# Patient Record
Sex: MALE | Race: WHITE | NOT HISPANIC OR LATINO | Employment: UNEMPLOYED | URBAN - METROPOLITAN AREA
[De-identification: names, ages, dates, MRNs, and addresses within clinical notes are randomized per-mention and may not be internally consistent; named-entity substitution may affect disease eponyms.]

---

## 2017-03-13 ENCOUNTER — ANESTHESIA EVENT (OUTPATIENT)
Dept: PERIOP | Facility: HOSPITAL | Age: 62
End: 2017-03-13
Payer: COMMERCIAL

## 2017-03-14 ENCOUNTER — HOSPITAL ENCOUNTER (OUTPATIENT)
Facility: HOSPITAL | Age: 62
Setting detail: OUTPATIENT SURGERY
Discharge: HOME | End: 2017-03-14
Attending: PODIATRIST | Admitting: PODIATRIST
Payer: COMMERCIAL

## 2017-03-14 ENCOUNTER — ANESTHESIA (OUTPATIENT)
Dept: PERIOP | Facility: HOSPITAL | Age: 62
End: 2017-03-14
Payer: COMMERCIAL

## 2017-03-14 ENCOUNTER — APPOINTMENT (OUTPATIENT)
Dept: RADIOLOGY | Facility: HOSPITAL | Age: 62
End: 2017-03-14
Payer: COMMERCIAL

## 2017-03-14 VITALS
HEIGHT: 76 IN | OXYGEN SATURATION: 98 % | DIASTOLIC BLOOD PRESSURE: 81 MMHG | HEART RATE: 69 BPM | TEMPERATURE: 98.1 F | WEIGHT: 304 LBS | SYSTOLIC BLOOD PRESSURE: 123 MMHG | BODY MASS INDEX: 37.02 KG/M2 | RESPIRATION RATE: 18 BRPM

## 2017-03-14 LAB — GLUCOSE SERPL-MCNC: 118 MG/DL (ref 65–140)

## 2017-03-14 PROCEDURE — 82948 REAGENT STRIP/BLOOD GLUCOSE: CPT

## 2017-03-14 PROCEDURE — 73630 X-RAY EXAM OF FOOT: CPT

## 2017-03-14 RX ORDER — PROPOFOL 10 MG/ML
INJECTION, EMULSION INTRAVENOUS CONTINUOUS PRN
Status: DISCONTINUED | OUTPATIENT
Start: 2017-03-14 | End: 2017-03-14 | Stop reason: SURG

## 2017-03-14 RX ORDER — SODIUM CHLORIDE, SODIUM LACTATE, POTASSIUM CHLORIDE, CALCIUM CHLORIDE 600; 310; 30; 20 MG/100ML; MG/100ML; MG/100ML; MG/100ML
100 INJECTION, SOLUTION INTRAVENOUS CONTINUOUS
Status: DISCONTINUED | OUTPATIENT
Start: 2017-03-14 | End: 2017-03-14 | Stop reason: HOSPADM

## 2017-03-14 RX ORDER — ONDANSETRON 2 MG/ML
4 INJECTION INTRAMUSCULAR; INTRAVENOUS ONCE
Status: DISCONTINUED | OUTPATIENT
Start: 2017-03-14 | End: 2017-03-14 | Stop reason: HOSPADM

## 2017-03-14 RX ORDER — SODIUM CHLORIDE, SODIUM LACTATE, POTASSIUM CHLORIDE, CALCIUM CHLORIDE 600; 310; 30; 20 MG/100ML; MG/100ML; MG/100ML; MG/100ML
INJECTION, SOLUTION INTRAVENOUS CONTINUOUS PRN
Status: DISCONTINUED | OUTPATIENT
Start: 2017-03-14 | End: 2017-03-14 | Stop reason: SURG

## 2017-03-14 RX ORDER — FENTANYL CITRATE/PF 50 MCG/ML
25 SYRINGE (ML) INJECTION
Status: DISCONTINUED | OUTPATIENT
Start: 2017-03-14 | End: 2017-03-14 | Stop reason: HOSPADM

## 2017-03-14 RX ORDER — FENTANYL CITRATE 50 UG/ML
INJECTION, SOLUTION INTRAMUSCULAR; INTRAVENOUS AS NEEDED
Status: DISCONTINUED | OUTPATIENT
Start: 2017-03-14 | End: 2017-03-14 | Stop reason: SURG

## 2017-03-14 RX ORDER — MIDAZOLAM HYDROCHLORIDE 1 MG/ML
INJECTION INTRAMUSCULAR; INTRAVENOUS AS NEEDED
Status: DISCONTINUED | OUTPATIENT
Start: 2017-03-14 | End: 2017-03-14 | Stop reason: SURG

## 2017-03-14 RX ADMIN — PROPOFOL 100 MCG/KG/MIN: 10 INJECTION, EMULSION INTRAVENOUS at 10:15

## 2017-03-14 RX ADMIN — FENTANYL CITRATE 50 MCG: 50 INJECTION, SOLUTION INTRAMUSCULAR; INTRAVENOUS at 10:13

## 2017-03-14 RX ADMIN — CEFAZOLIN SODIUM 1000 MG: 1 SOLUTION INTRAVENOUS at 10:10

## 2017-03-14 RX ADMIN — SODIUM CHLORIDE, SODIUM LACTATE, POTASSIUM CHLORIDE, AND CALCIUM CHLORIDE: .6; .31; .03; .02 INJECTION, SOLUTION INTRAVENOUS at 09:42

## 2017-03-14 RX ADMIN — CEFAZOLIN SODIUM 2000 MG: 2 SOLUTION INTRAVENOUS at 10:10

## 2017-03-14 RX ADMIN — MIDAZOLAM HYDROCHLORIDE 2 MG: 1 INJECTION, SOLUTION INTRAMUSCULAR; INTRAVENOUS at 10:10

## 2018-01-10 NOTE — PROGRESS NOTES
Assessment    1  Seborrheic keratosis (702 19) (L82 1)   2  Screening for skin condition (V82 0) (Z13 89)    Plan    · Follow-up visit in 1 year Evaluation and Treatment  Follow-up  Status: Hold For -  Scheduling  Requested for: [id number]    · Glimepiride 1 MG Oral Tablet    Discussion/Summary  Discussion Summary:   Seborrheic keratosis patient advised these are normal growths we acquire with age no treatment needed no active psoriasiform process noted we'll plan follow-up again as needed continue support stockings and podiatry follow-up nothing else of concern noted on complete exam       Chief Complaint  Chief Complaint Free Text Note Form: YEARLY FULL BODY EXAM      History of Present Illness  HPI: 52-year-old male presents for follow-up secondary to his history of psoriasiform dermatitis no problems noted however continues to have issues with his leg with ulcerations and blistering occurring probably related to neuropathy followed by podiatry no specific concerns for his skin noted      Review of Systems  Complete Male Dermatology [facility] Patient:   Constitutional: Denies constitutional symptoms  Eyes: Denies eye symptoms  ENT:  denies ear symptoms, nasal symptoms, mouth or throat symptoms  Cardiovascular: Denies cardiovascular symptoms  Respiratory: Denies respiratory symptoms  Gastrointestinal: Denies gastrointestinal symptoms  Musculoskeletal: Denies musculoskeletal symptoms  Integumentary: Denies skin, hair and nail symptoms  Neurological: Denies neurologic symptoms  Psychiatric: Denies psychiatric symptoms  Endocrine: Denies endocrine symptoms  Hematologic/Lymphatic: Denies hematologic symptoms  Active Problems    1  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)   2  Psoriasis (696 1) (L40 9)   3  Screening for skin condition (V82 0) (Z13 89)   4  Ulcer of leg, chronic, right (707 10) (L97 919)    Past Medical History    1  History of diabetes mellitus (V12 29) (Z86 39)   2  History of hypercholesterolemia (V12 29) (Z86 39)   3  History of hypertension (V12 59) (Z86 79)  Past Medical History Reviewed- Derm:   The past medical history was reviewed  Surgical History  Surgical History Reviewed [facility]- Derm:   Surgical History reviewed      Family History  Mother    1  No pertinent family history  Family History Reviewed- Derm:   Family History was reviewed      Social History    · Never a smoker  Social History Reviewed [facility]- Derm: The social history was reviewed      Current Meds   1  Allopurinol 100 MG Oral Tablet; Therapy: 08XKC8189 to (Evaluate:30Apr2015) Recorded   2  Aspirin 81 MG TABS; Therapy: (Recorded:31Mar2015) to Recorded   3  Clobetasol Propionate 0 05 % External Ointment; APPLY SPARINGLY TO AFFECTED   AREA(S) TWICE DAILY; Therapy: 26TMZ8281 to (Last Rx:17Aug2015)  Requested for: 17Aug2015 Ordered   4  Glimepiride 1 MG Oral Tablet Recorded   5  MetFORMIN HCl  MG Oral Tablet Extended Release 24 Hour; Therapy: 66JZV8160 to (Evaluate:30Apr2015) Recorded   6  Simvastatin 40 MG Oral Tablet; Therapy: 34IIF7278 to (Evaluate:30Apr2015) Recorded   7  Triamcinolone Acetonide 0 1 % External Ointment; APPLY AND GENTLY MASSAGE INTO   AFFECTED AREA(S) TWICE DAILY; Therapy: 73QAY1840 to (Last QZ:66HBO1678)  Requested for: 03Jun2015 Ordered   8  Valsartan-Hydrochlorothiazide 320-12 5 MG Oral Tablet; Therapy: 13FEP2214 to (Evaluate:30Apr2015) Recorded  Medication List Reviewed: The medication list was reviewed and updated today  Allergies    1  No Known Drug Allergies    Physical Exam    Constitutional   General appearance: Appears healthy and well developed  Lymphatic   No visible disturbance  Musculoskeletal   Digits and nails: No clubbing, cyanosis or edema  Cutaneous and nail exam normal     Skin   Scalp skin texture and hair distribution: Normal skin texture on scalp, normal hair distribution  Head: Normal turgor, no rashes, no lesions  Neck: Normal turgor, no rashes, no lesions  Chest: Normal turgor, no rashes, no lesions  Abdomen: Normal turgor, no rashes, no lesions  Back: Normal turgor, no rashes, no lesions  Right upper extremity: Normal turgor, no rashes, no lesions  Left upper extremity: Normal turgor, no rashes, no lesions  Right lower extremity: Normal turgor, no rashes, no lesions  Left lower extremity: Normal turgor, no rashes, no lesions  Neuro/Psych   Alert and oriented x 3  Displays comfort and cooperation during encounterl   Affect is normal     Finding Chronic stasis changes noted on the right leg normal keratotic papules with greasy stuck on appearance small erosion noted on the right second toe nothing else atypical noted on exam       Signatures   Electronically signed by : WESLEY Montejo ; Aug 22 2016 10:31AM EST                       (Author)

## 2019-02-18 ENCOUNTER — ANESTHESIA EVENT (OUTPATIENT)
Dept: PERIOP | Facility: HOSPITAL | Age: 64
End: 2019-02-18
Payer: COMMERCIAL

## 2019-02-19 ENCOUNTER — HOSPITAL ENCOUNTER (OUTPATIENT)
Facility: HOSPITAL | Age: 64
Setting detail: OUTPATIENT SURGERY
Discharge: HOME | End: 2019-02-19
Attending: PODIATRIST | Admitting: PODIATRIST
Payer: COMMERCIAL

## 2019-02-19 ENCOUNTER — APPOINTMENT (OUTPATIENT)
Dept: RADIOLOGY | Facility: HOSPITAL | Age: 64
End: 2019-02-19
Attending: PODIATRIST
Payer: COMMERCIAL

## 2019-02-19 ENCOUNTER — ANESTHESIA (OUTPATIENT)
Dept: PERIOP | Facility: HOSPITAL | Age: 64
End: 2019-02-19
Payer: COMMERCIAL

## 2019-02-19 VITALS
BODY MASS INDEX: 37.02 KG/M2 | WEIGHT: 304 LBS | TEMPERATURE: 98.8 F | DIASTOLIC BLOOD PRESSURE: 91 MMHG | OXYGEN SATURATION: 94 % | HEART RATE: 75 BPM | RESPIRATION RATE: 16 BRPM | HEIGHT: 76 IN | SYSTOLIC BLOOD PRESSURE: 166 MMHG

## 2019-02-19 DIAGNOSIS — M20.41 HAMMER TOE OF RIGHT FOOT: Primary | ICD-10-CM

## 2019-02-19 LAB
GLUCOSE SERPL-MCNC: 129 MG/DL (ref 65–140)
GLUCOSE SERPL-MCNC: 132 MG/DL (ref 65–140)

## 2019-02-19 PROCEDURE — 82948 REAGENT STRIP/BLOOD GLUCOSE: CPT

## 2019-02-19 PROCEDURE — 73630 X-RAY EXAM OF FOOT: CPT

## 2019-02-19 RX ORDER — MEPERIDINE HYDROCHLORIDE 25 MG/ML
12.5 INJECTION INTRAMUSCULAR; INTRAVENOUS; SUBCUTANEOUS ONCE AS NEEDED
Status: DISCONTINUED | OUTPATIENT
Start: 2019-02-19 | End: 2019-02-19 | Stop reason: HOSPADM

## 2019-02-19 RX ORDER — PROPOFOL 10 MG/ML
INJECTION, EMULSION INTRAVENOUS CONTINUOUS PRN
Status: DISCONTINUED | OUTPATIENT
Start: 2019-02-19 | End: 2019-02-19 | Stop reason: SURG

## 2019-02-19 RX ORDER — PROPOFOL 10 MG/ML
INJECTION, EMULSION INTRAVENOUS AS NEEDED
Status: DISCONTINUED | OUTPATIENT
Start: 2019-02-19 | End: 2019-02-19 | Stop reason: SURG

## 2019-02-19 RX ORDER — PROMETHAZINE HYDROCHLORIDE 25 MG/ML
25 INJECTION, SOLUTION INTRAMUSCULAR; INTRAVENOUS ONCE AS NEEDED
Status: DISCONTINUED | OUTPATIENT
Start: 2019-02-19 | End: 2019-02-19 | Stop reason: HOSPADM

## 2019-02-19 RX ORDER — LIDOCAINE HYDROCHLORIDE 10 MG/ML
INJECTION, SOLUTION INFILTRATION; PERINEURAL AS NEEDED
Status: DISCONTINUED | OUTPATIENT
Start: 2019-02-19 | End: 2019-02-19 | Stop reason: SURG

## 2019-02-19 RX ORDER — FENTANYL CITRATE/PF 50 MCG/ML
25 SYRINGE (ML) INJECTION
Status: DISCONTINUED | OUTPATIENT
Start: 2019-02-19 | End: 2019-02-19 | Stop reason: HOSPADM

## 2019-02-19 RX ORDER — BUPIVACAINE HYDROCHLORIDE 5 MG/ML
INJECTION, SOLUTION PERINEURAL AS NEEDED
Status: DISCONTINUED | OUTPATIENT
Start: 2019-02-19 | End: 2019-02-19 | Stop reason: HOSPADM

## 2019-02-19 RX ORDER — FENTANYL CITRATE 50 UG/ML
INJECTION, SOLUTION INTRAMUSCULAR; INTRAVENOUS AS NEEDED
Status: DISCONTINUED | OUTPATIENT
Start: 2019-02-19 | End: 2019-02-19 | Stop reason: SURG

## 2019-02-19 RX ORDER — MIDAZOLAM HYDROCHLORIDE 1 MG/ML
INJECTION INTRAMUSCULAR; INTRAVENOUS AS NEEDED
Status: DISCONTINUED | OUTPATIENT
Start: 2019-02-19 | End: 2019-02-19 | Stop reason: SURG

## 2019-02-19 RX ORDER — SODIUM CHLORIDE, SODIUM LACTATE, POTASSIUM CHLORIDE, CALCIUM CHLORIDE 600; 310; 30; 20 MG/100ML; MG/100ML; MG/100ML; MG/100ML
INJECTION, SOLUTION INTRAVENOUS CONTINUOUS PRN
Status: DISCONTINUED | OUTPATIENT
Start: 2019-02-19 | End: 2019-02-19 | Stop reason: SURG

## 2019-02-19 RX ORDER — DIPHENHYDRAMINE HYDROCHLORIDE 50 MG/ML
12.5 INJECTION INTRAMUSCULAR; INTRAVENOUS ONCE AS NEEDED
Status: DISCONTINUED | OUTPATIENT
Start: 2019-02-19 | End: 2019-02-19 | Stop reason: HOSPADM

## 2019-02-19 RX ORDER — HYDRALAZINE HYDROCHLORIDE 20 MG/ML
5 INJECTION INTRAMUSCULAR; INTRAVENOUS ONCE
Status: COMPLETED | OUTPATIENT
Start: 2019-02-19 | End: 2019-02-19

## 2019-02-19 RX ORDER — LIDOCAINE HYDROCHLORIDE 10 MG/ML
INJECTION, SOLUTION EPIDURAL; INFILTRATION; INTRACAUDAL; PERINEURAL AS NEEDED
Status: DISCONTINUED | OUTPATIENT
Start: 2019-02-19 | End: 2019-02-19 | Stop reason: HOSPADM

## 2019-02-19 RX ORDER — METOCLOPRAMIDE HYDROCHLORIDE 5 MG/ML
10 INJECTION INTRAMUSCULAR; INTRAVENOUS ONCE AS NEEDED
Status: DISCONTINUED | OUTPATIENT
Start: 2019-02-19 | End: 2019-02-19 | Stop reason: HOSPADM

## 2019-02-19 RX ADMIN — LIDOCAINE HYDROCHLORIDE 50 MG: 10 INJECTION, SOLUTION INFILTRATION; PERINEURAL at 11:20

## 2019-02-19 RX ADMIN — SODIUM CHLORIDE, SODIUM LACTATE, POTASSIUM CHLORIDE, AND CALCIUM CHLORIDE: .6; .31; .03; .02 INJECTION, SOLUTION INTRAVENOUS at 10:31

## 2019-02-19 RX ADMIN — FENTANYL CITRATE 50 MCG: 50 INJECTION, SOLUTION INTRAMUSCULAR; INTRAVENOUS at 11:20

## 2019-02-19 RX ADMIN — PROPOFOL 100 MCG/KG/MIN: 10 INJECTION, EMULSION INTRAVENOUS at 11:20

## 2019-02-19 RX ADMIN — MIDAZOLAM 2 MG: 1 INJECTION INTRAMUSCULAR; INTRAVENOUS at 11:14

## 2019-02-19 RX ADMIN — PROPOFOL 30 MG: 10 INJECTION, EMULSION INTRAVENOUS at 11:20

## 2019-02-19 RX ADMIN — HYDRALAZINE HYDROCHLORIDE 5 MG: 20 INJECTION INTRAMUSCULAR; INTRAVENOUS at 12:56

## 2019-02-19 NOTE — ANESTHESIA POSTPROCEDURE EVALUATION
Post-Op Assessment Note    CV Status:  Stable    Pain management: adequate     Mental Status:  Alert and awake   Hydration Status:  Euvolemic   PONV Controlled:  Controlled   Airway Patency:  Patent   Post Op Vitals Reviewed: Yes      Staff: CRNA           /80 (02/19/19 1151)    Temp 97 9 °F (36 6 °C) (02/19/19 1151)    Pulse 73 (02/19/19 1151)   Resp 20 (02/19/19 1151)    SpO2 97 % (02/19/19 1151)

## 2019-02-19 NOTE — OP NOTE
OPERATIVE REPORT  PATIENT NAME: Tonda Mcburney    :  1955  MRN: [id number]  Pt Location: BE OR ROOM 05    SURGERY DATE: 2019    Surgeon(s) and Role:     Lauryn Conway DPM - Primary    Preop Diagnosis:  Other hammer toe(s) (acquired), right foot [M20 41]    Post-Op Diagnosis Codes:     * Other hammer toe(s) (acquired), right foot [M20 41]    Procedure(s) (LRB):  THIRD HAMMER TOE CORRECTION (Right)    Specimen(s):  * No specimens in log *    Estimated Blood Loss:   Minimal    Drains:  * No LDAs found *    Anesthesia Type:   IV Sedation with Anesthesia    Operative Indications: Other hammer toe(s) (acquired), right foot [M20 41]      Operative Findings:  Hammertoe third digit right foot    Complications:   None    Procedure and Technique:  The patient was brought into the OR placed on a well-padded OR table in supine fashion  Following prep and drape of the right lower extremity in the usual sterile manner  the procedure began  Procedure one:  PIPJ arthroplasty third digit right foot    Attention was directed at the dorsum of the third digit right foot where an incision measuring approximately 3 centimeters in length was made  The incision was deepened to the level extensor tendon taking care to retract neurovascular bundles a bovieing small less structure necessary  A linear incision was made at the medial lateral aspects of the incision such that the collateral ligaments of the joint will be transected  A Rindge elevator was then used to reflect the extensor tendon laterally and the head of the proximal phalanx was delivered into the surgical site  A bone cutting forceps was then used at the neck of the proximal phalanx to transect and remove the head of the proximal phalanx  All structures were then returned to anatomic position  The skin was closed with four 0 nylon in simple ruptured fashion  A dressing of bacitracin, 4x4s, fluff, Julisa and then Coban tape was used      The patient tolerated procedure and anesthesia well  The patient was taken from the OR under the care of Anesthesia vital signs stable and vascular status at preoperative levels     I was present for the entire procedure    Patient Disposition:  PACU     SIGNATURE: Helio Martinez DPM  DATE: February 19, 2019  TIME: 11:51 AM

## 2019-02-19 NOTE — ANESTHESIA PREPROCEDURE EVALUATION
Review of Systems/Medical History  Patient summary reviewed        Cardiovascular  Hyperlipidemia, Hypertension controlled,    Pulmonary  Negative pulmonary ROS        GI/Hepatic  Negative GI/hepatic ROS          Negative  ROS        Endo/Other  Diabetes well controlled type 2 Oral agent,      GYN  Negative gynecology ROS          Hematology  Negative hematology ROS      Musculoskeletal  Negative musculoskeletal ROS        Neurology  Negative neurology ROS      Psychology   Negative psychology ROS              Physical Exam    Airway    Mallampati score: II  TM Distance: >3 FB  Neck ROM: full     Dental   No notable dental hx     Cardiovascular  Rhythm: regular, Rate: normal, Cardiovascular exam normal    Pulmonary  Pulmonary exam normal Breath sounds clear to auscultation,     Other Findings        Anesthesia Plan  ASA Score- 2     Anesthesia Type- IV sedation with anesthesia with ASA Monitors  Additional Monitors:   Airway Plan:         Plan Factors-    Induction- intravenous  Postoperative Plan- Plan for postoperative opioid use  Informed Consent- Anesthetic plan and risks discussed with patient  I personally reviewed this patient with the CRNA  Discussed and agreed on the Anesthesia Plan with the CRNA  Wendie Chavarria

## 2019-02-19 NOTE — DISCHARGE INSTRUCTIONS
Do not remove dressing  Do not get dressing wet  Elevate right foot on pillows above heart level  Ice right foot at 15 min  Intervals for the first 24 hours  Do not use heat  Weight bearing as tolerated with surgical shoe on while ambulating  Call doctor for increased pain, bleeding, fever above 101 4 orally, chills, or foul odor from your incision site

## 2019-02-19 NOTE — H&P
history and Physical reviewed   No changes   Blood sugar 118  OK to proceed with hammar toe procedure

## 2019-04-17 ENCOUNTER — OFFICE VISIT (OUTPATIENT)
Dept: DERMATOLOGY | Facility: CLINIC | Age: 64
End: 2019-04-17
Payer: COMMERCIAL

## 2019-04-17 DIAGNOSIS — Z13.89 SCREENING FOR SKIN CONDITION: ICD-10-CM

## 2019-04-17 DIAGNOSIS — I87.2 STASIS DERMATITIS OF BOTH LEGS: Primary | ICD-10-CM

## 2019-04-17 DIAGNOSIS — D22.9 NEVUS: ICD-10-CM

## 2019-04-17 PROCEDURE — 99214 OFFICE O/P EST MOD 30 MIN: CPT | Performed by: DERMATOLOGY

## 2019-04-17 RX ORDER — AMOXICILLIN AND CLAVULANATE POTASSIUM 500; 125 MG/1; MG/1
1 TABLET, FILM COATED ORAL 2 TIMES DAILY
Refills: 0 | COMMUNITY
Start: 2019-02-11 | End: 2020-11-20

## 2019-04-17 RX ORDER — CLINDAMYCIN HYDROCHLORIDE 150 MG/1
150 CAPSULE ORAL 3 TIMES DAILY
Refills: 0 | COMMUNITY
Start: 2019-04-08 | End: 2020-11-20

## 2019-05-15 ENCOUNTER — OFFICE VISIT (OUTPATIENT)
Dept: DERMATOLOGY | Facility: CLINIC | Age: 64
End: 2019-05-15
Payer: COMMERCIAL

## 2019-05-15 DIAGNOSIS — I87.2 STASIS DERMATITIS OF BOTH LEGS: Primary | ICD-10-CM

## 2019-05-15 PROCEDURE — 99213 OFFICE O/P EST LOW 20 MIN: CPT | Performed by: DERMATOLOGY

## 2019-06-17 ENCOUNTER — OFFICE VISIT (OUTPATIENT)
Dept: DERMATOLOGY | Facility: CLINIC | Age: 64
End: 2019-06-17
Payer: COMMERCIAL

## 2019-06-17 DIAGNOSIS — I87.2 STASIS DERMATITIS OF BOTH LEGS: Primary | ICD-10-CM

## 2019-06-17 PROCEDURE — 99213 OFFICE O/P EST LOW 20 MIN: CPT | Performed by: DERMATOLOGY

## 2019-07-24 DIAGNOSIS — I87.2 STASIS DERMATITIS OF BOTH LEGS: ICD-10-CM

## 2019-10-28 ENCOUNTER — OFFICE VISIT (OUTPATIENT)
Dept: DERMATOLOGY | Facility: CLINIC | Age: 64
End: 2019-10-28
Payer: COMMERCIAL

## 2019-10-28 DIAGNOSIS — I87.2 STASIS DERMATITIS OF BOTH LEGS: Primary | ICD-10-CM

## 2019-10-28 DIAGNOSIS — I83.029 STASIS LEG ULCER, LEFT (HCC): ICD-10-CM

## 2019-10-28 DIAGNOSIS — L97.929 STASIS LEG ULCER, LEFT (HCC): ICD-10-CM

## 2019-10-28 PROCEDURE — 87205 SMEAR GRAM STAIN: CPT | Performed by: DERMATOLOGY

## 2019-10-28 PROCEDURE — 87186 SC STD MICRODIL/AGAR DIL: CPT | Performed by: DERMATOLOGY

## 2019-10-28 PROCEDURE — 99213 OFFICE O/P EST LOW 20 MIN: CPT | Performed by: DERMATOLOGY

## 2019-10-28 PROCEDURE — 87070 CULTURE OTHR SPECIMN AEROBIC: CPT | Performed by: DERMATOLOGY

## 2019-10-28 PROCEDURE — 29580 STRAPPING UNNA BOOT: CPT | Performed by: DERMATOLOGY

## 2019-10-31 DIAGNOSIS — L73.9 FOLLICULITIS: ICD-10-CM

## 2019-10-31 DIAGNOSIS — Z22.322 MRSA (METHICILLIN RESISTANT STAPH AUREUS) CULTURE POSITIVE: ICD-10-CM

## 2019-10-31 DIAGNOSIS — I83.029 STASIS LEG ULCER, LEFT (HCC): Primary | ICD-10-CM

## 2019-10-31 DIAGNOSIS — L97.929 STASIS LEG ULCER, LEFT (HCC): Primary | ICD-10-CM

## 2019-10-31 LAB
BACTERIA WND AEROBE CULT: ABNORMAL
GRAM STN SPEC: ABNORMAL

## 2019-10-31 RX ORDER — DOXYCYCLINE HYCLATE 100 MG/1
100 CAPSULE ORAL EVERY 12 HOURS SCHEDULED
Qty: 20 CAPSULE | Refills: 0 | Status: SHIPPED | OUTPATIENT
Start: 2019-10-31 | End: 2019-11-10

## 2019-11-04 ENCOUNTER — OFFICE VISIT (OUTPATIENT)
Dept: DERMATOLOGY | Facility: CLINIC | Age: 64
End: 2019-11-04
Payer: COMMERCIAL

## 2019-11-04 DIAGNOSIS — I87.2 STASIS DERMATITIS OF BOTH LEGS: ICD-10-CM

## 2019-11-04 DIAGNOSIS — Z22.322 MRSA (METHICILLIN RESISTANT STAPH AUREUS) CULTURE POSITIVE: ICD-10-CM

## 2019-11-04 DIAGNOSIS — I83.029 STASIS LEG ULCER, LEFT (HCC): Primary | ICD-10-CM

## 2019-11-04 DIAGNOSIS — L97.929 STASIS LEG ULCER, LEFT (HCC): Primary | ICD-10-CM

## 2019-11-04 PROCEDURE — 29580 STRAPPING UNNA BOOT: CPT | Performed by: DERMATOLOGY

## 2019-11-04 NOTE — PATIENT INSTRUCTIONS
Stasis ulcer was secondary MRSA improved again placed patient on no other Unna boot Coban dressing and advised patient to bring support stockings in the next visit so we can just basically switch him over to the support stockings if all looks well

## 2019-11-04 NOTE — PROGRESS NOTES
[address]  [facility]  [address] [address]  [phone]  [phone]     MRN: [id number] : 1955  Encounter: [id number]  Patient Information: Tara Cornell    Subjective:     52-year-old male seen in follow-up secondary to stasis dermatitis and with has superficial erosions with culture showing MRSA  Patient was started on doxycycline     Objective: There were no vitals taken for this visit  Physical Exam:    General Appearance:    Alert, cooperative, no distress   Skin:   Previous site of inflammation leg appears improved still red and scaly some small erosion still present but no signs of any active infection of pustules at this time     Assessment:     1  Stasis leg ulcer, left (HCC)     2  Stasis dermatitis of both legs     3  MRSA (methicillin resistant staph aureus) culture positive           Plan:   Stasis ulcer was secondary MRSA improved again placed patient on no other Unna boot Coban dressing and advised patient to bring support stockings in the next visit so we can just basically switch him over to the support stockings if all looks well      Prior to Admission medications    Medication Sig Start Date End Date Taking?  Authorizing Provider   allopurinol (ZYLOPRIM) 100 mg tablet Take 100 mg by mouth daily   Yes Historical Provider, MD   amoxicillin-clavulanate (AUGMENTIN) 500-125 mg per tablet Take 1 tablet by mouth 2 (two) times a day 19  Yes Historical Provider, MD   aspirin 81 MG tablet Take 81 mg by mouth every other day   Yes Historical Provider, MD   clindamycin (CLEOCIN) 150 mg capsule Take 150 mg by mouth 3 (three) times a day 19  Yes Historical Provider, MD   doxycycline hyclate (VIBRAMYCIN) 100 mg capsule Take 1 capsule (100 mg total) by mouth every 12 (twelve) hours for 20 doses 10/31/19 11/10/19 Yes Jessica Hernandez MD   metFORMIN (GLUCOPHAGE) 500 mg tablet Take 1,000 mg by mouth 2 (two) times a day with meals Yes Historical Provider, MD   simvastatin (ZOCOR) 40 mg tablet Take 40 mg by mouth daily at bedtime   Yes Historical Provider, MD   triamcinolone (KENALOG) 0 1 % ointment Apply topically 2 (two) times a day 2 left leg and other rashes till resolved 7/24/19  Yes Francoise Montes De Oca MD   valsartan-hydrochlorothiazide (DIOVAN-HCT) 320-12 5 MG per tablet Take 1 tablet by mouth daily   Yes Historical Provider, MD   Multiple Vitamins-Minerals (MULTIVITAMIN WITH MINERALS) tablet Take 1 tablet by mouth daily    Historical Provider, MD     No Known Allergies    Francoise Montes De Oca MD  11/4/2019,4:01 PM    Portions of the record may have been created with voice recognition software   Occasional wrong word or "sound a like" substitutions may have occurred due to the inherent limitations of voice recognition software   Read the chart carefully and recognize, using context, where substitutions have occurred

## 2019-11-12 ENCOUNTER — CLINICAL SUPPORT (OUTPATIENT)
Dept: DERMATOLOGY | Facility: CLINIC | Age: 64
End: 2019-11-12
Payer: COMMERCIAL

## 2019-11-12 DIAGNOSIS — I83.029 STASIS LEG ULCER, LEFT (HCC): Primary | ICD-10-CM

## 2019-11-12 DIAGNOSIS — L97.929 STASIS LEG ULCER, LEFT (HCC): Primary | ICD-10-CM

## 2019-11-12 DIAGNOSIS — I87.2 STASIS DERMATITIS OF BOTH LEGS: ICD-10-CM

## 2019-11-12 PROCEDURE — 99212 OFFICE O/P EST SF 10 MIN: CPT | Performed by: DERMATOLOGY

## 2019-11-12 RX ORDER — LOSARTAN POTASSIUM AND HYDROCHLOROTHIAZIDE 12.5; 1 MG/1; MG/1
TABLET ORAL DAILY
Refills: 0 | COMMUNITY
Start: 2019-09-13 | End: 2021-07-12 | Stop reason: HOSPADM

## 2019-11-12 RX ORDER — METFORMIN HYDROCHLORIDE 500 MG/1
TABLET, EXTENDED RELEASE ORAL
Refills: 0 | COMMUNITY
Start: 2019-08-22 | End: 2020-11-20

## 2019-11-12 RX ORDER — GLIMEPIRIDE 2 MG/1
TABLET ORAL
Refills: 0 | COMMUNITY
Start: 2019-10-28 | End: 2020-11-20

## 2019-11-12 NOTE — PROGRESS NOTES
[address] [facility] DERMATOLOGY  [facility] [address]  [phone]  [phone]     MRN: [id number] : 1955  Encounter: [id number]  Patient Information: Nia Trammell    Subjective:     52-year-old male presents for follow-up secondary to his stasis dermatitis and recent treatment with  Unna boots     Objective: There were no vitals taken for this visit  Physical Exam:    General Appearance:    Alert, cooperative, no distress   Skin: Dermatitis completely resolved     Assessment:     1  Stasis leg ulcer, left (HCC)     2  Stasis dermatitis of both legs           Plan:   Process improved patient to wear support stockings daily and plan follow-up again as needed      Prior to Admission medications    Medication Sig Start Date End Date Taking?  Authorizing Provider   allopurinol (ZYLOPRIM) 100 mg tablet Take 100 mg by mouth daily   Yes Historical Provider, MD   amoxicillin-clavulanate (AUGMENTIN) 500-125 mg per tablet Take 1 tablet by mouth 2 (two) times a day 19  Yes Historical Provider, MD   clindamycin (CLEOCIN) 150 mg capsule Take 150 mg by mouth 3 (three) times a day 19  Yes Historical Provider, MD   glimepiride (AMARYL) 2 mg tablet  10/28/19  Yes Historical Provider, MD   losartan-hydrochlorothiazide (HYZAAR) 100-12 5 MG per tablet  19  Yes Historical Provider, MD   metFORMIN (GLUCOPHAGE) 500 mg tablet Take 1,000 mg by mouth 2 (two) times a day with meals   Yes Historical Provider, MD   metFORMIN (GLUCOPHAGE-XR) 500 mg 24 hr tablet  19  Yes Historical Provider, MD   simvastatin (ZOCOR) 40 mg tablet Take 40 mg by mouth daily at bedtime   Yes Historical Provider, MD   triamcinolone (KENALOG) 0 1 % ointment Apply topically 2 (two) times a day 2 left leg and other rashes till resolved 19  Yes Manuel Kim MD   aspirin 81 MG tablet Take 81 mg by mouth every other day    Historical Provider, MD   Multiple Vitamins-Minerals (MULTIVITAMIN WITH MINERALS) tablet Take 1 tablet by mouth daily    Historical Provider, MD   valsartan-hydrochlorothiazide (DIOVAN-HCT) 320-12 5 MG per tablet Take 1 tablet by mouth daily    Historical Provider, MD     No Known Allergies    Francoise Montes De Oca MD  11/12/2019,3:42 PM    Portions of the record may have been created with voice recognition software   Occasional wrong word or "sound a like" substitutions may have occurred due to the inherent limitations of voice recognition software   Read the chart carefully and recognize, using context, where substitutions have occurred

## 2020-05-14 DIAGNOSIS — I87.2 STASIS DERMATITIS OF BOTH LEGS: ICD-10-CM

## 2020-11-20 RX ORDER — MULTIVITAMIN
1 TABLET ORAL DAILY
COMMUNITY

## 2020-11-23 ENCOUNTER — ANESTHESIA EVENT (OUTPATIENT)
Dept: PERIOP | Facility: HOSPITAL | Age: 65
End: 2020-11-23
Payer: COMMERCIAL

## 2020-11-24 ENCOUNTER — APPOINTMENT (OUTPATIENT)
Dept: RADIOLOGY | Facility: HOSPITAL | Age: 65
End: 2020-11-24
Payer: COMMERCIAL

## 2020-11-24 ENCOUNTER — HOSPITAL ENCOUNTER (OUTPATIENT)
Facility: HOSPITAL | Age: 65
Setting detail: OUTPATIENT SURGERY
Discharge: HOME | End: 2020-11-24
Attending: PODIATRIST | Admitting: PODIATRIST
Payer: COMMERCIAL

## 2020-11-24 ENCOUNTER — ANESTHESIA (OUTPATIENT)
Dept: PERIOP | Facility: HOSPITAL | Age: 65
End: 2020-11-24
Payer: COMMERCIAL

## 2020-11-24 VITALS
TEMPERATURE: 97.5 F | DIASTOLIC BLOOD PRESSURE: 74 MMHG | WEIGHT: 310 LBS | SYSTOLIC BLOOD PRESSURE: 137 MMHG | HEART RATE: 68 BPM | HEIGHT: 76 IN | OXYGEN SATURATION: 98 % | BODY MASS INDEX: 37.75 KG/M2 | RESPIRATION RATE: 18 BRPM

## 2020-11-24 VITALS — HEART RATE: 76 BPM

## 2020-11-24 DIAGNOSIS — M20.42 HAMMERTOE OF LEFT FOOT: Primary | ICD-10-CM

## 2020-11-24 LAB
GLUCOSE SERPL-MCNC: 112 MG/DL (ref 65–140)
GLUCOSE SERPL-MCNC: 120 MG/DL (ref 65–140)

## 2020-11-24 PROCEDURE — C1713 ANCHOR/SCREW BN/BN,TIS/BN: HCPCS | Performed by: PODIATRIST

## 2020-11-24 PROCEDURE — 73620 X-RAY EXAM OF FOOT: CPT

## 2020-11-24 PROCEDURE — 82948 REAGENT STRIP/BLOOD GLUCOSE: CPT

## 2020-11-24 PROCEDURE — 73630 X-RAY EXAM OF FOOT: CPT

## 2020-11-24 DEVICE — C-WIRE PAK DOUBLE ENDED ORTHOPAEDIC WIRE, SPADE, .062" (1.57 MM)
Type: IMPLANTABLE DEVICE | Site: FOOT | Status: FUNCTIONAL
Brand: C-WIRE

## 2020-11-24 RX ORDER — ONDANSETRON 2 MG/ML
4 INJECTION INTRAMUSCULAR; INTRAVENOUS ONCE AS NEEDED
Status: DISCONTINUED | OUTPATIENT
Start: 2020-11-24 | End: 2020-11-24 | Stop reason: HOSPADM

## 2020-11-24 RX ORDER — SODIUM CHLORIDE, SODIUM LACTATE, POTASSIUM CHLORIDE, CALCIUM CHLORIDE 600; 310; 30; 20 MG/100ML; MG/100ML; MG/100ML; MG/100ML
INJECTION, SOLUTION INTRAVENOUS CONTINUOUS PRN
Status: DISCONTINUED | OUTPATIENT
Start: 2020-11-24 | End: 2020-11-24

## 2020-11-24 RX ORDER — CEFAZOLIN SODIUM 1 G/3ML
INJECTION, POWDER, FOR SOLUTION INTRAMUSCULAR; INTRAVENOUS AS NEEDED
Status: DISCONTINUED | OUTPATIENT
Start: 2020-11-24 | End: 2020-11-24

## 2020-11-24 RX ORDER — LIDOCAINE HYDROCHLORIDE 10 MG/ML
0.5 INJECTION, SOLUTION EPIDURAL; INFILTRATION; INTRACAUDAL; PERINEURAL ONCE AS NEEDED
Status: COMPLETED | OUTPATIENT
Start: 2020-11-24 | End: 2020-11-24

## 2020-11-24 RX ORDER — ONDANSETRON 2 MG/ML
INJECTION INTRAMUSCULAR; INTRAVENOUS AS NEEDED
Status: DISCONTINUED | OUTPATIENT
Start: 2020-11-24 | End: 2020-11-24

## 2020-11-24 RX ORDER — GINSENG 100 MG
CAPSULE ORAL AS NEEDED
Status: DISCONTINUED | OUTPATIENT
Start: 2020-11-24 | End: 2020-11-24 | Stop reason: HOSPADM

## 2020-11-24 RX ORDER — FENTANYL CITRATE 50 UG/ML
INJECTION, SOLUTION INTRAMUSCULAR; INTRAVENOUS AS NEEDED
Status: DISCONTINUED | OUTPATIENT
Start: 2020-11-24 | End: 2020-11-24

## 2020-11-24 RX ORDER — FENTANYL CITRATE/PF 50 MCG/ML
50 SYRINGE (ML) INJECTION
Status: DISCONTINUED | OUTPATIENT
Start: 2020-11-24 | End: 2020-11-24 | Stop reason: HOSPADM

## 2020-11-24 RX ORDER — SODIUM CHLORIDE, SODIUM LACTATE, POTASSIUM CHLORIDE, CALCIUM CHLORIDE 600; 310; 30; 20 MG/100ML; MG/100ML; MG/100ML; MG/100ML
125 INJECTION, SOLUTION INTRAVENOUS CONTINUOUS
Status: DISCONTINUED | OUTPATIENT
Start: 2020-11-24 | End: 2020-11-24 | Stop reason: HOSPADM

## 2020-11-24 RX ORDER — ASPIRIN 81 MG/1
81 TABLET, CHEWABLE ORAL DAILY
COMMUNITY

## 2020-11-24 RX ORDER — KETAMINE HYDROCHLORIDE 50 MG/ML
INJECTION, SOLUTION, CONCENTRATE INTRAMUSCULAR; INTRAVENOUS AS NEEDED
Status: DISCONTINUED | OUTPATIENT
Start: 2020-11-24 | End: 2020-11-24

## 2020-11-24 RX ORDER — BUPIVACAINE HYDROCHLORIDE AND EPINEPHRINE 5; 5 MG/ML; UG/ML
INJECTION, SOLUTION PERINEURAL AS NEEDED
Status: DISCONTINUED | OUTPATIENT
Start: 2020-11-24 | End: 2020-11-24 | Stop reason: HOSPADM

## 2020-11-24 RX ORDER — PROPOFOL 10 MG/ML
INJECTION, EMULSION INTRAVENOUS CONTINUOUS PRN
Status: DISCONTINUED | OUTPATIENT
Start: 2020-11-24 | End: 2020-11-24

## 2020-11-24 RX ORDER — LIDOCAINE HYDROCHLORIDE 10 MG/ML
INJECTION, SOLUTION EPIDURAL; INFILTRATION; INTRACAUDAL; PERINEURAL AS NEEDED
Status: DISCONTINUED | OUTPATIENT
Start: 2020-11-24 | End: 2020-11-24 | Stop reason: HOSPADM

## 2020-11-24 RX ORDER — SODIUM CHLORIDE, SODIUM LACTATE, POTASSIUM CHLORIDE, CALCIUM CHLORIDE 600; 310; 30; 20 MG/100ML; MG/100ML; MG/100ML; MG/100ML
100 INJECTION, SOLUTION INTRAVENOUS CONTINUOUS
Status: DISCONTINUED | OUTPATIENT
Start: 2020-11-24 | End: 2020-11-24 | Stop reason: HOSPADM

## 2020-11-24 RX ORDER — MIDAZOLAM HYDROCHLORIDE 2 MG/2ML
INJECTION, SOLUTION INTRAMUSCULAR; INTRAVENOUS AS NEEDED
Status: DISCONTINUED | OUTPATIENT
Start: 2020-11-24 | End: 2020-11-24

## 2020-11-24 RX ORDER — HYDROMORPHONE HCL/PF 1 MG/ML
0.2 SYRINGE (ML) INJECTION
Status: DISCONTINUED | OUTPATIENT
Start: 2020-11-24 | End: 2020-11-24 | Stop reason: HOSPADM

## 2020-11-24 RX ORDER — PROPOFOL 10 MG/ML
INJECTION, EMULSION INTRAVENOUS AS NEEDED
Status: DISCONTINUED | OUTPATIENT
Start: 2020-11-24 | End: 2020-11-24

## 2020-11-24 RX ADMIN — PROPOFOL 40 MCG/KG/MIN: 10 INJECTION, EMULSION INTRAVENOUS at 13:28

## 2020-11-24 RX ADMIN — SODIUM CHLORIDE, SODIUM LACTATE, POTASSIUM CHLORIDE, AND CALCIUM CHLORIDE 125 ML/HR: .6; .31; .03; .02 INJECTION, SOLUTION INTRAVENOUS at 12:34

## 2020-11-24 RX ADMIN — FENTANYL CITRATE 25 MCG: 50 INJECTION INTRAMUSCULAR; INTRAVENOUS at 13:43

## 2020-11-24 RX ADMIN — LIDOCAINE HYDROCHLORIDE 0.5 ML: 10 INJECTION, SOLUTION EPIDURAL; INFILTRATION; INTRACAUDAL; PERINEURAL at 12:34

## 2020-11-24 RX ADMIN — PROPOFOL 60 MG: 10 INJECTION, EMULSION INTRAVENOUS at 13:28

## 2020-11-24 RX ADMIN — CEFAZOLIN 3000 MG: 1 INJECTION, POWDER, FOR SOLUTION INTRAVENOUS at 13:26

## 2020-11-24 RX ADMIN — MIDAZOLAM 1 MG: 1 INJECTION INTRAMUSCULAR; INTRAVENOUS at 13:25

## 2020-11-24 RX ADMIN — FENTANYL CITRATE 25 MCG: 50 INJECTION INTRAMUSCULAR; INTRAVENOUS at 14:31

## 2020-11-24 RX ADMIN — KETAMINE HYDROCHLORIDE 30 MG: 50 INJECTION, SOLUTION INTRAMUSCULAR; INTRAVENOUS at 13:28

## 2020-11-24 RX ADMIN — Medication 50 MCG: at 16:25

## 2020-11-24 RX ADMIN — KETAMINE HYDROCHLORIDE 10 MG: 50 INJECTION, SOLUTION INTRAMUSCULAR; INTRAVENOUS at 13:45

## 2020-11-24 RX ADMIN — SODIUM CHLORIDE, SODIUM LACTATE, POTASSIUM CHLORIDE, AND CALCIUM CHLORIDE: .6; .31; .03; .02 INJECTION, SOLUTION INTRAVENOUS at 13:21

## 2020-11-24 RX ADMIN — KETAMINE HYDROCHLORIDE 10 MG: 50 INJECTION, SOLUTION INTRAMUSCULAR; INTRAVENOUS at 14:26

## 2020-11-24 RX ADMIN — ONDANSETRON 4 MG: 2 INJECTION INTRAMUSCULAR; INTRAVENOUS at 14:01

## 2020-11-24 RX ADMIN — FENTANYL CITRATE 25 MCG: 50 INJECTION INTRAMUSCULAR; INTRAVENOUS at 14:52

## 2020-11-24 RX ADMIN — PROPOFOL 40 MG: 10 INJECTION, EMULSION INTRAVENOUS at 13:35

## 2020-11-24 RX ADMIN — MIDAZOLAM 1 MG: 1 INJECTION INTRAMUSCULAR; INTRAVENOUS at 13:21

## 2020-11-24 RX ADMIN — Medication 50 MCG: at 16:12

## 2020-11-24 RX ADMIN — SODIUM CHLORIDE, SODIUM LACTATE, POTASSIUM CHLORIDE, AND CALCIUM CHLORIDE 125 ML/HR: .6; .31; .03; .02 INJECTION, SOLUTION INTRAVENOUS at 16:33

## 2020-11-24 RX ADMIN — FENTANYL CITRATE 25 MCG: 50 INJECTION INTRAMUSCULAR; INTRAVENOUS at 13:58

## 2021-03-10 DIAGNOSIS — Z23 ENCOUNTER FOR IMMUNIZATION: ICD-10-CM

## 2021-05-21 ENCOUNTER — TRANSCRIBE ORDERS (OUTPATIENT)
Dept: ADMINISTRATIVE | Facility: HOSPITAL | Age: 66
End: 2021-05-21

## 2021-05-21 ENCOUNTER — APPOINTMENT (OUTPATIENT)
Dept: LAB | Facility: HOSPITAL | Age: 66
End: 2021-05-21
Payer: COMMERCIAL

## 2021-05-21 DIAGNOSIS — E78.5 HYPERLIPIDEMIA, UNSPECIFIED HYPERLIPIDEMIA TYPE: ICD-10-CM

## 2021-05-21 DIAGNOSIS — E11.37X1 CONTROLLED TYPE 2 DIABETES MELLITUS WITH DIABETIC MACULAR EDEMA OF RIGHT EYE RESOLVED AFTER TREATMENT, UNSPECIFIED WHETHER LONG TERM INSULIN USE (HCC): ICD-10-CM

## 2021-05-21 DIAGNOSIS — M10.9 GOUT, UNSPECIFIED CAUSE, UNSPECIFIED CHRONICITY, UNSPECIFIED SITE: Primary | ICD-10-CM

## 2021-05-21 DIAGNOSIS — M10.9 GOUT, UNSPECIFIED CAUSE, UNSPECIFIED CHRONICITY, UNSPECIFIED SITE: ICD-10-CM

## 2021-05-21 LAB
ALBUMIN SERPL BCP-MCNC: 3.6 G/DL (ref 3.5–5)
ALP SERPL-CCNC: 65 U/L (ref 46–116)
ALT SERPL W P-5'-P-CCNC: 48 U/L (ref 12–78)
ANION GAP SERPL CALCULATED.3IONS-SCNC: 10 MMOL/L (ref 4–13)
AST SERPL W P-5'-P-CCNC: 29 U/L (ref 5–45)
BACTERIA UR QL AUTO: ABNORMAL /HPF
BASOPHILS # BLD AUTO: 0.05 THOUSANDS/ΜL (ref 0–0.1)
BASOPHILS NFR BLD AUTO: 1 % (ref 0–1)
BILIRUB SERPL-MCNC: 0.3 MG/DL (ref 0.2–1)
BILIRUB UR QL STRIP: NEGATIVE
BUN SERPL-MCNC: 20 MG/DL (ref 5–25)
CALCIUM SERPL-MCNC: 8.7 MG/DL (ref 8.3–10.1)
CHLORIDE SERPL-SCNC: 101 MMOL/L (ref 100–108)
CHOLEST SERPL-MCNC: 141 MG/DL (ref 50–200)
CLARITY UR: CLEAR
CO2 SERPL-SCNC: 30 MMOL/L (ref 21–32)
COLOR UR: YELLOW
CREAT SERPL-MCNC: 1.05 MG/DL (ref 0.6–1.3)
CREAT UR-MCNC: 112 MG/DL
EOSINOPHIL # BLD AUTO: 0.43 THOUSAND/ΜL (ref 0–0.61)
EOSINOPHIL NFR BLD AUTO: 7 % (ref 0–6)
ERYTHROCYTE [DISTWIDTH] IN BLOOD BY AUTOMATED COUNT: 14.1 % (ref 11.6–15.1)
EST. AVERAGE GLUCOSE BLD GHB EST-MCNC: 148 MG/DL
GFR SERPL CREATININE-BSD FRML MDRD: 75 ML/MIN/1.73SQ M
GLUCOSE P FAST SERPL-MCNC: 137 MG/DL (ref 65–99)
GLUCOSE UR STRIP-MCNC: NEGATIVE MG/DL
HBA1C MFR BLD: 6.8 %
HCT VFR BLD AUTO: 41.5 % (ref 36.5–49.3)
HDLC SERPL-MCNC: 29 MG/DL
HGB BLD-MCNC: 13.2 G/DL (ref 12–17)
HGB UR QL STRIP.AUTO: NEGATIVE
HYALINE CASTS #/AREA URNS LPF: ABNORMAL /LPF
IMM GRANULOCYTES # BLD AUTO: 0.03 THOUSAND/UL (ref 0–0.2)
IMM GRANULOCYTES NFR BLD AUTO: 1 % (ref 0–2)
KETONES UR STRIP-MCNC: NEGATIVE MG/DL
LDLC SERPL CALC-MCNC: 60 MG/DL (ref 0–100)
LEUKOCYTE ESTERASE UR QL STRIP: NEGATIVE
LYMPHOCYTES # BLD AUTO: 0.74 THOUSANDS/ΜL (ref 0.6–4.47)
LYMPHOCYTES NFR BLD AUTO: 12 % (ref 14–44)
MCH RBC QN AUTO: 27.2 PG (ref 26.8–34.3)
MCHC RBC AUTO-ENTMCNC: 31.8 G/DL (ref 31.4–37.4)
MCV RBC AUTO: 85 FL (ref 82–98)
MICROALBUMIN UR-MCNC: 2190 MG/L (ref 0–20)
MICROALBUMIN/CREAT 24H UR: 1955 MG/G CREATININE (ref 0–30)
MONOCYTES # BLD AUTO: 0.57 THOUSAND/ΜL (ref 0.17–1.22)
MONOCYTES NFR BLD AUTO: 9 % (ref 4–12)
MUCOUS THREADS UR QL AUTO: ABNORMAL
NEUTROPHILS # BLD AUTO: 4.5 THOUSANDS/ΜL (ref 1.85–7.62)
NEUTS SEG NFR BLD AUTO: 70 % (ref 43–75)
NITRITE UR QL STRIP: NEGATIVE
NON-SQ EPI CELLS URNS QL MICRO: ABNORMAL /HPF
NONHDLC SERPL-MCNC: 112 MG/DL
NRBC BLD AUTO-RTO: 0 /100 WBCS
PH UR STRIP.AUTO: 7 [PH]
PLATELET # BLD AUTO: 159 THOUSANDS/UL (ref 149–390)
PMV BLD AUTO: 10.8 FL (ref 8.9–12.7)
POTASSIUM SERPL-SCNC: 4.3 MMOL/L (ref 3.5–5.3)
PROT SERPL-MCNC: 7.4 G/DL (ref 6.4–8.2)
PROT UR STRIP-MCNC: ABNORMAL MG/DL
RBC # BLD AUTO: 4.86 MILLION/UL (ref 3.88–5.62)
RBC #/AREA URNS AUTO: ABNORMAL /HPF
SODIUM SERPL-SCNC: 141 MMOL/L (ref 136–145)
SP GR UR STRIP.AUTO: 1.02 (ref 1–1.03)
TRIGL SERPL-MCNC: 258 MG/DL
URATE SERPL-MCNC: 8 MG/DL (ref 4.2–8)
UROBILINOGEN UR QL STRIP.AUTO: 0.2 E.U./DL
WBC # BLD AUTO: 6.32 THOUSAND/UL (ref 4.31–10.16)
WBC #/AREA URNS AUTO: ABNORMAL /HPF

## 2021-05-21 PROCEDURE — 36415 COLL VENOUS BLD VENIPUNCTURE: CPT

## 2021-05-21 PROCEDURE — 81001 URINALYSIS AUTO W/SCOPE: CPT

## 2021-05-21 PROCEDURE — 82043 UR ALBUMIN QUANTITATIVE: CPT

## 2021-05-21 PROCEDURE — 80053 COMPREHEN METABOLIC PANEL: CPT

## 2021-05-21 PROCEDURE — 84550 ASSAY OF BLOOD/URIC ACID: CPT

## 2021-05-21 PROCEDURE — 80061 LIPID PANEL: CPT

## 2021-05-21 PROCEDURE — 85025 COMPLETE CBC W/AUTO DIFF WBC: CPT

## 2021-05-21 PROCEDURE — 83036 HEMOGLOBIN GLYCOSYLATED A1C: CPT

## 2021-05-21 PROCEDURE — 82570 ASSAY OF URINE CREATININE: CPT

## 2021-05-27 ENCOUNTER — TRANSCRIBE ORDERS (OUTPATIENT)
Dept: ADMINISTRATIVE | Facility: HOSPITAL | Age: 66
End: 2021-05-27

## 2021-05-27 DIAGNOSIS — I71.2 THORACIC AORTIC ANEURYSM WITHOUT RUPTURE (HCC): Primary | ICD-10-CM

## 2021-05-28 ENCOUNTER — HOSPITAL ENCOUNTER (OUTPATIENT)
Dept: CT IMAGING | Facility: CLINIC | Age: 66
Discharge: HOME | End: 2021-05-28
Payer: COMMERCIAL

## 2021-05-28 DIAGNOSIS — I71.2 THORACIC AORTIC ANEURYSM WITHOUT RUPTURE (HCC): ICD-10-CM

## 2021-05-28 PROCEDURE — 71275 CT ANGIOGRAPHY CHEST: CPT

## 2021-05-28 RX ADMIN — IOHEXOL 100 ML: 350 INJECTION, SOLUTION INTRAVENOUS at 11:14

## 2021-06-02 ENCOUNTER — TELEPHONE (OUTPATIENT)
Dept: HEMATOLOGY ONCOLOGY | Facility: CLINIC | Age: 66
End: 2021-06-02

## 2021-06-02 NOTE — TELEPHONE ENCOUNTER
New Patient Encounter    New Patient Intake Form   Patient Details:  Atiya Zee  1955  [id number]    Background Information:  [address] starts by opening a telephone encounter and gathering the following information   Who is calling to schedule? If not self, relationship to patient? Provider - Balbina Baxter   Referring Provider Dr Mari Quevedo   What is the diagnosis? retroperitoneal adenopathy with several splenic lesions   Is this diagnosis confirmed? Yes   When was the diagnosis? 5/2021   Is there a confirmed diagnosis from a biopsy/tissue reviewed by pathology? No   Were outside slides requested? NA   Is patient aware of diagnosis? Yes   Is there a personal history and what kind? No   Is there a family history and what kind? No   Reason for visit? New Diagnosis   Have you had any imaging or labs done? If so: when, where? yes  [facility]   Are records in [place]? yes   If patient has a prior history of cancer were old records obtained? NA   Was the patient told to bring a disk? No   Does the patient smoke or Vape? Did Not Speak to Patient / Office Scheduled   If yes, how many packs or cartridges per day? Scheduling Information:   Preferred [place]:  [place]     Are there any dates/time the patient cannot be seen? Miscellaneous:    After completing the above information, please route to Financial Counselor and the appropriate Nurse Navigator for review

## 2021-06-06 NOTE — PROGRESS NOTES
Hematology/Oncology Outpatient Consult  Logan Strickland 60 y o  male MRN: @ Encounter: [id number]    Date:  6/6/2021      Assessment and Plan:    1  Mesenteric lymphadenopathy  2  Retroperitoneal lymphadenopathy    41-year-old male with past medical history of diabetes, hypertension, stasis dermatitis, hypercholesterolemia, ascending thoracic aortic aneurysm presents for consultation regarding recent finding of mesenteric adenopathy, retroperitoneal adenopathy, splenic lesions  Patient had a recent CTA for surveillance of a thoracic aneurysm  Incidentally, the CTA showed bulky mesenteric adenopathy measuring up to 3 9 x 5 7 cm and retroperitoneal adenopathy measuring up to 1 8 x 2 3 cm  There were also several nodules adjacent to the spleen measuring a cm or less  These findings are concerning for possible lymphoma or metastatic disease from unknown primary  Patient denies any constitutional symptoms as below  His recent CBC, CMP were unremarkable  We will obtain the next available appt for PET-CT as well as refer to IR for biopsy of mesenteric adenopathy and/or retroperitoneal adenopathy  Patient to follow with Dr Alexandru Mattson shortly after these tests are done for management      - NM PET CT skull base to mid thigh; Future  - Ambulatory referral to Interventional Radiology; Future    3  Colon cancer screening  - patient has never had a colonoscopy before  We will refer him to GI   - Ambulatory referral to Gastroenterology; Future    Patient voiced understanding and agreement to the above  The patient knows to call the office with any questions or concerns regarding the above  I have spent 60 minutes with Patient and family today in which greater than 50% of this time was spent in counseling/coordination of care regarding Diagnostic results, Intructions for management, Patient and family education, Risk factor reductions and Impressions      HPI:   This is a 60year old male with a past medical history of DM2, HTN, stasis dermatitis, hypercholesterolemia, ascending thoracic aortic aneurysm presenting for consultation regarding recent finding of lymphadenopathy  1  Bulky Mesenteric Adenopathy, Retroperitoneal Adenopathy, Splenic Lesions  - 05/28/2021:  Patient had a CTA of chest for surveillance of thoracic aneurysm  Incidental finding showing bulky mesenteric adenopathy 3 9cm x 5 7 cm, retroperitoneal adenopathy 1 8 x 2 3cm with several splenic lesions  Concerning for lymphoma; however, metastatic disease from unknown primary must be ruled out  He denies any constitutional symptoms such as fever, chills, night sweats, lumps, bumps, sudden weight loss  Offers no other complaints today  He denies any personal history of cancer  Does not smoke or drink  He works as a  for a local [employer] last 30 years  Father had CLL; however, no other family history of cancer  He is not up-to-date on his cancer screening  He has never had a colonoscopy  Last PSA was 10/2020 which was normal     Recent Labs:  5/21/2021: WBC 6 32, Hgb 13 2g/dL, MCV 85, platelet 170, diff acceptable    ROS: Review of Systems   Constitutional: Negative for activity change, appetite change, chills, diaphoresis, fatigue, fever and unexpected weight change  HENT: Negative for mouth sores and nosebleeds  Respiratory: Negative for apnea, cough, chest tightness and shortness of breath  Cardiovascular: Negative for chest pain, palpitations and leg swelling  Gastrointestinal: Negative for abdominal distention, abdominal pain, anal bleeding, blood in stool, constipation, diarrhea, nausea and vomiting  Endocrine: Negative for cold intolerance  Genitourinary: Negative for hematuria  Musculoskeletal: Negative for arthralgias  Skin: Negative for color change, pallor, rash and wound  Neurological: Negative for dizziness, weakness, light-headedness and headaches  Hematological: Negative for adenopathy  Does not bruise/bleed easily  Past Medical History:   Diagnosis Date    Diabetes mellitus ([facility] [address] )     High cholesterol     Hypertension        Past Surgical History:   Procedure Laterality Date    BUNIONECTOMY Right 11/24/2020    Procedure: RIGHT HAV CORRECTION,;  Surgeon: Suzie Mishra DPM;  [facility];  Service: Podiatry    INCISION AND DRAINAGE OF WOUND Right 10/5/2016    Procedure: INCISION AND DRAINAGE (I&D) EXTREMITY;  Surgeon: Suzie Mishra DPM;  [facility];  Service:     PILONIDAL CYST EXCISION      [place] REPAIR OF HAMMERTOE,ONE Right 2/19/2019    Procedure: THIRD HAMMER TOE CORRECTION;  Surgeon: Suzie Mishra DPM;  [facility];  Service: Podiatry    TOE OSTEOTOMY Right 3/14/2017    Procedure: HAMMERTOE CORRECTION R 2 ;  Surgeon: Suzie Mishra DPM;  [facility];  Service:     TOE OSTEOTOMY Left 11/24/2020    Procedure: LEFT HT CORRECTION TOE;  Surgeon: Suzie Misrha DPM;  [facility];  Service: Podiatry       Social History     Socioeconomic History    Marital status: /Civil Union     Spouse name: Not on file    Number of children: Not on file    Years of education: Not on file    Highest education level: Not on file   Occupational History    Not on file   Social Needs    Financial resource strain: Not on file    Food insecurity     Worry: Not on file     Inability: Not on file   [employer] needs     Medical: Not on file     Non-medical: Not on file   Tobacco Use    Smoking status: Never Smoker    Smokeless tobacco: Never Used   Substance and Sexual Activity    Alcohol use: No    Drug use: No    Sexual activity: Yes     Partners: Female   Lifestyle    Physical activity     Days per week: Not on file     Minutes per session: Not on file    Stress: Not on file   Relationships    Social connections     Talks on phone: Not on file     Gets together: Not on file     Attends Druze service: Not on file     Active member of club or organization: Not on file Attends meetings of clubs or organizations: Not on file     Relationship status: Not on file    Intimate partner violence     Fear of current or ex partner: Not on file     Emotionally abused: Not on file     Physically abused: Not on file     Forced sexual activity: Not on file   Other Topics Concern    Not on file   Social History Narrative    Not on file       Family History   Problem Relation Age of Onset    Cancer Father        No Known Allergies      Current Outpatient Medications:     allopurinol (ZYLOPRIM) 100 mg tablet, Take 100 mg by mouth daily, Disp: , Rfl:     aspirin 81 mg chewable tablet, Chew 81 mg daily, Disp: , Rfl:     losartan-hydrochlorothiazide (HYZAAR) 100-12 5 MG per tablet, daily , Disp: , Rfl: 0    metFORMIN (GLUCOPHAGE) 500 mg tablet, Take 1,000 mg by mouth 2 (two) times a day with meals, Disp: , Rfl:     Multiple Vitamin (multivitamin) tablet, Take 1 tablet by mouth daily, Disp: , Rfl:     simvastatin (ZOCOR) 40 mg tablet, Take 40 mg by mouth daily at bedtime, Disp: , Rfl:     (Not in a hospital admission)        Physical Exam:  There were no vitals taken for this visit  Physical Exam  Constitutional:       General: He is not in acute distress  Appearance: Normal appearance  He is obese  He is not ill-appearing, toxic-appearing or diaphoretic  HENT:      Head: Normocephalic and atraumatic  Eyes:      General: No scleral icterus  Extraocular Movements: Extraocular movements intact  Conjunctiva/sclera: Conjunctivae normal    Neck:      Musculoskeletal: Normal range of motion and neck supple  Cardiovascular:      Rate and Rhythm: Normal rate and regular rhythm  Heart sounds: Murmur present  Pulmonary:      Effort: Pulmonary effort is normal  No respiratory distress  Breath sounds: Normal breath sounds  No stridor  No wheezing, rhonchi or rales  Abdominal:      General: Bowel sounds are normal    Musculoskeletal:      Right lower leg: No edema  Left lower leg: No edema  Lymphadenopathy:      Cervical: No cervical adenopathy  Skin:     General: Skin is warm and dry  Coloration: Skin is not jaundiced or pale  Findings: No erythema or rash  Neurological:      General: No focal deficit present  Mental Status: He is alert and oriented to person, place, and time  Mental status is at baseline  Psychiatric:         Mood and Affect: Mood normal          Behavior: Behavior normal          Thought Content: Thought content normal          Judgment: Judgment normal        Labs:  Lab Results   Component Value Date    WBC 6 32 05/21/2021    HGB 13 2 05/21/2021    HCT 41 5 05/21/2021    MCV 85 05/21/2021     05/21/2021     Lab Results   Component Value Date     10/06/2015    K 4 3 05/21/2021     05/21/2021    CO2 30 05/21/2021    ANIONGAP 7 10/06/2015    BUN 20 05/21/2021    CREATININE 1 05 05/21/2021    GLUCOSE 111 10/06/2015    GLUF 137 (H) 05/21/2021    CALCIUM 8 7 05/21/2021    AST 29 05/21/2021    ALT 48 05/21/2021    ALKPHOS 65 05/21/2021    PROT 6 8 10/06/2015    BILITOT 0 44 10/06/2015    EGFR 75 05/21/2021       Patient voiced understanding and agreement in the above discussion  Aware to contact our office with questions/symptoms in the interim

## 2021-06-07 ENCOUNTER — CONSULT (OUTPATIENT)
Dept: HEMATOLOGY ONCOLOGY | Facility: CLINIC | Age: 66
End: 2021-06-07
Payer: COMMERCIAL

## 2021-06-07 VITALS
RESPIRATION RATE: 18 BRPM | HEIGHT: 76 IN | SYSTOLIC BLOOD PRESSURE: 140 MMHG | WEIGHT: 304 LBS | OXYGEN SATURATION: 99 % | DIASTOLIC BLOOD PRESSURE: 92 MMHG | BODY MASS INDEX: 37.02 KG/M2 | HEART RATE: 68 BPM | TEMPERATURE: 97.6 F

## 2021-06-07 DIAGNOSIS — C48.1: ICD-10-CM

## 2021-06-07 DIAGNOSIS — R59.0 RETROPERITONEAL LYMPHADENOPATHY: ICD-10-CM

## 2021-06-07 DIAGNOSIS — R59.0 MESENTERIC LYMPHADENOPATHY: Primary | ICD-10-CM

## 2021-06-07 DIAGNOSIS — Z12.11 COLON CANCER SCREENING: ICD-10-CM

## 2021-06-07 PROCEDURE — 99204 OFFICE O/P NEW MOD 45 MIN: CPT | Performed by: INTERNAL MEDICINE

## 2021-06-16 RX ORDER — HYDROCHLOROTHIAZIDE 25 MG/1
TABLET ORAL
COMMUNITY
End: 2021-07-12 | Stop reason: HOSPADM

## 2021-06-16 RX ORDER — HYDROCODONE BITARTRATE AND ACETAMINOPHEN 5; 325 MG/1; MG/1
TABLET ORAL
COMMUNITY
End: 2021-07-12 | Stop reason: HOSPADM

## 2021-06-16 RX ORDER — METOPROLOL SUCCINATE 50 MG/1
50 TABLET, EXTENDED RELEASE ORAL EVERY EVENING
COMMUNITY
Start: 2021-05-25 | End: 2021-07-16

## 2021-06-16 RX ORDER — FLUTICASONE PROPIONATE 50 MCG
SPRAY, SUSPENSION (ML) NASAL
COMMUNITY
Start: 2021-05-20 | End: 2021-07-12 | Stop reason: HOSPADM

## 2021-06-16 RX ORDER — LOSARTAN POTASSIUM 100 MG/1
TABLET ORAL
COMMUNITY
End: 2021-07-12 | Stop reason: HOSPADM

## 2021-06-16 RX ORDER — METOPROLOL SUCCINATE AND HYDROCHLOROTHIAZIDE 12.5; 5 MG/1; MG/1
TABLET ORAL
COMMUNITY
Start: 2021-05-25 | End: 2021-07-12 | Stop reason: HOSPADM

## 2021-06-17 ENCOUNTER — OFFICE VISIT (OUTPATIENT)
Dept: GASTROENTEROLOGY | Facility: CLINIC | Age: 66
End: 2021-06-17
Payer: COMMERCIAL

## 2021-06-17 VITALS
BODY MASS INDEX: 37.14 KG/M2 | HEIGHT: 76 IN | SYSTOLIC BLOOD PRESSURE: 154 MMHG | WEIGHT: 305 LBS | DIASTOLIC BLOOD PRESSURE: 88 MMHG | HEART RATE: 75 BPM

## 2021-06-17 DIAGNOSIS — Z12.11 SCREENING FOR MALIGNANT NEOPLASM OF COLON: ICD-10-CM

## 2021-06-17 DIAGNOSIS — R59.0 LYMPHADENOPATHY, ABDOMINAL: ICD-10-CM

## 2021-06-17 DIAGNOSIS — R93.89 ABNORMAL CT OF THE CHEST: Primary | ICD-10-CM

## 2021-06-17 PROCEDURE — 99204 OFFICE O/P NEW MOD 45 MIN: CPT | Performed by: PHYSICIAN ASSISTANT

## 2021-06-17 NOTE — H&P (VIEW-ONLY)
[facility]  Violet Gonzalez 60 y o  male MRN: [id number]       CC:New patient to establish for screening colonoscopy, abnormal CT of the chest    HPI:  Gail Hua is a 18-year-old male with history of hypertension, hyperlipidemia, and type 2 diabetes  Patient is here to establish with GI, and was referred to us by Hematology-Oncology for abnormal CT of the chest and need for screening colonoscopy  Patient had CT of the chest on  528 revealing a 4 5 cm ascending  Thoracic aortic aneurysm  Bulky mesenteric adenopathy and retroperitoneal adenopathy with several splenic lesions also seen  Could represent lymphoma, but could represent metastatic disease due to unknown primary per CT scan report  Patient reports that he has had no symptoms other than back pain  He denies constipation, diarrhea, signs of overt GI bleeding, nausea, vomiting or lack of appetite  He has already seen Hematology/Oncology  His PET scan is scheduled for next Tuesday  He has never had an EGD or colonoscopy  Review of Systems:    CONSTITUTIONAL: Denies any fever, chills, or rigors  Good appetite, and no recent weight loss  HEENT: No earache or tinnitus  Denies hearing loss or visual disturbances  CARDIOVASCULAR: No chest pain or palpitations  RESPIRATORY: Denies any cough, hemoptysis, shortness of breath or dyspnea on exertion  GASTROINTESTINAL: As noted in the History of Present Illness  GENITOURINARY: No problems with urination  Denies any hematuria or dysuria  NEUROLOGIC: No dizziness or vertigo, denies headaches  MUSCULOSKELETAL: Denies any muscle or joint pain  SKIN: Denies skin rashes or itching  ENDOCRINE: Denies excessive thirst  Denies intolerance to heat or cold  PSYCHOSOCIAL: Denies depression or anxiety  Denies any recent memory loss         Current Outpatient Medications   Medication Sig Dispense Refill    allopurinol (ZYLOPRIM) 100 mg tablet Take 100 mg by mouth daily      aspirin 81 mg chewable tablet Chew 81 mg daily      hydrochlorothiazide (HYDRODIURIL) 25 mg tablet hydrochlorothiazide 25 mg tablet   take 1 tablet by mouth once daily      HYDROcodone-acetaminophen (NORCO) 5-325 mg per tablet hydrocodone 5 mg-acetaminophen 325 mg tablet   take 1 tablet by mouth every 8 hours if needed for pain      losartan (COZAAR) 100 MG tablet losartan 100 mg tablet   take 1 tablet by mouth once daily      losartan-hydrochlorothiazide (HYZAAR) 100-12 5 MG per tablet daily   0    metFORMIN (GLUCOPHAGE) 500 mg tablet Take 1,000 mg by mouth 2 (two) times a day with meals      metoprolol succinate (TOPROL-XL) 50 mg 24 hr tablet Take 50 mg by mouth every evening      Metoprolol-HCTZ ER 50-12 5 MG TB24       Multiple Vitamin (multivitamin) tablet Take 1 tablet by mouth daily      simvastatin (ZOCOR) 40 mg tablet Take 40 mg by mouth daily at bedtime      fluticasone (FLONASE) 50 mcg/act nasal spray 2 squirts in each nostril ONCE TO TWICE DAILY (Patient not taking: Reported on 6/17/2021)       No current facility-administered medications for this visit       Past Medical History:   Diagnosis Date    Diabetes mellitus ([facility] [address] )     High cholesterol     Hypertension      Past Surgical History:   Procedure Laterality Date    BUNIONECTOMY Right 11/24/2020    Procedure: RIGHT HAV CORRECTION,;  Surgeon: Eric Hyde DPM;  [facility];  Service: Podiatry    INCISION AND DRAINAGE OF WOUND Right 10/5/2016    Procedure: INCISION AND DRAINAGE (I&D) EXTREMITY;  Surgeon: Eric Hyde DPM;  [facility];  Service:     PILONIDAL CYST EXCISION      [place] REPAIR OF HAMMERTOE,ONE Right 2/19/2019    Procedure: THIRD HAMMER TOE CORRECTION;  Surgeon: Eric Hyde DPM;  [facility];  Service: Podiatry    TOE OSTEOTOMY Right 3/14/2017    Procedure: HAMMERTOE CORRECTION R 2 ;  Surgeon: Eric Hyde DPM;  [facility];  Service:     TOE OSTEOTOMY Left 11/24/2020    Procedure: LEFT HT CORRECTION TOE;  Surgeon: Manish Villaseñor DPM;  [facility];  Service: [facility]     Social History     Socioeconomic History    Marital status: /Civil Union     Spouse name: None    Number of children: None    Years of education: None    Highest education level: None   Occupational History    None   Tobacco Use    Smoking status: Never Smoker    Smokeless tobacco: Never Used    Tobacco comment: Never smoked but exposed to second hand smoke from birth until 19's   Vaping Use    Vaping Use: Never assessed   Substance and Sexual Activity    Alcohol use: No    Drug use: No    Sexual activity: Not Currently     Partners: Female     Birth control/protection: Abstinence   Other Topics Concern    None   Social History Narrative    None     Social Determinants of Health     Financial Resource Strain:     Difficulty of Paying Living Expenses:    Food Insecurity:     Worried About Running Out of Food in the Last Year:     Ran Out of Food in the Last Year:    Transportation Needs:     Lack of Transportation (Medical):  Lack of Transportation (Non-Medical):    Physical Activity:     Days of Exercise per Week:     Minutes of Exercise per Session:    Stress:     Feeling of Stress :    Social Connections:     Frequency of Communication with Friends and Family:     Frequency of Social Gatherings with Friends and Family:     Attends Taoism Services:     Active Member of Clubs or Organizations:     Attends Club or Organization Meetings:     Marital Status:    Intimate Partner Violence:     Fear of Current or Ex-Partner:     Emotionally Abused:     Physically Abused:     Sexually Abused:      Family History   Problem Relation Age of Onset    Cancer Father         Leukemia            PHYSICAL EXAM:    Vitals:    06/17/21 1456   BP: 154/88   Pulse: 75   Weight: (!) 138 kg (305 lb)   Height: 6' 4" (1 93 m)     General Appearance:   Alert and oriented x 3   Cooperative, and in no respiratory distress   HEENT:   Normocephalic, atraumatic, anicteric      Neck:  Supple, symmetrical, trachea midline   Lungs:   Clear to auscultation bilaterally    Heart[de-identified]   Regular rate and rhythm   Positive murmur heard  Abdomen:   Soft, non-tender, non-distended; normal bowel sounds; no masses, no organomegaly    Genitalia:   Deferred    Rectal:   Deferred    Extremities:  No cyanosis, clubbing or edema    Pulses:  2+ and symmetric all extremities    Skin:  Skin color, texture, turgor normal, no rashes or lesions    Lymph nodes:  No palpable cervical or supraclavicular lymphadenopathy        Lab Results:   Results from last 6 Months   Lab Units 05/21/21  1336   WBC Thousand/uL 6 32   HEMOGLOBIN g/dL 13 2   HEMATOCRIT % 41 5   PLATELETS Thousands/uL 159   NEUTROS PCT % 70   LYMPHS PCT % 12*   MONOS PCT % 9   EOS PCT % 7*     Results from last 6 Months   Lab Units 05/21/21  1336   POTASSIUM mmol/L 4 3   CHLORIDE mmol/L 101   CO2 mmol/L 30   BUN mg/dL 20   CREATININE mg/dL 1 05   CALCIUM mg/dL 8 7   ALK PHOS U/L 65   ALT U/L 48   AST U/L 29               Imaging Studies: I have personally reviewed pertinent imaging studies  CTA chest wo w contrast    Result Date: 5/28/2021  Impression: 4 5 cm ascending thoracic aortic aneurysm, stable by report  No dissection  No acute intrathoracic pathology  Bulky mesenteric adenopathy and retroperitoneal adenopathy with several splenic lesions  This is concerning for lymphoma but could also be due to metastatic disease from unknown primary  I personally discussed this study with Divina Lee on 5/28/2021 at 1:10 PM  Workstation performed: [id number]       ASSESSMENT and PLAN:      1)   Mesenteric adenopathy/retroperitoneal adenopathy, need for screening colonoscopy -  We will schedule both EGD and colonoscopy  Plans can also be adjusted when PET scan results return  We are planning for test within 1-2 weeks  We went over how EGD and colonoscopy is performed  Patient voices understanding

## 2021-06-17 NOTE — PROGRESS NOTES
[facility]  Huong Kirt 60 y o  male MRN: [id number]       CC:New patient to establish for screening colonoscopy, abnormal CT of the chest    HPI:  Nadia Hathaway is a 76-year-old male with history of hypertension, hyperlipidemia, and type 2 diabetes  Patient is here to establish with GI, and was referred to us by Hematology-Oncology for abnormal CT of the chest and need for screening colonoscopy  Patient had CT of the chest on  528 revealing a 4 5 cm ascending  Thoracic aortic aneurysm  Bulky mesenteric adenopathy and retroperitoneal adenopathy with several splenic lesions also seen  Could represent lymphoma, but could represent metastatic disease due to unknown primary per CT scan report  Patient reports that he has had no symptoms other than back pain  He denies constipation, diarrhea, signs of overt GI bleeding, nausea, vomiting or lack of appetite  He has already seen Hematology/Oncology  His PET scan is scheduled for next Tuesday  He has never had an EGD or colonoscopy  Review of Systems:    CONSTITUTIONAL: Denies any fever, chills, or rigors  Good appetite, and no recent weight loss  HEENT: No earache or tinnitus  Denies hearing loss or visual disturbances  CARDIOVASCULAR: No chest pain or palpitations  RESPIRATORY: Denies any cough, hemoptysis, shortness of breath or dyspnea on exertion  GASTROINTESTINAL: As noted in the History of Present Illness  GENITOURINARY: No problems with urination  Denies any hematuria or dysuria  NEUROLOGIC: No dizziness or vertigo, denies headaches  MUSCULOSKELETAL: Denies any muscle or joint pain  SKIN: Denies skin rashes or itching  ENDOCRINE: Denies excessive thirst  Denies intolerance to heat or cold  PSYCHOSOCIAL: Denies depression or anxiety  Denies any recent memory loss         Current Outpatient Medications   Medication Sig Dispense Refill    allopurinol (ZYLOPRIM) 100 mg tablet Take 100 mg by mouth daily      aspirin 81 mg chewable tablet Chew 81 mg daily      hydrochlorothiazide (HYDRODIURIL) 25 mg tablet hydrochlorothiazide 25 mg tablet   take 1 tablet by mouth once daily      HYDROcodone-acetaminophen (NORCO) 5-325 mg per tablet hydrocodone 5 mg-acetaminophen 325 mg tablet   take 1 tablet by mouth every 8 hours if needed for pain      losartan (COZAAR) 100 MG tablet losartan 100 mg tablet   take 1 tablet by mouth once daily      losartan-hydrochlorothiazide (HYZAAR) 100-12 5 MG per tablet daily   0    metFORMIN (GLUCOPHAGE) 500 mg tablet Take 1,000 mg by mouth 2 (two) times a day with meals      metoprolol succinate (TOPROL-XL) 50 mg 24 hr tablet Take 50 mg by mouth every evening      Metoprolol-HCTZ ER 50-12 5 MG TB24       Multiple Vitamin (multivitamin) tablet Take 1 tablet by mouth daily      simvastatin (ZOCOR) 40 mg tablet Take 40 mg by mouth daily at bedtime      fluticasone (FLONASE) 50 mcg/act nasal spray 2 squirts in each nostril ONCE TO TWICE DAILY (Patient not taking: Reported on 6/17/2021)       No current facility-administered medications for this visit       Past Medical History:   Diagnosis Date    Diabetes mellitus ([facility] [address] )     High cholesterol     Hypertension      Past Surgical History:   Procedure Laterality Date    BUNIONECTOMY Right 11/24/2020    Procedure: RIGHT HAV CORRECTION,;  Surgeon: Olga Coats DPM;  [facility];  Service: Podiatry    INCISION AND DRAINAGE OF WOUND Right 10/5/2016    Procedure: INCISION AND DRAINAGE (I&D) EXTREMITY;  Surgeon: Olga Coats DPM;  [facility];  Service:     PILONIDAL CYST EXCISION      [place] REPAIR OF HAMMERTOE,ONE Right 2/19/2019    Procedure: THIRD HAMMER TOE CORRECTION;  Surgeon: Olga Coats DPM;  [facility];  Service: Podiatry    TOE OSTEOTOMY Right 3/14/2017    Procedure: HAMMERTOE CORRECTION R 2 ;  Surgeon: Olga Coats DPM;  [facility];  Service:     TOE OSTEOTOMY Left 11/24/2020    Procedure: LEFT HT CORRECTION TOE;  Surgeon: Colt Fernandez DPM;  [facility];  Service: [facility] [address]     Social History     Socioeconomic History    Marital status: /Civil Union     Spouse name: None    Number of children: None    Years of education: None    Highest education level: None   Occupational History    None   Tobacco Use    Smoking status: Never Smoker    Smokeless tobacco: Never Used    Tobacco comment: Never smoked but exposed to second hand smoke from birth until 19's   Vaping Use    Vaping Use: Never assessed   Substance and Sexual Activity    Alcohol use: No    Drug use: No    Sexual activity: Not Currently     Partners: Female     Birth control/protection: Abstinence   Other Topics Concern    None   Social History Narrative    None     Social Determinants of Health     Financial Resource Strain:     Difficulty of Paying Living Expenses:    Food Insecurity:     Worried About Running Out of Food in the Last Year:     Ran Out of Food in the Last Year:    Transportation Needs:     Lack of Transportation (Medical):  Lack of Transportation (Non-Medical):    Physical Activity:     Days of Exercise per Week:     Minutes of Exercise per Session:    Stress:     Feeling of Stress :    Social Connections:     Frequency of Communication with Friends and Family:     Frequency of Social Gatherings with Friends and Family:     Attends Mosque Services:     Active Member of Clubs or Organizations:     Attends Club or Organization Meetings:     Marital Status:    Intimate Partner Violence:     Fear of Current or Ex-Partner:     Emotionally Abused:     Physically Abused:     Sexually Abused:      Family History   Problem Relation Age of Onset    Cancer Father         Leukemia            PHYSICAL EXAM:    Vitals:    06/17/21 1456   BP: 154/88   Pulse: 75   Weight: (!) 138 kg (305 lb)   Height: 6' 4" (1 93 m)     General Appearance:   Alert and oriented x 3   Cooperative, and in no respiratory distress   HEENT:   Normocephalic, atraumatic, anicteric      Neck:  Supple, symmetrical, trachea midline   Lungs:   Clear to auscultation bilaterally    Heart[de-identified]   Regular rate and rhythm   Positive murmur heard  Abdomen:   Soft, non-tender, non-distended; normal bowel sounds; no masses, no organomegaly    Genitalia:   Deferred    Rectal:   Deferred    Extremities:  No cyanosis, clubbing or edema    Pulses:  2+ and symmetric all extremities    Skin:  Skin color, texture, turgor normal, no rashes or lesions    Lymph nodes:  No palpable cervical or supraclavicular lymphadenopathy        Lab Results:   Results from last 6 Months   Lab Units 05/21/21  1336   WBC Thousand/uL 6 32   HEMOGLOBIN g/dL 13 2   HEMATOCRIT % 41 5   PLATELETS Thousands/uL 159   NEUTROS PCT % 70   LYMPHS PCT % 12*   MONOS PCT % 9   EOS PCT % 7*     Results from last 6 Months   Lab Units 05/21/21  1336   POTASSIUM mmol/L 4 3   CHLORIDE mmol/L 101   CO2 mmol/L 30   BUN mg/dL 20   CREATININE mg/dL 1 05   CALCIUM mg/dL 8 7   ALK PHOS U/L 65   ALT U/L 48   AST U/L 29               Imaging Studies: I have personally reviewed pertinent imaging studies  CTA chest wo w contrast    Result Date: 5/28/2021  Impression: 4 5 cm ascending thoracic aortic aneurysm, stable by report  No dissection  No acute intrathoracic pathology  Bulky mesenteric adenopathy and retroperitoneal adenopathy with several splenic lesions  This is concerning for lymphoma but could also be due to metastatic disease from unknown primary  I personally discussed this study with Nii Rebolledo on 5/28/2021 at 1:10 PM  Workstation performed: [id number]       ASSESSMENT and PLAN:      1)   Mesenteric adenopathy/retroperitoneal adenopathy, need for screening colonoscopy -  We will schedule both EGD and colonoscopy  Plans can also be adjusted when PET scan results return  We are planning for test within 1-2 weeks  We went over how EGD and colonoscopy is performed  Patient voices understanding

## 2021-06-18 ENCOUNTER — HOSPITAL ENCOUNTER (OUTPATIENT)
Dept: CT IMAGING | Facility: HOSPITAL | Age: 66
Discharge: HOME | End: 2021-06-18
Payer: COMMERCIAL

## 2021-06-18 ENCOUNTER — TELEPHONE (OUTPATIENT)
Dept: HEMATOLOGY ONCOLOGY | Facility: CLINIC | Age: 66
End: 2021-06-18

## 2021-06-18 DIAGNOSIS — R59.0 RETROPERITONEAL LYMPHADENOPATHY: ICD-10-CM

## 2021-06-18 DIAGNOSIS — R59.0 MESENTERIC LYMPHADENOPATHY: Primary | ICD-10-CM

## 2021-06-18 DIAGNOSIS — R59.0 MESENTERIC LYMPHADENOPATHY: ICD-10-CM

## 2021-06-18 PROCEDURE — 74177 CT ABD & PELVIS W/CONTRAST: CPT

## 2021-06-18 RX ADMIN — IOHEXOL 100 ML: 350 INJECTION, SOLUTION INTRAVENOUS at 15:47

## 2021-06-18 NOTE — TELEPHONE ENCOUNTER
Spoke to patient  CT scheduled for today at 4pm at the [facility]  Patient aware and okay with this appt

## 2021-06-18 NOTE — TELEPHONE ENCOUNTER
Left message for patient that his PET scan scheduled for 6/22 had to be canceled due to insurance denial  In place of the PET a STAT CT has been ordered  I need to speak with patient ASAP to find out if he will be available for this testing  Please transfer patient to me when he calls back

## 2021-06-18 NOTE — TELEPHONE ENCOUNTER
Transferring Call to Another Office   Who is Calling  Patient    Wants to speak with Abeba Sit    Transferred to  [facility]    Result  Transferred

## 2021-06-21 ENCOUNTER — TELEPHONE (OUTPATIENT)
Dept: GASTROENTEROLOGY | Facility: HOSPITAL | Age: 66
End: 2021-06-21

## 2021-06-22 DIAGNOSIS — R59.0 LYMPHADENOPATHY, RETROPERITONEAL: Primary | ICD-10-CM

## 2021-06-22 RX ORDER — SODIUM CHLORIDE 9 MG/ML
75 INJECTION, SOLUTION INTRAVENOUS CONTINUOUS
Status: CANCELLED | OUTPATIENT
Start: 2021-06-22

## 2021-06-23 ENCOUNTER — TELEPHONE (OUTPATIENT)
Dept: HEMATOLOGY ONCOLOGY | Facility: CLINIC | Age: 66
End: 2021-06-23

## 2021-06-23 NOTE — TELEPHONE ENCOUNTER
Spoke to patient to go over lymph node bx date and f/u appt with Dr Lena Elam  Patient aware and okay with all dates

## 2021-06-25 ENCOUNTER — TELEPHONE (OUTPATIENT)
Dept: GASTROENTEROLOGY | Facility: HOSPITAL | Age: 66
End: 2021-06-25

## 2021-06-28 ENCOUNTER — ANESTHESIA EVENT (OUTPATIENT)
Dept: GASTROENTEROLOGY | Facility: HOSPITAL | Age: 66
End: 2021-06-28

## 2021-06-28 ENCOUNTER — HOSPITAL ENCOUNTER (OUTPATIENT)
Dept: GASTROENTEROLOGY | Facility: HOSPITAL | Age: 66
Setting detail: OUTPATIENT SURGERY
Discharge: HOME | End: 2021-06-28
Attending: INTERNAL MEDICINE
Payer: COMMERCIAL

## 2021-06-28 ENCOUNTER — ANESTHESIA (OUTPATIENT)
Dept: GASTROENTEROLOGY | Facility: HOSPITAL | Age: 66
End: 2021-06-28

## 2021-06-28 VITALS
BODY MASS INDEX: 36.48 KG/M2 | WEIGHT: 299.61 LBS | SYSTOLIC BLOOD PRESSURE: 126 MMHG | HEART RATE: 67 BPM | HEIGHT: 76 IN | TEMPERATURE: 97.7 F | DIASTOLIC BLOOD PRESSURE: 82 MMHG | OXYGEN SATURATION: 94 % | RESPIRATION RATE: 17 BRPM

## 2021-06-28 DIAGNOSIS — Z12.11 SCREENING FOR MALIGNANT NEOPLASM OF COLON: ICD-10-CM

## 2021-06-28 DIAGNOSIS — R93.89 ABNORMAL CT OF THE CHEST: ICD-10-CM

## 2021-06-28 DIAGNOSIS — R59.0 LYMPHADENOPATHY, ABDOMINAL: ICD-10-CM

## 2021-06-28 PROBLEM — C48.1: Status: ACTIVE | Noted: 2021-06-01

## 2021-06-28 LAB — GLUCOSE SERPL-MCNC: 113 MG/DL (ref 65–140)

## 2021-06-28 PROCEDURE — 45378 DIAGNOSTIC COLONOSCOPY: CPT | Performed by: INTERNAL MEDICINE

## 2021-06-28 PROCEDURE — 88305 TISSUE EXAM BY PATHOLOGIST: CPT | Performed by: PATHOLOGY

## 2021-06-28 PROCEDURE — 82948 REAGENT STRIP/BLOOD GLUCOSE: CPT

## 2021-06-28 PROCEDURE — 43239 EGD BIOPSY SINGLE/MULTIPLE: CPT | Performed by: INTERNAL MEDICINE

## 2021-06-28 RX ORDER — SODIUM CHLORIDE, SODIUM LACTATE, POTASSIUM CHLORIDE, CALCIUM CHLORIDE 600; 310; 30; 20 MG/100ML; MG/100ML; MG/100ML; MG/100ML
125 INJECTION, SOLUTION INTRAVENOUS CONTINUOUS
Status: DISCONTINUED | OUTPATIENT
Start: 2021-06-28 | End: 2021-07-02 | Stop reason: HOSPADM

## 2021-06-28 RX ORDER — PROPOFOL 10 MG/ML
INJECTION, EMULSION INTRAVENOUS AS NEEDED
Status: DISCONTINUED | OUTPATIENT
Start: 2021-06-28 | End: 2021-06-28

## 2021-06-28 RX ORDER — KETAMINE HCL IN NACL, ISO-OSM 100MG/10ML
SYRINGE (ML) INJECTION AS NEEDED
Status: DISCONTINUED | OUTPATIENT
Start: 2021-06-28 | End: 2021-06-28

## 2021-06-28 RX ORDER — PANTOPRAZOLE SODIUM 40 MG/1
40 TABLET, DELAYED RELEASE ORAL DAILY
Qty: 30 TABLET | Refills: 2 | Status: SHIPPED | OUTPATIENT
Start: 2021-06-28 | End: 2021-10-18 | Stop reason: SDUPTHER

## 2021-06-28 RX ORDER — LIDOCAINE HYDROCHLORIDE 20 MG/ML
INJECTION, SOLUTION EPIDURAL; INFILTRATION; INTRACAUDAL; PERINEURAL AS NEEDED
Status: DISCONTINUED | OUTPATIENT
Start: 2021-06-28 | End: 2021-06-28

## 2021-06-28 RX ADMIN — PROPOFOL 30 MG: 10 INJECTION, EMULSION INTRAVENOUS at 08:20

## 2021-06-28 RX ADMIN — PROPOFOL 50 MG: 10 INJECTION, EMULSION INTRAVENOUS at 08:24

## 2021-06-28 RX ADMIN — PROPOFOL 50 MG: 10 INJECTION, EMULSION INTRAVENOUS at 08:28

## 2021-06-28 RX ADMIN — PROPOFOL 30 MG: 10 INJECTION, EMULSION INTRAVENOUS at 08:32

## 2021-06-28 RX ADMIN — PROPOFOL 100 MG: 10 INJECTION, EMULSION INTRAVENOUS at 08:18

## 2021-06-28 RX ADMIN — Medication 30 MG: at 08:18

## 2021-06-28 RX ADMIN — PROPOFOL 20 MG: 10 INJECTION, EMULSION INTRAVENOUS at 08:22

## 2021-06-28 RX ADMIN — LIDOCAINE HYDROCHLORIDE 100 MG: 20 INJECTION, SOLUTION EPIDURAL; INFILTRATION; INTRACAUDAL at 08:18

## 2021-06-28 RX ADMIN — PROPOFOL 20 MG: 10 INJECTION, EMULSION INTRAVENOUS at 08:36

## 2021-06-28 RX ADMIN — SODIUM CHLORIDE, SODIUM LACTATE, POTASSIUM CHLORIDE, AND CALCIUM CHLORIDE 125 ML/HR: .6; .31; .03; .02 INJECTION, SOLUTION INTRAVENOUS at 07:12

## 2021-06-28 NOTE — ANESTHESIA POSTPROCEDURE EVALUATION
Post-Op Assessment Note    CV Status:  Stable    Pain management: adequate     Mental Status:  Awake and sleepy   Hydration Status:  Euvolemic   PONV Controlled:  Controlled   Airway Patency:  Patent      Post Op Vitals Reviewed: Yes      Staff: CRNA         No complications documented      /77 (06/28/21 0841)    Temp 97 7 °F (36 5 °C) (06/28/21 0841)    Pulse 65 (06/28/21 0841)   Resp 20 (06/28/21 0841)    SpO2 95 % (06/28/21 0841)

## 2021-06-28 NOTE — INTERVAL H&P NOTE
H&P reviewed  After examining the patient I find no changes in the patients condition since the H&P had been written      Vitals:    06/28/21 0703   BP: 134/77   Pulse: 68   Resp: 18   Temp: 98 °F (36 7 °C)   SpO2: 95%

## 2021-06-28 NOTE — ANESTHESIA PREPROCEDURE EVALUATION
Procedure:  COLONOSCOPY  EGD    Relevant Problems   CARDIO   (+) Hypercholesteremia   (+) Hypertension      ENDO   (+) Type 2 diabetes mellitus (HCC)      MUSCULOSKELETAL   (+) Gout      NEURO/PSYCH   (+) Diabetic peripheral neuropathy (HCC)      Other   (+) Obesity with body mass index 30 or greater      Patient had CT of the chest on 5/28 revealing a 4 5 cm ascending  Thoracic aortic aneurysm  Bulky mesenteric adenopathy and retroperitoneal adenopathy with several splenic lesions also seen  Could represent lymphoma, but could represent metastatic disease due to unknown primary per CT scan report  Physical Exam    Airway    Mallampati score: III  TM Distance: >3 FB  Neck ROM: full     Dental   Comment: Denies loose teeth,     Cardiovascular  Cardiovascular exam normal    Pulmonary  Pulmonary exam normal     Other Findings  Portions of exam deferred due to low yield and/or unknown COVID status      Anesthesia Plan  ASA Score- 3     Anesthesia Type- IV sedation with anesthesia with ASA Monitors  Additional Monitors:   Airway Plan:           Plan Factors-Exercise tolerance (METS): >4 METS  Chart reviewed  Existing labs reviewed  Patient summary reviewed  Patient is not a current smoker  Induction- intravenous  Postoperative Plan-     Informed Consent- Anesthetic plan and risks discussed with patient  I personally reviewed this patient with the CRNA  Discussed and agreed on the Anesthesia Plan with the CRNA  Christel Palomares

## 2021-06-29 NOTE — PRE-PROCEDURE INSTRUCTIONS
Phone Consult completed:Pre procedure instructions for Lymph node biopsy reviewed with verbal understanding  Allergies,meds,NPO, and ride  Approximate arrival time given,SDS phone call evening before procedure  Covid vacccine completed April 2021

## 2021-07-06 ENCOUNTER — HOSPITAL ENCOUNTER (OUTPATIENT)
Dept: NON INVASIVE DIAGNOSTICS | Facility: CLINIC | Age: 66
Discharge: HOME | End: 2021-07-06
Payer: COMMERCIAL

## 2021-07-06 DIAGNOSIS — I71.2 THORACIC AORTIC ANEURYSM WITHOUT RUPTURE (HCC): ICD-10-CM

## 2021-07-06 PROCEDURE — 93306 TTE W/DOPPLER COMPLETE: CPT | Performed by: INTERNAL MEDICINE

## 2021-07-06 PROCEDURE — 93306 TTE W/DOPPLER COMPLETE: CPT

## 2021-07-08 ENCOUNTER — HOSPITAL ENCOUNTER (INPATIENT)
Dept: RADIOLOGY | Facility: HOSPITAL | Age: 66
LOS: 3 days | Discharge: HOME | DRG: 907 | End: 2021-07-12
Attending: RADIOLOGY | Admitting: INTERNAL MEDICINE
Payer: COMMERCIAL

## 2021-07-08 ENCOUNTER — APPOINTMENT (OUTPATIENT)
Dept: RADIOLOGY | Facility: HOSPITAL | Age: 66
DRG: 907 | End: 2021-07-08
Payer: COMMERCIAL

## 2021-07-08 ENCOUNTER — ANESTHESIA EVENT (OUTPATIENT)
Dept: RADIOLOGY | Facility: HOSPITAL | Age: 66
DRG: 907 | End: 2021-07-08
Payer: COMMERCIAL

## 2021-07-08 ENCOUNTER — ANESTHESIA (OUTPATIENT)
Dept: RADIOLOGY | Facility: HOSPITAL | Age: 66
DRG: 907 | End: 2021-07-08
Payer: COMMERCIAL

## 2021-07-08 DIAGNOSIS — M10.9 GOUT: ICD-10-CM

## 2021-07-08 DIAGNOSIS — K66.1 RETROPERITONEAL HEMATOMA: ICD-10-CM

## 2021-07-08 DIAGNOSIS — R59.0 LYMPHADENOPATHY, RETROPERITONEAL: ICD-10-CM

## 2021-07-08 DIAGNOSIS — N17.9 AKI (ACUTE KIDNEY INJURY) (HCC): Primary | ICD-10-CM

## 2021-07-08 PROBLEM — D62 ACUTE BLOOD LOSS ANEMIA: Status: ACTIVE | Noted: 2021-07-08

## 2021-07-08 PROBLEM — K68.3 RETROPERITONEAL HEMATOMA: Status: ACTIVE | Noted: 2021-07-08

## 2021-07-08 LAB
ABO GROUP BLD: NORMAL
ANION GAP SERPL CALCULATED.3IONS-SCNC: 7 MMOL/L (ref 4–13)
ANION GAP SERPL CALCULATED.3IONS-SCNC: 8 MMOL/L (ref 4–13)
APTT PPP: 28 SECONDS (ref 23–37)
APTT PPP: 28 SECONDS (ref 23–37)
BASE EXCESS BLDA CALC-SCNC: -3 MMOL/L (ref -2–3)
BASOPHILS # BLD AUTO: 0.05 THOUSANDS/ΜL (ref 0–0.1)
BASOPHILS NFR BLD AUTO: 0 % (ref 0–1)
BLD GP AB SCN SERPL QL: NEGATIVE
BUN SERPL-MCNC: 32 MG/DL (ref 5–25)
BUN SERPL-MCNC: 34 MG/DL (ref 5–25)
CALCIUM SERPL-MCNC: 8.5 MG/DL (ref 8.3–10.1)
CALCIUM SERPL-MCNC: 9.6 MG/DL (ref 8.3–10.1)
CHLORIDE SERPL-SCNC: 102 MMOL/L (ref 100–108)
CHLORIDE SERPL-SCNC: 106 MMOL/L (ref 100–108)
CO2 SERPL-SCNC: 24 MMOL/L (ref 21–32)
CO2 SERPL-SCNC: 26 MMOL/L (ref 21–32)
CREAT SERPL-MCNC: 1.45 MG/DL (ref 0.6–1.3)
CREAT SERPL-MCNC: 1.7 MG/DL (ref 0.6–1.3)
EOSINOPHIL # BLD AUTO: 0.08 THOUSAND/ΜL (ref 0–0.61)
EOSINOPHIL NFR BLD AUTO: 1 % (ref 0–6)
ERYTHROCYTE [DISTWIDTH] IN BLOOD BY AUTOMATED COUNT: 13.8 % (ref 11.6–15.1)
ERYTHROCYTE [DISTWIDTH] IN BLOOD BY AUTOMATED COUNT: 14 % (ref 11.6–15.1)
FIBRINOGEN PPP-MCNC: 354 MG/DL (ref 227–495)
GFR SERPL CREATININE-BSD FRML MDRD: 42 ML/MIN/1.73SQ M
GFR SERPL CREATININE-BSD FRML MDRD: 51 ML/MIN/1.73SQ M
GLUCOSE SERPL-MCNC: 143 MG/DL (ref 65–140)
GLUCOSE SERPL-MCNC: 173 MG/DL (ref 65–140)
GLUCOSE SERPL-MCNC: 210 MG/DL (ref 65–140)
GLUCOSE SERPL-MCNC: 217 MG/DL (ref 65–140)
GLUCOSE SERPL-MCNC: 246 MG/DL (ref 65–140)
HCO3 BLDA-SCNC: 23 MMOL/L (ref 22–28)
HCT VFR BLD AUTO: 33.1 % (ref 36.5–49.3)
HCT VFR BLD AUTO: 38 % (ref 36.5–49.3)
HCT VFR BLD CALC: 32 % (ref 36.5–49.3)
HGB BLD-MCNC: 10.5 G/DL (ref 12–17)
HGB BLD-MCNC: 12.1 G/DL (ref 12–17)
HGB BLDA-MCNC: 10.9 G/DL (ref 12–17)
IMM GRANULOCYTES # BLD AUTO: 0.13 THOUSAND/UL (ref 0–0.2)
IMM GRANULOCYTES NFR BLD AUTO: 1 % (ref 0–2)
INR PPP: 1.04 (ref 0.84–1.19)
INR PPP: 1.05 (ref 0.84–1.19)
INR PPP: 1.12 (ref 0.84–1.19)
LYMPHOCYTES # BLD AUTO: 0.59 THOUSANDS/ΜL (ref 0.6–4.47)
LYMPHOCYTES NFR BLD AUTO: 3 % (ref 14–44)
MCH RBC QN AUTO: 27.6 PG (ref 26.8–34.3)
MCH RBC QN AUTO: 27.9 PG (ref 26.8–34.3)
MCHC RBC AUTO-ENTMCNC: 31.7 G/DL (ref 31.4–37.4)
MCHC RBC AUTO-ENTMCNC: 31.8 G/DL (ref 31.4–37.4)
MCV RBC AUTO: 87 FL (ref 82–98)
MCV RBC AUTO: 88 FL (ref 82–98)
MONOCYTES # BLD AUTO: 0.97 THOUSAND/ΜL (ref 0.17–1.22)
MONOCYTES NFR BLD AUTO: 6 % (ref 4–12)
NEUTROPHILS # BLD AUTO: 15.77 THOUSANDS/ΜL (ref 1.85–7.62)
NEUTS SEG NFR BLD AUTO: 89 % (ref 43–75)
NRBC BLD AUTO-RTO: 0 /100 WBCS
PCO2 BLD: 24 MMOL/L (ref 21–32)
PCO2 BLD: 45.8 MM HG (ref 36–44)
PH BLD: 7.31 [PH] (ref 7.35–7.45)
PLATELET # BLD AUTO: 168 THOUSANDS/UL (ref 149–390)
PLATELET # BLD AUTO: 217 THOUSANDS/UL (ref 149–390)
PMV BLD AUTO: 10.6 FL (ref 8.9–12.7)
PMV BLD AUTO: 11.3 FL (ref 8.9–12.7)
PO2 BLD: 132 MM HG (ref 75–129)
POTASSIUM BLD-SCNC: 4.5 MMOL/L (ref 3.5–5.3)
POTASSIUM SERPL-SCNC: 4.4 MMOL/L (ref 3.5–5.3)
POTASSIUM SERPL-SCNC: 4.7 MMOL/L (ref 3.5–5.3)
PROTHROMBIN TIME: 13.6 SECONDS (ref 11.6–14.5)
PROTHROMBIN TIME: 13.7 SECONDS (ref 11.6–14.5)
PROTHROMBIN TIME: 14.3 SECONDS (ref 11.6–14.5)
PROTHROMBIN TIME: 14.4 SECONDS (ref 11.6–14.5)
RBC # BLD AUTO: 3.8 MILLION/UL (ref 3.88–5.62)
RBC # BLD AUTO: 4.33 MILLION/UL (ref 3.88–5.62)
RH BLD: POSITIVE
SAO2 % BLD FROM PO2: 99 % (ref 60–85)
SODIUM BLD-SCNC: 141 MMOL/L (ref 136–145)
SODIUM SERPL-SCNC: 136 MMOL/L (ref 136–145)
SODIUM SERPL-SCNC: 137 MMOL/L (ref 136–145)
SPECIMEN EXPIRATION DATE: NORMAL
SPECIMEN SOURCE: ABNORMAL
THROMBIN TIME: 17.6 SECONDS (ref 14.7–18.4)
THROMBIN TIME: 17.8 SECONDS (ref 14.7–18.4)
WBC # BLD AUTO: 14.49 THOUSAND/UL (ref 4.31–10.16)
WBC # BLD AUTO: 17.59 THOUSAND/UL (ref 4.31–10.16)

## 2021-07-08 PROCEDURE — 85025 COMPLETE CBC W/AUTO DIFF WBC: CPT | Performed by: PHYSICIAN ASSISTANT

## 2021-07-08 PROCEDURE — 07DD3ZX EXTRACTION OF AORTIC LYMPHATIC, PERCUTANEOUS APPROACH, DIAGNOSTIC: ICD-10-PCS | Performed by: STUDENT IN AN ORGANIZED HEALTH CARE EDUCATION/TRAINING PROGRAM

## 2021-07-08 PROCEDURE — 86900 BLOOD TYPING SEROLOGIC ABO: CPT | Performed by: NURSE ANESTHETIST, CERTIFIED REGISTERED

## 2021-07-08 PROCEDURE — 37242 VASC EMBOLIZE/OCCLUDE ARTERY: CPT

## 2021-07-08 PROCEDURE — 80048 BASIC METABOLIC PNL TOTAL CA: CPT | Performed by: STUDENT IN AN ORGANIZED HEALTH CARE EDUCATION/TRAINING PROGRAM

## 2021-07-08 PROCEDURE — 82948 REAGENT STRIP/BLOOD GLUCOSE: CPT

## 2021-07-08 PROCEDURE — 37244 VASC EMBOLIZE/OCCLUDE BLEED: CPT | Performed by: STUDENT IN AN ORGANIZED HEALTH CARE EDUCATION/TRAINING PROGRAM

## 2021-07-08 PROCEDURE — 85014 HEMATOCRIT: CPT

## 2021-07-08 PROCEDURE — 77012 CT SCAN FOR NEEDLE BIOPSY: CPT

## 2021-07-08 PROCEDURE — 85670 THROMBIN TIME PLASMA: CPT | Performed by: NURSE ANESTHETIST, CERTIFIED REGISTERED

## 2021-07-08 PROCEDURE — 85384 FIBRINOGEN ACTIVITY: CPT | Performed by: NURSE ANESTHETIST, CERTIFIED REGISTERED

## 2021-07-08 PROCEDURE — 85610 PROTHROMBIN TIME: CPT | Performed by: STUDENT IN AN ORGANIZED HEALTH CARE EDUCATION/TRAINING PROGRAM

## 2021-07-08 PROCEDURE — C1769 GUIDE WIRE: HCPCS

## 2021-07-08 PROCEDURE — 99024 POSTOP FOLLOW-UP VISIT: CPT | Performed by: STUDENT IN AN ORGANIZED HEALTH CARE EDUCATION/TRAINING PROGRAM

## 2021-07-08 PROCEDURE — 88342 IMHCHEM/IMCYTCHM 1ST ANTB: CPT | Performed by: PATHOLOGY

## 2021-07-08 PROCEDURE — 88305 TISSUE EXAM BY PATHOLOGIST: CPT | Performed by: PATHOLOGY

## 2021-07-08 PROCEDURE — 99220 PR INITIAL OBSERVATION CARE/DAY 70 MINUTES: CPT | Performed by: INTERNAL MEDICINE

## 2021-07-08 PROCEDURE — 88185 FLOWCYTOMETRY/TC ADD-ON: CPT

## 2021-07-08 PROCEDURE — C1894 INTRO/SHEATH, NON-LASER: HCPCS

## 2021-07-08 PROCEDURE — 99152 MOD SED SAME PHYS/QHP 5/>YRS: CPT

## 2021-07-08 PROCEDURE — 74174 CTA ABD&PLVS W/CONTRAST: CPT

## 2021-07-08 PROCEDURE — 88365 INSITU HYBRIDIZATION (FISH): CPT | Performed by: PATHOLOGY

## 2021-07-08 PROCEDURE — 99152 MOD SED SAME PHYS/QHP 5/>YRS: CPT | Performed by: STUDENT IN AN ORGANIZED HEALTH CARE EDUCATION/TRAINING PROGRAM

## 2021-07-08 PROCEDURE — 88341 IMHCHEM/IMCYTCHM EA ADD ANTB: CPT | Performed by: PATHOLOGY

## 2021-07-08 PROCEDURE — 49180 BIOPSY ABDOMINAL MASS: CPT

## 2021-07-08 PROCEDURE — G1004 CDSM NDSC: HCPCS

## 2021-07-08 PROCEDURE — 85611 PROTHROMBIN TEST: CPT | Performed by: NURSE ANESTHETIST, CERTIFIED REGISTERED

## 2021-07-08 PROCEDURE — 77012 CT SCAN FOR NEEDLE BIOPSY: CPT | Performed by: STUDENT IN AN ORGANIZED HEALTH CARE EDUCATION/TRAINING PROGRAM

## 2021-07-08 PROCEDURE — 88184 FLOWCYTOMETRY/ TC 1 MARKER: CPT | Performed by: INTERNAL MEDICINE

## 2021-07-08 PROCEDURE — 85730 THROMBOPLASTIN TIME PARTIAL: CPT | Performed by: NURSE ANESTHETIST, CERTIFIED REGISTERED

## 2021-07-08 PROCEDURE — 86901 BLOOD TYPING SEROLOGIC RH(D): CPT | Performed by: NURSE ANESTHETIST, CERTIFIED REGISTERED

## 2021-07-08 PROCEDURE — 82947 ASSAY GLUCOSE BLOOD QUANT: CPT

## 2021-07-08 PROCEDURE — 36215 PLACE CATHETER IN ARTERY: CPT | Performed by: STUDENT IN AN ORGANIZED HEALTH CARE EDUCATION/TRAINING PROGRAM

## 2021-07-08 PROCEDURE — 99153 MOD SED SAME PHYS/QHP EA: CPT

## 2021-07-08 PROCEDURE — 76937 US GUIDE VASCULAR ACCESS: CPT | Performed by: STUDENT IN AN ORGANIZED HEALTH CARE EDUCATION/TRAINING PROGRAM

## 2021-07-08 PROCEDURE — 86850 RBC ANTIBODY SCREEN: CPT | Performed by: NURSE ANESTHETIST, CERTIFIED REGISTERED

## 2021-07-08 PROCEDURE — 49180 BIOPSY ABDOMINAL MASS: CPT | Performed by: STUDENT IN AN ORGANIZED HEALTH CARE EDUCATION/TRAINING PROGRAM

## 2021-07-08 PROCEDURE — 85610 PROTHROMBIN TIME: CPT | Performed by: NURSE ANESTHETIST, CERTIFIED REGISTERED

## 2021-07-08 PROCEDURE — 04LK3DZ OCCLUSION OF RIGHT FEMORAL ARTERY WITH INTRALUMINAL DEVICE, PERCUTANEOUS APPROACH: ICD-10-PCS | Performed by: STUDENT IN AN ORGANIZED HEALTH CARE EDUCATION/TRAINING PROGRAM

## 2021-07-08 PROCEDURE — 84295 ASSAY OF SERUM SODIUM: CPT

## 2021-07-08 PROCEDURE — C1760 CLOSURE DEV, VASC: HCPCS

## 2021-07-08 PROCEDURE — 75705 ARTERY X-RAYS SPINE: CPT

## 2021-07-08 PROCEDURE — 85610 PROTHROMBIN TIME: CPT | Performed by: RADIOLOGY

## 2021-07-08 PROCEDURE — 76937 US GUIDE VASCULAR ACCESS: CPT

## 2021-07-08 PROCEDURE — 82803 BLOOD GASES ANY COMBINATION: CPT

## 2021-07-08 PROCEDURE — 85027 COMPLETE CBC AUTOMATED: CPT | Performed by: NURSE ANESTHETIST, CERTIFIED REGISTERED

## 2021-07-08 PROCEDURE — 99217 PR OBSERVATION CARE DISCHARGE MANAGEMENT: CPT | Performed by: INTERNAL MEDICINE

## 2021-07-08 PROCEDURE — C1887 CATHETER, GUIDING: HCPCS

## 2021-07-08 PROCEDURE — 8E0WXBG COMPUTER ASSISTED PROCEDURE OF TRUNK REGION, WITH COMPUTERIZED TOMOGRAPHY: ICD-10-PCS | Performed by: STUDENT IN AN ORGANIZED HEALTH CARE EDUCATION/TRAINING PROGRAM

## 2021-07-08 PROCEDURE — 85732 THROMBOPLASTIN TIME PARTIAL: CPT | Performed by: NURSE ANESTHETIST, CERTIFIED REGISTERED

## 2021-07-08 PROCEDURE — 84132 ASSAY OF SERUM POTASSIUM: CPT

## 2021-07-08 PROCEDURE — 80048 BASIC METABOLIC PNL TOTAL CA: CPT | Performed by: NURSE ANESTHETIST, CERTIFIED REGISTERED

## 2021-07-08 RX ORDER — MIDAZOLAM HYDROCHLORIDE 2 MG/2ML
INJECTION, SOLUTION INTRAMUSCULAR; INTRAVENOUS AS NEEDED
Status: DISCONTINUED | OUTPATIENT
Start: 2021-07-08 | End: 2021-07-08

## 2021-07-08 RX ORDER — LIDOCAINE WITH 8.4% SOD BICARB 0.9%(10ML)
SYRINGE (ML) INJECTION CODE/TRAUMA/SEDATION MEDICATION
Status: COMPLETED | OUTPATIENT
Start: 2021-07-08 | End: 2021-07-08

## 2021-07-08 RX ORDER — PRAVASTATIN SODIUM 40 MG
40 TABLET ORAL
Status: DISCONTINUED | OUTPATIENT
Start: 2021-07-09 | End: 2021-07-12 | Stop reason: HOSPADM

## 2021-07-08 RX ORDER — HYDROCODONE BITARTRATE AND ACETAMINOPHEN 5; 325 MG/1; MG/1
1 TABLET ORAL EVERY 4 HOURS PRN
Status: DISCONTINUED | OUTPATIENT
Start: 2021-07-08 | End: 2021-07-12 | Stop reason: HOSPADM

## 2021-07-08 RX ORDER — KETAMINE HCL IN NACL, ISO-OSM 100MG/10ML
SYRINGE (ML) INJECTION AS NEEDED
Status: DISCONTINUED | OUTPATIENT
Start: 2021-07-08 | End: 2021-07-08

## 2021-07-08 RX ORDER — METOPROLOL SUCCINATE 50 MG/1
50 TABLET, EXTENDED RELEASE ORAL EVERY EVENING
Status: DISCONTINUED | OUTPATIENT
Start: 2021-07-09 | End: 2021-07-12 | Stop reason: HOSPADM

## 2021-07-08 RX ORDER — PROPOFOL 10 MG/ML
INJECTION, EMULSION INTRAVENOUS AS NEEDED
Status: DISCONTINUED | OUTPATIENT
Start: 2021-07-08 | End: 2021-07-08

## 2021-07-08 RX ORDER — FENTANYL CITRATE/PF 50 MCG/ML
25 SYRINGE (ML) INJECTION
Status: DISCONTINUED | OUTPATIENT
Start: 2021-07-08 | End: 2021-07-08 | Stop reason: HOSPADM

## 2021-07-08 RX ORDER — SODIUM CHLORIDE 9 MG/ML
INJECTION, SOLUTION INTRAVENOUS CONTINUOUS PRN
Status: DISCONTINUED | OUTPATIENT
Start: 2021-07-08 | End: 2021-07-08

## 2021-07-08 RX ORDER — FENTANYL CITRATE 50 UG/ML
INJECTION, SOLUTION INTRAMUSCULAR; INTRAVENOUS AS NEEDED
Status: DISCONTINUED | OUTPATIENT
Start: 2021-07-08 | End: 2021-07-08

## 2021-07-08 RX ORDER — SODIUM CHLORIDE 9 MG/ML
125 INJECTION, SOLUTION INTRAVENOUS CONTINUOUS
Status: DISPENSED | OUTPATIENT
Start: 2021-07-08 | End: 2021-07-08

## 2021-07-08 RX ORDER — PROPOFOL 10 MG/ML
INJECTION, EMULSION INTRAVENOUS CONTINUOUS PRN
Status: DISCONTINUED | OUTPATIENT
Start: 2021-07-08 | End: 2021-07-08

## 2021-07-08 RX ORDER — ALLOPURINOL 100 MG/1
100 TABLET ORAL DAILY
Status: DISCONTINUED | OUTPATIENT
Start: 2021-07-09 | End: 2021-07-10

## 2021-07-08 RX ORDER — LIDOCAINE HYDROCHLORIDE 10 MG/ML
INJECTION, SOLUTION EPIDURAL; INFILTRATION; INTRACAUDAL; PERINEURAL
Status: COMPLETED | OUTPATIENT
Start: 2021-07-08 | End: 2021-07-08

## 2021-07-08 RX ORDER — FENTANYL CITRATE 50 UG/ML
INJECTION, SOLUTION INTRAMUSCULAR; INTRAVENOUS CODE/TRAUMA/SEDATION MEDICATION
Status: COMPLETED | OUTPATIENT
Start: 2021-07-08 | End: 2021-07-08

## 2021-07-08 RX ORDER — MIDAZOLAM HYDROCHLORIDE 2 MG/2ML
INJECTION, SOLUTION INTRAMUSCULAR; INTRAVENOUS CODE/TRAUMA/SEDATION MEDICATION
Status: COMPLETED | OUTPATIENT
Start: 2021-07-08 | End: 2021-07-08

## 2021-07-08 RX ORDER — ACETAMINOPHEN 325 MG/1
650 TABLET ORAL EVERY 6 HOURS PRN
Status: DISCONTINUED | OUTPATIENT
Start: 2021-07-08 | End: 2021-07-12 | Stop reason: HOSPADM

## 2021-07-08 RX ORDER — ASPIRIN 81 MG/1
81 TABLET, CHEWABLE ORAL DAILY
Status: DISCONTINUED | OUTPATIENT
Start: 2021-07-09 | End: 2021-07-12 | Stop reason: HOSPADM

## 2021-07-08 RX ORDER — HYDRALAZINE HYDROCHLORIDE 20 MG/ML
10 INJECTION INTRAMUSCULAR; INTRAVENOUS ONCE
Status: DISCONTINUED | OUTPATIENT
Start: 2021-07-08 | End: 2021-07-12 | Stop reason: HOSPADM

## 2021-07-08 RX ORDER — SODIUM CHLORIDE 9 MG/ML
100 INJECTION, SOLUTION INTRAVENOUS CONTINUOUS
Status: DISPENSED | OUTPATIENT
Start: 2021-07-08 | End: 2021-07-09

## 2021-07-08 RX ORDER — OXYCODONE HYDROCHLORIDE 5 MG/1
5 TABLET ORAL ONCE
Status: COMPLETED | OUTPATIENT
Start: 2021-07-08 | End: 2021-07-08

## 2021-07-08 RX ORDER — ONDANSETRON 2 MG/ML
4 INJECTION INTRAMUSCULAR; INTRAVENOUS EVERY 6 HOURS PRN
Status: DISCONTINUED | OUTPATIENT
Start: 2021-07-08 | End: 2021-07-12 | Stop reason: HOSPADM

## 2021-07-08 RX ORDER — FENTANYL CITRATE 50 UG/ML
50 INJECTION, SOLUTION INTRAMUSCULAR; INTRAVENOUS ONCE
Status: COMPLETED | OUTPATIENT
Start: 2021-07-08 | End: 2021-07-08

## 2021-07-08 RX ORDER — PANTOPRAZOLE SODIUM 40 MG/1
40 TABLET, DELAYED RELEASE ORAL
Status: DISCONTINUED | OUTPATIENT
Start: 2021-07-09 | End: 2021-07-12 | Stop reason: HOSPADM

## 2021-07-08 RX ORDER — ONDANSETRON 2 MG/ML
4 INJECTION INTRAMUSCULAR; INTRAVENOUS ONCE AS NEEDED
Status: DISCONTINUED | OUTPATIENT
Start: 2021-07-08 | End: 2021-07-08 | Stop reason: HOSPADM

## 2021-07-08 RX ADMIN — MIDAZOLAM 1 MG: 1 INJECTION INTRAMUSCULAR; INTRAVENOUS at 09:18

## 2021-07-08 RX ADMIN — Medication 35 MG: at 17:35

## 2021-07-08 RX ADMIN — FENTANYL CITRATE 25 MCG: 50 INJECTION INTRAMUSCULAR; INTRAVENOUS at 18:21

## 2021-07-08 RX ADMIN — Medication 15 MG: at 17:31

## 2021-07-08 RX ADMIN — MIDAZOLAM 1 MG: 1 INJECTION INTRAMUSCULAR; INTRAVENOUS at 17:35

## 2021-07-08 RX ADMIN — FENTANYL CITRATE 25 MCG: 50 INJECTION INTRAMUSCULAR; INTRAVENOUS at 18:15

## 2021-07-08 RX ADMIN — FENTANYL CITRATE 50 MCG: 50 INJECTION, SOLUTION INTRAMUSCULAR; INTRAVENOUS at 11:41

## 2021-07-08 RX ADMIN — IOHEXOL 100 ML: 350 INJECTION, SOLUTION INTRAVENOUS at 16:28

## 2021-07-08 RX ADMIN — MIDAZOLAM 1 MG: 1 INJECTION INTRAMUSCULAR; INTRAVENOUS at 09:09

## 2021-07-08 RX ADMIN — OXYCODONE HYDROCHLORIDE 5 MG: 5 TABLET ORAL at 10:43

## 2021-07-08 RX ADMIN — MIDAZOLAM 1 MG: 1 INJECTION INTRAMUSCULAR; INTRAVENOUS at 17:31

## 2021-07-08 RX ADMIN — Medication 10 ML: at 09:18

## 2021-07-08 RX ADMIN — PROPOFOL 20 MG: 10 INJECTION, EMULSION INTRAVENOUS at 17:56

## 2021-07-08 RX ADMIN — PROPOFOL 40 MCG/KG/MIN: 10 INJECTION, EMULSION INTRAVENOUS at 17:56

## 2021-07-08 RX ADMIN — IODIXANOL 30 ML: 320 INJECTION, SOLUTION INTRAVASCULAR at 20:10

## 2021-07-08 RX ADMIN — FENTANYL CITRATE 50 MCG: 50 INJECTION INTRAMUSCULAR; INTRAVENOUS at 09:18

## 2021-07-08 RX ADMIN — LIDOCAINE HYDROCHLORIDE 0.5 ML: 10 INJECTION, SOLUTION EPIDURAL; INFILTRATION; INTRACAUDAL; PERINEURAL at 18:03

## 2021-07-08 RX ADMIN — SODIUM CHLORIDE: 9 INJECTION, SOLUTION INTRAVENOUS at 17:29

## 2021-07-08 RX ADMIN — SODIUM CHLORIDE 75 ML/HR: 0.9 INJECTION, SOLUTION INTRAVENOUS at 08:30

## 2021-07-08 RX ADMIN — SODIUM CHLORIDE: 0.9 INJECTION, SOLUTION INTRAVENOUS at 17:20

## 2021-07-08 RX ADMIN — ONDANSETRON 4 MG: 2 INJECTION INTRAMUSCULAR; INTRAVENOUS at 12:37

## 2021-07-08 RX ADMIN — FENTANYL CITRATE 50 MCG: 50 INJECTION INTRAMUSCULAR; INTRAVENOUS at 09:09

## 2021-07-08 NOTE — PROGRESS NOTES
Patient still reports not feeling well  Still c/o increased pain in the left leg, reports his nausea has improved  Patient is still diaphoretic but is now hypotensive with systolic BP in the 71Q  IR called at this time, AP to come and evaluate

## 2021-07-08 NOTE — ANESTHESIA PROCEDURE NOTES
Arterial Line Insertion  Performed by: Juliocesar Adams MD  Authorized by: Juliocesar Adams MD   Consent: Verbal consent obtained  Risks and benefits: risks, benefits and alternatives were discussed  Consent given by: patient  Patient understanding: patient states understanding of the procedure being performed  Patient consent: the patient's understanding of the procedure matches consent given  Procedure consent: procedure consent matches procedure scheduled  Relevant documents: relevant documents present and verified  Test results: test results available and properly labeled  Site marked: the operative site was marked  Required items: required blood products, implants, devices, and special equipment available  Patient identity confirmed: verbally with patient, arm band and provided demographic data  Time out: Immediately prior to procedure a "time out" was called to verify the correct patient, procedure, equipment, support staff and site/side marked as required  Preparation: Patient was prepped and draped in the usual sterile fashion  Indications: hemodynamic monitoring  Orientation:  Left  Location: radial arterylidocaine (PF) (XYLOCAINE-MPF) 1 % infiltration, 0 5 mL  Sedation:  Patient sedated: yes  Analgesia: see MAR for details  Vitals: Vital signs were monitored during sedation      Procedure Details:  Jonathan's test normal: yes  Needle gauge: 20  Seldinger technique: Seldinger technique used  Number of attempts: 1    Post-procedure:  Post-procedure: dressing applied  Waveform: good waveform and waveform confirmed  Post-procedure CNS: normal and unchanged  Patient tolerance: Patient tolerated the procedure well with no immediate complications and patient tolerated the procedure well with no immediate complications

## 2021-07-08 NOTE — QUICK NOTE
Patient evaluated in short stay  Currently sitting upright  Pain continues 7/10  Pressures have steadily been declining since 1pm  150/84 now 94/51  HR 88  Appears fatigued but comfortable  Will order CTA abdomen/pelvis  Last Cr 1 05 5/21/21

## 2021-07-08 NOTE — PROGRESS NOTES
Interventional Radiology Preprocedure Note    History/Indication for procedure:   Ni Aviles is a 60 y o  male with a PMH of enlarged LNs who presents for CT guided L para-aortic LN biopsy  Relevant past medical history:    Past Medical History:   Diagnosis Date    Diabetes mellitus ([facility] )     High cholesterol     Hypertension      Patient Active Problem List   Diagnosis    Diabetes 1 5, managed as type 2 ([place] [address] )    Hypertension    Hypercholesteremia    Hammer toe of right foot    Stasis dermatitis of both legs    Diabetic peripheral neuropathy ([place] [address] )    Gout    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HCC)    Obesity with body mass index 30 or greater    Type 2 diabetes mellitus (HCC)       /81 (BP Location: Right arm)   Pulse 64   Temp 98 2 °F (36 8 °C) (Oral)   Resp 16   Ht 6' 4" (1 93 m)   Wt 136 kg (300 lb)   SpO2 98%   BMI 36 52 kg/m²     Medications:    Inpatient Medications:     Scheduled Medications:  Current Facility-Administered Medications   Medication Dose Route Frequency Provider Last Rate    sodium chloride  75 mL/hr Intravenous Continuous Susanne Yin MD 75 mL/hr (07/08/21 0830)       Infusions:  sodium chloride, 75 mL/hr, Last Rate: 75 mL/hr (07/08/21 0830)        PRN:      Outpatient Medications:  No current facility-administered medications on file prior to encounter       Current Outpatient Medications on File Prior to Encounter   Medication Sig Dispense Refill    allopurinol (ZYLOPRIM) 100 mg tablet Take 100 mg by mouth daily      losartan (COZAAR) 100 MG tablet losartan 100 mg tablet   take 1 tablet by mouth once daily      metFORMIN (GLUCOPHAGE) 500 mg tablet Take 1,000 mg by mouth 2 (two) times a day with meals      Metoprolol-HCTZ ER 50-12 5 MG TB24       Multiple Vitamin (multivitamin) tablet Take 1 tablet by mouth daily      pantoprazole (PROTONIX) 40 mg tablet Take 1 tablet (40 mg total) by mouth daily 30 tablet 2    simvastatin (ZOCOR) 40 mg tablet Take 40 mg by mouth daily at bedtime      aspirin 81 mg chewable tablet Chew 81 mg daily      fluticasone (FLONASE) 50 mcg/act nasal spray 2 squirts in each nostril ONCE TO TWICE DAILY (Patient not taking: Reported on 6/17/2021)      hydrochlorothiazide (HYDRODIURIL) 25 mg tablet hydrochlorothiazide 25 mg tablet   take 1 tablet by mouth once daily      HYDROcodone-acetaminophen (NORCO) 5-325 mg per tablet hydrocodone 5 mg-acetaminophen 325 mg tablet   take 1 tablet by mouth every 8 hours if needed for pain      losartan-hydrochlorothiazide (HYZAAR) 100-12 5 MG per tablet daily   0    metoprolol succinate (TOPROL-XL) 50 mg 24 hr tablet Take 50 mg by mouth every evening         Allergies   Allergen Reactions    Lisinopril Cough       Anticoagulants: ASA (on hold)    ASA classification: ASA 3 - Patient with moderate systemic disease with functional limitations    Airway Assessment: III (soft and hard palate and base of uvula visible)    Relevant family history: None    Relevant review of systems: None    Prior sedation/anesthesia: yes    Can the patient lie flat?  Yes     NPO Status: yes    Labs:   CBC with diff:   Lab Results   Component Value Date    WBC 6 32 05/21/2021    HGB 13 2 05/21/2021    HCT 41 5 05/21/2021    MCV 85 05/21/2021     05/21/2021    MCH 27 2 05/21/2021    MCHC 31 8 05/21/2021    RDW 14 1 05/21/2021    MPV 10 8 05/21/2021    NRBC 0 05/21/2021     BMP/CMP:  Lab Results   Component Value Date     10/06/2015    K 4 3 05/21/2021    K 4 1 10/06/2015     05/21/2021     10/06/2015    CO2 30 05/21/2021    CO2 30 10/06/2015    ANIONGAP 7 10/06/2015    BUN 20 05/21/2021    BUN 23 10/06/2015    CREATININE 1 05 05/21/2021    CREATININE 0 97 10/06/2015    GLUCOSE 111 10/06/2015    CALCIUM 8 7 05/21/2021    CALCIUM 9 0 10/06/2015    AST 29 05/21/2021    AST 31 10/06/2015    ALT 48 05/21/2021    ALT 48 10/06/2015    ALKPHOS 65 05/21/2021    ALKPHOS 62 10/06/2015    PROT 6 8 10/06/2015 BILITOT 0 44 10/06/2015    EGFR 75 05/21/2021   ,     Coags:   Lab Results   Component Value Date    PTT 39 (H) 10/06/2015    INR 1 14 10/06/2015   ,          Relevant imaging studies:   Reviewed  Directed physical examination:  CONSTITUTIONAL: The patient appeared well in no acute distress  NEUROLOGICAL: alert, awake, answering questions appropriately  PSYCHIATRIC: Affect normal   PULMONARY: No respiratory distress  CARDIAC: normal sinus rhythm on monitor, without tachycardia  GASTROINTESTINAL: abdomen was soft, round, nontender  EXTREMITIES: No cyanosis  Assessment/Plan:   CT guided L para-aortic LN biopsy  Sedation/Anesthesia plan: Moderate sedation will be used as needed for procedure  Consent with alternatives to the procedure, risks and benefits have been explained and discussed with the patient/patient's family: yes

## 2021-07-08 NOTE — ANESTHESIA PREPROCEDURE EVALUATION
Procedure:  IR EMBOLIZATION (SPECIFY VESSEL OR SITE)    Relevant Problems   CARDIO   (+) Hypercholesteremia   (+) Hypertension      ENDO   (+) Diabetes 1 5, managed as type 2 (HCC)   (+) Type 2 diabetes mellitus (HCC)      MUSCULOSKELETAL   (+) Gout      NEURO/PSYCH   (+) Diabetic peripheral neuropathy ([facility] [address] )      7/2021: normal biventricular systolic function, no significant valve issues       Anesthesia Plan  ASA Score- 3 Emergent    Anesthesia Type- IV sedation with anesthesia with ASA Monitors  Additional Monitors: arterial line  Airway Plan:     Comment: General anesthesia, endotracheal tube; standard ASA monitors  Risks and benefits discussed with patient; patient consented and agrees to proceed  I saw and evaluated the patient  If seen with CRNA, we have discussed the anesthetic plan and I am in agreement that the plan is appropriate for the patient  [place]  prone  Plan Factors-    Chart reviewed  Existing labs reviewed  Induction- intravenous  Postoperative Plan-     Informed Consent- Anesthetic plan and risks discussed with patient  I personally reviewed this patient with the CRNA  Discussed and agreed on the Anesthesia Plan with the CRNA  Nubia Maxwell

## 2021-07-08 NOTE — PROGRESS NOTES
Interventional Radiology Preprocedure Note    History/Indication for procedure:   Dave Crow is a 60 y o  male with a PMH of recent L para-aortic LN biopsy  Subsequent CT showed L RP bleed  He presents for arteriography and possible embolization  Relevant past medical history:    Past Medical History:   Diagnosis Date    Diabetes mellitus ([place] [address] )     High cholesterol     Hypertension      Patient Active Problem List   Diagnosis    Diabetes 1 5, managed as type 2 ([place] [address] )    Hypertension    Hypercholesteremia    Hammer toe of right foot    Stasis dermatitis of both legs    Diabetic peripheral neuropathy ([place] [address] )    Gout    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HCC)    Obesity with body mass index 30 or greater    Type 2 diabetes mellitus (HCC)       BP (!) 86/53   Pulse 87   Temp (P) 98 5 °F (36 9 °C)   Resp (P) 12   Ht 6' 4" (1 93 m)   Wt 136 kg (300 lb)   SpO2 98%   BMI 36 52 kg/m²     Medications:    Inpatient Medications:     Scheduled Medications:  Current Facility-Administered Medications   Medication Dose Route Frequency Provider Last Rate    fentaNYL  25 mcg Intravenous Q3 min PRN Shira Valdez CRNA      hydrALAZINE  10 mg Intravenous Once Alicia Bowman MD      ondansetron  4 mg Intravenous Q6H PRN Alicia Bowman MD      ondansetron  4 mg Intravenous Once PRN Shira Valdez CRNA      sodium chloride  1,000 mL Intravenous Once Lola Sutton PA-C      sodium chloride  75 mL/hr Intravenous Continuous Mani Cartagena MD 75 mL/hr (07/08/21 0830)       Infusions:  sodium chloride, 75 mL/hr, Last Rate: 75 mL/hr (07/08/21 0830)        PRN:    fentaNYL    ondansetron    ondansetron    Outpatient Medications:  No current facility-administered medications on file prior to encounter       Current Outpatient Medications on File Prior to Encounter   Medication Sig Dispense Refill    allopurinol (ZYLOPRIM) 100 mg tablet Take 100 mg by mouth daily      losartan (COZAAR) 100 MG tablet losartan 100 mg tablet   take 1 tablet by mouth once daily      metFORMIN (GLUCOPHAGE) 500 mg tablet Take 1,000 mg by mouth 2 (two) times a day with meals      Metoprolol-HCTZ ER 50-12 5 MG TB24       Multiple Vitamin (multivitamin) tablet Take 1 tablet by mouth daily      pantoprazole (PROTONIX) 40 mg tablet Take 1 tablet (40 mg total) by mouth daily 30 tablet 2    simvastatin (ZOCOR) 40 mg tablet Take 40 mg by mouth daily at bedtime      aspirin 81 mg chewable tablet Chew 81 mg daily      fluticasone (FLONASE) 50 mcg/act nasal spray 2 squirts in each nostril ONCE TO TWICE DAILY (Patient not taking: Reported on 6/17/2021)      hydrochlorothiazide (HYDRODIURIL) 25 mg tablet hydrochlorothiazide 25 mg tablet   take 1 tablet by mouth once daily      HYDROcodone-acetaminophen (NORCO) 5-325 mg per tablet hydrocodone 5 mg-acetaminophen 325 mg tablet   take 1 tablet by mouth every 8 hours if needed for pain      losartan-hydrochlorothiazide (HYZAAR) 100-12 5 MG per tablet daily   0    metoprolol succinate (TOPROL-XL) 50 mg 24 hr tablet Take 50 mg by mouth every evening         Allergies   Allergen Reactions    Lisinopril Cough       Anticoagulants: ASA    ASA classification: ASA 3 - Patient with moderate systemic disease with functional limitations    Airway Assessment: III (soft and hard palate and base of uvula visible)    Relevant family history: None    Relevant review of systems: None    Prior sedation/anesthesia: yes    Can the patient lie flat?  Yes     NPO Status: yes    Labs:   CBC with diff:   Lab Results   Component Value Date    WBC 14 49 (H) 07/08/2021    HGB 10 5 (L) 07/08/2021    HCT 33 1 (L) 07/08/2021    MCV 87 07/08/2021     07/08/2021    MCH 27 6 07/08/2021    MCHC 31 7 07/08/2021    RDW 13 8 07/08/2021    MPV 11 3 07/08/2021    NRBC 0 07/08/2021     BMP/CMP:  Lab Results   Component Value Date     10/06/2015    K 4 7 07/08/2021    K 4 1 10/06/2015     07/08/2021  10/06/2015    CO2 24 07/08/2021    ANIONGAP 7 10/06/2015    BUN 34 (H) 07/08/2021    BUN 23 10/06/2015    CREATININE 1 70 (H) 07/08/2021    CREATININE 0 97 10/06/2015    GLUCOSE 217 (H) 07/08/2021    GLUCOSE 111 10/06/2015    CALCIUM 9 6 07/08/2021    CALCIUM 9 0 10/06/2015    AST 29 05/21/2021    AST 31 10/06/2015    ALT 48 05/21/2021    ALT 48 10/06/2015    ALKPHOS 65 05/21/2021    ALKPHOS 62 10/06/2015    PROT 6 8 10/06/2015    BILITOT 0 44 10/06/2015    EGFR 42 07/08/2021   ,     Coags:   Lab Results   Component Value Date    PTT 39 (H) 10/06/2015    INR 1 05 07/08/2021    INR 1 14 10/06/2015   ,   Results from last 7 days   Lab Units 07/08/21  1719 07/08/21  0831   INR  1 05 1 04        Relevant imaging studies:   Reviewed  Directed physical examination:  I agree with the physical exam performed on 7/8/21 and there are no additional changes  Assessment/Plan:   Arteriography with possible embolization    Sedation/Anesthesia plan:  MAC will be used as needed for procedure  Consent with alternatives to the procedure, risks and benefits have been explained and discussed with the patient/patient's family: yes

## 2021-07-08 NOTE — PROGRESS NOTES
Patient returns from IR at this time diaphoretic, c/o 10/10 pain in the left hip and femur area  Patient writhing in pain, is trying his best to be non compliant with bedret  Called down to IR and spoke to [employer], stated patient was sitting up on stretcher trying to relieve pain  Pt hypertensive at this time  Dr Jose A Jones aware and at bedside to evaluate  Pt remains hypertensive and diaphoretic  5mg oxy given per order  No other new orders at this time, will wait to see if oxy takes effect per Dr Jose A Jones

## 2021-07-08 NOTE — ASSESSMENT & PLAN NOTE
· Likely due to uncontrolled HTN in the setting of lymph node biopsy today  · S/p IR embolization  · Monitor overnight with serial hemoglobins  · Possible discharge tomorrow if stable

## 2021-07-08 NOTE — PROGRESS NOTES
Pt reports no relief from oxy  IR contacted at this time  Will await new orders from Dr Joslyn Wilkerson   Pt remains hypertensive

## 2021-07-08 NOTE — ANESTHESIA POSTPROCEDURE EVALUATION
Post-Op Assessment Note    CV Status:  Stable    Pain management: satisfactory to patient     Mental Status:  Alert and awake   Hydration Status:  Euvolemic   PONV Controlled:  Controlled   Airway Patency:  Patent      Post Op Vitals Reviewed: Yes      Staff: CRNA         No complications documented      BP  114/77   Temp   98 5   Pulse  92   Resp   18   SpO2   100

## 2021-07-08 NOTE — BRIEF OP NOTE (RAD/CATH)
INTERVENTIONAL RADIOLOGY PROCEDURE NOTE    Date: 7/8/2021    Procedure: IR BIOPSY LYMPH NODE    Preoperative diagnosis:   1  Lymphadenopathy, retroperitoneal         Postoperative diagnosis: Same  Surgeon: Katelyn Orlando MD     Assistant: None  No qualified resident was available  Blood loss: 5 ml    Specimens: sent to lab     Findings:   CT guided L para-aortic LN biopsy  Complications: None immediate      Anesthesia: conscious sedation

## 2021-07-08 NOTE — ASSESSMENT & PLAN NOTE
Lab Results   Component Value Date    HGBA1C 6 8 (H) 05/21/2021   · hold oral agents  · diabetic diet  · fingersticks QID with sliding scale coverage

## 2021-07-08 NOTE — BRIEF OP NOTE (RAD/CATH)
INTERVENTIONAL RADIOLOGY PROCEDURE NOTE    Date: 7/8/2021    Procedure: arteriography with embolization    Preoperative diagnosis:   1  Lymphadenopathy, retroperitoneal         Postoperative diagnosis: Same  Surgeon: Tiara Lopes MD     Assistant: None  No qualified resident was available  Blood loss: 5 ml    Specimens: none     Findings:   L2 lumbar arteriography showed active bleeding  This was coil embolized  Starclose R groin for closure  Complications: None immediate      Anesthesia: MAC sedation

## 2021-07-08 NOTE — SEDATION DOCUMENTATION
Periaortic lymph node biopsy with CT guidance performed by Dr Katelyn Orlando without complications  Tolerated well by patient, VSS throughout  IR Procedure Bedrest Start Time is 0940 x2 hours

## 2021-07-09 PROBLEM — I35.0 AORTIC VALVE STENOSIS: Status: ACTIVE | Noted: 2021-07-09

## 2021-07-09 PROBLEM — R01.1 HEART MURMUR: Status: ACTIVE | Noted: 2021-07-09

## 2021-07-09 LAB
ABO GROUP BLD: NORMAL
ANION GAP SERPL CALCULATED.3IONS-SCNC: 8 MMOL/L (ref 4–13)
BACTERIA UR QL AUTO: ABNORMAL /HPF
BASOPHILS # BLD AUTO: 0.03 THOUSANDS/ΜL (ref 0–0.1)
BASOPHILS NFR BLD AUTO: 0 % (ref 0–1)
BILIRUB UR QL STRIP: NEGATIVE
BLD GP AB SCN SERPL QL: NEGATIVE
BUN SERPL-MCNC: 33 MG/DL (ref 5–25)
CALCIUM SERPL-MCNC: 8.6 MG/DL (ref 8.3–10.1)
CHLORIDE SERPL-SCNC: 104 MMOL/L (ref 100–108)
CLARITY UR: CLEAR
CO2 SERPL-SCNC: 24 MMOL/L (ref 21–32)
COLOR UR: YELLOW
CREAT SERPL-MCNC: 1.73 MG/DL (ref 0.6–1.3)
EOSINOPHIL # BLD AUTO: 0.52 THOUSAND/ΜL (ref 0–0.61)
EOSINOPHIL NFR BLD AUTO: 5 % (ref 0–6)
ERYTHROCYTE [DISTWIDTH] IN BLOOD BY AUTOMATED COUNT: 14.2 % (ref 11.6–15.1)
GFR SERPL CREATININE-BSD FRML MDRD: 41 ML/MIN/1.73SQ M
GLUCOSE SERPL-MCNC: 119 MG/DL (ref 65–140)
GLUCOSE SERPL-MCNC: 129 MG/DL (ref 65–140)
GLUCOSE SERPL-MCNC: 133 MG/DL (ref 65–140)
GLUCOSE SERPL-MCNC: 145 MG/DL (ref 65–140)
GLUCOSE SERPL-MCNC: 156 MG/DL (ref 65–140)
GLUCOSE UR STRIP-MCNC: NEGATIVE MG/DL
HCT VFR BLD AUTO: 30.6 % (ref 36.5–49.3)
HCT VFR BLD AUTO: 30.9 % (ref 36.5–49.3)
HCT VFR BLD AUTO: 32.9 % (ref 36.5–49.3)
HGB BLD-MCNC: 10.6 G/DL (ref 12–17)
HGB BLD-MCNC: 9.7 G/DL (ref 12–17)
HGB BLD-MCNC: 9.8 G/DL (ref 12–17)
HGB UR QL STRIP.AUTO: NEGATIVE
HYALINE CASTS #/AREA URNS LPF: ABNORMAL /LPF
IMM GRANULOCYTES # BLD AUTO: 0.05 THOUSAND/UL (ref 0–0.2)
IMM GRANULOCYTES NFR BLD AUTO: 1 % (ref 0–2)
KETONES UR STRIP-MCNC: NEGATIVE MG/DL
LEUKOCYTE ESTERASE UR QL STRIP: ABNORMAL
LYMPHOCYTES # BLD AUTO: 0.92 THOUSANDS/ΜL (ref 0.6–4.47)
LYMPHOCYTES NFR BLD AUTO: 9 % (ref 14–44)
MCH RBC QN AUTO: 28.2 PG (ref 26.8–34.3)
MCHC RBC AUTO-ENTMCNC: 32 G/DL (ref 31.4–37.4)
MCV RBC AUTO: 88 FL (ref 82–98)
MONOCYTES # BLD AUTO: 0.83 THOUSAND/ΜL (ref 0.17–1.22)
MONOCYTES NFR BLD AUTO: 8 % (ref 4–12)
NEUTROPHILS # BLD AUTO: 7.71 THOUSANDS/ΜL (ref 1.85–7.62)
NEUTS SEG NFR BLD AUTO: 77 % (ref 43–75)
NITRITE UR QL STRIP: NEGATIVE
NON-SQ EPI CELLS URNS QL MICRO: ABNORMAL /HPF
NRBC BLD AUTO-RTO: 0 /100 WBCS
PH UR STRIP.AUTO: 5.5 [PH]
PLATELET # BLD AUTO: 191 THOUSANDS/UL (ref 149–390)
PMV BLD AUTO: 11 FL (ref 8.9–12.7)
POTASSIUM SERPL-SCNC: 4.5 MMOL/L (ref 3.5–5.3)
PROT UR STRIP-MCNC: ABNORMAL MG/DL
RBC # BLD AUTO: 3.48 MILLION/UL (ref 3.88–5.62)
RBC #/AREA URNS AUTO: ABNORMAL /HPF
RH BLD: POSITIVE
SCAN RESULT: NORMAL
SODIUM SERPL-SCNC: 136 MMOL/L (ref 136–145)
SP GR UR STRIP.AUTO: 1.04 (ref 1–1.03)
SPECIMEN EXPIRATION DATE: NORMAL
UROBILINOGEN UR QL STRIP.AUTO: 0.2 E.U./DL
WBC # BLD AUTO: 10.06 THOUSAND/UL (ref 4.31–10.16)
WBC #/AREA URNS AUTO: ABNORMAL /HPF

## 2021-07-09 PROCEDURE — 80048 BASIC METABOLIC PNL TOTAL CA: CPT | Performed by: INTERNAL MEDICINE

## 2021-07-09 PROCEDURE — 86901 BLOOD TYPING SEROLOGIC RH(D): CPT | Performed by: STUDENT IN AN ORGANIZED HEALTH CARE EDUCATION/TRAINING PROGRAM

## 2021-07-09 PROCEDURE — 99232 SBSQ HOSP IP/OBS MODERATE 35: CPT | Performed by: NURSE PRACTITIONER

## 2021-07-09 PROCEDURE — 81001 URINALYSIS AUTO W/SCOPE: CPT | Performed by: NURSE PRACTITIONER

## 2021-07-09 PROCEDURE — 99223 1ST HOSP IP/OBS HIGH 75: CPT | Performed by: INTERNAL MEDICINE

## 2021-07-09 PROCEDURE — 85025 COMPLETE CBC W/AUTO DIFF WBC: CPT | Performed by: INTERNAL MEDICINE

## 2021-07-09 PROCEDURE — 85014 HEMATOCRIT: CPT | Performed by: NURSE PRACTITIONER

## 2021-07-09 PROCEDURE — 86850 RBC ANTIBODY SCREEN: CPT | Performed by: STUDENT IN AN ORGANIZED HEALTH CARE EDUCATION/TRAINING PROGRAM

## 2021-07-09 PROCEDURE — 85018 HEMOGLOBIN: CPT | Performed by: NURSE PRACTITIONER

## 2021-07-09 PROCEDURE — 85018 HEMOGLOBIN: CPT | Performed by: INTERNAL MEDICINE

## 2021-07-09 PROCEDURE — 82948 REAGENT STRIP/BLOOD GLUCOSE: CPT

## 2021-07-09 PROCEDURE — 85014 HEMATOCRIT: CPT | Performed by: INTERNAL MEDICINE

## 2021-07-09 PROCEDURE — 86900 BLOOD TYPING SEROLOGIC ABO: CPT | Performed by: STUDENT IN AN ORGANIZED HEALTH CARE EDUCATION/TRAINING PROGRAM

## 2021-07-09 RX ADMIN — SODIUM CHLORIDE 100 ML/HR: 0.9 INJECTION, SOLUTION INTRAVENOUS at 19:43

## 2021-07-09 RX ADMIN — SODIUM CHLORIDE 1000 ML: 0.9 INJECTION, SOLUTION INTRAVENOUS at 10:59

## 2021-07-09 RX ADMIN — ACETAMINOPHEN 650 MG: 325 TABLET, FILM COATED ORAL at 01:14

## 2021-07-09 RX ADMIN — PANTOPRAZOLE SODIUM 40 MG: 40 TABLET, DELAYED RELEASE ORAL at 06:57

## 2021-07-09 RX ADMIN — INSULIN LISPRO 1 UNITS: 100 INJECTION, SOLUTION INTRAVENOUS; SUBCUTANEOUS at 16:30

## 2021-07-09 RX ADMIN — ASPIRIN 81 MG CHEWABLE TABLET 81 MG: 81 TABLET CHEWABLE at 10:38

## 2021-07-09 RX ADMIN — ALLOPURINOL 100 MG: 100 TABLET ORAL at 10:38

## 2021-07-09 RX ADMIN — METOPROLOL SUCCINATE 50 MG: 50 TABLET, EXTENDED RELEASE ORAL at 18:32

## 2021-07-09 RX ADMIN — ACETAMINOPHEN 650 MG: 325 TABLET, FILM COATED ORAL at 22:22

## 2021-07-09 RX ADMIN — ACETAMINOPHEN 650 MG: 325 TABLET, FILM COATED ORAL at 10:40

## 2021-07-09 RX ADMIN — PRAVASTATIN SODIUM 40 MG: 40 TABLET ORAL at 16:30

## 2021-07-09 NOTE — ASSESSMENT & PLAN NOTE
Lab Results   Component Value Date    CREATININE 1 73 (H) 07/09/2021    CREATININE 1 45 (H) 07/08/2021    CREATININE 1 70 (H) 07/08/2021   · Baseline creatinine is 1 0, currently 1 73- initially felt to be secondary to prerenal azotemia but also could be secondary to RP bleed/ABLA, ARB and HCTZ use, IV contrast   · IV hydration- NSS @100/hr   · Hold hyzaar    · Add urinary retention protocol  · Check PVR   · Consult nephrology   · Avoid nephrotoxic drugs and hypotension

## 2021-07-09 NOTE — UTILIZATION REVIEW
Initial Clinical Review    WAS OBSERVATION 07/08/2021 @ 1940, CONVERTED TO INPATIENT ADMISSION 07/09/2021 @ 1212, DUE TO CONTINUED STAY REQUIRED TO CARE FOR PATIENT WITH    Retroperitoneal hematoma / FINA,  Treatment plan below  Admission: Date/Time/Statement:   Admission Orders (From admission, onward)     Ordered        07/09/21 1212  Inpatient Admission  Once                   Orders Placed This Encounter   Procedures    Inpatient Admission     Standing Status:   Standing     Number of Occurrences:   1     Order Specific Question:   Level of Care     Answer:   Med Surg [16]     Order Specific Question:   Estimated length of stay     Answer:   More than 2 Midnights     Order Specific Question:   Certification     Answer:   I certify that inpatient services are medically necessary for this patient for a duration of greater than two midnights  See H&P and MD Progress Notes for additional information about the patient's course of treatment  Initial Presentation: 60year old male, presented to [facility] [place] [place]  Admitted as Observation due to Retroperitoneal hematoma / FINA  PMH of mesenteric adenopathy  Date: 07/08/2021     The patient was an outpatient Interventional Radiology obtaining a lymph node biopsy of a para-aortic lymph node  Postop he was hypertensive and developed abdominal pain radiating into his legs  Some have a retroperitoneal hematoma  His interventional radiologist performed an embolization  Internal Medicine has been consulted for admission  Currently the patient does report pain in the abdomen radiating down  Procedure Date:  07/08/2021  Procedure:  IR BIOPSY LYMPH NODE  Anesthesia: conscious sedation  Findings: CT guided L para-aortic LN biopsy      Procedure Date:  07/08/2021  Procedure:  arteriography with embolization  Anesthesia: MAC sedation  Findings: L2 lumbar arteriography showed active bleeding    This was coil embolized    Starclose R groin for closure  VTE Pharmacologic Prophylaxis: VTE Score: 3 Moderate Risk (Score 3-4) - Pharmacological DVT Prophylaxis Contraindicated  Sequential Compression Devices Ordered  Day 1: 07/09/2021  S/P IR embolization of L2 artery  Baseline creatinine is 1 0, currently 1 73- initially felt to be secondary to prerenal azotemia but also could be secondary to RP bleed/ABLA, ARB and HCTZ use, IV contrast   IV hydration Nss @ 100ml/h  Hold hyzaar  Consult nephrology  Monitor and trend H&H       07/09/2021  Consult Nephrology: FINA most likely secondary to prerenal azotemia plus failure to auto regulate in presence of hemodynamic perturbations plus ARB use plus ANDREI (7/8) with likely subsequent ATN  - After review of records In [facility] as well as Care everywhere it appears that the patient has a baseline Creatinine of 0 9-1 1 mg/dL  - patient was admitted with a creatinine of 1 70 mg/dL on 07/08/2021   - patient's creatinine today is at 1 73 mg/dL  - place on IV fluids 100 cc an hour for now  - check BMP in a m   - CT abdomen from 07/08/2021 does not reveal any solid renal masses no hydronephrosis no calculi  - Await renal recovery  - Optimize hemodynamic status to avoid delay in renal recovery    - Place on a renal diet when allowed diet order    - Avoid nephrotoxins, adjust meds to appropriate GFR   - Strict I/O   - Daily weights  - Urinary retention protocol if patient does not have a Villa    ED Triage Vitals   Temperature Pulse Respirations Blood Pressure SpO2   07/08/21 0825 07/08/21 0825 07/08/21 0825 07/08/21 0825 07/08/21 0825   98 2 °F (36 8 °C) 64 16 148/81 98 %      Temp Source Heart Rate Source Patient Position - Orthostatic VS BP Location FiO2 (%)   07/08/21 0825 -- 07/08/21 0825 07/08/21 0825 --   Oral  Sitting Right arm       Pain Score       07/08/21 1043       Worst Possible Pain          Wt Readings from Last 1 Encounters:   07/08/21 136 kg (300 lb)     Additional Vital Signs:   Date/Time  Temp  Pulse Resp  BP  MAP (mmHg)  SpO2  Calculated FIO2 (%) - Nasal Cannula  O2 Flow Rate (L/min)  Nasal Cannula O2 Flow Rate (L/min)  O2 Device  Patient Position - Orthostatic VS   07/09/21 15:06:52  98 2 °F (36 8 °C)  87  17  116/65  82  97 %  --  --  --  --  --   07/09/21 0300  --  --  --  --  --  --  --  --  --  None (Room air)  --   07/08/21 23:30:15  97 6 °F (36 4 °C)  94  20  120/69  86  97 %  --  --  --  --  --   07/08/21 2330  97 6 °F (36 4 °C)  94  --  120/69  86  97 %  --  --  --  --  --   07/08/21 2300  --  95  --  120/73  89  93 %  --  --  --  --  --   07/08/21 2230  --  92  --  128/80  96  95 %  --  --  --  --  --   07/08/21 2215  --  91  --  131/82  98  93 %  --  --  --  --  --   07/08/21 22:04:58  --  92  --  131/82  98  94 %  --  --  --  --  --   07/08/21 2200  --  92  --  131/82  98  97 %  --  --  --  --  --   07/08/21 2145  --  88  --  128/82  97  97 %  --  --  --  --  --   07/08/21 2130  --  88  --  129/82  98  95 %  --  --  --  --  --   07/08/21 2115  --  86  --  130/81  97  96 %  --  --  --  --  --   07/08/21 2100  --  85  --  133/81  98  96 %  --  --  --  --  --   07/08/21 2045  --  82  --  134/81  99  95 %  --  --  --  --  --   07/08/21 2030  --  85  --  135/81  99  97 %  --  --  --  --  --   07/08/21 2015  --  84  --  137/81  100  97 %  --  --  --  --  --   07/08/21 2000  --  84  --  150/86  107  97 %  --  --  --  --  --   07/08/21 19:46:39  97 3 °F (36 3 °C)Abnormal   89  19  137/87  104  95 %  --  --  --  --  --   07/08/21 1945  97 3 °F (36 3 °C)Abnormal   89  --  137/87  104  95 %  --  --  --  --  --   07/08/21 1930  97 5 °F (36 4 °C)  86  26Abnormal   118/69  94  97 %  28  --  2 L/min  Nasal cannula  --   07/08/21 1915  --  88  17  114/66  82  98 %  28  --  2 L/min  Nasal cannula  --   07/08/21 1900  --  86  18  119/74  89  94 %  28  --  2 L/min  Nasal cannula  --   07/08/21 1851  98 5 °F (36 9 °C)  92  12  108/70  --  100 %  --  6 L/min  --  Simple mask  --   07/08/21 1604  --  87  -- 86/53Abnormal   --  --  --  --  --  --  --   07/08/21 1600  --  84  --  85/51Abnormal   --  --  --  --  --  --  --   07/08/21 1545  --  88  --  90/54  --  --  --  --  --  --  --   07/08/21 1529  --  88  --  94/51  --  --  --  --  --  --  --   07/08/21 1515  --  88  --  92/63  --  --  --  --  --  --  --   07/08/21 1500  --  81  --  94/59  --  --  --  --  --  --  --   07/08/21 1445  --  --  --  97/62  --  --  --  --  --  --  --   07/08/21 1430  --  --  --  108/66  --  --  --  --  --  --  --   07/08/21 1415  --  --  --  105/67  --  --  --  --  --  --  --   07/08/21 1400  --  71  --  121/71  --  --  --  --  --  --  --   07/08/21 1345  --  76  --  124/76  --  --  --  --  --  --  --   07/08/21 1313  --  71  --  141/82   --  --  --  --  --  --  --   BP: without hyralazinebeing given at 07/08/21 1313   07/08/21 1300  --  --  --  150/84  --  --  --  --  --  --  --   07/08/21 1242  --  --  --  174/94Abnormal   --  --  --  --  --  --  --   07/08/21 1239  --  82  --  183/90Abnormal   --  --  --  --  --  --  --   07/08/21 1200  --  71  --  185/99Abnormal   --  --  --  --  --  --  --   07/08/21 1130  --  70  --  177/101Abnormal   --  --  --  --  --  --  --   07/08/21 1115  --  53Abnormal   --  193/91Abnormal   --  --  --  --  --  --  --   07/08/21 1100  --  54Abnormal   --  214/100Abnormal   --  --  --  --  --  --  --   07/08/21 1043  --  55  --  225/101Abnormal   --  --  --  --  --  --  --   07/08/21 0937  --  68  --  164/90  121  98 %  --  --  --  None (Room air)  --   07/08/21 0932  --  67  15  163/88  120  98 %  32  --  3 L/min  Nasal cannula  --   07/08/21 0927  --  70  14  159/83  115  98 %  32  --  3 L/min  Nasal cannula  --   07/08/21 0922  --  68  15  158/80  112  99 %  32  --  3 L/min  Nasal cannula  --   07/08/21 0917  --  73  16  165/88  117  94 %  --  --  --  None (Room air)  --   07/08/21 0912  --  71  15  160/91  119  97 %  --  --  --  --  --   07/08/21 0907  --  70  16  165/98  125  97 %  --  --  --  --  -- 07/08/21 0902  --  69  15  152/88  114  96 %  --  --  --  None (Room air)  --     Date and Time Eye Opening Best Verbal Response Best Motor Response Dunbar Coma Scale Score   07/09/21 0300 4 5 6 15   07/08/21 1930 4 5 6 15   07/08/21 1851 4 5 6 15     07/08/2021 @ 1901  IR embolization:  Coil embolization of an actively bleeding left lumbar L2 artery  07/09/2021 @ 0808  CTa abd/pel: Active bleeding from a left lumbar artery, with associated moderate left retroperitoneal hemorrhage  07/08/2021 @ 0944  IR Bx lymph node:  CT-guided left para-aortic lymph node biopsy       Pertinent Labs/Diagnostic Test Results:     Results from last 7 days   Lab Units 07/09/21  1244 07/09/21  0633 07/09/21  0119 07/08/21  1834 07/08/21  1747 07/08/21  1719   WBC Thousand/uL  --  10 06  --  14 49*  --  17 59*   HEMOGLOBIN g/dL 9 7* 9 8* 10 6* 10 5*  --  12 1   I STAT HEMOGLOBIN g/dl  --   --   --   --  10 9*  --    HEMATOCRIT % 30 9* 30 6* 32 9* 33 1*  --  38 0   HEMATOCRIT, ISTAT %  --   --   --   --  32*  --    PLATELETS Thousands/uL  --  191  --  168  --  217   NEUTROS ABS Thousands/µL  --  7 71*  --   --   --  15 77*     Results from last 7 days   Lab Units 07/09/21  0633 07/08/21  1834 07/08/21  1747 07/08/21  1719   SODIUM mmol/L 136 137  --  136   POTASSIUM mmol/L 4 5 4 4  --  4 7   CHLORIDE mmol/L 104 106  --  102   CO2 mmol/L 24 24  --  26   CO2, I-STAT mmol/L  --   --  24  --    ANION GAP mmol/L 8 7  --  8   BUN mg/dL 33* 32*  --  34*   CREATININE mg/dL 1 73* 1 45*  --  1 70*   EGFR ml/min/1 73sq m 41 51  --  42   CALCIUM mg/dL 8 6 8 5  --  9 6     Results from last 7 days   Lab Units 07/09/21  1054 07/09/21  0631 07/08/21  2119 07/08/21  1849   POC GLUCOSE mg/dl 145* 129 143* 173*     Results from last 7 days   Lab Units 07/09/21  0633 07/08/21  1834 07/08/21  1719   GLUCOSE RANDOM mg/dL 133 210* 246*     Results from last 7 days   Lab Units 07/08/21  1747   I STAT BASE EXC mmol/L -3*   I STAT O2 SAT % 99*   ISTAT PH ART  7 309*   I STAT ART PCO2 mm HG 45 8*   I STAT ART PO2 mm  0*   I STAT ART HCO3 mmol/L 23 0     Results from last 7 days   Lab Units 07/08/21  1834 07/08/21  1833 07/08/21  1719 07/08/21  0831   PROTIME seconds 14 4 14 3 13 7 13 6   INR  1 12  --  1 05 1 04   PTT seconds  --  28  28  --   --      Past Medical History:   Diagnosis Date    Diabetes mellitus ([place] [address] )     High cholesterol     Hypertension      Present on Admission:   Diabetes 1 5, managed as type 2 ([place] [address] )   Diabetic peripheral neuropathy ([place] [address] )   Hypertension   Obesity with body mass index 30 or greater      Admitting Diagnosis: Lymphadenopathy, retroperitoneal [R59 0]  Age/Sex: 60 y o  male  Admission Orders:  Scheduled Medications:  allopurinol, 100 mg, Oral, Daily  aspirin, 81 mg, Oral, Daily  hydrALAZINE, 10 mg, Intravenous, Once  insulin lispro, 1-6 Units, Subcutaneous, TID AC  insulin lispro, 1-6 Units, Subcutaneous, HS  metoprolol succinate, 50 mg, Oral, QPM  pantoprazole, 40 mg, Oral, Daily Before Breakfast  pravastatin, 40 mg, Oral, Daily With Dinner      Continuous IV Infusions:  sodium chloride, 100 mL/hr, Intravenous, Continuous      PRN Meds:  acetaminophen, 650 mg, Oral, Q6H PRN  HYDROcodone-acetaminophen, 1 tablet, Oral, Q4H PRN  ondansetron, 4 mg, Intravenous, Q6H PRN      Michael SCDs  IP CONSULT TO NEPHROLOGY    Network Utilization Review Department  ATTENTION: Please call with any questions or concerns to [phone] and carefully listen to the prompts so that you are directed to the right person  All voicemails are confidential   Mikey Akira all requests for admission clinical reviews, approved or denied determinations and any other requests to dedicated fax number below belonging to the campus where the patient is receiving treatment   List of dedicated fax numbers for the Facilities:  [address] [place] [address] DENIALS (Administrative/Medical Necessity) [phone]   [address] [place] [address] (Maternity/NICU/Pediatrics) [phone] [id number] [facility] [address] [place] [address] [id number] [place] [address] [address] [phone]

## 2021-07-09 NOTE — ASSESSMENT & PLAN NOTE
· Likely due to uncontrolled HTN in the setting of lymph node biopsy yesterday  · S/p IR embolization of L2 artery

## 2021-07-09 NOTE — ASSESSMENT & PLAN NOTE
Screening EGD and colonoscopy have been unremarkable  S/p lymph node biopsy  Outpatient follow up with oncology for results of today's biopsy

## 2021-07-09 NOTE — PROGRESS NOTES
Progress Note -Interventional Radiology ROSANA Munoz 60 y o  male MRN: [id number]  Unit/Bed#: CW2 211-02 Encounter: [id number]    Assessment:    60year old male with pmh of mesenteric lymphadenopathy, presented as outpatient s/p periaortic lymph node biopsy 7/8/21, subsequent bleeding noted on CTA abd/pelvis, s/p left lumbar L2 artery embolization 7/8/21    Plan:    - slight drop in hemoglobin today  Most likely dilutional due to resuscitation  Today 9 8 from 10 6  - vitals have been stable overnight  - patient ok for discharge from IR standpoint  Patient may take tylenol/ibuprofen for pain     Patient Active Problem List   Diagnosis    Diabetes 1 5, managed as type 2 ([facility] [address] [facility]    Hypertension    Hypercholesteremia    Hammer toe of right foot    Stasis dermatitis of both legs    Diabetic peripheral neuropathy ([facility] [facility]    Gout    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[facility] [address] [facility]    Obesity with body mass index 30 or greater    Type 2 diabetes mellitus (HCC)    Retroperitoneal hematoma    Mesenteric lymphadenopathy    Acute blood loss anemia    FINA (acute kidney injury) ([facility] [address] )          Subjective: Patient's pain has much improved today  He has much more mobility in his left leg  Some left hip pain continues  No pain to right groin  Hgb 9 8 from 10 6, most likely dilutional      Objective:    Vitals:  /69   Pulse 94   Temp 97 6 °F (36 4 °C)   Resp 20   Ht 6' 4" (1 93 m)   Wt 136 kg (300 lb)   SpO2 97%   BMI 36 52 kg/m²   Body mass index is 36 52 kg/m²    Weight (last 2 days)     Date/Time   Weight    07/08/21 0825   136 (300)              I/Os:    Intake/Output Summary (Last 24 hours) at 7/9/2021 0931  Last data filed at 7/8/2021 1850  Gross per 24 hour   Intake 1900 ml   Output 50 ml   Net 1850 ml       Invasive Devices     Peripheral Intravenous Line            Peripheral IV 07/08/21 Left Forearm 1 day    Peripheral IV 07/08/21 Right Hand <1 day                Physical Exam:  General appearance: alert and oriented, in no acute distress  Lungs: clear to auscultation bilaterally  Heart: regular rate and rhythm, S1, S2 normal, no murmur, click, rub or gallop  Skin: Skin color, texture, turgor normal  No rashes or lesions or left flank site c/d/i                Lab Results and Cultures:   CBC with diff:   Lab Results   Component Value Date    WBC 10 06 07/09/2021    HGB 9 8 (L) 07/09/2021    HCT 30 6 (L) 07/09/2021    MCV 88 07/09/2021     07/09/2021    MCH 28 2 07/09/2021    MCHC 32 0 07/09/2021    RDW 14 2 07/09/2021    MPV 11 0 07/09/2021    NRBC 0 07/09/2021      BMP/CMP:  Lab Results   Component Value Date     10/06/2015    K 4 5 07/09/2021    K 4 1 10/06/2015     07/09/2021     10/06/2015    CO2 24 07/09/2021    CO2 24 07/08/2021    ANIONGAP 7 10/06/2015    BUN 33 (H) 07/09/2021    BUN 23 10/06/2015    CREATININE 1 73 (H) 07/09/2021    CREATININE 0 97 10/06/2015    GLUCOSE 217 (H) 07/08/2021    GLUCOSE 111 10/06/2015    CALCIUM 8 6 07/09/2021    CALCIUM 9 0 10/06/2015    AST 29 05/21/2021    AST 31 10/06/2015    ALT 48 05/21/2021    ALT 48 10/06/2015    ALKPHOS 65 05/21/2021    ALKPHOS 62 10/06/2015    PROT 6 8 10/06/2015    BILITOT 0 44 10/06/2015    EGFR 41 07/09/2021   ,     Coags:   Lab Results   Component Value Date    PTT 28 07/08/2021    PTT 28 07/08/2021    PTT 39 (H) 10/06/2015    INR 1 12 07/08/2021    INR 1 14 10/06/2015   ,   Results from last 7 days   Lab Units 07/08/21  1834 07/08/21  1833 07/08/21  1719 07/08/21  0831   PTT seconds  --  28  28  --   --    INR  1 12  --  1 05 1 04        Lipid Panel:   Lab Results   Component Value Date    CHOL 136 10/06/2015     Lab Results   Component Value Date    HDL 29 (L) 05/21/2021     Lab Results   Component Value Date    HDL 29 (L) 05/21/2021     Lab Results   Component Value Date    LDLCALC 60 05/21/2021     Lab Results   Component Value Date    TRIG 258 (H) 05/21/2021       HgbA1c:   Lab Results   Component Value Date    HGBA1C 6 8 (H) 05/21/2021    HGBA1C 6 9 (H) 10/14/2020    HGBA1C 5 8 (H) 03/06/2020       Blood Culture: No results found for: BLOODCX,   Urinalysis:   Lab Results   Component Value Date    COLORU Yellow 05/21/2021    CLARITYU Clear 05/21/2021    SPECGRAV 1 020 05/21/2021    PHUR 7 0 05/21/2021    LEUKOCYTESUR Negative 05/21/2021    NITRITE Negative 05/21/2021    GLUCOSEU Negative 05/21/2021    KETONESU Negative 05/21/2021    BILIRUBINUR Negative 05/21/2021    BLOODU Negative 05/21/2021   ,   Urine Culture: No results found for: URINECX,   Wound Culure:    Lab Results   Component Value Date    WOUNDCULT (A) 10/28/2019     2+ Growth of Methicillin Resistant Staphylococcus aureus         Thank you for allowing me to participate in the care of [employer]  Please don't hesitate to call, text, email, or [employer] with any questions  This text is generated with voice recognition software  There may be translation, syntax,  or grammatical errors  If you have any questions, please contact the dictating provider      Marichuy Ruelas PA-C

## 2021-07-09 NOTE — ASSESSMENT & PLAN NOTE
Baseline creatinine is 1 0  Likely due to dehydration  IV hydration  Hold hyzaar today  Recheck BMP in AM

## 2021-07-09 NOTE — ASSESSMENT & PLAN NOTE
· Noted  · Per PCP notes will refer to cardiology at discharge  · ECHO 7/6/21: EF 60%, G1DD   Mitral valve moderate annular calcification

## 2021-07-09 NOTE — PLAN OF CARE
Problem: PAIN - ADULT  Goal: Verbalizes/displays adequate comfort level or baseline comfort level  Description: Interventions:  - Encourage patient to monitor pain and request assistance  - Assess pain using appropriate pain scale  - Administer analgesics based on type and severity of pain and evaluate response  - Implement non-pharmacological measures as appropriate and evaluate response  - Consider cultural and social influences on pain and pain management  - Notify physician/advanced practitioner if interventions unsuccessful or patient reports new pain  Outcome: Progressing     Problem: INFECTION - ADULT  Goal: Absence or prevention of progression during hospitalization  Description: INTERVENTIONS:  - Assess and monitor for signs and symptoms of infection  - Monitor lab/diagnostic results  - Monitor all insertion sites, i e  indwelling lines, tubes, and drains  - Monitor endotracheal if appropriate and nasal secretions for changes in amount and color  - [place] appropriate cooling/warming therapies per order  - Administer medications as ordered  - Instruct and encourage patient and family to use good hand hygiene technique  - Identify and instruct in appropriate isolation precautions for identified infection/condition  Outcome: Progressing  Goal: Absence of fever/infection during neutropenic period  Description: INTERVENTIONS:  - Monitor WBC    Outcome: Progressing     Problem: SAFETY ADULT  Goal: Patient will remain free of falls  Description: INTERVENTIONS:  - Educate patient/family on patient safety including physical limitations  - Instruct patient to call for assistance with activity   - Consult OT/PT to assist with strengthening/mobility   - Keep Call bell within reach  - Keep bed low and locked with side rails adjusted as appropriate  - Keep care items and personal belongings within reach  - Initiate and maintain comfort rounds  - Make Fall Risk Sign visible to staff  - Offer Toileting every Hours, in advance of need  - Initiate/Maintain alarm  - Obtain necessary fall risk management equipment:   - Apply yellow socks and bracelet for high fall risk patients  - Consider moving patient to room near nurses station  Outcome: Progressing  Goal: Maintain or return to baseline ADL function  Description: INTERVENTIONS:  -  Assess patient's ability to carry out ADLs; assess patient's baseline for ADL function and identify physical deficits which impact ability to perform ADLs (bathing, care of mouth/teeth, toileting, grooming, dressing, etc )  - Assess/evaluate cause of self-care deficits   - Assess range of motion  - Assess patient's mobility; develop plan if impaired  - Assess patient's need for assistive devices and provide as appropriate  - Encourage maximum independence but intervene and supervise when necessary  - Involve family in performance of ADLs  - Assess for home care needs following discharge   - Consider OT consult to assist with ADL evaluation and planning for discharge  - Provide patient education as appropriate  Outcome: Progressing  Goal: Maintains/Returns to pre admission functional level  Description: INTERVENTIONS:  - Perform BMAT or MOVE assessment daily    - Set and communicate daily mobility goal to care team and patient/family/caregiver  - Collaborate with rehabilitation services on mobility goals if consulted  - Perform Range of Motion times a day  - Reposition patient every  hours    - Dangle patient times a day  - Stand patient times a day  - Ambulate patient  times a day  - Out of bed to chair times a day   - Out of bed for meals  times a day  - Out of bed for toileting  - Record patient progress and toleration of activity level   Outcome: Progressing     Problem: Knowledge Deficit  Goal: Patient/family/caregiver demonstrates understanding of disease process, treatment plan, medications, and discharge instructions  Description: Complete learning assessment and assess knowledge base   Interventions:  - Provide teaching at level of understanding  - Provide teaching via preferred learning methods  Outcome: Progressing

## 2021-07-09 NOTE — ASSESSMENT & PLAN NOTE
With hypertensive urgency  Pt did not take his HTN meds today  restart metoprolol and hyzaar tomorrow if renal function is improved

## 2021-07-09 NOTE — CONSULTS
Consultation - Nephrology   Violet Gonzalez 60 y o  male MRN: [id number]  Unit/Bed#: 2 211-02 Encounter: [id number]    ASSESSMENT and PLAN:  Acute kidney injury (POA):  Etiology: Suspect prerenal in the setting of blood pressure perturbation, acute blood loss with retroperitoneal bleed, ANDREI with IV contrast at 130 mL 7/8/2021, in the setting of losartan and HCTZ with last dose being on 07/07/2021 leading to ATN  Assessment:   After review of medical records through [address] [place] [address] it appears that the patient has a baseline Creatinine of 0 9-1 10 dating back to 2015 with EGFR greater than [de-identified]   Patient was admitted with a creatinine of 1 70 decreased to 1 45   Patient's creatinine today is at 1 73   Acid base and lytes stable    Last dose of losartan and HCTZ was 07/07/2021   Clinically, patient is not uremic and there is no acute indication for renal replacement therapy (dialysis)  Workup:   Urinalysis with micro from 05/20/2021: + 2 protein, no blood or RBC   CT scan of abdomen and pelvis with and without contrast 7/8/2021 reports:  Does not reveal any renal solid mass, hydronephrosis or renal calculi  Plan:   Avoid nephrotoxins, adjust meds to appropriate GFR   Optimize hemodynamic status to avoid delay in renal recovery   Continue to hold losartan and HCTZ   Will check urinalysis with micro   Agree with IV fluids at 100 cc an hour   Agree with bladder scan with PVR   Suspect creatinine will continue to increase in the setting of recent contrast exposure yesterday and hopefully plateau in the next 04-48 hours   Will continue trend I/O, lab values volume status    Blood pressure/Hypertension:  Assessment and Plan:   Current blood pressure:  120/69 appears stable since last evening at 7:00 p m    Home medications include:   Losartan 100 mg daily, hydrochlorothiazide 25 mg daily, metoprolol soup in 850 mg Q evening   Current medications include:  Toprol XL 50 mg Q evening   Maximize hemodynamics to maintain MAP >65   Avoid hypotension or fluctuations in blood pressure   Will continue to trend    H&H/anemia: In the setting of  Assessment and Plan:   Current hemoglobin 9 8   Admission hemoglobin 12 1   Per primary team   Transfuse if hemoglobin less than 7 0    Acid-base/Electrolytes:  Assessment and Plan:  · Within normal limits  · Will continue to monitor and treat as needed    Other medical Issues:  Mesenteric and retroperitoneal lymphadenopathy-following Hematology/Oncology as outpatient   Concern for lymphoma versus other malignancy   Status post lymph node biopsy-complicated by retroperitoneal bleed-status post coil embolization   IR following    Elevated uric acid:  Currently on allopurinol   · Will check uric acid in a m  Diabetes:  Last A1c 6 8 on 05/21/2021      HISTORY OF PRESENT ILLNESS:  Requesting Physician: Cornelio Rhoades MD  Reason for Consult: FINA Crow is a 60 y o  male with past medical history of diabetes  II, hypertension, hyperlipidemia, stasis dermatitis, ascending thoracic aortic aneurysm, and known murmur who was found to have mesenteric adenopathy and underwent IR lymph node biopsy yesterday complicated by hypertensive/hypotensive episodes and retroperitoneal bleed requiring arterial graft the and coil embolization with active bleeding of the left lumbar L2 artery  Postprocedure blood work revealed elevated creatinine 1 7 and 1 73 this a m  And a renal consultation is requested today for assistance in the management of acute kidney injury  On discussion, the patient reports severe pain immediately post biopsy requiring oral and IV pain medicine  He denies chest pain, shortness of breath, dizziness, lightheadedness, nausea, vomiting, urinary issues, no fevers, chills  He reports chronic mild lower extremity edema right greater than left which is chronic for him    Most recently he was found to have a peritoneal lymphadenopathy which prompted IR biopsy  He denies NSAID use    Last dose of losartan and HCTZ was 07/07/2021    PAST MEDICAL HISTORY:  Past Medical History:   Diagnosis Date    Diabetes mellitus ([address] )     High cholesterol     Hypertension        PAST SURGICAL HISTORY:  Past Surgical History:   Procedure Laterality Date    BUNIONECTOMY Right 11/24/2020    Procedure: RIGHT HAV CORRECTION,;  Surgeon: Eric Hyde DPM;  [facility];  Service: Podiatry    INCISION AND DRAINAGE OF WOUND Right 10/5/2016    Procedure: INCISION AND DRAINAGE (I&D) EXTREMITY;  Surgeon: Eric Hyde DPM;  [facility];  Service:     IR BIOPSY LYMPH NODE  7/8/2021    IR EMBOLIZATION (SPECIFY VESSEL OR SITE)  7/8/2021    PILONIDAL CYST EXCISION      [place] REPAIR OF HAMMERTOE,ONE Right 2/19/2019    Procedure: THIRD HAMMER TOE CORRECTION;  Surgeon: Eric Hyde DPM;  [facility];  Service: Podiatry    TOE OSTEOTOMY Right 3/14/2017    Procedure: HAMMERTOE CORRECTION R 2 ;  Surgeon: Eric Hyde DPM;  [facility];  Service:     TOE OSTEOTOMY Left 11/24/2020    Procedure: LEFT HT CORRECTION TOE;  Surgeon: Eric Hyde DPM;  [facility];  Service: Podiatry   [address]:  Allergies   Allergen Reactions    Lisinopril Cough       SOCIAL HISTORY:  Social History     Substance and Sexual Activity   Alcohol Use No     Social History     Substance and Sexual Activity   Drug Use No     Social History     Tobacco Use   Smoking Status Never Smoker   Smokeless Tobacco Never Used   Tobacco Comment    Never smoked but exposed to second hand smoke from birth until 19's       FAMILY HISTORY:  Family History   Problem Relation Age of Onset    Cancer Father         Leukemia       MEDICATIONS:    Current Facility-Administered Medications:     acetaminophen (TYLENOL) tablet 650 mg, 650 mg, Oral, Q6H PRN, Lillian Hilario MD, 650 mg at 07/09/21 1040    allopurinol (ZYLOPRIM) tablet 100 mg, 100 mg, Oral, Daily, Bryce Mccormack MD, 100 mg at 07/09/21 1038    aspirin chewable tablet 81 mg, 81 mg, Oral, Daily, Bryce Mccormack MD, 81 mg at 07/09/21 1038    hydrALAZINE (APRESOLINE) injection 10 mg, 10 mg, Intravenous, Once, Se Leroy MD    HYDROcodone-acetaminophen [facility]) 5-325 mg per tablet 1 tablet, 1 tablet, Oral, Q4H PRN, Bryce Mccormack MD    insulin lispro (HumaLOG) 100 units/mL subcutaneous injection 1-6 Units, 1-6 Units, Subcutaneous, TID AC **AND** Fingerstick Glucose (POCT), , , TID AC, Bryce Mccormack MD    insulin lispro (HumaLOG) 100 units/mL subcutaneous injection 1-6 Units, 1-6 Units, Subcutaneous, HS, Bryce Mccormack MD    metoprolol succinate (TOPROL-XL) 24 hr tablet 50 mg, 50 mg, Oral, QPM, Bryce Mccormack MD    ondansetron [facility]) injection 4 mg, 4 mg, Intravenous, Q6H PRN, Se Leroy MD, 4 mg at 07/08/21 1237    pantoprazole (PROTONIX) EC tablet 40 mg, 40 mg, Oral, Daily Before Breakfast, Bryce Mccormack MD, 40 mg at 07/09/21 0657    pravastatin (PRAVACHOL) tablet 40 mg, 40 mg, Oral, Daily With Tiago Dickson MD    sodium chloride 0 9 % infusion, 100 mL/hr, Intravenous, Continuous, TANVIR Ramsey, Last Rate: 100 mL/hr at 07/09/21 1110, 100 mL/hr at 07/09/21 1110    REVIEW OF SYSTEMS:  A complete 10 point review of systems was performed and found to be negative unless otherwise noted below or in the HPI  General: No fevers, chills, weakness  Cardiovascular:  Denies chest pain, palpitations, chronic trace edema record left  Respiratory:  Denies cough, sputum production, shortness of breath  Gastrointestinal:  Denies nausea, vomiting, abdominal pain, diarrhea or constipation    Genitourinary: No dysuria, burning, hematuria or increased frequency or difficulty with stream     PHYSICAL EXAM:  Current Weight: Weight - Scale: 136 kg (300 lb)  First Weight: Weight - Scale: 136 kg (300 lb)  Vitals:    07/08/21 2230 07/08/21 2300 07/08/21 2330 07/08/21 2330   BP: 128/80 120/73 120/69 120/69   BP Location:       Pulse: 92 95 94 94   Resp:    20   Temp:   97 6 °F (36 4 °C) 97 6 °F (36 4 °C)   TempSrc:       SpO2: 95% 93% 97% 97%   Weight:       Height:           Intake/Output Summary (Last 24 hours) at 7/9/2021 1146  Last data filed at 7/8/2021 1850  Gross per 24 hour   Intake 1900 ml   Output 50 ml   Net 1850 ml     General: conscious, coherent, cooperative, not in acute distress, OOB  Skin: no rash, warm and dry  Eyes: pale conjunctivae, anicteric sclerae  ENT: moist lips and mucous membranes  Neck: supple, no JVD noted  Chest: clear breath sounds, crackles, rhonchi, wheezes  CVS: distinct S1 & S2, normal rate, regular rhythm, no rub, + murmur  Abdomen: soft, non-tender, non-distended, active bowel sounds  Extremities:  Trace lower extremity edema right greater than left-chronic    PVD discoloration bilaterally  Neuro: awake, alert, oriented  Psych: appropriate affect       Invasive Devices:     Lab Results:   Results from last 7 days   Lab Units 07/09/21  0633 07/09/21  0119 07/08/21  1834 07/08/21  1747 07/08/21  1719   WBC Thousand/uL 10 06  --  14 49*  --  17 59*   HEMOGLOBIN g/dL 9 8* 10 6* 10 5*  --  12 1   I STAT HEMOGLOBIN g/dl  --   --   --  10 9*  --    HEMATOCRIT % 30 6* 32 9* 33 1*  --  38 0   HEMATOCRIT, ISTAT %  --   --   --  32*  --    PLATELETS Thousands/uL 191  --  168  --  217   SODIUM mmol/L 136  --  137  --  136   POTASSIUM mmol/L 4 5  --  4 4  --  4 7   CHLORIDE mmol/L 104  --  106  --  102   CO2 mmol/L 24  --  24  --  26   CO2, I-STAT mmol/L  --   --   --  24  --    BUN mg/dL 33*  --  32*  --  34*   CREATININE mg/dL 1 73*  --  1 45*  --  1 70*   CALCIUM mg/dL 8 6  --  8 5  --  9 6   GLUCOSE, ISTAT mg/dl  --   --   --  217*  --

## 2021-07-09 NOTE — CASE MANAGEMENT
LOS 1  CM continues to follow this pt; No CM needs identified  Upon review of chart, no concerns with discharge disposition noted  CM available as needed  CM will continue to follow and plan for discharge accordingly

## 2021-07-09 NOTE — ASSESSMENT & PLAN NOTE
Due to RP hematoma   Lab Results   Component Value Date    HGB 9 8 (L) 07/09/2021    HGB 10 6 (L) 07/09/2021    HGB 10 5 (L) 07/08/2021    HGB 10 9 (L) 07/08/2021   hgb slightly dropped, likely hemodilution   S/p embolization by IR of bleeding L2 artery yesterday

## 2021-07-09 NOTE — PROGRESS NOTES
[address] [facility]  Progress Note - Piotr Vincent 1955, 60 y o  male MRN: [id number]  Unit/Bed#: CW2 211-02 Encounter: [id number]  Primary Care Provider: Deondre Molina MD   Date and time admitted to hospital: 7/8/2021  8:16 AM    * Retroperitoneal hematoma  Assessment & Plan  · Likely due to uncontrolled HTN in the setting of lymph node biopsy yesterday  · S/p IR embolization of L2 artery    FINA (acute kidney injury) [facility])  Assessment & Plan  Lab Results   Component Value Date    CREATININE 1 73 (H) 07/09/2021    CREATININE 1 45 (H) 07/08/2021    CREATININE 1 70 (H) 07/08/2021   · Baseline creatinine is 1 0, currently 1 73- initially felt to be secondary to prerenal azotemia but also could be secondary to RP bleed/ABLA, ARB and HCTZ use, IV contrast   · IV hydration- NSS @100/hr   · Hold hyzaar    · Add urinary retention protocol  · Check PVR   · Consult nephrology   · Avoid nephrotoxic drugs and hypotension     Acute blood loss anemia  Assessment & Plan  Due to RP hematoma   Lab Results   Component Value Date    HGB 9 8 (L) 07/09/2021    HGB 10 6 (L) 07/09/2021    HGB 10 5 (L) 07/08/2021    HGB 10 9 (L) 07/08/2021   hgb slightly dropped, likely hemodilution   S/p embolization by IR of bleeding L2 artery yesterday    Heart murmur  Assessment & Plan  · Noted  · Per PCP notes will refer to cardiology at discharge  · ECHO 7/6/21: EF 60%, G1DD   Mitral valve moderate annular calcification    Mesenteric lymphadenopathy  Assessment & Plan  · Screening EGD and colonoscopy have been unremarkable  · S/p lymph node biopsy  · Outpatient follow up with oncology for results of biopsy    Obesity with body mass index 30 or greater  Assessment & Plan  BMI 36  Encourage diet and lifestyle modifications     Hypertension  Assessment & Plan  · Presented with hypertensive urgency likely as a result of not taking BP meds- improved today  · restart metoprolol  · C/w holding hyzaar given FINA  · Avoid hypotension given FINA       Diabetes 1 5, managed as type 2 [facility])  Assessment & Plan    Lab Results   Component Value Date    HGBA1C 6 8 (H) 2021   · hold oral agents  · diabetic diet  · fingersticks QID with sliding scale coverage      VTE Pharmacologic Prophylaxis: VTE Score: 3 Moderate Risk (Score 3-4) - Pharmacological DVT Prophylaxis Contraindicated  Sequential Compression Devices Ordered  Patient Centered Rounds: I performed bedside rounds with nursing staff today  Discussions with Specialists or Other Care Team Provider: d/w RN  D/w nephrology  Education and Discussions with Family / Patient: Patient declined call to   Time Spent for Care: 30 minutes  More than 50% of total time spent on counseling and coordination of care as described above  Current Length of Stay: 0 day(s)  Current Patient Status: Outpatient Procedure   Certification Statement: The patient, admitted on an observation basis, will now require > 2 midnight hospital stay due to FINA   Discharge Plan: Anticipate discharge in 24-48 hrs to home  Code Status: Level 1 - Full Code    Subjective:   Pt reports he has some left flank discomfort, worse with movement and better with rest  With movement pain is an 8/10  Denies any other complaints  Reports he is emptying his bladder, denies hematuria  Objective:     Vitals:   Temp (24hrs), Av 6 °F (36 4 °C), Min:97 3 °F (36 3 °C), Max:98 5 °F (36 9 °C)    Temp:  [97 3 °F (36 3 °C)-98 5 °F (36 9 °C)] 97 6 °F (36 4 °C)  HR:  [71-95] 94  Resp:  [12-26] 20  BP: ()/(51-94) 120/69  SpO2:  [93 %-100 %] 97 %  Body mass index is 36 52 kg/m²  Input and Output Summary (last 24 hours): Intake/Output Summary (Last 24 hours) at 2021 1211  Last data filed at 2021 1850  Gross per 24 hour   Intake 1900 ml   Output 50 ml   Net 1850 ml       Physical Exam:   Physical Exam  Constitutional:       General: He is not in acute distress       Appearance: He is obese    Cardiovascular:      Rate and Rhythm: Normal rate and regular rhythm  Pulses: Normal pulses  Heart sounds: Murmur heard  Pulmonary:      Effort: No respiratory distress  Breath sounds: Normal breath sounds  No wheezing or rales  Abdominal:      General: Bowel sounds are normal  There is no distension  Palpations: Abdomen is soft  Tenderness: There is no abdominal tenderness  Musculoskeletal:         General: Tenderness (left flank) present  No swelling  Skin:     General: Skin is warm and dry  Findings: No erythema or rash  Neurological:      General: No focal deficit present  Mental Status: He is alert  Mental status is at baseline     Psychiatric:         Attention and Perception: Attention normal          Mood and Affect: Mood normal           Additional Data:     Labs:  Results from last 7 days   Lab Units 07/09/21  0633   WBC Thousand/uL 10 06   HEMOGLOBIN g/dL 9 8*   HEMATOCRIT % 30 6*   PLATELETS Thousands/uL 191   NEUTROS PCT % 77*   LYMPHS PCT % 9*   MONOS PCT % 8   EOS PCT % 5     Results from last 7 days   Lab Units 07/09/21  0633   SODIUM mmol/L 136   POTASSIUM mmol/L 4 5   CHLORIDE mmol/L 104   CO2 mmol/L 24   BUN mg/dL 33*   CREATININE mg/dL 1 73*   ANION GAP mmol/L 8   CALCIUM mg/dL 8 6   GLUCOSE RANDOM mg/dL 133     Results from last 7 days   Lab Units 07/08/21  1834   INR  1 12     Results from last 7 days   Lab Units 07/09/21  1054 07/09/21  0631 07/08/21  2119 07/08/21  1849   POC GLUCOSE mg/dl 145* 129 143* 173*               Lines/Drains:  Invasive Devices     Peripheral Intravenous Line            Peripheral IV 07/08/21 Left Forearm 1 day    Peripheral IV 07/08/21 Right Hand <1 day                      Imaging: Reviewed radiology reports from this admission including: abdominal/pelvic CT    Recent Cultures (last 7 days):         Last 24 Hours Medication List:   Current Facility-Administered Medications   Medication Dose Route Frequency Provider Last Rate    acetaminophen  650 mg Oral Q6H PRN Lesvia Parry MD      allopurinol  100 mg Oral Daily Lesvia Parry MD      aspirin  81 mg Oral Daily Lesvia Parry MD      hydrALAZINE  10 mg Intravenous Once Vernel Nissen, MD      HYDROcodone-acetaminophen  1 tablet Oral Q4H PRN Lesvia Parry MD      insulin lispro  1-6 Units Subcutaneous TID AC Lesvia Parry MD      insulin lispro  1-6 Units Subcutaneous HS Lesvia Parry MD      metoprolol succinate  50 mg Oral QPM Lesvia Parry MD      ondansetron  4 mg Intravenous Q6H PRN Vernel Nissen, MD      pantoprazole  40 mg Oral Daily Before Breakfast Lesvia Parry MD      pravastatin  40 mg Oral Daily With Susan Nunez MD      sodium chloride  100 mL/hr Intravenous Continuous TANVIR Andino 100 mL/hr (07/09/21 1110)        Today, Patient Was Seen By: TANVIR Andino    **Please Note: This note may have been constructed using a voice recognition system  **

## 2021-07-09 NOTE — ASSESSMENT & PLAN NOTE
· Presented with hypertensive urgency likely as a result of not taking BP meds- improved today  · restart metoprolol  · C/w holding hyzaar given FINA  · Avoid hypotension given FINA

## 2021-07-09 NOTE — ASSESSMENT & PLAN NOTE
· Screening EGD and colonoscopy have been unremarkable  · S/p lymph node biopsy  · Outpatient follow up with oncology for results of biopsy

## 2021-07-09 NOTE — H&P
[address] [facility]  H&P- Kika Shear 1955, 60 y o  male MRN: [id number]  Unit/Bed#: 2 211-02 Encounter: [id number]  Primary Care Provider: Heydi Mccord MD   Date and time admitted to hospital: 7/8/2021  8:16 AM    * Retroperitoneal hematoma  Assessment & Plan  · Likely due to uncontrolled HTN in the setting of lymph node biopsy today  · S/p IR embolization  · Monitor overnight with serial hemoglobins  · Possible discharge tomorrow if stable    Mesenteric lymphadenopathy  Assessment & Plan  Screening EGD and colonoscopy have been unremarkable  S/p lymph node biopsy  Outpatient follow up with oncology for results of today's biopsy    Acute blood loss anemia  Assessment & Plan  Due to RP hematoma as mentioned above  Monitor hb q6h  No indication to transfuse right now    Hypertension  Assessment & Plan  With hypertensive urgency  Pt did not take his HTN meds today  restart metoprolol and hyzaar tomorrow if renal function is improved      Diabetes 1 5, managed as type 2 [facility])  Assessment & Plan    Lab Results   Component Value Date    HGBA1C 6 8 (H) 05/21/2021   · hold oral agents  · diabetic diet  · fingersticks QID with sliding scale coverage    FINA (acute kidney injury) ([facility] [address] )  Assessment & Plan  Baseline creatinine is 1 0  Likely due to dehydration  IV hydration  Hold hyzaar today  Recheck BMP in AM      VTE Pharmacologic Prophylaxis: VTE Score: 3 Moderate Risk (Score 3-4) - Pharmacological DVT Prophylaxis Contraindicated  Sequential Compression Devices Ordered  Code Status: Level 1 - Full Code full  Discussion with family: Updated  (wife and daughter) at bedside  Anticipated Length of Stay: Patient will be admitted on an observation basis with an anticipated length of stay of less than 2 midnights secondary to RP hematoma      Total Time for Visit, including Counseling / Coordination of Care: 45 minutes Greater than 50% of this total time spent on direct patient counseling and coordination of care  Chief Complaint: abdominal pain    History of Present Illness:  Alban Niño is a 60 y o  male with a PMH of mesenteric adenopathy who presents with abdominal pain  The patient was an outpatient Interventional Radiology obtaining a lymph node biopsy of a para-aortic lymph node  Postop he was hypertensive and developed abdominal pain radiating into his legs  Some have a retroperitoneal hematoma  His interventional radiologist performed an embolization  Internal Medicine has been consulted for admission  Currently the patient does report pain in the abdomen radiating down  Denies any numbness, tingling, possible lower extremity  Review of Systems:  Review of Systems   All other systems reviewed and are negative  Past Medical and Surgical History:   Past Medical History:   Diagnosis Date    Diabetes mellitus ([facility] [address] )     High cholesterol     Hypertension        Past Surgical History:   Procedure Laterality Date    BUNIONECTOMY Right 11/24/2020    Procedure: RIGHT HAV CORRECTION,;  Surgeon: Manish Villaseñor DPM;  [facility];  Service: Podiatry    INCISION AND DRAINAGE OF WOUND Right 10/5/2016    Procedure: INCISION AND DRAINAGE (I&D) EXTREMITY;  Surgeon: Manish Villaseñor DPM;  [facility];  Service:     PILONIDAL CYST EXCISION      LA REPAIR OF HAMMERTOE,ONE Right 2/19/2019    Procedure: THIRD HAMMER TOE CORRECTION;  Surgeon: Manish Villaseñor DPM;  [facility];  Service: Podiatry    TOE OSTEOTOMY Right 3/14/2017    Procedure: HAMMERTOE CORRECTION R 2 ;  Surgeon: Manish Villaseñor DPM;  [facility];  Service:     TOE OSTEOTOMY Left 11/24/2020    Procedure: LEFT HT CORRECTION TOE;  Surgeon: Manish Villaseñor DPM;  Location: [facility];  Service: Podiatry   [address]        Meds/Allergies:  Prior to Admission medications    Medication Sig Start Date End Date Taking?  Authorizing Provider   allopurinol (ZYLOPRIM) 100 mg tablet Take 100 mg by mouth daily   Yes Historical Provider, MD   losartan (COZAAR) 100 MG tablet losartan 100 mg tablet   take 1 tablet by mouth once daily   Yes Historical Provider, MD   metFORMIN (GLUCOPHAGE) 500 mg tablet Take 1,000 mg by mouth 2 (two) times a day with meals   Yes Historical Provider, MD   Metoprolol-HCTZ ER 50-12 5 MG TB24  5/25/21  Yes Historical Provider, MD   Multiple Vitamin (multivitamin) tablet Take 1 tablet by mouth daily   Yes Historical Provider, MD   pantoprazole (PROTONIX) 40 mg tablet Take 1 tablet (40 mg total) by mouth daily 6/28/21  Yes Juan Welsh MD   simvastatin (ZOCOR) 40 mg tablet Take 40 mg by mouth daily at bedtime   Yes Historical Provider, MD   aspirin 81 mg chewable tablet Chew 81 mg daily    Historical Provider, MD   fluticasone (FLONASE) 50 mcg/act nasal spray 2 squirts in each nostril ONCE TO TWICE DAILY  Patient not taking: Reported on 6/17/2021 5/20/21   Historical Provider, MD   hydrochlorothiazide (HYDRODIURIL) 25 mg tablet hydrochlorothiazide 25 mg tablet   take 1 tablet by mouth once daily    Historical Provider, MD   HYDROcodone-acetaminophen (NORCO) 5-325 mg per tablet hydrocodone 5 mg-acetaminophen 325 mg tablet   take 1 tablet by mouth every 8 hours if needed for pain    Historical Provider, MD   losartan-hydrochlorothiazide (HYZAAR) 100-12 5 MG per tablet daily  9/13/19   Historical Provider, MD   metoprolol succinate (TOPROL-XL) 50 mg 24 hr tablet Take 50 mg by mouth every evening 5/25/21   Historical Provider, MD     I have reviewed home medications with patient personally  Allergies:    Allergies   Allergen Reactions    Lisinopril Cough       Social History:  Marital Status: /Civil Union   Occupation: restaurant  Patient Pre-hospital Living Situation: Home  Patient Pre-hospital Level of Mobility: walks  Patient Pre-hospital Diet Restrictions: diabetic  Substance Use History:   Social History     Substance and Sexual Activity   Alcohol Use No     Social History     Tobacco Use   Smoking Status Never Smoker   Smokeless Tobacco Never Used   Tobacco Comment    Never smoked but exposed to second hand smoke from birth until 19's     Social History     Substance and Sexual Activity   Drug Use No       Family History:  Family History   Problem Relation Age of Onset    Cancer Father         Leukemia       Physical Exam:     Vitals:   Blood Pressure: 137/87 (07/08/21 1946)  Pulse: 89 (07/08/21 1946)  Temperature: (!) 97 3 °F (36 3 °C) (07/08/21 1946)  Temp Source: Oral (07/08/21 0825)  Respirations: 19 (07/08/21 1946)  Height: 6' 4" (193 cm) (07/08/21 0825)  Weight - Scale: 136 kg (300 lb) (07/08/21 0825)  SpO2: 95 % (07/08/21 1946)    Physical Exam  Constitutional:       Appearance: Normal appearance  He is obese  HENT:      Head: Normocephalic and atraumatic  Nose: Nose normal       Mouth/Throat:      Mouth: Mucous membranes are moist       Pharynx: Oropharynx is clear  Eyes:      Extraocular Movements: Extraocular movements intact  Cardiovascular:      Rate and Rhythm: Normal rate and regular rhythm  Pulmonary:      Effort: Pulmonary effort is normal    Abdominal:      General: There is distension  Palpations: Abdomen is soft  There is no mass  Tenderness: There is abdominal tenderness  There is no guarding or rebound  Hernia: No hernia is present  Skin:     General: Skin is warm and dry  Neurological:      General: No focal deficit present  Mental Status: He is alert and oriented to person, place, and time     Psychiatric:         Mood and Affect: Mood normal          Behavior: Behavior normal           Additional Data:     Lab Results:  Results from last 7 days   Lab Units 07/08/21  1834 07/08/21  1719   WBC Thousand/uL 14 49* 17 59*   HEMOGLOBIN g/dL 10 5* 12 1   HEMATOCRIT % 33 1* 38 0   PLATELETS Thousands/uL 168 217   NEUTROS PCT %  --  89*   LYMPHS PCT %  --  3*   MONOS PCT %  --  6   EOS PCT %  --  1 Results from last 7 days   Lab Units 07/08/21  1834   SODIUM mmol/L 137   POTASSIUM mmol/L 4 4   CHLORIDE mmol/L 106   CO2 mmol/L 24   BUN mg/dL 32*   CREATININE mg/dL 1 45*   ANION GAP mmol/L 7   CALCIUM mg/dL 8 5   GLUCOSE RANDOM mg/dL 210*     Results from last 7 days   Lab Units 07/08/21  1834   INR  1 12     Results from last 7 days   Lab Units 07/08/21  1849   POC GLUCOSE mg/dl 173*               Imaging: Reviewed radiology reports from this admission including: abdominal/pelvic CT and Personally reviewed the following imaging: CTA abdomen  IR biopsy lymph node    (Results Pending)   CTA abdomen pelvis w wo contrast    (Results Pending)   IR embolization (specify vessel or site)    (Results Pending)       EKG and Other Studies Reviewed on Admission:   · EKG: No EKG obtained  ** Please Note: This note has been constructed using a voice recognition system   **

## 2021-07-10 ENCOUNTER — APPOINTMENT (INPATIENT)
Dept: RADIOLOGY | Facility: HOSPITAL | Age: 66
DRG: 907 | End: 2021-07-10
Payer: COMMERCIAL

## 2021-07-10 LAB
ANION GAP SERPL CALCULATED.3IONS-SCNC: 6 MMOL/L (ref 4–13)
BUN SERPL-MCNC: 32 MG/DL (ref 5–25)
CALCIUM SERPL-MCNC: 9.1 MG/DL (ref 8.3–10.1)
CHLORIDE SERPL-SCNC: 105 MMOL/L (ref 100–108)
CO2 SERPL-SCNC: 25 MMOL/L (ref 21–32)
CREAT SERPL-MCNC: 1.31 MG/DL (ref 0.6–1.3)
ERYTHROCYTE [DISTWIDTH] IN BLOOD BY AUTOMATED COUNT: 14.2 % (ref 11.6–15.1)
GFR SERPL CREATININE-BSD FRML MDRD: 57 ML/MIN/1.73SQ M
GLUCOSE SERPL-MCNC: 122 MG/DL (ref 65–140)
GLUCOSE SERPL-MCNC: 122 MG/DL (ref 65–140)
GLUCOSE SERPL-MCNC: 123 MG/DL (ref 65–140)
GLUCOSE SERPL-MCNC: 143 MG/DL (ref 65–140)
HCT VFR BLD AUTO: 28.1 % (ref 36.5–49.3)
HGB BLD-MCNC: 9.1 G/DL (ref 12–17)
MAGNESIUM SERPL-MCNC: 2.1 MG/DL (ref 1.6–2.6)
MCH RBC QN AUTO: 28.3 PG (ref 26.8–34.3)
MCHC RBC AUTO-ENTMCNC: 32.4 G/DL (ref 31.4–37.4)
MCV RBC AUTO: 88 FL (ref 82–98)
PHOSPHATE SERPL-MCNC: 2.9 MG/DL (ref 2.3–4.1)
PLATELET # BLD AUTO: 157 THOUSANDS/UL (ref 149–390)
PMV BLD AUTO: 10.4 FL (ref 8.9–12.7)
POTASSIUM SERPL-SCNC: 4.1 MMOL/L (ref 3.5–5.3)
RBC # BLD AUTO: 3.21 MILLION/UL (ref 3.88–5.62)
SODIUM SERPL-SCNC: 136 MMOL/L (ref 136–145)
URATE SERPL-MCNC: 9.3 MG/DL (ref 4.2–8)
WBC # BLD AUTO: 8.99 THOUSAND/UL (ref 4.31–10.16)

## 2021-07-10 PROCEDURE — 84550 ASSAY OF BLOOD/URIC ACID: CPT | Performed by: STUDENT IN AN ORGANIZED HEALTH CARE EDUCATION/TRAINING PROGRAM

## 2021-07-10 PROCEDURE — 82948 REAGENT STRIP/BLOOD GLUCOSE: CPT

## 2021-07-10 PROCEDURE — 80048 BASIC METABOLIC PNL TOTAL CA: CPT | Performed by: STUDENT IN AN ORGANIZED HEALTH CARE EDUCATION/TRAINING PROGRAM

## 2021-07-10 PROCEDURE — 84100 ASSAY OF PHOSPHORUS: CPT | Performed by: STUDENT IN AN ORGANIZED HEALTH CARE EDUCATION/TRAINING PROGRAM

## 2021-07-10 PROCEDURE — 83735 ASSAY OF MAGNESIUM: CPT | Performed by: STUDENT IN AN ORGANIZED HEALTH CARE EDUCATION/TRAINING PROGRAM

## 2021-07-10 PROCEDURE — 85027 COMPLETE CBC AUTOMATED: CPT | Performed by: STUDENT IN AN ORGANIZED HEALTH CARE EDUCATION/TRAINING PROGRAM

## 2021-07-10 PROCEDURE — 99232 SBSQ HOSP IP/OBS MODERATE 35: CPT | Performed by: INTERNAL MEDICINE

## 2021-07-10 PROCEDURE — G1004 CDSM NDSC: HCPCS

## 2021-07-10 PROCEDURE — 74176 CT ABD & PELVIS W/O CONTRAST: CPT

## 2021-07-10 RX ORDER — ALLOPURINOL 100 MG/1
200 TABLET ORAL DAILY
Status: DISCONTINUED | OUTPATIENT
Start: 2021-07-11 | End: 2021-07-12 | Stop reason: HOSPADM

## 2021-07-10 RX ORDER — CYCLOBENZAPRINE HCL 10 MG
5 TABLET ORAL 3 TIMES DAILY PRN
Status: DISCONTINUED | OUTPATIENT
Start: 2021-07-10 | End: 2021-07-12 | Stop reason: HOSPADM

## 2021-07-10 RX ORDER — HYDROMORPHONE HCL/PF 1 MG/ML
0.5 SYRINGE (ML) INJECTION ONCE
Status: COMPLETED | OUTPATIENT
Start: 2021-07-10 | End: 2021-07-10

## 2021-07-10 RX ORDER — DOCUSATE SODIUM 100 MG/1
100 CAPSULE, LIQUID FILLED ORAL 2 TIMES DAILY
Status: DISCONTINUED | OUTPATIENT
Start: 2021-07-10 | End: 2021-07-12 | Stop reason: HOSPADM

## 2021-07-10 RX ADMIN — HYDROCODONE BITARTRATE AND ACETAMINOPHEN 1 TABLET: 5; 325 TABLET ORAL at 01:38

## 2021-07-10 RX ADMIN — METOPROLOL SUCCINATE 50 MG: 50 TABLET, EXTENDED RELEASE ORAL at 17:03

## 2021-07-10 RX ADMIN — DOCUSATE SODIUM 100 MG: 100 CAPSULE, LIQUID FILLED ORAL at 17:02

## 2021-07-10 RX ADMIN — ACETAMINOPHEN 650 MG: 325 TABLET, FILM COATED ORAL at 14:41

## 2021-07-10 RX ADMIN — CYCLOBENZAPRINE HYDROCHLORIDE 5 MG: 10 TABLET, FILM COATED ORAL at 06:54

## 2021-07-10 RX ADMIN — PANTOPRAZOLE SODIUM 40 MG: 40 TABLET, DELAYED RELEASE ORAL at 06:37

## 2021-07-10 RX ADMIN — PRAVASTATIN SODIUM 40 MG: 40 TABLET ORAL at 17:02

## 2021-07-10 RX ADMIN — ALLOPURINOL 100 MG: 100 TABLET ORAL at 09:04

## 2021-07-10 RX ADMIN — HYDROCODONE BITARTRATE AND ACETAMINOPHEN 1 TABLET: 5; 325 TABLET ORAL at 22:22

## 2021-07-10 RX ADMIN — CYCLOBENZAPRINE HYDROCHLORIDE 5 MG: 10 TABLET, FILM COATED ORAL at 22:22

## 2021-07-10 RX ADMIN — HYDROCODONE BITARTRATE AND ACETAMINOPHEN 1 TABLET: 5; 325 TABLET ORAL at 06:54

## 2021-07-10 RX ADMIN — HYDROMORPHONE HYDROCHLORIDE 0.5 MG: 1 INJECTION, SOLUTION INTRAMUSCULAR; INTRAVENOUS; SUBCUTANEOUS at 02:27

## 2021-07-10 RX ADMIN — CYCLOBENZAPRINE HYDROCHLORIDE 5 MG: 10 TABLET, FILM COATED ORAL at 14:41

## 2021-07-10 RX ADMIN — ONDANSETRON 4 MG: 2 INJECTION INTRAMUSCULAR; INTRAVENOUS at 02:28

## 2021-07-10 RX ADMIN — HYDROMORPHONE HYDROCHLORIDE 0.5 MG: 1 INJECTION, SOLUTION INTRAMUSCULAR; INTRAVENOUS; SUBCUTANEOUS at 04:22

## 2021-07-10 RX ADMIN — ASPIRIN 81 MG CHEWABLE TABLET 81 MG: 81 TABLET CHEWABLE at 09:04

## 2021-07-10 NOTE — ASSESSMENT & PLAN NOTE
· Likely due to uncontrolled HTN in the setting of lymph node biopsy yesterday  · S/p IR embolization of L2 artery  · Repeat CT abdomen was ordered due to recurrence of severe pain last night  Needed 2 doses of dilaudid and 1 dose of flexeril  CT abdomen reported as stable size hematoma   Pain is minimal today am   · Plan to monitor over 24 hours - repeat Hb and Hct in am

## 2021-07-10 NOTE — ASSESSMENT & PLAN NOTE
· Presented with hypertensive urgency likely as a result of not taking BP meds- improved today  · restarted metoprolol  · C/w holding hyzaar given FINA  · Avoid hypotension given FINA   · bp controlled

## 2021-07-10 NOTE — UTILIZATION REVIEW
Continued Stay Review    Date: 7/10/2021                        Current Patient Class: inpatient  Current Level of Care: Med-Surg  HPI:61 y o  male initially admitted on 7/8 OBS upgraded to inpatient 7/9 with retroperitoneal bleed likely d/t uncontrolled HTN in the setting of lymph node biopsy, S/p IR embolization of L2 artery  FINA  Assessment/Plan: Repeat CT abdomen was ordered due to recurrence of severe pain last night  Needed 2 doses of dilaudid and 1 dose of flexeril  CT abdomen reported as stable size hematoma  Pain is minimal today am  Plan to monitor over 24 hrs, repeat H&H in AM  Nephrology following,  IVF's d/c'd  Continue to hold hyzaar  Voiding well, no urinary retention  Allopurinol dose increased  Repeat BMP in AM  Cr coming down  Vital Signs:   Time  Temp  Pulse  Resp  BP  MAP (mmHg)  SpO2  Calculated FIO2 (%) - Nasal Cannula  O2 Flow Rate (L/min)  Nasal Cannula O2 Flow Rate (L/min)  O2 Device   07/10/21 16:10:17  98 5 °F (36 9 °C)  84  18  110/67  81  94 %  --  --  --  --   07/10/21 06:10:58  98 6 °F (37 °C)  79  18  120/69  86  95 %  --  --  --  --   07/10/21 05:06:45  --  85  --  127/71  90  96 %  --  --  --  --   07/10/21 03:07:51  --  85  --  154/79  104  92 %  --  --  --  --   07/10/21 0230  --  81  --  165/142Abnormal   150  95 %  --  --  --  --   07/09/21 23:04:53  98 8 °F (37 1 °C)  81  20  114/65  81  94 %  --  --  --  --   07/09/21 18:32:37  --  86  --  116/65  82  95 %  --  --  --  --   07/09/21 15:06:52  98 2 °F (36 8 °C)  87  17  116/65  82  97 %  --  --  --  --   07/09/21 0900  --  --  --  --  --  --  --  --  --  None (Room air)   07/09/21 0300  --  --  --  --  --  --  --  --  --  None (Room air)         Pertinent Labs/Diagnostic Results:      CT a/p 7/10 --   1   Redemonstration of large left retroperitoneal hematoma involving the iliopsoas muscle with no interval increase in size   No new hemorrhage identified  2   Stable retroperitoneal and pelvic lymphadenopathy     3   Small left pleural effusion with basilar atelectasis      Results from last 7 days   Lab Units 07/10/21  0216 07/09/21  1244 07/09/21  0633 07/09/21  0119 07/08/21  1834 07/08/21  1719   WBC Thousand/uL 8 99  --  10 06  --  14 49* 17 59*   HEMOGLOBIN g/dL 9 1* 9 7* 9 8* 10 6* 10 5* 12 1   HEMATOCRIT % 28 1* 30 9* 30 6* 32 9* 33 1* 38 0   PLATELETS Thousands/uL 157  --  191  --  168 217   NEUTROS ABS Thousands/µL  --   --  7 71*  --   --  15 77*     Results from last 7 days   Lab Units 07/10/21  0217 07/09/21  0633 07/08/21  1834 07/08/21  1747 07/08/21  1719   SODIUM mmol/L 136 136 137  --  136   POTASSIUM mmol/L 4 1 4 5 4 4  --  4 7   CHLORIDE mmol/L 105 104 106  --  102   CO2 mmol/L 25 24 24  --  26   CO2, I-STAT mmol/L  --   --   --  24  --    ANION GAP mmol/L 6 8 7  --  8   BUN mg/dL 32* 33* 32*  --  34*   CREATININE mg/dL 1 31* 1 73* 1 45*  --  1 70*   EGFR ml/min/1 73sq m 57 41 51  --  42   CALCIUM mg/dL 9 1 8 6 8 5  --  9 6   MAGNESIUM mg/dL 2 1  --   --   --   --    PHOSPHORUS mg/dL 2 9  --   --   --   --          Results from last 7 days   Lab Units 07/10/21  1609 07/10/21  0641 07/09/21  2109 07/09/21  1600 07/09/21  1054 07/09/21  0631 07/08/21  2119 07/08/21  1849   POC GLUCOSE mg/dl 143* 122 119 156* 145* 129 143* 173*     Results from last 7 days   Lab Units 07/10/21  0217 07/09/21  0633 07/08/21  1834 07/08/21  1719   GLUCOSE RANDOM mg/dL 123 133 210* 246*       Medications:   Scheduled Medications:  [START ON 7/11/2021] allopurinol, 200 mg, Oral, Daily  aspirin, 81 mg, Oral, Daily  docusate sodium, 100 mg, Oral, BID  hydrALAZINE, 10 mg, Intravenous, Once  insulin lispro, 1-6 Units, Subcutaneous, TID AC  insulin lispro, 1-6 Units, Subcutaneous, HS  metoprolol succinate, 50 mg, Oral, QPM  pantoprazole, 40 mg, Oral, Daily Before Breakfast  pravastatin, 40 mg, Oral, Daily With Dinner      Continuous IV Infusions: none     PRN Meds:  acetaminophen, 650 mg, Oral, Q6H PRN 7/9 x3, 7/10 x1  cyclobenzaprine, 5 mg, Oral, TID PRN 7/10 x2  HYDROcodone-acetaminophen, 1 tablet, Oral, Q4H PRN 7/10 x2  ondansetron, 4 mg, Intravenous, Q6H PRN 7/10 x1        Discharge Plan: TBD    Network Utilization Review Department  ATTENTION: Please call with any questions or concerns to [phone] and carefully listen to the prompts so that you are directed to the right person  All voicemails are confidential   Darnell Spencer all requests for admission clinical reviews, approved or denied determinations and any other requests to dedicated fax number below belonging to the campus where the patient is receiving treatment   List of dedicated fax numbers for the Facilities:  [address] DENIALS (Administrative/Medical Necessity) [phone]   [address] (Maternity/NICU/Pediatrics) [phone]   [address] [place] [address] [address] [place] [address] [address] [place] [address] [phone]

## 2021-07-10 NOTE — ASSESSMENT & PLAN NOTE
Due to RP hematoma   Lab Results   Component Value Date    HGB 9 1 (L) 07/10/2021    HGB 9 7 (L) 07/09/2021    HGB 9 8 (L) 07/09/2021    HGB 10 6 (L) 07/09/2021   hgb slightly dropped, likely hemodilution   S/p embolization by IR of bleeding L2 artery  Hb dropped slightly   Repeat hct in am

## 2021-07-10 NOTE — ASSESSMENT & PLAN NOTE
Lab Results   Component Value Date    CREATININE 1 31 (H) 07/10/2021    CREATININE 1 73 (H) 07/09/2021    CREATININE 1 45 (H) 07/08/2021   · Baseline creatinine is 1 0, currently 1 73- initially felt to be secondary to prerenal azotemia but also could be secondary to RP bleed/ABLA, ARB and HCTZ use, IV contrast   · Was on IV hydration- NSS @100/hr - ivf has been stopped  Renal following  Appreciate follow up  · Continue to Hold hyzaar    · Add urinary retention protocol  · Voiding well with no urinary retention   · Avoid nephrotoxic drugs and hypotension  · Discharge recommendations from nephrology reviewed/discussed - DC hyzaar on Dc  Will need outpt follow up with nephrology in 2 weeks repeat bmp prior to visit  · allopurinol dose increased    · Repeat bmp in am  · Cr coming down

## 2021-07-10 NOTE — PROGRESS NOTES
Follow up Consultation    Nephrology   Radford Skiff 60 y o  male MRN: [id number]  Unit/Bed#: CW2 211-02 Encounter: [id number]      Physician Requesting Consult: Teresita Dempsey MD        ASSESSMENT/PLAN:  60-year-old male with multiple comorbidities including obesity, hypertension, hyperlipidemia, ascending thoracic aortic aneurysm and diabetes recently found to have mesenteric adenopathy is status post IR lymph node biopsy on 26/51/9377 complicated by a retroperitoneal bleed requiring arterial graft and coil embolization  Nephrology has been consulted for evaluation management of acute kidney injury         · Acute kidney injury (POA):  - FINA most likely secondary to prerenal azotemia plus failure to auto regulate in presence of hemodynamic perturbations plus ARB use plus ANDREI (7/8) with likely subsequent ATN  - After review of records In [facility] as well as Care everywhere it appears that the patient has a baseline Creatinine of 0 9-1 1 mg/dL  - patient was admitted with a creatinine of 1 70 mg/dL on 07/08/2021   - patient's creatinine today is at 1 31 mg/dL, stable and improving towards baseline   - hold diuretics and IV fluids at this time  - check BMP in a m if still in the hospital   - CT abdomen from 07/08/2021 does not reveal any solid renal masses no hydronephrosis no calculi  - Await renal recovery  - Optimize hemodynamic status to avoid delay in renal recovery  - Place on a renal diet when allowed diet order    - Avoid nephrotoxins, adjust meds to appropriate GFR   - Strict I/O   - Daily weights  - Urinary retention protocol if patient does not have a [place]  - will need to set up patient for follow up with Nephrology as an outpatient post hospitalization   - message sent to the office to arrange for outpatient follow-up in 2 weeks post hospital discharge with blood work prior to the visit      - stable for discharge from renal standpoint when medically cleared     · Blood pressure/hypertension:  - home medications:  Losartan 100 mg p o  Q day, HCTZ 25 mg p o  Q day, metoprolol 50 mg q h s   - current medications:  Toprol-XL 50 mg q h s   - recommendations:  Hold ARB now and on discharge  - Optimize hemodynamics   - Maintain MAP > 65mmHg  - Avoid BP fluctuations      · H/H/anemia:  - most recent hemoglobin at 9 1 grams/deciliter  - maintain hemoglobin greater than 8 grams/deciliter  - management primary team  - transfuse if hemoglobin less than 7 grams/deciliter     · Acid-base electrolytes:  ? Electrolytes:    § Stable  §    ? Acid-base:    § Most recent bicarb at 25     · Volume status:  ?  Clinically appears to be euvolemic      · Proteinuria:   ? Most recent UA from 05/21/2021 with 2+ protein no blood  Will need further evaluation as an outpatient upon discharge     · Other medical problems:  ? Diabetes:  Management per primary team   On insulin  Most recent A1c of 6 8% as of May 2021  ? Retroperitoneal bleed:  Management per primary team   Follow-up with IR   ? History of elevated uric acid/concern for underlying lymphoma:  Prior uric acid of 8  Recheck uric acid level of 9 3, increase allopurinol to 200 mg p o  Q day   ? Mesenteric/retroperitoneal lymphadenopathy:  Concern for lymphoma status post lymph node biopsy complicated by retroperitoneal bleed is status post coil embolization on 07/08/2021  Follow-up with IR      Thanks for the consult  Will continue to follow  Please call with questions/ concerns  Above-mentioned orders and Plan in terms of acute kidney injury was discussed with the team in [address] [facility] [facility]10[facility], 5:56 AM              Objective :   Patient seen and examined in his room no overnight events hemodynamically stable remains afebrile had issues overnight with increased pain and had repeat CT abdomen without contrast which did not show any increase in the size of the hematoma  Happy to hear renal parameters are stable and improved        PHYSICAL EXAM  BP 127/71   Pulse 85   Temp 98 8 °F (37 1 °C)   Resp 20   Ht 6' 4" (1 93 m)   Wt 136 kg (300 lb)   SpO2 96%   BMI 36 52 kg/m²   Temp (24hrs), Av 5 °F (36 9 °C), Min:98 2 °F (36 8 °C), Max:98 8 °F (37 1 °C)        Intake/Output Summary (Last 24 hours) at 7/10/2021 0556  Last data filed at 2021 2300  Gross per 24 hour   Intake --   Output 250 ml   Net -250 ml       I/O last 24 hours: In: -   Out: 250 [Urine:250]      Current Weight: Weight - Scale: 136 kg (300 lb)  First Weight: Weight - Scale: 136 kg (300 lb)  Physical Exam  Vitals and nursing note reviewed  Constitutional:       General: He is not in acute distress  Appearance: Normal appearance  He is obese  He is not ill-appearing, toxic-appearing or diaphoretic  HENT:      Head: Normocephalic and atraumatic  Mouth/Throat:      Pharynx: Oropharynx is clear  No oropharyngeal exudate  Eyes:      General: No scleral icterus  Conjunctiva/sclera: Conjunctivae normal    Cardiovascular:      Rate and Rhythm: Normal rate  Heart sounds: Normal heart sounds  No friction rub  Pulmonary:      Effort: Pulmonary effort is normal  No respiratory distress  Breath sounds: Normal breath sounds  No wheezing  Abdominal:      General: There is no distension  Palpations: Abdomen is soft  There is no mass  Tenderness: There is no abdominal tenderness  Musculoskeletal:         General: No swelling  Cervical back: Normal range of motion and neck supple  Skin:     General: Skin is warm  Coloration: Skin is not jaundiced  Neurological:      General: No focal deficit present  Mental Status: He is alert and oriented to person, place, and time  Psychiatric:         Mood and Affect: Mood normal          Behavior: Behavior normal              Review of Systems   Constitutional: Negative for chills, fatigue and fever  HENT: Negative for congestion  Respiratory: Negative for cough, shortness of breath and wheezing  Cardiovascular: Negative for leg swelling  Gastrointestinal: Negative for abdominal pain, constipation, diarrhea, nausea and vomiting  Genitourinary: Negative for difficulty urinating and dysuria  Musculoskeletal: Negative for back pain  Skin: Negative for rash  Neurological: Negative for dizziness and headaches  Psychiatric/Behavioral: Negative for agitation and confusion  All other systems reviewed and are negative  Scheduled Meds:  Current Facility-Administered Medications   Medication Dose Route Frequency Provider Last Rate    acetaminophen  650 mg Oral Q6H PRN Joann Fagan, MD      allopurinol  100 mg Oral Daily Joann Fagan, MD      aspirin  81 mg Oral Daily Joann File, MD      cyclobenzaprine  5 mg Oral TID PRN Linda Paulino PA-C      hydrALAZINE  10 mg Intravenous Once Luis Fernando Calabrese MD      HYDROcodone-acetaminophen  1 tablet Oral Q4H PRN Joann Fagan, MD      insulin lispro  1-6 Units Subcutaneous TID AC Joann Fagan, MD      insulin lispro  1-6 Units Subcutaneous HS Joann Fagan, MD      metoprolol succinate  50 mg Oral QPM Joann Fagan, MD      ondansetron  4 mg Intravenous Q6H PRN Luis Fernando Calabrese, MD      pantoprazole  40 mg Oral Daily Before Breakfast Joann Fagan, MD      pravastatin  40 mg Oral Daily With Deisi Greene MD         PRN Meds:   acetaminophen    cyclobenzaprine    HYDROcodone-acetaminophen    ondansetron    Continuous Infusions:       Invasive Devices:      Invasive Devices     Peripheral Intravenous Line            Peripheral IV 07/08/21 Left Forearm 1 day    Peripheral IV 07/08/21 Right Hand 1 day                  LABORATORY:    Results from last 7 days   Lab Units 07/10/21  0217 07/10/21  0216 07/09/21  1244 07/09/21  0633 07/09/21  0119 07/08/21  1834 07/08/21  1747 07/08/21  1719   WBC Thousand/uL  --  8 99  --  10 06  --  14 49*  --  17 59*   HEMOGLOBIN g/dL  --  9 1* 9 7* 9 8* 10 6* 10 5*  --  12 1   I STAT HEMOGLOBIN g/dl --   --   --   --   --   --  10 9*  --    HEMATOCRIT %  --  28 1* 30 9* 30 6* 32 9* 33 1*  --  38 0   HEMATOCRIT, ISTAT %  --   --   --   --   --   --  32*  --    PLATELETS Thousands/uL  --  157  --  191  --  168  --  217   POTASSIUM mmol/L 4 1  --   --  4 5  --  4 4  --  4 7   CHLORIDE mmol/L 105  --   --  104  --  106  --  102   CO2 mmol/L 25  --   --  24  --  24  --  26   CO2, I-STAT mmol/L  --   --   --   --   --   --  24  --    BUN mg/dL 32*  --   --  33*  --  32*  --  34*   CREATININE mg/dL 1 31*  --   --  1 73*  --  1 45*  --  1 70*   CALCIUM mg/dL 9 1  --   --  8 6  --  8 5  --  9 6   MAGNESIUM mg/dL 2 1  --   --   --   --   --   --   --    PHOSPHORUS mg/dL 2 9  --   --   --   --   --   --   --    GLUCOSE, ISTAT mg/dl  --   --   --   --   --   --  217*  --       rest all reviewed    RADIOLOGY:  IR embolization (specify vessel or site)   Final Result by Keith Richards MD (07/09 515)   Coil embolization of an actively bleeding left lumbar L2 artery  Workstation performed: [id number]         CTA abdomen pelvis w wo contrast   Final Result by Keith Richards MD (07/09 8864)      Active bleeding from a left lumbar artery, with associated moderate left retroperitoneal hemorrhage  This finding was identified prior to report generation, and the patient was subsequently taken to arteriography with embolization  Workstation performed: [id number]         IR biopsy lymph node   Final Result by Keith Richards MD (07/09 7373)   CT-guided left para-aortic lymph node biopsy  Workstation performed: [id number]         CT abdomen pelvis wo contrast    (Results Pending)     Rest all reviewed    Portions of the record may have been created with voice recognition software  Occasional wrong word or "sound a like" substitutions may have occurred due to the inherent limitations of voice recognition software  Read the chart carefully and recognize, using context, where substitutions have occurred  If you have any questions, please contact the dictating provider

## 2021-07-10 NOTE — PROGRESS NOTES
[facility]  Progress Note - Ni Aviles 1955, 60 y o  male MRN: [id number]  Unit/Bed#: CW2 211-02 Encounter: [id number]  Primary Care Provider: Della Tabares MD   Date and time admitted to hospital: 7/8/2021  8:16 AM    * Retroperitoneal hematoma  Assessment & Plan  · Likely due to uncontrolled HTN in the setting of lymph node biopsy yesterday  · S/p IR embolization of L2 artery  · Repeat CT abdomen was ordered due to recurrence of severe pain last night  Needed 2 doses of dilaudid and 1 dose of flexeril  CT abdomen reported as stable size hematoma  Pain is minimal today am   · Plan to monitor over 24 hours - repeat Hb and Hct in am      FINA (acute kidney injury) [facility])  Assessment & Plan  Lab Results   Component Value Date    CREATININE 1 31 (H) 07/10/2021    CREATININE 1 73 (H) 07/09/2021    CREATININE 1 45 (H) 07/08/2021   · Baseline creatinine is 1 0, currently 1 73- initially felt to be secondary to prerenal azotemia but also could be secondary to RP bleed/ABLA, ARB and HCTZ use, IV contrast   · Was on IV hydration- NSS @100/hr - ivf has been stopped  Renal following  Appreciate follow up  · Continue to Hold hyzaar    · Add urinary retention protocol  · Voiding well with no urinary retention   · Avoid nephrotoxic drugs and hypotension  · Discharge recommendations from nephrology reviewed/discussed - DC hyzaar on Dc  Will need outpt follow up with nephrology in 2 weeks repeat bmp prior to visit  · allopurinol dose increased  · Repeat bmp in am  · Cr coming down    Heart murmur  Assessment & Plan  · Noted  · Per PCP notes will refer to cardiology at discharge  · ECHO 7/6/21: EF 60%, G1DD   Mitral valve moderate annular calcification    Acute blood loss anemia  Assessment & Plan  Due to RP hematoma   Lab Results   Component Value Date    HGB 9 1 (L) 07/10/2021    HGB 9 7 (L) 07/09/2021    HGB 9 8 (L) 07/09/2021    HGB 10 6 (L) 07/09/2021   hgb slightly dropped, likely hemodilution   S/p embolization by IR of bleeding L2 artery  Hb dropped slightly  Repeat hct in am    Mesenteric lymphadenopathy  Assessment & Plan  · Screening EGD and colonoscopy have been unremarkable  · S/p lymph node biopsy  · Outpatient follow up with oncology for results of biopsy    Obesity with body mass index 30 or greater  Assessment & Plan  BMI 36  Encourage diet and lifestyle modifications     Hypertension  Assessment & Plan  · Presented with hypertensive urgency likely as a result of not taking BP meds- improved today  · restarted metoprolol  · C/w holding hyzaar given FINA  · Avoid hypotension given FINA   · bp controlled      Diabetes 1 5, managed as type 2 [facility])  Assessment & Plan    Lab Results   Component Value Date    HGBA1C 6 8 (H) 05/21/2021   · hold oral agents  · diabetic diet  · fingersticks QID with sliding scale coverage  · Blood sugars controlled      VTE Pharmacologic Prophylaxis: VTE Score: 3 High Risk (Score >/= 5) - Pharmacological DVT Prophylaxis Contraindicated  Sequential Compression Devices Ordered  Patient Centered Rounds: I performed bedside rounds with nursing staff today  Discussions with Specialists or Other Care Team Provider: will discuss with IR about repeat CT abdomen findings and any further recommendations    Education and Discussions with Family / Patient: Patient declined call to   Time Spent for Care: 30 minutes  More than 50% of total time spent on counseling and coordination of care as described above  Current Length of Stay: 1 day(s)  Current Patient Status: Inpatient   Certification Statement: The patient will continue to require additional inpatient hospital stay due to recurrent pain early this amd and needs monitoring if his hematoma does not get worse and needs repeat Hct follow up in am  Discharge Plan: Anticipate discharge tomorrow to home      Code Status: Level 1 - Full Code    Subjective:   Noted to have recurrence of low back pain on left side early this am  He needed 2 doses of iv dilaudid and a dose of flexeril  Pain is minimal this am but does get worse on movement  Pain referred to his left leg  Objective:     Vitals:   Temp (24hrs), Av 5 °F (36 9 °C), Min:98 2 °F (36 8 °C), Max:98 8 °F (37 1 °C)    Temp:  [98 2 °F (36 8 °C)-98 8 °F (37 1 °C)] 98 6 °F (37 °C)  HR:  [79-87] 79  Resp:  [17-20] 18  BP: (114-165)/() 120/69  SpO2:  [92 %-97 %] 95 %  Body mass index is 36 52 kg/m²  Input and Output Summary (last 24 hours): Intake/Output Summary (Last 24 hours) at 7/10/2021 1129  Last data filed at 7/10/2021 0900  Gross per 24 hour   Intake 240 ml   Output 250 ml   Net -10 ml       Physical Exam:   Physical Exam  Vitals and nursing note reviewed  Constitutional:       Appearance: He is well-developed  He is obese  HENT:      Head: Normocephalic and atraumatic  Eyes:      Conjunctiva/sclera: Conjunctivae normal       Pupils: Pupils are equal, round, and reactive to light  Cardiovascular:      Rate and Rhythm: Normal rate and regular rhythm  Heart sounds: No murmur heard  Pulmonary:      Effort: Pulmonary effort is normal  No respiratory distress  Breath sounds: Normal breath sounds  Abdominal:      General: Bowel sounds are normal       Palpations: Abdomen is soft  Tenderness: There is abdominal tenderness (tenderness at left lower back area)  Musculoskeletal:      Cervical back: Neck supple  Skin:     General: Skin is warm and dry  Neurological:      General: No focal deficit present  Mental Status: He is alert and oriented to person, place, and time     Psychiatric:         Mood and Affect: Mood normal           Additional Data:     Labs:  Results from last 7 days   Lab Units 07/10/21  0216 21  0633   WBC Thousand/uL 8 99 10 06   HEMOGLOBIN g/dL 9 1* 9 8*   HEMATOCRIT % 28 1* 30 6*   PLATELETS Thousands/uL 157 191   NEUTROS PCT %  --  77*   LYMPHS PCT %  --  9*   MONOS PCT %  -- 8   EOS PCT %  --  5     Results from last 7 days   Lab Units 07/10/21  0217   SODIUM mmol/L 136   POTASSIUM mmol/L 4 1   CHLORIDE mmol/L 105   CO2 mmol/L 25   BUN mg/dL 32*   CREATININE mg/dL 1 31*   ANION GAP mmol/L 6   CALCIUM mg/dL 9 1   GLUCOSE RANDOM mg/dL 123     Results from last 7 days   Lab Units 07/08/21  1834   INR  1 12     Results from last 7 days   Lab Units 07/10/21  0641 07/09/21  2109 07/09/21  1600 07/09/21  1054 07/09/21  0631 07/08/21  2119 07/08/21  1849   POC GLUCOSE mg/dl 122 119 156* 145* 129 143* 173*               Lines/Drains:  Invasive Devices     Peripheral Intravenous Line            Peripheral IV 07/08/21 Left Forearm 2 days                      Imaging: Reviewed radiology reports from this admission including: abdominal/pelvic CT    Recent Cultures (last 7 days):         Last 24 Hours Medication List:   Current Facility-Administered Medications   Medication Dose Route Frequency Provider Last Rate    acetaminophen  650 mg Oral Q6H PRN Erin Rocha MD      [START ON 7/11/2021] allopurinol  200 mg Oral Daily Sonia Leo MD      aspirin  81 mg Oral Daily Erin Rocha MD      cyclobenzaprine  5 mg Oral TID PRN Birgit Ash PA-C      hydrALAZINE  10 mg Intravenous Once Tanna Arciniega MD      HYDROcodone-acetaminophen  1 tablet Oral Q4H PRN Erin Rocha MD      insulin lispro  1-6 Units Subcutaneous TID AC Erin Rocha MD      insulin lispro  1-6 Units Subcutaneous HS Erin Rocha MD      metoprolol succinate  50 mg Oral QPM Erin Rocha MD      ondansetron  4 mg Intravenous Q6H PRN Tanna Arciniega MD      pantoprazole  40 mg Oral Daily Before Breakfast Erin Rocha MD      pravastatin  40 mg Oral Daily With Cherylene Mae, MD          Today, Patient Was Seen By: Aftab Padron MD    **Please Note: This note may have been constructed using a voice recognition system  **

## 2021-07-10 NOTE — ASSESSMENT & PLAN NOTE
Lab Results   Component Value Date    HGBA1C 6 8 (H) 05/21/2021   · hold oral agents  · diabetic diet  · fingersticks QID with sliding scale coverage  · Blood sugars controlled

## 2021-07-10 NOTE — DISCHARGE INSTRUCTIONS
Embolization   AMBULATORY CARE:   What you need to know about embolization: Embolization is a procedure to create a clot, or block, in a blood vessel  This stops blood from flowing to the area  The procedure may be used to treat many conditions  It can help stop heavy bleeding (hemorrhage), or prevent an aneurysm from rupturing  An abnormal connection between arteries can be removed  Embolization can stop blood flow to a tumor, such as a uterine fibroid or a cancer tumor  Chemotherapy medicine may be given during an embolization to treat a cancer tumor  This is called chemoembolization  How to prepare for the procedure: Embolization is sometimes done as an emergency procedure  This means you will not have time to prepare  For an embolization that is not an emergency, the following are general guidelines for how to prepare:  · Tell your provider about all your allergies  This includes if you have ever had an allergic reaction to contrast liquid, anesthesia, or antibiotics  You may be told not to eat or drink anything after midnight the night before your procedure  Arrange to have someone drive you home  The person should stay with you to help you and watch for problems that may develop  · Give your provider a list of your medicines  Include all medicines and supplements you take  You may need to stop taking blood thinners or aspirin several days before your procedure  This will help decrease your risk for bleeding  Do not stop taking medicines unless your healthcare provider tells you to stop  Your provider will tell you which medicines to take or not take on the day of your procedure  · You may need blood tests to check how well your blood clots and to check your kidney function  Depending on the reason for this procedure, you may an MRI, ultrasound, x-ray, or CT scan   These pictures will help your healthcare provider examine the area to be worked on      · If you are a woman, tell your provider if you know or think you might be pregnant  You may not be able to have certain tests because they may harm an unborn baby  Your provider may need to take extra precautions for other tests  What will happen during the procedure:   · You may be given general anesthesia to keep you asleep and pain-free  You may instead be given moderate sedation  This means you will be awake during the procedure, but you should not feel any pain  Your provider will put numbing medicine on your skin where the procedure will be done  A small incision will be made over an artery  A catheter (thin tube) will be guided into the artery  Contrast liquid will be used to help your healthcare provider see your arteries more easily  · Your provider will use a type of x-ray that gives a moving picture of the arteries  This will help him or her move the catheter into the right place  The catheter is moved up until it reaches the correct artery  Your provider will put medicine or a material into the artery to slow or stop blood from flowing  This may be a coil, foam, beads, a plug, or liquid  The liquid may also contain material that is larger than blood cells  · Your provider will remove the catheter  Pressure will be used to stop any bleeding that happens  The incision area does not need to be closed with stitches  It will be small and close on its own  It will be covered with a bandage to keep it from becoming infected  What to expect after the procedure:   · You may have pain for a few days  Depending on the reason you had this procedure, you may also have a headache or cramps  You may have pain, bleeding, or bruising where the catheter went into your leg  All of these symptoms are normal and should get better soon  You may be given pain medicine through your IV or a pump  A pump allows you to control when the pain medicine is given  · You should expect to stay in the hospital at least overnight   If you had this procedure to treat heavy bleeding, it may take 24 hours to know if the bleeding stopped  · Healthcare providers will help you walk around after your procedure  This will help prevent blood clots  Do not get up until healthcare providers say it is okay  They may want you to lie in one position for a certain amount of time  When they say it is okay to walk, they will help you stand and walk safely  Risks of embolization: You may bleed more than expected or develop an infection  The area being treated may be damaged during the procedure  Your artery may be damaged from the catheter, or you may develop a blood clot  Your kidneys may be damaged from the contrast liquid  The material being put into the artery may go to the wrong place  This can stop blood flow to healthy tissue  The procedure may not work, or it may not relieve your symptoms  Call your doctor or specialist if:   · You have a fever higher than 100 4°F (38°C)  · You have a fever, pain, and nausea that last longer than 3 days  · You suddenly have severe abdominal pain  · You cannot urinate, or you urinate very little  · You have signs of an infection at the catheter site, such as red streaks, pain, or swelling  · You have new or worsening pain  · You have questions or concerns about your condition or care  Medicines: You may need any of the following:  · NSAIDs help decrease swelling and pain or fever  This medicine is available with or without a doctor's order  NSAIDs can cause stomach bleeding or kidney problems in certain people  If you take blood thinner medicine, always ask your healthcare provider if NSAIDs are safe for you  Always read the medicine label and follow directions  · Prescription pain medicine may be given  Ask your healthcare provider how to take this medicine safely  Some prescription pain medicines contain acetaminophen   Do not take other medicines that contain acetaminophen without talking to your healthcare provider  Too much acetaminophen may cause liver damage  Prescription pain medicine may cause constipation  Ask your healthcare provider how to prevent or treat constipation  · Take your medicine as directed  Contact your healthcare provider if you think your medicine is not helping or if you have side effects  Tell him or her if you are allergic to any medicine  Keep a list of the medicines, vitamins, and herbs you take  Include the amounts, and when and why you take them  Bring the list or the pill bottles to follow-up visits  Carry your medicine list with you in case of an emergency  Self-care:   · Rest as needed  Rest and sleep will help your body heal      · Follow your healthcare provider's instructions for activity  He or she will tell you when it is okay to return to your normal activities and to start driving  He or she may want you to wait 1 to 2 weeks to return to work  · Care for the catheter site as directed  It is okay to shower after the procedure  You will only have a small cut in your skin from where the catheter went into your leg  Check the catheter site for signs of infection, including red streaks, pain, and swelling  · Treat symptoms of postembolization syndrome  This syndrome is common after an embolization procedure  It usually starts within 72 hours of the procedure and may last a few days  The main symptoms are fever, pain, and nausea  You will probably be able to manage your symptoms at home  Acetaminophen or an NSAID, such as ibuprofen, can reduce a fever and pain  You may need to eat lightly to manage nausea  Drink more liquids for the first week after the procedure to prevent dehydration  Follow up with your doctor or specialist as directed: You may need to have more tests to check if the procedure worked  Write down your questions so you remember to ask them during your visits    © Copyright [address] 2020 Information is for End User's use only and may not be sold, redistributed or otherwise used for commercial purposes  All illustrations and images included in CareNotes® are the copyrighted property of A D A M , Inc  or Fredrick Kaur  The above information is an  only  It is not intended as medical advice for individual conditions or treatments  Talk to your doctor, nurse or pharmacist before following any medical regimen to see if it is safe and effective for you  Procedural Sedation   WHAT YOU NEED TO KNOW:   Procedural sedation is medicine used during procedures to help you feel relaxed and calm  You will remember little to none of the procedure  After sedation you may feel tired, weak, or unsteady on your feet  You may also have trouble concentrating or short-term memory loss  These symptoms should go away in 24 hours or less  DISCHARGE INSTRUCTIONS:     Call 911 or have someone else call for any of the following:   · You have sudden trouble breathing      · You cannot be woken  Contact Interventional Radiology at [phone]   [place] PATIENTS: Contact Interventional Radiology at [phone]) Latasha Paul PATIENTS: Contact Interventional Radiology at [phone]) if any of the following occur:     · You have a severe headache or dizziness      · Your heart is beating faster than usual     · You have a fever or chills      · Your skin is itchy, swollen, or you have a rash      · You have nausea or are vomiting for more than 8 hours after the procedure       · You have questions or concerns about your condition or care  Self-care:   · Have someone stay with you for 24 hours  This person can drive you to errands and help you do things around the house  This person can also watch for problems       · Rest and do quiet activities for 24 hours  Do not exercise, ride a bike, or play sports  Stand up slowly to prevent dizziness and falls  Take short walks around the house with another person  Slowly return to your usual activities the next day     · Do not drive or use dangerous machines or tools for 24 hours  You may injure yourself or others  Examples include a lawnmower, saw, or drill  Do not return to work for 24 hours if you use dangerous machines or tools for work       · Do not make important decisions for 24 hours  For example, do not sign important papers or invest money       · Drink liquids as directed  Liquids help flush the sedation medicine out of your body  Ask how much liquid to drink each day and which liquids are best for you       · Eat small, frequent meals to prevent nausea and vomiting  Start with clear liquids such as juice or broth  If you do not vomit after clear liquids, you can eat your usual foods       · Do not drink alcohol or take medicines that make you drowsy  This includes medicines that help you sleep and anxiety medicines  Ask your healthcare provider if it is safe for you to take pain medicine  Follow up with your healthcare provider as directed: Write down your questions so you remember to ask them during your visits  POST BIOPSY    Care after your procedure:    1  Limit your activities for 24 hours after your biopsy  2  No driving day of biopsy  3  Return to your normal diet  Small sips of flat soda will help with mild nausea  4  Remove band-aid or dressing 24 hours after procedure  Contact Interventional Radiology at [phone] [place] PATIENTS: Contact Interventional Radiology at [phone]) Jc Fink PATIENTS: Contact Interventional Radiology at [phone]) if:    1  Difficulty breathing, nausea or vomiting  2  Chills or fever above 101 degrees F      3  Pain at biopsy site not relieved by medication  4  Develop any redness, swelling, heat, unusual drainage, heavy bruising or bleeding from biopsy site  Acute Kidney Injury (FINA)  You have been diagnosed with Acute Kidney Injury (FINA)   The following information has been developed to provide you with information about FINA and treatment  What is Acute Kidney Injury (FINA)? FINA occurs when kidney function decreases over a short period of time  This condition causes a buildup of waste products in the blood and can cause fluid to build up causing swelling in the legs and shortness of breath  Sometimes called Acute Kidney Failure or Acute Renal Failure, FINA is often reversible if it is found and treated quickly  How do you know if you have FINA? FINA is diagnosed by assessing kidney function  This is done by obtaining a blood test to measure the blood level of creatinine  Decreased urine output can sometimes also indicate FINA  Who is at risk for FINA? FINA can happen to anyone but usually happens to people who are already sick and may be in the hospital  People are at higher risk for FINA if they have any of the following:   age 72 years or older   high or low blood pressure   underlying kidney disease (e g , Chronic Kidney Disease (CKD)   peripheral vascular disease (hardening of arteries)   chronic diseases such as liver disease, heart disease and diabetes   a single kidney    What are the symptoms of FINA? You may or may not have the symptoms to suggest you have kidney injury until the FINA has progressed  Some of the symptoms are listed below:   Not making enough urine   Increased swelling in legs    Feeling tired   Trouble breathing or shortness of breath   Nausea   New or worsening confusion    What causes FINA? The causes are divided into three categories:   Not enough blood flowing to the kidneys (e g , low blood pressure, bleeding, diarrhea, dehydration)   Injury directly to the kidneys (e g , blood clots, severe infections such as sepsis, medicine toxicity, IV contrast dye used for cardiac catheterization or CT scans)   Blockage to the tubes (ureters) that drain the urine from the kidneys (e g , enlarged prostate, kidney stones, blood clots)    What is the treatment for FINA?   The treatment for FINA depends on correcting what caused it  Treatment usually involves removing the cause and measures to prevent further injury to the kidneys  This may require the use of intravenous fluids or medications and/or temporary dialysis  Dialysis is a process using a machine that does the job of the kidneys to remove waste and help correct the electrolyte and fluid balance while the kidneys are recovering  If dialysis is needed to treat FINA, the doctor will assess daily to see if the kidneys are showing signs of recovery  The daily assessments determine how long dialysis needs to continue  Depending on the cause and the extent of damage, an episode of FINA may resolve in a few days to several weeks to several months  What are the long term effects of having an episode of FINA?  People who have one episode of FINA are at an increased risk of having another episode of FINA as well as other health problems such as kidney disease, stroke, and heart disease   In a small number of people who had unrecognized kidney disease, an FINA episode may result in Chronic Kidney Disease (CKD) which requires lifelong monitoring and treatment  How do you prevent future episodes of FINA?  Make sure to follow up with the kidney doctor after hospital discharge and obtain blood work to reassess and monitor kidney function   If you have diabetes, keep your blood sugar in goal range and keep appointments with your diabetes specialist    Delores Huff If you have high blood pressure, have your blood pressure checked regularly to make sure it is in target range  o If you take blood pressure medicine called ACEIs or ARBs (e g , Lisinopril, Enalapril, Diovan, Losartan), your doctor may tell you to skip a dose or two if you have severe dehydration and your blood pressure is running low   Avoid using medicines such as NSAIDs (Nonsteroidal Anti-inflammatory Drugs) and Barnes-2 Inhibitors (a type of NSAID) that may be harmful to kidney function   These may include the medicines listed in the table that follows  Examples of NSAIDS and Barnes-2 Inhibitors   Talk to your doctor or healthcare provider before stopping any medicine ordered for you  Celecoxib (CELEBREX) Ketoprofen (ORUDIS, ORUVAIL)   Diclofenac (VOLTAREN, CATAFLAM) Ketorolac (TORADOL)   Diflunisal (DOLOBID) Meloxicam (MOBIC)   Etodolac (LODINE) Nabumetone (RELAFEN)   Fenoprofen (NALFON) Naproxen (ALEVE, NAPROSYN,    NAPRELAN, ANAPROX)   Flurbiprofen (ANSAID) Oxaprozin (DAYPRO)   Ibuprofen (MOTRIN, ADVIL) Piroxicam (FELDENE)   Indomethacin (INDOCIN) Sulindac (CLINORIL)    Tolmetin (Marda Foots)     Is there a special diet for people with FINA? People with FINA and/or other kidney disease often have high potassium and phosphorus levels in their blood  To protect the kidneys from further injury and to avoid complications, most doctors recommend following a healthy diet choosing foods low potassium and low phosphorus   Limiting dietary potassium to 2 5 grams/day and phosphorus to 800 milligrams/day is recommended   A dietitian can help you with learning more about this type of diet   The tables on the following page may help you to choose lower potassium and phosphorus foods  The following websites are also good sources of information:  Karen at  org/nutrition/Kidney-Disease-Stages-1-4   [facility]  https://www niddk nih gov/health-information/kidney-disease/chronic-kidney-disease-ckd/eating-nutrition  https://[facility] org/health/articles/15641-renal-diet-basics  [employer]    If you have any questions or concerns about your condition, please contact your doctor or healthcare provider  These tables may help you to choose lower potassium and phosphorus foods    AVOID these higher phosphorus* foods: CHOOSE these lower phosphorus* foods:   Milk, pudding , yogurt made from animals and from many soy varieties Rice milk (unfortified), nondairy creamer   Hard cheeses, ricotta, cottage cheese, fat-free cream cheese Regular and low-fat cream cheese   Ice cream, frozen yogurt Sherbet, frozen fruit pops, sorbet   Soups made with milk, dried peas, beans, lentils or other high phosphorus ingredients Soups made with broth, are water-based, or other lower phosphorus ingredients   Whole grains, including whole-grain breads, crackers, cereal, rice and pasta, corn tortillas Refined grains, including white bread, crackers, cereals, rice and pasta   Quick breads, biscuits, cornbread, muffins, pancakes, waffles, granola, wheat germ Homemade refined (white) dinner rolls, bagels, English muffins, sugar cookies, shortbread cookies, arabella food cake   Dried peas (split, black-eyed), beans (black, garbanzo, lima, kidney, navy, lord), lentils Green peas (canned, frozen), green beans, wax beans   Organ meats, walleye, [place], sardines Lean beef, pork, lamb, poultry, other fish   Nuts and seeds Popcorn   Peanut butter, other nut butters; tofu, veggie or soy burgers Jam, jelly, honey   Chocolate, including chocolate drinks Carob (chocolate-flavored) candy, hard candy,  gumdrops   Anh, pepper-type sodas, flavored correia, bottled teas, beer Lemon-lime soda, ginger ale or root beer, plain water, cream soda, grape soda   *Always read labels and avoid foods with ingredients containing "phos"  AVOID these higher potassium foods: CHOOSE these lower potassium foods:      Milk (fat free, low fat, whole, buttermilk, Soy), yogurt    Regular and low-fat cream cheese      Beans (white, [place]), [place] sprouts, spinach Swiss       chard, broccoli, avocado, artichoke, potatoes, sweet      potatoes, tomatoes/tomato sauce, beet greens      Green beans, alfalfa sprouts, bamboo shoots (canned),       cabbage, carrots, cauliflower, corn, cucumber, eggplant,      endive, lettuce, mushrooms, onions, radishes,  watercress, water chestnuts (canned), rice, peas      Halibut, tuna, cod, snapper, tuna fish, turkey    Egg, lean beef, pork, lamb, shellfish, chicken       Banana, papaya, orange, cantaloupe, dates, raisins and      other dried fruit, pomegranate, avocado      Apple, applesauce, blackberries, raspberries, pears,     watermelon, cucumbers, blueberries, cranberries,       peaches      Almonds, peanuts, hazelnuts, [place], cashew, mixed,      seeds (sunflower, pumpkin)     Homemade refined (white) dinner rolls, bagels,      English muffins, flour tortilla, crackers, [place]      crackers, popcorn, pretzels, spaghetti or macaroni,       hummus      Tomato or vegetable juice, prune juice    Papaya, merry, or pear nectar, cranberry juice cocktail      Molasses

## 2021-07-11 LAB
ANION GAP SERPL CALCULATED.3IONS-SCNC: 4 MMOL/L (ref 4–13)
BUN SERPL-MCNC: 26 MG/DL (ref 5–25)
CALCIUM SERPL-MCNC: 9 MG/DL (ref 8.3–10.1)
CHLORIDE SERPL-SCNC: 105 MMOL/L (ref 100–108)
CO2 SERPL-SCNC: 27 MMOL/L (ref 21–32)
CREAT SERPL-MCNC: 1.07 MG/DL (ref 0.6–1.3)
GFR SERPL CREATININE-BSD FRML MDRD: 73 ML/MIN/1.73SQ M
GLUCOSE SERPL-MCNC: 129 MG/DL (ref 65–140)
GLUCOSE SERPL-MCNC: 133 MG/DL (ref 65–140)
GLUCOSE SERPL-MCNC: 147 MG/DL (ref 65–140)
GLUCOSE SERPL-MCNC: 152 MG/DL (ref 65–140)
GLUCOSE SERPL-MCNC: 166 MG/DL (ref 65–140)
HCT VFR BLD AUTO: 25 % (ref 36.5–49.3)
HGB BLD-MCNC: 7.8 G/DL (ref 12–17)
POTASSIUM SERPL-SCNC: 4.3 MMOL/L (ref 3.5–5.3)
SODIUM SERPL-SCNC: 136 MMOL/L (ref 136–145)

## 2021-07-11 PROCEDURE — 80048 BASIC METABOLIC PNL TOTAL CA: CPT | Performed by: INTERNAL MEDICINE

## 2021-07-11 PROCEDURE — 82948 REAGENT STRIP/BLOOD GLUCOSE: CPT

## 2021-07-11 PROCEDURE — 85018 HEMOGLOBIN: CPT | Performed by: INTERNAL MEDICINE

## 2021-07-11 PROCEDURE — 85014 HEMATOCRIT: CPT | Performed by: INTERNAL MEDICINE

## 2021-07-11 PROCEDURE — NC001 PR NO CHARGE: Performed by: RADIOLOGY

## 2021-07-11 PROCEDURE — 99232 SBSQ HOSP IP/OBS MODERATE 35: CPT | Performed by: INTERNAL MEDICINE

## 2021-07-11 RX ADMIN — DOCUSATE SODIUM 100 MG: 100 CAPSULE, LIQUID FILLED ORAL at 08:28

## 2021-07-11 RX ADMIN — ACETAMINOPHEN 650 MG: 325 TABLET, FILM COATED ORAL at 00:11

## 2021-07-11 RX ADMIN — INSULIN LISPRO 1 UNITS: 100 INJECTION, SOLUTION INTRAVENOUS; SUBCUTANEOUS at 20:55

## 2021-07-11 RX ADMIN — ALLOPURINOL 200 MG: 100 TABLET ORAL at 08:28

## 2021-07-11 RX ADMIN — PRAVASTATIN SODIUM 40 MG: 40 TABLET ORAL at 17:19

## 2021-07-11 RX ADMIN — HYDROCODONE BITARTRATE AND ACETAMINOPHEN 1 TABLET: 5; 325 TABLET ORAL at 14:35

## 2021-07-11 RX ADMIN — CYCLOBENZAPRINE HYDROCHLORIDE 5 MG: 10 TABLET, FILM COATED ORAL at 14:35

## 2021-07-11 RX ADMIN — METOPROLOL SUCCINATE 50 MG: 50 TABLET, EXTENDED RELEASE ORAL at 17:19

## 2021-07-11 RX ADMIN — CYCLOBENZAPRINE HYDROCHLORIDE 5 MG: 10 TABLET, FILM COATED ORAL at 20:42

## 2021-07-11 RX ADMIN — INSULIN LISPRO 1 UNITS: 100 INJECTION, SOLUTION INTRAVENOUS; SUBCUTANEOUS at 06:10

## 2021-07-11 RX ADMIN — DOCUSATE SODIUM 100 MG: 100 CAPSULE, LIQUID FILLED ORAL at 17:19

## 2021-07-11 RX ADMIN — ACETAMINOPHEN 650 MG: 325 TABLET, FILM COATED ORAL at 08:28

## 2021-07-11 RX ADMIN — HYDROCODONE BITARTRATE AND ACETAMINOPHEN 1 TABLET: 5; 325 TABLET ORAL at 20:42

## 2021-07-11 RX ADMIN — ASPIRIN 81 MG CHEWABLE TABLET 81 MG: 81 TABLET CHEWABLE at 08:28

## 2021-07-11 RX ADMIN — PANTOPRAZOLE SODIUM 40 MG: 40 TABLET, DELAYED RELEASE ORAL at 06:12

## 2021-07-11 RX ADMIN — CYCLOBENZAPRINE HYDROCHLORIDE 5 MG: 10 TABLET, FILM COATED ORAL at 06:08

## 2021-07-11 RX ADMIN — HYDROCODONE BITARTRATE AND ACETAMINOPHEN 1 TABLET: 5; 325 TABLET ORAL at 06:08

## 2021-07-11 NOTE — ASSESSMENT & PLAN NOTE
· Likely due to uncontrolled HTN in the setting of lymph node biopsy  · S/p IR embolization of L2 artery  · Repeat CT abdomen was ordered on 7/10 due to recurrence of severe pain  Needed 2 doses of dilaudid and 1 dose of flexeril  CT abdomen reported as stable size hematoma    · He continues to have pain - but pt hesitant about taking narcotics  · Plan to monitor over 24 hours - repeat Hb and Hct in am  Discussed repeat CT abdomen with IR since his pain is not better and Hb dropped further today - hematoma size is stable and plan to monitor as discussed with IR

## 2021-07-11 NOTE — ASSESSMENT & PLAN NOTE
· Presented with hypertensive urgency likely as a result of not taking BP meds- improved  · restarted metoprolol  · C/w holding hyzaar given FINA  · Avoid hypotension given FINA   · bp controlled

## 2021-07-11 NOTE — ASSESSMENT & PLAN NOTE
Lab Results   Component Value Date    CREATININE 1 07 07/11/2021    CREATININE 1 31 (H) 07/10/2021    CREATININE 1 73 (H) 07/09/2021   · Baseline creatinine is 1 0, currently 1 73- initially felt to be secondary to prerenal azotemia but also could be secondary to RP bleed/ABLA, ARB and HCTZ use, IV contrast   · Was on IV hydration- NSS @100/hr - ivf has been stopped  Renal following  Appreciate follow up  · Continue to Hold hyzaar    · Add urinary retention protocol  · Voiding well with no urinary retention   · Avoid nephrotoxic drugs and hypotension  · Discharge recommendations from nephrology reviewed/discussed - DC hyzaar on Dc   Will need outpt follow up with nephrology in 2 weeks repeat bmp prior to visit  · allopurinol dose increased as per renal  · Cr coming down

## 2021-07-11 NOTE — PROGRESS NOTES
IR contacted due to dropping Hbg from 9 7 to 7 8 s/p embolization of a psoas hematoma  The hematoma appears stable on repeat CT  Recommend transfusing as needed and continue to monitor the Hbg for another 2 days to see if this was hemodilutional or if there is still some slow bleeding  Discussed with Dr Yoana Adair

## 2021-07-11 NOTE — PROGRESS NOTES
Follow up Consultation    Nephrology   Sheilah Bloch 60 y o  male MRN: [id number]  Unit/Bed#: CW2 211-02 Encounter: [id number]      Physician Requesting Consult: Lorrie Thacker MD        ASSESSMENT/PLAN:  15-year-old male with multiple comorbidities including obesity, hypertension, hyperlipidemia, ascending thoracic aortic aneurysm and diabetes recently found to have mesenteric adenopathy is status post IR lymph node biopsy on 66/38/0326 complicated by a retroperitoneal bleed requiring arterial graft and coil embolization  Nephrology has been consulted for evaluation management of acute kidney injury         · Acute kidney injury (POA):  - FINA most likely secondary to prerenal azotemia plus failure to auto regulate in presence of hemodynamic perturbations plus ARB use plus ANDREI (7/8) with likely subsequent ATN  - After review of records In [facility] as well as Care everywhere it appears that the patient has a baseline Creatinine of 0 9-1 1 mg/dL  - patient was admitted with a creatinine of 1 70 mg/dL on 07/08/2021   - patient's creatinine today is at 1 07 mg/dL, stable and improved back to baseline   - hold diuretics and IV fluids at this time  - check BMP in a m if still in the hospital   - CT abdomen from 07/08/2021 does not reveal any solid renal masses no hydronephrosis no calculi  - Await renal recovery  - Optimize hemodynamic status to avoid delay in renal recovery  - Place on a renal diet when allowed diet order    - Avoid nephrotoxins, adjust meds to appropriate GFR   - Strict I/O   - Daily weights  - Urinary retention protocol if patient does not have a [place]  - will need to set up patient for follow up with Nephrology as an outpatient post hospitalization   - message sent to the office to arrange for outpatient follow-up in 2 weeks post hospital discharge with blood work prior to the visit      - stable for discharge from renal standpoint when medically cleared     · Blood pressure/hypertension:  - home medications:  Losartan 100 mg p o  Q day, HCTZ 25 mg p o  Q day, metoprolol 50 mg q h s   - current medications:  Toprol-XL 50 mg q h s   - recommendations:  Hold ARB and HCTZ now and on discharge  If needed may be resumed post discharged after follow-up  - Optimize hemodynamics   - Maintain MAP > 65mmHg  - Avoid BP fluctuations      · H/H/anemia:  - most recent hemoglobin at 7 8 grams/deciliter,  decreased from yesterday  - maintain hemoglobin greater than 8 grams/deciliter  - management primary team  - transfuse if hemoglobin less than 7 grams/deciliter     · Acid-base electrolytes:  ? Electrolytes:    § Stable  §    ? Acid-base:    § Most recent bicarb at 27     · Volume status:  ?  Clinically appears to be euvolemic      · Proteinuria:   ? Most recent UA from 05/21/2021 with 2+ protein no blood  Will need further evaluation as an outpatient upon discharge     · Other medical problems:  ? Diabetes:  Management per primary team   On insulin  Most recent A1c of 6 8% as of May 2021  ? Retroperitoneal bleed:  Management per primary team   Follow-up with IR   ? History of elevated uric acid/concern for underlying lymphoma:  Prior uric acid of 8  Recheck uric acid level of 9 3, continue allopurinol to 200 mg p o  Q day   ? Mesenteric/retroperitoneal lymphadenopathy:  Concern for lymphoma status post lymph node biopsy complicated by retroperitoneal bleed is status post coil embolization on 07/08/2021  Follow-up with IR      Thanks for the consult  Will sign off  Please call with questions/ concerns  Above-mentioned orders and Plan in terms of acute kidney injury was discussed with the team in [address] Kaur MD, FASN, 7/11/2021, 5:59 AM              Objective :   Patient seen and examined in his room earlier this a m  Hemodynamically stable remains afebrile urine output not adequately documented  Happy to hear renal parameters are improved        PHYSICAL EXAM  /67   Pulse 84   Temp 98 5 °F (36 9 °C)   Resp 18   Ht 6' 4" (1 93 m)   Wt 136 kg (300 lb)   SpO2 94%   BMI 36 52 kg/m²   Temp (24hrs), Av 6 °F (37 °C), Min:98 5 °F (36 9 °C), Max:98 6 °F (37 °C)        Intake/Output Summary (Last 24 hours) at 2021 0559  Last data filed at 7/10/2021 1130  Gross per 24 hour   Intake 400 ml   Output --   Net 400 ml       I/O last 24 hours: In: 400 [P O :400]  Out: 250 [Urine:250]      Current Weight: Weight - Scale: 136 kg (300 lb)  First Weight: Weight - Scale: 136 kg (300 lb)  Physical Exam  Vitals and nursing note reviewed  Constitutional:       General: He is not in acute distress  Appearance: Normal appearance  He is obese  He is not ill-appearing, toxic-appearing or diaphoretic  HENT:      Head: Normocephalic and atraumatic  Mouth/Throat:      Mouth: Mucous membranes are moist       Pharynx: Oropharynx is clear  No oropharyngeal exudate  Eyes:      General: No scleral icterus  Conjunctiva/sclera: Conjunctivae normal    Cardiovascular:      Rate and Rhythm: Normal rate  Heart sounds: Normal heart sounds  No friction rub  Pulmonary:      Effort: Pulmonary effort is normal  No respiratory distress  Breath sounds: Normal breath sounds  No wheezing  Abdominal:      General: There is no distension  Palpations: Abdomen is soft  There is no mass  Tenderness: There is no abdominal tenderness  Musculoskeletal:      Cervical back: Normal range of motion and neck supple  Comments: Right lower extremity dressing   Skin:     General: Skin is warm  Coloration: Skin is not jaundiced  Neurological:      General: No focal deficit present  Mental Status: He is alert and oriented to person, place, and time  Psychiatric:         Mood and Affect: Mood normal          Behavior: Behavior normal              Review of Systems   Constitutional: Negative for appetite change, chills and fatigue  HENT: Negative for congestion      Respiratory: Negative for cough, shortness of breath and wheezing  Cardiovascular: Negative for leg swelling  Gastrointestinal: Negative for abdominal pain, constipation, diarrhea, nausea and vomiting  Genitourinary: Negative for difficulty urinating and dysuria  Musculoskeletal: Negative for back pain  Skin: Negative for rash  Neurological: Negative for dizziness and headaches  Psychiatric/Behavioral: Negative for agitation and confusion  All other systems reviewed and are negative  Scheduled Meds:  Current Facility-Administered Medications   Medication Dose Route Frequency Provider Last Rate    acetaminophen  650 mg Oral Q6H PRN Kye Kidd MD      allopurinol  200 mg Oral Daily Clovis Norwood MD      aspirin  81 mg Oral Daily yKe Kidd MD      cyclobenzaprine  5 mg Oral TID PRN Baudilio Doss PA-C      docusate sodium  100 mg Oral BID TANVIR Tomlinson      hydrALAZINE  10 mg Intravenous Once Eladio Koch MD      HYDROcodone-acetaminophen  1 tablet Oral Q4H PRN Kye Kidd MD      insulin lispro  1-6 Units Subcutaneous TID AC Kye Kidd MD      insulin lispro  1-6 Units Subcutaneous HS Kye Kidd MD      metoprolol succinate  50 mg Oral QPM Kye Kidd MD      ondansetron  4 mg Intravenous Q6H PRN Eladio Koch MD      pantoprazole  40 mg Oral Daily Before Breakfast Kye Kidd MD      pravastatin  40 mg Oral Daily With Julio Monteiro MD         PRN Meds:   acetaminophen    cyclobenzaprine    HYDROcodone-acetaminophen    ondansetron    Continuous Infusions:       Invasive Devices:      Invasive Devices     Peripheral Intravenous Line            Peripheral IV 07/08/21 Left Forearm 2 days                  LABORATORY:    Results from last 7 days   Lab Units 07/10/21  0217 07/10/21  0216 07/09/21  1244 07/09/21  0633 07/09/21  0119 07/08/21  1834 07/08/21  1747 07/08/21  1719   WBC Thousand/uL  --  8 99  --  10 06  --  14 49*  --  17 59*   HEMOGLOBIN g/dL  --  9 1* 9 7* 9 8* 10 6* 10 5*  --  12 1   I STAT HEMOGLOBIN g/dl  --   --   --   --   --   --  10 9*  --    HEMATOCRIT %  --  28 1* 30 9* 30 6* 32 9* 33 1*  --  38 0   HEMATOCRIT, ISTAT %  --   --   --   --   --   --  32*  --    PLATELETS Thousands/uL  --  157  --  191  --  168  --  217   POTASSIUM mmol/L 4 1  --   --  4 5  --  4 4  --  4 7   CHLORIDE mmol/L 105  --   --  104  --  106  --  102   CO2 mmol/L 25  --   --  24  --  24  --  26   CO2, I-STAT mmol/L  --   --   --   --   --   --  24  --    BUN mg/dL 32*  --   --  33*  --  32*  --  34*   CREATININE mg/dL 1 31*  --   --  1 73*  --  1 45*  --  1 70*   CALCIUM mg/dL 9 1  --   --  8 6  --  8 5  --  9 6   MAGNESIUM mg/dL 2 1  --   --   --   --   --   --   --    PHOSPHORUS mg/dL 2 9  --   --   --   --   --   --   --    GLUCOSE, ISTAT mg/dl  --   --   --   --   --   --  217*  --       rest all reviewed    RADIOLOGY:  CT abdomen pelvis wo contrast   Final Result by Cynthia Mckinney MD (07/10 2172)         1  Redemonstration of large left retroperitoneal hematoma involving the iliopsoas muscle with no interval increase in size  No new hemorrhage identified  2   Stable retroperitoneal and pelvic lymphadenopathy  3   Small left pleural effusion with basilar atelectasis  Workstation performed: [id number]         IR embolization (specify vessel or site)   Final Result by Tino Saul MD (07/09 7257)   Coil embolization of an actively bleeding left lumbar L2 artery  Workstation performed: [id number]         CTA abdomen pelvis w wo contrast   Final Result by Tino Saul MD (07/09 1636)      Active bleeding from a left lumbar artery, with associated moderate left retroperitoneal hemorrhage  This finding was identified prior to report generation, and the patient was subsequently taken to arteriography with embolization        Workstation performed: [id number]         IR biopsy lymph node   Final Result by Tino Saul MD (07/09 9432)   CT-guided left para-aortic lymph node biopsy  Workstation performed: [id number]           Rest all reviewed    Portions of the record may have been created with voice recognition software  Occasional wrong word or "sound a like" substitutions may have occurred due to the inherent limitations of voice recognition software  Read the chart carefully and recognize, using context, where substitutions have occurred  If you have any questions, please contact the dictating provider

## 2021-07-11 NOTE — PROGRESS NOTES
[address] [facility]  Progress Note - Kendal Montano 1955, 60 y o  male MRN: [id number]  Unit/Bed#: 2 211-02 Encounter: [id number]  Primary Care Provider: Lorri Child MD   Date and time admitted to hospital: 7/8/2021  8:16 AM    * Retroperitoneal hematoma  Assessment & Plan  · Likely due to uncontrolled HTN in the setting of lymph node biopsy  · S/p IR embolization of L2 artery  · Repeat CT abdomen was ordered on 7/10 due to recurrence of severe pain  Needed 2 doses of dilaudid and 1 dose of flexeril  CT abdomen reported as stable size hematoma  · He continues to have pain - but pt hesitant about taking narcotics  · Plan to monitor over 24 hours - repeat Hb and Hct in am  Discussed repeat CT abdomen with IR since his pain is not better and Hb dropped further today - hematoma size is stable and plan to monitor as discussed with IR    FINA (acute kidney injury) [facility])  Assessment & Plan  Lab Results   Component Value Date    CREATININE 1 07 07/11/2021    CREATININE 1 31 (H) 07/10/2021    CREATININE 1 73 (H) 07/09/2021   · Baseline creatinine is 1 0, currently 1 73- initially felt to be secondary to prerenal azotemia but also could be secondary to RP bleed/ABLA, ARB and HCTZ use, IV contrast   · Was on IV hydration- NSS @100/hr - ivf has been stopped  Renal following  Appreciate follow up  · Continue to Hold hyzaar    · Add urinary retention protocol  · Voiding well with no urinary retention   · Avoid nephrotoxic drugs and hypotension  · Discharge recommendations from nephrology reviewed/discussed - DC hyzaar on Dc   Will need outpt follow up with nephrology in 2 weeks repeat bmp prior to visit  · allopurinol dose increased as per renal  · Cr coming down    Mesenteric lymphadenopathy  Assessment & Plan  · Screening EGD and colonoscopy have been unremarkable  · S/p lymph node biopsy  · Outpatient follow up with oncology for results of biopsy    Obesity with body mass index 30 or greater  Assessment & Plan  BMI 36  Encourage diet and lifestyle modifications     Diabetic peripheral neuropathy [facility])  Assessment & Plan  Lab Results   Component Value Date    HGBA1C 6 8 (H) 2021       Recent Labs     07/10/21  1609 07/10/21  2107 21  0537 21  1106   POCGLU 143* 122 152* 133       Blood Sugar Average: Last 72 hrs:  (P) 139 [id number]    Hypertension  Assessment & Plan  · Presented with hypertensive urgency likely as a result of not taking BP meds- improved  · restarted metoprolol  · C/w holding hyzaar given FINA  · Avoid hypotension given FINA   · bp controlled      Diabetes 1 5, managed as type 2 [facility])  Assessment & Plan    Lab Results   Component Value Date    HGBA1C 6 8 (H) 2021   · hold oral agents  · diabetic diet  · fingersticks QID with sliding scale coverage  · Blood sugars controlled        VTE Pharmacologic Prophylaxis: VTE Score: 3 Moderate Risk (Score 3-4) - Pharmacological DVT Prophylaxis Contraindicated  Sequential Compression Devices Ordered  Patient Centered Rounds: I performed bedside rounds with nursing staff today  Discussions with Specialists or Other Care Team Provider:     Education and Discussions with Family / Patient: Updated  (wife) at bedside  Time Spent for Care: 30 minutes  More than 50% of total time spent on counseling and coordination of care as described above  Current Length of Stay: 2 day(s)  Current Patient Status: Inpatient   Certification Statement: The patient will continue to require additional inpatient hospital stay due to dropping Hb - monitor for further worsening of hematoma  Discharge Plan: Anticipate discharge in 24-48 hrs to home  Code Status: Level 1 - Full Code    Subjective:   Continues to have abdominal pain but tolerable   Pain controlled with flexeril- hesitant to take norco  No chest pain or dyspnea    Objective:     Vitals:   Temp (24hrs), Av 8 °F (37 1 °C), Min:98 5 °F (36 9 °C), Max:98 9 °F (37 2 °C)    Temp:  [98 5 °F (36 9 °C)-98 9 °F (37 2 °C)] 98 7 °F (37 1 °C)  HR:  [77-87] 87  Resp:  [18-20] 20  BP: ()/(49-67) 108/66  SpO2:  [93 %-95 %] 95 %  Body mass index is 36 52 kg/m²  Input and Output Summary (last 24 hours): Intake/Output Summary (Last 24 hours) at 7/11/2021 1610  Last data filed at 7/11/2021 0800  Gross per 24 hour   Intake 160 ml   Output 300 ml   Net -140 ml       Physical Exam:     Physical Exam  Vitals and nursing note reviewed  Constitutional:       Appearance: He is well-developed  He is obese  Cardiovascular:      Rate and Rhythm: Normal rate and regular rhythm  Heart sounds: No murmur heard  Pulmonary:      Effort: Pulmonary effort is normal  No respiratory distress  Breath sounds: Normal breath sounds  Abdominal:      General: Bowel sounds are normal       Palpations: Abdomen is soft  Tenderness: There is abdominal tenderness (mild tenderness at left lower back area)  Musculoskeletal:      Cervical back: Neck supple  Skin:     General: Skin is warm and dry  Neurological:      General: No focal deficit present  Mental Status: He is alert and oriented to person, place, and time     Psychiatric:         Mood and Affect: Mood normal        Additional Data:     Labs:  Results from last 7 days   Lab Units 07/11/21  0613 07/10/21  0216 07/09/21  0633   WBC Thousand/uL  --  8 99 10 06   HEMOGLOBIN g/dL 7 8* 9 1* 9 8*   HEMATOCRIT % 25 0* 28 1* 30 6*   PLATELETS Thousands/uL  --  157 191   NEUTROS PCT %  --   --  77*   LYMPHS PCT %  --   --  9*   MONOS PCT %  --   --  8   EOS PCT %  --   --  5     Results from last 7 days   Lab Units 07/11/21  0613   SODIUM mmol/L 136   POTASSIUM mmol/L 4 3   CHLORIDE mmol/L 105   CO2 mmol/L 27   BUN mg/dL 26*   CREATININE mg/dL 1 07   ANION GAP mmol/L 4   CALCIUM mg/dL 9 0   GLUCOSE RANDOM mg/dL 129     Results from last 7 days   Lab Units 07/08/21  1834   INR  1 12     Results from last 7 days Lab Units 07/11/21  1106 07/11/21  0537 07/10/21  2107 07/10/21  1609 07/10/21  0641 07/09/21  2109 07/09/21  1600 07/09/21  1054 07/09/21  0631 07/08/21  2119 07/08/21  1849   POC GLUCOSE mg/dl 133 152* 122 143* 122 119 156* 145* 129 143* 173*               Lines/Drains:  Invasive Devices     Peripheral Intravenous Line            Peripheral IV 07/08/21 Left Forearm 3 days                      Imaging:     Recent Cultures (last 7 days):         Last 24 Hours Medication List:   Current Facility-Administered Medications   Medication Dose Route Frequency Provider Last Rate    acetaminophen  650 mg Oral Q6H PRN Debbi Siddiqui MD      allopurinol  200 mg Oral Daily Cookieguille Webb MD      aspirin  81 mg Oral Daily Debbi Siddiqui MD      cyclobenzaprine  5 mg Oral TID PRN Iban Rios PA-C      docusate sodium  100 mg Oral BID TANVIR Wang      hydrALAZINE  10 mg Intravenous Once Jay Shanks MD      HYDROcodone-acetaminophen  1 tablet Oral Q4H PRN Debbi Siddiqui MD      insulin lispro  1-6 Units Subcutaneous TID AC Debbi Siddiqui MD      insulin lispro  1-6 Units Subcutaneous HS Debbi Siddiqui MD      metoprolol succinate  50 mg Oral QPM Debbi Siddiqui MD      ondansetron  4 mg Intravenous Q6H PRN Jay Shanks MD      pantoprazole  40 mg Oral Daily Before Breakfast Debbi Siddiqui MD      pravastatin  40 mg Oral Daily With Jared Ernandez MD          Today, Patient Was Seen By: Vitor Felix MD    **Please Note: This note may have been constructed using a voice recognition system  **

## 2021-07-11 NOTE — ASSESSMENT & PLAN NOTE
Lab Results   Component Value Date    HGBA1C 6 8 (H) 05/21/2021       Recent Labs     07/10/21  1609 07/10/21  2107 07/11/21  0537 07/11/21  1106   POCGLU 143* 122 152* 133       Blood Sugar Average: Last 72 hrs:  (P) 139 [id number]

## 2021-07-12 VITALS
HEART RATE: 86 BPM | WEIGHT: 300 LBS | SYSTOLIC BLOOD PRESSURE: 134 MMHG | OXYGEN SATURATION: 95 % | DIASTOLIC BLOOD PRESSURE: 71 MMHG | TEMPERATURE: 99 F | RESPIRATION RATE: 18 BRPM | BODY MASS INDEX: 36.53 KG/M2 | HEIGHT: 76 IN

## 2021-07-12 PROBLEM — N17.9 AKI (ACUTE KIDNEY INJURY) (HCC): Status: RESOLVED | Noted: 2021-07-08 | Resolved: 2021-07-12

## 2021-07-12 LAB
GLUCOSE SERPL-MCNC: 170 MG/DL (ref 65–140)
HCT VFR BLD AUTO: 24.8 % (ref 36.5–49.3)
HGB BLD-MCNC: 7.8 G/DL (ref 12–17)

## 2021-07-12 PROCEDURE — 85014 HEMATOCRIT: CPT | Performed by: INTERNAL MEDICINE

## 2021-07-12 PROCEDURE — 85018 HEMOGLOBIN: CPT | Performed by: INTERNAL MEDICINE

## 2021-07-12 PROCEDURE — 82948 REAGENT STRIP/BLOOD GLUCOSE: CPT

## 2021-07-12 RX ORDER — HYDROCODONE BITARTRATE AND ACETAMINOPHEN 5; 325 MG/1; MG/1
1 TABLET ORAL EVERY 6 HOURS PRN
Qty: 20 TABLET | Refills: 0 | Status: SHIPPED | OUTPATIENT
Start: 2021-07-12 | End: 2021-07-19

## 2021-07-12 RX ORDER — ALLOPURINOL 100 MG/1
200 TABLET ORAL DAILY
Qty: 30 TABLET | Refills: 0 | Status: SHIPPED | OUTPATIENT
Start: 2021-07-13 | End: 2021-09-15

## 2021-07-12 RX ORDER — CYCLOBENZAPRINE HCL 5 MG
5 TABLET ORAL 3 TIMES DAILY PRN
Qty: 45 TABLET | Refills: 0 | Status: SHIPPED | OUTPATIENT
Start: 2021-07-12 | End: 2021-08-16

## 2021-07-12 RX ADMIN — INSULIN LISPRO 1 UNITS: 100 INJECTION, SOLUTION INTRAVENOUS; SUBCUTANEOUS at 06:06

## 2021-07-12 RX ADMIN — PANTOPRAZOLE SODIUM 40 MG: 40 TABLET, DELAYED RELEASE ORAL at 06:06

## 2021-07-12 RX ADMIN — ASPIRIN 81 MG CHEWABLE TABLET 81 MG: 81 TABLET CHEWABLE at 08:41

## 2021-07-12 RX ADMIN — DOCUSATE SODIUM 100 MG: 100 CAPSULE, LIQUID FILLED ORAL at 08:43

## 2021-07-12 RX ADMIN — ALLOPURINOL 200 MG: 100 TABLET ORAL at 08:43

## 2021-07-12 NOTE — ASSESSMENT & PLAN NOTE
· Presented with hypertensive urgency likely as a result of not taking BP meds- improved  · restarted metoprolol  DC hyzaar and hctz on discharge

## 2021-07-12 NOTE — ASSESSMENT & PLAN NOTE
Due to RP hematoma   Lab Results   Component Value Date    HGB 7 8 (L) 07/12/2021    HGB 7 8 (L) 07/11/2021    HGB 9 1 (L) 07/10/2021    HGB 9 7 (L) 07/09/2021   hgb slightly dropped, likely hemodilution   S/p embolization by IR of bleeding L2 artery  Hb dropped slightly   Repeat hct today stable

## 2021-07-12 NOTE — ASSESSMENT & PLAN NOTE
· Likely due to uncontrolled HTN in the setting of lymph node biopsy  · S/p IR embolization of L2 artery  · Repeat CT abdomen was ordered on 7/10 due to recurrence of severe pain  Needed 2 doses of dilaudid and 1 dose of flexeril  CT abdomen reported as stable size hematoma    He continued to have pain and I discussed repeat CT abdomen report with IR as Hb dropped further on 7/11  Per IR, hematoma size was stable and plan to monitor with repeat hct  Today Hct remains stable and pain is minimal

## 2021-07-12 NOTE — DISCHARGE SUMMARY
[address] [facility]  Discharge- Dave Crow 1955, 60 y o  male MRN: [id number]  Unit/Bed#: CW2 211-02 Encounter: [id number]  Primary Care Provider: Lele Escobar MD   Date and time admitted to hospital: 7/8/2021  8:16 AM    * Retroperitoneal hematoma  Assessment & Plan  · Likely due to uncontrolled HTN in the setting of lymph node biopsy  · S/p IR embolization of L2 artery  · Repeat CT abdomen was ordered on 7/10 due to recurrence of severe pain  Needed 2 doses of dilaudid and 1 dose of flexeril  CT abdomen reported as stable size hematoma  He continued to have pain and I discussed repeat CT abdomen report with IR as Hb dropped further on 7/11  Per IR, hematoma size was stable and plan to monitor with repeat hct  Today Hct remains stable and pain is minimal    FINA (acute kidney injury) (HCC)-resolved as of 7/12/2021  Assessment & Plan  Lab Results   Component Value Date    CREATININE 1 07 07/11/2021    CREATININE 1 31 (H) 07/10/2021    CREATININE 1 73 (H) 07/09/2021   · Baseline creatinine is 1 0, currently 1 73- initially felt to be secondary to prerenal azotemia but also could be secondary to RP bleed/ABLA, ARB and HCTZ use, IV contrast   · Was on IV hydration- NSS @100/hr - ivf has been stopped  Renal following  Appreciate follow up  · Continue to Hold hyzaar    · Add urinary retention protocol  · Voiding well with no urinary retention   · Avoid nephrotoxic drugs and hypotension  · Discharge recommendations from nephrology reviewed/discussed - DC hyzaar on Dc   Will need outpt follow up with nephrology in 2 weeks repeat bmp prior to visit  · allopurinol dose increased as per renal  · Cr coming down - 1 07 on 7/11    Acute blood loss anemia  Assessment & Plan  Due to RP hematoma   Lab Results   Component Value Date    HGB 7 8 (L) 07/12/2021    HGB 7 8 (L) 07/11/2021    HGB 9 1 (L) 07/10/2021    HGB 9 7 (L) 07/09/2021   hgb slightly dropped, likely hemodilution   S/p embolization by IR of bleeding L2 artery  Hb dropped slightly  Repeat hct today stable    Mesenteric lymphadenopathy  Assessment & Plan  · Screening EGD and colonoscopy have been unremarkable  · S/p lymph node biopsy  · Outpatient follow up with oncology for results of biopsy    Hypertension  Assessment & Plan  · Presented with hypertensive urgency likely as a result of not taking BP meds- improved  · restarted metoprolol  DC hyzaar and hctz on discharge    Diabetes 1 5, managed as type 2 [facility])  Assessment & Plan    Lab Results   Component Value Date    HGBA1C 6 8 (H) 05/21/2021   · hold oral agents  · diabetic diet  · fingersticks QID with sliding scale coverage  · Blood sugars controlled  · Restart home meds on discharge      Medical Problems     Resolved Problems  Date Reviewed: 7/12/2021        Resolved    FINA [facility]) ([facility] ) 7/12/2021     Resolved by  Imani Gipson MD              Discharging Physician / Practitioner: Imani Gipson MD  PCP: Lele Escobar MD  Admission Date:   Admission Orders (From admission, onward)     Ordered        07/09/21 1212  Inpatient Admission  Once         07/08/21 1940  Place in Observation  Once                   Discharge Date: 07/12/21    Consultations During Hospital Stay:  · Nephrology  · IR  ·     Procedures Performed:   · L2 artery embolization by IR    Significant Findings / Test Results:   Active bleeding from a left lumbar artery, with associated moderate left retroperitoneal hemorrhage    Incidental Findings:   ·     Test Results Pending at Discharge (will require follow up):    · Mesenteric LN biopsy report     Outpatient Tests Requested:  · Bmp as per nephrology before appointment with them    Complications:  none    Reason for Admission: retroperitoneal hematoma    Hospital Course:   Dave Crow is a 60 y o  male patient who originally presented to outpatient IR for LN biopsy of a para-aortic LN but he was found to have retroperitoneal hematoma post procedure  He underwent L2 lumbar arteriography which showed active bleeding  This was coil embolized  He was monitored - repeat CT abdomen was done due to recurrence of pain which showed stable size hematoma  Discussed repeat CT abdomen with IR since his pain was not better and Hb dropped further - hematoma size was stable and plan to monitor as discussed with IR  His Hb is stable today and pain is minimal   After discussion with patient in detail, plan was to discharge him home but was told to come to ER if he has recurrence of pain or any other signs of bleeding or blood loss  - advised limiting activity at home  He will follow up with his oncologist for biopsy report  FINA - was felt to be secondary to prerenal azotemia but also could be secondary to RP bleed/ABLA, ARB and HCTZ use, IV contrast, hypotensive episode  He was seen by renal - Cr did come down with ivf and holding ARB and HCTZ  Nephrology recommended to hold ARB, HCTZ on discharge and follow up with them in 2 weeks with repeat BMP at that time  Allopurinol dose increased per renal     Please see above list of diagnoses and related plan for additional information  Condition at Discharge: stable    Discharge Day Visit / Exam:   Subjective:  Pain is minimal  No chest pain or dyspnea    Exam    Vitals and nursing note reviewed  Constitutional:       Appearance: He is well-developed  He is obese  Cardiovascular:      Rate and Rhythm: Normal rate and regular rhythm       Heart sounds: No murmur heard  Pulmonary:      Effort: Pulmonary effort is normal  No respiratory distress       Breath sounds: Normal breath sounds  Abdominal:      General: Bowel sounds are normal       Palpations: Abdomen is soft       Tenderness: There is abdominal tenderness (minimal tenderness at left lower back area)  Musculoskeletal:      Cervical back: Neck supple  Skin:     General: Skin is warm and dry     Neurological:      General: No focal deficit present       Mental Status: He is alert and oriented to person, place, and time  Psychiatric:         Mood and Affect: Mood normal        Vitals: Blood Pressure: 134/71 (07/12/21 0709)  Pulse: 86 (07/12/21 0709)  Temperature: 99 °F (37 2 °C) (07/12/21 0709)  Temp Source: Oral (07/11/21 0731)  Respirations: 18 (07/12/21 0709)  Height: 6' 4" (193 cm) (07/08/21 0825)  Weight - Scale: 136 kg (300 lb) (07/08/21 0825)  SpO2: 95 % (07/12/21 0709)      Discussion with Family:     Discharge instructions/Information to patient and family:   See after visit summary for information provided to patient and family  Provisions for Follow-Up Care:  See after visit summary for information related to follow-up care and any pertinent home health orders  Disposition:   Home    Planned Readmission: no     Discharge Statement:  I spent 32 minutes discharging the patient  This time was spent on the day of discharge  I had direct contact with the patient on the day of discharge  Greater than 50% of the total time was spent examining patient, answering all patient questions, arranging and discussing plan of care with patient as well as directly providing post-discharge instructions  Additional time then spent on discharge activities  Discharge Medications:  See after visit summary for reconciled discharge medications provided to patient and/or family        **Please Note: This note may have been constructed using a voice recognition system**

## 2021-07-12 NOTE — ASSESSMENT & PLAN NOTE
Lab Results   Component Value Date    HGBA1C 6 8 (H) 05/21/2021   · hold oral agents  · diabetic diet  · fingersticks QID with sliding scale coverage  · Blood sugars controlled  · Restart home meds on discharge

## 2021-07-12 NOTE — ASSESSMENT & PLAN NOTE
Lab Results   Component Value Date    CREATININE 1 07 07/11/2021    CREATININE 1 31 (H) 07/10/2021    CREATININE 1 73 (H) 07/09/2021   · Baseline creatinine is 1 0, currently 1 73- initially felt to be secondary to prerenal azotemia but also could be secondary to RP bleed/ABLA, ARB and HCTZ use, IV contrast   · Was on IV hydration- NSS @100/hr - ivf has been stopped  Renal following  Appreciate follow up  · Continue to Hold hyzaar    · Add urinary retention protocol  · Voiding well with no urinary retention   · Avoid nephrotoxic drugs and hypotension  · Discharge recommendations from nephrology reviewed/discussed - DC hyzaar on Dc   Will need outpt follow up with nephrology in 2 weeks repeat bmp prior to visit  · allopurinol dose increased as per renal  · Cr coming down - 1 07 on 7/11

## 2021-07-13 ENCOUNTER — TELEPHONE (OUTPATIENT)
Dept: NEPHROLOGY | Facility: CLINIC | Age: 66
End: 2021-07-13

## 2021-07-13 NOTE — UTILIZATION REVIEW
Notification of Discharge   This is a Notification of Discharge from our facility [address]  Please be advised that this patient has been discharge from our facility  Below you will find the admission and discharge date and time including the patients disposition  UTILIZATION REVIEW CONTACT:  Remedios Flores  Utilization   Network Utilization Review Department  Phone: [phone] x carefully listen to the prompts  All voicemails are confidential   Email: [email or web address]     PHYSICIAN ADVISORY SERVICES:  FOR [id number] REVIEW - MEDICAL NECESSITY DENIAL  Phone: [phone]  Fax: [phone]  Email: [email or web address]     PRESENTATION DATE: 7/8/2021  8:16 AM  OBERVATION ADMISSION DATE:   INPATIENT ADMISSION DATE: 7/9/21 12:12 PM   DISCHARGE DATE: 7/12/2021 11:46 AM  DISPOSITION: Home/Self Care Home/Self Care      IMPORTANT INFORMATION:  Send all requests for admission clinical reviews, approved or denied determinations and any other requests to dedicated fax number below belonging to the campus where the patient is receiving treatment   List of dedicated fax numbers:  [address] [place] [address] DENIALS (Administrative/Medical Necessity) [phone]   [address] [place] [address] (Maternity/NICU/Pediatrics) [phone]   Joselo Daugherty [phone]   Felix Mims [phone]   Gianfranco Gallo [phone]   [address] [phone]   [facility]  [phone]   [address] [facility] [address]  [address] [facility] [address] [facility] [phone]

## 2021-07-13 NOTE — TELEPHONE ENCOUNTER
----- Message from Radha Garcia MD sent at 7/10/2021  9:17 AM EDT -----  Regarding: Cleo Noland discharge  Please schedule the patient for hospital discharge follow-up for acute kidney injury to be seen in 2 weeks  Please send the patient orders for renal function panel prior to the visit to be done the week of 7/19          Jonathan zamora

## 2021-07-13 NOTE — TELEPHONE ENCOUNTER
Left voicemail asking patient to call the office back to schedule hospital follow up  Also have provided patient with [address] phone number as well as patient resides in [facility]

## 2021-07-14 ENCOUNTER — TELEPHONE (OUTPATIENT)
Dept: INTERVENTIONAL RADIOLOGY/VASCULAR | Facility: CLINIC | Age: 66
End: 2021-07-14

## 2021-07-14 NOTE — TELEPHONE ENCOUNTER
Patient was seen Thursday for a lymph node biopsy with subsequent bleed and embolization  Patient was discharged in stable condition on Friday  Called today to discuss no bowel movement for 1 week  He has taken stool softeners and suppositories without relief  His mobility has decreased due to continued pain of right hip  He was prescribed Norco and flexeril by internal medicine at time of discharge  Encouraged patient to ambulate as much as possible  Encouraged him to drink plenty of fluids and try and decrease pain medication  Gave patient central scheduling number and my office number to call back with any questions or concerns  Did encourage patient to report to ED if he begins to develop vomiting, bloating, severe abdominal pain with continued constipation       Michael Hloguin PA-C

## 2021-07-16 ENCOUNTER — OFFICE VISIT (OUTPATIENT)
Dept: HEMATOLOGY ONCOLOGY | Facility: CLINIC | Age: 66
End: 2021-07-16
Payer: COMMERCIAL

## 2021-07-16 ENCOUNTER — TELEPHONE (OUTPATIENT)
Dept: NEPHROLOGY | Facility: CLINIC | Age: 66
End: 2021-07-16

## 2021-07-16 VITALS
SYSTOLIC BLOOD PRESSURE: 118 MMHG | OXYGEN SATURATION: 99 % | RESPIRATION RATE: 18 BRPM | DIASTOLIC BLOOD PRESSURE: 72 MMHG | HEIGHT: 76 IN | HEART RATE: 91 BPM | TEMPERATURE: 97.2 F | WEIGHT: 306.5 LBS | BODY MASS INDEX: 37.32 KG/M2

## 2021-07-16 DIAGNOSIS — N17.9 ACUTE KIDNEY INJURY (HCC): Primary | ICD-10-CM

## 2021-07-16 DIAGNOSIS — D64.9 ANEMIA, UNSPECIFIED TYPE: ICD-10-CM

## 2021-07-16 DIAGNOSIS — C82.93 FOLLICULAR LYMPHOMA OF INTRA-ABDOMINAL LYMPH NODES, UNSPECIFIED FOLLICULAR LYMPHOMA TYPE (HCC): Primary | ICD-10-CM

## 2021-07-16 DIAGNOSIS — L03.90 WOUND CELLULITIS: ICD-10-CM

## 2021-07-16 DIAGNOSIS — T14.8XXA HEMATOMA: ICD-10-CM

## 2021-07-16 PROCEDURE — 99215 OFFICE O/P EST HI 40 MIN: CPT | Performed by: INTERNAL MEDICINE

## 2021-07-16 NOTE — TELEPHONE ENCOUNTER
I called and left a message on machine for patient stating that I schedule him for a hospital follow up on Wednesday 9/15/2021 @ 2:30pm with Dr Lawson Ramirez  I also stating that the patient will need to have a BMP done the first week in September  I also explained that I was adding him to our wait just in case something open's up sooner  Please place a BMP order in the system for the patient   Larissa Victor,

## 2021-07-16 NOTE — PROGRESS NOTES
HEMATOLOGY / ONCOLOGY CLINIC NOTE    Primary Care Provider: Hunter Kiran MD  Referring Provider:    MRN: [id number]  : 1955    Reason for Encounter:  Chief Complaint   Patient presents with    Follow-up     Malignant neoplasm of mesentery          History of Hematology / Oncology Illness:     Jemal Ny is a 60 y o  male who came in  to establish care with oncology    1, follicular lymphoma    - incidentally diagnosed while doing imaging for aortic aneurysm, showed retroperitoneum and mesenteric lymphadenopathy, largest lymph node about 3 cm, status post biopsy showed follicular lymphoma; grade 1-2;   - 2021 :  Developed a big hematoma post biopsy  Hospitalized  Hemoglobin dropped to 7 8 then stable  Assessment / Plan:      1  Follicular lymphoma of intra-abdominal lymph nodes, unspecified follicular lymphoma type ([facility] [address] )    - discussed with patient regarding diagnosis, made patient aware that this is an indolent lymphoma, staging is unclear, will proceed with PET-CT  - will follow patient after, management will be watchful waiting versus treatment  Made patient aware regardless this will not change survival       - NM PET CT skull base to mid thigh; Future  - CBC and differential; Future  - Comprehensive metabolic panel; Future  - Iron, TIBC and Ferritin Panel; Future    2  Wound cellulitis  - for the past 2 weeks patient developed right lower extremity on healing wound and cellulitis  - Ambulatory referral to Wound Care; Future    3  Anemia, unspecified type  - is likely due to blood loss  - Iron, TIBC and Ferritin Panel; Future      4   Hematoma  - as above          Made patient aware regarding side effects of chemotherapy, including but not limited to fatigue cytopenia, increased risk for infection, potential kidney, liver injuries neuropathies et al    Made patient aware to call MD or go to ED for any fever,  Chills, bleeding, easy bruise, unhealed wound, chest pain, abdominal pain et al                       minutes were spent face to face with patient with greater than 50% of the time spent in counseling or coordination of care including discussions of treatment instructions  All of the patient's questions were answered to their satisfactory during this discussion  Advised pt to call if there is any further questions  Interval History:       7/16/2021 :  Patient came in follow-up, recently hospitalized for hematoma  Condition has been stable  No other active bleeding  Subjective patient has no constitutional symptoms, no subjective palpable lumps bumps reported        Problem list:     Patient Active Problem List   Diagnosis    Diabetes 1 5, managed as type 2 ([facility] [address] )    Hypertension    Hypercholesteremia    Hammer toe of right foot    Stasis dermatitis of both legs    [facility]    Gout    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[facility] )    Obesity with body mass index 30 or greater    Type 2 diabetes mellitus (HCC)    Retroperitoneal hematoma    Mesenteric lymphadenopathy    Acute blood loss anemia    Heart murmur         PHYSICIAL EXAMINATION:     Vital Signs:   /72   Pulse 91   Temp (!) 97 2 °F (36 2 °C)   Resp 18   Ht 6' 4" (1 93 m)   Wt (!) 139 kg (306 lb 8 oz)   SpO2 99%   BMI 37 31 kg/m²   Body surface area is 2 66 meters squared     Ht Readings from Last 3 Encounters:   07/16/21 6' 4" (1 93 m)   07/08/21 6' 4" (1 93 m)   06/28/21 6' 4" (1 93 m)       Wt Readings from Last 3 Encounters:   07/16/21 (!) 139 kg (306 lb 8 oz)   07/08/21 136 kg (300 lb)   06/28/21 136 kg (299 lb 9 7 oz)        Temp Readings from Last 3 Encounters:   07/16/21 (!) 97 2 °F (36 2 °C)   07/12/21 99 °F (37 2 °C)   06/28/21 97 7 °F (36 5 °C) (Temporal)        BP Readings from Last 3 Encounters:   07/16/21 118/72   07/12/21 134/71   06/28/21 126/82         Pulse Readings from Last 3 Encounters:   07/16/21 91   07/12/21 86   06/28/21 67       No palpable lymphadenopathy, right lower extremity is wrapped, no other major findings on examination      GEN: Alert, awake oriented x3, in no acute distress  HEENT- No pallor, icterus, cyanosis, no oral mucosal lesions,   LAD - no palpable cervical, clavicle, axillary, inguinal LAD  Heart- normal S1 S2, regular rate and rhythm, No murmur, rubs  Lungs- decreased breathing sound bilateral    Abdomen- soft, Non tender, bowel sounds present  Extremities- No cyanosis, clubbing, edema  Neuro- No focal neurological deficit           PAST MEDICAL HISTORY:   has a past medical history of Diabetes mellitus ([facility] [address] ), High cholesterol, and Hypertension  PAST SURGICAL HISTORY:   has a past surgical history that includes Toe Osteotomy (Right, 3/14/2017); Incision and drainage of wound (Right, 10/5/2016); PILONIDAL CYST EXCISION; pr repair of hammertoe,one (Right, 2/19/2019); Toe Osteotomy (Left, 11/24/2020); Bunionectomy (Right, 11/24/2020); Tonsillectomy (1963); IR biopsy lymph node (7/8/2021); and IR embolization (specify vessel or site) (7/8/2021)      CURRENT MEDICATIONS:   Current Outpatient Medications   Medication Sig Dispense Refill    allopurinol (ZYLOPRIM) 100 mg tablet Take 2 tablets (200 mg total) by mouth daily 30 tablet 0    aspirin 81 mg chewable tablet Chew 81 mg daily      cyclobenzaprine (FLEXERIL) 5 mg tablet Take 1 tablet (5 mg total) by mouth 3 (three) times a day as needed for muscle spasms for up to 15 days 45 tablet 0    HYDROcodone-acetaminophen (NORCO) 5-325 mg per tablet Take 1 tablet by mouth every 6 (six) hours as needed for pain for up to 5 daysMax Daily Amount: 4 tablets 20 tablet 0    metFORMIN (GLUCOPHAGE) 500 mg tablet Take 1,000 mg by mouth 2 (two) times a day with meals      Multiple Vitamin (multivitamin) tablet Take 1 tablet by mouth daily      pantoprazole (PROTONIX) 40 mg tablet Take 1 tablet (40 mg total) by mouth daily 30 tablet 2    simvastatin (ZOCOR) 40 mg tablet Take 40 mg by mouth daily at bedtime       No current facility-administered medications for this visit  [unfilled]    SOCIAL HISTORY:   reports that he has never smoked  He has never used smokeless tobacco  He reports that he does not drink alcohol and does not use drugs  FAMILY HISTORY:  family history includes Cancer in his father  ALLERGIES:  is allergic to lisinopril  REVIEW OF SYSTEMS:  Please note that a 14-point review of systems was performed to include Constitutional, HEENT, Respiratory, CVS, GI, , Musculoskeletal, Integumentary, Neurologic, Rheumatologic, Endocrinologic, Psychiatric, Lymphatic, and Hematologic/Oncologic systems were reviewed and are negative unless otherwise stated in HPI  Positive and negative findings pertinent to this evaluation are incorporated into the history of present illness              LAB:  Lab Results   Component Value Date    WBC 8 99 07/10/2021    HGB 7 8 (L) 07/12/2021    HCT 24 8 (L) 07/12/2021    MCV 88 07/10/2021     07/10/2021     Lab Results   Component Value Date     10/06/2015    SODIUM 136 07/11/2021    K 4 3 07/11/2021     07/11/2021    CO2 27 07/11/2021    ANIONGAP 7 10/06/2015    AGAP 4 07/11/2021    BUN 26 (H) 07/11/2021    CREATININE 1 07 07/11/2021    GLUC 129 07/11/2021    GLUF 137 (H) 05/21/2021    CALCIUM 9 0 07/11/2021    AST 29 05/21/2021    ALT 48 05/21/2021    ALKPHOS 65 05/21/2021    PROT 6 8 10/06/2015    TP 7 4 05/21/2021    BILITOT 0 44 10/06/2015    TBILI 0 30 05/21/2021    EGFR 73 07/11/2021       CBC with diff:   Results from last 7 days   Lab Units 07/12/21  0454 07/11/21  0613 07/10/21  0216   WBC Thousand/uL  --   --  8 99   HEMOGLOBIN g/dL 7 8* 7 8* 9 1*   HEMATOCRIT % 24 8* 25 0* 28 1*   MCV fL  --   --  88   PLATELETS Thousands/uL  --   --  157   MCH pg  --   --  28 3   MCHC g/dL  --   --  32 4   RDW %  --   --  14 2   MPV fL  --   --  10 4       CMP:  Results from last 7 days   Lab Units 07/11/21  0613 07/10/21  0217 POTASSIUM mmol/L 4 3 4 1   CHLORIDE mmol/L 105 105   CO2 mmol/L 27 25   BUN mg/dL 26* 32*   CREATININE mg/dL 1 07 1 31*   CALCIUM mg/dL 9 0 9 1   EGFR ml/min/1 73sq m 73 57       IMAGING:  NM PET CT skull base to mid thigh    (Results Pending)     Echo complete with contrast if indicated    Result Date: 2021  Narrative: [facility], [address] [facility] [phone] Transthoracic Echocardiogram 2D, M-mode, Doppler, and Color Doppler Study date:  2021 Patient: Micheline Pugh MR number: [id number] Account number: [de-identified] : 1955 Age: 60 years Gender: Male Status: Outpatient Location: [facility] Height: 76 in Weight: 298 3 lb BP: 126/ 82 mmHg Indications: AAA with out rupture    Diagnoses: I71 2 - Thoracic aortic aneurysm, without rupture Sonographer:  Scheryl Brunner, RCS Primary Physician:  Marcello Norton MD Referring Physician:  Marcello Norton MD Group:  [facility] Interpreting Physician:  Anni Cavazos MD SUMMARY LEFT VENTRICLE: Systolic function was normal  Ejection fraction was estimated to be 60 %  There were no regional wall motion abnormalities  Concentric hypertrophy was present  Doppler parameters were consistent with abnormal left ventricular relaxation (grade 1 diastolic dysfunction)  RIGHT VENTRICLE: The ventricle was mildly dilated  Systolic function was normal  LEFT ATRIUM: The atrium was mildly dilated  RIGHT ATRIUM: The atrium was mildly dilated  MITRAL VALVE: There was moderate annular calcification  TRICUSPID VALVE: There was trace regurgitation  Pulmonary artery systolic pressure was within the normal range  PULMONIC VALVE: There was trace regurgitation  AORTA: The root exhibited upper limit of normal size (3 8 cm)  HISTORY: PRIOR HISTORY: Risk factors: hypertension, oral hypoglycemic-treated diabetes, and medication-treated hypercholesterolemia   PROCEDURE: [facility]  [place]  This was a routine study  The transthoracic approach was used  The study included complete 2D imaging, M-mode, complete spectral Doppler, and color Doppler  The heart rate was 66 bpm, at the start of the study  This was a technically difficult study  LEFT VENTRICLE: Size was normal  Systolic function was normal  Ejection fraction was estimated to be 60 %  There were no regional wall motion abnormalities  Concentric hypertrophy was present  No evidence of apical thrombus  DOPPLER: Doppler parameters were consistent with abnormal left ventricular relaxation (grade 1 diastolic dysfunction)  RIGHT VENTRICLE: The ventricle was mildly dilated  Systolic function was normal  Wall thickness was normal  LEFT ATRIUM: The atrium was mildly dilated  RIGHT ATRIUM: The atrium was mildly dilated  MITRAL VALVE: There was moderate annular calcification  There was calcification of the anterior leaflet  There was normal leaflet separation  DOPPLER: The transmitral velocity was within the normal range  There was no evidence for stenosis  There was no significant regurgitation  AORTIC VALVE: The valve was not visualized well enough to rule out a bicuspid morphology  Leaflets exhibited mildly to moderately increased thickness and normal cuspal separation  DOPPLER: Transaortic velocity was within the normal range  There was no evidence for stenosis  There was no significant regurgitation  TRICUSPID VALVE: The valve structure was normal  There was normal leaflet separation  DOPPLER: The transtricuspid velocity was within the normal range  There was no evidence for stenosis  There was trace regurgitation  Pulmonary artery systolic pressure was within the normal range  PULMONIC VALVE: Leaflets exhibited normal thickness, no calcification, and normal cuspal separation  DOPPLER: The transpulmonic velocity was within the normal range  There was trace regurgitation  PERICARDIUM: There was no pericardial effusion   The pericardium was normal in appearance  AORTA: The root exhibited upper limit of normal size (3 8 cm)  SYSTEMIC VEINS: IVC: The inferior vena cava was normal in size  SYSTEM MEASUREMENT TABLES 2D %FS: 46 54 % Ao Diam: 3 83 cm EDV(Teich): 78 78 ml EF(Teich): 78 29 % ESV(Teich): 17 11 ml IVSd: 1 43 cm LA Area: 21 83 cm2 LA Diam: 4 18 cm LVEDV MOD A4C: 107 4 ml LVEF MOD A4C: 48 1 % LVESV MOD A4C: 55 74 ml LVIDd: 4 2 cm LVIDs: 2 25 cm LVLd A4C: 8 22 cm LVLs A4C: 7 29 cm LVOT Diam: 2 72 cm LVPWd: 1 46 cm RA Area: 23 57 cm2 RVIDd: 4 33 cm RWT: 0 7 SV MOD A4C: 51 66 ml SV(Teich): 61 67 ml CW AV Env  Ti: 283 16 ms AV MaxP 49 mmHg AV VTI: 50 96 cm AV Vmax: 2 52 m/s AV Vmean: 1 8 m/s AV meanP 64 mmHg TR MaxP 97 mmHg TR Vmax: 2 29 m/s MM TAPSE: 2 45 cm PW IAIN (VTI): 2 39 cm2 IAIN Vmax: 2 26 cm2 AVAI (VTI): 0 cm2/m2 AVAI Vmax: 0 cm2/m2 E': 0 07 m/s E/E': 11 39 LVOT Env  Ti: 304 47 ms LVOT VTI: 21 cm LVOT Vmax: 0 98 m/s LVOT Vmean: 0 69 m/s LVOT maxPG: 3 84 mmHg LVOT meanP 24 mmHg LVSI Dopp: 46 37 ml/m2 LVSV Dopp: 121 96 ml MV A [place]: 1 02 m/s MV Dec [place]: 2 51 m/s2 MV DecT: 309 55 ms MV E [place]: 0 78 m/s MV E/A Ratio: 0 76 MV PHT: 89 77 ms MVA By PHT: 2 45 cm2 [employer] Accredited Echocardiography Laboratory Prepared and electronically signed by Elzbieta Mesa MD Signed 2021 15:24:46     EGD    Result Date: 2021  Narrative:  [address] [place] [address] [phone] DATE OF SERVICE: 21 PHYSICIAN(S): Miah Chow MD - Attending Physician INDICATION: Abnormal CT of the chest, Lymphadenopathy, abdominal POST-OP DIAGNOSIS: See the impression below  PREPROCEDURE: Informed consent was obtained for the procedure, including sedation  Risks of perforation, hemorrhage, adverse drug reaction and aspiration were discussed  The patient was placed in the left lateral decubitus position   Patient was explained about the risks and benefits of the procedure  Risks including but not limited to bleeding, infection, and perforation were explained in detail  Also explained about less than 100% sensitivity with the exam and other alternatives  DETAILS OF PROCEDURE: Patient was taken to the procedure room where a time out was performed to confirm correct patient and correct procedure  The patient underwent monitored anesthesia care, which was administered by an anesthesia professional  The patient's blood pressure, heart rate, level of consciousness, respirations and oxygen were monitored throughout the procedure  The scope was advanced to the second part of the duodenum  Retroflexion was performed in the fundus  The patient experienced no blood loss  The procedure was not difficult  The patient tolerated the procedure well  There were no apparent complications  ANESTHESIA INFORMATION: ASA: III Anesthesia Type: IV Sedation with Anesthesia MEDICATIONS: No administrations occurring from 0813 to 0822 on 06/28/21 FINDINGS: The upper third of the esophagus, middle third of the esophagus and lower third of the esophagus appeared normal  Grade C esophagitis with mucosal breaks measuring 5 mm or more, continuous between folds, covering less than 75% of the circumference in the GE junction; performed cold forceps biopsy Mild, patchy erythematous and friable mucosa in the body of the stomach and antrum; performed cold forceps biopsy The duodenal bulb, 1st part of the duodenum and 2nd part of the duodenum appeared normal  Performed random biopsy   SPECIMENS: * No specimens in log *     Impression: LA grade C reflux esophagitis Mild erosive gastritis RECOMMENDATION: Follow up with PCP Await pathology Reflux precautions PPI course Weight loss  Damian Agrawal MD     Colonoscopy    Result Date: 6/28/2021  Narrative:  [address] [facility] [phone] DATE OF SERVICE: 6/28/21 PHYSICIAN(S): Damian Agrawal MD - Attending Physician INDICATION: Abnormal CT of the chest, Lymphadenopathy, abdominal, Screening for malignant neoplasm of colon Colonoscopy performed for a screening indication  POST-OP DIAGNOSIS: See the impression below  HISTORY: Prior colonoscopy: No prior colonoscopy  BOWEL PREPARATION: Magnesium/Sodium Sulfate (Miralax, Suprep) PREPROCEDURE: Informed consent was obtained for the procedure, including sedation  Risks including but not limited to bleeding, infection, perforation, adverse drug reaction and aspiration were explained in detail  Also explained about less than 100% sensitivity with the exam and other alternatives  The patient was placed in the left lateral decubitus position  DETAILS OF PROCEDURE: Patient was taken to the procedure room where a time out was performed to confirm correct patient and correct procedure  The patient underwent monitored anesthesia care, which was administered by an anesthesia professional  The patient's blood pressure, heart rate, level of consciousness, oxygen and respirations were monitored throughout the procedure  A digital rectal exam was performed  The scope was introduced through the anus and advanced to the cecum  Retroflexion was performed in the rectum  The quality of bowel preparation was evaluated using the Nilo Bowel Preparation Scale with scores of: right colon = 2, transverse colon = 2, left colon = 2  The total BBPS score was 6  Bowel prep was adequate  The patient experienced no blood loss  The procedure was not difficult  The patient tolerated the procedure well  There were no apparent complications   ANESTHESIA INFORMATION: ASA: III Anesthesia Type: IV Sedation with Anesthesia MEDICATIONS: No administrations occurring from 0813 to 0839 on 06/28/21 FINDINGS: Few small, mild scattered diverticula with no bleeding in the descending colon and sigmoid colon All observed locations appeared normal, including the cecum, ascending colon, hepatic flexure, transverse colon, splenic flexure, rectosigmoid and rectum  EVENTS: Procedure Events Event Event Time ENDO SCOPE OUT TIME 6/28/2021  8:38 AM SPECIMENS: ID Type Source Tests Collected by Time Destination 1 :  Tissue Duodenum TISSUE EXAM Kevin Willis MD 6/28/2021  8:23 AM  2 :  Tissue Stomach TISSUE EXAM Kevin Willis MD 6/28/2021  8:24 AM  3 : distal Tissue Esophagus TISSUE EXAM Kevin Willis MD 6/28/2021  8:33 AM  EQUIPMENT: [id number]     Impression: Diverticulosis RECOMMENDATION: High-fiber diet Await IR biopsy of lymph node Repeat screening colonoscopy in 10 years for colon cancer screening Maximo Marmolejo III, MD     CT abdomen pelvis wo contrast    Result Date: 7/10/2021  Narrative: CT ABDOMEN AND PELVIS WITHOUT IV CONTRAST INDICATION:   Abdominal pain, acute, nonlocalized h/o retroperitoneal bleed, recent IR procedure, recurring back pain  COMPARISON:  7/8/2021  TECHNIQUE:  CT examination of the abdomen and pelvis was performed without intravenous contrast   Axial, sagittal, and coronal 2D reformatted images were created from the source data and submitted for interpretation  Radiation dose length product (DLP) for this visit:  2460 01 mGy-cm   This examination, like all CT scans performed in the [address] [place] [address], was performed utilizing techniques to minimize radiation dose exposure, including the use of iterative reconstruction and automated exposure control  Enteric contrast was not administered  FINDINGS: ABDOMEN LOWER CHEST:  Interval development of small left pleural effusion with lower lobe and and lingular atelectasis  LIVER/BILIARY TREE:  Unremarkable  GALLBLADDER:  Vicarious excretion of contrast into the gallbladder lumen noted  No acute changes  SPLEEN:  Unremarkable  PANCREAS:  Unremarkable  ADRENAL GLANDS:  Unremarkable  KIDNEYS/URETERS:  No hydronephrosis or perinephric collection  Tiny nonobstructing calculus left lower pole    Anterior displacement of the left kidney secondary to subacute retroperitoneal hematoma stable  STOMACH AND BOWEL:  Unremarkable  APPENDIX:  A normal appendix was visualized  ABDOMINOPELVIC CAVITY:  Stable large left-sided retroperitoneal hematoma centered in the iliopsoas muscle with inflammatory changes in the left paracolic gutter extending into the pelvis  Gelfoam within the hemorrhage again noted  Minimal peritoneum with minimal presacral edema now identified  Retroperitoneal and pelvic lymphadenopathy stable  VESSELS:  Unremarkable for patient's age  PELVIS REPRODUCTIVE ORGANS:  Unremarkable for patient's age  URINARY BLADDER:  Digital contrast in the bladder noted with no perivesical inflammation  ABDOMINAL WALL/INGUINAL REGIONS:  Fat-containing inguinal hernias again identified with stable right greater than left inguinal lymph nodes noted  Post procedural changes in the right inguinal region noted  OSSEOUS STRUCTURES:  No acute fracture or destructive osseous lesion  Impression: 1  Redemonstration of large left retroperitoneal hematoma involving the iliopsoas muscle with no interval increase in size  No new hemorrhage identified  2   Stable retroperitoneal and pelvic lymphadenopathy  3   Small left pleural effusion with basilar atelectasis  Workstation performed: [id number]     CTA abdomen pelvis w wo contrast    Result Date: 7/9/2021  Narrative: CT ANGIOGRAM OF THE ABDOMEN AND PELVIS WITH AND WITHOUT IV CONTRAST INDICATION:  Recent left para-aortic lymph node biopsy with hypotension  COMPARISON: CT, dated 7/8/2021  CT, dated 6/18/2021  TECHNIQUE:  CT angiogram examination of the abdomen and pelvis was performed according to standard protocol  This examination, like all CT scans performed in the [facility], was performed utilizing techniques to minimize radiation dose exposure, including the use of iterative reconstruction and automated exposure control     Contrast as well as noncontrast images were obtained  Rad dose 4859 55 mGy-cm IV Contrast:  100 mL of iohexol (OMNIPAQUE) Enteric Contrast:  None  FINDINGS: VASCULAR STRUCTURES:  ABDOMINAL VASCULAR STRUCTURES:   AORTA: The abdominal aorta is normal in caliber  There are mild atherosclerotic calcifications  CELIAC ARTERY: The origin is normal in caliber  Branching anatomy is conventional   There is no atherosclerotic disease  SUPERIOR MESENTERIC ARTERY: Normal caliber, without atherosclerotic calcifications  INFERIOR MESENTERIC ARTERY: The ostia and visualized branches are normal  RIGHT RENAL ARTERY: The single renal artery is normal in caliber  LEFT RENAL ARTERY: The single renal artery is normal in caliber  LEFT LOWER EXTREMITY: LEFT COMMON ILIAC ARTERY: Normal in caliber, without atherosclerotic disease  LEFT INTERNAL ILIAC ARTERY: Mild atherosclerotic disease  Visualized branches are normal in caliber  LEFT EXTERNAL ILIAC ARTERY: Normal in caliber, without atherosclerotic disease  LEFT COMMON FEMORAL ARTERY: Normal in caliber, without atherosclerotic disease  RIGHT LOWER EXTREMITY: RIGHT COMMON ILIAC ARTERY: Mild calcific atherosclerotic disease  The vessel is normal in caliber  RIGHT INTERNAL ILIAC ARTERY: Visualized branches are normal, without atherosclerotic disease  RIGHT EXTERNAL ILIAC ARTERY: Normal in caliber, without atherosclerotic disease  RIGHT COMMON FEMORAL ARTERY: Mild calcific atherosclerotic disease  The vessel is normal in caliber  VENOUS: The iliocaval system is normal in caliber, without collateralization  There is the suggestion of active bleeding arising from a left lumbar artery, seen best on coronal series 500, image 145  There is an associated left moderate retroperitoneal hemorrhage  Locules of gas within the hemorrhage relate to Gelfoam administration  OTHER FINDINGS ABDOMEN LOWER CHEST:  No significant abnormality in the lung bases  LIVER/BILIARY TREE:  Unremarkable  GALLBLADDER:  No calcified gallstones   No pericholecystic inflammatory change  SPLEEN:  Unremarkable  Normal size  PANCREAS:  Unremarkable  ADRENAL GLANDS:  7 mm nodule in the right adrenal gland is technically too small to characterize, but statistically most likely to be a benign adenoma  KIDNEYS/URETERS:  No solid renal mass  No hydronephrosis  No urinary tract calculi  PELVIS REPRODUCTIVE ORGANS:  Unremarkable for patient's age  URINARY BLADDER:  Unremarkable  ADDITIONAL ABDOMINAL AND PELVIC STRUCTURES STOMACH AND BOWEL:  Unremarkable  ABDOMINOPELVIC CAVITY:  Redemonstrated are multiple enlarged para-aortic lymph nodes, stable from the prior examination 3 weeks ago  A reference periaortic lymph node measures 19 mm in short axis (series 3, image 74)  Additional enlarged lymph nodes extend through the iliac chain, with an additional right external iliac lymph node measuring 16 mm in short axis (series 3, image 136)  No ascites or free intraperitoneal air  ABDOMINAL WALL/INGUINAL REGIONS:  Unremarkable  OSSEOUS STRUCTURES:  No acute fracture or destructive osseous lesion  Impression: Active bleeding from a left lumbar artery, with associated moderate left retroperitoneal hemorrhage  This finding was identified prior to report generation, and the patient was subsequently taken to arteriography with embolization  Workstation performed: [id number]     IR biopsy lymph node    Result Date: 7/9/2021  Narrative: PROCEDURE: CT-guided left para-aortic lymph node biopsy STAFF: WESLEY Dalton  Elvin Savin: 1572 37 mGy-cm  This examination, like all CT scans performed in the [facility], was performed utilizing techniques to minimize radiation dose exposure, including the use of iterative reconstruction and automated exposure control  NUMBER OF IMAGES: Multiple  COMPLICATIONS: After the procedure, the patient developed hypertensive urgency  He subsequently developed left leg pain  He he began to develop hypotension    CT was obtained, showing left retroperitoneal hemorrhage  The patient was taken for subsequent embolization,  which showed active bleeding from a left lumbar L2 artery  MEDICATIONS: Versed 2 milligrams iv  Fentanyl 100 micrograms iv  Moderate sedation was monitored by radiology nursing staff  Monitoring of conscious sedation totaled 30 minutes  INDICATION: Concern for lymphoma  PROCEDURE: Under intermittent CT guidance, a 17-gauge coaxial needle was advanced into the left para-aortic lymph node  Under intermittent CT guidance, a total of 6 1 1 cm  core biopsies were obtained  Postprocedural CT was obtained  The tract with embolized with Gelfoam, and the coaxial needle was removed  FINDINGS: 1  Pre-procedural CT showed the dominant 2 cm left para-aortic lymph node  2   Intra-procedural CT confirmed good positioning of the biopsy needle within the left para-aortic lymph node  3   The in-room pathology team confirmed adequacy of samples  4   Post-procedural CT showed a small developing left retroperitoneal hematoma  Impression: CT-guided left para-aortic lymph node biopsy  Workstation performed: [id number]     CT abdomen pelvis w contrast    Result Date: 6/18/2021  Narrative: CT ABDOMEN AND PELVIS WITH IV CONTRAST INDICATION:   R59 0: Localized enlarged lymph nodes  Per prior report, retroperitoneal and mesenteric adenopathy and splenic lesions seen on CT of the chest   Chest CT obtained for thoracic aneurysm  No reported known history of malignancy  No prior surgery  COMPARISON:  5/28/2021 CTA of the chest TECHNIQUE:  CT examination of the abdomen and pelvis was performed  Axial, sagittal, and coronal 2D reformatted images were created from the source data and submitted for interpretation  Radiation dose length product (DLP) for this visit:  1842 mGy-cm     This examination, like all CT scans performed in the [facility], was performed utilizing techniques to minimize radiation dose exposure, including the use of iterative reconstruction and automated exposure control  IV Contrast:  100 mL of iohexol (OMNIPAQUE) Enteric Contrast:  Enteric contrast was not administered  FINDINGS: ABDOMEN LOWER CHEST:  No acute findings  Coronary artery and valve calcification  LIVER/BILIARY TREE:  Within normal limits  GALLBLADDER:  Within normal limits  SPLEEN:  Top normal in size  Preserved morphology  Scattered, ill-defined hypoattenuating nodules  Hilar splenule  PANCREAS:  Within normal limits  ADRENAL GLANDS:  Within normal limits  KIDNEYS/URETERS:  Small bilateral hypodense cortical lesions too small to characterize, statistically likely cysts  No acute findings  STOMACH AND BOWEL:  Within normal limits  APPENDIX:  Within normal limits  ABDOMINOPELVIC CAVITY:  Enlarged mesenteric nodes measure up to 3 5 cm short axis  Smaller, but still enlarged portacaval, retroperitoneal and bilateral iliac chain nodes measure up to 1 7 cm  Femoral nodes up to 1 8 cm  Nodes are bilateral and symmetric  None are necrotic or calcified  No abnormal fluid or air  VESSELS:  Atherosclerotic changes  No aneurysm or acute finding  PELVIS: REPRODUCTIVE ORGANS:  prostate and seminal vesicles within normal limits  URINARY BLADDER:  Within normal limits  ABDOMINAL WALL/INGUINAL REGIONS:  Within normal limits  OSSEOUS STRUCTURES:  No acute or suspicious findings  Impression: Findings similar to those reported on the recent CTA of the chest mesenteric greater than retroperitoneal lymphadenopathy and hypodense splenic lesions  A systemic process is favored, such as lymphoma and leukemia  Granulomatous disease such as sarcoid  is probably less likely given lack of chest lymphadenopathy  Workstation performed: [id number]     IR embolization (specify vessel or site)    Result Date: 7/9/2021  Narrative: PROCEDURE: 1   Real-time ultrasound-guided access of the right common femoral artery 2    Left lumbar L2 and L3 arteriography, with additional superselective catheterization of the left L2 lumbar artery 3  Coil embolization of active bleeding of the left lumbar L2 artery STAFF: WESLEY Dumont  CONTRAST: 30 mL Omnipaque intra-arterial  FLUOROSCOPY TIME: 11 4 minutes  NUMBER OF IMAGES: Multiple COMPLICATIONS: None  MEDICATIONS: Sedation was provided in the form of monitored anesthesia care by the Anesthesia service  Please see their associated records  INDICATION: Recent left para-aortic lymph node biopsy this morning  Interval CT showed left retroperitoneal hemorrhage  PROCEDURE: Real-time ultrasound was used to access the right common femoral artery  An image was stored in PACS  A sheath was placed  A wire and catheter was then used to catheterize the left lumbar L3 artery, and interval arteriography was performed  The catheter was then extended into the left L2 lumbar artery, and interval arteriography was performed  Based upon the results of arteriography, a microwire and microcatheter was extended into the distal left lumbar L2 artery, and interval arteriography was performed  Next, the decision was made to coil embolize the left lumbar L2 artery  This was done utilizing four 2 mm x 7 cm Kaz coils, followed by two 3 mm x 3 cm Kaz coils  Completion arteriography was performed  The wire and catheter were removed  Hemostasis was achieved at the right groin utilizing the Starclose vascular closure device  FINDINGS: 1   Real-time ultrasound showed the right common femoral artery be anechoic and freely compressible 2  Arteriography after catheterization of the left lumbar L3 artery failed to show active bleeding  3   Arteriography after catheterization of the left lumbar L2 artery showed an actively bleeding pseudoaneurysm from the distal artery  4   Arteriography after selective catheterization of the distal left lumbar L2 artery confirmed active bleeding   5   Arteriography after coil embolization of the left lumbar L2 artery showed resolution of flow in the artery  Impression: Coil embolization of an actively bleeding left lumbar L2 artery   Workstation performed: [id number]

## 2021-07-18 ENCOUNTER — HOSPITAL ENCOUNTER (OUTPATIENT)
Facility: HOSPITAL | Age: 66
Setting detail: OBSERVATION
Discharge: HOME | End: 2021-07-19
Attending: EMERGENCY MEDICINE | Admitting: FAMILY MEDICINE
Payer: COMMERCIAL

## 2021-07-18 DIAGNOSIS — K92.2 LOWER GI BLEED: Primary | ICD-10-CM

## 2021-07-18 DIAGNOSIS — D64.9 ANEMIA: ICD-10-CM

## 2021-07-18 DIAGNOSIS — K59.00 CONSTIPATION: ICD-10-CM

## 2021-07-18 LAB
ALBUMIN SERPL BCP-MCNC: 3.3 G/DL (ref 3.5–5)
ALP SERPL-CCNC: 85 U/L (ref 46–116)
ALT SERPL W P-5'-P-CCNC: 80 U/L (ref 12–78)
ANION GAP SERPL CALCULATED.3IONS-SCNC: 8 MMOL/L (ref 4–13)
AST SERPL W P-5'-P-CCNC: 68 U/L (ref 5–45)
BASOPHILS # BLD AUTO: 0.06 THOUSANDS/ΜL (ref 0–0.1)
BASOPHILS NFR BLD AUTO: 1 % (ref 0–1)
BILIRUB SERPL-MCNC: 1.02 MG/DL (ref 0.2–1)
BUN SERPL-MCNC: 19 MG/DL (ref 5–25)
CALCIUM ALBUM COR SERPL-MCNC: 10 MG/DL (ref 8.3–10.1)
CALCIUM SERPL-MCNC: 9.4 MG/DL (ref 8.3–10.1)
CHLORIDE SERPL-SCNC: 103 MMOL/L (ref 100–108)
CO2 SERPL-SCNC: 28 MMOL/L (ref 21–32)
CREAT SERPL-MCNC: 1.1 MG/DL (ref 0.6–1.3)
EOSINOPHIL # BLD AUTO: 0.41 THOUSAND/ΜL (ref 0–0.61)
EOSINOPHIL NFR BLD AUTO: 5 % (ref 0–6)
ERYTHROCYTE [DISTWIDTH] IN BLOOD BY AUTOMATED COUNT: 13.8 % (ref 11.6–15.1)
GFR SERPL CREATININE-BSD FRML MDRD: 71 ML/MIN/1.73SQ M
GLUCOSE SERPL-MCNC: 111 MG/DL (ref 65–140)
HCT VFR BLD AUTO: 25.8 % (ref 36.5–49.3)
HGB BLD-MCNC: 7.9 G/DL (ref 12–17)
HOLD SPECIMEN: NORMAL
IMM GRANULOCYTES # BLD AUTO: 0.11 THOUSAND/UL (ref 0–0.2)
IMM GRANULOCYTES NFR BLD AUTO: 1 % (ref 0–2)
LIPASE SERPL-CCNC: 118 U/L (ref 73–393)
LYMPHOCYTES # BLD AUTO: 0.61 THOUSANDS/ΜL (ref 0.6–4.47)
LYMPHOCYTES NFR BLD AUTO: 7 % (ref 14–44)
MCH RBC QN AUTO: 26.7 PG (ref 26.8–34.3)
MCHC RBC AUTO-ENTMCNC: 30.6 G/DL (ref 31.4–37.4)
MCV RBC AUTO: 87 FL (ref 82–98)
MONOCYTES # BLD AUTO: 0.81 THOUSAND/ΜL (ref 0.17–1.22)
MONOCYTES NFR BLD AUTO: 9 % (ref 4–12)
NEUTROPHILS # BLD AUTO: 6.64 THOUSANDS/ΜL (ref 1.85–7.62)
NEUTS SEG NFR BLD AUTO: 77 % (ref 43–75)
NRBC BLD AUTO-RTO: 0 /100 WBCS
PLATELET # BLD AUTO: 300 THOUSANDS/UL (ref 149–390)
PMV BLD AUTO: 9.9 FL (ref 8.9–12.7)
POTASSIUM SERPL-SCNC: 4.3 MMOL/L (ref 3.5–5.3)
PROT SERPL-MCNC: 7.9 G/DL (ref 6.4–8.2)
RBC # BLD AUTO: 2.96 MILLION/UL (ref 3.88–5.62)
SODIUM SERPL-SCNC: 139 MMOL/L (ref 136–145)
WBC # BLD AUTO: 8.64 THOUSAND/UL (ref 4.31–10.16)

## 2021-07-18 PROCEDURE — 99285 EMERGENCY DEPT VISIT HI MDM: CPT | Performed by: EMERGENCY MEDICINE

## 2021-07-18 PROCEDURE — 99285 EMERGENCY DEPT VISIT HI MDM: CPT

## 2021-07-18 PROCEDURE — 85025 COMPLETE CBC W/AUTO DIFF WBC: CPT | Performed by: EMERGENCY MEDICINE

## 2021-07-18 PROCEDURE — 80053 COMPREHEN METABOLIC PANEL: CPT | Performed by: EMERGENCY MEDICINE

## 2021-07-18 PROCEDURE — 83690 ASSAY OF LIPASE: CPT | Performed by: EMERGENCY MEDICINE

## 2021-07-18 PROCEDURE — 36415 COLL VENOUS BLD VENIPUNCTURE: CPT

## 2021-07-19 ENCOUNTER — APPOINTMENT (EMERGENCY)
Dept: CT IMAGING | Facility: HOSPITAL | Age: 66
End: 2021-07-19
Payer: COMMERCIAL

## 2021-07-19 VITALS
DIASTOLIC BLOOD PRESSURE: 59 MMHG | TEMPERATURE: 97.7 F | RESPIRATION RATE: 15 BRPM | SYSTOLIC BLOOD PRESSURE: 117 MMHG | HEART RATE: 83 BPM | OXYGEN SATURATION: 95 %

## 2021-07-19 DIAGNOSIS — K64.8 INTERNAL HEMORRHOID, BLEEDING: Primary | ICD-10-CM

## 2021-07-19 DIAGNOSIS — R74.01 TRANSAMINITIS: ICD-10-CM

## 2021-07-19 PROBLEM — J90 PLEURAL EFFUSION: Status: ACTIVE | Noted: 2021-07-19

## 2021-07-19 PROBLEM — R93.5 ABNORMAL CT OF THE ABDOMEN: Status: ACTIVE | Noted: 2021-07-19

## 2021-07-19 PROBLEM — K92.2 GI BLEED: Status: ACTIVE | Noted: 2021-07-19

## 2021-07-19 LAB
ABO GROUP BLD: NORMAL
ALBUMIN SERPL BCP-MCNC: 3.1 G/DL (ref 3.5–5)
ALP SERPL-CCNC: 78 U/L (ref 46–116)
ALT SERPL W P-5'-P-CCNC: 73 U/L (ref 12–78)
ANION GAP SERPL CALCULATED.3IONS-SCNC: 11 MMOL/L (ref 4–13)
APTT PPP: 41 SECONDS (ref 23–37)
AST SERPL W P-5'-P-CCNC: 54 U/L (ref 5–45)
ATRIAL RATE: 85 BPM
ATRIAL RATE: 86 BPM
BASOPHILS # BLD AUTO: 0.05 THOUSANDS/ΜL (ref 0–0.1)
BASOPHILS # BLD AUTO: 0.08 THOUSANDS/ΜL (ref 0–0.1)
BASOPHILS NFR BLD AUTO: 1 % (ref 0–1)
BASOPHILS NFR BLD AUTO: 1 % (ref 0–1)
BILIRUB DIRECT SERPL-MCNC: 0.22 MG/DL (ref 0–0.2)
BILIRUB SERPL-MCNC: 0.87 MG/DL (ref 0.2–1)
BLD GP AB SCN SERPL QL: NEGATIVE
BUN SERPL-MCNC: 18 MG/DL (ref 5–25)
CALCIUM SERPL-MCNC: 9 MG/DL (ref 8.3–10.1)
CHLORIDE SERPL-SCNC: 101 MMOL/L (ref 100–108)
CO2 SERPL-SCNC: 28 MMOL/L (ref 21–32)
CREAT SERPL-MCNC: 1.1 MG/DL (ref 0.6–1.3)
EOSINOPHIL # BLD AUTO: 0.35 THOUSAND/ΜL (ref 0–0.61)
EOSINOPHIL # BLD AUTO: 0.43 THOUSAND/ΜL (ref 0–0.61)
EOSINOPHIL NFR BLD AUTO: 4 % (ref 0–6)
EOSINOPHIL NFR BLD AUTO: 5 % (ref 0–6)
ERYTHROCYTE [DISTWIDTH] IN BLOOD BY AUTOMATED COUNT: 13.7 % (ref 11.6–15.1)
ERYTHROCYTE [DISTWIDTH] IN BLOOD BY AUTOMATED COUNT: 14 % (ref 11.6–15.1)
GFR SERPL CREATININE-BSD FRML MDRD: 71 ML/MIN/1.73SQ M
GLUCOSE SERPL-MCNC: 114 MG/DL (ref 65–140)
GLUCOSE SERPL-MCNC: 117 MG/DL (ref 65–140)
GLUCOSE SERPL-MCNC: 122 MG/DL (ref 65–140)
HCT VFR BLD AUTO: 23.4 % (ref 36.5–49.3)
HCT VFR BLD AUTO: 23.7 % (ref 36.5–49.3)
HCT VFR BLD AUTO: 24.8 % (ref 36.5–49.3)
HGB BLD-MCNC: 7.2 G/DL (ref 12–17)
HGB BLD-MCNC: 7.3 G/DL (ref 12–17)
HGB BLD-MCNC: 7.5 G/DL (ref 12–17)
IMM GRANULOCYTES # BLD AUTO: 0.09 THOUSAND/UL (ref 0–0.2)
IMM GRANULOCYTES # BLD AUTO: 0.09 THOUSAND/UL (ref 0–0.2)
IMM GRANULOCYTES NFR BLD AUTO: 1 % (ref 0–2)
IMM GRANULOCYTES NFR BLD AUTO: 1 % (ref 0–2)
INR PPP: 1.28 (ref 0.84–1.19)
LYMPHOCYTES # BLD AUTO: 0.55 THOUSANDS/ΜL (ref 0.6–4.47)
LYMPHOCYTES # BLD AUTO: 0.7 THOUSANDS/ΜL (ref 0.6–4.47)
LYMPHOCYTES NFR BLD AUTO: 6 % (ref 14–44)
LYMPHOCYTES NFR BLD AUTO: 8 % (ref 14–44)
MCH RBC QN AUTO: 26.6 PG (ref 26.8–34.3)
MCH RBC QN AUTO: 27 PG (ref 26.8–34.3)
MCHC RBC AUTO-ENTMCNC: 30.2 G/DL (ref 31.4–37.4)
MCHC RBC AUTO-ENTMCNC: 30.8 G/DL (ref 31.4–37.4)
MCV RBC AUTO: 88 FL (ref 82–98)
MCV RBC AUTO: 88 FL (ref 82–98)
MONOCYTES # BLD AUTO: 0.7 THOUSAND/ΜL (ref 0.17–1.22)
MONOCYTES # BLD AUTO: 0.71 THOUSAND/ΜL (ref 0.17–1.22)
MONOCYTES NFR BLD AUTO: 8 % (ref 4–12)
MONOCYTES NFR BLD AUTO: 8 % (ref 4–12)
NEUTROPHILS # BLD AUTO: 6.45 THOUSANDS/ΜL (ref 1.85–7.62)
NEUTROPHILS # BLD AUTO: 6.89 THOUSANDS/ΜL (ref 1.85–7.62)
NEUTS SEG NFR BLD AUTO: 77 % (ref 43–75)
NEUTS SEG NFR BLD AUTO: 80 % (ref 43–75)
NRBC BLD AUTO-RTO: 0 /100 WBCS
NRBC BLD AUTO-RTO: 0 /100 WBCS
P AXIS: 33 DEGREES
P AXIS: 46 DEGREES
PLATELET # BLD AUTO: 271 THOUSANDS/UL (ref 149–390)
PLATELET # BLD AUTO: 284 THOUSANDS/UL (ref 149–390)
PMV BLD AUTO: 9.1 FL (ref 8.9–12.7)
PMV BLD AUTO: 9.8 FL (ref 8.9–12.7)
POTASSIUM SERPL-SCNC: 4 MMOL/L (ref 3.5–5.3)
PR INTERVAL: 192 MS
PR INTERVAL: 192 MS
PROT SERPL-MCNC: 7.4 G/DL (ref 6.4–8.2)
PROTHROMBIN TIME: 15.4 SECONDS (ref 11.6–14.5)
QRS AXIS: 20 DEGREES
QRS AXIS: 22 DEGREES
QRSD INTERVAL: 96 MS
QRSD INTERVAL: 96 MS
QT INTERVAL: 374 MS
QT INTERVAL: 382 MS
QTC INTERVAL: 447 MS
QTC INTERVAL: 454 MS
RBC # BLD AUTO: 2.67 MILLION/UL (ref 3.88–5.62)
RBC # BLD AUTO: 2.82 MILLION/UL (ref 3.88–5.62)
RH BLD: POSITIVE
SODIUM SERPL-SCNC: 140 MMOL/L (ref 136–145)
SPECIMEN EXPIRATION DATE: NORMAL
T WAVE AXIS: 75 DEGREES
T WAVE AXIS: 84 DEGREES
TROPONIN I SERPL-MCNC: <0.02 NG/ML
VENTRICULAR RATE: 85 BPM
VENTRICULAR RATE: 86 BPM
WBC # BLD AUTO: 8.45 THOUSAND/UL (ref 4.31–10.16)
WBC # BLD AUTO: 8.64 THOUSAND/UL (ref 4.31–10.16)

## 2021-07-19 PROCEDURE — NC001 PR NO CHARGE: Performed by: FAMILY MEDICINE

## 2021-07-19 PROCEDURE — 82948 REAGENT STRIP/BLOOD GLUCOSE: CPT

## 2021-07-19 PROCEDURE — 85018 HEMOGLOBIN: CPT | Performed by: PHYSICIAN ASSISTANT

## 2021-07-19 PROCEDURE — 86850 RBC ANTIBODY SCREEN: CPT | Performed by: PHYSICIAN ASSISTANT

## 2021-07-19 PROCEDURE — 84484 ASSAY OF TROPONIN QUANT: CPT | Performed by: EMERGENCY MEDICINE

## 2021-07-19 PROCEDURE — C9113 INJ PANTOPRAZOLE SODIUM, VIA: HCPCS | Performed by: EMERGENCY MEDICINE

## 2021-07-19 PROCEDURE — 80048 BASIC METABOLIC PNL TOTAL CA: CPT | Performed by: EMERGENCY MEDICINE

## 2021-07-19 PROCEDURE — 96375 TX/PRO/DX INJ NEW DRUG ADDON: CPT

## 2021-07-19 PROCEDURE — 85025 COMPLETE CBC W/AUTO DIFF WBC: CPT | Performed by: EMERGENCY MEDICINE

## 2021-07-19 PROCEDURE — 80076 HEPATIC FUNCTION PANEL: CPT | Performed by: PHYSICIAN ASSISTANT

## 2021-07-19 PROCEDURE — 93010 ELECTROCARDIOGRAM REPORT: CPT | Performed by: INTERNAL MEDICINE

## 2021-07-19 PROCEDURE — 74177 CT ABD & PELVIS W/CONTRAST: CPT

## 2021-07-19 PROCEDURE — 85730 THROMBOPLASTIN TIME PARTIAL: CPT | Performed by: EMERGENCY MEDICINE

## 2021-07-19 PROCEDURE — 36415 COLL VENOUS BLD VENIPUNCTURE: CPT | Performed by: EMERGENCY MEDICINE

## 2021-07-19 PROCEDURE — 86901 BLOOD TYPING SEROLOGIC RH(D): CPT | Performed by: PHYSICIAN ASSISTANT

## 2021-07-19 PROCEDURE — 85610 PROTHROMBIN TIME: CPT | Performed by: EMERGENCY MEDICINE

## 2021-07-19 PROCEDURE — 93005 ELECTROCARDIOGRAM TRACING: CPT

## 2021-07-19 PROCEDURE — 96374 THER/PROPH/DIAG INJ IV PUSH: CPT

## 2021-07-19 PROCEDURE — 85014 HEMATOCRIT: CPT | Performed by: PHYSICIAN ASSISTANT

## 2021-07-19 PROCEDURE — 99204 OFFICE O/P NEW MOD 45 MIN: CPT | Performed by: INTERNAL MEDICINE

## 2021-07-19 PROCEDURE — 86900 BLOOD TYPING SEROLOGIC ABO: CPT | Performed by: PHYSICIAN ASSISTANT

## 2021-07-19 RX ORDER — DOCUSATE SODIUM 100 MG/1
100 CAPSULE, LIQUID FILLED ORAL 2 TIMES DAILY
Qty: 180 EACH | Refills: 0 | Status: SHIPPED | OUTPATIENT
Start: 2021-07-19 | End: 2021-08-16

## 2021-07-19 RX ORDER — POLYETHYLENE GLYCOL 3350 17 G/17G
17 POWDER, FOR SOLUTION ORAL DAILY
Status: DISCONTINUED | OUTPATIENT
Start: 2021-07-19 | End: 2021-07-19 | Stop reason: HOSPADM

## 2021-07-19 RX ORDER — POLYETHYLENE GLYCOL 3350 17 G/17G
17 POWDER, FOR SOLUTION ORAL DAILY
Status: DISCONTINUED | OUTPATIENT
Start: 2021-07-19 | End: 2021-07-19 | Stop reason: SDUPTHER

## 2021-07-19 RX ORDER — LIDOCAINE 50 MG/G
1 PATCH TOPICAL DAILY PRN
Status: DISCONTINUED | OUTPATIENT
Start: 2021-07-19 | End: 2021-07-19 | Stop reason: HOSPADM

## 2021-07-19 RX ORDER — DOCUSATE SODIUM 100 MG/1
100 CAPSULE, LIQUID FILLED ORAL 2 TIMES DAILY
Status: DISCONTINUED | OUTPATIENT
Start: 2021-07-19 | End: 2021-07-19 | Stop reason: HOSPADM

## 2021-07-19 RX ORDER — CYCLOBENZAPRINE HCL 10 MG
5 TABLET ORAL 3 TIMES DAILY PRN
Status: DISCONTINUED | OUTPATIENT
Start: 2021-07-19 | End: 2021-07-19

## 2021-07-19 RX ORDER — PRAVASTATIN SODIUM 80 MG/1
80 TABLET ORAL
Status: DISCONTINUED | OUTPATIENT
Start: 2021-07-19 | End: 2021-07-19 | Stop reason: HOSPADM

## 2021-07-19 RX ORDER — FENTANYL CITRATE 50 UG/ML
25 INJECTION, SOLUTION INTRAMUSCULAR; INTRAVENOUS ONCE AS NEEDED
Status: COMPLETED | OUTPATIENT
Start: 2021-07-19 | End: 2021-07-19

## 2021-07-19 RX ORDER — PANTOPRAZOLE SODIUM 40 MG/1
40 INJECTION, POWDER, FOR SOLUTION INTRAVENOUS EVERY 12 HOURS SCHEDULED
Status: DISCONTINUED | OUTPATIENT
Start: 2021-07-19 | End: 2021-07-19

## 2021-07-19 RX ORDER — PANTOPRAZOLE SODIUM 40 MG/1
40 INJECTION, POWDER, FOR SOLUTION INTRAVENOUS
Status: DISCONTINUED | OUTPATIENT
Start: 2021-07-20 | End: 2021-07-19

## 2021-07-19 RX ORDER — ALLOPURINOL 100 MG/1
200 TABLET ORAL DAILY
Status: DISCONTINUED | OUTPATIENT
Start: 2021-07-19 | End: 2021-07-19 | Stop reason: HOSPADM

## 2021-07-19 RX ORDER — METHOCARBAMOL 500 MG/1
500 TABLET, FILM COATED ORAL ONCE
Status: COMPLETED | OUTPATIENT
Start: 2021-07-19 | End: 2021-07-19

## 2021-07-19 RX ORDER — PANTOPRAZOLE SODIUM 40 MG/1
40 INJECTION, POWDER, FOR SOLUTION INTRAVENOUS ONCE
Status: COMPLETED | OUTPATIENT
Start: 2021-07-19 | End: 2021-07-19

## 2021-07-19 RX ORDER — PANTOPRAZOLE SODIUM 40 MG/1
40 INJECTION, POWDER, FOR SOLUTION INTRAVENOUS
Status: DISCONTINUED | OUTPATIENT
Start: 2021-07-19 | End: 2021-07-19

## 2021-07-19 RX ORDER — CYCLOBENZAPRINE HCL 10 MG
5 TABLET ORAL 3 TIMES DAILY PRN
Status: DISCONTINUED | OUTPATIENT
Start: 2021-07-19 | End: 2021-07-19 | Stop reason: HOSPADM

## 2021-07-19 RX ADMIN — DOCUSATE SODIUM 100 MG: 100 CAPSULE, LIQUID FILLED ORAL at 12:23

## 2021-07-19 RX ADMIN — FENTANYL CITRATE 25 MCG: 50 INJECTION, SOLUTION INTRAMUSCULAR; INTRAVENOUS at 04:05

## 2021-07-19 RX ADMIN — POLYETHYLENE GLYCOL 3350 17 G: 17 POWDER, FOR SOLUTION ORAL at 12:23

## 2021-07-19 RX ADMIN — PANTOPRAZOLE SODIUM 40 MG: 40 INJECTION, POWDER, FOR SOLUTION INTRAVENOUS at 03:08

## 2021-07-19 RX ADMIN — IOHEXOL 100 ML: 350 INJECTION, SOLUTION INTRAVENOUS at 01:34

## 2021-07-19 RX ADMIN — METHOCARBAMOL 500 MG: 500 TABLET ORAL at 03:08

## 2021-07-19 RX ADMIN — ALLOPURINOL 200 MG: 100 TABLET ORAL at 09:19

## 2021-07-19 NOTE — DISCHARGE INSTR - AVS FIRST PAGE
- please check your blood work in couple days and see your PCP  - maintain close follow up with your GI

## 2021-07-19 NOTE — H&P
[facility]  H&P- Reyna Sylvester 1955, 60 y o  male MRN: [id number]  Unit/Bed#: ED 08 Encounter: [id number]  Primary Care Provider: Parish Funez MD   Date and time admitted to hospital: 7/18/2021 11:54 PM    * GI bleed  Assessment & Plan  · CT abdomen pelvis revealing stable retroperitoneal hematoma, no acute changes  · Hemoglobin currently 7 2, was 7 8 on 07/12 in baseline around 10  · Obtain type and screen  · H/H q 6 hours  · If hemoglobin drops below 7 consider blood transfusion  · Will hold VT prophylaxis and home aspirin  · NPO, appreciate GI consult  · IV Protonix 20 mg q d  Abnormal CT of the abdomen  Assessment & Plan  · Revealing increased left pleural effusion mild to moderate  · Patient afebrile, without leukocytosis, no signs of infection so will hold off on antibiotics  · Denying any shortness of breath or increased work of breathing but if this occurs consider pulmonology consult    Mesenteric lymphadenopathy  Assessment & Plan  · Adenopathy appears unchanged on CT abdomen pelvis  · Continue outpatient follow-up with Oncology    Retroperitoneal hematoma  Assessment & Plan  · CT abdomen pelvis revealing large left retroperitoneal hematoma is stable, no extravasation  · Continue follow-up with outpatient provider    Diabetes 1 5, managed as type 2 [facility])  Assessment & Plan  Lab Results   Component Value Date    HGBA1C 6 8 (H) 05/21/2021       No results for input(s): POCGLU in the last 72 hours  Blood Sugar Average: Last 72 hrs:  ·  hold p o  Diabetic med  · Blood glucose checks and sliding scale insulin q 6 hours while NPO  · Hypoglycemia protocol      VTE Pharmacologic Prophylaxis: VTE Score: 4 Moderate Risk (Score 3-4) - Pharmacological DVT Prophylaxis Contraindicated  Sequential Compression Devices Ordered  Code Status: Level 1 - Full Code per patient  Discussion with family: Updated  (wife) at bedside      Anticipated Length of Stay: Patient will be admitted on an observation basis with an anticipated length of stay of less than 2 midnights secondary to See above  Total Time for Visit, including Counseling / Coordination of Care: 60 minutes Greater than 50% of this total time spent on direct patient counseling and coordination of care  Chief Complaint:    Chief Complaint   Patient presents with    Rectal Bleeding     Patient reports blood in stool today, bright red blood, normal colored stool       History of Present Illness:  Jemal Ny is a 60 y o  male with a PMH of diabetes mellitus type 2 mesenteric lymphadenopathy, heart murmur who presents with complaint of 1 episode of bright red blood in his stool tonight when he had a bowel movement  Reports since being discharged from hospital week ago he has been constipated but then noticed when he had his 1st bowel movement this past Wednesday it was almost black in color and had about 2 episodes of that  Denies any shortness of breath, dizziness, abdominal pain, chest pain  Still admits to flank pain status post biopsy of his left retroperitoneal area  Was hospitalize 7/8 to 7/12 for biopsy of lymph nodes  During biopsy retroperitoneal hematoma occurred and patient was anemic with hemoglobin stable at 7 8 on discharge       Review of Systems:  Review of Systems   Constitutional: Negative for activity change  Respiratory: Negative for shortness of breath  Cardiovascular: Negative for chest pain  Gastrointestinal: Positive for blood in stool and constipation  Negative for abdominal pain  Genitourinary: Positive for flank pain  Neurological: Negative for dizziness and headaches         Past Medical and Surgical History:   Past Medical History:   Diagnosis Date    Diabetes mellitus ([facility] [address] )     High cholesterol     Hypertension        Past Surgical History:   Procedure Laterality Date    BUNIONECTOMY Right 11/24/2020    Procedure: RIGHT HAV CORRECTION,;  Surgeon: Nita Erwin DPM; [facility]: [facility];  Service: Podiatry    INCISION AND DRAINAGE OF WOUND Right 10/5/2016    Procedure: INCISION AND DRAINAGE (I&D) EXTREMITY;  Surgeon: Jordi Aldana DPM;  [facility];  Service:     IR BIOPSY LYMPH NODE  7/8/2021    IR EMBOLIZATION (SPECIFY VESSEL OR SITE)  7/8/2021    PILONIDAL CYST EXCISION      OH REPAIR OF HAMMERTOE,ONE Right 2/19/2019    Procedure: THIRD HAMMER TOE CORRECTION;  Surgeon: Jordi Aldana DPM;  [facility];  Service: Podiatry    TOE OSTEOTOMY Right 3/14/2017    Procedure: HAMMERTOE CORRECTION R 2 ;  Surgeon: Jordi Aldana DPM;  [facility];  Service:     TOE OSTEOTOMY Left 11/24/2020    Procedure: LEFT HT CORRECTION TOE;  Surgeon: Jordi Aldana DPM;  [facility];  Service: [address]       Meds/Allergies:  Prior to Admission medications    Medication Sig Start Date End Date Taking?  Authorizing Provider   allopurinol (ZYLOPRIM) 100 mg tablet Take 2 tablets (200 mg total) by mouth daily 7/13/21 8/12/21 Yes Evelio Gannon MD   aspirin 81 mg chewable tablet Chew 81 mg daily   Yes Historical Provider, MD   cyclobenzaprine (FLEXERIL) 5 mg tablet Take 1 tablet (5 mg total) by mouth 3 (three) times a day as needed for muscle spasms for up to 15 days 7/12/21 7/27/21 Yes Evelio Gannon MD   HYDROcodone-acetaminophen (NORCO) 5-325 mg per tablet Take 1 tablet by mouth every 6 (six) hours as needed for pain for up to 5 daysMax Daily Amount: 4 tablets 7/12/21 7/19/21 Yes TANVIR Varela   metFORMIN (GLUCOPHAGE) 500 mg tablet Take 1,000 mg by mouth 2 (two) times a day with meals   Yes Historical Provider, MD   Multiple Vitamin (multivitamin) tablet Take 1 tablet by mouth daily   Yes Historical Provider, MD   pantoprazole (PROTONIX) 40 mg tablet Take 1 tablet (40 mg total) by mouth daily 6/28/21  Yes Yessica Sandoval III, MD   simvastatin (ZOCOR) 40 mg tablet Take 40 mg by mouth daily at bedtime   Yes Historical Provider, MD Have reviewed home medications per review of chart and PDMP  Allergies: Allergies   Allergen Reactions    Lisinopril Cough       Social History:  Marital Status: /Civil Union     Patient Pre-hospital Living Situation: Home  Patient Pre-hospital Level of Mobility: walks  Patient Pre-hospital Diet Restrictions:  Diabetic  Substance Use History:   Social History     Substance and Sexual Activity   Alcohol Use No     Social History     Tobacco Use   Smoking Status Never Smoker   Smokeless Tobacco Never Used   Tobacco Comment    Never smoked but exposed to second hand smoke from birth until 19's     Social History     Substance and Sexual Activity   Drug Use No       Family History:  Family History   Problem Relation Age of Onset    Cancer Father         Leukemia       Physical Exam:     Vitals:   Blood Pressure: 113/61 (07/19/21 0410)  Pulse: 85 (07/19/21 0410)  Temperature: 97 7 °F (36 5 °C) (07/18/21 1923)  Temp Source: Temporal (07/18/21 1923)  Respirations: 18 (07/19/21 0410)  SpO2: 94 % (07/19/21 0410)    Physical Exam  Vitals and nursing note reviewed  Constitutional:       General: He is not in acute distress  Appearance: Normal appearance  He is obese  He is not diaphoretic  HENT:      Head: Normocephalic  Cardiovascular:      Rate and Rhythm: Normal rate and regular rhythm  Pulses: Normal pulses  Heart sounds: Murmur heard  Pulmonary:      Effort: Pulmonary effort is normal  No respiratory distress  Breath sounds: Normal breath sounds  No wheezing or rales  Abdominal:      General: Abdomen is flat  Bowel sounds are normal  There is no distension  Palpations: Abdomen is soft  Tenderness: There is no abdominal tenderness  There is no guarding  Musculoskeletal:         General: No tenderness  Right lower leg: No edema  Left lower leg: No edema  Skin:     General: Skin is warm and dry  Neurological:      General: No focal deficit present  Mental Status: He is alert and oriented to person, place, and time  Psychiatric:         Mood and Affect: Mood normal          Behavior: Behavior normal          Thought Content: Thought content normal          Judgment: Judgment normal          Additional Data:     Lab Results:  Results from last 7 days   Lab Units 07/19/21  0357   WBC Thousand/uL 8 64   HEMOGLOBIN g/dL 7 2*   HEMATOCRIT % 23 4*   PLATELETS Thousands/uL 271   NEUTROS PCT % 80*   LYMPHS PCT % 6*   MONOS PCT % 8   EOS PCT % 4     Results from last 7 days   Lab Units 07/19/21  0357 07/18/21  1938   SODIUM mmol/L 140 139   POTASSIUM mmol/L 4 0 4 3   CHLORIDE mmol/L 101 103   CO2 mmol/L 28 28   BUN mg/dL 18 19   CREATININE mg/dL 1 10 1 10   ANION GAP mmol/L 11 8   CALCIUM mg/dL 9 0 9 4   ALBUMIN g/dL  --  3 3*   TOTAL BILIRUBIN mg/dL  --  1 02*   ALK PHOS U/L  --  85   ALT U/L  --  80*   AST U/L  --  68*   GLUCOSE RANDOM mg/dL 114 111     Results from last 7 days   Lab Units 07/19/21  0107   INR  1 28*                   Imaging: Reviewed radiology reports from this admission including: abdominal/pelvic CT  CT abdomen pelvis with contrast   Final Result by Corby Bowles MD (07/19 1590)   Small to moderate left pleural effusion with left basilar compressive atelectatic changes; superimposed infection must be excluded clinically  Pleural effusion has grown in size  Large left retroperitoneal hematoma appears similar in size to the prior exam  No active extravasation of contrast visualized  Matted central and scattered mesenteric adenopathy unchanged                     Workstation performed: [id number]             EKG and Other Studies Reviewed on Admission:   · EKG: No EKG obtained  ** Please Note: This note has been constructed using a voice recognition system   **

## 2021-07-19 NOTE — ASSESSMENT & PLAN NOTE
· Hb stable  · Seen by GI and cleared for DC  · Likely from hemorrhoids  · Will need to fu with GI as OP  · Check CBC in 2 days and results to PCP

## 2021-07-19 NOTE — CASE MANAGEMENT
CM responded to Code R alert  RECENT ADMISSION: 7/8/2021 - 7/12/2021 (4 days)  [facility]    AGE:65  SEX: Male  DX: retroperitoneal hematoma  OUTCOME: home self care  RESOURCES ON D/C (previous admission):   Patient has a PCP  Patient has insurance  Patient followed post acute by [facility]  CURRENT F/U APPTS:   7/16/2021  [facility] [place]      REASON FOR READMISSION:   LGIB, rectal bleed    INTERVENTIONS: patient discharge   OBV status

## 2021-07-19 NOTE — CASE MANAGEMENT
PT LOS: 13 hours  PATIENT CLASS OBV  PATIENT IS NOT A READMISSION  PATIENT IS NOT A BUNDLE  CM met with patient  And niece at bedside to complete CM open and discuss discharge planning  Patient lives in [address]  Patient lives with  Home set up is   Patient has no DME:  Patient is independent with ADLs and Mobility  Patient states that he has OP wound care for leg ulcer: GIO boot in place  Patient has no BH admissions and no hx  of MH dx:   Patient has no hx of SA  Patients PCP is:Bolivar Roblero MD    and [facility] [address] [address] [facility]  Patient has not used [facility] is past   Patient does not have POA, designated HCR and NOK is wife Jose  Patient's anticipated transportation home is: rox

## 2021-07-19 NOTE — ASSESSMENT & PLAN NOTE
· CT abdomen pelvis revealing stable retroperitoneal hematoma, no acute changes  · Hemoglobin currently 7 2, was 7 8 on 07/12 in baseline around 10  · Obtain type and screen  · H/H q 6 hours  · If hemoglobin drops below 7 consider blood transfusion  · Will hold VT prophylaxis and home aspirin  · NPO, appreciate GI consult  · IV Protonix 20 mg q d

## 2021-07-19 NOTE — CONSULTS
Consultation -  [facility]  Wyvonnia Lesch 60 y o  male MRN: [id number]  Unit/Bed#: ED 08 Encounter: [id number]        Consults    Reason for Consult / Principal Problem: BRBPR    HPI: Megan Cortes is a 59 yo M with a PMH of DM2, HTN, HLD, recently diagnosed follicular lymphoma, who presented to the hospital due to an episode of bright red blood per rectum yesterday when he was having a bowel movement  He was at [facility] about a week and half ago for a biopsy of lymphadenopathy and this was complicated by retroperitoneal hematoma  He was discharged on July 12th with a hemoglobin of 7 8  He reports at that time he had not had a bowel movement for 5 days  He finally had a bowel movement on Wednesday reports that it was very hard and painful  He had bowel movements like this for several days in a row and did not notice any black or bloody stool at that time but his toilet at home is black in color  He noticed the bright red blood per rectum on Sunday when he had a bowel movement  He denies any abdominal pain  He has not had any further bleeding per rectum since then  He is hoping he can go home later today       REVIEW OF SYSTEMS: Negative except for as stated above    Historical Information   Past Medical History:   Diagnosis Date    Diabetes mellitus ([facility] [address] )     High cholesterol     Hypertension      Past Surgical History:   Procedure Laterality Date    BUNIONECTOMY Right 11/24/2020    Procedure: RIGHT HAV CORRECTION,;  Surgeon: Clari Castro DPM;  [facility];  Service: Podiatry    INCISION AND DRAINAGE OF WOUND Right 10/5/2016    Procedure: INCISION AND DRAINAGE (I&D) EXTREMITY;  Surgeon: Clari Castro DPM;  [facility];  Service:     IR BIOPSY LYMPH NODE  7/8/2021    IR EMBOLIZATION (SPECIFY VESSEL OR SITE)  7/8/2021    PILONIDAL CYST EXCISION      CO REPAIR OF HAMMERTOE,ONE Right 2/19/2019    Procedure: THIRD HAMMER TOE CORRECTION;  Surgeon: Clari Castro DPM; [facility];  Service: Podiatry    TOE OSTEOTOMY Right 3/14/2017    Procedure: HAMMERTOE CORRECTION R 2 ;  Surgeon: Senthil Glover DPM;  [facility];  Service:     TOE OSTEOTOMY Left 11/24/2020    Procedure: LEFT HT CORRECTION TOE;  Surgeon: Senthil Glover DPM;  [facility];  Service: [address]     Social History   Social History     Substance and Sexual Activity   Alcohol Use No     Social History     Substance and Sexual Activity   Drug Use No     Social History     Tobacco Use   Smoking Status Never Smoker   Smokeless Tobacco Never Used   Tobacco Comment    Never smoked but exposed to second hand smoke from birth until 19's     Family History   Problem Relation Age of Onset    Cancer Father         Leukemia       Meds/Allergies     (Not in a hospital admission)    Current Facility-Administered Medications   Medication Dose Route Frequency    allopurinol (ZYLOPRIM) tablet 200 mg  200 mg Oral Daily    cyclobenzaprine (FLEXERIL) tablet 5 mg  5 mg Oral TID PRN    docusate sodium (COLACE) capsule 100 mg  100 mg Oral BID    insulin lispro (HumaLOG) 100 units/mL subcutaneous injection 1-5 Units  1-5 Units Subcutaneous Q6H [address]    lidocaine (LIDODERM) 5 % patch 1 patch  1 patch Topical Daily PRN    polyethylene glycol (MIRALAX) packet 17 g  17 g Oral Daily    pravastatin (PRAVACHOL) tablet 80 mg  80 mg Oral Daily With Dinner       Allergies   Allergen Reactions    Lisinopril Cough           Objective     Blood pressure 117/59, pulse 83, temperature 97 7 °F (36 5 °C), temperature source Temporal, resp  rate 15, SpO2 95 %      No intake or output data in the 24 hours ending 07/19/21 1306      PHYSICAL EXAM:      General Appearance:   Alert, cooperative, no distress, appears stated age    HEENT:   Normocephalic, atraumatic, anicteric      Neck:  Supple, symmetrical, trachea midline, no adenopathy;    thyroid: no enlargement/tenderness/nodules; no carotid  bruit or JVD    Lungs:   Clear to auscultation bilaterally; no rales, rhonchi or wheezing; respirations unlabored    Heart[de-identified]   S1 and S2 normal; regular rate and rhythm; no murmur, rub, or gallop  Abdomen:   Soft, non-tender, non-distended; normal bowel sounds; no masses, no organomegaly    Genitalia:   Deferred    Rectal:   Deferred    Extremities:  No cyanosis, clubbing or edema    Pulses:  2+ and symmetric all extremities    Skin:  Skin color, texture, turgor normal, no rashes or lesions    Lymph nodes:  No palpable cervical, axillary or inguinal lymphadenopathy        Lab Results:   Results from last 7 days   Lab Units 07/19/21  0920 07/19/21  0357   WBC Thousand/uL  --  8 64   HEMOGLOBIN g/dL 7 3* 7 2*   HEMATOCRIT % 23 7* 23 4*   PLATELETS Thousands/uL  --  271   NEUTROS PCT %  --  80*   LYMPHS PCT %  --  6*   MONOS PCT %  --  8   EOS PCT %  --  4     Results from last 7 days   Lab Units 07/19/21  0357   POTASSIUM mmol/L 4 0   CHLORIDE mmol/L 101   CO2 mmol/L 28   BUN mg/dL 18   CREATININE mg/dL 1 10   CALCIUM mg/dL 9 0   ALK PHOS U/L 78   ALT U/L 73   AST U/L 54*     Results from last 7 days   Lab Units 07/19/21  0107   INR  1 28*     Results from last 7 days   Lab Units 07/18/21  1938   LIPASE u/L 118       Imaging Studies: I have personally reviewed pertinent imaging studies  CT abdomen pelvis with contrast    Result Date: 7/19/2021  Impression: Small to moderate left pleural effusion with left basilar compressive atelectatic changes; superimposed infection must be excluded clinically  Pleural effusion has grown in size  Large left retroperitoneal hematoma appears similar in size to the prior exam  No active extravasation of contrast visualized  Matted central and scattered mesenteric adenopathy unchanged    Workstation performed: [id number]       ASSESSMENT and PLAN:      BRBPR  Acute Constipation  - He had an episode of BRBPR associated with passing hard stool after he was constipated for 1 week related to opioids administered after a RP lymph node biopsy  - Hb 7 3 today, previously 7 8 on 7/12 when he was discharged from his hospitalization for the RP hematoma  - This is likely an episode of bleeding related to an internal hemorrhoid or fissure in the setting of constipation and he had a EGD/colonoscopy in June this year showing esophagitis, gastritis, and diverticulosis so no indication to repeat his colonoscopy  - Recommend taking colace 100 mg BID, miralax 17 g daily, and using prep H suppositories daily for 1-2 weeks  - Repeat CBC in 1 week, order placed  - Diet as tolerated  - Monitor BMs    Transaminitis  - Mild, noted on admission labs with an AST 68/ALT 80  - Repeat CMP in 1 week; this is likely a drug induced reaction from an anesthetic he recently received so we will hold off on further workup unless this is persistently elevated      The patient will be seen and examined by Dr Mary Duron  The patient is stable for discharge from a GI standpoint

## 2021-07-19 NOTE — ASSESSMENT & PLAN NOTE
· CT abdomen pelvis revealing large left retroperitoneal hematoma is stable, no extravasation  · Continue follow-up with outpatient provider

## 2021-07-19 NOTE — ASSESSMENT & PLAN NOTE
Lab Results   Component Value Date    HGBA1C 6 8 (H) 05/21/2021       No results for input(s): POCGLU in the last 72 hours  Blood Sugar Average: Last 72 hrs:  ·  hold p o   Diabetic med  · Blood glucose checks and sliding scale insulin q 6 hours while NPO  · Hypoglycemia protocol

## 2021-07-19 NOTE — ASSESSMENT & PLAN NOTE
· Revealing increased left pleural effusion mild to moderate  · Patient afebrile, without leukocytosis, no signs of infection so will hold off on antibiotics  · Denying any shortness of breath or increased work of breathing but if this occurs consider pulmonology consult

## 2021-07-19 NOTE — ED PROVIDER NOTES
History  Chief Complaint   Patient presents with    Rectal Bleeding     Patient reports blood in stool today, bright red blood, normal colored stool     60 y o  male presents with one episode of hematochezia that occurred approximately 1 week after being discharged from the hospital following IR embolization of the L2 lumbar artery following retroperitoneal hemorrhage from biopsy  Patient states he had bowel movement Wednesday and Thursday but subsequently did not have another bowel movement until tonight  Tonight, patient states he had a bowel movement 1930 hours "there was a lot of blood "  Patient states "it was a very dry hard movement" and states he noticed hematochezia after standing  Patient denies any melanotic stools  Patient denies hematemesis  Patient states since the procedure he has had pain in his left flank radiating down his left leg since then, though he notes "it is much better "  Patient states the pain feels like "it's like somewhat hit me in the kidney with a baseball bat "  Patient states the pain improves with the muscle relaxers that he was given, cyclobenzaprine  Patient has rarely used the narcotic pain medication that he was prescribed, last 2 days ago  Patient denies any urinary or bowel incontinence  Patient denies any sensory loss  Patient denies any use of iron or bismuth  Patient denies any significant consumption of beets or red food coloring  Patient affirms a history of prior colonoscopy, most recently in June after finding retroperitoneal adenopathy for screening  Patient affirms a history of prior EGD at the same time  Patient denies history of prior PUD  Patient denies any history of hepatic pathology  Patient denies any history of alcoholism  Patient denies any history of coagulopathy  Patient denies use of anticoagulation and affirms any use of anti-platelet medications, aspirin  Patient denies use of NSAIDs    Patient denies use of corticosteroids  Medical Decision Making  Possible gastrointestinal bleeding with a broad differential   Patient with recent embolization from retroperitoneal bleeding which would not match gastrointestinal source though patient had normal colonoscopy in June and no prior similar history  Patient has soft stool on rectal examination that is guaiac positive, states he was having hard stool and no diarrhea but denies any bowel incontinence or bladder incontinence  Will establish large bore IV access and make patient NPO considering possibility of surgical intervention required  Patient placed on continuous cardiopulmonary monitoring  Will administer methocarbamol for symptomatic relief  Will defer fluids at this point in the assessment  Will obtain CBC to evaluate for anemia and platelet count  Will check patient's PT to evaluate for potential coagulopathy  Will obtain type and screen considering patient may need transfusion  Will obtain CMP to evaluate electrolytes, renal and hepatic function  Will obtain urinalysis to evaluate for possible urinary infectious sources and ketones to evaluate potential hydration state  History provided by:  Patient  Black or Bloody Stool  Quality:  Bright red  Amount: Moderate  Timing:  Intermittent  Chronicity:  New  Context: not anal fissures    Relieved by:  Nothing  Worsened by:  Nothing  Associated symptoms: abdominal pain (Flank pain as noted above)    Associated symptoms: no dizziness, no epistaxis, no fever, no hematemesis, no light-headedness, no loss of consciousness, no recent illness and no vomiting        Prior to Admission Medications   Prescriptions Last Dose Informant Patient Reported? Taking?    HYDROcodone-acetaminophen (NORCO) 5-325 mg per tablet   No Yes   Sig: Take 1 tablet by mouth every 6 (six) hours as needed for pain for up to 5 daysMax Daily Amount: 4 tablets   Multiple Vitamin (multivitamin) tablet  Self Yes Yes   Sig: Take 1 tablet by mouth daily   allopurinol (ZYLOPRIM) 100 mg tablet   No Yes   Sig: Take 2 tablets (200 mg total) by mouth daily   aspirin 81 mg chewable tablet  Self Yes Yes   Sig: Chew 81 mg daily   cyclobenzaprine (FLEXERIL) 5 mg tablet   No Yes   Sig: Take 1 tablet (5 mg total) by mouth 3 (three) times a day as needed for muscle spasms for up to 15 days   metFORMIN (GLUCOPHAGE) 500 mg tablet  Self Yes Yes   Sig: Take 1,000 mg by mouth 2 (two) times a day with meals   pantoprazole (PROTONIX) 40 mg tablet   No Yes   Sig: Take 1 tablet (40 mg total) by mouth daily   simvastatin (ZOCOR) 40 mg tablet  Self Yes Yes   Sig: Take 40 mg by mouth daily at bedtime      Facility-Administered Medications: None       Past Medical History:   Diagnosis Date    Diabetes mellitus ([facility] [address] )     High cholesterol     Hypertension        Past Surgical History:   Procedure Laterality Date    BUNIONECTOMY Right 11/24/2020    Procedure: RIGHT HAV CORRECTION,;  Surgeon: Janine Linder DPM;  [facility];  Service: Podiatry    INCISION AND DRAINAGE OF WOUND Right 10/5/2016    Procedure: INCISION AND DRAINAGE (I&D) EXTREMITY;  Surgeon: Janine Linder DPM;  [facility];  Service:     IR BIOPSY LYMPH NODE  7/8/2021    IR EMBOLIZATION (SPECIFY VESSEL OR SITE)  7/8/2021    PILONIDAL CYST EXCISION      ND REPAIR OF HAMMERTOE,ONE Right 2/19/2019    Procedure: THIRD HAMMER TOE CORRECTION;  Surgeon: Janine Linder DPM;  [facility];  Service: Podiatry    TOE OSTEOTOMY Right 3/14/2017    Procedure: HAMMERTOE CORRECTION R 2 ;  Surgeon: Janine Linder DPM;  [facility];  Service:     TOE OSTEOTOMY Left 11/24/2020    Procedure: LEFT HT CORRECTION TOE;  Surgeon: Janine Linder DPM;  [facility];  Service: Podiatry   [address]        Family History   Problem Relation Age of Onset    Cancer Father         Leukemia     I have reviewed and agree with the history as documented      E-Cigarette/Vaping E-Cigarette/Vaping Substances    Nicotine No     THC No     CBD No     Flavoring No     Other No     Unknown No      Social History     Tobacco Use    Smoking status: Never Smoker    Smokeless tobacco: Never Used    Tobacco comment: Never smoked but exposed to second hand smoke from birth until 19's   Vaping Use    Vaping Use: Never assessed   Substance Use Topics    Alcohol use: No    Drug use: No       Review of Systems   Constitutional: Negative for fever  HENT: Negative for nosebleeds  Gastrointestinal: Positive for abdominal pain (Flank pain as noted above) and blood in stool  Negative for hematemesis and vomiting  Neurological: Negative for dizziness, loss of consciousness and light-headedness  All other systems reviewed and are negative  Physical Exam  Physical Exam  Vitals reviewed  Constitutional:       General: He is in acute distress  Appearance: He is not ill-appearing  HENT:      Head: Atraumatic  Mouth/Throat:      Mouth: Mucous membranes are moist    Eyes:      General: No scleral icterus  Cardiovascular:      Rate and Rhythm: Normal rate and regular rhythm  Pulmonary:      Effort: Pulmonary effort is normal    Abdominal:      General: There is no distension  Tenderness: There is no abdominal tenderness  There is no right CVA tenderness, left CVA tenderness, guarding or rebound  Comments: Some tenderness in the left flank though not directly over the CVA  Prior healing surgical incision without erythema or purulent drainage  Musculoskeletal:         General: No deformity  Cervical back: Neck supple  Skin:     General: Skin is warm and dry  Neurological:      General: No focal deficit present  Mental Status: He is alert     Psychiatric:         Mood and Affect: Mood normal          Vital Signs  ED Triage Vitals   Temperature Pulse Respirations Blood Pressure SpO2   07/18/21 1923 07/18/21 1923 07/18/21 1923 07/18/21 1923 07/18/21 1923   97 7 °F (36 5 °C) 93 18 (!) 171/100 97 %      Temp Source Heart Rate Source Patient Position - Orthostatic VS BP Location FiO2 (%)   07/18/21 1923 07/18/21 1923 07/18/21 1923 07/18/21 1923 --   Temporal Monitor Sitting Left arm       Pain Score       07/19/21 0405       5           Vitals:    07/18/21 1923 07/18/21 2359 07/19/21 0410 07/19/21 0930   BP: (!) 171/100 155/76 113/61 117/59   Pulse: 93 87 85 83   Patient Position - Orthostatic VS: Sitting Sitting Sitting Sitting         Visual Acuity      ED Medications  Medications   pantoprazole (PROTONIX) injection 40 mg (40 mg Intravenous Given 7/19/21 0308)   methocarbamol (ROBAXIN) tablet 500 mg (500 mg Oral Given 7/19/21 0308)   iohexol (OMNIPAQUE) 350 MG/ML injection (MULTI-DOSE) 100 mL (100 mL Intravenous Given 7/19/21 0134)   fentanyl citrate (PF) 100 MCG/2ML 25 mcg (25 mcg Intravenous Given 7/19/21 0405)       Diagnostic Studies  Results Reviewed     Procedure Component Value Units Date/Time    Hepatic function panel [[id number]]  (Abnormal) Collected: 07/19/21 0357    Lab Status: Final result Specimen: Blood from Arm, Left Updated: 07/19/21 1248     Total Bilirubin 0 87 mg/dL      Bilirubin, Direct 0 22 mg/dL      Alkaline Phosphatase 78 U/L      AST 54 U/L      ALT 73 U/L      Total Protein 7 4 g/dL      Albumin 3 1 g/dL     Fingerstick Glucose (POCT) [[id number]]  (Normal) Collected: 07/19/21 1225    Lab Status: Final result Updated: 07/19/21 1228     POC Glucose 117 mg/dl     Hemoglobin and hematocrit, blood [[id number]]  (Abnormal) Collected: 07/19/21 0920    Lab Status: Final result Specimen: Blood from Arm, Left Updated: 07/19/21 0938     Hemoglobin 7 3 g/dL      Hematocrit 23 7 %     Fingerstick Glucose (POCT) [[id number]]  (Normal) Collected: 07/19/21 0732    Lab Status: Final result Updated: 07/19/21 0733     POC Glucose 122 mg/dl     Basic metabolic panel [[id number]] Collected: 07/19/21 0357    Lab Status: Final result Specimen: Blood from Arm, Left Updated: 07/19/21 0417     Sodium 140 mmol/L      Potassium 4 0 mmol/L      Chloride 101 mmol/L      CO2 28 mmol/L      ANION GAP 11 mmol/L      BUN 18 mg/dL      Creatinine 1 10 mg/dL      Glucose 114 mg/dL      Calcium 9 0 mg/dL      eGFR 71 ml/min/1 73sq m     Narrative:      [place] guidelines for Chronic Kidney Disease (CKD):     Stage 1 with normal or high GFR (GFR > 90 mL/min/1 73 square meters)    Stage 2 Mild CKD (GFR = 60-89 mL/min/1 73 square meters)    Stage 3A Moderate CKD (GFR = 45-59 mL/min/1 73 square meters)    Stage 3B Moderate CKD (GFR = 30-44 mL/min/1 73 square meters)    Stage 4 Severe CKD (GFR = 15-29 mL/min/1 73 square meters)    Stage 5 End Stage CKD (GFR <15 mL/min/1 73 square meters)  Note: GFR calculation is accurate only with a steady state creatinine    CBC and differential [[id number]]  (Abnormal) Collected: 07/19/21 0357    Lab Status: Final result Specimen: Blood from Arm, Left Updated: 07/19/21 0404     WBC 8 64 Thousand/uL      RBC 2 67 Million/uL      Hemoglobin 7 2 g/dL      Hematocrit 23 4 %      MCV 88 fL      MCH 27 0 pg      MCHC 30 8 g/dL      RDW 14 0 %      MPV 9 1 fL      Platelets 767 Thousands/uL      nRBC 0 /100 WBCs      Neutrophils Relative 80 %      Immat GRANS % 1 %      Lymphocytes Relative 6 %      Monocytes Relative 8 %      Eosinophils Relative 4 %      Basophils Relative 1 %      Neutrophils Absolute 6 89 Thousands/µL      Immature Grans Absolute 0 09 Thousand/uL      Lymphocytes Absolute 0 55 Thousands/µL      Monocytes Absolute 0 71 Thousand/µL      Eosinophils Absolute 0 35 Thousand/µL      Basophils Absolute 0 05 Thousands/µL     Troponin I [[id number]]  (Normal) Collected: 07/19/21 0107    Lab Status: Final result Specimen: Blood from Arm, Right Updated: 07/19/21 0154     Troponin I <0 02 ng/mL     Protime-INR [[id number]]  (Abnormal) Collected: 07/19/21 0107    Lab Status: Final result Specimen: Blood from Arm, Right Updated: 07/19/21 0138     Protime 15 4 seconds      INR 1 28    APTT [[id number]]  (Abnormal) Collected: 07/19/21 0107    Lab Status: Final result Specimen: Blood from Arm, Right Updated: 07/19/21 0138     PTT 41 seconds     CBC and differential [[id number]]  (Abnormal) Collected: 07/19/21 0107    Lab Status: Final result Specimen: Blood from Arm, Right Updated: 07/19/21 0113     WBC 8 45 Thousand/uL      RBC 2 82 Million/uL      Hemoglobin 7 5 g/dL      Hematocrit 24 8 %      MCV 88 fL      MCH 26 6 pg      MCHC 30 2 g/dL      RDW 13 7 %      MPV 9 8 fL      Platelets 663 Thousands/uL      nRBC 0 /100 WBCs      Neutrophils Relative 77 %      Immat GRANS % 1 %      Lymphocytes Relative 8 %      Monocytes Relative 8 %      Eosinophils Relative 5 %      Basophils Relative 1 %      Neutrophils Absolute 6 45 Thousands/µL      Immature Grans Absolute 0 09 Thousand/uL      Lymphocytes Absolute 0 70 Thousands/µL      Monocytes Absolute 0 70 Thousand/µL      Eosinophils Absolute 0 43 Thousand/µL      Basophils Absolute 0 08 Thousands/µL     Wright draw [[id number]] Collected: 07/18/21 1938    Lab Status: Final result Specimen: Blood from Arm, Left Updated: 07/18/21 2102    Narrative: The following orders were created for panel order Wright draw  Procedure                               Abnormality         Status                     ---------                               -----------         ------                     Josephus Primrose Top on LISA[[id number]]                           Final result               Green / Black tube on AVSG[[id number]]                       Final result                 Please view results for these tests on the individual orders      Comprehensive metabolic panel [[id number]]  (Abnormal) Collected: 07/18/21 1938    Lab Status: Final result Specimen: Blood from Arm, Left Updated: 07/18/21 2021     Sodium 139 mmol/L      Potassium 4 3 mmol/L      Chloride 103 mmol/L      CO2 28 mmol/L      ANION GAP 8 mmol/L      BUN 19 mg/dL      Creatinine 1 10 mg/dL      Glucose 111 mg/dL      Calcium 9 4 mg/dL      Corrected Calcium 10 0 mg/dL      AST 68 U/L      ALT 80 U/L      Alkaline Phosphatase 85 U/L      Total Protein 7 9 g/dL      Albumin 3 3 g/dL      Total Bilirubin 1 02 mg/dL      eGFR 71 ml/min/1 73sq m     Narrative:      National Kid[employer] guidelines for Chronic Kidney Disease (CKD):     Stage 1 with normal or high GFR (GFR > 90 mL/min/1 73 square meters)    Stage 2 Mild CKD (GFR = 60-89 mL/min/1 73 square meters)    Stage 3A Moderate CKD (GFR = 45-59 mL/min/1 73 square meters)    Stage 3B Moderate CKD (GFR = 30-44 mL/min/1 73 square meters)    Stage 4 Severe CKD (GFR = 15-29 mL/min/1 73 square meters)    Stage 5 End Stage CKD (GFR <15 mL/min/1 73 square meters)  Note: GFR calculation is accurate only with a steady state creatinine    Lipase [[id number]]  (Normal) Collected: 07/18/21 1938    Lab Status: Final result Specimen: Blood from Arm, Left Updated: 07/18/21 2021     Lipase 118 u/L     CBC and differential [[id number]]  (Abnormal) Collected: 07/18/21 1938    Lab Status: Final result Specimen: Blood from Arm, Left Updated: 07/18/21 2001     WBC 8 64 Thousand/uL      RBC 2 96 Million/uL      Hemoglobin 7 9 g/dL      Hematocrit 25 8 %      MCV 87 fL      MCH 26 7 pg      MCHC 30 6 g/dL      RDW 13 8 %      MPV 9 9 fL      Platelets 059 Thousands/uL      nRBC 0 /100 WBCs      Neutrophils Relative 77 %      Immat GRANS % 1 %      Lymphocytes Relative 7 %      Monocytes Relative 9 %      Eosinophils Relative 5 %      Basophils Relative 1 %      Neutrophils Absolute 6 64 Thousands/µL      Immature Grans Absolute 0 11 Thousand/uL      Lymphocytes Absolute 0 61 Thousands/µL      Monocytes Absolute 0 81 Thousand/µL      Eosinophils Absolute 0 41 Thousand/µL      Basophils Absolute 0 06 Thousands/µL                  CT abdomen pelvis with contrast   Final Result by Mary Jo Lau MD (07/19 5042) Small to moderate left pleural effusion with left basilar compressive atelectatic changes; superimposed infection must be excluded clinically  Pleural effusion has grown in size  Large left retroperitoneal hematoma appears similar in size to the prior exam  No active extravasation of contrast visualized  Matted central and scattered mesenteric adenopathy unchanged                     Workstation performed: [id number]                    Procedures  Procedures         ED Course  ED Course as of Jul 21 0439   Mon Jul 19, 2021   0108 Stable from prior  Hemoglobin(!): 7 9   0108 RDW: 13 8   0109 MCV: 87   0109 Normocytic anemia without RDW increased, concerns for anemia chronic disease from patient's underlying lymphoma  0132 EKG demonstrates normal sinus rhythm with no acute ST segment changes  No prior to compare  0402 Repeat EKG demonstrates normal sinus rhythm with no acute ST segment changes  1388 Patient's hemoglobin has down trended from 7 9-7 5-7 2 during his stay in the emergency room  Patient with normal colonoscopy previously, unlikely to represent hemorrhoid considering this and would be atypical for patient to have GI bleed from his retroperitoneal bleed and there is no signs of active extravasation on CT imaging  Considering this, discussed with patient will place patient in observation for continued monitoring and serial trending of his hemoglobin along with discussion with Gastroenterology regarding whether repeat evaluation would be warranted  SBIRT 20yo+      Most Recent Value   SBIRT (24 yo +)   In order to provide better care to our patients, we are screening all of our patients for alcohol and drug use  Would it be okay to ask you these screening questions? Yes Filed at: 07/19/2021 0006   Initial Alcohol Screen: US AUDIT-C    1  How often do you have a drink containing alcohol?  0 Filed at: 07/19/2021 0412   2   How many drinks containing alcohol do you have on a typical day you are drinking? 0 Filed at: 07/19/2021 0412   3a  Male UNDER 65: How often do you have five or more drinks on one occasion? 0 Filed at: 07/19/2021 0412   Audit-C Score  0 Filed at: 07/19/2021 1603   MARVIN: How many times in the past year have you    Used an illegal drug or used a prescription medication for non-medical reasons? Never Filed at: 07/19/2021 7234                    MDM    Disposition  Final diagnoses:   Lower GI bleed   Anemia     Time reflects when diagnosis was documented in both MDM as applicable and the Disposition within this note     Time User Action Codes Description Comment    7/19/2021  4:26 AM Linard Stai Add [K92 2] Lower GI bleed     7/19/2021  4:26 AM Linard Stai Add [D64 9] Anemia     7/19/2021  1:09 PM Skip Goddard Add [K59 00] Constipation       ED Disposition     ED Disposition Condition Date/Time Comment    Admit Stable Mon Jul 19, 2021  4:26 AM Case was discussed with CHARLES and the patient's admission status was agreed to be Admission Status: observation status to the service of Dr Estefany Beach           Follow-up Information     Follow up With Specialties Details Why Contact Info    Daniela Miller MD Family Medicine Follow up in 1 week(s)  [address] [facility]  [phone]            Discharge Medication List as of 7/19/2021 12:19 PM      CONTINUE these medications which have NOT CHANGED    Details   metFORMIN (GLUCOPHAGE) 500 mg tablet Take 1,000 mg by mouth 2 (two) times a day with meals, Until Discontinued, Historical Med      Multiple Vitamin (multivitamin) tablet Take 1 tablet by mouth daily, Historical Med      pantoprazole (PROTONIX) 40 mg tablet Take 1 tablet (40 mg total) by mouth daily, Starting Mon 6/28/2021, Normal      simvastatin (ZOCOR) 40 mg tablet Take 40 mg by mouth daily at bedtime, Until Discontinued, Historical Med      allopurinol (ZYLOPRIM) 100 mg tablet Take 2 tablets (200 mg total) by mouth daily, Starting Tue 7/13/2021, Until Thu 8/12/2021, Normal      aspirin 81 mg chewable tablet Chew 81 mg daily, Historical Med      cyclobenzaprine (FLEXERIL) 5 mg tablet Take 1 tablet (5 mg total) by mouth 3 (three) times a day as needed for muscle spasms for up to 15 days, Starting Mon 7/12/2021, Until Tue 7/27/2021 at 2359, Normal      HYDROcodone-acetaminophen (NORCO) 5-325 mg per tablet Take 1 tablet by mouth every 6 (six) hours as needed for pain for up to 5 daysMax Daily Amount: 4 tablets, Starting Mon 7/12/2021, Until Mon 7/19/2021 at 2359, Normal           Outpatient Discharge Orders   CBC and Platelet   Standing Status: Future Standing Exp   Date: 07/19/22     Discharge Diet     Activity as tolerated       PDMP Review       Value Time User    PDMP Reviewed  Yes 7/19/2021  5:45 AM Shannan Antoine PA-C          ED Provider  Electronically Signed by           Millicent Mckinley MD  07/21/21 9484

## 2021-07-19 NOTE — DISCHARGE SUMMARY
[facility]  Discharge- Lina Mattson 1955, 60 y o  male MRN: [id number]  Unit/Bed#: ED 08 Encounter: [id number]  Primary Care Provider: Zhao Ng MD   Date and time admitted to hospital: 7/18/2021 11:54 PM    * GI bleed  Assessment & Plan  · Hb stable  · Seen by GI and cleared for DC  · Likely from hemorrhoids  · Will need to fu with GI as OP  · Check CBC in 2 days and results to PCP    Pleural effusion  Assessment & Plan  · Revealing increased left pleural effusion mild to moderate  · Patient afebrile, without leukocytosis, no signs of infection so will hold off on antibiotics  · Denying any shortness of breath or increased work of breathing but if this occurs consider pulm referral as OP  · No hypoxia noted    Mesenteric lymphadenopathy  Assessment & Plan  · Adenopathy appears unchanged on CT abdomen pelvis  · Continue outpatient follow-up with Oncology    Retroperitoneal hematoma  Assessment & Plan  · Stable  · Hb stable  · OP follow up with GI        Medical Problems     Resolved Problems  Date Reviewed: 7/19/2021    None              Discharging Physician / Practitioner: Lars Beaulieu MD  PCP: Zhao Ng MD  Admission Date:   Admission Orders (From admission, onward)     Ordered        07/19/21 0426  Place in Observation  Once                   Discharge Date: 07/19/21    Consultations During Hospital Stay:  · GI    Procedures Performed:   · none    Significant Findings / Test Results:   · anemia    Incidental Findings:   · none     Test Results Pending at Discharge (will require follow up):   · none     Outpatient Tests Requested:  · Cbc in 2 days    Complications:      Reason for Admission: GI bleed    Hospital Course:   Lina Mattson is a 60 y o  male patient who originally presented to the hospital on 7/18/2021 due to GI bleed  H&H was stable  bleeding has stopped  Retroperitoneal hematoma is stable  No abd pain   VS stable  No hypoxia from pleural effusion   GI cleared him for DC with OP followup  Recommended stool softeners at DC  Discussed with the patient and is on board with the Dc planning      Please see above list of diagnoses and related plan for additional information  Condition at Discharge: stable    Discharge Day Visit / Exam:   Subjective:  I am feeling fine and want to go home  Vitals: Blood Pressure: 117/59 (07/19/21 0930)  Pulse: 83 (07/19/21 0930)  Temperature: 97 7 °F (36 5 °C) (07/18/21 1923)  Temp Source: Temporal (07/18/21 1923)  Respirations: 15 (07/19/21 0930)  SpO2: 95 % (07/19/21 0930)  Exam:   Physical Exam General- Awake, alert and oriented x 3, looks comfortable  Morbid obesity  HEENT- Normocephalic, atraumatic, oral mucosa- moist  Neck- Supple, No carotid bruit, no JVD  CVS- Normal S1/ S2, Regular rate and rhythm, No murmur, No edema  Respiratory system- B/L clear breath sounds, no wheezing  Abdomen- Soft, Non distended, no tenderness, Bowel sound- present 4 quads  Genitourinary- No suprapubic tenderness, No CVA tenderness  Skin- No new bruise or rash  Musculoskeletal- No gross deformity  Psych- No acute psychosis  CNS- CN II- XII grossly intact, No acute focal neurologic deficit noted      Discussion with Family: Updated  (niece) at bedside  Discharge instructions/Information to patient and family:   See after visit summary for information provided to patient and family  Provisions for Follow-Up Care:  See after visit summary for information related to follow-up care and any pertinent home health orders  Disposition:   Home    Planned Readmission: no      Discharge Statement:  I spent 45 minutes discharging the patient  This time was spent on the day of discharge  I had direct contact with the patient on the day of discharge   Greater than 50% of the total time was spent examining patient, answering all patient questions, arranging and discussing plan of care with patient as well as directly providing post-discharge instructions  Additional time then spent on discharge activities  Discharge Medications:  See after visit summary for reconciled discharge medications provided to patient and/or family        **Please Note: This note may have been constructed using a voice recognition system**

## 2021-07-19 NOTE — ASSESSMENT & PLAN NOTE
· Revealing increased left pleural effusion mild to moderate  · Patient afebrile, without leukocytosis, no signs of infection so will hold off on antibiotics  · Denying any shortness of breath or increased work of breathing but if this occurs consider pulm referral as OP  · No hypoxia noted

## 2021-07-20 ENCOUNTER — TELEPHONE (OUTPATIENT)
Dept: NEPHROLOGY | Facility: CLINIC | Age: 66
End: 2021-07-20

## 2021-07-20 NOTE — TELEPHONE ENCOUNTER
I called and spoke to the patient about moving his hospital follow up appointment 9/15/2021 with Dr Johanny Coy to 7/21/2021 with Dr Radha Leiva, but the patient stated that he wanted to keep his 9/15/2021 appointment because he was just recently discharged from the hospital on 7/20/2021 and needs to have some more test done  The patient stated that the wanted to just keep his appointment for 9/15/2021  The patient understood and was okay with it   Lexi Minor,

## 2021-08-03 ENCOUNTER — OFFICE VISIT (OUTPATIENT)
Dept: WOUND CARE | Facility: CLINIC | Age: 66
End: 2021-08-03
Payer: COMMERCIAL

## 2021-08-03 VITALS
SYSTOLIC BLOOD PRESSURE: 167 MMHG | RESPIRATION RATE: 20 BRPM | BODY MASS INDEX: 37.14 KG/M2 | WEIGHT: 305 LBS | HEART RATE: 62 BPM | DIASTOLIC BLOOD PRESSURE: 89 MMHG | TEMPERATURE: 97.4 F | HEIGHT: 76 IN

## 2021-08-03 DIAGNOSIS — L97.919 CHRONIC VENOUS HYPERTENSION (IDIOPATHIC) WITH ULCER OF RIGHT LOWER EXTREMITY (HCC): Primary | ICD-10-CM

## 2021-08-03 DIAGNOSIS — I87.311 CHRONIC VENOUS HYPERTENSION (IDIOPATHIC) WITH ULCER OF RIGHT LOWER EXTREMITY (HCC): Primary | ICD-10-CM

## 2021-08-03 PROCEDURE — 99213 OFFICE O/P EST LOW 20 MIN: CPT | Performed by: FAMILY MEDICINE

## 2021-08-03 PROCEDURE — 11042 DBRDMT SUBQ TIS 1ST 20SQCM/<: CPT | Performed by: FAMILY MEDICINE

## 2021-08-03 PROCEDURE — G0463 HOSPITAL OUTPT CLINIC VISIT: HCPCS | Performed by: FAMILY MEDICINE

## 2021-08-03 PROCEDURE — 99203 OFFICE O/P NEW LOW 30 MIN: CPT | Performed by: FAMILY MEDICINE

## 2021-08-03 RX ORDER — LIDOCAINE HYDROCHLORIDE 40 MG/ML
5 SOLUTION TOPICAL ONCE
Status: COMPLETED | OUTPATIENT
Start: 2021-08-03 | End: 2021-08-03

## 2021-08-03 RX ADMIN — LIDOCAINE HYDROCHLORIDE 5 ML: 40 SOLUTION TOPICAL at 08:52

## 2021-08-03 NOTE — PATIENT INSTRUCTIONS
Orders Placed This Encounter   Procedures    Wound cleansing and dressings     Right ankle wound:    Wash your hands with soap and water  Remove old dressing, discard into plastic bag and place in trash  Cleanse the wound with normal saline or mild unscented soap and water prior to applying a clean dressing  Do not use tissue or cotton balls  Do not scrub the wound  Pat dry using gauze  Apply silver alginate to the wound  Cover with optilock and secure with a coflex TLC    Dressing to be changed friday at the wound care center  This was done today at the wound care center  Standing Status:   Future     Standing Expiration Date:   8/3/2022    Wound compression and edema control     Multi-layer compression wrap Instructions    Keep compression wrap/wraps clean and dry  If wraps are too tight and you experience numbness/tingling, call the wound center  If after hours, remove wraps or proceed to nearest E R  and call wound center in AM     Nidhi Rim will be changed Friday 8/6/21    Avoid prolonged standing in one place  Elevate leg(s) above the level of the heart when sitting or as much as possible       Standing Status:   Future     Standing Expiration Date:   8/3/2022

## 2021-08-03 NOTE — PROGRESS NOTES
Patient ID: Stan Victoria is a 60 y o  male Date of Birth 1955     Chief Complaint  Chief Complaint   Patient presents with    New Patient Visit     R ankle       Allergies  Lisinopril    Assessment:    Chronic venous hypertension (idiopathic) with ulcer of right lower extremity ([address] )   Initial evaluation   Debrided as below   Wound management with silver alginate   ABIs obtained today  Compression with coflex   Keep leg elevated as much as possible   Nurse dressing change on Friday  Followup with me in 1 week or call sooner with questions or concerns         Diagnoses and all orders for this visit:    Chronic venous hypertension (idiopathic) with ulcer of right lower extremity (HCC)  -     lidocaine (XYLOCAINE) 4 % topical solution 5 mL  -     Wound cleansing and dressings; Future  -     Wound compression and edema control; Future  -     Debridement              Debridement   Wound 08/03/21 Venous Ulcer Ankle Anterior;Right    Universal Protocol:  Consent: Verbal consent obtained  Consent given by: patient  Time out: Immediately prior to procedure a "time out" was called to verify the correct patient, procedure, equipment, support staff and site/side marked as required    Timeout called at: 8/3/2021 9:00 AM   Patient understanding: patient states understanding of the procedure being performed  Patient identity confirmed: verbally with patient      Performed by: physician  Debridement type: surgical  Level of debridement: subcutaneous tissue  Pain control: lidocaine 4%  Post-debridement measurements  Length (cm): 1  Width (cm): 0 6  Depth (cm): 0 2  Percent debrided: 100%  Surface Area (cm^2): 0 6  Area debrided (cm^2): 0 6  Volume (cm^3): 0 12  Tissue and other material debrided: subcutaneous tissue  Devitalized tissue debrided: exudate and slough  Instrument(s) utilized: curette  Bleeding: small  Hemostasis obtained with: pressure  Procedural pain (0-10): 0  Post-procedural pain: 0   Response to treatment: procedure was tolerated well          Plan:     Wound 08/03/21 Venous Ulcer Ankle Anterior;Right (Active)   Wound Image Images linked 08/03/21 0850   Wound Description Pink;Slough 08/03/21 0850   Delisa-wound Assessment Dry;Hyperpigmented 08/03/21 0850   Wound Length (cm) 1 cm 08/03/21 0850   Wound Width (cm) 0 6 cm 08/03/21 0850   Wound Depth (cm) 0 1 cm 08/03/21 0850   Wound Surface Area (cm^2) 0 6 cm^2 08/03/21 0850   Wound Volume (cm^3) 0 06 cm^3 08/03/21 0850   Calculated Wound Volume (cm^3) 0 06 cm^3 08/03/21 0850   Drainage Amount Moderate 08/03/21 0850   Drainage Description Serosanguineous 08/03/21 0850   Non-staged Wound Description Full thickness 08/03/21 0850   Dressing Status Intact 08/03/21 0850       Wound 08/03/21 Venous Ulcer Ankle Anterior;Right (Active)   Date First Assessed/Time First Assessed: 08/03/21 0850   Pre-Existing Wound: Yes  Primary Wound Type: Venous Ulcer  Location: Ankle  Wound Location Orientation: Anterior;Right       [REMOVED] Wound 10/05/16 Other (Comment) Foot Right abscess lanced in Dr Elizabeth Resides office (Removed)   Resolved Date/Resolved Time: 08/03/21 0854  Date First Assessed/Time First Assessed: 10/05/16 1930   Traumatic Wound Type: (c) Other (Comment)  Location: Foot  Wound Location Orientation: Right  Wound Description (Comments): abscess lanced in Dr Max Paz           [REMOVED] Incision 10/05/16 Foot Right (Removed)   Resolved Date/Resolved Time: 08/03/21 0853  Date First Assessed/Time First Assessed: 10/05/16 2239   Location: Foot  Wound Location Orientation: Right  Wound Description (Comments):  Plain Packing 1 piece       [REMOVED] Incision 03/14/17 Foot Right (Removed)   Resolved Date/Resolved Time: 08/03/21 0853  Date First Assessed/Time First Assessed: 03/14/17 1044   Location: Foot  Wound Location Orientation: Right       [REMOVED] Wound 02/19/19 Foot Proximal (Removed)   Resolved Date/Resolved Time: 08/03/21 0854  Date First Assessed/Time First Assessed: 02/19/19 1137   Location: Foot  Wound Location Orientation: Proximal  Incision's 1st Dressing: DRESSING OIL EMULSION 3 X 8 IN (x1), DRESSING SPONGE 4 x 4 SINGLE PACK     [REMOVED] Wound 11/24/20 Foot Left (Removed)   Resolved Date/Resolved Time: 08/03/21 0854  Date First Assessed/Time First Assessed: 11/24/20 1442   Location: Foot  Wound Location Orientation: Left  Incision's 1st Dressing: DRESSING OIL EMULSION 3 X 3 IN, SPONGE GENERAL 2 X 2 3 PLY NS LF, KERLIX, A  [REMOVED] Wound 11/24/20 Foot Right (Removed)   Resolved Date/Resolved Time: 08/03/21 0853  Date First Assessed/Time First Assessed: 11/24/20 1442   Location: Foot  Wound Location Orientation: Right  Incision's 1st Dressing: DRESSING OIL EMULSION 3 X 3 IN, SPONGE GENERAL 2 X 2 3 PLY NS LF, KERLIX,     [REMOVED] Wound 07/08/21 Surgical Catheter entry/exit site Groin Right (Removed)   Resolved Date/Resolved Time: 08/03/21 0854  Date First Assessed/Time First Assessed: 07/08/21 1758   Primary Wound Type: Surgical  Traumatic Wound Type: Catheter entry/exit site  Location: Groin  Wound Location Orientation: Right       Subjective:        Patient presents for initial evaluation of a right lower extremity ulcer that has been present for approximately 6 weeks  Patient reports that he has had ulcers on this leg in the past   He had venous surgery done in 2-3 years ago and this is his 1st wound since that happened  Patient has been using Santyl on the wound which he states is improving some  Patient typically wears compression stockings but has not been wearing 1 on the right leg because he is unable to get it up over his dressing  Patient is diabetic, blood sugars typically range of the 120s  Last A1c was 6 8  No smoking  The following portions of the patient's history were reviewed and updated as appropriate: He  has a past medical history of Diabetes mellitus ([address] ), High cholesterol, and Hypertension    He   Patient Active Problem List    Diagnosis Date Noted    Chronic venous hypertension (idiopathic) with ulcer of right lower extremity ([facility] ) 08/03/2021    GI bleed 07/19/2021    Pleural effusion 07/19/2021    Heart murmur 07/09/2021    Retroperitoneal hematoma 07/08/2021    Mesenteric lymphadenopathy 07/08/2021    Acute blood loss anemia 07/08/2021    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[place] [id number] ) 06/01/2021    Stasis dermatitis of both legs 04/17/2019    Hammer toe of right foot 02/19/2019    Obesity with body mass index 30 or greater 10/31/2016    Diabetes 1 5, managed as type 2 ([place] [address] ) 10/06/2016    Hypertension 10/06/2016    Hypercholesteremia 10/06/2016    Diabetic peripheral neuropathy ([place] [address] ) 11/10/2014    Gout 12/06/2007    Type 2 diabetes mellitus ([place] [address] ) 12/06/2007     He  has a past surgical history that includes Toe Osteotomy (Right, 3/14/2017); Incision and drainage of wound (Right, 10/5/2016); PILONIDAL CYST EXCISION; pr repair of hammertoe,one (Right, 2/19/2019); Toe Osteotomy (Left, 11/24/2020); Bunionectomy (Right, 11/24/2020); Tonsillectomy (1963); IR biopsy lymph node (7/8/2021); and IR embolization (specify vessel or site) (7/8/2021)  He  reports that he has never smoked  He has never used smokeless tobacco  He reports that he does not drink alcohol and does not use drugs    Current Outpatient Medications   Medication Sig Dispense Refill    allopurinol (ZYLOPRIM) 100 mg tablet Take 2 tablets (200 mg total) by mouth daily 30 tablet 0    aspirin 81 mg chewable tablet Chew 81 mg daily      cyclobenzaprine (FLEXERIL) 5 mg tablet Take 1 tablet (5 mg total) by mouth 3 (three) times a day as needed for muscle spasms for up to 15 days 45 tablet 0    docusate sodium (COLACE) 100 mg capsule Take 1 capsule (100 mg total) by mouth 2 (two) times a day 180 each 0    metFORMIN (GLUCOPHAGE) 500 mg tablet Take 1,000 mg by mouth 2 (two) times a day with meals      Multiple Vitamin (multivitamin) tablet Take 1 tablet by mouth daily  pantoprazole (PROTONIX) 40 mg tablet Take 1 tablet (40 mg total) by mouth daily 30 tablet 2    shark liver oil-cocoa butter (PREPARATION H) 0 25-3-85 5 % suppository Insert 1 suppository into the rectum as needed for hemorrhoids for up to 14 days 12 suppository 0    simvastatin (ZOCOR) 40 mg tablet Take 40 mg by mouth daily at bedtime       No current facility-administered medications for this visit  He is allergic to lisinopril       Review of Systems   Constitutional: Negative for chills and fever  HENT: Negative for congestion and sneezing  Respiratory: Negative for cough  Cardiovascular: Positive for leg swelling  Musculoskeletal: Negative for gait problem  Skin: Positive for wound  Psychiatric/Behavioral: Negative for agitation  Objective:       Wound 08/03/21 Venous Ulcer Ankle Anterior;Right (Active)   Wound Image Images linked 08/03/21 0850   Wound Description Pink;Slough 08/03/21 0850   Delisa-wound Assessment Dry;Hyperpigmented 08/03/21 0850   Wound Length (cm) 1 cm 08/03/21 0850   Wound Width (cm) 0 6 cm 08/03/21 0850   Wound Depth (cm) 0 1 cm 08/03/21 0850   Wound Surface Area (cm^2) 0 6 cm^2 08/03/21 0850   Wound Volume (cm^3) 0 06 cm^3 08/03/21 0850   Calculated Wound Volume (cm^3) 0 06 cm^3 08/03/21 0850   Drainage Amount Moderate 08/03/21 0850   Drainage Description Serosanguineous 08/03/21 0850   Non-staged Wound Description Full thickness 08/03/21 0850   Dressing Status Intact 08/03/21 0850       /89   Pulse 62   Temp (!) 97 4 °F (36 3 °C)   Resp 20   Ht 6' 4" (1 93 m)   Wt (!) 138 kg (305 lb)   BMI 37 13 kg/m²     Physical Exam  Constitutional:       General: He is not in acute distress  Appearance: Normal appearance  He is obese  He is not ill-appearing, toxic-appearing or diaphoretic  HENT:      Head: Normocephalic and atraumatic        Right Ear: External ear normal       Left Ear: External ear normal    Eyes:      Conjunctiva/sclera: Conjunctivae normal    Cardiovascular:      Pulses:           Dorsalis pedis pulses are detected w/ Doppler on the right side  Posterior tibial pulses are detected w/ Doppler on the right side  Pulmonary:      Effort: Pulmonary effort is normal  No respiratory distress  Musculoskeletal:      Cervical back: Neck supple  Right lower leg: Edema present  Skin:     Comments: See wound assessment   Neurological:      Mental Status: He is alert  Gait: Gait normal    Psychiatric:         Mood and Affect: Mood normal          Behavior: Behavior normal            Wound 08/03/21 Venous Ulcer Ankle Anterior;Right (Active)   Wound Image   08/03/21 0850   Wound Description Pink;Slough 08/03/21 0850   Delisa-wound Assessment Dry;Hyperpigmented 08/03/21 0850   Wound Length (cm) 1 cm 08/03/21 0850   Wound Width (cm) 0 6 cm 08/03/21 0850   Wound Depth (cm) 0 1 cm 08/03/21 0850   Wound Surface Area (cm^2) 0 6 cm^2 08/03/21 0850   Wound Volume (cm^3) 0 06 cm^3 08/03/21 0850   Calculated Wound Volume (cm^3) 0 06 cm^3 08/03/21 0850   Drainage Amount Moderate 08/03/21 0850   Drainage Description Serosanguineous 08/03/21 0850   Non-staged Wound Description Full thickness 08/03/21 0850   Dressing Status Intact 08/03/21 0850                         Wound Instructions:  Orders Placed This Encounter   Procedures    Wound cleansing and dressings     Right ankle wound:    Wash your hands with soap and water  Remove old dressing, discard into plastic bag and place in trash  Cleanse the wound with normal saline or mild unscented soap and water prior to applying a clean dressing  Do not use tissue or cotton balls  Do not scrub the wound  Pat dry using gauze  Apply silver alginate to the wound  Cover with optilock and secure with a coflex TLC    Dressing to be changed friday at the wound care center  This was done today at the wound care center       Standing Status:   Future     Standing Expiration Date:   8/3/2022    Wound compression and edema control     Multi-layer compression wrap Instructions    Keep compression wrap/wraps clean and dry  If wraps are too tight and you experience numbness/tingling, call the wound center  If after hours, remove wraps or proceed to nearest E R  and call wound center in AM     Dorna Winner will be changed Friday 8/6/21    Avoid prolonged standing in one place  Elevate leg(s) above the level of the heart when sitting or as much as possible  Standing Status:   Future     Standing Expiration Date:   8/3/2022    Debridement     This order was created via procedure documentation        Diagnosis ICD-10-CM Associated Orders   1   Chronic venous hypertension (idiopathic) with ulcer of right lower extremity (HCC)  I87 [id number] lidocaine (XYLOCAINE) 4 % topical solution 5 mL    L97 [id number] Wound cleansing and dressings     Wound compression and edema control     Debridement

## 2021-08-06 ENCOUNTER — CLINICAL SUPPORT (OUTPATIENT)
Dept: WOUND CARE | Facility: CLINIC | Age: 66
End: 2021-08-06
Payer: COMMERCIAL

## 2021-08-06 VITALS
RESPIRATION RATE: 18 BRPM | SYSTOLIC BLOOD PRESSURE: 191 MMHG | TEMPERATURE: 97.2 F | HEART RATE: 72 BPM | DIASTOLIC BLOOD PRESSURE: 92 MMHG

## 2021-08-06 DIAGNOSIS — I87.311 CHRONIC VENOUS HYPERTENSION (IDIOPATHIC) WITH ULCER OF RIGHT LOWER EXTREMITY (HCC): Primary | ICD-10-CM

## 2021-08-06 DIAGNOSIS — L97.919 CHRONIC VENOUS HYPERTENSION (IDIOPATHIC) WITH ULCER OF RIGHT LOWER EXTREMITY (HCC): Primary | ICD-10-CM

## 2021-08-06 PROCEDURE — 29581 APPL MULTLAYER CMPRN SYS LEG: CPT

## 2021-08-06 PROCEDURE — NC001 PR NO CHARGE

## 2021-08-06 NOTE — PATIENT INSTRUCTIONS
Orders Placed This Encounter   Procedures    Cast Application     This order was created via procedure documentation    Wound cleansing and dressings     Right ankle wound:     Wash your hands with soap and water  Remove old dressing, discard into plastic bag and place in trash  Cleanse the wound with normal saline or mild unscented soap and water prior to applying a clean dressing  Do not use tissue or cotton balls  Do not scrub the wound  Pat dry using gauze      Apply silver alginate to the wound  Cover with optilock and secure with a coflex TLC     Dressing to be changed friday at the wound care center         This was done today at the wound care center             Standing Status:   Future     Standing Expiration Date:   8/6/2022    Wound compression and edema control     Multi-layer compression wrap Instructions     Keep compression wrap/wraps clean and dry  If wraps are too tight and you experience numbness/tingling, call the wound center  If after hours, remove wraps or proceed to nearest E R  and call wound center in AM      Wrap will be changed Friday 8/6/21     Avoid prolonged standing in one place      Elevate leg(s) above the level of the heart when sitting or as much as possible       Standing Status:   Future     Standing Expiration Date:   8/6/2022

## 2021-08-06 NOTE — PROGRESS NOTES
Cast Application    Date/Time: 8/6/2021 12:42 PM  Performed by: Vero Eller  Authorized by: Genia Parker DO   Universal Protocol:  Consent: Verbal consent obtained  Consent given by: patient  Patient understanding: patient states understanding of the procedure being performed  Patient consent: the patient's understanding of the procedure matches consent given  Procedure consent: procedure consent matches procedure scheduled  Relevant documents: relevant documents present and verified  Patient identity confirmed: verbally with patient      Pre-procedure details:     Sensation:  Normal  Procedure details:     Laterality:  Right    Location:  Leg    Leg:  R lower leg    Strapping: no  Cast type:  Multi-layer compression short leg      Supplies:  2 layer wrap  Post-procedure details:     Pain:  Improved    Sensation:  Normal    Patient tolerance of procedure: Tolerated well, no immediate complications  Comments:        Multiplayer wrap procedure     Before application, OBIE and/or TBI determined to be adequate for healing and application of compression  Lower extremity washed prior to application of compression wrap  With the foot in dorsiflexed position, coflex was applied as per physician orders without complications or complaint of pain  The procedure was tolerated well  Toes warm & pink post application    Patient provided education & reinforced to observe toes for any discoloration, swelling or tingling and instructed when to report to the [address] [facility],[address] or to remove compression

## 2021-08-10 ENCOUNTER — OFFICE VISIT (OUTPATIENT)
Dept: WOUND CARE | Facility: CLINIC | Age: 66
End: 2021-08-10
Payer: COMMERCIAL

## 2021-08-10 ENCOUNTER — APPOINTMENT (OUTPATIENT)
Dept: LAB | Age: 66
End: 2021-08-10
Payer: COMMERCIAL

## 2021-08-10 ENCOUNTER — HOSPITAL ENCOUNTER (OUTPATIENT)
Dept: RADIOLOGY | Age: 66
Discharge: HOME | End: 2021-08-10
Payer: COMMERCIAL

## 2021-08-10 VITALS
TEMPERATURE: 97.2 F | DIASTOLIC BLOOD PRESSURE: 83 MMHG | SYSTOLIC BLOOD PRESSURE: 161 MMHG | RESPIRATION RATE: 18 BRPM | HEART RATE: 72 BPM

## 2021-08-10 DIAGNOSIS — D64.9 ANEMIA, UNSPECIFIED TYPE: ICD-10-CM

## 2021-08-10 DIAGNOSIS — K64.8 INTERNAL HEMORRHOID, BLEEDING: ICD-10-CM

## 2021-08-10 DIAGNOSIS — E78.5 HYPERLIPIDEMIA, UNSPECIFIED HYPERLIPIDEMIA TYPE: ICD-10-CM

## 2021-08-10 DIAGNOSIS — L97.919 CHRONIC VENOUS HYPERTENSION (IDIOPATHIC) WITH ULCER OF RIGHT LOWER EXTREMITY (HCC): Primary | ICD-10-CM

## 2021-08-10 DIAGNOSIS — K92.2 LOWER GI BLEED: ICD-10-CM

## 2021-08-10 DIAGNOSIS — R74.01 TRANSAMINITIS: ICD-10-CM

## 2021-08-10 DIAGNOSIS — IMO0002: ICD-10-CM

## 2021-08-10 DIAGNOSIS — C82.93 FOLLICULAR LYMPHOMA OF INTRA-ABDOMINAL LYMPH NODES, UNSPECIFIED FOLLICULAR LYMPHOMA TYPE (HCC): ICD-10-CM

## 2021-08-10 DIAGNOSIS — I87.311 CHRONIC VENOUS HYPERTENSION (IDIOPATHIC) WITH ULCER OF RIGHT LOWER EXTREMITY (HCC): Primary | ICD-10-CM

## 2021-08-10 DIAGNOSIS — K92.2 ACUTE GASTROINTESTINAL HEMORRHAGE: ICD-10-CM

## 2021-08-10 LAB
ALBUMIN SERPL BCP-MCNC: 3.7 G/DL (ref 3.5–5)
ALP SERPL-CCNC: 75 U/L (ref 46–116)
ALT SERPL W P-5'-P-CCNC: 36 U/L (ref 12–78)
ANION GAP SERPL CALCULATED.3IONS-SCNC: 2 MMOL/L (ref 4–13)
AST SERPL W P-5'-P-CCNC: 24 U/L (ref 5–45)
BASOPHILS # BLD AUTO: 0.07 THOUSANDS/ΜL (ref 0–0.1)
BASOPHILS NFR BLD AUTO: 1 % (ref 0–1)
BILIRUB SERPL-MCNC: 0.36 MG/DL (ref 0.2–1)
BUN SERPL-MCNC: 20 MG/DL (ref 5–25)
CALCIUM SERPL-MCNC: 9.7 MG/DL (ref 8.3–10.1)
CHLORIDE SERPL-SCNC: 106 MMOL/L (ref 100–108)
CHOLEST SERPL-MCNC: 152 MG/DL (ref 50–200)
CO2 SERPL-SCNC: 29 MMOL/L (ref 21–32)
CREAT SERPL-MCNC: 0.86 MG/DL (ref 0.6–1.3)
EOSINOPHIL # BLD AUTO: 0.29 THOUSAND/ΜL (ref 0–0.61)
EOSINOPHIL NFR BLD AUTO: 5 % (ref 0–6)
ERYTHROCYTE [DISTWIDTH] IN BLOOD BY AUTOMATED COUNT: 15.7 % (ref 11.6–15.1)
EST. AVERAGE GLUCOSE BLD GHB EST-MCNC: 108 MG/DL
FERRITIN SERPL-MCNC: 208 NG/ML (ref 8–388)
GFR SERPL CREATININE-BSD FRML MDRD: 92 ML/MIN/1.73SQ M
GLUCOSE P FAST SERPL-MCNC: 93 MG/DL (ref 65–99)
GLUCOSE SERPL-MCNC: 108 MG/DL (ref 65–140)
HBA1C MFR BLD: 5.4 %
HCT VFR BLD AUTO: 37.4 % (ref 36.5–49.3)
HDLC SERPL-MCNC: 34 MG/DL
HGB BLD-MCNC: 11.2 G/DL (ref 12–17)
IMM GRANULOCYTES # BLD AUTO: 0.03 THOUSAND/UL (ref 0–0.2)
IMM GRANULOCYTES NFR BLD AUTO: 1 % (ref 0–2)
IRON SATN MFR SERPL: 15 %
IRON SERPL-MCNC: 52 UG/DL (ref 65–175)
LDLC SERPL CALC-MCNC: 78 MG/DL (ref 0–100)
LYMPHOCYTES # BLD AUTO: 0.82 THOUSANDS/ΜL (ref 0.6–4.47)
LYMPHOCYTES NFR BLD AUTO: 14 % (ref 14–44)
MCH RBC QN AUTO: 26.7 PG (ref 26.8–34.3)
MCHC RBC AUTO-ENTMCNC: 29.9 G/DL (ref 31.4–37.4)
MCV RBC AUTO: 89 FL (ref 82–98)
MONOCYTES # BLD AUTO: 0.48 THOUSAND/ΜL (ref 0.17–1.22)
MONOCYTES NFR BLD AUTO: 8 % (ref 4–12)
NEUTROPHILS # BLD AUTO: 4.37 THOUSANDS/ΜL (ref 1.85–7.62)
NEUTS SEG NFR BLD AUTO: 71 % (ref 43–75)
NONHDLC SERPL-MCNC: 118 MG/DL
NRBC BLD AUTO-RTO: 0 /100 WBCS
PLATELET # BLD AUTO: 199 THOUSANDS/UL (ref 149–390)
PMV BLD AUTO: 11 FL (ref 8.9–12.7)
POTASSIUM SERPL-SCNC: 4.2 MMOL/L (ref 3.5–5.3)
PROT SERPL-MCNC: 7.7 G/DL (ref 6.4–8.2)
RBC # BLD AUTO: 4.19 MILLION/UL (ref 3.88–5.62)
SODIUM SERPL-SCNC: 137 MMOL/L (ref 136–145)
TIBC SERPL-MCNC: 355 UG/DL (ref 250–450)
TRIGL SERPL-MCNC: 200 MG/DL
WBC # BLD AUTO: 6.06 THOUSAND/UL (ref 4.31–10.16)

## 2021-08-10 PROCEDURE — 29581 APPL MULTLAYER CMPRN SYS LEG: CPT

## 2021-08-10 PROCEDURE — 82728 ASSAY OF FERRITIN: CPT

## 2021-08-10 PROCEDURE — 99213 OFFICE O/P EST LOW 20 MIN: CPT | Performed by: FAMILY MEDICINE

## 2021-08-10 PROCEDURE — 80053 COMPREHEN METABOLIC PANEL: CPT

## 2021-08-10 PROCEDURE — A9552 F18 FDG: HCPCS

## 2021-08-10 PROCEDURE — 36415 COLL VENOUS BLD VENIPUNCTURE: CPT

## 2021-08-10 PROCEDURE — 83036 HEMOGLOBIN GLYCOSYLATED A1C: CPT

## 2021-08-10 PROCEDURE — 80061 LIPID PANEL: CPT

## 2021-08-10 PROCEDURE — 83550 IRON BINDING TEST: CPT

## 2021-08-10 PROCEDURE — 82948 REAGENT STRIP/BLOOD GLUCOSE: CPT

## 2021-08-10 PROCEDURE — G1004 CDSM NDSC: HCPCS

## 2021-08-10 PROCEDURE — 83540 ASSAY OF IRON: CPT

## 2021-08-10 PROCEDURE — 78815 PET IMAGE W/CT SKULL-THIGH: CPT

## 2021-08-10 PROCEDURE — 29581 APPL MULTLAYER CMPRN SYS LEG: CPT | Performed by: FAMILY MEDICINE

## 2021-08-10 PROCEDURE — 85025 COMPLETE CBC W/AUTO DIFF WBC: CPT

## 2021-08-10 RX ORDER — LIDOCAINE HYDROCHLORIDE 40 MG/ML
5 SOLUTION TOPICAL ONCE
Status: COMPLETED | OUTPATIENT
Start: 2021-08-10 | End: 2021-08-10

## 2021-08-10 RX ADMIN — LIDOCAINE HYDROCHLORIDE 5 ML: 40 SOLUTION TOPICAL at 08:41

## 2021-08-10 NOTE — PROGRESS NOTES
Cast Application    Date/Time: 8/10/2021 8:54 AM  Performed by: Colton Patel RN  Authorized by: Javi Min DO   Universal Protocol:  Consent: Verbal consent obtained  Consent given by: patient  Timeout called at: 8/10/2021 8:54 AM   Patient understanding: patient states understanding of the procedure being performed  Patient consent: the patient's understanding of the procedure matches consent given  Procedure consent: procedure consent matches procedure scheduled  Relevant documents: relevant documents present and verified  Patient identity confirmed: verbally with patient      Pre-procedure details:     Sensation:  Normal  Procedure details:     Laterality:  Right    Location:  Leg    Leg:  R lower leg    Strapping: no  Cast type:  Multi-layer compression short leg      Supplies:  2 layer wrap  Post-procedure details:     Pain:  Improved    Sensation:  Normal    Patient tolerance of procedure: Tolerated well, no immediate complications  Comments:        Multiplayer wrap procedure     Before application, OBIE and/or TBI determined to be adequate for healing and application of compression  Lower extremity washed prior to application of compression wrap  With the foot in dorsiflexed position, coflex was applied as per physician orders without complications or complaint of pain  The procedure was tolerated well  Toes warm & pink post application    Patient provided education & reinforced to observe toes for any discoloration, swelling or tingling and instructed when to report to the [address] [facility],[address] or to remove compression

## 2021-08-10 NOTE — PATIENT INSTRUCTIONS
Orders Placed This Encounter   Procedures    Wound cleansing and dressings     Right ankle wound:     Wash your hands with soap and water  Remove old dressing, discard into plastic bag and place in trash  Cleanse the wound with normal saline or mild unscented soap and water prior to applying a clean dressing  Do not use tissue or cotton balls  Do not scrub the wound  Pat dry using gauze      Mupirocin to the open pustules on the RLE, cover open areas on ankle with ABD and wrap with coflex TLC       This was done today at the wound care center  See back in 1 week     Standing Status:   Future     Standing Expiration Date:   8/10/2022    Wound compression and edema control     Multi-layer compression wrap Instructions     Keep compression wrap/wraps clean and dry  If wraps are too tight and you experience numbness/tingling, call the wound center  If after hours, remove wraps or proceed to nearest E R  and call wound center in AM      Avoid prolonged standing in one place      Elevate leg(s) above the level of the heart when sitting or as much as possible       Standing Status:   Future     Standing Expiration Date:   8/10/2022

## 2021-08-10 NOTE — PROGRESS NOTES
Patient ID: Kika Bynum is a 60 y o  male Date of Birth 1955     Chief Complaint  Chief Complaint   Patient presents with    Follow Up Wound Care Visit     RLE wound       Allergies  Lisinopril    Assessment:    Chronic venous hypertension (idiopathic) with ulcer of right lower extremity ([address] )   Original wound is much improved, almost closed but there to new areas that are open at the border of where the optilock was   there also scattered pustules noted on the right lower extremity below the knee  Wound management as below with mupirocin on the leg   continue compression with coflex   keep leg elevated as much as possible   Followup in 1 week or call sooner with questions or concerns       Diagnoses and all orders for this visit:    Chronic venous hypertension (idiopathic) with ulcer of right lower extremity (HCC)  -     lidocaine (XYLOCAINE) 4 % topical solution 5 mL  -     Wound cleansing and dressings; Future  -     Wound compression and edema control;  Future  -     Cast Application              Procedures    Plan:     Wound 08/03/21 Venous Ulcer Ankle Anterior;Right (Active)   Wound Image Images linked 08/10/21 0837   Wound Description Pink 08/10/21 0800   Delisa-wound Assessment Dry;Hyperpigmented 08/10/21 0800   Wound Length (cm) 10 5 cm 08/10/21 0800   Wound Width (cm) 4 cm 08/10/21 0800   Wound Depth (cm) 0 1 cm 08/10/21 0800   Wound Surface Area (cm^2) 42 cm^2 08/10/21 0800   Wound Volume (cm^3) 4 2 cm^3 08/10/21 0800   Calculated Wound Volume (cm^3) 4 2 cm^3 08/10/21 0800   Change in Wound Size % -6900 08/10/21 0800   Drainage Amount Small 08/10/21 0800   Drainage Description Serosanguineous 08/10/21 0800   Non-staged Wound Description Full thickness 08/10/21 0800   Dressing Status Intact 08/10/21 0800       Wound 08/03/21 Venous Ulcer Ankle Anterior;Right (Active)   Date First Assessed/Time First Assessed: 08/03/21 0850   Pre-Existing Wound: Yes  Primary Wound Type: Venous Ulcer  Location: Ankle  Wound Location Orientation: Anterior;Right       [REMOVED] Wound 10/05/16 Other (Comment) Foot Right abscess lanced in Dr Sonu Shaw office (Removed)   Resolved Date/Resolved Time: 08/03/21 0854  Date First Assessed/Time First Assessed: 10/05/16 1930   Traumatic Wound Type: (c) Other (Comment)  Location: Foot  Wound Location Orientation: Right  Wound Description (Comments): abscess lanced in Dr Kathryn Bell    [REMOVED] Incision 10/05/16 Foot Right (Removed)   Resolved Date/Resolved Time: 08/03/21 0853  Date First Assessed/Time First Assessed: 10/05/16 2239   Location: Foot  Wound Location Orientation: Right  Wound Description (Comments):  Plain Packing 1 piece       [REMOVED] Incision 03/14/17 Foot Right (Removed)   Resolved Date/Resolved Time: 08/03/21 0853  Date First Assessed/Time First Assessed: 03/14/17 1044   Location: Foot  Wound Location Orientation: Right       [REMOVED] Wound 02/19/19 Foot Proximal (Removed)   Resolved Date/Resolved Time: 08/03/21 0854  Date First Assessed/Time First Assessed: 02/19/19 1137   Location: Foot  Wound Location Orientation: Proximal  Incision's 1st Dressing: DRESSING OIL EMULSION 3 X 8 IN (x1), DRESSING SPONGE 4 x 4 SINGLE PACK     [REMOVED] Wound 11/24/20 Foot Left (Removed)   Resolved Date/Resolved Time: 08/03/21 0854  Date First Assessed/Time First Assessed: 11/24/20 1442   Location: Foot  Wound Location Orientation: Left  Incision's 1st Dressing: DRESSING OIL EMULSION 3 X 3 IN, SPONGE GENERAL 2 X 2 3 PLY NS LF, KERLIX, A  [REMOVED] Wound 11/24/20 Foot Right (Removed)   Resolved Date/Resolved Time: 08/03/21 0853  Date First Assessed/Time First Assessed: 11/24/20 1442   Location: Foot  Wound Location Orientation: Right  Incision's 1st Dressing: DRESSING OIL EMULSION 3 X 3 IN, SPONGE GENERAL 2 X 2 3 PLY NS LF, KERLIX,            [REMOVED] Wound 07/08/21 Surgical Catheter entry/exit site Groin Right (Removed)   Resolved Date/Resolved Time: 08/03/21 0854  Date First Assessed/Time First Assessed: 07/08/21 9518   Primary Wound Type: Surgical  Traumatic Wound Type: Catheter entry/exit site  Location: Groin  Wound Location Orientation: Right       Subjective:        Patient presents for followup right lower extremity venous ulcers  No significant pain or drainage  Has been using Silver alginate on the wound and coflex for compression  Had a nurse visit on Friday      The following portions of the patient's history were reviewed and updated as appropriate: He  has a past medical history of Diabetes mellitus ([address] ), High cholesterol, and Hypertension  He  reports that he has never smoked  He has never used smokeless tobacco  He reports that he does not drink alcohol and does not use drugs  He is allergic to lisinopril       Review of Systems   Constitutional: Negative for chills and fever  HENT: Negative for congestion and sneezing  Respiratory: Negative for cough  Cardiovascular: Positive for leg swelling  Skin: Positive for wound  Psychiatric/Behavioral: Negative for agitation  Objective:       Wound 08/03/21 Venous Ulcer Ankle Anterior;Right (Active)   Wound Image Images linked 08/10/21 0837   Wound Description Pink 08/10/21 0800   Delisa-wound Assessment Dry;Hyperpigmented 08/10/21 0800   Wound Length (cm) 10 5 cm 08/10/21 0800   Wound Width (cm) 4 cm 08/10/21 0800   Wound Depth (cm) 0 1 cm 08/10/21 0800   Wound Surface Area (cm^2) 42 cm^2 08/10/21 0800   Wound Volume (cm^3) 4 2 cm^3 08/10/21 0800   Calculated Wound Volume (cm^3) 4 2 cm^3 08/10/21 0800   Change in Wound Size % -6900 08/10/21 0800   Drainage Amount Small 08/10/21 0800   Drainage Description Serosanguineous 08/10/21 0800   Non-staged Wound Description Full thickness 08/10/21 0800   Dressing Status Intact 08/10/21 0800       /83   Pulse 72   Temp (!) 97 2 °F (36 2 °C)   Resp 18     Physical Exam  Constitutional:       General: He is not in acute distress       Appearance: Normal appearance  He is obese  He is not ill-appearing, toxic-appearing or diaphoretic  HENT:      Head: Normocephalic and atraumatic  Right Ear: External ear normal       Left Ear: External ear normal    Eyes:      Conjunctiva/sclera: Conjunctivae normal    Pulmonary:      Effort: Pulmonary effort is normal  No respiratory distress  Musculoskeletal:      Cervical back: Neck supple  Right lower leg: Edema present  Left lower leg: Edema present  Skin:     Comments: See wound assessment   Neurological:      Mental Status: He is alert  Gait: Gait normal    Psychiatric:         Mood and Affect: Mood normal          Behavior: Behavior normal            Wound 08/03/21 Venous Ulcer Ankle Anterior;Right (Active)   Wound Image   08/10/21 0837   Wound Description Pink 08/10/21 0800   Delisa-wound Assessment Dry;Hyperpigmented 08/10/21 0800   Wound Length (cm) 10 5 cm 08/10/21 0800   Wound Width (cm) 4 cm 08/10/21 0800   Wound Depth (cm) 0 1 cm 08/10/21 0800   Wound Surface Area (cm^2) 42 cm^2 08/10/21 0800   Wound Volume (cm^3) 4 2 cm^3 08/10/21 0800   Calculated Wound Volume (cm^3) 4 2 cm^3 08/10/21 0800   Change in Wound Size % -6900 08/10/21 0800   Drainage Amount Small 08/10/21 0800   Drainage Description Serosanguineous 08/10/21 0800   Non-staged Wound Description Full thickness 08/10/21 0800   Dressing Status Intact 08/10/21 0800                         Wound Instructions:  Orders Placed This Encounter   Procedures    Wound cleansing and dressings     Right ankle wound:     Wash your hands with soap and water  Remove old dressing, discard into plastic bag and place in trash  Cleanse the wound with normal saline or mild unscented soap and water prior to applying a clean dressing  Do not use tissue or cotton balls  Do not scrub the wound   Pat dry using gauze      Mupirocin to the open pustules on the RLE, cover open areas on ankle with ABD and wrap with coflex TLC       This was done today at the wound care center  See back in 1 week     Standing Status:   Future     Standing Expiration Date:   8/10/2022    Wound compression and edema control     Multi-layer compression wrap Instructions     Keep compression wrap/wraps clean and dry  If wraps are too tight and you experience numbness/tingling, call the wound center  If after hours, remove wraps or proceed to nearest E R  and call wound center in AM      Avoid prolonged standing in one place      Elevate leg(s) above the level of the heart when sitting or as much as possible  Standing Status:   Future     Standing Expiration Date:   7/92/6856    Cast Application     This order was created via procedure documentation        Diagnosis ICD-10-CM Associated Orders   1   Chronic venous hypertension (idiopathic) with ulcer of right lower extremity (HCC)  I87 [id number] lidocaine (XYLOCAINE) 4 % topical solution 5 mL    [id number] Wound cleansing and dressings     Wound compression and edema control     Cast Application

## 2021-08-10 NOTE — ASSESSMENT & PLAN NOTE
Original wound is much improved, almost closed but there to new areas that are open at the border of where the optilock was   there also scattered pustules noted on the right lower extremity below the knee  Wound management as below with mupirocin on the leg   continue compression with coflex   keep leg elevated as much as possible   Followup in 1 week or call sooner with questions or concerns

## 2021-08-16 ENCOUNTER — OFFICE VISIT (OUTPATIENT)
Dept: HEMATOLOGY ONCOLOGY | Facility: CLINIC | Age: 66
End: 2021-08-16
Payer: COMMERCIAL

## 2021-08-16 VITALS
TEMPERATURE: 97.3 F | DIASTOLIC BLOOD PRESSURE: 84 MMHG | WEIGHT: 305.5 LBS | BODY MASS INDEX: 37.2 KG/M2 | HEIGHT: 76 IN | HEART RATE: 76 BPM | RESPIRATION RATE: 18 BRPM | SYSTOLIC BLOOD PRESSURE: 144 MMHG | OXYGEN SATURATION: 96 %

## 2021-08-16 DIAGNOSIS — C82.90 FOLLICULAR LYMPHOMA, UNSPECIFIED FOLLICULAR LYMPHOMA TYPE, UNSPECIFIED BODY REGION (HCC): Primary | ICD-10-CM

## 2021-08-16 PROCEDURE — 99214 OFFICE O/P EST MOD 30 MIN: CPT | Performed by: INTERNAL MEDICINE

## 2021-08-16 RX ORDER — HYDROCHLOROTHIAZIDE 25 MG/1
25 TABLET ORAL DAILY
COMMUNITY
Start: 2021-07-18 | End: 2021-09-15 | Stop reason: ALTCHOICE

## 2021-08-16 RX ORDER — LOSARTAN POTASSIUM 100 MG/1
100 TABLET ORAL DAILY
COMMUNITY
Start: 2021-07-18 | End: 2021-09-15 | Stop reason: SDUPTHER

## 2021-08-16 RX ORDER — METOPROLOL SUCCINATE 50 MG/1
50 TABLET, EXTENDED RELEASE ORAL EVERY EVENING
COMMUNITY
Start: 2021-08-13

## 2021-08-17 ENCOUNTER — OFFICE VISIT (OUTPATIENT)
Dept: WOUND CARE | Facility: CLINIC | Age: 66
End: 2021-08-17
Payer: COMMERCIAL

## 2021-08-17 VITALS
RESPIRATION RATE: 18 BRPM | SYSTOLIC BLOOD PRESSURE: 178 MMHG | TEMPERATURE: 97.2 F | HEART RATE: 88 BPM | DIASTOLIC BLOOD PRESSURE: 86 MMHG

## 2021-08-17 DIAGNOSIS — L97.919 CHRONIC VENOUS HYPERTENSION (IDIOPATHIC) WITH ULCER OF RIGHT LOWER EXTREMITY (HCC): Primary | ICD-10-CM

## 2021-08-17 DIAGNOSIS — R21 RASH: ICD-10-CM

## 2021-08-17 DIAGNOSIS — I87.311 CHRONIC VENOUS HYPERTENSION (IDIOPATHIC) WITH ULCER OF RIGHT LOWER EXTREMITY (HCC): Primary | ICD-10-CM

## 2021-08-17 PROCEDURE — 99214 OFFICE O/P EST MOD 30 MIN: CPT | Performed by: FAMILY MEDICINE

## 2021-08-17 PROCEDURE — 99213 OFFICE O/P EST LOW 20 MIN: CPT | Performed by: FAMILY MEDICINE

## 2021-08-17 PROCEDURE — G0463 HOSPITAL OUTPT CLINIC VISIT: HCPCS | Performed by: FAMILY MEDICINE

## 2021-08-17 NOTE — PROGRESS NOTES
HEMATOLOGY / ONCOLOGY CLINIC NOTE    Primary Care Provider: Lele Escobar MD  Referring Provider:    MRN: [id number]  : 1955    Reason for Encounter:    Chief Complaint   Patient presents with    Follow-up     follicular lymphoma of intra-abdominal lymph nodes         History of Hematology / Oncology Illness:     Dave Crow is a 60 y o  male who came in  to establish care with oncology    1, follicular lymphoma    - incidentally diagnosed while doing imaging for aortic aneurysm, showed retroperitoneum and mesenteric lymphadenopathy, largest lymph node about 3 cm, status post biopsy showed follicular lymphoma; grade 1-2;   - 2021 :  Developed a big hematoma post biopsy  Hospitalized  Hemoglobin dropped to 7 8 then stable  -  2021 : Hb recovered  PET showed moderate tumor burden  Does not fit GELF criteria for treatment; FLIPI scaore = 2, age +  Stage 3  PET CT showed low SUV  Decided to observe  Assessment / Plan:      1  Follicular lymphoma of intra-abdominal lymph nodes, unspecified follicular lymphoma type ([facility] [address] )    -   Had a long discussion with [t regarding all options; made him aware this is low grade lymphoma, overall this is not curable condition treatment options at this point include observation versus single agent Rituxan  Made patient aware treatment not prolong overall survival, treatment however has a benefit for progression-free survival   After discussed with patient regarding risks and benefit patient like to continue observation at this point  The patient aware to watch for constitutional symptoms, bulky lymph nodes  Patient will check labs every 4 months repeat CT scan in 8 months and follow patient after    Overall condition remains stable at this point will follow patient on yearly basis        - LD,Blood; Standing  - Comprehensive metabolic panel; Standing  - CBC and differential; Standing  - LD,Blood  - Comprehensive metabolic panel  - CBC and differential  - CT chest abdomen pelvis w contrast; Future        Made patient aware regarding side effects of chemotherapy, including but not limited to fatigue cytopenia, increased risk for infection, potential kidney, liver injuries neuropathies et al    Made patient aware to call MD or go to ED for any fever,  Chills, bleeding, easy bruise, unhealed wound, chest pain, abdominal pain et al                       minutes were spent face to face with patient with greater than 50% of the time spent in counseling or coordination of care including discussions of treatment instructions  All of the patient's questions were answered to their satisfactory during this discussion  Advised pt to call if there is any further questions  Interval History:       7/16/2021 :  Patient came in follow-up, recently hospitalized for hematoma  Condition has been stable  No other active bleeding  Subjective patient has no constitutional symptoms, no subjective palpable lumps bumps reported      8/16/2021 :  Patient came for follow-up, subjectively has been doing okay no lumps bumps  No constitutional symptoms    Overall feeling well       Problem list:       Patient Active Problem List   Diagnosis    Diabetes 1 5, managed as type 2 ([facility] )    Hypertension    Hypercholesteremia    Hammer toe of right foot    Stasis dermatitis of both legs    Diabetic peripheral neuropathy ([facility] )    Gout    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[facility] )    Obesity with body mass index 30 or greater    Type 2 diabetes mellitus (HCC)    Retroperitoneal hematoma    Mesenteric lymphadenopathy    Acute blood loss anemia    Heart murmur    GI bleed    Pleural effusion    Chronic venous hypertension (idiopathic) with ulcer of right lower extremity (HCC)         PHYSICIAL EXAMINATION:     Vital Signs:   /84 (BP Location: Left arm, Cuff Size: Large)   Pulse 76   Temp (!) 97 3 °F (36 3 °C)   Resp 18   Ht 6' 4" (1 93 m)   Wt (!) 139 kg (305 lb 8 oz)   SpO2 96%   BMI 37 19 kg/m²   Body surface area is 2 66 meters squared  Ht Readings from Last 3 Encounters:   08/16/21 6' 4" (1 93 m)   08/03/21 6' 4" (1 93 m)   07/16/21 6' 4" (1 93 m)       Wt Readings from Last 3 Encounters:   08/16/21 (!) 139 kg (305 lb 8 oz)   08/03/21 (!) 138 kg (305 lb)   07/16/21 (!) 139 kg (306 lb 8 oz)        Temp Readings from Last 3 Encounters:   08/16/21 (!) 97 3 °F (36 3 °C)   08/10/21 (!) 97 2 °F (36 2 °C)   08/06/21 (!) 97 2 °F (36 2 °C)        BP Readings from Last 3 Encounters:   08/16/21 144/84   08/10/21 161/83   08/06/21 (!) 191/92         Pulse Readings from Last 3 Encounters:   08/16/21 76   08/10/21 72   08/06/21 72       No new adenopathy, right lower extremity lesion is improving  GEN: Alert, awake oriented x3, in no acute distress  HEENT- No pallor, icterus, cyanosis, no oral mucosal lesions,   LAD - no palpable cervical, clavicle, axillary, inguinal LAD  Heart- normal S1 S2, regular rate and rhythm, No murmur, rubs  Lungs- decreased breathing sound bilateral    Abdomen- soft, Non tender, bowel sounds present  Extremities- No cyanosis, clubbing, edema  Neuro- No focal neurological deficit           PAST MEDICAL HISTORY:   has a past medical history of Diabetes mellitus ([facility] [address] ), High cholesterol, and Hypertension  PAST SURGICAL HISTORY:   has a past surgical history that includes Toe Osteotomy (Right, 3/14/2017); Incision and drainage of wound (Right, 10/5/2016); PILONIDAL CYST EXCISION; pr repair of hammertoe,one (Right, 2/19/2019); Toe Osteotomy (Left, 11/24/2020); Bunionectomy (Right, 11/24/2020); Tonsillectomy (1963); IR biopsy lymph node (7/8/2021); and IR embolization (specify vessel or site) (7/8/2021)      CURRENT MEDICATIONS:     Current Outpatient Medications   Medication Sig Dispense Refill    aspirin 81 mg chewable tablet Chew 81 mg daily      metFORMIN (GLUCOPHAGE) 500 mg tablet Take 1,000 mg by mouth 2 (two) times a day with meals      metoprolol succinate (TOPROL-XL) 50 mg 24 hr tablet Take 50 mg by mouth every evening      Multiple Vitamin (multivitamin) tablet Take 1 tablet by mouth daily      pantoprazole (PROTONIX) 40 mg tablet Take 1 tablet (40 mg total) by mouth daily 30 tablet 2    simvastatin (ZOCOR) 40 mg tablet Take 40 mg by mouth daily at bedtime      allopurinol (ZYLOPRIM) 100 mg tablet Take 2 tablets (200 mg total) by mouth daily 30 tablet 0    hydrochlorothiazide (HYDRODIURIL) 25 mg tablet Take 25 mg by mouth daily (Patient not taking: Reported on 8/16/2021)      losartan (COZAAR) 100 MG tablet Take 100 mg by mouth daily (Patient not taking: Reported on 8/16/2021)       No current facility-administered medications for this visit  [unfilled]    SOCIAL HISTORY:   reports that he has never smoked  He has never used smokeless tobacco  He reports that he does not drink alcohol and does not use drugs  FAMILY HISTORY:  family history includes Cancer in his father  ALLERGIES:  is allergic to lisinopril  REVIEW OF SYSTEMS:  Please note that a 14-point review of systems was performed to include Constitutional, HEENT, Respiratory, CVS, GI, , Musculoskeletal, Integumentary, Neurologic, Rheumatologic, Endocrinologic, Psychiatric, Lymphatic, and Hematologic/Oncologic systems were reviewed and are negative unless otherwise stated in HPI  Positive and negative findings pertinent to this evaluation are incorporated into the history of present illness              LAB:    Lab Results   Component Value Date    WBC 6 06 08/10/2021    HGB 11 2 (L) 08/10/2021    HCT 37 4 08/10/2021    MCV 89 08/10/2021     08/10/2021       Lab Results   Component Value Date     10/06/2015    SODIUM 137 08/10/2021    K 4 2 08/10/2021     08/10/2021    CO2 29 08/10/2021    ANIONGAP 7 10/06/2015    AGAP 2 (L) 08/10/2021    BUN 20 08/10/2021    CREATININE 0 86 08/10/2021    GLUC 114 07/19/2021    GLUF 93 08/10/2021    CALCIUM 9 7 08/10/2021    AST 24 08/10/2021    ALT 36 08/10/2021    ALKPHOS 75 08/10/2021    PROT 6 8 10/06/2015    TP 7 7 08/10/2021    BILITOT 0 44 10/06/2015    TBILI 0 36 08/10/2021    EGFR 92 08/10/2021       CBC with diff:   Results from last 7 days   Lab Units 08/10/21  1503   WBC Thousand/uL 6 06   HEMOGLOBIN g/dL 11 2*   HEMATOCRIT % 37 4   MCV fL 89   PLATELETS Thousands/uL 199   MCH pg 26 7*   MCHC g/dL 29 9*   RDW % 15 7*   MPV fL 11 0   NRBC AUTO /100 WBCs 0       CMP:  Results from last 7 days   Lab Units 08/10/21  1503   POTASSIUM mmol/L 4 2   CHLORIDE mmol/L 106   CO2 mmol/L 29   BUN mg/dL 20   CREATININE mg/dL 0 86   CALCIUM mg/dL 9 7   AST U/L 24   ALT U/L 36   ALK PHOS U/L 75   EGFR ml/min/1 73sq m 92       IMAGING:    CT chest abdomen pelvis w contrast    (Results Pending)     Echo complete with contrast if indicated    Result Date: 2021  Narrative: [address] [facility] [phone] Transthoracic Echocardiogram 2D, M-mode, Doppler, and Color Doppler Study date:  2021 Patient: Micheline Pugh MR number: [id number] Account number: [de-identified] : 1955 Age: 60 years Gender: Male Status: Outpatient Location: [facility] Height: 76 in Weight: 298 3 lb BP: 126/ 82 mmHg Indications: AAA with out rupture    Diagnoses: I71 2 - Thoracic aortic aneurysm, without rupture Sonographer:  Scheryl Brunner, RCS Primary Physician:  Marcello Norton MD Referring Physician:  Marcello Norton MD Group:  [facility] Interpreting Physician:  Anni Cavazos MD SUMMARY LEFT VENTRICLE: Systolic function was normal  Ejection fraction was estimated to be 60 %  There were no regional wall motion abnormalities  Concentric hypertrophy was present  Doppler parameters were consistent with abnormal left ventricular relaxation (grade 1 diastolic dysfunction)  RIGHT VENTRICLE: The ventricle was mildly dilated   Systolic function was normal  LEFT ATRIUM: The atrium was mildly dilated  RIGHT ATRIUM: The atrium was mildly dilated  MITRAL VALVE: There was moderate annular calcification  TRICUSPID VALVE: There was trace regurgitation  Pulmonary artery systolic pressure was within the normal range  PULMONIC VALVE: There was trace regurgitation  AORTA: The root exhibited upper limit of normal size (3 8 cm)  HISTORY: PRIOR HISTORY: Risk factors: hypertension, oral hypoglycemic-treated diabetes, and medication-treated hypercholesterolemia  PROCEDURE: The study was performed in the [address] [place] [address]  This was a routine study  The transthoracic approach was used  The study included complete 2D imaging, M-mode, complete spectral Doppler, and color Doppler  The heart rate was 66 bpm, at the start of the study  This was a technically difficult study  LEFT VENTRICLE: Size was normal  Systolic function was normal  Ejection fraction was estimated to be 60 %  There were no regional wall motion abnormalities  Concentric hypertrophy was present  No evidence of apical thrombus  DOPPLER: Doppler parameters were consistent with abnormal left ventricular relaxation (grade 1 diastolic dysfunction)  RIGHT VENTRICLE: The ventricle was mildly dilated  Systolic function was normal  Wall thickness was normal  LEFT ATRIUM: The atrium was mildly dilated  RIGHT ATRIUM: The atrium was mildly dilated  MITRAL VALVE: There was moderate annular calcification  There was calcification of the anterior leaflet  There was normal leaflet separation  DOPPLER: The transmitral velocity was within the normal range  There was no evidence for stenosis  There was no significant regurgitation  AORTIC VALVE: The valve was not visualized well enough to rule out a bicuspid morphology  Leaflets exhibited mildly to moderately increased thickness and normal cuspal separation  DOPPLER: Transaortic velocity was within the normal range  There was no evidence for stenosis  There was no significant regurgitation   TRICUSPID VALVE: The valve structure was normal  There was normal leaflet separation  DOPPLER: The transtricuspid velocity was within the normal range  There was no evidence for stenosis  There was trace regurgitation  Pulmonary artery systolic pressure was within the normal range  PULMONIC VALVE: Leaflets exhibited normal thickness, no calcification, and normal cuspal separation  DOPPLER: The transpulmonic velocity was within the normal range  There was trace regurgitation  PERICARDIUM: There was no pericardial effusion  The pericardium was normal in appearance  AORTA: The root exhibited upper limit of normal size (3 8 cm)  SYSTEMIC VEINS: IVC: The inferior vena cava was normal in size  SYSTEM MEASUREMENT TABLES 2D %FS: 46 54 % Ao Diam: 3 83 cm EDV(Teich): 78 78 ml EF(Teich): 78 29 % ESV(Teich): 17 11 ml IVSd: 1 43 cm LA Area: 21 83 cm2 LA Diam: 4 18 cm LVEDV MOD A4C: 107 4 ml LVEF MOD A4C: 48 1 % LVESV MOD A4C: 55 74 ml LVIDd: 4 2 cm LVIDs: 2 25 cm LVLd A4C: 8 22 cm LVLs A4C: 7 29 cm LVOT Diam: 2 72 cm LVPWd: 1 46 cm RA Area: 23 57 cm2 RVIDd: 4 33 cm RWT: 0 7 SV MOD A4C: 51 66 ml SV(Teich): 61 67 ml CW AV Env  Ti: 283 16 ms AV MaxP 49 mmHg AV VTI: 50 96 cm AV Vmax: 2 52 m/s AV Vmean: 1 8 m/s AV meanP 64 mmHg TR MaxP 97 mmHg TR Vmax: 2 29 m/s MM TAPSE: 2 45 cm PW IAIN (VTI): 2 39 cm2 IAIN Vmax: 2 26 cm2 AVAI (VTI): 0 cm2/m2 AVAI Vmax: 0 cm2/m2 E': 0 07 m/s E/E': 11 39 LVOT Env  Ti: 304 47 ms LVOT VTI: 21 cm LVOT Vmax: 0 98 m/s LVOT Vmean: 0 69 m/s LVOT maxPG: 3 84 mmHg LVOT meanP 24 mmHg LVSI Dopp: 46 37 ml/m2 LVSV Dopp: 121 96 ml MV A [place]: 1 02 m/s MV Dec [place]: 2 51 m/s2 MV DecT: 309 55 ms MV E [place]: 0 78 m/s MV E/A Ratio: 0 76 MV PHT: 89 77 ms MVA By PHT: 2 45 cm2 [employer]tersoci[employer] Commission Accredited Echocardiography Laboratory Prepared and electronically signed by Jameson Chen MD Signed 2021 15:24:46     EGD    Result Date: 2021  Narrative:  [facility] [address] [facility] [address] [phone] DATE OF SERVICE: 6/28/21 PHYSICIAN(S): Edmundo Theodore MD - Attending Physician INDICATION: Abnormal CT of the chest, Lymphadenopathy, abdominal POST-OP DIAGNOSIS: See the impression below  PREPROCEDURE: Informed consent was obtained for the procedure, including sedation  Risks of perforation, hemorrhage, adverse drug reaction and aspiration were discussed  The patient was placed in the left lateral decubitus position  Patient was explained about the risks and benefits of the procedure  Risks including but not limited to bleeding, infection, and perforation were explained in detail  Also explained about less than 100% sensitivity with the exam and other alternatives  DETAILS OF PROCEDURE: Patient was taken to the procedure room where a time out was performed to confirm correct patient and correct procedure  The patient underwent monitored anesthesia care, which was administered by an anesthesia professional  The patient's blood pressure, heart rate, level of consciousness, respirations and oxygen were monitored throughout the procedure  The scope was advanced to the second part of the duodenum  Retroflexion was performed in the fundus  The patient experienced no blood loss  The procedure was not difficult  The patient tolerated the procedure well  There were no apparent complications   ANESTHESIA INFORMATION: ASA: III Anesthesia Type: IV Sedation with Anesthesia MEDICATIONS: No administrations occurring from 0813 to 0822 on 06/28/21 FINDINGS: The upper third of the esophagus, middle third of the esophagus and lower third of the esophagus appeared normal  Grade C esophagitis with mucosal breaks measuring 5 mm or more, continuous between folds, covering less than 75% of the circumference in the GE junction; performed cold forceps biopsy Mild, patchy erythematous and friable mucosa in the body of the stomach and antrum; performed cold forceps biopsy The duodenal bulb, 1st part of the duodenum and 2nd part of the duodenum appeared normal  Performed random biopsy  SPECIMENS: * No specimens in log *     Impression: LA grade C reflux esophagitis Mild erosive gastritis RECOMMENDATION: Follow up with PCP Await pathology Reflux precautions PPI course Weight loss  Charan Frances MD     Colonoscopy    Result Date: 6/28/2021  Narrative:  [id number] [facility] [address] [facility] [place] [address] [phone] DATE OF SERVICE: 6/28/21 PHYSICIAN(S): Charan Frances MD - Attending Physician INDICATION: Abnormal CT of the chest, Lymphadenopathy, abdominal, Screening for malignant neoplasm of colon Colonoscopy performed for a screening indication  POST-OP DIAGNOSIS: See the impression below  HISTORY: Prior colonoscopy: No prior colonoscopy  BOWEL PREPARATION: Magnesium/Sodium Sulfate (Miralax, Suprep) PREPROCEDURE: Informed consent was obtained for the procedure, including sedation  Risks including but not limited to bleeding, infection, perforation, adverse drug reaction and aspiration were explained in detail  Also explained about less than 100% sensitivity with the exam and other alternatives  The patient was placed in the left lateral decubitus position  DETAILS OF PROCEDURE: Patient was taken to the procedure room where a time out was performed to confirm correct patient and correct procedure  The patient underwent monitored anesthesia care, which was administered by an anesthesia professional  The patient's blood pressure, heart rate, level of consciousness, oxygen and respirations were monitored throughout the procedure  A digital rectal exam was performed  The scope was introduced through the anus and advanced to the cecum  Retroflexion was performed in the rectum  The quality of bowel preparation was evaluated using the [facility] Bowel Preparation Scale with scores of: right colon = 2, transverse colon = 2, left colon = 2  The total BBPS score was 6   Bowel prep was adequate  The patient experienced no blood loss  The procedure was not difficult  The patient tolerated the procedure well  There were no apparent complications  ANESTHESIA INFORMATION: ASA: III Anesthesia Type: IV Sedation with Anesthesia MEDICATIONS: No administrations occurring from 0813 to 0839 on 06/28/21 FINDINGS: Few small, mild scattered diverticula with no bleeding in the descending colon and sigmoid colon All observed locations appeared normal, including the cecum, ascending colon, hepatic flexure, transverse colon, splenic flexure, rectosigmoid and rectum  EVENTS: Procedure Events Event Event Time ENDO SCOPE OUT TIME 6/28/2021  8:38 AM SPECIMENS: ID Type Source Tests Collected by Time Destination 1 :  Tissue Duodenum TISSUE EXAM Melissa Ny MD 6/28/2021  8:23 AM  2 :  Tissue Stomach TISSUE EXAM Melissa Ny MD 6/28/2021  8:24 AM  3 : distal Tissue Esophagus TISSUE EXAM Melissa Ny MD 6/28/2021  8:33 AM  EQUIPMENT: [id number]     Impression: Diverticulosis RECOMMENDATION: High-fiber diet Await IR biopsy of lymph node Repeat screening colonoscopy in 10 years for colon cancer screening Yemi Christina III, MD     CT abdomen pelvis wo contrast    Result Date: 7/10/2021  Narrative: CT ABDOMEN AND PELVIS WITHOUT IV CONTRAST INDICATION:   Abdominal pain, acute, nonlocalized h/o retroperitoneal bleed, recent IR procedure, recurring back pain  COMPARISON:  7/8/2021  TECHNIQUE:  CT examination of the abdomen and pelvis was performed without intravenous contrast   Axial, sagittal, and coronal 2D reformatted images were created from the source data and submitted for interpretation  Radiation dose length product (DLP) for this visit:  2460 01 mGy-cm     This examination, like all CT scans performed in the [facility], was performed utilizing techniques to minimize radiation dose exposure, including the use of iterative reconstruction and automated exposure control  Enteric contrast was not administered  FINDINGS: ABDOMEN LOWER CHEST:  Interval development of small left pleural effusion with lower lobe and and lingular atelectasis  LIVER/BILIARY TREE:  Unremarkable  GALLBLADDER:  Vicarious excretion of contrast into the gallbladder lumen noted  No acute changes  SPLEEN:  Unremarkable  PANCREAS:  Unremarkable  ADRENAL GLANDS:  Unremarkable  KIDNEYS/URETERS:  No hydronephrosis or perinephric collection  Tiny nonobstructing calculus left lower pole  Anterior displacement of the left kidney secondary to subacute retroperitoneal hematoma stable  STOMACH AND BOWEL:  Unremarkable  APPENDIX:  A normal appendix was visualized  ABDOMINOPELVIC CAVITY:  Stable large left-sided retroperitoneal hematoma centered in the iliopsoas muscle with inflammatory changes in the left paracolic gutter extending into the pelvis  Gelfoam within the hemorrhage again noted  Minimal peritoneum with minimal presacral edema now identified  Retroperitoneal and pelvic lymphadenopathy stable  VESSELS:  Unremarkable for patient's age  PELVIS REPRODUCTIVE ORGANS:  Unremarkable for patient's age  URINARY BLADDER:  Digital contrast in the bladder noted with no perivesical inflammation  ABDOMINAL WALL/INGUINAL REGIONS:  Fat-containing inguinal hernias again identified with stable right greater than left inguinal lymph nodes noted  Post procedural changes in the right inguinal region noted  OSSEOUS STRUCTURES:  No acute fracture or destructive osseous lesion  Impression: 1  Redemonstration of large left retroperitoneal hematoma involving the iliopsoas muscle with no interval increase in size  No new hemorrhage identified  2   Stable retroperitoneal and pelvic lymphadenopathy  3   Small left pleural effusion with basilar atelectasis   Workstation performed: [id number]     CTA abdomen pelvis w wo contrast    Result Date: 7/9/2021  Narrative: CT ANGIOGRAM OF THE ABDOMEN AND PELVIS WITH AND WITHOUT IV CONTRAST INDICATION:  Recent left para-aortic lymph node biopsy with hypotension  COMPARISON: CT, dated 7/8/2021  CT, dated 6/18/2021  TECHNIQUE:  CT angiogram examination of the abdomen and pelvis was performed according to standard protocol  This examination, like all CT scans performed in the [facility], was performed utilizing techniques to minimize radiation dose exposure, including the use of iterative reconstruction and automated exposure control  Contrast as well as noncontrast images were obtained  Rad dose 4859 55 mGy-cm IV Contrast:  100 mL of iohexol (OMNIPAQUE) Enteric Contrast:  None  FINDINGS: VASCULAR STRUCTURES:  ABDOMINAL VASCULAR STRUCTURES:   AORTA: The abdominal aorta is normal in caliber  There are mild atherosclerotic calcifications  CELIAC ARTERY: The origin is normal in caliber  Branching anatomy is conventional   There is no atherosclerotic disease  SUPERIOR MESENTERIC ARTERY: Normal caliber, without atherosclerotic calcifications  INFERIOR MESENTERIC ARTERY: The ostia and visualized branches are normal  RIGHT RENAL ARTERY: The single renal artery is normal in caliber  LEFT RENAL ARTERY: The single renal artery is normal in caliber  LEFT LOWER EXTREMITY: LEFT COMMON ILIAC ARTERY: Normal in caliber, without atherosclerotic disease  LEFT INTERNAL ILIAC ARTERY: Mild atherosclerotic disease  Visualized branches are normal in caliber  LEFT EXTERNAL ILIAC ARTERY: Normal in caliber, without atherosclerotic disease  LEFT COMMON FEMORAL ARTERY: Normal in caliber, without atherosclerotic disease  RIGHT LOWER EXTREMITY: RIGHT COMMON ILIAC ARTERY: Mild calcific atherosclerotic disease  The vessel is normal in caliber  RIGHT INTERNAL ILIAC ARTERY: Visualized branches are normal, without atherosclerotic disease  RIGHT EXTERNAL ILIAC ARTERY: Normal in caliber, without atherosclerotic disease  RIGHT COMMON FEMORAL ARTERY: Mild calcific atherosclerotic disease    The vessel is normal in caliber  VENOUS: The iliocaval system is normal in caliber, without collateralization  There is the suggestion of active bleeding arising from a left lumbar artery, seen best on coronal series 500, image 145  There is an associated left moderate retroperitoneal hemorrhage  Locules of gas within the hemorrhage relate to Gelfoam administration  OTHER FINDINGS ABDOMEN LOWER CHEST:  No significant abnormality in the lung bases  LIVER/BILIARY TREE:  Unremarkable  GALLBLADDER:  No calcified gallstones  No pericholecystic inflammatory change  SPLEEN:  Unremarkable  Normal size  PANCREAS:  Unremarkable  ADRENAL GLANDS:  7 mm nodule in the right adrenal gland is technically too small to characterize, but statistically most likely to be a benign adenoma  KIDNEYS/URETERS:  No solid renal mass  No hydronephrosis  No urinary tract calculi  PELVIS REPRODUCTIVE ORGANS:  Unremarkable for patient's age  URINARY BLADDER:  Unremarkable  ADDITIONAL ABDOMINAL AND PELVIC STRUCTURES STOMACH AND BOWEL:  Unremarkable  ABDOMINOPELVIC CAVITY:  Redemonstrated are multiple enlarged para-aortic lymph nodes, stable from the prior examination 3 weeks ago  A reference periaortic lymph node measures 19 mm in short axis (series 3, image 74)  Additional enlarged lymph nodes extend through the iliac chain, with an additional right external iliac lymph node measuring 16 mm in short axis (series 3, image 136)  No ascites or free intraperitoneal air  ABDOMINAL WALL/INGUINAL REGIONS:  Unremarkable  OSSEOUS STRUCTURES:  No acute fracture or destructive osseous lesion  Impression: Active bleeding from a left lumbar artery, with associated moderate left retroperitoneal hemorrhage  This finding was identified prior to report generation, and the patient was subsequently taken to arteriography with embolization   Workstation performed: [id number]     IR biopsy lymph node    Result Date: 7/9/2021  Narrative: PROCEDURE: CT-guided left para-aortic lymph node biopsy STAFF: WESLEY Solorio Patience: 1572 37 mGy-cm  This examination, like all CT scans performed in the [facility], was performed utilizing techniques to minimize radiation dose exposure, including the use of iterative reconstruction and automated exposure control  NUMBER OF IMAGES: Multiple  COMPLICATIONS: After the procedure, the patient developed hypertensive urgency  He subsequently developed left leg pain  He he began to develop hypotension  CT was obtained, showing left retroperitoneal hemorrhage  The patient was taken for subsequent embolization,  which showed active bleeding from a left lumbar L2 artery  MEDICATIONS: Versed 2 milligrams iv  Fentanyl 100 micrograms iv  Moderate sedation was monitored by radiology nursing staff  Monitoring of conscious sedation totaled 30 minutes  INDICATION: Concern for lymphoma  PROCEDURE: Under intermittent CT guidance, a 17-gauge coaxial needle was advanced into the left para-aortic lymph node  Under intermittent CT guidance, a total of 6 1 1 cm  core biopsies were obtained  Postprocedural CT was obtained  The tract with embolized with Gelfoam, and the coaxial needle was removed  FINDINGS: 1  Pre-procedural CT showed the dominant 2 cm left para-aortic lymph node  2   Intra-procedural CT confirmed good positioning of the biopsy needle within the left para-aortic lymph node  3   The in-room pathology team confirmed adequacy of samples  4   Post-procedural CT showed a small developing left retroperitoneal hematoma  Impression: CT-guided left para-aortic lymph node biopsy  Workstation performed: [id number]     CT abdomen pelvis w contrast    Result Date: 6/18/2021  Narrative: CT ABDOMEN AND PELVIS WITH IV CONTRAST INDICATION:   R59 0: Localized enlarged lymph nodes    Per prior report, retroperitoneal and mesenteric adenopathy and splenic lesions seen on CT of the chest   Chest CT obtained for thoracic aneurysm  No reported known history of malignancy  No prior surgery  COMPARISON:  5/28/2021 CTA of the chest TECHNIQUE:  CT examination of the abdomen and pelvis was performed  Axial, sagittal, and coronal 2D reformatted images were created from the source data and submitted for interpretation  Radiation dose length product (DLP) for this visit:  1842 mGy-cm   This examination, like all CT scans performed in the [facility], was performed utilizing techniques to minimize radiation dose exposure, including the use of iterative reconstruction and automated exposure control  IV Contrast:  100 mL of iohexol (OMNIPAQUE) Enteric Contrast:  Enteric contrast was not administered  FINDINGS: ABDOMEN LOWER CHEST:  No acute findings  Coronary artery and valve calcification  LIVER/BILIARY TREE:  Within normal limits  GALLBLADDER:  Within normal limits  SPLEEN:  Top normal in size  Preserved morphology  Scattered, ill-defined hypoattenuating nodules  Hilar splenule  PANCREAS:  Within normal limits  ADRENAL GLANDS:  Within normal limits  KIDNEYS/URETERS:  Small bilateral hypodense cortical lesions too small to characterize, statistically likely cysts  No acute findings  STOMACH AND BOWEL:  Within normal limits  APPENDIX:  Within normal limits  ABDOMINOPELVIC CAVITY:  Enlarged mesenteric nodes measure up to 3 5 cm short axis  Smaller, but still enlarged portacaval, retroperitoneal and bilateral iliac chain nodes measure up to 1 7 cm  Femoral nodes up to 1 8 cm  Nodes are bilateral and symmetric  None are necrotic or calcified  No abnormal fluid or air  VESSELS:  Atherosclerotic changes  No aneurysm or acute finding  PELVIS: REPRODUCTIVE ORGANS:  prostate and seminal vesicles within normal limits  URINARY BLADDER:  Within normal limits  ABDOMINAL WALL/INGUINAL REGIONS:  Within normal limits  OSSEOUS STRUCTURES:  No acute or suspicious findings       Impression: Findings similar to those reported on the recent CTA of the chest mesenteric greater than retroperitoneal lymphadenopathy and hypodense splenic lesions  A systemic process is favored, such as lymphoma and leukemia  Granulomatous disease such as sarcoid  is probably less likely given lack of chest lymphadenopathy  Workstation performed: [id number]     IR embolization (specify vessel or site)    Result Date: 7/9/2021  Narrative: PROCEDURE: 1   Real-time ultrasound-guided access of the right common femoral artery 2  Left lumbar L2 and L3 arteriography, with additional superselective catheterization of the left L2 lumbar artery 3  Coil embolization of active bleeding of the left lumbar L2 artery STAFF: WESLEY Bates  CONTRAST: 30 mL Omnipaque intra-arterial  FLUOROSCOPY TIME: 11 4 minutes  NUMBER OF IMAGES: Multiple COMPLICATIONS: None  MEDICATIONS: Sedation was provided in the form of monitored anesthesia care by the Anesthesia service  Please see their associated records  INDICATION: Recent left para-aortic lymph node biopsy this morning  Interval CT showed left retroperitoneal hemorrhage  PROCEDURE: Real-time ultrasound was used to access the right common femoral artery  An image was stored in PACS  A sheath was placed  A wire and catheter was then used to catheterize the left lumbar L3 artery, and interval arteriography was performed  The catheter was then extended into the left L2 lumbar artery, and interval arteriography was performed  Based upon the results of arteriography, a microwire and microcatheter was extended into the distal left lumbar L2 artery, and interval arteriography was performed  Next, the decision was made to coil embolize the left lumbar L2 artery  This was done utilizing four 2 mm x 7 cm Kaz coils, followed by two 3 mm x 3 cm Kaz coils  Completion arteriography was performed  The wire and catheter were removed  Hemostasis was achieved at the right groin utilizing the Starclose vascular closure device  FINDINGS: 1   Real-time ultrasound showed the right common femoral artery be anechoic and freely compressible 2  Arteriography after catheterization of the left lumbar L3 artery failed to show active bleeding  3   Arteriography after catheterization of the left lumbar L2 artery showed an 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

## 2021-08-17 NOTE — PATIENT INSTRUCTIONS
Orders Placed This Encounter   Procedures    Wound cleansing and dressings     As of today your ankle wound is closed  There are some small openings on the leg  Please  the mupirocin antibiotic ointment from the pharmacy  Apply that once daily and wear compression daily   See back in 1 week     Standing Status:   Future     Standing Expiration Date:   8/17/2022

## 2021-08-17 NOTE — PROGRESS NOTES
Patient ID: Suraj Bejarano is a 60 y o  male Date of Birth 1955     Chief Complaint  Chief Complaint   Patient presents with    Follow Up Wound Care Visit     RLE wound       Allergies  Lisinopril    Assessment:    No problem-specific Assessment & Plan notes found for this encounter  Diagnoses and all orders for this visit:    Chronic venous hypertension (idiopathic) with ulcer of right lower extremity (HCC)  -     Wound cleansing and dressings; Future    Rash  -     mupirocin (BACTROBAN) 2 % ointment;  Apply topically daily              Procedures    Plan:    Original wound is closed  Pustule a rash noted, pustules were drained today with light pressure   mupirocin to be used topically once daily  Compression with compression stocking  Followup in 1 week or call sooner with questions or concerns    Wound 08/03/21 Venous Ulcer Ankle Anterior;Right (Active)   Wound Image Images linked 08/17/21 1502   Wound Description Dry 08/17/21 1502   Delisa-wound Assessment Dry;Hyperpigmented 08/17/21 1502   Wound Length (cm) 0 cm 08/17/21 1502   Wound Width (cm) 0 cm 08/17/21 1502   Wound Depth (cm) 0 cm 08/17/21 1502   Wound Surface Area (cm^2) 0 cm^2 08/17/21 1502   Wound Volume (cm^3) 0 cm^3 08/17/21 1502   Calculated Wound Volume (cm^3) 0 cm^3 08/17/21 1502   Change in Wound Size % 100 08/17/21 1502   Drainage Amount None 08/17/21 1502   Drainage Description ARJUN 08/17/21 1502   Non-staged Wound Description Full thickness 08/17/21 1502   Dressing Status Intact 08/17/21 1502       Wound 08/17/21 Other (comment) Pretibial Proximal;Right (Active)   Wound Image Images linked 08/17/21 1518   Wound Description Beefy red;Eschar 08/17/21 1518   Delisa-wound Assessment Erythema 08/17/21 1518   Wound Length (cm) 9 cm 08/17/21 1518   Wound Width (cm) 17 cm 08/17/21 1518   Wound Depth (cm) 0 1 cm 08/17/21 1518   Wound Surface Area (cm^2) 153 cm^2 08/17/21 1518   Wound Volume (cm^3) 15 3 cm^3 08/17/21 1518   Calculated Wound Volume (cm^3) 15 3 cm^3 08/17/21 1518   Drainage Amount None 08/17/21 1518   Drainage Description ARJUN 08/17/21 1518   Non-staged Wound Description Full thickness 08/17/21 1518   Dressing Status Intact 08/17/21 1518       Wound 08/03/21 Venous Ulcer Ankle Anterior;Right (Active)   Date First Assessed/Time First Assessed: 08/03/21 0850   Pre-Existing Wound: Yes  Primary Wound Type: Venous Ulcer  Location: Ankle  Wound Location Orientation: Anterior;Right  Wound Outcome: Healed       Wound 08/17/21 Other (comment) Pretibial Proximal;Right (Active)   Date First Assessed/Time First Assessed: 08/17/21 1517   Pre-Existing Wound: Yes  Primary Wound Type: Other (comment)  Location: Pretibial  Wound Location Orientation: Proximal;Right       [REMOVED] Wound 10/05/16 Other (Comment) Foot Right abscess lanced in Dr Winnie Nelson office (Removed)   Resolved Date/Resolved Time: 08/03/21 0854  Date First Assessed/Time First Assessed: 10/05/16 1930   Traumatic Wound Type: (c) Other (Comment)  Location: Foot  Wound Location Orientation: Right  Wound Description (Comments): abscess lanced in Dr Adriana Boxer    [REMOVED] Incision 10/05/16 Foot Right (Removed)   Resolved Date/Resolved Time: 08/03/21 0853  Date First Assessed/Time First Assessed: 10/05/16 2239   Location: Foot  Wound Location Orientation: Right  Wound Description (Comments):  Plain Packing 1 piece       [REMOVED] Incision 03/14/17 Foot Right (Removed)   Resolved Date/Resolved Time: 08/03/21 0853  Date First Assessed/Time First Assessed: 03/14/17 1044   Location: Foot  Wound Location Orientation: Right       [REMOVED] Wound 02/19/19 Foot Proximal (Removed)   Resolved Date/Resolved Time: 08/03/21 0854  Date First Assessed/Time First Assessed: 02/19/19 1137   Location: Foot  Wound Location Orientation: Proximal  Incision's 1st Dressing: DRESSING OIL EMULSION 3 X 8 IN (x1), DRESSING SPONGE 4 x 4 SINGLE PACK            [REMOVED] Wound 11/24/20 Foot Left (Removed)   Resolved Date/Resolved Time: 08/03/21 0854  Date First Assessed/Time First Assessed: 11/24/20 1442   Location: Foot  Wound Location Orientation: Left  Incision's 1st Dressing: DRESSING OIL EMULSION 3 X 3 IN, SPONGE GENERAL 2 X 2 3 PLY NS LF, KERLIX, A  [REMOVED] Wound 11/24/20 Foot Right (Removed)   Resolved Date/Resolved Time: 08/03/21 0853  Date First Assessed/Time First Assessed: 11/24/20 1442   Location: Foot  Wound Location Orientation: Right  Incision's 1st Dressing: DRESSING OIL EMULSION 3 X 3 IN, SPONGE GENERAL 2 X 2 3 PLY NS LF, KERLIX,     [REMOVED] Wound 07/08/21 Surgical Catheter entry/exit site Groin Right (Removed)   Resolved Date/Resolved Time: 08/03/21 0854  Date First Assessed/Time First Assessed: 07/08/21 1758   Primary Wound Type: Surgical  Traumatic Wound Type: Catheter entry/exit site  Location: Groin  Wound Location Orientation: Right       Subjective:        Patient presents for followup of right lower extremity venous ulcer  No significant pain or drainage  The wound was covered and protected that a coflex was used past week for compression  Some mild pustules were noted scattered on the leg and mupirocin was used on these areas under the wrap  The following portions of the patient's history were reviewed and updated as appropriate: He  has a past medical history of Diabetes mellitus ([address] ), High cholesterol, and Hypertension    He   Patient Active Problem List    Diagnosis Date Noted    Rash 08/17/2021    Chronic venous hypertension (idiopathic) with ulcer of right lower extremity ([address] ) 08/03/2021    GI bleed 07/19/2021    Pleural effusion 07/19/2021    Heart murmur 07/09/2021    Retroperitoneal hematoma 07/08/2021    Mesenteric lymphadenopathy 07/08/2021    Acute blood loss anemia 07/08/2021    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[address] ) 06/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    Stasis dermatitis of both legs 04/17/2019    Hammer toe of right foot 02/19/2019    Obesity with body mass index 30 or greater 10/31/2016    Diabetes 1 5, managed as type 2 ([facility] ) 10/06/2016    Hypertension 10/06/2016    Hypercholesteremia 10/06/2016    Diabetic peripheral neuropathy ([place] [id number] ) 11/10/2014    Gout 12/06/2007    Type 2 diabetes mellitus ([place] [id number] ) 12/06/2007     He  reports that he has never smoked  He has never used smokeless tobacco  He reports that he does not drink alcohol and does not use drugs  Current Outpatient Medications   Medication Sig Dispense Refill    allopurinol (ZYLOPRIM) 100 mg tablet Take 2 tablets (200 mg total) by mouth daily 30 tablet 0    aspirin 81 mg chewable tablet Chew 81 mg daily      hydrochlorothiazide (HYDRODIURIL) 25 mg tablet Take 25 mg by mouth daily (Patient not taking: Reported on 8/16/2021)      losartan (COZAAR) 100 MG tablet Take 100 mg by mouth daily (Patient not taking: Reported on 8/16/2021)      metFORMIN (GLUCOPHAGE) 500 mg tablet Take 1,000 mg by mouth 2 (two) times a day with meals      metoprolol succinate (TOPROL-XL) 50 mg 24 hr tablet Take 50 mg by mouth every evening      Multiple Vitamin (multivitamin) tablet Take 1 tablet by mouth daily      mupirocin (BACTROBAN) 2 % ointment Apply topically daily 22 g 0    pantoprazole (PROTONIX) 40 mg tablet Take 1 tablet (40 mg total) by mouth daily 30 tablet 2    simvastatin (ZOCOR) 40 mg tablet Take 40 mg by mouth daily at bedtime       No current facility-administered medications for this visit  He is allergic to lisinopril       Review of Systems   Constitutional: Negative for chills and fever  HENT: Negative for congestion and sneezing  Respiratory: Negative for cough  Cardiovascular: Positive for leg swelling  Skin: Positive for wound  Psychiatric/Behavioral: Negative for agitation           Objective:       Wound 08/03/21 Venous Ulcer Ankle Anterior;Right (Active)   Wound Image Images linked 08/17/21 1502   Wound Description Dry 08/17/21 1502   Delisa-wound Assessment Dry;Hyperpigmented 08/17/21 1502 Wound Length (cm) 0 cm 08/17/21 1502   Wound Width (cm) 0 cm 08/17/21 1502   Wound Depth (cm) 0 cm 08/17/21 1502   Wound Surface Area (cm^2) 0 cm^2 08/17/21 1502   Wound Volume (cm^3) 0 cm^3 08/17/21 1502   Calculated Wound Volume (cm^3) 0 cm^3 08/17/21 1502   Change in Wound Size % 100 08/17/21 1502   Drainage Amount None 08/17/21 1502   Drainage Description ARJUN 08/17/21 1502   Non-staged Wound Description Full thickness 08/17/21 1502   Dressing Status Intact 08/17/21 1502       Wound 08/17/21 Other (comment) Pretibial Proximal;Right (Active)   Wound Image Images linked 08/17/21 1518   Wound Description Beefy red;Eschar 08/17/21 1518   Delisa-wound Assessment Erythema 08/17/21 1518   Wound Length (cm) 9 cm 08/17/21 1518   Wound Width (cm) 17 cm 08/17/21 1518   Wound Depth (cm) 0 1 cm 08/17/21 1518   Wound Surface Area (cm^2) 153 cm^2 08/17/21 1518   Wound Volume (cm^3) 15 3 cm^3 08/17/21 1518   Calculated Wound Volume (cm^3) 15 3 cm^3 08/17/21 1518   Drainage Amount None 08/17/21 1518   Drainage Description ARJUN 08/17/21 1518   Non-staged Wound Description Full thickness 08/17/21 1518   Dressing Status Intact 08/17/21 1518       BP (!) 178/86   Pulse 88   Temp (!) 97 2 °F (36 2 °C)   Resp 18     Physical Exam  Constitutional:       General: He is not in acute distress  Appearance: Normal appearance  He is obese  He is not ill-appearing, toxic-appearing or diaphoretic  HENT:      Head: Normocephalic and atraumatic  Right Ear: External ear normal       Left Ear: External ear normal    Eyes:      Conjunctiva/sclera: Conjunctivae normal    Pulmonary:      Effort: Pulmonary effort is normal  No respiratory distress  Musculoskeletal:      Cervical back: Neck supple  Right lower leg: Edema present  Left lower leg: Edema present  Skin:     Findings: Rash present  Rash is pustular  Comments: See wound assessment   Neurological:      Mental Status: He is alert     Psychiatric:         Mood and Affect: Mood normal          Behavior: Behavior normal              Wound 08/03/21 Venous Ulcer Ankle Anterior;Right (Active)   Wound Image   08/17/21 1502   Wound Description Dry 08/17/21 1502   Delisa-wound Assessment Dry;Hyperpigmented 08/17/21 1502   Wound Length (cm) 0 cm 08/17/21 1502   Wound Width (cm) 0 cm 08/17/21 1502   Wound Depth (cm) 0 cm 08/17/21 1502   Wound Surface Area (cm^2) 0 cm^2 08/17/21 1502   Wound Volume (cm^3) 0 cm^3 08/17/21 1502   Calculated Wound Volume (cm^3) 0 cm^3 08/17/21 1502   Change in Wound Size % 100 08/17/21 1502   Drainage Amount None 08/17/21 1502   Drainage Description ARJUN 08/17/21 1502   Non-staged Wound Description Full thickness 08/17/21 1502   Dressing Status Intact 08/17/21 1502       Wound 08/17/21 Other (comment) Pretibial Proximal;Right (Active)   Wound Image    08/17/21 1518   Wound Description Beefy red;Eschar 08/17/21 1518   Delisa-wound Assessment Erythema 08/17/21 1518   Wound Length (cm) 9 cm 08/17/21 1518   Wound Width (cm) 17 cm 08/17/21 1518   Wound Depth (cm) 0 1 cm 08/17/21 1518   Wound Surface Area (cm^2) 153 cm^2 08/17/21 1518   Wound Volume (cm^3) 15 3 cm^3 08/17/21 1518   Calculated Wound Volume (cm^3) 15 3 cm^3 08/17/21 1518   Drainage Amount None 08/17/21 1518   Drainage Description ARJUN 08/17/21 1518   Non-staged Wound Description Full thickness 08/17/21 1518   Dressing Status Intact 08/17/21 1518                       Wound Instructions:  Orders Placed This Encounter   Procedures    Wound cleansing and dressings     As of today your ankle wound is closed  There are some small openings on the leg  Please  the mupirocin antibiotic ointment from the pharmacy  Apply that once daily and wear compression daily  See back in 1 week     Standing Status:   Future     Standing Expiration Date:   8/17/2022        Diagnosis ICD-10-CM Associated Orders   1   Chronic venous hypertension (idiopathic) with ulcer of right lower extremity (HCC)  I87 311 Wound cleansing and dressings    O59 918    2   Rash  R21 mupirocin (BACTROBAN) 2 % ointment

## 2021-08-24 ENCOUNTER — OFFICE VISIT (OUTPATIENT)
Dept: WOUND CARE | Facility: CLINIC | Age: 66
End: 2021-08-24
Payer: COMMERCIAL

## 2021-08-24 VITALS
TEMPERATURE: 97.7 F | DIASTOLIC BLOOD PRESSURE: 78 MMHG | RESPIRATION RATE: 18 BRPM | HEART RATE: 75 BPM | SYSTOLIC BLOOD PRESSURE: 148 MMHG

## 2021-08-24 DIAGNOSIS — L97.919 CHRONIC VENOUS HYPERTENSION (IDIOPATHIC) WITH ULCER OF RIGHT LOWER EXTREMITY (HCC): Primary | ICD-10-CM

## 2021-08-24 DIAGNOSIS — I87.311 CHRONIC VENOUS HYPERTENSION (IDIOPATHIC) WITH ULCER OF RIGHT LOWER EXTREMITY (HCC): Primary | ICD-10-CM

## 2021-08-24 PROCEDURE — G0463 HOSPITAL OUTPT CLINIC VISIT: HCPCS | Performed by: PREVENTIVE MEDICINE

## 2021-08-24 PROCEDURE — 99212 OFFICE O/P EST SF 10 MIN: CPT | Performed by: PREVENTIVE MEDICINE

## 2021-08-24 PROCEDURE — 99213 OFFICE O/P EST LOW 20 MIN: CPT | Performed by: PREVENTIVE MEDICINE

## 2021-08-24 NOTE — PROGRESS NOTES
Patient ID: Alban Niño is a 60 y o  male Date of Birth 1955       Chief Complaint   Patient presents with    Follow Up Wound Care Visit     right leg wound       Allergies:  Lisinopril    Diagnosis:  1  Chronic venous hypertension (idiopathic) with ulcer of right lower extremity (HCC)  -     Wound cleansing and dressings; Future       Diagnosis ICD-10-CM Associated Orders   1  Chronic venous hypertension (idiopathic) with ulcer of right lower extremity (HCC)  I87 311 Wound cleansing and dressings    L97 919         Assessment & Plan:  Healed  Counseled on importance of continued compression, daily emollient application, frequent elevation of leg  F/u prn    Subjective:   F/u RLE ulcer  No complaints   No drainage      The following portions of the patient's history were reviewed and updated as appropriate:   Patient Active Problem List   Diagnosis    Diabetes 1 5, managed as type 2 ([address] )    Hypertension    Hypercholesteremia    Hammer toe of right foot    Stasis dermatitis of both legs    Diabetic peripheral neuropathy ([address] )    Gout    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[address] )    Obesity with body mass index 30 or greater    Type 2 diabetes mellitus (HCC)    Retroperitoneal hematoma    Mesenteric lymphadenopathy    Acute blood loss anemia    Heart murmur    GI bleed    Pleural effusion    Chronic venous hypertension (idiopathic) with ulcer of right lower extremity (HCC)    Rash     Past Medical History:   Diagnosis Date    Diabetes mellitus ([address] )     High cholesterol     Hypertension      Past Surgical History:   Procedure Laterality Date    BUNIONECTOMY Right 11/24/2020    Procedure: RIGHT HAV CORRECTION,;  Surgeon: Manish Villaseñor DPM;  [facility];  Service: Podiatry    INCISION AND DRAINAGE OF WOUND Right 10/5/2016    Procedure: INCISION AND DRAINAGE (I&D) EXTREMITY;  Surgeon: Manish Villaseñor DPM;  [facility];  Service:     IR BIOPSY LYMPH NODE  7/8/2021    IR EMBOLIZATION (SPECIFY VESSEL OR SITE)  7/8/2021    PILONIDAL CYST EXCISION      CT REPAIR OF HAMMERTOE,ONE Right 2/19/2019    Procedure: THIRD HAMMER TOE CORRECTION;  Surgeon: Mike Miller DPM;  [facility];  Service: Podiatry    TOE OSTEOTOMY Right 3/14/2017    Procedure: HAMMERTOE CORRECTION R 2 ;  Surgeon: Mike Miller DPM;  [facility];  Service:     TOE OSTEOTOMY Left 11/24/2020    Procedure: LEFT HT CORRECTION TOE;  Surgeon: Mike Miller DPM;  [facility];  Service: [address]     Social History     Socioeconomic History    Marital status: /Civil Union     Spouse name: None    Number of children: None    Years of education: None    Highest education level: None   Occupational History    None   Tobacco Use    Smoking status: Never Smoker    Smokeless tobacco: Never Used    Tobacco comment: Never smoked but exposed to second hand smoke from birth until 19's   Vaping Use    Vaping Use: Never assessed   Substance and Sexual Activity    Alcohol use: No    Drug use: No    Sexual activity: Not Currently     Partners: Female     Birth control/protection: Abstinence   Other Topics Concern    None   Social History Narrative    None     Social Determinants of Health     Financial Resource Strain:     Difficulty of Paying Living Expenses:    Food Insecurity:     Worried About Running Out of Food in the Last Year:     Ran Out of Food in the Last Year:    Transportation Needs:     Lack of Transportation (Medical):      Lack of Transportation (Non-Medical):    Physical Activity:     Days of Exercise per Week:     Minutes of Exercise per Session:    Stress:     Feeling of Stress :    Social Connections:     Frequency of Communication with Friends and Family:     Frequency of Social Gatherings with Friends and Family:     Attends Congregational Services:     Active Member of Clubs or Organizations:     Attends Club or Organization Meetings:    Gracie Stepehns Marital Status:    Intimate Partner Violence:     Fear of Current or Ex-Partner:     Emotionally Abused:     Physically Abused:     Sexually Abused:         Current Outpatient Medications:     allopurinol (ZYLOPRIM) 100 mg tablet, Take 2 tablets (200 mg total) by mouth daily, Disp: 30 tablet, Rfl: 0    aspirin 81 mg chewable tablet, Chew 81 mg daily, Disp: , Rfl:     hydrochlorothiazide (HYDRODIURIL) 25 mg tablet, Take 25 mg by mouth daily (Patient not taking: Reported on 8/16/2021), Disp: , Rfl:     losartan (COZAAR) 100 MG tablet, Take 100 mg by mouth daily (Patient not taking: Reported on 8/16/2021), Disp: , Rfl:     metFORMIN (GLUCOPHAGE) 500 mg tablet, Take 1,000 mg by mouth 2 (two) times a day with meals, Disp: , Rfl:     metoprolol succinate (TOPROL-XL) 50 mg 24 hr tablet, Take 50 mg by mouth every evening, Disp: , Rfl:     Multiple Vitamin (multivitamin) tablet, Take 1 tablet by mouth daily, Disp: , Rfl:     mupirocin (BACTROBAN) 2 % ointment, Apply topically daily, Disp: 22 g, Rfl: 0    pantoprazole (PROTONIX) 40 mg tablet, Take 1 tablet (40 mg total) by mouth daily, Disp: 30 tablet, Rfl: 2    simvastatin (ZOCOR) 40 mg tablet, Take 40 mg by mouth daily at bedtime, Disp: , Rfl:   Family History   Problem Relation Age of Onset    Cancer Father         Leukemia      Review of Systems   Constitutional: Negative  Respiratory: Negative  Cardiovascular: Negative  Skin: Positive for wound  Objective:  /78   Pulse 75   Temp 97 7 °F (36 5 °C)   Resp 18     Physical Exam  Vitals reviewed  Constitutional:       General: He is not in acute distress  Cardiovascular:      Rate and Rhythm: Normal rate  Pulmonary:      Effort: Pulmonary effort is normal    Skin:     Comments: See wound assessment   Neurological:      General: No focal deficit present  Mental Status: He is alert and oriented to person, place, and time     Psychiatric:         Mood and Affect: Mood normal  Behavior: Behavior normal              Wound 08/03/21 Venous Ulcer Ankle Anterior;Right (Active)   Wound Image   08/24/21 1519   Wound Description Dry;Epithelialization 08/24/21 1520   Delisa-wound Assessment Dry;Hyperpigmented 08/24/21 1520   Wound Length (cm) 0 cm 08/24/21 1520   Wound Width (cm) 0 cm 08/24/21 1520   Wound Depth (cm) 0 cm 08/24/21 1520   Wound Surface Area (cm^2) 0 cm^2 08/24/21 1520   Wound Volume (cm^3) 0 cm^3 08/24/21 1520   Calculated Wound Volume (cm^3) 0 cm^3 08/24/21 1520   Change in Wound Size % 100 08/24/21 1520   Drainage Amount None 08/24/21 1520   Drainage Description ARJUN 08/17/21 1502   Non-staged Wound Description Full thickness 08/24/21 1520   Dressing Status Intact 08/24/21 1520       Wound 08/17/21 Other (comment) Pretibial Proximal;Right (Active)   Wound Image   08/24/21 1520   Wound Description Dry;Epithelialization 08/24/21 1521   Delisa-wound Assessment Erythema 08/17/21 1518   Wound Length (cm) 0 cm 08/24/21 1521   Wound Width (cm) 0 cm 08/24/21 1521   Wound Depth (cm) 0 cm 08/24/21 1521   Wound Surface Area (cm^2) 0 cm^2 08/24/21 1521   Wound Volume (cm^3) 0 cm^3 08/24/21 1521   Calculated Wound Volume (cm^3) 0 cm^3 08/24/21 1521   Change in Wound Size % 100 08/24/21 1521   Drainage Amount None 08/24/21 1521   Drainage Description ARJUN 08/17/21 1518   Non-staged Wound Description Full thickness 08/24/21 1521   Dressing Status Clean;Dry 08/24/21 1521                         Procedures             Wound Instructions:  Orders Placed This Encounter   Procedures    Wound cleansing and dressings     Right leg wound healed  Keep clean and dry Moisturize daily  Compression stockings on in am and off pm  Md instructed to call with any new occurrence of drainage     Standing Status:   Future     Standing Expiration Date:   8/24/2022         Ok Bis, DO      Portions of the record may have been created with voice recognition software   Occasional wrong word or "sound a like" substitutions may have occurred due to the inherent limitations of voice recognition software  Read the chart carefully and recognize, using context, where substitutions have occurred

## 2021-08-24 NOTE — PATIENT INSTRUCTIONS
Orders Placed This Encounter   Procedures    Wound cleansing and dressings     Right leg wound healed  Keep clean and dry Moisturize daily  Compression stockings on in am and off pm  Md instructed to call with any new occurrence of drainage     Standing Status:   Future     Standing Expiration Date:   8/24/2022

## 2021-09-08 ENCOUNTER — TELEPHONE (OUTPATIENT)
Dept: NEPHROLOGY | Facility: CLINIC | Age: 66
End: 2021-09-08

## 2021-09-13 ENCOUNTER — TELEPHONE (OUTPATIENT)
Dept: NEPHROLOGY | Facility: CLINIC | Age: 66
End: 2021-09-13

## 2021-09-13 ENCOUNTER — DOCUMENTATION (OUTPATIENT)
Dept: NEPHROLOGY | Facility: CLINIC | Age: 66
End: 2021-09-13

## 2021-09-13 ENCOUNTER — APPOINTMENT (OUTPATIENT)
Dept: LAB | Facility: HOSPITAL | Age: 66
End: 2021-09-13
Attending: INTERNAL MEDICINE
Payer: MEDICARE

## 2021-09-13 DIAGNOSIS — N17.9 ACUTE KIDNEY INJURY (HCC): ICD-10-CM

## 2021-09-13 LAB
ANION GAP SERPL CALCULATED.3IONS-SCNC: 9 MMOL/L (ref 4–13)
BUN SERPL-MCNC: 19 MG/DL (ref 5–25)
CALCIUM SERPL-MCNC: 9.3 MG/DL (ref 8.3–10.1)
CHLORIDE SERPL-SCNC: 102 MMOL/L (ref 100–108)
CO2 SERPL-SCNC: 30 MMOL/L (ref 21–32)
CREAT SERPL-MCNC: 0.96 MG/DL (ref 0.6–1.3)
GFR SERPL CREATININE-BSD FRML MDRD: 83 ML/MIN/1.73SQ M
GLUCOSE P FAST SERPL-MCNC: 164 MG/DL (ref 65–99)
POTASSIUM SERPL-SCNC: 4.5 MMOL/L (ref 3.5–5.3)
SODIUM SERPL-SCNC: 141 MMOL/L (ref 136–145)

## 2021-09-13 PROCEDURE — 36415 COLL VENOUS BLD VENIPUNCTURE: CPT

## 2021-09-13 PROCEDURE — 80048 BASIC METABOLIC PNL TOTAL CA: CPT

## 2021-09-13 NOTE — TELEPHONE ENCOUNTER
I called Medicare @ [phone] (Automated System) to check eligible for the patient and as of 9/13/2021 the patient has current active coverage as of 9/1/2021  Herbert Enciso         I called [employer] @ [phone] and spoke to Rod () to check eligible for the patient and as of 9/13/2021 the patient has current active coverage as of 9/1/2021  According to Rod () [employer] is secondary to Medicare because it is a supplement plan  The reference number for this call is: [id number]   Herbert Enciso

## 2021-09-13 NOTE — PROGRESS NOTES
Labs ( done on 9/13/2021 )   Creatinine 0 96 with EGFR of 83  Normal sodium, potassium, bicarb and calcium       Martin Doan MD  Nephrology  9/13/2021

## 2021-09-14 ENCOUNTER — TELEPHONE (OUTPATIENT)
Dept: NEPHROLOGY | Facility: CLINIC | Age: 66
End: 2021-09-14

## 2021-09-15 ENCOUNTER — OFFICE VISIT (OUTPATIENT)
Dept: NEPHROLOGY | Facility: CLINIC | Age: 66
End: 2021-09-15
Payer: MEDICARE

## 2021-09-15 VITALS
DIASTOLIC BLOOD PRESSURE: 100 MMHG | BODY MASS INDEX: 39.07 KG/M2 | WEIGHT: 314.2 LBS | TEMPERATURE: 97.1 F | SYSTOLIC BLOOD PRESSURE: 164 MMHG | HEART RATE: 88 BPM | RESPIRATION RATE: 16 BRPM | HEIGHT: 75 IN

## 2021-09-15 DIAGNOSIS — N17.9 AKI (ACUTE KIDNEY INJURY) (HCC): Primary | ICD-10-CM

## 2021-09-15 DIAGNOSIS — N18.2 HYPERTENSIVE KIDNEY DISEASE WITH STAGE 2 CHRONIC KIDNEY DISEASE: ICD-10-CM

## 2021-09-15 DIAGNOSIS — N18.2 STAGE 2 CHRONIC KIDNEY DISEASE: ICD-10-CM

## 2021-09-15 DIAGNOSIS — I12.9 HYPERTENSIVE KIDNEY DISEASE WITH STAGE 2 CHRONIC KIDNEY DISEASE: ICD-10-CM

## 2021-09-15 PROCEDURE — 99214 OFFICE O/P EST MOD 30 MIN: CPT | Performed by: INTERNAL MEDICINE

## 2021-09-15 RX ORDER — LOSARTAN POTASSIUM 100 MG/1
50 TABLET ORAL DAILY
Start: 2021-09-15

## 2021-09-15 NOTE — PROGRESS NOTES
NEPHROLOGY OFFICE FOLLOW UP  Piotr Vincent 65 y o male Date of Birth: @ MRN: [id number]    Encounter: [id number] DATE: 9/15/2021    REASON FOR VISIT: Piotr Vincent is a 60 y o  male who is here on 9/15/2021 hospital follow-up for FINA   HPI:    This is 60-year-old male with past medical history significant for hypertension, diabetes type 2, hyperlipidemia, was recently admitted to [place] [address]  who has return to Nephrology office for hospital follow-up for FINA   Upon review of old medical records, patient was earlier admitted to [place] [address]  where he had underwent biopsy of mesenteric adenopathy which was complicated by retroperitoneal bleeding  Patient had developed FINA which was suspected due to contrast induced nephropathy  Peak creatinine level was 1 7  Upon review of old medical records, baseline creatinine seems to be around 0 9-1  1   During the hospital stay losartan and hydrochlorothiazide were also stop   Patient has underlying hypertension and told me that his blood pressure is lately running on the higher side   Patient has diabetes type 2 and currently taking metformin and according to patient diabetes is currently under well control  Patient takes all the medications as prescribed and denies use of NSAIDs   REVIEW OF SYSTEMS:    Review of Systems   Constitutional: Negative for chills and fever  HENT: Negative for nosebleeds and sore throat  Eyes: Negative for photophobia and pain  Respiratory: Negative for choking and wheezing  Cardiovascular: Negative for chest pain and palpitations  Gastrointestinal: Negative for abdominal pain and blood in stool  Endocrine: Negative for heat intolerance  Genitourinary: Negative for flank pain and hematuria  Musculoskeletal: Negative for joint swelling and neck pain  Skin: Negative for color change and pallor  Neurological: Negative for seizures and syncope     Psychiatric/Behavioral: Negative for confusion and suicidal ideas           PAST MEDICAL HISTORY:  Past Medical History:   Diagnosis Date    Chronic kidney disease     Diabetes mellitus ([facility] )     High cholesterol     Hypertension        PAST SURGICAL HISTORY:  Past Surgical History:   Procedure Laterality Date    BUNIONECTOMY Right 11/24/2020    Procedure: RIGHT HAV CORRECTION,;  Surgeon: Eric Hyde DPM;  [facility];  Service: Podiatry    INCISION AND DRAINAGE OF WOUND Right 10/5/2016    Procedure: INCISION AND DRAINAGE (I&D) EXTREMITY;  Surgeon: Eric Hyde DPM;  [facility];  Service:     IR BIOPSY LYMPH NODE  7/8/2021    IR EMBOLIZATION (SPECIFY VESSEL OR SITE)  7/8/2021    PILONIDAL CYST EXCISION      GA REPAIR OF HAMMERTOE,ONE Right 2/19/2019    Procedure: THIRD HAMMER TOE CORRECTION;  Surgeon: Eric Hyde DPM;  [facility];  Service: Podiatry    TOE OSTEOTOMY Right 3/14/2017    Procedure: HAMMERTOE CORRECTION R 2 ;  Surgeon: Eric Hyde DPM;  [facility];  Service:     TOE OSTEOTOMY Left 11/24/2020    Procedure: LEFT HT CORRECTION TOE;  Surgeon: Eric Hyde DPM;  [facility];  Service: Podiatry    TONSILLECTOMY  1963       SOCIAL HISTORY:  Social History     Substance and Sexual Activity   Alcohol Use No     Social History     Substance and Sexual Activity   Drug Use No     Social History     Tobacco Use   Smoking Status Never Smoker   Smokeless Tobacco Never Used   Tobacco Comment    Never smoked but exposed to second hand smoke from birth until 19's       FAMILY HISTORY:  Family History   Problem Relation Age of Onset    Cancer Father         Leukemia       ALLERGY:  Allergies   Allergen Reactions    Lisinopril Cough       MEDICATIONS:    Current Outpatient Medications:     allopurinol (ZYLOPRIM) 100 mg tablet, Take 2 tablets (200 mg total) by mouth daily, Disp: 30 tablet, Rfl: 0    aspirin 81 mg chewable tablet, Chew 81 mg daily, Disp: , Rfl:     metFORMIN (GLUCOPHAGE) 500 mg tablet, Take 1,000 mg by mouth 2 (two) times a day with meals, Disp: , Rfl:     metoprolol succinate (TOPROL-XL) 50 mg 24 hr tablet, Take 50 mg by mouth every evening, Disp: , Rfl:     Multiple Vitamin (multivitamin) tablet, Take 1 tablet by mouth daily, Disp: , Rfl:     pantoprazole (PROTONIX) 40 mg tablet, Take 1 tablet (40 mg total) by mouth daily, Disp: 30 tablet, Rfl: 2    simvastatin (ZOCOR) 40 mg tablet, Take 40 mg by mouth daily at bedtime, Disp: , Rfl:     losartan (COZAAR) 100 MG tablet, Take 0 5 tablets (50 mg total) by mouth daily, Disp: , Rfl:     PHYSICAL EXAM:  Vitals:    09/15/21 1444   BP: 164/100   BP Location: Left arm   Patient Position: Sitting   Cuff Size: Large   Pulse: 88   Resp: 16   Temp: (!) 97 1 °F (36 2 °C)   TempSrc: Temporal   Weight: (!) 143 kg (314 lb 3 2 oz)   Height: 6' 3" (1 905 m)     Body mass index is 39 27 kg/m²  Physical Exam  Constitutional:       General: He is not in acute distress  HENT:      Head: Normocephalic and atraumatic  Eyes:      General: No scleral icterus  Neck:      Vascular: No JVD  Cardiovascular:      Rate and Rhythm: Normal rate  Heart sounds: S1 normal and S2 normal  Murmur heard  Pulmonary:      Effort: Pulmonary effort is normal  No accessory muscle usage or respiratory distress  Breath sounds: No wheezing  Abdominal:      General: There is no distension  Palpations: Abdomen is soft  Musculoskeletal:      Cervical back: Neck supple  Right ankle: No swelling  Left ankle: No swelling  Comments: Moving all extremities   Skin:     General: Skin is warm  Comments: No jaundice    Neurological:      Mental Status: He is alert  He is not disoriented  Comments: Facial symmetry  Speech is clear   Psychiatric:         Speech: He is communicative  Behavior: Behavior is not combative           LAB RESULTS:  Results for orders placed or performed in visit on 03/17/33   Basic metabolic panel Result Value Ref Range    Sodium 141 136 - 145 mmol/L    Potassium 4 5 3 5 - 5 3 mmol/L    Chloride 102 100 - 108 mmol/L    CO2 30 21 - 32 mmol/L    ANION GAP 9 4 - 13 mmol/L    BUN 19 5 - 25 mg/dL    Creatinine 0 96 0 60 - 1 30 mg/dL    Glucose, Fasting 164 (H) 65 - 99 mg/dL    Calcium 9 3 8 3 - 10 1 mg/dL    eGFR 83 ml/min/1 73sq m       ASSESSMENT and PLAN:  Luis Eduardo Pritchett was seen today for follow-up and acute renal failure  Diagnoses and all orders for this visit:    FINA (acute kidney injury) ([facility] )    Stage 2 chronic kidney disease  -     CBC and differential; Future  -     Basic metabolic panel; Future  -     Urinalysis with microscopic; Future  -     Microalbumin / creatinine urine ratio; Future    Hypertensive kidney disease with stage 2 chronic kidney disease  -     Basic metabolic panel; Future  -     Urinalysis with microscopic; Future  -     Microalbumin / creatinine urine ratio; Future  -     Basic metabolic panel; Future  -     losartan (COZAAR) 100 MG tablet; Take 0 5 tablets (50 mg total) by mouth daily      1  FINA on top of CKD stage 2  Multifactorial   Suspected due to contrast induced nephropathy per     Patient was earlier admitted to [place] [address]  where he had underwent mesenteric lymph node biopsy which was complicated by retroperitoneal bleed  Patient's peak creatinine level was 1 7 with EGFR of 42 ( 7/8/2021 )   Upon review of old medical records, previously known baseline creatinine is around 0 9-1 1 with current creatinine of 0 96 with EGFR of 83 which is now back to baseline  FINA has resolved now  Management of hypertension as mentioned below   Recommend to avoid long-term use of NSAIDs and recheck renal function before next visit   2  Hypertension in chronic kidney disease   During recent hospital stay due to FINA, losartan and hydrochlorothiazide were stopped   Currently patient is taking metoprolol XL 50 mg PO daily and lately blood pressure is elevated and above the goal  Today's blood pressure was also on the higher side  Plan to restart patient on losartan at reduced dose - 50 mg PO daily today  Plan to check BMP in 2 weeks to reassess electrolyte and renal function   Will titrate the dose of losartan in the future as indicated   Advised patient to check blood pressure on regular basis at home  Also advised to follow low-salt diet  Returns to Nephrology office in 9 months  Plan to check CBC, BMP, urine analysis along with urine microalbumin : creatinine ratio before next visit   Labs (reviewed on 11/6/2021)  Creatinine 1 13 with EGFR of 68  Hemoglobin 14 0  Urine analysis showed no dysuria  Urine microalbumin:  Creatinine ratio of 2 114 g-> consider increasing the dose of losartan  Normal sodium, potassium, bicarb and calcium  Labs (done on 7/11/2022)  Creatinine 1 38 with EGFR of 53 (new baseline creatinine seems to be around 1 2-1 4 -> seen by oncologist on 1/19/7789 for follicular lymphoma)  Hemoglobin 12 8  Urine analysis showed no dysuria  Urine microalbumin:  Creatinine ratio of 1 7 g-> consider checking SPEP and UPEP + increase the dose of losartan  Normal uric acid (7 0), PTH (72), sodium, potassium, bicarb, calcium and phosphorus  Portions of the record may have been created with voice recognition software  Occasional wrong word or "sound a like" substitutions may have occurred due to the inherent limitations of voice recognition software  Read the chart carefully and recognize, using context, where substitutions have occurred  If you have any questions, please contact the dictating provider

## 2021-09-15 NOTE — LETTER
September 15, 2021     MD Higinio Goyal [address]  [facility]  [address]    Patient: Dave Crow   YOB: 1955   Date of Visit: 9/15/2021       Dear Dr Riya Rae: Thank you for referring Dave Crow to me for evaluation  Below are my notes for this consultation  If you have questions, please do not hesitate to call me  I look forward to following your patient along with you  Sincerely,        Tom Peralta MD        CC: No Recipients  Tom Peralta MD  9/15/2021  4:28 PM  Sign when Signing Visit  NEPHROLOGY OFFICE FOLLOW UP  Dave Crow 60 y o male Date of Birth: @ MRN: [id number]    Encounter: [id number] DATE: 9/15/2021    REASON FOR VISIT: Dave Crow is a 60 y o  male who is here on 9/15/2021 hospital follow-up for FINA   HPI:    This is 80-year-old male with past medical history significant for hypertension, diabetes type 2, hyperlipidemia, was recently admitted to [place] [address]  who has return to Nephrology office for hospital follow-up for FINA   Upon review of old medical records, patient was earlier admitted to [place] [address]  where he had underwent biopsy of mesenteric adenopathy which was complicated by retroperitoneal bleeding  Patient had developed FINA which was suspected due to contrast induced nephropathy  Peak creatinine level was 1 7  Upon review of old medical records, baseline creatinine seems to be around 0 9-1  1   During the hospital stay losartan and hydrochlorothiazide were also stop   Patient has underlying hypertension and told me that his blood pressure is lately running on the higher side   Patient has diabetes type 2 and currently taking metformin and according to patient diabetes is currently under well control  Patient takes all the medications as prescribed and denies use of NSAIDs   REVIEW OF SYSTEMS:    Review of Systems   Constitutional: Negative for chills and fever  HENT: Negative for nosebleeds and sore throat  Eyes: Negative for photophobia and pain  Respiratory: Negative for choking and wheezing  Cardiovascular: Negative for chest pain and palpitations  Gastrointestinal: Negative for abdominal pain and blood in stool  Endocrine: Negative for heat intolerance  Genitourinary: Negative for flank pain and hematuria  Musculoskeletal: Negative for joint swelling and neck pain  Skin: Negative for color change and pallor  Neurological: Negative for seizures and syncope  Psychiatric/Behavioral: Negative for confusion and suicidal ideas           PAST MEDICAL HISTORY:  Past Medical History:   Diagnosis Date    Chronic kidney disease     Diabetes mellitus ([facility] )     High cholesterol     Hypertension        PAST SURGICAL HISTORY:  Past Surgical History:   Procedure Laterality Date    BUNIONECTOMY Right 11/24/2020    Procedure: RIGHT HAV CORRECTION,;  Surgeon: Clari Castro DPM;  [facility];  Service: Podiatry    INCISION AND DRAINAGE OF WOUND Right 10/5/2016    Procedure: INCISION AND DRAINAGE (I&D) EXTREMITY;  Surgeon: Clari Castro DPM;  [facility];  Service:     IR BIOPSY LYMPH NODE  7/8/2021    IR EMBOLIZATION (SPECIFY VESSEL OR SITE)  7/8/2021    PILONIDAL CYST EXCISION      PA REPAIR OF HAMMERTOE,ONE Right 2/19/2019    Procedure: THIRD HAMMER TOE CORRECTION;  Surgeon: Clari Castro DPM;  [facility];  Service: Podiatry    TOE OSTEOTOMY Right 3/14/2017    Procedure: HAMMERTOE CORRECTION R 2 ;  Surgeon: Clari Castro DPM;  [facility];  Service:     TOE OSTEOTOMY Left 11/24/2020    Procedure: LEFT HT CORRECTION TOE;  Surgeon: Clari Castro DPM;  [facility];  Service: Podiatry    TONSILLECTOMY  1963       SOCIAL HISTORY:  Social History     Substance and Sexual Activity   Alcohol Use No     Social History     Substance and Sexual Activity   Drug Use No     Social History     Tobacco Use   Smoking Status Never Smoker   Smokeless Tobacco Never Used   Tobacco Comment    Never smoked but exposed to second hand smoke from birth until 19's       FAMILY HISTORY:  Family History   Problem Relation Age of Onset    Cancer Father         Leukemia       ALLERGY:  Allergies   Allergen Reactions    Lisinopril Cough       MEDICATIONS:    Current Outpatient Medications:     allopurinol (ZYLOPRIM) 100 mg tablet, Take 2 tablets (200 mg total) by mouth daily, Disp: 30 tablet, Rfl: 0    aspirin 81 mg chewable tablet, Chew 81 mg daily, Disp: , Rfl:     metFORMIN (GLUCOPHAGE) 500 mg tablet, Take 1,000 mg by mouth 2 (two) times a day with meals, Disp: , Rfl:     metoprolol succinate (TOPROL-XL) 50 mg 24 hr tablet, Take 50 mg by mouth every evening, Disp: , Rfl:     Multiple Vitamin (multivitamin) tablet, Take 1 tablet by mouth daily, Disp: , Rfl:     pantoprazole (PROTONIX) 40 mg tablet, Take 1 tablet (40 mg total) by mouth daily, Disp: 30 tablet, Rfl: 2    simvastatin (ZOCOR) 40 mg tablet, Take 40 mg by mouth daily at bedtime, Disp: , Rfl:     losartan (COZAAR) 100 MG tablet, Take 0 5 tablets (50 mg total) by mouth daily, Disp: , Rfl:     PHYSICAL EXAM:  Vitals:    09/15/21 1444   BP: 164/100   BP Location: Left arm   Patient Position: Sitting   Cuff Size: Large   Pulse: 88   Resp: 16   Temp: (!) 97 1 °F (36 2 °C)   TempSrc: Temporal   Weight: (!) 143 kg (314 lb 3 2 oz)   Height: 6' 3" (1 905 m)     Body mass index is 39 27 kg/m²  Physical Exam  Constitutional:       General: He is not in acute distress  HENT:      Head: Normocephalic and atraumatic  Eyes:      General: No scleral icterus  Neck:      Vascular: No JVD  Cardiovascular:      Rate and Rhythm: Normal rate  Heart sounds: S1 normal and S2 normal  Murmur heard  Pulmonary:      Effort: Pulmonary effort is normal  No accessory muscle usage or respiratory distress  Breath sounds: No wheezing  Abdominal:      General: There is no distension  Palpations: Abdomen is soft  Musculoskeletal:      Cervical back: Neck supple  Right ankle: No swelling  Left ankle: No swelling  Comments: Moving all extremities   Skin:     General: Skin is warm  Comments: No jaundice    Neurological:      Mental Status: He is alert  He is not disoriented  Comments: Facial symmetry  Speech is clear   Psychiatric:         Speech: He is communicative  Behavior: Behavior is not combative  LAB RESULTS:  Results for orders placed or performed in visit on 46/68/73   Basic metabolic panel   Result Value Ref Range    Sodium 141 136 - 145 mmol/L    Potassium 4 5 3 5 - 5 3 mmol/L    Chloride 102 100 - 108 mmol/L    CO2 30 21 - 32 mmol/L    ANION GAP 9 4 - 13 mmol/L    BUN 19 5 - 25 mg/dL    Creatinine 0 96 0 60 - 1 30 mg/dL    Glucose, Fasting 164 (H) 65 - 99 mg/dL    Calcium 9 3 8 3 - 10 1 mg/dL    eGFR 83 ml/min/1 73sq m       ASSESSMENT and PLAN:  Nery Lane was seen today for follow-up and acute renal failure  Diagnoses and all orders for this visit:    FINA (acute kidney injury) ([facility] )    Stage 2 chronic kidney disease  -     CBC and differential; Future  -     Basic metabolic panel; Future  -     Urinalysis with microscopic; Future  -     Microalbumin / creatinine urine ratio; Future    Hypertensive kidney disease with stage 2 chronic kidney disease  -     Basic metabolic panel; Future  -     Urinalysis with microscopic; Future  -     Microalbumin / creatinine urine ratio; Future  -     Basic metabolic panel; Future  -     losartan (COZAAR) 100 MG tablet; Take 0 5 tablets (50 mg total) by mouth daily      1  FINA on top of CKD stage 2  Multifactorial   Suspected due to contrast induced nephropathy per     Patient was earlier admitted to [facility]  where he had underwent mesenteric lymph node biopsy which was complicated by retroperitoneal bleed  Patient's peak creatinine level was 1 7 with EGFR of 42 ( 7/8/2021 )    Upon review of old medical records, previously known baseline creatinine is around 0 9-1 1 with current creatinine of 0 96 with EGFR of 83 which is now back to baseline  FINA has resolved now  Management of hypertension as mentioned below   Recommend to avoid long-term use of NSAIDs and recheck renal function before next visit   2  Hypertension in chronic kidney disease   During recent hospital stay due to FINA, losartan and hydrochlorothiazide were stopped  Currently patient is taking metoprolol XL 50 mg PO daily and lately blood pressure is elevated and above the goal  Today's blood pressure was also on the higher side  Plan to restart patient on losartan at reduced dose - 50 mg PO daily today  Plan to check BMP in 2 weeks to reassess electrolyte and renal function   Will titrate the dose of losartan in the future as indicated   Advised patient to check blood pressure on regular basis at home  Also advised to follow low-salt diet  Returns to Nephrology office in 9 months  Plan to check CBC, BMP, urine analysis along with urine microalbumin : creatinine ratio before next visit   Portions of the record may have been created with voice recognition software  Occasional wrong word or "sound a like" substitutions may have occurred due to the inherent limitations of voice recognition software  Read the chart carefully and recognize, using context, where substitutions have occurred  If you have any questions, please contact the dictating provider

## 2021-10-18 DIAGNOSIS — R59.0 LYMPHADENOPATHY, ABDOMINAL: ICD-10-CM

## 2021-10-18 DIAGNOSIS — R93.89 ABNORMAL CT OF THE CHEST: ICD-10-CM

## 2021-10-18 RX ORDER — PANTOPRAZOLE SODIUM 40 MG/1
40 TABLET, DELAYED RELEASE ORAL DAILY
Qty: 30 TABLET | Refills: 2 | Status: SHIPPED | OUTPATIENT
Start: 2021-10-18 | End: 2021-12-17 | Stop reason: SDUPTHER

## 2021-11-05 ENCOUNTER — APPOINTMENT (OUTPATIENT)
Dept: LAB | Facility: HOSPITAL | Age: 66
End: 2021-11-05
Payer: MEDICARE

## 2021-11-05 DIAGNOSIS — N18.2 HYPERTENSIVE KIDNEY DISEASE WITH STAGE 2 CHRONIC KIDNEY DISEASE: ICD-10-CM

## 2021-11-05 DIAGNOSIS — N18.2 STAGE 2 CHRONIC KIDNEY DISEASE: ICD-10-CM

## 2021-11-05 DIAGNOSIS — I12.9 HYPERTENSIVE KIDNEY DISEASE WITH STAGE 2 CHRONIC KIDNEY DISEASE: ICD-10-CM

## 2021-11-05 LAB
ANION GAP SERPL CALCULATED.3IONS-SCNC: 10 MMOL/L (ref 4–13)
BACTERIA UR QL AUTO: ABNORMAL /HPF
BASOPHILS # BLD AUTO: 0.07 THOUSANDS/ΜL (ref 0–0.1)
BASOPHILS NFR BLD AUTO: 1 % (ref 0–1)
BILIRUB UR QL STRIP: NEGATIVE
BUN SERPL-MCNC: 18 MG/DL (ref 5–25)
CALCIUM SERPL-MCNC: 9.2 MG/DL (ref 8.3–10.1)
CHLORIDE SERPL-SCNC: 102 MMOL/L (ref 100–108)
CLARITY UR: CLEAR
CO2 SERPL-SCNC: 30 MMOL/L (ref 21–32)
COLOR UR: YELLOW
CREAT SERPL-MCNC: 1.13 MG/DL (ref 0.6–1.3)
CREAT UR-MCNC: 201 MG/DL
EOSINOPHIL # BLD AUTO: 0.4 THOUSAND/ΜL (ref 0–0.61)
EOSINOPHIL NFR BLD AUTO: 7 % (ref 0–6)
ERYTHROCYTE [DISTWIDTH] IN BLOOD BY AUTOMATED COUNT: 15.4 % (ref 11.6–15.1)
GFR SERPL CREATININE-BSD FRML MDRD: 68 ML/MIN/1.73SQ M
GLUCOSE SERPL-MCNC: 111 MG/DL (ref 65–140)
GLUCOSE UR STRIP-MCNC: NEGATIVE MG/DL
HCT VFR BLD AUTO: 45 % (ref 36.5–49.3)
HGB BLD-MCNC: 14 G/DL (ref 12–17)
HGB UR QL STRIP.AUTO: NEGATIVE
HYALINE CASTS #/AREA URNS LPF: ABNORMAL /LPF
IMM GRANULOCYTES # BLD AUTO: 0.04 THOUSAND/UL (ref 0–0.2)
IMM GRANULOCYTES NFR BLD AUTO: 1 % (ref 0–2)
KETONES UR STRIP-MCNC: NEGATIVE MG/DL
LEUKOCYTE ESTERASE UR QL STRIP: NEGATIVE
LYMPHOCYTES # BLD AUTO: 0.69 THOUSANDS/ΜL (ref 0.6–4.47)
LYMPHOCYTES NFR BLD AUTO: 12 % (ref 14–44)
MCH RBC QN AUTO: 25.9 PG (ref 26.8–34.3)
MCHC RBC AUTO-ENTMCNC: 31.1 G/DL (ref 31.4–37.4)
MCV RBC AUTO: 83 FL (ref 82–98)
MICROALBUMIN UR-MCNC: 4250 MG/L (ref 0–20)
MICROALBUMIN/CREAT 24H UR: 2114 MG/G CREATININE (ref 0–30)
MONOCYTES # BLD AUTO: 0.61 THOUSAND/ΜL (ref 0.17–1.22)
MONOCYTES NFR BLD AUTO: 10 % (ref 4–12)
NEUTROPHILS # BLD AUTO: 4.1 THOUSANDS/ΜL (ref 1.85–7.62)
NEUTS SEG NFR BLD AUTO: 69 % (ref 43–75)
NITRITE UR QL STRIP: NEGATIVE
NON-SQ EPI CELLS URNS QL MICRO: ABNORMAL /HPF
NRBC BLD AUTO-RTO: 0 /100 WBCS
PH UR STRIP.AUTO: 7 [PH]
PLATELET # BLD AUTO: 196 THOUSANDS/UL (ref 149–390)
PMV BLD AUTO: 10.6 FL (ref 8.9–12.7)
POTASSIUM SERPL-SCNC: 4.8 MMOL/L (ref 3.5–5.3)
PROT UR STRIP-MCNC: ABNORMAL MG/DL
RBC # BLD AUTO: 5.41 MILLION/UL (ref 3.88–5.62)
RBC #/AREA URNS AUTO: ABNORMAL /HPF
SODIUM SERPL-SCNC: 142 MMOL/L (ref 136–145)
SP GR UR STRIP.AUTO: 1.02 (ref 1–1.03)
UROBILINOGEN UR QL STRIP.AUTO: 0.2 E.U./DL
WBC # BLD AUTO: 5.91 THOUSAND/UL (ref 4.31–10.16)
WBC #/AREA URNS AUTO: ABNORMAL /HPF

## 2021-11-05 PROCEDURE — 81001 URINALYSIS AUTO W/SCOPE: CPT

## 2021-11-05 PROCEDURE — 82570 ASSAY OF URINE CREATININE: CPT

## 2021-11-05 PROCEDURE — 36415 COLL VENOUS BLD VENIPUNCTURE: CPT

## 2021-11-05 PROCEDURE — 82043 UR ALBUMIN QUANTITATIVE: CPT

## 2021-11-05 PROCEDURE — 80048 BASIC METABOLIC PNL TOTAL CA: CPT

## 2021-11-05 PROCEDURE — 85025 COMPLETE CBC W/AUTO DIFF WBC: CPT

## 2021-11-12 ENCOUNTER — TELEPHONE (OUTPATIENT)
Dept: NEPHROLOGY | Facility: CLINIC | Age: 66
End: 2021-11-12

## 2021-11-23 ENCOUNTER — TELEPHONE (OUTPATIENT)
Dept: NEPHROLOGY | Facility: CLINIC | Age: 66
End: 2021-11-23

## 2021-12-06 ENCOUNTER — APPOINTMENT (OUTPATIENT)
Dept: LAB | Facility: HOSPITAL | Age: 66
End: 2021-12-06
Payer: MEDICARE

## 2021-12-06 DIAGNOSIS — K92.2 ACUTE GASTROINTESTINAL HEMORRHAGE: ICD-10-CM

## 2021-12-06 DIAGNOSIS — E78.5 HYPERLIPIDEMIA, UNSPECIFIED HYPERLIPIDEMIA TYPE: ICD-10-CM

## 2021-12-06 DIAGNOSIS — IMO0002: ICD-10-CM

## 2021-12-06 LAB
ALBUMIN SERPL BCP-MCNC: 3.8 G/DL (ref 3.5–5)
ALP SERPL-CCNC: 72 U/L (ref 46–116)
ALT SERPL W P-5'-P-CCNC: 49 U/L (ref 12–78)
ANION GAP SERPL CALCULATED.3IONS-SCNC: 8 MMOL/L (ref 4–13)
AST SERPL W P-5'-P-CCNC: 32 U/L (ref 5–45)
BILIRUB SERPL-MCNC: 0.34 MG/DL (ref 0.2–1)
BUN SERPL-MCNC: 22 MG/DL (ref 5–25)
CALCIUM SERPL-MCNC: 9.7 MG/DL (ref 8.3–10.1)
CHLORIDE SERPL-SCNC: 102 MMOL/L (ref 100–108)
CO2 SERPL-SCNC: 32 MMOL/L (ref 21–32)
CREAT SERPL-MCNC: 1.07 MG/DL (ref 0.6–1.3)
GFR SERPL CREATININE-BSD FRML MDRD: 72 ML/MIN/1.73SQ M
GLUCOSE P FAST SERPL-MCNC: 130 MG/DL (ref 65–99)
POTASSIUM SERPL-SCNC: 5 MMOL/L (ref 3.5–5.3)
PROT SERPL-MCNC: 7.8 G/DL (ref 6.4–8.2)
SODIUM SERPL-SCNC: 142 MMOL/L (ref 136–145)

## 2021-12-06 PROCEDURE — 36415 COLL VENOUS BLD VENIPUNCTURE: CPT

## 2021-12-06 PROCEDURE — 80053 COMPREHEN METABOLIC PANEL: CPT

## 2021-12-17 DIAGNOSIS — R93.89 ABNORMAL CT OF THE CHEST: ICD-10-CM

## 2021-12-17 DIAGNOSIS — R59.0 LYMPHADENOPATHY, ABDOMINAL: ICD-10-CM

## 2021-12-20 RX ORDER — PANTOPRAZOLE SODIUM 40 MG/1
40 TABLET, DELAYED RELEASE ORAL DAILY
Qty: 90 TABLET | Refills: 3 | Status: SHIPPED | OUTPATIENT
Start: 2021-12-20 | End: 2022-04-13 | Stop reason: SDUPTHER

## 2022-02-04 ENCOUNTER — TELEPHONE (OUTPATIENT)
Dept: HEMATOLOGY ONCOLOGY | Facility: CLINIC | Age: 67
End: 2022-02-04

## 2022-02-04 NOTE — TELEPHONE ENCOUNTER
Left message for patient that his appt with Dr Sasha Buchanan on 5/23 has been rescheduled to Friday, 6/3 @ 10:40am and will now be with Dr Major Nolan  Advised patient to call back if this new appt does not work for him

## 2022-02-18 ENCOUNTER — APPOINTMENT (OUTPATIENT)
Dept: LAB | Facility: HOSPITAL | Age: 67
End: 2022-02-18
Payer: MEDICARE

## 2022-02-18 DIAGNOSIS — E11.37X1: Primary | ICD-10-CM

## 2022-02-18 DIAGNOSIS — Z79.4: Primary | ICD-10-CM

## 2022-02-18 DIAGNOSIS — E78.5 HYPERLIPIDEMIA, UNSPECIFIED HYPERLIPIDEMIA TYPE: ICD-10-CM

## 2022-02-18 DIAGNOSIS — I10 ESSENTIAL HYPERTENSION, MALIGNANT: ICD-10-CM

## 2022-02-18 DIAGNOSIS — M10.9 GOUT, UNSPECIFIED CAUSE, UNSPECIFIED CHRONICITY, UNSPECIFIED SITE: ICD-10-CM

## 2022-02-18 LAB
ALBUMIN SERPL BCP-MCNC: 3.8 G/DL (ref 3.5–5)
ALP SERPL-CCNC: 72 U/L (ref 46–116)
ALT SERPL W P-5'-P-CCNC: 50 U/L (ref 12–78)
ANION GAP SERPL CALCULATED.3IONS-SCNC: 7 MMOL/L (ref 4–13)
AST SERPL W P-5'-P-CCNC: 30 U/L (ref 5–45)
BASOPHILS # BLD AUTO: 0.08 THOUSANDS/ΜL (ref 0–0.1)
BASOPHILS NFR BLD AUTO: 1 % (ref 0–1)
BILIRUB SERPL-MCNC: 0.28 MG/DL (ref 0.2–1)
BUN SERPL-MCNC: 22 MG/DL (ref 5–25)
CALCIUM SERPL-MCNC: 9.4 MG/DL (ref 8.3–10.1)
CHLORIDE SERPL-SCNC: 103 MMOL/L (ref 100–108)
CHOLEST SERPL-MCNC: 162 MG/DL
CO2 SERPL-SCNC: 32 MMOL/L (ref 21–32)
CREAT SERPL-MCNC: 0.99 MG/DL (ref 0.6–1.3)
CREAT UR-MCNC: 142 MG/DL
EOSINOPHIL # BLD AUTO: 0.29 THOUSAND/ΜL (ref 0–0.61)
EOSINOPHIL NFR BLD AUTO: 4 % (ref 0–6)
ERYTHROCYTE [DISTWIDTH] IN BLOOD BY AUTOMATED COUNT: 14.3 % (ref 11.6–15.1)
EST. AVERAGE GLUCOSE BLD GHB EST-MCNC: 137 MG/DL
GFR SERPL CREATININE-BSD FRML MDRD: 79 ML/MIN/1.73SQ M
GLUCOSE P FAST SERPL-MCNC: 149 MG/DL (ref 65–99)
HBA1C MFR BLD: 6.4 %
HCT VFR BLD AUTO: 47.8 % (ref 36.5–49.3)
HDLC SERPL-MCNC: 33 MG/DL
HGB BLD-MCNC: 14.8 G/DL (ref 12–17)
IMM GRANULOCYTES # BLD AUTO: 0.05 THOUSAND/UL (ref 0–0.2)
IMM GRANULOCYTES NFR BLD AUTO: 1 % (ref 0–2)
LDLC SERPL CALC-MCNC: 83 MG/DL (ref 0–100)
LYMPHOCYTES # BLD AUTO: 0.69 THOUSANDS/ΜL (ref 0.6–4.47)
LYMPHOCYTES NFR BLD AUTO: 10 % (ref 14–44)
MCH RBC QN AUTO: 26.1 PG (ref 26.8–34.3)
MCHC RBC AUTO-ENTMCNC: 31 G/DL (ref 31.4–37.4)
MCV RBC AUTO: 84 FL (ref 82–98)
MICROALBUMIN UR-MCNC: 4900 MG/L (ref 0–20)
MICROALBUMIN/CREAT 24H UR: 3451 MG/G CREATININE (ref 0–30)
MONOCYTES # BLD AUTO: 0.69 THOUSAND/ΜL (ref 0.17–1.22)
MONOCYTES NFR BLD AUTO: 10 % (ref 4–12)
NEUTROPHILS # BLD AUTO: 4.98 THOUSANDS/ΜL (ref 1.85–7.62)
NEUTS SEG NFR BLD AUTO: 74 % (ref 43–75)
NONHDLC SERPL-MCNC: 129 MG/DL
NRBC BLD AUTO-RTO: 0 /100 WBCS
PLATELET # BLD AUTO: 161 THOUSANDS/UL (ref 149–390)
PMV BLD AUTO: 10 FL (ref 8.9–12.7)
POTASSIUM SERPL-SCNC: 5 MMOL/L (ref 3.5–5.3)
PROT SERPL-MCNC: 7.9 G/DL (ref 6.4–8.2)
RBC # BLD AUTO: 5.68 MILLION/UL (ref 3.88–5.62)
SODIUM SERPL-SCNC: 142 MMOL/L (ref 136–145)
TRIGL SERPL-MCNC: 228 MG/DL
URATE SERPL-MCNC: 7 MG/DL (ref 4.2–8)
WBC # BLD AUTO: 6.78 THOUSAND/UL (ref 4.31–10.16)

## 2022-02-18 PROCEDURE — 83036 HEMOGLOBIN GLYCOSYLATED A1C: CPT

## 2022-02-18 PROCEDURE — 80061 LIPID PANEL: CPT

## 2022-02-18 PROCEDURE — 84550 ASSAY OF BLOOD/URIC ACID: CPT

## 2022-02-18 PROCEDURE — 36415 COLL VENOUS BLD VENIPUNCTURE: CPT

## 2022-02-18 PROCEDURE — 80053 COMPREHEN METABOLIC PANEL: CPT

## 2022-02-18 PROCEDURE — 82043 UR ALBUMIN QUANTITATIVE: CPT

## 2022-02-18 PROCEDURE — 82570 ASSAY OF URINE CREATININE: CPT

## 2022-02-18 PROCEDURE — 85025 COMPLETE CBC W/AUTO DIFF WBC: CPT

## 2022-02-28 ENCOUNTER — HOSPITAL ENCOUNTER (OUTPATIENT)
Dept: ULTRASOUND IMAGING | Facility: HOSPITAL | Age: 67
Discharge: HOME | End: 2022-02-28
Payer: MEDICARE

## 2022-02-28 DIAGNOSIS — R22.1 LOCALIZED SWELLING, MASS AND LUMP, NECK: ICD-10-CM

## 2022-02-28 PROCEDURE — 76536 US EXAM OF HEAD AND NECK: CPT

## 2022-03-21 ENCOUNTER — APPOINTMENT (OUTPATIENT)
Dept: LAB | Facility: HOSPITAL | Age: 67
End: 2022-03-21
Payer: MEDICARE

## 2022-03-21 DIAGNOSIS — I10 ESSENTIAL HYPERTENSION, MALIGNANT: ICD-10-CM

## 2022-03-21 LAB
ANION GAP SERPL CALCULATED.3IONS-SCNC: 8 MMOL/L (ref 4–13)
BUN SERPL-MCNC: 28 MG/DL (ref 5–25)
CALCIUM SERPL-MCNC: 9.7 MG/DL (ref 8.3–10.1)
CHLORIDE SERPL-SCNC: 100 MMOL/L (ref 100–108)
CO2 SERPL-SCNC: 31 MMOL/L (ref 21–32)
CREAT SERPL-MCNC: 1.01 MG/DL (ref 0.6–1.3)
GFR SERPL CREATININE-BSD FRML MDRD: 77 ML/MIN/1.73SQ M
GLUCOSE SERPL-MCNC: 160 MG/DL (ref 65–140)
POTASSIUM SERPL-SCNC: 4.3 MMOL/L (ref 3.5–5.3)
SODIUM SERPL-SCNC: 139 MMOL/L (ref 136–145)

## 2022-03-21 PROCEDURE — 80048 BASIC METABOLIC PNL TOTAL CA: CPT

## 2022-03-21 PROCEDURE — 36415 COLL VENOUS BLD VENIPUNCTURE: CPT

## 2022-04-06 ENCOUNTER — TELEPHONE (OUTPATIENT)
Dept: NEPHROLOGY | Facility: CLINIC | Age: 67
End: 2022-04-06

## 2022-04-06 NOTE — TELEPHONE ENCOUNTER
I called and left a message on machine for the patient to return our call about scheduling his 9 month follow up appointment from the recall list with Dr Megan Huerta for either 6/27 or 6/28/2022    Anastacio Archuleta,

## 2022-04-07 ENCOUNTER — TELEPHONE (OUTPATIENT)
Dept: NEPHROLOGY | Facility: CLINIC | Age: 67
End: 2022-04-07

## 2022-04-07 DIAGNOSIS — N18.2 STAGE 2 CHRONIC KIDNEY DISEASE: Primary | ICD-10-CM

## 2022-04-07 NOTE — TELEPHONE ENCOUNTER
I called and spoke to the patient and schedule him for his follow up appointment with Dr Himanshu Waller from the recall list for Monday 5/9/2022  Please update patient labs in the system  The patient understood and was okay with the day and time of his next appointment   Maricarmen Drake,

## 2022-04-13 DIAGNOSIS — R93.89 ABNORMAL CT OF THE CHEST: ICD-10-CM

## 2022-04-13 DIAGNOSIS — R59.0 LYMPHADENOPATHY, ABDOMINAL: ICD-10-CM

## 2022-04-13 RX ORDER — PANTOPRAZOLE SODIUM 40 MG/1
40 TABLET, DELAYED RELEASE ORAL DAILY
Qty: 90 TABLET | Refills: 0 | Status: SHIPPED | OUTPATIENT
Start: 2022-04-13 | End: 2022-08-08

## 2022-04-14 ENCOUNTER — OFFICE VISIT (OUTPATIENT)
Dept: HEMATOLOGY ONCOLOGY | Facility: CLINIC | Age: 67
End: 2022-04-14
Payer: MEDICARE

## 2022-04-14 VITALS
DIASTOLIC BLOOD PRESSURE: 82 MMHG | RESPIRATION RATE: 18 BRPM | WEIGHT: 315 LBS | BODY MASS INDEX: 39.17 KG/M2 | HEART RATE: 69 BPM | HEIGHT: 75 IN | OXYGEN SATURATION: 99 % | TEMPERATURE: 97.9 F | SYSTOLIC BLOOD PRESSURE: 122 MMHG

## 2022-04-14 DIAGNOSIS — C48.1: Primary | ICD-10-CM

## 2022-04-14 PROCEDURE — 1124F ACP DISCUSS-NO DSCNMKR DOCD: CPT | Performed by: INTERNAL MEDICINE

## 2022-04-14 PROCEDURE — 99214 OFFICE O/P EST MOD 30 MIN: CPT | Performed by: INTERNAL MEDICINE

## 2022-04-14 RX ORDER — HYDROCHLOROTHIAZIDE 25 MG/1
25 TABLET ORAL DAILY
COMMUNITY
Start: 2022-04-04

## 2022-04-14 NOTE — PROGRESS NOTES
Hematology/Oncology Progress Note    Date of Service: 4/14/2022    [facility] [place] [place]   [address] [address]    Hem/Onc Problem List:     1  Follicular lymphoma of intra-abdominal lymph nodes, unspecified follicular lymphoma type [facility])  Chief Complaint:    Establish care for management of follicular lymphoma    Assessment/Plan:     I personally reviewed the lab results, image studies results and other specialties/physicians consult notes and recommendations  I shared the findings with patient and family, discussed the diagnosis and management plan as below  1  Follicular lymphoma, WHO grade 1-2, initially diagnosed in July 8, 2021  Patient is on observation  No B symptoms  Recent ultrasound neck soft tissue February 28, 2022 revealed 1 8 x 1 1 x 1 9 cm submandibular lymph node  Restaging CT scan is scheduled on May 12, 2022  I will check CBC, CMP and LDH  I will see patient after CT scan  2  CKD stage 2  Patient follows Nephrology  Patient will continue self hydration  3  Follow-up, return to clinic in 4-5 weeks  Disclaimer: This document was prepared using [employer] technology  If a word or phrase is confusing, or does not make sense, this is likely due to recognition error which was not discovered during the providers review  If you believe an error has occurred, please Contact me through [employer] service for mirian? cation  AJCC 8th Edition Cancer Stage :      Cancer Staging  No matching staging information was found for the patient  Hematology/Oncology History:   - incidentally diagnosed while doing imaging for aortic aneurysm, showed retroperitoneum and mesenteric lymphadenopathy, largest lymph node about 3 cm, status post biopsy showed follicular lymphoma; grade 1-2;   - 7/2021 :  Developed a big hematoma post biopsy  Hospitalized    Hemoglobin dropped to 7 8 then stable  -  8/2021 : Hb recovered  PET showed moderate tumor burden  Does not fit GELF criteria for treatment; FLIPI scaore = 2, age +  Stage 3  PET CT showed low SUV  Decided to observe  · Feb 28, 2022 US Head and neck:  IMPRESSION:     Patient's lump in the right submandibular region corresponds to an enlarged, heterogeneous submandibular lymph node measuring 1 8 x 1 1 x 1 9 cm (image 18 )     Review of the prior PET/CT from 8/10/2021 demonstrate an enlarged, hypermetabolic node in this area, therefore this is likely part of the patient's known follicular lymphoma  Difficult to assess interval change because of the difference in imaging   Modality  · May 12, 2022, CT scan chest abdomen pelvis is scheduled      History of Present Illiness:   Hue Orourke is a 73 y o  male with the above-noted HemOnc history who is here  to establish care because Dr Micah Coleman no longer works for [email or web address]  Patient has follicular lymphoma, grade 1-2 on observation  Recently, patient noticed right submandibular lymph node enlargement  Ultrasound of the neck soft tissue in February 2022 showed lymph node measuring 1 8 x 1 8 cm  He has CT scan scheduled on May 12, 2022  Patient feels fine  No fever or chills  No night sweats  No exertional chest pain, diaphoresis or shortness  of breath  No cough and phlegm, no hemoptysis  Patient denied nausea  and vomiting  No abdominal pain  No diarrhea or constipation  No  symptoms  No headache or blurred vision  No seizure activity  Appetite good  No significant weight loss or weight gain  The patient denies bleeding anywhere  ROS: A 12-point of review of systems is obtained and other than the above is noncontributory       Objective:   VITALS:   /82 (BP Location: Left arm, Patient Position: Sitting, Cuff Size: Adult)   Pulse 69   Temp 97 9 °F (36 6 °C)   Resp 18   Ht 6' 3" (1 905 m)   Wt (!) 147 kg (325 lb)   SpO2 99%   BMI 40 62 kg/m²     Physical EXAM:  General:  Alert, cooperative, no distress, appears stated age  Head:  Normocephalic, without obvious abnormality, atraumatic  Eyes:  Conjunctivae/corneas clear  PERRL, EOMs intact  No evidence of conjunctivitis     Throat: Lips, mucosa, and tongue normal   No bleeding from mouth  Neck: Supple, symmetrical, trachea midline    Lungs:   Clear to auscultation bilaterally  Respiratory effort easy, nonlabored    Heart:  Regular rate and rhythm, S1, S2 normal, no murmur  Abdomen:   Soft, non-tender,nondistended  Bowel sounds normal  No masses,  No organomegaly  Extremities:  Lymphatics: Extremities normal, atraumatic, no cyanosis or edema  Right submandibular lymph node palpable, measuring 1 8 x 1 8 cm  No tender      Skin: Skin color, texture, turgor normal  No rashes  Neurologic: A&Ox4   No focal neuro deficits       Allergies   Allergen Reactions    Lisinopril Cough       Past Medical History:   Diagnosis Date    Chronic kidney disease     Diabetes mellitus ([facility] )     High cholesterol     Hypertension        Past Surgical History:   Procedure Laterality Date    BUNIONECTOMY Right 11/24/2020    Procedure: RIGHT HAV CORRECTION,;  Surgeon: Paulo Santana DPM;  [facility];  Service: Podiatry    INCISION AND DRAINAGE OF WOUND Right 10/5/2016    Procedure: INCISION AND DRAINAGE (I&D) EXTREMITY;  Surgeon: Paulo Santana DPM;  [facility];  Service:     IR BIOPSY LYMPH NODE  7/8/2021    IR EMBOLIZATION (SPECIFY VESSEL OR SITE)  7/8/2021    PILONIDAL CYST EXCISION      MN REPAIR OF HAMMERTOE,ONE Right 2/19/2019    Procedure: THIRD HAMMER TOE CORRECTION;  Surgeon: Paulo Santana DPM;  [facility];  Service: Podiatry    TOE OSTEOTOMY Right 3/14/2017    Procedure: HAMMERTOE CORRECTION R 2 ;  Surgeon: Paulo Santana DPM;  [facility];  Service:     TOE OSTEOTOMY Left 11/24/2020    Procedure: LEFT HT CORRECTION TOE;  Surgeon: Paulo Santana DPM;  [facility]; Service: Tracey Vargas [address]       Family History   Problem Relation Age of Onset    Cancer Father         Leukemia       Social History     Socioeconomic History    Marital status: /Civil Union     Spouse name: Not on file    Number of children: Not on file    Years of education: Not on file    Highest education level: Not on file   Occupational History    Not on file   Tobacco Use    Smoking status: Never Smoker    Smokeless tobacco: Never Used    Tobacco comment: Never smoked but exposed to second hand smoke from birth until 19's   Vaping Use    Vaping Use: Not on file   Substance and Sexual Activity    Alcohol use: No    Drug use: No    Sexual activity: Not Currently     Partners: Female     Birth control/protection: Abstinence   Other Topics Concern    Not on file   Social History Narrative    Not on file     Social Determinants of Health     Financial Resource Strain: Not on file   Food Insecurity: Not on file   Transportation Needs: Not on file   Physical Activity: Not on file   Stress: Not on file   Social Connections: Not on file   Intimate Partner Violence: Not on file   Housing Stability: Not on file       Current Outpatient Medications   Medication Sig Dispense Refill    aspirin 81 mg chewable tablet Chew 81 mg daily      hydrochlorothiazide (HYDRODIURIL) 25 mg tablet Take 25 mg by mouth daily      losartan (COZAAR) 100 MG tablet Take 0 5 tablets (50 mg total) by mouth daily      metFORMIN (GLUCOPHAGE) 500 mg tablet Take 1,000 mg by mouth 2 (two) times a day with meals      Multiple Vitamins-Minerals (Centrum Silver 50+Men) TABS       pantoprazole (PROTONIX) 40 mg tablet Take 1 tablet (40 mg total) by mouth daily 90 tablet 0    simvastatin (ZOCOR) 40 mg tablet Take 40 mg by mouth daily at bedtime      allopurinol (ZYLOPRIM) 100 mg tablet Take 2 tablets (200 mg total) by mouth daily 30 tablet 0    metoprolol succinate (TOPROL-XL) 50 mg 24 hr tablet Take 50 mg by mouth every evening      Multiple Vitamin (multivitamin) tablet Take 1 tablet by mouth daily       No current facility-administered medications for this visit  (Not in a hospital admission)      DATA REVIEW:    Pathology Result:    Final Diagnosis   Date Value Ref Range Status   07/08/2021   Final    A  Lymph node, periaortic, biopsy:  -  Follicular lymphoma, WHO grade 1-2 (of 3) (see note)       06/28/2021   Final    A  Duodenum:  - Small bowel mucosa with no significant histopathologic abnormality   - Negative for malabsorption pattern  - Negative for malignancy  B  Stomach:  - Gastric mucosa with no significant histopathologic abnormality   - Negative for H  pylori by routine H&E   - Negative for malignancy  C  Esophagus, distal:  - Squamocolumnar mucosa with intestinal metaplasia  See comment   - Negative for dysplasia and malignancy  Comment: The diagnosis in this case depends on the location of this biopsy  If this biopsy was taken from the tubular esophagus at least 1 cm above the gastric folds, it represents Ozuna mucosa  If this biopsy was taken from the gastric cardia, it represents intestinal metaplasia of the gastric cardia  Image Results: They are reviewed and documented in Hematology/Oncology history    US head neck soft tissue  Narrative: HEAD AND NECK ULTRASOUND    INDICATION:   R22 1: Localized swelling, mass and lump, neck  Patient has history of follicular lymphoma  Patient describes a lump in the right submandibular region for the last 5 months  COMPARISON:  PET/CT from 8/10/2021  TECHNIQUE:   Real-time ultrasound of the right submandibular region was performed with a linear transducer with both volumetric sweeps and still imaging techniques      FINDINGS:  Patient's lump in the right submandibular region corresponds to an enlarged, heterogeneous submandibular lymph node measuring 1 8 x 1 1 x 1 9 cm (image 18 ) Internal vascularity is seen within the node on Doppler ultrasound  Multiple additional right neck mildly enlarged right neck lymph nodes are also noted  Impression: Patient's lump in the right submandibular region corresponds to an enlarged, heterogeneous submandibular lymph node measuring 1 8 x 1 1 x 1 9 cm (image 18 )    Review of the prior PET/CT from 8/10/2021 demonstrate an enlarged, hypermetabolic node in this area, therefore this is likely part of the patient's known follicular lymphoma  Difficult to assess interval change because of the difference in imaging   modality  The study was marked in EPIC for significant notification  Workstation performed: Brooklyn Owen:  Lab data are reviewed and documented in HemOn history  No results found for this or any previous visit (from the past 48 hour(s))            Perla Valle MD  4/14/2022, 8:37 AM

## 2022-04-22 ENCOUNTER — TELEPHONE (OUTPATIENT)
Dept: HEMATOLOGY ONCOLOGY | Facility: CLINIC | Age: 67
End: 2022-04-22

## 2022-04-22 NOTE — TELEPHONE ENCOUNTER
Appointment Cancellation Or Reschedule     Person calling in Patient    Provider Dr Collette Yarbrough   Office Visit Date and Time 9/19/22 at 9:20    Office Visit Location [facility]   Did patient want to reschedule their office appointment? If so, when was it scheduled to? 5/25/22 at 9 with Dr Wolf aNidu   Is this patient calling to reschedule an infusion appointment? No   When is their next infusion appointment? N/a   Is this patient a Chemo patient? No   Reason for Cancellation or Reschedule Provider     If the patient is a treatment patient, please route this to the office nurse  If the patient is not on treatment, please route to the office MA

## 2022-05-02 ENCOUNTER — TELEPHONE (OUTPATIENT)
Dept: NEPHROLOGY | Facility: CLINIC | Age: 67
End: 2022-05-02

## 2022-05-02 NOTE — TELEPHONE ENCOUNTER
Pt will have labs done prior to 05/09 appt  Subjective   Kennedy Meyer is a 32 y.o. male.   Chief Complaint   Patient presents with   • Constipation       Patient presents for evaluation of constipation.  This is a 32-year-old male with a history of chronic low back pain, depression, smoking.  He reports that for the past 3 weeks he has been very constipated.  He has dealt off and on with constipation throughout his life.  He reports that last week was the worst.  He went 3 to 4 days without a bowel movement.  He reports that when he does have a bowel movement he has to strain and his bowels are very hard to pass.  He has had no blood or mucus in his bowel movements.  He has had some lower abdominal cramping, especially last week when he went 3 to 4 days without a BM.  He has taken nothing over-the-counter for his symptoms.  No nausea, vomiting or diarrhea.  No rectal discharge.  He denies development of any other new issues today.         The following portions of the patient's history were reviewed and updated as appropriate: allergies, current medications, past family history, past medical history, past social history, past surgical history and problem list.    Review of Systems   Constitutional: Negative for activity change, chills, fatigue, fever, unexpected weight gain and unexpected weight loss.   HENT: Negative for congestion, hearing loss, postnasal drip, sinus pressure, sneezing, sore throat, swollen glands and tinnitus.    Eyes: Negative for photophobia, pain and visual disturbance.   Respiratory: Negative for cough, chest tightness, shortness of breath and wheezing.    Cardiovascular: Negative for chest pain, palpitations and leg swelling.   Gastrointestinal: Positive for constipation. Negative for abdominal distention, abdominal pain, diarrhea, nausea and vomiting.   Endocrine: Negative for polydipsia, polyphagia and polyuria.   Genitourinary: Negative for dysuria, frequency, hematuria and urgency.   Neurological: Negative for dizziness,  weakness, numbness and headache.   All other systems reviewed and are negative.      Objective    /86 (BP Location: Left arm, Patient Position: Sitting, Cuff Size: Adult)   Pulse 83   Temp 97.7 °F (36.5 °C) (Tympanic)   Wt 118 kg (261 lb)   SpO2 97%   BMI 38.54 kg/m²     Physical Exam   Constitutional: He is oriented to person, place, and time. He appears well-developed and well-nourished. No distress.   HENT:   Head: Normocephalic and atraumatic.   Right Ear: External ear normal.   Left Ear: External ear normal.   Nose: Nose normal.   Mouth/Throat: Oropharynx is clear and moist.   Eyes: EOM are normal. Pupils are equal, round, and reactive to light.   Neck: Normal range of motion. Neck supple.   Cardiovascular: Normal rate, regular rhythm, normal heart sounds and intact distal pulses. Exam reveals no gallop and no friction rub.   No murmur heard.  Pulmonary/Chest: Effort normal and breath sounds normal. No stridor. No respiratory distress. He has no wheezes. He has no rales. He exhibits no tenderness.   Lungs are CTA bilaterally   Abdominal: Soft. Normal appearance and bowel sounds are normal. He exhibits no distension. There is no tenderness. There is no tenderness at McBurney's point and negative Orona's sign.   Bowel sounds hypoactive x4 quadrants.  No pain to light or deep palpation x4 quadrants.   Musculoskeletal: Normal range of motion.   Neurological: He is alert and oriented to person, place, and time.   Skin: Skin is warm and dry. Capillary refill takes less than 2 seconds. He is not diaphoretic.   Psychiatric: He has a normal mood and affect. His behavior is normal. Judgment and thought content normal.   Nursing note and vitals reviewed.    Current outpatient and discharge medications have been reconciled for the patient.  Reviewed by: PETERSON Leigh      Assessment/Plan   Kennedy was seen today for constipation.    Diagnoses and all orders for this visit:    Constipation, unspecified  constipation type      -I provided patient with samples of Linzess, 72 mcg.  I did recommend that he begin a daily probiotic as well as a fiber supplement which he will get over-the-counter.    -Follow-up PRN if symptoms persist or worsen I will see him back in January for his previously scheduled health maintenance apt.

## 2022-05-04 ENCOUNTER — TELEPHONE (OUTPATIENT)
Dept: NEPHROLOGY | Facility: CLINIC | Age: 67
End: 2022-05-04

## 2022-05-04 NOTE — TELEPHONE ENCOUNTER
I called [id number] @ [phone] (Automated System) to check eligible for the patient and as of 5/4/2022 the patient has current active coverage as of 9/1/2021   Jessenia Rosado,

## 2022-05-12 ENCOUNTER — HOSPITAL ENCOUNTER (OUTPATIENT)
Dept: CT IMAGING | Facility: HOSPITAL | Age: 67
Discharge: HOME | End: 2022-05-12
Attending: INTERNAL MEDICINE
Payer: MEDICARE

## 2022-05-12 ENCOUNTER — APPOINTMENT (OUTPATIENT)
Dept: LAB | Facility: HOSPITAL | Age: 67
End: 2022-05-12
Payer: MEDICARE

## 2022-05-12 DIAGNOSIS — C82.90 FOLLICULAR LYMPHOMA, UNSPECIFIED FOLLICULAR LYMPHOMA TYPE, UNSPECIFIED BODY REGION (HCC): ICD-10-CM

## 2022-05-12 LAB
ALBUMIN SERPL BCP-MCNC: 3.9 G/DL (ref 3.5–5)
ALP SERPL-CCNC: 80 U/L (ref 46–116)
ALT SERPL W P-5'-P-CCNC: 52 U/L (ref 12–78)
ANION GAP SERPL CALCULATED.3IONS-SCNC: 10 MMOL/L (ref 4–13)
AST SERPL W P-5'-P-CCNC: 37 U/L (ref 5–45)
BASOPHILS # BLD AUTO: 0.06 THOUSANDS/ΜL (ref 0–0.1)
BASOPHILS NFR BLD AUTO: 1 % (ref 0–1)
BILIRUB SERPL-MCNC: 0.4 MG/DL (ref 0.2–1)
BUN SERPL-MCNC: 26 MG/DL (ref 5–25)
CALCIUM SERPL-MCNC: 9.4 MG/DL (ref 8.3–10.1)
CHLORIDE SERPL-SCNC: 100 MMOL/L (ref 100–108)
CO2 SERPL-SCNC: 29 MMOL/L (ref 21–32)
CREAT SERPL-MCNC: 1.27 MG/DL (ref 0.6–1.3)
EOSINOPHIL # BLD AUTO: 0.24 THOUSAND/ΜL (ref 0–0.61)
EOSINOPHIL NFR BLD AUTO: 4 % (ref 0–6)
ERYTHROCYTE [DISTWIDTH] IN BLOOD BY AUTOMATED COUNT: 14.6 % (ref 11.6–15.1)
GFR SERPL CREATININE-BSD FRML MDRD: 58 ML/MIN/1.73SQ M
GLUCOSE P FAST SERPL-MCNC: 142 MG/DL (ref 65–99)
HCT VFR BLD AUTO: 39.8 % (ref 36.5–49.3)
HGB BLD-MCNC: 12.6 G/DL (ref 12–17)
IMM GRANULOCYTES # BLD AUTO: 0.06 THOUSAND/UL (ref 0–0.2)
IMM GRANULOCYTES NFR BLD AUTO: 1 % (ref 0–2)
LDH SERPL-CCNC: 182 U/L (ref 81–234)
LYMPHOCYTES # BLD AUTO: 0.83 THOUSANDS/ΜL (ref 0.6–4.47)
LYMPHOCYTES NFR BLD AUTO: 13 % (ref 14–44)
MCH RBC QN AUTO: 27.2 PG (ref 26.8–34.3)
MCHC RBC AUTO-ENTMCNC: 31.7 G/DL (ref 31.4–37.4)
MCV RBC AUTO: 86 FL (ref 82–98)
MONOCYTES # BLD AUTO: 0.64 THOUSAND/ΜL (ref 0.17–1.22)
MONOCYTES NFR BLD AUTO: 10 % (ref 4–12)
NEUTROPHILS # BLD AUTO: 4.69 THOUSANDS/ΜL (ref 1.85–7.62)
NEUTS SEG NFR BLD AUTO: 71 % (ref 43–75)
NRBC BLD AUTO-RTO: 0 /100 WBCS
PLATELET # BLD AUTO: 191 THOUSANDS/UL (ref 149–390)
PMV BLD AUTO: 9.5 FL (ref 8.9–12.7)
POTASSIUM SERPL-SCNC: 4.6 MMOL/L (ref 3.5–5.3)
PROT SERPL-MCNC: 7.8 G/DL (ref 6.4–8.2)
RBC # BLD AUTO: 4.64 MILLION/UL (ref 3.88–5.62)
SODIUM SERPL-SCNC: 139 MMOL/L (ref 136–145)
WBC # BLD AUTO: 6.52 THOUSAND/UL (ref 4.31–10.16)

## 2022-05-12 PROCEDURE — 85025 COMPLETE CBC W/AUTO DIFF WBC: CPT | Performed by: INTERNAL MEDICINE

## 2022-05-12 PROCEDURE — 74176 CT ABD & PELVIS W/O CONTRAST: CPT

## 2022-05-12 PROCEDURE — 80053 COMPREHEN METABOLIC PANEL: CPT | Performed by: INTERNAL MEDICINE

## 2022-05-12 PROCEDURE — 71250 CT THORAX DX C-: CPT

## 2022-05-12 PROCEDURE — 36415 COLL VENOUS BLD VENIPUNCTURE: CPT | Performed by: INTERNAL MEDICINE

## 2022-05-12 PROCEDURE — 83615 LACTATE (LD) (LDH) ENZYME: CPT | Performed by: INTERNAL MEDICINE

## 2022-05-15 NOTE — PROGRESS NOTES
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    Hem/Onc Problem List:     1  Follicular lymphoma of intra-abdominal lymph nodes, unspecified follicular lymphoma type [facility])  Chief Complaint:    Establish care for management of follicular lymphoma    Assessment/Plan:     I personally reviewed the lab results, image studies results and other specialties/physicians consult notes and recommendations  I shared the findings with patient and family, discussed the diagnosis and management plan as below  Follicular lymphoma, WHO grade 1-2, initially diagnosed in July 8, 2021  Patient is on observation  No B symptoms  Ultrasound neck soft tissue February 28, 2022 revealed 1 8 x 1 1 x 1 9 cm submandibular lymph node  Restaging CT scan May 12, 2022 showed slowly increasing diffuse LAD  For example, Conglomerate kennedy mass at the root of the small bowel mesentery on image 82 of series 2 measures approximately 12 0 x 7 5 cm, increased from 8 9 x 6 3 cm when measured using similar technique on July 10, 2021  I will check CBC, CMP and LDH  I will see patient after CT scan  CKD stage 2  Patient follows Nephrology  Patient will continue self hydration with >2 liters a day  Follow-up, return to clinic in 6 months with restaging CT CAP and labs      Disclaimer: This document was prepared using [employer] Flu[employer] Direct technology  If a word or phrase is confusing, or does not make sense, this is likely due to recognition error which was not discovered during the providers review  If you believe an error has occurred, please Contact me through [employer] service for mirian? cation      AJCC 8th Edition Cancer Stage :      Cancer Staging  Stage 3    Hematology/Oncology History:   - incidentally diagnosed while doing imaging for aortic aneurysm, showed retroperitoneum and mesenteric lymphadenopathy, largest lymph node about 3 cm, status post biopsy showed follicular lymphoma; grade 1-2;   - 7/2021 :  Developed a big hematoma post biopsy  Hospitalized  Hemoglobin dropped to 7 8 then stable  -  8/2021 : Hb recovered  PET showed moderate tumor burden  Does not fit GELF criteria for treatment; FLIPI score = 2, age +  Stage 3  PET CT showed low SUV  Decided to observe  - Feb 28, 2022 US Head and neck:Patient's lump in the right submandibular region corresponds to an enlarged, heterogeneous submandibular lymph node measuring 1 8 x 1 1 x 1 9 cm (image 18 ) Review of the prior PET/CT from 8/10/2021 demonstrate an enlarged, hypermetabolic node in this area, therefore this is likely part of the patient's known follicular lymphoma  Difficult to assess interval change because of the difference in imaging modality  - May 12, 2022, CT CAP w/o c with interval increase in mesenteric lymph nodes, new pulmonary nodules  Conglomerate kennedy mass at the root of the small bowel mesentery on image 82 of series 2 measures approximately 12 0 x 7 5 cm, increased from 8 9 x 6 3 cm when measured using similar technique on July 10, 2021  Discrete upper retroperitoneal nodes are slightly increased from July 10, 2021, specifically an aortocaval node measuring 2 9 x 2 3 cm on image 74 of series 2, increased from 2 2 x 2 0 cm on prior exam and an anterior left periaortic node measuring 2 4 x 2 0 cm on image 76 of series 2 increased from 2 1 x 1 7 cm on prior examination     History of Present Illiness:   Jose Villeda is a 73 y o  male with the above-noted HemOnc history who is here  to establish care because Dr Marilia Gutiérrez no longer works for [employer]  Patient has follicular lymphoma, grade 1-2 on observation  Recently, patient noticed right submandibular lymph node enlargement    Ultrasound of the neck soft tissue in February 2022 showed lymph node measuring 1 8 x 1 8 cm  He has CT scan scheduled on May 12, 2022  Patient feels fine  No fever or chills  No night sweats  No exertional chest pain, diaphoresis or shortness  of breath  No cough and phlegm, no hemoptysis  Patient denied nausea  and vomiting  No abdominal pain  No diarrhea or constipation  No  symptoms  No headache or blurred vision  No seizure activity  Appetite good  No significant weight loss or weight gain  The patient denies bleeding anywhere  ROS: A 10-point of review of systems is obtained and other than the above is noncontributory  Objective:   VITALS:   /84 (BP Location: Left arm, Patient Position: Sitting, Cuff Size: Adult)   Pulse 69   Temp (!) 97 2 °F (36 2 °C)   Resp 16   Ht 6' 3" (1 905 m)   Wt (!) 146 kg (322 lb)   SpO2 96%   BMI 40 25 kg/m²     Physical EXAM:  General:  Alert, cooperative, no distress, appears stated age  Head:  Normocephalic, without obvious abnormality, atraumatic  Eyes:  Conjunctivae/corneas clear  EOMs intact  No evidence of conjunctivitis     Throat: Lips, mucosa, and tongue normal   No bleeding from mouth  Neck: Supple, symmetrical, trachea midline    Lungs:   Clear to auscultation bilaterally  Respiratory effort easy, nonlabored    Heart:  Regular rate and rhythm, + S1, S2   Abdomen:   Soft, non-tender,nondistended  Bowel sounds normal  No masses,  No organomegaly     Extremities:  Lymphatics: Extremities normal, atraumatic, no cyanosis or edema  Right submandibular lymph node palpable  No tender      Skin: Skin color, texture, turgor normal  No rashes  Neurologic: AAO   No focal neuro deficits noted b/l       Allergies   Allergen Reactions    Lisinopril Cough       Past Medical History:   Diagnosis Date    Chronic kidney disease     Diabetes mellitus ([facility] [address] )     High cholesterol     Hypertension        Past Surgical History:   Procedure Laterality Date    BUNIONECTOMY Right 11/24/2020    Procedure: RIGHT HAV CORRECTION,;  Surgeon: Rhiannon Hammond DPM;  [facility];  Service: Podiatry    INCISION AND DRAINAGE OF WOUND Right 10/5/2016    Procedure: INCISION AND DRAINAGE (I&D) EXTREMITY;  Surgeon: Rhiannon Hammond DPM;  [facility];  Service:     IR BIOPSY LYMPH NODE  7/8/2021    IR EMBOLIZATION (SPECIFY VESSEL OR SITE)  7/8/2021    PILONIDAL CYST EXCISION      ND REPAIR OF HAMMERTOE,ONE Right 2/19/2019    Procedure: THIRD HAMMER TOE CORRECTION;  Surgeon: Rhiannon Hammond DPM;  [facility];  Service: Podiatry    TOE OSTEOTOMY Right 3/14/2017    Procedure: HAMMERTOE CORRECTION R 2 ;  Surgeon: Rhiannon Hammond DPM;  [facility];  Service:     TOE OSTEOTOMY Left 11/24/2020    Procedure: LEFT HT CORRECTION TOE;  Surgeon: Rhiannon Hammond DPM;  [facility];  Service: [address]       Family History   Problem Relation Age of Onset    Cancer Father         Leukemia       Social History     Socioeconomic History    Marital status: /Civil Union     Spouse name: Not on file    Number of children: Not on file    Years of education: Not on file    Highest education level: Not on file   Occupational History    Not on file   Tobacco Use    Smoking status: Never Smoker    Smokeless tobacco: Never Used    Tobacco comment: Never smoked but exposed to second hand smoke from birth until 19's   Vaping Use    Vaping Use: Not on file   Substance and Sexual Activity    Alcohol use: No    Drug use: No    Sexual activity: Not Currently     Partners: Female     Birth control/protection: Abstinence   Other Topics Concern    Not on file   Social History Narrative    Not on file     Social Determinants of Health     Financial Resource Strain: Not on file   Food Insecurity: Not on file   Transportation Needs: Not on file   Physical Activity: Not on file   Stress: Not on file   Social Connections: Not on file   Intimate Partner Violence: Not on file   Housing Stability: Not on file       Current Outpatient Medications   Medication Sig Dispense Refill    aspirin 81 mg chewable tablet Chew 81 mg daily      hydrochlorothiazide (HYDRODIURIL) 25 mg tablet Take 25 mg by mouth daily      losartan (COZAAR) 100 MG tablet Take 0 5 tablets (50 mg total) by mouth daily      metFORMIN (GLUCOPHAGE) 500 mg tablet Take 1,000 mg by mouth 2 (two) times a day with meals      Multiple Vitamins-Minerals (Centrum Silver 50+Men) TABS       pantoprazole (PROTONIX) 40 mg tablet Take 1 tablet (40 mg total) by mouth daily 90 tablet 0    simvastatin (ZOCOR) 40 mg tablet Take 40 mg by mouth daily at bedtime      allopurinol (ZYLOPRIM) 100 mg tablet Take 2 tablets (200 mg total) by mouth daily 30 tablet 0    metoprolol succinate (TOPROL-XL) 50 mg 24 hr tablet Take 50 mg by mouth every evening      Multiple Vitamin (multivitamin) tablet Take 1 tablet by mouth daily       No current facility-administered medications for this visit  (Not in a hospital admission)      DATA REVIEW:    Pathology Result:    Final Diagnosis   Date Value Ref Range Status   07/08/2021   Final    A  Lymph node, periaortic, biopsy:  -  Follicular lymphoma, WHO grade 1-2 (of 3) (see note)       06/28/2021   Final    A  Duodenum:  - Small bowel mucosa with no significant histopathologic abnormality   - Negative for malabsorption pattern  - Negative for malignancy  B  Stomach:  - Gastric mucosa with no significant histopathologic abnormality   - Negative for H  pylori by routine H&E   - Negative for malignancy  C  Esophagus, distal:  - Squamocolumnar mucosa with intestinal metaplasia  See comment   - Negative for dysplasia and malignancy  Comment: The diagnosis in this case depends on the location of this biopsy  If this biopsy was taken from the tubular esophagus at least 1 cm above the gastric folds, it represents Ozuna mucosa    If this biopsy was taken from the gastric cardia, it represents intestinal metaplasia of the gastric cardia  Image Results: They are reviewed and documented in Hematology/Oncology history    CT chest abdomen pelvis wo contrast  Narrative: CT CHEST, ABDOMEN AND PELVIS WITHOUT IV CONTRAST    INDICATION:   F63 29: Follicular lymphoma, unspecified, unspecified site  COMPARISON:  PET CT performed August 10, 2021  TECHNIQUE: CT examination of the chest, abdomen and pelvis was performed without intravenous contrast  This examination was performed without intravenous contrast in the context of the critical nationwide Omnipaque shortage  Axial, sagittal, and coronal   2D reformatted images were created from the source data and submitted for interpretation  Radiation dose length product (DLP) for this visit:  1843 mGy-cm   This examination, like all CT scans performed in the [facility], was performed utilizing techniques to minimize radiation dose exposure, including the use of iterative   reconstruction and automated exposure control  Enteric contrast was administered  FINDINGS:    CHEST    LUNGS: Unchanged linear scarring in the posterior medial right upper lobe  There is a new juxtapleural nodule in the medial left lung base on image 95 of series 3, measuring 9 mm  Also noted is a new lateral right middle lobe pulmonary nodule measuring 4 mm  These are indeterminate  No other new or suspicious pulmonary mass, groundglass opacity, or consolidative density  PLEURA:  Unremarkable  HEART/GREAT VESSELS: Heavy atherosclerotic coronary artery calcification is noted  Aortic valvular calcification also reidentified  There is fusiform ectasia of the ascending thoracic aorta measuring up to 44 mm  This is unchanged from CT angiogram   performed May 28, 2021  MEDIASTINUM AND GABRIEL:  Unremarkable  CHEST WALL AND LOWER NECK:  Unremarkable      ABDOMEN    LIVER/BILIARY TREE:  Liver is diffusely decreased in density consistent with fatty change  No CT evidence of suspicious hepatic mass  Normal hepatic contours  No biliary dilatation  GALLBLADDER:  There are gallstone(s) within the gallbladder, without pericholecystic inflammatory changes  SPLEEN:  Spleen is not enlarged and no splenic masses are identified  A splenule is unchanged from PET/CT scan of August 10, 2021 at which time it did not demonstrate hypermetabolism  PANCREAS:  Unremarkable  ADRENAL GLANDS:  Unremarkable  KIDNEYS/URETERS:  Lobulated renal contours suggesting some element of renal scarring reidentified  Nonobstructing 2 mm calculus at the lower pole the left kidney  No ureteral calculi  No hydronephrosis  STOMACH AND BOWEL:  Unremarkable  APPENDIX:  A normal appendix was visualized  ABDOMINOPELVIC CAVITY:  Extensive lymphadenopathy at the root of the small bowel mesentery reidentified  Conglomerate kennedy mass at the root of the small bowel mesentery on image 82 of series 2 measures approximately 12 0 x 7 5 cm, increased from 8 9 x   6 3 cm when measured using similar technique on July 10, 2021  Discrete upper retroperitoneal nodes are slightly increased from July 10, 2021, specifically an aortocaval node measuring 2 9 x 2 3 cm on image 74 of series 2, increased from 2 2 x 2 0 cm on   prior exam and an anterior left periaortic node measuring 2 4 x 2 0 cm on image 76 of series 2 increased from 2 1 x 1 7 cm on prior examination  Previously noted left-sided retroperitoneal hematoma has resolved  VESSELS:  Unremarkable for patient's age  PELVIS    REPRODUCTIVE ORGANS:  Unremarkable for patient's age  URINARY BLADDER:  Unremarkable  ABDOMINAL WALL/INGUINAL REGIONS:  Fat containing bilaterally or hernias  OSSEOUS STRUCTURES:  No acute fracture or destructive osseous lesion  Spinal degenerative changes    Impression: Slight progression of mesenteric and upper retroperitoneal lymphadenopathy  Indeterminate small pulmonary nodules which are new when compared to previous examination and warrant continued attention follow-up imaging  Fusiform ectasia of ascending thoracic aorta measuring up to 44 mm  Hepatic steatosis  Cholelithiasis  Workstation performed: [id number]        LABS:  Lab data are reviewed and documented in HemOnc history  No results found for this or any previous visit (from the past 48 hour(s))            Inocencia Hansen MD  5/25/2022, 9:29 AM

## 2022-05-25 ENCOUNTER — OFFICE VISIT (OUTPATIENT)
Dept: HEMATOLOGY ONCOLOGY | Facility: CLINIC | Age: 67
End: 2022-05-25
Payer: MEDICARE

## 2022-05-25 VITALS
RESPIRATION RATE: 16 BRPM | HEART RATE: 69 BPM | HEIGHT: 75 IN | TEMPERATURE: 97.2 F | WEIGHT: 315 LBS | SYSTOLIC BLOOD PRESSURE: 138 MMHG | BODY MASS INDEX: 39.17 KG/M2 | DIASTOLIC BLOOD PRESSURE: 84 MMHG | OXYGEN SATURATION: 96 %

## 2022-05-25 DIAGNOSIS — C48.1: Primary | ICD-10-CM

## 2022-05-25 PROCEDURE — 99214 OFFICE O/P EST MOD 30 MIN: CPT | Performed by: INTERNAL MEDICINE

## 2022-07-11 ENCOUNTER — APPOINTMENT (OUTPATIENT)
Dept: LAB | Facility: HOSPITAL | Age: 67
End: 2022-07-11
Payer: MEDICARE

## 2022-07-11 ENCOUNTER — TELEPHONE (OUTPATIENT)
Dept: NEPHROLOGY | Facility: CLINIC | Age: 67
End: 2022-07-11

## 2022-07-11 ENCOUNTER — HOSPITAL ENCOUNTER (OUTPATIENT)
Dept: RADIOLOGY | Facility: HOSPITAL | Age: 67
Discharge: HOME | End: 2022-07-11
Payer: MEDICARE

## 2022-07-11 DIAGNOSIS — M54.31 RIGHT SIDED SCIATICA: ICD-10-CM

## 2022-07-11 DIAGNOSIS — N18.2 STAGE 2 CHRONIC KIDNEY DISEASE: ICD-10-CM

## 2022-07-11 LAB
ANION GAP SERPL CALCULATED.3IONS-SCNC: 11 MMOL/L (ref 4–13)
BACTERIA UR QL AUTO: ABNORMAL /HPF
BASOPHILS # BLD AUTO: 0.14 THOUSANDS/ΜL (ref 0–0.1)
BASOPHILS NFR BLD AUTO: 1 % (ref 0–1)
BILIRUB UR QL STRIP: NEGATIVE
BUN SERPL-MCNC: 32 MG/DL (ref 5–25)
CALCIUM SERPL-MCNC: 9.9 MG/DL (ref 8.3–10.1)
CHLORIDE SERPL-SCNC: 98 MMOL/L (ref 100–108)
CLARITY UR: CLEAR
CO2 SERPL-SCNC: 28 MMOL/L (ref 21–32)
COLOR UR: YELLOW
CREAT SERPL-MCNC: 1.38 MG/DL (ref 0.6–1.3)
CREAT UR-MCNC: 101 MG/DL
EOSINOPHIL # BLD AUTO: 0.09 THOUSAND/ΜL (ref 0–0.61)
EOSINOPHIL NFR BLD AUTO: 1 % (ref 0–6)
ERYTHROCYTE [DISTWIDTH] IN BLOOD BY AUTOMATED COUNT: 13.7 % (ref 11.6–15.1)
GFR SERPL CREATININE-BSD FRML MDRD: 53 ML/MIN/1.73SQ M
GLUCOSE SERPL-MCNC: 214 MG/DL (ref 65–140)
GLUCOSE UR STRIP-MCNC: NEGATIVE MG/DL
HCT VFR BLD AUTO: 41.6 % (ref 36.5–49.3)
HGB BLD-MCNC: 12.8 G/DL (ref 12–17)
HGB UR QL STRIP.AUTO: NEGATIVE
IMM GRANULOCYTES # BLD AUTO: 0.29 THOUSAND/UL (ref 0–0.2)
IMM GRANULOCYTES NFR BLD AUTO: 2 % (ref 0–2)
KETONES UR STRIP-MCNC: NEGATIVE MG/DL
LEUKOCYTE ESTERASE UR QL STRIP: NEGATIVE
LYMPHOCYTES # BLD AUTO: 0.79 THOUSANDS/ΜL (ref 0.6–4.47)
LYMPHOCYTES NFR BLD AUTO: 6 % (ref 14–44)
MCH RBC QN AUTO: 26.5 PG (ref 26.8–34.3)
MCHC RBC AUTO-ENTMCNC: 30.8 G/DL (ref 31.4–37.4)
MCV RBC AUTO: 86 FL (ref 82–98)
MICROALBUMIN UR-MCNC: 1740 MG/L (ref 0–20)
MICROALBUMIN/CREAT 24H UR: 1723 MG/G CREATININE (ref 0–30)
MONOCYTES # BLD AUTO: 0.85 THOUSAND/ΜL (ref 0.17–1.22)
MONOCYTES NFR BLD AUTO: 6 % (ref 4–12)
MUCOUS THREADS UR QL AUTO: ABNORMAL
NEUTROPHILS # BLD AUTO: 11.58 THOUSANDS/ΜL (ref 1.85–7.62)
NEUTS SEG NFR BLD AUTO: 84 % (ref 43–75)
NITRITE UR QL STRIP: NEGATIVE
NON-SQ EPI CELLS URNS QL MICRO: ABNORMAL /HPF
NRBC BLD AUTO-RTO: 0 /100 WBCS
PH UR STRIP.AUTO: 8 [PH]
PHOSPHATE SERPL-MCNC: 4 MG/DL (ref 2.3–4.1)
PLATELET # BLD AUTO: 327 THOUSANDS/UL (ref 149–390)
PMV BLD AUTO: 10.3 FL (ref 8.9–12.7)
POTASSIUM SERPL-SCNC: 5 MMOL/L (ref 3.5–5.3)
PROT UR STRIP-MCNC: >=300 MG/DL
PTH-INTACT SERPL-MCNC: 72.8 PG/ML (ref 18.4–80.1)
RBC # BLD AUTO: 4.83 MILLION/UL (ref 3.88–5.62)
RBC #/AREA URNS AUTO: ABNORMAL /HPF
SODIUM SERPL-SCNC: 137 MMOL/L (ref 136–145)
SP GR UR STRIP.AUTO: 1.02 (ref 1–1.03)
URATE SERPL-MCNC: 7 MG/DL (ref 4.2–8)
UROBILINOGEN UR QL STRIP.AUTO: 1 E.U./DL
WBC # BLD AUTO: 13.74 THOUSAND/UL (ref 4.31–10.16)
WBC #/AREA URNS AUTO: ABNORMAL /HPF

## 2022-07-11 PROCEDURE — 82043 UR ALBUMIN QUANTITATIVE: CPT

## 2022-07-11 PROCEDURE — 83970 ASSAY OF PARATHORMONE: CPT

## 2022-07-11 PROCEDURE — 85025 COMPLETE CBC W/AUTO DIFF WBC: CPT

## 2022-07-11 PROCEDURE — 73502 X-RAY EXAM HIP UNI 2-3 VIEWS: CPT

## 2022-07-11 PROCEDURE — 80048 BASIC METABOLIC PNL TOTAL CA: CPT

## 2022-07-11 PROCEDURE — 36415 COLL VENOUS BLD VENIPUNCTURE: CPT

## 2022-07-11 PROCEDURE — 84550 ASSAY OF BLOOD/URIC ACID: CPT

## 2022-07-11 PROCEDURE — 84100 ASSAY OF PHOSPHORUS: CPT

## 2022-07-11 PROCEDURE — 81001 URINALYSIS AUTO W/SCOPE: CPT

## 2022-07-11 PROCEDURE — 82570 ASSAY OF URINE CREATININE: CPT

## 2022-07-11 NOTE — TELEPHONE ENCOUNTER
I called and spoke to the patient about moving up his nephrology follow up with Dr Soha Levy on 7/202/2022 and the patient understood and was okay with the time change of his appointment   Uyen Murphy,

## 2022-07-19 ENCOUNTER — TELEPHONE (OUTPATIENT)
Dept: NEPHROLOGY | Facility: CLINIC | Age: 67
End: 2022-07-19

## 2022-07-20 ENCOUNTER — OFFICE VISIT (OUTPATIENT)
Dept: NEPHROLOGY | Facility: CLINIC | Age: 67
End: 2022-07-20
Payer: MEDICARE

## 2022-07-20 VITALS
SYSTOLIC BLOOD PRESSURE: 150 MMHG | OXYGEN SATURATION: 96 % | WEIGHT: 313.6 LBS | HEIGHT: 76 IN | DIASTOLIC BLOOD PRESSURE: 86 MMHG | BODY MASS INDEX: 38.19 KG/M2 | RESPIRATION RATE: 16 BRPM | TEMPERATURE: 97.3 F | HEART RATE: 79 BPM

## 2022-07-20 DIAGNOSIS — R80.8 OTHER PROTEINURIA: ICD-10-CM

## 2022-07-20 DIAGNOSIS — I12.9 HYPERTENSIVE KIDNEY DISEASE WITH STAGE 3 CHRONIC KIDNEY DISEASE, UNSPECIFIED WHETHER STAGE 3A OR 3B CKD (HCC): ICD-10-CM

## 2022-07-20 DIAGNOSIS — N18.31 STAGE 3A CHRONIC KIDNEY DISEASE (HCC): Primary | ICD-10-CM

## 2022-07-20 DIAGNOSIS — N18.30 HYPERTENSIVE KIDNEY DISEASE WITH STAGE 3 CHRONIC KIDNEY DISEASE, UNSPECIFIED WHETHER STAGE 3A OR 3B CKD (HCC): ICD-10-CM

## 2022-07-20 PROCEDURE — 99214 OFFICE O/P EST MOD 30 MIN: CPT | Performed by: INTERNAL MEDICINE

## 2022-07-20 RX ORDER — PREDNISONE 20 MG/1
TABLET ORAL DAILY
COMMUNITY

## 2022-07-20 NOTE — PROGRESS NOTES
NEPHROLOGY OFFICE FOLLOW UP  Markos Benson 73 y  o male  MRN: [id number]    Encounter: [id number] DATE: 7/20/2022    REASON FOR VISIT: Markos Benson is a 73 y o  male who is here on 7/20/2022 for further management of chronic kidney disease  HPI:    This is 80-year-old male with past medical history significant for hypertension, diabetes type 2, hyperlipidemia, returns to the Nephrology office for further management of CKD stage 3  Upon review of old medical records, new baseline creatinine seems to be fluctuating around 1 2-1 4  In the past patient has developed retroperitoneal bleed after mesenteric lymph node biopsy on top of contrast induced nephropathy leading to FINA  Patient was earlier found to have mesenteric and retroperitoneal lymphadenopathy  Patient had underwent lymph node biopsy in the past   Patient was seen by oncologist on 4/14/2022 and was recommended to have repeat CT scan which was done on 5/12/2022 which showed progressive mesenteric and upper retroperitoneal lymphadenopathy  Patient was evaluated by Oncology in office on 5/25/2022  Patient was diagnosed with follicular lymphoma and I have reviewed oncologist recent office note was recommending continue surveillance with repeat CT scan in the future  According to patient there is no plan for chemotherapy or radiation at this point  Previously patient had developed FINA and losartan was discontinued during the hospital stay  Patient was found to have significant proteinuria and I have restarted losartan during last office visit  Patient has underlying hypertension  According to patient, he has been checking blood pressure frequently which is fluctuating at home  Patient has diabetes type 2 and currently taking metformin and according to patient diabetes is currently under well control  In the interim patient has developed right leg pain and suspected to have sciatica pain and was started on prednisone by PCP    MRI of lumbar spine was order by PCP which has not been done so far  Patient takes all the medications as prescribed and denies use of NSAIDs   REVIEW OF SYSTEMS:    Review of Systems   Constitutional: Negative for chills and fever  HENT: Negative for nosebleeds and sore throat  Eyes: Negative for photophobia and pain  Respiratory: Negative for choking and wheezing  Cardiovascular: Negative for chest pain and palpitations  Gastrointestinal: Negative for blood in stool  Genitourinary: Negative for hematuria  Musculoskeletal: Negative for neck stiffness  Skin: Negative for pallor  Neurological: Negative for seizures and syncope  Psychiatric/Behavioral: Negative for confusion and suicidal ideas           PAST MEDICAL HISTORY:  Past Medical History:   Diagnosis Date    Chronic kidney disease     Diabetes mellitus ([facility] [address] )     High cholesterol     Hypertension        PAST SURGICAL HISTORY:  Past Surgical History:   Procedure Laterality Date    BUNIONECTOMY Right 11/24/2020    Procedure: RIGHT HAV CORRECTION,;  Surgeon: Ronna Boyer DPM;  [facility];  Service: Podiatry    INCISION AND DRAINAGE OF WOUND Right 10/5/2016    Procedure: INCISION AND DRAINAGE (I&D) EXTREMITY;  Surgeon: Ronna Boyer DPM;  [facility];  Service:     IR BIOPSY LYMPH NODE  7/8/2021    IR EMBOLIZATION (SPECIFY VESSEL OR SITE)  7/8/2021    PILONIDAL CYST EXCISION      NM REPAIR OF HAMMERTOE,ONE Right 2/19/2019    Procedure: THIRD HAMMER TOE CORRECTION;  Surgeon: Ronna Boyer DPM;  [facility];  Service: Podiatry    TOE OSTEOTOMY Right 3/14/2017    Procedure: HAMMERTOE CORRECTION R 2 ;  Surgeon: Ronna Boyer DPM;  [facility];  Service:     TOE OSTEOTOMY Left 11/24/2020    Procedure: LEFT HT CORRECTION TOE;  Surgeon: Ronna Boyer DPM;  [facility];  Service: [address] [place] [address] HISTORY:  Social History     Substance and Sexual Activity   Alcohol Use No     Social History     Substance and Sexual Activity   Drug Use No     Social History     Tobacco Use   Smoking Status Never Smoker   Smokeless Tobacco Never Used   Tobacco Comment    Never smoked but exposed to second hand smoke from birth until 19's       FAMILY HISTORY:  Family History   Problem Relation Age of Onset    Cancer Father         Leukemia       ALLERGY:  Allergies   Allergen Reactions    Lisinopril Cough       MEDICATIONS:    Current Outpatient Medications:     aspirin 81 mg chewable tablet, Chew 81 mg daily, Disp: , Rfl:     hydrochlorothiazide (HYDRODIURIL) 25 mg tablet, Take 25 mg by mouth daily, Disp: , Rfl:     losartan (COZAAR) 100 MG tablet, Take 0 5 tablets (50 mg total) by mouth daily (Patient taking differently: Take 100 mg by mouth daily), Disp: , Rfl:     metFORMIN (GLUCOPHAGE) 500 mg tablet, Take 1,000 mg by mouth 2 (two) times a day with meals, Disp: , Rfl:     metoprolol succinate (TOPROL-XL) 50 mg 24 hr tablet, Take 50 mg by mouth every evening, Disp: , Rfl:     Multiple Vitamins-Minerals (Centrum Silver 50+Men) TABS, , Disp: , Rfl:     pantoprazole (PROTONIX) 40 mg tablet, Take 1 tablet (40 mg total) by mouth daily, Disp: 90 tablet, Rfl: 0    predniSONE 20 mg tablet, Take by mouth daily, Disp: , Rfl:     simvastatin (ZOCOR) 40 mg tablet, Take 40 mg by mouth daily at bedtime, Disp: , Rfl:     allopurinol (ZYLOPRIM) 100 mg tablet, Take 2 tablets (200 mg total) by mouth daily, Disp: 30 tablet, Rfl: 0    Multiple Vitamin (multivitamin) tablet, Take 1 tablet by mouth daily, Disp: , Rfl:     PHYSICAL EXAM:  Vitals:    07/20/22 1122   BP: 150/86   BP Location: Left arm   Patient Position: Sitting   Cuff Size: Large   Pulse: 79   Resp: 16   Temp: (!) 97 3 °F (36 3 °C)   TempSrc: Temporal   SpO2: 96%   Weight: (!) 142 kg (313 lb 9 6 oz)   Height: 6' 3 6" (1 92 m)     Body mass index is 38 58 kg/m²      Physical Exam  Constitutional:       General: He is not in acute distress  Appearance: He is obese  HENT:      Right Ear: External ear normal    Eyes:      General: No scleral icterus  Conjunctiva/sclera:      Right eye: No hemorrhage  Neck:      Thyroid: No thyromegaly  Vascular: No JVD  Cardiovascular:      Rate and Rhythm: Normal rate  Heart sounds: Murmur heard  Pulmonary:      Effort: Pulmonary effort is normal  No accessory muscle usage or respiratory distress  Breath sounds: No wheezing  Abdominal:      General: There is no distension  Musculoskeletal:      Right ankle: No swelling  Left ankle: No swelling  Skin:     General: Skin is warm  Coloration: Skin is not jaundiced  Neurological:      Mental Status: He is alert  He is not disoriented  Psychiatric:         Speech: He is communicative  Behavior: Behavior is not combative           LAB RESULTS:  Results for orders placed or performed in visit on 45/42/76   Basic metabolic panel   Result Value Ref Range    Sodium 137 136 - 145 mmol/L    Potassium 5 0 3 5 - 5 3 mmol/L    Chloride 98 (L) 100 - 108 mmol/L    CO2 28 21 - 32 mmol/L    ANION GAP 11 4 - 13 mmol/L    BUN 32 (H) 5 - 25 mg/dL    Creatinine 1 38 (H) 0 60 - 1 30 mg/dL    Glucose 214 (H) 65 - 140 mg/dL    Calcium 9 9 8 3 - 10 1 mg/dL    eGFR 53 ml/min/1 73sq m   CBC and differential   Result Value Ref Range    WBC 13 74 (H) 4 31 - 10 16 Thousand/uL    RBC 4 83 3 88 - 5 62 Million/uL    Hemoglobin 12 8 12 0 - 17 0 g/dL    Hematocrit 41 6 36 5 - 49 3 %    MCV 86 82 - 98 fL    MCH 26 5 (L) 26 8 - 34 3 pg    MCHC 30 8 (L) 31 4 - 37 4 g/dL    RDW 13 7 11 6 - 15 1 %    MPV 10 3 8 9 - 12 7 fL    Platelets 917 610 - 043 Thousands/uL    nRBC 0 /100 WBCs    Neutrophils Relative 84 (H) 43 - 75 %    Immat GRANS % 2 0 - 2 %    Lymphocytes Relative 6 (L) 14 - 44 %    Monocytes Relative 6 4 - 12 %    Eosinophils Relative 1 0 - 6 %    Basophils Relative 1 0 - 1 %    Neutrophils Absolute 11 58 (H) 1 85 - 7 62 Thousands/µL Immature Grans Absolute 0 29 (H) 0 00 - 0 20 Thousand/uL    Lymphocytes Absolute 0 79 0 60 - 4 47 Thousands/µL    Monocytes Absolute 0 85 0 17 - 1 22 Thousand/µL    Eosinophils Absolute 0 09 0 00 - 0 61 Thousand/µL    Basophils Absolute 0 14 (H) 0 00 - 0 10 Thousands/µL   Phosphorus   Result Value Ref Range    Phosphorus 4 0 2 3 - 4 1 mg/dL   PTH, intact   Result Value Ref Range    PTH 72 8 18 4 - 80 1 pg/mL   Uric acid   Result Value Ref Range    Uric Acid 7 0 4 2 - 8 0 mg/dL       ASSESSMENT and PLAN:  Latha Harrison was seen today for chronic kidney disease and follow-up  Diagnoses and all orders for this visit:    Stage 3a chronic kidney disease ([facility] [address] )  -     CBC and differential; Future  -     Basic metabolic panel; Future  -     Urinalysis with microscopic; Future  -     Microalbumin / creatinine urine ratio; Future  -     Phosphorus; Future  -     Uric acid; Future  -     PTH, intact; Future  -     Vitamin D 25 hydroxy; Future  -     Protein electrophoresis, serum; Future  -     Protein electrophoresis, urine; Future    Hypertensive kidney disease with stage 3 chronic kidney disease, unspecified whether stage 3a or 3b CKD ([facility])  -     Basic metabolic panel; Future  -     Urinalysis with microscopic; Future  -     Microalbumin / creatinine urine ratio; Future    Other proteinuria  -     Basic metabolic panel; Future  -     Urinalysis with microscopic; Future  -     Microalbumin / creatinine urine ratio; Future  -     Protein electrophoresis, serum; Future  -     Protein electrophoresis, urine; Future      1  CKD stage 3  Multifactorial, suspected due to underlying diabetic nephropathy on top of hypertensive nephrosclerosis with proteinuria  Upon review of old medical records from [facility] chart review, new baseline creatinine seems to be fluctuating around 1 2-1 4 with current creatinine of 1 38 with EGFR of 53      In the past patient had underwent mesenteric lymph node biopsy which was complicated by retroperitoneal bleed on top of developed contrast induced nephropathy and had developed FINA  Patient is now been followed by oncologist at [place] [id number] and I have personally reviewed oncologist note from 5/25/2022  Patient has follicular lymphoma and had underwent CT scan on 5/12/2022 which showed progression of mesenteric and upper retroperitoneal lymphadenopathy but plan is to continue surveillance and patient is scheduled to undergo repeat CT scan in near future  Management of diabetes and hypertension as mentioned below  Clinically patient is not in volume overload state and advised patient to drink around 2 L of fluid on daily basis to ensure staying well hydrated  Plan to avoid NSAIDs and recheck renal function before next visit  2   Hypertension in chronic kidney disease   Today's blood pressure was slightly on the higher side  According to patient his blood pressure has been fluctuating at home and patient is been attributed due to right leg pain which was suspected due to sciatica and currently taking oral prednisone which was prescribed by PCP  For time being monitor hypertension with losartan 100 mg PO daily, hydrochlorothiazide 25 mg PO daily and metoprolol XL 50 mg PO daily  Advised patient to check blood pressure frequently at home and make a log for our review with the next visit  Also encouraged patient to follow low-salt diet  3  Diabetes type 2 in chronic kidney disease  Goal Hb A1c would be < 7% for underlying chronic kidney disease  Currently patient is taking metformin  Recommend discontinuation of metformin if GFR goes below 30  Defer further management diabetes to PCP  4  Proteinuria  Multifactorial and suspected due to underlying diabetic nephropathy on top of hypertensive nephrosclerosis  Patient was previously taking losartan but developed FINA and losartan was discontinued    I have restarted patient on losartan 50 mg PO daily during last visit and in the interim dose was increased to 100 mg PO daily  Patient's current urine microalbumin:  Creatinine ratio is 1 7 g   Discussed in length with patient that current amount of proteinuria is a high risk factor for further worsening of underlying chronic kidney disease  Aim to control diabetes and hypertension to indirectly help control proteinuria  Plan to check SPEP and UPEP for further evaluation  Returns to Nephrology office in 6 months   Future labs +SPEP and UPEP ordered  Portions of the record may have been created with voice recognition software  Occasional wrong word or "sound a like" substitutions may have occurred due to the inherent limitations of voice recognition software  Read the chart carefully and recognize, using context, where substitutions have occurred  If you have any questions, please contact the dictating provider

## 2022-08-05 PROCEDURE — 99285 EMERGENCY DEPT VISIT HI MDM: CPT

## 2022-08-06 ENCOUNTER — HOSPITAL ENCOUNTER (EMERGENCY)
Facility: HOSPITAL | Age: 67
Discharge: HOME | End: 2022-08-06
Attending: EMERGENCY MEDICINE
Payer: MEDICARE

## 2022-08-06 ENCOUNTER — APPOINTMENT (EMERGENCY)
Dept: CT IMAGING | Facility: HOSPITAL | Age: 67
End: 2022-08-06
Payer: MEDICARE

## 2022-08-06 VITALS
WEIGHT: 315 LBS | DIASTOLIC BLOOD PRESSURE: 73 MMHG | HEIGHT: 75 IN | RESPIRATION RATE: 20 BRPM | OXYGEN SATURATION: 95 % | SYSTOLIC BLOOD PRESSURE: 127 MMHG | BODY MASS INDEX: 39.17 KG/M2 | TEMPERATURE: 99.9 F | HEART RATE: 82 BPM

## 2022-08-06 DIAGNOSIS — C85.90 LYMPHOMA (HCC): ICD-10-CM

## 2022-08-06 DIAGNOSIS — R10.13 EPIGASTRIC ABDOMINAL PAIN: Primary | ICD-10-CM

## 2022-08-06 DIAGNOSIS — K29.60 REFLUX GASTRITIS: ICD-10-CM

## 2022-08-06 DIAGNOSIS — R07.9 CHEST PAIN: ICD-10-CM

## 2022-08-06 DIAGNOSIS — D73.89 LESION OF SPLEEN: ICD-10-CM

## 2022-08-06 LAB
2HR DELTA HS TROPONIN: 0 NG/L
ALBUMIN SERPL BCP-MCNC: 3.2 G/DL (ref 3.5–5)
ALP SERPL-CCNC: 96 U/L (ref 46–116)
ALT SERPL W P-5'-P-CCNC: 109 U/L (ref 12–78)
ANION GAP SERPL CALCULATED.3IONS-SCNC: 9 MMOL/L (ref 4–13)
AST SERPL W P-5'-P-CCNC: 50 U/L (ref 5–45)
ATRIAL RATE: 90 BPM
BASOPHILS # BLD AUTO: 0.04 THOUSANDS/ΜL (ref 0–0.1)
BASOPHILS NFR BLD AUTO: 0 % (ref 0–1)
BILIRUB SERPL-MCNC: 0.3 MG/DL (ref 0.2–1)
BUN SERPL-MCNC: 19 MG/DL (ref 5–25)
CALCIUM ALBUM COR SERPL-MCNC: 10.6 MG/DL (ref 8.3–10.1)
CALCIUM SERPL-MCNC: 10 MG/DL (ref 8.3–10.1)
CARDIAC TROPONIN I PNL SERPL HS: 7 NG/L
CARDIAC TROPONIN I PNL SERPL HS: 7 NG/L
CHLORIDE SERPL-SCNC: 96 MMOL/L (ref 96–108)
CO2 SERPL-SCNC: 31 MMOL/L (ref 21–32)
CREAT SERPL-MCNC: 1.28 MG/DL (ref 0.6–1.3)
EOSINOPHIL # BLD AUTO: 0.28 THOUSAND/ΜL (ref 0–0.61)
EOSINOPHIL NFR BLD AUTO: 3 % (ref 0–6)
ERYTHROCYTE [DISTWIDTH] IN BLOOD BY AUTOMATED COUNT: 14.3 % (ref 11.6–15.1)
GFR SERPL CREATININE-BSD FRML MDRD: 58 ML/MIN/1.73SQ M
GLUCOSE SERPL-MCNC: 192 MG/DL (ref 65–140)
HCT VFR BLD AUTO: 37 % (ref 36.5–49.3)
HGB BLD-MCNC: 11.3 G/DL (ref 12–17)
IMM GRANULOCYTES # BLD AUTO: 0.09 THOUSAND/UL (ref 0–0.2)
IMM GRANULOCYTES NFR BLD AUTO: 1 % (ref 0–2)
LIPASE SERPL-CCNC: 93 U/L (ref 73–393)
LYMPHOCYTES # BLD AUTO: 0.75 THOUSANDS/ΜL (ref 0.6–4.47)
LYMPHOCYTES NFR BLD AUTO: 7 % (ref 14–44)
MCH RBC QN AUTO: 25.8 PG (ref 26.8–34.3)
MCHC RBC AUTO-ENTMCNC: 30.5 G/DL (ref 31.4–37.4)
MCV RBC AUTO: 85 FL (ref 82–98)
MONOCYTES # BLD AUTO: 0.94 THOUSAND/ΜL (ref 0.17–1.22)
MONOCYTES NFR BLD AUTO: 9 % (ref 4–12)
NEUTROPHILS # BLD AUTO: 8.56 THOUSANDS/ΜL (ref 1.85–7.62)
NEUTS SEG NFR BLD AUTO: 80 % (ref 43–75)
NRBC BLD AUTO-RTO: 0 /100 WBCS
P AXIS: 48 DEGREES
PLATELET # BLD AUTO: 295 THOUSANDS/UL (ref 149–390)
PMV BLD AUTO: 10.2 FL (ref 8.9–12.7)
POTASSIUM SERPL-SCNC: 4.5 MMOL/L (ref 3.5–5.3)
PR INTERVAL: 194 MS
PROT SERPL-MCNC: 8.5 G/DL (ref 6.4–8.4)
QRS AXIS: 18 DEGREES
QRSD INTERVAL: 92 MS
QT INTERVAL: 364 MS
QTC INTERVAL: 445 MS
RBC # BLD AUTO: 4.38 MILLION/UL (ref 3.88–5.62)
SODIUM SERPL-SCNC: 136 MMOL/L (ref 135–147)
T WAVE AXIS: 72 DEGREES
VENTRICULAR RATE: 90 BPM
WBC # BLD AUTO: 10.66 THOUSAND/UL (ref 4.31–10.16)

## 2022-08-06 PROCEDURE — 80053 COMPREHEN METABOLIC PANEL: CPT | Performed by: EMERGENCY MEDICINE

## 2022-08-06 PROCEDURE — 85025 COMPLETE CBC W/AUTO DIFF WBC: CPT | Performed by: EMERGENCY MEDICINE

## 2022-08-06 PROCEDURE — 93010 ELECTROCARDIOGRAM REPORT: CPT | Performed by: INTERNAL MEDICINE

## 2022-08-06 PROCEDURE — 84484 ASSAY OF TROPONIN QUANT: CPT | Performed by: EMERGENCY MEDICINE

## 2022-08-06 PROCEDURE — 74176 CT ABD & PELVIS W/O CONTRAST: CPT

## 2022-08-06 PROCEDURE — 83690 ASSAY OF LIPASE: CPT | Performed by: EMERGENCY MEDICINE

## 2022-08-06 PROCEDURE — 36415 COLL VENOUS BLD VENIPUNCTURE: CPT | Performed by: EMERGENCY MEDICINE

## 2022-08-06 PROCEDURE — 93005 ELECTROCARDIOGRAM TRACING: CPT

## 2022-08-06 PROCEDURE — 99285 EMERGENCY DEPT VISIT HI MDM: CPT | Performed by: EMERGENCY MEDICINE

## 2022-08-06 PROCEDURE — G1004 CDSM NDSC: HCPCS

## 2022-08-06 RX ORDER — LIDOCAINE HYDROCHLORIDE 20 MG/ML
15 SOLUTION OROPHARYNGEAL ONCE
Status: COMPLETED | OUTPATIENT
Start: 2022-08-06 | End: 2022-08-06

## 2022-08-06 RX ORDER — SUCRALFATE 1 G/1
1 TABLET ORAL 4 TIMES DAILY
Qty: 28 TABLET | Refills: 0 | Status: SHIPPED | OUTPATIENT
Start: 2022-08-06 | End: 2022-08-06 | Stop reason: SDUPTHER

## 2022-08-06 RX ORDER — MAGNESIUM HYDROXIDE/ALUMINUM HYDROXICE/SIMETHICONE 120; 1200; 1200 MG/30ML; MG/30ML; MG/30ML
30 SUSPENSION ORAL ONCE
Status: COMPLETED | OUTPATIENT
Start: 2022-08-06 | End: 2022-08-06

## 2022-08-06 RX ORDER — PANTOPRAZOLE SODIUM 40 MG/1
40 TABLET, DELAYED RELEASE ORAL DAILY
Qty: 60 TABLET | Refills: 0 | Status: SHIPPED | OUTPATIENT
Start: 2022-08-06 | End: 2022-08-08

## 2022-08-06 RX ORDER — SUCRALFATE 1 G/1
1 TABLET ORAL 4 TIMES DAILY PRN
Qty: 28 TABLET | Refills: 0 | Status: SHIPPED | OUTPATIENT
Start: 2022-08-06 | End: 2022-08-15 | Stop reason: HOSPADM

## 2022-08-06 RX ADMIN — ALUMINUM HYDROXIDE, MAGNESIUM HYDROXIDE, AND SIMETHICONE 30 ML: 200; 200; 20 SUSPENSION ORAL at 00:42

## 2022-08-06 RX ADMIN — LIDOCAINE HYDROCHLORIDE 15 ML: 20 SOLUTION ORAL; TOPICAL at 00:42

## 2022-08-06 NOTE — DISCHARGE INSTRUCTIONS
Please follow up PCP and Gastroenterology and Hematology-Oncology  Recommend tylenol 650 mg and ibuprofen 600 mg every 6 hours as needed for pain  Please take the Protonix daily  Please use the Maalox and Carafate as needed as prescribed  Please return for severe chest pain, significant shortness of breath, severely worsening symptoms,  or any other concerning signs or symptoms  Please refer to the following documents for additional instructions and return precautions

## 2022-08-06 NOTE — ED PROVIDER NOTES
History  Chief Complaint   Patient presents with    Chest Pain     Pt c/o intermittent chest pain started 3 weeks ago, pain became worse today     41-year-old male history of diabetes, CKD, hypertension on aspirin presenting with a couple weeks of epigastric discomfort  Reports 2 weeks of relatively constant epigastric pain he describes as a burning sensation  Reports feels similar to previous reflux  Denies any worsening or exacerbating symptoms  Tried some Pepto-Bismol without significant improvement  Of note, patient recently stopped Protonix without taper  Has soon follow-up with Gastroenterology  Denies any nausea vomiting  Denies any shortness on component  Also denies any associated diaphoresis, near-syncope, vomiting  Denies any other complaints  Prior to Admission Medications   Prescriptions Last Dose Informant Patient Reported? Taking?    Multiple Vitamin (multivitamin) tablet  Self Yes No   Sig: Take 1 tablet by mouth daily   Multiple Vitamins-Minerals (Centrum Silver 50+Men) TABS  Self Yes No   allopurinol (ZYLOPRIM) 100 mg tablet  Self No No   Sig: Take 2 tablets (200 mg total) by mouth daily   aspirin 81 mg chewable tablet  Self Yes No   Sig: Chew 81 mg daily   hydrochlorothiazide (HYDRODIURIL) 25 mg tablet  Self Yes No   Sig: Take 25 mg by mouth daily   losartan (COZAAR) 100 MG tablet  Self No No   Sig: Take 0 5 tablets (50 mg total) by mouth daily   Patient taking differently: Take 100 mg by mouth daily   metFORMIN (GLUCOPHAGE) 500 mg tablet  Self Yes No   Sig: Take 1,000 mg by mouth 2 (two) times a day with meals   metoprolol succinate (TOPROL-XL) 50 mg 24 hr tablet  Self Yes No   Sig: Take 50 mg by mouth every evening   pantoprazole (PROTONIX) 40 mg tablet  Self No No   Sig: Take 1 tablet (40 mg total) by mouth daily   predniSONE 20 mg tablet  Self Yes No   Sig: Take by mouth daily   simvastatin (ZOCOR) 40 mg tablet  Self Yes No   Sig: Take 40 mg by mouth daily at bedtime Facility-Administered Medications: None       Past Medical History:   Diagnosis Date    Chronic kidney disease     Diabetes mellitus ([facility] [address] )     High cholesterol     Hypertension        Past Surgical History:   Procedure Laterality Date    BUNIONECTOMY Right 11/24/2020    Procedure: RIGHT HAV CORRECTION,;  Surgeon: Eduardo Ruelas DPM;  [facility];  Service: Podiatry    INCISION AND DRAINAGE OF WOUND Right 10/5/2016    Procedure: INCISION AND DRAINAGE (I&D) EXTREMITY;  Surgeon: Eduardo Ruelas DPM;  [facility];  Service:     IR BIOPSY LYMPH NODE  7/8/2021    IR EMBOLIZATION (SPECIFY VESSEL OR SITE)  7/8/2021    PILONIDAL CYST EXCISION      PA REPAIR OF HAMMERTOE,ONE Right 2/19/2019    Procedure: THIRD HAMMER TOE CORRECTION;  Surgeon: Eduardo Ruelas DPM;  [facility];  Service: Podiatry    TOE OSTEOTOMY Right 3/14/2017    Procedure: HAMMERTOE CORRECTION R 2 ;  Surgeon: Eduardo Ruelas DPM;  [facility];  Service:     TOE OSTEOTOMY Left 11/24/2020    Procedure: LEFT HT CORRECTION TOE;  Surgeon: Eduardo Ruelas DPM;  [facility];  Service: Podiatry   [address]        Family History   Problem Relation Age of Onset    Cancer Father         Leukemia     I have reviewed and agree with the history as documented  E-Cigarette/Vaping    E-Cigarette Use Never User      E-Cigarette/Vaping Substances    Nicotine No     THC No     CBD No     Flavoring No     Other No     Unknown No      Social History     Tobacco Use    Smoking status: Never Smoker    Smokeless tobacco: Never Used    Tobacco comment: Never smoked but exposed to second hand smoke from birth until 19's   Vaping Use    Vaping Use: Never used   Substance Use Topics    Alcohol use: No    Drug use: No       Review of Systems   Constitutional: Negative for appetite change, chills, diaphoresis, fever and unexpected weight change  HENT: Negative for congestion and rhinorrhea      Eyes: Negative for photophobia and visual disturbance  Respiratory: Negative for cough, chest tightness and shortness of breath  Cardiovascular: Negative for chest pain, palpitations and leg swelling  Gastrointestinal: Positive for abdominal pain  Negative for abdominal distention, blood in stool, constipation, diarrhea, nausea and vomiting  Genitourinary: Negative for dysuria and hematuria  Musculoskeletal: Negative for back pain, joint swelling, neck pain and neck stiffness  Skin: Negative for color change, pallor, rash and wound  Neurological: Negative for dizziness, syncope, weakness, light-headedness and headaches  Psychiatric/Behavioral: Negative for agitation  All other systems reviewed and are negative  Physical Exam  Physical Exam  Vitals and nursing note reviewed  Constitutional:       General: He is not in acute distress  Appearance: Normal appearance  He is well-developed  He is not ill-appearing, toxic-appearing or diaphoretic  HENT:      Head: Normocephalic and atraumatic  Nose: Nose normal  No congestion or rhinorrhea  Mouth/Throat:      Mouth: Mucous membranes are moist       Pharynx: Oropharynx is clear  No oropharyngeal exudate or posterior oropharyngeal erythema  Eyes:      General: No scleral icterus  Right eye: No discharge  Left eye: No discharge  Extraocular Movements: Extraocular movements intact  Conjunctiva/sclera: Conjunctivae normal       Pupils: Pupils are equal, round, and reactive to light  Neck:      Vascular: No JVD  Trachea: No tracheal deviation  Comments: Supple  Normal range of motion  Cardiovascular:      Rate and Rhythm: Normal rate and regular rhythm  Heart sounds: Normal heart sounds  No murmur heard  No friction rub  No gallop  Comments: Normal rate and regular rhythm  Pulmonary:      Effort: Pulmonary effort is normal  No respiratory distress  Breath sounds: Normal breath sounds  No stridor  No wheezing or rales  Comments: Clear to auscultation bilaterally  Chest:      Chest wall: No tenderness  Abdominal:      General: Bowel sounds are normal  There is no distension  Palpations: Abdomen is soft  Tenderness: There is abdominal tenderness  There is no right CVA tenderness, left CVA tenderness, guarding or rebound  Comments: Mild epigastric tenderness palpation  Otherwise soft and nondistended  Normal bowel sounds throughout   Musculoskeletal:         General: No swelling, tenderness, deformity or signs of injury  Normal range of motion  Cervical back: Normal range of motion and neck supple  No rigidity  No muscular tenderness  Right lower leg: No edema  Left lower leg: No edema  Lymphadenopathy:      Cervical: No cervical adenopathy  Skin:     General: Skin is warm and dry  Coloration: Skin is not pale  Findings: No erythema or rash  Neurological:      General: No focal deficit present  Mental Status: He is alert  Mental status is at baseline  Sensory: No sensory deficit  Motor: No weakness or abnormal muscle tone  Coordination: Coordination normal       Gait: Gait normal       Comments: Alert  Strength and sensation grossly intact  Ambulatory without difficulty at baseline  Psychiatric:         Behavior: Behavior normal          Thought Content:  Thought content normal          Vital Signs  ED Triage Vitals   Temperature Pulse Respirations Blood Pressure SpO2   08/05/22 2357 08/05/22 2357 08/05/22 2357 08/05/22 2357 08/05/22 2357   99 9 °F (37 7 °C) 95 18 156/78 96 %      Temp Source Heart Rate Source Patient Position - Orthostatic VS BP Location FiO2 (%)   08/05/22 2357 08/05/22 2357 08/05/22 2357 08/05/22 2357 --   Tympanic Monitor Sitting Left arm       Pain Score       08/06/22 0115       8           Vitals:    08/05/22 2357 08/06/22 0121   BP: 156/78 127/73   Pulse: 95 82   Patient Position - Orthostatic VS: Sitting Visual Acuity      ED Medications  Medications   Lidocaine Viscous HCl (XYLOCAINE) 2 % mucosal solution 15 mL (15 mL Swish & Spit Given 8/6/22 0042)   aluminum-magnesium hydroxide-simethicone (MYLANTA) oral suspension 30 mL (30 mL Oral Given 8/6/22 0042)       Diagnostic Studies  Results Reviewed     Procedure Component Value Units Date/Time    HS Troponin I 2hr [[id number]]  (Normal) Collected: 08/06/22 0228    Lab Status: Final result Specimen: Blood from Arm, Right Updated: 08/06/22 0302     hs TnI 2hr 7 ng/L      Delta 2hr hsTnI 0 ng/L     HS Troponin I 4hr [[id number]]     Lab Status: No result Specimen: Blood     HS Troponin 0hr (reflex protocol) [[id number]]  (Normal) Collected: 08/06/22 0038    Lab Status: Final result Specimen: Blood from Arm, Right Updated: 08/06/22 0108     hs TnI 0hr 7 ng/L     Lipase [[id number]]  (Normal) Collected: 08/06/22 0038    Lab Status: Final result Specimen: Blood from Arm, Right Updated: 08/06/22 0102     Lipase 93 u/L     Comprehensive metabolic panel [[id number]]  (Abnormal) Collected: 08/06/22 0038    Lab Status: Final result Specimen: Blood from Arm, Right Updated: 08/06/22 0102     Sodium 136 mmol/L      Potassium 4 5 mmol/L      Chloride 96 mmol/L      CO2 31 mmol/L      ANION GAP 9 mmol/L      BUN 19 mg/dL      Creatinine 1 28 mg/dL      Glucose 192 mg/dL      Calcium 10 0 mg/dL      Corrected Calcium 10 6 mg/dL      AST 50 U/L       U/L      Alkaline Phosphatase 96 U/L      Total Protein 8 5 g/dL      Albumin 3 2 g/dL      Total Bilirubin 0 30 mg/dL      eGFR 58 ml/min/1 73sq m     Narrative:      Blayne guidelines for Chronic Kidney Disease (CKD):     Stage 1 with normal or high GFR (GFR > 90 mL/min/1 73 square meters)    Stage 2 Mild CKD (GFR = 60-89 mL/min/1 73 square meters)    Stage 3A Moderate CKD (GFR = 45-59 mL/min/1 73 square meters)    Stage 3B Moderate CKD (GFR = 30-44 mL/min/1 73 square meters)    Stage 4 Severe CKD (GFR = 15-29 mL/min/1 73 square meters)    Stage 5 End Stage CKD (GFR <15 mL/min/1 73 square meters)  Note: GFR calculation is accurate only with a steady state creatinine    CBC and differential [[id number]]  (Abnormal) Collected: 08/06/22 0038    Lab Status: Final result Specimen: Blood from Arm, Right Updated: 08/06/22 0046     WBC 10 66 Thousand/uL      RBC 4 38 Million/uL      Hemoglobin 11 3 g/dL      Hematocrit 37 0 %      MCV 85 fL      MCH 25 8 pg      MCHC 30 5 g/dL      RDW 14 3 %      MPV 10 2 fL      Platelets 157 Thousands/uL      nRBC 0 /100 WBCs      Neutrophils Relative 80 %      Immat GRANS % 1 %      Lymphocytes Relative 7 %      Monocytes Relative 9 %      Eosinophils Relative 3 %      Basophils Relative 0 %      Neutrophils Absolute 8 56 Thousands/µL      Immature Grans Absolute 0 09 Thousand/uL      Lymphocytes Absolute 0 75 Thousands/µL      Monocytes Absolute 0 94 Thousand/µL      Eosinophils Absolute 0 28 Thousand/µL      Basophils Absolute 0 04 Thousands/µL                  CT abdomen pelvis wo contrast   Final Result by Gustabo Goltz, MD (08/06 0214)      8 6 cm low-attenuation focus within the spleen which is not present on CT examination of 5/12/2022  Differential considerations include splenic abscess or progression of patient's follicular lymphoma  This finding can be further evaluated with    contrast-enhanced MR abdomen  Progression of upper abdominal lymphadenopathy  Mesenteric and retroperitoneal lymph nodes kennedy burden appears similar to 5/12/2022  New 2 6 cm cystic focus immediately deep to the umbilicus likely to represent additional disease spread               Workstation performed: [id number]                    Procedures  Procedures         ED Course             HEART Risk Score    Flowsheet Row Most Recent Value   Heart Score Risk Calculator    History 0 Filed at: 08/06/2022 0157   ECG 1 Filed at: 08/06/2022 0157   Age 2 Filed at: 08/06/2022 0157   Risk Factors 2 Filed at: 08/06/2022 0157   Troponin 0 Filed at: 08/06/2022 0157   HEART Score 5 Filed at: 08/06/2022 0157                        SBIRT 22yo+    Flowsheet Row Most Recent Value   SBIRT (25 yo +)    In order to provide better care to our patients, we are screening all of our patients for alcohol and drug use  Would it be okay to ask you these screening questions? Yes Filed at: 08/06/2022 0011   Initial Alcohol Screen: US AUDIT-C     1  How often do you have a drink containing alcohol? 0 Filed at: 08/06/2022 0011   2  How many drinks containing alcohol do you have on a typical day you are drinking? 0 Filed at: 08/06/2022 0011   3b  FEMALE Any Age, or MALE 65+: How often do you have 4 or more drinks on one occassion? 0 Filed at: 08/06/2022 0011   Audit-C Score 0 Filed at: 08/06/2022 0011   MARVIN: How many times in the past year have you    Used an illegal drug or used a prescription medication for non-medical reasons? Never Filed at: 08/06/2022 0011                    MDM  Number of Diagnoses or Management Options  Chest pain  Epigastric abdominal pain  Lesion of spleen  Lymphoma ([facility] [address] )  Reflux gastritis  Diagnosis management comments: 50-year-old male history of diabetes, CKD, hypertension on aspirin presenting with a couple weeks of epigastric discomfort  Epigastric tenderness in setting of burning epigastric pain for the past 2 weeks of feels like his normal reflux with recent stoppage of Protonix  Likely reflux/gastritis  Plan for cardiac evaluation plus abdominal labs  CT abdomen pelvis  Will trial oral symptom management with GI cocktail to start  Reassess  EKG normal sinus rhythm, no acute ST changes  Labs notable for initial troponin of 7  Symptoms almost completely resolved immediately after medications  Likely GI source  Imaging notable for new and enlarging lesions consistent with patient's worsening lymphoma  Patient notified  Advised follow-up Hem Onc    Also advised follow-up Gastroenterology and prescriptions sent to pharmacy  Discussed results and recommendations  Advised follow up PCP  Medication recommendations  Given instructions and return precautions  Patient/family at bedside acknowledged understanding of all written and verbal instructions and return precautions  Discharged  Amount and/or Complexity of Data Reviewed  Clinical lab tests: reviewed and ordered  Tests in the radiology section of CPT®: reviewed and ordered  Tests in the medicine section of CPT®: reviewed and ordered  Decide to obtain previous medical records or to obtain history from someone other than the patient: yes  Obtain history from someone other than the patient: yes  Review and summarize past medical records: yes  Independent visualization of images, tracings, or specimens: yes    Risk of Complications, Morbidity, and/or Mortality  Presenting problems: high  Diagnostic procedures: high  Management options: high    Patient Progress  Patient progress: improved      Disposition  Final diagnoses:   Epigastric abdominal pain   Chest pain   Reflux gastritis   Lymphoma ([address] )   Lesion of spleen     Time reflects when diagnosis was documented in both MDM as applicable and the Disposition within this note     Time User Action Codes Description Comment    8/6/2022  1:53 AM Jessica Boozer Add [R10 13] Epigastric abdominal pain     8/6/2022  1:54 AM Ejssica Boozer Add [R07 9] Chest pain     8/6/2022  1:54 AM Jessica Boozer Add [K29 60] Reflux gastritis     8/6/2022  2:23 AM Jessica Boozer Add [C85 90] Lymphoma ([address] )     8/6/2022  2:23 AM Jessica Boozer Add [D73 89] Lesion of spleen       ED Disposition     ED Disposition   Discharge    Condition   Stable    Date/Time   Sat Aug 6, 2022  1:54 AM    Comment   Otis Justice discharge to home/self care                 Follow-up Information     Follow up With Specialties Details Why Contact Info Additional Information    Aletha Rao MD Family Medicine Schedule an appointment as soon as possible for a visit in 1 week  [address] [facility] [address] [facility]       [facility] Gastroenterology Schedule an appointment as soon as possible for a visit in 3 days  [address] [facility], [address], [address] [facility], [place], [place], [address] [facility]           Patient's Medications   Discharge Prescriptions    PANTOPRAZOLE (PROTONIX) 40 MG TABLET    Take 1 tablet (40 mg total) by mouth daily       Start Date: 8/6/2022  End Date: --       Order Dose: 40 mg       Quantity: 60 tablet    Refills: 0    SUCRALFATE (CARAFATE) 1 G TABLET    Take 1 tablet (1 g total) by mouth 4 (four) times a day as needed (Reflux)       Start Date: 8/6/2022  End Date: --       Order Dose: 1 g       Quantity: 28 tablet    Refills: 0           PDMP Review       Value Time User    PDMP Reviewed  Yes 7/19/2021  5:45 AM Susie Pagie PA-C          ED Provider  Electronically Signed by           Rome Cuba MD  08/06/22 2584

## 2022-08-08 ENCOUNTER — OFFICE VISIT (OUTPATIENT)
Dept: GASTROENTEROLOGY | Facility: MEDICAL CENTER | Age: 67
End: 2022-08-08
Payer: MEDICARE

## 2022-08-08 VITALS
HEART RATE: 85 BPM | TEMPERATURE: 98.3 F | SYSTOLIC BLOOD PRESSURE: 142 MMHG | DIASTOLIC BLOOD PRESSURE: 78 MMHG | OXYGEN SATURATION: 97 % | WEIGHT: 308.4 LBS | HEIGHT: 75 IN | BODY MASS INDEX: 38.35 KG/M2

## 2022-08-08 DIAGNOSIS — Z11.59 ENCOUNTER FOR SCREENING FOR OTHER VIRAL DISEASES: ICD-10-CM

## 2022-08-08 DIAGNOSIS — R63.4 WEIGHT LOSS: ICD-10-CM

## 2022-08-08 DIAGNOSIS — R19.7 DIARRHEA, UNSPECIFIED TYPE: ICD-10-CM

## 2022-08-08 DIAGNOSIS — E66.01 OBESITY, MORBID (HCC): ICD-10-CM

## 2022-08-08 DIAGNOSIS — K76.0 FATTY (CHANGE OF) LIVER, NOT ELSEWHERE CLASSIFIED: ICD-10-CM

## 2022-08-08 DIAGNOSIS — R10.13 EPIGASTRIC ABDOMINAL PAIN: Primary | ICD-10-CM

## 2022-08-08 DIAGNOSIS — K29.60 REFLUX GASTRITIS: ICD-10-CM

## 2022-08-08 DIAGNOSIS — R79.89 ELEVATED LFTS: ICD-10-CM

## 2022-08-08 PROCEDURE — 99204 OFFICE O/P NEW MOD 45 MIN: CPT | Performed by: INTERNAL MEDICINE

## 2022-08-08 RX ORDER — FAMOTIDINE 40 MG/1
40 TABLET, FILM COATED ORAL
Qty: 30 TABLET | Refills: 3 | Status: SHIPPED | OUTPATIENT
Start: 2022-08-08

## 2022-08-08 RX ORDER — PANTOPRAZOLE SODIUM 40 MG/1
40 TABLET, DELAYED RELEASE ORAL
Qty: 60 TABLET | Refills: 3 | Status: SHIPPED | OUTPATIENT
Start: 2022-08-08

## 2022-08-08 RX ORDER — SUCRALFATE ORAL 1 G/10ML
1 SUSPENSION ORAL 4 TIMES DAILY PRN
Qty: 1200 ML | Refills: 1 | Status: SHIPPED | OUTPATIENT
Start: 2022-08-08

## 2022-08-08 NOTE — PROGRESS NOTES
[facility] - Outpatient Consultation  Dejah Bhat 73 y o  male MRN: [id number]  Encounter: [id number]          ASSESSMENT AND PLAN:    Dejah Bhat is a 73 y o  male who presents with complaint of epigastric pain ongoing for several weeks with severe pain and weight loss  DDx includes PUD vs gastritis vs H pylori vs other  Endoscopy from June 2021 with LA grade C esophagitis and mild erosive gastritis  Biopsies of the stomach and duodenum were normal in the distal esophagus revealed intestinal metaplasia consistent with possible Barretts esophagus versus cardia metaplasia  Colonoscopy at that time revealed diverticulosis  CT scan in the ER revealed an 8 6 cm low-attenuation focus within the spleen not present on CT from May 2022 with the differential including possible splenic abscess versus progression of follicular lymphoma, as well as progression of upper abdominal lymphadenopathy but mesenteric and retroperitoneal lymph node burden relatively unchanged from May  There was also a new 2 6 cm cystic focus deep to the umbilicus  Recent blood work revealed a CMP with AST of 50, ALT of 109, alkaline phosphatase 96, albumin 3 2, T bili 0 3, creatinine 1 28  White blood cell count 10 66, hemoglobin 11 3, normal platelets, MCV 85      1  Epigastric abdominal pain    2  Reflux gastritis    3  Elevated LFTs    4  Diarrhea, unspecified type    5  Weight loss    6  Obesity, morbid ([facility] [address] )    7  Encounter for screening for other viral diseases     8   Fatty (change of) liver, not elsewhere classified         Orders Placed This Encounter   Procedures    Calprotectin,Fecal    H  pylori antigen, stool    Giardia, EIA, Ova/Parasite    Fecal fat, qualitative    Pancreatic elastase, fecal    Clostridium difficile toxin by PCR with EIA    Hepatic function panel    FELTON Screen w/ Reflex to Titer/Pattern    Anti-smooth muscle antibody, IgG    Antimitochondrial antibody    Chronic Hepatitis Panel    BATISTA Fibrosure    Ferritin    Iron    Transferrin    Celiac Disease Antibody Profile     MRI of the abdomen with contrast to evaluate splenic lesion  He will ask his hematologist to order this since he will call for a follow-up soon  Repeat endoscopy  PPI twice a day  Pepcid at bedtime  Carafate up to 4 times a day  Things that can help improved reflux include: avoidance of trigger foods (potential foods include coffee, caffeine, chocolate, mint, tomato-based products, spicy foods, fatty foods), avoid tight fitting clothing, elevated head of bed 30 degrees, avoid eating 2-3 hours prior to bedtime, weight loss, avoid alcohol, avoid tobacco use  Can consider repeat colonoscopy 2031  Follow-up with hematology  Repeat LFTs  Send hepatitis panel, FELTON, ASMA, iron studies, BATISTA fibrosure  Avoid alcohol    ______________________________________________________________________    Referred by Dr Awais Palencia for abdominal/chest pain    HPI:    Kavon Gonzalez is a 73 y o  male with lymphoma, diabetes , CKD, hypertension, Ozuna's esophagus who presents with complaint of abdominal pain    The patient was recently seen in the ER on 08/06 with complaint of chest pain that started approximately 3 weeks ago, with 2 weeks of relatively constant epigastric pain with burning sensation  He is previously noted reflux  He try to some Pepto-Bismol without significant relief and he did note that he recently stopped Protonix without a taper  Today he notes that 3 weeks ago he began having chest pain and discomfort  He waited but the symptoms did not pass  It got better  He waited but it got worse Friday evening  He was given cocktail and it got 90% better  It only lasted for 2 hours  He was given Maalox and some other medications  He had a colonoscopy in the Spring 2021  He was started on pantoprazole  The prescription ran out 3 weeks ago before this started  He replaced it with OTC omeprazole   He felt fine for a little but and then it came on  He had heartburn before but this was different  This was epigastric pain  Better with mylanta and then it comes back  No significant heartburn or acid reflux  The epigastric pain is dull and feels like a sour stomach  He has taken Tylenol PM to help him sleep  No NSAIDs  He has not been eating well  Not worse with eating  Some belching and it helps him feel better for a little bit    + diarrhea  Before this he was having diarrhea  He still has to push to get it out but it is liquid  He has 3 BMs per day  It was more solid before  No BRBPR or black stools  He lost some weight  He was drinking a lot of orange juice  No dysphagia, odynophagia, nausea, vomiting  BRBPR, melena, abdominal pain, weight loss  No FH of colon cancer or other GI related issues (father had GI issues while on chemo for leukemia)    Answers for HPI/ROS submitted by the patient on 8/6/2022  Chronicity: new  Onset: 1 to 4 weeks ago  Onset quality: sudden  Frequency: constantly  Episode duration: 2 weeks  Progression since onset: waxing and waning  Pain location: epigastric region  Pain - numeric: 7/10  Pain quality: aching, dull  Radiates to: does not radiate  anorexia: Yes  arthralgias: No  belching: Yes  constipation: No  diarrhea: Yes  dysuria: No  fever: No  flatus: Yes  frequency: No  headaches: No  hematochezia: No  hematuria: No  melena: No  myalgias: No  nausea:  No  weight loss: No  vomiting: No  Aggravated by: eating  Relieved by: belching, palpation  Diagnostic workup: CT scan, lower endoscopy, upper endoscopy        REVIEW OF SYSTEMS:  10 point ROS reviewed and negative, except as above      Historical Information   Past Medical History:   Diagnosis Date    Chronic kidney disease     Diabetes mellitus ([facility] [address] )     High cholesterol     Hypertension      Past Surgical History:   Procedure Laterality Date    BUNIONECTOMY Right 11/24/2020    Procedure: RIGHT HAV CORRECTION,;  Surgeon: Jamal Tarango DPM; [facility];  Service: Podiatry    INCISION AND DRAINAGE OF WOUND Right 10/5/2016    Procedure: INCISION AND DRAINAGE (I&D) EXTREMITY;  Surgeon: Gila Espino DPM;  [facility];  Service:     IR BIOPSY LYMPH NODE  7/8/2021    IR EMBOLIZATION (SPECIFY VESSEL OR SITE)  7/8/2021    PILONIDAL CYST EXCISION      ME REPAIR OF HAMMERTOE,ONE Right 2/19/2019    Procedure: THIRD HAMMER TOE CORRECTION;  Surgeon: Gila Espino DPM;  [facility];  Service: Podiatry    TOE OSTEOTOMY Right 3/14/2017    Procedure: HAMMERTOE CORRECTION R 2 ;  Surgeon: Gila Espino DPM;  [facility];  Service:     TOE OSTEOTOMY Left 11/24/2020    Procedure: LEFT HT CORRECTION TOE;  Surgeon: Gila Espino DPM;  [facility];  Service: [address]     Social History   Social History     Substance and Sexual Activity   Alcohol Use No     Social History     Substance and Sexual Activity   Drug Use No     Social History     Tobacco Use   Smoking Status Never Smoker   Smokeless Tobacco Never Used   Tobacco Comment    Never smoked but exposed to second hand smoke from birth until 19's     Family History   Problem Relation Age of Onset    Cancer Father         Leukemia       Meds/Allergies       Current Outpatient Medications:     aspirin 81 mg chewable tablet    famotidine (PEPCID) 40 MG tablet    hydrochlorothiazide (HYDRODIURIL) 25 mg tablet    losartan (COZAAR) 100 MG tablet    metFORMIN (GLUCOPHAGE) 500 mg tablet    metoprolol succinate (TOPROL-XL) 50 mg 24 hr tablet    Multiple Vitamins-Minerals (Centrum Silver 50+Men) TABS    pantoprazole (PROTONIX) 40 mg tablet    predniSONE 20 mg tablet    sucralfate (CARAFATE) 1 g tablet    sucralfate (CARAFATE) 1 g/10 mL suspension    allopurinol (ZYLOPRIM) 100 mg tablet    Multiple Vitamin (multivitamin) tablet    simvastatin (ZOCOR) 40 mg tablet    Allergies   Allergen Reactions    Lisinopril Cough           Objective Blood pressure 142/78, pulse 85, temperature 98 3 °F (36 8 °C), height 6' 3" (1 905 m), weight (!) 140 kg (308 lb 6 4 oz), SpO2 97 %  Body mass index is 38 55 kg/m²  PHYSICAL EXAMINATION:    General Appearance:   Alert, cooperative, no distress   HEENT:  Normocephalic, atraumatic, anicteric  Neck supple, symmetrical, trachea midline  Lungs:   Equal chest rise and unlabored breathing, normal effort, no coughing  Cardiovascular:   No visualized JVD  Abdomen:   No abdominal distension but protuberant abdomen   Skin:   No jaundice, rashes, or lesions  Musculoskeletal:   Normal range of motion visualized  Psych:  Normal affect and normal insight  Neuro:  Alert and appropriate  Lab Results:   No visits with results within 1 Day(s) from this visit     Latest known visit with results is:   Admission on 08/06/2022, Discharged on 08/06/2022   Component Date Value    Ventricular Rate 08/06/2022 90     Atrial Rate 08/06/2022 90     AR Interval 08/06/2022 194     QRSD Interval 08/06/2022 92     QT Interval 08/06/2022 364     QTC Interval 08/06/2022 445     P Axis 08/06/2022 48     QRS Axis 08/06/2022 18     T Wave Axis 08/06/2022 72     WBC 08/06/2022 10 66 (A)    RBC 08/06/2022 4 38     Hemoglobin 08/06/2022 11 3 (A)    Hematocrit 08/06/2022 37 0     MCV 08/06/2022 85     MCH 08/06/2022 25 8 (A)    MCHC 08/06/2022 30 5 (A)    RDW 08/06/2022 14 3     MPV 08/06/2022 10 2     Platelets 74/84/8641 295     nRBC 08/06/2022 0     Neutrophils Relative 08/06/2022 80 (A)    Immat GRANS % 08/06/2022 1     Lymphocytes Relative 08/06/2022 7 (A)    Monocytes Relative 08/06/2022 9     Eosinophils Relative 08/06/2022 3     Basophils Relative 08/06/2022 0     Neutrophils Absolute 08/06/2022 8 56 (A)    Immature Grans Absolute 08/06/2022 0 09     Lymphocytes Absolute 08/06/2022 0 75     Monocytes Absolute 08/06/2022 0 94     Eosinophils Absolute 08/06/2022 0 28     Basophils Absolute 08/06/2022 0 04     Sodium 08/06/2022 136     Potassium 08/06/2022 4 5     Chloride 08/06/2022 96     CO2 08/06/2022 31     ANION GAP 08/06/2022 9     BUN 08/06/2022 19     Creatinine 08/06/2022 1 28     Glucose 08/06/2022 192 (A)    Calcium 08/06/2022 10 0     Corrected Calcium 08/06/2022 10 6 (A)    AST 08/06/2022 50 (A)    ALT 08/06/2022 109 (A)    Alkaline Phosphatase 08/06/2022 96     Total Protein 08/06/2022 8 5 (A)    Albumin 08/06/2022 3 2 (A)    Total Bilirubin 08/06/2022 0 30     eGFR 08/06/2022 58     Lipase 08/06/2022 93     hs TnI 0hr 08/06/2022 7     hs TnI 2hr 08/06/2022 7     Delta 2hr hsTnI 08/06/2022 0        Lab Results   Component Value Date    WBC 10 66 (H) 08/06/2022    HGB 11 3 (L) 08/06/2022    HCT 37 0 08/06/2022    MCV 85 08/06/2022     08/06/2022       Lab Results   Component Value Date     10/06/2015    SODIUM 136 08/06/2022    K 4 5 08/06/2022    CL 96 08/06/2022    CO2 31 08/06/2022    ANIONGAP 7 10/06/2015    AGAP 9 08/06/2022    BUN 19 08/06/2022    CREATININE 1 28 08/06/2022    GLUC 192 (H) 08/06/2022    GLUF 142 (H) 05/12/2022    CALCIUM 10 0 08/06/2022    AST 50 (H) 08/06/2022     (H) 08/06/2022    ALKPHOS 96 08/06/2022    PROT 6 8 10/06/2015    TP 8 5 (H) 08/06/2022    BILITOT 0 44 10/06/2015    TBILI 0 30 08/06/2022    EGFR 58 08/06/2022       Lab Results   Component Value Date    CRP >90 0 (H) 10/06/2015       No results found for: [id number], TSH    Lab Results   Component Value Date    IRON 52 (L) 08/10/2021    TIBC 355 08/10/2021    FERRITIN 208 08/10/2021       Radiology Results:   CT abdomen pelvis wo contrast    Result Date: 8/6/2022  Narrative: CT ABDOMEN AND PELVIS WITHOUT IV CONTRAST INDICATION:   Epigastric pain Epigastric pain and tenderness  COMPARISON:  5/12/2022   TECHNIQUE:  CT examination of the abdomen and pelvis was performed without intravenous contrast  Axial, sagittal, and coronal 2D reformatted images were created from the source data and submitted for interpretation  Radiation dose length product (DLP) for this visit:  1580 mGy-cm   This examination, like all CT scans [facility], was performed utilizing techniques to minimize radiation dose exposure, including the use of iterative reconstruction and automated exposure control  Enteric contrast was not administered  FINDINGS: ABDOMEN LOWER CHEST:  No clinically significant abnormality identified in the visualized lower chest  LIVER/BILIARY TREE:  Liver is diffusely decreased in density consistent with fatty change  No CT evidence of suspicious hepatic mass  Normal hepatic contours  No biliary dilatation  GALLBLADDER:  There are gallstone(s) within the gallbladder, without pericholecystic inflammatory changes  SPLEEN:  8 6 cm low-attenuation focus within the spleen which is not present on 5/12/2022  Frannie Roderick PANCREAS:  Unremarkable  ADRENAL GLANDS:  Unremarkable  KIDNEYS/URETERS:  Punctate nonobstructing calculus at the left inferior pole  No hydronephrosis  STOMACH AND BOWEL:  Unremarkable  APPENDIX:  No findings to suggest appendicitis  ABDOMINOPELVIC CAVITY:  No ascites  No pneumoperitoneum  Extensive upper abdominal, mesenteric and retroperitoneal adenopathy    A mesenteric lymph node conglomerate measuring up to 12 2 cm (series 2, image 52) in largest dimension previously measured 11 4 cm  A 2 3 cm celiac axis lymph node was not present previously  A 3 6 cm gastrohepatic lymph node was not present previously  Numerous retroperitoneal lymph nodes are stable, for instance a 2 1 cm aortocaval lymph node (series 2, image 42) presumed measured 2 3 cm    VESSELS:  Unremarkable for patient's age  PELVIS REPRODUCTIVE ORGANS:  Unremarkable for patient's age  URINARY BLADDER:  Unremarkable  ABDOMINAL WALL/INGUINAL REGIONS:  2 6 cm cystic focus immediately deep to the umbilicus  OSSEOUS STRUCTURES:  No acute fracture or destructive osseous lesion  Impression: 8 6 cm low-attenuation focus within the spleen which is not present on CT examination of 5/12/2022  Differential considerations include splenic abscess or progression of patient's follicular lymphoma  This finding can be further evaluated with contrast-enhanced MR abdomen  Progression of upper abdominal lymphadenopathy  Mesenteric and retroperitoneal lymph nodes kennedy burden appears similar to 5/12/2022  New 2 6 cm cystic focus immediately deep to the umbilicus likely to represent additional disease spread    Workstation performed: [id number]     XR hip/pelv 2-3 vws right if performed    Result Date: 7/13/2022  Narrative: RIGHT HIP INDICATION:   M54 [id number]: Sciatica, right side  COMPARISON:  None VIEWS:  XR HIP/PELV 2-3 VWS RIGHT W PELVIS IF PERFORMED Images: 4 FINDINGS: There is no acute fracture or dislocation  Mild bilateral hip arthritis No lytic or blastic osseous lesion  Soft tissues are unremarkable  The visualized lumbar spine is unremarkable       Impression: Mild bilateral hip arthritis No acute displaced fracture Workstation performed: [id number]

## 2022-08-08 NOTE — H&P (VIEW-ONLY)
[facility] - Outpatient Consultation  Reid Hyde 73 y o  male MRN: [id number]  Encounter: [id number]          ASSESSMENT AND PLAN:    Reid Hyde is a 73 y o  male who presents with complaint of epigastric pain ongoing for several weeks with severe pain and weight loss  DDx includes PUD vs gastritis vs H pylori vs other  Endoscopy from June 2021 with LA grade C esophagitis and mild erosive gastritis  Biopsies of the stomach and duodenum were normal in the distal esophagus revealed intestinal metaplasia consistent with possible Barretts esophagus versus cardia metaplasia  Colonoscopy at that time revealed diverticulosis  CT scan in the ER revealed an 8 6 cm low-attenuation focus within the spleen not present on CT from May 2022 with the differential including possible splenic abscess versus progression of follicular lymphoma, as well as progression of upper abdominal lymphadenopathy but mesenteric and retroperitoneal lymph node burden relatively unchanged from May  There was also a new 2 6 cm cystic focus deep to the umbilicus  Recent blood work revealed a CMP with AST of 50, ALT of 109, alkaline phosphatase 96, albumin 3 2, T bili 0 3, creatinine 1 28  White blood cell count 10 66, hemoglobin 11 3, normal platelets, MCV 85      1  Epigastric abdominal pain    2  Reflux gastritis    3  Elevated LFTs    4  Diarrhea, unspecified type    5  Weight loss    6  Obesity, morbid ([facility] [address] )    7  Encounter for screening for other viral diseases     8   Fatty (change of) liver, not elsewhere classified         Orders Placed This Encounter   Procedures    Calprotectin,Fecal    H  pylori antigen, stool    Giardia, EIA, Ova/Parasite    Fecal fat, qualitative    Pancreatic elastase, fecal    Clostridium difficile toxin by PCR with EIA    Hepatic function panel    FELTON Screen w/ Reflex to Titer/Pattern    Anti-smooth muscle antibody, IgG    Antimitochondrial antibody    Chronic Hepatitis Panel    BATISTA Fibrosure    Ferritin    Iron    Transferrin    Celiac Disease Antibody Profile     MRI of the abdomen with contrast to evaluate splenic lesion  He will ask his hematologist to order this since he will call for a follow-up soon  Repeat endoscopy  PPI twice a day  Pepcid at bedtime  Carafate up to 4 times a day  Things that can help improved reflux include: avoidance of trigger foods (potential foods include coffee, caffeine, chocolate, mint, tomato-based products, spicy foods, fatty foods), avoid tight fitting clothing, elevated head of bed 30 degrees, avoid eating 2-3 hours prior to bedtime, weight loss, avoid alcohol, avoid tobacco use  Can consider repeat colonoscopy 2031  Follow-up with hematology  Repeat LFTs  Send hepatitis panel, FELTON, ASMA, iron studies, BATISTA fibrosure  Avoid alcohol    ______________________________________________________________________    Referred by Dr Lala Vee for abdominal/chest pain    HPI:    Kumar Aguilera is a 73 y o  male with lymphoma, diabetes , CKD, hypertension, Ozuna's esophagus who presents with complaint of abdominal pain    The patient was recently seen in the ER on 08/06 with complaint of chest pain that started approximately 3 weeks ago, with 2 weeks of relatively constant epigastric pain with burning sensation  He is previously noted reflux  He try to some Pepto-Bismol without significant relief and he did note that he recently stopped Protonix without a taper  Today he notes that 3 weeks ago he began having chest pain and discomfort  He waited but the symptoms did not pass  It got better  He waited but it got worse Friday evening  He was given cocktail and it got 90% better  It only lasted for 2 hours  He was given Maalox and some other medications  He had a colonoscopy in the Spring 2021  He was started on pantoprazole  The prescription ran out 3 weeks ago before this started  He replaced it with OTC omeprazole   He felt fine for a little but and then it came on  He had heartburn before but this was different  This was epigastric pain  Better with mylanta and then it comes back  No significant heartburn or acid reflux  The epigastric pain is dull and feels like a sour stomach  He has taken Tylenol PM to help him sleep  No NSAIDs  He has not been eating well  Not worse with eating  Some belching and it helps him feel better for a little bit    + diarrhea  Before this he was having diarrhea  He still has to push to get it out but it is liquid  He has 3 BMs per day  It was more solid before  No BRBPR or black stools  He lost some weight  He was drinking a lot of orange juice  No dysphagia, odynophagia, nausea, vomiting  BRBPR, melena, abdominal pain, weight loss  No FH of colon cancer or other GI related issues (father had GI issues while on chemo for leukemia)    Answers for HPI/ROS submitted by the patient on 8/6/2022  Chronicity: new  Onset: 1 to 4 weeks ago  Onset quality: sudden  Frequency: constantly  Episode duration: 2 weeks  Progression since onset: waxing and waning  Pain location: epigastric region  Pain - numeric: 7/10  Pain quality: aching, dull  Radiates to: does not radiate  anorexia: Yes  arthralgias: No  belching: Yes  constipation: No  diarrhea: Yes  dysuria: No  fever: No  flatus: Yes  frequency: No  headaches: No  hematochezia: No  hematuria: No  melena: No  myalgias: No  nausea:  No  weight loss: No  vomiting: No  Aggravated by: eating  Relieved by: belching, palpation  Diagnostic workup: CT scan, lower endoscopy, upper endoscopy        REVIEW OF SYSTEMS:  10 point ROS reviewed and negative, except as above      Historical Information   Past Medical History:   Diagnosis Date    Chronic kidney disease     Diabetes mellitus ([facility] [address] )     High cholesterol     Hypertension      Past Surgical History:   Procedure Laterality Date    BUNIONECTOMY Right 11/24/2020    Procedure: RIGHT HAV CORRECTION,;  Surgeon: Jamal Tarango DPM; [facility];  Service: Podiatry    INCISION AND DRAINAGE OF WOUND Right 10/5/2016    Procedure: INCISION AND DRAINAGE (I&D) EXTREMITY;  Surgeon: Eduardo Ruelas DPM;  [facility];  Service:     IR BIOPSY LYMPH NODE  7/8/2021    IR EMBOLIZATION (SPECIFY VESSEL OR SITE)  7/8/2021    PILONIDAL CYST EXCISION      IA REPAIR OF HAMMERTOE,ONE Right 2/19/2019    Procedure: THIRD HAMMER TOE CORRECTION;  Surgeon: Eduardo Ruelas DPM;  [facility];  Service: Podiatry    TOE OSTEOTOMY Right 3/14/2017    Procedure: HAMMERTOE CORRECTION R 2 ;  Surgeon: Eduardo Ruelas DPM;  [facility];  Service:     TOE OSTEOTOMY Left 11/24/2020    Procedure: LEFT HT CORRECTION TOE;  Surgeon: Eduardo Ruelas DPM;  [facility];  Service: [address]     Social History   Social History     Substance and Sexual Activity   Alcohol Use No     Social History     Substance and Sexual Activity   Drug Use No     Social History     Tobacco Use   Smoking Status Never Smoker   Smokeless Tobacco Never Used   Tobacco Comment    Never smoked but exposed to second hand smoke from birth until 19's     Family History   Problem Relation Age of Onset    Cancer Father         Leukemia       Meds/Allergies       Current Outpatient Medications:     aspirin 81 mg chewable tablet    famotidine (PEPCID) 40 MG tablet    hydrochlorothiazide (HYDRODIURIL) 25 mg tablet    losartan (COZAAR) 100 MG tablet    metFORMIN (GLUCOPHAGE) 500 mg tablet    metoprolol succinate (TOPROL-XL) 50 mg 24 hr tablet    Multiple Vitamins-Minerals (Centrum Silver 50+Men) TABS    pantoprazole (PROTONIX) 40 mg tablet    predniSONE 20 mg tablet    sucralfate (CARAFATE) 1 g tablet    sucralfate (CARAFATE) 1 g/10 mL suspension    allopurinol (ZYLOPRIM) 100 mg tablet    Multiple Vitamin (multivitamin) tablet    simvastatin (ZOCOR) 40 mg tablet    Allergies   Allergen Reactions    Lisinopril Cough           Objective Blood pressure 142/78, pulse 85, temperature 98 3 °F (36 8 °C), height 6' 3" (1 905 m), weight (!) 140 kg (308 lb 6 4 oz), SpO2 97 %  Body mass index is 38 55 kg/m²  PHYSICAL EXAMINATION:    General Appearance:   Alert, cooperative, no distress   HEENT:  Normocephalic, atraumatic, anicteric  Neck supple, symmetrical, trachea midline  Lungs:   Equal chest rise and unlabored breathing, normal effort, no coughing  Cardiovascular:   No visualized JVD  Abdomen:   No abdominal distension but protuberant abdomen   Skin:   No jaundice, rashes, or lesions  Musculoskeletal:   Normal range of motion visualized  Psych:  Normal affect and normal insight  Neuro:  Alert and appropriate  Lab Results:   No visits with results within 1 Day(s) from this visit     Latest known visit with results is:   Admission on 08/06/2022, Discharged on 08/06/2022   Component Date Value    Ventricular Rate 08/06/2022 90     Atrial Rate 08/06/2022 90     OR Interval 08/06/2022 194     QRSD Interval 08/06/2022 92     QT Interval 08/06/2022 364     QTC Interval 08/06/2022 445     P Axis 08/06/2022 48     QRS Axis 08/06/2022 18     T Wave Axis 08/06/2022 72     WBC 08/06/2022 10 66 (A)    RBC 08/06/2022 4 38     Hemoglobin 08/06/2022 11 3 (A)    Hematocrit 08/06/2022 37 0     MCV 08/06/2022 85     MCH 08/06/2022 25 8 (A)    MCHC 08/06/2022 30 5 (A)    RDW 08/06/2022 14 3     MPV 08/06/2022 10 2     Platelets 99/11/4794 295     nRBC 08/06/2022 0     Neutrophils Relative 08/06/2022 80 (A)    Immat GRANS % 08/06/2022 1     Lymphocytes Relative 08/06/2022 7 (A)    Monocytes Relative 08/06/2022 9     Eosinophils Relative 08/06/2022 3     Basophils Relative 08/06/2022 0     Neutrophils Absolute 08/06/2022 8 56 (A)    Immature Grans Absolute 08/06/2022 0 09     Lymphocytes Absolute 08/06/2022 0 75     Monocytes Absolute 08/06/2022 0 94     Eosinophils Absolute 08/06/2022 0 28     Basophils Absolute 08/06/2022 0 04     Sodium 08/06/2022 136     Potassium 08/06/2022 4 5     Chloride 08/06/2022 96     CO2 08/06/2022 31     ANION GAP 08/06/2022 9     BUN 08/06/2022 19     Creatinine 08/06/2022 1 28     Glucose 08/06/2022 192 (A)    Calcium 08/06/2022 10 0     Corrected Calcium 08/06/2022 10 6 (A)    AST 08/06/2022 50 (A)    ALT 08/06/2022 109 (A)    Alkaline Phosphatase 08/06/2022 96     Total Protein 08/06/2022 8 5 (A)    Albumin 08/06/2022 3 2 (A)    Total Bilirubin 08/06/2022 0 30     eGFR 08/06/2022 58     Lipase 08/06/2022 93     hs TnI 0hr 08/06/2022 7     hs TnI 2hr 08/06/2022 7     Delta 2hr hsTnI 08/06/2022 0        Lab Results   Component Value Date    WBC 10 66 (H) 08/06/2022    HGB 11 3 (L) 08/06/2022    HCT 37 0 08/06/2022    MCV 85 08/06/2022     08/06/2022       Lab Results   Component Value Date     10/06/2015    SODIUM 136 08/06/2022    K 4 5 08/06/2022    CL 96 08/06/2022    CO2 31 08/06/2022    ANIONGAP 7 10/06/2015    AGAP 9 08/06/2022    BUN 19 08/06/2022    CREATININE 1 28 08/06/2022    GLUC 192 (H) 08/06/2022    GLUF 142 (H) 05/12/2022    CALCIUM 10 0 08/06/2022    AST 50 (H) 08/06/2022     (H) 08/06/2022    ALKPHOS 96 08/06/2022    PROT 6 8 10/06/2015    TP 8 5 (H) 08/06/2022    BILITOT 0 44 10/06/2015    TBILI 0 30 08/06/2022    EGFR 58 08/06/2022       Lab Results   Component Value Date    CRP >90 0 (H) 10/06/2015       No results found for: [id number], TSH    Lab Results   Component Value Date    IRON 52 (L) 08/10/2021    TIBC 355 08/10/2021    FERRITIN 208 08/10/2021       Radiology Results:   CT abdomen pelvis wo contrast    Result Date: 8/6/2022  Narrative: CT ABDOMEN AND PELVIS WITHOUT IV CONTRAST INDICATION:   Epigastric pain Epigastric pain and tenderness  COMPARISON:  5/12/2022   TECHNIQUE:  CT examination of the abdomen and pelvis was performed without intravenous contrast  Axial, sagittal, and coronal 2D reformatted images were created from the source data and submitted for interpretation  Radiation dose length product (DLP) for this visit:  1580 mGy-cm   This examination, like all CT scans performed in the [facility], was performed utilizing techniques to minimize radiation dose exposure, including the use of iterative reconstruction and automated exposure control  Enteric contrast was not administered  FINDINGS: ABDOMEN LOWER CHEST:  No clinically significant abnormality identified in the visualized lower chest  LIVER/BILIARY TREE:  Liver is diffusely decreased in density consistent with fatty change  No CT evidence of suspicious hepatic mass  Normal hepatic contours  No biliary dilatation  GALLBLADDER:  There are gallstone(s) within the gallbladder, without pericholecystic inflammatory changes  SPLEEN:  8 6 cm low-attenuation focus within the spleen which is not present on 5/12/2022  Lajean Cassette PANCREAS:  Unremarkable  ADRENAL GLANDS:  Unremarkable  KIDNEYS/URETERS:  Punctate nonobstructing calculus at the left inferior pole  No hydronephrosis  STOMACH AND BOWEL:  Unremarkable  APPENDIX:  No findings to suggest appendicitis  ABDOMINOPELVIC CAVITY:  No ascites  No pneumoperitoneum  Extensive upper abdominal, mesenteric and retroperitoneal adenopathy    A mesenteric lymph node conglomerate measuring up to 12 2 cm (series 2, image 52) in largest dimension previously measured 11 4 cm  A 2 3 cm celiac axis lymph node was not present previously  A 3 6 cm gastrohepatic lymph node was not present previously  Numerous retroperitoneal lymph nodes are stable, for instance a 2 1 cm aortocaval lymph node (series 2, image 42) presumed measured 2 3 cm    VESSELS:  Unremarkable for patient's age  PELVIS REPRODUCTIVE ORGANS:  Unremarkable for patient's age  URINARY BLADDER:  Unremarkable  ABDOMINAL WALL/INGUINAL REGIONS:  2 6 cm cystic focus immediately deep to the umbilicus  OSSEOUS STRUCTURES:  No acute fracture or destructive osseous lesion  Impression: 8 6 cm low-attenuation focus within the spleen which is not present on CT examination of 5/12/2022  Differential considerations include splenic abscess or progression of patient's follicular lymphoma  This finding can be further evaluated with contrast-enhanced MR abdomen  Progression of upper abdominal lymphadenopathy  Mesenteric and retroperitoneal lymph nodes kennedy burden appears similar to 5/12/2022  New 2 6 cm cystic focus immediately deep to the umbilicus likely to represent additional disease spread    Workstation performed: [id number]     XR hip/pelv 2-3 vws right if performed    Result Date: 7/13/2022  Narrative: RIGHT HIP INDICATION:   M54 31: Sciatica, right side  COMPARISON:  None VIEWS:  XR HIP/PELV 2-3 VWS RIGHT W PELVIS IF PERFORMED Images: 4 FINDINGS: There is no acute fracture or dislocation  Mild bilateral hip arthritis No lytic or blastic osseous lesion  Soft tissues are unremarkable  The visualized lumbar spine is unremarkable       Impression: Mild bilateral hip arthritis No acute displaced fracture Workstation performed: [id number]

## 2022-08-10 ENCOUNTER — LAB (OUTPATIENT)
Dept: LAB | Facility: HOSPITAL | Age: 67
End: 2022-08-10
Payer: MEDICARE

## 2022-08-10 DIAGNOSIS — R10.13 EPIGASTRIC ABDOMINAL PAIN: ICD-10-CM

## 2022-08-10 DIAGNOSIS — K76.0 FATTY (CHANGE OF) LIVER, NOT ELSEWHERE CLASSIFIED: ICD-10-CM

## 2022-08-10 DIAGNOSIS — R19.7 DIARRHEA, UNSPECIFIED TYPE: ICD-10-CM

## 2022-08-10 DIAGNOSIS — Z11.59 ENCOUNTER FOR SCREENING FOR OTHER VIRAL DISEASES: ICD-10-CM

## 2022-08-10 DIAGNOSIS — R63.4 WEIGHT LOSS: ICD-10-CM

## 2022-08-10 DIAGNOSIS — R79.89 ELEVATED LFTS: ICD-10-CM

## 2022-08-10 LAB
ALBUMIN SERPL BCP-MCNC: 3 G/DL (ref 3.5–5)
ALP SERPL-CCNC: 105 U/L (ref 46–116)
ALT SERPL W P-5'-P-CCNC: 79 U/L (ref 12–78)
AST SERPL W P-5'-P-CCNC: 35 U/L (ref 5–45)
BILIRUB DIRECT SERPL-MCNC: 0.15 MG/DL (ref 0–0.2)
BILIRUB SERPL-MCNC: 0.31 MG/DL (ref 0.2–1)
FERRITIN SERPL-MCNC: 1192 NG/ML (ref 8–388)
IRON SERPL-MCNC: 36 UG/DL (ref 65–175)
PROT SERPL-MCNC: 8.2 G/DL (ref 6.4–8.4)
TRANSFERRIN SERPL-MCNC: 164 MG/DL (ref 200–400)

## 2022-08-10 PROCEDURE — 82172 ASSAY OF APOLIPOPROTEIN: CPT

## 2022-08-10 PROCEDURE — 86704 HEP B CORE ANTIBODY TOTAL: CPT

## 2022-08-10 PROCEDURE — 82247 BILIRUBIN TOTAL: CPT

## 2022-08-10 PROCEDURE — 82465 ASSAY BLD/SERUM CHOLESTEROL: CPT

## 2022-08-10 PROCEDURE — 82728 ASSAY OF FERRITIN: CPT

## 2022-08-10 PROCEDURE — 83883 ASSAY NEPHELOMETRY NOT SPEC: CPT

## 2022-08-10 PROCEDURE — 36415 COLL VENOUS BLD VENIPUNCTURE: CPT

## 2022-08-10 PROCEDURE — 82784 ASSAY IGA/IGD/IGG/IGM EACH: CPT

## 2022-08-10 PROCEDURE — 86258 DGP ANTIBODY EACH IG CLASS: CPT

## 2022-08-10 PROCEDURE — 86705 HEP B CORE ANTIBODY IGM: CPT

## 2022-08-10 PROCEDURE — 86364 TISS TRNSGLTMNASE EA IG CLAS: CPT

## 2022-08-10 PROCEDURE — 84450 TRANSFERASE (AST) (SGOT): CPT

## 2022-08-10 PROCEDURE — 82947 ASSAY GLUCOSE BLOOD QUANT: CPT

## 2022-08-10 PROCEDURE — 86381 MITOCHONDRIAL ANTIBODY EACH: CPT

## 2022-08-10 PROCEDURE — 86803 HEPATITIS C AB TEST: CPT

## 2022-08-10 PROCEDURE — 86231 EMA EACH IG CLASS: CPT

## 2022-08-10 PROCEDURE — 82977 ASSAY OF GGT: CPT

## 2022-08-10 PROCEDURE — 83540 ASSAY OF IRON: CPT

## 2022-08-10 PROCEDURE — 86015 ACTIN ANTIBODY EACH: CPT

## 2022-08-10 PROCEDURE — 83010 ASSAY OF HAPTOGLOBIN QUANT: CPT

## 2022-08-10 PROCEDURE — 86038 ANTINUCLEAR ANTIBODIES: CPT

## 2022-08-10 PROCEDURE — 84460 ALANINE AMINO (ALT) (SGPT): CPT

## 2022-08-10 PROCEDURE — 84478 ASSAY OF TRIGLYCERIDES: CPT

## 2022-08-10 PROCEDURE — 87340 HEPATITIS B SURFACE AG IA: CPT

## 2022-08-10 PROCEDURE — 80076 HEPATIC FUNCTION PANEL: CPT

## 2022-08-10 PROCEDURE — 84466 ASSAY OF TRANSFERRIN: CPT

## 2022-08-11 LAB
ACTIN IGG SERPL-ACNC: 7 UNITS (ref 0–19)
ENDOMYSIUM IGA SER QL: NEGATIVE
GLIADIN PEPTIDE IGA SER-ACNC: 6 UNITS (ref 0–19)
GLIADIN PEPTIDE IGG SER-ACNC: 2 UNITS (ref 0–19)
HBV CORE AB SER QL: NORMAL
HBV CORE IGM SER QL: NORMAL
HBV SURFACE AG SER QL: NORMAL
HCV AB SER QL: NORMAL
IGA SERPL-MCNC: 248 MG/DL (ref 61–437)
MITOCHONDRIA M2 IGG SER-ACNC: <20 UNITS (ref 0–20)
RYE IGE QN: NEGATIVE
TTG IGA SER-ACNC: <2 U/ML (ref 0–3)
TTG IGG SER-ACNC: <2 U/ML (ref 0–5)

## 2022-08-12 DIAGNOSIS — R59.0 LYMPHADENOPATHY, ABDOMINAL: Primary | ICD-10-CM

## 2022-08-12 DIAGNOSIS — C85.90 LYMPHOMA, UNSPECIFIED BODY REGION, UNSPECIFIED LYMPHOMA TYPE (HCC): ICD-10-CM

## 2022-08-12 DIAGNOSIS — R77.8 HIGH SERUM HAPTOGLOBIN: ICD-10-CM

## 2022-08-12 LAB
A2 MACROGLOB SERPL-MCNC: 213 MG/DL (ref 110–276)
ALT SERPL W P-5'-P-CCNC: 70 IU/L (ref 0–55)
APO A-I SERPL-MCNC: 74 MG/DL (ref 101–178)
AST SERPL W P-5'-P-CCNC: 40 IU/L (ref 0–40)
BILIRUB SERPL-MCNC: 0.2 MG/DL (ref 0–1.2)
CHOLEST SERPL-MCNC: 156 MG/DL (ref 100–199)
FIBROSIS SCORING:: ABNORMAL
FIBROSIS STAGE SERPL QL: ABNORMAL
GGT SERPL-CCNC: 68 IU/L (ref 0–65)
GLUCOSE SERPL-MCNC: 153 MG/DL (ref 65–99)
HAPTOGLOB SERPL-MCNC: 599 MG/DL (ref 32–363)
LABORATORY COMMENT REPORT: ABNORMAL
LIVER FIBR SCORE SERPL CALC.FIBROSURE: 0.26 (ref 0–0.21)
NECROINFLAMMATORY ACT GRADE SERPL QL: ABNORMAL
NECROINFLAMMATORY ACT SCORE SERPL: 0.5
SERVICE CMNT-IMP: ABNORMAL
SL AMB INTERPRETATION: ABNORMAL
SL AMB NASH SCORING: ABNORMAL
SL AMB STEATOSIS GRADE: ABNORMAL
SL AMB STEATOSIS SCORE: 0.93 (ref 0–0.3)
STEATOSIS GRADING: ABNORMAL
TRIGL SERPL-MCNC: 231 MG/DL (ref 0–149)

## 2022-08-13 ENCOUNTER — HOSPITAL ENCOUNTER (OUTPATIENT)
Dept: MRI IMAGING | Facility: HOSPITAL | Age: 67
Discharge: HOME | End: 2022-08-13
Payer: MEDICARE

## 2022-08-13 DIAGNOSIS — M54.31 RIGHT SIDED SCIATICA: ICD-10-CM

## 2022-08-13 PROCEDURE — 72148 MRI LUMBAR SPINE W/O DYE: CPT

## 2022-08-15 ENCOUNTER — ANESTHESIA EVENT (OUTPATIENT)
Dept: GASTROENTEROLOGY | Facility: HOSPITAL | Age: 67
End: 2022-08-15

## 2022-08-15 ENCOUNTER — ANESTHESIA (OUTPATIENT)
Dept: GASTROENTEROLOGY | Facility: HOSPITAL | Age: 67
End: 2022-08-15

## 2022-08-15 ENCOUNTER — HOSPITAL ENCOUNTER (OUTPATIENT)
Dept: GASTROENTEROLOGY | Facility: HOSPITAL | Age: 67
Setting detail: OUTPATIENT SURGERY
Discharge: HOME | End: 2022-08-15
Attending: INTERNAL MEDICINE
Payer: MEDICARE

## 2022-08-15 VITALS
DIASTOLIC BLOOD PRESSURE: 58 MMHG | RESPIRATION RATE: 18 BRPM | SYSTOLIC BLOOD PRESSURE: 110 MMHG | TEMPERATURE: 97.6 F | HEART RATE: 74 BPM | HEIGHT: 75 IN | OXYGEN SATURATION: 95 % | BODY MASS INDEX: 38.3 KG/M2 | WEIGHT: 308 LBS

## 2022-08-15 DIAGNOSIS — R10.13 EPIGASTRIC ABDOMINAL PAIN: ICD-10-CM

## 2022-08-15 DIAGNOSIS — E66.01 OBESITY, MORBID (HCC): Primary | ICD-10-CM

## 2022-08-15 PROBLEM — C82.08 FOLLICULAR LYMPHOMA GRADE I OF LYMPH NODES OF MULTIPLE SITES (HCC): Status: ACTIVE | Noted: 2022-08-15

## 2022-08-15 LAB — GLUCOSE SERPL-MCNC: 143 MG/DL (ref 65–140)

## 2022-08-15 PROCEDURE — 43239 EGD BIOPSY SINGLE/MULTIPLE: CPT | Performed by: INTERNAL MEDICINE

## 2022-08-15 PROCEDURE — 88313 SPECIAL STAINS GROUP 2: CPT | Performed by: PATHOLOGY

## 2022-08-15 PROCEDURE — 88342 IMHCHEM/IMCYTCHM 1ST ANTB: CPT | Performed by: PATHOLOGY

## 2022-08-15 PROCEDURE — 88305 TISSUE EXAM BY PATHOLOGIST: CPT | Performed by: PATHOLOGY

## 2022-08-15 PROCEDURE — 82948 REAGENT STRIP/BLOOD GLUCOSE: CPT

## 2022-08-15 RX ORDER — SODIUM CHLORIDE, SODIUM LACTATE, POTASSIUM CHLORIDE, CALCIUM CHLORIDE 600; 310; 30; 20 MG/100ML; MG/100ML; MG/100ML; MG/100ML
100 INJECTION, SOLUTION INTRAVENOUS CONTINUOUS
Status: DISCONTINUED | OUTPATIENT
Start: 2022-08-15 | End: 2022-08-19 | Stop reason: HOSPADM

## 2022-08-15 RX ORDER — PROPOFOL 10 MG/ML
INJECTION, EMULSION INTRAVENOUS AS NEEDED
Status: DISCONTINUED | OUTPATIENT
Start: 2022-08-15 | End: 2022-08-15

## 2022-08-15 RX ORDER — LIDOCAINE HYDROCHLORIDE 20 MG/ML
SOLUTION OROPHARYNGEAL
Status: DISPENSED
Start: 2022-08-15 | End: 2022-08-15

## 2022-08-15 RX ORDER — LIDOCAINE HYDROCHLORIDE 20 MG/ML
INJECTION, SOLUTION EPIDURAL; INFILTRATION; INTRACAUDAL; PERINEURAL AS NEEDED
Status: DISCONTINUED | OUTPATIENT
Start: 2022-08-15 | End: 2022-08-15

## 2022-08-15 RX ORDER — LIDOCAINE HYDROCHLORIDE 20 MG/ML
SOLUTION OROPHARYNGEAL CODE/TRAUMA/SEDATION MEDICATION
Status: COMPLETED | OUTPATIENT
Start: 2022-08-15 | End: 2022-08-15

## 2022-08-15 RX ADMIN — SODIUM CHLORIDE, POTASSIUM CHLORIDE, SODIUM LACTATE AND CALCIUM CHLORIDE: 600; 310; 30; 20 INJECTION, SOLUTION INTRAVENOUS at 10:11

## 2022-08-15 RX ADMIN — PROPOFOL 150 MG: 10 INJECTION, EMULSION INTRAVENOUS at 10:32

## 2022-08-15 RX ADMIN — PROPOFOL 100 MG: 10 INJECTION, EMULSION INTRAVENOUS at 10:38

## 2022-08-15 RX ADMIN — SODIUM CHLORIDE, POTASSIUM CHLORIDE, SODIUM LACTATE AND CALCIUM CHLORIDE 100 ML/HR: 600; 310; 30; 20 INJECTION, SOLUTION INTRAVENOUS at 09:36

## 2022-08-15 RX ADMIN — PROPOFOL 50 MG: 10 INJECTION, EMULSION INTRAVENOUS at 10:44

## 2022-08-15 RX ADMIN — LIDOCAINE HYDROCHLORIDE 15 ML: 20 SOLUTION ORAL; TOPICAL at 10:32

## 2022-08-15 RX ADMIN — LIDOCAINE HYDROCHLORIDE 100 MG: 20 INJECTION, SOLUTION EPIDURAL; INFILTRATION; INTRACAUDAL; PERINEURAL at 10:31

## 2022-08-15 NOTE — INTERVAL H&P NOTE
H&P reviewed  After examining the patient I find no changes in the patients condition since the H&P had been written  Patient reports continued pain which seems to be more in the lower sternal region just to the left of the right breast   Applying pressure to this region seems to help  Medication such as omeprazole, Carafate, and Pepcid seemed to take his pain from a 7 down to a 4  Lying down makes it worse sitting up makes it better  Eating does not seem to bother this discomfort  He did lose 20 lb has been eating less  Denies nausea vomiting associated with it  Applying pressure to this area seems to help the pain  Palpating the sternal region seemed to make him feel better  There is some tenderness in the upper abdomen  He does have a protuberant obese abdomen  There is no rebound or guarding  He was seen by Anesthesia is stable for his procedure    We did discuss the need to follow-up with hematology      Vitals:    08/15/22 0927   BP: 145/67   Pulse: 86   Resp: 18   Temp: 97 6 °F (36 4 °C)   SpO2: 98%

## 2022-08-15 NOTE — ANESTHESIA POSTPROCEDURE EVALUATION
Post-Op Assessment Note    CV Status:  Stable  Pain Score: 0    Pain management: adequate     Mental Status:  Awake   Hydration Status:  Euvolemic   PONV Controlled:  Controlled   Airway Patency:  Patent      Post Op Vitals Reviewed: Yes      Staff: CRNA         No complications documented      BP   96/55   Temp      Pulse  60   Resp   12   SpO2   98

## 2022-08-15 NOTE — ANESTHESIA POSTPROCEDURE EVALUATION
Post-Op Assessment Note    CV Status:  Stable  Pain Score: 0    Pain management: adequate     Mental Status:  Awake   Hydration Status:  Euvolemic   PONV Controlled:  Controlled   Airway Patency:  Patent      Post Op Vitals Reviewed: Yes      Staff: CRNA         No complications documented      BP   90/47   Temp      Pulse  62   Resp   12   SpO2   96

## 2022-08-15 NOTE — ANESTHESIA PREPROCEDURE EVALUATION
Procedure:  EGD    Decreased appetite, weight loss  Denies nausea/vomiting/dysphagia    Relevant Problems   CARDIO   (+) Chronic venous hypertension (idiopathic) with ulcer of right lower extremity (HCC)   (+) Heart murmur   (+) Hypercholesteremia   (+) Hypertension      ENDO   (+) Diabetes 1 5, managed as type 2 (HCC)   (+) Type 2 diabetes mellitus (HCC)      /RENAL   (+) Hypertensive kidney disease with stage 3 chronic kidney disease (HCC)   (+) Stage 3a chronic kidney disease (HCC)      HEMATOLOGY   (+) Follicular lymphoma grade I of lymph nodes of multiple sites (HCC)      MUSCULOSKELETAL   (+) Gout      NEURO/PSYCH   (+) Diabetic peripheral neuropathy (HCC)      Other   (+) Mesenteric lymphadenopathy        Physical Exam    Airway    Mallampati score: III  TM Distance: >3 FB       Dental       Cardiovascular      Pulmonary      Other Findings        Anesthesia Plan  ASA Score- 3     Anesthesia Type- IV sedation with anesthesia with ASA Monitors           Additional Monitors:   Airway Plan:     Comment: Recent labs personally reviewed:  Lab Results       Component                Value               Date                       WBC                      10 66 (H)           08/06/2022                 HGB                      11 3 (L)            08/06/2022                 PLT                      295                 08/06/2022            Lab Results       Component                Value               Date                       NA                       141                 10/06/2015                 K                        4 5                 08/06/2022                 BUN                      19                  08/06/2022                 CREATININE               1 28                08/06/2022                 GLUCOSE                  217 (H)             07/08/2021            Lab Results       Component                Value               Date                       PTT                      41 (H)              07/19/2021 Lab Results       Component                Value               Date                       INR                      1 28 (H)            07/19/2021              Blood type Chandler Matt I, Gris Banegas MD, have personally seen and evaluated the patient prior to anesthetic care  I have reviewed the pre-anesthetic record, medical history, allergies, medications and any other medical records if appropriate to the anesthetic care  If a CRNA is involved in the case, I have reviewed the CRNA assessment, if present, and agree  Patient consented for IV Sedation, general anesthesia as back up  Discussed risks of aspiration, IV infiltration, indications for conversion to general anesthesia  All questions and concerns addressed          Plan Factors-Exercise tolerance (METS): >4 METS  Chart reviewed  EKG reviewed  Imaging results reviewed  Existing labs reviewed  Patient summary reviewed  Patient is not a current smoker  Patient did not smoke on day of surgery  Obstructive sleep apnea risk education given perioperatively  Induction- intravenous  Postoperative Plan-     Informed Consent- Anesthetic plan and risks discussed with patient  I personally reviewed this patient with the CRNA  Discussed and agreed on the Anesthesia Plan with the CRNA  Isra Ovalles

## 2022-08-16 ENCOUNTER — APPOINTMENT (OUTPATIENT)
Dept: LAB | Facility: HOSPITAL | Age: 67
End: 2022-08-16
Payer: MEDICARE

## 2022-08-16 ENCOUNTER — TELEPHONE (OUTPATIENT)
Dept: HEMATOLOGY ONCOLOGY | Facility: CLINIC | Age: 67
End: 2022-08-16

## 2022-08-16 PROCEDURE — 83993 ASSAY FOR CALPROTECTIN FECAL: CPT

## 2022-08-16 PROCEDURE — 87338 HPYLORI STOOL AG IA: CPT

## 2022-08-16 PROCEDURE — 82705 FATS/LIPIDS FECES QUAL: CPT

## 2022-08-16 PROCEDURE — 87493 C DIFF AMPLIFIED PROBE: CPT

## 2022-08-16 PROCEDURE — 82653 EL-1 FECAL QUANTITATIVE: CPT

## 2022-08-16 NOTE — TELEPHONE ENCOUNTER
Call out to patient in regards to request and questions  Reviewed patient has an appointment with GI on 8/17/22  Will have patient call me directly after GI appointment to further discuss

## 2022-08-16 NOTE — TELEPHONE ENCOUNTER
CALL RETURN FORM   Reason for patient call? Patient is calling in indicating that he has been experiencing  Chest pain for 4 weeks and having stomach issues    Patient's primary oncologist?  Bobby Freedman    Name of person the patient was calling for? Howard Huang    Any additional information to add, if applicable?  He tried to make an appointment with GI but was advised to make an appointment with us    Informed patient that the message will be forwarded to the team and someone will get back to them as soon as possible    Did you relay this information to the patient?  yes, patient can be reached back at [phone]

## 2022-08-17 ENCOUNTER — OFFICE VISIT (OUTPATIENT)
Dept: GASTROENTEROLOGY | Facility: CLINIC | Age: 67
End: 2022-08-17
Payer: MEDICARE

## 2022-08-17 VITALS
WEIGHT: 303.2 LBS | HEART RATE: 73 BPM | DIASTOLIC BLOOD PRESSURE: 71 MMHG | TEMPERATURE: 96.8 F | OXYGEN SATURATION: 96 % | SYSTOLIC BLOOD PRESSURE: 114 MMHG | BODY MASS INDEX: 37.7 KG/M2 | HEIGHT: 75 IN

## 2022-08-17 DIAGNOSIS — C85.90 LYMPHOMA, UNSPECIFIED BODY REGION, UNSPECIFIED LYMPHOMA TYPE (HCC): ICD-10-CM

## 2022-08-17 DIAGNOSIS — R59.0 LYMPHADENOPATHY, ABDOMINAL: ICD-10-CM

## 2022-08-17 DIAGNOSIS — R77.8 HIGH SERUM HAPTOGLOBIN: ICD-10-CM

## 2022-08-17 DIAGNOSIS — R93.89 ABNORMAL CT OF THE CHEST: ICD-10-CM

## 2022-08-17 DIAGNOSIS — R10.13 EPIGASTRIC ABDOMINAL PAIN: ICD-10-CM

## 2022-08-17 DIAGNOSIS — R19.7 DIARRHEA, UNSPECIFIED TYPE: ICD-10-CM

## 2022-08-17 DIAGNOSIS — R79.89 ELEVATED LFTS: ICD-10-CM

## 2022-08-17 DIAGNOSIS — K76.0 FATTY (CHANGE OF) LIVER, NOT ELSEWHERE CLASSIFIED: ICD-10-CM

## 2022-08-17 DIAGNOSIS — K29.60 REFLUX GASTRITIS: Primary | ICD-10-CM

## 2022-08-17 LAB
C DIFF TOX GENS STL QL NAA+PROBE: NEGATIVE
H PYLORI AG STL QL IA: NEGATIVE

## 2022-08-17 PROCEDURE — 99214 OFFICE O/P EST MOD 30 MIN: CPT | Performed by: INTERNAL MEDICINE

## 2022-08-17 RX ORDER — FLUTICASONE PROPIONATE 50 MCG
SPRAY, SUSPENSION (ML) NASAL
COMMUNITY
Start: 2022-08-06

## 2022-08-17 RX ORDER — AMLODIPINE BESYLATE 10 MG/1
TABLET ORAL
COMMUNITY
Start: 2022-07-26

## 2022-08-17 NOTE — TELEPHONE ENCOUNTER
VM received on 8/17/22 from patient  Dr Jessica Archuleta out of office for the day and will review in the morning  "Jed Blakely  My name is Denver Clos, birth date [id number]  I had called you yesterday about making an appointment with Doctor Robert Fernández, my oncologist  Ayanna Pradhan had mentioned if you recall yesterday that I was I was scheduled to see my GI doctor, Doctor Armen Dc today, which I just did and you said to call after my appointment with him  Doctor Armen Dc and the GI Doctor was not really able to find what my issue is  Both he, my primary and the doctor that stopped me the on Monday all suggested I see my oncologist due to those issues  If you could call me back My number is [phone] again  This is Felicia leong we had talked yesterday  Thank you, Reid Vidal "

## 2022-08-17 NOTE — PROGRESS NOTES
[facility] - Outpatient Follow-up Note  Alton Thao 73 y o  male MRN: [id number]  Encounter: [id number]          ASSESSMENT AND PLAN:    Alton Thao is a 73 y o  male who presents with complaint of epigastric pain and weight loss for several weeks here for follow-up  He has in the past few days had some improvement of his symptoms and he believes the medications are starting to help  Endoscopy from June 2021 with LA grade C esophagitis and mild erosive gastritis  Biopsies with intestinal metaplasia consistent with Ozuna's esophagus versus cardia metaplasia  Colonoscopy with diverticulosis  Recent CT with splenic lesion noted and upper abdominal lymphadenopathy  Cystic lesion deep to the umbilicus possible disease spread  Recent EGD 8/15 with gastric erythema, 1cm hiatal hernia, nodularity at the GE junction s/p biopsy  CMP with elevated AST, ALT, albumin 3 2  Repeat LFTs with improved AST and ALT and albumin 3  GGT 68  Negative celiac serologies  Ferritin elevated to 1192, iron 36  CBC with WBC 10 66, Hgb 11 3, Plt 295  Haptoglobin 599  Negative viral hepatitis serologies  FELTON, AMA, ASMA negative  BATISTA fibrosure with marked steatosis, borderline BATISTA but no significant fibrosis  1  Reflux gastritis    2  Epigastric abdominal pain    3  Lymphadenopathy, abdominal    4  Lymphoma, unspecified body region, unspecified lymphoma type ([address] )    5  High serum haptoglobin    6  Elevated LFTs    7  Diarrhea, unspecified type    8  Fatty (change of) liver, not elsewhere classified    9  Abnormal CT of the chest        Orders Placed This Encounter   Procedures    Ambulatory Referral to Hepatology     Await biopsy results  MRI of the abdomen  He will discuss with his hematologist and follow-up with hematologist  F/u with hepatology regarding elevated LFTs  PPI twice daily  Pepcid at bedtime  Carafate 4 times a day }(he has been using it 2 times a day)     Try to minimize Motrin if possible  Gas-x  Things that can help improved reflux include: avoidance of trigger foods (potential foods include coffee, caffeine, chocolate, mint, tomato-based products, spicy foods, fatty foods), avoid tight fitting clothing, elevated head of bed 30 degrees, avoid eating 2-3 hours prior to bedtime, weight loss, avoid alcohol, avoid tobacco use  Avoid alcohol  Weight loss via diet and exercise  Follow-up with weight management  ______________________________________________________________________    SUBJECTIVE:    Maricarmen Mujica is a 73 y o  male who presents with complaint of epigastric pain  The medication is helping  When he did not take them (for the procedure the next day) he had a rough night  Motrin helps the most  He is still having this sensation of an upset stomach  Last night 90% was gone and today 80% gone  He spoke with his oncologist and he will set something up  Appetite is low but he does eat  No regurgitation, burning behind chest  + indigestion with burping and upset stomach  No dysphagia, odynophagia, nausea, vomiting  He had diarrhea for sometime but it is improved  BRBPR, melena  Answers for HPI/ROS submitted by the patient on 8/16/2022  Chronicity: new  Onset: 1 to 4 weeks ago  Onset quality: sudden  Frequency: constantly  Episode duration: 4 weeks  Progression since onset: unchanged  Pain location: epigastric region  Pain - numeric: 7/10  Pain quality: aching, dull  Radiates to: does not radiate  anorexia: Yes  arthralgias: No  belching: Yes  constipation: No  diarrhea: No  dysuria: No  fever: No  flatus: No  frequency: No  headaches: No  hematochezia: No  hematuria: No  melena: No  myalgias: No  nausea: No  weight loss: Yes  vomiting: No  Aggravated by: nothing  Relieved by: palpation, sitting up  Diagnostic workup: CT scan, upper endoscopy        REVIEW OF SYSTEMS IS OTHERWISE NEGATIVE    10 point ROS reviewed and negative, except as above      Historical Information Past Medical History:   Diagnosis Date    Chronic kidney disease     Diabetes mellitus ([facility] [address] )     High cholesterol     Hypertension      Past Surgical History:   Procedure Laterality Date    BUNIONECTOMY Right 11/24/2020    Procedure: RIGHT HAV CORRECTION,;  Surgeon: Nasima Miller DPM;  [facility];  Service: Podiatry    INCISION AND DRAINAGE OF WOUND Right 10/5/2016    Procedure: INCISION AND DRAINAGE (I&D) EXTREMITY;  Surgeon: Nasima Miller DPM;  [facility];  Service:     IR BIOPSY LYMPH NODE  7/8/2021    IR EMBOLIZATION (SPECIFY VESSEL OR SITE)  7/8/2021    PILONIDAL CYST EXCISION      WI REPAIR OF HAMMERTOE,ONE Right 2/19/2019    Procedure: THIRD HAMMER TOE CORRECTION;  Surgeon: Nasima Miller DPM;  [facility];  Service: Podiatry    TOE OSTEOTOMY Right 3/14/2017    Procedure: HAMMERTOE CORRECTION R 2 ;  Surgeon: Nasima Miller DPM;  [facility];  Service:     TOE OSTEOTOMY Left 11/24/2020    Procedure: LEFT HT CORRECTION TOE;  Surgeon: Nasima Miller DPM;  [facility];  Service: Podiatry   [address] History   Social History     Substance and Sexual Activity   Alcohol Use No     Social History     Substance and Sexual Activity   Drug Use No     Social History     Tobacco Use   Smoking Status Never Smoker   Smokeless Tobacco Never Used   Tobacco Comment    Never smoked but exposed to second hand smoke from birth until 19's     Family History   Problem Relation Age of Onset    Cancer Father         Leukemia       Meds/Allergies       Current Outpatient Medications:     allopurinol (ZYLOPRIM) 100 mg tablet    amLODIPine (NORVASC) 10 mg tablet    aspirin 81 mg chewable tablet    famotidine (PEPCID) 40 MG tablet    fluticasone (FLONASE) 50 mcg/act nasal spray    hydrochlorothiazide (HYDRODIURIL) 25 mg tablet    losartan (COZAAR) 100 MG tablet    metFORMIN (GLUCOPHAGE) 500 mg tablet    metoprolol succinate (TOPROL-XL) 50 mg 24 hr tablet    Multiple Vitamin (multivitamin) tablet    pantoprazole (PROTONIX) 40 mg tablet    simvastatin (ZOCOR) 40 mg tablet    sucralfate (CARAFATE) 1 g/10 mL suspension    Multiple Vitamins-Minerals (Centrum Silver 50+Men) TABS    predniSONE 20 mg tablet  No current facility-administered medications for this visit  Facility-Administered Medications Ordered in Other Visits:     lactated ringers infusion, 100 mL/hr, Intravenous, Continuous, Stopped at 08/15/22 1121    Allergies   Allergen Reactions    Lisinopril Cough           Objective     Blood pressure 114/71, pulse 73, temperature (!) 96 8 °F (36 °C), temperature source Tympanic, height 6' 3" (1 905 m), weight (!) 138 kg (303 lb 3 2 oz), SpO2 96 %  Body mass index is 37 9 kg/m²  PHYSICAL EXAMINATION:    General Appearance:   Alert, cooperative, no distress   HEENT:  Normocephalic, atraumatic, anicteric  Neck supple, symmetrical, trachea midline  Lungs:   Equal chest rise and unlabored breathing, normal effort, no coughing  Cardiovascular:   No visualized JVD  Abdomen:   No abdominal distension but protuberant   Skin:   No jaundice, rashes, or lesions  Musculoskeletal:   Normal range of motion visualized  Psych:  Normal affect and normal insight  Neuro:  Alert and appropriate  Lab Results:   No visits with results within 1 Day(s) from this visit     Latest known visit with results is:   Hospital Outpatient Visit on 08/15/2022   Component Date Value    POC Glucose 08/15/2022 143 (A)       Lab Results   Component Value Date    WBC 10 66 (H) 08/06/2022    HGB 11 3 (L) 08/06/2022    HCT 37 0 08/06/2022    MCV 85 08/06/2022     08/06/2022       Lab Results   Component Value Date     10/06/2015    SODIUM 136 08/06/2022    K 4 5 08/06/2022    CL 96 08/06/2022    CO2 31 08/06/2022    ANIONGAP 7 10/06/2015    AGAP 9 08/06/2022    BUN 19 08/06/2022    CREATININE 1 28 08/06/2022    GLUC 153 (H) 08/10/2022    GLUF 142 (H) 05/12/2022    CALCIUM 10 0 08/06/2022    AST 35 08/10/2022    AST 40 08/10/2022    ALT 79 (H) 08/10/2022    ALT 70 (H) 08/10/2022    ALKPHOS 105 08/10/2022    PROT 6 8 10/06/2015    TP 8 2 08/10/2022    BILITOT 0 44 10/06/2015    TBILI 0 31 08/10/2022    EGFR 58 08/06/2022       Lab Results   Component Value Date    CRP >90 0 (H) 10/06/2015       No results found for: [id number], TSH    Lab Results   Component Value Date    IRON 36 (L) 08/10/2022    TIBC 355 08/10/2021    FERRITIN 1,192 (H) 08/10/2022       Radiology Results:   EGD    Result Date: 8/15/2022  Narrative: [facility] [address] [place] [id number] [phone] DATE OF SERVICE: 8/15/22 PHYSICIAN(S): Attending: Lila Marroquin DO Fellow: No Staff Documented INDICATION: Epigastric abdominal pain POST-OP DIAGNOSIS: See the impression below  PREPROCEDURE: Informed consent was obtained for the procedure, including sedation  Risks of perforation, hemorrhage, adverse drug reaction and aspiration were discussed  The patient was placed in the left lateral decubitus position  Patient was explained about the risks and benefits of the procedure  Risks including but not limited to bleeding, infection, and perforation were explained in detail  Also explained about less than 100% sensitivity with the exam and other alternatives  DETAILS OF PROCEDURE: Patient was taken to the procedure room where a time out was performed to confirm correct patient and correct procedure  The patient underwent monitored anesthesia care, which was administered by an anesthesia professional  The patient's blood pressure, heart rate, level of consciousness, respirations and oxygen were monitored throughout the procedure  The scope was introduced through the mouth and advanced to the second part of the duodenum  Retroflexion was performed in the fundus  Prior to the procedure, the patient's H  Pylori status was negative   The patient experienced no blood loss  The procedure was not difficult  The patient tolerated the procedure well  There were no apparent complications  ANESTHESIA INFORMATION: ASA: ASA status not filed in the log  Anesthesia Type: Anesthesia type not filed in the log  MEDICATIONS: Lidocaine Viscous HCl (XYLOCAINE) 2 % mucosal solution 15 mL (Totals for administrations occurring from 1028 to 1047 on 08/15/22) FINDINGS: The duodenal bulb and 2nd part of the duodenum appeared normal  Moderate, patchy erythematous mucosa in the antrum; performed cold forceps biopsy to rule out H  pylori  (additional information:  There was moderate striped erythema in the antrum  Biopsy obtained to rule out H pylori and to rule out GAVE  In the gastric body and proximal antrum there is a mosaic pattern to the mucosa with a similar appearance to portal hypertensive gastropathy  Retroflexion revealed a normal fundus  Retroflexion revealed a small hiatal hernia  Moderate amount of bilious fluid noted in the gastric body which was removed  Irregular Z-line 39 cm from the incisors; performed cold forceps biopsy  (additional information there was a mildly irregular Z-line at 39 cm  The salmon-colored mucosa extended less than 1 cm however given prior biopsies suggesting intestinal metaplasia this was biopsied  Grade A esophagitis with mucosal breaks measuring less than 5 mm not continuous between folds, covering less than 75% of the circumference Mild, localized nodular mucosa in the GE junction; performed cold forceps biopsy  (Additional information: At the [address] [place] [address] & [address] junction/squamocolumnar junction there was small soft area of nodularity  This was biopsied    Remainder of esophagus appeared normal  SPECIMENS: ID Type Source Tests Collected by Time Destination 1 : linear antral errthema r/o h pylori r/o gaves Tissue Stomach TISSUE EXAM 201 [facility] 8/15/2022 10:40 AM  2 : irregular z line 39 previous bx suggested barretts Tissue Esophagus TISSUE EXAM Marva Delarosa, DO 8/15/2022 10:42 AM  3 : small nodule ge junction Tissue Esophagogastric junction TISSUE EXAM 201 [facility], DO 8/15/2022 10:43 AM      Impression: Normal duodenum to 2nd portion Moderate striped erythema in the antrum status post biopsy rule out H pylori rule out gastric antral vascular ectasias Small proximal 1 cm sliding hiatal hernia Mildly irregular Z-line at 39 cm status post biopsy Small area fine nodularity at the GE junction/39 cm status post biopsy Source for pain not identified EGD RECOMMENDATION: Await pathology results I am suspicious that year right-sided chest wall pain may be more related to musculoskeletal etiology or related to the adenopathy noted on the CT scan  I agree with Dr Tejas Pozo, please follow-up with Hematology-Oncology  Follow-up with Dr Tejas Pozo in the office in the near future  Continue Pepcid 2 hours before bedtime  Continue Carafate but I would take this 1 g before 2 year largest meals daily and at bedtime Continue the proton pump inhibitor, -Please call the Gastroenterology office at [phone] for biopsy results if you do  not hear from our office in 14 days  -Follow-up with primary care doctor as previously scheduled -Please call gastroenterology physician on call or go to the emergency department if you develop abdominal pain, rectal bleeding greater than 1 tbsp, black stools, fever greater than 100 5, persistent nausea or vomiting  Gastroenterology office phone number is [phone]  Marva Sheffield [place], DO     CT abdomen pelvis wo contrast    Result Date: 8/6/2022  Narrative: CT ABDOMEN AND PELVIS WITHOUT IV CONTRAST INDICATION:   Epigastric pain Epigastric pain and tenderness  COMPARISON:  5/12/2022  TECHNIQUE:  CT examination of the abdomen and pelvis was performed without intravenous contrast  Axial, sagittal, and coronal 2D reformatted images were created from the source data and submitted for interpretation   Radiation dose length product (DLP) for this visit:  1580 mGy-cm   This examination, like all CT scans performed in the [facility], was performed utilizing techniques to minimize radiation dose exposure, including the use of iterative reconstruction and automated exposure control  Enteric contrast was not administered  FINDINGS: ABDOMEN LOWER CHEST:  No clinically significant abnormality identified in the visualized lower chest  LIVER/BILIARY TREE:  Liver is diffusely decreased in density consistent with fatty change  No CT evidence of suspicious hepatic mass  Normal hepatic contours  No biliary dilatation  GALLBLADDER:  There are gallstone(s) within the gallbladder, without pericholecystic inflammatory changes  SPLEEN:  8 6 cm low-attenuation focus within the spleen which is not present on 5/12/2022  [address] PANCREAS:  Unremarkable  ADRENAL GLANDS:  Unremarkable  KIDNEYS/URETERS:  Punctate nonobstructing calculus at the left inferior pole  No hydronephrosis  STOMACH AND BOWEL:  Unremarkable  APPENDIX:  No findings to suggest appendicitis  ABDOMINOPELVIC CAVITY:  No ascites  No pneumoperitoneum  Extensive upper abdominal, mesenteric and retroperitoneal adenopathy    A mesenteric lymph node conglomerate measuring up to 12 2 cm (series 2, image 52) in largest dimension previously measured 11 4 cm  A 2 3 cm celiac axis lymph node was not present previously  A 3 6 cm gastrohepatic lymph node was not present previously  Numerous retroperitoneal lymph nodes are stable, for instance a 2 1 cm aortocaval lymph node (series 2, image 42) presumed measured 2 3 cm    VESSELS:  Unremarkable for patient's age  PELVIS REPRODUCTIVE ORGANS:  Unremarkable for patient's age  URINARY BLADDER:  Unremarkable  ABDOMINAL WALL/INGUINAL REGIONS:  2 6 cm cystic focus immediately deep to the umbilicus  OSSEOUS STRUCTURES:  No acute fracture or destructive osseous lesion       Impression: 8 6 cm low-attenuation focus within the spleen which is not present on CT examination of 5/12/2022  Differential considerations include splenic abscess or progression of patient's follicular lymphoma  This finding can be further evaluated with contrast-enhanced MR abdomen  Progression of upper abdominal lymphadenopathy  Mesenteric and retroperitoneal lymph nodes kennedy burden appears similar to 5/12/2022  New 2 6 cm cystic focus immediately deep to the umbilicus likely to represent additional disease spread    Workstation performed: [id number]

## 2022-08-17 NOTE — PATIENT INSTRUCTIONS
Today we discussed:  Await biopsy results  Follow-up with hematologist/oncologist regarding recent CT scan, and ordering the MRI of the abdomen  Pantoprazole twice daily  Pepcid at bedtime  Carafate 4 times a day (he has been using it 2 times a day)  Try to minimize Motrin if possible  Gas-x as needed to help with gas  Things that can help improved reflux include: avoidance of trigger foods (potential foods include coffee, caffeine, chocolate, mint, tomato-based products, spicy foods, fatty foods), avoid tight fitting clothing, elevated head of bed 30 degrees, avoid eating 2-3 hours prior to bedtime, weight loss, avoid alcohol, avoid tobacco use  Avoid alcohol  Weight loss via diet and exercise  Follow-up with weight management  Follow-up with hepatologist      Gave referral , 3 month follow up appointment made   Made 3 month  follow up appointment made

## 2022-08-18 ENCOUNTER — TELEPHONE (OUTPATIENT)
Dept: HEMATOLOGY ONCOLOGY | Facility: CLINIC | Age: 67
End: 2022-08-18

## 2022-08-18 LAB — CALPROTECTIN STL-MCNT: 173 UG/G (ref 0–120)

## 2022-08-18 NOTE — RESULT ENCOUNTER NOTE
I personally informed patient that biopsies of the stomach were benign and negative for a bacteria called H pylori  Biopsy of the regular Z-line was unremarkable  Biopsy of area of nodularity at the GE junction did suggest the possibility of intestinal metaplasia and possibly short-segment Ozuna's esophagus  There are no angry cells or dysplasia identified  Please recall for repeat EGD in 3 years  Did see Dr Kaylee Alford yesterday and has a follow-up with him in November    Thank you

## 2022-08-18 NOTE — PROGRESS NOTES
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     Hem/Onc Problem List:     1  Follicular lymphoma of intra-abdominal lymph nodes, unspecified follicular lymphoma type [facility])    Chief Complaint:    Discussion of treatment for follicular lymphoma     Assessment/Plan:   1  Follicular Lymphoma, WHO grade 1-2, initially diagnosed 07/8/2021  Patient was on observation and had no constitutional symptoms  Recently, he developed complaint of chest pain likely related to epigastric pain with burning sensation  He then began having discomfort in the abdomen in other areas  Also began having diarrhea  He had extensive workup from GI which was unrevealing  GI physician spoke with my attending who believes symptoms could be related to his follicular lymphoma  Due to this, treatment is warranted at this time  My attending and I already discussed this patient  Patient will be started on bendamustine, Rituxan  Details are as below under plan/labs  · Discussion of decision making    I personally reviewed the following lab results, the image studies, pathology, other specialty/physicians consult notes and recommendations, and outside medical records from [address] [place] [address]  I had a lengthy discussion with the patient and shared the work-up findings  I spent 40 minutes reviewing the records (labs, clinician notes, outside records, medical history, ordering medicine/tests/procedures, interpreting the imaging/labs previously done) and coordination of care as well as direct time with the patient today, of which greater than 50% of the time was spent in counseling and coordination of care with the patient/family      · Plan/Labs  · I discussed treatment plan in detail with patient and his wife  I provided print outs from [facility] with detailed side effect profile of below treatment  Patient and wife understands side effects and signed consent for treatment as they would like to proceed  Patient also signed blood consent form  · Patient to have port placed by IR prior to start of treatment  · Patient will have a CBC, CMP, hepatitis panel prior to start of treatment  · Patient knows to have lab work no earlier than 1 week prior to each treatment cycle  · Treatment plan:  · Bendamustine 90mg/m2 D1, D2 + Rituxan 375mg/m2 D1 Q28 days  · Supportive therapies:   · Allopurinol 300mg once daily   · Zofran 8mg Q8 hours PRN for N/V  · EMLA cream 30 minutes prior to port access  · ECOG 0    Follow Up:  1 week after 1st treatment    All questions were answered to the patient's satisfaction during this encounter  The patient knows the contact information for our office and knows to reach out for any relevant concerns related to this encounter  They are to call for any temperature 100 4 or higher, new symptoms including but not restricted to shaking chills, decreased appetite, nausea, vomiting, diarrhea, increased fatigue, shortness of breath or chest pain, confusion, and not feeling the strength to come to the clinic  For all other listed problems and medical diagnosis in their chart - they are managed by PCP and/or other specialists, which the patient acknowledges  Thank you very much for your consultation and making us a part of this patient's care  We are continuing to follow closely with you  Please do not hesitate to reach out to me with any additional questions or concerns  AJCC 8th Edition Cancer Stage :      Cancer Staging  No matching staging information was found for the patient      Hematology/Oncology History:   - incidentally diagnosed while doing imaging for aortic aneurysm, showed retroperitoneum and mesenteric lymphadenopathy, largest lymph node about 3 cm, status post biopsy showed follicular lymphoma; grade 1-2;   - 7/2021 :  Developed a big hematoma post biopsy   Hospitalized   Hemoglobin dropped to 7 8 then stable  -  8/2021 : Hb recovered  PET showed moderate tumor burden  Does not fit GELF criteria for treatment; FLIPI score = 2, age +  Stage 3  PET CT showed low SUV  Decided to observe  - Feb 28, 2022 US Head and neck:Patient's lump in the right submandibular region corresponds to an enlarged, heterogeneous submandibular lymph node measuring 1 8 x 1 1 x 1 9 cm (image 18 ) Review of the prior PET/CT from 8/10/2021 demonstrate an enlarged, hypermetabolic node in this area, therefore this is likely part of the patient's known follicular lymphoma  Difficult to assess interval change because of the difference in imaging modality  - May 12, 2022, CT CAP w/o c with interval increase in mesenteric lymph nodes, new pulmonary nodules  Conglomerate kennedy mass at the root of the small bowel mesentery on image 82 of series 2 measures approximately 12 0 x 7 5 cm, increased from 8 9 x 6 3 cm when measured using similar technique on July 10, 2021   Discrete upper retroperitoneal nodes are slightly increased from July 10, 2021, specifically an aortocaval node measuring 2 9 x 2 3 cm on image 74 of series 2, increased from 2 2 x 2 0 cm on prior exam and an anterior left periaortic node measuring 2 4 x 2 0 cm on image 76 of series 2 increased from 2 1 x 1 7 cm on prior examination    · August 6, 2022, CT abd, pelvis w/o contrast:  · 8 6 cm low-attenuation focus within the spleen which is not present on CT examination of 5/12/2022  Differential considerations include splenic abscess or progression of patient's follicular lymphoma  This finding can be further evaluated with contrast-enhanced MR abdomen  · Progression of upper abdominal lymphadenopathy  Mesenteric and retroperitoneal lymph nodes kennedy burden appears similar to 5/12/2022    · New 2 6 cm cystic focus immediately deep to the umbilicus likely to represent additional disease spread  · August 8, 2022, patient had an EGD showing erythema in antrum  Small proximal 1 cm sliding hiatal hernia  Otherwise unrevealing findings  Biopsies were benign  History of Present Illiness:   Malinda Stafford is a 73 y o  male with the above-noted HemOnc history who is here  to follow-up regarding history of follicular lymphoma  Patient has been on having ongoing GI complaints  Patient has history of GERD, chest pain likely related to GERD  He also has been having decreased appetite causing some weight loss  He still has sensation of upset stomach  GI physicians spoke with my attending who believes his symptoms could be related to his follicular lymphoma  Patient came in to discuss treatment which was already pre discussed briefly over the phone by my attending  No fever or chills  No exertional chest pain, diaphoresis or shortness  of breath  No cough and phlegm, no hemoptysis  Patient denied nausea  and vomiting  +abdominal pain, +GERD, + weight loss  No  symptoms  No headache or blurred vision  No seizure activity  Appetite good  No significant weight gain  The patient denies bleeding anywhere  ROS: A 12-point of review of systems is obtained and other than the above is noncontributory  Objective:   VITALS:   /70   Pulse 76   Temp 98 1 °F (36 7 °C)   Resp 18   Ht 6' 3" (1 905 m)   Wt (!) 137 kg (303 lb)   SpO2 98%   BMI 37 87 kg/m²     Physical EXAM:  General:  Alert, cooperative, no distress, appears stated age  Head:  Normocephalic, without obvious abnormality, atraumatic  Eyes:  Conjunctivae/corneas clear  PERRL, EOMs intact  No evidence of conjunctivitis     Throat: Lips, mucosa, and tongue normal   No bleeding from mouth     Neck: Supple, symmetrical, trachea midline    Lungs:    Respiratory effort easy, nonlabored    Heart:  Regular rate and rhythm, S1, S2 normal    Abdomen:   Soft, non-tender,nondistended  No masses,  No organomegaly  Extremities:  Lymphatics: Extremities normal, atraumatic, no cyanosis or edema  No cervical, axillary or inguinal adenopathy   Skin: Skin color, texture, turgor normal  No rashes  Neurologic: A&Ox4   No focal neuro deficits       Allergies   Allergen Reactions    Lisinopril Cough       Past Medical History:   Diagnosis Date    Chronic kidney disease     Diabetes mellitus ([address] )     High cholesterol     Hypertension        Past Surgical History:   Procedure Laterality Date    BUNIONECTOMY Right 11/24/2020    Procedure: RIGHT HAV CORRECTION,;  Surgeon: Nargis Monroy DPM;  [facility];  Service: Podiatry    INCISION AND DRAINAGE OF WOUND Right 10/5/2016    Procedure: INCISION AND DRAINAGE (I&D) EXTREMITY;  Surgeon: Nargis Monroy DPM;  [facility];  Service:     IR BIOPSY LYMPH NODE  7/8/2021    IR EMBOLIZATION (SPECIFY VESSEL OR SITE)  7/8/2021    PILONIDAL CYST EXCISION      NE REPAIR OF HAMMERTOE,ONE Right 2/19/2019    Procedure: THIRD HAMMER TOE CORRECTION;  Surgeon: Nargis Monroy DPM;  [facility];  Service: Podiatry    TOE OSTEOTOMY Right 3/14/2017    Procedure: HAMMERTOE CORRECTION R 2 ;  Surgeon: Nargis Monroy DPM;  [facility];  Service:     TOE OSTEOTOMY Left 11/24/2020    Procedure: LEFT HT CORRECTION TOE;  Surgeon: Nargis Monroy DPM;  [facility];  Service: Podiatry   [address]        Family History   Problem Relation Age of Onset    Cancer Father         Leukemia       Social History     Socioeconomic History    Marital status: /Civil Union     Spouse name: Not on file    Number of children: Not on file    Years of education: Not on file    Highest education level: Not on file   Occupational History    Not on file   Tobacco Use    Smoking status: Never Smoker    Smokeless tobacco: Never Used    Tobacco comment: Never smoked but exposed to second hand smoke from birth until 1980's   Vaping Use    Vaping Use: Never used   Substance and Sexual Activity    Alcohol use: No    Drug use: No    Sexual activity: Not Currently     Partners: Female     Birth control/protection: Abstinence   Other Topics Concern    Not on file   Social History Narrative    Not on file     Social Determinants of Health     Financial Resource Strain: Not on file   Food Insecurity: Not on file   Transportation Needs: Not on file   Physical Activity: Not on file   Stress: Not on file   Social Connections: Not on file   Intimate Partner Violence: Not on file   Housing Stability: Not on file       Current Outpatient Medications   Medication Sig Dispense Refill    allopurinol (ZYLOPRIM) 100 mg tablet Take 2 tablets (200 mg total) by mouth daily 30 tablet 0    amLODIPine (NORVASC) 10 mg tablet take 1 tablet by mouth once daily AT NOON      aspirin 81 mg chewable tablet Chew 81 mg daily      famotidine (PEPCID) 40 MG tablet Take 1 tablet (40 mg total) by mouth daily at bedtime 30 tablet 3    fluticasone (FLONASE) 50 mcg/act nasal spray instill 2 sprays into each nostril one to two times a day      hydrochlorothiazide (HYDRODIURIL) 25 mg tablet Take 25 mg by mouth daily      lidocaine-prilocaine (EMLA) cream Apply to port area 30 minutes prior to access 30 g 0    losartan (COZAAR) 100 MG tablet Take 0 5 tablets (50 mg total) by mouth daily (Patient taking differently: Take 100 mg by mouth daily)      metFORMIN (GLUCOPHAGE) 500 mg tablet Take 1,000 mg by mouth 2 (two) times a day with meals      metoprolol succinate (TOPROL-XL) 50 mg 24 hr tablet Take 50 mg by mouth every evening      Multiple Vitamin (multivitamin) tablet Take 1 tablet by mouth daily      ondansetron (ZOFRAN) 8 mg tablet Take 1 tablet (8 mg total) by mouth every 8 (eight) hours as needed for nausea or vomiting 30 tablet 2    pantoprazole (PROTONIX) 40 mg tablet Take 1 tablet (40 mg total) by mouth 2 (two) times a day before meals 60 tablet 3    simvastatin (ZOCOR) 40 mg tablet Take 40 mg by mouth daily at bedtime      sucralfate (CARAFATE) 1 g/10 mL suspension Take 10 mL (1 g total) by mouth 4 (four) times a day as needed (abdominal pain) 1200 mL 1    Multiple Vitamins-Minerals (Centrum Silver 50+Men) TABS  (Patient not taking: No sig reported)      predniSONE 20 mg tablet Take by mouth daily (Patient not taking: No sig reported)       No current facility-administered medications for this visit  (Not in a hospital admission)      DATA REVIEW:    Pathology Result:    Final Diagnosis   Date Value Ref Range Status   08/15/2022   Final    A  Stomach, linear antral erythema:  - Gastric antral mucosa with mild chronic active gastritis and regenerative epithelial changes   - No Helicobacter pylori organisms are identified with immunoperoxidase stain   - Negative for intestinal metaplasia, dysplasia or carcinoma  B  Esophagus, irregular z line 39:  - Squamocolumnar junction mucosa with mild chronic inflammation and regenerative epithelial changes   - No intestinal metaplasia/ dysplasia identified  C  Esophagogastric junction, small nodule ge junction:  - Fragments of esophageal squamous and gastric glandular mucosa with intestinal metaplasia and regenerative epithelial changes  - Intestinal metaplasia identified with Alcian blue/PAS stain   - Pan keratin stain highlights benign epithelial elements   - No dysplasia identified  See note  Note (C): This biopsy shows gastric-type mucosa with scattered goblet cells  The diagnosis in this case depends on the location of this biopsy  If this biopsy was taken from the tubular esophagus at least 1 cm above the gastric folds, it represents Ozuna mucosa of the distinctive type  If this biopsy was taken from the gastric cardia, it represents intestinal metaplasia of the gastric cardia  Reference: Fernandez ODOM; [employer] [facility] Gastroenter[facility]    AGC Clinical Guideline: Diagnosis and Management of Ozuna's Esophagus  Juliana Kaye  2016 Jan;[phone]      07/08/2021   Final    A  Lymph node, periaortic, biopsy:  -  Follicular lymphoma, WHO grade 1-2 (of 3) (see note)       06/28/2021   Final    A  Duodenum:  - Small bowel mucosa with no significant histopathologic abnormality   - Negative for malabsorption pattern  - Negative for malignancy  B  Stomach:  - Gastric mucosa with no significant histopathologic abnormality   - Negative for H  pylori by routine H&E   - Negative for malignancy  C  Esophagus, distal:  - Squamocolumnar mucosa with intestinal metaplasia  See comment   - Negative for dysplasia and malignancy  Comment: The diagnosis in this case depends on the location of this biopsy  If this biopsy was taken from the tubular esophagus at least 1 cm above the gastric folds, it represents Ozuna mucosa  If this biopsy was taken from the gastric cardia, it represents intestinal metaplasia of the gastric cardia  Image Results: They are reviewed and documented in Hematology/Oncology history    MRI lumbar spine wo contrast  Narrative: MRI LUMBAR SPINE WITHOUT CONTRAST    INDICATION: [id number]: Sciatica, right side  COMPARISON:  None  TECHNIQUE:  Sagittal T1, sagittal T2, sagittal inversion recovery, axial T1 and axial T2, coronal T2     IMAGE QUALITY:  Diagnostic    FINDINGS:    VERTEBRAL BODIES:  There are 5 lumbar type vertebral bodies  Normal alignment of the lumbar spine  No spondylolysis or spondylolisthesis  No scoliosis  No compression fracture  Normal marrow signal is identified within the visualized bony   structures  No discrete marrow lesion  SACRUM:  Normal signal within the sacrum  No evidence of insufficiency or stress fracture  DISTAL CORD AND CONUS:  Normal size and signal within the distal cord and conus  Conus medullaris terminates at L1-2      PARASPINAL SOFT TISSUES:  Paraspinal soft tissues are unremarkable  LOWER THORACIC DISC SPACES:  Normal disc height and signal   No disc herniation, canal stenosis or foraminal narrowing  LUMBAR DISC SPACES:    L1-L2:  There is disc space degeneration and narrowing  There is a minimal bulge  There is facet arthrosis  There is no significant canal stenosis or foraminal narrowing  L2-L3:  There is a bulging annulus  There is facet arthrosis with ligament flavum thickening  There is mild mass effect on the thecal sac  L3-L4:  There is disc space degeneration  There is a bulging annulus  There is facet arthrosis with ligamentum flavum thickening  There is mild to moderate bilateral foraminal narrowing  L4-L5:  There is disc space degeneration and narrowing  There is a bulging annulus  There is facet arthrosis with ligamentum flavum thickening  There is moderate mass effect on the thecal sac  There is severe right foraminal narrowing with   impingement of the exiting nerve root  There is moderate left foraminal narrowing  L5-S1:  Patient appears to be status post prior keyhole laminectomy on the left at this level  There is facet arthrosis  There is no significant canal stenosis or foraminal narrowing  Impression: Degenerative changes of the lumbar spine, as described above  Most notable level is at L4-L5 where multifactorial disease results in moderate mass effect on the thecal sac  Severe right foraminal narrowing results in impingement of the exiting L4 nerve root at this level  Workstation performed: [id number]        LABS:  Lab data are reviewed and documented in HemOnc history       Recent Results (from the past 48 hour(s))   Uric acid    Collection Time: 08/19/22  2:31 PM   Result Value Ref Range    Uric Acid 7 4 3 5 - 8 5 mg/dL   CBC and differential    Collection Time: 08/19/22  2:31 PM   Result Value Ref Range    WBC 11 25 (H) 4 31 - 10 16 Thousand/uL    RBC 4 13 3 88 - 5 62 Million/uL    Hemoglobin 10 6 (L) 12 0 - 17 0 g/dL    Hematocrit 35 1 (L) 36 5 - 49 3 %    MCV 85 82 - 98 fL    MCH 25 7 (L) 26 8 - 34 3 pg    MCHC 30 2 (L) 31 4 - 37 4 g/dL    RDW 14 6 11 6 - 15 1 %    MPV 10 7 8 9 - 12 7 fL    Platelets 262 885 - 140 Thousands/uL    nRBC 0 /100 WBCs    Neutrophils Relative 81 (H) 43 - 75 %    Immat GRANS % 1 0 - 2 %    Lymphocytes Relative 5 (L) 14 - 44 %    Monocytes Relative 8 4 - 12 %    Eosinophils Relative 4 0 - 6 %    Basophils Relative 1 0 - 1 %    Neutrophils Absolute 9 24 (H) 1 85 - 7 62 Thousands/µL    Immature Grans Absolute 0 06 0 00 - 0 20 Thousand/uL    Lymphocytes Absolute 0 56 (L) 0 60 - 4 47 Thousands/µL    Monocytes Absolute 0 84 0 17 - 1 22 Thousand/µL    Eosinophils Absolute 0 48 0 00 - 0 61 Thousand/µL    Basophils Absolute 0 07 0 00 - 0 10 Thousands/µL   Comprehensive metabolic panel    Collection Time: 08/19/22  2:31 PM   Result Value Ref Range    Sodium 142 135 - 147 mmol/L    Potassium 4 7 3 5 - 5 3 mmol/L    Chloride 102 96 - 108 mmol/L    CO2 29 21 - 32 mmol/L    ANION GAP 11 4 - 13 mmol/L    BUN 22 5 - 25 mg/dL    Creatinine 1 19 0 60 - 1 30 mg/dL    Glucose, Fasting 128 (H) 65 - 99 mg/dL    Calcium 10 2 (H) 8 3 - 10 1 mg/dL    Corrected Calcium 10 9 (H) 8 3 - 10 1 mg/dL    AST 75 (H) 5 - 45 U/L     (H) 12 - 78 U/L    Alkaline Phosphatase 107 46 - 116 U/L    Total Protein 8 1 6 4 - 8 4 g/dL    Albumin 3 1 (L) 3 5 - 5 0 g/dL    Total Bilirubin 0 28 0 20 - 1 00 mg/dL    eGFR 63 ml/min/1 73sq m   LD,Blood    Collection Time: 08/19/22  2:31 PM   Result Value Ref Range     (H) 81 - 234 U/L             Yessy Franco PA-C  8/19/2022, 3:28 PM

## 2022-08-19 ENCOUNTER — OFFICE VISIT (OUTPATIENT)
Dept: HEMATOLOGY ONCOLOGY | Facility: CLINIC | Age: 67
End: 2022-08-19
Payer: MEDICARE

## 2022-08-19 ENCOUNTER — TELEPHONE (OUTPATIENT)
Dept: HEMATOLOGY ONCOLOGY | Facility: CLINIC | Age: 67
End: 2022-08-19

## 2022-08-19 ENCOUNTER — APPOINTMENT (OUTPATIENT)
Dept: LAB | Facility: HOSPITAL | Age: 67
End: 2022-08-19
Payer: MEDICARE

## 2022-08-19 VITALS
DIASTOLIC BLOOD PRESSURE: 70 MMHG | SYSTOLIC BLOOD PRESSURE: 112 MMHG | WEIGHT: 303 LBS | BODY MASS INDEX: 37.67 KG/M2 | OXYGEN SATURATION: 98 % | TEMPERATURE: 98.1 F | HEART RATE: 76 BPM | RESPIRATION RATE: 18 BRPM | HEIGHT: 75 IN

## 2022-08-19 DIAGNOSIS — C82.90 FOLLICULAR LYMPHOMA, UNSPECIFIED FOLLICULAR LYMPHOMA TYPE, UNSPECIFIED BODY REGION (HCC): ICD-10-CM

## 2022-08-19 DIAGNOSIS — C82.08 FOLLICULAR LYMPHOMA GRADE I OF LYMPH NODES OF MULTIPLE SITES (HCC): ICD-10-CM

## 2022-08-19 DIAGNOSIS — Z11.59 ENCOUNTER FOR SCREENING FOR OTHER VIRAL DISEASES: ICD-10-CM

## 2022-08-19 DIAGNOSIS — C48.1: ICD-10-CM

## 2022-08-19 DIAGNOSIS — Z95.828 PORT-A-CATH IN PLACE: ICD-10-CM

## 2022-08-19 DIAGNOSIS — M10.9 GOUT: ICD-10-CM

## 2022-08-19 DIAGNOSIS — C48.1: Primary | ICD-10-CM

## 2022-08-19 LAB
ALBUMIN SERPL BCP-MCNC: 3.1 G/DL (ref 3.5–5)
ALP SERPL-CCNC: 107 U/L (ref 46–116)
ALT SERPL W P-5'-P-CCNC: 108 U/L (ref 12–78)
ANION GAP SERPL CALCULATED.3IONS-SCNC: 11 MMOL/L (ref 4–13)
AST SERPL W P-5'-P-CCNC: 75 U/L (ref 5–45)
BASOPHILS # BLD AUTO: 0.07 THOUSANDS/ΜL (ref 0–0.1)
BASOPHILS NFR BLD AUTO: 1 % (ref 0–1)
BILIRUB SERPL-MCNC: 0.28 MG/DL (ref 0.2–1)
BUN SERPL-MCNC: 22 MG/DL (ref 5–25)
CALCIUM ALBUM COR SERPL-MCNC: 10.9 MG/DL (ref 8.3–10.1)
CALCIUM SERPL-MCNC: 10.2 MG/DL (ref 8.3–10.1)
CHLORIDE SERPL-SCNC: 102 MMOL/L (ref 96–108)
CO2 SERPL-SCNC: 29 MMOL/L (ref 21–32)
CREAT SERPL-MCNC: 1.19 MG/DL (ref 0.6–1.3)
ELASTASE PANC STL-MCNT: 229 UG ELAST./G
EOSINOPHIL # BLD AUTO: 0.48 THOUSAND/ΜL (ref 0–0.61)
EOSINOPHIL NFR BLD AUTO: 4 % (ref 0–6)
ERYTHROCYTE [DISTWIDTH] IN BLOOD BY AUTOMATED COUNT: 14.6 % (ref 11.6–15.1)
FAT STL QL: NORMAL
GFR SERPL CREATININE-BSD FRML MDRD: 63 ML/MIN/1.73SQ M
GLUCOSE P FAST SERPL-MCNC: 128 MG/DL (ref 65–99)
HBV CORE AB SER QL: NORMAL
HBV CORE IGM SER QL: NORMAL
HBV SURFACE AG SER QL: NORMAL
HCT VFR BLD AUTO: 35.1 % (ref 36.5–49.3)
HCV AB SER QL: NORMAL
HGB BLD-MCNC: 10.6 G/DL (ref 12–17)
IMM GRANULOCYTES # BLD AUTO: 0.06 THOUSAND/UL (ref 0–0.2)
IMM GRANULOCYTES NFR BLD AUTO: 1 % (ref 0–2)
LDH SERPL-CCNC: 333 U/L (ref 81–234)
LYMPHOCYTES # BLD AUTO: 0.56 THOUSANDS/ΜL (ref 0.6–4.47)
LYMPHOCYTES NFR BLD AUTO: 5 % (ref 14–44)
MCH RBC QN AUTO: 25.7 PG (ref 26.8–34.3)
MCHC RBC AUTO-ENTMCNC: 30.2 G/DL (ref 31.4–37.4)
MCV RBC AUTO: 85 FL (ref 82–98)
MONOCYTES # BLD AUTO: 0.84 THOUSAND/ΜL (ref 0.17–1.22)
MONOCYTES NFR BLD AUTO: 8 % (ref 4–12)
NEUTRAL FAT STL QL: NORMAL
NEUTROPHILS # BLD AUTO: 9.24 THOUSANDS/ΜL (ref 1.85–7.62)
NEUTS SEG NFR BLD AUTO: 81 % (ref 43–75)
NRBC BLD AUTO-RTO: 0 /100 WBCS
PLATELET # BLD AUTO: 252 THOUSANDS/UL (ref 149–390)
PMV BLD AUTO: 10.7 FL (ref 8.9–12.7)
POTASSIUM SERPL-SCNC: 4.7 MMOL/L (ref 3.5–5.3)
PROT SERPL-MCNC: 8.1 G/DL (ref 6.4–8.4)
RBC # BLD AUTO: 4.13 MILLION/UL (ref 3.88–5.62)
SODIUM SERPL-SCNC: 142 MMOL/L (ref 135–147)
URATE SERPL-MCNC: 7.4 MG/DL (ref 3.5–8.5)
WBC # BLD AUTO: 11.25 THOUSAND/UL (ref 4.31–10.16)

## 2022-08-19 PROCEDURE — 80053 COMPREHEN METABOLIC PANEL: CPT

## 2022-08-19 PROCEDURE — 86705 HEP B CORE ANTIBODY IGM: CPT

## 2022-08-19 PROCEDURE — 87340 HEPATITIS B SURFACE AG IA: CPT

## 2022-08-19 PROCEDURE — 86803 HEPATITIS C AB TEST: CPT

## 2022-08-19 PROCEDURE — 36415 COLL VENOUS BLD VENIPUNCTURE: CPT

## 2022-08-19 PROCEDURE — 85025 COMPLETE CBC W/AUTO DIFF WBC: CPT

## 2022-08-19 PROCEDURE — 83615 LACTATE (LD) (LDH) ENZYME: CPT

## 2022-08-19 PROCEDURE — 99215 OFFICE O/P EST HI 40 MIN: CPT | Performed by: INTERNAL MEDICINE

## 2022-08-19 PROCEDURE — 86704 HEP B CORE ANTIBODY TOTAL: CPT

## 2022-08-19 PROCEDURE — 84550 ASSAY OF BLOOD/URIC ACID: CPT

## 2022-08-19 RX ORDER — ONDANSETRON HYDROCHLORIDE 8 MG/1
8 TABLET, FILM COATED ORAL EVERY 8 HOURS PRN
Qty: 30 TABLET | Refills: 2 | Status: SHIPPED | OUTPATIENT
Start: 2022-08-19

## 2022-08-19 RX ORDER — ALLOPURINOL 100 MG/1
300 TABLET ORAL DAILY
Qty: 30 TABLET | Refills: 1 | Status: SHIPPED | OUTPATIENT
Start: 2022-08-19 | End: 2022-09-18

## 2022-08-19 RX ORDER — SODIUM CHLORIDE 9 MG/ML
20 INJECTION, SOLUTION INTRAVENOUS ONCE
Status: CANCELLED | OUTPATIENT
Start: 2022-09-08

## 2022-08-19 RX ORDER — SODIUM CHLORIDE 9 MG/ML
20 INJECTION, SOLUTION INTRAVENOUS ONCE
Status: CANCELLED | OUTPATIENT
Start: 2022-09-06

## 2022-08-19 RX ORDER — ACETAMINOPHEN 325 MG/1
650 TABLET ORAL ONCE
Status: CANCELLED | OUTPATIENT
Start: 2022-10-04

## 2022-08-19 RX ORDER — ACETAMINOPHEN 325 MG/1
650 TABLET ORAL ONCE
Status: CANCELLED | OUTPATIENT
Start: 2022-09-06

## 2022-08-19 RX ORDER — LIDOCAINE AND PRILOCAINE 25; 25 MG/G; MG/G
CREAM TOPICAL
Qty: 30 G | Refills: 0 | Status: SHIPPED | OUTPATIENT
Start: 2022-08-19

## 2022-08-19 RX ORDER — SODIUM CHLORIDE 9 MG/ML
20 INJECTION, SOLUTION INTRAVENOUS ONCE
Status: CANCELLED | OUTPATIENT
Start: 2022-10-04

## 2022-08-19 NOTE — TELEPHONE ENCOUNTER
start BR treatment within 1-2 weeks  - F/U with Dr Kendell Mcmahon 1 week after 1st treatment  If no availability, patient to see omid  on a Wednesday or Thursday     please remind patient to have labs done 2 to 3 days prior to each treatment      While we try to accommodate patient requests, our priority is to schedule treatment according to Doctor's orders and site availability  1  Does the Provider use the intake sheet or checkout note? check out  2    3  What would be a preferred day of the week that would work best for your infusion appointment?no mondays   4  Do you prefer mornings or afternoons for your appointments? Early afternoon  5    6  Are there any days or dates that do not work for your schedule, including any upcoming vacations? No vacation  7  We are going to try our best to schedule you at the infusion center closest to your home  In the event that we are unable to what would be your next preferred infusion site or sites? [facility]    8  Do you have transportation to take you to all of your appointments?  Yes  9    10  Would you like the infusion center to draw labs from your port? (disregard if patient doesn't have a port or need labs for infusion appointment) no

## 2022-08-19 NOTE — TELEPHONE ENCOUNTER
We do not have a Tuesday-Thursday available until Tuesday 9/06  Otherwise first available will be Monday 8/29

## 2022-08-19 NOTE — TELEPHONE ENCOUNTER
Call out to patient to inform that Allupurinol will increase from 200mg to 300mg while on this treatment  Patient appreciative of call and will reach out to our office if any difficulty with obtaining  Patient verbalized understanding

## 2022-08-19 NOTE — TELEPHONE ENCOUNTER
Spoke with patient - he is having his port placed on 8/29 and is okay with a start date of 9/6  Please schedule accordingly  Thank you!

## 2022-08-19 NOTE — TELEPHONE ENCOUNTER
Please schedule patient for tx  Available Tuesday through Thursday, an early afternoon is preferred but doctor wants him to start within 1-2 wees  , so whatever is available to get him started please  Thank you!

## 2022-08-23 ENCOUNTER — HOSPITAL ENCOUNTER (OUTPATIENT)
Dept: NON INVASIVE DIAGNOSTICS | Facility: HOSPITAL | Age: 67
Discharge: HOME | End: 2022-08-23
Payer: MEDICARE

## 2022-08-23 ENCOUNTER — TELEPHONE (OUTPATIENT)
Dept: HEMATOLOGY ONCOLOGY | Facility: CLINIC | Age: 67
End: 2022-08-23

## 2022-08-23 VITALS
SYSTOLIC BLOOD PRESSURE: 112 MMHG | WEIGHT: 303 LBS | DIASTOLIC BLOOD PRESSURE: 70 MMHG | BODY MASS INDEX: 37.67 KG/M2 | HEART RATE: 73 BPM | HEIGHT: 75 IN

## 2022-08-23 DIAGNOSIS — I35.8 OTHER NONRHEUMATIC AORTIC VALVE DISORDERS: ICD-10-CM

## 2022-08-23 LAB
AORTIC ROOT: 3.9 CM
AORTIC VALVE MEAN VELOCITY: 19.6 M/S
APICAL FOUR CHAMBER EJECTION FRACTION: 62 %
ASCENDING AORTA: 3.6 CM
AV AREA BY CONTINUOUS VTI: 2.7 CM2
AV AREA PEAK VELOCITY: 2.1 CM2
AV LVOT MEAN GRADIENT: 3 MMHG
AV LVOT PEAK GRADIENT: 5 MMHG
AV MEAN GRADIENT: 17 MMHG
AV PEAK GRADIENT: 28 MMHG
AV VALVE AREA: 2.7 CM2
AV VELOCITY RATIO: 0.43
DOP CALC AO PEAK VEL: 2.66 M/S
DOP CALC AO VTI: 48.48 CM
DOP CALC LVOT AREA: 4.91 CM2
DOP CALC LVOT DIAMETER: 2.5 CM
DOP CALC LVOT PEAK VEL VTI: 26.66 CM
DOP CALC LVOT PEAK VEL: 1.15 M/S
DOP CALC LVOT STROKE INDEX: 51.1 ML/M2
DOP CALC LVOT STROKE VOLUME: 130.8 CM3
E WAVE DECELERATION TIME: 289 MS
FRACTIONAL SHORTENING: 39 % (ref 28–44)
INTERVENTRICULAR SEPTUM IN DIASTOLE (PARASTERNAL SHORT AXIS VIEW): 1.5 CM
INTERVENTRICULAR SEPTUM: 1.5 CM (ref 0.6–1.1)
LAAS-AP2: 25.6 CM2
LAAS-AP4: 21.5 CM2
LEFT ATRIUM AREA SYSTOLE SINGLE PLANE A4C: 20.4 CM2
LEFT ATRIUM SIZE: 4.2 CM
LEFT INTERNAL DIMENSION IN SYSTOLE: 3.5 CM (ref 2.1–4)
LEFT VENTRICULAR INTERNAL DIMENSION IN DIASTOLE: 5.7 CM (ref 3.5–6)
LEFT VENTRICULAR POSTERIOR WALL IN END DIASTOLE: 1.5 CM
LEFT VENTRICULAR STROKE VOLUME: 108 ML
LVSV (TEICH): 108 ML
MV E'TISSUE VEL-SEP: 8 CM/S
MV PEAK A VEL: 1.1 M/S
MV PEAK E VEL: 65 CM/S
MV STENOSIS PRESSURE HALF TIME: 84 MS
MV VALVE AREA P 1/2 METHOD: 2.62 CM2
RIGHT ATRIUM AREA SYSTOLE A4C: 20.3 CM2
RIGHT VENTRICLE ID DIMENSION: 4 CM
SL CV LEFT ATRIUM LENGTH A2C: 6.1 CM
SL CV PED ECHO LEFT VENTRICLE DIASTOLIC VOLUME (MOD BIPLANE) 2D: 158 ML
SL CV PED ECHO LEFT VENTRICLE SYSTOLIC VOLUME (MOD BIPLANE) 2D: 50 ML

## 2022-08-23 PROCEDURE — 93306 TTE W/DOPPLER COMPLETE: CPT | Performed by: INTERNAL MEDICINE

## 2022-08-23 PROCEDURE — 93306 TTE W/DOPPLER COMPLETE: CPT

## 2022-08-23 NOTE — TELEPHONE ENCOUNTER
Called patient  Patient's wife was interested in knowing approximate risk of AML for a patient using Bendamustine  I discussed with Dr Polo Teresa  According to below hyperlink, risk in that cohort was about 0 75%  Informed patient who will let wife know  They still want to pursue  Treatment as scheduled  He appreciated call       In Focus: Bendamustine for Non-Hodgkin Lymphomas - Cancer Therapy Advisor

## 2022-08-29 ENCOUNTER — TELEPHONE (OUTPATIENT)
Dept: INTERVENTIONAL RADIOLOGY/VASCULAR | Facility: HOSPITAL | Age: 67
End: 2022-08-29

## 2022-08-29 RX ORDER — SODIUM CHLORIDE 9 MG/ML
75 INJECTION, SOLUTION INTRAVENOUS CONTINUOUS
Status: CANCELLED | OUTPATIENT
Start: 2022-08-29

## 2022-08-29 NOTE — PRE-PROCEDURE INSTRUCTIONS
Pt given an arrival time of 8:30am on 9/1 at [facility] for his port placement  Instructed to hold metformin, have nothing to eat or drink after midnight and to have a ride home after the procedure

## 2022-08-30 ENCOUNTER — OFFICE VISIT (OUTPATIENT)
Dept: CARDIOLOGY CLINIC | Facility: CLINIC | Age: 67
End: 2022-08-30
Payer: MEDICARE

## 2022-08-30 VITALS
BODY MASS INDEX: 37.3 KG/M2 | RESPIRATION RATE: 18 BRPM | OXYGEN SATURATION: 98 % | SYSTOLIC BLOOD PRESSURE: 118 MMHG | DIASTOLIC BLOOD PRESSURE: 70 MMHG | HEIGHT: 75 IN | HEART RATE: 82 BPM | WEIGHT: 300 LBS

## 2022-08-30 DIAGNOSIS — E11.622 TYPE 2 DIABETES MELLITUS WITH OTHER SKIN ULCER (CODE) (HCC): ICD-10-CM

## 2022-08-30 DIAGNOSIS — I71.20 THORACIC AORTIC ANEURYSM WITHOUT RUPTURE: Primary | ICD-10-CM

## 2022-08-30 DIAGNOSIS — R12 HEART BURN: ICD-10-CM

## 2022-08-30 DIAGNOSIS — R07.9 CHEST PAIN, UNSPECIFIED TYPE: ICD-10-CM

## 2022-08-30 PROCEDURE — 99204 OFFICE O/P NEW MOD 45 MIN: CPT | Performed by: INTERNAL MEDICINE

## 2022-08-30 NOTE — PROGRESS NOTES
OP Consultation - Cardiology    Stu Noland 73 y o  male MRN: [id number]    Physician Requesting Consult: Self referral    Reason for Consult / Chief Complaint: Prechemo evaluation    HPI:     73year old man with history of follicular lymphoma, diabetes, CKD, hypertension who presents to establish care prior to starting chemo  He does complaint of non-stop heart burn for past 6 weeks  Has been evaluated by GI and oncology  Thought to be progression of his abdominal lymphadenopathy    Symptoms are constant  Day and night  States prior to 6 weeks, he never had the symptoms  Does have mild shortness of breath  He otherwise denies exertional symptoms per se  Denies lightheadedness, dizziness, presyncope or syncope  Social History:  Tobacco: Never  Alcohol: Never    No family history of aortic aneurysm or sudden death      FAMILY HISTORY:  Family History   Problem Relation Age of Onset    Cancer Father         Leukemia        MEDS & ALLERGIES:  Allergies   Allergen Reactions    Lisinopril Cough         Current Outpatient Medications:     allopurinol (ZYLOPRIM) 100 mg tablet, Take 3 tablets (300 mg total) by mouth daily, Disp: 30 tablet, Rfl: 1    amLODIPine (NORVASC) 10 mg tablet, take 1 tablet by mouth once daily AT NOON, Disp: , Rfl:     aspirin 81 mg chewable tablet, Chew 81 mg daily, Disp: , Rfl:     famotidine (PEPCID) 40 MG tablet, Take 1 tablet (40 mg total) by mouth daily at bedtime, Disp: 30 tablet, Rfl: 3    fluticasone (FLONASE) 50 mcg/act nasal spray, instill 2 sprays into each nostril one to two times a day, Disp: , Rfl:     hydrochlorothiazide (HYDRODIURIL) 25 mg tablet, Take 25 mg by mouth daily, Disp: , Rfl:     lidocaine-prilocaine (EMLA) cream, Apply to port area 30 minutes prior to access, Disp: 30 g, Rfl: 0    losartan (COZAAR) 100 MG tablet, Take 0 5 tablets (50 mg total) by mouth daily (Patient taking differently: Take 100 mg by mouth daily), Disp: , Rfl:     metFORMIN (GLUCOPHAGE) 500 mg tablet, Take 1,000 mg by mouth 2 (two) times a day with meals, Disp: , Rfl:     metoprolol succinate (TOPROL-XL) 50 mg 24 hr tablet, Take 50 mg by mouth every evening, Disp: , Rfl:     Multiple Vitamin (multivitamin) tablet, Take 1 tablet by mouth daily, Disp: , Rfl:     ondansetron (ZOFRAN) 8 mg tablet, Take 1 tablet (8 mg total) by mouth every 8 (eight) hours as needed for nausea or vomiting, Disp: 30 tablet, Rfl: 2    pantoprazole (PROTONIX) 40 mg tablet, Take 1 tablet (40 mg total) by mouth 2 (two) times a day before meals, Disp: 60 tablet, Rfl: 3    simvastatin (ZOCOR) 40 mg tablet, Take 40 mg by mouth daily at bedtime, Disp: , Rfl:     sucralfate (CARAFATE) 1 g/10 mL suspension, Take 10 mL (1 g total) by mouth 4 (four) times a day as needed (abdominal pain), Disp: 1200 mL, Rfl: 1    Multiple Vitamins-Minerals (Centrum Silver 50+Men) TABS, , Disp: , Rfl:     predniSONE 20 mg tablet, Take by mouth daily (Patient not taking: No sig reported), Disp: , Rfl:     Past Medical History:   Diagnosis Date    Chronic kidney disease     [facility]     High cholesterol     Hypertension          Review of Systems:  Review of Systems   Constitutional: Negative  Negative for activity change, appetite change, fatigue and fever  Respiratory: Negative for cough, chest tightness, shortness of breath and wheezing  Cardiovascular: Negative  Negative for chest pain, palpitations and leg swelling  Gastrointestinal: Negative for nausea and vomiting  Musculoskeletal: Negative for back pain  Neurological: Negative for dizziness, syncope, weakness and light-headedness  Psychiatric/Behavioral: Negative for behavioral problems  All other systems reviewed and are negative        PHYSICAL EXAM:  Vitals:   Vitals:    08/30/22 1051   BP: 118/70   BP Location: Left arm   Patient Position: Sitting   Cuff Size: Standard   Pulse: 82   Resp: 18   SpO2: 98%   Weight: 136 kg (300 lb) Height: 6' 3" (1 905 m)        Physical Exam:  Physical Exam  Vitals and nursing note reviewed  Constitutional:       General: He is not in acute distress  Appearance: Normal appearance  He is not ill-appearing or diaphoretic  HENT:      Head: Normocephalic and atraumatic  Eyes:      Extraocular Movements: Extraocular movements intact  Cardiovascular:      Rate and Rhythm: Normal rate and regular rhythm  Heart sounds: Murmur heard  No friction rub  No gallop  Pulmonary:      Effort: Pulmonary effort is normal  No respiratory distress  Breath sounds: Normal breath sounds  Abdominal:      General: There is no distension  Palpations: Abdomen is soft  Musculoskeletal:         General: No swelling  Normal range of motion  Skin:     General: Skin is warm and dry  Capillary Refill: Capillary refill takes less than 2 seconds  Coloration: Skin is not pale  Neurological:      General: No focal deficit present  Mental Status: He is alert and oriented to person, place, and time     Psychiatric:         Mood and Affect: Mood normal          LABORATORY RESULTS:    Lab Results   Component Value Date    WBC 11 25 (H) 08/19/2022    HGB 10 6 (L) 08/19/2022    HCT 35 1 (L) 08/19/2022    MCV 85 08/19/2022     08/19/2022     Lab Results   Component Value Date    GLUCOSE 217 (H) 07/08/2021    CALCIUM 10 2 (H) 08/19/2022     10/06/2015    K 4 7 08/19/2022    CO2 29 08/19/2022     08/19/2022    BUN 22 08/19/2022    CREATININE 1 19 08/19/2022     Lab Results   Component Value Date    HGBA1C 6 4 (H) 02/18/2022       Lipid Profile:   Lab Results   Component Value Date    CHOL 136 10/06/2015     Lab Results   Component Value Date    HDL 33 (L) 02/18/2022    HDL 34 (L) 08/10/2021    HDL 29 (L) 05/21/2021     Lab Results   Component Value Date    LDLCALC 83 02/18/2022    LDLCALC 78 08/10/2021    LDLCALC 60 05/21/2021     Lab Results   Component Value Date    TRIG 231 (H) 08/10/2022    TRIG 228 (H) 02/18/2022    TRIG 200 (H) 08/10/2021       The 10-year ASCVD risk score (Lizett Paulino et al , 2013) is: 24 9%    Values used to calculate the score:      Age: 73 years      Sex: Male      Is Non- : No      Diabetic: Yes      Tobacco smoker: No      Systolic Blood Pressure: 393 mmHg      Is BP treated: Yes      HDL Cholesterol: 33 mg/dL      Total Cholesterol: 156 mg/dL    Imaging: I have personally reviewed pertinent reports  No results found  EKG reviewed personally:  Normal sinus rhythm    Assessment and Plan:    Caren Tena was seen today for heart murmur  Diagnoses and all orders for this visit:    Thoracic aortic aneurysm without rupture ([facility] )  -     CTA chest wo w contrast; Future    Type 2 diabetes mellitus with other skin ulcer (CODE) ([facility] )    Heart burn    Chest pain, unspecified type  -     NM myocardial perfusion spect (rx stress and/or rest); Future         1  Follicle lymphoma - about to start chemotherapy  2  Heartburn  3  Aortic aneurysm  4  Diabetes  5  Mild to moderate calcific aortic stenosis    Echocardiogram was reviewed  Showed normal function  On my review, patient has mild-to-moderate aortic stenosis  Plan to start chemotherapy next week  Will need serial echocardiogram in 3 months to follow progression and chemo effects    Patient also has complaints of heartburn  Likely due to increasing abdominal adenopathy  However, cannot rule out underlying CAD  Will schedule for a pharmacological nuclear stress test   Patient unable to exercise due to malignancy as well as history of aortic aneurysm  Will check CTA chest for yearly follow-up on aortic aneurysm  Blood pressure is well controlled  Continue current medications  Continue statin for dyslipidemia  Recommend patient presents to the emergency room or call our office if he has any new/persistent or progressive symptoms      Return to clinic in 2 - 3 months or LAURA Howard MD  8/30/2022,12:20 PM

## 2022-08-30 NOTE — PATIENT INSTRUCTIONS
Recommend a CTA chest for serial evaluation of  aortic aneurysm  Recommend pharmacological nuclear stress test  Recommend repeat echo in 2 -3 months

## 2022-09-01 ENCOUNTER — HOSPITAL ENCOUNTER (OUTPATIENT)
Dept: NON INVASIVE DIAGNOSTICS | Facility: HOSPITAL | Age: 67
Discharge: HOME | End: 2022-09-01
Payer: MEDICARE

## 2022-09-01 VITALS
DIASTOLIC BLOOD PRESSURE: 66 MMHG | SYSTOLIC BLOOD PRESSURE: 110 MMHG | HEIGHT: 75 IN | OXYGEN SATURATION: 93 % | BODY MASS INDEX: 37.03 KG/M2 | RESPIRATION RATE: 20 BRPM | TEMPERATURE: 97.5 F | HEART RATE: 67 BPM | WEIGHT: 297.84 LBS

## 2022-09-01 DIAGNOSIS — C48.1: ICD-10-CM

## 2022-09-01 DIAGNOSIS — C82.90 FOLLICULAR LYMPHOMA, UNSPECIFIED FOLLICULAR LYMPHOMA TYPE, UNSPECIFIED BODY REGION (HCC): ICD-10-CM

## 2022-09-01 LAB — GLUCOSE SERPL-MCNC: 167 MG/DL (ref 65–140)

## 2022-09-01 PROCEDURE — 82948 REAGENT STRIP/BLOOD GLUCOSE: CPT

## 2022-09-01 PROCEDURE — 77001 FLUOROGUIDE FOR VEIN DEVICE: CPT | Performed by: RADIOLOGY

## 2022-09-01 PROCEDURE — C1788 PORT, INDWELLING, IMP: HCPCS

## 2022-09-01 PROCEDURE — C1769 GUIDE WIRE: HCPCS

## 2022-09-01 PROCEDURE — 76937 US GUIDE VASCULAR ACCESS: CPT | Performed by: RADIOLOGY

## 2022-09-01 PROCEDURE — 99152 MOD SED SAME PHYS/QHP 5/>YRS: CPT

## 2022-09-01 PROCEDURE — 36561 INSERT TUNNELED CV CATH: CPT | Performed by: RADIOLOGY

## 2022-09-01 PROCEDURE — 36561 INSERT TUNNELED CV CATH: CPT

## 2022-09-01 PROCEDURE — 76937 US GUIDE VASCULAR ACCESS: CPT

## 2022-09-01 PROCEDURE — 77001 FLUOROGUIDE FOR VEIN DEVICE: CPT

## 2022-09-01 PROCEDURE — 99152 MOD SED SAME PHYS/QHP 5/>YRS: CPT | Performed by: RADIOLOGY

## 2022-09-01 RX ORDER — FENTANYL CITRATE 50 UG/ML
INJECTION, SOLUTION INTRAMUSCULAR; INTRAVENOUS CODE/TRAUMA/SEDATION MEDICATION
Status: COMPLETED | OUTPATIENT
Start: 2022-09-01 | End: 2022-09-01

## 2022-09-01 RX ORDER — MIDAZOLAM HYDROCHLORIDE 2 MG/2ML
INJECTION, SOLUTION INTRAMUSCULAR; INTRAVENOUS CODE/TRAUMA/SEDATION MEDICATION
Status: COMPLETED | OUTPATIENT
Start: 2022-09-01 | End: 2022-09-01

## 2022-09-01 RX ORDER — SODIUM CHLORIDE 9 MG/ML
75 INJECTION, SOLUTION INTRAVENOUS CONTINUOUS
Status: DISCONTINUED | OUTPATIENT
Start: 2022-09-01 | End: 2022-09-02 | Stop reason: HOSPADM

## 2022-09-01 RX ORDER — LIDOCAINE HYDROCHLORIDE AND EPINEPHRINE 10; 10 MG/ML; UG/ML
INJECTION, SOLUTION INFILTRATION; PERINEURAL CODE/TRAUMA/SEDATION MEDICATION
Status: COMPLETED | OUTPATIENT
Start: 2022-09-01 | End: 2022-09-01

## 2022-09-01 RX ADMIN — MIDAZOLAM HYDROCHLORIDE 1 MG: 1 INJECTION, SOLUTION INTRAMUSCULAR; INTRAVENOUS at 09:49

## 2022-09-01 RX ADMIN — FENTANYL CITRATE 50 MCG: 50 INJECTION, SOLUTION INTRAMUSCULAR; INTRAVENOUS at 10:19

## 2022-09-01 RX ADMIN — FENTANYL CITRATE 50 MCG: 50 INJECTION, SOLUTION INTRAMUSCULAR; INTRAVENOUS at 10:06

## 2022-09-01 RX ADMIN — FENTANYL CITRATE 50 MCG: 50 INJECTION, SOLUTION INTRAMUSCULAR; INTRAVENOUS at 09:50

## 2022-09-01 RX ADMIN — LIDOCAINE HYDROCHLORIDE,EPINEPHRINE BITARTRATE 10 ML: 10; .01 INJECTION, SOLUTION INFILTRATION; PERINEURAL at 10:01

## 2022-09-01 RX ADMIN — Medication 10 ML: at 10:05

## 2022-09-01 RX ADMIN — FENTANYL CITRATE 50 MCG: 50 INJECTION, SOLUTION INTRAMUSCULAR; INTRAVENOUS at 10:01

## 2022-09-01 RX ADMIN — MIDAZOLAM HYDROCHLORIDE 1 MG: 1 INJECTION, SOLUTION INTRAMUSCULAR; INTRAVENOUS at 09:53

## 2022-09-01 RX ADMIN — SODIUM CHLORIDE 75 ML/HR: 0.9 INJECTION, SOLUTION INTRAVENOUS at 09:17

## 2022-09-01 RX ADMIN — CEFAZOLIN 3000 MG: 1 INJECTION, POWDER, FOR SOLUTION INTRAMUSCULAR; INTRAVENOUS at 09:39

## 2022-09-01 NOTE — BRIEF OP NOTE (RAD/CATH)
INTERVENTIONAL RADIOLOGY PROCEDURE NOTE    Date: 9/1/2022    Procedure: IR PORT PLACEMENT    Preoperative diagnosis:   1  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[address] )    2  Follicular lymphoma, unspecified follicular lymphoma type, unspecified body region [facility])         Postoperative diagnosis: Same  Surgeon: John Benjamin MD     Assistant: None  No qualified resident was available  Blood loss: minimal    Specimens: none     Findings: successful chest port placement    Complications: None immediate      Anesthesia: conscious sedation

## 2022-09-01 NOTE — DISCHARGE INSTRUCTIONS
Implanted Venous Access Port     WHAT YOU NEED TO KNOW:   An implanted venous access port is a device used to give treatments and take blood  It may also be called a central venous access device (CVAD)  The port is a small container that is placed under your skin, usually in your upper chest  The port is attached to a catheter that enters a large vein  DISCHARGE INSTRUCTIONS:   Resume your normal diet  Small sips of flat soda will help with mild nausea  Prevent an infection:   Wash your hands often  Use soap and water  Clean your hands before and after you care for your port  Remind everyone who cares for your port to wash their hands  Check your skin for infection every day  Look for redness, swelling, or fluid oozing from the port site  Care for your port:   1  You may shower beginning 48 hours after procedure  2   Leave glue in place  3  It is normal for some bruising to occur  4  Use Tylenol for pain  5  Limit use of arm on the side that your port was placed  Lift nothing heavier than 5 pounds for 1 week, and then gradually increase activity as tolerated  6  DO NOT apply ointment, lotion or cream to port site until incision is healed  Allow glue to fall off  DO NOT attempt to peel glue from skin even it it begins to flake  7  After the port incision is healed you may swim, bathe  Notify the Interventional Radiologist if you have any of the followin  Fever above 101 F    2  Increased redness or swelling after 1st day  3  Increased pain after 1st day  4  Any sign of infection (drainage from port site, skin separation, hot to touch)  5  Persistent nausea or vomiting  Contact Interventional Radiology at [phone] [facility] PATIENTS: Contact Interventional Radiology at [phone]) ([address] [facility] [address]: Contact Interventional Radiology at [phone])

## 2022-09-01 NOTE — H&P
Interventional Radiology Preprocedure Note    History/Indication for procedure:   Lucía Jimenez is a 73 y o  male with history of lymphoma who is presenting today for chest port placement      Relevant past medical history:    Past Medical History:   Diagnosis Date    Chronic kidney disease     Diabetes mellitus [facility] )     [facility]     High cholesterol     Hypertension      Patient Active Problem List   Diagnosis    Diabetes 1 5, managed as type 2 ([facility] )    Hypertension    Hypercholesteremia    Hammer toe of right foot    Stasis dermatitis of both legs    Diabetic peripheral neuropathy (HCC)    Gout    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HCC)    Obesity with body mass index 30 or greater    Type 2 diabetes mellitus (HCC)    Retroperitoneal hematoma    Mesenteric lymphadenopathy    Acute blood loss anemia    Heart murmur    GI bleed    Pleural effusion    Chronic venous hypertension (idiopathic) with ulcer of right lower extremity (HCC)    Rash    FINA (acute kidney injury) (HCC)    Stage 3a chronic kidney disease (HCC)    Hypertensive kidney disease with stage 3 chronic kidney disease (HCC)    Other proteinuria    Obesity, morbid (HCC)    Follicular lymphoma grade I of lymph nodes of multiple sites (HCC)       /68   Pulse 78   Temp (!) 97 3 °F (36 3 °C) (Temporal)   Resp (!) 11   Ht 6' 3" (1 905 m)   Wt 135 kg (297 lb 13 5 oz)   SpO2 97%   BMI 37 23 kg/m²     Medications:    Inpatient Medications:     Scheduled Medications:  Current Facility-Administered Medications   Medication Dose Route Frequency Provider Last Rate    cefazolin  3,000 mg Intravenous Once Chante Ingram MD      sodium chloride  75 mL/hr Intravenous Continuous Chante Ingram MD 75 mL/hr (09/01/22 6755)       Infusions:  sodium chloride, 75 mL/hr, Last Rate: 75 mL/hr (09/01/22 7032)        PRN:      Outpatient Medications:  Current Outpatient Medications on File Prior to Encounter   Medication Sig Dispense Refill    allopurinol (ZYLOPRIM) 100 mg tablet Take 3 tablets (300 mg total) by mouth daily 30 tablet 1    amLODIPine (NORVASC) 10 mg tablet take 1 tablet by mouth once daily AT NOON      aspirin 81 mg chewable tablet Chew 81 mg daily      famotidine (PEPCID) 40 MG tablet Take 1 tablet (40 mg total) by mouth daily at bedtime 30 tablet 3    fluticasone (FLONASE) 50 mcg/act nasal spray instill 2 sprays into each nostril one to two times a day      hydrochlorothiazide (HYDRODIURIL) 25 mg tablet Take 25 mg by mouth daily      losartan (COZAAR) 100 MG tablet Take 0 5 tablets (50 mg total) by mouth daily (Patient taking differently: Take 100 mg by mouth daily)      metFORMIN (GLUCOPHAGE) 500 mg tablet Take 1,000 mg by mouth 2 (two) times a day with meals      metoprolol succinate (TOPROL-XL) 50 mg 24 hr tablet Take 50 mg by mouth every evening      Multiple Vitamin (multivitamin) tablet Take 1 tablet by mouth daily      Multiple Vitamins-Minerals (Centrum Silver 50+Men) TABS       pantoprazole (PROTONIX) 40 mg tablet Take 1 tablet (40 mg total) by mouth 2 (two) times a day before meals 60 tablet 3    simvastatin (ZOCOR) 40 mg tablet Take 40 mg by mouth daily at bedtime      lidocaine-prilocaine (EMLA) cream Apply to port area 30 minutes prior to access 30 g 0    ondansetron (ZOFRAN) 8 mg tablet Take 1 tablet (8 mg total) by mouth every 8 (eight) hours as needed for nausea or vomiting 30 tablet 2    predniSONE 20 mg tablet Take by mouth daily (Patient not taking: No sig reported)      sucralfate (CARAFATE) 1 g/10 mL suspension Take 10 mL (1 g total) by mouth 4 (four) times a day as needed (abdominal pain) 1200 mL 1     No current facility-administered medications on file prior to encounter         Allergies   Allergen Reactions    Lisinopril Cough       Anticoagulants: none    ASA classification: ASA 2 - Patient with mild systemic disease with no functional limitations    Airway Assessment: II (hard and soft palate, upper portion of tonsils anduvula visible)    Relevant family history: None    Relevant review of systems: None    Prior sedation/anesthesia: yes    Can the patient lie flat? Yes     Does patient have sleep apnea? No    If yes, does patient use CPAP? not applicable    NPO Status: yes    Labs:   CBC with diff:   Lab Results   Component Value Date    WBC 11 25 (H) 08/19/2022    HGB 10 6 (L) 08/19/2022    HCT 35 1 (L) 08/19/2022    MCV 85 08/19/2022     08/19/2022    MCH 25 7 (L) 08/19/2022    MCHC 30 2 (L) 08/19/2022    RDW 14 6 08/19/2022    MPV 10 7 08/19/2022    NRBC 0 08/19/2022     BMP/CMP:  Lab Results   Component Value Date     10/06/2015    K 4 7 08/19/2022    K 4 1 10/06/2015     08/19/2022     10/06/2015    CO2 29 08/19/2022    CO2 24 07/08/2021    ANIONGAP 7 10/06/2015    BUN 22 08/19/2022    BUN 23 10/06/2015    CREATININE 1 19 08/19/2022    CREATININE 0 97 10/06/2015    GLUCOSE 217 (H) 07/08/2021    GLUCOSE 111 10/06/2015    CALCIUM 10 2 (H) 08/19/2022    CALCIUM 9 0 10/06/2015    AST 75 (H) 08/19/2022    AST 31 10/06/2015     (H) 08/19/2022    ALT 48 10/06/2015    ALKPHOS 107 08/19/2022    ALKPHOS 62 10/06/2015    PROT 6 8 10/06/2015    BILITOT 0 44 10/06/2015    EGFR 63 08/19/2022   ,     Coags:   Lab Results   Component Value Date    PTT 41 (H) 07/19/2021    PTT 39 (H) 10/06/2015    INR 1 28 (H) 07/19/2021    INR 1 14 10/06/2015   ,          Relevant imaging studies:   None    Directed physical examination:  No apparent distress  AO x3  Normal respiratory effort  Regular rate and rhythm    Assessment/Plan:   Chest port placement    Sedation/Anesthesia plan: Moderate sedation will be used as needed for procedure      Consent with alternatives to the procedure, risks and benefits have been explained and discussed with the patient/patient's family: yes

## 2022-09-02 ENCOUNTER — TELEPHONE (OUTPATIENT)
Dept: HEMATOLOGY ONCOLOGY | Facility: CLINIC | Age: 67
End: 2022-09-02

## 2022-09-02 NOTE — TELEPHONE ENCOUNTER
CALL RETURN FORM   Reason for patient call? Patient calling to confirm what medications he should be taking and how often  He starts chemo on 9/6/22   Patient's primary oncologist? JENY LUCERO [facility]   Name of person the patient was calling for? Bess   Any additional information to add, if applicable? [phone]- please leave message and also can use my chart    Informed patient that the message will be forwarded to the team and someone will get back to them as soon as possible    Did you relay this information to the patient?  yes

## 2022-09-02 NOTE — TELEPHONE ENCOUNTER
Call out to patient in regards to questions and concerns  Reviewed with patient about allopurinol and also that zofran is PRN medication  Reviewed that we would want patient to take blood pressure medications and prescribed and to have food and hydration  Patient was appreciative of call

## 2022-09-06 ENCOUNTER — HOSPITAL ENCOUNTER (OUTPATIENT)
Dept: INFUSION CENTER | Facility: CLINIC | Age: 67
Discharge: HOME | End: 2022-09-06
Payer: MEDICARE

## 2022-09-06 VITALS
RESPIRATION RATE: 17 BRPM | BODY MASS INDEX: 36.7 KG/M2 | WEIGHT: 295.2 LBS | DIASTOLIC BLOOD PRESSURE: 66 MMHG | HEIGHT: 75 IN | TEMPERATURE: 97.5 F | HEART RATE: 80 BPM | SYSTOLIC BLOOD PRESSURE: 135 MMHG

## 2022-09-06 DIAGNOSIS — C82.08 FOLLICULAR LYMPHOMA GRADE I OF LYMPH NODES OF MULTIPLE SITES (HCC): Primary | ICD-10-CM

## 2022-09-06 LAB
ALBUMIN SERPL BCP-MCNC: 3 G/DL (ref 3.5–5)
ALP SERPL-CCNC: 118 U/L (ref 46–116)
ALT SERPL W P-5'-P-CCNC: 92 U/L (ref 12–78)
ANION GAP SERPL CALCULATED.3IONS-SCNC: 10 MMOL/L (ref 4–13)
AST SERPL W P-5'-P-CCNC: 61 U/L (ref 5–45)
BASOPHILS # BLD AUTO: 0.09 THOUSANDS/ΜL (ref 0–0.1)
BASOPHILS NFR BLD AUTO: 1 % (ref 0–1)
BILIRUB SERPL-MCNC: 0.3 MG/DL (ref 0.2–1)
BUN SERPL-MCNC: 25 MG/DL (ref 5–25)
CALCIUM ALBUM COR SERPL-MCNC: 10.4 MG/DL (ref 8.3–10.1)
CALCIUM SERPL-MCNC: 9.6 MG/DL (ref 8.3–10.1)
CHLORIDE SERPL-SCNC: 99 MMOL/L (ref 96–108)
CO2 SERPL-SCNC: 28 MMOL/L (ref 21–32)
CREAT SERPL-MCNC: 1.26 MG/DL (ref 0.6–1.3)
EOSINOPHIL # BLD AUTO: 0.31 THOUSAND/ΜL (ref 0–0.61)
EOSINOPHIL NFR BLD AUTO: 3 % (ref 0–6)
ERYTHROCYTE [DISTWIDTH] IN BLOOD BY AUTOMATED COUNT: 15.2 % (ref 11.6–15.1)
GFR SERPL CREATININE-BSD FRML MDRD: 59 ML/MIN/1.73SQ M
GLUCOSE SERPL-MCNC: 133 MG/DL (ref 65–140)
HCT VFR BLD AUTO: 32.8 % (ref 36.5–49.3)
HGB BLD-MCNC: 9.8 G/DL (ref 12–17)
IMM GRANULOCYTES # BLD AUTO: 0.08 THOUSAND/UL (ref 0–0.2)
IMM GRANULOCYTES NFR BLD AUTO: 1 % (ref 0–2)
LYMPHOCYTES # BLD AUTO: 0.77 THOUSANDS/ΜL (ref 0.6–4.47)
LYMPHOCYTES NFR BLD AUTO: 6 % (ref 14–44)
MCH RBC QN AUTO: 24.6 PG (ref 26.8–34.3)
MCHC RBC AUTO-ENTMCNC: 29.9 G/DL (ref 31.4–37.4)
MCV RBC AUTO: 82 FL (ref 82–98)
MONOCYTES # BLD AUTO: 1.12 THOUSAND/ΜL (ref 0.17–1.22)
MONOCYTES NFR BLD AUTO: 9 % (ref 4–12)
NEUTROPHILS # BLD AUTO: 9.88 THOUSANDS/ΜL (ref 1.85–7.62)
NEUTS SEG NFR BLD AUTO: 80 % (ref 43–75)
NRBC BLD AUTO-RTO: 0 /100 WBCS
PLATELET # BLD AUTO: 299 THOUSANDS/UL (ref 149–390)
PMV BLD AUTO: 10 FL (ref 8.9–12.7)
POTASSIUM SERPL-SCNC: 4 MMOL/L (ref 3.5–5.3)
PROT SERPL-MCNC: 7.7 G/DL (ref 6.4–8.4)
RBC # BLD AUTO: 3.98 MILLION/UL (ref 3.88–5.62)
SODIUM SERPL-SCNC: 137 MMOL/L (ref 135–147)
WBC # BLD AUTO: 12.25 THOUSAND/UL (ref 4.31–10.16)

## 2022-09-06 PROCEDURE — 85025 COMPLETE CBC W/AUTO DIFF WBC: CPT | Performed by: INTERNAL MEDICINE

## 2022-09-06 PROCEDURE — 96367 TX/PROPH/DG ADDL SEQ IV INF: CPT

## 2022-09-06 PROCEDURE — 96415 CHEMO IV INFUSION ADDL HR: CPT

## 2022-09-06 PROCEDURE — 80053 COMPREHEN METABOLIC PANEL: CPT | Performed by: INTERNAL MEDICINE

## 2022-09-06 PROCEDURE — 96411 CHEMO IV PUSH ADDL DRUG: CPT

## 2022-09-06 PROCEDURE — 96413 CHEMO IV INFUSION 1 HR: CPT

## 2022-09-06 RX ORDER — SODIUM CHLORIDE 9 MG/ML
20 INJECTION, SOLUTION INTRAVENOUS ONCE
Status: COMPLETED | OUTPATIENT
Start: 2022-09-06 | End: 2022-09-06

## 2022-09-06 RX ORDER — ACETAMINOPHEN 325 MG/1
650 TABLET ORAL ONCE
Status: COMPLETED | OUTPATIENT
Start: 2022-09-06 | End: 2022-09-06

## 2022-09-06 RX ADMIN — DEXAMETHASONE SODIUM PHOSPHATE: 10 INJECTION, SOLUTION INTRAMUSCULAR; INTRAVENOUS at 10:08

## 2022-09-06 RX ADMIN — ACETAMINOPHEN 650 MG: 325 TABLET ORAL at 09:42

## 2022-09-06 RX ADMIN — BENDAMUSTINE HYDROCHLORIDE 234.9 MG: 25 INJECTION, SOLUTION INTRAVENOUS at 15:17

## 2022-09-06 RX ADMIN — SODIUM CHLORIDE 20 ML/HR: 0.9 INJECTION, SOLUTION INTRAVENOUS at 09:35

## 2022-09-06 RX ADMIN — DIPHENHYDRAMINE HYDROCHLORIDE 25 MG: 50 INJECTION, SOLUTION INTRAMUSCULAR; INTRAVENOUS at 09:41

## 2022-09-06 RX ADMIN — RITUXIMAB 978.8 MG: 10 INJECTION, SOLUTION INTRAVENOUS at 10:56

## 2022-09-06 NOTE — PROGRESS NOTES
Pt to clinic for rituxan and bendeka  Pt offers no complaints today  Labs were drawn via port and reviewed prior to start of infusion  Tolerated infusions without complications  Pt aware of next appointment  Pt port accessed, flushed, and de-accessed with positive blood returned  AVS was offered, pt refused  Pt ambulated out of clinic safely

## 2022-09-06 NOTE — PROGRESS NOTES
Stat labs added for 9/6/22 due to patient having labs done last at 8/19/22  Per Dr Yuri Goncalves would like stat labs resulted prior to treatment on 9/6/22    Infusion RN notified

## 2022-09-07 ENCOUNTER — TELEPHONE (OUTPATIENT)
Dept: HEMATOLOGY ONCOLOGY | Facility: CLINIC | Age: 67
End: 2022-09-07

## 2022-09-07 ENCOUNTER — HOSPITAL ENCOUNTER (OUTPATIENT)
Dept: INFUSION CENTER | Facility: CLINIC | Age: 67
Discharge: HOME | End: 2022-09-07
Payer: MEDICARE

## 2022-09-07 VITALS
WEIGHT: 300.6 LBS | SYSTOLIC BLOOD PRESSURE: 123 MMHG | DIASTOLIC BLOOD PRESSURE: 58 MMHG | BODY MASS INDEX: 37.38 KG/M2 | HEIGHT: 75 IN | HEART RATE: 71 BPM | RESPIRATION RATE: 16 BRPM | TEMPERATURE: 97.5 F

## 2022-09-07 DIAGNOSIS — C82.08 FOLLICULAR LYMPHOMA GRADE I OF LYMPH NODES OF MULTIPLE SITES (HCC): Primary | ICD-10-CM

## 2022-09-07 PROCEDURE — 96409 CHEMO IV PUSH SNGL DRUG: CPT

## 2022-09-07 PROCEDURE — 96367 TX/PROPH/DG ADDL SEQ IV INF: CPT

## 2022-09-07 RX ORDER — SODIUM CHLORIDE 9 MG/ML
20 INJECTION, SOLUTION INTRAVENOUS ONCE
Status: COMPLETED | OUTPATIENT
Start: 2022-09-07 | End: 2022-09-07

## 2022-09-07 RX ADMIN — BENDAMUSTINE HYDROCHLORIDE 234.9 MG: 25 INJECTION, SOLUTION INTRAVENOUS at 14:54

## 2022-09-07 RX ADMIN — DEXAMETHASONE SODIUM PHOSPHATE: 10 INJECTION, SOLUTION INTRAMUSCULAR; INTRAVENOUS at 14:15

## 2022-09-07 RX ADMIN — SODIUM CHLORIDE 20 ML/HR: 0.9 INJECTION, SOLUTION INTRAVENOUS at 14:15

## 2022-09-07 NOTE — PROGRESS NOTES
Pt here for chemotherapy, day 2, offering no complaints  Right  port accessed with positive blood return noted throughout treatment  Tolerated infusion without incident  Port flushed and de-accessed  AVS given  All questions answered  Wang Brooke RN requesting more appts for patient to be made and sent to scheduling desk  Pt is aware    Walked out in stable condition

## 2022-09-08 ENCOUNTER — TELEPHONE (OUTPATIENT)
Dept: HEMATOLOGY ONCOLOGY | Facility: CLINIC | Age: 67
End: 2022-09-08

## 2022-09-08 NOTE — TELEPHONE ENCOUNTER
Spoke with patient  He is aware of all appts  He will check his MyChart for more details  Sonia Bolton, patient says that he as a CT chest scheduled by his cardiologist in October  He then has a CT/ chest/ab/pelvis for Dr Sonia Brock a few weeks later   He wants to know if it's possible for Dr Helene Mills order to be modified to just abdomen/pelvis being that cardiology ordered one for chest

## 2022-09-08 NOTE — TELEPHONE ENCOUNTER
Reviewed with Dr Arminda Torres due to the timing of all the scans it will be too close  Ok to cancel our CT scan per Dr Arminda Torres  I'll call patient to let him know

## 2022-09-08 NOTE — TELEPHONE ENCOUNTER
Reviewed the CT c/a/p not needed at this time Per Dr Danie Yoon  Patient aware and CT scan was cancelled

## 2022-09-13 ENCOUNTER — HOSPITAL ENCOUNTER (OUTPATIENT)
Dept: NON INVASIVE DIAGNOSTICS | Facility: CLINIC | Age: 67
Discharge: HOME | End: 2022-09-13
Payer: MEDICARE

## 2022-09-13 VITALS
BODY MASS INDEX: 37.3 KG/M2 | WEIGHT: 300 LBS | HEART RATE: 77 BPM | OXYGEN SATURATION: 99 % | SYSTOLIC BLOOD PRESSURE: 144 MMHG | HEIGHT: 75 IN | DIASTOLIC BLOOD PRESSURE: 74 MMHG

## 2022-09-13 DIAGNOSIS — R07.9 CHEST PAIN, UNSPECIFIED TYPE: ICD-10-CM

## 2022-09-13 LAB
BASELINE ST DEPRESSION: 0 MM
NUC STRESS DIASTOLIC VOLUME INDEX: 98 ML/M2
NUC STRESS EJECTION FRACTION: 51 %
NUC STRESS SYSTOLIC VOLUME INDEX: 48 ML/M2
RATE PRESSURE PRODUCT: NORMAL
SL CV REST NUCLEAR ISOTOPE DOSE: 15.51 MCI
SL CV STRESS NUCLEAR ISOTOPE DOSE: 48.9 MCI
SL CV STRESS RECOVERY BP: NORMAL MMHG
SL CV STRESS RECOVERY HR: 90 BPM
SL CV STRESS RECOVERY O2 SAT: 99 %
STRESS ANGINA INDEX: 0
STRESS BASELINE BP: NORMAL MMHG
STRESS BASELINE HR: 77 BPM
STRESS O2 SAT REST: 99 %
STRESS PEAK HR: 105 BPM
STRESS POST O2 SAT PEAK: 97 %
STRESS POST PEAK BP: 148 MMHG
STRESS ST DEPRESSION: 0 MM
STRESS/REST PERFUSION RATIO: 0.95

## 2022-09-13 PROCEDURE — A9502 TC99M TETROFOSMIN: HCPCS

## 2022-09-13 PROCEDURE — 78452 HT MUSCLE IMAGE SPECT MULT: CPT

## 2022-09-13 PROCEDURE — 93017 CV STRESS TEST TRACING ONLY: CPT

## 2022-09-13 PROCEDURE — G1004 CDSM NDSC: HCPCS

## 2022-09-13 PROCEDURE — 93016 CV STRESS TEST SUPVJ ONLY: CPT | Performed by: INTERNAL MEDICINE

## 2022-09-13 PROCEDURE — 78452 HT MUSCLE IMAGE SPECT MULT: CPT | Performed by: INTERNAL MEDICINE

## 2022-09-13 PROCEDURE — 93018 CV STRESS TEST I&R ONLY: CPT | Performed by: INTERNAL MEDICINE

## 2022-09-13 RX ADMIN — REGADENOSON 0.4 MG: 0.08 INJECTION, SOLUTION INTRAVENOUS at 13:13

## 2022-09-14 LAB
CHEST PAIN STATEMENT: NORMAL
MAX DIASTOLIC BP: 76 MMHG
MAX HEART RATE: 105 BPM
MAX PREDICTED HEART RATE: 155 BPM
MAX. SYSTOLIC BP: 148 MMHG
PROTOCOL NAME: NORMAL
REASON FOR TERMINATION: NORMAL
TARGET HR FORMULA: NORMAL
TEST INDICATION: NORMAL
TIME IN EXERCISE PHASE: NORMAL

## 2022-09-27 NOTE — PROGRESS NOTES
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     Hem/Onc Problem List:     1  Follicular lymphoma of intra-abdominal lymph nodes, unspecified follicular lymphoma type [facility])    Chief Complaint:    Follow up between chemotherapy treatments     Assessment/Plan:   1  Follicular Lymphoma, WHO grade 1-2, initially diagnosed 07/8/2021  Patient was on observation and had no constitutional symptoms  Recently, he developed complaint of chest pain likely related to epigastric pain with burning sensation  He then began having discomfort in the abdomen in other areas  Also began having diarrhea  He had extensive workup from GI which was unrevealing  GI physician spoke with my attending who believes symptoms could be related to his follicular lymphoma  As a result, treatment was warranted  Patient started BR treatment Q 28 days on September 6, 2022  · Discussion of decision making    I personally reviewed the following lab results, the image studies, pathology, other specialty/physicians consult notes and recommendations, and outside medical records from [address] [place] [address]  I had a lengthy discussion with the patient and shared the work-up findings  I spent 30 minutes reviewing the records (labs, clinician notes, outside records, medical history, ordering medicine/tests/procedures, interpreting the imaging/labs previously done) and coordination of care as well as direct time with the patient today, of which greater than 50% of the time was spent in counseling and coordination of care with the patient/family  · Plan/Labs  · Patient is status post 1 cycle of BR 28 day treatment  Tolerated 1st treatment well with improvement in his GI symptoms    Current ECOG is 0   · Cycle 2, day 1 is scheduled for 10/04/2022  Patient will have lab work prior to this treatment  We will follow this  · Port in place  · Treatment plan:  · Bendamustine 90mg/m2 D1, D2 + Rituxan 375mg/m2 D1 Q28 days  · Supportive therapies:   · Allopurinol 300mg TID  · Zofran 8mg Q8 hours PRN for N/V (has not required thus far)  · EMLA cream 30 minutes prior to port access  · Next imaging for restaging will be after all treatments have completed  We will order a PET scan after treatment has finished  Will order this in upcoming visits  Follow Up:  1 month with Dr Sonia Brock     All questions were answered to the patient's satisfaction during this encounter  The patient knows the contact information for our office and knows to reach out for any relevant concerns related to this encounter  They are to call for any temperature 100 4 or higher, new symptoms including but not restricted to shaking chills, decreased appetite, nausea, vomiting, diarrhea, increased fatigue, shortness of breath or chest pain, confusion, and not feeling the strength to come to the clinic  For all other listed problems and medical diagnosis in their chart - they are managed by PCP and/or other specialists, which the patient acknowledges  Thank you very much for your consultation and making us a part of this patient's care  We are continuing to follow closely with you  Please do not hesitate to reach out to me with any additional questions or concerns  AJCC 8th Edition Cancer Stage :      Cancer Staging  No matching staging information was found for the patient  Hematology/Oncology History:   - incidentally diagnosed while doing imaging for aortic aneurysm, showed retroperitoneum and mesenteric lymphadenopathy, largest lymph node about 3 cm, status post biopsy showed follicular lymphoma; grade 1-2;   - 7/2021 :  Developed a big hematoma post biopsy   Hospitalized   Hemoglobin dropped to 7 8 then stable  -  8/2021 :  Hb recovered  PET showed moderate tumor burden  Does not fit GELF criteria for treatment; FLIPI score = 2, age +  Stage 3  PET CT showed low SUV  Decided to observe  - Feb 28, 2022 US Head and neck:Patient's lump in the right submandibular region corresponds to an enlarged, heterogeneous submandibular lymph node measuring 1 8 x 1 1 x 1 9 cm (image 18 ) Review of the prior PET/CT from 8/10/2021 demonstrate an enlarged, hypermetabolic node in this area, therefore this is likely part of the patient's known follicular lymphoma  Difficult to assess interval change because of the difference in imaging modality  - May 12, 2022, CT CAP w/o c with interval increase in mesenteric lymph nodes, new pulmonary nodules  Conglomerate kennedy mass at the root of the small bowel mesentery on image 82 of series 2 measures approximately 12 0 x 7 5 cm, increased from 8 9 x 6 3 cm when measured using similar technique on July 10, 2021   Discrete upper retroperitoneal nodes are slightly increased from July 10, 2021, specifically an aortocaval node measuring 2 9 x 2 3 cm on image 74 of series 2, increased from 2 2 x 2 0 cm on prior exam and an anterior left periaortic node measuring 2 4 x 2 0 cm on image 76 of series 2 increased from 2 1 x 1 7 cm on prior examination    · August 6, 2022, CT abd, pelvis w/o contrast:  · 8 6 cm low-attenuation focus within the spleen which is not present on CT examination of 5/12/2022  Differential considerations include splenic abscess or progression of patient's follicular lymphoma  This finding can be further evaluated with contrast-enhanced MR abdomen  · Progression of upper abdominal lymphadenopathy  Mesenteric and retroperitoneal lymph nodes kennedy burden appears similar to 5/12/2022  · New 2 6 cm cystic focus immediately deep to the umbilicus likely to represent additional disease spread  · August 8, 2022, patient had an EGD showing erythema in antrum  Small proximal 1 cm sliding hiatal hernia  Otherwise unrevealing findings  Biopsies were benign  · August 19, 2022, chronic hepatitis panel was negative  · September 6th, 2022 started cycle 1, day 1 of Bendamustine + Rituxan Q28 days  History of Present Illiness:   Erica Meade is a 78 y o  male with the above-noted HemOnc history who is here  to follow-up regarding history of follicular lymphoma  Patient has been on having ongoing GI complaints  Patient has history of GERD, chest pain likely related to GERD  He also has been having decreased appetite causing some weight loss  He still has sensation of upset stomach  GI physicians spoke with my attending who believes his symptoms could be related to his follicular lymphoma  Patient was started on BR treatment Q 28 days September 6, 2022  Interval history:  09/28/2022:  Patient states he is tolerated treatment very well  He notes that most of his GI complaints have resolved  Gastric reflux, abdominal pain have subsided  He started tapering off Protonix, Carafate medications and has not noticed recurrence of his symptoms  He will also let his GI physician know that he is tapering himself off these medications  No fever or chills  No exertional chest pain, diaphoresis or shortness  of breath  No cough and phlegm, no hemoptysis  Patient denied nausea  and vomiting  No weight loss  Improvement in his abdominal pain and gastric reflux symptoms  No  symptoms  No headache or blurred vision  No seizure activity  Appetite good  No significant weight gain  The patient denies bleeding anywhere  ROS: A 12-point of review of systems is obtained and other than the above is noncontributory       Objective:   VITALS:   /80 (BP Location: Left arm, Patient Position: Sitting, Cuff Size: Adult)   Pulse 79   Temp 97 9 °F (36 6 °C)   Resp 18   Ht 6' 3" (1 905 m)   Wt 136 kg (300 lb)   SpO2 98%   BMI 37 50 kg/m²     Physical EXAM:  General:  Alert, cooperative, no distress, appears stated age  Head:  Normocephalic, without obvious abnormality, atraumatic  Eyes:  Conjunctivae/corneas clear  PERRL, EOMs intact  No evidence of conjunctivitis     Throat: Lips, mucosa, and tongue normal   No bleeding from mouth  Neck: Supple, symmetrical, trachea midline    Lungs:    Respiratory effort easy, nonlabored    Heart:  Regular rate and rhythm, S1, S2 normal    Abdomen:   Soft, non-tender,nondistended  No masses,  No organomegaly  Extremities:  Lymphatics: Extremities normal, atraumatic, no cyanosis or edema  No cervical, axillary or inguinal adenopathy   Skin: Skin color, texture, turgor normal  No rashes  Neurologic: A&Ox4   No focal neuro deficits       Allergies   Allergen Reactions    Lisinopril Cough       Past Medical History:   Diagnosis Date    Chronic kidney disease     [facility]     Follicular lymphoma ([facility] )     High cholesterol     Hypertension        Past Surgical History:   Procedure Laterality Date    BUNIONECTOMY Right 11/24/2020    Procedure: RIGHT HAV CORRECTION,;  Surgeon: Myles Guerrero DPM;  [facility];  Service: Podiatry    COLONOSCOPY      INCISION AND DRAINAGE OF WOUND Right 10/05/2016    Procedure: INCISION AND DRAINAGE (I&D) EXTREMITY;  Surgeon: Myles Guerrero DPM;  [facility];  Service:     IR BIOPSY LYMPH NODE  07/08/2021    IR EMBOLIZATION (SPECIFY VESSEL OR SITE)  07/08/2021    IR PORT PLACEMENT  9/1/2022    PILONIDAL CYST EXCISION      DC REPAIR OF HAMMERTOE,ONE Right 02/19/2019    Procedure: THIRD HAMMER TOE CORRECTION;  Surgeon: Myles Guerrero DPM;  [facility];  Service: Podiatry    TOE OSTEOTOMY Right 03/14/2017    Procedure: HAMMERTOE CORRECTION R 2 ;  Surgeon: Myles Guerrero DPM;  [facility];  Service:     TOE OSTEOTOMY Left 11/24/2020    Procedure: LEFT HT CORRECTION TOE;  Surgeon: Myles Guerrero DPM;  [facility];  Service: [id number]       Family History Problem Relation Age of Onset    Cancer Father         Leukemia       Social History     Socioeconomic History    Marital status: /Civil Union     Spouse name: Not on file    Number of children: Not on file    Years of education: Not on file    Highest education level: Not on file   Occupational History    Not on file   Tobacco Use    Smoking status: Never Smoker    Smokeless tobacco: Never Used    Tobacco comment: Never smoked but exposed to second hand smoke from birth until 19's   Vaping Use    Vaping Use: Never used   Substance and Sexual Activity    Alcohol use: No    Drug use: No    Sexual activity: Not Currently     Partners: Female     Birth control/protection: Abstinence   Other Topics Concern    Not on file   Social History Narrative    Not on file     Social Determinants of Health     Financial Resource Strain: Not on file   Food Insecurity: Not on file   Transportation Needs: Not on file   Physical Activity: Not on file   Stress: Not on file   Social Connections: Not on file   Intimate Partner Violence: Not on file   Housing Stability: Not on file       Current Outpatient Medications   Medication Sig Dispense Refill    allopurinol (ZYLOPRIM) 100 mg tablet Take 3 tablets (300 mg total) by mouth daily 30 tablet 1    amLODIPine (NORVASC) 10 mg tablet take 1 tablet by mouth once daily AT NOON      aspirin 81 mg chewable tablet Chew 81 mg daily      famotidine (PEPCID) 40 MG tablet Take 1 tablet (40 mg total) by mouth daily at bedtime 30 tablet 3    fluticasone (FLONASE) 50 mcg/act nasal spray instill 2 sprays into each nostril one to two times a day      hydrochlorothiazide (HYDRODIURIL) 25 mg tablet Take 25 mg by mouth daily      lidocaine-prilocaine (EMLA) cream Apply to port area 30 minutes prior to access 30 g 0    losartan (COZAAR) 100 MG tablet Take 0 5 tablets (50 mg total) by mouth daily (Patient taking differently: Take 100 mg by mouth daily)      metFORMIN (GLUCOPHAGE) 500 mg tablet Take 1,000 mg by mouth 2 (two) times a day with meals      metoprolol succinate (TOPROL-XL) 50 mg 24 hr tablet Take 50 mg by mouth every evening      Multiple Vitamin (multivitamin) tablet Take 1 tablet by mouth daily      Multiple Vitamins-Minerals (Centrum Silver 50+Men) TABS       ondansetron (ZOFRAN) 8 mg tablet Take 1 tablet (8 mg total) by mouth every 8 (eight) hours as needed for nausea or vomiting 30 tablet 2    pantoprazole (PROTONIX) 40 mg tablet Take 1 tablet (40 mg total) by mouth 2 (two) times a day before meals 60 tablet 3    predniSONE 20 mg tablet Take by mouth daily (Patient not taking: No sig reported)      simvastatin (ZOCOR) 40 mg tablet Take 40 mg by mouth daily at bedtime      sucralfate (CARAFATE) 1 g/10 mL suspension Take 10 mL (1 g total) by mouth 4 (four) times a day as needed (abdominal pain) 1200 mL 1     No current facility-administered medications for this visit  (Not in a hospital admission)      DATA REVIEW:    Pathology Result:    Final Diagnosis   Date Value Ref Range Status   08/15/2022   Final    A  Stomach, linear antral erythema:  - Gastric antral mucosa with mild chronic active gastritis and regenerative epithelial changes   - No Helicobacter pylori organisms are identified with immunoperoxidase stain   - Negative for intestinal metaplasia, dysplasia or carcinoma  B  Esophagus, irregular z line 39:  - Squamocolumnar junction mucosa with mild chronic inflammation and regenerative epithelial changes   - No intestinal metaplasia/ dysplasia identified  C  Esophagogastric junction, small nodule ge junction:  - Fragments of esophageal squamous and gastric glandular mucosa with intestinal metaplasia and regenerative epithelial changes  - Intestinal metaplasia identified with Alcian blue/PAS stain   - Pan keratin stain highlights benign epithelial elements   - No dysplasia identified  See note      Note (C): This biopsy shows gastric-type mucosa with scattered goblet cells  The diagnosis in this case depends on the location of this biopsy  If this biopsy was taken from the tubular esophagus at least 1 cm above the gastric folds, it represents Ozuna mucosa of the distinctive type  If this biopsy was taken from the gastric cardia, it represents intestinal metaplasia of the gastric cardia  Reference: Fernandez ODOM; [employer] of Gastroenterology  [facility] Clinical Guideline: Diagnosis and Management of Ozuna's Esophagus  Am Tina Fass  2016 Jan;[id number]      07/08/2021   Final    A  Lymph node, periaortic, biopsy:  -  Follicular lymphoma, WHO grade 1-2 (of 3) (see note)       06/28/2021   Final    A  Duodenum:  - Small bowel mucosa with no significant histopathologic abnormality   - Negative for malabsorption pattern  - Negative for malignancy  B  Stomach:  - Gastric mucosa with no significant histopathologic abnormality   - Negative for H  pylori by routine H&E   - Negative for malignancy  C  Esophagus, distal:  - Squamocolumnar mucosa with intestinal metaplasia  See comment   - Negative for dysplasia and malignancy  Comment: The diagnosis in this case depends on the location of this biopsy  If this biopsy was taken from the tubular esophagus at least 1 cm above the gastric folds, it represents Ozuna mucosa  If this biopsy was taken from the gastric cardia, it represents intestinal metaplasia of the gastric cardia  Image Results: They are reviewed and documented in Hematology/Oncology history    Stress strip  Confirmed by Ventura Velarde ([id number]),  Jaron Partida ([id number]) on 9/14/2022 10:15:56 AM        LABS:  Lab data are reviewed and documented in HemOnc history  No results found for this or any previous visit (from the past 48 hour(s))            Juancarlos Greco PA-C  9/28/2022, 1:34 PM

## 2022-09-28 ENCOUNTER — OFFICE VISIT (OUTPATIENT)
Dept: HEMATOLOGY ONCOLOGY | Facility: CLINIC | Age: 67
End: 2022-09-28
Payer: MEDICARE

## 2022-09-28 VITALS
TEMPERATURE: 97.9 F | HEIGHT: 75 IN | HEART RATE: 79 BPM | OXYGEN SATURATION: 98 % | WEIGHT: 300 LBS | SYSTOLIC BLOOD PRESSURE: 122 MMHG | BODY MASS INDEX: 37.3 KG/M2 | DIASTOLIC BLOOD PRESSURE: 80 MMHG | RESPIRATION RATE: 18 BRPM

## 2022-09-28 DIAGNOSIS — C48.1: ICD-10-CM

## 2022-09-28 DIAGNOSIS — C82.08 FOLLICULAR LYMPHOMA GRADE I OF LYMPH NODES OF MULTIPLE SITES (HCC): Primary | ICD-10-CM

## 2022-09-28 PROCEDURE — 99214 OFFICE O/P EST MOD 30 MIN: CPT | Performed by: INTERNAL MEDICINE

## 2022-09-28 RX ORDER — SODIUM CHLORIDE 9 MG/ML
20 INJECTION, SOLUTION INTRAVENOUS ONCE
Status: CANCELLED | OUTPATIENT
Start: 2022-10-05

## 2022-09-30 ENCOUNTER — APPOINTMENT (OUTPATIENT)
Dept: LAB | Facility: HOSPITAL | Age: 67
End: 2022-09-30
Payer: MEDICARE

## 2022-09-30 DIAGNOSIS — C82.08 FOLLICULAR LYMPHOMA GRADE I OF LYMPH NODES OF MULTIPLE SITES (HCC): ICD-10-CM

## 2022-09-30 LAB
ALBUMIN SERPL BCP-MCNC: 3.7 G/DL (ref 3.5–5)
ALP SERPL-CCNC: 68 U/L (ref 46–116)
ALT SERPL W P-5'-P-CCNC: 42 U/L (ref 12–78)
ANION GAP SERPL CALCULATED.3IONS-SCNC: 8 MMOL/L (ref 4–13)
AST SERPL W P-5'-P-CCNC: 23 U/L (ref 5–45)
BASOPHILS # BLD AUTO: 0.04 THOUSANDS/ΜL (ref 0–0.1)
BASOPHILS NFR BLD AUTO: 1 % (ref 0–1)
BILIRUB SERPL-MCNC: 0.38 MG/DL (ref 0.2–1)
BUN SERPL-MCNC: 25 MG/DL (ref 5–25)
CALCIUM SERPL-MCNC: 9.4 MG/DL (ref 8.3–10.1)
CHLORIDE SERPL-SCNC: 103 MMOL/L (ref 96–108)
CO2 SERPL-SCNC: 30 MMOL/L (ref 21–32)
CREAT SERPL-MCNC: 1.16 MG/DL (ref 0.6–1.3)
EOSINOPHIL # BLD AUTO: 0.35 THOUSAND/ΜL (ref 0–0.61)
EOSINOPHIL NFR BLD AUTO: 7 % (ref 0–6)
ERYTHROCYTE [DISTWIDTH] IN BLOOD BY AUTOMATED COUNT: 19.6 % (ref 11.6–15.1)
GFR SERPL CREATININE-BSD FRML MDRD: 65 ML/MIN/1.73SQ M
GLUCOSE P FAST SERPL-MCNC: 139 MG/DL (ref 65–99)
HCT VFR BLD AUTO: 37.1 % (ref 36.5–49.3)
HGB BLD-MCNC: 11.3 G/DL (ref 12–17)
IMM GRANULOCYTES # BLD AUTO: 0.04 THOUSAND/UL (ref 0–0.2)
IMM GRANULOCYTES NFR BLD AUTO: 1 % (ref 0–2)
LYMPHOCYTES # BLD AUTO: 0.36 THOUSANDS/ΜL (ref 0.6–4.47)
LYMPHOCYTES NFR BLD AUTO: 7 % (ref 14–44)
MCH RBC QN AUTO: 25.8 PG (ref 26.8–34.3)
MCHC RBC AUTO-ENTMCNC: 30.5 G/DL (ref 31.4–37.4)
MCV RBC AUTO: 85 FL (ref 82–98)
MONOCYTES # BLD AUTO: 0.82 THOUSAND/ΜL (ref 0.17–1.22)
MONOCYTES NFR BLD AUTO: 15 % (ref 4–12)
NEUTROPHILS # BLD AUTO: 3.72 THOUSANDS/ΜL (ref 1.85–7.62)
NEUTS SEG NFR BLD AUTO: 69 % (ref 43–75)
NRBC BLD AUTO-RTO: 0 /100 WBCS
PLATELET # BLD AUTO: 150 THOUSANDS/UL (ref 149–390)
PMV BLD AUTO: 10.2 FL (ref 8.9–12.7)
POTASSIUM SERPL-SCNC: 4.6 MMOL/L (ref 3.5–5.3)
PROT SERPL-MCNC: 7.6 G/DL (ref 6.4–8.4)
RBC # BLD AUTO: 4.38 MILLION/UL (ref 3.88–5.62)
SODIUM SERPL-SCNC: 141 MMOL/L (ref 135–147)
WBC # BLD AUTO: 5.33 THOUSAND/UL (ref 4.31–10.16)

## 2022-09-30 PROCEDURE — 85025 COMPLETE CBC W/AUTO DIFF WBC: CPT

## 2022-09-30 PROCEDURE — 36415 COLL VENOUS BLD VENIPUNCTURE: CPT

## 2022-09-30 PROCEDURE — 80053 COMPREHEN METABOLIC PANEL: CPT

## 2022-10-04 ENCOUNTER — HOSPITAL ENCOUNTER (OUTPATIENT)
Dept: INFUSION CENTER | Facility: CLINIC | Age: 67
Discharge: HOME | End: 2022-10-04
Payer: MEDICARE

## 2022-10-04 VITALS
HEART RATE: 81 BPM | TEMPERATURE: 97 F | DIASTOLIC BLOOD PRESSURE: 77 MMHG | BODY MASS INDEX: 37.18 KG/M2 | RESPIRATION RATE: 17 BRPM | HEIGHT: 75 IN | SYSTOLIC BLOOD PRESSURE: 122 MMHG | WEIGHT: 299 LBS

## 2022-10-04 DIAGNOSIS — C82.08 FOLLICULAR LYMPHOMA GRADE I OF LYMPH NODES OF MULTIPLE SITES (HCC): Primary | ICD-10-CM

## 2022-10-04 PROCEDURE — 96413 CHEMO IV INFUSION 1 HR: CPT

## 2022-10-04 PROCEDURE — 96367 TX/PROPH/DG ADDL SEQ IV INF: CPT

## 2022-10-04 PROCEDURE — 96415 CHEMO IV INFUSION ADDL HR: CPT

## 2022-10-04 PROCEDURE — 96411 CHEMO IV PUSH ADDL DRUG: CPT

## 2022-10-04 RX ORDER — SODIUM CHLORIDE 9 MG/ML
20 INJECTION, SOLUTION INTRAVENOUS ONCE
Status: COMPLETED | OUTPATIENT
Start: 2022-10-04 | End: 2022-10-04

## 2022-10-04 RX ORDER — ACETAMINOPHEN 325 MG/1
650 TABLET ORAL ONCE
Status: COMPLETED | OUTPATIENT
Start: 2022-10-04 | End: 2022-10-04

## 2022-10-04 RX ADMIN — DIPHENHYDRAMINE HYDROCHLORIDE 25 MG: 50 INJECTION, SOLUTION INTRAMUSCULAR; INTRAVENOUS at 09:08

## 2022-10-04 RX ADMIN — DEXAMETHASONE SODIUM PHOSPHATE: 10 INJECTION, SOLUTION INTRAMUSCULAR; INTRAVENOUS at 09:33

## 2022-10-04 RX ADMIN — BENDAMUSTINE HYDROCHLORIDE 234.9 MG: 25 INJECTION, SOLUTION INTRAVENOUS at 13:40

## 2022-10-04 RX ADMIN — SODIUM CHLORIDE 20 ML/HR: 9 INJECTION, SOLUTION INTRAVENOUS at 09:08

## 2022-10-04 RX ADMIN — ACETAMINOPHEN 650 MG: 325 TABLET, FILM COATED ORAL at 09:08

## 2022-10-04 RX ADMIN — RITUXIMAB 978.8 MG: 10 INJECTION, SOLUTION INTRAVENOUS at 10:11

## 2022-10-05 ENCOUNTER — HOSPITAL ENCOUNTER (OUTPATIENT)
Dept: INFUSION CENTER | Facility: CLINIC | Age: 67
Discharge: HOME | End: 2022-10-05
Payer: MEDICARE

## 2022-10-05 VITALS
TEMPERATURE: 97.6 F | SYSTOLIC BLOOD PRESSURE: 141 MMHG | HEIGHT: 75 IN | WEIGHT: 308.2 LBS | RESPIRATION RATE: 18 BRPM | DIASTOLIC BLOOD PRESSURE: 74 MMHG | HEART RATE: 78 BPM | BODY MASS INDEX: 38.32 KG/M2

## 2022-10-05 DIAGNOSIS — C82.08 FOLLICULAR LYMPHOMA GRADE I OF LYMPH NODES OF MULTIPLE SITES (HCC): Primary | ICD-10-CM

## 2022-10-05 PROCEDURE — 96367 TX/PROPH/DG ADDL SEQ IV INF: CPT

## 2022-10-05 PROCEDURE — 96409 CHEMO IV PUSH SNGL DRUG: CPT

## 2022-10-05 RX ORDER — SODIUM CHLORIDE 9 MG/ML
20 INJECTION, SOLUTION INTRAVENOUS ONCE
Status: COMPLETED | OUTPATIENT
Start: 2022-10-05 | End: 2022-10-05

## 2022-10-05 RX ADMIN — BENDAMUSTINE HYDROCHLORIDE 234.9 MG: 25 INJECTION, SOLUTION INTRAVENOUS at 10:10

## 2022-10-05 RX ADMIN — SODIUM CHLORIDE 20 ML/HR: 9 INJECTION, SOLUTION INTRAVENOUS at 09:38

## 2022-10-05 RX ADMIN — DEXAMETHASONE SODIUM PHOSPHATE: 10 INJECTION, SOLUTION INTRAMUSCULAR; INTRAVENOUS at 09:38

## 2022-10-05 NOTE — PROGRESS NOTES
Patient presents for day 2 bendeka offering no complaints, patient tolerated treatment without incident  AVS declined  Next appointment reviewed

## 2022-10-25 DIAGNOSIS — C82.08 FOLLICULAR LYMPHOMA GRADE I OF LYMPH NODES OF MULTIPLE SITES (HCC): Primary | ICD-10-CM

## 2022-10-25 RX ORDER — ACETAMINOPHEN 325 MG/1
650 TABLET ORAL ONCE
Status: CANCELLED | OUTPATIENT
Start: 2022-11-01

## 2022-10-25 RX ORDER — SODIUM CHLORIDE 9 MG/ML
20 INJECTION, SOLUTION INTRAVENOUS ONCE
Status: CANCELLED | OUTPATIENT
Start: 2022-11-01

## 2022-10-25 RX ORDER — SODIUM CHLORIDE 9 MG/ML
20 INJECTION, SOLUTION INTRAVENOUS ONCE
Status: CANCELLED | OUTPATIENT
Start: 2022-11-02

## 2022-10-27 NOTE — PROGRESS NOTES
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     Hem/Onc Problem List:     1  Follicular lymphoma of intra-abdominal lymph nodes, unspecified follicular lymphoma type [facility])    Chief Complaint:    Follow up between chemotherapy treatments     Assessment/Plan:   1  Follicular Lymphoma, WHO grade 1-2, initially diagnosed 07/8/2021  Patient was on observation and had no constitutional symptoms  Recently, he developed complaint of chest pain likely related to epigastric pain with burning sensation  He then began having discomfort in the abdomen in other areas  Also began having diarrhea  He had extensive workup from GI which was unrevealing  GI physician spoke with my attending who believes symptoms could be related to his follicular lymphoma  As a result, treatment was warranted  Patient started BR treatment Q 28 days on September 6, 2022  · Discussion of decision making    I personally reviewed the following lab results, the image studies, pathology, other specialty/physicians consult notes and recommendations, and outside medical records from [address] [place] [address]  I had a lengthy discussion with the patient and shared the work-up findings  I spent 30 minutes reviewing the records (labs, clinician notes, outside records, medical history, ordering medicine/tests/procedures, interpreting the imaging/labs previously done) and coordination of care as well as direct time with the patient today, of which greater than 50% of the time was spent in counseling and coordination of care with the patient/family  · Plan/Labs  · Patient is status post 3 cycle of BR 28 day treatment  Current ECOG is still 0   · Cycle 3, day 1 is scheduled for 11/1/2022    Patient will have lab work prior to this treatment  Will follow these results  · Port in place  · Treatment plan:  · Bendamustine 90mg/m2 D1, D2 + Rituxan 375mg/m2 D1 Q28 days  · Supportive therapies:   · Allopurinol 300mg TID  · Zofran 8mg Q8 hours PRN for N/V (has not required thus far)  · EMLA cream 30 minutes prior to port access  · Will order PET scan to be done after completion of all cycles  Will order this to be done 1st week of February as C6D1 is 1/24/23  Follow Up: as scheduled with Dr Michi Cortez 11/30/22  All questions were answered to the patient's satisfaction during this encounter  The patient knows the contact information for our office and knows to reach out for any relevant concerns related to this encounter  They are to call for any temperature 100 4 or higher, new symptoms including but not restricted to shaking chills, decreased appetite, nausea, vomiting, diarrhea, increased fatigue, shortness of breath or chest pain, confusion, and not feeling the strength to come to the clinic  For all other listed problems and medical diagnosis in their chart - they are managed by PCP and/or other specialists, which the patient acknowledges  Thank you very much for your consultation and making us a part of this patient's care  We are continuing to follow closely with you  Please do not hesitate to reach out to me with any additional questions or concerns  AJCC 8th Edition Cancer Stage :      Cancer Staging  No matching staging information was found for the patient  Hematology/Oncology History:   - incidentally diagnosed while doing imaging for aortic aneurysm, showed retroperitoneum and mesenteric lymphadenopathy, largest lymph node about 3 cm, status post biopsy showed follicular lymphoma; grade 1-2;   - 7/2021 :  Developed a big hematoma post biopsy   Hospitalized   Hemoglobin dropped to 7 8 then stable  -  8/2021 : Hb recovered  PET showed moderate tumor burden   Does not fit GELF criteria for treatment; FLIPI score = 2, age +  Stage 3  PET CT showed low SUV  Decided to observe  - Feb 28, 2022 US Head and neck:Patient's lump in the right submandibular region corresponds to an enlarged, heterogeneous submandibular lymph node measuring 1 8 x 1 1 x 1 9 cm (image 18 ) Review of the prior PET/CT from 8/10/2021 demonstrate an enlarged, hypermetabolic node in this area, therefore this is likely part of the patient's known follicular lymphoma  Difficult to assess interval change because of the difference in imaging modality  - May 12, 2022, CT CAP w/o c with interval increase in mesenteric lymph nodes, new pulmonary nodules  Conglomerate kennedy mass at the root of the small bowel mesentery on image 82 of series 2 measures approximately 12 0 x 7 5 cm, increased from 8 9 x 6 3 cm when measured using similar technique on July 10, 2021   Discrete upper retroperitoneal nodes are slightly increased from July 10, 2021, specifically an aortocaval node measuring 2 9 x 2 3 cm on image 74 of series 2, increased from 2 2 x 2 0 cm on prior exam and an anterior left periaortic node measuring 2 4 x 2 0 cm on image 76 of series 2 increased from 2 1 x 1 7 cm on prior examination    · August 6, 2022, CT abd, pelvis w/o contrast:  · 8 6 cm low-attenuation focus within the spleen which is not present on CT examination of 5/12/2022  Differential considerations include splenic abscess or progression of patient's follicular lymphoma  This finding can be further evaluated with contrast-enhanced MR abdomen  · Progression of upper abdominal lymphadenopathy  Mesenteric and retroperitoneal lymph nodes kennedy burden appears similar to 5/12/2022  · New 2 6 cm cystic focus immediately deep to the umbilicus likely to represent additional disease spread  · August 8, 2022, patient had an EGD showing erythema in antrum  Small proximal 1 cm sliding hiatal hernia  Otherwise unrevealing findings  Biopsies were benign    · August 19, 2022, chronic hepatitis panel was negative  · September 6th, 2022 started cycle 1, day 1 of Bendamustine + Rituxan Q28 days  History of Present Illiness:   Oumou Concepcion is a 78 y o  male with the above-noted HemOnc history who is here  to follow-up regarding history of follicular lymphoma  Patient has been on having ongoing GI complaints  Patient has history of GERD, chest pain likely related to GERD  He also has been having decreased appetite causing some weight loss  He still has sensation of upset stomach  GI physicians spoke with my attending who believes his symptoms could be related to his follicular lymphoma  Patient was started on BR treatment Q 28 days September 6, 2022  Interval history:  10/28/2022:  Patient overall is tolerating treatment very well  He continues to have improvement in abdominal pain, gastric reflux symptoms  He has a good appetite  No new headache, vision change, chest pain, shortness of breath, abdominal pain, nausea/vomiting/diarrhea, bleeding anywhere, leg swelling, constitutional symptoms  No fever or chills  No exertional chest pain, diaphoresis or shortness  of breath  No cough and phlegm, no hemoptysis  Patient denied nausea  and vomiting  No weight loss  Improvement in his abdominal pain and gastric reflux symptoms  No  symptoms  No headache or blurred vision  No seizure activity  Appetite good  No significant weight gain  The patient denies bleeding anywhere  ROS: A 12-point of review of systems is obtained and other than the above is noncontributory  Objective:   VITALS:   /78 (BP Location: Right arm, Patient Position: Sitting, Cuff Size: Large)   Pulse 74   Temp 98 1 °F (36 7 °C) (Temporal)   Resp 18   Ht 6' 3" (1 905 m)   Wt (!) 139 kg (307 lb)   SpO2 96%   BMI 38 37 kg/m²     Physical EXAM:  General:  Alert, cooperative, no distress, appears stated age  Head:  Normocephalic, without obvious abnormality, atraumatic     Eyes: Conjunctivae/corneas clear  PERRL, EOMs intact  No evidence of conjunctivitis     Throat: Lips, mucosa, and tongue normal   No bleeding from mouth  Neck: Supple, symmetrical, trachea midline    Lungs:    Respiratory effort easy, nonlabored    Heart:  Regular rate and rhythm, S1, S2 normal    Abdomen:   Soft, non-tender,nondistended  No masses,  No organomegaly  Extremities:  Lymphatics: Extremities normal, atraumatic, no cyanosis or edema  No cervical, axillary or inguinal adenopathy   Skin: Skin color, texture, turgor normal  No rashes  Neurologic: A&Ox4   No focal neuro deficits       Allergies   Allergen Reactions   • Lisinopril Cough       Past Medical History:   Diagnosis Date   • Chronic kidney disease    • Diabetes mellitus ([facility] [address] )    • Follicular lymphoma ([facility] [address] )    • High cholesterol    • Hypertension        Past Surgical History:   Procedure Laterality Date   • BUNIONECTOMY Right 11/24/2020    Procedure: RIGHT HAV CORRECTION,;  Surgeon: Kirk Sweeney DPM;  [facility];  Service: Podiatry   • COLONOSCOPY     • INCISION AND DRAINAGE OF WOUND Right 10/05/2016    Procedure: INCISION AND DRAINAGE (I&D) EXTREMITY;  Surgeon: Kirk Sweeney DPM;  Location[facility];  Service:    • IR BIOPSY LYMPH NODE  07/08/2021   • IR EMBOLIZATION (SPECIFY VESSEL OR SITE)  07/08/2021   • IR PORT PLACEMENT  9/1/2022   • PILONIDAL CYST EXCISION     • DC REPAIR OF HAMMERTOE,ONE Right 02/19/2019    Procedure: THIRD HAMMER TOE CORRECTION;  Surgeon: Kirk Sweeney DPM;  [facility];  Service: Podiatry   • TOE OSTEOTOMY Right 03/14/2017    Procedure: HAMMERTOE CORRECTION R 2 ;  Surgeon: Kirk Sweeney DPM;  [facility];  Service:    • TOE OSTEOTOMY Left 11/24/2020    Procedure: LEFT HT CORRECTION TOE;  Surgeon: Kirk Sweeney DPM;  [facility];  Service: Podiatry   • TONSILLECTOMY  1963       Family History   Problem Relation Age of Onset   • Cancer Father         Leukemia       Social History Socioeconomic History   • Marital status: /Civil Union     Spouse name: Not on file   • Number of children: Not on file   • Years of education: Not on file   • Highest education level: Not on file   Occupational History   • Not on file   Tobacco Use   • Smoking status: Never Smoker   • Smokeless tobacco: Never Used   • Tobacco comment: Never smoked but exposed to second hand smoke from birth until 19's   Vaping Use   • Vaping Use: Never used   Substance and Sexual Activity   • Alcohol use: No   • Drug use: No   • Sexual activity: Not Currently     Partners: Female     Birth control/protection: Abstinence   Other Topics Concern   • Not on file   Social History Narrative   • Not on file     Social Determinants of Health     Financial Resource Strain: Not on file   Food Insecurity: Not on file   Transportation Needs: Not on file   Physical Activity: Not on file   Stress: Not on file   Social Connections: Not on file   Intimate Partner Violence: Not on file   Housing Stability: Not on file       Current Outpatient Medications   Medication Sig Dispense Refill   • amLODIPine (NORVASC) 10 mg tablet take 1 tablet by mouth once daily AT NOON     • aspirin 81 mg chewable tablet Chew 81 mg daily     • famotidine (PEPCID) 40 MG tablet Take 1 tablet (40 mg total) by mouth daily at bedtime 30 tablet 3   • fluticasone (FLONASE) 50 mcg/act nasal spray instill 2 sprays into each nostril one to two times a day     • hydrochlorothiazide (HYDRODIURIL) 25 mg tablet Take 25 mg by mouth daily     • lidocaine-prilocaine (EMLA) cream Apply to port area 30 minutes prior to access 30 g 0   • losartan (COZAAR) 100 MG tablet Take 0 5 tablets (50 mg total) by mouth daily (Patient taking differently: Take 100 mg by mouth daily)     • metFORMIN (GLUCOPHAGE) 500 mg tablet Take 1,000 mg by mouth 2 (two) times a day with meals     • metoprolol succinate (TOPROL-XL) 100 mg 24 hr tablet Take 100 mg by mouth every evening     • metoprolol succinate (TOPROL-XL) 50 mg 24 hr tablet Take 50 mg by mouth every evening     • Multiple Vitamins-Minerals (Centrum Silver 50+Men) TABS      • ondansetron (ZOFRAN) 8 mg tablet Take 1 tablet (8 mg total) by mouth every 8 (eight) hours as needed for nausea or vomiting 30 tablet 2   • simvastatin (ZOCOR) 40 mg tablet Take 40 mg by mouth daily at bedtime     • allopurinol (ZYLOPRIM) 100 mg tablet Take 3 tablets (300 mg total) by mouth daily 30 tablet 1   • Multiple Vitamin (multivitamin) tablet Take 1 tablet by mouth daily     • pantoprazole (PROTONIX) 40 mg tablet Take 1 tablet (40 mg total) by mouth 2 (two) times a day before meals 60 tablet 3   • predniSONE 20 mg tablet Take by mouth daily (Patient not taking: No sig reported)     • sucralfate (CARAFATE) 1 g/10 mL suspension Take 10 mL (1 g total) by mouth 4 (four) times a day as needed (abdominal pain) 1200 mL 1     No current facility-administered medications for this visit  (Not in a hospital admission)      DATA REVIEW:    Pathology Result:    Final Diagnosis   Date Value Ref Range Status   08/15/2022   Final    A  Stomach, linear antral erythema:  - Gastric antral mucosa with mild chronic active gastritis and regenerative epithelial changes   - No Helicobacter pylori organisms are identified with immunoperoxidase stain   - Negative for intestinal metaplasia, dysplasia or carcinoma  B  Esophagus, irregular z line 39:  - Squamocolumnar junction mucosa with mild chronic inflammation and regenerative epithelial changes   - No intestinal metaplasia/ dysplasia identified  C  Esophagogastric junction, small nodule ge junction:  - Fragments of esophageal squamous and gastric glandular mucosa with intestinal metaplasia and regenerative epithelial changes  - Intestinal metaplasia identified with Alcian blue/PAS stain   - Pan keratin stain highlights benign epithelial elements   - No dysplasia identified  See note      Note (C): This biopsy shows gastric-type mucosa with scattered goblet cells  The diagnosis in this case depends on the location of this biopsy  If this biopsy was taken from the tubular esophagus at least 1 cm above the gastric folds, it represents Ozuna mucosa of the distinctive type  If this biopsy was taken from the gastric cardia, it represents intestinal metaplasia of the gastric cardia  Reference: Morenita Dyson LB; [employer] of Gastroenter[facility]  [facility] Clinical Guideline: Diagnosis and Management of Ozuna's Esophagus  Juliana Marr  2016 Jan;[phone]      07/08/2021   Final    A  Lymph node, periaortic, biopsy:  -  Follicular lymphoma, WHO grade 1-2 (of 3) (see note)       06/28/2021   Final    A  Duodenum:  - Small bowel mucosa with no significant histopathologic abnormality   - Negative for malabsorption pattern  - Negative for malignancy  B  Stomach:  - Gastric mucosa with no significant histopathologic abnormality   - Negative for H  pylori by routine H&E   - Negative for malignancy  C  Esophagus, distal:  - Squamocolumnar mucosa with intestinal metaplasia  See comment   - Negative for dysplasia and malignancy  Comment: The diagnosis in this case depends on the location of this biopsy  If this biopsy was taken from the tubular esophagus at least 1 cm above the gastric folds, it represents Ozuna mucosa  If this biopsy was taken from the gastric cardia, it represents intestinal metaplasia of the gastric cardia  Image Results: They are reviewed and documented in Hematology/Oncology history    Stress strip  Confirmed by Marin Friedman ([id number]),  Rachael Bauer ([id number]) on 9/14/2022 10:15:56 AM        LABS:  Lab data are reviewed and documented in HemOnc history  No results found for this or any previous visit (from the past 48 hour(s))            Rehana Brown  10/28/2022, 1:22 PM

## 2022-10-28 ENCOUNTER — OFFICE VISIT (OUTPATIENT)
Dept: HEMATOLOGY ONCOLOGY | Facility: CLINIC | Age: 67
End: 2022-10-28

## 2022-10-28 VITALS
RESPIRATION RATE: 18 BRPM | HEART RATE: 74 BPM | BODY MASS INDEX: 38.17 KG/M2 | TEMPERATURE: 98.1 F | SYSTOLIC BLOOD PRESSURE: 128 MMHG | DIASTOLIC BLOOD PRESSURE: 78 MMHG | OXYGEN SATURATION: 96 % | HEIGHT: 75 IN | WEIGHT: 307 LBS

## 2022-10-28 DIAGNOSIS — Z95.828 PORT-A-CATH IN PLACE: ICD-10-CM

## 2022-10-28 DIAGNOSIS — C48.1: ICD-10-CM

## 2022-10-28 DIAGNOSIS — C82.08 FOLLICULAR LYMPHOMA GRADE I OF LYMPH NODES OF MULTIPLE SITES (HCC): Primary | ICD-10-CM

## 2022-10-28 RX ORDER — METOPROLOL SUCCINATE 100 MG/1
100 TABLET, EXTENDED RELEASE ORAL EVERY EVENING
COMMUNITY
Start: 2022-10-05

## 2022-10-31 ENCOUNTER — APPOINTMENT (OUTPATIENT)
Dept: LAB | Facility: HOSPITAL | Age: 67
End: 2022-10-31

## 2022-10-31 DIAGNOSIS — C82.08 FOLLICULAR LYMPHOMA GRADE I OF LYMPH NODES OF MULTIPLE SITES (HCC): ICD-10-CM

## 2022-10-31 LAB
ALBUMIN SERPL BCP-MCNC: 3.7 G/DL (ref 3.5–5)
ALP SERPL-CCNC: 59 U/L (ref 46–116)
ALT SERPL W P-5'-P-CCNC: 48 U/L (ref 12–78)
ANION GAP SERPL CALCULATED.3IONS-SCNC: 6 MMOL/L (ref 4–13)
AST SERPL W P-5'-P-CCNC: 28 U/L (ref 5–45)
BASOPHILS # BLD AUTO: 0.07 THOUSANDS/ÂΜL (ref 0–0.1)
BASOPHILS NFR BLD AUTO: 1 % (ref 0–1)
BILIRUB SERPL-MCNC: 0.39 MG/DL (ref 0.2–1)
BUN SERPL-MCNC: 23 MG/DL (ref 5–25)
CALCIUM SERPL-MCNC: 9.8 MG/DL (ref 8.3–10.1)
CHLORIDE SERPL-SCNC: 106 MMOL/L (ref 96–108)
CO2 SERPL-SCNC: 27 MMOL/L (ref 21–32)
CREAT SERPL-MCNC: 1.02 MG/DL (ref 0.6–1.3)
EOSINOPHIL # BLD AUTO: 0.19 THOUSAND/ÂΜL (ref 0–0.61)
EOSINOPHIL NFR BLD AUTO: 4 % (ref 0–6)
ERYTHROCYTE [DISTWIDTH] IN BLOOD BY AUTOMATED COUNT: 18.2 % (ref 11.6–15.1)
GFR SERPL CREATININE-BSD FRML MDRD: 76 ML/MIN/1.73SQ M
GLUCOSE P FAST SERPL-MCNC: 139 MG/DL (ref 65–99)
HCT VFR BLD AUTO: 34.9 % (ref 36.5–49.3)
HGB BLD-MCNC: 11.2 G/DL (ref 12–17)
IMM GRANULOCYTES # BLD AUTO: 0.06 THOUSAND/UL (ref 0–0.2)
IMM GRANULOCYTES NFR BLD AUTO: 1 % (ref 0–2)
LYMPHOCYTES # BLD AUTO: 0.32 THOUSANDS/ÂΜL (ref 0.6–4.47)
LYMPHOCYTES NFR BLD AUTO: 6 % (ref 14–44)
MCH RBC QN AUTO: 28.1 PG (ref 26.8–34.3)
MCHC RBC AUTO-ENTMCNC: 32.1 G/DL (ref 31.4–37.4)
MCV RBC AUTO: 88 FL (ref 82–98)
MONOCYTES # BLD AUTO: 0.75 THOUSAND/ÂΜL (ref 0.17–1.22)
MONOCYTES NFR BLD AUTO: 14 % (ref 4–12)
NEUTROPHILS # BLD AUTO: 3.93 THOUSANDS/ÂΜL (ref 1.85–7.62)
NEUTS SEG NFR BLD AUTO: 74 % (ref 43–75)
NRBC BLD AUTO-RTO: 0 /100 WBCS
PLATELET # BLD AUTO: 161 THOUSANDS/UL (ref 149–390)
PMV BLD AUTO: 10.2 FL (ref 8.9–12.7)
POTASSIUM SERPL-SCNC: 4.3 MMOL/L (ref 3.5–5.3)
PROT SERPL-MCNC: 7.2 G/DL (ref 6.4–8.4)
RBC # BLD AUTO: 3.99 MILLION/UL (ref 3.88–5.62)
SODIUM SERPL-SCNC: 139 MMOL/L (ref 135–147)
WBC # BLD AUTO: 5.32 THOUSAND/UL (ref 4.31–10.16)

## 2022-11-01 ENCOUNTER — HOSPITAL ENCOUNTER (OUTPATIENT)
Dept: INFUSION CENTER | Facility: CLINIC | Age: 67
Discharge: HOME | End: 2022-11-01

## 2022-11-01 VITALS
HEIGHT: 75 IN | RESPIRATION RATE: 18 BRPM | DIASTOLIC BLOOD PRESSURE: 73 MMHG | TEMPERATURE: 97.5 F | HEART RATE: 72 BPM | BODY MASS INDEX: 38.67 KG/M2 | WEIGHT: 311 LBS | SYSTOLIC BLOOD PRESSURE: 141 MMHG

## 2022-11-01 DIAGNOSIS — C82.08 FOLLICULAR LYMPHOMA GRADE I OF LYMPH NODES OF MULTIPLE SITES (HCC): Primary | ICD-10-CM

## 2022-11-01 RX ORDER — ACETAMINOPHEN 325 MG/1
650 TABLET ORAL ONCE
Status: COMPLETED | OUTPATIENT
Start: 2022-11-01 | End: 2022-11-01

## 2022-11-01 RX ORDER — SODIUM CHLORIDE 9 MG/ML
20 INJECTION, SOLUTION INTRAVENOUS ONCE
Status: COMPLETED | OUTPATIENT
Start: 2022-11-01 | End: 2022-11-01

## 2022-11-01 RX ADMIN — ACETAMINOPHEN 650 MG: 325 TABLET, FILM COATED ORAL at 08:45

## 2022-11-01 RX ADMIN — DIPHENHYDRAMINE HYDROCHLORIDE 25 MG: 50 INJECTION, SOLUTION INTRAMUSCULAR; INTRAVENOUS at 08:44

## 2022-11-01 RX ADMIN — BENDAMUSTINE HYDROCHLORIDE 234.9 MG: 25 INJECTION, SOLUTION INTRAVENOUS at 13:20

## 2022-11-01 RX ADMIN — RITUXIMAB 978.8 MG: 10 INJECTION, SOLUTION INTRAVENOUS at 09:52

## 2022-11-01 RX ADMIN — SODIUM CHLORIDE 20 ML/HR: 0.9 INJECTION, SOLUTION INTRAVENOUS at 08:21

## 2022-11-01 RX ADMIN — DEXAMETHASONE SODIUM PHOSPHATE: 10 INJECTION, SOLUTION INTRAMUSCULAR; INTRAVENOUS at 09:09

## 2022-11-01 NOTE — PROGRESS NOTES
Pt reports to unit offering no complaints today  Patient receiving Rituxan and Bendeka , tolerated infusions well without any adverse events  Patient aware of next appointment, Port deaccessed, AVS declined

## 2022-11-02 ENCOUNTER — HOSPITAL ENCOUNTER (OUTPATIENT)
Dept: INFUSION CENTER | Facility: CLINIC | Age: 67
Discharge: HOME | End: 2022-11-02

## 2022-11-02 VITALS
DIASTOLIC BLOOD PRESSURE: 66 MMHG | TEMPERATURE: 97.4 F | BODY MASS INDEX: 39.07 KG/M2 | SYSTOLIC BLOOD PRESSURE: 133 MMHG | WEIGHT: 314.2 LBS | RESPIRATION RATE: 17 BRPM | HEIGHT: 75 IN | HEART RATE: 72 BPM

## 2022-11-02 DIAGNOSIS — C82.08 FOLLICULAR LYMPHOMA GRADE I OF LYMPH NODES OF MULTIPLE SITES (HCC): Primary | ICD-10-CM

## 2022-11-02 RX ORDER — SODIUM CHLORIDE 9 MG/ML
20 INJECTION, SOLUTION INTRAVENOUS ONCE
Status: COMPLETED | OUTPATIENT
Start: 2022-11-02 | End: 2022-11-02

## 2022-11-02 RX ADMIN — SODIUM CHLORIDE 20 ML/HR: 0.9 INJECTION, SOLUTION INTRAVENOUS at 08:47

## 2022-11-02 RX ADMIN — BENDAMUSTINE HYDROCHLORIDE 234.9 MG: 25 INJECTION, SOLUTION INTRAVENOUS at 09:35

## 2022-11-02 RX ADMIN — DEXAMETHASONE SODIUM PHOSPHATE: 10 INJECTION, SOLUTION INTRAMUSCULAR; INTRAVENOUS at 08:47

## 2022-11-02 NOTE — PROGRESS NOTES
Pt presents for Bendeka offering no compliants  Pt tolerated treatment without incident  AVS declined  Next appointment reviewed

## 2022-11-21 ENCOUNTER — HOSPITAL ENCOUNTER (OUTPATIENT)
Dept: CT IMAGING | Facility: HOSPITAL | Age: 67
Discharge: HOME | End: 2022-11-21

## 2022-11-21 DIAGNOSIS — I71.20 THORACIC AORTIC ANEURYSM WITHOUT RUPTURE: ICD-10-CM

## 2022-11-21 RX ADMIN — IOHEXOL 100 ML: 350 INJECTION, SOLUTION INTRAVENOUS at 13:25

## 2022-11-22 DIAGNOSIS — C82.08 FOLLICULAR LYMPHOMA GRADE I OF LYMPH NODES OF MULTIPLE SITES (HCC): Primary | ICD-10-CM

## 2022-11-22 RX ORDER — ACETAMINOPHEN 325 MG/1
650 TABLET ORAL ONCE
Status: CANCELLED | OUTPATIENT
Start: 2022-11-29

## 2022-11-22 RX ORDER — SODIUM CHLORIDE 9 MG/ML
20 INJECTION, SOLUTION INTRAVENOUS ONCE
Status: CANCELLED | OUTPATIENT
Start: 2022-11-29

## 2022-11-22 RX ORDER — SODIUM CHLORIDE 9 MG/ML
20 INJECTION, SOLUTION INTRAVENOUS ONCE
Status: CANCELLED | OUTPATIENT
Start: 2022-11-30

## 2022-11-29 ENCOUNTER — HOSPITAL ENCOUNTER (OUTPATIENT)
Dept: INFUSION CENTER | Facility: CLINIC | Age: 67
Discharge: HOME | End: 2022-11-29

## 2022-11-29 VITALS
HEART RATE: 75 BPM | TEMPERATURE: 97.5 F | WEIGHT: 312 LBS | RESPIRATION RATE: 17 BRPM | BODY MASS INDEX: 38.79 KG/M2 | DIASTOLIC BLOOD PRESSURE: 84 MMHG | HEIGHT: 75 IN | SYSTOLIC BLOOD PRESSURE: 154 MMHG

## 2022-11-29 DIAGNOSIS — C82.08 FOLLICULAR LYMPHOMA GRADE I OF LYMPH NODES OF MULTIPLE SITES (HCC): Primary | ICD-10-CM

## 2022-11-29 LAB
ALBUMIN SERPL BCP-MCNC: 3.6 G/DL (ref 3.5–5)
ALP SERPL-CCNC: 66 U/L (ref 46–116)
ALT SERPL W P-5'-P-CCNC: 69 U/L (ref 12–78)
ANION GAP SERPL CALCULATED.3IONS-SCNC: 10 MMOL/L (ref 4–13)
AST SERPL W P-5'-P-CCNC: 42 U/L (ref 5–45)
BASOPHILS # BLD AUTO: 0.07 THOUSANDS/ÂΜL (ref 0–0.1)
BASOPHILS NFR BLD AUTO: 1 % (ref 0–1)
BILIRUB SERPL-MCNC: 0.25 MG/DL (ref 0.2–1)
BUN SERPL-MCNC: 22 MG/DL (ref 5–25)
CALCIUM SERPL-MCNC: 9.2 MG/DL (ref 8.3–10.1)
CHLORIDE SERPL-SCNC: 103 MMOL/L (ref 96–108)
CO2 SERPL-SCNC: 28 MMOL/L (ref 21–32)
CREAT SERPL-MCNC: 1.04 MG/DL (ref 0.6–1.3)
EOSINOPHIL # BLD AUTO: 0.38 THOUSAND/ÂΜL (ref 0–0.61)
EOSINOPHIL NFR BLD AUTO: 7 % (ref 0–6)
ERYTHROCYTE [DISTWIDTH] IN BLOOD BY AUTOMATED COUNT: 15.8 % (ref 11.6–15.1)
GFR SERPL CREATININE-BSD FRML MDRD: 74 ML/MIN/1.73SQ M
GLUCOSE SERPL-MCNC: 155 MG/DL (ref 65–140)
HCT VFR BLD AUTO: 34.9 % (ref 36.5–49.3)
HGB BLD-MCNC: 11.4 G/DL (ref 12–17)
IMM GRANULOCYTES # BLD AUTO: 0.03 THOUSAND/UL (ref 0–0.2)
IMM GRANULOCYTES NFR BLD AUTO: 1 % (ref 0–2)
LYMPHOCYTES # BLD AUTO: 0.43 THOUSANDS/ÂΜL (ref 0.6–4.47)
LYMPHOCYTES NFR BLD AUTO: 8 % (ref 14–44)
MCH RBC QN AUTO: 28.9 PG (ref 26.8–34.3)
MCHC RBC AUTO-ENTMCNC: 32.7 G/DL (ref 31.4–37.4)
MCV RBC AUTO: 88 FL (ref 82–98)
MONOCYTES # BLD AUTO: 0.71 THOUSAND/ÂΜL (ref 0.17–1.22)
MONOCYTES NFR BLD AUTO: 13 % (ref 4–12)
NEUTROPHILS # BLD AUTO: 3.97 THOUSANDS/ÂΜL (ref 1.85–7.62)
NEUTS SEG NFR BLD AUTO: 70 % (ref 43–75)
NRBC BLD AUTO-RTO: 0 /100 WBCS
PLATELET # BLD AUTO: 159 THOUSANDS/UL (ref 149–390)
PMV BLD AUTO: 10 FL (ref 8.9–12.7)
POTASSIUM SERPL-SCNC: 4.2 MMOL/L (ref 3.5–5.3)
PROT SERPL-MCNC: 7.1 G/DL (ref 6.4–8.4)
RBC # BLD AUTO: 3.95 MILLION/UL (ref 3.88–5.62)
SODIUM SERPL-SCNC: 141 MMOL/L (ref 135–147)
WBC # BLD AUTO: 5.59 THOUSAND/UL (ref 4.31–10.16)

## 2022-11-29 RX ORDER — ACETAMINOPHEN 325 MG/1
650 TABLET ORAL ONCE
Status: COMPLETED | OUTPATIENT
Start: 2022-11-29 | End: 2022-11-29

## 2022-11-29 RX ORDER — SODIUM CHLORIDE 9 MG/ML
20 INJECTION, SOLUTION INTRAVENOUS ONCE
Status: COMPLETED | OUTPATIENT
Start: 2022-11-29 | End: 2022-11-29

## 2022-11-29 RX ADMIN — RITUXIMAB 978.8 MG: 10 INJECTION, SOLUTION INTRAVENOUS at 10:30

## 2022-11-29 RX ADMIN — DEXAMETHASONE SODIUM PHOSPHATE: 10 INJECTION, SOLUTION INTRAMUSCULAR; INTRAVENOUS at 10:01

## 2022-11-29 RX ADMIN — DIPHENHYDRAMINE HYDROCHLORIDE 25 MG: 50 INJECTION, SOLUTION INTRAMUSCULAR; INTRAVENOUS at 09:33

## 2022-11-29 RX ADMIN — SODIUM CHLORIDE 20 ML/HR: 9 INJECTION, SOLUTION INTRAVENOUS at 08:34

## 2022-11-29 RX ADMIN — BENDAMUSTINE HYDROCHLORIDE 234.9 MG: 25 INJECTION, SOLUTION INTRAVENOUS at 13:46

## 2022-11-29 RX ADMIN — ACETAMINOPHEN 650 MG: 325 TABLET ORAL at 09:32

## 2022-11-29 NOTE — PROGRESS NOTES
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     Hem/Onc Problem List:     1  Follicular lymphoma of intra-abdominal lymph nodes, unspecified follicular lymphoma type [facility])    Chief Complaint:    Follow up between chemotherapy treatments     Assessment/Plan:   1  Follicular Lymphoma, WHO grade 1-2, initially diagnosed 07/8/2021  Patient was on observation and had no constitutional symptoms  Recently, he developed complaint of chest pain likely related to epigastric pain with burning sensation  He then began having discomfort in the abdomen in other areas  Also began having diarrhea  He had extensive workup from GI which was unrevealing  GI physician spoke with my attending who believes symptoms could be related to his follicular lymphoma  As a result, treatment was warranted  Patient started BR treatment Q 28 days on September 6, 2022  · Discussion of decision making    I personally reviewed the following lab results, the image studies, pathology, other specialty/physicians consult notes and recommendations, and outside medical records from [address] [place] [address]  I had a lengthy discussion with the patient and shared the work-up findings  I spent 30 minutes reviewing the records (labs, clinician notes, outside records, medical history, ordering medicine/tests/procedures, interpreting the imaging/labs previously done) and coordination of care as well as direct time with the patient today, of which greater than 50% of the time was spent in counseling and coordination of care with the patient/family  · Plan/Labs  · Patient is status post 5 cycles of BR 28 day treatment  Current ECOG is still 0   · Cycle 6, day 1 is scheduled for 1/24/2023    Patient will have lab work prior to this treatment  · Port in place  · Treatment plan:  · Bendamustine 90mg/m2 D1, D2 + Rituxan 375mg/m2 D1 Q28 days  · Supportive therapies:   · Allopurinol 300mg TID  · Zofran 8mg Q8 hours PRN for N/V (has not required thus far)  · EMLA cream 30 minutes prior to port access  · Restage PET-CT scheduled for 2/1/2023  Patient recently had a CTA of the chest to monitor his aortic aneurism  This showed small pulmonary nodules which are likely reactive  Will monitor on next PET/CT  Also mentioned was an increase in size of splenic lesion  However, this is 45 mm versus 8 6 cm from scan 05/2022  I will reach out to Radiology to obtain clarification  Follow Up: Next visit in January with Dr Bert Grewal and then afterwards follow up 1 week post PET-CT with Dr Bert Grewal     All questions were answered to the patient's satisfaction during this encounter  The patient knows the contact information for our office and knows to reach out for any relevant concerns related to this encounter  They are to call for any temperature 100 4 or higher, new symptoms including but not restricted to shaking chills, decreased appetite, nausea, vomiting, diarrhea, increased fatigue, shortness of breath or chest pain, confusion, and not feeling the strength to come to the clinic  For all other listed problems and medical diagnosis in their chart - they are managed by PCP and/or other specialists, which the patient acknowledges  Thank you very much for your consultation and making us a part of this patient's care  We are continuing to follow closely with you  Please do not hesitate to reach out to me with any additional questions or concerns  AJCC 8th Edition Cancer Stage :      Cancer Staging  No matching staging information was found for the patient      Hematology/Oncology History:   - incidentally diagnosed while doing imaging for aortic aneurysm, showed retroperitoneum and mesenteric lymphadenopathy, largest lymph node about 3 cm, status post biopsy showed follicular lymphoma; grade 1-2;   - 7/2021 :  Developed a big hematoma post biopsy   Hospitalized   Hemoglobin dropped to 7 8 then stable  -  8/2021 : Hb recovered  PET showed moderate tumor burden  Does not fit GELF criteria for treatment; FLIPI score = 2, age +  Stage 3  PET CT showed low SUV  Decided to observe  - Feb 28, 2022 US Head and neck:Patient's lump in the right submandibular region corresponds to an enlarged, heterogeneous submandibular lymph node measuring 1 8 x 1 1 x 1 9 cm (image 18 ) Review of the prior PET/CT from 8/10/2021 demonstrate an enlarged, hypermetabolic node in this area, therefore this is likely part of the patient's known follicular lymphoma  Difficult to assess interval change because of the difference in imaging modality  - May 12, 2022, CT CAP w/o c with interval increase in mesenteric lymph nodes, new pulmonary nodules  Conglomerate kennedy mass at the root of the small bowel mesentery on image 82 of series 2 measures approximately 12 0 x 7 5 cm, increased from 8 9 x 6 3 cm when measured using similar technique on July 10, 2021   Discrete upper retroperitoneal nodes are slightly increased from July 10, 2021, specifically an aortocaval node measuring 2 9 x 2 3 cm on image 74 of series 2, increased from 2 2 x 2 0 cm on prior exam and an anterior left periaortic node measuring 2 4 x 2 0 cm on image 76 of series 2 increased from 2 1 x 1 7 cm on prior examination    · August 6, 2022, CT abd, pelvis w/o contrast:  · 8 6 cm low-attenuation focus within the spleen which is not present on CT examination of 5/12/2022  Differential considerations include splenic abscess or progression of patient's follicular lymphoma  This finding can be further evaluated with contrast-enhanced MR abdomen  · Progression of upper abdominal lymphadenopathy  Mesenteric and retroperitoneal lymph nodes kennedy burden appears similar to 5/12/2022    · New 2 6 cm cystic focus immediately deep to the umbilicus likely to represent additional disease spread  · August 8, 2022, patient had an EGD showing erythema in antrum  Small proximal 1 cm sliding hiatal hernia  Otherwise unrevealing findings  Biopsies were benign  · August 19, 2022, chronic hepatitis panel was negative  · September 6th, 2022 started cycle 1, day 1 of Bendamustine + Rituxan Q28 days  History of Present Illiness:   Jovanna Rawls is a 78 y o  male with the above-noted HemOnc history who is here  to follow-up regarding history of follicular lymphoma  Patient has been on having ongoing GI complaints  Patient has history of GERD, chest pain likely related to GERD  He also has been having decreased appetite causing some weight loss  He still has sensation of upset stomach  GI physicians spoke with my attending who believes his symptoms could be related to his follicular lymphoma  Patient was started on BR treatment Q 28 days September 6, 2022  Interval history:  11/30/2022:  Patient came in for follow-up  He is tolerating treatment well without severe side effect  He has 1 more treatment left next month  He has no new headache, vision change, chest pain, shortness of breath, abdominal pain, nausea/vomiting/diarrhea  He overall offers no complaints in today's visit  No new constitutional symptoms  Present with his wife  No fever or chills  No exertional chest pain, diaphoresis or shortness  of breath  No cough and phlegm, no hemoptysis  Patient denied nausea  and vomiting  No weight loss  Improvement in his abdominal pain and gastric reflux symptoms  No  symptoms  No headache or blurred vision  No seizure activity  Appetite good  No significant weight gain  The patient denies bleeding anywhere  ROS: A 12-point of review of systems is obtained and other than the above is noncontributory       Objective:   VITALS:   /74   Pulse 74   Temp (!) 97 4 °F (36 3 °C)   Resp 18 Ht 6' 3" (1 905 m)   Wt (!) 143 kg (316 lb)   BMI 39 50 kg/m²     Physical EXAM:  General:  Alert, cooperative, no distress, appears stated age  Head:  Normocephalic, without obvious abnormality, atraumatic  Eyes:  Conjunctivae/corneas clear  PERRL, EOMs intact  No evidence of conjunctivitis     Throat: Lips, mucosa, and tongue normal   No bleeding from mouth  Neck: Supple, symmetrical, trachea midline    Lungs:    Respiratory effort easy, nonlabored    Heart:  Regular rate and rhythm, S1, S2 normal    Abdomen:   Soft, non-tender,nondistended  No masses,  No organomegaly  Extremities:  Lymphatics: Extremities normal, atraumatic, no cyanosis or edema  No cervical, axillary or inguinal adenopathy   Skin: Skin color, texture, turgor normal  No rashes  Neurologic: A&Ox4   No focal neuro deficits       Allergies   Allergen Reactions   • Lisinopril Cough       Past Medical History:   Diagnosis Date   • Chronic kidney disease    • Diabetes mellitus ([facility] [address] )    • Follicular lymphoma ([facility] [address] )    • High cholesterol    • Hypertension        Past Surgical History:   Procedure Laterality Date   • BUNIONECTOMY Right 11/24/2020    Procedure: RIGHT HAV CORRECTION,;  Surgeon: Flory Noyola DPM;  [facility];  Service: Podiatry   • COLONOSCOPY     • INCISION AND DRAINAGE OF WOUND Right 10/05/2016    Procedure: INCISION AND DRAINAGE (I&D) EXTREMITY;  Surgeon: Flory Noyola DPM;  [facility];  Service:    • IR BIOPSY LYMPH NODE  07/08/2021   • IR EMBOLIZATION (SPECIFY VESSEL OR SITE)  07/08/2021   • IR PORT PLACEMENT  9/1/2022   • PILONIDAL CYST EXCISION     • OK REPAIR OF HAMMERTOE,ONE Right 02/19/2019    Procedure: THIRD HAMMER TOE CORRECTION;  Surgeon: Flory Noyola DPM;  [facility];  Service: Podiatry   • TOE OSTEOTOMY Right 03/14/2017    Procedure: HAMMERTOE CORRECTION R 2 ;  Surgeon: Flory Noyola DPM;  [facility];  Service:    • TOE OSTEOTOMY Left 11/24/2020    Procedure: LEFT HT CORRECTION TOE;  Surgeon: Paulo Santana DPM;  [facility];  Service: Podiatry   • TONSILLECTOMY  1963       Family History   Problem Relation Age of Onset   • Cancer Father         Leukemia       Social History     Socioeconomic History   • Marital status: /Civil Union     Spouse name: Not on file   • Number of children: Not on file   • Years of education: Not on file   • Highest education level: Not on file   Occupational History   • Not on file   Tobacco Use   • Smoking status: Never   • Smokeless tobacco: Never   • Tobacco comments:     Never smoked but exposed to second hand smoke from birth until 19's   Vaping Use   • Vaping Use: Never used   Substance and Sexual Activity   • Alcohol use: No   • Drug use: No   • Sexual activity: Not Currently     Partners: Female     Birth control/protection: Abstinence   Other Topics Concern   • Not on file   Social History Narrative   • Not on file     Social Determinants of Health     Financial Resource Strain: Not on file   Food Insecurity: Not on file   Transportation Needs: Not on file   Physical Activity: Not on file   Stress: Not on file   Social Connections: Not on file   Intimate Partner Violence: Not on file   Housing Stability: Not on file       Current Outpatient Medications   Medication Sig Dispense Refill   • allopurinol (ZYLOPRIM) 100 mg tablet Take 3 tablets (300 mg total) by mouth daily 30 tablet 1   • amLODIPine (NORVASC) 10 mg tablet take 1 tablet by mouth once daily AT NOON     • aspirin 81 mg chewable tablet Chew 81 mg daily     • famotidine (PEPCID) 40 MG tablet Take 1 tablet (40 mg total) by mouth daily at bedtime 30 tablet 3   • fluticasone (FLONASE) 50 mcg/act nasal spray instill 2 sprays into each nostril one to two times a day     • hydrochlorothiazide (HYDRODIURIL) 25 mg tablet Take 25 mg by mouth daily     • lidocaine-prilocaine (EMLA) cream Apply to port area 30 minutes prior to access 30 g 0   • losartan (COZAAR) 100 MG tablet Take 0 5 tablets (50 mg total) by mouth daily (Patient taking differently: Take 100 mg by mouth daily)     • metFORMIN (GLUCOPHAGE) 500 mg tablet Take 1,000 mg by mouth 2 (two) times a day with meals     • metoprolol succinate (TOPROL-XL) 100 mg 24 hr tablet Take 100 mg by mouth every evening     • metoprolol succinate (TOPROL-XL) 50 mg 24 hr tablet Take 50 mg by mouth every evening     • Multiple Vitamins-Minerals (Centrum Silver 50+Men) TABS      • ondansetron (ZOFRAN) 8 mg tablet Take 1 tablet (8 mg total) by mouth every 8 (eight) hours as needed for nausea or vomiting 30 tablet 2   • simvastatin (ZOCOR) 40 mg tablet Take 40 mg by mouth daily at bedtime       No current facility-administered medications for this visit  (Not in a hospital admission)      DATA REVIEW:    Pathology Result:    Final Diagnosis   Date Value Ref Range Status   08/15/2022   Final    A  Stomach, linear antral erythema:  - Gastric antral mucosa with mild chronic active gastritis and regenerative epithelial changes   - No Helicobacter pylori organisms are identified with immunoperoxidase stain   - Negative for intestinal metaplasia, dysplasia or carcinoma  B  Esophagus, irregular z line 39:  - Squamocolumnar junction mucosa with mild chronic inflammation and regenerative epithelial changes   - No intestinal metaplasia/ dysplasia identified  C  Esophagogastric junction, small nodule ge junction:  - Fragments of esophageal squamous and gastric glandular mucosa with intestinal metaplasia and regenerative epithelial changes  - Intestinal metaplasia identified with Alcian blue/PAS stain   - Pan keratin stain highlights benign epithelial elements   - No dysplasia identified  See note  Note (C): This biopsy shows gastric-type mucosa with scattered goblet cells  The diagnosis in this case depends on the location of this biopsy    If this biopsy was taken from the tubular esophagus at least 1 cm above the gastric folds, it represents Ozuna mucosa of the distinctive type  If this biopsy was taken from the gastric cardia, it represents intestinal metaplasia of the gastric cardia  Reference: [place] Shukla LB; [employer] [facility]astro[facility]  [facility] Clinical Guideline: Diagnosis and Management of Ozuna's Esophagus  Juliana Whelan  2016 Jan;[phone]      07/08/2021   Final    A  Lymph node, periaortic, biopsy:  -  Follicular lymphoma, WHO grade 1-2 (of 3) (see note)       06/28/2021   Final    A  Duodenum:  - Small bowel mucosa with no significant histopathologic abnormality   - Negative for malabsorption pattern  - Negative for malignancy  B  Stomach:  - Gastric mucosa with no significant histopathologic abnormality   - Negative for H  pylori by routine H&E   - Negative for malignancy  C  Esophagus, distal:  - Squamocolumnar mucosa with intestinal metaplasia  See comment   - Negative for dysplasia and malignancy  Comment: The diagnosis in this case depends on the location of this biopsy  If this biopsy was taken from the tubular esophagus at least 1 cm above the gastric folds, it represents Ozuna mucosa  If this biopsy was taken from the gastric cardia, it represents intestinal metaplasia of the gastric cardia  Image Results: They are reviewed and documented in Hematology/Oncology history    CTA chest wo w contrast  Narrative: CT ANGIOGRAM OF THE CHEST, WITH AND WITHOUT IV CONTRAST    INDICATION:   I71 20: Thoracic aortic aneurysm, without rupture, unspecified  History of follicular lymphoma  COMPARISON: CT, dated 5/12/2022  TECHNIQUE:  CT angiogram examination of the chest was performed according to standard protocol  Contrast as well as noncontrast images were obtained     This examination, like all CT scans performed in the [facility], was performed   utilizing techniques to minimize radiation dose exposure, including the use of iterative reconstruction and automated exposure control  3D reconstructions were performed an independent workstation, and are supplied for review  Rad dose 1537 mGy-cm     IV Contrast:  100 mL of iohexol (OMNIPAQUE)     FINDINGS:   THORACIC VASCULAR STRUCTURES:  HEART:  Heart is normal in size  Mild mitral valvular and aortic valvular calcifications  Coronary arterial atherosclerotic calcifications are moderate  PULMONARY ARTERIES:  The main pulmonary artery is normal in size  Nontargeted evaluation shows no central pulmonary embolism  ASCENDING AORTA:  Redemonstrated is aneurysmal dilatation of the ascending thoracic aorta, measuring 44 x 43 mm (series 3, image 76), previously 44 x 40 mm on a study in May 2021  AORTIC ARCH:  The three-vessel arch is normal   DESCENDING THORACIC AORTA:  The descending thoracic aorta is normal in caliber, with no atherosclerotic calcifications  ABDOMINAL VASCULAR STRUCTURES:     CELIAC ARTERY:  Conventional branching anatomy is present  There are mild atherosclerotic calcifications at the ostia  SUPERIOR MESENTERIC ARTERY:  Normal caliber, without atherosclerotic calcifications  RIGHT RENAL ARTERY:  The single renal artery is normal in caliber  LEFT RENAL ARTERY:  The single renal artery is normal in caliber  OTHER FINDINGS:    LUNGS:  Previous pulmonary nodules have resolved  Multiple new pulmonary nodules are present, the largest of which in the left upper lobe measures 10 mm (series 4, image 56)  Remainder of pulmonary nodules are annotated on series 4  Persistent right upper lobe scarring  Bibasilar atelectasis  No focal lung consolidations  Bronchial wall thickening  Central airways are normal     PLEURA:  Unremarkable  MEDIASTINUM AND GABRIEL:  Unremarkable  CHEST WALL AND LOWER NECK:   Right internal jugular venous port terminates at the superior cavoatrial junction      VISUALIZED STRUCTURES IN THE UPPER ABDOMEN:  Scattered lymph nodes in the upper retroperitoneum are incompletely visualized, however have decreased in size from the prior study in May  Splenic lesion now measures 45 x 45 mm (series 3, image 105),   previously 20 x 16 mm  VISUALIZED OSSEOUS STRUCTURES:  No acute fracture or destructive osseous lesion  Impression: 1  Stable ectasia of the ascending thoracic aorta, measuring 44 x 43 mm, as queried  2   Resolution of previous pulmonary nodules from May  Scattered new pulmonary nodules, the largest of which measures 10 mm  Given resolution of prior nodules, these are likely attributed to infection or inflammatory etiologies, however short interval   follow-up in 3-6 months is reasonable  3   Decrease in size of incompletely visualized upper retroperitoneal lymph nodes  Increase in size of a splenic lesion, now measuring 45 mm  The study was marked in EPIC for significant notification  Workstation performed: [id number]        LABS:  Lab data are reviewed and documented in HemOnc history       Recent Results (from the past 48 hour(s))   CBC and differential    Collection Time: 11/29/22  8:38 AM   Result Value Ref Range    WBC 5 59 4 31 - 10 16 Thousand/uL    RBC 3 95 3 88 - 5 62 Million/uL    Hemoglobin 11 4 (L) 12 0 - 17 0 g/dL    Hematocrit 34 9 (L) 36 5 - 49 3 %    MCV 88 82 - 98 fL    MCH 28 9 26 8 - 34 3 pg    MCHC 32 7 31 4 - 37 4 g/dL    RDW 15 8 (H) 11 6 - 15 1 %    MPV 10 0 8 9 - 12 7 fL    Platelets 487 800 - 368 Thousands/uL    nRBC 0 /100 WBCs    Neutrophils Relative 70 43 - 75 %    Immat GRANS % 1 0 - 2 %    Lymphocytes Relative 8 (L) 14 - 44 %    Monocytes Relative 13 (H) 4 - 12 %    Eosinophils Relative 7 (H) 0 - 6 %    Basophils Relative 1 0 - 1 %    Neutrophils Absolute 3 97 1 85 - 7 62 Thousands/µL    Immature Grans Absolute 0 03 0 00 - 0 20 Thousand/uL    Lymphocytes Absolute 0 43 (L) 0 60 - 4 47 Thousands/µL    Monocytes Absolute 0 71 0 17 - 1 22 Thousand/µL    Eosinophils Absolute 0 38 0 00 - 0 61 Thousand/µL Basophils Absolute 0 07 0 00 - 0 10 Thousands/µL   Comprehensive metabolic panel    Collection Time: 11/29/22  8:38 AM   Result Value Ref Range    Sodium 141 135 - 147 mmol/L    Potassium 4 2 3 5 - 5 3 mmol/L    Chloride 103 96 - 108 mmol/L    CO2 28 21 - 32 mmol/L    ANION GAP 10 4 - 13 mmol/L    BUN 22 5 - 25 mg/dL    Creatinine 1 04 0 60 - 1 30 mg/dL    Glucose 155 (H) 65 - 140 mg/dL    Calcium 9 2 8 3 - 10 1 mg/dL    AST 42 5 - 45 U/L    ALT 69 12 - 78 U/L    Alkaline Phosphatase 66 46 - 116 U/L    Total Protein 7 1 6 4 - 8 4 g/dL    Albumin 3 6 3 5 - 5 0 g/dL    Total Bilirubin 0 25 0 20 - 1 00 mg/dL    eGFR 74 ml/min/1 73sq tahir Hurtado  11/30/2022, 11:09 AM

## 2022-11-30 ENCOUNTER — OFFICE VISIT (OUTPATIENT)
Dept: HEMATOLOGY ONCOLOGY | Facility: CLINIC | Age: 67
End: 2022-11-30

## 2022-11-30 ENCOUNTER — HOSPITAL ENCOUNTER (OUTPATIENT)
Dept: INFUSION CENTER | Facility: CLINIC | Age: 67
Discharge: HOME | End: 2022-11-30

## 2022-11-30 ENCOUNTER — TELEPHONE (OUTPATIENT)
Dept: HEMATOLOGY ONCOLOGY | Facility: CLINIC | Age: 67
End: 2022-11-30

## 2022-11-30 VITALS
TEMPERATURE: 97.4 F | DIASTOLIC BLOOD PRESSURE: 74 MMHG | HEIGHT: 75 IN | BODY MASS INDEX: 39.17 KG/M2 | WEIGHT: 315 LBS | HEART RATE: 74 BPM | SYSTOLIC BLOOD PRESSURE: 157 MMHG | RESPIRATION RATE: 18 BRPM

## 2022-11-30 VITALS
TEMPERATURE: 97.4 F | HEIGHT: 75 IN | HEART RATE: 74 BPM | WEIGHT: 315 LBS | SYSTOLIC BLOOD PRESSURE: 157 MMHG | BODY MASS INDEX: 39.17 KG/M2 | DIASTOLIC BLOOD PRESSURE: 74 MMHG | RESPIRATION RATE: 18 BRPM

## 2022-11-30 DIAGNOSIS — C82.08 FOLLICULAR LYMPHOMA GRADE I OF LYMPH NODES OF MULTIPLE SITES (HCC): Primary | ICD-10-CM

## 2022-11-30 DIAGNOSIS — C48.1: ICD-10-CM

## 2022-11-30 DIAGNOSIS — R91.8 PULMONARY NODULES: ICD-10-CM

## 2022-11-30 DIAGNOSIS — D64.9 ANEMIA, UNSPECIFIED TYPE: ICD-10-CM

## 2022-11-30 DIAGNOSIS — Z95.828 PORT-A-CATH IN PLACE: ICD-10-CM

## 2022-11-30 RX ORDER — SODIUM CHLORIDE 9 MG/ML
20 INJECTION, SOLUTION INTRAVENOUS ONCE
OUTPATIENT
Start: 2022-12-28

## 2022-11-30 RX ORDER — SODIUM CHLORIDE 9 MG/ML
20 INJECTION, SOLUTION INTRAVENOUS ONCE
OUTPATIENT
Start: 2023-01-25

## 2022-11-30 RX ORDER — ACETAMINOPHEN 325 MG/1
650 TABLET ORAL ONCE
OUTPATIENT
Start: 2022-12-27

## 2022-11-30 RX ORDER — ACETAMINOPHEN 325 MG/1
650 TABLET ORAL ONCE
OUTPATIENT
Start: 2023-01-24

## 2022-11-30 RX ORDER — SODIUM CHLORIDE 9 MG/ML
20 INJECTION, SOLUTION INTRAVENOUS ONCE
OUTPATIENT
Start: 2022-12-27

## 2022-11-30 RX ORDER — SODIUM CHLORIDE 9 MG/ML
20 INJECTION, SOLUTION INTRAVENOUS ONCE
OUTPATIENT
Start: 2023-01-24

## 2022-11-30 RX ORDER — SODIUM CHLORIDE 9 MG/ML
20 INJECTION, SOLUTION INTRAVENOUS ONCE
Status: COMPLETED | OUTPATIENT
Start: 2022-11-30 | End: 2022-11-30

## 2022-11-30 RX ADMIN — BENDAMUSTINE HYDROCHLORIDE 234.9 MG: 25 INJECTION, SOLUTION INTRAVENOUS at 10:03

## 2022-11-30 RX ADMIN — DEXAMETHASONE SODIUM PHOSPHATE: 10 INJECTION, SOLUTION INTRAMUSCULAR; INTRAVENOUS at 09:24

## 2022-11-30 RX ADMIN — SODIUM CHLORIDE 20 ML/HR: 9 INJECTION, SOLUTION INTRAVENOUS at 09:15

## 2022-11-30 NOTE — PROGRESS NOTES
Pt presents for chemo therapy, offers no complaints, tolerated infusion, AVS declined aware of next appt, d/c from unit stable

## 2022-11-30 NOTE — TELEPHONE ENCOUNTER
Message left for patient regarding updated schedule  He uses [email or web address] for updates  My TEAMS number was left for call back should he have any questions

## 2022-12-22 ENCOUNTER — TELEPHONE (OUTPATIENT)
Dept: NEPHROLOGY | Facility: CLINIC | Age: 67
End: 2022-12-22

## 2022-12-22 NOTE — TELEPHONE ENCOUNTER
I called and spoke to the patient and schedule him for his nephrology follow up appointment with Dr Ina Valdivia from our recall list  The patient understood and was okay with the day and time of his appointment

## 2022-12-26 ENCOUNTER — APPOINTMENT (OUTPATIENT)
Dept: LAB | Facility: HOSPITAL | Age: 67
End: 2022-12-26

## 2022-12-26 DIAGNOSIS — C82.08 FOLLICULAR LYMPHOMA GRADE I OF LYMPH NODES OF MULTIPLE SITES (HCC): ICD-10-CM

## 2022-12-26 LAB
ALBUMIN SERPL BCP-MCNC: 3.6 G/DL (ref 3.5–5)
ALP SERPL-CCNC: 55 U/L (ref 46–116)
ALT SERPL W P-5'-P-CCNC: 51 U/L (ref 12–78)
ANION GAP SERPL CALCULATED.3IONS-SCNC: 5 MMOL/L (ref 4–13)
AST SERPL W P-5'-P-CCNC: 35 U/L (ref 5–45)
BASOPHILS # BLD AUTO: 0.05 THOUSANDS/ÂΜL (ref 0–0.1)
BASOPHILS NFR BLD AUTO: 1 % (ref 0–1)
BILIRUB SERPL-MCNC: 0.39 MG/DL (ref 0.2–1)
BUN SERPL-MCNC: 20 MG/DL (ref 5–25)
CALCIUM SERPL-MCNC: 9.3 MG/DL (ref 8.3–10.1)
CHLORIDE SERPL-SCNC: 106 MMOL/L (ref 96–108)
CO2 SERPL-SCNC: 30 MMOL/L (ref 21–32)
CREAT SERPL-MCNC: 1.01 MG/DL (ref 0.6–1.3)
EOSINOPHIL # BLD AUTO: 0.33 THOUSAND/ÂΜL (ref 0–0.61)
EOSINOPHIL NFR BLD AUTO: 8 % (ref 0–6)
ERYTHROCYTE [DISTWIDTH] IN BLOOD BY AUTOMATED COUNT: 14.6 % (ref 11.6–15.1)
GFR SERPL CREATININE-BSD FRML MDRD: 77 ML/MIN/1.73SQ M
GLUCOSE P FAST SERPL-MCNC: 206 MG/DL (ref 65–99)
HCT VFR BLD AUTO: 36.7 % (ref 36.5–49.3)
HGB BLD-MCNC: 11.8 G/DL (ref 12–17)
IMM GRANULOCYTES # BLD AUTO: 0.05 THOUSAND/UL (ref 0–0.2)
IMM GRANULOCYTES NFR BLD AUTO: 1 % (ref 0–2)
LYMPHOCYTES # BLD AUTO: 0.24 THOUSANDS/ÂΜL (ref 0.6–4.47)
LYMPHOCYTES NFR BLD AUTO: 6 % (ref 14–44)
MCH RBC QN AUTO: 28.9 PG (ref 26.8–34.3)
MCHC RBC AUTO-ENTMCNC: 32.2 G/DL (ref 31.4–37.4)
MCV RBC AUTO: 90 FL (ref 82–98)
MONOCYTES # BLD AUTO: 0.79 THOUSAND/ÂΜL (ref 0.17–1.22)
MONOCYTES NFR BLD AUTO: 19 % (ref 4–12)
NEUTROPHILS # BLD AUTO: 2.65 THOUSANDS/ÂΜL (ref 1.85–7.62)
NEUTS SEG NFR BLD AUTO: 65 % (ref 43–75)
NRBC BLD AUTO-RTO: 0 /100 WBCS
PLATELET # BLD AUTO: 102 THOUSANDS/UL (ref 149–390)
PMV BLD AUTO: 9.9 FL (ref 8.9–12.7)
POTASSIUM SERPL-SCNC: 4.7 MMOL/L (ref 3.5–5.3)
PROT SERPL-MCNC: 7 G/DL (ref 6.4–8.4)
RBC # BLD AUTO: 4.09 MILLION/UL (ref 3.88–5.62)
SODIUM SERPL-SCNC: 141 MMOL/L (ref 135–147)
WBC # BLD AUTO: 4.11 THOUSAND/UL (ref 4.31–10.16)

## 2022-12-27 ENCOUNTER — TELEPHONE (OUTPATIENT)
Dept: HEMATOLOGY ONCOLOGY | Facility: CLINIC | Age: 67
End: 2022-12-27

## 2022-12-27 ENCOUNTER — HOSPITAL ENCOUNTER (OUTPATIENT)
Dept: INFUSION CENTER | Facility: CLINIC | Age: 67
Discharge: HOME | End: 2022-12-27

## 2022-12-27 VITALS
RESPIRATION RATE: 18 BRPM | TEMPERATURE: 97.7 F | SYSTOLIC BLOOD PRESSURE: 172 MMHG | DIASTOLIC BLOOD PRESSURE: 102 MMHG | HEART RATE: 91 BPM | OXYGEN SATURATION: 81 %

## 2022-12-27 DIAGNOSIS — C82.08 FOLLICULAR LYMPHOMA GRADE I OF LYMPH NODES OF MULTIPLE SITES (HCC): ICD-10-CM

## 2022-12-27 DIAGNOSIS — C82.08 FOLLICULAR LYMPHOMA GRADE I OF LYMPH NODES OF MULTIPLE SITES (HCC): Primary | ICD-10-CM

## 2022-12-27 NOTE — TELEPHONE ENCOUNTER
Appointment requests signed   Cycle 5 scheduled for 1/5/23 please schedule cycle 5 and cycle 6 thank you

## 2022-12-27 NOTE — PROGRESS NOTES
Upon patient arrival VS obtained, BP elevated  Patient denies any edema, SOB, visual disturbances, headache or other s/s of  hypertension  Patient resting comfortably, BP checked two more times  Final /102, patient states he took his BP medication yesterday, however he missed it for two days prior and ate salty foods over the holiday  Jessica De Guzman RN notified, as per Lavern Barber PA-C patient treatment to be deferred one week  Patient educated on importance of BP medication adherence, patient verbalizes understanding  Aware of next appointment, AVS declined

## 2022-12-27 NOTE — TELEPHONE ENCOUNTER
Received notification that patient sbp 170 manually x3 per infusion RN  Reviewed patient asymptomatic at this time  Reviewed with Collins Marc PA-C patient to be deferred one week  Please defer treatment originally scheduled for 12/27/22 by one week  Please update patient and office RN with new date      Thank you

## 2022-12-27 NOTE — TELEPHONE ENCOUNTER
Message left for patient to call back to go over updated schedule  My TEAMS number was left for call back

## 2022-12-29 DIAGNOSIS — C82.08 FOLLICULAR LYMPHOMA GRADE I OF LYMPH NODES OF MULTIPLE SITES (HCC): Primary | ICD-10-CM

## 2022-12-29 RX ORDER — ACETAMINOPHEN 325 MG/1
650 TABLET ORAL ONCE
Status: CANCELLED | OUTPATIENT
Start: 2023-01-05

## 2022-12-29 RX ORDER — SODIUM CHLORIDE 9 MG/ML
20 INJECTION, SOLUTION INTRAVENOUS ONCE
Status: CANCELLED | OUTPATIENT
Start: 2023-01-05

## 2022-12-29 RX ORDER — SODIUM CHLORIDE 9 MG/ML
20 INJECTION, SOLUTION INTRAVENOUS ONCE
Status: CANCELLED | OUTPATIENT
Start: 2023-01-06

## 2023-01-04 ENCOUNTER — APPOINTMENT (OUTPATIENT)
Dept: LAB | Facility: HOSPITAL | Age: 68
End: 2023-01-04

## 2023-01-04 DIAGNOSIS — C48.1: ICD-10-CM

## 2023-01-04 DIAGNOSIS — C82.08 FOLLICULAR LYMPHOMA GRADE I OF LYMPH NODES OF MULTIPLE SITES (HCC): ICD-10-CM

## 2023-01-04 LAB
ALBUMIN SERPL BCP-MCNC: 3.9 G/DL (ref 3.5–5)
ALP SERPL-CCNC: 66 U/L (ref 46–116)
ALT SERPL W P-5'-P-CCNC: 66 U/L (ref 12–78)
ANION GAP SERPL CALCULATED.3IONS-SCNC: 11 MMOL/L (ref 4–13)
AST SERPL W P-5'-P-CCNC: 44 U/L (ref 5–45)
BASOPHILS # BLD AUTO: 0.05 THOUSANDS/ÂΜL (ref 0–0.1)
BASOPHILS NFR BLD AUTO: 1 % (ref 0–1)
BILIRUB SERPL-MCNC: 0.4 MG/DL (ref 0.2–1)
BUN SERPL-MCNC: 28 MG/DL (ref 5–25)
CALCIUM SERPL-MCNC: 9.6 MG/DL (ref 8.3–10.1)
CHLORIDE SERPL-SCNC: 101 MMOL/L (ref 96–108)
CO2 SERPL-SCNC: 27 MMOL/L (ref 21–32)
CREAT SERPL-MCNC: 0.98 MG/DL (ref 0.6–1.3)
EOSINOPHIL # BLD AUTO: 0.27 THOUSAND/ÂΜL (ref 0–0.61)
EOSINOPHIL NFR BLD AUTO: 6 % (ref 0–6)
ERYTHROCYTE [DISTWIDTH] IN BLOOD BY AUTOMATED COUNT: 14.3 % (ref 11.6–15.1)
GFR SERPL CREATININE-BSD FRML MDRD: 80 ML/MIN/1.73SQ M
GLUCOSE P FAST SERPL-MCNC: 191 MG/DL (ref 65–99)
HCT VFR BLD AUTO: 35.8 % (ref 36.5–49.3)
HGB BLD-MCNC: 11.9 G/DL (ref 12–17)
IMM GRANULOCYTES # BLD AUTO: 0.03 THOUSAND/UL (ref 0–0.2)
IMM GRANULOCYTES NFR BLD AUTO: 1 % (ref 0–2)
LDH SERPL-CCNC: 181 U/L (ref 81–234)
LYMPHOCYTES # BLD AUTO: 0.24 THOUSANDS/ÂΜL (ref 0.6–4.47)
LYMPHOCYTES NFR BLD AUTO: 5 % (ref 14–44)
MCH RBC QN AUTO: 28.6 PG (ref 26.8–34.3)
MCHC RBC AUTO-ENTMCNC: 33.2 G/DL (ref 31.4–37.4)
MCV RBC AUTO: 86 FL (ref 82–98)
MONOCYTES # BLD AUTO: 0.68 THOUSAND/ÂΜL (ref 0.17–1.22)
MONOCYTES NFR BLD AUTO: 15 % (ref 4–12)
NEUTROPHILS # BLD AUTO: 3.4 THOUSANDS/ÂΜL (ref 1.85–7.62)
NEUTS SEG NFR BLD AUTO: 72 % (ref 43–75)
NRBC BLD AUTO-RTO: 0 /100 WBCS
PLATELET # BLD AUTO: 136 THOUSANDS/UL (ref 149–390)
PMV BLD AUTO: 9.9 FL (ref 8.9–12.7)
POTASSIUM SERPL-SCNC: 4.4 MMOL/L (ref 3.5–5.3)
PROT SERPL-MCNC: 7.5 G/DL (ref 6.4–8.4)
RBC # BLD AUTO: 4.16 MILLION/UL (ref 3.88–5.62)
SODIUM SERPL-SCNC: 139 MMOL/L (ref 135–147)
WBC # BLD AUTO: 4.67 THOUSAND/UL (ref 4.31–10.16)

## 2023-01-05 ENCOUNTER — HOSPITAL ENCOUNTER (OUTPATIENT)
Dept: INFUSION CENTER | Facility: CLINIC | Age: 68
Discharge: HOME | End: 2023-01-05

## 2023-01-05 VITALS
RESPIRATION RATE: 18 BRPM | HEART RATE: 82 BPM | WEIGHT: 307.4 LBS | HEIGHT: 75 IN | BODY MASS INDEX: 38.22 KG/M2 | DIASTOLIC BLOOD PRESSURE: 82 MMHG | SYSTOLIC BLOOD PRESSURE: 149 MMHG | TEMPERATURE: 97.4 F

## 2023-01-05 DIAGNOSIS — C82.08 FOLLICULAR LYMPHOMA GRADE I OF LYMPH NODES OF MULTIPLE SITES (HCC): Primary | ICD-10-CM

## 2023-01-05 RX ORDER — ACETAMINOPHEN 325 MG/1
650 TABLET ORAL ONCE
Status: COMPLETED | OUTPATIENT
Start: 2023-01-05 | End: 2023-01-05

## 2023-01-05 RX ORDER — SODIUM CHLORIDE 9 MG/ML
20 INJECTION, SOLUTION INTRAVENOUS ONCE
Status: COMPLETED | OUTPATIENT
Start: 2023-01-05 | End: 2023-01-05

## 2023-01-05 RX ADMIN — ACETAMINOPHEN 650 MG: 325 TABLET, FILM COATED ORAL at 08:23

## 2023-01-05 RX ADMIN — DIPHENHYDRAMINE HYDROCHLORIDE 25 MG: 50 INJECTION, SOLUTION INTRAMUSCULAR; INTRAVENOUS at 08:48

## 2023-01-05 RX ADMIN — SODIUM CHLORIDE 20 ML/HR: 0.9 INJECTION, SOLUTION INTRAVENOUS at 08:20

## 2023-01-05 RX ADMIN — RITUXIMAB 978.8 MG: 10 INJECTION, SOLUTION INTRAVENOUS at 09:47

## 2023-01-05 RX ADMIN — DEXAMETHASONE SODIUM PHOSPHATE: 10 INJECTION, SOLUTION INTRAMUSCULAR; INTRAVENOUS at 08:26

## 2023-01-05 RX ADMIN — BENDAMUSTINE HYDROCHLORIDE 234.9 MG: 25 INJECTION, SOLUTION INTRAVENOUS at 13:16

## 2023-01-05 NOTE — PROGRESS NOTES
Pt presents for chemo therapy, offers no complaints, tolerated infusions well, AVS declined aware of next appt, d/c from unit stable

## 2023-01-06 ENCOUNTER — HOSPITAL ENCOUNTER (OUTPATIENT)
Dept: INFUSION CENTER | Facility: CLINIC | Age: 68
End: 2023-01-06

## 2023-01-06 VITALS
DIASTOLIC BLOOD PRESSURE: 71 MMHG | HEART RATE: 71 BPM | WEIGHT: 307.6 LBS | RESPIRATION RATE: 18 BRPM | HEIGHT: 75 IN | BODY MASS INDEX: 38.24 KG/M2 | SYSTOLIC BLOOD PRESSURE: 129 MMHG | TEMPERATURE: 97.5 F

## 2023-01-06 DIAGNOSIS — C82.08 FOLLICULAR LYMPHOMA GRADE I OF LYMPH NODES OF MULTIPLE SITES (HCC): Primary | ICD-10-CM

## 2023-01-06 RX ORDER — SODIUM CHLORIDE 9 MG/ML
20 INJECTION, SOLUTION INTRAVENOUS ONCE
Status: COMPLETED | OUTPATIENT
Start: 2023-01-06 | End: 2023-01-06

## 2023-01-06 RX ADMIN — SODIUM CHLORIDE 20 ML/HR: 0.9 INJECTION, SOLUTION INTRAVENOUS at 11:50

## 2023-01-06 RX ADMIN — BENDAMUSTINE HYDROCHLORIDE 234.9 MG: 25 INJECTION, SOLUTION INTRAVENOUS at 12:44

## 2023-01-06 RX ADMIN — DEXAMETHASONE SODIUM PHOSPHATE: 10 INJECTION, SOLUTION INTRAMUSCULAR; INTRAVENOUS at 12:01

## 2023-01-06 NOTE — PROGRESS NOTES
Patient presents for On license of [facility] offering no complaints, patient tolerated treatment without incident  AVS declined  Next appointment reviewed

## 2023-01-10 NOTE — PROGRESS NOTES
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     Hem/Onc Problem List:     1  Follicular lymphoma of intra-abdominal lymph nodes, unspecified follicular lymphoma type [facility])    Chief Complaint:    Follow up between chemotherapy treatments     Assessment/Plan:   1  Follicular Lymphoma, WHO grade 1-2, initially diagnosed 07/8/2021  Patient was on observation and had no constitutional symptoms  Recently, he developed complaint of chest pain likely related to epigastric pain with burning sensation  He then began having discomfort in the abdomen in other areas  Also began having diarrhea  He had extensive workup from GI which was unrevealing  GI physician spoke with my attending who believes symptoms could be related to his follicular lymphoma  As a result, treatment was warranted  Patient started BR treatment Q 28 days on September 6, 2022  Last treatment C6D1 scheduled for 2/2/2023  · Discussion of decision making    I personally reviewed the following lab results, the image studies, pathology, other specialty/physicians consult notes and recommendations, and outside medical records from [address] [place] [address]  I had a lengthy discussion with the patient and shared the work-up findings  I spent 20 minutes reviewing the records (labs, clinician notes, outside records, medical history, ordering medicine/tests/procedures, interpreting the imaging/labs previously done) and coordination of care as well as direct time with the patient today, of which greater than 50% of the time was spent in counseling and coordination of care with the patient/family  · Plan/Labs  · Patient is status post 5 cycles of BR 28 day treatment   Current ECOG is still 0   · Cycle 6, day 1 is scheduled for 2/2/2023  This had to be pushed back due to hypertension with patient at prior treatment  Patient will have lab work prior to his upcoming treatment  · Port in place  · Treatment plan:  · Bendamustine 90mg/m2 D1, D2 + Rituxan 375mg/m2 D1 Q28 days  · Supportive therapies:   · Allopurinol 300mg TID  · Zofran 8mg Q8 hours PRN for N/V (has not required thus far)  · EMLA cream 30 minutes prior to port access  · Restage PET-CT scheduled for 2/1/2023  However, given required push back of C6, we will push back PET-CT to at least a few weeks after last cycle  Follow Up: 1 week at PET-CT  F/U MUST be with Dr Oits Laboy to review results  All questions were answered to the patient's satisfaction during this encounter  The patient knows the contact information for our office and knows to reach out for any relevant concerns related to this encounter  They are to call for any temperature 100 4 or higher, new symptoms including but not restricted to shaking chills, decreased appetite, nausea, vomiting, diarrhea, increased fatigue, shortness of breath or chest pain, confusion, and not feeling the strength to come to the clinic  For all other listed problems and medical diagnosis in their chart - they are managed by PCP and/or other specialists, which the patient acknowledges  Thank you very much for your consultation and making us a part of this patient's care  We are continuing to follow closely with you  Please do not hesitate to reach out to me with any additional questions or concerns  AJCC 8th Edition Cancer Stage :      Cancer Staging  No matching staging information was found for the patient  Hematology/Oncology History:   - incidentally diagnosed while doing imaging for aortic aneurysm, showed retroperitoneum and mesenteric lymphadenopathy, largest lymph node about 3 cm, status post biopsy showed follicular lymphoma; grade 1-2;   - 7/2021 :  Developed a big hematoma post biopsy  [place] dropped to 7 8 then stable  -  8/2021 : Hb recovered  PET showed moderate tumor burden  Does not fit GELF criteria for treatment; FLIPI score = 2, age +  Stage 3  PET CT showed low SUV  Decided to observe  - Feb 28, 2022 US Head and neck:Patient's lump in the right submandibular region corresponds to an enlarged, heterogeneous submandibular lymph node measuring 1 8 x 1 1 x 1 9 cm (image 18 ) Review of the prior PET/CT from 8/10/2021 demonstrate an enlarged, hypermetabolic node in this area, therefore this is likely part of the patient's known follicular lymphoma  Difficult to assess interval change because of the difference in imaging modality  - May 12, 2022, CT CAP w/o c with interval increase in mesenteric lymph nodes, new pulmonary nodules  Conglomerate kennedy mass at the root of the small bowel mesentery on image 82 of series 2 measures approximately 12 0 x 7 5 cm, increased from 8 9 x 6 3 cm when measured using similar technique on July 10, 2021   Discrete upper retroperitoneal nodes are slightly increased from July 10, 2021, specifically an aortocaval node measuring 2 9 x 2 3 cm on image 74 of series 2, increased from 2 2 x 2 0 cm on prior exam and an anterior left periaortic node measuring 2 4 x 2 0 cm on image 76 of series 2 increased from 2 1 x 1 7 cm on prior examination    · August 6, 2022, CT abd, pelvis w/o contrast:  · 8 6 cm low-attenuation focus within the spleen which is not present on CT examination of 5/12/2022  Differential considerations include splenic abscess or progression of patient's follicular lymphoma  This finding can be further evaluated with contrast-enhanced MR abdomen  · Progression of upper abdominal lymphadenopathy  Mesenteric and retroperitoneal lymph nodes kennedy burden appears similar to 5/12/2022  · New 2 6 cm cystic focus immediately deep to the umbilicus likely to represent additional disease spread      · August 8, 2022, patient had an EGD showing erythema in antrum  Small proximal 1 cm sliding hiatal hernia  Otherwise unrevealing findings  Biopsies were benign  · August 19, 2022, chronic hepatitis panel was negative  · September 6th, 2022 started cycle 1, day 1 of Bendamustine + Rituxan Q28 days  History of Present Illiness:   Stu Noland is a 78 y o  male with the above-noted HemOnc history who is here  to follow-up regarding history of follicular lymphoma  Patient has been on having ongoing GI complaints  Patient has history of GERD, chest pain likely related to GERD  He also has been having decreased appetite causing some weight loss  He still has sensation of upset stomach  GI physicians spoke with my attending who believes his symptoms could be related to his follicular lymphoma  Patient was started on BR treatment Q 28 days September 6, 2022  Interval history 1/11/2023:  Patient came in for follow up  Offers no specific complaints  No new HA, CP, SOB, abd pain, N/V/D, signs of infection, bleeding anywhere  Doing well on treatment without any noticed side effects  Eating and drinking okay  No fever or chills  No exertional chest pain, diaphoresis or shortness  of breath  No cough and phlegm, no hemoptysis  Patient denied nausea  and vomiting  No weight loss  Improvement in his abdominal pain and gastric reflux symptoms  No  symptoms  No headache or blurred vision  No seizure activity  Appetite good  No significant weight gain  The patient denies bleeding anywhere  ROS: A 12-point of review of systems is obtained and other than the above is noncontributory  Objective:   VITALS:   /82   Pulse 86   Temp (!) 97 3 °F (36 3 °C)   Resp 18   Ht 6' 3" (1 905 m)   Wt (!) 141 kg (310 lb)   SpO2 96%   BMI 38 75 kg/m²     Physical EXAM:  General:  Alert, cooperative, no distress, appears stated age  Head:  Normocephalic, without obvious abnormality, atraumatic     Eyes:  Conjunctivae/corneas clear  PERRL, EOMs intact  No evidence of conjunctivitis     Throat: Lips, mucosa, and tongue normal   No bleeding from mouth  Neck: Supple, symmetrical, trachea midline    Lungs:    Respiratory effort easy, nonlabored    Heart:  Regular rate and rhythm, S1, S2 normal    Abdomen:   Soft, non-tender,nondistended  No masses,  No organomegaly  Extremities:  Lymphatics: Extremities normal, atraumatic, no cyanosis or edema  No cervical, axillary or inguinal adenopathy   Skin: Skin color, texture, turgor normal  No rashes  Neurologic: A&Ox4   No focal neuro deficits       Allergies   Allergen Reactions   • Lisinopril Cough       Past Medical History:   Diagnosis Date   • Chronic kidney disease    • Diabetes mellitus ([facility] [address] )    • [facility]    • High cholesterol    • Hypertension        Past Surgical History:   Procedure Laterality Date   • BUNIONECTOMY Right 11/24/2020    Procedure: RIGHT HAV CORRECTION,;  Surgeon: Eduardo Ruelas DPM;  [facility];  Service: Podiatry   • COLONOSCOPY     • INCISION AND DRAINAGE OF WOUND Right 10/05/2016    Procedure: INCISION AND DRAINAGE (I&D) EXTREMITY;  Surgeon: Eduardo Ruelas DPM;  [facility];  Service:    • IR BIOPSY LYMPH NODE  07/08/2021   • IR EMBOLIZATION (SPECIFY VESSEL OR SITE)  07/08/2021   • IR PORT PLACEMENT  9/1/2022   • PILONIDAL CYST EXCISION     • MO CORRECTION HAMMERTOE Right 02/19/2019    Procedure: THIRD HAMMER TOE CORRECTION;  Surgeon: Eduardo Ruelas DPM;  [facility];  Service: Podiatry   • TOE OSTEOTOMY Right 03/14/2017    Procedure: HAMMERTOE CORRECTION R 2 ;  Surgeon: Eduardo Ruelas DPM;  [facility];  Service:    • TOE OSTEOTOMY Left 11/24/2020    Procedure: LEFT HT CORRECTION TOE;  Surgeon: Eduardo Ruelas DPM;  [facility];  Service: Podiatry   • TONSILLECTOMY  1962       Family History   Problem Relation Age of Onset   • Cancer Father         Leukemia       Social History     Socioeconomic History   • Marital status: /Civil Union     Spouse name: Not on file   • Number of children: Not on file   • Years of education: Not on file   • Highest education level: Not on file   Occupational History   • Not on file   Tobacco Use   • Smoking status: Never   • Smokeless tobacco: Never   • Tobacco comments:     Never smoked but exposed to second hand smoke from birth until 19's   Vaping Use   • Vaping Use: Never used   Substance and Sexual Activity   • Alcohol use: No   • Drug use: No   • Sexual activity: Not Currently     Partners: Female     Birth control/protection: Abstinence   Other Topics Concern   • Not on file   Social History Narrative   • Not on file     Social Determinants of Health     Financial Resource Strain: Not on file   Food Insecurity: Not on file   Transportation Needs: Not on file   Physical Activity: Not on file   Stress: Not on file   Social Connections: Not on file   Intimate Partner Violence: Not on file   Housing Stability: Not on file       Current Outpatient Medications   Medication Sig Dispense Refill   • allopurinol (ZYLOPRIM) 100 mg tablet Take 3 tablets (300 mg total) by mouth daily 30 tablet 1   • amLODIPine (NORVASC) 10 mg tablet take 1 tablet by mouth once daily AT NOON     • aspirin 81 mg chewable tablet Chew 81 mg daily     • famotidine (PEPCID) 40 MG tablet Take 1 tablet (40 mg total) by mouth daily at bedtime 30 tablet 3   • fluticasone (FLONASE) 50 mcg/act nasal spray instill 2 sprays into each nostril one to two times a day     • hydrochlorothiazide (HYDRODIURIL) 25 mg tablet Take 25 mg by mouth daily     • lidocaine-prilocaine (EMLA) cream Apply to port area 30 minutes prior to access 30 g 0   • losartan (COZAAR) 100 MG tablet Take 0 5 tablets (50 mg total) by mouth daily (Patient taking differently: Take 100 mg by mouth daily)     • metFORMIN (GLUCOPHAGE) 500 mg tablet Take 1,000 mg by mouth 2 (two) times a day with meals     • metoprolol succinate (TOPROL-XL) 100 mg 24 hr tablet Take 100 mg by mouth every evening     • metoprolol succinate (TOPROL-XL) 50 mg 24 hr tablet Take 50 mg by mouth every evening     • Multiple Vitamins-Minerals (Centrum Silver 50+Men) TABS      • ondansetron (ZOFRAN) 8 mg tablet Take 1 tablet (8 mg total) by mouth every 8 (eight) hours as needed for nausea or vomiting 30 tablet 2   • simvastatin (ZOCOR) 40 mg tablet Take 40 mg by mouth daily at bedtime       No current facility-administered medications for this visit  (Not in a hospital admission)      DATA REVIEW:    Pathology Result:    Final Diagnosis   Date Value Ref Range Status   08/15/2022   Final    A  Stomach, linear antral erythema:  - Gastric antral mucosa with mild chronic active gastritis and regenerative epithelial changes   - No Helicobacter pylori organisms are identified with immunoperoxidase stain   - Negative for intestinal metaplasia, dysplasia or carcinoma  B  Esophagus, irregular z line 39:  - Squamocolumnar junction mucosa with mild chronic inflammation and regenerative epithelial changes   - No intestinal metaplasia/ dysplasia identified  C  Esophagogastric junction, small nodule ge junction:  - Fragments of esophageal squamous and gastric glandular mucosa with intestinal metaplasia and regenerative epithelial changes  - Intestinal metaplasia identified with Alcian blue/PAS stain   - Pan keratin stain highlights benign epithelial elements   - No dysplasia identified  See note  Note (C): This biopsy shows gastric-type mucosa with scattered goblet cells  The diagnosis in this case depends on the location of this biopsy  If this biopsy was taken from the tubular esophagus at least 1 cm above the gastric folds, it represents Ozuna mucosa of the distinctive type  If this biopsy was taken from the gastric cardia, it represents intestinal metaplasia of the gastric cardia  Reference: Darrian ODOM; [employer] [facility] Gastroenter[facility]    AGC Clinical Guideline: Diagnosis and Management of Ozuna's Esophagus  Juliana Umanzor  2016 Jan;[id number])22-45      07/08/2021   Final    A  Lymph node, periaortic, biopsy:  -  Follicular lymphoma, WHO grade 1-2 (of 3) (see note)       06/28/2021   Final    A  Duodenum:  - Small bowel mucosa with no significant histopathologic abnormality   - Negative for malabsorption pattern  - Negative for malignancy  B  Stomach:  - Gastric mucosa with no significant histopathologic abnormality   - Negative for H  pylori by routine H&E   - Negative for malignancy  C  Esophagus, distal:  - Squamocolumnar mucosa with intestinal metaplasia  See comment   - Negative for dysplasia and malignancy  Comment: The diagnosis in this case depends on the location of this biopsy  If this biopsy was taken from the tubular esophagus at least 1 cm above the gastric folds, it represents Ozuna mucosa  If this biopsy was taken from the gastric cardia, it represents intestinal metaplasia of the gastric cardia  Image Results: They are reviewed and documented in Hematology/Oncology history    CTA chest wo w contrast  Addendum: ADDENDUM:     Comparison to an additional prior study from August 6, 2022 shows that the  lesion in the spleen has decreased in size, now measuring 45 mm x 45 mm,  previously 86 x 86 mm  Findings were conveyed to Ghazal Shore PA-C  Narrative: CT ANGIOGRAM OF THE CHEST, WITH AND WITHOUT IV CONTRAST    INDICATION:   I71 20: Thoracic aortic aneurysm, without rupture, unspecified  History of follicular lymphoma  COMPARISON: CT, dated 5/12/2022  TECHNIQUE:  CT angiogram examination of the chest was performed according to standard protocol  Contrast as well as noncontrast images were obtained     This examination, like all CT scans performed in the [facility], was performed   utilizing techniques to minimize radiation dose exposure, including the use of iterative reconstruction and automated exposure control  3D reconstructions were performed an independent workstation, and are supplied for review  Rad dose 1537 mGy-cm     IV Contrast:  100 mL of iohexol (OMNIPAQUE)     FINDINGS:   THORACIC VASCULAR STRUCTURES:  HEART:  Heart is normal in size  Mild mitral valvular and aortic valvular calcifications  Coronary arterial atherosclerotic calcifications are moderate  PULMONARY ARTERIES:  The main pulmonary artery is normal in size  Nontargeted evaluation shows no central pulmonary embolism  ASCENDING AORTA:  Redemonstrated is aneurysmal dilatation of the ascending thoracic aorta, measuring 44 x 43 mm (series 3, image 76), previously 44 x 40 mm on a study in May 2021  AORTIC ARCH:  The three-vessel arch is normal   DESCENDING THORACIC AORTA:  The descending thoracic aorta is normal in caliber, with no atherosclerotic calcifications  ABDOMINAL VASCULAR STRUCTURES:     CELIAC ARTERY:  Conventional branching anatomy is present  There are mild atherosclerotic calcifications at the ostia  SUPERIOR MESENTERIC ARTERY:  Normal caliber, without atherosclerotic calcifications  RIGHT RENAL ARTERY:  The single renal artery is normal in caliber  LEFT RENAL ARTERY:  The single renal artery is normal in caliber  OTHER FINDINGS:    LUNGS:  Previous pulmonary nodules have resolved  Multiple new pulmonary nodules are present, the largest of which in the left upper lobe measures 10 mm (series 4, image 56)  Remainder of pulmonary nodules are annotated on series 4  Persistent right upper lobe scarring  Bibasilar atelectasis  No focal lung consolidations  Bronchial wall thickening  Central airways are normal     PLEURA:  Unremarkable  MEDIASTINUM AND GABRIEL:  Unremarkable  CHEST WALL AND LOWER NECK:   Right internal jugular venous port terminates at the superior cavoatrial junction      VISUALIZED STRUCTURES IN THE UPPER ABDOMEN:  Scattered lymph nodes in the upper retroperitoneum are incompletely visualized, however have decreased in size from the prior study in May  Splenic lesion now measures 45 x 45 mm (series 3, image 105),   previously 20 x 16 mm  VISUALIZED OSSEOUS STRUCTURES:  No acute fracture or destructive osseous lesion  Impression: 1  Stable ectasia of the ascending thoracic aorta, measuring 44 x 43 mm, as queried  2   Resolution of previous pulmonary nodules from May  Scattered new pulmonary nodules, the largest of which measures 10 mm  Given resolution of prior nodules, these are likely attributed to infection or inflammatory etiologies, however short interval   follow-up in 3-6 months is reasonable  3   Decrease in size of incompletely visualized upper retroperitoneal lymph nodes  Increase in size of a splenic lesion, now measuring 45 mm  The study was marked in EPIC for significant notification  Workstation performed: [id number]        LABS:  Lab data are reviewed and documented in HemOnc history  No results found for this or any previous visit (from the past 48 hour(s))            Bola Bhardwaj  1/11/2023, 10:06 AM

## 2023-01-11 ENCOUNTER — OFFICE VISIT (OUTPATIENT)
Dept: HEMATOLOGY ONCOLOGY | Facility: CLINIC | Age: 68
End: 2023-01-11

## 2023-01-11 VITALS
TEMPERATURE: 97.3 F | HEIGHT: 75 IN | DIASTOLIC BLOOD PRESSURE: 82 MMHG | BODY MASS INDEX: 38.54 KG/M2 | HEART RATE: 86 BPM | RESPIRATION RATE: 18 BRPM | OXYGEN SATURATION: 96 % | WEIGHT: 310 LBS | SYSTOLIC BLOOD PRESSURE: 140 MMHG

## 2023-01-11 DIAGNOSIS — C82.08 FOLLICULAR LYMPHOMA GRADE I OF LYMPH NODES OF MULTIPLE SITES (HCC): Primary | ICD-10-CM

## 2023-01-11 RX ORDER — SODIUM CHLORIDE 9 MG/ML
20 INJECTION, SOLUTION INTRAVENOUS ONCE
OUTPATIENT
Start: 2023-02-03

## 2023-01-11 RX ORDER — SODIUM CHLORIDE 9 MG/ML
20 INJECTION, SOLUTION INTRAVENOUS ONCE
OUTPATIENT
Start: 2023-02-02

## 2023-01-11 RX ORDER — ACETAMINOPHEN 325 MG/1
650 TABLET ORAL ONCE
OUTPATIENT
Start: 2023-02-02

## 2023-01-31 ENCOUNTER — APPOINTMENT (OUTPATIENT)
Dept: LAB | Facility: HOSPITAL | Age: 68
End: 2023-01-31

## 2023-01-31 DIAGNOSIS — R80.8 OTHER PROTEINURIA: ICD-10-CM

## 2023-01-31 DIAGNOSIS — N18.31 STAGE 3A CHRONIC KIDNEY DISEASE (HCC): ICD-10-CM

## 2023-01-31 DIAGNOSIS — N18.30 HYPERTENSIVE KIDNEY DISEASE WITH STAGE 3 CHRONIC KIDNEY DISEASE, UNSPECIFIED WHETHER STAGE 3A OR 3B CKD (HCC): ICD-10-CM

## 2023-01-31 DIAGNOSIS — C82.08 FOLLICULAR LYMPHOMA GRADE I OF LYMPH NODES OF MULTIPLE SITES (HCC): ICD-10-CM

## 2023-01-31 DIAGNOSIS — I12.9 HYPERTENSIVE KIDNEY DISEASE WITH STAGE 3 CHRONIC KIDNEY DISEASE, UNSPECIFIED WHETHER STAGE 3A OR 3B CKD (HCC): ICD-10-CM

## 2023-01-31 LAB
25(OH)D3 SERPL-MCNC: 16.2 NG/ML (ref 30–100)
ALBUMIN SERPL BCP-MCNC: 3.9 G/DL (ref 3.5–5)
ALP SERPL-CCNC: 61 U/L (ref 46–116)
ALT SERPL W P-5'-P-CCNC: 64 U/L (ref 12–78)
ANION GAP SERPL CALCULATED.3IONS-SCNC: 4 MMOL/L (ref 4–13)
AST SERPL W P-5'-P-CCNC: 54 U/L (ref 5–45)
BACTERIA UR QL AUTO: ABNORMAL /HPF
BASOPHILS # BLD AUTO: 0.09 THOUSANDS/ÂΜL (ref 0–0.1)
BASOPHILS NFR BLD AUTO: 1 % (ref 0–1)
BILIRUB SERPL-MCNC: 0.3 MG/DL (ref 0.2–1)
BILIRUB UR QL STRIP: NEGATIVE
BUN SERPL-MCNC: 23 MG/DL (ref 5–25)
CALCIUM SERPL-MCNC: 9.7 MG/DL (ref 8.3–10.1)
CHLORIDE SERPL-SCNC: 106 MMOL/L (ref 96–108)
CLARITY UR: CLEAR
CO2 SERPL-SCNC: 28 MMOL/L (ref 21–32)
COLOR UR: ABNORMAL
CREAT SERPL-MCNC: 1.08 MG/DL (ref 0.6–1.3)
CREAT UR-MCNC: 114 MG/DL
EOSINOPHIL # BLD AUTO: 0.33 THOUSAND/ÂΜL (ref 0–0.61)
EOSINOPHIL NFR BLD AUTO: 5 % (ref 0–6)
ERYTHROCYTE [DISTWIDTH] IN BLOOD BY AUTOMATED COUNT: 14.1 % (ref 11.6–15.1)
GFR SERPL CREATININE-BSD FRML MDRD: 71 ML/MIN/1.73SQ M
GLUCOSE P FAST SERPL-MCNC: 123 MG/DL (ref 65–99)
GLUCOSE UR STRIP-MCNC: NEGATIVE MG/DL
HCT VFR BLD AUTO: 35.6 % (ref 36.5–49.3)
HGB BLD-MCNC: 11.9 G/DL (ref 12–17)
HGB UR QL STRIP.AUTO: NEGATIVE
IMM GRANULOCYTES # BLD AUTO: 0.04 THOUSAND/UL (ref 0–0.2)
IMM GRANULOCYTES NFR BLD AUTO: 1 % (ref 0–2)
KETONES UR STRIP-MCNC: NEGATIVE MG/DL
LEUKOCYTE ESTERASE UR QL STRIP: NEGATIVE
LYMPHOCYTES # BLD AUTO: 0.36 THOUSANDS/ÂΜL (ref 0.6–4.47)
LYMPHOCYTES NFR BLD AUTO: 6 % (ref 14–44)
MCH RBC QN AUTO: 29.8 PG (ref 26.8–34.3)
MCHC RBC AUTO-ENTMCNC: 33.4 G/DL (ref 31.4–37.4)
MCV RBC AUTO: 89 FL (ref 82–98)
MICROALBUMIN UR-MCNC: 1280 MG/L (ref 0–20)
MICROALBUMIN/CREAT 24H UR: 1123 MG/G CREATININE (ref 0–30)
MONOCYTES # BLD AUTO: 0.72 THOUSAND/ÂΜL (ref 0.17–1.22)
MONOCYTES NFR BLD AUTO: 11 % (ref 4–12)
NEUTROPHILS # BLD AUTO: 4.88 THOUSANDS/ÂΜL (ref 1.85–7.62)
NEUTS SEG NFR BLD AUTO: 76 % (ref 43–75)
NITRITE UR QL STRIP: NEGATIVE
NON-SQ EPI CELLS URNS QL MICRO: ABNORMAL /HPF
NRBC BLD AUTO-RTO: 0 /100 WBCS
PH UR STRIP.AUTO: 6 [PH]
PHOSPHATE SERPL-MCNC: 2.8 MG/DL (ref 2.3–4.1)
PLATELET # BLD AUTO: 173 THOUSANDS/UL (ref 149–390)
PMV BLD AUTO: 10 FL (ref 8.9–12.7)
POTASSIUM SERPL-SCNC: 4.4 MMOL/L (ref 3.5–5.3)
PROT SERPL-MCNC: 7.8 G/DL (ref 6.4–8.4)
PROT UR STRIP-MCNC: ABNORMAL MG/DL
PTH-INTACT SERPL-MCNC: 94.3 PG/ML (ref 18.4–80.1)
RBC # BLD AUTO: 4 MILLION/UL (ref 3.88–5.62)
RBC #/AREA URNS AUTO: ABNORMAL /HPF
SODIUM SERPL-SCNC: 138 MMOL/L (ref 135–147)
SP GR UR STRIP.AUTO: 1.02 (ref 1–1.03)
URATE SERPL-MCNC: 6.9 MG/DL (ref 3.5–8.5)
UROBILINOGEN UR STRIP-ACNC: <2 MG/DL
WBC # BLD AUTO: 6.42 THOUSAND/UL (ref 4.31–10.16)
WBC #/AREA URNS AUTO: ABNORMAL /HPF

## 2023-02-01 LAB
ALBUMIN SERPL ELPH-MCNC: 4.1 G/DL (ref 3.5–5)
ALBUMIN SERPL ELPH-MCNC: 56.9 % (ref 52–65)
ALBUMIN UR ELPH-MCNC: 85.8 %
ALPHA1 GLOB MFR UR ELPH: 3 %
ALPHA1 GLOB SERPL ELPH-MCNC: 0.36 G/DL (ref 0.1–0.4)
ALPHA1 GLOB SERPL ELPH-MCNC: 5 % (ref 2.5–5)
ALPHA2 GLOB MFR UR ELPH: 2.3 %
ALPHA2 GLOB SERPL ELPH-MCNC: 1.02 G/DL (ref 0.4–1.2)
ALPHA2 GLOB SERPL ELPH-MCNC: 14.2 % (ref 7–13)
B-GLOBULIN MFR UR ELPH: 5.7 %
BETA GLOB ABNORMAL SERPL ELPH-MCNC: 0.51 G/DL (ref 0.4–0.8)
BETA1 GLOB SERPL ELPH-MCNC: 7.1 % (ref 5–13)
BETA2 GLOB SERPL ELPH-MCNC: 6 % (ref 2–8)
BETA2+GAMMA GLOB SERPL ELPH-MCNC: 0.43 G/DL (ref 0.2–0.5)
GAMMA GLOB ABNORMAL SERPL ELPH-MCNC: 0.78 G/DL (ref 0.5–1.6)
GAMMA GLOB MFR UR ELPH: 3.2 %
GAMMA GLOB SERPL ELPH-MCNC: 10.8 % (ref 12–22)
IGG/ALB SER: 1.32 {RATIO} (ref 1.1–1.8)
PROT PATTERN SERPL ELPH-IMP: ABNORMAL
PROT PATTERN UR ELPH-IMP: ABNORMAL
PROT SERPL-MCNC: 7.2 G/DL (ref 6.4–8.2)
PROT UR-MCNC: 158 MG/DL

## 2023-02-02 ENCOUNTER — HOSPITAL ENCOUNTER (OUTPATIENT)
Dept: INFUSION CENTER | Facility: CLINIC | Age: 68
Discharge: HOME | End: 2023-02-02

## 2023-02-02 VITALS
HEIGHT: 75 IN | BODY MASS INDEX: 37.95 KG/M2 | HEART RATE: 80 BPM | WEIGHT: 305.2 LBS | SYSTOLIC BLOOD PRESSURE: 135 MMHG | DIASTOLIC BLOOD PRESSURE: 70 MMHG | TEMPERATURE: 97.1 F | RESPIRATION RATE: 18 BRPM

## 2023-02-02 DIAGNOSIS — C82.08 FOLLICULAR LYMPHOMA GRADE I OF LYMPH NODES OF MULTIPLE SITES (HCC): Primary | ICD-10-CM

## 2023-02-02 RX ORDER — ACETAMINOPHEN 325 MG/1
650 TABLET ORAL ONCE
Status: COMPLETED | OUTPATIENT
Start: 2023-02-02 | End: 2023-02-02

## 2023-02-02 RX ORDER — SODIUM CHLORIDE 9 MG/ML
20 INJECTION, SOLUTION INTRAVENOUS ONCE
Status: COMPLETED | OUTPATIENT
Start: 2023-02-02 | End: 2023-02-02

## 2023-02-02 RX ADMIN — BENDAMUSTINE HYDROCHLORIDE 234.9 MG: 25 INJECTION, SOLUTION INTRAVENOUS at 13:35

## 2023-02-02 RX ADMIN — ACETAMINOPHEN 650 MG: 325 TABLET, FILM COATED ORAL at 09:38

## 2023-02-02 RX ADMIN — DEXAMETHASONE SODIUM PHOSPHATE: 10 INJECTION, SOLUTION INTRAMUSCULAR; INTRAVENOUS at 09:20

## 2023-02-02 RX ADMIN — SODIUM CHLORIDE 20 ML/HR: 0.9 INJECTION, SOLUTION INTRAVENOUS at 09:18

## 2023-02-02 RX ADMIN — DIPHENHYDRAMINE HYDROCHLORIDE 25 MG: 50 INJECTION, SOLUTION INTRAMUSCULAR; INTRAVENOUS at 09:41

## 2023-02-02 RX ADMIN — RITUXIMAB 978.8 MG: 10 INJECTION, SOLUTION INTRAVENOUS at 10:15

## 2023-02-02 NOTE — PROGRESS NOTES
Pt reports to unit feeling well, offering no complaints today  Instructions to f/u with nephrologist/urologist given to pt, verbalizes understanding  Patient receiving Rituxan and Bendeka, tolerated infusion well without any adverse events  Patient aware of next appointment, Port deaccessed, AVS declined

## 2023-02-02 NOTE — PROGRESS NOTES
Reached out to office regarding urine protein levels, ok to treat per Kaur Infante PA-c  She stated patient she follow up with his urologist or nephrologist   This is his last treatment

## 2023-02-03 ENCOUNTER — HOSPITAL ENCOUNTER (OUTPATIENT)
Dept: INFUSION CENTER | Facility: CLINIC | Age: 68
End: 2023-02-03

## 2023-02-03 ENCOUNTER — TELEPHONE (OUTPATIENT)
Dept: NEPHROLOGY | Facility: CLINIC | Age: 68
End: 2023-02-03

## 2023-02-03 VITALS
HEIGHT: 75 IN | RESPIRATION RATE: 18 BRPM | BODY MASS INDEX: 38.1 KG/M2 | WEIGHT: 306.4 LBS | SYSTOLIC BLOOD PRESSURE: 127 MMHG | DIASTOLIC BLOOD PRESSURE: 67 MMHG | HEART RATE: 70 BPM | TEMPERATURE: 97.1 F

## 2023-02-03 DIAGNOSIS — C82.08 FOLLICULAR LYMPHOMA GRADE I OF LYMPH NODES OF MULTIPLE SITES (HCC): Primary | ICD-10-CM

## 2023-02-03 RX ORDER — SODIUM CHLORIDE 9 MG/ML
20 INJECTION, SOLUTION INTRAVENOUS ONCE
Status: COMPLETED | OUTPATIENT
Start: 2023-02-03 | End: 2023-02-03

## 2023-02-03 RX ADMIN — DEXAMETHASONE SODIUM PHOSPHATE: 10 INJECTION, SOLUTION INTRAMUSCULAR; INTRAVENOUS at 11:06

## 2023-02-03 RX ADMIN — SODIUM CHLORIDE 20 ML/HR: 0.9 INJECTION, SOLUTION INTRAVENOUS at 11:05

## 2023-02-03 RX ADMIN — BENDAMUSTINE HYDROCHLORIDE 234.9 MG: 25 INJECTION, SOLUTION INTRAVENOUS at 11:43

## 2023-02-03 NOTE — TELEPHONE ENCOUNTER
Good Afternoon,    Dr Lorrie Chavez patient of yours called the office in regard of his blood work test results he had done 01/31/2023  As per patient his blood work are abnormal patient would [facility] to know [facility] or he should keep his appointment schedule for 03/08/2023      Please contact patient at [phone]    Thank you

## 2023-02-03 NOTE — PROGRESS NOTES
Pt reports to unit feeling well, offering no complaints today  Patient receiving Bendeka, tolerated infusion well without any adverse events  Office messaged for more treatment orders, patient aware of port flush appointment, Port deaccessed, AVS declined

## 2023-02-06 NOTE — TELEPHONE ENCOUNTER
Called patient LM to advise as per Dr Heath,  kidney functions are okay ,but has some protein in his urine and also low Vitamin D level, but there is nothing urgent, advised as per Iver Libman he will discuss further with his next scheduled appointment  also advised if he has any questions or concerns to call office

## 2023-02-20 ENCOUNTER — HOSPITAL ENCOUNTER (OUTPATIENT)
Dept: RADIOLOGY | Age: 68
Discharge: HOME | End: 2023-02-20

## 2023-02-20 DIAGNOSIS — C82.08 FOLLICULAR LYMPHOMA GRADE I OF LYMPH NODES OF MULTIPLE SITES (HCC): ICD-10-CM

## 2023-02-20 LAB — GLUCOSE SERPL-MCNC: 124 MG/DL (ref 65–140)

## 2023-02-20 NOTE — H&P (VIEW-ONLY)
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     Hem/Onc Problem List:     1  Follicular lymphoma of intra-abdominal lymph nodes, unspecified follicular lymphoma type [facility])    Chief Complaint:    Follow up after chemotherapy treatments     Assessment/Plan:   1  Follicular Lymphoma, WHO grade 1-2, initially diagnosed 07/8/2021  Patient was on observation and had no constitutional symptoms  Recently, he developed complaint of chest pain likely related to epigastric pain with burning sensation  He then began having discomfort in the abdomen in other areas  Also began having diarrhea  He had extensive workup from GI which was unrevealing  GI believed he had symptoms related to his follicular lymphoma  As a result, treatment was indicated  Patient started BR treatment Q 28 days on September 6, 2022  6 cycles given  Restaging PET/CT 2/20/2023 showed no metabolic activity  · Discussion of decision making    I personally reviewed the following lab results, the image studies, pathology, other specialty/physicians consult notes and recommendations, and outside medical records from [address] [place] [address]  I had a lengthy discussion with the patient and shared the work-up findings  I spent 33 minutes reviewing the records (labs, clinician notes, outside records, medical history, ordering medicine/tests/procedures, interpreting the imaging/labs previously done) and coordination of care as well as direct time with the patient today, of which greater than 50% of the time was spent in counseling and coordination of care with the patient/family      · Plan/Labs  · Port can be removed and IR referral placed  · Can decrease Allopurinol from 300mg TID to 100mg   · Restaging CT CAP w/c in 6 months    Follow Up: 6 months    All questions were answered to the patient's satisfaction during this encounter  The patient knows the contact information for our office and knows to reach out for any relevant concerns related to this encounter  They are to call for any temperature 100 4 or higher, new symptoms including but not restricted to shaking chills, decreased appetite, nausea, vomiting, diarrhea, increased fatigue, shortness of breath or chest pain, confusion, and not feeling the strength to come to the clinic  For all other listed problems and medical diagnosis in their chart - they are managed by PCP and/or other specialists, which the patient acknowledges  Thank you very much for your consultation and making us a part of this patient's care  We are continuing to follow closely with you  Please do not hesitate to reach out to me with any additional questions or concerns  Florence López MD  Physician, Medical Oncology-Hematology    AJCC 8th Edition Cancer Stage :      Cancer Staging  No matching staging information was found for the patient  Hematology/Oncology History:   - incidentally diagnosed while doing imaging for aortic aneurysm, showed retroperitoneum and mesenteric lymphadenopathy, largest lymph node about 3 cm, status post biopsy showed follicular lymphoma; grade 1-2;   - 7/2021 :  Developed a big hematoma post biopsy   Hospitalized   Hemoglobin dropped to 7 8 then stable  -  8/2021 : Hb recovered  PET showed moderate tumor burden  Does not fit GELF criteria for treatment; FLIPI score = 2, age +  Stage 3  PET CT showed low SUV  Decided to observe     - Feb 28, 2022 US Head and neck:Patient's lump in the right submandibular region corresponds to an enlarged, heterogeneous submandibular lymph node measuring 1 8 x 1 1 x 1 9 cm (image 18 ) Review of the prior PET/CT from 8/10/2021 demonstrate an enlarged, hypermetabolic node in this area, therefore this is likely part of the patient's known follicular lymphoma  Difficult to assess interval change because of the difference in imaging modality  - May 12, 2022, CT CAP w/o c with interval increase in mesenteric lymph nodes, new pulmonary nodules  Conglomerate kennedy mass at the root of the small bowel mesentery on image 82 of series 2 measures approximately 12 0 x 7 5 cm, increased from 8 9 x 6 3 cm when measured using similar technique on July 10, 2021   Discrete upper retroperitoneal nodes are slightly increased from July 10, 2021, specifically an aortocaval node measuring 2 9 x 2 3 cm on image 74 of series 2, increased from 2 2 x 2 0 cm on prior exam and an anterior left periaortic node measuring 2 4 x 2 0 cm on image 76 of series 2 increased from 2 1 x 1 7 cm on prior examination    · August 6, 2022, CT abd, pelvis w/o contrast:  · 8 6 cm low-attenuation focus within the spleen which is not present on CT examination of 5/12/2022  Differential considerations include splenic abscess or progression of patient's follicular lymphoma  This finding can be further evaluated with contrast-enhanced MR abdomen  · Progression of upper abdominal lymphadenopathy  Mesenteric and retroperitoneal lymph nodes kennedy burden appears similar to 5/12/2022  · New 2 6 cm cystic focus immediately deep to the umbilicus likely to represent additional disease spread  · August 8, 2022, patient had an EGD showing erythema in antrum  Small proximal 1 cm sliding hiatal hernia  Otherwise unrevealing findings  Biopsies were benign  · August 19, 2022, chronic hepatitis panel was negative  · September 6th, 2022 started cycle 1, day 1 of Bendamustine + Rituxan Q28 days and completed 6 cycles  · 2/20/2023 PET/CT: Retroperitoneal, iliac chain, and inguinal lymphadenopathy as described  However, the lymph nodes are not significantly metabolically active on PET,  with uptake less than the mediastinal blood pool (Deauville score 2)   Large low-attenuation area within the spleen measuring 3 5 x 3 9 cm measures smaller compared to the CT of 11/21/2022 and is not metabolically active on PET    History of Present Illiness:   Phyllis Moses is a 78 y o  male with the above-noted HemOnc history who is here  to follow-up regarding history of follicular lymphoma  Patient has history of GERD, chest pain likely related to GERD  He also has been having decreased appetite causing some weight loss  He still has sensation of upset stomach  GI physicians spoke with my attending who believes his symptoms could be related to his follicular lymphoma  Patient was started on BR treatment Q 28 days September 6, 2022  Interval history   No acute issues, feels very good    Denies F/C, night sweats, N/V, SOB, CP, LH, HA, rash, itching, gen weakness, melena, hematuria, hematochezia, LOC, falls, diarrhea, or constipation      ROS: A 10-point of review of systems is obtained and other than the above is noncontributory  Objective:   VITALS:   /70 (BP Location: Left arm, Patient Position: Sitting, Cuff Size: Adult)   Pulse 78   Temp 98 °F (36 7 °C)   Resp 18   Ht 6' 3" (1 905 m)   Wt (!) 142 kg (314 lb)   SpO2 98%   BMI 39 25 kg/m²     Physical EXAM:  General:  Alert, cooperative, no distress, appears stated age  Head:  Normocephalic, without obvious abnormality, atraumatic  Eyes:  Conjunctivae/corneas clear  EOMs intact  No evidence of conjunctivitis     Throat: Lips, mucosa, and tongue normal   No bleeding from mouth  Neck: Supple, symmetrical, trachea midline    Lungs:    Respiratory effort easy, nonlabored    Heart:  Regular rate and rhythm, +S1, S2     Abdomen:   Soft, non-tender,nondistended  Extremities:  Lymphatics: Extremities normal, atraumatic, no cyanosis or edema  No cervical, axillary or inguinal adenopathy   Skin: Skin color, texture, turgor normal  No rashes  Neurologic: AAO   No focal neuro deficits       Allergies   Allergen Reactions   • Lisinopril Cough       Past Medical History:   Diagnosis Date   • Chronic kidney disease    • Diabetes mellitus ([facility] [address] )    • Follicular lymphoma ([facility] [address] )    • High cholesterol    • Hypertension        Past Surgical History:   Procedure Laterality Date   • BUNIONECTOMY Right 11/24/2020    Procedure: RIGHT HAV CORRECTION,;  Surgeon: Neel Fay DPM;  [facility]: [facility];  Service: Podiatry   • COLONOSCOPY     • INCISION AND DRAINAGE OF WOUND Right 10/05/2016    Procedure: INCISION AND DRAINAGE (I&D) EXTREMITY;  Surgeon: Neel Fay DPM;  Location[facility];  Service:    • IR BIOPSY LYMPH NODE  07/08/2021   • IR EMBOLIZATION (SPECIFY VESSEL OR SITE)  07/08/2021   • IR PORT PLACEMENT  9/1/2022   • PILONIDAL CYST EXCISION     • ME CORRECTION HAMMERTOE Right 02/19/2019    Procedure: THIRD HAMMER TOE CORRECTION;  Surgeon: Neel Fay DPM;  [facility];  Service: Podiatry   • TOE OSTEOTOMY Right 03/14/2017    Procedure: HAMMERTOE CORRECTION R 2 ;  Surgeon: Neel Fay DPM;  [facility];  Service:    • TOE OSTEOTOMY Left 11/24/2020    Procedure: LEFT HT CORRECTION TOE;  Surgeon: Neel Fay DPM;  [facility];  Service: Podiatry   • TONSILLECTOMY  1962       Family History   Problem Relation Age of Onset   • Cancer Father         Leukemia       Social History     Socioeconomic History   • Marital status: /Civil Union     Spouse name: Not on file   • Number of children: Not on file   • Years of education: Not on file   • Highest education level: Not on file   Occupational History   • Not on file   Tobacco Use   • Smoking status: Never   • Smokeless tobacco: Never   • Tobacco comments:     Never smoked but exposed to second hand smoke from birth until 19's   Vaping Use   • Vaping Use: Never used   Substance and Sexual Activity   • Alcohol use: No   • Drug use: No   • Sexual activity: Not Currently     Partners: Female     Birth control/protection: Abstinence   Other Topics Concern   • Not on file   Social History Narrative   • Not on file     Social Determinants of Health     Financial Resource Strain: Not on file   Food Insecurity: Not on file   Transportation Needs: Not on file   Physical Activity: Not on file   Stress: Not on file   Social Connections: Not on file   Intimate Partner Violence: Not on file   Housing Stability: Not on file       Current Outpatient Medications   Medication Sig Dispense Refill   • amLODIPine (NORVASC) 10 mg tablet take 1 tablet by mouth once daily AT NOON     • aspirin 81 mg chewable tablet Chew 81 mg daily     • fluticasone (FLONASE) 50 mcg/act nasal spray instill 2 sprays into each nostril one to two times a day     • hydrochlorothiazide (HYDRODIURIL) 25 mg tablet Take 25 mg by mouth daily     • lidocaine-prilocaine (EMLA) cream Apply to port area 30 minutes prior to access 30 g 0   • losartan (COZAAR) 100 MG tablet Take 0 5 tablets (50 mg total) by mouth daily (Patient taking differently: Take 100 mg by mouth daily)     • metFORMIN (GLUCOPHAGE) 500 mg tablet Take 1,000 mg by mouth 2 (two) times a day with meals     • metoprolol succinate (TOPROL-XL) 100 mg 24 hr tablet Take 100 mg by mouth every evening     • metoprolol succinate (TOPROL-XL) 50 mg 24 hr tablet Take 50 mg by mouth every evening     • Multiple Vitamins-Minerals (Centrum Silver 50+Men) TABS      • simvastatin (ZOCOR) 40 mg tablet Take 40 mg by mouth daily at bedtime     • allopurinol (ZYLOPRIM) 100 mg tablet Take 3 tablets (300 mg total) by mouth daily 30 tablet 1   • famotidine (PEPCID) 40 MG tablet Take 1 tablet (40 mg total) by mouth daily at bedtime 30 tablet 3   • ondansetron (ZOFRAN) 8 mg tablet Take 1 tablet (8 mg total) by mouth every 8 (eight) hours as needed for nausea or vomiting 30 tablet 2     No current facility-administered medications for this visit         (Not in a hospital admission)      DATA REVIEW:    Pathology Result:    Final Diagnosis   Date Value Ref Range Status   08/15/2022 Final    A  Stomach, linear antral erythema:  - Gastric antral mucosa with mild chronic active gastritis and regenerative epithelial changes   - No Helicobacter pylori organisms are identified with immunoperoxidase stain   - Negative for intestinal metaplasia, dysplasia or carcinoma  B  Esophagus, irregular z line 39:  - Squamocolumnar junction mucosa with mild chronic inflammation and regenerative epithelial changes   - No intestinal metaplasia/ dysplasia identified  C  Esophagogastric junction, small nodule ge junction:  - Fragments of esophageal squamous and gastric glandular mucosa with intestinal metaplasia and regenerative epithelial changes  - Intestinal metaplasia identified with Alcian blue/PAS stain   - Pan keratin stain highlights benign epithelial elements   - No dysplasia identified  See note  Note (C): This biopsy shows gastric-type mucosa with scattered goblet cells  The diagnosis in this case depends on the location of this biopsy  If this biopsy was taken from the tubular esophagus at least 1 cm above the gastric folds, it represents Ozuna mucosa of the distinctive type  If this biopsy was taken from the gastric cardia, it represents intestinal metaplasia of the gastric cardia  Reference: Arnaldo Cope LB; [employer] [facility]astro[facility]  [facility] Clinical Guideline: Diagnosis and Management of Ozuna's Esophagus  Juliana Fraser  2016 Jan;[phone]      07/08/2021   Final    A  Lymph node, periaortic, biopsy:  -  Follicular lymphoma, WHO grade 1-2 (of 3) (see note)       06/28/2021   Final    A  Duodenum:  - Small bowel mucosa with no significant histopathologic abnormality   - Negative for malabsorption pattern  - Negative for malignancy  B  Stomach:  - Gastric mucosa with no significant histopathologic abnormality   - Negative for H  pylori by routine H&E   - Negative for malignancy       C  Esophagus, distal:  - Squamocolumnar mucosa with intestinal metaplasia  See comment   - Negative for dysplasia and malignancy  Comment: The diagnosis in this case depends on the location of this biopsy  If this biopsy was taken from the tubular esophagus at least 1 cm above the gastric folds, it represents Ozuna mucosa  If this biopsy was taken from the gastric cardia, it represents intestinal metaplasia of the gastric cardia  Image Results: They are reviewed and documented in Hematology/Oncology history    NM PET CT skull base to mid thigh  Narrative: PET/CT SCAN    INDICATION: V93 75: Follicular lymphoma grade i, lymph nodes of multiple sites    MODIFIER: PI PS     COMPARISON: CTA chest 11/21/2022, PET/CT 8/10/2021, CT abdomen/pelvis 8/6/2022, CT chest/abdomen/pelvis 5/12/2022    CELL TYPE:  Follicular lymphoma, para-aortic lymph node biopsy performed 7/8/2021    TECHNIQUE:   10 6 mCi F-18-FDG administered IV  Multiplanar attenuation corrected and non attenuation corrected PET images are available for interpretation, and contiguous, low dose, axial CT sections were obtained from the skull base through the femurs  Intravenous contrast material was not utilized  This examination, like all CT scans performed in the [facility], was performed utilizing techniques to minimize radiation dose exposure, including the use of iterative reconstruction and   automated exposure control  Fasting serum glucose: 124 mg/dl    FINDINGS:     VISUALIZED BRAIN:     No acute abnormalities are seen  HEAD/NECK:     There is a physiologic distribution of FDG  No FDG avid cervical adenopathy is seen  CT images: Unremarkable  CHEST:     No FDG avid soft tissue lesions are seen  Linear atelectasis/scarring within the right lung apex  Interval resolution of the previously noted pulmonary nodules, including the 10 mm left upper lobe pulmonary nodule  CT images: Right chest port catheter tip terminating near the cavoatrial junction    Mild bilateral gynecomastia  Coronary artery calcifications  Top normal heart size  Similar ectasia of the ascending thoracic aorta measuring 42 mm allowing for   differences in technique  ABDOMEN:     There are multiple mildly enlarged lymph nodes in the retroperitoneum, iliac chains, and inguinal regions, all which have uptake less than the mediastinal blood pool (Deauville score 2)  Examples include:    1 5 x 1 7 cm left para-aortic lymph node (SUV max 1 5, series 3, image 192)  1 5 x 2 0 cm lymph node at the right common/external iliac bifurcation (SUV max 1 5, series 3, image 227)  1 4 x 1 6 cm proximal right external iliac lymph node (SUV max 1 5, series 3, image 233)  1 1 x 1 7 cm left external iliac lymph node (SUV max 2 0, series 3, image 239)  1 3 x 2 3 cm right external iliac/obturator lymph node (SUV max 1 8, series 3, image 245)  1 6 x 3 1 cm left external iliac/obturator lymph node (SUV max 1 7, series 3, image 250)  1 1 x 1 6 cm right inguinal lymph node (SUV max 1 1 series 3, image 254)  1 1 x 1 8 cm left inguinal lymph node (SUV max 1 1 series 3, image 266)  CT images: Cholelithiasis  Stable punctate focus of calcification in the lower pole of the left kidney  No hydronephrosis  Large low-attenuation area within the spleen measuring 3 5 x 3 9 cm measures smaller compared to the CT of 11/21/2022 and is not   metabolically active on PET; this area is not well characterized due to streak artifact in this region  PELVIS:   No FDG avid soft tissue lesions are seen  CT images: Surgical clips left lower lumbar spine  Degenerative changes of the imaged spine  Scattered small benign bone islands in the pelvis and proximal right femur  OSSEOUS STRUCTURES:  No FDG avid lesions are seen  CT images: No significant findings  Reference values:    Mediastinal blood pool: SUV max 2 8  Liver: SUV max 4 3  Impression: Retroperitoneal, iliac chain, and inguinal lymphadenopathy as described  However, the lymph nodes are not significantly metabolically active on PET,  with uptake less than the mediastinal blood pool (Deauville score 2)  Large low-attenuation area within the spleen measuring 3 5 x 3 9 cm measures smaller compared to the CT of 11/21/2022 and is not metabolically active on PET; this area is not well characterized due to streak artifact in this region  Recommend follow-up   contrast-enhanced CT in 3-6 months for reassessment  Workstation performed: [id number]        LABS:  Lab data are reviewed and documented in HemOnc history  No results found for this or any previous visit (from the past 48 hour(s))            Naomi Cummings  3/2/2023, 2:44 PM

## 2023-02-20 NOTE — PROGRESS NOTES
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     Hem/Onc Problem List:     1  Follicular lymphoma of intra-abdominal lymph nodes, unspecified follicular lymphoma type [facility])    Chief Complaint:    Follow up after chemotherapy treatments     Assessment/Plan:   1  Follicular Lymphoma, WHO grade 1-2, initially diagnosed 07/8/2021  Patient was on observation and had no constitutional symptoms  Recently, he developed complaint of chest pain likely related to epigastric pain with burning sensation  He then began having discomfort in the abdomen in other areas  Also began having diarrhea  He had extensive workup from GI which was unrevealing  GI believed he had symptoms related to his follicular lymphoma  As a result, treatment was indicated  Patient started BR treatment Q 28 days on September 6, 2022  6 cycles given  Restaging PET/CT 2/20/2023 showed no metabolic activity  · Discussion of decision making    I personally reviewed the following lab results, the image studies, pathology, other specialty/physicians consult notes and recommendations, and outside medical records from [address] [place] [address]  I had a lengthy discussion with the patient and shared the work-up findings  I spent 33 minutes reviewing the records (labs, clinician notes, outside records, medical history, ordering medicine/tests/procedures, interpreting the imaging/labs previously done) and coordination of care as well as direct time with the patient today, of which greater than 50% of the time was spent in counseling and coordination of care with the patient/family      · Plan/Labs  · Port can be removed and IR referral placed  · Can decrease Allopurinol from 300mg TID to 100mg   · Restaging CT CAP w/c in 6 months    Follow Up: 6 months    All questions were answered to the patient's satisfaction during this encounter  The patient knows the contact information for our office and knows to reach out for any relevant concerns related to this encounter  They are to call for any temperature 100 4 or higher, new symptoms including but not restricted to shaking chills, decreased appetite, nausea, vomiting, diarrhea, increased fatigue, shortness of breath or chest pain, confusion, and not feeling the strength to come to the clinic  For all other listed problems and medical diagnosis in their chart - they are managed by PCP and/or other specialists, which the patient acknowledges  Thank you very much for your consultation and making us a part of this patient's care  We are continuing to follow closely with you  Please do not hesitate to reach out to me with any additional questions or concerns  Morgan Garg MD  Physician, Medical Oncology-Hematology    AJCC 8th Edition Cancer Stage :      Cancer Staging  No matching staging information was found for the patient  Hematology/Oncology History:   - incidentally diagnosed while doing imaging for aortic aneurysm, showed retroperitoneum and mesenteric lymphadenopathy, largest lymph node about 3 cm, status post biopsy showed follicular lymphoma; grade 1-2;   - 7/2021 :  Developed a big hematoma post biopsy   Hospitalized   Hemoglobin dropped to 7 8 then stable  -  8/2021 : Hb recovered  PET showed moderate tumor burden  Does not fit GELF criteria for treatment; FLIPI score = 2, age +  Stage 3  PET CT showed low SUV  Decided to observe     - Feb 28, 2022 US Head and neck:Patient's lump in the right submandibular region corresponds to an enlarged, heterogeneous submandibular lymph node measuring 1 8 x 1 1 x 1 9 cm (image 18 ) Review of the prior PET/CT from 8/10/2021 demonstrate an enlarged, hypermetabolic node in this area, therefore this is likely part of the patient's known follicular lymphoma  Difficult to assess interval change because of the difference in imaging modality  - May 12, 2022, CT CAP w/o c with interval increase in mesenteric lymph nodes, new pulmonary nodules  Conglomerate kennedy mass at the root of the small bowel mesentery on image 82 of series 2 measures approximately 12 0 x 7 5 cm, increased from 8 9 x 6 3 cm when measured using similar technique on July 10, 2021   Discrete upper retroperitoneal nodes are slightly increased from July 10, 2021, specifically an aortocaval node measuring 2 9 x 2 3 cm on image 74 of series 2, increased from 2 2 x 2 0 cm on prior exam and an anterior left periaortic node measuring 2 4 x 2 0 cm on image 76 of series 2 increased from 2 1 x 1 7 cm on prior examination    · August 6, 2022, CT abd, pelvis w/o contrast:  · 8 6 cm low-attenuation focus within the spleen which is not present on CT examination of 5/12/2022  Differential considerations include splenic abscess or progression of patient's follicular lymphoma  This finding can be further evaluated with contrast-enhanced MR abdomen  · Progression of upper abdominal lymphadenopathy  Mesenteric and retroperitoneal lymph nodes kennedy burden appears similar to 5/12/2022  · New 2 6 cm cystic focus immediately deep to the umbilicus likely to represent additional disease spread  · August 8, 2022, patient had an EGD showing erythema in antrum  Small proximal 1 cm sliding hiatal hernia  Otherwise unrevealing findings  Biopsies were benign  · August 19, 2022, chronic hepatitis panel was negative  · September 6th, 2022 started cycle 1, day 1 of Bendamustine + Rituxan Q28 days and completed 6 cycles  · 2/20/2023 PET/CT: Retroperitoneal, iliac chain, and inguinal lymphadenopathy as described  However, the lymph nodes are not significantly metabolically active on PET,  with uptake less than the mediastinal blood pool (Deauville score 2)   Large low-attenuation area within the spleen measuring 3 5 x 3 9 cm measures smaller compared to the CT of 11/21/2022 and is not metabolically active on PET    History of Present Illiness:   Ambar Potter is a 78 y o  male with the above-noted HemOnc history who is here  to follow-up regarding history of follicular lymphoma  Patient has history of GERD, chest pain likely related to GERD  He also has been having decreased appetite causing some weight loss  He still has sensation of upset stomach  GI physicians spoke with my attending who believes his symptoms could be related to his follicular lymphoma  Patient was started on BR treatment Q 28 days September 6, 2022  Interval history   No acute issues, feels very good    Denies F/C, night sweats, N/V, SOB, CP, LH, HA, rash, itching, gen weakness, melena, hematuria, hematochezia, LOC, falls, diarrhea, or constipation      ROS: A 10-point of review of systems is obtained and other than the above is noncontributory  Objective:   VITALS:   /70 (BP Location: Left arm, Patient Position: Sitting, Cuff Size: Adult)   Pulse 78   Temp 98 °F (36 7 °C)   Resp 18   Ht 6' 3" (1 905 m)   Wt (!) 142 kg (314 lb)   SpO2 98%   BMI 39 25 kg/m²     Physical EXAM:  General:  Alert, cooperative, no distress, appears stated age  Head:  Normocephalic, without obvious abnormality, atraumatic  Eyes:  Conjunctivae/corneas clear  EOMs intact  No evidence of conjunctivitis     Throat: Lips, mucosa, and tongue normal   No bleeding from mouth  Neck: Supple, symmetrical, trachea midline    Lungs:    Respiratory effort easy, nonlabored    Heart:  Regular rate and rhythm, +S1, S2     Abdomen:   Soft, non-tender,nondistended  Extremities:  Lymphatics: Extremities normal, atraumatic, no cyanosis or edema  No cervical, axillary or inguinal adenopathy   Skin: Skin color, texture, turgor normal  No rashes  Neurologic: AAO   No focal neuro deficits       Allergies   Allergen Reactions   • Lisinopril Cough       Past Medical History:   Diagnosis Date   • Chronic kidney disease    • Diabetes mellitus ([facility] [address] )    • Follicular lymphoma ([facility] [address] )    • High cholesterol    • Hypertension        Past Surgical History:   Procedure Laterality Date   • BUNIONECTOMY Right 11/24/2020    Procedure: RIGHT HAV CORRECTION,;  Surgeon: Mónica Ramires DPM;  [facility];  Service: Podiatry   • COLONOSCOPY     • INCISION AND DRAINAGE OF WOUND Right 10/05/2016    Procedure: INCISION AND DRAINAGE (I&D) EXTREMITY;  Surgeon: Mónica Ramires DPM;  [facility];  Service:    • IR BIOPSY LYMPH NODE  07/08/2021   • IR EMBOLIZATION (SPECIFY VESSEL OR SITE)  07/08/2021   • IR PORT PLACEMENT  9/1/2022   • PILONIDAL CYST EXCISION     • CT CORRECTION HAMMERTOE Right 02/19/2019    Procedure: THIRD HAMMER TOE CORRECTION;  Surgeon: Mónica Ramires DPM;  [facility];  Service: Podiatry   • TOE OSTEOTOMY Right 03/14/2017    Procedure: HAMMERTOE CORRECTION R 2 ;  Surgeon: Mónica Ramires DPM;  [facility];  Service:    • TOE OSTEOTOMY Left 11/24/2020    Procedure: LEFT HT CORRECTION TOE;  Surgeon: Mónica Ramires DPM;  [facility];  Service: Podiatry   • TONSILLECTOMY  1962       Family History   Problem Relation Age of Onset   • Cancer Father         Leukemia       Social History     Socioeconomic History   • Marital status: /Civil Union     Spouse name: Not on file   • Number of children: Not on file   • Years of education: Not on file   • Highest education level: Not on file   Occupational History   • Not on file   Tobacco Use   • Smoking status: Never   • Smokeless tobacco: Never   • Tobacco comments:     Never smoked but exposed to second hand smoke from birth until 19's   Vaping Use   • Vaping Use: Never used   Substance and Sexual Activity   • Alcohol use: No   • Drug use: No   • Sexual activity: Not Currently     Partners: Female     Birth control/protection: Abstinence   Other Topics Concern   • Not on file   Social History Narrative   • Not on file     Social Determinants of Health     Financial Resource Strain: Not on file   Food Insecurity: Not on file   Transportation Needs: Not on file   Physical Activity: Not on file   Stress: Not on file   Social Connections: Not on file   Intimate Partner Violence: Not on file   Housing Stability: Not on file       Current Outpatient Medications   Medication Sig Dispense Refill   • amLODIPine (NORVASC) 10 mg tablet take 1 tablet by mouth once daily AT NOON     • aspirin 81 mg chewable tablet Chew 81 mg daily     • fluticasone (FLONASE) 50 mcg/act nasal spray instill 2 sprays into each nostril one to two times a day     • hydrochlorothiazide (HYDRODIURIL) 25 mg tablet Take 25 mg by mouth daily     • lidocaine-prilocaine (EMLA) cream Apply to port area 30 minutes prior to access 30 g 0   • losartan (COZAAR) 100 MG tablet Take 0 5 tablets (50 mg total) by mouth daily (Patient taking differently: Take 100 mg by mouth daily)     • metFORMIN (GLUCOPHAGE) 500 mg tablet Take 1,000 mg by mouth 2 (two) times a day with meals     • metoprolol succinate (TOPROL-XL) 100 mg 24 hr tablet Take 100 mg by mouth every evening     • metoprolol succinate (TOPROL-XL) 50 mg 24 hr tablet Take 50 mg by mouth every evening     • Multiple Vitamins-Minerals (Centrum Silver 50+Men) TABS      • simvastatin (ZOCOR) 40 mg tablet Take 40 mg by mouth daily at bedtime     • allopurinol (ZYLOPRIM) 100 mg tablet Take 3 tablets (300 mg total) by mouth daily 30 tablet 1   • famotidine (PEPCID) 40 MG tablet Take 1 tablet (40 mg total) by mouth daily at bedtime 30 tablet 3   • ondansetron (ZOFRAN) 8 mg tablet Take 1 tablet (8 mg total) by mouth every 8 (eight) hours as needed for nausea or vomiting 30 tablet 2     No current facility-administered medications for this visit         (Not in a hospital admission)      DATA REVIEW:    Pathology Result:    Final Diagnosis   Date Value Ref Range Status   08/15/2022 Final    A  Stomach, linear antral erythema:  - Gastric antral mucosa with mild chronic active gastritis and regenerative epithelial changes   - No Helicobacter pylori organisms are identified with immunoperoxidase stain   - Negative for intestinal metaplasia, dysplasia or carcinoma  B  Esophagus, irregular z line 39:  - Squamocolumnar junction mucosa with mild chronic inflammation and regenerative epithelial changes   - No intestinal metaplasia/ dysplasia identified  C  Esophagogastric junction, small nodule ge junction:  - Fragments of esophageal squamous and gastric glandular mucosa with intestinal metaplasia and regenerative epithelial changes  - Intestinal metaplasia identified with Alcian blue/PAS stain   - Pan keratin stain highlights benign epithelial elements   - No dysplasia identified  See note  Note (C): This biopsy shows gastric-type mucosa with scattered goblet cells  The diagnosis in this case depends on the location of this biopsy  If this biopsy was taken from the tubular esophagus at least 1 cm above the gastric folds, it represents Ozuna mucosa of the distinctive type  If this biopsy was taken from the gastric cardia, it represents intestinal metaplasia of the gastric cardia  Reference: Phyllis ODOM; [employer] [facility]astroenter[facility]  [facility] Clinical Guideline: Diagnosis and Management of Ozuna's Esophagus  Juliana Burks  2016 Jan;[id number]      07/08/2021   Final    A  Lymph node, periaortic, biopsy:  -  Follicular lymphoma, WHO grade 1-2 (of 3) (see note)       06/28/2021   Final    A  Duodenum:  - Small bowel mucosa with no significant histopathologic abnormality   - Negative for malabsorption pattern  - Negative for malignancy  B  Stomach:  - Gastric mucosa with no significant histopathologic abnormality   - Negative for H  pylori by routine H&E   - Negative for malignancy       C  Esophagus, distal:  - Squamocolumnar mucosa with intestinal metaplasia  See comment   - Negative for dysplasia and malignancy  Comment: The diagnosis in this case depends on the location of this biopsy  If this biopsy was taken from the tubular esophagus at least 1 cm above the gastric folds, it represents Ozuna mucosa  If this biopsy was taken from the gastric cardia, it represents intestinal metaplasia of the gastric cardia  Image Results: They are reviewed and documented in Hematology/Oncology history    NM PET CT skull base to mid thigh  Narrative: PET/CT SCAN    INDICATION: J09 30: Follicular lymphoma grade i, lymph nodes of multiple sites    MODIFIER: PI PS     COMPARISON: CTA chest 11/21/2022, PET/CT 8/10/2021, CT abdomen/pelvis 8/6/2022, CT chest/abdomen/pelvis 5/12/2022    CELL TYPE:  Follicular lymphoma, para-aortic lymph node biopsy performed 7/8/2021    TECHNIQUE:   10 6 mCi F-18-FDG administered IV  Multiplanar attenuation corrected and non attenuation corrected PET images are available for interpretation, and contiguous, low dose, axial CT sections were obtained from the skull base through the femurs  Intravenous contrast material was not utilized  This examination, like all CT scans performed in the [facility], was performed utilizing techniques to minimize radiation dose exposure, including the use of iterative reconstruction and   automated exposure control  Fasting serum glucose: 124 mg/dl    FINDINGS:     VISUALIZED BRAIN:     No acute abnormalities are seen  HEAD/NECK:     There is a physiologic distribution of FDG  No FDG avid cervical adenopathy is seen  CT images: Unremarkable  CHEST:     No FDG avid soft tissue lesions are seen  Linear atelectasis/scarring within the right lung apex  Interval resolution of the previously noted pulmonary nodules, including the 10 mm left upper lobe pulmonary nodule  CT images: Right chest port catheter tip terminating near the cavoatrial junction    Mild bilateral gynecomastia  Coronary artery calcifications  Top normal heart size  Similar ectasia of the ascending thoracic aorta measuring 42 mm allowing for   differences in technique  ABDOMEN:     There are multiple mildly enlarged lymph nodes in the retroperitoneum, iliac chains, and inguinal regions, all which have uptake less than the mediastinal blood pool (Deauville score 2)  Examples include:    1 5 x 1 7 cm left para-aortic lymph node (SUV max 1 5, series 3, image 192)  1 5 x 2 0 cm lymph node at the right common/external iliac bifurcation (SUV max 1 5, series 3, image 227)  1 4 x 1 6 cm proximal right external iliac lymph node (SUV max 1 5, series 3, image 233)  1 1 x 1 7 cm left external iliac lymph node (SUV max 2 0, series 3, image 239)  1 3 x 2 3 cm right external iliac/obturator lymph node (SUV max 1 8, series 3, image 245)  1 6 x 3 1 cm left external iliac/obturator lymph node (SUV max 1 7, series 3, image 250)  1 1 x 1 6 cm right inguinal lymph node (SUV max 1 1 series 3, image 254)  1 1 x 1 8 cm left inguinal lymph node (SUV max 1 1 series 3, image 266)  CT images: Cholelithiasis  Stable punctate focus of calcification in the lower pole of the left kidney  No hydronephrosis  Large low-attenuation area within the spleen measuring 3 5 x 3 9 cm measures smaller compared to the CT of 11/21/2022 and is not   metabolically active on PET; this area is not well characterized due to streak artifact in this region  PELVIS:   No FDG avid soft tissue lesions are seen  CT images: Surgical clips left lower lumbar spine  Degenerative changes of the imaged spine  Scattered small benign bone islands in the pelvis and proximal right femur  OSSEOUS STRUCTURES:  No FDG avid lesions are seen  CT images: No significant findings  Reference values:    Mediastinal blood pool: SUV max 2 8  Liver: SUV max 4 3  Impression: Retroperitoneal, iliac chain, and inguinal lymphadenopathy as described  However, the lymph nodes are not significantly metabolically active on PET,  with uptake less than the mediastinal blood pool (Deauville score 2)  Large low-attenuation area within the spleen measuring 3 5 x 3 9 cm measures smaller compared to the CT of 11/21/2022 and is not metabolically active on PET; this area is not well characterized due to streak artifact in this region  Recommend follow-up   contrast-enhanced CT in 3-6 months for reassessment  Workstation performed: [id number]        LABS:  Lab data are reviewed and documented in HemOnc history  No results found for this or any previous visit (from the past 48 hour(s))            Naomi Cummings  3/2/2023, 2:44 PM

## 2023-03-02 ENCOUNTER — OFFICE VISIT (OUTPATIENT)
Dept: HEMATOLOGY ONCOLOGY | Facility: CLINIC | Age: 68
End: 2023-03-02

## 2023-03-02 ENCOUNTER — TELEPHONE (OUTPATIENT)
Dept: HEMATOLOGY ONCOLOGY | Facility: CLINIC | Age: 68
End: 2023-03-02

## 2023-03-02 VITALS
BODY MASS INDEX: 39.04 KG/M2 | RESPIRATION RATE: 18 BRPM | SYSTOLIC BLOOD PRESSURE: 142 MMHG | HEIGHT: 75 IN | TEMPERATURE: 98 F | HEART RATE: 78 BPM | DIASTOLIC BLOOD PRESSURE: 70 MMHG | WEIGHT: 314 LBS | OXYGEN SATURATION: 98 %

## 2023-03-02 DIAGNOSIS — C82.08 FOLLICULAR LYMPHOMA GRADE I OF LYMPH NODES OF MULTIPLE SITES (HCC): Primary | ICD-10-CM

## 2023-03-02 NOTE — TELEPHONE ENCOUNTER
Received "Hi, this is Rahul Lucas  I have a appointment with Doctor Lexy Tamez today at [id number] and we're going to be reviewing the PET scan I had  Also, do I need blood work done for this appointment? I don't recall  I was wondering if you can call me back at [phone] again  It's Lanie leong birth date [id number]  I have an appointment doctor Lexy Tamez today and I just wondered if I need blood work done for that appointment  If I do, I can go get that done now  Thank you  I appreciate it  [phone] is my phone number  Take care  Bye bye  "    Reached out to patient and reviewed with Asha Lazo PA-C that patient does not need labs for appointment with Dr Glenda Eldridge on 3/2/23  Reviewed main focus is reviewing PET scan

## 2023-03-07 ENCOUNTER — TELEPHONE (OUTPATIENT)
Dept: NEPHROLOGY | Facility: CLINIC | Age: 68
End: 2023-03-07

## 2023-03-08 ENCOUNTER — ANESTHESIA EVENT (OUTPATIENT)
Dept: PERIOP | Facility: AMBULARY SURGERY CENTER | Age: 68
End: 2023-03-08

## 2023-03-08 ENCOUNTER — OFFICE VISIT (OUTPATIENT)
Dept: NEPHROLOGY | Facility: CLINIC | Age: 68
End: 2023-03-08

## 2023-03-08 VITALS
HEART RATE: 88 BPM | OXYGEN SATURATION: 97 % | DIASTOLIC BLOOD PRESSURE: 84 MMHG | BODY MASS INDEX: 38.54 KG/M2 | WEIGHT: 310 LBS | SYSTOLIC BLOOD PRESSURE: 138 MMHG | RESPIRATION RATE: 16 BRPM | TEMPERATURE: 97.4 F | HEIGHT: 75 IN

## 2023-03-08 DIAGNOSIS — N18.2 HYPERTENSIVE KIDNEY DISEASE WITH STAGE 2 CHRONIC KIDNEY DISEASE: ICD-10-CM

## 2023-03-08 DIAGNOSIS — R80.8 OTHER PROTEINURIA: ICD-10-CM

## 2023-03-08 DIAGNOSIS — E55.9 VITAMIN D DEFICIENCY: ICD-10-CM

## 2023-03-08 DIAGNOSIS — N18.2 STAGE 2 CHRONIC KIDNEY DISEASE: Primary | ICD-10-CM

## 2023-03-08 DIAGNOSIS — I12.9 HYPERTENSIVE KIDNEY DISEASE WITH STAGE 2 CHRONIC KIDNEY DISEASE: ICD-10-CM

## 2023-03-08 PROBLEM — N18.31 STAGE 3A CHRONIC KIDNEY DISEASE (HCC): Status: ACTIVE | Noted: 2023-03-08

## 2023-03-08 RX ORDER — ALLOPURINOL 300 MG/1
300 TABLET ORAL DAILY
COMMUNITY
Start: 2022-12-05 | End: 2023-03-08

## 2023-03-08 RX ORDER — AMOXICILLIN AND CLAVULANATE POTASSIUM 875; 125 MG/1; MG/1
1 TABLET, FILM COATED ORAL 2 TIMES DAILY
COMMUNITY
Start: 2022-12-12 | End: 2023-03-08

## 2023-03-08 NOTE — PRE-PROCEDURE INSTRUCTIONS
Pre-Surgery Instructions:   Medication Instructions   • allopurinol (ZYLOPRIM) 100 mg tablet Hold day of surgery  • amLODIPine (NORVASC) 10 mg tablet Hold day of surgery  Unless arrival time after regularly scheduled time   • aspirin 81 mg chewable tablet Stop taking 7 days prior to surgery  last dose 3/8/23   • hydrochlorothiazide (HYDRODIURIL) 25 mg tablet Hold day of surgery  • losartan (COZAAR) 100 MG tablet Hold day of surgery  • metFORMIN (GLUCOPHAGE) 500 mg tablet Hold day of surgery  • metoprolol succinate (TOPROL-XL) 100 mg 24 hr tablet Take night before surgery   • Multiple Vitamins-Minerals (Centrum Silver 50+Men) TABS Stop taking 7 days prior to surgery  last dose 3/8/23   • simvastatin (ZOCOR) 40 mg tablet Take night before surgery      Medication instructions for day surgery reviewed  Please use only a sip of water to take your instructed medications  Avoid all over the counter vitamins, supplements and NSAIDS for one week prior to surgery per anesthesia guidelines  Tylenol is ok to take as needed  You will receive a call one business day prior to surgery with an arrival time and hospital directions  If your surgery is scheduled on a Monday, the hospital will be calling you on the Friday prior to your surgery  If you have not heard from anyone by 8pm, please call the hospital supervisor through the hospital  at [phone]  Shashi Greenberg [phone])  Do not eat or drink anything after midnight the night before your surgery, including candy, mints, lifesavers, or chewing gum  Do not drink alcohol 24hrs before your surgery  Try not to smoke at least 24hrs before your surgery  Follow the pre surgery showering instructions as listed in the [facility] Surgical Experience Booklet” or otherwise provided by your surgeon's office  Do not shave the surgical area 24 hours before surgery   Do not apply any lotions, creams, including makeup, cologne, deodorant, or perfumes after showering on the day of your surgery  No contact lenses, eye make-up, or artificial eyelashes  Remove nail polish, including gel polish, and any artificial, gel, or acrylic nails if possible  Remove all jewelry including rings and body piercing jewelry  Wear causal clothing that is easy to take on and off  Consider your type of surgery  Keep any valuables, jewelry, piercings at home  Please bring any specially ordered equipment (sling, braces) if indicated  Arrange for a responsible person to drive you to and from the hospital on the day of your surgery  Visitor Guidelines discussed  Call the surgeon's office with any new illnesses, exposures, or additional questions prior to surgery  Please reference your [facility] Surgical Experience Booklet” for additional information to prepare for your upcoming surgery

## 2023-03-08 NOTE — PROGRESS NOTES
NEPHROLOGY OFFICE FOLLOW UP  Ninfa Gaitan 67 y o male  MRN: [id number]    Encounter: [id number] DATE: 3/8/2023    REASON FOR VISIT: Ninfa Gaitan is a 78 y o  male who is here on 3/8/2023 for further management of chronic kidney disease  HPI:    This is 59-year-old male with past medical history significant for hypertension, diabetes type 2, hyperlipidemia, returns to the Nephrology office for further management of CKD stage 3  Upon review of old medical records, previously known baseline creatinine was thought to be around 1 2-1 1 but in the interim renal function seems to have improved and new baseline creatinine is fluctuating around 1 0-1 1    Patient was earlier found to have mesenteric and retroperitoneal lymphadenopathy  Patient had underwent lymph node biopsy in the pas and was diagnosed with follicular lymphoma  Patient is currently being followed by oncologist and in the interim has received chemotherapy  Patient has received the last round of chemotherapy on 2/2/2023 and had underwent PET scan on 2/20/2023 and showed no activity in the lymph node area and currently chemotherapy is on hold  Previously patient had developed FINA and losartan was discontinued during the hospital stay  Patient was found to have significant proteinuria and I have restarted losartan earlier  Patient has underlying hypertension and according to patient blood pressures under well control with current antihypertensive medications    Patient has diabetes type 2 and currently taking metformin and according to patient diabetes is currently under well control  Patient takes all the medications as prescribed and denies use of NSAIDs   REVIEW OF SYSTEMS:    Review of Systems   Constitutional: Negative for chills and fever  HENT: Negative for nosebleeds and sore throat  Eyes: Negative for photophobia and pain  Respiratory: Negative for choking and wheezing      Cardiovascular: Negative for chest pain and palpitations  Gastrointestinal: Negative for blood in stool  Genitourinary: Negative for hematuria  Musculoskeletal: Negative for neck stiffness  Skin: Negative for pallor  Neurological: Negative for seizures and syncope  Psychiatric/Behavioral: Negative for confusion and suicidal ideas           PAST MEDICAL HISTORY:  Past Medical History:   Diagnosis Date   • Chronic kidney disease    • Diabetes mellitus ([facility] [address] )    • [facility]    • High cholesterol    • Hypertension        PAST SURGICAL HISTORY:  Past Surgical History:   Procedure Laterality Date   • BUNIONECTOMY Right 11/24/2020    Procedure: RIGHT HAV CORRECTION,;  Surgeon: Conchita Clarke DPM;  [facility];  Service: Podiatry   • COLONOSCOPY     • INCISION AND DRAINAGE OF WOUND Right 10/05/2016    Procedure: INCISION AND DRAINAGE (I&D) EXTREMITY;  Surgeon: Conchita Clarke DPM;  [facility];  Service:    • IR BIOPSY LYMPH NODE  07/08/2021   • IR EMBOLIZATION (SPECIFY VESSEL OR SITE)  07/08/2021   • IR PORT PLACEMENT  9/1/2022   • PILONIDAL CYST EXCISION     • WY CORRECTION HAMMERTOE Right 02/19/2019    Procedure: THIRD HAMMER TOE CORRECTION;  Surgeon: Conchita Clarke DPM;  [facility];  Service: Podiatry   • TOE OSTEOTOMY Right 03/14/2017    Procedure: HAMMERTOE CORRECTION R 2 ;  Surgeon: Conchita Clarke DPM;  [facility];  Service:    • TOE OSTEOTOMY Left 11/24/2020    Procedure: LEFT HT CORRECTION TOE;  Surgeon: Conchita Clarke DPM;  [facility];  Service: Podiatry   • TONSILLECTOMY  1963       SOCIAL HISTORY:  Social History     Substance and Sexual Activity   Alcohol Use No     Social History     Substance and Sexual Activity   Drug Use No     Social History     Tobacco Use   Smoking Status Never   Smokeless Tobacco Never   Tobacco Comments    Never smoked but exposed to second hand smoke from birth until 19's       FAMILY HISTORY:  Family History   Problem Relation Age of Onset   • Cancer Father Leukemia       ALLERGY:  Allergies   Allergen Reactions   • Lisinopril Cough       MEDICATIONS:    Current Outpatient Medications:   •  allopurinol (ZYLOPRIM) 100 mg tablet, Take 3 tablets (300 mg total) by mouth daily (Patient taking differently: Take 100 mg by mouth daily), Disp: 30 tablet, Rfl: 1  •  amLODIPine (NORVASC) 10 mg tablet, take 1 tablet by mouth once daily AT NOON, Disp: , Rfl:   •  aspirin 81 mg chewable tablet, Chew 81 mg daily, Disp: , Rfl:   •  Cholecalciferol 50 MCG (2000 UT) TABS, Take 1 tablet (2,000 Units total) by mouth daily, Disp: , Rfl:   •  hydrochlorothiazide (HYDRODIURIL) 25 mg tablet, Take 25 mg by mouth daily, Disp: , Rfl:   •  losartan (COZAAR) 100 MG tablet, Take 0 5 tablets (50 mg total) by mouth daily (Patient taking differently: Take 100 mg by mouth daily), Disp: , Rfl:   •  metFORMIN (GLUCOPHAGE) 500 mg tablet, Take 1,000 mg by mouth 2 (two) times a day with meals, Disp: , Rfl:   •  metoprolol succinate (TOPROL-XL) 100 mg 24 hr tablet, Take 100 mg by mouth every evening, Disp: , Rfl:   •  Multiple Vitamins-Minerals (Centrum Silver 50+Men) TABS, , Disp: , Rfl:   •  simvastatin (ZOCOR) 40 mg tablet, Take 40 mg by mouth daily at bedtime, Disp: , Rfl:     PHYSICAL EXAM:  Vitals:    03/08/23 1515   BP: 138/84   BP Location: Left arm   Patient Position: Sitting   Cuff Size: Large   Pulse: 88   Resp: 16   Temp: (!) 97 4 °F (36 3 °C)   TempSrc: Temporal   SpO2: 97%   Weight: (!) 141 kg (310 lb)   Height: 6' 3" (1 905 m)     Body mass index is 38 75 kg/m²  Physical Exam  Constitutional:       General: He is not in acute distress  HENT:      Right Ear: External ear normal    Eyes:      General: No scleral icterus  Conjunctiva/sclera:      Right eye: No hemorrhage  Neck:      Thyroid: No thyromegaly  Vascular: No JVD  Cardiovascular:      Rate and Rhythm: Normal rate  Heart sounds: Murmur heard     Pulmonary:      Effort: Pulmonary effort is normal  No accessory muscle usage or respiratory distress  Breath sounds: No wheezing  Abdominal:      General: There is no distension  Musculoskeletal:      Right ankle: No swelling  Left ankle: No swelling  Skin:     General: Skin is warm  Coloration: Skin is not jaundiced  Neurological:      Mental Status: He is alert  He is not disoriented  Psychiatric:         Speech: He is communicative  Behavior: Behavior is not combative  LAB RESULTS:  Results for orders placed or performed during the hospital encounter of 02/20/23   Fingerstick Glucose (POCT)   Result Value Ref Range    POC Glucose 124 65 - 140 mg/dl       ASSESSMENT and PLAN:  Flor Rangel was seen today for chronic kidney disease and follow-up  Diagnoses and all orders for this visit:    Stage 2 chronic kidney disease  -     CBC and differential; Future  -     Basic metabolic panel; Future  -     Urinalysis with microscopic; Future  -     Microalbumin / creatinine urine ratio; Future    Hypertensive kidney disease with stage 2 chronic kidney disease  -     Basic metabolic panel; Future  -     Urinalysis with microscopic; Future  -     Microalbumin / creatinine urine ratio; Future    Other proteinuria  -     Basic metabolic panel; Future  -     Urinalysis with microscopic; Future  -     Microalbumin / creatinine urine ratio; Future    Vitamin D deficiency  -     Vitamin D 25 hydroxy; Future  -     Cholecalciferol 50 MCG (2000 UT) TABS; Take 1 tablet (2,000 Units total) by mouth daily      1  CKD stage 2-3  Multifactorial, suspected due to underlying diabetic nephropathy on top of hypertensive nephrosclerosis with proteinuria  Upon review of old medical records from [facility] chart review, previously known baseline creatinine was thought to be around 1 2-1 4  Earlier patient was diagnosed with follicular lymphoma and had underwent chemotherapy in the interim    Renal function seems to have improved since that time and baseline creatinine now seems to be fluctuating around 1 0-1 1  Current creatinine is 1 08 with a EGFR of 71    Clinically not in volume overload state and advised patient to drink around 2 L of fluid on daily basis versus staying well-hydrated  Management of diabetes and hypertension as mentioned below  Plan to avoid NSAIDs and recheck renal function before next visit  2   Hypertension in chronic kidney disease   Today's blood pressures under well control and plan to monitor hypertension with amlodipine 10 mg p o  daily, losartan 100 mg p o  daily, hydrochlorothiazide 25 mg p o  daily and metoprolol  mg p o  daily today  Encourage patient to follow low-salt diet  3  Diabetes type 2 in chronic kidney disease  Goal Hb A1c would be < 7% for underlying chronic kidney disease  Currently patient is taking metformin  Based on current GFR, okay to take metformin from renal standpoint  Defer further management diabetes to PCP  4  Proteinuria  Multifactorial and suspected due to underlying diabetic nephropathy on top of hypertensive nephrosclerosis  SPEP and UPEP done on 1/31/2023 showed no M spike  Current urine microalbumin to creatinine ratio is 1 1 to 3 g which is on the higher side but better than before  For time being plan to continue losartan 100 mg p o  daily and recheck proteinuria with the next visit  May consider SGLT2 receptor inhibitor in the future if proteinuria remains significant in the future  5   Vitamin D deficiency  Patient was found to have low vitamin D level of 16 with elevated PTH level of 94  Both are not at goal   Plan to start patient on cholecalciferol 2008 p o  daily and recheck vitamin D level with the next visit  Returns to Nephrology office in 6 months   Future labs ordered  Portions of the record may have been created with voice recognition software   Occasional wrong word or "sound a like" substitutions may have occurred due to the inherent limitations of voice recognition software  Read the chart carefully and recognize, using context, where substitutions have occurred  If you have any questions, please contact the dictating provider

## 2023-03-14 ENCOUNTER — TELEPHONE (OUTPATIENT)
Dept: HEMATOLOGY ONCOLOGY | Facility: CLINIC | Age: 68
End: 2023-03-14

## 2023-03-14 ENCOUNTER — HOSPITAL ENCOUNTER (OUTPATIENT)
Facility: AMBULARY SURGERY CENTER | Age: 68
Setting detail: OUTPATIENT SURGERY
Discharge: HOME | End: 2023-03-14
Attending: RADIOLOGY | Admitting: RADIOLOGY

## 2023-03-14 ENCOUNTER — ANESTHESIA (OUTPATIENT)
Dept: PERIOP | Facility: AMBULARY SURGERY CENTER | Age: 68
End: 2023-03-14

## 2023-03-14 VITALS
DIASTOLIC BLOOD PRESSURE: 61 MMHG | RESPIRATION RATE: 20 BRPM | HEART RATE: 78 BPM | TEMPERATURE: 97 F | SYSTOLIC BLOOD PRESSURE: 117 MMHG | OXYGEN SATURATION: 98 %

## 2023-03-14 LAB — GLUCOSE SERPL-MCNC: 157 MG/DL (ref 65–140)

## 2023-03-14 RX ORDER — ONDANSETRON 2 MG/ML
4 INJECTION INTRAMUSCULAR; INTRAVENOUS ONCE AS NEEDED
Status: DISCONTINUED | OUTPATIENT
Start: 2023-03-14 | End: 2023-03-14 | Stop reason: HOSPADM

## 2023-03-14 RX ORDER — LIDOCAINE HYDROCHLORIDE 10 MG/ML
0.5 INJECTION, SOLUTION EPIDURAL; INFILTRATION; INTRACAUDAL; PERINEURAL ONCE AS NEEDED
Status: DISCONTINUED | OUTPATIENT
Start: 2023-03-14 | End: 2023-03-14 | Stop reason: HOSPADM

## 2023-03-14 RX ORDER — MIDAZOLAM HYDROCHLORIDE 2 MG/2ML
INJECTION, SOLUTION INTRAMUSCULAR; INTRAVENOUS AS NEEDED
Status: DISCONTINUED | OUTPATIENT
Start: 2023-03-14 | End: 2023-03-14

## 2023-03-14 RX ORDER — LIDOCAINE HYDROCHLORIDE AND EPINEPHRINE 10; 10 MG/ML; UG/ML
INJECTION, SOLUTION INFILTRATION; PERINEURAL AS NEEDED
Status: DISCONTINUED | OUTPATIENT
Start: 2023-03-14 | End: 2023-03-14 | Stop reason: HOSPADM

## 2023-03-14 RX ORDER — SODIUM CHLORIDE, SODIUM LACTATE, POTASSIUM CHLORIDE, CALCIUM CHLORIDE 600; 310; 30; 20 MG/100ML; MG/100ML; MG/100ML; MG/100ML
125 INJECTION, SOLUTION INTRAVENOUS CONTINUOUS
Status: DISCONTINUED | OUTPATIENT
Start: 2023-03-14 | End: 2023-03-14 | Stop reason: HOSPADM

## 2023-03-14 RX ORDER — FENTANYL CITRATE 50 UG/ML
INJECTION, SOLUTION INTRAMUSCULAR; INTRAVENOUS AS NEEDED
Status: DISCONTINUED | OUTPATIENT
Start: 2023-03-14 | End: 2023-03-14

## 2023-03-14 RX ORDER — FENTANYL CITRATE/PF 50 MCG/ML
25 SYRINGE (ML) INJECTION
Status: DISCONTINUED | OUTPATIENT
Start: 2023-03-14 | End: 2023-03-14 | Stop reason: HOSPADM

## 2023-03-14 RX ORDER — SODIUM CHLORIDE 9 MG/ML
INJECTION, SOLUTION INTRAVENOUS AS NEEDED
Status: DISCONTINUED | OUTPATIENT
Start: 2023-03-14 | End: 2023-03-14 | Stop reason: HOSPADM

## 2023-03-14 RX ORDER — SODIUM CHLORIDE, SODIUM LACTATE, POTASSIUM CHLORIDE, CALCIUM CHLORIDE 600; 310; 30; 20 MG/100ML; MG/100ML; MG/100ML; MG/100ML
INJECTION, SOLUTION INTRAVENOUS CONTINUOUS PRN
Status: DISCONTINUED | OUTPATIENT
Start: 2023-03-14 | End: 2023-03-14

## 2023-03-14 RX ORDER — ONDANSETRON 2 MG/ML
INJECTION INTRAMUSCULAR; INTRAVENOUS AS NEEDED
Status: DISCONTINUED | OUTPATIENT
Start: 2023-03-14 | End: 2023-03-14

## 2023-03-14 RX ADMIN — FENTANYL CITRATE 25 MCG: 50 INJECTION INTRAMUSCULAR; INTRAVENOUS at 10:22

## 2023-03-14 RX ADMIN — MIDAZOLAM 2 MG: 1 INJECTION INTRAMUSCULAR; INTRAVENOUS at 10:18

## 2023-03-14 RX ADMIN — FENTANYL CITRATE 25 MCG: 50 INJECTION INTRAMUSCULAR; INTRAVENOUS at 10:27

## 2023-03-14 RX ADMIN — SODIUM CHLORIDE, SODIUM LACTATE, POTASSIUM CHLORIDE, AND CALCIUM CHLORIDE: .6; .31; .03; .02 INJECTION, SOLUTION INTRAVENOUS at 10:12

## 2023-03-14 RX ADMIN — ONDANSETRON 4 MG: 2 INJECTION INTRAMUSCULAR; INTRAVENOUS at 10:22

## 2023-03-14 NOTE — TELEPHONE ENCOUNTER
Jed [facility], this is Karolina Sever  Birth date [birth year]  Excuse me just to let you know, I had my port removed today, which is a great thing, but I see in my chart I still have an appointment that should have been cancelled for a port flushing  Believe it's this coming Friday  And I wanted to give somebody some heads up  I can't cancel it through my, through the, through my chart  But it was supposed to have been cancelled and I don't want them waiting for me  And of course I'm not going to be there  So I'd appreciate this  You can either take care of it or let me know how I can take care of it  I would appreciate it  [phone] if you need to get ahold of me  Thanks, Peyton ge  Infusion  notified  Returned telephone call out to patient, reviewed message sent to infusion   Left RN teams number for additional questions and concerns

## 2023-03-14 NOTE — INTERVAL H&P NOTE
Patient arrived to [facility] for port removal    The procedure and risks were discussed with the patient  All questions were answered  Informed consent was obtained  H & P reviewed after examining the patient and I find no changes in the patient condition since the H & P has been written  /88   Pulse 85   Temp 97 8 °F (36 6 °C) (Temporal)   Resp 18   SpO2 95%     Patient re-evaluated   Accept as history and physical     Eladio Negrete, DO/March 14, 2023/9:22 AM

## 2023-03-14 NOTE — OP NOTE
Right chest port removal 3/14/23        History-  Lymphoma        Contrast- None     Fluoroscopy time: None         Procedure- The patient was identified verbally and by wristband  Timeout was performed  Informed consent was obtained  Following obtaining informed consent, the patient was prepped and   draped in the usual sterile fashion  The region of the right anterior   chest port was anesthetized using 1% lidocaine  A incision was made   over the previously placed chest port with a 15 blade scalpel  Using   blunt and sharp dissection the port was removed in its entirety  The   port pocket was flushed with copious saline  The incision was   approximated using 3-0 Vicryl and tissue adhesive  The patient tolerated   the procedure well without apparent immediate complication  The patient   left the IR department in unchanged condition  Dr Eric Jung performed and   directly supervised the entire procedure  Findings-      The skin overlying the chest wall is nonerythematous  There is no   evidence of purulent exudate  The port was removed in its entirety   without complication  Impression-       Successful removal of right chest port

## 2023-03-14 NOTE — ANESTHESIA POSTPROCEDURE EVALUATION
Post-Op Assessment Note    CV Status:  Stable  Pain Score: 0    Pain management: adequate     Mental Status:  Alert and awake   Hydration Status:  Euvolemic   PONV Controlled:  Controlled   Airway Patency:  Patent      Post Op Vitals Reviewed: Yes      Staff: CRNA         No notable events documented      /67   Temp   98 6   Pulse  78   Resp   18   SpO2   93%

## 2023-03-14 NOTE — DISCHARGE INSTRUCTIONS
Implanted Venous Access Port Removal    WHAT YOU NEED TO KNOW:   An implanted venous access port is a device used to give treatments and take blood  It may also be called a central venous access device (CVAD)  The port is a small container that is placed under your skin, usually in your upper chest  A port can also be placed in your arm or abdomen  The port is attached to a catheter that enters a large vein  DISCHARGE INSTRUCTIONS:   Resume your normal diet  Small sips of flat soda will help with mild nausea  Prevent an infection:     Wash your hands often  Use soap and water  Clean your hands before and  after you care for your incision  Check your skin for infection every day  Look for redness, swelling, or fluid oozing from the incision site  Dressing may come off in 24 hours  Medical glue will peel off on its own in 5 to 10 days  You may shower 24 hours after procedure  Follow up with your healthcare provider as directed  Write down your questions so you remember to ask them during your visits  Activity:  You may return to your daily activities when the area heals  Contact Interventional Radiology at [phone] Day[place] PATIENTS: Contact Interventional Radiology at [phone]) Lana Smith PATIENTS: Contact Interventional Radiology at [phone]) if:     You have a fever  You have persistent nausea  Your inciscion site is red, swollen, or draining pus  You have questions or concerns about your condition or care  Seek care immediately or call 911 if:  Blood soaks through your bandage  The skin over or around your incision breaks open  Your heart is jumping or fluttering  You have a headache, blurred vision, and feel confused  You have pain in your arm, neck, shoulder, or chest     You have trouble breathing that is getting worse over time

## 2023-03-14 NOTE — ANESTHESIA PREPROCEDURE EVALUATION
MD paged re: temp 100.1. No new orders at this time. Fluids and IS use encouraged. Will monitor.   Procedure:  REMOVAL VENOUS PORT (PORT-A-CATH)IR (Chest)    Relevant Problems   CARDIO   (+) Heart murmur   (+) Hypercholesteremia   (+) Hypertension      ENDO   (+) Diabetes 1 5, managed as type 2 (HCC)   (+) Type 2 diabetes mellitus (HCC)      GI/HEPATIC   (+) GI bleed      /RENAL   (+) FINA (acute kidney injury) ([address] )   (+) Hypertensive kidney disease with stage 2 chronic kidney disease   (+) Stage 2 chronic kidney disease   (+) Stage 3a chronic kidney disease (HCC)      HEMATOLOGY   (+) Acute blood loss anemia   (+) Follicular lymphoma grade I of lymph nodes of multiple sites (HCC)      MUSCULOSKELETAL   (+) Gout      NEURO/PSYCH   (+) Diabetic peripheral neuropathy (HCC)      PULMONARY   (+) Pleural effusion      Other   (+) Mesenteric lymphadenopathy   (+) Obesity, morbid (HCC)       Physical Exam    Airway    Mallampati score: II  TM Distance: >3 FB  Neck ROM: full     Dental   No notable dental hx     Cardiovascular      Pulmonary      Other Findings        Anesthesia Plan  ASA Score- 3     Anesthesia Type- IV sedation with anesthesia with ASA Monitors  Additional Monitors:   Airway Plan:           Plan Factors-Exercise tolerance (METS): >4 METS  Chart reviewed  Patient is not a current smoker  Obstructive sleep apnea risk education given perioperatively  Induction- intravenous  Postoperative Plan-     Informed Consent- Anesthetic plan and risks discussed with patient  I personally reviewed this patient with the CRNA  Discussed and agreed on the Anesthesia Plan with the CRNA  Joan Lozada

## 2023-05-09 ENCOUNTER — TELEPHONE (OUTPATIENT)
Dept: HEMATOLOGY ONCOLOGY | Facility: CLINIC | Age: 68
End: 2023-05-09

## 2023-05-18 ENCOUNTER — APPOINTMENT (OUTPATIENT)
Dept: LAB | Facility: HOSPITAL | Age: 68
End: 2023-05-18
Payer: MEDICARE

## 2023-05-18 DIAGNOSIS — C85.90 LYMPHOMA, UNSPECIFIED BODY REGION, UNSPECIFIED LYMPHOMA TYPE (HCC): ICD-10-CM

## 2023-05-18 DIAGNOSIS — E78.5 HYPERLIPIDEMIA, UNSPECIFIED HYPERLIPIDEMIA TYPE: ICD-10-CM

## 2023-05-18 DIAGNOSIS — R77.8 HIGH SERUM HAPTOGLOBIN: ICD-10-CM

## 2023-05-18 DIAGNOSIS — E11.37X9 CONTROLLED TYPE 2 DIABETES MELLITUS WITH DIABETIC MACULAR EDEMA RESOLVED AFTER TREATMENT, WITHOUT LONG-TERM CURRENT USE OF INSULIN, UNSPECIFIED LATERALITY (HCC): ICD-10-CM

## 2023-05-18 DIAGNOSIS — R59.0 LYMPHADENOPATHY, ABDOMINAL: ICD-10-CM

## 2023-05-18 DIAGNOSIS — Z12.5 SPECIAL SCREENING FOR MALIGNANT NEOPLASM OF PROSTATE: ICD-10-CM

## 2023-05-18 DIAGNOSIS — M10.9 GOUT, UNSPECIFIED CAUSE, UNSPECIFIED CHRONICITY, UNSPECIFIED SITE: ICD-10-CM

## 2023-05-18 DIAGNOSIS — I10 HYPERTENSION, ESSENTIAL: ICD-10-CM

## 2023-05-18 LAB
ALBUMIN SERPL BCP-MCNC: 4.6 G/DL (ref 3.5–5)
ALP SERPL-CCNC: 56 U/L (ref 34–104)
ALT SERPL W P-5'-P-CCNC: 35 U/L (ref 7–52)
ANION GAP SERPL CALCULATED.3IONS-SCNC: 8 MMOL/L (ref 4–13)
AST SERPL W P-5'-P-CCNC: 25 U/L (ref 13–39)
BASOPHILS # BLD AUTO: 0.07 THOUSANDS/ÂΜL (ref 0–0.1)
BASOPHILS NFR BLD AUTO: 1 % (ref 0–1)
BILIRUB SERPL-MCNC: 0.34 MG/DL (ref 0.2–1)
BUN SERPL-MCNC: 25 MG/DL (ref 5–25)
CALCIUM SERPL-MCNC: 10.8 MG/DL (ref 8.4–10.2)
CHLORIDE SERPL-SCNC: 99 MMOL/L (ref 96–108)
CHOLEST SERPL-MCNC: 166 MG/DL
CO2 SERPL-SCNC: 31 MMOL/L (ref 21–32)
CREAT SERPL-MCNC: 1.01 MG/DL (ref 0.6–1.3)
EOSINOPHIL # BLD AUTO: 0.35 THOUSAND/ÂΜL (ref 0–0.61)
EOSINOPHIL NFR BLD AUTO: 6 % (ref 0–6)
ERYTHROCYTE [DISTWIDTH] IN BLOOD BY AUTOMATED COUNT: 14.6 % (ref 11.6–15.1)
GFR SERPL CREATININE-BSD FRML MDRD: 77 ML/MIN/1.73SQ M
GLUCOSE P FAST SERPL-MCNC: 176 MG/DL (ref 65–99)
HCT VFR BLD AUTO: 40.3 % (ref 36.5–49.3)
HDLC SERPL-MCNC: 36 MG/DL
HGB BLD-MCNC: 12.9 G/DL (ref 12–17)
IMM GRANULOCYTES # BLD AUTO: 0.05 THOUSAND/UL (ref 0–0.2)
IMM GRANULOCYTES NFR BLD AUTO: 1 % (ref 0–2)
LDLC SERPL CALC-MCNC: 53 MG/DL (ref 0–100)
LYMPHOCYTES # BLD AUTO: 0.42 THOUSANDS/ÂΜL (ref 0.6–4.47)
LYMPHOCYTES NFR BLD AUTO: 7 % (ref 14–44)
MCH RBC QN AUTO: 28.2 PG (ref 26.8–34.3)
MCHC RBC AUTO-ENTMCNC: 32 G/DL (ref 31.4–37.4)
MCV RBC AUTO: 88 FL (ref 82–98)
MONOCYTES # BLD AUTO: 0.65 THOUSAND/ÂΜL (ref 0.17–1.22)
MONOCYTES NFR BLD AUTO: 10 % (ref 4–12)
NEUTROPHILS # BLD AUTO: 4.76 THOUSANDS/ÂΜL (ref 1.85–7.62)
NEUTS SEG NFR BLD AUTO: 75 % (ref 43–75)
NONHDLC SERPL-MCNC: 130 MG/DL
NRBC BLD AUTO-RTO: 0 /100 WBCS
PLATELET # BLD AUTO: 173 THOUSANDS/UL (ref 149–390)
PMV BLD AUTO: 10.2 FL (ref 8.9–12.7)
POTASSIUM SERPL-SCNC: 4.7 MMOL/L (ref 3.5–5.3)
PROT SERPL-MCNC: 7.6 G/DL (ref 6.4–8.4)
PSA SERPL-MCNC: 0.65 NG/ML (ref 0–4)
RBC # BLD AUTO: 4.58 MILLION/UL (ref 3.88–5.62)
SODIUM SERPL-SCNC: 138 MMOL/L (ref 135–147)
TRIGL SERPL-MCNC: 384 MG/DL
URATE SERPL-MCNC: 9 MG/DL (ref 3.5–8.5)
WBC # BLD AUTO: 6.3 THOUSAND/UL (ref 4.31–10.16)

## 2023-05-18 PROCEDURE — 36415 COLL VENOUS BLD VENIPUNCTURE: CPT

## 2023-05-18 PROCEDURE — 83036 HEMOGLOBIN GLYCOSYLATED A1C: CPT

## 2023-05-18 PROCEDURE — 80053 COMPREHEN METABOLIC PANEL: CPT

## 2023-05-18 PROCEDURE — G0103 PSA SCREENING: HCPCS

## 2023-05-18 PROCEDURE — 85025 COMPLETE CBC W/AUTO DIFF WBC: CPT

## 2023-05-18 PROCEDURE — 84550 ASSAY OF BLOOD/URIC ACID: CPT

## 2023-05-18 PROCEDURE — 80061 LIPID PANEL: CPT

## 2023-05-19 LAB
EST. AVERAGE GLUCOSE BLD GHB EST-MCNC: 146 MG/DL
HBA1C MFR BLD: 6.7 %

## 2023-06-07 ENCOUNTER — HOSPITAL ENCOUNTER (OUTPATIENT)
Dept: NON INVASIVE DIAGNOSTICS | Facility: CLINIC | Age: 68
Discharge: HOME | End: 2023-06-07
Payer: MEDICARE

## 2023-06-07 VITALS
HEIGHT: 75 IN | SYSTOLIC BLOOD PRESSURE: 117 MMHG | BODY MASS INDEX: 38.54 KG/M2 | DIASTOLIC BLOOD PRESSURE: 61 MMHG | WEIGHT: 310 LBS | HEART RATE: 78 BPM

## 2023-06-07 DIAGNOSIS — I35.8 OTHER NONRHEUMATIC AORTIC VALVE DISORDERS: ICD-10-CM

## 2023-06-07 LAB
AORTIC ROOT: 4.1 CM
AORTIC VALVE MEAN VELOCITY: 17.8 M/S
APICAL FOUR CHAMBER EJECTION FRACTION: 64 %
ASCENDING AORTA: 4.5 CM
AV AREA BY CONTINUOUS VTI: 1.8 CM2
AV AREA PEAK VELOCITY: 1.6 CM2
AV LVOT MEAN GRADIENT: 2 MMHG
AV LVOT PEAK GRADIENT: 4 MMHG
AV MEAN GRADIENT: 15 MMHG
AV PEAK GRADIENT: 30 MMHG
AV VALVE AREA: 1.76 CM2
AV VELOCITY RATIO: 0.37
DOP CALC AO PEAK VEL: 2.73 M/S
DOP CALC AO VTI: 55.97 CM
DOP CALC LVOT AREA: 4.15 CM2
DOP CALC LVOT DIAMETER: 2.3 CM
DOP CALC LVOT PEAK VEL VTI: 23.77 CM
DOP CALC LVOT PEAK VEL: 1.02 M/S
DOP CALC LVOT STROKE INDEX: 38.3 ML/M2
DOP CALC LVOT STROKE VOLUME: 98.71 CM3
DOP CALC MV VTI: 28.17 CM
E WAVE DECELERATION TIME: 221 MS
FRACTIONAL SHORTENING: 43 % (ref 28–44)
INTERVENTRICULAR SEPTUM IN DIASTOLE (PARASTERNAL SHORT AXIS VIEW): 1.2 CM
INTERVENTRICULAR SEPTUM: 1.2 CM (ref 0.6–1.1)
LAAS-AP2: 25.2 CM2
LAAS-AP4: 21.5 CM2
LEFT ATRIUM SIZE: 4.1 CM
LEFT INTERNAL DIMENSION IN SYSTOLE: 2.7 CM (ref 2.1–4)
LEFT VENTRICULAR INTERNAL DIMENSION IN DIASTOLE: 4.7 CM (ref 3.5–6)
LEFT VENTRICULAR POSTERIOR WALL IN END DIASTOLE: 1.3 CM
LEFT VENTRICULAR STROKE VOLUME: 78 ML
LVSV (TEICH): 78 ML
MV E'TISSUE VEL-SEP: 6 CM/S
MV MEAN GRADIENT: 2 MMHG
MV PEAK A VEL: 1.18 M/S
MV PEAK E VEL: 75 CM/S
MV PEAK GRADIENT: 7 MMHG
MV STENOSIS PRESSURE HALF TIME: 64 MS
MV VALVE AREA BY CONTINUITY EQUATION: 3.5 CM2
MV VALVE AREA P 1/2 METHOD: 3.44 CM2
RIGHT ATRIUM AREA SYSTOLE A4C: 18.9 CM2
RIGHT VENTRICLE ID DIMENSION: 3.9 CM
SL CV LEFT ATRIUM LENGTH A2C: 5.6 CM
SL CV LV EF: 60
SL CV PED ECHO LEFT VENTRICLE DIASTOLIC VOLUME (MOD BIPLANE) 2D: 105 ML
SL CV PED ECHO LEFT VENTRICLE SYSTOLIC VOLUME (MOD BIPLANE) 2D: 27 ML
TRICUSPID ANNULAR PLANE SYSTOLIC EXCURSION: 1.9 CM

## 2023-06-07 PROCEDURE — 93306 TTE W/DOPPLER COMPLETE: CPT

## 2023-06-07 PROCEDURE — 93306 TTE W/DOPPLER COMPLETE: CPT | Performed by: INTERNAL MEDICINE

## 2023-07-28 ENCOUNTER — TELEPHONE (OUTPATIENT)
Dept: HEMATOLOGY ONCOLOGY | Facility: CLINIC | Age: 68
End: 2023-07-28

## 2023-07-28 NOTE — TELEPHONE ENCOUNTER
I called [facility] regarding an appointment that they have scheduled with Dr. Danie Miller scheduled on 09/13/2023     I left a voicemail explaining to patient that this appointment will need to be rescheduled due to a change in the providers schedule. Patient was advised to call [facility] to reschedule. A [employer] message (if applicable) has been sent to patient relaying the above information and advising patient to call [facility] and reschedule their appointment.

## 2023-08-02 ENCOUNTER — TELEPHONE (OUTPATIENT)
Dept: HEMATOLOGY ONCOLOGY | Facility: CLINIC | Age: 68
End: 2023-08-02

## 2023-08-02 NOTE — TELEPHONE ENCOUNTER
I called [facility] regarding an appointment that they have scheduled with Dr. Ketan Cruz scheduled on 09/13/2023      I left a voicemail explaining to patient that this appointment will need to be rescheduled due to a change in the providers schedule. Patient was advised to call [facility] to reschedule.      A [employer] message (if applicable) has been sent to patient relaying the above information and advising patient to call [facility] and reschedule their appointment.

## 2023-08-03 ENCOUNTER — TELEPHONE (OUTPATIENT)
Dept: HEMATOLOGY ONCOLOGY | Facility: CLINIC | Age: 68
End: 2023-08-03

## 2023-08-03 NOTE — TELEPHONE ENCOUNTER
We've made attempts to reach the patient to assist with rescheduling patients appointment with Dr. Chuck Barnett  on 9/13/23  but have been unsuccessful. Appointment has been cancelled, and advised to reach out to us at the [facility] at [phone] so that we may assist  in rescheduling appointment. Letter has been sent.     Patient can be IRVIN to Dr Taniya Degroot or Dr Zeny Katz

## 2023-08-07 ENCOUNTER — TELEPHONE (OUTPATIENT)
Dept: HEMATOLOGY ONCOLOGY | Facility: CLINIC | Age: 68
End: 2023-08-07

## 2023-08-07 NOTE — TELEPHONE ENCOUNTER
Appointment Schedule   Who are you speaking with? Patient   If it is not the patient, are they listed on an active communication consent form? N/A   Which provider is the appointment scheduled with? Dr. Keith Lagunas   At which location is the appointment scheduled for? [facility]   When is the appointment scheduled? Please list date and time 11/2/23 9am   What is the reason for this appointment? provider   Did patient voice understanding of the details of this appointment? Yes   Was the no show policy reviewed with patient?  Yes

## 2023-08-19 ENCOUNTER — APPOINTMENT (OUTPATIENT)
Dept: LAB | Facility: HOSPITAL | Age: 68
End: 2023-08-19
Payer: MEDICARE

## 2023-08-19 DIAGNOSIS — C82.08 FOLLICULAR LYMPHOMA GRADE I OF LYMPH NODES OF MULTIPLE SITES (HCC): ICD-10-CM

## 2023-08-19 LAB
ALBUMIN SERPL BCP-MCNC: 4.6 G/DL (ref 3.5–5)
ALP SERPL-CCNC: 53 U/L (ref 34–104)
ALT SERPL W P-5'-P-CCNC: 40 U/L (ref 7–52)
ANION GAP SERPL CALCULATED.3IONS-SCNC: 7 MMOL/L
AST SERPL W P-5'-P-CCNC: 32 U/L (ref 13–39)
BASOPHILS # BLD AUTO: 0.09 THOUSANDS/ÂΜL (ref 0–0.1)
BASOPHILS NFR BLD AUTO: 2 % (ref 0–1)
BILIRUB SERPL-MCNC: 0.33 MG/DL (ref 0.2–1)
BUN SERPL-MCNC: 33 MG/DL (ref 5–25)
CALCIUM SERPL-MCNC: 10.4 MG/DL (ref 8.4–10.2)
CHLORIDE SERPL-SCNC: 101 MMOL/L (ref 96–108)
CO2 SERPL-SCNC: 30 MMOL/L (ref 21–32)
CREAT SERPL-MCNC: 1.05 MG/DL (ref 0.6–1.3)
EOSINOPHIL # BLD AUTO: 0.28 THOUSAND/ÂΜL (ref 0–0.61)
EOSINOPHIL NFR BLD AUTO: 5 % (ref 0–6)
ERYTHROCYTE [DISTWIDTH] IN BLOOD BY AUTOMATED COUNT: 14.6 % (ref 11.6–15.1)
GFR SERPL CREATININE-BSD FRML MDRD: 73 ML/MIN/1.73SQ M
GLUCOSE P FAST SERPL-MCNC: 194 MG/DL (ref 65–99)
HCT VFR BLD AUTO: 39.1 % (ref 36.5–49.3)
HGB BLD-MCNC: 12.8 G/DL (ref 12–17)
IMM GRANULOCYTES # BLD AUTO: 0.05 THOUSAND/UL (ref 0–0.2)
IMM GRANULOCYTES NFR BLD AUTO: 1 % (ref 0–2)
LYMPHOCYTES # BLD AUTO: 0.51 THOUSANDS/ÂΜL (ref 0.6–4.47)
LYMPHOCYTES NFR BLD AUTO: 9 % (ref 14–44)
MCH RBC QN AUTO: 28.4 PG (ref 26.8–34.3)
MCHC RBC AUTO-ENTMCNC: 32.7 G/DL (ref 31.4–37.4)
MCV RBC AUTO: 87 FL (ref 82–98)
MONOCYTES # BLD AUTO: 0.69 THOUSAND/ÂΜL (ref 0.17–1.22)
MONOCYTES NFR BLD AUTO: 12 % (ref 4–12)
NEUTROPHILS # BLD AUTO: 4.24 THOUSANDS/ÂΜL (ref 1.85–7.62)
NEUTS SEG NFR BLD AUTO: 71 % (ref 43–75)
NRBC BLD AUTO-RTO: 0 /100 WBCS
PLATELET # BLD AUTO: 166 THOUSANDS/UL (ref 149–390)
PMV BLD AUTO: 9.4 FL (ref 8.9–12.7)
POTASSIUM SERPL-SCNC: 4.7 MMOL/L (ref 3.5–5.3)
PROT SERPL-MCNC: 7.6 G/DL (ref 6.4–8.4)
RBC # BLD AUTO: 4.51 MILLION/UL (ref 3.88–5.62)
SODIUM SERPL-SCNC: 138 MMOL/L (ref 135–147)
WBC # BLD AUTO: 5.86 THOUSAND/UL (ref 4.31–10.16)

## 2023-08-19 PROCEDURE — 80053 COMPREHEN METABOLIC PANEL: CPT

## 2023-08-19 PROCEDURE — 36415 COLL VENOUS BLD VENIPUNCTURE: CPT

## 2023-08-19 PROCEDURE — 85025 COMPLETE CBC W/AUTO DIFF WBC: CPT

## 2023-08-21 ENCOUNTER — HOSPITAL ENCOUNTER (OUTPATIENT)
Dept: CT IMAGING | Facility: HOSPITAL | Age: 68
Discharge: HOME | End: 2023-08-21
Attending: INTERNAL MEDICINE
Payer: MEDICARE

## 2023-08-21 DIAGNOSIS — C82.08 FOLLICULAR LYMPHOMA GRADE I OF LYMPH NODES OF MULTIPLE SITES (HCC): ICD-10-CM

## 2023-08-21 PROCEDURE — G1004 CDSM NDSC: HCPCS

## 2023-08-21 PROCEDURE — 71260 CT THORAX DX C+: CPT

## 2023-08-21 PROCEDURE — 74177 CT ABD & PELVIS W/CONTRAST: CPT

## 2023-08-21 RX ADMIN — IOHEXOL 100 ML: 350 INJECTION, SOLUTION INTRAVENOUS at 14:39

## 2023-08-28 ENCOUNTER — APPOINTMENT (OUTPATIENT)
Dept: LAB | Facility: HOSPITAL | Age: 68
End: 2023-08-28
Attending: INTERNAL MEDICINE
Payer: MEDICARE

## 2023-08-28 DIAGNOSIS — I12.9 HYPERTENSIVE KIDNEY DISEASE WITH STAGE 2 CHRONIC KIDNEY DISEASE: ICD-10-CM

## 2023-08-28 DIAGNOSIS — E55.9 VITAMIN D DEFICIENCY: ICD-10-CM

## 2023-08-28 DIAGNOSIS — N18.2 STAGE 2 CHRONIC KIDNEY DISEASE: ICD-10-CM

## 2023-08-28 DIAGNOSIS — N18.2 HYPERTENSIVE KIDNEY DISEASE WITH STAGE 2 CHRONIC KIDNEY DISEASE: ICD-10-CM

## 2023-08-28 DIAGNOSIS — R80.8 OTHER PROTEINURIA: ICD-10-CM

## 2023-08-28 LAB
25(OH)D3 SERPL-MCNC: 24.8 NG/ML (ref 30–100)
ANION GAP SERPL CALCULATED.3IONS-SCNC: 7 MMOL/L
BACTERIA UR QL AUTO: ABNORMAL /HPF
BASOPHILS # BLD AUTO: 0.1 THOUSANDS/ÂΜL (ref 0–0.1)
BASOPHILS NFR BLD AUTO: 2 % (ref 0–1)
BILIRUB UR QL STRIP: NEGATIVE
BUN SERPL-MCNC: 27 MG/DL (ref 5–25)
CALCIUM SERPL-MCNC: 10 MG/DL (ref 8.4–10.2)
CHLORIDE SERPL-SCNC: 99 MMOL/L (ref 96–108)
CLARITY UR: CLEAR
CO2 SERPL-SCNC: 31 MMOL/L (ref 21–32)
COLOR UR: ABNORMAL
CREAT SERPL-MCNC: 1.26 MG/DL (ref 0.6–1.3)
EOSINOPHIL # BLD AUTO: 0.28 THOUSAND/ÂΜL (ref 0–0.61)
EOSINOPHIL NFR BLD AUTO: 4 % (ref 0–6)
ERYTHROCYTE [DISTWIDTH] IN BLOOD BY AUTOMATED COUNT: 14.5 % (ref 11.6–15.1)
GFR SERPL CREATININE-BSD FRML MDRD: 59 ML/MIN/1.73SQ M
GLUCOSE P FAST SERPL-MCNC: 176 MG/DL (ref 65–99)
GLUCOSE UR STRIP-MCNC: NEGATIVE MG/DL
HCT VFR BLD AUTO: 38 % (ref 36.5–49.3)
HGB BLD-MCNC: 12.5 G/DL (ref 12–17)
HGB UR QL STRIP.AUTO: NEGATIVE
IMM GRANULOCYTES # BLD AUTO: 0.07 THOUSAND/UL (ref 0–0.2)
IMM GRANULOCYTES NFR BLD AUTO: 1 % (ref 0–2)
KETONES UR STRIP-MCNC: NEGATIVE MG/DL
LEUKOCYTE ESTERASE UR QL STRIP: NEGATIVE
LYMPHOCYTES # BLD AUTO: 0.53 THOUSANDS/ÂΜL (ref 0.6–4.47)
LYMPHOCYTES NFR BLD AUTO: 8 % (ref 14–44)
MCH RBC QN AUTO: 28.5 PG (ref 26.8–34.3)
MCHC RBC AUTO-ENTMCNC: 32.9 G/DL (ref 31.4–37.4)
MCV RBC AUTO: 87 FL (ref 82–98)
MONOCYTES # BLD AUTO: 0.63 THOUSAND/ÂΜL (ref 0.17–1.22)
MONOCYTES NFR BLD AUTO: 10 % (ref 4–12)
NEUTROPHILS # BLD AUTO: 5.02 THOUSANDS/ÂΜL (ref 1.85–7.62)
NEUTS SEG NFR BLD AUTO: 75 % (ref 43–75)
NITRITE UR QL STRIP: NEGATIVE
NON-SQ EPI CELLS URNS QL MICRO: ABNORMAL /HPF
NRBC BLD AUTO-RTO: 0 /100 WBCS
PH UR STRIP.AUTO: 7 [PH]
PLATELET # BLD AUTO: 174 THOUSANDS/UL (ref 149–390)
PMV BLD AUTO: 9.9 FL (ref 8.9–12.7)
POTASSIUM SERPL-SCNC: 4.5 MMOL/L (ref 3.5–5.3)
PROT UR STRIP-MCNC: ABNORMAL MG/DL
RBC # BLD AUTO: 4.38 MILLION/UL (ref 3.88–5.62)
RBC #/AREA URNS AUTO: ABNORMAL /HPF
SODIUM SERPL-SCNC: 137 MMOL/L (ref 135–147)
SP GR UR STRIP.AUTO: 1.01 (ref 1–1.03)
UROBILINOGEN UR STRIP-ACNC: <2 MG/DL
WBC # BLD AUTO: 6.63 THOUSAND/UL (ref 4.31–10.16)
WBC #/AREA URNS AUTO: ABNORMAL /HPF

## 2023-08-28 PROCEDURE — 85025 COMPLETE CBC W/AUTO DIFF WBC: CPT

## 2023-08-28 PROCEDURE — 81001 URINALYSIS AUTO W/SCOPE: CPT

## 2023-08-28 PROCEDURE — 82306 VITAMIN D 25 HYDROXY: CPT

## 2023-08-28 PROCEDURE — 80048 BASIC METABOLIC PNL TOTAL CA: CPT

## 2023-08-28 PROCEDURE — 82043 UR ALBUMIN QUANTITATIVE: CPT

## 2023-08-28 PROCEDURE — 82570 ASSAY OF URINE CREATININE: CPT

## 2023-08-28 PROCEDURE — 36415 COLL VENOUS BLD VENIPUNCTURE: CPT

## 2023-08-29 LAB
CREAT UR-MCNC: 64.8 MG/DL
MICROALBUMIN UR-MCNC: 662.8 MG/L
MICROALBUMIN/CREAT 24H UR: 1023 MG/G CREATININE (ref 0–30)

## 2023-09-07 ENCOUNTER — OFFICE VISIT (OUTPATIENT)
Dept: WOUND CARE | Facility: CLINIC | Age: 68
End: 2023-09-07
Payer: MEDICARE

## 2023-09-07 VITALS
WEIGHT: 310 LBS | DIASTOLIC BLOOD PRESSURE: 90 MMHG | SYSTOLIC BLOOD PRESSURE: 179 MMHG | BODY MASS INDEX: 38.54 KG/M2 | HEART RATE: 74 BPM | HEIGHT: 75 IN | TEMPERATURE: 97.2 F | RESPIRATION RATE: 18 BRPM

## 2023-09-07 DIAGNOSIS — E66.01 MORBID OBESITY DUE TO EXCESS CALORIES (HCC): ICD-10-CM

## 2023-09-07 DIAGNOSIS — E13.9 DIABETES 1.5, MANAGED AS TYPE 2 (HCC): ICD-10-CM

## 2023-09-07 DIAGNOSIS — L97.511 DIABETIC ULCER OF OTHER PART OF RIGHT FOOT ASSOCIATED WITH DIABETES MELLITUS DUE TO UNDERLYING CONDITION, LIMITED TO BREAKDOWN OF SKIN (HCC): Primary | ICD-10-CM

## 2023-09-07 DIAGNOSIS — E08.621 DIABETIC ULCER OF OTHER PART OF RIGHT FOOT ASSOCIATED WITH DIABETES MELLITUS DUE TO UNDERLYING CONDITION, LIMITED TO BREAKDOWN OF SKIN (HCC): Primary | ICD-10-CM

## 2023-09-07 PROCEDURE — 99213 OFFICE O/P EST LOW 20 MIN: CPT | Performed by: ORTHOPAEDIC SURGERY

## 2023-09-07 PROCEDURE — 11042 DBRDMT SUBQ TIS 1ST 20SQCM/<: CPT | Performed by: ORTHOPAEDIC SURGERY

## 2023-09-07 RX ORDER — LIDOCAINE 40 MG/G
CREAM TOPICAL ONCE
Status: COMPLETED | OUTPATIENT
Start: 2023-09-07 | End: 2023-09-07

## 2023-09-07 RX ADMIN — LIDOCAINE: 40 CREAM TOPICAL at 08:47

## 2023-09-07 NOTE — PROGRESS NOTES
Patient ID: Golden Correa is a 78 y.o. male Date of Birth 1955       Chief Complaint   Patient presents with   • New Patient Visit     Ulcer right foot       Allergies:  Lisinopril    Diagnosis:   Diagnosis ICD-10-CM Associated Orders   1. Diabetic ulcer of other part of right foot associated with diabetes mellitus due to underlying condition, limited to breakdown of skin ([facility])  E08.621 lidocaine (LMX) 4 % cream    L97.511 Wound miscellaneous orders     Wound cleansing and dressings     Wound off loading      2. Morbid obesity due to excess calories ([address])  E66.01 Ambulatory Referral to Weight Management      3. Diabetes 1.5, managed as type 2 ([address])  E13.9 Ambulatory Referral to Weight Management           Assessment and Plan :  • Initial Evaluation of Singh Grade 1 diabetic foot ulcer with severe callus and drainage. No signs of infection today. • Debrided as below  • Wound management with 5 min Dakins soak followed by Maxorb AG with Cavilon skin prep to wound edges and an offloading felt pad. See wound orders below   • No harsh cleansers such as alcohol, peroxide, or antibacterial soap, do not we nor submerge in water  • Can cleanse with Prophase at dressing changes. • Counseled on importance of frequent elevation of leg decreasing activity level for wound healing. • Proper offloading with off-loading felt and bulky dressing around wound to help off load. Pt was unable to tolerate the Darco OrthoWedge Forefoot Offloading shoe. .  • Recommend proper off-loading with CAM Boot to decrease pressure and callus formation to allow for wound healing. •  A1C results reviewed with the patient today. A1c (6.7)  • Referred to weight management to help with weight loss and diabetes. • Counseled on importance of frequent elevation of leg and decrease exercise/walking.   • Followup with Podiatry in 1 week(s) or call sooner with questions or concerns or any signs of infection such as redness, swelling, increased/purulent drainage, fever, chills, increased severe pain. Subjective:   Pt is a 78 y.o. 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 Pt states wound has been present since February upon completion of chemo for follicular lymphoma. Pt has been following with Dr. Nella Beebe, podiatrist for Brian Saleemer grade 1 DFU on R foot. Pt was prescribed the Darco OrthoWedge Forefoot Offloading shoe but was unable to ambulate with it therefore stopped wearing it. [facility] has been applying neosporin to wound bed and covering with adaptic and DSD with coban. Does not have an odor. Notes yellow green significant drainage.   No smoking, ETOH or drug use. Pt denies any sob, fatigue, N/V, CP, fever or chills.         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[address])   • Hypertension   • Hypercholesteremia   • Hammer toe of right foot   • Stasis dermatitis of both legs   • Diabetic peripheral neuropathy ([address])   • Gout   • 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[address])   • Obesity with body mass index 30 or greater   • Type 2 diabetes mellitus (HCC)   • Retroperitoneal hematoma   • Mesenteric lymphadenopathy   • Acute blood loss anemia   • Heart murmur   • GI bleed   • Pleural effusion   • Chronic venous hypertension (idiopathic) with ulcer of right lower extremity (HCC)   • Rash   • FINA (acute kidney injury) ([address])   • Stage 2 chronic kidney disease   • Hypertensive kidney disease with stage 2 chronic kidney disease   • Other proteinuria   • Obesity, morbid (HCC)   • Follicular lymphoma grade I of lymph nodes of multiple sites ([address])   • Vitamin D deficiency   • Stage 3a chronic kidney disease ([address])     Past Medical History:   Diagnosis Date   • Chronic kidney disease    • Diabetes mellitus ([address])    • Follicular lymphoma ([address])    • High cholesterol    • Hypertension Past Surgical History:   Procedure Laterality Date   • BUNIONECTOMY Right 11/24/2020    Procedure: RIGHT HAV CORRECTION,;  Surgeon: Jatinder Win DPM;  [facility];  Service: Podiatry   • COLONOSCOPY     • INCISION AND DRAINAGE OF WOUND Right 10/05/2016    Procedure: INCISION AND DRAINAGE (I&D) EXTREMITY;  Surgeon: Jatinder Win DPM;  [facility];  Service:    • IR BIOPSY LYMPH NODE  07/08/2021   • IR EMBOLIZATION (SPECIFY VESSEL OR SITE)  07/08/2021   • IR PORT PLACEMENT  9/1/2022   • PILONIDAL CYST EXCISION     • MT CORRECTION HAMMERTOE Right 02/19/2019    Procedure: THIRD HAMMER TOE CORRECTION;  Surgeon: Jatinder Win DPM;  [facility];  Service: Podiatry   • MT RMVL MATEO CTR VAD W/SUBQ PORT/ CTR/PRPH INSJ N/A 3/14/2023    Procedure: REMOVAL VENOUS PORT (PORT-A-CATH)IR;  Surgeon: Emelyn Lang DO;  [facility];  Service: Interventional Radiology   • TOE OSTEOTOMY Right 03/14/2017    Procedure: HAMMERTOE CORRECTION R 2 ;  Surgeon: Jatinder Win DPM;  [facility];  Service:    • TOE OSTEOTOMY Left 11/24/2020    Procedure: LEFT HT CORRECTION TOE;  Surgeon: Jatinder Win DPM;  [facility];  Service: Podiatry   • TONSILLECTOMY  1963     Family History   Problem Relation Age of Onset   • Cancer Father         Leukemia     Social History     Socioeconomic History   • Marital status: /Civil Union     Spouse name: None   • Number of children: None   • Years of education: None   • Highest education level: None   Occupational History   • None   Tobacco Use   • Smoking status: Never   • Smokeless tobacco: Never   • Tobacco comments:     Never smoked but exposed to second hand smoke from birth until 19's   Vaping Use   • Vaping Use: Never used   Substance and Sexual Activity   • Alcohol use: No   • Drug use: No   • Sexual activity: Not Currently     Partners: Female     Birth control/protection: Abstinence   Other Topics Concern   • None   Social History Narrative   • None     Social Determinants of Health     Financial Resource Strain: Not on file   Food Insecurity: Not on file   Transportation Needs: Not on file   Physical Activity: Not on file   Stress: Not on file   Social Connections: Not on file   Intimate Partner Violence: Not on file   Housing Stability: Not on file       Current Outpatient Medications:   •  allopurinol (ZYLOPRIM) 100 mg tablet, Take 3 tablets (300 mg total) by mouth daily (Patient taking differently: Take 100 mg by mouth daily), Disp: 30 tablet, Rfl: 1  •  amLODIPine (NORVASC) 10 mg tablet, take 1 tablet by mouth once daily AT NOON, Disp: , Rfl:   •  aspirin 81 mg chewable tablet, Chew 81 mg daily, Disp: , Rfl:   •  Cholecalciferol 50 MCG (2000 UT) TABS, Take 1 tablet (2,000 Units total) by mouth daily, Disp: , Rfl:   •  hydrochlorothiazide (HYDRODIURIL) 25 mg tablet, Take 25 mg by mouth daily, Disp: , Rfl:   •  losartan (COZAAR) 100 MG tablet, Take 0.5 tablets (50 mg total) by mouth daily (Patient taking differently: Take 100 mg by mouth daily), Disp: , Rfl:   •  metFORMIN (GLUCOPHAGE) 500 mg tablet, Take 1,000 mg by mouth 2 (two) times a day with meals, Disp: , Rfl:   •  metoprolol succinate (TOPROL-XL) 100 mg 24 hr tablet, Take 100 mg by mouth every evening, Disp: , Rfl:   •  Multiple Vitamins-Minerals (Centrum Silver 50+Men) TABS, , Disp: , Rfl:   •  simvastatin (ZOCOR) 40 mg tablet, Take 40 mg by mouth daily at bedtime, Disp: , Rfl:   No current facility-administered medications for this visit. Review of Systems   Constitutional: Negative for appetite change, chills, fatigue, fever and unexpected weight change. HENT: Negative for congestion, hearing loss and postnasal drip. Respiratory: Negative for cough and shortness of breath. Cardiovascular: Negative for leg swelling. Musculoskeletal: Positive for gait problem (unsteady gait). Skin: Positive for wound (Rt foot). Negative for rash.    Neurological: Negative for numbness. Hematological: Does not bruise/bleed easily. Psychiatric/Behavioral: Negative. Objective:  BP (!) 179/90   Pulse 74   Temp (!) 97.2 °F (36.2 °C)   Resp 18   Ht 6' 3" (1.905 m)   Wt (!) 141 kg (310 lb)   BMI 38.75 kg/m²   Pain Score: 0-No pain     Physical Exam  Vitals reviewed. Constitutional:       General: He is not in acute distress. Appearance: Normal appearance. He is well-developed. He is obese. HENT:      Head: Normocephalic and atraumatic. Cardiovascular:      Rate and Rhythm: Normal rate. Pulses:           Dorsalis pedis pulses are 2+ on the right side. Posterior tibial pulses are 2+ on the right side. Pulmonary:      Effort: Pulmonary effort is normal.   Musculoskeletal:      Right lower leg: No edema. Left lower leg: No edema. Feet:    Feet:      Right foot:      Skin integrity: Ulcer present. Comments: Singh grade 1 with yellow green drainage and severe callous formation. Skin:     General: Skin is warm and dry. Findings: Wound (Rt foot) present. Comments: See wound assessment   Neurological:      General: No focal deficit present. Mental Status: He is alert and oriented to person, place, and time. Gait: Gait abnormal.   Psychiatric:         Mood and Affect: Mood and affect normal.         Behavior: Behavior normal. Behavior is cooperative.                    Wound 09/07/23 Diabetic Ulcer Foot Right;Plantar;Posterior (Active)   Enter Singh score: Singh Grade 1: Partial or full-thickness ulcer (superficial) 09/07/23 0840   Wound Description Yellow;Pink 09/07/23 0840   Delisa-wound Assessment Maceration;Callus 09/07/23 0840   Wound Length (cm) 1 cm 09/07/23 0840   Wound Width (cm) 2.5 cm 09/07/23 0840   Wound Depth (cm) 0.4 cm 09/07/23 0840   Wound Surface Area (cm^2) 2.5 cm^2 09/07/23 0840   Wound Volume (cm^3) 1 cm^3 09/07/23 0840   Calculated Wound Volume (cm^3) 1 cm^3 09/07/23 0840   Tunneling in depth located at 7-12 oclock 09/07/23 0840   Undermining 0.6 09/07/23 0840   Drainage Amount Moderate 09/07/23 0840   Drainage Description Green;Serosanguineous; Yellow 09/07/23 0840   Non-staged Wound Description Full thickness 09/07/23 0840   Dressing Status Intact 09/07/23 0840     Debridement   Wound 09/07/23 Diabetic Ulcer Foot Right;Plantar;Posterior    Universal Protocol:  Consent: Verbal consent obtained. Written consent obtained. Risks and benefits: risks, benefits and alternatives were discussed  Consent given by: patient  Time out: Immediately prior to procedure a "time out" was called to verify the correct patient, procedure, equipment, support staff and site/side marked as required. Patient understanding: patient states understanding of the procedure being performed  Patient identity confirmed: verbally with patient      Performed by: PA  Debridement type: surgical  Level of debridement: subcutaneous tissue  Pain control: lidocaine 4%  Post-debridement measurements  Length (cm): 1.5  Width (cm): 2.7  Depth (cm): 1.2  Percent debrided: 100%  Surface Area (cm^2): 4.05  Area debrided (cm^2): 4.05  Volume (cm^3): 4.86  Tissue and other material debrided: subcutaneous tissue  Devitalized tissue debrided: biofilm, callus, exudate, fibrin and slough  Instrument(s) utilized: curette, blade and forceps  Bleeding: medium  Hemostasis obtained with: pressure and silver nitrate  Procedural pain (0-10): 0  Post-procedural pain: 0   Response to treatment: procedure was tolerated well                    Wound Instructions:  Orders Placed This Encounter   Procedures   • Wound miscellaneous orders     Your wound had a chemical debridement today so iut is normal to have a darken appearance     Standing Status:   Future     Standing Expiration Date:   9/7/2024   • Wound cleansing and dressings     Left Foot Wound    Wash your hands with soap and water. Remove old dressing, discard into plastic bag and place in trash.   Cleanse the wound with prophase 5 to 10 minute soak then remove wet gauze and pat dry  prior to applying a clean dressing. Do not use tissue or cotton balls. Do not scrub the wound. Shower no cant get wet in shower     Apply skin prep to skin surrounding wound  Place felt pad to help offload pressure, cut out hole size bigger than wound    Apply maxsorb ag (silver alginate) to the foot wound. Cover with abd pad  Secure with buffy and tape  Change dressing every other day or sooner if needed for drainage    We are Recommending you to see our podiatrist with the wound center     Standing Status:   Future     Standing Expiration Date:   9/7/2024   • Wound off loading     Off-loading Instructions:    Keep weight and pressure off wound at all times. Wear off loading shoe and pad to help reduce pressure, [place] is recommended by provider     Standing Status:   Future     Standing Expiration Date:   9/7/2024   • Ambulatory Referral to Weight Management     Standing Status:   Future     Standing Expiration Date:   9/7/2024     Referral Priority:   Routine     Referral Type:   Consult - AMB     Referral Reason:   Specialty Services Required     Requested Specialty:   Bariatrics     Number of Visits Requested:   1     Expiration Date:   9/7/2024       Keila Traylor PA-C, [facility]      Portions of the record may have been created with voice recognition software. Occasional wrong word or "sound alike" substitutions may have occurred due to the inherent limitations of voice recognition software. Read the chart carefully and recognize, using context, where substitutions have occurred.

## 2023-09-07 NOTE — PATIENT INSTRUCTIONS
Orders Placed This Encounter   Procedures    Wound miscellaneous orders     Your wound had a chemical debridement today so iut is normal to have a darken appearance     Standing Status:   Future     Standing Expiration Date:   9/7/2024    Wound cleansing and dressings     Left Foot Wound    Wash your hands with soap and water. Remove old dressing, discard into plastic bag and place in trash. Cleanse the wound with prophase 5 to 10 minute soak then remove wet gauze and pat dry  prior to applying a clean dressing. Do not use tissue or cotton balls. Do not scrub the wound. Shower no cant get wet in shower     Apply skin prep to skin surrounding wound  Place felt pad to help offload pressure, cut out hole size bigger than wound    Apply maxsorb ag (silver alginate) to the foot wound. Cover with abd pad  Secure with buffy and tape  Change dressing every other day or sooner if needed for drainage    We are Recommending you to see our podiatrist with the wound center     Standing Status:   Future     Standing Expiration Date:   9/7/2024    Wound off loading     Off-loading Instructions:    Keep weight and pressure off wound at all times.  Wear off loading shoe and pad to help reduce pressure, [place] is recommended by provider     Standing Status:   Future     Standing Expiration Date:   9/7/2024

## 2023-09-14 ENCOUNTER — OFFICE VISIT (OUTPATIENT)
Dept: WOUND CARE | Facility: CLINIC | Age: 68
End: 2023-09-14
Payer: MEDICARE

## 2023-09-14 VITALS
TEMPERATURE: 97.3 F | SYSTOLIC BLOOD PRESSURE: 133 MMHG | RESPIRATION RATE: 18 BRPM | DIASTOLIC BLOOD PRESSURE: 71 MMHG | HEART RATE: 89 BPM

## 2023-09-14 DIAGNOSIS — M21.961 METATARSAL DEFORMITY, RIGHT: ICD-10-CM

## 2023-09-14 DIAGNOSIS — E08.621 DIABETIC ULCER OF OTHER PART OF RIGHT FOOT ASSOCIATED WITH DIABETES MELLITUS DUE TO UNDERLYING CONDITION, LIMITED TO BREAKDOWN OF SKIN (HCC): Primary | ICD-10-CM

## 2023-09-14 DIAGNOSIS — L97.511 DIABETIC ULCER OF OTHER PART OF RIGHT FOOT ASSOCIATED WITH DIABETES MELLITUS DUE TO UNDERLYING CONDITION, LIMITED TO BREAKDOWN OF SKIN (HCC): Primary | ICD-10-CM

## 2023-09-14 DIAGNOSIS — E11.42 DIABETIC PERIPHERAL NEUROPATHY (HCC): ICD-10-CM

## 2023-09-14 PROCEDURE — 11042 DBRDMT SUBQ TIS 1ST 20SQCM/<: CPT | Performed by: PODIATRIST

## 2023-09-14 RX ORDER — LIDOCAINE 40 MG/G
CREAM TOPICAL ONCE
Status: COMPLETED | OUTPATIENT
Start: 2023-09-14 | End: 2023-09-14

## 2023-09-14 RX ADMIN — LIDOCAINE: 40 CREAM TOPICAL at 14:08

## 2023-09-14 NOTE — PATIENT INSTRUCTIONS
Orders Placed This Encounter   Procedures    Wound cleansing and dressings      Wound cleansing and dressings        Left Foot Wound     Wash your hands with soap and water. Remove old dressing, discard into plastic bag and place in trash. Cleanse the wound with prophase 5 to 10 minute soak then remove wet gauze and pat dry  prior to applying a clean dressing. Do not use tissue or cotton balls. Do not scrub the wound. Shower no cant get wet in shower      Apply skin prep to skin surrounding wound  Place felt pad to help offload pressure, cut out hole size bigger than wound     Apply maxsorb ag (silver alginate) to the foot wound. Cover with abd pad  Secure with buffy and tape  Change dressing every other day or sooner if needed for drainage       Supplies ordered from [facility] if they have not arrived called them at [phone]    Increase your protein to assist with wound healing     Standing Status:   Future     Standing Expiration Date:   9/14/2024    Wound off loading      Wound off loading      Off-loading Instructions:     Keep weight and pressure off wound at all times.  Wear off loading shoe and pad to help reduce pressure  Use crutches to keep weight off          Standing Status:   Future     Standing Expiration Date:   9/14/2024

## 2023-09-14 NOTE — PROGRESS NOTES
Patient ID: Gerry Traylor is a 78 y.o. male Date of Birth 1955       Chief Complaint   Patient presents with   • Follow Up Wound Care Visit     Right foot wound       Allergies:  Lisinopril    Diagnosis:  1. Diabetic ulcer of other part of right foot associated with diabetes mellitus due to underlying condition, limited to breakdown of skin (HCC)  -     lidocaine (LMX) 4 % cream  -     Wound cleansing and dressings; Future  -     Wound off loading; Future    2. Diabetic peripheral neuropathy ([address])    3. Metatarsal deformity, right       Diagnosis ICD-10-CM Associated Orders   1. Diabetic ulcer of other part of right foot associated with diabetes mellitus due to underlying condition, limited to breakdown of skin (HCC)  E08.621 lidocaine (LMX) 4 % cream    L97.511 Wound cleansing and dressings     Wound off loading      2. Diabetic peripheral neuropathy (HCC)  E11.42       3. Metatarsal deformity, right  M21.961            Assessment & Plan:  See wound orders. 1.  For the [facility] grade 2 ulcer:  Do not step on the foot. Use crutches at all times for complete NWB right foot. Eat healthy to help in wound healing and keep BS low. Avoid soda. 2.  Patient is advised on how foot numbness leads to wounds and non-compliance with WB instructions. 3.  Metatarsal deformity. Use the padded dressing. Patient may need surgical intervention for the deformity. Hopefully the ulcer will heal and patient will need a combination of outpatient podiatry and diabetic shoes/inserts to off-load the foot and present excess callus formation. 4.  Follow up 2 weeks. Subjective: This is a 77 yo male with DM, HTN, venous disease, obesity, CKD and right foot ulcer. Ulcer has been present since February. Patient had a callus that got too large and the ulcer was present underneath. No pain in the ulcer. He walks with a surgical shoe and did get the CAM walker.   Also, the patient made an appointment with weight management. He does drink soda and has been on his feet walking on the foot. There is a history of prior foot surgery in 2020.       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[address])   • Hypertension   • Hypercholesteremia   • Hammer toe of right foot   • Stasis dermatitis of both legs   • Diabetic peripheral neuropathy ([address])   • Gout   • 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[address])   • Obesity with body mass index 30 or greater   • Type 2 diabetes mellitus (HCC)   • Retroperitoneal hematoma   • Mesenteric lymphadenopathy   • Acute blood loss anemia   • Heart murmur   • GI bleed   • Pleural effusion   • Chronic venous hypertension (idiopathic) with ulcer of right lower extremity (HCC)   • Rash   • FINA (acute kidney injury) ([address])   • Stage 2 chronic kidney disease   • Hypertensive kidney disease with stage 2 chronic kidney disease   • Other proteinuria   • Obesity, morbid (HCC)   • Follicular lymphoma grade I of lymph nodes of multiple sites ([address])   • Vitamin D deficiency   • Stage 3a chronic kidney disease ([address])     Past Medical History:   Diagnosis Date   • Chronic kidney disease    • Diabetes mellitus ([address])    • Follicular lymphoma ([address])    • High cholesterol    • Hypertension      Past Surgical History:   Procedure Laterality Date   • BUNIONECTOMY Right 11/24/2020    Procedure: RIGHT HAV CORRECTION,;  Surgeon: Marcelo White DPM;  [facility];  Service: Podiatry   • COLONOSCOPY     • INCISION AND DRAINAGE OF WOUND Right 10/05/2016    Procedure: INCISION AND DRAINAGE (I&D) EXTREMITY;  Surgeon: Marcelo White DPM;  [facility];  Service:    • IR BIOPSY LYMPH NODE  07/08/2021   • IR EMBOLIZATION (SPECIFY VESSEL OR SITE)  07/08/2021   • IR PORT PLACEMENT  9/1/2022   • PILONIDAL CYST EXCISION     • CT CORRECTION HAMMERTOE Right 02/19/2019    Procedure: THIRD HAMMER TOE CORRECTION; Surgeon: Aye Tierney DPM;  [facility];  Service: Podiatry   • MI RMVL MATEO CTR VAD W/SUBQ PORT/ CTR/PRPH INSJ N/A 3/14/2023    Procedure: REMOVAL VENOUS PORT (PORT-A-CATH)IR;  Surgeon: Karron Bosworth, DO;  [facility];  Service: Interventional Radiology   • TOE OSTEOTOMY Right 03/14/2017    Procedure: HAMMERTOE CORRECTION R 2 ;  Surgeon: Aye Tierney DPM;  [facility];  Service:    • TOE OSTEOTOMY Left 11/24/2020    Procedure: LEFT HT CORRECTION TOE;  Surgeon: Aye Tierney DPM;  [facility];  Service: Podiatry   • TONSILLECTOMY  1963     Social History     Socioeconomic History   • Marital status: /Civil Union     Spouse name: None   • Number of children: None   • Years of education: None   • Highest education level: None   Occupational History   • None   Tobacco Use   • Smoking status: Never   • Smokeless tobacco: Never   • Tobacco comments:     Never smoked but exposed to second hand smoke from birth until 19's   Vaping Use   • Vaping Use: Never used   Substance and Sexual Activity   • Alcohol use: No   • Drug use: No   • Sexual activity: Not Currently     Partners: Female     Birth control/protection: Abstinence   Other Topics Concern   • None   Social History Narrative   • None     Social Determinants of Health     Financial Resource Strain: Not on file   Food Insecurity: Not on file   Transportation Needs: Not on file   Physical Activity: Not on file   Stress: Not on file   Social Connections: Not on file   Intimate Partner Violence: Not on file   Housing Stability: Not on file        Current Outpatient Medications:   •  allopurinol (ZYLOPRIM) 100 mg tablet, Take 3 tablets (300 mg total) by mouth daily (Patient taking differently: Take 100 mg by mouth daily), Disp: 30 tablet, Rfl: 1  •  amLODIPine (NORVASC) 10 mg tablet, take 1 tablet by mouth once daily AT NOON, Disp: , Rfl:   •  aspirin 81 mg chewable tablet, Chew 81 mg daily, Disp: , Rfl:   • Cholecalciferol 50 MCG (2000 UT) TABS, Take 1 tablet (2,000 Units total) by mouth daily, Disp: , Rfl:   •  hydrochlorothiazide (HYDRODIURIL) 25 mg tablet, Take 25 mg by mouth daily, Disp: , Rfl:   •  losartan (COZAAR) 100 MG tablet, Take 0.5 tablets (50 mg total) by mouth daily (Patient taking differently: Take 100 mg by mouth daily), Disp: , Rfl:   •  metFORMIN (GLUCOPHAGE) 500 mg tablet, Take 1,000 mg by mouth 2 (two) times a day with meals, Disp: , Rfl:   •  metoprolol succinate (TOPROL-XL) 100 mg 24 hr tablet, Take 100 mg by mouth every evening, Disp: , Rfl:   •  Multiple Vitamins-Minerals (Centrum Silver 50+Men) TABS, , Disp: , Rfl:   •  simvastatin (ZOCOR) 40 mg tablet, Take 40 mg by mouth daily at bedtime, Disp: , Rfl:   No current facility-administered medications for this visit. Family History   Problem Relation Age of Onset   • Cancer Father         Leukemia      Review of Systems   Constitutional: Negative for chills and fever. Respiratory: Negative for shortness of breath. Cardiovascular: Positive for leg swelling. Negative for chest pain. Gastrointestinal: Negative for nausea and vomiting. Skin: Positive for wound. Neurological: Positive for numbness. Psychiatric/Behavioral: Negative for agitation and confusion. Objective:  /71   Pulse 89   Temp (!) 97.3 °F (36.3 °C)   Resp 18     Physical Exam  Cardiovascular:      Pulses:           Dorsalis pedis pulses are 2+ on the right side. Posterior tibial pulses are 2+ on the right side. Musculoskeletal:      Right foot: Prominent metatarsal heads present. Feet:      Right foot:      Protective Sensation: 10 sites tested. 10 sites sensed. Skin integrity: Ulcer present. Comments: Right foot with plantarflexed first metatarsal bone and elevated right hallux. Neurological:      Mental Status: He is alert.              Wound 09/01/22 Incision Chest Anterior;Right (Active)       Wound 03/14/23 Chest Right (Active) Wound 09/07/23 Diabetic Ulcer Foot Right;Plantar;Posterior (Active)   Enter Singh score: Singh Grade 1: Partial or full-thickness ulcer (superficial) 09/14/23 1359   Wound Image   09/14/23 1422   Wound Description Yellow;Pink;Epithelialization 09/14/23 1359   Delisa-wound Assessment Maceration;Callus 09/14/23 1359   Wound Length (cm) 1.5 cm 09/14/23 1359   Wound Width (cm) 2.5 cm 09/14/23 1359   Wound Depth (cm) 0.1 cm 09/14/23 1359   Wound Surface Area (cm^2) 3.75 cm^2 09/14/23 1359   Wound Volume (cm^3) 0.375 cm^3 09/14/23 1359   Calculated Wound Volume (cm^3) 0.38 cm^3 09/14/23 1359   Change in Wound Size % 62 09/14/23 1359   Tunneling in depth located at 7-12 oclock 09/07/23 0840   Undermining 0.6 09/07/23 0840   Drainage Amount Moderate 09/14/23 1359   Drainage Description Serosanguineous; Yellow 09/14/23 1359   Non-staged Wound Description Full thickness 09/14/23 1359   Dressing Status Intact 09/14/23 1359                         Debridement   Wound 09/07/23 Diabetic Ulcer Foot Right;Plantar;Posterior    Universal Protocol:  Consent: Verbal consent obtained.   Consent given by: patient  Patient understanding: patient states understanding of the procedure being performed  Patient identity confirmed: verbally with patient      Performed by: physician  Debridement type: surgical  Level of debridement: subcutaneous tissue  Pain control: lidocaine 1%  Pre-debridement measurements  Length (cm): 1.5  Width (cm): 2.5  Depth (cm): 0.1  Surface Area (cm^2): 3.75  Volume (cm^3): 0.38    Post-debridement measurements  Length (cm): 1.5  Width (cm): 2.5  Depth (cm): 0.1  Percent debrided: 100%  Surface Area (cm^2): 3.75  Area debrided (cm^2): 3.75  Volume (cm^3): 0.38  Tissue and other material debrided: subcutaneous tissue  Devitalized tissue debrided: callus  Instrument(s) utilized: curette  Bleeding: small  Hemostasis obtained with: pressure  Procedural pain (0-10): insensate  Post-procedural pain: insensate Response to treatment: procedure was tolerated well                   Wound Instructions:  Orders Placed This Encounter   Procedures   • Wound cleansing and dressings     • Wound cleansing and dressings        Left Foot Wound     Wash your hands with soap and water. Remove old dressing, discard into plastic bag and place in trash. Cleanse the wound with prophase 5 to 10 minute soak then remove wet gauze and pat dry  prior to applying a clean dressing. Do not use tissue or cotton balls. Do not scrub the wound.      Shower no cant get wet in shower      Apply skin prep to skin surrounding wound  Place felt pad to help offload pressure, cut out hole size bigger than wound     Apply maxsorb ag (silver alginate) to the foot wound. Cover with abd pad  Secure with buffy and tape  Change dressing every other day or sooner if needed for drainage       Supplies ordered from [facility] if they have not arrived called them at [phone]    Increase your protein to assist with wound healing     Standing Status:   Future     Standing Expiration Date:   9/14/2024   • Wound off loading     • Wound off loading      Off-loading Instructions:     Keep weight and pressure off wound at all times. Wear off loading shoe and pad to help reduce pressure  Use crutches to keep weight off          Standing Status:   Future     Standing Expiration Date:   9/14/2024   • Debridement     This order was created via procedure documentation         Susie Choi DPM      Portions of the record may have been created with voice recognition software. Occasional wrong word or "sound a like" substitutions may have occurred due to the inherent limitations of voice recognition software. Read the chart carefully and recognize, using context, where substitutions have occurred.

## 2023-09-28 ENCOUNTER — OFFICE VISIT (OUTPATIENT)
Dept: WOUND CARE | Facility: CLINIC | Age: 68
End: 2023-09-28
Payer: MEDICARE

## 2023-09-28 VITALS
RESPIRATION RATE: 18 BRPM | DIASTOLIC BLOOD PRESSURE: 90 MMHG | HEART RATE: 84 BPM | TEMPERATURE: 97.3 F | SYSTOLIC BLOOD PRESSURE: 174 MMHG

## 2023-09-28 DIAGNOSIS — E08.621 DIABETIC ULCER OF OTHER PART OF RIGHT FOOT ASSOCIATED WITH DIABETES MELLITUS DUE TO UNDERLYING CONDITION, LIMITED TO BREAKDOWN OF SKIN (HCC): Primary | ICD-10-CM

## 2023-09-28 DIAGNOSIS — L97.511 DIABETIC ULCER OF OTHER PART OF RIGHT FOOT ASSOCIATED WITH DIABETES MELLITUS DUE TO UNDERLYING CONDITION, LIMITED TO BREAKDOWN OF SKIN (HCC): Primary | ICD-10-CM

## 2023-09-28 PROCEDURE — 11042 DBRDMT SUBQ TIS 1ST 20SQCM/<: CPT | Performed by: PODIATRIST

## 2023-09-28 NOTE — PROGRESS NOTES
Patient ID: Christine Hastings is a 80 y.o. male Date of Birth 1955       Chief Complaint   Patient presents with   • Follow Up Wound Care Visit     Right foot wound       Allergies:  Lisinopril    Diagnosis:  1. Diabetic ulcer of other part of right foot associated with diabetes mellitus due to underlying condition, limited to breakdown of skin ([address])  -     Wound cleansing and dressings; Future  -     Wound off loading; Future       Diagnosis ICD-10-CM Associated Orders   1. Diabetic ulcer of other part of right foot associated with diabetes mellitus due to underlying condition, limited to breakdown of skin ([address])  I78.244 Wound cleansing and dressings    L97.511 Wound off loading           Assessment & Plan:  See wound orders. This is a rosa grade 2 ulcer of the right foot. Patient will need to continue to off-load and stay off the foot. Patient will use Dakin's solution as a wound cleanser with bandage changes. Prescription for the solution was called to [facility]. Follow up one week. Subjective: The patient notes he only walked to the bathroom twice a day. He got felt pads from the internet and washed the site with prophase. Patient also ate protein like chicken and steak. No pain in the foot.       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[address])   • Hypertension   • Hypercholesteremia   • Hammer toe of right foot   • Stasis dermatitis of both legs   • Diabetic peripheral neuropathy ([address])   • Gout   • 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[address])   • Obesity with body mass index 30 or greater   • Type 2 diabetes mellitus (HCC)   • Retroperitoneal hematoma   • Mesenteric lymphadenopathy   • Acute blood loss anemia   • Heart murmur   • GI bleed   • Pleural effusion   • Chronic venous hypertension (idiopathic) with ulcer of right lower extremity (HCC)   • Rash   • FINA (acute kidney injury) ([address])   • Stage 2 chronic kidney disease   • Hypertensive kidney disease with stage 2 chronic kidney disease   • Other proteinuria   • Obesity, morbid (HCC)   • Follicular lymphoma grade I of lymph nodes of multiple sites ([address])   • Vitamin D deficiency   • Stage 3a chronic kidney disease ([address])     Past Medical History:   Diagnosis Date   • Chronic kidney disease    • Diabetes mellitus ([address])    • Follicular lymphoma ([address])    • High cholesterol    • Hypertension      Past Surgical History:   Procedure Laterality Date   • BUNIONECTOMY Right 11/24/2020    Procedure: RIGHT HAV CORRECTION,;  Surgeon: Tony Parmar DPM;  [facility];  Service: Podiatry   • COLONOSCOPY     • INCISION AND DRAINAGE OF WOUND Right 10/05/2016    Procedure: INCISION AND DRAINAGE (I&D) EXTREMITY;  Surgeon: Tony Parmar DPM;  [facility];  Service:    • IR BIOPSY LYMPH NODE  07/08/2021   • IR EMBOLIZATION (SPECIFY VESSEL OR SITE)  07/08/2021   • IR PORT PLACEMENT  9/1/2022   • PILONIDAL CYST EXCISION     • GA CORRECTION HAMMERTOE Right 02/19/2019    Procedure: THIRD HAMMER TOE CORRECTION;  Surgeon: Tony Parmar DPM;  [facility];  Service: Podiatry   • GA RMVL MATEO CTR VAD W/SUBQ PORT/ CTR/PRPH INSJ N/A 3/14/2023    Procedure: REMOVAL VENOUS PORT (PORT-A-CATH)IR;  Surgeon: Katarzyna Grimes DO;  [facility];  Service: Interventional Radiology   • TOE OSTEOTOMY Right 03/14/2017    Procedure: HAMMERTOE CORRECTION R 2 ;  Surgeon: Tony Parmar DPM;  [facility];  Service:    • TOE OSTEOTOMY Left 11/24/2020    Procedure: LEFT HT CORRECTION TOE;  Surgeon: Tony Parmar DPM;  [facility];  Service: Podiatry   • TONSILLECTOMY  1963     Social History     Socioeconomic History   • Marital status: /Civil Union     Spouse name: Not on file   • Number of children: Not on file   • Years of education: Not on file   • Highest education level: Not on file   Occupational History   • Not on file   Tobacco Use   • Smoking status: Never   • Smokeless tobacco: Never   • Tobacco comments:     Never smoked but exposed to second hand smoke from birth until 19's   Vaping Use   • Vaping Use: Never used   Substance and Sexual Activity   • Alcohol use: No   • Drug use: No   • Sexual activity: Not Currently     Partners: Female     Birth control/protection: Abstinence   Other Topics Concern   • Not on file   Social History Narrative   • Not on file     Social Determinants of Health     Financial Resource Strain: Not on file   Food Insecurity: Not on file   Transportation Needs: Not on file   Physical Activity: Not on file   Stress: Not on file   Social Connections: Not on file   Intimate Partner Violence: Not on file   Housing Stability: Not on file        Current Outpatient Medications:   •  allopurinol (ZYLOPRIM) 100 mg tablet, Take 3 tablets (300 mg total) by mouth daily (Patient taking differently: Take 100 mg by mouth daily), Disp: 30 tablet, Rfl: 1  •  amLODIPine (NORVASC) 10 mg tablet, take 1 tablet by mouth once daily AT NOON, Disp: , Rfl:   •  aspirin 81 mg chewable tablet, Chew 81 mg daily, Disp: , Rfl:   •  Cholecalciferol 50 MCG (2000 UT) TABS, Take 1 tablet (2,000 Units total) by mouth daily, Disp: , Rfl:   •  hydrochlorothiazide (HYDRODIURIL) 25 mg tablet, Take 25 mg by mouth daily, Disp: , Rfl:   •  losartan (COZAAR) 100 MG tablet, Take 0.5 tablets (50 mg total) by mouth daily (Patient taking differently: Take 100 mg by mouth daily), Disp: , Rfl:   •  metFORMIN (GLUCOPHAGE) 500 mg tablet, Take 1,000 mg by mouth 2 (two) times a day with meals, Disp: , Rfl:   •  metoprolol succinate (TOPROL-XL) 100 mg 24 hr tablet, Take 100 mg by mouth every evening, Disp: , Rfl:   •  Multiple Vitamins-Minerals (Centrum Silver 50+Men) TABS, , Disp: , Rfl:   •  simvastatin (ZOCOR) 40 mg tablet, Take 40 mg by mouth daily at bedtime, Disp: , Rfl:   Family History   Problem Relation Age of Onset   • Cancer Father         Leukemia      Review of Systems   Constitutional: Negative for chills and fever. Objective:  BP (!) 174/90   Pulse 84   Temp (!) 97.3 °F (36.3 °C)   Resp 18     Physical Exam  Neurological:      Mental Status: He is alert. Wound 09/01/22 Incision Chest Anterior;Right (Active)       Wound 03/14/23 Chest Right (Active)       Wound 09/07/23 Diabetic Ulcer Foot Right;Plantar;Posterior (Active)   Enter Shauna Glover score: Shauna Glover Grade 1: Partial or full-thickness ulcer (superficial) 09/28/23 1337   Wound Image   09/28/23 1337   Wound Description Yellow;Pink;Epithelialization; Beefy red 09/28/23 1337   Delisa-wound Assessment Maceration;Callus 09/28/23 1337   Wound Length (cm) 1.1 cm 09/28/23 1337   Wound Width (cm) 1.6 cm 09/28/23 1337   Wound Depth (cm) 0.1 cm 09/28/23 1337   Wound Surface Area (cm^2) 1.76 cm^2 09/28/23 1337   Wound Volume (cm^3) 0.176 cm^3 09/28/23 1337   Calculated Wound Volume (cm^3) 0.18 cm^3 09/28/23 1337   Change in Wound Size % 82 09/28/23 1337   Tunneling in depth located at 9-11 oclock 09/28/23 1337   Undermining 0.3 09/28/23 1337   Drainage Amount Moderate 09/28/23 1337   Drainage Description Serosanguineous; Yellow;Green; Foul smelling 09/28/23 1337   Non-staged Wound Description Full thickness 09/28/23 1337   Dressing Status Intact 09/28/23 1337                         Debridement   Universal Protocol:  Consent: Verbal consent obtained.   Consent given by: patient  Patient understanding: patient states understanding of the procedure being performed  Patient identity confirmed: verbally with patient      Performed by: physician  Debridement type: surgical  Level of debridement: subcutaneous tissue  Pain control: lidocaine 1%  Pre-debridement measurements  Length (cm): 1.1  Width (cm): 1.6  Depth (cm): 0.1  Surface Area (cm^2): 1.76  Volume (cm^3): 0.18    Post-debridement measurements  Length (cm): 1.2  Width (cm): 1.7  Depth (cm): 0.2  Percent debrided: 100%  Surface Area (cm^2): 2.04  Area debrided (cm^2): 2.04  Volume (cm^3): 0.41  Tissue and other material debrided: subcutaneous tissue  Devitalized tissue debrided: callus  Instrument(s) utilized: curette  Bleeding: medium  Hemostasis obtained with: pressure and silver nitrate  Procedural pain (0-10): insensate  Post-procedural pain: insensate   Response to treatment: procedure was tolerated well                   Wound Instructions:  Orders Placed This Encounter   Procedures   • Wound cleansing and dressings     Left Foot Wound     Wash your hands with soap and water. Remove old dressing, discard into plastic bag and place in trash. Cleanse the wound with Dakins solution 5 to 10 minute soak then remove wet gauze and pat dry prior to applying a clean dressing. Do not use tissue or cotton balls. Do not scrub the wound.      Shower: no cant get wet in shower      Apply skin prep to skin surrounding wound  Place felt pad to help offload pressure, cut out hole size bigger than wound     Apply maxsorb ag (silver alginate) to the foot wound. Cover with abd pad  Secure with buffy and tape  Change dressing every other day or sooner if needed for drainage      Increase your protein to assist with wound healing               Standing Status:   Future     Standing Expiration Date:   9/28/2024   • Wound off loading     Keep weight and pressure off wound at all times. Wear off loading shoe and pad to help reduce pressure. Felt pad around wound to off load  Use crutches to keep weight off        Standing Status:   Future     Standing Expiration Date:   9/28/2024         Susie Pond DPM      Portions of the record may have been created with voice recognition software. Occasional wrong word or "sound a like" substitutions may have occurred due to the inherent limitations of voice recognition software. Read the chart carefully and recognize, using context, where substitutions have occurred.

## 2023-09-28 NOTE — PATIENT INSTRUCTIONS
Orders Placed This Encounter   Procedures    Wound cleansing and dressings     Left Foot Wound     Wash your hands with soap and water. Remove old dressing, discard into plastic bag and place in trash. Cleanse the wound with Dakins solution 5 to 10 minute soak then remove wet gauze and pat dry prior to applying a clean dressing. Do not use tissue or cotton balls. Do not scrub the wound. Shower: no cant get wet in shower      Apply skin prep to skin surrounding wound  Place felt pad to help offload pressure, cut out hole size bigger than wound     Apply maxsorb ag (silver alginate) to the foot wound. Cover with abd pad  Secure with buffy and tape  Change dressing every other day or sooner if needed for drainage      Increase your protein to assist with wound healing               Standing Status:   Future     Standing Expiration Date:   9/28/2024    Wound off loading     Keep weight and pressure off wound at all times. Wear off loading shoe and pad to help reduce pressure.   Felt pad around wound to off load  Use crutches to keep weight off        Standing Status:   Future     Standing Expiration Date:   9/28/2024

## 2023-10-05 ENCOUNTER — OFFICE VISIT (OUTPATIENT)
Dept: WOUND CARE | Facility: CLINIC | Age: 68
End: 2023-10-05

## 2023-10-05 VITALS
HEART RATE: 84 BPM | TEMPERATURE: 97.1 F | RESPIRATION RATE: 18 BRPM | DIASTOLIC BLOOD PRESSURE: 63 MMHG | SYSTOLIC BLOOD PRESSURE: 130 MMHG

## 2023-10-05 DIAGNOSIS — L97.511 DIABETIC ULCER OF OTHER PART OF RIGHT FOOT ASSOCIATED WITH DIABETES MELLITUS DUE TO UNDERLYING CONDITION, LIMITED TO BREAKDOWN OF SKIN (HCC): Primary | ICD-10-CM

## 2023-10-05 DIAGNOSIS — E08.621 DIABETIC ULCER OF OTHER PART OF RIGHT FOOT ASSOCIATED WITH DIABETES MELLITUS DUE TO UNDERLYING CONDITION, LIMITED TO BREAKDOWN OF SKIN (HCC): Primary | ICD-10-CM

## 2023-10-05 NOTE — PATIENT INSTRUCTIONS
Orders Placed This Encounter   Procedures    Wound cleansing and dressings     Left Foot Wound     Wash your hands with soap and water. Remove old dressing, discard into plastic bag and place in trash. Cleanse the wound with Dakins solution 5 to 10 minute soak then remove wet gauze and pat dry prior to applying a clean dressing. Do not use tissue or cotton balls. Do not scrub the wound. Shower: no cant get wet in shower      Apply skin prep to skin surrounding wound  Place felt pad to help offload pressure, cut out hole size bigger than wound     Apply maxsorb ag (silver alginate) to the foot wound. Cover with abd pad  Secure with buffy and tape  Change dressing every other day or sooner if needed for drainage           Standing Status:   Future     Standing Expiration Date:   10/5/2024    Wound off loading     Keep weight and pressure off wound at all times. Wear off loading shoe and pad to help reduce pressure.   Felt pad around wound to off load  Use crutches to keep weight off     Standing Status:   Future     Standing Expiration Date:   10/5/2024

## 2023-10-05 NOTE — PROGRESS NOTES
Patient ID: Aracelis Victoria is a 80 y.o. male Date of Birth 1955       Chief Complaint   Patient presents with   • Follow Up Wound Care Visit     Right foot wound       Allergies:  Lisinopril    Diagnosis:  1. Diabetic ulcer of other part of right foot associated with diabetes mellitus due to underlying condition, limited to breakdown of skin ([address])  -     Wound cleansing and dressings; Future  -     Wound off loading; Future       Diagnosis ICD-10-CM Associated Orders   1. Diabetic ulcer of other part of right foot associated with diabetes mellitus due to underlying condition, limited to breakdown of skin ([address])  J23.907 Wound cleansing and dressings    L97.511 Wound off loading           Assessment & Plan:  See wound orders. 1.  For this rosa 2 ulcer right foot, the patient needs to stay off the foot. There is a significant amount of build up on the foot from walking and standing. Patient must use 2 crutches for ambulating to be PWB right foot. Heel contact, but no forefoot contact. 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 The patient is warned on infection potential and limb loss for lack of healing. 2.  Continue local wound care as ordered. Continue to eat a healthy diet of chicken and vegetables and avoid rice and noodles. 3.  Follow up one week. Subjective: The patient states he was very busy this week. 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 He was on his feet a lot at the new business.       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[address])   • Hypertension   • Hypercholesteremia   • Hammer toe of right foot   • Stasis dermatitis of both legs   • Diabetic peripheral neuropathy ([address])   • Gout   • 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[address])   • Obesity with body mass index 30 or greater   • Type 2 diabetes mellitus ([address])   • Retroperitoneal hematoma   • Mesenteric lymphadenopathy   • Acute blood loss anemia   • Heart murmur   • GI bleed   • Pleural effusion   • Chronic venous hypertension (idiopathic) with ulcer of right lower extremity (HCC)   • Rash   • FINA (acute kidney injury) ([address])   • Stage 2 chronic kidney disease   • Hypertensive kidney disease with stage 2 chronic kidney disease   • Other proteinuria   • Obesity, morbid (HCC)   • Follicular lymphoma grade I of lymph nodes of multiple sites ([address])   • Vitamin D deficiency   • Stage 3a chronic kidney disease ([address])     Past Medical History:   Diagnosis Date   • Chronic kidney disease    • Diabetes mellitus ([address])    • Follicular lymphoma ([address])    • High cholesterol    • Hypertension      Past Surgical History:   Procedure Laterality Date   • BUNIONECTOMY Right 11/24/2020    Procedure: RIGHT HAV CORRECTION,;  Surgeon: Luis Miguel Velazquez DPM;  [facility];  Service: Podiatry   • COLONOSCOPY     • INCISION AND DRAINAGE OF WOUND Right 10/05/2016    Procedure: INCISION AND DRAINAGE (I&D) EXTREMITY;  Surgeon: Luis Miguel Velazquez DPM;  [facility];  Service:    • IR BIOPSY LYMPH NODE  07/08/2021   • IR EMBOLIZATION (SPECIFY VESSEL OR SITE)  07/08/2021   • IR PORT PLACEMENT  9/1/2022   • PILONIDAL CYST EXCISION     • [place] CORRECTION HAMMERTOE Right 02/19/2019    Procedure: THIRD HAMMER TOE CORRECTION;  Surgeon: Luis Miguel Velazquez DPM;  [facility];  Service: Podiatry   • PA RMVL MATEO CTR VAD W/SUBQ PORT/ CTR/PRPH INSJ N/A 3/14/2023    Procedure: REMOVAL VENOUS PORT (PORT-A-CATH)IR;  Surgeon: Braulio Kruse DO;  [facility];  Service: Interventional Radiology   • TOE OSTEOTOMY Right 03/14/2017    Procedure: HAMMERTOE CORRECTION R 2 ;  Surgeon: Luis Miguel Velazquez DPM;  [facility];  Service:    • TOE OSTEOTOMY Left 11/24/2020    Procedure: LEFT HT CORRECTION TOE;  Surgeon: Luis Miguel Velazquez DPM;  [facility];  Service: Podiatry   • TONSILLECTOMY  1963     Social History     Socioeconomic History   • Marital status: /Civil Union     Spouse name: None   • Number of children: None   • Years of education: None   • Highest education level: None   Occupational History   • None   Tobacco Use   • Smoking status: Never   • Smokeless tobacco: Never   • Tobacco comments:     Never smoked but exposed to second hand smoke from birth until 19's   Vaping Use   • Vaping Use: Never used   Substance and Sexual Activity   • Alcohol use: No   • Drug use: No   • Sexual activity: Not Currently     Partners: Female     Birth control/protection: Abstinence   Other Topics Concern   • None   Social History Narrative   • None     Social Determinants of Health     Financial Resource Strain: Not on file   Food Insecurity: Not on file   Transportation Needs: Not on file   Physical Activity: Not on file   Stress: Not on file   Social Connections: Not on file   Intimate Partner Violence: Not on file   Housing Stability: Not on file        Current Outpatient Medications:   •  allopurinol (ZYLOPRIM) 100 mg tablet, Take 3 tablets (300 mg total) by mouth daily (Patient taking differently: Take 100 mg by mouth daily), Disp: 30 tablet, Rfl: 1  •  amLODIPine (NORVASC) 10 mg tablet, take 1 tablet by mouth once daily AT NOON, Disp: , Rfl:   •  aspirin 81 mg chewable tablet, Chew 81 mg daily, Disp: , Rfl:   •  Cholecalciferol 50 MCG (2000 UT) TABS, Take 1 tablet (2,000 Units total) by mouth daily, Disp: , Rfl:   •  hydrochlorothiazide (HYDRODIURIL) 25 mg tablet, Take 25 mg by mouth daily, Disp: , Rfl:   •  losartan (COZAAR) 100 MG tablet, Take 0.5 tablets (50 mg total) by mouth daily (Patient taking differently: Take 100 mg by mouth daily), Disp: , Rfl:   •  metFORMIN (GLUCOPHAGE) 500 mg tablet, Take 1,000 mg by mouth 2 (two) times a day with meals, Disp: , Rfl:   •  metoprolol succinate (TOPROL-XL) 100 mg 24 hr tablet, Take 100 mg by mouth every evening, Disp: , Rfl:   • Multiple Vitamins-Minerals (Centrum Silver 50+Men) TABS, , Disp: , Rfl:   •  simvastatin (ZOCOR) 40 mg tablet, Take 40 mg by mouth daily at bedtime, Disp: , Rfl:   Family History   Problem Relation Age of Onset   • Cancer Father         Leukemia      Review of Systems      Objective:  /63   Pulse 84   Temp (!) 97.1 °F (36.2 °C)   Resp 18     Physical Exam        Wound 09/01/22 Incision Chest Anterior;Right (Active)       Wound 03/14/23 Chest Right (Active)       Wound 09/07/23 Diabetic Ulcer Foot Right;Plantar;Posterior (Active)   Enter Singh score: Singh Grade 1: Partial or full-thickness ulcer (superficial) 10/05/23 1348   Wound Image   10/05/23 1348   Wound Description White;Lucky 10/05/23 1348   Delisa-wound Assessment Maceration;Callus 10/05/23 1348   Wound Length (cm) 1.5 cm 10/05/23 1348   Wound Width (cm) 2.4 cm 10/05/23 1348   Wound Depth (cm) 0.3 cm 10/05/23 1348   Wound Surface Area (cm^2) 3.6 cm^2 10/05/23 1348   Wound Volume (cm^3) 1.08 cm^3 10/05/23 1348   Calculated Wound Volume (cm^3) 1.08 cm^3 10/05/23 1348   Change in Wound Size % -8 10/05/23 1348   Tunneling in depth located at 1-12 o'clock 10/05/23 1348   Undermining 0.3 10/05/23 1348   Drainage Amount Moderate 10/05/23 1348   Drainage Description Serosanguineous 10/05/23 1348   Non-staged Wound Description Full thickness 10/05/23 1348   Dressing Status Intact 10/05/23 1348                         Procedures             Wound Instructions:  Orders Placed This Encounter   Procedures   • Wound cleansing and dressings     Left Foot Wound     Wash your hands with soap and water. Remove old dressing, discard into plastic bag and place in trash. Cleanse the wound with Dakins solution 5 to 10 minute soak then remove wet gauze and pat dry prior to applying a clean dressing. Do not use tissue or cotton balls.  Do not scrub the wound.      Shower: no cant get wet in shower      Apply skin prep to skin surrounding wound  Place felt pad to help offload pressure, cut out hole size bigger than wound     Apply maxsorb ag (silver alginate) to the foot wound. Cover with abd pad  Secure with buffy and tape  Change dressing every other day or sooner if needed for drainage           Standing Status:   Future     Standing Expiration Date:   10/5/2024   • Wound off loading     Keep weight and pressure off wound at all times. Wear off loading shoe and pad to help reduce pressure. Felt pad around wound to off load  Use crutches to keep weight off     Standing Status:   Future     Standing Expiration Date:   10/5/2024         Susie Flanagan DPM      Portions of the record may have been created with voice recognition software. Occasional wrong word or "sound a like" substitutions may have occurred due to the inherent limitations of voice recognition software. Read the chart carefully and recognize, using context, where substitutions have occurred.

## 2023-10-06 ENCOUNTER — TELEPHONE (OUTPATIENT)
Dept: NEPHROLOGY | Facility: CLINIC | Age: 68
End: 2023-10-06

## 2023-10-06 NOTE — TELEPHONE ENCOUNTER
I called and left message on patients answering machine confirming appt for Monday 10/09/2023 with Dr. Nena Israel

## 2023-10-09 ENCOUNTER — OFFICE VISIT (OUTPATIENT)
Dept: NEPHROLOGY | Facility: CLINIC | Age: 68
End: 2023-10-09
Payer: MEDICARE

## 2023-10-09 VITALS
TEMPERATURE: 97.8 F | BODY MASS INDEX: 26.73 KG/M2 | HEIGHT: 75 IN | RESPIRATION RATE: 16 BRPM | OXYGEN SATURATION: 97 % | DIASTOLIC BLOOD PRESSURE: 90 MMHG | SYSTOLIC BLOOD PRESSURE: 164 MMHG | HEART RATE: 80 BPM | WEIGHT: 215 LBS

## 2023-10-09 DIAGNOSIS — N18.31 STAGE 3A CHRONIC KIDNEY DISEASE (HCC): Primary | ICD-10-CM

## 2023-10-09 DIAGNOSIS — N18.2 HYPERTENSIVE KIDNEY DISEASE WITH STAGE 2 CHRONIC KIDNEY DISEASE: ICD-10-CM

## 2023-10-09 DIAGNOSIS — R80.8 OTHER PROTEINURIA: ICD-10-CM

## 2023-10-09 DIAGNOSIS — E55.9 VITAMIN D DEFICIENCY: ICD-10-CM

## 2023-10-09 DIAGNOSIS — I12.9 HYPERTENSIVE KIDNEY DISEASE WITH STAGE 2 CHRONIC KIDNEY DISEASE: ICD-10-CM

## 2023-10-09 DIAGNOSIS — I12.9 HYPERTENSIVE KIDNEY DISEASE WITH STAGE 3 CHRONIC KIDNEY DISEASE, UNSPECIFIED WHETHER STAGE 3A OR 3B CKD (HCC): ICD-10-CM

## 2023-10-09 DIAGNOSIS — N18.30 HYPERTENSIVE KIDNEY DISEASE WITH STAGE 3 CHRONIC KIDNEY DISEASE, UNSPECIFIED WHETHER STAGE 3A OR 3B CKD (HCC): ICD-10-CM

## 2023-10-09 DIAGNOSIS — N18.30 TYPE 2 DIABETES MELLITUS WITH STAGE 3 CHRONIC KIDNEY DISEASE, UNSPECIFIED WHETHER LONG TERM INSULIN USE, UNSPECIFIED WHETHER STAGE 3A OR 3B CKD (HCC): ICD-10-CM

## 2023-10-09 DIAGNOSIS — E11.22 TYPE 2 DIABETES MELLITUS WITH STAGE 3 CHRONIC KIDNEY DISEASE, UNSPECIFIED WHETHER LONG TERM INSULIN USE, UNSPECIFIED WHETHER STAGE 3A OR 3B CKD (HCC): ICD-10-CM

## 2023-10-09 PROCEDURE — 99214 OFFICE O/P EST MOD 30 MIN: CPT | Performed by: INTERNAL MEDICINE

## 2023-10-09 RX ORDER — LOSARTAN POTASSIUM 100 MG/1
100 TABLET ORAL DAILY
Start: 2023-10-09

## 2023-10-09 NOTE — PROGRESS NOTES
NEPHROLOGY OFFICE FOLLOW UP  Sharon Rivera 80 y.o.male  MRN: [id number]    Encounter: [id number] DATE: 10/9/2023    REASON FOR VISIT: Sharon Rivera is a 80 y.o. male who is here on 10/9/2023 for further management of chronic kidney disease. HPI:    This is 81-year-old male with past medical history significant for hypertension, diabetes type 2, hyperlipidemia, returns to the Nephrology office for further management of CKD stage 3. Upon review of old medical records, previously known baseline creatinine was thought to be around 1.2. Patient was earlier found to have mesenteric and retroperitoneal lymphadenopathy. Patient had underwent lymph node biopsy in the pas and was diagnosed with follicular lymphoma. Patient is currently being followed by oncologist and in the interim has received chemotherapy. Patient has received the last round of chemotherapy on 2/2/2023 and had underwent PET scan on 2/20/2023 and showed no activity in the lymph node area and currently chemotherapy is on hold. Previously patient had developed FINA and losartan was discontinued during the hospital stay. Patient was found to have significant proteinuria and I have restarted losartan earlier. Patient has underlying hypertension and according to patient blood pressures under well control with current antihypertensive medications    Patient has diabetes type 2 and currently taking metformin and according to patient diabetes is currently under well control. Recently patient had developed right foot diabetic ulcer and now being followed by podiatrist at [facility] and I personally reviewed their office note with recommendation from 10/5/2023. Patient also have underlying vitamin D deficiency and currently taking cholecalciferol. Patient takes all the medications as prescribed and denies use of NSAIDs . REVIEW OF SYSTEMS:    Review of Systems   Constitutional: Negative for chills and fever.    HENT: Negative for nosebleeds and sore throat. Eyes: Negative for photophobia and pain. Respiratory: Negative for choking and wheezing. Cardiovascular: Negative for chest pain and palpitations. Gastrointestinal: Negative for blood in stool. Genitourinary: Negative for hematuria. Musculoskeletal: Negative for neck stiffness. Skin: Negative for pallor. Neurological: Negative for seizures and syncope. Psychiatric/Behavioral: Negative for confusion and suicidal ideas.          PAST MEDICAL HISTORY:  Past Medical History:   Diagnosis Date   • Chronic kidney disease    • Diabetes mellitus ([address])    • Follicular lymphoma ([address])    • High cholesterol    • Hypertension        PAST SURGICAL HISTORY:  Past Surgical History:   Procedure Laterality Date   • BUNIONECTOMY Right 11/24/2020    Procedure: RIGHT HAV CORRECTION,;  Surgeon: Jatinder Win DPM;  [facility];  Service: Podiatry   • COLONOSCOPY     • INCISION AND DRAINAGE OF WOUND Right 10/05/2016    Procedure: INCISION AND DRAINAGE (I&D) EXTREMITY;  Surgeon: Jatinder Win DPM;  [facility];  Service:    • IR BIOPSY LYMPH NODE  07/08/2021   • IR EMBOLIZATION (SPECIFY VESSEL OR SITE)  07/08/2021   • IR PORT PLACEMENT  9/1/2022   • PILONIDAL CYST EXCISION     • [place] CORRECTION HAMMERTOE Right 02/19/2019    Procedure: THIRD HAMMER TOE CORRECTION;  Surgeon: Jatinder Win DPM;  [facility];  Service: Podiatry   • [place] RMVL MATEO CTR VAD W/SUBQ PORT/ CTR/PRPH INSJ N/A 3/14/2023    Procedure: REMOVAL VENOUS PORT (PORT-A-CATH)IR;  Surgeon: Emelyn Lang DO;  [facility];  Service: Interventional Radiology   • TOE OSTEOTOMY Right 03/14/2017    Procedure: HAMMERTOE CORRECTION R 2 ;  Surgeon: Jatinder Win DPM;  [facility];  Service:    • TOE OSTEOTOMY Left 11/24/2020    Procedure: LEFT HT CORRECTION TOE;  Surgeon: Jatinder Win DPM;  [facility];  Service: Podiatry   • TONSILLECTOMY  1963       SOCIAL HISTORY:  Social History Substance and Sexual Activity   Alcohol Use No     Social History     Substance and Sexual Activity   Drug Use No     Social History     Tobacco Use   Smoking Status Never   Smokeless Tobacco Never   Tobacco Comments    Never smoked but exposed to second hand smoke from birth until 19's       FAMILY HISTORY:  Family History   Problem Relation Age of Onset   • Cancer Father         Leukemia       ALLERGY:  Allergies   Allergen Reactions   • Lisinopril Cough       MEDICATIONS:    Current Outpatient Medications:   •  allopurinol (ZYLOPRIM) 100 mg tablet, Take 3 tablets (300 mg total) by mouth daily (Patient taking differently: Take 100 mg by mouth 2 (two) times a day), Disp: 30 tablet, Rfl: 1  •  amLODIPine (NORVASC) 10 mg tablet, take 1 tablet by mouth once daily AT NOON, Disp: , Rfl:   •  aspirin 81 mg chewable tablet, Chew 81 mg daily, Disp: , Rfl:   •  Cholecalciferol 125 MCG (5000 UT) TABS, Take 5,000 Units by mouth in the morning, Disp: , Rfl:   •  Empagliflozin (Jardiance) 25 MG TABS, Take 1 tablet (25 mg total) by mouth every morning, Disp: 90 tablet, Rfl: 2  •  hydrochlorothiazide (HYDRODIURIL) 25 mg tablet, Take 25 mg by mouth daily, Disp: , Rfl:   •  losartan (COZAAR) 100 MG tablet, Take 1 tablet (100 mg total) by mouth daily, Disp: , Rfl:   •  metFORMIN (GLUCOPHAGE) 500 mg tablet, Take 1,000 mg by mouth 2 (two) times a day with meals, Disp: , Rfl:   •  metoprolol succinate (TOPROL-XL) 100 mg 24 hr tablet, Take 100 mg by mouth every evening, Disp: , Rfl:   •  Multiple Vitamins-Minerals (Centrum Silver 50+Men) TABS, , Disp: , Rfl:   •  simvastatin (ZOCOR) 40 mg tablet, Take 40 mg by mouth daily at bedtime, Disp: , Rfl:   •  Cholecalciferol 50 MCG (2000 UT) TABS, Take 1 tablet (2,000 Units total) by mouth daily (Patient not taking: Reported on 10/9/2023), Disp: , Rfl:     PHYSICAL EXAM:  Vitals:    10/09/23 1523   BP: 164/90   Pulse: 80   Resp: 16   Temp: 97.8 °F (36.6 °C)   TempSrc: Temporal   SpO2: 97% Weight: 97.5 kg (215 lb)   Height: 6' 3" (1.905 m)     Body mass index is 26.87 kg/m². Physical Exam  Constitutional:       General: He is not in acute distress. HENT:      Right Ear: External ear normal.   Eyes:      General: No scleral icterus. Conjunctiva/sclera:      Right eye: No hemorrhage. Neck:      Thyroid: No thyromegaly. Vascular: No JVD. Cardiovascular:      Rate and Rhythm: Normal rate. Heart sounds: Murmur heard. Pulmonary:      Effort: Pulmonary effort is normal. No accessory muscle usage or respiratory distress. Breath sounds: No wheezing. Abdominal:      General: There is no distension. Musculoskeletal:      Right ankle: No swelling. Left ankle: No swelling. Skin:     General: Skin is warm. Coloration: Skin is not jaundiced. Neurological:      Mental Status: He is alert. He is not disoriented. Psychiatric:         Speech: He is communicative. Behavior: Behavior is not combative.          LAB RESULTS:  Results for orders placed or performed in visit on 08/28/23   CBC and differential   Result Value Ref Range    WBC 6.63 4.31 - 10.16 Thousand/uL    RBC 4.38 3.88 - 5.62 Million/uL    Hemoglobin 12.5 12.0 - 17.0 g/dL    Hematocrit 38.0 36.5 - 49.3 %    MCV 87 82 - 98 fL    MCH 28.5 26.8 - 34.3 pg    MCHC 32.9 31.4 - 37.4 g/dL    RDW 14.5 11.6 - 15.1 %    MPV 9.9 8.9 - 12.7 fL    Platelets 670 050 - 092 Thousands/uL    nRBC 0 /100 WBCs    Neutrophils Relative 75 43 - 75 %    Immat GRANS % 1 0 - 2 %    Lymphocytes Relative 8 (L) 14 - 44 %    Monocytes Relative 10 4 - 12 %    Eosinophils Relative 4 0 - 6 %    Basophils Relative 2 (H) 0 - 1 %    Neutrophils Absolute 5.02 1.85 - 7.62 Thousands/µL    Immature Grans Absolute 0.07 0.00 - 0.20 Thousand/uL    Lymphocytes Absolute 0.53 (L) 0.60 - 4.47 Thousands/µL    Monocytes Absolute 0.63 0.17 - 1.22 Thousand/µL    Eosinophils Absolute 0.28 0.00 - 0.61 Thousand/µL    Basophils Absolute 0.10 0.00 - 0.10 Thousands/µL   Basic metabolic panel   Result Value Ref Range    Sodium 137 135 - 147 mmol/L    Potassium 4.5 3.5 - 5.3 mmol/L    Chloride 99 96 - 108 mmol/L    CO2 31 21 - 32 mmol/L    ANION GAP 7 mmol/L    BUN 27 (H) 5 - 25 mg/dL    Creatinine 1.26 0.60 - 1.30 mg/dL    Glucose, Fasting 176 (H) 65 - 99 mg/dL    Calcium 10.0 8.4 - 10.2 mg/dL    eGFR 59 ml/min/1.73sq m   Urinalysis with microscopic   Result Value Ref Range    Color, UA Light Yellow     Clarity, UA Clear     Specific Gravity, UA 1.015 1.003 - 1.030    pH, UA 7.0 4.5, 5.0, 5.5, 6.0, 6.5, 7.0, 7.5, 8.0    Leukocytes, UA Negative Negative    Nitrite, UA Negative Negative    Protein,  (2+) (A) Negative mg/dl    Glucose, UA Negative Negative mg/dl    Ketones, UA Negative Negative mg/dl    Urobilinogen, UA <2.0 <2.0 mg/dl mg/dl    Bilirubin, UA Negative Negative    Occult Blood, UA Negative Negative    RBC, UA 1-2 None Seen, 1-2 /hpf    WBC, UA 2-4 (A) None Seen, 1-2 /hpf    Epithelial Cells None Seen None Seen, Occasional /hpf    Bacteria, UA None Seen None Seen, Occasional /hpf   Microalbumin / creatinine urine ratio   Result Value Ref Range    Creatinine, Ur 64.8 Reference range not established. mg/dL    Albumin,U,Random 662.8 (H) <20.0 mg/L    Albumin Creat Ratio 1,023 (H) 0 - 30 mg/g creatinine   Vitamin D 25 hydroxy   Result Value Ref Range    Vit D, 25-Hydroxy 24.8 (L) 30.0 - 100.0 ng/mL       ASSESSMENT and PLAN:  Octavio Reza was seen today for chronic kidney disease and follow-up. Diagnoses and all orders for this visit:    Stage 3a chronic kidney disease ([address])  -     CBC and differential; Future  -     Basic metabolic panel; Future  -     Urinalysis with microscopic; Future  -     Albumin / creatinine urine ratio; Future  -     Phosphorus; Future  -     Uric acid; Future  -     PTH, intact; Future  -     Vitamin D 25 hydroxy; Future  -     Basic metabolic panel;  Future  -     Empagliflozin (Jardiance) 25 MG TABS; Take 1 tablet (25 mg total) by mouth every morning    Hypertensive kidney disease with stage 3 chronic kidney disease, unspecified whether stage 3a or 3b CKD ([address])  -     Basic metabolic panel; Future  -     Urinalysis with microscopic; Future  -     Albumin / creatinine urine ratio; Future    Other proteinuria  -     Basic metabolic panel; Future  -     Urinalysis with microscopic; Future  -     Albumin / creatinine urine ratio; Future  -     Basic metabolic panel; Future  -     Empagliflozin (Jardiance) 25 MG TABS; Take 1 tablet (25 mg total) by mouth every morning    Vitamin D deficiency  -     Vitamin D 25 hydroxy; Future    Type 2 diabetes mellitus with stage 3 chronic kidney disease, unspecified whether long term insulin use, unspecified whether stage 3a or 3b CKD ([facility])  -     Empagliflozin (Jardiance) 25 MG TABS; Take 1 tablet (25 mg total) by mouth every morning    Hypertensive kidney disease with stage 2 chronic kidney disease  -     losartan (COZAAR) 100 MG tablet; Take 1 tablet (100 mg total) by mouth daily      1. CKD stage 3. Multifactorial, suspected due to underlying diabetic nephropathy on top of hypertensive nephrosclerosis with proteinuria. Upon review of old medical records from [facility] chart review, new baseline creatinine seems to be fluctuating around 1.2 and in the interim renal function is remained close to baseline with current creatinine of 1.26 with EGFR of 59. Clinically patient is not in volume overload state and advised patient to drink around 2 L of fluid on daily basis recessing well-hydrated. Patient has underlying significant proteinuria and as mentioned below, will plan to start patient on Jardiance which will be also beneficial for management of underlying chronic kidney disease.     Patient now also have appointment with [facility] weight loss management program in December and encourage patient to lose weight which would be beneficial for management of underlying diabetes and hypertension indirectly helpful for also management of chronic kidney disease. Management of diabetes and hypertension as mentioned below. Plan to avoid NSAIDs and recheck renal function before next visit. 2.  Hypertension in chronic kidney disease . His blood pressure was elevated in hemoglobin but according to patient he has been checking blood pressure on regular basis and his blood pressures under well control at home and for time being plan to monitor hypertension with amlodipine 10 mg p.o. daily, losartan 100 mg p.o. daily, hydrochlorothiazide 25 mg p.o. daily and metoprolol  mg p.o. daily today. Encourage patient to follow low-salt diet. 3. Diabetes type 2 in chronic kidney disease. Goal Hb A1c would be < 7% for underlying chronic kidney disease [last known HbA1c was 6.7%-5/18/2023]. Currently patient is taking metformin. Based on current GFR, okay to take metformin from renal standpoint. Since patient has underlying significant proteinuria, plan to add Jardiance as mentioned below. Defer further management diabetes to PCP. 4. Proteinuria. Multifactorial and suspected due to underlying diabetic nephropathy on top of hypertensive nephrosclerosis. SPEP and UPEP done on 1/31/2023 showed no M spike. Current urine microalbumin to creatinine ratio is 1.02 g. Patient is already taking losartan 100 mg p.o. daily which is already at max dose. In current clinical setting, plan to add Jardiance 25 mg p.o. daily today. Encourage patient to drink plenty of water to avoid intravascular volume depletion after initiation of Jardiance. Plan to check BMP in 1 week to ensure renal function is stable. Recheck proteinuria with the next visit. 5.  Vitamin D deficiency. Current vitamin D level is 24 which is still below the goal but better than before. Currently patient is taking cholecalciferol 5000 units p.o. daily and for time being monitor vitamin D level with current dose of cholecalciferol.     Returns to Nephrology office in 6 months . Future labs ordered. Patient will also go for BMP in 2 weeks after initiation of Jardiance. Patient can be reached at [phone]      Portions of the record may have been created with voice recognition software. Occasional wrong word or "sound a like" substitutions may have occurred due to the inherent limitations of voice recognition software. Read the chart carefully and recognize, using context, where substitutions have occurred. If you have any questions, please contact the dictating provider.

## 2023-10-12 ENCOUNTER — OFFICE VISIT (OUTPATIENT)
Dept: WOUND CARE | Facility: CLINIC | Age: 68
End: 2023-10-12
Payer: MEDICARE

## 2023-10-12 VITALS
SYSTOLIC BLOOD PRESSURE: 141 MMHG | HEART RATE: 84 BPM | TEMPERATURE: 97.2 F | DIASTOLIC BLOOD PRESSURE: 93 MMHG | RESPIRATION RATE: 18 BRPM

## 2023-10-12 DIAGNOSIS — L97.511 DIABETIC ULCER OF OTHER PART OF RIGHT FOOT ASSOCIATED WITH DIABETES MELLITUS DUE TO UNDERLYING CONDITION, LIMITED TO BREAKDOWN OF SKIN (HCC): Primary | ICD-10-CM

## 2023-10-12 DIAGNOSIS — E08.621 DIABETIC ULCER OF OTHER PART OF RIGHT FOOT ASSOCIATED WITH DIABETES MELLITUS DUE TO UNDERLYING CONDITION, LIMITED TO BREAKDOWN OF SKIN (HCC): Primary | ICD-10-CM

## 2023-10-12 PROCEDURE — 11042 DBRDMT SUBQ TIS 1ST 20SQCM/<: CPT | Performed by: PODIATRIST

## 2023-10-12 RX ORDER — LIDOCAINE 40 MG/G
CREAM TOPICAL ONCE
Status: COMPLETED | OUTPATIENT
Start: 2023-10-12 | End: 2023-10-12

## 2023-10-12 RX ADMIN — LIDOCAINE: 40 CREAM TOPICAL at 14:15

## 2023-10-12 NOTE — PATIENT INSTRUCTIONS
Orders Placed This Encounter   Procedures    Wound cleansing and dressings     Right Foot Wound     Wash your hands with soap and water. Remove old dressing, discard into plastic bag and place in trash. Cleanse the wound with Dakins solution 5 to 10 minute soak then remove wet gauze and pat dry prior to applying a clean dressing. Do not use tissue or cotton balls. Do not scrub the wound. Shower: no cant get wet in shower      Apply skin prep to skin surrounding wound  Place felt pad to help offload pressure, cut out hole size bigger than wound     Apply maxsorb ag (silver alginate) to the foot wound. Cover with abd pad  Secure with buffy and tape  Change dressing every other day or sooner if needed for drainage          · Wound off loading      Keep weight and pressure off wound at all times. Wear off loading shoe and pad to help reduce pressure. Felt pad around wound to off load  Use crutches to keep weight off     Standing Status:   Future     Standing Expiration Date:   10/12/2024    Wound off loading     Right Foot  · Wound off loading      Keep weight and pressure off wound at all times. Wear off loading shoe and pad to help reduce pressure.   Felt pad around wound to off load  Use crutches to keep weight off     Standing Status:   Future     Standing Expiration Date:   10/12/2024

## 2023-10-12 NOTE — PROGRESS NOTES
Patient ID: Gerry Traylor is a 80 y.o. male Date of Birth 1955       Chief Complaint   Patient presents with    Follow Up Wound Care Visit     Right foot diabetic ulcer       Allergies:  Lisinopril    Diagnosis:  1. Diabetic ulcer of other part of right foot associated with diabetes mellitus due to underlying condition, limited to breakdown of skin (HCC)  -     lidocaine (LMX) 4 % cream       Diagnosis ICD-10-CM Associated Orders   1. Diabetic ulcer of other part of right foot associated with diabetes mellitus due to underlying condition, limited to breakdown of skin (HCC)  E08.621 lidocaine (LMX) 4 % cream    L97.511            Assessment & Plan:  See wound orders. For this Singh grade 2 ulcer, there is significant improvement in the callus to the per-wound. Patient must continue to stay off the foot and use crutches to off-load. Patient will continue local wound care. Do not get wet in the shower. Eat healthy to keep blood sugar low. Follow up one week. Subjective: The patient had a really good week. He used the crutches and stayed off the foot. He also ate chicken, protein and vegetables.         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[address])    Obesity with body mass index 30 or greater    Type 2 diabetes mellitus (HCC)    Retroperitoneal hematoma    Mesenteric lymphadenopathy    Acute blood loss anemia    Heart murmur    GI bleed    Pleural effusion    Chronic venous hypertension (idiopathic) with ulcer of right lower extremity (HCC)    Rash    FINA (acute kidney injury) ([address])    Stage 2 chronic kidney disease    Hypertensive kidney disease with stage 3 chronic kidney disease ([address])    Other proteinuria    Obesity, morbid (HCC)    Follicular lymphoma grade I of lymph nodes of multiple sites ([address]    Vitamin D deficiency    Stage 3a chronic kidney disease ([facility])    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 ([facility])     Past Medical History:   Diagnosis Date    Chronic kidney disease     Diabetes mellitus ([address])     Follicular lymphoma ([facility])     High cholesterol     Hypertension      Past Surgical History:   Procedure Laterality Date    BUNIONECTOMY Right 11/24/2020    Procedure: RIGHT HAV CORRECTION,;  Surgeon: Bess Monterroso DPM;  [facility];  Service: Podiatry    COLONOSCOPY      INCISION AND DRAINAGE OF WOUND Right 10/05/2016    Procedure: INCISION AND DRAINAGE (I&D) EXTREMITY;  Surgeon: Bess Monterroso DPM;  [facility];  Service:     IR BIOPSY LYMPH NODE  07/08/2021    IR EMBOLIZATION (SPECIFY VESSEL OR SITE)  07/08/2021    IR PORT PLACEMENT  9/1/2022    PILONIDAL CYST EXCISION      NV CORRECTION HAMMERTOE Right 02/19/2019    Procedure: THIRD HAMMER TOE CORRECTION;  Surgeon: Bess Monterroso DPM;  [facility];  Service: Podiatry    NV RMVL MATEO CTR VAD W/SUBQ PORT/ CTR/PRPH INSJ N/A 3/14/2023    Procedure: REMOVAL VENOUS PORT (PORT-A-CATH)IR;  Surgeon: Maira Catalan DO;  [facility];  Service: Interventional Radiology    TOE OSTEOTOMY Right 03/14/2017    Procedure: HAMMERTOE CORRECTION R 2 ;  Surgeon: Bess Monterroso DPM;  [facility];  Service:     TOE OSTEOTOMY Left 11/24/2020    Procedure: LEFT HT CORRECTION TOE;  Surgeon: Bess Monterroso DPM;  [facility];  Service: Podiatry    TONSILLECTOMY  1963     Social History     Socioeconomic History    Marital status: /Civil Union     Spouse name: None    Number of children: None    Years of education: None    Highest education level: None   Occupational History    None   Tobacco Use    Smoking status: Never    Smokeless tobacco: Never    Tobacco comments:     Never smoked but exposed to second hand smoke from birth until 19's   Vaping Use    Vaping Use: Never used   Substance and Sexual Activity    Alcohol use: No    Drug use: No    Sexual activity: Not Currently     Partners: Female     Birth control/protection: Abstinence   Other Topics Concern    None   Social History Narrative    None     Social Determinants of Health     Financial Resource Strain: Not on file   Food Insecurity: Not on file   Transportation Needs: Not on file   Physical Activity: Not on file   Stress: Not on file   Social Connections: Not on file   Intimate Partner Violence: Not on file   Housing Stability: Not on file        Current Outpatient Medications:     allopurinol (ZYLOPRIM) 100 mg tablet, Take 3 tablets (300 mg total) by mouth daily (Patient taking differently: Take 100 mg by mouth 2 (two) times a day), Disp: 30 tablet, Rfl: 1    amLODIPine (NORVASC) 10 mg tablet, take 1 tablet by mouth once daily AT NOON, Disp: , Rfl:     aspirin 81 mg chewable tablet, Chew 81 mg daily, Disp: , Rfl:     Cholecalciferol 125 MCG (5000 UT) TABS, Take 5,000 Units by mouth in the morning, Disp: , Rfl:     Cholecalciferol 50 MCG (2000 UT) TABS, Take 1 tablet (2,000 Units total) by mouth daily (Patient not taking: Reported on 10/9/2023), Disp: , Rfl:     Empagliflozin (Jardiance) 25 MG TABS, Take 1 tablet (25 mg total) by mouth every morning, Disp: 90 tablet, Rfl: 2    hydrochlorothiazide (HYDRODIURIL) 25 mg tablet, Take 25 mg by mouth daily, Disp: , Rfl:     losartan (COZAAR) 100 MG tablet, Take 1 tablet (100 mg total) by mouth daily, Disp: , Rfl:     metFORMIN (GLUCOPHAGE) 500 mg tablet, Take 1,000 mg by mouth 2 (two) times a day with meals, Disp: , Rfl:     metoprolol succinate (TOPROL-XL) 100 mg 24 hr tablet, Take 100 mg by mouth every evening, Disp: , Rfl:     Multiple Vitamins-Minerals (Centrum Silver 50+Men) TABS, , Disp: , Rfl:     simvastatin (ZOCOR) 40 mg tablet, Take 40 mg by mouth daily at bedtime, Disp: , Rfl:   No current facility-administered medications for this visit. Family History   Problem Relation Age of Onset    Cancer Father         Leukemia      Review of Systems   Constitutional:  Negative for chills and fever. Objective:  /93   Pulse 84   Temp (!) 97.2 °F (36.2 °C)   Resp 18     Physical Exam  Neurological:      Mental Status: He is alert. Wound 09/01/22 Incision Chest Anterior;Right (Active)       Wound 03/14/23 Chest Right (Active)       Wound 09/07/23 Diabetic Ulcer Foot Right;Plantar;Posterior (Active)   Enter Singh score: Singh Grade 1: Partial or full-thickness ulcer (superficial) 10/12/23 1408   Wound Image   10/12/23 1411   Wound Description Pink;Yellow 10/12/23 1408   Delisa-wound Assessment Maceration;Callus 10/12/23 1408   Wound Length (cm) 1.5 cm 10/12/23 1408   Wound Width (cm) 2.6 cm 10/12/23 1408   Wound Depth (cm) 0.1 cm 10/12/23 1408   Wound Surface Area (cm^2) 3.9 cm^2 10/12/23 1408   Wound Volume (cm^3) 0.39 cm^3 10/12/23 1408   Calculated Wound Volume (cm^3) 0.39 cm^3 10/12/23 1408   Change in Wound Size % 61 10/12/23 1408   Tunneling in depth located at 1-12 o'clock 10/05/23 1348   Undermining 0.1 10/12/23 1408   Undermining is depth extending from 8 to 9 oclock 10/12/23 1408   Drainage Amount Copious 10/12/23 1408   Drainage Description Serosanguineous 10/12/23 1408   Non-staged Wound Description Full thickness 10/12/23 1408   Dressing Status Intact 10/12/23 1408                         Debridement   Wound 09/07/23 Diabetic Ulcer Foot Right;Plantar;Posterior    Universal Protocol:  Consent: Verbal consent obtained.   Consent given by: patient  Patient understanding: patient states understanding of the procedure being performed  Patient identity confirmed: verbally with patient    Performed by: physician  Debridement type: surgical  Level of debridement: subcutaneous tissue  Pain control: lidocaine 1%  Pre-debridement measurements  Length (cm): 1.5  Width (cm): 2.6  Depth (cm): 0.1  Surface Area (cm^2): 3.9  Volume (cm^3): 0.4    Post-debridement measurements  Length (cm): 1.7  Width (cm): 2.7  Depth (cm): 0.2  Percent debrided: 100%  Surface Area (cm^2): 4.6  Area debrided (cm^2): 4.6  Volume (cm^3): 0.9  Tissue and other material debrided: subcutaneous tissue  Instrument(s) utilized: curette  Bleeding: medium  Hemostasis obtained with: silver nitrate  Procedural pain (0-10): insensate  Post-procedural pain: insensate   Response to treatment: procedure was tolerated well                 Wound Instructions:  No orders of the defined types were placed in this encounter. [address], [place]      Portions of the record may have been created with voice recognition software. Occasional wrong word or "sound a like" substitutions may have occurred due to the inherent limitations of voice recognition software. Read the chart carefully and recognize, using context, where substitutions have occurred.

## 2023-10-19 ENCOUNTER — OFFICE VISIT (OUTPATIENT)
Dept: WOUND CARE | Facility: CLINIC | Age: 68
End: 2023-10-19
Payer: MEDICARE

## 2023-10-19 VITALS
DIASTOLIC BLOOD PRESSURE: 90 MMHG | SYSTOLIC BLOOD PRESSURE: 160 MMHG | HEART RATE: 70 BPM | RESPIRATION RATE: 20 BRPM | TEMPERATURE: 97.8 F

## 2023-10-19 DIAGNOSIS — E08.621 DIABETIC ULCER OF OTHER PART OF RIGHT FOOT ASSOCIATED WITH DIABETES MELLITUS DUE TO UNDERLYING CONDITION, LIMITED TO BREAKDOWN OF SKIN (HCC): Primary | ICD-10-CM

## 2023-10-19 DIAGNOSIS — L97.511 DIABETIC ULCER OF OTHER PART OF RIGHT FOOT ASSOCIATED WITH DIABETES MELLITUS DUE TO UNDERLYING CONDITION, LIMITED TO BREAKDOWN OF SKIN (HCC): Primary | ICD-10-CM

## 2023-10-19 PROCEDURE — 11042 DBRDMT SUBQ TIS 1ST 20SQCM/<: CPT | Performed by: PODIATRIST

## 2023-10-19 RX ORDER — LIDOCAINE 40 MG/G
CREAM TOPICAL ONCE
Status: COMPLETED | OUTPATIENT
Start: 2023-10-19 | End: 2023-10-19

## 2023-10-19 RX ADMIN — LIDOCAINE 1 APPLICATION: 40 CREAM TOPICAL at 14:05

## 2023-10-19 NOTE — PROGRESS NOTES
Patient ID: Lake Smith is a 80 y.o. male Date of Birth 1955       Chief Complaint   Patient presents with    Follow Up Wound Care Visit     Right foot wound       Allergies:  Lisinopril    Diagnosis:  1. Diabetic ulcer of other part of right foot associated with diabetes mellitus due to underlying condition, limited to breakdown of skin (HCC)  -     lidocaine (LMX) 4 % cream  -     Wound cleansing and dressings; Future  -     Wound off loading; Future       Diagnosis ICD-10-CM Associated Orders   1. Diabetic ulcer of other part of right foot associated with diabetes mellitus due to underlying condition, limited to breakdown of skin (HCC)  E08.621 lidocaine (LMX) 4 % cream    L97.511 Wound cleansing and dressings     Wound off loading           Assessment & Plan:  See wound orders. Diabetic ulcer right foot to fat level/rosa 2 ulcer right foot. Patient needs to continue a healthy diet of proteins and vegetables. For the skin irritation medial, I recommend a horse-shoe shaped off-loading pad which can be doubled in thickness. Stay away from padding over the medial border of the ulcer. Continue to limit walking and use crutches at all times. Subjective: The patient had a good week. He used the padding and also crutches to off-load the forefoot. He also ate protein and vegetables.         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[address])    Obesity with body mass index 30 or greater    Type 2 diabetes mellitus (HCC)    Retroperitoneal hematoma    Mesenteric lymphadenopathy    Acute blood loss anemia    Heart murmur    GI bleed    Pleural effusion    Chronic venous hypertension (idiopathic) with ulcer of right lower extremity (HCC)    Rash    FINA (acute kidney injury) ([address])    Stage 2 chronic kidney disease    Hypertensive kidney disease with stage 3 chronic kidney disease ([address])    Other proteinuria    Obesity, morbid (HCC)    Follicular lymphoma grade I of lymph nodes of multiple sites ([address])    Vitamin D deficiency    Stage 3a chronic kidney disease ([address])    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 ([address])     Past Medical History:   Diagnosis Date    Chronic kidney disease     Diabetes mellitus ([address])     Follicular lymphoma ([address])     High cholesterol     Hypertension      Past Surgical History:   Procedure Laterality Date    BUNIONECTOMY Right 11/24/2020    Procedure: RIGHT HAV CORRECTION,;  Surgeon: Prem Vernon DPM;  [facility];  Service: Podiatry    COLONOSCOPY      INCISION AND DRAINAGE OF WOUND Right 10/05/2016    Procedure: INCISION AND DRAINAGE (I&D) EXTREMITY;  Surgeon: Prem Vernon DPM;  [facility];  Service:     IR BIOPSY LYMPH NODE  07/08/2021    IR EMBOLIZATION (SPECIFY VESSEL OR SITE)  07/08/2021    IR PORT PLACEMENT  9/1/2022    PILONIDAL CYST EXCISION      IL CORRECTION HAMMERTOE Right 02/19/2019    Procedure: THIRD HAMMER TOE CORRECTION;  Surgeon: Prem Vernon DPM;  [facility];  Service: Podiatry    IL RMVL MATEO CTR VAD W/SUBQ PORT/ CTR/PRPH INSJ N/A 3/14/2023    Procedure: REMOVAL VENOUS PORT (PORT-A-CATH)IR;  Surgeon: America Crow DO;  [facility];  Service: Interventional Radiology    TOE OSTEOTOMY Right 03/14/2017    Procedure: HAMMERTOE CORRECTION R 2 ;  Surgeon: Prem Vernon DPM;  [facility];  Service:     TOE OSTEOTOMY Left 11/24/2020    Procedure: LEFT HT CORRECTION TOE;  Surgeon: Prem Vernon DPM;  [facility];  Service: Podiatry    TONSILLECTOMY  1963     Social History     Socioeconomic History    Marital status: /Civil Union     Spouse name: None    Number of children: None    Years of education: None    Highest education level: None   Occupational History    None   Tobacco Use    Smoking status: Never    Smokeless tobacco: Never    Tobacco comments:     Never smoked but exposed to second hand smoke from birth until 19's   Vaping Use    Vaping Use: Never used   Substance and Sexual Activity    Alcohol use: No    Drug use: No    Sexual activity: Not Currently     Partners: Female     Birth control/protection: Abstinence   Other Topics Concern    None   Social History Narrative    None     Social Determinants of Health     Financial Resource Strain: Not on file   Food Insecurity: Not on file   Transportation Needs: Not on file   Physical Activity: Not on file   Stress: Not on file   Social Connections: Not on file   Intimate Partner Violence: Not on file   Housing Stability: Not on file        Current Outpatient Medications:     allopurinol (ZYLOPRIM) 100 mg tablet, Take 3 tablets (300 mg total) by mouth daily (Patient taking differently: Take 100 mg by mouth 2 (two) times a day), Disp: 30 tablet, Rfl: 1    amLODIPine (NORVASC) 10 mg tablet, take 1 tablet by mouth once daily AT NOON, Disp: , Rfl:     aspirin 81 mg chewable tablet, Chew 81 mg daily, Disp: , Rfl:     Cholecalciferol 125 MCG (5000 UT) TABS, Take 5,000 Units by mouth in the morning, Disp: , Rfl:     Cholecalciferol 50 MCG (2000 UT) TABS, Take 1 tablet (2,000 Units total) by mouth daily (Patient not taking: Reported on 10/9/2023), Disp: , Rfl:     Empagliflozin (Jardiance) 25 MG TABS, Take 1 tablet (25 mg total) by mouth every morning, Disp: 90 tablet, Rfl: 2    hydrochlorothiazide (HYDRODIURIL) 25 mg tablet, Take 25 mg by mouth daily, Disp: , Rfl:     losartan (COZAAR) 100 MG tablet, Take 1 tablet (100 mg total) by mouth daily, Disp: , Rfl:     metFORMIN (GLUCOPHAGE) 500 mg tablet, Take 1,000 mg by mouth 2 (two) times a day with meals, Disp: , Rfl:     metoprolol succinate (TOPROL-XL) 100 mg 24 hr tablet, Take 100 mg by mouth every evening, Disp: , Rfl:     Multiple Vitamins-Minerals (Centrum Silver 50+Men) TABS, , Disp: , Rfl:     simvastatin (ZOCOR) 40 mg tablet, Take 40 mg by mouth daily at bedtime, Disp: , Rfl:   No current facility-administered medications for this visit. Family History   Problem Relation Age of Onset    Cancer Father         Leukemia      Review of Systems   Constitutional:  Negative for chills and fever. Objective:  /90   Pulse 70   Temp 97.8 °F (36.6 °C)   Resp 20     Physical Exam  Neurological:      Mental Status: He is alert. Medial aspect of the ulcer with callus which is loose with some clear drainage underneath. Wound 09/01/22 Incision Chest Anterior;Right (Active)       Wound 03/14/23 Chest Right (Active)       Wound 09/07/23 Diabetic Ulcer Foot Right;Plantar;Posterior (Active)   Enter Singh score: Singh Grade 1: Partial or full-thickness ulcer (superficial) 10/12/23 1408   Wound Image   10/19/23 1403   Wound Description Pink;Yellow 10/12/23 1408   Delisa-wound Assessment Maceration;Callus 10/12/23 1408   Wound Length (cm) 1.5 cm 10/12/23 1408   Wound Width (cm) 2.6 cm 10/12/23 1408   Wound Depth (cm) 0.1 cm 10/12/23 1408   Wound Surface Area (cm^2) 3.9 cm^2 10/12/23 1408   Wound Volume (cm^3) 0.39 cm^3 10/12/23 1408   Calculated Wound Volume (cm^3) 0.39 cm^3 10/12/23 1408   Change in Wound Size % 61 10/12/23 1408   Tunneling in depth located at 1-12 o'clock 10/05/23 1348   Undermining 0.1 10/12/23 1408   Undermining is depth extending from 8 to 9 oclock 10/12/23 1408   Drainage Amount Copious 10/12/23 1408   Drainage Description Serosanguineous 10/12/23 1408   Non-staged Wound Description Full thickness 10/12/23 1408   Dressing Status Intact 10/12/23 1408                         Debridement   Wound 09/07/23 Diabetic Ulcer Foot Right;Plantar;Posterior    Universal Protocol:  Consent: Verbal consent obtained.   Consent given by: patient  Patient understanding: patient states understanding of the procedure being performed  Patient identity confirmed: verbally with patient    Performed by: physician  Debridement type: surgical  Level of debridement: subcutaneous tissue  Pain control: lidocaine 1%  Pre-debridement measurements  Length (cm): 1.5  Width (cm): 2.6  Depth (cm): 0.1  Surface Area (cm^2): 3.9  Volume (cm^3): 0.4    Post-debridement measurements  Length (cm): 1.8  Width (cm): 3  Depth (cm): 0.2  Percent debrided: 100%  Surface Area (cm^2): 5.4  Area debrided (cm^2): 5.4  Volume (cm^3): 1.1  Tissue and other material debrided: subcutaneous tissue  Devitalized tissue debrided: fibrin and slough  Instrument(s) utilized: curette  Bleeding: medium  Hemostasis obtained with: silver nitrate  Procedural pain (0-10): insensate  Post-procedural pain: insensate   Response to treatment: procedure was tolerated well                 Wound Instructions:  Orders Placed This Encounter   Procedures    Wound cleansing and dressings     Right Foot Wound   Wash your hands with soap and water. Remove old dressing, discard into plastic bag and place in trash. Cleanse the wound with Dakins solution 5 to 10 minute soak then remove wet gauze and pat dry prior to applying a clean dressing. Do not use tissue or cotton balls. Do not scrub the wound. Shower: no cant get wet in shower   Apply skin prep to skin surrounding wound Place felt pad to help offload pressure, cut out hole size bigger than wound   Apply maxsorb ag (silver alginate) to the foot wound. Cover with abd pad Secure with buffy and tape   Change dressing every other day or sooner if needed for drainage     Standing Status:   Future     Standing Expiration Date:   10/19/2024    Wound off loading     Keep weight and pressure off wound at all times. Wear off loading shoe and pad to help reduce pressure.  Felt pad around wound to off load Use crutches to keep weight off     Standing Status:   Future     Standing Expiration Date:   10/19/2024    Debridement This order was created via procedure documentation         Susie Bernabe DPM      Portions of the record may have been created with voice recognition software. Occasional wrong word or "sound a like" substitutions may have occurred due to the inherent limitations of voice recognition software. Read the chart carefully and recognize, using context, where substitutions have occurred.

## 2023-10-19 NOTE — PATIENT INSTRUCTIONS
Orders Placed This Encounter   Procedures    Wound cleansing and dressings     Right Foot Wound   Wash your hands with soap and water. Remove old dressing, discard into plastic bag and place in trash. Cleanse the wound with Dakins solution 5 to 10 minute soak then remove wet gauze and pat dry prior to applying a clean dressing. Do not use tissue or cotton balls. Do not scrub the wound. Shower: no cant get wet in shower   Apply skin prep to skin surrounding wound Place felt pad to help offload pressure, cut out hole size bigger than wound   Apply maxsorb ag (silver alginate) to the foot wound. Cover with abd pad Secure with buffy and tape   Change dressing every other day or sooner if needed for drainage     Standing Status:   Future     Standing Expiration Date:   10/19/2024    Wound off loading     Keep weight and pressure off wound at all times. Wear off loading shoe and pad to help reduce pressure.  Felt pad around wound to off load Use crutches to keep weight off     Standing Status:   Future     Standing Expiration Date:   10/19/2024

## 2023-10-23 NOTE — PROGRESS NOTES
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 Follicular lymphoma of intra-abdominal lymph nodes, unspecified follicular lymphoma type [facility])    Chief Complaint:    Follow up after chemotherapy treatments     Assessment/Plan:   1. Follicular Lymphoma, WHO grade 1-2, initially diagnosed 07/8/2021. Patient was on observation and had no constitutional symptoms. Recently, he developed complaint of chest pain likely related to epigastric pain with burning sensation. He then began having discomfort in the abdomen in other areas. Also began having diarrhea. He had extensive workup from GI which was unrevealing. GI believed he had symptoms related to his follicular lymphoma. As a result, treatment was indicated. Patient started BR treatment Q 28 days on September 6, 2022. 6 cycles given. Restaging PET/CT 2/20/2023 showed no metabolic activity. Discussion of decision making    I personally reviewed the following lab results, the image studies, pathology, other specialty/physicians consult notes and recommendations, and outside medical records from [facility]. I had a lengthy discussion with the patient and shared the work-up findings. I spent 32 minutes reviewing the records (labs, clinician notes, outside records, medical history, ordering medicine/tests/procedures, interpreting the imaging/labs previously done) and coordination of care as well as direct time with the patient today, of which greater than 50% of the time was spent in counseling and coordination of care with the patient/family.     Plan/Labs  Port can be removed and IR referral placed  D/c Allopurinol   Restaging CT CAP w/c in 6 months    Follow Up: 6 months (once a year in person in [facility], virtual otherwise)    All questions were answered to the patient's satisfaction during this encounter. The patient knows the contact information for our office and knows to reach out for any relevant concerns related to this encounter. They are to call for any temperature 100.4 or higher, new symptoms including but not restricted to shaking chills, decreased appetite, nausea, vomiting, diarrhea, increased fatigue, shortness of breath or chest pain, confusion, and not feeling the strength to come to the clinic. For all other listed problems and medical diagnosis in their chart - they are managed by PCP and/or other specialists, which the patient acknowledges. Thank you very much for your consultation and making us a part of this patient's care. We are continuing to follow closely with you. Please do not hesitate to reach out to me with any additional questions or concerns. Jaya Bocanegra MD  Physician, Medical Oncology-Hematology    AJCC 8th Edition Cancer Stage :      Cancer Staging  No matching staging information was found for the patient. Hematology/Oncology History:   - incidentally diagnosed while doing imaging for aortic aneurysm, showed retroperitoneum and mesenteric lymphadenopathy, largest lymph node about 3 cm, status post biopsy showed follicular lymphoma; grade 1-2;   - 7/2021 :  Developed a big hematoma post biopsy. Hospitalized. Hemoglobin dropped to 7.8 then stable. -  8/2021 : Hb recovered. PET showed moderate tumor burden. Does not fit GELF criteria for treatment; FLIPI score = 2, age +  Stage 3. PET CT showed low SUV. Decided to observe.    - Feb 28, 2022 US Head and neck:Patient's lump in the right submandibular region corresponds to an enlarged, heterogeneous submandibular lymph node measuring 1.8 x 1.1 x 1.9 cm (image 18.) Review of the prior PET/CT from 8/10/2021 demonstrate an enlarged, hypermetabolic node in this area, therefore this is likely part of the patient's known follicular lymphoma. Difficult to assess interval change because of the difference in imaging modality  - May 12, 2022, CT CAP w/o c with interval increase in mesenteric lymph nodes, new pulmonary nodules. Conglomerate kennedy mass at the root of the small bowel mesentery on image 82 of series 2 measures approximately 12.0 x 7.5 cm, increased from 8.9 x 6.3 cm when measured using similar technique on July 10, 2021. Discrete upper retroperitoneal nodes are slightly increased from July 10, 2021, specifically an aortocaval node measuring 2.9 x 2.3 cm on image 74 of series 2, increased from 2.2 x 2.0 cm on prior exam and an anterior left periaortic node measuring 2.4 x 2.0 cm on image 76 of series 2 increased from 2.1 x 1.7 cm on prior examination    August 6, 2022, CT abd, pelvis w/o contrast:  8.6 cm low-attenuation focus within the spleen which is not present on CT examination of 5/12/2022. Differential considerations include splenic abscess or progression of patient's follicular lymphoma. This finding can be further evaluated with contrast-enhanced MR abdomen. Progression of upper abdominal lymphadenopathy. Mesenteric and retroperitoneal lymph nodes kennedy burden appears similar to 5/12/2022. New 2.6 cm cystic focus immediately deep to the umbilicus likely to represent additional disease spread. August 8, 2022, patient had an EGD showing erythema in antrum. Small proximal 1 cm sliding hiatal hernia. Otherwise unrevealing findings. Biopsies were benign. August 19, 2022, chronic hepatitis panel was negative. September 6th, 2022 started cycle 1, day 1 of Bendamustine + Rituxan Q28 days and completed 6 cycles  2/20/2023 PET/CT: Retroperitoneal, iliac chain, and inguinal lymphadenopathy as described. However, the lymph nodes are not significantly metabolically active on PET,  with uptake less than the mediastinal blood pool (Deauville score 2).  Large low-attenuation area within the spleen measuring 3.5 x 3.9 cm measures smaller compared to the CT of 11/21/2022 and is not metabolically active on PET  8/21/2023: CT CAP w/c: No evidence of lymphoma in the chest. Previous nodules have resolved. Overall improved lymphadenopathy in the abdomen and pelvis, particularly in the spleen, mesentery and upper retroperitoneum. Pelvic lymphadenopathy is essentially unchanged. New nodular focus along the right seminal vesicle which may represent an enlarged lymph node. This may also be related to lymphoma, though given the overall stability/improvement in lymphadenopathy elsewhere, a distinct etiology, possibly related to the prostate, is not excluded    History of Present Illiness:   Aracelis Victoria is a 80 y.o. male with the above-noted HemOnc history who is here  to follow-up regarding history of follicular lymphoma. Patient has history of GERD, chest pain likely related to GERD. He also has been having decreased appetite causing some weight loss. He still has sensation of upset stomach. GI physicians spoke with my attending who believes his symptoms could be related to his follicular lymphoma. Patient was started on BR treatment Q 28 days September 6, 2022. Interval history   No acute issues, feels very good. No night sweats, fatigue. Denies F/C, N/V, SOB, CP, LH, HA, rash, itching, gen weakness, melena, hematuria, hematochezia, LOC, falls, diarrhea, or constipation      ROS: A 10-point of review of systems is obtained and other than the above is noncontributory. Objective:   VITALS:   /86 (BP Location: Left arm, Patient Position: Sitting, Cuff Size: Standard)   Pulse 77   Temp 97.6 °F (36.4 °C) (Temporal)   Resp 16   Ht 6' 3" (1.905 m)   SpO2 95%   BMI 26.87 kg/m²     Weight today: 215 lbs    Physical EXAM:  General:  Alert, cooperative, no distress, appears stated age. Head:  Normocephalic, without obvious abnormality, atraumatic. Eyes:  Conjunctivae/corneas clear. EOMs intact. No evidence of conjunctivitis     Throat: Lips, mucosa, and tongue normal.  No bleeding from mouth. Neck: Supple, symmetrical, trachea midline    Lungs:    Respiratory effort easy, nonlabored    Heart:  Regular rate and rhythm, +S1, S2 .   Abdomen:   Soft, non-tender,nondistended. Extremities:  Lymphatics: Extremities normal, atraumatic, no cyanosis or edema. No cervical, axillary or inguinal adenopathy   Skin: Skin color, texture, turgor normal. No rashes. Neurologic: AAO.  No focal neuro deficits       Allergies   Allergen Reactions    Lisinopril Cough       Past Medical History:   Diagnosis Date    Chronic kidney disease     Diabetes mellitus ([address] )     Follicular lymphoma ([address] [facility])     High cholesterol     Hypertension        Past Surgical History:   Procedure Laterality Date    BUNIONECTOMY Right 11/24/2020    Procedure: RIGHT HAV CORRECTION,;  Surgeon: Luis Angel Purvis DPM;  [facility];  Service: Podiatry    COLONOSCOPY      INCISION AND DRAINAGE OF WOUND Right 10/05/2016    Procedure: INCISION AND DRAINAGE (I&D) EXTREMITY;  Surgeon: Luis Angel Purvis DPM;  [facility];  Service:     IR BIOPSY LYMPH NODE  07/08/2021    IR EMBOLIZATION (SPECIFY VESSEL OR SITE)  07/08/2021    IR PORT PLACEMENT  9/1/2022    PILONIDAL CYST EXCISION      AK CORRECTION HAMMERTOE Right 02/19/2019    Procedure: THIRD HAMMER TOE CORRECTION;  Surgeon: Luis Angel Purvis DPM;  [facility];  Service: Podiatry    AK RMVL MATEO CTR VAD W/SUBQ PORT/ CTR/PRPH INSJ N/A 3/14/2023    Procedure: REMOVAL VENOUS PORT (PORT-A-CATH)IR;  Surgeon: Idalmis Crowder DO;  [facility];  Service: Interventional Radiology    TOE OSTEOTOMY Right 03/14/2017    Procedure: HAMMERTOE CORRECTION R 2 ;  Surgeon: Luis Angel Purvis DPM;  [facility];  Service:     TOE OSTEOTOMY Left 11/24/2020    Procedure: LEFT HT CORRECTION TOE;  Surgeon: Luis Angel Purvis DPM;  [facility];  Service: Podiatry    TONSILLECTOMY 80       Family History   Problem Relation Age of Onset    Cancer Father         Leukemia       Social History     Socioeconomic History    Marital status: /Civil Union     Spouse name: Not on file    Number of children: Not on file    Years of education: Not on file    Highest education level: Not on file   Occupational History    Not on file   Tobacco Use    Smoking status: Never    Smokeless tobacco: Never    Tobacco comments:     Never smoked but exposed to second hand smoke from birth until 19's   Vaping Use    Vaping Use: Never used   Substance and Sexual Activity    Alcohol use: No    Drug use: No    Sexual activity: Not Currently     Partners: Female     Birth control/protection: Abstinence   Other Topics Concern    Not on file   Social History Narrative    Not on file     Social Determinants of Health     Financial Resource Strain: Not on file   Food Insecurity: Not on file   Transportation Needs: Not on file   Physical Activity: Not on file   Stress: Not on file   Social Connections: Not on file   Intimate Partner Violence: Not on file   Housing Stability: Not on file       Current Outpatient Medications   Medication Sig Dispense Refill    allopurinol (ZYLOPRIM) 300 mg tablet Take 300 mg by mouth daily      amLODIPine (NORVASC) 10 mg tablet take 1 tablet by mouth once daily AT NOON      aspirin 81 mg chewable tablet Chew 81 mg daily      Cholecalciferol 125 MCG (5000 UT) TABS Take 5,000 Units by mouth in the morning      Empagliflozin (Jardiance) 25 MG TABS Take 1 tablet (25 mg total) by mouth every morning 90 tablet 2    hydrochlorothiazide (HYDRODIURIL) 25 mg tablet Take 25 mg by mouth daily      losartan (COZAAR) 100 MG tablet Take 1 tablet (100 mg total) by mouth daily      metFORMIN (GLUCOPHAGE) 500 mg tablet Take 1,000 mg by mouth 2 (two) times a day with meals      metoprolol succinate (TOPROL-XL) 100 mg 24 hr tablet Take 100 mg by mouth every evening      Multiple Vitamins-Minerals (Centrum Silver 50+Men) TABS       simvastatin (ZOCOR) 40 mg tablet Take 40 mg by mouth daily at bedtime      Cholecalciferol 50 MCG (2000 UT) TABS Take 1 tablet (2,000 Units total) by mouth daily (Patient not taking: Reported on 10/9/2023)       No current facility-administered medications for this visit. (Not in a hospital admission)      DATA REVIEW:    Pathology Result:    Final Diagnosis   Date Value Ref Range Status   08/15/2022   Final    A. Stomach, linear antral erythema:  - Gastric antral mucosa with mild chronic active gastritis and regenerative epithelial changes.  - No Helicobacter pylori organisms are identified with immunoperoxidase stain.  - Negative for intestinal metaplasia, dysplasia or carcinoma. B. Esophagus, irregular z line 39:  - Squamocolumnar junction mucosa with mild chronic inflammation and regenerative epithelial changes.  - No intestinal metaplasia/ dysplasia identified. C. Esophagogastric junction, small nodule ge junction:  - Fragments of esophageal squamous and gastric glandular mucosa with intestinal metaplasia and regenerative epithelial changes. - Intestinal metaplasia identified with Alcian blue/PAS stain.  - Pan keratin stain highlights benign epithelial elements.  - No dysplasia identified. See note. Note (C): This biopsy shows gastric-type mucosa with scattered goblet cells. The diagnosis in this case depends on the location of this biopsy. If this biopsy was taken from the tubular esophagus at least 1 cm above the gastric folds, it represents Ozuna mucosa of the distinctive type. If this biopsy was taken from the gastric cardia, it represents intestinal metaplasia of the gastric cardia. Reference: Ana Maria Rosales LB; [employer] [facility]. [facility] Clinical Guideline: Diagnosis and Management of Ozuna's Esophagus. Juliana Rodriguez. 2016 Jan;111(4)39-51.     07/08/2021   Final    A.  Lymph node, periaortic, biopsy:  -  Follicular lymphoma, WHO grade 1-2 (of 3) (see note)       06/28/2021   Final    A. Duodenum:  - Small bowel mucosa with no significant histopathologic abnormality.  - Negative for malabsorption pattern. - Negative for malignancy. B. Stomach:  - Gastric mucosa with no significant histopathologic abnormality.  - Negative for H. pylori by routine H&E.  - Negative for malignancy. C. Esophagus, distal:  - Squamocolumnar mucosa with intestinal metaplasia. See comment.  - Negative for dysplasia and malignancy. Comment: The diagnosis in this case depends on the location of this biopsy. If this biopsy was taken from the tubular esophagus at least 1 cm above the gastric folds, it represents Ozuna mucosa. If this biopsy was taken from the gastric cardia, it represents intestinal metaplasia of the gastric cardia. Image Results: They are reviewed and documented in Hematology/Oncology history    CT chest abdomen pelvis w contrast  Narrative: CT CHEST, ABDOMEN AND PELVIS WITH IV CONTRAST    INDICATION:   X81.99: Follicular lymphoma grade i, lymph nodes of multiple sites. COMPARISON: 11/21/2022 and 8/6/2022    TECHNIQUE: CT examination of the chest, abdomen and pelvis was performed. Multiplanar 2D reformatted images were created from the source data. This examination, like all CT scans performed in the [facility], was performed utilizing techniques to minimize radiation dose exposure, including the use of iterative reconstruction and automated exposure control. Radiation dose length   product (DLP) for this visit:  2075 mGy-cm    IV Contrast:  100 mL of iohexol (OMNIPAQUE)  Enteric Contrast: Enteric contrast was not administered. FINDINGS:    CHEST    LUNGS:  Lungs are clear. Previous nodules have resolved and were either inflammatory or related to lymphoma. There is no tracheal or endobronchial lesion. PLEURA:  Unremarkable.     HEART/GREAT VESSELS: Heart is unremarkable for patient's age. There is fusiform ectasia of the ascending thoracic aorta measuring up to 45 mm, previously 44. Recommendation is for follow-up low radiation dose chest CT in one year. MEDIASTINUM AND GABRIEL:  Unremarkable. CHEST WALL AND LOWER NECK:  Unremarkable. ABDOMEN    LIVER/BILIARY TREE:  Liver is diffusely decreased in density consistent with fatty change. No CT evidence of suspicious hepatic mass. Normal hepatic contours. No biliary dilatation. GALLBLADDER: There are gallstone(s) within the gallbladder, without pericholecystic inflammatory changes. SPLEEN: Hypodense splenic lesion has decreased in size compared with November 2022 measuring 3.1 x 2.9 cm, previously 5.2 x 4.5 cm. PANCREAS:  Unremarkable. ADRENAL GLANDS:  Unremarkable. KIDNEYS/URETERS:  Unremarkable. No hydronephrosis. STOMACH AND BOWEL:  Unremarkable. APPENDIX:  No findings to suggest appendicitis. ABDOMINOPELVIC CAVITY:  No ascites. No pneumoperitoneum. Ill-defined conglomerate lymphadenopathy at the base of the small bowel mesentery is decreased compared to November 2022. Para-aortic and aortocaval lymphadenopathy seen on the prior study has   significantly improved with shotty lymph nodes and no pathologically enlarged lymphadenopathy. Iliac chain lymphadenopathy is stable to slightly increased. There is a 1.7 x 1.8 cm right external iliac lymph node, unchanged when allowing for differences in slice selection. There is a more distal right external iliac lymph node measuring 1.9 x 2.0   cm, unchanged when allowing for differences in slice selection. There is a stable right external iliac/common femoral lymph node measuring 1.3 x 1.5 cm. There is a left common iliac lymph node measuring 1.3 x 1.7 cm, unchanged. There is a left external   iliac/common femoral lymph node measuring 1.3 x 3.1 cm, unchanged.  There is a 1.2 x 1.4 cm left external iliac lymph node, previously 1.1 x 1.4 cm.    There is a new nodule along the right seminal vesicles which likely represents an enlarged lymph node. VESSELS:  Unremarkable for patient's age. PELVIS    REPRODUCTIVE ORGANS:  Unremarkable for patient's age. URINARY BLADDER:  Unremarkable. ABDOMINAL WALL/INGUINAL REGIONS: There are bilateral fat-containing inguinal hernias. OSSEOUS STRUCTURES:  No acute fracture or destructive osseous lesion. Impression: 1. No evidence of lymphoma in the chest. Previous nodules have resolved. 2.  Overall improved lymphadenopathy in the abdomen and pelvis, particularly in the spleen, mesentery and upper retroperitoneum. Pelvic lymphadenopathy is essentially unchanged. 3.  New nodular focus along the right seminal vesicle which may represent an enlarged lymph node. This may also be related to lymphoma, though given the overall stability/improvement in lymphadenopathy elsewhere, a distinct etiology, possibly related to   the prostate, is not excluded. Correlation with PSA is recommended. 4.  4.5 cm ectasia of the ascending aorta, previously 4.4 cm when measured in a similar fashion. 5.  Diffuse hepatic steatosis. Workstation performed: [id number]        LABS:  Lab data are reviewed and documented in HemOnc history. No results found for this or any previous visit (from the past 48 hour(s)).           Naomi Cummings  11/2/2023, 9:05 AM

## 2023-10-26 ENCOUNTER — OFFICE VISIT (OUTPATIENT)
Dept: WOUND CARE | Facility: CLINIC | Age: 68
End: 2023-10-26
Payer: MEDICARE

## 2023-10-26 VITALS
TEMPERATURE: 96.5 F | HEART RATE: 86 BPM | RESPIRATION RATE: 18 BRPM | SYSTOLIC BLOOD PRESSURE: 149 MMHG | DIASTOLIC BLOOD PRESSURE: 75 MMHG

## 2023-10-26 DIAGNOSIS — L97.511 DIABETIC ULCER OF OTHER PART OF RIGHT FOOT ASSOCIATED WITH DIABETES MELLITUS DUE TO UNDERLYING CONDITION, LIMITED TO BREAKDOWN OF SKIN (HCC): Primary | ICD-10-CM

## 2023-10-26 DIAGNOSIS — E08.621 DIABETIC ULCER OF OTHER PART OF RIGHT FOOT ASSOCIATED WITH DIABETES MELLITUS DUE TO UNDERLYING CONDITION, LIMITED TO BREAKDOWN OF SKIN (HCC): Primary | ICD-10-CM

## 2023-10-26 PROCEDURE — 11042 DBRDMT SUBQ TIS 1ST 20SQCM/<: CPT | Performed by: PODIATRIST

## 2023-10-26 NOTE — PATIENT INSTRUCTIONS
Orders Placed This Encounter   Procedures    Wound cleansing and dressings     Right Foot Wound     Wash your hands with soap and water. Remove old dressing, discard into plastic bag and place in trash. Cleanse the wound with Dakins solution 5 to 10 minute soak then remove wet gauze and pat dry prior to applying a clean dressing. Do not use tissue or cotton balls. Do not scrub the wound. Shower: no cant get wet in shower     Apply skin prep to skin surrounding wound Place double felt pad to help offload pressure  Apply maxsorb ag (silver alginate) to the foot wound. Cover with abd pad Secure with buffy and tape   Change dressing every other day or sooner if needed for drainage     Standing Status:   Future     Standing Expiration Date:   10/26/2024    Wound off loading     · Wound off loading      Keep weight and pressure off wound at all times. Wear off loading shoe and pad to help reduce pressure.  Felt pad around wound to off load Use crutches to keep weight off     Standing Status:   Future     Standing Expiration Date:   10/26/2024    Wound miscellaneous orders     Ordered put in vascular test please get ASAP     Standing Status:   Future     Standing Expiration Date:   10/26/2024

## 2023-10-26 NOTE — PROGRESS NOTES
Patient ID: Isiah Ch is a 80 y.o. male Date of Birth 1955       Chief Complaint   Patient presents with    Follow Up Wound Care Visit     Right foot diabetic ulcer       Allergies:  Lisinopril    Diagnosis:  1. Diabetic ulcer of other part of right foot associated with diabetes mellitus due to underlying condition, limited to breakdown of skin ([address])  -     Wound cleansing and dressings; Future  -     Wound off loading; Future  -     Wound miscellaneous orders; Future       Diagnosis ICD-10-CM Associated Orders   1. Diabetic ulcer of other part of right foot associated with diabetes mellitus due to underlying condition, limited to breakdown of skin ([address])  Y09.960 Wound cleansing and dressings    L97.511 Wound off loading     Wound miscellaneous orders           Assessment & Plan:  See wound orders. Will order non-invasive vascular tests to determine OBIE and healing potential.  Patient is also eligible to have his A1c rechecked. He may be a candidate for a biologic to heal.  Stay off the foot with off-loading horse shoe pad and use or crutches. Continue local wound care as ordered. Subjective: The patient had a good week. He will be able to rest even more when his daughter comes back next week. He is still eating vegetables and avoiding pasta.         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[address])    Obesity with body mass index 30 or greater    Type 2 diabetes mellitus (HCC)    Retroperitoneal hematoma    Mesenteric lymphadenopathy    Acute blood loss anemia    Heart murmur    GI bleed    Pleural effusion    Chronic venous hypertension (idiopathic) with ulcer of right lower extremity ([address])    Rash    FINA (acute kidney injury) ([address])    Stage 2 chronic kidney disease    Hypertensive kidney disease with stage 3 chronic kidney disease ([address])    Other proteinuria    Obesity, morbid (HCC)    Follicular lymphoma grade I of lymph nodes of multiple sites ([address])    Vitamin D deficiency    Stage 3a chronic kidney disease ([address])    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 ([address])     Past Medical History:   Diagnosis Date    Chronic kidney disease     Diabetes mellitus ([address])     Follicular lymphoma ([address])     High cholesterol     Hypertension      Past Surgical History:   Procedure Laterality Date    BUNIONECTOMY Right 11/24/2020    Procedure: RIGHT HAV CORRECTION,;  Surgeon: Yenifer Park DPM;  [facility];  Service: Podiatry    COLONOSCOPY      INCISION AND DRAINAGE OF WOUND Right 10/05/2016    Procedure: INCISION AND DRAINAGE (I&D) EXTREMITY;  Surgeon: eYnifer Park DPM;  [facility];  Service:     IR BIOPSY LYMPH NODE  07/08/2021    IR EMBOLIZATION (SPECIFY VESSEL OR SITE)  07/08/2021    IR PORT PLACEMENT  9/1/2022    PILONIDAL CYST EXCISION      ID CORRECTION HAMMERTOE Right 02/19/2019    Procedure: THIRD HAMMER TOE CORRECTION;  Surgeon: Yenifer Park DPM;  [facility];  Service: Podiatry    ID RMVL MATEO CTR VAD W/SUBQ PORT/ CTR/PRPH INSJ N/A 3/14/2023    Procedure: REMOVAL VENOUS PORT (PORT-A-CATH)IR;  Surgeon: Ellen Gunn DO;  [facility];  Service: Interventional Radiology    TOE OSTEOTOMY Right 03/14/2017    Procedure: HAMMERTOE CORRECTION R 2 ;  Surgeon: Yenifer Park DPM;  [facility];  Service:     TOE OSTEOTOMY Left 11/24/2020    Procedure: LEFT HT CORRECTION TOE;  Surgeon: Yenifer Park DPM;  [facility];  Service: Podiatry    TONSILLECTOMY  1963     Social History     Socioeconomic History    Marital status: /Civil Union     Spouse name: None    Number of children: None    Years of education: None    Highest education level: None Occupational History    None   Tobacco Use    Smoking status: Never    Smokeless tobacco: Never    Tobacco comments:     Never smoked but exposed to second hand smoke from birth until 19's   Vaping Use    Vaping Use: Never used   Substance and Sexual Activity    Alcohol use: No    Drug use: No    Sexual activity: Not Currently     Partners: Female     Birth control/protection: Abstinence   Other Topics Concern    None   Social History Narrative    None     Social Determinants of Health     Financial Resource Strain: Not on file   Food Insecurity: Not on file   Transportation Needs: Not on file   Physical Activity: Not on file   Stress: Not on file   Social Connections: Not on file   Intimate Partner Violence: Not on file   Housing Stability: Not on file        Current Outpatient Medications:     allopurinol (ZYLOPRIM) 100 mg tablet, Take 3 tablets (300 mg total) by mouth daily (Patient taking differently: Take 100 mg by mouth 2 (two) times a day), Disp: 30 tablet, Rfl: 1    amLODIPine (NORVASC) 10 mg tablet, take 1 tablet by mouth once daily AT NOON, Disp: , Rfl:     aspirin 81 mg chewable tablet, Chew 81 mg daily, Disp: , Rfl:     Cholecalciferol 125 MCG (5000 UT) TABS, Take 5,000 Units by mouth in the morning, Disp: , Rfl:     Cholecalciferol 50 MCG (2000 UT) TABS, Take 1 tablet (2,000 Units total) by mouth daily (Patient not taking: Reported on 10/9/2023), Disp: , Rfl:     Empagliflozin (Jardiance) 25 MG TABS, Take 1 tablet (25 mg total) by mouth every morning, Disp: 90 tablet, Rfl: 2    hydrochlorothiazide (HYDRODIURIL) 25 mg tablet, Take 25 mg by mouth daily, Disp: , Rfl:     losartan (COZAAR) 100 MG tablet, Take 1 tablet (100 mg total) by mouth daily, Disp: , Rfl:     metFORMIN (GLUCOPHAGE) 500 mg tablet, Take 1,000 mg by mouth 2 (two) times a day with meals, Disp: , Rfl:     metoprolol succinate (TOPROL-XL) 100 mg 24 hr tablet, Take 100 mg by mouth every evening, Disp: , Rfl:     Multiple Vitamins-Minerals (Centrum Silver 50+Men) TABS, , Disp: , Rfl:     simvastatin (ZOCOR) 40 mg tablet, Take 40 mg by mouth daily at bedtime, Disp: , Rfl:   Family History   Problem Relation Age of Onset    Cancer Father         Leukemia      Review of Systems   Constitutional:  Negative for chills and fever. Objective:  /75   Pulse 86   Temp (!) 96.5 °F (35.8 °C)   Resp 18     Physical Exam  Neurological:      Mental Status: He is alert. Wound 09/01/22 Incision Chest Anterior;Right (Active)       Wound 03/14/23 Chest Right (Active)       Wound 09/07/23 Diabetic Ulcer Foot Right;Plantar;Posterior (Active)   Enter Christel Novel score: Christel Novel Grade 1: Partial or full-thickness ulcer (superficial) 10/26/23 1351   Wound Image   10/26/23 1352   Wound Description Pink;Yellow; White 10/26/23 1351   Delisa-wound Assessment Maceration;Callus 10/26/23 1351   Wound Length (cm) 1.2 cm 10/26/23 1351   Wound Width (cm) 2 cm 10/26/23 1351   Wound Depth (cm) 0.2 cm 10/26/23 1351   Wound Surface Area (cm^2) 2.4 cm^2 10/26/23 1351   Wound Volume (cm^3) 0.48 cm^3 10/26/23 1351   Calculated Wound Volume (cm^3) 0.48 cm^3 10/26/23 1351   Change in Wound Size % 52 10/26/23 1351   Tunneling in depth located at 1-12 o'clock 10/05/23 1348   Undermining 0.1 10/12/23 1408   Undermining is depth extending from 8 to 9 oclock 10/12/23 1408   Drainage Amount Moderate 10/26/23 1351   Drainage Description Serosanguineous; Tan 10/26/23 1351   Non-staged Wound Description Full thickness 10/26/23 1351   Dressing Status Intact 10/26/23 1351                         Debridement   Wound 09/07/23 Diabetic Ulcer Foot Right;Plantar;Posterior    Universal Protocol:  Consent: Verbal consent obtained.   Consent given by: patient  Patient understanding: patient states understanding of the procedure being performed  Patient identity confirmed: verbally with patient    Performed by: physician  Debridement type: surgical  Level of debridement: subcutaneous tissue  Pain control: lidocaine 1%  Pre-debridement measurements  Length (cm): 1.2  Width (cm): 2  Depth (cm): 0.2  Surface Area (cm^2): 2.4  Volume (cm^3): 0.5    Post-debridement measurements  Length (cm): 0.2  Width (cm): 2.2  Depth (cm): 0.2  Percent debrided: 100%  Surface Area (cm^2): 0.4  Area debrided (cm^2): 0.4  Volume (cm^3): 0.1  Tissue and other material debrided: subcutaneous tissue  Devitalized tissue debrided: callus  Instrument(s) utilized: curette  Bleeding: medium  Hemostasis obtained with: pressure  Procedural pain (0-10): insensate  Post-procedural pain: insensate   Response to treatment: procedure was tolerated well                 Wound Instructions:  Orders Placed This Encounter   Procedures    Wound cleansing and dressings     Right Foot Wound     Wash your hands with soap and water. Remove old dressing, discard into plastic bag and place in trash. Cleanse the wound with Dakins solution 5 to 10 minute soak then remove wet gauze and pat dry prior to applying a clean dressing. Do not use tissue or cotton balls. Do not scrub the wound. Shower: no cant get wet in shower     Apply skin prep to skin surrounding wound Place double felt pad to help offload pressure  Apply maxsorb ag (silver alginate) to the foot wound. Cover with abd pad Secure with buffy and tape   Change dressing every other day or sooner if needed for drainage     Standing Status:   Future     Standing Expiration Date:   10/26/2024    Wound off loading     · Wound off loading      Keep weight and pressure off wound at all times. Wear off loading shoe and pad to help reduce pressure.  Felt pad around wound to off load Use crutches to keep weight off     Standing Status:   Future     Standing Expiration Date:   10/26/2024    Wound miscellaneous orders     Ordered put in vascular test please get ASAP     Standing Status:   Future     Standing Expiration Date:   10/26/2024         Susie Flanagan DPM      Portions of the record may have been created with voice recognition software. Occasional wrong word or "sound a like" substitutions may have occurred due to the inherent limitations of voice recognition software. Read the chart carefully and recognize, using context, where substitutions have occurred.

## 2023-11-02 ENCOUNTER — OFFICE VISIT (OUTPATIENT)
Dept: WOUND CARE | Facility: CLINIC | Age: 68
End: 2023-11-02
Payer: MEDICARE

## 2023-11-02 ENCOUNTER — OFFICE VISIT (OUTPATIENT)
Dept: HEMATOLOGY ONCOLOGY | Facility: CLINIC | Age: 68
End: 2023-11-02
Payer: MEDICARE

## 2023-11-02 VITALS
HEART RATE: 82 BPM | DIASTOLIC BLOOD PRESSURE: 74 MMHG | TEMPERATURE: 96.9 F | SYSTOLIC BLOOD PRESSURE: 135 MMHG | RESPIRATION RATE: 18 BRPM

## 2023-11-02 VITALS
DIASTOLIC BLOOD PRESSURE: 86 MMHG | HEART RATE: 77 BPM | SYSTOLIC BLOOD PRESSURE: 142 MMHG | OXYGEN SATURATION: 95 % | BODY MASS INDEX: 26.87 KG/M2 | RESPIRATION RATE: 16 BRPM | TEMPERATURE: 97.6 F | HEIGHT: 75 IN

## 2023-11-02 DIAGNOSIS — E08.621 DIABETIC ULCER OF OTHER PART OF RIGHT FOOT ASSOCIATED WITH DIABETES MELLITUS DUE TO UNDERLYING CONDITION, LIMITED TO BREAKDOWN OF SKIN (HCC): Primary | ICD-10-CM

## 2023-11-02 DIAGNOSIS — L97.511 DIABETIC ULCER OF OTHER PART OF RIGHT FOOT ASSOCIATED WITH DIABETES MELLITUS DUE TO UNDERLYING CONDITION, LIMITED TO BREAKDOWN OF SKIN (HCC): Primary | ICD-10-CM

## 2023-11-02 DIAGNOSIS — C82.08 FOLLICULAR LYMPHOMA GRADE I OF LYMPH NODES OF MULTIPLE SITES (HCC): Primary | ICD-10-CM

## 2023-11-02 PROCEDURE — 11042 DBRDMT SUBQ TIS 1ST 20SQCM/<: CPT | Performed by: PODIATRIST

## 2023-11-02 PROCEDURE — 99214 OFFICE O/P EST MOD 30 MIN: CPT | Performed by: INTERNAL MEDICINE

## 2023-11-02 RX ORDER — ALLOPURINOL 300 MG/1
300 TABLET ORAL DAILY
COMMUNITY
Start: 2023-10-16 | End: 2023-11-02

## 2023-11-02 RX ORDER — LIDOCAINE 40 MG/G
CREAM TOPICAL ONCE
Status: COMPLETED | OUTPATIENT
Start: 2023-11-02 | End: 2023-11-02

## 2023-11-02 RX ADMIN — LIDOCAINE 1 APPLICATION: 40 CREAM TOPICAL at 13:41

## 2023-11-02 NOTE — PROGRESS NOTES
Patient ID: Luciana Davey is a 80 y.o. male Date of Birth 1955       Chief Complaint   Patient presents with    Follow Up Wound Care Visit     Right foot wound       Allergies:  Lisinopril    Diagnosis:  1. Diabetic ulcer of other part of right foot associated with diabetes mellitus due to underlying condition, limited to breakdown of skin ([address])  -     Wound cleansing and dressings; Future  -     Wound off loading; Future  -     lidocaine (LMX) 4 % cream       Diagnosis ICD-10-CM Associated Orders   1. Diabetic ulcer of other part of right foot associated with diabetes mellitus due to underlying condition, limited to breakdown of skin ([address])  D99.676 Wound cleansing and dressings    L97.511 Wound off loading     lidocaine (LMX) 4 % cream           Assessment & Plan:  See wound orders. Diabetic ulcer right forefoot. Singh grade 2. The patient must stay off the foot all the time due to his significant ability to make callus around the ulcer. Avoid pasta and bread. Continue protein and vegetables to keep blood sugar low. Have A1c checked. Patient has arterial vascular studies scheduled in November. Patient may be a good candidate for biologic tissue application right foot. Follow up one week. Subjective: The patient notes his daughter is back. [place] walk [place] the airport but otherwise has tried to stay off the foot. He is still eating vegetables and then also eggs for breakfast.  He does not check his blood sugar daily and typically checks more when he thinks it is too high or low.         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[address])    Obesity with body mass index 30 or greater Type 2 diabetes mellitus (HCC)    Retroperitoneal hematoma    Mesenteric lymphadenopathy    Acute blood loss anemia    Heart murmur    GI bleed    Pleural effusion    Chronic venous hypertension (idiopathic) with ulcer of right lower extremity (HCC)    Rash    FINA (acute kidney injury) ([address])    Stage 2 chronic kidney disease    Hypertensive kidney disease with stage 3 chronic kidney disease ([address])    Other proteinuria    Obesity, morbid (HCC)    Follicular lymphoma grade I of lymph nodes of multiple sites ([address])    Vitamin D deficiency    Stage 3a chronic kidney disease ([address])    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 ([address])     Past Medical History:   Diagnosis Date    Chronic kidney disease     Diabetes mellitus ([address])     Follicular lymphoma ([address])     High cholesterol     Hypertension      Past Surgical History:   Procedure Laterality Date    BUNIONECTOMY Right 11/24/2020    Procedure: RIGHT HAV CORRECTION,;  Surgeon: Marcelo White DPM;  [facility];  Service: Podiatry    COLONOSCOPY      INCISION AND DRAINAGE OF WOUND Right 10/05/2016    Procedure: INCISION AND DRAINAGE (I&D) EXTREMITY;  Surgeon: Marcelo White DPM;  [facility];  Service:     IR BIOPSY LYMPH NODE  07/08/2021    IR EMBOLIZATION (SPECIFY VESSEL OR SITE)  07/08/2021    IR PORT PLACEMENT  9/1/2022    PILONIDAL CYST EXCISION      ID CORRECTION HAMMERTOE Right 02/19/2019    Procedure: THIRD HAMMER TOE CORRECTION;  Surgeon: Marcelo White DPM;  [facility];  Service: Podiatry    ID RMVL MATEO CTR VAD W/SUBQ PORT/ CTR/PRPH INSJ N/A 3/14/2023    Procedure: REMOVAL VENOUS PORT (PORT-A-CATH)IR;  Surgeon: Jaylon Schafer DO;  [facility];  Service: Interventional Radiology    TOE OSTEOTOMY Right 03/14/2017    Procedure: HAMMERTOE CORRECTION R 2 ;  Surgeon: Marcelo White DPM;  [facility];  Service:     TOE OSTEOTOMY Left 11/24/2020    Procedure: LEFT HT CORRECTION TOE;  Surgeon: Marcelo White DPM;  [facility]; Service: [facility]     Social History     Socioeconomic History    Marital status: /Civil Union     Spouse name: None    Number of children: None    Years of education: None    Highest education level: None   Occupational History    None   Tobacco Use    Smoking status: Never    Smokeless tobacco: Never    Tobacco comments:     Never smoked but exposed to second hand smoke from birth until 19's   Vaping Use    Vaping Use: Never used   Substance and Sexual Activity    Alcohol use: No    Drug use: No    Sexual activity: Not Currently     Partners: Female     Birth control/protection: Abstinence   Other Topics Concern    None   Social History Narrative    None     Social Determinants of Health     Financial Resource Strain: Not on file   Food Insecurity: Not on file   Transportation Needs: Not on file   Physical Activity: Not on file   Stress: Not on file   Social Connections: Not on file   Intimate Partner Violence: Not on file   Housing Stability: Not on file        Current Outpatient Medications:     amLODIPine (NORVASC) 10 mg tablet, take 1 tablet by mouth once daily AT NOON, Disp: , Rfl:     aspirin 81 mg chewable tablet, Chew 81 mg daily, Disp: , Rfl:     Cholecalciferol 125 MCG (5000 UT) TABS, Take 5,000 Units by mouth in the morning, Disp: , Rfl:     Cholecalciferol 50 MCG (2000 UT) TABS, Take 1 tablet (2,000 Units total) by mouth daily (Patient not taking: Reported on 10/9/2023), Disp: , Rfl:     Empagliflozin (Jardiance) 25 MG TABS, Take 1 tablet (25 mg total) by mouth every morning, Disp: 90 tablet, Rfl: 2    hydrochlorothiazide (HYDRODIURIL) 25 mg tablet, Take 25 mg by mouth daily, Disp: , Rfl:     losartan (COZAAR) 100 MG tablet, Take 1 tablet (100 mg total) by mouth daily, Disp: , Rfl:     metFORMIN (GLUCOPHAGE) 500 mg tablet, Take 1,000 mg by mouth 2 (two) times a day with meals, Disp: , Rfl:     metoprolol succinate (TOPROL-XL) 100 mg 24 hr tablet, Take 100 mg by mouth every evening, Disp: , Rfl:     Multiple Vitamins-Minerals (Centrum Silver 50+Men) TABS, , Disp: , Rfl:     simvastatin (ZOCOR) 40 mg tablet, Take 40 mg by mouth daily at bedtime, Disp: , Rfl:   No current facility-administered medications for this visit. Family History   Problem Relation Age of Onset    Cancer Father         Leukemia      Review of Systems   Constitutional:  Negative for chills and fever. Objective:  /74   Pulse 82   Temp (!) 96.9 °F (36.1 °C)   Resp 18     Physical Exam  Neurological:      Mental Status: He is alert. Wound 09/01/22 Incision Chest Anterior;Right (Active)       Wound 03/14/23 Chest Right (Active)       Wound 09/07/23 Diabetic Ulcer Foot Right;Plantar;Posterior (Active)   Enter Neda Lamer score: Neda Curran Grade 1: Partial or full-thickness ulcer (superficial) 10/26/23 1351   Wound Image   11/02/23 1358   Wound Description Pink;Yellow; White 11/02/23 1337   Delisa-wound Assessment Maceration;Callus 11/02/23 1337   Wound Length (cm) 1.2 cm 11/02/23 1337   Wound Width (cm) 2 cm 11/02/23 1337   Wound Depth (cm) 0.2 cm 11/02/23 1337   Wound Surface Area (cm^2) 2.4 cm^2 11/02/23 1337   Wound Volume (cm^3) 0.48 cm^3 11/02/23 1337   Calculated Wound Volume (cm^3) 0.48 cm^3 11/02/23 1337   Change in Wound Size % 52 11/02/23 1337   Tunneling in depth located at 1-12 o'clock 10/05/23 1348   Undermining 0.1 10/12/23 1408   Undermining is depth extending from 8 to 9 oclock 10/12/23 1408   Drainage Amount Moderate 11/02/23 1337   Drainage Description Serosanguineous; Jensen 11/02/23 1337   Non-staged Wound Description Full thickness 11/02/23 1337   Dressing Status Intact 11/02/23 1337                         Debridement   Wound 09/07/23 Diabetic Ulcer Foot Right;Plantar;Posterior    Universal Protocol:  Consent: Verbal consent obtained.   Consent given by: patient  Patient understanding: patient states understanding of the procedure being performed  Patient identity confirmed: verbally with patient    Performed by: physician  Debridement type: surgical  Level of debridement: subcutaneous tissue  Pain control: lidocaine 1%  Pre-debridement measurements  Length (cm): 1.2  Width (cm): 2  Depth (cm): 0.2  Surface Area (cm^2): 2.4  Volume (cm^3): 0.5    Post-debridement measurements  Length (cm): 1.3  Width (cm): 2.1  Depth (cm): 0.3  Percent debrided: 100%  Surface Area (cm^2): 2.7  Area debrided (cm^2): 2.7  Volume (cm^3): 0.8  Tissue and other material debrided: subcutaneous tissue  Devitalized tissue debrided: callus and slough  Instrument(s) utilized: curette  Bleeding: medium  Hemostasis obtained with: pressure and silver nitrate  Procedural pain (0-10): insensate  Post-procedural pain: insensate   Response to treatment: procedure was tolerated well                 Wound Instructions:  Orders Placed This Encounter   Procedures    Wound cleansing and dressings     Right Foot Wound      Wash your hands with soap and water. Remove old dressing, discard into plastic bag and place in trash. Cleanse the wound with Dakins solution 5 to 10 minute soak then remove wet gauze and pat dry prior to applying a clean dressing. Do not use tissue or cotton balls. Do not scrub the wound. Shower: no cant get wet in shower      Apply skin prep to skin surrounding wound Place double felt pad to help offload pressure  Apply maxsorb ag (silver alginate) to the foot wound. Cover with abd pad Secure with buffy and tape   Change dressing every other day or sooner if needed for drainage    The above was completed today at the wound center     Standing Status:   Future     Standing Expiration Date:   11/2/2024    Wound off loading     Keep weight and pressure off wound at all times. Wear off loading shoe and pad to help reduce pressure.  Felt pad around wound to off load Use crutches to keep weight off     Standing Status:   Future     Standing Expiration Date:   11/2/2024         Susie Winter DPM      Portions of the record may have been created with voice recognition software. Occasional wrong word or "sound a like" substitutions may have occurred due to the inherent limitations of voice recognition software. Read the chart carefully and recognize, using context, where substitutions have occurred.

## 2023-11-02 NOTE — PATIENT INSTRUCTIONS
Orders Placed This Encounter   Procedures    Wound cleansing and dressings     Right Foot Wound      Wash your hands with soap and water. Remove old dressing, discard into plastic bag and place in trash. Cleanse the wound with Dakins solution 5 to 10 minute soak then remove wet gauze and pat dry prior to applying a clean dressing. Do not use tissue or cotton balls. Do not scrub the wound. Shower: no cant get wet in shower      Apply skin prep to skin surrounding wound Place double felt pad to help offload pressure  Apply maxsorb ag (silver alginate) to the foot wound. Cover with abd pad Secure with buffy and tape   Change dressing every other day or sooner if needed for drainage    The above was completed today at the wound center     Standing Status:   Future     Standing Expiration Date:   11/2/2024    Wound off loading     Keep weight and pressure off wound at all times. Wear off loading shoe and pad to help reduce pressure.  Felt pad around wound to off load Use crutches to keep weight off     Standing Status:   Future     Standing Expiration Date:   11/2/2024

## 2023-11-09 ENCOUNTER — OFFICE VISIT (OUTPATIENT)
Dept: WOUND CARE | Facility: CLINIC | Age: 68
End: 2023-11-09
Payer: MEDICARE

## 2023-11-09 DIAGNOSIS — E08.621 DIABETIC ULCER OF OTHER PART OF RIGHT FOOT ASSOCIATED WITH DIABETES MELLITUS DUE TO UNDERLYING CONDITION, LIMITED TO BREAKDOWN OF SKIN (HCC): Primary | ICD-10-CM

## 2023-11-09 DIAGNOSIS — L97.511 DIABETIC ULCER OF OTHER PART OF RIGHT FOOT ASSOCIATED WITH DIABETES MELLITUS DUE TO UNDERLYING CONDITION, LIMITED TO BREAKDOWN OF SKIN (HCC): Primary | ICD-10-CM

## 2023-11-09 PROCEDURE — 11042 DBRDMT SUBQ TIS 1ST 20SQCM/<: CPT | Performed by: PODIATRIST

## 2023-11-09 PROCEDURE — 11045 DBRDMT SUBQ TISS EACH ADDL: CPT | Performed by: PODIATRIST

## 2023-11-09 RX ORDER — LIDOCAINE 40 MG/G
CREAM TOPICAL ONCE
Status: COMPLETED | OUTPATIENT
Start: 2023-11-09 | End: 2023-11-09

## 2023-11-09 RX ADMIN — LIDOCAINE 1 APPLICATION: 40 CREAM TOPICAL at 14:01

## 2023-11-09 NOTE — PATIENT INSTRUCTIONS
Orders Placed This Encounter   Procedures    Wound cleansing and dressings     Right Foot Wound      Wash your hands with soap and water. Remove old dressing, discard into plastic bag and place in trash. Cleanse the wound with Dakins solution 5 to 10 minute soak then remove wet gauze and pat dry prior to applying a clean dressing. Do not use tissue or cotton balls. Do not scrub the wound. Shower: no cant get wet in shower      Apply skin prep to skin surrounding wound Place double felt pad to help offload pressure  Apply maxsorb ag (silver alginate) to the foot wound. Cover with abd pad Secure with buffy and tape   Change dressing every other day or sooner if needed for drainage     The above was completed today at the wound center     Standing Status:   Future     Standing Expiration Date:   11/9/2024    Wound off loading     Keep weight and pressure off wound at all times. Wear off loading shoe and pad to help reduce pressure.  Felt pad around wound to off load Use crutches to keep weight off     Standing Status:   Future     Standing Expiration Date:   11/9/2024

## 2023-11-09 NOTE — PROGRESS NOTES
Patient ID: Lake Smith is a 80 y.o. male Date of Birth 1955       Chief Complaint   Patient presents with    Follow Up Wound Care Visit     Right foot wound       Allergies:  Lisinopril    Diagnosis:  1. Diabetic ulcer of other part of right foot associated with diabetes mellitus due to underlying condition, limited to breakdown of skin ([address])  -     Wound cleansing and dressings; Future  -     Wound off loading; Future  -     lidocaine (LMX) 4 % cream       Diagnosis ICD-10-CM Associated Orders   1. Diabetic ulcer of other part of right foot associated with diabetes mellitus due to underlying condition, limited to breakdown of skin ([address])  H50.435 Wound cleansing and dressings    L97.511 Wound off loading     lidocaine (LMX) 4 % cream           Assessment & Plan:  See wound orders. Singh grade 2 ulcer right foot. Diabetic ulcer right foot to fat layer. Patient will continue to use crutches and a felt U-pad to off-load the site. Circulation testing is scheduled for next week. Continue local wound care. Follow up one week. Subjective: This patient is a 79 yo male with the chief concern of ulcer right foot. He has a good week and states that is some way it seems to "feel better".         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[address])    Obesity with body mass index 30 or greater    Type 2 diabetes mellitus (HCC)    Retroperitoneal hematoma    Mesenteric lymphadenopathy    Acute blood loss anemia    Heart murmur    GI bleed    Pleural effusion    Chronic venous hypertension (idiopathic) with ulcer of right lower extremity (HCC)    Rash    FINA (acute kidney injury) ([address])    Stage 2 chronic kidney disease    Hypertensive kidney disease with stage 3 chronic kidney disease (HCC)    Other proteinuria    Obesity, morbid (HCC)    Follicular lymphoma grade I of lymph nodes of multiple sites ([address])    Vitamin D deficiency    Stage 3a chronic kidney disease ([address])    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 ([address])     Past Medical History:   Diagnosis Date    Chronic kidney disease     Diabetes mellitus ([address])     Follicular lymphoma ([address])     High cholesterol     Hypertension      Past Surgical History:   Procedure Laterality Date    BUNIONECTOMY Right 11/24/2020    Procedure: RIGHT HAV CORRECTION,;  Surgeon: Shaka Nichols DPM;  [facility];  Service: Podiatry    COLONOSCOPY      INCISION AND DRAINAGE OF WOUND Right 10/05/2016    Procedure: INCISION AND DRAINAGE (I&D) EXTREMITY;  Surgeon: Shaka Nichols DPM;  [facility];  Service:     IR BIOPSY LYMPH NODE  07/08/2021    IR EMBOLIZATION (SPECIFY VESSEL OR SITE)  07/08/2021    IR PORT PLACEMENT  9/1/2022    PILONIDAL CYST EXCISION      ND CORRECTION HAMMERTOE Right 02/19/2019    Procedure: THIRD HAMMER TOE CORRECTION;  Surgeon: Shaka Nichols DPM;  [facility];  Service: Podiatry    ND RMVL MATEO CTR VAD W/SUBQ PORT/ CTR/PRPH INSJ N/A 3/14/2023    Procedure: REMOVAL VENOUS PORT (PORT-A-CATH)IR;  Surgeon: Thanh Martínez DO;  [facility];  Service: Interventional Radiology    TOE OSTEOTOMY Right 03/14/2017    Procedure: HAMMERTOE CORRECTION R 2 ;  Surgeon: Shaka Nichols DPM;  [facility];  Service:     TOE OSTEOTOMY Left 11/24/2020    Procedure: LEFT HT CORRECTION TOE;  Surgeon: Shaka Nichols DPM;  [facility];  Service: Podiatry    TONSILLECTOMY  1963     Social History     Socioeconomic History    Marital status: /Civil Union     Spouse name: Not on file    Number of children: Not on file    Years of education: Not on file    Highest education level: Not on file   Occupational History    Not on file Tobacco Use    Smoking status: Never    Smokeless tobacco: Never    Tobacco comments:     Never smoked but exposed to second hand smoke from birth until 19's   Vaping Use    Vaping Use: Never used   Substance and Sexual Activity    Alcohol use: No    Drug use: No    Sexual activity: Not Currently     Partners: Female     Birth control/protection: Abstinence   Other Topics Concern    Not on file   Social History Narrative    Not on file     Social Determinants of Health     Financial Resource Strain: Not on file   Food Insecurity: Not on file   Transportation Needs: Not on file   Physical Activity: Not on file   Stress: Not on file   Social Connections: Not on file   Intimate Partner Violence: Not on file   Housing Stability: Not on file        Current Outpatient Medications:     amLODIPine (NORVASC) 10 mg tablet, take 1 tablet by mouth once daily AT NOON, Disp: , Rfl:     aspirin 81 mg chewable tablet, Chew 81 mg daily, Disp: , Rfl:     Cholecalciferol 125 MCG (5000 UT) TABS, Take 5,000 Units by mouth in the morning, Disp: , Rfl:     Cholecalciferol 50 MCG (2000 UT) TABS, Take 1 tablet (2,000 Units total) by mouth daily (Patient not taking: Reported on 10/9/2023), Disp: , Rfl:     Empagliflozin (Jardiance) 25 MG TABS, Take 1 tablet (25 mg total) by mouth every morning, Disp: 90 tablet, Rfl: 2    hydrochlorothiazide (HYDRODIURIL) 25 mg tablet, Take 25 mg by mouth daily, Disp: , Rfl:     losartan (COZAAR) 100 MG tablet, Take 1 tablet (100 mg total) by mouth daily, Disp: , Rfl:     metFORMIN (GLUCOPHAGE) 500 mg tablet, Take 1,000 mg by mouth 2 (two) times a day with meals, Disp: , Rfl:     metoprolol succinate (TOPROL-XL) 100 mg 24 hr tablet, Take 100 mg by mouth every evening, Disp: , Rfl:     Multiple Vitamins-Minerals (Centrum Silver 50+Men) TABS, , Disp: , Rfl:     simvastatin (ZOCOR) 40 mg tablet, Take 40 mg by mouth daily at bedtime, Disp: , Rfl:   No current facility-administered medications for this visit. Family History   Problem Relation Age of Onset    Cancer Father         Leukemia      Review of Systems   Constitutional:  Negative for chills and fever. Objective: There were no vitals taken for this visit. Physical Exam  Neurological:      Mental Status: He is alert. Wound 09/01/22 Incision Chest Anterior;Right (Active)       Wound 03/14/23 Chest Right (Active)       Wound 09/07/23 Diabetic Ulcer Foot Right;Plantar;Posterior (Active)   Enter Mj Romeo score: Mj Romeo Grade 1: Partial or full-thickness ulcer (superficial) 11/09/23 1353   Wound Image   11/09/23 1402   Wound Description Pink;Yellow; White 11/09/23 1353   Delisa-wound Assessment Maceration;Callus 11/09/23 1353   Wound Length (cm) 1 cm 11/09/23 1353   Wound Width (cm) 2 cm 11/09/23 1353   Wound Depth (cm) 0.1 cm 11/09/23 1353   Wound Surface Area (cm^2) 2 cm^2 11/09/23 1353   Wound Volume (cm^3) 0.2 cm^3 11/09/23 1353   Calculated Wound Volume (cm^3) 0.2 cm^3 11/09/23 1353   Change in Wound Size % 80 11/09/23 1353   Tunneling in depth located at 1-12 o'clock 10/05/23 1348   Undermining 0.1 10/12/23 1408   Undermining is depth extending from 8 to 9 oclock 10/12/23 1408   Drainage Amount Moderate 11/09/23 1353   Drainage Description Serosanguineous; Jensen 11/09/23 1353   Non-staged Wound Description Full thickness 11/09/23 1353   Dressing Status Intact 11/09/23 1353                         Debridement   Wound 09/07/23 Diabetic Ulcer Foot Right;Plantar;Posterior    Universal Protocol:  Consent: Verbal consent obtained.   Consent given by: patient  Patient understanding: patient states understanding of the procedure being performed  Patient identity confirmed: verbally with patient    Performed by: physician  Debridement type: surgical  Level of debridement: subcutaneous tissue  Pain control: lidocaine 1%  Post-debridement measurements  Length (cm): 1.1  Width (cm): 2  Depth (cm): 0.2  Percent debrided: 100%  Surface Area (cm^2): 2.2  Area debrided (cm^2): 2.2  Volume (cm^3): 0.4  Tissue and other material debrided: subcutaneous tissue  Devitalized tissue debrided: slough  Instrument(s) utilized: curette  Hemostasis obtained with: pressure and silver nitrate  Procedural pain (0-10): insensate  Post-procedural pain: insensate   Response to treatment: procedure was tolerated well                 Wound Instructions:  Orders Placed This Encounter   Procedures    Wound cleansing and dressings     Right Foot Wound      Wash your hands with soap and water. Remove old dressing, discard into plastic bag and place in trash. Cleanse the wound with Dakins solution 5 to 10 minute soak then remove wet gauze and pat dry prior to applying a clean dressing. Do not use tissue or cotton balls. Do not scrub the wound. Shower: no cant get wet in shower      Apply skin prep to skin surrounding wound Place double felt pad to help offload pressure  Apply maxsorb ag (silver alginate) to the foot wound. Cover with abd pad Secure with buffy and tape   Change dressing every other day or sooner if needed for drainage     The above was completed today at the wound center     Standing Status:   Future     Standing Expiration Date:   11/9/2024    Wound off loading     Keep weight and pressure off wound at all times. Wear off loading shoe and pad to help reduce pressure. Felt pad around wound to off load Use crutches to keep weight off     Standing Status:   Future     Standing Expiration Date:   11/9/2024         Susie Kerns DPM      Portions of the record may have been created with voice recognition software. Occasional wrong word or "sound a like" substitutions may have occurred due to the inherent limitations of voice recognition software. Read the chart carefully and recognize, using context, where substitutions have occurred.

## 2023-11-15 ENCOUNTER — HOSPITAL ENCOUNTER (OUTPATIENT)
Dept: NON INVASIVE DIAGNOSTICS | Facility: HOSPITAL | Age: 68
Discharge: HOME | End: 2023-11-15
Payer: MEDICARE

## 2023-11-15 DIAGNOSIS — L97.511 DIABETIC ULCER OF OTHER PART OF RIGHT FOOT ASSOCIATED WITH DIABETES MELLITUS DUE TO UNDERLYING CONDITION, LIMITED TO BREAKDOWN OF SKIN (HCC): ICD-10-CM

## 2023-11-15 DIAGNOSIS — E08.621 DIABETIC ULCER OF OTHER PART OF RIGHT FOOT ASSOCIATED WITH DIABETES MELLITUS DUE TO UNDERLYING CONDITION, LIMITED TO BREAKDOWN OF SKIN (HCC): ICD-10-CM

## 2023-11-15 PROCEDURE — 93925 LOWER EXTREMITY STUDY: CPT

## 2023-11-15 PROCEDURE — 93923 UPR/LXTR ART STDY 3+ LVLS: CPT

## 2023-11-15 PROCEDURE — 93922 UPR/L XTREMITY ART 2 LEVELS: CPT | Performed by: SURGERY

## 2023-11-15 PROCEDURE — 93925 LOWER EXTREMITY STUDY: CPT | Performed by: SURGERY

## 2023-11-16 ENCOUNTER — OFFICE VISIT (OUTPATIENT)
Dept: WOUND CARE | Facility: CLINIC | Age: 68
End: 2023-11-16
Payer: MEDICARE

## 2023-11-16 VITALS
HEART RATE: 76 BPM | SYSTOLIC BLOOD PRESSURE: 125 MMHG | RESPIRATION RATE: 18 BRPM | TEMPERATURE: 97.6 F | DIASTOLIC BLOOD PRESSURE: 67 MMHG

## 2023-11-16 DIAGNOSIS — E08.621 DIABETIC ULCER OF OTHER PART OF RIGHT FOOT ASSOCIATED WITH DIABETES MELLITUS DUE TO UNDERLYING CONDITION, LIMITED TO BREAKDOWN OF SKIN (HCC): Primary | ICD-10-CM

## 2023-11-16 DIAGNOSIS — L97.511 DIABETIC ULCER OF OTHER PART OF RIGHT FOOT ASSOCIATED WITH DIABETES MELLITUS DUE TO UNDERLYING CONDITION, LIMITED TO BREAKDOWN OF SKIN (HCC): Primary | ICD-10-CM

## 2023-11-16 PROCEDURE — 11042 DBRDMT SUBQ TIS 1ST 20SQCM/<: CPT | Performed by: PODIATRIST

## 2023-11-16 PROCEDURE — 11045 DBRDMT SUBQ TISS EACH ADDL: CPT | Performed by: PODIATRIST

## 2023-11-16 RX ORDER — LIDOCAINE 40 MG/G
CREAM TOPICAL ONCE
Status: COMPLETED | OUTPATIENT
Start: 2023-11-16 | End: 2023-11-16

## 2023-11-16 RX ADMIN — LIDOCAINE 1 APPLICATION: 40 CREAM TOPICAL at 14:18

## 2023-11-16 NOTE — PROGRESS NOTES
Patient ID: Aracelis Victoria is a 80 y.o. male Date of Birth 1955       Chief Complaint   Patient presents with    Follow Up Wound Care Visit     Rt foot wound       Allergies:  Lisinopril    Diagnosis:  1. Diabetic ulcer of other part of right foot associated with diabetes mellitus due to underlying condition, limited to breakdown of skin ([address])  -     Wound cleansing and dressings; Future  -     Wound off loading; Future  -     lidocaine (LMX) 4 % cream       Diagnosis ICD-10-CM Associated Orders   1. Diabetic ulcer of other part of right foot associated with diabetes mellitus due to underlying condition, limited to breakdown of skin ([address])  B71.558 Wound cleansing and dressings    L97.511 Wound off loading     lidocaine (LMX) 4 % cream           Assessment & Plan:  See wound orders. Patient will stay off the foot with his crutches. Eat healthy and keep blood sugar low. He will go for his blood work tomorrow to check A1c. Blood flow is excellent to the right foot. Subjective: The patient is doing well. BS was 180. It may be due to a missed night dosage of medication this week. He is staying off the foot.         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[address])    Obesity with body mass index 30 or greater    Type 2 diabetes mellitus (HCC)    Retroperitoneal hematoma    Mesenteric lymphadenopathy    Acute blood loss anemia    Heart murmur    GI bleed    Pleural effusion    Chronic venous hypertension (idiopathic) with ulcer of right lower extremity (HCC)    Rash    FINA (acute kidney injury) ([address])    Stage 2 chronic kidney disease    Hypertensive kidney disease with stage 3 chronic kidney disease ([address]    Other proteinuria    Obesity, morbid ([facility])    Follicular lymphoma grade I of lymph nodes of multiple sites ([address] [facility])    Vitamin D deficiency    Stage 3a chronic kidney disease ([address] [facility])    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 ([address] [facility])     Past Medical History:   Diagnosis Date    Chronic kidney disease     Diabetes mellitus ([address])     Follicular lymphoma ([address] [facility])     High cholesterol     Hypertension      Past Surgical History:   Procedure Laterality Date    BUNIONECTOMY Right 11/24/2020    Procedure: RIGHT HAV CORRECTION,;  Surgeon: Baljinder Pena DPM;  [facility];  Service: Podiatry    COLONOSCOPY      INCISION AND DRAINAGE OF WOUND Right 10/05/2016    Procedure: INCISION AND DRAINAGE (I&D) EXTREMITY;  Surgeon: Baljinder Pena DPM;  [facility];  Service:     IR BIOPSY LYMPH NODE  07/08/2021    IR EMBOLIZATION (SPECIFY VESSEL OR SITE)  07/08/2021    IR PORT PLACEMENT  9/1/2022    PILONIDAL CYST EXCISION      ND CORRECTION HAMMERTOE Right 02/19/2019    Procedure: THIRD HAMMER TOE CORRECTION;  Surgeon: Baljinder Pena DPM;  [facility];  Service: Podiatry    ND RMVL MATEO CTR VAD W/SUBQ PORT/ CTR/PRPH INSJ N/A 3/14/2023    Procedure: REMOVAL VENOUS PORT (PORT-A-CATH)IR;  Surgeon: Elmer Rodriguez DO;  [facility];  Service: Interventional Radiology    TOE OSTEOTOMY Right 03/14/2017    Procedure: HAMMERTOE CORRECTION R 2 ;  Surgeon: Baljinder Pena DPM;  [facility];  Service:     TOE OSTEOTOMY Left 11/24/2020    Procedure: LEFT HT CORRECTION TOE;  Surgeon: Baljinder Pena DPM;  [facility];  Service: Podiatry    TONSILLECTOMY  1963     Social History     Socioeconomic History    Marital status: /Civil Union     Spouse name: Not on file    Number of children: Not on file    Years of education: Not on file    Highest education level: Not on file   Occupational History    Not on file   Tobacco Use    Smoking status: Never    Smokeless tobacco: Never    Tobacco comments: Never smoked but exposed to second hand smoke from birth until 19's   Vaping Use    Vaping Use: Never used   Substance and Sexual Activity    Alcohol use: No    Drug use: No    Sexual activity: Not Currently     Partners: Female     Birth control/protection: Abstinence   Other Topics Concern    Not on file   Social History Narrative    Not on file     Social Determinants of Health     Financial Resource Strain: Not on file   Food Insecurity: Not on file   Transportation Needs: Not on file   Physical Activity: Not on file   Stress: Not on file   Social Connections: Not on file   Intimate Partner Violence: Not on file   Housing Stability: Not on file        Current Outpatient Medications:     amLODIPine (NORVASC) 10 mg tablet, take 1 tablet by mouth once daily AT NOON, Disp: , Rfl:     aspirin 81 mg chewable tablet, Chew 81 mg daily, Disp: , Rfl:     Cholecalciferol 125 MCG (5000 UT) TABS, Take 5,000 Units by mouth in the morning, Disp: , Rfl:     Cholecalciferol 50 MCG (2000 UT) TABS, Take 1 tablet (2,000 Units total) by mouth daily (Patient not taking: Reported on 10/9/2023), Disp: , Rfl:     Empagliflozin (Jardiance) 25 MG TABS, Take 1 tablet (25 mg total) by mouth every morning, Disp: 90 tablet, Rfl: 2    hydrochlorothiazide (HYDRODIURIL) 25 mg tablet, Take 25 mg by mouth daily, Disp: , Rfl:     losartan (COZAAR) 100 MG tablet, Take 1 tablet (100 mg total) by mouth daily, Disp: , Rfl:     metFORMIN (GLUCOPHAGE) 500 mg tablet, Take 1,000 mg by mouth 2 (two) times a day with meals, Disp: , Rfl:     metoprolol succinate (TOPROL-XL) 100 mg 24 hr tablet, Take 100 mg by mouth every evening, Disp: , Rfl:     Multiple Vitamins-Minerals (Centrum Silver 50+Men) TABS, , Disp: , Rfl:     simvastatin (ZOCOR) 40 mg tablet, Take 40 mg by mouth daily at bedtime, Disp: , Rfl:   No current facility-administered medications for this visit.   Family History   Problem Relation Age of Onset    Cancer Father         Leukemia Review of Systems   Constitutional:  Negative for chills and fever. Objective:  /67   Pulse 76   Temp 97.6 °F (36.4 °C)   Resp 18     Physical Exam  Neurological:      Mental Status: He is alert. Narrative & Impression      [facility] REPORT  CLINICAL:  Indications:  Patient presents with a slow healing ulcer on his right foot which started 6  months ago. Patient denies any pain with walking. Operative History:  2023-03-04 IR port removal  2022-09-01 IR port placement  Right EVLT  Risk Factors  The patient has history of HTN, Diabetes (NIDDM (oral meds)), Hyperlipidemia  and CKD. Clinical  Right Pressure:  133/ mm Hg, Left Pressure:  126/ mm Hg. FINDINGS:     Right                  Impression  Waveform   PSV (cm/s)  EDV  AP (cm)    Post. Tibial                       Triphasic                              Ant. Tibial                        Triphasic                              Common Femoral Artery                                 65    8             Prox Profunda                                         46    7             Prox SFA               Ectatic                        88   13      1.0    Mid SFA                Ectatic                        87   10      1.1    Dist SFA                                              81    8             Proximal Pop           Ectatic                        59   14      1.2    Distal Pop                                            71    4             Tibioperoneal                                         89                  Dist Post Tibial                                      83   15             Prox. Ant. Tibial                                     46    4             Dist. Ant.  Tibial                                     75   10             Dist Peroneal                                         94   15                Left                   Impression      Waveform   PSV (cm/s)  EDV  AP (cm)    Post. Tibial                           Triphasic Ant. Tibial                            Triphasic                              Common Femoral Artery                                     77    0             Prox Profunda                                             45    1             Prox SFA               Ectatic                            94    0      1.0    Mid SFA                Ectatic                            79    0      1.0    Dist SFA                                                  62    0             Proximal Pop           Ectatic                            63    8      1.0    Distal Pop                                                61    0             Tibioperoneal                                             42                  Prox Post Tibial       Not visualized                                         Dist Post Tibial                                          57    0             Dist. Ant. Tibial                                         69    0             Dist Peroneal                                             56    0                      CONCLUSION:     Impression:  RIGHT LOWER LIMB:  No evidence of significant lower extremity arterial occlusive disease. Noted  diffusely ectatic superficial femoral and popliteal arteries. Ankle/Brachial index:  Unreliable due to non-compressible vessels. PVR/ PPG tracings are normal.  Metatarsal pressure of  >240 mm Hg  Great toe pressure of  121 mm Hg, within the healing range     LEFT LOWER LIMB:  No evidence of significant lower extremity arterial occlusive disease. Noted  diffusely ectatic superficial femoral and popliteal arteries. Ankle/Brachial index:  1.19  which is in the normal category. PVR/ PPG tracings are dampened. Metatarsal pressure of  134 mm Hg  Great toe pressure of  124 mm Hg, within the healing range. There is no previous study for comparison.      SIGNATURE:  Electronically Signed by: Blake Franklin MD on 2023-11-15 04:35:04 PM         Wound 09/01/22 Incision Chest Anterior;Right (Active)       Wound 03/14/23 Chest Right (Active)       Wound 09/07/23 Diabetic Ulcer Foot Right;Plantar;Posterior (Active)   Enter Zacarias Perez score: Zacarias Perez Grade 1: Partial or full-thickness ulcer (superficial) 11/09/23 1353   Wound Image   11/16/23 1425   Wound Description Pink;Yellow; White 11/16/23 1411   Delisa-wound Assessment Maceration;Callus 11/16/23 1411   Wound Length (cm) 1 cm 11/16/23 1411   Wound Width (cm) 1.8 cm 11/16/23 1411   Wound Depth (cm) 0.1 cm 11/16/23 1411   Wound Surface Area (cm^2) 1.8 cm^2 11/16/23 1411   Wound Volume (cm^3) 0.18 cm^3 11/16/23 1411   Calculated Wound Volume (cm^3) 0.18 cm^3 11/16/23 1411   Change in Wound Size % 82 11/16/23 1411   Tunneling in depth located at 1-12 o'clock 10/05/23 1348   Undermining 0.1 10/12/23 1408   Undermining is depth extending from 8 to 9 oclock 10/12/23 1408   Drainage Amount Moderate 11/16/23 1411   Drainage Description Serosanguineous; Foul smelling 11/16/23 1411   Non-staged Wound Description Full thickness 11/09/23 1353   Dressing Status Intact 11/16/23 1411                         Debridement   Wound 09/07/23 Diabetic Ulcer Foot Right;Plantar;Posterior    Universal Protocol:  Consent: Verbal consent obtained. Patient understanding: patient states understanding of the procedure being performed  Patient identity confirmed: verbally with patient    Performed by: physician  Debridement type: surgical  Level of debridement: subcutaneous tissue  Pain control: lidocaine 1%  Post-debridement measurements  Length (cm): 1  Width (cm): 2  Depth (cm): 0.2  Percent debrided: 100%  Surface Area (cm^2): 2  Area debrided (cm^2):  2  Volume (cm^3): 0.4  Tissue and other material debrided: dermis, epidermis and subcutaneous tissue  Devitalized tissue debrided: slough  Instrument(s) utilized: curette  Bleeding: small  Hemostasis obtained with: silver nitrate  Procedural pain (0-10): insensate  Post-procedural pain: insensate   Response to treatment: procedure was tolerated well                 Wound Instructions:  Orders Placed This Encounter   Procedures    Wound cleansing and dressings     Right Foot Wound      Wash your hands with soap and water. Remove old dressing, discard into plastic bag and place in trash. Cleanse the wound with Dakins solution 5 to 10 minute soak then remove wet gauze and pat dry prior to applying a clean dressing. Do not use tissue or cotton balls. Do not scrub the wound. Shower: no cant get wet in shower      Apply skin prep to skin surrounding wound Place double felt pad to help offload pressure  Apply maxsorb ag (silver alginate) to the foot wound. Cover with abd pad Secure with buffy and tape   Change dressing every other day or sooner if needed for drainage     The above was completed today at the wound center     Standing Status:   Future     Standing Expiration Date:   11/16/2024    Wound off loading     Keep weight and pressure off wound at all times. Wear off loading shoe and pad to help reduce pressure. Felt pad around wound to off load Use crutches to keep weight off     Standing Status:   Future     Standing Expiration Date:   11/16/2024         Susie Enamorado, DPM      Portions of the record may have been created with voice recognition software. Occasional wrong word or "sound a like" substitutions may have occurred due to the inherent limitations of voice recognition software. Read the chart carefully and recognize, using context, where substitutions have occurred.

## 2023-11-16 NOTE — PATIENT INSTRUCTIONS
Orders Placed This Encounter   Procedures    Wound cleansing and dressings     Right Foot Wound      Wash your hands with soap and water. Remove old dressing, discard into plastic bag and place in trash. Cleanse the wound with Dakins solution 5 to 10 minute soak then remove wet gauze and pat dry prior to applying a clean dressing. Do not use tissue or cotton balls. Do not scrub the wound. Shower: no cant get wet in shower      Apply skin prep to skin surrounding wound Place double felt pad to help offload pressure  Apply maxsorb ag (silver alginate) to the foot wound. Cover with abd pad Secure with buffy and tape   Change dressing every other day or sooner if needed for drainage     The above was completed today at the wound center     Standing Status:   Future     Standing Expiration Date:   11/16/2024    Wound off loading     Keep weight and pressure off wound at all times. Wear off loading shoe and pad to help reduce pressure.  Felt pad around wound to off load Use crutches to keep weight off     Standing Status:   Future     Standing Expiration Date:   11/16/2024

## 2023-11-20 ENCOUNTER — APPOINTMENT (OUTPATIENT)
Dept: LAB | Facility: HOSPITAL | Age: 68
End: 2023-11-20
Payer: MEDICARE

## 2023-11-20 DIAGNOSIS — E11.9 DIABETES MELLITUS WITH NO COMPLICATION (HCC): ICD-10-CM

## 2023-11-20 DIAGNOSIS — E83.52 HYPERCALCEMIA: ICD-10-CM

## 2023-11-20 LAB
ALBUMIN SERPL BCP-MCNC: 4.8 G/DL (ref 3.5–5)
ALP SERPL-CCNC: 55 U/L (ref 34–104)
ALT SERPL W P-5'-P-CCNC: 41 U/L (ref 7–52)
ANION GAP SERPL CALCULATED.3IONS-SCNC: 9 MMOL/L
AST SERPL W P-5'-P-CCNC: 34 U/L (ref 13–39)
BASOPHILS # BLD AUTO: 0.08 THOUSANDS/ÂΜL (ref 0–0.1)
BASOPHILS NFR BLD AUTO: 1 % (ref 0–1)
BILIRUB SERPL-MCNC: 0.43 MG/DL (ref 0.2–1)
BUN SERPL-MCNC: 28 MG/DL (ref 5–25)
CA-I BLD-SCNC: 1.24 MMOL/L (ref 1.12–1.32)
CALCIUM SERPL-MCNC: 10.3 MG/DL (ref 8.4–10.2)
CHLORIDE SERPL-SCNC: 98 MMOL/L (ref 96–108)
CHOLEST SERPL-MCNC: 142 MG/DL
CO2 SERPL-SCNC: 30 MMOL/L (ref 21–32)
CREAT SERPL-MCNC: 1.21 MG/DL (ref 0.6–1.3)
EOSINOPHIL # BLD AUTO: 0.27 THOUSAND/ÂΜL (ref 0–0.61)
EOSINOPHIL NFR BLD AUTO: 4 % (ref 0–6)
ERYTHROCYTE [DISTWIDTH] IN BLOOD BY AUTOMATED COUNT: 15.1 % (ref 11.6–15.1)
EST. AVERAGE GLUCOSE BLD GHB EST-MCNC: 194 MG/DL
GFR SERPL CREATININE-BSD FRML MDRD: 61 ML/MIN/1.73SQ M
GLUCOSE P FAST SERPL-MCNC: 151 MG/DL (ref 65–99)
HBA1C MFR BLD: 8.4 %
HCT VFR BLD AUTO: 40.9 % (ref 36.5–49.3)
HDLC SERPL-MCNC: 29 MG/DL
HGB BLD-MCNC: 13.1 G/DL (ref 12–17)
IMM GRANULOCYTES # BLD AUTO: 0.04 THOUSAND/UL (ref 0–0.2)
IMM GRANULOCYTES NFR BLD AUTO: 1 % (ref 0–2)
LYMPHOCYTES # BLD AUTO: 0.67 THOUSANDS/ÂΜL (ref 0.6–4.47)
LYMPHOCYTES NFR BLD AUTO: 10 % (ref 14–44)
MCH RBC QN AUTO: 28 PG (ref 26.8–34.3)
MCHC RBC AUTO-ENTMCNC: 32 G/DL (ref 31.4–37.4)
MCV RBC AUTO: 87 FL (ref 82–98)
MONOCYTES # BLD AUTO: 0.62 THOUSAND/ÂΜL (ref 0.17–1.22)
MONOCYTES NFR BLD AUTO: 10 % (ref 4–12)
NEUTROPHILS # BLD AUTO: 4.74 THOUSANDS/ÂΜL (ref 1.85–7.62)
NEUTS SEG NFR BLD AUTO: 74 % (ref 43–75)
NONHDLC SERPL-MCNC: 113 MG/DL
NRBC BLD AUTO-RTO: 0 /100 WBCS
PLATELET # BLD AUTO: 180 THOUSANDS/UL (ref 149–390)
PMV BLD AUTO: 10.2 FL (ref 8.9–12.7)
POTASSIUM SERPL-SCNC: 4.6 MMOL/L (ref 3.5–5.3)
PROT SERPL-MCNC: 7.6 G/DL (ref 6.4–8.4)
PTH-INTACT SERPL-MCNC: 51.7 PG/ML (ref 12–88)
RBC # BLD AUTO: 4.68 MILLION/UL (ref 3.88–5.62)
SODIUM SERPL-SCNC: 137 MMOL/L (ref 135–147)
TRIGL SERPL-MCNC: 590 MG/DL
URATE SERPL-MCNC: 4.7 MG/DL (ref 3.5–8.5)
WBC # BLD AUTO: 6.42 THOUSAND/UL (ref 4.31–10.16)

## 2023-11-20 PROCEDURE — 84550 ASSAY OF BLOOD/URIC ACID: CPT

## 2023-11-20 PROCEDURE — 36415 COLL VENOUS BLD VENIPUNCTURE: CPT

## 2023-11-20 PROCEDURE — 83036 HEMOGLOBIN GLYCOSYLATED A1C: CPT

## 2023-11-20 PROCEDURE — 82330 ASSAY OF CALCIUM: CPT

## 2023-11-20 PROCEDURE — 83970 ASSAY OF PARATHORMONE: CPT

## 2023-11-20 PROCEDURE — 80053 COMPREHEN METABOLIC PANEL: CPT

## 2023-11-20 PROCEDURE — 85025 COMPLETE CBC W/AUTO DIFF WBC: CPT

## 2023-11-20 PROCEDURE — 80061 LIPID PANEL: CPT

## 2023-11-30 ENCOUNTER — OFFICE VISIT (OUTPATIENT)
Dept: WOUND CARE | Facility: CLINIC | Age: 68
End: 2023-11-30
Payer: MEDICARE

## 2023-11-30 VITALS
SYSTOLIC BLOOD PRESSURE: 171 MMHG | TEMPERATURE: 98 F | HEART RATE: 82 BPM | RESPIRATION RATE: 18 BRPM | DIASTOLIC BLOOD PRESSURE: 80 MMHG

## 2023-11-30 DIAGNOSIS — E08.621 DIABETIC ULCER OF OTHER PART OF RIGHT FOOT ASSOCIATED WITH DIABETES MELLITUS DUE TO UNDERLYING CONDITION, LIMITED TO BREAKDOWN OF SKIN (HCC): Primary | ICD-10-CM

## 2023-11-30 DIAGNOSIS — L97.511 DIABETIC ULCER OF OTHER PART OF RIGHT FOOT ASSOCIATED WITH DIABETES MELLITUS DUE TO UNDERLYING CONDITION, LIMITED TO BREAKDOWN OF SKIN (HCC): Primary | ICD-10-CM

## 2023-11-30 PROCEDURE — 11045 DBRDMT SUBQ TISS EACH ADDL: CPT | Performed by: PODIATRIST

## 2023-11-30 PROCEDURE — 11042 DBRDMT SUBQ TIS 1ST 20SQCM/<: CPT | Performed by: PODIATRIST

## 2023-11-30 RX ORDER — LIDOCAINE 40 MG/G
CREAM TOPICAL ONCE
Status: COMPLETED | OUTPATIENT
Start: 2023-11-30 | End: 2023-11-30

## 2023-11-30 RX ADMIN — LIDOCAINE: 40 CREAM TOPICAL at 14:02

## 2023-11-30 NOTE — PROGRESS NOTES
Patient ID: Risa Lanes is a 80 y.o. male Date of Birth 1955       Chief Complaint   Patient presents with    Follow Up Wound Care Visit     Right foot wound       Allergies:  Lisinopril    Diagnosis:  1. Diabetic ulcer of other part of right foot associated with diabetes mellitus due to underlying condition, limited to breakdown of skin (HCC)  -     lidocaine (LMX) 4 % cream  -     Wound cleansing and dressings; Future  -     Wound off loading; Future       Diagnosis ICD-10-CM Associated Orders   1. Diabetic ulcer of other part of right foot associated with diabetes mellitus due to underlying condition, limited to breakdown of skin (HCC)  E08.621 lidocaine (LMX) 4 % cream    L97.511 Wound cleansing and dressings     Wound off loading           Assessment & Plan:  See wound orders. The patient is not a candidate for tissue application due to his high A1c. Although he may become qualified with new medication and repeat blood work. His circulation and compliance with NWB has definitely helped with healing. Continue current strategy for healing. Subjective: The patient is seen for follow up of ulcer right foot. He notes he saw his doctor and had the A1c checked. It was 8.4. The patient was surprised at how high it was. It has always been the same and he was eating healthy but not as good as he does now. 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 He is not burning calories like he did in the past.  He even used to go to the gym a lot.         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[address])    Obesity with body mass index 30 or greater    Type 2 diabetes mellitus (HCC)    Retroperitoneal hematoma    Mesenteric lymphadenopathy    Acute blood loss anemia    Heart murmur    GI bleed    Pleural effusion    Chronic venous hypertension (idiopathic) with ulcer of right lower extremity (HCC)    Rash    FINA (acute kidney injury) ([address])    Stage 2 chronic kidney disease    Hypertensive kidney disease with stage 3 chronic kidney disease ([address])    Other proteinuria    Obesity, morbid (HCC)    Follicular lymphoma grade I of lymph nodes of multiple sites ([address])    Vitamin D deficiency    Stage 3a chronic kidney disease ([address])    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 ([address])     Past Medical History:   Diagnosis Date    Chronic kidney disease     Diabetes mellitus ([address])     Follicular lymphoma ([address])     High cholesterol     Hypertension      Past Surgical History:   Procedure Laterality Date    BUNIONECTOMY Right 11/24/2020    Procedure: RIGHT HAV CORRECTION,;  Surgeon: Chléo Gambino DPM;  [facility];  Service: Podiatry    COLONOSCOPY      INCISION AND DRAINAGE OF WOUND Right 10/05/2016    Procedure: INCISION AND DRAINAGE (I&D) EXTREMITY;  Surgeon: Chloé Gambino DPM;  [facility];  Service:     IR BIOPSY LYMPH NODE  07/08/2021    IR EMBOLIZATION (SPECIFY VESSEL OR SITE)  07/08/2021    IR PORT PLACEMENT  9/1/2022    PILONIDAL CYST EXCISION      [place] CORRECTION HAMMERTOE Right 02/19/2019    Procedure: THIRD HAMMER TOE CORRECTION;  Surgeon: Chloé Gambino DPM;  [facility];  Service: Podiatry    [place] RMVL MATEO CTR VAD W/SUBQ PORT/ CTR/PRPH INSJ N/A 3/14/2023    Procedure: REMOVAL VENOUS PORT (PORT-A-CATH)IR;  Surgeon: Aundrea Wesley DO;  [facility];  Service: Interventional Radiology    TOE OSTEOTOMY Right 03/14/2017    Procedure: HAMMERTOE CORRECTION R 2 ;  Surgeon: Chloé Gambino DPM;  [facility];  Service:     TOE OSTEOTOMY Left 11/24/2020    Procedure: LEFT HT CORRECTION TOE;  Surgeon: Chloé Gambino DPM; [facility];  Service: [address] [place] [address]     Social History     Socioeconomic History    Marital status: /Civil Union     Spouse name: None    Number of children: None    Years of education: None    Highest education level: None   Occupational History    None   Tobacco Use    Smoking status: Never    Smokeless tobacco: Never    Tobacco comments:     Never smoked but exposed to second hand smoke from birth until 19's   Vaping Use    Vaping Use: Never used   Substance and Sexual Activity    Alcohol use: No    Drug use: No    Sexual activity: Not Currently     Partners: Female     Birth control/protection: Abstinence   Other Topics Concern    None   Social History Narrative    None     Social Determinants of Health     Financial Resource Strain: Not on file   Food Insecurity: Not on file   Transportation Needs: Not on file   Physical Activity: Not on file   Stress: Not on file   Social Connections: Not on file   Intimate Partner Violence: Not on file   Housing Stability: Not on file        Current Outpatient Medications:     amLODIPine (NORVASC) 10 mg tablet, take 1 tablet by mouth once daily AT NOON, Disp: , Rfl:     aspirin 81 mg chewable tablet, Chew 81 mg daily, Disp: , Rfl:     Cholecalciferol 125 MCG (5000 UT) TABS, Take 5,000 Units by mouth in the morning, Disp: , Rfl:     Cholecalciferol 50 MCG (2000 UT) TABS, Take 1 tablet (2,000 Units total) by mouth daily (Patient not taking: Reported on 10/9/2023), Disp: , Rfl:     Empagliflozin (Jardiance) 25 MG TABS, Take 1 tablet (25 mg total) by mouth every morning, Disp: 90 tablet, Rfl: 2    hydrochlorothiazide (HYDRODIURIL) 25 mg tablet, Take 25 mg by mouth daily, Disp: , Rfl:     losartan (COZAAR) 100 MG tablet, Take 1 tablet (100 mg total) by mouth daily, Disp: , Rfl:     metFORMIN (GLUCOPHAGE) 500 mg tablet, Take 1,000 mg by mouth 2 (two) times a day with meals, Disp: , Rfl:     metoprolol succinate (TOPROL-XL) 100 mg 24 hr tablet, Take 100 mg by mouth every evening, Disp: , Rfl:     Multiple Vitamins-Minerals (Centrum Silver 50+Men) TABS, , Disp: , Rfl:     simvastatin (ZOCOR) 40 mg tablet, Take 40 mg by mouth daily at bedtime, Disp: , Rfl:   No current facility-administered medications for this visit. Family History   Problem Relation Age of Onset    Cancer Father         Leukemia      Review of Systems   Constitutional:  Negative for chills and fever. Objective:  BP (!) 171/80   Pulse 82   Temp 98 °F (36.7 °C)   Resp 18     Physical Exam  Neurological:      Mental Status: He is alert. Wound 09/01/22 Incision Chest Anterior;Right (Active)       Wound 03/14/23 Chest Right (Active)       Wound 09/07/23 Diabetic Ulcer Foot Right;Plantar;Posterior (Active)   Enter Zeny Moncada score: Zeny Moncada Grade 1: Partial or full-thickness ulcer (superficial) 11/30/23 1356   Wound Image   11/30/23 1425   Wound Description Pink;Yellow; White 11/30/23 1356   Delisa-wound Assessment Maceration;Callus 11/30/23 1356   Wound Length (cm) 1.1 cm 11/30/23 1356   Wound Width (cm) 1.6 cm 11/30/23 1356   Wound Depth (cm) 0.1 cm 11/30/23 1356   Wound Surface Area (cm^2) 1.76 cm^2 11/30/23 1356   Wound Volume (cm^3) 0.176 cm^3 11/30/23 1356   Calculated Wound Volume (cm^3) 0.18 cm^3 11/30/23 1356   Change in Wound Size % 82 11/30/23 1356   Tunneling in depth located at 1-12 o'clock 10/05/23 1348   Undermining 0.1 10/12/23 1408   Undermining is depth extending from 8 to 9 oclock 10/12/23 1408   Drainage Amount Moderate 11/30/23 1356   Drainage Description Serosanguineous 11/30/23 1356   Non-staged Wound Description Full thickness 11/30/23 1356   Dressing Status Intact 11/30/23 1356                         Debridement   Wound 09/07/23 Diabetic Ulcer Foot Right;Plantar;Posterior    Universal Protocol:  Consent: Verbal consent obtained.   Consent given by: patient  Patient understanding: patient states understanding of the procedure being performed  Patient identity confirmed: verbally with patient    Performed by: physician  Debridement type: surgical  Level of debridement: subcutaneous tissue  Pain control: lidocaine 1%  Post-debridement measurements  Length (cm): 1.2  Width (cm): 1.7  Depth (cm): 0.2  Percent debrided: 100%  Surface Area (cm^2): 2.04  Area debrided (cm^2): 2.04  Volume (cm^3): 0.41  Tissue and other material debrided: subcutaneous tissue  Devitalized tissue debrided: callus and eschar  Instrument(s) utilized: curette and blade  Bleeding: medium  Hemostasis obtained with: pressure and silver nitrate  Procedural pain (0-10): insensate  Post-procedural pain: insensate   Response to treatment: procedure was tolerated well                 Wound Instructions:  Orders Placed This Encounter   Procedures    Wound cleansing and dressings     ight Foot Wound      Wash your hands with soap and water. Remove old dressing, discard into plastic bag and place in trash. Cleanse the wound with Dakins solution 5 to 10 minute soak then remove wet gauze and pat dry prior to applying a clean dressing. Do not use tissue or cotton balls. Do not scrub the wound. Shower: no cant get wet in shower      Apply skin prep to skin surrounding wound Place double felt pad to help offload pressure  Apply maxsorb ag (silver alginate) to the foot wound. Cover with abd pad Secure with buffy and tape   Change dressing every other day or sooner if needed for drainage     The above was completed today at the wound center     Standing Status:   Future     Standing Expiration Date:   11/30/2024    Wound off loading     Standing Status:   Future      Standing Expiration Date:   11/16/2024  · Wound off loading      Keep weight and pressure off wound at all times. Wear off loading shoe and pad to help reduce pressure.  Felt pad around wound to off load Use crutches to keep weight off     Standing Status:   Future     Standing Expiration Date:   11/30/2024         Susie Mendoza DPM      Portions of the record may have been created with voice recognition software. Occasional wrong word or "sound a like" substitutions may have occurred due to the inherent limitations of voice recognition software. Read the chart carefully and recognize, using context, where substitutions have occurred.

## 2023-11-30 NOTE — PATIENT INSTRUCTIONS
Orders Placed This Encounter   Procedures    Wound cleansing and dressings     ight Foot Wound      Wash your hands with soap and water. Remove old dressing, discard into plastic bag and place in trash. Cleanse the wound with Dakins solution 5 to 10 minute soak then remove wet gauze and pat dry prior to applying a clean dressing. Do not use tissue or cotton balls. Do not scrub the wound. Shower: no cant get wet in shower      Apply skin prep to skin surrounding wound Place double felt pad to help offload pressure  Apply maxsorb ag (silver alginate) to the foot wound. Cover with abd pad Secure with buffy and tape   Change dressing every other day or sooner if needed for drainage     The above was completed today at the wound center     Standing Status:   Future     Standing Expiration Date:   11/30/2024    Wound off loading     Standing Status:   Future      Standing Expiration Date:   11/16/2024  · Wound off loading      Keep weight and pressure off wound at all times. Wear off loading shoe and pad to help reduce pressure.  Felt pad around wound to off load Use crutches to keep weight off     Standing Status:   Future     Standing Expiration Date:   11/30/2024

## 2023-12-07 ENCOUNTER — OFFICE VISIT (OUTPATIENT)
Dept: WOUND CARE | Facility: CLINIC | Age: 68
End: 2023-12-07
Payer: MEDICARE

## 2023-12-07 VITALS
DIASTOLIC BLOOD PRESSURE: 69 MMHG | RESPIRATION RATE: 20 BRPM | TEMPERATURE: 98 F | SYSTOLIC BLOOD PRESSURE: 143 MMHG | HEART RATE: 85 BPM

## 2023-12-07 DIAGNOSIS — E08.621 DIABETIC ULCER OF OTHER PART OF RIGHT FOOT ASSOCIATED WITH DIABETES MELLITUS DUE TO UNDERLYING CONDITION, LIMITED TO BREAKDOWN OF SKIN (HCC): Primary | ICD-10-CM

## 2023-12-07 DIAGNOSIS — L97.511 DIABETIC ULCER OF OTHER PART OF RIGHT FOOT ASSOCIATED WITH DIABETES MELLITUS DUE TO UNDERLYING CONDITION, LIMITED TO BREAKDOWN OF SKIN (HCC): Primary | ICD-10-CM

## 2023-12-07 PROCEDURE — 11042 DBRDMT SUBQ TIS 1ST 20SQCM/<: CPT | Performed by: PODIATRIST

## 2023-12-07 RX ORDER — LIDOCAINE 40 MG/G
CREAM TOPICAL ONCE
Status: COMPLETED | OUTPATIENT
Start: 2023-12-07 | End: 2023-12-07

## 2023-12-07 RX ADMIN — LIDOCAINE 1 APPLICATION: 40 CREAM TOPICAL at 13:52

## 2023-12-07 NOTE — PROGRESS NOTES
Patient ID: Marin Salazar is a 80 y.o. male Date of Birth 1955       Chief Complaint   Patient presents with    Follow Up Wound Care Visit     Right toe wound       Allergies:  Lisinopril    Diagnosis:  1. Diabetic ulcer of other part of right foot associated with diabetes mellitus due to underlying condition, limited to breakdown of skin (HCC)  -     lidocaine (LMX) 4 % cream  -     Wound cleansing and dressings; Future  -     Wound off loading; Future       Diagnosis ICD-10-CM Associated Orders   1. Diabetic ulcer of other part of right foot associated with diabetes mellitus due to underlying condition, limited to breakdown of skin (HCC)  E08.621 lidocaine (LMX) 4 % cream    L97.511 Wound cleansing and dressings     Wound off loading           Assessment & Plan:  See wound orders. Singh grade 2 ulcer right foot to fat. -Patient must use crutches and stay off the foot. Although his BS was better it did not help for wound healing. Patient understands now the importance of NWB over BS control. Continue to off-load and use crutches. Subjective: The patient decided to try to get his blood sugar down this week by becoming more active. He may have walked on the foot more than in past week. His BS did seem to come down about 30 points with the increased activity.              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[address])    Obesity with body mass index 30 or greater    Type 2 diabetes mellitus (HCC)    Retroperitoneal hematoma    Mesenteric lymphadenopathy    Acute blood loss anemia    Heart murmur    GI bleed    Pleural effusion    Chronic venous hypertension (idiopathic) with ulcer of right lower extremity (HCC)    Rash    FINA (acute kidney injury) ([address])    Stage 2 chronic kidney disease    Hypertensive kidney disease with stage 3 chronic kidney disease ([address])    Other proteinuria    Obesity, morbid (HCC)    Follicular lymphoma grade I of lymph nodes of multiple sites ([address])    Vitamin D deficiency    Stage 3a chronic kidney disease ([address])    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 ([address])     Past Medical History:   Diagnosis Date    Chronic kidney disease     Diabetes mellitus ([address])     Follicular lymphoma ([address])     High cholesterol     Hypertension      Past Surgical History:   Procedure Laterality Date    BUNIONECTOMY Right 11/24/2020    Procedure: RIGHT HAV CORRECTION,;  Surgeon: Prem Vernon DPM;  [facility];  Service: Podiatry    COLONOSCOPY      INCISION AND DRAINAGE OF WOUND Right 10/05/2016    Procedure: INCISION AND DRAINAGE (I&D) EXTREMITY;  Surgeon: Prem Vernon DPM;  [facility];  Service:     IR BIOPSY LYMPH NODE  07/08/2021    IR EMBOLIZATION (SPECIFY VESSEL OR SITE)  07/08/2021    IR PORT PLACEMENT  9/1/2022    PILONIDAL CYST EXCISION      [place] CORRECTION HAMMERTOE Right 02/19/2019    Procedure: THIRD HAMMER TOE CORRECTION;  Surgeon: Prem Vernon DPM;  [facility];  Service: Podiatry    [place] RMVL MATEO CTR VAD W/SUBQ PORT/ CTR/PRPH INSJ N/A 3/14/2023    Procedure: REMOVAL VENOUS PORT (PORT-A-CATH)IR;  Surgeon: America Crow DO;  [facility];  Service: Interventional Radiology    TOE OSTEOTOMY Right 03/14/2017    Procedure: HAMMERTOE CORRECTION R 2 ;  Surgeon: Prem Vernon DPM;  [facility];  Service:     TOE OSTEOTOMY Left 11/24/2020    Procedure: LEFT HT CORRECTION TOE;  Surgeon: Prem Vernon DPM;  [facility];  Service: Podiatry    TONSILLECTOMY  1963     Social History     Socioeconomic History    Marital status: /Civil Union     Spouse name: None    Number of children: None    Years of education: None    Highest education level: None   Occupational History    None   Tobacco Use    Smoking status: Never    Smokeless tobacco: Never    Tobacco comments:     Never smoked but exposed to second hand smoke from birth until 19's   Vaping Use    Vaping Use: Never used   Substance and Sexual Activity    Alcohol use: No    Drug use: No    Sexual activity: Not Currently     Partners: Female     Birth control/protection: Abstinence   Other Topics Concern    None   Social History Narrative    None     Social Determinants of Health     Financial Resource Strain: Not on file   Food Insecurity: Not on file   Transportation Needs: Not on file   Physical Activity: Not on file   Stress: Not on file   Social Connections: Not on file   Intimate Partner Violence: Not on file   Housing Stability: Not on file        Current Outpatient Medications:     amLODIPine (NORVASC) 10 mg tablet, take 1 tablet by mouth once daily AT NOON, Disp: , Rfl:     aspirin 81 mg chewable tablet, Chew 81 mg daily, Disp: , Rfl:     Cholecalciferol 125 MCG (5000 UT) TABS, Take 5,000 Units by mouth in the morning, Disp: , Rfl:     Cholecalciferol 50 MCG (2000 UT) TABS, Take 1 tablet (2,000 Units total) by mouth daily (Patient not taking: Reported on 10/9/2023), Disp: , Rfl:     Empagliflozin (Jardiance) 25 MG TABS, Take 1 tablet (25 mg total) by mouth every morning, Disp: 90 tablet, Rfl: 2    hydrochlorothiazide (HYDRODIURIL) 25 mg tablet, Take 25 mg by mouth daily, Disp: , Rfl:     losartan (COZAAR) 100 MG tablet, Take 1 tablet (100 mg total) by mouth daily, Disp: , Rfl:     metFORMIN (GLUCOPHAGE) 500 mg tablet, Take 1,000 mg by mouth 2 (two) times a day with meals, Disp: , Rfl:     metoprolol succinate (TOPROL-XL) 100 mg 24 hr tablet, Take 100 mg by mouth every evening, Disp: , Rfl:     Multiple Vitamins-Minerals (Centrum Silver 50+Men) TABS, , Disp: , Rfl:     simvastatin (ZOCOR) 40 mg tablet, Take 40 mg by mouth daily at bedtime, Disp: , Rfl:   No current facility-administered medications for this visit. Family History   Problem Relation Age of Onset    Cancer Father         Leukemia      Review of Systems   Constitutional:  Negative for chills and fever. Objective:  /69   Pulse 85   Temp 98 °F (36.7 °C)   Resp 20     Physical Exam  Neurological:      Mental Status: He is alert. Wound 09/01/22 Incision Chest Anterior;Right (Active)       Wound 03/14/23 Chest Right (Active)       Wound 09/07/23 Diabetic Ulcer Foot Right;Plantar;Posterior (Active)   Enter Ashli Adan score: Ashli Adan Grade 1: Partial or full-thickness ulcer (superficial) 12/07/23 1351   Wound Image   12/07/23 1351   Wound Description Pink;Yellow; White 12/07/23 1351   Delisa-wound Assessment Maceration;Callus 12/07/23 1351   Wound Length (cm) 1 cm 12/07/23 1351   Wound Width (cm) 1.5 cm 12/07/23 1351   Wound Depth (cm) 0.1 cm 12/07/23 1351   Wound Surface Area (cm^2) 1.5 cm^2 12/07/23 1351   Wound Volume (cm^3) 0.15 cm^3 12/07/23 1351   Calculated Wound Volume (cm^3) 0.15 cm^3 12/07/23 1351   Change in Wound Size % 85 12/07/23 1351   Tunneling in depth located at 1-12 o'clock 10/05/23 1348   Undermining 0.1 10/12/23 1408   Undermining is depth extending from 8 to 9 oclock 10/12/23 1408   Drainage Amount Moderate 12/07/23 1351   Drainage Description Serosanguineous 12/07/23 1351   Non-staged Wound Description Full thickness 12/07/23 1351   Dressing Status Intact 12/07/23 1351                         Debridement   Wound 09/07/23 Diabetic Ulcer Foot Right;Plantar;Posterior    Universal Protocol:  Consent: Verbal consent obtained.   Consent given by: patient  Patient understanding: patient states understanding of the procedure being performed  Patient identity confirmed: verbally with patient    Performed by: physician  Debridement type: surgical  Level of debridement: subcutaneous tissue  Pain control: lidocaine 1%  Post-debridement measurements  Length (cm): 1.1  Width (cm): 1.8  Depth (cm): 0.2  Percent debrided: 100%  Surface Area (cm^2): 1.98  Area debrided (cm^2): 1.98  Volume (cm^3): 0.4  Tissue and other material debrided: subcutaneous tissue  Devitalized tissue debrided: callus and slough  Instrument(s) utilized: curette  Bleeding: medium  Hemostasis obtained with: pressure and silver nitrate  Procedural pain (0-10): insensate  Post-procedural pain: insensate   Response to treatment: procedure was tolerated well                 Wound Instructions:  Orders Placed This Encounter   Procedures    Wound cleansing and dressings     Right Foot Wound      Wash your hands with soap and water. Remove old dressing, discard into plastic bag and place in trash. Cleanse the wound with Dakins solution 5 to 10 minute soak then remove wet gauze and pat dry prior to applying a clean dressing. Do not use tissue or cotton balls. Do not scrub the wound. Shower: no cant get wet in shower      Apply skin prep to skin surrounding wound Place double felt pad to help offload pressure  Apply maxsorb ag (silver alginate) to the foot wound. Cover with abd pad Secure with buffy and tape   Change dressing every other day or sooner if needed for drainage     The above was completed today at the wound center     Standing Status:   Future     Standing Expiration Date:   12/7/2024    Wound off loading     Keep weight and pressure off wound at all times. Wear off loading shoe and pad to help reduce pressure. Felt pad around wound to off load Use crutches to keep weight off     Standing Status:   Future     Standing Expiration Date:   12/7/2024    Debridement     This order was created via procedure documentation         Susie Ruiz DPM      Portions of the record may have been created with voice recognition software. Occasional wrong word or "sound a like" substitutions may have occurred due to the inherent limitations of voice recognition software.  Read the chart carefully and recognize, using context, where substitutions have occurred.

## 2023-12-07 NOTE — PATIENT INSTRUCTIONS
Orders Placed This Encounter   Procedures    Wound cleansing and dressings     Right Foot Wound      Wash your hands with soap and water. Remove old dressing, discard into plastic bag and place in trash. Cleanse the wound with Dakins solution 5 to 10 minute soak then remove wet gauze and pat dry prior to applying a clean dressing. Do not use tissue or cotton balls. Do not scrub the wound. Shower: no cant get wet in shower      Apply skin prep to skin surrounding wound Place double felt pad to help offload pressure  Apply maxsorb ag (silver alginate) to the foot wound. Cover with abd pad Secure with buffy and tape   Change dressing every other day or sooner if needed for drainage     The above was completed today at the wound center     Standing Status:   Future     Standing Expiration Date:   12/7/2024    Wound off loading     Keep weight and pressure off wound at all times. Wear off loading shoe and pad to help reduce pressure.  Felt pad around wound to off load Use crutches to keep weight off     Standing Status:   Future     Standing Expiration Date:   12/7/2024

## 2023-12-14 ENCOUNTER — OFFICE VISIT (OUTPATIENT)
Dept: WOUND CARE | Facility: CLINIC | Age: 68
End: 2023-12-14
Payer: MEDICARE

## 2023-12-14 VITALS
RESPIRATION RATE: 18 BRPM | SYSTOLIC BLOOD PRESSURE: 130 MMHG | HEART RATE: 80 BPM | DIASTOLIC BLOOD PRESSURE: 65 MMHG | TEMPERATURE: 97.6 F

## 2023-12-14 DIAGNOSIS — E08.621 DIABETIC ULCER OF OTHER PART OF RIGHT FOOT ASSOCIATED WITH DIABETES MELLITUS DUE TO UNDERLYING CONDITION, LIMITED TO BREAKDOWN OF SKIN (HCC): Primary | ICD-10-CM

## 2023-12-14 DIAGNOSIS — L97.511 DIABETIC ULCER OF OTHER PART OF RIGHT FOOT ASSOCIATED WITH DIABETES MELLITUS DUE TO UNDERLYING CONDITION, LIMITED TO BREAKDOWN OF SKIN (HCC): Primary | ICD-10-CM

## 2023-12-14 PROCEDURE — 11042 DBRDMT SUBQ TIS 1ST 20SQCM/<: CPT | Performed by: PODIATRIST

## 2023-12-14 PROCEDURE — 11045 DBRDMT SUBQ TISS EACH ADDL: CPT | Performed by: PODIATRIST

## 2023-12-14 RX ORDER — LIDOCAINE 40 MG/G
CREAM TOPICAL ONCE
Status: COMPLETED | OUTPATIENT
Start: 2023-12-14 | End: 2023-12-14

## 2023-12-14 RX ADMIN — LIDOCAINE: 40 CREAM TOPICAL at 14:01

## 2023-12-14 NOTE — PATIENT INSTRUCTIONS
Orders Placed This Encounter   Procedures    Wound cleansing and dressings Diabetic Ulcer Right;Plantar;Posterior Foot     Right Foot Wound      Wash your hands with soap and water. Remove old dressing, discard into plastic bag and place in trash. Cleanse the wound with Dakins solution 5 to 10 minute soak then remove wet gauze and pat dry prior to applying a clean dressing. Do not use tissue or cotton balls. Do not scrub the wound. Shower: no cant get wet in shower      Apply skin prep to skin surrounding wound Place double felt pad to help offload pressure  Apply maxsorb ag (silver alginate) to the foot wound. Cover with abd pad Secure with buffy and tape   Change dressing every other day or sooner if needed for drainage     The above was completed today at the wound center     Standing Status:   Future     Standing Expiration Date:   12/14/2024    Wound off loading Diabetic Ulcer Right;Plantar;Posterior Foot     Standing Status:   Future     Standing Expiration Date:   12/14/2024     Scheduling Instructions:      Off Loading          Keep weight and pressure off wound at all times. Wear off loading shoe and pad to help reduce pressure.  Felt pad around wound to off load Use crutches to keep weight off

## 2023-12-14 NOTE — PROGRESS NOTES
Patient ID: Rebecca Lemus is a 80 y.o. male Date of Birth 1955       Chief Complaint   Patient presents with    Follow Up Wound Care Visit     Diabetic ulcer right foot       Allergies:  Lisinopril    Diagnosis:  1. Diabetic ulcer of other part of right foot associated with diabetes mellitus due to underlying condition, limited to breakdown of skin (HCC)  -     lidocaine (LMX) 4 % cream  -     Wound cleansing and dressings Diabetic Ulcer Right;Plantar;Posterior Foot; Future  -     Wound off loading Diabetic Ulcer Right;Plantar;Posterior Foot; Future       Diagnosis ICD-10-CM Associated Orders   1. Diabetic ulcer of other part of right foot associated with diabetes mellitus due to underlying condition, limited to breakdown of skin (HCC)  E08.621 lidocaine (LMX) 4 % cream    L97.511 Wound cleansing and dressings Diabetic Ulcer Right;Plantar;Posterior Foot     Wound off loading Diabetic Ulcer Right;Plantar;Posterior Foot           Assessment & Plan:  See wound orders. A long discussion is held on this patient's status. He is failing to progress. Ways to help aid wound healing would be a total contact cast.  He does not want to have a cat right now as he needs to drive. He could have higher compliance with his diet and NWB with the crutches. He can have surgery on the foot. I did review prior x-rays today. Sesamoid removal or first MTPJ fusion can be done. Patient will continue current course for now. Subjective: The patient states his week was about the same as usual.  This means he used the crutches about 90% of the time. 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 Blood sugars are 170s typically. His diet has remained the same.         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[address])    Hypertension    Hypercholesteremia Hammer toe of right foot    Stasis dermatitis of both legs    Diabetic peripheral neuropathy (HCC)    Gout    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HCC)    Obesity with body mass index 30 or greater    Type 2 diabetes mellitus (HCC)    Retroperitoneal hematoma    Mesenteric lymphadenopathy    Acute blood loss anemia    Heart murmur    GI bleed    Pleural effusion    Chronic venous hypertension (idiopathic) with ulcer of right lower extremity (HCC)    Rash    FINA (acute kidney injury) ([address])    Stage 2 chronic kidney disease    Hypertensive kidney disease with stage 3 chronic kidney disease (HCC)    Other proteinuria    Obesity, morbid (HCC)    Follicular lymphoma grade I of lymph nodes of multiple sites ([address])    Vitamin D deficiency    Stage 3a chronic kidney disease ([address])    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 ([address])     Past Medical History:   Diagnosis Date    Chronic kidney disease     Diabetes mellitus ([address])     Follicular lymphoma ([address])     High cholesterol     Hypertension      Past Surgical History:   Procedure Laterality Date    BUNIONECTOMY Right 11/24/2020    Procedure: RIGHT HAV CORRECTION,;  Surgeon: Nava Reyes DPM;  [facility];  Service: Podiatry    COLONOSCOPY      INCISION AND DRAINAGE OF WOUND Right 10/05/2016    Procedure: INCISION AND DRAINAGE (I&D) EXTREMITY;  Surgeon: Nava Reyes DPM;  [facility];  Service:     IR BIOPSY LYMPH NODE  07/08/2021    IR EMBOLIZATION (SPECIFY VESSEL OR SITE)  07/08/2021    IR PORT PLACEMENT  9/1/2022    PILONIDAL CYST EXCISION      [place] CORRECTION HAMMERTOE Right 02/19/2019    Procedure: THIRD HAMMER TOE CORRECTION;  Surgeon: Nava Reyes DPM;  [facility];  Service: Podiatry    [place] RMVL MATEO CTR VAD W/SUBQ PORT/ CTR/PRPH INSJ N/A 3/14/2023    Procedure: REMOVAL VENOUS PORT (PORT-A-CATH)IR;  Surgeon: Keyana Barrera DO;  [facility];  Service: Interventional Radiology    TOE OSTEOTOMY Right 03/14/2017    Procedure: HAMMERTOE CORRECTION R 2 ;  Surgeon: Jatinder Win DPM;  [facility];  Service:     TOE OSTEOTOMY Left 11/24/2020    Procedure: LEFT HT CORRECTION TOE;  Surgeon: Jatinder Win DPM;  [facility];  Service: [address] [place] [address] History     Socioeconomic History    Marital status: /Civil Union     Spouse name: Not on file    Number of children: Not on file    Years of education: Not on file    Highest education level: Not on file   Occupational History    Not on file   Tobacco Use    Smoking status: Never    Smokeless tobacco: Never    Tobacco comments:     Never smoked but exposed to second hand smoke from birth until 19's   Vaping Use    Vaping status: Never Used   Substance and Sexual Activity    Alcohol use: No    Drug use: No    Sexual activity: Not Currently     Partners: Female     Birth control/protection: Abstinence   Other Topics Concern    Not on file   Social History Narrative    Not on file     Social Determinants of Health     Financial Resource Strain: Not on file   Food Insecurity: Not on file   Transportation Needs: Not on file   Physical Activity: Not on file   Stress: Not on file   Social Connections: Not on file   Intimate Partner Violence: Not on file   Housing Stability: Not on file        Current Outpatient Medications:     amLODIPine (NORVASC) 10 mg tablet, take 1 tablet by mouth once daily AT NOON, Disp: , Rfl:     aspirin 81 mg chewable tablet, Chew 81 mg daily, Disp: , Rfl:     Cholecalciferol 125 MCG (5000 UT) TABS, Take 5,000 Units by mouth in the morning, Disp: , Rfl:     Cholecalciferol 50 MCG (2000 UT) TABS, Take 1 tablet (2,000 Units total) by mouth daily (Patient not taking: Reported on 10/9/2023), Disp: , Rfl:     Empagliflozin (Jardiance) 25 MG TABS, Take 1 tablet (25 mg total) by mouth every morning, Disp: 90 tablet, Rfl: 2    hydrochlorothiazide (HYDRODIURIL) 25 mg tablet, Take 25 mg by mouth daily, Disp: , Rfl:     losartan (COZAAR) 100 MG tablet, Take 1 tablet (100 mg total) by mouth daily, Disp: , Rfl:     metFORMIN (GLUCOPHAGE) 500 mg tablet, Take 1,000 mg by mouth 2 (two) times a day with meals, Disp: , Rfl:     metoprolol succinate (TOPROL-XL) 100 mg 24 hr tablet, Take 100 mg by mouth every evening, Disp: , Rfl:     Multiple Vitamins-Minerals (Centrum Silver 50+Men) TABS, , Disp: , Rfl:     simvastatin (ZOCOR) 40 mg tablet, Take 40 mg by mouth daily at bedtime, Disp: , Rfl:   No current facility-administered medications for this visit. Family History   Problem Relation Age of Onset    Cancer Father         Leukemia      Review of Systems   Constitutional:  Negative for chills and fever. Objective:  /65   Pulse 80   Temp 97.6 °F (36.4 °C)   Resp 18     Physical Exam  Neurological:      Mental Status: He is alert. Wound 09/01/22 Incision Chest Anterior;Right (Active)       Wound 03/14/23 Chest Right (Active)       Wound 09/07/23 Diabetic Ulcer Foot Right;Plantar;Posterior (Active)   Enter Emelia Burkitt score: Emelia Burkitt Grade 1: Partial or full-thickness ulcer (superficial) 12/07/23 1351   Wound Image   12/14/23 1426   Wound Description Pink;Yellow; White;Eschar 12/14/23 1353   Delisa-wound Assessment Maceration;Callus 12/14/23 1353   Wound Length (cm) 1 cm 12/14/23 1353   Wound Width (cm) 3.5 cm 12/14/23 1353   Wound Depth (cm) 0.2 cm 12/14/23 1353   Wound Surface Area (cm^2) 3.5 cm^2 12/14/23 1353   Wound Volume (cm^3) 0.7 cm^3 12/14/23 1353   Calculated Wound Volume (cm^3) 0.7 cm^3 12/14/23 1353   Change in Wound Size % 30 12/14/23 1353   Tunneling in depth located at 1-12 o'clock 10/05/23 1348   Undermining 0.1 10/12/23 1408   Undermining is depth extending from 8 to 9 oclock 10/12/23 1408   Drainage Amount Moderate 12/14/23 1353   Drainage Description Serosanguineous;Milky;Brown 12/14/23 1353   Non-staged Wound Description Full thickness 12/14/23 1353   Dressing Status Intact 12/14/23 1353                         Debridement Wound 09/07/23 Diabetic Ulcer Foot Right;Plantar;Posterior    Universal Protocol:  Consent: Verbal consent obtained. Patient understanding: patient states understanding of the procedure being performed  Patient identity confirmed: verbally with patient    Debridement Details  Performed by: physician  Debridement type: surgical  Level of debridement: subcutaneous tissue  Pain control: lidocaine 1%      Post-debridement measurements  Length (cm): 1.3  Width (cm): 3.7  Depth (cm): 0.2  Percent debrided: 100%  Surface Area (cm^2): 4.81  Area Debrided (cm^2): 4.81  Volume (cm^3): 0.96    Tissue and other material debrided: subcutaneous tissue  Devitalized tissue debrided: callus and slough  Instrument(s) utilized: curette and nippers  Bleeding: medium  Hemostasis obtained with: pressure and silver nitrate  Procedural pain (0-10): insensate  Post-procedural pain: insensate   Response to treatment: procedure was tolerated well                 Wound Instructions:  Orders Placed This Encounter   Procedures    Wound cleansing and dressings Diabetic Ulcer Right;Plantar;Posterior Foot     Right Foot Wound      Wash your hands with soap and water. Remove old dressing, discard into plastic bag and place in trash. Cleanse the wound with Dakins solution 5 to 10 minute soak then remove wet gauze and pat dry prior to applying a clean dressing. Do not use tissue or cotton balls. Do not scrub the wound. Shower: no cant get wet in shower      Apply skin prep to skin surrounding wound Place double felt pad to help offload pressure  Apply maxsorb ag (silver alginate) to the foot wound.    Cover with abd pad Secure with buffy and tape   Change dressing every other day or sooner if needed for drainage     The above was completed today at the wound center     Standing Status:   Future     Standing Expiration Date:   12/14/2024    Wound off loading Diabetic Ulcer Right;Plantar;Posterior Foot     Standing Status:   Future     Standing Expiration Date:   12/14/2024     Scheduling Instructions:      Off Loading          Keep weight and pressure off wound at all times. Wear off loading shoe and pad to help reduce pressure. Felt pad around wound to off load Use crutches to keep weight off         [address], DPM      Portions of the record may have been created with voice recognition software. Occasional wrong word or "sound a like" substitutions may have occurred due to the inherent limitations of voice recognition software. Read the chart carefully and recognize, using context, where substitutions have occurred.

## 2023-12-20 ENCOUNTER — OFFICE VISIT (OUTPATIENT)
Dept: BARIATRICS | Facility: CLINIC | Age: 68
End: 2023-12-20
Payer: MEDICARE

## 2023-12-20 VITALS
HEART RATE: 86 BPM | SYSTOLIC BLOOD PRESSURE: 152 MMHG | HEIGHT: 74 IN | BODY MASS INDEX: 39.73 KG/M2 | OXYGEN SATURATION: 96 % | WEIGHT: 309.6 LBS | RESPIRATION RATE: 18 BRPM | DIASTOLIC BLOOD PRESSURE: 82 MMHG

## 2023-12-20 DIAGNOSIS — N18.30 DM TYPE 2 CAUSING CKD STAGE 3 (HCC): Primary | ICD-10-CM

## 2023-12-20 DIAGNOSIS — C48.1: ICD-10-CM

## 2023-12-20 DIAGNOSIS — E66.01 MORBID OBESITY DUE TO EXCESS CALORIES (HCC): ICD-10-CM

## 2023-12-20 DIAGNOSIS — E78.1 HYPERTRIGLYCERIDEMIA: ICD-10-CM

## 2023-12-20 DIAGNOSIS — I10 HYPERTENSION: ICD-10-CM

## 2023-12-20 DIAGNOSIS — E11.22 DM TYPE 2 CAUSING CKD STAGE 3 (HCC): Primary | ICD-10-CM

## 2023-12-20 DIAGNOSIS — M10.9 GOUT: ICD-10-CM

## 2023-12-20 DIAGNOSIS — E13.9 DIABETES 1.5, MANAGED AS TYPE 2 (HCC): ICD-10-CM

## 2023-12-20 PROCEDURE — 99204 OFFICE O/P NEW MOD 45 MIN: CPT | Performed by: FAMILY MEDICINE

## 2023-12-20 RX ORDER — ROSUVASTATIN CALCIUM 40 MG/1
40 TABLET, COATED ORAL EVERY EVENING
COMMUNITY
Start: 2023-11-21

## 2023-12-20 NOTE — PROGRESS NOTES
Assessment/Plan:  Wilmer was seen today for consult.    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, dormant  Gout  Well controlled uric acid  Hypertriglyceridemia  -     Lipid Panel with Direct LDL reflex; Future       Component  Ref Range & Units 11/20/23  2:18 PM 5/18/23  1:46 PM 1/31/23  4:08 PM 8/19/22  2:31 PM 7/11/22  1:33 PM 2/18/22 12:35 PM 7/10/21  2:17 AM   Uric Acid  3.5 - 8.5 mg/dL 4.7 9.0 High                 Desirable         <200 mg/dL      Borderline High   200-239 mg/dL      High             >239 mg/dL  Triglycerides  See Comment mg/dL590 High 384 High   High  R228 High   High  R,  High  R, CM  140 R, CM  Comment: Triglyceride:     0-9Y            <75mg/dL     10Y-17Y         <90 mg/dL  Obesity:   Weight not at goal and patient tried more than 6 months to lose weight and was not able to achieve a meaningful weight loss above 5%  - Discussed options of [facility]ensive Lifestyle Intervention Program and the role of weight loss medications.  - Patient is interested in pursuing [facility] Intervention Program  - Initial weight loss goal of 5-10% weight loss for improved health  - Weight loss can improve patient's co-morbid conditions and/or prevent weight-related complications.  Motivational interview performed and patient noted changes to work on until next visit  Handouts provided:  Snack list  Hydration:  64oz fluid, no sugary drinks  Goal 3 meals per day  Food log encouraged , phone [facility] or paper journal  Increase physical activity by 10 minutes daily.   Return in 4 mo    Subjective:   Chief Complaint   Patient presents with    Consult     Initial Consult. SB =  .  Waist: 54 inches       Patient ID: Augustus Coffman  is a 68 y.o. male with excess weight/obesity here to pursue weight management.  Previous notes and records have been reviewed.        HPI  Wt Readings from Last 20 Encounters:   23 (!) 140 kg (309 lb 9.6 oz)   10/09/23 97.5 kg (215 lb)   23 (!) 141 kg (310 lb)   23 (!) 141 kg (310 lb)   23 (!) 141 kg (310 lb)   23 (!) 142 kg (314 lb)   23 (!) 139 kg (306 lb 6.4 oz)   23 (!) 138 kg (305 lb 3.2 oz)   23 (!) 141 kg (310 lb)   23 (!) 140 kg (307 lb 9.6 oz)   23 (!) 139 kg (307 lb 6.4 oz)   22 (!) 144 kg (316 lb 6.4 oz)   22 (!) 143 kg (316 lb)   22 (!) 142 kg (312 lb)   22 (!) 143 kg (314 lb 3.2 oz)   22 (!) 141 kg (311 lb)   10/28/22 (!) 139 kg (307 lb)   10/05/22 (!) 140 kg (308 lb 3.2 oz)   10/04/22 136 kg (299 lb)   22 136 kg (300 lb)     Obesity/Excess Weight:Body mass index is 40.02 kg/m².    Severity: severe  Onset:    years ago mainly after chemo  Modifiers: Diet and Exercise  Contributing factors: Poor Food Choices  Associated symptoms: comorbid conditions  Not eating 3 meals a day eating only once a day    Hydration:water, diet soda , orange juice  Alcohol: no  Smoking:no  Exercise:no has a wound on his foot   Occupation:owns a business   Sleep:well  STOP ban/8    Past Medical History:   Diagnosis Date    Chronic kidney disease     Diabetes mellitus (HCC)     Follicular lymphoma (HCC)     High cholesterol     Hypertension      Past Surgical History:   Procedure Laterality Date    BUNIONECTOMY Right 2020    Procedure: RIGHT HAV CORRECTION,;  Surgeon: Niranjan Child DPM;  [facility];   Service: Podiatry    COLONOSCOPY      INCISION AND DRAINAGE OF WOUND Right 10/05/2016    Procedure: INCISION AND DRAINAGE (I&D) EXTREMITY;  Surgeon: Niranjan Child DPM;  [facility];  Service:     IR BIOPSY LYMPH NODE  07/08/2021    IR EMBOLIZATION (SPECIFY VESSEL OR SITE)  07/08/2021    IR PORT PLACEMENT  9/1/2022    PILONIDAL CYST EXCISION      [place] CORRECTION HAMMERTOE Right 02/19/2019    Procedure: THIRD HAMMER TOE CORRECTION;  Surgeon: Niranjan Child DPM;  [facility];  Service: Podiatry    [place] RMVL MATEO CTR VAD W/SUBQ PORT/ CTR/PRPH INSJ N/A 3/14/2023    Procedure: REMOVAL VENOUS PORT (PORT-A-CATH)IR;  Surgeon: Avelino Vázquez DO;  [facility];  Service: Interventional Radiology    TOE OSTEOTOMY Right 03/14/2017    Procedure: HAMMERTOE CORRECTION R 2 ;  Surgeon: Niranjan Child DPM;  [facility];  Service:     TOE OSTEOTOMY Left 11/24/2020    Procedure: LEFT HT CORRECTION TOE;  Surgeon: Niranjan Child DPM;  [facility];  Service: Podiatry    TONSILLECTOMY  1962     The following portions of the patient's history were reviewed and updated as appropriate: allergies, current medications, past family history, past medical history, past social history, past surgical history, and problem list.    ROS:  Review of Systems   Constitutional: Negative for activity change. Fatigue  HENT: Negative for trouble swallowing.    Respiratory: Negative for shortness of breath.    Cardiovascular: Negative for chest pain, edema  Gastrointestinal: Negative for abdominal pain, nausea and vomiting, acid reflux, constipation/diarrhea  Endocrine: negative for heat /cold intolerance  Genitourinary: Negative for difficulty urinating.   Musculoskeletal: Negative for gait problem and myalgias.   Psychiatric/Behavioral: Negative for behavioral problems including anxiety /depression  Objective:  /82 (BP Location: Left arm, Patient Position: Sitting, Cuff Size: Adult)   Pulse 86   Resp 18   Ht  "6' 1.75\" (1.873 m)   Wt (!) 140 kg (309 lb 9.6 oz)   SpO2 96%   BMI 40.02 kg/m²   Constitutional: Well-developed, well-nourished and Obese Body mass index is 40.02 kg/m².. Alert, cooperative.  HEENT: No conjunctival injection.    Pulmonary: No increased work of breathing or signs of respiratory distress.   CV: Well-perfused, Regular rate and rhythm   Vascular: open wound on foot  GI: Abdomen obese, Non-distended.   MSK: no sarcopenia noted   Neuro: Oriented to person, place and time. Normal Speech. Walks with crutches  Psych: Normal affect and mood. Normal thought process, no delusions     Labs and Imaging  Recent labs and imaging have been personally reviewed.  Lab Results   Component Value Date    WBC 6.42 11/20/2023    HGB 13.1 11/20/2023    HCT 40.9 11/20/2023    MCV 87 11/20/2023     11/20/2023     Lab Results   Component Value Date     10/06/2015    SODIUM 137 11/20/2023    K 4.6 11/20/2023    CL 98 11/20/2023    CO2 30 11/20/2023    ANIONGAP 7 10/06/2015    AGAP 9 11/20/2023    BUN 28 (H) 11/20/2023    CREATININE 1.21 11/20/2023    GLUC 155 (H) 11/29/2022    GLUF 151 (H) 11/20/2023    CALCIUM 10.3 (H) 11/20/2023    AST 34 11/20/2023    ALT 41 11/20/2023    ALKPHOS 55 11/20/2023    PROT 6.8 10/06/2015    TP 7.6 11/20/2023    BILITOT 0.44 10/06/2015    TBILI 0.43 11/20/2023    EGFR 61 11/20/2023     Lab Results   Component Value Date    HGBA1C 8.4 (H) 11/20/2023     No results found for: \"[id number]\", \"TSH\"  Lab Results   Component Value Date    CHOLESTEROL 142 11/20/2023     Lab Results   Component Value Date    HDL 29 (L) 11/20/2023     Lab Results   Component Value Date    TRIG 590 (H) 11/20/2023     Lab Results   Component Value Date    LDLCALC  11/20/2023      Comment:      Calculated LDL invalid, triglycerides >400 mg/dl  This screening LDL is a calculated result.   It does not have the accuracy of the Direct Measured LDL in the monitoring of patients with hyperlipidemia and/or statin " therapy.   Direct Measure LDL (FFW677) must be ordered separately in these patients.

## 2023-12-21 ENCOUNTER — OFFICE VISIT (OUTPATIENT)
Dept: WOUND CARE | Facility: CLINIC | Age: 68
End: 2023-12-21
Payer: MEDICARE

## 2023-12-21 VITALS
DIASTOLIC BLOOD PRESSURE: 70 MMHG | HEART RATE: 79 BPM | TEMPERATURE: 97.8 F | SYSTOLIC BLOOD PRESSURE: 154 MMHG | RESPIRATION RATE: 18 BRPM

## 2023-12-21 DIAGNOSIS — L97.512 DIABETIC ULCER OF OTHER PART OF RIGHT FOOT ASSOCIATED WITH DIABETES MELLITUS DUE TO UNDERLYING CONDITION, WITH FAT LAYER EXPOSED (HCC): Primary | ICD-10-CM

## 2023-12-21 DIAGNOSIS — E08.621 DIABETIC ULCER OF OTHER PART OF RIGHT FOOT ASSOCIATED WITH DIABETES MELLITUS DUE TO UNDERLYING CONDITION, WITH FAT LAYER EXPOSED (HCC): Primary | ICD-10-CM

## 2023-12-21 PROCEDURE — 11042 DBRDMT SUBQ TIS 1ST 20SQCM/<: CPT | Performed by: PODIATRIST

## 2023-12-21 RX ORDER — LIDOCAINE 40 MG/G
CREAM TOPICAL ONCE
Status: COMPLETED | OUTPATIENT
Start: 2023-12-21 | End: 2023-12-21

## 2023-12-21 RX ADMIN — LIDOCAINE: 40 CREAM TOPICAL at 13:52

## 2023-12-21 NOTE — PROGRESS NOTES
Patient ID: Augustus Coffman is a 68 y.o. male Date of Birth 1955       Chief Complaint   Patient presents with    Follow Up Wound Care Visit     Right foot ulcer       Allergies:  Lisinopril    Diagnosis:  1. Diabetic ulcer of other part of right foot associated with diabetes mellitus due to underlying condition, with fat layer exposed (HCC)  -     lidocaine (LMX) 4 % cream  -     Wound cleansing and dressings Diabetic Ulcer Right;Plantar;Posterior Foot; Future  -     Wound off loading Diabetic Ulcer Right;Plantar;Posterior Foot; Future       Diagnosis ICD-10-CM Associated Orders   1. Diabetic ulcer of other part of right foot associated with diabetes mellitus due to underlying condition, with fat layer exposed (HCC)  E08.621 lidocaine (LMX) 4 % cream    L97.512 Wound cleansing and dressings Diabetic Ulcer Right;Plantar;Posterior Foot     Wound off loading Diabetic Ulcer Right;Plantar;Posterior Foot           Assessment & Plan:  See wound orders.    Diabetic ulcer right foot fat/rosa 2 ulcer right foot-The patient reports really good effort this week.  He is to continue eating fruit, complex carbs and protein in the day.  He also needs to continue to stay off the foot.  It is going to take a long time to get the wound to heal and he should try to avoid anymore setbacks.    Subjective:   The patient states he went to weight management.  He now knows to eat small amounts throughout the day instead of eating late at night and then snacking.  He was also very conscious about NWB and crutch use.          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HCC)    Hypertension    Hypercholesteremia    Hammer toe of right foot    Stasis dermatitis of both legs    Diabetic peripheral neuropathy (HCC)    Gout    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HCC)    Obesity with body mass index 30 or greater     Type 2 diabetes mellitus (HCC)    Retroperitoneal hematoma    Mesenteric lymphadenopathy    Acute blood loss anemia    Heart murmur    GI bleed    Pleural effusion    Chronic venous hypertension (idiopathic) with ulcer of right lower extremity (HCC)    Rash    FINA (acute kidney injury) (HCC)    Stage 2 chronic kidney disease    Hypertensive kidney disease with stage 3 chronic kidney disease (HCC)    Other proteinuria    Obesity, morbid (HCC)    Follicular lymphoma grade I of lymph nodes of multiple sites (HCC)    Vitamin D deficiency    Stage 3a chronic kidney disease (HCC)    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 (HCC)     Past Medical History:   Diagnosis Date    Chronic kidney disease     Diabetes mellitus (HCC)     Follicular lymphoma (HCC)     High cholesterol     Hypertension      Past Surgical History:   Procedure Laterality Date    BUNIONECTOMY Right 11/24/2020    Procedure: RIGHT HAV CORRECTION,;  Surgeon: Niranjan Child DPM;  Location: [facility];  Service: Podiatry    COLONOSCOPY      INCISION AND DRAINAGE OF WOUND Right 10/05/2016    Procedure: INCISION AND DRAINAGE (I&D) EXTREMITY;  Surgeon: Niranjan Child DPM;  Location: [facility];  Service:     IR BIOPSY LYMPH NODE  07/08/2021    IR EMBOLIZATION (SPECIFY VESSEL OR SITE)  07/08/2021    IR PORT PLACEMENT  9/1/2022    PILONIDAL CYST EXCISION      MI CORRECTION HAMMERTOE Right 02/19/2019    Procedure: THIRD HAMMER TOE CORRECTION;  Surgeon: Niranjan Child DPM;  Location: [facility];  Service: Podiatry    MI RMVL MATEO CTR VAD W/SUBQ PORT/ CTR/PRPH INSJ N/A 3/14/2023    Procedure: REMOVAL VENOUS PORT (PORT-A-CATH)IR;  Surgeon: Avelino Vázquez DO;  [facility];  Service: Interventional Radiology    TOE OSTEOTOMY Right 03/14/2017    Procedure: HAMMERTOE CORRECTION R 2 ;  Surgeon: Niranjan Child DPM;  [facility];  Service:     TOE OSTEOTOMY Left 11/24/2020    Procedure: LEFT HT CORRECTION TOE;  Surgeon: Niranjan Child DPM;  [facility];   Service: Podiatry    TONSILLECTOMY  1963     Social History     Socioeconomic History    Marital status: /Civil Union     Spouse name: None    Number of children: None    Years of education: None    Highest education level: None   Occupational History    None   Tobacco Use    Smoking status: Never    Smokeless tobacco: Never    Tobacco comments:     Never smoked but exposed to second hand smoke from birth until 1980's   Vaping Use    Vaping status: Never Used   Substance and Sexual Activity    Alcohol use: No    Drug use: No    Sexual activity: Not Currently     Partners: Female     Birth control/protection: Abstinence   Other Topics Concern    None   Social History Narrative    None     Social Determinants of Health     Financial Resource Strain: Not on file   Food Insecurity: Not on file   Transportation Needs: Not on file   Physical Activity: Not on file   Stress: Not on file   Social Connections: Not on file   Intimate Partner Violence: Not on file   Housing Stability: Not on file        Current Outpatient Medications:     amLODIPine (NORVASC) 10 mg tablet, take 1 tablet by mouth once daily AT NOON, Disp: , Rfl:     aspirin 81 mg chewable tablet, Chew 81 mg daily, Disp: , Rfl:     Cholecalciferol 125 MCG (5000 UT) TABS, Take 5,000 Units by mouth in the morning, Disp: , Rfl:     Cholecalciferol 50 MCG (2000 UT) TABS, Take 1 tablet (2,000 Units total) by mouth daily (Patient not taking: Reported on 12/20/2023), Disp: , Rfl:     Empagliflozin (Jardiance) 25 MG TABS, Take 1 tablet (25 mg total) by mouth every morning, Disp: 90 tablet, Rfl: 2    hydrochlorothiazide (HYDRODIURIL) 25 mg tablet, Take 25 mg by mouth daily, Disp: , Rfl:     losartan (COZAAR) 100 MG tablet, Take 1 tablet (100 mg total) by mouth daily, Disp: , Rfl:     metFORMIN (GLUCOPHAGE) 500 mg tablet, Take 1,000 mg by mouth 2 (two) times a day with meals, Disp: , Rfl:     metoprolol succinate (TOPROL-XL) 100 mg 24 hr tablet, Take 100 mg by mouth  every evening, Disp: , Rfl:     Multiple Vitamins-Minerals (Centrum Silver 50+Men) TABS, , Disp: , Rfl:     rosuvastatin (CRESTOR) 40 MG tablet, Take 40 mg by mouth every evening, Disp: , Rfl:     simvastatin (ZOCOR) 40 mg tablet, Take 40 mg by mouth daily at bedtime, Disp: , Rfl:   No current facility-administered medications for this visit.  Family History   Problem Relation Age of Onset    Cancer Father         Leukemia      Review of Systems   Constitutional:  Negative for chills and fever.         Objective:  /70   Pulse 79   Temp 97.8 °F (36.6 °C)   Resp 18     Physical Exam  Neurological:      Mental Status: He is alert.             Wound 09/01/22 Incision Chest Anterior;Right (Active)       Wound 03/14/23 Chest Right (Active)       Wound 09/07/23 Diabetic Ulcer Foot Right;Plantar;Posterior (Active)   Enter Singh score: Singh Grade 1: Partial or full-thickness ulcer (superficial) 12/07/23 1351   Wound Image   12/21/23 1409   Wound Description Pink;Yellow;White 12/21/23 1348   Delisa-wound Assessment Maceration;Callus;Black 12/21/23 1348   Wound Length (cm) 1.4 cm 12/21/23 1348   Wound Width (cm) 2.4 cm 12/21/23 1348   Wound Depth (cm) 0.1 cm 12/21/23 1348   Wound Surface Area (cm^2) 3.36 cm^2 12/21/23 1348   Wound Volume (cm^3) 0.336 cm^3 12/21/23 1348   Calculated Wound Volume (cm^3) 0.34 cm^3 12/21/23 1348   Change in Wound Size % 66 12/21/23 1348   Tunneling in depth located at 1-12 o'clock 10/05/23 1348   Undermining 0.1 10/12/23 1408   Undermining is depth extending from 8 to 9 oclock 10/12/23 1408   Drainage Amount Moderate 12/21/23 1348   Drainage Description Serosanguineous 12/21/23 1348   Non-staged Wound Description Full thickness 12/21/23 1348   Dressing Status Intact 12/21/23 1348                         Debridement   Wound 09/07/23 Diabetic Ulcer Foot Right;Plantar;Posterior    Universal Protocol:  Consent: Verbal consent obtained.  Consent given by: patient  Patient understanding:  patient states understanding of the procedure being performed  Patient identity confirmed: verbally with patient    Debridement Details  Performed by: physician  Debridement type: surgical  Level of debridement: subcutaneous tissue  Pain control: lidocaine 1%      Post-debridement measurements  Length (cm): 1.5  Width (cm): 2.5  Depth (cm): 0.2  Percent debrided: 100%  Surface Area (cm^2): 3.75  Area Debrided (cm^2): 3.75  Volume (cm^3): 0.75    Tissue and other material debrided: subcutaneous tissue  Devitalized tissue debrided: callus and slough  Instrument(s) utilized: curette  Bleeding: medium  Hemostasis obtained with: pressure and silver nitrate  Procedural pain (0-10): insensate  Post-procedural pain: insensate   Response to treatment: procedure was tolerated well                 Wound Instructions:  Orders Placed This Encounter   Procedures    Wound cleansing and dressings Diabetic Ulcer Right;Plantar;Posterior Foot     Wound cleansing and dressings Diabetic Ulcer Right;Plantar;Posterior Foot         Right Foot Wound      Wash your hands with soap and water. Remove old dressing, discard into plastic bag and place in trash. Cleanse the wound with Dakins solution 5 to 10 minute soak then remove wet gauze and pat dry prior to applying a clean dressing. Do not use tissue or cotton balls. Do not scrub the wound.      Shower: no cant get wet in shower      Apply skin prep to skin surrounding wound Place double felt pad to help offload pressure  Apply maxsorb ag (silver alginate) to the foot wound.   Cover with abd pad Secure with buffy and tape   Change dressing every other day or sooner if needed for drainage     The above was completed today at the wound center     Standing Status:   Future     Standing Expiration Date:   12/21/2024    Wound off loading Diabetic Ulcer Right;Plantar;Posterior Foot     Off Loading    Keep weight and pressure off wound at all times. Wear off loading shoe and pad to help reduce  "pressure. Felt pad around wound to off load     Use crutches to keep weight off     Standing Status:   Future     Standing Expiration Date:   12/21/2024    Debridement     This order was created via procedure documentation         Susie Luu DPM      Portions of the record may have been created with voice recognition software. Occasional wrong word or \"sound a like\" substitutions may have occurred due to the inherent limitations of voice recognition software. Read the chart carefully and recognize, using context, where substitutions have occurred.     "

## 2023-12-21 NOTE — PATIENT INSTRUCTIONS
Orders Placed This Encounter   Procedures    Wound cleansing and dressings Diabetic Ulcer Right;Plantar;Posterior Foot     Wound cleansing and dressings Diabetic Ulcer Right;Plantar;Posterior Foot         Right Foot Wound      Wash your hands with soap and water. Remove old dressing, discard into plastic bag and place in trash. Cleanse the wound with Dakins solution 5 to 10 minute soak then remove wet gauze and pat dry prior to applying a clean dressing. Do not use tissue or cotton balls. Do not scrub the wound.      Shower: no cant get wet in shower      Apply skin prep to skin surrounding wound Place double felt pad to help offload pressure  Apply maxsorb ag (silver alginate) to the foot wound.   Cover with abd pad Secure with buffy and tape   Change dressing every other day or sooner if needed for drainage     The above was completed today at the wound center     Standing Status:   Future     Standing Expiration Date:   12/21/2024    Wound off loading Diabetic Ulcer Right;Plantar;Posterior Foot     Off Loading    Keep weight and pressure off wound at all times. Wear off loading shoe and pad to help reduce pressure. Felt pad around wound to off load     Use crutches to keep weight off     Standing Status:   Future     Standing Expiration Date:   12/21/2024

## 2023-12-29 ENCOUNTER — TELEPHONE (OUTPATIENT)
Dept: NEPHROLOGY | Facility: CLINIC | Age: 68
End: 2023-12-29

## 2023-12-29 NOTE — TELEPHONE ENCOUNTER
Patient had an appointment schedule with Dr. Heath for 04/15/2024 that needed to be reschedule due to Dr. Heath will be on hospital call that day. I left message on patient s answering machine stating that this appointment was reschedule to 04/22/2024 @ 3:30 and to please call the office to confirm. Letter send to patient

## 2024-01-02 ENCOUNTER — OFFICE VISIT (OUTPATIENT)
Dept: BARIATRICS | Facility: CLINIC | Age: 69
End: 2024-01-02

## 2024-01-02 VITALS — WEIGHT: 300 LBS | HEIGHT: 74 IN | BODY MASS INDEX: 38.5 KG/M2

## 2024-01-02 DIAGNOSIS — R63.5 ABNORMAL WEIGHT GAIN: Primary | ICD-10-CM

## 2024-01-02 PROCEDURE — WMPRO12

## 2024-01-02 PROCEDURE — RECHECK

## 2024-01-02 NOTE — PROGRESS NOTES
"Weight Management Medical Nutrition Assessment  Wilmer was here today as a new start in the [facility] Program.  Today he weighs 300 lbs with a goal to lose 75 lbs. Since seeing the provider a week and a half ago, he has lost almost 10 lbs.  He is no longer skipping meals, and has been eating smaller meals/snacks more frequently.  He has increased his water intake and has cut out juice.  Discussed portion sizes and food logging.  He reports that food logging is something that he struggles with and realizes that he will most likely not stick to it.  Recommend following a portion size and my plate as a guide at each meal.  Also provided him with a sample meal plan. He is currently addressing a wound on his foot, and is not able to exercise per his wound specialist. Recommend that he start exercise when his doctor advises.     Patient seen by Medical Provider in past 6 months:  yes  Requested to schedule appointment with Medical Provider: No      Anthropometric Measurements  Start Weight (#): 300  Current Weight (#): 300  TBW % Change from start weight:0%  Ideal Body Weight (#):184  Goal Weight (#): to lose 75 lbs  Highest: 340   Lowest: 220 (\"years ago\")    Weight Loss History  Previous weight loss attempts: Commercial Programs (Weight Watchers, eblizz, etc.)  FAD Diets (Cabbage soup, Grapefruit, Cleanse, etc.)  Self Created Diets (Portion Control, Healthy Food Choices, etc.)    Food and Nutrition Related History    Food Recall  Breakfast:egg whites    Snack:bar  Lunch:nuts, yogurt  Snack:  Dinner: air fried chicken with fruit   Snack:      Beverages: water  Volume of beverage intake: 32 oz    Weekends: Same  Cravings: juice  Trouble area of day:mid day    Frequency of Eating out: irregularly  Food restrictions:none  Cooking: self   Food Shopping: self    Physical Activity Intake  Activity:none  Frequency: none  Physical limitations/barriers to exercise: foot wound    Estimated Needs    [address] [employer] " Needs: BMR : 2190   1-2# loss weekly sedentary:  1628 - 2128             1-2# loss weekly lightly active:2011 - 2501  Maintenance calories for sedentary activity level: 2628  Protein:100 - 125      (1.2-1.5g/kg IBW)  Fluid: 98     (35mL/kg IBW)    Nutrition Diagnosis  Yes;    Overweight/obesity  related to Excess energy intake as evidenced by  BMI more than normative standard for age and sex (obesity-grade III 40+)       Nutrition Intervention    Nutrition Prescription  Calories:1600 - 1800  Protein:100 - 125  Fluid:98      Nutrition Education:    Healthy Core Manual  Calorie controlled menu  Lean protein food choices  Healthy snack options  Food journaling tips      Nutrition Counseling:  Strategies: meal planning, portion sizes, healthy snack choices, hydration, fiber intake, protein intake, exercise, food journal      Monitoring and Evaluation:  Evaluation criteria:  Energy Intake  Meet protein needs  Maintain adequate hydration  Monitor weekly weight  Meal planning/preparation  Food journal   Decreased portions at mealtimes and snacks  Physical activity     Barriers to learning:none  Readiness to change: Action:  (Changing behavior)  Comprehension: good  Expected Compliance: good

## 2024-01-03 ENCOUNTER — CLINICAL SUPPORT (OUTPATIENT)
Dept: BARIATRICS | Facility: CLINIC | Age: 69
End: 2024-01-03

## 2024-01-03 VITALS — WEIGHT: 302 LBS | BODY MASS INDEX: 38.76 KG/M2 | HEIGHT: 74 IN

## 2024-01-03 DIAGNOSIS — R63.5 ABNORMAL WEIGHT GAIN: Primary | ICD-10-CM

## 2024-01-03 PROCEDURE — RECHECK

## 2024-01-03 NOTE — PROGRESS NOTES
Received notification that patient only had one appointment scheduled at infusion center  Message sent to infusion  and appointment requests signed  [JVD] : no jugular venous distention  [Normal Breath Sounds] : Normal breath sounds [Normal Rate and Rhythm] : normal rate and rhythm [2+] : left 2+ [Ankle Swelling (On Exam)] : not present [Varicose Veins Of Lower Extremities] : bilaterally [Ankle Swelling On The Left] : moderate [] : not present [de-identified] : well appearing [de-identified] : wnl [FreeTextEntry1] : CEAP C-2 tender varicosities b/l

## 2024-01-04 ENCOUNTER — OFFICE VISIT (OUTPATIENT)
Dept: WOUND CARE | Facility: CLINIC | Age: 69
End: 2024-01-04
Payer: MEDICARE

## 2024-01-04 VITALS
RESPIRATION RATE: 18 BRPM | TEMPERATURE: 97.8 F | SYSTOLIC BLOOD PRESSURE: 135 MMHG | HEART RATE: 76 BPM | DIASTOLIC BLOOD PRESSURE: 65 MMHG

## 2024-01-04 DIAGNOSIS — E08.621 DIABETIC ULCER OF OTHER PART OF RIGHT FOOT ASSOCIATED WITH DIABETES MELLITUS DUE TO UNDERLYING CONDITION, WITH FAT LAYER EXPOSED (HCC): Primary | ICD-10-CM

## 2024-01-04 DIAGNOSIS — L97.512 DIABETIC ULCER OF OTHER PART OF RIGHT FOOT ASSOCIATED WITH DIABETES MELLITUS DUE TO UNDERLYING CONDITION, WITH FAT LAYER EXPOSED (HCC): Primary | ICD-10-CM

## 2024-01-04 PROCEDURE — 11042 DBRDMT SUBQ TIS 1ST 20SQCM/<: CPT | Performed by: PODIATRIST

## 2024-01-04 RX ORDER — LIDOCAINE 40 MG/G
CREAM TOPICAL ONCE
Status: COMPLETED | OUTPATIENT
Start: 2024-01-04 | End: 2024-01-04

## 2024-01-04 RX ADMIN — LIDOCAINE 1 APPLICATION: 40 CREAM TOPICAL at 13:46

## 2024-01-04 NOTE — PROGRESS NOTES
Patient ID: Augustus Coffman is a 68 y.o. male Date of Birth 1955       Chief Complaint   Patient presents with    Follow Up Wound Care Visit     Right foot wound       Allergies:  Lisinopril    Diagnosis:  1. Diabetic ulcer of other part of right foot associated with diabetes mellitus due to underlying condition, with fat layer exposed (HCC)  -     lidocaine (LMX) 4 % cream  -     Wound cleansing and dressings Diabetic Ulcer Right;Plantar;Posterior Foot; Future  -     Wound off loading Diabetic Ulcer Right;Plantar;Posterior Foot; Future       Diagnosis ICD-10-CM Associated Orders   1. Diabetic ulcer of other part of right foot associated with diabetes mellitus due to underlying condition, with fat layer exposed (HCC)  E08.621 lidocaine (LMX) 4 % cream    L97.512 Wound cleansing and dressings Diabetic Ulcer Right;Plantar;Posterior Foot     Wound off loading Diabetic Ulcer Right;Plantar;Posterior Foot           Assessment & Plan:  See wound orders.    Diabetic ulcer right foot fat/rosa 2 ulcer right foot.-The patient was encouraged to keep up the good work with his diet and BS control.  He should stay off the foot more.  We will consider biologic tissue if the patient can truly stay off the foot and repeat A1c is lower.  Follow up one week.      Subjective:   The patient is doing well with instructions for weight management.  His blood sugar has been as low as 124.  He is eating 6 times a day and making sure to eat breakfast and protein.  He hasn't been off the foot as much as recommended but tries his best.        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HCC)    Hypertension    Hypercholesteremia    Hammer toe of right foot    Stasis dermatitis of both legs    Diabetic peripheral neuropathy (HCC)    Gout    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HCC)    Obesity with body mass index 30 or  greater    Type 2 diabetes mellitus (HCC)    Retroperitoneal hematoma    Mesenteric lymphadenopathy    Acute blood loss anemia    Heart murmur    GI bleed    Pleural effusion    Chronic venous hypertension (idiopathic) with ulcer of right lower extremity (HCC)    Rash    FINA (acute kidney injury) (HCC)    Stage 2 chronic kidney disease    Hypertensive kidney disease with stage 3 chronic kidney disease (HCC)    Other proteinuria    Obesity, morbid (HCC)    Follicular lymphoma grade I of lymph nodes of multiple sites (HCC)    Vitamin D deficiency    Stage 3a chronic kidney disease (HCC)    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 (HCC)     Past Medical History:   Diagnosis Date    Chronic kidney disease     Diabetes mellitus (HCC)     Follicular lymphoma (HCC)     High cholesterol     Hypertension      Past Surgical History:   Procedure Laterality Date    BUNIONECTOMY Right 11/24/2020    Procedure: RIGHT HAV CORRECTION,;  Surgeon: Niranjan Child DPM;  [facility];  Service: Podiatry    COLONOSCOPY      INCISION AND DRAINAGE OF WOUND Right 10/05/2016    Procedure: INCISION AND DRAINAGE (I&D) EXTREMITY;  Surgeon: Niranjan Child DPM;  [facility];  Service:     IR BIOPSY LYMPH NODE  07/08/2021    IR EMBOLIZATION (SPECIFY VESSEL OR SITE)  07/08/2021    IR PORT PLACEMENT  9/1/2022    PILONIDAL CYST EXCISION      [place] CORRECTION HAMMERTOE Right 02/19/2019    Procedure: THIRD HAMMER TOE CORRECTION;  Surgeon: Niranjan Child DPM;  [facility];  Service: Podiatry    [place] RMVL MATEO CTR VAD W/SUBQ PORT/ CTR/PRPH INSJ N/A 3/14/2023    Procedure: REMOVAL VENOUS PORT (PORT-A-CATH)IR;  Surgeon: Avelino Vázquez DO;  [facility];  Service: Interventional Radiology    TOE OSTEOTOMY Right 03/14/2017    Procedure: HAMMERTOE CORRECTION R 2 ;  Surgeon: Niranjan Child DPM;  [facility];  Service:     TOE OSTEOTOMY Left 11/24/2020    Procedure: LEFT HT CORRECTION TOE;  Surgeon: Niranjan Child DPM;  [facility];  Service: Podiatry    TONSILLECTOMY  1963     Social History     Socioeconomic History    Marital status: /Civil Union     Spouse name: None    Number of children: None    Years of education: None    Highest education level: None   Occupational History    None   Tobacco Use    Smoking status: Never    Smokeless tobacco: Never    Tobacco comments:     Never smoked but exposed to second hand smoke from birth until 1980's   Vaping Use    Vaping status: Never Used   Substance and Sexual Activity    Alcohol use: No    Drug use: No    Sexual activity: Not Currently     Partners: Female     Birth control/protection: Abstinence   Other Topics Concern    None   Social History Narrative    None     Social Determinants of Health     Financial Resource Strain: Not on file   Food Insecurity: Not on file   Transportation Needs: Not on file   Physical Activity: Not on file   Stress: Not on file   Social Connections: Not on file   Intimate Partner Violence: Not on file   Housing Stability: Not on file        Current Outpatient Medications:     amLODIPine (NORVASC) 10 mg tablet, take 1 tablet by mouth once daily AT NOON, Disp: , Rfl:     aspirin 81 mg chewable tablet, Chew 81 mg daily, Disp: , Rfl:     Cholecalciferol 125 MCG (5000 UT) TABS, Take 5,000 Units by mouth in the morning, Disp: , Rfl:     Cholecalciferol 50 MCG (2000 UT) TABS, Take 1 tablet (2,000 Units total) by mouth daily (Patient not taking: Reported on 12/20/2023), Disp: , Rfl:     Empagliflozin (Jardiance) 25 MG TABS, Take 1 tablet (25 mg total) by mouth every morning, Disp: 90 tablet, Rfl: 2    hydrochlorothiazide (HYDRODIURIL) 25 mg tablet, Take 25 mg by mouth daily, Disp: , Rfl:     losartan (COZAAR) 100 MG tablet, Take 1 tablet (100 mg total) by mouth daily, Disp: , Rfl:     metFORMIN (GLUCOPHAGE) 500 mg tablet, Take 1,000 mg by mouth 2 (two) times a day with meals, Disp: , Rfl:     metoprolol succinate (TOPROL-XL) 100 mg 24 hr tablet, Take 100  mg by mouth every evening, Disp: , Rfl:     Multiple Vitamins-Minerals (Centrum Silver 50+Men) TABS, , Disp: , Rfl:     rosuvastatin (CRESTOR) 40 MG tablet, Take 40 mg by mouth every evening, Disp: , Rfl:     simvastatin (ZOCOR) 40 mg tablet, Take 40 mg by mouth daily at bedtime, Disp: , Rfl:   No current facility-administered medications for this visit.  Family History   Problem Relation Age of Onset    Cancer Father         Leukemia      Review of Systems   Constitutional:  Negative for chills and fever.         Objective:  /65   Pulse 76   Temp 97.8 °F (36.6 °C)   Resp 18     Physical Exam  Neurological:      Mental Status: He is alert.             Wound 09/01/22 Incision Chest Anterior;Right (Active)       Wound 03/14/23 Chest Right (Active)       Wound 09/07/23 Diabetic Ulcer Foot Right;Plantar;Posterior (Active)   Enter Singh score: Singh Grade 1: Partial or full-thickness ulcer (superficial) 01/04/24 1343   Wound Image   01/04/24 1355   Wound Description Pink;Yellow;White 01/04/24 1343   Delisa-wound Assessment Maceration;Callus;Black 01/04/24 1343   Wound Length (cm) 1.1 cm 01/04/24 1343   Wound Width (cm) 2.5 cm 01/04/24 1343   Wound Depth (cm) 0.1 cm 01/04/24 1343   Wound Surface Area (cm^2) 2.75 cm^2 01/04/24 1343   Wound Volume (cm^3) 0.275 cm^3 01/04/24 1343   Calculated Wound Volume (cm^3) 0.28 cm^3 01/04/24 1343   Change in Wound Size % 72 01/04/24 1343   Tunneling in depth located at 1-12 o'clock 10/05/23 1348   Undermining 0.1 10/12/23 1408   Undermining is depth extending from 8 to 9 oclock 10/12/23 1408   Drainage Amount Moderate 01/04/24 1343   Drainage Description Serosanguineous;Yellow 01/04/24 1343   Non-staged Wound Description Full thickness 01/04/24 1343   Dressing Status Intact 01/04/24 1343                         Debridement   Wound 09/07/23 Diabetic Ulcer Foot Right;Plantar;Posterior    Universal Protocol:  Consent: Verbal consent obtained.  Patient understanding: patient  states understanding of the procedure being performed  Patient identity confirmed: verbally with patient    Debridement Details  Performed by: physician  Debridement type: surgical  Level of debridement: subcutaneous tissue  Pain control: lidocaine 1%      Post-debridement measurements  Length (cm): 1  Width (cm): 2.5  Depth (cm): 0.2  Percent debrided: 100%  Surface Area (cm^2): 2.5  Area Debrided (cm^2): 2.5  Volume (cm^3): 0.5    Tissue and other material debrided: subcutaneous tissue  Devitalized tissue debrided: slough  Instrument(s) utilized: curette  Bleeding: medium  Hemostasis obtained with: pressure and silver nitrate  Procedural pain (0-10): insensate  Post-procedural pain: insensate   Response to treatment: procedure was tolerated well                 Wound Instructions:  Orders Placed This Encounter   Procedures    Wound cleansing and dressings Diabetic Ulcer Right;Plantar;Posterior Foot     Right Foot Wound      Wash your hands with soap and water. Remove old dressing, discard into plastic bag and place in trash. Cleanse the wound with Dakins solution 5 to 10 minute soak then remove wet gauze and pat dry prior to applying a clean dressing. Do not use tissue or cotton balls. Do not scrub the wound.      Shower: no cant get wet in shower      Apply skin prep to skin surrounding wound Place double felt pad to help offload pressure  Apply maxsorb ag (silver alginate) to the foot wound.   Cover with abd pad Secure with buffy and tape   Change dressing every other day or sooner if needed for drainage     The above was completed today at the wound center     Standing Status:   Future     Standing Expiration Date:   1/4/2025    Wound off loading Diabetic Ulcer Right;Plantar;Posterior Foot     Keep weight and pressure off wound at all times. Wear off loading shoe and pad to help reduce pressure. Felt pad around wound to off load      Use crutches to keep weight off     Standing Status:   Future     Standing  "Expiration Date:   1/4/2025         Susie Luu DPM      Portions of the record may have been created with voice recognition software. Occasional wrong word or \"sound a like\" substitutions may have occurred due to the inherent limitations of voice recognition software. Read the chart carefully and recognize, using context, where substitutions have occurred.     "

## 2024-01-04 NOTE — PATIENT INSTRUCTIONS
Orders Placed This Encounter   Procedures    Wound cleansing and dressings Diabetic Ulcer Right;Plantar;Posterior Foot     Right Foot Wound      Wash your hands with soap and water. Remove old dressing, discard into plastic bag and place in trash. Cleanse the wound with Dakins solution 5 to 10 minute soak then remove wet gauze and pat dry prior to applying a clean dressing. Do not use tissue or cotton balls. Do not scrub the wound.      Shower: no cant get wet in shower      Apply skin prep to skin surrounding wound Place double felt pad to help offload pressure  Apply maxsorb ag (silver alginate) to the foot wound.   Cover with abd pad Secure with buffy and tape   Change dressing every other day or sooner if needed for drainage     The above was completed today at the wound center     Standing Status:   Future     Standing Expiration Date:   1/4/2025    Wound off loading Diabetic Ulcer Right;Plantar;Posterior Foot     Keep weight and pressure off wound at all times. Wear off loading shoe and pad to help reduce pressure. Felt pad around wound to off load      Use crutches to keep weight off     Standing Status:   Future     Standing Expiration Date:   1/4/2025

## 2024-01-10 ENCOUNTER — CLINICAL SUPPORT (OUTPATIENT)
Dept: BARIATRICS | Facility: CLINIC | Age: 69
End: 2024-01-10

## 2024-01-10 VITALS — HEIGHT: 74 IN | WEIGHT: 298.4 LBS | BODY MASS INDEX: 38.3 KG/M2

## 2024-01-10 DIAGNOSIS — R63.5 ABNORMAL WEIGHT GAIN: Primary | ICD-10-CM

## 2024-01-10 PROCEDURE — RECHECK

## 2024-01-11 ENCOUNTER — OFFICE VISIT (OUTPATIENT)
Dept: WOUND CARE | Facility: CLINIC | Age: 69
End: 2024-01-11
Payer: MEDICARE

## 2024-01-11 VITALS
RESPIRATION RATE: 20 BRPM | SYSTOLIC BLOOD PRESSURE: 148 MMHG | HEART RATE: 77 BPM | TEMPERATURE: 98.1 F | DIASTOLIC BLOOD PRESSURE: 74 MMHG

## 2024-01-11 DIAGNOSIS — L97.512 DIABETIC ULCER OF OTHER PART OF RIGHT FOOT ASSOCIATED WITH DIABETES MELLITUS DUE TO UNDERLYING CONDITION, WITH FAT LAYER EXPOSED (HCC): Primary | ICD-10-CM

## 2024-01-11 DIAGNOSIS — E08.621 DIABETIC ULCER OF OTHER PART OF RIGHT FOOT ASSOCIATED WITH DIABETES MELLITUS DUE TO UNDERLYING CONDITION, WITH FAT LAYER EXPOSED (HCC): Primary | ICD-10-CM

## 2024-01-11 PROCEDURE — 11042 DBRDMT SUBQ TIS 1ST 20SQCM/<: CPT | Performed by: PODIATRIST

## 2024-01-11 RX ORDER — LIDOCAINE 40 MG/G
CREAM TOPICAL ONCE
Status: COMPLETED | OUTPATIENT
Start: 2024-01-11 | End: 2024-01-11

## 2024-01-11 RX ADMIN — LIDOCAINE: 40 CREAM TOPICAL at 14:07

## 2024-01-11 NOTE — PROGRESS NOTES
Patient ID: Augustus Coffman is a 68 y.o. male Date of Birth 1955       Chief Complaint   Patient presents with    Follow Up Wound Care Visit     Right Foot Wound       Allergies:  Lisinopril    Diagnosis:  1. Diabetic ulcer of other part of right foot associated with diabetes mellitus due to underlying condition, with fat layer exposed (HCC)  -     lidocaine (LMX) 4 % cream  -     Wound cleansing and dressings Diabetic Ulcer Right;Plantar;Posterior Foot; Future  -     Wound off loading Diabetic Ulcer Right;Plantar;Posterior Foot; Future       Diagnosis ICD-10-CM Associated Orders   1. Diabetic ulcer of other part of right foot associated with diabetes mellitus due to underlying condition, with fat layer exposed (HCC)  E08.621 lidocaine (LMX) 4 % cream    L97.512 Wound cleansing and dressings Diabetic Ulcer Right;Plantar;Posterior Foot     Wound off loading Diabetic Ulcer Right;Plantar;Posterior Foot           Assessment & Plan:  See wound orders.    Diabetic ulcer right foot to fat.  Patient is encouraged to continue with weight management with better blood sugar control.  This will help with wound healing.  There is less callus build up which may be due to the excellent off-loading with thick felt.  Patient is advised if he will stay off the foot the wound should get smaller as the depth has improved with padding and better diet.  Patient will do better this week with use of the crutches at all times.    Subjective:   The patient has lost 14 pounds.  His BS is in the 120s.  He has not stayed off the foot as much as he should due to work.        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HCC)    Hypertension    Hypercholesteremia    Hammer toe of right foot    Stasis dermatitis of both legs    Diabetic peripheral neuropathy (HCC)    Gout    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HCC)     Obesity with body mass index 30 or greater    Type 2 diabetes mellitus (HCC)    Retroperitoneal hematoma    Mesenteric lymphadenopathy    Acute blood loss anemia    Heart murmur    GI bleed    Pleural effusion    Chronic venous hypertension (idiopathic) with ulcer of right lower extremity (HCC)    Rash    FINA (acute kidney injury) (HCC)    Stage 2 chronic kidney disease    Hypertensive kidney disease with stage 3 chronic kidney disease (HCC)    Other proteinuria    Obesity, morbid (HCC)    Follicular lymphoma grade I of lymph nodes of multiple sites (HCC)    Vitamin D deficiency    Stage 3a chronic kidney disease (HCC)    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 (HCC)     Past Medical History:   Diagnosis Date    Chronic kidney disease     Diabetes mellitus (HCC)     Follicular lymphoma (HCC)     High cholesterol     Hypertension      Past Surgical History:   Procedure Laterality Date    BUNIONECTOMY Right 11/24/2020    Procedure: RIGHT HAV CORRECTION,;  Surgeon: Niranjan Child DPM;  [facility];  Service: Podiatry    COLONOSCOPY      INCISION AND DRAINAGE OF WOUND Right 10/05/2016    Procedure: INCISION AND DRAINAGE (I&D) EXTREMITY;  Surgeon: Niranjan Child DPM;  [facility];  Service:     IR BIOPSY LYMPH NODE  07/08/2021    IR EMBOLIZATION (SPECIFY VESSEL OR SITE)  07/08/2021    IR PORT PLACEMENT  9/1/2022    PILONIDAL CYST EXCISION      [place] CORRECTION HAMMERTOE Right 02/19/2019    Procedure: THIRD HAMMER TOE CORRECTION;  Surgeon: Niranjan Child DPM;  [facility];  Service: Podiatry    [place] RMVL MATEO CTR VAD W/SUBQ PORT/ CTR/PRPH INSJ N/A 3/14/2023    Procedure: REMOVAL VENOUS PORT (PORT-A-CATH)IR;  Surgeon: Avelino Vázquez DO;  [facility];  Service: Interventional Radiology    TOE OSTEOTOMY Right 03/14/2017    Procedure: HAMMERTOE CORRECTION R 2 ;  Surgeon: Niranjan Child DPM;  [facility];  Service:     TOE OSTEOTOMY Left 11/24/2020    Procedure: LEFT HT CORRECTION TOE;  Surgeon:  Niranjan Child DPM;  [facility];  Service: Podiatry    TONSILLECTOMY  1963     Social History     Socioeconomic History    Marital status: /Civil Union     Spouse name: None    Number of children: None    Years of education: None    Highest education level: None   Occupational History    None   Tobacco Use    Smoking status: Never    Smokeless tobacco: Never    Tobacco comments:     Never smoked but exposed to second hand smoke from birth until 1980's   Vaping Use    Vaping status: Never Used   Substance and Sexual Activity    Alcohol use: No    Drug use: No    Sexual activity: Not Currently     Partners: Female     Birth control/protection: Abstinence   Other Topics Concern    None   Social History Narrative    None     Social Determinants of Health     Financial Resource Strain: Not on file   Food Insecurity: Not on file   Transportation Needs: Not on file   Physical Activity: Not on file   Stress: Not on file   Social Connections: Not on file   Intimate Partner Violence: Not on file   Housing Stability: Not on file        Current Outpatient Medications:     amLODIPine (NORVASC) 10 mg tablet, take 1 tablet by mouth once daily AT NOON, Disp: , Rfl:     aspirin 81 mg chewable tablet, Chew 81 mg daily, Disp: , Rfl:     Cholecalciferol 125 MCG (5000 UT) TABS, Take 5,000 Units by mouth in the morning, Disp: , Rfl:     Cholecalciferol 50 MCG (2000 UT) TABS, Take 1 tablet (2,000 Units total) by mouth daily (Patient not taking: Reported on 12/20/2023), Disp: , Rfl:     Empagliflozin (Jardiance) 25 MG TABS, Take 1 tablet (25 mg total) by mouth every morning, Disp: 90 tablet, Rfl: 2    hydrochlorothiazide (HYDRODIURIL) 25 mg tablet, Take 25 mg by mouth daily, Disp: , Rfl:     losartan (COZAAR) 100 MG tablet, Take 1 tablet (100 mg total) by mouth daily, Disp: , Rfl:     metFORMIN (GLUCOPHAGE) 500 mg tablet, Take 1,000 mg by mouth 2 (two) times a day with meals, Disp: , Rfl:     metoprolol succinate (TOPROL-XL)  100 mg 24 hr tablet, Take 100 mg by mouth every evening, Disp: , Rfl:     Multiple Vitamins-Minerals (Centrum Silver 50+Men) TABS, , Disp: , Rfl:     rosuvastatin (CRESTOR) 40 MG tablet, Take 40 mg by mouth every evening, Disp: , Rfl:     simvastatin (ZOCOR) 40 mg tablet, Take 40 mg by mouth daily at bedtime, Disp: , Rfl:   No current facility-administered medications for this visit.  Family History   Problem Relation Age of Onset    Cancer Father         Leukemia      Review of Systems   Constitutional:  Negative for chills and fever.         Objective:  /74   Pulse 77   Temp 98.1 °F (36.7 °C)   Resp 20     Physical Exam  Neurological:      Mental Status: He is alert.             Wound 09/01/22 Incision Chest Anterior;Right (Active)       Wound 03/14/23 Chest Right (Active)       Wound 09/07/23 Diabetic Ulcer Foot Right;Plantar;Posterior (Active)   Enter Singh score: Singh Grade 1: Partial or full-thickness ulcer (superficial) 01/04/24 1343   Wound Image   01/11/24 1416   Wound Description Pink;Yellow;White 01/11/24 1359   Delisa-wound Assessment Callus;Brown 01/11/24 1359   Wound Length (cm) 1.9 cm 01/11/24 1359   Wound Width (cm) 2.5 cm 01/11/24 1359   Wound Depth (cm) 0.1 cm 01/11/24 1359   Wound Surface Area (cm^2) 4.75 cm^2 01/11/24 1359   Wound Volume (cm^3) 0.475 cm^3 01/11/24 1359   Calculated Wound Volume (cm^3) 0.48 cm^3 01/11/24 1359   Change in Wound Size % 52 01/11/24 1359   Tunneling 1 in depth located at 1-12 o'clock 10/05/23 1348   Undermining 1 0.1 10/12/23 1408   Undermining 1 is depth extending from 8 to 9 oclock 10/12/23 1408   Drainage Amount Moderate 01/11/24 1359   Drainage Description Serosanguineous;Yellow 01/11/24 1359   Non-staged Wound Description Full thickness 01/11/24 1359   Dressing Status Intact 01/11/24 1359                         Debridement   Wound 09/07/23 Diabetic Ulcer Foot Right;Plantar;Posterior    Universal Protocol:  Consent: Verbal consent obtained.  Consent  given by: patient  Patient understanding: patient states understanding of the procedure being performed  Patient identity confirmed: verbally with patient    Debridement Details  Performed by: physician  Debridement type: surgical  Level of debridement: subcutaneous tissue  Pain control: lidocaine 1%      Post-debridement measurements  Length (cm): 2  Width (cm): 2.6  Depth (cm): 0.1  Percent debrided: 100%  Surface Area (cm^2): 5.2  Area Debrided (cm^2): 5.2  Volume (cm^3): 0.52    Tissue and other material debrided: subcutaneous tissue  Devitalized tissue debrided: callus, slough and eschar  Instrument(s) utilized: curette  Bleeding: medium  Hemostasis obtained with: pressure and silver nitrate  Procedural pain (0-10): insensate  Post-procedural pain: insensate   Response to treatment: procedure was tolerated well                 Wound Instructions:  Orders Placed This Encounter   Procedures    Wound cleansing and dressings Diabetic Ulcer Right;Plantar;Posterior Foot     Right Foot Wound     Wash your hands with soap and water. Remove old dressing, discard into plastic bag and place in trash. Cleanse the wound with Dakins solution 5 to 10 minute soak then remove wet gauze and pat dry prior to applying a clean dressing. Do not use tissue or cotton balls. Do not scrub the wound.     Shower: no cant get wet in shower    Apply skin prep to skin surrounding wound   Place double felt pad to help offload pressure.  Apply maxsorb ag (silver alginate) to the foot wound.   Cover with abd pad Secure with buffy and tape.     Change dressing every other day or sooner if needed for drainage.    The above was completed today at the wound center.     Standing Status:   Future     Standing Expiration Date:   1/11/2025    Wound off loading Diabetic Ulcer Right;Plantar;Posterior Foot     Keep weight and pressure off wound at all times. Wear off loading shoe and pad to help reduce pressure. Felt pad around wound to off load Use  "crutches to keep weight off     Standing Status:   Future     Standing Expiration Date:   1/11/2025         Susie Luu DPM      Portions of the record may have been created with voice recognition software. Occasional wrong word or \"sound a like\" substitutions may have occurred due to the inherent limitations of voice recognition software. Read the chart carefully and recognize, using context, where substitutions have occurred.     "

## 2024-01-11 NOTE — PATIENT INSTRUCTIONS
Orders Placed This Encounter   Procedures    Wound cleansing and dressings Diabetic Ulcer Right;Plantar;Posterior Foot     Right Foot Wound     Wash your hands with soap and water. Remove old dressing, discard into plastic bag and place in trash. Cleanse the wound with Dakins solution 5 to 10 minute soak then remove wet gauze and pat dry prior to applying a clean dressing. Do not use tissue or cotton balls. Do not scrub the wound.     Shower: no cant get wet in shower    Apply skin prep to skin surrounding wound   Place double felt pad to help offload pressure.  Apply maxsorb ag (silver alginate) to the foot wound.   Cover with abd pad Secure with buffy and tape.     Change dressing every other day or sooner if needed for drainage.    The above was completed today at the wound center.     Standing Status:   Future     Standing Expiration Date:   1/11/2025    Wound off loading Diabetic Ulcer Right;Plantar;Posterior Foot     Keep weight and pressure off wound at all times. Wear off loading shoe and pad to help reduce pressure. Felt pad around wound to off load Use crutches to keep weight off     Standing Status:   Future     Standing Expiration Date:   1/11/2025

## 2024-01-17 ENCOUNTER — CLINICAL SUPPORT (OUTPATIENT)
Dept: BARIATRICS | Facility: CLINIC | Age: 69
End: 2024-01-17

## 2024-01-17 VITALS — WEIGHT: 295.2 LBS | BODY MASS INDEX: 37.88 KG/M2 | HEIGHT: 74 IN

## 2024-01-17 DIAGNOSIS — R63.5 ABNORMAL WEIGHT GAIN: Primary | ICD-10-CM

## 2024-01-17 PROCEDURE — RECHECK

## 2024-01-18 ENCOUNTER — OFFICE VISIT (OUTPATIENT)
Dept: WOUND CARE | Facility: CLINIC | Age: 69
End: 2024-01-18
Payer: MEDICARE

## 2024-01-18 VITALS
HEART RATE: 70 BPM | DIASTOLIC BLOOD PRESSURE: 76 MMHG | RESPIRATION RATE: 18 BRPM | TEMPERATURE: 97.2 F | SYSTOLIC BLOOD PRESSURE: 154 MMHG

## 2024-01-18 DIAGNOSIS — E08.621 DIABETIC ULCER OF OTHER PART OF RIGHT FOOT ASSOCIATED WITH DIABETES MELLITUS DUE TO UNDERLYING CONDITION, WITH FAT LAYER EXPOSED (HCC): Primary | ICD-10-CM

## 2024-01-18 DIAGNOSIS — L97.512 DIABETIC ULCER OF OTHER PART OF RIGHT FOOT ASSOCIATED WITH DIABETES MELLITUS DUE TO UNDERLYING CONDITION, WITH FAT LAYER EXPOSED (HCC): Primary | ICD-10-CM

## 2024-01-18 PROCEDURE — 11042 DBRDMT SUBQ TIS 1ST 20SQCM/<: CPT | Performed by: PODIATRIST

## 2024-01-18 RX ORDER — LIDOCAINE HYDROCHLORIDE 40 MG/ML
5 SOLUTION TOPICAL ONCE
Status: COMPLETED | OUTPATIENT
Start: 2024-01-18 | End: 2024-01-18

## 2024-01-18 RX ADMIN — LIDOCAINE HYDROCHLORIDE 5 ML: 40 SOLUTION TOPICAL at 14:09

## 2024-01-18 NOTE — PATIENT INSTRUCTIONS
Orders Placed This Encounter   Procedures    Wound cleansing and dressings Diabetic Ulcer Right;Plantar;Posterior Foot     Right Foot Wound      Wash your hands with soap and water. Remove old dressing, discard into plastic bag and place in trash. Cleanse the wound with Dakins solution 5 to 10 minute soak then remove wet gauze and pat dry prior to applying a clean dressing. Do not use tissue or cotton balls. Do not scrub the wound.      Shower: no cant get wet in shower     Apply skin prep to skin surrounding wound   Place double felt pad to help offload pressure.  Apply maxsorb ag (silver alginate) to the foot wound.   Cover with abd pad Secure with buffy and tape.      Change dressing every other day or sooner if needed for drainage.     The above was completed today at the wound center.     Standing Status:   Future     Standing Expiration Date:   1/18/2025    Wound off loading Diabetic Ulcer Right;Plantar;Posterior Foot     Keep weight and pressure off wound at all times. Wear off loading shoe and pad to help reduce pressure. Felt pad around wound to off load Use crutches to keep weight off     Standing Status:   Future     Standing Expiration Date:   1/18/2025

## 2024-01-18 NOTE — PROGRESS NOTES
Patient ID: Augustus Coffman is a 68 y.o. male Date of Birth 1955       Chief Complaint   Patient presents with    Follow Up Wound Care Visit     Right great toe wound       Allergies:  Lisinopril    Diagnosis:  1. Diabetic ulcer of other part of right foot associated with diabetes mellitus due to underlying condition, with fat layer exposed (HCC)  -     Wound cleansing and dressings Diabetic Ulcer Right;Plantar;Posterior Foot; Future  -     Wound off loading Diabetic Ulcer Right;Plantar;Posterior Foot; Future  -     lidocaine (XYLOCAINE) 4 % topical solution 5 mL       Diagnosis ICD-10-CM Associated Orders   1. Diabetic ulcer of other part of right foot associated with diabetes mellitus due to underlying condition, with fat layer exposed (HCC)  E08.621 Wound cleansing and dressings Diabetic Ulcer Right;Plantar;Posterior Foot    L97.512 Wound off loading Diabetic Ulcer Right;Plantar;Posterior Foot     lidocaine (XYLOCAINE) 4 % topical solution 5 mL           Assessment & Plan:  See wound orders.    E08.621/rosa 2 ulcer right foot.  The patient is encouraged to continue current protocols.  He is doing really well with diet and weight loss in the absence of the ability to walk on the foot.  Even though he shoveled a little, the patient has very little callus and scab in comparison to prior visits and the ulcer has gotten smaller.      Subjective:   This is a 69 yo male with DM and DN.  Patient has been on weight management and continues to lose weight.  BS today was 119.  He is trying to stay off the foot but did do a little shoveling of snow this week.        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HCC)    Hypertension    Hypercholesteremia    Hammer toe of right foot    Stasis dermatitis of both legs    Diabetic peripheral neuropathy (HCC)    Gout    Malignant neoplasm of  mesentery (HCC)    Obesity with body mass index 30 or greater    Type 2 diabetes mellitus (HCC)    Retroperitoneal hematoma    Mesenteric lymphadenopathy    Acute blood loss anemia    Heart murmur    GI bleed    Pleural effusion    Chronic venous hypertension (idiopathic) with ulcer of right lower extremity (HCC)    Rash    FINA (acute kidney injury) (HCC)    Stage 2 chronic kidney disease    Hypertensive kidney disease with stage 3 chronic kidney disease (HCC)    Other proteinuria    Obesity, morbid (HCC)    Follicular lymphoma grade I of lymph nodes of multiple sites (HCC)    Vitamin D deficiency    Stage 3a chronic kidney disease (HCC)    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 (HCC)     Past Medical History:   Diagnosis Date    Chronic kidney disease     Diabetes mellitus (HCC)     Follicular lymphoma (HCC)     High cholesterol     Hypertension      Past Surgical History:   Procedure Laterality Date    BUNIONECTOMY Right 11/24/2020    Procedure: RIGHT HAV CORRECTION,;  Surgeon: Niranjan Child DPM;  Location: [facility];  Service: Podiatry    COLONOSCOPY      INCISION AND DRAINAGE OF WOUND Right 10/05/2016    Procedure: INCISION AND DRAINAGE (I&D) EXTREMITY;  Surgeon: Niranjan Child DPM;  Location: [facility];  Service:     IR BIOPSY LYMPH NODE  07/08/2021    IR EMBOLIZATION (SPECIFY VESSEL OR SITE)  07/08/2021    IR PORT PLACEMENT  9/1/2022    PILONIDAL CYST EXCISION      OH CORRECTION HAMMERTOE Right 02/19/2019    Procedure: THIRD HAMMER TOE CORRECTION;  Surgeon: Niranjan Child DPM;  Location: [facility];  Service: Podiatry    OH RMVL MATEO CTR VAD W/SUBQ PORT/ CTR/PRPH INSJ N/A 3/14/2023    Procedure: REMOVAL VENOUS PORT (PORT-A-CATH)IR;  Surgeon: Avelino Vázquez DO;  [facility];  Service: Interventional Radiology    TOE OSTEOTOMY Right 03/14/2017    Procedure: HAMMERTOE CORRECTION R 2 ;  Surgeon: Niranjan Child DPM;  [facility];  Service:     TOE OSTEOTOMY Left 11/24/2020    Procedure: LEFT HT  CORRECTION TOE;  Surgeon: Niranjan Child DPM;  [facility];  Service: Podiatry    TONSILLECTOMY  1963     Social History     Socioeconomic History    Marital status: /Civil Union     Spouse name: Not on file    Number of children: Not on file    Years of education: Not on file    Highest education level: Not on file   Occupational History    Not on file   Tobacco Use    Smoking status: Never    Smokeless tobacco: Never    Tobacco comments:     Never smoked but exposed to second hand smoke from birth until 1980's   Vaping Use    Vaping status: Never Used   Substance and Sexual Activity    Alcohol use: No    Drug use: No    Sexual activity: Not Currently     Partners: Female     Birth control/protection: Abstinence   Other Topics Concern    Not on file   Social History Narrative    Not on file     Social Determinants of Health     Financial Resource Strain: Not on file   Food Insecurity: Not on file   Transportation Needs: Not on file   Physical Activity: Not on file   Stress: Not on file   Social Connections: Not on file   Intimate Partner Violence: Not on file   Housing Stability: Not on file        Current Outpatient Medications:     amLODIPine (NORVASC) 10 mg tablet, take 1 tablet by mouth once daily AT NOON, Disp: , Rfl:     aspirin 81 mg chewable tablet, Chew 81 mg daily, Disp: , Rfl:     Cholecalciferol 125 MCG (5000 UT) TABS, Take 5,000 Units by mouth in the morning, Disp: , Rfl:     Cholecalciferol 50 MCG (2000 UT) TABS, Take 1 tablet (2,000 Units total) by mouth daily (Patient not taking: Reported on 12/20/2023), Disp: , Rfl:     Empagliflozin (Jardiance) 25 MG TABS, Take 1 tablet (25 mg total) by mouth every morning, Disp: 90 tablet, Rfl: 2    hydrochlorothiazide (HYDRODIURIL) 25 mg tablet, Take 25 mg by mouth daily, Disp: , Rfl:     losartan (COZAAR) 100 MG tablet, Take 1 tablet (100 mg total) by mouth daily, Disp: , Rfl:     metFORMIN (GLUCOPHAGE) 500 mg tablet, Take 1,000 mg by mouth 2  (two) times a day with meals, Disp: , Rfl:     metoprolol succinate (TOPROL-XL) 100 mg 24 hr tablet, Take 100 mg by mouth every evening, Disp: , Rfl:     Multiple Vitamins-Minerals (Centrum Silver 50+Men) TABS, , Disp: , Rfl:     rosuvastatin (CRESTOR) 40 MG tablet, Take 40 mg by mouth every evening, Disp: , Rfl:     simvastatin (ZOCOR) 40 mg tablet, Take 40 mg by mouth daily at bedtime, Disp: , Rfl:   No current facility-administered medications for this visit.  Family History   Problem Relation Age of Onset    Cancer Father         Leukemia      Review of Systems   Constitutional:  Negative for chills and fever.         Objective:  /76   Pulse 70   Temp (!) 97.2 °F (36.2 °C)   Resp 18     Physical Exam  Neurological:      Mental Status: He is alert.             Wound 09/01/22 Incision Chest Anterior;Right (Active)       Wound 03/14/23 Chest Right (Active)       Wound 09/07/23 Diabetic Ulcer Foot Right;Plantar;Posterior (Active)   Enter Singh score: Singh Grade 1: Partial or full-thickness ulcer (superficial) 01/18/24 1358   Wound Image   01/18/24 1358   Wound Description Pink;Yellow;White 01/18/24 1358   Delisa-wound Assessment Callus;Brown 01/18/24 1358   Wound Length (cm) 0.9 cm 01/18/24 1358   Wound Width (cm) 2.1 cm 01/18/24 1358   Wound Depth (cm) 0.1 cm 01/18/24 1358   Wound Surface Area (cm^2) 1.89 cm^2 01/18/24 1358   Wound Volume (cm^3) 0.189 cm^3 01/18/24 1358   Calculated Wound Volume (cm^3) 0.19 cm^3 01/18/24 1358   Change in Wound Size % 81 01/18/24 1358   Tunneling 1 in depth located at 1-12 o'clock 10/05/23 1348   Undermining 1 0.1 10/12/23 1408   Undermining 1 is depth extending from 8 to 9 oclock 10/12/23 1408   Drainage Amount Moderate 01/18/24 1358   Drainage Description Serosanguineous 01/18/24 1358   Non-staged Wound Description Full thickness 01/18/24 1358   Dressing Status Intact 01/18/24 1358                         Debridement   Wound 09/07/23 Diabetic Ulcer Foot  Right;Plantar;Posterior    Universal Protocol:  Consent: Verbal consent obtained.  Consent given by: patient  Patient understanding: patient states understanding of the procedure being performed  Patient identity confirmed: verbally with patient    Debridement Details  Performed by: physician  Debridement type: surgical  Level of debridement: subcutaneous tissue  Pain control: lidocaine 1%      Post-debridement measurements  Length (cm): 1  Width (cm): 2.1  Depth (cm): 0.2  Percent debrided: 100%  Surface Area (cm^2): 2.1  Area Debrided (cm^2): 2.1  Volume (cm^3): 0.42    Tissue and other material debrided: subcutaneous tissue  Devitalized tissue debrided: callus, slough and eschar  Instrument(s) utilized: curette  Bleeding: medium  Hemostasis obtained with: pressure and silver nitrate  Procedural pain (0-10): insensate  Post-procedural pain: insensate   Response to treatment: procedure was tolerated well                 Wound Instructions:  Orders Placed This Encounter   Procedures    Wound cleansing and dressings Diabetic Ulcer Right;Plantar;Posterior Foot     Right Foot Wound      Wash your hands with soap and water. Remove old dressing, discard into plastic bag and place in trash. Cleanse the wound with Dakins solution 5 to 10 minute soak then remove wet gauze and pat dry prior to applying a clean dressing. Do not use tissue or cotton balls. Do not scrub the wound.      Shower: no cant get wet in shower     Apply skin prep to skin surrounding wound   Place double felt pad to help offload pressure.  Apply maxsorb ag (silver alginate) to the foot wound.   Cover with abd pad Secure with buffy and tape.      Change dressing every other day or sooner if needed for drainage.     The above was completed today at the wound center.     Standing Status:   Future     Standing Expiration Date:   1/18/2025    Wound off loading Diabetic Ulcer Right;Plantar;Posterior Foot     Keep weight and pressure off wound at all times.  "Wear off loading shoe and pad to help reduce pressure. Felt pad around wound to off load Use crutches to keep weight off     Standing Status:   Future     Standing Expiration Date:   1/18/2025    Debridement     This order was created via procedure documentation         Susie Luu DPM      Portions of the record may have been created with voice recognition software. Occasional wrong word or \"sound a like\" substitutions may have occurred due to the inherent limitations of voice recognition software. Read the chart carefully and recognize, using context, where substitutions have occurred.     "

## 2024-01-22 ENCOUNTER — OFFICE VISIT (OUTPATIENT)
Dept: BARIATRICS | Facility: CLINIC | Age: 69
End: 2024-01-22

## 2024-01-22 VITALS — WEIGHT: 293 LBS | BODY MASS INDEX: 37.6 KG/M2 | HEIGHT: 74 IN

## 2024-01-22 DIAGNOSIS — R63.5 ABNORMAL WEIGHT GAIN: Primary | ICD-10-CM

## 2024-01-22 PROCEDURE — RECHECK

## 2024-01-22 NOTE — PROGRESS NOTES
"Weight Management Medical Nutrition Assessment  Wilmer was here today for his 2 week follow-up in the [facility].  Today he weighs 293 lbs giving him a loss of 7 lbs since starting 2 weeks ago, and approximately 20 lbs since seeing the provider.   He is no longer skipping meals, and has been eating smaller meals/snacks more frequently.  He has increased his water intake and has cut out juice.  He reports that he \"loves juice\" and will have a very small glass occasionally to calm his craving but mostly drinks water.   He is currently addressing a wound on his foot, and is not able to exercise per his wound specialist but is reports that it is improving. He reports that his blood sugar has been ranging from 119 - 122 which is an improvement. Discussed food logging - he is not really interested in doing that at this time.  He has also been taking protein shakes with him to work and in the car in case his work schedule changes then he has something to eat/drink.       Patient seen by Medical Provider in past 6 months:  yes  Requested to schedule appointment with Medical Provider: No        Anthropometric Measurements  Start Weight (#): 300  Current Weight (#): 293  TBW % Change from start weight:2%  Ideal Body Weight (#):184  Goal Weight (#): to lose 75 lbs  Highest: 340   Lowest: 220 (\"years ago\")     Weight Loss History  Previous weight loss attempts: Commercial Programs (Weight Watchers, Donna Yash, etc.)  FAD Diets (Cabbage soup, Grapefruit, Cleanse, etc.)  Self Created Diets (Portion Control, Healthy Food Choices, etc.)     Food and Nutrition Related History     Food Recall  Breakfast:egg whites                Snack:bar  Lunch:nuts, yogurt or shake  Snack:  Dinner: vegetables, 6- 8 oz porotein  Snack:         Beverages: water  Volume of beverage intake: 32 oz     Weekends: Same  Cravings: juice  Trouble area of day:mid day     Frequency of Eating out: irregularly  Food restrictions:none  Cooking: self "   Food Shopping: self     Physical Activity Intake  Activity:none  Frequency: none  Physical limitations/barriers to exercise: foot wound     Estimated Needs     [employer] Needs: BMR : 2170   1-2# loss weekly sedentary:  1604 - 2104             1-2# loss weekly lightly active:1983- 2483  Maintenance calories for sedentary activity level: 2604  Protein:100 - 125      (1.2-1.5g/kg IBW)  Fluid: 98     (35mL/kg IBW)     Nutrition Diagnosis  Yes;    Overweight/obesity  related to Excess energy intake as evidenced by  BMI more than normative standard for age and sex (obesity-grade III 40+)     Nutrition Intervention     Nutrition Prescription  Calories:1600 - 1800  Protein:100 - 125  Fluid:98        Nutrition Education:    [facility] Manual  Calorie controlled menu  Lean protein food choices  Healthy snack options  Food journaling tips        Nutrition Counseling:  Strategies: meal planning, portion sizes, healthy snack choices, hydration, fiber intake, protein intake, exercise, food journal        Monitoring and Evaluation:  Evaluation criteria:  Energy Intake  Meet protein needs  Maintain adequate hydration  Monitor weekly weight  Meal planning/preparation  Food journal   Decreased portions at mealtimes and snacks  Physical activity      Barriers to learning:none  Readiness to change: Action:  (Changing behavior)  Comprehension: good  Expected Compliance: good

## 2024-01-24 ENCOUNTER — CLINICAL SUPPORT (OUTPATIENT)
Dept: BARIATRICS | Facility: CLINIC | Age: 69
End: 2024-01-24

## 2024-01-24 VITALS — WEIGHT: 289.4 LBS | BODY MASS INDEX: 37.14 KG/M2 | HEIGHT: 74 IN

## 2024-01-24 DIAGNOSIS — R63.5 ABNORMAL WEIGHT GAIN: Primary | ICD-10-CM

## 2024-01-24 PROCEDURE — RECHECK

## 2024-01-25 ENCOUNTER — OFFICE VISIT (OUTPATIENT)
Dept: WOUND CARE | Facility: CLINIC | Age: 69
End: 2024-01-25
Payer: MEDICARE

## 2024-01-25 VITALS — TEMPERATURE: 97.6 F | DIASTOLIC BLOOD PRESSURE: 56 MMHG | HEART RATE: 80 BPM | SYSTOLIC BLOOD PRESSURE: 115 MMHG

## 2024-01-25 DIAGNOSIS — L97.511 DIABETIC ULCER OF OTHER PART OF RIGHT FOOT ASSOCIATED WITH DIABETES MELLITUS DUE TO UNDERLYING CONDITION, LIMITED TO BREAKDOWN OF SKIN (HCC): ICD-10-CM

## 2024-01-25 DIAGNOSIS — E11.42 DIABETIC PERIPHERAL NEUROPATHY (HCC): ICD-10-CM

## 2024-01-25 DIAGNOSIS — L97.512 DIABETIC ULCER OF OTHER PART OF RIGHT FOOT ASSOCIATED WITH DIABETES MELLITUS DUE TO UNDERLYING CONDITION, WITH FAT LAYER EXPOSED (HCC): Primary | ICD-10-CM

## 2024-01-25 DIAGNOSIS — E08.621 DIABETIC ULCER OF OTHER PART OF RIGHT FOOT ASSOCIATED WITH DIABETES MELLITUS DUE TO UNDERLYING CONDITION, WITH FAT LAYER EXPOSED (HCC): Primary | ICD-10-CM

## 2024-01-25 DIAGNOSIS — E08.621 DIABETIC ULCER OF OTHER PART OF RIGHT FOOT ASSOCIATED WITH DIABETES MELLITUS DUE TO UNDERLYING CONDITION, LIMITED TO BREAKDOWN OF SKIN (HCC): ICD-10-CM

## 2024-01-25 PROCEDURE — 11042 DBRDMT SUBQ TIS 1ST 20SQCM/<: CPT | Performed by: PODIATRIST

## 2024-01-25 NOTE — PATIENT INSTRUCTIONS
Orders Placed This Encounter   Procedures    Wound cleansing and dressings     Right Foot Wound      Wash your hands with soap and water. Remove old dressing, discard into plastic bag and place in trash. Cleanse the wound with Dakins solution 5 to 10 minute soak then remove wet gauze and pat dry prior to applying a clean dressing. Do not use tissue or cotton balls. Do not scrub the wound.      Shower: no cant get wet in shower     Apply skin prep to skin surrounding wound   Place double felt pad to help offload pressure.  Apply Purachol ag to the foot wound.   Cover with abd pad Secure with buffy and tape.      Change dressing every other day or sooner if needed for drainage.     Standing Status:   Future     Standing Expiration Date:   1/25/2025    Wound off loading     · Wound off loading Diabetic Ulcer Right;Plantar;Posterior Foot      Keep weight and pressure off wound at all times. Wear off loading shoe and pad to help reduce pressure. Felt pad around wound to off load Use crutches to keep weight off     Standing Status:   Future     Standing Expiration Date:   1/25/2025

## 2024-01-25 NOTE — PROGRESS NOTES
Patient ID: Augustus Coffman is a 68 y.o. male Date of Birth 1955       Chief Complaint   Patient presents with    Follow Up Wound Care Visit       Allergies:  Lisinopril    Diagnosis:  1. Diabetic ulcer of other part of right foot associated with diabetes mellitus due to underlying condition, with fat layer exposed (HCC)  -     Wound cleansing and dressings; Future  -     Wound off loading; Future    2. Diabetic ulcer of other part of right foot associated with diabetes mellitus due to underlying condition, limited to breakdown of skin (HCC)  -     Wound cleansing and dressings; Future  -     Wound off loading; Future    3. Diabetic peripheral neuropathy (HCC)  -     Wound cleansing and dressings; Future  -     Wound off loading; Future       Diagnosis ICD-10-CM Associated Orders   1. Diabetic ulcer of other part of right foot associated with diabetes mellitus due to underlying condition, with fat layer exposed (HCC)  E08.621 Wound cleansing and dressings    L97.512 Wound off loading      2. Diabetic ulcer of other part of right foot associated with diabetes mellitus due to underlying condition, limited to breakdown of skin (HCC)  E08.621 Wound cleansing and dressings    L97.511 Wound off loading      3. Diabetic peripheral neuropathy (HCC)  E11.42 Wound cleansing and dressings     Wound off loading           Assessment & Plan:  See wound orders.    Diabetic ulcer right foot to fat/rosa 2 ulcer right foot.  Patient need to stay off the foot more and make sure the felt is positioned correctly on the foot to avoid perimeter trauma and bleeding.  Change from silver alginate to silver collagen to promote wound healing.  Patient is referred to Dr. Gwen Lazo DO for primary care and to hopefully soon recheck his A1c.  Follow up one week.    Subjective:   The patient notes his weight is good and his BS was 114 today.  It trended down and has been low for weeks now.  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  Also, there is no current plan to recheck his A1c because his PCP is moving to [place].  He now needs a new practitioner.        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HCC)    Hypertension    Hypercholesteremia    Hammer toe of right foot    Stasis dermatitis of both legs    Diabetic peripheral neuropathy (HCC)    Gout    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HCC)    Obesity with body mass index 30 or greater    Type 2 diabetes mellitus (HCC)    Retroperitoneal hematoma    Mesenteric lymphadenopathy    Acute blood loss anemia    Heart murmur    GI bleed    Pleural effusion    Chronic venous hypertension (idiopathic) with ulcer of right lower extremity (HCC)    Rash    FINA (acute kidney injury) (HCC)    Stage 2 chronic kidney disease    Hypertensive kidney disease with stage 3 chronic kidney disease (HCC)    Other proteinuria    Obesity, morbid (HCC)    Follicular lymphoma grade I of lymph nodes of multiple sites (HCC)    Vitamin D deficiency    Stage 3a chronic kidney disease (HCC)    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 (HCC)     Past Medical History:   Diagnosis Date    Chronic kidney disease     Diabetes mellitus (HCC)     Follicular lymphoma (HCC)     High cholesterol     Hypertension      Past Surgical History:   Procedure Laterality Date    BUNIONECTOMY Right 11/24/2020    Procedure: RIGHT HAV CORRECTION,;  Surgeon: Niranjan Child DPM;  [facility];  Service: Podiatry    COLONOSCOPY      INCISION AND DRAINAGE OF WOUND Right 10/05/2016    Procedure: INCISION AND DRAINAGE (I&D) EXTREMITY;  Surgeon: Niranjan Child DPM;  [facility];  Service:     IR BIOPSY LYMPH NODE  07/08/2021    IR EMBOLIZATION (SPECIFY VESSEL OR SITE)  07/08/2021    IR PORT PLACEMENT  9/1/2022    PILONIDAL CYST EXCISION      AL CORRECTION HAMMERTOE Right  02/19/2019    Procedure: THIRD HAMMER TOE CORRECTION;  Surgeon: Niranjan Child DPM;  [facility];  Service: Podiatry    IL RMVL MATEO CTR VAD W/SUBQ PORT/ CTR/PRPH INSJ N/A 3/14/2023    Procedure: REMOVAL VENOUS PORT (PORT-A-CATH)IR;  Surgeon: Avelino Vázquez DO;  [facility];  Service: Interventional Radiology    TOE OSTEOTOMY Right 03/14/2017    Procedure: HAMMERTOE CORRECTION R 2 ;  Surgeon: Niranjan Child DPM;  [facility];  Service:     TOE OSTEOTOMY Left 11/24/2020    Procedure: LEFT HT CORRECTION TOE;  Surgeon: Niranjan Child DPM;  [facility];  Service: Podiatry    TONSILLECTOMY  1963     Social History     Socioeconomic History    Marital status: /Civil Union     Spouse name: Not on file    Number of children: Not on file    Years of education: Not on file    Highest education level: Not on file   Occupational History    Not on file   Tobacco Use    Smoking status: Never    Smokeless tobacco: Never    Tobacco comments:     Never smoked but exposed to second hand smoke from birth until 1980's   Vaping Use    Vaping status: Never Used   Substance and Sexual Activity    Alcohol use: No    Drug use: No    Sexual activity: Not Currently     Partners: Female     Birth control/protection: Abstinence   Other Topics Concern    Not on file   Social History Narrative    Not on file     Social Determinants of Health     Financial Resource Strain: Not on file   Food Insecurity: Not on file   Transportation Needs: Not on file   Physical Activity: Not on file   Stress: Not on file   Social Connections: Not on file   Intimate Partner Violence: Not on file   Housing Stability: Not on file        Current Outpatient Medications:     amLODIPine (NORVASC) 10 mg tablet, take 1 tablet by mouth once daily AT NOON, Disp: , Rfl:     aspirin 81 mg chewable tablet, Chew 81 mg daily, Disp: , Rfl:     Cholecalciferol 125 MCG (5000 UT) TABS, Take 5,000 Units by mouth in the morning, Disp: ,  Rfl:     Cholecalciferol 50 MCG (2000 UT) TABS, Take 1 tablet (2,000 Units total) by mouth daily (Patient not taking: Reported on 12/20/2023), Disp: , Rfl:     Empagliflozin (Jardiance) 25 MG TABS, Take 1 tablet (25 mg total) by mouth every morning, Disp: 90 tablet, Rfl: 2    hydrochlorothiazide (HYDRODIURIL) 25 mg tablet, Take 25 mg by mouth daily, Disp: , Rfl:     losartan (COZAAR) 100 MG tablet, Take 1 tablet (100 mg total) by mouth daily, Disp: , Rfl:     metFORMIN (GLUCOPHAGE) 500 mg tablet, Take 1,000 mg by mouth 2 (two) times a day with meals, Disp: , Rfl:     metoprolol succinate (TOPROL-XL) 100 mg 24 hr tablet, Take 100 mg by mouth every evening, Disp: , Rfl:     Multiple Vitamins-Minerals (Centrum Silver 50+Men) TABS, , Disp: , Rfl:     rosuvastatin (CRESTOR) 40 MG tablet, Take 40 mg by mouth every evening, Disp: , Rfl:     simvastatin (ZOCOR) 40 mg tablet, Take 40 mg by mouth daily at bedtime, Disp: , Rfl:   Family History   Problem Relation Age of Onset    Cancer Father         Leukemia      Review of Systems   Constitutional:  Negative for chills and fever.         Objective:  /56   Pulse 80   Temp 97.6 °F (36.4 °C)     Physical Exam  Neurological:      Mental Status: He is alert.             Wound 09/01/22 Incision Chest Anterior;Right (Active)       Wound 03/14/23 Chest Right (Active)       Wound 09/07/23 Diabetic Ulcer Foot Right;Plantar;Posterior (Active)   Enter Singh score: Singh Grade 1: Partial or full-thickness ulcer (superficial) 01/18/24 1358   Wound Image   01/25/24 1401   Wound Description Pink;Yellow;White 01/25/24 1347   Delisa-wound Assessment Callus;Brown 01/25/24 1347   Wound Length (cm) 1.4 cm 01/25/24 1347   Wound Width (cm) 2 cm 01/25/24 1347   Wound Depth (cm) 0.2 cm 01/25/24 1347   Wound Surface Area (cm^2) 2.8 cm^2 01/25/24 1347   Wound Volume (cm^3) 0.56 cm^3 01/25/24 1347   Calculated Wound Volume (cm^3) 0.56 cm^3 01/25/24 1347   Change in Wound Size % 44 01/25/24 1344    Tunneling 1 in depth located at 1-12 o'clock 10/05/23 1348   Undermining 1 0.2 01/25/24 1347   Undermining 1 is depth extending from 7-3 01/25/24 1347   Drainage Amount Moderate 01/18/24 1358   Drainage Description Serosanguineous 01/18/24 1358   Non-staged Wound Description Full thickness 01/18/24 1358   Dressing Status Intact 01/18/24 1358                         Debridement   Wound 09/07/23 Diabetic Ulcer Foot Right;Plantar;Posterior    Universal Protocol:  Consent: Verbal consent obtained.  Consent given by: patient  Patient understanding: patient states understanding of the procedure being performed  Patient identity confirmed: verbally with patient    Debridement Details  Performed by: physician  Debridement type: surgical  Level of debridement: subcutaneous tissue  Pain control: lidocaine 1%      Post-debridement measurements  Length (cm): 1.3  Width (cm): 2  Depth (cm): 0.2  Percent debrided: 100%  Surface Area (cm^2): 2.6  Area Debrided (cm^2): 2.6  Volume (cm^3): 0.52    Tissue and other material debrided: subcutaneous tissue  Devitalized tissue debrided: slough  Instrument(s) utilized: curette  Bleeding: medium  Hemostasis obtained with: pressure and silver nitrate  Procedural pain (0-10): insensate  Post-procedural pain: insensate   Response to treatment: procedure was tolerated well                 Wound Instructions:  Orders Placed This Encounter   Procedures    Wound cleansing and dressings     Right Foot Wound      Wash your hands with soap and water. Remove old dressing, discard into plastic bag and place in trash. Cleanse the wound with Dakins solution 5 to 10 minute soak then remove wet gauze and pat dry prior to applying a clean dressing. Do not use tissue or cotton balls. Do not scrub the wound.      Shower: no cant get wet in shower     Apply skin prep to skin surrounding wound   Place double felt pad to help offload pressure.  Apply Purachol ag to the foot wound.   Cover with abd pad Secure  "with buffy and tape.      Change dressing every other day or sooner if needed for drainage.     Standing Status:   Future     Standing Expiration Date:   1/25/2025    Wound off loading     · Wound off loading Diabetic Ulcer Right;Plantar;Posterior Foot      Keep weight and pressure off wound at all times. Wear off loading shoe and pad to help reduce pressure. Felt pad around wound to off load Use crutches to keep weight off     Standing Status:   Future     Standing Expiration Date:   1/25/2025         Susie Luu DPM      Portions of the record may have been created with voice recognition software. Occasional wrong word or \"sound a like\" substitutions may have occurred due to the inherent limitations of voice recognition software. Read the chart carefully and recognize, using context, where substitutions have occurred.     "

## 2024-01-31 NOTE — PROGRESS NOTES
Bariatric Behavioral Health Evaluation    Presenting Problem: 68 year old male ( 1955) here for behavioral health evaluation for [facility]. Patient had initial consult with Dr. Darnell 23. Patient was offered a behavioral health visit as part of the Healthy [facility] Program.    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    Psychiatric/Psychological Treatment Diagnosis: Patient denies any current mental health diagnosis or treatment.     Outpatient Counselor No     Psychiatrist No     Family Constellation: Patient currently lives with his wife, 30 year old daughter, and his 23 year old niece. Also has 2 other adult daughters that live on their own with their significant others.     Trauma/Abuse History:   Patient denies this.    Additional comments/stressors related to family/relationships/peer support: Patient identifies his wife as his support. Patient identifies minimal stressors currently.    Physical/Psychological Assessment:     Appearance: appropriate  Sociability: friendly  Affect: appropriate  Mood: calm  Thought Process: coherent  Speech: normal  Content: no impairment  Orientation: person  Yes , place  Yes , time  Yes , normal attention span  Yes , normal memory  Yes  , and normal judgement  Yes   Insight: emotional  good    Risk Assessment:     none    Risk of Harm to Self or Others: Patient denies SI or HI     Observation:     Interviews: This interview only.    Based on the previous information, the client presents the following risk of harm to self or others: low    Recommendations:   Patient here for behavioral health evaluation for [facility]. Patient had initial consult with Dr. Darnell 23. Patient was offered a behavioral health  visit as part of the Healthy [facility] Program.  Patient currently lives with his wife, 30 year old daughter, and his 23 year old niece. Also has 2 other adult daughters that live on their own with their significant others. Middle daughter is engaged- wedding spring 2025.  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 Patient retired from [employer] in 2008. Works a lot, but really enjoys it. Was skipping meals- wasn't eating until 9pm before. Started eating 3 meals per day recently.  Very goal oriented, so can lose weight, but struggles with maintaining.  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 Parents were very hard working and goal oriented as well.  Struggles with activity due to- currently working with wound care due to having ulcers on his foot that won't heal  Discussed all or nothing thinking and how this can be a barrier to success long term.   Has an anoeism on his aorta and is not allowed to lift over 5 lbs  Drinks sparkling water- 8-10 bottles, reduced soda to 2 diet per week.  When patient had cancer 3 years ago he had a biopsy that damaged his kidney and now has to take medication for it  Struggles with sleep since  Encouraged patient to focus on NSVs and to focus on balance- make self care a priority.  Micheline Goldstein LCSW

## 2024-02-01 ENCOUNTER — CLINICAL SUPPORT (OUTPATIENT)
Dept: BARIATRICS | Facility: CLINIC | Age: 69
End: 2024-02-01

## 2024-02-01 ENCOUNTER — OFFICE VISIT (OUTPATIENT)
Dept: WOUND CARE | Facility: CLINIC | Age: 69
End: 2024-02-01
Payer: MEDICARE

## 2024-02-01 VITALS
SYSTOLIC BLOOD PRESSURE: 157 MMHG | HEART RATE: 68 BPM | RESPIRATION RATE: 18 BRPM | TEMPERATURE: 97.6 F | DIASTOLIC BLOOD PRESSURE: 77 MMHG

## 2024-02-01 DIAGNOSIS — R63.5 ABNORMAL WEIGHT GAIN: Primary | ICD-10-CM

## 2024-02-01 DIAGNOSIS — E08.621 DIABETIC ULCER OF OTHER PART OF RIGHT FOOT ASSOCIATED WITH DIABETES MELLITUS DUE TO UNDERLYING CONDITION, WITH FAT LAYER EXPOSED (HCC): Primary | ICD-10-CM

## 2024-02-01 DIAGNOSIS — L97.512 DIABETIC ULCER OF OTHER PART OF RIGHT FOOT ASSOCIATED WITH DIABETES MELLITUS DUE TO UNDERLYING CONDITION, WITH FAT LAYER EXPOSED (HCC): Primary | ICD-10-CM

## 2024-02-01 PROCEDURE — 11042 DBRDMT SUBQ TIS 1ST 20SQCM/<: CPT | Performed by: PODIATRIST

## 2024-02-01 PROCEDURE — RECHECK

## 2024-02-01 RX ORDER — LIDOCAINE 40 MG/G
CREAM TOPICAL ONCE
Status: COMPLETED | OUTPATIENT
Start: 2024-02-01 | End: 2024-02-01

## 2024-02-01 RX ADMIN — LIDOCAINE 1 APPLICATION: 40 CREAM TOPICAL at 15:16

## 2024-02-01 NOTE — PROGRESS NOTES
Patient ID: Augustus Coffman is a 68 y.o. male Date of Birth 1955       Chief Complaint   Patient presents with    Follow Up Wound Care Visit     Right foot       Allergies:  Lisinopril    Diagnosis:  1. Diabetic ulcer of other part of right foot associated with diabetes mellitus due to underlying condition, with fat layer exposed (HCC)  -     Wound cleansing and dressings Diabetic Ulcer Right;Plantar;Posterior Foot; Future  -     lidocaine (LMX) 4 % cream       Diagnosis ICD-10-CM Associated Orders   1. Diabetic ulcer of other part of right foot associated with diabetes mellitus due to underlying condition, with fat layer exposed (HCC)  E08.621 Wound cleansing and dressings Diabetic Ulcer Right;Plantar;Posterior Foot    L97.512 lidocaine (LMX) 4 % cream           Assessment & Plan:  See wound orders.    Diabetic ulcer right foot to fat/rosa 2.  Patient knows that the WB has been an issue in progression of the wound.  We are waiting to recheck his A1c to see if he is a candidate for biologic dressing.  Patient may also need a TCC or surgery of the first ray.  For now use the crutches to try to maintain complete NWB right foot.  Continue weight management and tight blood sugar control.    Subjective:   Patient had a good week with diet and weight loss.  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HCC)    Hypertension    Hypercholesteremia    Hammer toe of right foot    Stasis dermatitis of both legs    Diabetic peripheral neuropathy (HCC)    Gout    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HCC)    Obesity with body mass index 30 or greater    Type 2 diabetes mellitus (HCC)    Retroperitoneal hematoma    Mesenteric lymphadenopathy    Acute blood loss anemia    Heart murmur    GI bleed    Pleural  effusion    Chronic venous hypertension (idiopathic) with ulcer of right lower extremity (HCC)    Rash    FINA (acute kidney injury) (HCC)    Stage 2 chronic kidney disease    Hypertensive kidney disease with stage 3 chronic kidney disease (HCC)    Other proteinuria    Obesity, morbid (HCC)    Follicular lymphoma grade I of lymph nodes of multiple sites (HCC)    Vitamin D deficiency    Stage 3a chronic kidney disease (HCC)    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 (HCC)     Past Medical History:   Diagnosis Date    Chronic kidney disease     Diabetes mellitus (HCC)     Follicular lymphoma (HCC)     High cholesterol     Hypertension      Past Surgical History:   Procedure Laterality Date    BUNIONECTOMY Right 11/24/2020    Procedure: RIGHT HAV CORRECTION,;  Surgeon: Niranjan Child DPM;  [facility];  Service: Podiatry    COLONOSCOPY      INCISION AND DRAINAGE OF WOUND Right 10/05/2016    Procedure: INCISION AND DRAINAGE (I&D) EXTREMITY;  Surgeon: Niranjan Child DPM;  [facility];  Service:     IR BIOPSY LYMPH NODE  07/08/2021    IR EMBOLIZATION (SPECIFY VESSEL OR SITE)  07/08/2021    IR PORT PLACEMENT  9/1/2022    PILONIDAL CYST EXCISION      AL CORRECTION HAMMERTOE Right 02/19/2019    Procedure: THIRD HAMMER TOE CORRECTION;  Surgeon: Niranjan Child DPM;  [facility];  Service: Podiatry    AL RMVL MATEO CTR VAD W/SUBQ PORT/ CTR/PRPH INSJ N/A 3/14/2023    Procedure: REMOVAL VENOUS PORT (PORT-A-CATH)IR;  Surgeon: Avelino Vázquez DO;  [facility];  Service: Interventional Radiology    TOE OSTEOTOMY Right 03/14/2017    Procedure: HAMMERTOE CORRECTION R 2 ;  Surgeon: Niranjan Child DPM;  [facility];  Service:     TOE OSTEOTOMY Left 11/24/2020    Procedure: LEFT HT CORRECTION TOE;  Surgeon: Niranjan Child DPM;  [facility];  Service: Podiatry    TONSILLECTOMY  1963     Social History     Socioeconomic History    Marital status: /Civil Union     Spouse name: None    Number of  children: None    Years of education: None    Highest education level: None   Occupational History    None   Tobacco Use    Smoking status: Never    Smokeless tobacco: Never    Tobacco comments:     Never smoked but exposed to second hand smoke from birth until 1980's   Vaping Use    Vaping status: Never Used   Substance and Sexual Activity    Alcohol use: No    Drug use: No    Sexual activity: Not Currently     Partners: Female     Birth control/protection: Abstinence   Other Topics Concern    None   Social History Narrative    None     Social Determinants of Health     Financial Resource Strain: Not on file   Food Insecurity: Not on file   Transportation Needs: Not on file   Physical Activity: Not on file   Stress: Not on file   Social Connections: Not on file   Intimate Partner Violence: Not on file   Housing Stability: Not on file        Current Outpatient Medications:     amLODIPine (NORVASC) 10 mg tablet, take 1 tablet by mouth once daily AT NOON, Disp: , Rfl:     aspirin 81 mg chewable tablet, Chew 81 mg daily, Disp: , Rfl:     Cholecalciferol 125 MCG (5000 UT) TABS, Take 5,000 Units by mouth in the morning, Disp: , Rfl:     Cholecalciferol 50 MCG (2000 UT) TABS, Take 1 tablet (2,000 Units total) by mouth daily (Patient not taking: Reported on 12/20/2023), Disp: , Rfl:     Empagliflozin (Jardiance) 25 MG TABS, Take 1 tablet (25 mg total) by mouth every morning, Disp: 90 tablet, Rfl: 2    hydrochlorothiazide (HYDRODIURIL) 25 mg tablet, Take 25 mg by mouth daily, Disp: , Rfl:     losartan (COZAAR) 100 MG tablet, Take 1 tablet (100 mg total) by mouth daily, Disp: , Rfl:     metFORMIN (GLUCOPHAGE) 500 mg tablet, Take 1,000 mg by mouth 2 (two) times a day with meals, Disp: , Rfl:     metoprolol succinate (TOPROL-XL) 100 mg 24 hr tablet, Take 100 mg by mouth every evening, Disp: , Rfl:     Multiple Vitamins-Minerals (Centrum Silver 50+Men) TABS, , Disp: , Rfl:     rosuvastatin (CRESTOR) 40 MG tablet, Take 40 mg by  mouth every evening, Disp: , Rfl:     simvastatin (ZOCOR) 40 mg tablet, Take 40 mg by mouth daily at bedtime, Disp: , Rfl:   No current facility-administered medications for this visit.  Family History   Problem Relation Age of Onset    Cancer Father         Leukemia      Review of Systems   Constitutional:  Negative for chills and fever.         Objective:  /77   Pulse 68   Temp 97.6 °F (36.4 °C)   Resp 18     Physical Exam  Neurological:      Mental Status: He is alert.             Wound 09/01/22 Incision Chest Anterior;Right (Active)       Wound 03/14/23 Chest Right (Active)       Wound 09/07/23 Diabetic Ulcer Foot Right;Plantar;Posterior (Active)   Enter Singh score: Singh Grade 1: Partial or full-thickness ulcer (superficial) 01/18/24 1358   Wound Image   02/01/24 1443   Wound Description Pink 02/01/24 1444   Delisa-wound Assessment Callus 02/01/24 1444   Wound Length (cm) 1.5 cm 02/01/24 1444   Wound Width (cm) 2.1 cm 02/01/24 1444   Wound Depth (cm) 0.1 cm 02/01/24 1444   Wound Surface Area (cm^2) 3.15 cm^2 02/01/24 1444   Wound Volume (cm^3) 0.315 cm^3 02/01/24 1444   Calculated Wound Volume (cm^3) 0.32 cm^3 02/01/24 1444   Change in Wound Size % 68 02/01/24 1444   Tunneling 1 in depth located at 1-12 o'clock 10/05/23 1348   Undermining 1 0.2 02/01/24 1444   Undermining 1 is depth extending from 10-11 02/01/24 1444   Drainage Amount Moderate 02/01/24 1444   Drainage Description Serosanguineous 02/01/24 1444   Non-staged Wound Description Full thickness 01/18/24 1358   Dressing Status Intact 02/01/24 1444                         Debridement    Universal Protocol:  Consent: Verbal consent obtained.  Consent given by: patient  Patient understanding: patient states understanding of the procedure being performed  Patient identity confirmed: verbally with patient    Debridement Details  Performed by: physician  Debridement type: surgical  Level of debridement: subcutaneous tissue  Pain control: lidocaine  "1%      Post-debridement measurements  Length (cm): 1.5  Width (cm): 2.4  Depth (cm): 0.2  Percent debrided: 100%  Surface Area (cm^2): 3.6  Area Debrided (cm^2): 3.6  Volume (cm^3): 0.72    Tissue and other material debrided: subcutaneous tissue  Devitalized tissue debrided: slough  Instrument(s) utilized: curette  Hemostasis obtained with: silver nitrate  Procedural pain (0-10): insensate  Post-procedural pain: insensate   Response to treatment: procedure was tolerated well                 Wound Instructions:  Orders Placed This Encounter   Procedures    Wound cleansing and dressings Diabetic Ulcer Right;Plantar;Posterior Foot     · Wound cleansing and dressings      Right Foot Wound      Wash your hands with soap and water. Remove old dressing, discard into plastic bag and place in trash. Cleanse the wound with Dakins solution 5 to 10 minute soak then remove wet gauze and pat dry prior to applying a clean dressing. Do not use tissue or cotton balls. Do not scrub the wound.      Shower: no cant get wet in shower     Apply skin prep to skin surrounding wound   Place double felt pad to help offload pressure.  Apply Purachol ag to the foot wound.   Cover with abd pad Secure with buffy and tape.     Standing Status:   Future     Standing Expiration Date:   2/1/2025         Susie Luu DPM      Portions of the record may have been created with voice recognition software. Occasional wrong word or \"sound a like\" substitutions may have occurred due to the inherent limitations of voice recognition software. Read the chart carefully and recognize, using context, where substitutions have occurred.     "

## 2024-02-01 NOTE — PATIENT INSTRUCTIONS
Orders Placed This Encounter   Procedures    Wound cleansing and dressings Diabetic Ulcer Right;Plantar;Posterior Foot     · Wound cleansing and dressings      Right Foot Wound      Wash your hands with soap and water. Remove old dressing, discard into plastic bag and place in trash. Cleanse the wound with Dakins solution 5 to 10 minute soak then remove wet gauze and pat dry prior to applying a clean dressing. Do not use tissue or cotton balls. Do not scrub the wound.      Shower: no cant get wet in shower     Apply skin prep to skin surrounding wound   Place double felt pad to help offload pressure.  Apply Purachol ag to the foot wound.   Cover with abd pad Secure with buffy and tape.     Standing Status:   Future     Standing Expiration Date:   2/1/2025

## 2024-02-14 ENCOUNTER — CLINICAL SUPPORT (OUTPATIENT)
Dept: BARIATRICS | Facility: CLINIC | Age: 69
End: 2024-02-14

## 2024-02-14 VITALS — HEIGHT: 74 IN | BODY MASS INDEX: 37.09 KG/M2 | WEIGHT: 289 LBS

## 2024-02-14 DIAGNOSIS — R63.5 ABNORMAL WEIGHT GAIN: Primary | ICD-10-CM

## 2024-02-14 PROCEDURE — RECHECK

## 2024-02-15 ENCOUNTER — OFFICE VISIT (OUTPATIENT)
Dept: WOUND CARE | Facility: CLINIC | Age: 69
End: 2024-02-15
Payer: MEDICARE

## 2024-02-15 VITALS
DIASTOLIC BLOOD PRESSURE: 71 MMHG | HEART RATE: 79 BPM | RESPIRATION RATE: 18 BRPM | TEMPERATURE: 97 F | SYSTOLIC BLOOD PRESSURE: 135 MMHG

## 2024-02-15 DIAGNOSIS — E08.621 DIABETIC ULCER OF OTHER PART OF RIGHT FOOT ASSOCIATED WITH DIABETES MELLITUS DUE TO UNDERLYING CONDITION, WITH FAT LAYER EXPOSED (HCC): Primary | ICD-10-CM

## 2024-02-15 DIAGNOSIS — L97.512 DIABETIC ULCER OF OTHER PART OF RIGHT FOOT ASSOCIATED WITH DIABETES MELLITUS DUE TO UNDERLYING CONDITION, WITH FAT LAYER EXPOSED (HCC): Primary | ICD-10-CM

## 2024-02-15 PROCEDURE — 11042 DBRDMT SUBQ TIS 1ST 20SQCM/<: CPT | Performed by: PODIATRIST

## 2024-02-15 RX ORDER — LIDOCAINE 40 MG/G
CREAM TOPICAL ONCE
Status: COMPLETED | OUTPATIENT
Start: 2024-02-15 | End: 2024-02-15

## 2024-02-15 RX ORDER — PREDNISONE 20 MG/1
40 TABLET ORAL DAILY
COMMUNITY
Start: 2024-02-12

## 2024-02-15 RX ADMIN — LIDOCAINE: 40 CREAM TOPICAL at 13:53

## 2024-02-15 NOTE — PATIENT INSTRUCTIONS
Orders Placed This Encounter   Procedures    Wound cleansing and dressings Diabetic Ulcer Right;Plantar;Posterior Foot     Right Foot Wound     Wash your hands with soap and water. Remove old dressing, discard into plastic bag and place in trash. Cleanse the wound with Dakins solution 5 to 10 minute soak then remove wet gauze and pat dry prior to applying a clean dressing. Do not use tissue or cotton balls. Do not scrub the wound.    Shower: no cant get wet in shower     Apply skin prep to skin surrounding wound.  Place double felt pad to help offload pressure.   Apply Purachol AG to the foot wound.   Cover with abd pad.  Secure with buffy and tape.    Change dressing every other day.     Standing Status:   Future     Standing Expiration Date:   2/15/2025

## 2024-02-15 NOTE — PROGRESS NOTES
Patient ID: Augustus Coffman is a 68 y.o. male Date of Birth 1955       Chief Complaint   Patient presents with    Follow Up Wound Care Visit     Right Foot Wound       Allergies:  Lisinopril    Diagnosis:  1. Diabetic ulcer of other part of right foot associated with diabetes mellitus due to underlying condition, with fat layer exposed (HCC)  -     Wound cleansing and dressings Diabetic Ulcer Right;Plantar;Posterior Foot; Future  -     lidocaine (LMX) 4 % cream       Diagnosis ICD-10-CM Associated Orders   1. Diabetic ulcer of other part of right foot associated with diabetes mellitus due to underlying condition, with fat layer exposed (HCC)  E08.621 Wound cleansing and dressings Diabetic Ulcer Right;Plantar;Posterior Foot    L97.512 lidocaine (LMX) 4 % cream           Assessment & Plan:  See wound orders.    Diabetic ulcer right foot to fat/rosa 2 ulcer right foot.  Patient has 4 days worth of steroids left.  Hopefully as he gets better and the steroids finish, his BS will go down and the wound will improve.  He is also hoping to have an improvement in his overall A1c.  He may be a candidate for biologic tissue application at that point.    Subjective:   The patient notes he got sick and could not stop coughing.  His PCP finally said the patient needed steroids.  He is feeling better but his BS has been a little elevated ever since.  His numbers are around 140 instead of 114.      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HCC)    Hypertension    Hypercholesteremia    Hammer toe of right foot    Stasis dermatitis of both legs    Diabetic peripheral neuropathy (HCC)    Gout    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HCC)    Obesity with body mass index 30 or greater    Type 2 diabetes mellitus (HCC)    Retroperitoneal hematoma    Mesenteric lymphadenopathy    Acute blood loss anemia     Heart murmur    GI bleed    Pleural effusion    Chronic venous hypertension (idiopathic) with ulcer of right lower extremity (HCC)    Rash    FINA (acute kidney injury) (HCC)    Stage 2 chronic kidney disease    Hypertensive kidney disease with stage 3 chronic kidney disease (HCC)    Other proteinuria    Obesity, morbid (HCC)    Follicular lymphoma grade I of lymph nodes of multiple sites (HCC)    Vitamin D deficiency    Stage 3a chronic kidney disease (HCC)    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 (HCC)     Past Medical History:   Diagnosis Date    Chronic kidney disease     Diabetes mellitus (HCC)     Follicular lymphoma (HCC)     High cholesterol     Hypertension      Past Surgical History:   Procedure Laterality Date    BUNIONECTOMY Right 11/24/2020    Procedure: RIGHT HAV CORRECTION,;  Surgeon: Niranjan Child DPM;  [facility];  Service: Podiatry    COLONOSCOPY      INCISION AND DRAINAGE OF WOUND Right 10/05/2016    Procedure: INCISION AND DRAINAGE (I&D) EXTREMITY;  Surgeon: Niranjan Child DPM;  [facility];  Service:     IR BIOPSY LYMPH NODE  07/08/2021    IR EMBOLIZATION (SPECIFY VESSEL OR SITE)  07/08/2021    IR PORT PLACEMENT  9/1/2022    PILONIDAL CYST EXCISION      AK CORRECTION HAMMERTOE Right 02/19/2019    Procedure: THIRD HAMMER TOE CORRECTION;  Surgeon: Niranjan Child DPM;  [facility];  Service: Podiatry    AK RMVL MATEO CTR VAD W/SUBQ PORT/ CTR/PRPH INSJ N/A 3/14/2023    Procedure: REMOVAL VENOUS PORT (PORT-A-CATH)IR;  Surgeon: Avelino Vázquez DO;  [facility];  Service: Interventional Radiology    TOE OSTEOTOMY Right 03/14/2017    Procedure: HAMMERTOE CORRECTION R 2 ;  Surgeon: Niranjan Child DPM;  [facility];  Service:     TOE OSTEOTOMY Left 11/24/2020    Procedure: LEFT HT CORRECTION TOE;  Surgeon: Niranjan Child DPM;  [facility];  Service: Podiatry    TONSILLECTOMY  1963     Social History     Socioeconomic History    Marital status: /Civil  Union     Spouse name: None    Number of children: None    Years of education: None    Highest education level: None   Occupational History    None   Tobacco Use    Smoking status: Never    Smokeless tobacco: Never    Tobacco comments:     Never smoked but exposed to second hand smoke from birth until 1980's   Vaping Use    Vaping status: Never Used   Substance and Sexual Activity    Alcohol use: No    Drug use: No    Sexual activity: Not Currently     Partners: Female     Birth control/protection: Abstinence   Other Topics Concern    None   Social History Narrative    None     Social Determinants of Health     Financial Resource Strain: Not on file   Food Insecurity: Not on file   Transportation Needs: Not on file   Physical Activity: Not on file   Stress: Not on file   Social Connections: Not on file   Intimate Partner Violence: Not on file   Housing Stability: Not on file        Current Outpatient Medications:     predniSONE 20 mg tablet, Take 40 mg by mouth daily, Disp: , Rfl:     amLODIPine (NORVASC) 10 mg tablet, take 1 tablet by mouth once daily AT NOON, Disp: , Rfl:     aspirin 81 mg chewable tablet, Chew 81 mg daily, Disp: , Rfl:     Cholecalciferol 125 MCG (5000 UT) TABS, Take 5,000 Units by mouth in the morning, Disp: , Rfl:     Cholecalciferol 50 MCG (2000 UT) TABS, Take 1 tablet (2,000 Units total) by mouth daily (Patient not taking: Reported on 12/20/2023), Disp: , Rfl:     Empagliflozin (Jardiance) 25 MG TABS, Take 1 tablet (25 mg total) by mouth every morning, Disp: 90 tablet, Rfl: 2    hydrochlorothiazide (HYDRODIURIL) 25 mg tablet, Take 25 mg by mouth daily, Disp: , Rfl:     losartan (COZAAR) 100 MG tablet, Take 1 tablet (100 mg total) by mouth daily, Disp: , Rfl:     metFORMIN (GLUCOPHAGE) 500 mg tablet, Take 1,000 mg by mouth 2 (two) times a day with meals, Disp: , Rfl:     metoprolol succinate (TOPROL-XL) 100 mg 24 hr tablet, Take 100 mg by mouth every evening, Disp: , Rfl:     Multiple  Vitamins-Minerals (Centrum Silver 50+Men) TABS, , Disp: , Rfl:     rosuvastatin (CRESTOR) 40 MG tablet, Take 40 mg by mouth every evening, Disp: , Rfl:     simvastatin (ZOCOR) 40 mg tablet, Take 40 mg by mouth daily at bedtime, Disp: , Rfl:   No current facility-administered medications for this visit.  Family History   Problem Relation Age of Onset    Cancer Father         Leukemia      Review of Systems   Constitutional:  Negative for chills and fever.         Objective:  /71   Pulse 79   Temp (!) 97 °F (36.1 °C)   Resp 18     Physical Exam  Neurological:      Mental Status: He is alert.           Wound 09/01/22 Incision Chest Anterior;Right (Active)       Wound 03/14/23 Chest Right (Active)       Wound 09/07/23 Diabetic Ulcer Foot Right;Plantar;Posterior (Active)   Enter Singh score: Singh Grade 1: Partial or full-thickness ulcer (superficial) 01/18/24 1358   Wound Image   02/15/24 1402   Wound Description Pink;White 02/15/24 1344   Delisa-wound Assessment Callus 02/15/24 1344   Wound Length (cm) 1.5 cm 02/15/24 1344   Wound Width (cm) 1.9 cm 02/15/24 1344   Wound Depth (cm) 0.2 cm 02/15/24 1344   Wound Surface Area (cm^2) 2.85 cm^2 02/15/24 1344   Wound Volume (cm^3) 0.57 cm^3 02/15/24 1344   Calculated Wound Volume (cm^3) 0.57 cm^3 02/15/24 1344   Change in Wound Size % 43 02/15/24 1344   Tunneling 1 in depth located at 1-12 o'clock 10/05/23 1348   Number of underminings 1 02/15/24 1344   Undermining 1 0.4 02/15/24 1344   Undermining 1 is depth extending from 9-12 02/15/24 1344   Drainage Amount Moderate 02/15/24 1344   Drainage Description Serosanguineous 02/15/24 1344   Non-staged Wound Description Full thickness 02/15/24 1344   Dressing Status Intact 02/15/24 1344                         Debridement   Wound 09/07/23 Diabetic Ulcer Foot Right;Plantar;Posterior    Universal Protocol:  Consent: Verbal consent obtained.  Consent given by: patient  Patient understanding: patient states understanding of  "the procedure being performed  Patient identity confirmed: verbally with patient    Debridement Details  Performed by: physician  Debridement type: surgical  Level of debridement: subcutaneous tissue  Pain control: lidocaine 1%      Post-debridement measurements  Length (cm): 1.6  Width (cm): 2  Depth (cm): 0.2  Percent debrided: 100%  Surface Area (cm^2): 3.2  Area Debrided (cm^2): 3.2  Volume (cm^3): 0.64    Tissue and other material debrided: subcutaneous tissue  Devitalized tissue debrided: callus and slough  Instrument(s) utilized: curette  Bleeding: medium  Hemostasis obtained with: pressure and silver nitrate  Procedural pain (0-10): insensate  Post-procedural pain: insensate   Response to treatment: procedure was tolerated well                 Wound Instructions:  Orders Placed This Encounter   Procedures    Wound cleansing and dressings Diabetic Ulcer Right;Plantar;Posterior Foot     Right Foot Wound     Wash your hands with soap and water. Remove old dressing, discard into plastic bag and place in trash. Cleanse the wound with Dakins solution 5 to 10 minute soak then remove wet gauze and pat dry prior to applying a clean dressing. Do not use tissue or cotton balls. Do not scrub the wound.    Shower: no cant get wet in shower     Apply skin prep to skin surrounding wound.  Place double felt pad to help offload pressure.   Apply Purachol AG to the foot wound.   Cover with abd pad.  Secure with buffy and tape.    Change dressing every other day.     Standing Status:   Future     Standing Expiration Date:   2/15/2025         Susie Luu DPM      Portions of the record may have been created with voice recognition software. Occasional wrong word or \"sound a like\" substitutions may have occurred due to the inherent limitations of voice recognition software. Read the chart carefully and recognize, using context, where substitutions have occurred.     "

## 2024-02-21 ENCOUNTER — CLINICAL SUPPORT (OUTPATIENT)
Dept: BARIATRICS | Facility: CLINIC | Age: 69
End: 2024-02-21

## 2024-02-21 VITALS — BODY MASS INDEX: 36.5 KG/M2 | WEIGHT: 284.4 LBS | HEIGHT: 74 IN

## 2024-02-21 DIAGNOSIS — R63.5 ABNORMAL WEIGHT GAIN: Primary | ICD-10-CM

## 2024-02-21 PROCEDURE — RECHECK

## 2024-02-22 ENCOUNTER — OFFICE VISIT (OUTPATIENT)
Dept: WOUND CARE | Facility: CLINIC | Age: 69
End: 2024-02-22
Payer: MEDICARE

## 2024-02-22 ENCOUNTER — APPOINTMENT (OUTPATIENT)
Dept: LAB | Facility: HOSPITAL | Age: 69
End: 2024-02-22
Attending: INTERNAL MEDICINE
Payer: MEDICARE

## 2024-02-22 VITALS — SYSTOLIC BLOOD PRESSURE: 137 MMHG | TEMPERATURE: 97.9 F | DIASTOLIC BLOOD PRESSURE: 70 MMHG | HEART RATE: 74 BPM

## 2024-02-22 DIAGNOSIS — C82.08 FOLLICULAR LYMPHOMA GRADE I OF LYMPH NODES OF MULTIPLE SITES (HCC): ICD-10-CM

## 2024-02-22 DIAGNOSIS — E08.621 DIABETIC ULCER OF OTHER PART OF RIGHT FOOT ASSOCIATED WITH DIABETES MELLITUS DUE TO UNDERLYING CONDITION, WITH FAT LAYER EXPOSED (HCC): Primary | ICD-10-CM

## 2024-02-22 DIAGNOSIS — L97.512 DIABETIC ULCER OF OTHER PART OF RIGHT FOOT ASSOCIATED WITH DIABETES MELLITUS DUE TO UNDERLYING CONDITION, WITH FAT LAYER EXPOSED (HCC): Primary | ICD-10-CM

## 2024-02-22 LAB
ALBUMIN SERPL BCP-MCNC: 4.6 G/DL (ref 3.5–5)
ALP SERPL-CCNC: 55 U/L (ref 34–104)
ALT SERPL W P-5'-P-CCNC: 32 U/L (ref 7–52)
ANION GAP SERPL CALCULATED.3IONS-SCNC: 5 MMOL/L
AST SERPL W P-5'-P-CCNC: 21 U/L (ref 13–39)
BILIRUB SERPL-MCNC: 0.4 MG/DL (ref 0.2–1)
BUN SERPL-MCNC: 32 MG/DL (ref 5–25)
CALCIUM SERPL-MCNC: 10.8 MG/DL (ref 8.4–10.2)
CHLORIDE SERPL-SCNC: 99 MMOL/L (ref 96–108)
CO2 SERPL-SCNC: 34 MMOL/L (ref 21–32)
CREAT SERPL-MCNC: 1.2 MG/DL (ref 0.6–1.3)
GFR SERPL CREATININE-BSD FRML MDRD: 62 ML/MIN/1.73SQ M
GLUCOSE SERPL-MCNC: 121 MG/DL (ref 65–140)
POTASSIUM SERPL-SCNC: 4.6 MMOL/L (ref 3.5–5.3)
PROT SERPL-MCNC: 7.5 G/DL (ref 6.4–8.4)
SODIUM SERPL-SCNC: 138 MMOL/L (ref 135–147)

## 2024-02-22 PROCEDURE — 36415 COLL VENOUS BLD VENIPUNCTURE: CPT

## 2024-02-22 PROCEDURE — 11042 DBRDMT SUBQ TIS 1ST 20SQCM/<: CPT | Performed by: PODIATRIST

## 2024-02-22 PROCEDURE — 80053 COMPREHEN METABOLIC PANEL: CPT

## 2024-02-22 RX ORDER — LIDOCAINE 40 MG/G
CREAM TOPICAL ONCE
Status: COMPLETED | OUTPATIENT
Start: 2024-02-22 | End: 2024-02-22

## 2024-02-22 RX ADMIN — LIDOCAINE: 40 CREAM TOPICAL at 14:18

## 2024-02-22 NOTE — PROGRESS NOTES
Patient ID: Augustus Coffman is a 68 y.o. male Date of Birth 1955       Chief Complaint   Patient presents with    Follow Up Wound Care Visit     Right foot diabetic ulcer       Allergies:  Lisinopril    Diagnosis:  1. Diabetic ulcer of other part of right foot associated with diabetes mellitus due to underlying condition, with fat layer exposed (HCC)  -     lidocaine (LMX) 4 % cream  -     Wound cleansing and dressings Diabetic Ulcer Right;Plantar;Posterior Foot; Future       Diagnosis ICD-10-CM Associated Orders   1. Diabetic ulcer of other part of right foot associated with diabetes mellitus due to underlying condition, with fat layer exposed (HCC)  E08.621 lidocaine (LMX) 4 % cream    L97.512 Wound cleansing and dressings Diabetic Ulcer Right;Plantar;Posterior Foot           Assessment & Plan:  See wound orders.    The patient did get the foot wet in shower today.  Overall he has stayed off the foot and on his diet.  He hopes his BS will come down by next week.  Continue current treatment as the ulcer is very shallow.  Will hope to follow up with an A1c and consider biologic tissue application.    Subjective:   The patient has been off oral steroids for one day.          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HCC)    Hypertension    Hypercholesteremia    Hammer toe of right foot    Stasis dermatitis of both legs    Diabetic peripheral neuropathy (HCC)    Gout    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HCC)    Obesity with body mass index 30 or greater    Type 2 diabetes mellitus (HCC)    Retroperitoneal hematoma    Mesenteric lymphadenopathy    Acute blood loss anemia    Heart murmur    GI bleed    Pleural effusion    Chronic venous hypertension (idiopathic) with ulcer of right lower extremity (HCC)    Rash    FINA (acute kidney injury) (HCC)    Stage 2 chronic kidney disease     Hypertensive kidney disease with stage 3 chronic kidney disease (HCC)    Other proteinuria    Obesity, morbid (HCC)    Follicular lymphoma grade I of lymph nodes of multiple sites (HCC)    Vitamin D deficiency    Stage 3a chronic kidney disease (HCC)    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 (HCC)     Past Medical History:   Diagnosis Date    Chronic kidney disease     Diabetes mellitus (HCC)     Follicular lymphoma (HCC)     High cholesterol     Hypertension      Past Surgical History:   Procedure Laterality Date    BUNIONECTOMY Right 11/24/2020    Procedure: RIGHT HAV CORRECTION,;  Surgeon: Niranjan Child DPM;  [facility];  Service: Podiatry    COLONOSCOPY      INCISION AND DRAINAGE OF WOUND Right 10/05/2016    Procedure: INCISION AND DRAINAGE (I&D) EXTREMITY;  Surgeon: Niranjan Child DPM;  [facility];  Service:     IR BIOPSY LYMPH NODE  07/08/2021    IR EMBOLIZATION (SPECIFY VESSEL OR SITE)  07/08/2021    IR PORT PLACEMENT  9/1/2022    PILONIDAL CYST EXCISION      AR CORRECTION HAMMERTOE Right 02/19/2019    Procedure: THIRD HAMMER TOE CORRECTION;  Surgeon: Niranjan Child DPM;  [facility];  Service: Podiatry    AR RMVL MATEO CTR VAD W/SUBQ PORT/ CTR/PRPH INSJ N/A 3/14/2023    Procedure: REMOVAL VENOUS PORT (PORT-A-CATH)IR;  Surgeon: Avelino Vázquez DO;  [facility];  Service: Interventional Radiology    TOE OSTEOTOMY Right 03/14/2017    Procedure: HAMMERTOE CORRECTION R 2 ;  Surgeon: Niranjan Child DPM;  [facility];  Service:     TOE OSTEOTOMY Left 11/24/2020    Procedure: LEFT HT CORRECTION TOE;  Surgeon: Niranjan Child DPM;  [facility];  Service: Podiatry    TONSILLECTOMY  1963     Social History     Socioeconomic History    Marital status: /Civil Union     Spouse name: None    Number of children: None    Years of education: None    Highest education level: None   Occupational History    None   Tobacco Use    Smoking status: Never    Smokeless tobacco: Never     Tobacco comments:     Never smoked but exposed to second hand smoke from birth until 1980's   Vaping Use    Vaping status: Never Used   Substance and Sexual Activity    Alcohol use: No    Drug use: No    Sexual activity: Not Currently     Partners: Female     Birth control/protection: Abstinence   Other Topics Concern    None   Social History Narrative    None     Social Determinants of Health     Financial Resource Strain: Not on file   Food Insecurity: Not on file   Transportation Needs: Not on file   Physical Activity: Not on file   Stress: Not on file   Social Connections: Not on file   Intimate Partner Violence: Not on file   Housing Stability: Not on file        Current Outpatient Medications:     amLODIPine (NORVASC) 10 mg tablet, take 1 tablet by mouth once daily AT NOON, Disp: , Rfl:     aspirin 81 mg chewable tablet, Chew 81 mg daily, Disp: , Rfl:     Cholecalciferol 125 MCG (5000 UT) TABS, Take 5,000 Units by mouth in the morning, Disp: , Rfl:     Cholecalciferol 50 MCG (2000 UT) TABS, Take 1 tablet (2,000 Units total) by mouth daily (Patient not taking: Reported on 12/20/2023), Disp: , Rfl:     Empagliflozin (Jardiance) 25 MG TABS, Take 1 tablet (25 mg total) by mouth every morning, Disp: 90 tablet, Rfl: 2    hydrochlorothiazide (HYDRODIURIL) 25 mg tablet, Take 25 mg by mouth daily, Disp: , Rfl:     losartan (COZAAR) 100 MG tablet, Take 1 tablet (100 mg total) by mouth daily, Disp: , Rfl:     metFORMIN (GLUCOPHAGE) 500 mg tablet, Take 1,000 mg by mouth 2 (two) times a day with meals, Disp: , Rfl:     metoprolol succinate (TOPROL-XL) 100 mg 24 hr tablet, Take 100 mg by mouth every evening, Disp: , Rfl:     Multiple Vitamins-Minerals (Centrum Silver 50+Men) TABS, , Disp: , Rfl:     predniSONE 20 mg tablet, Take 40 mg by mouth daily, Disp: , Rfl:     rosuvastatin (CRESTOR) 40 MG tablet, Take 40 mg by mouth every evening, Disp: , Rfl:     simvastatin (ZOCOR) 40 mg tablet, Take 40 mg by mouth daily at  bedtime, Disp: , Rfl:   No current facility-administered medications for this visit.  Family History   Problem Relation Age of Onset    Cancer Father         Leukemia      Review of Systems   Constitutional:  Negative for chills and fever.         Objective:  /70   Pulse 74   Temp 97.9 °F (36.6 °C)     Physical Exam  Neurological:      Mental Status: He is alert.             Wound 09/01/22 Incision Chest Anterior;Right (Active)       Wound 03/14/23 Chest Right (Active)       Wound 09/07/23 Diabetic Ulcer Foot Right;Plantar;Posterior (Active)   Enter Singh score: Singh Grade 1: Partial or full-thickness ulcer (superficial) 02/22/24 1411   Wound Image   02/22/24 1426   Wound Description Pink;Yellow 02/22/24 1411   Delisa-wound Assessment Maceration;Callus 02/22/24 1411   Wound Length (cm) 1.5 cm 02/22/24 1411   Wound Width (cm) 1.8 cm 02/22/24 1411   Wound Depth (cm) 0.1 cm 02/22/24 1411   Wound Surface Area (cm^2) 2.7 cm^2 02/22/24 1411   Wound Volume (cm^3) 0.27 cm^3 02/22/24 1411   Calculated Wound Volume (cm^3) 0.27 cm^3 02/22/24 1411   Change in Wound Size % 73 02/22/24 1411   Tunneling 1 in depth located at 1-12 o'clock 10/05/23 1348   Number of underminings 1 02/15/24 1344   Undermining 1 0.4 02/15/24 1344   Undermining 1 is depth extending from 9-12 02/15/24 1344   Drainage Amount Moderate 02/22/24 1411   Drainage Description Serosanguineous;Yellow 02/22/24 1411   Non-staged Wound Description Full thickness 02/22/24 1411   Dressing Status Intact 02/22/24 1411                         Debridement   Wound 09/07/23 Diabetic Ulcer Foot Right;Plantar;Posterior    Universal Protocol:  Consent: Verbal consent obtained.  Consent given by: patient  Patient understanding: patient states understanding of the procedure being performed  Patient identity confirmed: verbally with patient    Debridement Details  Performed by: physician  Debridement type: surgical  Level of debridement: subcutaneous tissue  Pain  "control: lidocaine 1%      Post-debridement measurements  Length (cm): 1.6  Width (cm): 1.8  Depth (cm): 0.1  Percent debrided: 100%  Surface Area (cm^2): 2.88  Area Debrided (cm^2): 2.88  Volume (cm^3): 0.29    Tissue and other material debrided: subcutaneous tissue  Devitalized tissue debrided: callus and slough  Instrument(s) utilized: curette  Bleeding: medium  Hemostasis obtained with: silver nitrate and pressure  Procedural pain (0-10): insensate  Post-procedural pain: insensate   Response to treatment: procedure was tolerated well                 Wound Instructions:  Orders Placed This Encounter   Procedures    Wound cleansing and dressings Diabetic Ulcer Right;Plantar;Posterior Foot     Wound cleansing and dressings                            Right Foot Wound      Wash your hands with soap and water. Remove old dressing, discard into plastic bag and place in trash. Cleanse the wound with Dakins solution 5 to 10 minute soak then remove wet gauze and pat dry prior to applying a clean dressing. Do not use tissue or cotton balls. Do not scrub the wound.      Shower: no cant get wet in shower     Apply skin prep to skin surrounding wound     Place double felt pad to help offload pressure.    Apply Purachol ag to the foot wound.   Cover with abd pad Secure with buffy and tape.  Change dressing three times a week     Standing Status:   Future     Standing Expiration Date:   2/22/2025         Susie Luu DPM      Portions of the record may have been created with voice recognition software. Occasional wrong word or \"sound a like\" substitutions may have occurred due to the inherent limitations of voice recognition software. Read the chart carefully and recognize, using context, where substitutions have occurred.     "

## 2024-02-22 NOTE — PATIENT INSTRUCTIONS
Orders Placed This Encounter   Procedures    Wound cleansing and dressings Diabetic Ulcer Right;Plantar;Posterior Foot     Wound cleansing and dressings                            Right Foot Wound      Wash your hands with soap and water. Remove old dressing, discard into plastic bag and place in trash. Cleanse the wound with Dakins solution 5 to 10 minute soak then remove wet gauze and pat dry prior to applying a clean dressing. Do not use tissue or cotton balls. Do not scrub the wound.      Shower: no cant get wet in shower     Apply skin prep to skin surrounding wound     Place double felt pad to help offload pressure.    Apply Purachol ag to the foot wound.   Cover with abd pad Secure with buffy and tape.  Change dressing three times a week     Standing Status:   Future     Standing Expiration Date:   2/22/2025

## 2024-02-26 ENCOUNTER — HOSPITAL ENCOUNTER (OUTPATIENT)
Dept: CT IMAGING | Facility: HOSPITAL | Age: 69
Discharge: HOME | End: 2024-02-26
Attending: INTERNAL MEDICINE
Payer: MEDICARE

## 2024-02-26 DIAGNOSIS — C82.08 FOLLICULAR LYMPHOMA GRADE I OF LYMPH NODES OF MULTIPLE SITES (HCC): ICD-10-CM

## 2024-02-26 PROCEDURE — 74177 CT ABD & PELVIS W/CONTRAST: CPT

## 2024-02-26 PROCEDURE — 71260 CT THORAX DX C+: CPT

## 2024-02-26 PROCEDURE — G1004 CDSM NDSC: HCPCS

## 2024-02-26 RX ADMIN — IOHEXOL 100 ML: 350 INJECTION, SOLUTION INTRAVENOUS at 14:59

## 2024-02-27 NOTE — PROGRESS NOTES
Telemedicine consent    Patient: Augustus Coffman  Provider: Naomi Cummings MD  Provider located at [address] [facility] [place]  [address] [facility] [address]    The patient was identified by name and date of birth. Augustus Coffman was informed that this is a telemedicine visit and that the visit is being conducted through the Ske[employer] platform. He agrees to proceed..  My office door was closed. No one else was in the room.  He acknowledged consent and understanding of privacy and security of the video platform. The patient has agreed to participate and understands they can discontinue the visit at any time.    Patient is aware this is a billable service.                                    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 Follicular lymphoma of intra-abdominal lymph nodes, unspecified follicular lymphoma type (HCC)    Chief Complaint:    Follow up after chemotherapy treatments     Assessment/Plan:   1. Follicular Lymphoma, WHO grade 1-2, initially diagnosed 07/8/2021.  Patient was on observation and had no constitutional symptoms.  Recently, he developed complaint of chest pain likely related to epigastric pain with burning sensation.  He then began having discomfort in the abdomen in other areas.  Also began having diarrhea.  He had extensive workup from GI which was unrevealing.  GI believed he had symptoms related to his follicular lymphoma.  As a result, treatment was indicated.  Patient started BR treatment Q 28 days on September 6, 2022. 6 cycles given. Restaging PET/CT 2/20/2023 showed no metabolic activity.      Discussion of decision making    I personally reviewed the following lab  results, the image studies, pathology, other specialty/physicians consult notes and recommendations, and outside medical records from [facility]/other institutions. I had a lengthy discussion with the patient and shared the work-up findings. I spent 33 minutes reviewing the records (labs, clinician notes, outside records, medical history, ordering medicine/tests/procedures, interpreting the imaging/labs previously done) and coordination of care as well as direct time with the patient today, of which greater than 50% of the time was spent in counseling and coordination of care with the patient/family.    Plan/Labs  Restaging CT CAP w/c in 9 months, then can be in 12 months    Follow Up: 9 months (once a year in person in [place], virtual [facility])    All questions were answered to the patient's satisfaction during this encounter. The patient knows the contact information for our office and knows to reach out for any relevant concerns related to this encounter. They are to call for any temperature 100.4 or higher, new symptoms including but not restricted to shaking chills, decreased appetite, nausea, vomiting, diarrhea, increased fatigue, shortness of breath or chest pain, confusion, and not feeling the strength to come to the clinic. For all other listed problems and medical diagnosis in their chart - they are managed by PCP and/or other specialists, which the patient acknowledges. Thank you very much for your consultation and making us a part of this patient's care. We are continuing to follow closely with you. Please do not hesitate to reach out to me with any additional questions or concerns.     Naomi Cummings MD  Physician, Medical Oncology-Hematology    AJCC 8th Edition Cancer Stage :      Cancer Staging  No matching staging information was found for the patient.    Hematology/Oncology History:   - incidentally diagnosed while doing imaging for aortic aneurysm, showed retroperitoneum and mesenteric  lymphadenopathy, largest lymph node about 3 cm, status post biopsy showed follicular lymphoma; grade 1-2;   - 7/2021 :  Developed a big hematoma post biopsy.  Hospitalized.  Hemoglobin dropped to 7.8 then stable.  -  8/2021 : Hb recovered. PET showed moderate tumor burden. Does not fit GELF criteria for treatment; FLIPI score = 2, age +  Stage 3. PET CT showed low SUV. Decided to observe.   - Feb 28, 2022 US Head and neck:Patient's lump in the right submandibular region corresponds to an enlarged, heterogeneous submandibular lymph node measuring 1.8 x 1.1 x 1.9 cm (image 18.) Review of the prior PET/CT from 8/10/2021 demonstrate an enlarged, hypermetabolic node in this area, therefore this is likely part of the patient's known follicular lymphoma. Difficult to assess interval change because of the difference in imaging modality  - May 12, 2022, CT CAP w/o c with interval increase in mesenteric lymph nodes, new pulmonary nodules. Conglomerate kennedy mass at the root of the small bowel mesentery on image 82 of series 2 measures approximately 12.0 x 7.5 cm, increased from 8.9 x 6.3 cm when measured using similar technique on July 10, 2021.  Discrete upper retroperitoneal nodes are slightly increased from July 10, 2021, specifically an aortocaval node measuring 2.9 x 2.3 cm on image 74 of series 2, increased from 2.2 x 2.0 cm on prior exam and an anterior left periaortic node measuring 2.4 x 2.0 cm on image 76 of series 2 increased from 2.1 x 1.7 cm on prior examination    August 6, 2022, CT abd, pelvis w/o contrast:  8.6 cm low-attenuation focus within the spleen which is not present on CT examination of 5/12/2022. Differential considerations include splenic abscess or progression of patient's follicular lymphoma. This finding can be further evaluated with contrast-enhanced MR abdomen.  Progression of upper abdominal lymphadenopathy. Mesenteric and retroperitoneal lymph nodes kennedy burden appears similar to  5/12/2022.  New 2.6 cm cystic focus immediately deep to the umbilicus likely to represent additional disease spread.    August 8, 2022, patient had an EGD showing erythema in antrum.  Small proximal 1 cm sliding hiatal hernia.  Otherwise unrevealing findings.  Biopsies were benign.  August 19, 2022, chronic hepatitis panel was negative.  September 6th, 2022 started cycle 1, day 1 of Bendamustine + Rituxan Q28 days and completed 6 cycles  2/20/2023 PET/CT: Retroperitoneal, iliac chain, and inguinal lymphadenopathy as described.  However, the lymph nodes are not significantly metabolically active on PET,  with uptake less than the mediastinal blood pool (Deauville score 2). Large low-attenuation area within the spleen measuring 3.5 x 3.9 cm measures smaller compared to the CT of 11/21/2022 and is not metabolically active on PET  8/21/2023: CT CAP w/c: No evidence of lymphoma in the chest. Previous nodules have resolved. Overall improved lymphadenopathy in the abdomen and pelvis, particularly in the spleen, mesentery and upper retroperitoneum. Pelvic lymphadenopathy is essentially unchanged. New nodular focus along the right seminal vesicle which may represent an enlarged lymph node. This may also be related to lymphoma, though given the overall stability/improvement in lymphadenopathy elsewhere, a distinct etiology, possibly related to the prostate, is not excluded  2/26/2024 CT CAP w/c: small 2 mm stone, iliac lymph node measuring 1 cm on the right side and left side right-sided pelvic sidewall lymph node measuring 1.2 cm, stable. Left common iliac lymph node measuring 1.3 cm, stable. Para-aortic lymph node measuring 1.1 cm, stable. Aortocaval lymph node measuring about 1.2 cm, stable. Portacaval lymph nodes are seen. Mesenteric lymph nodes are seen measuring about 6 to 7 mm    History of Present Illiness:   Augustus Coffman is a 68 y.o. male with the above-noted HemOnc history who is here  to follow-up regarding  history of follicular lymphoma.    Patient has history of GERD, chest pain likely related to GERD.  He also has been having decreased appetite causing some weight loss.  He still has sensation of upset stomach.  GI physicians spoke with my attending who believes his symptoms could be related to his follicular lymphoma.  Patient was started on BR treatment Q 28 days September 6, 2022.    Interval history   No acute issues, feels very good. 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    Denies F/C, N/V, SOB, CP, LH, HA, rash, itching, gen weakness, melena, hematuria, hematochezia, LOC, falls, diarrhea, or constipation      ROS: A 10-point of review of systems is obtained and other than the above is noncontributory.     Objective:   VITALS:   There were no vitals taken for this visit.    Weight at last visit: 215 lbs    Below not updated as this was a televisit    Physical EXAM:  General:  Alert, cooperative, no distress, appears stated age.   Head:  Normocephalic, without obvious abnormality, atraumatic.   Eyes:  Conjunctivae/corneas clear. EOMs intact. No evidence of conjunctivitis     Throat: Lips, mucosa, and tongue normal.  No bleeding from mouth.   Neck: Supple, symmetrical, trachea midline    Lungs:    Respiratory effort easy, nonlabored    Heart:  Regular rate and rhythm, +S1, S2 .   Abdomen:   Soft, non-tender,nondistended.      Extremities:  Lymphatics: Extremities normal, atraumatic, no cyanosis or edema.   No cervical, axillary or inguinal adenopathy   Skin: Skin color, texture, turgor normal. No rashes.    Neurologic: AAO. No focal neuro deficits       Allergies   Allergen Reactions    Lisinopril Cough       Past Medical History:   Diagnosis Date    Chronic kidney disease     Diabetes mellitus (HCC)     Follicular lymphoma (HCC)     High cholesterol     Hypertension        Past Surgical History:   Procedure Laterality Date    BUNIONECTOMY  Right 11/24/2020    Procedure: RIGHT HAV CORRECTION,;  Surgeon: Niranjan Child DPM;  Location: [facility];  Service: Podiatry    COLONOSCOPY      INCISION AND DRAINAGE OF WOUND Right 10/05/2016    Procedure: INCISION AND DRAINAGE (I&D) EXTREMITY;  Surgeon: Niranjan Child DPM;  Location[facility];  Service:     IR BIOPSY LYMPH NODE  07/08/2021    IR EMBOLIZATION (SPECIFY VESSEL OR SITE)  07/08/2021    IR PORT PLACEMENT  9/1/2022    PILONIDAL CYST EXCISION      MO CORRECTION HAMMERTOE Right 02/19/2019    Procedure: THIRD HAMMER TOE CORRECTION;  Surgeon: Niranjan Child DPM;  [facility];  Service: Podiatry    MO RMVL MATEO CTR VAD W/SUBQ PORT/ CTR/PRPH INSJ N/A 3/14/2023    Procedure: REMOVAL VENOUS PORT (PORT-A-CATH)IR;  Surgeon: Avelino Vázquez DO;  [facility];  Service: Interventional Radiology    TOE OSTEOTOMY Right 03/14/2017    Procedure: HAMMERTOE CORRECTION R 2 ;  Surgeon: Niranjan Child DPM;  [facility];  Service:     TOE OSTEOTOMY Left 11/24/2020    Procedure: LEFT HT CORRECTION TOE;  Surgeon: Niranjan Child DPM;  [facility];  Service: Podiatry    TONSILLECTOMY  1963       Family History   Problem Relation Age of Onset    Cancer Father         Leukemia       Social History     Socioeconomic History    Marital status: /Civil Union     Spouse name: Not on file    Number of children: Not on file    Years of education: Not on file    Highest education level: Not on file   Occupational History    Not on file   Tobacco Use    Smoking status: Never    Smokeless tobacco: Never    Tobacco comments:     Never smoked but exposed to second hand smoke from birth until 1980's   Vaping Use    Vaping status: Never Used   Substance and Sexual Activity    Alcohol use: No    Drug use: No    Sexual activity: Not Currently     Partners: Female     Birth control/protection: Abstinence   Other Topics Concern    Not on file   Social History Narrative    Not on file     Social Determinants  of Health     Financial Resource Strain: Not on file   Food Insecurity: Not on file   Transportation Needs: Not on file   Physical Activity: Not on file   Stress: Not on file   Social Connections: Not on file   Intimate Partner Violence: Not on file   Housing Stability: Not on file       Current Outpatient Medications   Medication Sig Dispense Refill    amLODIPine (NORVASC) 10 mg tablet take 1 tablet by mouth once daily AT NOON      aspirin 81 mg chewable tablet Chew 81 mg daily      Cholecalciferol 125 MCG (5000 UT) TABS Take 5,000 Units by mouth in the morning      Cholecalciferol 50 MCG (2000 UT) TABS Take 1 tablet (2,000 Units total) by mouth daily (Patient not taking: Reported on 12/20/2023)      Empagliflozin (Jardiance) 25 MG TABS Take 1 tablet (25 mg total) by mouth every morning 90 tablet 2    hydrochlorothiazide (HYDRODIURIL) 25 mg tablet Take 25 mg by mouth daily      losartan (COZAAR) 100 MG tablet Take 1 tablet (100 mg total) by mouth daily      metFORMIN (GLUCOPHAGE) 500 mg tablet Take 1,000 mg by mouth 2 (two) times a day with meals      metoprolol succinate (TOPROL-XL) 100 mg 24 hr tablet Take 100 mg by mouth every evening      Multiple Vitamins-Minerals (Centrum Silver 50+Men) TABS       predniSONE 20 mg tablet Take 40 mg by mouth daily      rosuvastatin (CRESTOR) 40 MG tablet Take 40 mg by mouth every evening      simvastatin (ZOCOR) 40 mg tablet Take 40 mg by mouth daily at bedtime       No current facility-administered medications for this visit.       (Not in a hospital admission)      DATA REVIEW:    Pathology Result:    Final Diagnosis   Date Value Ref Range Status   08/15/2022   Final    A. Stomach, linear antral erythema:  - Gastric antral mucosa with mild chronic active gastritis and regenerative epithelial changes.  - No Helicobacter pylori organisms are identified with immunoperoxidase stain.  - Negative for intestinal metaplasia, dysplasia or carcinoma.     B. Esophagus, irregular z line  39:  - Squamocolumnar junction mucosa with mild chronic inflammation and regenerative epithelial changes.  - No intestinal metaplasia/ dysplasia identified.    C. Esophagogastric junction, small nodule ge junction:  - Fragments of esophageal squamous and gastric glandular mucosa with intestinal metaplasia and regenerative epithelial changes.  - Intestinal metaplasia identified with Alcian blue/PAS stain.  - Pan keratin stain highlights benign epithelial elements.  - No dysplasia identified. See note.    Note (C): This biopsy shows gastric-type mucosa with scattered goblet cells.  The diagnosis in this case depends on the location of this biopsy.  If this biopsy was taken from the tubular esophagus at least 1 cm above the gastric folds, it represents Ozuna mucosa of the distinctive type.  If this biopsy was taken from the gastric cardia, it represents intestinal metaplasia of the gastric cardia.     Reference: Justin NJ, Bon GW, Katie DG, Jaciel LB; American College of Gastroenterology.  AGC Clinical Guideline: Diagnosis and Management of Ozuna's Esophagus. Am J Gastroenterol. 2016 Ej;111(130-50.     07/08/2021   Final    A. Lymph node, periaortic, biopsy:  -  Follicular lymphoma, WHO grade 1-2 (of 3) (see note)       06/28/2021   Final    A. Duodenum:  - Small bowel mucosa with no significant histopathologic abnormality.  - Negative for malabsorption pattern.  - Negative for malignancy.     B. Stomach:  - Gastric mucosa with no significant histopathologic abnormality.  - Negative for H. pylori by routine H&E.  - Negative for malignancy.     C. Esophagus, distal:  - Squamocolumnar mucosa with intestinal metaplasia.  See comment.  - Negative for dysplasia and malignancy.    Comment: The diagnosis in this case depends on the location of this biopsy.  If this biopsy was taken from the tubular esophagus at least 1 cm above the gastric folds, it represents Ozuna mucosa.  If this biopsy was taken from the gastric  cardia, it represents intestinal metaplasia of the gastric cardia.          Image Results:  They are reviewed and documented in Hematology/Oncology history    CT chest abdomen pelvis w contrast  Narrative: CT CHEST, ABDOMEN AND PELVIS WITH IV CONTRAST    INDICATION: C82.08: Follicular lymphoma grade i, lymph nodes of multiple sites.    COMPARISON: August 21, 2023  TECHNIQUE: CT examination of the chest, abdomen and pelvis was performed. Multiplanar 2D reformatted images were created from the source data.    This examination, like all CT scans performed in the [facility], was performed utilizing techniques to minimize radiation dose exposure, including the use of iterative reconstruction and automated exposure control. Radiation dose length   product (DLP) for this visit: 1171 mGy-cm    IV Contrast: 100 mL of iohexol (OMNIPAQUE)  Enteric Contrast: Not administered.    FINDINGS:    CHEST    LUNGS: Linear density seen right upper lobe, stable  Trachea and central bronchi are patent    PLEURA: Unremarkable.    HEART/GREAT VESSELS: Heart is unremarkable for patient's age. Ascending aorta measures 4.4 cm, stable    MEDIASTINUM AND GABRIEL: Unremarkable.    CHEST WALL AND LOWER NECK: Unremarkable.  Left submandibular nodular density measuring 1.2 cm, stable, image 1 series 2  ABDOMEN    LIVER/BILIARY TREE: Unremarkable.    GALLBLADDER: No calcified gallstones. No pericholecystic inflammatory change.    SPLEEN: Unremarkable.    PANCREAS: Unremarkable.    ADRENAL GLANDS: Unremarkable.    KIDNEYS/URETERS: Hypodensity seen lower pole of the right kidney measuring 1.8 cm with septation, stable since previous study of July 8, 2021  Nonobstructing calculus lower pole left kidney measuring 2 mm  STOMACH AND BOWEL: Unremarkable.    APPENDIX: No findings to suggest appendicitis.    ABDOMINOPELVIC CAVITY: No ascites. No pneumoperitoneum. External iliac lymph node measuring 1 cm on the right side and left side  right-sided pelvic sidewall lymph node measuring 1.2 cm, stable  Left common iliac lymph node measuring 1.3 cm, stable. Para-aortic lymph node measuring 1.1 cm, stable  Aortocaval lymph node measuring about 1.2 cm, stable  Portacaval lymph nodes are seen  Mesenteric lymph nodes are seen measuring about 6 to 7 mm    VESSELS: Unremarkable for patient's age.    PELVIS    REPRODUCTIVE ORGANS: Unremarkable for patient's age.  Nodular focus within the right seminal vesicle image 236 series 2, stable  URINARY BLADDER: Unremarkable.    ABDOMINAL WALL/INGUINAL REGIONS: Unremarkable.    BONES: No acute fracture or suspicious osseous lesion.  Impression: Stable CT    Workstation performed: [id number]        LABS:  Lab data are reviewed and documented in HemOnc history.     No results found for this or any previous visit (from the past 48 hour(s)).          Naomi Cummings  3/8/2024, 11:28 AM

## 2024-02-28 ENCOUNTER — CLINICAL SUPPORT (OUTPATIENT)
Dept: BARIATRICS | Facility: CLINIC | Age: 69
End: 2024-02-28

## 2024-02-28 VITALS — WEIGHT: 285 LBS | BODY MASS INDEX: 36.57 KG/M2 | HEIGHT: 74 IN

## 2024-02-28 DIAGNOSIS — R63.5 ABNORMAL WEIGHT GAIN: Primary | ICD-10-CM

## 2024-02-28 PROCEDURE — RECHECK

## 2024-02-29 ENCOUNTER — OFFICE VISIT (OUTPATIENT)
Dept: WOUND CARE | Facility: CLINIC | Age: 69
End: 2024-02-29
Payer: MEDICARE

## 2024-02-29 VITALS
TEMPERATURE: 97.2 F | DIASTOLIC BLOOD PRESSURE: 58 MMHG | SYSTOLIC BLOOD PRESSURE: 116 MMHG | HEART RATE: 65 BPM | RESPIRATION RATE: 18 BRPM

## 2024-02-29 DIAGNOSIS — E08.621 DIABETIC ULCER OF OTHER PART OF RIGHT FOOT ASSOCIATED WITH DIABETES MELLITUS DUE TO UNDERLYING CONDITION, WITH FAT LAYER EXPOSED (HCC): Primary | ICD-10-CM

## 2024-02-29 DIAGNOSIS — L97.512 DIABETIC ULCER OF OTHER PART OF RIGHT FOOT ASSOCIATED WITH DIABETES MELLITUS DUE TO UNDERLYING CONDITION, WITH FAT LAYER EXPOSED (HCC): Primary | ICD-10-CM

## 2024-02-29 PROCEDURE — 11042 DBRDMT SUBQ TIS 1ST 20SQCM/<: CPT | Performed by: PODIATRIST

## 2024-02-29 RX ORDER — LIDOCAINE 40 MG/G
CREAM TOPICAL ONCE
Status: COMPLETED | OUTPATIENT
Start: 2024-02-29 | End: 2024-02-29

## 2024-02-29 RX ADMIN — LIDOCAINE: 40 CREAM TOPICAL at 14:04

## 2024-02-29 NOTE — PROGRESS NOTES
Patient ID: Augustus Coffman is a 68 y.o. male Date of Birth 1955       Chief Complaint   Patient presents with    Follow Up Wound Care Visit     Right foot diabetic ulcer       Allergies:  Lisinopril    Diagnosis:  1. Diabetic ulcer of other part of right foot associated with diabetes mellitus due to underlying condition, with fat layer exposed (HCC)  -     lidocaine (LMX) 4 % cream  -     Wound cleansing and dressings Diabetic Ulcer Right;Plantar;Posterior Foot; Future       Diagnosis ICD-10-CM Associated Orders   1. Diabetic ulcer of other part of right foot associated with diabetes mellitus due to underlying condition, with fat layer exposed (HCC)  E08.621 lidocaine (LMX) 4 % cream    L97.512 Wound cleansing and dressings Diabetic Ulcer Right;Plantar;Posterior Foot           Assessment & Plan:  See wound orders.    Diabetic ulcer right foot to fat layer/rosa 2 ulcer.  Patient will continue on weight management and tight BS control.  Patient must stay off the foot and continue local wound care.  It has been 3 months since his last A1c.  Will recheck because if it is trending down, he would be a good candidate for biologic tissue application.    Subjective:   The patient still has a cough.  His BS is better since being off the steroids.  It was 126 today.  He is down over 20 pounds at weight management.  No new complaints with his foot.        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HCC)    Hypertension    Hypercholesteremia    Hammer toe of right foot    Stasis dermatitis of both legs    Diabetic peripheral neuropathy (HCC)    Gout    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HCC)    Obesity with body mass index 30 or greater    Type 2 diabetes mellitus (HCC)    Retroperitoneal hematoma    Mesenteric lymphadenopathy    Acute blood loss anemia    Heart murmur    GI bleed    Pleural  effusion    Chronic venous hypertension (idiopathic) with ulcer of right lower extremity (HCC)    Rash    FINA (acute kidney injury) (HCC)    Stage 2 chronic kidney disease    Hypertensive kidney disease with stage 3 chronic kidney disease (HCC)    Other proteinuria    Obesity, morbid (HCC)    Follicular lymphoma grade I of lymph nodes of multiple sites (HCC)    Vitamin D deficiency    Stage 3a chronic kidney disease (HCC)    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 (HCC)     Past Medical History:   Diagnosis Date    Chronic kidney disease     Diabetes mellitus (HCC)     Follicular lymphoma (HCC)     High cholesterol     Hypertension      Past Surgical History:   Procedure Laterality Date    BUNIONECTOMY Right 11/24/2020    Procedure: RIGHT HAV CORRECTION,;  Surgeon: Niranjan Child DPM;  [facility];  Service: Podiatry    COLONOSCOPY      INCISION AND DRAINAGE OF WOUND Right 10/05/2016    Procedure: INCISION AND DRAINAGE (I&D) EXTREMITY;  Surgeon: Niranjan Child DPM;  [facility];  Service:     IR BIOPSY LYMPH NODE  07/08/2021    IR EMBOLIZATION (SPECIFY VESSEL OR SITE)  07/08/2021    IR PORT PLACEMENT  9/1/2022    PILONIDAL CYST EXCISION      [place] CORRECTION HAMMERTOE Right 02/19/2019    Procedure: THIRD HAMMER TOE CORRECTION;  Surgeon: Niranjan Child DPM;  [facility];  Service: Podiatry    [place] RMVL MATEO CTR VAD W/SUBQ PORT/ CTR/PRPH INSJ N/A 3/14/2023    Procedure: REMOVAL VENOUS PORT (PORT-A-CATH)IR;  Surgeon: Avelino Vázquez DO;  [facility];  Service: Interventional Radiology    TOE OSTEOTOMY Right 03/14/2017    Procedure: HAMMERTOE CORRECTION R 2 ;  Surgeon: Niranjan Child DPM;  [facility];  Service:     TOE OSTEOTOMY Left 11/24/2020    Procedure: LEFT HT CORRECTION TOE;  Surgeon: Niranjan Child DPM;  [facility];  Service: Podiatry    TONSILLECTOMY  1963     Social History     Socioeconomic History    Marital status: /Civil Union     Spouse name: None    Number of  children: None    Years of education: None    Highest education level: None   Occupational History    None   Tobacco Use    Smoking status: Never    Smokeless tobacco: Never    Tobacco comments:     Never smoked but exposed to second hand smoke from birth until 1980's   Vaping Use    Vaping status: Never Used   Substance and Sexual Activity    Alcohol use: No    Drug use: No    Sexual activity: Not Currently     Partners: Female     Birth control/protection: Abstinence   Other Topics Concern    None   Social History Narrative    None     Social Determinants of Health     Financial Resource Strain: Not on file   Food Insecurity: Not on file   Transportation Needs: Not on file   Physical Activity: Not on file   Stress: Not on file   Social Connections: Not on file   Intimate Partner Violence: Not on file   Housing Stability: Not on file        Current Outpatient Medications:     amLODIPine (NORVASC) 10 mg tablet, take 1 tablet by mouth once daily AT NOON, Disp: , Rfl:     aspirin 81 mg chewable tablet, Chew 81 mg daily, Disp: , Rfl:     Cholecalciferol 125 MCG (5000 UT) TABS, Take 5,000 Units by mouth in the morning, Disp: , Rfl:     Cholecalciferol 50 MCG (2000 UT) TABS, Take 1 tablet (2,000 Units total) by mouth daily (Patient not taking: Reported on 12/20/2023), Disp: , Rfl:     Empagliflozin (Jardiance) 25 MG TABS, Take 1 tablet (25 mg total) by mouth every morning, Disp: 90 tablet, Rfl: 2    hydrochlorothiazide (HYDRODIURIL) 25 mg tablet, Take 25 mg by mouth daily, Disp: , Rfl:     losartan (COZAAR) 100 MG tablet, Take 1 tablet (100 mg total) by mouth daily, Disp: , Rfl:     metFORMIN (GLUCOPHAGE) 500 mg tablet, Take 1,000 mg by mouth 2 (two) times a day with meals, Disp: , Rfl:     metoprolol succinate (TOPROL-XL) 100 mg 24 hr tablet, Take 100 mg by mouth every evening, Disp: , Rfl:     Multiple Vitamins-Minerals (Centrum Silver 50+Men) TABS, , Disp: , Rfl:     predniSONE 20 mg tablet, Take 40 mg by mouth daily,  Disp: , Rfl:     rosuvastatin (CRESTOR) 40 MG tablet, Take 40 mg by mouth every evening, Disp: , Rfl:     simvastatin (ZOCOR) 40 mg tablet, Take 40 mg by mouth daily at bedtime, Disp: , Rfl:   No current facility-administered medications for this visit.  Family History   Problem Relation Age of Onset    Cancer Father         Leukemia      Review of Systems   Constitutional:  Negative for chills and fever.         Objective:  /58   Pulse 65   Temp (!) 97.2 °F (36.2 °C)   Resp 18     Physical Exam  Neurological:      Mental Status: He is alert.             Wound 09/01/22 Incision Chest Anterior;Right (Active)       Wound 03/14/23 Chest Right (Active)       Wound 09/07/23 Diabetic Ulcer Foot Right;Plantar;Posterior (Active)   Enter Singh score: Singh Grade 1: Partial or full-thickness ulcer (superficial) 02/29/24 1400   Wound Image   02/29/24 1409   Wound Description Pink;Yellow 02/29/24 1400   Delisa-wound Assessment Maceration;Callus 02/29/24 1400   Wound Length (cm) 1.5 cm 02/29/24 1400   Wound Width (cm) 1.7 cm 02/29/24 1400   Wound Depth (cm) 0.1 cm 02/29/24 1400   Wound Surface Area (cm^2) 2.55 cm^2 02/29/24 1400   Wound Volume (cm^3) 0.255 cm^3 02/29/24 1400   Calculated Wound Volume (cm^3) 0.26 cm^3 02/29/24 1400   Change in Wound Size % 74 02/29/24 1400   Tunneling 1 in depth located at 1-12 o'clock 10/05/23 1348   Number of underminings 1 02/15/24 1344   Undermining 1 0.4 02/15/24 1344   Undermining 1 is depth extending from 9-12 02/15/24 1344   Drainage Amount Small 02/29/24 1400   Drainage Description Serosanguineous;Yellow 02/29/24 1400   Non-staged Wound Description Full thickness 02/29/24 1400   Dressing Status Intact 02/29/24 1400                         Debridement   Wound 09/07/23 Diabetic Ulcer Foot Right;Plantar;Posterior    Universal Protocol:  Consent: Verbal consent obtained.  Consent given by: patient  Patient understanding: patient states understanding of the procedure being  "performed  Patient identity confirmed: verbally with patient    Debridement Details  Performed by: physician  Debridement type: surgical  Level of debridement: subcutaneous tissue  Pain control: lidocaine 1%      Post-debridement measurements  Length (cm): 1.5  Width (cm): 1.8  Depth (cm): 0.1  Percent debrided: 100%  Surface Area (cm^2): 2.7  Area Debrided (cm^2): 2.7  Volume (cm^3): 0.27    Tissue and other material debrided: subcutaneous tissue  Devitalized tissue debrided: slough  Instrument(s) utilized: curette  Bleeding: medium  Hemostasis obtained with: pressure and silver nitrate  Procedural pain (0-10): insensate  Post-procedural pain: insensate   Response to treatment: procedure was tolerated well                 Wound Instructions:  Orders Placed This Encounter   Procedures    Wound cleansing and dressings Diabetic Ulcer Right;Plantar;Posterior Foot     Wound cleansing and dressings                             Right Foot Wound      Wash your hands with soap and water. Remove old dressing, discard into plastic bag and place in trash. Cleanse the wound with Dakins solution 5 to 10 minute soak then remove wet gauze and pat dry prior to applying a clean dressing. Do not use tissue or cotton balls. Do not scrub the wound.      Shower: no cant get wet in shower     Apply skin prep to skin surrounding wound      Place double felt pad to help offload pressure.     Apply Purachol ag to the foot wound.   Cover with abd pad Secure with buffy and tape.  Change dressing three times a week     Standing Status:   Future     Standing Expiration Date:   2/28/2025         Susie Luu DPM      Portions of the record may have been created with voice recognition software. Occasional wrong word or \"sound a like\" substitutions may have occurred due to the inherent limitations of voice recognition software. Read the chart carefully and recognize, using context, where substitutions have occurred.     "

## 2024-02-29 NOTE — PATIENT INSTRUCTIONS
Orders Placed This Encounter   Procedures    Wound cleansing and dressings Diabetic Ulcer Right;Plantar;Posterior Foot     Wound cleansing and dressings                             Right Foot Wound      Wash your hands with soap and water. Remove old dressing, discard into plastic bag and place in trash. Cleanse the wound with Dakins solution 5 to 10 minute soak then remove wet gauze and pat dry prior to applying a clean dressing. Do not use tissue or cotton balls. Do not scrub the wound.      Shower: no cant get wet in shower     Apply skin prep to skin surrounding wound      Place double felt pad to help offload pressure.     Apply Purachol ag to the foot wound.   Cover with abd pad Secure with buffy and tape.  Change dressing three times a week     Standing Status:   Future     Standing Expiration Date:   2/28/2025

## 2024-03-02 ENCOUNTER — APPOINTMENT (OUTPATIENT)
Dept: LAB | Facility: HOSPITAL | Age: 69
End: 2024-03-02
Payer: MEDICARE

## 2024-03-02 DIAGNOSIS — E11.9 TYPE 2 DIABETES MELLITUS WITHOUT COMPLICATION, UNSPECIFIED WHETHER LONG TERM INSULIN USE (HCC): ICD-10-CM

## 2024-03-02 PROCEDURE — 83036 HEMOGLOBIN GLYCOSYLATED A1C: CPT

## 2024-03-02 PROCEDURE — 36415 COLL VENOUS BLD VENIPUNCTURE: CPT

## 2024-03-03 LAB
EST. AVERAGE GLUCOSE BLD GHB EST-MCNC: 146 MG/DL
HBA1C MFR BLD: 6.7 %

## 2024-03-06 ENCOUNTER — CLINICAL SUPPORT (OUTPATIENT)
Dept: BARIATRICS | Facility: CLINIC | Age: 69
End: 2024-03-06

## 2024-03-06 VITALS — BODY MASS INDEX: 36.47 KG/M2 | HEIGHT: 74 IN | WEIGHT: 284.2 LBS

## 2024-03-06 DIAGNOSIS — R63.5 ABNORMAL WEIGHT GAIN: Primary | ICD-10-CM

## 2024-03-06 PROCEDURE — RECHECK

## 2024-03-07 ENCOUNTER — OFFICE VISIT (OUTPATIENT)
Dept: WOUND CARE | Facility: CLINIC | Age: 69
End: 2024-03-07
Payer: MEDICARE

## 2024-03-07 VITALS
TEMPERATURE: 97.9 F | HEART RATE: 69 BPM | SYSTOLIC BLOOD PRESSURE: 115 MMHG | DIASTOLIC BLOOD PRESSURE: 56 MMHG | RESPIRATION RATE: 18 BRPM

## 2024-03-07 DIAGNOSIS — E08.621 DIABETIC ULCER OF OTHER PART OF RIGHT FOOT ASSOCIATED WITH DIABETES MELLITUS DUE TO UNDERLYING CONDITION, WITH FAT LAYER EXPOSED (HCC): Primary | ICD-10-CM

## 2024-03-07 DIAGNOSIS — L97.512 DIABETIC ULCER OF OTHER PART OF RIGHT FOOT ASSOCIATED WITH DIABETES MELLITUS DUE TO UNDERLYING CONDITION, WITH FAT LAYER EXPOSED (HCC): Primary | ICD-10-CM

## 2024-03-07 PROCEDURE — 11042 DBRDMT SUBQ TIS 1ST 20SQCM/<: CPT | Performed by: PODIATRIST

## 2024-03-07 RX ORDER — LIDOCAINE 40 MG/G
CREAM TOPICAL ONCE
Status: COMPLETED | OUTPATIENT
Start: 2024-03-07 | End: 2024-03-07

## 2024-03-07 RX ADMIN — LIDOCAINE: 40 CREAM TOPICAL at 13:59

## 2024-03-07 NOTE — PROGRESS NOTES
Patient ID: Augustus Coffman is a 68 y.o. male Date of Birth 1955       Chief Complaint   Patient presents with    Follow Up Wound Care Visit     Right foot diabetic ulcer       Allergies:  Lisinopril    Diagnosis:  1. Diabetic ulcer of other part of right foot associated with diabetes mellitus due to underlying condition, with fat layer exposed (HCC)  -     lidocaine (LMX) 4 % cream  -     Wound cleansing and dressings Diabetic Ulcer Right;Plantar;Posterior Foot; Future       Diagnosis ICD-10-CM Associated Orders   1. Diabetic ulcer of other part of right foot associated with diabetes mellitus due to underlying condition, with fat layer exposed (HCC)  E08.621 lidocaine (LMX) 4 % cream    L97.512 Wound cleansing and dressings Diabetic Ulcer Right;Plantar;Posterior Foot           Assessment & Plan:  See wound orders.    Diabetic ulcer right foot to fat/rosa 2.  The patient is doing excellent with compliance.  His A1c is down to 6.7 with weight management.  He is also staying off the foot with the crutches.  He does not smoke.  Albumin is 4.6 and total protein is 7.5  His is an excellent candidate for biologic tissue application.  Will request to begin application of placental based product next week (graffix is likely choice).    Subjective:   The patient has been staying off the foot.  He is still doing weight management and just recently has his A1c rechecked.  It is 6.7        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HCC)    Hypertension    Hypercholesteremia    Hammer toe of right foot    Stasis dermatitis of both legs    Diabetic peripheral neuropathy (HCC)    Gout    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HCC)    Obesity with body mass index 30 or greater    Type 2 diabetes mellitus (HCC)    Retroperitoneal hematoma    Mesenteric lymphadenopathy    Acute blood loss anemia    Heart  murmur    GI bleed    Pleural effusion    Chronic venous hypertension (idiopathic) with ulcer of right lower extremity (HCC)    Rash    FINA (acute kidney injury) (HCC)    Stage 2 chronic kidney disease    Hypertensive kidney disease with stage 3 chronic kidney disease (HCC)    Other proteinuria    Obesity, morbid (HCC)    Follicular lymphoma grade I of lymph nodes of multiple sites (HCC)    Vitamin D deficiency    Stage 3a chronic kidney disease (HCC)    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 (HCC)     Past Medical History:   Diagnosis Date    Chronic kidney disease     Diabetes mellitus (HCC)     Follicular lymphoma (HCC)     High cholesterol     Hypertension      Past Surgical History:   Procedure Laterality Date    BUNIONECTOMY Right 11/24/2020    Procedure: RIGHT HAV CORRECTION,;  Surgeon: Niranjan Child DPM;  [facility];  Service: Podiatry    COLONOSCOPY      INCISION AND DRAINAGE OF WOUND Right 10/05/2016    Procedure: INCISION AND DRAINAGE (I&D) EXTREMITY;  Surgeon: Niranjan Child DPM;  [facility];  Service:     IR BIOPSY LYMPH NODE  07/08/2021    IR EMBOLIZATION (SPECIFY VESSEL OR SITE)  07/08/2021    IR PORT PLACEMENT  9/1/2022    PILONIDAL CYST EXCISION      [place] CORRECTION HAMMERTOE Right 02/19/2019    Procedure: THIRD HAMMER TOE CORRECTION;  Surgeon: Niranjan Child DPM;  [facility];  Service: Podiatry    [place] RMVL MATEO CTR VAD W/SUBQ PORT/ CTR/PRPH INSJ N/A 3/14/2023    Procedure: REMOVAL VENOUS PORT (PORT-A-CATH)IR;  Surgeon: Avelino Vázquez DO;  [facility];  Service: Interventional Radiology    TOE OSTEOTOMY Right 03/14/2017    Procedure: HAMMERTOE CORRECTION R 2 ;  Surgeon: Niranjan Child DPM;  [facility];  Service:     TOE OSTEOTOMY Left 11/24/2020    Procedure: LEFT HT CORRECTION TOE;  Surgeon: Niranjan Child DPM;  [facility];  Service: Podiatry    TONSILLECTOMY  1963     Social History     Socioeconomic History    Marital status: /Civil Union      Spouse name: Not on file    Number of children: Not on file    Years of education: Not on file    Highest education level: Not on file   Occupational History    Not on file   Tobacco Use    Smoking status: Never    Smokeless tobacco: Never    Tobacco comments:     Never smoked but exposed to second hand smoke from birth until 1980's   Vaping Use    Vaping status: Never Used   Substance and Sexual Activity    Alcohol use: No    Drug use: No    Sexual activity: Not Currently     Partners: Female     Birth control/protection: Abstinence   Other Topics Concern    Not on file   Social History Narrative    Not on file     Social Determinants of Health     Financial Resource Strain: Not on file   Food Insecurity: Not on file   Transportation Needs: Not on file   Physical Activity: Not on file   Stress: Not on file   Social Connections: Not on file   Intimate Partner Violence: Not on file   Housing Stability: Not on file        Current Outpatient Medications:     amLODIPine (NORVASC) 10 mg tablet, take 1 tablet by mouth once daily AT NOON, Disp: , Rfl:     aspirin 81 mg chewable tablet, Chew 81 mg daily, Disp: , Rfl:     Cholecalciferol 125 MCG (5000 UT) TABS, Take 5,000 Units by mouth in the morning, Disp: , Rfl:     Cholecalciferol 50 MCG (2000 UT) TABS, Take 1 tablet (2,000 Units total) by mouth daily (Patient not taking: Reported on 12/20/2023), Disp: , Rfl:     Empagliflozin (Jardiance) 25 MG TABS, Take 1 tablet (25 mg total) by mouth every morning, Disp: 90 tablet, Rfl: 2    hydrochlorothiazide (HYDRODIURIL) 25 mg tablet, Take 25 mg by mouth daily, Disp: , Rfl:     losartan (COZAAR) 100 MG tablet, Take 1 tablet (100 mg total) by mouth daily, Disp: , Rfl:     metFORMIN (GLUCOPHAGE) 500 mg tablet, Take 1,000 mg by mouth 2 (two) times a day with meals, Disp: , Rfl:     metoprolol succinate (TOPROL-XL) 100 mg 24 hr tablet, Take 100 mg by mouth every evening, Disp: , Rfl:     Multiple Vitamins-Minerals (Centrum Silver  50+Men) TABS, , Disp: , Rfl:     predniSONE 20 mg tablet, Take 40 mg by mouth daily, Disp: , Rfl:     rosuvastatin (CRESTOR) 40 MG tablet, Take 40 mg by mouth every evening, Disp: , Rfl:     simvastatin (ZOCOR) 40 mg tablet, Take 40 mg by mouth daily at bedtime, Disp: , Rfl:   No current facility-administered medications for this visit.  Family History   Problem Relation Age of Onset    Cancer Father         Leukemia      Review of Systems   Constitutional:  Negative for chills and fever.         Objective:  /56   Pulse 69   Temp 97.9 °F (36.6 °C)   Resp 18     Physical Exam  Neurological:      Mental Status: He is alert.             Wound 09/01/22 Incision Chest Anterior;Right (Active)       Wound 03/14/23 Chest Right (Active)       Wound 09/07/23 Diabetic Ulcer Foot Right;Plantar;Posterior (Active)   Enter Singh score: Singh Grade 1: Partial or full-thickness ulcer (superficial) 02/29/24 1400   Wound Image   03/07/24 1406   Wound Description Pink;Yellow 03/07/24 1354   Delisa-wound Assessment Maceration;Callus 03/07/24 1354   Wound Length (cm) 1.4 cm 03/07/24 1354   Wound Width (cm) 1.2 cm 03/07/24 1354   Wound Depth (cm) 0.1 cm 03/07/24 1354   Wound Surface Area (cm^2) 1.68 cm^2 03/07/24 1354   Wound Volume (cm^3) 0.168 cm^3 03/07/24 1354   Calculated Wound Volume (cm^3) 0.17 cm^3 03/07/24 1354   Change in Wound Size % 83 03/07/24 1354   Tunneling 1 in depth located at 1-12 o'clock 10/05/23 1348   Number of underminings 1 02/15/24 1344   Undermining 1 0.4 02/15/24 1344   Undermining 1 is depth extending from 9-12 02/15/24 1344   Drainage Amount Small 03/07/24 1354   Drainage Description Serosanguineous;Yellow 03/07/24 1354   Non-staged Wound Description Full thickness 03/07/24 1354   Dressing Status Intact 03/07/24 1354                         Debridement   Wound 09/07/23 Diabetic Ulcer Foot Right;Plantar;Posterior    Universal Protocol:  Consent: Verbal consent obtained.  Consent given by:  "patient  Patient understanding: patient states understanding of the procedure being performed  Patient identity confirmed: verbally with patient    Debridement Details  Performed by: physician  Debridement type: surgical  Level of debridement: subcutaneous tissue  Pain control: lidocaine 1%      Post-debridement measurements  Length (cm): 1.4  Width (cm): 1.3  Depth (cm): 0.1  Percent debrided: 100%  Surface Area (cm^2): 1.82  Area Debrided (cm^2): 1.82  Volume (cm^3): 0.18    Tissue and other material debrided: subcutaneous tissue  Devitalized tissue debrided: slough  Instrument(s) utilized: curette  Bleeding: small  Hemostasis obtained with: pressure  Procedural pain (0-10): insensate  Post-procedural pain: insensate   Response to treatment: procedure was tolerated well                 Wound Instructions:  Orders Placed This Encounter   Procedures    Wound cleansing and dressings Diabetic Ulcer Right;Plantar;Posterior Foot     · Wound cleansing and dressings                           Right Foot Wound      Wash your hands with soap and water. Remove old dressing, discard into plastic bag and place in trash. Cleanse the wound with Dakins solution 5 to 10 minute soak then remove wet gauze and pat dry prior to applying a clean dressing. Do not use tissue or cotton balls. Do not scrub the wound.      Shower: no cant get wet in shower     Apply skin prep to skin surrounding wound      Place double felt pad to help offload pressure.     Apply Puracol ag to the foot wound.   Cover with abd pad Secure with buffy and tape.  Change dressing three times a week     Standing Status:   Future     Standing Expiration Date:   3/7/2025         Susie Luu DPM      Portions of the record may have been created with voice recognition software. Occasional wrong word or \"sound a like\" substitutions may have occurred due to the inherent limitations of voice recognition software. Read the chart carefully and recognize, using " context, where substitutions have occurred.

## 2024-03-07 NOTE — PATIENT INSTRUCTIONS
Orders Placed This Encounter   Procedures    Wound cleansing and dressings Diabetic Ulcer Right;Plantar;Posterior Foot     · Wound cleansing and dressings                           Right Foot Wound      Wash your hands with soap and water. Remove old dressing, discard into plastic bag and place in trash. Cleanse the wound with Dakins solution 5 to 10 minute soak then remove wet gauze and pat dry prior to applying a clean dressing. Do not use tissue or cotton balls. Do not scrub the wound.      Shower: no cant get wet in shower     Apply skin prep to skin surrounding wound      Place double felt pad to help offload pressure.     Apply Puracol ag to the foot wound.   Cover with abd pad Secure with buffy and tape.  Change dressing three times a week     Standing Status:   Future     Standing Expiration Date:   3/7/2025

## 2024-03-08 ENCOUNTER — TELEMEDICINE (OUTPATIENT)
Dept: HEMATOLOGY ONCOLOGY | Facility: CLINIC | Age: 69
End: 2024-03-08
Payer: MEDICARE

## 2024-03-08 DIAGNOSIS — C82.08 FOLLICULAR LYMPHOMA GRADE I OF LYMPH NODES OF MULTIPLE SITES (HCC): ICD-10-CM

## 2024-03-08 DIAGNOSIS — C48.1: Primary | ICD-10-CM

## 2024-03-08 PROCEDURE — G2211 COMPLEX E/M VISIT ADD ON: HCPCS | Performed by: INTERNAL MEDICINE

## 2024-03-08 PROCEDURE — 99214 OFFICE O/P EST MOD 30 MIN: CPT | Performed by: INTERNAL MEDICINE

## 2024-03-11 ENCOUNTER — OFFICE VISIT (OUTPATIENT)
Dept: FAMILY MEDICINE CLINIC | Facility: CLINIC | Age: 69
End: 2024-03-11
Payer: MEDICARE

## 2024-03-11 VITALS
OXYGEN SATURATION: 99 % | BODY MASS INDEX: 36.96 KG/M2 | HEART RATE: 78 BPM | TEMPERATURE: 98.4 F | WEIGHT: 288 LBS | DIASTOLIC BLOOD PRESSURE: 72 MMHG | HEIGHT: 74 IN | SYSTOLIC BLOOD PRESSURE: 118 MMHG

## 2024-03-11 DIAGNOSIS — E83.52 HYPERCALCEMIA: ICD-10-CM

## 2024-03-11 DIAGNOSIS — L97.512 DIABETIC ULCER OF RIGHT FOOT ASSOCIATED WITH DIABETES MELLITUS DUE TO UNDERLYING CONDITION, WITH FAT LAYER EXPOSED, UNSPECIFIED PART OF FOOT (HCC): ICD-10-CM

## 2024-03-11 DIAGNOSIS — E11.59 TYPE 2 DIABETES MELLITUS WITH OTHER CIRCULATORY COMPLICATION, WITHOUT LONG-TERM CURRENT USE OF INSULIN (HCC): ICD-10-CM

## 2024-03-11 DIAGNOSIS — L97.919 CHRONIC VENOUS HYPERTENSION (IDIOPATHIC) WITH ULCER OF RIGHT LOWER EXTREMITY (HCC): ICD-10-CM

## 2024-03-11 DIAGNOSIS — E66.01 OBESITY, MORBID (HCC): ICD-10-CM

## 2024-03-11 DIAGNOSIS — E08.621 DIABETIC ULCER OF RIGHT FOOT ASSOCIATED WITH DIABETES MELLITUS DUE TO UNDERLYING CONDITION, WITH FAT LAYER EXPOSED, UNSPECIFIED PART OF FOOT (HCC): ICD-10-CM

## 2024-03-11 DIAGNOSIS — C48.1: ICD-10-CM

## 2024-03-11 DIAGNOSIS — I87.311 CHRONIC VENOUS HYPERTENSION (IDIOPATHIC) WITH ULCER OF RIGHT LOWER EXTREMITY (HCC): ICD-10-CM

## 2024-03-11 DIAGNOSIS — E11.42 DIABETIC PERIPHERAL NEUROPATHY (HCC): ICD-10-CM

## 2024-03-11 DIAGNOSIS — Z76.89 ENCOUNTER TO ESTABLISH CARE WITH NEW DOCTOR: Primary | ICD-10-CM

## 2024-03-11 LAB
LEFT EYE DIABETIC RETINOPATHY: NORMAL
LEFT EYE IMAGE QUALITY: NORMAL
LEFT EYE MACULAR EDEMA: NORMAL
LEFT EYE OTHER RETINOPATHY: NORMAL
RIGHT EYE DIABETIC RETINOPATHY: NORMAL
RIGHT EYE IMAGE QUALITY: NORMAL
RIGHT EYE MACULAR EDEMA: NORMAL
RIGHT EYE OTHER RETINOPATHY: NORMAL
SEVERITY (EYE EXAM): NORMAL

## 2024-03-11 PROCEDURE — 99204 OFFICE O/P NEW MOD 45 MIN: CPT | Performed by: FAMILY MEDICINE

## 2024-03-11 NOTE — PROGRESS NOTES
Name: Augustus Coffman      : 1955      MRN: [id number]  Encounter Provider: Gwen Lazo DO  Encounter Date: 3/11/2024   Encounter department: [facility] [address]    Assessment & Plan     1. Encounter to establish care with new doctor    2. 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HCC)    3. Chronic venous hypertension (idiopathic) with ulcer of right lower extremity (HCC)  -     Lipid panel; Future    4. Obesity, morbid (HCC)    5. Type 2 diabetes mellitus with other circulatory complication, without long-term current use of insulin (HCC)  -     Lipid panel; Future  -     IRIS Diabetic eye exam    6. Hypercalcemia  -     Comprehensive metabolic panel; Future    7. Diabetic ulcer of right foot associated with diabetes mellitus due to underlying condition, with fat layer exposed, unspecified part of foot (HCC)    8. Diabetic peripheral neuropathy (HCC)    Following with wound care weekly with improvement in wound, will complete DM foot exam in 3 months at follow up.        Subjective     HPI    Patient presents to the office to establish care. Not due until May 1 for AWV.     Labs completed on 3/2/24 with Hgb A1c of 6.7 (down from 8.4). CBC, CMP, lipids from 3/8/24 pending.     2024 was last eye exam.   Notes that he has had a foot wound for about 1 year. States that he is following with wound care.     Following with weight management. States that he is down about 20 lbs, BP has improved. Feeling very motivated to continue.     Has a DM wound on his right forefoot, following with Dr. Luu weekly.     Following with Dr. Heath from Nephrology.      Review of Systems    Past Medical History:   Diagnosis Date   • Chronic kidney disease    • Diabetes mellitus (HCC)    • Follicular lymphoma (HCC)    • High cholesterol    • Hypertension      Past Surgical History:   Procedure Laterality Date   • BUNIONECTOMY Right 2020    Procedure: RIGHT HAV CORRECTION,;   Surgeon: Niranjan Child DPM;  [facility];  Service: Podiatry   • COLONOSCOPY     • INCISION AND DRAINAGE OF WOUND Right 10/05/2016    Procedure: INCISION AND DRAINAGE (I&D) EXTREMITY;  Surgeon: Niranjan Child DPM;  [facility];  Service:    • IR BIOPSY LYMPH NODE  07/08/2021   • IR EMBOLIZATION (SPECIFY VESSEL OR SITE)  07/08/2021   • IR PORT PLACEMENT  9/1/2022   • PILONIDAL CYST EXCISION     • AZ CORRECTION HAMMERTOE Right 02/19/2019    Procedure: THIRD HAMMER TOE CORRECTION;  Surgeon: Niranjan Child DPM;  [facility];  Service: Podiatry   • AZ RMVL MATEO CTR VAD W/SUBQ PORT/ CTR/PRPH INSJ N/A 3/14/2023    Procedure: REMOVAL VENOUS PORT (PORT-A-CATH)IR;  Surgeon: Avelino Vázquez DO;  [facility];  Service: Interventional Radiology   • TOE OSTEOTOMY Right 03/14/2017    Procedure: HAMMERTOE CORRECTION R 2 ;  Surgeon: Niranjan Child DPM;  [facility];  Service:    • TOE OSTEOTOMY Left 11/24/2020    Procedure: LEFT HT CORRECTION TOE;  Surgeon: Niranjan Child DPM;  [facility];  Service: Podiatry   • TONSILLECTOMY  1963     Family History   Problem Relation Age of Onset   • Cancer Father         Leukemia     Social History     Socioeconomic History   • Marital status: /Civil Union     Spouse name: None   • Number of children: None   • Years of education: None   • Highest education level: None   Occupational History   • None   Tobacco Use   • Smoking status: Never   • Smokeless tobacco: Never   • Tobacco comments:     Never smoked but exposed to second hand smoke from birth until 1980's   Vaping Use   • Vaping status: Never Used   Substance and Sexual Activity   • Alcohol use: No   • Drug use: No   • Sexual activity: Not Currently     Partners: Female     Birth control/protection: Abstinence   Other Topics Concern   • None   Social History Narrative   • None     Social Determinants of Health     Financial Resource Strain: Not on file   Food Insecurity: Not on  "file   Transportation Needs: Not on file   Physical Activity: Not on file   Stress: Not on file   Social Connections: Not on file   Intimate Partner Violence: Not on file   Housing Stability: Not on file     Current Outpatient Medications on File Prior to Visit   Medication Sig   • amLODIPine (NORVASC) 10 mg tablet take 1 tablet by mouth once daily AT NOON   • aspirin 81 mg chewable tablet Chew 81 mg daily   • Cholecalciferol 125 MCG (5000 UT) TABS Take 5,000 Units by mouth in the morning   • hydrochlorothiazide (HYDRODIURIL) 25 mg tablet Take 25 mg by mouth daily   • losartan (COZAAR) 100 MG tablet Take 1 tablet (100 mg total) by mouth daily   • metFORMIN (GLUCOPHAGE) 500 mg tablet Take 1,000 mg by mouth 2 (two) times a day with meals   • metoprolol succinate (TOPROL-XL) 100 mg 24 hr tablet Take 100 mg by mouth every evening   • Multiple Vitamins-Minerals (Centrum Silver 50+Men) TABS    • rosuvastatin (CRESTOR) 40 MG tablet Take 40 mg by mouth every evening   • simvastatin (ZOCOR) 40 mg tablet Take 40 mg by mouth daily at bedtime   • Cholecalciferol 50 MCG (2000 UT) TABS Take 1 tablet (2,000 Units total) by mouth daily (Patient not taking: Reported on 12/20/2023)   • Empagliflozin (Jardiance) 25 MG TABS Take 1 tablet (25 mg total) by mouth every morning   • predniSONE 20 mg tablet Take 40 mg by mouth daily (Patient not taking: Reported on 3/11/2024)     Allergies   Allergen Reactions   • Lisinopril Cough     Immunization History   Administered Date(s) Administered   • COVID-19 MODERNA VACC 0.5 ML IM 04/09/2021, 05/07/2021, 12/04/2021   • INFLUENZA 10/31/2016, 10/16/2017, 10/29/2018, 11/04/2019   • Influenza Injectable, MDCK, Preservative Free, Quadrivalent, 0.5 mL 11/04/2019   • Influenza, injectable, quadrivalent, preservative free 0.5 mL 10/29/2018     Objective     /72 (BP Location: Left arm, Patient Position: Sitting, Cuff Size: Large)   Pulse 78   Temp 98.4 °F (36.9 °C) (Tympanic)   Ht 6' 1.75\" (1.873 " m)   Wt 131 kg (288 lb)   SpO2 99%   BMI 37.23 kg/m²     Physical Exam  Vitals reviewed.   Constitutional:       General: He is not in acute distress.     Appearance: Normal appearance.   HENT:      Head: Normocephalic and atraumatic.      Right Ear: External ear normal.      Left Ear: External ear normal.      Nose: Nose normal.      Mouth/Throat:      Mouth: Mucous membranes are moist.   Eyes:      Extraocular Movements: Extraocular movements intact.      Conjunctiva/sclera: Conjunctivae normal.      Pupils: Pupils are equal, round, and reactive to light.   Cardiovascular:      Rate and Rhythm: Normal rate and regular rhythm.      Heart sounds: Murmur heard.   Pulmonary:      Breath sounds: Normal breath sounds.   Abdominal:      General: Bowel sounds are normal. There is no distension.      Palpations: Abdomen is soft. There is no mass.      Tenderness: There is no abdominal tenderness. There is no guarding.   Musculoskeletal:      Right lower leg: No edema.      Left lower leg: No edema.   Skin:     General: Skin is warm.      Capillary Refill: Capillary refill takes less than 2 seconds.   Neurological:      Mental Status: He is alert. Mental status is at baseline.       Foot exam deferred today, will do in 3 months.       Gwen Lazo, DO

## 2024-03-12 ENCOUNTER — TELEPHONE (OUTPATIENT)
Dept: ADMINISTRATIVE | Facility: OTHER | Age: 69
End: 2024-03-12

## 2024-03-12 NOTE — TELEPHONE ENCOUNTER
Upon review of the In Basket request and the patient's chart, initial outreach has been made via fax to facility. Please see Contacts section for details.     Thank you  Denisha Youngblood

## 2024-03-12 NOTE — LETTER
Procedure Request Form: Wellness       Date Requested: 24  Patient: Augustus Coffman  Patient : 1955   Referring Provider: Gwen Lazo, DO        Date of Procedure ______________________________       The above patient has informed us that they have completed their   most recent Wellness at your facility. Please complete   this form and attach all corresponding procedure reports/results.    Comments __________________________________________________________  ____________________________________________________________________  ____________________________________________________________________  ____________________________________________________________________    Facility Completing Procedure _________________________________________    Form Completed By (print name) _______________________________________      Signature __________________________________________________________      These reports are needed for  compliance.    Please fax this completed form and a copy of the procedure report to our office located at [address] [place] [address] as soon as possible to Fax [phone] ashley Denny: Phone [phone]    We thank you for your assistance in treating our mutual patient.

## 2024-03-12 NOTE — LETTER
Procedure Request Form: Medicare Annual Wellness Visit (AWV)      Date Requested: 24  Patient: Augustus Coffman  Patient : 1955   Referring Provider: Gwen Lazo DO        Date of Procedure ______________________________       The above patient has informed us that they have completed their   most recent Medicare Annual Wellness Visit (AWV) at your facility. Please complete   this form and attach all corresponding procedure reports/results.    Comments __________________________________________________________  ____________________________________________________________________  ____________________________________________________________________  ____________________________________________________________________    Facility Completing Procedure _________________________________________    Form Completed By (print name) _______________________________________      Signature __________________________________________________________      These reports are needed for  compliance.    Please fax this completed form and a copy of the procedure report to our office located at [address] [place] [address] as soon as possible to Fax [phone] ashley Denny: Phone [phone]    We thank you for your assistance in treating our mutual patient.

## 2024-03-12 NOTE — TELEPHONE ENCOUNTER
----- Message from Lauren Santos sent at 3/11/2024  1:31 PM EDT -----  Regarding: Care Gap Request  03/11/24 1:31 PM    Hello, our patient listed above has had Medicare AWV completed/performed. Please assist in updating the patient chart by making an External outreach to Dr Segal facility located in [address], [place] [address]. The date of service is 05/01/2023.    Thank you,  Lauren LAYNE [address] [place] [address]

## 2024-03-13 ENCOUNTER — CLINICAL SUPPORT (OUTPATIENT)
Dept: BARIATRICS | Facility: CLINIC | Age: 69
End: 2024-03-13

## 2024-03-13 VITALS — WEIGHT: 285.6 LBS | BODY MASS INDEX: 36.65 KG/M2 | HEIGHT: 74 IN

## 2024-03-13 DIAGNOSIS — R63.5 ABNORMAL WEIGHT GAIN: Primary | ICD-10-CM

## 2024-03-13 PROCEDURE — RECHECK

## 2024-03-14 ENCOUNTER — OFFICE VISIT (OUTPATIENT)
Dept: WOUND CARE | Facility: CLINIC | Age: 69
End: 2024-03-14
Payer: MEDICARE

## 2024-03-14 VITALS
RESPIRATION RATE: 18 BRPM | HEART RATE: 68 BPM | SYSTOLIC BLOOD PRESSURE: 122 MMHG | DIASTOLIC BLOOD PRESSURE: 61 MMHG | TEMPERATURE: 97.8 F

## 2024-03-14 DIAGNOSIS — L97.512 DIABETIC ULCER OF OTHER PART OF RIGHT FOOT ASSOCIATED WITH DIABETES MELLITUS DUE TO UNDERLYING CONDITION, WITH FAT LAYER EXPOSED (HCC): Primary | ICD-10-CM

## 2024-03-14 DIAGNOSIS — E08.621 DIABETIC ULCER OF OTHER PART OF RIGHT FOOT ASSOCIATED WITH DIABETES MELLITUS DUE TO UNDERLYING CONDITION, WITH FAT LAYER EXPOSED (HCC): Primary | ICD-10-CM

## 2024-03-14 PROCEDURE — 15275 SKIN SUB GRAFT FACE/NK/HF/G: CPT | Performed by: PODIATRIST

## 2024-03-14 RX ORDER — LIDOCAINE 40 MG/G
CREAM TOPICAL ONCE
Status: COMPLETED | OUTPATIENT
Start: 2024-03-14 | End: 2024-03-14

## 2024-03-14 RX ADMIN — LIDOCAINE: 40 CREAM TOPICAL at 14:02

## 2024-03-14 NOTE — PATIENT INSTRUCTIONS
Orders Placed This Encounter   Procedures    Wound cleansing and dressings Diabetic Ulcer Right;Plantar;Posterior Foot     Right Foot Wound      Wash your hands with soap and water.  Remove old dressing, discard into plastic bag and place in trash.  Cleanse the wound with NSS prior to applying a clean dressing. Do not use tissue or cotton balls. Do not scrub the wound. Pat dry using gauze.    Shower no can not get wet    Apply skin prep to skin surrounding wound  Apply Grafix PL to the foot wound.  Cover with mepitel and secured with steri strips  Applied bulky dressing to help offload  Change dressing weekly at wound center    If dressing becomes wet with drainage may change but do not remove the steristrip area only change bulky dressing     Standing Status:   Future     Standing Expiration Date:   3/14/2025    Wound off loading Diabetic Ulcer Right;Plantar;Posterior Foot     Need to keep all weight off of wound, need to use crutches if ambulating     Standing Status:   Future     Standing Expiration Date:   3/14/2025

## 2024-03-14 NOTE — PROGRESS NOTES
Patient ID: Augustus Coffman is a 68 y.o. male Date of Birth 1955       Chief Complaint   Patient presents with    Follow Up Wound Care Visit     Diabetic ulcer right foot       Allergies:  Lisinopril    Diagnosis:  1. Diabetic ulcer of other part of right foot associated with diabetes mellitus due to underlying condition, with fat layer exposed (HCC)  -     lidocaine (LMX) 4 % cream  -     Wound cleansing and dressings Diabetic Ulcer Right;Plantar;Posterior Foot; Future  -     Wound off loading Diabetic Ulcer Right;Plantar;Posterior Foot; Future       Diagnosis ICD-10-CM Associated Orders   1. Diabetic ulcer of other part of right foot associated with diabetes mellitus due to underlying condition, with fat layer exposed (HCC)  E08.621 lidocaine (LMX) 4 % cream    L97.512 Wound cleansing and dressings Diabetic Ulcer Right;Plantar;Posterior Foot     Wound off loading Diabetic Ulcer Right;Plantar;Posterior Foot           Assessment & Plan:  See wound orders.    Diabetic ulcer right foot associated with DM to fat layer.  The patient was treated with application of skin substitute today.  Will plan for sequential applications as appropriate with monitoring of wound for improvement weekly.  Keep dressing clean and dry.  Do not walk on the foot.  Keep blood sugar low and continue weight management type diet.  Follow up one week.    Subjective:   The patient is seen for follow up ulcer right foot.  No new complaints.        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HCC)    Hypertension    Hypercholesteremia    Hammer toe of right foot    Stasis dermatitis of both legs    Diabetic peripheral neuropathy (HCC)    Gout    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HCC)    Obesity with body mass index 30 or greater    Type 2 diabetes mellitus (HCC)    Retroperitoneal hematoma    Mesenteric lymphadenopathy     Acute blood loss anemia    Heart murmur    GI bleed    Pleural effusion    Chronic venous hypertension (idiopathic) with ulcer of right lower extremity (HCC)    Rash    Stage 2 chronic kidney disease    Hypertensive kidney disease with stage 3 chronic kidney disease (HCC)    Other proteinuria    Obesity, morbid (HCC)    Follicular lymphoma grade I of lymph nodes of multiple sites (HCC)    Vitamin D deficiency    Stage 3a chronic kidney disease (HCC)    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 (HCC)     Past Medical History:   Diagnosis Date    Chronic kidney disease     Diabetes mellitus (HCC)     Follicular lymphoma (HCC)     High cholesterol     Hypertension      Past Surgical History:   Procedure Laterality Date    BUNIONECTOMY Right 11/24/2020    Procedure: RIGHT HAV CORRECTION,;  Surgeon: Niranjan Child DPM;  [facility];  Service: Podiatry    COLONOSCOPY      INCISION AND DRAINAGE OF WOUND Right 10/05/2016    Procedure: INCISION AND DRAINAGE (I&D) EXTREMITY;  Surgeon: Niranjan Child DPM;  [facility];  Service:     IR BIOPSY LYMPH NODE  07/08/2021    IR EMBOLIZATION (SPECIFY VESSEL OR SITE)  07/08/2021    IR PORT PLACEMENT  9/1/2022    PILONIDAL CYST EXCISION      NM CORRECTION HAMMERTOE Right 02/19/2019    Procedure: THIRD HAMMER TOE CORRECTION;  Surgeon: Niranjan Child DPM;  [facility];  Service: Podiatry    NM RMVL MATEO CTR VAD W/SUBQ PORT/ CTR/PRPH INSJ N/A 3/14/2023    Procedure: REMOVAL VENOUS PORT (PORT-A-CATH)IR;  Surgeon: Avelino Vázquez DO;  [facility];  Service: Interventional Radiology    TOE OSTEOTOMY Right 03/14/2017    Procedure: HAMMERTOE CORRECTION R 2 ;  Surgeon: Niranjan Child DPM;  [facility];  Service:     TOE OSTEOTOMY Left 11/24/2020    Procedure: LEFT HT CORRECTION TOE;  Surgeon: Niranjan Child DPM;  [facility];  Service: Podiatry    TONSILLECTOMY  1963     Social History     Socioeconomic History    Marital status: /Civil Union      Spouse name: None    Number of children: None    Years of education: None    Highest education level: None   Occupational History    None   Tobacco Use    Smoking status: Never    Smokeless tobacco: Never    Tobacco comments:     Never smoked but exposed to second hand smoke from birth until 1980's   Vaping Use    Vaping status: Never Used   Substance and Sexual Activity    Alcohol use: No    Drug use: No    Sexual activity: Not Currently     Partners: Female     Birth control/protection: Abstinence   Other Topics Concern    None   Social History Narrative    None     Social Determinants of Health     Financial Resource Strain: Not on file   Food Insecurity: Not on file   Transportation Needs: Not on file   Physical Activity: Not on file   Stress: Not on file   Social Connections: Not on file   Intimate Partner Violence: Not on file   Housing Stability: Not on file        Current Outpatient Medications:     amLODIPine (NORVASC) 10 mg tablet, take 1 tablet by mouth once daily AT NOON, Disp: , Rfl:     aspirin 81 mg chewable tablet, Chew 81 mg daily, Disp: , Rfl:     Cholecalciferol 125 MCG (5000 UT) TABS, Take 5,000 Units by mouth in the morning, Disp: , Rfl:     Cholecalciferol 50 MCG (2000 UT) TABS, Take 1 tablet (2,000 Units total) by mouth daily (Patient not taking: Reported on 12/20/2023), Disp: , Rfl:     Empagliflozin (Jardiance) 25 MG TABS, Take 1 tablet (25 mg total) by mouth every morning, Disp: 90 tablet, Rfl: 2    hydrochlorothiazide (HYDRODIURIL) 25 mg tablet, Take 25 mg by mouth daily, Disp: , Rfl:     losartan (COZAAR) 100 MG tablet, Take 1 tablet (100 mg total) by mouth daily, Disp: , Rfl:     metFORMIN (GLUCOPHAGE) 500 mg tablet, Take 1,000 mg by mouth 2 (two) times a day with meals, Disp: , Rfl:     metoprolol succinate (TOPROL-XL) 100 mg 24 hr tablet, Take 100 mg by mouth every evening, Disp: , Rfl:     Multiple Vitamins-Minerals (Centrum Silver 50+Men) TABS, , Disp: , Rfl:     predniSONE 20 mg  tablet, Take 40 mg by mouth daily (Patient not taking: Reported on 3/11/2024), Disp: , Rfl:     rosuvastatin (CRESTOR) 40 MG tablet, Take 40 mg by mouth every evening, Disp: , Rfl:     simvastatin (ZOCOR) 40 mg tablet, Take 40 mg by mouth daily at bedtime, Disp: , Rfl:   No current facility-administered medications for this visit.  Family History   Problem Relation Age of Onset    Cancer Father         Leukemia      Review of Systems   Constitutional:  Negative for chills and fever.         Objective:  /61   Pulse 68   Temp 97.8 °F (36.6 °C)   Resp 18     Physical Exam  Neurological:      Mental Status: He is alert.             Wound 09/01/22 Incision Chest Anterior;Right (Active)       Wound 03/14/23 Chest Right (Active)       Wound 09/07/23 Diabetic Ulcer Foot Right;Plantar;Posterior (Active)   Enter Singh score: Singh Grade 1: Partial or full-thickness ulcer (superficial) 03/14/24 1400   Wound Image   03/14/24 1400   Wound Description Pink;Yellow 03/14/24 1400   Delisa-wound Assessment Maceration;Callus 03/14/24 1400   Wound Length (cm) 1.4 cm 03/14/24 1400   Wound Width (cm) 1.2 cm 03/14/24 1400   Wound Depth (cm) 0.1 cm 03/14/24 1400   Wound Surface Area (cm^2) 1.68 cm^2 03/14/24 1400   Wound Volume (cm^3) 0.168 cm^3 03/14/24 1400   Calculated Wound Volume (cm^3) 0.17 cm^3 03/14/24 1400   Change in Wound Size % 83 03/14/24 1400   Tunneling 1 in depth located at 1-12 o'clock 10/05/23 1348   Number of underminings 1 02/15/24 1344   Undermining 1 0.4 02/15/24 1344   Undermining 1 is depth extending from 9-12 02/15/24 1344   Drainage Amount Moderate 03/14/24 1400   Drainage Description Serosanguineous;Yellow 03/14/24 1400   Non-staged Wound Description Full thickness 03/14/24 1400   Dressing Status Intact 03/14/24 1400                         Skin Substitute    Universal Protocol:  Consent: Verbal consent obtained.  Consent given by: patient  Patient understanding: patient states understanding of the  "procedure being performed  Patient identity confirmed: verbally with patient  Performed by: Physician  Product: Grafix PL.    Application Location and Measurements  Location: genitalia / hands / feet / multiple digits  Skin Sub Lot #: [id number]  Skin Sub Expiration: 21-  Area (sq cm): 1.68  Product Applied (sq cm): 1.6  Product Wasted (sq cm): 0    Application Details  Fenestrated: No  Secured: Yes  Secured With: Steri-Strips  Dressing Applied: Yes  Dressing Comments: mepitel  Procedural pain (0-10): insensate  Post-procedural pain: insensate   Response to treatment: procedure was tolerated well                   Wound Instructions:  Orders Placed This Encounter   Procedures    Wound cleansing and dressings Diabetic Ulcer Right;Plantar;Posterior Foot     Right Foot Wound      Wash your hands with soap and water.  Remove old dressing, discard into plastic bag and place in trash.  Cleanse the wound with NSS prior to applying a clean dressing. Do not use tissue or cotton balls. Do not scrub the wound. Pat dry using gauze.    Shower no can not get wet    Apply skin prep to skin surrounding wound  Apply Grafix PL to the foot wound.  Cover with mepitel and secured with steri strips  Applied bulky dressing to help offload  Change dressing weekly at wound center    If dressing becomes wet with drainage may change but do not remove the steristrip area only change bulky dressing     Standing Status:   Future     Standing Expiration Date:   3/14/2025    Wound off loading Diabetic Ulcer Right;Plantar;Posterior Foot     Need to keep all weight off of wound, need to use crutches if ambulating     Standing Status:   Future     Standing Expiration Date:   3/14/2025         Susie Luu DPM      Portions of the record may have been created with voice recognition software. Occasional wrong word or \"sound a like\" substitutions may have occurred due to the inherent limitations of voice recognition software. Read " the chart carefully and recognize, using context, where substitutions have occurred.

## 2024-03-18 ENCOUNTER — TELEPHONE (OUTPATIENT)
Dept: FAMILY MEDICINE CLINIC | Facility: CLINIC | Age: 69
End: 2024-03-18

## 2024-03-18 NOTE — TELEPHONE ENCOUNTER
Upon review of the In Basket request we were able to locate, review, and update the patient chart as requested for Medicare AW.    Any additional questions or concerns should be emailed to the Practice Liaisons via the appropriate education email address, please do not reply via In Basket.    Thank you  Denisha Youngblood

## 2024-03-18 NOTE — TELEPHONE ENCOUNTER
As a follow-up, a second attempt has been made for outreach via fax to facility. Please see Contacts section for details.    Thank you  Denisha Youngblood

## 2024-03-18 NOTE — TELEPHONE ENCOUNTER
Pt dropped off a DMV / for 's License paperwork for Dr Lazo to complete. Forms left in providers bin. Call pt for  completed.     Also pts reg docs updated .    Please advise

## 2024-03-21 ENCOUNTER — OFFICE VISIT (OUTPATIENT)
Dept: WOUND CARE | Facility: CLINIC | Age: 69
End: 2024-03-21
Payer: MEDICARE

## 2024-03-21 VITALS
HEART RATE: 63 BPM | TEMPERATURE: 97.1 F | DIASTOLIC BLOOD PRESSURE: 58 MMHG | RESPIRATION RATE: 18 BRPM | SYSTOLIC BLOOD PRESSURE: 116 MMHG

## 2024-03-21 DIAGNOSIS — E08.621 DIABETIC ULCER OF OTHER PART OF RIGHT FOOT ASSOCIATED WITH DIABETES MELLITUS DUE TO UNDERLYING CONDITION, WITH FAT LAYER EXPOSED (HCC): Primary | ICD-10-CM

## 2024-03-21 DIAGNOSIS — L97.512 DIABETIC ULCER OF OTHER PART OF RIGHT FOOT ASSOCIATED WITH DIABETES MELLITUS DUE TO UNDERLYING CONDITION, WITH FAT LAYER EXPOSED (HCC): Primary | ICD-10-CM

## 2024-03-21 PROCEDURE — 15275 SKIN SUB GRAFT FACE/NK/HF/G: CPT | Performed by: PODIATRIST

## 2024-03-21 NOTE — PROGRESS NOTES
"Patient ID: Augustus Coffman is a 68 y.o. male Date of Birth 1955       Chief Complaint   Patient presents with    Follow Up Wound Care Visit     Right foot diabetic ulcer       Allergies:  Lisinopril    Diagnosis:  1. Diabetic ulcer of other part of right foot associated with diabetes mellitus due to underlying condition, with fat layer exposed (HCC)  -     Wound cleansing and dressings Diabetic Ulcer Right;Plantar;Posterior Foot; Future  -     Wound off loading Diabetic Ulcer Right;Plantar;Posterior Foot; Future  -     Skin Substitute       Diagnosis ICD-10-CM Associated Orders   1. Diabetic ulcer of other part of right foot associated with diabetes mellitus due to underlying condition, with fat layer exposed (HCC)  E08.621 Wound cleansing and dressings Diabetic Ulcer Right;Plantar;Posterior Foot    L97.512 Wound off loading Diabetic Ulcer Right;Plantar;Posterior Foot     Skin Substitute           Assessment & Plan:  See wound orders.    Diabetic ulcer right foot to fat.  Singh 2 ulcer right foot.  The patient is advised to continue with weight loss and NWB right foot.  Patient is using his crutches faithfully.  Patient is a good candidate to continue with graffix prime applications in the wound center.  Whereas the skin substitute fit the size and shape of the wound perfectly last week, this week there is about 1 mm overlap on the entire perimeter indicating that the ulcer like likely 1-2 mm smaller in diameter than last week.      Subjective:   The patient is seen for follow up of graffix application right foot.  He has stayed off the foot and reported a \"boring\" week.  He is still going to weight management and notes blood sugars coming down.  The blood sugar today was 119.        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HCC)    Hypertension    Hypercholesteremia    Hammer toe " of right foot    Stasis dermatitis of both legs    Diabetic peripheral neuropathy (HCC)    Gout    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HCC)    Obesity with body mass index 30 or greater    Type 2 diabetes mellitus (HCC)    Retroperitoneal hematoma    Mesenteric lymphadenopathy    Acute blood loss anemia    Heart murmur    GI bleed    Pleural effusion    Chronic venous hypertension (idiopathic) with ulcer of right lower extremity (HCC)    Rash    Stage 2 chronic kidney disease    Hypertensive kidney disease with stage 3 chronic kidney disease (HCC)    Other proteinuria    Obesity, morbid (HCC)    Follicular lymphoma grade I of lymph nodes of multiple sites (HCC)    Vitamin D deficiency    Stage 3a chronic kidney disease (HCC)    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 (HCC)     Past Medical History:   Diagnosis Date    Chronic kidney disease     Diabetes mellitus (HCC)     Follicular lymphoma (HCC)     High cholesterol     Hypertension      Past Surgical History:   Procedure Laterality Date    BUNIONECTOMY Right 11/24/2020    Procedure: RIGHT HAV CORRECTION,;  Surgeon: Niranjan Child DPM;  [facility];  Service: Podiatry    COLONOSCOPY      INCISION AND DRAINAGE OF WOUND Right 10/05/2016    Procedure: INCISION AND DRAINAGE (I&D) EXTREMITY;  Surgeon: Niranjan Child DPM;  [facility];  Service:     IR BIOPSY LYMPH NODE  07/08/2021    IR EMBOLIZATION (SPECIFY VESSEL OR SITE)  07/08/2021    IR PORT PLACEMENT  9/1/2022    PILONIDAL CYST EXCISION      SD CORRECTION HAMMERTOE Right 02/19/2019    Procedure: THIRD HAMMER TOE CORRECTION;  Surgeon: Niranjan Child DPM;  [facility];  Service: Podiatry    SD RMVL MATEO CTR VAD W/SUBQ PORT/ CTR/PRPH INSJ N/A 3/14/2023    Procedure: REMOVAL VENOUS PORT (PORT-A-CATH)IR;  Surgeon: Avelino Vázquez DO;  [facility];  Service: Interventional Radiology    TOE OSTEOTOMY Right 03/14/2017    Procedure: HAMMERTOE CORRECTION R 2 ;  Surgeon: Niranjan Child DPM;  [facility];  Service:     TOE OSTEOTOMY Left 11/24/2020    Procedure: LEFT HT CORRECTION TOE;  Surgeon: Niranjan Child DPM;  [facility];  Service: Podiatry    TONSILLECTOMY  1963     Social History     Socioeconomic History    Marital status: /Civil Union     Spouse name: None    Number of children: None    Years of education: None    Highest education level: None   Occupational History    None   Tobacco Use    Smoking status: Never    Smokeless tobacco: Never    Tobacco comments:     Never smoked but exposed to second hand smoke from birth until 1980's   Vaping Use    Vaping status: Never Used   Substance and Sexual Activity    Alcohol use: No    Drug use: No    Sexual activity: Not Currently     Partners: Female     Birth control/protection: Abstinence   Other Topics Concern    None   Social History Narrative    None     Social Determinants of Health     Financial Resource Strain: Not on file   Food Insecurity: Not on file   Transportation Needs: Not on file   Physical Activity: Not on file   Stress: Not on file   Social Connections: Not on file   Intimate Partner Violence: Not on file   Housing Stability: Not on file        Current Outpatient Medications:     amLODIPine (NORVASC) 10 mg tablet, take 1 tablet by mouth once daily AT NOON, Disp: , Rfl:     aspirin 81 mg chewable tablet, Chew 81 mg daily, Disp: , Rfl:     Cholecalciferol 125 MCG (5000 UT) TABS, Take 5,000 Units by mouth in the morning, Disp: , Rfl:     Cholecalciferol 50 MCG (2000 UT) TABS, Take 1 tablet (2,000 Units total) by mouth daily (Patient not taking: Reported on 12/20/2023), Disp: , Rfl:     Empagliflozin (Jardiance) 25 MG TABS, Take 1 tablet (25 mg total) by mouth every morning, Disp: 90 tablet, Rfl: 2    hydrochlorothiazide (HYDRODIURIL) 25 mg tablet, Take 25 mg by mouth daily, Disp: , Rfl:     losartan (COZAAR) 100 MG tablet, Take 1 tablet (100 mg total) by mouth daily, Disp: , Rfl:     metFORMIN (GLUCOPHAGE) 500 mg tablet,  Take 1,000 mg by mouth 2 (two) times a day with meals, Disp: , Rfl:     metoprolol succinate (TOPROL-XL) 100 mg 24 hr tablet, Take 100 mg by mouth every evening, Disp: , Rfl:     Multiple Vitamins-Minerals (Centrum Silver 50+Men) TABS, , Disp: , Rfl:     predniSONE 20 mg tablet, Take 40 mg by mouth daily (Patient not taking: Reported on 3/11/2024), Disp: , Rfl:     rosuvastatin (CRESTOR) 40 MG tablet, Take 40 mg by mouth every evening, Disp: , Rfl:     simvastatin (ZOCOR) 40 mg tablet, Take 40 mg by mouth daily at bedtime, Disp: , Rfl:   Family History   Problem Relation Age of Onset    Cancer Father         Leukemia      Review of Systems   Constitutional:  Negative for chills and fever.         Objective:  /58   Pulse 63   Temp (!) 97.1 °F (36.2 °C)   Resp 18     Physical Exam  Neurological:      Mental Status: He is alert.             Wound 09/01/22 Incision Chest Anterior;Right (Active)       Wound 03/14/23 Chest Right (Active)       Wound 09/07/23 Diabetic Ulcer Foot Right;Plantar;Posterior (Active)   Enter Singh score: Singh Grade 1: Partial or full-thickness ulcer (superficial) 03/21/24 1355   Wound Image   03/21/24 1356   Wound Description Pink;Yellow 03/21/24 1355   Delisa-wound Assessment Maceration 03/21/24 1355   Wound Length (cm) 1.5 cm 03/21/24 1355   Wound Width (cm) 1.4 cm 03/21/24 1355   Wound Depth (cm) 0.1 cm 03/21/24 1355   Wound Surface Area (cm^2) 2.1 cm^2 03/21/24 1355   Wound Volume (cm^3) 0.21 cm^3 03/21/24 1355   Calculated Wound Volume (cm^3) 0.21 cm^3 03/21/24 1355   Change in Wound Size % 79 03/21/24 1355   Tunneling 1 in depth located at 1-12 o'clock 10/05/23 1348   Number of underminings 1 02/15/24 1344   Undermining 1 0.4 02/15/24 1344   Undermining 1 is depth extending from 9-12 02/15/24 1344   Drainage Amount Moderate 03/21/24 1355   Drainage Description Serosanguineous;Yellow 03/21/24 1355   Non-staged Wound Description Full thickness 03/21/24 1355   Dressing Status Intact  03/14/24 1400                         Skin Substitute    Universal Protocol:  Consent: Verbal consent obtained.  Consent given by: patient  Patient understanding: patient states understanding of the procedure being performed  Patient identity confirmed: verbally with patient  Performed by: Physician  Product: Grafix Prime.    Application Location and Measurements  Location: genitalia / hands / feet / multiple digits  Skin Sub Lot #: [id number]  Skin Sub Expiration: 13-  Area (sq cm): 2.1  Product Applied (sq cm): 1.6  Product Wasted (sq cm): 0    Application Details  Fenestrated: No  Secured: Yes  Secured With: Steri-Strips  Dressing Applied: Yes  Dressing Comments: mepitel  Procedural pain (0-10): insensate  Post-procedural pain: insensate   Response to treatment: procedure was tolerated well   Comments: the overlap was not cut off but secured under the steri-strips this no wastage                 Wound Instructions:  Orders Placed This Encounter   Procedures    Wound cleansing and dressings Diabetic Ulcer Right;Plantar;Posterior Foot     · Wound cleansing and dressings Diabetic Ulcer Right;Plantar;Posterior Foot      Right Foot Wound        Wash your hands with soap and water.  Remove old dressing, discard into plastic bag and place in trash.  Cleanse the wound with NSS prior to applying a clean dressing. Do not use tissue or cotton balls. Do not scrub the wound. Pat dry using gauze.     Shower no can not get wet     Apply skin prep to skin surrounding wound  Apply Grafix PL to the foot wound.  Cover with mepitel and secured with steri strips  Applied bulky dressing to help offload  Change dressing weekly at wound center     If dressing becomes wet with drainage may change outer dressing only, do not remove the steristrip area only change bulky dressing     Standing Status:   Future     Standing Expiration Date:   3/21/2025    Wound off loading Diabetic Ulcer Right;Plantar;Posterior Foot     · Wound off  "loading Diabetic Ulcer Right;Plantar;Posterior Foot      Need to keep all weight off of wound, need to use crutches if ambulating     Standing Status:   Future     Standing Expiration Date:   3/21/2025    Skin Substitute     This order was created via procedure documentation         Susie Luu DPM      Portions of the record may have been created with voice recognition software. Occasional wrong word or \"sound a like\" substitutions may have occurred due to the inherent limitations of voice recognition software. Read the chart carefully and recognize, using context, where substitutions have occurred.     "

## 2024-03-21 NOTE — PATIENT INSTRUCTIONS
Orders Placed This Encounter   Procedures    Wound cleansing and dressings Diabetic Ulcer Right;Plantar;Posterior Foot     · Wound cleansing and dressings Diabetic Ulcer Right;Plantar;Posterior Foot      Right Foot Wound        Wash your hands with soap and water.  Remove old dressing, discard into plastic bag and place in trash.  Cleanse the wound with NSS prior to applying a clean dressing. Do not use tissue or cotton balls. Do not scrub the wound. Pat dry using gauze.     Shower no can not get wet     Apply skin prep to skin surrounding wound  Apply Grafix PL to the foot wound.  Cover with mepitel and secured with steri strips  Applied bulky dressing to help offload  Change dressing weekly at wound center     If dressing becomes wet with drainage may change outer dressing only, do not remove the steristrip area only change bulky dressing     Standing Status:   Future     Standing Expiration Date:   3/21/2025    Wound off loading Diabetic Ulcer Right;Plantar;Posterior Foot     · Wound off loading Diabetic Ulcer Right;Plantar;Posterior Foot      Need to keep all weight off of wound, need to use crutches if ambulating     Standing Status:   Future     Standing Expiration Date:   3/21/2025

## 2024-03-27 ENCOUNTER — CLINICAL SUPPORT (OUTPATIENT)
Dept: BARIATRICS | Facility: CLINIC | Age: 69
End: 2024-03-27

## 2024-03-27 VITALS — HEIGHT: 74 IN | WEIGHT: 282.6 LBS | BODY MASS INDEX: 36.27 KG/M2

## 2024-03-27 DIAGNOSIS — R63.5 ABNORMAL WEIGHT GAIN: Primary | ICD-10-CM

## 2024-03-27 PROCEDURE — RECHECK

## 2024-03-28 ENCOUNTER — OFFICE VISIT (OUTPATIENT)
Dept: WOUND CARE | Facility: CLINIC | Age: 69
End: 2024-03-28
Payer: MEDICARE

## 2024-03-28 VITALS
RESPIRATION RATE: 18 BRPM | TEMPERATURE: 96.8 F | SYSTOLIC BLOOD PRESSURE: 149 MMHG | DIASTOLIC BLOOD PRESSURE: 73 MMHG | HEART RATE: 66 BPM

## 2024-03-28 DIAGNOSIS — L97.512 DIABETIC ULCER OF OTHER PART OF RIGHT FOOT ASSOCIATED WITH DIABETES MELLITUS DUE TO UNDERLYING CONDITION, WITH FAT LAYER EXPOSED (HCC): Primary | ICD-10-CM

## 2024-03-28 DIAGNOSIS — E08.621 DIABETIC ULCER OF OTHER PART OF RIGHT FOOT ASSOCIATED WITH DIABETES MELLITUS DUE TO UNDERLYING CONDITION, WITH FAT LAYER EXPOSED (HCC): Primary | ICD-10-CM

## 2024-03-28 PROCEDURE — 15275 SKIN SUB GRAFT FACE/NK/HF/G: CPT | Performed by: PODIATRIST

## 2024-03-28 NOTE — PATIENT INSTRUCTIONS
Orders Placed This Encounter   Procedures    Wound cleansing and dressings Diabetic Ulcer Right;Plantar;Posterior Foot     Wound cleansing and dressings Diabetic Ulcer Right;Plantar;Posterior Foot                               Right Foot Wound     Wash your hands with soap and water.  Remove old dressing, discard into plastic bag and place in trash.  Cleanse the wound with NSS prior to applying a clean dressing. Do not use tissue or cotton balls. Do not scrub the wound. Pat dry using gauze.     Shower no can not get wet     Apply skin prep to skin surrounding wound  Apply Grafix PL to the foot wound.  Cover with mepitel and secured with steri strips  Applied bulky dressing to help offload  Change dressing weekly at wound center     If dressing becomes wet with drainage may change outer dressing only, do not remove the steristrip area only change bulky dressing     Standing Status:   Future     Standing Expiration Date:   3/28/2025    Wound off loading Diabetic Ulcer Right;Plantar;Posterior Foot     · Wound off loading Diabetic Ulcer Right;Plantar;Posterior Foot      ·           Wound off loading Diabetic Ulcer Right;Plantar;Posterior Foot                          Need to keep all weight off of wound, need to use crutches if ambulating     Standing Status:   Future     Standing Expiration Date:   3/28/2025

## 2024-03-28 NOTE — PROGRESS NOTES
Patient ID: Augustus Coffman is a 68 y.o. male Date of Birth 1955       Chief Complaint   Patient presents with    Follow Up Wound Care Visit     Right foot diabetic ulcer       Allergies:  Lisinopril    Diagnosis:  1. Diabetic ulcer of other part of right foot associated with diabetes mellitus due to underlying condition, with fat layer exposed (HCC)  -     Wound cleansing and dressings Diabetic Ulcer Right;Plantar;Posterior Foot; Future  -     Wound off loading Diabetic Ulcer Right;Plantar;Posterior Foot; Future       Diagnosis ICD-10-CM Associated Orders   1. Diabetic ulcer of other part of right foot associated with diabetes mellitus due to underlying condition, with fat layer exposed (HCC)  E08.621 Wound cleansing and dressings Diabetic Ulcer Right;Plantar;Posterior Foot    L97.512 Wound off loading Diabetic Ulcer Right;Plantar;Posterior Foot           Assessment & Plan:  See wound orders.    Diabetic ulcer right foot to fat/rosa 2-This wound is progressing well.  The site is shallow and may have a very thin epithelial layer throughout the wound bed.  Will proceed with skin substitute today and will plan for one next week.  Patient is re-educated on complete NWB on the RLE forefoot with crutches.     Subjective:   This patient has no new complaints this week.        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HCC)    Hypertension    Hypercholesteremia    Hammer toe of right foot    Stasis dermatitis of both legs    Diabetic peripheral neuropathy (HCC)    Gout    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HCC)    Obesity with body mass index 30 or greater    Type 2 diabetes mellitus (HCC)    Retroperitoneal hematoma    Mesenteric lymphadenopathy    Acute blood loss anemia    Heart murmur    GI bleed    Pleural effusion    Chronic venous hypertension (idiopathic) with ulcer of right lower  extremity (HCC)    Rash    Stage 2 chronic kidney disease    Hypertensive kidney disease with stage 3 chronic kidney disease (HCC)    Other proteinuria    Obesity, morbid (HCC)    Follicular lymphoma grade I of lymph nodes of multiple sites (HCC)    Vitamin D deficiency    Stage 3a chronic kidney disease (HCC)    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 (HCC)     Past Medical History:   Diagnosis Date    Chronic kidney disease     Diabetes mellitus (HCC)     Follicular lymphoma (HCC)     High cholesterol     Hypertension      Past Surgical History:   Procedure Laterality Date    BUNIONECTOMY Right 11/24/2020    Procedure: RIGHT HAV CORRECTION,;  Surgeon: Niranjan Child DPM;  [facility];  Service: Podiatry    COLONOSCOPY      INCISION AND DRAINAGE OF WOUND Right 10/05/2016    Procedure: INCISION AND DRAINAGE (I&D) EXTREMITY;  Surgeon: Niranjan Child DPM;  [facility];  Service:     IR BIOPSY LYMPH NODE  07/08/2021    IR EMBOLIZATION (SPECIFY VESSEL OR SITE)  07/08/2021    IR PORT PLACEMENT  9/1/2022    PILONIDAL CYST EXCISION      [place] CORRECTION HAMMERTOE Right 02/19/2019    Procedure: THIRD HAMMER TOE CORRECTION;  Surgeon: Niranjan Child DPM;  [facility];  Service: Podiatry    [place] RMVL MATEO CTR VAD W/SUBQ PORT/ CTR/PRPH INSJ N/A 3/14/2023    Procedure: REMOVAL VENOUS PORT (PORT-A-CATH)IR;  Surgeon: Avelino Vázquez DO;  [facility];  Service: Interventional Radiology    TOE OSTEOTOMY Right 03/14/2017    Procedure: HAMMERTOE CORRECTION R 2 ;  Surgeon: Niranjan Child DPM;  [facility];  Service:     TOE OSTEOTOMY Left 11/24/2020    Procedure: LEFT HT CORRECTION TOE;  Surgeon: Niranjan Child DPM;  [facility];  Service: Podiatry    TONSILLECTOMY  1963     Social History     Socioeconomic History    Marital status: /Civil Union     Spouse name: None    Number of children: None    Years of education: None    Highest education level: None   Occupational History    None    Tobacco Use    Smoking status: Never    Smokeless tobacco: Never    Tobacco comments:     Never smoked but exposed to second hand smoke from birth until 1980's   Vaping Use    Vaping status: Never Used   Substance and Sexual Activity    Alcohol use: No    Drug use: No    Sexual activity: Not Currently     Partners: Female     Birth control/protection: Abstinence   Other Topics Concern    None   Social History Narrative    None     Social Determinants of Health     Financial Resource Strain: Not on file   Food Insecurity: Not on file   Transportation Needs: Not on file   Physical Activity: Not on file   Stress: Not on file   Social Connections: Not on file   Intimate Partner Violence: Not on file   Housing Stability: Not on file        Current Outpatient Medications:     amLODIPine (NORVASC) 10 mg tablet, take 1 tablet by mouth once daily AT NOON, Disp: , Rfl:     aspirin 81 mg chewable tablet, Chew 81 mg daily, Disp: , Rfl:     Cholecalciferol 125 MCG (5000 UT) TABS, Take 5,000 Units by mouth in the morning, Disp: , Rfl:     Cholecalciferol 50 MCG (2000 UT) TABS, Take 1 tablet (2,000 Units total) by mouth daily (Patient not taking: Reported on 12/20/2023), Disp: , Rfl:     Empagliflozin (Jardiance) 25 MG TABS, Take 1 tablet (25 mg total) by mouth every morning, Disp: 90 tablet, Rfl: 2    hydrochlorothiazide (HYDRODIURIL) 25 mg tablet, Take 25 mg by mouth daily, Disp: , Rfl:     losartan (COZAAR) 100 MG tablet, Take 1 tablet (100 mg total) by mouth daily, Disp: , Rfl:     metFORMIN (GLUCOPHAGE) 500 mg tablet, Take 1,000 mg by mouth 2 (two) times a day with meals, Disp: , Rfl:     metoprolol succinate (TOPROL-XL) 100 mg 24 hr tablet, Take 100 mg by mouth every evening, Disp: , Rfl:     Multiple Vitamins-Minerals (Centrum Silver 50+Men) TABS, , Disp: , Rfl:     predniSONE 20 mg tablet, Take 40 mg by mouth daily (Patient not taking: Reported on 3/11/2024), Disp: , Rfl:     rosuvastatin (CRESTOR) 40 MG tablet, Take 40  mg by mouth every evening, Disp: , Rfl:     simvastatin (ZOCOR) 40 mg tablet, Take 40 mg by mouth daily at bedtime, Disp: , Rfl:   Family History   Problem Relation Age of Onset    Cancer Father         Leukemia      Review of Systems   Constitutional:  Negative for chills and fever.         Objective:  /73   Pulse 66   Temp (!) 96.8 °F (36 °C)   Resp 18     Physical Exam  Neurological:      Mental Status: He is alert.             Wound 09/01/22 Incision Chest Anterior;Right (Active)       Wound 03/14/23 Chest Right (Active)       Wound 09/07/23 Diabetic Ulcer Foot Right;Plantar;Posterior (Active)   Enter Singh score: Singh Grade 1: Partial or full-thickness ulcer (superficial) 03/28/24 1252   Wound Image   03/28/24 1252   Wound Description Pink;Yellow 03/28/24 1252   Delisa-wound Assessment Maceration 03/28/24 1252   Wound Length (cm) 1.2 cm 03/28/24 1252   Wound Width (cm) 1.3 cm 03/28/24 1252   Wound Depth (cm) 0.1 cm 03/28/24 1252   Wound Surface Area (cm^2) 1.56 cm^2 03/28/24 1252   Wound Volume (cm^3) 0.156 cm^3 03/28/24 1252   Calculated Wound Volume (cm^3) 0.16 cm^3 03/28/24 1252   Change in Wound Size % 84 03/28/24 1252   Tunneling 1 in depth located at 1-12 o'clock 10/05/23 1348   Number of underminings 1 02/15/24 1344   Undermining 1 0.4 02/15/24 1344   Undermining 1 is depth extending from 9-12 02/15/24 1344   Drainage Amount Moderate 03/28/24 1252   Drainage Description Serosanguineous;Yellow 03/28/24 1252   Non-staged Wound Description Full thickness 03/28/24 1252   Dressing Status Intact 03/28/24 1252                         Skin Substitute    Universal Protocol:  Consent: Verbal consent obtained.  Consent given by: patient  Patient understanding: patient states understanding of the procedure being performed  Patient identity confirmed: verbally with patient  Performed by: Physician  Product: Grafix PL.    Application Location and Measurements  Location: genitalia / hands / feet / multiple  digits  Skin Sub Lot #: [id number]  Skin Sub Expiration: 17/11/2024  Area (sq cm): 1.56  Product Applied (sq cm): 1.6  Product Wasted (sq cm): 0    Application Details  Fenestrated: No  Secured: Yes  Secured With: Steri-Strips  Dressing Applied: Yes  Dressing Comments: mepitel  Procedural pain (0-10): insensate  Post-procedural pain: insensate   Response to treatment: procedure was tolerated well   Comments: there is more overlap this week but it was till secured and not cut off with a scissor; hence no wastage                 Wound Instructions:  Orders Placed This Encounter   Procedures    Wound cleansing and dressings Diabetic Ulcer Right;Plantar;Posterior Foot     Wound cleansing and dressings Diabetic Ulcer Right;Plantar;Posterior Foot                               Right Foot Wound     Wash your hands with soap and water.  Remove old dressing, discard into plastic bag and place in trash.  Cleanse the wound with NSS prior to applying a clean dressing. Do not use tissue or cotton balls. Do not scrub the wound. Pat dry using gauze.     Shower no can not get wet     Apply skin prep to skin surrounding wound  Apply Grafix PL to the foot wound.  Cover with mepitel and secured with steri strips  Applied bulky dressing to help offload  Change dressing weekly at wound center     If dressing becomes wet with drainage may change outer dressing only, do not remove the steristrip area only change bulky dressing     Standing Status:   Future     Standing Expiration Date:   3/28/2025    Wound off loading Diabetic Ulcer Right;Plantar;Posterior Foot     · Wound off loading Diabetic Ulcer Right;Plantar;Posterior Foot      ·           Wound off loading Diabetic Ulcer Right;Plantar;Posterior Foot                          Need to keep all weight off of wound, need to use crutches if ambulating     Standing Status:   Future     Standing Expiration Date:   3/28/2025    Skin Substitute     This order was created via procedure  "documentation         Susie Luu DPM      Portions of the record may have been created with voice recognition software. Occasional wrong word or \"sound a like\" substitutions may have occurred due to the inherent limitations of voice recognition software. Read the chart carefully and recognize, using context, where substitutions have occurred.     "

## 2024-04-03 ENCOUNTER — CLINICAL SUPPORT (OUTPATIENT)
Dept: BARIATRICS | Facility: CLINIC | Age: 69
End: 2024-04-03

## 2024-04-03 VITALS — WEIGHT: 284.4 LBS | BODY MASS INDEX: 37.69 KG/M2 | HEIGHT: 73 IN

## 2024-04-03 DIAGNOSIS — R63.5 ABNORMAL WEIGHT GAIN: Primary | ICD-10-CM

## 2024-04-03 PROCEDURE — RECHECK

## 2024-04-04 ENCOUNTER — OFFICE VISIT (OUTPATIENT)
Dept: WOUND CARE | Facility: CLINIC | Age: 69
End: 2024-04-04
Payer: MEDICARE

## 2024-04-04 ENCOUNTER — TELEPHONE (OUTPATIENT)
Dept: HEMATOLOGY ONCOLOGY | Facility: CLINIC | Age: 69
End: 2024-04-04

## 2024-04-04 VITALS
DIASTOLIC BLOOD PRESSURE: 60 MMHG | HEART RATE: 75 BPM | TEMPERATURE: 97 F | RESPIRATION RATE: 18 BRPM | SYSTOLIC BLOOD PRESSURE: 117 MMHG

## 2024-04-04 DIAGNOSIS — E08.621 DIABETIC ULCER OF OTHER PART OF RIGHT FOOT ASSOCIATED WITH DIABETES MELLITUS DUE TO UNDERLYING CONDITION, WITH FAT LAYER EXPOSED (HCC): Primary | ICD-10-CM

## 2024-04-04 DIAGNOSIS — L97.512 DIABETIC ULCER OF OTHER PART OF RIGHT FOOT ASSOCIATED WITH DIABETES MELLITUS DUE TO UNDERLYING CONDITION, WITH FAT LAYER EXPOSED (HCC): Primary | ICD-10-CM

## 2024-04-04 PROCEDURE — 15275 SKIN SUB GRAFT FACE/NK/HF/G: CPT | Performed by: PODIATRIST

## 2024-04-04 NOTE — PROGRESS NOTES
Patient ID: Augustus Coffman is a 68 y.o. male Date of Birth 1955       Chief Complaint   Patient presents with    Follow Up Wound Care Visit     Diabetic ulcer right foot       Allergies:  Lisinopril    Diagnosis:  1. Diabetic ulcer of other part of right foot associated with diabetes mellitus due to underlying condition, with fat layer exposed (HCC)  -     Wound cleansing and dressings Diabetic Ulcer Right;Plantar;Posterior Foot; Future  -     Wound off loading Diabetic Ulcer Right;Plantar;Posterior Foot; Future  -     Skin Substitute       Diagnosis ICD-10-CM Associated Orders   1. Diabetic ulcer of other part of right foot associated with diabetes mellitus due to underlying condition, with fat layer exposed (HCC)  E08.621 Wound cleansing and dressings Diabetic Ulcer Right;Plantar;Posterior Foot    L97.512 Wound off loading Diabetic Ulcer Right;Plantar;Posterior Foot     Skin Substitute           Assessment & Plan:  See wound orders.    Diabetic ulcer right foot to fat layer/rosa 2 ulcer-Patient needs to continue compete NWB to the right forefoot.  The wound and garfield-wound have improved a lot in recent weeks.  Continue skin substitute applications if allowed by insurance.  Today an application was done.  No wastage as the overlapping margin was not cut or removed.      Subjective:   Patient is doing well.  He stayed off the foot very well.  His BS was slightly high today as he drank fresh squeezed OJ last night.           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HCC)    Hypertension    Hypercholesteremia    Hammer toe of right foot    Stasis dermatitis of both legs    Diabetic peripheral neuropathy (HCC)    Gout    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HCC)    Obesity with body mass index 30 or greater    Type 2 diabetes mellitus (HCC)    Retroperitoneal hematoma    Mesenteric  lymphadenopathy    Acute blood loss anemia    Heart murmur    GI bleed    Pleural effusion    Chronic venous hypertension (idiopathic) with ulcer of right lower extremity (HCC)    Rash    Stage 2 chronic kidney disease    Hypertensive kidney disease with stage 3 chronic kidney disease (HCC)    Other proteinuria    Obesity, morbid (HCC)    Follicular lymphoma grade I of lymph nodes of multiple sites (HCC)    Vitamin D deficiency    Stage 3a chronic kidney disease (HCC)    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 (HCC)     Past Medical History:   Diagnosis Date    Chronic kidney disease     Diabetes mellitus (HCC)     Follicular lymphoma (HCC)     High cholesterol     Hypertension      Past Surgical History:   Procedure Laterality Date    BUNIONECTOMY Right 11/24/2020    Procedure: RIGHT HAV CORRECTION,;  Surgeon: Niranjan Child DPM;  [facility];  Service: Podiatry    COLONOSCOPY      INCISION AND DRAINAGE OF WOUND Right 10/05/2016    Procedure: INCISION AND DRAINAGE (I&D) EXTREMITY;  Surgeon: Niranjan Child DPM;  Location[facility];  Service:     IR BIOPSY LYMPH NODE  07/08/2021    IR EMBOLIZATION (SPECIFY VESSEL OR SITE)  07/08/2021    IR PORT PLACEMENT  9/1/2022    PILONIDAL CYST EXCISION      NV CORRECTION HAMMERTOE Right 02/19/2019    Procedure: THIRD HAMMER TOE CORRECTION;  Surgeon: Niranjan Child DPM;  [facility];  Service: Podiatry    NV RMVL MATEO CTR VAD W/SUBQ PORT/ CTR/PRPH INSJ N/A 3/14/2023    Procedure: REMOVAL VENOUS PORT (PORT-A-CATH)IR;  Surgeon: Avelino Vázquez DO;  [facility];  Service: Interventional Radiology    TOE OSTEOTOMY Right 03/14/2017    Procedure: HAMMERTOE CORRECTION R 2 ;  Surgeon: Niranjan Child DPM;  [facility];  Service:     TOE OSTEOTOMY Left 11/24/2020    Procedure: LEFT HT CORRECTION TOE;  Surgeon: Niranjan Child DPM;  [facility];  Service: Podiatry    TONSILLECTOMY  1963     Social History     Socioeconomic History    Marital status:  /Civil Union     Spouse name: None    Number of children: None    Years of education: None    Highest education level: None   Occupational History    None   Tobacco Use    Smoking status: Never    Smokeless tobacco: Never    Tobacco comments:     Never smoked but exposed to second hand smoke from birth until 1980's   Vaping Use    Vaping status: Never Used   Substance and Sexual Activity    Alcohol use: No    Drug use: No    Sexual activity: Not Currently     Partners: Female     Birth control/protection: Abstinence   Other Topics Concern    None   Social History Narrative    None     Social Determinants of Health     Financial Resource Strain: Not on file   Food Insecurity: Not on file   Transportation Needs: Not on file   Physical Activity: Not on file   Stress: Not on file   Social Connections: Not on file   Intimate Partner Violence: Not on file   Housing Stability: Not on file        Current Outpatient Medications:     amLODIPine (NORVASC) 10 mg tablet, take 1 tablet by mouth once daily AT NOON, Disp: , Rfl:     aspirin 81 mg chewable tablet, Chew 81 mg daily, Disp: , Rfl:     Cholecalciferol 125 MCG (5000 UT) TABS, Take 5,000 Units by mouth in the morning, Disp: , Rfl:     Cholecalciferol 50 MCG (2000 UT) TABS, Take 1 tablet (2,000 Units total) by mouth daily (Patient not taking: Reported on 12/20/2023), Disp: , Rfl:     Empagliflozin (Jardiance) 25 MG TABS, Take 1 tablet (25 mg total) by mouth every morning, Disp: 90 tablet, Rfl: 2    hydrochlorothiazide (HYDRODIURIL) 25 mg tablet, Take 25 mg by mouth daily, Disp: , Rfl:     losartan (COZAAR) 100 MG tablet, Take 1 tablet (100 mg total) by mouth daily, Disp: , Rfl:     metFORMIN (GLUCOPHAGE) 500 mg tablet, Take 1,000 mg by mouth 2 (two) times a day with meals, Disp: , Rfl:     metoprolol succinate (TOPROL-XL) 100 mg 24 hr tablet, Take 100 mg by mouth every evening, Disp: , Rfl:     Multiple Vitamins-Minerals (Centrum Silver 50+Men) TABS, , Disp: , Rfl:      predniSONE 20 mg tablet, Take 40 mg by mouth daily (Patient not taking: Reported on 3/11/2024), Disp: , Rfl:     rosuvastatin (CRESTOR) 40 MG tablet, Take 40 mg by mouth every evening, Disp: , Rfl:     simvastatin (ZOCOR) 40 mg tablet, Take 40 mg by mouth daily at bedtime, Disp: , Rfl:   Family History   Problem Relation Age of Onset    Cancer Father         Leukemia      Review of Systems   Constitutional:  Negative for chills and fever.         Objective:  /60   Pulse 75   Temp (!) 97 °F (36.1 °C)   Resp 18     Physical Exam  Neurological:      Mental Status: He is alert.             Wound 09/01/22 Incision Chest Anterior;Right (Active)       Wound 03/14/23 Chest Right (Active)       Wound 09/07/23 Diabetic Ulcer Foot Right;Plantar;Posterior (Active)   Enter Singh score: Singh Grade 1: Partial or full-thickness ulcer (superficial) 03/28/24 1252   Wound Image   04/04/24 1337   Wound Description Pink;Epithelialization 04/04/24 1336   Delisa-wound Assessment Maceration 04/04/24 1336   Wound Length (cm) 1.1 cm 04/04/24 1336   Wound Width (cm) 1.2 cm 04/04/24 1336   Wound Depth (cm) 0.1 cm 04/04/24 1336   Wound Surface Area (cm^2) 1.32 cm^2 04/04/24 1336   Wound Volume (cm^3) 0.132 cm^3 04/04/24 1336   Calculated Wound Volume (cm^3) 0.13 cm^3 04/04/24 1336   Change in Wound Size % 87 04/04/24 1336   Tunneling 1 in depth located at 1-12 o'clock 10/05/23 1348   Number of underminings 1 02/15/24 1344   Undermining 1 0.4 02/15/24 1344   Undermining 1 is depth extending from 9-12 02/15/24 1344   Drainage Amount Moderate 04/04/24 1336   Drainage Description Serosanguineous 04/04/24 1336   Non-staged Wound Description Full thickness 04/04/24 1336   Dressing Status Intact 04/04/24 1336                         Skin Substitute    Universal Protocol:  Consent: Verbal consent obtained.  Consent given by: patient  Patient understanding: patient states understanding of the procedure being performed  Patient identity  confirmed: verbally with patient  Performed by: Physician  Product: Grafix PL.    Application Location and Measurements  Location: genitalia / hands / feet / multiple digits  Skin Sub Lot #: [id number]  Skin Sub Expiration: 02/DEC/2024  Area (sq cm): 1.32  Product Applied (sq cm): 0.16  Product Wasted (sq cm): 0    Application Details  Fenestrated: No  Secured: Yes  Secured With: Steri-Strips  Dressing Applied: Yes  Dressing Comments: mepitel  Procedural pain (0-10): insensate  Response to treatment: procedure was tolerated well                 Wound Instructions:  Orders Placed This Encounter   Procedures    Wound cleansing and dressings Diabetic Ulcer Right;Plantar;Posterior Foot     Wound cleansing and dressings Diabetic Ulcer Right;Plantar;Posterior Foot                                        Right Foot Wound     Wash your hands with soap and water.  Remove old dressing, discard into plastic bag and place in trash.  Cleanse the wound with NSS prior to applying a clean dressing. Do not use tissue or cotton balls. Do not scrub the wound. Pat dry using gauze.     Shower no can not get wet     Apply skin prep to skin surrounding wound  Apply Grafix PL to the foot wound.  Cover with mepitel and secured with steri strips  Applied bulky dressing to help offload  Change dressing weekly at wound center     If dressing becomes wet with drainage may change outer dressing only, do not remove the steristrip area only change bulky dressing     Standing Status:   Future     Standing Expiration Date:   4/4/2025    Wound off loading Diabetic Ulcer Right;Plantar;Posterior Foot     · Wound off loading Diabetic Ulcer Right;Plantar;Posterior Foot                                Need to keep all weight off of wound, need to use crutches if ambulating     Standing Status:   Future     Standing Expiration Date:   4/4/2025    Skin Substitute     This order was created via procedure documentation         Susie Luu,  "DPM      Portions of the record may have been created with voice recognition software. Occasional wrong word or \"sound a like\" substitutions may have occurred due to the inherent limitations of voice recognition software. Read the chart carefully and recognize, using context, where substitutions have occurred.     "

## 2024-04-04 NOTE — TELEPHONE ENCOUNTER
Appointment Change  Cancel, Reschedule, Change to Virtual      Who are you speaking with? Patient   If it is not the patient, is the caller listed on the communication consent form? N/A   Which provider is the appointment scheduled with? Dr. Cummings   When was the original appointment scheduled?    Please list date and time 11/25/24 10:20 AM   At which location is the appointment scheduled to take place? [place]   Was the appointment rescheduled?     Was the appointment changed from an in person visit to a virtual visit?    If so, please list the details of the change. 11/20/24 1:20 PM   What is the reason for the appointment change? Provider requested change due to scheduling conflict.       Was STAR transport scheduled? No   Does STAR transport need to be scheduled for the new visit (if applicable) No   Does the patient need an infusion appointment rescheduled? No   Does the patient have an upcoming infusion appointment scheduled? If so, when? No   Is the patient undergoing chemotherapy? No   For appointments cancelled with less than 24 hours:  Was the no-show policy reviewed? Yes

## 2024-04-04 NOTE — PATIENT INSTRUCTIONS
Orders Placed This Encounter   Procedures    Wound cleansing and dressings Diabetic Ulcer Right;Plantar;Posterior Foot     Wound cleansing and dressings Diabetic Ulcer Right;Plantar;Posterior Foot                                        Right Foot Wound     Wash your hands with soap and water.  Remove old dressing, discard into plastic bag and place in trash.  Cleanse the wound with NSS prior to applying a clean dressing. Do not use tissue or cotton balls. Do not scrub the wound. Pat dry using gauze.     Shower no can not get wet     Apply skin prep to skin surrounding wound  Apply Grafix PL to the foot wound.  Cover with mepitel and secured with steri strips  Applied bulky dressing to help offload  Change dressing weekly at wound center     If dressing becomes wet with drainage may change outer dressing only, do not remove the steristrip area only change bulky dressing     Standing Status:   Future     Standing Expiration Date:   4/4/2025    Wound off loading Diabetic Ulcer Right;Plantar;Posterior Foot     · Wound off loading Diabetic Ulcer Right;Plantar;Posterior Foot                                Need to keep all weight off of wound, need to use crutches if ambulating     Standing Status:   Future     Standing Expiration Date:   4/4/2025

## 2024-04-08 ENCOUNTER — CLINICAL SUPPORT (OUTPATIENT)
Dept: BARIATRICS | Facility: CLINIC | Age: 69
End: 2024-04-08

## 2024-04-08 VITALS — HEIGHT: 73 IN | BODY MASS INDEX: 37.8 KG/M2 | WEIGHT: 285.2 LBS

## 2024-04-08 DIAGNOSIS — R63.5 ABNORMAL WEIGHT GAIN: Primary | ICD-10-CM

## 2024-04-08 PROCEDURE — RECHECK

## 2024-04-08 NOTE — PROGRESS NOTES
"Weight Management Medical Nutrition Assessment  Wilmer was here today for his 3 month follow-up and REE in the [facility].  Today he weighs 285.2 lbs lbs giving him a loss of 7.8  lbs since his last visit.   He is no longer skipping meals, and has been eating smaller meals/snacks more frequently.  He has increased his water intake and has cut out juice.   He is continues to address a wound on his foot, and is not able to exercise per his wound specialist but is reports that it is improving.   Reevue indirect calorimter revealed REE is          compared to the predictive normal for someone her same age, height, and gender.   Reevue Indirect Calorimeter REE:            Weight loss without exercise:           Weight loss with exercise:                      Maintenance:               Patient seen by Medical Provider in past 6 months:  yes  Requested to schedule appointment with Medical Provider: No        Anthropometric Measurements  Start Weight (#): 300  Current Weight (#): 293  TBW % Change from start weight:2%  Ideal Body Weight (#):184  Goal Weight (#): to lose 75 lbs  Highest: 340   Lowest: 220 (\"years ago\")     Weight Loss History  Previous weight loss attempts: Commercial Programs (Weight Watchers, [employer], etc.)  FAD Diets (Cabbage soup, Grapefruit, Cleanse, etc.)  Self Created Diets (Portion Control, Healthy Food Choices, etc.)     Food and Nutrition Related History     Food Recall  Breakfast:egg whites                Snack:bar  Lunch:nuts, yogurt or shake  Snack:  Dinner: vegetables, 6- 8 oz porotein  Snack:         Beverages: water  Volume of beverage intake: 32 oz     Weekends: Same  Cravings: juice  Trouble area of day:mid day     Frequency of Eating out: irregularly  Food restrictions:none  Cooking: self   Food Shopping: self     Physical Activity Intake  Activity:none  Frequency: none  Physical limitations/barriers to exercise: foot wound     Estimated Needs     [employer] Needs: " BMR : 2170   1-2# loss weekly sedentary:  1604 - 2104             1-2# loss weekly lightly active:1983- 2483  Maintenance calories for sedentary activity level: 2604  Protein:100 - 125      (1.2-1.5g/kg IBW)  Fluid: 98     (35mL/kg IBW)     Nutrition Diagnosis  Yes;    Overweight/obesity  related to Excess energy intake as evidenced by  BMI more than normative standard for age and sex (obesity-grade III 40+)     Nutrition Intervention     Nutrition Prescription  Calories:1600 - 1800  Protein:100 - 125  Fluid:98        Nutrition Education:    Healthy [facility] Manual  Calorie controlled menu  Lean protein food choices  Healthy snack options  Food journaling tips        Nutrition Counseling:  Strategies: meal planning, portion sizes, healthy snack choices, hydration, fiber intake, protein intake, exercise, food journal        Monitoring and Evaluation:  Evaluation criteria:  Energy Intake  Meet protein needs  Maintain adequate hydration  Monitor weekly weight  Meal planning/preparation  Food journal   Decreased portions at mealtimes and snacks  Physical activity      Barriers to learning:none  Readiness to change: Action:  (Changing behavior)  Comprehension: good  Expected Compliance: good

## 2024-04-10 ENCOUNTER — CLINICAL SUPPORT (OUTPATIENT)
Dept: BARIATRICS | Facility: CLINIC | Age: 69
End: 2024-04-10

## 2024-04-10 ENCOUNTER — TELEPHONE (OUTPATIENT)
Dept: NEPHROLOGY | Facility: CLINIC | Age: 69
End: 2024-04-10

## 2024-04-10 VITALS — BODY MASS INDEX: 37.85 KG/M2 | HEIGHT: 73 IN | WEIGHT: 285.6 LBS

## 2024-04-10 DIAGNOSIS — R63.5 ABNORMAL WEIGHT GAIN: Primary | ICD-10-CM

## 2024-04-10 PROCEDURE — RECHECK

## 2024-04-10 NOTE — TELEPHONE ENCOUNTER
Called spoke with patient advised patient to get non-fasting labs done 1 week prior to 04/22/24 follow up appointment with Dr.Jwalant Kumar MD. patient verbalized understanding and is okay with it.  lab orders in [facility].

## 2024-04-11 ENCOUNTER — OFFICE VISIT (OUTPATIENT)
Dept: WOUND CARE | Facility: CLINIC | Age: 69
End: 2024-04-11
Payer: MEDICARE

## 2024-04-11 VITALS
TEMPERATURE: 96.9 F | DIASTOLIC BLOOD PRESSURE: 60 MMHG | HEART RATE: 70 BPM | SYSTOLIC BLOOD PRESSURE: 130 MMHG | RESPIRATION RATE: 18 BRPM

## 2024-04-11 DIAGNOSIS — N18.30 TYPE 2 DIABETES MELLITUS WITH STAGE 3 CHRONIC KIDNEY DISEASE, UNSPECIFIED WHETHER LONG TERM INSULIN USE, UNSPECIFIED WHETHER STAGE 3A OR 3B CKD (HCC): ICD-10-CM

## 2024-04-11 DIAGNOSIS — E11.22 TYPE 2 DIABETES MELLITUS WITH STAGE 3 CHRONIC KIDNEY DISEASE, UNSPECIFIED WHETHER LONG TERM INSULIN USE, UNSPECIFIED WHETHER STAGE 3A OR 3B CKD (HCC): ICD-10-CM

## 2024-04-11 DIAGNOSIS — L97.512 DIABETIC ULCER OF OTHER PART OF RIGHT FOOT ASSOCIATED WITH DIABETES MELLITUS DUE TO UNDERLYING CONDITION, WITH FAT LAYER EXPOSED (HCC): Primary | ICD-10-CM

## 2024-04-11 DIAGNOSIS — N18.2 HYPERTENSIVE KIDNEY DISEASE WITH STAGE 2 CHRONIC KIDNEY DISEASE: ICD-10-CM

## 2024-04-11 DIAGNOSIS — E08.621 DIABETIC ULCER OF OTHER PART OF RIGHT FOOT ASSOCIATED WITH DIABETES MELLITUS DUE TO UNDERLYING CONDITION, WITH FAT LAYER EXPOSED (HCC): Primary | ICD-10-CM

## 2024-04-11 DIAGNOSIS — I12.9 HYPERTENSIVE KIDNEY DISEASE WITH STAGE 2 CHRONIC KIDNEY DISEASE: ICD-10-CM

## 2024-04-11 DIAGNOSIS — E13.9 DIABETES 1.5, MANAGED AS TYPE 2 (HCC): Primary | ICD-10-CM

## 2024-04-11 PROCEDURE — 15275 SKIN SUB GRAFT FACE/NK/HF/G: CPT | Performed by: PODIATRIST

## 2024-04-11 RX ORDER — ALLOPURINOL 300 MG/1
300 TABLET ORAL DAILY
COMMUNITY
End: 2024-04-11 | Stop reason: SDUPTHER

## 2024-04-11 RX ORDER — ROSUVASTATIN CALCIUM 40 MG/1
40 TABLET, COATED ORAL EVERY EVENING
Qty: 90 TABLET | Refills: 1 | Status: CANCELLED | OUTPATIENT
Start: 2024-04-11

## 2024-04-11 NOTE — PROGRESS NOTES
Patient ID: Augustus Coffman is a 68 y.o. male Date of Birth 1955       Chief Complaint   Patient presents with    Follow Up Wound Care Visit     Diabetic ulcer of right foot       Allergies:  Lisinopril    Diagnosis:  1. Diabetic ulcer of other part of right foot associated with diabetes mellitus due to underlying condition, with fat layer exposed (HCC)  -     Wound Procedure Treatment Diabetic Ulcer Right;Plantar;Posterior Foot  -     Wound cleansing and dressings Diabetic Ulcer Right;Plantar;Posterior Foot; Future       Diagnosis ICD-10-CM Associated Orders   1. Diabetic ulcer of other part of right foot associated with diabetes mellitus due to underlying condition, with fat layer exposed (HCC)  E08.621 Wound Procedure Treatment Diabetic Ulcer Right;Plantar;Posterior Foot    L97.512 Wound cleansing and dressings Diabetic Ulcer Right;Plantar;Posterior Foot           Assessment & Plan:  See wound orders.    Diabetic ulcer right foot to fat/rosa 2 ulcer.-Patient is doing do well with weight management, NWB and the Graffix PL Prime.  Continue the same treatment protocol and plan for additional Graffix PL Prime application next week.    Subjective:   The patient states he really has been complaint with the NWB right foot.  Also, his BS was 119 today.           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HCC)    Hypertension    Hypercholesteremia    Hammer toe of right foot    Stasis dermatitis of both legs    Diabetic peripheral neuropathy (HCC)    Gout    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HCC)    Obesity with body mass index 30 or greater    Type 2 diabetes mellitus (HCC)    Retroperitoneal hematoma    Mesenteric lymphadenopathy    Acute blood loss anemia    Heart murmur    GI bleed    Pleural effusion    Chronic venous hypertension (idiopathic) with ulcer of right lower extremity (HCC)     Rash    Stage 2 chronic kidney disease    Hypertensive kidney disease with stage 3 chronic kidney disease (HCC)    Other proteinuria    Obesity, morbid (HCC)    Follicular lymphoma grade I of lymph nodes of multiple sites (HCC)    Vitamin D deficiency    Stage 3a chronic kidney disease (HCC)    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 (HCC)     Past Medical History:   Diagnosis Date    Chronic kidney disease     Diabetes mellitus (HCC)     Follicular lymphoma (HCC)     High cholesterol     Hypertension      Past Surgical History:   Procedure Laterality Date    BUNIONECTOMY Right 11/24/2020    Procedure: RIGHT HAV CORRECTION,;  Surgeon: Niranjan Child DPM;  [facility];  Service: Podiatry    COLONOSCOPY      INCISION AND DRAINAGE OF WOUND Right 10/05/2016    Procedure: INCISION AND DRAINAGE (I&D) EXTREMITY;  Surgeon: Niranjan Child DPM;  [facility];  Service:     IR BIOPSY LYMPH NODE  07/08/2021    IR EMBOLIZATION (SPECIFY VESSEL OR SITE)  07/08/2021    IR PORT PLACEMENT  9/1/2022    PILONIDAL CYST EXCISION      MS CORRECTION HAMMERTOE Right 02/19/2019    Procedure: THIRD HAMMER TOE CORRECTION;  Surgeon: Niranjan Child DPM;  [facility];  Service: Podiatry    MS RMVL MATEO CTR VAD W/SUBQ PORT/ CTR/PRPH INSJ N/A 3/14/2023    Procedure: REMOVAL VENOUS PORT (PORT-A-CATH)IR;  Surgeon: Avelino Vázquez DO;  [facility];  Service: Interventional Radiology    TOE OSTEOTOMY Right 03/14/2017    Procedure: HAMMERTOE CORRECTION R 2 ;  Surgeon: Niranjan Child DPM;  [facility];  Service:     TOE OSTEOTOMY Left 11/24/2020    Procedure: LEFT HT CORRECTION TOE;  Surgeon: Niranjan Child DPM;  [facility];  Service: Podiatry    TONSILLECTOMY  1963     Social History     Socioeconomic History    Marital status: /Civil Union     Spouse name: None    Number of children: None    Years of education: None    Highest education level: None   Occupational History    None   Tobacco Use     Smoking status: Never    Smokeless tobacco: Never    Tobacco comments:     Never smoked but exposed to second hand smoke from birth until 1980's   Vaping Use    Vaping status: Never Used   Substance and Sexual Activity    Alcohol use: No    Drug use: No    Sexual activity: Not Currently     Partners: Female     Birth control/protection: Abstinence   Other Topics Concern    None   Social History Narrative    None     Social Determinants of Health     Financial Resource Strain: Not on file   Food Insecurity: Not on file   Transportation Needs: Not on file   Physical Activity: Not on file   Stress: Not on file   Social Connections: Not on file   Intimate Partner Violence: Not on file   Housing Stability: Not on file        Current Outpatient Medications:     allopurinol (ZYLOPRIM) 300 mg tablet, Take 300 mg by mouth daily, Disp: , Rfl:     amLODIPine (NORVASC) 10 mg tablet, take 1 tablet by mouth once daily AT NOON, Disp: , Rfl:     aspirin 81 mg chewable tablet, Chew 81 mg daily, Disp: , Rfl:     Cholecalciferol 125 MCG (5000 UT) TABS, Take 5,000 Units by mouth in the morning, Disp: , Rfl:     Cholecalciferol 50 MCG (2000 UT) TABS, Take 1 tablet (2,000 Units total) by mouth daily (Patient not taking: Reported on 12/20/2023), Disp: , Rfl:     Empagliflozin (Jardiance) 25 MG TABS, Take 1 tablet (25 mg total) by mouth every morning, Disp: 90 tablet, Rfl: 2    hydrochlorothiazide (HYDRODIURIL) 25 mg tablet, Take 25 mg by mouth daily, Disp: , Rfl:     losartan (COZAAR) 100 MG tablet, Take 1 tablet (100 mg total) by mouth daily, Disp: , Rfl:     metFORMIN (GLUCOPHAGE) 500 mg tablet, Take 1,000 mg by mouth 2 (two) times a day with meals, Disp: , Rfl:     metoprolol succinate (TOPROL-XL) 100 mg 24 hr tablet, Take 100 mg by mouth every evening, Disp: , Rfl:     Multiple Vitamins-Minerals (Centrum Silver 50+Men) TABS, , Disp: , Rfl:     predniSONE 20 mg tablet, Take 40 mg by mouth daily (Patient not taking: Reported on  3/11/2024), Disp: , Rfl:     rosuvastatin (CRESTOR) 40 MG tablet, Take 40 mg by mouth every evening, Disp: , Rfl:     simvastatin (ZOCOR) 40 mg tablet, Take 40 mg by mouth daily at bedtime, Disp: , Rfl:   Family History   Problem Relation Age of Onset    Cancer Father         Leukemia      Review of Systems   Constitutional:  Negative for chills and fever.         Objective:  /60   Pulse 70   Temp (!) 96.9 °F (36.1 °C)   Resp 18     Physical Exam  Neurological:      Mental Status: He is alert.             Wound 09/01/22 Incision Chest Anterior;Right (Active)       Wound 03/14/23 Chest Right (Active)       Wound 09/07/23 Diabetic Ulcer Foot Right;Plantar;Posterior (Active)   Enter Singh score: Singh Grade 1: Partial or full-thickness ulcer (superficial) 04/11/24 1358   Wound Image   04/11/24 1358   Wound Description Pink;Epithelialization 04/11/24 1358   Delisa-wound Assessment Maceration 04/11/24 1358   Wound Length (cm) 0.9 cm 04/11/24 1358   Wound Width (cm) 0.9 cm 04/11/24 1358   Wound Depth (cm) 0.1 cm 04/11/24 1358   Wound Surface Area (cm^2) 0.81 cm^2 04/11/24 1358   Wound Volume (cm^3) 0.081 cm^3 04/11/24 1358   Calculated Wound Volume (cm^3) 0.08 cm^3 04/11/24 1358   Change in Wound Size % 92 04/11/24 1358   Tunneling 1 in depth located at 1-12 o'clock 10/05/23 1348   Number of underminings 1 02/15/24 1344   Undermining 1 0.4 02/15/24 1344   Undermining 1 is depth extending from 9-12 02/15/24 1344   Drainage Amount Moderate 04/11/24 1358   Drainage Description Serosanguineous 04/04/24 1336   Non-staged Wound Description Full thickness 04/11/24 1358   Dressing Status Intact 04/11/24 1358                         Skin Substitute    Universal Protocol:  Consent: Verbal consent obtained.  Consent given by: patient  Patient understanding: patient states understanding of the procedure being performed  Patient identity confirmed: verbally with patient  Performed by: Physician  Product: Grafix  "PL.    Application Location and Measurements  Location: genitalia / hands / feet / multiple digits  Skin Sub Lot #: [id number]  Skin Sub Expiration: [id number]  Area (sq cm): 0.81  Product Applied (sq cm): 0.16  Product Wasted (sq cm): 0    Application Details  Fenestrated: No  Secured: Yes  Secured With: Steri-Strips  Dressing Applied: Yes  Dressing Comments: mepitel  Procedural pain (0-10): insensate  Post-procedural pain: insensate   Response to treatment: procedure was tolerated well                 Wound Instructions:  Orders Placed This Encounter   Procedures    Wound Procedure Treatment Diabetic Ulcer Right;Plantar;Posterior Foot     This order was created via procedure documentation    Wound cleansing and dressings Diabetic Ulcer Right;Plantar;Posterior Foot     Wound cleansing and dressings Diabetic Ulcer Right;Plantar;Posterior Foot        If dressing becomes wet with drainage may change outer dressing only, do not remove the steristrip area only change bulky dressing (kerlix, cast padding, abd and buffy and tape to secure)  Grafix applied weekly at the wound center          · Wound off loading Diabetic Ulcer Right;Plantar;Posterior Foot                                       Need to keep all weight off of wound, need to use crutches if ambulating     Standing Status:   Future     Standing Expiration Date:   4/18/2024         Susie Luu DPM      Portions of the record may have been created with voice recognition software. Occasional wrong word or \"sound a like\" substitutions may have occurred due to the inherent limitations of voice recognition software. Read the chart carefully and recognize, using context, where substitutions have occurred.     "

## 2024-04-11 NOTE — PROGRESS NOTES
Wound Procedure Treatment Diabetic Ulcer Right;Plantar;Posterior Foot    Performed by: Rohini Roche RN  Authorized by: Susie Luu DPM  Associated wounds:   Wound 09/07/23 Diabetic Ulcer Foot Right;Plantar;Posterior  Wound cleansed with:  Wound aggrssively cleansed with NSS and gauze  Applied to periwound:  Skin prep  Applied primary dressing:  Other  Applied secondary dressing:  Other, Gauze, Cast padding and ABD  Wound secured with:  Coban, Julisa and Tape  Mepiltel applied over graft and secured with steri strips

## 2024-04-11 NOTE — PATIENT INSTRUCTIONS
Orders Placed This Encounter   Procedures    Wound Procedure Treatment Diabetic Ulcer Right;Plantar;Posterior Foot     This order was created via procedure documentation    Wound cleansing and dressings Diabetic Ulcer Right;Plantar;Posterior Foot     Wound cleansing and dressings Diabetic Ulcer Right;Plantar;Posterior Foot        If dressing becomes wet with drainage may change outer dressing only, do not remove the steristrip area only change bulky dressing (kerlix, cast padding, abd and buffy and tape to secure)  Grafix applied weekly at the wound center          · Wound off loading Diabetic Ulcer Right;Plantar;Posterior Foot                                       Need to keep all weight off of wound, need to use crutches if ambulating     Standing Status:   Future     Standing Expiration Date:   4/18/2024

## 2024-04-12 RX ORDER — METOPROLOL SUCCINATE 100 MG/1
100 TABLET, EXTENDED RELEASE ORAL EVERY EVENING
Qty: 90 TABLET | Refills: 1 | Status: SHIPPED | OUTPATIENT
Start: 2024-04-12

## 2024-04-12 RX ORDER — AMLODIPINE BESYLATE 10 MG/1
10 TABLET ORAL DAILY
Qty: 90 TABLET | Refills: 1 | Status: SHIPPED | OUTPATIENT
Start: 2024-04-12

## 2024-04-12 RX ORDER — LOSARTAN POTASSIUM 100 MG/1
100 TABLET ORAL DAILY
Qty: 90 TABLET | Refills: 1 | Status: SHIPPED | OUTPATIENT
Start: 2024-04-12

## 2024-04-12 RX ORDER — ALLOPURINOL 300 MG/1
300 TABLET ORAL DAILY
Qty: 90 TABLET | Refills: 1 | Status: SHIPPED | OUTPATIENT
Start: 2024-04-12

## 2024-04-12 RX ORDER — HYDROCHLOROTHIAZIDE 25 MG/1
25 TABLET ORAL DAILY
Qty: 90 TABLET | Refills: 1 | Status: SHIPPED | OUTPATIENT
Start: 2024-04-12

## 2024-04-12 NOTE — TELEPHONE ENCOUNTER
He is requesting a refill of rosuvastatin but he has simvastatin on his list as well. Please confirm which statin he is taking.     DO Kuldeep Garzaroe [facility]  4/12/2024 7:27 AM

## 2024-04-15 RX ORDER — ROSUVASTATIN CALCIUM 40 MG/1
40 TABLET, COATED ORAL EVERY EVENING
Qty: 90 TABLET | Refills: 1 | Status: SHIPPED | OUTPATIENT
Start: 2024-04-15

## 2024-04-18 ENCOUNTER — OFFICE VISIT (OUTPATIENT)
Dept: WOUND CARE | Facility: CLINIC | Age: 69
End: 2024-04-18
Payer: MEDICARE

## 2024-04-18 ENCOUNTER — APPOINTMENT (OUTPATIENT)
Dept: LAB | Facility: HOSPITAL | Age: 69
End: 2024-04-18
Payer: MEDICARE

## 2024-04-18 VITALS
SYSTOLIC BLOOD PRESSURE: 134 MMHG | RESPIRATION RATE: 18 BRPM | TEMPERATURE: 96.7 F | DIASTOLIC BLOOD PRESSURE: 70 MMHG | HEART RATE: 67 BPM

## 2024-04-18 DIAGNOSIS — L97.919 CHRONIC VENOUS HYPERTENSION (IDIOPATHIC) WITH ULCER OF RIGHT LOWER EXTREMITY (HCC): ICD-10-CM

## 2024-04-18 DIAGNOSIS — L97.512 DIABETIC ULCER OF OTHER PART OF RIGHT FOOT ASSOCIATED WITH DIABETES MELLITUS DUE TO UNDERLYING CONDITION, WITH FAT LAYER EXPOSED (HCC): Primary | ICD-10-CM

## 2024-04-18 DIAGNOSIS — E11.59 TYPE 2 DIABETES MELLITUS WITH OTHER CIRCULATORY COMPLICATION, WITHOUT LONG-TERM CURRENT USE OF INSULIN (HCC): ICD-10-CM

## 2024-04-18 DIAGNOSIS — N18.30 HYPERTENSIVE KIDNEY DISEASE WITH STAGE 3 CHRONIC KIDNEY DISEASE, UNSPECIFIED WHETHER STAGE 3A OR 3B CKD (HCC): ICD-10-CM

## 2024-04-18 DIAGNOSIS — N18.31 STAGE 3A CHRONIC KIDNEY DISEASE (HCC): ICD-10-CM

## 2024-04-18 DIAGNOSIS — I12.9 HYPERTENSIVE KIDNEY DISEASE WITH STAGE 3 CHRONIC KIDNEY DISEASE, UNSPECIFIED WHETHER STAGE 3A OR 3B CKD (HCC): ICD-10-CM

## 2024-04-18 DIAGNOSIS — E83.52 HYPERCALCEMIA: ICD-10-CM

## 2024-04-18 DIAGNOSIS — R80.8 OTHER PROTEINURIA: ICD-10-CM

## 2024-04-18 DIAGNOSIS — E08.621 DIABETIC ULCER OF OTHER PART OF RIGHT FOOT ASSOCIATED WITH DIABETES MELLITUS DUE TO UNDERLYING CONDITION, WITH FAT LAYER EXPOSED (HCC): Primary | ICD-10-CM

## 2024-04-18 DIAGNOSIS — I87.311 CHRONIC VENOUS HYPERTENSION (IDIOPATHIC) WITH ULCER OF RIGHT LOWER EXTREMITY (HCC): ICD-10-CM

## 2024-04-18 DIAGNOSIS — E55.9 VITAMIN D DEFICIENCY: ICD-10-CM

## 2024-04-18 LAB
25(OH)D3 SERPL-MCNC: 47.4 NG/ML (ref 30–100)
ALBUMIN SERPL BCP-MCNC: 4.8 G/DL (ref 3.5–5)
ALP SERPL-CCNC: 55 U/L (ref 34–104)
ALT SERPL W P-5'-P-CCNC: 22 U/L (ref 7–52)
ANION GAP SERPL CALCULATED.3IONS-SCNC: 9 MMOL/L (ref 4–13)
AST SERPL W P-5'-P-CCNC: 22 U/L (ref 13–39)
BACTERIA UR QL AUTO: ABNORMAL /HPF
BILIRUB SERPL-MCNC: 0.44 MG/DL (ref 0.2–1)
BILIRUB UR QL STRIP: NEGATIVE
BUN SERPL-MCNC: 27 MG/DL (ref 5–25)
CALCIUM SERPL-MCNC: 10.3 MG/DL (ref 8.4–10.2)
CHLORIDE SERPL-SCNC: 100 MMOL/L (ref 96–108)
CHOLEST SERPL-MCNC: 157 MG/DL
CLARITY UR: CLEAR
CO2 SERPL-SCNC: 31 MMOL/L (ref 21–32)
COLOR UR: ABNORMAL
CREAT SERPL-MCNC: 1.15 MG/DL (ref 0.6–1.3)
GFR SERPL CREATININE-BSD FRML MDRD: 65 ML/MIN/1.73SQ M
GLUCOSE P FAST SERPL-MCNC: 115 MG/DL (ref 65–99)
GLUCOSE UR STRIP-MCNC: ABNORMAL MG/DL
HDLC SERPL-MCNC: 35 MG/DL
HGB UR QL STRIP.AUTO: NEGATIVE
KETONES UR STRIP-MCNC: NEGATIVE MG/DL
LDLC SERPL CALC-MCNC: 73 MG/DL (ref 0–100)
LEUKOCYTE ESTERASE UR QL STRIP: ABNORMAL
NITRITE UR QL STRIP: NEGATIVE
NON-SQ EPI CELLS URNS QL MICRO: ABNORMAL /HPF
NONHDLC SERPL-MCNC: 122 MG/DL
PH UR STRIP.AUTO: 6 [PH]
PHOSPHATE SERPL-MCNC: 3.7 MG/DL (ref 2.3–4.1)
POTASSIUM SERPL-SCNC: 4.2 MMOL/L (ref 3.5–5.3)
PROT SERPL-MCNC: 7.9 G/DL (ref 6.4–8.4)
PROT UR STRIP-MCNC: ABNORMAL MG/DL
PTH-INTACT SERPL-MCNC: 72.7 PG/ML (ref 12–88)
RBC #/AREA URNS AUTO: ABNORMAL /HPF
SODIUM SERPL-SCNC: 140 MMOL/L (ref 135–147)
SP GR UR STRIP.AUTO: 1.02 (ref 1–1.03)
TRIGL SERPL-MCNC: 245 MG/DL
URATE SERPL-MCNC: 6 MG/DL (ref 3.5–8.5)
UROBILINOGEN UR STRIP-ACNC: <2 MG/DL
WBC #/AREA URNS AUTO: ABNORMAL /HPF

## 2024-04-18 PROCEDURE — 84550 ASSAY OF BLOOD/URIC ACID: CPT

## 2024-04-18 PROCEDURE — 82306 VITAMIN D 25 HYDROXY: CPT

## 2024-04-18 PROCEDURE — 80061 LIPID PANEL: CPT

## 2024-04-18 PROCEDURE — 15275 SKIN SUB GRAFT FACE/NK/HF/G: CPT | Performed by: PODIATRIST

## 2024-04-18 PROCEDURE — 84100 ASSAY OF PHOSPHORUS: CPT

## 2024-04-18 PROCEDURE — 83970 ASSAY OF PARATHORMONE: CPT

## 2024-04-18 PROCEDURE — 80053 COMPREHEN METABOLIC PANEL: CPT

## 2024-04-18 PROCEDURE — 82043 UR ALBUMIN QUANTITATIVE: CPT

## 2024-04-18 PROCEDURE — 82570 ASSAY OF URINE CREATININE: CPT

## 2024-04-18 PROCEDURE — 81001 URINALYSIS AUTO W/SCOPE: CPT

## 2024-04-18 PROCEDURE — 36415 COLL VENOUS BLD VENIPUNCTURE: CPT

## 2024-04-18 NOTE — PROGRESS NOTES
Patient ID: Augustus Coffman is a 68 y.o. male Date of Birth 1955       Chief Complaint   Patient presents with    Follow Up Wound Care Visit     Right foot diabetic ulcer       Allergies:  Lisinopril    Diagnosis:  1. Diabetic ulcer of other part of right foot associated with diabetes mellitus due to underlying condition, with fat layer exposed (HCC)  -     Wound cleansing and dressings Diabetic Ulcer Right;Plantar;Posterior Foot; Future  -     Wound Procedure Treatment Diabetic Ulcer Right;Plantar;Posterior Foot       Diagnosis ICD-10-CM Associated Orders   1. Diabetic ulcer of other part of right foot associated with diabetes mellitus due to underlying condition, with fat layer exposed (HCC)  E08.621 Wound cleansing and dressings Diabetic Ulcer Right;Plantar;Posterior Foot    L97.512 Wound Procedure Treatment Diabetic Ulcer Right;Plantar;Posterior Foot           Assessment & Plan:  See wound orders.    Diabetic ulcer right foot to fat/rosa 2 ulcer:  Patient will stay off the foot even when seated.  Watch BS and diet.  Patient is actually fasting today for blood work.  Continue with weight management.  Skin sub applied today and dressing should be left clean and dry.  Plan for additional Graffix PL Prime application next week.    Subjective:   Patient did stay off the foot but did sit a lot to do his taxes and his foot was resting on the ground.        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HCC)    Hypertension    Hypercholesteremia    Hammer toe of right foot    Stasis dermatitis of both legs    Diabetic peripheral neuropathy (HCC)    Gout    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HCC)    Obesity with body mass index 30 or greater    Type 2 diabetes mellitus (HCC)    Retroperitoneal hematoma    Mesenteric lymphadenopathy    Acute blood loss anemia    Heart murmur    GI bleed     Pleural effusion    Chronic venous hypertension (idiopathic) with ulcer of right lower extremity (HCC)    Rash    Stage 2 chronic kidney disease    Hypertensive kidney disease with stage 3 chronic kidney disease (HCC)    Other proteinuria    Obesity, morbid (HCC)    Follicular lymphoma grade I of lymph nodes of multiple sites (HCC)    Vitamin D deficiency    Stage 3a chronic kidney disease (HCC)    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 (HCC)     Past Medical History:   Diagnosis Date    Chronic kidney disease     Diabetes mellitus (HCC)     Follicular lymphoma (HCC)     High cholesterol     Hypertension      Past Surgical History:   Procedure Laterality Date    BUNIONECTOMY Right 11/24/2020    Procedure: RIGHT HAV CORRECTION,;  Surgeon: Niranjan Child DPM;  [facility];  Service: Podiatry    COLONOSCOPY      INCISION AND DRAINAGE OF WOUND Right 10/05/2016    Procedure: INCISION AND DRAINAGE (I&D) EXTREMITY;  Surgeon: Niranjan Child DPM;  [facility];  Service:     IR BIOPSY LYMPH NODE  07/08/2021    IR EMBOLIZATION (SPECIFY VESSEL OR SITE)  07/08/2021    IR PORT PLACEMENT  9/1/2022    PILONIDAL CYST EXCISION      WI CORRECTION HAMMERTOE Right 02/19/2019    Procedure: THIRD HAMMER TOE CORRECTION;  Surgeon: Niranjan Child DPM;  [facility];  Service: Podiatry    WI RMVL MATEO CTR VAD W/SUBQ PORT/ CTR/PRPH INSJ N/A 3/14/2023    Procedure: REMOVAL VENOUS PORT (PORT-A-CATH)IR;  Surgeon: Avelino Vázquez DO;  [facility];  Service: Interventional Radiology    TOE OSTEOTOMY Right 03/14/2017    Procedure: HAMMERTOE CORRECTION R 2 ;  Surgeon: Niranjan Child DPM;  [facility];  Service:     TOE OSTEOTOMY Left 11/24/2020    Procedure: LEFT HT CORRECTION TOE;  Surgeon: Niranjan Child DPM;  [facility];  Service: Podiatry    TONSILLECTOMY  1963     Social History     Socioeconomic History    Marital status: /Civil Union     Spouse name: None    Number of children: None    Years of  education: None    Highest education level: None   Occupational History    None   Tobacco Use    Smoking status: Never    Smokeless tobacco: Never    Tobacco comments:     Never smoked but exposed to second hand smoke from birth until 1980's   Vaping Use    Vaping status: Never Used   Substance and Sexual Activity    Alcohol use: No    Drug use: No    Sexual activity: Not Currently     Partners: Female     Birth control/protection: Abstinence   Other Topics Concern    None   Social History Narrative    None     Social Determinants of Health     Financial Resource Strain: Not on file   Food Insecurity: Not on file   Transportation Needs: Not on file   Physical Activity: Not on file   Stress: Not on file   Social Connections: Not on file   Intimate Partner Violence: Not on file   Housing Stability: Not on file        Current Outpatient Medications:     allopurinol (ZYLOPRIM) 300 mg tablet, Take 1 tablet (300 mg total) by mouth daily, Disp: 90 tablet, Rfl: 1    amLODIPine (NORVASC) 10 mg tablet, Take 1 tablet (10 mg total) by mouth daily, Disp: 90 tablet, Rfl: 1    aspirin 81 mg chewable tablet, Chew 81 mg daily, Disp: , Rfl:     Cholecalciferol 125 MCG (5000 UT) TABS, Take 5,000 Units by mouth in the morning, Disp: , Rfl:     Cholecalciferol 50 MCG (2000 UT) TABS, Take 1 tablet (2,000 Units total) by mouth daily (Patient not taking: Reported on 12/20/2023), Disp: , Rfl:     Empagliflozin (Jardiance) 25 MG TABS, Take 1 tablet (25 mg total) by mouth every morning, Disp: 90 tablet, Rfl: 2    hydroCHLOROthiazide 25 mg tablet, Take 1 tablet (25 mg total) by mouth daily, Disp: 90 tablet, Rfl: 1    losartan (COZAAR) 100 MG tablet, Take 1 tablet (100 mg total) by mouth daily, Disp: 90 tablet, Rfl: 1    metFORMIN (GLUCOPHAGE) 500 mg tablet, Take 2 tablets (1,000 mg total) by mouth 2 (two) times a day with meals, Disp: 180 tablet, Rfl: 1    metoprolol succinate (TOPROL-XL) 100 mg 24 hr tablet, Take 1 tablet (100 mg total) by  mouth every evening, Disp: 90 tablet, Rfl: 1    Multiple Vitamins-Minerals (Centrum Silver 50+Men) TABS, , Disp: , Rfl:     predniSONE 20 mg tablet, Take 40 mg by mouth daily (Patient not taking: Reported on 3/11/2024), Disp: , Rfl:     rosuvastatin (CRESTOR) 40 MG tablet, Take 1 tablet (40 mg total) by mouth every evening, Disp: 90 tablet, Rfl: 1  Family History   Problem Relation Age of Onset    Cancer Father         Leukemia      Review of Systems   Constitutional:  Negative for chills and fever.         Objective:  /70   Pulse 67   Temp (!) 96.7 °F (35.9 °C)   Resp 18     Physical Exam  Neurological:      Mental Status: He is alert.             Wound 09/01/22 Incision Chest Anterior;Right (Active)       Wound 03/14/23 Chest Right (Active)       Wound 09/07/23 Diabetic Ulcer Foot Right;Plantar;Posterior (Active)   Enter Singh score: Singh Grade 1: Partial or full-thickness ulcer (superficial) 04/18/24 1349   Wound Image   04/18/24 1349   Wound Description Pink;Epithelialization 04/18/24 1349   Delisa-wound Assessment Maceration 04/18/24 1349   Wound Length (cm) 0.8 cm 04/18/24 1349   Wound Width (cm) 0.9 cm 04/18/24 1349   Wound Depth (cm) 0.1 cm 04/18/24 1349   Wound Surface Area (cm^2) 0.72 cm^2 04/18/24 1349   Wound Volume (cm^3) 0.072 cm^3 04/18/24 1349   Calculated Wound Volume (cm^3) 0.07 cm^3 04/18/24 1349   Change in Wound Size % 93 04/18/24 1349   Tunneling 1 in depth located at 1-12 o'clock 10/05/23 1348   Number of underminings 1 02/15/24 1344   Undermining 1 0.4 02/15/24 1344   Undermining 1 is depth extending from 9-12 02/15/24 1344   Drainage Amount Moderate 04/18/24 1349   Drainage Description Serosanguineous 04/18/24 1349   Non-staged Wound Description Full thickness 04/18/24 1349   Dressing Status Intact 04/18/24 1349                         Skin Substitute    Universal Protocol:  Consent: Verbal consent obtained.  Consent given by: patient  Patient understanding: patient states  "understanding of the procedure being performed  Patient identity confirmed: verbally with patient  Performed by: Physician  Product: Grafix PL.    Application Location and Measurements  Location: genitalia / hands / feet / multiple digits  Skin Sub Lot #: [id number]  Skin Sub Expiration: 3Feb2025  Area (sq cm): 0.72  Product Applied (sq cm): 1  Product Wasted (sq cm): 0.6    Application Details  Fenestrated: No  Secured: Yes  Secured With: Steri-Strips  Dressing Applied: Yes  Dressing Comments: mepitel  Procedural pain (0-10): insensate  Post-procedural pain: insensate   Response to treatment: procedure was tolerated well                 Wound Instructions:  Orders Placed This Encounter   Procedures    Wound cleansing and dressings Diabetic Ulcer Right;Plantar;Posterior Foot     Wound cleansing and dressings Diabetic Ulcer Right;Plantar;Posterior Foot               If dressing becomes wet with drainage may change outer dressing only, do not remove the steristrip area only change bulky dressing (kerlix, cast padding, abd and buffy and tape to secure)  Grafix applied weekly at the wound center                                  ·           Wound off loading Diabetic Ulcer Right;Plantar;Posterior Foot                                        Need to keep all weight off of wound, need to use crutches if ambulating     Standing Status:   Future     Standing Expiration Date:   4/25/2024    Wound Procedure Treatment Diabetic Ulcer Right;Plantar;Posterior Foot     This order was created via procedure documentation    Skin Substitute     This order was created via procedure documentation         Susie Luu DPM      Portions of the record may have been created with voice recognition software. Occasional wrong word or \"sound a like\" substitutions may have occurred due to the inherent limitations of voice recognition software. Read the chart carefully and recognize, using context, where substitutions have occurred.   "

## 2024-04-18 NOTE — PATIENT INSTRUCTIONS
Orders Placed This Encounter   Procedures    Wound cleansing and dressings Diabetic Ulcer Right;Plantar;Posterior Foot     Wound cleansing and dressings Diabetic Ulcer Right;Plantar;Posterior Foot               If dressing becomes wet with drainage may change outer dressing only, do not remove the steristrip area only change bulky dressing (kerlix, cast padding, abd and buffy and tape to secure)  Grafix applied weekly at the wound center                                  ·           Wound off loading Diabetic Ulcer Right;Plantar;Posterior Foot                                        Need to keep all weight off of wound, need to use crutches if ambulating     Standing Status:   Future     Standing Expiration Date:   4/25/2024    Wound Procedure Treatment Diabetic Ulcer Right;Plantar;Posterior Foot     This order was created via procedure documentation

## 2024-04-18 NOTE — PROGRESS NOTES
Wound Procedure Treatment Diabetic Ulcer Right;Plantar;Posterior Foot    Performed by: Rohini Roche RN  Authorized by: Susie Luu DPM  Associated wounds:   Wound 09/07/23 Diabetic Ulcer Foot Right;Plantar;Posterior  Wound cleansed with:  NSS  Applied to periwound:  Skin prep  Applied secondary dressing:  ABD, Cast padding and Gauze  Wound secured with:  Kerlix, Julisa, Tubifast and Tape    Provider applied graft mepitel and steri strips  Bulky dressing applied

## 2024-04-19 LAB
CREAT UR-MCNC: 67.9 MG/DL
MICROALBUMIN UR-MCNC: 508.7 MG/L
MICROALBUMIN/CREAT 24H UR: 749 MG/G CREATININE (ref 0–30)

## 2024-04-22 ENCOUNTER — OFFICE VISIT (OUTPATIENT)
Dept: NEPHROLOGY | Facility: CLINIC | Age: 69
End: 2024-04-22
Payer: MEDICARE

## 2024-04-22 VITALS
RESPIRATION RATE: 16 BRPM | TEMPERATURE: 97 F | HEIGHT: 73 IN | BODY MASS INDEX: 37.77 KG/M2 | SYSTOLIC BLOOD PRESSURE: 110 MMHG | HEART RATE: 75 BPM | OXYGEN SATURATION: 96 % | DIASTOLIC BLOOD PRESSURE: 70 MMHG | WEIGHT: 285 LBS

## 2024-04-22 DIAGNOSIS — N18.2 STAGE 2 CHRONIC KIDNEY DISEASE: Primary | ICD-10-CM

## 2024-04-22 DIAGNOSIS — E55.9 VITAMIN D DEFICIENCY: ICD-10-CM

## 2024-04-22 DIAGNOSIS — N18.2 HYPERTENSIVE KIDNEY DISEASE WITH STAGE 2 CHRONIC KIDNEY DISEASE: ICD-10-CM

## 2024-04-22 DIAGNOSIS — R80.8 OTHER PROTEINURIA: ICD-10-CM

## 2024-04-22 DIAGNOSIS — I12.9 HYPERTENSIVE KIDNEY DISEASE WITH STAGE 2 CHRONIC KIDNEY DISEASE: ICD-10-CM

## 2024-04-22 PROCEDURE — 99214 OFFICE O/P EST MOD 30 MIN: CPT | Performed by: INTERNAL MEDICINE

## 2024-04-22 NOTE — PROGRESS NOTES
NEPHROLOGY OFFICE FOLLOW UP  Augustus Coffman 68 y.o.male  MRN: [id number]    Encounter: [id number] DATE: 4/22/2024    REASON FOR VISIT: Augustus Coffman is a 68 y.o. male who is here on 4/22/2024 for further management of chronic kidney disease.    HPI:    This is 68-year-old male with past medical history significant for hypertension, diabetes type 2, hyperlipidemia, returns to the Nephrology office for further management of CKD stage 2-3.    Upon review of old medical records, previously known baseline creatinine was thought to be around 1.2.    Patient was earlier found to have mesenteric and retroperitoneal lymphadenopathy.  Patient had underwent lymph node biopsy in the past and was diagnosed with follicular lymphoma.  Patient is currently being followed by oncologist and in the interim has received chemotherapy.  Patient has received the last round of chemotherapy on 2/2/2023 and had underwent PET scan on 2/20/2023 and showed no activity in the lymph node area and currently chemotherapy is on hold.    Previously patient had developed FINA and losartan was discontinued during previous hospital stay but later on patient was found to have significant proteinuria and I have restarted losartan.    Patient has underlying hypertension and according to patient blood pressures under well control with current antihypertensive medications    Patient has diabetes type 2 and currently taking metformin and according to patient diabetes is currently under well control.  Recently patient had developed right foot diabetic ulcer been followed by wound care and according to patient his right foot ulcer is slowly improving.    Patient also have underlying vitamin D deficiency and currently taking cholecalciferol.    Patient takes all the medications as prescribed and denies use of NSAIDs .        REVIEW OF SYSTEMS:    Review of Systems   Constitutional:  Negative for chills and fever.   HENT:  Negative for nosebleeds.    Eyes:   Negative for photophobia and pain.   Respiratory:  Negative for choking.    Cardiovascular:  Negative for palpitations.   Gastrointestinal:  Negative for blood in stool.   Genitourinary:  Negative for hematuria.   Musculoskeletal:  Negative for neck stiffness.   Neurological:  Negative for seizures.   Psychiatric/Behavioral:  Negative for confusion and suicidal ideas.          PAST MEDICAL HISTORY:  Past Medical History:   Diagnosis Date    Chronic kidney disease     Diabetes mellitus (HCC)     Follicular lymphoma (HCC)     High cholesterol     Hypertension        PAST SURGICAL HISTORY:  Past Surgical History:   Procedure Laterality Date    BUNIONECTOMY Right 11/24/2020    Procedure: RIGHT HAV CORRECTION,;  Surgeon: Niranjan Child DPM;  [facility];  Service: Podiatry    COLONOSCOPY      INCISION AND DRAINAGE OF WOUND Right 10/05/2016    Procedure: INCISION AND DRAINAGE (I&D) EXTREMITY;  Surgeon: Niranjan Child DPM;  [facility];  Service:     IR BIOPSY LYMPH NODE  07/08/2021    IR EMBOLIZATION (SPECIFY VESSEL OR SITE)  07/08/2021    IR PORT PLACEMENT  9/1/2022    PILONIDAL CYST EXCISION      AR CORRECTION HAMMERTOE Right 02/19/2019    Procedure: THIRD HAMMER TOE CORRECTION;  Surgeon: Niranjan Child DPM;  [facility];  Service: Podiatry    AR RMVL MATEO CTR VAD W/SUBQ PORT/ CTR/PRPH INSJ N/A 3/14/2023    Procedure: REMOVAL VENOUS PORT (PORT-A-CATH)IR;  Surgeon: Avelino Vázquez DO;  [facility];  Service: Interventional Radiology    TOE OSTEOTOMY Right 03/14/2017    Procedure: HAMMERTOE CORRECTION R 2 ;  Surgeon: Niranjan Child DPM;  [facility];  Service:     TOE OSTEOTOMY Left 11/24/2020    Procedure: LEFT HT CORRECTION TOE;  Surgeon: Niranjan Child DPM;  [facility];  Service: Podiatry    TONSILLECTOMY  1963       SOCIAL HISTORY:  Social History     Substance and Sexual Activity   Alcohol Use No     Social History     Substance and Sexual Activity   Drug Use No  "    Social History     Tobacco Use   Smoking Status Never   Smokeless Tobacco Never   Tobacco Comments    Never smoked but exposed to second hand smoke from birth until 1980's       FAMILY HISTORY:  Family History   Problem Relation Age of Onset    Cancer Father         Leukemia       ALLERGY:  Allergies   Allergen Reactions    Lisinopril Cough       MEDICATIONS:    Current Outpatient Medications:     allopurinol (ZYLOPRIM) 300 mg tablet, Take 1 tablet (300 mg total) by mouth daily, Disp: 90 tablet, Rfl: 1    amLODIPine (NORVASC) 10 mg tablet, Take 1 tablet (10 mg total) by mouth daily, Disp: 90 tablet, Rfl: 1    aspirin 81 mg chewable tablet, Chew 81 mg daily, Disp: , Rfl:     Cholecalciferol 100 MCG (4000 UT) CAPS, Take 1 capsule (4,000 Units total) by mouth in the morning, Disp: , Rfl:     Empagliflozin (Jardiance) 25 MG TABS, Take 1 tablet (25 mg total) by mouth every morning, Disp: 90 tablet, Rfl: 2    hydroCHLOROthiazide 25 mg tablet, Take 1 tablet (25 mg total) by mouth daily, Disp: 90 tablet, Rfl: 1    losartan (COZAAR) 100 MG tablet, Take 1 tablet (100 mg total) by mouth daily, Disp: 90 tablet, Rfl: 1    metFORMIN (GLUCOPHAGE) 500 mg tablet, Take 2 tablets (1,000 mg total) by mouth 2 (two) times a day with meals, Disp: 180 tablet, Rfl: 1    metoprolol succinate (TOPROL-XL) 100 mg 24 hr tablet, Take 1 tablet (100 mg total) by mouth every evening, Disp: 90 tablet, Rfl: 1    Multiple Vitamins-Minerals (Centrum Silver 50+Men) TABS, , Disp: , Rfl:     rosuvastatin (CRESTOR) 40 MG tablet, Take 1 tablet (40 mg total) by mouth every evening, Disp: 90 tablet, Rfl: 1    PHYSICAL EXAM:  Vitals:    04/22/24 1459   BP: 110/70   Pulse: 75   Resp: 16   Temp: (!) 97 °F (36.1 °C)   TempSrc: Temporal   SpO2: 96%   Weight: 129 kg (285 lb)   Height: 6' 1\" (1.854 m)     Body mass index is 37.6 kg/m².    Physical Exam  Constitutional:       General: He is not in acute distress.  HENT:      Right Ear: External ear normal.   Eyes: "      Conjunctiva/sclera:      Right eye: No hemorrhage.  Neck:      Thyroid: No thyromegaly.   Cardiovascular:      Rate and Rhythm: Normal rate.      Heart sounds: Murmur heard.   Pulmonary:      Effort: No accessory muscle usage or respiratory distress.      Breath sounds: No wheezing.   Abdominal:      General: There is no distension.   Musculoskeletal:      Right ankle: Swelling present.      Left ankle: No swelling.   Skin:     General: Skin is warm.      Coloration: Skin is not jaundiced.   Neurological:      Mental Status: He is alert. He is not disoriented.   Psychiatric:         Speech: He is communicative.         Behavior: Behavior is not combative.         LAB RESULTS:  Results for orders placed or performed in visit on 04/18/24   Urinalysis with microscopic   Result Value Ref Range    Color, UA Light Yellow     Clarity, UA Clear     Specific Gravity, UA 1.022 1.003 - 1.030    pH, UA 6.0 4.5, 5.0, 5.5, 6.0, 6.5, 7.0, 7.5, 8.0    Leukocytes, UA (A) Negative     Elevated glucose may cause decreased leukocyte values. See urine microscopic for UWBC result    Nitrite, UA Negative Negative    Protein, UA 50 (1+) (A) Negative mg/dl    Glucose, UA >=1000 (1%) (A) Negative mg/dl    Ketones, UA Negative Negative mg/dl    Urobilinogen, UA <2.0 <2.0 mg/dl mg/dl    Bilirubin, UA Negative Negative    Occult Blood, UA Negative Negative    RBC, UA None Seen None Seen, 1-2 /hpf    WBC, UA None Seen None Seen, 1-2 /hpf    Epithelial Cells None Seen None Seen, Occasional /hpf    Bacteria, UA None Seen None Seen, Occasional /hpf   Albumin / creatinine urine ratio   Result Value Ref Range    Creatinine, Ur 67.9 Reference range not established. mg/dL    Albumin,U,Random 508.7 (H) <20.0 mg/L    Albumin Creat Ratio 749 (H) 0 - 30 mg/g creatinine   Phosphorus   Result Value Ref Range    Phosphorus 3.7 2.3 - 4.1 mg/dL   Uric acid   Result Value Ref Range    Uric Acid 6.0 3.5 - 8.5 mg/dL   PTH, intact   Result Value Ref Range     PTH 72.7 12.0 - 88.0 pg/mL   Vitamin D 25 hydroxy   Result Value Ref Range    Vit D, 25-Hydroxy 47.4 30.0 - 100.0 ng/mL   Lipid panel   Result Value Ref Range    Cholesterol 157 See Comment mg/dL    Triglycerides 245 (H) See Comment mg/dL    HDL, Direct 35 (L) >=40 mg/dL    LDL Calculated 73 0 - 100 mg/dL    Non-HDL-Chol (CHOL-HDL) 122 mg/dl   Comprehensive metabolic panel   Result Value Ref Range    Sodium 140 135 - 147 mmol/L    Potassium 4.2 3.5 - 5.3 mmol/L    Chloride 100 96 - 108 mmol/L    CO2 31 21 - 32 mmol/L    ANION GAP 9 4 - 13 mmol/L    BUN 27 (H) 5 - 25 mg/dL    Creatinine 1.15 0.60 - 1.30 mg/dL    Glucose, Fasting 115 (H) 65 - 99 mg/dL    Calcium 10.3 (H) 8.4 - 10.2 mg/dL    AST 22 13 - 39 U/L    ALT 22 7 - 52 U/L    Alkaline Phosphatase 55 34 - 104 U/L    Total Protein 7.9 6.4 - 8.4 g/dL    Albumin 4.8 3.5 - 5.0 g/dL    Total Bilirubin 0.44 0.20 - 1.00 mg/dL    eGFR 65 ml/min/1.73sq m       ASSESSMENT and PLAN:  Wilmer was seen today for chronic kidney disease and follow-up.    Diagnoses and all orders for this visit:    Stage 2 chronic kidney disease  -     CBC and differential; Future  -     Basic metabolic panel; Future  -     Urinalysis with microscopic; Future  -     Albumin / creatinine urine ratio; Future    Hypertensive kidney disease with stage 2 chronic kidney disease  -     CBC and differential; Future  -     Basic metabolic panel; Future  -     Urinalysis with microscopic; Future  -     Albumin / creatinine urine ratio; Future    Vitamin D deficiency  -     Vitamin D 25 hydroxy; Future  -     Cholecalciferol 100 MCG (4000 UT) CAPS; Take 1 capsule (4,000 Units total) by mouth in the morning    Other proteinuria  -     Basic metabolic panel; Future  -     Urinalysis with microscopic; Future  -     Albumin / creatinine urine ratio; Future      1. CKD stage 2-3.  Multifactorial, suspected due to underlying diabetic nephropathy on top of hypertensive nephrosclerosis with proteinuria.    Upon  review of old medical records from [facility] chart review, new baseline creatinine seems to be fluctuating around 1.2 and in the interim renal function has remained close to baseline with current creatinine of 1.15 with EGFR of 65.    Clinically patient is not in volume overload state and advised patient to drink around 2 L of fluid on daily basis to ensure staying well-hydrated.    Patient has underlying significant proteinuria and I have started patient on Jardiance which would be also beneficial for management of underlying chronic kidney disease.    Patient is now being followed by [facility]'s weight management program and according to patient he has been slowly losing weight.    Management of diabetes and hypertension as mentioned below.    Plan to avoid NSAIDs and recheck renal function before next visit.    2.  Hypertension in chronic kidney disease .  Today's blood pressure is under well control and plan to monitor hypertension with amlodipine 10 mg p.o. daily, losartan 100 mg p.o. daily, hydrochlorothiazide 25 mg p.o. daily and metoprolol  mg p.o. daily today.  Encourage patient to follow low-salt diet.    3. Diabetes type 2 in chronic kidney disease.  Goal Hb A1c would be < 7% for underlying chronic kidney disease.  Patient is now taking metformin along with Jardiance.  Defer further management of diabetes to PCP.    4. Proteinuria.  Multifactorial and suspected due to underlying diabetic nephropathy on top of hypertensive nephrosclerosis.  SPEP and UPEP done on 1/31/2023 showed no M spike.    Current urine microalbumin to creatinine ratio is 1.3 g.  Patient is already on losartan 100 mg p.o. daily and Jardiance 25 mg p.o. daily and for time being monitor proteinuria on current medication.    5.  Vitamin D deficiency.  Patient is currently taking cholecalciferol 5000 units p.o. daily and current vitamin D level is 47 which is at goal but found to have elevated calcium level of 10.3 and plan to decrease  "cholecalciferol to 4000 units p.o. daily and recheck calcium and vitamin D level with the next visit.    Returns to Nephrology office in 6 months .  Plan to check CBC, BMP, vitamin D, urine analysis along with urine microalbumin to creatinine ratio before next visit.        Portions of the record may have been created with voice recognition software. Occasional wrong word or \"sound a like\" substitutions may have occurred due to the inherent limitations of voice recognition software. Read the chart carefully and recognize, using context, where substitutions have occurred.If you have any questions, please contact the dictating provider.   "

## 2024-04-24 ENCOUNTER — CLINICAL SUPPORT (OUTPATIENT)
Dept: BARIATRICS | Facility: CLINIC | Age: 69
End: 2024-04-24

## 2024-04-24 VITALS — HEIGHT: 73 IN | WEIGHT: 281.6 LBS | BODY MASS INDEX: 37.32 KG/M2

## 2024-04-24 DIAGNOSIS — R63.5 ABNORMAL WEIGHT GAIN: Primary | ICD-10-CM

## 2024-04-24 PROCEDURE — RECHECK

## 2024-04-25 ENCOUNTER — OFFICE VISIT (OUTPATIENT)
Dept: WOUND CARE | Facility: CLINIC | Age: 69
End: 2024-04-25
Payer: MEDICARE

## 2024-04-25 VITALS
DIASTOLIC BLOOD PRESSURE: 63 MMHG | HEART RATE: 71 BPM | TEMPERATURE: 98 F | RESPIRATION RATE: 18 BRPM | SYSTOLIC BLOOD PRESSURE: 117 MMHG

## 2024-04-25 DIAGNOSIS — E08.621 DIABETIC ULCER OF OTHER PART OF RIGHT FOOT ASSOCIATED WITH DIABETES MELLITUS DUE TO UNDERLYING CONDITION, WITH FAT LAYER EXPOSED (HCC): Primary | ICD-10-CM

## 2024-04-25 DIAGNOSIS — L97.512 DIABETIC ULCER OF OTHER PART OF RIGHT FOOT ASSOCIATED WITH DIABETES MELLITUS DUE TO UNDERLYING CONDITION, WITH FAT LAYER EXPOSED (HCC): Primary | ICD-10-CM

## 2024-04-25 PROCEDURE — 15275 SKIN SUB GRAFT FACE/NK/HF/G: CPT | Performed by: PODIATRIST

## 2024-04-25 NOTE — PROGRESS NOTES
Wound Procedure Treatment Diabetic Ulcer Right;Plantar;Posterior Foot    Performed by: Maryana Bocanegra RN  Authorized by: Susie Luu DPM  Associated wounds:   Wound 09/07/23 Diabetic Ulcer Foot Right;Plantar;Posterior  Applied secondary dressing:  ABD, Cast padding and Gauze  Wound secured with:  Kerlix, Coban, Tape and Tubifast    Provider applied Grafix PL 16mm Disc, Steri Strips, Mepitel.

## 2024-04-25 NOTE — PROGRESS NOTES
Patient ID: Augustus Coffman is a 68 y.o. male Date of Birth 1955       Chief Complaint   Patient presents with    Follow Up Wound Care Visit     Right Foot Wound       Allergies:  Lisinopril    Diagnosis:  1. Diabetic ulcer of other part of right foot associated with diabetes mellitus due to underlying condition, with fat layer exposed (HCC)  -     Wound cleansing and dressings Diabetic Ulcer Right;Plantar;Posterior Foot; Future       Diagnosis ICD-10-CM Associated Orders   1. Diabetic ulcer of other part of right foot associated with diabetes mellitus due to underlying condition, with fat layer exposed (HCC)  E08.621 Wound cleansing and dressings Diabetic Ulcer Right;Plantar;Posterior Foot    L97.512            Assessment & Plan:  See wound orders.    Patient will continue to keep BS low and stay off the foot.  The wound is steadily getting smaller and I will plan for another graffix application at the wound center next week.    Subjective:   Patient is seen for follow up.  He has been off the foot more since his taxes are done and he did not sit with pressure on the foot.  The patient notes his BS was 114 today.        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HCC)    Hypertension    Hypercholesteremia    Hammer toe of right foot    Stasis dermatitis of both legs    Diabetic peripheral neuropathy (HCC)    Gout    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HCC)    Obesity with body mass index 30 or greater    Type 2 diabetes mellitus (HCC)    Retroperitoneal hematoma    Mesenteric lymphadenopathy    Acute blood loss anemia    Heart murmur    GI bleed    Pleural effusion    Chronic venous hypertension (idiopathic) with ulcer of right lower extremity (HCC)    Rash    Stage 2 chronic kidney disease    Hypertensive kidney disease with stage 2 chronic kidney disease    Other proteinuria     Obesity, morbid (HCC)    Follicular lymphoma grade I of lymph nodes of multiple sites (HCC)    Vitamin D deficiency    Stage 3a chronic kidney disease (HCC)    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 (HCC)     Past Medical History:   Diagnosis Date    Chronic kidney disease     Diabetes mellitus (HCC)     Follicular lymphoma (HCC)     High cholesterol     Hypertension      Past Surgical History:   Procedure Laterality Date    BUNIONECTOMY Right 11/24/2020    Procedure: RIGHT HAV CORRECTION,;  Surgeon: Niranjan Child DPM;  [facility];  Service: Podiatry    COLONOSCOPY      INCISION AND DRAINAGE OF WOUND Right 10/05/2016    Procedure: INCISION AND DRAINAGE (I&D) EXTREMITY;  Surgeon: Niranjan Child DPM;  [facility];  Service:     IR BIOPSY LYMPH NODE  07/08/2021    IR EMBOLIZATION (SPECIFY VESSEL OR SITE)  07/08/2021    IR PORT PLACEMENT  9/1/2022    PILONIDAL CYST EXCISION      TX CORRECTION HAMMERTOE Right 02/19/2019    Procedure: THIRD HAMMER TOE CORRECTION;  Surgeon: Niranjan Child DPM;  [facility];  Service: Podiatry    TX RMVL MATEO CTR VAD W/SUBQ PORT/ CTR/PRPH INSJ N/A 3/14/2023    Procedure: REMOVAL VENOUS PORT (PORT-A-CATH)IR;  Surgeon: Avelino Vázquez DO;  [facility];  Service: Interventional Radiology    TOE OSTEOTOMY Right 03/14/2017    Procedure: HAMMERTOE CORRECTION R 2 ;  Surgeon: Niranjan Child DPM;  [facility];  Service:     TOE OSTEOTOMY Left 11/24/2020    Procedure: LEFT HT CORRECTION TOE;  Surgeon: Niranjan Child DPM;  [facility];  Service: Podiatry    TONSILLECTOMY  1963     Social History     Socioeconomic History    Marital status: /Civil Union     Spouse name: None    Number of children: None    Years of education: None    Highest education level: None   Occupational History    None   Tobacco Use    Smoking status: Never    Smokeless tobacco: Never    Tobacco comments:     Never smoked but exposed to second hand smoke from birth until 1980's    Vaping Use    Vaping status: Never Used   Substance and Sexual Activity    Alcohol use: No    Drug use: No    Sexual activity: Not Currently     Partners: Female     Birth control/protection: Abstinence   Other Topics Concern    None   Social History Narrative    None     Social Determinants of Health     Financial Resource Strain: Not on file   Food Insecurity: Not on file   Transportation Needs: Not on file   Physical Activity: Not on file   Stress: Not on file   Social Connections: Not on file   Intimate Partner Violence: Not on file   Housing Stability: Not on file        Current Outpatient Medications:     allopurinol (ZYLOPRIM) 300 mg tablet, Take 1 tablet (300 mg total) by mouth daily, Disp: 90 tablet, Rfl: 1    amLODIPine (NORVASC) 10 mg tablet, Take 1 tablet (10 mg total) by mouth daily, Disp: 90 tablet, Rfl: 1    aspirin 81 mg chewable tablet, Chew 81 mg daily, Disp: , Rfl:     Cholecalciferol 100 MCG (4000 UT) CAPS, Take 1 capsule (4,000 Units total) by mouth in the morning, Disp: , Rfl:     Empagliflozin (Jardiance) 25 MG TABS, Take 1 tablet (25 mg total) by mouth every morning, Disp: 90 tablet, Rfl: 2    hydroCHLOROthiazide 25 mg tablet, Take 1 tablet (25 mg total) by mouth daily, Disp: 90 tablet, Rfl: 1    losartan (COZAAR) 100 MG tablet, Take 1 tablet (100 mg total) by mouth daily, Disp: 90 tablet, Rfl: 1    metFORMIN (GLUCOPHAGE) 500 mg tablet, Take 2 tablets (1,000 mg total) by mouth 2 (two) times a day with meals, Disp: 180 tablet, Rfl: 1    metoprolol succinate (TOPROL-XL) 100 mg 24 hr tablet, Take 1 tablet (100 mg total) by mouth every evening, Disp: 90 tablet, Rfl: 1    Multiple Vitamins-Minerals (Centrum Silver 50+Men) TABS, , Disp: , Rfl:     rosuvastatin (CRESTOR) 40 MG tablet, Take 1 tablet (40 mg total) by mouth every evening, Disp: 90 tablet, Rfl: 1  Family History   Problem Relation Age of Onset    Cancer Father         Leukemia      Review of Systems   Constitutional:  Negative for  chills and fever.         Objective:  /63   Pulse 71   Temp 98 °F (36.7 °C)   Resp 18     Physical Exam  Neurological:      Mental Status: He is alert.             Wound 09/01/22 Incision Chest Anterior;Right (Active)       Wound 03/14/23 Chest Right (Active)       Wound 09/07/23 Diabetic Ulcer Foot Right;Plantar;Posterior (Active)   Enter Singh score: Singh Grade 1: Partial or full-thickness ulcer (superficial) 04/25/24 1354   Wound Image   04/25/24 1354   Wound Description Pink;Epithelialization 04/25/24 1354   Delisa-wound Assessment Maceration 04/25/24 1354   Wound Length (cm) 0.7 cm 04/25/24 1354   Wound Width (cm) 0.7 cm 04/25/24 1354   Wound Depth (cm) 0.1 cm 04/25/24 1354   Wound Surface Area (cm^2) 0.49 cm^2 04/25/24 1354   Wound Volume (cm^3) 0.049 cm^3 04/25/24 1354   Calculated Wound Volume (cm^3) 0.05 cm^3 04/25/24 1354   Change in Wound Size % 95 04/25/24 1354   Tunneling 1 in depth located at 1-12 o'clock 10/05/23 1348   Number of underminings 1 02/15/24 1344   Undermining 1 0.4 02/15/24 1344   Undermining 1 is depth extending from 9-12 02/15/24 1344   Drainage Amount Moderate 04/25/24 1354   Drainage Description Serosanguineous 04/25/24 1354   Non-staged Wound Description Full thickness 04/25/24 1354   Dressing Status Intact 04/25/24 1354                         Skin Substitute    Universal Protocol:  Consent: Verbal consent obtained.  Consent given by: patient  Patient understanding: patient states understanding of the procedure being performed  Patient identity confirmed: verbally with patient  Performed by: Physician  Product: Grafix PL.    Application Location and Measurements  Location: genitalia / hands / feet / multiple digits  Skin Sub Lot #: [id number]  Skin Sub Expiration: 10Feb2025  Area (sq cm): 0.49  Product Applied (sq cm): 1.6  Product Wasted (sq cm): 0    Application Details  Fenestrated: No  Secured: Yes  Secured With: Steri-Strips  Dressing Applied: Yes  Dressing Comments:  "mepitel  Procedural pain (0-10): insensate  Post-procedural pain: insensate   Response to treatment: procedure was tolerated well   Comments: no wastage as the overlap was circumferential and I did not cut it to remove it but fastened it under the steri-strips                 Wound Instructions:  Orders Placed This Encounter   Procedures    Wound cleansing and dressings Diabetic Ulcer Right;Plantar;Posterior Foot     Wound cleansing and dressings Diabetic Ulcer Right;Plantar;Posterior Foot                        If dressing becomes wet with drainage may change outer dressing only, do not remove the steristrip area only change bulky dressing (kerlix, cast padding, abd and buffy and tape to secure)  Grafix applied weekly at the wound center                                    ·           Wound off loading Diabetic Ulcer Right;Plantar;Posterior Foot                                        Need to keep all weight off of wound, need to use crutches if ambulating     Standing Status:   Future     Standing Expiration Date:   5/2/2024         Susie Luu DPM      Portions of the record may have been created with voice recognition software. Occasional wrong word or \"sound a like\" substitutions may have occurred due to the inherent limitations of voice recognition software. Read the chart carefully and recognize, using context, where substitutions have occurred.     "

## 2024-04-25 NOTE — PATIENT INSTRUCTIONS
Orders Placed This Encounter   Procedures    Wound cleansing and dressings Diabetic Ulcer Right;Plantar;Posterior Foot     Wound cleansing and dressings Diabetic Ulcer Right;Plantar;Posterior Foot                        If dressing becomes wet with drainage may change outer dressing only, do not remove the steristrip area only change bulky dressing (kerlix, cast padding, abd and buffy and tape to secure)  Grafix applied weekly at the wound center                                    ·           Wound off loading Diabetic Ulcer Right;Plantar;Posterior Foot                                        Need to keep all weight off of wound, need to use crutches if ambulating     Standing Status:   Future     Standing Expiration Date:   5/2/2024

## 2024-05-01 ENCOUNTER — CLINICAL SUPPORT (OUTPATIENT)
Dept: BARIATRICS | Facility: CLINIC | Age: 69
End: 2024-05-01

## 2024-05-01 VITALS — WEIGHT: 287.2 LBS | BODY MASS INDEX: 38.06 KG/M2 | HEIGHT: 73 IN

## 2024-05-01 DIAGNOSIS — R63.5 ABNORMAL WEIGHT GAIN: Primary | ICD-10-CM

## 2024-05-01 PROCEDURE — RECHECK

## 2024-05-02 ENCOUNTER — OFFICE VISIT (OUTPATIENT)
Dept: WOUND CARE | Facility: CLINIC | Age: 69
End: 2024-05-02
Payer: MEDICARE

## 2024-05-02 VITALS
DIASTOLIC BLOOD PRESSURE: 61 MMHG | SYSTOLIC BLOOD PRESSURE: 117 MMHG | HEART RATE: 71 BPM | RESPIRATION RATE: 18 BRPM | TEMPERATURE: 97.3 F

## 2024-05-02 DIAGNOSIS — E08.621 DIABETIC ULCER OF OTHER PART OF RIGHT FOOT ASSOCIATED WITH DIABETES MELLITUS DUE TO UNDERLYING CONDITION, WITH FAT LAYER EXPOSED (HCC): Primary | ICD-10-CM

## 2024-05-02 DIAGNOSIS — L97.512 DIABETIC ULCER OF OTHER PART OF RIGHT FOOT ASSOCIATED WITH DIABETES MELLITUS DUE TO UNDERLYING CONDITION, WITH FAT LAYER EXPOSED (HCC): Primary | ICD-10-CM

## 2024-05-02 PROCEDURE — 15275 SKIN SUB GRAFT FACE/NK/HF/G: CPT | Performed by: PODIATRIST

## 2024-05-02 NOTE — PATIENT INSTRUCTIONS
Orders Placed This Encounter   Procedures    Wound Procedure Treatment Diabetic Ulcer Right;Plantar;Posterior Foot     This order was created via procedure documentation    Wound cleansing and dressings Diabetic Ulcer Right;Plantar;Posterior Foot     Wound cleansing and dressings Diabetic Ulcer Right;Plantar;Posterior Foot              If dressing becomes wet with drainage may change outer dressing only, do not remove the steristrip area only change bulky dressing (kerlix, cast padding, abd and buffy and tape to secure)  Grafix applied weekly at the wound center                                    ·           Wound off loading Diabetic Ulcer Right;Plantar;Posterior Foot  Need to keep all weight off of wound, need to use crutches if ambulating     Standing Status:   Future     Standing Expiration Date:   5/9/2024

## 2024-05-02 NOTE — PROGRESS NOTES
Patient ID: Augustus Coffman is a 68 y.o. male Date of Birth 1955       Chief Complaint   Patient presents with    Follow Up Wound Care Visit     Diabetic ulcer right foot       Allergies:  Lisinopril    Diagnosis:  1. Diabetic ulcer of other part of right foot associated with diabetes mellitus due to underlying condition, with fat layer exposed (HCC)  -     Wound Procedure Treatment Diabetic Ulcer Right;Plantar;Posterior Foot  -     Wound cleansing and dressings Diabetic Ulcer Right;Plantar;Posterior Foot; Future       Diagnosis ICD-10-CM Associated Orders   1. Diabetic ulcer of other part of right foot associated with diabetes mellitus due to underlying condition, with fat layer exposed (HCC)  E08.621 Wound Procedure Treatment Diabetic Ulcer Right;Plantar;Posterior Foot    L97.512 Wound cleansing and dressings Diabetic Ulcer Right;Plantar;Posterior Foot           Assessment & Plan:  See wound orders.    Ulcer is steadily but slowly progressing.  Patient is a good candidate for additional Grafix PL Prime next week.  Stay off the foot and avoid long drives.  Follow up one week.      Subjective:   The patient did drive to [facility] twice.  He did lose 1 pound this week with weight management.        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HCC)    Hypertension    Hypercholesteremia    Hammer toe of right foot    Stasis dermatitis of both legs    Diabetic peripheral neuropathy (HCC)    Gout    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HCC)    Obesity with body mass index 30 or greater    Type 2 diabetes mellitus (HCC)    Retroperitoneal hematoma    Mesenteric lymphadenopathy    Acute blood loss anemia    Heart murmur    GI bleed    Pleural effusion    Chronic venous hypertension (idiopathic) with ulcer of right lower extremity (HCC)    Rash    Stage 2 chronic kidney disease    Hypertensive kidney  disease with stage 2 chronic kidney disease    Other proteinuria    Obesity, morbid (HCC)    Follicular lymphoma grade I of lymph nodes of multiple sites (HCC)    Vitamin D deficiency    Stage 3a chronic kidney disease (HCC)    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 (HCC)     Past Medical History:   Diagnosis Date    Chronic kidney disease     Diabetes mellitus (HCC)     Follicular lymphoma (HCC)     High cholesterol     Hypertension      Past Surgical History:   Procedure Laterality Date    BUNIONECTOMY Right 11/24/2020    Procedure: RIGHT HAV CORRECTION,;  Surgeon: Niranjan Child DPM;  [facility];  Service: Podiatry    COLONOSCOPY      INCISION AND DRAINAGE OF WOUND Right 10/05/2016    Procedure: INCISION AND DRAINAGE (I&D) EXTREMITY;  Surgeon: Niranjan Child DPM;  [facility];  Service:     IR BIOPSY LYMPH NODE  07/08/2021    IR EMBOLIZATION (SPECIFY VESSEL OR SITE)  07/08/2021    IR PORT PLACEMENT  9/1/2022    PILONIDAL CYST EXCISION      UT CORRECTION HAMMERTOE Right 02/19/2019    Procedure: THIRD HAMMER TOE CORRECTION;  Surgeon: Niranjan Child DPM;  [facility];  Service: Podiatry    UT RMVL MATEO CTR VAD W/SUBQ PORT/ CTR/PRPH INSJ N/A 3/14/2023    Procedure: REMOVAL VENOUS PORT (PORT-A-CATH)IR;  Surgeon: Avelino Vázquez DO;  [facility];  Service: Interventional Radiology    TOE OSTEOTOMY Right 03/14/2017    Procedure: HAMMERTOE CORRECTION R 2 ;  Surgeon: Niranjan Child DPM;  [facility];  Service:     TOE OSTEOTOMY Left 11/24/2020    Procedure: LEFT HT CORRECTION TOE;  Surgeon: Niranjan Child DPM;  [facility];  Service: Podiatry    TONSILLECTOMY  1963     Social History     Socioeconomic History    Marital status: /Civil Union     Spouse name: None    Number of children: None    Years of education: None    Highest education level: None   Occupational History    None   Tobacco Use    Smoking status: Never    Smokeless tobacco: Never    Tobacco comments:      Never smoked but exposed to second hand smoke from birth until 1980's   Vaping Use    Vaping status: Never Used   Substance and Sexual Activity    Alcohol use: No    Drug use: No    Sexual activity: Not Currently     Partners: Female     Birth control/protection: Abstinence   Other Topics Concern    None   Social History Narrative    None     Social Determinants of Health     Financial Resource Strain: Not on file   Food Insecurity: Not on file   Transportation Needs: Not on file   Physical Activity: Not on file   Stress: Not on file   Social Connections: Not on file   Intimate Partner Violence: Not on file   Housing Stability: Not on file        Current Outpatient Medications:     allopurinol (ZYLOPRIM) 300 mg tablet, Take 1 tablet (300 mg total) by mouth daily, Disp: 90 tablet, Rfl: 1    amLODIPine (NORVASC) 10 mg tablet, Take 1 tablet (10 mg total) by mouth daily, Disp: 90 tablet, Rfl: 1    aspirin 81 mg chewable tablet, Chew 81 mg daily, Disp: , Rfl:     Cholecalciferol 100 MCG (4000 UT) CAPS, Take 1 capsule (4,000 Units total) by mouth in the morning, Disp: , Rfl:     Empagliflozin (Jardiance) 25 MG TABS, Take 1 tablet (25 mg total) by mouth every morning, Disp: 90 tablet, Rfl: 2    hydroCHLOROthiazide 25 mg tablet, Take 1 tablet (25 mg total) by mouth daily, Disp: 90 tablet, Rfl: 1    losartan (COZAAR) 100 MG tablet, Take 1 tablet (100 mg total) by mouth daily, Disp: 90 tablet, Rfl: 1    metFORMIN (GLUCOPHAGE) 500 mg tablet, Take 2 tablets (1,000 mg total) by mouth 2 (two) times a day with meals, Disp: 180 tablet, Rfl: 1    metoprolol succinate (TOPROL-XL) 100 mg 24 hr tablet, Take 1 tablet (100 mg total) by mouth every evening, Disp: 90 tablet, Rfl: 1    Multiple Vitamins-Minerals (Centrum Silver 50+Men) TABS, , Disp: , Rfl:     rosuvastatin (CRESTOR) 40 MG tablet, Take 1 tablet (40 mg total) by mouth every evening, Disp: 90 tablet, Rfl: 1  Family History   Problem Relation Age of Onset    Cancer Father          Leukemia      Review of Systems   Constitutional:  Negative for chills and fever.         Objective:  /61   Pulse 71   Temp (!) 97.3 °F (36.3 °C)   Resp 18     Physical Exam  Neurological:      Mental Status: He is alert.             Wound 09/01/22 Incision Chest Anterior;Right (Active)       Wound 03/14/23 Chest Right (Active)       Wound 09/07/23 Diabetic Ulcer Foot Right;Plantar;Posterior (Active)   Enter Singh score: Singh Grade 1: Partial or full-thickness ulcer (superficial) 04/25/24 1354   Wound Image   05/02/24 1347   Wound Description Pink;Epithelialization 05/02/24 1347   Delisa-wound Assessment Maceration;Fragile 05/02/24 1347   Wound Length (cm) 0.7 cm 05/02/24 1347   Wound Width (cm) 0.7 cm 05/02/24 1347   Wound Depth (cm) 0.1 cm 05/02/24 1347   Wound Surface Area (cm^2) 0.49 cm^2 05/02/24 1347   Wound Volume (cm^3) 0.049 cm^3 05/02/24 1347   Calculated Wound Volume (cm^3) 0.05 cm^3 05/02/24 1347   Change in Wound Size % 95 05/02/24 1347   Tunneling 1 in depth located at 1-12 o'clock 10/05/23 1348   Number of underminings 1 02/15/24 1344   Undermining 1 0.4 02/15/24 1344   Undermining 1 is depth extending from 9-12 02/15/24 1344   Drainage Amount Moderate 05/02/24 1347   Drainage Description Serosanguineous 05/02/24 1347   Non-staged Wound Description Full thickness 05/02/24 1347   Dressing Status Intact 05/02/24 1347                         Skin Substitute    Universal Protocol:  Consent: Verbal consent obtained.  Consent given by: patient  Patient understanding: patient states understanding of the procedure being performed  Patient identity confirmed: verbally with patient  Performed by: Physician  Product: Grafix PL.    Application Location and Measurements  Location: genitalia / hands / feet / multiple digits  Skin Sub Lot #: [id number]  Skin Sub Expiration: [id number]  Area (sq cm): 0.49  Product Applied (sq cm): 1  Product Wasted (sq cm): 0.6    Application Details  Fenestrated:  "No  Secured: Yes  Secured With: Steri-Strips  Dressing Applied: Yes  Dressing Comments: mepitel  Procedural pain (0-10): insensate  Post-procedural pain: insensate   Response to treatment: procedure was tolerated well                 Wound Instructions:  Orders Placed This Encounter   Procedures    Wound Procedure Treatment Diabetic Ulcer Right;Plantar;Posterior Foot     This order was created via procedure documentation    Wound cleansing and dressings Diabetic Ulcer Right;Plantar;Posterior Foot     Wound cleansing and dressings Diabetic Ulcer Right;Plantar;Posterior Foot              If dressing becomes wet with drainage may change outer dressing only, do not remove the steristrip area only change bulky dressing (kerlix, cast padding, abd and buffy and tape to secure)  Grafix applied weekly at the wound center                                    ·           Wound off loading Diabetic Ulcer Right;Plantar;Posterior Foot  Need to keep all weight off of wound, need to use crutches if ambulating     Standing Status:   Future     Standing Expiration Date:   5/9/2024    Skin Substitute     This order was created via procedure documentation         Susie Luu DPM      Portions of the record may have been created with voice recognition software. Occasional wrong word or \"sound a like\" substitutions may have occurred due to the inherent limitations of voice recognition software. Read the chart carefully and recognize, using context, where substitutions have occurred.     "

## 2024-05-02 NOTE — PROGRESS NOTES
Wound Procedure Treatment Diabetic Ulcer Right;Plantar;Posterior Foot    Performed by: Rohini Roche RN  Authorized by: Susie Luu DPM  Associated wounds:   Wound 09/07/23 Diabetic Ulcer Foot Right;Plantar;Posterior  Wound cleansed with:  Hibiclens  Applied to periwound:  Skin prep  Applied secondary dressing:  ABD, Cast padding and Gauze  Wound secured with:  Kerlix, Tape and Coban    Provider applied skin graft covered with mepitel and steri strips.  Nurse applied bulky dressing

## 2024-05-06 ENCOUNTER — RA CDI HCC (OUTPATIENT)
Dept: OTHER | Facility: HOSPITAL | Age: 69
End: 2024-05-06

## 2024-05-08 ENCOUNTER — CLINICAL SUPPORT (OUTPATIENT)
Dept: BARIATRICS | Facility: CLINIC | Age: 69
End: 2024-05-08

## 2024-05-08 VITALS — HEIGHT: 73 IN | WEIGHT: 282.6 LBS | BODY MASS INDEX: 37.46 KG/M2

## 2024-05-08 DIAGNOSIS — R63.5 ABNORMAL WEIGHT GAIN: Primary | ICD-10-CM

## 2024-05-08 PROCEDURE — RECHECK

## 2024-05-09 ENCOUNTER — OFFICE VISIT (OUTPATIENT)
Dept: WOUND CARE | Facility: CLINIC | Age: 69
End: 2024-05-09
Payer: MEDICARE

## 2024-05-09 VITALS
HEART RATE: 73 BPM | SYSTOLIC BLOOD PRESSURE: 120 MMHG | RESPIRATION RATE: 16 BRPM | TEMPERATURE: 97.4 F | DIASTOLIC BLOOD PRESSURE: 59 MMHG

## 2024-05-09 DIAGNOSIS — E08.621 DIABETIC ULCER OF OTHER PART OF RIGHT FOOT ASSOCIATED WITH DIABETES MELLITUS DUE TO UNDERLYING CONDITION, LIMITED TO BREAKDOWN OF SKIN (HCC): ICD-10-CM

## 2024-05-09 DIAGNOSIS — L97.512 DIABETIC ULCER OF OTHER PART OF RIGHT FOOT ASSOCIATED WITH DIABETES MELLITUS DUE TO UNDERLYING CONDITION, WITH FAT LAYER EXPOSED (HCC): Primary | ICD-10-CM

## 2024-05-09 DIAGNOSIS — E08.621 DIABETIC ULCER OF OTHER PART OF RIGHT FOOT ASSOCIATED WITH DIABETES MELLITUS DUE TO UNDERLYING CONDITION, WITH FAT LAYER EXPOSED (HCC): Primary | ICD-10-CM

## 2024-05-09 DIAGNOSIS — L97.511 DIABETIC ULCER OF OTHER PART OF RIGHT FOOT ASSOCIATED WITH DIABETES MELLITUS DUE TO UNDERLYING CONDITION, LIMITED TO BREAKDOWN OF SKIN (HCC): ICD-10-CM

## 2024-05-09 PROCEDURE — 15275 SKIN SUB GRAFT FACE/NK/HF/G: CPT | Performed by: PODIATRIST

## 2024-05-09 NOTE — PROGRESS NOTES
Wound Procedure Treatment Diabetic Ulcer Right;Plantar;Posterior Foot    Performed by: Maryana Bocanegra RN  Authorized by: Susie Luu DPM  Associated wounds:   Wound 09/07/23 Diabetic Ulcer Foot Right;Plantar;Posterior  Applied to periwound:  Skin prep  Applied secondary dressing:  ABD, Gauze and Cast padding  Wound secured with:  Coban, Julisa and Tape    Apligraf Skin Graft Applied  Bulky Dressing Applied

## 2024-05-09 NOTE — PROGRESS NOTES
Patient ID: Augustus Coffman is a 68 y.o. male Date of Birth 1955       Chief Complaint   Patient presents with    Follow Up Wound Care Visit     Right Foot Wound       Allergies:  Lisinopril    Diagnosis:  1. Diabetic ulcer of other part of right foot associated with diabetes mellitus due to underlying condition, with fat layer exposed (HCC)  -     Wound cleansing and dressings Diabetic Ulcer Right;Plantar;Posterior Foot; Future  -     Wound cleansing and dressings Diabetic Ulcer Right;Plantar;Posterior Foot; Future  -     Wound Procedure Treatment Diabetic Ulcer Right;Plantar;Posterior Foot  -     Skin Substitute    2. Diabetic ulcer of other part of right foot associated with diabetes mellitus due to underlying condition, limited to breakdown of skin (HCC)  -     Wound cleansing and dressings Diabetic Ulcer Right;Plantar;Posterior Foot; Future  -     Wound cleansing and dressings Diabetic Ulcer Right;Plantar;Posterior Foot; Future  -     Wound Procedure Treatment Diabetic Ulcer Right;Plantar;Posterior Foot       Diagnosis ICD-10-CM Associated Orders   1. Diabetic ulcer of other part of right foot associated with diabetes mellitus due to underlying condition, with fat layer exposed (HCC)  E08.621 Wound cleansing and dressings Diabetic Ulcer Right;Plantar;Posterior Foot    L97.512 Wound cleansing and dressings Diabetic Ulcer Right;Plantar;Posterior Foot     Wound Procedure Treatment Diabetic Ulcer Right;Plantar;Posterior Foot     Skin Substitute      2. Diabetic ulcer of other part of right foot associated with diabetes mellitus due to underlying condition, limited to breakdown of skin (HCC)  E08.621 Wound cleansing and dressings Diabetic Ulcer Right;Plantar;Posterior Foot    L97.511 Wound cleansing and dressings Diabetic Ulcer Right;Plantar;Posterior Foot     Wound Procedure Treatment Diabetic Ulcer Right;Plantar;Posterior Foot           Assessment & Plan:  See wound orders.     This rosa grade two ulcer  is healing steadily with off-loading, BS control and skin substitute application which is helping to push the wound toward healing.  Patient must continue to follow strict orders if he is to expect full healing.  This is reviewed every visit.  Plan is for another graffix PL prime application next week.    Subjective:   The patient notes he did a little driving this week.  Otherwise he lost two pounds on his weight management diet.  He is starting to feel more energetic as a result.  No pain in the foot.        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HCC)    Hypertension    Hypercholesteremia    Hammer toe of right foot    Stasis dermatitis of both legs    Diabetic peripheral neuropathy (HCC)    Gout    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HCC)    Obesity with body mass index 30 or greater    Type 2 diabetes mellitus (HCC)    Retroperitoneal hematoma    Mesenteric lymphadenopathy    Acute blood loss anemia    Heart murmur    GI bleed    Pleural effusion    Chronic venous hypertension (idiopathic) with ulcer of right lower extremity (HCC)    Rash    Stage 2 chronic kidney disease    Hypertensive kidney disease with stage 2 chronic kidney disease    Other proteinuria    Obesity, morbid (HCC)    Follicular lymphoma grade I of lymph nodes of multiple sites (HCC)    Vitamin D deficiency    Stage 3a chronic kidney disease (HCC)    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 (HCC)     Past Medical History:   Diagnosis Date    Chronic kidney disease     Diabetes mellitus (HCC)     Follicular lymphoma (HCC)     High cholesterol     Hypertension      Past Surgical History:   Procedure Laterality Date    BUNIONECTOMY Right 11/24/2020    Procedure: RIGHT HAV CORRECTION,;  Surgeon: Niranjan Child DPM;  [facility];  Service: Podiatry    COLONOSCOPY      INCISION AND DRAINAGE OF WOUND Right 10/05/2016    Procedure:  INCISION AND DRAINAGE (I&D) EXTREMITY;  Surgeon: Niranjan Child DPM;  [facility];  Service:     IR BIOPSY LYMPH NODE  07/08/2021    IR EMBOLIZATION (SPECIFY VESSEL OR SITE)  07/08/2021    IR PORT PLACEMENT  9/1/2022    PILONIDAL CYST EXCISION      [place] CORRECTION HAMMERTOE Right 02/19/2019    Procedure: THIRD HAMMER TOE CORRECTION;  Surgeon: Niranjan Child DPM;  [facility];  Service: Podiatry    AZ RMVL MATEO CTR VAD W/SUBQ PORT/ CTR/PRPH INSJ N/A 3/14/2023    Procedure: REMOVAL VENOUS PORT (PORT-A-CATH)IR;  Surgeon: Avelino Vázquez DO;  [facility];  Service: Interventional Radiology    TOE OSTEOTOMY Right 03/14/2017    Procedure: HAMMERTOE CORRECTION R 2 ;  Surgeon: Niranjan Child DPM;  [facility];  Service:     TOE OSTEOTOMY Left 11/24/2020    Procedure: LEFT HT CORRECTION TOE;  Surgeon: Niranjan Child DPM;  [facility];  Service: Podiatry    TONSILLECTOMY  1963     Social History     Socioeconomic History    Marital status: /Civil Union     Spouse name: None    Number of children: None    Years of education: None    Highest education level: None   Occupational History    None   Tobacco Use    Smoking status: Never    Smokeless tobacco: Never    Tobacco comments:     Never smoked but exposed to second hand smoke from birth until 1980's   Vaping Use    Vaping status: Never Used   Substance and Sexual Activity    Alcohol use: No    Drug use: No    Sexual activity: Not Currently     Partners: Female     Birth control/protection: Abstinence   Other Topics Concern    None   Social History Narrative    None     Social Determinants of Health     Financial Resource Strain: Not on file   Food Insecurity: Not on file   Transportation Needs: Not on file   Physical Activity: Not on file   Stress: Not on file   Social Connections: Not on file   Intimate Partner Violence: Not on file   Housing Stability: Not on file        Current Outpatient Medications:     allopurinol  (ZYLOPRIM) 300 mg tablet, Take 1 tablet (300 mg total) by mouth daily, Disp: 90 tablet, Rfl: 1    amLODIPine (NORVASC) 10 mg tablet, Take 1 tablet (10 mg total) by mouth daily, Disp: 90 tablet, Rfl: 1    aspirin 81 mg chewable tablet, Chew 81 mg daily, Disp: , Rfl:     Cholecalciferol 100 MCG (4000 UT) CAPS, Take 1 capsule (4,000 Units total) by mouth in the morning, Disp: , Rfl:     Empagliflozin (Jardiance) 25 MG TABS, Take 1 tablet (25 mg total) by mouth every morning, Disp: 90 tablet, Rfl: 2    hydroCHLOROthiazide 25 mg tablet, Take 1 tablet (25 mg total) by mouth daily, Disp: 90 tablet, Rfl: 1    losartan (COZAAR) 100 MG tablet, Take 1 tablet (100 mg total) by mouth daily, Disp: 90 tablet, Rfl: 1    metFORMIN (GLUCOPHAGE) 500 mg tablet, Take 2 tablets (1,000 mg total) by mouth 2 (two) times a day with meals, Disp: 180 tablet, Rfl: 1    metoprolol succinate (TOPROL-XL) 100 mg 24 hr tablet, Take 1 tablet (100 mg total) by mouth every evening, Disp: 90 tablet, Rfl: 1    Multiple Vitamins-Minerals (Centrum Silver 50+Men) TABS, , Disp: , Rfl:     rosuvastatin (CRESTOR) 40 MG tablet, Take 1 tablet (40 mg total) by mouth every evening, Disp: 90 tablet, Rfl: 1  Family History   Problem Relation Age of Onset    Cancer Father         Leukemia      Review of Systems   Constitutional:  Negative for chills and fever.         Objective:  /59   Pulse 73   Temp (!) 97.4 °F (36.3 °C)   Resp 16     Physical Exam  Neurological:      Mental Status: He is alert.             Wound 09/01/22 Incision Chest Anterior;Right (Active)       Wound 03/14/23 Chest Right (Active)       Wound 09/07/23 Diabetic Ulcer Foot Right;Plantar;Posterior (Active)   Enter Singh score: Singh Grade 1: Partial or full-thickness ulcer (superficial) 05/09/24 1353   Wound Image   05/09/24 1353   Wound Description Pink;Epithelialization 05/09/24 1353   Delisa-wound Assessment Maceration;Fragile 05/09/24 1353   Wound Length (cm) 0.6 cm 05/09/24 1353    Wound Width (cm) 0.6 cm 05/09/24 1353   Wound Depth (cm) 0.1 cm 05/09/24 1353   Wound Surface Area (cm^2) 0.36 cm^2 05/09/24 1353   Wound Volume (cm^3) 0.036 cm^3 05/09/24 1353   Calculated Wound Volume (cm^3) 0.04 cm^3 05/09/24 1353   Change in Wound Size % 96 05/09/24 1353   Tunneling 1 in depth located at 1-12 o'clock 10/05/23 1348   Number of underminings 1 02/15/24 1344   Undermining 1 0.4 02/15/24 1344   Undermining 1 is depth extending from 9-12 02/15/24 1344   Drainage Amount Moderate 05/09/24 1353   Drainage Description Serosanguineous 05/09/24 1353   Non-staged Wound Description Full thickness 05/09/24 1353   Dressing Status Intact 05/09/24 1353                         Skin Substitute    Universal Protocol:  Consent: Verbal consent obtained.  Consent given by: patient  Patient understanding: patient states understanding of the procedure being performed  Patient identity confirmed: verbally with patient  Performed by: Physician  Product: Grafix PL.    Application Location and Measurements  Location: genitalia / hands / feet / multiple digits  Skin Sub Lot #: [id number]  Skin Sub Expiration: 10FEB2025  Area (sq cm): 0.36  Product Applied (sq cm): 0.8  Product Wasted (sq cm): 0.8    Application Details  Fenestrated: No  Secured: Yes  Secured With: Steri-Strips  Dressing Applied: Yes  Dressing Comments: mepitel  Procedural pain (0-10): insensate  Post-procedural pain: insensate   Response to treatment: procedure was tolerated well                 Wound Instructions:  Orders Placed This Encounter   Procedures    Wound cleansing and dressings Diabetic Ulcer Right;Plantar;Posterior Foot     Right Foot Wound    Wound cleansing and dressings Diabetic Ulcer Right;Plantar;Posterior Foot If dressing becomes wet with drainage may change outer dressing only, do not remove the steristrip area only change bulky dressing (kerlix, cast padding, abd and buffy and tape to secure)     Grafix applied weekly at the wound  "center     Standing Status:   Future     Standing Expiration Date:   5/16/2024    Wound cleansing and dressings Diabetic Ulcer Right;Plantar;Posterior Foot     Wound off loading Diabetic Ulcer Right;Plantar;Posterior Foot    Need to keep all weight off of wound, need to use crutches if ambulating     Standing Status:   Future     Standing Expiration Date:   5/16/2024    Wound Procedure Treatment Diabetic Ulcer Right;Plantar;Posterior Foot     This order was created via procedure documentation    Skin Substitute     This order was created via procedure documentation         Susie Luu DPM      Portions of the record may have been created with voice recognition software. Occasional wrong word or \"sound a like\" substitutions may have occurred due to the inherent limitations of voice recognition software. Read the chart carefully and recognize, using context, where substitutions have occurred.     "

## 2024-05-09 NOTE — PATIENT INSTRUCTIONS
Orders Placed This Encounter   Procedures    Wound cleansing and dressings Diabetic Ulcer Right;Plantar;Posterior Foot     Right Foot Wound    Wound cleansing and dressings Diabetic Ulcer Right;Plantar;Posterior Foot If dressing becomes wet with drainage may change outer dressing only, do not remove the steristrip area only change bulky dressing (kerlix, cast padding, abd and buffy and tape to secure)     Grafix applied weekly at the wound center     Standing Status:   Future     Standing Expiration Date:   5/16/2024    Wound cleansing and dressings Diabetic Ulcer Right;Plantar;Posterior Foot     Wound off loading Diabetic Ulcer Right;Plantar;Posterior Foot    Need to keep all weight off of wound, need to use crutches if ambulating     Standing Status:   Future     Standing Expiration Date:   5/16/2024

## 2024-05-09 NOTE — PROGRESS NOTES
Patient ID: Augustus Coffman is a 68 y.o. male Date of Birth 1955       Chief Complaint   Patient presents with    Follow Up Wound Care Visit     Right Foot Wound       Allergies:  Lisinopril    Diagnosis:  1. Diabetic ulcer of other part of right foot associated with diabetes mellitus due to underlying condition, with fat layer exposed (HCC)  -     Wound cleansing and dressings Diabetic Ulcer Right;Plantar;Posterior Foot; Future  -     Wound cleansing and dressings Diabetic Ulcer Right;Plantar;Posterior Foot; Future  -     Wound Procedure Treatment Diabetic Ulcer Right;Plantar;Posterior Foot    2. Diabetic ulcer of other part of right foot associated with diabetes mellitus due to underlying condition, limited to breakdown of skin (HCC)  -     Wound cleansing and dressings Diabetic Ulcer Right;Plantar;Posterior Foot; Future  -     Wound cleansing and dressings Diabetic Ulcer Right;Plantar;Posterior Foot; Future  -     Wound Procedure Treatment Diabetic Ulcer Right;Plantar;Posterior Foot       Diagnosis ICD-10-CM Associated Orders   1. Diabetic ulcer of other part of right foot associated with diabetes mellitus due to underlying condition, with fat layer exposed (HCC)  E08.621 Wound cleansing and dressings Diabetic Ulcer Right;Plantar;Posterior Foot    L97.512 Wound cleansing and dressings Diabetic Ulcer Right;Plantar;Posterior Foot     Wound Procedure Treatment Diabetic Ulcer Right;Plantar;Posterior Foot      2. Diabetic ulcer of other part of right foot associated with diabetes mellitus due to underlying condition, limited to breakdown of skin (HCC)  E08.621 Wound cleansing and dressings Diabetic Ulcer Right;Plantar;Posterior Foot    L97.511 Wound cleansing and dressings Diabetic Ulcer Right;Plantar;Posterior Foot     Wound Procedure Treatment Diabetic Ulcer Right;Plantar;Posterior Foot           Assessment & Plan:  See wound orders.    This rosa grade 2 diabetic ulcer has steadily gotten better over  time.  The graffix PL prime skin sub has helped to push this wound toward healing.  Will plan for an additional application next week.  The patient is always reminded to stay off the foot and follow his weight loss diet which has helped to keep his BS under tight control.  Follow up one week.    Subjective:   The patient did do a little driving this week.  Otherwise he lost 2 more pounds and is feeling more energetic overall.           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HCC)    Hypertension    Hypercholesteremia    Hammer toe of right foot    Stasis dermatitis of both legs    Diabetic peripheral neuropathy (HCC)    Gout    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HCC)    Obesity with body mass index 30 or greater    Type 2 diabetes mellitus (HCC)    Retroperitoneal hematoma    Mesenteric lymphadenopathy    Acute blood loss anemia    Heart murmur    GI bleed    Pleural effusion    Chronic venous hypertension (idiopathic) with ulcer of right lower extremity (HCC)    Rash    Stage 2 chronic kidney disease    Hypertensive kidney disease with stage 2 chronic kidney disease    Other proteinuria    Obesity, morbid (HCC)    Follicular lymphoma grade I of lymph nodes of multiple sites (HCC)    Vitamin D deficiency    Stage 3a chronic kidney disease (HCC)    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 (HCC)     Past Medical History:   Diagnosis Date    Chronic kidney disease     Diabetes mellitus (HCC)     Follicular lymphoma (HCC)     High cholesterol     Hypertension      Past Surgical History:   Procedure Laterality Date    BUNIONECTOMY Right 11/24/2020    Procedure: RIGHT HAV CORRECTION,;  Surgeon: Niranjan Child DPM;  [facility];  Service: Podiatry    COLONOSCOPY      INCISION AND DRAINAGE OF WOUND Right 10/05/2016    Procedure: INCISION AND DRAINAGE (I&D) EXTREMITY;  Surgeon: Niranjan Child DPM;   Location: [facility];  Service:     IR BIOPSY LYMPH NODE  07/08/2021    IR EMBOLIZATION (SPECIFY VESSEL OR SITE)  07/08/2021    IR PORT PLACEMENT  9/1/2022    PILONIDAL CYST EXCISION      VT CORRECTION HAMMERTOE Right 02/19/2019    Procedure: THIRD HAMMER TOE CORRECTION;  Surgeon: Niranjan Child DPM;  Location: [facility];  Service: Podiatry    VT RMVL MATEO CTR VAD W/SUBQ PORT/ CTR/PRPH INSJ N/A 3/14/2023    Procedure: REMOVAL VENOUS PORT (PORT-A-CATH)IR;  Surgeon: Avelino Vázquez DO;  [facility];  Service: Interventional Radiology    TOE OSTEOTOMY Right 03/14/2017    Procedure: HAMMERTOE CORRECTION R 2 ;  Surgeon: Niranjan Child DPM;  [facility];  Service:     TOE OSTEOTOMY Left 11/24/2020    Procedure: LEFT HT CORRECTION TOE;  Surgeon: Niranjan Child DPM;  [facility];  Service: Podiatry    TONSILLECTOMY  1963     Social History     Socioeconomic History    Marital status: /Civil Union     Spouse name: None    Number of children: None    Years of education: None    Highest education level: None   Occupational History    None   Tobacco Use    Smoking status: Never    Smokeless tobacco: Never    Tobacco comments:     Never smoked but exposed to second hand smoke from birth until 1980's   Vaping Use    Vaping status: Never Used   Substance and Sexual Activity    Alcohol use: No    Drug use: No    Sexual activity: Not Currently     Partners: Female     Birth control/protection: Abstinence   Other Topics Concern    None   Social History Narrative    None     Social Determinants of Health     Financial Resource Strain: Not on file   Food Insecurity: Not on file   Transportation Needs: Not on file   Physical Activity: Not on file   Stress: Not on file   Social Connections: Not on file   Intimate Partner Violence: Not on file   Housing Stability: Not on file        Current Outpatient Medications:     allopurinol (ZYLOPRIM) 300 mg tablet, Take 1 tablet (300 mg total) by mouth daily,  Disp: 90 tablet, Rfl: 1    amLODIPine (NORVASC) 10 mg tablet, Take 1 tablet (10 mg total) by mouth daily, Disp: 90 tablet, Rfl: 1    aspirin 81 mg chewable tablet, Chew 81 mg daily, Disp: , Rfl:     Cholecalciferol 100 MCG (4000 UT) CAPS, Take 1 capsule (4,000 Units total) by mouth in the morning, Disp: , Rfl:     Empagliflozin (Jardiance) 25 MG TABS, Take 1 tablet (25 mg total) by mouth every morning, Disp: 90 tablet, Rfl: 2    hydroCHLOROthiazide 25 mg tablet, Take 1 tablet (25 mg total) by mouth daily, Disp: 90 tablet, Rfl: 1    losartan (COZAAR) 100 MG tablet, Take 1 tablet (100 mg total) by mouth daily, Disp: 90 tablet, Rfl: 1    metFORMIN (GLUCOPHAGE) 500 mg tablet, Take 2 tablets (1,000 mg total) by mouth 2 (two) times a day with meals, Disp: 180 tablet, Rfl: 1    metoprolol succinate (TOPROL-XL) 100 mg 24 hr tablet, Take 1 tablet (100 mg total) by mouth every evening, Disp: 90 tablet, Rfl: 1    Multiple Vitamins-Minerals (Centrum Silver 50+Men) TABS, , Disp: , Rfl:     rosuvastatin (CRESTOR) 40 MG tablet, Take 1 tablet (40 mg total) by mouth every evening, Disp: 90 tablet, Rfl: 1  Family History   Problem Relation Age of Onset    Cancer Father         Leukemia      Review of Systems   Constitutional:  Negative for chills and fever.         Objective:  /59   Pulse 73   Temp (!) 97.4 °F (36.3 °C)   Resp 16     Physical Exam  Neurological:      Mental Status: He is alert.             Wound 09/01/22 Incision Chest Anterior;Right (Active)       Wound 03/14/23 Chest Right (Active)       Wound 09/07/23 Diabetic Ulcer Foot Right;Plantar;Posterior (Active)   Enter Singh score: Singh Grade 1: Partial or full-thickness ulcer (superficial) 05/09/24 1353   Wound Image   05/09/24 1353   Wound Description Pink;Epithelialization 05/09/24 1353   Delisa-wound Assessment Maceration;Fragile 05/09/24 1353   Wound Length (cm) 0.6 cm 05/09/24 1353   Wound Width (cm) 0.6 cm 05/09/24 1353   Wound Depth (cm) 0.1 cm 05/09/24  1353   Wound Surface Area (cm^2) 0.36 cm^2 05/09/24 1353   Wound Volume (cm^3) 0.036 cm^3 05/09/24 1353   Calculated Wound Volume (cm^3) 0.04 cm^3 05/09/24 1353   Change in Wound Size % 96 05/09/24 1353   Tunneling 1 in depth located at 1-12 o'clock 10/05/23 1348   Number of underminings 1 02/15/24 1344   Undermining 1 0.4 02/15/24 1344   Undermining 1 is depth extending from 9-12 02/15/24 1344   Drainage Amount Moderate 05/09/24 1353   Drainage Description Serosanguineous 05/09/24 1353   Non-staged Wound Description Full thickness 05/09/24 1353   Dressing Status Intact 05/09/24 1353                         Skin Substitute    Universal Protocol:  Consent: Verbal consent obtained.  Consent given by: patient  Patient understanding: patient states understanding of the procedure being performed  Patient identity confirmed: verbally with patient  Performed by: Physician  Product: Grafix PL.    Application Location and Measurements  Location: genitalia / hands / feet / multiple digits  Skin Sub Lot #: [id number]  Skin Sub Expiration: 10FEB2025  Area (sq cm): 0.36  Product Applied (sq cm): 0.8  Product Wasted (sq cm): 0.8    Application Details  Fenestrated: No  Secured: Yes  Secured With: Steri-Strips  Dressing Applied: Yes  Dressing Comments: mepitel  Procedural pain (0-10): insensate  Post-procedural pain: insensate   Response to treatment: procedure was tolerated well                 Wound Instructions:  Orders Placed This Encounter   Procedures    Wound cleansing and dressings Diabetic Ulcer Right;Plantar;Posterior Foot     Right Foot Wound    Wound cleansing and dressings Diabetic Ulcer Right;Plantar;Posterior Foot If dressing becomes wet with drainage may change outer dressing only, do not remove the steristrip area only change bulky dressing (kerlix, cast padding, abd and buffy and tape to secure)     Grafix applied weekly at the wound center     Standing Status:   Future     Standing Expiration Date:   5/16/2024  "   Wound cleansing and dressings Diabetic Ulcer Right;Plantar;Posterior Foot     Wound off loading Diabetic Ulcer Right;Plantar;Posterior Foot    Need to keep all weight off of wound, need to use crutches if ambulating     Standing Status:   Future     Standing Expiration Date:   5/16/2024    Wound Procedure Treatment Diabetic Ulcer Right;Plantar;Posterior Foot     This order was created via procedure documentation         Susie Luu DPM      Portions of the record may have been created with voice recognition software. Occasional wrong word or \"sound a like\" substitutions may have occurred due to the inherent limitations of voice recognition software. Read the chart carefully and recognize, using context, where substitutions have occurred.     "

## 2024-05-13 ENCOUNTER — OFFICE VISIT (OUTPATIENT)
Dept: FAMILY MEDICINE CLINIC | Facility: CLINIC | Age: 69
End: 2024-05-13
Payer: MEDICARE

## 2024-05-13 VITALS
DIASTOLIC BLOOD PRESSURE: 64 MMHG | BODY MASS INDEX: 37.64 KG/M2 | TEMPERATURE: 98.4 F | OXYGEN SATURATION: 96 % | WEIGHT: 284 LBS | HEIGHT: 73 IN | SYSTOLIC BLOOD PRESSURE: 112 MMHG | HEART RATE: 74 BPM

## 2024-05-13 DIAGNOSIS — I77.810 ECTATIC THORACIC AORTA (HCC): Primary | ICD-10-CM

## 2024-05-13 DIAGNOSIS — E11.22 TYPE 2 DIABETES MELLITUS WITH STAGE 3 CHRONIC KIDNEY DISEASE, UNSPECIFIED WHETHER LONG TERM INSULIN USE, UNSPECIFIED WHETHER STAGE 3A OR 3B CKD (HCC): ICD-10-CM

## 2024-05-13 DIAGNOSIS — E13.9 DIABETES 1.5, MANAGED AS TYPE 2 (HCC): ICD-10-CM

## 2024-05-13 DIAGNOSIS — N18.30 TYPE 2 DIABETES MELLITUS WITH STAGE 3 CHRONIC KIDNEY DISEASE, UNSPECIFIED WHETHER LONG TERM INSULIN USE, UNSPECIFIED WHETHER STAGE 3A OR 3B CKD (HCC): ICD-10-CM

## 2024-05-13 PROCEDURE — G2211 COMPLEX E/M VISIT ADD ON: HCPCS | Performed by: FAMILY MEDICINE

## 2024-05-13 PROCEDURE — 99214 OFFICE O/P EST MOD 30 MIN: CPT | Performed by: FAMILY MEDICINE

## 2024-05-13 RX ORDER — ROSUVASTATIN CALCIUM 40 MG/1
40 TABLET, COATED ORAL EVERY EVENING
Qty: 90 TABLET | Refills: 1 | Status: SHIPPED | OUTPATIENT
Start: 2024-05-13

## 2024-05-13 NOTE — PROGRESS NOTES
"Assessment/Plan:    No problem-specific Assessment & Plan notes found for this encounter.     Diagnoses and all orders for this visit:    Ectatic thoracic aorta (HCC)    Diabetes 1.5, managed as type 2 (HCC)  -     rosuvastatin (CRESTOR) 40 MG tablet; Take 1 tablet (40 mg total) by mouth every evening    Type 2 diabetes mellitus with stage 3 chronic kidney disease, unspecified whether long term insulin use, unspecified whether stage 3a or 3b CKD (HCC)  -     rosuvastatin (CRESTOR) 40 MG tablet; Take 1 tablet (40 mg total) by mouth every evening          Subjective:      Patient ID: Augustus Coffman is a 68 y.o. male.    HPI    Patient presents to the office for follow up. States that overall, things are going well. Following with wound care. Following with heme/onc. No CP or SOB.     The following portions of the patient's history were reviewed and updated as appropriate: allergies, current medications, past family history, past medical history, past social history, past surgical history, and problem list.    Review of Systems      Objective:  /64   Pulse 74   Temp 98.4 °F (36.9 °C)   Ht 6' 1\" (1.854 m)   Wt 129 kg (284 lb)   SpO2 96%   BMI 37.47 kg/m²     Physical Exam  Vitals reviewed.   Constitutional:       General: He is not in acute distress.     Appearance: Normal appearance. He is obese.   HENT:      Head: Normocephalic and atraumatic.      Right Ear: External ear normal.      Left Ear: External ear normal.      Nose: Nose normal.      Mouth/Throat:      Mouth: Mucous membranes are moist.   Eyes:      Extraocular Movements: Extraocular movements intact.      Conjunctiva/sclera: Conjunctivae normal.      Pupils: Pupils are equal, round, and reactive to light.   Cardiovascular:      Rate and Rhythm: Normal rate and regular rhythm.      Heart sounds: Normal heart sounds.   Pulmonary:      Breath sounds: Normal breath sounds.   Abdominal:      General: Bowel sounds are normal. There is no " distension.      Palpations: Abdomen is soft. There is no mass.      Tenderness: There is no abdominal tenderness. There is no guarding.   Musculoskeletal:      Right lower leg: No edema.      Left lower leg: No edema.   Skin:     General: Skin is warm.      Capillary Refill: Capillary refill takes less than 2 seconds.   Neurological:      Mental Status: He is alert. Mental status is at baseline.         DO Justin Garza [facility]  5/24/2024 7:50 AM

## 2024-05-15 ENCOUNTER — CLINICAL SUPPORT (OUTPATIENT)
Dept: BARIATRICS | Facility: CLINIC | Age: 69
End: 2024-05-15

## 2024-05-15 DIAGNOSIS — R63.5 ABNORMAL WEIGHT GAIN: Primary | ICD-10-CM

## 2024-05-15 PROCEDURE — RECHECK

## 2024-05-15 NOTE — PROGRESS NOTES
"  Weight Management Medical Nutrition Assessment  Wilmer was here today for his monthly follow-up in the [facility]. Today he weighs 281.2 lbs lbs giving him a loss of 4  lbs since his last visit.    He reports that he is sleeping better and recently is in a smaller pants size. He is no longer skipping meals, and has been eating smaller meals/snacks more frequently.  He has increased his water intake and has cut out juice.   He is continues to address a wound on his foot, and is not able to exercise per his wound specialist but is reports that it is improving.   He is able to do yoga and stretching at this time.  He is looking at starting a yoga class.            Patient seen by Medical Provider in past 6 months:  yes  Requested to schedule appointment with Medical Provider: No        Anthropometric Measurements  Start Weight (#): 300  Current Weight (#): 281.2   TBW % Change from start weight:6%  Ideal Body Weight (#):184  Goal Weight (#): to lose 75 lbs  Highest: 340   Lowest: 220 (\"years ago\")     Weight Loss History  Previous weight loss attempts: Commercial Programs (Weight Watchers, Donna Yash, etc.)  FAD Diets (Cabbage soup, Grapefruit, Cleanse, etc.)  Self Created Diets (Portion Control, Healthy Food Choices, etc.)     Food and Nutrition Related History     Food Recall  Breakfast:egg whites                Snack:bar  Lunch:nuts, yogurt or shake  Snack:  Dinner: vegetables, 6- 8 oz porotein  Snack:         Beverages: water  Volume of beverage intake: 32 oz     Weekends: Same  Cravings: juice  Trouble area of day:mid day     Frequency of Eating out: irregularly  Food restrictions:none  Cooking: self   Food Shopping: self     Physical Activity Intake  Activity:none  Frequency: none  Physical limitations/barriers to exercise: foot wound     Estimated Needs     [employer] Needs: BMR : 2170   1-2# loss weekly sedentary:  1604 - 2104             1-2# loss weekly lightly active:1983- " 4163  Maintenance calories for sedentary activity level: 2604  Protein:100 - 125      (1.2-1.5g/kg IBW)  Fluid: 98     (35mL/kg IBW)     Nutrition Diagnosis  Yes;    Overweight/obesity  related to Excess energy intake as evidenced by  BMI more than normative standard for age and sex (obesity-grade III 40+)     Nutrition Intervention     Nutrition Prescription  Calories:1600 - 1800  Protein:100 - 125  Fluid:98        Nutrition Education:    Healthy Core Manual  Calorie controlled menu  Lean protein food choices  Healthy snack options  Food journaling tips        Nutrition Counseling:  Strategies: meal planning, portion sizes, healthy snack choices, hydration, fiber intake, protein intake, exercise, food journal        Monitoring and Evaluation:  Evaluation criteria:  Energy Intake  Meet protein needs  Maintain adequate hydration  Monitor weekly weight  Meal planning/preparation  Food journal   Decreased portions at mealtimes and snacks  Physical activity      Barriers to learning:none  Readiness to change: Action:  (Changing behavior)  Comprehension: good  Expected Compliance: good

## 2024-05-16 ENCOUNTER — OFFICE VISIT (OUTPATIENT)
Dept: WOUND CARE | Facility: CLINIC | Age: 69
End: 2024-05-16
Payer: MEDICARE

## 2024-05-16 VITALS
SYSTOLIC BLOOD PRESSURE: 150 MMHG | TEMPERATURE: 98.1 F | HEART RATE: 69 BPM | RESPIRATION RATE: 18 BRPM | DIASTOLIC BLOOD PRESSURE: 74 MMHG

## 2024-05-16 DIAGNOSIS — L97.512 DIABETIC ULCER OF OTHER PART OF RIGHT FOOT ASSOCIATED WITH DIABETES MELLITUS DUE TO UNDERLYING CONDITION, WITH FAT LAYER EXPOSED (HCC): ICD-10-CM

## 2024-05-16 DIAGNOSIS — S81.801A LEG WOUND, RIGHT, INITIAL ENCOUNTER: Primary | ICD-10-CM

## 2024-05-16 DIAGNOSIS — E08.621 DIABETIC ULCER OF OTHER PART OF RIGHT FOOT ASSOCIATED WITH DIABETES MELLITUS DUE TO UNDERLYING CONDITION, WITH FAT LAYER EXPOSED (HCC): ICD-10-CM

## 2024-05-16 PROCEDURE — 11045 DBRDMT SUBQ TISS EACH ADDL: CPT | Performed by: PODIATRIST

## 2024-05-16 PROCEDURE — 11042 DBRDMT SUBQ TIS 1ST 20SQCM/<: CPT | Performed by: PODIATRIST

## 2024-05-16 PROCEDURE — 99213 OFFICE O/P EST LOW 20 MIN: CPT | Performed by: PODIATRIST

## 2024-05-16 RX ORDER — LIDOCAINE 40 MG/G
CREAM TOPICAL ONCE
Status: COMPLETED | OUTPATIENT
Start: 2024-05-16 | End: 2024-05-16

## 2024-05-16 RX ADMIN — LIDOCAINE: 40 CREAM TOPICAL at 14:06

## 2024-05-16 NOTE — PATIENT INSTRUCTIONS
Orders Placed This Encounter   Procedures    Wound cleansing and dressings Diabetic Ulcer Right;Plantar;Posterior Foot     Right Foot Wound    Wash your hands with soap and water.  Remove old dressing, discard into plastic bag and place in trash.  Cleanse the wound with Chlorhexidine prior to applying a clean dressing. Do not use tissue or cotton balls. Do not scrub the wound. Pat dry using gauze.    Shower: Yes, if able to cover with Cast Cover     Apply Skin Prep to skin surrounding wound  Apply Endoform AM to the open wound.    Cover with Gauze and ABD.   Secure with Julisa and Tape.     Change dressing Every Other Day.     Standing Status:   Future     Standing Expiration Date:   5/23/2024    Wound cleansing and dressings Traumatic Right Pretibial     Right Leg Wound    Wash your hands with soap and water.  Remove old dressing, discard into plastic bag and place in trash.  Cleanse the wound with Chlorhexidine prior to applying a clean dressing. Do not use tissue or cotton balls. Do not scrub the wound. Pat dry using gauze.    Shower: Yes, if able to cover with Cast Cover       Apply Silver Alginate to the open wound.    Cover with Gauze and ABD  Secure with Julisa and Tape    Change dressing every other day.     Standing Status:   Future     Standing Expiration Date:   5/23/2024

## 2024-05-16 NOTE — PROGRESS NOTES
Patient ID: Augustus Coffman is a 68 y.o. male Date of Birth 1955       Chief Complaint   Patient presents with    Follow Up Wound Care Visit     Right Foot Wound       Allergies:  Lisinopril    Diagnosis:  1. Leg wound, right, initial encounter  -     lidocaine (LMX) 4 % cream  -     Wound cleansing and dressings Diabetic Ulcer Right;Plantar;Posterior Foot; Future  -     Wound cleansing and dressings Traumatic Right Pretibial; Future  2. Diabetic ulcer of other part of right foot associated with diabetes mellitus due to underlying condition, with fat layer exposed (HCC)     Diagnosis ICD-10-CM Associated Orders   1. Leg wound, right, initial encounter  S81.801A lidocaine (LMX) 4 % cream     Wound cleansing and dressings Diabetic Ulcer Right;Plantar;Posterior Foot     Wound cleansing and dressings Traumatic Right Pretibial      2. Diabetic ulcer of other part of right foot associated with diabetes mellitus due to underlying condition, with fat layer exposed (HCC)  E08.621     L97.512            Assessment & Plan:  See wound orders.    Leg wound, right, initial-The patient hit his leg on the truck trailer.  I suspect the patient is doing too much and not off the foot as there is some new callus formation which has not been present in recent weeks.  The patient must rest and elevate the leg to keep fluid from exiting the ulcer and causing infection.  Avoid salt and wear the spandi- compression.  Wound care instructions provided.  Diabetic ulcer right foot to fat/rosa 2 ulcer-This wound has steadily gotten smaller weekly with graffix prime applications.  Unfortunately, this week the wound got wet and he likely walked on the foot.  I recommend a skin sub break with surgical scrub and then debridement of the wound.  Graffix could be resumed next week if the ulcer does not have callus and maceration and also gets smaller.  For now, I recommend endoform application to the wound.    Subjective:   This is a 68  yo male with ulcer to the right foot.  The patient notes he had on his shower cover and the dressing got wet.  He was able to replace the outside but not the inside.  In addition, the patient banged his leg on his trailer hitch.          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HCC)    Hypertension    Hypercholesteremia    Hammer toe of right foot    Stasis dermatitis of both legs    Diabetic peripheral neuropathy (HCC)    Gout    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HCC)    Obesity with body mass index 30 or greater    Type 2 diabetes mellitus (HCC)    Retroperitoneal hematoma    Mesenteric lymphadenopathy    Acute blood loss anemia    Heart murmur    GI bleed    Pleural effusion    Chronic venous hypertension (idiopathic) with ulcer of right lower extremity (HCC)    Rash    Stage 2 chronic kidney disease    Hypertensive kidney disease with stage 2 chronic kidney disease    Other proteinuria    Obesity, morbid (HCC)    Follicular lymphoma grade I of lymph nodes of multiple sites (HCC)    Vitamin D deficiency    Stage 3a chronic kidney disease (HCC)    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 (HCC)    Ectatic thoracic aorta (HCC)     Past Medical History:   Diagnosis Date    Chronic kidney disease     Diabetes mellitus (HCC)     Follicular lymphoma (HCC)     High cholesterol     Hypertension      Past Surgical History:   Procedure Laterality Date    BUNIONECTOMY Right 11/24/2020    Procedure: RIGHT HAV CORRECTION,;  Surgeon: Niranjan Child DPM;  Location: [facility];  Service: Podiatry    COLONOSCOPY      INCISION AND DRAINAGE OF WOUND Right 10/05/2016    Procedure: INCISION AND DRAINAGE (I&D) EXTREMITY;  Surgeon: Niranjan Child DPM;  Location: [facility];  Service:     IR BIOPSY LYMPH NODE  07/08/2021    IR EMBOLIZATION (SPECIFY VESSEL OR SITE)  07/08/2021    IR PORT PLACEMENT  9/1/2022    PILONIDAL CYST  EXCISION      NM CORRECTION HAMMERTOE Right 02/19/2019    Procedure: THIRD HAMMER TOE CORRECTION;  Surgeon: Niranjan Child DPM;  Location: [facility];  Service: Podiatry    NM RMVL MATEO CTR VAD W/SUBQ PORT/ CTR/PRPH INSJ N/A 3/14/2023    Procedure: REMOVAL VENOUS PORT (PORT-A-CATH)IR;  Surgeon: Avelino Vázquez DO;  [facility];  Service: Interventional Radiology    TOE OSTEOTOMY Right 03/14/2017    Procedure: HAMMERTOE CORRECTION R 2 ;  Surgeon: Niranjan Child DPM;  [facility];  Service:     TOE OSTEOTOMY Left 11/24/2020    Procedure: LEFT HT CORRECTION TOE;  Surgeon: Niranjan Child DPM;  Location: [facility];  Service: Podiatry    TONSILLECTOMY  1963     Social History     Socioeconomic History    Marital status: /Civil Union     Spouse name: None    Number of children: None    Years of education: None    Highest education level: None   Occupational History    None   Tobacco Use    Smoking status: Never    Smokeless tobacco: Never    Tobacco comments:     Never smoked but exposed to second hand smoke from birth until 1980's   Vaping Use    Vaping status: Never Used   Substance and Sexual Activity    Alcohol use: No    Drug use: No    Sexual activity: Not Currently     Partners: Female     Birth control/protection: Abstinence   Other Topics Concern    None   Social History Narrative    None     Social Determinants of Health     Financial Resource Strain: Not on file   Food Insecurity: No Food Insecurity (5/11/2024)    Hunger Vital Sign     Worried About Running Out of Food in the Last Year: Never true     Ran Out of Food in the Last Year: Never true   Transportation Needs: No Transportation Needs (5/11/2024)    PRAPARE - Transportation     Lack of Transportation (Medical): No     Lack of Transportation (Non-Medical): No   Physical Activity: Not on file   Stress: Not on file   Social Connections: Not on file   Intimate Partner Violence: Not on file   Housing Stability: Low Risk   (5/11/2024)    Housing Stability Vital Sign     Unable to Pay for Housing in the Last Year: No     Number of Places Lived in the Last Year: 1     Unstable Housing in the Last Year: No        Current Outpatient Medications:     allopurinol (ZYLOPRIM) 300 mg tablet, Take 1 tablet (300 mg total) by mouth daily, Disp: 90 tablet, Rfl: 1    amLODIPine (NORVASC) 10 mg tablet, Take 1 tablet (10 mg total) by mouth daily, Disp: 90 tablet, Rfl: 1    aspirin 81 mg chewable tablet, Chew 81 mg daily, Disp: , Rfl:     Cholecalciferol 100 MCG (4000 UT) CAPS, Take 1 capsule (4,000 Units total) by mouth in the morning, Disp: , Rfl:     Empagliflozin (Jardiance) 25 MG TABS, Take 1 tablet (25 mg total) by mouth every morning, Disp: 90 tablet, Rfl: 2    hydroCHLOROthiazide 25 mg tablet, Take 1 tablet (25 mg total) by mouth daily, Disp: 90 tablet, Rfl: 1    losartan (COZAAR) 100 MG tablet, Take 1 tablet (100 mg total) by mouth daily, Disp: 90 tablet, Rfl: 1    metFORMIN (GLUCOPHAGE) 500 mg tablet, Take 2 tablets (1,000 mg total) by mouth 2 (two) times a day with meals, Disp: 180 tablet, Rfl: 1    metoprolol succinate (TOPROL-XL) 100 mg 24 hr tablet, Take 1 tablet (100 mg total) by mouth every evening, Disp: 90 tablet, Rfl: 1    Multiple Vitamins-Minerals (Centrum Silver 50+Men) TABS, , Disp: , Rfl:     rosuvastatin (CRESTOR) 40 MG tablet, Take 1 tablet (40 mg total) by mouth every evening, Disp: 90 tablet, Rfl: 1  No current facility-administered medications for this visit.  Family History   Problem Relation Age of Onset    Cancer Father         Leukemia      Review of Systems   Constitutional:  Negative for chills and fever.         Objective:  /74   Pulse 69   Temp 98.1 °F (36.7 °C)   Resp 18     Physical Exam  Neurological:      Mental Status: He is alert.             Wound 09/01/22 Incision Chest Anterior;Right (Active)       Wound 03/14/23 Chest Right (Active)       Wound 09/07/23 Diabetic Ulcer Foot Right;Plantar;Posterior  (Active)   Enter Singh score: Singh Grade 1: Partial or full-thickness ulcer (superficial) 05/16/24 1409   Wound Image   05/16/24 1427   Wound Description Pink;Epithelialization;White 05/16/24 1409   Delisa-wound Assessment Maceration;Fragile 05/16/24 1409   Wound Length (cm) 0.9 cm 05/16/24 1409   Wound Width (cm) 0.9 cm 05/16/24 1409   Wound Depth (cm) 0.1 cm 05/16/24 1409   Wound Surface Area (cm^2) 0.81 cm^2 05/16/24 1409   Wound Volume (cm^3) 0.081 cm^3 05/16/24 1409   Calculated Wound Volume (cm^3) 0.08 cm^3 05/16/24 1409   Change in Wound Size % 92 05/16/24 1409   Tunneling 1 in depth located at 1-12 o'clock 10/05/23 1348   Number of underminings 1 02/15/24 1344   Undermining 1 0.4 02/15/24 1344   Undermining 1 is depth extending from 9-12 02/15/24 1344   Drainage Amount Moderate 05/16/24 1409   Drainage Description Serosanguineous 05/16/24 1409   Non-staged Wound Description Full thickness 05/16/24 1409   Dressing Status Intact 05/16/24 1409       Wound 05/16/24 Traumatic Pretibial Right (Active)   Wound Image   05/16/24 1409   Wound Description Pink;Epithelialization;Yellow 05/16/24 1409   Delisa-wound Assessment Fragile 05/16/24 1409   Wound Length (cm) 2.7 cm 05/16/24 1409   Wound Width (cm) 8.1 cm 05/16/24 1409   Wound Depth (cm) 0.1 cm 05/16/24 1409   Wound Surface Area (cm^2) 21.87 cm^2 05/16/24 1409   Wound Volume (cm^3) 2.187 cm^3 05/16/24 1409   Calculated Wound Volume (cm^3) 2.19 cm^3 05/16/24 1409   Drainage Amount Moderate 05/16/24 1409   Drainage Description Serosanguineous 05/16/24 1409   Non-staged Wound Description Full thickness 05/16/24 1409   Dressing Status Intact 05/16/24 1409                         Debridement   Wound 09/07/23 Diabetic Ulcer Foot Right;Plantar;Posterior    Universal Protocol:  Consent: Verbal consent obtained.  Consent given by: patient  Patient understanding: patient states understanding of the procedure being performed  Patient identity confirmed: verbally with  patient    Debridement Details  Performed by: physician  Debridement type: surgical  Level of debridement: subcutaneous tissue  Pain control: lidocaine 1%      Post-debridement measurements  Length (cm): 0.9  Width (cm): 0.9  Depth (cm): 0.1  Percent debrided: 100%  Surface Area (cm^2): 0.81  Area Debrided (cm^2): 0.81  Volume (cm^3): 0.08    Tissue and other material debrided: subcutaneous tissue  Devitalized tissue debrided: slough  Instrument(s) utilized: curette  Bleeding: medium  Hemostasis obtained with: pressure and silver nitrate  Procedural pain (0-10): insensate  Post-procedural pain: insensate   Response to treatment: procedure was tolerated well                 Wound Instructions:  Orders Placed This Encounter   Procedures    Wound cleansing and dressings Diabetic Ulcer Right;Plantar;Posterior Foot     Right Foot Wound    Wash your hands with soap and water.  Remove old dressing, discard into plastic bag and place in trash.  Cleanse the wound with Chlorhexidine prior to applying a clean dressing. Do not use tissue or cotton balls. Do not scrub the wound. Pat dry using gauze.    Shower: Yes, if able to cover with Cast Cover     Apply Skin Prep to skin surrounding wound  Apply Endoform AM to the open wound.    Cover with Gauze and ABD.   Secure with Julisa and Tape.     Change dressing Every Other Day.     Standing Status:   Future     Standing Expiration Date:   5/23/2024    Wound cleansing and dressings Traumatic Right Pretibial     Right Leg Wound    Wash your hands with soap and water.  Remove old dressing, discard into plastic bag and place in trash.  Cleanse the wound with Chlorhexidine prior to applying a clean dressing. Do not use tissue or cotton balls. Do not scrub the wound. Pat dry using gauze.    Shower: Yes, if able to cover with Cast Cover       Apply Silver Alginate to the open wound.    Cover with Gauze and ABD  Secure with Julisa and Tape    Change dressing every other day.     Standing  "Status:   Future     Standing Expiration Date:   5/23/2024    Debridement     This order was created via procedure documentation         Susie Luu DPM      Portions of the record may have been created with voice recognition software. Occasional wrong word or \"sound a like\" substitutions may have occurred due to the inherent limitations of voice recognition software. Read the chart carefully and recognize, using context, where substitutions have occurred.     "

## 2024-05-22 ENCOUNTER — CLINICAL SUPPORT (OUTPATIENT)
Dept: BARIATRICS | Facility: CLINIC | Age: 69
End: 2024-05-22

## 2024-05-22 VITALS — BODY MASS INDEX: 37 KG/M2 | WEIGHT: 279.2 LBS | HEIGHT: 73 IN

## 2024-05-22 DIAGNOSIS — R63.5 ABNORMAL WEIGHT GAIN: Primary | ICD-10-CM

## 2024-05-22 PROCEDURE — RECHECK

## 2024-05-23 ENCOUNTER — OFFICE VISIT (OUTPATIENT)
Dept: WOUND CARE | Facility: CLINIC | Age: 69
End: 2024-05-23
Payer: MEDICARE

## 2024-05-23 VITALS
TEMPERATURE: 98.4 F | HEART RATE: 64 BPM | DIASTOLIC BLOOD PRESSURE: 67 MMHG | RESPIRATION RATE: 20 BRPM | SYSTOLIC BLOOD PRESSURE: 129 MMHG

## 2024-05-23 DIAGNOSIS — S81.801A TRAUMATIC OPEN WOUND OF LOWER LEG, RIGHT, INITIAL ENCOUNTER: ICD-10-CM

## 2024-05-23 DIAGNOSIS — E08.621 DIABETIC ULCER OF OTHER PART OF RIGHT FOOT ASSOCIATED WITH DIABETES MELLITUS DUE TO UNDERLYING CONDITION, WITH FAT LAYER EXPOSED (HCC): Primary | ICD-10-CM

## 2024-05-23 DIAGNOSIS — L97.512 DIABETIC ULCER OF OTHER PART OF RIGHT FOOT ASSOCIATED WITH DIABETES MELLITUS DUE TO UNDERLYING CONDITION, WITH FAT LAYER EXPOSED (HCC): Primary | ICD-10-CM

## 2024-05-23 PROCEDURE — 11045 DBRDMT SUBQ TISS EACH ADDL: CPT | Performed by: ORTHOPAEDIC SURGERY

## 2024-05-23 PROCEDURE — 11042 DBRDMT SUBQ TIS 1ST 20SQCM/<: CPT | Performed by: ORTHOPAEDIC SURGERY

## 2024-05-23 PROCEDURE — 99214 OFFICE O/P EST MOD 30 MIN: CPT | Performed by: ORTHOPAEDIC SURGERY

## 2024-05-23 RX ORDER — LIDOCAINE 40 MG/G
CREAM TOPICAL ONCE
Status: COMPLETED | OUTPATIENT
Start: 2024-05-23 | End: 2024-05-23

## 2024-05-23 RX ADMIN — LIDOCAINE: 40 CREAM TOPICAL at 13:48

## 2024-05-23 NOTE — PATIENT INSTRUCTIONS
Orders Placed This Encounter   Procedures    Wound cleansing and dressings Diabetic Ulcer Right;Plantar;Posterior Foot     Right Foot Wound     Wash your hands with soap and water. Remove old dressing, discard into plastic bag and place in trash. Cleanse the wound with 1/4 Strength Dakins Moistened Gauze for 10 minutes prior to applying a clean dressing. Do not use tissue or cotton balls. Do not scrub the wound. Pat dry using gauze.     Shower: Yes, if able to cover with Cast Cover.    Apply Skin Prep to skin surrounding wound.  Apply Aquacel AG Ribbon to the open wound.   Gently Pack Aquacel AG Ribbon into tunnel of foot wound. Cut Ribbon in half for this part.   Cover with Gauze and ABD.   Secure with Julisa and Tape.     Change dressing Every Day     Standing Status:   Future     Standing Expiration Date:   5/30/2024    Wound cleansing and dressings Traumatic Right Pretibial     Right Leg Wound     Wash your hands with soap and water. Remove old dressing, discard into plastic bag and place in trash. Cleanse the wound with 1/4 Strength Dakins Moistened Gauze for 10 minutes prior to applying a clean dressing. Do not use tissue or cotton balls. Do not scrub the wound. Pat dry using gauze.     Shower: Yes, if able to cover with Cast Cover     Apply Dermagran to the open wound.   Cover with Gauze and ABD.   Secure with Julisa and Tape     Change dressing every other day     Standing Status:   Future     Standing Expiration Date:   5/30/2024

## 2024-05-23 NOTE — PROGRESS NOTES
Wound Procedure Treatment Traumatic Right Pretibial    Performed by: Maryana Bocanegra RN  Authorized by: Teresa Palma PA-C    Associated wounds:   Wound 05/16/24 Traumatic Pretibial Right  Wound cleansed with:  Dakin's 0.125%  Applied primary dressing:  Silver  Applied secondary dressing:  ABD and Gauze  Dressing secured with:  Julisa and Tape

## 2024-05-23 NOTE — PROGRESS NOTES
Patient ID: Augustus Coffman is a 68 y.o. male Date of Birth 1955       Chief Complaint   Patient presents with    Follow Up Wound Care Visit     RLE Wounds       Allergies:  Lisinopril    Diagnosis:   Diagnosis ICD-10-CM Associated Orders   1. Diabetic ulcer of other part of right foot associated with diabetes mellitus due to underlying condition, with fat layer exposed ([facility])  E08.621 lidocaine (LMX) 4 % cream    L97.512 Wound cleansing and dressings Diabetic Ulcer Right;Plantar;Posterior Foot     Wound cleansing and dressings Traumatic Right Pretibial     Wound Procedure Treatment Diabetic Ulcer Right;Plantar;Posterior Foot     Debridement      2. Traumatic open wound of lower leg, right, initial encounter  S81.801A lidocaine (LMX) 4 % cream     Wound cleansing and dressings Diabetic Ulcer Right;Plantar;Posterior Foot     Wound cleansing and dressings Traumatic Right Pretibial     Wound Procedure Treatment Traumatic Right Pretibial     Debridement           Assessment and Plan :  Initial Evaluation of Singh Grade 2 Right diabetic foot ulcer progressively worsening and increasing in size with adherent slough and boggy wound bed. Tunnel with 1.1cm depth at 9 o'clock appreciated. Open traumatic wound slowly healing. No signs of infection today.  Debrided as below  Wound management with Aquacel Ag ribbon light packing into tunnel on R DFU and Ag alginate dressing on wound bed.  Dermagran to RLE open wound. See wound orders below   No harsh cleansers such as alcohol, peroxide, or antibacterial soap, do not we nor submerge in water  Counseled on importance of frequent elevation of leg decreasing activity level for wound healing.  Proper offloading with off-loading felt and bulky dressing around wound to help off load. Pt was unable to tolerate the [employer]oWedge Forefoot Offloading shoe.  .  Followup with Podiatry in 1 week(s) or call sooner with questions or concerns or any signs of infection such as  redness, swelling, increased/purulent drainage, fever, chills, increased severe pain.     Subjective:   Pt is a 68 y.o. 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  Dr. Luu was applying Grafix skin substitute to wound bed but it was held it last week due to sub-optimal wound bed.  Pt admits he wet the wound while showering with cast cover. Pt has been applying silver alginate to RLE wound bed but states dressing has been adhering to wound bed.  Pt denies any sob, fatigue, N/V, CP, fever or chills.          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HCC)    Hypertension    Hypercholesteremia    Hammer toe of right foot    Stasis dermatitis of both legs    Diabetic peripheral neuropathy (HCC)    Gout    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HCC)    Obesity with body mass index 30 or greater    Type 2 diabetes mellitus (HCC)    Retroperitoneal hematoma    Mesenteric lymphadenopathy    Acute blood loss anemia    Heart murmur    GI bleed    Pleural effusion    Chronic venous hypertension (idiopathic) with ulcer of right lower extremity (HCC)    Rash    Stage 2 chronic kidney disease    Hypertensive kidney disease with stage 2 chronic kidney disease    Other proteinuria    Obesity, morbid (HCC)    Follicular lymphoma grade I of lymph nodes of multiple sites (HCC)    Vitamin D deficiency    Stage 3a chronic kidney disease (HCC)    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 (HCC)    Ectatic thoracic aorta (HCC)     Past Medical History:   Diagnosis Date    Chronic kidney disease     Diabetes mellitus (HCC)     Follicular lymphoma (HCC)     High cholesterol     Hypertension      Past Surgical History:   Procedure Laterality Date    BUNIONECTOMY Right 11/24/2020    Procedure: RIGHT HAV CORRECTION,;  Surgeon: Niranjan Child DPM;  Location: [facility];  Service: Podiatry    COLONOSCOPY      INCISION AND DRAINAGE OF WOUND Right 10/05/2016    Procedure: INCISION AND DRAINAGE (I&D) EXTREMITY;  Surgeon: Niranjan Child DPM;  Location: [facility];  Service:     IR BIOPSY LYMPH NODE  07/08/2021    IR EMBOLIZATION (SPECIFY VESSEL OR SITE)  07/08/2021    IR PORT PLACEMENT  9/1/2022    PILONIDAL CYST EXCISION      NM CORRECTION HAMMERTOE Right 02/19/2019    Procedure: THIRD HAMMER TOE CORRECTION;  Surgeon: Niranjan Child DPM;  Location[facility];  Service: Podiatry    NM RMVL MATEO CTR VAD W/SUBQ PORT/ CTR/PRPH INSJ N/A 3/14/2023    Procedure: REMOVAL VENOUS PORT (PORT-A-CATH)IR;  Surgeon: Avelino Vázquez DO;  [facility];  Service: Interventional Radiology    TOE OSTEOTOMY Right 03/14/2017    Procedure: HAMMERTOE CORRECTION R 2 ;  Surgeon: Niranjan Child DPM;  [facility];  Service:     TOE OSTEOTOMY Left 11/24/2020    Procedure: LEFT HT CORRECTION TOE;  Surgeon: Niranjan Child DPM;  [facility];  Service: Podiatry    TONSILLECTOMY  1963     Family History   Problem Relation Age of Onset    Cancer Father         Leukemia     Social History     Socioeconomic History    Marital status: /Civil Union     Spouse name: None    Number of children: None    Years of education: None    Highest education level: None   Occupational History    None   Tobacco Use    Smoking status: Never    Smokeless tobacco: Never    Tobacco comments:     Never smoked but exposed to second hand smoke from birth until 1980's   Vaping Use    Vaping status: Never Used   Substance and Sexual Activity    Alcohol use: No    Drug use: No    Sexual activity: Not Currently     Partners: Female     Birth control/protection: Abstinence   Other Topics Concern    None   Social History Narrative    None     Social Determinants of Health     Financial Resource Strain: Not on file   Food Insecurity: No Food Insecurity (5/11/2024)    Hunger Vital Sign     Worried About  Running Out of Food in the Last Year: Never true     Ran Out of Food in the Last Year: Never true   Transportation Needs: No Transportation Needs (5/11/2024)    PRAPARE - Transportation     Lack of Transportation (Medical): No     Lack of Transportation (Non-Medical): No   Physical Activity: Not on file   Stress: Not on file   Social Connections: Not on file   Intimate Partner Violence: Not on file   Housing Stability: Low Risk  (5/11/2024)    Housing Stability Vital Sign     Unable to Pay for Housing in the Last Year: No     Number of Places Lived in the Last Year: 1     Unstable Housing in the Last Year: No       Current Outpatient Medications:     allopurinol (ZYLOPRIM) 300 mg tablet, Take 1 tablet (300 mg total) by mouth daily, Disp: 90 tablet, Rfl: 1    amLODIPine (NORVASC) 10 mg tablet, Take 1 tablet (10 mg total) by mouth daily, Disp: 90 tablet, Rfl: 1    aspirin 81 mg chewable tablet, Chew 81 mg daily, Disp: , Rfl:     Cholecalciferol 100 MCG (4000 UT) CAPS, Take 1 capsule (4,000 Units total) by mouth in the morning, Disp: , Rfl:     Empagliflozin (Jardiance) 25 MG TABS, Take 1 tablet (25 mg total) by mouth every morning, Disp: 90 tablet, Rfl: 2    hydroCHLOROthiazide 25 mg tablet, Take 1 tablet (25 mg total) by mouth daily, Disp: 90 tablet, Rfl: 1    losartan (COZAAR) 100 MG tablet, Take 1 tablet (100 mg total) by mouth daily, Disp: 90 tablet, Rfl: 1    metFORMIN (GLUCOPHAGE) 500 mg tablet, Take 2 tablets (1,000 mg total) by mouth 2 (two) times a day with meals, Disp: 180 tablet, Rfl: 1    metoprolol succinate (TOPROL-XL) 100 mg 24 hr tablet, Take 1 tablet (100 mg total) by mouth every evening, Disp: 90 tablet, Rfl: 1    Multiple Vitamins-Minerals (Centrum Silver 50+Men) TABS, , Disp: , Rfl:     rosuvastatin (CRESTOR) 40 MG tablet, Take 1 tablet (40 mg total) by mouth every evening, Disp: 90 tablet, Rfl: 1  No current facility-administered medications for this visit.    Review of Systems   Constitutional:   Negative for appetite change, chills, fatigue, fever and unexpected weight change.   HENT:  Negative for congestion, hearing loss and postnasal drip.    Respiratory:  Negative for cough and shortness of breath.    Cardiovascular:  Negative for leg swelling.   Musculoskeletal:  Positive for gait problem.   Skin:  Positive for wound (RLE and R foot). Negative for rash.   Neurological:  Negative for numbness.   Hematological:  Does not bruise/bleed easily.   Psychiatric/Behavioral: Negative.           Objective:  /67   Pulse 64   Temp 98.4 °F (36.9 °C)   Resp 20   Pain Score: 0-No pain     Physical Exam  Vitals reviewed.   Constitutional:       General: He is not in acute distress.     Appearance: Normal appearance. He is well-developed. He is obese.   HENT:      Head: Normocephalic and atraumatic.   Cardiovascular:      Rate and Rhythm: Normal rate.      Pulses:           Dorsalis pedis pulses are 2+ on the right side.        Posterior tibial pulses are 2+ on the right side.   Pulmonary:      Effort: Pulmonary effort is normal.   Musculoskeletal:      Right lower leg: No edema.      Left lower leg: No edema.        Feet:    Feet:      Right foot:      Skin integrity: Ulcer present.      Comments: Singh grade 2 with wet boggy wound bed yellow slough on wound bed and callus periwound. Post debridement with 1.1cm tunnel at 9 o'clock.   Skin:     General: Skin is warm and dry.      Findings: Wound (Rt foot and RLE) present.             Comments: Yellow slough on pink granulated wound bed.  See wound assessment   Neurological:      General: No focal deficit present.      Mental Status: He is alert and oriented to person, place, and time.      Gait: Gait abnormal.   Psychiatric:         Mood and Affect: Mood and affect normal.         Behavior: Behavior normal. Behavior is cooperative.                Wound 09/07/23 Diabetic Ulcer Foot Right;Plantar;Posterior (Active)   Enter Singh score: Singh Grade 2: Partial  "or full-thickness ulcer (superficial) 05/23/24 1341   Wound Description Pink;Epithelialization;White 05/23/24 1341   Delisa-wound Assessment Maceration;Fragile 05/23/24 1341   Wound Length (cm) 0.5 cm 05/23/24 1341   Wound Width (cm) 0.5 cm 05/23/24 1341   Wound Depth (cm) 0.1 cm 05/23/24 1341   Wound Surface Area (cm^2) 0.25 cm^2 05/23/24 1341   Wound Volume (cm^3) 0.025 cm^3 05/23/24 1341   Calculated Wound Volume (cm^3) 0.03 cm^3 05/23/24 1341   Change in Wound Size % 97 05/23/24 1341   Tunneling 1 in depth located at 1-12 o'clock 10/05/23 1348   Number of underminings 1 02/15/24 1344   Undermining 1 0.4 02/15/24 1344   Undermining 1 is depth extending from 9-12 02/15/24 1344   Drainage Amount Moderate 05/23/24 1341   Drainage Description Serosanguineous 05/23/24 1341   Non-staged Wound Description Full thickness 05/23/24 1341   Dressing Status Intact 05/23/24 1341       Wound 05/16/24 Traumatic Pretibial Right (Active)   Wound Description Beefy red;Pink 05/23/24 1342   Delisa-wound Assessment Pink;Fragile 05/23/24 1342   Wound Length (cm) 2.6 cm 05/23/24 1342   Wound Width (cm) 8 cm 05/23/24 1342   Wound Depth (cm) 0.1 cm 05/23/24 1342   Wound Surface Area (cm^2) 20.8 cm^2 05/23/24 1342   Wound Volume (cm^3) 2.08 cm^3 05/23/24 1342   Calculated Wound Volume (cm^3) 2.08 cm^3 05/23/24 1342   Change in Wound Size % 5.02 05/23/24 1342   Drainage Amount Moderate 05/23/24 1342   Drainage Description Serosanguineous;Bloody 05/23/24 1342   Non-staged Wound Description Full thickness 05/23/24 1342   Dressing Status Intact 05/23/24 1342       Debridement   Wound 09/07/23 Diabetic Ulcer Foot Right;Plantar;Posterior    Universal Protocol:  Consent: Verbal consent obtained. Written consent obtained.  Risks and benefits: risks, benefits and alternatives were discussed  Consent given by: patient  Time out: Immediately prior to procedure a \"time out\" was called to verify the correct patient, procedure, equipment, support staff and " "site/side marked as required.  Patient understanding: patient states understanding of the procedure being performed  Patient identity confirmed: verbally with patient    Debridement Details  Performed by: PA  Debridement type: surgical  Level of debridement: subcutaneous tissue  Pain control: lidocaine 4%      Post-debridement measurements  Length (cm): 2.1  Width (cm): 2.9  Depth (cm): 0.3  Percent debrided: 100%  Surface Area (cm^2): 6.09  Area Debrided (cm^2): 6.09  Volume (cm^3): 1.83    Tissue and other material debrided: subcutaneous tissue  Devitalized tissue debrided: callus, exudate and slough  Instrument(s) utilized: curette, blade and forceps  Bleeding: small  Hemostasis obtained with: pressure  Procedural pain (0-10): 0  Post-procedural pain: 0   Response to treatment: procedure was tolerated well    Debridement   Wound 05/16/24 Traumatic Pretibial Right    Universal Protocol:  Consent: Verbal consent obtained. Written consent obtained.  Risks and benefits: risks, benefits and alternatives were discussed  Consent given by: patient  Time out: Immediately prior to procedure a \"time out\" was called to verify the correct patient, procedure, equipment, support staff and site/side marked as required.  Patient understanding: patient states understanding of the procedure being performed  Patient identity confirmed: verbally with patient    Debridement Details  Performed by: PA  Debridement type: surgical  Level of debridement: subcutaneous tissue  Pain control: lidocaine 4%      Post-debridement measurements  Length (cm): 2.6  Width (cm): 8  Depth (cm): 0.2  Percent debrided: 100%  Surface Area (cm^2): 20.8  Area Debrided (cm^2): 20.8  Volume (cm^3): 4.16    Tissue and other material debrided: subcutaneous tissue  Devitalized tissue debrided: biofilm, exudate and slough  Instrument(s) utilized: curette  Bleeding: small  Hemostasis obtained with: pressure  Procedural pain (0-10): 0  Post-procedural pain: 0 "   Response to treatment: procedure was tolerated well               Wound Instructions:  Orders Placed This Encounter   Procedures    Wound cleansing and dressings Diabetic Ulcer Right;Plantar;Posterior Foot     Right Foot Wound     Wash your hands with soap and water. Remove old dressing, discard into plastic bag and place in trash. Cleanse the wound with 1/4 Strength Dakins Moistened Gauze for 10 minutes prior to applying a clean dressing. Do not use tissue or cotton balls. Do not scrub the wound. Pat dry using gauze.     Shower: Yes, if able to cover with Cast Cover.    Apply Skin Prep to skin surrounding wound.  Apply Aquacel AG Ribbon to the open wound.   Gently Pack Aquacel AG Ribbon into tunnel of foot wound. Cut Ribbon in half for this part.   Cover with Gauze and ABD.   Secure with Julisa and Tape.     Change dressing Every Day     Standing Status:   Future     Standing Expiration Date:   5/30/2024    Wound cleansing and dressings Traumatic Right Pretibial     Right Leg Wound     Wash your hands with soap and water. Remove old dressing, discard into plastic bag and place in trash. Cleanse the wound with 1/4 Strength Dakins Moistened Gauze for 10 minutes prior to applying a clean dressing. Do not use tissue or cotton balls. Do not scrub the wound. Pat dry using gauze.     Shower: Yes, if able to cover with Cast Cover     Apply Dermagran to the open wound.   Cover with Gauze and ABD.   Secure with Julisa and Tape     Change dressing every other day     Standing Status:   Future     Standing Expiration Date:   5/30/2024    Wound Procedure Treatment Traumatic Right Pretibial     This order was created via procedure documentation    Wound Procedure Treatment Diabetic Ulcer Right;Plantar;Posterior Foot     This order was created via procedure documentation    Debridement     This order was created via procedure documentation    Debridement     This order was created via procedure documentation       Teresa Palma  "KASANDRA DOWLING      Portions of the record may have been created with voice recognition software. Occasional wrong word or \"sound alike\" substitutions may have occurred due to the inherent limitations of voice recognition software. Read the chart carefully and recognize, using context, where substitutions have occurred.    "

## 2024-05-23 NOTE — PROGRESS NOTES
Wound Procedure Treatment Diabetic Ulcer Right;Plantar;Posterior Foot    Performed by: Maryana Bocanegra RN  Authorized by: Teresa Palma PA-C    Associated wounds:   Wound 09/07/23 Diabetic Ulcer Foot Right;Plantar;Posterior  Wound cleansed with:  Dakin's 0.25%  Applied primary dressing:  Packing and Silver  Applied secondary dressing:  Gauze, ABD and Cast padding  Dressing secured with:  Julisa and Tape

## 2024-05-27 DIAGNOSIS — I12.9 HYPERTENSIVE KIDNEY DISEASE WITH STAGE 2 CHRONIC KIDNEY DISEASE: ICD-10-CM

## 2024-05-27 DIAGNOSIS — N18.30 TYPE 2 DIABETES MELLITUS WITH STAGE 3 CHRONIC KIDNEY DISEASE, UNSPECIFIED WHETHER LONG TERM INSULIN USE, UNSPECIFIED WHETHER STAGE 3A OR 3B CKD (HCC): ICD-10-CM

## 2024-05-27 DIAGNOSIS — E11.22 TYPE 2 DIABETES MELLITUS WITH STAGE 3 CHRONIC KIDNEY DISEASE, UNSPECIFIED WHETHER LONG TERM INSULIN USE, UNSPECIFIED WHETHER STAGE 3A OR 3B CKD (HCC): ICD-10-CM

## 2024-05-27 DIAGNOSIS — N18.2 HYPERTENSIVE KIDNEY DISEASE WITH STAGE 2 CHRONIC KIDNEY DISEASE: ICD-10-CM

## 2024-05-27 DIAGNOSIS — E13.9 DIABETES 1.5, MANAGED AS TYPE 2 (HCC): ICD-10-CM

## 2024-05-30 ENCOUNTER — OFFICE VISIT (OUTPATIENT)
Dept: WOUND CARE | Facility: CLINIC | Age: 69
End: 2024-05-30
Payer: MEDICARE

## 2024-05-30 VITALS
HEART RATE: 70 BPM | TEMPERATURE: 98.1 F | RESPIRATION RATE: 18 BRPM | DIASTOLIC BLOOD PRESSURE: 81 MMHG | SYSTOLIC BLOOD PRESSURE: 158 MMHG

## 2024-05-30 DIAGNOSIS — E08.621 DIABETIC ULCER OF OTHER PART OF RIGHT FOOT ASSOCIATED WITH DIABETES MELLITUS DUE TO UNDERLYING CONDITION, WITH FAT LAYER EXPOSED (HCC): Primary | ICD-10-CM

## 2024-05-30 DIAGNOSIS — L97.512 DIABETIC ULCER OF OTHER PART OF RIGHT FOOT ASSOCIATED WITH DIABETES MELLITUS DUE TO UNDERLYING CONDITION, WITH FAT LAYER EXPOSED (HCC): Primary | ICD-10-CM

## 2024-05-30 DIAGNOSIS — S81.801D TRAUMATIC OPEN WOUND OF LOWER LEG, RIGHT, SUBSEQUENT ENCOUNTER: ICD-10-CM

## 2024-05-30 PROCEDURE — 15275 SKIN SUB GRAFT FACE/NK/HF/G: CPT | Performed by: PODIATRIST

## 2024-05-30 PROCEDURE — 99213 OFFICE O/P EST LOW 20 MIN: CPT | Performed by: PODIATRIST

## 2024-05-30 RX ORDER — LIDOCAINE 40 MG/G
CREAM TOPICAL ONCE
Status: COMPLETED | OUTPATIENT
Start: 2024-05-30 | End: 2024-05-30

## 2024-05-30 RX ADMIN — LIDOCAINE: 40 CREAM TOPICAL at 13:33

## 2024-05-30 NOTE — PROGRESS NOTES
Wound Procedure Treatment Traumatic Right Pretibial    Performed by: Maryana Bocanegra RN  Authorized by: Susie Luu DPM    Associated wounds:   Wound 05/16/24 Traumatic Pretibial Right  Applied primary dressing:  Dermagran  Applied secondary dressing:  Gauze  Dressing secured with:  Julisa and Tape

## 2024-05-30 NOTE — PATIENT INSTRUCTIONS
Orders Placed This Encounter   Procedures    Wound cleansing and dressings Traumatic Right Pretibial     Right Leg Wound    Wash your hands with soap and water. Remove old dressing, discard into plastic bag and place in trash. Cleanse the wound with 1/4 Strength Dakins Moistened Gauze for 10 minutes prior to applying a clean dressing. Do not use tissue or cotton balls. Do not scrub the wound. Pat dry using gauze.     Shower: Yes, if able to cover with Cast Cover.     Apply Dermagran to the open wound.   Cover with Gauze and ABD.   Secure with Julisa and Tape.    Change dressing every other day.     Standing Status:   Future     Standing Expiration Date:   6/6/2024    Wound cleansing and dressings Diabetic Ulcer Right;Plantar;Posterior Foot     Right Foot Wound     Wound cleansing and dressings Diabetic Ulcer    If dressing becomes wet with drainage may change outer dressing only, do not remove the steristrip area only change bulky dressing (kerlix, cast padding, abd and julisa and tape to secure)     Grafix applied weekly at the wound center.     Standing Status:   Future     Standing Expiration Date:   6/6/2024

## 2024-05-30 NOTE — PROGRESS NOTES
Wound Procedure Treatment Diabetic Ulcer Right;Plantar;Posterior Foot    Performed by: Maryana Bocanegra RN  Authorized by: Susie Luu DPM    Associated wounds:   Wound 09/07/23 Diabetic Ulcer Foot Right;Plantar;Posterior  Applied to periwound:  Skin prep  Applied secondary dressing:  ABD, Gauze and Cast padding  Dressing secured with:  Coban, Julisa, Kerlix and Tape    Steri Strip   Grafix PL 16mm Applied  Secured with a Bulky Dressing.

## 2024-05-30 NOTE — PROGRESS NOTES
Patient ID: Augustus Coffman is a 68 y.o. male Date of Birth 1955       Chief Complaint   Patient presents with    Follow Up Wound Care Visit     RIGHT FOOT WOUND       Allergies:  Lisinopril    Diagnosis:  1. Diabetic ulcer of other part of right foot associated with diabetes mellitus due to underlying condition, with fat layer exposed (HCC)  -     lidocaine (LMX) 4 % cream  -     Wound cleansing and dressings Traumatic Right Pretibial; Future  -     Wound cleansing and dressings Diabetic Ulcer Right;Plantar;Posterior Foot; Future  -     Wound Procedure Treatment Diabetic Ulcer Right;Plantar;Posterior Foot  -     Skin Substitute  2. Traumatic open wound of lower leg, right, subsequent encounter  -     lidocaine (LMX) 4 % cream  -     Wound cleansing and dressings Traumatic Right Pretibial; Future  -     Wound cleansing and dressings Diabetic Ulcer Right;Plantar;Posterior Foot; Future  -     Wound Procedure Treatment Traumatic Right Pretibial     Diagnosis ICD-10-CM Associated Orders   1. Diabetic ulcer of other part of right foot associated with diabetes mellitus due to underlying condition, with fat layer exposed (HCC)  E08.621 lidocaine (LMX) 4 % cream    L97.512 Wound cleansing and dressings Traumatic Right Pretibial     Wound cleansing and dressings Diabetic Ulcer Right;Plantar;Posterior Foot     Wound Procedure Treatment Diabetic Ulcer Right;Plantar;Posterior Foot     Skin Substitute      2. Traumatic open wound of lower leg, right, subsequent encounter  S81.801D lidocaine (LMX) 4 % cream     Wound cleansing and dressings Traumatic Right Pretibial     Wound cleansing and dressings Diabetic Ulcer Right;Plantar;Posterior Foot     Wound Procedure Treatment Traumatic Right Pretibial           Assessment & Plan:  See wound orders.    Diabetic ulcer right foot to fat layer-rosa 2 ulcer.  The patient has stayed off the foot and the maceration has resolved.  In addition, the wound has gotten smaller with the  WB status.  Will resume [employer] application today.  See the report.  Patient can complete his applications if appropriate progress and conditions persist.  Leave dressing clean and dry.  Traumatic ulcer right leg-Patient is doing well with local wound care.  Continue as ordered.    Subjective:   The patient really stayed off his foot this week.  In the past two weeks he has lost an additional 2 pounds and BS remains low.  No pain in the foot.  He walks with crutches and the forefoot elevated with PWB on the heel only.        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HCC)    Hypertension    Hypercholesteremia    Hammer toe of right foot    Stasis dermatitis of both legs    Diabetic peripheral neuropathy (HCC)    Gout    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HCC)    Obesity with body mass index 30 or greater    Type 2 diabetes mellitus (HCC)    Retroperitoneal hematoma    Mesenteric lymphadenopathy    Acute blood loss anemia    Heart murmur    GI bleed    Pleural effusion    Chronic venous hypertension (idiopathic) with ulcer of right lower extremity (HCC)    Rash    Stage 2 chronic kidney disease    Hypertensive kidney disease with stage 2 chronic kidney disease    Other proteinuria    Obesity, morbid (HCC)    Follicular lymphoma grade I of lymph nodes of multiple sites (HCC)    Vitamin D deficiency    Stage 3a chronic kidney disease (HCC)    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 (HCC)    Ectatic thoracic aorta (HCC)     Past Medical History:   Diagnosis Date    Chronic kidney disease     Diabetes mellitus (HCC)     Follicular lymphoma (HCC)     High cholesterol     Hypertension      Past Surgical History:   Procedure Laterality Date    BUNIONECTOMY Right 11/24/2020    Procedure: RIGHT HAV CORRECTION,;  Surgeon: Niranjan Child DPM;  [facility];  Service: Podiatry    COLONOSCOPY       INCISION AND DRAINAGE OF WOUND Right 10/05/2016    Procedure: INCISION AND DRAINAGE (I&D) EXTREMITY;  Surgeon: Niranjan Child DPM;  [facility];  Service:     IR BIOPSY LYMPH NODE  07/08/2021    IR EMBOLIZATION (SPECIFY VESSEL OR SITE)  07/08/2021    IR PORT PLACEMENT  9/1/2022    PILONIDAL CYST EXCISION      RI CORRECTION HAMMERTOE Right 02/19/2019    Procedure: THIRD HAMMER TOE CORRECTION;  Surgeon: Niranjan Child DPM;  [facility];  Service: Podiatry    RI RMVL MATEO CTR VAD W/SUBQ PORT/ CTR/PRPH INSJ N/A 3/14/2023    Procedure: REMOVAL VENOUS PORT (PORT-A-CATH)IR;  Surgeon: Avelino Vázquez DO;  [facility];  Service: Interventional Radiology    TOE OSTEOTOMY Right 03/14/2017    Procedure: HAMMERTOE CORRECTION R 2 ;  Surgeon: Niranjan Child DPM;  [facility];  Service:     TOE OSTEOTOMY Left 11/24/2020    Procedure: LEFT HT CORRECTION TOE;  Surgeon: Niranjan Child DPM;  [facility];  Service: Podiatry    TONSILLECTOMY  1963     Social History     Socioeconomic History    Marital status: /Civil Union     Spouse name: None    Number of children: None    Years of education: None    Highest education level: None   Occupational History    None   Tobacco Use    Smoking status: Never    Smokeless tobacco: Never    Tobacco comments:     Never smoked but exposed to second hand smoke from birth until 1980's   Vaping Use    Vaping status: Never Used   Substance and Sexual Activity    Alcohol use: No    Drug use: No    Sexual activity: Not Currently     Partners: Female     Birth control/protection: Abstinence   Other Topics Concern    None   Social History Narrative    None     Social Determinants of Health     Financial Resource Strain: Not on file   Food Insecurity: No Food Insecurity (5/11/2024)    Hunger Vital Sign     Worried About Running Out of Food in the Last Year: Never true     Ran Out of Food in the Last Year: Never true   Transportation Needs: No Transportation  Needs (5/11/2024)    PRAPARE - Transportation     Lack of Transportation (Medical): No     Lack of Transportation (Non-Medical): No   Physical Activity: Not on file   Stress: Not on file   Social Connections: Not on file   Intimate Partner Violence: Not on file   Housing Stability: Low Risk  (5/11/2024)    Housing Stability Vital Sign     Unable to Pay for Housing in the Last Year: No     Number of Places Lived in the Last Year: 1     Unstable Housing in the Last Year: No        Current Outpatient Medications:     allopurinol (ZYLOPRIM) 300 mg tablet, Take 1 tablet (300 mg total) by mouth daily, Disp: 90 tablet, Rfl: 1    amLODIPine (NORVASC) 10 mg tablet, Take 1 tablet (10 mg total) by mouth daily, Disp: 90 tablet, Rfl: 1    aspirin 81 mg chewable tablet, Chew 81 mg daily, Disp: , Rfl:     Cholecalciferol 100 MCG (4000 UT) CAPS, Take 1 capsule (4,000 Units total) by mouth in the morning, Disp: , Rfl:     Empagliflozin (Jardiance) 25 MG TABS, Take 1 tablet (25 mg total) by mouth every morning, Disp: 90 tablet, Rfl: 2    hydroCHLOROthiazide 25 mg tablet, Take 1 tablet (25 mg total) by mouth daily, Disp: 90 tablet, Rfl: 1    losartan (COZAAR) 100 MG tablet, Take 1 tablet (100 mg total) by mouth daily, Disp: 90 tablet, Rfl: 1    metFORMIN (GLUCOPHAGE) 500 mg tablet, TAKE 2 TABLETS BY MOUTH TWICE A DAY WITH FOOD, Disp: 360 tablet, Rfl: 1    metoprolol succinate (TOPROL-XL) 100 mg 24 hr tablet, Take 1 tablet (100 mg total) by mouth every evening, Disp: 90 tablet, Rfl: 1    Multiple Vitamins-Minerals (Centrum Silver 50+Men) TABS, , Disp: , Rfl:     rosuvastatin (CRESTOR) 40 MG tablet, Take 1 tablet (40 mg total) by mouth every evening, Disp: 90 tablet, Rfl: 1  No current facility-administered medications for this visit.  Family History   Problem Relation Age of Onset    Cancer Father         Leukemia      Review of Systems   Constitutional:  Negative for chills and fever.         Objective:  /81   Pulse 70   Temp  98.1 °F (36.7 °C)   Resp 18     Physical Exam  Neurological:      Mental Status: He is alert.             Wound 09/01/22 Incision Chest Anterior;Right (Active)       Wound 03/14/23 Chest Right (Active)       Wound 09/07/23 Diabetic Ulcer Foot Right;Plantar;Posterior (Active)   Enter Singh score: Singh Grade 1: Partial or full-thickness ulcer (superficial) 05/23/24 1341   Wound Image   05/30/24 1336   Wound Description Pink;Epithelialization;White 05/30/24 1336   Delisa-wound Assessment Fragile;Callus 05/30/24 1336   Wound Length (cm) 0.5 cm 05/30/24 1336   Wound Width (cm) 0.5 cm 05/30/24 1336   Wound Depth (cm) 0.1 cm 05/30/24 1336   Wound Surface Area (cm^2) 0.25 cm^2 05/30/24 1336   Wound Volume (cm^3) 0.025 cm^3 05/30/24 1336   Calculated Wound Volume (cm^3) 0.03 cm^3 05/30/24 1336   Change in Wound Size % 97 05/30/24 1336   Tunneling 1 in depth located at 1-12 o'clock 10/05/23 1348   Number of underminings 1 02/15/24 1344   Undermining 1 0.4 02/15/24 1344   Undermining 1 is depth extending from 9-12 02/15/24 1344   Drainage Amount Moderate 05/30/24 1336   Drainage Description Serosanguineous 05/30/24 1336   Non-staged Wound Description Full thickness 05/30/24 1336   Dressing Status Intact 05/30/24 1336       Wound 05/16/24 Traumatic Pretibial Right (Active)   Wound Image   05/30/24 1337   Wound Description Beefy red;Pink;Epithelialization;Hypergranulation 05/30/24 1337   Delisa-wound Assessment Pink;Fragile 05/30/24 1337   Wound Length (cm) 2.9 cm 05/30/24 1337   Wound Width (cm) 2 cm 05/30/24 1337   Wound Depth (cm) 0.1 cm 05/30/24 1337   Wound Surface Area (cm^2) 5.8 cm^2 05/30/24 1337   Wound Volume (cm^3) 0.58 cm^3 05/30/24 1337   Calculated Wound Volume (cm^3) 0.58 cm^3 05/30/24 1337   Change in Wound Size % 73.52 05/30/24 1337   Drainage Amount Moderate 05/30/24 1337   Drainage Description Serosanguineous;Bloody 05/30/24 1337   Non-staged Wound Description Full thickness 05/30/24 1337   Dressing Status  Intact 05/30/24 1337                         Skin Substitute    Universal Protocol:  Consent: Verbal consent obtained.  Consent given by: patient  Patient understanding: patient states understanding of the procedure being performed  Patient identity confirmed: verbally with patient  Performed by: Physician  Product: [employer] PL.    Application Location and Measurements  Location: genitalia / hands / feet / multiple digits  Skin Sub Lot #: [id number]  Skin Sub Expiration: 17Feb2025  Area (sq cm): 0.58  Product Applied (sq cm): 1.2  Product Wasted (sq cm): 0.4    Application Details  Fenestrated: No  Secured: Yes  Secured With: Steri-Strips  Dressing Applied: Yes  Dressing Comments: mepitel  Procedural pain (0-10): insensate  Post-procedural pain: insensate   Response to treatment: procedure was tolerated well                 Wound Instructions:  Orders Placed This Encounter   Procedures    Wound cleansing and dressings Traumatic Right Pretibial     Right Leg Wound    Wash your hands with soap and water. Remove old dressing, discard into plastic bag and place in trash. Cleanse the wound with 1/4 Strength Dakins Moistened Gauze for 10 minutes prior to applying a clean dressing. Do not use tissue or cotton balls. Do not scrub the wound. Pat dry using gauze.     Shower: Yes, if able to cover with Cast Cover.     Apply Dermagran to the open wound.   Cover with Gauze and ABD.   Secure with Julisa and Tape.    Change dressing every other day.     Standing Status:   Future     Standing Expiration Date:   6/6/2024    Wound cleansing and dressings Diabetic Ulcer Right;Plantar;Posterior Foot     Right Foot Wound     Wound cleansing and dressings Diabetic Ulcer    If dressing becomes wet with drainage may change outer dressing only, do not remove the steristrip area only change bulky dressing (kerlix, cast padding, abd and julisa and tape to secure)     Grafix applied weekly at the wound center.     Standing Status:   Future      "Standing Expiration Date:   6/6/2024    Wound Procedure Treatment Traumatic Right Pretibial     This order was created via procedure documentation    Wound Procedure Treatment Diabetic Ulcer Right;Plantar;Posterior Foot     This order was created via procedure documentation    Skin Substitute     This order was created via procedure documentation         Susie Luu DPM      Portions of the record may have been created with voice recognition software. Occasional wrong word or \"sound a like\" substitutions may have occurred due to the inherent limitations of voice recognition software. Read the chart carefully and recognize, using context, where substitutions have occurred.     "

## 2024-06-06 ENCOUNTER — OFFICE VISIT (OUTPATIENT)
Dept: WOUND CARE | Facility: CLINIC | Age: 69
End: 2024-06-06
Payer: MEDICARE

## 2024-06-06 VITALS
HEART RATE: 75 BPM | TEMPERATURE: 97.2 F | DIASTOLIC BLOOD PRESSURE: 53 MMHG | RESPIRATION RATE: 18 BRPM | SYSTOLIC BLOOD PRESSURE: 108 MMHG

## 2024-06-06 DIAGNOSIS — E08.621 DIABETIC ULCER OF OTHER PART OF RIGHT FOOT ASSOCIATED WITH DIABETES MELLITUS DUE TO UNDERLYING CONDITION, WITH FAT LAYER EXPOSED (HCC): Primary | ICD-10-CM

## 2024-06-06 DIAGNOSIS — L97.512 DIABETIC ULCER OF OTHER PART OF RIGHT FOOT ASSOCIATED WITH DIABETES MELLITUS DUE TO UNDERLYING CONDITION, WITH FAT LAYER EXPOSED (HCC): Primary | ICD-10-CM

## 2024-06-06 PROCEDURE — 11042 DBRDMT SUBQ TIS 1ST 20SQCM/<: CPT | Performed by: PODIATRIST

## 2024-06-06 RX ORDER — LIDOCAINE 40 MG/G
CREAM TOPICAL ONCE
Status: COMPLETED | OUTPATIENT
Start: 2024-06-06 | End: 2024-06-06

## 2024-06-06 RX ADMIN — LIDOCAINE: 40 CREAM TOPICAL at 14:31

## 2024-06-06 NOTE — PROGRESS NOTES
Patient ID: Augustus Coffman is a 68 y.o. male Date of Birth 1955       Chief Complaint   Patient presents with    Follow Up Wound Care Visit     Diabetic ulcer right foot       Allergies:  Lisinopril    Diagnosis:  1. 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 Diabetic ulcer of other part of right foot associated with diabetes mellitus due to underlying condition, with fat layer exposed (HCC)  E08.621 lidocaine (LMX) 4 % cream    L97.512 Wound cleansing and dressings Diabetic Ulcer Right;Plantar;Posterior Foot     Wound Procedure Treatment Diabetic Ulcer Right;Plantar;Posterior Foot           Assessment & Plan:  See wound orders.    Diabetic ulcer right foot/rosa 2 ulcer right foot:  Patient cannot get this ulcer wet and needs to stay off the foot in spite of the grand opening.  We are out of graft options.  Will assess the effectiveness of the retention sutures next week.  Patient may still need surgery of the first ray to address the presence of the ulcer.    Subjective:   This 69 yo male states the foot did not get wet.  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HCC)    Hypertension    Hypercholesteremia    Hammer toe of right foot    Stasis dermatitis of both legs    Diabetic peripheral neuropathy (HCC)    Gout    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HCC)    Obesity with body mass index 30 or greater    Type 2 diabetes mellitus (HCC)     Retroperitoneal hematoma    Mesenteric lymphadenopathy    Acute blood loss anemia    Heart murmur    GI bleed    Pleural effusion    Chronic venous hypertension (idiopathic) with ulcer of right lower extremity (HCC)    Rash    Stage 2 chronic kidney disease    Hypertensive kidney disease with stage 2 chronic kidney disease    Other proteinuria    Obesity, morbid (HCC)    Follicular lymphoma grade I of lymph nodes of multiple sites (HCC)    Vitamin D deficiency    Stage 3a chronic kidney disease (HCC)    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 (HCC)    Ectatic thoracic aorta (HCC)     Past Medical History:   Diagnosis Date    Chronic kidney disease     Diabetes mellitus (HCC)     Follicular lymphoma (HCC)     High cholesterol     Hypertension      Past Surgical History:   Procedure Laterality Date    BUNIONECTOMY Right 11/24/2020    Procedure: RIGHT HAV CORRECTION,;  Surgeon: Niranjan Child DPM;  [facility];  Service: Podiatry    COLONOSCOPY      INCISION AND DRAINAGE OF WOUND Right 10/05/2016    Procedure: INCISION AND DRAINAGE (I&D) EXTREMITY;  Surgeon: Niranjan Child DPM;  [facility];  Service:     IR BIOPSY LYMPH NODE  07/08/2021    IR EMBOLIZATION (SPECIFY VESSEL OR SITE)  07/08/2021    IR PORT PLACEMENT  9/1/2022    PILONIDAL CYST EXCISION      OK CORRECTION HAMMERTOE Right 02/19/2019    Procedure: THIRD HAMMER TOE CORRECTION;  Surgeon: Niranjan Child DPM;  [facility];  Service: Podiatry    OK RMVL MATEO CTR VAD W/SUBQ PORT/ CTR/PRPH INSJ N/A 3/14/2023    Procedure: REMOVAL VENOUS PORT (PORT-A-CATH)IR;  Surgeon: Avelino Vázquez DO;  [facility];  Service: Interventional Radiology    TOE OSTEOTOMY Right 03/14/2017    Procedure: HAMMERTOE CORRECTION R 2 ;  Surgeon: Niranjan Child DPM;  [facility];  Service:     TOE OSTEOTOMY Left 11/24/2020    Procedure: LEFT HT CORRECTION TOE;  Surgeon: Niranjan Child DPM;  [facility];  Service: Podiatry    TONSILLECTOMY  1963      Social History     Socioeconomic History    Marital status: /Civil Union     Spouse name: None    Number of children: None    Years of education: None    Highest education level: None   Occupational History    None   Tobacco Use    Smoking status: Never    Smokeless tobacco: Never    Tobacco comments:     Never smoked but exposed to second hand smoke from birth until 1980's   Vaping Use    Vaping status: Never Used   Substance and Sexual Activity    Alcohol use: No    Drug use: No    Sexual activity: Not Currently     Partners: Female     Birth control/protection: Abstinence   Other Topics Concern    None   Social History Narrative    None     Social Determinants of Health     Financial Resource Strain: Not on file   Food Insecurity: No Food Insecurity (5/11/2024)    Hunger Vital Sign     Worried About Running Out of Food in the Last Year: Never true     Ran Out of Food in the Last Year: Never true   Transportation Needs: No Transportation Needs (5/11/2024)    PRAPARE - Transportation     Lack of Transportation (Medical): No     Lack of Transportation (Non-Medical): No   Physical Activity: Not on file   Stress: Not on file   Social Connections: Not on file   Intimate Partner Violence: Not on file   Housing Stability: Low Risk  (5/11/2024)    Housing Stability Vital Sign     Unable to Pay for Housing in the Last Year: No     Number of Places Lived in the Last Year: 1     Unstable Housing in the Last Year: No        Current Outpatient Medications:     allopurinol (ZYLOPRIM) 300 mg tablet, Take 1 tablet (300 mg total) by mouth daily, Disp: 90 tablet, Rfl: 1    amLODIPine (NORVASC) 10 mg tablet, Take 1 tablet (10 mg total) by mouth daily, Disp: 90 tablet, Rfl: 1    aspirin 81 mg chewable tablet, Chew 81 mg daily, Disp: , Rfl:     Cholecalciferol 100 MCG (4000 UT) CAPS, Take 1 capsule (4,000 Units total) by mouth in the morning, Disp: , Rfl:     Empagliflozin (Jardiance) 25 MG TABS, Take 1 tablet (25 mg total)  by mouth every morning, Disp: 90 tablet, Rfl: 2    hydroCHLOROthiazide 25 mg tablet, Take 1 tablet (25 mg total) by mouth daily, Disp: 90 tablet, Rfl: 1    losartan (COZAAR) 100 MG tablet, Take 1 tablet (100 mg total) by mouth daily, Disp: 90 tablet, Rfl: 1    metFORMIN (GLUCOPHAGE) 500 mg tablet, TAKE 2 TABLETS BY MOUTH TWICE A DAY WITH FOOD, Disp: 360 tablet, Rfl: 1    metoprolol succinate (TOPROL-XL) 100 mg 24 hr tablet, Take 1 tablet (100 mg total) by mouth every evening, Disp: 90 tablet, Rfl: 1    Multiple Vitamins-Minerals (Centrum Silver 50+Men) TABS, , Disp: , Rfl:     rosuvastatin (CRESTOR) 40 MG tablet, Take 1 tablet (40 mg total) by mouth every evening, Disp: 90 tablet, Rfl: 1  No current facility-administered medications for this visit.  Family History   Problem Relation Age of Onset    Cancer Father         Leukemia      Review of Systems   Constitutional:  Negative for chills and fever.         Objective:  /53   Pulse 75   Temp (!) 97.2 °F (36.2 °C)   Resp 18     Physical Exam  Neurological:      Mental Status: He is alert.             Wound 09/01/22 Incision Chest Anterior;Right (Active)       Wound 03/14/23 Chest Right (Active)       Wound 09/07/23 Diabetic Ulcer Foot Right;Plantar;Posterior (Active)   Enter Singh score: Singh Grade 1: Partial or full-thickness ulcer (superficial) 06/06/24 1423   Wound Image   06/06/24 1423   Wound Description Pink;Epithelialization;White 06/06/24 1423   Delisa-wound Assessment Maceration 06/06/24 1423   Wound Length (cm) 1 cm 06/06/24 1423   Wound Width (cm) 1.4 cm 06/06/24 1423   Wound Depth (cm) 0.1 cm 06/06/24 1423   Wound Surface Area (cm^2) 1.4 cm^2 06/06/24 1423   Wound Volume (cm^3) 0.14 cm^3 06/06/24 1423   Calculated Wound Volume (cm^3) 0.14 cm^3 06/06/24 1423   Change in Wound Size % 86 06/06/24 1423   Tunneling 1 in depth located at 1-12 o'clock 10/05/23 1348   Number of underminings 1 02/15/24 1344   Undermining 1 0.4 02/15/24 1344   Undermining  1 is depth extending from 9-12 02/15/24 1344   Drainage Amount Moderate 06/06/24 1423   Drainage Description Serosanguineous 06/06/24 1423   Non-staged Wound Description Full thickness 06/06/24 1423   Dressing Status Intact 06/06/24 1423       Wound 05/16/24 Traumatic Pretibial Right (Active)   Wound Image   06/06/24 1427   Wound Description Epithelialization 06/06/24 1427   Delisa-wound Assessment Pink;Fragile 05/30/24 1337   Wound Length (cm) 0 cm 06/06/24 1427   Wound Width (cm) 0 cm 06/06/24 1427   Wound Depth (cm) 0 cm 06/06/24 1427   Wound Surface Area (cm^2) 0 cm^2 06/06/24 1427   Wound Volume (cm^3) 0 cm^3 06/06/24 1427   Calculated Wound Volume (cm^3) 0 cm^3 06/06/24 1427   Change in Wound Size % 100 06/06/24 1427   Drainage Amount None 06/06/24 1427   Drainage Description Serosanguineous 05/30/24 1337   Non-staged Wound Description Full thickness 06/06/24 1427   Dressing Status Intact 06/06/24 1427                         Debridement    Universal Protocol:  Consent: Verbal consent obtained.  Consent given by: patient  Patient understanding: patient states understanding of the procedure being performed  Patient identity confirmed: verbally with patient    Debridement Details  Performed by: physician  Debridement type: surgical  Level of debridement: subcutaneous tissue  Pain control: lidocaine 1%      Post-debridement measurements  Length (cm): 1.1  Width (cm): 1.5  Depth (cm): 0.2  Percent debrided: 100%  Surface Area (cm^2): 1.65  Area Debrided (cm^2): 1.65  Volume (cm^3): 0.33    Tissue and other material debrided: subcutaneous tissue  Devitalized tissue debrided: slough  Instrument(s) utilized: curette  Technique utilized: nonexcisionalBleeding: medium  Hemostasis obtained with: pressure  Procedural pain (0-10): insensate  Post-procedural pain: insensate   Response to treatment: procedure was tolerated well  Debridement Comments: 3 4-0 Nylon retention sutures placed across the wound bed.  Patient  "tolerated well.               Wound Instructions:  Orders Placed This Encounter   Procedures    Wound cleansing and dressings Diabetic Ulcer Right;Plantar;Posterior Foot     Wound cleansing and dressings Diabetic Ulcer Right;Plantar;Posterior Foot       Right Foot Wound      Wash your hands with soap and water. Remove old dressing, discard into plastic bag and place in trash. Cleanse the wound with Dakins prior to applying a clean dressing. Do not use tissue or cotton balls. Do not scrub the wound. Pat dry using gauze.      Shower: Yes, if able to cover with Cast Cover.     Apply Skin Prep to skin surrounding wound.  Apply Aquacel AG Ribbon to the open wound.   Gently Pack Aquacel AG Ribbon into tunnel of foot wound. Cut Ribbon in half for this part.   Cover with Gauze and ABD.   Secure with Julisa and Tape.      Change dressing Every Day    Need to keep at weight off of right foot at all times     Standing Status:   Future     Standing Expiration Date:   6/13/2024    Wound Procedure Treatment Diabetic Ulcer Right;Plantar;Posterior Foot     This order was created via procedure documentation    Debridement     This order was created via procedure documentation         Susie Luu DPM      Portions of the record may have been created with voice recognition software. Occasional wrong word or \"sound a like\" substitutions may have occurred due to the inherent limitations of voice recognition software. Read the chart carefully and recognize, using context, where substitutions have occurred.     "

## 2024-06-06 NOTE — PROGRESS NOTES
Wound Procedure Treatment Diabetic Ulcer Right;Plantar;Posterior Foot    Performed by: Rohini Roche RN  Authorized by: Susie Luu DPM    Associated wounds:   Wound 09/07/23 Diabetic Ulcer Foot Right;Plantar;Posterior  Wound cleansed with:  Hibiclens  Applied to periwound:  Skin prep  Applied primary dressing:  Silver and Calcium alginate  Applied secondary dressing:  ABD and Gauze  Dressing secured with:  Tape and Julisa

## 2024-06-13 ENCOUNTER — OFFICE VISIT (OUTPATIENT)
Dept: WOUND CARE | Facility: CLINIC | Age: 69
End: 2024-06-13
Payer: MEDICARE

## 2024-06-13 VITALS
HEART RATE: 72 BPM | TEMPERATURE: 97.6 F | DIASTOLIC BLOOD PRESSURE: 60 MMHG | SYSTOLIC BLOOD PRESSURE: 121 MMHG | RESPIRATION RATE: 18 BRPM

## 2024-06-13 DIAGNOSIS — E08.621 DIABETIC ULCER OF OTHER PART OF RIGHT FOOT ASSOCIATED WITH DIABETES MELLITUS DUE TO UNDERLYING CONDITION, WITH FAT LAYER EXPOSED (HCC): Primary | ICD-10-CM

## 2024-06-13 DIAGNOSIS — L97.512 DIABETIC ULCER OF OTHER PART OF RIGHT FOOT ASSOCIATED WITH DIABETES MELLITUS DUE TO UNDERLYING CONDITION, WITH FAT LAYER EXPOSED (HCC): Primary | ICD-10-CM

## 2024-06-13 PROCEDURE — 11042 DBRDMT SUBQ TIS 1ST 20SQCM/<: CPT | Performed by: PODIATRIST

## 2024-06-13 RX ORDER — LIDOCAINE 40 MG/G
CREAM TOPICAL ONCE
Status: COMPLETED | OUTPATIENT
Start: 2024-06-13 | End: 2024-06-13

## 2024-06-13 RX ADMIN — LIDOCAINE: 40 CREAM TOPICAL at 13:53

## 2024-06-13 NOTE — PROGRESS NOTES
Wound Procedure Treatment Diabetic Ulcer Right;Plantar;Posterior Foot    Performed by: Maryana Bocanegra RN  Authorized by: Susie Luu DPM    Associated wounds:   Wound 09/07/23 Diabetic Ulcer Foot Right;Plantar;Posterior  Wound cleansed with:  Dakin's 0.25%  Applied to periwound:  Skin prep  Applied primary dressing:  Silver  Applied secondary dressing:  ABD, Cast padding and Gauze  Dressing secured with:  Julisa and Tape

## 2024-06-13 NOTE — PATIENT INSTRUCTIONS
Orders Placed This Encounter   Procedures    Wound cleansing and dressings Diabetic Ulcer Right;Plantar;Posterior Foot     RIGHT FOOT WOUND    Wash your hands with soap and water.  Remove old dressing, discard into plastic bag and place in trash.  Cleanse the wound with Dakins Solution prior to applying a clean dressing. Do not use tissue or cotton balls. Do not scrub the wound. Pat dry using gauze.    Shower: No     Apply Skin Prep to skin surrounding wound  Apply Silver Alginate to the open wound.   Cover with Gauze and ABD  Secure with Julisa and Tape.     Change dressing every day.     Standing Status:   Future     Standing Expiration Date:   6/20/2024

## 2024-06-13 NOTE — PROGRESS NOTES
Patient ID: Augustus Coffman is a 68 y.o. male Date of Birth 1955       Chief Complaint   Patient presents with    Follow Up Wound Care Visit     RIGHT FOOT WOUND       Allergies:  Lisinopril    Diagnosis:  1. 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 Diabetic ulcer of other part of right foot associated with diabetes mellitus due to underlying condition, with fat layer exposed (HCC)  E08.621 lidocaine (LMX) 4 % cream    L97.512 Wound cleansing and dressings Diabetic Ulcer Right;Plantar;Posterior Foot     Wound Procedure Treatment Diabetic Ulcer Right;Plantar;Posterior Foot           Assessment & Plan:  See wound orders.    Patient will continue current course.  If there is no significant move to healing, TCC or first ray surgery will likely be needed to progress to healing.  Patient voices understanding.  He knows to be very strict with NWB right foot.    Subjective:   This is a 67 yo male with an ulcer of the right foot.  No pain in the foot.  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  He either used crutches or the knee scooter.  The patient also changed the bandage frequently and washed with Dakin's.  He continues on weight management with BS under good control.           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HCC)    Hypertension    Hypercholesteremia    Hammer toe of right foot    Stasis dermatitis of both legs    Diabetic peripheral neuropathy (HCC)    Gout    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HCC)    Obesity  with body mass index 30 or greater    Type 2 diabetes mellitus (HCC)    Retroperitoneal hematoma    Mesenteric lymphadenopathy    Acute blood loss anemia    Heart murmur    GI bleed    Pleural effusion    Chronic venous hypertension (idiopathic) with ulcer of right lower extremity (HCC)    Rash    Stage 2 chronic kidney disease    Hypertensive kidney disease with stage 2 chronic kidney disease    Other proteinuria    Obesity, morbid (HCC)    Follicular lymphoma grade I of lymph nodes of multiple sites (HCC)    Vitamin D deficiency    Stage 3a chronic kidney disease (HCC)    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 (HCC)    Ectatic thoracic aorta (HCC)     Past Medical History:   Diagnosis Date    Chronic kidney disease     Diabetes mellitus (HCC)     Follicular lymphoma (HCC)     High cholesterol     Hypertension      Past Surgical History:   Procedure Laterality Date    BUNIONECTOMY Right 11/24/2020    Procedure: RIGHT HAV CORRECTION,;  Surgeon: Niranjan Child DPM;  [facility];  Service: Podiatry    COLONOSCOPY      INCISION AND DRAINAGE OF WOUND Right 10/05/2016    Procedure: INCISION AND DRAINAGE (I&D) EXTREMITY;  Surgeon: Niranjan Child DPM;  Location[facility];  Service:     IR BIOPSY LYMPH NODE  07/08/2021    IR EMBOLIZATION (SPECIFY VESSEL OR SITE)  07/08/2021    IR PORT PLACEMENT  9/1/2022    PILONIDAL CYST EXCISION      WA CORRECTION HAMMERTOE Right 02/19/2019    Procedure: THIRD HAMMER TOE CORRECTION;  Surgeon: Niranjan Child DPM;  [facility];  Service: Podiatry    WA RMVL MATEO CTR VAD W/SUBQ PORT/ CTR/PRPH INSJ N/A 3/14/2023    Procedure: REMOVAL VENOUS PORT (PORT-A-CATH)IR;  Surgeon: Avelino Vázquez DO;  [facility];  Service: Interventional Radiology    TOE OSTEOTOMY Right 03/14/2017    Procedure: HAMMERTOE CORRECTION R 2 ;  Surgeon: Niranjan Child DPM;  [facility];  Service:     TOE OSTEOTOMY Left 11/24/2020    Procedure: LEFT HT CORRECTION TOE;  Surgeon: Niranjan Child DPM;   [facility];  Service: Podiatry    TONSILLECTOMY  1963     Social History     Socioeconomic History    Marital status: /Civil Union     Spouse name: None    Number of children: None    Years of education: None    Highest education level: None   Occupational History    None   Tobacco Use    Smoking status: Never    Smokeless tobacco: Never    Tobacco comments:     Never smoked but exposed to second hand smoke from birth until 1980's   Vaping Use    Vaping status: Never Used   Substance and Sexual Activity    Alcohol use: No    Drug use: No    Sexual activity: Not Currently     Partners: Female     Birth control/protection: Abstinence   Other Topics Concern    None   Social History Narrative    None     Social Determinants of Health     Financial Resource Strain: Not on file   Food Insecurity: No Food Insecurity (5/11/2024)    Hunger Vital Sign     Worried About Running Out of Food in the Last Year: Never true     Ran Out of Food in the Last Year: Never true   Transportation Needs: No Transportation Needs (5/11/2024)    PRAPARE - Transportation     Lack of Transportation (Medical): No     Lack of Transportation (Non-Medical): No   Physical Activity: Not on file   Stress: Not on file   Social Connections: Not on file   Intimate Partner Violence: Not on file   Housing Stability: Low Risk  (5/11/2024)    Housing Stability Vital Sign     Unable to Pay for Housing in the Last Year: No     Number of Times Moved in the Last Year: 1     Homeless in the Last Year: No        Current Outpatient Medications:     allopurinol (ZYLOPRIM) 300 mg tablet, Take 1 tablet (300 mg total) by mouth daily, Disp: 90 tablet, Rfl: 1    amLODIPine (NORVASC) 10 mg tablet, Take 1 tablet (10 mg total) by mouth daily, Disp: 90 tablet, Rfl: 1    aspirin 81 mg chewable tablet, Chew 81 mg daily, Disp: , Rfl:     Cholecalciferol 100 MCG (4000 UT) CAPS, Take 1 capsule (4,000 Units total) by mouth in the morning, Disp: , Rfl:      Empagliflozin (Jardiance) 25 MG TABS, Take 1 tablet (25 mg total) by mouth every morning, Disp: 90 tablet, Rfl: 2    hydroCHLOROthiazide 25 mg tablet, Take 1 tablet (25 mg total) by mouth daily, Disp: 90 tablet, Rfl: 1    losartan (COZAAR) 100 MG tablet, Take 1 tablet (100 mg total) by mouth daily, Disp: 90 tablet, Rfl: 1    metFORMIN (GLUCOPHAGE) 500 mg tablet, TAKE 2 TABLETS BY MOUTH TWICE A DAY WITH FOOD, Disp: 360 tablet, Rfl: 1    metoprolol succinate (TOPROL-XL) 100 mg 24 hr tablet, Take 1 tablet (100 mg total) by mouth every evening, Disp: 90 tablet, Rfl: 1    Multiple Vitamins-Minerals (Centrum Silver 50+Men) TABS, , Disp: , Rfl:     rosuvastatin (CRESTOR) 40 MG tablet, Take 1 tablet (40 mg total) by mouth every evening, Disp: 90 tablet, Rfl: 1  No current facility-administered medications for this visit.  Family History   Problem Relation Age of Onset    Cancer Father         Leukemia      Review of Systems   Constitutional:  Negative for chills and fever.         Objective:  /60   Pulse 72   Temp 97.6 °F (36.4 °C)   Resp 18     Physical Exam  Neurological:      Mental Status: He is alert.             Wound 09/01/22 Incision Chest Anterior;Right (Active)       Wound 03/14/23 Chest Right (Active)       Wound 09/07/23 Diabetic Ulcer Foot Right;Plantar;Posterior (Active)   Enter Singh score: Singh Grade 1: Partial or full-thickness ulcer (superficial) 06/06/24 1423   Wound Image   06/13/24 1410   Wound Description Pink;Epithelialization;White 06/13/24 1355   Delisa-wound Assessment Maceration;Callus 06/13/24 1355   Wound Length (cm) 0.9 cm 06/13/24 1355   Wound Width (cm) 1.2 cm 06/13/24 1355   Wound Depth (cm) 0.2 cm 06/13/24 1355   Wound Surface Area (cm^2) 1.08 cm^2 06/13/24 1355   Wound Volume (cm^3) 0.216 cm^3 06/13/24 1355   Calculated Wound Volume (cm^3) 0.22 cm^3 06/13/24 1355   Change in Wound Size % 78 06/13/24 1355   Tunneling 1 in depth located at 1-12 o'clock 10/05/23 1344   Number of  underminings 1 06/13/24 1355   Undermining 1 0.6 06/13/24 1355   Undermining 1 is depth extending from 6-12 Deepest at 9 06/13/24 1355   Drainage Amount Moderate 06/13/24 1355   Drainage Description Serosanguineous 06/13/24 1355   Non-staged Wound Description Full thickness 06/13/24 1355   Dressing Status Intact 06/13/24 1355       Wound 05/16/24 Traumatic Pretibial Right (Active)   Wound Image   06/06/24 1427   Wound Description Epithelialization 06/06/24 1427   Delisa-wound Assessment Pink;Fragile 05/30/24 1337   Wound Length (cm) 0 cm 06/06/24 1427   Wound Width (cm) 0 cm 06/06/24 1427   Wound Depth (cm) 0 cm 06/06/24 1427   Wound Surface Area (cm^2) 0 cm^2 06/06/24 1427   Wound Volume (cm^3) 0 cm^3 06/06/24 1427   Calculated Wound Volume (cm^3) 0 cm^3 06/06/24 1427   Change in Wound Size % 100 06/06/24 1427   Drainage Amount None 06/06/24 1427   Drainage Description Serosanguineous 05/30/24 1337   Non-staged Wound Description Full thickness 06/06/24 1427   Dressing Status Intact 06/06/24 1427                         Debridement    Universal Protocol:  Consent: Verbal consent obtained.  Consent given by: patient  Patient understanding: patient states understanding of the procedure being performed  Patient identity confirmed: verbally with patient    Debridement Details  Performed by: physician  Debridement type: surgical  Level of debridement: subcutaneous tissue  Pain control: lidocaine 1%      Post-debridement measurements  Length (cm): 1  Width (cm): 1.3  Depth (cm): 0.2  Percent debrided: 100%  Surface Area (cm^2): 1.3  Area Debrided (cm^2): 1.3  Volume (cm^3): 0.26    Tissue and other material debrided: subcutaneous tissue  Devitalized tissue debrided: callus and slough  Instrument(s) utilized: curette  Technique utilized: nonexcisionalBleeding: medium  Hemostasis obtained with: pressure and silver nitrate  Procedural pain (0-10): insensate  Post-procedural pain: insensate   Response to treatment: procedure  "was tolerated well                 Wound Instructions:  Orders Placed This Encounter   Procedures    Wound cleansing and dressings Diabetic Ulcer Right;Plantar;Posterior Foot     RIGHT FOOT WOUND    Wash your hands with soap and water.  Remove old dressing, discard into plastic bag and place in trash.  Cleanse the wound with Dakins Solution prior to applying a clean dressing. Do not use tissue or cotton balls. Do not scrub the wound. Pat dry using gauze.    Shower: No     Apply Skin Prep to skin surrounding wound  Apply Silver Alginate to the open wound.   Cover with Gauze and ABD  Secure with Julisa and Tape.     Change dressing every day.     Standing Status:   Future     Standing Expiration Date:   6/20/2024    Wound Procedure Treatment Diabetic Ulcer Right;Plantar;Posterior Foot     This order was created via procedure documentation    Debridement     This order was created via procedure documentation         Susie Luu DPM      Portions of the record may have been created with voice recognition software. Occasional wrong word or \"sound a like\" substitutions may have occurred due to the inherent limitations of voice recognition software. Read the chart carefully and recognize, using context, where substitutions have occurred.     "

## 2024-06-19 ENCOUNTER — OFFICE VISIT (OUTPATIENT)
Dept: FAMILY MEDICINE CLINIC | Facility: CLINIC | Age: 69
End: 2024-06-19
Payer: MEDICARE

## 2024-06-19 VITALS
BODY MASS INDEX: 36.84 KG/M2 | HEIGHT: 73 IN | HEART RATE: 69 BPM | DIASTOLIC BLOOD PRESSURE: 84 MMHG | TEMPERATURE: 98.4 F | OXYGEN SATURATION: 96 % | SYSTOLIC BLOOD PRESSURE: 132 MMHG | WEIGHT: 278 LBS

## 2024-06-19 DIAGNOSIS — Z00.00 ENCOUNTER FOR MEDICARE ANNUAL WELLNESS EXAM: Primary | ICD-10-CM

## 2024-06-19 DIAGNOSIS — E13.9 DIABETES 1.5, MANAGED AS TYPE 2 (HCC): ICD-10-CM

## 2024-06-19 DIAGNOSIS — I77.810 ECTATIC THORACIC AORTA (HCC): ICD-10-CM

## 2024-06-19 PROCEDURE — 99213 OFFICE O/P EST LOW 20 MIN: CPT | Performed by: FAMILY MEDICINE

## 2024-06-19 PROCEDURE — G0438 PPPS, INITIAL VISIT: HCPCS | Performed by: FAMILY MEDICINE

## 2024-06-19 NOTE — PROGRESS NOTES
Ambulatory Visit  Name: Augustus Coffman      : 1955      MRN: [id number]  Encounter Provider: Gwen Lazo DO  Encounter Date: 2024   Encounter department: [place] [id number] [address]    Assessment & Plan   1. Encounter for Medicare annual wellness exam  2. Diabetes 1.5, managed as type 2 (HCC)  -     Hemoglobin A1C; Future  3. Ectatic thoracic aorta (HCC)  -     Echo complete w/ contrast if indicated; Future; Expected date: 2024    Foot exam declined due to currently being treated with podiatry/wound care for skin graft and non healing DM foot ulcer.      Preventive health issues were discussed with patient, and age appropriate screening tests were ordered as noted in patient's After Visit Summary. Personalized health advice and appropriate referrals for health education or preventive services given if needed, as noted in patient's After Visit Summary.    History of Present Illness     HPI   Patient Care Team:  Gwen Lazo DO as PCP - General (Family Medicine)  MD Tamara Guerrero PA-C as Physician Assistant (Gastroenterology)  Jorden Heath MD (Nephrology)    Review of Systems  Medical History Reviewed by provider this encounter:  Tobacco  Allergies  Meds  Problems  Med Hx  Surg Hx  Fam Hx       Annual Wellness Visit Questionnaire   Augustus is here for his Subsequent Wellness visit.     Health Risk Assessment:   Patient rates overall health as good. Patient feels that their physical health rating is much better. Patient is very satisfied with their life. Eyesight was rated as same. Hearing was rated as same. Patient feels that their emotional and mental health rating is slightly better. Patients states they are sometimes angry. Patient states they are never, rarely unusually tired/fatigued. Pain experienced in the last 7 days has been none. Patient states that he has experienced no weight loss or gain in last 6 months.      Depression Screening:   PHQ-2 Score: 0      Fall Risk Screening:   In the past year, patient has experienced: no history of falling in past year      Home Safety:  Patient does not have trouble with stairs inside or outside of their home. Patient has no working smoke alarms and has no working carbon monoxide detector. Home safety hazards include: none.     Nutrition:   Current diet is Diabetic, Low Cholesterol, Low Saturated Fat, No Added Salt and Limited junk food.     Medications:   Patient is not currently taking any over-the-counter supplements. Patient is able to manage medications.     Activities of Daily Living (ADLs)/Instrumental Activities of Daily Living (IADLs):   Walk and transfer into and out of bed and chair?: Yes  Dress and groom yourself?: Yes    Bathe or shower yourself?: Yes    Feed yourself? Yes  Do your laundry/housekeeping?: Yes  Manage your money, pay your bills and track your expenses?: Yes  Make your own meals?: Yes    Do your own shopping?: Yes    Previous Hospitalizations:   Any hospitalizations or ED visits within the last 12 months?: No      Advance Care Planning:   Living will: No    Durable POA for healthcare: No    Advanced directive: No      Cognitive Screening:   Provider or family/friend/caregiver concerned regarding cognition?: No    PREVENTIVE SCREENINGS      Cardiovascular Screening:    General: Screening Not Indicated and History Lipid Disorder      Diabetes Screening:     General: Screening Not Indicated and History Diabetes      Colorectal Cancer Screening:     General: Screening Current      Abdominal Aortic Aneurysm (AAA) Screening:    Risk factors include: age between 65-74 yo        Lung Cancer Screening:     General: Screening Not Indicated      Hepatitis C Screening:    General: Screening Current    Screening, Brief Intervention, and Referral to Treatment (SBIRT)    Screening  Typical number of drinks in a day: 0  Typical number of drinks in a week:  "0  Interpretation: Low risk drinking behavior.    AUDIT-C Screenin) How often did you have a drink containing alcohol in the past year? never  2) How many drinks did you have on a typical day when you were drinking in the past year? 0  3) How often did you have 6 or more drinks on one occasion in the past year? never    AUDIT-C Score: 0  Interpretation: Score 0-3 (male): Negative screen for alcohol misuse    Single Item Drug Screening:  How often have you used an illegal drug (including marijuana) or a prescription medication for non-medical reasons in the past year? never    Single Item Drug Screen Score: 0  Interpretation: Negative screen for possible drug use disorder    Social Determinants of Health     Food Insecurity: No Food Insecurity (2024)    Hunger Vital Sign    • Worried About Running Out of Food in the Last Year: Never true    • Ran Out of Food in the Last Year: Never true   Transportation Needs: No Transportation Needs (2024)    PRAPARE - Transportation    • Lack of Transportation (Medical): No    • Lack of Transportation (Non-Medical): No   Housing Stability: Low Risk  (2024)    Housing Stability Vital Sign    • Unable to Pay for Housing in the Last Year: No    • Number of Times Moved in the Last Year: 0    • Homeless in the Last Year: No   Utilities: Not At Risk (2024)    [facility] Utilities    • Threatened with loss of utilities: No     No results found.    Objective     /84 (BP Location: Left arm, Patient Position: Sitting, Cuff Size: Large)   Pulse 69   Temp 98.4 °F (36.9 °C) (Tympanic)   Ht 6' 1\" (1.854 m)   Wt 126 kg (278 lb)   SpO2 96%   BMI 36.68 kg/m²     Physical Exam  Vitals reviewed.   Constitutional:       General: He is not in acute distress.     Appearance: Normal appearance. He is obese.   HENT:      Head: Normocephalic and atraumatic.      Right Ear: External ear normal.      Left Ear: External ear normal.      Nose: Nose normal.      Mouth/Throat:      " Mouth: Mucous membranes are moist.   Eyes:      Extraocular Movements: Extraocular movements intact.      Conjunctiva/sclera: Conjunctivae normal.   Cardiovascular:      Rate and Rhythm: Normal rate and regular rhythm.      Heart sounds: Normal heart sounds.   Pulmonary:      Effort: Pulmonary effort is normal.      Breath sounds: Normal breath sounds. No wheezing, rhonchi or rales.   Abdominal:      General: Bowel sounds are normal.      Palpations: Abdomen is soft.      Tenderness: There is no abdominal tenderness.   Musculoskeletal:      Right lower leg: No edema.      Left lower leg: No edema.   Skin:     General: Skin is warm.      Capillary Refill: Capillary refill takes less than 2 seconds.      Findings: No rash.   Neurological:      Mental Status: He is alert. Mental status is at baseline.       Administrative Statements       DO Justin Garza [facility]  6/19/2024 2:24 PM

## 2024-06-19 NOTE — PATIENT INSTRUCTIONS
Medicare Preventive Visit Patient Instructions  Thank you for completing your Welcome to Medicare Visit or Medicare Annual Wellness Visit today. Your next wellness visit will be due in one year (6/20/2025).  The screening/preventive services that you may require over the next 5-10 years are detailed below. Some tests may not apply to you based off risk factors and/or age. Screening tests ordered at today's visit but not completed yet may show as past due. Also, please note that scanned in results may not display below.  Preventive Screenings:  Service Recommendations Previous Testing/Comments   Colorectal Cancer Screening  Colonoscopy    Fecal Occult Blood Test (FOBT)/Fecal Immunochemical Test (FIT)  Fecal DNA/Cologuard Test  Flexible Sigmoidoscopy Age: 45-75 years old   Colonoscopy: every 10 years (May be performed more frequently if at higher risk)  OR  FOBT/FIT: every 1 year  OR  Cologuard: every 3 years  OR  Sigmoidoscopy: every 5 years  Screening may be recommended earlier than age 45 if at higher risk for colorectal cancer. Also, an individualized decision between you and your healthcare provider will decide whether screening between the ages of 76-85 would be appropriate. Colonoscopy: 06/28/2021  FOBT/FIT: Not on file  Cologuard: Not on file  Sigmoidoscopy: Not on file    Screening Current     Prostate Cancer Screening Individualized decision between patient and health care provider in men between ages of 55-69   Medicare will cover every 12 months beginning on the day after your 50th birthday PSA: 0.65 ng/mL           Hepatitis C Screening Once for adults born between 1945 and 1965  More frequently in patients at high risk for Hepatitis C Hep C Antibody: Not on file    Screening Current   Diabetes Screening 1-2 times per year if you're at risk for diabetes or have pre-diabetes Fasting glucose: 115 mg/dL (4/18/2024)  A1C: 6.7 % (3/2/2024)  Screening Not Indicated  History Diabetes   Cholesterol Screening Once  every 5 years if you don't have a lipid disorder. May order more often based on risk factors. Lipid panel: 04/18/2024  Screening Not Indicated  History Lipid Disorder      Other Preventive Screenings Covered by Medicare:  Abdominal Aortic Aneurysm (AAA) Screening: covered once if your at risk. You're considered to be at risk if you have a family history of AAA or a male between the age of 65-75 who smoking at least 100 cigarettes in your lifetime.  Lung Cancer Screening: covers low dose CT scan once per year if you meet all of the following conditions: (1) Age 55-77; (2) No signs or symptoms of lung cancer; (3) Current smoker or have quit smoking within the last 15 years; (4) You have a tobacco smoking history of at least 20 pack years (packs per day x number of years you smoked); (5) You get a written order from a healthcare provider.  Glaucoma Screening: covered annually if you're considered high risk: (1) You have diabetes OR (2) Family history of glaucoma OR (3)  aged 50 and older OR (4)  American aged 65 and older  Osteoporosis Screening: covered every 2 years if you meet one of the following conditions: (1) Have a vertebral abnormality; (2) On glucocorticoid therapy for more than 3 months; (3) Have primary hyperparathyroidism; (4) On osteoporosis medications and need to assess response to drug therapy.  HIV Screening: covered annually if you're between the age of 15-65. Also covered annually if you are younger than 15 and older than 65 with risk factors for HIV infection. For pregnant patients, it is covered up to 3 times per pregnancy.    Immunizations:  Immunization Recommendations   Influenza Vaccine Annual influenza vaccination during flu season is recommended for all persons aged >= 6 months who do not have contraindications   Pneumococcal Vaccine   * Pneumococcal conjugate vaccine = PCV13 (Prevnar 13), PCV15 (Vaxneuvance), PCV20 (Prevnar 20)  * Pneumococcal polysaccharide vaccine  = PPSV23 (Pneumovax) Adults 19-63 yo with certain risk factors or if 65+ yo  If never received any pneumonia vaccine: recommend Prevnar 20 (PCV20)  Give PCV20 if previously received 1 dose of PCV13 or PPSV23   Hepatitis B Vaccine 3 dose series if at intermediate or high risk (ex: diabetes, end stage renal disease, liver disease)   Respiratory syncytial virus (RSV) Vaccine - COVERED BY MEDICARE PART D  * RSVPreF3 (Arexvy) CDC recommends that adults 60 years of age and older may receive a single dose of RSV vaccine using shared clinical decision-making (SCDM)   Tetanus (Td) Vaccine - COST NOT COVERED BY MEDICARE PART B Following completion of primary series, a booster dose should be given every 10 years to maintain immunity against tetanus. Td may also be given as tetanus wound prophylaxis.   Tdap Vaccine - COST NOT COVERED BY MEDICARE PART B Recommended at least once for all adults. For pregnant patients, recommended with each pregnancy.   Shingles Vaccine (Shingrix) - COST NOT COVERED BY MEDICARE PART B  2 shot series recommended in those 19 years and older who have or will have weakened immune systems or those 50 years and older     Health Maintenance Due:      Topic Date Due   • Colorectal Cancer Screening  06/26/2031   • Hepatitis C Screening  Completed     Immunizations Due:      Topic Date Due   • COVID-19 Vaccine (4 - 2023-24 season) 09/01/2023     Advance Directives   What are advance directives?  Advance directives are legal documents that state your wishes and plans for medical care. These plans are made ahead of time in case you lose your ability to make decisions for yourself. Advance directives can apply to any medical decision, such as the treatments you want, and if you want to donate organs.   What are the types of advance directives?  There are many types of advance directives, and each state has rules about how to use them. You may choose a combination of any of the following:  Living will:  This is  a written record of the treatment you want. You can also choose which treatments you do not want, which to limit, and which to stop at a certain time. This includes surgery, medicine, IV fluid, and tube feedings.   Durable power of  for healthcare (DPAHC):  This is a written record that states who you want to make healthcare choices for you when you are unable to make them for yourself. This person, called a proxy, is usually a family member or a friend. You may choose more than 1 proxy.  Do not resuscitate (DNR) order:  A DNR order is used in case your heart stops beating or you stop breathing. It is a request not to have certain forms of treatment, such as CPR. A DNR order may be included in other types of advance directives.  Medical directive:  This covers the care that you want if you are in a coma, near death, or unable to make decisions for yourself. You can list the treatments you want for each condition. Treatment may include pain medicine, surgery, blood transfusions, dialysis, IV or tube feedings, and a ventilator (breathing machine).  Values history:  This document has questions about your views, beliefs, and how you feel and think about life. This information can help others choose the care that you would choose.  Why are advance directives important?  An advance directive helps you control your care. Although spoken wishes may be used, it is better to have your wishes written down. Spoken wishes can be misunderstood, or not followed. Treatments may be given even if you do not want them. An advance directive may make it easier for your family to make difficult choices about your care.   Weight Management   Why it is important to manage your weight:  Being overweight increases your risk of health conditions such as heart disease, high blood pressure, type 2 diabetes, and certain types of cancer. It can also increase your risk for osteoarthritis, sleep apnea, and other respiratory problems. Aim  for a slow, steady weight loss. Even a small amount of weight loss can lower your risk of health problems.  How to lose weight safely:  A safe and healthy way to lose weight is to eat fewer calories and get regular exercise. You can lose up about 1 pound a week by decreasing the number of calories you eat by 500 calories each day.   Healthy meal plan for weight management:  A healthy meal plan includes a variety of foods, contains fewer calories, and helps you stay healthy. A healthy meal plan includes the following:  Eat whole-grain foods more often.  A healthy meal plan should contain fiber. Fiber is the part of grains, fruits, and vegetables that is not broken down by your body. Whole-grain foods are healthy and provide extra fiber in your diet. Some examples of whole-grain foods are whole-wheat breads and pastas, oatmeal, brown rice, and bulgur.  Eat a variety of vegetables every day.  Include dark, leafy greens such as spinach, kale, merrill greens, and mustard greens. Eat yellow and orange vegetables such as carrots, sweet potatoes, and winter squash.   Eat a variety of fruits every day.  Choose fresh or canned fruit (canned in its own juice or light syrup) instead of juice. Fruit juice has very little or no fiber.  Eat low-fat dairy foods.  Drink fat-free (skim) milk or 1% milk. Eat fat-free yogurt and low-fat cottage cheese. Try low-fat cheeses such as mozzarella and other reduced-fat cheeses.  Choose meat and other protein foods that are low in fat.  Choose beans or other legumes such as split peas or lentils. Choose fish, skinless poultry (chicken or turkey), or lean cuts of red meat (beef or pork). Before you cook meat or poultry, cut off any visible fat.   Use less fat and oil.  Try baking foods instead of frying them. Add less fat, such as margarine, sour cream, regular salad dressing and mayonnaise to foods. Eat fewer high-fat foods. Some examples of high-fat foods include french fries, doughnuts, ice  cream, and cakes.  Eat fewer sweets.  Limit foods and drinks that are high in sugar. This includes candy, cookies, regular soda, and sweetened drinks.  Exercise:  Exercise at least 30 minutes per day on most days of the week. Some examples of exercise include walking, biking, dancing, and swimming. You can also fit in more physical activity by taking the stairs instead of the elevator or parking farther away from stores. Ask your healthcare provider about the best exercise plan for you.      © Copyright [employer] 2018 Information is for End User's use only and may not be sold, redistributed or otherwise used for commercial purposes. All illustrations and images included in CareNotes® are the copyrighted property of [employer] Inc[employer] or [employer]

## 2024-06-20 ENCOUNTER — OFFICE VISIT (OUTPATIENT)
Dept: WOUND CARE | Facility: CLINIC | Age: 69
End: 2024-06-20
Payer: MEDICARE

## 2024-06-20 VITALS
TEMPERATURE: 98.3 F | DIASTOLIC BLOOD PRESSURE: 72 MMHG | RESPIRATION RATE: 18 BRPM | SYSTOLIC BLOOD PRESSURE: 148 MMHG | HEART RATE: 77 BPM

## 2024-06-20 DIAGNOSIS — L97.512 DIABETIC ULCER OF OTHER PART OF RIGHT FOOT ASSOCIATED WITH DIABETES MELLITUS DUE TO UNDERLYING CONDITION, WITH FAT LAYER EXPOSED (HCC): Primary | ICD-10-CM

## 2024-06-20 DIAGNOSIS — E08.621 DIABETIC ULCER OF OTHER PART OF RIGHT FOOT ASSOCIATED WITH DIABETES MELLITUS DUE TO UNDERLYING CONDITION, WITH FAT LAYER EXPOSED (HCC): Primary | ICD-10-CM

## 2024-06-20 PROCEDURE — 11042 DBRDMT SUBQ TIS 1ST 20SQCM/<: CPT | Performed by: PODIATRIST

## 2024-06-20 RX ORDER — LIDOCAINE 40 MG/G
CREAM TOPICAL ONCE
Status: COMPLETED | OUTPATIENT
Start: 2024-06-20 | End: 2024-06-20

## 2024-06-20 RX ADMIN — LIDOCAINE: 40 CREAM TOPICAL at 13:58

## 2024-06-20 NOTE — PROGRESS NOTES
Patient ID: Augustus Coffman is a 68 y.o. male Date of Birth 1955       Chief Complaint   Patient presents with    Follow Up Wound Care Visit     RIGHT FOOT WOUND       Allergies:  Lisinopril    Diagnosis:  1. 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 Diabetic ulcer of other part of right foot associated with diabetes mellitus due to underlying condition, with fat layer exposed (HCC)  E08.621 lidocaine (LMX) 4 % cream    L97.512 Wound cleansing and dressings Diabetic Ulcer Right;Plantar;Posterior Foot     Wound Procedure Treatment Diabetic Ulcer Right;Plantar;Posterior Foot           Assessment & Plan:  See wound orders.    Diabetic ulcer right foot to fat layer/rosa 2:  Patient will need to try to stay off the foot as much as possible.  Use the scooter or crutches at all times.  Patient is a good candidate for TCC and surgery like great toe fusion or gastroc recession.  These were discussed with the patient and will likely have to wait until his wife is recovered.  Continue tight BS control and weight management.  Follow up one week.    Subjective:   The patient did try to stay off the foot with his scooter.  It was not 100%.  He is also coming down with a cold.  He also informs us that his wife is having a serious surgery early July and he needs to be able to drive for her.        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[facility])    Hypertension    Hypercholesteremia    Hammer toe of right foot    Stasis dermatitis of both legs    Diabetic peripheral  neuropathy (HCC)    Gout    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HCC)    Obesity with body mass index 30 or greater    Type 2 diabetes mellitus (HCC)    Retroperitoneal hematoma    Mesenteric lymphadenopathy    Acute blood loss anemia    Heart murmur    GI bleed    Pleural effusion    Chronic venous hypertension (idiopathic) with ulcer of right lower extremity (HCC)    Rash    Stage 2 chronic kidney disease    Hypertensive kidney disease with stage 2 chronic kidney disease    Other proteinuria    Obesity, morbid (HCC)    Follicular lymphoma grade I of lymph nodes of multiple sites (HCC)    Vitamin D deficiency    Stage 3a chronic kidney disease (HCC)    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 (HCC)    Ectatic thoracic aorta (HCC)     Past Medical History:   Diagnosis Date    Arthritis 2010's    Occasionally flares up    Cancer (HCC) May 2021    Still investigating    Chronic kidney disease     Diabetes mellitus (HCC)     Follicular lymphoma (HCC)     GERD (gastroesophageal reflux disease) June 2021    Noticed in an Endoscopy    Heart murmur May 2020    High cholesterol     Hypertension     Kidney stone 1980's    Have had since 1980's    Obesity Since Childhood     Past Surgical History:   Procedure Laterality Date    BUNIONECTOMY Right 11/24/2020    Procedure: RIGHT HAV CORRECTION,;  Surgeon: Niranjan Child DPM;  [facility];  Service: Podiatry    COLONOSCOPY      INCISION AND DRAINAGE OF WOUND Right 10/05/2016    Procedure: INCISION AND DRAINAGE (I&D) EXTREMITY;  Surgeon: Niranjan Child DPM;  Location[facility];  Service:     IR BIOPSY LYMPH NODE  07/08/2021    IR EMBOLIZATION (SPECIFY VESSEL OR SITE)  07/08/2021    IR PORT PLACEMENT  9/1/2022    PILONIDAL CYST EXCISION      DE CORRECTION HAMMERTOE Right 02/19/2019    Procedure: THIRD HAMMER TOE CORRECTION;  Surgeon: Niranjan Child DPM;  [facility];  Service: Podiatry    DE RMVL MATEO CTR VAD W/SUBQ PORT/ CTR/PRPH INSJ N/A 3/14/2023    Procedure: REMOVAL VENOUS  PORT (PORT-A-CATH)IR;  Surgeon: Avelino Vázquez DO;  [facility];  Service: Interventional Radiology    TOE OSTEOTOMY Right 03/14/2017    Procedure: HAMMERTOE CORRECTION R 2 ;  Surgeon: Niranjan Child DPM;  [facility];  Service:     TOE OSTEOTOMY Left 11/24/2020    Procedure: LEFT HT CORRECTION TOE;  Surgeon: Niranjan Child DPM;  [facility];  Service: Podiatry    TONSILLECTOMY  1963     Social History     Socioeconomic History    Marital status: /Civil Union     Spouse name: None    Number of children: None    Years of education: None    Highest education level: None   Occupational History    None   Tobacco Use    Smoking status: Never    Smokeless tobacco: Never    Tobacco comments:     Never smoked but exposed to second hand smoke from birth until 1980's   Vaping Use    Vaping status: Never Used   Substance and Sexual Activity    Alcohol use: Never    Drug use: Never    Sexual activity: Not Currently     Partners: Female     Birth control/protection: Abstinence   Other Topics Concern    None   Social History Narrative    None     Social Determinants of Health     Financial Resource Strain: Not on file   Food Insecurity: No Food Insecurity (6/16/2024)    Hunger Vital Sign     Worried About Running Out of Food in the Last Year: Never true     Ran Out of Food in the Last Year: Never true   Transportation Needs: No Transportation Needs (6/16/2024)    PRAPARE - Transportation     Lack of Transportation (Medical): No     Lack of Transportation (Non-Medical): No   Physical Activity: Not on file   Stress: Not on file   Social Connections: Not on file   Intimate Partner Violence: Not on file   Housing Stability: Low Risk  (6/16/2024)    Housing Stability Vital Sign     Unable to Pay for Housing in the Last Year: No     Number of Times Moved in the Last Year: 0     Homeless in the Last Year: No        Current Outpatient Medications:     allopurinol (ZYLOPRIM) 300 mg tablet, Take 1  tablet (300 mg total) by mouth daily, Disp: 90 tablet, Rfl: 1    amLODIPine (NORVASC) 10 mg tablet, Take 1 tablet (10 mg total) by mouth daily, Disp: 90 tablet, Rfl: 1    aspirin 81 mg chewable tablet, Chew 81 mg daily, Disp: , Rfl:     Cholecalciferol 100 MCG (4000 UT) CAPS, Take 1 capsule (4,000 Units total) by mouth in the morning, Disp: , Rfl:     Empagliflozin (Jardiance) 25 MG TABS, Take 1 tablet (25 mg total) by mouth every morning, Disp: 90 tablet, Rfl: 2    hydroCHLOROthiazide 25 mg tablet, Take 1 tablet (25 mg total) by mouth daily, Disp: 90 tablet, Rfl: 1    losartan (COZAAR) 100 MG tablet, Take 1 tablet (100 mg total) by mouth daily, Disp: 90 tablet, Rfl: 1    metFORMIN (GLUCOPHAGE) 500 mg tablet, TAKE 2 TABLETS BY MOUTH TWICE A DAY WITH FOOD, Disp: 360 tablet, Rfl: 1    metoprolol succinate (TOPROL-XL) 100 mg 24 hr tablet, Take 1 tablet (100 mg total) by mouth every evening, Disp: 90 tablet, Rfl: 1    Multiple Vitamins-Minerals (Centrum Silver 50+Men) TABS, , Disp: , Rfl:     rosuvastatin (CRESTOR) 40 MG tablet, Take 1 tablet (40 mg total) by mouth every evening, Disp: 90 tablet, Rfl: 1  No current facility-administered medications for this visit.  Family History   Problem Relation Age of Onset    Cancer Father         Leukemia      Review of Systems   Constitutional:  Negative for chills and fever.         Objective:  /72   Pulse 77   Temp 98.3 °F (36.8 °C)   Resp 18     Physical Exam  Neurological:      Mental Status: He is alert.             Wound 09/01/22 Incision Chest Anterior;Right (Active)       Wound 03/14/23 Chest Right (Active)       Wound 09/07/23 Diabetic Ulcer Foot Right;Plantar;Posterior (Active)   Enter Singh score: Singh Grade 1: Partial or full-thickness ulcer (superficial) 06/20/24 1355   Wound Image   06/20/24 1406   Wound Description Pink;Yellow;White 06/20/24 1355   Delisa-wound Assessment Callus 06/20/24 1355   Wound Length (cm) 1.4 cm 06/20/24 1355   Wound Width (cm) 2  cm 06/20/24 1355   Wound Depth (cm) 0.1 cm 06/20/24 1355   Wound Surface Area (cm^2) 2.8 cm^2 06/20/24 1355   Wound Volume (cm^3) 0.28 cm^3 06/20/24 1355   Calculated Wound Volume (cm^3) 0.28 cm^3 06/20/24 1355   Change in Wound Size % 72 06/20/24 1355   Tunneling 1 in depth located at 1-12 o'clock 10/05/23 1348   Number of underminings 1 06/13/24 1355   Undermining 1 0.6 06/13/24 1355   Undermining 1 is depth extending from 6-12 Deepest at 9 06/13/24 1355   Drainage Amount Moderate 06/20/24 1355   Drainage Description Serosanguineous 06/20/24 1355   Non-staged Wound Description Full thickness 06/20/24 1355   Dressing Status Intact 06/20/24 1355                         Debridement   Wound 09/07/23 Diabetic Ulcer Foot Right;Plantar;Posterior    Universal Protocol:  Consent: Verbal consent obtained.  Consent given by: patient  Patient understanding: patient states understanding of the procedure being performed  Patient identity confirmed: verbally with patient    Debridement Details  Performed by: physician  Debridement type: surgical  Level of debridement: subcutaneous tissue  Pain control: lidocaine 1%      Post-debridement measurements  Length (cm): 1.4  Width (cm): 2  Depth (cm): 0.2  Percent debrided: 100%  Surface Area (cm^2): 2.8  Area Debrided (cm^2): 2.8  Volume (cm^3): 0.56    Devitalized tissue debrided: slough  Instrument(s) utilized: curette  Technique utilized: nonexcisionalBleeding: medium  Hemostasis obtained with: pressure  Procedural pain (0-10): insensate  Post-procedural pain: insensate   Response to treatment: procedure was tolerated well                 Wound Instructions:  Orders Placed This Encounter   Procedures    Wound cleansing and dressings Diabetic Ulcer Right;Plantar;Posterior Foot     RIGHT FOOT WOUND     Wash your hands with soap and water. Remove old dressing, discard into plastic bag and place in trash. Cleanse the wound with Dakins Solution prior to applying a clean dressing. Do not  "use tissue or cotton balls. Do not scrub the wound. Pat dry using gauze.     Shower: No     Apply Skin Prep to skin surrounding wound.   Apply Silver Alginate to the open wound.   Cover with Gauze and ABD.   Secure with Julisa and Tape.     Change dressing every day.     Standing Status:   Future     Standing Expiration Date:   6/27/2024    Wound Procedure Treatment Diabetic Ulcer Right;Plantar;Posterior Foot     This order was created via procedure documentation    Debridement     This order was created via procedure documentation         Susie Luu DPM      Portions of the record may have been created with voice recognition software. Occasional wrong word or \"sound a like\" substitutions may have occurred due to the inherent limitations of voice recognition software. Read the chart carefully and recognize, using context, where substitutions have occurred.     "

## 2024-06-20 NOTE — PATIENT INSTRUCTIONS
Orders Placed This Encounter   Procedures    Wound cleansing and dressings Diabetic Ulcer Right;Plantar;Posterior Foot     RIGHT FOOT WOUND     Wash your hands with soap and water. Remove old dressing, discard into plastic bag and place in trash. Cleanse the wound with Dakins Solution prior to applying a clean dressing. Do not use tissue or cotton balls. Do not scrub the wound. Pat dry using gauze.     Shower: No     Apply Skin Prep to skin surrounding wound.   Apply Silver Alginate to the open wound.   Cover with Gauze and ABD.   Secure with Jluisa and Tape.     Change dressing every day.     Standing Status:   Future     Standing Expiration Date:   6/27/2024

## 2024-06-20 NOTE — PROGRESS NOTES
Wound Procedure Treatment Diabetic Ulcer Right;Plantar;Posterior Foot    Performed by: Maryana Bocanegra RN  Authorized by: Susie Luu DPM    Associated wounds:   Wound 09/07/23 Diabetic Ulcer Foot Right;Plantar;Posterior  Applied to periwound:  Skin prep  Applied primary dressing:  Silver  Applied secondary dressing:  ABD, Cast padding and Gauze  Dressing secured with:  Julisa and Tape  Offloading device appllied:  Surgical shoe

## 2024-06-27 ENCOUNTER — OFFICE VISIT (OUTPATIENT)
Dept: WOUND CARE | Facility: CLINIC | Age: 69
End: 2024-06-27
Payer: MEDICARE

## 2024-06-27 VITALS
RESPIRATION RATE: 18 BRPM | SYSTOLIC BLOOD PRESSURE: 124 MMHG | DIASTOLIC BLOOD PRESSURE: 62 MMHG | HEART RATE: 77 BPM | TEMPERATURE: 97.1 F

## 2024-06-27 DIAGNOSIS — L97.512 DIABETIC ULCER OF OTHER PART OF RIGHT FOOT ASSOCIATED WITH DIABETES MELLITUS DUE TO UNDERLYING CONDITION, WITH FAT LAYER EXPOSED (HCC): Primary | ICD-10-CM

## 2024-06-27 DIAGNOSIS — E08.621 DIABETIC ULCER OF OTHER PART OF RIGHT FOOT ASSOCIATED WITH DIABETES MELLITUS DUE TO UNDERLYING CONDITION, WITH FAT LAYER EXPOSED (HCC): Primary | ICD-10-CM

## 2024-06-27 PROCEDURE — 11042 DBRDMT SUBQ TIS 1ST 20SQCM/<: CPT | Performed by: PODIATRIST

## 2024-06-27 RX ORDER — LIDOCAINE 40 MG/G
CREAM TOPICAL ONCE
Status: COMPLETED | OUTPATIENT
Start: 2024-06-27 | End: 2024-06-27

## 2024-06-27 RX ADMIN — LIDOCAINE: 40 CREAM TOPICAL at 15:10

## 2024-06-27 NOTE — PROGRESS NOTES
Wound Procedure Treatment Diabetic Ulcer Right;Plantar;Posterior Foot    Performed by: Rohini Roche RN  Authorized by: Susie Luu DPM    Associated wounds:   Wound 09/07/23 Diabetic Ulcer Foot Right;Plantar;Posterior  Wound cleansed with:  Dakin's 0.25%  Applied to periwound:  Skin prep  Applied primary dressing:  Silver and Calcium alginate  Applied secondary dressing:  ABD and Gauze  Dressing secured with:  Julisa and Tape

## 2024-06-27 NOTE — PATIENT INSTRUCTIONS
CST     Orders Placed This Encounter   Procedures    Wound cleansing and dressings Diabetic Ulcer Right;Plantar;Posterior Foot     Wound cleansing and dressings Diabetic Ulcer Right;Plantar;Posterior Foot       RIGHT FOOT WOUND      Wash your hands with soap and water. Remove old dressing, discard into plastic bag and place in trash. Cleanse the wound with Dakins Solution prior to applying a clean dressing. Do not use tissue or cotton balls. Do not scrub the wound. Pat dry using gauze.      Shower: No      Apply Skin Prep to skin surrounding wound.   Apply Silver Alginate to the open wound.   Cover with Gauze and ABD.   Secure with Julisa and Tape.      Change dressing every day    Keep at weight off of right foot     Standing Status:   Future     Standing Expiration Date:   7/4/2024    Wound Procedure Treatment Diabetic Ulcer Right;Plantar;Posterior Foot     This order was created via procedure documentation

## 2024-06-27 NOTE — PROGRESS NOTES
Patient ID: Augustus Coffman is a 68 y.o. male Date of Birth 1955       Chief Complaint   Patient presents with    Follow Up Wound Care Visit     Diabetic ulcer right foot       Allergies:  Lisinopril    Diagnosis:  1. 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 Diabetic ulcer of other part of right foot associated with diabetes mellitus due to underlying condition, with fat layer exposed (HCC)  E08.621 lidocaine (LMX) 4 % cream    L97.512 Wound cleansing and dressings Diabetic Ulcer Right;Plantar;Posterior Foot     Wound Procedure Treatment Diabetic Ulcer Right;Plantar;Posterior Foot           Assessment & Plan:  See wound orders.    Diabetic ulcer right foot to fat/rosa 2 ulcer:  The site is not macerated and the bottom of the foot is not swollen indicating the NWB on the foot.  Patient should continue local wound care, debridement and NWB.  After his wife's recovery we have plans to place a TCC or operate to help promote healing as well.    Subjective:   The patient was sick this week and was off the foot more than ever.  He lost 10 pounds due to his illness and his BS remains low.  He is still congested.        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HCC)    Hypertension    Hypercholesteremia    Hammer toe of right foot    Stasis dermatitis of both legs    Diabetic peripheral neuropathy (HCC)    Gout    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HCC)    Obesity with body mass index 30 or greater    Type 2 diabetes mellitus (HCC)    Retroperitoneal hematoma     Mesenteric lymphadenopathy    Acute blood loss anemia    Heart murmur    GI bleed    Pleural effusion    Chronic venous hypertension (idiopathic) with ulcer of right lower extremity (HCC)    Rash    Stage 2 chronic kidney disease    Hypertensive kidney disease with stage 2 chronic kidney disease    Other proteinuria    Obesity, morbid (HCC)    Follicular lymphoma grade I of lymph nodes of multiple sites (HCC)    Vitamin D deficiency    Stage 3a chronic kidney disease (HCC)    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 (HCC)    Ectatic thoracic aorta (HCC)     Past Medical History:   Diagnosis Date    Arthritis 2010's    Occasionally flares up    Cancer (HCC) May 2021    Still investigating    Chronic kidney disease     Diabetes mellitus (HCC)     Follicular lymphoma (HCC)     GERD (gastroesophageal reflux disease) June 2021    Noticed in an Endoscopy    Heart murmur May 2020    High cholesterol     Hypertension     Kidney stone 1980's    Have had since 1980's    Obesity Since Childhood     Past Surgical History:   Procedure Laterality Date    BUNIONECTOMY Right 11/24/2020    Procedure: RIGHT HAV CORRECTION,;  Surgeon: Niranjan Child DPM;  Location: [facility];  Service: Podiatry    COLONOSCOPY      INCISION AND DRAINAGE OF WOUND Right 10/05/2016    Procedure: INCISION AND DRAINAGE (I&D) EXTREMITY;  Surgeon: Niranjan Child DPM;  Location: [facility];  Service:     IR BIOPSY LYMPH NODE  07/08/2021    IR EMBOLIZATION (SPECIFY VESSEL OR SITE)  07/08/2021    IR PORT PLACEMENT  9/1/2022    PILONIDAL CYST EXCISION      TX CORRECTION HAMMERTOE Right 02/19/2019    Procedure: THIRD HAMMER TOE CORRECTION;  Surgeon: Niranjan Child DPM;  Location: [facility];  Service: Podiatry    TX RMVL MATEO CTR VAD W/SUBQ PORT/ CTR/PRPH INSJ N/A 3/14/2023    Procedure: REMOVAL VENOUS PORT (PORT-A-CATH)IR;  Surgeon: Avelino Vázquez DO;  [facility];  Service: Interventional Radiology    TOE OSTEOTOMY Right 03/14/2017    Procedure: HAMMERTOE  CORRECTION R 2 ;  Surgeon: Niranjan Child DPM;  [facility];  Service:     TOE OSTEOTOMY Left 11/24/2020    Procedure: LEFT HT CORRECTION TOE;  Surgeon: Niranjan Child DPM;  [facility];  Service: Podiatry    TONSILLECTOMY  1963     Social History     Socioeconomic History    Marital status: /Civil Union     Spouse name: None    Number of children: None    Years of education: None    Highest education level: None   Occupational History    None   Tobacco Use    Smoking status: Never    Smokeless tobacco: Never    Tobacco comments:     Never smoked but exposed to second hand smoke from birth until 1980's   Vaping Use    Vaping status: Never Used   Substance and Sexual Activity    Alcohol use: Never    Drug use: Never    Sexual activity: Not Currently     Partners: Female     Birth control/protection: Abstinence   Other Topics Concern    None   Social History Narrative    None     Social Determinants of Health     Financial Resource Strain: Not on file   Food Insecurity: No Food Insecurity (6/16/2024)    Hunger Vital Sign     Worried About Running Out of Food in the Last Year: Never true     Ran Out of Food in the Last Year: Never true   Transportation Needs: No Transportation Needs (6/16/2024)    PRAPARE - Transportation     Lack of Transportation (Medical): No     Lack of Transportation (Non-Medical): No   Physical Activity: Not on file   Stress: Not on file   Social Connections: Not on file   Intimate Partner Violence: Not on file   Housing Stability: Low Risk  (6/16/2024)    Housing Stability Vital Sign     Unable to Pay for Housing in the Last Year: No     Number of Times Moved in the Last Year: 0     Homeless in the Last Year: No        Current Outpatient Medications:     allopurinol (ZYLOPRIM) 300 mg tablet, Take 1 tablet (300 mg total) by mouth daily, Disp: 90 tablet, Rfl: 1    amLODIPine (NORVASC) 10 mg tablet, Take 1 tablet (10 mg total) by mouth daily, Disp: 90 tablet, Rfl: 1    aspirin  81 mg chewable tablet, Chew 81 mg daily, Disp: , Rfl:     Cholecalciferol 100 MCG (4000 UT) CAPS, Take 1 capsule (4,000 Units total) by mouth in the morning, Disp: , Rfl:     Empagliflozin (Jardiance) 25 MG TABS, Take 1 tablet (25 mg total) by mouth every morning, Disp: 90 tablet, Rfl: 2    hydroCHLOROthiazide 25 mg tablet, Take 1 tablet (25 mg total) by mouth daily, Disp: 90 tablet, Rfl: 1    losartan (COZAAR) 100 MG tablet, Take 1 tablet (100 mg total) by mouth daily, Disp: 90 tablet, Rfl: 1    metFORMIN (GLUCOPHAGE) 500 mg tablet, TAKE 2 TABLETS BY MOUTH TWICE A DAY WITH FOOD, Disp: 360 tablet, Rfl: 1    metoprolol succinate (TOPROL-XL) 100 mg 24 hr tablet, Take 1 tablet (100 mg total) by mouth every evening, Disp: 90 tablet, Rfl: 1    Multiple Vitamins-Minerals (Centrum Silver 50+Men) TABS, , Disp: , Rfl:     rosuvastatin (CRESTOR) 40 MG tablet, Take 1 tablet (40 mg total) by mouth every evening, Disp: 90 tablet, Rfl: 1  No current facility-administered medications for this visit.  Family History   Problem Relation Age of Onset    Cancer Father         Leukemia      Review of Systems   Constitutional:  Negative for chills and fever.         Objective:  /62   Pulse 77   Temp (!) 97.1 °F (36.2 °C)   Resp 18     Physical Exam  Neurological:      Mental Status: He is alert.             Wound 09/01/22 Incision Chest Anterior;Right (Active)       Wound 03/14/23 Chest Right (Active)       Wound 09/07/23 Diabetic Ulcer Foot Right;Plantar;Posterior (Active)   Enter Singh score: Singh Grade 1: Partial or full-thickness ulcer (superficial) 06/20/24 1355   Wound Image   06/20/24 1406   Wound Description Pink;Yellow;White 06/20/24 1355   Delisa-wound Assessment Callus 06/20/24 1355   Wound Length (cm) 1.4 cm 06/20/24 1355   Wound Width (cm) 2 cm 06/20/24 1355   Wound Depth (cm) 0.1 cm 06/20/24 1355   Wound Surface Area (cm^2) 2.8 cm^2 06/20/24 1355   Wound Volume (cm^3) 0.28 cm^3 06/20/24 1355   Calculated Wound  Volume (cm^3) 0.28 cm^3 06/20/24 1355   Change in Wound Size % 72 06/20/24 1355   Tunneling 1 in depth located at 1-12 o'clock 10/05/23 1348   Number of underminings 1 06/13/24 1355   Undermining 1 0.6 06/13/24 1355   Undermining 1 is depth extending from 6-12 Deepest at 9 06/13/24 1355   Drainage Amount Moderate 06/20/24 1355   Drainage Description Serosanguineous 06/20/24 1355   Non-staged Wound Description Full thickness 06/20/24 1355   Dressing Status Intact 06/20/24 1355                         Debridement   Wound 09/07/23 Diabetic Ulcer Foot Right;Plantar;Posterior    Universal Protocol:  Consent: Verbal consent obtained.  Consent given by: patient  Patient understanding: patient states understanding of the procedure being performed  Patient identity confirmed: verbally with patient    Debridement Details  Performed by: physician  Debridement type: surgical  Level of debridement: subcutaneous tissue  Pain control: lidocaine 1%      Post-debridement measurements  Length (cm): 1.4  Width (cm): 2.2  Depth (cm): 0.2  Percent debrided: 100%  Surface Area (cm^2): 3.08  Area Debrided (cm^2): 3.08  Volume (cm^3): 0.62    Tissue and other material debrided: subcutaneous tissue  Devitalized tissue debrided: callus and slough  Instrument(s) utilized: curette  Technique utilized: nonexcisionalBleeding: medium  Hemostasis obtained with: pressure and silver nitrate  Procedural pain (0-10): insensate  Post-procedural pain: insensate   Response to treatment: procedure was tolerated well                 Wound Instructions:  Orders Placed This Encounter   Procedures    Wound cleansing and dressings Diabetic Ulcer Right;Plantar;Posterior Foot     Wound cleansing and dressings Diabetic Ulcer Right;Plantar;Posterior Foot       RIGHT FOOT WOUND      Wash your hands with soap and water. Remove old dressing, discard into plastic bag and place in trash. Cleanse the wound with Dakins Solution prior to applying a clean dressing. Do not  "use tissue or cotton balls. Do not scrub the wound. Pat dry using gauze.      Shower: No      Apply Skin Prep to skin surrounding wound.   Apply Silver Alginate to the open wound.   Cover with Gauze and ABD.   Secure with Julisa and Tape.      Change dressing every day    Keep at weight off of right foot     Standing Status:   Future     Standing Expiration Date:   7/4/2024    Wound Procedure Treatment Diabetic Ulcer Right;Plantar;Posterior Foot     This order was created via procedure documentation    Debridement     This order was created via procedure documentation         Susie Luu DPM      Portions of the record may have been created with voice recognition software. Occasional wrong word or \"sound a like\" substitutions may have occurred due to the inherent limitations of voice recognition software. Read the chart carefully and recognize, using context, where substitutions have occurred.     "

## 2024-07-02 DIAGNOSIS — N18.30 TYPE 2 DIABETES MELLITUS WITH STAGE 3 CHRONIC KIDNEY DISEASE, UNSPECIFIED WHETHER LONG TERM INSULIN USE, UNSPECIFIED WHETHER STAGE 3A OR 3B CKD (HCC): ICD-10-CM

## 2024-07-02 DIAGNOSIS — E11.22 TYPE 2 DIABETES MELLITUS WITH STAGE 3 CHRONIC KIDNEY DISEASE, UNSPECIFIED WHETHER LONG TERM INSULIN USE, UNSPECIFIED WHETHER STAGE 3A OR 3B CKD (HCC): ICD-10-CM

## 2024-07-02 DIAGNOSIS — N18.31 STAGE 3A CHRONIC KIDNEY DISEASE (HCC): ICD-10-CM

## 2024-07-02 DIAGNOSIS — R80.8 OTHER PROTEINURIA: ICD-10-CM

## 2024-07-02 RX ORDER — EMPAGLIFLOZIN 25 MG/1
25 TABLET, FILM COATED ORAL EVERY MORNING
Qty: 90 TABLET | Refills: 1 | Status: SHIPPED | OUTPATIENT
Start: 2024-07-02

## 2024-07-05 ENCOUNTER — APPOINTMENT (OUTPATIENT)
Dept: LAB | Facility: HOSPITAL | Age: 69
End: 2024-07-05
Payer: MEDICARE

## 2024-07-05 ENCOUNTER — HOSPITAL ENCOUNTER (OUTPATIENT)
Dept: NON INVASIVE DIAGNOSTICS | Facility: HOSPITAL | Age: 69
Discharge: HOME | End: 2024-07-05
Payer: MEDICARE

## 2024-07-05 VITALS
BODY MASS INDEX: 36.84 KG/M2 | HEART RATE: 77 BPM | SYSTOLIC BLOOD PRESSURE: 124 MMHG | WEIGHT: 278 LBS | DIASTOLIC BLOOD PRESSURE: 62 MMHG | HEIGHT: 73 IN

## 2024-07-05 DIAGNOSIS — I77.810 ECTATIC THORACIC AORTA (HCC): ICD-10-CM

## 2024-07-05 DIAGNOSIS — E13.9 DIABETES 1.5, MANAGED AS TYPE 2 (HCC): ICD-10-CM

## 2024-07-05 LAB
AORTIC ROOT: 3.4 CM
AORTIC VALVE MEAN VELOCITY: 25.7 M/S
APICAL FOUR CHAMBER EJECTION FRACTION: 62 %
ASCENDING AORTA: 4.5 CM
AV AREA BY CONTINUOUS VTI: 1 CM2
AV AREA PEAK VELOCITY: 1 CM2
AV LVOT MEAN GRADIENT: 4 MMHG
AV LVOT PEAK GRADIENT: 6 MMHG
AV MEAN GRADIENT: 30 MMHG
AV PEAK GRADIENT: 46 MMHG
AV VALVE AREA: 0.99 CM2
BSA FOR ECHO PROCEDURE: 2.48 M2
DOP CALC AO VTI: 71.8 CM
DOP CALC LVOT AREA: 2.83
DOP CALC LVOT DIAMETER: 1.9 CM
DOP CALC LVOT PEAK VEL VTI: 25.2 CM
DOP CALC LVOT PEAK VEL: 1.22 M/S
DOP CALC LVOT STROKE INDEX: 29.1 ML/M2
DOP CALC LVOT STROKE VOLUME: 71.41
E WAVE DECELERATION TIME: 255 MS
E/A RATIO: 0.64
EST. AVERAGE GLUCOSE BLD GHB EST-MCNC: 148 MG/DL
FRACTIONAL SHORTENING: 34 (ref 28–44)
HBA1C MFR BLD: 6.8 %
INTERVENTRICULAR SEPTUM IN DIASTOLE (PARASTERNAL SHORT AXIS VIEW): 1.7 CM
INTERVENTRICULAR SEPTUM: 1.7 CM (ref 0.6–1.1)
LA/AORTA RATIO 2D: 1.5
LAAS-AP2: 23.7 CM2
LAAS-AP4: 17.3 CM2
LEFT ATRIUM SIZE: 5.1 CM
LEFT ATRIUM VOLUME (MOD BIPLANE): 63 ML
LEFT ATRIUM VOLUME INDEX (MOD BIPLANE): 25.5 ML/M2
LEFT INTERNAL DIMENSION IN SYSTOLE: 3.1 CM (ref 2.1–4)
LEFT VENTRICULAR INTERNAL DIMENSION IN DIASTOLE: 4.7 CM (ref 3.5–6)
LEFT VENTRICULAR POSTERIOR WALL IN END DIASTOLE: 1.5 CM
LEFT VENTRICULAR STROKE VOLUME: 64 ML
LVSV (TEICH): 64 ML
MV E'TISSUE VEL-SEP: 7 CM/S
MV PEAK A VEL: 1.49 M/S
MV PEAK E VEL: 96 CM/S
MV STENOSIS PRESSURE HALF TIME: 75 MS
MV VALVE AREA P 1/2 METHOD: 2.93
RIGHT VENTRICLE ID DIMENSION: 4.4 CM
SL CV LV EF: 62
SL CV PED ECHO LEFT VENTRICLE DIASTOLIC VOLUME (MOD BIPLANE) 2D: 103 ML
SL CV PED ECHO LEFT VENTRICLE SYSTOLIC VOLUME (MOD BIPLANE) 2D: 39 ML
TRICUSPID ANNULAR PLANE SYSTOLIC EXCURSION: 2.5 CM

## 2024-07-05 PROCEDURE — 36415 COLL VENOUS BLD VENIPUNCTURE: CPT

## 2024-07-05 PROCEDURE — 93306 TTE W/DOPPLER COMPLETE: CPT

## 2024-07-05 PROCEDURE — 93306 TTE W/DOPPLER COMPLETE: CPT | Performed by: INTERNAL MEDICINE

## 2024-07-05 PROCEDURE — 83036 HEMOGLOBIN GLYCOSYLATED A1C: CPT

## 2024-07-11 ENCOUNTER — OFFICE VISIT (OUTPATIENT)
Dept: WOUND CARE | Facility: CLINIC | Age: 69
End: 2024-07-11
Payer: MEDICARE

## 2024-07-11 ENCOUNTER — HOSPITAL ENCOUNTER (EMERGENCY)
Facility: HOSPITAL | Age: 69
Discharge: HOME | End: 2024-07-11
Attending: EMERGENCY MEDICINE
Payer: MEDICARE

## 2024-07-11 ENCOUNTER — APPOINTMENT (EMERGENCY)
Dept: RADIOLOGY | Facility: HOSPITAL | Age: 69
End: 2024-07-11
Payer: MEDICARE

## 2024-07-11 VITALS
OXYGEN SATURATION: 96 % | HEART RATE: 72 BPM | TEMPERATURE: 97.9 F | SYSTOLIC BLOOD PRESSURE: 99 MMHG | DIASTOLIC BLOOD PRESSURE: 56 MMHG | RESPIRATION RATE: 15 BRPM

## 2024-07-11 VITALS
DIASTOLIC BLOOD PRESSURE: 63 MMHG | SYSTOLIC BLOOD PRESSURE: 134 MMHG | HEART RATE: 86 BPM | RESPIRATION RATE: 18 BRPM | TEMPERATURE: 100.3 F

## 2024-07-11 DIAGNOSIS — E08.621 DIABETIC ULCER OF OTHER PART OF RIGHT FOOT ASSOCIATED WITH DIABETES MELLITUS DUE TO UNDERLYING CONDITION, WITH FAT LAYER EXPOSED (HCC): Primary | ICD-10-CM

## 2024-07-11 DIAGNOSIS — M25.511 RIGHT SHOULDER PAIN: Primary | ICD-10-CM

## 2024-07-11 DIAGNOSIS — N17.9 AKI (ACUTE KIDNEY INJURY) (HCC): ICD-10-CM

## 2024-07-11 DIAGNOSIS — L97.512 DIABETIC ULCER OF OTHER PART OF RIGHT FOOT ASSOCIATED WITH DIABETES MELLITUS DUE TO UNDERLYING CONDITION, WITH FAT LAYER EXPOSED (HCC): Primary | ICD-10-CM

## 2024-07-11 LAB
2HR DELTA HS TROPONIN: 1 NG/L
ALBUMIN SERPL BCG-MCNC: 3.9 G/DL (ref 3.5–5)
ALP SERPL-CCNC: 75 U/L (ref 34–104)
ALT SERPL W P-5'-P-CCNC: 51 U/L (ref 7–52)
ANION GAP SERPL CALCULATED.3IONS-SCNC: 11 MMOL/L (ref 4–13)
AST SERPL W P-5'-P-CCNC: 56 U/L (ref 13–39)
BASOPHILS # BLD AUTO: 0.06 THOUSANDS/ÂΜL (ref 0–0.1)
BASOPHILS NFR BLD AUTO: 1 % (ref 0–1)
BILIRUB SERPL-MCNC: 0.39 MG/DL (ref 0.2–1)
BUN SERPL-MCNC: 53 MG/DL (ref 5–25)
CALCIUM SERPL-MCNC: 10.1 MG/DL (ref 8.4–10.2)
CARDIAC TROPONIN I PNL SERPL HS: 10 NG/L
CARDIAC TROPONIN I PNL SERPL HS: 11 NG/L
CHLORIDE SERPL-SCNC: 97 MMOL/L (ref 96–108)
CO2 SERPL-SCNC: 28 MMOL/L (ref 21–32)
CREAT SERPL-MCNC: 1.87 MG/DL (ref 0.6–1.3)
EOSINOPHIL # BLD AUTO: 0.33 THOUSAND/ÂΜL (ref 0–0.61)
EOSINOPHIL NFR BLD AUTO: 3 % (ref 0–6)
ERYTHROCYTE [DISTWIDTH] IN BLOOD BY AUTOMATED COUNT: 17 % (ref 11.6–15.1)
GFR SERPL CREATININE-BSD FRML MDRD: 36 ML/MIN/1.73SQ M
GLUCOSE SERPL-MCNC: 124 MG/DL (ref 65–140)
HCT VFR BLD AUTO: 41.2 % (ref 36.5–49.3)
HGB BLD-MCNC: 13.1 G/DL (ref 12–17)
IMM GRANULOCYTES # BLD AUTO: 0.05 THOUSAND/UL (ref 0–0.2)
IMM GRANULOCYTES NFR BLD AUTO: 0 % (ref 0–2)
LYMPHOCYTES # BLD AUTO: 0.93 THOUSANDS/ÂΜL (ref 0.6–4.47)
LYMPHOCYTES NFR BLD AUTO: 8 % (ref 14–44)
MCH RBC QN AUTO: 26.1 PG (ref 26.8–34.3)
MCHC RBC AUTO-ENTMCNC: 31.8 G/DL (ref 31.4–37.4)
MCV RBC AUTO: 82 FL (ref 82–98)
MONOCYTES # BLD AUTO: 1.42 THOUSAND/ÂΜL (ref 0.17–1.22)
MONOCYTES NFR BLD AUTO: 12 % (ref 4–12)
NEUTROPHILS # BLD AUTO: 9.51 THOUSANDS/ÂΜL (ref 1.85–7.62)
NEUTS SEG NFR BLD AUTO: 76 % (ref 43–75)
NRBC BLD AUTO-RTO: 0 /100 WBCS
PLATELET # BLD AUTO: 185 THOUSANDS/UL (ref 149–390)
PMV BLD AUTO: 10.2 FL (ref 8.9–12.7)
POTASSIUM SERPL-SCNC: 4.7 MMOL/L (ref 3.5–5.3)
PROT SERPL-MCNC: 7.7 G/DL (ref 6.4–8.4)
RBC # BLD AUTO: 5.01 MILLION/UL (ref 3.88–5.62)
SODIUM SERPL-SCNC: 136 MMOL/L (ref 135–147)
WBC # BLD AUTO: 12.3 THOUSAND/UL (ref 4.31–10.16)

## 2024-07-11 PROCEDURE — 84484 ASSAY OF TROPONIN QUANT: CPT | Performed by: EMERGENCY MEDICINE

## 2024-07-11 PROCEDURE — 11042 DBRDMT SUBQ TIS 1ST 20SQCM/<: CPT | Performed by: PODIATRIST

## 2024-07-11 PROCEDURE — 85025 COMPLETE CBC W/AUTO DIFF WBC: CPT | Performed by: EMERGENCY MEDICINE

## 2024-07-11 PROCEDURE — 99284 EMERGENCY DEPT VISIT MOD MDM: CPT | Performed by: EMERGENCY MEDICINE

## 2024-07-11 PROCEDURE — 99285 EMERGENCY DEPT VISIT HI MDM: CPT

## 2024-07-11 PROCEDURE — 93005 ELECTROCARDIOGRAM TRACING: CPT

## 2024-07-11 PROCEDURE — 96374 THER/PROPH/DIAG INJ IV PUSH: CPT

## 2024-07-11 PROCEDURE — 71046 X-RAY EXAM CHEST 2 VIEWS: CPT

## 2024-07-11 PROCEDURE — 80053 COMPREHEN METABOLIC PANEL: CPT | Performed by: EMERGENCY MEDICINE

## 2024-07-11 PROCEDURE — 96361 HYDRATE IV INFUSION ADD-ON: CPT

## 2024-07-11 PROCEDURE — 36415 COLL VENOUS BLD VENIPUNCTURE: CPT | Performed by: EMERGENCY MEDICINE

## 2024-07-11 RX ORDER — OXYCODONE HYDROCHLORIDE 5 MG/1
5 TABLET ORAL EVERY 6 HOURS PRN
Qty: 7 TABLET | Refills: 0 | Status: SHIPPED | OUTPATIENT
Start: 2024-07-11

## 2024-07-11 RX ORDER — OXYCODONE HYDROCHLORIDE 5 MG/1
5 TABLET ORAL ONCE
Status: COMPLETED | OUTPATIENT
Start: 2024-07-11 | End: 2024-07-11

## 2024-07-11 RX ORDER — LIDOCAINE 50 MG/G
1 PATCH TOPICAL ONCE
Status: DISCONTINUED | OUTPATIENT
Start: 2024-07-11 | End: 2024-07-11 | Stop reason: HOSPADM

## 2024-07-11 RX ORDER — METHOCARBAMOL 500 MG/1
500 TABLET, FILM COATED ORAL ONCE
Status: COMPLETED | OUTPATIENT
Start: 2024-07-11 | End: 2024-07-11

## 2024-07-11 RX ORDER — LIDOCAINE 50 MG/G
1 PATCH TOPICAL DAILY
Qty: 14 PATCH | Refills: 0 | Status: SHIPPED | OUTPATIENT
Start: 2024-07-11 | End: 2024-07-25

## 2024-07-11 RX ORDER — SODIUM CHLORIDE 9 MG/ML
3 INJECTION INTRAVENOUS
Status: DISCONTINUED | OUTPATIENT
Start: 2024-07-11 | End: 2024-07-11 | Stop reason: HOSPADM

## 2024-07-11 RX ORDER — KETOROLAC TROMETHAMINE 30 MG/ML
15 INJECTION, SOLUTION INTRAMUSCULAR; INTRAVENOUS ONCE
Status: COMPLETED | OUTPATIENT
Start: 2024-07-11 | End: 2024-07-11

## 2024-07-11 RX ORDER — LIDOCAINE 40 MG/G
CREAM TOPICAL ONCE
Status: COMPLETED | OUTPATIENT
Start: 2024-07-11 | End: 2024-07-11

## 2024-07-11 RX ORDER — METHOCARBAMOL 500 MG/1
500 TABLET, FILM COATED ORAL 2 TIMES DAILY PRN
Qty: 20 TABLET | Refills: 0 | Status: SHIPPED | OUTPATIENT
Start: 2024-07-11 | End: 2024-07-15 | Stop reason: ALTCHOICE

## 2024-07-11 RX ADMIN — LIDOCAINE: 40 CREAM TOPICAL at 13:47

## 2024-07-11 RX ADMIN — KETOROLAC TROMETHAMINE 15 MG: 30 INJECTION, SOLUTION INTRAMUSCULAR at 03:40

## 2024-07-11 RX ADMIN — OXYCODONE HYDROCHLORIDE 5 MG: 5 TABLET ORAL at 06:14

## 2024-07-11 RX ADMIN — LIDOCAINE 5% 1 PATCH: 700 PATCH TOPICAL at 05:20

## 2024-07-11 RX ADMIN — SODIUM CHLORIDE 1000 ML: 0.9 INJECTION, SOLUTION INTRAVENOUS at 04:49

## 2024-07-11 RX ADMIN — METHOCARBAMOL TABLETS 500 MG: 500 TABLET, COATED ORAL at 05:20

## 2024-07-11 NOTE — PATIENT INSTRUCTIONS
Orders Placed This Encounter   Procedures    Wound cleansing and dressings Diabetic Ulcer Right;Plantar;Posterior Foot     RIGHT FOOT WOUND     Wash your hands with soap and water. Remove old dressing, discard into plastic bag and place in trash. Cleanse the wound with Dakins Solution prior to applying a clean dressing. Do not use tissue or cotton balls. Do not scrub the wound. Pat dry using gauze.     Shower: No     Apply Skin Prep to skin surrounding wound.   Apply Silver Alginate to the open wound.   Cover with Gauze and ABD.   Secure with Julisa and Tape.     Change dressing every day.    Keep at weight off of right foot.     Standing Status:   Future     Standing Expiration Date:   7/18/2024

## 2024-07-11 NOTE — PROGRESS NOTES
Wound Procedure Treatment Diabetic Ulcer Right;Plantar;Posterior Foot    Performed by: Maryana Bocanegra RN  Authorized by: Susie Luu DPM    Associated wounds:   Wound 09/07/23 Diabetic Ulcer Foot Right;Plantar;Posterior  Applied to periwound:  Skin prep  Applied primary dressing:  Silver  Applied secondary dressing:  ABD and Gauze  Dressing secured with:  Julisa and Tape

## 2024-07-11 NOTE — PROGRESS NOTES
Patient ID: Augustus Coffman is a 68 y.o. male Date of Birth 1955       Chief Complaint   Patient presents with    Follow Up Wound Care Visit     RIGHT FOOT WOUND       Allergies:  Lisinopril    Diagnosis:  1. 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 Diabetic ulcer of other part of right foot associated with diabetes mellitus due to underlying condition, with fat layer exposed (HCC)  E08.621 lidocaine (LMX) 4 % cream    L97.512 Wound cleansing and dressings Diabetic Ulcer Right;Plantar;Posterior Foot     Wound Procedure Treatment Diabetic Ulcer Right;Plantar;Posterior Foot           Assessment & Plan:  See wound orders.    Patient will need to rest and stay off the foot.  We are still considering additional measures to heal the foot.  For now, patient is encouraged to visit urgent care/get Covid testing and/or call Dr. Lazo as a good resource to follow up for his fever and elevated white count as his foot is not the source..  Patient will continue local wound care and follow up one week.    Subjective:   This patient is seen for follow up of right foot ulcer.  He was in the ER last night and has right shoulder pain.  He is also very tired with fever and earache.  The patient was evaluated with a chest x-ray and for an MI.  He was sent home with pain medication and orthopedic follow up.  He is still very tired from the ER and still in pain.  He did manage to stay off the foot and was in bed quite a bit anyway.  He lost weight because his appetite was low.        The following portions of the patient's history were reviewed and updated as appropriate:   Patient Active Problem  List   Diagnosis    Diabetes 1.5, managed as type 2 (HCC)    Hypertension    Hypercholesteremia    Hammer toe of right foot    Stasis dermatitis of both legs    Diabetic peripheral neuropathy (HCC)    Gout    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HCC)    Obesity with body mass index 30 or greater    Type 2 diabetes mellitus (HCC)    Retroperitoneal hematoma    Mesenteric lymphadenopathy    Acute blood loss anemia    Heart murmur    GI bleed    Pleural effusion    Chronic venous hypertension (idiopathic) with ulcer of right lower extremity (HCC)    Rash    Stage 2 chronic kidney disease    Hypertensive kidney disease with stage 2 chronic kidney disease    Other proteinuria    Obesity, morbid (HCC)    Follicular lymphoma grade I of lymph nodes of multiple sites (HCC)    Vitamin D deficiency    Stage 3a chronic kidney disease (HCC)    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 (HCC)    Ectatic thoracic aorta (HCC)     Past Medical History:   Diagnosis Date    Arthritis 2010's    Occasionally flares up    Cancer (HCC) May 2021    Still investigating    Chronic kidney disease     Diabetes mellitus (HCC)     Follicular lymphoma (HCC)     GERD (gastroesophageal reflux disease) June 2021    Noticed in an Endoscopy    Heart murmur May 2020    High cholesterol     Hypertension     Kidney stone 1980's    Have had since 1980's    Obesity Since Childhood     Past Surgical History:   Procedure Laterality Date    BUNIONECTOMY Right 11/24/2020    Procedure: RIGHT HAV CORRECTION,;  Surgeon: Niranjan Child DPM;  Location: [facility];  Service: Podiatry    COLONOSCOPY      INCISION AND DRAINAGE OF WOUND Right 10/05/2016    Procedure: INCISION AND DRAINAGE (I&D) EXTREMITY;  Surgeon: Niranjan Child DPM;  Location: [facility];  Service:     IR BIOPSY LYMPH NODE  07/08/2021    IR EMBOLIZATION (SPECIFY VESSEL OR SITE)  07/08/2021    IR PORT PLACEMENT  9/1/2022    PILONIDAL CYST EXCISION      WI CORRECTION HAMMERTOE Right 02/19/2019    Procedure: THIRD HAMMER  TOE CORRECTION;  Surgeon: Niranjan Child DPM;  Location: [facility];  Service: Podiatry    [facility] RMVL MATEO CTR VAD W/SUBQ PORT/ CTR/PRPH INSJ N/A 3/14/2023    Procedure: REMOVAL VENOUS PORT (PORT-A-CATH)IR;  Surgeon: Avelino Vázquez DO;  Location[facility];  Service: Interventional Radiology    TOE OSTEOTOMY Right 03/14/2017    Procedure: HAMMERTOE CORRECTION R 2 ;  Surgeon: Niranjan Child DPM;  [facility];  Service:     TOE OSTEOTOMY Left 11/24/2020    Procedure: LEFT HT CORRECTION TOE;  Surgeon: Niranjan Child DPM;  Location: [facility];  Service: Podiatry    TONSILLECTOMY  1963     Social History     Socioeconomic History    Marital status: /Civil Union     Spouse name: None    Number of children: None    Years of education: None    Highest education level: None   Occupational History    None   Tobacco Use    Smoking status: Never    Smokeless tobacco: Never    Tobacco comments:     Never smoked but exposed to second hand smoke from birth until 1980's   Vaping Use    Vaping status: Never Used   Substance and Sexual Activity    Alcohol use: Never    Drug use: Never    Sexual activity: Not Currently     Partners: Female     Birth control/protection: Abstinence   Other Topics Concern    None   Social History Narrative    None     Social Determinants of Health     Financial Resource Strain: Not on file   Food Insecurity: No Food Insecurity (6/16/2024)    Hunger Vital Sign     Worried About Running Out of Food in the Last Year: Never true     Ran Out of Food in the Last Year: Never true   Transportation Needs: No Transportation Needs (6/16/2024)    PRAPARE - Transportation     Lack of Transportation (Medical): No     Lack of Transportation (Non-Medical): No   Physical Activity: Not on file   Stress: Not on file   Social Connections: Not on file   Intimate Partner Violence: Not on file   Housing Stability: Low Risk  (6/16/2024)    Housing Stability Vital Sign     Unable to Pay for Housing in the Last  Year: No     Number of Times Moved in the Last Year: 0     Homeless in the Last Year: No        Current Outpatient Medications:     allopurinol (ZYLOPRIM) 300 mg tablet, Take 1 tablet (300 mg total) by mouth daily, Disp: 90 tablet, Rfl: 1    amLODIPine (NORVASC) 10 mg tablet, Take 1 tablet (10 mg total) by mouth daily, Disp: 90 tablet, Rfl: 1    aspirin 81 mg chewable tablet, Chew 81 mg daily, Disp: , Rfl:     Cholecalciferol 100 MCG (4000 UT) CAPS, Take 1 capsule (4,000 Units total) by mouth in the morning, Disp: , Rfl:     Empagliflozin (Jardiance) 25 MG TABS, TAKE 1 TABLET BY MOUTH EVERY DAY IN THE MORNING, Disp: 90 tablet, Rfl: 1    hydroCHLOROthiazide 25 mg tablet, Take 1 tablet (25 mg total) by mouth daily, Disp: 90 tablet, Rfl: 1    lidocaine (Lidoderm) 5 %, Apply 1 patch topically over 12 hours daily for 14 days Remove & Discard patch within 12 hours or as directed by MD, Disp: 14 patch, Rfl: 0    losartan (COZAAR) 100 MG tablet, Take 1 tablet (100 mg total) by mouth daily, Disp: 90 tablet, Rfl: 1    metFORMIN (GLUCOPHAGE) 500 mg tablet, TAKE 2 TABLETS BY MOUTH TWICE A DAY WITH FOOD, Disp: 360 tablet, Rfl: 1    methocarbamol (ROBAXIN) 500 mg tablet, Take 1 tablet (500 mg total) by mouth 2 (two) times a day as needed for muscle spasms for up to 10 days, Disp: 20 tablet, Rfl: 0    metoprolol succinate (TOPROL-XL) 100 mg 24 hr tablet, Take 1 tablet (100 mg total) by mouth every evening, Disp: 90 tablet, Rfl: 1    Multiple Vitamins-Minerals (Centrum Silver 50+Men) TABS, , Disp: , Rfl:     oxyCODONE (Roxicodone) 5 immediate release tablet, Take 1 tablet (5 mg total) by mouth every 6 (six) hours as needed for moderate pain for up to 7 doses Max Daily Amount: 20 mg, Disp: 7 tablet, Rfl: 0    rosuvastatin (CRESTOR) 40 MG tablet, Take 1 tablet (40 mg total) by mouth every evening, Disp: 90 tablet, Rfl: 1  No current facility-administered medications for this visit.  Family History   Problem Relation Age of Onset     Cancer Father         Leukemia      Review of Systems   Constitutional:  Negative for chills and fever.         Objective:  /63   Pulse 86   Temp 100.3 °F (37.9 °C)   Resp 18     Physical Exam  Neurological:      Mental Status: He is alert.             Wound 09/01/22 Incision Chest Anterior;Right (Active)       Wound 03/14/23 Chest Right (Active)       Wound 09/07/23 Diabetic Ulcer Foot Right;Plantar;Posterior (Active)   Enter Singh score: Singh Grade 1: Partial or full-thickness ulcer (superficial) 07/11/24 1346   Wound Image   07/11/24 1359   Wound Description Pink;Yellow 07/11/24 1346   Delisa-wound Assessment Callus 07/11/24 1346   Wound Length (cm) 1.2 cm 07/11/24 1346   Wound Width (cm) 1.6 cm 07/11/24 1346   Wound Depth (cm) 0.1 cm 07/11/24 1346   Wound Surface Area (cm^2) 1.92 cm^2 07/11/24 1346   Wound Volume (cm^3) 0.192 cm^3 07/11/24 1346   Calculated Wound Volume (cm^3) 0.19 cm^3 07/11/24 1346   Change in Wound Size % 81 07/11/24 1346   Tunneling 1 in depth located at 1-12 o'clock 10/05/23 1348   Number of underminings 1 07/11/24 1346   Undermining 1 0.4 07/11/24 1346   Undermining 1 is depth extending from 7-11 07/11/24 1346   Drainage Amount Moderate 07/11/24 1346   Drainage Description Serosanguineous 07/11/24 1346   Non-staged Wound Description Full thickness 07/11/24 1346   Dressing Status Intact 07/11/24 1346                         Debridement   Wound 09/07/23 Diabetic Ulcer Foot Right;Plantar;Posterior    Universal Protocol:  Consent: Verbal consent obtained.  Consent given by: patient  Patient understanding: patient states understanding of the procedure being performed  Patient identity confirmed: verbally with patient    Debridement Details  Performed by: physician  Debridement type: surgical  Level of debridement: subcutaneous tissue      Post-debridement measurements  Length (cm): 1.4  Width (cm): 1.7  Depth (cm): 0.1  Percent debrided: 100%  Surface Area (cm^2): 2.38  Area Debrided  "(cm^2): 2.38  Volume (cm^3): 0.24    Tissue and other material debrided: subcutaneous tissue  Devitalized tissue debrided: slough  Instrument(s) utilized: curette  Technique utilized: nonexcisionalBleeding: small  Hemostasis obtained with: silver nitrate and pressure  Procedural pain (0-10): insensate  Post-procedural pain: insensate   Response to treatment: procedure was tolerated well                 Wound Instructions:  Orders Placed This Encounter   Procedures    Wound cleansing and dressings Diabetic Ulcer Right;Plantar;Posterior Foot     RIGHT FOOT WOUND     Wash your hands with soap and water. Remove old dressing, discard into plastic bag and place in trash. Cleanse the wound with Dakins Solution prior to applying a clean dressing. Do not use tissue or cotton balls. Do not scrub the wound. Pat dry using gauze.     Shower: No     Apply Skin Prep to skin surrounding wound.   Apply Silver Alginate to the open wound.   Cover with Gauze and ABD.   Secure with Julisa and Tape.     Change dressing every day.    Keep at weight off of right foot.     Standing Status:   Future     Standing Expiration Date:   7/18/2024    Wound Procedure Treatment Diabetic Ulcer Right;Plantar;Posterior Foot     This order was created via procedure documentation         Susie Luu DPM      Portions of the record may have been created with voice recognition software. Occasional wrong word or \"sound a like\" substitutions may have occurred due to the inherent limitations of voice recognition software. Read the chart carefully and recognize, using context, where substitutions have occurred.     "

## 2024-07-11 NOTE — ED PROVIDER NOTES
History  Chief Complaint   Patient presents with    Shoulder Pain     Pt arrives ambulatory states R sided shoulder pain since Sunday morning unrelieved by tylenol/motrin.     68-year-old male presents to the emergency room with chief complaint of posterior right shoulder pain that has been ongoing since awakening on Sunday.    Patient notes persistent pain despite treatment with multiple over-the-counter medications including acetaminophen and ibuprofen that he has been rotating.    Patient denies any inciting event including any trauma.  Patient does note it was there upon awakening.    Patient denies any chest pain.   Patient denies any neck pain.  Patient denies any numbness or tingling.  Patient denies any motor weakness.    Impression and plan: Right shoulder pain with a broad differential.  Patient's pain is more over the right trapezius which is exquisitely tender to palpation though there is no clear inciting event.  Patient does state that it occurred upon awakening raising concerns for this as the initial insult.   patient does not have any history of coronary artery disease though he does have multiple risk factors.  Considering this, will obtain cardiac evaluation as to the alternative etiology though considering the tenderness in the location more likely musculoskeletal in etiology.  Will obtain plain film chest imaging to evaluate for alternative etiologies including pneumothorax as there is no pain over the shoulder itself that would warrant dedicated imaging.  Discussed symptomatic management with anti-inflammatory medications and will initiate treatment with a dose of ketorolac in the emergency room as he denies any history of renal disease.  Discussed risk and benefits of this medication.  Will prescribe course of muscle relaxers though I discussed the risks and benefits of these medications in detail.  As patient is driving, he will take this upon arrival home.  Patient does have a history of  thoracic aneurysm though symptoms inconsistent with this is the etiology and he had recent imaging 1 week ago without change.  Will refer patient orthopedics assuming reassuring workup in the emergency room.        Prior to Admission Medications   Prescriptions Last Dose Informant Patient Reported? Taking?   Cholecalciferol 100 MCG (4000 UT) CAPS   No No   Sig: Take 1 capsule (4,000 Units total) by mouth in the morning   Empagliflozin (Jardiance) 25 MG TABS   No No   Sig: TAKE 1 TABLET BY MOUTH EVERY DAY IN THE MORNING   Multiple Vitamins-Minerals (Centrum Silver 50+Men) TABS  Self Yes No   allopurinol (ZYLOPRIM) 300 mg tablet  Self No No   Sig: Take 1 tablet (300 mg total) by mouth daily   amLODIPine (NORVASC) 10 mg tablet  Self No No   Sig: Take 1 tablet (10 mg total) by mouth daily   aspirin 81 mg chewable tablet  Self Yes No   Sig: Chew 81 mg daily   hydroCHLOROthiazide 25 mg tablet  Self No No   Sig: Take 1 tablet (25 mg total) by mouth daily   losartan (COZAAR) 100 MG tablet  Self No No   Sig: Take 1 tablet (100 mg total) by mouth daily   metFORMIN (GLUCOPHAGE) 500 mg tablet   No No   Sig: TAKE 2 TABLETS BY MOUTH TWICE A DAY WITH FOOD   metoprolol succinate (TOPROL-XL) 100 mg 24 hr tablet  Self No No   Sig: Take 1 tablet (100 mg total) by mouth every evening   rosuvastatin (CRESTOR) 40 MG tablet   No No   Sig: Take 1 tablet (40 mg total) by mouth every evening      Facility-Administered Medications: None       Past Medical History:   Diagnosis Date    Arthritis 2010's    Occasionally flares up    Cancer (HCC) May 2021    Still investigating    Chronic kidney disease     Diabetes mellitus (HCC)     Follicular lymphoma (HCC)     GERD (gastroesophageal reflux disease) June 2021    Noticed in an Endoscopy    Heart murmur May 2020    High cholesterol     Hypertension     Kidney stone 1980's    Have had since 1980's    Obesity Since Childhood       Past Surgical History:   Procedure Laterality Date    BUNIONECTOMY  Right 11/24/2020    Procedure: RIGHT HAV CORRECTION,;  Surgeon: Niranjan Child DPM;  [facility];  Service: Podiatry    COLONOSCOPY      INCISION AND DRAINAGE OF WOUND Right 10/05/2016    Procedure: INCISION AND DRAINAGE (I&D) EXTREMITY;  Surgeon: Niranjan Child DPM;  Location[facility];  Service:     IR BIOPSY LYMPH NODE  07/08/2021    IR EMBOLIZATION (SPECIFY VESSEL OR SITE)  07/08/2021    IR PORT PLACEMENT  9/1/2022    PILONIDAL CYST EXCISION      AR CORRECTION HAMMERTOE Right 02/19/2019    Procedure: THIRD HAMMER TOE CORRECTION;  Surgeon: Niranjan Child DPM;  [facility];  Service: Podiatry    AR RMVL MATEO CTR VAD W/SUBQ PORT/ CTR/PRPH INSJ N/A 3/14/2023    Procedure: REMOVAL VENOUS PORT (PORT-A-CATH)IR;  Surgeon: Avelino Vázquez DO;  [facility];  Service: Interventional Radiology    TOE OSTEOTOMY Right 03/14/2017    Procedure: HAMMERTOE CORRECTION R 2 ;  Surgeon: Niranjan Child DPM;  [facility];  Service:     TOE OSTEOTOMY Left 11/24/2020    Procedure: LEFT HT CORRECTION TOE;  Surgeon: Niranjan Child DPM;  [facility];  Service: Podiatry    TONSILLECTOMY  1963       Family History   Problem Relation Age of Onset    Cancer Father         Leukemia     I have reviewed and agree with the history as documented.    E-Cigarette/Vaping    E-Cigarette Use Never User      E-Cigarette/Vaping Substances    Nicotine No     THC No     CBD No     Flavoring No     Other No     Unknown No      Social History     Tobacco Use    Smoking status: Never    Smokeless tobacco: Never    Tobacco comments:     Never smoked but exposed to second hand smoke from birth until 1980's   Vaping Use    Vaping status: Never Used   Substance Use Topics    Alcohol use: Never    Drug use: Never       Review of Systems    Physical Exam  Physical Exam  Musculoskeletal:      Right shoulder: Tenderness present. No swelling, deformity, effusion, laceration or crepitus. Normal range of motion.      Left  shoulder: Normal.        Back:       Comments: Normal inspection of the right shoulder.  Normal range of motion.  Tenderness as noted over the area of the trapezius without bony tenderness over the joint itself.  Normal distal examination of the right arm.  2+ radial pulse with appropriate capillary refill.  Normal motor including okay, crosses finger, thumb to pinky.  Normal sensory in all dermatomes of the distal arm and hand.         Vital Signs  ED Triage Vitals   Temperature Pulse Respirations Blood Pressure SpO2   07/11/24 0100 07/11/24 0100 07/11/24 0100 07/11/24 0100 07/11/24 0100   97.9 °F (36.6 °C) 87 18 117/56 96 %      Temp Source Heart Rate Source Patient Position - Orthostatic VS BP Location FiO2 (%)   07/11/24 0100 07/11/24 0100 07/11/24 0100 07/11/24 0100 --   Temporal Monitor Sitting Left arm       Pain Score       07/11/24 0340       8           Vitals:    07/11/24 0100 07/11/24 0449 07/11/24 0600   BP: 117/56 98/56 99/56   Pulse: 87 73 72   Patient Position - Orthostatic VS: Sitting Lying          Visual Acuity      ED Medications  Medications   sodium chloride (PF) 0.9 % injection 3 mL (has no administration in time range)   lidocaine (LIDODERM) 5 % patch 1 patch (1 patch Topical Medication Applied 7/11/24 0520)   ketorolac (TORADOL) injection 15 mg (15 mg Intravenous Given 7/11/24 0340)   sodium chloride 0.9 % bolus 1,000 mL (0 mL Intravenous Stopped 7/11/24 0603)   methocarbamol (ROBAXIN) tablet 500 mg (500 mg Oral Given 7/11/24 0520)   oxyCODONE (ROXICODONE) IR tablet 5 mg (5 mg Oral Given 7/11/24 0614)       Diagnostic Studies  Results Reviewed       Procedure Component Value Units Date/Time    HS Troponin I 2hr [[id number]]  (Normal) Collected: 07/11/24 0521    Lab Status: Final result Specimen: Blood from Arm, Left Updated: 07/11/24 0610     hs TnI 2hr 11 ng/L      Delta 2hr hsTnI 1 ng/L     HS Troponin I 4hr [[id number]]     Lab Status: No result Specimen: Blood     HS Troponin 0hr (reflex  protocol) [[id number]]  (Normal) Collected: 07/11/24 0339    Lab Status: Final result Specimen: Blood from Arm, Left Updated: 07/11/24 0408     hs TnI 0hr 10 ng/L     Comprehensive metabolic panel [[id number]]  (Abnormal) Collected: 07/11/24 0339    Lab Status: Final result Specimen: Blood from Arm, Left Updated: 07/11/24 0403     Sodium 136 mmol/L      Potassium 4.7 mmol/L      Chloride 97 mmol/L      CO2 28 mmol/L      ANION GAP 11 mmol/L      BUN 53 mg/dL      Creatinine 1.87 mg/dL      Glucose 124 mg/dL      Calcium 10.1 mg/dL      AST 56 U/L      ALT 51 U/L      Alkaline Phosphatase 75 U/L      Total Protein 7.7 g/dL      Albumin 3.9 g/dL      Total Bilirubin 0.39 mg/dL      eGFR 36 ml/min/1.73sq m     Narrative:      National Kidney Disease Foundation guidelines for Chronic Kidney Disease (CKD):     Stage 1 with normal or high GFR (GFR > 90 mL/min/1.73 square meters)    Stage 2 Mild CKD (GFR = 60-89 mL/min/1.73 square meters)    Stage 3A Moderate CKD (GFR = 45-59 mL/min/1.73 square meters)    Stage 3B Moderate CKD (GFR = 30-44 mL/min/1.73 square meters)    Stage 4 Severe CKD (GFR = 15-29 mL/min/1.73 square meters)    Stage 5 End Stage CKD (GFR <15 mL/min/1.73 square meters)  Note: GFR calculation is accurate only with a steady state creatinine    CBC and differential [[id number]]  (Abnormal) Collected: 07/11/24 0339    Lab Status: Final result Specimen: Blood from Arm, Left Updated: 07/11/24 0346     WBC 12.30 Thousand/uL      RBC 5.01 Million/uL      Hemoglobin 13.1 g/dL      Hematocrit 41.2 %      MCV 82 fL      MCH 26.1 pg      MCHC 31.8 g/dL      RDW 17.0 %      MPV 10.2 fL      Platelets 185 Thousands/uL      nRBC 0 /100 WBCs      Segmented % 76 %      Immature Grans % 0 %      Lymphocytes % 8 %      Monocytes % 12 %      Eosinophils Relative 3 %      Basophils Relative 1 %      Absolute Neutrophils 9.51 Thousands/µL      Absolute Immature Grans 0.05 Thousand/uL      Absolute Lymphocytes 0.93 Thousands/µL       Absolute Monocytes 1.42 Thousand/µL      Eosinophils Absolute 0.33 Thousand/µL      Basophils Absolute 0.06 Thousands/µL                    X-ray chest 2 views   ED Interpretation by Carilto Oliver MD (07/11 0453)   No acute findings.                 Procedures  Procedures         ED Course  ED Course as of 07/11/24 0622   Thu Jul 11, 2024   0501 Patient laboratory evaluation demonstrates significant increase in his creatinine from his last testing in April.  I discussed with the patient this may be secondary to his excessive use of anti-inflammatory medications and told him to avoid use of NSAIDs until he follows with nephrology for reassessment.    Patient's initial laboratory evaluation otherwise is reassuring other than minimal elevation in AST.  Patient aware of his laboratory evaluations.    I discussed and emphasized the diagnostic concerning with the patient.  I discussed the need for follow-up with orthopedics and will provide this referral.  Patient is awaiting delta troponin.  Patient notes only modest improvement following treatment with ketorolac.    After discussion with the patient about risks and benefits will provide short course of methocarbamol along with a limited prescription of oxycodone which I discussed the risks of in detail with the patient including addiction.  Emphasized avoiding these medications with any activities.  Emphasized nonuse of narcotic pain medication unless absolutely required.  Unfortunately patient unable to take NSAIDs so limited further treatment but will prescribe Lidoderm.                                 SBIRT 22yo+      Flowsheet Row Most Recent Value   Initial Alcohol Screen: US AUDIT-C     1. How often do you have a drink containing alcohol? 0 Filed at: 07/11/2024 0311   2. How many drinks containing alcohol do you have on a typical day you are drinking?  0 Filed at: 07/11/2024 0311   Audit-C Score 0 Filed at: 07/11/2024 0311   MARVIN: How many times in the past  year have you...    Used an illegal drug or used a prescription medication for non-medical reasons? Never Filed at: 07/11/2024 0311                      Medical Decision Making  Amount and/or Complexity of Data Reviewed  Labs: ordered.  Radiology: ordered and independent interpretation performed.    Risk  Prescription drug management.                 Disposition  Final diagnoses:   Right shoulder pain   FINA (acute kidney injury) (HCC)     Time reflects when diagnosis was documented in both MDM as applicable and the Disposition within this note       Time User Action Codes Description Comment    7/11/2024  5:15 AM Carlito Oliver [M25.511] Right shoulder pain     7/11/2024  5:48 AM Carlito Oliver [N17.9] FINA (acute kidney injury) (HCC)           ED Disposition       ED Disposition   Discharge    Condition   Stable    Date/Time   Thu Jul 11, 2024 0515    Comment   Augustus Coffman discharge to home/self care.                   Follow-up Information       Follow up With Specialties Details Why Contact Info Additional Information    [facility] [place] [facility] Schedule an appointment as soon as possible for a visit  Follow-up and reassessment with orthopedics on your shoulder pain. [address] [facility] [address]  [facility] [address]  [phone] [facility] [place], [address] [place] [address] [address], [place], [place], [address]   [phone]    [facility] Emergency Medicine Go to  If symptoms worsen [address] [facility] [address]  [phone] [facility], [address] [place], [place], [address]    Gwen Lazo DO Family Medicine Schedule an appointment as soon as possible for a visit  Follow-up and reassessment.  Monitoring of your kidney function. [address]   [address]  [place]  [address]  [phone]               Patient's Medications   Discharge Prescriptions    LIDOCAINE (LIDODERM) 5 %    Apply 1 patch topically over 12 hours daily for 14 days Remove & Discard patch within 12 hours or as directed by MD       Start Date: 7/11/2024 End Date: 7/25/2024       Order Dose: 1 patch       Quantity: 14 patch    Refills: 0    METHOCARBAMOL (ROBAXIN) 500 MG TABLET    Take 1 tablet (500 mg total) by mouth 2 (two) times a day as needed for muscle spasms for up to 10 days       Start Date: 7/11/2024 End Date: 7/21/2024       Order Dose: 500 mg       Quantity: 20 tablet    Refills: 0    OXYCODONE (ROXICODONE) 5 IMMEDIATE RELEASE TABLET    Take 1 tablet (5 mg total) by mouth every 6 (six) hours as needed for moderate pain for up to 7 doses Max Daily Amount: 20 mg       Start Date: 7/11/2024 End Date: --       Order Dose: 5 mg       Quantity: 7 tablet    Refills: 0           PDMP Review         Value Time User    PDMP Reviewed  Yes 7/11/2024  5:15 AM Carlito Oliver MD            ED Provider  Electronically Signed by             Carlito Oliver MD  07/11/24 0622

## 2024-07-11 NOTE — DISCHARGE INSTRUCTIONS
As discussed, please avoid the use of all NSAIDs including ibuprofen.  Please follow closely with your primary care physician or nephrologist to monitor your kidney function.  Follow-up with orthopedics regarding your shoulder pain.   none

## 2024-07-12 ENCOUNTER — OFFICE VISIT (OUTPATIENT)
Dept: OBGYN CLINIC | Facility: CLINIC | Age: 69
End: 2024-07-12
Payer: MEDICARE

## 2024-07-12 ENCOUNTER — APPOINTMENT (OUTPATIENT)
Dept: RADIOLOGY | Facility: AMBULARY SURGERY CENTER | Age: 69
End: 2024-07-12
Attending: STUDENT IN AN ORGANIZED HEALTH CARE EDUCATION/TRAINING PROGRAM
Payer: MEDICARE

## 2024-07-12 VITALS
HEART RATE: 85 BPM | BODY MASS INDEX: 36.84 KG/M2 | SYSTOLIC BLOOD PRESSURE: 130 MMHG | RESPIRATION RATE: 17 BRPM | WEIGHT: 278 LBS | DIASTOLIC BLOOD PRESSURE: 74 MMHG | HEIGHT: 73 IN

## 2024-07-12 DIAGNOSIS — M25.511 RIGHT SHOULDER PAIN, UNSPECIFIED CHRONICITY: ICD-10-CM

## 2024-07-12 DIAGNOSIS — M62.830 SPASM OF RIGHT TRAPEZIUS MUSCLE: Primary | ICD-10-CM

## 2024-07-12 DIAGNOSIS — M25.511 RIGHT SHOULDER PAIN: ICD-10-CM

## 2024-07-12 LAB
ATRIAL RATE: 72 BPM
ATRIAL RATE: 73 BPM
ATRIAL RATE: 74 BPM
P AXIS: 21 DEGREES
P AXIS: 38 DEGREES
P AXIS: 38 DEGREES
PR INTERVAL: 212 MS
PR INTERVAL: 218 MS
PR INTERVAL: 218 MS
QRS AXIS: -10 DEGREES
QRS AXIS: -10 DEGREES
QRS AXIS: 0 DEGREES
QRSD INTERVAL: 90 MS
QRSD INTERVAL: 96 MS
QRSD INTERVAL: 98 MS
QT INTERVAL: 390 MS
QT INTERVAL: 398 MS
QT INTERVAL: 398 MS
QTC INTERVAL: 429 MS
QTC INTERVAL: 435 MS
QTC INTERVAL: 441 MS
T WAVE AXIS: -4 DEGREES
T WAVE AXIS: -6 DEGREES
T WAVE AXIS: 32 DEGREES
VENTRICULAR RATE: 72 BPM
VENTRICULAR RATE: 73 BPM
VENTRICULAR RATE: 74 BPM

## 2024-07-12 PROCEDURE — 99204 OFFICE O/P NEW MOD 45 MIN: CPT | Performed by: STUDENT IN AN ORGANIZED HEALTH CARE EDUCATION/TRAINING PROGRAM

## 2024-07-12 PROCEDURE — 93010 ELECTROCARDIOGRAM REPORT: CPT | Performed by: INTERNAL MEDICINE

## 2024-07-12 PROCEDURE — 73030 X-RAY EXAM OF SHOULDER: CPT

## 2024-07-12 NOTE — LETTER
July 12, 2024     Carlito Oliver MD  [address] [facility] [address]    Patient: Augustus Coffman   YOB: 1955   Date of Visit: 7/12/2024       Dear Dr. Oliver:    Thank you for referring Augustus Coffman to me for evaluation. Below are my notes for this consultation.    If you have questions, please do not hesitate to call me. I look forward to following your patient along with you.         Sincerely,        Ricardo Jacques, DO        CC: No Recipients    Ricardo Jacques DO  7/12/2024 11:14 AM  Sign when Signing Visit  Ortho Sports Medicine Shoulder New Patient Visit     Assesment:   68 y.o. male right upper trapezius muscle spasm.    Plan:  Recommend RICE therapy. Topical voltaren gel provided via Rx due to CKD. Discussed medrol dose pack but will avoid given his diabetes. Recommend heat and physical therapy. Follow up PRN    Conservative treatment:    PT for ROM and strengthening to shoulder, rotator cuff, scapular stabilizers.  OTC meds for pain  Prescribed topical Voltaren gel to use prn.      Imaging:    All imaging from today was reviewed by myself and explained to the patient.       Injection:    No Injection planned at this time.      Surgery:     No surgery is recommended at this point, continue with conservative treatment plan as noted.      Follow up:    No follow-ups on file.        Chief Complaint   Patient presents with   • Right Shoulder - Pain       History of Present Illness:    The patient is a 68 y.o., right hand dominant male  referred to me by Dr. Carlito Oliver MD. He states the pain began on 7/7/2024 while he was sleeping and woke up with pain.  He states that he tried over-the-counter pain medications for a few days with no relief.  He was seen in the emergency department on 7/11/2024 where he was prescribed lidocaine patches and oxycodone for pain.  Today he states that he is still having significant pain on the superior aspect of his shoulder near the  trapezius muscle.  He states that he has not been able to sleep and has had no appetite.  He states that he has slight numbness tingling that goes into his fingers, but he states that because he is diabetic he gets that intermittently.       Shoulder Surgical History:  None    Past Medical, Social and Family History:  Past Medical History:   Diagnosis Date   • Arthritis 2010's    Occasionally flares up   • Cancer (HCC) May 2021    Still investigating   • Chronic kidney disease    • Diabetes mellitus (HCC)    • Follicular lymphoma (HCC)    • GERD (gastroesophageal reflux disease) June 2021    Noticed in an Endoscopy   • Heart murmur May 2020   • High cholesterol    • Hypertension    • Kidney stone 1980's    Have had since 1980's   • Obesity Since Childhood     Past Surgical History:   Procedure Laterality Date   • BUNIONECTOMY Right 11/24/2020    Procedure: RIGHT HAV CORRECTION,;  Surgeon: Niranjan Child DPM;  [facility];  Service: Podiatry   • COLONOSCOPY     • INCISION AND DRAINAGE OF WOUND Right 10/05/2016    Procedure: INCISION AND DRAINAGE (I&D) EXTREMITY;  Surgeon: Niranjan Child DPM;  [facility];  Service:    • IR BIOPSY LYMPH NODE  07/08/2021   • IR EMBOLIZATION (SPECIFY VESSEL OR SITE)  07/08/2021   • IR PORT PLACEMENT  9/1/2022   • PILONIDAL CYST EXCISION     • NE CORRECTION HAMMERTOE Right 02/19/2019    Procedure: THIRD HAMMER TOE CORRECTION;  Surgeon: Niranjan Child DPM;  [facility];  Service: Podiatry   • NE RMVL MATEO CTR VAD W/SUBQ PORT/ CTR/PRPH INSJ N/A 3/14/2023    Procedure: REMOVAL VENOUS PORT (PORT-A-CATH)IR;  Surgeon: Avelino Vázquez DO;  [facility];  Service: Interventional Radiology   • TOE OSTEOTOMY Right 03/14/2017    Procedure: HAMMERTOE CORRECTION R 2 ;  Surgeon: Niranjan Child DPM;  [facility];  Service:    • TOE OSTEOTOMY Left 11/24/2020    Procedure: LEFT HT CORRECTION TOE;  Surgeon: Niranjan Child DPM;  [facility];  Service:  Podiatry   • TONSILLECTOMY  1963     Allergies   Allergen Reactions   • Lisinopril Cough     Current Outpatient Medications on File Prior to Visit   Medication Sig Dispense Refill   • allopurinol (ZYLOPRIM) 300 mg tablet Take 1 tablet (300 mg total) by mouth daily 90 tablet 1   • amLODIPine (NORVASC) 10 mg tablet Take 1 tablet (10 mg total) by mouth daily 90 tablet 1   • aspirin 81 mg chewable tablet Chew 81 mg daily     • Cholecalciferol 100 MCG (4000 UT) CAPS Take 1 capsule (4,000 Units total) by mouth in the morning     • Empagliflozin (Jardiance) 25 MG TABS TAKE 1 TABLET BY MOUTH EVERY DAY IN THE MORNING 90 tablet 1   • hydroCHLOROthiazide 25 mg tablet Take 1 tablet (25 mg total) by mouth daily 90 tablet 1   • lidocaine (Lidoderm) 5 % Apply 1 patch topically over 12 hours daily for 14 days Remove & Discard patch within 12 hours or as directed by MD 14 patch 0   • losartan (COZAAR) 100 MG tablet Take 1 tablet (100 mg total) by mouth daily 90 tablet 1   • metFORMIN (GLUCOPHAGE) 500 mg tablet TAKE 2 TABLETS BY MOUTH TWICE A DAY WITH FOOD 360 tablet 1   • methocarbamol (ROBAXIN) 500 mg tablet Take 1 tablet (500 mg total) by mouth 2 (two) times a day as needed for muscle spasms for up to 10 days 20 tablet 0   • metoprolol succinate (TOPROL-XL) 100 mg 24 hr tablet Take 1 tablet (100 mg total) by mouth every evening 90 tablet 1   • Multiple Vitamins-Minerals (Centrum Silver 50+Men) TABS      • oxyCODONE (Roxicodone) 5 immediate release tablet Take 1 tablet (5 mg total) by mouth every 6 (six) hours as needed for moderate pain for up to 7 doses Max Daily Amount: 20 mg 7 tablet 0   • rosuvastatin (CRESTOR) 40 MG tablet Take 1 tablet (40 mg total) by mouth every evening 90 tablet 1     Current Facility-Administered Medications on File Prior to Visit   Medication Dose Route Frequency Provider Last Rate Last Admin   • [COMPLETED] lidocaine (LMX) 4 % cream   Topical Once Susie Luu DPM   Given at 07/11/24 5195      Social History     Socioeconomic History   • Marital status: /Civil Union     Spouse name: Not on file   • Number of children: Not on file   • Years of education: Not on file   • Highest education level: Not on file   Occupational History   • Not on file   Tobacco Use   • Smoking status: Never   • Smokeless tobacco: Never   • Tobacco comments:     Never smoked but exposed to second hand smoke from birth until 1980's   Vaping Use   • Vaping status: Never Used   Substance and Sexual Activity   • Alcohol use: Never   • Drug use: Never   • Sexual activity: Not Currently     Partners: Female     Birth control/protection: Abstinence   Other Topics Concern   • Not on file   Social History Narrative   • Not on file     Social Determinants of Health     Financial Resource Strain: Not on file   Food Insecurity: No Food Insecurity (6/16/2024)    Hunger Vital Sign    • Worried About Running Out of Food in the Last Year: Never true    • Ran Out of Food in the Last Year: Never true   Transportation Needs: No Transportation Needs (6/16/2024)    PRAPARE - Transportation    • Lack of Transportation (Medical): No    • Lack of Transportation (Non-Medical): No   Physical Activity: Not on file   Stress: Not on file   Social Connections: Not on file   Intimate Partner Violence: Not on file   Housing Stability: Low Risk  (6/16/2024)    Housing Stability Vital Sign    • Unable to Pay for Housing in the Last Year: No    • Number of Times Moved in the Last Year: 0    • Homeless in the Last Year: No         I have reviewed the past medical, surgical, social and family history, medications and allergies as documented in the EMR.    Review of systems: ROS is negative other than that noted in the HPI.  Constitutional: Negative for fatigue and fever.   HENT: Negative for sore throat.    Respiratory: Negative for shortness of breath.    Cardiovascular: Negative for chest pain.   Gastrointestinal: Negative for abdominal pain.  "  Endocrine: Negative for cold intolerance and heat intolerance.   Genitourinary: Negative for flank pain.   Musculoskeletal: Negative for back pain.   Skin: Negative for rash.   Allergic/Immunologic: Negative for immunocompromised state.   Neurological: Negative for dizziness.   Psychiatric/Behavioral: Negative for agitation.      Physical Exam:    Blood pressure 130/74, pulse 85, resp. rate 17, height 6' 1\" (1.854 m), weight 126 kg (278 lb).    General/Constitutional: NAD, well developed, well nourished  HENT: Normocephalic, atraumatic  CV: Intact distal pulses, regular rate  Resp: No respiratory distress or labored breathing  Lymphatic: No lymphadenopathy palpated  Neuro: Alert and Oriented x 3, no focal deficits  Psych: Normal mood, normal affect, normal judgement, normal behavior  Skin: Warm, dry, no rashes, no erythema         Visual inspection of the shoulder demonstrates kyphosis of the neck and c spine  No evidence of scapular dyskinesia or atrophy.  No scapular winging  Active and passive range of motion were all WNL.  No tenderness to palpation at the distal clavicle, AC joint, acromion, or scapular spine  Significant tenderness to palpation to trapezius   Painful neck ROM with rotation and sidebending to the Right side  Paraspinal tenderness to palpation  Negative Spurling's test      UE NV Exam: +2 Radial pulses bilaterally  Sensation intact to light touch C5-T1 bilaterally, Radial/median/ulnar nerve motor intact      Bilateral elbow, wrist, and and forearm ROM full, painless with passive ROM, no ttp or crepitance throughout extremities below shoulder joint    Cervical ROM revealed slight pain and tightness with left rotation.      Shoulder Imaging    X-rays of the right shoulder were reviewed, which demonstrate mild osteoarthritis without acute abnormality. Small calcification at supraspinatus insertion.  There is no accompanying radiology report.        Scribe Attestation      I,:  Sylvain Manley am " acting as a scribe while in the presence of the attending physician.:       I,:  Ricardo Jacques, DO personally performed the services described in this documentation    as scribed in my presence.:

## 2024-07-12 NOTE — PROGRESS NOTES
Ortho Sports Medicine Shoulder New Patient Visit     Assesment:   68 y.o. male right upper trapezius muscle spasm.    Plan:  Recommend RICE therapy. Topical voltaren gel provided via Rx due to CKD. Discussed medrol dose pack but will avoid given his diabetes. Recommend heat and physical therapy. Follow up PRN    Conservative treatment:    PT for ROM and strengthening to shoulder, rotator cuff, scapular stabilizers.  OTC meds for pain  Prescribed topical Voltaren gel to use prn.      Imaging:    All imaging from today was reviewed by myself and explained to the patient.       Injection:    No Injection planned at this time.      Surgery:     No surgery is recommended at this point, continue with conservative treatment plan as noted.      Follow up:    No follow-ups on file.        Chief Complaint   Patient presents with    Right Shoulder - Pain       History of Present Illness:    The patient is a 68 y.o., right hand dominant male  referred to me by Dr. Carlito Oliver MD. He states the pain began on 7/7/2024 while he was sleeping and woke up with pain.  He states that he tried over-the-counter pain medications for a few days with no relief.  He was seen in the emergency department on 7/11/2024 where he was prescribed lidocaine patches and oxycodone for pain.  Today he states that he is still having significant pain on the superior aspect of his shoulder near the trapezius muscle.  He states that he has not been able to sleep and has had no appetite.  He states that he has slight numbness tingling that goes into his fingers, but he states that because he is diabetic he gets that intermittently.       Shoulder Surgical History:  None    Past Medical, Social and Family History:  Past Medical History:   Diagnosis Date    Arthritis 2010's    Occasionally flares up    Cancer (HCC) May 2021    Still investigating    Chronic kidney disease     Diabetes mellitus (HCC)     Follicular lymphoma (HCC)     GERD (gastroesophageal  reflux disease) June 2021    Noticed in an Endoscopy    Heart murmur May 2020    High cholesterol     Hypertension     Kidney stone 1980's    Have had since 1980's    Obesity Since Childhood     Past Surgical History:   Procedure Laterality Date    BUNIONECTOMY Right 11/24/2020    Procedure: RIGHT HAV CORRECTION,;  Surgeon: Niranjan Child DPM;  [facility];  Service: Podiatry    COLONOSCOPY      INCISION AND DRAINAGE OF WOUND Right 10/05/2016    Procedure: INCISION AND DRAINAGE (I&D) EXTREMITY;  Surgeon: Niranjan Child DPM;  [facility];  Service:     IR BIOPSY LYMPH NODE  07/08/2021    IR EMBOLIZATION (SPECIFY VESSEL OR SITE)  07/08/2021    IR PORT PLACEMENT  9/1/2022    PILONIDAL CYST EXCISION      MD CORRECTION HAMMERTOE Right 02/19/2019    Procedure: THIRD HAMMER TOE CORRECTION;  Surgeon: Niranjan Child DPM;  [facility];  Service: Podiatry    MD RMVL MATEO CTR VAD W/SUBQ PORT/ CTR/PRPH INSJ N/A 3/14/2023    Procedure: REMOVAL VENOUS PORT (PORT-A-CATH)IR;  Surgeon: Avelino Vázquez DO;  [facility];  Service: Interventional Radiology    TOE OSTEOTOMY Right 03/14/2017    Procedure: HAMMERTOE CORRECTION R 2 ;  Surgeon: Niranjan Child DPM;  [facility];  Service:     TOE OSTEOTOMY Left 11/24/2020    Procedure: LEFT HT CORRECTION TOE;  Surgeon: Niranjan Child DPM;  [facility];  Service: Podiatry    TONSILLECTOMY  1963     Allergies   Allergen Reactions    Lisinopril Cough     Current Outpatient Medications on File Prior to Visit   Medication Sig Dispense Refill    allopurinol (ZYLOPRIM) 300 mg tablet Take 1 tablet (300 mg total) by mouth daily 90 tablet 1    amLODIPine (NORVASC) 10 mg tablet Take 1 tablet (10 mg total) by mouth daily 90 tablet 1    aspirin 81 mg chewable tablet Chew 81 mg daily      Cholecalciferol 100 MCG (4000 UT) CAPS Take 1 capsule (4,000 Units total) by mouth in the morning      Empagliflozin (Jardiance) 25 MG TABS TAKE 1 TABLET BY MOUTH EVERY  DAY IN THE MORNING 90 tablet 1    hydroCHLOROthiazide 25 mg tablet Take 1 tablet (25 mg total) by mouth daily 90 tablet 1    lidocaine (Lidoderm) 5 % Apply 1 patch topically over 12 hours daily for 14 days Remove & Discard patch within 12 hours or as directed by MD 14 patch 0    losartan (COZAAR) 100 MG tablet Take 1 tablet (100 mg total) by mouth daily 90 tablet 1    metFORMIN (GLUCOPHAGE) 500 mg tablet TAKE 2 TABLETS BY MOUTH TWICE A DAY WITH FOOD 360 tablet 1    methocarbamol (ROBAXIN) 500 mg tablet Take 1 tablet (500 mg total) by mouth 2 (two) times a day as needed for muscle spasms for up to 10 days 20 tablet 0    metoprolol succinate (TOPROL-XL) 100 mg 24 hr tablet Take 1 tablet (100 mg total) by mouth every evening 90 tablet 1    Multiple Vitamins-Minerals (Centrum Silver 50+Men) TABS       oxyCODONE (Roxicodone) 5 immediate release tablet Take 1 tablet (5 mg total) by mouth every 6 (six) hours as needed for moderate pain for up to 7 doses Max Daily Amount: 20 mg 7 tablet 0    rosuvastatin (CRESTOR) 40 MG tablet Take 1 tablet (40 mg total) by mouth every evening 90 tablet 1     Current Facility-Administered Medications on File Prior to Visit   Medication Dose Route Frequency Provider Last Rate Last Admin    [COMPLETED] lidocaine (LMX) 4 % cream   Topical Once Susie Luu DPM   Given at 07/11/24 1347     Social History     Socioeconomic History    Marital status: /Civil Union     Spouse name: Not on file    Number of children: Not on file    Years of education: Not on file    Highest education level: Not on file   Occupational History    Not on file   Tobacco Use    Smoking status: Never    Smokeless tobacco: Never    Tobacco comments:     Never smoked but exposed to second hand smoke from birth until 1980's   Vaping Use    Vaping status: Never Used   Substance and Sexual Activity    Alcohol use: Never    Drug use: Never    Sexual activity: Not Currently     Partners: Female     Birth  "control/protection: Abstinence   Other Topics Concern    Not on file   Social History Narrative    Not on file     Social Determinants of Health     Financial Resource Strain: Not on file   Food Insecurity: No Food Insecurity (6/16/2024)    Hunger Vital Sign     Worried About Running Out of Food in the Last Year: Never true     Ran Out of Food in the Last Year: Never true   Transportation Needs: No Transportation Needs (6/16/2024)    PRAPARE - Transportation     Lack of Transportation (Medical): No     Lack of Transportation (Non-Medical): No   Physical Activity: Not on file   Stress: Not on file   Social Connections: Not on file   Intimate Partner Violence: Not on file   Housing Stability: Low Risk  (6/16/2024)    Housing Stability Vital Sign     Unable to Pay for Housing in the Last Year: No     Number of Times Moved in the Last Year: 0     Homeless in the Last Year: No         I have reviewed the past medical, surgical, social and family history, medications and allergies as documented in the EMR.    Review of systems: ROS is negative other than that noted in the HPI.  Constitutional: Negative for fatigue and fever.   HENT: Negative for sore throat.    Respiratory: Negative for shortness of breath.    Cardiovascular: Negative for chest pain.   Gastrointestinal: Negative for abdominal pain.   Endocrine: Negative for cold intolerance and heat intolerance.   Genitourinary: Negative for flank pain.   Musculoskeletal: Negative for back pain.   Skin: Negative for rash.   Allergic/Immunologic: Negative for immunocompromised state.   Neurological: Negative for dizziness.   Psychiatric/Behavioral: Negative for agitation.      Physical Exam:    Blood pressure 130/74, pulse 85, resp. rate 17, height 6' 1\" (1.854 m), weight 126 kg (278 lb).    General/Constitutional: NAD, well developed, well nourished  HENT: Normocephalic, atraumatic  CV: Intact distal pulses, regular rate  Resp: No respiratory distress or labored " breathing  Lymphatic: No lymphadenopathy palpated  Neuro: Alert and Oriented x 3, no focal deficits  Psych: Normal mood, normal affect, normal judgement, normal behavior  Skin: Warm, dry, no rashes, no erythema         Visual inspection of the shoulder demonstrates kyphosis of the neck and c spine  No evidence of scapular dyskinesia or atrophy.  No scapular winging  Active and passive range of motion were all WNL.  No tenderness to palpation at the distal clavicle, AC joint, acromion, or scapular spine  Significant tenderness to palpation to trapezius   Painful neck ROM with rotation and sidebending to the Right side  Paraspinal tenderness to palpation  Negative Spurling's test      UE NV Exam: +2 Radial pulses bilaterally  Sensation intact to light touch C5-T1 bilaterally, Radial/median/ulnar nerve motor intact      Bilateral elbow, wrist, and and forearm ROM full, painless with passive ROM, no ttp or crepitance throughout extremities below shoulder joint    Cervical ROM revealed slight pain and tightness with left rotation.      Shoulder Imaging    X-rays of the right shoulder were reviewed, which demonstrate mild osteoarthritis without acute abnormality. Small calcification at supraspinatus insertion.  There is no accompanying radiology report.        Scribe Attestation      I,:  Sylvain Manley am acting as a scribe while in the presence of the attending physician.:       I,:  Ricardo Jacques, DO personally performed the services described in this documentation    as scribed in my presence.:

## 2024-07-15 ENCOUNTER — OFFICE VISIT (OUTPATIENT)
Dept: FAMILY MEDICINE CLINIC | Facility: CLINIC | Age: 69
End: 2024-07-15
Payer: MEDICARE

## 2024-07-15 VITALS
HEART RATE: 84 BPM | SYSTOLIC BLOOD PRESSURE: 120 MMHG | HEIGHT: 73 IN | DIASTOLIC BLOOD PRESSURE: 72 MMHG | WEIGHT: 272.6 LBS | TEMPERATURE: 99.4 F | BODY MASS INDEX: 36.13 KG/M2 | OXYGEN SATURATION: 95 %

## 2024-07-15 DIAGNOSIS — M62.830 SPASM OF RIGHT TRAPEZIUS MUSCLE: Primary | ICD-10-CM

## 2024-07-15 PROBLEM — I35.0 AORTIC STENOSIS WITH TRILEAFLET VALVE: Status: ACTIVE | Noted: 2024-07-15

## 2024-07-15 PROCEDURE — G2211 COMPLEX E/M VISIT ADD ON: HCPCS | Performed by: FAMILY MEDICINE

## 2024-07-15 PROCEDURE — 99214 OFFICE O/P EST MOD 30 MIN: CPT | Performed by: FAMILY MEDICINE

## 2024-07-15 RX ORDER — CYCLOBENZAPRINE HCL 10 MG
10 TABLET ORAL 3 TIMES DAILY PRN
Qty: 30 TABLET | Refills: 0 | Status: SHIPPED | OUTPATIENT
Start: 2024-07-15 | End: 2024-07-26 | Stop reason: SDUPTHER

## 2024-07-15 RX ORDER — METHYLPREDNISOLONE 4 MG/1
TABLET ORAL
Qty: 21 EACH | Refills: 0 | Status: SHIPPED | OUTPATIENT
Start: 2024-07-15

## 2024-07-15 NOTE — PROGRESS NOTES
"Ambulatory Visit  Name: Augustus Coffman      : 1955      MRN: [id number]  Encounter Provider: Gwen Lazo DO  Encounter Date: 7/15/2024   Encounter department: [facility] [address] [place] [address]    Assessment & Plan   1. Spasm of right trapezius muscle  -     methylPREDNISolone 4 MG tablet therapy pack; Use as directed on package  -     cyclobenzaprine (FLEXERIL) 10 mg tablet; Take 1 tablet (10 mg total) by mouth 3 (three) times a day as needed for muscle spasms      Depression Screening and Follow-up Plan: Patient was screened for depression during today's encounter. They screened negative with a PHQ-2 score of 0.      History of Present Illness     Neck Pain       Patient presents to the office for follow up.     Notes that he has had neck and shoulder pain, woke up with this. States that this is located on the right side. This started about 1 week ago. Reports that he went to the ED (had EKG and labs), was given muscle relaxers and pain medication. States that this was not very helpful. Went to ortho, had XRs and exam, was told to see PT. States that he was told to follow up here. He has not been able to sleep. Denies any injuries. Tried ice, mildly helpful at first. Tried lanolin patches which has helped. Pain is sharp, burning. Denies rash in that area that he knows of.     Has been taking Tylenol and Motrin without much improvement.     Review of Systems   Musculoskeletal:  Positive for neck pain.       Objective     /72 (BP Location: Left arm, Patient Position: Sitting, Cuff Size: Standard)   Pulse 84   Temp 99.4 °F (37.4 °C) (Tympanic)   Ht 6' 1\" (1.854 m)   Wt 124 kg (272 lb 9.6 oz)   SpO2 95%   BMI 35.97 kg/m²     Physical Exam  Vitals reviewed.   Constitutional:       General: He is not in acute distress.     Appearance: Normal appearance.   HENT:      Head: Normocephalic and atraumatic.      Right Ear: External ear normal.      Left Ear: External ear " normal.      Nose: Nose normal.      Mouth/Throat:      Mouth: Mucous membranes are moist.   Eyes:      Extraocular Movements: Extraocular movements intact.      Conjunctiva/sclera: Conjunctivae normal.   Cardiovascular:      Rate and Rhythm: Normal rate and regular rhythm.   Pulmonary:      Effort: Pulmonary effort is normal.      Breath sounds: Normal breath sounds. No wheezing, rhonchi or rales.   Musculoskeletal:      Right shoulder: Normal range of motion.      Left shoulder: Normal range of motion.      Cervical back: Spasms present.      Thoracic back: Spasms present.   Skin:     General: Skin is warm.      Capillary Refill: Capillary refill takes less than 2 seconds.      Findings: No rash.   Neurological:      Mental Status: He is alert. Mental status is at baseline.       Administrative Statements       DO Justin Garza [facility]  7/15/2024 1:00 PM

## 2024-07-17 ENCOUNTER — CLINICAL SUPPORT (OUTPATIENT)
Dept: BARIATRICS | Facility: CLINIC | Age: 69
End: 2024-07-17

## 2024-07-17 VITALS — BODY MASS INDEX: 35.31 KG/M2 | WEIGHT: 266.4 LBS | HEIGHT: 73 IN

## 2024-07-17 DIAGNOSIS — R63.5 ABNORMAL WEIGHT GAIN: Primary | ICD-10-CM

## 2024-07-17 PROCEDURE — RECHECK

## 2024-07-18 ENCOUNTER — OFFICE VISIT (OUTPATIENT)
Dept: WOUND CARE | Facility: CLINIC | Age: 69
End: 2024-07-18
Payer: MEDICARE

## 2024-07-18 VITALS
TEMPERATURE: 97.5 F | HEART RATE: 77 BPM | RESPIRATION RATE: 18 BRPM | DIASTOLIC BLOOD PRESSURE: 67 MMHG | SYSTOLIC BLOOD PRESSURE: 128 MMHG

## 2024-07-18 DIAGNOSIS — E08.621 DIABETIC ULCER OF OTHER PART OF RIGHT FOOT ASSOCIATED WITH DIABETES MELLITUS DUE TO UNDERLYING CONDITION, WITH FAT LAYER EXPOSED (HCC): Primary | ICD-10-CM

## 2024-07-18 DIAGNOSIS — L97.512 DIABETIC ULCER OF OTHER PART OF RIGHT FOOT ASSOCIATED WITH DIABETES MELLITUS DUE TO UNDERLYING CONDITION, WITH FAT LAYER EXPOSED (HCC): Primary | ICD-10-CM

## 2024-07-18 PROCEDURE — 11042 DBRDMT SUBQ TIS 1ST 20SQCM/<: CPT | Performed by: PODIATRIST

## 2024-07-18 RX ORDER — LIDOCAINE 40 MG/G
CREAM TOPICAL ONCE
Status: COMPLETED | OUTPATIENT
Start: 2024-07-18 | End: 2024-07-18

## 2024-07-18 RX ADMIN — LIDOCAINE: 40 CREAM TOPICAL at 14:10

## 2024-07-18 NOTE — PROGRESS NOTES
Patient ID: Augustus Coffman is a 68 y.o. male Date of Birth 1955       Chief Complaint   Patient presents with    Follow Up Wound Care Visit     Right foot diabetic ulcer       Allergies:  Lisinopril    Diagnosis:  1. 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 Diabetic ulcer of other part of right foot associated with diabetes mellitus due to underlying condition, with fat layer exposed (HCC)  E08.621 lidocaine (LMX) 4 % cream    L97.512 Wound cleansing and dressings Diabetic Ulcer Right;Plantar;Posterior Foot     Wound Procedure Treatment Diabetic Ulcer Right;Plantar;Posterior Foot           Assessment & Plan:  See wound orders.    Patient is feeling better but still in some shoulder pain.  Patient will likely be off the foot even more which is doing the tissue and garfield-wound good.  Patient will continue with weight loss and BS control.  It has been good due to lack of appetite which has improved a bit.  Continue local wound care.    Subjective:   The patient notes he still has neck pain but he is doing much better.  He did take two Covid tests which were both negative.  He also did reach out to Dr. Lazo who saw him for an evaluation.        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HCC)    Hypertension    Hypercholesteremia    Hammer toe of right foot    Stasis dermatitis of both legs    Diabetic peripheral neuropathy (HCC)    Gout    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HCC)    Obesity with body mass index 30 or greater    Type 2 diabetes mellitus  (HCC)    Retroperitoneal hematoma    Mesenteric lymphadenopathy    Acute blood loss anemia    Heart murmur    GI bleed    Pleural effusion    Chronic venous hypertension (idiopathic) with ulcer of right lower extremity (HCC)    Rash    Stage 2 chronic kidney disease    Hypertensive kidney disease with stage 2 chronic kidney disease    Other proteinuria    Obesity, morbid (HCC)    Follicular lymphoma grade I of lymph nodes of multiple sites (HCC)    Vitamin D deficiency    Stage 3a chronic kidney disease (HCC)    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 (HCC)    Ectatic thoracic aorta (HCC)    Aortic stenosis with trileaflet valve     Past Medical History:   Diagnosis Date    Arthritis 2010's    Occasionally flares up    Cancer (HCC) May 2021    Still investigating    Chronic kidney disease     Diabetes mellitus (HCC)     Follicular lymphoma (HCC)     GERD (gastroesophageal reflux disease) June 2021    Noticed in an Endoscopy    Heart murmur May 2020    High cholesterol     Hypertension     Kidney stone 1980's    Have had since 1980's    Obesity Since Childhood     Past Surgical History:   Procedure Laterality Date    BUNIONECTOMY Right 11/24/2020    Procedure: RIGHT HAV CORRECTION,;  Surgeon: Niranjan Child DPM;  Location: [facility];  Service: Podiatry    COLONOSCOPY      INCISION AND DRAINAGE OF WOUND Right 10/05/2016    Procedure: INCISION AND DRAINAGE (I&D) EXTREMITY;  Surgeon: Niranjan Child DPM;  Location: [facility];  Service:     IR BIOPSY LYMPH NODE  07/08/2021    IR EMBOLIZATION (SPECIFY VESSEL OR SITE)  07/08/2021    IR PORT PLACEMENT  9/1/2022    PILONIDAL CYST EXCISION      AR CORRECTION HAMMERTOE Right 02/19/2019    Procedure: THIRD HAMMER TOE CORRECTION;  Surgeon: Niranjan Child DPM;  Location: [facility];  Service: Podiatry    AR RMVL MATEO CTR VAD W/SUBQ PORT/ CTR/PRPH INSJ N/A 3/14/2023    Procedure: REMOVAL VENOUS PORT (PORT-A-CATH)IR;  Surgeon: Avelino Vázquez DO;  [facility];  Service:  Interventional Radiology    TOE OSTEOTOMY Right 03/14/2017    Procedure: HAMMERTOE CORRECTION R 2 ;  Surgeon: Niranjan Child DPM;  [facility];  Service:     TOE OSTEOTOMY Left 11/24/2020    Procedure: LEFT HT CORRECTION TOE;  Surgeon: Niranjan Child DPM;  Location: [facility];  Service: Podiatry    TONSILLECTOMY  1963     Social History     Socioeconomic History    Marital status: /Civil Union     Spouse name: None    Number of children: None    Years of education: None    Highest education level: None   Occupational History    None   Tobacco Use    Smoking status: Never    Smokeless tobacco: Never    Tobacco comments:     Never smoked but exposed to second hand smoke from birth until 1980's   Vaping Use    Vaping status: Never Used   Substance and Sexual Activity    Alcohol use: Never    Drug use: Never    Sexual activity: Not Currently     Partners: Female     Birth control/protection: Abstinence   Other Topics Concern    None   Social History Narrative    None     Social Determinants of Health     Financial Resource Strain: Not on file   Food Insecurity: No Food Insecurity (6/16/2024)    Hunger Vital Sign     Worried About Running Out of Food in the Last Year: Never true     Ran Out of Food in the Last Year: Never true   Transportation Needs: No Transportation Needs (6/16/2024)    PRAPARE - Transportation     Lack of Transportation (Medical): No     Lack of Transportation (Non-Medical): No   Physical Activity: Not on file   Stress: Not on file   Social Connections: Not on file   Intimate Partner Violence: Not on file   Housing Stability: Low Risk  (6/16/2024)    Housing Stability Vital Sign     Unable to Pay for Housing in the Last Year: No     Number of Times Moved in the Last Year: 0     Homeless in the Last Year: No        Current Outpatient Medications:     allopurinol (ZYLOPRIM) 300 mg tablet, Take 1 tablet (300 mg total) by mouth daily, Disp: 90 tablet, Rfl: 1    amLODIPine (NORVASC) 10 mg  tablet, Take 1 tablet (10 mg total) by mouth daily, Disp: 90 tablet, Rfl: 1    aspirin 81 mg chewable tablet, Chew 81 mg daily, Disp: , Rfl:     Cholecalciferol 100 MCG (4000 UT) CAPS, Take 1 capsule (4,000 Units total) by mouth in the morning, Disp: , Rfl:     cyclobenzaprine (FLEXERIL) 10 mg tablet, Take 1 tablet (10 mg total) by mouth 3 (three) times a day as needed for muscle spasms, Disp: 30 tablet, Rfl: 0    Diclofenac Sodium (VOLTAREN) 1 %, Apply 2 g topically 4 (four) times a day, Disp: 100 g, Rfl: 1    Empagliflozin (Jardiance) 25 MG TABS, TAKE 1 TABLET BY MOUTH EVERY DAY IN THE MORNING, Disp: 90 tablet, Rfl: 1    hydroCHLOROthiazide 25 mg tablet, Take 1 tablet (25 mg total) by mouth daily, Disp: 90 tablet, Rfl: 1    lidocaine (Lidoderm) 5 %, Apply 1 patch topically over 12 hours daily for 14 days Remove & Discard patch within 12 hours or as directed by MD, Disp: 14 patch, Rfl: 0    losartan (COZAAR) 100 MG tablet, Take 1 tablet (100 mg total) by mouth daily, Disp: 90 tablet, Rfl: 1    metFORMIN (GLUCOPHAGE) 500 mg tablet, TAKE 2 TABLETS BY MOUTH TWICE A DAY WITH FOOD, Disp: 360 tablet, Rfl: 1    methylPREDNISolone 4 MG tablet therapy pack, Use as directed on package, Disp: 21 each, Rfl: 0    metoprolol succinate (TOPROL-XL) 100 mg 24 hr tablet, Take 1 tablet (100 mg total) by mouth every evening, Disp: 90 tablet, Rfl: 1    Multiple Vitamins-Minerals (Centrum Silver 50+Men) TABS, , Disp: , Rfl:     oxyCODONE (Roxicodone) 5 immediate release tablet, Take 1 tablet (5 mg total) by mouth every 6 (six) hours as needed for moderate pain for up to 7 doses Max Daily Amount: 20 mg, Disp: 7 tablet, Rfl: 0    rosuvastatin (CRESTOR) 40 MG tablet, Take 1 tablet (40 mg total) by mouth every evening, Disp: 90 tablet, Rfl: 1  No current facility-administered medications for this visit.  Family History   Problem Relation Age of Onset    Cancer Father         Leukemia      Review of Systems   Constitutional:  Negative for  chills and fever.         Objective:  /67   Pulse 77   Temp 97.5 °F (36.4 °C)   Resp 18     Physical Exam  Neurological:      Mental Status: He is alert.             Wound 09/01/22 Incision Chest Anterior;Right (Active)       Wound 03/14/23 Chest Right (Active)       Wound 09/07/23 Diabetic Ulcer Foot Right;Plantar;Posterior (Active)   Enter Singh score: Singh Grade 1: Partial or full-thickness ulcer (superficial) 07/11/24 1346   Wound Image   07/18/24 1418   Wound Description Pink;Yellow 07/18/24 1403   Delisa-wound Assessment Callus 07/18/24 1403   Wound Length (cm) 1.1 cm 07/18/24 1403   Wound Width (cm) 1.4 cm 07/18/24 1403   Wound Depth (cm) 0.1 cm 07/18/24 1403   Wound Surface Area (cm^2) 1.54 cm^2 07/18/24 1403   Wound Volume (cm^3) 0.154 cm^3 07/18/24 1403   Calculated Wound Volume (cm^3) 0.15 cm^3 07/18/24 1403   Change in Wound Size % 85 07/18/24 1403   Tunneling 1 in depth located at 1-12 o'clock 10/05/23 1348   Number of underminings 1 07/11/24 1346   Undermining 1 0.4 07/11/24 1346   Undermining 1 is depth extending from 7-11 07/11/24 1346   Drainage Amount Small 07/18/24 1403   Drainage Description Serosanguineous 07/18/24 1403   Non-staged Wound Description Full thickness 07/18/24 1403   Dressing Status Intact 07/18/24 1403                         Debridement    Universal Protocol:  Consent: Verbal consent obtained.  Consent given by: patient  Patient understanding: patient states understanding of the procedure being performed  Patient identity confirmed: verbally with patient    Debridement Details  Performed by: physician  Debridement type: surgical  Level of debridement: subcutaneous tissue  Pain control: lidocaine 1%      Post-debridement measurements  Length (cm): 1.3  Width (cm): 1.4  Depth (cm): 0.2  Percent debrided: 100%  Surface Area (cm^2): 1.82  Area Debrided (cm^2): 1.82  Volume (cm^3): 0.36    Tissue and other material debrided: subcutaneous tissue  Devitalized tissue debrided:  "saranya  Instrument(s) utilized: curette  Technique utilized: nonexcisionalBleeding: small  Hemostasis obtained with: pressure  Procedural pain (0-10): insensate  Post-procedural pain: insensate   Response to treatment: procedure was tolerated well                 Wound Instructions:  Orders Placed This Encounter   Procedures    Wound cleansing and dressings Diabetic Ulcer Right;Plantar;Posterior Foot     Wound cleansing and dressings Diabetic Ulcer Right;Plantar;Posterior Foot       RIGHT FOOT WOUND      Wash your hands with soap and water. Remove old dressing, discard into plastic bag and place in trash. Cleanse the wound with Dakins Solution prior to applying a clean dressing. Do not use tissue or cotton balls. Do not scrub the wound. Pat dry using gauze.      Shower: No      Apply Skin Prep to skin surrounding wound.   Apply Silver Alginate to the open wound.   Cover with Gauze and ABD.   Secure with Julisa and Tape.      Change dressing every day.     Keep at weight off of right foot at all times     Standing Status:   Future     Standing Expiration Date:   7/25/2024    Wound Procedure Treatment Diabetic Ulcer Right;Plantar;Posterior Foot     This order was created via procedure documentation    Debridement     This order was created via procedure documentation         Susie Luu DPM      Portions of the record may have been created with voice recognition software. Occasional wrong word or \"sound a like\" substitutions may have occurred due to the inherent limitations of voice recognition software. Read the chart carefully and recognize, using context, where substitutions have occurred.     "

## 2024-07-18 NOTE — PATIENT INSTRUCTIONS
Orders Placed This Encounter   Procedures    Wound cleansing and dressings Diabetic Ulcer Right;Plantar;Posterior Foot     Wound cleansing and dressings Diabetic Ulcer Right;Plantar;Posterior Foot       RIGHT FOOT WOUND      Wash your hands with soap and water. Remove old dressing, discard into plastic bag and place in trash. Cleanse the wound with Dakins Solution prior to applying a clean dressing. Do not use tissue or cotton balls. Do not scrub the wound. Pat dry using gauze.      Shower: No      Apply Skin Prep to skin surrounding wound.   Apply Silver Alginate to the open wound.   Cover with Gauze and ABD.   Secure with Julisa and Tape.      Change dressing every day.     Keep at weight off of right foot at all times     Standing Status:   Future     Standing Expiration Date:   7/25/2024

## 2024-07-25 ENCOUNTER — OFFICE VISIT (OUTPATIENT)
Dept: WOUND CARE | Facility: CLINIC | Age: 69
End: 2024-07-25
Payer: MEDICARE

## 2024-07-25 VITALS
HEART RATE: 76 BPM | TEMPERATURE: 97.8 F | DIASTOLIC BLOOD PRESSURE: 65 MMHG | SYSTOLIC BLOOD PRESSURE: 136 MMHG | RESPIRATION RATE: 16 BRPM

## 2024-07-25 DIAGNOSIS — E08.621 DIABETIC ULCER OF OTHER PART OF RIGHT FOOT ASSOCIATED WITH DIABETES MELLITUS DUE TO UNDERLYING CONDITION, WITH FAT LAYER EXPOSED (HCC): Primary | ICD-10-CM

## 2024-07-25 DIAGNOSIS — L97.512 DIABETIC ULCER OF OTHER PART OF RIGHT FOOT ASSOCIATED WITH DIABETES MELLITUS DUE TO UNDERLYING CONDITION, WITH FAT LAYER EXPOSED (HCC): Primary | ICD-10-CM

## 2024-07-25 PROCEDURE — 99284 EMERGENCY DEPT VISIT MOD MDM: CPT

## 2024-07-25 PROCEDURE — 11042 DBRDMT SUBQ TIS 1ST 20SQCM/<: CPT | Performed by: ORTHOPAEDIC SURGERY

## 2024-07-25 PROCEDURE — 99282 EMERGENCY DEPT VISIT SF MDM: CPT

## 2024-07-25 RX ORDER — LIDOCAINE 40 MG/G
CREAM TOPICAL ONCE
Status: COMPLETED | OUTPATIENT
Start: 2024-07-25 | End: 2024-07-25

## 2024-07-25 RX ADMIN — LIDOCAINE: 40 CREAM TOPICAL at 13:42

## 2024-07-25 NOTE — PATIENT INSTRUCTIONS
Orders Placed This Encounter   Procedures    Wound cleansing and dressings Diabetic Ulcer Right;Plantar;Posterior Foot     RIGHT FOOT WOUND     Wash your hands with soap and water. Remove old dressing, discard into plastic bag and place in trash. Cleanse the wound with Dakins Solution prior to applying a clean dressing. Do not use tissue or cotton balls. Do not scrub the wound. Pat dry using gauze.     Shower: No     Apply Mepilex Up to the wound.    Cover with Gauze and ABD.   Secure with Julisa and Tape.     Change dressing every day.     Keep at weight off of right foot at all times.     Standing Status:   Future     Standing Expiration Date:   8/1/2024

## 2024-07-25 NOTE — PROGRESS NOTES
Wound Procedure Treatment Diabetic Ulcer Right;Plantar;Posterior Foot    Performed by: Maryana Bocanegra RN  Authorized by: Teresa Palma PA-C    Associated wounds:   Wound 09/07/23 Diabetic Ulcer Foot Right;Plantar;Posterior  Wound cleansed with:  NSS  Applied primary dressing:  Other  Applied secondary dressing:  ABD and Gauze  Dressing secured with:  Julisa and Tape    Mepilex UP

## 2024-07-25 NOTE — PROGRESS NOTES
Patient ID: Augustus Coffman is a 68 y.o. male Date of Birth 1955       Chief Complaint   Patient presents with    Follow Up Wound Care Visit     RIGHT FOOT WOUND       Allergies:  Lisinopril    Diagnosis:   Diagnosis ICD-10-CM Associated Orders   1. Diabetic ulcer of other part of right foot associated with diabetes mellitus due to underlying condition, with fat layer exposed ([facility])  E08.621 lidocaine (LMX) 4 % cream    L97.512 Wound cleansing and dressings Diabetic Ulcer Right;Plantar;Posterior Foot     Wound Procedure Treatment Diabetic Ulcer Right;Plantar;Posterior Foot           Assessment and Plan :  Follow up evaluation of Singh Grade 2 Right diabetic foot ulcer progressively worsening and increasing in size with adherent slough and moderate drainage. Had a lengthy discussion on the benefits of a TCC with pt today. Pt is understanding and is willing to try once wound bed is suitable to TCC.  Debrided as below  Change wound management to Mepilex Up. See wound orders below   No harsh cleansers such as alcohol, peroxide, or antibacterial soap, do not we nor submerge in water  Counseled on importance of frequent elevation of leg decreasing activity level for wound healing.  Proper offloading with off-loading with surgical shoe and crutches as pt can not tolerate Orthowedge Forefoot offloading shoe.   Followup with Podiatry in 1 week(s) or call sooner with questions or concerns or any signs of infection such as redness, swelling, increased/purulent drainage, fever, chills, increased severe pain.     Subjective:   Pt is a 68 y.o. patient of Dr. Luu with DMII who presents for f/u evaluation of DFU on R foot. Patient is in excruciating pain from his R shoulder and is currently on steroids. Pt has been applying silver alginate to wound bed and skin prep to periwound. Pt has been ambulating with crutches to help off-load pressure on wound bed. Denies fevers or chills.          The following portions of the  patient's history were reviewed and updated as appropriate:   Patient Active Problem List   Diagnosis    Diabetes 1.5, managed as type 2 (HCC)    Hypertension    Hypercholesteremia    Hammer toe of right foot    Stasis dermatitis of both legs    Diabetic peripheral neuropathy (HCC)    Gout    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HCC)    Obesity with body mass index 30 or greater    Type 2 diabetes mellitus (HCC)    Retroperitoneal hematoma    Mesenteric lymphadenopathy    Acute blood loss anemia    Heart murmur    GI bleed    Pleural effusion    Chronic venous hypertension (idiopathic) with ulcer of right lower extremity (HCC)    Rash    Stage 2 chronic kidney disease    Hypertensive kidney disease with stage 2 chronic kidney disease    Other proteinuria    Obesity, morbid (HCC)    Follicular lymphoma grade I of lymph nodes of multiple sites (HCC)    Vitamin D deficiency    Stage 3a chronic kidney disease (HCC)    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 (HCC)    Ectatic thoracic aorta (HCC)    Aortic stenosis with trileaflet valve     Past Medical History:   Diagnosis Date    Arthritis 2010's    Occasionally flares up    Cancer (HCC) May 2021    Still investigating    Chronic kidney disease     Diabetes mellitus (HCC)     Follicular lymphoma (HCC)     GERD (gastroesophageal reflux disease) June 2021    Noticed in an Endoscopy    Heart murmur May 2020    High cholesterol     Hypertension     Kidney stone 1980's    Have had since 1980's    Obesity Since Childhood     Past Surgical History:   Procedure Laterality Date    BUNIONECTOMY Right 11/24/2020    Procedure: RIGHT HAV CORRECTION,;  Surgeon: Niranjan Child DPM;  [facility];  Service: Podiatry    COLONOSCOPY      INCISION AND DRAINAGE OF WOUND Right 10/05/2016    Procedure: INCISION AND DRAINAGE (I&D) EXTREMITY;  Surgeon: Niranjan Child DPM;  [facility];  Service:     IR BIOPSY LYMPH NODE  07/08/2021    IR EMBOLIZATION (SPECIFY VESSEL OR SITE)  07/08/2021    IR  PORT PLACEMENT  9/1/2022    PILONIDAL CYST EXCISION      MN CORRECTION HAMMERTOE Right 02/19/2019    Procedure: THIRD HAMMER TOE CORRECTION;  Surgeon: Niranjan Child DPM;  [facility];  Service: Podiatry    MN RMVL MATEO CTR VAD W/SUBQ PORT/ CTR/PRPH INSJ N/A 3/14/2023    Procedure: REMOVAL VENOUS PORT (PORT-A-CATH)IR;  Surgeon: Avelino Vázquez DO;  [facility];  Service: Interventional Radiology    TOE OSTEOTOMY Right 03/14/2017    Procedure: HAMMERTOE CORRECTION R 2 ;  Surgeon: Niranjan Child DPM;  [facility];  Service:     TOE OSTEOTOMY Left 11/24/2020    Procedure: LEFT HT CORRECTION TOE;  Surgeon: Niranjan Child DPM;  [facility];  Service: Podiatry    TONSILLECTOMY  1963     Family History   Problem Relation Age of Onset    Cancer Father         Leukemia     Social History     Socioeconomic History    Marital status: /Civil Union     Spouse name: None    Number of children: None    Years of education: None    Highest education level: None   Occupational History    None   Tobacco Use    Smoking status: Never    Smokeless tobacco: Never    Tobacco comments:     Never smoked but exposed to second hand smoke from birth until 1980's   Vaping Use    Vaping status: Never Used   Substance and Sexual Activity    Alcohol use: Never    Drug use: Never    Sexual activity: Not Currently     Partners: Female     Birth control/protection: Abstinence   Other Topics Concern    None   Social History Narrative    None     Social Determinants of Health     Financial Resource Strain: Not on file   Food Insecurity: No Food Insecurity (6/16/2024)    Hunger Vital Sign     Worried About Running Out of Food in the Last Year: Never true     Ran Out of Food in the Last Year: Never true   Transportation Needs: No Transportation Needs (6/16/2024)    PRAPARE - Transportation     Lack of Transportation (Medical): No     Lack of Transportation (Non-Medical): No   Physical Activity: Not on file    Stress: Not on file   Social Connections: Not on file   Intimate Partner Violence: Not on file   Housing Stability: Low Risk  (6/16/2024)    Housing Stability Vital Sign     Unable to Pay for Housing in the Last Year: No     Number of Times Moved in the Last Year: 0     Homeless in the Last Year: No       Current Outpatient Medications:     allopurinol (ZYLOPRIM) 300 mg tablet, Take 1 tablet (300 mg total) by mouth daily, Disp: 90 tablet, Rfl: 1    amLODIPine (NORVASC) 10 mg tablet, Take 1 tablet (10 mg total) by mouth daily, Disp: 90 tablet, Rfl: 1    aspirin 81 mg chewable tablet, Chew 81 mg daily, Disp: , Rfl:     Cholecalciferol 100 MCG (4000 UT) CAPS, Take 1 capsule (4,000 Units total) by mouth in the morning, Disp: , Rfl:     cyclobenzaprine (FLEXERIL) 10 mg tablet, Take 1 tablet (10 mg total) by mouth 3 (three) times a day as needed for muscle spasms, Disp: 30 tablet, Rfl: 0    Diclofenac Sodium (VOLTAREN) 1 %, Apply 2 g topically 4 (four) times a day, Disp: 100 g, Rfl: 1    Empagliflozin (Jardiance) 25 MG TABS, TAKE 1 TABLET BY MOUTH EVERY DAY IN THE MORNING, Disp: 90 tablet, Rfl: 1    hydroCHLOROthiazide 25 mg tablet, Take 1 tablet (25 mg total) by mouth daily, Disp: 90 tablet, Rfl: 1    lidocaine (Lidoderm) 5 %, Apply 1 patch topically over 12 hours daily for 14 days Remove & Discard patch within 12 hours or as directed by MD, Disp: 14 patch, Rfl: 0    losartan (COZAAR) 100 MG tablet, Take 1 tablet (100 mg total) by mouth daily, Disp: 90 tablet, Rfl: 1    metFORMIN (GLUCOPHAGE) 500 mg tablet, TAKE 2 TABLETS BY MOUTH TWICE A DAY WITH FOOD, Disp: 360 tablet, Rfl: 1    methylPREDNISolone 4 MG tablet therapy pack, Use as directed on package, Disp: 21 each, Rfl: 0    metoprolol succinate (TOPROL-XL) 100 mg 24 hr tablet, Take 1 tablet (100 mg total) by mouth every evening, Disp: 90 tablet, Rfl: 1    Multiple Vitamins-Minerals (Centrum Silver 50+Men) TABS, , Disp: , Rfl:     oxyCODONE (Roxicodone) 5 immediate  release tablet, Take 1 tablet (5 mg total) by mouth every 6 (six) hours as needed for moderate pain for up to 7 doses Max Daily Amount: 20 mg, Disp: 7 tablet, Rfl: 0    rosuvastatin (CRESTOR) 40 MG tablet, Take 1 tablet (40 mg total) by mouth every evening, Disp: 90 tablet, Rfl: 1  No current facility-administered medications for this visit.    Review of Systems   Constitutional:  Negative for appetite change, chills, fatigue, fever and unexpected weight change.   HENT:  Negative for congestion, hearing loss and postnasal drip.    Respiratory:  Negative for cough and shortness of breath.    Cardiovascular:  Negative for leg swelling.   Musculoskeletal:  Positive for gait problem.   Skin:  Positive for wound (R foot). Negative for rash.   Neurological:  Negative for numbness.   Hematological:  Does not bruise/bleed easily.   Psychiatric/Behavioral: Negative.           Objective:  /65   Pulse 76   Temp 97.8 °F (36.6 °C)   Resp 16   Pain Score: 0-No pain     Physical Exam  Vitals reviewed.   Constitutional:       General: He is not in acute distress.     Appearance: Normal appearance. He is well-developed. He is obese.   HENT:      Head: Normocephalic and atraumatic.   Cardiovascular:      Rate and Rhythm: Normal rate.   Pulmonary:      Effort: Pulmonary effort is normal.   Musculoskeletal:         General: No deformity.      Right lower leg: No edema.      Left lower leg: No edema.        Feet:    Feet:      Comments: Black eschar on wound edges with adherent yellow slough on pink wound bed with moderate serosanguinous drainage.  Skin:     General: Skin is warm and dry.      Findings: Wound (R foot) present.   Neurological:      General: No focal deficit present.      Mental Status: He is alert and oriented to person, place, and time.      Gait: Gait abnormal.   Psychiatric:         Mood and Affect: Mood and affect normal.         Behavior: Behavior normal. Behavior is cooperative.         Wound 09/07/23  "Diabetic Ulcer Foot Right;Plantar;Posterior (Active)   Enter Singh score: Singh Grade 1: Partial or full-thickness ulcer (superficial) 07/25/24 1345   Wound Image Images linked 07/25/24 1402   Wound Description Pink;Yellow;Slough 07/25/24 1345   Delisa-wound Assessment Fragile;Maceration;Scar Tissue 07/25/24 1345   Wound Length (cm) 1 cm 07/25/24 1345   Wound Width (cm) 3.2 cm 07/25/24 1345   Wound Depth (cm) 0.1 cm 07/25/24 1345   Wound Surface Area (cm^2) 3.2 cm^2 07/25/24 1345   Wound Volume (cm^3) 0.32 cm^3 07/25/24 1345   Calculated Wound Volume (cm^3) 0.32 cm^3 07/25/24 1345   Change in Wound Size % 68 07/25/24 1345   Drainage Amount Moderate 07/25/24 1345   Drainage Description Serosanguineous 07/25/24 1345   Non-staged Wound Description Full thickness 07/25/24 1345   Dressing Status Intact 07/25/24 1345       Debridement   Wound 09/07/23 Diabetic Ulcer Foot Right;Plantar;Posterior    Universal Protocol:  Consent: Verbal consent obtained. Written consent obtained.  Risks and benefits: risks, benefits and alternatives were discussed  Consent given by: patient  Time out: Immediately prior to procedure a \"time out\" was called to verify the correct patient, procedure, equipment, support staff and site/side marked as required.  Patient understanding: patient states understanding of the procedure being performed  Patient identity confirmed: verbally with patient    Debridement Details  Performed by: PA  Debridement type: surgical  Level of debridement: subcutaneous tissue  Pain control: lidocaine 4%      Post-debridement measurements  Length (cm): 1  Width (cm): 3.2  Depth (cm): 0.2  Percent debrided: 100%  Surface Area (cm^2): 3.2  Area Debrided (cm^2): 3.2  Volume (cm^3): 0.64    Tissue and other material debrided: subcutaneous tissue  Devitalized tissue debrided: biofilm, exudate, slough and eschar  Instrument(s) utilized: curette  Bleeding: small  Hemostasis obtained with: pressure  Procedural pain (0-10): " "0  Post-procedural pain: 0   Response to treatment: procedure was tolerated well                   Wound Instructions:  Orders Placed This Encounter   Procedures    Wound cleansing and dressings Diabetic Ulcer Right;Plantar;Posterior Foot     RIGHT FOOT WOUND     Wash your hands with soap and water. Remove old dressing, discard into plastic bag and place in trash. Cleanse the wound with Dakins Solution prior to applying a clean dressing. Do not use tissue or cotton balls. Do not scrub the wound. Pat dry using gauze.     Shower: No     Apply Mepilex Up to the wound.    Cover with Gauze and ABD.   Secure with Julisa and Tape.     Change dressing every day.     Keep at weight off of right foot at all times.     Standing Status:   Future     Standing Expiration Date:   8/1/2024    Wound Procedure Treatment Diabetic Ulcer Right;Plantar;Posterior Foot     This order was created via procedure documentation       Teresa Palma PA-C, [facility]      Portions of the record may have been created with voice recognition software. Occasional wrong word or \"sound alike\" substitutions may have occurred due to the inherent limitations of voice recognition software. Read the chart carefully and recognize, using context, where substitutions have occurred.      "

## 2024-07-26 ENCOUNTER — HOSPITAL ENCOUNTER (EMERGENCY)
Facility: HOSPITAL | Age: 69
Discharge: HOME | DRG: 871 | End: 2024-07-26
Attending: EMERGENCY MEDICINE
Payer: MEDICARE

## 2024-07-26 VITALS
SYSTOLIC BLOOD PRESSURE: 149 MMHG | HEART RATE: 80 BPM | OXYGEN SATURATION: 98 % | BODY MASS INDEX: 35.31 KG/M2 | RESPIRATION RATE: 20 BRPM | WEIGHT: 267.64 LBS | DIASTOLIC BLOOD PRESSURE: 73 MMHG | TEMPERATURE: 97.7 F

## 2024-07-26 DIAGNOSIS — M62.830 SPASM OF RIGHT TRAPEZIUS MUSCLE: ICD-10-CM

## 2024-07-26 DIAGNOSIS — M25.511 ACUTE PAIN OF RIGHT SHOULDER: Primary | ICD-10-CM

## 2024-07-26 RX ORDER — OXYCODONE HYDROCHLORIDE 5 MG/1
5 TABLET ORAL ONCE
Status: COMPLETED | OUTPATIENT
Start: 2024-07-26 | End: 2024-07-26

## 2024-07-26 RX ORDER — METHOCARBAMOL 500 MG/1
500 TABLET, FILM COATED ORAL ONCE
Status: COMPLETED | OUTPATIENT
Start: 2024-07-26 | End: 2024-07-26

## 2024-07-26 RX ORDER — CYCLOBENZAPRINE HCL 10 MG
10 TABLET ORAL 3 TIMES DAILY PRN
Qty: 30 TABLET | Refills: 0 | Status: SHIPPED | OUTPATIENT
Start: 2024-07-26

## 2024-07-26 RX ORDER — LIDOCAINE 50 MG/G
1 PATCH TOPICAL ONCE
Status: DISCONTINUED | OUTPATIENT
Start: 2024-07-26 | End: 2024-07-26 | Stop reason: HOSPADM

## 2024-07-26 RX ORDER — LIDOCAINE 40 MG/G
CREAM TOPICAL AS NEEDED
Qty: 120 G | Refills: 0 | Status: SHIPPED | OUTPATIENT
Start: 2024-07-26

## 2024-07-26 RX ADMIN — LIDOCAINE 1 PATCH: 50 PATCH CUTANEOUS at 02:09

## 2024-07-26 RX ADMIN — METHOCARBAMOL TABLETS 500 MG: 500 TABLET, COATED ORAL at 02:09

## 2024-07-26 RX ADMIN — OXYCODONE HYDROCHLORIDE 5 MG: 5 TABLET ORAL at 02:09

## 2024-07-26 NOTE — TELEPHONE ENCOUNTER
Reason for call:   [x] Refill   [] Prior Auth  [] Other:     Office:   [x] PCP/Provider - dr braswell  [] Specialty/Provider -     Medication:   Cyclobenzaprine 10 mg, 1 tid prn    Pharmacy:   [facility]    Does the patient have enough for 3 days?   [] Yes   [x] No - Send as HP to POD

## 2024-07-26 NOTE — DISCHARGE INSTRUCTIONS
Use medication as prescribed  Follow-up with comprehensive spine in outpatient setting  Return to emergency department if you develop increasing pain of the affected extremity, numbness, tingling, or weakness or inability to move arm.

## 2024-07-28 ENCOUNTER — APPOINTMENT (EMERGENCY)
Dept: RADIOLOGY | Facility: HOSPITAL | Age: 69
DRG: 871 | End: 2024-07-28
Payer: MEDICARE

## 2024-07-28 ENCOUNTER — HOSPITAL ENCOUNTER (INPATIENT)
Facility: HOSPITAL | Age: 69
LOS: 5 days | Discharge: HOME | DRG: 871 | End: 2024-08-03
Attending: STUDENT IN AN ORGANIZED HEALTH CARE EDUCATION/TRAINING PROGRAM | Admitting: INTERNAL MEDICINE
Payer: MEDICARE

## 2024-07-28 ENCOUNTER — APPOINTMENT (EMERGENCY)
Dept: CT IMAGING | Facility: HOSPITAL | Age: 69
DRG: 871 | End: 2024-07-28
Payer: MEDICARE

## 2024-07-28 DIAGNOSIS — R09.02 HYPOXIA: ICD-10-CM

## 2024-07-28 DIAGNOSIS — R07.9 CHEST PAIN: Primary | ICD-10-CM

## 2024-07-28 DIAGNOSIS — R21 RASH: ICD-10-CM

## 2024-07-28 DIAGNOSIS — M54.2 NECK PAIN: ICD-10-CM

## 2024-07-28 DIAGNOSIS — R06.02 SHORTNESS OF BREATH: ICD-10-CM

## 2024-07-28 LAB
ALBUMIN SERPL BCG-MCNC: 3.9 G/DL (ref 3.5–5)
ALP SERPL-CCNC: 92 U/L (ref 34–104)
ALT SERPL W P-5'-P-CCNC: 43 U/L (ref 7–52)
ANION GAP SERPL CALCULATED.3IONS-SCNC: 16 MMOL/L (ref 4–13)
AST SERPL W P-5'-P-CCNC: 29 U/L (ref 13–39)
BASOPHILS # BLD MANUAL: 0 THOUSAND/UL (ref 0–0.1)
BASOPHILS NFR MAR MANUAL: 0 % (ref 0–1)
BILIRUB SERPL-MCNC: 0.66 MG/DL (ref 0.2–1)
BNP SERPL-MCNC: 113 PG/ML (ref 0–100)
BUN SERPL-MCNC: 27 MG/DL (ref 5–25)
CALCIUM SERPL-MCNC: 9.9 MG/DL (ref 8.4–10.2)
CARDIAC TROPONIN I PNL SERPL HS: 31 NG/L
CHLORIDE SERPL-SCNC: 96 MMOL/L (ref 96–108)
CO2 SERPL-SCNC: 21 MMOL/L (ref 21–32)
CREAT SERPL-MCNC: 1.18 MG/DL (ref 0.6–1.3)
D DIMER PPP FEU-MCNC: 1.79 UG/ML FEU
EOSINOPHIL # BLD MANUAL: 0 THOUSAND/UL (ref 0–0.4)
EOSINOPHIL NFR BLD MANUAL: 0 % (ref 0–6)
ERYTHROCYTE [DISTWIDTH] IN BLOOD BY AUTOMATED COUNT: 16.8 % (ref 11.6–15.1)
FLUAV RNA RESP QL NAA+PROBE: NEGATIVE
FLUBV RNA RESP QL NAA+PROBE: NEGATIVE
GFR SERPL CREATININE-BSD FRML MDRD: 63 ML/MIN/1.73SQ M
GLUCOSE SERPL-MCNC: 224 MG/DL (ref 65–140)
HCT VFR BLD AUTO: 43.9 % (ref 36.5–49.3)
HGB BLD-MCNC: 13.6 G/DL (ref 12–17)
LACTATE SERPL-SCNC: 1.3 MMOL/L (ref 0.5–2)
LYMPHOCYTES # BLD AUTO: 0.57 THOUSAND/UL (ref 0.6–4.47)
LYMPHOCYTES # BLD AUTO: 2 % (ref 14–44)
MCH RBC QN AUTO: 25.3 PG (ref 26.8–34.3)
MCHC RBC AUTO-ENTMCNC: 31 G/DL (ref 31.4–37.4)
MCV RBC AUTO: 82 FL (ref 82–98)
MONOCYTES # BLD AUTO: 1.43 THOUSAND/UL (ref 0–1.22)
MONOCYTES NFR BLD: 5 % (ref 4–12)
NEUTROPHILS # BLD MANUAL: 26.63 THOUSAND/UL (ref 1.85–7.62)
NEUTS BAND NFR BLD MANUAL: 3 % (ref 0–8)
NEUTS SEG NFR BLD AUTO: 90 % (ref 43–75)
PLATELET # BLD AUTO: 250 THOUSANDS/UL (ref 149–390)
PLATELET BLD QL SMEAR: ADEQUATE
PMV BLD AUTO: 10.5 FL (ref 8.9–12.7)
POTASSIUM SERPL-SCNC: 4.4 MMOL/L (ref 3.5–5.3)
PROCALCITONIN SERPL-MCNC: 0.59 NG/ML
PROT SERPL-MCNC: 8.4 G/DL (ref 6.4–8.4)
RBC # BLD AUTO: 5.38 MILLION/UL (ref 3.88–5.62)
RBC MORPH BLD: NORMAL
RSV RNA RESP QL NAA+PROBE: NEGATIVE
SARS-COV-2 RNA RESP QL NAA+PROBE: NEGATIVE
SODIUM SERPL-SCNC: 133 MMOL/L (ref 135–147)
WBC # BLD AUTO: 28.63 THOUSAND/UL (ref 4.31–10.16)

## 2024-07-28 PROCEDURE — 87154 CUL TYP ID BLD PTHGN 6+ TRGT: CPT | Performed by: STUDENT IN AN ORGANIZED HEALTH CARE EDUCATION/TRAINING PROGRAM

## 2024-07-28 PROCEDURE — 93005 ELECTROCARDIOGRAM TRACING: CPT

## 2024-07-28 PROCEDURE — 96365 THER/PROPH/DIAG IV INF INIT: CPT

## 2024-07-28 PROCEDURE — 71275 CT ANGIOGRAPHY CHEST: CPT

## 2024-07-28 PROCEDURE — 87147 CULTURE TYPE IMMUNOLOGIC: CPT | Performed by: STUDENT IN AN ORGANIZED HEALTH CARE EDUCATION/TRAINING PROGRAM

## 2024-07-28 PROCEDURE — 87186 SC STD MICRODIL/AGAR DIL: CPT | Performed by: STUDENT IN AN ORGANIZED HEALTH CARE EDUCATION/TRAINING PROGRAM

## 2024-07-28 PROCEDURE — 99285 EMERGENCY DEPT VISIT HI MDM: CPT

## 2024-07-28 PROCEDURE — 36415 COLL VENOUS BLD VENIPUNCTURE: CPT | Performed by: STUDENT IN AN ORGANIZED HEALTH CARE EDUCATION/TRAINING PROGRAM

## 2024-07-28 PROCEDURE — 87040 BLOOD CULTURE FOR BACTERIA: CPT | Performed by: STUDENT IN AN ORGANIZED HEALTH CARE EDUCATION/TRAINING PROGRAM

## 2024-07-28 PROCEDURE — 71046 X-RAY EXAM CHEST 2 VIEWS: CPT

## 2024-07-28 PROCEDURE — 84484 ASSAY OF TROPONIN QUANT: CPT | Performed by: STUDENT IN AN ORGANIZED HEALTH CARE EDUCATION/TRAINING PROGRAM

## 2024-07-28 PROCEDURE — 83605 ASSAY OF LACTIC ACID: CPT | Performed by: STUDENT IN AN ORGANIZED HEALTH CARE EDUCATION/TRAINING PROGRAM

## 2024-07-28 PROCEDURE — 85007 BL SMEAR W/DIFF WBC COUNT: CPT | Performed by: STUDENT IN AN ORGANIZED HEALTH CARE EDUCATION/TRAINING PROGRAM

## 2024-07-28 PROCEDURE — 96361 HYDRATE IV INFUSION ADD-ON: CPT

## 2024-07-28 PROCEDURE — 80053 COMPREHEN METABOLIC PANEL: CPT | Performed by: STUDENT IN AN ORGANIZED HEALTH CARE EDUCATION/TRAINING PROGRAM

## 2024-07-28 PROCEDURE — 83880 ASSAY OF NATRIURETIC PEPTIDE: CPT

## 2024-07-28 PROCEDURE — 0241U HB NFCT DS VIR RESP RNA 4 TRGT: CPT | Performed by: STUDENT IN AN ORGANIZED HEALTH CARE EDUCATION/TRAINING PROGRAM

## 2024-07-28 PROCEDURE — 82550 ASSAY OF CK (CPK): CPT | Performed by: INTERNAL MEDICINE

## 2024-07-28 PROCEDURE — 85027 COMPLETE CBC AUTOMATED: CPT | Performed by: STUDENT IN AN ORGANIZED HEALTH CARE EDUCATION/TRAINING PROGRAM

## 2024-07-28 PROCEDURE — 85379 FIBRIN DEGRADATION QUANT: CPT | Performed by: STUDENT IN AN ORGANIZED HEALTH CARE EDUCATION/TRAINING PROGRAM

## 2024-07-28 PROCEDURE — 84145 PROCALCITONIN (PCT): CPT

## 2024-07-28 PROCEDURE — 99285 EMERGENCY DEPT VISIT HI MDM: CPT | Performed by: STUDENT IN AN ORGANIZED HEALTH CARE EDUCATION/TRAINING PROGRAM

## 2024-07-28 RX ORDER — ACETAMINOPHEN 10 MG/ML
1000 INJECTION, SOLUTION INTRAVENOUS ONCE
Status: COMPLETED | OUTPATIENT
Start: 2024-07-29 | End: 2024-07-29

## 2024-07-28 RX ORDER — ASPIRIN 325 MG
325 TABLET ORAL ONCE
Status: COMPLETED | OUTPATIENT
Start: 2024-07-28 | End: 2024-07-28

## 2024-07-28 RX ADMIN — SODIUM CHLORIDE 1000 ML: 0.9 INJECTION, SOLUTION INTRAVENOUS at 22:32

## 2024-07-28 RX ADMIN — ASPIRIN 325 MG ORAL TABLET 325 MG: 325 PILL ORAL at 22:11

## 2024-07-28 RX ADMIN — CEFTRIAXONE SODIUM 1000 MG: 10 INJECTION, POWDER, FOR SOLUTION INTRAVENOUS at 23:43

## 2024-07-28 RX ADMIN — ACETAMINOPHEN 1000 MG: 10 INJECTION INTRAVENOUS at 23:51

## 2024-07-28 RX ADMIN — IOHEXOL 85 ML: 350 INJECTION, SOLUTION INTRAVENOUS at 22:54

## 2024-07-29 ENCOUNTER — APPOINTMENT (INPATIENT)
Dept: NON INVASIVE DIAGNOSTICS | Facility: HOSPITAL | Age: 69
DRG: 871 | End: 2024-07-29
Payer: MEDICARE

## 2024-07-29 ENCOUNTER — APPOINTMENT (EMERGENCY)
Dept: RADIOLOGY | Facility: HOSPITAL | Age: 69
DRG: 871 | End: 2024-07-29
Payer: MEDICARE

## 2024-07-29 ENCOUNTER — TELEPHONE (OUTPATIENT)
Age: 69
End: 2024-07-29

## 2024-07-29 PROBLEM — E87.1 ACUTE HYPONATREMIA: Status: ACTIVE | Noted: 2024-07-29

## 2024-07-29 PROBLEM — E87.1 HYPONATREMIA: Status: ACTIVE | Noted: 2024-07-29

## 2024-07-29 PROBLEM — Z29.9 ENCOUNTER FOR DEEP VEIN THROMBOSIS (DVT) PROPHYLAXIS: Status: ACTIVE | Noted: 2024-07-29

## 2024-07-29 PROBLEM — A41.9 SEPSIS WITHOUT ACUTE ORGAN DYSFUNCTION (HCC): Status: ACTIVE | Noted: 2024-07-29

## 2024-07-29 PROBLEM — I50.32 CHRONIC DIASTOLIC CHF (CONGESTIVE HEART FAILURE) (HCC): Chronic | Status: ACTIVE | Noted: 2024-07-29

## 2024-07-29 PROBLEM — J96.01 ACUTE HYPOXIC RESPIRATORY FAILURE (HCC): Status: ACTIVE | Noted: 2024-07-29

## 2024-07-29 LAB
2HR DELTA HS TROPONIN: 10 NG/L
4HR DELTA HS TROPONIN: 26 NG/L
ANION GAP SERPL CALCULATED.3IONS-SCNC: 14 MMOL/L (ref 4–13)
AORTIC VALVE MEAN VELOCITY: 24.6 M/S
APICAL FOUR CHAMBER EJECTION FRACTION: 54 %
ATRIAL RATE: 135 BPM
AV AREA BY CONTINUOUS VTI: 1.3 CM2
AV AREA PEAK VELOCITY: 1 CM2
AV LVOT MEAN GRADIENT: 4 MMHG
AV LVOT PEAK GRADIENT: 6 MMHG
AV MEAN GRADIENT: 27 MMHG
AV PEAK GRADIENT: 45 MMHG
AV VALVE AREA: 1.28 CM2
AV VELOCITY RATIO: 0.35
BSA FOR ECHO PROCEDURE: 2.48 M2
BUN SERPL-MCNC: 28 MG/DL (ref 5–25)
CALCIUM SERPL-MCNC: 9.9 MG/DL (ref 8.4–10.2)
CARDIAC TROPONIN I PNL SERPL HS: 41 NG/L
CARDIAC TROPONIN I PNL SERPL HS: 57 NG/L
CHLORIDE SERPL-SCNC: 96 MMOL/L (ref 96–108)
CHOLEST SERPL-MCNC: 90 MG/DL
CK SERPL-CCNC: 28 U/L (ref 39–308)
CO2 SERPL-SCNC: 24 MMOL/L (ref 21–32)
CREAT SERPL-MCNC: 1.52 MG/DL (ref 0.6–1.3)
DOP CALC AO PEAK VEL: 3.4 M/S
DOP CALC AO VTI: 44.5 CM
DOP CALC LVOT AREA: 2.83
DOP CALC LVOT DIAMETER: 1.9 CM
DOP CALC LVOT PEAK VEL VTI: 20.1 CM
DOP CALC LVOT PEAK VEL: 1.18 M/S
DOP CALC LVOT STROKE INDEX: 23 ML/M2
DOP CALC LVOT STROKE VOLUME: 56.96
ERYTHROCYTE [DISTWIDTH] IN BLOOD BY AUTOMATED COUNT: 16.8 % (ref 11.6–15.1)
GFR SERPL CREATININE-BSD FRML MDRD: 46 ML/MIN/1.73SQ M
GLUCOSE SERPL-MCNC: 147 MG/DL (ref 65–140)
GLUCOSE SERPL-MCNC: 159 MG/DL (ref 65–140)
GLUCOSE SERPL-MCNC: 170 MG/DL (ref 65–140)
GLUCOSE SERPL-MCNC: 187 MG/DL (ref 65–140)
GLUCOSE SERPL-MCNC: 196 MG/DL (ref 65–140)
GLUCOSE SERPL-MCNC: 230 MG/DL (ref 65–140)
GLUCOSE SERPL-MCNC: 236 MG/DL (ref 65–140)
GLUCOSE SERPL-MCNC: 261 MG/DL (ref 65–140)
GLUCOSE SERPL-MCNC: 274 MG/DL (ref 65–140)
HCT VFR BLD AUTO: 40.2 % (ref 36.5–49.3)
HDLC SERPL-MCNC: 26 MG/DL
HGB BLD-MCNC: 12.8 G/DL (ref 12–17)
LACTATE SERPL-SCNC: 1 MMOL/L (ref 0.5–2)
LACTATE SERPL-SCNC: 1.4 MMOL/L (ref 0.5–2)
LACTATE SERPL-SCNC: 2.1 MMOL/L (ref 0.5–2)
LDLC SERPL CALC-MCNC: 35 MG/DL (ref 0–100)
MCH RBC QN AUTO: 25.7 PG (ref 26.8–34.3)
MCHC RBC AUTO-ENTMCNC: 31.8 G/DL (ref 31.4–37.4)
MCV RBC AUTO: 81 FL (ref 82–98)
P AXIS: 26 DEGREES
PLATELET # BLD AUTO: 214 THOUSANDS/UL (ref 149–390)
PMV BLD AUTO: 9.8 FL (ref 8.9–12.7)
POTASSIUM SERPL-SCNC: 4.5 MMOL/L (ref 3.5–5.3)
PR INTERVAL: 174 MS
PROCALCITONIN SERPL-MCNC: 2.95 NG/ML
PROCALCITONIN SERPL-MCNC: 3.81 NG/ML
QRS AXIS: 1 DEGREES
QRSD INTERVAL: 82 MS
QT INTERVAL: 280 MS
QTC INTERVAL: 420 MS
RBC # BLD AUTO: 4.98 MILLION/UL (ref 3.88–5.62)
SL CV LV EF: 60
SODIUM SERPL-SCNC: 134 MMOL/L (ref 135–147)
T WAVE AXIS: 83 DEGREES
TRIGL SERPL-MCNC: 147 MG/DL
VENTRICULAR RATE: 135 BPM
WBC # BLD AUTO: 27.41 THOUSAND/UL (ref 4.31–10.16)

## 2024-07-29 PROCEDURE — 87147 CULTURE TYPE IMMUNOLOGIC: CPT | Performed by: INTERNAL MEDICINE

## 2024-07-29 PROCEDURE — 84145 PROCALCITONIN (PCT): CPT | Performed by: INTERNAL MEDICINE

## 2024-07-29 PROCEDURE — 87205 SMEAR GRAM STAIN: CPT | Performed by: INTERNAL MEDICINE

## 2024-07-29 PROCEDURE — 93308 TTE F-UP OR LMTD: CPT

## 2024-07-29 PROCEDURE — 80048 BASIC METABOLIC PNL TOTAL CA: CPT | Performed by: INTERNAL MEDICINE

## 2024-07-29 PROCEDURE — 84484 ASSAY OF TROPONIN QUANT: CPT | Performed by: STUDENT IN AN ORGANIZED HEALTH CARE EDUCATION/TRAINING PROGRAM

## 2024-07-29 PROCEDURE — 85027 COMPLETE CBC AUTOMATED: CPT | Performed by: INTERNAL MEDICINE

## 2024-07-29 PROCEDURE — 87186 SC STD MICRODIL/AGAR DIL: CPT | Performed by: INTERNAL MEDICINE

## 2024-07-29 PROCEDURE — 93321 DOPPLER ECHO F-UP/LMTD STD: CPT | Performed by: INTERNAL MEDICINE

## 2024-07-29 PROCEDURE — 80061 LIPID PANEL: CPT | Performed by: INTERNAL MEDICINE

## 2024-07-29 PROCEDURE — 73630 X-RAY EXAM OF FOOT: CPT

## 2024-07-29 PROCEDURE — 99223 1ST HOSP IP/OBS HIGH 75: CPT | Performed by: INTERNAL MEDICINE

## 2024-07-29 PROCEDURE — 93325 DOPPLER ECHO COLOR FLOW MAPG: CPT

## 2024-07-29 PROCEDURE — 82948 REAGENT STRIP/BLOOD GLUCOSE: CPT

## 2024-07-29 PROCEDURE — 93308 TTE F-UP OR LMTD: CPT | Performed by: INTERNAL MEDICINE

## 2024-07-29 PROCEDURE — 87070 CULTURE OTHR SPECIMN AEROBIC: CPT | Performed by: INTERNAL MEDICINE

## 2024-07-29 PROCEDURE — 83605 ASSAY OF LACTIC ACID: CPT | Performed by: INTERNAL MEDICINE

## 2024-07-29 PROCEDURE — 93321 DOPPLER ECHO F-UP/LMTD STD: CPT

## 2024-07-29 PROCEDURE — 93325 DOPPLER ECHO COLOR FLOW MAPG: CPT | Performed by: INTERNAL MEDICINE

## 2024-07-29 PROCEDURE — 96367 TX/PROPH/DG ADDL SEQ IV INF: CPT

## 2024-07-29 PROCEDURE — 93010 ELECTROCARDIOGRAM REPORT: CPT | Performed by: INTERNAL MEDICINE

## 2024-07-29 RX ORDER — VANCOMYCIN HYDROCHLORIDE 1 G/200ML
1000 INJECTION, SOLUTION INTRAVENOUS EVERY 12 HOURS
Status: DISCONTINUED | OUTPATIENT
Start: 2024-07-29 | End: 2024-07-31

## 2024-07-29 RX ORDER — AMLODIPINE BESYLATE 10 MG/1
10 TABLET ORAL DAILY
Status: DISCONTINUED | OUTPATIENT
Start: 2024-07-29 | End: 2024-08-03 | Stop reason: HOSPADM

## 2024-07-29 RX ORDER — ACETAMINOPHEN 325 MG/1
650 TABLET ORAL EVERY 6 HOURS PRN
Status: DISCONTINUED | OUTPATIENT
Start: 2024-07-29 | End: 2024-08-01

## 2024-07-29 RX ORDER — ASPIRIN 81 MG/1
81 TABLET, CHEWABLE ORAL DAILY
Status: DISCONTINUED | OUTPATIENT
Start: 2024-07-29 | End: 2024-08-03 | Stop reason: HOSPADM

## 2024-07-29 RX ORDER — HYDROMORPHONE HCL IN WATER/PF 6 MG/30 ML
0.2 PATIENT CONTROLLED ANALGESIA SYRINGE INTRAVENOUS EVERY 2 HOUR PRN
Status: DISCONTINUED | OUTPATIENT
Start: 2024-07-29 | End: 2024-08-03 | Stop reason: HOSPADM

## 2024-07-29 RX ORDER — CYCLOBENZAPRINE HCL 10 MG
10 TABLET ORAL 3 TIMES DAILY PRN
Status: DISCONTINUED | OUTPATIENT
Start: 2024-07-29 | End: 2024-08-02

## 2024-07-29 RX ORDER — LOSARTAN POTASSIUM 50 MG/1
100 TABLET ORAL DAILY
Status: DISCONTINUED | OUTPATIENT
Start: 2024-07-29 | End: 2024-08-03 | Stop reason: HOSPADM

## 2024-07-29 RX ORDER — MORPHINE SULFATE 4 MG/ML
4 INJECTION, SOLUTION INTRAMUSCULAR; INTRAVENOUS EVERY 4 HOURS PRN
Status: DISCONTINUED | OUTPATIENT
Start: 2024-07-29 | End: 2024-07-29

## 2024-07-29 RX ORDER — INSULIN LISPRO 100 [IU]/ML
2-12 INJECTION, SOLUTION INTRAVENOUS; SUBCUTANEOUS
Status: DISCONTINUED | OUTPATIENT
Start: 2024-07-29 | End: 2024-08-03 | Stop reason: HOSPADM

## 2024-07-29 RX ORDER — HEPARIN SODIUM 5000 [USP'U]/ML
5000 INJECTION, SOLUTION INTRAVENOUS; SUBCUTANEOUS EVERY 8 HOURS
Status: DISCONTINUED | OUTPATIENT
Start: 2024-07-29 | End: 2024-08-03 | Stop reason: HOSPADM

## 2024-07-29 RX ORDER — METOPROLOL SUCCINATE 100 MG/1
100 TABLET, EXTENDED RELEASE ORAL EVERY EVENING
Status: DISCONTINUED | OUTPATIENT
Start: 2024-07-29 | End: 2024-08-03 | Stop reason: HOSPADM

## 2024-07-29 RX ORDER — INSULIN LISPRO 100 [IU]/ML
2-12 INJECTION, SOLUTION INTRAVENOUS; SUBCUTANEOUS EVERY 6 HOURS
Status: DISCONTINUED | OUTPATIENT
Start: 2024-07-29 | End: 2024-07-29

## 2024-07-29 RX ORDER — MORPHINE SULFATE 4 MG/ML
4 INJECTION, SOLUTION INTRAMUSCULAR; INTRAVENOUS ONCE
Status: COMPLETED | OUTPATIENT
Start: 2024-07-29 | End: 2024-07-29

## 2024-07-29 RX ORDER — OXYCODONE HYDROCHLORIDE 5 MG/1
5 TABLET ORAL EVERY 4 HOURS PRN
Status: DISCONTINUED | OUTPATIENT
Start: 2024-07-29 | End: 2024-08-01

## 2024-07-29 RX ORDER — MORPHINE SULFATE 4 MG/ML
4 INJECTION, SOLUTION INTRAMUSCULAR; INTRAVENOUS EVERY 4 HOURS PRN
Status: DISCONTINUED | OUTPATIENT
Start: 2024-07-29 | End: 2024-08-03 | Stop reason: HOSPADM

## 2024-07-29 RX ORDER — NITROGLYCERIN 0.4 MG/1
0.4 TABLET SUBLINGUAL
Status: DISCONTINUED | OUTPATIENT
Start: 2024-07-29 | End: 2024-08-03 | Stop reason: HOSPADM

## 2024-07-29 RX ADMIN — AMLODIPINE BESYLATE 10 MG: 10 TABLET ORAL at 08:37

## 2024-07-29 RX ADMIN — ACETAMINOPHEN 650 MG: 325 TABLET, FILM COATED ORAL at 22:53

## 2024-07-29 RX ADMIN — HYDROMORPHONE HYDROCHLORIDE 0.2 MG: 0.2 INJECTION, SOLUTION INTRAMUSCULAR; INTRAVENOUS; SUBCUTANEOUS at 12:19

## 2024-07-29 RX ADMIN — INSULIN LISPRO 2 UNITS: 100 INJECTION, SOLUTION INTRAVENOUS; SUBCUTANEOUS at 08:35

## 2024-07-29 RX ADMIN — OXYCODONE HYDROCHLORIDE 5 MG: 5 TABLET ORAL at 17:13

## 2024-07-29 RX ADMIN — INSULIN LISPRO 4 UNITS: 100 INJECTION, SOLUTION INTRAVENOUS; SUBCUTANEOUS at 12:13

## 2024-07-29 RX ADMIN — SODIUM CHLORIDE, SODIUM LACTATE, POTASSIUM CHLORIDE, AND CALCIUM CHLORIDE 1000 ML: .6; .31; .03; .02 INJECTION, SOLUTION INTRAVENOUS at 21:35

## 2024-07-29 RX ADMIN — MORPHINE SULFATE 4 MG: 4 INJECTION INTRAVENOUS at 03:12

## 2024-07-29 RX ADMIN — HEPARIN SODIUM 5000 UNITS: 5000 INJECTION INTRAVENOUS; SUBCUTANEOUS at 05:29

## 2024-07-29 RX ADMIN — SODIUM CHLORIDE, SODIUM LACTATE, POTASSIUM CHLORIDE, AND CALCIUM CHLORIDE 1000 ML: .6; .31; .03; .02 INJECTION, SOLUTION INTRAVENOUS at 18:43

## 2024-07-29 RX ADMIN — CEFTRIAXONE SODIUM 2000 MG: 10 INJECTION, POWDER, FOR SOLUTION INTRAVENOUS at 22:00

## 2024-07-29 RX ADMIN — ASPIRIN 81 MG: 81 TABLET, CHEWABLE ORAL at 08:37

## 2024-07-29 RX ADMIN — EMPAGLIFLOZIN 25 MG: 25 TABLET, FILM COATED ORAL at 11:13

## 2024-07-29 RX ADMIN — MORPHINE SULFATE 4 MG: 4 INJECTION INTRAVENOUS at 07:21

## 2024-07-29 RX ADMIN — VANCOMYCIN HYDROCHLORIDE 1000 MG: 1 INJECTION, SOLUTION INTRAVENOUS at 15:47

## 2024-07-29 RX ADMIN — INSULIN LISPRO 4 UNITS: 100 INJECTION, SOLUTION INTRAVENOUS; SUBCUTANEOUS at 16:29

## 2024-07-29 RX ADMIN — OXYCODONE HYDROCHLORIDE 5 MG: 5 TABLET ORAL at 08:36

## 2024-07-29 RX ADMIN — VANCOMYCIN HYDROCHLORIDE 2000 MG: 5 INJECTION, POWDER, LYOPHILIZED, FOR SOLUTION INTRAVENOUS at 03:16

## 2024-07-29 RX ADMIN — SODIUM CHLORIDE, SODIUM LACTATE, POTASSIUM CHLORIDE, AND CALCIUM CHLORIDE 1000 ML: .6; .31; .03; .02 INJECTION, SOLUTION INTRAVENOUS at 18:07

## 2024-07-29 RX ADMIN — HEPARIN SODIUM 5000 UNITS: 5000 INJECTION INTRAVENOUS; SUBCUTANEOUS at 21:36

## 2024-07-29 RX ADMIN — HEPARIN SODIUM 5000 UNITS: 5000 INJECTION INTRAVENOUS; SUBCUTANEOUS at 14:15

## 2024-07-29 RX ADMIN — LOSARTAN POTASSIUM 100 MG: 50 TABLET, FILM COATED ORAL at 08:36

## 2024-07-29 RX ADMIN — SODIUM CHLORIDE, SODIUM LACTATE, POTASSIUM CHLORIDE, AND CALCIUM CHLORIDE 1000 ML: .6; .31; .03; .02 INJECTION, SOLUTION INTRAVENOUS at 19:52

## 2024-07-29 RX ADMIN — CYCLOBENZAPRINE HYDROCHLORIDE 10 MG: 10 TABLET, FILM COATED ORAL at 22:53

## 2024-07-29 NOTE — ASSESSMENT & PLAN NOTE
Patient is afebrile at 100.2 °F (07/28/2024, 9:44 PM), but I note that patient also carries a diagnosis of follicular B-cell lymphoma (diagnosed in 2020 by [facility] Dr. Naomi Cummings, [facility]), s/p chemotherapy x 6 months, ended in Feburary 2022, now in clinical remission; hence, patient is immunosuppressed and may be incapable of mounting a paula mayito fever of 100.4 °F or greater.    In addition, patient has an admitting heart rate of 135 bpm and a respiratory rate of 20 breaths/min, saturating 95% on room air (07/28/2024, 9:44 PM) with repeat O2 saturation 88% on room air (07/28/2024, 10:30 PM), and repeat O2 saturation 95% on 4 liters/minute O2 via NC (07/28/2024, 10:31pm).   Exam was noted for a chest that was clear to auscultation and nontender to palpation.  In addition, patient's right sole was noted for an open 1 cm ulcer at the ball of patient's right sole and which was negative for erythema (cf., the patient's entire right foot and right lower shin were actually dark brown/cyanotic), edema, induration, warmth (cf., the patient's entire right foot and right lower shin were actually cold to touch), tenderness, crepitus, fluctuance, serous/sanguinous/suppurative discharge, malodor, and lymphangitic streaking.    Labs were noted for an elevated white blood cell count of 28.63, neutrophils 90%, bands 3%, lymphocytes 2%, and monocytes 5% (07/28/2024, 10:18 PM).  Patient also has an elevated procalcitonin #1 0.59 ng/mL and a normal lactic acid #1 1.3 mmol/L (07/28/2024, 10:18pm)    Additional testing included CTA chest (07/28/2024, 11:38pm):      1.  No acute pulmonary embolism to the segmental level with evaluation of the more peripheral subsegmental branches limited by timing of contrast bolus and inadequate contrast opacification.  2.  Ectatic ascending thoracic aorta again noted measuring up to 4.6 cm in diameter, not significantly changed. No aortic dissection.  No  pericardial effusion.  3.  Diffuse wall thickening of the left ventricle including interventricular septum noted suggesting left ventricular hypertrophy.  4.  No lobar airspace consolidation/pneumonia.   5.  No pleural or pericardial effusions.      Patient was subsequently admitted to the inpatient hospitalist service at [facility] ([facility]) on 07/28/2024 with the following diagnosis:    1.  Sepsis of unclear etiology, R/O bacteremia.    To address #1, patient was started empirically on ceftriaxone 1 g IV x 1 dose (07/28/2024, 11:30 PM) in [facility] ER ([facility]).  Patient will continue with ceftriaxone 2 g IV daily (day 1 of 3 on 07/29/2024, 11 PM) and vancomycin 1 g IV q12 (day #1/3 on 07/29/2024, 3:00 PM) in [facility] Telemetry bed #431-1, as patient may be suffering from an incipient CAP that is not yet visualized on CTA chest 07/28/2024, 11:38pm), which I think is unlikely, or patient may be suffering from acute bacteremia with the portal of entry being the right sole/ball.    I will check repeat vitals, chest exam, right sole/ball exam, WBC with differential, lactic acid, procalcitonin, surveillance blood cultures #1 and #2 (07/28/2024, 11:39pm), and surveillance right ball of foot wound  culture in the 07/29/2024 am.

## 2024-07-29 NOTE — ASSESSMENT & PLAN NOTE
Patient is afebrile at 100.2 °F (07/28/2024, 9:44 PM), but I note that patient also carries a diagnosis of follicular B-cell lymphoma (diagnosed in 2020 by [facility] Dr. Naomi Cummings, [facility]), s/p chemotherapy x 6 months, ended in Feburary 2022, now in clinical remission; hence, patient is immunosuppressed and may be incapable of mounting a paula mayito fever of 100.4 °F or greater.    In addition, patient has an admitting heart rate of 135 bpm and a respiratory rate of 20 breaths/min, saturating 95% on room air (07/28/2024, 9:44 PM) with repeat O2 saturation 88% on room air (07/28/2024, 10:30 PM), and repeat O2 saturation 95% on 4 liters/minute O2 via NC (07/28/2024, 10:31pm).   Exam was noted for a chest that was clear to auscultation and nontender to palpation.  In addition, patient's right sole was noted for an open 1 cm ulcer at the ball of patient's right sole and which was negative for erythema (cf., the patient's entire right foot and right lower shin were actually dark brown/cyanotic), edema, induration, warmth (cf., the patient's entire right foot and right lower shin were actually cold to touch), tenderness, crepitus, fluctuance, serous/sanguinous/suppurative discharge, malodor, and lymphangitic streaking.    Labs were noted for an elevated white blood cell count of 28.63, neutrophils 90%, bands 3%, lymphocytes 2%, and monocytes 5% (07/28/2024, 10:18 PM).  Patient also has an elevated procalcitonin #1 0.59 ng/mL and a normal lactic acid #1 1.3 mmol/L (07/28/2024, 10:18pm)    Additional testing included CTA chest (07/28/2024, 11:38pm):      1.  No acute pulmonary embolism to the segmental level with evaluation of the more peripheral subsegmental branches limited by timing of contrast bolus and inadequate contrast opacification.  2.  Ectatic ascending thoracic aorta again noted measuring up to 4.6 cm in diameter, not significantly changed. No aortic dissection.  No  pericardial effusion.  3.  Diffuse wall thickening of the left ventricle including interventricular septum noted suggesting left ventricular hypertrophy.  4.  No lobar airspace consolidation/pneumonia.   5.  No pleural or pericardial effusions.      Patient was subsequently admitted to the inpatient hospitalist service at [facility] ([facility]) on 07/28/2024 with the following diagnosis:    1.  Acute hypoxic respiratory failure, R/O bacteremia.    To address #1, patient was started empirically on 4 liters/minute O2 via nasal cannula in this patient who is normally oxygen-naive at home and has no history of tobacco abuse (primary/secondary), and no history of asthma, COPD, or intubation/mechanical ventilation.     Patient was also started on ceftriaxone 1 g IV x 1 dose (07/28/2024, 11:30 PM) in [facility] ER ([facility]).  Patient will continue with ceftriaxone 2 g IV daily (day 1 of 3 on 07/29/2024, 11 PM) and vancomycin 1 g IV q12 (day #1/3 on 07/29/2024, 3:00 PM) [facility], as patient may be suffering from an incipient CAP that is not yet visualized on CTA chest 07/28/2024, 11:38pm), which I think is unlikely, or patient may be suffering from acute bacteremia with the portal of entry being the right sole/ball.    I will check repeat vitals, chest exam, right sole/ball exam, WBC with differential, lactic acid, procalcitonin, surveillance blood cultures #1 and #2 (07/28/2024, 11:39pm), and surveillance right ball of foot wound  culture in the 07/29/2024 am.

## 2024-07-29 NOTE — QUICK NOTE
Patient admitted this morning with chief complaint of fever 100.2 °F.  He met SIRS criteria with possible source of infection from bacteremia.    Patient seen examined bedside this morning.  Patient denies any fever, chills, chest pain, shortness of breath, nausea, vomiting.  Patient still reports some pain in the neck and shoulders.  Tried oxycodone for neck pain this morning.    Plans:  Continue ceftriaxone  Follow-up blood cultures  Continue pain meds  Monitor electrolytes

## 2024-07-29 NOTE — TREATMENT PLAN
"Patient seen and examined at bedside.  Does not have any fever right now.  Continue intravenous antibiotics and closely monitor the patient    Patient does have sepsis of unclear etiology  Chronic diastolic congestive heart failure  Acute hyponatremia which is borderline  Acute hypoxic respiratory failure in the background of malignancy with follicular B-cell lymphoma    Blood pressure 113/70, pulse (!) 115, temperature 98.7 °F (37.1 °C), resp. rate 17, height 6' 4\" (1.93 m), weight 118 kg (260 lb 2.3 oz), SpO2 97%.    "

## 2024-07-29 NOTE — ASSESSMENT & PLAN NOTE
Start pharmacologic DVT prophylaxis utilizing heparin 5000 units SQ every 8 hours (07/29/2024, 6 AM).  Of note, patient has no complaints of calf pain, leg swelling, or pleurisy, to suggest either DVT or PE on admission date 07/28/2024.

## 2024-07-29 NOTE — WOUND OSTOMY CARE
Progress Note - Wound   Augustus Coffman 68 y.o. male MRN: [id number]  Unit/Bed#: -01 Encounter: [id number]      Assessment:   Patient admitted to [facility] due to sepsis. History of CHF, diabetes. Wound care consulted for foot wound. Patient is agreeable to assessment, alert and oriented x4, continent of bowel and bladder, assist of 1 with walker to stand with walker, is an assist with care, is OOB to chair with EHOB waffle cushion in place.    1. Patient declined assessment of sacrum and buttock.    2. Bilateral heels- skin is dry, intact, blanchable.    3. Right plantar foot wound- Chronic wound, patient follows with out-patient wound center. Wound is oval in shape, partial thickness, 100% pink tissue, with small amount of serosanguineous drainage noted. Delisa-wound is dry, intact, brown and yellow calloused skin, no redness, no warmth.  -Patient wound like to continue with out-patient wound treatment plan: Mepilex up.    Educated patient on importance of frequent offloading of pressure via turning, repositioning, and weight-shifting. Verbalized understanding of plan of care.    No induration, fluctuance, odor, warmth, redness, or purulence noted to the above noted wound. New dressings applied. Patient tolerated well, reports mild pain to the wound. Primary nurse aware of plan of care. See flow sheets for more detailed assessment findings. Will follow along.      Skin care plans:  1-Hydraguard to bilateral sacrum, buttock and heels BID and PRN  2-Elevate heels to offload pressure.  3-Ehob cushion in chair when out of bed.  4-Moisturize skin daily with skin nourishing cream.  5-Right foot- Cleanse wound with NSS, pat dry. Apply piece of Mepilex Up to cover wound bed, then cover with 4x4 and wrap with Julisa. Change daily and as needed for soilage/displacement.       Wound 09/07/23 Diabetic Ulcer Foot Right;Plantar;Posterior (Active)   Wound Image   07/29/24 1119   Wound Description Pink 07/29/24 1119   Delisa-wound  Assessment Dry;Intact;Callus 07/29/24 1119   Wound Length (cm) 1.2 cm 07/29/24 1119   Wound Width (cm) 1.7 cm 07/29/24 1119   Wound Depth (cm) 0.1 cm 07/29/24 1119   Wound Surface Area (cm^2) 2.04 cm^2 07/29/24 1119   Wound Volume (cm^3) 0.204 cm^3 07/29/24 1119   Calculated Wound Volume (cm^3) 0.2 cm^3 07/29/24 1119   Change in Wound Size % 80 07/29/24 1119   Drainage Amount Small 07/29/24 1119   Drainage Description Serosanguineous 07/29/24 1119   Non-staged Wound Description Partial thickness 07/29/24 1119   Treatments Cleansed;Irrigation with NSS;Site care 07/29/24 1119   Dressing Other (Comment);Dry dressing 07/29/24 1119   Wound packed? No 07/29/24 1119   Dressing Changed Changed 07/29/24 1119   Patient Tolerance Tolerated well 07/29/24 1119   Dressing Status Clean;Dry;Intact 07/29/24 1119       Contact through [employer] Secure Chat with any questions  Wound Care will continue to follow    Lynsey CANDELARIAN RN CWON  Wound and Ostomy care

## 2024-07-29 NOTE — ASSESSMENT & PLAN NOTE
Hold off patient's home-scheduled rosuvastatin 40 mg p.o. every afternoon in the event that this medication is contributing to the patient's complaints of acute left-sided posterior neck pain and acute left-sided anterior shoulder pain on admitting date 07/28/2024.  In addition, patient awaits CK level testing to evaluate this patient for possible acute rhabdomyolysis if not acute myopathy associated with this statin class of medication.

## 2024-07-29 NOTE — ED NOTES
Notified provider pt is tachy and hypoxic. Pt placed on 4lnc oxygen. O2 sat now 94% on 4lnc. -130s. + fever at home.     Sadie Wheeler RN  07/28/24 2675

## 2024-07-29 NOTE — ASSESSMENT & PLAN NOTE
Acute hypovolemic hyponatremia with admitting Na 133 mmol/L (07/28/2024, 10:18pm).  Cf., repeat Na 134 mmol/L (07/29/2024, 4:15am).      Cf., baseline Na range, 136-142 mmol/L (10/08/2016 - 07/11/2024).    Etiology of acute hypovolemic hyponatremia is most probably due to patient's home-scheduled HCTZ 25 mg p.o. daily and the associated HCTZ-mediated natri-uresis.  Hence, I have opted to hold off this medication on admitting date 07/28/2024 and on subsequent date 07/29/2024.  I will check repeat sodium level in 07/30/2024, 6:00am.

## 2024-07-29 NOTE — PROGRESS NOTES
Augustus Coffman is a 68 y.o. male who is currently ordered Vancomycin IV with management by the Pharmacy Consult service.  Relevant clinical data and objective / subjective history reviewed.  Vancomycin Assessment:  Indication and Goal AUC/Trough: Pneumonia (goal -600, trough >10); Soft tissue (goal -600, trough >10), -600, trough >10  Clinical Status:  new start  Micro:   pending  Renal Function:  SCr: 1.18 mg/dL  CrCl: 84.3 mL/min  Renal replacement: Not on dialysis  Days of Therapy: 1  Current Dose: 2000mg loading dose  Vancomycin Plan:  New Dosinmg Q12H  Estimated AUC: 472 mcg*hr/mL  Estimated Trough: 15 mcg/mL  Next Level: 24  Renal Function Monitoring: Daily BMP and UOP  Pharmacy will continue to follow closely for s/sx of nephrotoxicity, infusion reactions and appropriateness of therapy.  BMP and CBC will be ordered per protocol. We will continue to follow the patient’s culture results and clinical progress daily.    Gurpreet Branch, Pharmacist

## 2024-07-29 NOTE — PHYSICAL THERAPY NOTE
Physical Therapy Cancellation Note             07/29/24 0900   PT Last Visit   PT Visit Date 07/29/24   Note Type   Note type Cancelled Session   Cancel Reasons Medical status   Additional Comments PT order received and chart review performed. At this time, PT evaluation cancelled due to patient pending xray of foot. PT will follow and evaluate as appropriate.     Dinesh Thompson, PT, DPT

## 2024-07-29 NOTE — ASSESSMENT & PLAN NOTE
Lab Results   Component Value Date    SODIUM 134 (L) 07/29/2024    SODIUM 133 (L) 07/28/2024    SODIUM 136 07/11/2024

## 2024-07-29 NOTE — CASE MANAGEMENT
Case Management Assessment & Discharge Planning Note    Patient name Augustus Coffman  Location /-01 MRN [id number]  : 1955 Date 2024       Current Admission Date: 2024  Current Admission Diagnosis:Sepsis without acute organ dysfunction (Formerly [facility])   Patient Active Problem List    Diagnosis Date Noted Date Diagnosed    Sepsis without acute organ dysfunction (Formerly [facility]) 2024     Acute hypoxic respiratory failure ([facility]) 2024     Acute hyponatremia 2024     Chronic diastolic CHF (congestive heart failure) (Formerly [facility]) 2024     Encounter for deep vein thrombosis (DVT) prophylaxis 2024     Hyponatremia 2024     Aortic stenosis with trileaflet valve 07/15/2024     Ectatic thoracic aorta ([facility]ly [facility]) 2024     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 (Formerly [facility]) 10/09/2023     Vitamin D deficiency 2023     Stage 3a chronic kidney disease (Formerly [facility]) 2023     Follicular lymphoma grade I of lymph nodes of multiple sites ([facility]ly [facility]) 08/15/2022     Obesity, morbid ([facility]ly [facility]) 2022     Other proteinuria 2022     Stage 2 chronic kidney disease 09/15/2021     Hypertensive kidney disease with stage 2 chronic kidney disease 09/15/2021     Rash 2021     Chronic venous hypertension (idiopathic) with ulcer of right lower extremity ([facility]) 2021     GI bleed 2021     Pleural effusion 2021     Heart murmur 2021     Retroperitoneal hematoma 2021     Mesenteric lymphadenopathy 2021     Acute blood loss anemia 2021     FINA (acute kidney injury) ([facility]ly [facility]) 2021     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99         Current admission status: Inpatient       [facility] Pharmacy[facility]   [id number] [address]  [address] [address]  Phone: [phone] Fax: [phone]    [id number] [address]  [address] [address]  Phone: [phone] Fax: [phone]    Primary Care Provider: Gwen Lazo DO    Primary Insurance: MEDICARE  Secondary Insurance: [employer]    ASSESSMENT:  Active Health Care Proxies       Sri Coffman Health Care Representative - Spouse   Primary Phone: [phone] (Mobile)  Home Phone: [phone]  Work Phone: [phone]                 Advance Directives  Does patient have a Health Care POA?: No  Was patient offered paperwork?: Yes (Patient has paperwork to be filled out at home.)  Does patient currently have a Health Care decision maker?: Yes, please see Health Care Proxy section  Does patient have Advance Directives?: No  Was patient offered paperwork?: Yes (Patient has paperwork to be filled out at home.)  Primary Contact: Sri Coffman, Spouse         Readmission Root Cause  30 Day Readmission: No    Patient Information  Admitted from:: Home  Mental Status: Alert  During Assessment patient was accompanied by: Spouse, Daughter  Assessment information provided by:: Patient  Primary Caregiver: Self  Support Systems: Spouse/significant other, Children  County of Residence: [place]  What city do you live in?: [place]  Home entry access options. Select all that apply.: Stairs  Number of steps to enter home.: 4  Do the steps have railings?: Yes  Type of Current Residence: [address] [place] [address] home  Upon entering residence, is there a bedroom on the main floor (no further steps)?: No  A bedroom is located on the  following floor levels of residence (select all that apply):: 2nd Floor  Upon entering residence, is there a bathroom on the main floor (no further steps)?: No  Indicate which floors of current residence have a bathroom (select all the apply):: 2nd Floor  Number of steps to 2nd floor from main floor: 7  Living Arrangements: Lives w/ Spouse/significant other  Is patient a ?: No    Activities of Daily Living Prior to Admission  Functional Status: Independent  Completes ADLs independently?: Yes  Ambulates independently?: Yes  Does patient use assisted devices?: No  Does patient currently own DME?: No  Does patient have a history of Outpatient Therapy (PT/OT)?: No  Does the patient have a history of Short-Term Rehab?: No  Does patient have a history of HHC?: No  Does patient currently have HHC?: No         Patient Information Continued  Income Source: SSI/SSD (Owns business)  Does patient have prescription coverage?: Yes  Does patient receive dialysis treatments?: No  Does patient have a history of substance abuse?: No  Does patient have a history of Mental Health Diagnosis?: No    PHQ 2/9 Screening   Reviewed PHQ 2/9 Depression Screening Score?: No    Means of Transportation  Means of Transport to Appts:: Drives Self      Social Determinants of Health (SDOH)      Flowsheet Row Most Recent Value   Housing Stability    In the last 12 months, was there a time when you were not able to pay the mortgage or rent on time? N   In the past 12 months, how many times have you moved where you were living? 0   At any time in the past 12 months, were you homeless or living in a shelter (including now)? N   Transportation Needs    In the past 12 months, has lack of transportation kept you from medical appointments or from getting medications? no   In the past 12 months, has lack of transportation kept you from meetings, work, or from getting things needed for daily living? No   Food Insecurity    Within the past 12 months, you  worried that your food would run out before you got the money to buy more. Never true   Within the past 12 months, the food you bought just didn't last and you didn't have money to get more. Never true   Utilities    In the past 12 months has the electric, gas, oil, or water company threatened to shut off services in your home? No            DISCHARGE DETAILS:    Discharge planning discussed with:: Patient and spouse at bedside.  Freedom of Choice: Yes  Comments - Freedom of Choice: CM discussed discharge planning and freedom of choice if services are recommended by [facility].  CM contacted family/caregiver?: Yes  Were Treatment Team discharge recommendations reviewed with patient/caregiver?: Yes  Did patient/caregiver verbalize understanding of patient care needs?: Yes  Were patient/caregiver advised of the risks associated with not following Treatment Team discharge recommendations?: Yes    Contacts  Patient Contacts: Sri at bedside  Relationship to Patient:: Family  Contact Method: In Person  Reason/Outcome: Continuity of Care, Discharge Planning    Requested Home Health Care         Is the patient interested in HHC at discharge?: No    DME Referral Provided  Referral made for DME?: No    Other Referral/Resources/Interventions Provided:  Interventions: None Indicated    Would you like to participate in our [facility] service program?  : No - Declined    Treatment Team Recommendation: Home  Discharge Destination Plan:: Home  Transport at Discharge : Family

## 2024-07-29 NOTE — ED PROVIDER NOTES
History  Chief Complaint   Patient presents with    Chest Pain     Pt complains of sharp pain under his right shoulder blade. Pt was seen a couple days ago for pain under the left shoulder which has subsided. No cardiac hx      HPI    Patient is a 68-year-old male present emerged department with multiple concerns.  Patient reports chronic neck and right shoulder pain.  Patient has seen numerous providers and treated for muscle spasm.  Patient says over the course the last 2 days he has noted left-sided chest discomfort some intermittent shortness of breath.  Nothing seems to make discomfort better or worse.  Patient does report having a fever yesterday.  He has been using Tylenol for discomfort.  Denies any abdominal discomfort or nausea and vomiting.  Review of systems otherwise negative.  History includes diabetes, CKD.    Prior to Admission Medications   Prescriptions Last Dose Informant Patient Reported? Taking?   Cholecalciferol 100 MCG (4000 UT) CAPS Past Week Self No Yes   Sig: Take 1 capsule (4,000 Units total) by mouth in the morning   Diclofenac Sodium (VOLTAREN) 1 % Past Week  No Yes   Sig: Apply 2 g topically 4 (four) times a day   Diclofenac Sodium (VOLTAREN) 1 % Past Week  No Yes   Sig: Apply 2 g topically 4 (four) times a day For pain to affected area   Empagliflozin (Jardiance) 25 MG TABS Past Week Self No Yes   Sig: TAKE 1 TABLET BY MOUTH EVERY DAY IN THE MORNING   Multiple Vitamins-Minerals (Centrum Silver 50+Men) TABS Past Week Self Yes Yes   allopurinol (ZYLOPRIM) 300 mg tablet Past Week Self No Yes   Sig: Take 1 tablet (300 mg total) by mouth daily   amLODIPine (NORVASC) 10 mg tablet Unknown Self No No   Sig: Take 1 tablet (10 mg total) by mouth daily   aspirin 81 mg chewable tablet Past Week Self Yes Yes   Sig: Chew 81 mg daily   cyclobenzaprine (FLEXERIL) 10 mg tablet 7/28/2024  No Yes   Sig: Take 1 tablet (10 mg total) by mouth 3 (three) times a day as needed for muscle spasms    hydroCHLOROthiazide 25 mg tablet Past Week Self No Yes   Sig: Take 1 tablet (25 mg total) by mouth daily   lidocaine (LMX) 4 % cream Past Week  No Yes   Sig: Apply topically as needed for mild pain   lidocaine (Lidoderm) 5 %  Self No No   Sig: Apply 1 patch topically over 12 hours daily for 14 days Remove & Discard patch within 12 hours or as directed by MD   losartan (COZAAR) 100 MG tablet Past Week Self No Yes   Sig: Take 1 tablet (100 mg total) by mouth daily   metFORMIN (GLUCOPHAGE) 500 mg tablet Past Week Self No Yes   Sig: TAKE 2 TABLETS BY MOUTH TWICE A DAY WITH FOOD   methylPREDNISolone 4 MG tablet therapy pack Not Taking  No No   Sig: Use as directed on package   Patient not taking: Reported on 7/29/2024   metoprolol succinate (TOPROL-XL) 100 mg 24 hr tablet Past Week Self No Yes   Sig: Take 1 tablet (100 mg total) by mouth every evening   oxyCODONE (Roxicodone) 5 immediate release tablet Past Week Self No Yes   Sig: Take 1 tablet (5 mg total) by mouth every 6 (six) hours as needed for moderate pain for up to 7 doses Max Daily Amount: 20 mg   rosuvastatin (CRESTOR) 40 MG tablet Past Week Self No Yes   Sig: Take 1 tablet (40 mg total) by mouth every evening      Facility-Administered Medications: None       Past Medical History:   Diagnosis Date    Arthritis 2010's    Occasionally flares up    Cancer (HCC) May 2021    Still investigating    Chronic kidney disease     Diabetes mellitus (HCC)     Follicular lymphoma (HCC)     GERD (gastroesophageal reflux disease) June 2021    Noticed in an Endoscopy    Heart murmur May 2020    High cholesterol     Hypertension     Kidney stone 1980's    Have had since 1980's    Obesity Since Childhood       Past Surgical History:   Procedure Laterality Date    BUNIONECTOMY Right 11/24/2020    Procedure: RIGHT HAV CORRECTION,;  Surgeon: Niranjan Child DPM;  [facility];  Service: Podiatry    COLONOSCOPY      INCISION AND DRAINAGE OF WOUND Right 10/05/2016     Procedure: INCISION AND DRAINAGE (I&D) EXTREMITY;  Surgeon: Niranjan Child DPM;  [facility];  Service:     IR BIOPSY LYMPH NODE  07/08/2021    IR EMBOLIZATION (SPECIFY VESSEL OR SITE)  07/08/2021    IR PORT PLACEMENT  9/1/2022    PILONIDAL CYST EXCISION      LA CORRECTION HAMMERTOE Right 02/19/2019    Procedure: THIRD HAMMER TOE CORRECTION;  Surgeon: Niranjan Child DPM;  [facility];  Service: Podiatry    LA RMVL MATEO CTR VAD W/SUBQ PORT/ CTR/PRPH INSJ N/A 3/14/2023    Procedure: REMOVAL VENOUS PORT (PORT-A-CATH)IR;  Surgeon: Avelino Vázquez DO;  [facility];  Service: Interventional Radiology    TOE OSTEOTOMY Right 03/14/2017    Procedure: HAMMERTOE CORRECTION R 2 ;  Surgeon: Niranjan Child DPM;  [facility];  Service:     TOE OSTEOTOMY Left 11/24/2020    Procedure: LEFT HT CORRECTION TOE;  Surgeon: Niranjan Child DPM;  [facility];  Service: Podiatry    TONSILLECTOMY  1963       Family History   Problem Relation Age of Onset    Cancer Father         Leukemia     I have reviewed and agree with the history as documented.    E-Cigarette/Vaping    E-Cigarette Use Never User      E-Cigarette/Vaping Substances    Nicotine No     THC No     CBD No     Flavoring No     Other No     Unknown No      Social History     Tobacco Use    Smoking status: Never    Smokeless tobacco: Never    Tobacco comments:     Never smoked but exposed to second hand smoke from birth until 1980's   Vaping Use    Vaping status: Never Used   Substance Use Topics    Alcohol use: Never    Drug use: Never       Review of Systems   Constitutional:  Negative for chills and fever.   HENT:  Negative for ear pain and sore throat.    Eyes:  Negative for pain and visual disturbance.   Respiratory:  Positive for chest tightness and shortness of breath. Negative for cough.    Cardiovascular:  Negative for chest pain and palpitations.   Gastrointestinal:  Negative for abdominal pain and vomiting.   Genitourinary:   Negative for dysuria and hematuria.   Musculoskeletal:  Negative for arthralgias and back pain.   Skin:  Negative for color change and rash.   Neurological:  Negative for seizures and syncope.   All other systems reviewed and are negative.      Physical Exam  Physical Exam  Vitals and nursing note reviewed.   Constitutional:       General: He is not in acute distress.     Appearance: He is well-developed.   HENT:      Head: Normocephalic and atraumatic.   Eyes:      Conjunctiva/sclera: Conjunctivae normal.   Neck:      Comments: Paraspinal tenderness  Cardiovascular:      Rate and Rhythm: Regular rhythm. Tachycardia present.      Heart sounds: Murmur heard.   Pulmonary:      Effort: Pulmonary effort is normal. No respiratory distress.      Breath sounds: Normal breath sounds.   Abdominal:      Palpations: Abdomen is soft.      Tenderness: There is no abdominal tenderness.   Musculoskeletal:         General: No swelling.      Cervical back: Neck supple.      Comments: Right trapezius tenderness   Skin:     General: Skin is warm and dry.      Capillary Refill: Capillary refill takes less than 2 seconds.   Neurological:      General: No focal deficit present.      Mental Status: He is alert and oriented to person, place, and time.   Psychiatric:         Mood and Affect: Mood normal.         Vital Signs  ED Triage Vitals   Temperature Pulse Respirations Blood Pressure SpO2   07/28/24 2144 07/28/24 2144 07/28/24 2144 07/28/24 2144 07/28/24 2144   100.2 °F (37.9 °C) (!) 135 20 121/73 95 %      Temp Source Heart Rate Source Patient Position - Orthostatic VS BP Location FiO2 (%)   07/28/24 2144 07/28/24 2144 07/28/24 2144 07/28/24 2144 --   Oral Monitor Sitting Left arm       Pain Score       07/29/24 0312       10 - Worst Possible Pain           Vitals:    07/28/24 2345 07/29/24 0100 07/29/24 0200 07/29/24 0300   BP: 144/86 109/70 142/72 103/69   Pulse: (!) 114 103 103 (!) 112   Patient Position - Orthostatic VS:     Sitting         Visual Acuity      ED Medications  Medications   acetaminophen (TYLENOL) tablet 650 mg (has no administration in time range)   heparin (porcine) subcutaneous injection 5,000 Units (has no administration in time range)   morphine injection 4 mg (has no administration in time range)   insulin lispro (HumALOG/ADMELOG) 100 units/mL subcutaneous injection 2-12 Units ( Subcutaneous Not Given 7/29/24 0321)   ceftriaxone (ROCEPHIN) 2 g/50 mL in dextrose IVPB (has no administration in time range)   vancomycin (VANCOCIN) 2,000 mg in sodium chloride 0.9 % 500 mL IVPB (2,000 mg Intravenous New Bag 7/29/24 0316)   aspirin chewable tablet 81 mg (has no administration in time range)   nitroglycerin (NITROSTAT) SL tablet 0.4 mg (has no administration in time range)   vancomycin (VANCOCIN) IVPB (premix in dextrose) 1,000 mg 200 mL (has no administration in time range)   aspirin tablet 325 mg (325 mg Oral Given 7/28/24 2211)   sodium chloride 0.9 % bolus 1,000 mL (0 mL Intravenous Stopped 7/29/24 0154)   iohexol (OMNIPAQUE) 350 MG/ML injection (MULTI-DOSE) 85 mL (85 mL Intravenous Given 7/28/24 2254)   ceftriaxone (ROCEPHIN) 1 g/50 mL in dextrose IVPB (0 mg Intravenous Stopped 7/29/24 0020)   acetaminophen (Ofirmev) injection 1,000 mg (0 mg Intravenous Stopped 7/29/24 0303)   morphine injection 4 mg (4 mg Intravenous Given 7/29/24 0312)       Diagnostic Studies  Results Reviewed       Procedure Component Value Units Date/Time    Lipid Panel with Direct LDL reflex [[id number]]  (Abnormal) Collected: 07/29/24 0415    Lab Status: Final result Specimen: Blood Updated: 07/29/24 0439     Cholesterol 90 mg/dL      Triglycerides 147 mg/dL      HDL, Direct 26 mg/dL      LDL Calculated 35 mg/dL     Basic metabolic panel [[id number]]  (Abnormal) Collected: 07/29/24 0415    Lab Status: Final result Specimen: Blood Updated: 07/29/24 0439     Sodium 134 mmol/L      Potassium 4.5 mmol/L      Chloride 96 mmol/L      CO2 24 mmol/L       ANION GAP 14 mmol/L      BUN 28 mg/dL      Creatinine 1.52 mg/dL      Glucose 196 mg/dL      Calcium 9.9 mg/dL      eGFR 46 ml/min/1.73sq m     Narrative:      National Kidney Disease Foundation guidelines for Chronic Kidney Disease (CKD):     Stage 1 with normal or high GFR (GFR > 90 mL/min/1.73 square meters)    Stage 2 Mild CKD (GFR = 60-89 mL/min/1.73 square meters)    Stage 3A Moderate CKD (GFR = 45-59 mL/min/1.73 square meters)    Stage 3B Moderate CKD (GFR = 30-44 mL/min/1.73 square meters)    Stage 4 Severe CKD (GFR = 15-29 mL/min/1.73 square meters)    Stage 5 End Stage CKD (GFR <15 mL/min/1.73 square meters)  Note: GFR calculation is accurate only with a steady state creatinine    Lactic acid, plasma (w/reflex if result > 2.0) [[id number]]  (Normal) Collected: 07/29/24 0416    Lab Status: Final result Specimen: Blood Updated: 07/29/24 0439     LACTIC ACID 1.0 mmol/L     Narrative:      Result may be elevated if tourniquet was used during collection.    CBC and differential [[id number]]  (Abnormal) Collected: 07/29/24 0412    Lab Status: Final result Specimen: Blood Updated: 07/29/24 0427     WBC 27.41 Thousand/uL      RBC 4.98 Million/uL      Hemoglobin 12.8 g/dL      Hematocrit 40.2 %      MCV 81 fL      MCH 25.7 pg      MCHC 31.8 g/dL      RDW 16.8 %      MPV 9.8 fL      Platelets 214 Thousands/uL     Narrative:      See Manual Diff Done Within 3 Days  This is an appended report.  These results have been appended to a previously verified report.    Procalcitonin [[id number]] Collected: 07/29/24 0416    Lab Status: In process Specimen: Blood Updated: 07/29/24 0416    HS Troponin I 4hr [[id number]]  (Abnormal) Collected: 07/29/24 0673    Lab Status: Final result Specimen: Blood from Arm, Right Updated: 07/29/24 0340     hs TnI 4hr 57 ng/L      Delta 4hr hsTnI 26 ng/L     HS Troponin I 2hr [[id number]]  (Normal) Collected: 07/28/24 6236    Lab Status: Final result Specimen: Blood from Arm, Right Updated:  07/29/24 0017     hs TnI 2hr 41 ng/L      Delta 2hr hsTnI 10 ng/L     Blood culture #2 [[id number]] Collected: 07/28/24 2339    Lab Status: In process Specimen: Blood from Arm, Left Updated: 07/28/24 2349    Blood culture #1 [[id number]] Collected: 07/28/24 2339    Lab Status: In process Specimen: Blood from Arm, Right Updated: 07/28/24 2349    B-Type Natriuretic Peptide(BNP) [[id number]]  (Abnormal) Collected: 07/28/24 2218    Lab Status: Final result Specimen: Blood from Arm, Right Updated: 07/28/24 2344      pg/mL     Manual Differential(PHLEBS Do Not Order) [[id number]]  (Abnormal) Collected: 07/28/24 2218    Lab Status: Final result Specimen: Blood from Arm, Right Updated: 07/28/24 2320     Segmented % 90 %      Bands % 3 %      Lymphocytes % 2 %      Monocytes % 5 %      Eosinophils % 0 %      Basophils % 0 %      Absolute Neutrophils 26.63 Thousand/uL      Absolute Lymphocytes 0.57 Thousand/uL      Absolute Monocytes 1.43 Thousand/uL      Absolute Eosinophils 0.00 Thousand/uL      Absolute Basophils 0.00 Thousand/uL      Total Counted --     RBC Morphology Normal     Platelet Estimate Adequate    Procalcitonin [[id number]]  (Abnormal) Collected: 07/28/24 2218    Lab Status: Final result Specimen: Blood from Arm, Right Updated: 07/28/24 2310     Procalcitonin 0.59 ng/ml     Lactic acid, plasma (w/reflex if result > 2.0) [[id number]]  (Normal) Collected: 07/28/24 2245    Lab Status: Final result Specimen: Blood from Arm, Right Updated: 07/28/24 2308     LACTIC ACID 1.3 mmol/L     Narrative:      Result may be elevated if tourniquet was used during collection.    D-dimer, quantitative [[id number]]  (Abnormal) Collected: 07/28/24 2218    Lab Status: Final result Specimen: Blood from Arm, Right Updated: 07/28/24 2306     D-Dimer, Quant 1.79 ug/ml FEU     Narrative:      In the evaluation for possible pulmonary embolism, in the appropriate (Well's Score of 4 or less) patient, the age adjusted d-dimer cutoff for  this patient can be calculated as:    Age x 0.01 (in ug/mL) for Age-adjusted D-dimer exclusion threshold for a patient over 50 years.    CBC and differential [[id number]]  (Abnormal) Collected: 07/28/24 2218    Lab Status: Final result Specimen: Blood from Arm, Right Updated: 07/28/24 2306     WBC 28.63 Thousand/uL      RBC 5.38 Million/uL      Hemoglobin 13.6 g/dL      Hematocrit 43.9 %      MCV 82 fL      MCH 25.3 pg      MCHC 31.0 g/dL      RDW 16.8 %      MPV 10.5 fL      Platelets 250 Thousands/uL     HS Troponin 0hr (reflex protocol) [[id number]]  (Normal) Collected: 07/28/24 2218    Lab Status: Final result Specimen: Blood from Arm, Right Updated: 07/28/24 2246     hs TnI 0hr 31 ng/L     COVID/FLU/RSV [[id number]]  (Normal) Collected: 07/28/24 2203    Lab Status: Final result Specimen: Nares from Nose Updated: 07/28/24 2246     SARS-CoV-2 Negative     INFLUENZA A PCR Negative     INFLUENZA B PCR Negative     RSV PCR Negative    Narrative:      FOR PEDIATRIC PATIENTS - copy/paste COVID Guidelines URL to browser: [email or web address]    SARS-CoV-2 assay is a Nucleic Acid Amplification assay intended for the  qualitative detection of nucleic acid from SARS-CoV-2 in nasopharyngeal  swabs. Results are for the presumptive identification of SARS-CoV-2 RNA.    Positive results are indicative of infection with SARS-CoV-2, the virus  causing COVID-19, but do not rule out bacterial infection or co-infection  with other viruses. Laboratories within the [place] and its  territories are required to report all positive results to the appropriate  public health authorities. Negative results do not preclude SARS-CoV-2  infection and should not be used as the sole basis for treatment or other  patient management decisions. Negative results must be combined with  clinical observations, patient history, and epidemiological information.  This test has not been FDA cleared  or approved.    This test has been authorized by FDA under an Emergency Use Authorization  (EUA). This test is only authorized for the duration of time the  declaration that circumstances exist justifying the authorization of the  emergency use of an in vitro diagnostic tests for detection of SARS-CoV-2  virus and/or diagnosis of COVID-19 infection under section 564(b)(1) of  the Act, 21 U.S.C. 360bbb-3(b)(1), unless the authorization is terminated  or revoked sooner. The test has been validated but independent review by FDA  and CLIA is pending.    Test performed using [employer] GeneXpert: This RT-PCR assay targets N2,  a region unique to SARS-CoV-2. A conserved region in the E-gene was chosen  for pan-Sarbecovirus detection which includes SARS-CoV-2.    According to CMS-2020-01-R, this platform meets the definition of high-throughput technology.    Comprehensive metabolic panel [[id number]]  (Abnormal) Collected: 07/28/24 2218    Lab Status: Final result Specimen: Blood from Arm, Right Updated: 07/28/24 2237     Sodium 133 mmol/L      Potassium 4.4 mmol/L      Chloride 96 mmol/L      CO2 21 mmol/L      ANION GAP 16 mmol/L      BUN 27 mg/dL      Creatinine 1.18 mg/dL      Glucose 224 mg/dL      Calcium 9.9 mg/dL      AST 29 U/L      ALT 43 U/L      Alkaline Phosphatase 92 U/L      Total Protein 8.4 g/dL      Albumin 3.9 g/dL      Total Bilirubin 0.66 mg/dL      eGFR 63 ml/min/1.73sq m     Narrative:      National Kidney Disease Foundation guidelines for Chronic Kidney Disease (CKD):     Stage 1 with normal or high GFR (GFR > 90 mL/min/1.73 square meters)    Stage 2 Mild CKD (GFR = 60-89 mL/min/1.73 square meters)    Stage 3A Moderate CKD (GFR = 45-59 mL/min/1.73 square meters)    Stage 3B Moderate CKD (GFR = 30-44 mL/min/1.73 square meters)    Stage 4 Severe CKD (GFR = 15-29 mL/min/1.73 square meters)    Stage 5 End Stage CKD (GFR <15 mL/min/1.73 square meters)  Note: GFR calculation is accurate only with a steady  state creatinine                   CTA ED chest PE Study   Final Result by Gabriel Woods MD (07/28 0029)      1.  No acute pulmonary embolism to the segmental level with evaluation of the more peripheral subsegmental branches limited by timing of contrast bolus and inadequate contrast opacification.   2.  Ectatic ascending thoracic aorta again noted measuring up to 4.6 cm in diameter, not significantly changed. No aortic dissection.   3.  Diffuse wall thickening of the left ventricle including interventricular septum noted suggesting left ventricular hypertrophy.   4.  No lobar airspace consolidation/pneumonia. No pleural or pericardial effusions.      Workstation performed: [id number]         XR chest 2 views   Final Result by Angela Montes De Oca MD (07/28 2300)      No acute cardiopulmonary disease.            Workstation performed: [id number]         XR foot 3+ views RIGHT    (Results Pending)              Procedures  Procedures         ED Course                 Identification of Seniors at Risk      Flowsheet Row Most Recent Value   (ISAR) Identification of Seniors at Risk    Before the illness or injury that brought you to the Emergency, did you need someone to help you on a regular basis? 0 Filed at: 07/28/2024 2146   In the last 24 hours, have you needed more help than usual? 0 Filed at: 07/28/2024 2146   Have you been hospitalized for one or more nights during the past 6 months? 0 Filed at: 07/28/2024 2146   In general, do you see well? 0 Filed at: 07/28/2024 2146   In general, do you have serious problems with your memory? 0 Filed at: 07/28/2024 2146   Do you take more than three different medications every day? 0 Filed at: 07/28/2024 2146   ISAR Score 0 Filed at: 07/28/2024 2146                        SBIRT 20yo+      Flowsheet Row Most Recent Value   Initial Alcohol Screen: US AUDIT-C     1. How often do you have a drink containing alcohol? 0 Filed at: 07/28/2024 2146   2. How many drinks containing  alcohol do you have on a typical day you are drinking?  0 Filed at: 07/28/2024 2146   3a. Male UNDER 65: How often do you have five or more drinks on one occasion? 0 Filed at: 07/28/2024 2146   Audit-C Score 0 Filed at: 07/28/2024 2146   MARVIN: How many times in the past year have you...    Used an illegal drug or used a prescription medication for non-medical reasons? Never Filed at: 07/28/2024 2146                      Medical Decision Making  EKG: rate 135 Sinus tachycardia without signs of acute ischemic change. Compared to prior 7/27    Amount and/or Complexity of Data Reviewed  Labs: ordered.  Radiology: ordered.    Risk  OTC drugs.  Prescription drug management.  Decision regarding hospitalization.                 Disposition  Final diagnoses:   Chest pain   Shortness of breath   Hypoxia     Time reflects when diagnosis was documented in both MDM as applicable and the Disposition within this note       Time User Action Codes Description Comment    7/29/2024 12:10 AM Delma Kiran [R07.9] Chest pain     7/29/2024 12:10 AM Delma Kiran [R06.02] Shortness of breath     7/29/2024 12:10 AM Delma Kiran [R09.02] Hypoxia     7/29/2024  2:36 AM Irineo Pedersen Add [R21] Rash           ED Disposition       ED Disposition   Admit    Condition   Stable    Date/Time   Mon Jul 29, 2024 0010    Comment   Case was discussed with hospitalist and the patient's admission status was agreed to be Admission Status: observation status to the service of Dr. Pedersen .               Follow-up Information    None         Current Discharge Medication List        CONTINUE these medications which have NOT CHANGED    Details   allopurinol (ZYLOPRIM) 300 mg tablet Take 1 tablet (300 mg total) by mouth daily  Qty: 90 tablet, Refills: 1    Associated Diagnoses: Hypertensive kidney disease with stage 2 chronic kidney disease; Diabetes 1.5, managed as type 2 (HCC); Type 2 diabetes mellitus with stage 3 chronic kidney disease,  unspecified whether long term insulin use, unspecified whether stage 3a or 3b CKD (HCC)      aspirin 81 mg chewable tablet Chew 81 mg daily      Cholecalciferol 100 MCG (4000 UT) CAPS Take 1 capsule (4,000 Units total) by mouth in the morning    Associated Diagnoses: Vitamin D deficiency      cyclobenzaprine (FLEXERIL) 10 mg tablet Take 1 tablet (10 mg total) by mouth 3 (three) times a day as needed for muscle spasms  Qty: 30 tablet, Refills: 0    Associated Diagnoses: Spasm of right trapezius muscle      !! Diclofenac Sodium (VOLTAREN) 1 % Apply 2 g topically 4 (four) times a day  Qty: 100 g, Refills: 1    Associated Diagnoses: Spasm of right trapezius muscle      !! Diclofenac Sodium (VOLTAREN) 1 % Apply 2 g topically 4 (four) times a day For pain to affected area  Qty: 100 g, Refills: 0    Associated Diagnoses: Acute pain of right shoulder      Empagliflozin (Jardiance) 25 MG TABS TAKE 1 TABLET BY MOUTH EVERY DAY IN THE MORNING  Qty: 90 tablet, Refills: 1    Associated Diagnoses: Stage 3a chronic kidney disease (HCC); Other proteinuria; Type 2 diabetes mellitus with stage 3 chronic kidney disease, unspecified whether long term insulin use, unspecified whether stage 3a or 3b CKD (HCC)      hydroCHLOROthiazide 25 mg tablet Take 1 tablet (25 mg total) by mouth daily  Qty: 90 tablet, Refills: 1    Associated Diagnoses: Hypertensive kidney disease with stage 2 chronic kidney disease; Diabetes 1.5, managed as type 2 ([facility]); Type 2 diabetes mellitus with stage 3 chronic kidney disease, unspecified whether long term insulin use, unspecified whether stage 3a or 3b CKD (HCC)      lidocaine (LMX) 4 % cream Apply topically as needed for mild pain  Qty: 120 g, Refills: 0    Associated Diagnoses: Acute pain of right shoulder      losartan (COZAAR) 100 MG tablet Take 1 tablet (100 mg total) by mouth daily  Qty: 90 tablet, Refills: 1    Associated Diagnoses: Hypertensive kidney disease with stage 2 chronic kidney disease;  Diabetes 1.5, managed as type 2 ([facility]); Type 2 diabetes mellitus with stage 3 chronic kidney disease, unspecified whether long term insulin use, unspecified whether stage 3a or 3b CKD ([facility])      metFORMIN (GLUCOPHAGE) 500 mg tablet TAKE 2 TABLETS BY MOUTH TWICE A DAY WITH FOOD  Qty: 360 tablet, Refills: 1    Associated Diagnoses: Hypertensive kidney disease with stage 2 chronic kidney disease; Diabetes 1.5, managed as type 2 ([facility]); Type 2 diabetes mellitus with stage 3 chronic kidney disease, unspecified whether long term insulin use, unspecified whether stage 3a or 3b CKD ([facility])      metoprolol succinate (TOPROL-XL) 100 mg 24 hr tablet Take 1 tablet (100 mg total) by mouth every evening  Qty: 90 tablet, Refills: 1    Associated Diagnoses: Hypertensive kidney disease with stage 2 chronic kidney disease; Diabetes 1.5, managed as type 2 ([facility]); Type 2 diabetes mellitus with stage 3 chronic kidney disease, unspecified whether long term insulin use, unspecified whether stage 3a or 3b CKD ([facility])      Multiple Vitamins-Minerals (Centrum Silver 50+Men) TABS       oxyCODONE (Roxicodone) 5 immediate release tablet Take 1 tablet (5 mg total) by mouth every 6 (six) hours as needed for moderate pain for up to 7 doses Max Daily Amount: 20 mg  Qty: 7 tablet, Refills: 0    Associated Diagnoses: Right shoulder pain      rosuvastatin (CRESTOR) 40 MG tablet Take 1 tablet (40 mg total) by mouth every evening  Qty: 90 tablet, Refills: 1    Associated Diagnoses: Diabetes 1.5, managed as type 2 ([facility]); Type 2 diabetes mellitus with stage 3 chronic kidney disease, unspecified whether long term insulin use, unspecified whether stage 3a or 3b CKD ([facility])      amLODIPine (NORVASC) 10 mg tablet Take 1 tablet (10 mg total) by mouth daily  Qty: 90 tablet, Refills: 1    Associated Diagnoses: Hypertensive kidney disease with stage 2 chronic kidney disease; Diabetes 1.5, managed as type 2 ([facility]); Type 2 diabetes mellitus with stage 3 chronic kidney  disease, unspecified whether long term insulin use, unspecified whether stage 3a or 3b CKD ([facility])      lidocaine (Lidoderm) 5 % Apply 1 patch topically over 12 hours daily for 14 days Remove & Discard patch within 12 hours or as directed by MD  Qty: 14 patch, Refills: 0    Associated Diagnoses: Right shoulder pain      methylPREDNISolone 4 MG tablet therapy pack Use as directed on package  Qty: 21 each, Refills: 0    Associated Diagnoses: Spasm of right trapezius muscle       !! - Potential duplicate medications found. Please discuss with provider.          No discharge procedures on file.    PDMP Review         Value Time User    PDMP Reviewed  Yes 7/11/2024  5:15 AM Carlito Oliver MD            ED Provider  Electronically Signed by                 PDMP Review         Value Time User    PDMP Reviewed  Yes 7/11/2024  5:15 AM Carlito Oliver MD            ED Provider  Electronically Signed by             Delma Kiran DO  08/12/24 3027

## 2024-07-29 NOTE — PLAN OF CARE
Problem: Prexisting or High Potential for Compromised Skin Integrity  Goal: Skin integrity is maintained or improved  Description: INTERVENTIONS:  - Identify patients at risk for skin breakdown  - Assess and monitor skin integrity  - Assess and monitor nutrition and hydration status  - Monitor labs   - Assess for incontinence   - Turn and reposition patient  - Assist with mobility/ambulation  - Relieve pressure over bony prominences  - Avoid friction and shearing  - Provide appropriate hygiene as needed including keeping skin clean and dry  - Evaluate need for skin moisturizer/barrier cream  - Collaborate with interdisciplinary team   - Patient/family teaching  - Consider wound care consult   Outcome: Progressing     Problem: PAIN - ADULT  Goal: Verbalizes/displays adequate comfort level or baseline comfort level  Description: Interventions:  - Encourage patient to monitor pain and request assistance  - Assess pain using appropriate pain scale  - Administer analgesics based on type and severity of pain and evaluate response  - Implement non-pharmacological measures as appropriate and evaluate response  - Consider cultural and social influences on pain and pain management  - Notify physician/advanced practitioner if interventions unsuccessful or patient reports new pain  Outcome: Progressing     Problem: INFECTION - ADULT  Goal: Absence or prevention of progression during hospitalization  Description: INTERVENTIONS:  - Assess and monitor for signs and symptoms of infection  - Monitor lab/diagnostic results  - Monitor all insertion sites, i.e. indwelling lines, tubes, and drains  - Monitor endotracheal if appropriate and nasal secretions for changes in amount and color  - [place] appropriate cooling/warming therapies per order  - Administer medications as ordered  - Instruct and encourage patient and family to use good hand hygiene technique  - Identify and instruct in appropriate isolation precautions for  identified infection/condition  Outcome: Progressing     Problem: SAFETY ADULT  Goal: Patient will remain free of falls  Description: INTERVENTIONS:  - Educate patient/family on patient safety including physical limitations  - Instruct patient to call for assistance with activity   - Consult OT/PT to assist with strengthening/mobility   - Keep Call bell within reach  - Keep bed low and locked with side rails adjusted as appropriate  - Keep care items and personal belongings within reach  - Initiate and maintain comfort rounds  - Make Fall Risk Sign visible to staff  - Offer Toileting every 2 Hours, in advance of need  - Initiate/Maintain bedalarm  - Obtain necessary fall risk management equipment:   - Apply yellow socks and bracelet for high fall risk patients  - Consider moving patient to room near nurses station  Outcome: Progressing  Goal: Maintain or return to baseline ADL function  Description: INTERVENTIONS:  -  Assess patient's ability to carry out ADLs; assess patient's baseline for ADL function and identify physical deficits which impact ability to perform ADLs (bathing, care of mouth/teeth, toileting, grooming, dressing, etc.)  - Assess/evaluate cause of self-care deficits   - Assess range of motion  - Assess patient's mobility; develop plan if impaired  - Assess patient's need for assistive devices and provide as appropriate  - Encourage maximum independence but intervene and supervise when necessary  - Involve family in performance of ADLs  - Assess for home care needs following discharge   - Consider OT consult to assist with ADL evaluation and planning for discharge  - Provide patient education as appropriate  Outcome: Progressing     Problem: DISCHARGE PLANNING  Goal: Discharge to home or other facility with appropriate resources  Description: INTERVENTIONS:  - Identify barriers to discharge w/patient and caregiver  - Arrange for needed discharge resources and transportation as appropriate  - Identify  discharge learning needs (meds, wound care, etc.)  - Arrange for interpretive services to assist at discharge as needed  - Refer to Case Management Department for coordinating discharge planning if the patient needs post-hospital services based on physician/advanced practitioner order or complex needs related to functional status, cognitive ability, or social support system  Outcome: Progressing     Problem: Knowledge Deficit  Goal: Patient/family/caregiver demonstrates understanding of disease process, treatment plan, medications, and discharge instructions  Description: Complete learning assessment and assess knowledge base.  Interventions:  - Provide teaching at level of understanding  - Provide teaching via preferred learning methods  Outcome: Progressing     Problem: MUSCULOSKELETAL - ADULT  Goal: Maintain or return mobility to safest level of function  Description: INTERVENTIONS:  - Assess patient's ability to carry out ADLs; assess patient's baseline for ADL function and identify physical deficits which impact ability to perform ADLs (bathing, care of mouth/teeth, toileting, grooming, dressing, etc.)  - Assess/evaluate cause of self-care deficits   - Assess range of motion  - Assess patient's mobility  - Assess patient's need for assistive devices and provide as appropriate  - Encourage maximum independence but intervene and supervise when necessary  - Involve family in performance of ADLs  - Assess for home care needs following discharge   - Consider OT consult to assist with ADL evaluation and planning for discharge  - Provide patient education as appropriate  Outcome: Progressing  Goal: Maintain proper alignment of affected body part  Description: INTERVENTIONS:  - Support, maintain and protect limb and body alignment  - Provide patient/ family with appropriate education  Outcome: Progressing

## 2024-07-29 NOTE — ASSESSMENT & PLAN NOTE
Wt Readings from Last 3 Encounters:   07/29/24 118 kg (260 lb 9.3 oz)   07/25/24 121 kg (267 lb 10.2 oz)   07/17/24 121 kg (266 lb 6.4 oz)       Not acute exacerbation as patient reports no weight gain, paroxysmal nocturnal dyspnea, orthopnea, or platypnea on admitting date 07/28/2024.  Hence, patient continues to receive medical management of chronic diastolic CHF by adhering to a 2 g sodium diet, daily weights, strict I's and O's, home-scheduled losartan 100 mg p.o. daily, home-scheduled metoprolol 100 mg p.o. every evening, and home-scheduled empagliflozin 25 mg p.o. daily, while holding off patient's home-scheduled HCTZ 25 mg p.o. daily given the potential for this medication to exacerbate patient's acute hypovolemic hyponatremia with admitting Na 133 mmol/L (07/28/2024, 10:18pm) and repeat Na 134 mmol/L (07/29/2024, 4:15am).  Of note, no fluid restriction is warranted on admitting date 07/28/2024.    Cf., 07/05/2024 TTE: LV EF 62%, grade I LV  diastolic dysfunction.  RV systolic function normal.

## 2024-07-29 NOTE — ASSESSMENT & PLAN NOTE
Patient has no complaints of angina, syncope, or acute-on-chronic diastolic CHF on admitting date 07/28/2024.    Instead, patient emphatically reports of a 3 weeks long history of acute right-sided posterior neck and right-sided anterior shoulder pain that has been constant 24 hours a day, 7 days a week, for the past 3 weeks, and is non-pleuritic, non-traumatic, non-radiating, non-exertional, and non-positional, and which prevents the patient from eating and sleeping.    Patient subsequently reports that the 3 weeks long history of acute right-sided posterior neck and right-sided anterior shoulder pain has resolved spontaneously, and has been replaced on admitting date 07/28/2024 with acute left-sided posterior neck and acute left-sided anterior shoulder pain.  Moreover, deep palpation of patient's posterior neck bilaterally and anterior shoulders bilaterally reveal no tenderness at all.  Patient also reports that whenever he moves his left shoulder, that the left-sided anterior shoulder pain worsens from an 8 out of 10 intensity to a 10 out of 10 intensity.  Patient also emphatically denies any chest pain per se, and insists that the pain is in his left-sided posterior neck and his left-sided anterior shoulder.    Cf., 07/05/2024 TTE:  moderate aortic stenosis with mean/peak gradients of 30 mm Hg and 46 mm Hg, respectively, with IAIN 1 cm2 (by VTI).      In the absence of angina, syncope, or acute-manage chronic diastolic CHF exacerbation, the likelihood that patient's moderate aortic stenosis has worsened since 07/05/2024 TTE remains low.  Nonetheless, patient reports that he would like to know if his moderate aortic stenosis has worsened since 07/05/2024.  Hence, I have ordered repeat TTE for the 07/29/2024, 8 AM, to evaluate for any interval changes since 07/05/2024 TTE.

## 2024-07-29 NOTE — PROGRESS NOTES
[facility]  Progress Note  Name: Augustus Coffman I  MRN: [id number]  Unit/Bed#: -01 I Date of Admission: 7/28/2024   Date of Service: 7/29/2024 I Hospital Day: 0    Assessment & Plan   Hyponatremia  Assessment & Plan  Lab Results   Component Value Date    SODIUM 134 (L) 07/29/2024    SODIUM 133 (L) 07/28/2024    SODIUM 136 07/11/2024        FINA (acute kidney injury) (HCC)  Assessment & Plan  Lab Results   Component Value Date    CREATININE 1.52 (H) 07/29/2024    CREATININE 1.18 07/28/2024    CREATININE 1.87 (H) 07/11/2024    CREATININE 1.15 04/18/2024

## 2024-07-29 NOTE — ASSESSMENT & PLAN NOTE
Lab Results   Component Value Date    CREATININE 1.52 (H) 07/29/2024    CREATININE 1.18 07/28/2024    CREATININE 1.87 (H) 07/11/2024    CREATININE 1.15 04/18/2024

## 2024-07-29 NOTE — ASSESSMENT & PLAN NOTE
Patient has a past medical history of follicular B-cell lymphoma (diagnosed in 2020 by [facility] Dr. Naomi Cummings, [facility]), s/p chemotherapy x 6 months, ended in Feburary 2022, now in clinical remission.  Observe.

## 2024-07-29 NOTE — H&P
[facility]  H&P  Name: Augustus Coffman 68 y.o. male I MRN: [id number]  Unit/Bed#: -01 I Date of Admission: 7/28/2024   Date of Service: 7/29/2024 I Hospital Day: 0      Assessment & Plan   * Sepsis without acute organ dysfunction (HCC)  Assessment & Plan  Patient is afebrile at 100.2 °F (07/28/2024, 9:44 PM), but I note that patient also carries a diagnosis of follicular B-cell lymphoma (diagnosed in 2020 by [facility] Heme-On[facility] Dr. Naomi Cummings, [facility]), s/p chemotherapy x 6 months, ended in Feburary 2022, now in clinical remission; hence, patient is immunosuppressed and may be incapable of mounting a paula mayito fever of 100.4 °F or greater.    In addition, patient has an admitting heart rate of 135 bpm and a respiratory rate of 20 breaths/min, saturating 95% on room air (07/28/2024, 9:44 PM) with repeat O2 saturation 88% on room air (07/28/2024, 10:30 PM), and repeat O2 saturation 95% on 4 liters/minute O2 via NC (07/28/2024, 10:31pm).   Exam was noted for a chest that was clear to auscultation and nontender to palpation.  In addition, patient's right sole was noted for an open 1 cm ulcer at the ball of patient's right sole and which was negative for erythema (cf., the patient's entire right foot and right lower shin were actually dark brown/cyanotic), edema, induration, warmth (cf., the patient's entire right foot and right lower shin were actually cold to touch), tenderness, crepitus, fluctuance, serous/sanguinous/suppurative discharge, malodor, and lymphangitic streaking.    Labs were noted for an elevated white blood cell count of 28.63, neutrophils 90%, bands 3%, lymphocytes 2%, and monocytes 5% (07/28/2024, 10:18 PM).  Patient also has an elevated procalcitonin #1 0.59 ng/mL and a normal lactic acid #1 1.3 mmol/L (07/28/2024, 10:18pm)    Additional testing included CTA chest (07/28/2024, 11:38pm):      1.  No acute pulmonary embolism to the segmental  level with evaluation of the more peripheral subsegmental branches limited by timing of contrast bolus and inadequate contrast opacification.  2.  Ectatic ascending thoracic aorta again noted measuring up to 4.6 cm in diameter, not significantly changed. No aortic dissection.  No pericardial effusion.  3.  Diffuse wall thickening of the left ventricle including interventricular septum noted suggesting left ventricular hypertrophy.  4.  No lobar airspace consolidation/pneumonia.   5.  No pleural or pericardial effusions.      Patient was subsequently admitted to the inpatient hospitalist service at [facility]) on 07/28/2024 with the following diagnosis:    1.  Sepsis of unclear etiology, R/O bacteremia.    To address #1, patient was started empirically on ceftriaxone 1 g IV x 1 dose (07/28/2024, 11:30 PM) in [facility] ([facility]).  Patient will continue with ceftriaxone 2 g IV daily (day 1 of 3 on 07/29/2024, 11 PM) and vancomycin 1 g IV q12 (day #1/3 on 07/29/2024, 3:00 PM) in [facility] Telemetry bed #431-1, as patient may be suffering from an incipient CAP that is not yet visualized on CTA chest 07/28/2024, 11:38pm), which I think is unlikely, or patient may be suffering from acute bacteremia with the portal of entry being the right sole/ball.    I will check repeat vitals, chest exam, right sole/ball exam, WBC with differential, lactic acid, procalcitonin, surveillance blood cultures #1 and #2 (07/28/2024, 11:39pm), and surveillance right ball of foot wound  culture in the 07/29/2024 am.    Encounter for deep vein thrombosis (DVT) prophylaxis  Assessment & Plan  Start pharmacologic DVT prophylaxis utilizing heparin 5000 units SQ every 8 hours (07/29/2024, 6 AM).  Of note, patient has no complaints of calf pain, leg swelling, or pleurisy, to suggest either DVT or PE on admission date 07/28/2024.    Chronic diastolic CHF (congestive heart failure) (HCC)  Assessment  & Plan  Wt Readings from Last 3 Encounters:   07/29/24 118 kg (260 lb 9.3 oz)   07/25/24 121 kg (267 lb 10.2 oz)   07/17/24 121 kg (266 lb 6.4 oz)       Not acute exacerbation as patient reports no weight gain, paroxysmal nocturnal dyspnea, orthopnea, or platypnea on admitting date 07/28/2024.  Hence, patient continues to receive medical management of chronic diastolic CHF by adhering to a 2 g sodium diet, daily weights, strict I's and O's, home-scheduled losartan 100 mg p.o. daily, home-scheduled metoprolol 100 mg p.o. every evening, and home-scheduled empagliflozin 25 mg p.o. daily, while holding off patient's home-scheduled HCTZ 25 mg p.o. daily given the potential for this medication to exacerbate patient's acute hypovolemic hyponatremia with admitting Na 133 mmol/L (07/28/2024, 10:18pm) and repeat Na 134 mmol/L (07/29/2024, 4:15am).  Of note, no fluid restriction is warranted on admitting date 07/28/2024.    Cf., 07/05/2024 TTE: LV EF 62%, grade I LV  diastolic dysfunction.  RV systolic function normal.        Acute hyponatremia  Assessment & Plan  Acute hypovolemic hyponatremia with admitting Na 133 mmol/L (07/28/2024, 10:18pm).  Cf., repeat Na 134 mmol/L (07/29/2024, 4:15am).      Cf., baseline Na range, 136-142 mmol/L (10/08/2016 - 07/11/2024).    Etiology of acute hypovolemic hyponatremia is most probably due to patient's home-scheduled HCTZ 25 mg p.o. daily and the associated HCTZ-mediated natri-uresis.  Hence, I have opted to hold off this medication on admitting date 07/28/2024 and on subsequent date 07/29/2024.  I will check repeat sodium level in 07/30/2024, 6:00am.    Acute hypoxic respiratory failure (HCC)  Assessment & Plan  Patient is afebrile at 100.2 °F (07/28/2024, 9:44 PM), but I note that patient also carries a diagnosis of follicular B-cell lymphoma (diagnosed in 2020 by [facility]On[facility] Dr. Naomi Cummings, [facility]), s/p chemotherapy x 6 months, ended in Feburary  2022, now in clinical remission; hence, patient is immunosuppressed and may be incapable of mounting a paula mayito fever of 100.4 °F or greater.    In addition, patient has an admitting heart rate of 135 bpm and a respiratory rate of 20 breaths/min, saturating 95% on room air (07/28/2024, 9:44 PM) with repeat O2 saturation 88% on room air (07/28/2024, 10:30 PM), and repeat O2 saturation 95% on 4 liters/minute O2 via NC (07/28/2024, 10:31pm).   Exam was noted for a chest that was clear to auscultation and nontender to palpation.  In addition, patient's right sole was noted for an open 1 cm ulcer at the ball of patient's right sole and which was negative for erythema (cf., the patient's entire right foot and right lower shin were actually dark brown/cyanotic), edema, induration, warmth (cf., the patient's entire right foot and right lower shin were actually cold to touch), tenderness, crepitus, fluctuance, serous/sanguinous/suppurative discharge, malodor, and lymphangitic streaking.    Labs were noted for an elevated white blood cell count of 28.63, neutrophils 90%, bands 3%, lymphocytes 2%, and monocytes 5% (07/28/2024, 10:18 PM).  Patient also has an elevated procalcitonin #1 0.59 ng/mL and a normal lactic acid #1 1.3 mmol/L (07/28/2024, 10:18pm)    Additional testing included CTA chest (07/28/2024, 11:38pm):      1.  No acute pulmonary embolism to the segmental level with evaluation of the more peripheral subsegmental branches limited by timing of contrast bolus and inadequate contrast opacification.  2.  Ectatic ascending thoracic aorta again noted measuring up to 4.6 cm in diameter, not significantly changed. No aortic dissection.  No pericardial effusion.  3.  Diffuse wall thickening of the left ventricle including interventricular septum noted suggesting left ventricular hypertrophy.  4.  No lobar airspace consolidation/pneumonia.   5.  No pleural or pericardial effusions.      Patient was subsequently admitted  to the inpatient hospitalist service at [facility] ([facility]) on 07/28/2024 with the following diagnosis:    1.  Acute hypoxic respiratory failure, R/O bacteremia.    To address #1, patient was started empirically on 4 liters/minute O2 via nasal cannula in this patient who is normally oxygen-naive at home and has no history of tobacco abuse (primary/secondary), and no history of asthma, COPD, or intubation/mechanical ventilation.     Patient was also started on ceftriaxone 1 g IV x 1 dose (07/28/2024, 11:30 PM) in [facility] ER ([facility]).  Patient will continue with ceftriaxone 2 g IV daily (day 1 of 3 on 07/29/2024, 11 PM) and vancomycin 1 g IV q12 (day #1/3 on 07/29/2024, 3:00 PM) in [facility] Telemetry bed #431-1, as patient may be suffering from an incipient CAP that is not yet visualized on CTA chest 07/28/2024, 11:38pm), which I think is unlikely, or patient may be suffering from acute bacteremia with the portal of entry being the right sole/ball.    I will check repeat vitals, chest exam, right sole/ball exam, WBC with differential, lactic acid, procalcitonin, surveillance blood cultures #1 and #2 (07/28/2024, 11:39pm), and surveillance right ball of foot wound  culture in the 07/29/2024 am.    Aortic stenosis with trileaflet valve  Assessment & Plan  Patient has no complaints of angina, syncope, or acute-on-chronic diastolic CHF on admitting date 07/28/2024.    Instead, patient emphatically reports of a 3 weeks long history of acute right-sided posterior neck and right-sided anterior shoulder pain that has been constant 24 hours a day, 7 days a week, for the past 3 weeks, and is non-pleuritic, non-traumatic, non-radiating, non-exertional, and non-positional, and which prevents the patient from eating and sleeping.    Patient subsequently reports that the 3 weeks long history of acute right-sided posterior neck and right-sided anterior shoulder pain has resolved  spontaneously, and has been replaced on admitting date 07/28/2024 with acute left-sided posterior neck and acute left-sided anterior shoulder pain.  Moreover, deep palpation of patient's posterior neck bilaterally and anterior shoulders bilaterally reveal no tenderness at all.  Patient also reports that whenever he moves his left shoulder, that the left-sided anterior shoulder pain worsens from an 8 out of 10 intensity to a 10 out of 10 intensity.  Patient also emphatically denies any chest pain per se, and insists that the pain is in his left-sided posterior neck and his left-sided anterior shoulder.    Cf., 07/05/2024 TTE:  moderate aortic stenosis with mean/peak gradients of 30 mm Hg and 46 mm Hg, respectively, with IAIN 1 cm2 (by VTI).      In the absence of angina, syncope, or acute-manage chronic diastolic CHF exacerbation, the likelihood that patient's moderate aortic stenosis has worsened since 07/05/2024 TTE remains low.  Nonetheless, patient reports that he would like to know if his moderate aortic stenosis has worsened since 07/05/2024.  Hence, I have ordered repeat TTE for the 07/29/2024, 8 AM, to evaluate for any interval changes since 07/05/2024 TTE.        Follicular lymphoma grade I of lymph nodes of multiple sites (HCC)  Assessment & Plan  Patient has a past medical history of follicular B-cell lymphoma (diagnosed in 2020 by [facility]On[facility] Dr. Naomi Cummings, [facility]), s/p chemotherapy x 6 months, ended in Feburary 2022, now in clinical remission.  Observe.    Hypercholesteremia  Assessment & Plan  Hold off patient's home-scheduled rosuvastatin 40 mg p.o. every afternoon in the event that this medication is contributing to the patient's complaints of acute left-sided posterior neck pain and acute left-sided anterior shoulder pain on admitting date 07/28/2024.  In addition, patient awaits CK level testing to evaluate this patient for possible acute rhabdomyolysis if not  "acute myopathy associated with this statin class of medication.    Diabetes 1.5, managed as type 2 ([facility])  Assessment & Plan  Lab Results   Component Value Date    HGBA1C 6.8 (H) 07/05/2024       Recent Labs     07/29/24  0304   POCGLU 187*       Blood Sugar Average: Last 72 hrs:  (P) 187    Continue carbohydrate consistent diet, lispro insulin sliding scale qac + qhs, and POC glucose qac + qhs.       VTE Pharmacologic Prophylaxis:   Moderate Risk (Score 3-4) - Pharmacological DVT Prophylaxis Ordered: heparin.  Code Status: Level 1 - Full Code as per patient's wishes.  Discussion with family: Updated  (wife) at bedside.    Anticipated Length of Stay: Patient will be admitted on an inpatient basis with an anticipated length of stay of greater than 2 midnights secondary to acute hypoxic respiratory failure and sepsis without septic shock, both of unclear etiology, rule out acute bacteremia..    Total Time Spent on Date of Encounter in care of patient: 81 mins. This time was spent on one or more of the following: performing physical exam; counseling and coordination of care; obtaining or reviewing history; documenting in the medical record; reviewing/ordering tests, medications or procedures; communicating with other healthcare professionals and discussing with patient's family/caregivers.    Chief Complaint: \"The back of my right neck and the front of my right shoulder have hurt everyday for the past 3 weeks.  It's constant and I can't sleep or eat because of the pain.  Now, today, the pain has shifted to the back of my left neck and the front of my left shoulder.  I do not have any chest pain.  I do not feel short of breath.\"    History of Present Illness:  Augustus Coffman is a 68 y.o. male with a PMH of FULL CODE @ home, obesity with BMI 31.72 (height 193.0 cm; weight 118.0 kg), hyperlipidemia, HTN, chronic diastolic CHF with preserved LV EF 62%, grade I LV diastolic dysfunction, and normal RV " systolic function (as noted on 07/05/2024 TTE), non-insulin dependent DM2 with HbA1c 6.8% (07/05/2024), moderate aortic stenosis with mean/peak gradients of 30 mm Hg and 46 mm Hg, respectively, with IAIN 1 cm2 (by VTI)(as noted on 07/05/2024 TTE), and follicular B-cell lymphoma (diagnosed in 2020 by [facility]On[facility] Dr. Naomi Cummings, [facility]), s/p chemotherapy x 6 months, ended in Feburary 2022, now in clinical remission, who presents with complaints of constant right posterior neck and right front shoulder pain, preventing the patient from eating or sleeping, for the past 3 weeks, and which has transitioned on 07/28/2024 to constant left posterior neck and left front shoulder pain, without any chest pain or SOB/JUAREZ, cough/wheeze, or hemoptysis on 07/28/2024.      Patient is afebrile at 100.2 °F (07/28/2024, 9:44 PM), but I note that patient also carries a diagnosis of follicular B-cell lymphoma (diagnosed in 2020 by [facility] Heme-On[facility] Dr. Naomi Cummings, [facility]), s/p chemotherapy x 6 months, ended in Feburary 2022, now in clinical remission; hence, patient is immunosuppressed and may be incapable of mounting a paula mayito fever of 100.4 °F or greater.    In addition, patient has an admitting heart rate of 135 bpm and a respiratory rate of 20 breaths/min, saturating 95% on room air (07/28/2024, 9:44 PM) with repeat O2 saturation 88% on room air (07/28/2024, 10:30 PM), and repeat O2 saturation 95% on 4 liters/minute O2 via NC (07/28/2024, 10:31pm).   Exam was noted for a chest that was clear to auscultation and nontender to palpation.  In addition, patient's right sole was noted for an open 1 cm ulcer at the ball of patient's right sole and which was negative for erythema (cf., the patient's entire right foot and right lower shin were actually dark brown/cyanotic), edema, induration, warmth (cf., the patient's entire right foot and right lower shin were actually cold  to touch), tenderness, crepitus, fluctuance, serous/sanguinous/suppurative discharge, malodor, and lymphangitic streaking.    Labs were noted for an elevated white blood cell count of 28.63, neutrophils 90%, bands 3%, lymphocytes 2%, and monocytes 5% (07/28/2024, 10:18 PM).  Patient also has an elevated procalcitonin #1 0.59 ng/mL and a normal lactic acid #1 1.3 mmol/L (07/28/2024, 10:18pm)    Additional testing included CTA chest (07/28/2024, 11:38pm):      1.  No acute pulmonary embolism to the segmental level with evaluation of the more peripheral subsegmental branches limited by timing of contrast bolus and inadequate contrast opacification.  2.  Ectatic ascending thoracic aorta again noted measuring up to 4.6 cm in diameter, not significantly changed. No aortic dissection.  No pericardial effusion.  3.  Diffuse wall thickening of the left ventricle including interventricular septum noted suggesting left ventricular hypertrophy.  4.  No lobar airspace consolidation/pneumonia.   5.  No pleural or pericardial effusions.      Patient was subsequently admitted to the inpatient hospitalist service at [facility]) on 07/28/2024 with the following diagnosis:    1.  Acute hypoxic respiratory failure, R/O bacteremia.    To address #1, patient was started empirically on 4 liters/minute O2 via nasal cannula in this patient who is normally oxygen-naive at home and has no history of tobacco abuse (primary/secondary), and no history of asthma, COPD, or intubation/mechanical ventilation.     Patient was also started on ceftriaxone 1 g IV x 1 dose (07/28/2024, 11:30 PM) in [facility] ([facility]  Patient will continue with ceftriaxone 2 g IV daily (day 1 of 3 on 07/29/2024, 11 PM) and vancomycin 1 g IV q12 (day #1/3 on 07/29/2024, 3:00 PM) in [facility] Telemetry bed #431-1, as patient may be suffering from an incipient CAP that is not yet visualized on CTA chest 07/28/2024,  11:38pm), which I think is unlikely, or patient may be suffering from acute bacteremia with the portal of entry being the right sole/ball.    I will check repeat vitals, chest exam, right sole/ball exam, WBC with differential, lactic acid, procalcitonin, surveillance blood cultures #1 and #2 (07/28/2024, 11:39pm), and surveillance right ball of foot wound  culture in the 07/29/2024 am.      Review of Systems:  Review of Systems   Musculoskeletal:  Positive for back pain and neck pain.   All other systems reviewed and are negative.      Past Medical and Surgical History:   Past Medical History:   Diagnosis Date    Arthritis 2010's    Occasionally flares up    Cancer (HCC) May 2021    Still investigating    Chronic kidney disease     Diabetes mellitus (HCC)     Follicular lymphoma (HCC)     GERD (gastroesophageal reflux disease) June 2021    Noticed in an Endoscopy    Heart murmur May 2020    High cholesterol     Hypertension     Kidney stone 1980's    Have had since 1980's    Obesity Since Childhood       Past Surgical History:   Procedure Laterality Date    BUNIONECTOMY Right 11/24/2020    Procedure: RIGHT HAV CORRECTION,;  Surgeon: Niranjan Child DPM;  Location: [facility];  Service: Podiatry    COLONOSCOPY      INCISION AND DRAINAGE OF WOUND Right 10/05/2016    Procedure: INCISION AND DRAINAGE (I&D) EXTREMITY;  Surgeon: Niranjan Child DPM;  Location: [facility];  Service:     IR BIOPSY LYMPH NODE  07/08/2021    IR EMBOLIZATION (SPECIFY VESSEL OR SITE)  07/08/2021    IR PORT PLACEMENT  9/1/2022    PILONIDAL CYST EXCISION      DC CORRECTION HAMMERTOE Right 02/19/2019    Procedure: THIRD HAMMER TOE CORRECTION;  Surgeon: Niranjan Child DPM;  Location: [facility];  Service: Podiatry    DC RMVL MATEO CTR VAD W/SUBQ PORT/ CTR/PRPH INSJ N/A 3/14/2023    Procedure: REMOVAL VENOUS PORT (PORT-A-CATH)IR;  Surgeon: Avelino Vázquez DO;  [facility];  Service: Interventional Radiology    TOE OSTEOTOMY Right 03/14/2017     Procedure: HAMMERTOE CORRECTION R 2 ;  Surgeon: Niranjan Child DPM;  [facility];  Service:     TOE OSTEOTOMY Left 11/24/2020    Procedure: LEFT HT CORRECTION TOE;  Surgeon: Niranjan Child DPM;  [facility]: [facility];  Service: Podiatry    TONSILLECTOMY  1963       Meds/Allergies:  Prior to Admission medications    Medication Sig Start Date End Date Taking? Authorizing Provider   allopurinol (ZYLOPRIM) 300 mg tablet Take 1 tablet (300 mg total) by mouth daily 4/12/24  Yes Gwen Lazo DO   aspirin 81 mg chewable tablet Chew 81 mg daily   Yes Historical Provider, MD   Cholecalciferol 100 MCG (4000 UT) CAPS Take 1 capsule (4,000 Units total) by mouth in the morning 4/22/24  Yes Jorden Heath MD   cyclobenzaprine (FLEXERIL) 10 mg tablet Take 1 tablet (10 mg total) by mouth 3 (three) times a day as needed for muscle spasms 7/26/24  Yes Gwen Lazo DO   Diclofenac Sodium (VOLTAREN) 1 % Apply 2 g topically 4 (four) times a day 7/12/24  Yes Ricardo Jacques,    Diclofenac Sodium (VOLTAREN) 1 % Apply 2 g topically 4 (four) times a day For pain to affected area 7/26/24  Yes Irineo Rome PA-C   Empagliflozin (Jardiance) 25 MG TABS TAKE 1 TABLET BY MOUTH EVERY DAY IN THE MORNING 7/2/24  Yes Jroden Heath MD   hydroCHLOROthiazide 25 mg tablet Take 1 tablet (25 mg total) by mouth daily 4/12/24  Yes Gwen Lazo DO   lidocaine (LMX) 4 % cream Apply topically as needed for mild pain 7/26/24  Yes Irineo Rome PA-C   losartan (COZAAR) 100 MG tablet Take 1 tablet (100 mg total) by mouth daily 4/12/24  Yes Gwen Lazo DO   metFORMIN (GLUCOPHAGE) 500 mg tablet TAKE 2 TABLETS BY MOUTH TWICE A DAY WITH FOOD 5/27/24  Yes Gwen Lazo DO   metoprolol succinate (TOPROL-XL) 100 mg 24 hr tablet Take 1 tablet (100 mg total) by mouth every evening 4/12/24  Yes Gwen Lazo, DO   Multiple Vitamins-Minerals (Centrum Silver 50+Men) TABS    Yes Historical Provider, MD   oxyCODONE (Roxicodone) 5 immediate  release tablet Take 1 tablet (5 mg total) by mouth every 6 (six) hours as needed for moderate pain for up to 7 doses Max Daily Amount: 20 mg 24  Yes Carlito Oliver MD   rosuvastatin (CRESTOR) 40 MG tablet Take 1 tablet (40 mg total) by mouth every evening 24  Yes Gwen Lazo DO   amLODIPine (NORVASC) 10 mg tablet Take 1 tablet (10 mg total) by mouth daily 24   Gwen Lazo DO   lidocaine (Lidoderm) 5 % Apply 1 patch topically over 12 hours daily for 14 days Remove & Discard patch within 12 hours or as directed by MD 24  Carlito Oliver MD   methylPREDNISolone 4 MG tablet therapy pack Use as directed on package  Patient not taking: Reported on 2024 7/15/24   Gwen Lazo DO     I have reviewed home medications with patient personally.    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  Patient Pre-hospital Living Situation: Home, With spouse  Patient Pre-hospital Level of Mobility:  Patient walks without assistance from a cane, walker, or wheelchair.  Patient Pre-hospital Diet Restrictions: Low-fat, low-cholesterol, low-sodium, carbohydrate consistent diet.  Substance Use History:   Social History     Substance and Sexual Activity   Alcohol Use Never     Social History     Tobacco Use   Smoking Status Never   Smokeless Tobacco Never   Tobacco Comments    Never smoked but exposed to second hand smoke from birth until      Social History     Substance and Sexual Activity   Drug Use Never       Family History:  Patient's father is  at 68 years of age from chronic myelogenous leukemia.  Patient's mother is alive and well at 85 years of age.    Physical Exam:     Vitals:   Blood Pressure: 103/69 (24  "0300)  Pulse: (!) 112 (07/29/24 0300)  Temperature: 97.7 °F (36.5 °C) (07/29/24 0300)  Temp Source: Oral (07/29/24 0300)  Respirations: 18 (07/29/24 0300)  Height: 6' 4\" (193 cm) (07/29/24 0300)  Weight - Scale: 118 kg (260 lb 9.3 oz) (07/29/24 0600)  SpO2: 94 % (07/29/24 0500)    Physical Exam  Vitals reviewed.   Constitutional:       Appearance: He is obese.   HENT:      Head: Normocephalic and atraumatic.      Nose: Nose normal.      Mouth/Throat:      Mouth: Mucous membranes are moist.      Pharynx: Oropharynx is clear.   Eyes:      Extraocular Movements: Extraocular movements intact.      Conjunctiva/sclera: Conjunctivae normal.      Pupils: Pupils are equal, round, and reactive to light.   Cardiovascular:      Rate and Rhythm: Normal rate and regular rhythm.      Pulses: Normal pulses.      Heart sounds: Normal heart sounds.   Pulmonary:      Effort: Pulmonary effort is normal.      Breath sounds: Normal breath sounds.   Abdominal:      General: Abdomen is flat. Bowel sounds are normal.      Palpations: Abdomen is soft.   Musculoskeletal:         General: Normal range of motion.      Cervical back: Normal range of motion and neck supple.   Skin:     General: Skin is warm and dry.      Capillary Refill: Capillary refill takes less than 2 seconds.   Neurological:      General: No focal deficit present.      Mental Status: He is alert and oriented to person, place, and time. Mental status is at baseline.   Psychiatric:         Mood and Affect: Mood normal.         Thought Content: Thought content normal.         Judgment: Judgment normal.     No psychosis.    Additional Data:     Lab Results:  Results from last 7 days   Lab Units 07/29/24 0412 07/28/24  2218   WBC Thousand/uL 27.41* 28.63*   HEMOGLOBIN g/dL 12.8 13.6   HEMATOCRIT % 40.2 43.9   PLATELETS Thousands/uL 214 250   BANDS PCT %  --  3   LYMPHO PCT %  --  2*   MONO PCT %  --  5   EOS PCT %  --  0     Results from last 7 days   Lab Units 07/29/24 0415 " 07/28/24  2218   SODIUM mmol/L 134* 133*   POTASSIUM mmol/L 4.5 4.4   CHLORIDE mmol/L 96 96   CO2 mmol/L 24 21   BUN mg/dL 28* 27*   CREATININE mg/dL 1.52* 1.18   ANION GAP mmol/L 14* 16*   CALCIUM mg/dL 9.9 9.9   ALBUMIN g/dL  --  3.9   TOTAL BILIRUBIN mg/dL  --  0.66   ALK PHOS U/L  --  92   ALT U/L  --  43   AST U/L  --  29   GLUCOSE RANDOM mg/dL 196* 224*         Results from last 7 days   Lab Units 07/29/24  0304   POC GLUCOSE mg/dl 187*     Lab Results   Component Value Date    HGBA1C 6.8 (H) 07/05/2024    HGBA1C 6.7 (H) 03/02/2024    HGBA1C 8.4 (H) 11/20/2023     Results from last 7 days   Lab Units 07/29/24  0416 07/28/24  2245 07/28/24  2218   LACTIC ACID mmol/L 1.0 1.3  --    PROCALCITONIN ng/ml 2.95*  --  0.59*       Lines/Drains:  Invasive Devices       Central Venous Catheter Line  Duration             Port A Cath 09/01/22 Right Subclavian 696 days              Peripheral Intravenous Line  Duration             Peripheral IV 07/28/24 Right Antecubital <1 day                    Central Line:  Goal for removal: Will discontinue when peripheral access obtained.            Imaging: Reviewed radiology reports from this admission including: chest CT scan  CTA ED chest PE Study   Final Result by Gabriel Woods MD (07/28 6179)      1.  No acute pulmonary embolism to the segmental level with evaluation of the more peripheral subsegmental branches limited by timing of contrast bolus and inadequate contrast opacification.   2.  Ectatic ascending thoracic aorta again noted measuring up to 4.6 cm in diameter, not significantly changed. No aortic dissection.   3.  Diffuse wall thickening of the left ventricle including interventricular septum noted suggesting left ventricular hypertrophy.   4.  No lobar airspace consolidation/pneumonia. No pleural or pericardial effusions.      Workstation performed: [id number]         XR chest 2 views   Final Result by Angela Montes De Oca MD (07/28 5939)      No acute cardiopulmonary  disease.            Workstation performed: [id number]         XR foot 3+ views RIGHT    (Results Pending)       EKG and Other Studies Reviewed on Admission:   EKG: Sinus Tachycardia. , , , no acute ST elevations/depressions (by my review of low07/28/2024, 9:48pm EKG)-I okay.    ** Please Note: This note has been constructed using a voice recognition system. **

## 2024-07-29 NOTE — OCCUPATIONAL THERAPY NOTE
Occupational Therapy Cancellation Note     Patient Name: Augustus Coffman  Today's Date: 7/29/2024  Problem List  Principal Problem:    Sepsis without acute organ dysfunction (HCC)  Active Problems:    Diabetes 1.5, managed as type 2 (HCC)    Hypercholesteremia    FINA (acute kidney injury) (HCC)    Follicular lymphoma grade I of lymph nodes of multiple sites (HCC)    Aortic stenosis with trileaflet valve    Acute hypoxic respiratory failure (HCC)    Acute hyponatremia    Chronic diastolic CHF (congestive heart failure) (HCC)    Encounter for deep vein thrombosis (DVT) prophylaxis    Hyponatremia        07/29/24 1029   OT Last Visit   OT Visit Date 07/29/24   Note Type   Note type Cancelled Session   Cancel Reasons Medical status       OT order received and chart review performed. At this time, OT evaluation cancelled due to patient pending xray of foot. OT will follow and evaluate as appropriate.      Luana Juarez, CANDY, OTR/L

## 2024-07-30 LAB
ANION GAP SERPL CALCULATED.3IONS-SCNC: 10 MMOL/L (ref 4–13)
BUN SERPL-MCNC: 29 MG/DL (ref 5–25)
CALCIUM SERPL-MCNC: 9.4 MG/DL (ref 8.4–10.2)
CHLORIDE SERPL-SCNC: 99 MMOL/L (ref 96–108)
CO2 SERPL-SCNC: 25 MMOL/L (ref 21–32)
CREAT SERPL-MCNC: 1.2 MG/DL (ref 0.6–1.3)
ERYTHROCYTE [DISTWIDTH] IN BLOOD BY AUTOMATED COUNT: 16.7 % (ref 11.6–15.1)
GFR SERPL CREATININE-BSD FRML MDRD: 62 ML/MIN/1.73SQ M
GLUCOSE SERPL-MCNC: 157 MG/DL (ref 65–140)
GLUCOSE SERPL-MCNC: 165 MG/DL (ref 65–140)
GLUCOSE SERPL-MCNC: 168 MG/DL (ref 65–140)
GLUCOSE SERPL-MCNC: 171 MG/DL (ref 65–140)
GLUCOSE SERPL-MCNC: 172 MG/DL (ref 65–140)
GLUCOSE SERPL-MCNC: 180 MG/DL (ref 65–140)
HCT VFR BLD AUTO: 36.6 % (ref 36.5–49.3)
HGB BLD-MCNC: 11.5 G/DL (ref 12–17)
MCH RBC QN AUTO: 25.4 PG (ref 26.8–34.3)
MCHC RBC AUTO-ENTMCNC: 31.4 G/DL (ref 31.4–37.4)
MCV RBC AUTO: 81 FL (ref 82–98)
PLATELET # BLD AUTO: 175 THOUSANDS/UL (ref 149–390)
PMV BLD AUTO: 9.9 FL (ref 8.9–12.7)
POTASSIUM SERPL-SCNC: 3.9 MMOL/L (ref 3.5–5.3)
PROCALCITONIN SERPL-MCNC: 2.81 NG/ML
RBC # BLD AUTO: 4.53 MILLION/UL (ref 3.88–5.62)
SODIUM SERPL-SCNC: 134 MMOL/L (ref 135–147)
VANCOMYCIN SERPL-MCNC: 18.1 UG/ML (ref 10–20)
WBC # BLD AUTO: 15.26 THOUSAND/UL (ref 4.31–10.16)

## 2024-07-30 PROCEDURE — 99233 SBSQ HOSP IP/OBS HIGH 50: CPT | Performed by: INTERNAL MEDICINE

## 2024-07-30 PROCEDURE — 80202 ASSAY OF VANCOMYCIN: CPT | Performed by: INTERNAL MEDICINE

## 2024-07-30 PROCEDURE — 82948 REAGENT STRIP/BLOOD GLUCOSE: CPT

## 2024-07-30 PROCEDURE — 84145 PROCALCITONIN (PCT): CPT | Performed by: INTERNAL MEDICINE

## 2024-07-30 PROCEDURE — 85027 COMPLETE CBC AUTOMATED: CPT

## 2024-07-30 PROCEDURE — 97167 OT EVAL HIGH COMPLEX 60 MIN: CPT

## 2024-07-30 PROCEDURE — 80048 BASIC METABOLIC PNL TOTAL CA: CPT | Performed by: INTERNAL MEDICINE

## 2024-07-30 PROCEDURE — 97163 PT EVAL HIGH COMPLEX 45 MIN: CPT

## 2024-07-30 RX ORDER — CEFAZOLIN SODIUM 2 G/50ML
2000 SOLUTION INTRAVENOUS EVERY 8 HOURS
Status: DISCONTINUED | OUTPATIENT
Start: 2024-07-30 | End: 2024-08-03 | Stop reason: HOSPADM

## 2024-07-30 RX ORDER — ATORVASTATIN CALCIUM 40 MG/1
80 TABLET, FILM COATED ORAL
Status: DISCONTINUED | OUTPATIENT
Start: 2024-07-30 | End: 2024-08-03 | Stop reason: HOSPADM

## 2024-07-30 RX ADMIN — ASPIRIN 81 MG: 81 TABLET, CHEWABLE ORAL at 08:30

## 2024-07-30 RX ADMIN — CEFAZOLIN SODIUM 2000 MG: 2 SOLUTION INTRAVENOUS at 19:59

## 2024-07-30 RX ADMIN — VANCOMYCIN HYDROCHLORIDE 1000 MG: 1 INJECTION, SOLUTION INTRAVENOUS at 16:10

## 2024-07-30 RX ADMIN — Medication 2.5 MG: at 08:30

## 2024-07-30 RX ADMIN — CEFAZOLIN SODIUM 2000 MG: 2 SOLUTION INTRAVENOUS at 12:34

## 2024-07-30 RX ADMIN — INSULIN LISPRO 2 UNITS: 100 INJECTION, SOLUTION INTRAVENOUS; SUBCUTANEOUS at 21:34

## 2024-07-30 RX ADMIN — HEPARIN SODIUM 5000 UNITS: 5000 INJECTION INTRAVENOUS; SUBCUTANEOUS at 13:51

## 2024-07-30 RX ADMIN — VANCOMYCIN HYDROCHLORIDE 1000 MG: 1 INJECTION, SOLUTION INTRAVENOUS at 02:46

## 2024-07-30 RX ADMIN — HEPARIN SODIUM 5000 UNITS: 5000 INJECTION INTRAVENOUS; SUBCUTANEOUS at 05:14

## 2024-07-30 RX ADMIN — Medication 2.5 MG: at 13:52

## 2024-07-30 RX ADMIN — ATORVASTATIN CALCIUM 80 MG: 40 TABLET, FILM COATED ORAL at 16:09

## 2024-07-30 RX ADMIN — HEPARIN SODIUM 5000 UNITS: 5000 INJECTION INTRAVENOUS; SUBCUTANEOUS at 21:34

## 2024-07-30 RX ADMIN — Medication 2.5 MG: at 22:58

## 2024-07-30 RX ADMIN — ACETAMINOPHEN 650 MG: 325 TABLET, FILM COATED ORAL at 18:21

## 2024-07-30 RX ADMIN — INSULIN LISPRO 2 UNITS: 100 INJECTION, SOLUTION INTRAVENOUS; SUBCUTANEOUS at 12:34

## 2024-07-30 RX ADMIN — EMPAGLIFLOZIN 25 MG: 25 TABLET, FILM COATED ORAL at 08:36

## 2024-07-30 RX ADMIN — INSULIN LISPRO 2 UNITS: 100 INJECTION, SOLUTION INTRAVENOUS; SUBCUTANEOUS at 16:18

## 2024-07-30 RX ADMIN — CYCLOBENZAPRINE HYDROCHLORIDE 10 MG: 10 TABLET, FILM COATED ORAL at 18:21

## 2024-07-30 RX ADMIN — INSULIN LISPRO 2 UNITS: 100 INJECTION, SOLUTION INTRAVENOUS; SUBCUTANEOUS at 08:31

## 2024-07-30 NOTE — ASSESSMENT & PLAN NOTE
Lab Results   Component Value Date    HGBA1C 6.8 (H) 07/05/2024       Recent Labs     07/29/24  2334 07/30/24  0116 07/30/24  0638 07/30/24  1049   POCGLU 159* 180* 165* 171*         Blood Sugar Average: Last 72 hrs:  (P) [id number]    Continue carbohydrate consistent diet, lispro insulin sliding scale qac + qhs, and POC glucose qac + qhs

## 2024-07-30 NOTE — ASSESSMENT & PLAN NOTE
Start pharmacologic DVT prophylaxis utilizing heparin 5000 units SQ every 8 hours (07/29/2024, 6 AM).  Of note, patient has no complaints of calf pain, leg swelling, or pleurisy, to suggest either DVT or PE on admission date 07/28/2024

## 2024-07-30 NOTE — ASSESSMENT & PLAN NOTE
Wt Readings from Last 3 Encounters:   07/30/24 122 kg (269 lb 13.5 oz)   07/25/24 121 kg (267 lb 10.2 oz)   07/17/24 121 kg (266 lb 6.4 oz)       Was given fluids per sepsis protocol and has improved.  No evidence of volume overload  Per 07/05/2024 TTE: LV EF 62%, grade I LV  diastolic dysfunction.  RV systolic function normal.

## 2024-07-30 NOTE — ASSESSMENT & PLAN NOTE
Patient has no complaints of angina, syncope, or acute-on-chronic diastolic CHF on admitting date 07/28/2024.  Moderate aortic stenosis present.  Will need outpatient follow-up with cardiology

## 2024-07-30 NOTE — ASSESSMENT & PLAN NOTE
Patient restarted on statins, Lipitor 80 mg as a substitute for Crestor 40 mg daily that he takes at home

## 2024-07-30 NOTE — PROGRESS NOTES
Augustus Coffman is a 68 y.o. male who is currently ordered Vancomycin IV with management by the Pharmacy Consult service.  Relevant clinical data and objective / subjective history reviewed.  Vancomycin Assessment:  Indication and Goal AUC/Trough: Pneumonia (goal -600, trough >10), -600, trough >10  Clinical Status: stable  Micro:     Renal Function:  SCr: 1.2 mg/dL  CrCl: 76 mL/min  Renal replacement: Not on dialysis  Days of Therapy: 2  Current Dose: 1000mg IV q12h  Vancomycin Plan:  New Dosing: continue current dose, next level 8/6  Estimated AUC: 538 mcg*hr/mL  Estimated Trough: 17.7 mcg/mL  Next Level: 07/31/24 with AM labs  Renal Function Monitoring: Daily BMP and UOP  Pharmacy will continue to follow closely for s/sx of nephrotoxicity, infusion reactions and appropriateness of therapy.  BMP and CBC will be ordered per protocol. We will continue to follow the patient’s culture results and clinical progress daily.    Erika Frey, Pharmacist

## 2024-07-30 NOTE — PLAN OF CARE
Problem: PAIN - ADULT  Goal: Verbalizes/displays adequate comfort level or baseline comfort level  Description: Interventions:  - Encourage patient to monitor pain and request assistance  - Assess pain using appropriate pain scale  - Administer analgesics based on type and severity of pain and evaluate response  - Implement non-pharmacological measures as appropriate and evaluate response  - Consider cultural and social influences on pain and pain management  - Notify physician/advanced practitioner if interventions unsuccessful or patient reports new pain  Outcome: Progressing     Problem: SAFETY ADULT  Goal: Patient will remain free of falls  Description: INTERVENTIONS:  - Educate patient/family on patient safety including physical limitations  - Instruct patient to call for assistance with activity   - Consult OT/PT to assist with strengthening/mobility   - Keep Call bell within reach  - Keep bed low and locked with side rails adjusted as appropriate  - Keep care items and personal belongings within reach  - Initiate and maintain comfort rounds  - Make Fall Risk Sign visible to staff  - Offer Toileting every 2 Hours, in advance of need  - Initiate/Maintain bed alarm  - Obtain necessary fall risk management equipment:    - Apply yellow socks and bracelet for high fall risk patients  - Consider moving patient to room near nurses station  Outcome: Progressing  Goal: Maintain or return to baseline ADL function  Description: INTERVENTIONS:  -  Assess patient's ability to carry out ADLs; assess patient's baseline for ADL function and identify physical deficits which impact ability to perform ADLs (bathing, care of mouth/teeth, toileting, grooming, dressing, etc.)  - Assess/evaluate cause of self-care deficits   - Assess range of motion  - Assess patient's mobility; develop plan if impaired  - Assess patient's need for assistive devices and provide as appropriate  - Encourage maximum independence but intervene and  supervise when necessary  - Involve family in performance of ADLs  - Assess for home care needs following discharge   - Consider OT consult to assist with ADL evaluation and planning for discharge  - Provide patient education as appropriate  Outcome: Progressing

## 2024-07-30 NOTE — OCCUPATIONAL THERAPY NOTE
Occupational Therapy Evaluation        Patient Name: Augustus Coffman  Today's Date: 7/30/2024 07/30/24 0825   OT Last Visit   OT Visit Date 07/30/24   Note Type   Note type Evaluation   Pain Assessment   Pain Assessment Tool 0-10   Pain Score 8   Pain Location/Orientation Orientation: Right;Location: Neck;Location: Shoulder   Pain Onset/Description Onset: Ongoing   Hospital Pain Intervention(s) Repositioned;Ambulation/increased activity   Restrictions/Precautions   Weight Bearing Precautions Per Order Yes   RLE Weight Bearing Per Order (S)  NWB  (per podiatry note on 6/27/24; pt reports he is able to place the heel on the floor which is written in podiatry note from 5/30/24--further clarification is required)   Braces or Orthoses Other (Comment)  (R LE-surgical shoe donned)   Other Precautions Chair Alarm;Bed Alarm;WBS;Fall Risk;Pain   Home Living   Type of Home House  (split level)   Home Layout Two level;Able to live on main level with bedroom/bathroom;Performs ADLs on one level;Stairs to enter with rails  (10 LARY; 10 steps to 2nd floor main area)   Bathroom Shower/Tub Walk-in shower   Bathroom Toilet Standard   Bathroom Equipment Other (Comment)  (none reported)   Bathroom Accessibility Accessible   Home Equipment Crutches   Additional Comments Pt ambulates without an AD, reports he has been using crutches due to his foot wound. Pt reports he sleeps in a recliner.   Prior Function   Level of [place] Independent with functional mobility;Independent with ADLs;Independent with IADLS   Lives With Spouse   Receives Help From Family   IADLs Independent with driving;Independent with meal prep;Independent with medication management   Falls in the last 6 months 0   Vocational Full time employment   Lifestyle   Autonomy Patient reported independent  with ADLs/ IADLs. Patient lives with family in a 2 story house, 10 LARY; 10 steps to 2nd floor main area. Ambulatory without AD   Reciprocal Relationships  Supportive Family   General   Family/Caregiver Present Yes  (wife)   ADL   Eating Assistance 5  Supervision/Setup   Grooming Assistance 4  Minimal Assistance   UB Bathing Assistance 3  Moderate Assistance   LB Bathing Assistance 2  Maximal Assistance   UB Dressing Assistance 3  Moderate Assistance   LB Dressing Assistance 2  Maximal Assistance   Toileting Assistance  2  Maximal Assistance   Functional Assistance 3  Moderate Assistance   Bed Mobility   Additional Comments Pt was received seated in recliner in NAD, /97   Transfers   Sit to Stand 4  Minimal assistance   Additional items Assist x 2;Armrests;Increased time required;Verbal cues   Stand to Sit 4  Minimal assistance   Additional items Assist x 2;Armrests;Increased time required;Verbal cues   Additional Comments Pt educated to maintain NWB on R LE; however pt reporting that he is able to WB through his heel; further mobility deferred pending clarification of WB status on R LE   Balance   Static Sitting Fair +   Dynamic Sitting Fair   Static Standing Poor +   Activity Tolerance   Activity Tolerance Patient limited by fatigue   Medical Staff Made Aware Co-evaluation performed with OT secondary to complex medical condition of patient and regression of functional status from baseline.   RUE Assessment   RUE Assessment   (limited shoulder movements, bilaterally due to pain L > R/ impaired functional strength)   LUE Assessment   LUE Assessment   (limited shoulder movements, bilaterally due to pain L > R/ impaired functional strength)   Hand Function   Gross Motor Coordination Impaired   Fine Motor Coordination Impaired   Vision-Basic Assessment   Current Vision Wears glasses only for reading   Psychosocial   Psychosocial (WDL) WDL   Cognition   Overall Cognitive Status WFL   Arousal/Participation Responsive;Alert;Cooperative   Attention Within functional limits   Orientation Level Oriented X4   Memory Decreased recall of precautions   Following Commands  Follows all commands and directions without difficulty   Assessment   Limitation Decreased ADL status;Decreased UE strength;Decreased UE ROM;Decreased endurance;Decreased self-care trans;Decreased high-level ADLs   Prognosis Good   Assessment Patient is a 68 y.o. male seen for OT evaluation s/p admit to [facility] on 7/28/2024 w/Sepsis without acute organ dysfunction (HCC). Commorbidities affecting patient's functional performance at time of assessment include: type 2 DM, hypercholesteremia, FINA on CKD stage 2, follicular lymphoma grade 1 of multiple lymph node sites, aortic stenosis with trileaflet valve, acute hypoxic respiratory failure, acute hyponatremia, chronic diastolic CHF, and encounter for DVT prophylaxis.   Orders placed for OT evaluation and treatment.  Performed at least two patient identifiers during session including name and wristband.  Prior to admission, Patient reported independent with ADLs/ IADLs. Patient lives with family in a 2 story house, 10 LARY; 10 steps to 2nd floor main area. Ambulatory without AD . Personal factors affecting patient at time of initial evaluation include: steps to enter, difficulty performing ADLs, and difficulty performing IADLs. Upon evaluation, patient requires moderate assist for UB ADLs, moderate and maximal assist for LB ADLs.  Occupational performance is affected by the following deficits: decreased functional use of BUEs, decreased muscle strength, degenerative arthritic joint changes, impaired gross motor coordination, dynamic sit/ stand balance deficit with poor standing tolerance time for self care and functional mobility, decreased activity tolerance, decreased safety awareness, and postural control and postural alignment deficit, requiring external assistance to complete transitional movements.  Patient to benefit from continued Occupational Therapy treatment while in the hospital to address deficits as defined above and maximize level of  functional independence with ADLs and functional mobility. Occupational Performance areas to address include: bathing/ shower, dressing, toilet hygiene, transfer to all surfaces, functional mobility, emergency response, health maintenance, and IADLs: safety procedures. From OT standpoint, recommendation at time of d/c would be Level 2.   Plan   Treatment Interventions ADL retraining;Functional transfer training;UE strengthening/ROM;Endurance training;Patient/family training;Equipment evaluation/education;Compensatory technique education;Energy conservation;Activityengagement;Continued evaluation   Goal Expiration Date 08/12/24   OT Frequency 3-5x/wk   Discharge Recommendation   Rehab Resource Intensity Level, OT II (Moderate Resource Intensity)   AM-PAC Daily Activity Inpatient   Lower Body Dressing 2   Bathing 2   Toileting 2   Upper Body Dressing 3   Grooming 3   Eating 3   Daily Activity Raw Score 15   Daily Activity Standardized Score (Calc for Raw Score >=11) 34.69   AM-PAC Applied Cognition Inpatient   Following a Speech/Presentation 4   Understanding Ordinary Conversation 4   Taking Medications 4   Remembering Where Things Are Placed or Put Away 4   Remembering List of 4-5 Errands 4   Taking Care of Complicated Tasks 4   Applied Cognition Raw Score 24   Applied Cognition Standardized Score 62.21   Barthel Index   Feeding 10   Bathing 0   Grooming Score 0   Dressing Score 5   Bladder Score 10   Bowels Score 10   Toilet Use Score 5   Transfers (Bed/Chair) Score 5   Mobility (Level Surface) Score 0   Stairs Score 0   Barthel Index Score 45         1 - Patient will verbalize and demonstrate use of energy conservation/ deep breathing technique and work simplification skills during functional activity with no verbal cues.    2 - Patient will verbalize and demonstrate good body mechanics and joint protection techniques during  ADLs/ IADLs with no verbal cues.    3 - Patient will increase OOB/ sitting tolerance to  2-4 hours per day for increased participation in self care and leisure tasks with no s/s of exertion.    4 - Patient will increase standing tolerance time to 5  minutes with unilateral UE support to complete sink level ADLs@ mod I level.    5 - Patient will increase sitting tolerance at edge of bed to 20 minutes to complete UB ADLs @ set up assist level.    6 - Patient will transfer bed to Chair / toilet at Set up assist level with AD as indicated.     7 - Patient will complete UB ADLs with set up assist.     8 - Patient will complete LB ADLs with min assist with the use of adaptive equipment.     9 - Patient will complete toileting hygiene with set up assist/ supervision for thoroughness    10 - Patient/ Family  will demonstrate competency with UE Home Exercise Program.

## 2024-07-30 NOTE — ASSESSMENT & PLAN NOTE
Patient has a past medical history of follicular B-cell lymphoma (diagnosed in 2020 by [facility] Dr. Naomi Cummings, [facility]), s/p chemotherapy x 6 months, ended in Feburary 2022, now in clinical remission.  Observe during the hospitalization

## 2024-07-30 NOTE — PLAN OF CARE
Problem: PAIN - ADULT  Goal: Verbalizes/displays adequate comfort level or baseline comfort level  Description: Interventions:  - Encourage patient to monitor pain and request assistance  - Assess pain using appropriate pain scale  - Administer analgesics based on type and severity of pain and evaluate response  - Implement non-pharmacological measures as appropriate and evaluate response  - Consider cultural and social influences on pain and pain management  - Notify physician/advanced practitioner if interventions unsuccessful or patient reports new pain  Outcome: Progressing     Problem: INFECTION - ADULT  Goal: Absence or prevention of progression during hospitalization  Description: INTERVENTIONS:  - Assess and monitor for signs and symptoms of infection  - Monitor lab/diagnostic results  - Monitor all insertion sites, i.e. indwelling lines, tubes, and drains  - Monitor endotracheal if appropriate and nasal secretions for changes in amount and color  - [place] appropriate cooling/warming therapies per order  - Administer medications as ordered  - Instruct and encourage patient and family to use good hand hygiene technique  - Identify and instruct in appropriate isolation precautions for identified infection/condition  Outcome: Progressing     Problem: SAFETY ADULT  Goal: Patient will remain free of falls  Description: INTERVENTIONS:  - Educate patient/family on patient safety including physical limitations  - Instruct patient to call for assistance with activity   - Consult OT/PT to assist with strengthening/mobility   - Keep Call bell within reach  - Keep bed low and locked with side rails adjusted as appropriate  - Keep care items and personal belongings within reach  - Initiate and maintain comfort rounds  - Make Fall Risk Sign visible to staff  - Offer Toileting every  Hours, in advance of need  - Initiate/Maintain alarm  - Obtain necessary fall risk management equipment:   - Apply yellow socks and  bracelet for high fall risk patients  - Consider moving patient to room near nurses station  Outcome: Progressing

## 2024-07-30 NOTE — PLAN OF CARE
Problem: OCCUPATIONAL THERAPY ADULT  Goal: Performs self-care activities at highest level of function for planned discharge setting.  See evaluation for individualized goals.  Description: Treatment Interventions: ADL retraining, Functional transfer training, UE strengthening/ROM, Endurance training, Patient/family training, Equipment evaluation/education, Compensatory technique education, Energy conservation, Activityengagement, Continued evaluation          See flowsheet documentation for full assessment, interventions and recommendations.   Note: Limitation: Decreased ADL status, Decreased UE strength, Decreased UE ROM, Decreased endurance, Decreased self-care trans, Decreased high-level ADLs  Prognosis: Good  Assessment: Patient is a 68 y.o. male seen for OT evaluation s/p admit to [facility] on 7/28/2024 w/Sepsis without acute organ dysfunction (HCC). Commorbidities affecting patient's functional performance at time of assessment include: type 2 DM, hypercholesteremia, FINA on CKD stage 2, follicular lymphoma grade 1 of multiple lymph node sites, aortic stenosis with trileaflet valve, acute hypoxic respiratory failure, acute hyponatremia, chronic diastolic CHF, and encounter for DVT prophylaxis.   Orders placed for OT evaluation and treatment.  Performed at least two patient identifiers during session including name and wristband.  Prior to admission, Patient reported independent with ADLs/ IADLs. Patient lives with family in a 2 story house, 10 LARY; 10 steps to 2nd floor main area. Ambulatory without AD . Personal factors affecting patient at time of initial evaluation include: steps to enter, difficulty performing ADLs, and difficulty performing IADLs. Upon evaluation, patient requires moderate assist for UB ADLs, moderate and maximal assist for LB ADLs.  Occupational performance is affected by the following deficits: decreased functional use of BUEs, decreased muscle strength, degenerative  arthritic joint changes, impaired gross motor coordination, dynamic sit/ stand balance deficit with poor standing tolerance time for self care and functional mobility, decreased activity tolerance, decreased safety awareness, and postural control and postural alignment deficit, requiring external assistance to complete transitional movements.  Patient to benefit from continued Occupational Therapy treatment while in the hospital to address deficits as defined above and maximize level of functional independence with ADLs and functional mobility. Occupational Performance areas to address include: bathing/ shower, dressing, toilet hygiene, transfer to all surfaces, functional mobility, emergency response, health maintenance, and IADLs: safety procedures. From OT standpoint, recommendation at time of d/c would be Level 2.     Rehab Resource Intensity Level, OT: II (Moderate Resource Intensity)

## 2024-07-30 NOTE — PROGRESS NOTES
[facility]  Progress Note  Name: Augustus Coffman I  MRN: [id number]  Unit/Bed#: -01 I Date of Admission: 7/28/2024   Date of Service: 7/30/2024 I Hospital Day: 1    Assessment & Plan   * Sepsis without acute organ dysfunction (HCC)  Assessment & Plan  Patient is afebrile at 100.2 °F (07/28/2024, 9:44 PM), but I note that patient also carries a diagnosis of follicular B-cell lymphoma (diagnosed in 2020 by [facility] Heme-On[facility] Dr. Naomi Cummings, [facility]), s/p chemotherapy x 6 months, ended in Feburary 2022, now in clinical remission; hence, patient is immunosuppressed and may be incapable of mounting a paula mayito fever of 100.4 °F or greater.    In addition, patient has an admitting heart rate of 135 bpm and a respiratory rate of 20 breaths/min, saturating 95% on room air (07/28/2024, 9:44 PM) with repeat O2 saturation 88% on room air (07/28/2024, 10:30 PM), and repeat O2 saturation 95% on 4 liters/minute O2 via NC (07/28/2024, 10:31pm).   Exam was noted for a chest that was clear to auscultation and nontender to palpation.  In addition, patient's right sole was noted for an open 1 cm ulcer at the ball of patient's right sole and which was negative for erythema (cf., the patient's entire right foot and right lower shin were actually dark brown/cyanotic), edema, induration, warmth (cf., the patient's entire right foot and right lower shin were actually cold to touch), tenderness, crepitus, fluctuance, serous/sanguinous/suppurative discharge, malodor, and lymphangitic streaking.    Labs were noted for an elevated white blood cell count of 28.63, neutrophils 90%, bands 3%, lymphocytes 2%, and monocytes 5% (07/28/2024, 10:18 PM).  Patient also has an elevated procalcitonin #1 0.59 ng/mL and a normal lactic acid #1 1.3 mmol/L (07/28/2024, 10:18pm)    Patient right foot wound growing Staphylococcus aureus, likely MSSA, will change the antibiotic to intravenous Ancef and  "continue vancomycin till we determine for sure that it is not MRSA.  Patient was hypotensive and was given fluids per sepsis protocol by me yesterday and his vital signs improved.  Blood pressure and WBC count both improved on 7/30/2024    Hyponatremia  Assessment & Plan  Lab Results   Component Value Date    SODIUM 134 (L) 07/29/2024    SODIUM 133 (L) 07/28/2024    SODIUM 136 07/11/2024        Encounter for deep vein thrombosis (DVT) prophylaxis  Assessment & Plan  Start pharmacologic DVT prophylaxis utilizing heparin 5000 units SQ every 8 hours (07/29/2024, 6 AM).  Of note, patient has no complaints of calf pain, leg swelling, or pleurisy, to suggest either DVT or PE on admission date 07/28/2024    Chronic diastolic CHF (congestive heart failure) (HCC)  Assessment & Plan  Wt Readings from Last 3 Encounters:   07/30/24 122 kg (269 lb 13.5 oz)   07/25/24 121 kg (267 lb 10.2 oz)   07/17/24 121 kg (266 lb 6.4 oz)       Was given fluids per sepsis protocol and has improved.  No evidence of volume overload  Per 07/05/2024 TTE: LV EF 62%, grade I LV  diastolic dysfunction.  RV systolic function normal.        Acute hyponatremia  Assessment & Plan  Sodium close to normal range    Acute hypoxic respiratory failure (HCC)  Assessment & Plan  Had a acute hypoxic respiratory failure on admission and needing 2 L of oxygen, patient seems to have significantly improved right now.  Saturations are 96% on room air.  This is likely due to improvement of the sepsis that he came in with    Blood pressure 119/97, pulse 80, temperature 98.8 °F (37.1 °C), resp. rate 17, height 6' 4\" (1.93 m), weight 122 kg (269 lb 13.5 oz), SpO2 96%.      Aortic stenosis with trileaflet valve  Assessment & Plan  Patient has no complaints of angina, syncope, or acute-on-chronic diastolic CHF on admitting date 07/28/2024.  Moderate aortic stenosis present.  Will need outpatient follow-up with cardiology        Follicular lymphoma grade I of lymph nodes of " multiple sites ([facility])  Assessment & Plan  Patient has a past medical history of follicular B-cell lymphoma (diagnosed in 2020 by [facility] Heme-On[facility] Dr. Naomi Cummings, [facility]), s/p chemotherapy x 6 months, ended in Feburary 2022, now in clinical remission.  Observe during the hospitalization    FINA (acute kidney injury) on CKD stage 2([facility])  Assessment & Plan  Lab Results   Component Value Date    CREATININE 1.52 (H) 07/29/2024    CREATININE 1.18 07/28/2024    CREATININE 1.87 (H) 07/11/2024    CREATININE 1.15 04/18/2024        Hypercholesteremia  Assessment & Plan  Patient restarted on statins, Lipitor 80 mg as a substitute for Crestor 40 mg daily that he takes at home    Diabetes 1.5, managed as type 2 ([facility])  Assessment & Plan  Lab Results   Component Value Date    HGBA1C 6.8 (H) 07/05/2024       Recent Labs     07/29/24  2334 07/30/24  0116 07/30/24  0638 07/30/24  1049   POCGLU 159* 180* 165* 171*         Blood Sugar Average: Last 72 hrs:  (P) [id number]    Continue carbohydrate consistent diet, lispro insulin sliding scale qac + qhs, and POC glucose qac + qhs           TE Pharmacologic Prophylaxis: Heparin    Patient Centered Rounds: I have performed bedside rounds with nursing staff today.  Discussions with Specialists or Other Care Team Provider: Wound care nurse  Education and Discussions with Family / Patient: Patient    Total time spent by me in patient management at the patient's bedside including discussions with wound care nurse team and discussions with patient and looking into the wound is 50 minutes    Current Length of Stay: 1 day(s)  Current Patient Status: Inpatient     Mobility:   Basic Mobility Inpatient Raw Score: 14  JH-HLM Goal: 4: Move to chair/commode  JH-HLM Achieved: 7: Walk 25 feet or more  JH-HLM Goal NOT achieved. Continue with multidisciplinary rounding and encourage appropriate mobility to improve upon JH-HLM goals.    Certification Statement: The  "patient will continue to require additional inpatient hospital stay due to Sepsis without acute organ dysfunction (HCC)  Discharge Plan / Estimated Discharge Date: 2 to 3 days    Code Status: Level 1 - Full Code  ______________________________________________________________________________    Subjective:   Patient seen and examined by me.  Patient does have weakness and the weakness is generalized.  No chest pain, palpitations or diaphoresis.  No fever or chills at this point of time.  No dysuria or diarrhea.  Patient states that the leg wound also has improved.  Patient does not complain of any severe shortness of breath or cough.  Not requiring oxygen today    Objective:   Vitals: Blood pressure 119/97, pulse 80, temperature 98.8 °F (37.1 °C), resp. rate 17, height 6' 4\" (1.93 m), weight 122 kg (269 lb 13.5 oz), SpO2 96%.    Physical Exam:   General appearance: alert, fatigued, appears stated age, and cooperative  Constitutional- looks a little weak  HEENT - atraumatic and normocephalic  Neck- supple  Skin - no fresh rash  Extremities no fresh focal deformities  Cardiovascular- S1-S2 heard  Respiratory- bilateral air entry present, no crackles or rhonchi  Skin - no fresh rash  Patient does have an ulceration in his right lower extremity and the ball of the great toe  Abdomen - normal bowel sounds present, no rebound tenderness  CNS- No fresh focal deficits  Psych- no acute psychosis     Additional Data:   Labs:  Results from last 7 days   Lab Units 07/30/24  0507 07/29/24  0412 07/28/24  2218   WBC Thousand/uL 15.26* 27.41* 28.63*   HEMOGLOBIN g/dL 11.5* 12.8 13.6   HEMATOCRIT % 36.6 40.2 43.9   MCV fL 81* 81* 82   TOTAL NEUT ABS Thousand/uL  --   --  26.63*   BANDS PCT %  --   --  3   PLATELETS Thousands/uL 175 214 250     Results from last 7 days   Lab Units 07/30/24  0507 07/29/24  0415 07/28/24  2218   SODIUM mmol/L 134* 134* 133*   POTASSIUM mmol/L 3.9 4.5 4.4   CHLORIDE mmol/L 99 96 96   CO2 mmol/L 25 24 21 "   ANION GAP mmol/L 10 14* 16*   BUN mg/dL 29* 28* 27*   CREATININE mg/dL 1.20 1.52* 1.18   CALCIUM mg/dL 9.4 9.9 9.9   ALBUMIN g/dL  --   --  3.9   TOTAL BILIRUBIN mg/dL  --   --  0.66   ALK PHOS U/L  --   --  92   ALT U/L  --   --  43   AST U/L  --   --  29   EGFR ml/min/1.73sq m 62 46 63   GLUCOSE RANDOM mg/dL 172* 196* 224*         Results from last 7 days   Lab Units 07/28/24  2218   CK TOTAL U/L 28*          Results from last 7 days   Lab Units 07/30/24  0507 07/29/24  2144 07/29/24  1805 07/29/24  0416 07/28/24  2245   LACTIC ACID mmol/L  --  1.4 2.1* 1.0 1.3   PROCALCITONIN ng/ml 2.81*  --  3.81* 2.95*  --      Results from last 7 days   Lab Units 07/30/24  1049 07/30/24  0638 07/30/24  0116 07/29/24  2334 07/29/24  2125 07/29/24  1821 07/29/24  1722 07/29/24  1601 07/29/24  1140 07/29/24  0710 07/29/24  0304   POC GLUCOSE mg/dl 171* 165* 180* 159* 147* 261* 274* 236* 230* 170* 187*             * I Have Reviewed All Lab Data Listed Above.    Cultures:   Results from last 7 days   Lab Units 07/29/24  0846 07/28/24  2339 07/28/24  2203   BLOOD CULTURE   --  Staphylococcus aureus*  Staphylococcus aureus*  --    GRAM STAIN RESULT  No Polys or Bacteria seen Gram positive cocci in clusters*  Gram positive cocci in clusters*  --    WOUND CULTURE  3+ Growth of Staphylococcus aureus*  --   --    INFLUENZA A PCR   --   --  Negative     Results from last 7 days   Lab Units 07/28/24 2203   INFLUENZA A PCR  Negative   INFLUENZA B PCR  Negative   RSV PCR  Negative         Imaging:  Imaging Reports Reviewed Today Include:   XR foot 3+ views RIGHT    Result Date: 7/29/2024  Impression: No acute osseous abnormality. Chronic findings, as above. Computerized Assisted Algorithm (CAA) may have been used to analyze all applicable images. Workstation performed: [id number]     CTA ED chest PE Study    Result Date: 7/28/2024  Impression: 1.  No acute pulmonary embolism to the segmental level with evaluation of the more peripheral  subsegmental branches limited by timing of contrast bolus and inadequate contrast opacification. 2.  Ectatic ascending thoracic aorta again noted measuring up to 4.6 cm in diameter, not significantly changed. No aortic dissection. 3.  Diffuse wall thickening of the left ventricle including interventricular septum noted suggesting left ventricular hypertrophy. 4.  No lobar airspace consolidation/pneumonia. No pleural or pericardial effusions. Workstation performed: [id number]     XR chest 2 views    Result Date: 7/28/2024  Impression: No acute cardiopulmonary disease. Workstation performed: [id number]     Scheduled Meds:  Current Facility-Administered Medications   Medication Dose Route Frequency Provider Last Rate    acetaminophen  650 mg Oral Q6H PRN Irineo Pedersen MD      amLODIPine  10 mg Oral Daily Irineo Pedersen MD      aspirin  81 mg Oral Daily Irineo Pedersen MD      atorvastatin  80 mg Oral Daily With Dinner Jessee Batres MD      cefazolin  2,000 mg Intravenous Q8H May Kalyn Reyes MD 2,000 mg (07/30/24 1234)    cyclobenzaprine  10 mg Oral TID PRN Irineo Pedersen MD      heparin (porcine)  5,000 Units Subcutaneous Q8H Irineo Pedersen MD      HYDROmorphone  0.2 mg Intravenous Q2H PRN May Kalyn Reyes MD      insulin lispro  2-12 Units Subcutaneous TID AC May Kalyn Reyes MD      insulin lispro  2-12 Units Subcutaneous HS May Kalyn Reyes MD      losartan  100 mg Oral Daily Irineo Pedersen MD      metoprolol succinate  100 mg Oral QPM Irineo Pedersen MD      morphine injection  4 mg Intravenous Q4H PRN May Kalyn Reyes MD      nitroglycerin  0.4 mg Sublingual Q5 Min PRN Irineo Pedersen MD      oxyCODONE  2.5 mg Oral Q4H PRN May Kalyn Reyes MD      Or    oxyCODONE  5 mg Oral Q4H PRN May Kalyn Reyes MD      vancomycin  1,000 mg Intravenous Q12H Irineo Pedersen MD 1,000 mg (07/30/24 0246)       Jessee Batres MD  [facility]      ** Please Note: This note has been constructed using a voice recognition system. **

## 2024-07-30 NOTE — CASE MANAGEMENT
Case Management Discharge Planning Note    Patient name Augustus Coffman  Location /-01 MRN [id number]  : 1955 Date 2024       Current Admission Date: 2024  Current Admission Diagnosis:Sepsis without acute organ dysfunction ([facility])   Patient Active Problem List    Diagnosis Date Noted Date Diagnosed    Sepsis without acute organ dysfunction ([facility]) 2024     Acute hypoxic respiratory failure ([facility]) 2024     Acute hyponatremia 2024     Chronic diastolic CHF (congestive heart failure) ([facility]) 2024     Encounter for deep vein thrombosis (DVT) prophylaxis 2024     Hyponatremia 2024     Aortic stenosis with trileaflet valve 07/15/2024     Ectatic thoracic aorta ([facility]) 2024     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 ([facility]) 10/09/2023     Vitamin D deficiency 2023     Stage 3a chronic kidney disease ([facility]) 2023     Follicular lymphoma grade I of lymph nodes of multiple sites ([facility]) 08/15/2022     Obesity, morbid ([facility]) 2022     Other proteinuria 2022     Stage 2 chronic kidney disease 09/15/2021     Hypertensive kidney disease with stage 2 chronic kidney disease 09/15/2021     Rash 2021     Chronic venous hypertension (idiopathic) with ulcer of right lower extremity ([facility]) 2021     GI bleed 2021     Pleural effusion 2021     Heart murmur 2021     Retroperitoneal hematoma 2021     Mesenteric lymphadenopathy 2021     Acute blood loss anemia 2021     FINA (acute kidney injury) on CKD stage 2([facility]) 2021     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     Stasis dermatitis of both legs 2019     Hammer toe of right foot 2019     Obesity with body mass index 30 or greater 10/31/2016     Diabetes 1.5, managed as type 2 ([facility]) 10/06/2016     Hypertension 10/06/2016     Hypercholesteremia 10/06/2016     Diabetic peripheral neuropathy ([facility]) 11/10/2014     Gout 2007     Type 2 diabetes  mellitus (HCC) 12/06/2007       LOS (days): 1  Geometric Mean LOS (GMLOS) (days): 5.1  Days to GMLOS:3.4     OBJECTIVE:  Risk of Unplanned Readmission Score: 21.17         Current admission status: Inpatient   Preferred Pharmacy:   [id number]  [place] - [address]  [address] [address]  Phone: [phone] Fax: [phone]    [id number] - [facility] [place] - [address]  [address] [address]  Phone: [phone] Fax: [phone]    Primary Care Provider: Gwen Lazo DO    Primary Insurance: MEDICARE  Secondary Insurance: [facility]    DISCHARGE DETAILS:    Discharge planning discussed with:: patient and patient's spouse and daughter at bedside  Freedom of Choice: Yes  Comments - Freedom of Choice: CM maintained freedom of choice as it pertains to discharge planning. CM addressed STR rec w patient and family. CM provided patient choice lists for STR and HHC to patient and family at bedside. Patient and family verbalized being agreeable to review options. Patient reports he most likely will not want to go to a facility because he need to go back to work  and thinks he will prefer HHC.  CM contacted family/caregiver?: Yes  Were Treatment Team discharge recommendations reviewed with patient/caregiver?: Yes  Did patient/caregiver verbalize understanding of patient care needs?: Yes  Were patient/caregiver advised of the risks associated with not following Treatment Team discharge recommendations?: Yes    Contacts  Patient Contacts: Sri  Relationship to Patient:: Family  Contact Method: In Person  Reason/Outcome: Continuity of Care, Discharge Planning, Emergency Contact    Requested Home Health Care         Is the patient interested in HHC at discharge?: Yes    Other Referral/Resources/Interventions Provided:  Interventions: HHC, Short Term Rehab  Referral Comments: [facility] referrals sent in [facility] for HHC and STR this  morning, and patient choice lists provided to patient at bedside this afternoon.    Treatment Team Recommendation: Short Term Rehab  Discharge Destination Plan:: Home with Home Health Care  Transport at Discharge : Family

## 2024-07-30 NOTE — ASSESSMENT & PLAN NOTE
Patient is afebrile at 100.2 °F (07/28/2024, 9:44 PM), but I note that patient also carries a diagnosis of follicular B-cell lymphoma (diagnosed in 2020 by [facility] Heme-On[facility] Dr. Naomi Cummings, [facility]), s/p chemotherapy x 6 months, ended in Feburary 2022, now in clinical remission; hence, patient is immunosuppressed and may be incapable of mounting a paula mayito fever of 100.4 °F or greater.    In addition, patient has an admitting heart rate of 135 bpm and a respiratory rate of 20 breaths/min, saturating 95% on room air (07/28/2024, 9:44 PM) with repeat O2 saturation 88% on room air (07/28/2024, 10:30 PM), and repeat O2 saturation 95% on 4 liters/minute O2 via NC (07/28/2024, 10:31pm).   Exam was noted for a chest that was clear to auscultation and nontender to palpation.  In addition, patient's right sole was noted for an open 1 cm ulcer at the ball of patient's right sole and which was negative for erythema (cf., the patient's entire right foot and right lower shin were actually dark brown/cyanotic), edema, induration, warmth (cf., the patient's entire right foot and right lower shin were actually cold to touch), tenderness, crepitus, fluctuance, serous/sanguinous/suppurative discharge, malodor, and lymphangitic streaking.    Labs were noted for an elevated white blood cell count of 28.63, neutrophils 90%, bands 3%, lymphocytes 2%, and monocytes 5% (07/28/2024, 10:18 PM).  Patient also has an elevated procalcitonin #1 0.59 ng/mL and a normal lactic acid #1 1.3 mmol/L (07/28/2024, 10:18pm)    Patient right foot wound growing Staphylococcus aureus, likely MSSA, will change the antibiotic to intravenous Ancef and continue vancomycin till we determine for sure that it is not MRSA.  Patient was hypotensive and was given fluids per sepsis protocol by me yesterday and his vital signs improved.  Blood pressure and WBC count both improved on 7/30/2024

## 2024-07-30 NOTE — PHYSICAL THERAPY NOTE
Physical Therapy Evaluation     Patient's Name: Augustus Coffman    Admitting Diagnosis  Shortness of breath [R06.02]  Rash [R21]  Chest pain [R07.9]  Hypoxia [R09.02]    Problem List  Patient Active Problem List   Diagnosis    Diabetes 1.5, managed as type 2 (HCC)    Hypertension    Hypercholesteremia    Hammer toe of right foot    Stasis dermatitis of both legs    Diabetic peripheral neuropathy (HCC)    Gout    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HCC)    Obesity with body mass index 30 or greater    Type 2 diabetes mellitus (HCC)    Retroperitoneal hematoma    Mesenteric lymphadenopathy    Acute blood loss anemia    FINA (acute kidney injury) on CKD stage 2(HCC)    Heart murmur    GI bleed    Pleural effusion    Chronic venous hypertension (idiopathic) with ulcer of right lower extremity (HCC)    Rash    Stage 2 chronic kidney disease    Hypertensive kidney disease with stage 2 chronic kidney disease    Other proteinuria    Obesity, morbid (HCC)    Follicular lymphoma grade I of lymph nodes of multiple sites (HCC)    Vitamin D deficiency    Stage 3a chronic kidney disease (HCC)    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 (HCC)    Ectatic thoracic aorta (HCC)    Aortic stenosis with trileaflet valve    Sepsis without acute organ dysfunction (HCC)    Acute hypoxic respiratory failure (HCC)    Acute hyponatremia    Chronic diastolic CHF (congestive heart failure) (HCC)    Encounter for deep vein thrombosis (DVT) prophylaxis    Hyponatremia     Past Medical History  Past Medical History:   Diagnosis Date    Arthritis 2010's    Occasionally flares up    Cancer (HCC) May 2021    Still investigating    Chronic kidney disease     Diabetes mellitus (HCC)     Follicular lymphoma (HCC)     GERD (gastroesophageal reflux disease) June 2021    Noticed in an Endoscopy    Heart murmur May 2020    High cholesterol     Hypertension     Kidney stone 1980's    Have had since 1980's    Obesity Since Childhood     Past Surgical History  Past  Surgical History:   Procedure Laterality Date    BUNIONECTOMY Right 11/24/2020    Procedure: RIGHT HAV CORRECTION,;  Surgeon: Niranjan Child DPM;  [facility];  Service: Podiatry    COLONOSCOPY      INCISION AND DRAINAGE OF WOUND Right 10/05/2016    Procedure: INCISION AND DRAINAGE (I&D) EXTREMITY;  Surgeon: Niranjan Child DPM;  [facility];  Service:     IR BIOPSY LYMPH NODE  07/08/2021    IR EMBOLIZATION (SPECIFY VESSEL OR SITE)  07/08/2021    IR PORT PLACEMENT  9/1/2022    PILONIDAL CYST EXCISION      WI CORRECTION HAMMERTOE Right 02/19/2019    Procedure: THIRD HAMMER TOE CORRECTION;  Surgeon: Niranjan Child DPM;  [facility];  Service: Podiatry    WI RMVL MATEO CTR VAD W/SUBQ PORT/ CTR/PRPH INSJ N/A 3/14/2023    Procedure: REMOVAL VENOUS PORT (PORT-A-CATH)IR;  Surgeon: Avelino Vázquez DO;  [facility];  Service: Interventional Radiology    TOE OSTEOTOMY Right 03/14/2017    Procedure: HAMMERTOE CORRECTION R 2 ;  Surgeon: Niranjan Child DPM;  [facility];  Service:     TOE OSTEOTOMY Left 11/24/2020    Procedure: LEFT HT CORRECTION TOE;  Surgeon: Niranjan Child DPM;  [facility];  Service: Podiatry    TONSILLECTOMY  1963      07/30/24 0758   PT Last Visit   PT Visit Date 07/30/24   Note Type   Note type Evaluation   Pain Assessment   Pain Assessment Tool 0-10   Pain Score 8   Pain Location/Orientation Orientation: Right;Location: Neck;Location: Shoulder   Pain Onset/Description Onset: Ongoing   Hospital Pain Intervention(s) Repositioned;Ambulation/increased activity   Restrictions/Precautions   Weight Bearing Precautions Per Order Yes   RLE Weight Bearing Per Order NWB  (per podiatry note on 6/27/24; pt reports he is able to place the heel on the floor which is written in podiatry note from 5/30/24--further clarification is required)   Braces or Orthoses Other (Comment)  (R LE-surgical shoe donned)   Other Precautions Chair Alarm;Bed Alarm;WBS;Fall Risk;Pain   Home  "Living   Type of Home House  (split level)   Home Layout Two level;Able to live on main level with bedroom/bathroom;Performs ADLs on one level;Stairs to enter with rails  (10 LARY; 10 steps to 2nd floor main area)   Bathroom Shower/Tub Walk-in shower   Bathroom Toilet Standard   Bathroom Equipment Other (Comment)  (none per patient)   Bathroom Accessibility Accessible   Home Equipment Crutches   Additional Comments Pt ambulates without an AD, reports he has been using crutches due to his foot wound. Pt reports he sleeps in a recliner.   Prior Function   Level of [place] Independent with functional mobility;Independent with ADLs;Independent with IADLS   Lives With Spouse   Receives Help From Family   IADLs Independent with driving;Independent with meal prep;Independent with medication management   Falls in the last 6 months 0   Vocational Full time employment   General   Family/Caregiver Present Yes  (pt's spouse)   Cognition   Overall Cognitive Status WFL   Arousal/Participation Alert   Orientation Level Oriented X4   Memory Decreased recall of precautions   Following Commands Follows all commands and directions without difficulty   Comments Pt agreeable to PT.   Subjective   Subjective \"I haven't been moving much.\"   RLE Assessment   RLE Assessment X   Strength RLE   RLE Overall Strength 4-/5   LLE Assessment   LLE Assessment X   Strength LLE   LLE Overall Strength 4-/5   Light Touch   RLE Light Touch Impaired   RLE Light Touch Comments neuropathy   LLE Light Touch Impaired   LLE Light Touch Comments neuropathy   Bed Mobility   Additional Comments Pt was received seated in recliner in NAD, /97   Transfers   Sit to Stand 4  Minimal assistance   Additional items Assist x 2;Armrests;Increased time required;Verbal cues   Stand to Sit 4  Minimal assistance   Additional items Assist x 2;Armrests;Increased time required;Verbal cues   Additional Comments Pt educated to maintain NWB on R LE; however pt reporting " that he is able to WB through his heel; further mobility deferred pending clarification of WB status on R LE   Balance   Static Sitting Fair +   Dynamic Sitting Fair   Static Standing Poor +   Endurance Deficit   Endurance Deficit Yes   Endurance Deficit Description decreased activity tolerance   Activity Tolerance   Activity Tolerance Patient limited by fatigue   Medical Staff Made Aware OT Dory  (Co-evaluation performed with OT secondary to complex medical condition of patient and regression of functional status from baseline. PT/OT goals were addressed separately.)   Nurse Made Aware RN aware   Assessment   Prognosis Good   Problem List Decreased strength;Decreased endurance;Impaired balance;Decreased mobility;Impaired sensation;Pain   Assessment Pt is 68 year old male seen for PT evaluation s/p admit to [facility] on 7/28/2024 with Sepsis without acute organ dysfunction (HCC). PT consulted to assess pt's functional mobility and discharge needs. Order placed for PT evaluation and treatment, with up and out of bed as tolerated order. 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 Prior to hospitalization, pt was independent with all functional mobility without an AD. Pt ambulates unrestricted distances on all terrain and elevations. Pt resides with his spouse, in a split level house with ten steps to enter and ten steps to 2nd floor. Personal factors affecting pt at time of initial evaluation include lives in a split level house, stairs to enter home, inability to ambulate household distances, inability to ambulate community distances, inability to navigate level surfaces without external assistance, unable to perform  dynamic tasks in the community, difficulty performing ADLs, and inability to perform IADLs. Please find objective findings from PT assessment regarding body systems outlined above with impairments and limitations including weakness, impaired balance, decreased endurance, pain, decreased activity tolerance, decreased functional mobility tolerance, altered sensation, and fall risk. The following objective measures were performed on initial evaluation Barthel Index: 45/100, Modified [place]: 4 (moderate/severe disability), and AM-PAC 6-Clicks: 13/24. Pt's clinical presentation is currently unstable/unpredictable seen in pt's presentation of need for ongoing medical management/monitoring, pt is a fall risk, and pt requires cues and assist for safety with functional mobility. Pt to benefit from continued PT treatment to address deficits as defined above and maximize pt's level of function and independence with mobility. From a PT standpoint, recommendation at time of discharge would be level 2, moderate resource intensity in order to facilitate return to prior level of function.   Barriers to Discharge Inaccessible home environment;Other (Comment)  (decline in functional mobility)   Goals   STG Expiration Date 08/09/24   Short Term Goal #1 In 10 days: Increase bilateral LE strength 1/2 grade to facilitate independent mobility, Perform all bed mobility tasks modified independent to decrease caregiver burden, Perform all transfers modified independent, while maintaining appropriate WB status on R LE, to improve independence, Increase static sitting, dynamic sitting, and static standing balance 1/2 grade to decrease risk for falls, and PT to see and establish goals for gait, dynamic standing balance, and stairs when appropriate   Plan   Treatment/Interventions Functional transfer training;LE strengthening/ROM;Therapeutic exercise;Endurance training;Patient/family training;Bed mobility;Continued evaluation;Spoke to  nursing;OT;Family   PT Frequency 3-5x/wk   Discharge Recommendation   Rehab Resource Intensity Level, PT II (Moderate Resource Intensity)   AM-PAC Basic Mobility Inpatient   Turning in Flat Bed Without Bedrails 3   Lying on Back to Sitting on Edge of Flat Bed Without Bedrails 3   Moving Bed to Chair 2   Standing Up From Chair Using Arms 2   Walk in Room 2   Climb 3-5 Stairs With Railing 1   Basic Mobility Inpatient Raw Score 13   Basic Mobility Standardized Score 33.99   [facility] Level Of Mobility   [facility] Goal 4: Move to chair/commode   -[facility] Achieved 5: Stand (1 or more minutes)   Modified Martin Scale   Modified Upson Scale 4   Barthel Index   Feeding 10   Bathing 0   Grooming Score 0   Dressing Score 5   Bladder Score 10   Bowels Score 10   Toilet Use Score 5   Transfers (Bed/Chair) Score 5   Mobility (Level Surface) Score 0   Stairs Score 0   Barthel Index Score 45     PT Evaluation Time: [id number]  Tamara Hamilton, PT, DPT

## 2024-07-30 NOTE — ASSESSMENT & PLAN NOTE
"Had a acute hypoxic respiratory failure on admission and needing 2 L of oxygen, patient seems to have significantly improved right now.  Saturations are 96% on room air.  This is likely due to improvement of the sepsis that he came in with    Blood pressure 119/97, pulse 80, temperature 98.8 °F (37.1 °C), resp. rate 17, height 6' 4\" (1.93 m), weight 122 kg (269 lb 13.5 oz), SpO2 96%.    "

## 2024-07-30 NOTE — PLAN OF CARE
Problem: PHYSICAL THERAPY ADULT  Goal: Performs mobility at highest level of function for planned discharge setting.  See evaluation for individualized goals.  Description: Treatment/Interventions: Functional transfer training, LE strengthening/ROM, Therapeutic exercise, Endurance training, Patient/family training, Bed mobility, Continued evaluation, Spoke to nursing, OT, Family          See flowsheet documentation for full assessment, interventions and recommendations.  Note: Prognosis: Good  Problem List: Decreased strength, Decreased endurance, Impaired balance, Decreased mobility, Impaired sensation, Pain  Assessment: Pt is 68 year old male seen for PT evaluation s/p admit to [facility] on 7/28/2024 with Sepsis without acute organ dysfunction (HCC). PT consulted to assess pt's functional mobility and discharge needs. Order placed for PT evaluation and treatment, with up and out of bed as tolerated order. 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 Prior to hospitalization, pt was independent with all functional mobility without an AD. Pt ambulates unrestricted distances on all terrain and elevations. Pt resides with his spouse, in a split level house with ten steps to enter and ten steps to 2nd floor. Personal factors affecting pt at time of initial evaluation include lives in a split level house, stairs to enter home, inability to ambulate household distances, inability to ambulate community distances, inability to navigate level surfaces without external assistance, unable to perform dynamic tasks in the community, difficulty performing ADLs, and inability to perform IADLs. Please find objective findings from  PT assessment regarding body systems outlined above with impairments and limitations including weakness, impaired balance, decreased endurance, pain, decreased activity tolerance, decreased functional mobility tolerance, altered sensation, and fall risk. The following objective measures were performed on initial evaluation Barthel Index: 45/100, Modified [place]: 4 (moderate/severe disability), and AM-PAC 6-Clicks: 13/24. Pt's clinical presentation is currently unstable/unpredictable seen in pt's presentation of need for ongoing medical management/monitoring, pt is a fall risk, and pt requires cues and assist for safety with functional mobility. Pt to benefit from continued PT treatment to address deficits as defined above and maximize pt's level of function and independence with mobility. From a PT standpoint, recommendation at time of discharge would be level 2, moderate resource intensity in order to facilitate return to prior level of function.    Barriers to Discharge: Inaccessible home environment, Other (Comment) (decline in functional mobility)     Rehab Resource Intensity Level, PT: II (Moderate Resource Intensity)    See flowsheet documentation for full assessment.

## 2024-07-31 ENCOUNTER — APPOINTMENT (INPATIENT)
Dept: CT IMAGING | Facility: HOSPITAL | Age: 69
DRG: 871 | End: 2024-07-31
Payer: MEDICARE

## 2024-07-31 ENCOUNTER — APPOINTMENT (INPATIENT)
Dept: RADIOLOGY | Facility: HOSPITAL | Age: 69
DRG: 871 | End: 2024-07-31
Payer: MEDICARE

## 2024-07-31 PROBLEM — M54.2 NECK PAIN: Status: ACTIVE | Noted: 2024-07-31

## 2024-07-31 LAB
ANION GAP SERPL CALCULATED.3IONS-SCNC: 8 MMOL/L (ref 4–13)
BACTERIA BLD CULT: ABNORMAL
BACTERIA BLD CULT: ABNORMAL
BACTERIA WND AEROBE CULT: ABNORMAL
BUN SERPL-MCNC: 30 MG/DL (ref 5–25)
CALCIUM SERPL-MCNC: 9.4 MG/DL (ref 8.4–10.2)
CHLORIDE SERPL-SCNC: 98 MMOL/L (ref 96–108)
CO2 SERPL-SCNC: 29 MMOL/L (ref 21–32)
CREAT SERPL-MCNC: 1.17 MG/DL (ref 0.6–1.3)
ERYTHROCYTE [DISTWIDTH] IN BLOOD BY AUTOMATED COUNT: 17.1 % (ref 11.6–15.1)
GFR SERPL CREATININE-BSD FRML MDRD: 64 ML/MIN/1.73SQ M
GLUCOSE SERPL-MCNC: 137 MG/DL (ref 65–140)
GLUCOSE SERPL-MCNC: 146 MG/DL (ref 65–140)
GLUCOSE SERPL-MCNC: 147 MG/DL (ref 65–140)
GLUCOSE SERPL-MCNC: 148 MG/DL (ref 65–140)
GLUCOSE SERPL-MCNC: 152 MG/DL (ref 65–140)
GRAM STN SPEC: ABNORMAL
HCT VFR BLD AUTO: 35.2 % (ref 36.5–49.3)
HGB BLD-MCNC: 10.8 G/DL (ref 12–17)
MCH RBC QN AUTO: 25.6 PG (ref 26.8–34.3)
MCHC RBC AUTO-ENTMCNC: 30.7 G/DL (ref 31.4–37.4)
MCV RBC AUTO: 83 FL (ref 82–98)
PLATELET # BLD AUTO: 172 THOUSANDS/UL (ref 149–390)
PMV BLD AUTO: 10.4 FL (ref 8.9–12.7)
POTASSIUM SERPL-SCNC: 4.1 MMOL/L (ref 3.5–5.3)
RBC # BLD AUTO: 4.22 MILLION/UL (ref 3.88–5.62)
S AUREUS DNA BLD POS QL NAA+NON-PROBE: DETECTED
SODIUM SERPL-SCNC: 135 MMOL/L (ref 135–147)
VANCOMYCIN SERPL-MCNC: 15.5 UG/ML (ref 10–20)
WBC # BLD AUTO: 10.18 THOUSAND/UL (ref 4.31–10.16)

## 2024-07-31 PROCEDURE — 99233 SBSQ HOSP IP/OBS HIGH 50: CPT | Performed by: INTERNAL MEDICINE

## 2024-07-31 PROCEDURE — 80048 BASIC METABOLIC PNL TOTAL CA: CPT | Performed by: INTERNAL MEDICINE

## 2024-07-31 PROCEDURE — 80202 ASSAY OF VANCOMYCIN: CPT | Performed by: INTERNAL MEDICINE

## 2024-07-31 PROCEDURE — 70360 X-RAY EXAM OF NECK: CPT

## 2024-07-31 PROCEDURE — 85027 COMPLETE CBC AUTOMATED: CPT | Performed by: INTERNAL MEDICINE

## 2024-07-31 PROCEDURE — 82948 REAGENT STRIP/BLOOD GLUCOSE: CPT

## 2024-07-31 PROCEDURE — 70491 CT SOFT TISSUE NECK W/DYE: CPT

## 2024-07-31 RX ORDER — VANCOMYCIN HYDROCHLORIDE 750 MG/150ML
750 INJECTION, SOLUTION INTRAVENOUS EVERY 12 HOURS
Status: DISCONTINUED | OUTPATIENT
Start: 2024-07-31 | End: 2024-07-31

## 2024-07-31 RX ORDER — LIDOCAINE 50 MG/G
1 PATCH TOPICAL DAILY
Status: DISCONTINUED | OUTPATIENT
Start: 2024-07-31 | End: 2024-08-03 | Stop reason: HOSPADM

## 2024-07-31 RX ADMIN — HEPARIN SODIUM 5000 UNITS: 5000 INJECTION INTRAVENOUS; SUBCUTANEOUS at 21:53

## 2024-07-31 RX ADMIN — CEFAZOLIN SODIUM 2000 MG: 2 SOLUTION INTRAVENOUS at 10:18

## 2024-07-31 RX ADMIN — ATORVASTATIN CALCIUM 80 MG: 40 TABLET, FILM COATED ORAL at 17:10

## 2024-07-31 RX ADMIN — OXYCODONE HYDROCHLORIDE 5 MG: 5 TABLET ORAL at 10:18

## 2024-07-31 RX ADMIN — VANCOMYCIN HYDROCHLORIDE 1000 MG: 1 INJECTION, SOLUTION INTRAVENOUS at 04:41

## 2024-07-31 RX ADMIN — HEPARIN SODIUM 5000 UNITS: 5000 INJECTION INTRAVENOUS; SUBCUTANEOUS at 05:20

## 2024-07-31 RX ADMIN — INSULIN LISPRO 2 UNITS: 100 INJECTION, SOLUTION INTRAVENOUS; SUBCUTANEOUS at 11:22

## 2024-07-31 RX ADMIN — ASPIRIN 81 MG: 81 TABLET, CHEWABLE ORAL at 08:22

## 2024-07-31 RX ADMIN — Medication 2 G: at 09:41

## 2024-07-31 RX ADMIN — Medication 2 G: at 17:10

## 2024-07-31 RX ADMIN — IOHEXOL 85 ML: 350 INJECTION, SOLUTION INTRAVENOUS at 18:10

## 2024-07-31 RX ADMIN — OXYCODONE HYDROCHLORIDE 5 MG: 5 TABLET ORAL at 14:20

## 2024-07-31 RX ADMIN — OXYCODONE HYDROCHLORIDE 5 MG: 5 TABLET ORAL at 18:39

## 2024-07-31 RX ADMIN — CEFAZOLIN SODIUM 2000 MG: 2 SOLUTION INTRAVENOUS at 18:42

## 2024-07-31 RX ADMIN — OXYCODONE HYDROCHLORIDE 5 MG: 5 TABLET ORAL at 03:24

## 2024-07-31 RX ADMIN — HEPARIN SODIUM 5000 UNITS: 5000 INJECTION INTRAVENOUS; SUBCUTANEOUS at 14:21

## 2024-07-31 RX ADMIN — LIDOCAINE 1 PATCH: 50 PATCH CUTANEOUS at 09:41

## 2024-07-31 RX ADMIN — CEFAZOLIN SODIUM 2000 MG: 2 SOLUTION INTRAVENOUS at 03:21

## 2024-07-31 NOTE — ASSESSMENT & PLAN NOTE
Lab Results   Component Value Date    SODIUM 135 07/31/2024    SODIUM 134 (L) 07/30/2024    SODIUM 134 (L) 07/29/2024    SODIUM 133 (L) 07/28/2024      Sodium back to normal range  Will monitor electrolytes

## 2024-07-31 NOTE — PLAN OF CARE
Problem: Prexisting or High Potential for Compromised Skin Integrity  Goal: Skin integrity is maintained or improved  Description: INTERVENTIONS:  - Identify patients at risk for skin breakdown  - Assess and monitor skin integrity  - Assess and monitor nutrition and hydration status  - Monitor labs   - Assess for incontinence   - Turn and reposition patient  - Assist with mobility/ambulation  - Relieve pressure over bony prominences  - Avoid friction and shearing  - Provide appropriate hygiene as needed including keeping skin clean and dry  - Evaluate need for skin moisturizer/barrier cream  - Collaborate with interdisciplinary team   - Patient/family teaching  - Consider wound care consult   Outcome: Progressing     Problem: INFECTION - ADULT  Goal: Absence or prevention of progression during hospitalization  Description: INTERVENTIONS:  - Assess and monitor for signs and symptoms of infection  - Monitor lab/diagnostic results  - Monitor all insertion sites, i.e. indwelling lines, tubes, and drains  - Monitor endotracheal if appropriate and nasal secretions for changes in amount and color  - [place] appropriate cooling/warming therapies per order  - Administer medications as ordered  - Instruct and encourage patient and family to use good hand hygiene technique  - Identify and instruct in appropriate isolation precautions for identified infection/condition  Outcome: Progressing      98

## 2024-07-31 NOTE — ASSESSMENT & PLAN NOTE
"Had a acute hypoxic respiratory failure on admission and needing 2 L of oxygen, patient seems to have significantly improved right now.  This is likely due to improvement of the sepsis and discontinue opioid medications.    Blood pressure 120/71, pulse 90, temperature 98.2 °F (36.8 °C), resp. rate 19, height 6' 4\" (1.93 m), weight 122 kg (269 lb 13.5 oz), SpO2 97%.    "

## 2024-07-31 NOTE — ASSESSMENT & PLAN NOTE
Patient has a past medical history of follicular B-cell lymphoma (diagnosed in 2020 by [facility] Dr. Naomi Cumimngs, [facility]), s/p chemotherapy x 6 months, ended in Feburary 2022, now in clinical remission.  Observe during the hospitalization

## 2024-07-31 NOTE — PLAN OF CARE
Problem: Prexisting or High Potential for Compromised Skin Integrity  Goal: Skin integrity is maintained or improved  Description: INTERVENTIONS:  - Identify patients at risk for skin breakdown  - Assess and monitor skin integrity  - Assess and monitor nutrition and hydration status  - Monitor labs   - Assess for incontinence   - Turn and reposition patient  - Assist with mobility/ambulation  - Relieve pressure over bony prominences  - Avoid friction and shearing  - Provide appropriate hygiene as needed including keeping skin clean and dry  - Evaluate need for skin moisturizer/barrier cream  - Collaborate with interdisciplinary team   - Patient/family teaching  - Consider wound care consult   7/31/2024 0004 by Niesha Segura  Outcome: Progressing  7/31/2024 0002 by Niesha Segura  Outcome: Progressing     Problem: PAIN - ADULT  Goal: Verbalizes/displays adequate comfort level or baseline comfort level  Description: Interventions:  - Encourage patient to monitor pain and request assistance  - Assess pain using appropriate pain scale  - Administer analgesics based on type and severity of pain and evaluate response  - Implement non-pharmacological measures as appropriate and evaluate response  - Consider cultural and social influences on pain and pain management  - Notify physician/advanced practitioner if interventions unsuccessful or patient reports new pain  7/31/2024 0004 by Niesha Segura  Outcome: Progressing  7/31/2024 0002 by Niesha Segura  Outcome: Progressing     Problem: INFECTION - ADULT  Goal: Absence or prevention of progression during hospitalization  Description: INTERVENTIONS:  - Assess and monitor for signs and symptoms of infection  - Monitor lab/diagnostic results  - Monitor all insertion sites, i.e. indwelling lines, tubes, and drains  - Monitor endotracheal if appropriate and nasal secretions for changes in amount and color  - [place] appropriate cooling/warming therapies per order  -  Administer medications as ordered  - Instruct and encourage patient and family to use good hand hygiene technique  - Identify and instruct in appropriate isolation precautions for identified infection/condition  7/31/2024 0004 by Niesha Segura  Outcome: Progressing  7/31/2024 0002 by Niesha Segura  Outcome: Progressing

## 2024-07-31 NOTE — ASSESSMENT & PLAN NOTE
Lab Results   Component Value Date    HGBA1C 6.8 (H) 07/05/2024       Recent Labs     07/30/24  1049 07/30/24  1618 07/30/24  2103 07/31/24  0634   POCGLU 171* 168* 157* 147*         Blood Sugar Average: Last 72 hrs:  (P) [id number]    Continue carbohydrate consistent diet, lispro insulin sliding scale qac + qhs, and POC glucose qac + qhs

## 2024-07-31 NOTE — PROGRESS NOTES
Augustus Coffman is a 68 y.o. male who is currently ordered Vancomycin IV with management by the Pharmacy Consult service.  Relevant clinical data and objective / subjective history reviewed.  Vancomycin Assessment:  Indication and Goal AUC/Trough: Pneumonia (goal -600, trough >10); Soft tissue (goal -600, trough >10), -600, trough >10  Clinical Status: stable  Micro:     Renal Function:  SCr: 1.17 mg/dL  CrCl: 86.4 mL/min  Renal replacement: Not on dialysis  Days of Therapy: 3  Current Dose: 1000mg IV q12h  Vancomycin Plan:  New Dosinmg IV q12h  Estimated AUC: 421 mcg*hr/mL  Estimated Trough: 13.8 mcg/mL  Next Level: 8//24 AM level   Renal Function Monitoring: Daily BMP and UOP  Pharmacy will continue to follow closely for s/sx of nephrotoxicity, infusion reactions and appropriateness of therapy.  BMP and CBC will be ordered per protocol. We will continue to follow the patient’s culture results and clinical progress daily.    Lakeisha Santiago, Pharmacist

## 2024-07-31 NOTE — ASSESSMENT & PLAN NOTE
Wt Readings from Last 3 Encounters:   07/31/24 122 kg (269 lb 13.5 oz)   07/25/24 121 kg (267 lb 10.2 oz)   07/17/24 121 kg (266 lb 6.4 oz)       Was given fluids per sepsis protocol and has improved.  No evidence of volume overload  Per 07/05/2024 TTE: LV EF 62%, grade I LV  diastolic dysfunction.  RV systolic function normal.

## 2024-07-31 NOTE — QUICK NOTE
"X-ray neck showed - \"the epiglottis appears questionably thickened. Depending on level of concern for epiglottitis, consider contrast-enhanced CT of the neck for further evaluation. \"  "

## 2024-07-31 NOTE — PROGRESS NOTES
[facility]  Progress Note  Name: Augustus Coffman I  MRN: [id number]  Unit/Bed#: -01 I Date of Admission: 7/28/2024   Date of Service: 7/31/2024 I Hospital Day: 2    Assessment & Plan   Neck pain  Assessment & Plan  Patient has been complaining of severe neck and shoulder pain.  Neck pain is usually 9/10 in intensity that decreased to 7/10 in intensity after taking oxycodone 5 mg.  Had tried morphine and Dilaudid that did not work for him.  X-ray neck obtained this morning.  Pending final report.  Pain medicines: Tylenol, Flexeril, morphine as needed.  Oxycodone 2.5 mg every 4 as needed for moderate pain, oxycodone 5 mg every 4 as needed for severe pain, Dilaudid for breakthrough pain.  Lidocaine patch and ice application to the affected area.    Encounter for deep vein thrombosis (DVT) prophylaxis  Assessment & Plan  Start pharmacologic DVT prophylaxis utilizing heparin 5000 units SQ every 8 hours (07/29/2024, 6 AM).  Of note, patient has no complaints of calf pain, leg swelling, or pleurisy, to suggest either DVT or PE on admission date 07/28/2024    Chronic diastolic CHF (congestive heart failure) (HCC)  Assessment & Plan  Wt Readings from Last 3 Encounters:   07/31/24 122 kg (269 lb 13.5 oz)   07/25/24 121 kg (267 lb 10.2 oz)   07/17/24 121 kg (266 lb 6.4 oz)       Was given fluids per sepsis protocol and has improved.  No evidence of volume overload  Per 07/05/2024 TTE: LV EF 62%, grade I LV  diastolic dysfunction.  RV systolic function normal.        Acute hyponatremia  Assessment & Plan  Lab Results   Component Value Date    SODIUM 135 07/31/2024    SODIUM 134 (L) 07/30/2024    SODIUM 134 (L) 07/29/2024    SODIUM 133 (L) 07/28/2024      Sodium back to normal range  Will monitor electrolytes    Acute hypoxic respiratory failure (HCC)  Assessment & Plan  Had a acute hypoxic respiratory failure on admission and needing 2 L of oxygen, patient seems to have significantly improved  "right now.  This is likely due to improvement of the sepsis and discontinue opioid medications.    Blood pressure 120/71, pulse 90, temperature 98.2 °F (36.8 °C), resp. rate 19, height 6' 4\" (1.93 m), weight 122 kg (269 lb 13.5 oz), SpO2 97%.      Aortic stenosis with trileaflet valve  Assessment & Plan  Patient has no complaints of angina, syncope, or acute-on-chronic diastolic CHF on admitting date 07/28/2024.  Moderate aortic stenosis present.  Will need outpatient follow-up with cardiology        Follicular lymphoma grade I of lymph nodes of multiple sites (HCC)  Assessment & Plan  Patient has a past medical history of follicular B-cell lymphoma (diagnosed in 2020 by [facility] Dr. Naomi Cummings, [facility]), s/p chemotherapy x 6 months, ended in Feburary 2022, now in clinical remission.  Observe during the hospitalization    FINA (acute kidney injury) on CKD stage 2([facility])  Assessment & Plan  Lab Results   Component Value Date    CREATININE 1.17 07/31/2024    CREATININE 1.20 07/30/2024    CREATININE 1.52 (H) 07/29/2024    CREATININE 1.18 07/28/2024      Most likely prerenal vs ATN.   FINA resolved.  Monitor kidney function    Hypercholesteremia  Assessment & Plan  Patient restarted on statins, Lipitor 80 mg as a substitute for Crestor 40 mg daily that he takes at home    Diabetes 1.5, managed as type 2 ([facility])  Assessment & Plan  Lab Results   Component Value Date    HGBA1C 6.8 (H) 07/05/2024       Recent Labs     07/30/24  1049 07/30/24  1618 07/30/24  2103 07/31/24  0634   POCGLU 171* 168* 157* 147*         Blood Sugar Average: Last 72 hrs:  (P) [id number]    Continue carbohydrate consistent diet, lispro insulin sliding scale qac + qhs, and POC glucose qac + qhs    * Sepsis without acute organ dysfunction (HCC)  Assessment & Plan  Patient is afebrile at 100.2 °F (07/28/2024, 9:44 PM), but I note that patient also carries a diagnosis of follicular B-cell lymphoma (diagnosed in " 2020 by [facility] Dr. Naomi Cummings, [facility]), s/p chemotherapy x 6 months, ended in Feburary 2022, now in clinical remission; hence, patient is immunosuppressed and may be incapable of mounting a paula mayito fever of 100.4 °F or greater.    In addition, patient has an admitting heart rate of 135 bpm and a respiratory rate of 20 breaths/min, saturating 95% on room air (07/28/2024, 9:44 PM) with repeat O2 saturation 88% on room air (07/28/2024, 10:30 PM), and repeat O2 saturation 95% on 4 liters/minute O2 via NC (07/28/2024, 10:31pm).   Exam was noted for a chest that was clear to auscultation and nontender to palpation.  In addition, patient's right sole was noted for an open 1 cm ulcer at the ball of patient's right sole and which was negative for erythema (cf., the patient's entire right foot and right lower shin were actually dark brown/cyanotic), edema, induration, warmth (cf., the patient's entire right foot and right lower shin were actually cold to touch), tenderness, crepitus, fluctuance, serous/sanguinous/suppurative discharge, malodor, and lymphangitic streaking. Patient right foot wound growing Staphylococcus aureus.    Plans:  Continue antibiotic intravenous Ancef   Blood culture positive for MSSA.    Will DC vancomycin.    Patient was hypotensive and was given fluids per sepsis protocol by me yesterday and his vital signs improved.  Blood pressure and WBC count both improved on 7/31/2024               VTE Pharmacologic Prophylaxis: VTE Score: 3 Moderate Risk (Score 3-4) - Pharmacological DVT Prophylaxis Ordered: heparin.    Mobility:   Basic Mobility Inpatient Raw Score: 17  JH-HLM Goal: 5: Stand one or more mins  JH-HLM Achieved: 7: Walk 25 feet or more  JH-HLM Goal achieved. Continue to encourage appropriate mobility.    Patient Centered Rounds: I performed bedside rounds with nursing staff today.   Discussions with Specialists or Other Care Team Provider:     Education  and Discussions with Family / Patient: Updated  (wife) via phone.  Left voicemail.    Total Time Spent on Date of Encounter in care of patient: 30 mins. This time was spent on one or more of the following: performing physical exam; counseling and coordination of care; obtaining or reviewing history; documenting in the medical record; reviewing/ordering tests, medications or procedures; communicating with other healthcare professionals and discussing with patient's family/caregivers.    Current Length of Stay: 2 day(s)  Current Patient Status: Inpatient   Certification Statement: The patient will continue to require additional inpatient hospital stay due to sepsis  Discharge Plan: Anticipate discharge in 24-48 hrs to home.    Code Status: Level 1 - Full Code    Subjective:   Pt seen and examined at bedside this morning. He was sitting in the recliner chair and said that he needs pain meds for severe neck pain last night. Oxycodone 5 mg helped his pain. Denied tingling/numbness at the fingers. Agreeable to check Xray sift tissue neck. Pt wants to wait natural bowel movement today since he didn't eat much yesterday.     Objective:     Vitals:   Temp (24hrs), Av.1 °F (36.7 °C), Min:97.6 °F (36.4 °C), Max:98.8 °F (37.1 °C)    Temp:  [97.6 °F (36.4 °C)-98.8 °F (37.1 °C)] 97.6 °F (36.4 °C)  HR:  [] 89  Resp:  [13-19] 18  BP: ()/(60-77) 114/68  SpO2:  [95 %-98 %] 95 %  Body mass index is 32.85 kg/m².     Input and Output Summary (last 24 hours):     Intake/Output Summary (Last 24 hours) at 2024 1507  Last data filed at 2024 0501  Gross per 24 hour   Intake 360 ml   Output --   Net 360 ml       Physical Exam:   Physical Exam  Vitals and nursing note reviewed.   Constitutional:       General: He is not in acute distress.     Appearance: He is well-developed. He is obese. He is ill-appearing.   HENT:      Head: Normocephalic and atraumatic.      Mouth/Throat:      Mouth: Mucous membranes  are moist.      Pharynx: Oropharynx is clear.   Eyes:      Extraocular Movements: Extraocular movements intact.      Conjunctiva/sclera: Conjunctivae normal.      Pupils: Pupils are equal, round, and reactive to light.   Neck:      Comments: Neck pain and tenderness    Cardiovascular:      Rate and Rhythm: Normal rate and regular rhythm.      Heart sounds: Normal heart sounds. No murmur heard.     No gallop.   Pulmonary:      Effort: Pulmonary effort is normal. No respiratory distress.      Breath sounds: Normal breath sounds. No wheezing or rales.   Abdominal:      General: There is no distension.      Palpations: Abdomen is soft.      Tenderness: There is no abdominal tenderness.   Musculoskeletal:         General: No swelling.      Cervical back: Neck supple. Tenderness present.      Right lower leg: No edema.   Skin:     General: Skin is warm and dry.      Capillary Refill: Capillary refill takes less than 2 seconds.      Findings: Lesion present.      Comments: Rt foot wound   Neurological:      Mental Status: He is alert and oriented to person, place, and time.   Psychiatric:         Mood and Affect: Mood normal.          Additional Data:     Labs:  Results from last 7 days   Lab Units 07/31/24 0445 07/29/24 0412 07/28/24 2218   WBC Thousand/uL 10.18*   < > 28.63*   HEMOGLOBIN g/dL 10.8*   < > 13.6   HEMATOCRIT % 35.2*   < > 43.9   PLATELETS Thousands/uL 172   < > 250   BANDS PCT %  --   --  3   LYMPHO PCT %  --   --  2*   MONO PCT %  --   --  5   EOS PCT %  --   --  0    < > = values in this interval not displayed.     Results from last 7 days   Lab Units 07/31/24 0445 07/29/24 0415 07/28/24 2218   SODIUM mmol/L 135   < > 133*   POTASSIUM mmol/L 4.1   < > 4.4   CHLORIDE mmol/L 98   < > 96   CO2 mmol/L 29   < > 21   BUN mg/dL 30*   < > 27*   CREATININE mg/dL 1.17   < > 1.18   ANION GAP mmol/L 8   < > 16*   CALCIUM mg/dL 9.4   < > 9.9   ALBUMIN g/dL  --   --  3.9   TOTAL BILIRUBIN mg/dL  --   --  0.66    ALK PHOS U/L  --   --  92   ALT U/L  --   --  43   AST U/L  --   --  29   GLUCOSE RANDOM mg/dL 148*   < > 224*    < > = values in this interval not displayed.         Results from last 7 days   Lab Units 07/31/24  1115 07/31/24  0634 07/30/24  2103 07/30/24  1618 07/30/24  1049 07/30/24  0638 07/30/24  0116 07/29/24  2334 07/29/24  2125 07/29/24  1821 07/29/24  1722 07/29/24  1601   POC GLUCOSE mg/dl 152* 147* 157* 168* 171* 165* 180* 159* 147* 261* 274* 236*         Results from last 7 days   Lab Units 07/30/24  0507 07/29/24  2144 07/29/24  1805 07/29/24  0416 07/28/24  2245 07/28/24  2218   LACTIC ACID mmol/L  --  1.4 2.1* 1.0 1.3  --    PROCALCITONIN ng/ml 2.81*  --  3.81* 2.95*  --  0.59*       Lines/Drains:  Invasive Devices       Peripheral Intravenous Line  Duration             Peripheral IV 07/28/24 Right Antecubital 2 days                          Imaging: Reviewed radiology reports from this admission including: xray(s)  XR foot 3+ views RIGHT   Final Result by Tanesha Cartagena MD (07/29 1033)      No acute osseous abnormality.   Chronic findings, as above.         Computerized Assisted Algorithm (CAA) may have been used to analyze all applicable images.         Workstation performed: [id number]         CTA ED chest PE Study   Final Result by Gabriel Woods MD (07/28 2338)      1.  No acute pulmonary embolism to the segmental level with evaluation of the more peripheral subsegmental branches limited by timing of contrast bolus and inadequate contrast opacification.   2.  Ectatic ascending thoracic aorta again noted measuring up to 4.6 cm in diameter, not significantly changed. No aortic dissection.   3.  Diffuse wall thickening of the left ventricle including interventricular septum noted suggesting left ventricular hypertrophy.   4.  No lobar airspace consolidation/pneumonia. No pleural or pericardial effusions.      Workstation performed: [id number]         XR chest 2 views   Final Result by Angela  Kelly Montes De Oca MD (07/28 2300)      No acute cardiopulmonary disease.            Workstation performed: [id number]         XR neck soft tissue    (Results Pending)      Recent Cultures (last 7 days):   Results from last 7 days   Lab Units 07/29/24  0846 07/28/24  2339   BLOOD CULTURE   --  Staphylococcus aureus*  Staphylococcus aureus*   GRAM STAIN RESULT  No Polys or Bacteria seen Gram positive cocci in clusters*  Gram positive cocci in clusters*   WOUND CULTURE  3+ Growth of Staphylococcus aureus*  --        Last 24 Hours Medication List:   Current Facility-Administered Medications   Medication Dose Route Frequency Provider Last Rate    acetaminophen  650 mg Oral Q6H PRN Irineo Pedersen MD      amLODIPine  10 mg Oral Daily Irineo Pedersen MD      aspirin  81 mg Oral Daily Irineo Pedersen MD      atorvastatin  80 mg Oral Daily With Dinner Jessee Batres MD      cefazolin  2,000 mg Intravenous Q8H May Kalyn Reyes MD 2,000 mg (07/31/24 1018)    cyclobenzaprine  10 mg Oral TID PRN Irineo Pedersen MD      Diclofenac Sodium  2 g Topical 4x Daily May Kalyn Reyes MD      heparin (porcine)  5,000 Units Subcutaneous Q8H Irineo Pedersen MD      HYDROmorphone  0.2 mg Intravenous Q2H PRN May Kalyn Reyes MD      insulin lispro  2-12 Units Subcutaneous TID AC May Kalyn Reyes MD      insulin lispro  2-12 Units Subcutaneous HS May Kalyn Reyes MD      lidocaine  1 patch Topical Daily May Kalyn Reyes MD      losartan  100 mg Oral Daily Irineo Pedersen MD      metoprolol succinate  100 mg Oral QPM Irineo Pedersen MD      morphine injection  4 mg Intravenous Q4H PRN May Kalyn Reyes MD      nitroglycerin  0.4 mg Sublingual Q5 Min PRN Irineo Pedersen MD      oxyCODONE  2.5 mg Oral Q4H PRN May Kalyn Reyes MD      Or    oxyCODONE  5 mg Oral Q4H PRN May Kalyn Reyes MD          Today, Patient Was Seen By: Fernanda Reyes MD    **Please Note: This note may have been constructed using a voice recognition system.**

## 2024-07-31 NOTE — ASSESSMENT & PLAN NOTE
Patient has been complaining of severe neck and shoulder pain.  Neck pain is usually 9/10 in intensity that decreased to 7/10 in intensity after taking oxycodone 5 mg.  Had tried morphine and Dilaudid that did not work for him.  X-ray neck obtained this morning.  Pending final report.  Pain medicines: Tylenol, Flexeril, morphine as needed.  Oxycodone 2.5 mg every 4 as needed for moderate pain, oxycodone 5 mg every 4 as needed for severe pain, Dilaudid for breakthrough pain.  Lidocaine patch and ice application to the affected area.

## 2024-07-31 NOTE — ASSESSMENT & PLAN NOTE
Lab Results   Component Value Date    CREATININE 1.17 07/31/2024    CREATININE 1.20 07/30/2024    CREATININE 1.52 (H) 07/29/2024    CREATININE 1.18 07/28/2024      Most likely prerenal vs ATN.   FINA resolved.  Monitor kidney function

## 2024-07-31 NOTE — ASSESSMENT & PLAN NOTE
Patient is afebrile at 100.2 °F (07/28/2024, 9:44 PM), but I note that patient also carries a diagnosis of follicular B-cell lymphoma (diagnosed in 2020 by [facility] Dr. Naomi Cummings, [facility]), s/p chemotherapy x 6 months, ended in Feburary 2022, now in clinical remission; hence, patient is immunosuppressed and may be incapable of mounting a paula mayito fever of 100.4 °F or greater.    In addition, patient has an admitting heart rate of 135 bpm and a respiratory rate of 20 breaths/min, saturating 95% on room air (07/28/2024, 9:44 PM) with repeat O2 saturation 88% on room air (07/28/2024, 10:30 PM), and repeat O2 saturation 95% on 4 liters/minute O2 via NC (07/28/2024, 10:31pm).   Exam was noted for a chest that was clear to auscultation and nontender to palpation.  In addition, patient's right sole was noted for an open 1 cm ulcer at the ball of patient's right sole and which was negative for erythema (cf., the patient's entire right foot and right lower shin were actually dark brown/cyanotic), edema, induration, warmth (cf., the patient's entire right foot and right lower shin were actually cold to touch), tenderness, crepitus, fluctuance, serous/sanguinous/suppurative discharge, malodor, and lymphangitic streaking. Patient right foot wound growing Staphylococcus aureus.    Plans:  Continue antibiotic intravenous Ancef   Blood culture positive for MSSA.    Will DC vancomycin.    Patient was hypotensive and was given fluids per sepsis protocol by me yesterday and his vital signs improved.  Blood pressure and WBC count both improved on 7/31/2024

## 2024-07-31 NOTE — PLAN OF CARE
Problem: INFECTION - ADULT  Goal: Absence or prevention of progression during hospitalization  Description: INTERVENTIONS:  - Assess and monitor for signs and symptoms of infection  - Monitor lab/diagnostic results  - Monitor all insertion sites, i.e. indwelling lines, tubes, and drains  - Monitor endotracheal if appropriate and nasal secretions for changes in amount and color  - [place] appropriate cooling/warming therapies per order  - Administer medications as ordered  - Instruct and encourage patient and family to use good hand hygiene technique  - Identify and instruct in appropriate isolation precautions for identified infection/condition  Outcome: Progressing     Problem: PAIN - ADULT  Goal: Verbalizes/displays adequate comfort level or baseline comfort level  Description: Interventions:  - Encourage patient to monitor pain and request assistance  - Assess pain using appropriate pain scale  - Administer analgesics based on type and severity of pain and evaluate response  - Implement non-pharmacological measures as appropriate and evaluate response  - Consider cultural and social influences on pain and pain management  - Notify physician/advanced practitioner if interventions unsuccessful or patient reports new pain  Outcome: Progressing

## 2024-08-01 LAB
ANION GAP SERPL CALCULATED.3IONS-SCNC: 10 MMOL/L (ref 4–13)
ANION GAP SERPL CALCULATED.3IONS-SCNC: 9 MMOL/L (ref 4–13)
BUN SERPL-MCNC: 25 MG/DL (ref 5–25)
BUN SERPL-MCNC: 27 MG/DL (ref 5–25)
CALCIUM SERPL-MCNC: 9.4 MG/DL (ref 8.4–10.2)
CALCIUM SERPL-MCNC: 9.5 MG/DL (ref 8.4–10.2)
CHLORIDE SERPL-SCNC: 99 MMOL/L (ref 96–108)
CHLORIDE SERPL-SCNC: 99 MMOL/L (ref 96–108)
CO2 SERPL-SCNC: 25 MMOL/L (ref 21–32)
CO2 SERPL-SCNC: 28 MMOL/L (ref 21–32)
CREAT SERPL-MCNC: 0.97 MG/DL (ref 0.6–1.3)
CREAT SERPL-MCNC: 1.11 MG/DL (ref 0.6–1.3)
ERYTHROCYTE [DISTWIDTH] IN BLOOD BY AUTOMATED COUNT: 16.8 % (ref 11.6–15.1)
GFR SERPL CREATININE-BSD FRML MDRD: 68 ML/MIN/1.73SQ M
GFR SERPL CREATININE-BSD FRML MDRD: 80 ML/MIN/1.73SQ M
GLUCOSE SERPL-MCNC: 130 MG/DL (ref 65–140)
GLUCOSE SERPL-MCNC: 142 MG/DL (ref 65–140)
GLUCOSE SERPL-MCNC: 146 MG/DL (ref 65–140)
GLUCOSE SERPL-MCNC: 161 MG/DL (ref 65–140)
GLUCOSE SERPL-MCNC: 162 MG/DL (ref 65–140)
GLUCOSE SERPL-MCNC: 205 MG/DL (ref 65–140)
HCT VFR BLD AUTO: 33 % (ref 36.5–49.3)
HGB BLD-MCNC: 10.6 G/DL (ref 12–17)
MCH RBC QN AUTO: 26 PG (ref 26.8–34.3)
MCHC RBC AUTO-ENTMCNC: 32.1 G/DL (ref 31.4–37.4)
MCV RBC AUTO: 81 FL (ref 82–98)
PLATELET # BLD AUTO: 182 THOUSANDS/UL (ref 149–390)
PMV BLD AUTO: 10 FL (ref 8.9–12.7)
POTASSIUM SERPL-SCNC: 4 MMOL/L (ref 3.5–5.3)
POTASSIUM SERPL-SCNC: 4.1 MMOL/L (ref 3.5–5.3)
RBC # BLD AUTO: 4.08 MILLION/UL (ref 3.88–5.62)
SODIUM SERPL-SCNC: 134 MMOL/L (ref 135–147)
SODIUM SERPL-SCNC: 136 MMOL/L (ref 135–147)
WBC # BLD AUTO: 6.89 THOUSAND/UL (ref 4.31–10.16)

## 2024-08-01 PROCEDURE — 99232 SBSQ HOSP IP/OBS MODERATE 35: CPT | Performed by: INTERNAL MEDICINE

## 2024-08-01 PROCEDURE — 99239 HOSP IP/OBS DSCHRG MGMT >30: CPT | Performed by: STUDENT IN AN ORGANIZED HEALTH CARE EDUCATION/TRAINING PROGRAM

## 2024-08-01 PROCEDURE — 80048 BASIC METABOLIC PNL TOTAL CA: CPT | Performed by: INTERNAL MEDICINE

## 2024-08-01 PROCEDURE — 80048 BASIC METABOLIC PNL TOTAL CA: CPT

## 2024-08-01 PROCEDURE — 85027 COMPLETE CBC AUTOMATED: CPT | Performed by: INTERNAL MEDICINE

## 2024-08-01 PROCEDURE — 82948 REAGENT STRIP/BLOOD GLUCOSE: CPT

## 2024-08-01 RX ORDER — POLYETHYLENE GLYCOL 3350 17 G/17G
17 POWDER, FOR SOLUTION ORAL DAILY
Status: DISCONTINUED | OUTPATIENT
Start: 2024-08-01 | End: 2024-08-03 | Stop reason: HOSPADM

## 2024-08-01 RX ORDER — ACETAMINOPHEN 325 MG/1
975 TABLET ORAL EVERY 8 HOURS SCHEDULED
Status: DISCONTINUED | OUTPATIENT
Start: 2024-08-01 | End: 2024-08-02

## 2024-08-01 RX ORDER — SENNOSIDES 8.6 MG
1 TABLET ORAL
Status: DISCONTINUED | OUTPATIENT
Start: 2024-08-01 | End: 2024-08-03 | Stop reason: HOSPADM

## 2024-08-01 RX ADMIN — INSULIN LISPRO 2 UNITS: 100 INJECTION, SOLUTION INTRAVENOUS; SUBCUTANEOUS at 16:36

## 2024-08-01 RX ADMIN — LIDOCAINE 1 PATCH: 50 PATCH CUTANEOUS at 08:37

## 2024-08-01 RX ADMIN — Medication 2 G: at 22:16

## 2024-08-01 RX ADMIN — POLYETHYLENE GLYCOL 3350 17 G: 17 POWDER, FOR SOLUTION ORAL at 08:38

## 2024-08-01 RX ADMIN — ACETAMINOPHEN 975 MG: 325 TABLET, FILM COATED ORAL at 22:16

## 2024-08-01 RX ADMIN — CEFAZOLIN SODIUM 2000 MG: 2 SOLUTION INTRAVENOUS at 12:10

## 2024-08-01 RX ADMIN — OXYCODONE HYDROCHLORIDE 5 MG: 5 TABLET ORAL at 00:25

## 2024-08-01 RX ADMIN — ACETAMINOPHEN 975 MG: 325 TABLET, FILM COATED ORAL at 16:36

## 2024-08-01 RX ADMIN — HEPARIN SODIUM 5000 UNITS: 5000 INJECTION INTRAVENOUS; SUBCUTANEOUS at 05:00

## 2024-08-01 RX ADMIN — Medication 2 G: at 08:39

## 2024-08-01 RX ADMIN — SENNOSIDES 8.6 MG: 8.6 TABLET, FILM COATED ORAL at 22:16

## 2024-08-01 RX ADMIN — ACETAMINOPHEN 975 MG: 325 TABLET, FILM COATED ORAL at 08:38

## 2024-08-01 RX ADMIN — HEPARIN SODIUM 5000 UNITS: 5000 INJECTION INTRAVENOUS; SUBCUTANEOUS at 22:16

## 2024-08-01 RX ADMIN — CEFAZOLIN SODIUM 2000 MG: 2 SOLUTION INTRAVENOUS at 19:23

## 2024-08-01 RX ADMIN — Medication 2 G: at 18:19

## 2024-08-01 RX ADMIN — INSULIN LISPRO 2 UNITS: 100 INJECTION, SOLUTION INTRAVENOUS; SUBCUTANEOUS at 08:37

## 2024-08-01 RX ADMIN — CYCLOBENZAPRINE HYDROCHLORIDE 10 MG: 10 TABLET, FILM COATED ORAL at 08:43

## 2024-08-01 RX ADMIN — HEPARIN SODIUM 5000 UNITS: 5000 INJECTION INTRAVENOUS; SUBCUTANEOUS at 13:51

## 2024-08-01 RX ADMIN — ATORVASTATIN CALCIUM 80 MG: 40 TABLET, FILM COATED ORAL at 16:36

## 2024-08-01 RX ADMIN — ASPIRIN 81 MG: 81 TABLET, CHEWABLE ORAL at 08:38

## 2024-08-01 RX ADMIN — CEFAZOLIN SODIUM 2000 MG: 2 SOLUTION INTRAVENOUS at 02:21

## 2024-08-01 RX ADMIN — OXYCODONE HYDROCHLORIDE 5 MG: 5 TABLET ORAL at 04:57

## 2024-08-01 RX ADMIN — CYCLOBENZAPRINE HYDROCHLORIDE 10 MG: 10 TABLET, FILM COATED ORAL at 16:36

## 2024-08-01 NOTE — ASSESSMENT & PLAN NOTE
Patient is afebrile at 100.2 °F (07/28/2024, 9:44 PM), but I note that patient also carries a diagnosis of follicular B-cell lymphoma (diagnosed in 2020 by [facility] Dr. Naomi Cummings, [facility]), s/p chemotherapy x 6 months, ended in Feburary 2022, now in clinical remission; hence, patient is immunosuppressed and may be incapable of mounting a paula mayito fever of 100.4 °F or greater.    In addition, patient has an admitting heart rate of 135 bpm and a respiratory rate of 20 breaths/min, saturating 95% on room air (07/28/2024, 9:44 PM) with repeat O2 saturation 88% on room air (07/28/2024, 10:30 PM), and repeat O2 saturation 95% on 4 liters/minute O2 via NC (07/28/2024, 10:31pm).   Exam was noted for a chest that was clear to auscultation and nontender to palpation.  In addition, patient's right sole was noted for an open 1 cm ulcer at the ball of patient's right sole and which was negative for erythema (cf., the patient's entire right foot and right lower shin were actually dark brown/cyanotic), edema, induration, warmth (cf., the patient's entire right foot and right lower shin were actually cold to touch), tenderness, crepitus, fluctuance, serous/sanguinous/suppurative discharge, malodor, and lymphangitic streaking. Patient right foot wound growing Staphylococcus aureus.    Plans:  Continue antibiotic intravenous Ancef   Blood culture positive for MSSA.    DC'ed vancomycin.    Switch oral antibiotic upon discharge.  Patient was hypotensive and was given fluids per sepsis protocol by me yesterday and his vital signs improved.  Blood pressure and WBC count both improved.

## 2024-08-01 NOTE — ED PROVIDER NOTES
History  Chief Complaint   Patient presents with    Shoulder Pain     Pt reports R shoulder pain x 3 weeks. Pt states was seen here previously for same. Pt states pain is not improved. Denies injury / heavy lifting.      Patient is a 68-year-old male presenting emergency department with right shoulder pain x 3 weeks.  Patient states that he has been seen here previously for the same complaint.  Patient states that the pain is not improving.  Patient denies any injury or heavy lifting with the shoulder.  Patient states that the pain is a sharp pain that radiates on the right arm with intermittent numbness.  Patient states last time he was here patient was given a muscle relaxer and some other type of medication that helped with the patient's pain.  Since last visit patient has not followed up with PT or orthopedist.  Patient states that pain has not worsened and would like something for pain.  Patient denies any fever, body aches, chills, chest pain, palpitations, shortness of breath, chest tightness, or any N/V/D.        Prior to Admission Medications   Prescriptions Last Dose Informant Patient Reported? Taking?   Cholecalciferol 100 MCG (4000 UT) CAPS  Self No No   Sig: Take 1 capsule (4,000 Units total) by mouth in the morning   Diclofenac Sodium (VOLTAREN) 1 %   No No   Sig: Apply 2 g topically 4 (four) times a day   Empagliflozin (Jardiance) 25 MG TABS  Self No No   Sig: TAKE 1 TABLET BY MOUTH EVERY DAY IN THE MORNING   Multiple Vitamins-Minerals (Centrum Silver 50+Men) TABS  Self Yes No   allopurinol (ZYLOPRIM) 300 mg tablet  Self No No   Sig: Take 1 tablet (300 mg total) by mouth daily   amLODIPine (NORVASC) 10 mg tablet  Self No No   Sig: Take 1 tablet (10 mg total) by mouth daily   aspirin 81 mg chewable tablet  Self Yes No   Sig: Chew 81 mg daily   hydroCHLOROthiazide 25 mg tablet  Self No No   Sig: Take 1 tablet (25 mg total) by mouth daily   lidocaine (Lidoderm) 5 %  Self No No   Sig: Apply 1 patch  topically over 12 hours daily for 14 days Remove & Discard patch within 12 hours or as directed by MD   losartan (COZAAR) 100 MG tablet  Self No No   Sig: Take 1 tablet (100 mg total) by mouth daily   metFORMIN (GLUCOPHAGE) 500 mg tablet  Self No No   Sig: TAKE 2 TABLETS BY MOUTH TWICE A DAY WITH FOOD   methylPREDNISolone 4 MG tablet therapy pack   No No   Sig: Use as directed on package   Patient not taking: Reported on 7/29/2024   metoprolol succinate (TOPROL-XL) 100 mg 24 hr tablet  Self No No   Sig: Take 1 tablet (100 mg total) by mouth every evening   oxyCODONE (Roxicodone) 5 immediate release tablet  Self No No   Sig: Take 1 tablet (5 mg total) by mouth every 6 (six) hours as needed for moderate pain for up to 7 doses Max Daily Amount: 20 mg   rosuvastatin (CRESTOR) 40 MG tablet  Self No No   Sig: Take 1 tablet (40 mg total) by mouth every evening      Facility-Administered Medications Last Administration Doses Remaining   lidocaine (LMX) 4 % cream 7/25/2024  1:42 PM 0          Past Medical History:   Diagnosis Date    Arthritis 2010's    Occasionally flares up    Cancer (HCC) May 2021    Still investigating    Chronic kidney disease     Diabetes mellitus (HCC)     Follicular lymphoma (HCC)     GERD (gastroesophageal reflux disease) June 2021    Noticed in an Endoscopy    Heart murmur May 2020    High cholesterol     Hypertension     Kidney stone 1980's    Have had since 1980's    Obesity Since Childhood       Past Surgical History:   Procedure Laterality Date    BUNIONECTOMY Right 11/24/2020    Procedure: RIGHT HAV CORRECTION,;  Surgeon: Niranjan Child DPM;  [facility];  Service: Podiatry    COLONOSCOPY      INCISION AND DRAINAGE OF WOUND Right 10/05/2016    Procedure: INCISION AND DRAINAGE (I&D) EXTREMITY;  Surgeon: Niranjan Child DPM;  [facility];  Service:     IR BIOPSY LYMPH NODE  07/08/2021    IR EMBOLIZATION (SPECIFY VESSEL OR SITE)  07/08/2021    IR PORT PLACEMENT  9/1/2022    PILONIDAL  CYST EXCISION      MO CORRECTION HAMMERTOE Right 02/19/2019    Procedure: THIRD HAMMER TOE CORRECTION;  Surgeon: Niranjan Child DPM;  [facility];  Service: Podiatry    MO RMVL MATEO CTR VAD W/SUBQ PORT/ CTR/PRPH INSJ N/A 3/14/2023    Procedure: REMOVAL VENOUS PORT (PORT-A-CATH)IR;  Surgeon: Avelino Vázquez DO;  [facility];  Service: Interventional Radiology    TOE OSTEOTOMY Right 03/14/2017    Procedure: HAMMERTOE CORRECTION R 2 ;  Surgeon: Niranjan Child DPM;  [facility];  Service:     TOE OSTEOTOMY Left 11/24/2020    Procedure: LEFT HT CORRECTION TOE;  Surgeon: Niranjan Child DPM;  [facility];  Service: Podiatry    TONSILLECTOMY  1963       Family History   Problem Relation Age of Onset    Cancer Father         Leukemia     I have reviewed and agree with the history as documented.    E-Cigarette/Vaping    E-Cigarette Use Never User      E-Cigarette/Vaping Substances    Nicotine No     THC No     CBD No     Flavoring No     Other No     Unknown No      Social History     Tobacco Use    Smoking status: Never    Smokeless tobacco: Never    Tobacco comments:     Never smoked but exposed to second hand smoke from birth until 1980's   Vaping Use    Vaping status: Never Used   Substance Use Topics    Alcohol use: Never    Drug use: Never       Review of Systems   Constitutional:  Negative for chills and fever.   HENT:  Negative for congestion, dental problem, ear pain, sinus pressure, sinus pain, sneezing and sore throat.    Eyes:  Negative for pain, discharge, itching and visual disturbance.   Respiratory:  Negative for cough, choking, chest tightness, shortness of breath, wheezing and stridor.    Cardiovascular:  Negative for chest pain, palpitations and leg swelling.   Gastrointestinal:  Negative for abdominal pain, constipation, diarrhea, nausea and vomiting.   Genitourinary:  Negative for dysuria and hematuria.   Musculoskeletal:  Positive for arthralgias. Negative for back  pain.        Positive right shoulder.   Skin:  Negative for color change, pallor, rash and wound.   Neurological:  Positive for numbness. Negative for dizziness, seizures, syncope, facial asymmetry, weakness, light-headedness and headaches.   All other systems reviewed and are negative.      Physical Exam  Physical Exam  Vitals and nursing note reviewed.   Constitutional:       General: He is not in acute distress.     Appearance: Normal appearance. He is well-developed. He is not ill-appearing, toxic-appearing or diaphoretic.   HENT:      Head: Normocephalic and atraumatic.      Right Ear: External ear normal. There is no impacted cerumen.      Left Ear: External ear normal. There is no impacted cerumen.      Nose: No congestion or rhinorrhea.      Mouth/Throat:      Pharynx: No oropharyngeal exudate or posterior oropharyngeal erythema.   Eyes:      General:         Right eye: No discharge.         Left eye: No discharge.      Conjunctiva/sclera: Conjunctivae normal.   Cardiovascular:      Rate and Rhythm: Normal rate and regular rhythm.      Heart sounds: No murmur heard.     No friction rub. No gallop.   Pulmonary:      Effort: Pulmonary effort is normal. No respiratory distress.      Breath sounds: Normal breath sounds. No stridor. No wheezing, rhonchi or rales.   Chest:      Chest wall: No tenderness.   Abdominal:      General: There is no distension.      Palpations: Abdomen is soft.      Tenderness: There is no abdominal tenderness. There is no right CVA tenderness, left CVA tenderness or guarding.   Musculoskeletal:         General: Tenderness present. No swelling, deformity or signs of injury.      Cervical back: Neck supple. No rigidity or tenderness.      Comments: Patient showed decreased range of motion of the right shoulder, with increased pain upon movement of the right shoulder.  Patient showed tenderness of the superior trapezius muscle on the right side.  No bony tenderness noted on exam .2+ radial  pulse with appropriate capillary refill.  Normal motor including okay, crosses finger, thumb to pinky.  Normal sensory in all dermatomes of the distal arm and hand.    Lymphadenopathy:      Cervical: No cervical adenopathy.   Skin:     General: Skin is warm and dry.      Capillary Refill: Capillary refill takes less than 2 seconds.      Coloration: Skin is not jaundiced or pale.      Findings: No bruising, erythema, lesion or rash.   Neurological:      General: No focal deficit present.      Mental Status: He is alert and oriented to person, place, and time.   Psychiatric:         Mood and Affect: Mood normal.         Vital Signs  ED Triage Vitals [07/25/24 2350]   Temperature Pulse Respirations Blood Pressure SpO2   97.7 °F (36.5 °C) 85 20 158/75 99 %      Temp Source Heart Rate Source Patient Position - Orthostatic VS BP Location FiO2 (%)   Oral Monitor Sitting Left arm --      Pain Score       10 - Worst Possible Pain           Vitals:    07/25/24 2350 07/26/24 0222   BP: 158/75 149/73   Pulse: 85 80   Patient Position - Orthostatic VS: Sitting          Visual Acuity      ED Medications  Medications   methocarbamol (ROBAXIN) tablet 500 mg (500 mg Oral Given 7/26/24 0209)   oxyCODONE (ROXICODONE) IR tablet 5 mg (5 mg Oral Given 7/26/24 0209)       Diagnostic Studies  Results Reviewed       None                   No orders to display              Procedures  Procedures         ED Course                                 SBIRT 22yo+      Flowsheet Row Most Recent Value   Initial Alcohol Screen: US AUDIT-C     1. How often do you have a drink containing alcohol? 0 Filed at: 07/26/2024 0232   2. How many drinks containing alcohol do you have on a typical day you are drinking?  0 Filed at: 07/26/2024 0232   3b. FEMALE Any Age, or MALE 65+: How often do you have 4 or more drinks on one occassion? 0 Filed at: 07/26/2024 0232   Audit-C Score 0 Filed at: 07/26/2024 0232   MARVIN: How many times in the past year have you...     Used an illegal drug or used a prescription medication for non-medical reasons? Never Filed at: 07/26/2024 0232                      Medical Decision Making  Patient is a 68-year-old male presenting emergency department with right shoulder pain x 3 weeks.  Patient states that he has been seen here previously for the same complaint.  Patient states that the pain is not improving.  Patient denies any injury or heavy lifting with the shoulder.  Patient states that the pain is a sharp pain that radiates on the right arm with intermittent numbness.  Patient states last time he was here patient was given a muscle relaxer and some other type of medication that helped with the patient's pain.Patient showed decreased range of motion of the right shoulder, with increased pain upon movement of the right shoulder.  Patient showed tenderness of the superior trapezius muscle on the right side.  No bony tenderness noted on exam. DDx including but not limited to: tendinitis, bursitis, arthritis, rotator cuff injury, fracture, dislocation, contusion, strain, sprain, brachial plexus impingement, radiculopathy; doubt septic arthritis or cardiac etiology or DVT or arterial occlusion.  Findings consistent with brachial plexus impingement.  Patient provided for methocarbamol with oxycodone and Lidoderm patch placed.  Patient noted some relief.  Toradol was considered for pain relief but due to patient's GFR it was discouraged.  Patient discharged home and informed to continue current medications.  Patient also prescribed lidocaine 4% cream along with Voltaren gel to be applied to the area for further pain relief.  Patient given follow-up instructions and informed to follow-up with with comprehensive spine in outpatient setting.  Patient given strict return precautions to return to emergency department if she develops increasing pain of the affected extremity, numbness, tingling, or weakness or inability to move arm.  Patient discharged  home in stable condition, patient verbalized understanding agrees current plan.      Risk  OTC drugs.  Prescription drug management.                 Disposition  Final diagnoses:   Acute pain of right shoulder     Time reflects when diagnosis was documented in both MDM as applicable and the Disposition within this note       Time User Action Codes Description Comment    7/26/2024  2:43 AM Irineo Rome Add [M25.511] Acute pain of right shoulder           ED Disposition       ED Disposition   Discharge    Condition   Stable    Date/Time   Fri Jul 26, 2024 0243    Comment   Augustus Coffman discharge to home/self care.                   Follow-up Information    None         Discharge Medication List as of 7/26/2024  2:46 AM        START taking these medications    Details   !! Diclofenac Sodium (VOLTAREN) 1 % Apply 2 g topically 4 (four) times a day For pain to affected area, Starting Fri 7/26/2024, Print      lidocaine (LMX) 4 % cream Apply topically as needed for mild pain, Starting Fri 7/26/2024, Normal       !! - Potential duplicate medications found. Please discuss with provider.        CONTINUE these medications which have NOT CHANGED    Details   allopurinol (ZYLOPRIM) 300 mg tablet Take 1 tablet (300 mg total) by mouth daily, Starting Fri 4/12/2024, Normal      amLODIPine (NORVASC) 10 mg tablet Take 1 tablet (10 mg total) by mouth daily, Starting Fri 4/12/2024, Normal      aspirin 81 mg chewable tablet Chew 81 mg daily, Historical Med      Cholecalciferol 100 MCG (4000 UT) CAPS Take 1 capsule (4,000 Units total) by mouth in the morning, Starting Mon 4/22/2024, No Print      !! Diclofenac Sodium (VOLTAREN) 1 % Apply 2 g topically 4 (four) times a day, Starting Fri 7/12/2024, Normal      Empagliflozin (Jardiance) 25 MG TABS TAKE 1 TABLET BY MOUTH EVERY DAY IN THE MORNING, Starting Tue 7/2/2024, Normal      hydroCHLOROthiazide 25 mg tablet Take 1 tablet (25 mg total) by mouth daily, Starting Fri 4/12/2024,  Normal      lidocaine (Lidoderm) 5 % Apply 1 patch topically over 12 hours daily for 14 days Remove & Discard patch within 12 hours or as directed by MD, Starting Thu 7/11/2024, Until Thu 7/25/2024, Normal      losartan (COZAAR) 100 MG tablet Take 1 tablet (100 mg total) by mouth daily, Starting Fri 4/12/2024, Normal      metFORMIN (GLUCOPHAGE) 500 mg tablet TAKE 2 TABLETS BY MOUTH TWICE A DAY WITH FOOD, Starting Mon 5/27/2024, Normal      methylPREDNISolone 4 MG tablet therapy pack Use as directed on package, Normal      metoprolol succinate (TOPROL-XL) 100 mg 24 hr tablet Take 1 tablet (100 mg total) by mouth every evening, Starting Fri 4/12/2024, Normal      Multiple Vitamins-Minerals (Centrum Silver 50+Men) TABS Historical Med      oxyCODONE (Roxicodone) 5 immediate release tablet Take 1 tablet (5 mg total) by mouth every 6 (six) hours as needed for moderate pain for up to 7 doses Max Daily Amount: 20 mg, Starting Thu 7/11/2024, Normal      rosuvastatin (CRESTOR) 40 MG tablet Take 1 tablet (40 mg total) by mouth every evening, Starting Mon 5/13/2024, Normal      cyclobenzaprine (FLEXERIL) 10 mg tablet Take 1 tablet (10 mg total) by mouth 3 (three) times a day as needed for muscle spasms, Starting Mon 7/15/2024, Normal       !! - Potential duplicate medications found. Please discuss with provider.              PDMP Review         Value Time User    PDMP Reviewed  Yes 7/11/2024  5:15 AM Carlito Oliver MD            ED Provider  Electronically Signed by             Irineo Rome PA-C  07/31/24 [id number]

## 2024-08-01 NOTE — CASE MANAGEMENT
Case Management Discharge Planning Note    Patient name Augustus Coffman  Location /-01 MRN [id number]  : 1955 Date 2024       Current Admission Date: 2024  Current Admission Diagnosis:Sepsis without acute organ dysfunction ([facility])   Patient Active Problem List    Diagnosis Date Noted Date Diagnosed    Neck pain 2024     Sepsis without acute organ dysfunction ([facility]) 2024     Acute hypoxic respiratory failure ([facility]) 2024     Acute hyponatremia 2024     Chronic diastolic CHF (congestive heart failure) ([facility]) 2024     Encounter for deep vein thrombosis (DVT) prophylaxis 2024     Hyponatremia 2024     Aortic stenosis with trileaflet valve 07/15/2024     Ectatic thoracic aorta ([facility]) 2024     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 ([facility]) 10/09/2023     Vitamin D deficiency 2023     Stage 3a chronic kidney disease ([facility]) 2023     Follicular lymphoma grade I of lymph nodes of multiple sites ([facility]) 08/15/2022     Obesity, morbid ([facility]) 2022     Other proteinuria 2022     Stage 2 chronic kidney disease 09/15/2021     Hypertensive kidney disease with stage 2 chronic kidney disease 09/15/2021     Rash 2021     Chronic venous hypertension (idiopathic) with ulcer of right lower extremity ([facility]) 2021     GI bleed 2021     Pleural effusion 2021     Heart murmur 2021     Retroperitoneal hematoma 2021     Mesenteric lymphadenopathy 2021     Acute blood loss anemia 2021     FINA (acute kidney injury) on CKD stage 2([facility]) 2021     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     Stasis dermatitis of both legs 2019     Hammer toe of right foot 2019     Obesity with body mass index 30 or greater 10/31/2016     Diabetes 1.5, 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/2016     Hypertension 10/06/2016     Hypercholesteremia 10/06/2016     Diabetic peripheral neuropathy ([facility]) 11/10/2014     Gout  12/06/2007     Type 2 diabetes mellitus (HCC) 12/06/2007       LOS (days): 3  Geometric Mean LOS (GMLOS) (days): 5.1  Days to GMLOS:1.6     OBJECTIVE:  Risk of Unplanned Readmission Score: 19.18         Current admission status: Inpatient   Preferred Pharmacy:   [id number]  [place] - [address]  [address] [address]  Phone: [phone] Fax: [phone]    [id number] [place] - [address] [address]  Phone: [phone] Fax: [phone]    Primary Care Provider: Gwen Lazo DO    Primary Insurance: MEDICARE  Secondary Insurance: [facility]    DISCHARGE DETAILS:  CM contacted patient to obtain VNA choice. Patient explained that he would need to review list again then make choice later today. Patient stated that spouse has now been admitted into the hospital as well and will need some time to decide. CM to follow up.

## 2024-08-01 NOTE — ASSESSMENT & PLAN NOTE
"Had a acute hypoxic respiratory failure on admission and needing 2 L of oxygen, patient seems to have significantly improved right now.  This is likely due to improvement of the sepsis and discontinue opioid medications.    Blood pressure 108/66, pulse 86, temperature 98.5 °F (36.9 °C), resp. rate 13, height 6' 4\" (1.93 m), weight 122 kg (268 lb 11.9 oz), SpO2 94%.    "

## 2024-08-01 NOTE — ASSESSMENT & PLAN NOTE
Lab Results   Component Value Date    HGBA1C 6.8 (H) 07/05/2024       Recent Labs     07/31/24  1115 07/31/24  1632 07/31/24 2039 08/01/24  0605   POCGLU 152* 137 146* 162*         Blood Sugar Average: Last 72 hrs:  (P) 180.5    Continue carbohydrate consistent diet, lispro insulin sliding scale qac + qhs, and POC glucose qac + qhs

## 2024-08-01 NOTE — ASSESSMENT & PLAN NOTE
Patient is afebrile at 100.2 °F (07/28/2024, 9:44 PM), but I note that patient also carries a diagnosis of follicular B-cell lymphoma (diagnosed in 2020 by [facility] Dr. Naomi Cummings, [facility]), s/p chemotherapy x 6 months, ended in Feburary 2022, now in clinical remission; hence, patient is immunosuppressed and may be incapable of mounting a paula mayito fever of 100.4 °F or greater.    In addition, patient has an admitting heart rate of 135 bpm and a respiratory rate of 20 breaths/min, saturating 95% on room air (07/28/2024, 9:44 PM) with repeat O2 saturation 88% on room air (07/28/2024, 10:30 PM), and repeat O2 saturation 95% on 4 liters/minute O2 via NC (07/28/2024, 10:31pm).   Exam was noted for a chest that was clear to auscultation and nontender to palpation.  In addition, patient's right sole was noted for an open 1 cm ulcer at the ball of patient's right sole and which was negative for erythema (cf., the patient's entire right foot and right lower shin were actually dark brown/cyanotic), edema, induration, warmth (cf., the patient's entire right foot and right lower shin were actually cold to touch), tenderness, crepitus, fluctuance, serous/sanguinous/suppurative discharge, malodor, and lymphangitic streaking. Patient right foot wound growing Staphylococcus aureus.    Plans:  Continue antibiotic intravenous Ancef   Blood culture positive for MSSA.    Will DC vancomycin.    Patient was hypotensive and was given fluids per sepsis protocol by me yesterday and his vital signs improved.  Blood pressure and WBC count both improved.

## 2024-08-01 NOTE — ASSESSMENT & PLAN NOTE
Lab Results   Component Value Date    SODIUM 136 08/01/2024    SODIUM 134 (L) 08/01/2024    SODIUM 135 07/31/2024    SODIUM 134 (L) 07/30/2024      Sodium back to normal range  Will monitor electrolytes

## 2024-08-01 NOTE — ASSESSMENT & PLAN NOTE
Wt Readings from Last 3 Encounters:   08/01/24 122 kg (268 lb 11.9 oz)   07/25/24 121 kg (267 lb 10.2 oz)   07/17/24 121 kg (266 lb 6.4 oz)       Was given fluids per sepsis protocol and has improved.  No evidence of volume overload  Per 07/05/2024 TTE: LV EF 62%, grade I LV  diastolic dysfunction.  RV systolic function normal.

## 2024-08-01 NOTE — PLAN OF CARE
Problem: Prexisting or High Potential for Compromised Skin Integrity  Goal: Skin integrity is maintained or improved  Description: INTERVENTIONS:  - Identify patients at risk for skin breakdown  - Assess and monitor skin integrity  - Assess and monitor nutrition and hydration status  - Monitor labs   - Assess for incontinence   - Turn and reposition patient  - Assist with mobility/ambulation  - Relieve pressure over bony prominences  - Avoid friction and shearing  - Provide appropriate hygiene as needed including keeping skin clean and dry  - Evaluate need for skin moisturizer/barrier cream  - Collaborate with interdisciplinary team   - Patient/family teaching  - Consider wound care consult   Outcome: Progressing     Problem: PAIN - ADULT  Goal: Verbalizes/displays adequate comfort level or baseline comfort level  Description: Interventions:  - Encourage patient to monitor pain and request assistance  - Assess pain using appropriate pain scale  - Administer analgesics based on type and severity of pain and evaluate response  - Implement non-pharmacological measures as appropriate and evaluate response  - Consider cultural and social influences on pain and pain management  - Notify physician/advanced practitioner if interventions unsuccessful or patient reports new pain  Outcome: Progressing     Problem: SAFETY ADULT  Goal: Maintain or return to baseline ADL function  Description: INTERVENTIONS:  -  Assess patient's ability to carry out ADLs; assess patient's baseline for ADL function and identify physical deficits which impact ability to perform ADLs (bathing, care of mouth/teeth, toileting, grooming, dressing, etc.)  - Assess/evaluate cause of self-care deficits   - Assess range of motion  - Assess patient's mobility; develop plan if impaired  - Assess patient's need for assistive devices and provide as appropriate  - Encourage maximum independence but intervene and supervise when necessary  - Involve family in  performance of ADLs  - Assess for home care needs following discharge   - Consider OT consult to assist with ADL evaluation and planning for discharge  - Provide patient education as appropriate  Outcome: Progressing     Problem: DISCHARGE PLANNING  Goal: Discharge to home or other facility with appropriate resources  Description: INTERVENTIONS:  - Identify barriers to discharge w/patient and caregiver  - Arrange for needed discharge resources and transportation as appropriate  - Identify discharge learning needs (meds, wound care, etc.)  - Arrange for interpretive services to assist at discharge as needed  - Refer to Case Management Department for coordinating discharge planning if the patient needs post-hospital services based on physician/advanced practitioner order or complex needs related to functional status, cognitive ability, or social support system  Outcome: Progressing     Problem: MUSCULOSKELETAL - ADULT  Goal: Maintain proper alignment of affected body part  Description: INTERVENTIONS:  - Support, maintain and protect limb and body alignment  - Provide patient/ family with appropriate education  Outcome: Progressing

## 2024-08-01 NOTE — ASSESSMENT & PLAN NOTE
Patient has a past medical history of follicular B-cell lymphoma (diagnosed in 2020 by [facility] Dr. Naomi Cummings, [facility]), s/p chemotherapy x 6 months, ended in Feburary 2022, now in clinical remission.  Observe during the hospitalization.

## 2024-08-01 NOTE — ASSESSMENT & PLAN NOTE
Patient has been complaining of severe neck and shoulder pain.  Neck pain is usually 9/10 in intensity that decreased to 7/10 in intensity after taking oxycodone 5 mg.  Had tried morphine and Dilaudid that did not work for him.  X-ray neck done  CT neck not significant.  Pain medicines: Tylenol scheduled, Flexeril, morphine as needed.    Lidocaine patch and ice application to the affected area.  Recommended to avoid oxycodone.  08/02: Toradol, gabapentin and IV Tylenol as needed ddded for neck pain.

## 2024-08-01 NOTE — PLAN OF CARE
Problem: Prexisting or High Potential for Compromised Skin Integrity  Goal: Skin integrity is maintained or improved  Description: INTERVENTIONS:  - Identify patients at risk for skin breakdown  - Assess and monitor skin integrity  - Assess and monitor nutrition and hydration status  - Monitor labs   - Assess for incontinence   - Turn and reposition patient  - Assist with mobility/ambulation  - Relieve pressure over bony prominences  - Avoid friction and shearing  - Provide appropriate hygiene as needed including keeping skin clean and dry  - Evaluate need for skin moisturizer/barrier cream  - Collaborate with interdisciplinary team   - Patient/family teaching  - Consider wound care consult   Outcome: Progressing     Problem: PAIN - ADULT  Goal: Verbalizes/displays adequate comfort level or baseline comfort level  Description: Interventions:  - Encourage patient to monitor pain and request assistance  - Assess pain using appropriate pain scale  - Administer analgesics based on type and severity of pain and evaluate response  - Implement non-pharmacological measures as appropriate and evaluate response  - Consider cultural and social influences on pain and pain management  - Notify physician/advanced practitioner if interventions unsuccessful or patient reports new pain  Outcome: Progressing     Problem: INFECTION - ADULT  Goal: Absence or prevention of progression during hospitalization  Description: INTERVENTIONS:  - Assess and monitor for signs and symptoms of infection  - Monitor lab/diagnostic results  - Monitor all insertion sites, i.e. indwelling lines, tubes, and drains  - Monitor endotracheal if appropriate and nasal secretions for changes in amount and color  - [place] appropriate cooling/warming therapies per order  - Administer medications as ordered  - Instruct and encourage patient and family to use good hand hygiene technique  - Identify and instruct in appropriate isolation precautions for  identified infection/condition  Outcome: Progressing     Problem: SAFETY ADULT  Goal: Patient will remain free of falls  Description: INTERVENTIONS:  - Educate patient/family on patient safety including physical limitations  - Instruct patient to call for assistance with activity   - Consult OT/PT to assist with strengthening/mobility   - Keep Call bell within reach  - Keep bed low and locked with side rails adjusted as appropriate  - Keep care items and personal belongings within reach  - Initiate and maintain comfort rounds  - Make Fall Risk Sign visible to staff  - Offer Toileting every  Hours, in advance of need  - Initiate/Maintain alarm  - Obtain necessary fall risk management equipment:   - Apply yellow socks and bracelet for high fall risk patients  - Consider moving patient to room near nurses station  Outcome: Progressing

## 2024-08-01 NOTE — ASSESSMENT & PLAN NOTE
Lab Results   Component Value Date    CREATININE 0.97 08/01/2024    CREATININE 1.17 07/31/2024    CREATININE 1.20 07/30/2024    CREATININE 1.52 (H) 07/29/2024      Most likely prerenal vs ATN.   FINA resolved.  Monitor kidney function

## 2024-08-01 NOTE — DISCHARGE INSTR - AVS FIRST PAGE
Dear Augustus Coffman,     It was our pleasure to care for you here at [facility].  It is our hope that we were always able to exceed the expected standards for your care during your stay.  You were hospitalized due to sepsis.  You were cared for on the 4th floor by Fernanda Reyes MD under the service of Jessee Batres MD with the [facility] who covers for your primary care physician (PCP), Gwen Lazo DO, while you were hospitalized.  If you have any questions or concerns related to this hospitalization, you may contact us at .  For follow up as well as any medication refills, we recommend that you follow up with your primary care physician.  A registered nurse will reach out to you by phone within a few days after your discharge to answer any additional questions that you may have after going home.  However, at this time we provide for you here, the most important instructions / recommendations at discharge:     Notable Medication Adjustments -   Added toradol, tylenol, zanaflex for shoulder pain  Completed course of IV antibiotics while admitted   Testing Required after Discharge -   na  ** Please contact your PCP to request testing orders for any of the testing recommended here **  Important follow up information -   na  Other Instructions -   na  Please review this entire after visit summary as additional general instructions including medication list, appointments, activity, diet, any pertinent wound care, and other additional recommendations from your care team that may be provided for you.      Sincerely,     Fernanda Reyes MD

## 2024-08-01 NOTE — PROGRESS NOTES
Critical access hospital  Progress Note  Name: Augustus Coffman I  MRN: [id number]  Unit/Bed#: -01 I Date of Admission: 7/28/2024   Date of Service: 8/1/2024 I Hospital Day: 3    Assessment & Plan   Neck pain  Assessment & Plan  Patient has been complaining of severe neck and shoulder pain.  Neck pain is usually 9/10 in intensity that decreased to 7/10 in intensity after taking oxycodone 5 mg.  Had tried morphine and Dilaudid that did not work for him.  X-ray neck done  CT neck not significant.  Pain medicines: Tylenol scheduled, Flexeril, morphine as needed.    Lidocaine patch and ice application to the affected area.  Try to avoid oxycodone.    Encounter for deep vein thrombosis (DVT) prophylaxis  Assessment & Plan  Start pharmacologic DVT prophylaxis utilizing heparin 5000 units SQ every 8 hours (07/29/2024, 6 AM).  Of note, patient has no complaints of calf pain, leg swelling, or pleurisy, to suggest either DVT or PE on admission date 07/28/2024.    Chronic diastolic CHF (congestive heart failure) (HCC)  Assessment & Plan  Wt Readings from Last 3 Encounters:   08/01/24 122 kg (268 lb 11.9 oz)   07/25/24 121 kg (267 lb 10.2 oz)   07/17/24 121 kg (266 lb 6.4 oz)       Was given fluids per sepsis protocol and has improved.  No evidence of volume overload  Per 07/05/2024 TTE: LV EF 62%, grade I LV  diastolic dysfunction.  RV systolic function normal.        Acute hyponatremia  Assessment & Plan  Lab Results   Component Value Date    SODIUM 134 (L) 08/01/2024    SODIUM 135 07/31/2024    SODIUM 134 (L) 07/30/2024    SODIUM 134 (L) 07/29/2024      Sodium back to normal range  Will monitor electrolytes    Acute hypoxic respiratory failure (HCC)  Assessment & Plan  Had a acute hypoxic respiratory failure on admission and needing 2 L of oxygen, patient seems to have significantly improved right now.  This is likely due to improvement of the sepsis and discontinue opioid medications.    Blood pressure  "94/61, pulse 81, temperature 98.8 °F (37.1 °C), resp. rate 16, height 6' 4\" (1.93 m), weight 122 kg (268 lb 11.9 oz), SpO2 96%.      Aortic stenosis with trileaflet valve  Assessment & Plan  Patient has no complaints of angina, syncope, or acute-on-chronic diastolic CHF on admitting date 07/28/2024.  Moderate aortic stenosis present.  Will need outpatient follow-up with cardiology        Follicular lymphoma grade I of lymph nodes of multiple sites (HCC)  Assessment & Plan  Patient has a past medical history of follicular B-cell lymphoma (diagnosed in 2020 by [facility]On[facility] Dr. Naomi Cummings, [facility]), s/p chemotherapy x 6 months, ended in Feburary 2022, now in clinical remission.  Observe during the hospitalization.    FINA (acute kidney injury) on CKD stage 2([facility])  Assessment & Plan  Lab Results   Component Value Date    CREATININE 0.97 08/01/2024    CREATININE 1.17 07/31/2024    CREATININE 1.20 07/30/2024    CREATININE 1.52 (H) 07/29/2024      Most likely prerenal vs ATN.   FINA resolved.  Monitor kidney function    Hypercholesteremia  Assessment & Plan  Patient restarted on statins, Lipitor 80 mg as a substitute for Crestor 40 mg daily that he takes at home    Diabetes 1.5, managed as type 2 ([facility])  Assessment & Plan  Lab Results   Component Value Date    HGBA1C 6.8 (H) 07/05/2024       Recent Labs     07/31/24  1115 07/31/24  1632 07/31/24  2039 08/01/24  0605   POCGLU 152* 137 146* 162*         Blood Sugar Average: Last 72 hrs:  (P) 180.5    Continue carbohydrate consistent diet, lispro insulin sliding scale qac + qhs, and POC glucose qac + qhs    * Sepsis without acute organ dysfunction (HCC)  Assessment & Plan  Patient is afebrile at 100.2 °F (07/28/2024, 9:44 PM), but I note that patient also carries a diagnosis of follicular B-cell lymphoma (diagnosed in 2020 by [facility]On[facility] Dr. Naomi Cummings, [facility]), s/p chemotherapy x 6 months, ended in Feburary " 2022, now in clinical remission; hence, patient is immunosuppressed and may be incapable of mounting a paula mayito fever of 100.4 °F or greater.    In addition, patient has an admitting heart rate of 135 bpm and a respiratory rate of 20 breaths/min, saturating 95% on room air (07/28/2024, 9:44 PM) with repeat O2 saturation 88% on room air (07/28/2024, 10:30 PM), and repeat O2 saturation 95% on 4 liters/minute O2 via NC (07/28/2024, 10:31pm).   Exam was noted for a chest that was clear to auscultation and nontender to palpation.  In addition, patient's right sole was noted for an open 1 cm ulcer at the ball of patient's right sole and which was negative for erythema (cf., the patient's entire right foot and right lower shin were actually dark brown/cyanotic), edema, induration, warmth (cf., the patient's entire right foot and right lower shin were actually cold to touch), tenderness, crepitus, fluctuance, serous/sanguinous/suppurative discharge, malodor, and lymphangitic streaking. Patient right foot wound growing Staphylococcus aureus.    Plans:  Continue antibiotic intravenous Ancef   Blood culture positive for MSSA.    Will DC vancomycin.    Patient was hypotensive and was given fluids per sepsis protocol by me yesterday and his vital signs improved.  Blood pressure and WBC count both improved.               VTE Pharmacologic Prophylaxis: VTE Score: 3 Moderate Risk (Score 3-4) - Pharmacological DVT Prophylaxis Ordered: heparin.    Mobility:   Basic Mobility Inpatient Raw Score: 17  JH-HLM Goal: 5: Stand one or more mins  JH-HLM Achieved: 4: Move to chair/commode  JH-HLM Goal achieved. Continue to encourage appropriate mobility.    Patient Centered Rounds: I performed bedside rounds with nursing staff today.   Discussions with Specialists or Other Care Team Provider:     Education and Discussions with Family / Patient: Left voicemail for patient's daughter Sandi.    Total Time Spent on Date of Encounter in care of  patient: 30 mins. This time was spent on one or more of the following: performing physical exam; counseling and coordination of care; obtaining or reviewing history; documenting in the medical record; reviewing/ordering tests, medications or procedures; communicating with other healthcare professionals and discussing with patient's family/caregivers.    Current Length of Stay: 3 day(s)  Current Patient Status: Inpatient   Certification Statement: The patient will continue to require additional inpatient hospital stay due to sepsissecondary to foot ulcer  Discharge Plan: Anticipate discharge tomorrow to home.    Code Status: Level 1 - Full Code    Subjective:   Patient seen and examined at bedside this morning.  He was sitting comfortably in the recliner chair.  He reports that he slept well last night after taking 2 doses of oxycodone.  He said he feels much better this morning.  Denied chest pain, shortness of breath, fever, chills.  He reports that his wife is also at the hospital due to postop complication.  He was recommended to try scheduled Tylenol for neck pain instead of oxycodone..  He is agreeable to the plan.  He is looking forward to go home tomorrow if his pain is stable.    Objective:     Vitals:   Temp (24hrs), Av.3 °F (36.8 °C), Min:97.6 °F (36.4 °C), Max:98.8 °F (37.1 °C)    Temp:  [97.6 °F (36.4 °C)-98.8 °F (37.1 °C)] 98.8 °F (37.1 °C)  HR:  [81-97] 81  Resp:  [16-18] 16  BP: ()/(57-77) 94/61  SpO2:  [94 %-97 %] 96 %  Body mass index is 32.71 kg/m².     Input and Output Summary (last 24 hours):     Intake/Output Summary (Last 24 hours) at 2024 0989  Last data filed at 2024 0838  Gross per 24 hour   Intake 240 ml   Output --   Net 240 ml       Physical Exam:   Physical Exam  Vitals and nursing note reviewed.   Constitutional:       General: He is not in acute distress.     Appearance: He is well-developed. He is obese.   HENT:      Head: Normocephalic and atraumatic.   Eyes:       Extraocular Movements: Extraocular movements intact.      Conjunctiva/sclera: Conjunctivae normal.      Pupils: Pupils are equal, round, and reactive to light.   Cardiovascular:      Rate and Rhythm: Normal rate and regular rhythm.      Heart sounds: Normal heart sounds. No murmur heard.  Pulmonary:      Effort: Pulmonary effort is normal. No respiratory distress.      Breath sounds: Normal breath sounds. No wheezing or rales.   Abdominal:      General: Bowel sounds are normal. There is no distension.      Palpations: Abdomen is soft.      Tenderness: There is no abdominal tenderness.   Musculoskeletal:         General: No swelling.      Cervical back: Neck supple.      Right lower leg: No edema.      Left lower leg: No edema.   Skin:     General: Skin is warm and dry.      Capillary Refill: Capillary refill takes less than 2 seconds.      Coloration: Skin is not pale.   Neurological:      Mental Status: He is alert and oriented to person, place, and time.   Psychiatric:         Mood and Affect: Mood normal.          Additional Data:     Labs:  Results from last 7 days   Lab Units 08/01/24 0449 07/29/24 0412 07/28/24  2218   WBC Thousand/uL 6.89   < > 28.63*   HEMOGLOBIN g/dL 10.6*   < > 13.6   HEMATOCRIT % 33.0*   < > 43.9   PLATELETS Thousands/uL 182   < > 250   BANDS PCT %  --   --  3   LYMPHO PCT %  --   --  2*   MONO PCT %  --   --  5   EOS PCT %  --   --  0    < > = values in this interval not displayed.     Results from last 7 days   Lab Units 08/01/24 0449 07/29/24 0415 07/28/24  2218   SODIUM mmol/L 134*   < > 133*   POTASSIUM mmol/L 4.1   < > 4.4   CHLORIDE mmol/L 99   < > 96   CO2 mmol/L 25   < > 21   BUN mg/dL 25   < > 27*   CREATININE mg/dL 0.97   < > 1.18   ANION GAP mmol/L 10   < > 16*   CALCIUM mg/dL 9.4   < > 9.9   ALBUMIN g/dL  --   --  3.9   TOTAL BILIRUBIN mg/dL  --   --  0.66   ALK PHOS U/L  --   --  92   ALT U/L  --   --  43   AST U/L  --   --  29   GLUCOSE RANDOM mg/dL 142*   < > 224*    <  > = values in this interval not displayed.         Results from last 7 days   Lab Units 08/01/24  0605 07/31/24  2039 07/31/24  1632 07/31/24  1115 07/31/24  0634 07/30/24  2103 07/30/24  1618 07/30/24  1049 07/30/24  0638 07/30/24  0116 07/29/24  2334 07/29/24  2125   POC GLUCOSE mg/dl 162* 146* 137 152* 147* 157* 168* 171* 165* 180* 159* 147*         Results from last 7 days   Lab Units 07/30/24  0507 07/29/24  2144 07/29/24  1805 07/29/24  0416 07/28/24  2245 07/28/24  2218   LACTIC ACID mmol/L  --  1.4 2.1* 1.0 1.3  --    PROCALCITONIN ng/ml 2.81*  --  3.81* 2.95*  --  0.59*       Lines/Drains:  Invasive Devices       Peripheral Intravenous Line  Duration             Peripheral IV 07/31/24 Dorsal (posterior);Right Hand <1 day                          Imaging: Reviewed radiology reports from this admission including: xray(s)  CT soft tissue neck w contrast   Final Result by Roderick Chamorro MD (08/01 0636)      The epiglottis appears within normal limits.      History of B-cell lymphoma status post chemotherapy. Small bilateral submandibular, jugular chain and posterior triangle lymph nodes which are below size criteria for pathologic adenopathy.      Workstation performed: [id number]         XR neck soft tissue   Final Result by Abdelrahman Guzman MD (07/31 1628)      The epiglottis appears questionably thickened. Depending on level of concern for epiglottitis, consider contrast-enhanced CT of the neck for further evaluation.      The study was marked in EPIC for immediate notification.      Workstation performed: [id number]         XR foot 3+ views RIGHT   Final Result by Tanesha Cartagena MD (07/29 1033)      No acute osseous abnormality.   Chronic findings, as above.         Computerized Assisted Algorithm (CAA) may have been used to analyze all applicable images.         Workstation performed: [id number]         CTA ED chest PE Study   Final Result by Gabriel Woods MD (07/28 2338)      1.  No acute  pulmonary embolism to the segmental level with evaluation of the more peripheral subsegmental branches limited by timing of contrast bolus and inadequate contrast opacification.   2.  Ectatic ascending thoracic aorta again noted measuring up to 4.6 cm in diameter, not significantly changed. No aortic dissection.   3.  Diffuse wall thickening of the left ventricle including interventricular septum noted suggesting left ventricular hypertrophy.   4.  No lobar airspace consolidation/pneumonia. No pleural or pericardial effusions.      Workstation performed: [id number]         XR chest 2 views   Final Result by Angela Montes De Oca MD (07/28 2300)      No acute cardiopulmonary disease.            Workstation performed: [id number]             Recent Cultures (last 7 days):   Results from last 7 days   Lab Units 07/29/24  0846 07/28/24  2339   BLOOD CULTURE   --  Staphylococcus aureus*  Staphylococcus aureus*   GRAM STAIN RESULT  No Polys or Bacteria seen Gram positive cocci in clusters*  Gram positive cocci in clusters*   WOUND CULTURE  3+ Growth of Staphylococcus aureus*  --        Last 24 Hours Medication List:   Current Facility-Administered Medications   Medication Dose Route Frequency Provider Last Rate    acetaminophen  975 mg Oral Q8H [facility] May Kalyn Reyes MD      amLODIPine  10 mg Oral Daily Irineo Pedersen MD      aspirin  81 mg Oral Daily Irineo Pedersen MD      atorvastatin  80 mg Oral Daily With Dinner Jessee Batres MD      cefazolin  2,000 mg Intravenous Q8H May Kalyn Reyes MD 2,000 mg (08/01/24 0221)    cyclobenzaprine  10 mg Oral TID PRN Irineo Pedersen MD      Diclofenac Sodium  2 g Topical 4x Daily May Kalyn Reyes MD      heparin (porcine)  5,000 Units Subcutaneous Q8H Irineo Pedersen MD      HYDROmorphone  0.2 mg Intravenous Q2H PRN May Kalyn Reyes MD      insulin lispro  2-12 Units Subcutaneous TID AC May Kalyn Reyes MD      insulin lispro  2-12 Units Subcutaneous HS May Kalyn Reyes MD      lidocaine  1 patch Topical Daily May  Kalyn Reyes MD      losartan  100 mg Oral Daily Irineo Pedersen MD      metoprolol succinate  100 mg Oral QPM Irineo Pedersen MD      morphine injection  4 mg Intravenous Q4H PRN May Kalyn Reyes MD      nitroglycerin  0.4 mg Sublingual Q5 Min PRN Irineo Pedersen MD      oxyCODONE  2.5 mg Oral Q4H PRN May Kalyn Reyes MD      polyethylene glycol  17 g Oral Daily May Kalyn Reyes MD      senna  1 tablet Oral HS May Kalyn Reyes MD          Today, Patient Was Seen By: Fernanda Reyes MD    **Please Note: This note may have been constructed using a voice recognition system.**

## 2024-08-01 NOTE — ASSESSMENT & PLAN NOTE
Patient has a past medical history of follicular B-cell lymphoma (diagnosed in 2020 by [facility]e-On[facility] Dr. Naomi Cummings, [facility]), s/p chemotherapy x 6 months, ended in Feburary 2022, now in clinical remission.  Observe during the hospitalization.

## 2024-08-01 NOTE — ASSESSMENT & PLAN NOTE
Patient has been complaining of severe neck and shoulder pain.  Neck pain is usually 9/10 in intensity that decreased to 7/10 in intensity after taking oxycodone 5 mg.  Had tried morphine and Dilaudid that did not work for him.  X-ray neck done  CT neck not significant.  Pain medicines: Tylenol scheduled, Flexeril, morphine as needed.    Lidocaine patch and ice application to the affected area.  Try to avoid oxycodone.

## 2024-08-01 NOTE — ASSESSMENT & PLAN NOTE
Lab Results   Component Value Date    CREATININE 1.11 08/01/2024    CREATININE 0.97 08/01/2024    CREATININE 1.17 07/31/2024    CREATININE 1.20 07/30/2024      FINA on admission.  Most likely prerenal vs ATN.   FINA resolved.  Monitor kidney function

## 2024-08-01 NOTE — ASSESSMENT & PLAN NOTE
Lab Results   Component Value Date    HGBA1C 6.8 (H) 07/05/2024       Recent Labs     07/31/24  1632 07/31/24 2039 08/01/24  0605 08/01/24  1056   POCGLU 137 146* 162* 146*         Blood Sugar Average: Last 72 hrs:  (P) [id number]    Continue carbohydrate consistent diet, lispro insulin sliding scale qac + qhs, and POC glucose qac + qhs

## 2024-08-01 NOTE — DISCHARGE SUMMARY
[facility]  Discharge- Augustus Coffman 1955, 68 y.o. male MRN: [id number]  Unit/Bed#: [facility]-01 Encounter: [id number]  Primary Care Provider: Gwen Lazo DO   Date and time admitted to hospital: 7/28/2024  9:46 PM    * Sepsis without acute organ dysfunction (HCC)  Assessment & Plan  Likely secondary to diabetic foot wound  Wound culture growing staph aureus  Completed 7 days of IV antibiotics  Sepsis resolved  Stable for discharge off antibiotics     Neck pain  Assessment & Plan  Likely musculoskeletal pain  Has followed with orthopedic surgery in the past  Was told, per patient report, that pain is secondary to arthritis  Does have full range of motion  Improved with toradol and tylenol  Prescribe tylenol and zanaflex on discharge  Provide small course of toradol for acute pain  Creatinine can tolerate based on GFR    FINA (acute kidney injury) on CKD stage 2([facility])  Assessment & Plan  Lab Results   Component Value Date    CREATININE 0.95 08/03/2024    CREATININE 1.03 08/02/2024    CREATININE 1.11 08/01/2024    CREATININE 0.97 08/01/2024      FINA on admission.  Most likely prerenal vs ATN.   FINA resolved.    Diabetes 1.5, managed as type 2 ([facility])  Assessment & Plan  Lab Results   Component Value Date    HGBA1C 6.8 (H) 07/05/2024       Recent Labs     08/02/24  1551 08/02/24  2113 08/03/24  0637 08/03/24  1110   POCGLU 146* 188* 142* 176*         Blood Sugar Average: Last 72 hrs:  (P) [id number]    Adequately controlled  Continue home regimen on discharge        Medical Problems       Resolved Problems  Date Reviewed: 8/2/2024   None       Discharging Resident: Yogesh Casillas MD  Discharging Attending: Yogesh Stewart MD  PCP: Gwen Lazo DO  Admission Date:   Admission Orders (From admission, onward)       Ordered        07/29/24 0128  Inpatient Admission  Once                          Discharge Date: 08/03/24    Consultations During Hospital  Stay:  none    Procedures Performed:   none    Significant Findings / Test Results:   CT soft tissue neck w contrast   Final Result by Roderick Chamorro MD (08/01 0636)      The epiglottis appears within normal limits.      History of B-cell lymphoma status post chemotherapy. Small bilateral submandibular, jugular chain and posterior triangle lymph nodes which are below size criteria for pathologic adenopathy.      Workstation performed: [id number]         XR neck soft tissue   Final Result by Abdlerahman Guzman MD (07/31 1628)      The epiglottis appears questionably thickened. Depending on level of concern for epiglottitis, consider contrast-enhanced CT of the neck for further evaluation.      The study was marked in EPIC for immediate notification.      Workstation performed: [id number]         XR foot 3+ views RIGHT   Final Result by Tanesha Cartagena MD (07/29 1033)      No acute osseous abnormality.   Chronic findings, as above.         Computerized Assisted Algorithm (CAA) may have been used to analyze all applicable images.         Workstation performed: [id number]         CTA ED chest PE Study   Final Result by Gabriel Woods MD (07/28 2338)      1.  No acute pulmonary embolism to the segmental level with evaluation of the more peripheral subsegmental branches limited by timing of contrast bolus and inadequate contrast opacification.   2.  Ectatic ascending thoracic aorta again noted measuring up to 4.6 cm in diameter, not significantly changed. No aortic dissection.   3.  Diffuse wall thickening of the left ventricle including interventricular septum noted suggesting left ventricular hypertrophy.   4.  No lobar airspace consolidation/pneumonia. No pleural or pericardial effusions.      Workstation performed: [id number]         XR chest 2 views   Final Result by Angela Montes De Oca MD (07/28 2300)      No acute cardiopulmonary disease.            Workstation performed: [id number]           "    Incidental Findings:   none    Test Results Pending at Discharge (will require follow up):  none     Outpatient Tests Requested:  none    Complications:  N/A    Reason for Admission:     Hospital Course:   Augustus Coffman is a 68 y.o. male patient who originally presented to the hospital on 7/28/2024 due to sepsis secondary to right foot wound and severe right neck and right shoulder pain.  Patient met sepsis criteria on admission and had started on antibiotics.  Patient had oxygen desats to 88% on room air on admission.  He was placed on 4 L/min of oxygen and oxygen saturations have improved.  Patient had tried IV morphine for severe neck and shoulder pain but it did not help.  He found improvement with oxycodone 5 mg pain med.  Wound care was consulted for right foot wound.  PT OT evaluation recommended moderate resource intensity.  Neck x-ray and CT soft tissue neck are unremarkable.  Patient was recommended to avoid oxycodone in the setting of acute hypoxic respiratory failure.  Acute kidney injury had resolved after 1 day.  Patient completed 7 day course of antibiotics now stable for discharge.       Please see above list of diagnoses and related plan for additional information.     Condition at Discharge: stable    Discharge Day Visit / Exam:   Subjective:        Vitals: Blood Pressure: 145/83 (08/03/24 0716)  Pulse: 69 (08/03/24 0716)  Temperature: 97.6 °F (36.4 °C) (08/02/24 2152)  Temp Source: Oral (07/31/24 1420)  Respirations: 16 (08/03/24 0716)  Height: 6' 4\" (193 cm) (07/29/24 1000)  Weight - Scale: 121 kg (267 lb 13.7 oz) (08/03/24 0600)  SpO2: 96 % (08/03/24 0716)  Exam:   Physical Exam  Vitals and nursing note reviewed.   Constitutional:       General: He is not in acute distress.     Appearance: He is well-developed. He is obese.   HENT:      Head: Normocephalic and atraumatic.   Eyes:      Conjunctiva/sclera: Conjunctivae normal.   Cardiovascular:      Rate and Rhythm: Normal rate and " regular rhythm.      Heart sounds: No murmur heard.  Pulmonary:      Effort: Pulmonary effort is normal. No respiratory distress.      Breath sounds: Normal breath sounds.   Abdominal:      Palpations: Abdomen is soft.      Tenderness: There is no abdominal tenderness.   Musculoskeletal:         General: No swelling.      Cervical back: Neck supple.   Skin:     General: Skin is warm and dry.      Capillary Refill: Capillary refill takes less than 2 seconds.   Neurological:      Mental Status: He is alert and oriented to person, place, and time.   Psychiatric:         Mood and Affect: Mood normal.          Discussion with Family: Updated  (daughter) via phone.    Discharge instructions/Information to patient and family:   See after visit summary for information provided to patient and family.      Provisions for Follow-Up Care:  See after visit summary for information related to follow-up care and any pertinent home health orders.      Mobility at time of Discharge:   Basic Mobility Inpatient Raw Score: 18  JH-HLM Goal: 6: Walk 10 steps or more  JH-HLM Achieved: 7: Walk 25 feet or more  HLM Goal achieved. Continue to encourage appropriate mobility.     Disposition:   Home    Planned Readmission: N/A    Discharge Medications:  See after visit summary for reconciled discharge medications provided to patient and/or family.      **Please Note: This note may have been constructed using a voice recognition system**

## 2024-08-01 NOTE — ASSESSMENT & PLAN NOTE
"Had a acute hypoxic respiratory failure on admission and needing 2 L of oxygen, patient seems to have significantly improved right now.  This is likely due to improvement of the sepsis and discontinue opioid medications.    Blood pressure 94/61, pulse 81, temperature 98.8 °F (37.1 °C), resp. rate 16, height 6' 4\" (1.93 m), weight 122 kg (268 lb 11.9 oz), SpO2 96%.    "

## 2024-08-02 LAB
ANION GAP SERPL CALCULATED.3IONS-SCNC: 9 MMOL/L (ref 4–13)
BUN SERPL-MCNC: 28 MG/DL (ref 5–25)
CALCIUM SERPL-MCNC: 9.5 MG/DL (ref 8.4–10.2)
CHLORIDE SERPL-SCNC: 100 MMOL/L (ref 96–108)
CO2 SERPL-SCNC: 29 MMOL/L (ref 21–32)
CREAT SERPL-MCNC: 1.03 MG/DL (ref 0.6–1.3)
ERYTHROCYTE [DISTWIDTH] IN BLOOD BY AUTOMATED COUNT: 16.9 % (ref 11.6–15.1)
GFR SERPL CREATININE-BSD FRML MDRD: 74 ML/MIN/1.73SQ M
GLUCOSE SERPL-MCNC: 139 MG/DL (ref 65–140)
GLUCOSE SERPL-MCNC: 146 MG/DL (ref 65–140)
GLUCOSE SERPL-MCNC: 147 MG/DL (ref 65–140)
GLUCOSE SERPL-MCNC: 150 MG/DL (ref 65–140)
GLUCOSE SERPL-MCNC: 188 MG/DL (ref 65–140)
HCT VFR BLD AUTO: 32.5 % (ref 36.5–49.3)
HGB BLD-MCNC: 10 G/DL (ref 12–17)
MCH RBC QN AUTO: 25.4 PG (ref 26.8–34.3)
MCHC RBC AUTO-ENTMCNC: 30.8 G/DL (ref 31.4–37.4)
MCV RBC AUTO: 83 FL (ref 82–98)
PLATELET # BLD AUTO: 197 THOUSANDS/UL (ref 149–390)
PMV BLD AUTO: 10.5 FL (ref 8.9–12.7)
POTASSIUM SERPL-SCNC: 4.3 MMOL/L (ref 3.5–5.3)
RBC # BLD AUTO: 3.94 MILLION/UL (ref 3.88–5.62)
SODIUM SERPL-SCNC: 138 MMOL/L (ref 135–147)
WBC # BLD AUTO: 6.13 THOUSAND/UL (ref 4.31–10.16)

## 2024-08-02 PROCEDURE — 85027 COMPLETE CBC AUTOMATED: CPT | Performed by: INTERNAL MEDICINE

## 2024-08-02 PROCEDURE — 80048 BASIC METABOLIC PNL TOTAL CA: CPT | Performed by: INTERNAL MEDICINE

## 2024-08-02 PROCEDURE — 99232 SBSQ HOSP IP/OBS MODERATE 35: CPT | Performed by: STUDENT IN AN ORGANIZED HEALTH CARE EDUCATION/TRAINING PROGRAM

## 2024-08-02 PROCEDURE — 82948 REAGENT STRIP/BLOOD GLUCOSE: CPT

## 2024-08-02 RX ORDER — TIZANIDINE 2 MG/1
4 TABLET ORAL 3 TIMES DAILY
Status: DISCONTINUED | OUTPATIENT
Start: 2024-08-02 | End: 2024-08-03 | Stop reason: HOSPADM

## 2024-08-02 RX ORDER — ACETAMINOPHEN 10 MG/ML
1000 INJECTION, SOLUTION INTRAVENOUS EVERY 6 HOURS PRN
Status: DISCONTINUED | OUTPATIENT
Start: 2024-08-02 | End: 2024-08-03 | Stop reason: HOSPADM

## 2024-08-02 RX ORDER — GABAPENTIN 100 MG/1
100 CAPSULE ORAL 3 TIMES DAILY
Status: DISCONTINUED | OUTPATIENT
Start: 2024-08-02 | End: 2024-08-03 | Stop reason: HOSPADM

## 2024-08-02 RX ORDER — KETOROLAC TROMETHAMINE 30 MG/ML
15 INJECTION, SOLUTION INTRAMUSCULAR; INTRAVENOUS EVERY 6 HOURS SCHEDULED
Status: DISPENSED | OUTPATIENT
Start: 2024-08-02 | End: 2024-08-03

## 2024-08-02 RX ADMIN — POLYETHYLENE GLYCOL 3350 17 G: 17 POWDER, FOR SOLUTION ORAL at 09:13

## 2024-08-02 RX ADMIN — INSULIN LISPRO 2 UNITS: 100 INJECTION, SOLUTION INTRAVENOUS; SUBCUTANEOUS at 22:41

## 2024-08-02 RX ADMIN — ASPIRIN 81 MG: 81 TABLET, CHEWABLE ORAL at 09:10

## 2024-08-02 RX ADMIN — HEPARIN SODIUM 5000 UNITS: 5000 INJECTION INTRAVENOUS; SUBCUTANEOUS at 14:28

## 2024-08-02 RX ADMIN — CEFAZOLIN SODIUM 2000 MG: 2 SOLUTION INTRAVENOUS at 11:24

## 2024-08-02 RX ADMIN — SENNOSIDES 8.6 MG: 8.6 TABLET, FILM COATED ORAL at 22:40

## 2024-08-02 RX ADMIN — HEPARIN SODIUM 5000 UNITS: 5000 INJECTION INTRAVENOUS; SUBCUTANEOUS at 05:04

## 2024-08-02 RX ADMIN — ACETAMINOPHEN 1000 MG: 10 INJECTION INTRAVENOUS at 22:40

## 2024-08-02 RX ADMIN — TIZANIDINE 4 MG: 2 TABLET ORAL at 19:45

## 2024-08-02 RX ADMIN — LIDOCAINE 1 PATCH: 50 PATCH CUTANEOUS at 09:11

## 2024-08-02 RX ADMIN — GABAPENTIN 100 MG: 100 CAPSULE ORAL at 22:41

## 2024-08-02 RX ADMIN — ATORVASTATIN CALCIUM 80 MG: 40 TABLET, FILM COATED ORAL at 16:54

## 2024-08-02 RX ADMIN — Medication 2 G: at 09:13

## 2024-08-02 RX ADMIN — TIZANIDINE 4 MG: 2 TABLET ORAL at 11:24

## 2024-08-02 RX ADMIN — ACETAMINOPHEN 975 MG: 325 TABLET, FILM COATED ORAL at 05:04

## 2024-08-02 RX ADMIN — INSULIN LISPRO 2 UNITS: 100 INJECTION, SOLUTION INTRAVENOUS; SUBCUTANEOUS at 11:25

## 2024-08-02 RX ADMIN — GABAPENTIN 100 MG: 100 CAPSULE ORAL at 16:54

## 2024-08-02 RX ADMIN — KETOROLAC TROMETHAMINE 15 MG: 30 INJECTION, SOLUTION INTRAMUSCULAR; INTRAVENOUS at 17:01

## 2024-08-02 RX ADMIN — CEFAZOLIN SODIUM 2000 MG: 2 SOLUTION INTRAVENOUS at 03:34

## 2024-08-02 RX ADMIN — Medication 2 G: at 17:02

## 2024-08-02 RX ADMIN — Medication 2.5 MG: at 03:34

## 2024-08-02 RX ADMIN — KETOROLAC TROMETHAMINE 15 MG: 30 INJECTION, SOLUTION INTRAMUSCULAR; INTRAVENOUS at 11:24

## 2024-08-02 RX ADMIN — GABAPENTIN 100 MG: 100 CAPSULE ORAL at 09:12

## 2024-08-02 RX ADMIN — Medication 2.5 MG: at 16:55

## 2024-08-02 RX ADMIN — HEPARIN SODIUM 5000 UNITS: 5000 INJECTION INTRAVENOUS; SUBCUTANEOUS at 22:41

## 2024-08-02 RX ADMIN — CEFAZOLIN SODIUM 2000 MG: 2 SOLUTION INTRAVENOUS at 19:45

## 2024-08-02 NOTE — PLAN OF CARE
Problem: INFECTION - ADULT  Goal: Absence or prevention of progression during hospitalization  Description: INTERVENTIONS:  - Assess and monitor for signs and symptoms of infection  - Monitor lab/diagnostic results  - Monitor all insertion sites, i.e. indwelling lines, tubes, and drains  - Monitor endotracheal if appropriate and nasal secretions for changes in amount and color  - [place] appropriate cooling/warming therapies per order  - Administer medications as ordered  - Instruct and encourage patient and family to use good hand hygiene technique  - Identify and instruct in appropriate isolation precautions for identified infection/condition  Outcome: Progressing     Problem: PAIN - ADULT  Goal: Verbalizes/displays adequate comfort level or baseline comfort level  Description: Interventions:  - Encourage patient to monitor pain and request assistance  - Assess pain using appropriate pain scale  - Administer analgesics based on type and severity of pain and evaluate response  - Implement non-pharmacological measures as appropriate and evaluate response  - Consider cultural and social influences on pain and pain management  - Notify physician/advanced practitioner if interventions unsuccessful or patient reports new pain  Outcome: Progressing

## 2024-08-02 NOTE — PLAN OF CARE
Problem: Prexisting or High Potential for Compromised Skin Integrity  Goal: Skin integrity is maintained or improved  Description: INTERVENTIONS:  - Identify patients at risk for skin breakdown  - Assess and monitor skin integrity  - Assess and monitor nutrition and hydration status  - Monitor labs   - Assess for incontinence   - Turn and reposition patient  - Assist with mobility/ambulation  - Relieve pressure over bony prominences  - Avoid friction and shearing  - Provide appropriate hygiene as needed including keeping skin clean and dry  - Evaluate need for skin moisturizer/barrier cream  - Collaborate with interdisciplinary team   - Patient/family teaching  - Consider wound care consult   Outcome: Progressing     Problem: PAIN - ADULT  Goal: Verbalizes/displays adequate comfort level or baseline comfort level  Description: Interventions:  - Encourage patient to monitor pain and request assistance  - Assess pain using appropriate pain scale  - Administer analgesics based on type and severity of pain and evaluate response  - Implement non-pharmacological measures as appropriate and evaluate response  - Consider cultural and social influences on pain and pain management  - Notify physician/advanced practitioner if interventions unsuccessful or patient reports new pain  Outcome: Progressing     Problem: INFECTION - ADULT  Goal: Absence or prevention of progression during hospitalization  Description: INTERVENTIONS:  - Assess and monitor for signs and symptoms of infection  - Monitor lab/diagnostic results  - Monitor all insertion sites, i.e. indwelling lines, tubes, and drains  - Monitor endotracheal if appropriate and nasal secretions for changes in amount and color  - [place] appropriate cooling/warming therapies per order  - Administer medications as ordered  - Instruct and encourage patient and family to use good hand hygiene technique  - Identify and instruct in appropriate isolation precautions for  identified infection/condition  Outcome: Progressing     Problem: SAFETY ADULT  Goal: Patient will remain free of falls  Description: INTERVENTIONS:  - Educate patient/family on patient safety including physical limitations  - Instruct patient to call for assistance with activity   - Consult OT/PT to assist with strengthening/mobility   - Keep Call bell within reach  - Keep bed low and locked with side rails adjusted as appropriate  - Keep care items and personal belongings within reach  - Initiate and maintain comfort rounds  - Make Fall Risk Sign visible to staff  - Offer Toileting every  Hours, in advance of need  - Initiate/Maintain alarm  - Obtain necessary fall risk management equipment  - Apply yellow socks and bracelet for high fall risk patients  - Consider moving patient to room near nurses station  Outcome: Progressing  Goal: Maintain or return to baseline ADL function  Description: INTERVENTIONS:  -  Assess patient's ability to carry out ADLs; assess patient's baseline for ADL function and identify physical deficits which impact ability to perform ADLs (bathing, care of mouth/teeth, toileting, grooming, dressing, etc.)  - Assess/evaluate cause of self-care deficits   - Assess range of motion  - Assess patient's mobility; develop plan if impaired  - Assess patient's need for assistive devices and provide as appropriate  - Encourage maximum independence but intervene and supervise when necessary  - Involve family in performance of ADLs  - Assess for home care needs following discharge   - Consider OT consult to assist with ADL evaluation and planning for discharge  - Provide patient education as appropriate  Outcome: Progressing     Problem: DISCHARGE PLANNING  Goal: Discharge to home or other facility with appropriate resources  Description: INTERVENTIONS:  - Identify barriers to discharge w/patient and caregiver  - Arrange for needed discharge resources and transportation as appropriate  - Identify  discharge learning needs (meds, wound care, etc.)  - Arrange for interpretive services to assist at discharge as needed  - Refer to Case Management Department for coordinating discharge planning if the patient needs post-hospital services based on physician/advanced practitioner order or complex needs related to functional status, cognitive ability, or social support system  Outcome: Progressing     Problem: Knowledge Deficit  Goal: Patient/family/caregiver demonstrates understanding of disease process, treatment plan, medications, and discharge instructions  Description: Complete learning assessment and assess knowledge base.  Interventions:  - Provide teaching at level of understanding  - Provide teaching via preferred learning methods  Outcome: Progressing     Problem: MUSCULOSKELETAL - ADULT  Goal: Maintain or return mobility to safest level of function  Description: INTERVENTIONS:  - Assess patient's ability to carry out ADLs; assess patient's baseline for ADL function and identify physical deficits which impact ability to perform ADLs (bathing, care of mouth/teeth, toileting, grooming, dressing, etc.)  - Assess/evaluate cause of self-care deficits   - Assess range of motion  - Assess patient's mobility  - Assess patient's need for assistive devices and provide as appropriate  - Encourage maximum independence but intervene and supervise when necessary  - Involve family in performance of ADLs  - Assess for home care needs following discharge   - Consider OT consult to assist with ADL evaluation and planning for discharge  - Provide patient education as appropriate  Outcome: Progressing  Goal: Maintain proper alignment of affected body part  Description: INTERVENTIONS:  - Support, maintain and protect limb and body alignment  - Provide patient/ family with appropriate education  Outcome: Progressing     Problem: Nutrition/Hydration-ADULT  Goal: Nutrient/Hydration intake appropriate for improving, restoring or  maintaining nutritional needs  Description: Monitor and assess patient's nutrition/hydration status for malnutrition. Collaborate with interdisciplinary team and initiate plan and interventions as ordered.  Monitor patient's weight and dietary intake as ordered or per policy. Utilize nutrition screening tool and intervene as necessary. Determine patient's food preferences and provide high-protein, high-caloric foods as appropriate.     INTERVENTIONS:  - Monitor oral intake, urinary output, labs, and treatment plans  - Assess nutrition and hydration status and recommend course of action  - Evaluate amount of meals eaten  - Assist patient with eating if necessary   - Allow adequate time for meals  - Recommend/ encourage appropriate diets, oral nutritional supplements, and vitamin/mineral supplements  - Order, calculate, and assess calorie counts as needed  - Recommend, monitor, and adjust tube feedings and TPN/PPN based on assessed needs  - Assess need for intravenous fluids  - Provide specific nutrition/hydration education as appropriate  - Include patient/family/caregiver in decisions related to nutrition  Outcome: Progressing

## 2024-08-02 NOTE — PROGRESS NOTES
[facility]  Progress Note  Name: Augustus Coffman I  MRN: [id number]  Unit/Bed#: -01 I Date of Admission: 7/28/2024   Date of Service: 8/2/2024 I Hospital Day: 4    Assessment & Plan   * Sepsis without acute organ dysfunction (HCC)  Assessment & Plan  Patient is afebrile at 100.2 °F (07/28/2024, 9:44 PM), but I note that patient also carries a diagnosis of follicular B-cell lymphoma (diagnosed in 2020 by [facility] Heme-[facility] Dr. Naomi Cummings, [facility]), s/p chemotherapy x 6 months, ended in Feburary 2022, now in clinical remission; hence, patient is immunosuppressed and may be incapable of mounting a paula mayito fever of 100.4 °F or greater.    In addition, patient has an admitting heart rate of 135 bpm and a respiratory rate of 20 breaths/min, saturating 95% on room air (07/28/2024, 9:44 PM) with repeat O2 saturation 88% on room air (07/28/2024, 10:30 PM), and repeat O2 saturation 95% on 4 liters/minute O2 via NC (07/28/2024, 10:31pm).   Exam was noted for a chest that was clear to auscultation and nontender to palpation.  In addition, patient's right sole was noted for an open 1 cm ulcer at the ball of patient's right sole and which was negative for erythema (cf., the patient's entire right foot and right lower shin were actually dark brown/cyanotic), edema, induration, warmth (cf., the patient's entire right foot and right lower shin were actually cold to touch), tenderness, crepitus, fluctuance, serous/sanguinous/suppurative discharge, malodor, and lymphangitic streaking. Patient right foot wound growing Staphylococcus aureus.    Plans:  Continue antibiotic intravenous Ancef   Blood culture positive for MSSA.    DC'ed vancomycin.    Switch oral antibiotic upon discharge.  Patient was hypotensive and was given fluids per sepsis protocol by me yesterday and his vital signs improved.  Blood pressure and WBC count both improved.    Neck pain  Assessment &  "Plan  Patient has been complaining of severe neck and shoulder pain.  Neck pain is usually 9/10 in intensity that decreased to 7/10 in intensity after taking oxycodone 5 mg.  Had tried morphine and Dilaudid that did not work for him.  X-ray neck done  CT neck not significant.  Pain medicines: Tylenol scheduled, Flexeril, morphine as needed.    Lidocaine patch and ice application to the affected area.  Recommended to avoid oxycodone.  08/02: Toradol, gabapentin and IV Tylenol as needed ddded for neck pain.    Encounter for deep vein thrombosis (DVT) prophylaxis  Assessment & Plan  Start pharmacologic DVT prophylaxis utilizing heparin 5000 units SQ every 8 hours (07/29/2024, 6 AM).  Of note, patient has no complaints of calf pain, leg swelling, or pleurisy, to suggest either DVT or PE on admission date 07/28/2024.    Chronic diastolic CHF (congestive heart failure) (HCC)  Assessment & Plan  Wt Readings from Last 3 Encounters:   08/01/24 122 kg (268 lb 11.9 oz)   07/25/24 121 kg (267 lb 10.2 oz)   07/17/24 121 kg (266 lb 6.4 oz)       Was given fluids per sepsis protocol and has improved.  No evidence of volume overload  Per 07/05/2024 TTE: LV EF 62%, grade I LV  diastolic dysfunction.  RV systolic function normal.        Acute hyponatremia  Assessment & Plan  Lab Results   Component Value Date    SODIUM 136 08/01/2024    SODIUM 134 (L) 08/01/2024    SODIUM 135 07/31/2024    SODIUM 134 (L) 07/30/2024      Sodium back to normal range  Will monitor electrolytes    Acute hypoxic respiratory failure (HCC)  Assessment & Plan  Had a acute hypoxic respiratory failure on admission and needing 2 L of oxygen, patient seems to have significantly improved right now.  This is likely due to improvement of the sepsis and discontinue opioid medications.    Blood pressure 108/66, pulse 86, temperature 98.5 °F (36.9 °C), resp. rate 13, height 6' 4\" (1.93 m), weight 122 kg (268 lb 11.9 oz), SpO2 94%.      Aortic stenosis with trileaflet " valve  Assessment & Plan  Patient has no complaints of angina, syncope, or acute-on-chronic diastolic CHF on admitting date 07/28/2024.  Moderate aortic stenosis present.  Will need outpatient follow-up with cardiology        Follicular lymphoma grade I of lymph nodes of multiple sites (HCC)  Assessment & Plan  Patient has a past medical history of follicular B-cell lymphoma (diagnosed in 2020 by [facility] Dr. Naomi Cummings, [facility]), s/p chemotherapy x 6 months, ended in Feburary 2022, now in clinical remission.  Observe during the hospitalization.    FINA (acute kidney injury) on CKD stage 2([facility])  Assessment & Plan  Lab Results   Component Value Date    CREATININE 1.11 08/01/2024    CREATININE 0.97 08/01/2024    CREATININE 1.17 07/31/2024    CREATININE 1.20 07/30/2024      FINA on admission.  Most likely prerenal vs ATN.   FINA resolved.  Monitor kidney function    Hypercholesteremia  Assessment & Plan  Patient restarted on statins, Lipitor 80 mg as a substitute for Crestor 40 mg daily that he takes at home    Diabetes 1.5, managed as type 2 ([facility])  Assessment & Plan  Lab Results   Component Value Date    HGBA1C 6.8 (H) 07/05/2024       Recent Labs     07/31/24  1632 07/31/24  2039 08/01/24  0605 08/01/24  1056   POCGLU 137 146* 162* 146*         Blood Sugar Average: Last 72 hrs:  (P) [id number]    Continue carbohydrate consistent diet, lispro insulin sliding scale qac + qhs, and POC glucose qac + qhs               VTE Pharmacologic Prophylaxis: VTE Score: 3 Moderate Risk (Score 3-4) - Pharmacological DVT Prophylaxis Ordered: heparin.    Mobility:   Basic Mobility Inpatient Raw Score: 17  JH-HLM Goal: 5: Stand one or more mins  JH-HLM Achieved: 7: Walk 25 feet or more  JH-HLM Goal achieved. Continue to encourage appropriate mobility.    Patient Centered Rounds: I performed bedside rounds with nursing staff today.   Discussions with Specialists or Other Care Team Provider:      Education and Discussions with Family / Patient: Updated  (daughter) via phone. Left voicemail for daughter Sandi.    Total Time Spent on Date of Encounter in care of patient: 30 mins. This time was spent on one or more of the following: performing physical exam; counseling and coordination of care; obtaining or reviewing history; documenting in the medical record; reviewing/ordering tests, medications or procedures; communicating with other healthcare professionals and discussing with patient's family/caregivers.    Current Length of Stay: 4 day(s)  Current Patient Status: Inpatient   Certification Statement: The patient will continue to require additional inpatient hospital stay due to Sepsis secondary to foot ulcer and severe neck pain  Discharge Plan: Anticipate discharge tomorrow to home.    Code Status: Level 1 - Full Code    Subjective:   Patient seen and examined bedside this morning.  He was sitting in the recliner chair.  He said that he tried 1 dose of oxycodone 2.5 mg last night.  Still having left shoulder and neck pain.  He said he had tried a couple of medicines for the neck pain prior to admission.  He found that oxycodone 5 mg can help his pain.  Recommended to try gabapentin instead of oxycodone today.  He denied any fever, chills, chest pain, shortness of breath.  No other significant overnight event.    Objective:     Vitals:   Temp (24hrs), Av.1 °F (36.7 °C), Min:97.6 °F (36.4 °C), Max:98.5 °F (36.9 °C)    Temp:  [97.6 °F (36.4 °C)-98.5 °F (36.9 °C)] 97.6 °F (36.4 °C)  HR:  [78-86] 78  Resp:  [13-16] 16  BP: (108-139)/(66-85) 121/73  SpO2:  [94 %-98 %] 97 %  Body mass index is 32.55 kg/m².     Input and Output Summary (last 24 hours):     Intake/Output Summary (Last 24 hours) at 2024 1209  Last data filed at 2024 0900  Gross per 24 hour   Intake 600 ml   Output 300 ml   Net 300 ml       Physical Exam:   Physical Exam  Vitals and nursing note reviewed.    Constitutional:       General: He is not in acute distress.     Appearance: He is well-developed. He is obese.   HENT:      Head: Normocephalic and atraumatic.      Mouth/Throat:      Mouth: Mucous membranes are moist.      Pharynx: No oropharyngeal exudate.   Eyes:      Conjunctiva/sclera: Conjunctivae normal.      Pupils: Pupils are equal, round, and reactive to light.   Cardiovascular:      Rate and Rhythm: Normal rate and regular rhythm.      Heart sounds: Normal heart sounds. No murmur heard.  Pulmonary:      Effort: Pulmonary effort is normal. No respiratory distress.      Breath sounds: Normal breath sounds. No wheezing or rales.   Abdominal:      General: Bowel sounds are normal.      Palpations: Abdomen is soft.      Tenderness: There is no abdominal tenderness.   Musculoskeletal:         General: No swelling.      Cervical back: Neck supple.      Right lower leg: No edema.      Left lower leg: No edema.   Skin:     General: Skin is warm and dry.      Capillary Refill: Capillary refill takes less than 2 seconds.      Coloration: Skin is not jaundiced.      Findings: Lesion present.      Comments: Rt food ulcer wrapped in bandage   Neurological:      Mental Status: He is alert and oriented to person, place, and time.   Psychiatric:         Mood and Affect: Mood normal.          Additional Data:     Labs:  Results from last 7 days   Lab Units 08/02/24  0452 07/29/24  0412 07/28/24  2218   WBC Thousand/uL 6.13   < > 28.63*   HEMOGLOBIN g/dL 10.0*   < > 13.6   HEMATOCRIT % 32.5*   < > 43.9   PLATELETS Thousands/uL 197   < > 250   BANDS PCT %  --   --  3   LYMPHO PCT %  --   --  2*   MONO PCT %  --   --  5   EOS PCT %  --   --  0    < > = values in this interval not displayed.     Results from last 7 days   Lab Units 08/02/24  0452 07/29/24  0415 07/28/24  2218   SODIUM mmol/L 138   < > 133*   POTASSIUM mmol/L 4.3   < > 4.4   CHLORIDE mmol/L 100   < > 96   CO2 mmol/L 29   < > 21   BUN mg/dL 28*   < > 27*    CREATININE mg/dL 1.03   < > 1.18   ANION GAP mmol/L 9   < > 16*   CALCIUM mg/dL 9.5   < > 9.9   ALBUMIN g/dL  --   --  3.9   TOTAL BILIRUBIN mg/dL  --   --  0.66   ALK PHOS U/L  --   --  92   ALT U/L  --   --  43   AST U/L  --   --  29   GLUCOSE RANDOM mg/dL 139   < > 224*    < > = values in this interval not displayed.         Results from last 7 days   Lab Units 08/02/24  1101 08/02/24  0628 08/01/24  2144 08/01/24  1619 08/01/24  1056 08/01/24  0605 07/31/24  2039 07/31/24  1632 07/31/24  1115 07/31/24  0634 07/30/24  2103 07/30/24  1618   POC GLUCOSE mg/dl 150* 147* 130 161* 146* 162* 146* 137 152* 147* 157* 168*         Results from last 7 days   Lab Units 07/30/24  0507 07/29/24  2144 07/29/24  1805 07/29/24  0416 07/28/24  2245 07/28/24  2218   LACTIC ACID mmol/L  --  1.4 2.1* 1.0 1.3  --    PROCALCITONIN ng/ml 2.81*  --  3.81* 2.95*  --  0.59*       Lines/Drains:  Invasive Devices       Peripheral Intravenous Line  Duration             Peripheral IV 07/31/24 Dorsal (posterior);Right Hand 1 day                          Imaging: Reviewed radiology reports from this admission including: xray(s)  CT soft tissue neck w contrast   Final Result by Roderick Chamorro MD (08/01 0636)      The epiglottis appears within normal limits.      History of B-cell lymphoma status post chemotherapy. Small bilateral submandibular, jugular chain and posterior triangle lymph nodes which are below size criteria for pathologic adenopathy.      Workstation performed: [id number]         XR neck soft tissue   Final Result by Abdelrahman Guzman MD (07/31 1628)      The epiglottis appears questionably thickened. Depending on level of concern for epiglottitis, consider contrast-enhanced CT of the neck for further evaluation.      The study was marked in EPIC for immediate notification.      Workstation performed: [id number]         XR foot 3+ views RIGHT   Final Result by Tanesha Cartagena MD (07/29 1033)      No acute osseous  abnormality.   Chronic findings, as above.         Computerized Assisted Algorithm (CAA) may have been used to analyze all applicable images.         Workstation performed: [id number]         CTA ED chest PE Study   Final Result by Gabriel Woods MD (07/28 2338)      1.  No acute pulmonary embolism to the segmental level with evaluation of the more peripheral subsegmental branches limited by timing of contrast bolus and inadequate contrast opacification.   2.  Ectatic ascending thoracic aorta again noted measuring up to 4.6 cm in diameter, not significantly changed. No aortic dissection.   3.  Diffuse wall thickening of the left ventricle including interventricular septum noted suggesting left ventricular hypertrophy.   4.  No lobar airspace consolidation/pneumonia. No pleural or pericardial effusions.      Workstation performed: [id number]         XR chest 2 views   Final Result by Angela Montes De Oca MD (07/28 2300)      No acute cardiopulmonary disease.            Workstation performed: [id number]            Recent Cultures (last 7 days):   Results from last 7 days   Lab Units 07/29/24  0846 07/28/24  2339   BLOOD CULTURE   --  Staphylococcus aureus*  Staphylococcus aureus*   GRAM STAIN RESULT  No Polys or Bacteria seen Gram positive cocci in clusters*  Gram positive cocci in clusters*   WOUND CULTURE  3+ Growth of Staphylococcus aureus*  --        Last 24 Hours Medication List:   Current Facility-Administered Medications   Medication Dose Route Frequency Provider Last Rate    acetaminophen  1,000 mg Intravenous Q6H PRN Yogesh Stewart MD      amLODIPine  10 mg Oral Daily Irineo Pedersen MD      aspirin  81 mg Oral Daily Irineo Pedersen MD      atorvastatin  80 mg Oral Daily With Dinner Jessee Batres MD      cefazolin  2,000 mg Intravenous Q8H May Kalyn Reyes MD 2,000 mg (08/02/24 1124)    Diclofenac Sodium  2 g Topical 4x Daily May Kalyn Reyes MD      gabapentin  100 mg Oral TID May Kalyn Reyes MD      heparin  (porcine)  5,000 Units Subcutaneous Q8H Irineo Pedersen MD      HYDROmorphone  0.2 mg Intravenous Q2H PRN May Kalyn Reyes MD      insulin lispro  2-12 Units Subcutaneous TID AC May Kalyn Reyes MD      insulin lispro  2-12 Units Subcutaneous HS May Kalyn Reyes MD      ketorolac  15 mg Intravenous Q6H [facility] Yogesh Stewart MD      lidocaine  1 patch Topical Daily May Kalyn Reyes MD      losartan  100 mg Oral Daily Irineo Pedersen MD      metoprolol succinate  100 mg Oral QPM Irineo Pedersen MD      morphine injection  4 mg Intravenous Q4H PRN May Kalyn Reyes MD      nitroglycerin  0.4 mg Sublingual Q5 Min PRN Irineo Pedersen MD      oxyCODONE  2.5 mg Oral Q4H PRN May Kalyn Reyes MD      polyethylene glycol  17 g Oral Daily May Kalyn Reyes MD      senna  1 tablet Oral HS May Kalyn Reyes MD      tiZANidine  4 mg Oral TID Yogesh Stewart MD          Today, Patient Was Seen By: Fernanda Reyes MD    **Please Note: This note may have been constructed using a voice recognition system.**

## 2024-08-03 VITALS
OXYGEN SATURATION: 96 % | TEMPERATURE: 97.6 F | HEART RATE: 69 BPM | HEIGHT: 76 IN | RESPIRATION RATE: 16 BRPM | BODY MASS INDEX: 32.62 KG/M2 | SYSTOLIC BLOOD PRESSURE: 145 MMHG | WEIGHT: 267.86 LBS | DIASTOLIC BLOOD PRESSURE: 83 MMHG

## 2024-08-03 LAB
ANION GAP SERPL CALCULATED.3IONS-SCNC: 8 MMOL/L (ref 4–13)
BUN SERPL-MCNC: 32 MG/DL (ref 5–25)
CALCIUM SERPL-MCNC: 9.6 MG/DL (ref 8.4–10.2)
CHLORIDE SERPL-SCNC: 101 MMOL/L (ref 96–108)
CO2 SERPL-SCNC: 27 MMOL/L (ref 21–32)
CREAT SERPL-MCNC: 0.95 MG/DL (ref 0.6–1.3)
ERYTHROCYTE [DISTWIDTH] IN BLOOD BY AUTOMATED COUNT: 16.8 % (ref 11.6–15.1)
GFR SERPL CREATININE-BSD FRML MDRD: 82 ML/MIN/1.73SQ M
GLUCOSE SERPL-MCNC: 126 MG/DL (ref 65–140)
GLUCOSE SERPL-MCNC: 142 MG/DL (ref 65–140)
GLUCOSE SERPL-MCNC: 176 MG/DL (ref 65–140)
HCT VFR BLD AUTO: 34.3 % (ref 36.5–49.3)
HGB BLD-MCNC: 10.5 G/DL (ref 12–17)
MCH RBC QN AUTO: 25.1 PG (ref 26.8–34.3)
MCHC RBC AUTO-ENTMCNC: 30.6 G/DL (ref 31.4–37.4)
MCV RBC AUTO: 82 FL (ref 82–98)
PLATELET # BLD AUTO: 235 THOUSANDS/UL (ref 149–390)
PMV BLD AUTO: 10.5 FL (ref 8.9–12.7)
POTASSIUM SERPL-SCNC: 4.2 MMOL/L (ref 3.5–5.3)
RBC # BLD AUTO: 4.18 MILLION/UL (ref 3.88–5.62)
SODIUM SERPL-SCNC: 136 MMOL/L (ref 135–147)
WBC # BLD AUTO: 6.73 THOUSAND/UL (ref 4.31–10.16)

## 2024-08-03 PROCEDURE — 82948 REAGENT STRIP/BLOOD GLUCOSE: CPT

## 2024-08-03 PROCEDURE — 80048 BASIC METABOLIC PNL TOTAL CA: CPT

## 2024-08-03 PROCEDURE — 85027 COMPLETE CBC AUTOMATED: CPT

## 2024-08-03 RX ORDER — KETOROLAC TROMETHAMINE 10 MG/1
5 TABLET, FILM COATED ORAL EVERY 6 HOURS PRN
Qty: 10 TABLET | Refills: 0 | Status: SHIPPED | OUTPATIENT
Start: 2024-08-03 | End: 2024-08-09 | Stop reason: SDUPTHER

## 2024-08-03 RX ADMIN — POLYETHYLENE GLYCOL 3350 17 G: 17 POWDER, FOR SOLUTION ORAL at 08:24

## 2024-08-03 RX ADMIN — INSULIN LISPRO 2 UNITS: 100 INJECTION, SOLUTION INTRAVENOUS; SUBCUTANEOUS at 11:25

## 2024-08-03 RX ADMIN — Medication 2 G: at 08:26

## 2024-08-03 RX ADMIN — AMLODIPINE BESYLATE 10 MG: 10 TABLET ORAL at 08:23

## 2024-08-03 RX ADMIN — KETOROLAC TROMETHAMINE 15 MG: 30 INJECTION, SOLUTION INTRAMUSCULAR; INTRAVENOUS at 05:25

## 2024-08-03 RX ADMIN — Medication 2.5 MG: at 12:44

## 2024-08-03 RX ADMIN — LOSARTAN POTASSIUM 100 MG: 50 TABLET, FILM COATED ORAL at 08:23

## 2024-08-03 RX ADMIN — CEFAZOLIN SODIUM 2000 MG: 2 SOLUTION INTRAVENOUS at 02:42

## 2024-08-03 RX ADMIN — CEFAZOLIN SODIUM 2000 MG: 2 SOLUTION INTRAVENOUS at 11:24

## 2024-08-03 RX ADMIN — Medication 2.5 MG: at 02:42

## 2024-08-03 RX ADMIN — ASPIRIN 81 MG: 81 TABLET, CHEWABLE ORAL at 08:23

## 2024-08-03 RX ADMIN — HEPARIN SODIUM 5000 UNITS: 5000 INJECTION INTRAVENOUS; SUBCUTANEOUS at 05:26

## 2024-08-03 RX ADMIN — TIZANIDINE 4 MG: 2 TABLET ORAL at 08:23

## 2024-08-03 RX ADMIN — GABAPENTIN 100 MG: 100 CAPSULE ORAL at 08:23

## 2024-08-03 NOTE — ASSESSMENT & PLAN NOTE
Likely secondary to diabetic foot wound  Wound culture growing staph aureus  Completed 7 days of IV antibiotics  Sepsis resolved  Stable for discharge off antibiotics

## 2024-08-03 NOTE — PLAN OF CARE
Problem: Prexisting or High Potential for Compromised Skin Integrity  Goal: Skin integrity is maintained or improved  Description: INTERVENTIONS:  - Identify patients at risk for skin breakdown  - Assess and monitor skin integrity  - Assess and monitor nutrition and hydration status  - Monitor labs   - Assess for incontinence   - Turn and reposition patient  - Assist with mobility/ambulation  - Relieve pressure over bony prominences  - Avoid friction and shearing  - Provide appropriate hygiene as needed including keeping skin clean and dry  - Evaluate need for skin moisturizer/barrier cream  - Collaborate with interdisciplinary team   - Patient/family teaching  - Consider wound care consult   8/3/2024 1713 by Marie Stern  Outcome: Adequate for Discharge  8/3/2024 0925 by Marie Stern  Outcome: Progressing     Problem: PAIN - ADULT  Goal: Verbalizes/displays adequate comfort level or baseline comfort level  Description: Interventions:  - Encourage patient to monitor pain and request assistance  - Assess pain using appropriate pain scale  - Administer analgesics based on type and severity of pain and evaluate response  - Implement non-pharmacological measures as appropriate and evaluate response  - Consider cultural and social influences on pain and pain management  - Notify physician/advanced practitioner if interventions unsuccessful or patient reports new pain  8/3/2024 1713 by Marie Stern  Outcome: Adequate for Discharge  8/3/2024 0925 by Marie Stern  Outcome: Progressing     Problem: INFECTION - ADULT  Goal: Absence or prevention of progression during hospitalization  Description: INTERVENTIONS:  - Assess and monitor for signs and symptoms of infection  - Monitor lab/diagnostic results  - Monitor all insertion sites, i.e. indwelling lines, tubes, and drains  - Monitor endotracheal if appropriate and nasal secretions for changes in amount and color  - [place] appropriate cooling/warming  therapies per order  - Administer medications as ordered  - Instruct and encourage patient and family to use good hand hygiene technique  - Identify and instruct in appropriate isolation precautions for identified infection/condition  8/3/2024 1713 by Marie Stern  Outcome: Adequate for Discharge  8/3/2024 0925 by Marie Stern  Outcome: Progressing     Problem: SAFETY ADULT  Goal: Patient will remain free of falls  Description: INTERVENTIONS:  - Educate patient/family on patient safety including physical limitations  - Instruct patient to call for assistance with activity   - Consult OT/PT to assist with strengthening/mobility   - Keep Call bell within reach  - Keep bed low and locked with side rails adjusted as appropriate  - Keep care items and personal belongings within reach  - Initiate and maintain comfort rounds  - Make Fall Risk Sign visible to staff  - Offer Toileting every  Hours, in advance of need  - Initiate/Maintain alarm  - Obtain necessary fall risk management equipment:   - Apply yellow socks and bracelet for high fall risk patients  - Consider moving patient to room near nurses station  8/3/2024 1713 by Marie Stern  Outcome: Adequate for Discharge  8/3/2024 0925 by Marie Stern  Outcome: Progressing  Goal: Maintain or return to baseline ADL function  Description: INTERVENTIONS:  -  Assess patient's ability to carry out ADLs; assess patient's baseline for ADL function and identify physical deficits which impact ability to perform ADLs (bathing, care of mouth/teeth, toileting, grooming, dressing, etc.)  - Assess/evaluate cause of self-care deficits   - Assess range of motion  - Assess patient's mobility; develop plan if impaired  - Assess patient's need for assistive devices and provide as appropriate  - Encourage maximum independence but intervene and supervise when necessary  - Involve family in performance of ADLs  - Assess for home care needs following discharge   - Consider  OT consult to assist with ADL evaluation and planning for discharge  - Provide patient education as appropriate  8/3/2024 1713 by Marie Stern  Outcome: Adequate for Discharge  8/3/2024 0925 by Marie Stern  Outcome: Progressing     Problem: DISCHARGE PLANNING  Goal: Discharge to home or other facility with appropriate resources  Description: INTERVENTIONS:  - Identify barriers to discharge w/patient and caregiver  - Arrange for needed discharge resources and transportation as appropriate  - Identify discharge learning needs (meds, wound care, etc.)  - Arrange for interpretive services to assist at discharge as needed  - Refer to Case Management Department for coordinating discharge planning if the patient needs post-hospital services based on physician/advanced practitioner order or complex needs related to functional status, cognitive ability, or social support system  8/3/2024 1713 by Marie Stern  Outcome: Adequate for Discharge  8/3/2024 0925 by Marie Stern  Outcome: Progressing     Problem: Knowledge Deficit  Goal: Patient/family/caregiver demonstrates understanding of disease process, treatment plan, medications, and discharge instructions  Description: Complete learning assessment and assess knowledge base.  Interventions:  - Provide teaching at level of understanding  - Provide teaching via preferred learning methods  8/3/2024 1713 by Marie Stern  Outcome: Adequate for Discharge  8/3/2024 0925 by Marie Stern  Outcome: Progressing     Problem: MUSCULOSKELETAL - ADULT  Goal: Maintain or return mobility to safest level of function  Description: INTERVENTIONS:  - Assess patient's ability to carry out ADLs; assess patient's baseline for ADL function and identify physical deficits which impact ability to perform ADLs (bathing, care of mouth/teeth, toileting, grooming, dressing, etc.)  - Assess/evaluate cause of self-care deficits   - Assess range of motion  - Assess patient's  mobility  - Assess patient's need for assistive devices and provide as appropriate  - Encourage maximum independence but intervene and supervise when necessary  - Involve family in performance of ADLs  - Assess for home care needs following discharge   - Consider OT consult to assist with ADL evaluation and planning for discharge  - Provide patient education as appropriate  8/3/2024 1713 by Marie Stern  Outcome: Adequate for Discharge  8/3/2024 0925 by Marie Stern  Outcome: Progressing  Goal: Maintain proper alignment of affected body part  Description: INTERVENTIONS:  - Support, maintain and protect limb and body alignment  - Provide patient/ family with appropriate education  8/3/2024 1713 by Marie Stern  Outcome: Adequate for Discharge  8/3/2024 0925 by Marie Stern  Outcome: Progressing     Problem: Nutrition/Hydration-ADULT  Goal: Nutrient/Hydration intake appropriate for improving, restoring or maintaining nutritional needs  Description: Monitor and assess patient's nutrition/hydration status for malnutrition. Collaborate with interdisciplinary team and initiate plan and interventions as ordered.  Monitor patient's weight and dietary intake as ordered or per policy. Utilize nutrition screening tool and intervene as necessary. Determine patient's food preferences and provide high-protein, high-caloric foods as appropriate.     INTERVENTIONS:  - Monitor oral intake, urinary output, labs, and treatment plans  - Assess nutrition and hydration status and recommend course of action  - Evaluate amount of meals eaten  - Assist patient with eating if necessary   - Allow adequate time for meals  - Recommend/ encourage appropriate diets, oral nutritional supplements, and vitamin/mineral supplements  - Order, calculate, and assess calorie counts as needed  - Recommend, monitor, and adjust tube feedings and TPN/PPN based on assessed needs  - Assess need for intravenous fluids  - Provide specific  nutrition/hydration education as appropriate  - Include patient/family/caregiver in decisions related to nutrition  8/3/2024 1713 by Marie Stern  Outcome: Adequate for Discharge  8/3/2024 0925 by Marie Stern  Outcome: Progressing     Problem: PHYSICAL THERAPY ADULT  Goal: Performs mobility at highest level of function for planned discharge setting.  See evaluation for individualized goals.  Description: Treatment/Interventions: Functional transfer training, LE strengthening/ROM, Therapeutic exercise, Endurance training, Patient/family training, Bed mobility, Continued evaluation, Spoke to nursing, OT, Family          See flowsheet documentation for full assessment, interventions and recommendations.  Outcome: Adequate for Discharge     Problem: OCCUPATIONAL THERAPY ADULT  Goal: Performs self-care activities at highest level of function for planned discharge setting.  See evaluation for individualized goals.  Description  Outcome: Adequate for Discharge

## 2024-08-03 NOTE — ASSESSMENT & PLAN NOTE
Likely musculoskeletal pain  Has followed with orthopedic surgery in the past  Was told, per patient report, that pain is secondary to arthritis  Does have full range of motion  Improved with toradol and tylenol  Prescribe tylenol and zanaflex on discharge  Provide small course of toradol for acute pain  Creatinine can tolerate based on GFR

## 2024-08-03 NOTE — CASE MANAGEMENT
Case Management Discharge Planning Note    Patient name Augustus Coffman  Location /-01 MRN [id number]  : 1955 Date 8/3/2024       Current Admission Date: 2024  Current Admission Diagnosis:Sepsis without acute organ dysfunction ([facility])   Patient Active Problem List    Diagnosis Date Noted Date Diagnosed    Neck pain 2024     Sepsis without acute organ dysfunction ([facility]) 2024     Acute hypoxic respiratory failure ([facility]) 2024     Acute hyponatremia 2024     Chronic diastolic CHF (congestive heart failure) ([facility]) 2024     Encounter for deep vein thrombosis (DVT) prophylaxis 2024     Hyponatremia 2024     Aortic stenosis with trileaflet valve 07/15/2024     Ectatic thoracic aorta (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) 10/09/2023     Vitamin D deficiency 2023     Stage 3a chronic kidney disease ([facility]) 2023     Follicular lymphoma grade I of lymph nodes of multiple sites ([facility]) 08/15/2022     Obesity, morbid ([facility]) 2022     Other proteinuria 2022     Stage 2 chronic kidney disease 09/15/2021     Hypertensive kidney disease with stage 2 chronic kidney disease 09/15/2021     Rash 2021     Chronic venous hypertension (idiopathic) with ulcer of right lower extremity ([facility]) 2021     GI bleed 2021     Pleural effusion 2021     Heart murmur 2021     Retroperitoneal hematoma 2021     Mesenteric lymphadenopathy 2021     Acute blood loss anemia 2021     FINA (acute kidney injury) on CKD stage 2(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) 10/06/2016     Hypertension 10/06/2016     Hypercholesteremia 10/06/2016     Diabetic peripheral neuropathy ([facility]) 11/10/2014     Gout  12/06/2007     Type 2 diabetes mellitus (HCC) 12/06/2007       LOS (days): 5  Geometric Mean LOS (GMLOS) (days): 5.1  Days to GMLOS:-0.5     OBJECTIVE:  Risk of Unplanned Readmission Score: 16.78         Current admission status: Inpatient   [facility] Pharmacy:   [id number] [address]  [address] [facility] [address]  Phone: [phone] Fax: [phone]    [id number] [address] [place] [address]  Phone: [phone] Fax: [phone]    Primary Care Provider: Gwen Lazo DO    Primary Insurance: MEDICARE  Secondary Insurance: [facility] BLUE SHIELD    DISCHARGE DETAILS:    Discharge planning discussed with:: Pt and dgt Lauren     Comments - Freedom of Choice: CM reviewed accepting HHC list and pt has chosen [facility].  [facility] reserved.  Family will transport home.  Now declining STR.  IMM reviewed and signed by pt.  Copy to pt and copy to MR for scanning  CM contacted family/caregiver?: Yes  Were Treatment Team discharge recommendations reviewed with patient/caregiver?: Yes  Did patient/caregiver verbalize understanding of patient care needs?: Yes  Were patient/caregiver advised of the risks associated with not following Treatment Team discharge recommendations?: Yes    Contacts  Patient Contacts: Lauren  Relationship to Patient:: Family  Contact Method: In Person  Reason/Outcome: Discharge Planning    Requested Home Health Care         Is the patient interested in HHC at discharge?: Yes  Home Health Discipline requested:: Home Health Aide, Nursing, Occupational Therapy, Physical Therapy  Home Health Agency Name:: Other ([facility])  HHA External Referral Reason (only applicable if external HHA name selected): Patient has established relationship with provider  Home Health Follow-Up Provider:: PCP  Home Health Services Needed:: Evaluate Functional Status and Safety,  Strengthening/Theraputic Exercises to Improve Function  Homebound Criteria Met:: Uses an Assist Device (i.e. cane, walker, etc), Requires the Assistance of Another Person for Safe Ambulation or to Leave the Home  Supporting Clincal Findings:: Fatigues Easliy in Short Distances, Limited Endurance    DME Referral Provided  Referral made for DME?: No    Other Referral/Resources/Interventions Provided:  Interventions: C  Referral Comments: Declining STR.  [facility] reserved per pt request    Would you like to participate in our [facility] service program?  : No - Declined    Treatment Team Recommendation: Home with Home Health Care  Discharge Destination Plan:: Acute Hospital Transfer  Transport at Discharge : Family                             IMM Given (Date):: 08/03/24  IMM Given to:: Patient

## 2024-08-03 NOTE — ASSESSMENT & PLAN NOTE
Lab Results   Component Value Date    CREATININE 0.95 08/03/2024    CREATININE 1.03 08/02/2024    CREATININE 1.11 08/01/2024    CREATININE 0.97 08/01/2024      FINA on admission.  Most likely prerenal vs ATN.   FINA resolved.

## 2024-08-03 NOTE — PLAN OF CARE
Problem: Prexisting or High Potential for Compromised Skin Integrity  Goal: Skin integrity is maintained or improved  Description: INTERVENTIONS:  - Identify patients at risk for skin breakdown  - Assess and monitor skin integrity  - Assess and monitor nutrition and hydration status  - Monitor labs   - Assess for incontinence   - Turn and reposition patient  - Assist with mobility/ambulation  - Relieve pressure over bony prominences  - Avoid friction and shearing  - Provide appropriate hygiene as needed including keeping skin clean and dry  - Evaluate need for skin moisturizer/barrier cream  - Collaborate with interdisciplinary team   - Patient/family teaching  - Consider wound care consult   Outcome: Progressing     Problem: PAIN - ADULT  Goal: Verbalizes/displays adequate comfort level or baseline comfort level  Description: Interventions:  - Encourage patient to monitor pain and request assistance  - Assess pain using appropriate pain scale  - Administer analgesics based on type and severity of pain and evaluate response  - Implement non-pharmacological measures as appropriate and evaluate response  - Consider cultural and social influences on pain and pain management  - Notify physician/advanced practitioner if interventions unsuccessful or patient reports new pain  Outcome: Progressing     Problem: INFECTION - ADULT  Goal: Absence or prevention of progression during hospitalization  Description: INTERVENTIONS:  - Assess and monitor for signs and symptoms of infection  - Monitor lab/diagnostic results  - Monitor all insertion sites, i.e. indwelling lines, tubes, and drains  - Monitor endotracheal if appropriate and nasal secretions for changes in amount and color  - [place] appropriate cooling/warming therapies per order  - Administer medications as ordered  - Instruct and encourage patient and family to use good hand hygiene technique  - Identify and instruct in appropriate isolation precautions for  identified infection/condition  Outcome: Progressing     Problem: SAFETY ADULT  Goal: Patient will remain free of falls  Description: INTERVENTIONS:  - Educate patient/family on patient safety including physical limitations  - Instruct patient to call for assistance with activity   - Consult OT/PT to assist with strengthening/mobility   - Keep Call bell within reach  - Keep bed low and locked with side rails adjusted as appropriate  - Keep care items and personal belongings within reach  - Initiate and maintain comfort rounds  - Make Fall Risk Sign visible to staff  - Offer Toileting every 3 Hours, in advance of need  - Initiate/Maintain bed alarm  - Obtain necessary fall risk management equipment:   - Apply yellow socks and bracelet for high fall risk patients  - Consider moving patient to room near nurses station  Outcome: Progressing  Goal: Maintain or return to baseline ADL function  Description: INTERVENTIONS:  -  Assess patient's ability to carry out ADLs; assess patient's baseline for ADL function and identify physical deficits which impact ability to perform ADLs (bathing, care of mouth/teeth, toileting, grooming, dressing, etc.)  - Assess/evaluate cause of self-care deficits   - Assess range of motion  - Assess patient's mobility; develop plan if impaired  - Assess patient's need for assistive devices and provide as appropriate  - Encourage maximum independence but intervene and supervise when necessary  - Involve family in performance of ADLs  - Assess for home care needs following discharge   - Consider OT consult to assist with ADL evaluation and planning for discharge  - Provide patient education as appropriate  Outcome: Progressing     Problem: DISCHARGE PLANNING  Goal: Discharge to home or other facility with appropriate resources  Description: INTERVENTIONS:  - Identify barriers to discharge w/patient and caregiver  - Arrange for needed discharge resources and transportation as appropriate  -  Identify discharge learning needs (meds, wound care, etc.)  - Arrange for interpretive services to assist at discharge as needed  - Refer to Case Management Department for coordinating discharge planning if the patient needs post-hospital services based on physician/advanced practitioner order or complex needs related to functional status, cognitive ability, or social support system  Outcome: Progressing     Problem: Knowledge Deficit  Goal: Patient/family/caregiver demonstrates understanding of disease process, treatment plan, medications, and discharge instructions  Description: Complete learning assessment and assess knowledge base.  Interventions:  - Provide teaching at level of understanding  - Provide teaching via preferred learning methods  Outcome: Progressing     Problem: MUSCULOSKELETAL - ADULT  Goal: Maintain or return mobility to safest level of function  Description: INTERVENTIONS:  - Assess patient's ability to carry out ADLs; assess patient's baseline for ADL function and identify physical deficits which impact ability to perform ADLs (bathing, care of mouth/teeth, toileting, grooming, dressing, etc.)  - Assess/evaluate cause of self-care deficits   - Assess range of motion  - Assess patient's mobility  - Assess patient's need for assistive devices and provide as appropriate  - Encourage maximum independence but intervene and supervise when necessary  - Involve family in performance of ADLs  - Assess for home care needs following discharge   - Consider OT consult to assist with ADL evaluation and planning for discharge  - Provide patient education as appropriate  Outcome: Progressing  Goal: Maintain proper alignment of affected body part  Description: INTERVENTIONS:  - Support, maintain and protect limb and body alignment  - Provide patient/ family with appropriate education  Outcome: Progressing     Problem: Nutrition/Hydration-ADULT  Goal: Nutrient/Hydration intake appropriate for improving,  restoring or maintaining nutritional needs  Description: Monitor and assess patient's nutrition/hydration status for malnutrition. Collaborate with interdisciplinary team and initiate plan and interventions as ordered.  Monitor patient's weight and dietary intake as ordered or per policy. Utilize nutrition screening tool and intervene as necessary. Determine patient's food preferences and provide high-protein, high-caloric foods as appropriate.     INTERVENTIONS:  - Monitor oral intake, urinary output, labs, and treatment plans  - Assess nutrition and hydration status and recommend course of action  - Evaluate amount of meals eaten  - Assist patient with eating if necessary   - Allow adequate time for meals  - Recommend/ encourage appropriate diets, oral nutritional supplements, and vitamin/mineral supplements  - Order, calculate, and assess calorie counts as needed  - Recommend, monitor, and adjust tube feedings and TPN/PPN based on assessed needs  - Assess need for intravenous fluids  - Provide specific nutrition/hydration education as appropriate  - Include patient/family/caregiver in decisions related to nutrition  Outcome: Progressing

## 2024-08-03 NOTE — ASSESSMENT & PLAN NOTE
Lab Results   Component Value Date    HGBA1C 6.8 (H) 07/05/2024       Recent Labs     08/02/24  1551 08/02/24  2113 08/03/24  0637 08/03/24  1110   POCGLU 146* 188* 142* 176*         Blood Sugar Average: Last 72 hrs:  (P) [id number]    Adequately controlled  Continue home regimen on discharge

## 2024-08-03 NOTE — PLAN OF CARE
Problem: Prexisting or High Potential for Compromised Skin Integrity  Goal: Skin integrity is maintained or improved  Description: INTERVENTIONS:  - Identify patients at risk for skin breakdown  - Assess and monitor skin integrity  - Assess and monitor nutrition and hydration status  - Monitor labs   - Assess for incontinence   - Turn and reposition patient  - Assist with mobility/ambulation  - Relieve pressure over bony prominences  - Avoid friction and shearing  - Provide appropriate hygiene as needed including keeping skin clean and dry  - Evaluate need for skin moisturizer/barrier cream  - Collaborate with interdisciplinary team   - Patient/family teaching  - Consider wound care consult   Outcome: Progressing     Problem: PAIN - ADULT  Goal: Verbalizes/displays adequate comfort level or baseline comfort level  Description: Interventions:  - Encourage patient to monitor pain and request assistance  - Assess pain using appropriate pain scale  - Administer analgesics based on type and severity of pain and evaluate response  - Implement non-pharmacological measures as appropriate and evaluate response  - Consider cultural and social influences on pain and pain management  - Notify physician/advanced practitioner if interventions unsuccessful or patient reports new pain  Outcome: Progressing     Problem: INFECTION - ADULT  Goal: Absence or prevention of progression during hospitalization  Description: INTERVENTIONS:  - Assess and monitor for signs and symptoms of infection  - Monitor lab/diagnostic results  - Monitor all insertion sites, i.e. indwelling lines, tubes, and drains  - Monitor endotracheal if appropriate and nasal secretions for changes in amount and color  - [place] appropriate cooling/warming therapies per order  - Administer medications as ordered  - Instruct and encourage patient and family to use good hand hygiene technique  - Identify and instruct in appropriate isolation precautions for  identified infection/condition  Outcome: Progressing     Problem: SAFETY ADULT  Goal: Patient will remain free of falls  Description: INTERVENTIONS:  - Educate patient/family on patient safety including physical limitations  - Instruct patient to call for assistance with activity   - Consult OT/PT to assist with strengthening/mobility   - Keep Call bell within reach  - Keep bed low and locked with side rails adjusted as appropriate  - Keep care items and personal belongings within reach  - Initiate and maintain comfort rounds  - Make Fall Risk Sign visible to staff  - Offer Toileting every  Hours, in advance of need  - Initiate/Maintain alarm  - Obtain necessary fall risk management equipment  - Apply yellow socks and bracelet for high fall risk patients  - Consider moving patient to room near nurses station  Outcome: Progressing  Goal: Maintain or return to baseline ADL function  Description: INTERVENTIONS:  -  Assess patient's ability to carry out ADLs; assess patient's baseline for ADL function and identify physical deficits which impact ability to perform ADLs (bathing, care of mouth/teeth, toileting, grooming, dressing, etc.)  - Assess/evaluate cause of self-care deficits   - Assess range of motion  - Assess patient's mobility; develop plan if impaired  - Assess patient's need for assistive devices and provide as appropriate  - Encourage maximum independence but intervene and supervise when necessary  - Involve family in performance of ADLs  - Assess for home care needs following discharge   - Consider OT consult to assist with ADL evaluation and planning for discharge  - Provide patient education as appropriate  Outcome: Progressing     Problem: DISCHARGE PLANNING  Goal: Discharge to home or other facility with appropriate resources  Description: INTERVENTIONS:  - Identify barriers to discharge w/patient and caregiver  - Arrange for needed discharge resources and transportation as appropriate  - Identify  discharge learning needs (meds, wound care, etc.)  - Arrange for interpretive services to assist at discharge as needed  - Refer to Case Management Department for coordinating discharge planning if the patient needs post-hospital services based on physician/advanced practitioner order or complex needs related to functional status, cognitive ability, or social support system  Outcome: Progressing     Problem: Knowledge Deficit  Goal: Patient/family/caregiver demonstrates understanding of disease process, treatment plan, medications, and discharge instructions  Description: Complete learning assessment and assess knowledge base.  Interventions:  - Provide teaching at level of understanding  - Provide teaching via preferred learning methods  Outcome: Progressing     Problem: MUSCULOSKELETAL - ADULT  Goal: Maintain or return mobility to safest level of function  Description: INTERVENTIONS:  - Assess patient's ability to carry out ADLs; assess patient's baseline for ADL function and identify physical deficits which impact ability to perform ADLs (bathing, care of mouth/teeth, toileting, grooming, dressing, etc.)  - Assess/evaluate cause of self-care deficits   - Assess range of motion  - Assess patient's mobility  - Assess patient's need for assistive devices and provide as appropriate  - Encourage maximum independence but intervene and supervise when necessary  - Involve family in performance of ADLs  - Assess for home care needs following discharge   - Consider OT consult to assist with ADL evaluation and planning for discharge  - Provide patient education as appropriate  Outcome: Progressing  Goal: Maintain proper alignment of affected body part  Description: INTERVENTIONS:  - Support, maintain and protect limb and body alignment  - Provide patient/ family with appropriate education  Outcome: Progressing     Problem: Nutrition/Hydration-ADULT  Goal: Nutrient/Hydration intake appropriate for improving, restoring or  maintaining nutritional needs  Description: Monitor and assess patient's nutrition/hydration status for malnutrition. Collaborate with interdisciplinary team and initiate plan and interventions as ordered.  Monitor patient's weight and dietary intake as ordered or per policy. Utilize nutrition screening tool and intervene as necessary. Determine patient's food preferences and provide high-protein, high-caloric foods as appropriate.     INTERVENTIONS:  - Monitor oral intake, urinary output, labs, and treatment plans  - Assess nutrition and hydration status and recommend course of action  - Evaluate amount of meals eaten  - Assist patient with eating if necessary   - Allow adequate time for meals  - Recommend/ encourage appropriate diets, oral nutritional supplements, and vitamin/mineral supplements  - Order, calculate, and assess calorie counts as needed  - Recommend, monitor, and adjust tube feedings and TPN/PPN based on assessed needs  - Assess need for intravenous fluids  - Provide specific nutrition/hydration education as appropriate  - Include patient/family/caregiver in decisions related to nutrition  Outcome: Progressing

## 2024-08-05 ENCOUNTER — TRANSITIONAL CARE MANAGEMENT (OUTPATIENT)
Dept: FAMILY MEDICINE CLINIC | Facility: CLINIC | Age: 69
End: 2024-08-05

## 2024-08-05 DIAGNOSIS — M25.511 RIGHT SHOULDER PAIN: ICD-10-CM

## 2024-08-05 RX ORDER — OXYCODONE HYDROCHLORIDE 5 MG/1
5 TABLET ORAL EVERY 6 HOURS PRN
Qty: 7 TABLET | Refills: 0 | OUTPATIENT
Start: 2024-08-05

## 2024-08-05 NOTE — TELEPHONE ENCOUNTER
[facility] call to inform PCP that pt has been out of the hospital since Saturday in so much shoulder and neck pain he was sent home with 5 mg OXY this is now finish can PCP please send in enough meds to hold pat until next office visit next Monday. Please call Tanna (gwen) at [phone]

## 2024-08-06 ENCOUNTER — TELEPHONE (OUTPATIENT)
Age: 69
End: 2024-08-06

## 2024-08-06 NOTE — TELEPHONE ENCOUNTER
Medications were denied refill per Dr Lazo. Spoke with the nurse from [facility] and let her know that Dr lazo will not be refilling the medications. They can discuss at the follow up Monday. In the meantime he was discharged with milder pain medications and lidocaine patches. Was instructed to continue to use these.

## 2024-08-08 ENCOUNTER — OFFICE VISIT (OUTPATIENT)
Dept: WOUND CARE | Facility: CLINIC | Age: 69
End: 2024-08-08
Payer: MEDICARE

## 2024-08-08 VITALS
SYSTOLIC BLOOD PRESSURE: 158 MMHG | HEART RATE: 67 BPM | DIASTOLIC BLOOD PRESSURE: 72 MMHG | TEMPERATURE: 96.8 F | RESPIRATION RATE: 18 BRPM

## 2024-08-08 DIAGNOSIS — L97.512 DIABETIC ULCER OF OTHER PART OF RIGHT FOOT ASSOCIATED WITH DIABETES MELLITUS DUE TO UNDERLYING CONDITION, WITH FAT LAYER EXPOSED (HCC): Primary | ICD-10-CM

## 2024-08-08 DIAGNOSIS — E08.621 DIABETIC ULCER OF OTHER PART OF RIGHT FOOT ASSOCIATED WITH DIABETES MELLITUS DUE TO UNDERLYING CONDITION, WITH FAT LAYER EXPOSED (HCC): Primary | ICD-10-CM

## 2024-08-08 PROCEDURE — 97597 DBRDMT OPN WND 1ST 20 CM/<: CPT | Performed by: PODIATRIST

## 2024-08-08 RX ORDER — LIDOCAINE 40 MG/G
CREAM TOPICAL ONCE
Status: COMPLETED | OUTPATIENT
Start: 2024-08-08 | End: 2024-08-08

## 2024-08-08 RX ADMIN — LIDOCAINE: 40 CREAM TOPICAL at 14:17

## 2024-08-08 NOTE — PROGRESS NOTES
Wound Procedure Treatment Diabetic Ulcer Right;Plantar;Posterior Foot    Performed by: Maryana Bocanegra RN  Authorized by: Susie Luu DPM    Associated wounds:   Wound 09/07/23 Diabetic Ulcer Foot Right;Plantar;Posterior  Applied primary dressing:  Other  Applied secondary dressing:  ABD, Gauze and Cast padding  Dressing secured with:  Julisa and Tape    Mepilex UP

## 2024-08-08 NOTE — PROGRESS NOTES
Patient ID: Augustus Coffman is a 68 y.o. male Date of Birth 1955       Chief Complaint   Patient presents with    Follow Up Wound Care Visit     RIGHT FOOT WOUND       Allergies:  Lisinopril    Diagnosis:  1. 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 Diabetic ulcer of other part of right foot associated with diabetes mellitus due to underlying condition, with fat layer exposed (HCC)  E08.621 lidocaine (LMX) 4 % cream    L97.512 Wound cleansing and dressings Diabetic Ulcer Right;Plantar;Posterior Foot     Wound Procedure Treatment Diabetic Ulcer Right;Plantar;Posterior Foot           Assessment & Plan:  See wound orders.    The patient has done with with treatment of the sepsis and staying off the foot.  The wound size and bed has improved drastically.  Will continue limited walking and local wound care as ordered.  Will try to get the wound to a small enough size to fit him for an off-loading orthotics.  At this point given his current health and complications with his wife's health, will try to avoid any surgical intervention for now.  Follow up one week.    Subjective:   The patient was recently hospitalized with sepsis.  Although there were no signs of infection, left foot culture did reveal MSSA.  The patient has been off his foot due to fatigue and ongoing shoulder and neck pain.  [facility] have helped with wound care at home.        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HCC)    Hypertension    Hypercholesteremia    Hammer toe of right foot     Stasis dermatitis of both legs    Diabetic peripheral neuropathy (HCC)    Gout    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HCC)    Obesity with body mass index 30 or greater    Type 2 diabetes mellitus (HCC)    Retroperitoneal hematoma    Mesenteric lymphadenopathy    Acute blood loss anemia    FINA (acute kidney injury) on CKD stage 2(HCC)    Heart murmur    GI bleed    Pleural effusion    Chronic venous hypertension (idiopathic) with ulcer of right lower extremity (HCC)    Rash    Stage 2 chronic kidney disease    Hypertensive kidney disease with stage 2 chronic kidney disease    Other proteinuria    Obesity, morbid (HCC)    Follicular lymphoma grade I of lymph nodes of multiple sites (HCC)    Vitamin D deficiency    Stage 3a chronic kidney disease (HCC)    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 (HCC)    Ectatic thoracic aorta (HCC)    Aortic stenosis with trileaflet valve    Sepsis without acute organ dysfunction (HCC)    Acute hypoxic respiratory failure (HCC)    Acute hyponatremia    Chronic diastolic CHF (congestive heart failure) (HCC)    Encounter for deep vein thrombosis (DVT) prophylaxis    Hyponatremia    Neck pain     Past Medical History:   Diagnosis Date    Arthritis 2010's    Occasionally flares up    Cancer (HCC) May 2021    Still investigating    Chronic kidney disease     Diabetes mellitus (HCC)     Follicular lymphoma (HCC)     GERD (gastroesophageal reflux disease) June 2021    Noticed in an Endoscopy    Heart murmur May 2020    High cholesterol     Hypertension     Kidney stone 1980's    Have had since 1980's    Obesity Since Childhood     Past Surgical History:   Procedure Laterality Date    BUNIONECTOMY Right 11/24/2020    Procedure: RIGHT HAV CORRECTION,;  Surgeon: Niranjan Child DPM;  [facility];  Service: Podiatry    COLONOSCOPY      INCISION AND DRAINAGE OF WOUND Right 10/05/2016    Procedure: INCISION AND DRAINAGE (I&D) EXTREMITY;  Surgeon: Niranjan Child DPM;  [facility];  Service:     IR  BIOPSY LYMPH NODE  07/08/2021    IR EMBOLIZATION (SPECIFY VESSEL OR SITE)  07/08/2021    IR PORT PLACEMENT  9/1/2022    PILONIDAL CYST EXCISION      IA CORRECTION HAMMERTOE Right 02/19/2019    Procedure: THIRD HAMMER TOE CORRECTION;  Surgeon: Niranjan Child DPM;  [facility];  Service: Podiatry    IA RMVL MATEO CTR VAD W/SUBQ PORT/ CTR/PRPH INSJ N/A 3/14/2023    Procedure: REMOVAL VENOUS PORT (PORT-A-CATH)IR;  Surgeon: Avelino Vázquez DO;  [facility];  Service: Interventional Radiology    TOE OSTEOTOMY Right 03/14/2017    Procedure: HAMMERTOE CORRECTION R 2 ;  Surgeon: Niranjan Child DPM;  [facility];  Service:     TOE OSTEOTOMY Left 11/24/2020    Procedure: LEFT HT CORRECTION TOE;  Surgeon: Niranjan Child DPM;  [facility];  Service: Podiatry    TONSILLECTOMY  1963     Social History     Socioeconomic History    Marital status: /Civil Union     Spouse name: None    Number of children: None    Years of education: None    Highest education level: None   Occupational History    None   Tobacco Use    Smoking status: Never    Smokeless tobacco: Never    Tobacco comments:     Never smoked but exposed to second hand smoke from birth until 1980's   Vaping Use    Vaping status: Never Used   Substance and Sexual Activity    Alcohol use: Never    Drug use: Never    Sexual activity: Not Currently     Partners: Female     Birth control/protection: Abstinence   Other Topics Concern    None   Social History Narrative    None     Social Determinants of Health     Financial Resource Strain: Not on file   Food Insecurity: No Food Insecurity (7/29/2024)    Hunger Vital Sign     Worried About Running Out of Food in the Last Year: Never true     Ran Out of Food in the Last Year: Never true   Transportation Needs: No Transportation Needs (7/29/2024)    PRAPARE - Transportation     Lack of Transportation (Medical): No     Lack of Transportation (Non-Medical): No   Physical Activity: Not on  file   Stress: Not on file   Social Connections: Not on file   Intimate Partner Violence: Not on file   Housing Stability: Low Risk  (7/29/2024)    Housing Stability Vital Sign     Unable to Pay for Housing in the Last Year: No     Number of Times Moved in the Last Year: 0     Homeless in the Last Year: No        Current Outpatient Medications:     allopurinol (ZYLOPRIM) 300 mg tablet, Take 1 tablet (300 mg total) by mouth daily, Disp: 90 tablet, Rfl: 1    amLODIPine (NORVASC) 10 mg tablet, Take 1 tablet (10 mg total) by mouth daily, Disp: 90 tablet, Rfl: 1    aspirin 81 mg chewable tablet, Chew 81 mg daily, Disp: , Rfl:     Cholecalciferol 100 MCG (4000 UT) CAPS, Take 1 capsule (4,000 Units total) by mouth in the morning, Disp: , Rfl:     cyclobenzaprine (FLEXERIL) 10 mg tablet, Take 1 tablet (10 mg total) by mouth 3 (three) times a day as needed for muscle spasms, Disp: 30 tablet, Rfl: 0    Diclofenac Sodium (VOLTAREN) 1 %, Apply 2 g topically 4 (four) times a day, Disp: 100 g, Rfl: 1    Diclofenac Sodium (VOLTAREN) 1 %, Apply 2 g topically 4 (four) times a day For pain to affected area, Disp: 100 g, Rfl: 0    Diclofenac Sodium (VOLTAREN) 1 %, Apply 2 g topically 4 (four) times a day, Disp: 100 g, Rfl: 0    Empagliflozin (Jardiance) 25 MG TABS, TAKE 1 TABLET BY MOUTH EVERY DAY IN THE MORNING, Disp: 90 tablet, Rfl: 1    hydroCHLOROthiazide 25 mg tablet, Take 1 tablet (25 mg total) by mouth daily, Disp: 90 tablet, Rfl: 1    ketorolac (TORADOL) 10 mg tablet, Take 0.5 tablets (5 mg total) by mouth every 6 (six) hours as needed for moderate pain for up to 5 days, Disp: 10 tablet, Rfl: 0    lidocaine (Lidoderm) 5 %, Apply 1 patch topically over 12 hours daily for 14 days Remove & Discard patch within 12 hours or as directed by MD, Disp: 14 patch, Rfl: 0    lidocaine (LMX) 4 % cream, Apply topically as needed for mild pain, Disp: 120 g, Rfl: 0    losartan (COZAAR) 100 MG tablet, Take 1 tablet (100 mg total) by mouth  daily, Disp: 90 tablet, Rfl: 1    metFORMIN (GLUCOPHAGE) 500 mg tablet, TAKE 2 TABLETS BY MOUTH TWICE A DAY WITH FOOD, Disp: 360 tablet, Rfl: 1    metoprolol succinate (TOPROL-XL) 100 mg 24 hr tablet, Take 1 tablet (100 mg total) by mouth every evening, Disp: 90 tablet, Rfl: 1    Multiple Vitamins-Minerals (Centrum Silver 50+Men) TABS, , Disp: , Rfl:     oxyCODONE (Roxicodone) 5 immediate release tablet, Take 1 tablet (5 mg total) by mouth every 6 (six) hours as needed for moderate pain for up to 7 doses Max Daily Amount: 20 mg, Disp: 7 tablet, Rfl: 0    rosuvastatin (CRESTOR) 40 MG tablet, Take 1 tablet (40 mg total) by mouth every evening, Disp: 90 tablet, Rfl: 1    tiZANidine (ZANAFLEX) 4 mg tablet, Take 1 tablet (4 mg total) by mouth 3 (three) times a day for 14 days, Disp: 42 tablet, Rfl: 0  No current facility-administered medications for this visit.  Family History   Problem Relation Age of Onset    Cancer Father         Leukemia      Review of Systems   Constitutional:  Negative for chills and fever.         Objective:  /72   Pulse 67   Temp (!) 96.8 °F (36 °C)   Resp 18     Physical Exam  Neurological:      Mental Status: He is alert.             Wound 09/07/23 Diabetic Ulcer Foot Right;Plantar;Posterior (Active)   Enter Singh score: Singh Grade 1: Partial or full-thickness ulcer (superficial) 07/25/24 1345   Wound Image   08/08/24 1409   Wound Description Pink;Yellow;Slough 07/25/24 1345   Delisa-wound Assessment Fragile;Maceration;Scar Tissue 07/25/24 1345   Wound Length (cm) 1 cm 07/25/24 1345   Wound Width (cm) 3.2 cm 07/25/24 1345   Wound Depth (cm) 0.1 cm 07/25/24 1345   Wound Surface Area (cm^2) 3.2 cm^2 07/25/24 1345   Wound Volume (cm^3) 0.32 cm^3 07/25/24 1345   Calculated Wound Volume (cm^3) 0.32 cm^3 07/25/24 1345   Change in Wound Size % 68 07/25/24 1345   Tunneling 1 in depth located at 1-12 o'clock 10/05/23 1348   Number of underminings 1 07/11/24 1346   Undermining 1 0.4 07/11/24  1346   Undermining 1 is depth extending from 7-11 07/11/24 1346   Drainage Amount Moderate 07/25/24 1345   Drainage Description Serosanguineous 07/25/24 1345   Non-staged Wound Description Full thickness 07/25/24 1345   Dressing Status Intact 07/25/24 1345                         Debridement    Universal Protocol:  Consent: Verbal consent obtained.  Consent given by: patient    Debridement Details  Debridement type: selective      Post-debridement measurements  Length (cm): 1  Width (cm): 3.2  Depth (cm): 0.1  Percent debrided: 100%  Surface Area (cm^2): 3.2  Area Debrided (cm^2): 3.2  Volume (cm^3): 0.32    Devitalized tissue debrided: slough  Instrument(s) utilized: curette  Technique utilized: nonexcisionalBleeding: small  Hemostasis obtained with: pressure  Procedural pain (0-10): insensate  Post-procedural pain: insensate   Response to treatment: procedure was tolerated well         Results from last 6 Months   Lab Units 07/29/24  0846   WOUND CULTURE  3+ Growth of Staphylococcus aureus*         Wound Instructions:  Orders Placed This Encounter   Procedures    Wound cleansing and dressings Diabetic Ulcer Right;Plantar;Posterior Foot     RIGHT FOOT WOUND     Wash your hands with soap and water. Remove old dressing, discard into plastic bag and place in trash. Cleanse the wound with Dakins Solution prior to applying a clean dressing. Do not use tissue or cotton balls. Do not scrub the wound. Pat dry using gauze.     Shower: No     Apply Mepilex Up to the wound.   Cover with Gauze and ABD.   Secure with Julisa and Tape.     Change dressing every day.     Keep at weight off of right foot at all times.     Standing Status:   Future     Standing Expiration Date:   8/15/2024    Wound Procedure Treatment Diabetic Ulcer Right;Plantar;Posterior Foot     This order was created via procedure documentation    Debridement     This order was created via procedure documentation         Susie Luu  "DPM      Portions of the record may have been created with voice recognition software. Occasional wrong word or \"sound a like\" substitutions may have occurred due to the inherent limitations of voice recognition software. Read the chart carefully and recognize, using context, where substitutions have occurred.     "

## 2024-08-09 DIAGNOSIS — M54.2 NECK PAIN: ICD-10-CM

## 2024-08-12 ENCOUNTER — OFFICE VISIT (OUTPATIENT)
Dept: FAMILY MEDICINE CLINIC | Facility: CLINIC | Age: 69
End: 2024-08-12
Payer: MEDICARE

## 2024-08-12 VITALS
DIASTOLIC BLOOD PRESSURE: 80 MMHG | TEMPERATURE: 98.2 F | HEIGHT: 76 IN | HEART RATE: 57 BPM | SYSTOLIC BLOOD PRESSURE: 142 MMHG | OXYGEN SATURATION: 98 % | WEIGHT: 262 LBS | BODY MASS INDEX: 31.9 KG/M2

## 2024-08-12 DIAGNOSIS — E11.22 TYPE 2 DIABETES MELLITUS WITH STAGE 3 CHRONIC KIDNEY DISEASE, WITHOUT LONG-TERM CURRENT USE OF INSULIN, UNSPECIFIED WHETHER STAGE 3A OR 3B CKD (HCC): ICD-10-CM

## 2024-08-12 DIAGNOSIS — A41.9 SEPSIS WITHOUT ACUTE ORGAN DYSFUNCTION, DUE TO UNSPECIFIED ORGANISM (HCC): Primary | ICD-10-CM

## 2024-08-12 DIAGNOSIS — M54.2 NECK PAIN: ICD-10-CM

## 2024-08-12 DIAGNOSIS — N18.30 TYPE 2 DIABETES MELLITUS WITH STAGE 3 CHRONIC KIDNEY DISEASE, WITHOUT LONG-TERM CURRENT USE OF INSULIN, UNSPECIFIED WHETHER STAGE 3A OR 3B CKD (HCC): ICD-10-CM

## 2024-08-12 DIAGNOSIS — E66.9 OBESITY WITH BODY MASS INDEX 30 OR GREATER: ICD-10-CM

## 2024-08-12 PROCEDURE — 99215 OFFICE O/P EST HI 40 MIN: CPT | Performed by: FAMILY MEDICINE

## 2024-08-12 RX ORDER — KETOROLAC TROMETHAMINE 10 MG/1
5 TABLET, FILM COATED ORAL EVERY 6 HOURS PRN
Qty: 10 TABLET | Refills: 0 | Status: ON HOLD | OUTPATIENT
Start: 2024-08-12 | End: 2024-08-17

## 2024-08-12 NOTE — PROGRESS NOTES
1. Neck pain  MRI cervical spine wo contrast    Ambulatory referral to Spine & Pain Management      2. History of lymphoma  MRI cervical spine wo contrast        Orders Placed This Encounter   Procedures    MRI cervical spine wo contrast    Ambulatory referral to Spine & Pain Management        IMAGING STUDIES: (I personally reviewed images in PACS and report):  CT soft tissue 7/31/24:  The epiglottis appears within normal limits.  History of B-cell lymphoma status post chemotherapy. Small bilateral submandibular, jugular chain and posterior triangle lymph nodes which are below size criteria for pathologic adenopathy.       PAST REPORTS:    7/12/2024 XR Right Shoulder 2+ vw:  - Preserved glenohumeral joint.  - Mild acromioclavicular arthrosis.  - Tiny calcific density adjacent the lateral humeral head, suspicious for early calcific tendinitis      ASSESSMENT/PLAN:  Neck Pain  PMH: sepsis diabetic foot wound 7/20924, diabetes, lymphoma    Repeat X-ray next visit: None    Return for Follow-up with specialist.    Patient instructions below verbally summarized in person during encounter:  Patient Instructions   I recommended mri evaluation of the cervical spine in the setting of history of lymphoma. Patient has history of coils somewhere in the abdomen or kidneys and states has had mri in the past with no issue. I instructed patient to alert the MRI staff and schedulers about the coils to ensure he is a candidate to have mri and avoid any complications. Patient expressed understanding and agreed to plan.       __________________________________________________________________________    HISTORY OF PRESENT ILLNESS:  Patient seen for deck and shoulder pain on 7/12/2024 were diagnosed with trapezius spasm and recommended conservative treatment including Voltaren gel, physical therapy, as well as rest and ice.  At the time with Dr. Jacques's team he did have x-ray revealing mild osteoarthritis of the glenohumeral joint.   Medrol Dosepak was avoided due to history of diabetes.    Today, complains of neck pain radiating to the right trapezius.  Describes constant pain at times can be moderate to severe worse with reaching overhead as well as with neck motion.  Today, his pain is 5 out of 10 improved on recent medications given to him.  Denies any radiating pain down the arm.  Patient has chronic peripheral neuropathy in the upper extremities from diabetes and denies any new onset numbness or tingling in the hands.    Recently unfortunately patient was admitted to the hospital for sepsis thought to be secondary to a diabetic foot wound.  He was recently discharged after antibiotic course.  He states that he did not had any have any improvement of neck pain with hospitalization and antibiotics.    Patient currently taking Zanaflex, Toradol tablets, and Voltaren gel given by his primary care physician     Denies any fevers, denies illness systemic symptoms    Patient has history of non hodgkins lymphoma      Review of Systems      Following history reviewed and update:    Past Medical History:   Diagnosis Date    Arthritis 2010's    Occasionally flares up    Cancer (HCC) May 2021    Still investigating    Chronic kidney disease     Diabetes mellitus (HCC)     Follicular lymphoma (HCC)     GERD (gastroesophageal reflux disease) June 2021    Noticed in an Endoscopy    Heart murmur May 2020    High cholesterol     Hypertension     Kidney stone 1980's    Have had since 1980's    Obesity Since Childhood     Past Surgical History:   Procedure Laterality Date    BUNIONECTOMY Right 11/24/2020    Procedure: RIGHT HAV CORRECTION,;  Surgeon: Niranjan Child DPM;  [facility];  Service: Podiatry    COLONOSCOPY      INCISION AND DRAINAGE OF WOUND Right 10/05/2016    Procedure: INCISION AND DRAINAGE (I&D) EXTREMITY;  Surgeon: Niranjan Child DPM;  [facility];  Service:     IR BIOPSY LYMPH NODE  07/08/2021    IR EMBOLIZATION (SPECIFY VESSEL  "OR SITE)  07/08/2021    IR PORT PLACEMENT  9/1/2022    IR TUNNELED CENTRAL LINE PLACEMENT  8/29/2024    PILONIDAL CYST EXCISION      FL CORRECTION HAMMERTOE Right 02/19/2019    Procedure: THIRD HAMMER TOE CORRECTION;  Surgeon: Niranjan Child DPM;  [facility];  Service: Podiatry    FL RMVL MATEO CTR VAD W/SUBQ PORT/ CTR/PRPH INSJ N/A 3/14/2023    Procedure: REMOVAL VENOUS PORT (PORT-A-CATH)IR;  Surgeon: Avelino Vázquez DO;  [facility];  Service: Interventional Radiology    TOE OSTEOTOMY Right 03/14/2017    Procedure: HAMMERTOE CORRECTION R 2 ;  Surgeon: Niranjan Child DPM;  [facility];  Service:     TOE OSTEOTOMY Left 11/24/2020    Procedure: LEFT HT CORRECTION TOE;  Surgeon: Niranjan Child DPM;  [facility];  Service: Podiatry    TONSILLECTOMY  1963     Social History   Social History     Substance and Sexual Activity   Alcohol Use Never     Social History     Substance and Sexual Activity   Drug Use Never     Social History     Tobacco Use   Smoking Status Never   Smokeless Tobacco Never   Tobacco Comments    Never smoked but exposed to second hand smoke from birth until 1980's     Family History   Problem Relation Age of Onset    Cancer Father         Leukemia     Allergies   Allergen Reactions    Lisinopril Cough          Physical Exam  /80 (BP Location: Left arm, Patient Position: Sitting, Cuff Size: Standard)   Ht 6' 4\" (1.93 m)   Wt 119 kg (262 lb)   BMI 31.89 kg/m²         Ortho Exam  RIGHT SHOULDER:  Erythema: no  Swelling: no  Increased Warmth: no    Tenderness:     ROM  Touchdown sign: intact  External Rotation: Intact  Internal Rotation: Intact    Strength  Abduction: 5/5  ER: 5/5  IR: 5/5    Drop-Arm: negative  Emptycan: negative  Belly Press:   Lift-off Test:    Tobar: negative  Neer: negative  Cross-Arm:   Speeds:     Internal Impingement:  (crank position pain)    Labral Crank Test :  (abducted 90, axial load, guided IR & Er)    Modified Labral " Shift:  (seated, ER, abduction, axial load, guided abd/add)    [place]st's Test:   (FF 90, abd 15, resist thumbs up-, resist thumbs down+)    Apprehension:  Eddie's Relocation Maneuver:    LEFT SHOULDER:  Strength  Abduction: 5/5  ER: 5/5  IR: 5/5    ROM  Touchdown sign: intact    Empty can: negative   __________________________________________________________________________  Procedures

## 2024-08-12 NOTE — PROGRESS NOTES
Transition of Care Visit  Name: Augustus Coffman      : 1955      MRN: [id number]  Encounter Provider: Gwen Lazo DO  Encounter Date: 2024   Encounter department: [facility] [address]    Assessment & Plan   1. Sepsis without acute organ dysfunction, due to unspecified organism (HCC)  2. Neck pain  3. Type 2 diabetes mellitus with stage 3 chronic kidney disease, without long-term current use of insulin, unspecified whether stage 3a or 3b CKD (HCC)  4. Obesity with body mass index 30 or greater  Sepsis felt to be form DM foot wound, culture growing staph aureus. Completed ABX and sepsis resolved. Patient to continue with wound care outpatient.        History of Present Illness     Transitional Care Management Review:   Augustus Coffman is a 69 y.o. male here for TCM follow up.     During the TCM phone call patient stated:  TCM Call       Date and time call was made  2024  2:34 PM    Hospital care reviewed  Records reviewed    Patient was hospitialized at  [facility]    Date of Admission  24    Date of discharge  24    Diagnosis  MSSA bacteremia Principal problem    Disposition  Home    Were the patients medications reviewed and updated  Yes    Current Symptoms  None          TCM Call       Post hospital issues  None    Should patient be enrolled in anticoag monitoring?  No    Scheduled for follow up?  Yes    Did you obtain your prescribed medications  Yes    Do you need help managing your prescriptions or medications  No    Is transportation to your appointment needed  No    I have advised the patient to call PCP with any new or worsening symptoms  Dara Sheets    Living Arrangements  Spouse or Significiant other    Support System  None    The type of support provided  Physical    Do you have social support  Yes, as much as I need    Are you recieving any outpatient services  No    Are you recieving home care services  No    Are  "you using any community resources  No    Current waiver services  No    Have you fallen in the last 12 months  No    Interperter language line needed  No    Counseling  Patient    Counseling topics  Activities of daily living; Importance of RX compliance          HPI    Patient presents to the office for TCM.     Denies fever or chills. Notes that he continues with neck/shoulder pain on the left side, switched from the right side to the left side while in the hospital. Notes that this keeps him up at night. Denies numbness or tingling down the arm. Denies chest pain or SOB.     Blood sugar was great prior to hospitalization, elevated in the hospital.     Has lost about 10 lbs in the last 1 month. Notes no appetite due to pain.     Has been using Tylenol and Tizanidine with some mild improvement. Notes that the oxycodone worked and morphine did not. Gabapentin not helpful.     Has appointment with ortho tomorrow.   Started PT on Friday.     Review of Systems  Objective     /80   Pulse 57   Temp 98.2 °F (36.8 °C)   Ht 6' 4\" (1.93 m)   Wt 119 kg (262 lb)   SpO2 98%   BMI 31.89 kg/m²     Physical Exam  Vitals reviewed.   Constitutional:       General: He is not in acute distress.     Appearance: Normal appearance. He is obese.   HENT:      Head: Normocephalic and atraumatic.      Right Ear: External ear normal.      Left Ear: External ear normal.      Nose: Nose normal.      Mouth/Throat:      Mouth: Mucous membranes are moist.   Eyes:      Extraocular Movements: Extraocular movements intact.      Conjunctiva/sclera: Conjunctivae normal.   Cardiovascular:      Rate and Rhythm: Normal rate and regular rhythm.      Heart sounds: Normal heart sounds.   Pulmonary:      Effort: Pulmonary effort is normal.      Breath sounds: Normal breath sounds. No wheezing, rhonchi or rales.   Abdominal:      General: Bowel sounds are normal. There is no distension.      Palpations: Abdomen is soft.      Tenderness: There is no " abdominal tenderness.   Musculoskeletal:      Right lower leg: No edema.      Left lower leg: No edema.   Skin:     General: Skin is warm.      Capillary Refill: Capillary refill takes less than 2 seconds.      Findings: No rash.   Neurological:      Mental Status: He is alert. Mental status is at baseline.       Medications have been reviewed by provider in current encounter    Administrative Statements       DO Justin Garza [facility]

## 2024-08-13 ENCOUNTER — OFFICE VISIT (OUTPATIENT)
Dept: OBGYN CLINIC | Facility: OTHER | Age: 69
End: 2024-08-13
Payer: MEDICARE

## 2024-08-13 VITALS
DIASTOLIC BLOOD PRESSURE: 80 MMHG | WEIGHT: 262 LBS | BODY MASS INDEX: 31.9 KG/M2 | HEIGHT: 76 IN | SYSTOLIC BLOOD PRESSURE: 130 MMHG

## 2024-08-13 DIAGNOSIS — M54.2 NECK PAIN: Primary | ICD-10-CM

## 2024-08-13 DIAGNOSIS — Z85.72 HISTORY OF LYMPHOMA: ICD-10-CM

## 2024-08-13 PROCEDURE — 99213 OFFICE O/P EST LOW 20 MIN: CPT | Performed by: FAMILY MEDICINE

## 2024-08-13 NOTE — PATIENT INSTRUCTIONS
I recommended mri evaluation of the cervical spine in the setting of history of lymphoma. Patient has history of coils somewhere in the abdomen or kidneys and states has had mri in the past with no issue. I instructed patient to alert the MRI staff and schedulers about the coils to ensure he is a candidate to have mri and avoid any complications. Patient expressed understanding and agreed to plan.

## 2024-08-15 ENCOUNTER — OFFICE VISIT (OUTPATIENT)
Dept: WOUND CARE | Facility: CLINIC | Age: 69
End: 2024-08-15
Payer: MEDICARE

## 2024-08-15 VITALS
RESPIRATION RATE: 18 BRPM | HEART RATE: 65 BPM | TEMPERATURE: 97.6 F | DIASTOLIC BLOOD PRESSURE: 66 MMHG | SYSTOLIC BLOOD PRESSURE: 127 MMHG

## 2024-08-15 DIAGNOSIS — E08.621 DIABETIC ULCER OF OTHER PART OF RIGHT FOOT ASSOCIATED WITH DIABETES MELLITUS DUE TO UNDERLYING CONDITION, WITH FAT LAYER EXPOSED (HCC): Primary | ICD-10-CM

## 2024-08-15 DIAGNOSIS — L97.512 DIABETIC ULCER OF OTHER PART OF RIGHT FOOT ASSOCIATED WITH DIABETES MELLITUS DUE TO UNDERLYING CONDITION, WITH FAT LAYER EXPOSED (HCC): Primary | ICD-10-CM

## 2024-08-15 PROCEDURE — 97597 DBRDMT OPN WND 1ST 20 CM/<: CPT | Performed by: PODIATRIST

## 2024-08-15 RX ORDER — LIDOCAINE 40 MG/G
CREAM TOPICAL ONCE
Status: COMPLETED | OUTPATIENT
Start: 2024-08-15 | End: 2024-08-15

## 2024-08-15 RX ADMIN — LIDOCAINE: 40 CREAM TOPICAL at 13:45

## 2024-08-15 NOTE — PROGRESS NOTES
Patient ID: Augustus Coffman is a 68 y.o. male Date of Birth 1955       Chief Complaint   Patient presents with    Follow Up Wound Care Visit       Allergies:  Lisinopril    Diagnosis:  1. Diabetic ulcer of other part of right foot associated with diabetes mellitus due to underlying condition, with fat layer exposed (HCC)  -     lidocaine (LMX) 4 % cream  -     Wound Procedure Treatment Diabetic Ulcer Right;Plantar;Posterior Foot  -     Wound cleansing and dressings; Future     Diagnosis ICD-10-CM Associated Orders   1. Diabetic ulcer of other part of right foot associated with diabetes mellitus due to underlying condition, with fat layer exposed (HCC)  E08.621 lidocaine (LMX) 4 % cream    L97.512 Wound Procedure Treatment Diabetic Ulcer Right;Plantar;Posterior Foot     Wound cleansing and dressings           Assessment & Plan:  See wound orders.    The patient must continue to stay off the foot.  If he increases activity, he will likely go backwards in healing.  Patient is close to being a candidate for an off-loading orthotic.  Continue mepilex up but just every other day due to lack of drainage.    Subjective:   The patient is still having neck and shoulder pain.  He went to orthopedics and was told it is likely the neck.  He started PT and got some relief.  The plan is for MRI and neck specialist eval.  His BS is all over the place between the pain, recent illness and medications.  His appetite remains low and he continues to lose weight.  He is also staying off the foot.        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HCC)    Hypertension    Hypercholesteremia    Hammer toe of right foot    Stasis dermatitis of both legs    Diabetic peripheral neuropathy (HCC)    Gout    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HCC)    Obesity with body mass index 30 or greater    Type 2 diabetes  mellitus (HCC)    Retroperitoneal hematoma    Mesenteric lymphadenopathy    Acute blood loss anemia    FINA (acute kidney injury) on CKD stage 2(HCC)    Heart murmur    GI bleed    Pleural effusion    Chronic venous hypertension (idiopathic) with ulcer of right lower extremity (HCC)    Rash    Stage 2 chronic kidney disease    Hypertensive kidney disease with stage 2 chronic kidney disease    Other proteinuria    Obesity, morbid (HCC)    Follicular lymphoma grade I of lymph nodes of multiple sites (HCC)    Vitamin D deficiency    Stage 3a chronic kidney disease (HCC)    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 (HCC)    Ectatic thoracic aorta (HCC)    Aortic stenosis with trileaflet valve    Sepsis without acute organ dysfunction (HCC)    Acute hypoxic respiratory failure (HCC)    Acute hyponatremia    Chronic diastolic CHF (congestive heart failure) (HCC)    Encounter for deep vein thrombosis (DVT) prophylaxis    Hyponatremia    Neck pain     Past Medical History:   Diagnosis Date    Arthritis 2010's    Occasionally flares up    Cancer (HCC) May 2021    Still investigating    Chronic kidney disease     Diabetes mellitus (HCC)     Follicular lymphoma (HCC)     GERD (gastroesophageal reflux disease) June 2021    Noticed in an Endoscopy    Heart murmur May 2020    High cholesterol     Hypertension     Kidney stone 1980's    Have had since 1980's    Obesity Since Childhood     Past Surgical History:   Procedure Laterality Date    BUNIONECTOMY Right 11/24/2020    Procedure: RIGHT HAV CORRECTION,;  Surgeon: Niranjan Child DPM;  [facility];  Service: Podiatry    COLONOSCOPY      INCISION AND DRAINAGE OF WOUND Right 10/05/2016    Procedure: INCISION AND DRAINAGE (I&D) EXTREMITY;  Surgeon: Niranjan Child DPM;  [facility];  Service:     IR BIOPSY LYMPH NODE  07/08/2021    IR EMBOLIZATION (SPECIFY VESSEL OR SITE)  07/08/2021    IR PORT PLACEMENT  9/1/2022    PILONIDAL CYST EXCISION      FL CORRECTION HAMMERTOE Right  02/19/2019    Procedure: THIRD HAMMER TOE CORRECTION;  Surgeon: Niranjan Child DPM;  [facility];  Service: Podiatry    VT RMVL MATEO CTR VAD W/SUBQ PORT/ CTR/PRPH INSJ N/A 3/14/2023    Procedure: REMOVAL VENOUS PORT (PORT-A-CATH)IR;  Surgeon: Avelino Vázquez DO;  [facility];  Service: Interventional Radiology    TOE OSTEOTOMY Right 03/14/2017    Procedure: HAMMERTOE CORRECTION R 2 ;  Surgeon: Niranjan Child DPM;  [facility];  Service:     TOE OSTEOTOMY Left 11/24/2020    Procedure: LEFT HT CORRECTION TOE;  Surgeon: Niranjan Child DPM;  [facility];  Service: Podiatry    TONSILLECTOMY  1963     Social History     Socioeconomic History    Marital status: /Civil Union     Spouse name: Not on file    Number of children: Not on file    Years of education: Not on file    Highest education level: Not on file   Occupational History    Not on file   Tobacco Use    Smoking status: Never    Smokeless tobacco: Never    Tobacco comments:     Never smoked but exposed to second hand smoke from birth until 1980's   Vaping Use    Vaping status: Never Used   Substance and Sexual Activity    Alcohol use: Never    Drug use: Never    Sexual activity: Not Currently     Partners: Female     Birth control/protection: Abstinence   Other Topics Concern    Not on file   Social History Narrative    Not on file     Social Determinants of Health     Financial Resource Strain: Not on file   Food Insecurity: No Food Insecurity (7/29/2024)    Hunger Vital Sign     Worried About Running Out of Food in the Last Year: Never true     Ran Out of Food in the Last Year: Never true   Transportation Needs: No Transportation Needs (7/29/2024)    PRAPARE - Transportation     Lack of Transportation (Medical): No     Lack of Transportation (Non-Medical): No   Physical Activity: Not on file   Stress: Not on file   Social Connections: Not on file   Intimate Partner Violence: Not on file   Housing Stability: Low Risk   (7/29/2024)    Housing Stability Vital Sign     Unable to Pay for Housing in the Last Year: No     Number of Times Moved in the Last Year: 0     Homeless in the Last Year: No        Current Outpatient Medications:     allopurinol (ZYLOPRIM) 300 mg tablet, Take 1 tablet (300 mg total) by mouth daily, Disp: 90 tablet, Rfl: 1    amLODIPine (NORVASC) 10 mg tablet, Take 1 tablet (10 mg total) by mouth daily, Disp: 90 tablet, Rfl: 1    aspirin 81 mg chewable tablet, Chew 81 mg daily, Disp: , Rfl:     Cholecalciferol 100 MCG (4000 UT) CAPS, Take 1 capsule (4,000 Units total) by mouth in the morning, Disp: , Rfl:     cyclobenzaprine (FLEXERIL) 10 mg tablet, Take 1 tablet (10 mg total) by mouth 3 (three) times a day as needed for muscle spasms, Disp: 30 tablet, Rfl: 0    Diclofenac Sodium (VOLTAREN) 1 %, Apply 2 g topically 4 (four) times a day (Patient not taking: Reported on 8/12/2024), Disp: 100 g, Rfl: 1    Diclofenac Sodium (VOLTAREN) 1 %, Apply 2 g topically 4 (four) times a day For pain to affected area (Patient not taking: Reported on 8/12/2024), Disp: 100 g, Rfl: 0    Diclofenac Sodium (VOLTAREN) 1 %, Apply 2 g topically 4 (four) times a day, Disp: 100 g, Rfl: 0    Empagliflozin (Jardiance) 25 MG TABS, TAKE 1 TABLET BY MOUTH EVERY DAY IN THE MORNING, Disp: 90 tablet, Rfl: 1    hydroCHLOROthiazide 25 mg tablet, Take 1 tablet (25 mg total) by mouth daily, Disp: 90 tablet, Rfl: 1    ketorolac (TORADOL) 10 mg tablet, Take 0.5 tablets (5 mg total) by mouth every 6 (six) hours as needed for moderate pain for up to 5 days (Patient not taking: Reported on 8/12/2024), Disp: 10 tablet, Rfl: 0    lidocaine (Lidoderm) 5 %, Apply 1 patch topically over 12 hours daily for 14 days Remove & Discard patch within 12 hours or as directed by MD, Disp: 14 patch, Rfl: 0    lidocaine (LMX) 4 % cream, Apply topically as needed for mild pain, Disp: 120 g, Rfl: 0    losartan (COZAAR) 100 MG tablet, Take 1 tablet (100 mg total) by mouth  daily, Disp: 90 tablet, Rfl: 1    metFORMIN (GLUCOPHAGE) 500 mg tablet, TAKE 2 TABLETS BY MOUTH TWICE A DAY WITH FOOD, Disp: 360 tablet, Rfl: 1    metoprolol succinate (TOPROL-XL) 100 mg 24 hr tablet, Take 1 tablet (100 mg total) by mouth every evening, Disp: 90 tablet, Rfl: 1    Multiple Vitamins-Minerals (Centrum Silver 50+Men) TABS, , Disp: , Rfl:     oxyCODONE (Roxicodone) 5 immediate release tablet, Take 1 tablet (5 mg total) by mouth every 6 (six) hours as needed for moderate pain for up to 7 doses Max Daily Amount: 20 mg (Patient not taking: Reported on 8/12/2024), Disp: 7 tablet, Rfl: 0    rosuvastatin (CRESTOR) 40 MG tablet, Take 1 tablet (40 mg total) by mouth every evening, Disp: 90 tablet, Rfl: 1    tiZANidine (ZANAFLEX) 4 mg tablet, Take 1 tablet (4 mg total) by mouth 3 (three) times a day for 14 days, Disp: 42 tablet, Rfl: 0  No current facility-administered medications for this visit.  Family History   Problem Relation Age of Onset    Cancer Father         Leukemia      Review of Systems   Constitutional:  Negative for chills and fever.         Objective:  /66   Pulse 65   Temp 97.6 °F (36.4 °C)   Resp 18     Physical Exam  Neurological:      Mental Status: He is alert.             Wound 09/07/23 Diabetic Ulcer Foot Right;Plantar;Posterior (Active)   Enter Singh score: Singh Grade 1: Partial or full-thickness ulcer (superficial) 08/08/24 1409   Wound Image   08/15/24 1339   Wound Description [place];Pale 08/15/24 1339   Delisa-wound Assessment Callus;Fragile;Scar Tissue 08/15/24 1339   Wound Length (cm) 0.4 cm 08/15/24 1339   Wound Width (cm) 0.5 cm 08/15/24 1339   Wound Depth (cm) 0.1 cm 08/15/24 1339   Wound Surface Area (cm^2) 0.2 cm^2 08/15/24 1339   Wound Volume (cm^3) 0.02 cm^3 08/15/24 1339   Calculated Wound Volume (cm^3) 0.02 cm^3 08/15/24 1339   Change in Wound Size % 98 08/15/24 1339   Tunneling 1 in depth located at 1-12 o'clock 10/05/23 1348   Number of underminings 1 07/11/24 1349    Undermining 1 0.4 07/11/24 1346   Undermining 1 is depth extending from 7-11 07/11/24 1346   Drainage Amount Small 08/15/24 1339   Drainage Description Serosanguineous 08/15/24 1339   Non-staged Wound Description Full thickness 08/15/24 1339   Dressing Status Intact 08/08/24 1409                         Debridement   Wound 09/07/23 Diabetic Ulcer Foot Right;Plantar;Posterior    Universal Protocol:  Consent: Verbal consent obtained.  Consent given by: patient  Patient understanding: patient states understanding of the procedure being performed  Patient identity confirmed: verbally with patient    Debridement Details  Performed by: physician  Debridement type: selective  Pain control: lidocaine 1%      Post-debridement measurements  Length (cm): 0.4  Width (cm): 0.5  Depth (cm): 0.1  Percent debrided: 100%  Surface Area (cm^2): 0.2  Area Debrided (cm^2): 0.2  Volume (cm^3): 0.02    Devitalized tissue debrided: necrotic debris and slough  Instrument(s) utilized: curette  Technique utilized: nonexcisionalBleeding: small  Hemostasis obtained with: silver nitrate  Procedural pain (0-10): insensate  Post-procedural pain: insensate   Response to treatment: procedure was tolerated well         Results from last 6 Months   Lab Units 07/29/24  0846   WOUND CULTURE  3+ Growth of Staphylococcus aureus*         Wound Instructions:  Orders Placed This Encounter   Procedures    Wound Procedure Treatment Diabetic Ulcer Right;Plantar;Posterior Foot     This order was created via procedure documentation    Wound cleansing and dressings     RIGHT FOOT WOUND      Wash your hands with soap and water. Remove old dressing, discard into plastic bag and place in trash. Cleanse the wound with Dakins Solution prior to applying a clean dressing. Do not use tissue or cotton balls. Do not scrub the wound. Pat dry using gauze.      Shower: No      Apply Mepilex Up to the wound.   Cover with Gauze and ABD.   Secure with Julisa and Tape.       "Change dressing every OTHER day.      Keep at weight off of right foot at all times.     Standing Status:   Future     Standing Expiration Date:   8/22/2024    Debridement     This order was created via procedure documentation         Susie Luu DPM      Portions of the record may have been created with voice recognition software. Occasional wrong word or \"sound a like\" substitutions may have occurred due to the inherent limitations of voice recognition software. Read the chart carefully and recognize, using context, where substitutions have occurred.     "

## 2024-08-15 NOTE — PATIENT INSTRUCTIONS
Orders Placed This Encounter   Procedures    Wound Procedure Treatment Diabetic Ulcer Right;Plantar;Posterior Foot     This order was created via procedure documentation    Wound cleansing and dressings     RIGHT FOOT WOUND      Wash your hands with soap and water. Remove old dressing, discard into plastic bag and place in trash. Cleanse the wound with Dakins Solution prior to applying a clean dressing. Do not use tissue or cotton balls. Do not scrub the wound. Pat dry using gauze.      Shower: No      Apply Mepilex Up to the wound.   Cover with Gauze and ABD.   Secure with Julisa and Tape.      Change dressing every OTHER day.      Keep at weight off of right foot at all times.     Standing Status:   Future     Standing Expiration Date:   8/22/2024

## 2024-08-15 NOTE — PROGRESS NOTES
Wound Procedure Treatment Diabetic Ulcer Right;Plantar;Posterior Foot    Performed by: Denisha Oliva LPN  Authorized by: Susie Luu DPM    Associated wounds:   Wound 09/07/23 Diabetic Ulcer Foot Right;Plantar;Posterior  Applied primary dressing:  Other  Applied secondary dressing:  ABD  Dressing secured with:  Julisa and Tape  Comments:  MEPILEX UP

## 2024-08-16 DIAGNOSIS — M54.2 NECK PAIN: ICD-10-CM

## 2024-08-20 ENCOUNTER — TELEPHONE (OUTPATIENT)
Dept: OBGYN CLINIC | Facility: OTHER | Age: 69
End: 2024-08-20

## 2024-08-20 ENCOUNTER — APPOINTMENT (EMERGENCY)
Dept: RADIOLOGY | Facility: HOSPITAL | Age: 69
DRG: 551 | End: 2024-08-20
Payer: MEDICARE

## 2024-08-20 ENCOUNTER — HOSPITAL ENCOUNTER (OUTPATIENT)
Dept: MRI IMAGING | Facility: HOSPITAL | Age: 69
Discharge: HOME | DRG: 551 | End: 2024-08-20
Payer: MEDICARE

## 2024-08-20 ENCOUNTER — HOSPITAL ENCOUNTER (INPATIENT)
Facility: HOSPITAL | Age: 69
LOS: 3 days | DRG: 551 | End: 2024-08-23
Attending: EMERGENCY MEDICINE | Admitting: STUDENT IN AN ORGANIZED HEALTH CARE EDUCATION/TRAINING PROGRAM
Payer: MEDICARE

## 2024-08-20 DIAGNOSIS — M46.42 CERVICAL DISCITIS: ICD-10-CM

## 2024-08-20 DIAGNOSIS — I48.91 A-FIB (HCC): ICD-10-CM

## 2024-08-20 DIAGNOSIS — Z85.72 HISTORY OF LYMPHOMA: ICD-10-CM

## 2024-08-20 DIAGNOSIS — E87.5 HYPERKALEMIA: ICD-10-CM

## 2024-08-20 DIAGNOSIS — M54.2 NECK PAIN: ICD-10-CM

## 2024-08-20 DIAGNOSIS — I31.39 PERICARDIAL EFFUSION: ICD-10-CM

## 2024-08-20 DIAGNOSIS — I48.91 NEW ONSET A-FIB (HCC): Primary | ICD-10-CM

## 2024-08-20 DIAGNOSIS — N17.9 AKI (ACUTE KIDNEY INJURY) (HCC): ICD-10-CM

## 2024-08-20 PROBLEM — R74.01 TRANSAMINITIS: Status: ACTIVE | Noted: 2024-08-20

## 2024-08-20 LAB
ALBUMIN SERPL BCG-MCNC: 3.8 G/DL (ref 3.5–5)
ALP SERPL-CCNC: 116 U/L (ref 34–104)
ALT SERPL W P-5'-P-CCNC: 118 U/L (ref 7–52)
ANION GAP SERPL CALCULATED.3IONS-SCNC: 10 MMOL/L (ref 4–13)
AST SERPL W P-5'-P-CCNC: 107 U/L (ref 13–39)
BASOPHILS # BLD AUTO: 0.06 THOUSANDS/ÂΜL (ref 0–0.1)
BASOPHILS NFR BLD AUTO: 1 % (ref 0–1)
BILIRUB SERPL-MCNC: 0.3 MG/DL (ref 0.2–1)
BUN SERPL-MCNC: 39 MG/DL (ref 5–25)
CALCIUM SERPL-MCNC: 10.3 MG/DL (ref 8.4–10.2)
CHLORIDE SERPL-SCNC: 97 MMOL/L (ref 96–108)
CO2 SERPL-SCNC: 30 MMOL/L (ref 21–32)
CREAT SERPL-MCNC: 1.36 MG/DL (ref 0.6–1.3)
CRP SERPL QL: 221 MG/L
EOSINOPHIL # BLD AUTO: 0.25 THOUSAND/ÂΜL (ref 0–0.61)
EOSINOPHIL NFR BLD AUTO: 3 % (ref 0–6)
ERYTHROCYTE [DISTWIDTH] IN BLOOD BY AUTOMATED COUNT: 16.4 % (ref 11.6–15.1)
ERYTHROCYTE [SEDIMENTATION RATE] IN BLOOD: 75 MM/HOUR (ref 0–19)
FLUAV RNA RESP QL NAA+PROBE: NEGATIVE
FLUBV RNA RESP QL NAA+PROBE: NEGATIVE
GFR SERPL CREATININE-BSD FRML MDRD: 53 ML/MIN/1.73SQ M
GLUCOSE SERPL-MCNC: 137 MG/DL (ref 65–140)
GLUCOSE SERPL-MCNC: 163 MG/DL (ref 65–140)
HCT VFR BLD AUTO: 33.8 % (ref 36.5–49.3)
HGB BLD-MCNC: 10.5 G/DL (ref 12–17)
IMM GRANULOCYTES # BLD AUTO: 0.05 THOUSAND/UL (ref 0–0.2)
IMM GRANULOCYTES NFR BLD AUTO: 1 % (ref 0–2)
LACTATE SERPL-SCNC: 1.7 MMOL/L (ref 0.5–2)
LYMPHOCYTES # BLD AUTO: 0.61 THOUSANDS/ÂΜL (ref 0.6–4.47)
LYMPHOCYTES NFR BLD AUTO: 6 % (ref 14–44)
MAGNESIUM SERPL-MCNC: 2.2 MG/DL (ref 1.9–2.7)
MCH RBC QN AUTO: 25.5 PG (ref 26.8–34.3)
MCHC RBC AUTO-ENTMCNC: 31.1 G/DL (ref 31.4–37.4)
MCV RBC AUTO: 82 FL (ref 82–98)
MONOCYTES # BLD AUTO: 0.76 THOUSAND/ÂΜL (ref 0.17–1.22)
MONOCYTES NFR BLD AUTO: 8 % (ref 4–12)
NEUTROPHILS # BLD AUTO: 7.88 THOUSANDS/ÂΜL (ref 1.85–7.62)
NEUTS SEG NFR BLD AUTO: 81 % (ref 43–75)
NRBC BLD AUTO-RTO: 0 /100 WBCS
PLATELET # BLD AUTO: 245 THOUSANDS/UL (ref 149–390)
PMV BLD AUTO: 9.8 FL (ref 8.9–12.7)
POTASSIUM SERPL-SCNC: 5 MMOL/L (ref 3.5–5.3)
PROT SERPL-MCNC: 7.7 G/DL (ref 6.4–8.4)
RBC # BLD AUTO: 4.12 MILLION/UL (ref 3.88–5.62)
RSV RNA RESP QL NAA+PROBE: NEGATIVE
SARS-COV-2 RNA RESP QL NAA+PROBE: NEGATIVE
SODIUM SERPL-SCNC: 137 MMOL/L (ref 135–147)
WBC # BLD AUTO: 9.61 THOUSAND/UL (ref 4.31–10.16)

## 2024-08-20 PROCEDURE — 83605 ASSAY OF LACTIC ACID: CPT | Performed by: EMERGENCY MEDICINE

## 2024-08-20 PROCEDURE — NC001 PR NO CHARGE: Performed by: NEUROLOGICAL SURGERY

## 2024-08-20 PROCEDURE — 87147 CULTURE TYPE IMMUNOLOGIC: CPT | Performed by: EMERGENCY MEDICINE

## 2024-08-20 PROCEDURE — 72141 MRI NECK SPINE W/O DYE: CPT

## 2024-08-20 PROCEDURE — 87154 CUL TYP ID BLD PTHGN 6+ TRGT: CPT | Performed by: EMERGENCY MEDICINE

## 2024-08-20 PROCEDURE — 82948 REAGENT STRIP/BLOOD GLUCOSE: CPT

## 2024-08-20 PROCEDURE — 86140 C-REACTIVE PROTEIN: CPT | Performed by: EMERGENCY MEDICINE

## 2024-08-20 PROCEDURE — 83735 ASSAY OF MAGNESIUM: CPT | Performed by: EMERGENCY MEDICINE

## 2024-08-20 PROCEDURE — 0241U HB NFCT DS VIR RESP RNA 4 TRGT: CPT | Performed by: EMERGENCY MEDICINE

## 2024-08-20 PROCEDURE — 99223 1ST HOSP IP/OBS HIGH 75: CPT | Performed by: STUDENT IN AN ORGANIZED HEALTH CARE EDUCATION/TRAINING PROGRAM

## 2024-08-20 PROCEDURE — 36415 COLL VENOUS BLD VENIPUNCTURE: CPT | Performed by: EMERGENCY MEDICINE

## 2024-08-20 PROCEDURE — 85025 COMPLETE CBC W/AUTO DIFF WBC: CPT | Performed by: EMERGENCY MEDICINE

## 2024-08-20 PROCEDURE — 99285 EMERGENCY DEPT VISIT HI MDM: CPT

## 2024-08-20 PROCEDURE — 85652 RBC SED RATE AUTOMATED: CPT | Performed by: EMERGENCY MEDICINE

## 2024-08-20 PROCEDURE — 87040 BLOOD CULTURE FOR BACTERIA: CPT | Performed by: EMERGENCY MEDICINE

## 2024-08-20 PROCEDURE — 80053 COMPREHEN METABOLIC PANEL: CPT | Performed by: EMERGENCY MEDICINE

## 2024-08-20 PROCEDURE — 99291 CRITICAL CARE FIRST HOUR: CPT | Performed by: EMERGENCY MEDICINE

## 2024-08-20 PROCEDURE — 87186 SC STD MICRODIL/AGAR DIL: CPT | Performed by: EMERGENCY MEDICINE

## 2024-08-20 PROCEDURE — 73630 X-RAY EXAM OF FOOT: CPT

## 2024-08-20 PROCEDURE — 71046 X-RAY EXAM CHEST 2 VIEWS: CPT

## 2024-08-20 RX ORDER — ACETAMINOPHEN 325 MG/1
650 TABLET ORAL EVERY 6 HOURS PRN
Status: DISCONTINUED | OUTPATIENT
Start: 2024-08-20 | End: 2024-08-23 | Stop reason: HOSPADM

## 2024-08-20 RX ORDER — ATORVASTATIN CALCIUM 40 MG/1
80 TABLET, FILM COATED ORAL
Status: DISCONTINUED | OUTPATIENT
Start: 2024-08-20 | End: 2024-08-23 | Stop reason: HOSPADM

## 2024-08-20 RX ORDER — DOCUSATE SODIUM 100 MG/1
100 CAPSULE, LIQUID FILLED ORAL 2 TIMES DAILY
Status: DISCONTINUED | OUTPATIENT
Start: 2024-08-20 | End: 2024-08-23 | Stop reason: HOSPADM

## 2024-08-20 RX ORDER — HYDROMORPHONE HCL/PF 1 MG/ML
0.5 SYRINGE (ML) INJECTION
Status: DISCONTINUED | OUTPATIENT
Start: 2024-08-20 | End: 2024-08-20

## 2024-08-20 RX ORDER — HYDROMORPHONE HCL/PF 1 MG/ML
0.5 SYRINGE (ML) INJECTION EVERY 4 HOURS PRN
Status: DISCONTINUED | OUTPATIENT
Start: 2024-08-20 | End: 2024-08-20

## 2024-08-20 RX ORDER — SENNOSIDES 8.6 MG
1 TABLET ORAL DAILY
Status: DISCONTINUED | OUTPATIENT
Start: 2024-08-21 | End: 2024-08-23 | Stop reason: HOSPADM

## 2024-08-20 RX ORDER — HYDROMORPHONE HCL/PF 1 MG/ML
1 SYRINGE (ML) INJECTION
Status: DISCONTINUED | OUTPATIENT
Start: 2024-08-20 | End: 2024-08-22

## 2024-08-20 RX ORDER — INSULIN LISPRO 100 [IU]/ML
1-6 INJECTION, SOLUTION INTRAVENOUS; SUBCUTANEOUS
Status: DISCONTINUED | OUTPATIENT
Start: 2024-08-20 | End: 2024-08-20

## 2024-08-20 RX ORDER — INSULIN LISPRO 100 [IU]/ML
1-6 INJECTION, SOLUTION INTRAVENOUS; SUBCUTANEOUS
Status: DISCONTINUED | OUTPATIENT
Start: 2024-08-20 | End: 2024-08-23 | Stop reason: HOSPADM

## 2024-08-20 RX ORDER — CEFAZOLIN SODIUM 2 G/50ML
2000 SOLUTION INTRAVENOUS EVERY 8 HOURS
Status: DISCONTINUED | OUTPATIENT
Start: 2024-08-20 | End: 2024-08-23 | Stop reason: HOSPADM

## 2024-08-20 RX ORDER — CALCIUM CARBONATE 500 MG/1
1000 TABLET, CHEWABLE ORAL DAILY PRN
Status: DISCONTINUED | OUTPATIENT
Start: 2024-08-20 | End: 2024-08-23 | Stop reason: HOSPADM

## 2024-08-20 RX ORDER — AMLODIPINE BESYLATE 5 MG/1
5 TABLET ORAL DAILY
Status: DISCONTINUED | OUTPATIENT
Start: 2024-08-21 | End: 2024-08-21

## 2024-08-20 RX ORDER — ACETAMINOPHEN 10 MG/ML
1000 INJECTION, SOLUTION INTRAVENOUS EVERY 6 HOURS SCHEDULED
Status: DISCONTINUED | OUTPATIENT
Start: 2024-08-20 | End: 2024-08-23 | Stop reason: HOSPADM

## 2024-08-20 RX ORDER — HEPARIN SODIUM 5000 [USP'U]/ML
5000 INJECTION, SOLUTION INTRAVENOUS; SUBCUTANEOUS EVERY 8 HOURS SCHEDULED
Status: DISCONTINUED | OUTPATIENT
Start: 2024-08-20 | End: 2024-08-22

## 2024-08-20 RX ORDER — ONDANSETRON 2 MG/ML
4 INJECTION INTRAMUSCULAR; INTRAVENOUS EVERY 6 HOURS PRN
Status: DISCONTINUED | OUTPATIENT
Start: 2024-08-20 | End: 2024-08-23 | Stop reason: HOSPADM

## 2024-08-20 RX ORDER — INSULIN LISPRO 100 [IU]/ML
1-6 INJECTION, SOLUTION INTRAVENOUS; SUBCUTANEOUS
Status: DISCONTINUED | OUTPATIENT
Start: 2024-08-21 | End: 2024-08-20

## 2024-08-20 RX ORDER — INSULIN LISPRO 100 [IU]/ML
1-6 INJECTION, SOLUTION INTRAVENOUS; SUBCUTANEOUS
Status: DISCONTINUED | OUTPATIENT
Start: 2024-08-21 | End: 2024-08-23 | Stop reason: HOSPADM

## 2024-08-20 RX ORDER — METOPROLOL SUCCINATE 50 MG/1
50 TABLET, EXTENDED RELEASE ORAL EVERY EVENING
Status: DISCONTINUED | OUTPATIENT
Start: 2024-08-20 | End: 2024-08-22

## 2024-08-20 RX ADMIN — HYDROMORPHONE HYDROCHLORIDE 0.5 MG: 1 INJECTION, SOLUTION INTRAMUSCULAR; INTRAVENOUS; SUBCUTANEOUS at 20:57

## 2024-08-20 RX ADMIN — HYDROMORPHONE HYDROCHLORIDE 0.5 MG: 1 INJECTION, SOLUTION INTRAMUSCULAR; INTRAVENOUS; SUBCUTANEOUS at 18:08

## 2024-08-20 RX ADMIN — ATORVASTATIN CALCIUM 80 MG: 40 TABLET, FILM COATED ORAL at 18:15

## 2024-08-20 RX ADMIN — ACETAMINOPHEN 1000 MG: 10 INJECTION INTRAVENOUS at 22:04

## 2024-08-20 RX ADMIN — METOPROLOL SUCCINATE 50 MG: 50 TABLET, EXTENDED RELEASE ORAL at 18:15

## 2024-08-20 RX ADMIN — SODIUM CHLORIDE 500 ML: 0.9 INJECTION, SOLUTION INTRAVENOUS at 18:10

## 2024-08-20 RX ADMIN — DOCUSATE SODIUM 100 MG: 100 CAPSULE, LIQUID FILLED ORAL at 18:15

## 2024-08-20 RX ADMIN — HYDROMORPHONE HYDROCHLORIDE 1 MG: 1 INJECTION, SOLUTION INTRAMUSCULAR; INTRAVENOUS; SUBCUTANEOUS at 22:13

## 2024-08-20 RX ADMIN — HEPARIN SODIUM 5000 UNITS: 5000 INJECTION INTRAVENOUS; SUBCUTANEOUS at 18:16

## 2024-08-20 RX ADMIN — CEFAZOLIN SODIUM 2000 MG: 2 SOLUTION INTRAVENOUS at 18:10

## 2024-08-20 RX ADMIN — INSULIN LISPRO 1 UNITS: 100 INJECTION, SOLUTION INTRAVENOUS; SUBCUTANEOUS at 22:04

## 2024-08-20 NOTE — TELEPHONE ENCOUNTER
I was notified by radiologist today that MRI read of the cervical spine revealed findings consistent with infectious discitis.  This is in the setting of recent hospital mission for sepsis in July 2024.  I spoke with patient via telephone today at approximately 12:25 PM on 8/20/2024 and notified of findings.  I instructed patient to go to the emergency room for emergency treatment and consideration of IV antibiotics.  I explained risk of progression to sepsis and severe illness.  Patient expressed understanding and agreed to plan.  He stated he would present to the emergency department within the hour at [facility].

## 2024-08-20 NOTE — ASSESSMENT & PLAN NOTE
"Lab Results   Component Value Date    HGBA1C 6.8 (H) 07/05/2024       No results for input(s): \"POCGLU\" in the last 72 hours.    Blood Sugar Average: Last 72 hrs:  Start sliding scale  Monitor glucose levels     "

## 2024-08-20 NOTE — ASSESSMENT & PLAN NOTE
Admitted to [facility] in July for infected diabetic foot ulcer  Completed course of antibiotics   During that admission patient was reporting neck pain  Suspected to be musculoskeletal, had ortho outpatient follow up pending  Improved with toradol and zanaflex  Followed up with Ortho and MRI cervical spine was ordered  Shows diskitis/myelitis with phlegmonous changes  Initially was accepted for transfer to [facility] today  But no surgical intervention was planned  ID recommended long term antibiotics  So patient admitted to [facility] and [facility] started  Consult ID and follow up infectious work up

## 2024-08-20 NOTE — ASSESSMENT & PLAN NOTE
Likely pre renal in setting of home diuretics  Hold HCTZ and losartan  Give IV fluid bolus  Follow up creatinine in AM

## 2024-08-20 NOTE — EMTALA/ACUTE CARE TRANSFER
[facility] EMERGENCY DEPARTMENT  [address]  [place] [address]  Dept: [phone]      EMTALA TRANSFER CONSENT    NAME Augustus Coffman                                         1955                              MRN [id number]    I have been informed of my rights regarding examination, treatment, and transfer   by Dr. Ioana Garvin MD    Benefits: Specialized equipment and/or services available at the receiving facility (Include comment)________________________    Risks: Potential for delay in receiving treatment      Consent for Transfer:  I acknowledge that my medical condition has been evaluated and explained to me by the emergency department physician or other qualified medical person and/or my attending physician, who has recommended that I be transferred to the service of  Accepting Physician: Cari Sarabia at Accepting Facility Name, City & State : [facility]. The above potential benefits of such transfer, the potential risks associated with such transfer, and the probable risks of not being transferred have been explained to me, and I fully understand them.  The doctor has explained that, in my case, the benefits of transfer outweigh the risks.  I agree to be transferred.    I authorize the performance of emergency medical procedures and treatments upon me in both transit and upon arrival at the receiving facility.  Additionally, I authorize the release of any and all medical records to the receiving facility and request they be transported with me, if possible.  I understand that the safest mode of transportation during a medical emergency is an ambulance and that the Hospital advocates the use of this mode of transport. Risks of traveling to the receiving facility by car, including absence of medical control, life sustaining equipment, such as oxygen, and medical personnel has been explained to me and I fully understand them.    (TRAVIS CORRECT BOX BELOW)  [  ]  I  consent to the stated transfer and to be transported by ambulance/helicopter.  [  ]  I consent to the stated transfer, but refuse transportation by ambulance and accept full responsibility for my transportation by car.  I understand the risks of non-ambulance transfers and I exonerate the Hospital and its staff from any deterioration in my condition that results from this refusal.    X___________________________________________    DATE  24  TIME________  Signature of patient or legally responsible individual signing on patient behalf           RELATIONSHIP TO PATIENT_________________________          Provider Certification    NAME Augustus Coffman                                         1955                              MRN [id number]    A medical screening exam was performed on the above named patient.  Based on the examination:    Condition Necessitating Transfer The encounter diagnosis was Cervical discitis.    Patient Condition: The patient has been stabilized such that within reasonable medical probability, no material deterioration of the patient condition or the condition of the unborn child(jane) is likely to result from the transfer    Reason for Transfer: Level of Care needed not available at this facility    Transfer Requirements: Facility [facility]   Space available and qualified personnel available for treatment as acknowledged by PACS  Agreed to accept transfer and to provide appropriate medical treatment as acknowledged by       Cari Sarabia  Appropriate medical records of the examination and treatment of the patient are provided at the time of transfer   STAFF INITIAL WHEN COMPLETED _______  Transfer will be performed by qualified personnel from    and appropriate transfer equipment as required, including the use of necessary and appropriate life support measures.    Provider Certification: I have examined the patient and explained the following risks and benefits of being transferred/refusing  transfer to the patient/family:         Based on these reasonable risks and benefits to the patient and/or the unborn child(jane), and based upon the information available at the time of the patient’s examination, I certify that the medical benefits reasonably to be expected from the provision of appropriate medical treatments at another medical facility outweigh the increasing risks, if any, to the individual’s medical condition, and in the case of labor to the unborn child, from effecting the transfer.    X____________________________________________ DATE 08/20/24        TIME_______      ORIGINAL - SEND TO MEDICAL RECORDS   COPY - SEND WITH PATIENT DURING TRANSFER

## 2024-08-20 NOTE — ED NOTES
Unsuccessful IV attempt x2, Doris RN at bedside to attempt     Abdelrahman Lopez RN  08/20/24 3468

## 2024-08-20 NOTE — ED PROVIDER NOTES
"History  Chief Complaint   Patient presents with    Evaluation of Abnormal Diagnostic Test     Pt reports he had an MRI done this morning and was told he needed to come in for IV antibiotics. \"They found infection in my neck\".        History provided by:  Patient  Evaluation of Abnormal Diagnostic Test  Time since result:  Today  Patient referred by:  Specialist  Resulting agency:  [facility]  Result type: radiology    Radiology:     Other abnormal imaging result:  MRI of cervical spine  Medical Problem  Location:  Neck  Quality:  Pain and stiffness  Severity:  Moderate  Onset quality:  Gradual  Duration:  5 weeks  Timing:  Constant  Progression:  Unchanged  Chronicity:  New  Context:  Pt is coming after he was sent by radiology for abnormal MRI of his cervical spine. Pt was recently admitted for sepsis secondary to his diabetic foot and was on abx. He has had neck pain for longer though, for about 5-6 weeks. When admitted got abx but neck pain continued, so as outpt got imaging and positive for cervical diskitis  Relieved by:  Nothing  Worsened by:  Movement  Ineffective treatments:  None. Pt has no neuro deficits, Pain just radiates into his shoulder and diffusely in his neck. Has tingling in his extremities at baseline secondary to DM and neuropahty but that is at his baseline.  Associated symptoms: no abdominal pain, no chest pain, no congestion, no fatigue, no fever, no headaches, no nausea, no shortness of breath, no sore throat, no vomiting and no wheezing        Prior to Admission Medications   Prescriptions Last Dose Informant Patient Reported? Taking?   Cholecalciferol 100 MCG (4000 UT) CAPS  Self No No   Sig: Take 1 capsule (4,000 Units total) by mouth in the morning   Diclofenac Sodium (VOLTAREN) 1 %   No No   Sig: Apply 2 g topically 4 (four) times a day   Patient not taking: Reported on 8/12/2024   Diclofenac Sodium (VOLTAREN) 1 %   No No   Sig: Apply 2 g topically 4 (four) times a day For pain to " affected area   Patient not taking: Reported on 8/12/2024   Diclofenac Sodium (VOLTAREN) 1 %   No No   Sig: Apply 2 g topically 4 (four) times a day   Empagliflozin (Jardiance) 25 MG TABS  Self No No   Sig: TAKE 1 TABLET BY MOUTH EVERY DAY IN THE MORNING   Multiple Vitamins-Minerals (Centrum Silver 50+Men) TABS  Self Yes No   allopurinol (ZYLOPRIM) 300 mg tablet  Self No No   Sig: Take 1 tablet (300 mg total) by mouth daily   amLODIPine (NORVASC) 10 mg tablet  Self No No   Sig: Take 1 tablet (10 mg total) by mouth daily   aspirin 81 mg chewable tablet  Self Yes No   Sig: Chew 81 mg daily   cyclobenzaprine (FLEXERIL) 10 mg tablet   No No   Sig: Take 1 tablet (10 mg total) by mouth 3 (three) times a day as needed for muscle spasms   hydroCHLOROthiazide 25 mg tablet  Self No No   Sig: Take 1 tablet (25 mg total) by mouth daily   ketorolac (TORADOL) 10 mg tablet   No No   Sig: Take 0.5 tablets (5 mg total) by mouth every 6 (six) hours as needed for moderate pain for up to 5 days   Patient not taking: Reported on 8/12/2024   lidocaine (LMX) 4 % cream   No No   Sig: Apply topically as needed for mild pain   lidocaine (Lidoderm) 5 %  Self No No   Sig: Apply 1 patch topically over 12 hours daily for 14 days Remove & Discard patch within 12 hours or as directed by MD   losartan (COZAAR) 100 MG tablet  Self No No   Sig: Take 1 tablet (100 mg total) by mouth daily   metFORMIN (GLUCOPHAGE) 500 mg tablet  Self No No   Sig: TAKE 2 TABLETS BY MOUTH TWICE A DAY WITH FOOD   metoprolol succinate (TOPROL-XL) 100 mg 24 hr tablet  Self No No   Sig: Take 1 tablet (100 mg total) by mouth every evening   oxyCODONE (Roxicodone) 5 immediate release tablet  Self No No   Sig: Take 1 tablet (5 mg total) by mouth every 6 (six) hours as needed for moderate pain for up to 7 doses Max Daily Amount: 20 mg   Patient not taking: Reported on 8/12/2024   rosuvastatin (CRESTOR) 40 MG tablet  Self No No   Sig: Take 1 tablet (40 mg total) by mouth every  evening   tiZANidine (ZANAFLEX) 4 mg tablet   No No   Sig: Take 1 tablet (4 mg total) by mouth 3 (three) times a day for 14 days      Facility-Administered Medications: None       Past Medical History:   Diagnosis Date    Arthritis 2010's    Occasionally flares up    Cancer (HCC) May 2021    Still investigating    Chronic kidney disease     Diabetes mellitus (HCC)     Follicular lymphoma (HCC)     GERD (gastroesophageal reflux disease) June 2021    Noticed in an Endoscopy    Heart murmur May 2020    High cholesterol     Hypertension     Kidney stone 1980's    Have had since 1980's    Obesity Since Childhood       Past Surgical History:   Procedure Laterality Date    BUNIONECTOMY Right 11/24/2020    Procedure: RIGHT HAV CORRECTION,;  Surgeon: Niranjan Child DPM;  [facility];  Service: Podiatry    COLONOSCOPY      INCISION AND DRAINAGE OF WOUND Right 10/05/2016    Procedure: INCISION AND DRAINAGE (I&D) EXTREMITY;  Surgeon: Niranjan Child DPM;  Location[facility];  Service:     IR BIOPSY LYMPH NODE  07/08/2021    IR EMBOLIZATION (SPECIFY VESSEL OR SITE)  07/08/2021    IR PORT PLACEMENT  9/1/2022    PILONIDAL CYST EXCISION      IL CORRECTION HAMMERTOE Right 02/19/2019    Procedure: THIRD HAMMER TOE CORRECTION;  Surgeon: Niranjan Child DPM;  [facility];  Service: Podiatry    IL RMVL MATEO CTR VAD W/SUBQ PORT/ CTR/PRPH INSJ N/A 3/14/2023    Procedure: REMOVAL VENOUS PORT (PORT-A-CATH)IR;  Surgeon: Avelino Vázquez DO;  [facility];  Service: Interventional Radiology    TOE OSTEOTOMY Right 03/14/2017    Procedure: HAMMERTOE CORRECTION R 2 ;  Surgeon: Niranjan Child DPM;  [facility];  Service:     TOE OSTEOTOMY Left 11/24/2020    Procedure: LEFT HT CORRECTION TOE;  Surgeon: Niranjan Child DPM;  [facility];  Service: Podiatry    TONSILLECTOMY  1962       Family History   Problem Relation Age of Onset    Cancer Father         Leukemia     I have reviewed and agree with the  history as documented.    E-Cigarette/Vaping    E-Cigarette Use Never User      E-Cigarette/Vaping Substances    Nicotine No     THC No     CBD No     Flavoring No     Other No     Unknown No      Social History     Tobacco Use    Smoking status: Never    Smokeless tobacco: Never    Tobacco comments:     Never smoked but exposed to second hand smoke from birth until 1980's   Vaping Use    Vaping status: Never Used   Substance Use Topics    Alcohol use: Never    Drug use: Never       Review of Systems   Constitutional:  Negative for fatigue and fever.   HENT:  Negative for congestion and sore throat.    Respiratory:  Negative for shortness of breath and wheezing.    Cardiovascular:  Negative for chest pain.   Gastrointestinal:  Negative for abdominal pain, nausea and vomiting.   Neurological:  Negative for headaches.   All other systems reviewed and are negative.      Physical Exam  Physical Exam  Vitals and nursing note reviewed.   Constitutional:       General: He is not in acute distress.     Appearance: He is well-developed. He is obese. He is not ill-appearing, toxic-appearing or diaphoretic.   HENT:      Head: Normocephalic and atraumatic.      Nose: Nose normal.   Eyes:      Extraocular Movements: Extraocular movements intact.      Conjunctiva/sclera: Conjunctivae normal.   Cardiovascular:      Rate and Rhythm: Normal rate and regular rhythm.      Heart sounds: Murmur heard.   Pulmonary:      Effort: Pulmonary effort is normal. No respiratory distress.      Breath sounds: Normal breath sounds.   Abdominal:      General: There is no distension.      Palpations: Abdomen is soft.      Tenderness: There is no abdominal tenderness.      Hernia: A hernia (small umbilical hernia) is present.   Musculoskeletal:         General: Deformity present. Normal range of motion.      Cervical back: Neck supple.      Comments: Base of right great toe with healing diabetic foot wound   Skin:     General: Skin is warm and dry.    Neurological:      General: No focal deficit present.      Mental Status: He is alert and oriented to person, place, and time.      Cranial Nerves: No cranial nerve deficit.   Psychiatric:         Mood and Affect: Mood normal.         Vital Signs  ED Triage Vitals   Temperature Pulse Respirations Blood Pressure SpO2   08/20/24 1323 08/20/24 1323 08/20/24 1323 08/20/24 1323 08/20/24 1323   97.7 °F (36.5 °C) 82 20 132/73 100 %      Temp src Heart Rate Source Patient Position - Orthostatic VS BP Location FiO2 (%)   -- 08/20/24 1430 08/20/24 1323 08/20/24 1323 --    Monitor Sitting Left arm       Pain Score       --                  Vitals:    08/20/24 1323 08/20/24 1430   BP: 132/73 116/73   Pulse: 82 82   Patient Position - Orthostatic VS: Sitting          Visual Acuity  Visual Acuity      Flowsheet Row Most Recent Value   L Pupil Size (mm) 3   R Pupil Size (mm) 3            ED Medications  Medications - No data to display    Diagnostic Studies  Results Reviewed       Procedure Component Value Units Date/Time    FLU/RSV/COVID - if FLU/RSV clinically relevant [[id number]]  (Normal) Collected: 08/20/24 1417    Lab Status: Final result Specimen: Nares from Nose Updated: 08/20/24 1502     SARS-CoV-2 Negative     INFLUENZA A PCR Negative     INFLUENZA B PCR Negative     RSV PCR Negative    Narrative:      This test has been performed using the CoV-2/Flu/RSV plus assay on the [employer] GeneXpert platform. This test has been validated by the  and verified by the performing laboratory.     This test is designed to amplify and detect the following: nucleocapsid (N), envelope (E), and RNA-dependent RNA polymerase (RdRP) genes of the SARS-CoV-2 genome; matrix (M), basic polymerase (PB2), and acidic protein (PA) segments of the influenza A genome; matrix (M) and non-structural protein (NS) segments of the influenza B genome, and the nucleocapsid genes of RSV A and RSV B.     Positive results are indicative of the  presence of Flu A, Flu B, RSV, and/or SARS-CoV-2 RNA. Positive results for SARS-CoV-2 or suspected novel influenza should be reported to state, local, or federal health departments according to local reporting requirements.      All results should be assessed in conjunction with clinical presentation and other laboratory markers for clinical management.     FOR PEDIATRIC PATIENTS - copy/paste COVID Guidelines URL to browser: [email or web address]       Comprehensive metabolic panel [[id number]]  (Abnormal) Collected: 08/20/24 1417    Lab Status: Final result Specimen: Blood from Arm, Left Updated: 08/20/24 1454     Sodium 137 mmol/L      Potassium 5.0 mmol/L      Chloride 97 mmol/L      CO2 30 mmol/L      ANION GAP 10 mmol/L      BUN 39 mg/dL      Creatinine 1.36 mg/dL      Glucose 137 mg/dL      Calcium 10.3 mg/dL       U/L       U/L      Alkaline Phosphatase 116 U/L      Total Protein 7.7 g/dL      Albumin 3.8 g/dL      Total Bilirubin 0.30 mg/dL      eGFR 53 ml/min/1.73sq m     Narrative:      National Kidney Disease Foundation guidelines for Chronic Kidney Disease (CKD):     Stage 1 with normal or high GFR (GFR > 90 mL/min/1.73 square meters)    Stage 2 Mild CKD (GFR = 60-89 mL/min/1.73 square meters)    Stage 3A Moderate CKD (GFR = 45-59 mL/min/1.73 square meters)    Stage 3B Moderate CKD (GFR = 30-44 mL/min/1.73 square meters)    Stage 4 Severe CKD (GFR = 15-29 mL/min/1.73 square meters)    Stage 5 End Stage CKD (GFR <15 mL/min/1.73 square meters)  Note: GFR calculation is accurate only with a steady state creatinine    Magnesium [[id number]]  (Normal) Collected: 08/20/24 1417    Lab Status: Final result Specimen: Blood from Arm, Left Updated: 08/20/24 1454     Magnesium 2.2 mg/dL     C-reactive protein [[id number]]  (Abnormal) Collected: 08/20/24 1417    Lab Status: Final result Specimen: Blood from Arm, Left Updated: 08/20/24 1454     .0  mg/L     Lactic acid, plasma (w/reflex if result > 2.0) [[id number]]  (Normal) Collected: 08/20/24 1417    Lab Status: Final result Specimen: Blood from Arm, Left Updated: 08/20/24 1443     LACTIC ACID 1.7 mmol/L     Narrative:      Result may be elevated if tourniquet was used during collection.    Blood culture #1 [[id number]] Collected: 08/20/24 1430    Lab Status: In process Specimen: Blood from Arm, Right Updated: 08/20/24 1436    Blood culture [[id number]] Collected: 08/20/24 1432    Lab Status: In process Specimen: Blood from Arm, Right Updated: 08/20/24 1436    Sedimentation rate, automated [[id number]]  (Abnormal) Collected: 08/20/24 1417    Lab Status: Final result Specimen: Blood from Arm, Left Updated: 08/20/24 1427     Sed Rate 75 mm/hour     CBC and differential [[id number]]  (Abnormal) Collected: 08/20/24 1417    Lab Status: Final result Specimen: Blood from Arm, Left Updated: 08/20/24 1426     WBC 9.61 Thousand/uL      RBC 4.12 Million/uL      Hemoglobin 10.5 g/dL      Hematocrit 33.8 %      MCV 82 fL      MCH 25.5 pg      MCHC 31.1 g/dL      RDW 16.4 %      MPV 9.8 fL      Platelets 245 Thousands/uL      nRBC 0 /100 WBCs      Segmented % 81 %      Immature Grans % 1 %      Lymphocytes % 6 %      Monocytes % 8 %      Eosinophils Relative 3 %      Basophils Relative 1 %      Absolute Neutrophils 7.88 Thousands/µL      Absolute Immature Grans 0.05 Thousand/uL      Absolute Lymphocytes 0.61 Thousands/µL      Absolute Monocytes 0.76 Thousand/µL      Eosinophils Absolute 0.25 Thousand/µL      Basophils Absolute 0.06 Thousands/µL     Blood culture #2 [[id number]] Collected: 08/20/24 1417    Lab Status: In process Specimen: Blood from Arm, Left Updated: 08/20/24 1422    UA w Reflex to Microscopic w Reflex to Culture [[id number]]     Lab Status: No result Specimen: Urine     Urine culture [[id number]]     Lab Status: No result Specimen: Urine, Clean Catch                    XR foot 3+ views RIGHT   ED Interpretation  by Ioana Garvin MD (08/20 1513)   NAD      XR chest 2 views   ED Interpretation by Ioana Garvin MD (08/20 1512)   NAD                 Procedures  CriticalCare Time    Date/Time: 8/20/2024 3:31 PM    Performed by: Ioana Garvin MD  Authorized by: Ioana Garvin MD    Critical care provider statement:     Critical care time (minutes):  35    Critical care time was exclusive of:  Separately billable procedures and treating other patients and teaching time    Critical care was necessary to treat or prevent imminent or life-threatening deterioration of the following conditions:  CNS failure or compromise    Critical care was time spent personally by me on the following activities:  Obtaining history from patient or surrogate, development of treatment plan with patient or surrogate, discussions with consultants, examination of patient, ordering and performing treatments and interventions, ordering and review of laboratory studies and review of old charts           ED Course  ED Course as of 08/20/24 1531   Tue Aug 20, 2024   1426 D/w neurosurg and recommending a collar for now, and if wbc nml and not septic, would wait on abx and tx to [facility]   1511 C-REACTIVE PROTEIN(!): 221.0   1511 WBC: 9.61   1511 Sed Rate(!): 75                                 SBIRT 20yo+      Flowsheet Row Most Recent Value   Initial Alcohol Screen: US AUDIT-C     1. How often do you have a drink containing alcohol? 0 Filed at: 08/20/2024 1444   2. How many drinks containing alcohol do you have on a typical day you are drinking?  0 Filed at: 08/20/2024 1444   3a. Male UNDER 65: How often do you have five or more drinks on one occasion? 0 Filed at: 08/20/2024 1444   3b. FEMALE Any Age, or MALE 65+: How often do you have 4 or more drinks on one occassion? 0 Filed at: 08/20/2024 1444   Audit-C Score 0 Filed at: 08/20/2024 1444   MARVIN: How many times in the past year have you...    Used an illegal drug or used a prescription  medication for non-medical reasons? Never Filed at: 08/20/2024 1444                      Medical Decision Making  68-year-old male is coming in from outpatient for abnormal cervical MRI.  Patient reports he has been having neck pain with stiffness that radiated from his neck into his upper shoulders for the past 5 to 6 weeks.  Was recently mid to the hospital on 728 for diabetic foot wound and sepsis and was on antibiotics when in the hospital but not currently and has not been on antibiotics for the last 2 weeks.  Was still having neck discomfort so got an MRI as an outpatient and was told to come to the hospital because he had an infection in his neck.  Outpatient MRI shows cervical discitis with potential phlegmon versus small epidural abscess.  Review of his last admission patient had positive blood cultures for Staph aureus and his pain has been during the duration so his likely source is his diabetic foot and Staph aureus since he had bacteremia.  Will put in blood work with repeat blood cultures sed rate CRP reach out discussed with neurosurgery whether to give antibiotics to cover staph at present or wait further evaluation and put in for potential transfer to [facility].    Amount and/or Complexity of Data Reviewed  External Data Reviewed: labs and radiology.     Details: Pt had positive staph blood cx last admission 7/28 and was on abx for 7 days until 8/3 and had outpt MRI on neck    Labs: ordered. Decision-making details documented in ED Course.  Radiology: ordered and independent interpretation performed.                 Disposition  Final diagnoses:   Cervical discitis     Time reflects when diagnosis was documented in both MDM as applicable and the Disposition within this note       Time User Action Codes Description Comment    8/20/2024  2:34 PM Ioana Garvin Add [M46.42] Cervical discitis           ED Disposition       ED Disposition   Transfer to Another Facility-In Network     Condition   --    Date/Time   Tue Aug 20, 2024 1433    Comment   Augustus IRENA Coffman should be transferred out to [facility].               MD Documentation      Flowsheet Row Most Recent Value   Patient Condition The patient has been stabilized such that within reasonable medical probability, no material deterioration of the patient condition or the condition of the unborn child(jane) is likely to result from the transfer   Reason for Transfer Level of Care needed not available at this facility   Benefits of Transfer Specialized equipment and/or services available at the receiving facility (Include comment)________________________   Risks of Transfer Potential for delay in receiving treatment   Accepting Physician Cari Sarabia   Accepting Facility Name, [facility]    (Name & Tel number) PACS   Sending MD Garvin          RN Documentation      Flowsheet Row Most Recent Value   Accepting Facility Name, [facility]    (Name & Tel number) PACS          Follow-up Information    None         Patient's Medications   Discharge Prescriptions    No medications on file       Outpatient Discharge Orders   Cervical Collar [place]       PDMP Review         Value Time User    PDMP Reviewed  Yes 7/11/2024  5:15 AM Carlito Oliver MD            ED Provider  Electronically Signed by             Ioana Garvin MD  08/20/24 7792

## 2024-08-20 NOTE — H&P
"[facility]  H&P  Name: Augustus Coffman 68 y.o. male I MRN: [id number]  Unit/Bed#: -01 I Date of Admission: 8/20/2024   Date of Service: 8/20/2024 I Hospital Day: 0      Assessment & Plan   * Cervical discitis  Assessment & Plan  Admitted to [facility] in July for infected diabetic foot ulcer  Completed course of antibiotics   During that admission patient was reporting neck pain  Suspected to be musculoskeletal, had ortho outpatient follow up pending  Improved with toradol and zanaflex  Followed up with Ortho and MRI cervical spine was ordered  Shows diskitis/myelitis with phlegmonous changes  Initially was accepted for transfer to [facility] today  But no surgical intervention was planned  ID recommended long term antibiotics  So patient admitted to [facility]  Consult ID and follow up infectious work up     Acute kidney failure (HCC)  Assessment & Plan  Likely pre renal in setting of home diuretics  Hold HCTZ and losartan  Give IV fluid bolus  Follow up creatinine in AM    Transaminitis  Assessment & Plan  Asymptomatic  Check RUQ US in setting of persistent infection  Trend LFT's    Diabetes 1.5, managed as type 2 (HCC)  Assessment & Plan  Lab Results   Component Value Date    HGBA1C 6.8 (H) 07/05/2024       No results for input(s): \"POCGLU\" in the last 72 hours.    Blood Sugar Average: Last 72 hrs:  Start sliding scale  Monitor glucose levels              VTE Pharmacologic Prophylaxis:   Moderate Risk (Score 3-4) - Pharmacological DVT Prophylaxis Ordered: heparin.  Code Status: Level 1 - Full Code   Discussion with family: Updated  (wife) at bedside.    Anticipated Length of Stay: Patient will be admitted on an inpatient basis with an anticipated length of stay of greater than 2 midnights secondary to IV antibiotics .    Total Time Spent on Date of Encounter in care of patient: 30+ mins. This time was spent on one or more of the following: performing physical exam; " counseling and coordination of care; obtaining or reviewing history; documenting in the medical record; reviewing/ordering tests, medications or procedures; communicating with other healthcare professionals and discussing with patient's family/caregivers.    Chief Complaint: neck pain    History of Present Illness:  Augustus Coffman is a 68 y.o. male with a PMH of diabetic foot wound, diabetes, htn who presents with abnormal MRI cervical spine. Patient was admitted to [facility] for diabetic foot wound in July. At the time patient was reporting neck pain which was suspected to be secondary to musculoskeletal pain. He had follow up with orthopedic surgery as an outpatient and MRI cervical spine was ordered which showed osteomyelitis/diskitis. He was referred to the ED and admitted to [facility] for further management.    Review of Systems:  Review of Systems   Constitutional:  Negative for chills and fever.   HENT:  Negative for ear pain and sore throat.    Eyes:  Negative for pain and visual disturbance.   Respiratory:  Negative for cough and shortness of breath.    Cardiovascular:  Negative for chest pain and palpitations.   Gastrointestinal:  Negative for abdominal pain and vomiting.   Genitourinary:  Negative for dysuria and hematuria.   Musculoskeletal:  Positive for neck pain and neck stiffness. Negative for arthralgias and back pain.   Skin:  Negative for color change and rash.   Neurological:  Negative for seizures and syncope.   All other systems reviewed and are negative.      Past Medical and Surgical History:   Past Medical History:   Diagnosis Date    Arthritis 2010's    Occasionally flares up    Cancer (HCC) May 2021    Still investigating    Chronic kidney disease     Diabetes mellitus (HCC)     Follicular lymphoma (HCC)     GERD (gastroesophageal reflux disease) June 2021    Noticed in an Endoscopy    Heart murmur May 2020    High cholesterol     Hypertension     Kidney stone 1980's    Have had since 1980's     Obesity Since Childhood       Past Surgical History:   Procedure Laterality Date    BUNIONECTOMY Right 11/24/2020    Procedure: RIGHT HAV CORRECTION,;  Surgeon: Niranjan Child DPM;  Location: [facility];  Service: Podiatry    COLONOSCOPY      INCISION AND DRAINAGE OF WOUND Right 10/05/2016    Procedure: INCISION AND DRAINAGE (I&D) EXTREMITY;  Surgeon: Niranjan Child DPM;  Location: [facility];  Service:     IR BIOPSY LYMPH NODE  07/08/2021    IR EMBOLIZATION (SPECIFY VESSEL OR SITE)  07/08/2021    IR PORT PLACEMENT  9/1/2022    PILONIDAL CYST EXCISION      VT CORRECTION HAMMERTOE Right 02/19/2019    Procedure: THIRD HAMMER TOE CORRECTION;  Surgeon: Niranjan Child DPM;  Location: [facility];  Service: Podiatry    VT RMVL MATEO CTR VAD W/SUBQ PORT/ CTR/PRPH INSJ N/A 3/14/2023    Procedure: REMOVAL VENOUS PORT (PORT-A-CATH)IR;  Surgeon: Avelino Vázquez DO;  [facility];  Service: Interventional Radiology    TOE OSTEOTOMY Right 03/14/2017    Procedure: HAMMERTOE CORRECTION R 2 ;  Surgeon: Niranjan Child DPM;  [facility];  Service:     TOE OSTEOTOMY Left 11/24/2020    Procedure: LEFT HT CORRECTION TOE;  Surgeon: Niranjan Child DPM;  [facility];  Service: Podiatry    TONSILLECTOMY  1963       Meds/Allergies:  Prior to Admission medications    Medication Sig Start Date End Date Taking? Authorizing Provider   allopurinol (ZYLOPRIM) 300 mg tablet Take 1 tablet (300 mg total) by mouth daily 4/12/24   Gwen Lazo DO   amLODIPine (NORVASC) 10 mg tablet Take 1 tablet (10 mg total) by mouth daily 4/12/24   Gwen Lazo DO   aspirin 81 mg chewable tablet Chew 81 mg daily    Historical Provider, MD   Cholecalciferol 100 MCG (4000 UT) CAPS Take 1 capsule (4,000 Units total) by mouth in the morning 4/22/24   Jorden Heath MD   cyclobenzaprine (FLEXERIL) 10 mg tablet Take 1 tablet (10 mg total) by mouth 3 (three) times a day as needed for muscle spasms 7/26/24   Gwen Lazo DO   Diclofenac  Sodium (VOLTAREN) 1 % Apply 2 g topically 4 (four) times a day  Patient not taking: Reported on 8/12/2024 7/12/24   Ricardo Jacques, DO   Diclofenac Sodium (VOLTAREN) 1 % Apply 2 g topically 4 (four) times a day For pain to affected area  Patient not taking: Reported on 8/12/2024 7/26/24   Irineo Rome PA-C   Diclofenac Sodium (VOLTAREN) 1 % Apply 2 g topically 4 (four) times a day 8/3/24   Yogesh Stewart MD   Empagliflozin (Jardiance) 25 MG TABS TAKE 1 TABLET BY MOUTH EVERY DAY IN THE MORNING 7/2/24   Jorden Heath MD   hydroCHLOROthiazide 25 mg tablet Take 1 tablet (25 mg total) by mouth daily 4/12/24   Gwen Lazo DO   ketorolac (TORADOL) 10 mg tablet Take 0.5 tablets (5 mg total) by mouth every 6 (six) hours as needed for moderate pain for up to 5 days  Patient not taking: Reported on 8/12/2024 8/12/24 8/17/24  Gwen Lazo DO   lidocaine (Lidoderm) 5 % Apply 1 patch topically over 12 hours daily for 14 days Remove & Discard patch within 12 hours or as directed by MD 7/11/24 7/25/24  Carlito Oliver MD   lidocaine (LMX) 4 % cream Apply topically as needed for mild pain 7/26/24   Irineo Rome PA-C   losartan (COZAAR) 100 MG tablet Take 1 tablet (100 mg total) by mouth daily 4/12/24   Gwen Lazo DO   metFORMIN (GLUCOPHAGE) 500 mg tablet TAKE 2 TABLETS BY MOUTH TWICE A DAY WITH FOOD 5/27/24   Gwen Lazo DO   metoprolol succinate (TOPROL-XL) 100 mg 24 hr tablet Take 1 tablet (100 mg total) by mouth every evening 4/12/24   Gwen Lazo, DO   Multiple Vitamins-Minerals (Centrum Silver 50+Men) TABS     Historical Provider, MD   oxyCODONE (Roxicodone) 5 immediate release tablet Take 1 tablet (5 mg total) by mouth every 6 (six) hours as needed for moderate pain for up to 7 doses Max Daily Amount: 20 mg  Patient not taking: Reported on 8/12/2024 7/11/24   Carlito Oliver MD   rosuvastatin (CRESTOR) 40 MG tablet Take 1 tablet (40 mg total) by mouth every evening 5/13/24   Gwen Lazo DO    tiZANidine (ZANAFLEX) 4 mg tablet Take 1 tablet (4 mg total) by mouth 3 (three) times a day for 14 days 8/19/24 9/2/24  Gwen Lazo DO     I have reviewed home medications using recent Epic encounter.    Allergies:   Allergies   Allergen Reactions    Lisinopril Cough       Social History:  Marital Status: /Civil Union   Occupation: na  Patient Pre-hospital Living Situation: Home  Patient Pre-hospital Level of Mobility: walks  Patient Pre-hospital Diet Restrictions: diabetic diet  Substance Use History:   Social History     Substance and Sexual Activity   Alcohol Use Never     Social History     Tobacco Use   Smoking Status Never   Smokeless Tobacco Never   Tobacco Comments    Never smoked but exposed to second hand smoke from birth until 1980's     Social History     Substance and Sexual Activity   Drug Use Never       Family History:  Family History   Problem Relation Age of Onset    Cancer Father         Leukemia       Physical Exam:     Vitals:   Blood Pressure: 116/73 (08/20/24 1430)  Pulse: 82 (08/20/24 1430)  Temperature: 97.7 °F (36.5 °C) (08/20/24 1323)  Respirations: 16 (08/20/24 1430)  SpO2: 98 % (08/20/24 1430)    Physical Exam  Constitutional:       General: He is not in acute distress.     Appearance: Normal appearance. He is not toxic-appearing.   Cardiovascular:      Rate and Rhythm: Normal rate and regular rhythm.      Heart sounds: Normal heart sounds. No murmur heard.  Pulmonary:      Effort: Pulmonary effort is normal. No respiratory distress.      Breath sounds: Normal breath sounds. No wheezing.   Abdominal:      General: Abdomen is flat. There is no distension.      Palpations: Abdomen is soft.      Tenderness: There is no abdominal tenderness.   Neurological:      General: No focal deficit present.      Mental Status: He is alert and oriented to person, place, and time. Mental status is at baseline.      Motor: No weakness.          Additional Data:     Lab Results:  Results from  last 7 days   Lab Units 08/20/24  1417   WBC Thousand/uL 9.61   HEMOGLOBIN g/dL 10.5*   HEMATOCRIT % 33.8*   PLATELETS Thousands/uL 245   SEGS PCT % 81*   LYMPHO PCT % 6*   MONO PCT % 8   EOS PCT % 3     Results from last 7 days   Lab Units 08/20/24  1417   SODIUM mmol/L 137   POTASSIUM mmol/L 5.0   CHLORIDE mmol/L 97   CO2 mmol/L 30   BUN mg/dL 39*   CREATININE mg/dL 1.36*   ANION GAP mmol/L 10   CALCIUM mg/dL 10.3*   ALBUMIN g/dL 3.8   TOTAL BILIRUBIN mg/dL 0.30   ALK PHOS U/L 116*   ALT U/L 118*   AST U/L 107*   GLUCOSE RANDOM mg/dL 137             Lab Results   Component Value Date    HGBA1C 6.8 (H) 07/05/2024    HGBA1C 6.7 (H) 03/02/2024    HGBA1C 8.4 (H) 11/20/2023     Results from last 7 days   Lab Units 08/20/24  1417   LACTIC ACID mmol/L 1.7       Lines/Drains:  Invasive Devices       Peripheral Intravenous Line  Duration             Peripheral IV 08/20/24 Left;Proximal;Ventral (anterior) Forearm <1 day    Peripheral IV 08/20/24 Right;Ventral (anterior) Forearm <1 day                        Imaging: Reviewed radiology reports from this admission including: MRI spine  XR foot 3+ views RIGHT   ED Interpretation by Ioana Garvin MD (08/20 1513)   NAD      Final Result by Alla Sunshine MD (08/20 1552)   No acute displaced fracture or dislocation   Stable hardware with healed osteotomy of the first metatarsal      Computerized Assisted Algorithm (CAA) may have been used to analyze all applicable images.         Workstation performed: [id number]         XR chest 2 views   ED Interpretation by Ioana Garvin MD (08/20 1512)   NAD      Final Result by Alla Sunshine MD (08/20 1652)      No acute consolidation or congestion            Workstation performed: [id number]         US right upper quadrant    (Results Pending)       EKG and Other Studies Reviewed on Admission:   EKG: No EKG obtained.    ** Please Note: This note has been constructed using a voice recognition system. **

## 2024-08-20 NOTE — NURSING NOTE
Patient arrived to [facility] without cervical collar on. Patient states he took it off due to discomfort. Provider notified.

## 2024-08-21 ENCOUNTER — APPOINTMENT (INPATIENT)
Dept: ULTRASOUND IMAGING | Facility: HOSPITAL | Age: 69
DRG: 551 | End: 2024-08-21
Payer: MEDICARE

## 2024-08-21 LAB
ALBUMIN SERPL BCG-MCNC: 3.7 G/DL (ref 3.5–5)
ALP SERPL-CCNC: 157 U/L (ref 34–104)
ALT SERPL W P-5'-P-CCNC: 135 U/L (ref 7–52)
ANION GAP SERPL CALCULATED.3IONS-SCNC: 11 MMOL/L (ref 4–13)
AST SERPL W P-5'-P-CCNC: 120 U/L (ref 13–39)
BACTERIA UR QL AUTO: NORMAL /HPF
BILIRUB DIRECT SERPL-MCNC: 0.06 MG/DL (ref 0–0.2)
BILIRUB SERPL-MCNC: 0.51 MG/DL (ref 0.2–1)
BILIRUB UR QL STRIP: NEGATIVE
BUN SERPL-MCNC: 27 MG/DL (ref 5–25)
CALCIUM SERPL-MCNC: 9.7 MG/DL (ref 8.4–10.2)
CHLORIDE SERPL-SCNC: 99 MMOL/L (ref 96–108)
CLARITY UR: CLEAR
CO2 SERPL-SCNC: 26 MMOL/L (ref 21–32)
COLOR UR: ABNORMAL
CREAT SERPL-MCNC: 1.03 MG/DL (ref 0.6–1.3)
ERYTHROCYTE [DISTWIDTH] IN BLOOD BY AUTOMATED COUNT: 16.5 % (ref 11.6–15.1)
GFR SERPL CREATININE-BSD FRML MDRD: 74 ML/MIN/1.73SQ M
GLUCOSE SERPL-MCNC: 125 MG/DL (ref 65–140)
GLUCOSE SERPL-MCNC: 135 MG/DL (ref 65–140)
GLUCOSE SERPL-MCNC: 164 MG/DL (ref 65–140)
GLUCOSE SERPL-MCNC: 166 MG/DL (ref 65–140)
GLUCOSE SERPL-MCNC: 169 MG/DL (ref 65–140)
GLUCOSE UR STRIP-MCNC: ABNORMAL MG/DL
HCT VFR BLD AUTO: 37 % (ref 36.5–49.3)
HGB BLD-MCNC: 11.7 G/DL (ref 12–17)
HGB UR QL STRIP.AUTO: NEGATIVE
KETONES UR STRIP-MCNC: NEGATIVE MG/DL
LEUKOCYTE ESTERASE UR QL STRIP: ABNORMAL
MAGNESIUM SERPL-MCNC: 2 MG/DL (ref 1.9–2.7)
MCH RBC QN AUTO: 25.5 PG (ref 26.8–34.3)
MCHC RBC AUTO-ENTMCNC: 31.6 G/DL (ref 31.4–37.4)
MCV RBC AUTO: 81 FL (ref 82–98)
NITRITE UR QL STRIP: NEGATIVE
NON-SQ EPI CELLS URNS QL MICRO: NORMAL /HPF
PH UR STRIP.AUTO: 7 [PH]
PLATELET # BLD AUTO: 270 THOUSANDS/UL (ref 149–390)
PMV BLD AUTO: 9.9 FL (ref 8.9–12.7)
POTASSIUM SERPL-SCNC: 4.6 MMOL/L (ref 3.5–5.3)
PROT SERPL-MCNC: 7.8 G/DL (ref 6.4–8.4)
PROT UR STRIP-MCNC: ABNORMAL MG/DL
RBC # BLD AUTO: 4.59 MILLION/UL (ref 3.88–5.62)
RBC #/AREA URNS AUTO: NORMAL /HPF
SODIUM SERPL-SCNC: 136 MMOL/L (ref 135–147)
SP GR UR STRIP.AUTO: 1.02 (ref 1–1.03)
UROBILINOGEN UR STRIP-ACNC: <2 MG/DL
WBC # BLD AUTO: 13.91 THOUSAND/UL (ref 4.31–10.16)
WBC #/AREA URNS AUTO: NORMAL /HPF

## 2024-08-21 PROCEDURE — 99223 1ST HOSP IP/OBS HIGH 75: CPT | Performed by: INTERNAL MEDICINE

## 2024-08-21 PROCEDURE — 87077 CULTURE AEROBIC IDENTIFY: CPT | Performed by: EMERGENCY MEDICINE

## 2024-08-21 PROCEDURE — 80076 HEPATIC FUNCTION PANEL: CPT | Performed by: STUDENT IN AN ORGANIZED HEALTH CARE EDUCATION/TRAINING PROGRAM

## 2024-08-21 PROCEDURE — 87147 CULTURE TYPE IMMUNOLOGIC: CPT | Performed by: EMERGENCY MEDICINE

## 2024-08-21 PROCEDURE — 82948 REAGENT STRIP/BLOOD GLUCOSE: CPT

## 2024-08-21 PROCEDURE — 99233 SBSQ HOSP IP/OBS HIGH 50: CPT | Performed by: STUDENT IN AN ORGANIZED HEALTH CARE EDUCATION/TRAINING PROGRAM

## 2024-08-21 PROCEDURE — 81001 URINALYSIS AUTO W/SCOPE: CPT | Performed by: EMERGENCY MEDICINE

## 2024-08-21 PROCEDURE — 87186 SC STD MICRODIL/AGAR DIL: CPT | Performed by: EMERGENCY MEDICINE

## 2024-08-21 PROCEDURE — 76705 ECHO EXAM OF ABDOMEN: CPT

## 2024-08-21 PROCEDURE — 83735 ASSAY OF MAGNESIUM: CPT | Performed by: STUDENT IN AN ORGANIZED HEALTH CARE EDUCATION/TRAINING PROGRAM

## 2024-08-21 PROCEDURE — 80048 BASIC METABOLIC PNL TOTAL CA: CPT | Performed by: STUDENT IN AN ORGANIZED HEALTH CARE EDUCATION/TRAINING PROGRAM

## 2024-08-21 PROCEDURE — 87086 URINE CULTURE/COLONY COUNT: CPT | Performed by: EMERGENCY MEDICINE

## 2024-08-21 PROCEDURE — 97165 OT EVAL LOW COMPLEX 30 MIN: CPT

## 2024-08-21 PROCEDURE — 97161 PT EVAL LOW COMPLEX 20 MIN: CPT

## 2024-08-21 PROCEDURE — 85027 COMPLETE CBC AUTOMATED: CPT | Performed by: STUDENT IN AN ORGANIZED HEALTH CARE EDUCATION/TRAINING PROGRAM

## 2024-08-21 RX ORDER — TIZANIDINE 2 MG/1
4 TABLET ORAL EVERY 8 HOURS PRN
Status: DISCONTINUED | OUTPATIENT
Start: 2024-08-21 | End: 2024-08-23 | Stop reason: HOSPADM

## 2024-08-21 RX ADMIN — HYDROMORPHONE HYDROCHLORIDE 1 MG: 1 INJECTION, SOLUTION INTRAMUSCULAR; INTRAVENOUS; SUBCUTANEOUS at 21:32

## 2024-08-21 RX ADMIN — METOPROLOL SUCCINATE 50 MG: 50 TABLET, EXTENDED RELEASE ORAL at 17:29

## 2024-08-21 RX ADMIN — DOCUSATE SODIUM 100 MG: 100 CAPSULE, LIQUID FILLED ORAL at 17:29

## 2024-08-21 RX ADMIN — HYDROMORPHONE HYDROCHLORIDE 1 MG: 1 INJECTION, SOLUTION INTRAMUSCULAR; INTRAVENOUS; SUBCUTANEOUS at 16:30

## 2024-08-21 RX ADMIN — HEPARIN SODIUM 5000 UNITS: 5000 INJECTION INTRAVENOUS; SUBCUTANEOUS at 14:27

## 2024-08-21 RX ADMIN — CEFAZOLIN SODIUM 2000 MG: 2 SOLUTION INTRAVENOUS at 16:23

## 2024-08-21 RX ADMIN — INSULIN LISPRO 1 UNITS: 100 INJECTION, SOLUTION INTRAVENOUS; SUBCUTANEOUS at 08:17

## 2024-08-21 RX ADMIN — INSULIN LISPRO 1 UNITS: 100 INJECTION, SOLUTION INTRAVENOUS; SUBCUTANEOUS at 11:23

## 2024-08-21 RX ADMIN — HEPARIN SODIUM 5000 UNITS: 5000 INJECTION INTRAVENOUS; SUBCUTANEOUS at 21:34

## 2024-08-21 RX ADMIN — CEFAZOLIN SODIUM 2000 MG: 2 SOLUTION INTRAVENOUS at 00:49

## 2024-08-21 RX ADMIN — HYDROMORPHONE HYDROCHLORIDE 1 MG: 1 INJECTION, SOLUTION INTRAMUSCULAR; INTRAVENOUS; SUBCUTANEOUS at 05:26

## 2024-08-21 RX ADMIN — HYDROMORPHONE HYDROCHLORIDE 1 MG: 1 INJECTION, SOLUTION INTRAMUSCULAR; INTRAVENOUS; SUBCUTANEOUS at 11:04

## 2024-08-21 RX ADMIN — HEPARIN SODIUM 5000 UNITS: 5000 INJECTION INTRAVENOUS; SUBCUTANEOUS at 05:26

## 2024-08-21 RX ADMIN — ACETAMINOPHEN 1000 MG: 10 INJECTION INTRAVENOUS at 17:30

## 2024-08-21 RX ADMIN — HYDROMORPHONE HYDROCHLORIDE 1 MG: 1 INJECTION, SOLUTION INTRAMUSCULAR; INTRAVENOUS; SUBCUTANEOUS at 00:49

## 2024-08-21 RX ADMIN — ACETAMINOPHEN 1000 MG: 10 INJECTION INTRAVENOUS at 11:40

## 2024-08-21 RX ADMIN — CEFAZOLIN SODIUM 2000 MG: 2 SOLUTION INTRAVENOUS at 08:19

## 2024-08-21 RX ADMIN — ACETAMINOPHEN 1000 MG: 10 INJECTION INTRAVENOUS at 23:33

## 2024-08-21 RX ADMIN — ATORVASTATIN CALCIUM 80 MG: 40 TABLET, FILM COATED ORAL at 16:22

## 2024-08-21 RX ADMIN — ACETAMINOPHEN 1000 MG: 10 INJECTION INTRAVENOUS at 05:26

## 2024-08-21 NOTE — PROGRESS NOTES
Patient:    MRN:  [id number]    Aidin Request ID:  [id number]    Level of care reserved:  Home Health Agency    Partner Reserved:  [facility] , [place] [address] [phone]    Clinical needs requested:    Geography searched:  [phone]    Start of Service:    Request sent:  11:27am EDT on 8/21/2024 by Sherri Mehta    Partner reserved:  4:39pm EDT on 8/21/2024 by Sherri Mehta    Choice list shared:

## 2024-08-21 NOTE — PLAN OF CARE
Problem: INFECTION - ADULT  Goal: Absence of fever/infection during neutropenic period  Description: INTERVENTIONS:  - Monitor WBC    Outcome: Progressing     Problem: DISCHARGE PLANNING  Goal: Discharge to home or other facility with appropriate resources  Description: INTERVENTIONS:  - Identify barriers to discharge w/patient and caregiver  - Arrange for needed discharge resources and transportation as appropriate  - Identify discharge learning needs (meds, wound care, etc.)  - Arrange for interpretive services to assist at discharge as needed  - Refer to Case Management Department for coordinating discharge planning if the patient needs post-hospital services based on physician/advanced practitioner order or complex needs related to functional status, cognitive ability, or social support system  Outcome: Progressing

## 2024-08-21 NOTE — QUICK NOTE
1/2 blood culture collected on 08/20/24 @ 1417 positive for positive cocci in clusters.     Patient currently on cefazolin started by primary team.  Blood cultures also ordered for tomorrow.

## 2024-08-21 NOTE — CONSULTS
Consultation - Infectious Disease   Augustus IRENA Coffman 68 y.o. male MRN: [id number]  Unit/Bed#: -01 Encounter: [id number]    IMPRESSION & RECOMMENDATIONS:   Discitis/osteomyelitis of C5-C6 with associated 3mm epidural phlegmon/small abscess. Continued with neck pain post-discharge and followed-up with orthopedics in the outpatient. Demonstrated on MRI from 8/20. Likely in setting of #2 and #3. ESR (75) and CRP (221) both elevated. Per neurosurgery, no indication for surgical intervention at this time. Patient will need a prolonged IV antibiotic course for osteomyelitis. Will continue cefazolin given the likely pathogen is MSSA in setting of bacteremia as below.    Continue IV cefazolin 2g q8h  Appreciate neurosurgery recommendations  Will need prolonged IV antibiotic course for at least 6 weeks  May need repeat MRI prior to stopping antibiotics to ensure resolution of epidural abscess before completing antibiotic course  Continue to trend weekly ESR/CRP  Continue to follow CBCD and BMP with AM labs to monitor for potential antimicrobial toxicities and assess for clinical response to antibiotics.     MSSA bacteremia. Blood cultures positive 2 out of 2 for MSSA on 7/28 during last admission. Patient was treated with IV cefazolin for 7d course during last admission. TTE obtained at that time did not demonstrate evidence of valvular vegetation. Repeat blood cultures now also positive for GPCs in clusters in 2 out of 2 sets. No known indwelling hardware other than in his foot.  Continue antibiotics as above  Follow-up identification of GPCs in clusters, though this is likely also MSSA  Follow-up sensitivities  Obtain another TTE  Repeat blood cultures (2 sets, separate sites) tomorrow AM after receiving at least 48h of antibiotics  Blood cultures need to be negative for at least 72h prior to PICC line placement    Right diabetic foot wound. Recent admission form 7/28-8/3 for sepsis 2/2 to right diabetic foot wound.  Wound cultures positive for MSSA at that time. Likely source for #2 which led to development of #3. Now nearly completely healed.   Continue local wound care    T2DM. Most recent A1C 6.8. Remains a risk factor for development of infection and poor wound healing.   Continue with tight glycemic control.     FINA on CKD. Baseline appears to be between 0.9-1.1. Elevated to 1.36 on admission. Likely prerenal.  Avoid nephrotoxins  Will dose adjust antibiotics as needed  Continue to trend serum creatinine.     Transaminitis. LFTs elevated on admission (AST//118). Alk phos also elevated. RUQ U/S did not demonstrate any acute changes. Demonstrated gallstones with gallbladder sludge without evidence of acute cholecystitis.   Continue to monitor LFTs    Above plan was discussed in detail with patient at bedside.   Above plan was discussed in detail with primary service attending, who agrees with the plan to continue on IV cefazolin, obtain a repeat TTE and repeat blood cultures tomorrow morning.    HISTORY OF PRESENT ILLNESS:  Reason for Consult: 1. Osteomyelitis/discitis at C5-C6  HPI: Augustus Coffman is a 68 y.o. year old male with pmhx of diabetes c/b peripheral neuropathy and diabetic foot wound, HTN, CHF, stasis dermatitis of bilateral lower extremities, obesity, hypercholesterolemia, CKD, follicular lymphoma, who presented to [facility] after an outpatient MRI for neck pain revealed discitis/osteomyelitis at C5-C6 with a 3 mm epidural phlegmon/small abscess. Per my record review, patient was recently admitted to [facility] from 7/28/24 through 8/3/24 for a right diabetic foot wound. A wound culture at that time resulted positive for MSSA. Admission blood cultures from 7/28/24 also resulted positive 2 out of 2 sets for MSSA. Course was complicated by neck pain. A CT neck was unremarkable for infection at that time and neck pain was favored to be musculoskeletal. Infectious disease was not  consulted during this admission. Patient was treated with IV cefazolin for 7d then discharged off antibiotics. Patient was advised to follow-up with orthopedics in the outpatient setting for neck pain. Patient followed up with orthopedics on 8/13 in the outpatient setting and a MRI was ordered demonstrating discitis/osteomyelitis with concern for epidural abscess as above. Patient was referred to [place] ED for admission. Labs obtained upon admission demonstrate WBC WNL, elevated serum creatinine to 1.36, elevated LFTs (AST//118), and elevated alk phos to 116. Lactic acid WNL. Blood cultures were obtained and are pending. ESR elevated to 75 and CRP elevated at 221.0. Patient underwent RUQ U/S given elevated LFTs. No acute process seen. Demonstrated gallstones with gallbladder sludge without evidence of acute cholecystitis. Neurosurgery was consulted. Patient was started on IV cefazolin given positive blood cultures during last admission were for MSSA. ID is consulted for ongoing antibiotic management. Patient has remained afebrile and hemodynamically stable.     Per my discussion with the patient, he states that his neck pain is what brought him into the hospital last admission. The pain did not resolve with the IV antibiotics. He denies having any significant fevers or chills at home after discharge. Denies having any neurologic symptoms including numbness, tingling, or weakness. Endorses mild nausea, but denies vomiting or diarrhea. No CP or SOB. Patient states he has hardware in his right foot and potential coils in his back. No other indwelling hardware or devices.     REVIEW OF SYSTEMS:  A complete 12 point system-based review of systems is otherwise negative.    PAST MEDICAL HISTORY:  Past Medical History:   Diagnosis Date    Arthritis 2010's    Occasionally flares up    Cancer (HCC) May 2021    Still investigating    Chronic kidney disease     Diabetes mellitus (HCC)     Follicular lymphoma (HCC)      GERD (gastroesophageal reflux disease) June 2021    Noticed in an Endoscopy    Heart murmur May 2020    High cholesterol     Hypertension     Kidney stone 1980's    Have had since 1980's    Obesity Since Childhood     Past Surgical History:   Procedure Laterality Date    BUNIONECTOMY Right 11/24/2020    Procedure: RIGHT HAV CORRECTION,;  Surgeon: Niranjan Child DPM;  [facility];  Service: Podiatry    COLONOSCOPY      INCISION AND DRAINAGE OF WOUND Right 10/05/2016    Procedure: INCISION AND DRAINAGE (I&D) EXTREMITY;  Surgeon: Niranjan Child DPM;  Location[facility];  Service:     IR BIOPSY LYMPH NODE  07/08/2021    IR EMBOLIZATION (SPECIFY VESSEL OR SITE)  07/08/2021    IR PORT PLACEMENT  9/1/2022    PILONIDAL CYST EXCISION      NM CORRECTION HAMMERTOE Right 02/19/2019    Procedure: THIRD HAMMER TOE CORRECTION;  Surgeon: Niranjan Child DPM;  [facility];  Service: Podiatry    NM RMVL MATEO CTR VAD W/SUBQ PORT/ CTR/PRPH INSJ N/A 3/14/2023    Procedure: REMOVAL VENOUS PORT (PORT-A-CATH)IR;  Surgeon: Avelino Vázquez DO;  [facility];  Service: Interventional Radiology    TOE OSTEOTOMY Right 03/14/2017    Procedure: HAMMERTOE CORRECTION R 2 ;  Surgeon: Niranjan Child DPM;  [facility];  Service:     TOE OSTEOTOMY Left 11/24/2020    Procedure: LEFT HT CORRECTION TOE;  Surgeon: Niranjan Child DPM;  [facility];  Service: Podiatry    TONSILLECTOMY  1963       FAMILY HISTORY:  Non-contributory    SOCIAL HISTORY:  Social History   Social History     Substance and Sexual Activity   Alcohol Use Never     Social History     Substance and Sexual Activity   Drug Use Never     Social History     Tobacco Use   Smoking Status Never   Smokeless Tobacco Never   Tobacco Comments    Never smoked but exposed to second hand smoke from birth until 1980's       ALLERGIES:  Allergies   Allergen Reactions    Lisinopril Cough       MEDICATIONS:  All current active medications have been  reviewed.    ANTIBIOTICS:  Cefazolin    PHYSICAL EXAM:  Temp:  [97.7 °F (36.5 °C)-98.9 °F (37.2 °C)] 98.9 °F (37.2 °C)  HR:  [] 89  Resp:  [16-20] 17  BP: ()/() 97/65  SpO2:  [92 %-100 %] 93 %  Temp (24hrs), Av.4 °F (36.9 °C), Min:97.7 °F (36.5 °C), Max:98.9 °F (37.2 °C)  Current: Temperature: 98.9 °F (37.2 °C)    Intake/Output Summary (Last 24 hours) at 2024 0932  Last data filed at 2024 0511  Gross per 24 hour   Intake 240 ml   Output 500 ml   Net -260 ml       General Appearance:  Appearing well, nontoxic, and in no distress. Sitting upright in chair next to bed.    Head:  Normocephalic, without obvious abnormality, atraumatic.   Eyes:  Conjunctiva pink and sclera anicteric, both eyes.   Nose: Nares normal, mucosa normal, no drainage.   Throat: Oropharynx moist without lesions.   Neck: Supple, symmetrical, no adenopathy, midline tenderness with palpation.    Back:   Symmetric, ROM normal, no CVA tenderness.   Lungs:   Clear to auscultation bilaterally, respirations unlabored. On room air.    Chest Wall:  No tenderness or deformity.   Heart:  RRR; +murmur, rub or gallop.   Abdomen:   Soft, non-tender, non-distended, positive bowel sounds throughout.   Extremities: No cyanosis or clubbing, no edema. Chronic venous stasis changes bilaterally.    Skin: No rashes or lesions. Right foot wound wrapped in bandages. No drainage through bandages. Per media tab, right foot wound appears nearly fully healed. No surrounding erythema or evidence of active cellulitis.    Neurologic: Alert and oriented times 3, follows commands, speech is organized and appropriate, symmetric facial movement.     LABS, IMAGING, & OTHER STUDIES:  Lab Results:  I have personally reviewed pertinent labs.  Results from last 7 days   Lab Units 24  0521 24  1417   WBC Thousand/uL 13.91* 9.61   HEMOGLOBIN g/dL 11.7* 10.5*   PLATELETS Thousands/uL 270 245     Results from last 7 days   Lab Units 24  0576  08/20/24  1417   POTASSIUM mmol/L 4.6 5.0   CHLORIDE mmol/L 99 97   CO2 mmol/L 26 30   BUN mg/dL 27* 39*   CREATININE mg/dL 1.03 1.36*   EGFR ml/min/1.73sq m 74 53   CALCIUM mg/dL 9.7 10.3*   AST U/L 120* 107*   ALT U/L 135* 118*   ALK PHOS U/L 157* 116*     Results from last 7 days   Lab Units 08/20/24  1432 08/20/24  1430 08/20/24  1417   BLOOD CULTURE  Received in Microbiology Lab. Culture in Progress. Received in Microbiology Lab. Culture in Progress. Received in Microbiology Lab. Culture in Progress.         Results from last 7 days   Lab Units 08/20/24  1417   CRP mg/L 221.0*               Imaging Studies:   I have personally reviewed pertinent imaging study reports and images in PACS.    8/20 MRI C spine: discitis/osteomyelitis of C5-C6 with 3mm epidural phlegmon/small abscess.     Other Studies:   I have personally reviewed pertinent reports.        Carolyn Goode PA-C  [facility]

## 2024-08-21 NOTE — OCCUPATIONAL THERAPY NOTE
"          Occupational Therapy Evaluation        Patient Name: Augustus Coffman  Today's Date: 8/21/2024 08/21/24 1004   OT Last Visit   OT Visit Date 08/21/24   Note Type   Note type Evaluation   Pain Assessment   Pain Assessment Tool 0-10   Pain Score 6   Pain Location/Orientation Location: Neck   Pain Radiating Towards both shoulders more towards the left shoulder   Patient's Stated Pain Goal No pain   Hospital Pain Intervention(s) Medication (See MAR);Repositioned;Ambulation/increased activity;Emotional support   Multiple Pain Sites No   Restrictions/Precautions   Weight Bearing Precautions Per Order Yes   RLE Weight Bearing Per Order   (per Podiatry avoid weight bearing to promote healing. Patient reported he ambulates with surgical shoe, placing \"heel down for support\")   Braces or Orthoses Other (Comment);C/S Collar  (C collar recommended and applied yesterday in the ED, patient is not wearing C collar at this time due to being very uncomfortable. Patient was educated on joint protection and avoiding sudden positional changes with head turns.)   Other Precautions Limb alert;WBS;Fall Risk;Pain   Home Living   Type of Home House   Home Layout Two level;Bed/bath upstairs;Performs ADLs on one level  (split level/ 10 LARY and 10 steps to second floor bedroom)   Bathroom Shower/Tub Walk-in shower   Bathroom Toilet Standard   Bathroom Equipment Other (Comment)  (none reported)   Bathroom Accessibility Accessible   Home Equipment Walker;Crutches   Prior Function   Level of [place] Independent with ADLs;Independent with functional mobility   Lives With Spouse   Receives Help From Family   IADLs Family/Friend/Other provides transportation;Family/Friend/Other provides meals;Independent with medication management   Falls in the last 6 months 0   Lifestyle   Autonomy Patient reported independent  with ADLs/ IADLs. Patient lives with family in a 2 story house, 10 LARY; 10 steps to 2nd floor main area. " Ambulatory without AD   Reciprocal Relationships Supportive Family   General   Family/Caregiver Present No   ADL   Where Assessed Chair   Eating Assistance 7  Independent   Grooming Assistance 6  Modified Independent   UB Bathing Assistance 5  Supervision/Setup   LB Bathing Assistance 5  Supervision/Setup   UB Dressing Assistance 6  Modified independent   LB Dressing Assistance 5  Supervision/Setup   Toileting Assistance  5  Supervision/Setup   Functional Assistance 5  Supervision/Setup   Bed Mobility   Additional Comments received patient OOB to Recliner Chair   Transfers   Sit to Stand 5  Supervision   Additional items Armrests;Verbal cues   Stand to Sit 5  Supervision   Additional items Armrests;Verbal cues   Functional Mobility   Functional Mobility 5  Supervision   Additional Comments educated patient on the use of RW to assist in maintaining WB restrictions to RLE.   Additional items Rolling walker   Balance   Static Sitting Good   Dynamic Sitting Good   Static Standing Fair +   Dynamic Standing Fair +   Activity Tolerance   Activity Tolerance Patient limited by pain   RUE Assessment   RUE Assessment WFL   LUE Assessment   LUE Assessment WFL   Hand Function   Gross Motor Coordination Functional   Fine Motor Coordination Functional   Sensation   Light Touch No apparent deficits  (BUEs)   Vision-Basic Assessment   Current Vision Does not wear glasses   Psychosocial   Psychosocial (WDL) WDL   Cognition   Overall Cognitive Status WFL   Arousal/Participation Alert;Responsive;Cooperative   Attention Within functional limits   Orientation Level Oriented X4   Memory Within functional limits   Following Commands Follows all commands and directions without difficulty   Assessment   Assessment Patient is a 68 y.o. male seen for OT evaluation s/p admit to [facility] on 8/20/2024 w/Cervical discitis. Commorbidities affecting patient's functional performance at time of assessment include: Cervical discitis,  "transaminitis, Acute kidney failure, DM , right  diabetic foot wound, presented to ED with neck pain. Orders placed for OT evaluation and treatment.  Performed at least two patient identifiers during session including name and wristband. Prior to admission, Patient reported independent with ADLs/ IADLs. Patient lives with family in a 2 story house, 10 LARY; 10 steps to 2nd floor main area. Ambulatory without AD . Personal factors affecting patient at time of initial evaluation include: steps to enter. Upon evaluation, patient requires modified independent assist for UB ADLs, supervision assist for LB ADLs, transfers and functional ambulation in room and bathroom with supervision assist, with the use of Rolling Walker. Presents with functional use of BUEs, with intact prehension, coordination and symmetrical muscle strength. Patient was educated on weight bearing status of RLE and joint protection techniques, recommended to use cervical collar when navigating up and down stairs, patient reported \"it is not necessary I have a way to do it\".   Patient appears to be functioning at baseline. No further acute OT needs identified at this time to warrant continuation of services. D/C OT services. From OT standpoint, recommendation at time of d/c would be Home with family support.   Discharge Recommendation   Rehab Resource Intensity Level, OT No post-acute rehabilitation needs   AM-PAC Daily Activity Inpatient   Lower Body Dressing 3   Bathing 3   Toileting 4   Upper Body Dressing 4   Grooming 4   Eating 4   Daily Activity Raw Score 22   Daily Activity Standardized Score (Calc for Raw Score >=11) 47.1   AM-PAC Applied Cognition Inpatient   Following a Speech/Presentation 4   Understanding Ordinary Conversation 4   Taking Medications 4   Remembering Where Things Are Placed or Put Away 4   Remembering List of 4-5 Errands 4   Taking Care of Complicated Tasks 4   Applied Cognition Raw Score 24   Applied Cognition Standardized " Score 62.21   Barthel Index   Feeding 10   Bathing 5   Grooming Score 5   Dressing Score 10   Bladder Score 10   Bowels Score 10   Toilet Use Score 10   Transfers (Bed/Chair) Score 10   Mobility (Level Surface) Score 0   Stairs Score 0   Barthel Index Score 70

## 2024-08-21 NOTE — WOUND OSTOMY CARE
Progress Note - Wound   Augustus Coffman 68 y.o. male MRN: [id number]  Unit/Bed#: -01 Encounter: [id number]    Assessment:   Patient admitted to [facility] due to cervical discitis. History of CHF, diabetes. Wound care consulted for foot wound. Patient is agreeable to assessment, alert and oriented x4, continent of bowel and bladder, is OOB to chair, using walker to ambulate, is an assist with care, EHOB waffle cushion placed at bedside. Follows with podiatry out-patient for foot wound.     1. Patient declined assessment of sacrum and buttock.     2. Bilateral heels- skin is dry, intact, blanchable.     3. Right plantar foot wound- Chronic wound, patient follows with out-patient wound center. Wound is oval in shape, full-thickness, approx. 50% pink tissue and 50% yellow tissue, with scant amount of serosanguineous drainage noted. Delisa-wound is dry, intact, brown and yellow calloused skin, no redness, no warmth.  -Patient wound like to continue with out-patient wound treatment plan: Mepilex up.     Educated patient on importance of frequent offloading of pressure via turning, repositioning, and weight-shifting. Verbalized understanding of plan of care.     No induration, fluctuance, odor, warmth, redness, or purulence noted to the above noted wound. New dressings applied. Patient tolerated well, reports mild pain to the wound. Primary nurse aware of plan of care. See flow sheets for more detailed assessment findings. Will follow along.        Skin care plans:  1-Hydraguard to bilateral sacrum, buttock and heels BID and PRN  2-Elevate heels to offload pressure.  3-Ehob cushion in chair when out of bed.  4-Moisturize skin daily with skin nourishing cream.  5-Right foot- Cleanse wound with NSS, pat dry. Apply piece of Mepilex Up to cover wound bed, then cover with 4x4 and wrap with Julisa. Change daily and as needed for soilage/displacement.       Wound 09/07/23 Diabetic Ulcer Foot Right;Plantar;Posterior (Active)    Wound Image   08/21/24 1053   Wound Description Yellow;Pink 08/21/24 1053   Delisa-wound Assessment Dry;Intact;Callus 08/21/24 1053   Wound Length (cm) 0.7 cm 08/21/24 1053   Wound Width (cm) 1 cm 08/21/24 1053   Wound Depth (cm) 0.1 cm 08/21/24 1053   Wound Surface Area (cm^2) 0.7 cm^2 08/21/24 1053   Wound Volume (cm^3) 0.07 cm^3 08/21/24 1053   Calculated Wound Volume (cm^3) 0.07 cm^3 08/21/24 1053   Change in Wound Size % 93 08/21/24 1053   Drainage Amount Scant 08/21/24 1053   Drainage Description Serosanguineous 08/21/24 1053   Non-staged Wound Description Full thickness 08/21/24 1053   Treatments Cleansed;Irrigation with NSS;Site care 08/21/24 1053   Dressing Foam;Gauze;Dry dressing 08/21/24 1053   Wound packed? No 08/21/24 1053   Dressing Changed Changed 08/21/24 1053   Patient Tolerance Tolerated well 08/21/24 1053   Dressing Status Clean;Dry;Intact 08/21/24 1053     Contact through [employer] Secure Chat with any questions  Wound Care will continue to follow    Lynsey CANDELARIAN RN CWON  Wound and Ostomy care

## 2024-08-21 NOTE — PROGRESS NOTES
[facility]  Progress Note  Name: Augustus Coffman I  MRN: [id number]  Unit/Bed#: -01 I Date of Admission: 8/20/2024   Date of Service: 8/21/2024 I Hospital Day: 1    Assessment & Plan   Acute kidney failure (HCC)  Assessment & Plan  Resolved with IVF  Likely pre renal in setting of home diuretics  Hold HCTZ and losartan  Give IV fluid bolus  Follow up creatinine in AM - 1.3     Transaminitis  Assessment & Plan  Asymptomatic  RUQ u/s WNL  Trend LFT's    Diabetes 1.5, managed as type 2 (HCC)  Assessment & Plan  Lab Results   Component Value Date    HGBA1C 6.8 (H) 07/05/2024       Recent Labs     08/20/24  2118 08/21/24  0743 08/21/24  1113 08/21/24  1610   POCGLU 163* 169* 164* 125       Blood Sugar Average: Last 72 hrs:  (P) 155.25Start sliding scale  Monitor glucose levels       * Cervical discitis  Assessment & Plan  Admitted to [facility] in July 2024 for infected diabetic foot ulcer  Neck pain persistent  Followed up with Ortho and MRI cervical spine was ordered  Discitis/OM at c5/6 with phlegmonous changes  Initially was accepted for transfer to [facility] today  But no surgical intervention was planned  ID recommended long term antibiotics - ID following   Blood cultures MSSA. Repeat Bcx ordered for 8/22. TTE also ordered.   D/w NSGY regarding intolerance with cervical collar and they said ok to manage without c-collar for now               VTE Pharmacologic Prophylaxis: VTE Score: 3 Moderate Risk (Score 3-4) - Pharmacological DVT Prophylaxis Ordered: heparin.    Mobility:   Basic Mobility Inpatient Raw Score: 24  JH-HLM Goal: 8: Walk 250 feet or more  JH-HLM Achieved: 7: Walk 25 feet or more  JH-HLM Goal achieved. Continue to encourage appropriate mobility.    Patient Centered Rounds: I performed bedside rounds with nursing staff today.   Discussions with Specialists or Other Care Team Provider: NSGY, ID     Education and Discussions with Family / Patient: Updated  (wife and  son) at bedside.    Total Time Spent on Date of Encounter in care of patient: 45 mins. This time was spent on one or more of the following: performing physical exam; counseling and coordination of care; obtaining or reviewing history; documenting in the medical record; reviewing/ordering tests, medications or procedures; communicating with other healthcare professionals and discussing with patient's family/caregivers.    Current Length of Stay: 1 day(s)  Current Patient Status: Inpatient   Certification Statement: The patient will continue to require additional inpatient hospital stay due to bacteremia, C5/6 discitis/OM  Discharge Plan: Anticipate discharge in 48-72 hrs to home with home services.    Code Status: Level 1 - Full Code    Subjective:   Neck pain persistent. Could not tolerate c-collar due to severe pain. No neurologic symptoms reported including weakness, sensory deficits.    Objective:     Vitals:   Temp (24hrs), Av.5 °F (36.9 °C), Min:98.2 °F (36.8 °C), Max:98.9 °F (37.2 °C)    Temp:  [98.2 °F (36.8 °C)-98.9 °F (37.2 °C)] 98.4 °F (36.9 °C)  HR:  [] 86  Resp:  [16-17] 17  BP: ()/() 117/73  SpO2:  [92 %-98 %] 98 %  Body mass index is 31.67 kg/m².     Input and Output Summary (last 24 hours):     Intake/Output Summary (Last 24 hours) at 2024 1628  Last data filed at 2024 0511  Gross per 24 hour   Intake 240 ml   Output 500 ml   Net -260 ml       Physical Exam:   Physical Exam   NAD  Nonlabored resp  RRR  NTND abd   aaox3    Additional Data:     Labs:  Results from last 7 days   Lab Units 24  0521 24  1417   WBC Thousand/uL 13.91* 9.61   HEMOGLOBIN g/dL 11.7* 10.5*   HEMATOCRIT % 37.0 33.8*   PLATELETS Thousands/uL 270 245   SEGS PCT %  --  81*   LYMPHO PCT %  --  6*   MONO PCT %  --  8   EOS PCT %  --  3     Results from last 7 days   Lab Units 24  0521   SODIUM mmol/L 136   POTASSIUM mmol/L 4.6   CHLORIDE mmol/L 99   CO2 mmol/L 26   BUN mg/dL 27*    CREATININE mg/dL 1.03   ANION GAP mmol/L 11   CALCIUM mg/dL 9.7   ALBUMIN g/dL 3.7   TOTAL BILIRUBIN mg/dL 0.51   ALK PHOS U/L 157*   ALT U/L 135*   AST U/L 120*   GLUCOSE RANDOM mg/dL 166*         Results from last 7 days   Lab Units 08/21/24  1610 08/21/24  1113 08/21/24  0743 08/20/24  2118   POC GLUCOSE mg/dl 125 164* 169* 163*         Results from last 7 days   Lab Units 08/20/24  1417   LACTIC ACID mmol/L 1.7       Lines/Drains:  Invasive Devices       Peripheral Intravenous Line  Duration             Peripheral IV 08/20/24 Left;Proximal;Ventral (anterior) Forearm 1 day    Peripheral IV 08/20/24 Right;Ventral (anterior) Forearm 1 day                          Imaging: Reviewed radiology reports from this admission including: MRI spine    Recent Cultures (last 7 days):   Results from last 7 days   Lab Units 08/20/24  1432 08/20/24  1430 08/20/24  1417   GRAM STAIN RESULT  Gram positive cocci in clusters* Gram positive cocci in clusters* Gram positive cocci in clusters*       Last 24 Hours Medication List:   Current Facility-Administered Medications   Medication Dose Route Frequency Provider Last Rate    acetaminophen  1,000 mg Intravenous Q6H SRIDHAR Stewart MD 1,000 mg (08/21/24 1140)    acetaminophen  650 mg Oral Q6H PRN Yogesh Stewart MD      atorvastatin  80 mg Oral Daily With Dinner Yogesh Stewart MD      calcium carbonate  1,000 mg Oral Daily PRN Yogesh Stewart MD      cefazolin  2,000 mg Intravenous Q8H Ioana Garvin MD 2,000 mg (08/21/24 1623)    docusate sodium  100 mg Oral BID Yogesh Stewart MD      heparin (porcine)  5,000 Units Subcutaneous Q8H SRIDHAR Stewart MD      HYDROmorphone  1 mg Intravenous Q3H PRN Yogesh Stewart MD      HYDROmorphone  1 mg Intravenous Q3H PRN Yogesh Stewart MD      insulin lispro  1-6 Units Subcutaneous HS Yogesh Stewart MD      insulin lispro  1-6 Units Subcutaneous TID With Meals Yogesh Stewart MD      metoprolol  succinate  50 mg Oral QPM Yogesh Stewart MD      ondansetron  4 mg Intravenous Q6H PRN Yogesh Stewart MD      senna  1 tablet Oral Daily Yogesh Stewart MD      tiZANidine  4 mg Oral Q8H PRN Bishop Farrar MD          Today, Patient Was Seen By: Bishop Farrar MD    **Please Note: This note may have been constructed using a voice recognition system.**

## 2024-08-21 NOTE — TELEMEDICINE
"e-Consult (IPC)     Inpatient consult to Neurosurgery  Consult performed by: Ricardo Gallego PA-C  Consult ordered by: Ioana Garvin MD      Contacted by Dr. Garvin, [facility] ED on 8/20.    Augustus Coffman 68 y.o. male MRN: [id number]  Unit/Bed#: -01 Encounter: [id number]    Reason for Consult    Per provider report, patient presents to the hospital after he had an outpatient MRI completed 8/20 showing evidence for cervical discitis with small epidural abscess.  Patient was reported to [facility] 5 to 6 weeks of cervical neck pain which prompted his outpatient MRI.  He did have a recent hospitalization from 7/28-8/3 where he was noted to have sepsis with MSSA bacteremia from a foot wound. During this admission he was noted to have cervical neck pain, but MRI imaging was not obtained. Available past medical history,social history, surgical history, medication list, drug allergies and review of systems were reviewed.    BP 97/65   Pulse 89   Temp 98.9 °F (37.2 °C)   Resp 17   Ht 6' 4\" (1.93 m)   Wt 118 kg (260 lb 2.3 oz)   SpO2 93%   BMI 31.67 kg/m²      Clinical exam per provider report, Neurologically intact. Cervical neck pain. No deficits .    Imaging personally reviewed. 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  This resulted in moderate canal stenosis with mild mass effect on the cord.  No cord signal change.    Assessment and Recommendations    1.  Continue to monitor symptoms  2.  MRI cervical spine completed without contrast.  Can consider MRI with contrast although this is unlikely to change neurosurgical management at this time  3.  Discussed case with infectious disease, [facility] and ED provider as this is a presumed MSSA bacteremia complication and can be treated with 6 weeks of IV antibiotics per his prior cultures.  4.  No need for transfer at this time as patient has no operative neurosurgical needs  5. Recommend cervical collar to be " worn at all times. Anticipate at least 6 weeks based on clinical improvement.  6.  Continue medical management.  7. Plan for outpatient follow up with neurosurgery in about 4 weeks with repeat XR of the cervical spine. Baseline XR ordered to be completed in the hospital prior to DC.   8. Contact as needed    All questions answered. Provider is in agreement with the course of action.  21-30 minutes, >50% of the total time devoted to medical consultative verbal/EMR discussion between providers. Written report will be generated in the EMR.

## 2024-08-21 NOTE — ASSESSMENT & PLAN NOTE
Admitted to [facility] in July 2024 for infected diabetic foot ulcer  Neck pain persistent  Followed up with Ortho and MRI cervical spine was ordered  Discitis/OM at c5/6 with phlegmonous changes  Initially was accepted for transfer to hospitals today  But no surgical intervention was planned  ID recommended long term antibiotics - ID following   Blood cultures MSSA. Repeat Bcx ordered for 8/22. TTE also ordered.   D/w NSGY regarding intolerance with cervical collar and they said ok to manage without c-collar for now

## 2024-08-21 NOTE — ASSESSMENT & PLAN NOTE
Resolved with IVF  Likely pre renal in setting of home diuretics  Hold HCTZ and losartan  Give IV fluid bolus  Follow up creatinine in AM - 1.3

## 2024-08-21 NOTE — CASE MANAGEMENT
Case Management Progress Note    Patient name Augustus Coffman  Location /-01 MRN [id number]  : 1955 Date 2024       LOS (days): 1  Geometric Mean LOS (GMLOS) (days): 2.9  Days to GMLOS:2.2        OBJECTIVE:        Current admission status: Inpatient  Preferred Pharmacy:   [id number]  [place] - [address]  [address] [address]  Phone: [phone] Fax: [phone]    [id number] [place] - [address]  [address] [address]  Phone: [phone] Fax: [phone]    Primary Care Provider: Gwen Lazo DO    Primary Insurance: MEDICARE  Secondary Insurance: [facility]    PROGRESS NOTE:  Discussed patient's case in interdisciplinary rounds this morning with [facility] provider. Patient is not medically cleared for discharge- diagnosed with a cervical spine infection, and will need long term IV antibiotics. Pending infectious disease consult. Anticipating discharge in 48h to 72 hours. CM will continue to follow for needs.

## 2024-08-21 NOTE — PHYSICAL THERAPY NOTE
Physical Therapy Evaluation     Patient's Name: Augustus Coffman    Admitting Diagnosis  Cervical discitis [M46.42]  Abnormal laboratory test [R89.9]    Problem List  Patient Active Problem List   Diagnosis    Diabetes 1.5, managed as type 2 (HCC)    Hypertension    Hypercholesteremia    Hammer toe of right foot    Stasis dermatitis of both legs    Diabetic peripheral neuropathy (HCC)    Gout    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HCC)    Obesity with body mass index 30 or greater    Type 2 diabetes mellitus (HCC)    Retroperitoneal hematoma    Mesenteric lymphadenopathy    Acute blood loss anemia    FINA (acute kidney injury) on CKD stage 2(HCC)    Heart murmur    GI bleed    Pleural effusion    Chronic venous hypertension (idiopathic) with ulcer of right lower extremity (HCC)    Rash    Stage 2 chronic kidney disease    Hypertensive kidney disease with stage 2 chronic kidney disease    Other proteinuria    Obesity, morbid (HCC)    Follicular lymphoma grade I of lymph nodes of multiple sites (HCC)    Vitamin D deficiency    Stage 3a chronic kidney disease (HCC)    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 (HCC)    Ectatic thoracic aorta (HCC)    Aortic stenosis with trileaflet valve    Sepsis without acute organ dysfunction (HCC)    Acute hypoxic respiratory failure (HCC)    Acute hyponatremia    Chronic diastolic CHF (congestive heart failure) (HCC)    Encounter for deep vein thrombosis (DVT) prophylaxis    Hyponatremia    Neck pain    Cervical discitis    Transaminitis    Acute kidney failure (HCC)       Past Medical History  Past Medical History:   Diagnosis Date    Arthritis 2010's    Occasionally flares up    Cancer (HCC) May 2021    Still investigating    Chronic kidney disease     Diabetes mellitus (HCC)     Follicular lymphoma (HCC)     GERD (gastroesophageal reflux disease) June 2021    Noticed in an Endoscopy    Heart murmur May 2020    High cholesterol     Hypertension     Kidney stone 1980's    Have had since 1980's  "   Obesity Since Childhood       Past Surgical History  Past Surgical History:   Procedure Laterality Date    BUNIONECTOMY Right 11/24/2020    Procedure: RIGHT HAV CORRECTION,;  Surgeon: Niranjan Child DPM;  [facility];  Service: Podiatry    COLONOSCOPY      INCISION AND DRAINAGE OF WOUND Right 10/05/2016    Procedure: INCISION AND DRAINAGE (I&D) EXTREMITY;  Surgeon: Niranjan Child DPM;  [facility];  Service:     IR BIOPSY LYMPH NODE  07/08/2021    IR EMBOLIZATION (SPECIFY VESSEL OR SITE)  07/08/2021    IR PORT PLACEMENT  9/1/2022    PILONIDAL CYST EXCISION      WI CORRECTION HAMMERTOE Right 02/19/2019    Procedure: THIRD HAMMER TOE CORRECTION;  Surgeon: Niranjan Child DPM;  [facility];  Service: Podiatry    WI RMVL MATEO CTR VAD W/SUBQ PORT/ CTR/PRPH INSJ N/A 3/14/2023    Procedure: REMOVAL VENOUS PORT (PORT-A-CATH)IR;  Surgeon: Avelino Vázquez DO;  [facility];  Service: Interventional Radiology    TOE OSTEOTOMY Right 03/14/2017    Procedure: HAMMERTOE CORRECTION R 2 ;  Surgeon: Niranjan Child DPM;  [facility];  Service:     TOE OSTEOTOMY Left 11/24/2020    Procedure: LEFT HT CORRECTION TOE;  Surgeon: Niranjan Child DPM;  [facility];  Service: Podiatry    TONSILLECTOMY  1963        08/21/24 0838   PT Last Visit   PT Visit Date 08/21/24   Note Type   Note type Evaluation   Pain Assessment   Pain Assessment Tool 0-10   Pain Score 6   Restrictions/Precautions   Weight Bearing Precautions Per Order Yes   RLE Weight Bearing Per Order   (per Podiatry avoid weight bearing to promote healing. NWB if can, otherwise Heel touch only as needed. Patient reported he ambulates with surgical shoe, placing \"heel down for support\".)   Braces or Orthoses Other (Comment);C/S Collar  (C collar recommended and applied yesterday in the ED, patient is not wearing C collar at this time due to being very uncomfortable. Patient was educated on joint protection and avoiding sudden " positional changes with head turns.)   Other Precautions Limb alert;Fall Risk;Pain;WBS   Home Living   Type of Home House   Home Layout Two level;Bed/bath upstairs;Performs ADLs on one level  (split level/ 10 LARY and 10 steps to second floor bedroom)   Bathroom Shower/Tub Walk-in shower   Bathroom Toilet Standard   Bathroom Equipment   (none reported)   Bathroom Accessibility Accessible   Home Equipment Walker;Crutches   Additional Comments Pt. used crutches prior to admission, however recently stopped due to shoulder and neck pain. Prefers to utilize RW   Prior Function   Level of [place] Independent with ADLs;Independent with functional mobility   Lives With Spouse   Receives Help From Family   IADLs Family/Friend/Other provides transportation;Family/Friend/Other provides meals;Independent with medication management   Falls in the last 6 months 0   Cognition   Overall Cognitive Status WFL   Attention Within functional limits   Orientation Level Oriented X4   Memory Within functional limits   Following Commands Follows all commands and directions without difficulty   Subjective   Subjective I have neck pain   RUE Assessment   RUE Assessment WFL   LUE Assessment   LUE Assessment WFL   RLE Assessment   RLE Assessment WFL   LLE Assessment   LLE Assessment WFL   Vision-Basic Assessment   Current Vision Does not wear glasses   Bed Mobility   Additional Comments recieved pt. OOB in recliner   Transfers   Sit to Stand 5  Supervision   Additional items Assist x 1;Increased time required;Verbal cues   Stand to Sit 5  Supervision   Additional items Assist x 1;Increased time required;Verbal cues   Additional Comments VC for heel touch WB. Pt unable to attempt NWB, states he knows he cant do it with RW. Reports he cannot use crutches due to shoulder pain.   Ambulation/Elevation   Gait pattern Decreased foot clearance;Wide JERRY;Short stride;Step to;Excessively slow   Gait Assistance 5  Supervision   Additional items Assist  x 1;Verbal cues   Assistive Device Rolling walker   Distance 10'   Balance   Static Sitting Good   Dynamic Sitting Good   Static Standing Fair +   Dynamic Standing Fair +   Endurance Deficit   Endurance Deficit No   Activity Tolerance   Activity Tolerance Patient limited by pain   Nurse Made Aware RN ok to see   Assessment   Prognosis Good   Problem List Decreased skin integrity;Pain   Assessment Pt is 68 y.o. male seen for PT evaluation s/p admit to [facility] 8/20/2024 w/ Cervical discitis. PT consulted to assess pt's functional mobility and d/c needs. Order placed for PT eval and tx, w/  activity  order; reached out to Dr Luu Podiatrist for WB status. Pt. Is NWB R LE vs Heel touch WB as needed limiting it as much as possible. During session pt. Unable to complete NWB with RW. He notes he did better with crutches but he cannot utilize crutches currently due to neck and shoulder pain. Recommend use of RW during hospitalization and upon D/C to assist in offloading RLE during ambulation with Heel touch WB. Education provided on offloading forefoot. Pt. Receptive and reported compliance. Comorbidities affecting pt's physical performance at time of assessment include: Diabetes, cervical discitis, FINA, Obesity, CHF, neck pain. PTA, pt was independent w/ all functional mobility w/ crutches previously now no AD; during IE recommend RW use . Personal factors affecting pt at time of IE include: lives in multi story house and ambulating w/ assistive device. Please find objective findings from PT assessment regarding body systems outlined above with impairments and limitations including gait deviations, pain, and decreased skin integrity. The following objective measures performed on IE also reveal limitations: AM-PAC 6-Clicks: 22/24. Pt's clinical presentation is currently stable  seen in pt's presentation. Pt to benefit from continued PT tx to address deficits as defined above and maximize level of functional  independent mobility and consistency. From PT/mobility standpoint, recommendation at time of d/c would be no services for PT pending progress in order to facilitate return to PLOF. Recommend D/C acute care PT.   Barriers to Discharge None   Plan   PT Frequency   (d/c acute care PT)   Discharge Recommendation   Rehab Resource Intensity Level, PT No post-acute rehabilitation needs   AM-PAC Basic Mobility Inpatient   Turning in Flat Bed Without Bedrails 4   Lying on Back to Sitting on Edge of Flat Bed Without Bedrails 4   Moving Bed to Chair 4   Standing Up From Chair Using Arms 4   Walk in Room 3   Climb 3-5 Stairs With Railing 3   Basic Mobility Inpatient Raw Score 22   Basic Mobility Standardized Score 47.4   [facility] Highest Level Of Mobility   -HLM Goal 7: Walk 25 feet or more   -HLM Achieved 6: Walk 10 steps or more   Barthel Index   Grooming Score 5   Dressing Score 10   Toilet Use Score 10   Transfers (Bed/Chair) Score 10   Mobility (Level Surface) Score 0           Denice Brock, PT

## 2024-08-21 NOTE — ASSESSMENT & PLAN NOTE
Lab Results   Component Value Date    HGBA1C 6.8 (H) 07/05/2024       Recent Labs     08/20/24  2118 08/21/24  0743 08/21/24  1113 08/21/24  1610   POCGLU 163* 169* 164* 125       Blood Sugar Average: Last 72 hrs:  (P) 155.25Start sliding scale  Monitor glucose levels

## 2024-08-22 ENCOUNTER — APPOINTMENT (INPATIENT)
Dept: RADIOLOGY | Facility: HOSPITAL | Age: 69
DRG: 551 | End: 2024-08-22
Payer: MEDICARE

## 2024-08-22 ENCOUNTER — APPOINTMENT (INPATIENT)
Dept: NON INVASIVE DIAGNOSTICS | Facility: HOSPITAL | Age: 69
DRG: 551 | End: 2024-08-22
Payer: MEDICARE

## 2024-08-22 PROBLEM — B95.61 MSSA BACTEREMIA: Status: ACTIVE | Noted: 2024-08-22

## 2024-08-22 PROBLEM — R78.81 MSSA BACTEREMIA: Status: ACTIVE | Noted: 2024-08-22

## 2024-08-22 PROBLEM — I48.91 NEW ONSET A-FIB (HCC): Status: ACTIVE | Noted: 2024-08-22

## 2024-08-22 LAB
ALBUMIN SERPL BCG-MCNC: 3.8 G/DL (ref 3.5–5)
ALP SERPL-CCNC: 180 U/L (ref 34–104)
ALT SERPL W P-5'-P-CCNC: 89 U/L (ref 7–52)
ANION GAP SERPL CALCULATED.3IONS-SCNC: 11 MMOL/L (ref 4–13)
APTT PPP: 40 SECONDS (ref 23–34)
APTT PPP: 46 SECONDS (ref 23–34)
APTT PPP: 46 SECONDS (ref 23–34)
AST SERPL W P-5'-P-CCNC: 75 U/L (ref 13–39)
ATRIAL RATE: 150 BPM
ATRIAL RATE: 159 BPM
ATRIAL RATE: 166 BPM
BASOPHILS # BLD AUTO: 0.06 THOUSANDS/ÂΜL (ref 0–0.1)
BASOPHILS NFR BLD AUTO: 0 % (ref 0–1)
BILIRUB SERPL-MCNC: 0.64 MG/DL (ref 0.2–1)
BSA FOR ECHO PROCEDURE: 2.48 M2
BUN SERPL-MCNC: 23 MG/DL (ref 5–25)
CALCIUM SERPL-MCNC: 10 MG/DL (ref 8.4–10.2)
CARDIAC TROPONIN I PNL SERPL HS: 15 NG/L (ref 8–18)
CARDIAC TROPONIN I PNL SERPL HS: 16 NG/L (ref 8–18)
CARDIAC TROPONIN I PNL SERPL HS: 18 NG/L (ref 8–18)
CHLORIDE SERPL-SCNC: 97 MMOL/L (ref 96–108)
CO2 SERPL-SCNC: 26 MMOL/L (ref 21–32)
CREAT SERPL-MCNC: 1.08 MG/DL (ref 0.6–1.3)
EOSINOPHIL # BLD AUTO: 0.14 THOUSAND/ÂΜL (ref 0–0.61)
EOSINOPHIL NFR BLD AUTO: 1 % (ref 0–6)
ERYTHROCYTE [DISTWIDTH] IN BLOOD BY AUTOMATED COUNT: 16.6 % (ref 11.6–15.1)
GFR SERPL CREATININE-BSD FRML MDRD: 70 ML/MIN/1.73SQ M
GLUCOSE SERPL-MCNC: 188 MG/DL (ref 65–140)
GLUCOSE SERPL-MCNC: 210 MG/DL (ref 65–140)
GLUCOSE SERPL-MCNC: 234 MG/DL (ref 65–140)
GLUCOSE SERPL-MCNC: 254 MG/DL (ref 65–140)
GLUCOSE SERPL-MCNC: 344 MG/DL (ref 65–140)
HCT VFR BLD AUTO: 36.4 % (ref 36.5–49.3)
HGB BLD-MCNC: 11.2 G/DL (ref 12–17)
IMM GRANULOCYTES # BLD AUTO: 0.09 THOUSAND/UL (ref 0–0.2)
IMM GRANULOCYTES NFR BLD AUTO: 1 % (ref 0–2)
INR PPP: 1.21 (ref 0.85–1.19)
LYMPHOCYTES # BLD AUTO: 0.79 THOUSANDS/ÂΜL (ref 0.6–4.47)
LYMPHOCYTES NFR BLD AUTO: 5 % (ref 14–44)
MAGNESIUM SERPL-MCNC: 2.2 MG/DL (ref 1.9–2.7)
MCH RBC QN AUTO: 25.3 PG (ref 26.8–34.3)
MCHC RBC AUTO-ENTMCNC: 30.8 G/DL (ref 31.4–37.4)
MCV RBC AUTO: 82 FL (ref 82–98)
MONOCYTES # BLD AUTO: 1.26 THOUSAND/ÂΜL (ref 0.17–1.22)
MONOCYTES NFR BLD AUTO: 9 % (ref 4–12)
NEUTROPHILS # BLD AUTO: 12.43 THOUSANDS/ÂΜL (ref 1.85–7.62)
NEUTS SEG NFR BLD AUTO: 84 % (ref 43–75)
NRBC BLD AUTO-RTO: 0 /100 WBCS
PLATELET # BLD AUTO: 303 THOUSANDS/UL (ref 149–390)
PMV BLD AUTO: 10.1 FL (ref 8.9–12.7)
POTASSIUM SERPL-SCNC: 5 MMOL/L (ref 3.5–5.3)
PROT SERPL-MCNC: 7.8 G/DL (ref 6.4–8.4)
PROTHROMBIN TIME: 16 SECONDS (ref 12.3–15)
QRS AXIS: -2 DEGREES
QRSD INTERVAL: 68 MS
QRSD INTERVAL: 76 MS
QRSD INTERVAL: 78 MS
QT INTERVAL: 258 MS
QT INTERVAL: 298 MS
QT INTERVAL: 312 MS
QTC INTERVAL: 396 MS
QTC INTERVAL: 466 MS
QTC INTERVAL: 472 MS
RBC # BLD AUTO: 4.42 MILLION/UL (ref 3.88–5.62)
SODIUM SERPL-SCNC: 134 MMOL/L (ref 135–147)
T WAVE AXIS: 12 DEGREES
T WAVE AXIS: 16 DEGREES
T WAVE AXIS: 18 DEGREES
VENTRICULAR RATE: 138 BPM
VENTRICULAR RATE: 142 BPM
VENTRICULAR RATE: 147 BPM
WBC # BLD AUTO: 14.77 THOUSAND/UL (ref 4.31–10.16)

## 2024-08-22 PROCEDURE — 93325 DOPPLER ECHO COLOR FLOW MAPG: CPT | Performed by: STUDENT IN AN ORGANIZED HEALTH CARE EDUCATION/TRAINING PROGRAM

## 2024-08-22 PROCEDURE — 99233 SBSQ HOSP IP/OBS HIGH 50: CPT | Performed by: STUDENT IN AN ORGANIZED HEALTH CARE EDUCATION/TRAINING PROGRAM

## 2024-08-22 PROCEDURE — 84484 ASSAY OF TROPONIN QUANT: CPT | Performed by: STUDENT IN AN ORGANIZED HEALTH CARE EDUCATION/TRAINING PROGRAM

## 2024-08-22 PROCEDURE — 85730 THROMBOPLASTIN TIME PARTIAL: CPT | Performed by: STUDENT IN AN ORGANIZED HEALTH CARE EDUCATION/TRAINING PROGRAM

## 2024-08-22 PROCEDURE — 93005 ELECTROCARDIOGRAM TRACING: CPT

## 2024-08-22 PROCEDURE — 99233 SBSQ HOSP IP/OBS HIGH 50: CPT | Performed by: INTERNAL MEDICINE

## 2024-08-22 PROCEDURE — 87040 BLOOD CULTURE FOR BACTERIA: CPT | Performed by: PHYSICIAN ASSISTANT

## 2024-08-22 PROCEDURE — 84484 ASSAY OF TROPONIN QUANT: CPT | Performed by: PHYSICIAN ASSISTANT

## 2024-08-22 PROCEDURE — 99223 1ST HOSP IP/OBS HIGH 75: CPT | Performed by: INTERNAL MEDICINE

## 2024-08-22 PROCEDURE — 85025 COMPLETE CBC W/AUTO DIFF WBC: CPT | Performed by: STUDENT IN AN ORGANIZED HEALTH CARE EDUCATION/TRAINING PROGRAM

## 2024-08-22 PROCEDURE — 71045 X-RAY EXAM CHEST 1 VIEW: CPT

## 2024-08-22 PROCEDURE — 82948 REAGENT STRIP/BLOOD GLUCOSE: CPT

## 2024-08-22 PROCEDURE — 80053 COMPREHEN METABOLIC PANEL: CPT | Performed by: STUDENT IN AN ORGANIZED HEALTH CARE EDUCATION/TRAINING PROGRAM

## 2024-08-22 PROCEDURE — 93321 DOPPLER ECHO F-UP/LMTD STD: CPT | Performed by: STUDENT IN AN ORGANIZED HEALTH CARE EDUCATION/TRAINING PROGRAM

## 2024-08-22 PROCEDURE — 85730 THROMBOPLASTIN TIME PARTIAL: CPT

## 2024-08-22 PROCEDURE — 85610 PROTHROMBIN TIME: CPT

## 2024-08-22 PROCEDURE — 93010 ELECTROCARDIOGRAM REPORT: CPT | Performed by: INTERNAL MEDICINE

## 2024-08-22 PROCEDURE — 93325 DOPPLER ECHO COLOR FLOW MAPG: CPT

## 2024-08-22 PROCEDURE — 83735 ASSAY OF MAGNESIUM: CPT | Performed by: STUDENT IN AN ORGANIZED HEALTH CARE EDUCATION/TRAINING PROGRAM

## 2024-08-22 PROCEDURE — 93321 DOPPLER ECHO F-UP/LMTD STD: CPT

## 2024-08-22 PROCEDURE — 93308 TTE F-UP OR LMTD: CPT

## 2024-08-22 PROCEDURE — 93308 TTE F-UP OR LMTD: CPT | Performed by: STUDENT IN AN ORGANIZED HEALTH CARE EDUCATION/TRAINING PROGRAM

## 2024-08-22 RX ORDER — HEPARIN SODIUM 1000 [USP'U]/ML
2000 INJECTION, SOLUTION INTRAVENOUS; SUBCUTANEOUS EVERY 6 HOURS PRN
Status: DISCONTINUED | OUTPATIENT
Start: 2024-08-22 | End: 2024-08-23 | Stop reason: HOSPADM

## 2024-08-22 RX ORDER — METOPROLOL TARTRATE 1 MG/ML
2.5 INJECTION, SOLUTION INTRAVENOUS ONCE
Status: COMPLETED | OUTPATIENT
Start: 2024-08-22 | End: 2024-08-22

## 2024-08-22 RX ORDER — DIGOXIN 0.25 MG/ML
250 INJECTION INTRAMUSCULAR; INTRAVENOUS EVERY 6 HOURS
Status: DISCONTINUED | OUTPATIENT
Start: 2024-08-22 | End: 2024-08-22

## 2024-08-22 RX ORDER — MORPHINE SULFATE 4 MG/ML
4 INJECTION, SOLUTION INTRAMUSCULAR; INTRAVENOUS EVERY 4 HOURS PRN
Status: DISCONTINUED | OUTPATIENT
Start: 2024-08-22 | End: 2024-08-23

## 2024-08-22 RX ORDER — COLCHICINE 0.6 MG/1
0.6 TABLET ORAL 2 TIMES DAILY
Status: DISCONTINUED | OUTPATIENT
Start: 2024-08-22 | End: 2024-08-23 | Stop reason: HOSPADM

## 2024-08-22 RX ORDER — ASPIRIN 325 MG
325 TABLET ORAL ONCE
Status: COMPLETED | OUTPATIENT
Start: 2024-08-22 | End: 2024-08-22

## 2024-08-22 RX ORDER — OXYCODONE HYDROCHLORIDE 10 MG/1
10 TABLET ORAL EVERY 4 HOURS PRN
Status: DISCONTINUED | OUTPATIENT
Start: 2024-08-22 | End: 2024-08-23 | Stop reason: HOSPADM

## 2024-08-22 RX ORDER — OXYCODONE HYDROCHLORIDE 5 MG/1
5 TABLET ORAL EVERY 4 HOURS PRN
Status: DISCONTINUED | OUTPATIENT
Start: 2024-08-22 | End: 2024-08-23 | Stop reason: HOSPADM

## 2024-08-22 RX ORDER — HEPARIN SODIUM 1000 [USP'U]/ML
4000 INJECTION, SOLUTION INTRAVENOUS; SUBCUTANEOUS ONCE
Status: COMPLETED | OUTPATIENT
Start: 2024-08-22 | End: 2024-08-22

## 2024-08-22 RX ORDER — HEPARIN SODIUM 1000 [USP'U]/ML
4000 INJECTION, SOLUTION INTRAVENOUS; SUBCUTANEOUS EVERY 6 HOURS PRN
Status: DISCONTINUED | OUTPATIENT
Start: 2024-08-22 | End: 2024-08-23 | Stop reason: HOSPADM

## 2024-08-22 RX ORDER — METOPROLOL TARTRATE 25 MG/1
25 TABLET, FILM COATED ORAL EVERY 6 HOURS
Status: DISCONTINUED | OUTPATIENT
Start: 2024-08-22 | End: 2024-08-23 | Stop reason: HOSPADM

## 2024-08-22 RX ORDER — HEPARIN SODIUM 10000 [USP'U]/100ML
3-20 INJECTION, SOLUTION INTRAVENOUS
Status: DISCONTINUED | OUTPATIENT
Start: 2024-08-22 | End: 2024-08-23 | Stop reason: HOSPADM

## 2024-08-22 RX ADMIN — INSULIN LISPRO 5 UNITS: 100 INJECTION, SOLUTION INTRAVENOUS; SUBCUTANEOUS at 12:07

## 2024-08-22 RX ADMIN — ACETAMINOPHEN 1000 MG: 10 INJECTION INTRAVENOUS at 23:52

## 2024-08-22 RX ADMIN — INSULIN LISPRO 3 UNITS: 100 INJECTION, SOLUTION INTRAVENOUS; SUBCUTANEOUS at 16:29

## 2024-08-22 RX ADMIN — METOPROLOL TARTRATE 25 MG: 25 TABLET, FILM COATED ORAL at 10:24

## 2024-08-22 RX ADMIN — SODIUM CHLORIDE, SODIUM LACTATE, POTASSIUM CHLORIDE, AND CALCIUM CHLORIDE 1000 ML: .6; .31; .03; .02 INJECTION, SOLUTION INTRAVENOUS at 09:47

## 2024-08-22 RX ADMIN — CEFAZOLIN SODIUM 2000 MG: 2 SOLUTION INTRAVENOUS at 09:53

## 2024-08-22 RX ADMIN — HEPARIN SODIUM 5000 UNITS: 5000 INJECTION INTRAVENOUS; SUBCUTANEOUS at 06:19

## 2024-08-22 RX ADMIN — SODIUM CHLORIDE, SODIUM LACTATE, POTASSIUM CHLORIDE, AND CALCIUM CHLORIDE 500 ML: .6; .31; .03; .02 INJECTION, SOLUTION INTRAVENOUS at 08:48

## 2024-08-22 RX ADMIN — METOROPROLOL TARTRATE 2.5 MG: 5 INJECTION, SOLUTION INTRAVENOUS at 08:56

## 2024-08-22 RX ADMIN — ACETAMINOPHEN 1000 MG: 10 INJECTION INTRAVENOUS at 06:19

## 2024-08-22 RX ADMIN — INSULIN LISPRO 1 UNITS: 100 INJECTION, SOLUTION INTRAVENOUS; SUBCUTANEOUS at 22:34

## 2024-08-22 RX ADMIN — METOROPROLOL TARTRATE 2.5 MG: 5 INJECTION, SOLUTION INTRAVENOUS at 09:00

## 2024-08-22 RX ADMIN — MORPHINE SULFATE 4 MG: 4 INJECTION INTRAVENOUS at 19:59

## 2024-08-22 RX ADMIN — ASPIRIN 325 MG ORAL TABLET 325 MG: 325 PILL ORAL at 08:47

## 2024-08-22 RX ADMIN — MORPHINE SULFATE 2 MG: 2 INJECTION, SOLUTION INTRAMUSCULAR; INTRAVENOUS at 12:44

## 2024-08-22 RX ADMIN — OXYCODONE HYDROCHLORIDE 10 MG: 10 TABLET ORAL at 08:43

## 2024-08-22 RX ADMIN — HEPARIN SODIUM 2000 UNITS: 1000 INJECTION INTRAVENOUS; SUBCUTANEOUS at 23:55

## 2024-08-22 RX ADMIN — HEPARIN SODIUM 11.1 UNITS/KG/HR: 10000 INJECTION, SOLUTION INTRAVENOUS at 10:11

## 2024-08-22 RX ADMIN — ACETAMINOPHEN 1000 MG: 10 INJECTION INTRAVENOUS at 17:37

## 2024-08-22 RX ADMIN — HEPARIN SODIUM 4000 UNITS: 1000 INJECTION INTRAVENOUS; SUBCUTANEOUS at 09:55

## 2024-08-22 RX ADMIN — COLCHICINE 0.6 MG: 0.6 TABLET ORAL at 17:39

## 2024-08-22 RX ADMIN — CEFAZOLIN SODIUM 2000 MG: 2 SOLUTION INTRAVENOUS at 16:32

## 2024-08-22 RX ADMIN — OXYCODONE HYDROCHLORIDE 10 MG: 10 TABLET ORAL at 22:33

## 2024-08-22 RX ADMIN — OXYCODONE HYDROCHLORIDE 10 MG: 10 TABLET ORAL at 12:22

## 2024-08-22 RX ADMIN — MORPHINE SULFATE 4 MG: 4 INJECTION INTRAVENOUS at 10:08

## 2024-08-22 RX ADMIN — COLCHICINE 0.6 MG: 0.6 TABLET ORAL at 10:18

## 2024-08-22 RX ADMIN — ATORVASTATIN CALCIUM 80 MG: 40 TABLET, FILM COATED ORAL at 16:28

## 2024-08-22 RX ADMIN — HYDROMORPHONE HYDROCHLORIDE 1 MG: 1 INJECTION, SOLUTION INTRAMUSCULAR; INTRAVENOUS; SUBCUTANEOUS at 02:31

## 2024-08-22 RX ADMIN — ACETAMINOPHEN 1000 MG: 10 INJECTION INTRAVENOUS at 12:26

## 2024-08-22 RX ADMIN — CEFAZOLIN SODIUM 2000 MG: 2 SOLUTION INTRAVENOUS at 01:25

## 2024-08-22 RX ADMIN — OXYCODONE HYDROCHLORIDE 10 MG: 10 TABLET ORAL at 16:28

## 2024-08-22 RX ADMIN — DOCUSATE SODIUM 100 MG: 100 CAPSULE, LIQUID FILLED ORAL at 17:39

## 2024-08-22 RX ADMIN — METOPROLOL TARTRATE 25 MG: 25 TABLET, FILM COATED ORAL at 16:28

## 2024-08-22 RX ADMIN — INSULIN LISPRO 3 UNITS: 100 INJECTION, SOLUTION INTRAVENOUS; SUBCUTANEOUS at 07:58

## 2024-08-22 RX ADMIN — HEPARIN SODIUM 2000 UNITS: 1000 INJECTION INTRAVENOUS; SUBCUTANEOUS at 16:40

## 2024-08-22 NOTE — ASSESSMENT & PLAN NOTE
Admitted to [facility] in July 2024 for infected diabetic foot ulcer  Neck pain was persistent  Followed up with Ortho and MRI cervical spine was ordered  Discitis/OM at c5/6 with phlegmonous changes  Initially was accepted for transfer to [facility] today  But no surgical intervention was planned  ID recommended long term antibiotics - ID following   Blood cultures MSSA. Repeat Bcx ordered for 8/22.   TTE ordered - follow up   D/w NSGY regarding intolerance with cervical collar and they said ok to manage without c-collar for now

## 2024-08-22 NOTE — ASSESSMENT & PLAN NOTE
Lab Results   Component Value Date    HGBA1C 6.8 (H) 07/05/2024       Recent Labs     08/21/24  1610 08/21/24 2055 08/22/24  0743 08/22/24  1134   POCGLU 125 135 234* 344*         Blood Sugar Average: Last 72 hrs:  (P) [id number] sliding scale  Monitor glucose levels   Sliding scale

## 2024-08-22 NOTE — CONSULTS
Consultation - Cardiology   Augustus Coffman 68 y.o. male MRN: [id number]  Unit/Bed#: -01 Encounter: [id number]  08/22/24  9:03 AM    Assessment/ Plan:  Suspected pericarditis  Endorses chest pain exacerbated with deep inspiration.  EKG shows diffuse ST elevations with UT depressions.  Troponin is negative x2, continue to trend.  TTE pending.  Start colchicine 0.6 mg bid.  Continue telemetry monitoring.    2.  New onset atrial fibrillation with RVR  Telemetry shows atrial fibrillation with RVR.  Transition to Lopressor 25 mg q6h.  [id number] at least 3, start heparin gtt.  Plan for transition to oral AC prior to discharge.    3.  Moderate aortic stenosis  Continue periodic surveillance.    4.  Hypertension  BP is borderline soft, continue to monitor.  Transition to Lopressor 25 mg q6h.    5.  Ascending TAA (4.5 cm)  Continue periodic surveillance.    6.  MSSA bacteremia - infectious disease following  7.  Cervical discitis/osteomyelitis   8.  Transaminitis  9.  T2DM, A1c 6.8 with R diabetic foot wound  10.  History of follicular lymphoma        History of Present Illness   Physician Requesting Consult: Bishop Farrar MD    Reason for Consult / Principal Problem: Chest pain    HPI: Augustus Coffman is a 68 y.o. year old male with history of moderate aortic stenosis, hypertension, T2DM, and follicular lymphoma who presents with neck pain.  Patient was found to have cervical discitis/osteomyelitis in setting of bacteremia.  He subsequently developed new onset A-fib with RVR and chest pain.  EKG revealed diffuse ST elevations and UT depressions.  Cardiology consulted for further evaluation and management.  Chest pain is exacerbated with deep inspiration.  Patient denies history of tobacco use.  Denies known family history of cardiac disease.  Denies any additional complaints at this time.        Inpatient consult to Cardiology  Consult performed by: Augustus Garcia PA-C  Consult ordered by: Bishop Farrar  MD            Review of Systems   Constitutional:  Positive for fatigue. Negative for chills and fever.   HENT:  Negative for ear pain and sore throat.    Eyes:  Negative for pain and visual disturbance.   Respiratory:  Negative for cough and shortness of breath.    Cardiovascular:  Positive for chest pain. Negative for palpitations and leg swelling.   Gastrointestinal:  Negative for abdominal pain and vomiting.   Genitourinary:  Negative for dysuria and hematuria.   Musculoskeletal:  Positive for neck pain and neck stiffness. Negative for arthralgias and back pain.   Skin:  Negative for color change and rash.   Neurological:  Negative for seizures and syncope.   All other systems reviewed and are negative.      Historical Information   Past Medical History:   Diagnosis Date    Arthritis 2010's    Occasionally flares up    Cancer (HCC) May 2021    Still investigating    Chronic kidney disease     Diabetes mellitus (HCC)     Follicular lymphoma (HCC)     GERD (gastroesophageal reflux disease) June 2021    Noticed in an Endoscopy    Heart murmur May 2020    High cholesterol     Hypertension     Kidney stone 1980's    Have had since 1980's    Obesity Since Childhood     Past Surgical History:   Procedure Laterality Date    BUNIONECTOMY Right 11/24/2020    Procedure: RIGHT HAV CORRECTION,;  Surgeon: Niranjan Child DPM;  [facility];  Service: Podiatry    COLONOSCOPY      INCISION AND DRAINAGE OF WOUND Right 10/05/2016    Procedure: INCISION AND DRAINAGE (I&D) EXTREMITY;  Surgeon: Niranjan Child DPM;  Location[facility];  Service:     IR BIOPSY LYMPH NODE  07/08/2021    IR EMBOLIZATION (SPECIFY VESSEL OR SITE)  07/08/2021    IR PORT PLACEMENT  9/1/2022    PILONIDAL CYST EXCISION      MN CORRECTION HAMMERTOE Right 02/19/2019    Procedure: THIRD HAMMER TOE CORRECTION;  Surgeon: Niranjan Child DPM;  [facility];  Service: Podiatry    MN RMVL MATEO CTR VAD W/SUBQ PORT/ CTR/PRPH INSJ N/A 3/14/2023    Procedure:  "REMOVAL VENOUS PORT (PORT-A-CATH)IR;  Surgeon: Avelino Vázquez DO;  [facility];  Service: Interventional Radiology    TOE OSTEOTOMY Right 2017    Procedure: HAMMERTOE CORRECTION R 2 ;  Surgeon: Niranjan Child DPM;  [facility];  Service:     TOE OSTEOTOMY Left 2020    Procedure: LEFT HT CORRECTION TOE;  Surgeon: Niranjan Child DPM;  [facility];  Service: Podiatry    TONSILLECTOMY  1963     Social History     Substance and Sexual Activity   Alcohol Use Never     Social History     Substance and Sexual Activity   Drug Use Never     Social History     Tobacco Use   Smoking Status Never   Smokeless Tobacco Never   Tobacco Comments    Never smoked but exposed to second hand smoke from birth until        Family History:   Family History   Problem Relation Age of Onset    Cancer Father         Leukemia       Meds/Allergies   all current active meds have been reviewed  Allergies   Allergen Reactions    Lisinopril Cough       Objective   Vitals: Blood pressure 103/70, pulse (!) 138, temperature 99.3 °F (37.4 °C), resp. rate 18, height 6' 4\" (1.93 m), weight 118 kg (260 lb 2.3 oz), SpO2 96%., Body mass index is 31.67 kg/m².,   Orthostatic Blood Pressures      Flowsheet Row Most Recent Value   Blood Pressure 103/70 filed at 2024 0843   Patient Position - Orthostatic VS Lying filed at 2024 2057            Systolic (24hrs), Av , Min:103 , Max:117     Diastolic (24hrs), Av, Min:70, Max:73        Intake/Output Summary (Last 24 hours) at 2024 0903  Last data filed at 2024 0550  Gross per 24 hour   Intake 150 ml   Output --   Net 150 ml       Invasive Devices       Peripheral Intravenous Line  Duration             Peripheral IV 24 Left;Proximal;Ventral (anterior) Forearm 1 day    Peripheral IV 24 Right;Ventral (anterior) Forearm 1 day                        Physical Exam:  GEN: Alert and oriented x3, Ill appearing, obese, and well nourished. "   HEENT: Sclera anicteric, conjunctivae pink, mucous membranes moist. Oropharynx clear.   NECK: Supple, no carotid bruits, no significant JVD. Trachea midline, no thyromegaly.   HEART: Tachycardic, irregularly irregular rhythm, normal S1 and S2, 2/6 MANNY, no clicks, gallops or rubs. PMI nondisplaced, no thrills.   LUNGS: Clear to auscultation bilaterally; no wheezes, rales, or rhonchi. No increased work of breathing or signs of respiratory distress.   ABDOMEN: Soft, nontender, nondistended, normoactive bowel sounds.   EXTREMITIES: Skin warm and well perfused, no clubbing, cyanosis, or edema.  NEURO: No focal findings. Normal speech. Mood and affect normal.   SKIN: R foot wound, otherwise normal without suspicious lesions on exposed skin.    Lab Results:     Cardiac Profile:       CBC with diff:   Results from last 7 days   Lab Units 08/22/24  0550 08/21/24  0521 08/20/24  1417   WBC Thousand/uL 14.77* 13.91* 9.61   HEMOGLOBIN g/dL 11.2* 11.7* 10.5*   HEMATOCRIT % 36.4* 37.0 33.8*   MCV fL 82 81* 82   PLATELETS Thousands/uL 303 270 245   RBC Million/uL 4.42 4.59 4.12   MCH pg 25.3* 25.5* 25.5*   MCHC g/dL 30.8* 31.6 31.1*   RDW % 16.6* 16.5* 16.4*   MPV fL 10.1 9.9 9.8   NRBC AUTO /100 WBCs 0  --  0         CMP:   Results from last 7 days   Lab Units 08/22/24  0550 08/21/24  0521 08/20/24  1417   POTASSIUM mmol/L 5.0 4.6 5.0   CHLORIDE mmol/L 97 99 97   CO2 mmol/L 26 26 30   BUN mg/dL 23 27* 39*   CREATININE mg/dL 1.08 1.03 1.36*   CALCIUM mg/dL 10.0 9.7 10.3*   AST U/L 75* 120* 107*   ALT U/L 89* 135* 118*   ALK PHOS U/L 180* 157* 116*   EGFR ml/min/1.73sq m 70 74 53

## 2024-08-22 NOTE — ASSESSMENT & PLAN NOTE
New onset A-fib RVR with rates up to the 150s this morning  Also had chest pain with this  There was some concern for ST elevations (diffuse) on EKG - d/w cardiology who think likely pericarditis  Trop negative x 2   He was loaded with ASA    hep gtt  Converted to NSR and rates became controlled   Continue metop 25 TID   Follow up echo

## 2024-08-22 NOTE — QUICK NOTE
Discussed with Cardiology and ID regarding TTE findings of moderate pericardial effusion. Recommendation is for transfer to [facility] for thoracic surgery evaluation for drainage with the concern for purulent pericarditis. Did send a message to Thoracic surgery regarding this and discussed with [facility] who accepted the patient for transfer. Awaiting bed opening and transport.

## 2024-08-22 NOTE — PLAN OF CARE
Problem: Potential for Falls  Goal: Patient will remain free of falls  Description: INTERVENTIONS:  - Educate patient/family on patient safety including physical limitations  - Instruct patient to call for assistance with activity   - Consult OT/PT to assist with strengthening/mobility   - Keep Call bell within reach  - Keep bed low and locked with side rails adjusted as appropriate  - Keep care items and personal belongings within reach  - Initiate and maintain comfort rounds  - Make Fall Risk Sign visible to staff  - Apply yellow socks and bracelet for high fall risk patients  - Consider moving patient to room near nurses station  Outcome: Progressing     Problem: SAFETY ADULT  Goal: Patient will remain free of falls  Description: INTERVENTIONS:  - Educate patient/family on patient safety including physical limitations  - Instruct patient to call for assistance with activity   - Consult OT/PT to assist with strengthening/mobility   - Keep Call bell within reach  - Keep bed low and locked with side rails adjusted as appropriate  - Keep care items and personal belongings within reach  - Initiate and maintain comfort rounds  - Make Fall Risk Sign visible to staff  - Obtain necessary fall risk management equipment: Walker   - Apply yellow socks and bracelet for high fall risk patients  - Consider moving patient to room near nurses station  Outcome: Progressing     Problem: Knowledge Deficit  Goal: Patient/family/caregiver demonstrates understanding of disease process, treatment plan, medications, and discharge instructions  Description: Complete learning assessment and assess knowledge base.  Interventions:  - Provide teaching at level of understanding  - Provide teaching via preferred learning methods  Outcome: Progressing

## 2024-08-22 NOTE — PLAN OF CARE
Problem: Potential for Falls  Goal: Patient will remain free of falls  Description: INTERVENTIONS:  - Educate patient/family on patient safety including physical limitations  - Instruct patient to call for assistance with activity   - Consult OT/PT to assist with strengthening/mobility   - Keep Call bell within reach  - Keep bed low and locked with side rails adjusted as appropriate  - Keep care items and personal belongings within reach  - Initiate and maintain comfort rounds  - Make Fall Risk Sign visible to staff  - Offer Toileting every 2 Hours, in advance of need  - Initiate/Maintain bed alarm  - Obtain necessary fall risk management equipment: walker  - Apply yellow socks and bracelet for high fall risk patients  - Consider moving patient to room near nurses station  Outcome: Progressing     Problem: PAIN - ADULT  Goal: Verbalizes/displays adequate comfort level or baseline comfort level  Description: Interventions:  - Encourage patient to monitor pain and request assistance  - Assess pain using appropriate pain scale  - Administer analgesics based on type and severity of pain and evaluate response  - Implement non-pharmacological measures as appropriate and evaluate response  - Consider cultural and social influences on pain and pain management  - Notify physician/advanced practitioner if interventions unsuccessful or patient reports new pain  Outcome: Progressing     Problem: INFECTION - ADULT  Goal: Absence or prevention of progression during hospitalization  Description: INTERVENTIONS:  - Assess and monitor for signs and symptoms of infection  - Monitor lab/diagnostic results  - Monitor all insertion sites, i.e. indwelling lines, tubes, and drains  - Monitor endotracheal if appropriate and nasal secretions for changes in amount and color  - Anderson appropriate cooling/warming therapies per order  - Administer medications as ordered  - Instruct and encourage patient and family to use good hand hygiene  technique  - Identify and instruct in appropriate isolation precautions for identified infection/condition  Outcome: Progressing  Goal: Absence of fever/infection during neutropenic period  Description: INTERVENTIONS:  - Monitor WBC    Outcome: Progressing     Problem: SAFETY ADULT  Goal: Patient will remain free of falls  Description: INTERVENTIONS:  - Educate patient/family on patient safety including physical limitations  - Instruct patient to call for assistance with activity   - Consult OT/PT to assist with strengthening/mobility   - Keep Call bell within reach  - Keep bed low and locked with side rails adjusted as appropriate  - Keep care items and personal belongings within reach  - Initiate and maintain comfort rounds  - Make Fall Risk Sign visible to staff  - Offer Toileting every 2 Hours, in advance of need  - Initiate/Maintain bed alarm  - Obtain necessary fall risk management equipment: walker  - Apply yellow socks and bracelet for high fall risk patients  - Consider moving patient to room near nurses station  Outcome: Progressing  Goal: Maintain or return to baseline ADL function  Description: INTERVENTIONS:  -  Assess patient's ability to carry out ADLs; assess patient's baseline for ADL function and identify physical deficits which impact ability to perform ADLs (bathing, care of mouth/teeth, toileting, grooming, dressing, etc.)  - Assess/evaluate cause of self-care deficits   - Assess range of motion  - Assess patient's mobility; develop plan if impaired  - Assess patient's need for assistive devices and provide as appropriate  - Encourage maximum independence but intervene and supervise when necessary  - Involve family in performance of ADLs  - Assess for home care needs following discharge   - Consider OT consult to assist with ADL evaluation and planning for discharge  - Provide patient education as appropriate  Outcome: Progressing  Goal: Maintains/Returns to pre admission functional  level  Description: INTERVENTIONS:  - Perform AM-PAC 6 Click Basic Mobility/ Daily Activity assessment daily.  - Set and communicate daily mobility goal to care team and patient/family/caregiver.   - Collaborate with rehabilitation services on mobility goals if consulted  - Perform Range of Motion 2 times a day.  - Reposition patient every 2 hours.  - Dangle patient 2 times a day  - Stand patient 2 times a day  - Ambulate patient 2 times a day  - Out of bed to chair 2 times a day   - Out of bed for meals 3 times a day  - Out of bed for toileting  - Record patient progress and toleration of activity level   Outcome: Progressing

## 2024-08-22 NOTE — PROGRESS NOTES
[facility]  Progress Note  Name: Augustus Coffman I  MRN: [id number]  Unit/Bed#: -01 I Date of Admission: 8/20/2024   Date of Service: 8/22/2024 I Hospital Day: 2    Assessment & Plan   New onset a-fib (HCC)  Assessment & Plan  New onset A-fib RVR with rates up to the 150s this morning  Also had chest pain with this  There was some concern for ST elevations (diffuse) on EKG - d/w cardiology who think likely pericarditis  Trop negative x 2   He was loaded with ASA    hep gtt  Converted to NSR and rates became controlled   Continue metop 25 TID   Follow up echo       Acute kidney failure (HCC)  Assessment & Plan  Resolved with IVF  Cr 1.08    Transaminitis  Assessment & Plan  Asymptomatic  RUQ u/s WNL  Trend LFT's    Cervical discitis  Assessment & Plan  Persistent neck pain  MSSA bacteremia  Found to have MRI findings suspicious for discitis osteomyelitis at C5-C6. 3 mm epidural phlegmon/small abscess is also suspected. There is moderate resultant central stenosis and mild mass effect on the cord.   NSGY consulted recommended IV abx. No acute intervention  Pain control     Diabetes 1.5, managed as type 2 ([facility])  Assessment & Plan  Lab Results   Component Value Date    HGBA1C 6.8 (H) 07/05/2024       Recent Labs     08/21/24  1610 08/21/24  2055 08/22/24  0743 08/22/24  1134   POCGLU 125 135 234* 344*         Blood Sugar Average: Last 72 hrs:  (P) [id number] sliding scale  Monitor glucose levels   Sliding scale     * MSSA bacteremia  Assessment & Plan  Admitted to [facility] in July 2024 for infected diabetic foot ulcer  Neck pain was persistent  Followed up with Ortho and MRI cervical spine was ordered  Discitis/OM at c5/6 with phlegmonous changes  Initially was accepted for [facility]  But no surgical intervention was planned  ID recommended long term antibiotics - ID following   Blood cultures MSSA. Repeat Bcx ordered for 8/22.   TTE ordered - follow up   D/w NSGY  regarding intolerance with cervical collar and they said ok to manage without c-collar for now               VTE Pharmacologic Prophylaxis: VTE Score: 3 Moderate Risk (Score 3-4) - Pharmacological DVT Prophylaxis Ordered: heparin drip.    Mobility:   Basic Mobility Inpatient Raw Score: 23  JH-HLM Goal: 7: Walk 25 feet or more  JH-HLM Achieved: 4: Move to chair/commode  JH-HLM Goal achieved. Continue to encourage appropriate mobility.    Patient Centered Rounds: I performed bedside rounds with nursing staff today.   Discussions with Specialists or Other Care Team Provider: ID, Cardiology     Education and Discussions with Family / Patient: Updated  (wife) at bedside.    Total Time Spent on Date of Encounter in care of patient: 45 mins. This time was spent on one or more of the following: performing physical exam; counseling and coordination of care; obtaining or reviewing history; documenting in the medical record; reviewing/ordering tests, medications or procedures; communicating with other healthcare professionals and discussing with patient's family/caregivers.    Current Length of Stay: 2 day(s)  Current Patient Status: Inpatient   Certification Statement: The patient will continue to require additional inpatient hospital stay due to MSSA bacteremia  Discharge Plan: Anticipate discharge in >72 hrs to TBD    Code Status: Level 1 - Full Code    Subjective:   Had episodes of chest pain with Afib RVR this morning. This resolved as HR became under control and with pain medications. Also was short of breath during these episodes. Now back to baseline.     Objective:     Vitals:   Temp (24hrs), Av.9 °F (37.2 °C), Min:97.8 °F (36.6 °C), Max:99.7 °F (37.6 °C)    Temp:  [97.8 °F (36.6 °C)-99.7 °F (37.6 °C)] 97.8 °F (36.6 °C)  HR:  [] 100  Resp:  [18] 18  BP: (101-117)/(70-78) 115/78  SpO2:  [94 %-96 %] 96 %  Body mass index is 31.65 kg/m².     Input and Output Summary (last 24 hours):      Intake/Output Summary (Last 24 hours) at 8/22/2024 1549  Last data filed at 8/22/2024 0550  Gross per 24 hour   Intake 150 ml   Output --   Net 150 ml       Physical Exam:   Physical Exam   NAD  Nonlabored resp on 2L  RRR  NTND abd   aaox3    Additional Data:     Labs:  Results from last 7 days   Lab Units 08/22/24  0550   WBC Thousand/uL 14.77*   HEMOGLOBIN g/dL 11.2*   HEMATOCRIT % 36.4*   PLATELETS Thousands/uL 303   SEGS PCT % 84*   LYMPHO PCT % 5*   MONO PCT % 9   EOS PCT % 1     Results from last 7 days   Lab Units 08/22/24  0550   SODIUM mmol/L 134*   POTASSIUM mmol/L 5.0   CHLORIDE mmol/L 97   CO2 mmol/L 26   BUN mg/dL 23   CREATININE mg/dL 1.08   ANION GAP mmol/L 11   CALCIUM mg/dL 10.0   ALBUMIN g/dL 3.8   TOTAL BILIRUBIN mg/dL 0.64   ALK PHOS U/L 180*   ALT U/L 89*   AST U/L 75*   GLUCOSE RANDOM mg/dL 210*     Results from last 7 days   Lab Units 08/22/24  0926   INR  1.21*     Results from last 7 days   Lab Units 08/22/24  1134 08/22/24  0743 08/21/24  2055 08/21/24  1610 08/21/24  1113 08/21/24  0743 08/20/24  2118   POC GLUCOSE mg/dl 344* 234* 135 125 164* 169* 163*         Results from last 7 days   Lab Units 08/20/24  1417   LACTIC ACID mmol/L 1.7       Lines/Drains:  Invasive Devices       Peripheral Intravenous Line  Duration             Peripheral IV 08/20/24 Left;Proximal;Ventral (anterior) Forearm 2 days    Peripheral IV 08/20/24 Right;Ventral (anterior) Forearm 2 days                      Telemetry:  Telemetry Orders (From admission, onward)               24 Hour Telemetry Monitoring  Continuous x 24 Hours (Telem)        Question:  Reason for 24 Hour Telemetry  Answer:  Arrhythmias requiring acute medical intervention / PPM or ICD malfunction                     Telemetry Reviewed: Normal Sinus Rhythm  Indication for Continued Telemetry Use: Arrthymias requiring medical therapy             Imaging: Reviewed radiology reports from this admission including: chest xray    Recent Cultures (last  7 days):   Results from last 7 days   Lab Units 08/22/24  0606 08/21/24  0103 08/20/24  1432 08/20/24  1430 08/20/24  1417   BLOOD CULTURE  Received in Microbiology Lab. Culture in Progress.  Received in Microbiology Lab. Culture in Progress.  --  Staphylococcus aureus* Staphylococcus aureus* Staphylococcus aureus*   GRAM STAIN RESULT   --   --  Gram positive cocci in clusters* Gram positive cocci in clusters* Gram positive cocci in clusters*   URINE CULTURE   --  10,000-19,000 cfu/ml Enterococcus species*  --   --   --        Last 24 Hours Medication List:   Current Facility-Administered Medications   Medication Dose Route Frequency Provider Last Rate    acetaminophen  1,000 mg Intravenous Q6H SRIDHAR Yogesh Stewart MD 1,000 mg (08/22/24 1226)    acetaminophen  650 mg Oral Q6H PRN Yogesh Stewart MD      atorvastatin  80 mg Oral Daily With Dinner Yogesh Stewart MD      calcium carbonate  1,000 mg Oral Daily PRN Yogesh Stewart MD      cefazolin  2,000 mg Intravenous Q8H Ioana Garvin MD 2,000 mg (08/22/24 0953)    colchicine  0.6 mg Oral BID Augustus Garcia PA-C      docusate sodium  100 mg Oral BID Yogesh Stewart MD      heparin (porcine)  3-20 Units/kg/hr (Order-Specific) Intravenous Titrated Augustus Garcia PA-C 11.1 Units/kg/hr (08/22/24 1011)    heparin (porcine)  2,000 Units Intravenous Q6H PRN Augustus Garcia PA-C      heparin (porcine)  4,000 Units Intravenous Q6H PRN Augustus Garcia PA-C      insulin lispro  1-6 Units Subcutaneous HS Yogesh Stewart MD      insulin lispro  1-6 Units Subcutaneous TID With Meals Yogesh Stewart MD      metoprolol tartrate  25 mg Oral Q6H Augustus Garcia PA-C      morphine injection  4 mg Intravenous Q4H PRN Bishop Farrar MD      ondansetron  4 mg Intravenous Q6H PRN Yogesh Stewart MD      oxyCODONE  10 mg Oral Q4H PRN Bishop Farrar MD      oxyCODONE  5 mg Oral Q4H PRN Bishop Farrar MD      senna  1 tablet Oral Daily Yogesh Pearson  MD Terry      tiZANidine  4 mg Oral Q8H PRN Bishop Farrar MD          Today, Patient Was Seen By: Bishop Farrar MD    **Please Note: This note may have been constructed using a voice recognition system.**

## 2024-08-22 NOTE — ASSESSMENT & PLAN NOTE
Persistent neck pain  MSSA bacteremia  Found to have MRI findings suspicious for discitis osteomyelitis at C5-C6. 3 mm epidural phlegmon/small abscess is also suspected. There is moderate resultant central stenosis and mild mass effect on the cord.   NSGY consulted recommended IV abx. No acute intervention  Pain control

## 2024-08-22 NOTE — PROGRESS NOTES
Progress Note - Infectious Disease   Augustus Coffman 68 y.o. male MRN: [id number]  Unit/Bed#: -01 Encounter: [id number]      Impression/Plan:  Discitis/osteomyelitis of C5-C6 with associated 3mm epidural phlegmon/small abscess. Continued with neck pain post-discharge and followed-up with orthopedics in the outpatient. Demonstrated on MRI from 8/20. Likely in setting of #2 and #3. ESR (75) and CRP (221) both elevated. Per neurosurgery, no indication for surgical intervention at this time given patient does not have any neurologic deficits. Patient will need a prolonged IV antibiotic course for osteomyelitis. Blood cultures on admission positive for Staph aureus, likely MSSA. Will continue cefazolin given the likely pathogen is MSSA in setting of bacteremia as below.    -Continue IV cefazolin 2g q8h  -Appreciate neurosurgery recommendations  -Will need prolonged IV antibiotic course for at least 6 weeks  -Will need repeat MRI prior to stopping antibiotics to ensure resolution of epidural abscess before completing antibiotic course  -Continue to trend weekly ESR/CRP  -Continue to follow CBCD and BMP with AM labs to monitor for potential antimicrobial toxicities and assess for clinical response to antibiotics.      MSSA bacteremia. Blood cultures positive 2 out of 2 for MSSA on 7/28 during last admission. Patient was treated with IV cefazolin for 7d course during last admission. Repeat blood cultures were not checked. TTE obtained at that time did not demonstrate evidence of valvular vegetation. Repeat blood cultures now during this admission also positive for GPCs in clusters in 2 out of 2 sets. No known indwelling hardware other than in his foot. Given duration of bacteremia, concern for endocarditis is high. Would repeat a TTE and potentially a LUCRECIA. Course now complicated by development of chest pain, diaphoresis, and EKG with diffuse ST elevations concerning for pericarditis. Cardiology consulted. Initial  troponins WNL.   -Continue antibiotics as above  -Obtain another TTE to assess for endocarditis. May need a LUCRECIA.   -Appreciate cardiology recommendations  -Follow-up repeat blood cultures obtained this AM (8/22) to assess for culture clearance  -Blood cultures need to be negative for at least 72h prior to PICC line placement     Right diabetic foot wound. Recent admission form 7/28-8/3 for sepsis 2/2 to right diabetic foot wound. Wound cultures positive for MSSA at that time. Likely source for #2 which led to development of #3. Now nearly completely healed.   -Continue local wound care     T2DM. Most recent A1C 6.8. Remains a risk factor for development of infection and poor wound healing.   -Continue with tight glycemic control.      FINA on CKD. Baseline appears to be between 0.9-1.1. Elevated to 1.36 on admission. Likely prerenal. Improved.   -Avoid nephrotoxins  -Will dose adjust antibiotics as needed  -Continue to trend serum creatinine.      Transaminitis. LFTs elevated on admission (AST//118). Alk phos also elevated. RUQ U/S did not demonstrate any acute changes. Demonstrated gallstones with gallbladder sludge without evidence of acute cholecystitis. Improved.   -Continue to monitor LFTs     Above plan was discussed in detail with patient at bedside.   Above plan was discussed in detail with primary service attending, who agrees with the plan to continue cefazolin, follow-up TTE, follow-up repeat blood cultures.    Antibiotics:  cefazolin    Subjective:  Patient diaphoretic and endorsing chest pain and nausea. No vomiting. Was given morphine and started on O2. States chest pain is worse when taking a deep breath and endorses shortness of breath. No fevers or chills. No abdominal pain, diarrhea, or dysuria. EKG obtained demonstrated ST elevations. Cardiology consulted. Troponin x 2 WNL currently.     Objective:  Vitals:  Temp:  [97.8 °F (36.6 °C)-99.7 °F (37.6 °C)] 97.8 °F (36.6 °C)  HR:  []  97  Resp:  [18] 18  BP: (101-117)/(70-78) 115/78  SpO2:  [94 %-98 %] 94 %  Temp (24hrs), Av.7 °F (37.1 °C), Min:97.8 °F (36.6 °C), Max:99.7 °F (37.6 °C)  Current: Temperature: 97.8 °F (36.6 °C)    Physical Exam:   General Appearance:  Uncomfortable, diaphorectic, clutching chest, pale.   Throat: Oropharynx moist without lesions.    Lungs:   Unable to fully assess given patient inability to take a deep breath without pain. Decreased breath sounds throughout. Tachypneic.    Heart:  Tachycardic, +murmur   Abdomen:   Soft, non-tender, non-distended, positive bowel sounds.     Extremities: No clubbing or cyanosis, no edema.   Skin: No new rashes, lesions, or draining wounds noted on exposed skin.     Labs, Imaging, & Other studies:   All pertinent labs and imaging studies were personally reviewed  Results from last 7 days   Lab Units 24  0550 24  0521 24  1417   WBC Thousand/uL 14.77* 13.91* 9.61   HEMOGLOBIN g/dL 11.2* 11.7* 10.5*   PLATELETS Thousands/uL 303 270 245     Results from last 7 days   Lab Units 24  0550 24  0521 24  1417   POTASSIUM mmol/L 5.0 4.6 5.0   CHLORIDE mmol/L 97 99 97   CO2 mmol/L 26 26 30   BUN mg/dL 23 27* 39*   CREATININE mg/dL 1.08 1.03 1.36*   EGFR ml/min/1.73sq m 70 74 53   CALCIUM mg/dL 10.0 9.7 10.3*   AST U/L 75* 120* 107*   ALT U/L 89* 135* 118*   ALK PHOS U/L 180* 157* 116*     Results from last 7 days   Lab Units 24  0606 24  0103 24  1432 24  1430 24  1417   BLOOD CULTURE  Received in Microbiology Lab. Culture in Progress.  Received in Microbiology Lab. Culture in Progress.  --  Staphylococcus aureus* Staphylococcus aureus* Staphylococcus aureus*   GRAM STAIN RESULT   --   --  Gram positive cocci in clusters* Gram positive cocci in clusters* Gram positive cocci in clusters*   URINE CULTURE   --  10,000-19,000 cfu/ml Enterococcus species*  --   --   --          Results from last 7 days   Lab Units 24  2969    CRP mg/L 221.0*                 Imaging Studies:  I have personally reviewed pertinent imaging study reports and images in PACS.       Carolyn Goode PA-C  [facility]

## 2024-08-23 ENCOUNTER — HOSPITAL ENCOUNTER (INPATIENT)
Facility: HOSPITAL | Age: 69
LOS: 11 days | DRG: 853 | End: 2024-09-03
Attending: STUDENT IN AN ORGANIZED HEALTH CARE EDUCATION/TRAINING PROGRAM | Admitting: STUDENT IN AN ORGANIZED HEALTH CARE EDUCATION/TRAINING PROGRAM
Payer: MEDICARE

## 2024-08-23 ENCOUNTER — APPOINTMENT (INPATIENT)
Dept: RADIOLOGY | Facility: HOSPITAL | Age: 69
DRG: 551 | End: 2024-08-23
Payer: MEDICARE

## 2024-08-23 VITALS
BODY MASS INDEX: 31.66 KG/M2 | HEIGHT: 76 IN | DIASTOLIC BLOOD PRESSURE: 50 MMHG | WEIGHT: 260 LBS | SYSTOLIC BLOOD PRESSURE: 73 MMHG | RESPIRATION RATE: 16 BRPM | TEMPERATURE: 97.5 F | OXYGEN SATURATION: 94 % | HEART RATE: 147 BPM

## 2024-08-23 DIAGNOSIS — R78.81 MSSA BACTEREMIA: ICD-10-CM

## 2024-08-23 DIAGNOSIS — M46.22 ACUTE OSTEOMYELITIS OF CERVICAL SPINE (HCC): ICD-10-CM

## 2024-08-23 DIAGNOSIS — R34: ICD-10-CM

## 2024-08-23 DIAGNOSIS — N17.9: ICD-10-CM

## 2024-08-23 DIAGNOSIS — M54.2 NECK PAIN: ICD-10-CM

## 2024-08-23 DIAGNOSIS — I48.91 NEW ONSET A-FIB (HCC): ICD-10-CM

## 2024-08-23 DIAGNOSIS — E87.5 HYPERKALEMIA: ICD-10-CM

## 2024-08-23 DIAGNOSIS — M46.40 DISCITIS: ICD-10-CM

## 2024-08-23 DIAGNOSIS — E11.621 DIABETIC ULCER OF RIGHT FOOT (HCC): ICD-10-CM

## 2024-08-23 DIAGNOSIS — L97.519 DIABETIC ULCER OF RIGHT FOOT (HCC): ICD-10-CM

## 2024-08-23 DIAGNOSIS — I31.39 PERICARDIAL EFFUSION: Primary | ICD-10-CM

## 2024-08-23 DIAGNOSIS — B95.61 MSSA BACTEREMIA: ICD-10-CM

## 2024-08-23 DIAGNOSIS — K59.00 CONSTIPATION, UNSPECIFIED CONSTIPATION TYPE: ICD-10-CM

## 2024-08-23 PROBLEM — I95.9 HYPOTENSION: Status: ACTIVE | Noted: 2024-08-23

## 2024-08-23 PROBLEM — R52 ACUTE PAIN: Status: ACTIVE | Noted: 2024-08-23

## 2024-08-23 PROBLEM — I30.9 ACUTE PERICARDITIS: Status: ACTIVE | Noted: 2024-08-23

## 2024-08-23 PROBLEM — R06.89 ACUTE RESPIRATORY INSUFFICIENCY: Status: ACTIVE | Noted: 2024-08-23

## 2024-08-23 LAB
ALBUMIN SERPL BCG-MCNC: 3.3 G/DL (ref 3.5–5)
ALBUMIN SERPL BCG-MCNC: 3.5 G/DL (ref 3.5–5)
ALP SERPL-CCNC: 181 U/L (ref 34–104)
ALP SERPL-CCNC: 187 U/L (ref 34–104)
ALT SERPL W P-5'-P-CCNC: 34 U/L (ref 7–52)
ALT SERPL W P-5'-P-CCNC: 49 U/L (ref 7–52)
AMORPH URATE CRY URNS QL MICRO: ABNORMAL
ANION GAP SERPL CALCULATED.3IONS-SCNC: 14 MMOL/L (ref 4–13)
ANION GAP SERPL CALCULATED.3IONS-SCNC: 14 MMOL/L (ref 4–13)
ANION GAP SERPL CALCULATED.3IONS-SCNC: 15 MMOL/L (ref 4–13)
ANION GAP SERPL CALCULATED.3IONS-SCNC: 16 MMOL/L (ref 4–13)
APTT PPP: 44 SECONDS (ref 23–34)
APTT PPP: 53 SECONDS (ref 23–34)
APTT PPP: 59 SECONDS (ref 23–34)
APTT PPP: 61 SECONDS (ref 23–34)
AST SERPL W P-5'-P-CCNC: 162 U/L (ref 13–39)
AST SERPL W P-5'-P-CCNC: 75 U/L (ref 13–39)
ATRIAL RATE: 100 BPM
ATRIAL RATE: 100 BPM
ATRIAL RATE: 101 BPM
ATRIAL RATE: 97 BPM
BACTERIA BLD CULT: ABNORMAL
BACTERIA BLD CULT: ABNORMAL
BACTERIA UR CULT: ABNORMAL
BACTERIA UR QL AUTO: ABNORMAL /HPF
BASE EX.OXY STD BLDV CALC-SCNC: 53.3 % (ref 60–80)
BASE EXCESS BLDV CALC-SCNC: -0.7 MMOL/L
BASOPHILS # BLD AUTO: 0.1 THOUSANDS/ÂΜL (ref 0–0.1)
BASOPHILS # BLD AUTO: 0.11 THOUSANDS/ÂΜL (ref 0–0.1)
BASOPHILS NFR BLD AUTO: 0 % (ref 0–1)
BASOPHILS NFR BLD AUTO: 0 % (ref 0–1)
BILIRUB DIRECT SERPL-MCNC: 0.22 MG/DL (ref 0–0.2)
BILIRUB SERPL-MCNC: 0.61 MG/DL (ref 0.2–1)
BILIRUB SERPL-MCNC: 0.63 MG/DL (ref 0.2–1)
BILIRUB UR QL STRIP: NEGATIVE
BODY TEMPERATURE: 97.5 DEGREES FEHRENHEIT
BUN SERPL-MCNC: 43 MG/DL (ref 5–25)
BUN SERPL-MCNC: 46 MG/DL (ref 5–25)
BUN SERPL-MCNC: 47 MG/DL (ref 5–25)
BUN SERPL-MCNC: 49 MG/DL (ref 5–25)
CA-I BLD-SCNC: 1.19 MMOL/L (ref 1.12–1.32)
CALCIUM ALBUM COR SERPL-MCNC: 10.6 MG/DL (ref 8.3–10.1)
CALCIUM SERPL-MCNC: 10 MG/DL (ref 8.4–10.2)
CALCIUM SERPL-MCNC: 10 MG/DL (ref 8.4–10.2)
CALCIUM SERPL-MCNC: 9.4 MG/DL (ref 8.4–10.2)
CALCIUM SERPL-MCNC: 9.9 MG/DL (ref 8.4–10.2)
CHLORIDE SERPL-SCNC: 92 MMOL/L (ref 96–108)
CHLORIDE SERPL-SCNC: 93 MMOL/L (ref 96–108)
CHLORIDE SERPL-SCNC: 94 MMOL/L (ref 96–108)
CHLORIDE SERPL-SCNC: 95 MMOL/L (ref 96–108)
CLARITY UR: ABNORMAL
CO2 SERPL-SCNC: 21 MMOL/L (ref 21–32)
CO2 SERPL-SCNC: 22 MMOL/L (ref 21–32)
CO2 SERPL-SCNC: 23 MMOL/L (ref 21–32)
CO2 SERPL-SCNC: 25 MMOL/L (ref 21–32)
COLOR UR: YELLOW
CREAT SERPL-MCNC: 2.71 MG/DL (ref 0.6–1.3)
CREAT SERPL-MCNC: 2.91 MG/DL (ref 0.6–1.3)
CREAT SERPL-MCNC: 2.92 MG/DL (ref 0.6–1.3)
CREAT SERPL-MCNC: 3.02 MG/DL (ref 0.6–1.3)
EOSINOPHIL # BLD AUTO: 0 THOUSAND/ÂΜL (ref 0–0.61)
EOSINOPHIL # BLD AUTO: 0.02 THOUSAND/ÂΜL (ref 0–0.61)
EOSINOPHIL NFR BLD AUTO: 0 % (ref 0–6)
EOSINOPHIL NFR BLD AUTO: 0 % (ref 0–6)
ERYTHROCYTE [DISTWIDTH] IN BLOOD BY AUTOMATED COUNT: 16.8 % (ref 11.6–15.1)
ERYTHROCYTE [DISTWIDTH] IN BLOOD BY AUTOMATED COUNT: 17 % (ref 11.6–15.1)
GFR SERPL CREATININE-BSD FRML MDRD: 20 ML/MIN/1.73SQ M
GFR SERPL CREATININE-BSD FRML MDRD: 21 ML/MIN/1.73SQ M
GFR SERPL CREATININE-BSD FRML MDRD: 21 ML/MIN/1.73SQ M
GFR SERPL CREATININE-BSD FRML MDRD: 23 ML/MIN/1.73SQ M
GLUCOSE SERPL-MCNC: 189 MG/DL (ref 65–140)
GLUCOSE SERPL-MCNC: 190 MG/DL (ref 65–140)
GLUCOSE SERPL-MCNC: 199 MG/DL (ref 65–140)
GLUCOSE SERPL-MCNC: 207 MG/DL (ref 65–140)
GLUCOSE SERPL-MCNC: 217 MG/DL (ref 65–140)
GLUCOSE SERPL-MCNC: 220 MG/DL (ref 65–140)
GLUCOSE SERPL-MCNC: 222 MG/DL (ref 65–140)
GLUCOSE SERPL-MCNC: 225 MG/DL (ref 65–140)
GLUCOSE SERPL-MCNC: 231 MG/DL (ref 65–140)
GLUCOSE SERPL-MCNC: 284 MG/DL (ref 65–140)
GLUCOSE UR STRIP-MCNC: ABNORMAL MG/DL
GRAM STN SPEC: ABNORMAL
GRAM STN SPEC: ABNORMAL
HCO3 BLDV-SCNC: 27.1 MMOL/L (ref 24–30)
HCT VFR BLD AUTO: 34.6 % (ref 36.5–49.3)
HCT VFR BLD AUTO: 36.3 % (ref 36.5–49.3)
HGB BLD-MCNC: 10.5 G/DL (ref 12–17)
HGB BLD-MCNC: 11.2 G/DL (ref 12–17)
HGB UR QL STRIP.AUTO: ABNORMAL
IMM GRANULOCYTES # BLD AUTO: 0.22 THOUSAND/UL (ref 0–0.2)
IMM GRANULOCYTES # BLD AUTO: 0.39 THOUSAND/UL (ref 0–0.2)
IMM GRANULOCYTES NFR BLD AUTO: 1 % (ref 0–2)
IMM GRANULOCYTES NFR BLD AUTO: 1 % (ref 0–2)
KETONES UR STRIP-MCNC: NEGATIVE MG/DL
LACTATE SERPL-SCNC: 2.5 MMOL/L (ref 0.5–2)
LACTATE SERPL-SCNC: 3 MMOL/L (ref 0.5–2)
LEUKOCYTE ESTERASE UR QL STRIP: NEGATIVE
LYMPHOCYTES # BLD AUTO: 0.87 THOUSANDS/ÂΜL (ref 0.6–4.47)
LYMPHOCYTES # BLD AUTO: 0.95 THOUSANDS/ÂΜL (ref 0.6–4.47)
LYMPHOCYTES NFR BLD AUTO: 3 % (ref 14–44)
LYMPHOCYTES NFR BLD AUTO: 4 % (ref 14–44)
MAGNESIUM SERPL-MCNC: 2.2 MG/DL (ref 1.9–2.7)
MAGNESIUM SERPL-MCNC: 2.2 MG/DL (ref 1.9–2.7)
MAGNESIUM SERPL-MCNC: 2.4 MG/DL (ref 1.9–2.7)
MAGNESIUM SERPL-MCNC: 2.5 MG/DL (ref 1.9–2.7)
MCH RBC QN AUTO: 24.8 PG (ref 26.8–34.3)
MCH RBC QN AUTO: 25.3 PG (ref 26.8–34.3)
MCHC RBC AUTO-ENTMCNC: 30.3 G/DL (ref 31.4–37.4)
MCHC RBC AUTO-ENTMCNC: 30.9 G/DL (ref 31.4–37.4)
MCV RBC AUTO: 82 FL (ref 82–98)
MCV RBC AUTO: 82 FL (ref 82–98)
MONOCYTES # BLD AUTO: 1.88 THOUSAND/ÂΜL (ref 0.17–1.22)
MONOCYTES # BLD AUTO: 2.21 THOUSAND/ÂΜL (ref 0.17–1.22)
MONOCYTES NFR BLD AUTO: 7 % (ref 4–12)
MONOCYTES NFR BLD AUTO: 8 % (ref 4–12)
NASAL CANNULA: 6
NEUTROPHILS # BLD AUTO: 22.15 THOUSANDS/ÂΜL (ref 1.85–7.62)
NEUTROPHILS # BLD AUTO: 24.45 THOUSANDS/ÂΜL (ref 1.85–7.62)
NEUTS SEG NFR BLD AUTO: 88 % (ref 43–75)
NEUTS SEG NFR BLD AUTO: 88 % (ref 43–75)
NITRITE UR QL STRIP: NEGATIVE
NON-SQ EPI CELLS URNS QL MICRO: ABNORMAL /HPF
NRBC BLD AUTO-RTO: 0 /100 WBCS
NRBC BLD AUTO-RTO: 0 /100 WBCS
O2 CT BLDV-SCNC: 8.6 ML/DL
P AXIS: 30 DEGREES
P AXIS: 45 DEGREES
P AXIS: 62 DEGREES
P AXIS: 62 DEGREES
PCO2 BLDV: 60.3 MM HG (ref 42–50)
PH BLDV: 7.27 [PH] (ref 7.3–7.4)
PH UR STRIP.AUTO: 5.5 [PH]
PHOSPHATE SERPL-MCNC: 7.2 MG/DL (ref 2.3–4.1)
PHOSPHATE SERPL-MCNC: 7.7 MG/DL (ref 2.3–4.1)
PHOSPHATE SERPL-MCNC: 8 MG/DL (ref 2.3–4.1)
PLATELET # BLD AUTO: 356 THOUSANDS/UL (ref 149–390)
PLATELET # BLD AUTO: 430 THOUSANDS/UL (ref 149–390)
PMV BLD AUTO: 10.2 FL (ref 8.9–12.7)
PMV BLD AUTO: 9.8 FL (ref 8.9–12.7)
PO2 BLDV: 33 MM HG (ref 35–45)
POTASSIUM SERPL-SCNC: 5.5 MMOL/L (ref 3.5–5.3)
POTASSIUM SERPL-SCNC: 5.7 MMOL/L (ref 3.5–5.3)
POTASSIUM SERPL-SCNC: 6 MMOL/L (ref 3.5–5.3)
POTASSIUM SERPL-SCNC: 6.3 MMOL/L (ref 3.5–5.3)
PR INTERVAL: 180 MS
PR INTERVAL: 182 MS
PR INTERVAL: 184 MS
PR INTERVAL: 190 MS
PROT SERPL-MCNC: 7.4 G/DL (ref 6.4–8.4)
PROT SERPL-MCNC: 7.7 G/DL (ref 6.4–8.4)
PROT UR STRIP-MCNC: ABNORMAL MG/DL
QRS AXIS: -4 DEGREES
QRS AXIS: -4 DEGREES
QRS AXIS: -7 DEGREES
QRS AXIS: -9 DEGREES
QRSD INTERVAL: 68 MS
QRSD INTERVAL: 70 MS
QRSD INTERVAL: 70 MS
QRSD INTERVAL: 72 MS
QT INTERVAL: 330 MS
QT INTERVAL: 330 MS
QT INTERVAL: 336 MS
QT INTERVAL: 344 MS
QTC INTERVAL: 425 MS
QTC INTERVAL: 427 MS
QTC INTERVAL: 433 MS
QTC INTERVAL: 436 MS
RBC # BLD AUTO: 4.23 MILLION/UL (ref 3.88–5.62)
RBC # BLD AUTO: 4.43 MILLION/UL (ref 3.88–5.62)
RBC #/AREA URNS AUTO: ABNORMAL /HPF
S AUREUS DNA BLD POS QL NAA+NON-PROBE: DETECTED
SODIUM SERPL-SCNC: 129 MMOL/L (ref 135–147)
SODIUM SERPL-SCNC: 131 MMOL/L (ref 135–147)
SODIUM SERPL-SCNC: 132 MMOL/L (ref 135–147)
SODIUM SERPL-SCNC: 132 MMOL/L (ref 135–147)
SP GR UR STRIP.AUTO: 1.02 (ref 1–1.03)
T WAVE AXIS: 32 DEGREES
T WAVE AXIS: 38 DEGREES
T WAVE AXIS: 54 DEGREES
T WAVE AXIS: 58 DEGREES
UROBILINOGEN UR STRIP-ACNC: <2 MG/DL
VENTRICULAR RATE: 100 BPM
VENTRICULAR RATE: 100 BPM
VENTRICULAR RATE: 101 BPM
VENTRICULAR RATE: 97 BPM
WBC # BLD AUTO: 25.32 THOUSAND/UL (ref 4.31–10.16)
WBC # BLD AUTO: 28.03 THOUSAND/UL (ref 4.31–10.16)
WBC #/AREA URNS AUTO: ABNORMAL /HPF

## 2024-08-23 PROCEDURE — 85730 THROMBOPLASTIN TIME PARTIAL: CPT | Performed by: STUDENT IN AN ORGANIZED HEALTH CARE EDUCATION/TRAINING PROGRAM

## 2024-08-23 PROCEDURE — NC001 PR NO CHARGE

## 2024-08-23 PROCEDURE — 99223 1ST HOSP IP/OBS HIGH 75: CPT | Performed by: INTERNAL MEDICINE

## 2024-08-23 PROCEDURE — 93308 TTE F-UP OR LMTD: CPT

## 2024-08-23 PROCEDURE — 99233 SBSQ HOSP IP/OBS HIGH 50: CPT | Performed by: INTERNAL MEDICINE

## 2024-08-23 PROCEDURE — 81001 URINALYSIS AUTO W/SCOPE: CPT | Performed by: PHYSICIAN ASSISTANT

## 2024-08-23 PROCEDURE — 93010 ELECTROCARDIOGRAM REPORT: CPT | Performed by: INTERNAL MEDICINE

## 2024-08-23 PROCEDURE — 84100 ASSAY OF PHOSPHORUS: CPT

## 2024-08-23 PROCEDURE — 02HV33Z INSERTION OF INFUSION DEVICE INTO SUPERIOR VENA CAVA, PERCUTANEOUS APPROACH: ICD-10-PCS | Performed by: ANESTHESIOLOGY

## 2024-08-23 PROCEDURE — 99233 SBSQ HOSP IP/OBS HIGH 50: CPT | Performed by: STUDENT IN AN ORGANIZED HEALTH CARE EDUCATION/TRAINING PROGRAM

## 2024-08-23 PROCEDURE — 83735 ASSAY OF MAGNESIUM: CPT

## 2024-08-23 PROCEDURE — NC001 PR NO CHARGE: Performed by: RADIOLOGY

## 2024-08-23 PROCEDURE — 83735 ASSAY OF MAGNESIUM: CPT | Performed by: STUDENT IN AN ORGANIZED HEALTH CARE EDUCATION/TRAINING PROGRAM

## 2024-08-23 PROCEDURE — 83735 ASSAY OF MAGNESIUM: CPT | Performed by: PHYSICIAN ASSISTANT

## 2024-08-23 PROCEDURE — 80048 BASIC METABOLIC PNL TOTAL CA: CPT | Performed by: PHYSICIAN ASSISTANT

## 2024-08-23 PROCEDURE — 85730 THROMBOPLASTIN TIME PARTIAL: CPT

## 2024-08-23 PROCEDURE — 36620 INSERTION CATHETER ARTERY: CPT | Performed by: PHYSICIAN ASSISTANT

## 2024-08-23 PROCEDURE — 4A133B1 MONITORING OF ARTERIAL PRESSURE, PERIPHERAL, PERCUTANEOUS APPROACH: ICD-10-PCS | Performed by: ANESTHESIOLOGY

## 2024-08-23 PROCEDURE — 93005 ELECTROCARDIOGRAM TRACING: CPT

## 2024-08-23 PROCEDURE — NC001 PR NO CHARGE: Performed by: PHYSICIAN ASSISTANT

## 2024-08-23 PROCEDURE — 82948 REAGENT STRIP/BLOOD GLUCOSE: CPT

## 2024-08-23 PROCEDURE — 85025 COMPLETE CBC W/AUTO DIFF WBC: CPT | Performed by: STUDENT IN AN ORGANIZED HEALTH CARE EDUCATION/TRAINING PROGRAM

## 2024-08-23 PROCEDURE — 99232 SBSQ HOSP IP/OBS MODERATE 35: CPT | Performed by: INTERNAL MEDICINE

## 2024-08-23 PROCEDURE — 4A133B1 MONITORING OF ARTERIAL PRESSURE, PERIPHERAL, PERCUTANEOUS APPROACH: ICD-10-PCS | Performed by: STUDENT IN AN ORGANIZED HEALTH CARE EDUCATION/TRAINING PROGRAM

## 2024-08-23 PROCEDURE — 80076 HEPATIC FUNCTION PANEL: CPT

## 2024-08-23 PROCEDURE — 99238 HOSP IP/OBS DSCHRG MGMT 30/<: CPT | Performed by: NURSE PRACTITIONER

## 2024-08-23 PROCEDURE — 99291 CRITICAL CARE FIRST HOUR: CPT | Performed by: PHYSICIAN ASSISTANT

## 2024-08-23 PROCEDURE — 83605 ASSAY OF LACTIC ACID: CPT | Performed by: PHYSICIAN ASSISTANT

## 2024-08-23 PROCEDURE — 03HY32Z INSERTION OF MONITORING DEVICE INTO UPPER ARTERY, PERCUTANEOUS APPROACH: ICD-10-PCS | Performed by: STUDENT IN AN ORGANIZED HEALTH CARE EDUCATION/TRAINING PROGRAM

## 2024-08-23 PROCEDURE — 80048 BASIC METABOLIC PNL TOTAL CA: CPT | Performed by: STUDENT IN AN ORGANIZED HEALTH CARE EDUCATION/TRAINING PROGRAM

## 2024-08-23 PROCEDURE — 03HY32Z INSERTION OF MONITORING DEVICE INTO UPPER ARTERY, PERCUTANEOUS APPROACH: ICD-10-PCS | Performed by: ANESTHESIOLOGY

## 2024-08-23 PROCEDURE — 4A133J1 MONITORING OF ARTERIAL PULSE, PERIPHERAL, PERCUTANEOUS APPROACH: ICD-10-PCS | Performed by: ANESTHESIOLOGY

## 2024-08-23 PROCEDURE — 82805 BLOOD GASES W/O2 SATURATION: CPT | Performed by: PHYSICIAN ASSISTANT

## 2024-08-23 PROCEDURE — 02HV33Z INSERTION OF INFUSION DEVICE INTO SUPERIOR VENA CAVA, PERCUTANEOUS APPROACH: ICD-10-PCS | Performed by: RADIOLOGY

## 2024-08-23 PROCEDURE — 4A133J1 MONITORING OF ARTERIAL PULSE, PERIPHERAL, PERCUTANEOUS APPROACH: ICD-10-PCS | Performed by: STUDENT IN AN ORGANIZED HEALTH CARE EDUCATION/TRAINING PROGRAM

## 2024-08-23 PROCEDURE — 94660 CPAP INITIATION&MGMT: CPT

## 2024-08-23 PROCEDURE — 80048 BASIC METABOLIC PNL TOTAL CA: CPT

## 2024-08-23 PROCEDURE — 82330 ASSAY OF CALCIUM: CPT | Performed by: PHYSICIAN ASSISTANT

## 2024-08-23 PROCEDURE — 76937 US GUIDE VASCULAR ACCESS: CPT

## 2024-08-23 PROCEDURE — 84100 ASSAY OF PHOSPHORUS: CPT | Performed by: PHYSICIAN ASSISTANT

## 2024-08-23 PROCEDURE — 36620 INSERTION CATHETER ARTERY: CPT

## 2024-08-23 PROCEDURE — 87040 BLOOD CULTURE FOR BACTERIA: CPT | Performed by: PHYSICIAN ASSISTANT

## 2024-08-23 PROCEDURE — 87081 CULTURE SCREEN ONLY: CPT | Performed by: PHYSICIAN ASSISTANT

## 2024-08-23 PROCEDURE — 85025 COMPLETE CBC W/AUTO DIFF WBC: CPT | Performed by: PHYSICIAN ASSISTANT

## 2024-08-23 PROCEDURE — 71045 X-RAY EXAM CHEST 1 VIEW: CPT

## 2024-08-23 PROCEDURE — 83605 ASSAY OF LACTIC ACID: CPT

## 2024-08-23 PROCEDURE — 80053 COMPREHEN METABOLIC PANEL: CPT | Performed by: PHYSICIAN ASSISTANT

## 2024-08-23 PROCEDURE — 36556 INSERT NON-TUNNEL CV CATH: CPT

## 2024-08-23 RX ORDER — SENNOSIDES 8.6 MG
1 TABLET ORAL DAILY
Status: DISCONTINUED | OUTPATIENT
Start: 2024-08-24 | End: 2024-08-27

## 2024-08-23 RX ORDER — METOPROLOL TARTRATE 1 MG/ML
5 INJECTION, SOLUTION INTRAVENOUS EVERY 6 HOURS PRN
Status: DISCONTINUED | OUTPATIENT
Start: 2024-08-23 | End: 2024-08-24

## 2024-08-23 RX ORDER — TIZANIDINE 2 MG/1
4 TABLET ORAL EVERY 8 HOURS PRN
Status: CANCELLED | OUTPATIENT
Start: 2024-08-23

## 2024-08-23 RX ORDER — CEFAZOLIN SODIUM 2 G/50ML
2000 SOLUTION INTRAVENOUS EVERY 8 HOURS
Status: CANCELLED | OUTPATIENT
Start: 2024-08-24

## 2024-08-23 RX ORDER — METHOCARBAMOL 750 MG/1
750 TABLET, FILM COATED ORAL EVERY 6 HOURS SCHEDULED
Status: DISCONTINUED | OUTPATIENT
Start: 2024-08-23 | End: 2024-09-03 | Stop reason: HOSPADM

## 2024-08-23 RX ORDER — HEPARIN SODIUM 10000 [USP'U]/100ML
3-20 INJECTION, SOLUTION INTRAVENOUS
Status: DISCONTINUED | OUTPATIENT
Start: 2024-08-23 | End: 2024-08-27

## 2024-08-23 RX ORDER — SODIUM CHLORIDE, SODIUM GLUCONATE, SODIUM ACETATE, POTASSIUM CHLORIDE, MAGNESIUM CHLORIDE, SODIUM PHOSPHATE, DIBASIC, AND POTASSIUM PHOSPHATE .53; .5; .37; .037; .03; .012; .00082 G/100ML; G/100ML; G/100ML; G/100ML; G/100ML; G/100ML; G/100ML
100 INJECTION, SOLUTION INTRAVENOUS CONTINUOUS
Status: DISCONTINUED | OUTPATIENT
Start: 2024-08-23 | End: 2024-08-24

## 2024-08-23 RX ORDER — METOPROLOL TARTRATE 1 MG/ML
5 INJECTION, SOLUTION INTRAVENOUS EVERY 6 HOURS PRN
Status: DISCONTINUED | OUTPATIENT
Start: 2024-08-23 | End: 2024-08-23 | Stop reason: HOSPADM

## 2024-08-23 RX ORDER — COLCHICINE 0.6 MG/1
0.6 TABLET ORAL 2 TIMES DAILY
Status: DISCONTINUED | OUTPATIENT
Start: 2024-08-23 | End: 2024-08-27

## 2024-08-23 RX ORDER — ACETAMINOPHEN 10 MG/ML
1000 INJECTION, SOLUTION INTRAVENOUS EVERY 6 HOURS SCHEDULED
Status: CANCELLED | OUTPATIENT
Start: 2024-08-23

## 2024-08-23 RX ORDER — HEPARIN SODIUM 10000 [USP'U]/100ML
3-20 INJECTION, SOLUTION INTRAVENOUS
Status: CANCELLED | OUTPATIENT
Start: 2024-08-23

## 2024-08-23 RX ORDER — HYDROMORPHONE HCL IN WATER/PF 6 MG/30 ML
0.2 PATIENT CONTROLLED ANALGESIA SYRINGE INTRAVENOUS ONCE
Status: COMPLETED | OUTPATIENT
Start: 2024-08-23 | End: 2024-08-23

## 2024-08-23 RX ORDER — ONDANSETRON 2 MG/ML
4 INJECTION INTRAMUSCULAR; INTRAVENOUS EVERY 6 HOURS PRN
Status: DISCONTINUED | OUTPATIENT
Start: 2024-08-23 | End: 2024-09-03 | Stop reason: HOSPADM

## 2024-08-23 RX ORDER — CALCIUM GLUCONATE 20 MG/ML
1 INJECTION, SOLUTION INTRAVENOUS ONCE
Status: COMPLETED | OUTPATIENT
Start: 2024-08-23 | End: 2024-08-23

## 2024-08-23 RX ORDER — ATORVASTATIN CALCIUM 80 MG/1
80 TABLET, FILM COATED ORAL
Status: DISCONTINUED | OUTPATIENT
Start: 2024-08-24 | End: 2024-08-24

## 2024-08-23 RX ORDER — MORPHINE SULFATE 4 MG/ML
4 INJECTION, SOLUTION INTRAMUSCULAR; INTRAVENOUS EVERY 4 HOURS PRN
Status: CANCELLED | OUTPATIENT
Start: 2024-08-23

## 2024-08-23 RX ORDER — HEPARIN SODIUM 1000 [USP'U]/ML
4000 INJECTION, SOLUTION INTRAVENOUS; SUBCUTANEOUS EVERY 6 HOURS PRN
Status: CANCELLED | OUTPATIENT
Start: 2024-08-23

## 2024-08-23 RX ORDER — ACETAMINOPHEN 325 MG/1
650 TABLET ORAL EVERY 6 HOURS PRN
Status: DISCONTINUED | OUTPATIENT
Start: 2024-08-23 | End: 2024-08-25

## 2024-08-23 RX ORDER — HEPARIN SODIUM 1000 [USP'U]/ML
2000 INJECTION, SOLUTION INTRAVENOUS; SUBCUTANEOUS EVERY 6 HOURS PRN
Status: DISCONTINUED | OUTPATIENT
Start: 2024-08-23 | End: 2024-08-27

## 2024-08-23 RX ORDER — DEXTROSE MONOHYDRATE 25 G/50ML
25 INJECTION, SOLUTION INTRAVENOUS ONCE
Status: COMPLETED | OUTPATIENT
Start: 2024-08-23 | End: 2024-08-23

## 2024-08-23 RX ORDER — CEFAZOLIN SODIUM 2 G/50ML
2000 SOLUTION INTRAVENOUS EVERY 8 HOURS
Status: DISCONTINUED | OUTPATIENT
Start: 2024-08-24 | End: 2024-08-23

## 2024-08-23 RX ORDER — BUMETANIDE 0.25 MG/ML
2 INJECTION INTRAMUSCULAR; INTRAVENOUS ONCE
Status: COMPLETED | OUTPATIENT
Start: 2024-08-23 | End: 2024-08-23

## 2024-08-23 RX ORDER — ONDANSETRON 2 MG/ML
4 INJECTION INTRAMUSCULAR; INTRAVENOUS EVERY 6 HOURS PRN
Status: CANCELLED | OUTPATIENT
Start: 2024-08-23

## 2024-08-23 RX ORDER — SODIUM CHLORIDE, SODIUM GLUCONATE, SODIUM ACETATE, POTASSIUM CHLORIDE, MAGNESIUM CHLORIDE, SODIUM PHOSPHATE, DIBASIC, AND POTASSIUM PHOSPHATE .53; .5; .37; .037; .03; .012; .00082 G/100ML; G/100ML; G/100ML; G/100ML; G/100ML; G/100ML; G/100ML
500 INJECTION, SOLUTION INTRAVENOUS ONCE
Status: COMPLETED | OUTPATIENT
Start: 2024-08-23 | End: 2024-08-23

## 2024-08-23 RX ORDER — DOCUSATE SODIUM 100 MG/1
100 CAPSULE, LIQUID FILLED ORAL 2 TIMES DAILY
Status: CANCELLED | OUTPATIENT
Start: 2024-08-23

## 2024-08-23 RX ORDER — INSULIN LISPRO 100 [IU]/ML
1-6 INJECTION, SOLUTION INTRAVENOUS; SUBCUTANEOUS
Status: CANCELLED | OUTPATIENT
Start: 2024-08-24

## 2024-08-23 RX ORDER — SENNOSIDES 8.6 MG
1 TABLET ORAL DAILY
Status: CANCELLED | OUTPATIENT
Start: 2024-08-24

## 2024-08-23 RX ORDER — ACETAMINOPHEN 325 MG/1
650 TABLET ORAL EVERY 6 HOURS PRN
Status: CANCELLED | OUTPATIENT
Start: 2024-08-23

## 2024-08-23 RX ORDER — SODIUM CHLORIDE, SODIUM GLUCONATE, SODIUM ACETATE, POTASSIUM CHLORIDE, MAGNESIUM CHLORIDE, SODIUM PHOSPHATE, DIBASIC, AND POTASSIUM PHOSPHATE .53; .5; .37; .037; .03; .012; .00082 G/100ML; G/100ML; G/100ML; G/100ML; G/100ML; G/100ML; G/100ML
100 INJECTION, SOLUTION INTRAVENOUS CONTINUOUS
Status: CANCELLED | OUTPATIENT
Start: 2024-08-23

## 2024-08-23 RX ORDER — HEPARIN SODIUM 1000 [USP'U]/ML
2000 INJECTION, SOLUTION INTRAVENOUS; SUBCUTANEOUS EVERY 6 HOURS PRN
Status: CANCELLED | OUTPATIENT
Start: 2024-08-23

## 2024-08-23 RX ORDER — CHLORHEXIDINE GLUCONATE ORAL RINSE 1.2 MG/ML
15 SOLUTION DENTAL EVERY 12 HOURS SCHEDULED
Status: DISCONTINUED | OUTPATIENT
Start: 2024-08-23 | End: 2024-09-03 | Stop reason: HOSPADM

## 2024-08-23 RX ORDER — DOCUSATE SODIUM 100 MG/1
100 CAPSULE, LIQUID FILLED ORAL 2 TIMES DAILY
Status: DISCONTINUED | OUTPATIENT
Start: 2024-08-23 | End: 2024-08-27

## 2024-08-23 RX ORDER — INSULIN LISPRO 100 [IU]/ML
2-12 INJECTION, SOLUTION INTRAVENOUS; SUBCUTANEOUS
Status: DISCONTINUED | OUTPATIENT
Start: 2024-08-24 | End: 2024-08-24

## 2024-08-23 RX ORDER — METOPROLOL TARTRATE 25 MG/1
25 TABLET, FILM COATED ORAL EVERY 6 HOURS
Status: CANCELLED | OUTPATIENT
Start: 2024-08-23

## 2024-08-23 RX ORDER — OXYCODONE HYDROCHLORIDE 5 MG/1
5 TABLET ORAL EVERY 4 HOURS PRN
Status: CANCELLED | OUTPATIENT
Start: 2024-08-23

## 2024-08-23 RX ORDER — ALBUMIN, HUMAN INJ 5% 5 %
25 SOLUTION INTRAVENOUS ONCE
Status: COMPLETED | OUTPATIENT
Start: 2024-08-23 | End: 2024-08-23

## 2024-08-23 RX ORDER — ACETAMINOPHEN 10 MG/ML
1000 INJECTION, SOLUTION INTRAVENOUS EVERY 6 HOURS SCHEDULED
Status: DISCONTINUED | OUTPATIENT
Start: 2024-08-23 | End: 2024-08-25

## 2024-08-23 RX ORDER — OXYCODONE HYDROCHLORIDE 5 MG/1
5 TABLET ORAL EVERY 4 HOURS PRN
Status: DISCONTINUED | OUTPATIENT
Start: 2024-08-23 | End: 2024-09-03 | Stop reason: HOSPADM

## 2024-08-23 RX ORDER — ATORVASTATIN CALCIUM 40 MG/1
80 TABLET, FILM COATED ORAL
Status: CANCELLED | OUTPATIENT
Start: 2024-08-24

## 2024-08-23 RX ORDER — CHLORHEXIDINE GLUCONATE ORAL RINSE 1.2 MG/ML
15 SOLUTION DENTAL EVERY 12 HOURS SCHEDULED
Status: DISCONTINUED | OUTPATIENT
Start: 2024-08-23 | End: 2024-08-23 | Stop reason: HOSPADM

## 2024-08-23 RX ORDER — ALBUTEROL SULFATE 5 MG/ML
10 SOLUTION RESPIRATORY (INHALATION) ONCE
Status: DISCONTINUED | OUTPATIENT
Start: 2024-08-23 | End: 2024-08-23

## 2024-08-23 RX ORDER — FUROSEMIDE 10 MG/ML
80 INJECTION INTRAMUSCULAR; INTRAVENOUS ONCE
Status: COMPLETED | OUTPATIENT
Start: 2024-08-23 | End: 2024-08-23

## 2024-08-23 RX ORDER — CALCIUM CARBONATE 500 MG/1
1000 TABLET, CHEWABLE ORAL DAILY PRN
Status: DISCONTINUED | OUTPATIENT
Start: 2024-08-23 | End: 2024-09-03 | Stop reason: HOSPADM

## 2024-08-23 RX ORDER — INSULIN LISPRO 100 [IU]/ML
1-6 INJECTION, SOLUTION INTRAVENOUS; SUBCUTANEOUS
Status: CANCELLED | OUTPATIENT
Start: 2024-08-23

## 2024-08-23 RX ORDER — INSULIN LISPRO 100 [IU]/ML
1-6 INJECTION, SOLUTION INTRAVENOUS; SUBCUTANEOUS
Status: DISCONTINUED | OUTPATIENT
Start: 2024-08-23 | End: 2024-08-23

## 2024-08-23 RX ORDER — OXYCODONE HYDROCHLORIDE 10 MG/1
10 TABLET ORAL EVERY 4 HOURS PRN
Status: DISCONTINUED | OUTPATIENT
Start: 2024-08-23 | End: 2024-09-03 | Stop reason: HOSPADM

## 2024-08-23 RX ORDER — INSULIN LISPRO 100 [IU]/ML
2-12 INJECTION, SOLUTION INTRAVENOUS; SUBCUTANEOUS
Status: DISCONTINUED | OUTPATIENT
Start: 2024-08-23 | End: 2024-08-24

## 2024-08-23 RX ORDER — HYDROMORPHONE HCL/PF 1 MG/ML
0.5 SYRINGE (ML) INJECTION
Status: DISCONTINUED | OUTPATIENT
Start: 2024-08-23 | End: 2024-08-31

## 2024-08-23 RX ORDER — CALCIUM CARBONATE 500 MG/1
1000 TABLET, CHEWABLE ORAL DAILY PRN
Status: CANCELLED | OUTPATIENT
Start: 2024-08-23

## 2024-08-23 RX ORDER — INSULIN LISPRO 100 [IU]/ML
1-6 INJECTION, SOLUTION INTRAVENOUS; SUBCUTANEOUS
Status: DISCONTINUED | OUTPATIENT
Start: 2024-08-24 | End: 2024-08-23

## 2024-08-23 RX ORDER — CHLORHEXIDINE GLUCONATE ORAL RINSE 1.2 MG/ML
15 SOLUTION DENTAL EVERY 12 HOURS SCHEDULED
Status: CANCELLED | OUTPATIENT
Start: 2024-08-23

## 2024-08-23 RX ORDER — COLCHICINE 0.6 MG/1
0.6 TABLET ORAL 2 TIMES DAILY
Status: CANCELLED | OUTPATIENT
Start: 2024-08-23

## 2024-08-23 RX ORDER — SODIUM CHLORIDE, SODIUM GLUCONATE, SODIUM ACETATE, POTASSIUM CHLORIDE, MAGNESIUM CHLORIDE, SODIUM PHOSPHATE, DIBASIC, AND POTASSIUM PHOSPHATE .53; .5; .37; .037; .03; .012; .00082 G/100ML; G/100ML; G/100ML; G/100ML; G/100ML; G/100ML; G/100ML
100 INJECTION, SOLUTION INTRAVENOUS CONTINUOUS
Status: DISCONTINUED | OUTPATIENT
Start: 2024-08-23 | End: 2024-08-23 | Stop reason: HOSPADM

## 2024-08-23 RX ORDER — OXYCODONE HYDROCHLORIDE 10 MG/1
10 TABLET ORAL EVERY 4 HOURS PRN
Status: CANCELLED | OUTPATIENT
Start: 2024-08-23

## 2024-08-23 RX ORDER — HEPARIN SODIUM 1000 [USP'U]/ML
4000 INJECTION, SOLUTION INTRAVENOUS; SUBCUTANEOUS EVERY 6 HOURS PRN
Status: DISCONTINUED | OUTPATIENT
Start: 2024-08-23 | End: 2024-08-27

## 2024-08-23 RX ORDER — METOPROLOL TARTRATE 1 MG/ML
5 INJECTION, SOLUTION INTRAVENOUS EVERY 6 HOURS PRN
Status: CANCELLED | OUTPATIENT
Start: 2024-08-23

## 2024-08-23 RX ADMIN — SENNOSIDES 8.6 MG: 8.6 TABLET, FILM COATED ORAL at 08:16

## 2024-08-23 RX ADMIN — SODIUM BICARBONATE 50 MEQ: 84 INJECTION INTRAVENOUS at 18:00

## 2024-08-23 RX ADMIN — INSULIN HUMAN 10 UNITS: 100 INJECTION, SOLUTION PARENTERAL at 23:14

## 2024-08-23 RX ADMIN — CEFAZOLIN SODIUM 2000 MG: 2 SOLUTION INTRAVENOUS at 08:15

## 2024-08-23 RX ADMIN — CHLORHEXIDINE GLUCONATE 0.12% ORAL RINSE 15 ML: 1.2 LIQUID ORAL at 20:36

## 2024-08-23 RX ADMIN — DOCUSATE SODIUM 100 MG: 100 CAPSULE, LIQUID FILLED ORAL at 08:15

## 2024-08-23 RX ADMIN — INSULIN LISPRO 4 UNITS: 100 INJECTION, SOLUTION INTRAVENOUS; SUBCUTANEOUS at 22:15

## 2024-08-23 RX ADMIN — DOCUSATE SODIUM 100 MG: 100 CAPSULE, LIQUID FILLED ORAL at 20:45

## 2024-08-23 RX ADMIN — HEPARIN SODIUM 2000 UNITS: 1000 INJECTION INTRAVENOUS; SUBCUTANEOUS at 12:49

## 2024-08-23 RX ADMIN — OXYCODONE HYDROCHLORIDE 10 MG: 10 TABLET ORAL at 06:48

## 2024-08-23 RX ADMIN — BUMETANIDE 2 MG: 0.25 INJECTION INTRAMUSCULAR; INTRAVENOUS at 23:19

## 2024-08-23 RX ADMIN — OXYCODONE HYDROCHLORIDE 10 MG: 10 TABLET ORAL at 14:38

## 2024-08-23 RX ADMIN — DEXTROSE MONOHYDRATE 25 ML: 25 INJECTION, SOLUTION INTRAVENOUS at 13:36

## 2024-08-23 RX ADMIN — DEXTROSE MONOHYDRATE 25 ML: 25 INJECTION, SOLUTION INTRAVENOUS at 18:00

## 2024-08-23 RX ADMIN — ACETAMINOPHEN 1000 MG: 10 INJECTION INTRAVENOUS at 20:12

## 2024-08-23 RX ADMIN — ACETAMINOPHEN 1000 MG: 10 INJECTION INTRAVENOUS at 06:34

## 2024-08-23 RX ADMIN — INSULIN HUMAN 5 UNITS: 100 INJECTION, SOLUTION PARENTERAL at 13:40

## 2024-08-23 RX ADMIN — HYDROMORPHONE HYDROCHLORIDE 0.5 MG: 1 INJECTION, SOLUTION INTRAMUSCULAR; INTRAVENOUS; SUBCUTANEOUS at 22:09

## 2024-08-23 RX ADMIN — SODIUM CHLORIDE, SODIUM GLUCONATE, SODIUM ACETATE, POTASSIUM CHLORIDE, MAGNESIUM CHLORIDE, SODIUM PHOSPHATE, DIBASIC, AND POTASSIUM PHOSPHATE 500 ML: .53; .5; .37; .037; .03; .012; .00082 INJECTION, SOLUTION INTRAVENOUS at 16:54

## 2024-08-23 RX ADMIN — NOREPINEPHRINE BITARTRATE 2 MCG/MIN: 1 SOLUTION INTRAVENOUS at 16:44

## 2024-08-23 RX ADMIN — OXYCODONE HYDROCHLORIDE 10 MG: 10 TABLET ORAL at 19:34

## 2024-08-23 RX ADMIN — METOPROLOL TARTRATE 25 MG: 25 TABLET, FILM COATED ORAL at 03:00

## 2024-08-23 RX ADMIN — HYDROMORPHONE HYDROCHLORIDE 0.2 MG: 0.2 INJECTION, SOLUTION INTRAMUSCULAR; INTRAVENOUS; SUBCUTANEOUS at 17:21

## 2024-08-23 RX ADMIN — INSULIN HUMAN 10 UNITS: 100 INJECTION, SOLUTION PARENTERAL at 18:03

## 2024-08-23 RX ADMIN — INSULIN LISPRO 2 UNITS: 100 INJECTION, SOLUTION INTRAVENOUS; SUBCUTANEOUS at 07:36

## 2024-08-23 RX ADMIN — ACETAMINOPHEN 1000 MG: 10 INJECTION INTRAVENOUS at 12:56

## 2024-08-23 RX ADMIN — AMIODARONE HYDROCHLORIDE 150 MG: 50 INJECTION, SOLUTION INTRAVENOUS at 16:49

## 2024-08-23 RX ADMIN — VASOPRESSIN 0.04 UNITS/MIN: 20 INJECTION INTRAVENOUS at 22:15

## 2024-08-23 RX ADMIN — ONDANSETRON 4 MG: 2 INJECTION INTRAMUSCULAR; INTRAVENOUS at 06:38

## 2024-08-23 RX ADMIN — METOROPROLOL TARTRATE 5 MG: 5 INJECTION, SOLUTION INTRAVENOUS at 16:40

## 2024-08-23 RX ADMIN — METOPROLOL TARTRATE 25 MG: 25 TABLET, FILM COATED ORAL at 15:30

## 2024-08-23 RX ADMIN — METHOCARBAMOL 750 MG: 750 TABLET ORAL at 20:44

## 2024-08-23 RX ADMIN — HEPARIN SODIUM 2000 UNITS: 1000 INJECTION INTRAVENOUS; SUBCUTANEOUS at 07:35

## 2024-08-23 RX ADMIN — VANCOMYCIN HYDROCHLORIDE 2000 MG: 10 INJECTION, POWDER, LYOPHILIZED, FOR SOLUTION INTRAVENOUS at 21:06

## 2024-08-23 RX ADMIN — HYDROMORPHONE HYDROCHLORIDE 0.2 MG: 0.2 INJECTION, SOLUTION INTRAMUSCULAR; INTRAVENOUS; SUBCUTANEOUS at 18:18

## 2024-08-23 RX ADMIN — SODIUM CHLORIDE, SODIUM GLUCONATE, SODIUM ACETATE, POTASSIUM CHLORIDE, MAGNESIUM CHLORIDE, SODIUM PHOSPHATE, DIBASIC, AND POTASSIUM PHOSPHATE 100 ML/HR: .53; .5; .37; .037; .03; .012; .00082 INJECTION, SOLUTION INTRAVENOUS at 13:36

## 2024-08-23 RX ADMIN — HEPARIN SODIUM 19 UNITS/KG/HR: 10000 INJECTION, SOLUTION INTRAVENOUS at 20:46

## 2024-08-23 RX ADMIN — HEPARIN SODIUM 15.1 UNITS/KG/HR: 10000 INJECTION, SOLUTION INTRAVENOUS at 06:53

## 2024-08-23 RX ADMIN — AMIODARONE HYDROCHLORIDE 150 MG: 50 INJECTION, SOLUTION INTRAVENOUS at 20:42

## 2024-08-23 RX ADMIN — INSULIN LISPRO 2 UNITS: 100 INJECTION, SOLUTION INTRAVENOUS; SUBCUTANEOUS at 12:49

## 2024-08-23 RX ADMIN — CEFAZOLIN SODIUM 2000 MG: 2 SOLUTION INTRAVENOUS at 00:50

## 2024-08-23 RX ADMIN — CALCIUM GLUCONATE 1 G: 20 INJECTION, SOLUTION INTRAVENOUS at 13:39

## 2024-08-23 RX ADMIN — CALCIUM GLUCONATE 1 G: 20 INJECTION, SOLUTION INTRAVENOUS at 18:00

## 2024-08-23 RX ADMIN — FUROSEMIDE 80 MG: 10 INJECTION, SOLUTION INTRAMUSCULAR; INTRAVENOUS at 14:28

## 2024-08-23 RX ADMIN — SODIUM ZIRCONIUM CYCLOSILICATE 10 G: 10 POWDER, FOR SUSPENSION ORAL at 13:39

## 2024-08-23 RX ADMIN — COLCHICINE 0.6 MG: 0.6 TABLET ORAL at 08:16

## 2024-08-23 RX ADMIN — NOREPINEPHRINE BITARTRATE 5 MCG/MIN: 1 INJECTION, SOLUTION, CONCENTRATE INTRAVENOUS at 19:58

## 2024-08-23 RX ADMIN — OXYCODONE HYDROCHLORIDE 10 MG: 10 TABLET ORAL at 23:09

## 2024-08-23 RX ADMIN — SODIUM CHLORIDE, SODIUM GLUCONATE, SODIUM ACETATE, POTASSIUM CHLORIDE, MAGNESIUM CHLORIDE, SODIUM PHOSPHATE, DIBASIC, AND POTASSIUM PHOSPHATE 100 ML/HR: .53; .5; .37; .037; .03; .012; .00082 INJECTION, SOLUTION INTRAVENOUS at 20:44

## 2024-08-23 RX ADMIN — CEFEPIME 2000 MG: 2 INJECTION, POWDER, FOR SOLUTION INTRAVENOUS at 21:06

## 2024-08-23 RX ADMIN — ALBUMIN (HUMAN) 25 G: 12.5 INJECTION, SOLUTION INTRAVENOUS at 21:18

## 2024-08-23 RX ADMIN — DEXTROSE MONOHYDRATE 25 ML: 25 INJECTION, SOLUTION INTRAVENOUS at 23:14

## 2024-08-23 RX ADMIN — AMIODARONE HYDROCHLORIDE 1 MG/MIN: 50 INJECTION, SOLUTION INTRAVENOUS at 20:46

## 2024-08-23 RX ADMIN — MORPHINE SULFATE 4 MG: 4 INJECTION INTRAVENOUS at 01:42

## 2024-08-23 RX ADMIN — PREDNISONE 30 MG: 10 TABLET ORAL at 21:36

## 2024-08-23 RX ADMIN — COLCHICINE 0.6 MG: 0.6 TABLET ORAL at 20:45

## 2024-08-23 NOTE — PROGRESS NOTES
General Cardiology   Progress Note   Augustus Coffman 68 y.o. male MRN: [id number]  Unit/Bed#: -01 Encounter: [id number]    Assessment/Plan:    Suspected pericarditis  -Given prolonged Staph aureus bacteremia suspect this may be purulent pericarditis  -TTE performed yesterday shows moderate pericardial effusion without findings of tamponade and mitral valve with hyperechoic thickening at the tips consistent with most likely calcification  - Plan to transfer to [facility] for interventional cardiology evaluation for pericardiocentesis   -Continue colchicine    2.  New onset atrial fibrillation with RVR  - Heart rates better controlled with metoprolol tartrate  - Continue heparin drip for anticoagulation  - Continue to monitor heart rate trends on telemetry    3.  MSSA bacteremia with cervical discitis/osteomyelitis  - Further management per infectious disease and primary team    4.  Transaminitis  - Continue to monitor LFTs closely     5.  Hypertension  - Blood pressure overall running on the softer side  - Continue metoprolol tartrate with hold parameters  - Continue to monitor blood pressures closely     6.  Thoracic aortic aneurysm  - Continue with periodic surveillance    7. Aortic stenosis  - Continue periodic outpatient surveillance      Subjective:   Patient seen and examined. No significant events since the last encounter.     REVIEW OF SYSTEMS:  Constitutional:  Denies fever or chills   Eyes:  Denies change in visual acuity   HENT:  Denies nasal congestion or sore throat   Respiratory:  Denies cough, orthopnea, PND or shortness of breath   Cardiovascular:  Denies chest pain, palpitations or edema   GI:  Denies abdominal pain, nausea, vomiting, bloody stools, constipation or diarrhea   :  Denies dysuria, frequency, difficulty in urination or nocturia  Musculoskeletal:  Denies back pain or joint pain   Neurologic:  Denies headache, focal weakness or sensory changes   Endocrine:  Denies polyuria  or polydipsia   Lymphatic:  Denies swollen glands   Psychiatric:  Denies depression or anxiety     Objective:   Vitals:  Vitals:    24 1150   BP: 104/71   Pulse: (!) 106   Resp:    Temp:    SpO2: 96%       Body mass index is 31.65 kg/m².  Systolic (24hrs), Av , Min:95 , Max:128     Diastolic (24hrs), Av, Min:55, Max:78      Intake/Output Summary (Last 24 hours) at 2024 1424  Last data filed at 2024 1336  Gross per 24 hour   Intake 1360 ml   Output --   Net 1360 ml     Weight (last 2 days)       Date/Time Weight    24 1425 118 (260)            Telemetry Review: No significant arrhythmias seen on telemetry review.         PHYSICAL EXAMS  General:  Patient is not in acute distress, laying in the bed comfortably, awake  Head: Normocephalic, Atraumatic.   HEENT: White sclera, pink conjunctiva  Neck:  Supple, no neck vein distention  Respiratory: clear to auscultation   Cardiovascular: S1-S2 normal, no murmurs, thrills, gallops, rubs, regular rhythm  GI:  Abdomen soft, nontender, non-distended  Extremities: No edema, normal pulses  Integument:  No skin rashes or ulceration  Neurologic:  Patient is awake alert, responding to command, oriented to person, place and time    LABORATORY RESULTS:      CBC with diff:   Results from last 7 days   Lab Units 24  0632 24  0550 24  0521 24  1417   WBC Thousand/uL 25.32* 14.77* 13.91* 9.61   HEMOGLOBIN g/dL 11.2* 11.2* 11.7* 10.5*   HEMATOCRIT % 36.3* 36.4* 37.0 33.8*   MCV fL 82 82 81* 82   PLATELETS Thousands/uL 356 303 270 245   RBC Million/uL 4.43 4.42 4.59 4.12   MCH pg 25.3* 25.3* 25.5* 25.5*   MCHC g/dL 30.9* 30.8* 31.6 31.1*   RDW % 16.8* 16.6* 16.5* 16.4*   MPV fL 9.8 10.1 9.9 9.8   NRBC AUTO /100 WBCs 0 0  --  0       CMP:  Results from last 7 days   Lab Units 24  0906 24  0550 24  0521 24  1417   POTASSIUM mmol/L 6.3* 5.0 4.6 5.0   CHLORIDE mmol/L 95* 97 99 97   CO2 mmol/L 23 26 26 30   BUN mg/dL  43* 23 27* 39*   CREATININE mg/dL 2.71* 1.08 1.03 1.36*   CALCIUM mg/dL 10.0 10.0 9.7 10.3*   AST U/L  --  75* 120* 107*   ALT U/L  --  89* 135* 118*   ALK PHOS U/L  --  180* 157* 116*   EGFR ml/min/1.73sq m 23 70 74 53       BMP:  Results from last 7 days   Lab Units 24  0906 24  0550 24  0521   POTASSIUM mmol/L 6.3* 5.0 4.6   CHLORIDE mmol/L 95* 97 99   CO2 mmol/L 23 26 26   BUN mg/dL 43* 23 27*   CREATININE mg/dL 2.71* 1.08 1.03   CALCIUM mg/dL 10.0 10.0 9.7              Results from last 7 days   Lab Units 24  0906 24  0550 24  0521 24  1417   MAGNESIUM mg/dL 2.2 2.2 2.0 2.2             Results from last 7 days   Lab Units 24  0926   INR  1.21*       Lipid Profile:   Lab Results   Component Value Date    CHOL 136 10/06/2015     Lab Results   Component Value Date    HDL 26 (L) 2024    HDL 35 (L) 2024    HDL 29 (L) 2023     Lab Results   Component Value Date    LDLCALC 35 2024    LDLCALC 73 2024    LDLCALC  2023      Comment:      Calculated LDL invalid, triglycerides >400 mg/dl  This screening LDL is a calculated result.   It does not have the accuracy of the Direct Measured LDL in the monitoring of patients with hyperlipidemia and/or statin therapy.   Direct Measure LDL (UXR924) must be ordered separately in these patients.     Lab Results   Component Value Date    TRIG 147 2024    TRIG 245 (H) 2024    TRIG 590 (H) 2023       Cardiac testing:   Results for orders placed during the hospital encounter of 21    Echo complete with contrast if indicated    [facility]  [address]  [place] [address] [phone]    Transthoracic Echocardiogram  2D, M-mode, Doppler, and Color Doppler    Study date:  2021    Patient: CARMELO RHODES  MR number: [id number]  Account number: [id number]  : 1955  Age: 65 years  Gender: Male  Status: Outpatient  Location: [facility]  [facility]  Height: 76 in  Weight: 298.3 lb  BP: 126/ 82 mmHg    Indications: AAA with out rupture..    Diagnoses: I71.2 - Thoracic aortic aneurysm, without rupture    Sonographer:  MARSHALL Pérez  Primary Physician:  Bolivar Castaneda MD  Referring Physician:  Bolivar Castaneda MD  Group:  [facility]  Interpreting Physician:  Eriberto Lopez MD    SUMMARY    LEFT VENTRICLE:  Systolic function was normal. Ejection fraction was estimated to be 60 %.  There were no regional wall motion abnormalities.  Concentric hypertrophy was present.  Doppler parameters were consistent with abnormal left ventricular relaxation (grade 1 diastolic dysfunction).    RIGHT VENTRICLE:  The ventricle was mildly dilated.  Systolic function was normal.    LEFT ATRIUM:  The atrium was mildly dilated.    RIGHT ATRIUM:  The atrium was mildly dilated.    MITRAL VALVE:  There was moderate annular calcification.    TRICUSPID VALVE:  There was trace regurgitation.  Pulmonary artery systolic pressure was within the normal range.    PULMONIC VALVE:  There was trace regurgitation.    AORTA:  The root exhibited upper limit of normal size (3.8 cm).    HISTORY: PRIOR HISTORY: Risk factors: hypertension, oral hypoglycemic-treated diabetes, and medication-treated hypercholesterolemia.    PROCEDURE: The study was performed in the [facility]. This was a routine study. The transthoracic approach was used. The study included complete 2D imaging, M-mode, complete spectral Doppler, and color Doppler. The  heart rate was 66 bpm, at the start of the study. This was a technically difficult study.    LEFT VENTRICLE: Size was normal. Systolic function was normal. Ejection fraction was estimated to be 60 %. There were no regional wall motion abnormalities. Concentric hypertrophy was present. No evidence of apical thrombus. DOPPLER:  Doppler parameters were consistent with abnormal left ventricular relaxation  (grade 1 diastolic dysfunction).    RIGHT VENTRICLE: The ventricle was mildly dilated. Systolic function was normal. Wall thickness was normal.    LEFT ATRIUM: The atrium was mildly dilated.    RIGHT ATRIUM: The atrium was mildly dilated.    MITRAL VALVE: There was moderate annular calcification. There was calcification of the anterior leaflet. There was normal leaflet separation. DOPPLER: The transmitral velocity was within the normal range. There was no evidence for  stenosis. There was no significant regurgitation.    AORTIC VALVE: The valve was not visualized well enough to rule out a bicuspid morphology. Leaflets exhibited mildly to moderately increased thickness and normal cuspal separation. DOPPLER: Transaortic velocity was within the normal range.  There was no evidence for stenosis. There was no significant regurgitation.    TRICUSPID VALVE: The valve structure was normal. There was normal leaflet separation. DOPPLER: The transtricuspid velocity was within the normal range. There was no evidence for stenosis. There was trace regurgitation. Pulmonary artery  systolic pressure was within the normal range.    PULMONIC VALVE: Leaflets exhibited normal thickness, no calcification, and normal cuspal separation. DOPPLER: The transpulmonic velocity was within the normal range. There was trace regurgitation.    PERICARDIUM: There was no pericardial effusion. The pericardium was normal in appearance.    AORTA: The root exhibited upper limit of normal size (3.8 cm).    SYSTEMIC VEINS: IVC: The inferior vena cava was normal in size.    SYSTEM MEASUREMENT TABLES    2D  %FS: 46.54 %  Ao Diam: 3.83 cm  EDV(Teich): 78.78 ml  EF(Teich): 78.29 %  ESV(Teich): 17.11 ml  IVSd: 1.43 cm  LA Area: 21.83 cm2  LA Diam: 4.18 cm  LVEDV MOD A4C: 107.4 ml  LVEF MOD A4C: 48.1 %  LVESV MOD A4C: 55.74 ml  LVIDd: 4.2 cm  LVIDs: 2.25 cm  LVLd A4C: 8.22 cm  LVLs A4C: 7.29 cm  LVOT Diam: 2.72 cm  LVPWd: 1.46 cm  RA Area: 23.57 cm2  RVIDd: 4.33  cm  RWT: 0.7  SV MOD A4C: 51.66 ml  SV(Teich): 61.67 ml    CW  AV Env.Ti: 283.16 ms  AV MaxP.49 mmHg  AV VTI: 50.96 cm  AV Vmax: 2.52 m/s  AV Vmean: 1.8 m/s  AV meanP.64 mmHg  TR MaxP.97 mmHg  TR Vmax: 2.29 m/s    MM  TAPSE: 2.45 cm    PW  IAIN (VTI): 2.39 cm2  IAIN Vmax: 2.26 cm2  AVAI (VTI): 0 cm2/m2  AVAI Vmax: 0 cm2/m2  E': 0.07 m/s  E/E': 11.39  LVOT Env.Ti: 304.47 ms  LVOT VTI: 21 cm  LVOT Vmax: 0.98 m/s  LVOT Vmean: 0.69 m/s  LVOT maxPG: 3.84 mmHg  LVOT meanP.24 mmHg  LVSI Dopp: 46.37 ml/m2  LVSV Dopp: 121.96 ml  MV A [place]: 1.02 m/s  MV Dec [place]: 2.51 m/s2  MV DecT: 309.55 ms  MV E [place]: 0.78 m/s  MV E/A Ratio: 0.76  MV PHT: 89.77 ms  MVA By PHT: 2.45 cm2    [employer] Accredited Echocardiography Laboratory    Prepared and electronically signed by    Eriberto Lopez MD  Signed 2021 15:24:46    No results found for this or any previous visit.    No results found for this or any previous visit.    No valid procedures specified.  No results found for this or any previous visit.      Meds/Allergies   all current active meds have been reviewed    Medications Prior to Admission:     allopurinol (ZYLOPRIM) 300 mg tablet    amLODIPine (NORVASC) 10 mg tablet    aspirin 81 mg chewable tablet    Empagliflozin (Jardiance) 25 MG TABS    hydroCHLOROthiazide 25 mg tablet    losartan (COZAAR) 100 MG tablet    metFORMIN (GLUCOPHAGE) 500 mg tablet    metoprolol succinate (TOPROL-XL) 100 mg 24 hr tablet    rosuvastatin (CRESTOR) 40 MG tablet    tiZANidine (ZANAFLEX) 4 mg tablet    Cholecalciferol 100 MCG (4000 UT) CAPS    cyclobenzaprine (FLEXERIL) 10 mg tablet    Diclofenac Sodium (VOLTAREN) 1 %    Diclofenac Sodium (VOLTAREN) 1 %    Diclofenac Sodium (VOLTAREN) 1 %    ketorolac (TORADOL) 10 mg tablet    lidocaine (Lidoderm) 5 %    lidocaine (LMX) 4 % cream    Multiple Vitamins-Minerals (Centrum Silver 50+Men) TABS    oxyCODONE (Roxicodone) 5 immediate release tablet    heparin (porcine),  3-20 Units/kg/hr (Order-Specific), Last Rate: 19.1 Units/kg/hr (08/23/24 1246)  multi-electrolyte, 100 mL/hr, Last Rate: 100 mL/hr (08/23/24 1336)          ** Please Note: Dragon 360 Dictation voice to text software may have been used in the creation of this document. **

## 2024-08-23 NOTE — PROGRESS NOTES
[facility]  Progress Note  Name: Augustus Coffman I  MRN: [id number]  Unit/Bed#: -01 I Date of Admission: 8/20/2024   Date of Service: 8/23/2024 I Hospital Day: 3    Assessment & Plan   * MSSA bacteremia  Assessment & Plan  Admitted to [facility] in July 2024 for infected diabetic foot ulcer  Neck pain was persistent  Followed up with Ortho and MRI cervical spine was ordered  Discitis/OM at c5/6 with phlegmonous changes  ID recommended long term antibiotics - ID following   Blood cultures MSSA. Repeat Bcx ordered for 8/22 - so far negative   TTE ordered - moderate pericardial effusion discussed with ID and cardiology and the concern is that this could be a purulent effusion from prolonged MSSA bacteremia   Discussed above with cardiology and ID who are recommending transfer to [facility] [place] for pericardiocentesis via interventional cardiology  We also discussed with Thoracic surgery at [facility] [place] - pericardial window would only be in emergent situation such as tamponade    Acute kidney failure (HCC)  Assessment & Plan  Initially Cr downtrended with IVF but back up today   Cr 1.08 > 2.71  Resume IVF  Nephrology consulted   Hyperkalemia treatment (see below)   Repeat BMP in 2 hrs   Patient reported no UOP in the last 24 hrs   Track I/O    Hyperkalemia  Assessment & Plan  K 6.3   In the setting of FINA   Giving IV insulin + dextrose, IV Calcium, Lokelma   Will hold off albuterol in the setting of Afib RVR   Nephrology contacted who will also see the patient   Repeat BMP in 2 hrs     New onset a-fib (HCC)  Assessment & Plan  New onset A-fib RVR with rates up to the 150s this morning  Also had chest pain with this  There was some concern for ST elevations (diffuse) on EKG - d/w cardiology who think likely pericarditis  Trop negative x 3    hep gtt  Converted to NSR and rates became controlled   Continue metop 25 TID   Follow up echo       Transaminitis  Assessment & Plan  Asymptomatic  RUQ u/s WNL  Trend  LFT's    Cervical discitis  Assessment & Plan  Persistent neck pain  MSSA bacteremia  Found to have MRI findings suspicious for discitis osteomyelitis at C5-C6. 3 mm epidural phlegmon/small abscess is also suspected. There is moderate resultant central stenosis and mild mass effect on the cord.   NSGY consulted recommended IV abx. No acute intervention  D/w NSGY regarding intolerance with cervical collar and they said ok to manage without c-collar for now  Pain control                VTE Pharmacologic Prophylaxis: VTE Score: 3 Moderate Risk (Score 3-4) - Pharmacological DVT Prophylaxis Ordered: heparin drip.    Mobility:   Basic Mobility Inpatient Raw Score: 23  JH-HLM Goal: 7: Walk 25 feet or more  JH-HLM Achieved: 6: Walk 10 steps or more  JH-HLM Goal achieved. Continue to encourage appropriate mobility.    Patient Centered Rounds: I performed bedside rounds with nursing staff today.   Discussions with Specialists or Other Care Team Provider: cardiology, infectious disease, nephrology     Education and Discussions with Family / Patient: Updated  (wife) at bedside.    Total Time Spent on Date of Encounter in care of patient: 45 mins. This time was spent on one or more of the following: performing physical exam; counseling and coordination of care; obtaining or reviewing history; documenting in the medical record; reviewing/ordering tests, medications or procedures; communicating with other healthcare professionals and discussing with patient's family/caregivers.    Current Length of Stay: 3 day(s)  Current Patient Status: Inpatient   Certification Statement: The patient will continue to require additional inpatient hospital stay due to transfer pending [facility]  Discharge Plan:  tx [facility] pending bed availability    Code Status: Level 1 - Full Code    Subjective:   This morning the patient reported his pain is much better about a 2 out of 10. No sob reported. Still having episodic sweats. No other pain  reported. Did have a severe episode overnight and resolved after IV pain medications.     Objective:     Vitals:   Temp (24hrs), Av °F (37.2 °C), Min:97.7 °F (36.5 °C), Max:100 °F (37.8 °C)    Temp:  [97.7 °F (36.5 °C)-100 °F (37.8 °C)] 98.9 °F (37.2 °C)  HR:  [] 101  Resp:  [16-20] 16  BP: ()/(55-77) 112/68  SpO2:  [91 %-98 %] 91 %  Body mass index is 31.65 kg/m².     Input and Output Summary (last 24 hours):     Intake/Output Summary (Last 24 hours) at 2024 1440  Last data filed at 2024 1336  Gross per 24 hour   Intake 1360 ml   Output --   Net 1360 ml       Physical Exam:   Physical Exam   NAD  Nonlabored resp on 2L, no wheezing/rhonchi   RRR, tachy  NTND abd without guarding/rebound  aaox3    Additional Data:     Labs:  Results from last 7 days   Lab Units 24  0632   WBC Thousand/uL 25.32*   HEMOGLOBIN g/dL 11.2*   HEMATOCRIT % 36.3*   PLATELETS Thousands/uL 356   SEGS PCT % 88*   LYMPHO PCT % 4*   MONO PCT % 7   EOS PCT % 0     Results from last 7 days   Lab Units 24  0906 24  0550   SODIUM mmol/L 132* 134*   POTASSIUM mmol/L 6.3* 5.0   CHLORIDE mmol/L 95* 97   CO2 mmol/L 23 26   BUN mg/dL 43* 23   CREATININE mg/dL 2.71* 1.08   ANION GAP mmol/L 14* 11   CALCIUM mg/dL 10.0 10.0   ALBUMIN g/dL  --  3.8   TOTAL BILIRUBIN mg/dL  --  0.64   ALK PHOS U/L  --  180*   ALT U/L  --  89*   AST U/L  --  75*   GLUCOSE RANDOM mg/dL 189* 210*     Results from last 7 days   Lab Units 24  0926   INR  1.21*     Results from last 7 days   Lab Units 24  1346 24  1150 24  0723 24  2134 24  1600 24  1134 24  0743 24  2055 24  1610 24  1113 24  0743 24  2118   POC GLUCOSE mg/dl 284* 199* 190* 188* 254* 344* 234* 135 125 164* 169* 163*         Results from last 7 days   Lab Units 24  1417   LACTIC ACID mmol/L 1.7       Lines/Drains:  Invasive Devices       Peripheral Intravenous Line  Duration              Peripheral IV 08/20/24 Left;Proximal;Ventral (anterior) Forearm 3 days    Peripheral IV 08/20/24 Right;Ventral (anterior) Forearm 3 days                      Telemetry:  Telemetry Orders (From admission, onward)               24 Hour Telemetry Monitoring  Continuous x 24 Hours (Telem)        Question:  Reason for 24 Hour Telemetry  Answer:  Arrhythmias requiring acute medical intervention / PPM or ICD malfunction                     Telemetry Reviewed: Normal Sinus Rhythm  Indication for Continued Telemetry Use: Arrthymias requiring medical therapy             Imaging: Reviewed radiology reports from this admission including: ECHO    Recent Cultures (last 7 days):   Results from last 7 days   Lab Units 08/22/24  0606 08/21/24  0103 08/20/24  1432 08/20/24  1430 08/20/24  1417   BLOOD CULTURE  No Growth at 24 hrs.  No Growth at 24 hrs.  --  Staphylococcus aureus* Staphylococcus aureus* Staphylococcus aureus*   GRAM STAIN RESULT   --   --  Gram positive cocci in clusters* Gram positive cocci in clusters* Gram positive cocci in clusters*   URINE CULTURE   --  10,000-19,000 cfu/ml Enterococcus faecalis*  --   --   --        Last 24 Hours Medication List:   Current Facility-Administered Medications   Medication Dose Route Frequency Provider Last Rate    acetaminophen  1,000 mg Intravenous Q6H [facility] Yogesh Stewart MD 1,000 mg (08/23/24 1256)    acetaminophen  650 mg Oral Q6H PRN Yogesh Stewart MD      atorvastatin  80 mg Oral Daily With Dinner Yogesh Stewart MD      calcium carbonate  1,000 mg Oral Daily PRN Yogesh Stewart MD      cefazolin  2,000 mg Intravenous Q8H Ioana Garvin MD 2,000 mg (08/23/24 0815)    colchicine  0.6 mg Oral BID Augustus Garcia PA-C      docusate sodium  100 mg Oral BID Yogesh Stewart MD      heparin (porcine)  3-20 Units/kg/hr (Order-Specific) Intravenous Titrated Augustus Garcia PA-C 19.1 Units/kg/hr (08/23/24 1246)    heparin (porcine)  2,000 Units Intravenous  Q6H PRN Augustus Garcia PA-C      heparin (porcine)  4,000 Units Intravenous Q6H PRN Augustus Garcia PA-C      insulin lispro  1-6 Units Subcutaneous HS Yogesh Stewart MD      insulin lispro  1-6 Units Subcutaneous TID With Meals Yogesh Stewart MD      metoprolol tartrate  25 mg Oral Q6H TANVIR Be      morphine injection  4 mg Intravenous Q4H PRN Bishop Farrar MD      multi-electrolyte  100 mL/hr Intravenous Continuous Bishop Farrar  mL/hr (08/23/24 1336)    ondansetron  4 mg Intravenous Q6H PRN Yogesh Stewart MD      oxyCODONE  10 mg Oral Q4H PRN Bishop Farrar MD      oxyCODONE  5 mg Oral Q4H PRN Bishop Farrar MD      senna  1 tablet Oral Daily Yogesh Stewart MD      Sodium Zirconium Cyclosilicate  10 g Oral Daily Bishop Farrar MD      tiZANidine  4 mg Oral Q8H PRN Bishop Farrar MD          Today, Patient Was Seen By: Bishop Farrar MD    **Please Note: This note may have been constructed using a voice recognition system.**

## 2024-08-23 NOTE — ASSESSMENT & PLAN NOTE
Initially Cr downtrended with IVF but back up today   Cr 1.08 > 2.71  Resume IVF  Nephrology consulted   Hyperkalemia treatment (see below)   Repeat BMP in 2 hrs   Patient reported no UOP in the last 24 hrs   Track I/O

## 2024-08-23 NOTE — PLAN OF CARE
Problem: INFECTION - ADULT  Goal: Absence or prevention of progression during hospitalization  Description: INTERVENTIONS:  - Assess and monitor for signs and symptoms of infection  - Monitor lab/diagnostic results  - Monitor all insertion sites, i.e. indwelling lines, tubes, and drains  - Monitor endotracheal if appropriate and nasal secretions for changes in amount and color  - [place] appropriate cooling/warming therapies per order  - Administer medications as ordered  - Instruct and encourage patient and family to use good hand hygiene technique  - Identify and instruct in appropriate isolation precautions for identified infection/condition  Outcome: Progressing  Goal: Absence of fever/infection during neutropenic period  Description: INTERVENTIONS:  - Monitor WBC    Outcome: Progressing     Problem: PAIN - ADULT  Goal: Verbalizes/displays adequate comfort level or baseline comfort level  Description: Interventions:  - Encourage patient to monitor pain and request assistance  - Assess pain using appropriate pain scale  - Administer analgesics based on type and severity of pain and evaluate response  - Implement non-pharmacological measures as appropriate and evaluate response  - Consider cultural and social influences on pain and pain management  - Notify physician/advanced practitioner if interventions unsuccessful or patient reports new pain  Outcome: Progressing

## 2024-08-23 NOTE — ASSESSMENT & PLAN NOTE
K 6.3   In the setting of FINA   Giving IV insulin + dextrose, IV Calcium, Lokelma   Will hold off albuterol in the setting of Afib RVR   Nephrology contacted who will also see the patient   Repeat BMP in 2 hrs

## 2024-08-23 NOTE — ASSESSMENT & PLAN NOTE
68 YOM with PMHx of lymphoma s/p chemo, DMII presenting to [facility] ED 8/20. Of note, was recently admitted to [facility] in July 2024 with neck pain, found to have MSSA bacteremeia and discitis as cause of neck pain. Planned for 6 weeks of IV ABX therapy, no immediate plan for neurosurgical intervention  Afformentioned MRI with Discitis/OM at c5/6 with phlegmonous changes  Now with new pericardial effusion identified 8/22, no obvious tamponade. Possibly infectious in eitiology    Continue Ancef therapy  ID recommended long term antibiotics - ID following   Blood cultures MSSA. Repeat Bcx ordered for 8/22 - so far negative   TTE ordered - moderate pericardial effusion discussed with ID and cardiology and the concern is that this could be a purulent effusion from prolonged MSSA bacteremia   Discussed above with cardiology and ID who are recommending transfer to [facility] for pericardiocentesis via interventional cardiology  We also discussed with Thoracic surgery at [facility] - pericardial window would only be in emergent situation such as tamponade

## 2024-08-23 NOTE — ASSESSMENT & PLAN NOTE
Continuing to improve  RUQUS obtained previously and negative  Suspect in setting of hypoperfusion given rapid a fib, hypotension  Follow up in AM

## 2024-08-23 NOTE — ASSESSMENT & PLAN NOTE
Admitted to [facility] in July 2024 for infected diabetic foot ulcer  Neck pain was persistent  Followed up with Ortho and MRI cervical spine was ordered  Discitis/OM at c5/6 with phlegmonous changes  ID recommended long term antibiotics - ID following   Blood cultures MSSA. Repeat Bcx ordered for 8/22 - so far negative   TTE ordered - moderate pericardial effusion discussed with ID and cardiology and the concern is that this could be a purulent effusion from prolonged MSSA bacteremia   Discussed above with cardiology and ID who are recommending transfer to [facility] for pericardiocentesis via interventional cardiology  We also discussed with Thoracic surgery at [facility] - pericardial window would only be in emergent situation such as tamponade

## 2024-08-23 NOTE — PLAN OF CARE
Problem: PAIN - ADULT  Goal: Verbalizes/displays adequate comfort level or baseline comfort level  Description: Interventions:  - Encourage patient to monitor pain and request assistance  - Assess pain using appropriate pain scale  - Administer analgesics based on type and severity of pain and evaluate response  - Implement non-pharmacological measures as appropriate and evaluate response  - Consider cultural and social influences on pain and pain management  - Notify physician/advanced practitioner if interventions unsuccessful or patient reports new pain  Outcome: Progressing     Problem: INFECTION - ADULT  Goal: Absence or prevention of progression during hospitalization  Description: INTERVENTIONS:  - Assess and monitor for signs and symptoms of infection  - Monitor lab/diagnostic results  - Monitor all insertion sites, i.e. indwelling lines, tubes, and drains  - Monitor endotracheal if appropriate and nasal secretions for changes in amount and color  - [place] appropriate cooling/warming therapies per order  - Administer medications as ordered  - Instruct and encourage patient and family to use good hand hygiene technique  - Identify and instruct in appropriate isolation precautions for identified infection/condition  Outcome: Progressing  Goal: Absence of fever/infection during neutropenic period  Description: INTERVENTIONS:  - Monitor WBC    Outcome: Progressing

## 2024-08-23 NOTE — TRANSPORTATION MEDICAL NECESSITY
"Section I - General Information    Name of Patient: Augustus Coffman                 : 1955    Medicare #: [id number]  Transport Date: 24 (PCS is valid for round trips on this date and for all repetitive trips in the 60-day range as noted below.)  Origin: [facility] 3RD FLOOR MED SURG UNIT                                                         Destination: [facility]  Is the pt's stay covered under Medicare Part A (PPS/DRG)   [x]     Closest appropriate facility? If no, why is transport to more distant facility required? Yes  If hospice pt, is this transport related to pt's terminal illness? NA       Section II - Medical Necessity Questionnaire  Ambulance transportation is medically necessary only if other means of transport are contraindicated or would be potentially harmful to the patient. To meet this requirement, the patient must either be \"bed confined\" or suffer from a condition such that transport by means other than ambulance is contraindicated by the patient's condition. The following questions must be answered by the medical professional signing below for this form to be valid:    1)  Describe the MEDICAL CONDITION (physical and/or mental) of this patient AT THE TIME OF AMBULANCE TRANSPORT that requires the patient to be transported in an ambulance and why transport by other means is contraindicated by the patient's condition:MSSA Bacteremia with pericardial effusion, concern for purulent pericarditis. Needs Thoracic Surgery    2) Is the patient \"bed confined\" as defined below?     Yes  To be \"be confined\" the patient must satisfy all three of the following conditions: (1) unable to get up from bed without Assistance; AND (2) unable to ambulate; AND (3) unable to sit in a chair or wheelchair.    3) Can this patient safely be transported by car or wheelchair van (i.e., seated during transport without a medical attendant or monitoring)?   No    4) In addition to completing " questions 1-3 above, please check any of the following conditions that apply*:   *Note: supporting documentation for any boxes checked must be maintained in the patient's medical records.  If hosp-hosp transfer, describe services needed at 2nd facility not available at 1st facility?   Moderate/severe pain on movement   IV meds/fluids required   Medical attendant required   Requires oxygen-unable to self administer  Hemodynamic monitoring required en route  Cardiac monitoring required en route       Section III - Signature of Physician or Healthcare Professional  I certify that the above information is true and correct based on my evaluation of this patient, and represent that the patient requires transport by ambulance and that other forms of transport are contraindicated. I understand that this information will be used by the Centers for Medicare and Medicaid Services (CMS) to support the determination of medical necessity for ambulance services, and I represent that I have personal knowledge of the patient's condition at time of transport.    []  If this box is checked, I also certify that the patient is physically or mentally incapable of signing the ambulance service's claim and that the institution with which I am affiliated has furnished care, services, or assistance to the patient.    My signature below is made on behalf of the patient pursuant to 42 CFR §424.36(b)(4). In accordance with 42 CFR §424.37, the specific reason(s) that the patient is physically or mentally incapable of signing the claim form is as follows.      Signature of Physician* or Healthcare Professional______________________________________________________________  Signature Date 08/23/24 (For scheduled repetitive transports, this form is not valid for transports performed more than 60 days after this date)    Printed Name & Credentials of Physician or Healthcare Professional (MD, , RN, etc.)__Sherri Mehta RN_____  *Form must be  signed by patient's attending physician for scheduled, repetitive transports. For non-repetitive, unscheduled ambulance transports, if unable to obtain the signature of the attending physician, any of the following may sign (choose appropriate option below)  [] Physician Assistant []  Clinical Nurse Specialist [x]  Registered Nurse  []  Nurse Practitioner  [x] Discharge Planner

## 2024-08-23 NOTE — QUICK NOTE
ECHO from today showed new moderate pericardial effusion, not present on ECHO from late July.  Given prolonged S. aureus bacteremia with acute onset of chest pain, pericarditis and moderate pericardial effusion, we need to consider purulent pericarditis.  Patient will need to be transferred to [place] for interventional cardiology evaluation for pericardiocentesis to assess for possible purulent pericarditis.  ECHO also showed echodensity on mitral valve, not definitive for vegetation.  Patient will also likely need LUCRECIA.    I discussed with Dr. Farrar from primary service earlier.  I discussed with Dr. Pringle from cardiology earlier also.  I discussed with Dr. Plasencia from thoracic surgery earlier also. She felt that at this point, there is no indication for pericardial window unless pericardiocentesis findings are consistent with purulent pericarditis.

## 2024-08-23 NOTE — CASE MANAGEMENT
Case Management Progress Note    Patient name Augustus Coffman  Location /-01 MRN [id number]  : 1955 Date 2024       LOS (days): 3  Geometric Mean LOS (GMLOS) (days): 2.9  Days to GMLOS:0.2        OBJECTIVE:        Current admission status: Inpatient  Preferred Pharmacy:   [id number]  [place] - [address]  [address] [address]  Phone: [phone] Fax: [phone]    [id number] - [facility] [place] - [address]  [address] [place] [address]  Phone: [phone] Fax: [phone]    Primary Care Provider: Gwen Lazo DO    Primary Insurance: MEDICARE  Secondary Insurance: [facility]    PROGRESS NOTE:  Discussed patient's case in interdisciplinary rounds this morning with [facility] provider. Patient is pending transfer to [facility] for thoracic surgery due to pericardial effusion, likely purulent per recent echocardiogram. Facesheet, medical necessity, and acute transfer report printed and placed in patient binder. Per PACS flowsheet, still pending bed assignment at this time. CM will continue to follow for needs.

## 2024-08-23 NOTE — CONSULTS
NEPHROLOGY CONSULTATION NOTE    Patient: Augustus Coffman               Sex: male          DOA: 8/20/2024  1:35 PM   YOB: 1955        Age:  68 y.o.        LOS:  LOS: 3 days     REFERRING PHYSICIAN: Dr. Farrar     REASON FOR THE REFERRAL / CONSULTATION: Acute kidney injury    DATE OF CONSULTATION / SERVICE: 8/23/2024    ADMISSION DIAGNOSIS: MSSA bacteremia     CHIEF COMPLAINT     Neck pain    HPI     This is a 68 years old male with past medical history of diabetes mellitus type 2, prior history of FINA, follicular lymphoma, nephrolithiasis, recent episode of MSSA bacteremia who presented to our facility on August 28 due to abnormal MRI cervical spine results.  Patient had presented to our facility in July 2024 with diabetic foot wound and was found to have MSSA bacteremia.  He was treated with 7 days of IV cefazolin and discharged.  Patient had complained of cervical spine pain during that hospitalization however CT scan was negative.  Outpatient MRI scan of C-spine revealed discitis/osteomyelitis with abscess.  He presented to our facility on August 28, 2024 for treatment.  His repeat blood cultures here grew MSSA right away.  Patient had been kept on high-dose IV cefazolin.  For the past 24 hours patient developed atrial fibrillation with RVR and was initiated on heparin gtt.  A echocardiogram revealed pericardial effusion with presence of pericarditis.  Patient was initiated on colchicine.  Labs obtained today revealed FINA and hyperkalemia with a creatinine of 2.7 mg/dL and a potassium of 6.3.  Patient reports being an uric for the past 24 hours.  Currently patient is in the process of being transferred to [facility] for probable pericardial window/pericardiocentesis.  Complains of shortness of breath.         PAST MEDICAL HISTORY     Past Medical History:   Diagnosis Date    Arthritis 2010's    Occasionally flares up    Cancer (HCC) May 2021    Still investigating    Chronic  kidney disease     Diabetes mellitus (HCC)     Follicular lymphoma (HCC)     GERD (gastroesophageal reflux disease) June 2021    Noticed in an Endoscopy    Heart murmur May 2020    High cholesterol     Hypertension     Kidney stone 1980's    Have had since 1980's    Obesity Since Childhood       PAST SURGICAL HISTORY     Past Surgical History:   Procedure Laterality Date    BUNIONECTOMY Right 11/24/2020    Procedure: RIGHT HAV CORRECTION,;  Surgeon: Niranjan Child DPM;  [facility];  Service: Podiatry    COLONOSCOPY      INCISION AND DRAINAGE OF WOUND Right 10/05/2016    Procedure: INCISION AND DRAINAGE (I&D) EXTREMITY;  Surgeon: Niranjan Child DPM;  Location[facility];  Service:     IR BIOPSY LYMPH NODE  07/08/2021    IR EMBOLIZATION (SPECIFY VESSEL OR SITE)  07/08/2021    IR PORT PLACEMENT  9/1/2022    PILONIDAL CYST EXCISION      KY CORRECTION HAMMERTOE Right 02/19/2019    Procedure: THIRD HAMMER TOE CORRECTION;  Surgeon: Niranjan Child DPM;  [facility];  Service: Podiatry    KY RMVL MATEO CTR VAD W/SUBQ PORT/ CTR/PRPH INSJ N/A 3/14/2023    Procedure: REMOVAL VENOUS PORT (PORT-A-CATH)IR;  Surgeon: Avelino Vázquez DO;  [facility];  Service: Interventional Radiology    TOE OSTEOTOMY Right 03/14/2017    Procedure: HAMMERTOE CORRECTION R 2 ;  Surgeon: Niranjan Child DPM;  [facility];  Service:     TOE OSTEOTOMY Left 11/24/2020    Procedure: LEFT HT CORRECTION TOE;  Surgeon: Niranjan Child DPM;  [facility];  Service: Podiatry    TONSILLECTOMY  1963       ALLERGIES     Allergies   Allergen Reactions    Lisinopril Cough       SOCIAL HISTORY     Social History     Substance and Sexual Activity   Alcohol Use Never     Social History     Substance and Sexual Activity   Drug Use Never     Social History     Tobacco Use   Smoking Status Never   Smokeless Tobacco Never   Tobacco Comments    Never smoked but exposed to second hand smoke from birth until 1980's       FAMILY  HISTORY     Family History   Problem Relation Age of Onset    Cancer Father         Leukemia       CURRENT MEDICATIONS       Current Facility-Administered Medications:     acetaminophen (Ofirmev) injection 1,000 mg, 1,000 mg, Intravenous, Q6H SRIDHAR, Yogesh Stewart MD, Last Rate: 400 mL/hr at 08/23/24 1256, 1,000 mg at 08/23/24 1256    acetaminophen (TYLENOL) tablet 650 mg, 650 mg, Oral, Q6H PRN, Yogesh Stewart MD    atorvastatin (LIPITOR) tablet 80 mg, 80 mg, Oral, Daily With Dinner, Yogesh Stewart MD, 80 mg at 08/22/24 1628    calcium carbonate (TUMS) chewable tablet 1,000 mg, 1,000 mg, Oral, Daily PRN, Yogesh Stewart MD    ceFAZolin (ANCEF) IVPB (premix in dextrose) 2,000 mg 50 mL, 2,000 mg, Intravenous, Q8H, Ioana Garvin MD, Last Rate: 100 mL/hr at 08/23/24 0815, 2,000 mg at 08/23/24 0815    colchicine (COLCRYS) tablet 0.6 mg, 0.6 mg, Oral, BID, Augustus Garcia PA-C, 0.6 mg at 08/23/24 0816    docusate sodium (COLACE) capsule 100 mg, 100 mg, Oral, BID, Yogesh Stewart MD, 100 mg at 08/23/24 0815    heparin (porcine) 25,000 units in 0.45% NaCl 250 mL infusion (premix), 3-20 Units/kg/hr (Order-Specific), Intravenous, Titrated, Augustus Garcia PA-C, Last Rate: 17.2 mL/hr at 08/23/24 1246, 19.1 Units/kg/hr at 08/23/24 1246    heparin (porcine) injection 2,000 Units, 2,000 Units, Intravenous, Q6H PRN, Augustus Garcia PA-C, 2,000 Units at 08/23/24 1249    heparin (porcine) injection 4,000 Units, 4,000 Units, Intravenous, Q6H PRN, Augustus Garcia PA-C    insulin lispro (HumALOG/ADMELOG) 100 units/mL subcutaneous injection 1-6 Units, 1-6 Units, Subcutaneous, HS, Yogesh Stewart MD, 1 Units at 08/22/24 7324    insulin lispro (HumALOG/ADMELOG) 100 units/mL subcutaneous injection 1-6 Units, 1-6 Units, Subcutaneous, TID With Meals, 2 Units at 08/23/24 1249 **AND** Fingerstick Glucose (POCT), , , 4x Daily AC and at bedtime, Yogesh Stewart MD    metoprolol tartrate (LOPRESSOR)  tablet 25 mg, 25 mg, Oral, Q6H, TANVIR Be, 25 mg at 08/23/24 0300    morphine injection 4 mg, 4 mg, Intravenous, Q4H PRN, Bishop Farrar MD, 4 mg at 08/23/24 0142    multi-electrolyte (PLASMALYTE-A/ISOLYTE-S PH 7.4) IV solution, 100 mL/hr, Intravenous, Continuous, Bishop Farrar MD, Last Rate: 100 mL/hr at 08/23/24 1336, 100 mL/hr at 08/23/24 1336    ondansetron (ZOFRAN) injection 4 mg, 4 mg, Intravenous, Q6H PRN, Yogesh Stewart MD, 4 mg at 08/23/24 0638    oxyCODONE (ROXICODONE) immediate release tablet 10 mg, 10 mg, Oral, Q4H PRN, Bishop Farrar MD, 10 mg at 08/23/24 0648    oxyCODONE (ROXICODONE) IR tablet 5 mg, 5 mg, Oral, Q4H PRN, Bishop Farrar MD    senna (SENOKOT) tablet 8.6 mg, 1 tablet, Oral, Daily, Yogesh Stewart MD, 8.6 mg at 08/23/24 0816    Sodium Zirconium Cyclosilicate (Lokelma) 10 g, 10 g, Oral, Daily, Bishop Farrar MD, 10 g at 08/23/24 1339    tiZANidine (ZANAFLEX) tablet 4 mg, 4 mg, Oral, Q8H PRN, Bishop Farrar MD    REVIEW OF SYSTEMS     Review of Systems   Constitutional:  Positive for diaphoresis and fatigue.   HENT: Negative.     Eyes: Negative.    Respiratory:  Positive for shortness of breath.    Cardiovascular: Negative.    Gastrointestinal: Negative.    Endocrine: Negative.    Genitourinary:  Positive for decreased urine volume.   Musculoskeletal: Negative.    Skin: Negative.    Allergic/Immunologic: Negative.    Neurological: Negative.    Hematological: Negative.    All other systems reviewed and are negative.        OBJECTIVE     Current Weight: Weight - Scale: 118 kg (260 lb)  Vitals:    08/23/24 1150   BP: 104/71   Pulse: (!) 106   Resp:    Temp:    SpO2: 96%     Body mass index is 31.65 kg/m².    Intake/Output Summary (Last 24 hours) at 8/23/2024 1432  Last data filed at 8/23/2024 1336  Gross per 24 hour   Intake 1360 ml   Output --   Net 1360 ml       PHYSICAL EXAMINATION     Physical Exam  Constitutional:       Appearance: He is ill-appearing.   HENT:      Head: Normocephalic and  atraumatic.   Eyes:      Pupils: Pupils are equal, round, and reactive to light.   Neck:      Vascular: No JVD.   Cardiovascular:      Rate and Rhythm: Normal rate and regular rhythm.      Heart sounds: Murmur heard.      No friction rub.   Pulmonary:      Effort: Pulmonary effort is normal.      Breath sounds: Normal breath sounds.   Abdominal:      General: Bowel sounds are normal. There is no distension.      Palpations: Abdomen is soft.      Tenderness: There is no abdominal tenderness. There is no rebound.   Musculoskeletal:         General: No tenderness or edema.      Cervical back: Neck supple.   Skin:     General: Skin is dry.      Findings: No rash.   Neurological:      Mental Status: He is alert and oriented to person, place, and time.   Psychiatric:         Mood and Affect: Mood and affect normal.           LAB RESULTS        Results from last 7 days   Lab Units 08/23/24  0906 08/23/24  0632 08/22/24  0550 08/21/24  0521 08/20/24  1417   WBC Thousand/uL  --  25.32* 14.77* 13.91* 9.61   HEMOGLOBIN g/dL  --  11.2* 11.2* 11.7* 10.5*   HEMATOCRIT %  --  36.3* 36.4* 37.0 33.8*   PLATELETS Thousands/uL  --  356 303 270 245   POTASSIUM mmol/L 6.3*  --  5.0 4.6 5.0   CHLORIDE mmol/L 95*  --  97 99 97   CO2 mmol/L 23  --  26 26 30   BUN mg/dL 43*  --  23 27* 39*   CREATININE mg/dL 2.71*  --  1.08 1.03 1.36*   EGFR ml/min/1.73sq m 23  --  70 74 53   CALCIUM mg/dL 10.0  --  10.0 9.7 10.3*   MAGNESIUM mg/dL 2.2  --  2.2 2.0 2.2       I have personally reviewed the old medical records and patient's previously known baseline creatinine level is ~1.1    RADIOLOGY RESULTS       IMPRESSION:     No acute cardiopulmonary disease.       PLAN / RECOMMENDATIONS      68 years old male with past medical history of follicular lymphoma, diabetes mellitus type 2, diabetic foot ulcer, MSSA infection who presents to our facility with abnormal MRI of C-spine revealing discitis and found to have recurrent MSSA sepsis.    1.  Acute  kidney injury: Occurred during hospitalization.  Noted to have a serum creatinine of 1.08 mg/dL yesterday  Noted today to have a creatinine of 2.7 mg/dL in the setting of anuria.  Patient is currently receiving IV fluids and will administer 1 dose of IV Lasix 80 mg to induce diuresis and kaliuresis.  Repeat BMP planned at 4 PM and if hyperkalemia persists, may consider emergent IHD in ICU.  Etiology of FINA is likely septic ATN plus impaired renal perfusion in setting of new onset A-fib with RVR plus pericarditis with pericardial effusion further compromising renal blood flow.    2.  Hyperkalemia: Potassium of 6.3 mEq/L noted likely due to being an uric.  Attempt to induce urine flow by administering fluids/IV Lasix for purpose of kaliuresis.  Repeat potassium at 4 PM and if hyperkalemia persist, plan IHD.    3.  Sepsis: Cultures grew MSSA which were positive in July 2024 as well as a result of diabetic foot ulcer.  Cultures grew MSSA here with likely deposition in cervical spine causing discitis/osteomyelitis.  Remains on high-dose IV cefazolin at present time    4.  Pericarditis: Noted to have chest pain with shortness of breath yesterday with EKG revealing ST elevation with negative troponin suggestive of pericarditis.  Noted to have moderate pericardial effusion for which patient may require pericardiocentesis.    5.  Hypertension: Blood pressure medications are on hold given low normal blood pressure.  Home medication include hydrochlorothiazide, Jardiance and losartan.    Thank you for the consultation to participate in patient's care. I have personally discussed my plan with the referring physician.     Shahana Flores MD    8/23/2024

## 2024-08-23 NOTE — DISCHARGE SUMMARY
"  Discharge Summary - Augustus Coffman 68 y.o. male MRN: [id number]    Unit/Bed#: ICU 06 Encounter: [id number]    Admission Date:   Admission Orders (From admission, onward)       Ordered        08/20/24 1714  INPATIENT ADMISSION  Once                            Admitting Diagnosis: Cervical discitis [M46.42]  Abnormal laboratory test [R89.9]    HPI: Per H&P 8/20\" 68 y.o. male with a PMH of diabetic foot wound, diabetes, htn who presents with abnormal MRI cervical spine. Patient was admitted to [facility] for diabetic foot wound in July. At the time patient was reporting neck pain which was suspected to be secondary to musculoskeletal pain. He had follow up with orthopedic surgery as an outpatient and MRI cervical spine was ordered which showed osteomyelitis/diskitis. He was referred to the ED and admitted to [facility] for further management. \"    Procedures Performed:   Orders Placed This Encounter   Procedures    Critical Care    POC Cardiac US       Summary of Hospital Course: Patient admitted as above.  Patient with known MSSA bacteremia.  During patient's evaluation and treatment on the medical surgical unit, patient was unable to tolerate his cervical collar.  Neurosurgery was contacted by [facility], and suggested that the patient can go without his collar for now.  Patient without neurological deficits.  On 8/22 patient developed new onset A-fib, and placed on therapeutic anticoagulation with heparin and beta-blockade was continued.  Cardiology was consulted.  Patient with TTE with findings consistent for moderate pericardial effusion.  Cardiology suggested transfer to [facility] for thoracic surgery evaluation and concern for purulent pericarditis.  Slim discussed same with thoracic surgery at [facility].  Patient was excepted.  While awaiting transfer, patient became tachycardic and BP soft.  Patient with mild hyperkalemia and acute kidney injury.  Central and arterial lines placed.  Hyperkalemia treatment " initiated prior to transport via helicopter.  Patient accepted by Dr. Sylvain Torres.        Significant Findings, Care, Treatment and Services Provided:   Acute kidney injury, hyperkalemia-nephrology consulted  Elevated lactic acid 2.5  MSSA bacteremia-infectious disease consulted  Discitis and osteomyelitis [id number] consulted  Atrial fibrillation, pericardial effusion-cardiology consulted  Foot wound-wound care consulted    Complications: Pericardial effusion    Discharge Diagnosis: Pericardial effusion    Medical Problems       Resolved Problems  Date Reviewed: 8/12/2024   None         Condition at Discharge: critical         Discharge instructions/Information to patient and family:   See after visit summary for information provided to patient and family.      Provisions for Follow-Up Care:  See after visit summary for information related to follow-up care and any pertinent home health orders.      PCP: Gwen Lazo,     Disposition:  [facility]    Planned Readmission: Yes under critical care with thoracic surgery consulted      Discharge Statement   I spent 30 minutes discharging the patient. This time was spent on the day of discharge. I had direct contact with the patient on the day of discharge. Additional documentation is required if more than 30 minutes were spent on discharge.     Discharge Medications:  See after visit summary for reconciled discharge medications provided to patient and family.

## 2024-08-23 NOTE — ASSESSMENT & PLAN NOTE
With hospitalization at the end of July 2024 for persistent neck pain  Noted to have MSSA bacteremia that hospitalization  MRI-Found to have findings suspicious for discitis osteomyelitis at C5-C6. 3 mm epidural phlegmon/small abscess is also suspected. There is moderate resultant central stenosis and mild mass effect on the cord.   Previous NSGY consult with recommendation IV abx, no further interventions for now  Previous intolerance to cervical collar, per NSGY ok to manage without c-collar for now

## 2024-08-23 NOTE — ASSESSMENT & PLAN NOTE
Persistent neck pain  MSSA bacteremia  Found to have MRI findings suspicious for discitis osteomyelitis at C5-C6. 3 mm epidural phlegmon/small abscess is also suspected. There is moderate resultant central stenosis and mild mass effect on the cord.   NSGY consulted recommended IV abx. No acute intervention  D/w NSGY regarding intolerance with cervical collar and they said ok to manage without c-collar for now  Pain control

## 2024-08-23 NOTE — ASSESSMENT & PLAN NOTE
Baseline Cr 09.-1.0  Cr 1.08 > 2.71  Suspect in setting of potential hypoperfusion due to pericardial effusion  POCUS pending  Nephrology consulted   Hyperkalemia treatment:  Insulin, D50, Lasix 80, Lokelma  Q6H BMP starting at 1600  Consented for RRT, temp line placement  Strict I and O- consider avilez  Avoid nephrotoxic agents

## 2024-08-23 NOTE — ASSESSMENT & PLAN NOTE
Lab Results   Component Value Date    HGBA1C 6.8 (H) 07/05/2024       Recent Labs     08/23/24  0723 08/23/24  1150 08/23/24  1346 08/23/24  1536   POCGLU 190* 199* 284* 217*       Blood Sugar Average: Last 72 hrs:  (P) [id number]      Current regimen of SSI alg 3 QID  Continue diabetic diet for now  QID accuchecks  Goal -180

## 2024-08-23 NOTE — QUICK NOTE
Notified patient reporting persistent chest pain, but has increased in intensity and left side 7 out of 10 currently.  Vital signs stable.  Patient given 4 mg IV morphine, 2 L nasal cannula.  Repeat troponin now still negative.  EKG still showing profuse ST elevations as previous, with some change in ST elevations in leads I and II.  On recheck patient already reporting chest pain is subsiding quickly and is mild and has never really gone away today.  Patient being worked up for pericarditis, continue treatment.

## 2024-08-23 NOTE — PROGRESS NOTES
Progress Note - Infectious Disease   Augustus Coffman 68 y.o. male MRN: [id number]  Unit/Bed#: -01 Encounter: [id number]      Impression/Plan:  Discitis/osteomyelitis of C5-C6 with associated 3mm epidural phlegmon/small abscess. Continued with neck pain post-discharge and followed-up with orthopedics in the outpatient. Demonstrated on MRI from 8/20. Likely in setting of #2 and #3. ESR (75) and CRP (221) both elevated. Per neurosurgery, no indication for surgical intervention at this time given patient does not have any neurologic deficits. Patient will need a prolonged IV antibiotic course for osteomyelitis. Blood cultures on admission also positive for MSSA. Course complicated by #4. Now with uptrending leukocytosis.   -Continue IV cefazolin 2g q8h  -Appreciate neurosurgery recommendations  -Will need prolonged IV antibiotic course for at least 6 weeks  -Will need repeat MRI prior to stopping antibiotics to ensure resolution of epidural abscess before completing antibiotic course  -Continue to trend weekly ESR/CRP  -Continue to follow CBCD and BMP with AM labs to monitor for potential antimicrobial toxicities and assess for clinical response to antibiotics.      MSSA bacteremia. Blood cultures positive 2 out of 2 for MSSA on 7/28 during last admission. Patient was treated with IV cefazolin for 7d course during last admission. Repeat blood cultures were not checked.TTE obtained at that time did not demonstrate evidence of valvular vegetation. Repeat blood cultures now during this admission also positive for MSSA. No known indwelling hardware other than in his foot. Course now complicated by development of pericarditis as in #4. TTE obtained demonstrated a new moderate effusion as well. Given duration of bacteremia, new acute onset chest pain, and new moderate effusion, concern is high for purulent pericarditis. TTE was limited to assess for endocarditis, however, concern for endocarditis is high. Patient  pending transfer to \[facility]\"" as below. Would also obtain a LUCRECIA to assess for endocarditis.  -Continue antibiotics as above  -Obtain a LUCRECIA  -Appreciate cardiology recommendations  -Pending transfer to \[facility]\"" as below  -Follow-up repeat blood cultures obtained this 8/22; currently NGTD x 24h  -Blood cultures need to be negative for at least 72h prior to PICC line placement     Right diabetic foot wound. Recent admission form 7/28-8/3 for sepsis 2/2 to right diabetic foot wound. Wound cultures positive for MSSA at that time. Likely source for #2 which led to development of #3. Now nearly completely healed.   -Continue local wound care     Pericarditis. Developed chest pain and diaphoresis on 8/22. EKG demonstrated diffuse ST elevation, concerning for pericarditis. Cardiology was consulted. Troponins negative. Patient underwent a TTE which demonstrated a new moderate pericardial effusion. Given prolonged MSSA bacteremia, with acute onset of chest pain and moderate pericardial effusion, we need to consider purulent pericarditis. Patient needs transfer to \[facility]\"" for interventional cardiology evaluation.   -Continue cefazolin as above  -Recommend transfer to \[facility]\"" for interventional cardiology evaluation and pericardiocentesis  -Continue colchcine per cardiology recommendations     T2DM. Most recent A1C 6.8. Remains a risk factor for development of infection and poor wound healing.   -Continue with tight glycemic control.      6. FINA on CKD. Baseline appears to be between 0.9-1.1. Elevated to 1.36 on admission. Likely prerenal. Improved.   -Avoid nephrotoxins  -Will dose adjust antibiotics as needed  -Continue to trend serum creatinine.      7. Transaminitis. LFTs elevated on admission (AST//118). Alk phos also elevated. RUQ U/S did not demonstrate any acute changes. Demonstrated gallstones with gallbladder sludge without evidence of acute cholecystitis. Improved.   -Continue to monitor LFTs     Above plan was discussed in  detail with patient at bedside.   Above plan was discussed in detail with primary service attending, who agrees with the plan to continue cefazolin, transfer to [facility], follow-up blood cultures, obtain LUCRECIA.     ID consult service will continue to follow closely over the weekend if patient remains at [facility]. ID to be consulted when patient is transferred to [facility].     Antibiotics:  cefazolin    Subjective:  Patient states the chest pain has improved, but is still present. Was diaphoretic and clammy this morning, but improved now. Reports ongoing pain when taking a deep breath, but was also just given IV pain meds which have helped. No nausea or vomiting currently. No abdominal pain, diarrhea, or dysuria. Tolerating IV antibiotic without difficulty. Reports potential improvement in neck pain.     Objective:  Vitals:  Temp:  [97.7 °F (36.5 °C)-100 °F (37.8 °C)] 98.9 °F (37.2 °C)  HR:  [] 106  Resp:  [16-20] 16  BP: ()/(55-78) 104/71  SpO2:  [93 %-98 %] 96 %  Temp (24hrs), Av.9 °F (37.2 °C), Min:97.7 °F (36.5 °C), Max:100 °F (37.8 °C)  Current: Temperature: 98.9 °F (37.2 °C)    Physical Exam:   General Appearance:  Appears ill, pale, currently in no acute distress.   Throat: Oropharynx moist without lesions.    Lungs:   Decreased breath sounds, respirations unlabored, on 2L NC.    Heart:  Tachycardic, +murmur   Abdomen:   Soft, non-tender, non-distended, positive bowel sounds.     Extremities: No clubbing or cyanosis, no edema.   Skin: No new rashes, lesions, or draining wounds noted on exposed skin.     Labs, Imaging, & Other studies:   All pertinent labs and imaging studies were personally reviewed  Results from last 7 days   Lab Units 24  0632 24  0550 24  0521   WBC Thousand/uL 25.32* 14.77* 13.91*   HEMOGLOBIN g/dL 11.2* 11.2* 11.7*   PLATELETS Thousands/uL 356 303 270     Results from last 7 days   Lab Units 24  0550 24  0521 24  1417   POTASSIUM mmol/L 5.0 4.6 5.0    CHLORIDE mmol/L 97 99 97   CO2 mmol/L 26 26 30   BUN mg/dL 23 27* 39*   CREATININE mg/dL 1.08 1.03 1.36*   EGFR ml/min/1.73sq m 70 74 53   CALCIUM mg/dL 10.0 9.7 10.3*   AST U/L 75* 120* 107*   ALT U/L 89* 135* 118*   ALK PHOS U/L 180* 157* 116*     Results from last 7 days   Lab Units 08/22/24  0606 08/21/24  0103 08/20/24  1432 08/20/24  1430 08/20/24  1417   BLOOD CULTURE  No Growth at 24 hrs.  No Growth at 24 hrs.  --  Staphylococcus aureus* Staphylococcus aureus* Staphylococcus aureus*   GRAM STAIN RESULT   --   --  Gram positive cocci in clusters* Gram positive cocci in clusters* Gram positive cocci in clusters*   URINE CULTURE   --  10,000-19,000 cfu/ml Enterococcus faecalis*  --   --   --          Results from last 7 days   Lab Units 08/20/24  1417   CRP mg/L 221.0*                 Imaging Studies:  I have personally reviewed pertinent imaging study reports and images in PACS.       Carolyn Goode PA-C  [facility]

## 2024-08-23 NOTE — ASSESSMENT & PLAN NOTE
New onset A-fib RVR with rates up to the 150s this morning  Also had chest pain with this  There was some concern for ST elevations (diffuse) on EKG - d/w cardiology who think likely pericarditis  Trop negative x 3    hep gtt  Converted to NSR and rates became controlled   Continue metop 25 TID   Follow up echo

## 2024-08-23 NOTE — PROCEDURES
Central Line Insertion    Date/Time: 8/23/2024 6:04 PM    Performed by: Isael Cortes MD  Authorized by: Isael Cortes MD    Other Assisting Provider: Yes (comment) (Isael Mazariegos, Colten Lyles)    Consent:     Consent obtained:  Verbal and emergent situation    Consent given by:  Patient    Risks discussed:  Arterial puncture, bleeding, incorrect placement, infection and pneumothorax    Alternatives discussed:  No treatment  Universal protocol:     Patient identity confirmed:  Verbally with patient  Pre-procedure details:     Hand hygiene: Hand hygiene performed prior to insertion      Sterile barrier technique: All elements of maximal sterile technique followed      Skin preparation:  ChloraPrep    Skin preparation agent: Skin preparation agent completely dried prior to procedure    Anesthesia (see MAR for exact dosages):     Anesthesia method:  Local infiltration    Local anesthetic:  Lidocaine 1% w/o epi  Procedure details:     Location:  Left internal jugular    Vessel type: vein      Laterality:  Left    Approach: percutaneous endoscopic technique used      Patient position: upright, unable to tolerate laying flat in setting of AHRF.    Catheter type:  Triple lumen 20cm    Catheter size:  7 Fr    Landmarks identified: yes      Ultrasound guidance: yes      Ultrasound image availability:  Images available in PACS    Sterile ultrasound techniques: Sterile gel and sterile probe covers were used      Number of attempts:  2    Successful placement: yes      Catheter tip vessel location: superior vena cava      Catheter tip vessel location comment:  Tip terminating in the upper SVC near the brachiocephalic junction  Post-procedure details:     Post-procedure:  Dressing applied and line sutured    Assessment:  Blood return through all ports, placement verified by x-ray, no pneumothorax on x-ray and free fluid flow    Post-procedure complications: none      Patient tolerance of procedure:   Tolerated well, no immediate complications    Observer: Yes      Observer name:  Sara RN  Comments:      Unable to tolerate laying flat in setting of pericardial effusion, AHRF, hypervolemia. Attempted upright. Verified placement with US, CXR. Tolerated procedure well    TANVIR Gillis

## 2024-08-23 NOTE — PROCEDURES
POC Cardiac US    Date/Time: 8/23/2024 4:34 PM    Performed by: TANVIR Gillis  Authorized by: TANVIR Gillis    Patient location:  ICU  Procedure details:     Exam Type:  Diagnostic    Indications: hypotension, dyspnea and respiratory distress      Assessment / Evaluation for: cardiac function and pericardial effusion      Exam Type: follow-up exam      Image quality: limited diagnostic      Image availability:  Images available in PACS and video obtained  Patient Details:     Cardiac Rhythm:  Irregular    Mechanical ventilation: No    Cardiac findings:     Echo technique: limited 2D and M-mode      Views obtained: parasternal long axis, subcostal and apical      Pericardial effusion: large      Tamponade physiology: absent      Wall motion: hypodynamic      LV systolic function: normal      RV dilation: mild    Pulmonary findings:     B-lines: 1 to 3    IVC findings:     IVC Size: dilated      Maximum IVC Diameter (cm):  2.3  Interpretation:     Fluid Status:  Hypervolemic    TANVIR Gillis

## 2024-08-23 NOTE — PROCEDURES
Arterial Line Insertion    Date/Time: 8/23/2024 4:33 PM    Performed by: TANVIR Gillis  Authorized by: TANVIR Gillis    Patient location:  ICU  Other Assisting Provider: No    Consent:     Consent obtained:  Verbal    Consent given by:  Patient    Risks discussed:  Bleeding, infection, pain, ischemia and repeat procedure  Universal protocol:     Patient identity confirmed:  Verbally with patient  Indications:     Indications: hemodynamic monitoring, multiple ABGs and continuous blood pressure monitoring    Pre-procedure details:     Preparation: Patient was prepped and draped in sterile fashion    Anesthesia (see MAR for exact dosages):     Anesthesia method:  None  Procedure details:     Location / Tip of Catheter:  Radial    Laterality:  Left    Placement technique:  Ultrasound guided    Ultrasound image availability:  Not saved    Sterile ultrasound techniques: Sterile gel and sterile probe covers were used      Number of attempts:  1    Successful placement: yes      Transducer: waveform confirmed    Post-procedure details:     Post-procedure:  Secured with tape, sterile dressing applied, sutured and wrist guard applied    Patient tolerance of procedure:  Tolerated well, no immediate complications      TANVIR Gillis

## 2024-08-24 ENCOUNTER — APPOINTMENT (INPATIENT)
Dept: NON INVASIVE DIAGNOSTICS | Facility: HOSPITAL | Age: 69
DRG: 853 | End: 2024-08-24
Payer: MEDICARE

## 2024-08-24 PROBLEM — M46.22 ACUTE OSTEOMYELITIS OF CERVICAL SPINE (HCC): Status: ACTIVE | Noted: 2024-08-20

## 2024-08-24 LAB
ALBUMIN SERPL BCG-MCNC: 3.3 G/DL (ref 3.5–5)
ALP SERPL-CCNC: 179 U/L (ref 34–104)
ALT SERPL W P-5'-P-CCNC: 418 U/L (ref 7–52)
ANION GAP SERPL CALCULATED.3IONS-SCNC: 14 MMOL/L (ref 4–13)
ANION GAP SERPL CALCULATED.3IONS-SCNC: 15 MMOL/L (ref 4–13)
ANION GAP SERPL CALCULATED.3IONS-SCNC: 15 MMOL/L (ref 4–13)
AORTIC ROOT: 3.6 CM
APICAL FOUR CHAMBER EJECTION FRACTION: 49 %
APTT PPP: 62 SECONDS (ref 23–34)
AST SERPL W P-5'-P-CCNC: 2452 U/L (ref 13–39)
BASE EX.OXY STD BLDV CALC-SCNC: 59.9 % (ref 60–80)
BASE EXCESS BLDV CALC-SCNC: -3.4 MMOL/L
BASOPHILS # BLD AUTO: 0.06 THOUSANDS/ÂΜL (ref 0–0.1)
BASOPHILS NFR BLD AUTO: 0 % (ref 0–1)
BILIRUB DIRECT SERPL-MCNC: 0.46 MG/DL (ref 0–0.2)
BILIRUB SERPL-MCNC: 0.71 MG/DL (ref 0.2–1)
BSA FOR ECHO PROCEDURE: 2.5 M2
BUN SERPL-MCNC: 52 MG/DL (ref 5–25)
BUN SERPL-MCNC: 54 MG/DL (ref 5–25)
BUN SERPL-MCNC: 55 MG/DL (ref 5–25)
BUN SERPL-MCNC: 55 MG/DL (ref 5–25)
BUN SERPL-MCNC: 57 MG/DL (ref 5–25)
CALCIUM SERPL-MCNC: 8.8 MG/DL (ref 8.4–10.2)
CALCIUM SERPL-MCNC: 9.2 MG/DL (ref 8.4–10.2)
CALCIUM SERPL-MCNC: 9.2 MG/DL (ref 8.4–10.2)
CALCIUM SERPL-MCNC: 9.4 MG/DL (ref 8.4–10.2)
CALCIUM SERPL-MCNC: 9.4 MG/DL (ref 8.4–10.2)
CHLORIDE SERPL-SCNC: 93 MMOL/L (ref 96–108)
CHLORIDE SERPL-SCNC: 93 MMOL/L (ref 96–108)
CHLORIDE SERPL-SCNC: 94 MMOL/L (ref 96–108)
CHLORIDE SERPL-SCNC: 94 MMOL/L (ref 96–108)
CHLORIDE SERPL-SCNC: 96 MMOL/L (ref 96–108)
CK SERPL-CCNC: 22 U/L (ref 39–308)
CO2 SERPL-SCNC: 22 MMOL/L (ref 21–32)
CO2 SERPL-SCNC: 23 MMOL/L (ref 21–32)
CO2 SERPL-SCNC: 23 MMOL/L (ref 21–32)
CO2 SERPL-SCNC: 24 MMOL/L (ref 21–32)
CO2 SERPL-SCNC: 25 MMOL/L (ref 21–32)
CREAT SERPL-MCNC: 3.04 MG/DL (ref 0.6–1.3)
CREAT SERPL-MCNC: 3.06 MG/DL (ref 0.6–1.3)
CREAT SERPL-MCNC: 3.12 MG/DL (ref 0.6–1.3)
CREAT SERPL-MCNC: 3.33 MG/DL (ref 0.6–1.3)
CREAT SERPL-MCNC: 3.38 MG/DL (ref 0.6–1.3)
EOSINOPHIL # BLD AUTO: 0 THOUSAND/ÂΜL (ref 0–0.61)
EOSINOPHIL NFR BLD AUTO: 0 % (ref 0–6)
ERYTHROCYTE [DISTWIDTH] IN BLOOD BY AUTOMATED COUNT: 16.9 % (ref 11.6–15.1)
FRACTIONAL SHORTENING: 14 (ref 28–44)
GFR SERPL CREATININE-BSD FRML MDRD: 17 ML/MIN/1.73SQ M
GFR SERPL CREATININE-BSD FRML MDRD: 18 ML/MIN/1.73SQ M
GFR SERPL CREATININE-BSD FRML MDRD: 19 ML/MIN/1.73SQ M
GFR SERPL CREATININE-BSD FRML MDRD: 20 ML/MIN/1.73SQ M
GFR SERPL CREATININE-BSD FRML MDRD: 20 ML/MIN/1.73SQ M
GLUCOSE SERPL-MCNC: 113 MG/DL (ref 65–140)
GLUCOSE SERPL-MCNC: 115 MG/DL (ref 65–140)
GLUCOSE SERPL-MCNC: 123 MG/DL (ref 65–140)
GLUCOSE SERPL-MCNC: 125 MG/DL (ref 65–140)
GLUCOSE SERPL-MCNC: 130 MG/DL (ref 65–140)
GLUCOSE SERPL-MCNC: 158 MG/DL (ref 65–140)
GLUCOSE SERPL-MCNC: 161 MG/DL (ref 65–140)
GLUCOSE SERPL-MCNC: 170 MG/DL (ref 65–140)
GLUCOSE SERPL-MCNC: 196 MG/DL (ref 65–140)
GLUCOSE SERPL-MCNC: 201 MG/DL (ref 65–140)
GLUCOSE SERPL-MCNC: 213 MG/DL (ref 65–140)
GLUCOSE SERPL-MCNC: 224 MG/DL (ref 65–140)
GLUCOSE SERPL-MCNC: 231 MG/DL (ref 65–140)
GLUCOSE SERPL-MCNC: 236 MG/DL (ref 65–140)
GLUCOSE SERPL-MCNC: 239 MG/DL (ref 65–140)
GLUCOSE SERPL-MCNC: 307 MG/DL (ref 65–140)
GLUCOSE SERPL-MCNC: 314 MG/DL (ref 65–140)
HCO3 BLDV-SCNC: 24.6 MMOL/L (ref 24–30)
HCT VFR BLD AUTO: 34.2 % (ref 36.5–49.3)
HGB BLD-MCNC: 10.4 G/DL (ref 12–17)
IMM GRANULOCYTES # BLD AUTO: 0.45 THOUSAND/UL (ref 0–0.2)
IMM GRANULOCYTES NFR BLD AUTO: 2 % (ref 0–2)
INTERVENTRICULAR SEPTUM IN DIASTOLE (PARASTERNAL SHORT AXIS VIEW): 1.8 CM
INTERVENTRICULAR SEPTUM: 1.8 CM (ref 0.6–1.1)
LACTATE SERPL-SCNC: 1.9 MMOL/L (ref 0.5–2)
LEFT ATRIUM SIZE: 4.7 CM
LEFT INTERNAL DIMENSION IN SYSTOLE: 3.2 CM (ref 2.1–4)
LEFT VENTRICULAR INTERNAL DIMENSION IN DIASTOLE: 3.7 CM (ref 3.5–6)
LEFT VENTRICULAR POSTERIOR WALL IN END DIASTOLE: 1.7 CM
LEFT VENTRICULAR STROKE VOLUME: 19 ML
LVSV (TEICH): 19 ML
LYMPHOCYTES # BLD AUTO: 0.62 THOUSANDS/ÂΜL (ref 0.6–4.47)
LYMPHOCYTES NFR BLD AUTO: 3 % (ref 14–44)
MAGNESIUM SERPL-MCNC: 2.3 MG/DL (ref 1.9–2.7)
MAGNESIUM SERPL-MCNC: 2.4 MG/DL (ref 1.9–2.7)
MAGNESIUM SERPL-MCNC: 2.5 MG/DL (ref 1.9–2.7)
MCH RBC QN AUTO: 25.2 PG (ref 26.8–34.3)
MCHC RBC AUTO-ENTMCNC: 30.4 G/DL (ref 31.4–37.4)
MCV RBC AUTO: 83 FL (ref 82–98)
MONOCYTES # BLD AUTO: 1.33 THOUSAND/ÂΜL (ref 0.17–1.22)
MONOCYTES NFR BLD AUTO: 6 % (ref 4–12)
NASAL CANNULA: 6
NEUTROPHILS # BLD AUTO: 19.74 THOUSANDS/ÂΜL (ref 1.85–7.62)
NEUTS SEG NFR BLD AUTO: 89 % (ref 43–75)
NRBC BLD AUTO-RTO: 0 /100 WBCS
O2 CT BLDV-SCNC: 9.5 ML/DL
PCO2 BLDV: 59.2 MM HG (ref 42–50)
PH BLDV: 7.24 [PH] (ref 7.3–7.4)
PHOSPHATE SERPL-MCNC: 7.4 MG/DL (ref 2.3–4.1)
PHOSPHATE SERPL-MCNC: 7.7 MG/DL (ref 2.3–4.1)
PHOSPHATE SERPL-MCNC: 8.1 MG/DL (ref 2.3–4.1)
PLATELET # BLD AUTO: 383 THOUSANDS/UL (ref 149–390)
PMV BLD AUTO: 9.9 FL (ref 8.9–12.7)
PO2 BLDV: 39.9 MM HG (ref 35–45)
POTASSIUM SERPL-SCNC: 5 MMOL/L (ref 3.5–5.3)
POTASSIUM SERPL-SCNC: 5.3 MMOL/L (ref 3.5–5.3)
POTASSIUM SERPL-SCNC: 5.5 MMOL/L (ref 3.5–5.3)
POTASSIUM SERPL-SCNC: 6 MMOL/L (ref 3.5–5.3)
POTASSIUM SERPL-SCNC: 6.1 MMOL/L (ref 3.5–5.3)
PROT SERPL-MCNC: 6.9 G/DL (ref 6.4–8.4)
RBC # BLD AUTO: 4.12 MILLION/UL (ref 3.88–5.62)
RIGHT VENTRICLE ID DIMENSION: 3.9 CM
SL CV ECHO PERICARDIAL EFFUSION SIZE: 1.4 CM
SL CV LV EF: 45
SL CV PED ECHO LEFT VENTRICLE DIASTOLIC VOLUME (MOD BIPLANE) 2D: 60 ML
SL CV PED ECHO LEFT VENTRICLE SYSTOLIC VOLUME (MOD BIPLANE) 2D: 41 ML
SODIUM SERPL-SCNC: 130 MMOL/L (ref 135–147)
SODIUM SERPL-SCNC: 131 MMOL/L (ref 135–147)
SODIUM SERPL-SCNC: 132 MMOL/L (ref 135–147)
SODIUM SERPL-SCNC: 133 MMOL/L (ref 135–147)
SODIUM SERPL-SCNC: 133 MMOL/L (ref 135–147)
VANCOMYCIN SERPL-MCNC: 20.8 UG/ML (ref 10–20)
WBC # BLD AUTO: 22.2 THOUSAND/UL (ref 4.31–10.16)

## 2024-08-24 PROCEDURE — 85730 THROMBOPLASTIN TIME PARTIAL: CPT | Performed by: STUDENT IN AN ORGANIZED HEALTH CARE EDUCATION/TRAINING PROGRAM

## 2024-08-24 PROCEDURE — 93321 DOPPLER ECHO F-UP/LMTD STD: CPT

## 2024-08-24 PROCEDURE — 93308 TTE F-UP OR LMTD: CPT

## 2024-08-24 PROCEDURE — 99223 1ST HOSP IP/OBS HIGH 75: CPT | Performed by: THORACIC SURGERY (CARDIOTHORACIC VASCULAR SURGERY)

## 2024-08-24 PROCEDURE — 94760 N-INVAS EAR/PLS OXIMETRY 1: CPT

## 2024-08-24 PROCEDURE — 93005 ELECTROCARDIOGRAM TRACING: CPT

## 2024-08-24 PROCEDURE — 83605 ASSAY OF LACTIC ACID: CPT | Performed by: PHYSICIAN ASSISTANT

## 2024-08-24 PROCEDURE — 99222 1ST HOSP IP/OBS MODERATE 55: CPT | Performed by: INTERNAL MEDICINE

## 2024-08-24 PROCEDURE — 80048 BASIC METABOLIC PNL TOTAL CA: CPT | Performed by: PHYSICIAN ASSISTANT

## 2024-08-24 PROCEDURE — 84100 ASSAY OF PHOSPHORUS: CPT | Performed by: PHYSICIAN ASSISTANT

## 2024-08-24 PROCEDURE — 85025 COMPLETE CBC W/AUTO DIFF WBC: CPT | Performed by: PHYSICIAN ASSISTANT

## 2024-08-24 PROCEDURE — 82550 ASSAY OF CK (CPK): CPT | Performed by: PHYSICIAN ASSISTANT

## 2024-08-24 PROCEDURE — 99233 SBSQ HOSP IP/OBS HIGH 50: CPT | Performed by: INTERNAL MEDICINE

## 2024-08-24 PROCEDURE — 99233 SBSQ HOSP IP/OBS HIGH 50: CPT | Performed by: STUDENT IN AN ORGANIZED HEALTH CARE EDUCATION/TRAINING PROGRAM

## 2024-08-24 PROCEDURE — 93321 DOPPLER ECHO F-UP/LMTD STD: CPT | Performed by: INTERNAL MEDICINE

## 2024-08-24 PROCEDURE — 94640 AIRWAY INHALATION TREATMENT: CPT

## 2024-08-24 PROCEDURE — 82948 REAGENT STRIP/BLOOD GLUCOSE: CPT

## 2024-08-24 PROCEDURE — 83735 ASSAY OF MAGNESIUM: CPT | Performed by: PHYSICIAN ASSISTANT

## 2024-08-24 PROCEDURE — 93325 DOPPLER ECHO COLOR FLOW MAPG: CPT

## 2024-08-24 PROCEDURE — 80076 HEPATIC FUNCTION PANEL: CPT | Performed by: PHYSICIAN ASSISTANT

## 2024-08-24 PROCEDURE — 99223 1ST HOSP IP/OBS HIGH 75: CPT | Performed by: INTERNAL MEDICINE

## 2024-08-24 PROCEDURE — 99223 1ST HOSP IP/OBS HIGH 75: CPT | Performed by: PHYSICIAN ASSISTANT

## 2024-08-24 PROCEDURE — 93308 TTE F-UP OR LMTD: CPT | Performed by: INTERNAL MEDICINE

## 2024-08-24 PROCEDURE — 80202 ASSAY OF VANCOMYCIN: CPT | Performed by: PHYSICIAN ASSISTANT

## 2024-08-24 PROCEDURE — 82805 BLOOD GASES W/O2 SATURATION: CPT | Performed by: PHYSICIAN ASSISTANT

## 2024-08-24 PROCEDURE — NC001 PR NO CHARGE: Performed by: INTERNAL MEDICINE

## 2024-08-24 PROCEDURE — 93325 DOPPLER ECHO COLOR FLOW MAPG: CPT | Performed by: INTERNAL MEDICINE

## 2024-08-24 RX ORDER — SODIUM CHLORIDE FOR INHALATION 0.9 %
VIAL, NEBULIZER (ML) INHALATION
Status: COMPLETED
Start: 2024-08-24 | End: 2024-08-24

## 2024-08-24 RX ORDER — CEFAZOLIN SODIUM 2 G/50ML
2000 SOLUTION INTRAVENOUS EVERY 6 HOURS
Status: DISCONTINUED | OUTPATIENT
Start: 2024-08-24 | End: 2024-08-30

## 2024-08-24 RX ORDER — FUROSEMIDE 10 MG/ML
120 INJECTION INTRAMUSCULAR; INTRAVENOUS ONCE
Status: COMPLETED | OUTPATIENT
Start: 2024-08-24 | End: 2024-08-24

## 2024-08-24 RX ORDER — FUROSEMIDE 10 MG/ML
80 INJECTION INTRAMUSCULAR; INTRAVENOUS ONCE
Status: DISCONTINUED | OUTPATIENT
Start: 2024-08-24 | End: 2024-08-24

## 2024-08-24 RX ORDER — DEXTROSE MONOHYDRATE 25 G/50ML
25 INJECTION, SOLUTION INTRAVENOUS ONCE
Status: COMPLETED | OUTPATIENT
Start: 2024-08-24 | End: 2024-08-24

## 2024-08-24 RX ORDER — SODIUM CHLORIDE 9 MG/ML
100 INJECTION, SOLUTION INTRAVENOUS CONTINUOUS
Status: DISCONTINUED | OUTPATIENT
Start: 2024-08-24 | End: 2024-08-24

## 2024-08-24 RX ORDER — BUMETANIDE 0.25 MG/ML
1 INJECTION INTRAMUSCULAR; INTRAVENOUS CONTINUOUS
Status: DISCONTINUED | OUTPATIENT
Start: 2024-08-24 | End: 2024-08-25

## 2024-08-24 RX ORDER — POLYETHYLENE GLYCOL 3350 17 G/17G
17 POWDER, FOR SOLUTION ORAL DAILY
Status: DISCONTINUED | OUTPATIENT
Start: 2024-08-24 | End: 2024-09-03 | Stop reason: HOSPADM

## 2024-08-24 RX ORDER — METOLAZONE 10 MG/1
10 TABLET ORAL ONCE
Status: COMPLETED | OUTPATIENT
Start: 2024-08-24 | End: 2024-08-24

## 2024-08-24 RX ORDER — ALBUTEROL SULFATE 5 MG/ML
10 SOLUTION RESPIRATORY (INHALATION) ONCE
Status: COMPLETED | OUTPATIENT
Start: 2024-08-24 | End: 2024-08-24

## 2024-08-24 RX ADMIN — METHOCARBAMOL 750 MG: 750 TABLET ORAL at 21:06

## 2024-08-24 RX ADMIN — SODIUM ZIRCONIUM CYCLOSILICATE 10 G: 10 POWDER, FOR SUSPENSION ORAL at 09:01

## 2024-08-24 RX ADMIN — METHOCARBAMOL 750 MG: 750 TABLET ORAL at 02:33

## 2024-08-24 RX ADMIN — VASOPRESSIN 0.04 UNITS/MIN: 20 INJECTION INTRAVENOUS at 04:00

## 2024-08-24 RX ADMIN — INSULIN HUMAN 10 UNITS: 100 INJECTION, SOLUTION PARENTERAL at 07:28

## 2024-08-24 RX ADMIN — HYDROMORPHONE HYDROCHLORIDE 0.5 MG: 1 INJECTION, SOLUTION INTRAMUSCULAR; INTRAVENOUS; SUBCUTANEOUS at 08:17

## 2024-08-24 RX ADMIN — SODIUM ZIRCONIUM CYCLOSILICATE 10 G: 10 POWDER, FOR SUSPENSION ORAL at 15:01

## 2024-08-24 RX ADMIN — VASOPRESSIN 0.04 UNITS/MIN: 20 INJECTION INTRAVENOUS at 12:41

## 2024-08-24 RX ADMIN — NOREPINEPHRINE BITARTRATE 11 MCG/MIN: 1 INJECTION, SOLUTION, CONCENTRATE INTRAVENOUS at 01:49

## 2024-08-24 RX ADMIN — Medication 2 MG/HR: at 20:38

## 2024-08-24 RX ADMIN — SODIUM BICARBONATE 50 MEQ: 84 INJECTION INTRAVENOUS at 03:16

## 2024-08-24 RX ADMIN — POLYETHYLENE GLYCOL 3350 17 G: 17 POWDER, FOR SOLUTION ORAL at 08:25

## 2024-08-24 RX ADMIN — DOCUSATE SODIUM 100 MG: 100 CAPSULE, LIQUID FILLED ORAL at 08:24

## 2024-08-24 RX ADMIN — ALBUTEROL SULFATE 10 MG: 2.5 SOLUTION RESPIRATORY (INHALATION) at 03:30

## 2024-08-24 RX ADMIN — CEFAZOLIN SODIUM 2000 MG: 2 SOLUTION INTRAVENOUS at 12:38

## 2024-08-24 RX ADMIN — OXYCODONE HYDROCHLORIDE 10 MG: 10 TABLET ORAL at 14:54

## 2024-08-24 RX ADMIN — Medication 2 MG/HR: at 10:53

## 2024-08-24 RX ADMIN — DEXTROSE MONOHYDRATE 25 ML: 25 INJECTION, SOLUTION INTRAVENOUS at 03:28

## 2024-08-24 RX ADMIN — Medication 2 MG/HR: at 14:10

## 2024-08-24 RX ADMIN — AMIODARONE HYDROCHLORIDE 1 MG/MIN: 50 INJECTION, SOLUTION INTRAVENOUS at 12:17

## 2024-08-24 RX ADMIN — CEFEPIME 2000 MG: 2 INJECTION, POWDER, FOR SOLUTION INTRAVENOUS at 09:00

## 2024-08-24 RX ADMIN — ACETAMINOPHEN 1000 MG: 10 INJECTION INTRAVENOUS at 02:34

## 2024-08-24 RX ADMIN — INSULIN HUMAN 10 UNITS: 100 INJECTION, SOLUTION PARENTERAL at 03:28

## 2024-08-24 RX ADMIN — INSULIN LISPRO 4 UNITS: 100 INJECTION, SOLUTION INTRAVENOUS; SUBCUTANEOUS at 06:34

## 2024-08-24 RX ADMIN — ACETAMINOPHEN 1000 MG: 10 INJECTION INTRAVENOUS at 13:48

## 2024-08-24 RX ADMIN — METHOCARBAMOL 750 MG: 750 TABLET ORAL at 14:12

## 2024-08-24 RX ADMIN — SODIUM ZIRCONIUM CYCLOSILICATE 10 G: 10 POWDER, FOR SUSPENSION ORAL at 00:51

## 2024-08-24 RX ADMIN — COLCHICINE 0.6 MG: 0.6 TABLET ORAL at 08:21

## 2024-08-24 RX ADMIN — HYDROMORPHONE HYDROCHLORIDE 0.5 MG: 1 INJECTION, SOLUTION INTRAMUSCULAR; INTRAVENOUS; SUBCUTANEOUS at 13:01

## 2024-08-24 RX ADMIN — SODIUM ZIRCONIUM CYCLOSILICATE 10 G: 10 POWDER, FOR SUSPENSION ORAL at 21:06

## 2024-08-24 RX ADMIN — METOLAZONE 10 MG: 10 TABLET ORAL at 11:22

## 2024-08-24 RX ADMIN — SENNOSIDES 8.6 MG: 8.6 TABLET, FILM COATED ORAL at 08:24

## 2024-08-24 RX ADMIN — SODIUM CHLORIDE 8 UNITS/HR: 9 INJECTION, SOLUTION INTRAVENOUS at 07:50

## 2024-08-24 RX ADMIN — ISODIUM CHLORIDE 3 ML: 0.03 SOLUTION RESPIRATORY (INHALATION) at 03:30

## 2024-08-24 RX ADMIN — ACETAMINOPHEN 1000 MG: 10 INJECTION INTRAVENOUS at 08:06

## 2024-08-24 RX ADMIN — METHOCARBAMOL 750 MG: 750 TABLET ORAL at 08:21

## 2024-08-24 RX ADMIN — HYDROMORPHONE HYDROCHLORIDE 0.5 MG: 1 INJECTION, SOLUTION INTRAMUSCULAR; INTRAVENOUS; SUBCUTANEOUS at 01:09

## 2024-08-24 RX ADMIN — CHLORHEXIDINE GLUCONATE 0.12% ORAL RINSE 15 ML: 1.2 LIQUID ORAL at 08:17

## 2024-08-24 RX ADMIN — PREDNISONE 30 MG: 10 TABLET ORAL at 08:21

## 2024-08-24 RX ADMIN — HEPARIN SODIUM 19 UNITS/KG/HR: 10000 INJECTION, SOLUTION INTRAVENOUS at 13:48

## 2024-08-24 RX ADMIN — CHLORHEXIDINE GLUCONATE 0.12% ORAL RINSE 15 ML: 1.2 LIQUID ORAL at 21:06

## 2024-08-24 RX ADMIN — CEFAZOLIN SODIUM 2000 MG: 2 SOLUTION INTRAVENOUS at 17:52

## 2024-08-24 RX ADMIN — FUROSEMIDE 120 MG: 10 INJECTION, SOLUTION INTRAMUSCULAR; INTRAVENOUS at 07:27

## 2024-08-24 RX ADMIN — DOCUSATE SODIUM 100 MG: 100 CAPSULE, LIQUID FILLED ORAL at 17:52

## 2024-08-24 RX ADMIN — OXYCODONE HYDROCHLORIDE 10 MG: 10 TABLET ORAL at 06:34

## 2024-08-24 RX ADMIN — DEXTROSE MONOHYDRATE 25 ML: 25 INJECTION, SOLUTION INTRAVENOUS at 07:28

## 2024-08-24 RX ADMIN — SODIUM CHLORIDE 100 ML/HR: 0.9 INJECTION, SOLUTION INTRAVENOUS at 07:43

## 2024-08-24 RX ADMIN — COLCHICINE 0.6 MG: 0.6 TABLET ORAL at 17:52

## 2024-08-24 RX ADMIN — ACETAMINOPHEN 1000 MG: 10 INJECTION INTRAVENOUS at 20:59

## 2024-08-24 NOTE — CONSULTS
[facility]  Consult  Name: Augustus Coffman 68 y.o. 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  Patient ultimately admitted at the end of July with sepsis and neck pain.  Patient was seen by orthopedics for evaluation of pain who ordered an outpatient MRI cervical spine demonstrating concern for osteitis discitis and patient was directed to the emergency department.  He was originally admitted to [facility] and unfortunately developed progressive sepsis, chest pain and diagnosed with pericardial effusion.  Patient was transferred to [facility] for surgical evaluations.    Imaging personally reviewed:  MRI cervical spine without 8/20/24: Mild degenerative changes C3-C6 with evidence of STIR signal abnormalities, endplate edema and mild disc edema at C5-C6 most consistent with osteomyelitis discitis especially in the setting of prior bacteremia.  There is also a small epidural collection phlegmon versus abscess resulting in moderate central stenosis without cord compression.  No cord signal abnormality.    Plan:   Continue monitor neurological exam.  At this time patient is without any radicular or myelopathic complaints or exam findings.  MRI results reviewed in detail with the patient and his family present at bedside.  Given nonfocal exam as well as no radicular or myelopathic complaints, no neurosurgical intervention anticipated at this juncture.  Will obtain baseline upright x-rays to monitor spinal alignment  and for any pathologic fractures.  Discussed with patient general use of collar with cervical osteomyelitis/discitis but he was unable to tolerate this prior to transfer.  We can defer at this time but discussed limited neck movements.  If any changes noted on upright x-rays we will rediscuss need for collar.  Will defer additional MRI imaging at this time unless patient develops any exam changes or neurological complaints.  Primary team may consider repeat if concerns for source control or worsening cervical infection.  Ongoing antibiotics per infectious disease.  Cefazolin 2 g IV every 6 hours.   Pain control per primary team.  Patient does state current pain regimen provides good relief.  Patient may mobilize with physical and Occupational Therapy.  DVT prophylaxis: Bilateral SCDs.  Heparin gtt.   Ongoing medical management per primary team and subspecialties.    Neurosurgery review upright x-rays once completed.  Call in the interim with any questions or concerns.             History of Present Illness     HPI: Augustus Coffman is a 68 y.o. male with PMH including type 2 diabetes, diabetic foot wound, hypertension who as a transfer from [facility] for surgical evaluation of osteomyelitis discitis and pericardial effusion.  Patient states approximately 7 weeks of neck/shoulder pain originally starting on the right then transferred to the left.  He was seen in the emergency room multiple times.  He was admitted at the end of July with sepsis and discharged in the beginning of August.  Patient with MSSA bacteremia with a source felt to be his right foot diabetic ulcer.  After discharge he was seen by orthopedics who ordered an outpatient MRI cervical spine that demonstrated C5-6/minus discitis and patient was directed to the emergency room for admission.  He was admitted to [facility] on August 20 and has been managed at that campus.  Unfortunately he developed new onset chest pain and atrial  fibrillation as well as concerns for pericarditis and pericardial effusion without tamponade.  Patient was transferred to [facility] for ongoing care and surgical evaluation.  In addition he developed new acute kidney injury with hyperkalemia.  He had further atrial fibrillation with RVR and associated hypotension.  Patient is currently being managed in the ICU.    Given worsening sepsis and ongoing neck pain with MRI cervical spine demonstrating C5-6 osteomyelitis discitis as well as small epidural phlegmon versus abscess, neurosurgical evaluation was requested.  At this time, patient continues with neck pain which she states does improve with his current pain regimen.  Neck pain becomes severe as his pain medications wear off.  He denies any radicular pain, numbness, tingling and/or weakness.  He denies any clumsiness in his hands or difficulty feeding himself.  Villa in place.      Review of Systems   Constitutional:  Positive for activity change, chills and fever.   Respiratory:  Positive for chest tightness and shortness of breath.    Genitourinary:         Villa in place   Musculoskeletal:  Positive for neck pain.   Neurological:  Negative for weakness, numbness and headaches.   Psychiatric/Behavioral:  Negative for confusion.        Historical Information   Past Medical History:   Diagnosis Date    Arthritis 2010's    Occasionally flares up    Cancer (HCC) May 2021    Still investigating    Chronic kidney disease     Diabetes mellitus (HCC)     Follicular lymphoma (HCC)     GERD (gastroesophageal reflux disease) June 2021    Noticed in an Endoscopy    Heart murmur May 2020    High cholesterol     Hypertension     Kidney stone 1980's    Have had since 1980's    Obesity Since Childhood     Past Surgical History:   Procedure Laterality Date    BUNIONECTOMY Right 11/24/2020    Procedure: RIGHT HAV CORRECTION,;  Surgeon: Niranjan Child DPM;  [facility]: [facility];  Service: Podiatry    COLONOSCOPY       INCISION AND DRAINAGE OF WOUND Right 10/05/2016    Procedure: INCISION AND DRAINAGE (I&D) EXTREMITY;  Surgeon: Niranjan Child DPM;  Location: [facility];  Service:     IR BIOPSY LYMPH NODE  07/08/2021    IR EMBOLIZATION (SPECIFY VESSEL OR SITE)  07/08/2021    IR PORT PLACEMENT  9/1/2022    PILONIDAL CYST EXCISION      NJ CORRECTION HAMMERTOE Right 02/19/2019    Procedure: THIRD HAMMER TOE CORRECTION;  Surgeon: Niranjan Child DPM;  [facility];  Service: Podiatry    NJ RMVL MATEO CTR VAD W/SUBQ PORT/ CTR/PRPH INSJ N/A 3/14/2023    Procedure: REMOVAL VENOUS PORT (PORT-A-CATH)IR;  Surgeon: Avelino Vázquez DO;  [facility];  Service: Interventional Radiology    TOE OSTEOTOMY Right 03/14/2017    Procedure: HAMMERTOE CORRECTION R 2 ;  Surgeon: Niranjan Child DPM;  [facility];  Service:     TOE OSTEOTOMY Left 11/24/2020    Procedure: LEFT HT CORRECTION TOE;  Surgeon: Niranjan Child DPM;  [facility];  Service: Podiatry    TONSILLECTOMY  1963     Social History     Substance and Sexual Activity   Alcohol Use Never     Social History     Substance and Sexual Activity   Drug Use Never     Social History     Tobacco Use   Smoking Status Never   Smokeless Tobacco Never   Tobacco Comments    Never smoked but exposed to second hand smoke from birth until 1980's     Family History   Problem Relation Age of Onset    Cancer Father         Leukemia       Meds/Allergies   all current active meds have been reviewed, current meds:   Current Facility-Administered Medications   Medication Dose Route Frequency    acetaminophen (Ofirmev) injection 1,000 mg  1,000 mg Intravenous Q6H [facility]    acetaminophen (TYLENOL) tablet 650 mg  650 mg Oral Q6H PRN    amiodarone (CORDARONE) 900 mg in dextrose 5 % 500 mL infusion  0.5 mg/min Intravenous Continuous    bumetanide (BUMEX) 12.5 mg infusion 50 mL  2 mg/hr Intravenous Continuous    calcium carbonate (TUMS) chewable tablet 1,000 mg  1,000 mg Oral Daily PRN     ceFAZolin (ANCEF) IVPB (premix in dextrose) 2,000 mg 50 mL  2,000 mg Intravenous Q6H    chlorhexidine (PERIDEX) 0.12 % oral rinse 15 mL  15 mL Mouth/Throat Q12H SRIDHAR    colchicine (COLCRYS) tablet 0.6 mg  0.6 mg Oral BID    docusate sodium (COLACE) capsule 100 mg  100 mg Oral BID    heparin (porcine) 25,000 units in 0.45% NaCl 250 mL infusion (premix)  3-20 Units/kg/hr (Order-Specific) Intravenous Titrated    heparin (porcine) injection 2,000 Units  2,000 Units Intravenous Q6H PRN    heparin (porcine) injection 4,000 Units  4,000 Units Intravenous Q6H PRN    HYDROmorphone (DILAUDID) injection 0.5 mg  0.5 mg Intravenous Q3H PRN    insulin regular (HumuLIN R,NovoLIN R) 1 Units/mL in sodium chloride 0.9 % 100 mL infusion  0.3-21 Units/hr Intravenous Titrated    methocarbamol (ROBAXIN) tablet 750 mg  750 mg Oral Q6H SRIDHAR    norepinephrine (LEVOPHED) 4 mg (STANDARD CONCENTRATION) IV in sodium chloride 0.9% 250 mL  1-30 mcg/min Intravenous Titrated    ondansetron (ZOFRAN) injection 4 mg  4 mg Intravenous Q6H PRN    oxyCODONE (ROXICODONE) immediate release tablet 10 mg  10 mg Oral Q4H PRN    oxyCODONE (ROXICODONE) IR tablet 5 mg  5 mg Oral Q4H PRN    polyethylene glycol (MIRALAX) packet 17 g  17 g Oral Daily    predniSONE tablet 30 mg  30 mg Oral Daily    senna (SENOKOT) tablet 8.6 mg  1 tablet Oral Daily    Sodium Zirconium Cyclosilicate (Lokelma) 10 g  10 g Oral TID    vasopressin (PITRESSIN) 20 Units in sodium chloride 0.9 % 100 mL infusion  0.04 Units/min Intravenous Continuous   , and PTA meds:   Prior to Admission Medications   Prescriptions Last Dose Informant Patient Reported? Taking?   Cholecalciferol 100 MCG (4000 UT) CAPS  Self No No   Sig: Take 1 capsule (4,000 Units total) by mouth in the morning   Diclofenac Sodium (VOLTAREN) 1 %   No No   Sig: Apply 2 g topically 4 (four) times a day   Patient not taking: Reported on 8/12/2024   Diclofenac Sodium (VOLTAREN) 1 %   No No   Sig: Apply 2 g topically 4 (four) times  a day For pain to affected area   Patient not taking: Reported on 8/12/2024   Diclofenac Sodium (VOLTAREN) 1 %   No No   Sig: Apply 2 g topically 4 (four) times a day   Patient not taking: Reported on 8/20/2024   Empagliflozin (Jardiance) 25 MG TABS  Self No No   Sig: TAKE 1 TABLET BY MOUTH EVERY DAY IN THE MORNING   Multiple Vitamins-Minerals (Centrum Silver 50+Men) TABS  Self Yes No   allopurinol (ZYLOPRIM) 300 mg tablet  Self No No   Sig: Take 1 tablet (300 mg total) by mouth daily   amLODIPine (NORVASC) 10 mg tablet  Self No No   Sig: Take 1 tablet (10 mg total) by mouth daily   aspirin 81 mg chewable tablet  Self Yes No   Sig: Chew 81 mg daily   cyclobenzaprine (FLEXERIL) 10 mg tablet   No No   Sig: Take 1 tablet (10 mg total) by mouth 3 (three) times a day as needed for muscle spasms   Patient not taking: Reported on 8/20/2024   hydroCHLOROthiazide 25 mg tablet  Self No No   Sig: Take 1 tablet (25 mg total) by mouth daily   ketorolac (TORADOL) 10 mg tablet   No No   Sig: Take 0.5 tablets (5 mg total) by mouth every 6 (six) hours as needed for moderate pain for up to 5 days   Patient not taking: Reported on 8/12/2024   lidocaine (LMX) 4 % cream   No No   Sig: Apply topically as needed for mild pain   Patient not taking: Reported on 8/20/2024   lidocaine (Lidoderm) 5 %  Self No No   Sig: Apply 1 patch topically over 12 hours daily for 14 days Remove & Discard patch within 12 hours or as directed by MD   losartan (COZAAR) 100 MG tablet  Self No No   Sig: Take 1 tablet (100 mg total) by mouth daily   metFORMIN (GLUCOPHAGE) 500 mg tablet  Self No No   Sig: TAKE 2 TABLETS BY MOUTH TWICE A DAY WITH FOOD   metoprolol succinate (TOPROL-XL) 100 mg 24 hr tablet  Self No No   Sig: Take 1 tablet (100 mg total) by mouth every evening   oxyCODONE (Roxicodone) 5 immediate release tablet  Self No No   Sig: Take 1 tablet (5 mg total) by mouth every 6 (six) hours as needed for moderate pain for up to 7 doses Max Daily Amount: 20  mg   Patient not taking: Reported on 8/12/2024   rosuvastatin (CRESTOR) 40 MG tablet  Self No No   Sig: Take 1 tablet (40 mg total) by mouth every evening   tiZANidine (ZANAFLEX) 4 mg tablet   No No   Sig: Take 1 tablet (4 mg total) by mouth 3 (three) times a day for 14 days      Facility-Administered Medications: None     Allergies   Allergen Reactions    Lisinopril Cough       Objective   I/O         08/22 0701 08/23 0700 08/23 0701 08/24 0700 08/24 0701 08/25 0700    I.V. (mL/kg)  1445.8 (11.8) 1027.2 (8.4)    IV Piggyback  1245.8 50    Total Intake(mL/kg)  2691.7 (21.9) 1077.2 (8.8)    Urine (mL/kg/hr)  635 135 (0.2)    Total Output  635 135    Net  +2056.7 +942.2                   Physical Exam  Constitutional:       General: He is not in acute distress.     Appearance: Normal appearance. He is well-developed. He is not ill-appearing.   HENT:      Head: Normocephalic and atraumatic.      Right Ear: External ear normal.      Left Ear: External ear normal.      Nose: Nose normal.      Mouth/Throat:      Mouth: Mucous membranes are moist.   Eyes:      General: No scleral icterus.        Right eye: No discharge.         Left eye: No discharge.      Extraocular Movements: Extraocular movements intact.      Conjunctiva/sclera: Conjunctivae normal.      Pupils: Pupils are equal, round, and reactive to light.   Cardiovascular:      Rate and Rhythm: Normal rate.   Pulmonary:      Effort: Pulmonary effort is normal. No respiratory distress.   Skin:     General: Skin is warm and dry.   Neurological:      Mental Status: He is alert.      Motor: Motor strength is normal.  Psychiatric:         Mood and Affect: Mood normal.         Speech: Speech normal.         Behavior: Behavior normal.         Thought Content: Thought content normal.         Judgment: Judgment normal.       Neurologic Exam     Mental Status   Follows 3 step commands.   Attention: normal. Concentration: normal.   Speech: speech is normal   Level of  "consciousness: alert  Knowledge: good.   Normal comprehension.     Cranial Nerves     CN III, IV, VI   Pupils are equal, round, and reactive to light.  Right pupil: Shape: regular. Reactivity: brisk.   Left pupil: Shape: regular. Reactivity: brisk.   Upgaze: normal  Conjugate gaze: present    CN V   Facial sensation intact.     CN VII   Facial expression full, symmetric.   Right facial weakness: Slight baseline decreased nasolabial fold, symmetric smile.    CN VIII   Hearing: intact    CN XI   Right trapezius strength: normal  Left trapezius strength: normal    CN XII   Tongue: not atrophic  Fasciculations: absent  Tongue deviation: none    Motor Exam   Muscle bulk: normal  Overall muscle tone: normal    Strength   Strength 5/5 throughout.     Sensory Exam   Right arm light touch: normal  Left arm light touch: normal  Right leg light touch: decreased from ankle  Left leg light touch: decreased from ankle  Chronic sensation changes bilateral feet related to diabetes.     Gait, Coordination, and Reflexes     Tremor   Resting tremor: absent  Intention tremor: absent  Action tremor: absent    Reflexes   Right Orantes: absent  Left Orantes: absent  Right ankle clonus: absent  Left ankle clonus: absent      Vitals:Blood pressure 134/96, pulse (!) 110, temperature (!) 97.3 °F (36.3 °C), temperature source Axillary, resp. rate 21, height 6' 3\" (1.905 m), weight 123 kg (272 lb), SpO2 94%.,Body mass index is 34 kg/m².     Lab Results:   Results from last 7 days   Lab Units 08/24/24  0403 08/23/24  1939 08/23/24  0632   WBC Thousand/uL 22.20* 28.03* 25.32*   HEMOGLOBIN g/dL 10.4* 10.5* 11.2*   HEMATOCRIT % 34.2* 34.6* 36.3*   PLATELETS Thousands/uL 383 430* 356   SEGS PCT % 89* 88* 88*   MONO PCT % 6 8 7   EOS PCT % 0 0 0     Results from last 7 days   Lab Units 08/24/24  1003 08/24/24  0620 08/24/24  0403 08/24/24  0201 08/23/24  2155 08/23/24  1939 08/23/24  1634 08/23/24  0906   POTASSIUM mmol/L 5.5* 6.1*  --  6.0*   < > " "5.7*   < > 6.3*   CHLORIDE mmol/L 96 94*  --  93*   < > 92*   < > 95*   CO2 mmol/L 22 23  --  23   < > 25   < > 23   BUN mg/dL 54* 55*  --  52*   < > 46*   < > 43*   CREATININE mg/dL 3.06* 3.12*  --  3.04*   < > 2.92*   < > 2.71*   CALCIUM mg/dL 8.8 9.2  --  9.4   < > 10.0   < > 10.0   ALK PHOS U/L  --   --  179*  --   --  181*  --  187*   ALT U/L  --   --  418*  --   --  49  --  34   AST U/L  --   --  2,452*  --   --  162*  --  75*    < > = values in this interval not displayed.     Results from last 7 days   Lab Units 08/24/24  1003 08/24/24  0201 08/23/24  2155   MAGNESIUM mg/dL 2.3 2.4 2.5     Results from last 7 days   Lab Units 08/24/24  1003 08/24/24  0201 08/23/24  2155   PHOSPHORUS mg/dL 7.4* 8.1* 7.7*     Results from last 7 days   Lab Units 08/24/24  0201 08/23/24  2155 08/23/24  1634 08/22/24  1552 08/22/24  0926   INR   --   --   --   --  1.21*   PTT seconds 62* 61* 59*   < > 40*    < > = values in this interval not displayed.     No results found for: \"TROPONINT\"  ABG:No results found for: \"PHART\", \"ERP0XYD\", \"PO2ART\", \"IGU8TIW\", \"U6SYDQOZ\", \"BEART\", \"SOURCE\"    Imaging Studies: I have personally reviewed pertinent reports.   and I have personally reviewed pertinent films in PACS        VTE Prophylaxis: Sequential compression device (Venodyne)  and Heparin    Code Status: Level 1 - Full Code  Advance Directive and Living Will:      Power of :    POLST:      Counseling / Coordination of Care  I spent 20 minutes with the patient.    "

## 2024-08-24 NOTE — ASSESSMENT & PLAN NOTE
Tylenol 1g IV q6h  Oxycodone 5 mg/10 mg q4h PRN   Add robaxin 750 mg every 6 hrs  Add dilaudid 0.5 mg IV q3h PRN for breakthrough pain

## 2024-08-24 NOTE — ASSESSMENT & PLAN NOTE
8/22 Echo: Limited echo for assessment of endocarditis.  The patient's aortic valve is difficult to assess due to calcification but there is no new significant regurgitation.  The mitral valve has hyperechoic thickening at the tips consistent with most likely calcification, these were seen on the echocardiograms in July.  The tricuspid valve similarly was not well-visualized but no new regurgitation.  The pulmonary valve was not well-visualized. Off note patient has new moderate pericardial effusion without specific echocardiographic signs of tamponade  Patient with symptoms consistent with pericarditis    Plan:  Cardiology consult - consider pericardiocentesis vs window to culture fluid  Colchicine 0.6 mg BID  Unable to initiate NSAIDs 2/2 FINA  Start prednisone 30 mg daily  Close hemodynamic monitoring

## 2024-08-24 NOTE — ASSESSMENT & PLAN NOTE
Developed FINA with hyperkalemia  Received medical therapy with insulin, calcium, bicarb, lasix  Continue lokelma  Trend BMP q6h

## 2024-08-24 NOTE — ASSESSMENT & PLAN NOTE
Hx HTN on norvasc, HCTZ, losartan, Toprol-XL  All currently on hold given hypotension  Continue statin  Aspirin on hold - discuss restarting

## 2024-08-24 NOTE — PLAN OF CARE
Problem: Prexisting or High Potential for Compromised Skin Integrity  Goal: Skin integrity is maintained or improved  Description: INTERVENTIONS:  - Identify patients at risk for skin breakdown  - Assess and monitor skin integrity  - Assess and monitor nutrition and hydration status  - Monitor labs   - Assess for incontinence   - Turn and reposition patient  - Assist with mobility/ambulation  - Relieve pressure over bony prominences  - Avoid friction and shearing  - Provide appropriate hygiene as needed including keeping skin clean and dry  - Evaluate need for skin moisturizer/barrier cream  - Collaborate with interdisciplinary team   - Patient/family teaching  - Consider wound care consult   Outcome: Progressing     Problem: NEUROSENSORY - ADULT  Goal: Achieves stable or improved neurological status  Description: INTERVENTIONS  - Monitor and report changes in neurological status  - Monitor vital signs such as temperature, blood pressure, glucose, and any other labs ordered   - Initiate measures to prevent increased intracranial pressure  - Monitor for seizure activity and implement precautions if appropriate      Outcome: Progressing     Problem: CARDIOVASCULAR - ADULT  Goal: Maintains optimal cardiac output and hemodynamic stability  Description: INTERVENTIONS:  - Monitor I/O, vital signs and rhythm  - Monitor for S/S and trends of decreased cardiac output  - Administer and titrate ordered vasoactive medications to optimize hemodynamic stability  - Assess quality of pulses, skin color and temperature  - Assess for signs of decreased coronary artery perfusion  - Instruct patient to report change in severity of symptoms  Outcome: Progressing  Goal: Absence of cardiac dysrhythmias or at baseline rhythm  Description: INTERVENTIONS:  - Continuous cardiac monitoring, vital signs, obtain 12 lead EKG if ordered  - Administer antiarrhythmic and heart rate control medications as ordered  - Monitor electrolytes and  administer replacement therapy as ordered  Outcome: Progressing     Problem: RESPIRATORY - ADULT  Goal: Achieves optimal ventilation and oxygenation  Description: INTERVENTIONS:  - Assess for changes in respiratory status  - Assess for changes in mentation and behavior  - Position to facilitate oxygenation and minimize respiratory effort  - Oxygen administered by appropriate delivery if ordered  - Initiate smoking cessation education as indicated  - Encourage broncho-pulmonary hygiene including cough, deep breathe, Incentive Spirometry  - Assess the need for suctioning and aspirate as needed  - Assess and instruct to report SOB or any respiratory difficulty  - Respiratory Therapy support as indicated  Outcome: Progressing     Problem: GENITOURINARY - ADULT  Goal: Maintains or returns to baseline urinary function  Description: INTERVENTIONS:  - Assess urinary function  - Encourage oral fluids to ensure adequate hydration if ordered  - Administer IV fluids as ordered to ensure adequate hydration  - Administer ordered medications as needed  - Offer frequent toileting  - Follow urinary retention protocol if ordered  Outcome: Progressing  Goal: Absence of urinary retention  Description: INTERVENTIONS:  - Assess patient’s ability to void and empty bladder  - Monitor I/O  - Bladder scan as needed  - Discuss with physician/AP medications to alleviate retention as needed  - Discuss catheterization for long term situations as appropriate  Outcome: Progressing  Goal: Urinary catheter remains patent  Description: INTERVENTIONS:  - Assess patency of urinary catheter  - If patient has a chronic avilez, consider changing catheter if non-functioning  - Follow guidelines for intermittent irrigation of non-functioning urinary catheter  Outcome: Progressing     Problem: METABOLIC, FLUID AND ELECTROLYTES - ADULT  Goal: Electrolytes maintained within normal limits  Description: INTERVENTIONS:  - Monitor labs and assess patient for signs  and symptoms of electrolyte imbalances  - Administer electrolyte replacement as ordered  - Monitor response to electrolyte replacements, including repeat lab results as appropriate  - Instruct patient on fluid and nutrition as appropriate  Outcome: Progressing  Goal: Fluid balance maintained  Description: INTERVENTIONS:  - Monitor labs   - Monitor I/O and WT  - Instruct patient on fluid and nutrition as appropriate  - Assess for signs & symptoms of volume excess or deficit  Outcome: Progressing  Goal: Glucose maintained within target range  Description: INTERVENTIONS:  - Monitor Blood Glucose as ordered  - Assess for signs and symptoms of hyperglycemia and hypoglycemia  - Administer ordered medications to maintain glucose within target range  - Assess nutritional intake and initiate nutrition service referral as needed  Outcome: Progressing     Problem: SKIN/TISSUE INTEGRITY - ADULT  Goal: Skin Integrity remains intact(Skin Breakdown Prevention)  Description: Assess:  -Perform Iraj assessment every ***  -Clean and moisturize skin every ***  -Inspect skin when repositioning, toileting, and assisting with ADLS  -Assess under medical devices such as *** every ***  -Assess extremities for adequate circulation and sensation     Bed Management:  -Have minimal linens on bed & keep smooth, unwrinkled  -Change linens as needed when moist or perspiring  -Avoid sitting or lying in one position for more than *** hours while in bed  -Keep HOB at ***degrees     Toileting:  -Offer bedside commode  -Assess for incontinence every ***  -Use incontinent care products after each incontinent episode such as ***    Activity:  -Mobilize patient *** times a day  -Encourage activity and walks on unit  -Encourage or provide ROM exercises   -Turn and reposition patient every *** Hours  -Use appropriate equipment to lift or move patient in bed  -Instruct/ Assist with weight shifting every *** when out of bed in chair  -Consider limitation of  chair time *** hour intervals    Skin Care:  -Avoid use of baby powder, tape, friction and shearing, hot water or constrictive clothing  -Relieve pressure over bony prominences using ***  -Do not massage red bony areas    Next Steps:  -Teach patient strategies to minimize risks such as ***   -Consider consults to  interdisciplinary teams such as ***  Outcome: Progressing  Goal: Incision(s), wounds(s) or drain site(s) healing without S/S of infection  Description: INTERVENTIONS  - Assess and document dressing, incision, wound bed, drain sites and surrounding tissue  - Provide patient and family education  - Perform skin care/dressing changes every ***  Outcome: Progressing  Goal: Pressure injury heals and does not worsen  Description: Interventions:  - Implement low air loss mattress or specialty surface (Criteria met)  - Apply silicone foam dressing  - Instruct/assist with weight shifting every *** minutes when in chair   - Limit chair time to *** hour intervals  - Use special pressure reducing interventions such as *** when in chair   - Apply fecal or urinary incontinence containment device   - Perform passive or active ROM every ***  - Turn and reposition patient & offload bony prominences every *** hours   - Utilize friction reducing device or surface for transfers   - Consider consults to  interdisciplinary teams such as ***  - Use incontinent care products after each incontinent episode such as ***  - Consider nutrition services referral as needed  Outcome: Progressing     Problem: HEMATOLOGIC - ADULT  Goal: Maintains hematologic stability  Description: INTERVENTIONS  - Assess for signs and symptoms of bleeding or hemorrhage  - Monitor labs  - Administer supportive blood products/factors as ordered and appropriate  Outcome: Progressing     Problem: MUSCULOSKELETAL - ADULT  Goal: Maintain or return mobility to safest level of function  Description: INTERVENTIONS:  - Assess patient's ability to carry out ADLs;  assess patient's baseline for ADL function and identify physical deficits which impact ability to perform ADLs (bathing, care of mouth/teeth, toileting, grooming, dressing, etc.)  - Assess/evaluate cause of self-care deficits   - Assess range of motion  - Assess patient's mobility  - Assess patient's need for assistive devices and provide as appropriate  - Encourage maximum independence but intervene and supervise when necessary  - Involve family in performance of ADLs  - Assess for home care needs following discharge   - Consider OT consult to assist with ADL evaluation and planning for discharge  - Provide patient education as appropriate  Outcome: Progressing  Goal: Maintain proper alignment of affected body part  Description: INTERVENTIONS:  - Support, maintain and protect limb and body alignment  - Provide patient/ family with appropriate education  Outcome: Progressing

## 2024-08-24 NOTE — CASE MANAGEMENT
Case Management Assessment & Discharge Planning Note    Patient name Augustus Coffman  Location [facility]01 MRN [id number]  : 1955 Date 2024       Current Admission Date: 2024  Current Admission Diagnosis:MSSA bacteremia   Patient Active Problem List    Diagnosis Date Noted Date Diagnosed    Hyperkalemia 2024     Acute pericarditis 2024     Diabetic ulcer of right foot (HCC) 2024     Acute respiratory insufficiency 2024     Hypotension 2024     Acute neck pain 2024     MSSA bacteremia 2024     New onset a-fib (formerly [facility]) 2024     Cervical discitis 2024     Transaminitis 2024     Acute renal failure with oliguria  (formerly [facility]) 2024     Neck pain 2024     Sepsis without acute organ dysfunction (formerly [facility]) 2024     Acute respiratory failure with hypoxia (formerly [facility]) 2024     Acute hyponatremia 2024     Chronic diastolic CHF (congestive heart failure) (formerly [facility]) 2024     Encounter for deep vein thrombosis (DVT) prophylaxis 2024     Hyponatremia 2024     Aortic stenosis with trileaflet valve 07/15/2024     Ectatic thoracic aorta (formerly [facility]) 2024     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 (formerly [facility]) 10/09/2023     Vitamin D deficiency 2023     Stage 3a chronic kidney disease (formerly [facility]) 2023     Follicular lymphoma grade I of lymph nodes of multiple sites (formerly [facility]) 08/15/2022     Obesity, morbid (formerly [facility]) 2022     Other proteinuria 2022     Stage 2 chronic kidney disease 09/15/2021     Hypertensive kidney disease with stage 2 chronic kidney disease 09/15/2021     Rash 2021     Chronic venous hypertension (idiopathic) with ulcer of right lower extremity (formerly [facility]) 2021     GI bleed 2021     Pleural effusion 2021     Heart murmur 2021     Retroperitoneal hematoma 2021     Mesenteric lymphadenopathy 2021     Acute blood loss anemia 2021     FINA (acute kidney injury) on CKD stage  2(HCC) 07/08/2021     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     Stasis dermatitis of both legs 04/17/2019     Hammer toe of right foot 02/19/2019     Obesity with body mass index 30 or greater 10/31/2016     Diabetes 1.5, 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     History of hypertension 10/06/2016     Hypercholesteremia 10/06/2016     Diabetic peripheral neuropathy (HCC) 11/10/2014     Gout 12/06/2007     Type 2 diabetes mellitus (HCC) 12/06/2007       LOS (days): 1  Geometric Mean LOS (GMLOS) (days):   Days to GMLOS:     OBJECTIVE:  PATIENT READMITTED TO HOSPITAL  Risk of Unplanned Readmission Score: 35.75         Current admission status: Inpatient       Preferred Pharmacy:   [id number]  [place] - [address] [address]  Phone: [phone] Fax: [phone]    [id number] [place] - [address] [address]  Phone: [phone] Fax: [phone]    Primary Care Provider: Gwen Lazo DO    Primary Insurance: MEDICARE  Secondary Insurance: [employer]    ASSESSMENT:  Active Health Care Proxies       Sri Coffman Health Care Representative - Spouse   Primary Phone: [phone] (Mobile)  Home Phone: [phone]  Work Phone: [phone]                      Patient Information  Mental Status: Alert       See CM assessment done on 8/21/24

## 2024-08-24 NOTE — ASSESSMENT & PLAN NOTE
Lab Results   Component Value Date    HGBA1C 6.8 (H) 07/05/2024       Recent Labs     08/23/24  2048 08/23/24  2314 08/24/24  0332 08/24/24  0357   POCGLU 222* 207* 307* 213*         Blood Sugar Average: Last 72 hrs:  (P) 237.25  Home regimen: metformin  Continue SSI coverage with QID accuchecks   Goal blood glucose 140-180

## 2024-08-24 NOTE — CONSULTS
Consultation - Infectious Disease   Augustus Coffman 68 y.o. male MRN: [id number]  Unit/Bed#: PPHP-310-01 Encounter: [id number]      IMPRESSION & RECOMMENDATIONS:   1.  Shock.  Patient post transfer developed A-fib with RVR and also hypotension now requiring pressors.  White count elevated but this may be related to recent steroid.  Blood cultures have been clearing and so uncertain if his current picture is truly due to infection or may be inflammatory in conjunction with the volume/cardiac issues.  Will continue to focus therapy towards #2 while evaluating and dealing with other sources.  Transition back to Ancef, increased to 2 g every 6 hours  Recommend repeating MRI C-spine and obtain MRI T-spine when possible  Would evaluate for any meningeal enhancement/additional lesions  If MR imaging without meningeal enhancement will lower Ancef dose  Obtain CT chest/abdomen/pelvis, r/o occult sources (non-con for now)  Follow-up pending blood cultures  Continue to trend fever curve/vitals  Repeat CBC/chemistry tomorrow  Patient will need eventual transesophageal echo to r/o perivalvular complications  Recommend formal podiatry evaluation for #7, possible MRI/CT of site  Agree with obtaining pericardiocentesis to evaluate #4  Anticipate patient will need prolonged course of IV antibiotic  Ongoing follow-up by cardiology  Ongoing follow-up by nephrology  Additional supportive care as per primary  Additional interventions pending clinical course    2.  MSSA bacteremia.  Blood cultures have returned positive for MSSA and now appear to be clearing.  Initial source is #7.  Course now further complicated by #3 and #4.  2D echo limited.  Concern remains for endovascular/perivalvular complications given possible prolonged bacteremia.  Would question if patient is also developing secondary autoimmune response to his infection given #4 and 5.  Antibiotics adjusted as above  Follow-up pending blood cultures  Further imaging as  above  Eventual transesophageal echo  Prolonged antibiotic course anticipated  Trend fever curve/WBC    3.  C5-C6 discitis/osteomyelitis with epidural phlegmon/abscess.  Found on outpatient imaging.  Complication of #2.  Patient fortunately without any focal neurologic deficits.  Uncertain if this site may have worsened or if patient may have other lesions at levels below.  Neurosurgical evaluation noted  Recommend repeat MR imaging as above  Antibiotics as above  Trend fever curve/WBC  Monitor neuroexam  Anticipate prolonged antibiotic  Anticipate tracking ESR/CRP as outpatient  Anticipate repeat MRI outpatient to assure abscess resolution prior to stopping antibiotic    4.  Acute pericarditis with new pericardial effusion.  Patient developed abrupt onset of chest pain with ST elevations.  Troponins negative.  Clinical picture seems consistent with acute pericarditis.  Uncertain however if he has a purulent pericarditis.  Thankfully no tamponade physiology.  Agree with obtaining pericardiocentesis for fluid cultures/cell counts  Antibiotics as above  Trend fever curve/WBC  Ongoing followed by cardiology  Thoracic surgery evaluation noted    5.  Acute kidney injury on chronic kidney disease.  Acute elevation in creatinine noted from baseline of 1-1.2.  Likely due to acute illness, shock and also consider postinfectious autoimmune process.  Follow-up nephrology evaluation  Antibiotics as above  Will avoid contrast for now  Will dose adjust antibiotics as needed  Repeat chemistry tomorrow  Fluid hydration per primary  Consider discontinuing steroids for now  Could consider renal biopsy as well    6.  Acute transaminitis.  Acute elevation in LFTs noted likely due to the above.  Right upper quadrant ultrasound unremarkable  Repeat LFTs tomorrow  Obtain imaging as above  Will hold off on use of nafcillin for now  Antibiotics otherwise as above    7.  Chronic right foot ulcer with hardware present.  Patient has had a  chronic ulcer present for some time.  Imaging reviewed and there is hardware present.  No recent podiatry evaluation noted.  Primary source for patient's initial bacteremia in July.  Recommend formal podiatry evaluation  May need MRI or CT imaging of the site  May need to consider hardware removal given complications above  Trend fever curve/WBC    8.  Type 2 diabetes and obesity.  Hemoglobin A1c of 6.8.  BMI of 34.  The latter can impact antibiotic dosing.  Will continue current antibiotic dosing as above pending further imaging and favor higher dosing when possible.  Glucose management per primary    9.  Lymphoma, in remission.  Patient is not on any active chemotherapy or treatment and has been in remission for about 2 years.  Follow-up with chronic providers outpatient.    Above plan discussed in detail with patient and family at bedside  Above plan discussed in detail with primary service advance practitioner who is aware of recommendation for adjustment to current antibiotics and additional imaging.  Above plan discussed with cardiology resident and agreed with plan for obtaining diagnostic pericardiocentesis given current clinical picture    ID consult service will reevaluate this patient again over the weekend as needed.  Please call if questions in the interim.    HISTORY OF PRESENT ILLNESS:  Reason for Consult: Complicated MSSA bacteremia.    HPI: Augustus Coffman is a 68 y.o. year old male with diabetes complicated by peripheral neuropathy and diabetic foot wound, hypertension, CHF, stasis dermatitis of the bilateral lower extremities, obesity, hyperlipidemia, CKD, follicular lymphoma now in remission.  He presented to outside campus after outpatient MRI for progressive neck pain which ultimately showed discitis/osteomyelitis at C5-C6 with 3 mm epidural phlegmon/small abscess.  Patient had an earlier admission at the end of July for right diabetic foot wound and culture isolated MSSA.  Admission blood  culture was positive.  Patient was treated with short course of antibiotic.  Course of the time was complicated by neck pain and CT imaging had been unremarkable favoring musculoskeletal process.  Patient was advised to follow-up with orthopedics and when he did based on his exam and MRI was ordered and he was recommended to come into the ER.  White count was unremarkable and creatinine was 1.3.  LFTs mildly elevated at the time.  Blood cultures were obtained which later returned positive.  Sed rate and CRP were elevated.  Right upper quadrant ultrasound was done which was unremarkable.  Neurosurgery was consulted and patient was started on antibiotics given prior blood cultures.  IR service was consulted and was following.  Patient while on antibiotics started to develop acute onset chest pain and diaphoresis on 8/22.  EKG notable for ST elevations and troponin was unremarkable.  Cardiology was consulted.  Echo was completed which showed new moderate pericardial effusion not present on the echo previously.  Consideration therefore was for pericarditis with the possibility of a purulent effusion.  Patient was recommended for transfer for surgical evaluations and possible pericardiocentesis.  Patient's blood cultures notably from 8/22 are clearing.  Repeat blood cultures are pending.  Urine culture was obtained which had low growth of Enterococcus.  No new images overnight.  Documented vitals largely stable.  Patient's kidney function was notably worsening and so he was also seen by nephrology at outside campus.  Labs now concerning for worsening FINA along with hyperkalemia.  Patient also developed A-fib with RVR and associated hypotension.  He was given amiodarone bolus.  He remains on pressors currently.  Patient is currently awake and able to provide detailed history.  He however becomes breathless very quickly.  He offers no other focal areas of discomfort other than his chest and his neck.  He denies having any  other devices in place.  We reviewed current antibiotics and plans for further workup.  Additional questions were answered for patient's daughter and wife at bedside.  Case discussed with critical care advanced practitioner.  We are consulted at this time for further assistance with management.      REVIEW OF SYSTEMS:  A complete 12 point system-based review of systems is negative other than that noted in the HPI.    PAST MEDICAL HISTORY:  Past Medical History:   Diagnosis Date    Arthritis 2010's    Occasionally flares up    Cancer (HCC) May 2021    Still investigating    Chronic kidney disease     Diabetes mellitus (HCC)     Follicular lymphoma (HCC)     GERD (gastroesophageal reflux disease) June 2021    Noticed in an Endoscopy    Heart murmur May 2020    High cholesterol     Hypertension     Kidney stone 1980's    Have had since 1980's    Obesity Since Childhood     Past Surgical History:   Procedure Laterality Date    BUNIONECTOMY Right 11/24/2020    Procedure: RIGHT HAV CORRECTION,;  Surgeon: Niranjan Child DPM;  Location: [facility];  Service: Podiatry    COLONOSCOPY      INCISION AND DRAINAGE OF WOUND Right 10/05/2016    Procedure: INCISION AND DRAINAGE (I&D) EXTREMITY;  Surgeon: Niranjan Child DPM;  Location: [facility];  Service:     IR BIOPSY LYMPH NODE  07/08/2021    IR EMBOLIZATION (SPECIFY VESSEL OR SITE)  07/08/2021    IR PORT PLACEMENT  9/1/2022    PILONIDAL CYST EXCISION      OR CORRECTION HAMMERTOE Right 02/19/2019    Procedure: THIRD HAMMER TOE CORRECTION;  Surgeon: Niranjan Child DPM;  Location: [facility];  Service: Podiatry    OR RMVL MATEO CTR VAD W/SUBQ PORT/ CTR/PRPH INSJ N/A 3/14/2023    Procedure: REMOVAL VENOUS PORT (PORT-A-CATH)IR;  Surgeon: Avelino Vázquez DO;  [facility];  Service: Interventional Radiology    TOE OSTEOTOMY Right 03/14/2017    Procedure: HAMMERTOE CORRECTION R 2 ;  Surgeon: Niranjan Child DPM;  [facility];  Service:     TOE OSTEOTOMY Left 11/24/2020     Procedure: LEFT HT CORRECTION TOE;  Surgeon: Niranjan Child DPM;  [facility];  Service: Podiatry    TONSILLECTOMY  1963       FAMILY HISTORY:  Non-contributory    SOCIAL HISTORY:  Social History   Social History     Substance and Sexual Activity   Alcohol Use Never     Social History     Substance and Sexual Activity   Drug Use Never     Social History     Tobacco Use   Smoking Status Never   Smokeless Tobacco Never   Tobacco Comments    Never smoked but exposed to second hand smoke from birth until        ALLERGIES:  Allergies   Allergen Reactions    Lisinopril Cough       MEDICATIONS:  All current active medications have been reviewed.    PHYSICAL EXAM:  Temp:  [97.5 °F (36.4 °C)-97.9 °F (36.6 °C)] 97.8 °F (36.6 °C)  HR:  [100-149] 112  Resp:  [8-39] 12  BP: ()/(50-89) 116/70  SpO2:  [83 %-100 %] 95 %  Temp (24hrs), Av.7 °F (36.5 °C), Min:97.5 °F (36.4 °C), Max:97.9 °F (36.6 °C)  Current: Temperature: 97.8 °F (36.6 °C)    Intake/Output Summary (Last 24 hours) at 2024 0918  Last data filed at 2024 0841  Gross per 24 hour   Intake 2691.66 ml   Output 745 ml   Net 1946.66 ml       General Appearance:  Chronically ill-appearing and fatigued.  Becomes winded with short conversation.   Head:  Normocephalic, without obvious abnormality, atraumatic   Eyes:  Conjunctiva pink and sclera anicteric, both eyes   Nose: Nares normal, mucosa normal, no drainage   Throat: Oropharynx moist without lesions   Neck: Supple, symmetrical, no adenopathy, mild discomfort with palpation and manipulation.   Lungs:   Clear to auscultation bilaterally, respirations unlabored on nasal cannula   Chest Wall:  No tenderness or deformity   Heart:  RRR; systolic murmur present, no rubs or gallops   Abdomen:   Soft, non-tender, non-distended, positive bowel sounds    Extremities: No cyanosis, clubbing or edema; venous stasis noted.   Skin: No rashes or lesions. No draining wounds noted.  Clinical images reviewed  of the patient's foot wound which is just newly dressed.   Lymph nodes: Cervical, supraclavicular nodes normal   Neurologic: Alert and oriented times 3, extremity strength 5/5 and symmetric       LABS, IMAGING, & OTHER STUDIES:  In completing this consult I have performed an extensive review of the medical records in epic including review of the notes, radiographs, and laboratory results as detailed below.     Lab Results:  I have personally reviewed pertinent labs.  Comments/Interpretations: Leukocytosis noted.  LFT elevation also noted.    Results from last 7 days   Lab Units 08/24/24  0403 08/23/24  1939 08/23/24  0632   WBC Thousand/uL 22.20* 28.03* 25.32*   HEMOGLOBIN g/dL 10.4* 10.5* 11.2*   PLATELETS Thousands/uL 383 430* 356     Results from last 7 days   Lab Units 08/24/24  0620 08/24/24  0403 08/23/24  2155 08/23/24  1939 08/23/24  1634 08/23/24  0906   POTASSIUM mmol/L 6.1*  --    < > 5.7*   < > 6.3*   CHLORIDE mmol/L 94*  --    < > 92*   < > 95*   CO2 mmol/L 23  --    < > 25   < > 23   BUN mg/dL 55*  --    < > 46*   < > 43*   CREATININE mg/dL 3.12*  --    < > 2.92*   < > 2.71*   EGFR ml/min/1.73sq m 19  --    < > 21   < > 23   CALCIUM mg/dL 9.2  --    < > 10.0   < > 10.0   AST U/L  --  2,452*  --  162*  --  75*   ALT U/L  --  418*  --  49  --  34   ALK PHOS U/L  --  179*  --  181*  --  187*    < > = values in this interval not displayed.     Results from last 7 days   Lab Units 08/23/24 2055 08/23/24 2011 08/22/24  0606 08/21/24  0103 08/20/24  1432 08/20/24  1430 08/20/24  1417   BLOOD CULTURE  Received in Microbiology Lab. Culture in Progress. Received in Microbiology Lab. Culture in Progress. No Growth at 48 hrs.  No Growth at 48 hrs.  --  Staphylococcus aureus* Staphylococcus aureus* Staphylococcus aureus*   GRAM STAIN RESULT   --   --   --   --  Gram positive cocci in clusters* Gram positive cocci in clusters* Gram positive cocci in clusters*   URINE CULTURE   --   --   --  10,000-19,000 cfu/ml  Enterococcus faecalis*  --   --   --        Imaging Studies:   I have personally reviewed pertinent imaging study reports and images in PACS.  Comments/Interpretations:  Right upper quadrant ultrasound unremarkable.  X-ray of the foot with hardware noted  Chest x-rays without any acute consolidations  MRI C-spine reviewed with changes of osteomyelitis    Other Studies:   I have personally reviewed other pertinent reports as below.  Records in CareEverywhere: No recent cultures    Nursing home/EMS Records: None    Current/Prior Cultures: Current blood cultures and prior blood cultures reviewed.

## 2024-08-24 NOTE — ASSESSMENT & PLAN NOTE
Hx HTN on norvasc, HCTZ, losartan, Toprol-XL  All currently on hold given hypotension  Continue statin  Aspirin on hold - discuss restarting in AM

## 2024-08-24 NOTE — PROGRESS NOTES
NEPHROLOGY PROGRESS NOTE   Augustus Coffman 68 y.o. male MRN: [id number]  Unit/Bed#: PPHP-310-01 Encounter: [id number]  Reason for Consult: FINA    ASSESSMENT AND PLAN:  Patient is 68-year-old male with significant medical issues of diabetes, follicular lymphoma, nephrolithiasis, recent MSSA bacteremia, presented with abnormal MRI cervical spine results.  We are consulted for FINA/CKD management.    FINA, baseline creatinine 0.9-1.1  -Creatinine now progressively worsening up to 3.1 today.  -FINA suspect in the setting of septic shock, ischemic/septic ATN, new onset A-fib with RVR, pericardial effusion causing hemodynamic insult.  -Urine output poor.  -Status post IV Lasix 120 mg this a.m.  Closely monitor urine output response.  I did have discussion with patient regarding persistent hyperkalemia, poor urine output, worsening renal function and likely need for dialysis/CRRT.  He is agreeable and has provided dialysis consent which is in the chart.  -Currently has Villa catheter.  Accurate intake and output  -Repeat BMP later this morning.  -UA this admission shows 10-20 RBCs, 2+ proteinuria, currently has Villa catheter    Persistent hyperkalemia, serum potassium remains above goal 6.1.  Status post Lokelma, Lasix yesterday.  Urine output lower this a.m.  -Receiving medical management for hyperkalemia as per ICU team.  Status post Lasix 120 mg earlier this a.m.  -If no significant response, consider starting CRRT.    Hyponatremia, corrected serum sodium around 133  -Strict fluid restriction 1.2 L/day recommended.  Monitor with diuretics.    Septic shock in the setting of MSSA bacteremia, currently remains on Levophed, vasopressin.  -Currently remains on 5 L O2 via nasal cannula.    MSSA bacteremia, in the setting of right foot ulcer, concern for cervical discitis  Cervical discitis/osteomyelitis with paraspinal phlegmon/abscess, management as per ID/ICU team.  Pericardial effusion, currently on steroids as per  cardiology/ICU.  Being evaluated for possible pericardiocentesis noted no plan for thoracic surgery intervention.    A-fib with RVR, management as per cardiology.  Transaminitis in the setting of shock liver, septic shock.  Continue to trend    Discussed above plan in detail with ICU team regarding close monitoring urine output response with Lasix, repeating BMP, likely need for CRRT and they agree with above recommendations.      SUBJECTIVE:  Patient seen and examined at bedside.  He remains on 5 L O2 via nasal cannula at the time of my encounter.    OBJECTIVE:  Current Weight: Weight - Scale: 124 kg (272 lb 4.3 oz)  Vitals:    08/24/24 0829   BP: 152/78   Pulse: (!) 112   Resp: 12   Temp:    SpO2: 95%       Intake/Output Summary (Last 24 hours) at 8/24/2024 0830  Last data filed at 8/24/2024 0601  Gross per 24 hour   Intake 2691.66 ml   Output 635 ml   Net 2056.66 ml     Wt Readings from Last 3 Encounters:   08/24/24 124 kg (272 lb 4.3 oz)   08/22/24 118 kg (260 lb)   08/20/24 119 kg (262 lb)     Temp Readings from Last 3 Encounters:   08/24/24 97.8 °F (36.6 °C) (Oral)   08/23/24 97.5 °F (36.4 °C)   08/15/24 97.6 °F (36.4 °C)     BP Readings from Last 3 Encounters:   08/24/24 152/78   08/23/24 (!) 73/50   08/15/24 127/66     Pulse Readings from Last 3 Encounters:   08/24/24 (!) 112   08/23/24 (!) 147   08/15/24 65        Physical Examination:  Eyes: Mild conjunctival pallor present  Neck: No obvious lymphadenopathy appreciated  Respiratory: Bilateral air entry present  CVS: Trace edema in legs  GI: Soft, nondistended  CNS: Active, alert, follows commands  Skin: No new rash  Musculoskeletal: No obvious new gross deformity noted    Medications:    Current Facility-Administered Medications:     acetaminophen (Ofirmev) injection 1,000 mg, 1,000 mg, Intravenous, Q6H Breonna WALLER PA-C, Last Rate: 400 mL/hr at 08/24/24 0806, 1,000 mg at 08/24/24 0806    acetaminophen (TYLENOL) tablet 650 mg, 650 mg, Oral, Q6H  PRN, Breonna Santana PA-C    [] amiodarone (CORDARONE) 900 mg in dextrose 5 % 500 mL infusion, 1 mg/min, Intravenous, Continuous, Stopped at 24 0231 **FOLLOWED BY** amiodarone (CORDARONE) 900 mg in dextrose 5 % 500 mL infusion, 1 mg/min, Intravenous, Continuous, Breonna Santana PA-C, Last Rate: 33.3 mL/hr at 24 0400, 1 mg/min at 24 0400    calcium carbonate (TUMS) chewable tablet 1,000 mg, 1,000 mg, Oral, Daily PRN, Breonna Santana PA-C    ceFEPime (MAXIPIME) 2,000 mg in dextrose 5 % 50 mL IVPB, 2,000 mg, Intravenous, Q12H, Breonna Santana PA-C, Last Rate: 100 mL/hr at 24 0400, Rate Verify at 24 0400    chlorhexidine (PERIDEX) 0.12 % oral rinse 15 mL, 15 mL, Mouth/Throat, Q12H SRIDHAR, Breonna Santana PA-C, 15 mL at 24 0817    colchicine (COLCRYS) tablet 0.6 mg, 0.6 mg, Oral, BID, Breonna Santana PA-C, 0.6 mg at 24 0821    docusate sodium (COLACE) capsule 100 mg, 100 mg, Oral, BID, Breonna Santana PA-C, 100 mg at 24 0824    heparin (porcine) 25,000 units in 0.45% NaCl 250 mL infusion (premix), 3-20 Units/kg/hr (Order-Specific), Intravenous, Titrated, Breonna Santana PA-C, Last Rate: 17.1 mL/hr at 24 0400, 19 Units/kg/hr at 24 0400    heparin (porcine) injection 2,000 Units, 2,000 Units, Intravenous, Q6H PRN, Breonna Santana PA-C    heparin (porcine) injection 4,000 Units, 4,000 Units, Intravenous, Q6H PRN, Breonna Santana PA-C    HYDROmorphone (DILAUDID) injection 0.5 mg, 0.5 mg, Intravenous, Q3H PRN, Breonna Santana PA-C, 0.5 mg at 24 0817    insulin regular (HumuLIN R,NovoLIN R) 1 Units/mL in sodium chloride 0.9 % 100 mL infusion, 0.3-21 Units/hr, Intravenous, Titrated, Ophelia Leon PA-C, Last Rate: 8 mL/hr at 24 0750, 8 Units/hr at 24 0750    methocarbamol (ROBAXIN) tablet 750 mg, 750 mg, Oral, Q6H SRIDHAR, Breonna Santana PA-C, 750 mg at 24 0821    norepinephrine (LEVOPHED) 4 mg (STANDARD CONCENTRATION) IV  in sodium chloride 0.9% 250 mL, 1-30 mcg/min, Intravenous, Titrated, Breonna Santana PA-C, Last Rate: 18.8 mL/hr at 08/24/24 0828, 5 mcg/min at 08/24/24 0828    ondansetron (ZOFRAN) injection 4 mg, 4 mg, Intravenous, Q6H PRN, Breonna Santana PA-C    oxyCODONE (ROXICODONE) immediate release tablet 10 mg, 10 mg, Oral, Q4H PRN, Breonna Santana PA-C, 10 mg at 08/24/24 0634    oxyCODONE (ROXICODONE) IR tablet 5 mg, 5 mg, Oral, Q4H PRN, Breonna Santana PA-C    polyethylene glycol (MIRALAX) packet 17 g, 17 g, Oral, Daily, Breonna Santana PA-C, 17 g at 08/24/24 0825    predniSONE tablet 30 mg, 30 mg, Oral, Daily, Breonna Santana PA-C, 30 mg at 08/24/24 0821    senna (SENOKOT) tablet 8.6 mg, 1 tablet, Oral, Daily, Breonna Santana PA-C, 8.6 mg at 08/24/24 0824    sodium chloride 0.9 % infusion, 100 mL/hr, Intravenous, Continuous, Ophelia Leon PA-C, Last Rate: 100 mL/hr at 08/24/24 0743, 100 mL/hr at 08/24/24 0743    Sodium Zirconium Cyclosilicate (Lokelma) 10 g, 10 g, Oral, TID, Breonna Santana PA-C, 10 g at 08/24/24 0051    vancomycin (VANCOCIN) 1,250 mg in sodium chloride 0.9 % 250 mL IVPB, 12.5 mg/kg (Adjusted), Intravenous, Daily PRN, Breonna Santana PA-C    vasopressin (PITRESSIN) 20 Units in sodium chloride 0.9 % 100 mL infusion, 0.04 Units/min, Intravenous, Continuous, Breonna Santana PA-C, Last Rate: 12 mL/hr at 08/24/24 0400, 0.04 Units/min at 08/24/24 0400    Laboratory Results:  Results from last 7 days   Lab Units 08/24/24  0620 08/24/24  0403 08/24/24  0201 08/23/24  2155 08/23/24  1939 08/23/24  1634 08/23/24  0906 08/23/24  0632 08/22/24  0550 08/21/24  0521 08/20/24  1417   WBC Thousand/uL  --  22.20*  --   --  28.03*  --   --  25.32* 14.77* 13.91* 9.61   HEMOGLOBIN g/dL  --  10.4*  --   --  10.5*  --   --  11.2* 11.2* 11.7* 10.5*   HEMATOCRIT %  --  34.2*  --   --  34.6*  --   --  36.3* 36.4* 37.0 33.8*   PLATELETS Thousands/uL  --  383  --   --  430*  --   --  356 303 270 245   SODIUM  "mmol/L 131*  --  130* 129* 132* 131* 132*  --  134* 136 137   POTASSIUM mmol/L 6.1*  --  6.0* 6.0* 5.7* 5.5* 6.3*  --  5.0 4.6 5.0   CHLORIDE mmol/L 94*  --  93* 93* 92* 94* 95*  --  97 99 97   CO2 mmol/L 23  --  23 22 25 21 23  --  26 26 30   BUN mg/dL 55*  --  52* 49* 46* 47* 43*  --  23 27* 39*   CREATININE mg/dL 3.12*  --  3.04* 3.02* 2.92* 2.91* 2.71*  --  1.08 1.03 1.36*   CALCIUM mg/dL 9.2  --  9.4 9.4 10.0 9.9 10.0  --  10.0 9.7 10.3*   MAGNESIUM mg/dL  --   --  2.4 2.5 2.4 2.2 2.2  --  2.2 2.0 2.2   PHOSPHORUS mg/dL  --   --  8.1* 7.7* 7.2* 8.0*  --   --   --   --   --        No orders to display       Portions of the record may have been created with voice recognition software. Occasional wrong word or \"sound a like\" substitutions may have occurred due to the inherent limitations of voice recognition software. Read the chart carefully and recognize, using context, where substitutions have occurred.    "

## 2024-08-24 NOTE — PROGRESS NOTES
[facility]  Progress Note  Name: Augustus Coffman I  MRN: [id number]  Unit/Bed#: PPHP-310-01 I Date of Admission: 8/23/2024   Date of Service: 8/24/2024 I Hospital Day: 1    Assessment & Plan   * MSSA bacteremia  Assessment & Plan  Etiology likely 2/2 right foot ulcer  Complicated by cervical discitis  Echo without evidence of endocarditis    Plan:  ID consulted, appreciate their recommendations  Plan for ancef x 6 weeks  Repeat blood cultures sent given episodes of diaphoresis/chills  Given history and rise in WBC, antibiotics broadened to vancomycin/cefepime 8/23  MRSA swab pending  UA with micro unremarkable for infection   Consider LUCRECIA given poor visualization of valves    Hypotension  Assessment & Plan  Developed new onset hypotension in the setting of afib with RVR  Levophed - titrate to maintain MAP > 65 mmHg  Continue vasopressin 0.04 units/min  Attempt rate control with amiodarone  Maintain jose/central line    New onset a-fib (HCC)  Assessment & Plan  Developed new onset afib 8/21/8/22  Initiated on heparin infusion and metoprolol  Unable to tolerate metoprolol 2/2 hypotension  Received amiodarone bolus and initiated on infusion    Plan:  Continue amiodarone infusion  Continue heparin drip    Acute pericarditis  Assessment & Plan  8/22 Echo: Limited echo for assessment of endocarditis.  The patient's aortic valve is difficult to assess due to calcification but there is no new significant regurgitation.  The mitral valve has hyperechoic thickening at the tips consistent with most likely calcification, these were seen on the echocardiograms in July.  The tricuspid valve similarly was not well-visualized but no new regurgitation.  The pulmonary valve was not well-visualized. Off note patient has new moderate pericardial effusion without specific echocardiographic signs of tamponade  Patient with symptoms consistent with pericarditis    Plan:  Cardiology consult -  consider pericardiocentesis vs window to culture fluid  Colchicine 0.6 mg BID  Unable to initiate NSAIDs 2/2 FINA  Prednisone 30 mg daily  Close hemodynamic monitoring    Acute respiratory insufficiency  Assessment & Plan  Currently requiring 6L NC and is tachypneic  Appears related to low lung volumes 2/2 pain  Wean O2 as tolerated to maintain Spo2 > 92%  Was unable to tolerate BiPAP    FINA (acute kidney injury) on CKD stage 2(HCC)  Assessment & Plan  Baseline Cr 1-1.2  Developed FINA 8/23 with associated hyperkalemia    Plan:  Nephrology following, appreciate their recommendations  Maintain avilez  Avoid nephrotoxins  Strict I/Os  Monitor renal function/Cr every 6 hours until plateau/improvement    Cervical discitis  Assessment & Plan  8/20 MRI C-Spine: Imaging findings suspicious for discitis osteomyelitis at C5-C6. 3 mm epidural phlegmon/small abscess is also suspected. There is moderate resultant central stenosis and mild mass effect on the cord  Neurosurgery consulted - recommended C-collar at all times but patient unable to tolerate  Recommending 6 weeks IV antibiotics and follow-up repeat imaging  Q4h neuro checks    Hyperkalemia  Assessment & Plan  Developed FINA with hyperkalemia  Received medical therapy with insulin, calcium, bicarb, lasix  Continue lokelma  Trend BMP q6h  May require HD if continues to remain elevated    Diabetic ulcer of right foot (HCC)  Assessment & Plan  Follows as an outpatient with wound care  Likely source of MSSA bacteremia   Continue local wound care    Type 2 diabetes mellitus (HCC)  Assessment & Plan  Lab Results   Component Value Date    HGBA1C 6.8 (H) 07/05/2024       Recent Labs     08/23/24  2048 08/23/24  2314 08/24/24  0332 08/24/24  0357   POCGLU 222* 207* 307* 213*         Blood Sugar Average: Last 72 hrs:  (P) 237.25  Home regimen: metformin  Continue SSI coverage with QID accuchecks   Goal blood glucose 140-180    History of hypertension  Assessment & Plan  Hx HTN on  norvasc, HCTZ, losartan, Toprol-XL  All currently on hold given hypotension  Continue statin  Aspirin on hold - discuss restarting    Acute neck pain  Assessment & Plan  Tylenol 1g IV q6h  Oxycodone 5 mg/10 mg q4h PRN   Robaxin 750 mg every 6 hrs  Dilaudid 0.5 mg IV q3h PRN for breakthrough pain    Diabetic peripheral neuropathy (HCC)  Assessment & Plan  Lab Results   Component Value Date    HGBA1C 6.8 (H) 07/05/2024       Recent Labs     08/23/24  2048 08/23/24  2314 08/24/24  0332 08/24/24  0357   POCGLU 222* 207* 307* 213*         Blood Sugar Average: Last 72 hrs:  (P) 237.25               Disposition: Critical care    ICU Core Measures     A: Assess, Prevent, and Manage Pain Has pain been assessed? Yes  Need for changes to pain regimen? No   B: Both SAT/SAT  N/A   C: Choice of Sedation RASS Goal: N/A patient not on sedation  Need for changes to sedation or analgesia regimen? No   D: Delirium CAM-ICU: Negative   E: Early Mobility  Plan for early mobility? Yes   F: Family Engagement Plan for family engagement today? Yes       Antibiotic Review: Awaiting culture results.  and Continue broad spectrum secondary to severity of illness.     Review of Invasive Devices:    [place] Plan: Continue for accurate I/O monitoring for 48 hours  Central access plan: Medications requiring central line  [place] Plan: Keep arterial line for hemodynamic monitoring    Prophylaxis:  VTE VTE covered by:  heparin (porcine), Intravenous, 19 Units/kg/hr at 08/24/24 0400  heparin (porcine), Intravenous  heparin (porcine), Intravenous       Stress Ulcer  not ordered         Significant 24hr Events     24hr events: Transferred from [place] yesterday evening. Worsening hypotension requiring increase in levophed and addition of vasopressin. Continues to have hyperkalemia despite multiple medical therapies.      Subjective   Review of Systems: Review of Systems   Respiratory:  Positive for shortness of breath.    Cardiovascular:  Negative for chest  pain, palpitations and leg swelling.   Gastrointestinal:  Positive for constipation. Negative for abdominal pain and nausea.   Musculoskeletal:  Positive for neck pain.        Objective                            Vitals I/O      Most Recent Min/Max in 24hrs   Temp 97.9 °F (36.6 °C) Temp  Min: 97.5 °F (36.4 °C)  Max: 100 °F (37.8 °C)   Pulse (!) 119 Pulse  Min: 100  Max: 149   Resp 17 Resp  Min: 8  Max: 39   /77 BP  Min: 73/50  Max: 150/79   O2 Sat 92 % SpO2  Min: 83 %  Max: 100 %      Intake/Output Summary (Last 24 hours) at 8/24/2024 0459  Last data filed at 8/24/2024 0401  Gross per 24 hour   Intake 2691.66 ml   Output 565 ml   Net 2126.66 ml       Diet Perico/CHO Controlled; Consistent Carbohydrate Diet Level 2 (5 carb servings/75 grams CHO/meal); Potassium 2 GM    Invasive Monitoring   Arterial Line  [place] /67  Arterial Line BP  Min: 77/54  Max: 129/76   MAP 81 mmHg  Arterial Line MAP (mmHg)  Min: 58 mmHg  Max: 93 mmHg           Physical Exam   Physical Exam  Skin:     General: Skin is warm and dry.      Capillary Refill: Capillary refill takes less than 2 seconds.   HENT:      Mouth/Throat:      Mouth: Mucous membranes are moist.   Neck:      Vascular: Central line present.   Cardiovascular:      Rate and Rhythm: Tachycardia present. Rhythm irregularly irregular.   Musculoskeletal:      Right lower leg: No edema.      Left lower leg: No edema.   Abdominal: General: There is distension.     Palpations: Abdomen is soft.      Tenderness: There is no abdominal tenderness.   Constitutional:       General: He is not in acute distress.     Appearance: He is ill-appearing.   Pulmonary:      Effort: Pulmonary effort is normal.      Breath sounds: No wheezing, rhonchi or rales.   Neurological:      General: No focal deficit present.      Mental Status: He is alert and oriented to person, place and time.   Genitourinary/Anorectal:  [place] present.          Diagnostic Studies      EKG: Acute pericarditis  Imaging:  CXR I have personally reviewed pertinent films in PACS     Medications:  Scheduled PRN   acetaminophen, 1,000 mg, Q6H SRIDHAR  atorvastatin, 80 mg, Daily With Dinner  cefepime, 2,000 mg, Q12H  chlorhexidine, 15 mL, Q12H SRIDHAR  colchicine, 0.6 mg, BID  docusate sodium, 100 mg, BID  insulin lispro, 2-12 Units, TID AC  insulin lispro, 2-12 Units, HS  methocarbamol, 750 mg, Q6H SRIDHAR  polyethylene glycol, 17 g, Daily  predniSONE, 30 mg, Daily  senna, 1 tablet, Daily  Sodium Zirconium Cyclosilicate, 10 g, TID      acetaminophen, 650 mg, Q6H PRN  calcium carbonate, 1,000 mg, Daily PRN  heparin (porcine), 2,000 Units, Q6H PRN  heparin (porcine), 4,000 Units, Q6H PRN  HYDROmorphone, 0.5 mg, Q3H PRN  ondansetron, 4 mg, Q6H PRN  oxyCODONE, 10 mg, Q4H PRN  oxyCODONE, 5 mg, Q4H PRN  vancomycin, 12.5 mg/kg (Adjusted), Daily PRN       Continuous    amiodarone (CORDARONE) 900 mg in dextrose 5 % 500 mL infusion, 1 mg/min, Last Rate: 1 mg/min (08/24/24 0400)  heparin (porcine), 3-20 Units/kg/hr (Order-Specific), Last Rate: 19 Units/kg/hr (08/24/24 0400)  multi-electrolyte, 100 mL/hr, Last Rate: 100 mL/hr (08/24/24 0400)  norepinephrine, 1-30 mcg/min, Last Rate: 7 mcg/min (08/24/24 0429)  vasopressin (PITRESSIN) 20 Units in sodium chloride 0.9 % 100 mL infusion, 0.04 Units/min, Last Rate: 0.04 Units/min (08/24/24 0400)         Labs:    CBC    Recent Labs     08/23/24  1939 08/24/24  0403   WBC 28.03* 22.20*   HGB 10.5* 10.4*   HCT 34.6* 34.2*   * 383     BMP    Recent Labs     08/23/24  2155 08/24/24  0201   SODIUM 129* 130*   K 6.0* 6.0*   CL 93* 93*   CO2 22 23   AGAP 14* 14*   BUN 49* 52*   CREATININE 3.02* 3.04*   CALCIUM 9.4 9.4       Coags    Recent Labs     08/22/24  0926 08/22/24  1552 08/23/24  2155 08/24/24  0201   INR 1.21*  --   --   --    PTT 40*   < > 61* 62*    < > = values in this interval not displayed.        Additional Electrolytes  Recent Labs     08/23/24  1939 08/23/24  2155 08/24/24  0201   MG 2.4 2.5 2.4   PHOS  7.2* 7.7* 8.1*   CAIONIZED 1.19  --   --           Blood Gas    No recent results  Recent Labs     08/23/24 1939   PHVEN 7.270*   ECC1FTO 60.3*   PO2VEN 33.0*   CBO6BMT 27.1   BEVEN -0.7   Y2VYQXC 53.3*    LFTs  Recent Labs     08/23/24  0906 08/23/24 1939   ALT 34 49   AST 75* 162*   ALKPHOS 187* 181*   ALB 3.5 3.3*   TBILI 0.63 0.61       Infectious  No recent results  Glucose  Recent Labs     08/23/24  1634 08/23/24  1939 08/23/24  2155 08/24/24  0201   GLUC 231* 220* 225* 224*               Breonna Santana PA-C

## 2024-08-24 NOTE — ASSESSMENT & PLAN NOTE
Etiology likely 2/2 right foot ulcer  Complicated by cervical discitis  Echo without evidence of endocarditis    Plan:  ID consulted, appreciate their recommendations  Plan for ancef x 6 weeks  Repeat blood cultures sent given episodes of diaphoresis/chills  Given history and rise in WBC, antibiotics broadened to vancomycin/cefepime 8/23  MRSA swab pending  UA with micro unremarkable for infection   Consider LUCRECIA given poor visualization of valves

## 2024-08-24 NOTE — ASSESSMENT & PLAN NOTE
Developed FINA with hyperkalemia  Received medical therapy with insulin, calcium, bicarb, lasix  Continue lokelma  Trend BMP q6h  May require HD if continues to remain elevated

## 2024-08-24 NOTE — ASSESSMENT & PLAN NOTE
Developed new onset afib 8/21/8/22  Initiated on heparin infusion and metoprolol  Unable to tolerate metoprolol 2/2 hypotension  Received amiodarone bolus and initiated on infusion    Plan:  Continue amiodarone infusion  Continue heparin drip

## 2024-08-24 NOTE — ASSESSMENT & PLAN NOTE
Follows as an outpatient with wound care  Likely source of MSSA bacteremia   Continue local wound care

## 2024-08-24 NOTE — ASSESSMENT & PLAN NOTE
Etiology likely 2/2 right foot ulcer  Complicated by cervical discitis  Echo without evidence of endocarditis    Plan:  ID consulted, appreciate their recommendations  Plan for ancef x 6 weeks  Repeat blood cultures now given episodes of diaphoresis/chills  Given history and rise in WBC, will broaden to vancomycin/cefepime  Check MRSA swab  Check UA with micro  Consider LUCRECIA given poor visualization of valves

## 2024-08-24 NOTE — ASSESSMENT & PLAN NOTE
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  Patient ultimately admitted at the end of July with sepsis and neck pain.  Patient was seen by orthopedics for evaluation of pain who ordered an outpatient MRI cervical spine demonstrating concern for osteitis discitis and patient was directed to the emergency department.  He was originally admitted to [facility] and unfortunately developed progressive sepsis, chest pain and diagnosed with pericardial effusion.  Patient was transferred to [facility] for surgical evaluations.    Imaging personally reviewed:  MRI cervical spine without 8/20/24: Mild degenerative changes C3-C6 with evidence of STIR signal abnormalities, endplate edema and mild disc edema at C5-C6 most consistent with osteomyelitis discitis especially in the setting of prior bacteremia.  There is also a small epidural collection phlegmon versus abscess resulting in moderate central stenosis without cord compression.  No cord signal abnormality.    Plan:   Continue monitor neurological exam.  At this time patient is without any radicular or myelopathic complaints or exam findings.  MRI results reviewed in detail with the patient and his family present at bedside.  Given nonfocal exam as well as no radicular or myelopathic complaints, no neurosurgical intervention anticipated at this juncture.  Will obtain baseline upright x-rays to monitor spinal alignment and for any pathologic fractures.  Discussed with patient general use of collar with cervical osteomyelitis/discitis but he was unable to tolerate this prior to transfer.  We can defer at this time but discussed limited neck movements.  If any changes noted on upright x-rays we will rediscuss need for collar.  Will defer additional MRI imaging at this time unless patient develops any exam changes or neurological complaints.   Primary team may consider repeat if concerns for source control or worsening cervical infection.  Ongoing antibiotics per infectious disease.  Cefazolin 2 g IV every 6 hours.   Pain control per primary team.  Patient does state current pain regimen provides good relief.  Patient may mobilize with physical and Occupational Therapy.  DVT prophylaxis: Bilateral SCDs.  Heparin gtt.   Ongoing medical management per primary team and subspecialties.    Neurosurgery review upright x-rays once completed.  Call in the interim with any questions or concerns.

## 2024-08-24 NOTE — ASSESSMENT & PLAN NOTE
Lab Results   Component Value Date    HGBA1C 6.8 (H) 07/05/2024       Recent Labs     08/23/24  0723 08/23/24  1150 08/23/24  1346 08/23/24  1536   POCGLU 190* 199* 284* 217*       Blood Sugar Average: Last 72 hrs:    Home regimen: metformin  Continue SSI coverage with QID accuchecks   Goal blood glucose 140-180 - increase to algorithm 4

## 2024-08-24 NOTE — CONSULTS
Consultation - Thoracic Surgery   Augustus Coffman 68 y.o. male MRN: [id number]  Unit/Bed#: PPHP-310-01 Encounter: [id number]      Assessment & Plan     Assessment:  68 y.o. male with septic shock from MSSA Bacteremia likely 2/2 diabetic ulcer of right foot. Patient found to have moderate pericardial effusion on 8/22 echo without echocardiographic signs of tamponade. Patient developing worsening work of breathing, tachycardia, and hypotension requiring pressor support    Plan:  - Follow-up formal echo this AM  - If evidence of tamponade, could consider pericardial window vs pericardiocentesis  - Continue to monitor hemodynamics  - Rest of care per ICU    History of Present Illness   Reason for Consult / Principal Problem: Pericardial effusion  HPI: uAgustus Coffamn is a 68 y.o. year old male with PMH significant for T2DM, diabetic foot wound, HTN who presented to [facility] ED 8/20 after an outpatient MRI revealed osteomyelitis/diskitis C5-C6. Patient has had neck pain for the past 6-7 weeks with no associated weakness. He had recently been admitted with MSSA bacteremia with a source of a right foot diabetic ulcer. During this current admission, he was restarted on IV ancef by ID. He developed new onset chest pain and afib. EKG with evidence of pericarditis and 8/22 echocardiogram revealed pericardial effusion without evidence of tamponade. Patient subsequently developed a worsening status with renal failure, tachycardia and hypotension requiring pressure support. Patient was transferred to [facility] on 8/24 for evaluation by thoracic surgery for possible pericardial window. Labs remarkable for WBC 22 from 28, elevated liver enzymes, K 6.1, Cr 3.12 up from baseline ~1 2d ago, BUN 55. Upon evaluation patient resting in bed with 6 L NC with increased work of breathing, on levo 6, vaso 0.04, and amio 1. Patient endorses chest pain and shortness of breath. He denies feeling his heart racing, palpations, cough,  lightheadedness.     Inpatient consult to Thoracic Surgery  Consult performed by: Natalee Marroquin MD  Consult ordered by: Ophelia Leon PA-C          Review of Systems   Constitutional:  Positive for diaphoresis. Negative for fever.   HENT:  Negative for congestion.    Eyes:  Negative for discharge.   Respiratory:  Positive for shortness of breath.    Cardiovascular:  Positive for chest pain.   Gastrointestinal:  Negative for abdominal pain.   Genitourinary:  Negative for difficulty urinating.   Skin:  Positive for color change.   Neurological:  Negative for dizziness.   Psychiatric/Behavioral:  Negative for agitation.        Historical Information   Past Medical History:   Diagnosis Date    Arthritis 2010's    Occasionally flares up    Cancer (HCC) May 2021    Still investigating    Chronic kidney disease     Diabetes mellitus (HCC)     Follicular lymphoma (HCC)     GERD (gastroesophageal reflux disease) June 2021    Noticed in an Endoscopy    Heart murmur May 2020    High cholesterol     Hypertension     Kidney stone 1980's    Have had since 1980's    Obesity Since Childhood     Past Surgical History:   Procedure Laterality Date    BUNIONECTOMY Right 11/24/2020    Procedure: RIGHT HAV CORRECTION,;  Surgeon: Niranjan Child DPM;  [facility];  Service: Podiatry    COLONOSCOPY      INCISION AND DRAINAGE OF WOUND Right 10/05/2016    Procedure: INCISION AND DRAINAGE (I&D) EXTREMITY;  Surgeon: Niranjan Child DPM;  Location[facility];  Service:     IR BIOPSY LYMPH NODE  07/08/2021    IR EMBOLIZATION (SPECIFY VESSEL OR SITE)  07/08/2021    IR PORT PLACEMENT  9/1/2022    PILONIDAL CYST EXCISION      NJ CORRECTION HAMMERTOE Right 02/19/2019    Procedure: THIRD HAMMER TOE CORRECTION;  Surgeon: Niranjan Child DPM;  [facility];  Service: Podiatry    NJ RMVL MATEO CTR VAD W/SUBQ PORT/ CTR/PRPH INSJ N/A 3/14/2023    Procedure: REMOVAL VENOUS PORT (PORT-A-CATH)IR;  Surgeon: Avelino Vázquez DO;  [facility];  Service: Interventional Radiology    TOE OSTEOTOMY Right 03/14/2017    Procedure: HAMMERTOE CORRECTION R 2 ;  Surgeon: Niranjan Child DPM;  [facility];  Service:     TOE OSTEOTOMY Left 11/24/2020    Procedure: LEFT HT CORRECTION TOE;  Surgeon: Niranjan Child DPM;  [facility];  Service: Podiatry    TONSILLECTOMY  1963     Social History     Substance and Sexual Activity   Alcohol Use Never     Social History     Substance and Sexual Activity   Drug Use Never     E-Cigarette/Vaping    E-Cigarette Use Never User      E-Cigarette/Vaping Substances    Nicotine No     THC No     CBD No     Flavoring No     Other No     Unknown No      Social History     Tobacco Use   Smoking Status Never   Smokeless Tobacco Never   Tobacco Comments    Never smoked but exposed to second hand smoke from birth until 1980's     Family History:   Family History   Problem Relation Age of Onset    Cancer Father         Leukemia       Meds/Allergies   current meds:   Current Facility-Administered Medications   Medication Dose Route Frequency    acetaminophen (Ofirmev) injection 1,000 mg  1,000 mg Intravenous Q6H SRIDHAR    acetaminophen (TYLENOL) tablet 650 mg  650 mg Oral Q6H PRN    amiodarone (CORDARONE) 900 mg in dextrose 5 % 500 mL infusion  1 mg/min Intravenous Continuous    calcium carbonate (TUMS) chewable tablet 1,000 mg  1,000 mg Oral Daily PRN    ceFEPime (MAXIPIME) 2,000 mg in dextrose 5 % 50 mL IVPB  2,000 mg Intravenous Q12H    chlorhexidine (PERIDEX) 0.12 % oral rinse 15 mL  15 mL Mouth/Throat Q12H SRIDHAR    colchicine (COLCRYS) tablet 0.6 mg  0.6 mg Oral BID    docusate sodium (COLACE) capsule 100 mg  100 mg Oral BID    heparin (porcine) 25,000 units in 0.45% NaCl 250 mL infusion (premix)  3-20 Units/kg/hr (Order-Specific) Intravenous Titrated    heparin (porcine) injection 2,000 Units  2,000 Units Intravenous Q6H PRN    heparin (porcine) injection 4,000 Units  4,000 Units Intravenous Q6H PRN    HYDROmorphone  (DILAUDID) injection 0.5 mg  0.5 mg Intravenous Q3H PRN    insulin regular (HumuLIN R,NovoLIN R) 1 Units/mL in sodium chloride 0.9 % 100 mL infusion  0.3-21 Units/hr Intravenous Titrated    methocarbamol (ROBAXIN) tablet 750 mg  750 mg Oral Q6H SRIDHAR    norepinephrine (LEVOPHED) 4 mg (STANDARD CONCENTRATION) IV in sodium chloride 0.9% 250 mL  1-30 mcg/min Intravenous Titrated    ondansetron (ZOFRAN) injection 4 mg  4 mg Intravenous Q6H PRN    oxyCODONE (ROXICODONE) immediate release tablet 10 mg  10 mg Oral Q4H PRN    oxyCODONE (ROXICODONE) IR tablet 5 mg  5 mg Oral Q4H PRN    polyethylene glycol (MIRALAX) packet 17 g  17 g Oral Daily    predniSONE tablet 30 mg  30 mg Oral Daily    senna (SENOKOT) tablet 8.6 mg  1 tablet Oral Daily    sodium chloride 0.9 % infusion  100 mL/hr Intravenous Continuous    Sodium Zirconium Cyclosilicate (Lokelma) 10 g  10 g Oral TID    vancomycin (VANCOCIN) 1,250 mg in sodium chloride 0.9 % 250 mL IVPB  12.5 mg/kg (Adjusted) Intravenous Daily PRN    vasopressin (PITRESSIN) 20 Units in sodium chloride 0.9 % 100 mL infusion  0.04 Units/min Intravenous Continuous    and PTA meds:   Prior to Admission Medications   Prescriptions Last Dose Informant Patient Reported? Taking?   Cholecalciferol 100 MCG (4000 UT) CAPS  Self No No   Sig: Take 1 capsule (4,000 Units total) by mouth in the morning   Diclofenac Sodium (VOLTAREN) 1 %   No No   Sig: Apply 2 g topically 4 (four) times a day   Patient not taking: Reported on 8/12/2024   Diclofenac Sodium (VOLTAREN) 1 %   No No   Sig: Apply 2 g topically 4 (four) times a day For pain to affected area   Patient not taking: Reported on 8/12/2024   Diclofenac Sodium (VOLTAREN) 1 %   No No   Sig: Apply 2 g topically 4 (four) times a day   Patient not taking: Reported on 8/20/2024   Empagliflozin (Jardiance) 25 MG TABS  Self No No   Sig: TAKE 1 TABLET BY MOUTH EVERY DAY IN THE MORNING   Multiple Vitamins-Minerals (Centrum Silver 50+Men) TABS  Self Yes No    allopurinol (ZYLOPRIM) 300 mg tablet  Self No No   Sig: Take 1 tablet (300 mg total) by mouth daily   amLODIPine (NORVASC) 10 mg tablet  Self No No   Sig: Take 1 tablet (10 mg total) by mouth daily   aspirin 81 mg chewable tablet  Self Yes No   Sig: Chew 81 mg daily   cyclobenzaprine (FLEXERIL) 10 mg tablet   No No   Sig: Take 1 tablet (10 mg total) by mouth 3 (three) times a day as needed for muscle spasms   Patient not taking: Reported on 8/20/2024   hydroCHLOROthiazide 25 mg tablet  Self No No   Sig: Take 1 tablet (25 mg total) by mouth daily   ketorolac (TORADOL) 10 mg tablet   No No   Sig: Take 0.5 tablets (5 mg total) by mouth every 6 (six) hours as needed for moderate pain for up to 5 days   Patient not taking: Reported on 8/12/2024   lidocaine (LMX) 4 % cream   No No   Sig: Apply topically as needed for mild pain   Patient not taking: Reported on 8/20/2024   lidocaine (Lidoderm) 5 %  Self No No   Sig: Apply 1 patch topically over 12 hours daily for 14 days Remove & Discard patch within 12 hours or as directed by MD   losartan (COZAAR) 100 MG tablet  Self No No   Sig: Take 1 tablet (100 mg total) by mouth daily   metFORMIN (GLUCOPHAGE) 500 mg tablet  Self No No   Sig: TAKE 2 TABLETS BY MOUTH TWICE A DAY WITH FOOD   metoprolol succinate (TOPROL-XL) 100 mg 24 hr tablet  Self No No   Sig: Take 1 tablet (100 mg total) by mouth every evening   oxyCODONE (Roxicodone) 5 immediate release tablet  Self No No   Sig: Take 1 tablet (5 mg total) by mouth every 6 (six) hours as needed for moderate pain for up to 7 doses Max Daily Amount: 20 mg   Patient not taking: Reported on 8/12/2024   rosuvastatin (CRESTOR) 40 MG tablet  Self No No   Sig: Take 1 tablet (40 mg total) by mouth every evening   tiZANidine (ZANAFLEX) 4 mg tablet   No No   Sig: Take 1 tablet (4 mg total) by mouth 3 (three) times a day for 14 days      Facility-Administered Medications: None     Allergies   Allergen Reactions    Lisinopril Cough  "      Objective   Vitals: Blood pressure 169/73, pulse (!) 115, temperature 97.8 °F (36.6 °C), temperature source Oral, resp. rate (!) 23, height 6' 3\" (1.905 m), weight 124 kg (272 lb 4.3 oz), SpO2 94%.    Invasive Devices       Central Venous Catheter Line  Duration             CVC Central Lines 08/23/24 Triple 20cm <1 day              Peripheral Intravenous Line  Duration             Peripheral IV 08/20/24 Left;Proximal;Ventral (anterior) Forearm 3 days    Peripheral IV 08/20/24 Right;Ventral (anterior) Forearm 3 days    Peripheral IV 08/23/24 Left Antecubital <1 day              Arterial Line  Duration             Arterial Line 08/23/24 Radial <1 day              Drain  Duration             Urethral Catheter 1 day                    Physical Exam  Constitutional:       Appearance: He is diaphoretic.   HENT:      Head: Normocephalic.      Right Ear: External ear normal.      Left Ear: External ear normal.      Mouth/Throat:      Pharynx: Oropharynx is clear.   Eyes:      Extraocular Movements: Extraocular movements intact.   Cardiovascular:      Rate and Rhythm: Tachycardia present.   Pulmonary:      Comments: Increased work of breathing  Abdominal:      General: Abdomen is flat.      Palpations: Abdomen is soft.   Musculoskeletal:      Cervical back: Rigidity present.      Right lower leg: No edema.      Left lower leg: No edema.   Skin:     General: Skin is warm.      Findings: Rash (B/L LE venous stasis) present.   Neurological:      General: No focal deficit present.      Mental Status: He is alert. Mental status is at baseline.   Psychiatric:         Mood and Affect: Mood normal.         Lab Results: CBC:   Lab Results   Component Value Date    WBC 22.20 (H) 08/24/2024    HGB 10.4 (L) 08/24/2024    HCT 34.2 (L) 08/24/2024    MCV 83 08/24/2024     08/24/2024    RBC 4.12 08/24/2024    MCH 25.2 (L) 08/24/2024    MCHC 30.4 (L) 08/24/2024    RDW 16.9 (H) 08/24/2024    MPV 9.9 08/24/2024    NRBC 0 " "08/24/2024   , CMP:   Lab Results   Component Value Date    SODIUM 131 (L) 08/24/2024    CL 94 (L) 08/24/2024    CO2 23 08/24/2024    BUN 55 (H) 08/24/2024    CREATININE 3.12 (H) 08/24/2024    CALCIUM 9.2 08/24/2024    AST 2,452 (H) 08/24/2024     (H) 08/24/2024    ALKPHOS 179 (H) 08/24/2024    EGFR 19 08/24/2024   , Magnesium: No components found for: \"MAG\"  Imaging Studies: I have personally reviewed pertinent reports.    EKG, Pathology, and Other Studies: I have personally reviewed pertinent reports.        Code Status: Level 1 - Full Code  Advance Directive and Living Will:      Power of :    POLST:      Counseling / Coordination of Care  Total floor / unit time spent today 30 minutes. Greater than 50% of total time was spent with the patient and / or family counseling and / or coordination of care.  "

## 2024-08-24 NOTE — ASSESSMENT & PLAN NOTE
8/22 Echo: Limited echo for assessment of endocarditis.  The patient's aortic valve is difficult to assess due to calcification but there is no new significant regurgitation.  The mitral valve has hyperechoic thickening at the tips consistent with most likely calcification, these were seen on the echocardiograms in July.  The tricuspid valve similarly was not well-visualized but no new regurgitation.  The pulmonary valve was not well-visualized. Off note patient has new moderate pericardial effusion without specific echocardiographic signs of tamponade  Patient with symptoms consistent with pericarditis    Plan:  Cardiology consult - consider pericardiocentesis vs window to culture fluid  Colchicine 0.6 mg BID  Unable to initiate NSAIDs 2/2 FINA  Prednisone 30 mg daily  Close hemodynamic monitoring

## 2024-08-24 NOTE — PROGRESS NOTES
Augustus Coffman is a 68 y.o. male who is currently ordered Vancomycin IV with management by the Pharmacy Consult service.  Relevant clinical data and objective / subjective history reviewed.  Vancomycin Assessment:  Indication and Goal AUC/Trough: Bone/joint infection (goal -600, trough >10); Soft tissue (goal -600, trough >10), -600, trough >10  Clinical Status:  critically ill  Micro:     Renal Function:  SCr: 3.12 mg/dL  CrCl: 30 mL/min  Renal replacement: Not on dialysis  Days of Therapy: 2  Current Dose: 1250 mg IV daily prn if random level < 15  Vancomycin Plan:  New Dosin mg IV if random level < 15  Next Level: 24 at 0600  Renal Function Monitoring: Daily BMP and UOP  Pharmacy will continue to follow closely for s/sx of nephrotoxicity, infusion reactions and appropriateness of therapy.  BMP and CBC will be ordered per protocol. We will continue to follow the patient’s culture results and clinical progress daily.    Bisi Jensen, Pharmacist

## 2024-08-24 NOTE — CONSULTS
Consultation - Cardiology Team 2  Augustus Coffman 68 y.o. male MRN: [id number]  Unit/Bed#: PPHP-310-01 Encounter: [id number]      Inpatient consult to Cardiology  Consult performed by: Courtney Strickland MD  Consult ordered by: Ophelia Leon PA-C        PCP: Gwen Lazo DO   Outpatient Cardiologist: Shelia Felipe MD    History of Present Illness   Physician Requesting Consult: Sylvain Torres DO  Reason for Consult / Principal Problem: Pericarditis and pericardial effusion    Assessment and Plan      Assessment:  Acute pericarditis with moderate pericardial effusion  Paroxysmal atrial fibrillation  Hypotension  MSSA bacteremia  Osteomyelitis and cervical discitis  Moderate aortic stenosis  Hyperkalemia  Acute kidney injury  Hypertension  Diabetes mellitus  Diabetic ulcer of right foot    Plan:  -Echo personally reviewed, appears to be a moderate sized anterior pericardial effusion. There does not appear to be RV diastolic collapse, RA inversion or respiratory inflow variation. EF appears to be around 55-60% on my impression. Will discuss case with Dr. Ruffin and our interventional team for possibility of drainage. Will also speak with ID team to see if draining it would help with source control.   -Recommend another limited TTE in 48 hours and will need LUCRECIA eventually to rule out endocarditis  -For atrial fibrillation, continue amiodarone but can consider switching to amiodarone oral loading (400 mg BID). Avoid beta blockers for now since patient is on pressor support. Continue on heparin drip.   -Use of NSAIDs precluded by patients current renal function  -Currently on colchicine, may have to consider renal dosing for this due to patients current renal function. Will defer dosing to nephrology team. We would recommend avoiding steroid use if possible as this may increase the risk of recurrent pericarditis.  -Monitor on telemetry and ECG monitoring for hyperkalemia    Initial ECG:      Case discussed and reviewed  with Dr. Ruffin. Please wait for final recommendations from Dr. Ruffin. Thank you for involving us in the care of your patient.    Subjective     HPI:   68-year-old male with history of diabetes mellitus type 2, diabetic foot wound, hypertension who presented to [facility] on 08/20 with an outpatient MRI indicated of osteomyelitis and discitis.  Patient was recently admitted for MSSA bacteremia for the source being presumed to be a right foot diabetic ulcer.  He was started on IV antibiotics per infectious disease over there and during the course of his hospitalization, patient was noted to have new onset chest pain and atrial fibrillation with EKG evidence of pericarditis.  A subsequent echocardiogram revealed pericardial effusion without evidence of tamponade and no evidence of vegetation or new valvular regurgitation.  He was also noted to be hypotensive due to atrial fibrillation with RVR and was started on amiodarone. Due to elevated WBC, patient was eventually switched to vancomycin and cefepime. Eventually patient was transferred to [facility] and cardiology was consulted for recommendations and management for pericardial effusion.    Review of Systems  CONSTITUTIONAL: Denies any fever, chills, rigors, and weight loss  HEENT: No earache or tinnitus, denies hearing loss or visual disturbances  CARDIOVASCULAR: As noted in HPI  RESPIRATORY: Denies any cough, hemoptysis, shortness of breath or dyspnea on exertion  GASTROINTESTINAL: Denies any diarrhea or constipation  GENITOURINARY: No problems with urination, denies any hematuria or dysuria  NEUROLOGIC: No dizziness or vertigo, denies headaches   MUSCULOSKELETAL: Denies any muscle or joint pain   SKIN: Denies skin rashes or itching   ENDOCRINE: Denies excessive thirst, denies intolerance to heat or cold      Objective     Physical Exam  Vitals reviewed.   Constitutional:       Comments: Seen sitting up in bed, on nasal cannula.   Patient's family nearby   HENT:      Head: Normocephalic.   Cardiovascular:      Rate and Rhythm: Normal rate. Rhythm irregular.      Pulses: Normal pulses.      Heart sounds: Normal heart sounds.   Pulmonary:      Effort: Pulmonary effort is normal.      Comments: Diminished breath sounds in lower lung fields  Skin:     General: Skin is warm and dry.      Capillary Refill: Capillary refill takes less than 2 seconds.   Neurological:      Mental Status: He is alert and oriented to person, place, and time.         Vitals:  Temp:  [97.5 °F (36.4 °C)-98.9 °F (37.2 °C)] 97.7 °F (36.5 °C)  HR:  [100-149] 112  Resp:  [8-39] 30  BP: ()/(50-89) 146/70  Arterial Line BP: ()/(44-76) 128/74  Orthostatic Blood Pressures      Flowsheet Row Most Recent Value   Blood Pressure 146/70 filed at 08/24/2024 0634            Intake and Outputs:  I/O         08/22 0701  08/23 0700 08/23 0701  08/24 0700 08/24 0701  08/25 0700    I.V. (mL/kg)  1445.8 (11.8)     IV Piggyback  1245.8     Total Intake(mL/kg)  2691.7 (21.9)     Urine (mL/kg/hr)  635     Total Output  635     Net  +2056.7                    Labs & Results:          Results from last 7 days   Lab Units 08/24/24  0620 08/24/24  0201 08/23/24  2155   POTASSIUM mmol/L 6.1* 6.0* 6.0*   CO2 mmol/L 23 23 22   CHLORIDE mmol/L 94* 93* 93*   BUN mg/dL 55* 52* 49*   CREATININE mg/dL 3.12* 3.04* 3.02*   EGFR ml/min/1.73sq m 19 20 20     Results from last 7 days   Lab Units 08/24/24  0403 08/23/24  1939 08/23/24  0906   AST U/L 2,452* 162* 75*   ALT U/L 418* 49 34   ALK PHOS U/L 179* 181* 187*   TOTAL PROTEIN g/dL 6.9 7.4 7.7   ALBUMIN g/dL 3.3* 3.3* 3.5   TOTAL BILIRUBIN mg/dL 0.71 0.61 0.63     Results from last 7 days   Lab Units 08/22/24  0926   PROTIME seconds 16.0*   INR  1.21*     Results from last 7 days   Lab Units 08/24/24  0403 08/23/24  1939 08/23/24  0632   HEMOGLOBIN g/dL 10.4* 10.5* 11.2*   HEMATOCRIT % 34.2* 34.6* 36.3*   PLATELETS Thousands/uL 383 430* 356            Cardiac Imaging/Monitoring     Telemetry:   Personally reviewed by Courtney Strickland MD: atrial fibrillation with rates of 100-110s.    Previous Cath/PCI:  No results found for this or any previous visit.    Prior Echo:   Results for orders placed during the hospital encounter of 07/05/24    Echo complete w/ contrast if indicated    Interpretation Summary    Left Ventricle: Left ventricular cavity size is normal. Wall thickness is moderately increased. There is moderate concentric hypertrophy. The left ventricular ejection fraction is 62%. Systolic function is normal. Wall motion is normal. Diastolic function is mildly abnormal, consistent with grade I (abnormal) relaxation.    Aortic Valve: The aortic valve is trileaflet. The leaflets are moderately thickened. The leaflets are moderately calcified. There is moderately reduced mobility. There is moderate to severe stenosis.    Mitral Valve: There is moderate thickening of the anterior leaflet and posterior leaflet. There is moderate annular calcification.    Aorta: The aortic root is normal in size. The ascending aorta is moderately dilated.      Results for orders placed during the hospital encounter of 06/07/23    Echo complete w/ contrast if indicated    Interpretation Summary    Left Ventricle: Left ventricular cavity size is normal. Wall thickness is mildly increased. The left ventricular ejection fraction is 60%. Systolic function is normal. Wall motion is normal. Diastolic function is mildly abnormal, consistent with grade I (abnormal) relaxation.    Left Atrium: The atrium is mildly dilated.    Right Atrium: The atrium is mildly dilated.    Aortic Valve: The aortic valve was not well visualized. There is mild stenosis. The aortic valve peak velocity is 2.73 m/s. The aortic valve mean gradient is 15 mmHg. The aortic valve area is 1.76 cm2.    Mitral Valve: There is moderate calcification. There is moderate annular calcification.    Aorta: The aortic root is  mildly dilated. The ascending aorta is moderately dilated. The aortic root exhibited mild fibrocalcific change. The aortic root is 4.10 cm. The ascending aorta is 4.5 cm.      Prior Stress Test:  Results for orders placed during the hospital encounter of 09/13/22    NM myocardial perfusion spect (rx stress and/or rest)    Interpretation Summary    Stress ECG: No ST deviation is noted. The ECG was negative for ischemia.    Perfusion Defect Conclusion: There is no evidence of transient ischemic dilation (TID).  TID index 0.95.    Perfusion: There is a left ventricular perfusion defect that is medium in size present in the basal to mid inferior location(s) that is fixed.  This clears with prone imaging suggesting diaphragmatic attenuation.  No reversible defect suggesting ischemia identified.    Stress Function: Left ventricular function post-stress is normal. Post-stress ejection fraction is 51 %.    Stress Combined Conclusion: The ECG and SPECT imaging portions of the stress study are concordant with no evidence of stress induced myocardial ischemia. Left ventricular perfusion is normal.      Recent Device Check:  No results found for any visits on 08/23/24.     EKG:  Encounter Date: 08/20/24   ECG 12 lead   Result Value    Ventricular Rate 101    Atrial Rate 101    VA Interval 182    QRSD Interval 72    QT Interval 330    QTC Interval 427    P Axis 62    QRS Axis -4    T Wave Axis 54    Narrative    Sinus tachycardia  Acute pericarditis  When compared with ECG of 23-AUG-2024 13:42, (unconfirmed)  No significant change was found  Confirmed by Eriberto Lopez ([id number]) on 8/23/2024 2:25:52 PM       Courtney Strickland MD  Cardiovascular Disease Fellow, FY-1  [facility]

## 2024-08-24 NOTE — ASSESSMENT & PLAN NOTE
Baseline Cr 1-1.2  Developed FINA 8/23 with associated hyperkalemia  Treated medically, received IVF as well as lasix 80 mg IV with improvement     Plan:  Maintain avilez  Avoid nephrotoxins  Strict I/Os  Monitor renal function/Cr every 6 hours until plateau/improvement

## 2024-08-24 NOTE — ASSESSMENT & PLAN NOTE
Lab Results   Component Value Date    HGBA1C 6.8 (H) 07/05/2024       Recent Labs     08/23/24  2048 08/23/24  2314 08/24/24  0332 08/24/24  0357   POCGLU 222* 207* 307* 213*         Blood Sugar Average: Last 72 hrs:  (P) 237.25

## 2024-08-24 NOTE — ASSESSMENT & PLAN NOTE
Lab Results   Component Value Date    HGBA1C 6.8 (H) 07/05/2024       Recent Labs     08/23/24  0723 08/23/24  1150 08/23/24  1346 08/23/24  1536   POCGLU 190* 199* 284* 217*       Blood Sugar Average: Last 72 hrs:

## 2024-08-24 NOTE — ASSESSMENT & PLAN NOTE
Developed new onset hypotension in the setting of afib with RVR  Levophed - titrate to maintain MAP > 65 mmHg  Continue vasopressin 0.04 units/min  Attempt rate control with amiodarone  Maintain jose/central line

## 2024-08-24 NOTE — H&P
[facility]  H&P  Name: Augustus Coffman 68 y.o. male I MRN: [id number]  Unit/Bed#: PPHP-310-01 I Date of Admission: 8/23/2024   Date of Service: 8/23/2024 I Hospital Day: 0      Assessment & Plan   * MSSA bacteremia  Assessment & Plan  Etiology likely 2/2 right foot ulcer  Complicated by cervical discitis  Echo without evidence of endocarditis    Plan:  ID consulted, appreciate their recommendations  Plan for ancef x 6 weeks  Repeat blood cultures now given episodes of diaphoresis/chills  Given history and rise in WBC, will broaden to vancomycin/cefepime  Check MRSA swab  Check UA with micro  Consider LUCRECIA given poor visualization of valves    Hypotension  Assessment & Plan  Developed new onset hypotension in the setting of afib with RVR  Started on levophed - titrate to maintain MAP > 65 mmHg  Attempt rate control with amiodarone  Consider additional IVF  Maintain jose/central line    New onset a-fib (HCC)  Assessment & Plan  Developed new onset afib 8/21/8/22  Initiated on heparin infusion and metoprolol  Unable to tolerate metoprolol 2/2 hypotension  Received amiodarone bolus and initiated on infusion    Plan:  Repeat amiodarone bolus now  Continue amiodarone infusion  Continue heparin drip    Acute pericarditis  Assessment & Plan  8/22 Echo: Limited echo for assessment of endocarditis.  The patient's aortic valve is difficult to assess due to calcification but there is no new significant regurgitation.  The mitral valve has hyperechoic thickening at the tips consistent with most likely calcification, these were seen on the echocardiograms in July.  The tricuspid valve similarly was not well-visualized but no new regurgitation.  The pulmonary valve was not well-visualized. Off note patient has new moderate pericardial effusion without specific echocardiographic signs of tamponade  Patient with symptoms consistent with pericarditis    Plan:  Cardiology consult - consider  pericardiocentesis vs window to culture fluid  Colchicine 0.6 mg BID  Unable to initiate NSAIDs 2/2 FINA  Start prednisone 30 mg daily  Close hemodynamic monitoring    Acute respiratory insufficiency  Assessment & Plan  Currently requiring 6L NC and is tachypneic  Appears related to low lung volumes 2/2 pain  Wean O2 as tolerated to maintain Spo2 > 92%  Consider BiPAP for rest    FINA (acute kidney injury) on CKD stage 2(HCC)  Assessment & Plan  Baseline Cr 1-1.2  Developed FINA 8/23 with associated hyperkalemia  Treated medically, received IVF as well as lasix 80 mg IV with improvement     Plan:  Maintain avilez  Avoid nephrotoxins  Strict I/Os  Monitor renal function/Cr every 6 hours until plateau/improvement    Cervical discitis  Assessment & Plan  8/20 MRI C-Spine: Imaging findings suspicious for discitis osteomyelitis at C5-C6. 3 mm epidural phlegmon/small abscess is also suspected. There is moderate resultant central stenosis and mild mass effect on the cord  Neurosurgery consulted - recommended C-collar at all times but patient unable to tolerate  Recommending 6 weeks IV antibiotics and follow-up repeat imaging  Q4h neuro checks    Hyperkalemia  Assessment & Plan  Developed FINA with hyperkalemia  Received medical therapy with insulin, calcium, bicarb, lasix  Continue lokelma  Trend BMP q6h    Diabetic ulcer of right foot (HCC)  Assessment & Plan  Follows as an outpatient with wound care  Likely source of MSSA bacteremia   Continue local wound care    Type 2 diabetes mellitus (HCC)  Assessment & Plan  Lab Results   Component Value Date    HGBA1C 6.8 (H) 07/05/2024       Recent Labs     08/23/24  0723 08/23/24  1150 08/23/24  1346 08/23/24  1536   POCGLU 190* 199* 284* 217*       Blood Sugar Average: Last 72 hrs:    Home regimen: metformin  Continue SSI coverage with QID accuchecks   Goal blood glucose 140-180 - increase to algorithm 4    History of hypertension  Assessment & Plan  Hx HTN on norvasc, HCTZ,  losartan, Toprol-XL  All currently on hold given hypotension  Continue statin  Aspirin on hold - discuss restarting in AM    Diabetic peripheral neuropathy (HCC)  Assessment & Plan  Lab Results   Component Value Date    HGBA1C 6.8 (H) 07/05/2024       Recent Labs     08/23/24  0723 08/23/24  1150 08/23/24  1346 08/23/24  1536   POCGLU 190* 199* 284* 217*       Blood Sugar Average: Last 72 hrs:               History of Present Illness     HPI: Augustus Coffman is a 68 y.o. with PMH significant for DM2, diabetic foot wound, HTN who presented to [facility] ED 8/20 after an outpatient MRI revealed osteomyelitis/diskitis. Patient has had neck pain for the past 6-7 weeks with no associated weakness. He had recently been admitted with MSSA bacteremia with a source of a right foot diabetic ulcer. During this current admission, he was restarted on IV ancef by ID. He developed new onset chest pain and afib. EKG with evidence of pericarditis and echocardiogram revealed pericardial effusion without evidence of tamponade. There was also no evidence of vegetation or new valvular regurgitation. This morning's labs were concerning for new FINA and hyperkalemia. He also developed afib with RVR and associated hypotension. He was given an amiodarone bolus and started on an effusion. He was upgraded to ICU with subsequent transfer to [facility] for further management. He arrives on levophed 5 mcg/min.    History obtained from chart review and the patient.  Review of Systems: Review of Systems   Constitutional:  Positive for diaphoresis.   Respiratory:  Positive for shortness of breath. Negative for cough.    Cardiovascular:  Positive for chest pain. Negative for palpitations and leg swelling.   Gastrointestinal:  Negative for abdominal pain, diarrhea and nausea.   Genitourinary:  Positive for decreased urine volume. Negative for difficulty urinating.   Musculoskeletal:  Positive for neck pain.   Neurological:  Negative for weakness,  numbness and headaches.        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)  June 2021: GERD (gastroesophageal reflux disease)      Comment:  Noticed in an Endoscopy  May 2020: Heart murmur  No date: High cholesterol  No date: Hypertension  1980's: Kidney stone      Comment:  Have had since 1980's  Since Childhood: Obesity Past Surgical History:  11/24/2020: BUNIONECTOMY; Right      Comment:  Procedure: RIGHT HAV CORRECTION,;  Surgeon: Niranjan Child DPM;  Location: [facility];  Service: Podiatry  No date: COLONOSCOPY  10/05/2016: INCISION AND DRAINAGE OF WOUND; Right      Comment:  Procedure: INCISION AND DRAINAGE (I&D) EXTREMITY;                 Surgeon: Niranjan Child DPM;  Location: [facility];                 Service:   07/08/2021: IR BIOPSY LYMPH NODE  07/08/2021: IR EMBOLIZATION (SPECIFY VESSEL OR SITE)  9/1/2022: IR PORT PLACEMENT  No date: PILONIDAL CYST EXCISION  02/19/2019: SD CORRECTION HAMMERTOE; Right      Comment:  Procedure: THIRD HAMMER TOE CORRECTION;  Surgeon: Niranjan Child DPM;  Location: [facility];  Service: Podiatry  3/14/2023: SD RMVL MATEO CTR VAD W/SUBQ PORT/ CTR/PRPH INSJ; N/A      Comment:  Procedure: REMOVAL VENOUS PORT (PORT-A-CATH)IR;                 Surgeon: Avelino Vázquez DO;  [facility];               Service: Interventional Radiology  03/14/2017: TOE OSTEOTOMY; Right      Comment:  Procedure: HAMMERTOE CORRECTION R 2 ;  Surgeon: Niranjan Child DPM;  Location: [facility];  Service:   11/24/2020: TOE OSTEOTOMY; Left      Comment:  Procedure: LEFT HT CORRECTION TOE;  Surgeon: Niranjan Child DPM;  Location: [facility];  Service: Podiatry  1963: TONSILLECTOMY   Current Outpatient Medications   Medication Instructions    allopurinol (ZYLOPRIM) 300 mg, Oral, Daily    amLODIPine (NORVASC) 10 mg, Oral, Daily    aspirin 81 mg, Oral, Daily    Cholecalciferol 4,000 Units, Oral, Daily    cyclobenzaprine (FLEXERIL) 10 mg, Oral, 3 times daily PRN    Diclofenac Sodium (VOLTAREN) 2 g, Topical, 4 times daily    Diclofenac Sodium (VOLTAREN) 2 g, Topical, 4 times daily, For pain to affected area    Diclofenac Sodium (VOLTAREN) 2 g, Topical, 4 times daily    hydroCHLOROthiazide 25 mg, Oral, Daily    Jardiance 25 mg, Oral, Every morning    ketorolac (TORADOL) 5 mg, Oral, Every 6 hours PRN    lidocaine (Lidoderm) 5 % 1 patch, Topical, Daily, Remove & Discard patch within 12 hours or as directed by MD    lidocaine (LMX) 4 % cream Topical, As needed    losartan (COZAAR) 100 mg, Oral, Daily    metFORMIN (GLUCOPHAGE) 1,000 mg, Oral, 2 times daily with meals    metoprolol succinate (TOPROL-XL) 100 mg, Oral, Every evening    Multiple Vitamins-Minerals (Centrum Silver 50+Men) TABS No dose, route, or frequency recorded.    oxyCODONE (ROXICODONE) 5 mg, Oral, Every 6 hours PRN    rosuvastatin (CRESTOR) 40 mg, Oral, Every evening    tiZANidine (ZANAFLEX) 4 mg, Oral, 3 times daily    Allergies   Allergen Reactions    Lisinopril Cough      Social History     Tobacco Use    Smoking status: Never    Smokeless tobacco: Never    Tobacco comments:     Never smoked but exposed to second hand smoke from birth until 1980's   Vaping Use    Vaping status: Never Used   Substance Use Topics    Alcohol use: Never    Drug use: Never    Family History   Problem Relation Age of Onset    Cancer Father         Leukemia          Objective                            Vitals I/O      Most Recent Min/Max in 24hrs   Temp 97.5 °F (36.4 °C) Temp  Min: 97.5 °F (36.4 °C)  Max: 100 °F (37.8 °C)   Pulse (!) 130 Pulse  Min: 95  Max: 149   Resp (!) 35 Resp  Min: 16  Max: 35   BP  101/79 BP  Min: 73/50  Max: 113/70   O2 Sat 94 % SpO2  Min: 91 %  Max: 98 %    No intake or output data in the 24 hours ending 08/23/24 2045    Diet Perico/CHO Controlled; Consistent Carbohydrate Diet Level 2 (5 carb servings/75 grams CHO/meal); Potassium 2 GM    Invasive Monitoring           Physical Exam   Physical Exam  Skin:     General: Skin is warm.      Capillary Refill: Capillary refill takes 2 to 3 seconds.   HENT:      Mouth/Throat:      Mouth: Mucous membranes are moist.   Neck:      Vascular: Central line present.      Comments: Pain with range of motion  Cardiovascular:      Rate and Rhythm: Tachycardia present. Rhythm irregularly irregular.      Pulses: Decreased pulses.      Heart sounds:      Friction rub present.   Musculoskeletal:      Right lower leg: No edema.      Left lower leg: No edema.   Abdominal: General: There is distension.     Palpations: Abdomen is soft.      Tenderness: There is no abdominal tenderness.   Constitutional:       Appearance: He is ill-appearing and diaphoretic.   Pulmonary:      Effort: Tachypnea present.      Breath sounds: Rhonchi present.      Comments: Poor inspiratory effort  Neurological:      General: No focal deficit present.      Mental Status: He is alert and oriented to person, place and time.      Sensory: Sensation is intact.      Motor: Strength full and intact in all extremities.   Genitourinary/Anorectal:  Villa present.          Diagnostic Studies      EKG: Diffuse ST elevations  Imaging: CXR, Echo, MRI I have personally reviewed pertinent reports.       Medications:  Scheduled PRN   acetaminophen, 1,000 mg, Q6H SRIDHAR  amiodarone 150 mg in dextrose 5 % 100 mL IV bolus, 150 mg, Once  [START ON 8/24/2024] atorvastatin, 80 mg, Daily With Dinner  cefepime, 2,000 mg, Q12H  chlorhexidine, 15 mL, Q12H SRIDHAR  colchicine, 0.6 mg, BID  docusate sodium, 100 mg, BID  [START ON 8/24/2024] insulin lispro, 2-12 Units, TID AC  insulin lispro, 2-12 Units, HS  methocarbamol, 750  mg, Q6H SRIDHAR  predniSONE, 30 mg, Daily  [START ON 8/24/2024] senna, 1 tablet, Daily  [START ON 8/24/2024] Sodium Zirconium Cyclosilicate, 10 g, Daily  vancomycin, 20 mg/kg, Once      acetaminophen, 650 mg, Q6H PRN  calcium carbonate, 1,000 mg, Daily PRN  heparin (porcine), 2,000 Units, Q6H PRN  heparin (porcine), 4,000 Units, Q6H PRN  HYDROmorphone, 0.5 mg, Q3H PRN  metoprolol, 5 mg, Q6H PRN  ondansetron, 4 mg, Q6H PRN  oxyCODONE, 10 mg, Q4H PRN  oxyCODONE, 5 mg, Q4H PRN       Continuous    amiodarone (CORDARONE) 900 mg in dextrose 5 % 500 mL infusion, 1 mg/min   Followed by  [START ON 8/24/2024] amiodarone (CORDARONE) 900 mg in dextrose 5 % 500 mL infusion, 0.5 mg/min  heparin (porcine), 3-20 Units/kg/hr (Order-Specific)  multi-electrolyte, 100 mL/hr, Last Rate: 100 mL/hr (08/23/24 2044)  norepinephrine, 1-30 mcg/min, Last Rate: 5 mcg/min (08/23/24 1958)         Labs:    CBC    Recent Labs     08/23/24  0632 08/23/24 1939   WBC 25.32* 28.03*   HGB 11.2* 10.5*   HCT 36.3* 34.6*    430*     BMP    Recent Labs     08/23/24  1634 08/23/24  1939   SODIUM 131* 132*   K 5.5* 5.7*   CL 94* 92*   CO2 21 25   AGAP 16* 15*   BUN 47* 46*   CREATININE 2.91* 2.92*   CALCIUM 9.9 10.0       Coags    Recent Labs     08/22/24  0926 08/22/24  1552 08/23/24  1214 08/23/24  1634   INR 1.21*  --   --   --    PTT 40*   < > 53* 59*    < > = values in this interval not displayed.        Additional Electrolytes  Recent Labs     08/23/24  1634 08/23/24 1939   MG 2.2 2.4   PHOS 8.0* 7.2*   CAIONIZED  --  1.19          Blood Gas    No recent results  Recent Labs     08/23/24 1939   PHVEN 7.270*   [id number] 60.3*   PO2VEN 33.0*   [id number] 27.1   BEVEN -0.7   [id number] 53.3*    LFTs  Recent Labs     08/23/24  0906 08/23/24 1939   ALT 34 49   AST 75* 162*   ALKPHOS 187* 181*   ALB 3.5 3.3*   TBILI 0.63 0.61       Infectious  No recent results  Glucose  Recent Labs     08/22/24  0550 08/23/24  0906 08/23/24  1634 08/23/24 1939   GLUC 210*  189* 231* 220*         Critical Care Time Statement: Upon my evaluation, this patient had a high probability of imminent or life-threatening deterioration due to hypotension, afib with RVR, FINA, hyperkalemia, which required my direct attention, intervention, and personal management.  I spent a total of 45 minutes directly providing critical care services, including interpretation of complex medical databases, evaluating for the presence of life-threatening injuries or illnesses, management of organ system failure(s) , complex medical decision making (to support/prevent further life-threatening deterioration)., interpretation of hemodynamic data, titration of vasoactive medications, and titration of continuous IV medications (drips). This time is exclusive of procedures, teaching, family meetings, and any prior time recorded by providers other than myself.      Discussed plan with Dr. Torres via Secure Chat  Made cardiology fellow aware of patient in case of acute decompensation related to pericardial effusion  Anticipated Length of Stay is > 2 midnights  Breonna Santana PA-C

## 2024-08-24 NOTE — PLAN OF CARE
Problem: Prexisting or High Potential for Compromised Skin Integrity  Goal: Skin integrity is maintained or improved  Description: INTERVENTIONS:  - Identify patients at risk for skin breakdown  - Assess and monitor skin integrity  - Assess and monitor nutrition and hydration status  - Monitor labs   - Assess for incontinence   - Turn and reposition patient  - Assist with mobility/ambulation  - Relieve pressure over bony prominences  - Avoid friction and shearing  - Provide appropriate hygiene as needed including keeping skin clean and dry  - Evaluate need for skin moisturizer/barrier cream  - Collaborate with interdisciplinary team   - Patient/family teaching  - Consider wound care consult   8/24/2024 1356 by Alexander Syed RN  Outcome: Progressing  8/24/2024 1354 by Alexander Syed RN  Outcome: Progressing     Problem: NEUROSENSORY - ADULT  Goal: Achieves stable or improved neurological status  Description: INTERVENTIONS  - Monitor and report changes in neurological status  - Monitor vital signs such as temperature, blood pressure, glucose, and any other labs ordered   - Initiate measures to prevent increased intracranial pressure  - Monitor for seizure activity and implement precautions if appropriate      8/24/2024 1356 by Alexander Syed RN  Outcome: Progressing  8/24/2024 1354 by Alexander Syed RN  Outcome: Progressing     Problem: CARDIOVASCULAR - ADULT  Goal: Maintains optimal cardiac output and hemodynamic stability  Description: INTERVENTIONS:  - Monitor I/O, vital signs and rhythm  - Monitor for S/S and trends of decreased cardiac output  - Administer and titrate ordered vasoactive medications to optimize hemodynamic stability  - Assess quality of pulses, skin color and temperature  - Assess for signs of decreased coronary artery perfusion  - Instruct patient to report change in severity of symptoms  8/24/2024 1356 by Alexander Syed RN  Outcome: Progressing  8/24/2024 1354 by Alexander Syed  RN  Outcome: Progressing  Goal: Absence of cardiac dysrhythmias or at baseline rhythm  Description: INTERVENTIONS:  - Continuous cardiac monitoring, vital signs, obtain 12 lead EKG if ordered  - Administer antiarrhythmic and heart rate control medications as ordered  - Monitor electrolytes and administer replacement therapy as ordered  8/24/2024 1356 by Alexander Syed RN  Outcome: Progressing  8/24/2024 1354 by Alexander Syed RN  Outcome: Progressing     Problem: RESPIRATORY - ADULT  Goal: Achieves optimal ventilation and oxygenation  Description: INTERVENTIONS:  - Assess for changes in respiratory status  - Assess for changes in mentation and behavior  - Position to facilitate oxygenation and minimize respiratory effort  - Oxygen administered by appropriate delivery if ordered  - Initiate smoking cessation education as indicated  - Encourage broncho-pulmonary hygiene including cough, deep breathe, Incentive Spirometry  - Assess the need for suctioning and aspirate as needed  - Assess and instruct to report SOB or any respiratory difficulty  - Respiratory Therapy support as indicated  8/24/2024 1356 by Alexander Syed RN  Outcome: Progressing  8/24/2024 1354 by Alexander Syed RN  Outcome: Progressing     Problem: GENITOURINARY - ADULT  Goal: Maintains or returns to baseline urinary function  Description: INTERVENTIONS:  - Assess urinary function  - Encourage oral fluids to ensure adequate hydration if ordered  - Administer IV fluids as ordered to ensure adequate hydration  - Administer ordered medications as needed  - Offer frequent toileting  - Follow urinary retention protocol if ordered  8/24/2024 1356 by Alexander Syed RN  Outcome: Progressing  8/24/2024 1354 by Alexander Syed RN  Outcome: Progressing  Goal: Absence of urinary retention  Description: INTERVENTIONS:  - Assess patient’s ability to void and empty bladder  - Monitor I/O  - Bladder scan as needed  - Discuss with physician/AP medications to  alleviate retention as needed  - Discuss catheterization for long term situations as appropriate  8/24/2024 1356 by Alexander Syed RN  Outcome: Progressing  8/24/2024 1354 by Alexander Syed RN  Outcome: Progressing  Goal: Urinary catheter remains patent  Description: INTERVENTIONS:  - Assess patency of urinary catheter  - If patient has a chronic avilez, consider changing catheter if non-functioning  - Follow guidelines for intermittent irrigation of non-functioning urinary catheter  8/24/2024 1356 by Alexander Syed RN  Outcome: Progressing  8/24/2024 1354 by Alexander Syed RN  Outcome: Progressing     Problem: METABOLIC, FLUID AND ELECTROLYTES - ADULT  Goal: Electrolytes maintained within normal limits  Description: INTERVENTIONS:  - Monitor labs and assess patient for signs and symptoms of electrolyte imbalances  - Administer electrolyte replacement as ordered  - Monitor response to electrolyte replacements, including repeat lab results as appropriate  - Instruct patient on fluid and nutrition as appropriate  8/24/2024 1356 by Alexander Syed RN  Outcome: Progressing  8/24/2024 1354 by Alexander Syed RN  Outcome: Progressing  Goal: Fluid balance maintained  Description: INTERVENTIONS:  - Monitor labs   - Monitor I/O and WT  - Instruct patient on fluid and nutrition as appropriate  - Assess for signs & symptoms of volume excess or deficit  8/24/2024 1356 by Alexander Syed RN  Outcome: Progressing  8/24/2024 1354 by Alexander Syed RN  Outcome: Progressing  Goal: Glucose maintained within target range  Description: INTERVENTIONS:  - Monitor Blood Glucose as ordered  - Assess for signs and symptoms of hyperglycemia and hypoglycemia  - Administer ordered medications to maintain glucose within target range  - Assess nutritional intake and initiate nutrition service referral as needed  8/24/2024 1356 by Alexander Syed RN  Outcome: Progressing  8/24/2024 1354 by Alexander Syed RN  Outcome: Progressing     Problem:  SKIN/TISSUE INTEGRITY - ADULT  Goal: Skin Integrity remains intact(Skin Breakdown Prevention)  Description: Assess:  -Perform Iraj assessment every 2 hrs  -Clean and moisturize skin every 2 hrs  -Inspect skin when repositioning, toileting, and assisting with ADLS  -Assess under medical devices such as Line dressings every 4 hrs  -Assess extremities for adequate circulation and sensation     Bed Management:  -Have minimal linens on bed & keep smooth, unwrinkled  -Change linens as needed when moist or perspiring  -Avoid sitting or lying in one position for more than 2 hours while in bed  -Keep HOB at 45degrees     Toileting:  -Offer bedside commode  -Assess for incontinence every 2 hrs  -Use incontinent care products after each incontinent episode such as baby powder    Activity:  -Mobilize patient 1 times a day  -Encourage activity and walks on unit  -Encourage or provide ROM exercises   -Turn and reposition patient every 2 Hours  -Use appropriate equipment to lift or move patient in bed      Skin Care:  -Avoid use of baby powder, tape, friction and shearing, hot water or constrictive clothing  -Relieve pressure over bony prominences using pillows  -Do not massage red bony areas    Next Steps:  -Teach patient strategies to minimize risks such as repositioning   -Consider consults to  interdisciplinary teams such as PT  8/24/2024 1356 by Alexander Syed RN  Outcome: Progressing  8/24/2024 1354 by Alexander Syed RN  Outcome: Progressing  Goal: Incision(s), wounds(s) or drain site(s) healing without S/S of infection  Description: INTERVENTIONS  - Assess and document dressing, incision, wound bed, drain sites and surrounding tissue  - Provide patient and family education  - Perform skin care/dressing changes every day  8/24/2024 1356 by Alexander Syed RN  Outcome: Progressing  8/24/2024 1354 by Alexander Syed RN  Outcome: Progressing  Goal: Pressure injury heals and does not worsen  Description: Interventions:  -  Implement low air loss mattress or specialty surface (Criteria met)  - Apply silicone foam dressing  - Perform passive or active ROM every 4 hrs  - Turn and reposition patient & offload bony prominences every 2 hours   - Utilize friction reducing device or surface for transfers   - Consider consults to  interdisciplinary teams such as PT  - Use incontinent care products after each incontinent episode such as baby powder  - Consider nutrition services referral as needed  8/24/2024 1356 by Alexander Syed RN  Outcome: Progressing  8/24/2024 1354 by Alexander Syed RN  Outcome: Progressing     Problem: HEMATOLOGIC - ADULT  Goal: Maintains hematologic stability  Description: INTERVENTIONS  - Assess for signs and symptoms of bleeding or hemorrhage  - Monitor labs  - Administer supportive blood products/factors as ordered and appropriate  8/24/2024 1356 by Alexander Syed RN  Outcome: Progressing  8/24/2024 1354 by Alexander Syed RN  Outcome: Progressing     Problem: MUSCULOSKELETAL - ADULT  Goal: Maintain or return mobility to safest level of function  Description: INTERVENTIONS:  - Assess patient's ability to carry out ADLs; assess patient's baseline for ADL function and identify physical deficits which impact ability to perform ADLs (bathing, care of mouth/teeth, toileting, grooming, dressing, etc.)  - Assess/evaluate cause of self-care deficits   - Assess range of motion  - Assess patient's mobility  - Assess patient's need for assistive devices and provide as appropriate  - Encourage maximum independence but intervene and supervise when necessary  - Involve family in performance of ADLs  - Assess for home care needs following discharge   - Consider OT consult to assist with ADL evaluation and planning for discharge  - Provide patient education as appropriate  8/24/2024 1356 by Alexander Syed RN  Outcome: Progressing  8/24/2024 1354 by Alexander Syed RN  Outcome: Progressing  Goal: Maintain proper alignment of  affected body part  Description: INTERVENTIONS:  - Support, maintain and protect limb and body alignment  - Provide patient/ family with appropriate education  8/24/2024 1356 by Alexander Syed RN  Outcome: Progressing  8/24/2024 1354 by Alexander Syed, RN  Outcome: Progressing

## 2024-08-24 NOTE — ASSESSMENT & PLAN NOTE
Developed new onset hypotension in the setting of afib with RVR  Started on levophed - titrate to maintain MAP > 65 mmHg  Attempt rate control with amiodarone  Consider additional IVF  Maintain jose/central line

## 2024-08-24 NOTE — ASSESSMENT & PLAN NOTE
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 3 mm epidural phlegmon/small abscess is also suspected. There is moderate resultant central stenosis and mild mass effect on the cord  Neurosurgery consulted - recommended C-collar at all times but patient unable to tolerate  Recommending 6 weeks IV antibiotics and follow-up repeat imaging  Q4h neuro checks

## 2024-08-24 NOTE — ASSESSMENT & PLAN NOTE
Currently requiring 6L NC and is tachypneic  Appears related to low lung volumes 2/2 pain  Wean O2 as tolerated to maintain Spo2 > 92%  Consider BiPAP for rest

## 2024-08-24 NOTE — RESPIRATORY THERAPY NOTE
RT Protocol Note  Augustus Coffman 68 y.o. male MRN: [id number]  Unit/Bed#: PPHP-310-01 Encounter: [id number]    Assessment    Principal Problem:    MSSA bacteremia  Active Problems:    History of hypertension    Diabetic peripheral neuropathy (HCC)    Type 2 diabetes mellitus (HCC)    FINA (acute kidney injury) on CKD stage 2(HCC)    Cervical discitis    Transaminitis    New onset a-fib (HCC)    Hyperkalemia    Acute pericarditis    Diabetic ulcer of right foot (HCC)    Acute respiratory insufficiency    Hypotension    Acute neck pain      Home Pulmonary Medications:  None        Past Medical History:   Diagnosis Date    Arthritis 2010's    Occasionally flares up    Cancer (HCC) May 2021    Still investigating    Chronic kidney disease     Diabetes mellitus (HCC)     Follicular lymphoma (HCC)     GERD (gastroesophageal reflux disease) June 2021    Noticed in an Endoscopy    Heart murmur May 2020    High cholesterol     Hypertension     Kidney stone 1980's    Have had since 1980's    Obesity Since Childhood     Social History     Socioeconomic History    Marital status: /Civil Union     Spouse name: Not on file    Number of children: Not on file    Years of education: Not on file    Highest education level: Not on file   Occupational History    Not on file   Tobacco Use    Smoking status: Never    Smokeless tobacco: Never    Tobacco comments:     Never smoked but exposed to second hand smoke from birth until 1980's   Vaping Use    Vaping status: Never Used   Substance and Sexual Activity    Alcohol use: Never    Drug use: Never    Sexual activity: Not Currently     Partners: Female     Birth control/protection: Abstinence   Other Topics Concern    Not on file   Social History Narrative    Not on file     Social Determinants of Health     Financial Resource Strain: Not on file   Food Insecurity: No Food Insecurity (8/21/2024)    Hunger Vital Sign     Worried About Running Out of Food in the Last Year: Never  "true     Ran Out of Food in the Last Year: Never true   Transportation Needs: No Transportation Needs (8/21/2024)    PRAPARE - Transportation     Lack of Transportation (Medical): No     Lack of Transportation (Non-Medical): No   Physical Activity: Not on file   Stress: Not on file   Social Connections: Unknown (6/18/2024)    Received from [employer]     How often do you feel lonely or isolated from those around you? (Adult - for ages 18 years and over): Not on file   Intimate Partner Violence: Not on file   Housing Stability: Low Risk  (8/21/2024)    Housing Stability Vital Sign     Unable to Pay for Housing in the Last Year: No     Number of Times Moved in the Last Year: 0     Homeless in the Last Year: No       Subjective         Objective    Physical Exam:   Assessment Type: Assess only  General Appearance: Awake, Alert  Respiratory Pattern: Normal  Chest Assessment: Chest expansion symmetrical  Bilateral Breath Sounds: Clear, Diminished  O2 Device: nasal cannula    Vitals:  Blood pressure 129/86, pulse (!) 109, temperature (!) 97.3 °F (36.3 °C), temperature source Axillary, resp. rate (!) 25, height 6' 3\" (1.905 m), weight 123 kg (272 lb), SpO2 93%.          Imaging and other studies: I have personally reviewed pertinent reports.      O2 Device: nasal cannula     Plan    Respiratory Plan: Discontinue Protocol        Resp Comments: pt assessed at this time for respiratory protocol. pt currently on 8lpm mfnc. pt takes no pulmonary medications at home and does not have any pulmonary hx.  bronchodilators are not indicated at this time. will d/c respiratory protocol at this time.   "

## 2024-08-24 NOTE — ASSESSMENT & PLAN NOTE
Developed new onset afib 8/21/8/22  Initiated on heparin infusion and metoprolol  Unable to tolerate metoprolol 2/2 hypotension  Received amiodarone bolus and initiated on infusion    Plan:  Repeat amiodarone bolus now  Continue amiodarone infusion  Continue heparin drip

## 2024-08-24 NOTE — PROGRESS NOTES
Augustus Coffman is a 68 y.o. male who is currently ordered Vancomycin IV with management by the Pharmacy Consult service.  Relevant clinical data and objective / subjective history reviewed.  Vancomycin Assessment:  Indication and Goal AUC/Trough: Bone/joint infection (goal -600, trough >10); Soft tissue (goal -600, trough >10), -600, trough >10  Clinical Status:  critically ill  Micro:     Renal Function:  SCr: 2.92 mg/dL  CrCl: 34.5 mL/min  Renal replacement: Not on dialysis  Days of Therapy: 1  Current Dose: 2 g IV loading dose  Vancomycin Plan:  New Dosin mg IV if random level < 15  Next Level: 8-24-24 at 0600  Renal Function Monitoring: Daily BMP and UOP  Pharmacy will continue to follow closely for s/sx of nephrotoxicity, infusion reactions and appropriateness of therapy.  BMP and CBC will be ordered per protocol. We will continue to follow the patient’s culture results and clinical progress daily.    Haydee Banks, Pharmacist

## 2024-08-24 NOTE — SEPSIS NOTE
"  Sepsis Note   Augustus Coffman 68 y.o. male MRN: [id number]  Unit/Bed#: PPHP-310-01 Encounter: [id number]       Initial Sepsis Screening       Row Name 08/23/24 2117                Is the patient's history suggestive of a new or worsening infection? Yes (Proceed)  -DH        Suspected source of infection suspect infection, source unknown  -DH        Indicate SIRS criteria Leukocytosis (WBC > 63709 IJL) OR Leukopenia (WBC <4000 IJL) OR Bandemia (WBC >10% bands);Tachypnea > 20 resp per min;Tachycardia > 90 bpm  -DH        Are two or more of the above signs & symptoms of infection both present and new to the patient? Yes (Proceed)  -DH        Assess for evidence of organ dysfunction: Are any of the below criteria present within 6 hours of suspected infection and SIRS criteria that are NOT considered to be chronic conditions? SBP < 90;MAP < 65;Lactate > 2.0;Creatinine > 2.0  -DH        Date of presentation of severe sepsis 08/23/24  -        Time of presentation of severe sepsis 1951  -        Date of presentation of septic shock 08/23/24  -        Time of presentation of septic shock 1951  -        Fluid Resuscitation: A lesser volume than 30 ml/kg IV fluid will be given  -DH        The 30 mL/kg fluid bolus was not given to the patient despite having hypotension, a lactate of >= 4 mmol/L, or documentation of septic shock secondary to: Concern for fluid/volume overload  -DH        Instead of the 30 ml/kg fluid bolus, the following volume of crystalloid fluid will be ordered: 500 ml  -DH        Of the following fluid type: Other (document in comments)  albumin  -DH        Is the patient is persistently hypotensive in the hour after fluid bolus administration? If yes, patient meets criteria for vasopressor use. YES  -DH        Sepsis Note: Click \"NEXT\" below (NOT \"close\") to generate sepsis note based on above information. --                  User Key  (r) = Recorded By, (t) = Taken By, (c) = Cosigned By      " "Initials Name Provider Type     Breonna Santana PA-C Physician Assistant                    Default Flowsheet Data (Last 720 Hours)       Sepsis Reassess       Row Name 08/24/24 0002                   Repeat Volume Status and Tissue Perfusion Assessment Performed    Date of Reassessment: 08/23/24  -        Time of Reassessment: 2243 -DH        Sepsis Reassessment Note: Click \"NEXT\" below (NOT \"close\") to generate sepsis reassessment note. --        Repeat Volume Status and Tissue Perfusion Assessment Performed --                  User Key  (r) = Recorded By, (t) = Taken By, (c) = Cosigned By      Initials Name Provider Type     Breonna Santana PA-C Physician Assistant                    Body mass index is 33.48 kg/m².  Wt Readings from Last 1 Encounters:   08/23/24 121 kg (267 lb 13.7 oz)     IBW (Ideal Body Weight): 84.5 kg    Ideal body weight: 84.5 kg (186 lb 4.6 oz)  Adjusted ideal body weight: 99.3 kg (218 lb 14.7 oz)    "

## 2024-08-24 NOTE — ASSESSMENT & PLAN NOTE
Tylenol 1g IV q6h  Oxycodone 5 mg/10 mg q4h PRN   Robaxin 750 mg every 6 hrs  Dilaudid 0.5 mg IV q3h PRN for breakthrough pain

## 2024-08-24 NOTE — ASSESSMENT & PLAN NOTE
Currently requiring 6L NC and is tachypneic  Appears related to low lung volumes 2/2 pain  Wean O2 as tolerated to maintain Spo2 > 92%  Was unable to tolerate BiPAP

## 2024-08-24 NOTE — PROCEDURES
Arterial Line Insertion    Date/Time: 8/23/2024 7:55 PM    Performed by: Breonna Santana PA-C  Authorized by: Breonna Santana PA-C    Patient location:  ICU  Other Assisting Provider: No    Consent:     Consent obtained:  Verbal    Consent given by:  Patient  Universal protocol:     Patient identity confirmed:  Verbally with patient  Indications:     Indications: hemodynamic monitoring    Pre-procedure details:     Skin preparation:  Chlorhexidine    Preparation: Patient was prepped and draped in sterile fashion    Anesthesia (see MAR for exact dosages):     Anesthesia method:  Local infiltration    Local anesthetic:  Lidocaine 1% w/o epi  Procedure details:     Location / Tip of Catheter:  Radial    Laterality:  Right    Needle gauge:  18 G    Placement technique:  Percutaneous and ultrasound guided    Ultrasound image availability:  Not saved    Sterile ultrasound techniques: Sterile gel and sterile probe covers were used      Number of attempts:  1    Successful placement: yes      Transducer: waveform confirmed    Post-procedure details:     Post-procedure:  Sterile dressing applied and sutured    CMS:  Unchanged    Patient tolerance of procedure:  Tolerated well, no immediate complications

## 2024-08-25 ENCOUNTER — APPOINTMENT (INPATIENT)
Dept: RADIOLOGY | Facility: HOSPITAL | Age: 69
DRG: 853 | End: 2024-08-25
Payer: MEDICARE

## 2024-08-25 PROBLEM — I95.9 HYPOTENSION: Status: RESOLVED | Noted: 2024-08-23 | Resolved: 2024-08-25

## 2024-08-25 LAB
ALBUMIN SERPL BCG-MCNC: 3.2 G/DL (ref 3.5–5)
ALP SERPL-CCNC: 188 U/L (ref 34–104)
ALT SERPL W P-5'-P-CCNC: 81 U/L (ref 7–52)
ANION GAP SERPL CALCULATED.3IONS-SCNC: 14 MMOL/L (ref 4–13)
ANION GAP SERPL CALCULATED.3IONS-SCNC: 14 MMOL/L (ref 4–13)
ANION GAP SERPL CALCULATED.3IONS-SCNC: 15 MMOL/L (ref 4–13)
APTT PPP: 61 SECONDS (ref 23–34)
AST SERPL W P-5'-P-CCNC: 917 U/L (ref 13–39)
ATRIAL RATE: 178 BPM
ATRIAL RATE: 202 BPM
ATRIAL RATE: 241 BPM
ATRIAL RATE: 288 BPM
ATRIAL RATE: 88 BPM
ATRIAL RATE: 91 BPM
BASE EX.OXY STD BLDV CALC-SCNC: 73.1 % (ref 60–80)
BASE EXCESS BLDV CALC-SCNC: -0.5 MMOL/L
BILIRUB DIRECT SERPL-MCNC: 0.24 MG/DL (ref 0–0.2)
BILIRUB SERPL-MCNC: 0.45 MG/DL (ref 0.2–1)
BUN SERPL-MCNC: 59 MG/DL (ref 5–25)
BUN SERPL-MCNC: 63 MG/DL (ref 5–25)
BUN SERPL-MCNC: 66 MG/DL (ref 5–25)
CA-I BLD-SCNC: 1.12 MMOL/L (ref 1.12–1.32)
CALCIUM SERPL-MCNC: 9.3 MG/DL (ref 8.4–10.2)
CALCIUM SERPL-MCNC: 9.3 MG/DL (ref 8.4–10.2)
CALCIUM SERPL-MCNC: 9.4 MG/DL (ref 8.4–10.2)
CHLORIDE SERPL-SCNC: 91 MMOL/L (ref 96–108)
CHLORIDE SERPL-SCNC: 93 MMOL/L (ref 96–108)
CHLORIDE SERPL-SCNC: 94 MMOL/L (ref 96–108)
CO2 SERPL-SCNC: 27 MMOL/L (ref 21–32)
CO2 SERPL-SCNC: 27 MMOL/L (ref 21–32)
CO2 SERPL-SCNC: 28 MMOL/L (ref 21–32)
CREAT SERPL-MCNC: 3.05 MG/DL (ref 0.6–1.3)
CREAT SERPL-MCNC: 3.13 MG/DL (ref 0.6–1.3)
CREAT SERPL-MCNC: 3.23 MG/DL (ref 0.6–1.3)
ERYTHROCYTE [DISTWIDTH] IN BLOOD BY AUTOMATED COUNT: 17.2 % (ref 11.6–15.1)
GFR SERPL CREATININE-BSD FRML MDRD: 18 ML/MIN/1.73SQ M
GFR SERPL CREATININE-BSD FRML MDRD: 19 ML/MIN/1.73SQ M
GFR SERPL CREATININE-BSD FRML MDRD: 20 ML/MIN/1.73SQ M
GLUCOSE SERPL-MCNC: 102 MG/DL (ref 65–140)
GLUCOSE SERPL-MCNC: 112 MG/DL (ref 65–140)
GLUCOSE SERPL-MCNC: 112 MG/DL (ref 65–140)
GLUCOSE SERPL-MCNC: 116 MG/DL (ref 65–140)
GLUCOSE SERPL-MCNC: 119 MG/DL (ref 65–140)
GLUCOSE SERPL-MCNC: 119 MG/DL (ref 65–140)
GLUCOSE SERPL-MCNC: 126 MG/DL (ref 65–140)
GLUCOSE SERPL-MCNC: 129 MG/DL (ref 65–140)
GLUCOSE SERPL-MCNC: 138 MG/DL (ref 65–140)
GLUCOSE SERPL-MCNC: 139 MG/DL (ref 65–140)
GLUCOSE SERPL-MCNC: 141 MG/DL (ref 65–140)
GLUCOSE SERPL-MCNC: 146 MG/DL (ref 65–140)
GLUCOSE SERPL-MCNC: 147 MG/DL (ref 65–140)
GLUCOSE SERPL-MCNC: 164 MG/DL (ref 65–140)
GLUCOSE SERPL-MCNC: 174 MG/DL (ref 65–140)
HCO3 BLDV-SCNC: 27.2 MMOL/L (ref 24–30)
HCT VFR BLD AUTO: 33.4 % (ref 36.5–49.3)
HGB BLD-MCNC: 10.2 G/DL (ref 12–17)
INR PPP: 1.56 (ref 0.85–1.19)
MAGNESIUM SERPL-MCNC: 2.2 MG/DL (ref 1.9–2.7)
MCH RBC QN AUTO: 24.8 PG (ref 26.8–34.3)
MCHC RBC AUTO-ENTMCNC: 30.5 G/DL (ref 31.4–37.4)
MCV RBC AUTO: 81 FL (ref 82–98)
MRSA NOSE QL CULT: NORMAL
O2 CT BLDV-SCNC: 11.7 ML/DL
P AXIS: 233 DEGREES
P AXIS: 48 DEGREES
P AXIS: 55 DEGREES
PCO2 BLDV: 59.9 MM HG (ref 42–50)
PH BLDV: 7.28 [PH] (ref 7.3–7.4)
PHOSPHATE SERPL-MCNC: 7.1 MG/DL (ref 2.3–4.1)
PLATELET # BLD AUTO: 366 THOUSANDS/UL (ref 149–390)
PMV BLD AUTO: 10 FL (ref 8.9–12.7)
PO2 BLDV: 46.6 MM HG (ref 35–45)
POTASSIUM SERPL-SCNC: 4.3 MMOL/L (ref 3.5–5.3)
POTASSIUM SERPL-SCNC: 4.5 MMOL/L (ref 3.5–5.3)
POTASSIUM SERPL-SCNC: 4.8 MMOL/L (ref 3.5–5.3)
PR INTERVAL: 186 MS
PR INTERVAL: 196 MS
PROT SERPL-MCNC: 7 G/DL (ref 6.4–8.4)
PROTHROMBIN TIME: 18.9 SECONDS (ref 12.3–15)
QRS AXIS: 15 DEGREES
QRS AXIS: 15 DEGREES
QRS AXIS: 18 DEGREES
QRS AXIS: 3 DEGREES
QRS AXIS: 7 DEGREES
QRS AXIS: 7 DEGREES
QRSD INTERVAL: 62 MS
QRSD INTERVAL: 66 MS
QRSD INTERVAL: 68 MS
QRSD INTERVAL: 68 MS
QRSD INTERVAL: 76 MS
QRSD INTERVAL: 82 MS
QT INTERVAL: 290 MS
QT INTERVAL: 314 MS
QT INTERVAL: 334 MS
QT INTERVAL: 352 MS
QT INTERVAL: 360 MS
QT INTERVAL: 366 MS
QTC INTERVAL: 425 MS
QTC INTERVAL: 428 MS
QTC INTERVAL: 446 MS
QTC INTERVAL: 450 MS
QTC INTERVAL: 464 MS
QTC INTERVAL: 525 MS
RBC # BLD AUTO: 4.11 MILLION/UL (ref 3.88–5.62)
SODIUM SERPL-SCNC: 134 MMOL/L (ref 135–147)
SODIUM SERPL-SCNC: 134 MMOL/L (ref 135–147)
SODIUM SERPL-SCNC: 135 MMOL/L (ref 135–147)
T WAVE AXIS: 44 DEGREES
T WAVE AXIS: 45 DEGREES
T WAVE AXIS: 50 DEGREES
T WAVE AXIS: 51 DEGREES
T WAVE AXIS: 51 DEGREES
T WAVE AXIS: 53 DEGREES
VENTRICULAR RATE: 112 BPM
VENTRICULAR RATE: 116 BPM
VENTRICULAR RATE: 128 BPM
VENTRICULAR RATE: 142 BPM
VENTRICULAR RATE: 88 BPM
VENTRICULAR RATE: 91 BPM
WBC # BLD AUTO: 17.18 THOUSAND/UL (ref 4.31–10.16)

## 2024-08-25 PROCEDURE — NC001 PR NO CHARGE: Performed by: NEUROLOGICAL SURGERY

## 2024-08-25 PROCEDURE — 82805 BLOOD GASES W/O2 SATURATION: CPT | Performed by: PHYSICIAN ASSISTANT

## 2024-08-25 PROCEDURE — 82330 ASSAY OF CALCIUM: CPT | Performed by: PHYSICIAN ASSISTANT

## 2024-08-25 PROCEDURE — 85027 COMPLETE CBC AUTOMATED: CPT | Performed by: PHYSICIAN ASSISTANT

## 2024-08-25 PROCEDURE — 93010 ELECTROCARDIOGRAM REPORT: CPT | Performed by: INTERNAL MEDICINE

## 2024-08-25 PROCEDURE — 93005 ELECTROCARDIOGRAM TRACING: CPT

## 2024-08-25 PROCEDURE — 99233 SBSQ HOSP IP/OBS HIGH 50: CPT | Performed by: STUDENT IN AN ORGANIZED HEALTH CARE EDUCATION/TRAINING PROGRAM

## 2024-08-25 PROCEDURE — 85730 THROMBOPLASTIN TIME PARTIAL: CPT | Performed by: STUDENT IN AN ORGANIZED HEALTH CARE EDUCATION/TRAINING PROGRAM

## 2024-08-25 PROCEDURE — 99232 SBSQ HOSP IP/OBS MODERATE 35: CPT | Performed by: INTERNAL MEDICINE

## 2024-08-25 PROCEDURE — 80048 BASIC METABOLIC PNL TOTAL CA: CPT | Performed by: PHYSICIAN ASSISTANT

## 2024-08-25 PROCEDURE — 83735 ASSAY OF MAGNESIUM: CPT | Performed by: PHYSICIAN ASSISTANT

## 2024-08-25 PROCEDURE — NC001 PR NO CHARGE: Performed by: PODIATRIST

## 2024-08-25 PROCEDURE — 82948 REAGENT STRIP/BLOOD GLUCOSE: CPT

## 2024-08-25 PROCEDURE — 80076 HEPATIC FUNCTION PANEL: CPT | Performed by: PHYSICIAN ASSISTANT

## 2024-08-25 PROCEDURE — 84100 ASSAY OF PHOSPHORUS: CPT | Performed by: PHYSICIAN ASSISTANT

## 2024-08-25 PROCEDURE — 72040 X-RAY EXAM NECK SPINE 2-3 VW: CPT

## 2024-08-25 PROCEDURE — 94760 N-INVAS EAR/PLS OXIMETRY 1: CPT

## 2024-08-25 PROCEDURE — 85610 PROTHROMBIN TIME: CPT | Performed by: PHYSICIAN ASSISTANT

## 2024-08-25 RX ORDER — AMIODARONE HYDROCHLORIDE 200 MG/1
200 TABLET ORAL 2 TIMES DAILY WITH MEALS
Status: DISCONTINUED | OUTPATIENT
Start: 2024-08-25 | End: 2024-09-03 | Stop reason: HOSPADM

## 2024-08-25 RX ORDER — BUMETANIDE 0.25 MG/ML
1 INJECTION INTRAMUSCULAR; INTRAVENOUS EVERY 8 HOURS
Status: DISCONTINUED | OUTPATIENT
Start: 2024-08-25 | End: 2024-08-26

## 2024-08-25 RX ORDER — ACETAMINOPHEN 325 MG/1
975 TABLET ORAL EVERY 6 HOURS SCHEDULED
Status: DISCONTINUED | OUTPATIENT
Start: 2024-08-25 | End: 2024-09-03 | Stop reason: HOSPADM

## 2024-08-25 RX ORDER — METOPROLOL TARTRATE 1 MG/ML
5 INJECTION, SOLUTION INTRAVENOUS ONCE
Status: COMPLETED | OUTPATIENT
Start: 2024-08-25 | End: 2024-08-25

## 2024-08-25 RX ADMIN — HEPARIN SODIUM 19 UNITS/KG/HR: 10000 INJECTION, SOLUTION INTRAVENOUS at 20:50

## 2024-08-25 RX ADMIN — PREDNISONE 30 MG: 10 TABLET ORAL at 08:27

## 2024-08-25 RX ADMIN — AMIODARONE HYDROCHLORIDE 200 MG: 200 TABLET ORAL at 17:59

## 2024-08-25 RX ADMIN — ACETAMINOPHEN 975 MG: 325 TABLET ORAL at 17:59

## 2024-08-25 RX ADMIN — CEFAZOLIN SODIUM 2000 MG: 2 SOLUTION INTRAVENOUS at 00:12

## 2024-08-25 RX ADMIN — CEFAZOLIN SODIUM 2000 MG: 2 SOLUTION INTRAVENOUS at 05:14

## 2024-08-25 RX ADMIN — CEFAZOLIN SODIUM 2000 MG: 2 SOLUTION INTRAVENOUS at 12:26

## 2024-08-25 RX ADMIN — SENNOSIDES 8.6 MG: 8.6 TABLET, FILM COATED ORAL at 08:27

## 2024-08-25 RX ADMIN — METHOCARBAMOL 750 MG: 750 TABLET ORAL at 08:27

## 2024-08-25 RX ADMIN — BUMETANIDE 1 MG: 0.25 INJECTION INTRAMUSCULAR; INTRAVENOUS at 14:17

## 2024-08-25 RX ADMIN — CHLORHEXIDINE GLUCONATE 0.12% ORAL RINSE 15 ML: 1.2 LIQUID ORAL at 21:47

## 2024-08-25 RX ADMIN — METHOCARBAMOL 750 MG: 750 TABLET ORAL at 14:17

## 2024-08-25 RX ADMIN — ACETAMINOPHEN 975 MG: 325 TABLET ORAL at 21:47

## 2024-08-25 RX ADMIN — CEFAZOLIN SODIUM 2000 MG: 2 SOLUTION INTRAVENOUS at 18:00

## 2024-08-25 RX ADMIN — CHLORHEXIDINE GLUCONATE 0.12% ORAL RINSE 15 ML: 1.2 LIQUID ORAL at 08:27

## 2024-08-25 RX ADMIN — AMIODARONE HYDROCHLORIDE 200 MG: 200 TABLET ORAL at 08:27

## 2024-08-25 RX ADMIN — POLYETHYLENE GLYCOL 3350 17 G: 17 POWDER, FOR SOLUTION ORAL at 08:27

## 2024-08-25 RX ADMIN — DOCUSATE SODIUM 100 MG: 100 CAPSULE, LIQUID FILLED ORAL at 17:59

## 2024-08-25 RX ADMIN — CEFAZOLIN SODIUM 2000 MG: 2 SOLUTION INTRAVENOUS at 23:48

## 2024-08-25 RX ADMIN — METHOCARBAMOL 750 MG: 750 TABLET ORAL at 21:47

## 2024-08-25 RX ADMIN — DEXTROSE 150 MG: 50 INJECTION, SOLUTION INTRAVENOUS at 19:05

## 2024-08-25 RX ADMIN — SODIUM CHLORIDE 2 UNITS/HR: 9 INJECTION, SOLUTION INTRAVENOUS at 07:43

## 2024-08-25 RX ADMIN — BUMETANIDE 1 MG: 0.25 INJECTION INTRAMUSCULAR; INTRAVENOUS at 21:47

## 2024-08-25 RX ADMIN — COLCHICINE 0.6 MG: 0.6 TABLET ORAL at 08:27

## 2024-08-25 RX ADMIN — ACETAMINOPHEN 1000 MG: 10 INJECTION INTRAVENOUS at 03:52

## 2024-08-25 RX ADMIN — Medication 2 MG/HR: at 02:10

## 2024-08-25 RX ADMIN — OXYCODONE HYDROCHLORIDE 10 MG: 10 TABLET ORAL at 00:19

## 2024-08-25 RX ADMIN — ACETAMINOPHEN 975 MG: 325 TABLET ORAL at 10:24

## 2024-08-25 RX ADMIN — HEPARIN SODIUM 19 UNITS/KG/HR: 10000 INJECTION, SOLUTION INTRAVENOUS at 05:20

## 2024-08-25 RX ADMIN — METHOCARBAMOL 750 MG: 750 TABLET ORAL at 02:19

## 2024-08-25 RX ADMIN — DOCUSATE SODIUM 100 MG: 100 CAPSULE, LIQUID FILLED ORAL at 08:27

## 2024-08-25 RX ADMIN — METOPROLOL TARTRATE 5 MG: 1 INJECTION, SOLUTION INTRAVENOUS at 19:53

## 2024-08-25 RX ADMIN — COLCHICINE 0.6 MG: 0.6 TABLET ORAL at 17:59

## 2024-08-25 NOTE — DISCHARGE INSTR - AVS FIRST PAGE
Neurosurgery discharge instructions:     VISTA collar at all times except for showering change to yesenia (peach) collar.  No bending, twisting or heavy lifting. No pushing or pulling over 10lbs. No strenuous activities. NO DRIVING.     **Please notify MD immediately if you have increased neck or arm pain. New numbness and/or weakness in your arm. Difficulty swallowing or breathing especially while lying down. Numbness or weakness in arms or legs. Increased difficulty walking **      Discharge Instructions - Podiatry    Weight Bearing Status: Weight bearing as tolerated to right foot in a surgical shoe                   Pain: Continue analgesics as needed     Follow-up appointment instructions: Please make an appointment within one week of discharge with Dr. Luu . Contact sooner if any increase in pain, or signs of infection occur    Wound Care: Leave dressings clean, dry, and intact between professional dressing changes    Nursing Instructions: Please apply  Mepilex Up to the wound bed . Then cover with 4x4 dry gauze and secure with rolled gauze and tape. Please change dressing every other day.       Nephrology discharge recommendations:  - To follow up on your kidney function please get blood work upon discharge on or around 9/11/24  - You will receive a copy of the order form for the blood work in the mail from the office.  - Nephrology office will contact you to set up a follow-up appointment in a few days/weeks.   - In the interim if any issues from a kidney standpoint please contact the office at [phone].              CBC CMP weekly while on antibiotics..  Discontinue  PICC line once the antibiotics is completed

## 2024-08-25 NOTE — PROGRESS NOTES
[facility]  Progress Note  Name: Augustus Coffman I  MRN: [id number]  Unit/Bed#: PPHP-310-01 I Date of Admission: 8/23/2024   Date of Service: 8/25/2024 I Hospital Day: 2    Assessment & Plan   * MSSA bacteremia  Assessment & Plan  Etiology likely 2/2 right foot ulcer  Complicated by cervical discitis  Echo without evidence of endocarditis    Plan:  ID consulted, appreciate their recommendations  Plan for ancef x 6 weeks  Repeat blood cultures sent given episodes of diaphoresis/chills  MRSA swab pending  UA with micro unremarkable for infection   Consider LUCRECIA given poor visualization of valves    Hypotension  Assessment & Plan  Developed new onset hypotension in the setting of afib with RVR  Weaned off levophed/vasopressin yesterday    New onset a-fib (HCC)  Assessment & Plan  Developed new onset afib 8/21/8/22  Initiated on heparin infusion and metoprolol  Unable to tolerate metoprolol 2/2 hypotension  Received amiodarone bolus and initiated on infusion    Plan:  Amiodarone infusion - transition to PO  Continue heparin drip    Acute pericarditis  Assessment & Plan  8/22 Echo: Limited echo for assessment of endocarditis.  The patient's aortic valve is difficult to assess due to calcification but there is no new significant regurgitation.  The mitral valve has hyperechoic thickening at the tips consistent with most likely calcification, these were seen on the echocardiograms in July.  The tricuspid valve similarly was not well-visualized but no new regurgitation.  The pulmonary valve was not well-visualized. Off note patient has new moderate pericardial effusion without specific echocardiographic signs of tamponade  Patient with symptoms consistent with pericarditis    Plan:  Cardiology consulted  Colchicine 0.6 mg BID  Unable to initiate NSAIDs 2/2 FINA  Prednisone 30 mg daily  Close hemodynamic monitoring    Acute respiratory insufficiency  Assessment & Plan  Currently  requiring MFNC  Appears related to low lung volumes 2/2 pain  Wean O2 as tolerated to maintain Spo2 > 92%  IS, OOB    FINA (acute kidney injury) on CKD stage 2([facility])  Assessment & Plan  Baseline Cr 1-1.2  Developed FINA 8/23 with associated hyperkalemia    Plan:  Nephrology following, appreciate their recommendations  Maintain avilez  Avoid nephrotoxins  Strict I/Os  Monitor renal function/Cr every 6 hours   Continue diuresis    Acute neck pain  Assessment & Plan  Tylenol 1g IV q6h  Oxycodone 5 mg/10 mg q4h PRN   Robaxin 750 mg every 6 hrs  Dilaudid 0.5 mg IV q3h PRN for breakthrough pain    Acute osteomyelitis of cervical spine (HCC)  Assessment & Plan  8/20 MRI C-Spine: Imaging findings suspicious for discitis osteomyelitis at C5-C6. 3 mm epidural phlegmon/small abscess is also suspected. There is moderate resultant central stenosis and mild mass effect on the cord  Neurosurgery consulted - recommended C-collar at all times but patient unable to tolerate  Upright xrays  Recommending 6 weeks IV antibiotics and follow-up repeat imaging  Q4h neuro checks    Hyperkalemia  Assessment & Plan  Developed FINA with hyperkalemia  Received medical therapy with insulin, calcium, bicarb, lasix, lokelma  Trending down currently with bumex drip  Trend BMP q6h    Diabetic ulcer of right foot ([facility])  Assessment & Plan  Follows as an outpatient with wound care  Likely source of MSSA bacteremia   Continue local wound care    Type 2 diabetes mellitus ([facility])  Assessment & Plan  Lab Results   Component Value Date    HGBA1C 6.8 (H) 07/05/2024       Recent Labs     08/24/24  1553 08/24/24  1756 08/24/24  1953 08/24/24  2203   POCGLU 161* 130 125 115         Blood Sugar Average: Last 72 hrs:  (P) [id number]  Home regimen: metformin  Insulin infusion for better glucose control   Goal blood glucose 140-180  Transition back to SSI once tolerating PO diet    History of hypertension  Assessment & Plan  Hx HTN on norvasc, HCTZ, losartan,  Toprol-XL  All currently on hold given hypotension  Statin on hold 2/2 transaminitis  Aspirin on hold - discuss restarting    Diabetic peripheral neuropathy (HCC)  Assessment & Plan  Lab Results   Component Value Date    HGBA1C 6.8 (H) 07/05/2024       Recent Labs     08/24/24  1553 08/24/24  1756 08/24/24  1953 08/24/24  2203   POCGLU 161* 130 125 115         Blood Sugar Average: Last 72 hrs:  (P) [id number]      Transaminitis  Assessment & Plan  Likely 2/2 poor perfusion/hypotension  Hold statin  Trend             Disposition: Med Surg with Telemetry    ICU Core Measures     A: Assess, Prevent, and Manage Pain Has pain been assessed? Yes  Need for changes to pain regimen? No   B: Both SAT/SAT  N/A   C: Choice of Sedation RASS Goal: N/A patient not on sedation  Need for changes to sedation or analgesia regimen? No   D: Delirium CAM-ICU: Negative   E: Early Mobility  Plan for early mobility? Yes   F: Family Engagement Plan for family engagement today? Yes       Antibiotic Review: Infectious disease consulted    Review of Invasive Devices:    Villa Plan: Voiding trial after improvement in ambulation   Central access plan: Will obtain peripheral access and discontinue prior to transfer  Sonia Plan: Discontinue arterial line    Prophylaxis:  VTE VTE covered by:  heparin (porcine), Intravenous, 19 Units/kg/hr at 08/24/24 1348  heparin (porcine), Intravenous  heparin (porcine), Intravenous       Stress Ulcer  not ordered         Significant 24hr Events     24hr events: Weaned off vasopressors. UOP and hyperkalemia improved with bumex infusion. Converted to NSR.      Subjective   Review of Systems: Review of Systems   Respiratory:  Negative for shortness of breath.    Cardiovascular:  Negative for chest pain.   Gastrointestinal:  Positive for constipation.   Musculoskeletal:  Positive for neck pain.        Objective                            Vitals I/O      Most Recent Min/Max in 24hrs   Temp 97.7 °F (36.5 °C)  Temp  Min: 97.3 °F (36.3 °C)  Max: 97.9 °F (36.6 °C)   Pulse 88 Pulse  Min: 85  Max: 126   Resp 22 Resp  Min: 12  Max: 30   /82 BP  Min: 108/77  Max: 169/73   O2 Sat 94 % SpO2  Min: 89 %  Max: 95 %      Intake/Output Summary (Last 24 hours) at 8/25/2024 0248  Last data filed at 8/25/2024 0150  Gross per 24 hour   Intake 2754.96 ml   Output 3085 ml   Net -330.04 ml       Diet Cardiovascular; Cardiac; Consistent Carbohydrate Diet Level 3 (6 carb servings/90 grams CHO/meal), Fluid Restriction 2000 ML    Invasive Monitoring   Arterial Line  [place] /78  Arterial Line BP  Min: 95/60  Max: 183/100    mmHg  Arterial Line MAP (mmHg)  Min: 73 mmHg  Max: 128 mmHg           Physical Exam   Physical Exam  Skin:     General: Skin is warm and dry.      Capillary Refill: Capillary refill takes less than 2 seconds.   HENT:      Head: Atraumatic.      Mouth/Throat:      Mouth: Mucous membranes are moist.   Neck:      Comments: Decreased ROM 2/2 pain  Cardiovascular:      Rate and Rhythm: Normal rate and regular rhythm.   Musculoskeletal:      Right lower leg: Trace Edema present.      Left lower leg: Trace Edema present.   Abdominal: General: There is distension.     Palpations: Abdomen is soft.      Tenderness: There is no abdominal tenderness.   Constitutional:       General: He is not in acute distress.  Pulmonary:      Effort: Pulmonary effort is normal.      Breath sounds: No rhonchi or rales.   Neurological:      General: No focal deficit present.      Mental Status: He is alert and oriented to person, place and time.   Genitourinary/Anorectal:  Villa present.          Diagnostic Studies        Imaging: Echo I have personally reviewed pertinent reports.       Medications:  Scheduled PRN   acetaminophen, 1,000 mg, Q6H SRIDHAR  cefazolin, 2,000 mg, Q6H  chlorhexidine, 15 mL, Q12H SRIDHAR  colchicine, 0.6 mg, BID  docusate sodium, 100 mg, BID  methocarbamol, 750 mg, Q6H SRIDHAR  polyethylene glycol, 17 g, Daily  predniSONE,  30 mg, Daily  senna, 1 tablet, Daily      acetaminophen, 650 mg, Q6H PRN  calcium carbonate, 1,000 mg, Daily PRN  heparin (porcine), 2,000 Units, Q6H PRN  heparin (porcine), 4,000 Units, Q6H PRN  HYDROmorphone, 0.5 mg, Q3H PRN  ondansetron, 4 mg, Q6H PRN  oxyCODONE, 10 mg, Q4H PRN  oxyCODONE, 5 mg, Q4H PRN       Continuous    amiodarone (CORDARONE) 900 mg in dextrose 5 % 500 mL infusion, 0.5 mg/min, Last Rate: 0.5 mg/min (08/24/24 1238)  bumetanide (BUMEX) 12.5 mg infusion 50 mL, 2 mg/hr, Last Rate: 2 mg/hr (08/25/24 0210)  heparin (porcine), 3-20 Units/kg/hr (Order-Specific), Last Rate: 19 Units/kg/hr (08/24/24 1348)  insulin regular (HumuLIN R,NovoLIN R) 1 Units/mL in sodium chloride 0.9 % 100 mL infusion, 0.3-21 Units/hr, Last Rate: 2 Units/hr (08/25/24 0014)  norepinephrine, 1-30 mcg/min, Last Rate: Stopped (08/24/24 1211)         Labs:    CBC    Recent Labs     08/23/24  1939 08/24/24  0403   WBC 28.03* 22.20*   HGB 10.5* 10.4*   HCT 34.6* 34.2*   * 383     BMP    Recent Labs     08/24/24  1551 08/24/24  2203   SODIUM 133* 133*   K 5.3 5.0   CL 94* 93*   CO2 24 25   AGAP 15* 15*   BUN 55* 57*   CREATININE 3.33* 3.38*   CALCIUM 9.4 9.2       Coags    Recent Labs     08/23/24 2155 08/24/24  0201   PTT 61* 62*        Additional Electrolytes  Recent Labs     08/23/24  1939 08/23/24  2155 08/24/24  1003 08/24/24  1551   MG 2.4   < > 2.3 2.5   PHOS 7.2*   < > 7.4* 7.7*   CAIONIZED 1.19  --   --   --     < > = values in this interval not displayed.          Blood Gas    No recent results  Recent Labs     08/24/24  0536   PHVEN 7.237*   VHF4MTY 59.2*   PO2VEN 39.9   GLY9EXA 24.6   BEVEN -3.4   [id number] 59.9*    LFTs  Recent Labs     08/23/24  1939 08/24/24  0403   ALT 49 418*   * 2,452*   ALKPHOS 181* 179*   ALB 3.3* 3.3*   TBILI 0.61 0.71       Infectious  No recent results  Glucose  Recent Labs     08/24/24  0620 08/24/24  1003 08/24/24  1551 08/24/24  2203   GLUC 239* 231* 158* 123                Breonna Santana PA-C

## 2024-08-25 NOTE — PROGRESS NOTES
NEPHROLOGY PROGRESS NOTE   Augustus Coffman 68 y.o. male MRN: [id number]  Unit/Bed#: PPHP-310-01 Encounter: [id number]  Reason for Consult: FINA    ASSESSMENT AND PLAN:  Patient is 68-year-old male with significant medical issues of diabetes, follicular lymphoma, nephrolithiasis, recent MSSA bacteremia, presented with abnormal MRI cervical spine results.  We are consulted for FINA/CKD management.     FINA, baseline creatinine 0.9-1.1  -Creatinine peak level 3.3 yesterday, now plateaued 3.2 today.  -FINA suspect in the setting of septic shock, ischemic/septic ATN, new onset A-fib with RVR, pericardial effusion causing hemodynamic insult.  -Urine output has much improved over last 24 hours with aggressive diuresis.  -Patient has provided dialysis consent which is in the chart.  No emergent need for dialysis yet.  Continue to monitor   -Currently has Villa catheter.  Accurate intake and output  -BMP in a.m.  -UA this admission shows 10-20 RBCs, 2+ proteinuria, currently has Villa catheter     Initial hyperkalemia, now resolved.  Serum potassium 4.8 today.      Hyponatremia, sodium level improving 135 today.    -Strict fluid restriction 1.2 L/day recommended.  Monitor with diuretics.     Septic shock in the setting of MSSA bacteremia, initially required vasopressors, now remains off.    -Currently remains on O2 via nasal cannula.     MSSA bacteremia, in the setting of right foot ulcer, concern for cervical discitis  Cervical discitis/osteomyelitis with paraspinal phlegmon/abscess, management as per ID/ICU team.  Pericardial effusion, currently on steroids as per cardiology/ICU.  No surgical intervention given no signs of tamponade.  Will need repeat echo eventually.    A-fib with RVR, management as per cardiology.  Transaminitis in the setting of shock liver, septic shock.  Seems to be improving.    Discussed above plan in detail with ICU team regarding continuing Bumex drip, monitoring  BMP and they agree with above  recommendations.      SUBJECTIVE:  Patient seen and examined at bedside.  Remains on O2 via nasal cannula.  Denies nausea or vomiting    OBJECTIVE:  Current Weight: Weight - Scale: 123 kg (272 lb)  Vitals:    08/25/24 0720   BP: 135/83   Pulse:    Resp:    Temp: 97.9 °F (36.6 °C)   SpO2: 95%       Intake/Output Summary (Last 24 hours) at 8/25/2024 0817  Last data filed at 8/25/2024 0601  Gross per 24 hour   Intake 1892.27 ml   Output 4055 ml   Net -2162.73 ml     Wt Readings from Last 3 Encounters:   08/24/24 123 kg (272 lb)   08/22/24 118 kg (260 lb)   08/20/24 119 kg (262 lb)     Temp Readings from Last 3 Encounters:   08/25/24 97.9 °F (36.6 °C) (Oral)   08/23/24 97.5 °F (36.4 °C)   08/15/24 97.6 °F (36.4 °C)     BP Readings from Last 3 Encounters:   08/25/24 135/83   08/23/24 (!) 73/50   08/15/24 127/66     Pulse Readings from Last 3 Encounters:   08/25/24 85   08/23/24 (!) 147   08/15/24 65        Physical Examination:  Eyes: Mild conjunctival pallor present  Neck: No obvious lymphadenopathy appreciated  Respiratory: Bilateral air entry present  CVS: Trace edema in legs  GI: Soft, nondistended  CNS: Active, alert, follows commands  Skin: No new rash  Musculoskeletal: No obvious new gross deformity noted    Medications:    Current Facility-Administered Medications:     acetaminophen (TYLENOL) tablet 975 mg, 975 mg, Oral, Q6H SRIDHAR, Ophelia Leon PA-C    amiodarone tablet 200 mg, 200 mg, Oral, BID With Meals, Ophelia Leon PA-C    bumetanide (BUMEX) 12.5 mg infusion 50 mL, 1 mg/hr, Intravenous, Continuous, Breonna Santana PA-C, Last Rate: 4 mL/hr at 08/25/24 0527, 1 mg/hr at 08/25/24 0527    calcium carbonate (TUMS) chewable tablet 1,000 mg, 1,000 mg, Oral, Daily PRN, Breonna Santana PA-C    ceFAZolin (ANCEF) IVPB (premix in dextrose) 2,000 mg 50 mL, 2,000 mg, Intravenous, Q6H, Analisa Faulkner MD, Last Rate: 100 mL/hr at 08/25/24 0514, 2,000 mg at 08/25/24 0514    chlorhexidine (PERIDEX) 0.12 % oral rinse 15 mL,  15 mL, Mouth/Throat, Q12H SRIDHAR, Breonna Santana PA-C, 15 mL at 08/24/24 2106    colchicine (COLCRYS) tablet 0.6 mg, 0.6 mg, Oral, BID, Breonna Santana PA-C, 0.6 mg at 08/24/24 1752    docusate sodium (COLACE) capsule 100 mg, 100 mg, Oral, BID, Breonna Santana PA-C, 100 mg at 08/24/24 1752    heparin (porcine) 25,000 units in 0.45% NaCl 250 mL infusion (premix), 3-20 Units/kg/hr (Order-Specific), Intravenous, Titrated, Breonna Santana PA-C, Last Rate: 17.1 mL/hr at 08/25/24 0520, 19 Units/kg/hr at 08/25/24 0520    heparin (porcine) injection 2,000 Units, 2,000 Units, Intravenous, Q6H PRN, Breonna Santana PA-C    heparin (porcine) injection 4,000 Units, 4,000 Units, Intravenous, Q6H PRN, Breonna Santana PA-C    HYDROmorphone (DILAUDID) injection 0.5 mg, 0.5 mg, Intravenous, Q3H PRN, Breonna Santana PA-C, 0.5 mg at 08/24/24 1301    insulin regular (HumuLIN R,NovoLIN R) 1 Units/mL in sodium chloride 0.9 % 100 mL infusion, 0.3-21 Units/hr, Intravenous, Titrated, Ophelia Leon PA-C, Last Rate: 2 mL/hr at 08/25/24 0743, 2 Units/hr at 08/25/24 0743    methocarbamol (ROBAXIN) tablet 750 mg, 750 mg, Oral, Q6H SRIDHAR, Breonna Santana PA-C, 750 mg at 08/25/24 0219    ondansetron (ZOFRAN) injection 4 mg, 4 mg, Intravenous, Q6H PRN, Breonna Santana PA-C    oxyCODONE (ROXICODONE) immediate release tablet 10 mg, 10 mg, Oral, Q4H PRN, Breonna Santana PA-C, 10 mg at 08/25/24 0019    oxyCODONE (ROXICODONE) IR tablet 5 mg, 5 mg, Oral, Q4H PRN, Breonna Santana PA-C    polyethylene glycol (MIRALAX) packet 17 g, 17 g, Oral, Daily, Breonna Santana PA-C, 17 g at 08/24/24 0825    predniSONE tablet 30 mg, 30 mg, Oral, Daily, Breonna Santana PA-C, 30 mg at 08/24/24 0821    senna (SENOKOT) tablet 8.6 mg, 1 tablet, Oral, Daily, Breonna Santana PA-C, 8.6 mg at 08/24/24 0824    Laboratory Results:  Results from last 7 days   Lab Units 08/25/24  0406 08/25/24  0405 08/24/24  2203 08/24/24  1551 08/24/24  1003 08/24/24  0620  "08/24/24  0403 08/24/24  0201 08/23/24  2155 08/23/24  1939 08/23/24  1634 08/23/24  0906 08/23/24  0632 08/22/24  0550 08/21/24  0521 08/20/24  1417   WBC Thousand/uL  --  17.18*  --   --   --   --  22.20*  --   --  28.03*  --   --  25.32* 14.77* 13.91* 9.61   HEMOGLOBIN g/dL  --  10.2*  --   --   --   --  10.4*  --   --  10.5*  --   --  11.2* 11.2* 11.7* 10.5*   HEMATOCRIT %  --  33.4*  --   --   --   --  34.2*  --   --  34.6*  --   --  36.3* 36.4* 37.0 33.8*   PLATELETS Thousands/uL  --  366  --   --   --   --  383  --   --  430*  --   --  356 303 270 245   SODIUM mmol/L 135  --  133* 133* 132* 131*  --  130* 129* 132* 131*   < >  --  134* 136 137   POTASSIUM mmol/L 4.8  --  5.0 5.3 5.5* 6.1*  --  6.0* 6.0* 5.7* 5.5*   < >  --  5.0 4.6 5.0   CHLORIDE mmol/L 94*  --  93* 94* 96 94*  --  93* 93* 92* 94*   < >  --  97 99 97   CO2 mmol/L 27  --  25 24 22 23  --  23 22 25 21   < >  --  26 26 30   BUN mg/dL 59*  --  57* 55* 54* 55*  --  52* 49* 46* 47*   < >  --  23 27* 39*   CREATININE mg/dL 3.23*  --  3.38* 3.33* 3.06* 3.12*  --  3.04* 3.02* 2.92* 2.91*   < >  --  1.08 1.03 1.36*   CALCIUM mg/dL 9.3  --  9.2 9.4 8.8 9.2  --  9.4 9.4 10.0 9.9   < >  --  10.0 9.7 10.3*   MAGNESIUM mg/dL  --  2.2  --  2.5 2.3  --   --  2.4 2.5 2.4 2.2   < >  --  2.2 2.0 2.2   PHOSPHORUS mg/dL  --  7.1*  --  7.7* 7.4*  --   --  8.1* 7.7* 7.2* 8.0*  --   --   --   --   --     < > = values in this interval not displayed.       XR spine cervical 2 or 3 vw injury    (Results Pending)       Portions of the record may have been created with voice recognition software. Occasional wrong word or \"sound a like\" substitutions may have occurred due to the inherent limitations of voice recognition software. Read the chart carefully and recognize, using context, where substitutions have occurred.    "

## 2024-08-25 NOTE — ASSESSMENT & PLAN NOTE
Developed new onset hypotension in the setting of afib with RVR  Weaned off levophed/vasopressin yesterday

## 2024-08-25 NOTE — PROGRESS NOTES
"Cardiology Progress Note - Team 2  Augustus Coffman 68 y.o. male MRN: [id number]  Unit/Bed#: PPHP-310-01 Encounter: [id number]    Current Problem List   Principal Problem:    MSSA bacteremia  Active Problems:    History of hypertension    Diabetic peripheral neuropathy (HCC)    Type 2 diabetes mellitus (HCC)    FINA (acute kidney injury) on CKD stage 2(HCC)    Acute osteomyelitis of cervical spine (HCC)    Transaminitis    New onset a-fib (HCC)    Hyperkalemia    Acute pericarditis    Diabetic ulcer of right foot (HCC)    Acute respiratory insufficiency    Hypotension    Acute neck pain      Assessment:    Acute pericarditis with moderate pericardial effusion  Paroxysmal atrial fibrillation  Distributive Shock  MSSA bacteremia  Osteomyelitis and cervical discitis  Moderate aortic stenosis  Acute kidney injury  Hypertension  Diabetes mellitus  Diabetic ulcer of right foot      Plan:  [id number] 3, continue heparin drip, if no surgical planning, can transition to DOAC.  Heart rate is controlled, continue amiodarone.   2.  Repeat echo on 8/24/2024 revealed LVEF 45%, reduced EF may due to sepsis induced cardiomyopathy.  Moderate pericardial effusion, no evidence of cardiac tamponade, due to FINA, patient is on colchicine and prednisone  3.  Blood culture on 8/23/2024 negative at 24 hours. Patient is on pressor and 8L of oxygen. Hold LUCRECIA.       Subjective:   Patient seen and examined.  No significant events overnight. Patient is on 8 L of NC, Mild left sided chest pain.     Objective:     Vitals: Blood pressure 135/83, pulse 88, temperature 97.9 °F (36.6 °C), temperature source Oral, resp. rate (!) 23, height 6' 3\" (1.905 m), weight 122 kg (268 lb 8.3 oz), SpO2 95%., Body mass index is 33.56 kg/m².,   Orthostatic Blood Pressures      Flowsheet Row Most Recent Value   Blood Pressure 135/83 filed at 08/25/2024 0720   Patient Position - Orthostatic VS Sitting filed at 08/25/2024 0720              Intake/Output Summary (Last " 24 hours) at 8/25/2024 1025  Last data filed at 8/25/2024 0820  Gross per 24 hour   Intake 1730.1 ml   Output 4545 ml   Net -2814.9 ml       Physical Exam  Constitutional:       General: He is not in acute distress.     Appearance: He is not ill-appearing.   HENT:      Mouth/Throat:      Mouth: Mucous membranes are moist.   Eyes:      Pupils: Pupils are equal, round, and reactive to light.   Cardiovascular:      Rate and Rhythm: Normal rate and regular rhythm.      Heart sounds: No murmur heard.  Pulmonary:      Effort: Pulmonary effort is normal.      Breath sounds: No rales.   Abdominal:      General: There is no distension.      Tenderness: There is no abdominal tenderness.   Musculoskeletal:      Right lower leg: Edema present.      Left lower leg: Edema present.   Skin:     General: Skin is warm.   Neurological:      Mental Status: He is alert and oriented to person, place, and time.   Psychiatric:         Mood and Affect: Mood normal.          Medications:      Current Facility-Administered Medications:     acetaminophen (TYLENOL) tablet 975 mg, 975 mg, Oral, Q6H [facility], Ophelia Leon PA-C, 975 mg at 08/25/24 1024    amiodarone tablet 200 mg, 200 mg, Oral, BID With Meals, Ophelia Leon PA-C, 200 mg at 08/25/24 0827    bumetanide (BUMEX) 12.5 mg infusion 50 mL, 1 mg/hr, Intravenous, Continuous, Breonna Santana PA-C, Last Rate: 4 mL/hr at 08/25/24 0527, 1 mg/hr at 08/25/24 0527    calcium carbonate (TUMS) chewable tablet 1,000 mg, 1,000 mg, Oral, Daily PRN, Breonna Santana PA-C    ceFAZolin (ANCEF) IVPB (premix in dextrose) 2,000 mg 50 mL, 2,000 mg, Intravenous, Q6H, Analisa Faulkner MD, Last Rate: 100 mL/hr at 08/25/24 0514, 2,000 mg at 08/25/24 0514    chlorhexidine (PERIDEX) 0.12 % oral rinse 15 mL, 15 mL, Mouth/Throat, Q12H [facility], Breonna Santana PA-C, 15 mL at 08/25/24 0827    colchicine (COLCRYS) tablet 0.6 mg, 0.6 mg, Oral, BID, Breonna Santana PA-C, 0.6 mg at 08/25/24 0827    docusate sodium (COLACE)  capsule 100 mg, 100 mg, Oral, BID, Breonna Santana PA-C, 100 mg at 08/25/24 0827    heparin (porcine) 25,000 units in 0.45% NaCl 250 mL infusion (premix), 3-20 Units/kg/hr (Order-Specific), Intravenous, Titrated, Breonna Santana PA-C, Last Rate: 17.1 mL/hr at 08/25/24 0520, 19 Units/kg/hr at 08/25/24 0520    heparin (porcine) injection 2,000 Units, 2,000 Units, Intravenous, Q6H PRN, Breonna Santana PA-C    heparin (porcine) injection 4,000 Units, 4,000 Units, Intravenous, Q6H PRN, Breonna Santana PA-C    HYDROmorphone (DILAUDID) injection 0.5 mg, 0.5 mg, Intravenous, Q3H PRN, Breonna Santana PA-C, 0.5 mg at 08/24/24 1301    insulin regular (HumuLIN R,NovoLIN R) 1 Units/mL in sodium chloride 0.9 % 100 mL infusion, 0.3-21 Units/hr, Intravenous, Titrated, Ophelia Leon PA-C, Last Rate: 2 mL/hr at 08/25/24 0743, 2 Units/hr at 08/25/24 0743    methocarbamol (ROBAXIN) tablet 750 mg, 750 mg, Oral, Q6H [facility], Breonna Santana PA-C, 750 mg at 08/25/24 0827    ondansetron (ZOFRAN) injection 4 mg, 4 mg, Intravenous, Q6H PRN, Breonna Santana PA-C    oxyCODONE (ROXICODONE) immediate release tablet 10 mg, 10 mg, Oral, Q4H PRN, Breonna Santana PA-C, 10 mg at 08/25/24 0019    oxyCODONE (ROXICODONE) IR tablet 5 mg, 5 mg, Oral, Q4H PRN, Breonna Santana PA-C    polyethylene glycol (MIRALAX) packet 17 g, 17 g, Oral, Daily, Breonna Santana PA-C, 17 g at 08/25/24 0827    predniSONE tablet 30 mg, 30 mg, Oral, Daily, Breonna Santana PA-C, 30 mg at 08/25/24 0827    senna (SENOKOT) tablet 8.6 mg, 1 tablet, Oral, Daily, Breonna Santana PA-C, 8.6 mg at 08/25/24 0827     Labs & Results:  Results from last 7 days   Lab Units 08/24/24  0201   CK TOTAL U/L 22*     Results from last 7 days   Lab Units 08/25/24  0405 08/24/24  0403 08/23/24  1939   WBC Thousand/uL 17.18* 22.20* 28.03*   HEMOGLOBIN g/dL 10.2* 10.4* 10.5*   HEMATOCRIT % 33.4* 34.2* 34.6*   PLATELETS Thousands/uL 366 383 430*         Results from last 7 days   Lab  Units 08/25/24  0406 08/25/24  0405 08/24/24  2203 08/24/24  1551 08/24/24  0620 08/24/24  0403 08/23/24  2155 08/23/24  1939   POTASSIUM mmol/L 4.8  --  5.0 5.3   < >  --    < > 5.7*   CHLORIDE mmol/L 94*  --  93* 94*   < >  --    < > 92*   CO2 mmol/L 27 -- 25 24   < >  --    < > 25   BUN mg/dL 59*  --  57* 55*   < >  --    < > 46*   CREATININE mg/dL 3.23*  --  3.38* 3.33*   < >  --    < > 2.92*   CALCIUM mg/dL 9.3  --  9.2 9.4   < >  --    < > 10.0   ALK PHOS U/L  --  188*  --   --   --  179*  --  181*   ALT U/L  --  81*  --   --   --  418*  --  49   AST U/L  --  917*  --   --   --  2,452*  --  162*    < > = values in this interval not displayed.     Results from last 7 days   Lab Units 08/25/24  0405 08/24/24  0201 08/23/24 2155 08/22/24  1552 08/22/24  0926   INR  1.56*  --   --   --  1.21*   PTT seconds 61* 62* 61*   < > 40*    < > = values in this interval not displayed.     Results from last 7 days   Lab Units 08/25/24  0405 08/24/24  1551 08/24/24  1003   MAGNESIUM mg/dL 2.2 2.5 2.3         Dimitrios Steve MD  Cardiology Fellow   PGY-4

## 2024-08-25 NOTE — ASSESSMENT & PLAN NOTE
Lab Results   Component Value Date    HGBA1C 6.8 (H) 07/05/2024       Recent Labs     08/24/24  1553 08/24/24  1756 08/24/24  1953 08/24/24  2203   POCGLU 161* 130 125 115         Blood Sugar Average: Last 72 hrs:  (P) [id number]  Home regimen: metformin  Insulin infusion for better glucose control   Goal blood glucose 140-180  Transition back to [facility] once tolerating PO diet

## 2024-08-25 NOTE — PROGRESS NOTES
Cervical upright Xrays completed. Final report pending.     Personally reviewed. Bony erosive changes at C5, C6 with straightening and slight reversal of cervical lordosis.     Patient should be in a collar at all times to assist with alignment while being treated for osteomyelitis/discitis.   Ordered VISTA collar to see if better tolerated. Mirlande collar for showering.     Ongoing medical management.   Contact neurosurgery with any worsening neck pain or new radicular of myelopathic complaints.     Follow-up in clinic in 4 weeks with repeat upright Xrays.

## 2024-08-25 NOTE — CONSULTS
Podiatry - Consultation    Patient Information:   Augustus Coffman 68 y.o. male MRN: [id number]  Unit/Bed#: PPHP-310-01 Encounter: [id number]  PCP: Gwen Lazo DO  Date of Admission:  8/23/2024  Date of Consultation: 08/25/24  Requesting Physician: Sylvain Torres DO      ASSESSMENT:    Augustus Coffman is a 68 y.o. male with:    Right full-thickness diabetic foot ulceration submetatarsal 1, stable  Type 2 diabetes mellitus  Septic shock  MSSA bacteremia  Pericarditis  Atrial fibrillation  Acute kidney injury  UTI    PLAN:    Patient was seen and evaluated at bedside today.  Of note there is a full-thickness ulceration to the submet 1 region of the right foot.  Ulceration is clinically stable and does not probe to bone.  Reviewed radiographs from 8/20/2024, stable hardware noted with healed osteotomy of first metatarsal, no displaced fracture or dislocation.  Arthritic change of the first MTPJ  Reviewed labs/vitals, of note patient is leukocytic with a white count of 17.1 and afebrile.  Patient is being managed on Ancef at this time.  Plan to continue local wound care to right diabetic foot ulcer.  Ulcer is currently stable and likely not a current source of bacteremia although it has been noted to be likely that it was a previous source.  No concrete plans for podiatric surgical intervention at this time, we will discuss plan with attending's and update as needed  Placed order for surgical shoe for right foot.  Continue local wound care to right submet 1 ulceration.  Assistance is appreciated with nurse dressing changes  Podiatry will continue to check on patient and right foot ulcer while in-house  Elevation on green foam wedges or pillows when non-ambulatory  Rest of care per primary team.    Weightbearing status: Weightbearing as tolerated to right lower extremity in surgical shoe    SUBJECTIVE:    History of Present Illness:    Augustus Coffman is a 68 y.o. male who is originally admitted 8/23/2024 due to  MSSA bacteremia, septic shock, acute kidney injury. Patient has a past medical history of type 2 diabetes mellitus, history of MRSA bacteria, peripheral neuropathy, acute kidney injury on chronic kidney disease, chronic diabetic ulcer of right foot.. We are consulted for chronic diabetic ulcer right foot.  Patient is well-known to Dr. Luu, his podiatrist, who has been taking care of him for years. This ulceration has been previously much worse and grew MSSA. Patient is currently in CCU for complications of MSSA bacteremia     Review of Systems:    Musculoskeletal: Negative  Skin: Right submet 1 ulcer  Neurological: Negative  Psych: Negative.     Past Medical and Surgical History:     Past Medical History:   Diagnosis Date    Arthritis 2010's    Occasionally flares up    Cancer (HCC) May 2021    Still investigating    Chronic kidney disease     Diabetes mellitus (HCC)     Follicular lymphoma (HCC)     GERD (gastroesophageal reflux disease) June 2021    Noticed in an Endoscopy    Heart murmur May 2020    High cholesterol     Hypertension     Kidney stone 1980's    Have had since 1980's    Obesity Since Childhood       Past Surgical History:   Procedure Laterality Date    BUNIONECTOMY Right 11/24/2020    Procedure: RIGHT HAV CORRECTION,;  Surgeon: Niranjan Child DPM;  Location: [facility];  Service: Podiatry    COLONOSCOPY      INCISION AND DRAINAGE OF WOUND Right 10/05/2016    Procedure: INCISION AND DRAINAGE (I&D) EXTREMITY;  Surgeon: Niranjan Child DPM;  Location: [facility];  Service:     IR BIOPSY LYMPH NODE  07/08/2021    IR EMBOLIZATION (SPECIFY VESSEL OR SITE)  07/08/2021    IR PORT PLACEMENT  9/1/2022    PILONIDAL CYST EXCISION      OH CORRECTION HAMMERTOE Right 02/19/2019    Procedure: THIRD HAMMER TOE CORRECTION;  Surgeon: Niranjan Child DPM;  Location: [facility];  Service: Podiatry    OH RMVL MATEO CTR VAD W/SUBQ PORT/ CTR/PRPH INSJ N/A 3/14/2023    Procedure: REMOVAL VENOUS PORT (PORT-A-CATH)IR;  Surgeon:  Avelino Vázquez DO;  [facility];  Service: Interventional Radiology    TOE OSTEOTOMY Right 03/14/2017    Procedure: HAMMERTOE CORRECTION R 2 ;  Surgeon: Niranjan Child DPM;  Location: [facility];  Service:     TOE OSTEOTOMY Left 11/24/2020    Procedure: LEFT HT CORRECTION TOE;  Surgeon: Niranjan Child DPM;  Location: [facility];  Service: Podiatry    TONSILLECTOMY  1963       Meds/Allergies:      Medications Prior to Admission:     allopurinol (ZYLOPRIM) 300 mg tablet    amLODIPine (NORVASC) 10 mg tablet    aspirin 81 mg chewable tablet    Cholecalciferol 100 MCG (4000 UT) CAPS    cyclobenzaprine (FLEXERIL) 10 mg tablet    Diclofenac Sodium (VOLTAREN) 1 %    Diclofenac Sodium (VOLTAREN) 1 %    Diclofenac Sodium (VOLTAREN) 1 %    Empagliflozin (Jardiance) 25 MG TABS    hydroCHLOROthiazide 25 mg tablet    ketorolac (TORADOL) 10 mg tablet    lidocaine (Lidoderm) 5 %    lidocaine (LMX) 4 % cream    losartan (COZAAR) 100 MG tablet    metFORMIN (GLUCOPHAGE) 500 mg tablet    metoprolol succinate (TOPROL-XL) 100 mg 24 hr tablet    Multiple Vitamins-Minerals (Centrum Silver 50+Men) TABS    oxyCODONE (Roxicodone) 5 immediate release tablet    rosuvastatin (CRESTOR) 40 MG tablet    tiZANidine (ZANAFLEX) 4 mg tablet    Allergies   Allergen Reactions    Lisinopril Cough       Social History:     Marital Status: /Civil Union    Substance Use History:   Social History     Substance and Sexual Activity   Alcohol Use Never     Social History     Tobacco Use   Smoking Status Never   Smokeless Tobacco Never   Tobacco Comments    Never smoked but exposed to second hand smoke from birth until 1980's     Social History     Substance and Sexual Activity   Drug Use Never       Family History:    Family History   Problem Relation Age of Onset    Cancer Father         Leukemia         OBJECTIVE:    Vitals:   Blood Pressure: 120/71 (08/25/24 1541)  Pulse: 83 (08/25/24 1400)  Temperature: 98.1 °F (36.7 °C) (08/25/24  "1541)  Temp Source: Oral (08/25/24 1541)  Respirations: (!) 29 (08/25/24 1400)  Height: 6' 3\" (190.5 cm) (08/24/24 0903)  Weight - Scale: 122 kg (268 lb 8.3 oz) (08/25/24 0600)  SpO2: 96 % (08/25/24 1400)    Physical Exam:    General Appearance: Alert, cooperative, no distress.  Psychiatric: AAOx3  Lower Extremity:  Vascular:   Right DP and PT pulses are palpable. Left DP and PT pulses are palpable. CRT < 3 seconds at the digits. + 1/4 edema noted at bilateral lower extremities. Pedal hair is absent. Skin temperature is WNL bilaterally.    Musculoskeletal:  MMT is 5/5 in all muscle compartments bilaterally. ROM at the 1st MPJ and ankle joint are reduced bilaterally with the leg extended.  No pain on palpation of first MTPJ right foot. No gross deformities noted.     Dermatological:  Lower extremity wound(s) as noted below:    Wound #: 1  Location: Plantar medial submet 1  Length 0.4 cm: Width 0.5 cm: Depth 0.1 cm:   Deepest Tissue Noted in Base: Subcutaneous  Probe to Bone: No  Peripheral Skin Description: Attached  Granulation: 100% Fibrotic Tissue: 0% Necrotic Tissue: 0%   Drainage Amount: None  Signs of Infection: None    No wounds noted to left lower extremity    Neurological:  Gross sensation is intact. Protective sensation is diminished. Patient Reports numbness and/or paresthesias.    Clinical Images 08/25/24:      Additional data:     Lab Results: I have personally reviewed pertinent labs including:    Results from last 7 days   Lab Units 08/25/24  0405 08/24/24  0403   WBC Thousand/uL 17.18* 22.20*   HEMOGLOBIN g/dL 10.2* 10.4*   HEMATOCRIT % 33.4* 34.2*   PLATELETS Thousands/uL 366 383   SEGS PCT %  --  89*   LYMPHO PCT %  --  3*   MONO PCT %  --  6   EOS PCT %  --  0     Results from last 7 days   Lab Units 08/25/24  1009 08/25/24  0406 08/25/24  0405   POTASSIUM mmol/L 4.5   < >  --    CHLORIDE mmol/L 93*   < >  --    CO2 mmol/L 27   < >  --    BUN mg/dL 63*   < >  --    CREATININE mg/dL 3.13*   < >  --  " "  CALCIUM mg/dL 9.3   < >  --    ALK PHOS U/L  --   --  188*   ALT U/L  --   --  81*   AST U/L  --   --  917*    < > = values in this interval not displayed.     Results from last 7 days   Lab Units 08/25/24  0405   INR  1.56*       Cultures: I have personally reviewed pertinent cultures including:    Results from last 7 days   Lab Units 08/23/24  2055 08/23/24 2011 08/22/24  0606 08/21/24  0103 08/20/24  1432 08/20/24  1430 08/20/24  1417   BLOOD CULTURE  No Growth at 24 hrs. No Growth at 24 hrs. No Growth at 72 hrs.  No Growth at 72 hrs.  --  Staphylococcus aureus* Staphylococcus aureus* Staphylococcus aureus*   GRAM STAIN RESULT   --   --   --   --  Gram positive cocci in clusters* Gram positive cocci in clusters* Gram positive cocci in clusters*   URINE CULTURE   --   --   --  10,000-19,000 cfu/ml Enterococcus faecalis*  --   --   --                ** Please Note: Portions of the record may have been created with voice recognition software. Occasional wrong word or \"sound a like\" substitutions may have occurred due to the inherent limitations of voice recognition software. Read the chart carefully and recognize, using context, where substitutions have occurred. **    "

## 2024-08-25 NOTE — ASSESSMENT & PLAN NOTE
8/22 Echo: Limited echo for assessment of endocarditis.  The patient's aortic valve is difficult to assess due to calcification but there is no new significant regurgitation.  The mitral valve has hyperechoic thickening at the tips consistent with most likely calcification, these were seen on the echocardiograms in July.  The tricuspid valve similarly was not well-visualized but no new regurgitation.  The pulmonary valve was not well-visualized. Off note patient has new moderate pericardial effusion without specific echocardiographic signs of tamponade  Patient with symptoms consistent with pericarditis    Plan:  Cardiology consulted  Colchicine 0.6 mg BID  Unable to initiate NSAIDs 2/2 FINA  Prednisone 30 mg daily  Close hemodynamic monitoring

## 2024-08-25 NOTE — ASSESSMENT & PLAN NOTE
Developed new onset afib 8/21/8/22  Initiated on heparin infusion and metoprolol  Unable to tolerate metoprolol 2/2 hypotension  Received amiodarone bolus and initiated on infusion    Plan:  Amiodarone infusion - transition to PO  Continue heparin drip

## 2024-08-25 NOTE — RESTORATIVE TECHNICIAN NOTE
Restorative Technician Note      Patient Name: Augustus Coffman     Restorative Tech Visit Date: 08/25/24  Note Type: Bracing, Initial consult  Patient Position Upon Consult: Bedside chair  Brace Applied: [place] Collar Set (Chin plate set level 2)  Additional Brace Ordered: No  Patient Position When Brace Applied: Seated  Bracing Recommendations: None  Education Provided: Yes; Family or social support of family present for education by provider  Patient Position at End of Consult: Bedside chair; Bed/Chair alarm activated; All needs within reach  Nurse Communication: Nurse aware of consult, application of brace    Mirlande shower collar, replacement pads, and educational handout reviewed and left at bedside.    Please contact [facility] Restorative tech on Epic Secure Chat  in regards to bracing instruction and/or adjustment.    Denice Goldstein Restorative Tech

## 2024-08-25 NOTE — ASSESSMENT & PLAN NOTE
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 3 mm epidural phlegmon/small abscess is also suspected. There is moderate resultant central stenosis and mild mass effect on the cord  Neurosurgery consulted - recommended C-collar at all times but patient unable to tolerate  Upright xrays  Recommending 6 weeks IV antibiotics and follow-up repeat imaging  Q4h neuro checks

## 2024-08-25 NOTE — ASSESSMENT & PLAN NOTE
Currently requiring MFNC  Appears related to low lung volumes 2/2 pain  Wean O2 as tolerated to maintain Spo2 > 92%  IS, OOB

## 2024-08-25 NOTE — ASSESSMENT & PLAN NOTE
Baseline Cr 1-1.2  Developed FINA 8/23 with associated hyperkalemia    Plan:  Nephrology following, appreciate their recommendations  Maintain avilez  Avoid nephrotoxins  Strict I/Os  Monitor renal function/Cr every 6 hours   Continue diuresis

## 2024-08-25 NOTE — ASSESSMENT & PLAN NOTE
Etiology likely 2/2 right foot ulcer  Complicated by cervical discitis  Echo without evidence of endocarditis    Plan:  ID consulted, appreciate their recommendations  Plan for ancef x 6 weeks  Repeat blood cultures sent given episodes of diaphoresis/chills  MRSA swab pending  UA with micro unremarkable for infection   Consider LUCRECIA given poor visualization of valves

## 2024-08-25 NOTE — ASSESSMENT & PLAN NOTE
Developed FINA with hyperkalemia  Received medical therapy with insulin, calcium, bicarb, lasix, lokelma  Trending down currently with bumex drip  Trend BMP q6h

## 2024-08-25 NOTE — ASSESSMENT & PLAN NOTE
Hx HTN on norvasc, HCTZ, losartan, Toprol-XL  All currently on hold given hypotension  Statin on hold 2/2 transaminitis  Aspirin on hold - discuss restarting

## 2024-08-25 NOTE — PROGRESS NOTES
Follow up Consultation    Nephrology   Augustus OSBORN Augustus 68 y.o. male MRN: [id number]  Unit/Bed#: PPHP-310-01 Encounter: [id number]      Physician Requesting Consult: Sylvain Torres DO        ASSESSMENT:  68-year-old male with multiple comorbidities including diabetes, follicular lymphoma and recent MSSA bacteremia presented with abnormal MRI cervical spine as an outpatient.  Nephrology following for acute kidney injury.      Acute kidney injury :   FINA most likely secondary to ischemic injury secondary to ischemic injury secondary to hemodynamic perturbations plus new onset A-fib plus septic shock with subsequent ATN.  After review of records it appears that the patient has a baseline Creatinine of 0.9-1.1 mg/dL.  Admission creatinine of 1.36 mg/dL on 8/20/2024.  Creatinine today is at 2.73 mg/dL.  Dialysis consent in chart in case needed per my prior colleague  Monitor for dialysis needs  check BMP, magnesium, phosphorus in a.m.  Await renal recovery.  Place on a renal diet when allowed diet order.   Strict I/O.  Daily weights  Urinary retention protocol  Avoid nephrotoxins, adjust meds to appropriate GFR.    H&H/anemia:  most recent hemoglobin at 11.2 g/dL  Maintain hemoglobin greater than 8 grams/deciliter    Acid-base electrolytes:  Hyponatremia: Restrict 1.2 L/day.  Most recent sodium stable 139 mill equivalents.  Resolved  Hyperkalemia: Resolved most recent potassium 4 mEq stable    Blood pressure/A-fib/volume overload:   Optimize hemodynamic status to avoid delay in renal recovery.  Maintain MAP > 65mmHg  Avoid BP fluctuations.  Home medications:  Current medications: Bumex 1 mg IV 3 times daily  Okay to decrease Bumex to 1 mg IV twice daily for now likely consider decreasing to daily dosage or p.o. from tomorrow    Other medical problems:  MSSA bacteremia:  Management per primary team.  In the setting of right foot ulcer concern for cervical discitis cervical osteomyelitis with paraspinal phlegmon/abscess.  On  "Ancef for total of 6 weeks  Pericardial effusion: On steroids per ICU.  No surgical  intervention no evidence of tamponade will need repeat echo.  A-fib with RVR: Follow-up with cardiology    Plan below is what was discussed with the primary team that they agree with:  check BMP, magnesium, phosphorus in a.m.  Agree with decreasing Bumex to 1 mg IV twice daily for now  Likely change over to p.o. Bumex from tomorrow  No acute indication for dialysis at this time    Thanks for the consult  Will continue to follow  Please call with questions/ concerns.      Yokasta Flores MD, FASN, 2024, 12:25 PM                Objective :         PHYSICAL EXAM  /64   Pulse (!) 125   Temp 97.7 °F (36.5 °C) (Oral)   Resp 19   Ht 6' 3\" (1.905 m)   Wt 122 kg (268 lb 8.3 oz)   SpO2 97%   BMI 33.56 kg/m²   Temp (24hrs), Av.9 °F (36.6 °C), Min:97.7 °F (36.5 °C), Max:98.1 °F (36.7 °C)        Intake/Output Summary (Last 24 hours) at 2024 1225  Last data filed at 2024 0800  Gross per 24 hour   Intake 580.45 ml   Output 6700 ml   Net -6119.55 ml       I/O last 24 hours:  In: 1024.8 [P.O.:280; I.V.:644.8; IV Piggyback:100]  Out: 8125 [Urine:8125]      Current Weight: Weight - Scale: 122 kg (268 lb 8.3 oz)  First Weight: Weight - Scale: 121 kg (267 lb 13.7 oz)  Physical Exam  Vitals and nursing note reviewed.   Constitutional:       General: He is not in acute distress.     Appearance: Normal appearance. He is obese. He is not ill-appearing, toxic-appearing or diaphoretic.   HENT:      Head: Normocephalic and atraumatic.      Mouth/Throat:      Pharynx: No oropharyngeal exudate.   Eyes:      General: No scleral icterus.  Neck:      Comments: Neck collar in place  Cardiovascular:      Rate and Rhythm: Normal rate.   Pulmonary:      Effort: No respiratory distress.      Breath sounds: No stridor. No wheezing.   Abdominal:      General: There is no distension.      Palpations: Abdomen is soft. There is no mass.      " Tenderness: There is no abdominal tenderness.   Musculoskeletal:         General: No swelling.      Cervical back: Rigidity present.   Skin:     Coloration: Skin is not jaundiced.   Neurological:      General: No focal deficit present.      Mental Status: He is alert and oriented to person, place, and time. Mental status is at baseline.   Psychiatric:         Mood and Affect: Mood normal.         Behavior: Behavior normal.             Review of Systems   Constitutional:  Negative for chills and fatigue.   HENT:  Negative for congestion.    Respiratory:  Negative for cough and shortness of breath.    Cardiovascular:  Negative for leg swelling.   Gastrointestinal:  Negative for abdominal pain.   Genitourinary:  Negative for decreased urine volume.   Musculoskeletal:  Negative for back pain.   Neurological:  Negative for headaches.   Psychiatric/Behavioral:  Negative for agitation and confusion.    All other systems reviewed and are negative.      Scheduled Meds:  Current Facility-Administered Medications   Medication Dose Route Frequency Provider Last Rate    acetaminophen  975 mg Oral Q6H SRIDHAR Leon PA-C      amiodarone  200 mg Oral BID With Meals Ophelia Leon PA-C      bumetanide  1 mg Intravenous BID TANVIR Go      calcium carbonate  1,000 mg Oral Daily PRN Breonna Santana PA-C      cefazolin  2,000 mg Intravenous Q6H Analisa Faulkner MD 2,000 mg (08/26/24 1200)    chlorhexidine  15 mL Mouth/Throat Q12H [facility] Breonna Santana PA-C      colchicine  0.6 mg Oral BID Breonna Santana PA-C      docusate sodium  100 mg Oral BID Breonna Santana PA-C      heparin (porcine)  3-20 Units/kg/hr (Order-Specific) Intravenous Titrated TANVIR Go 21 Units/kg/hr (08/26/24 1159)    heparin (porcine)  2,000 Units Intravenous Q6H PRN Breonna Santana PA-C      heparin (porcine)  4,000 Units Intravenous Q6H PRN Breonna Santana PA-C      HYDROmorphone  0.5 mg Intravenous Q3H PRN Breonna  NITESH Santana      insulin regular (HumuLIN R,NovoLIN R) 1 Units/mL in sodium chloride 0.9 % 100 mL infusion  0.3-21 Units/hr Intravenous Titrated Ophelia Leon PA-C 4 Units/hr (08/26/24 1010)    methocarbamol  750 mg Oral Q6H SRIDHAR Breonna Santana PA-C      ondansetron  4 mg Intravenous Q6H PRN Breonna Santana PA-C      oxyCODONE  10 mg Oral Q4H PRN Breonna Santana PA-C      oxyCODONE  5 mg Oral Q4H PRN Breonna Santana PA-C      polyethylene glycol  17 g Oral Daily Breonna Santana PA-C      predniSONE  30 mg Oral Daily Breonna Santana PA-C      senna  1 tablet Oral Daily Breonna Santana PA-C         PRN Meds:.  calcium carbonate    heparin (porcine)    heparin (porcine)    HYDROmorphone    ondansetron    oxyCODONE    oxyCODONE    Continuous Infusions:heparin (porcine), 3-20 Units/kg/hr (Order-Specific), Last Rate: 21 Units/kg/hr (08/26/24 1159)  insulin regular (HumuLIN R,NovoLIN R) 1 Units/mL in sodium chloride 0.9 % 100 mL infusion, 0.3-21 Units/hr, Last Rate: 4 Units/hr (08/26/24 1010)          Invasive Devices:     Invasive Devices       Central Venous Catheter Line  Duration             CVC Central Lines 08/23/24 Triple 20cm 2 days              Peripheral Intravenous Line  Duration             Peripheral IV 08/23/24 Left Antecubital 2 days              Arterial Line  Duration             Arterial Line 08/23/24 Radial 2 days              Drain  Duration             Urethral Catheter 3 days                      LABORATORY:    Results from last 7 days   Lab Units 08/26/24  0508 08/26/24  0455 08/25/24  1629 08/25/24  1009 08/25/24  0406 08/25/24  0405 08/24/24  2203 08/24/24  1551 08/24/24  1003 08/24/24  0620 08/24/24  0403 08/24/24  0201 08/23/24  2155 08/23/24  1939 08/23/24  1634 08/23/24  0906 08/23/24  0632 08/22/24  0550 08/21/24  0521   WBC Thousand/uL  --  14.22*  --   --   --  17.18*  --   --   --   --  22.20*  --   --  28.03*  --   --  25.32* 14.77* 13.91*   HEMOGLOBIN g/dL  --  11.2*  --    "--   --  10.2*  --   --   --   --  10.4*  --   --  10.5*  --   --  11.2* 11.2* 11.7*   HEMATOCRIT %  --  36.9  --   --   --  33.4*  --   --   --   --  34.2*  --   --  34.6*  --   --  36.3* 36.4* 37.0   PLATELETS Thousands/uL  --  434*  --   --   --  366  --   --   --   --  383  --   --  430*  --   --  356 303 270   POTASSIUM mmol/L 4.0  --  4.3 4.5 4.8  --  5.0 5.3 5.5*   < >  --  6.0* 6.0* 5.7* 5.5*   < >  --  5.0 4.6   CHLORIDE mmol/L 91*  --  91* 93* 94*  --  93* 94* 96   < >  --  93* 93* 92* 94*   < >  --  97 99   CO2 mmol/L 30  --  28 27 27  --  25 24 22   < >  --  23 22 25 21   < >  --  26 26   BUN mg/dL 70*  --  66* 63* 59*  --  57* 55* 54*   < >  --  52* 49* 46* 47*   < >  --  23 27*   CREATININE mg/dL 2.73*  --  3.05* 3.13* 3.23*  --  3.38* 3.33* 3.06*   < >  --  3.04* 3.02* 2.92* 2.91*   < >  --  1.08 1.03   CALCIUM mg/dL 9.7  --  9.4 9.3 9.3  --  9.2 9.4 8.8   < >  --  9.4 9.4 10.0 9.9   < >  --  10.0 9.7   MAGNESIUM mg/dL 2.3  --   --   --   --  2.2  --  2.5 2.3  --   --  2.4 2.5 2.4 2.2   < >  --  2.2 2.0   PHOSPHORUS mg/dL  --   --   --   --   --  7.1*  --  7.7* 7.4*  --   --  8.1* 7.7* 7.2* 8.0*  --   --   --   --     < > = values in this interval not displayed.      rest all reviewed    RADIOLOGY:  XR spine cervical 2 or 3 vw injury   Final Result by Bill Ferreira MD (08/26 0722)      Destruction of the C5 to space with collapse of the superior endplate of C6 consistent with discitis osteomyelitis which has progressed from the prior studies.      Increased soft tissue swelling anterior to C5-6 level.      Left central venous catheter terminating at the confluence of the innominate veins.         Workstation performed: [id number]         XR spine cervical 2 or 3 vw injury    (Results Pending)   CT chest abdomen pelvis wo contrast    (Results Pending)     Rest all reviewed    Portions of the record may have been created with voice recognition software. Occasional wrong word or \"sound a like\" " substitutions may have occurred due to the inherent limitations of voice recognition software. Read the chart carefully and recognize, using context, where substitutions have occurred.If you have any questions, please contact the dictating provider.

## 2024-08-25 NOTE — ASSESSMENT & PLAN NOTE
Lab Results   Component Value Date    HGBA1C 6.8 (H) 07/05/2024       Recent Labs     08/24/24  1553 08/24/24  1756 08/24/24 1953 08/24/24 2203   POCGLU 161* 130 125 115         Blood Sugar Average: Last 72 hrs:  (P) [id number]

## 2024-08-26 ENCOUNTER — APPOINTMENT (INPATIENT)
Dept: RADIOLOGY | Facility: HOSPITAL | Age: 69
DRG: 853 | End: 2024-08-26
Payer: MEDICARE

## 2024-08-26 ENCOUNTER — TRANSITIONAL CARE MANAGEMENT (OUTPATIENT)
Dept: FAMILY MEDICINE CLINIC | Facility: CLINIC | Age: 69
End: 2024-08-26

## 2024-08-26 ENCOUNTER — TELEPHONE (OUTPATIENT)
Age: 69
End: 2024-08-26

## 2024-08-26 LAB
ALBUMIN SERPL BCG-MCNC: 3.3 G/DL (ref 3.5–5)
ALP SERPL-CCNC: 302 U/L (ref 34–104)
ALT SERPL W P-5'-P-CCNC: 33 U/L (ref 7–52)
ANION GAP SERPL CALCULATED.3IONS-SCNC: 18 MMOL/L (ref 4–13)
APTT PPP: 59 SECONDS (ref 23–34)
APTT PPP: 68 SECONDS (ref 23–34)
APTT PPP: 69 SECONDS (ref 23–34)
AST SERPL W P-5'-P-CCNC: 513 U/L (ref 13–39)
BASOPHILS # BLD AUTO: 0.03 THOUSANDS/ÂΜL (ref 0–0.1)
BASOPHILS NFR BLD AUTO: 0 % (ref 0–1)
BILIRUB DIRECT SERPL-MCNC: 0.28 MG/DL (ref 0–0.2)
BILIRUB SERPL-MCNC: 0.45 MG/DL (ref 0.2–1)
BUN SERPL-MCNC: 70 MG/DL (ref 5–25)
CALCIUM SERPL-MCNC: 9.7 MG/DL (ref 8.4–10.2)
CHLORIDE SERPL-SCNC: 91 MMOL/L (ref 96–108)
CO2 SERPL-SCNC: 30 MMOL/L (ref 21–32)
CREAT SERPL-MCNC: 2.73 MG/DL (ref 0.6–1.3)
EOSINOPHIL # BLD AUTO: 0.06 THOUSAND/ÂΜL (ref 0–0.61)
EOSINOPHIL NFR BLD AUTO: 0 % (ref 0–6)
ERYTHROCYTE [DISTWIDTH] IN BLOOD BY AUTOMATED COUNT: 17.2 % (ref 11.6–15.1)
GFR SERPL CREATININE-BSD FRML MDRD: 23 ML/MIN/1.73SQ M
GLUCOSE SERPL-MCNC: 121 MG/DL (ref 65–140)
GLUCOSE SERPL-MCNC: 124 MG/DL (ref 65–140)
GLUCOSE SERPL-MCNC: 124 MG/DL (ref 65–140)
GLUCOSE SERPL-MCNC: 132 MG/DL (ref 65–140)
GLUCOSE SERPL-MCNC: 133 MG/DL (ref 65–140)
GLUCOSE SERPL-MCNC: 139 MG/DL (ref 65–140)
GLUCOSE SERPL-MCNC: 142 MG/DL (ref 65–140)
GLUCOSE SERPL-MCNC: 150 MG/DL (ref 65–140)
GLUCOSE SERPL-MCNC: 151 MG/DL (ref 65–140)
GLUCOSE SERPL-MCNC: 151 MG/DL (ref 65–140)
GLUCOSE SERPL-MCNC: 154 MG/DL (ref 65–140)
GLUCOSE SERPL-MCNC: 181 MG/DL (ref 65–140)
GLUCOSE SERPL-MCNC: 199 MG/DL (ref 65–140)
HCT VFR BLD AUTO: 36.9 % (ref 36.5–49.3)
HGB BLD-MCNC: 11.2 G/DL (ref 12–17)
IMM GRANULOCYTES # BLD AUTO: 0.33 THOUSAND/UL (ref 0–0.2)
IMM GRANULOCYTES NFR BLD AUTO: 2 % (ref 0–2)
LYMPHOCYTES # BLD AUTO: 0.76 THOUSANDS/ÂΜL (ref 0.6–4.47)
LYMPHOCYTES NFR BLD AUTO: 5 % (ref 14–44)
MAGNESIUM SERPL-MCNC: 2.3 MG/DL (ref 1.9–2.7)
MCH RBC QN AUTO: 24.3 PG (ref 26.8–34.3)
MCHC RBC AUTO-ENTMCNC: 30.4 G/DL (ref 31.4–37.4)
MCV RBC AUTO: 80 FL (ref 82–98)
MONOCYTES # BLD AUTO: 1.29 THOUSAND/ÂΜL (ref 0.17–1.22)
MONOCYTES NFR BLD AUTO: 9 % (ref 4–12)
NEUTROPHILS # BLD AUTO: 11.75 THOUSANDS/ÂΜL (ref 1.85–7.62)
NEUTS SEG NFR BLD AUTO: 84 % (ref 43–75)
NRBC BLD AUTO-RTO: 0 /100 WBCS
PLATELET # BLD AUTO: 434 THOUSANDS/UL (ref 149–390)
PMV BLD AUTO: 9.9 FL (ref 8.9–12.7)
POTASSIUM SERPL-SCNC: 4 MMOL/L (ref 3.5–5.3)
PROT SERPL-MCNC: 7.7 G/DL (ref 6.4–8.4)
RBC # BLD AUTO: 4.61 MILLION/UL (ref 3.88–5.62)
SODIUM SERPL-SCNC: 139 MMOL/L (ref 135–147)
WBC # BLD AUTO: 14.22 THOUSAND/UL (ref 4.31–10.16)

## 2024-08-26 PROCEDURE — 85730 THROMBOPLASTIN TIME PARTIAL: CPT | Performed by: STUDENT IN AN ORGANIZED HEALTH CARE EDUCATION/TRAINING PROGRAM

## 2024-08-26 PROCEDURE — 82948 REAGENT STRIP/BLOOD GLUCOSE: CPT

## 2024-08-26 PROCEDURE — 99232 SBSQ HOSP IP/OBS MODERATE 35: CPT | Performed by: INTERNAL MEDICINE

## 2024-08-26 PROCEDURE — 97167 OT EVAL HIGH COMPLEX 60 MIN: CPT

## 2024-08-26 PROCEDURE — 85025 COMPLETE CBC W/AUTO DIFF WBC: CPT | Performed by: PHYSICIAN ASSISTANT

## 2024-08-26 PROCEDURE — 80076 HEPATIC FUNCTION PANEL: CPT | Performed by: PHYSICIAN ASSISTANT

## 2024-08-26 PROCEDURE — 71250 CT THORAX DX C-: CPT

## 2024-08-26 PROCEDURE — 94760 N-INVAS EAR/PLS OXIMETRY 1: CPT

## 2024-08-26 PROCEDURE — 97163 PT EVAL HIGH COMPLEX 45 MIN: CPT

## 2024-08-26 PROCEDURE — 99233 SBSQ HOSP IP/OBS HIGH 50: CPT | Performed by: INTERNAL MEDICINE

## 2024-08-26 PROCEDURE — 74176 CT ABD & PELVIS W/O CONTRAST: CPT

## 2024-08-26 PROCEDURE — 80048 BASIC METABOLIC PNL TOTAL CA: CPT | Performed by: PHYSICIAN ASSISTANT

## 2024-08-26 PROCEDURE — 99233 SBSQ HOSP IP/OBS HIGH 50: CPT | Performed by: ANESTHESIOLOGY

## 2024-08-26 PROCEDURE — 83735 ASSAY OF MAGNESIUM: CPT | Performed by: PHYSICIAN ASSISTANT

## 2024-08-26 RX ORDER — METOPROLOL TARTRATE 1 MG/ML
5 INJECTION, SOLUTION INTRAVENOUS ONCE
Status: COMPLETED | OUTPATIENT
Start: 2024-08-26 | End: 2024-08-26

## 2024-08-26 RX ORDER — METOPROLOL TARTRATE 25 MG/1
25 TABLET, FILM COATED ORAL EVERY 12 HOURS SCHEDULED
Status: DISCONTINUED | OUTPATIENT
Start: 2024-08-26 | End: 2024-09-03 | Stop reason: HOSPADM

## 2024-08-26 RX ORDER — ALBUMIN, HUMAN INJ 5% 5 %
12.5 SOLUTION INTRAVENOUS ONCE
Status: COMPLETED | OUTPATIENT
Start: 2024-08-26 | End: 2024-08-26

## 2024-08-26 RX ORDER — BUMETANIDE 0.25 MG/ML
1 INJECTION INTRAMUSCULAR; INTRAVENOUS 2 TIMES DAILY
Status: DISCONTINUED | OUTPATIENT
Start: 2024-08-26 | End: 2024-08-27

## 2024-08-26 RX ADMIN — SODIUM CHLORIDE 4 UNITS/HR: 9 INJECTION, SOLUTION INTRAVENOUS at 13:35

## 2024-08-26 RX ADMIN — ACETAMINOPHEN 975 MG: 325 TABLET ORAL at 09:02

## 2024-08-26 RX ADMIN — ACETAMINOPHEN 975 MG: 325 TABLET ORAL at 15:52

## 2024-08-26 RX ADMIN — COLCHICINE 0.6 MG: 0.6 TABLET ORAL at 09:02

## 2024-08-26 RX ADMIN — HEPARIN SODIUM 2000 UNITS: 1000 INJECTION INTRAVENOUS; SUBCUTANEOUS at 07:45

## 2024-08-26 RX ADMIN — METOPROLOL TARTRATE 5 MG: 1 INJECTION, SOLUTION INTRAVENOUS at 09:01

## 2024-08-26 RX ADMIN — DOCUSATE SODIUM 100 MG: 100 CAPSULE, LIQUID FILLED ORAL at 09:01

## 2024-08-26 RX ADMIN — POLYETHYLENE GLYCOL 3350 17 G: 17 POWDER, FOR SOLUTION ORAL at 09:01

## 2024-08-26 RX ADMIN — AMIODARONE HYDROCHLORIDE 200 MG: 200 TABLET ORAL at 09:08

## 2024-08-26 RX ADMIN — HEPARIN SODIUM 21 UNITS/KG/HR: 10000 INJECTION, SOLUTION INTRAVENOUS at 11:59

## 2024-08-26 RX ADMIN — CHLORHEXIDINE GLUCONATE 0.12% ORAL RINSE 15 ML: 1.2 LIQUID ORAL at 09:01

## 2024-08-26 RX ADMIN — METOPROLOL TARTRATE 25 MG: 25 TABLET, FILM COATED ORAL at 15:52

## 2024-08-26 RX ADMIN — CEFAZOLIN SODIUM 2000 MG: 2 SOLUTION INTRAVENOUS at 18:40

## 2024-08-26 RX ADMIN — BUMETANIDE 1 MG: 0.25 INJECTION INTRAMUSCULAR; INTRAVENOUS at 05:28

## 2024-08-26 RX ADMIN — DOCUSATE SODIUM 100 MG: 100 CAPSULE, LIQUID FILLED ORAL at 18:39

## 2024-08-26 RX ADMIN — CEFAZOLIN SODIUM 2000 MG: 2 SOLUTION INTRAVENOUS at 12:00

## 2024-08-26 RX ADMIN — SENNOSIDES 8.6 MG: 8.6 TABLET, FILM COATED ORAL at 09:01

## 2024-08-26 RX ADMIN — METHOCARBAMOL 750 MG: 750 TABLET ORAL at 15:52

## 2024-08-26 RX ADMIN — ACETAMINOPHEN 975 MG: 325 TABLET ORAL at 21:25

## 2024-08-26 RX ADMIN — ALBUMIN (HUMAN) 12.5 G: 12.5 INJECTION, SOLUTION INTRAVENOUS at 05:29

## 2024-08-26 RX ADMIN — CEFAZOLIN SODIUM 2000 MG: 2 SOLUTION INTRAVENOUS at 05:28

## 2024-08-26 RX ADMIN — BUMETANIDE 1 MG: 0.25 INJECTION INTRAMUSCULAR; INTRAVENOUS at 18:39

## 2024-08-26 RX ADMIN — METOPROLOL TARTRATE 25 MG: 25 TABLET, FILM COATED ORAL at 21:28

## 2024-08-26 RX ADMIN — METHOCARBAMOL 750 MG: 750 TABLET ORAL at 09:02

## 2024-08-26 RX ADMIN — PREDNISONE 30 MG: 10 TABLET ORAL at 09:02

## 2024-08-26 RX ADMIN — COLCHICINE 0.6 MG: 0.6 TABLET ORAL at 18:39

## 2024-08-26 RX ADMIN — METHOCARBAMOL 750 MG: 750 TABLET ORAL at 21:26

## 2024-08-26 RX ADMIN — AMIODARONE HYDROCHLORIDE 150 MG: 50 INJECTION, SOLUTION INTRAVENOUS at 10:27

## 2024-08-26 RX ADMIN — CEFAZOLIN SODIUM 2000 MG: 2 SOLUTION INTRAVENOUS at 23:02

## 2024-08-26 RX ADMIN — AMIODARONE HYDROCHLORIDE 200 MG: 200 TABLET ORAL at 15:52

## 2024-08-26 NOTE — PROGRESS NOTES
Progress Note - Infectious Disease   Augustus Coffman 68 y.o. male MRN: [id number]  Unit/Bed#: PPHP-310-01 Encounter: [id number]    Below plan and management is preliminary and to be discussed with attending with attestation to follow from Dr. Faulkner.       Impression/Plan:  1. Shock - Patient initially being treated at [facility] with IV antibiotics and course was complicated by hypotension in the setting of atrial fibrillation with RVR 8/21 for which he was started on amiodarone and transfer to [facility] for further management under ICU care.   Management of atrial fibrillation per primary team  Cardiology is following for #4 noted below  Has been off pressors since 8/24  As per previous recommendations, would consider repeating MRI C-spine and T-spine  Will need LUCRECIA to rule out perivalvular complications  Patient remains under CC with plans to transition to SD2 care today   Cardiology, Podiatry, Nephrology, and Neurosurgery are following    2. MSSA bacteremia - Initially positive blood cultures for MSSA that have begun to clear. Initial source thought to be due to chronic right foot ulcer but given #4 there is also concern for purulent pericarditis verus endovascular/valvular complication  Most recent blood cx 8/23 with no growth at 48H  Continue IV Ancef 2g q6H, can potentially lower dose pending MRI results as noted above  LUCRECIA has been ordered  Trend fever curve/WBC  Follow cultures    3. C5-C6 osteomyelitis/discitis with epidural phlegmon/abscess  Neurosurgery has evaluated this admission and is following  Can consider repeat MRI of cervical and thoracic spine as noted above  Follow neuro exam for acute changes  Will likely need prolonged antibiotic duration with outpatient labs/imaging follow-up    4. Pericardial effusion with potential pericarditis - Found after patient developed chest pain, diaphoresis with ST elevations on EKG, negative troponins, and new pericardial effusion on TTE. No tamponade  physiology present. Cardiology has been following and patient remains on colchicine therapy  No plan for pericardial drainage per cardiology notes. Plan to repeat echo in 48H to assess stability or progression  Antibiotics as above  Recommend LUCRECIA as noted above. Team will consider pending clinical course and blood culture results  Management per Cardiology team    5. FINA on CKD - Baseline creatinine 0.9-1.1. Nephrology folloiwng this admission, creatinine peaked at 3.3, now plateauing and downtrending  Nephrology continues to follow patient  Avoid contrast and other nephrotoxic agents  Antibiotic dosing to be adjusted as needed for renal impairment    6. Acute transaminitis - Likely in setting of acute infection, hypotension. RUQ US unremarkable. Labs appear to be downtrending.   Continue to follow CMP  Antibiotics as above    7. Chronic right foot ulcer with hardware present - Podiatry has been consulted without plans for acute intervention other than wound care and surgical shoe as ulcer is currently stable and not believed to be current source of bacteremia.     8. Type 2 Diabetes - Last A1c 6/8. Takes metformin at home. Glucose has been relatively controlled throughout this admission, remains on insulin gtt. Management per primary team.     9. Obesity - BMI 34. Continue current abx regimen for now, will adjust as needed.       ID consult service will continue to follow.    Antibiotics:  IV Ancef D7    24 Hour events:  Yesterday and overnight notes reviewed. No acute events overnight.     Subjective:  Patient has no fever, chills, sweats; no nausea, vomiting, diarrhea; no cough, shortness of breath; no pain. No new symptoms. Denies neck pain currently.     Interval History: 67yo male with history of T2DM c/b neuropathy and chronic R foot diabetic foot wound, HTN, CHF, stasis dermatitis of bilateral lower extremities, obesity, CKD, HLD, lymphoma now in remission. Patient had outpatient MRI for chronic neck pain  that ultimately showed C5-C6 osteo/discitis with 3mm epidural abscess/phlegmon. Admission blood culture was positive and was treated with short course of IV abx. He was eventually discharged and returned to ER after unchanged cervical pathology with noted elevated creatinine and LFTs as well as positive blood cultures for MSSA. Hospital course c/b chest pain and diaphoresis accompanied by ST elevations and echo that showed pericardial effusion concerning for pericarditis. Course further c/b by afib with RVR, hypotension for which he is under CC management on pressors.     Objective:  Vitals:  Temp:  [97.8 °F (36.6 °C)-98.1 °F (36.7 °C)] 97.9 °F (36.6 °C)  HR:  [] 121  Resp:  [15-34] 19  BP: (107-120)/(69-82) 107/69  SpO2:  [95 %-98 %] 97 %  Temp (24hrs), Av.9 °F (36.6 °C), Min:97.8 °F (36.6 °C), Max:98.1 °F (36.7 °C)  Current: Temperature: 97.9 °F (36.6 °C)    Physical Exam:   General Appearance:  Alert, nontoxic, no acute distress.   Throat: Oropharynx moist without lesions.    Lungs:   Clear to auscultation bilaterally; no wheezes, rhonchi or rales; respirations unlabored   Heart:  RRR; no murmur, rub or gallop   Abdomen:   Soft, non-tender, non-distended, positive bowel sounds.     Extremities: No clubbing, cyanosis or edema. Stasis of b/l extremities present on skin. C-collar in place.    Skin: No new rashes or lesions. No draining wounds noted.       Labs, Imaging, & Other studies:   All pertinent labs and imaging studies in PACS were personally reviewed as below.  Results from last 7 days   Lab Units 24  0455 24  0405 24  0403   WBC Thousand/uL 14.22* 17.18* 22.20*   HEMOGLOBIN g/dL 11.2* 10.2* 10.4*   PLATELETS Thousands/uL 434* 366 383     Results from last 7 days   Lab Units 24  0508 24  0406 24  0405 24  0620 24  0403   POTASSIUM mmol/L 4.0   < >  --    < >  --    CHLORIDE mmol/L 91*   < >  --    < >  --    CO2 mmol/L 30   < >  --    < >  --    BUN  mg/dL 70*   < >  --    < >  --    CREATININE mg/dL 2.73*   < >  --    < >  --    EGFR ml/min/1.73sq m 23   < >  --    < >  --    CALCIUM mg/dL 9.7   < >  --    < >  --    AST U/L 513*  --  917*  --  2,452*   ALT U/L 33  --  81*  --  418*   ALK PHOS U/L 302*  --  188*  --  179*    < > = values in this interval not displayed.     Results from last 7 days   Lab Units 08/23/24  2153 08/23/24  2055 08/23/24 2011 08/22/24  0606 08/21/24  0103 08/20/24  1432 08/20/24  1430 08/20/24  1417   BLOOD CULTURE   --  No Growth at 48 hrs. No Growth at 48 hrs. No Growth After 4 Days.  No Growth After 4 Days.  --  Staphylococcus aureus* Staphylococcus aureus* Staphylococcus aureus*   GRAM STAIN RESULT   --   --   --   --   --  Gram positive cocci in clusters* Gram positive cocci in clusters* Gram positive cocci in clusters*   URINE CULTURE   --   --   --   --  10,000-19,000 cfu/ml Enterococcus faecalis*  --   --   --    MRSA CULTURE ONLY  No Methicillin Resistant Staphlyococcus aureus (MRSA) isolated  --   --   --   --   --   --   --        Dior Ramon, DO  Internal Medicine Residency, PGY-2

## 2024-08-26 NOTE — PROGRESS NOTES
Follow up Consultation    Nephrology   Augustus P Augustus 68 y.o. male MRN: [id number]  Unit/Bed#: PPHP-310-01 Encounter: [id number]      Physician Requesting Consult: Sylvain Torres DO        ASSESSMENT:  68-year-old male with multiple comorbidities including diabetes, follicular lymphoma and recent MSSA bacteremia presented with abnormal MRI cervical spine as an outpatient.  Nephrology following for acute kidney injury.      Acute kidney injury :   FINA most likely secondary to ischemic injury secondary to ischemic injury secondary to hemodynamic perturbations plus new onset A-fib plus septic shock with subsequent ATN.  After review of records it appears that the patient has a baseline Creatinine of 0.9-1.1 mg/dL.  Admission creatinine of 1.36 mg/dL on 8/20/2024.  Creatinine today is at 2.59 mg/dL, stable and improving.  Dialysis consent in chart in case needed per my prior colleague  Monitor for dialysis needs  May give as needed Bumex 2 mg p.o. if worsening volume status/respiratory status  check BMP, magnesium, phosphorus in a.m.  Await renal recovery.  Place on a renal diet when allowed diet order.   Strict I/O.  Daily weights  Urinary retention protocol  Avoid nephrotoxins, adjust meds to appropriate GFR.    H&H/anemia:  most recent hemoglobin at 10.9 g/dL  Maintain hemoglobin greater than 8 grams/deciliter    Acid-base electrolytes:  Hyponatremia: Restrict 1.2 L/day.  Most recent sodium stable 136 mill equivalents.  Resolved  Hyperkalemia: Most recent potassium 3.2 mEq supplement and recheck    Blood pressure/A-fib/volume overload:   Optimize hemodynamic status to avoid delay in renal recovery.  Maintain MAP > 65mmHg  Avoid BP fluctuations.  Home medications:  Current medications: Bumex 1 mg IV 2 times daily, metoprolol 25 mg p.o. every 12  Okay to give Bumex 2 mg p.o. x 1 if worsening volume status or decreased urine output  Consider midodrine 5 mg p.o. 3 times daily if SBP less than 110    Other medical  "problems:  MSSA bacteremia:  Management per primary team.  In the setting of right foot ulcer concern for cervical discitis cervical osteomyelitis with paraspinal phlegmon/abscess.  On Ancef for total of 6 weeks  Pericardial effusion: On steroids per ICU.  Pending pericardiocentesis today   A-fib with RVR: Follow-up with cardiology, on amiodarone and heparin    Plan below is what was discussed with the primary team that they agree with:  check BMP, magnesium, phosphorus in a.m.  Supplement potassium  Okay to give Bumex 2 mg p.o. x 1 if worsening volume status or decreased urine output later today  No acute indication for dialysis at this time  Consider midodrine 5 mg p.o. 3 times daily if SBP less than 110    Thanks for the consult  Will continue to follow  Please call with questions/ concerns.      Yokasta Flores MD, FASN, 2024, 10:47 AM                Objective :   Patient seen and examined in his room no overnight events Hemovac stable remains afebrile still with neck pain, urine output close to 4.2 L / 24 hours.      PHYSICAL EXAM  /69   Pulse (!) 110   Temp 97.5 °F (36.4 °C) (Oral)   Resp 22   Ht 6' 3\" (1.905 m)   Wt 113 kg (250 lb)   SpO2 98%   BMI 31.25 kg/m²   Temp (24hrs), Av.5 °F (36.4 °C), Min:97.5 °F (36.4 °C), Max:97.5 °F (36.4 °C)        Intake/Output Summary (Last 24 hours) at 2024 1047  Last data filed at 2024 0857  Gross per 24 hour   Intake 1119.26 ml   Output 3350 ml   Net -2230.74 ml       I/O last 24 hours:  In: 1387.4 [P.O.:600; I.V.:687.4; IV Piggyback:100]  Out: 4550 [Urine:4550]      Current Weight: Weight - Scale: 113 kg (250 lb)  First Weight: Weight - Scale: 121 kg (267 lb 13.7 oz)  Physical Exam  Vitals and nursing note reviewed.   Constitutional:       General: He is not in acute distress.     Appearance: Normal appearance. He is obese. He is not ill-appearing, toxic-appearing or diaphoretic.   HENT:      Head: Normocephalic and atraumatic.      " Mouth/Throat:      Pharynx: No oropharyngeal exudate.   Eyes:      General: No scleral icterus.  Neck:      Comments: Neck collar in place  Cardiovascular:      Rate and Rhythm: Normal rate.   Pulmonary:      Effort: Pulmonary effort is normal. No respiratory distress.      Breath sounds: No wheezing.   Abdominal:      General: There is no distension.      Palpations: Abdomen is soft.      Tenderness: There is no abdominal tenderness.   Musculoskeletal:         General: Swelling present.      Comments: Trace bilateral lower extremity edema   Skin:     Coloration: Skin is not jaundiced.   Neurological:      General: No focal deficit present.      Mental Status: He is alert and oriented to person, place, and time. Mental status is at baseline.   Psychiatric:         Mood and Affect: Mood normal.         Behavior: Behavior normal.             Review of Systems   Constitutional:  Negative for chills and fever.   HENT:  Negative for congestion.    Respiratory:  Negative for cough and shortness of breath.    Cardiovascular:  Negative for leg swelling.   Gastrointestinal:  Negative for diarrhea.   Genitourinary:  Negative for decreased urine volume.   Musculoskeletal:  Positive for neck pain. Negative for back pain.   Neurological:  Negative for headaches.   Psychiatric/Behavioral:  Negative for agitation.    All other systems reviewed and are negative.      Scheduled Meds:  Current Facility-Administered Medications   Medication Dose Route Frequency Provider Last Rate    acetaminophen  975 mg Oral Q6H SRIDHAR Ophelia Leon PA-C      amiodarone 150 mg in dextrose 5 % 100 mL IV bolus  150 mg Intravenous Once TANVIR Go      amiodarone           amiodarone  200 mg Oral BID With Meals Ophelia Leon PA-C      [START ON 8/28/2024] bumetanide  2 mg Oral Daily TANVIR Go      calcium carbonate  1,000 mg Oral Daily PRN Breonna Santana PA-C      cefazolin  2,000 mg Intravenous Q6H Analisa Faulkner MD  2,000 mg (08/27/24 0522)    chlorhexidine  15 mL Mouth/Throat Q12H [facility] Breonna Santana PA-C      heparin (porcine)  3-20 Units/kg/hr (Order-Specific) Intravenous Titrated TANVIR Go 11.1 Units/kg/hr (08/27/24 0957)    heparin (porcine)  2,000 Units Intravenous Q6H PRN TANVIR Go      heparin (porcine)  4,000 Units Intravenous Q6H PRN TANVIR Go      HYDROmorphone  0.5 mg Intravenous Q3H PRN Breonna Santana PA-C      insulin regular (HumuLIN R,NovoLIN R) 1 Units/mL in sodium chloride 0.9 % 100 mL infusion  0.3-21 Units/hr Intravenous Titrated Ophelia Leon PA-C 2 Units/hr (08/27/24 0801)    magnesium sulfate  2 g Intravenous Once TANVIR Go 2 g (08/27/24 0952)    methocarbamol  750 mg Oral Q6H [facility] Breonna Santana PA-C      metoprolol tartrate  25 mg Oral Q12H [facility] TANVIR Go      ondansetron  4 mg Intravenous Q6H PRN Breonna Santana PA-C      oxyCODONE  10 mg Oral Q4H PRN Breonna Santana PA-C      oxyCODONE  5 mg Oral Q4H PRN Breonna Santana PA-C      polyethylene glycol  17 g Oral Daily Amy Franco PA-C      polyethylene glycol  17 g Oral Once Amy Franco PA-C      [START ON 8/28/2024] potassium chloride  40 mEq Oral Daily TANVIR Go      [START ON 8/28/2024] predniSONE  20 mg Oral Daily TANVIR Go      Followed by    [START ON 8/29/2024] predniSONE  10 mg Oral Daily TANVIR Go      senna-docusate sodium  2 tablet Oral BID Amy Franco PA-C         PRN Meds:.  amiodarone    calcium carbonate    heparin (porcine)    heparin (porcine)    HYDROmorphone    ondansetron    oxyCODONE    oxyCODONE    Continuous Infusions:heparin (porcine), 3-20 Units/kg/hr (Order-Specific), Last Rate: 11.1 Units/kg/hr (08/27/24 0957)  insulin regular (HumuLIN R,NovoLIN R) 1 Units/mL in sodium chloride 0.9 % 100 mL infusion, 0.3-21 Units/hr, Last Rate: 2 Units/hr (08/27/24  0801)          Invasive Devices:     Invasive Devices       Central Venous Catheter Line  Duration             CVC Central Lines 08/23/24 Triple 20cm 3 days              Arterial Line  Duration             Arterial Line 08/23/24 Radial 3 days              Drain  Duration             Urethral Catheter 4 days                      LABORATORY:    Results from last 7 days   Lab Units 08/27/24  0500 08/27/24  0444 08/26/24  0508 08/26/24  0455 08/25/24  1629 08/25/24  1009 08/25/24  0406 08/25/24  0405 08/24/24  2203 08/24/24  1551 08/24/24  1003 08/24/24  0620 08/24/24  0403 08/24/24  0201 08/23/24  2155 08/23/24  1939 08/23/24  0906 08/23/24  0632 08/22/24  0550   WBC Thousand/uL 11.51*  --   --  14.22*  --   --   --  17.18*  --   --   --   --  22.20*  --   --  28.03*  --  25.32* 14.77*   HEMOGLOBIN g/dL 10.9*  --   --  11.2*  --   --   --  10.2*  --   --   --   --  10.4*  --   --  10.5*  --  11.2* 11.2*   HEMATOCRIT % 34.6*  --   --  36.9  --   --   --  33.4*  --   --   --   --  34.2*  --   --  34.6*  --  36.3* 36.4*   PLATELETS Thousands/uL 432*  --   --  434*  --   --   --  366  --   --   --   --  383  --   --  430*  --  356 303   POTASSIUM mmol/L  --  3.2* 4.0  --  4.3 4.5 4.8  --  5.0 5.3 5.5*   < >  --  6.0* 6.0* 5.7*   < >  --  5.0   CHLORIDE mmol/L  --  89* 91*  --  91* 93* 94*  --  93* 94* 96   < >  --  93* 93* 92*   < >  --  97   CO2 mmol/L  --  33* 30  --  28 27 27  --  25 24 22   < >  --  23 22 25   < >  --  26   BUN mg/dL  --  90* 70*  --  66* 63* 59*  --  57* 55* 54*   < >  --  52* 49* 46*   < >  --  23   CREATININE mg/dL  --  2.59* 2.73*  --  3.05* 3.13* 3.23*  --  3.38* 3.33* 3.06*   < >  --  3.04* 3.02* 2.92*   < >  --  1.08   CALCIUM mg/dL  --  9.3 9.7  --  9.4 9.3 9.3  --  9.2 9.4 8.8   < >  --  9.4 9.4 10.0   < >  --  10.0   MAGNESIUM mg/dL  --  2.1 2.3  --   --   --   --  2.2  --  2.5 2.3  --   --  2.4 2.5 2.4   < >  --  2.2   PHOSPHORUS mg/dL  --  4.0  --   --   --   --   --  7.1*  --  7.7* 7.4*   --   --  8.1* 7.7* 7.2*   < >  --   --     < > = values in this interval not displayed.      rest all reviewed    RADIOLOGY:  MRI lumbar spine wo contrast   Final Result by Duc Justice MD (08/27 0748)      No discitis osteomyelitis involving the thoracic spine. Minimal thoracic spondylosis, as described above.      No discitis osteomyelitis involving the lumbar spine.      Multilevel lumbar spondylosis, as described above, contributing to at most moderate canal stenosis, right greater than left lateral recess stenosis, moderate to severe right and mild left neural foraminal stenosis at L4-L5.      Discitis osteomyelitis again demonstrated at C5-C6, on the localizer/sagittal vertebral counting images.      Right pleural effusion.      Workstation performed: [id number]         MRI thoracic spine wo contrast   Final Result by Duc Justice MD (08/27 0748)      No discitis osteomyelitis involving the thoracic spine. Minimal thoracic spondylosis, as described above.      No discitis osteomyelitis involving the lumbar spine.      Multilevel lumbar spondylosis, as described above, contributing to at most moderate canal stenosis, right greater than left lateral recess stenosis, moderate to severe right and mild left neural foraminal stenosis at L4-L5.      Discitis osteomyelitis again demonstrated at C5-C6, on the localizer/sagittal vertebral counting images.      Right pleural effusion.      Workstation performed: [id number]         CT chest abdomen pelvis wo contrast   Final Result by Oneil Desai MD (08/26 1615)   1.  New moderate nonspecific pericardial effusion and small bilateral pleural effusions. No other definitive manifestations of acute infection-allowing for the limitations of noncontrast CT   2.  Complex exophytic right renal cystic lesion, incompletely assessed by noncontrast CT. Nonemergent follow-up gadolinium enhanced MRI of the abdomen is recommended for further evaluation.      3.  Please refer to  "the report body for description of other incidental chronic and/or benign findings.               Workstation performed: [id number]         XR spine cervical 2 or 3 vw injury   Final Result by Bill Ferreira MD (08/26 0722)      Destruction of the C5 to space with collapse of the superior endplate of C6 consistent with discitis osteomyelitis which has progressed from the prior studies.      Increased soft tissue swelling anterior to C5-6 level.      Left central venous catheter terminating at the confluence of the innominate veins.         Workstation performed: [id number]         XR spine cervical 2 or 3 vw injury    (Results Pending)     Rest all reviewed    Portions of the record may have been created with voice recognition software. Occasional wrong word or \"sound a like\" substitutions may have occurred due to the inherent limitations of voice recognition software. Read the chart carefully and recognize, using context, where substitutions have occurred.If you have any questions, please contact the dictating provider.    "

## 2024-08-26 NOTE — ASSESSMENT & PLAN NOTE
Lab Results   Component Value Date    HGBA1C 6.8 (H) 07/05/2024       Recent Labs     08/25/24  1630 08/25/24  1811 08/1956 08/25/24 2202   POCGLU 147* 129 141* 119         Blood Sugar Average: Last 72 hrs:  (P) 166.88  Home regimen: metformin  Insulin infusion for better glucose control   Goal blood glucose 140-180  Transition back to SSI once tolerating PO diet

## 2024-08-26 NOTE — ARC ADMISSION
Spoke at length w/ the patient's daughter Lauren w/ his wife present via phone while in the patient's room. Reviewed [facility] referral process. E-mail has been sent to the patient's daughter with all contact information,  Acute Rehab information and locations, virtual tour availability as well as tour availability in person if desired. Daughter and spouse have indicated interest in [facility] first choice, [facility] second choice.     Thank you,   Breonna Rosas  [facility] Admissions

## 2024-08-26 NOTE — ASSESSMENT & PLAN NOTE
Developed new onset afib 8/21/8/22  Initiated on heparin infusion and metoprolol  Unable to tolerate metoprolol 2/2 hypotension  Received amiodarone bolus and initiated on infusion    Plan:  Amiodarone 200 mg BID  Start metoprolol 25 mg BID  Continue heparin drip

## 2024-08-26 NOTE — ASSESSMENT & PLAN NOTE
Hx HTN on norvasc, HCTZ, losartan, Toprol-XL  All currently on hold given hypotension  Start metoprolol 25 mg PO BID  Statin on hold 2/2 transaminitis  Aspirin on hold - discuss restarting

## 2024-08-26 NOTE — PHYSICAL THERAPY NOTE
Physical Therapy Evaluation     Patient's Name: Augustus Coffman    Admitting Diagnosis  MSSA bacteremia [R78.81, B95.61]    Problem List  Patient Active Problem List   Diagnosis    Diabetes 1.5, managed as type 2 (HCC)    History of hypertension    Hypercholesteremia    Hammer toe of right foot    Stasis dermatitis of both legs    Diabetic peripheral neuropathy (HCC)    Gout    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HCC)    Obesity with body mass index 30 or greater    Type 2 diabetes mellitus (HCC)    Retroperitoneal hematoma    Mesenteric lymphadenopathy    Acute blood loss anemia    FINA (acute kidney injury) on CKD stage 2(HCC)    Heart murmur    GI bleed    Pleural effusion    Chronic venous hypertension (idiopathic) with ulcer of right lower extremity (HCC)    Rash    Stage 2 chronic kidney disease    Hypertensive kidney disease with stage 2 chronic kidney disease    Other proteinuria    Obesity, morbid (HCC)    Follicular lymphoma grade I of lymph nodes of multiple sites (HCC)    Vitamin D deficiency    Stage 3a chronic kidney disease (HCC)    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 (HCC)    Ectatic thoracic aorta (HCC)    Aortic stenosis with trileaflet valve    Sepsis without acute organ dysfunction (HCC)    Acute respiratory failure with hypoxia (HCC)    Acute hyponatremia    Chronic diastolic CHF (congestive heart failure) (HCC)    Encounter for deep vein thrombosis (DVT) prophylaxis    Hyponatremia    Neck pain    Acute osteomyelitis of cervical spine (HCC)    Transaminitis    Acute renal failure with oliguria  (HCC)    MSSA bacteremia    New onset a-fib (HCC)    Hyperkalemia    Acute pericarditis    Diabetic ulcer of right foot (HCC)    Acute respiratory insufficiency    Acute neck pain       Past Medical History  Past Medical History:   Diagnosis Date    Arthritis 2010's    Occasionally flares up    Cancer (HCC) May 2021    Still investigating    Chronic kidney disease     Diabetes mellitus (HCC)     Follicular  lymphoma (HCC)     GERD (gastroesophageal reflux disease) June 2021    Noticed in an Endoscopy    Heart murmur May 2020    High cholesterol     Hypertension     Kidney stone 1980's    Have had since 1980's    Obesity Since Childhood       Past Surgical History  Past Surgical History:   Procedure Laterality Date    BUNIONECTOMY Right 11/24/2020    Procedure: RIGHT HAV CORRECTION,;  Surgeon: Niranjan Child DPM;  [facility];  Service: Podiatry    COLONOSCOPY      INCISION AND DRAINAGE OF WOUND Right 10/05/2016    Procedure: INCISION AND DRAINAGE (I&D) EXTREMITY;  Surgeon: Niranjan Child DPM;  Location: [facility];  Service:     IR BIOPSY LYMPH NODE  07/08/2021    IR EMBOLIZATION (SPECIFY VESSEL OR SITE)  07/08/2021    IR PORT PLACEMENT  9/1/2022    PILONIDAL CYST EXCISION      MN CORRECTION HAMMERTOE Right 02/19/2019    Procedure: THIRD HAMMER TOE CORRECTION;  Surgeon: Niranjan Child DPM;  [facility];  Service: Podiatry    MN RMVL MATEO CTR VAD W/SUBQ PORT/ CTR/PRPH INSJ N/A 3/14/2023    Procedure: REMOVAL VENOUS PORT (PORT-A-CATH)IR;  Surgeon: Avelino Vázquez DO;  [facility];  Service: Interventional Radiology    TOE OSTEOTOMY Right 03/14/2017    Procedure: HAMMERTOE CORRECTION R 2 ;  Surgeon: Niranjan Child DPM;  [facility];  Service:     TOE OSTEOTOMY Left 11/24/2020    Procedure: LEFT HT CORRECTION TOE;  Surgeon: Niranjan Child DPM;  [facility];  Service: Podiatry    TONSILLECTOMY  1962          08/26/24 0903   PT Last Visit   PT Visit Date 08/26/24   Note Type   Note type Evaluation   Pain Assessment   Pain Assessment Tool FLACC   Pain Location/Orientation Location: Generalized   Pain Onset/Description Onset: Ongoing;Frequency: Intermittent;Descriptor: Aching;Descriptor: Discomfort   Effect of Pain on Daily Activities guarding   Patient's Stated Pain Goal No pain   Hospital Pain Intervention(s) Emotional support;Repositioned   Pain Rating: FLACC (Rest) - Face 0   Pain  Rating: FLACC (Rest) - Legs 0   Pain Rating: FLACC (Rest) - Activity 0   Pain Rating: FLACC (Rest) - Cry 0   Pain Rating: FLACC (Rest) - Consolability 0   Score: FLACC (Rest) 0   Pain Rating: FLACC (Activity) - Face 1   Pain Rating: FLACC (Activity) - Legs 0   Pain Rating: FLACC (Activity) - Activity 0   Pain Rating: FLACC (Activity) - Cry 1   Pain Rating: FLACC (Activity) - Consolability 1   Score: FLACC (Activity) 3   Restrictions/Precautions   RLE Weight Bearing Per Order WBAT  (in shoes)   Braces or Orthoses C/S Collar;Other (Comment)  ((R) sx shoe; applied pt's own shoe (L) foot)   Other Precautions Cardiac/sternal;Multiple lines;Telemetry;Spinal precautions;O2;Fall Risk;Pain  (a-line)   Home Living   Type of Home House   Home Layout Two level  (10 LARY and 10 steps inside)   Home Equipment Walker   Prior Function   Level of [place] Independent with functional mobility  (amb w/o AD)   Lives With Spouse   Receives Help From Family   General   Additional Pertinent History cleared for assessment by macarena   Cognition   Overall Cognitive Status WFL   Arousal/Participation Alert   Attention Within functional limits   Orientation Level Oriented to person;Oriented to place;Oriented to situation   Memory Within functional limits   Following Commands Follows one step commands without difficulty   Subjective   Subjective Alert; in the chair; agreeable to try mobilization   RUE Assessment   RUE Assessment WFL  (AROM)   LUE Assessment   LUE Assessment WFL  (AROM)   RLE Assessment   RLE Assessment WFL  (AROM)   Strength RLE   RLE Overall Strength   (fair +)   LLE Assessment   LLE Assessment WFL  (AROM)   Strength LLE   LLE Overall Strength   (fair +)   Transfers   Sit to Stand 3  Moderate assistance   Additional items Assist x 2;Increased time required;Verbal cues   Stand to Sit 3  Moderate assistance   Additional items Assist x 2;Increased time required;Verbal cues   Additional Comments While standing, pt reports SOB,  appearing to be apprehensive, and noted HR to 140s bpm (130s bpm at rest); amb was not attempted; RN aware   Ambulation/Elevation   Gait pattern Not appropriate;Not tested   Balance   Static Sitting Fair -   Dynamic Sitting Poor +   Static Standing Poor   Dynamic Standing Poor   Activity Tolerance   Activity Tolerance Patient limited by fatigue;Treatment limited secondary to medical complications (Comment)   Medical Staff Made Aware Co-eval performed w/ OTR due to complexity of medical status and multiple comorbidities   Nurse Made Aware spoke to ANDERSON Crawford   Assessment   Prognosis Good   Problem List Decreased strength;Decreased endurance;Impaired balance;Decreased mobility;Obesity;Orthopedic restrictions;Pain   Assessment Pt is 68 y.o. male admitted with hx of neck pain and Dx of MSSA bacteremia,  C5-C6 osteomyelitis/discitis with epidural phlegmon/abscess, Pericardial effusion with potential pericarditis, FINA, Acute transaminitis, Acute respiratory insufficiency, and new onset of a-fib. Pt 's comorbidities affecting POC include: Arthritis, Chronic kidney disease, Diabetes mellitus (HCC), Hypertension, Obesity and Diabetic ulcer of right foot  and personal factors of: LARY and steps in the house. Pt's clinical presentation is currently  unstable/unpredictable which is evident in ongoing telem monitoring incl a-line in place, suppl O2 needs, abn lab values and inability to progress further w/ mobilization due to elevated HR and SOB. Pt presents w/ pain and guarding, overall weakness, incl decreased LE strength, decreased functional endurance and activity tolerance, impaired balance, transfers difficulty and inability to amb at this time. Will cont to follow pt in PT for progressive mobilization to address above functional deficits and to max level of (I), endurance, and safety. D/C recommendations are outlined below. Will cont to follow until then.   Barriers to Discharge Inaccessible home environment   Goals    Patient Goals to get better   STG Expiration Date 09/05/24   Short Term Goal #1 7-10 days. Pt will amb 2x 50 ft w/ rw, (S)x1 in order to facilitate safe return to premorbid environment and to at least household amb status. Pt will negotiate 10 steps w/ hand rail and SPC PRN, (S)x1 in order to assure safe navigation in and out of the premorbid living environment. Pt will achieve mod (I) level w/ bed mob in order to facilitate safety with OOB and back to bed transitions in own living environment. Pt will perform transfers w/ mod (I) to assure (I) and safety w/ functional mobility/transitions w/ all aspects of mobility/locomotion. Pt will participate in LE therex and balance activities to max progression w/ mobility skills.   PT Treatment Day 0   Plan   Treatment/Interventions Functional transfer training;LE strengthening/ROM;Elevations;Therapeutic exercise;Endurance training;Equipment eval/education;Bed mobility;Gait training;Spoke to nursing;Spoke to case management;OT   PT Frequency 3-5x/wk   Discharge Recommendation   Rehab Resource Intensity Level, PT I (Maximum Resource Intensity)   AM-PAC Basic Mobility Inpatient   Turning in Flat Bed Without Bedrails 2   Lying on Back to Sitting on Edge of Flat Bed Without Bedrails 2   Moving Bed to Chair 2   Standing Up From Chair Using Arms 2   Walk in Room 1   Climb 3-5 Stairs With Railing 1   Basic Mobility Inpatient Raw Score 10   Turning Head Towards Sound 3   Follow Simple Instructions 3   Low Function Basic Mobility Raw Score  16   Low Function Basic Mobility Standardized Score  25.72   [facility] Highest Level Of Mobility   -[facility] Goal 4: Move to chair/commode   -HL Achieved 5: Stand (1 or more minutes)   Modified Brinklow Scale   Modified Martin Scale 4   End of Consult   Patient Position at End of Consult Bedside chair;All needs within reach         Angel Sandoval, PT

## 2024-08-26 NOTE — ASSESSMENT & PLAN NOTE
Lab Results   Component Value Date    HGBA1C 6.8 (H) 07/05/2024       Recent Labs     08/25/24  1630 08/25/24  1811 08/1956 08/25/24 2202   POCGLU 147* 129 141* 119         Blood Sugar Average: Last 72 hrs:  (P) 166.88

## 2024-08-26 NOTE — OCCUPATIONAL THERAPY NOTE
Occupational Therapy Evaluation     Patient Name: Augustus Coffman  Today's Date: 8/26/2024  Problem List  Principal Problem:    MSSA bacteremia  Active Problems:    History of hypertension    Diabetic peripheral neuropathy (HCC)    Type 2 diabetes mellitus (HCC)    FINA (acute kidney injury) on CKD stage 2(HCC)    Acute osteomyelitis of cervical spine (HCC)    Transaminitis    New onset a-fib (HCC)    Hyperkalemia    Acute pericarditis    Diabetic ulcer of right foot (HCC)    Acute respiratory insufficiency    Acute neck pain    Past Medical History  Past Medical History:   Diagnosis Date    Arthritis 2010's    Occasionally flares up    Cancer (HCC) May 2021    Still investigating    Chronic kidney disease     Diabetes mellitus (HCC)     Follicular lymphoma (HCC)     GERD (gastroesophageal reflux disease) June 2021    Noticed in an Endoscopy    Heart murmur May 2020    High cholesterol     Hypertension     Kidney stone 1980's    Have had since 1980's    Obesity Since Childhood     Past Surgical History  Past Surgical History:   Procedure Laterality Date    BUNIONECTOMY Right 11/24/2020    Procedure: RIGHT HAV CORRECTION,;  Surgeon: Niranjan Child DPM;  [facility];  Service: Podiatry    COLONOSCOPY      INCISION AND DRAINAGE OF WOUND Right 10/05/2016    Procedure: INCISION AND DRAINAGE (I&D) EXTREMITY;  Surgeon: Niranjan Child DPM;  [facility];  Service:     IR BIOPSY LYMPH NODE  07/08/2021    IR EMBOLIZATION (SPECIFY VESSEL OR SITE)  07/08/2021    IR PORT PLACEMENT  9/1/2022    PILONIDAL CYST EXCISION      MT CORRECTION HAMMERTOE Right 02/19/2019    Procedure: THIRD HAMMER TOE CORRECTION;  Surgeon: Niranjan Child DPM;  [facility];  Service: Podiatry    MT RMVL MATEO CTR VAD W/SUBQ PORT/ CTR/PRPH INSJ N/A 3/14/2023    Procedure: REMOVAL VENOUS PORT (PORT-A-CATH)IR;  Surgeon: Avelino Vázquez DO;  [facility];  Service: Interventional Radiology    TOE OSTEOTOMY Right  03/14/2017    Procedure: HAMMERTOE CORRECTION R 2 ;  Surgeon: Niranjan Child DPM;  [facility];  Service:     TOE OSTEOTOMY Left 11/24/2020    Procedure: LEFT HT CORRECTION TOE;  Surgeon: Niranjan Child DPM;  [facility];  Service: Podiatry    TONSILLECTOMY  1962             08/26/24 0904   OT Last Visit   OT Visit Date 08/26/24   Note Type   Note type Evaluation   Pain Assessment   Pain Assessment Tool 0-10   Pain Score 2   Pain Location/Orientation Location: Neck   Patient's Stated Pain Goal No pain   Hospital Pain Intervention(s) Repositioned;Ambulation/increased activity   Restrictions/Precautions   Weight Bearing Precautions Per Order Yes   RLE Weight Bearing Per Order (S)  WBAT  (in surgical shoe)   Braces or Orthoses (S)  Other (Comment);C/S Collar  (C-collar donned throughout session, surgical shoe donned on RLE, sneaker donned on LLE prior to transfer)   Other Precautions Cognitive;Multiple lines;Telemetry;Fall Risk;Pain;Spinal precautions;O2  (MFNC)   Home Living   Type of Home House   Home Layout Two level;Stairs to enter with rails;Bed/bath upstairs  (split-level with 10 LARY, 10 steps to 2nd fl)   Bathroom Shower/Tub Walk-in shower   Bathroom Toilet Standard   Bathroom Equipment Built-in shower seat   Bathroom Accessibility Accessible   Home Equipment Walker  (not using pta)   Additional Comments Pt lives in a split-level home with 10 LARY, 10 steps to 2nd fl bed/bath with walk-in shower with seat and GB   Prior Function   Level of [place] Independent with ADLs;Independent with functional mobility;Independent with IADLS   Lives With Spouse   Receives Help From Family   IADLs Independent with medication management;Independent with driving;Family/Friend/Other provides meals  (spouse cooks)   Falls in the last 6 months 0   Vocational Full time employment   Lifestyle   Autonomy Pt reports pta he was I with ADL, IADL and functional mobility, (+)    Reciprocal Relationships supportive  "spouse   Service to Others 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 [employer] restaurant   Intrinsic Gratification enjoys working   Subjective   Subjective \"It took me two guys to get me from the bed to this chair\"   ADL   Where Assessed Chair   Eating Assistance 5  Supervision/Setup   Grooming Assistance 4  Minimal Assistance   UB Bathing Assistance 3  Moderate Assistance   LB Bathing Assistance 2  Maximal Assistance   UB Dressing Assistance 4  Minimal Assistance   LB Dressing Assistance 2  Maximal Assistance   Toileting Assistance  2  Maximal Assistance   Functional Assistance 3  Moderate Assistance   Bed Mobility   Additional Comments Pt greeted OOB in chair, left in chair with all needs within reach   Transfers   Sit to Stand 3  Moderate assistance   Additional items Assist x 2;Increased time required;Verbal cues   Stand to Sit 3  Moderate assistance   Additional items Assist x 2;Increased time required;Verbal cues   Additional Comments with RW, HR elevated to 140 and pt becoming SOB with anxiety, returned to recover with seated rest, spO2 stable throughout on 8 L O2   Functional Mobility   Additional Comments unable to assess at this time due to pt elevated HR, anxiety, reports of SOB.   Balance   Static Sitting Fair   Dynamic Sitting Fair -   Static Standing Poor   Dynamic Standing Poor   Activity Tolerance   Activity Tolerance Patient limited by fatigue;Patient limited by pain;Treatment limited secondary to medical complications (Comment)  (anxiety, elevated HR, SOB although spO2 stable, RN aware and present)   Medical Staff Made Aware Co-eval with DPT due to high medical complexity, assist from restorative Breonna   Nurse Made Aware RN cleared for therapy, present and aware of performance   RUE Assessment   RUE Assessment X  (Shoulder AROM limited to about 100* on this date)   LUE Assessment   LUE Assessment X  (Shoulder AROM limited to about 100* on this date)   Hand Function   Gross Motor Coordination Functional   Fine Motor Coordination " Functional   Sensation   Light Touch No apparent deficits   Vision-Basic Assessment   Current Vision Wears glasses only for reading   Psychosocial   Psychosocial (WDL) X   Patient Behaviors/Mood Anxious   Cognition   Overall Cognitive Status WFL   Arousal/Participation Cooperative;Alert   Attention Attends with cues to redirect   Orientation Level Oriented X4   Memory Decreased recall of precautions;Decreased recall of recent events   Following Commands Follows one step commands with increased time or repetition   Comments Pt cooperative to therapy although very anxious throughout session often requiring redirect, with decreased safety awareness requiring vc for safety during transfers.   Assessment   Limitation Decreased ADL status;Decreased Safe judgement during ADL;Decreased endurance;Decreased self-care trans;Decreased high-level ADLs   Prognosis Fair   Assessment Pt is a 68 y.o. male who was [facility] to [facility] on 8/23/[facility]24 with MSSA bacteremia, possibly due to R foot ulcer. Pt seen for an OT evaluation per active OT orders, C-collar donned at all times, WBAT RLE in surgical shoe maintained throughout transfers.  Pt  has a past medical history of Arthritis, Cancer (HCC), Chronic kidney disease, Diabetes mellitus (HCC), Follicular lymphoma (HCC), GERD (gastroesophageal reflux disease), Heart murmur, High cholesterol, Hypertension, Kidney stone, and Obesity. Pt lives in a split-level home with 10 LARY, has 10 steps to 2nd fl bed/bath with walk-in shower and standard toilet. Pta, pt was independent w/ ADL/IADL and functional mobility, was (+) driving and was not using any DME at baseline. Currently, pt is Min-Mod Ax1 for UB ADL, Max Ax1 for LB ADL, and completed transfers/FM w Mod Ax2 with RW. Pt currently presents with impairments in the following categories -steps to enter environment, difficulty performing ADLS, and limited insight into deficits activity tolerance, endurance, standing  balance/tolerance, safety , judgement , and attention . These impairments, as well as pt's fatigue, SOB, JUAREZ, pain, spinal precautions, and risk for falls  limit pt's ability to safely engage in all baseline areas of occupation, includinggrooming, bathing, dressing, toileting, functional mobility/transfers, and community mobility.  The patient's raw score on the AM-PAC Daily Activity Inpatient Short Form is 15. A raw score of less than 19 suggests the patient may benefit from discharge to post-acute rehabilitation services. Please refer to the recommendation of the Occupational Therapist for safe discharge planning. Pt would benefit from continued acute OT services throughout hospital course and following D/C. Plan for OT interventions 2-3x per week. From OT standpoint, recommend level I services pending progress upon D/C. Pt was left seated in bedside chair with all needs within reach.   Goals   Patient Goals to feel better   Plan   Treatment Interventions ADL retraining;Functional transfer training;Endurance training;Patient/family training;Equipment evaluation/education;Continued evaluation;Energy conservation;Compensatory technique education   Goal Expiration Date 09/09/24   OT Frequency 2-3x/wk   Discharge Recommendation   Rehab Resource Intensity Level, OT I (Maximum Resource Intensity)  (pending progress)   AM-PAC Daily Activity Inpatient   Lower Body Dressing 2   Bathing 2   Toileting 2   Upper Body Dressing 3   Grooming 3   Eating 3   Daily Activity Raw Score 15   Daily Activity Standardized Score (Calc for Raw Score >=11) 34.69   -PAC Applied Cognition Inpatient   Following a Speech/Presentation 4   Understanding Ordinary Conversation 4   Taking Medications 4   Remembering Where Things Are Placed or Put Away 3   Remembering List of 4-5 Errands 3   Taking Care of Complicated Tasks 3   Applied Cognition Raw Score 21   Applied Cognition Standardized Score 44.3   Modified Mower Scale   Modified Mower Scale  4   End of Consult   Education Provided Yes   Patient Position at End of Consult Bedside chair;All needs within reach   Nurse Communication Nurse aware of consult        OT Goals    - Pt will be Min A  with LB ADL by end of hospital course.    - Pt will be Supervision with UB ADL by end of hospital course.    - Pt will be Min A  with all functional transfers required for patient safety by end of hospital course.    - Pt will be Min A  with functional mobility to/from bathroom for increased independence with toileting tasks.    - Pt will independently recall and implement safety precautions during OT sessions.     - Pt activity tolerance will increase to 30 minutes in order to safely engage in ADL and transfers.     - Pt standing tolerance will increase to 5 minutes  in order to promote sink side ADLs and IADL activities.    - Pt will consistently follow one-step directions with min to no vc or prompting.             CANDY Black, OTR/L

## 2024-08-26 NOTE — ASSESSMENT & PLAN NOTE
Patient with prior admission with MSSA bacteremia on 7/28 treated with 7 days of antibiotics  Etiology likely 2/2 right foot ulcer  Complicated by cervical discitis  Echo without evidence of endocarditis    Plan:  ID consulted, appreciate their recommendations  Plan for ancef x 6 weeks  Repeat blood cultures sent given episodes of diaphoresis/chills  8/23 Blood cultures: no growth to date  MRSA swab negative  UA with micro unremarkable for infection   Consider LUCRECIA given poor visualization of valves

## 2024-08-26 NOTE — ASSESSMENT & PLAN NOTE
Tylenol 975 mg PO q6h  Oxycodone 5 mg/10 mg q4h PRN   Robaxin 750 mg every 6 hrs  Dilaudid 0.5 mg IV q3h PRN for breakthrough pain

## 2024-08-26 NOTE — PROGRESS NOTES
[facility]  Progress Note  Name: Augustus Coffman I  MRN: [id number]  Unit/Bed#: PPHP-310-01 I Date of Admission: 8/23/2024   Date of Service: 8/26/2024 I Hospital Day: 3    Assessment & Plan   * MSSA bacteremia  Assessment & Plan  Patient with prior admission with MSSA bacteremia on 7/28 treated with 7 days of antibiotics  Etiology likely 2/2 right foot ulcer  Complicated by cervical discitis  Echo without evidence of endocarditis    Plan:  ID consulted, appreciate their recommendations  Plan for ancef x 6 weeks  Repeat blood cultures sent given episodes of diaphoresis/chills  8/23 Blood cultures: no growth to date  MRSA swab negative  UA with micro unremarkable for infection   Consider LUCRECIA given poor visualization of valves    Hypotension-resolved as of 8/25/2024  Assessment & Plan  Developed new onset hypotension in the setting of afib with RVR  Weaned off levophed/vasopressin 8/24    New onset a-fib (HCC)  Assessment & Plan  Developed new onset afib 8/21/8/22  Initiated on heparin infusion and metoprolol  Unable to tolerate metoprolol 2/2 hypotension  Received amiodarone bolus and initiated on infusion    Plan:  Amiodarone 200 mg BID  Start metoprolol 25 mg BID  Continue heparin drip    Acute pericarditis  Assessment & Plan  8/22 Echo: Limited echo for assessment of endocarditis.  The patient's aortic valve is difficult to assess due to calcification but there is no new significant regurgitation.  The mitral valve has hyperechoic thickening at the tips consistent with most likely calcification, these were seen on the echocardiograms in July.  The tricuspid valve similarly was not well-visualized but no new regurgitation.  The pulmonary valve was not well-visualized. Off note patient has new moderate pericardial effusion without specific echocardiographic signs of tamponade  Patient with symptoms consistent with pericarditis  Plan to repeat echo in the next 24-48  hours    Plan:  Cardiology consulted  Colchicine 0.6 mg BID  Unable to initiate NSAIDs 2/2 FINA  Prednisone 30 mg daily  Close hemodynamic monitoring    Acute respiratory insufficiency  Assessment & Plan  Currently requiring MFNC  Appears related to low lung volumes 2/2 pain  Wean O2 as tolerated to maintain Spo2 > 92%  IS, OOB    FINA (acute kidney injury) on CKD stage 2([facility])  Assessment & Plan  Baseline Cr 1-1.2  Developed FINA 8/23 with associated hyperkalemia    Plan:  Nephrology following, appreciate their recommendations  Maintain avilez  Avoid nephrotoxins  Strict I/Os  Trend renal function  Continue diuresis - can likely decrease to daily dosing    Acute neck pain  Assessment & Plan  Tylenol 975 mg PO q6h  Oxycodone 5 mg/10 mg q4h PRN   Robaxin 750 mg every 6 hrs  Dilaudid 0.5 mg IV q3h PRN for breakthrough pain    Acute osteomyelitis of cervical spine ([facility])  Assessment & Plan  8/20 MRI C-Spine: Imaging findings suspicious for discitis osteomyelitis at C5-C6. 3 mm epidural phlegmon/small abscess is also suspected. There is moderate resultant central stenosis and mild mass effect on the cord  Neurosurgery consulted   Upright xrays completed  Recommending wearing of C-collar which is currently in place  Recommending 6 weeks IV antibiotics and follow-up repeat imaging  Q4h neuro checks    Hyperkalemia  Assessment & Plan  Developed FINA with hyperkalemia  Received medical therapy with insulin, calcium, bicarb, lasix, lokelma  Trending down currently with diuresis    Diabetic ulcer of right foot ([facility])  Assessment & Plan  Follows as an outpatient with wound care  Likely source of MSSA bacteremia   Continue local wound care  Podiatry consulted  WBAT    Type 2 diabetes mellitus ([facility])  Assessment & Plan  Lab Results   Component Value Date    HGBA1C 6.8 (H) 07/05/2024       Recent Labs     08/25/24  1630 08/25/24  1811 08/25/24  1956/24  2202   POCGLU 147* 129 141* 119         Blood Sugar Average: Last 72 hrs:  (P)  166.88  Home regimen: metformin  Insulin infusion for better glucose control   Goal blood glucose 140-180  Transition back to SSI once tolerating PO diet    History of hypertension  Assessment & Plan  Hx HTN on norvasc, HCTZ, losartan, Toprol-XL  All currently on hold given hypotension  Start metoprolol 25 mg PO BID  Statin on hold 2/2 transaminitis  Aspirin on hold - discuss restarting    Diabetic peripheral neuropathy (HCC)  Assessment & Plan  Lab Results   Component Value Date    HGBA1C 6.8 (H) 07/05/2024       Recent Labs     08/25/24  1630 08/25/24  1811 08/25/24  1956/24  2202   POCGLU 147* 129 141* 119         Blood Sugar Average: Last 72 hrs:  (P) 166.88      Transaminitis  Assessment & Plan  Likely 2/2 poor perfusion/hypotension  Hold statin  Trend             Disposition: Stepdown Level 2    ICU Core Measures     A: Assess, Prevent, and Manage Pain Has pain been assessed? Yes  Need for changes to pain regimen? No   B: Both SAT/SAT  N/A   C: Choice of Sedation RASS Goal: N/A patient not on sedation  Need for changes to sedation or analgesia regimen? No   D: Delirium CAM-ICU: Negative   E: Early Mobility  Plan for early mobility? Yes   F: Family Engagement Plan for family engagement today? Yes       Antibiotic Review: Infectious disease consulted    Review of Invasive Devices:    Villa Plan: Voiding trial after improvement in ambulation   Central access plan: Will obtain peripheral access and discontinue prior to transfer  Sonia Plan: Discontinue arterial line    Prophylaxis:  VTE VTE covered by:  heparin (porcine), Intravenous, 19 Units/kg/hr at 08/25/24 2050  heparin (porcine), Intravenous  heparin (porcine), Intravenous       Stress Ulcer  not ordered         Significant 24hr Events     24hr events: Continued improvement in renal function/potassium/UOP. Transitioned off bumex infusion. Back in afib - received amiodarone bolus and metoprolol 5 mg IV without improvement.      Subjective   Review of  Systems: Review of Systems   Respiratory:  Negative for shortness of breath.    Cardiovascular:  Positive for chest pain (mild).   Gastrointestinal:  Positive for constipation. Negative for abdominal pain.   Musculoskeletal:  Positive for neck pain (improving).   All other systems reviewed and are negative.       Objective                            Vitals I/O      Most Recent Min/Max in 24hrs   Temp 98.1 °F (36.7 °C) Temp  Min: 97.7 °F (36.5 °C)  Max: 98.1 °F (36.7 °C)   Pulse (!) 120 Pulse  Min: 82  Max: 125   Resp 22 Resp  Min: 15  Max: 36   /79 BP  Min: 120/79  Max: 135/83   O2 Sat 97 % SpO2  Min: 93 %  Max: 98 %      Intake/Output Summary (Last 24 hours) at 8/26/2024 0124  Last data filed at 8/25/2024 2151  Gross per 24 hour   Intake 1344.66 ml   Output 7150 ml   Net -5805.34 ml       Diet Cardiovascular; Cardiac; Consistent Carbohydrate Diet Level 3 (6 carb servings/90 grams CHO/meal), Fluid Restriction 2000 ML    Invasive Monitoring   Arterial Line  Sonia /69  Arterial Line BP  Min: 125/56  Max: 183/100   MAP 86 mmHg  Arterial Line MAP (mmHg)  Min: 75 mmHg  Max: 128 mmHg           Physical Exam   Physical Exam  Skin:     General: Skin is warm and dry.      Capillary Refill: Capillary refill takes less than 2 seconds.   HENT:      Mouth/Throat:      Mouth: Mucous membranes are dry.   Neck:      Vascular: Central line present.   Cardiovascular:      Rate and Rhythm: Tachycardia present. Rhythm irregularly irregular.   Musculoskeletal:      Right lower leg: No edema.      Left lower leg: No edema.   Abdominal: General: There is distension.     Palpations: Abdomen is soft.      Tenderness: There is no abdominal tenderness.   Constitutional:       General: He is not in acute distress.     Interventions: Cervical collar in place.   Pulmonary:      Effort: Tachypnea present.      Breath sounds: No wheezing, rhonchi or rales.   Neurological:      General: No focal deficit present.      Mental Status: He  is alert and oriented to person, place and time.   Genitourinary/Anorectal:  Villa present.          Diagnostic Studies      EKG: Afib with RVR, acute pericarditis     Medications:  Scheduled PRN   acetaminophen, 975 mg, Q6H SRIDHAR  amiodarone, 200 mg, BID With Meals  bumetanide, 1 mg, Q8H  cefazolin, 2,000 mg, Q6H  chlorhexidine, 15 mL, Q12H SRIDHAR  colchicine, 0.6 mg, BID  docusate sodium, 100 mg, BID  methocarbamol, 750 mg, Q6H SRIDHAR  polyethylene glycol, 17 g, Daily  predniSONE, 30 mg, Daily  senna, 1 tablet, Daily      calcium carbonate, 1,000 mg, Daily PRN  heparin (porcine), 2,000 Units, Q6H PRN  heparin (porcine), 4,000 Units, Q6H PRN  HYDROmorphone, 0.5 mg, Q3H PRN  ondansetron, 4 mg, Q6H PRN  oxyCODONE, 10 mg, Q4H PRN  oxyCODONE, 5 mg, Q4H PRN       Continuous    heparin (porcine), 3-20 Units/kg/hr (Order-Specific), Last Rate: 19 Units/kg/hr (08/25/24 2050)  insulin regular (HumuLIN R,NovoLIN R) 1 Units/mL in sodium chloride 0.9 % 100 mL infusion, 0.3-21 Units/hr, Last Rate: 2 Units/hr (08/25/24 2203)         Labs:    CBC    Recent Labs     08/24/24  0403 08/25/24  0405   WBC 22.20* 17.18*   HGB 10.4* 10.2*   HCT 34.2* 33.4*    366     BMP    Recent Labs     08/25/24  1009 08/25/24  1629   SODIUM 134* 134*   K 4.5 4.3   CL 93* 91*   CO2 27 28   AGAP 14* 15*   BUN 63* 66*   CREATININE 3.13* 3.05*   CALCIUM 9.3 9.4       Coags    Recent Labs     08/24/24  0201 08/25/24  0405   INR  --  1.56*   PTT 62* 61*        Additional Electrolytes  Recent Labs     08/24/24  1551 08/25/24  0405   MG 2.5 2.2   PHOS 7.7* 7.1*   CAIONIZED  --  1.12          Blood Gas    No recent results  Recent Labs     08/25/24  0405   PHVEN 7.275*   KBB1QRR 59.9*   PO2VEN 46.6*   YGI3DGL 27.2   BEVEN -0.5   A5AURQE 73.1    LFTs  Recent Labs     08/24/24  0403 08/25/24  0405   * 81*   AST 2,452* 917*   ALKPHOS 179* 188*   ALB 3.3* 3.2*   TBILI 0.71 0.45       Infectious  No recent results  Glucose  Recent Labs     08/24/24  1226  08/25/24  0406 08/25/24  1009 08/25/24  1629   GLUC 123 112 146* 138               TONY WildeC

## 2024-08-26 NOTE — PROGRESS NOTES
Cardiology Progress Note - Augustus Coffman 68 y.o. male MRN: [id number]    Unit/Bed#: [facility]-310-01 Encounter: [id number]      Assessment:  Principal Problem:    MSSA bacteremia  Active Problems:    History of hypertension    Diabetic peripheral neuropathy (HCC)    Type 2 diabetes mellitus (HCC)    FINA (acute kidney injury) on CKD stage 2(HCC)    Acute osteomyelitis of cervical spine (HCC)    Transaminitis    New onset a-fib ([facility])    Hyperkalemia    Acute pericarditis    Diabetic ulcer of right foot ([facility])    Acute respiratory insufficiency    Acute neck pain    Assessment/Plan:     Acute pericarditis with moderate pericardial effusion  -Given prolonged Staph aureus bacteremia suspect this may be purulent pericarditis  -TTE on 8/22 showed moderate pericardial effusion without findings of tamponade and mitral valve with hyperechoic thickening at the tips consistent with most likely calcification  --Repeat TTE on 8/24 showed LVEF 45%, again no findings of tamponade  --Thoracic surgery evaluated, no plan for intervention as long as echo continues to show no tamponade physiology  - Will repeat limited TTE in 24-48 hours to assess for stability or progression of effusion  --Will need eventual LUCRECIA to rule out endocarditis. At this time blood cultures from 8/23 remain no growth at 48 hours, so will hold off on LUCRECIA for now  --Per ID, recommend getting a pericardiocentesis for fluid cultures/cell counts  -Continue colchicine 0.6 mg BID and prednisone 30 mg daily. Unable to initiate NSAIDs 2/2 FINA     2.  New onset atrial fibrillation with RVR  - Switched from metoprolol tartrate to amiodarone on 8/23 due to new hypotension in setting of distributive shock  -- Continue amiodarone 200 mg BID  -- Continue Lopressor 25 mg BID  - Continue heparin drip for anticoagulation  - Continue to monitor heart rate trends on telemetry     3.  MSSA bacteremia with cervical discitis/osteomyelitis  - Further management per infectious disease and  "primary team     4.  Transaminitis  - Continue to monitor LFTs closely      5.  Hypertension  - Blood pressure previously running on softer side, so metoprolol tartrate was held  - Resume metoprolol tartrate today as blood pressures have improved and pt remains tachycardic  - Continue to monitor blood pressures closely      6.  Thoracic aortic aneurysm  - Continue with periodic surveillance     7. Aortic stenosis  - Continue periodic outpatient surveillance        Case discussed and reviewed with Dr. Rayo. Please wait for final recommendations from Dr. Rayo. Thank you for involving us in the care of your patient.    Subjective:   Patient seen and examined.  No significant events overnight. Patient is on 5.5 - 6L NC, reports breathing and chest pain are a little better today. Rates chest pain 1-2/10. Has had nausea for the past few weeks with mild relief with antiemetics.    Objective:     Vitals: Blood pressure 108/82, pulse (!) 129, temperature 97.8 °F (36.6 °C), temperature source Oral, resp. rate (!) 23, height 6' 3\" (1.905 m), weight 122 kg (268 lb 8.3 oz), SpO2 97%., Body mass index is 33.56 kg/m².,   Orthostatic Blood Pressures      Flowsheet Row Most Recent Value   Blood Pressure 108/82 filed at 08/26/2024 0326   Patient Position - Orthostatic VS Sitting filed at 08/26/2024 0700              Intake/Output Summary (Last 24 hours) at 8/26/2024 0828  Last data filed at 8/26/2024 0555  Gross per 24 hour   Intake 492.31 ml   Output 6300 ml   Net -5807.69 ml       On telemetry review, patient noted to be in atrial fibrillation since 6 PM yesterday, with HR ranging between 109-131.      Physical Exam:    GEN: Augustus Coffman appears well, alert and oriented x 3, pleasant and cooperative   HEENT: pupils equal, round, and reactive to light; extraocular muscles intact  NECK: supple, no carotid bruits   HEART: irregular rhythm, tachycardic, normal S1 and S2, no murmurs, clicks, gallops or rubs   LUNGS: clear " to auscultation bilaterally; no wheezes, rales, or rhonchi   ABDOMEN: normal bowel sounds, soft, no tenderness, no distention  EXTREMITIES: peripheral pulses normal; no clubbing, cyanosis, or edema  NEURO: no focal findings   SKIN: normal without suspicious lesions on exposed skin    Medications:      Current Facility-Administered Medications:     acetaminophen (TYLENOL) tablet 975 mg, 975 mg, Oral, Q6H [facility], Ophelia Leon PA-C, 975 mg at 08/25/24 2147    amiodarone tablet 200 mg, 200 mg, Oral, BID With Meals, Ophelia Leon PA-C, 200 mg at 08/25/24 1759    bumetanide (BUMEX) injection 1 mg, 1 mg, Intravenous, Q8H, Ophelia Leon PA-C, 1 mg at 08/26/24 0528    calcium carbonate (TUMS) chewable tablet 1,000 mg, 1,000 mg, Oral, Daily PRN, Breonna Santana PA-C    ceFAZolin (ANCEF) IVPB (premix in dextrose) 2,000 mg 50 mL, 2,000 mg, Intravenous, Q6H, Analisa Faulkner MD, Last Rate: 100 mL/hr at 08/26/24 0528, 2,000 mg at 08/26/24 0528    chlorhexidine (PERIDEX) 0.12 % oral rinse 15 mL, 15 mL, Mouth/Throat, Q12H [facility], Breonna Santana PA-C, 15 mL at 08/25/24 2147    colchicine (COLCRYS) tablet 0.6 mg, 0.6 mg, Oral, BID, Breonna Santana PA-C, 0.6 mg at 08/25/24 1759    docusate sodium (COLACE) capsule 100 mg, 100 mg, Oral, BID, Breonna Santana PA-C, 100 mg at 08/25/24 1759    heparin (porcine) 25,000 units in 0.45% NaCl 250 mL infusion (premix), 3-20 Units/kg/hr (Order-Specific), Intravenous, Titrated, Breonna Santana PA-C, Last Rate: 18.9 mL/hr at 08/26/24 0747, 21 Units/kg/hr at 08/26/24 0747    heparin (porcine) injection 2,000 Units, 2,000 Units, Intravenous, Q6H PRN, Breonna Santana PA-C, 2,000 Units at 08/26/24 0745    heparin (porcine) injection 4,000 Units, 4,000 Units, Intravenous, Q6H PRN, Breonna Santana PA-C    HYDROmorphone (DILAUDID) injection 0.5 mg, 0.5 mg, Intravenous, Q3H PRN, Breonna Santana PA-C, 0.5 mg at 08/24/24 1301    insulin regular (HumuLIN R,NovoLIN R) 1 Units/mL in sodium chloride 0.9  % 100 mL infusion, 0.3-21 Units/hr, Intravenous, Titrated, Ophelia Leon PA-C, Last Rate: 2 mL/hr at 08/25/24 2203, 2 Units/hr at 08/25/24 2203    methocarbamol (ROBAXIN) tablet 750 mg, 750 mg, Oral, Q6H [facility], Breonna Santana PA-C, 750 mg at 08/25/24 2147    ondansetron (ZOFRAN) injection 4 mg, 4 mg, Intravenous, Q6H PRN, Breonna Santana PA-C    oxyCODONE (ROXICODONE) immediate release tablet 10 mg, 10 mg, Oral, Q4H PRN, Breonna Santana PA-C, 10 mg at 08/25/24 0019    oxyCODONE (ROXICODONE) IR tablet 5 mg, 5 mg, Oral, Q4H PRN, Breonna Santnaa PA-C    polyethylene glycol (MIRALAX) packet 17 g, 17 g, Oral, Daily, Breonna Santana PA-C, 17 g at 08/25/24 0827    predniSONE tablet 30 mg, 30 mg, Oral, Daily, Breonna Santana PA-C, 30 mg at 08/25/24 0827    senna (SENOKOT) tablet 8.6 mg, 1 tablet, Oral, Daily, Breonna Santana PA-C, 8.6 mg at 08/25/24 0827     Labs & Results:    Results from last 7 days   Lab Units 08/24/24  0201   CK TOTAL U/L 22*     Results from last 7 days   Lab Units 08/26/24  0455 08/25/24  0405 08/24/24  0403   WBC Thousand/uL 14.22* 17.18* 22.20*   HEMOGLOBIN g/dL 11.2* 10.2* 10.4*   HEMATOCRIT % 36.9 33.4* 34.2*   PLATELETS Thousands/uL 434* 366 383         Results from last 7 days   Lab Units 08/26/24  0508 08/25/24  1629 08/25/24  1009 08/25/24  0406 08/25/24  0405 08/24/24  0620 08/24/24  0403   POTASSIUM mmol/L 4.0 4.3 4.5   < >  --    < >  --    CHLORIDE mmol/L 91* 91* 93*   < >  --    < >  --    CO2 mmol/L 30 28 27   < >  --    < >  --    BUN mg/dL 70* 66* 63*   < >  --    < >  --    CREATININE mg/dL 2.73* 3.05* 3.13*   < >  --    < >  --    CALCIUM mg/dL 9.7 9.4 9.3   < >  --    < >  --    ALK PHOS U/L 302*  --   --   --  188*  --  179*   ALT U/L 33  --   --   --  81*  --  418*   AST U/L 513*  --   --   --  917*  --  2,452*    < > = values in this interval not displayed.     Results from last 7 days   Lab Units 08/26/24  0453 08/25/24  0405 08/24/24  0201 08/22/24  1555  08/22/24  0926   INR   --  1.56*  --   --  1.21*   PTT seconds 59* 61* 62*   < > 40*    < > = values in this interval not displayed.     Results from last 7 days   Lab Units 08/26/24  0508 08/25/24  0405 08/24/24  1551   MAGNESIUM mg/dL 2.3 2.2 2.5

## 2024-08-26 NOTE — ASSESSMENT & PLAN NOTE
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 3 mm epidural phlegmon/small abscess is also suspected. There is moderate resultant central stenosis and mild mass effect on the cord  Neurosurgery consulted   Upright xrays completed  Recommending wearing of C-collar which is currently in place  Recommending 6 weeks IV antibiotics and follow-up repeat imaging  Q4h neuro checks

## 2024-08-26 NOTE — WOUND OSTOMY CARE
Consult Note - Wound   Augustus Coffman 68 y.o. male MRN: [id number]  Unit/Bed#: PPHP-310-01 Encounter: [id number]        History and Present Illness:  Patient is a 67 yo male that was admitted to [facility] for treatment of MSSA Bacteremia.  Patient has a PMH of DM2, diabetic foot wound, HTN. Continent of bowel and bladder is managed via internal urinary catheter.      Wound Care was consulted for foot wound.      Podiatry consulted and following right foot wound. Will defer treatment to area per podiatry's recommendations. Preventative orders in place for heels and sacro-buttocks.     Orders listed below and wound care will sign off, call or Secure Chat Message with questions.     Skin Care Plan:  1-Right Foot Wound: Per Podiatry recommendations   2-Turn/reposition q2h or when medically stable for pressure re-distribution on skin .  3-Elevate heels to offload pressure.  4-Moisturize skin daily with skin nourishing cream  5-Ehob cushion in chair when out of bed.  6-Preventative Hydraguard to bilateral sacro-buttocks and heels BID and PRN.               Sandi Dougherty RN, BSN, CWOCN

## 2024-08-26 NOTE — DISCHARGE INSTR - OTHER ORDERS
Skin Care Plan:  1-Right Foot Wound: Per Podiatry recommendations   2-Turn/reposition q2h or when medically stable for pressure re-distribution on skin .  3-Elevate heels to offload pressure.  4-Moisturize skin daily with skin nourishing cream  5-Ehob cushion in chair when out of bed.  6-Preventative Hydraguard to bilateral sacro-buttocks and heels BID and PRN.

## 2024-08-26 NOTE — ASSESSMENT & PLAN NOTE
Developed new onset hypotension in the setting of afib with RVR  Weaned off levophed/vasopressin 8/24

## 2024-08-26 NOTE — TELEPHONE ENCOUNTER
08/28/2024- PT STILL IN HOSPITAL     08/26/2024- PT IS IN [facility]   09/23/2024- 4 WK HFU W/ cervical Xrays W/ AP     NITESH [place] [employer]  4 wk follow-up AP with cervical Xrays for cervical osteomyelitis.

## 2024-08-26 NOTE — HOSPITAL COURSE
8/20 Presented to [place] ED after outpatient MRI revealed cervical discitis with potential phlegmon vs small epidural abscess. Patient had prior admission with MSSA bacteremia. Repeat blood cultures obtained. Neurosurgery consulted - recommended C-collar.     8/21 Blood cultures positive for MSSA. ID consulted.     8/22 New onset afib with diffuse ST elevations. Echocardiogram revealed moderate pericardial effusion.     8/23 Developed FINA and hyperkalemia. Nephrology consulted. Patient with worsening tachycardia and hypotension. He was transferred to ICU - [facility] and jose placed. Afib treated with amiodarone bolus and infusion. Transferred to [facility] for further treatment. Hyperkalemia treated with aggressive medical therapy. Worsening hypotension requiring levophed and vasopressin.     8/24 Continued hyperkalemia - eventual improvement with lokelma and bumex infusion. Weaned off vasopressors and converted to NSR.     8/25 Transitioned off bumex infusion. Converted back to afib. Neurosurgery recommending C-collar given changes seen on upright xrays.

## 2024-08-26 NOTE — CASE MANAGEMENT
Case Management Discharge Planning Note    Patient name Augutsus Coffman  Location [facility]-310/[facility]-310-01 MRN [id number]  : 1955 Date 2024       Current Admission Date: 2024  Current Admission Diagnosis:MSSA bacteremia   Patient Active Problem List    Diagnosis Date Noted Date Diagnosed    Hyperkalemia 2024     Acute pericarditis 2024     Diabetic ulcer of right foot (HCC) 2024     Acute respiratory insufficiency 2024     Acute neck pain 2024     MSSA bacteremia 2024     New onset a-fib ([facility]) 2024     Acute osteomyelitis of cervical spine ([facility]) 2024     Transaminitis 2024     Acute renal failure with oliguria  ([facility]) 2024     Neck pain 2024     Sepsis without acute organ dysfunction ([facility]) 2024     Acute respiratory failure with hypoxia ([facility]) 2024     Acute hyponatremia 2024     Chronic diastolic CHF (congestive heart failure) ([facility]) 2024     Encounter for deep vein thrombosis (DVT) prophylaxis 2024     Hyponatremia 2024     Aortic stenosis with trileaflet valve 07/15/2024     Ectatic thoracic aorta ([facility]) 2024     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 ([facility]) 10/09/2023     Vitamin D deficiency 2023     Stage 3a chronic kidney disease ([facility]) 2023     Follicular lymphoma grade I of lymph nodes of multiple sites ([facility]) 08/15/2022     Obesity, morbid ([facility]) 2022     Other proteinuria 2022     Stage 2 chronic kidney disease 09/15/2021     Hypertensive kidney disease with stage 2 chronic kidney disease 09/15/2021     Rash 2021     Chronic venous hypertension (idiopathic) with ulcer of right lower extremity ([facility]) 2021     GI bleed 2021     Pleural effusion 2021     Heart murmur 2021     Retroperitoneal hematoma 2021     Mesenteric lymphadenopathy 2021     Acute blood loss anemia 2021     FINA (acute kidney injury) on CKD stage 2([facility])  07/08/2021     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     Stasis dermatitis of both legs 04/17/2019     Hammer toe of right foot 02/19/2019     Obesity with body mass index 30 or greater 10/31/2016     Diabetes 1.5, 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     History of hypertension 10/06/2016     Hypercholesteremia 10/06/2016     Diabetic peripheral neuropathy (HCC) 11/10/2014     Gout 12/06/2007     Type 2 diabetes mellitus (HCC) 12/06/2007       LOS (days): 3  Geometric Mean LOS (GMLOS) (days):   Days to GMLOS:     OBJECTIVE:  Risk of Unplanned Readmission Score: 29.18         Current admission status: Inpatient   Preferred Pharmacy:   [id number]  [place] - [address]  [address] [facility] [address]  Phone: [phone] Fax: [phone]    [id number] [place] - [address] [address]  Phone: [phone] Fax: [phone]    Primary Care Provider: Gwen Lazo DO    Primary Insurance: MEDICARE  Secondary Insurance: [facility]    DISCHARGE DETAILS:    Discharge planning discussed with:: Patient and patient's wife at bedside  Freedom of Choice: Yes  Comments - Freedom of Choice: CM spoke to patient and patient's wife about rehab recommendations. Patient is agreeable to blanket referrals being sent.  CM contacted family/caregiver?: Yes  Were Treatment Team discharge recommendations reviewed with patient/caregiver?: Yes  Did patient/caregiver verbalize understanding of patient care needs?: Yes  Were patient/caregiver advised of the risks associated with not following Treatment Team discharge recommendations?: Yes    Contacts  Patient Contacts: Patient's wife at bedside  Relationship to Patient:: Family  Contact Method: In Person      Other Referral/Resources/Interventions Provided:  Referral Comments: [place] rehab referrals being sent in [place].

## 2024-08-26 NOTE — PLAN OF CARE
Problem: PHYSICAL THERAPY ADULT  Goal: Performs mobility at highest level of function for planned discharge setting.  See evaluation for individualized goals.  Description: Treatment/Interventions: Functional transfer training, LE strengthening/ROM, Elevations, Therapeutic exercise, Endurance training, Equipment eval/education, Bed mobility, Gait training, Spoke to nursing, Spoke to case management, OT          See flowsheet documentation for full assessment, interventions and recommendations.  Note: Prognosis: Good  Problem List: Decreased strength, Decreased endurance, Impaired balance, Decreased mobility, Obesity, Orthopedic restrictions, Pain  Assessment: Pt is 68 y.o. male admitted with hx of neck pain and Dx of MSSA bacteremia,  C5-C6 osteomyelitis/discitis with epidural phlegmon/abscess, Pericardial effusion with potential pericarditis, FINA, Acute transaminitis, Acute respiratory insufficiency, and new onset of a-fib. Pt 's comorbidities affecting POC include: Arthritis, Chronic kidney disease, Diabetes mellitus (HCC), Hypertension, Obesity and Diabetic ulcer of right foot  and personal factors of: LARY and steps in the house. Pt's clinical presentation is currently  unstable/unpredictable which is evident in ongoing telem monitoring incl a-line in place, suppl O2 needs, abn lab values and inability to progress further w/ mobilization due to elevated HR and SOB. Pt presents w/ pain and guarding, overall weakness, incl decreased LE strength, decreased functional endurance and activity tolerance, impaired balance, transfers difficulty and inability to amb at this time. Will cont to follow pt in PT for progressive mobilization to address above functional deficits and to max level of (I), endurance, and safety. D/C recommendations are outlined below. Will cont to follow until then.  Barriers to Discharge: Inaccessible home environment     Rehab Resource Intensity Level, PT: I (Maximum Resource Intensity)    See  flowsheet documentation for full assessment.

## 2024-08-26 NOTE — ASSESSMENT & PLAN NOTE
Follows as an outpatient with wound care  Likely source of MSSA bacteremia   Continue local wound care  Podiatry consulted  WBAT

## 2024-08-26 NOTE — PLAN OF CARE
Problem: OCCUPATIONAL THERAPY ADULT  Goal: Performs self-care activities at highest level of function for planned discharge setting.  See evaluation for individualized goals.  Description: Treatment Interventions: ADL retraining, Functional transfer training, Endurance training, Patient/family training, Equipment evaluation/education, Continued evaluation, Energy conservation, Compensatory technique education          See flowsheet documentation for full assessment, interventions and recommendations.   Outcome: Progressing  Note: Limitation: Decreased ADL status, Decreased Safe judgement during ADL, Decreased endurance, Decreased self-care trans, Decreased high-level ADLs  Prognosis: Fair  Assessment: Pt is a 68 y.o. male who was admitted to [facility] on 8/23/2024 with MSSA bacteremia, possibly due to R foot ulcer. Pt seen for an OT evaluation per active OT orders, C-collar donned at all times, WBAT RLE in surgical shoe maintained throughout transfers.  Pt  has a past medical history of Arthritis, Cancer (HCC), Chronic kidney disease, Diabetes mellitus (HCC), Follicular lymphoma (HCC), GERD (gastroesophageal reflux disease), Heart murmur, High cholesterol, Hypertension, Kidney stone, and Obesity. Pt lives in a split-level home with 10 LARY, has 10 steps to 2nd fl bed/bath with walk-in shower and standard toilet. Pta, pt was independent w/ ADL/IADL and functional mobility, was (+) driving and was not using any DME at baseline. Currently, pt is Min-Mod Ax1 for UB ADL, Max Ax1 for LB ADL, and completed transfers/FM w Mod Ax2 with RW. Pt currently presents with impairments in the following categories -steps to enter environment, difficulty performing ADLS, and limited insight into deficits activity tolerance, endurance, standing balance/tolerance, safety , judgement , and attention . These impairments, as well as pt's fatigue, SOB, JUAREZ, pain, spinal precautions, and risk for falls  limit pt's ability to  safely engage in all baseline areas of occupation, includinggrooming, bathing, dressing, toileting, functional mobility/transfers, and community mobility.    The patient's raw score on the AM-PAC Daily Activity Inpatient Short Form is 15. A raw score of less than 19 suggests the patient may benefit from discharge to post-acute rehabilitation services. Please refer to the recommendation of the Occupational Therapist for safe discharge planning. Pt would benefit from continued acute OT services throughout hospital course and following D/C. Plan for OT interventions 2-3x per week. From OT standpoint, recommend level I services pending progress upon D/C. Pt was left seated in bedside chair with all needs within reach.     Rehab Resource Intensity Level, OT: I (Maximum Resource Intensity) (pending progress)

## 2024-08-26 NOTE — ASSESSMENT & PLAN NOTE
Baseline Cr 1-1.2  Developed FINA 8/23 with associated hyperkalemia    Plan:  Nephrology following, appreciate their recommendations  Maintain avilez  Avoid nephrotoxins  Strict I/Os  Trend renal function  Continue diuresis - can likely decrease to daily dosing

## 2024-08-26 NOTE — ASSESSMENT & PLAN NOTE
Developed FINA with hyperkalemia  Received medical therapy with insulin, calcium, bicarb, lasix, lokelma  Trending down currently with diuresis

## 2024-08-26 NOTE — ASSESSMENT & PLAN NOTE
8/22 Echo: Limited echo for assessment of endocarditis.  The patient's aortic valve is difficult to assess due to calcification but there is no new significant regurgitation.  The mitral valve has hyperechoic thickening at the tips consistent with most likely calcification, these were seen on the echocardiograms in July.  The tricuspid valve similarly was not well-visualized but no new regurgitation.  The pulmonary valve was not well-visualized. Off note patient has new moderate pericardial effusion without specific echocardiographic signs of tamponade  Patient with symptoms consistent with pericarditis  Plan to repeat echo in the next 24-48 hours    Plan:  Cardiology consulted  Colchicine 0.6 mg BID  Unable to initiate NSAIDs 2/2 FINA  Prednisone 30 mg daily  Close hemodynamic monitoring

## 2024-08-27 ENCOUNTER — APPOINTMENT (OUTPATIENT)
Dept: RADIOLOGY | Facility: HOSPITAL | Age: 69
DRG: 853 | End: 2024-08-27
Payer: MEDICARE

## 2024-08-27 ENCOUNTER — APPOINTMENT (INPATIENT)
Dept: NON INVASIVE DIAGNOSTICS | Facility: HOSPITAL | Age: 69
DRG: 853 | End: 2024-08-27
Payer: MEDICARE

## 2024-08-27 PROBLEM — E87.5 HYPERKALEMIA: Status: RESOLVED | Noted: 2024-08-23 | Resolved: 2024-08-27

## 2024-08-27 PROBLEM — K59.00 CONSTIPATION: Status: ACTIVE | Noted: 2024-08-27

## 2024-08-27 LAB
ALBUMIN SERPL BCG-MCNC: 3.2 G/DL (ref 3.5–5)
ALP SERPL-CCNC: 268 U/L (ref 34–104)
ALT SERPL W P-5'-P-CCNC: 15 U/L (ref 7–52)
ANION GAP SERPL CALCULATED.3IONS-SCNC: 13 MMOL/L (ref 4–13)
ANION GAP SERPL CALCULATED.3IONS-SCNC: 14 MMOL/L (ref 4–13)
ANISOCYTOSIS BLD QL SMEAR: PRESENT
APICAL FOUR CHAMBER EJECTION FRACTION: 56 %
APTT PPP: 34 SECONDS (ref 23–34)
APTT PPP: 35 SECONDS (ref 23–34)
APTT PPP: 55 SECONDS (ref 23–34)
AST SERPL W P-5'-P-CCNC: 257 U/L (ref 13–39)
ATRIAL RATE: 130 BPM
BACTERIA BLD CULT: ABNORMAL
BACTERIA BLD CULT: NORMAL
BACTERIA BLD CULT: NORMAL
BASOPHILS # BLD MANUAL: 0 THOUSAND/UL (ref 0–0.1)
BASOPHILS NFR MAR MANUAL: 0 % (ref 0–1)
BILIRUB SERPL-MCNC: 0.45 MG/DL (ref 0.2–1)
BSA FOR ECHO PROCEDURE: 2.41 M2
BUN SERPL-MCNC: 90 MG/DL (ref 5–25)
BUN SERPL-MCNC: 90 MG/DL (ref 5–25)
CA-I BLD-SCNC: 1.12 MMOL/L (ref 1.12–1.32)
CALCIUM ALBUM COR SERPL-MCNC: 9.9 MG/DL (ref 8.3–10.1)
CALCIUM SERPL-MCNC: 9.3 MG/DL (ref 8.4–10.2)
CALCIUM SERPL-MCNC: 9.4 MG/DL (ref 8.4–10.2)
CHLORIDE SERPL-SCNC: 89 MMOL/L (ref 96–108)
CHLORIDE SERPL-SCNC: 91 MMOL/L (ref 96–108)
CO2 SERPL-SCNC: 32 MMOL/L (ref 21–32)
CO2 SERPL-SCNC: 33 MMOL/L (ref 21–32)
CREAT SERPL-MCNC: 2.24 MG/DL (ref 0.6–1.3)
CREAT SERPL-MCNC: 2.59 MG/DL (ref 0.6–1.3)
EOSINOPHIL # BLD MANUAL: 0 THOUSAND/UL (ref 0–0.4)
EOSINOPHIL NFR BLD MANUAL: 0 % (ref 0–6)
ERYTHROCYTE [DISTWIDTH] IN BLOOD BY AUTOMATED COUNT: 17.2 % (ref 11.6–15.1)
FRACTIONAL SHORTENING: 25 (ref 28–44)
GFR SERPL CREATININE-BSD FRML MDRD: 24 ML/MIN/1.73SQ M
GFR SERPL CREATININE-BSD FRML MDRD: 29 ML/MIN/1.73SQ M
GLUCOSE SERPL-MCNC: 115 MG/DL (ref 65–140)
GLUCOSE SERPL-MCNC: 118 MG/DL (ref 65–140)
GLUCOSE SERPL-MCNC: 120 MG/DL (ref 65–140)
GLUCOSE SERPL-MCNC: 120 MG/DL (ref 65–140)
GLUCOSE SERPL-MCNC: 123 MG/DL (ref 65–140)
GLUCOSE SERPL-MCNC: 132 MG/DL (ref 65–140)
GLUCOSE SERPL-MCNC: 135 MG/DL (ref 65–140)
GLUCOSE SERPL-MCNC: 144 MG/DL (ref 65–140)
GLUCOSE SERPL-MCNC: 145 MG/DL (ref 65–140)
GLUCOSE SERPL-MCNC: 190 MG/DL (ref 65–140)
GLUCOSE SERPL-MCNC: 192 MG/DL (ref 65–140)
GLUCOSE SERPL-MCNC: 207 MG/DL (ref 65–140)
GLUCOSE SERPL-MCNC: 235 MG/DL (ref 65–140)
GRAM STN SPEC: ABNORMAL
HCT VFR BLD AUTO: 34.6 % (ref 36.5–49.3)
HGB BLD-MCNC: 10.9 G/DL (ref 12–17)
INTERVENTRICULAR SEPTUM IN DIASTOLE (PARASTERNAL SHORT AXIS VIEW): 1.8 CM
INTERVENTRICULAR SEPTUM: 1.8 CM (ref 0.6–1.1)
IVC: 21 MM
LEFT INTERNAL DIMENSION IN SYSTOLE: 3.3 CM (ref 2.1–4)
LEFT VENTRICULAR INTERNAL DIMENSION IN DIASTOLE: 4.4 CM (ref 3.5–6)
LEFT VENTRICULAR POSTERIOR WALL IN END DIASTOLE: 2.2 CM
LEFT VENTRICULAR STROKE VOLUME: 42 ML
LVSV (TEICH): 42 ML
LYMPHOCYTES # BLD AUTO: 1.04 THOUSAND/UL (ref 0.6–4.47)
LYMPHOCYTES # BLD AUTO: 8 % (ref 14–44)
MACROCYTES BLD QL AUTO: PRESENT
MAGNESIUM SERPL-MCNC: 2.1 MG/DL (ref 1.9–2.7)
MCH RBC QN AUTO: 25.5 PG (ref 26.8–34.3)
MCHC RBC AUTO-ENTMCNC: 31.5 G/DL (ref 31.4–37.4)
MCV RBC AUTO: 81 FL (ref 82–98)
MICROCYTES BLD QL AUTO: PRESENT
MONOCYTES # BLD AUTO: 0.81 THOUSAND/UL (ref 0–1.22)
MONOCYTES NFR BLD: 7 % (ref 4–12)
NEUTROPHILS # BLD MANUAL: 9.67 THOUSAND/UL (ref 1.85–7.62)
NEUTS SEG NFR BLD AUTO: 84 % (ref 43–75)
NRBC BLD AUTO-RTO: 2 /100 WBC (ref 0–2)
P AXIS: 54 DEGREES
PHOSPHATE SERPL-MCNC: 4 MG/DL (ref 2.3–4.1)
PLATELET # BLD AUTO: 432 THOUSANDS/UL (ref 149–390)
PLATELET BLD QL SMEAR: ABNORMAL
PMV BLD AUTO: 9.9 FL (ref 8.9–12.7)
POIKILOCYTOSIS BLD QL SMEAR: PRESENT
POTASSIUM SERPL-SCNC: 3.2 MMOL/L (ref 3.5–5.3)
POTASSIUM SERPL-SCNC: 4 MMOL/L (ref 3.5–5.3)
PR INTERVAL: 156 MS
PROT SERPL-MCNC: 7.1 G/DL (ref 6.4–8.4)
QRS AXIS: 2 DEGREES
QRSD INTERVAL: 90 MS
QT INTERVAL: 270 MS
QTC INTERVAL: 397 MS
RA PRESSURE ESTIMATED: 8 MMHG
RBC # BLD AUTO: 4.28 MILLION/UL (ref 3.88–5.62)
RBC MORPH BLD: PRESENT
S AUREUS DNA BLD POS QL NAA+NON-PROBE: DETECTED
SL CV ECHO PERICARDIAL EFFUSION SIZE: 1.3 CM
SL CV LV EF: 50
SL CV PED ECHO LEFT VENTRICLE DIASTOLIC VOLUME (MOD BIPLANE) 2D: 85 ML
SL CV PED ECHO LEFT VENTRICLE SYSTOLIC VOLUME (MOD BIPLANE) 2D: 44 ML
SODIUM SERPL-SCNC: 136 MMOL/L (ref 135–147)
SODIUM SERPL-SCNC: 136 MMOL/L (ref 135–147)
T WAVE AXIS: 42 DEGREES
TARGETS BLD QL SMEAR: PRESENT
VARIANT LYMPHS # BLD AUTO: 1 %
VENTRICULAR RATE: 130 BPM
WBC # BLD AUTO: 11.51 THOUSAND/UL (ref 4.31–10.16)

## 2024-08-27 PROCEDURE — 93308 TTE F-UP OR LMTD: CPT

## 2024-08-27 PROCEDURE — 80048 BASIC METABOLIC PNL TOTAL CA: CPT | Performed by: NURSE PRACTITIONER

## 2024-08-27 PROCEDURE — 72146 MRI CHEST SPINE W/O DYE: CPT

## 2024-08-27 PROCEDURE — 93321 DOPPLER ECHO F-UP/LMTD STD: CPT | Performed by: INTERNAL MEDICINE

## 2024-08-27 PROCEDURE — 83735 ASSAY OF MAGNESIUM: CPT | Performed by: NURSE PRACTITIONER

## 2024-08-27 PROCEDURE — 93325 DOPPLER ECHO COLOR FLOW MAPG: CPT | Performed by: INTERNAL MEDICINE

## 2024-08-27 PROCEDURE — 82330 ASSAY OF CALCIUM: CPT | Performed by: NURSE PRACTITIONER

## 2024-08-27 PROCEDURE — 99233 SBSQ HOSP IP/OBS HIGH 50: CPT | Performed by: INTERNAL MEDICINE

## 2024-08-27 PROCEDURE — 80053 COMPREHEN METABOLIC PANEL: CPT | Performed by: NURSE PRACTITIONER

## 2024-08-27 PROCEDURE — 85027 COMPLETE CBC AUTOMATED: CPT | Performed by: NURSE PRACTITIONER

## 2024-08-27 PROCEDURE — 85730 THROMBOPLASTIN TIME PARTIAL: CPT | Performed by: NURSE PRACTITIONER

## 2024-08-27 PROCEDURE — 99223 1ST HOSP IP/OBS HIGH 75: CPT

## 2024-08-27 PROCEDURE — 93005 ELECTROCARDIOGRAM TRACING: CPT

## 2024-08-27 PROCEDURE — 85007 BL SMEAR W/DIFF WBC COUNT: CPT | Performed by: NURSE PRACTITIONER

## 2024-08-27 PROCEDURE — 85730 THROMBOPLASTIN TIME PARTIAL: CPT | Performed by: ANESTHESIOLOGY

## 2024-08-27 PROCEDURE — 97110 THERAPEUTIC EXERCISES: CPT

## 2024-08-27 PROCEDURE — 99232 SBSQ HOSP IP/OBS MODERATE 35: CPT | Performed by: INTERNAL MEDICINE

## 2024-08-27 PROCEDURE — 82948 REAGENT STRIP/BLOOD GLUCOSE: CPT

## 2024-08-27 PROCEDURE — 97530 THERAPEUTIC ACTIVITIES: CPT

## 2024-08-27 PROCEDURE — 93308 TTE F-UP OR LMTD: CPT | Performed by: INTERNAL MEDICINE

## 2024-08-27 PROCEDURE — 93010 ELECTROCARDIOGRAM REPORT: CPT | Performed by: INTERNAL MEDICINE

## 2024-08-27 PROCEDURE — 93325 DOPPLER ECHO COLOR FLOW MAPG: CPT

## 2024-08-27 PROCEDURE — 84100 ASSAY OF PHOSPHORUS: CPT | Performed by: NURSE PRACTITIONER

## 2024-08-27 PROCEDURE — 72148 MRI LUMBAR SPINE W/O DYE: CPT

## 2024-08-27 PROCEDURE — 85730 THROMBOPLASTIN TIME PARTIAL: CPT | Performed by: STUDENT IN AN ORGANIZED HEALTH CARE EDUCATION/TRAINING PROGRAM

## 2024-08-27 PROCEDURE — 99233 SBSQ HOSP IP/OBS HIGH 50: CPT | Performed by: ANESTHESIOLOGY

## 2024-08-27 PROCEDURE — 93321 DOPPLER ECHO F-UP/LMTD STD: CPT

## 2024-08-27 RX ORDER — HEPARIN SODIUM 1000 [USP'U]/ML
2000 INJECTION, SOLUTION INTRAVENOUS; SUBCUTANEOUS EVERY 6 HOURS PRN
Status: DISCONTINUED | OUTPATIENT
Start: 2024-08-27 | End: 2024-08-28

## 2024-08-27 RX ORDER — AMIODARONE HYDROCHLORIDE 150 MG/3ML
INJECTION, SOLUTION INTRAVENOUS
Status: DISPENSED
Start: 2024-08-27 | End: 2024-08-27

## 2024-08-27 RX ORDER — PREDNISONE 20 MG/1
20 TABLET ORAL DAILY
Status: COMPLETED | OUTPATIENT
Start: 2024-08-28 | End: 2024-08-28

## 2024-08-27 RX ORDER — POLYETHYLENE GLYCOL 3350 17 G/17G
17 POWDER, FOR SOLUTION ORAL ONCE
Status: COMPLETED | OUTPATIENT
Start: 2024-08-27 | End: 2024-08-27

## 2024-08-27 RX ORDER — ALBUMIN, HUMAN INJ 5% 5 %
12.5 SOLUTION INTRAVENOUS ONCE
Status: COMPLETED | OUTPATIENT
Start: 2024-08-27 | End: 2024-08-27

## 2024-08-27 RX ORDER — HEPARIN SODIUM 1000 [USP'U]/ML
4000 INJECTION, SOLUTION INTRAVENOUS; SUBCUTANEOUS EVERY 6 HOURS PRN
Status: DISCONTINUED | OUTPATIENT
Start: 2024-08-27 | End: 2024-08-28

## 2024-08-27 RX ORDER — AMOXICILLIN 250 MG
2 CAPSULE ORAL 2 TIMES DAILY
Status: DISCONTINUED | OUTPATIENT
Start: 2024-08-27 | End: 2024-09-03 | Stop reason: HOSPADM

## 2024-08-27 RX ORDER — BUMETANIDE 2 MG/1
2 TABLET ORAL DAILY
Status: DISCONTINUED | OUTPATIENT
Start: 2024-08-28 | End: 2024-08-28

## 2024-08-27 RX ORDER — POTASSIUM CHLORIDE 14.9 MG/ML
20 INJECTION INTRAVENOUS ONCE
Status: COMPLETED | OUTPATIENT
Start: 2024-08-27 | End: 2024-08-27

## 2024-08-27 RX ORDER — POTASSIUM CHLORIDE 1500 MG/1
40 TABLET, EXTENDED RELEASE ORAL ONCE
Status: COMPLETED | OUTPATIENT
Start: 2024-08-27 | End: 2024-08-27

## 2024-08-27 RX ORDER — PREDNISONE 10 MG/1
10 TABLET ORAL DAILY
Status: COMPLETED | OUTPATIENT
Start: 2024-08-29 | End: 2024-08-30

## 2024-08-27 RX ORDER — MAGNESIUM SULFATE HEPTAHYDRATE 40 MG/ML
2 INJECTION, SOLUTION INTRAVENOUS ONCE
Status: COMPLETED | OUTPATIENT
Start: 2024-08-27 | End: 2024-08-27

## 2024-08-27 RX ORDER — ALBUMIN, HUMAN INJ 5% 5 %
SOLUTION INTRAVENOUS
Status: COMPLETED
Start: 2024-08-27 | End: 2024-08-27

## 2024-08-27 RX ORDER — HEPARIN SODIUM 10000 [USP'U]/100ML
3-20 INJECTION, SOLUTION INTRAVENOUS
Status: DISCONTINUED | OUTPATIENT
Start: 2024-08-27 | End: 2024-08-28

## 2024-08-27 RX ORDER — POTASSIUM CHLORIDE 1500 MG/1
40 TABLET, EXTENDED RELEASE ORAL DAILY
Status: DISCONTINUED | OUTPATIENT
Start: 2024-08-28 | End: 2024-08-30

## 2024-08-27 RX ADMIN — COLCHICINE 0.6 MG: 0.6 TABLET ORAL at 08:47

## 2024-08-27 RX ADMIN — HEPARIN SODIUM 11.1 UNITS/KG/HR: 10000 INJECTION, SOLUTION INTRAVENOUS at 09:57

## 2024-08-27 RX ADMIN — AMIODARONE HYDROCHLORIDE 200 MG: 200 TABLET ORAL at 06:38

## 2024-08-27 RX ADMIN — AMIODARONE HYDROCHLORIDE 200 MG: 200 TABLET ORAL at 16:04

## 2024-08-27 RX ADMIN — CEFAZOLIN SODIUM 2000 MG: 2 SOLUTION INTRAVENOUS at 12:33

## 2024-08-27 RX ADMIN — CEFAZOLIN SODIUM 2000 MG: 2 SOLUTION INTRAVENOUS at 19:59

## 2024-08-27 RX ADMIN — ACETAMINOPHEN 975 MG: 325 TABLET ORAL at 21:34

## 2024-08-27 RX ADMIN — ALBUMIN (HUMAN) 12.5 G: 12.5 INJECTION, SOLUTION INTRAVENOUS at 10:19

## 2024-08-27 RX ADMIN — ACETAMINOPHEN 975 MG: 325 TABLET ORAL at 03:12

## 2024-08-27 RX ADMIN — SENNOSIDES AND DOCUSATE SODIUM 2 TABLET: 50; 8.6 TABLET ORAL at 19:59

## 2024-08-27 RX ADMIN — AMIODARONE HYDROCHLORIDE 150 MG: 50 INJECTION, SOLUTION INTRAVENOUS at 11:25

## 2024-08-27 RX ADMIN — HEPARIN SODIUM 4000 UNITS: 1000 INJECTION INTRAVENOUS; SUBCUTANEOUS at 17:47

## 2024-08-27 RX ADMIN — SODIUM CHLORIDE 9 UNITS/HR: 9 INJECTION, SOLUTION INTRAVENOUS at 15:19

## 2024-08-27 RX ADMIN — METHOCARBAMOL 750 MG: 750 TABLET ORAL at 21:34

## 2024-08-27 RX ADMIN — HYDROMORPHONE HYDROCHLORIDE 0.5 MG: 1 INJECTION, SOLUTION INTRAMUSCULAR; INTRAVENOUS; SUBCUTANEOUS at 01:11

## 2024-08-27 RX ADMIN — CEFAZOLIN SODIUM 2000 MG: 2 SOLUTION INTRAVENOUS at 23:08

## 2024-08-27 RX ADMIN — METHOCARBAMOL 750 MG: 750 TABLET ORAL at 08:46

## 2024-08-27 RX ADMIN — AMIODARONE HYDROCHLORIDE 1 MG/MIN: 50 INJECTION, SOLUTION INTRAVENOUS at 13:44

## 2024-08-27 RX ADMIN — ACETAMINOPHEN 975 MG: 325 TABLET ORAL at 16:04

## 2024-08-27 RX ADMIN — POLYETHYLENE GLYCOL 3350 17 G: 17 POWDER, FOR SOLUTION ORAL at 16:06

## 2024-08-27 RX ADMIN — ACETAMINOPHEN 975 MG: 325 TABLET ORAL at 09:41

## 2024-08-27 RX ADMIN — PREDNISONE 30 MG: 10 TABLET ORAL at 08:46

## 2024-08-27 RX ADMIN — SENNOSIDES AND DOCUSATE SODIUM 2 TABLET: 50; 8.6 TABLET ORAL at 08:46

## 2024-08-27 RX ADMIN — POTASSIUM CHLORIDE 20 MEQ: 14.9 INJECTION, SOLUTION INTRAVENOUS at 06:38

## 2024-08-27 RX ADMIN — METHOCARBAMOL 750 MG: 750 TABLET ORAL at 03:12

## 2024-08-27 RX ADMIN — ALBUMIN, HUMAN INJ 5% 12.5 G: 5 SOLUTION at 10:19

## 2024-08-27 RX ADMIN — CHLORHEXIDINE GLUCONATE 0.12% ORAL RINSE 15 ML: 1.2 LIQUID ORAL at 21:34

## 2024-08-27 RX ADMIN — AMIODARONE HYDROCHLORIDE 0.5 MG/MIN: 50 INJECTION, SOLUTION INTRAVENOUS at 19:31

## 2024-08-27 RX ADMIN — HEPARIN SODIUM 2000 UNITS: 1000 INJECTION INTRAVENOUS; SUBCUTANEOUS at 23:53

## 2024-08-27 RX ADMIN — OXYCODONE HYDROCHLORIDE 10 MG: 10 TABLET ORAL at 00:47

## 2024-08-27 RX ADMIN — POTASSIUM CHLORIDE 40 MEQ: 1500 TABLET, EXTENDED RELEASE ORAL at 06:38

## 2024-08-27 RX ADMIN — METOPROLOL TARTRATE 25 MG: 25 TABLET, FILM COATED ORAL at 07:30

## 2024-08-27 RX ADMIN — CHLORHEXIDINE GLUCONATE 0.12% ORAL RINSE 15 ML: 1.2 LIQUID ORAL at 08:46

## 2024-08-27 RX ADMIN — CEFAZOLIN SODIUM 2000 MG: 2 SOLUTION INTRAVENOUS at 05:22

## 2024-08-27 RX ADMIN — METHOCARBAMOL 750 MG: 750 TABLET ORAL at 16:03

## 2024-08-27 RX ADMIN — MAGNESIUM SULFATE HEPTAHYDRATE 2 G: 40 INJECTION, SOLUTION INTRAVENOUS at 09:52

## 2024-08-27 NOTE — ASSESSMENT & PLAN NOTE
Lab Results   Component Value Date    HGBA1C 6.8 (H) 07/05/2024       Recent Labs     08/26/24 2018 08/26/24  2158 08/26/24 2357 08/27/24  0310   POCGLU 151* 139 154* 145*         Blood Sugar Average: Last 72 hrs:  (P) [id number]  Home regimen: metformin  Insulin infusion for better glucose control   Goal blood glucose 140-180  Transition back to [facility] once tolerating PO diet

## 2024-08-27 NOTE — ASSESSMENT & PLAN NOTE
Patient with prior admission with MSSA bacteremia on 7/28 treated with 7 days of antibiotics  Etiology likely 2/2 right foot ulcer  Complicated by cervical discitis  LUCRECIA without evidence of endocarditis    Plan:  ID consulted, appreciate their recommendations  Plan for ancef x 6 weeks  Repeat blood cultures sent given episodes of diaphoresis/chills  8/23 Blood cultures: no growth to date  MRSA swab negative  UA with micro unremarkable for infection   Consider LUCRECIA given poor visualization of valves  Follow-up MRI reads to assess for further osteo  Pending pericardiocentesis today with concern for infectious effusion

## 2024-08-27 NOTE — ASSESSMENT & PLAN NOTE
Weaned to 6 L nasal cannula from [facility]  Appears related to low lung volumes 2/2 pain  Wean O2 as tolerated to maintain Spo2 > 92%  IS, OOB

## 2024-08-27 NOTE — PROGRESS NOTES
Progress Note - Infectious Disease   Augustus Coffman 68 y.o. male MRN: [id number]  Unit/Bed#: PPHP-310-01 Encounter: [id number]      Below plan and management is preliminary and to be discussed with attending with attestation to follow from Dr. Faulkner.      Impression/Plan:  1. Shock - Patient initially being treated at [facility] with IV antibiotics and course was complicated by hypotension in the setting of atrial fibrillation with RVR 8/21 for which he was started on amiodarone and transfer to [facility] for further management under ICU care.   Management of atrial fibrillation per primary team  Cardiology is following for #4 noted below  Has been off pressors since 8/24  MRI T-spine (and lumbar spine) performed yesterday. C5-C6 was commented on; however, without contrast it is difficult to appreciate meningeal enhancement as per discussion with reading room  Will discuss with primary team regarding MRI C-spine with contrast in coming days as patient's renal function recovers  Will need eventual LUCRECIA to rule out perivalvular complications  Patient remains under CC with plans to transition to SD2 care today   Cardiology, Podiatry, Nephrology, and Neurosurgery are following     2. MSSA bacteremia - Initially positive blood cultures for MSSA that have begun to clear. Initial source thought to be due to chronic right foot ulcer but given #4 there is also concern for purulent pericarditis verus endovascular/valvular complication  Most recent blood cx 8/23 with no growth at this time  Continue IV Ancef 2g q6H, can potentially lower dose pending MRI results as noted above  LUCRECIA has been ordered  Trend fever curve/WBC  Follow cultures     3. C5-C6 osteomyelitis/discitis with epidural phlegmon/abscess  Neurosurgery has evaluated this admission and is following  Can consider repeat MRI of cervical as noted above  Follow neuro exam for acute changes  Will likely need prolonged antibiotic duration with outpatient  labs/imaging follow-up     4. Pericardial effusion with potential pericarditis - Found after patient developed chest pain, diaphoresis with ST elevations on EKG, negative troponins, and new pericardial effusion on TTE. No tamponade physiology present. Cardiology has been following and patient remains on colchicine therapy  No plan for pericardial drainage per cardiology notes. Repeat TTE yesterday showed smaller volume of pericardial effusion and patient is currently too high-risk to consider fluid removal at this time due to risk of RV wall injury  Antibiotics as above  Recommend LUCRECIA as noted above. Team will consider pending clinical course and blood culture results  Management per Cardiology team     5. FINA on CKD - Baseline creatinine 0.9-1.1. Nephrology folloiwng this admission, creatinine peaked at 3.3, now plateauing and downtrending  Nephrology continues to follow patient  Avoid contrast and other nephrotoxic agents  Antibiotic dosing to be adjusted as needed for renal impairment     6. Acute transaminitis - Likely in setting of acute infection, hypotension. RUQ US unremarkable. Labs appear to be downtrending.   Continue to follow CMP  Antibiotics as above     7. Chronic right foot ulcer with hardware present - Podiatry has been consulted without plans for acute intervention other than wound care and surgical shoe as ulcer is currently stable and not believed to be current source of bacteremia.      8. Type 2 Diabetes - Last A1c 6/8. Takes metformin at home. Glucose has been relatively controlled throughout this admission, remains on insulin gtt. Management per primary team.      9. Obesity - BMI 34. Continue current abx regimen for now, will adjust as needed.         ID consult service will continue to follow.    Antibiotics:  IV Ancef D8    24 Hour events:  Yesterday and overnight notes reviewed. No acute events overnight.     Subjective:  Patient has no fever, chills, sweats; no nausea, vomiting,  diarrhea; no cough, shortness of breath; no pain. No new symptoms.    Interval History: 67yo male with history of T2DM c/b neuropathy and chronic R foot diabetic foot wound, HTN, CHF, stasis dermatitis of bilateral lower extremities, obesity, CKD, HLD, lymphoma now in remission. Patient had outpatient MRI for chronic neck pain that ultimately showed C5-C6 osteo/discitis with 3mm epidural abscess/phlegmon. Admission blood culture was positive and was treated with short course of IV abx. He was eventually discharged and returned to ER after unchanged cervical pathology with noted elevated creatinine and LFTs as well as positive blood cultures for MSSA. Hospital course c/b chest pain and diaphoresis accompanied by ST elevations and echo that showed pericardial effusion concerning for pericarditis. Course further c/b by afib with RVR, hypotension for which he is under CC management on pressors.        Objective:  Vitals:  Temp:  [97.5 °F (36.4 °C)] 97.5 °F (36.4 °C)  HR:  [] 110  Resp:  [15-35] 22  BP: (106-136)/(55-82) 116/69  SpO2:  [94 %-100 %] 98 %  Temp (24hrs), Av.5 °F (36.4 °C), Min:97.5 °F (36.4 °C), Max:97.5 °F (36.4 °C)  Current: Temperature: 97.5 °F (36.4 °C)    Physical Exam:   General Appearance:  Alert, interactive, nontoxic, no acute distress.   Neck/Throat: Oropharynx moist without lesions. C-collar in place   Lungs:   Clear to auscultation bilaterally; no wheezes, rhonchi or rales; respirations unlabored   Heart:  RRR; no murmur, rub or gallop   Abdomen:   Soft, non-tender, non-distended, positive bowel sounds.     Extremities: No clubbing, cyanosis or edema. Stasis dermatitis of bilateral extremities.    Skin: No new rashes or lesions. No draining wounds noted.       Labs, Imaging, & Other studies:   All pertinent labs and imaging studies in PACS were personally reviewed as below.  Results from last 7 days   Lab Units 24  0500 24  0455 24  0405   WBC Thousand/uL 11.51*  14.22* 17.18*   HEMOGLOBIN g/dL 10.9* 11.2* 10.2*   PLATELETS Thousands/uL 432* 434* 366     Results from last 7 days   Lab Units 08/27/24  0444 08/26/24  0508 08/25/24  0406 08/25/24  0405   POTASSIUM mmol/L 3.2* 4.0   < >  --    CHLORIDE mmol/L 89* 91*   < >  --    CO2 mmol/L 33* 30   < >  --    BUN mg/dL 90* 70*   < >  --    CREATININE mg/dL 2.59* 2.73*   < >  --    EGFR ml/min/1.73sq m 24 23   < >  --    CALCIUM mg/dL 9.3 9.7   < >  --    AST U/L 257* 513*  --  917*   ALT U/L 15 33  --  81*   ALK PHOS U/L 268* 302*  --  188*    < > = values in this interval not displayed.     Results from last 7 days   Lab Units 08/23/24  2153 08/23/24  2055 08/23/24 2011 08/22/24  0606 08/21/24  0103 08/20/24  1432 08/20/24  1430 08/20/24  1417   BLOOD CULTURE   --  No Growth at 72 hrs. No Growth at 72 hrs. No Growth After 5 Days.  No Growth After 5 Days.  --  Staphylococcus aureus* Staphylococcus aureus* Staphylococcus aureus*   GRAM STAIN RESULT   --   --   --   --   --  Gram positive cocci in clusters* Gram positive cocci in clusters* Gram positive cocci in clusters*   URINE CULTURE   --   --   --   --  10,000-19,000 cfu/ml Enterococcus faecalis*  --   --   --    MRSA CULTURE ONLY  No Methicillin Resistant Staphlyococcus aureus (MRSA) isolated  --   --   --   --   --   --   --        Dior Ramon, DO  Internal Medicine Residency, PGY-2

## 2024-08-27 NOTE — PROGRESS NOTES
Cardiology Progress Note - Augustus Coffman 68 y.o. male MRN: [id number]    Unit/Bed#: PPHP-310-01 Encounter: [id number]      Assessment:  Principal Problem:    MSSA bacteremia  Active Problems:    History of hypertension    Diabetic peripheral neuropathy (HCC)    Type 2 diabetes mellitus (HCC)    FINA (acute kidney injury) on CKD stage 2(HCC)    Acute osteomyelitis of cervical spine (HCC)    Transaminitis    New onset a-fib (HCC)    Acute pericarditis    Diabetic ulcer of right foot (HCC)    Acute respiratory insufficiency    Acute neck pain    Constipation    Assessment/Plan:      Acute pericarditis with moderate pericardial effusion  -Given prolonged Staph aureus bacteremia, concern that this may be purulent pericarditis  -TTE on 8/22 showed moderate pericardial effusion without findings of tamponade and mitral valve with hyperechoic thickening at the tips consistent with most likely calcification  --Repeat TTE on 8/24 showed LVEF 45%, again no findings of tamponade  --Thoracic surgery evaluated, no plan for intervention as long as echo continues to show no tamponade physiology  - Repeat TTE today showed LVEF 50%, no findings of tamponade and moderate sized pericardial effusion, stable in size compared to last echo  --Will need eventual LUCRECIA to rule out endocarditis. At this time blood cultures from 8/23 remain no growth at 72 hours, so will hold off on LUCRECIA for now. Will also hold right now pending improvement in hemodynamic instability  --Per ID, recommend getting a pericardiocentesis for fluid cultures/cell counts. However, discussed with interventional cardiology and the risks of performing a pericardiocentesis on this pt outweigh the benefits at this time. Would expect presentation to be more severe if this were truly purulent pericarditis.  -Continue colchicine 0.6 mg BID and prednisone 30 mg daily. Unable to initiate NSAIDs 2/2 FINA     2.  New onset atrial fibrillation with RVR  - Switched from metoprolol  "tartrate to amiodarone on 8/23 due to new hypotension in setting of distributive shock. Resumed metoprolol tartrate 25 mg BID on 8/26 as BP improved and pt remained tachycardic  -- Pt noted to be in atrial flutter today, so loaded with IV amiodarone 150 mg bolus. Recommend keeping on amiodarone gtt 1 mg/min for 24 hours  -- Continue amiodarone 200 mg BID  -- Continue Lopressor 25 mg BID  - Continue heparin drip for anticoagulation  - Continue to monitor heart rate trends on telemetry     3.  MSSA bacteremia with cervical discitis/osteomyelitis  - Further management per infectious disease and primary team     4.  Transaminitis  - Continue to monitor LFTs closely      5.  Hypertension  - BP running soft at this time  -- Continue lopressor 25 mg BID and amiodarone 200 mg BID  - Continue to monitor blood pressures closely      6.  Thoracic aortic aneurysm  - Continue with periodic surveillance     7. Aortic stenosis  - Continue periodic outpatient surveillance           Case discussed and reviewed with Dr. Rayo. Please wait for final recommendations from Dr. Rayo. Thank you for involving us in the care of your patient.      Subjective:   Patient seen and examined.  No significant events overnight. Pt converted to NSR after initiation of metoprolol and was in sinus rhythm from around 5:30 PM yesterday to 7 AM today. Then went back into atrial fibrillation. Was noted around 10 AM to be in atrial flutter.    Objective:     Vitals: Blood pressure 116/69, pulse (!) 110, temperature 97.5 °F (36.4 °C), temperature source Oral, resp. rate 22, height 6' 3\" (1.905 m), weight 113 kg (250 lb 3.6 oz), SpO2 98%., Body mass index is 31.28 kg/m².,   Orthostatic Blood Pressures      Flowsheet Row Most Recent Value   Blood Pressure 116/69 filed at 08/27/2024 0730   Patient Position - Orthostatic VS Sitting filed at 08/27/2024 0730              Intake/Output Summary (Last 24 hours) at 8/27/2024 0823  Last data filed at 8/27/2024 " 0801  Gross per 24 hour   Intake 1019.26 ml   Output 3350 ml   Net -2330.74 ml       On telemetry review, pt noted to be in NSR from ~ 5:30 PM yesterday to 7 AM today. Then went back into atrial fibrillation. Around 10 AM, was in atrial flutter.      Physical Exam:    GEN: Augustus Coffman appears well, alert and oriented x 3, pleasant and cooperative   HEENT: pupils equal, round, and reactive to light; extraocular muscles intact  NECK: supple, no carotid bruits   HEART: irregularly regular rhythm, normal S1 and S2, no murmurs, clicks, gallops or rubs   LUNGS: clear to auscultation bilaterally; no wheezes, rales, or rhonchi   ABDOMEN: normal bowel sounds, soft, no tenderness, no distention  EXTREMITIES: peripheral pulses normal; no clubbing, cyanosis, or edema  NEURO: no focal findings   SKIN: normal without suspicious lesions on exposed skin    Medications:      Current Facility-Administered Medications:     acetaminophen (TYLENOL) tablet 975 mg, 975 mg, Oral, Q6H SRIDHAR, Ophelia Leon PA-C, 975 mg at 08/27/24 0312    amiodarone tablet 200 mg, 200 mg, Oral, BID With Meals, Ophelia Leon PA-C, 200 mg at 08/27/24 0638    calcium carbonate (TUMS) chewable tablet 1,000 mg, 1,000 mg, Oral, Daily PRN, Breonna Santana PA-C    ceFAZolin (ANCEF) IVPB (premix in dextrose) 2,000 mg 50 mL, 2,000 mg, Intravenous, Q6H, Analisa Faulkner MD, Last Rate: 100 mL/hr at 08/27/24 0522, 2,000 mg at 08/27/24 0522    chlorhexidine (PERIDEX) 0.12 % oral rinse 15 mL, 15 mL, Mouth/Throat, Q12H SRIDHAR, Breonna Santana PA-C, 15 mL at 08/26/24 0901    colchicine (COLCRYS) tablet 0.6 mg, 0.6 mg, Oral, BID, Breonna Santana PA-C, 0.6 mg at 08/26/24 1839    HYDROmorphone (DILAUDID) injection 0.5 mg, 0.5 mg, Intravenous, Q3H PRN, Breonna Santana PA-C, 0.5 mg at 08/27/24 0111    insulin regular (HumuLIN R,NovoLIN R) 1 Units/mL in sodium chloride 0.9 % 100 mL infusion, 0.3-21 Units/hr, Intravenous, Titrated, Ophelia Leon PA-C, Last Rate: 2 mL/hr at  08/27/24 0801, 2 Units/hr at 08/27/24 0801    methocarbamol (ROBAXIN) tablet 750 mg, 750 mg, Oral, Q6H SRIDHAR, Breonna Santana PA-C, 750 mg at 08/27/24 0312    metoprolol tartrate (LOPRESSOR) tablet 25 mg, 25 mg, Oral, Q12H SRIDHAR, TANVIR Go, 25 mg at 08/27/24 0730    ondansetron (ZOFRAN) injection 4 mg, 4 mg, Intravenous, Q6H PRN, Breonna Santana PA-C    oxyCODONE (ROXICODONE) immediate release tablet 10 mg, 10 mg, Oral, Q4H PRN, Breonna Santana PA-C, 10 mg at 08/27/24 0047    oxyCODONE (ROXICODONE) IR tablet 5 mg, 5 mg, Oral, Q4H PRN, Breonna Santana PA-C    polyethylene glycol (MIRALAX) packet 17 g, 17 g, Oral, Daily, Amy Franco PA-C, 17 g at 08/26/24 0901    polyethylene glycol (MIRALAX) packet 17 g, 17 g, Oral, Once, Amy Franco PA-C    potassium chloride 20 mEq IVPB (premix), 20 mEq, Intravenous, Once, TANVIR oG, Last Rate: 50 mL/hr at 08/27/24 0638, 20 mEq at 08/27/24 0638    predniSONE tablet 30 mg, 30 mg, Oral, Daily, Breonna Santana PA-C, 30 mg at 08/26/24 0902    senna-docusate sodium (SENOKOT S) 8.6-50 mg per tablet 2 tablet, 2 tablet, Oral, BID, Amy Franco PA-C     Labs & Results:    Results from last 7 days   Lab Units 08/24/24  0201   CK TOTAL U/L 22*     Results from last 7 days   Lab Units 08/27/24  0500 08/26/24  0455 08/25/24  0405   WBC Thousand/uL 11.51* 14.22* 17.18*   HEMOGLOBIN g/dL 10.9* 11.2* 10.2*   HEMATOCRIT % 34.6* 36.9 33.4*   PLATELETS Thousands/uL 432* 434* 366         Results from last 7 days   Lab Units 08/27/24  0444 08/26/24  0508 08/25/24  1629 08/25/24  0406 08/25/24  0405   POTASSIUM mmol/L 3.2* 4.0 4.3   < >  --    CHLORIDE mmol/L 89* 91* 91*   < >  --    CO2 mmol/L 33* 30 28   < >  --    BUN mg/dL 90* 70* 66*   < >  --    CREATININE mg/dL 2.59* 2.73* 3.05*   < >  --    CALCIUM mg/dL 9.3 9.7 9.4   < >  --    ALK PHOS U/L 268* 302*  --   --  188*   ALT U/L 15 33  --   --  81*   AST U/L 257* 513*  --   --  917*    < > = values  in this interval not displayed.     Results from last 7 days   Lab Units 08/26/24  2019 08/26/24  1342 08/26/24  0453 08/25/24  0405 08/22/24  1552 08/22/24  0926   INR   --   --   --  1.56*  --  1.21*   PTT seconds 68* 69* 59* 61*   < > 40*    < > = values in this interval not displayed.     Results from last 7 days   Lab Units 08/27/24  0444 08/26/24  0508 08/25/24  0405   MAGNESIUM mg/dL 2.1 2.3 2.2

## 2024-08-27 NOTE — ASSESSMENT & PLAN NOTE
Hx HTN on norvasc, HCTZ, losartan, Toprol-XL-all initially on hold secondary to hypotension  Restarted on Metoprolol 25 mg PO BID  Statin on hold 2/2 transaminitis  Aspirin on hold - discuss restarting

## 2024-08-27 NOTE — RESPIRATORY THERAPY NOTE
Resp note   08/26/24 2015   Respiratory Assessment   Assessment Type Assess only   General Appearance Awake   Respiratory Pattern Normal   Chest Assessment Chest expansion symmetrical   Bilateral Breath Sounds Clear;Diminished   Cough None   Resp Comments Pt transitioned to 6L NC from [facility]. Pt tolerating well, will d/c MFNC at this time.   O2 Device NC   Oxygen Therapy/Pulse Ox   O2 Device Nasal cannula   O2 Therapy Oxygen humidified   Nasal Cannula O2 Flow Rate (L/min) 6 L/min   Calculated FIO2 (%) - Nasal Cannula 44   SpO2 98 %   SpO2 Activity At Rest   $ Pulse Oximetry Spot Check Charge Completed

## 2024-08-27 NOTE — ASSESSMENT & PLAN NOTE
Developed new onset afib 8/21/8/22  Initiated on heparin infusion and metoprolol  Unable to tolerate metoprolol 2/2 hypotension  Received amiodarone bolus and initiated on infusion    Plan:  Amiodarone 200 mg BID  Metoprolol 25 mg BID -can uptitrate as BP tolerates this  Continue heparin drip

## 2024-08-27 NOTE — RESTORATIVE TECHNICIAN NOTE
Restorative Technician Note      Patient Name: Augustus IRENA Coffman     Restorative Tech Visit Date: 08/27/24  Note Type: Mobility    Unable to assist the patient OOB at this time due to medical reasons.

## 2024-08-27 NOTE — PROGRESS NOTES
[facility]  Progress Note  Name: Augustus Coffman I  MRN: [id number]  Unit/Bed#: PPHP-310-01 I Date of Admission: 8/23/2024   Date of Service: 8/27/2024 I Hospital Day: 4    Assessment & Plan   * MSSA bacteremia  Assessment & Plan  Patient with prior admission with MSSA bacteremia on 7/28 treated with 7 days of antibiotics  Etiology likely 2/2 right foot ulcer  Complicated by cervical discitis  LUCRECIA without evidence of endocarditis    Plan:  ID consulted, appreciate their recommendations  Plan for ancef x 6 weeks  Repeat blood cultures sent given episodes of diaphoresis/chills  8/23 Blood cultures: no growth to date  MRSA swab negative  UA with micro unremarkable for infection   Consider LUCRECIA given poor visualization of valves  Follow-up MRI reads to assess for further osteo  Pending pericardiocentesis today with concern for infectious effusion    Hypotension-resolved as of 8/25/2024  Assessment & Plan  Developed new onset hypotension in the setting of afib with RVR  Weaned off levophed/vasopressin 8/24    New onset a-fib (HCC)  Assessment & Plan  Developed new onset afib 8/21/8/22  Initiated on heparin infusion and metoprolol  Unable to tolerate metoprolol 2/2 hypotension  Received amiodarone bolus and initiated on infusion    Plan:  Amiodarone 200 mg BID  Metoprolol 25 mg BID -can uptitrate as BP tolerates this  Continue heparin drip     Acute pericarditis  Assessment & Plan  8/22 Echo: Limited echo for assessment of endocarditis.  The patient's aortic valve is difficult to assess due to calcification but there is no new significant regurgitation.  The mitral valve has hyperechoic thickening at the tips consistent with most likely calcification, these were seen on the echocardiograms in July.  The tricuspid valve similarly was not well-visualized but no new regurgitation.  The pulmonary valve was not well-visualized. Off note patient has new moderate pericardial effusion  without specific echocardiographic signs of tamponade  Patient with symptoms consistent with pericarditis  Plan to repeat echo in the next 24-48 hours    Plan:  Cardiology consulted  Colchicine 0.6 mg BID  Unable to initiate NSAIDs 2/2 FINA  Prednisone 30 mg daily  Close hemodynamic monitoring    Acute respiratory insufficiency  Assessment & Plan  Weaned to 6 L nasal cannula from [facility]  Appears related to low lung volumes 2/2 pain  Wean O2 as tolerated to maintain Spo2 > 92%  IS, OOB    FINA (acute kidney injury) on CKD stage 2([facility])  Assessment & Plan  Baseline Cr 1-1.2  Developed FINA 8/23 with associated hyperkalemia    Plan:  Nephrology following, appreciate their recommendations  Maintain avilez  Avoid nephrotoxins  Strict I/Os  Trend renal function  Continue diuresis - can likely decrease to daily dosing/p.o. dosing today    Acute neck pain  Assessment & Plan  Tylenol 975 mg PO q6h  Oxycodone 5 mg/10 mg q4h PRN   Robaxin 750 mg every 6 hrs  Dilaudid 0.5 mg IV q3h PRN for breakthrough pain    Acute osteomyelitis of cervical spine ([facility])  Assessment & Plan  8/20 MRI C-Spine: Imaging findings suspicious for discitis osteomyelitis at C5-C6. 3 mm epidural phlegmon/small abscess is also suspected. There is moderate resultant central stenosis and mild mass effect on the cord  Neurosurgery consulted   Upright xrays completed  Recommending wearing of C-collar which is currently in place  Recommending 6 weeks IV antibiotics and follow-up repeat imaging  Q4h neuro checks    Hyperkalemia-resolved as of 8/27/2024  Assessment & Plan  Developed FINA with hyperkalemia  Received medical therapy with insulin, calcium, bicarb, lasix, lokelma  Trending down currently with diuresis    Diabetic ulcer of right foot ([facility])  Assessment & Plan  Follows as an outpatient with wound care  Likely source of MSSA bacteremia   Continue local wound care  Podiatry consulted  WBAT    Type 2 diabetes mellitus (HCC)  Assessment & Plan  Lab Results    Component Value Date    HGBA1C 6.8 (H) 07/05/2024       Recent Labs     08/26/24 2018 08/26/24 2158 08/26/24 2357 08/27/24  0310   POCGLU 151* 139 154* 145*         Blood Sugar Average: Last 72 hrs:  (P) 156.3247473857109586  Home regimen: metformin  Insulin infusion for better glucose control   Goal blood glucose 140-180  Transition back to SSI once tolerating PO diet    History of hypertension  Assessment & Plan  Hx HTN on norvasc, HCTZ, losartan, Toprol-XL-all initially on hold secondary to hypotension  Restarted on Metoprolol 25 mg PO BID  Statin on hold 2/2 transaminitis  Aspirin on hold - discuss restarting    Diabetic peripheral neuropathy (HCC)  Assessment & Plan  Lab Results   Component Value Date    HGBA1C 6.8 (H) 07/05/2024       Recent Labs     08/26/24 2018 08/26/24 2158 08/26/24 2357 08/27/24  0310   POCGLU 151* 139 154* 145*         Blood Sugar Average: Last 72 hrs:  (P) [id number]      Constipation  Assessment & Plan  Last BM Date: 08/20/24  Bowel regimen: Colace twice daily, senna daily, MiraLAX daily  Increase senna to twice daily  Give second dose of MiraLAX in afternoon tomorrow    Transaminitis  Assessment & Plan  Likely 2/2 poor perfusion/hypotension  Hold statin  Trend             Disposition: Stepdown Level 2    ICU Core Measures     A: Assess, Prevent, and Manage Pain Has pain been assessed? Yes  Need for changes to pain regimen? No   B: Both SAT/SAT  N/A   C: Choice of Sedation RASS Goal: N/A patient not on sedation  Need for changes to sedation or analgesia regimen? NA   D: Delirium CAM-ICU: Negative   E: Early Mobility  Plan for early mobility? Yes   F: Family Engagement Plan for family engagement today? Yes       Antibiotic Review: Patient on appropriate coverage based on culture data.  and Infectious disease consulted    Review of Invasive Devices:    [place]illa Plan: Continue for accurate I/O monitoring for 48 hours  Central access plan: Will obtain peripheral access and  discontinue prior to transfer  Sonia Plan: Discontinue arterial line    Prophylaxis:  VTE VTE covered by:  heparin (porcine), Intravenous, 2,000 Units at 08/26/24 0745  heparin (porcine), Intravenous       Stress Ulcer  not ordered         Significant 24hr Events     24hr events: A-fib with RVR during day, received amiodarone bolus and metoprolol, converted back to sinus rhythm with p.o. metoprolol.  Needed normal sinus rhythm overnight.  Completed MRIs overnight.  Plan for pericardiocentesis today with cardiology.    Complaining of persistent neck pain but reports his improved with ordered medication regimen.  Reports poor appetite.     Subjective   Review of Systems: See HPI for Review of Systems     Objective                            Vitals I/O      Most Recent Min/Max in 24hrs   Temp 97.7 °F (36.5 °C) Temp  Min: 97.7 °F (36.5 °C)  Max: 97.9 °F (36.6 °C)   Pulse 74 Pulse  Min: 71  Max: 132   Resp 17 Resp  Min: 15  Max: 35   /82 BP  Min: 103/64  Max: 136/82   O2 Sat 100 % SpO2  Min: 94 %  Max: 100 %      Intake/Output Summary (Last 24 hours) at 8/27/2024 0401  Last data filed at 8/27/2024 0321  Gross per 24 hour   Intake 1272.23 ml   Output 5150 ml   Net -3877.77 ml       Diet NPO; Sips with meds    Invasive Monitoring   Arterial Line  [place] /49  Arterial Line BP  Min: 94/58  Max: 127/53   MAP 69 mmHg  Arterial Line MAP (mmHg)  Min: 58 mmHg  Max: 83 mmHg           Physical Exam   Physical Exam  Vitals reviewed.   Eyes:      Pupils: Pupils are equal, round, and reactive to light.   Skin:     General: Skin is warm and dry.   HENT:      Head: Normocephalic and atraumatic.   Cardiovascular:      Rate and Rhythm: Normal rate and regular rhythm.      Heart sounds: Normal heart sounds.   Musculoskeletal:      Right lower leg: Edema present.      Left lower leg: Edema present.   Abdominal: General: There is no distension.      Palpations: Abdomen is soft.      Tenderness: There is no abdominal tenderness.    Constitutional:       General: He is awake. He is not in acute distress.     Appearance: He is not toxic-appearing.      Interventions: Cervical collar and nasal cannula in place.   Pulmonary:      Effort: Pulmonary effort is normal.      Breath sounds: Normal breath sounds. No wheezing or rhonchi.   Neurological:      General: No focal deficit present.      Mental Status: He is alert and oriented to person, place, and time.      Sensory: Sensation is intact.   Genitourinary/Anorectal:  Villa present.          Diagnostic Studies      Imaging: MRI T and L-spine pending reads      Medications:  Scheduled PRN   acetaminophen, 975 mg, Q6H SRIDHAR  amiodarone, 200 mg, BID With Meals  bumetanide, 1 mg, BID  cefazolin, 2,000 mg, Q6H  chlorhexidine, 15 mL, Q12H SRIDHAR  colchicine, 0.6 mg, BID  methocarbamol, 750 mg, Q6H SRIDHAR  metoprolol tartrate, 25 mg, Q12H SRIDHAR  polyethylene glycol, 17 g, Daily  polyethylene glycol, 17 g, Once  predniSONE, 30 mg, Daily  senna-docusate sodium, 2 tablet, BID      calcium carbonate, 1,000 mg, Daily PRN  heparin (porcine), 2,000 Units, Q6H PRN  heparin (porcine), 4,000 Units, Q6H PRN  HYDROmorphone, 0.5 mg, Q3H PRN  ondansetron, 4 mg, Q6H PRN  oxyCODONE, 10 mg, Q4H PRN  oxyCODONE, 5 mg, Q4H PRN       Continuous    insulin regular (HumuLIN R,NovoLIN R) 1 Units/mL in sodium chloride 0.9 % 100 mL infusion, 0.3-21 Units/hr, Last Rate: 4 Units/hr (08/27/24 0311)         Labs:    CBC    Recent Labs     08/25/24  0405 08/26/24  0455   WBC 17.18* 14.22*   HGB 10.2* 11.2*   HCT 33.4* 36.9    434*     BMP    Recent Labs     08/25/24  1629 08/26/24  0508   SODIUM 134* 139   K 4.3 4.0   CL 91* 91*   CO2 28 30   AGAP 15* 18*   BUN 66* 70*   CREATININE 3.05* 2.73*   CALCIUM 9.4 9.7       Coags    Recent Labs     08/25/24  0405 08/26/24  0453 08/26/24  1342 08/26/24 2019   INR 1.56*  --   --   --    PTT 61*   < > 69* 68*    < > = values in this interval not displayed.        Additional Electrolytes  Recent  Labs     08/25/24  0405 08/26/24  0508   MG 2.2 2.3   PHOS 7.1*  --    CAIONIZED 1.12  --           Blood Gas    No recent results  Recent Labs     08/25/24  0405   PHVEN 7.275*   LYZ4YEC 59.9*   PO2VEN 46.6*   EYT7RHC 27.2   BEVEN -0.5   K0HDHBI 73.1    LFTs  Recent Labs     08/25/24  0405 08/26/24  0508   ALT 81* 33   * 513*   ALKPHOS 188* 302*   ALB 3.2* 3.3*   TBILI 0.45 0.45       Infectious  No recent results  Glucose  Recent Labs     08/25/24  0406 08/25/24  1009 08/25/24  1629 08/26/24  0508   GLUC 112 146* 138 124               Amy Franco PA-C

## 2024-08-27 NOTE — PROGRESS NOTES
Follow up Consultation    Nephrology   Augustus IRENA Coffman 68 y.o. male MRN: [id number]  Unit/Bed#: PPHP-310-01 Encounter: [id number]      Physician Requesting Consult: Lokesh Paredes DO        ASSESSMENT:  68-year-old male with multiple comorbidities including diabetes, follicular lymphoma and recent MSSA bacteremia presented with abnormal MRI cervical spine as an outpatient.  Nephrology following for acute kidney injury.      Acute kidney injury :   FINA most likely secondary to ischemic injury secondary to ischemic injury secondary to hemodynamic perturbations plus new onset A-fib plus septic shock with subsequent ATN.  After review of records it appears that the patient has a baseline Creatinine of 0.9-1.1 mg/dL.  Admission creatinine of 1.36 mg/dL on 8/20/2024.  Creatinine today is at 1.83 mg/dL, stable and improving.  Dialysis consent in chart in case needed per my prior colleague  Monitor for dialysis needs  Hold diuretics for now if worsening volume status may give as needed Bumex  check BMP, magnesium, phosphorus in a.m.  Await renal recovery.  Place on a renal diet when allowed diet order.   Strict I/O.  Daily weights  Urinary retention protocol  Avoid nephrotoxins, adjust meds to appropriate GFR.    H&H/anemia:  most recent hemoglobin at 11.4 g/dL  Maintain hemoglobin greater than 8 grams/deciliter    Acid-base electrolytes:  Hyponatremia: Restrict 1.2 L/day.  Most recent sodium stable 138 mill equivalents.  Resolved  Hyperkalemia: Most recent potassium 3.5 mEq stable resolved.  On K-Janiya 40 mill equivalents cautious supplementation as diuretics now on hold    Blood pressure/A-fib/volume overload:   Optimize hemodynamic status to avoid delay in renal recovery.  Maintain MAP > 65mmHg  Avoid BP fluctuations.  Home medications:  Current medications: Bumex 2 mg p.o. daily, metoprolol 25 mg p.o. every 12  Hold diuretics for now may give as needed Bumex if worsening volume status or decreased urine output    Other  "medical problems:  MSSA bacteremia:  Management per primary team.  In the setting of right foot ulcer concern for cervical discitis cervical osteomyelitis with paraspinal phlegmon/abscess.  On Ancef for total of 6 weeks  Pericardial effusion: Management per primary team follow-up with cardiology colchicine and prednisone discontinued   A-fib with RVR: Follow-up with cardiology, on amiodarone and heparin    Plan below is what was discussed with the primary team that they agree with:  check BMP, magnesium, phosphorus in a.m.  Hold Bumex for now may give as needed diuretics if worsening volume status.  Cautious potassium supplementation  No acute indication for dialysis at this time      Thanks for the consult  Will continue to follow  Please call with questions/ concerns.      Yokasta Flores MD, FASN, 2024, 11:58 AM                Objective :   Patient seen and examined in his room no overnight events hemodynamic stable remains afebrile urine output 4 L happy renal parameters continue to improve thankful no need for dialysis.      PHYSICAL EXAM  /66   Pulse 74   Temp 97.7 °F (36.5 °C) (Oral)   Resp 16   Ht 6' 3\" (1.905 m)   Wt 115 kg (253 lb 8.5 oz)   SpO2 98%   BMI 31.69 kg/m²   Temp (24hrs), Av.5 °F (36.4 °C), Min:97.2 °F (36.2 °C), Max:97.7 °F (36.5 °C)        Intake/Output Summary (Last 24 hours) at 2024 1158  Last data filed at 2024 1000  Gross per 24 hour   Intake 2366.38 ml   Output 4090 ml   Net -1723.62 ml       I/O last 24 hours:  In: 2471.4 [P.O.:1118; I.V.:853.4; IV Piggyback:500]  Out: 4440 [Urine:4440]      Current Weight: Weight - Scale: 115 kg (253 lb 8.5 oz)  First Weight: Weight - Scale: 121 kg (267 lb 13.7 oz)  Physical Exam  Vitals and nursing note reviewed.   Constitutional:       General: He is not in acute distress.     Appearance: Normal appearance. He is obese. He is not ill-appearing, toxic-appearing or diaphoretic.   HENT:      Head: Normocephalic and " atraumatic.      Mouth/Throat:      Pharynx: No oropharyngeal exudate.   Eyes:      General: No scleral icterus.  Neck:      Comments: Neck collar in place  Cardiovascular:      Rate and Rhythm: Normal rate.   Pulmonary:      Effort: No respiratory distress.      Breath sounds: No stridor. No wheezing.   Abdominal:      General: There is no distension.      Palpations: Abdomen is soft.      Tenderness: There is no abdominal tenderness.   Musculoskeletal:         General: No swelling.      Cervical back: Rigidity present.   Skin:     General: Skin is dry.      Coloration: Skin is not jaundiced.   Neurological:      General: No focal deficit present.      Mental Status: He is alert and oriented to person, place, and time. Mental status is at baseline.   Psychiatric:         Mood and Affect: Mood normal.         Behavior: Behavior normal.             Review of Systems   Constitutional:  Negative for chills and fatigue.   HENT:  Negative for congestion.    Respiratory:  Negative for shortness of breath.    Cardiovascular:  Negative for leg swelling.   Gastrointestinal:  Negative for abdominal pain.   Genitourinary:  Negative for decreased urine volume.   Musculoskeletal:  Negative for back pain.   Neurological:  Negative for headaches.   Psychiatric/Behavioral:  Negative for agitation.    All other systems reviewed and are negative.      Scheduled Meds:  Current Facility-Administered Medications   Medication Dose Route Frequency Provider Last Rate    acetaminophen  975 mg Oral Q6H SRIDHAR Ophelia Leon PA-C      amiodarone (CORDARONE) 900 mg in dextrose 5 % 500 mL infusion  0.5 mg/min Intravenous Continuous TANVIR Go 0.5 mg/min (08/27/24 1931)    amiodarone  200 mg Oral BID With Meals Ophelia Leon PA-C      apixaban  5 mg Oral BID Ophelia Leon PA-C      calcium carbonate  1,000 mg Oral Daily PRN Breonna Santana PA-C      cefazolin  2,000 mg Intravenous Q6H Analisa Faulkner MD 2,000 mg (08/28/24 0606)     chlorhexidine  15 mL Mouth/Throat Q12H [facility] Breonna Santana PA-C      HYDROmorphone  0.5 mg Intravenous Q3H PRN Breonna Santana PA-C      insulin lispro  1-5 Units Subcutaneous TID AC Ophelia Leon PA-C      insulin lispro  1-5 Units Subcutaneous HS Ophelia Leon PA-C      methocarbamol  750 mg Oral Q6H [facility] Breonna Santana PA-C      metoprolol tartrate  25 mg Oral Q12H [facility] TANVIR Go      ondansetron  4 mg Intravenous Q6H PRN Breonna Santana PA-C      oxyCODONE  10 mg Oral Q4H PRN Breonna Santana PA-C      oxyCODONE  5 mg Oral Q4H PRN Breonna Santana PA-C      polyethylene glycol  17 g Oral Daily Amy Franco PA-C      potassium chloride  40 mEq Oral Daily TANVIR Go      [START ON 8/29/2024] predniSONE  10 mg Oral Daily TANVIR Go      senna-docusate sodium  2 tablet Oral BID Amy Franco PA-C         PRN Meds:.  calcium carbonate    HYDROmorphone    ondansetron    oxyCODONE    oxyCODONE    Continuous Infusions:amiodarone (CORDARONE) 900 mg in dextrose 5 % 500 mL infusion, 0.5 mg/min, Last Rate: 0.5 mg/min (08/27/24 1931)          Invasive Devices:     Invasive Devices       Central Venous Catheter Line  Duration             CVC Central Lines 08/23/24 Triple 20cm 4 days              Drain  Duration             Urethral Catheter 5 days                      LABORATORY:    Results from last 7 days   Lab Units 08/28/24  0500 08/28/24  0000 08/27/24  1340 08/27/24  0500 08/27/24  0444 08/26/24  0508 08/26/24  0455 08/25/24  1629 08/25/24  1009 08/25/24  0406 08/25/24  0405 08/24/24  2203 08/24/24  1551 08/24/24  1003 08/24/24  0620 08/24/24  0403 08/24/24  0201 08/23/24  2155 08/23/24  1939 08/23/24  0906 08/23/24  0632   WBC Thousand/uL 12.78*  --   --  11.51*  --   --  14.22*  --   --   --  17.18*  --   --   --   --  22.20*  --   --  28.03*  --  25.32*   HEMOGLOBIN g/dL 11.4*  --   --  10.9*  --   --  11.2*  --   --   --  10.2*  --   --   --   --  10.4*   --   --  10.5*  --  11.2*   HEMATOCRIT % 37.6  --   --  34.6*  --   --  36.9  --   --   --  33.4*  --   --   --   --  34.2*  --   --  34.6*  --  36.3*   PLATELETS Thousands/uL 450*  --   --  432*  --   --  434*  --   --   --  366  --   --   --   --  383  --   --  430*  --  356   POTASSIUM mmol/L  --  3.5 4.0  --  3.2* 4.0  --  4.3 4.5 4.8  --    < > 5.3 5.5*   < >  --  6.0* 6.0* 5.7*   < >  --    CHLORIDE mmol/L  --  93* 91*  --  89* 91*  --  91* 93* 94*  --    < > 94* 96   < >  --  93* 93* 92*   < >  --    CO2 mmol/L  --  32 32  --  33* 30  --  28 27 27  --    < > 24 22   < >  --  23 22 25   < >  --    BUN mg/dL  --  87* 90*  --  90* 70*  --  66* 63* 59*  --    < > 55* 54*   < >  --  52* 49* 46*   < >  --    CREATININE mg/dL  --  1.83* 2.24*  --  2.59* 2.73*  --  3.05* 3.13* 3.23*  --    < > 3.33* 3.06*   < >  --  3.04* 3.02* 2.92*   < >  --    CALCIUM mg/dL  --  9.5 9.4  --  9.3 9.7  --  9.4 9.3 9.3  --    < > 9.4 8.8   < >  --  9.4 9.4 10.0   < >  --    MAGNESIUM mg/dL  --  2.2  --   --  2.1 2.3  --   --   --   --  2.2  --  2.5 2.3  --   --  2.4 2.5 2.4   < >  --    PHOSPHORUS mg/dL  --  2.8  --   --  4.0  --   --   --   --   --  7.1*  --  7.7* 7.4*  --   --  8.1* 7.7* 7.2*   < >  --     < > = values in this interval not displayed.      rest all reviewed    RADIOLOGY:  MRI lumbar spine wo contrast   Final Result by Duc Justice MD (08/27 0748)      No discitis osteomyelitis involving the thoracic spine. Minimal thoracic spondylosis, as described above.      No discitis osteomyelitis involving the lumbar spine.      Multilevel lumbar spondylosis, as described above, contributing to at most moderate canal stenosis, right greater than left lateral recess stenosis, moderate to severe right and mild left neural foraminal stenosis at L4-L5.      Discitis osteomyelitis again demonstrated at C5-C6, on the localizer/sagittal vertebral counting images.      Right pleural effusion.      Workstation performed: [id number]          MRI thoracic spine wo contrast   Final Result by Duc Justice MD (08/27 3017)      No discitis osteomyelitis involving the thoracic spine. Minimal thoracic spondylosis, as described above.      No discitis osteomyelitis involving the lumbar spine.      Multilevel lumbar spondylosis, as described above, contributing to at most moderate canal stenosis, right greater than left lateral recess stenosis, moderate to severe right and mild left neural foraminal stenosis at L4-L5.      Discitis osteomyelitis again demonstrated at C5-C6, on the localizer/sagittal vertebral counting images.      Right pleural effusion.      Workstation performed: [id number]         CT chest abdomen pelvis wo contrast   Final Result by Oneil Desai MD (08/26 4065)   1.  New moderate nonspecific pericardial effusion and small bilateral pleural effusions. No other definitive manifestations of acute infection-allowing for the limitations of noncontrast CT   2.  Complex exophytic right renal cystic lesion, incompletely assessed by noncontrast CT. Nonemergent follow-up gadolinium enhanced MRI of the abdomen is recommended for further evaluation.      3.  Please refer to the report body for description of other incidental chronic and/or benign findings.               Workstation performed: [id number]         XR spine cervical 2 or 3 vw injury   Final Result by Bill Ferreira MD (08/26 0722)      Destruction of the C5 to space with collapse of the superior endplate of C6 consistent with discitis osteomyelitis which has progressed from the prior studies.      Increased soft tissue swelling anterior to C5-6 level.      Left central venous catheter terminating at the confluence of the innominate veins.         Workstation performed: [id number]         XR spine cervical 2 or 3 vw injury    (Results Pending)   IR tunneled central line placement    (Results Pending)     Rest all reviewed    Portions of the record may have been created with  "voice recognition software. Occasional wrong word or \"sound a like\" substitutions may have occurred due to the inherent limitations of voice recognition software. Read the chart carefully and recognize, using context, where substitutions have occurred.If you have any questions, please contact the dictating provider.    "

## 2024-08-27 NOTE — CONSULTS
PHYSICAL MEDICINE AND REHABILITATION CONSULT NOTE  Augustus Coffman 68 y.o. male MRN: [id number]  Unit/Bed#: PPHP-310-01 Encounter: [id number]    Requested by (Physician/Service): Sylvain Torres DO  Reason for Consultation:  Assessment of rehabilitation needs    Assessment:  Rehabilitation Diagnosis:   C5-C6 osteomyelitis/discitis with epidural phlegmon/abscess   MSSA bacteremia   Shock with hypotension   Pericardial effusion   Impaired mobility and self care    Recommendations:  Rehabilitation Plan:  Continue PT/OT (SLP) while on acute care.  The patient may be a candidate for acute inpatient rehabilitation once medically stable and if tolerance improves. Currently he is requiring amiodarone infusion, heparin drip and has been requiring 6 liters of oxygen via mid flow canula.         Medical Co-morbidities Plan:  New onset atrial fibrillation   Acute pericarditis  Acute pain  Hypotension   FINA on CKD  Acute osteomyelitis of rthe cervical spine   Diabetic right foot ulcer  Diabetes type 2  Diabetic peripheral neuropathy   Transaminitis   Bowel plan: LBM 8/20/2024  Bladder plan: avilez   DVT ppx: heparin drip     Thank you for this consultation.  Do not hesitate to contact service with further questions.      TANVIR Ghosh  PM&R    I have spent a total time of 30 minutes on 08/27/24 in caring for this patient including Patient and family education, Counseling / Coordination of care, Documenting in the medical record, Reviewing / ordering tests, medicine, procedures  , Obtaining or reviewing history  , and Communicating with other healthcare professionals .        History of Present Illness:  Augustus Coffman is a 68 y.o. male with a PMH of  diabetes, diabetic foot wound, HTN who presented to the [facility] on 8/20/2024  after outpatient MRI showed osteomyelitis/diskitis C5/C6. He had a 6 to 6 week history of neck pain with no associated weakness. He was recently admitted for MSSA  "bacteremia and was started on IV ancef. He developed new onset chest pain and atrial fibrillation as well as hypotension. EKG showed evidence of pericarditis and ECHO showed pericardial effusion without evidence of tamponade. He developed Angus and hyperkalemia. He was started on amiodarone for atrial fibrillation and was transferred to [place] ICU. Repeat TTEs showed no evidence of tamponade and recommendation was no plan for intervention as long as ECHO continues to showed no tamponade. ID recommended pericardiocentesis for fluid cultures/cell counts however it was felt risks outweighed benefits. He was placed on colchicine and prednisone. He continues on amiodarone, lopressor and heparin drip. He may require MRI c-spine with contrast once in renal recovery. Podiatry was consulted for chronic right foot ulcer which was not felt to be source of bacteremia and was stable. He was placed on a surgical shoe and is currently WBAT. PM&R are consulted for rehabilitation recommendations.    The patient was seen in his room. He denies any SOB, lightheadedness, dizziness. He denies any falls at home and did not use a cane or walker. He reports passing gas. He has a history of neuropathy that goes to about his ankles.       Review of Systems: 10 point ROS negative except for what is noted in HPI    Function:  Prior level of function and living situation: The patient lives in a split level home with 10 LARY and 10 steps to the 2nd floor. He was independent and working full time, family has a [employer].       Current level of function:  Physical Therapy: Moderate assist for transfers, ambulation not appropriate.   Occupational Therapy: Supervision for eating, minimal assist for grooming, UB dressing, moderate assist for UB bathing, maximal assist for LB bathing/dressing and toileting     Physical Exam:  BP 90/50   Pulse (!) 124   Temp 97.5 °F (36.4 °C) (Oral)   Resp 16   Ht 6' 3\" (1.905 m)   Wt 113 kg (250 lb)   SpO2 " 96%   BMI 31.25 kg/m²        Intake/Output Summary (Last 24 hours) at 8/27/2024 1456  Last data filed at 8/27/2024 1233  Gross per 24 hour   Intake 1949.13 ml   Output 3500 ml   Net -1550.87 ml       Body mass index is 31.25 kg/m².      Physical Exam   Gen: No acute distress, ill-appearing.  HEENT:  Normocephalic/Atraumatic  Extremities: No edema  Pulmonary: Non-labored breathing. Oxygen via nasal cannula.   GI: Non-distended.   MSK: ROM is WFL in all extremities  Integumentary: PVD discoloration.  Neuro: AAOx3, speech is fluent, soft spoken.  Patient is following commands.  Decreased sensation to touch distal LE bilaterally.   Psych: Normal mood and affect.       Social History:    Social History     Socioeconomic History    Marital status: /Civil Union     Spouse name: Not on file    Number of children: Not on file    Years of education: Not on file    Highest education level: Not on file   Occupational History    Not on file   Tobacco Use    Smoking status: Never    Smokeless tobacco: Never    Tobacco comments:     Never smoked but exposed to second hand smoke from birth until 1980's   Vaping Use    Vaping status: Never Used   Substance and Sexual Activity    Alcohol use: Never    Drug use: Never    Sexual activity: Not Currently     Partners: Female     Birth control/protection: Abstinence   Other Topics Concern    Not on file   Social History Narrative    Not on file     Social Determinants of Health     Financial Resource Strain: Not on file   Food Insecurity: No Food Insecurity (8/21/2024)    Hunger Vital Sign     Worried About Running Out of Food in the Last Year: Never true     Ran Out of Food in the Last Year: Never true   Transportation Needs: No Transportation Needs (8/21/2024)    PRAPARE - Transportation     Lack of Transportation (Medical): No     Lack of Transportation (Non-Medical): No   Physical Activity: Not on file   Stress: Not on file   Social Connections: Unknown (6/18/2024)    Received  from [facility]    Social Connections     How often do you feel lonely or isolated from those around you? (Adult - for ages 18 years and over): Not on file   Intimate Partner Violence: Not on file   Housing Stability: Low Risk  (8/21/2024)    Housing Stability Vital Sign     Unable to Pay for Housing in the Last Year: No     Number of Times Moved in the Last Year: 0     Homeless in the Last Year: No        Family History:    Family History   Problem Relation Age of Onset    Cancer Father         Leukemia         Medications:     Current Facility-Administered Medications:     acetaminophen (TYLENOL) tablet 975 mg, 975 mg, Oral, Q6H SRIDHAR, Ophelia Leon PA-C, 975 mg at 08/27/24 0941    amiodarone (CORDARONE) 900 mg in dextrose 5 % 500 mL infusion, 1 mg/min, Intravenous, Continuous, Last Rate: 33.3 mL/hr at 08/27/24 1344, 1 mg/min at 08/27/24 1344 **FOLLOWED BY** amiodarone (CORDARONE) 900 mg in dextrose 5 % 500 mL infusion, 0.5 mg/min, Intravenous, Continuous, TANVIR Go    amiodarone 150 mg/3 mL injection **ADS Override Pull**, , , ,     amiodarone tablet 200 mg, 200 mg, Oral, BID With Meals, Ophelia Leon PA-C, 200 mg at 08/27/24 0638    [START ON 8/28/2024] bumetanide (BUMEX) tablet 2 mg, 2 mg, Oral, Daily, TANVIR Go    calcium carbonate (TUMS) chewable tablet 1,000 mg, 1,000 mg, Oral, Daily PRN, Breonna Santana PA-C    ceFAZolin (ANCEF) IVPB (premix in dextrose) 2,000 mg 50 mL, 2,000 mg, Intravenous, Q6H, Analisa Faulkner MD, Last Rate: 100 mL/hr at 08/27/24 1233, 2,000 mg at 08/27/24 1233    chlorhexidine (PERIDEX) 0.12 % oral rinse 15 mL, 15 mL, Mouth/Throat, Q12H SRIDHAR, Breonna Santana PA-C, 15 mL at 08/27/24 0846    heparin (porcine) 25,000 units in 0.45% NaCl 250 mL infusion (premix), 3-20 Units/kg/hr (Order-Specific), Intravenous, Titrated, TANVIR Go, Last Rate: 10 mL/hr at 08/27/24 0957, 11.1 Units/kg/hr at 08/27/24 0957    heparin (porcine) injection 2,000  Units, 2,000 Units, Intravenous, Q6H PRN, TANVIR Go    heparin (porcine) injection 4,000 Units, 4,000 Units, Intravenous, Q6H PRN, TANVIR Go    HYDROmorphone (DILAUDID) injection 0.5 mg, 0.5 mg, Intravenous, Q3H PRN, Breonna Santana PA-C, 0.5 mg at 08/27/24 0111    insulin regular (HumuLIN R,NovoLIN R) 1 Units/mL in sodium chloride 0.9 % 100 mL infusion, 0.3-21 Units/hr, Intravenous, Titrated, Ophelia Leon PA-C, Last Rate: 9 mL/hr at 08/27/24 1422, 9 Units/hr at 08/27/24 1422    methocarbamol (ROBAXIN) tablet 750 mg, 750 mg, Oral, Q6H SRIDHAR, Breonna Santana PA-C, 750 mg at 08/27/24 0846    metoprolol tartrate (LOPRESSOR) tablet 25 mg, 25 mg, Oral, Q12H SRIDHAR, TANVIR Go, 25 mg at 08/27/24 0730    ondansetron (ZOFRAN) injection 4 mg, 4 mg, Intravenous, Q6H PRN, Breonna Santana PA-C    oxyCODONE (ROXICODONE) immediate release tablet 10 mg, 10 mg, Oral, Q4H PRN, Breonna Santana PA-C, 10 mg at 08/27/24 0047    oxyCODONE (ROXICODONE) IR tablet 5 mg, 5 mg, Oral, Q4H PRN, Breonna Santana PA-C    polyethylene glycol (MIRALAX) packet 17 g, 17 g, Oral, Daily, Amy Franco PA-C, 17 g at 08/26/24 0901    polyethylene glycol (MIRALAX) packet 17 g, 17 g, Oral, Once, Amy Franco PA-C    [START ON 8/28/2024] potassium chloride (Klor-Con M20) CR tablet 40 mEq, 40 mEq, Oral, Daily, TANVIR Go    [START ON 8/28/2024] predniSONE tablet 20 mg, 20 mg, Oral, Daily **FOLLOWED BY** [START ON 8/29/2024] predniSONE tablet 10 mg, 10 mg, Oral, Daily, TANVIR Go    senna-docusate sodium (SENOKOT S) 8.6-50 mg per tablet 2 tablet, 2 tablet, Oral, BID, Amy Franco PA-C, 2 tablet at 08/27/24 0846    Past Medical History:     Past Medical History:   Diagnosis Date    Arthritis 2010's    Occasionally flares up    Cancer (HCC) May 2021    Still investigating    Chronic kidney disease     Diabetes mellitus (HCC)     Follicular lymphoma (HCC)     GERD  (gastroesophageal reflux disease) June 2021    Noticed in an Endoscopy    Heart murmur May 2020    High cholesterol     Hypertension     Kidney stone 1980's    Have had since 1980's    Obesity Since Childhood        Past Surgical History:     Past Surgical History:   Procedure Laterality Date    BUNIONECTOMY Right 11/24/2020    Procedure: RIGHT HAV CORRECTION,;  Surgeon: Niranjan Child DPM;  [facility];  Service: Podiatry    COLONOSCOPY      INCISION AND DRAINAGE OF WOUND Right 10/05/2016    Procedure: INCISION AND DRAINAGE (I&D) EXTREMITY;  Surgeon: Niranjan Child DPM;  Location[facility];  Service:     IR BIOPSY LYMPH NODE  07/08/2021    IR EMBOLIZATION (SPECIFY VESSEL OR SITE)  07/08/2021    IR PORT PLACEMENT  9/1/2022    PILONIDAL CYST EXCISION      ID CORRECTION HAMMERTOE Right 02/19/2019    Procedure: THIRD HAMMER TOE CORRECTION;  Surgeon: Niranjan Child DPM;  [facility];  Service: Podiatry    ID RMVL MATEO CTR VAD W/SUBQ PORT/ CTR/PRPH INSJ N/A 3/14/2023    Procedure: REMOVAL VENOUS PORT (PORT-A-CATH)IR;  Surgeon: Avelino Vázquez DO;  [facility];  Service: Interventional Radiology    TOE OSTEOTOMY Right 03/14/2017    Procedure: HAMMERTOE CORRECTION R 2 ;  Surgeon: Niranjan Child DPM;  [facility];  Service:     TOE OSTEOTOMY Left 11/24/2020    Procedure: LEFT HT CORRECTION TOE;  Surgeon: Niranjan Child DPM;  [facility];  Service: Podiatry    TONSILLECTOMY  1963         Allergies:     Allergies   Allergen Reactions    Lisinopril Cough           LABORATORY RESULTS:      Lab Results   Component Value Date    HGB 10.9 (L) 08/27/2024    HGB 12.2 10/06/2015    HCT 34.6 (L) 08/27/2024    HCT 36.8 10/06/2015    WBC 11.51 (H) 08/27/2024    WBC 7.50 10/06/2015     Lab Results   Component Value Date    BUN 90 (H) 08/27/2024    BUN 23 10/14/2020     10/06/2015    K 4.0 08/27/2024    K 4.4 10/14/2020    CL 91 (L) 08/27/2024     10/14/2020    GLUCOSE 217 (H)  07/08/2021    GLUCOSE 111 10/06/2015    CREATININE 2.24 (H) 08/27/2024    CREATININE 0.88 10/14/2020     Lab Results   Component Value Date    PROTIME 18.9 (H) 08/25/2024    PROTIME 14.3 (H) 10/06/2015    INR 1.56 (H) 08/25/2024    INR 1.14 10/06/2015        DIAGNOSTIC STUDIES: Reviewed  MRI lumbar spine wo contrast    Result Date: 8/27/2024  Impression: No discitis osteomyelitis involving the thoracic spine. Minimal thoracic spondylosis, as described above. No discitis osteomyelitis involving the lumbar spine. Multilevel lumbar spondylosis, as described above, contributing to at most moderate canal stenosis, right greater than left lateral recess stenosis, moderate to severe right and mild left neural foraminal stenosis at L4-L5. Discitis osteomyelitis again demonstrated at C5-C6, on the localizer/sagittal vertebral counting images. Right pleural effusion. Workstation performed: [id number]     MRI thoracic spine wo contrast    Result Date: 8/27/2024  Impression: No discitis osteomyelitis involving the thoracic spine. Minimal thoracic spondylosis, as described above. No discitis osteomyelitis involving the lumbar spine. Multilevel lumbar spondylosis, as described above, contributing to at most moderate canal stenosis, right greater than left lateral recess stenosis, moderate to severe right and mild left neural foraminal stenosis at L4-L5. Discitis osteomyelitis again demonstrated at C5-C6, on the localizer/sagittal vertebral counting images. Right pleural effusion. Workstation performed: [id number]     CT chest abdomen pelvis wo contrast    Result Date: 8/26/2024  Impression: 1.  New moderate nonspecific pericardial effusion and small bilateral pleural effusions. No other definitive manifestations of acute infection-allowing for the limitations of noncontrast CT 2.  Complex exophytic right renal cystic lesion, incompletely assessed by noncontrast CT. Nonemergent follow-up gadolinium enhanced MRI of the abdomen is  recommended for further evaluation. 3.  Please refer to the report body for description of other incidental chronic and/or benign findings. Workstation performed: [id number]     XR spine cervical 2 or 3 vw injury    Result Date: 8/26/2024  Impression: Destruction of the C5 to space with collapse of the superior endplate of C6 consistent with discitis osteomyelitis which has progressed from the prior studies. Increased soft tissue swelling anterior to C5-6 level. Left central venous catheter terminating at the confluence of the innominate veins. Workstation performed: [id number]     XR chest portable ICU    Result Date: 8/23/2024  Impression: Interval placement of a left IJ approach central venous catheter with tip terminating in the upper SVC near the brachiocephalic junction. Revision is advised. * I personally telephoned this result to Isael Cortes MD  on 8/23/2024 6:40 PM. Workstation performed: [id number]

## 2024-08-27 NOTE — PHYSICAL THERAPY NOTE
PHYSICAL THERAPY NOTE          Patient Name: Augustus Coffman  Today's Date: 8/27/2024 08/27/24 1635   PT Last Visit   PT Visit Date 08/27/24   Note Type   Note Type Treatment  (incl extended set-up pre and post mobilization; donning/doffing of the shoes/socks)   Pain Assessment   Pain Assessment Tool FLACC   Pain Location/Orientation Location: Neck   Pain Onset/Description Onset: Ongoing;Frequency: Intermittent;Descriptor: Aching;Descriptor: Discomfort   Effect of Pain on Daily Activities guarding w/ positional changes   Patient's Stated Pain Goal No pain   Hospital Pain Intervention(s) Repositioned;Ambulation/increased activity;Emotional support   Pain Rating: FLACC (Rest) - Face 0   Pain Rating: FLACC (Rest) - Legs 0   Pain Rating: FLACC (Rest) - Activity 0   Pain Rating: FLACC (Rest) - Cry 1   Pain Rating: FLACC (Rest) - Consolability 0   Score: FLACC (Rest) 1   Pain Rating: FLACC (Activity) - Face 1   Pain Rating: FLACC (Activity) - Legs 0   Pain Rating: FLACC (Activity) - Activity 0   Pain Rating: FLACC (Activity) - Cry 1   Pain Rating: FLACC (Activity) - Consolability 1   Score: FLACC (Activity) 3   Restrictions/Precautions   RLE Weight Bearing Per Order WBAT   Braces or Orthoses C/S Collar;Other (Comment)  ((R) sx shoe and pt's own (L) shoe)   Other Precautions Multiple lines;Telemetry;Fall Risk;Pain;Spinal precautions   General   Chart Reviewed Yes   Additional Pertinent History cleared for Tx session by nsg   Response to Previous Treatment Patient with no complaints from previous session.   Family/Caregiver Present Yes   Cognition   Overall Cognitive Status WFL   Arousal/Participation Alert;Cooperative   Attention Attends with cues to redirect   Orientation Level Oriented to person;Oriented to place;Oriented to situation   Memory Decreased recall of precautions   Following Commands Follows one step commands without  difficulty   Subjective   Subjective Alert; in bed; agreeable to mobilize   Bed Mobility   Supine to Sit 3  Moderate assistance   Additional items Assist x 1;HOB elevated;Increased time required;Verbal cues;LE management   Additional Comments Denies dizziness on edge of bed; BP stable (taken in supine and sitting and later in the chair)   Transfers   Sit to Stand 3  Moderate assistance   Additional items Assist x 2;Increased time required;Verbal cues  (elevated bed)   Stand to Sit 3  Moderate assistance   Additional items Assist x 1;Increased time required;Verbal cues   Stand pivot 3  Moderate assistance   Additional items Assist x 2;Increased time required;Verbal cues  (took a few steps from bed to chair to the (R) side)   Ambulation/Elevation   Gait pattern Not appropriate;Not tested  (HR in 130s bpm)   Assistive Device Rolling walker   Balance   Static Sitting Fair -   Dynamic Sitting Poor +   Static Standing Poor   Dynamic Standing Poor   Activity Tolerance   Activity Tolerance Patient limited by fatigue;Patient limited by pain   Nurse Made Aware spoke to ANDERSON Hill   Exercises   Knee AROM Long Arc Quad Sitting;15 reps;AROM;Bilateral   Ankle Pumps Sitting;15 reps;AROM;Bilateral   Marching Sitting;5 reps;AROM;Bilateral  (2 sets)   Assessment   Prognosis Good   Problem List Decreased strength;Decreased endurance;Impaired balance;Decreased mobility;Orthopedic restrictions;Pain   Assessment Pt cont to demonstrate overall weakness, deconditioning and inconsistent LE control in standing w/ mod (A) required w/ all aspects of mobility at bedside; extended rest periods provided t/o the session w/ breathing therex; amb was not attempted due to persistently elevated HR in 120s-130s bpm; BP stable; will cont to follow pt in PT for progressive mobilization to max functional status; D/C recommendations are listed below   Barriers to Discharge Inaccessible home environment   Goals   Patient Goals to have less pain   STG  Expiration Date 09/05/24   PT Treatment Day 1   Plan   Treatment/Interventions Functional transfer training;LE strengthening/ROM;Therapeutic exercise;Endurance training;Bed mobility;Gait training;Spoke to nursing;Spoke to case management   Progress Slow progress, medical status limitations   PT Frequency 3-5x/wk   Discharge Recommendation   Rehab Resource Intensity Level, PT I (Maximum Resource Intensity)   Equipment Recommended Walker   Walker Package Recommended Wheeled walker   AM-PAC Basic Mobility Inpatient   Turning in Flat Bed Without Bedrails 3   Lying on Back to Sitting on Edge of Flat Bed Without Bedrails 2   Moving Bed to Chair 2   Standing Up From Chair Using Arms 2   Walk in Room 1   Climb 3-5 Stairs With Railing 1   Basic Mobility Inpatient Raw Score 11   Basic Mobility Standardized Score 30.25   [facility] Highest Level Of Mobility   -HLM Goal 4: Move to chair/commode   -[facility] Achieved 4: Move to chair/commode   Education   Education Provided Mobility training;Home exercise program;Assistive device   Patient Demonstrates verbal understanding;Reinforcement needed   End of Consult   Patient Position at End of Consult Bedside chair;All needs within reach     Angel Sandoval

## 2024-08-27 NOTE — PLAN OF CARE
Problem: PHYSICAL THERAPY ADULT  Goal: Performs mobility at highest level of function for planned discharge setting.  See evaluation for individualized goals.  Description: Treatment/Interventions: Functional transfer training, LE strengthening/ROM, Elevations, Therapeutic exercise, Endurance training, Equipment eval/education, Bed mobility, Gait training, Spoke to nursing, Spoke to case management, OT          See flowsheet documentation for full assessment, interventions and recommendations.  Outcome: Progressing  Note: Prognosis: Good  Problem List: Decreased strength, Decreased endurance, Impaired balance, Decreased mobility, Orthopedic restrictions, Pain  Assessment: Pt cont to demonstrate overall weakness, deconditioning and inconsistent LE control in standing w/ mod (A) required w/ all aspects of mobility at bedside; extended rest periods provided t/o the session w/ breathing therex; amb was not attempted due to persistently elevated HR in 120s-130s bpm; BP stable; will cont to follow pt in PT for progressive mobilization to max functional status; D/C recommendations are listed below  Barriers to Discharge: Inaccessible home environment     Rehab Resource Intensity Level, PT: I (Maximum Resource Intensity)    See flowsheet documentation for full assessment.

## 2024-08-27 NOTE — ASSESSMENT & PLAN NOTE
Last BM Date: 08/20/24  Bowel regimen: Colace twice daily, senna daily, MiraLAX daily  Increase senna to twice daily  Give second dose of MiraLAX in afternoon tomorrow

## 2024-08-27 NOTE — ASSESSMENT & PLAN NOTE
Lab Results   Component Value Date    HGBA1C 6.8 (H) 07/05/2024       Recent Labs     08/26/24 2018 08/26/24 2158 08/26/24 2357 08/27/24  0310   POCGLU 151* 139 154* 145*         Blood Sugar Average: Last 72 hrs:  (P) [id number]

## 2024-08-27 NOTE — ASSESSMENT & PLAN NOTE
Baseline Cr 1-1.2  Developed FINA 8/23 with associated hyperkalemia    Plan:  Nephrology following, appreciate their recommendations  Maintain avilez  Avoid nephrotoxins  Strict I/Os  Trend renal function  Continue diuresis - can likely decrease to daily dosing/p.o. dosing today

## 2024-08-28 ENCOUNTER — ANESTHESIA (OUTPATIENT)
Dept: ANESTHESIOLOGY | Facility: HOSPITAL | Age: 69
End: 2024-08-28

## 2024-08-28 ENCOUNTER — ANESTHESIA EVENT (OUTPATIENT)
Dept: ANESTHESIOLOGY | Facility: HOSPITAL | Age: 69
End: 2024-08-28

## 2024-08-28 LAB
ALBUMIN SERPL BCG-MCNC: 3.5 G/DL (ref 3.5–5)
ALP SERPL-CCNC: 218 U/L (ref 34–104)
ALT SERPL W P-5'-P-CCNC: 7 U/L (ref 7–52)
ANION GAP SERPL CALCULATED.3IONS-SCNC: 13 MMOL/L (ref 4–13)
APTT PPP: 56 SECONDS (ref 23–34)
AST SERPL W P-5'-P-CCNC: 99 U/L (ref 13–39)
ATRIAL RATE: 258 BPM
BACTERIA BLD CULT: NORMAL
BACTERIA BLD CULT: NORMAL
BILIRUB SERPL-MCNC: 0.51 MG/DL (ref 0.2–1)
BUN SERPL-MCNC: 87 MG/DL (ref 5–25)
CALCIUM SERPL-MCNC: 9.5 MG/DL (ref 8.4–10.2)
CHLORIDE SERPL-SCNC: 93 MMOL/L (ref 96–108)
CO2 SERPL-SCNC: 32 MMOL/L (ref 21–32)
CREAT SERPL-MCNC: 1.83 MG/DL (ref 0.6–1.3)
ERYTHROCYTE [DISTWIDTH] IN BLOOD BY AUTOMATED COUNT: 17.5 % (ref 11.6–15.1)
GFR SERPL CREATININE-BSD FRML MDRD: 37 ML/MIN/1.73SQ M
GLUCOSE SERPL-MCNC: 109 MG/DL (ref 65–140)
GLUCOSE SERPL-MCNC: 129 MG/DL (ref 65–140)
GLUCOSE SERPL-MCNC: 134 MG/DL (ref 65–140)
GLUCOSE SERPL-MCNC: 137 MG/DL (ref 65–140)
GLUCOSE SERPL-MCNC: 144 MG/DL (ref 65–140)
GLUCOSE SERPL-MCNC: 178 MG/DL (ref 65–140)
GLUCOSE SERPL-MCNC: 191 MG/DL (ref 65–140)
GLUCOSE SERPL-MCNC: 208 MG/DL (ref 65–140)
GLUCOSE SERPL-MCNC: 97 MG/DL (ref 65–140)
HCT VFR BLD AUTO: 37.6 % (ref 36.5–49.3)
HGB BLD-MCNC: 11.4 G/DL (ref 12–17)
MAGNESIUM SERPL-MCNC: 2.2 MG/DL (ref 1.9–2.7)
MCH RBC QN AUTO: 24.6 PG (ref 26.8–34.3)
MCHC RBC AUTO-ENTMCNC: 30.3 G/DL (ref 31.4–37.4)
MCV RBC AUTO: 81 FL (ref 82–98)
P AXIS: 69 DEGREES
PHOSPHATE SERPL-MCNC: 2.8 MG/DL (ref 2.3–4.1)
PLATELET # BLD AUTO: 450 THOUSANDS/UL (ref 149–390)
PMV BLD AUTO: 10.2 FL (ref 8.9–12.7)
POTASSIUM SERPL-SCNC: 3.5 MMOL/L (ref 3.5–5.3)
PROT SERPL-MCNC: 7.2 G/DL (ref 6.4–8.4)
QRS AXIS: 6 DEGREES
QRSD INTERVAL: 94 MS
QT INTERVAL: 266 MS
QTC INTERVAL: 389 MS
RBC # BLD AUTO: 4.64 MILLION/UL (ref 3.88–5.62)
SODIUM SERPL-SCNC: 138 MMOL/L (ref 135–147)
T WAVE AXIS: 53 DEGREES
VENTRICULAR RATE: 129 BPM
WBC # BLD AUTO: 12.78 THOUSAND/UL (ref 4.31–10.16)

## 2024-08-28 PROCEDURE — 82948 REAGENT STRIP/BLOOD GLUCOSE: CPT

## 2024-08-28 PROCEDURE — 85730 THROMBOPLASTIN TIME PARTIAL: CPT | Performed by: STUDENT IN AN ORGANIZED HEALTH CARE EDUCATION/TRAINING PROGRAM

## 2024-08-28 PROCEDURE — 99232 SBSQ HOSP IP/OBS MODERATE 35: CPT | Performed by: INTERNAL MEDICINE

## 2024-08-28 PROCEDURE — 99233 SBSQ HOSP IP/OBS HIGH 50: CPT | Performed by: PHYSICIAN ASSISTANT

## 2024-08-28 PROCEDURE — NC001 PR NO CHARGE: Performed by: NURSE PRACTITIONER

## 2024-08-28 PROCEDURE — 83735 ASSAY OF MAGNESIUM: CPT | Performed by: NURSE PRACTITIONER

## 2024-08-28 PROCEDURE — 84100 ASSAY OF PHOSPHORUS: CPT | Performed by: STUDENT IN AN ORGANIZED HEALTH CARE EDUCATION/TRAINING PROGRAM

## 2024-08-28 PROCEDURE — 93010 ELECTROCARDIOGRAM REPORT: CPT | Performed by: INTERNAL MEDICINE

## 2024-08-28 PROCEDURE — 97116 GAIT TRAINING THERAPY: CPT

## 2024-08-28 PROCEDURE — 97530 THERAPEUTIC ACTIVITIES: CPT

## 2024-08-28 PROCEDURE — 80053 COMPREHEN METABOLIC PANEL: CPT | Performed by: NURSE PRACTITIONER

## 2024-08-28 PROCEDURE — NC001 PR NO CHARGE: Performed by: ANESTHESIOLOGY

## 2024-08-28 PROCEDURE — 85027 COMPLETE CBC AUTOMATED: CPT | Performed by: NURSE PRACTITIONER

## 2024-08-28 PROCEDURE — 99233 SBSQ HOSP IP/OBS HIGH 50: CPT | Performed by: INTERNAL MEDICINE

## 2024-08-28 RX ORDER — INSULIN LISPRO 100 [IU]/ML
1-5 INJECTION, SOLUTION INTRAVENOUS; SUBCUTANEOUS
Status: DISCONTINUED | OUTPATIENT
Start: 2024-08-28 | End: 2024-09-03 | Stop reason: HOSPADM

## 2024-08-28 RX ADMIN — METOPROLOL TARTRATE 25 MG: 25 TABLET, FILM COATED ORAL at 09:32

## 2024-08-28 RX ADMIN — POLYETHYLENE GLYCOL 3350 17 G: 17 POWDER, FOR SOLUTION ORAL at 09:33

## 2024-08-28 RX ADMIN — POTASSIUM CHLORIDE 40 MEQ: 1500 TABLET, EXTENDED RELEASE ORAL at 09:32

## 2024-08-28 RX ADMIN — ACETAMINOPHEN 975 MG: 325 TABLET ORAL at 09:38

## 2024-08-28 RX ADMIN — AMIODARONE HYDROCHLORIDE 0.5 MG/MIN: 50 INJECTION, SOLUTION INTRAVENOUS at 14:20

## 2024-08-28 RX ADMIN — AMIODARONE HYDROCHLORIDE 200 MG: 200 TABLET ORAL at 06:41

## 2024-08-28 RX ADMIN — CEFAZOLIN SODIUM 2000 MG: 2 SOLUTION INTRAVENOUS at 06:06

## 2024-08-28 RX ADMIN — HEPARIN SODIUM 17.1 UNITS/KG/HR: 10000 INJECTION, SOLUTION INTRAVENOUS at 04:11

## 2024-08-28 RX ADMIN — METHOCARBAMOL 750 MG: 750 TABLET ORAL at 14:52

## 2024-08-28 RX ADMIN — METHOCARBAMOL 750 MG: 750 TABLET ORAL at 21:23

## 2024-08-28 RX ADMIN — INSULIN LISPRO 1 UNITS: 100 INJECTION, SOLUTION INTRAVENOUS; SUBCUTANEOUS at 21:22

## 2024-08-28 RX ADMIN — CEFAZOLIN SODIUM 2000 MG: 2 SOLUTION INTRAVENOUS at 12:19

## 2024-08-28 RX ADMIN — ACETAMINOPHEN 975 MG: 325 TABLET ORAL at 04:12

## 2024-08-28 RX ADMIN — AMIODARONE HYDROCHLORIDE 200 MG: 200 TABLET ORAL at 17:35

## 2024-08-28 RX ADMIN — CEFAZOLIN SODIUM 2000 MG: 2 SOLUTION INTRAVENOUS at 23:43

## 2024-08-28 RX ADMIN — METOPROLOL TARTRATE 25 MG: 25 TABLET, FILM COATED ORAL at 21:23

## 2024-08-28 RX ADMIN — BUMETANIDE 2 MG: 2 TABLET ORAL at 09:32

## 2024-08-28 RX ADMIN — APIXABAN 5 MG: 5 TABLET, FILM COATED ORAL at 17:35

## 2024-08-28 RX ADMIN — APIXABAN 5 MG: 5 TABLET, FILM COATED ORAL at 09:48

## 2024-08-28 RX ADMIN — PREDNISONE 20 MG: 20 TABLET ORAL at 09:32

## 2024-08-28 RX ADMIN — HEPARIN SODIUM 2000 UNITS: 1000 INJECTION INTRAVENOUS; SUBCUTANEOUS at 06:40

## 2024-08-28 RX ADMIN — INSULIN LISPRO 1 UNITS: 100 INJECTION, SOLUTION INTRAVENOUS; SUBCUTANEOUS at 17:36

## 2024-08-28 RX ADMIN — CHLORHEXIDINE GLUCONATE 0.12% ORAL RINSE 15 ML: 1.2 LIQUID ORAL at 21:23

## 2024-08-28 RX ADMIN — METHOCARBAMOL 750 MG: 750 TABLET ORAL at 02:32

## 2024-08-28 RX ADMIN — SENNOSIDES AND DOCUSATE SODIUM 2 TABLET: 50; 8.6 TABLET ORAL at 09:32

## 2024-08-28 RX ADMIN — SENNOSIDES AND DOCUSATE SODIUM 2 TABLET: 50; 8.6 TABLET ORAL at 17:35

## 2024-08-28 RX ADMIN — ACETAMINOPHEN 975 MG: 325 TABLET ORAL at 17:35

## 2024-08-28 RX ADMIN — METHOCARBAMOL 750 MG: 750 TABLET ORAL at 09:32

## 2024-08-28 RX ADMIN — CHLORHEXIDINE GLUCONATE 0.12% ORAL RINSE 15 ML: 1.2 LIQUID ORAL at 09:33

## 2024-08-28 RX ADMIN — CEFAZOLIN SODIUM 2000 MG: 2 SOLUTION INTRAVENOUS at 17:36

## 2024-08-28 NOTE — ASSESSMENT & PLAN NOTE
Lab Results   Component Value Date    HGBA1C 6.8 (H) 07/05/2024       Recent Labs     08/27/24 2052 08/27/24 2201 08/28/24  0000 08/28/24  0146   POCGLU 123 118 97 109         Blood Sugar Average: Last 72 hrs:  (P) [id number]  Home regimen: metformin  Insulin infusion for better glucose control   Goal blood glucose 140-180  Transition back to SSI once tolerating PO diet

## 2024-08-28 NOTE — ASSESSMENT & PLAN NOTE
8/22 Echo: Limited echo for assessment of endocarditis.  The patient's aortic valve is difficult to assess due to calcification but there is no new significant regurgitation.  The mitral valve has hyperechoic thickening at the tips consistent with most likely calcification, these were seen on the echocardiograms in July.  The tricuspid valve similarly was not well-visualized but no new regurgitation.  The pulmonary valve was not well-visualized. Off note patient has new moderate pericardial effusion without specific echocardiographic signs of tamponade  Patient with symptoms consistent with pericarditis  8/27 TTE - EF 50%, mod focal calcification mitral valve, moderate circumferential pericardial effusion, fluid with fibrinous appearance, no sign of tamponade       Plan:  Cardiology consulted, no plans for drainage   Discuss window with thoracic surgery  Colchicine and Prednisone discontinued 8/27  Close hemodynamic monitoring

## 2024-08-28 NOTE — PROGRESS NOTES
Cardiology Progress Note - Augustus Coffman 68 y.o. male MRN: [id number]    Unit/Bed#: PPHP-310-01 Encounter: [id number]      Assessment:  Principal Problem:    MSSA bacteremia  Active Problems:    History of hypertension    Diabetic peripheral neuropathy (HCC)    Type 2 diabetes mellitus (HCC)    FINA (acute kidney injury) on CKD stage 2(HCC)    Acute osteomyelitis of cervical spine (HCC)    Transaminitis    New onset a-fib (HCC)    Acute pericarditis    Diabetic ulcer of right foot (HCC)    Acute respiratory insufficiency    Acute neck pain    Constipation  Assessment/Plan:      Acute pericarditis with moderate pericardial effusion  -Given prolonged Staph aureus bacteremia, concern that this may be purulent pericarditis  -TTE on 8/22 showed moderate pericardial effusion without findings of tamponade and mitral valve with hyperechoic thickening at the tips consistent with most likely calcification  --Repeat TTE on 8/24 showed LVEF 45%, again no findings of tamponade  --Thoracic surgery evaluated, no plan for intervention as long as echo continues to show no tamponade physiology  - Repeat TTE on 8/27 showed LVEF 50%, no findings of tamponade and moderate sized pericardial effusion, stable in size compared to last echo  --Will need eventual LUCRECIA to rule out endocarditis. At this time blood cultures from 8/23 remain no growth at 4 days, so will hold off on LUCRECIA for now. Will also hold given pt is currently a poor candidate for intubation due to his cervical collar, and management also would not change since pt is already on a 6 week course of abx   --Per ID, recommend getting a pericardiocentesis for fluid cultures/cell counts. However, discussed with interventional cardiology and the risks of performing a pericardiocentesis on this pt outweigh the benefits at this time. Would expect presentation to be more severe if this were truly purulent pericarditis.  - Completed course of colchicine for pericarditis; continue  "prednisone.     2.  New onset atrial fibrillation with RVR  - Switched from metoprolol tartrate to amiodarone on 8/23 due to new hypotension in setting of distributive shock. Resumed metoprolol tartrate 25 mg BID on 8/26 as BP improved and pt remained tachycardic  -- Pt noted to be in atrial flutter on 8/27, so loaded with IV amiodarone 150 mg bolus. Continue amiodarone gtt  -- Continue amiodarone 200 mg BID  -- Continue Lopressor 25 mg BID  - Started Eliquis 5 mg BID for anticoagulation, d/c heparin  - Continue to monitor heart rate trends on telemetry     3.  MSSA bacteremia with cervical discitis/osteomyelitis  - Further management per infectious disease and primary team  -- On Ancef 2 g q6h     4.  Transaminitis  - Continue to monitor LFTs closely      5.  Hypertension  - BP running soft at this time  -- Continue lopressor 25 mg BID and amiodarone 200 mg BID  - Continue to monitor blood pressures closely      6.  Thoracic aortic aneurysm  - Continue with periodic surveillance     7. Aortic stenosis  - Continue periodic outpatient surveillance           Case discussed and reviewed with Dr. Rayo. Please wait for final recommendations from Dr. Rayo. Thank you for involving us in the care of your patient.      Subjective:   Patient seen and examined.  No significant events overnight.      Objective:     Vitals: Blood pressure 110/72, pulse 83, temperature 97.7 °F (36.5 °C), temperature source Oral, resp. rate 15, height 6' 3\" (1.905 m), weight 115 kg (253 lb 8.5 oz), SpO2 97%., Body mass index is 31.69 kg/m².,   Orthostatic Blood Pressures      Flowsheet Row Most Recent Value   Blood Pressure 110/72 filed at 08/28/2024 0600   Patient Position - Orthostatic VS Sitting filed at 08/27/2024 1230              Intake/Output Summary (Last 24 hours) at 8/28/2024 0900  Last data filed at 8/28/2024 0639  Gross per 24 hour   Intake 1886.21 ml   Output 3775 ml   Net -1888.79 ml       On telemetry review, pt noted to be " in atrial flutter and then atrial fibrillation with HR 110s-120s until 5 AM today, after which he converted to NSR with HR 70s-80s.       Physical Exam:    GEN: Augustus Coffman appears well, alert and oriented x 3, pleasant and cooperative   HEENT: pupils equal, round, and reactive to light; extraocular muscles intact  NECK: supple, no carotid bruits   HEART: regular rhythm, normal S1 and S2, no murmurs, clicks, gallops or rubs   LUNGS: clear to auscultation bilaterally; no wheezes, rales, or rhonchi   ABDOMEN: normal bowel sounds, soft, no tenderness, no distention  EXTREMITIES: peripheral pulses normal; no clubbing, cyanosis, or edema  NEURO: no focal findings   SKIN: normal without suspicious lesions on exposed skin    Medications:      Current Facility-Administered Medications:     acetaminophen (TYLENOL) tablet 975 mg, 975 mg, Oral, Q6H SRIDHAR, Ophelia Leon PA-C, 975 mg at 24 0412    [] amiodarone (CORDARONE) 900 mg in dextrose 5 % 500 mL infusion, 1 mg/min, Intravenous, Continuous, Stopped at 24 **FOLLOWED BY** amiodarone (CORDARONE) 900 mg in dextrose 5 % 500 mL infusion, 0.5 mg/min, Intravenous, Continuous, TANVIR Go, Last Rate: 16.7 mL/hr at 24, 0.5 mg/min at 24    amiodarone tablet 200 mg, 200 mg, Oral, BID With Meals, Ophelia Leon PA-C, 200 mg at 24 0641    bumetanide (BUMEX) tablet 2 mg, 2 mg, Oral, Daily, TANVIR Go    calcium carbonate (TUMS) chewable tablet 1,000 mg, 1,000 mg, Oral, Daily PRN, Breonna Santana PA-C    ceFAZolin (ANCEF) IVPB (premix in dextrose) 2,000 mg 50 mL, 2,000 mg, Intravenous, Q6H, Analisa Faulkner MD, Last Rate: 100 mL/hr at 24 0606, 2,000 mg at 24 0606    chlorhexidine (PERIDEX) 0.12 % oral rinse 15 mL, 15 mL, Mouth/Throat, Q12H SRIDHAR, Breonna Santana PA-C, 15 mL at 24 2134    heparin (porcine) 25,000 units in 0.45% NaCl 250 mL infusion (premix), 3-20 Units/kg/hr  (Order-Specific), Intravenous, Titrated, TANVIR Go, Last Rate: 17.2 mL/hr at 08/28/24 0639, 19.1 Units/kg/hr at 08/28/24 0639    heparin (porcine) injection 2,000 Units, 2,000 Units, Intravenous, Q6H PRN, TANVIR Go, 2,000 Units at 08/28/24 0640    heparin (porcine) injection 4,000 Units, 4,000 Units, Intravenous, Q6H PRN, TANVIR Go, 4,000 Units at 08/27/24 1747    HYDROmorphone (DILAUDID) injection 0.5 mg, 0.5 mg, Intravenous, Q3H PRN, Breonna Santana PA-C, 0.5 mg at 08/27/24 0111    insulin regular (HumuLIN R,NovoLIN R) 1 Units/mL in sodium chloride 0.9 % 100 mL infusion, 0.3-21 Units/hr, Intravenous, Titrated, Ophelia Leon PA-C, Last Rate: 2 mL/hr at 08/28/24 0605, 2 Units/hr at 08/28/24 0605    methocarbamol (ROBAXIN) tablet 750 mg, 750 mg, Oral, Q6H SRIDHAR, Breonna Santana PA-C, 750 mg at 08/28/24 0232    metoprolol tartrate (LOPRESSOR) tablet 25 mg, 25 mg, Oral, Q12H SRIDHAR, TANVIR Go, 25 mg at 08/27/24 0730    ondansetron (ZOFRAN) injection 4 mg, 4 mg, Intravenous, Q6H PRN, Breonna Santana PA-C    oxyCODONE (ROXICODONE) immediate release tablet 10 mg, 10 mg, Oral, Q4H PRN, Breonna Santana PA-C, 10 mg at 08/27/24 0047    oxyCODONE (ROXICODONE) IR tablet 5 mg, 5 mg, Oral, Q4H PRN, Breonna Santana PA-C    polyethylene glycol (MIRALAX) packet 17 g, 17 g, Oral, Daily, Amy Franco PA-C, 17 g at 08/26/24 0901    potassium chloride (Klor-Con M20) CR tablet 40 mEq, 40 mEq, Oral, Daily, TANVIR Go    predniSONE tablet 20 mg, 20 mg, Oral, Daily **FOLLOWED BY** [START ON 8/29/2024] predniSONE tablet 10 mg, 10 mg, Oral, Daily, TANVIR Go    senna-docusate sodium (SENOKOT S) 8.6-50 mg per tablet 2 tablet, 2 tablet, Oral, BID, Amy Franco PA-C, 2 tablet at 08/27/24 1959     Labs & Results:    Results from last 7 days   Lab Units 08/24/24  0201   CK TOTAL U/L 22*     Results from last 7 days   Lab Units  08/28/24  0500 08/27/24  0500 08/26/24  0455   WBC Thousand/uL 12.78* 11.51* 14.22*   HEMOGLOBIN g/dL 11.4* 10.9* 11.2*   HEMATOCRIT % 37.6 34.6* 36.9   PLATELETS Thousands/uL 450* 432* 434*         Results from last 7 days   Lab Units 08/28/24  0000 08/27/24  1340 08/27/24  0444 08/26/24  0508   POTASSIUM mmol/L 3.5 4.0 3.2* 4.0   CHLORIDE mmol/L 93* 91* 89* 91*   CO2 mmol/L 32 32 33* 30   BUN mg/dL 87* 90* 90* 70*   CREATININE mg/dL 1.83* 2.24* 2.59* 2.73*   CALCIUM mg/dL 9.5 9.4 9.3 9.7   ALK PHOS U/L 218*  --  268* 302*   ALT U/L 7  --  15 33   AST U/L 99*  --  257* 513*     Results from last 7 days   Lab Units 08/28/24  0605 08/27/24  2313 08/27/24  1626 08/26/24  0453 08/25/24  0405 08/22/24  1552 08/22/24  0926   INR   --   --   --   --  1.56*  --  1.21*   PTT seconds 56* 55* 34   < > 61*   < > 40*    < > = values in this interval not displayed.     Results from last 7 days   Lab Units 08/28/24  0000 08/27/24  0444 08/26/24  0508   MAGNESIUM mg/dL 2.2 2.1 2.3

## 2024-08-28 NOTE — CONSULTS
e-Consult (IPC)  - Interventional Radiology  Augustus Coffman 68 y.o. male MRN: [id number]  Unit/Bed#: PPHP-310-01 Encounter: [id number]          Interventional Radiology has been consulted to evaluate Augustus Coffman    We were consulted by Critical Care concerning this patient with MSSA bacteremia and need for long-term antibiotics.    Inpatient Consult to IR  Consult performed by: TANVIR Herrera  Consult ordered by: Ophelia Leon PA-C        08/28/24    Assessment/Recommendation:   67 yo male with HTN, DM type 2, with prior MSSA bacteremia related to diabetic foot wound, and h/o follicular lymphoma who initially presented to [facility] on 8/20/24 with concern for osteomyelitis/diskitis on MRI cervical spine.    During admission he was found to have pericarditis and Afib with RVR for which he was transferred to [facility] for further management.     Hospital course complicated by septic shock, MSSA bacteremia, UTI and FINA.     Patient currently has Left IJ central line placed by ICU on 8/23/24.     ID following with plan for 6 week course of antibiotics.   Given FINA on CKD stage 3 with GFR 37, unable to have PICC line placed. Plan for patient to be transferred out of ICU today and possible discharge within 3 days.     -Plan for tunneled central line placement tomorrow pending IR scheduling.   -remainder of care per Critical Care team     21-30 minutes, >50% of the total time devoted to medical consultative verbal/EMR discussion between providers. Written report will be generated in the EMR.     Thank you for allowing Interventional Radiology to participate in the care of Augustus Coffman. Please don't hesitate to call or TigerText us with any questions.     TANVIR Herrera

## 2024-08-28 NOTE — ASSESSMENT & PLAN NOTE
Weaned to 4- 6 L nasal cannula from [facility]  Appears related to low lung volumes 2/2 pain  Wean O2 as tolerated to maintain Spo2 > 92%  IS, OOB

## 2024-08-28 NOTE — ARC ADMISSION
ARC has reviewed referral.  No determination has been made at this time.  As per PM&R consult, disposition is TBD.  Patient needs to demonstrate improved activity tolerance and medical stability.  CM has been updated.

## 2024-08-28 NOTE — OCCUPATIONAL THERAPY NOTE
Occupational Therapy Progress Note     Patient Name: Augustus Coffman  Today's Date: 8/28/2024  Problem List  Principal Problem:    MSSA bacteremia  Active Problems:    History of hypertension    Diabetic peripheral neuropathy (HCC)    Type 2 diabetes mellitus (HCC)    FINA (acute kidney injury) on CKD stage 2(HCC)    Acute osteomyelitis of cervical spine (HCC)    Transaminitis    New onset a-fib (HCC)    Acute pericarditis    Diabetic ulcer of right foot (HCC)    Acute respiratory insufficiency    Acute neck pain    Constipation        08/28/24 1207   OT Last Visit   OT Visit Date 08/28/24   Note Type   Note Type Treatment   Pain Assessment   Pain Assessment Tool 0-10   Pain Score No Pain   Restrictions/Precautions   Weight Bearing Precautions Per Order Yes   RLE Weight Bearing Per Order WBAT   Braces or Orthoses   (R surgical shoe)   Other Precautions Chair Alarm;Bed Alarm;Multiple lines;Telemetry;O2;Fall Risk;Pain;Spinal precautions  (6L O2)   Lifestyle   Autonomy Pt reports pta he was I with ADL, IADL and functional mobility, (+)    Reciprocal Relationships supportive spouse   Service to Others 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 [employer]   Intrinsic Gratification enjoys working   ADL   Where Assessed Chair   LB Dressing Assistance 2  Maximal Assistance   LB Dressing Deficit Setup;Verbal cueing;Don/doff R sock;Don/doff L sock   LB Dressing Comments Pt requires Max A to don surgical shoe and sneaker   Functional Standing Tolerance   Time ~5 minutes   Activity Functional transfer training   Comments Pt able to tolerate ~5 minutes of standing w/ RW for support   Bed Mobility   Additional Comments Pt greeted and left OOB in chair w/ all needs within reach   Transfers   Sit to Stand 4  Minimal assistance   Additional items Assist x 1;Increased time required;Verbal cues   Stand to Sit 4  Minimal assistance   Additional items Assist x 1;Increased time required;Verbal cues   Additional Comments STS x4 t/o session w/ RW and  "seated rest breaks in between + assist of another for line management   Functional Mobility   Functional Mobility 4  Minimal assistance   Additional Comments Assist x1. Pt takes steps forward and backward from chair w/ RW. Pt reports SOB during ambulation and standing which resolves when seated. BP, HR, SpO2 WNL t/o session.   Additional items Rolling walker   Subjective   Subjective \"It feels good in my legs to be up\"   Cognition   Overall Cognitive Status WFL   Arousal/Participation Alert;Cooperative   Attention Attends with cues to redirect   Orientation Level Oriented X4   Memory Decreased recall of precautions   Following Commands Follows one step commands without difficulty   Comments Pt is pleasant and cooperative. Pt is motivated to participate and work toward goals.   Activity Tolerance   Activity Tolerance Patient tolerated treatment well;Treatment limited secondary to medical complications (Comment)  (SOB)   Medical Staff Made Aware PT Adeola; ANDERSON cleared   Assessment   Assessment Pt seen for skilled OT treatment session on this date w/ interventions focusing on ADL participation, activity tolerance, standing tolerance, standing balance, transfer skills, and fxnl mobility. Pt was agreeable and willing to participate in session. Pt engaged in the following tasks: Max A LB dressing; Min Ax1 STS, functional mobility. In comparison to previous session, pt demonstrated improvements in functional mobility and standing tolerance. HR, BP, SpO2 WNL t/o session. Pt required verbal cues for correct technique, frequent rest periods, and ocassional safety reminders. Pt continues to be functioning below baseline level as occupational performance remains limited by decreased ADL status, decreased activity tolerance, decreased endurance, decreased standing tolerance, decreased standing balance, decreased transfer skills, decreased fxnl mobility, pain, and WBS restrictions. The patient's raw score on the AM-PAC Daily " Activity Inpatient Short Form is 16. A raw score of less than 19 suggests the patient may benefit from discharge to post-acute rehabilitation services. Please refer to the recommendation of the Occupational Therapist for safe discharge planning.   From OT standpoint, recommend Level II (Moderate Resource Intensity) at time of d/c. Pt will benefit from continued OT treatment while in acute care to address deficits as defined above and maximize level of functional independence with ADLs and functional mobility. Pt left OOB in chair w/ all needs within reach at end of session.   Plan   Treatment Interventions ADL retraining;Functional transfer training;Endurance training;Patient/family training;Equipment evaluation/education;Continued evaluation;Cardiac education;Energy conservation;Activityengagement   Goal Expiration Date 09/09/24   OT Treatment Day 1   OT Frequency 2-3x/wk   Discharge Recommendation   Rehab Resource Intensity Level, OT II (Moderate Resource Intensity)   AM-PAC Daily Activity Inpatient   Lower Body Dressing 2   Bathing 2   Toileting 2   Upper Body Dressing 3   Grooming 3   Eating 4   Daily Activity Raw Score 16   Daily Activity Standardized Score (Calc for Raw Score >=11) 35.96   AM-PAC Applied Cognition Inpatient   Following a Speech/Presentation 4   Understanding Ordinary Conversation 4   Taking Medications 4   Remembering Where Things Are Placed or Put Away 4   Remembering List of 4-5 Errands 4   Taking Care of Complicated Tasks 4   Applied Cognition Raw Score 24   Applied Cognition Standardized Score 62.21   End of Consult   Education Provided Yes;Family or social support of family present for education by provider   Patient Position at End of Consult Bedside chair;All needs within reach   Nurse Communication Nurse aware of consult       ADI Mendez, OTR/L           Detail Level: Detailed

## 2024-08-28 NOTE — RESTORATIVE TECHNICIAN NOTE
Restorative Technician Note      Patient Name: Augustus OSBORN Augustus     Restorative Tech Visit Date: 08/28/24  Note Type: Mobility  Patient Position Upon Consult: Bedside chair  Activity Performed: Transferred  Assistive Device: Other (Comment) (HHA x 2)  Patient Position at End of Consult: All needs within reach; Bedside chair

## 2024-08-28 NOTE — ASSESSMENT & PLAN NOTE
Baseline Cr 1-1.2  Developed FINA 8/23 with associated hyperkalemia    Plan:  Nephrology following, appreciate their recommendations  Maintain avilez  Avoid nephrotoxins  Strict I/Os  Trend renal function  Start PO diuresis today

## 2024-08-28 NOTE — ASSESSMENT & PLAN NOTE
Patient with prior admission with MSSA bacteremia on 7/28 treated with 7 days of antibiotics  Etiology likely 2/2 right foot ulcer  Complicated by cervical discitis  TTE without evidence of endocarditis    Plan:  ID consulted, appreciate their recommendations  Plan for ancef x 6 weeks  Repeat blood cultures sent given episodes of diaphoresis/chills  8/23 Blood cultures: no growth to date  MRSA swab negative  UA with micro unremarkable for infection   Consider LUCRECIA given poor visualization of valves  MRI - Discitis osteomyelitis again demonstrated at C5-C6   Pending discussions for pericardial drainage

## 2024-08-28 NOTE — ASSESSMENT & PLAN NOTE
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 3 mm epidural phlegmon/small abscess is also suspected. There is moderate resultant central stenosis and mild mass effect on the cord  8/27 MRI - Discitis osteomyelitis again demonstrated at C5-C6   Neurosurgery consulted   Upright xrays completed  Recommending wearing of C-collar which is currently in place  Recommending 6 weeks IV antibiotics and follow-up repeat imaging  Q4h neuro checks

## 2024-08-28 NOTE — INCIDENTAL FINDINGS
The following findings require follow up:  Radiographic finding   Finding: Complex exophytic right renal cystic lesion, incompletely assessed by noncontrast CT. Nonemergent follow-up gadolinium enhanced MRI of the abdomen is recommended for further evaluation.     Follow up should be done within 1 month(s)    Please notify the following clinician to assist with the follow up:   Dr. Gwen Lazo, DO

## 2024-08-28 NOTE — RESTORATIVE TECHNICIAN NOTE
Restorative Technician Note      Patient Name: Augustus OSBORN Augustus     Restorative Tech Visit Date: 08/28/24  Note Type: Mobility  Patient Position Upon Consult: Bedside chair  Activity Performed: Transferred  Assistive Device: Other (Comment) (HHA)  Patient Position at End of Consult: Bedside chair; All needs within reach

## 2024-08-28 NOTE — ASSESSMENT & PLAN NOTE
Last BM Date: 08/20/24  Bowel regimen: Senna/colace BID, MiraLAX daily  Give second dose of MiraLAX in afternoon vs dose lactulose

## 2024-08-28 NOTE — PHYSICAL THERAPY NOTE
PHYSICAL THERAPY NOTE          Patient Name: Augustus Coffman  Today's Date: 8/28/2024 08/28/24 1208   PT Last Visit   PT Visit Date 08/28/24   Note Type   Note Type Treatment   Pain Assessment   Pain Assessment Tool 0-10   Pain Score No Pain   Restrictions/Precautions   Weight Bearing Precautions Per Order Yes   RLE Weight Bearing Per Order WBAT   Braces or Orthoses Other (Comment);C/S Collar  ((R) sx shoe and pt's own (L) shoe)   Other Precautions Multiple lines;Telemetry;Fall Risk;Pain;Spinal precautions;O2   General   Chart Reviewed Yes   Family/Caregiver Present Yes   Cognition   Overall Cognitive Status WFL   Arousal/Participation Alert;Cooperative   Attention Attends with cues to redirect   Orientation Level Oriented X4   Memory Decreased recall of precautions   Following Commands Follows one step commands without difficulty   Comments Patient is pleasant and cooperative   Subjective   Subjective Patient agreeable to PT tx   Bed Mobility   Additional Comments OOB in chair on arrival   Transfers   Sit to Stand 4  Minimal assistance   Additional items Assist x 1;Increased time required;Verbal cues   Stand to Sit 4  Minimal assistance   Additional items Assist x 1;Increased time required;Verbal cues   Additional Comments RW - Ax another for line management -- x4 STS   Ambulation/Elevation   Gait pattern Decreased foot clearance;Excessively slow;Short stride   Gait Assistance 4  Minimal assist   Additional items Assist x 1;Verbal cues  (+ another for line management)   Assistive Device Rolling walker   Distance 10'x3  (x1 standing rest and x1 seated rest - (30' total))   Ambulation/Elevation Additional Comments Ax another for line management -- all vitals WFL throughout session -- patient c/o SOB, but SPO2 maintains >93% on 6LO2 throughout - ?anxiety   Balance   Static Sitting Fair   Dynamic Sitting Fair -   Static Standing  Fair -   Dynamic Standing Poor +   Ambulatory Poor +   Endurance Deficit   Endurance Deficit Yes   Endurance Deficit Description fatigue, weakness   Activity Tolerance   Activity Tolerance Patient tolerated treatment well;Patient limited by fatigue   Nurse Made Aware RN cleared   Medical Staff Made Aware OT   Assessment   Prognosis Good   Problem List Decreased strength;Decreased endurance;Impaired balance;Decreased mobility;Orthopedic restrictions;Pain   Assessment Patient received in bedside chair. Patient agreeable to therapy. Patient performs functional transfers with minimal assistance x1 using rolling walker. Patient performs x4 STS throughout session. Patient requires increased time throughout session for standing rest periods and deep breathing activities.  Patient performs ambulation with minimal assistance x1 using rolling walker, 10'x3 -- x1 standing rest period and x1 seated rest period.  Patient c/o SOB following ambulation, SPO2 maintaining >93% throughout on 6LO2 via NC. Patient left in bedside chair with all needs met and call bell/personal items within reach. The patient's AM-PAC Basic Mobility Inpatient Short Form Raw Score is 16 showing further need for skilled PT services in order to improve functional mobility, decrease need for assistance, and return to PLOF. PT is recommending Level 1 - Maximum Resource Intensity on d/c from hospital.  Will continue to follow as able.   Barriers to Discharge Inaccessible home environment   Goals   Patient Goals to continue improving   PT Treatment Day 2   Plan   Treatment/Interventions Functional transfer training;LE strengthening/ROM;Therapeutic exercise;Endurance training;Bed mobility;Gait training;Spoke to nursing;Spoke to case management   Progress Progressing toward goals   PT Frequency 3-5x/wk   Discharge Recommendation   Rehab Resource Intensity Level, PT I (Maximum Resource Intensity)   Equipment Recommended Walker   Walker Package Recommended Wheeled  walker   AM-PAC Basic Mobility Inpatient   Turning in Flat Bed Without Bedrails 3   Lying on Back to Sitting on Edge of Flat Bed Without Bedrails 3   Moving Bed to Chair 3   Standing Up From Chair Using Arms 3   Walk in Room 2   Climb 3-5 Stairs With Railing 2   Basic Mobility Inpatient Raw Score 16   Basic Mobility Standardized Score 38.32   [facility] Highest Level Of Mobility   -[facility] Goal 5: Stand one or more mins   -HLM Achieved 6: Walk 10 steps or more   End of Consult   Patient Position at End of Consult All needs within reach;Bedside chair     Adeola Jesus, PT, DPT

## 2024-08-28 NOTE — PROGRESS NOTES
Critical Care Interval Transfer Note:    Brief Hospital Summary:   Hospital Course: 8/20 Presented to [place] ED after outpatient MRI revealed cervical discitis with potential phlegmon vs small epidural abscess. Patient had prior admission with MSSA bacteremia. Repeat blood cultures obtained. Neurosurgery consulted - recommended C-collar.     8/21 Blood cultures positive for MSSA. ID consulted.     8/22 New onset afib with diffuse ST elevations. Echocardiogram revealed moderate pericardial effusion.     8/23 Developed FINA and hyperkalemia. Nephrology consulted. Patient with worsening tachycardia and hypotension. He was transferred to ICU - [facility] and jose placed. Afib treated with amiodarone bolus and infusion. Transferred to [facility] for further treatment. Hyperkalemia treated with aggressive medical therapy. Worsening hypotension requiring levophed and vasopressin.     8/24 Continued hyperkalemia - eventual improvement with lokelma and bumex infusion. Weaned off vasopressors and converted to NSR.     8/25 Transitioned off bumex infusion. Converted back to afib. Neurosurgery recommending C-collar given changes seen on upright xrays.       Patient continued on amiodarone infusion and eventually converted back to sinus rhythm.  Creatinine continue to improve with diuretics.  Antibiotics continued and IR consult pending for tunneled central line.      Barriers to discharge:   IR consult for tunneled line  IV antibiotics  Amiodarone management per cardiology  Management of renal failure     Consults: IP CONSULT TO CASE MANAGEMENT  IP CONSULT TO CARDIOLOGY  IP CONSULT TO THORACIC SURGERY  IP CONSULT TO NEPHROLOGY  IP CONSULT TO INFECTIOUS DISEASES  IP CONSULT TO NEUROSURGERY  IP CONSULT TO PODIATRY  IP CONSULT TO PHYSICAL MEDICINE REHAB  INPATIENT CONSULT TO IR    Recommended to review admission imaging for incidental findings and document in discharge navigator: Incidental findings have been documented in discharge  navigator.      Discharge Plan: Anticipate discharge in 48 hrs to rehab facility.  Villa Plan: Villa to be removed. Order has been placed  Central access plan: Medications requiring central line    Patient seen and evaluated by Critical Care today and deemed to be appropriate for transfer to Med Surg with Telemetry. Spoke to Dr. Paredes from [facility] to accept transfer. Critical care can be contacted via [employer] with any questions or concerns.

## 2024-08-28 NOTE — ASSESSMENT & PLAN NOTE
Lab Results   Component Value Date    HGBA1C 6.8 (H) 07/05/2024       Recent Labs     08/27/24 2052 08/27/24 2201 08/28/24  0000 08/28/24  0146   POCGLU 123 118 97 109         Blood Sugar Average: Last 72 hrs:  (P) [id number]

## 2024-08-28 NOTE — PLAN OF CARE
Problem: OCCUPATIONAL THERAPY ADULT  Goal: Performs self-care activities at highest level of function for planned discharge setting.  See evaluation for individualized goals.  Description: Treatment Interventions: ADL retraining, Functional transfer training, Endurance training, Patient/family training, Equipment evaluation/education, Continued evaluation, Energy conservation, Compensatory technique education          See flowsheet documentation for full assessment, interventions and recommendations.   Outcome: Progressing  Note: Limitation: Decreased ADL status, Decreased Safe judgement during ADL, Decreased endurance, Decreased self-care trans, Decreased high-level ADLs  Prognosis: Fair  Assessment: Pt seen for skilled OT treatment session on this date w/ interventions focusing on ADL participation, activity tolerance, standing tolerance, standing balance, transfer skills, and fxnl mobility. Pt was agreeable and willing to participate in session. Pt engaged in the following tasks: Max A LB dressing; Min Ax1 STS, functional mobility. In comparison to previous session, pt demonstrated improvements in functional mobility and standing tolerance. HR, BP, SpO2 WNL t/o session. Pt required verbal cues for correct technique, frequent rest periods, and ocassional safety reminders. Pt continues to be functioning below baseline level as occupational performance remains limited by decreased ADL status, decreased activity tolerance, decreased endurance, decreased standing tolerance, decreased standing balance, decreased transfer skills, decreased fxnl mobility, pain, and WBS restrictions. The patient's raw score on the AM-PAC Daily Activity Inpatient Short Form is 16. A raw score of less than 19 suggests the patient may benefit from discharge to post-acute rehabilitation services. Please refer to the recommendation of the Occupational Therapist for safe discharge planning.   From OT standpoint, recommend Level II (Moderate  Resource Intensity) at time of d/c. Pt will benefit from continued OT treatment while in acute care to address deficits as defined above and maximize level of functional independence with ADLs and functional mobility. Pt left OOB in chair w/ all needs within reach at end of session.     Rehab Resource Intensity Level, OT: II (Moderate Resource Intensity)

## 2024-08-28 NOTE — ASSESSMENT & PLAN NOTE
Developed new onset afib 8/21/8/22    Plan:  Amiodarone infusion 0.5 mg/hr   Amiodarone 200 mg BID  Metoprolol 25 mg BID -can uptitrate as BP tolerates   Continue heparin drip  Telemetry monitoring

## 2024-08-28 NOTE — PLAN OF CARE
Problem: PHYSICAL THERAPY ADULT  Goal: Performs mobility at highest level of function for planned discharge setting.  See evaluation for individualized goals.  Description: Treatment/Interventions: Functional transfer training, LE strengthening/ROM, Elevations, Therapeutic exercise, Endurance training, Equipment eval/education, Bed mobility, Gait training, Spoke to nursing, Spoke to case management, OT          See flowsheet documentation for full assessment, interventions and recommendations.  Outcome: Progressing  Note: Prognosis: Good  Problem List: Decreased strength, Decreased endurance, Impaired balance, Decreased mobility, Orthopedic restrictions, Pain  Assessment: Patient received in bedside chair. Patient agreeable to therapy. Patient performs functional transfers with minimal assistance x1 using rolling walker. Patient performs x4 STS throughout session. Patient requires increased time throughout session for standing rest periods and deep breathing activities.  Patient performs ambulation with minimal assistance x1 using rolling walker, 10'x3 -- x1 standing rest period and x1 seated rest period.  Patient c/o SOB following ambulation, SPO2 maintaining >93% throughout on 6LO2 via NC. Patient left in bedside chair with all needs met and call bell/personal items within reach. The patient's AM-PAC Basic Mobility Inpatient Short Form Raw Score is 16 showing further need for skilled PT services in order to improve functional mobility, decrease need for assistance, and return to PLOF. PT is recommending Level 1 - Maximum Resource Intensity on d/c from hospital.  Will continue to follow as able.  Barriers to Discharge: Inaccessible home environment     Rehab Resource Intensity Level, PT: I (Maximum Resource Intensity)    See flowsheet documentation for full assessment.

## 2024-08-28 NOTE — PROGRESS NOTES
[facility]  Progress Note  Name: Augustus Coffman I  MRN: [id number]  Unit/Bed#: PPHP-310-01 I Date of Admission: 8/23/2024   Date of Service: 8/28/2024 I Hospital Day: 5    Assessment & Plan   * MSSA bacteremia  Assessment & Plan  Patient with prior admission with MSSA bacteremia on 7/28 treated with 7 days of antibiotics  Etiology likely 2/2 right foot ulcer  Complicated by cervical discitis  TTE without evidence of endocarditis    Plan:  ID consulted, appreciate their recommendations  Plan for ancef x 6 weeks  Repeat blood cultures sent given episodes of diaphoresis/chills  8/23 Blood cultures: no growth to date  MRSA swab negative  UA with micro unremarkable for infection   Consider LUCRECIA given poor visualization of valves  MRI - Discitis osteomyelitis again demonstrated at C5-C6   Pending discussions for pericardial drainage    Hypotension-resolved as of 8/25/2024  Assessment & Plan  Developed new onset hypotension in the setting of afib with RVR  Weaned off levophed/vasopressin 8/24    New onset a-fib (HCC)  Assessment & Plan  Developed new onset afib 8/21/8/22    Plan:  Amiodarone infusion 0.5 mg/hr   Amiodarone 200 mg BID  Metoprolol 25 mg BID -can uptitrate as BP tolerates   Continue heparin drip  Telemetry monitoring      Acute pericarditis  Assessment & Plan  8/22 Echo: Limited echo for assessment of endocarditis.  The patient's aortic valve is difficult to assess due to calcification but there is no new significant regurgitation.  The mitral valve has hyperechoic thickening at the tips consistent with most likely calcification, these were seen on the echocardiograms in July.  The tricuspid valve similarly was not well-visualized but no new regurgitation.  The pulmonary valve was not well-visualized. Off note patient has new moderate pericardial effusion without specific echocardiographic signs of tamponade  Patient with symptoms consistent with pericarditis  8/27 TTE  - EF 50%, mod focal calcification mitral valve, moderate circumferential pericardial effusion, fluid with fibrinous appearance, no sign of tamponade       Plan:  Cardiology consulted, no plans for drainage   Discuss window with thoracic surgery  Colchicine and Prednisone discontinued 8/27  Close hemodynamic monitoring    Acute respiratory insufficiency  Assessment & Plan  Weaned to 4- 6 L nasal cannula from [facility]  Appears related to low lung volumes 2/2 pain  Wean O2 as tolerated to maintain Spo2 > 92%  IS, OOB    FINA (acute kidney injury) on CKD stage 2([facility])  Assessment & Plan  Baseline Cr 1-1.2  Developed FINA 8/23 with associated hyperkalemia    Plan:  Nephrology following, appreciate their recommendations  Maintain avilez  Avoid nephrotoxins  Strict I/Os  Trend renal function  Start PO diuresis today    Acute neck pain  Assessment & Plan  Tylenol 975 mg PO q6h  Oxycodone 5 mg/10 mg q4h PRN   Robaxin 750 mg every 6 hrs  Dilaudid 0.5 mg IV q3h PRN for breakthrough pain    Acute osteomyelitis of cervical spine (HCC)  Assessment & Plan  8/20 MRI C-Spine: Imaging findings suspicious for discitis osteomyelitis at C5-C6. 3 mm epidural phlegmon/small abscess is also suspected. There is moderate resultant central stenosis and mild mass effect on the cord  8/27 MRI - Discitis osteomyelitis again demonstrated at C5-C6   Neurosurgery consulted   Upright xrays completed  Recommending wearing of C-collar which is currently in place  Recommending 6 weeks IV antibiotics and follow-up repeat imaging  Q4h neuro checks    Hyperkalemia-resolved as of 8/27/2024  Assessment & Plan  Developed FINA with hyperkalemia  Received medical therapy with insulin, calcium, bicarb, lasix, lokelma  Trending down currently with diuresis    Diabetic ulcer of right foot (HCC)  Assessment & Plan  Follows as an outpatient with wound care  Likely source of MSSA bacteremia   Continue local wound care  Podiatry consulted  WBAT    Type 2 diabetes mellitus  (HCC)  Assessment & Plan  Lab Results   Component Value Date    HGBA1C 6.8 (H) 07/05/2024       Recent Labs     08/27/24 2052 08/27/24 2201 08/28/24  0000 08/28/24  0146   POCGLU 123 118 97 109         Blood Sugar Average: Last 72 hrs:  (P) [id number]  Home regimen: metformin  Insulin infusion for better glucose control   Goal blood glucose 140-180  Transition back to SSI once tolerating PO diet    History of hypertension  Assessment & Plan  Hx HTN on norvasc, HCTZ, losartan, Toprol-XL-all initially on hold secondary to hypotension  Restarted on Metoprolol 25 mg PO BID  Statin on hold 2/2 transaminitis  Aspirin on hold - discuss restarting    Diabetic peripheral neuropathy (HCC)  Assessment & Plan  Lab Results   Component Value Date    HGBA1C 6.8 (H) 07/05/2024       Recent Labs     08/27/24 2052 08/27/24 2201 08/28/24  0000 08/28/24  0146   POCGLU 123 118 97 109         Blood Sugar Average: Last 72 hrs:  (P) 139.9971855075039373      Constipation  Assessment & Plan  Last BM Date: 08/20/24  Bowel regimen: Senna/colace BID, MiraLAX daily  Give second dose of MiraLAX in afternoon vs dose lactulose     Transaminitis  Assessment & Plan  Likely 2/2 poor perfusion/hypotension  Hold statin  Trend             Disposition: Stepdown Level 1    ICU Core Measures     A: Assess, Prevent, and Manage Pain Has pain been assessed? Yes  Need for changes to pain regimen? No   B: Both SAT/SAT  N/A   C: Choice of Sedation RASS Goal: N/A patient not on sedation  Need for changes to sedation or analgesia regimen? NA   D: Delirium CAM-ICU: Negative   E: Early Mobility  Plan for early mobility? Yes   F: Family Engagement Plan for family engagement today? Yes       Antibiotic Review: Patient on appropriate coverage based on culture data.  and Infectious disease consulted    Review of Invasive Devices:    Villa Plan: Villa to be removed. Order has been placed  Central access plan: Medications requiring central  line      Prophylaxis:  VTE VTE covered by:  heparin (porcine), Intravenous, 17.1 Units/kg/hr at 08/28/24 0411  heparin (porcine), Intravenous, 2,000 Units at 08/27/24 2353  heparin (porcine), Intravenous, 4,000 Units at 08/27/24 1747       Stress Ulcer  not ordered         Significant 24hr Events     24hr events: Rapid A fib/flutter yesterday with associated hypotension. Received 150 mg IV amiodarone bolus and started on infusion. Remains in rate controlled A fib.     Reports appetite improving, still no bowl movement since 8/20 but denies abdominal pain, distension or nausea.      Subjective   Review of Systems: See HPI for Review of Systems     Objective                            Vitals I/O      Most Recent Min/Max in 24hrs   Temp (!) 97.2 °F (36.2 °C) Temp  Min: 97.2 °F (36.2 °C)  Max: 97.5 °F (36.4 °C)   Pulse (!) 112 Pulse  Min: 107  Max: 130   Resp 19 Resp  Min: 16  Max: 34   /76 BP  Min: 84/61  Max: 116/69   O2 Sat 97 % SpO2  Min: 95 %  Max: 98 %      Intake/Output Summary (Last 24 hours) at 8/28/2024 0506  Last data filed at 8/28/2024 0223  Gross per 24 hour   Intake 1854.59 ml   Output 3275 ml   Net -1420.41 ml       Diet Cardiovascular; Cardiac; Consistent Carbohydrate Diet Level 3 (6 carb servings/90 grams CHO/meal), Fluid Restriction 2000 ML         Physical Exam   Physical Exam  Skin:     General: Skin is warm and dry.   HENT:      Head: Normocephalic.      Mouth/Throat:      Mouth: Mucous membranes are moist.   Neck:      Vascular: Central line present.   Cardiovascular:      Rate and Rhythm: Tachycardia present. Rhythm irregular.   Abdominal: General: There is no distension.      Palpations: Abdomen is soft.      Tenderness: There is no abdominal tenderness.   Constitutional:       General: He is awake. He is not in acute distress.     Appearance: He is well-developed. He is ill-appearing.      Interventions: Cervical collar and nasal cannula in place.   Pulmonary:      Effort: Pulmonary  effort is normal.      Breath sounds: Normal breath sounds.   Neurological:      General: No focal deficit present.      Mental Status: He is alert and oriented to person, place and time.      Motor: Strength full and intact in all extremities.   Genitourinary/Anorectal:  Villa present.          Medications:  Scheduled PRN   acetaminophen, 975 mg, Q6H SRIDHAR  amiodarone, 200 mg, BID With Meals  bumetanide, 2 mg, Daily  cefazolin, 2,000 mg, Q6H  chlorhexidine, 15 mL, Q12H SRIDHAR  methocarbamol, 750 mg, Q6H SRIDHAR  metoprolol tartrate, 25 mg, Q12H SRIDHAR  polyethylene glycol, 17 g, Daily  potassium chloride, 40 mEq, Daily  predniSONE, 20 mg, Daily   Followed by  [START ON 8/29/2024] predniSONE, 10 mg, Daily  senna-docusate sodium, 2 tablet, BID      calcium carbonate, 1,000 mg, Daily PRN  heparin (porcine), 2,000 Units, Q6H PRN  heparin (porcine), 4,000 Units, Q6H PRN  HYDROmorphone, 0.5 mg, Q3H PRN  ondansetron, 4 mg, Q6H PRN  oxyCODONE, 10 mg, Q4H PRN  oxyCODONE, 5 mg, Q4H PRN       Continuous    amiodarone (CORDARONE) 900 mg in dextrose 5 % 500 mL infusion, 0.5 mg/min, Last Rate: 0.5 mg/min (08/27/24 1931)  heparin (porcine), 3-20 Units/kg/hr (Order-Specific), Last Rate: 17.1 Units/kg/hr (08/28/24 0411)  insulin regular (HumuLIN R,NovoLIN R) 1 Units/mL in sodium chloride 0.9 % 100 mL infusion, 0.3-21 Units/hr, Last Rate: 4 Units/hr (08/28/24 0425)         Labs:    CBC    Recent Labs     08/27/24  0500 08/28/24  0500   WBC 11.51* 12.78*   HGB 10.9* 11.4*   HCT 34.6* 37.6   * 450*     BMP    Recent Labs     08/27/24  0444 08/27/24  1340   SODIUM 136 136   K 3.2* 4.0   CL 89* 91*   CO2 33* 32   AGAP 14* 13   BUN 90* 90*   CREATININE 2.59* 2.24*   CALCIUM 9.3 9.4       Coags    Recent Labs     08/27/24  1626 08/27/24  2313   PTT 34 55*        Additional Electrolytes  Recent Labs     08/26/24  0508 08/27/24  0427 08/27/24  0444   MG 2.3  --  2.1   PHOS  --   --  4.0   CAIONIZED  --  1.12  --           Blood Gas    No recent  results  No recent results LFTs  Recent Labs     08/26/24  0508 08/27/24  0444   ALT 33 15   * 257*   ALKPHOS 302* 268*   ALB 3.3* 3.2*   TBILI 0.45 0.45       Infectious  No recent results  Glucose  Recent Labs     08/26/24  0508 08/27/24  0444 08/27/24  1340   GLUC 124 135 235*               Amy Franco PA-C

## 2024-08-28 NOTE — PROGRESS NOTES
Follow up Consultation    Nephrology   Augustus Coffman 68 y.o. male MRN: [id number]  Unit/Bed#: [facility] 505-01 Encounter: [id number]      Physician Requesting Consult: Tom Andre MD        ASSESSMENT:  68-year-old male with multiple comorbidities including diabetes, follicular lymphoma and recent MSSA bacteremia presented with abnormal MRI cervical spine as an outpatient.  Nephrology following for acute kidney injury.      Acute kidney injury :   FINA most likely secondary to ischemic injury secondary to ischemic injury secondary to hemodynamic perturbations plus new onset A-fib plus septic shock with subsequent ATN.  After review of records it appears that the patient has a baseline Creatinine of 0.9-1.1 mg/dL.  Admission creatinine of 1.36 mg/dL on 8/20/2024.  Creatinine today is at 1.66 mg/dL, stable and improving.  Dialysis consent in chart in case needed per my prior colleague  Monitor for dialysis needs  Hold diuretics for now if worsening volume status may give as needed Bumex  Will need to see where new baseline creatinine will be in a period of 3 months  check BMP, magnesium, phosphorus in a.m.  Await renal recovery.  Place on a renal diet when allowed diet order.   Strict I/O.  Daily weights  Urinary retention protocol  Avoid nephrotoxins, adjust meds to appropriate GFR.    H&H/anemia:  most recent hemoglobin at 11.5 g/dL  Maintain hemoglobin greater than 8 grams/deciliter    Acid-base electrolytes:  Hyponatremia: Restrict 1.2 L/day.  Most recent sodium stable 136 mill equivalents.  Resolved  Hyperkalemia: Most recent potassium 3.6mEq stable resolved.  On K-Janiya 40 mill equivalents cautious supplementation as diuretics now on hold    Blood pressure/A-fib/volume overload:   Optimize hemodynamic status to avoid delay in renal recovery.  Maintain MAP > 65mmHg  Avoid BP fluctuations.  Current medications:  metoprolol 25 mg p.o. every 12  Hold diuretics for now may give as needed Bumex if worsening volume  "status or decreased urine output    Other medical problems:  MSSA bacteremia:  Management per primary team.  In the setting of right foot ulcer concern for cervical discitis cervical osteomyelitis with paraspinal phlegmon/abscess.  On Ancef for total of 6 weeks  Pericardial effusion: Management per primary team follow-up with cardiology colchicine and prednisone discontinued   A-fib with RVR: Follow-up with cardiology, on amiodarone and heparin    Plan below is what was discussed with the primary team that they agree with:  check BMP, magnesium, phosphorus in a.m.  Hold Bumex for now may give as needed diuretics if worsening volume status.  Cautious potassium supplementation  No acute indication for dialysis at this time  Volume status stable no changes continue to monitor for today see if needs diuretics tomorrow      Thanks for the consult  Will continue to follow  Please call with questions/ concerns.      Yokasta Flores MD, [facility], 2024, 11:26 AM                Objective :   Patient seen and examined in his room no overnight events hemodynamically stable remains afebrile, urine output 2.6 L happy renal parameters continue to improve      PHYSICAL EXAM  /92   Pulse 69   Temp (!) 97.4 °F (36.3 °C)   Resp 17   Ht 6' 3\" (1.905 m)   Wt 113 kg (248 lb 10.9 oz)   SpO2 99%   BMI 31.08 kg/m²   Temp (24hrs), Av.3 °F (36.3 °C), Min:97.1 °F (36.2 °C), Max:97.5 °F (36.4 °C)        Intake/Output Summary (Last 24 hours) at 2024 1126  Last data filed at 2024 0800  Gross per 24 hour   Intake 915.14 ml   Output 2715 ml   Net -1799.86 ml       I/O last 24 hours:  In: 1395.3 [P.O.:900; I.V.:495.3]  Out: 3170 [Urine:3170]      Current Weight: Weight - Scale: 113 kg (248 lb 10.9 oz)  First Weight: Weight - Scale: 121 kg (267 lb 13.7 oz)  Physical Exam  Vitals and nursing note reviewed.   Constitutional:       General: He is not in acute distress.     Appearance: Normal appearance. He is obese. He is " not ill-appearing, toxic-appearing or diaphoretic.   HENT:      Head: Normocephalic and atraumatic.      Mouth/Throat:      Pharynx: No oropharyngeal exudate.   Eyes:      General: No scleral icterus.  Neck:      Comments: Neck collar in place  Cardiovascular:      Rate and Rhythm: Normal rate.   Pulmonary:      Effort: No respiratory distress.      Breath sounds: No stridor. No wheezing.   Abdominal:      General: There is no distension.      Palpations: Abdomen is soft.      Tenderness: There is no abdominal tenderness.   Musculoskeletal:         General: No swelling.   Skin:     Coloration: Skin is not jaundiced.   Neurological:      General: No focal deficit present.      Mental Status: He is alert and oriented to person, place, and time. Mental status is at baseline.   Psychiatric:         Mood and Affect: Mood normal.         Behavior: Behavior normal.             Review of Systems   Constitutional:  Negative for chills and fever.   HENT:  Negative for congestion.    Respiratory:  Negative for cough and shortness of breath.    Cardiovascular:  Negative for leg swelling.   Gastrointestinal:  Negative for diarrhea.   Genitourinary:  Negative for decreased urine volume.   Musculoskeletal:  Negative for back pain.   Neurological:  Negative for headaches.   Psychiatric/Behavioral:  Negative for agitation and confusion.    All other systems reviewed and are negative.      Scheduled Meds:  Current Facility-Administered Medications   Medication Dose Route Frequency Provider Last Rate    acetaminophen  975 mg Oral Q6H SRIDHAR Ophelia Leon PA-C      amiodarone (CORDARONE) 900 mg in dextrose 5 % 500 mL infusion  0.5 mg/min Intravenous Continuous TANVIR Go 0.5 mg/min (08/28/24 1420)    amiodarone  200 mg Oral BID With Meals Ophelia Leon PA-C      apixaban  5 mg Oral BID Ophelia Leon PA-C      calcium carbonate  1,000 mg Oral Daily PRN Breonna Santana PA-C      cefazolin  2,000 mg Intravenous Q6H Analisa  MD Lucie 2,000 mg (08/29/24 0605)    chlorhexidine  15 mL Mouth/Throat Q12H SRIDHAR Breonna Santana PA-C      HYDROmorphone  0.5 mg Intravenous Q3H PRN Breonna Santana PA-C      insulin lispro  1-5 Units Subcutaneous TID AC Ophelia Leon PA-C      insulin lispro  1-5 Units Subcutaneous HS Ophelia Leon PA-C      methocarbamol  750 mg Oral Q6H SRIDHAR Breonna Santana PA-C      metoprolol tartrate  25 mg Oral Q12H SRIDHAR TANVIR Go      ondansetron  4 mg Intravenous Q6H PRN Breonna Santana PA-C      oxyCODONE  10 mg Oral Q4H PRN Breonna Santana PA-C      oxyCODONE  5 mg Oral Q4H PRN Breonna Santana PA-C      polyethylene glycol  17 g Oral Daily Amy Franco PA-C      potassium chloride  40 mEq Oral Daily TANVIR Go      predniSONE  10 mg Oral Daily TANVIR Go      senna-docusate sodium  2 tablet Oral BID Amy Franco PA-C         PRN Meds:.  calcium carbonate    HYDROmorphone    ondansetron    oxyCODONE    oxyCODONE    Continuous Infusions:amiodarone (CORDARONE) 900 mg in dextrose 5 % 500 mL infusion, 0.5 mg/min, Last Rate: 0.5 mg/min (08/28/24 1420)          Invasive Devices:     Invasive Devices       Central Venous Catheter Line  Duration             CVC Central Lines 08/23/24 Triple 20cm 5 days    CVC Central Lines 08/29/24 Right Other (Comment) <1 day                      LABORATORY:    Results from last 7 days   Lab Units 08/29/24  0414 08/28/24  0500 08/28/24  0000 08/27/24  1340 08/27/24  0500 08/27/24  0444 08/26/24  0508 08/26/24  0455 08/25/24  1629 08/25/24  1009 08/25/24  0406 08/25/24  0405 08/24/24  2203 08/24/24  1551 08/24/24  1003 08/24/24  0620 08/24/24  0403 08/24/24  0201 08/23/24  2155 08/23/24  1939   WBC Thousand/uL 15.12* 12.78*  --   --  11.51*  --   --  14.22*  --   --   --  17.18*  --   --   --   --  22.20*  --   --  28.03*   HEMOGLOBIN g/dL 11.5* 11.4*  --   --  10.9*  --   --  11.2*  --   --   --  10.2*  --   --   --   --  10.4*  --    --  10.5*   HEMATOCRIT % 38.1 37.6  --   --  34.6*  --   --  36.9  --   --   --  33.4*  --   --   --   --  34.2*  --   --  34.6*   PLATELETS Thousands/uL 415* 450*  --   --  432*  --   --  434*  --   --   --  366  --   --   --   --  383  --   --  430*   POTASSIUM mmol/L 3.6  --  3.5 4.0  --  3.2* 4.0  --  4.3 4.5   < >  --    < > 5.3 5.5*   < >  --  6.0*   < > 5.7*   CHLORIDE mmol/L 91*  --  93* 91*  --  89* 91*  --  91* 93*   < >  --    < > 94* 96   < >  --  93*   < > 92*   CO2 mmol/L 33*  --  32 32  --  33* 30  --  28 27   < >  --    < > 24 22   < >  --  23   < > 25   BUN mg/dL 91*  --  87* 90*  --  90* 70*  --  66* 63*   < >  --    < > 55* 54*   < >  --  52*   < > 46*   CREATININE mg/dL 1.66*  --  1.83* 2.24*  --  2.59* 2.73*  --  3.05* 3.13*   < >  --    < > 3.33* 3.06*   < >  --  3.04*   < > 2.92*   CALCIUM mg/dL 9.8  --  9.5 9.4  --  9.3 9.7  --  9.4 9.3   < >  --    < > 9.4 8.8   < >  --  9.4   < > 10.0   MAGNESIUM mg/dL 2.0  --  2.2  --   --  2.1 2.3  --   --   --   --  2.2  --  2.5 2.3  --   --  2.4   < > 2.4   PHOSPHORUS mg/dL 2.6  --  2.8  --   --  4.0  --   --   --   --   --  7.1*  --  7.7* 7.4*  --   --  8.1*   < > 7.2*    < > = values in this interval not displayed.      rest all reviewed    RADIOLOGY:  MRI lumbar spine wo contrast   Final Result by Duc Justice MD (08/27 0748)      No discitis osteomyelitis involving the thoracic spine. Minimal thoracic spondylosis, as described above.      No discitis osteomyelitis involving the lumbar spine.      Multilevel lumbar spondylosis, as described above, contributing to at most moderate canal stenosis, right greater than left lateral recess stenosis, moderate to severe right and mild left neural foraminal stenosis at L4-L5.      Discitis osteomyelitis again demonstrated at C5-C6, on the localizer/sagittal vertebral counting images.      Right pleural effusion.      Workstation performed: [id number]         MRI thoracic spine wo contrast   Final Result by  Duc Justice MD (08/27 0797)      No discitis osteomyelitis involving the thoracic spine. Minimal thoracic spondylosis, as described above.      No discitis osteomyelitis involving the lumbar spine.      Multilevel lumbar spondylosis, as described above, contributing to at most moderate canal stenosis, right greater than left lateral recess stenosis, moderate to severe right and mild left neural foraminal stenosis at L4-L5.      Discitis osteomyelitis again demonstrated at C5-C6, on the localizer/sagittal vertebral counting images.      Right pleural effusion.      Workstation performed: [id number]         CT chest abdomen pelvis wo contrast   Final Result by Oneil Desai MD (08/26 9373)   1.  New moderate nonspecific pericardial effusion and small bilateral pleural effusions. No other definitive manifestations of acute infection-allowing for the limitations of noncontrast CT   2.  Complex exophytic right renal cystic lesion, incompletely assessed by noncontrast CT. Nonemergent follow-up gadolinium enhanced MRI of the abdomen is recommended for further evaluation.      3.  Please refer to the report body for description of other incidental chronic and/or benign findings.               Workstation performed: [id number]         XR spine cervical 2 or 3 vw injury   Final Result by Bill Ferreira MD (08/26 0722)      Destruction of the C5 to space with collapse of the superior endplate of C6 consistent with discitis osteomyelitis which has progressed from the prior studies.      Increased soft tissue swelling anterior to C5-6 level.      Left central venous catheter terminating at the confluence of the innominate veins.         Workstation performed: [id number]         XR spine cervical 2 or 3 vw injury    (Results Pending)   IR tunneled central line placement    (Results Pending)     Rest all reviewed    Portions of the record may have been created with voice recognition software. Occasional wrong word or  "\"sound a like\" substitutions may have occurred due to the inherent limitations of voice recognition software. Read the chart carefully and recognize, using context, where substitutions have occurred.If you have any questions, please contact the dictating provider.    "

## 2024-08-28 NOTE — PLAN OF CARE
Problem: NEUROSENSORY - ADULT  Goal: Achieves stable or improved neurological status  Description: INTERVENTIONS  - Monitor and report changes in neurological status  - Monitor vital signs such as temperature, blood pressure, glucose, and any other labs ordered   - Initiate measures to prevent increased intracranial pressure  - Monitor for seizure activity and implement precautions if appropriate      Outcome: Progressing     Problem: CARDIOVASCULAR - ADULT  Goal: Maintains optimal cardiac output and hemodynamic stability  Description: INTERVENTIONS:  - Monitor I/O, vital signs and rhythm  - Monitor for S/S and trends of decreased cardiac output  - Administer and titrate ordered vasoactive medications to optimize hemodynamic stability  - Assess quality of pulses, skin color and temperature  - Assess for signs of decreased coronary artery perfusion  - Instruct patient to report change in severity of symptoms  Outcome: Progressing  Goal: Absence of cardiac dysrhythmias or at baseline rhythm  Description: INTERVENTIONS:  - Continuous cardiac monitoring, vital signs, obtain 12 lead EKG if ordered  - Administer antiarrhythmic and heart rate control medications as ordered  - Monitor electrolytes and administer replacement therapy as ordered  Outcome: Progressing     Problem: RESPIRATORY - ADULT  Goal: Achieves optimal ventilation and oxygenation  Description: INTERVENTIONS:  - Assess for changes in respiratory status  - Assess for changes in mentation and behavior  - Position to facilitate oxygenation and minimize respiratory effort  - Oxygen administered by appropriate delivery if ordered  - Initiate smoking cessation education as indicated  - Encourage broncho-pulmonary hygiene including cough, deep breathe, Incentive Spirometry  - Assess the need for suctioning and aspirate as needed  - Assess and instruct to report SOB or any respiratory difficulty  - Respiratory Therapy support as indicated  Outcome: Progressing      Problem: METABOLIC, FLUID AND ELECTROLYTES - ADULT  Goal: Electrolytes maintained within normal limits  Description: INTERVENTIONS:  - Monitor labs and assess patient for signs and symptoms of electrolyte imbalances  - Administer electrolyte replacement as ordered  - Monitor response to electrolyte replacements, including repeat lab results as appropriate  - Instruct patient on fluid and nutrition as appropriate  Outcome: Progressing  Goal: Fluid balance maintained  Description: INTERVENTIONS:  - Monitor labs   - Monitor I/O and WT  - Instruct patient on fluid and nutrition as appropriate  - Assess for signs & symptoms of volume excess or deficit  Outcome: Progressing  Goal: Glucose maintained within target range  Description: INTERVENTIONS:  - Monitor Blood Glucose as ordered  - Assess for signs and symptoms of hyperglycemia and hypoglycemia  - Administer ordered medications to maintain glucose within target range  - Assess nutritional intake and initiate nutrition service referral as needed  Outcome: Progressing     Problem: HEMATOLOGIC - ADULT  Goal: Maintains hematologic stability  Description: INTERVENTIONS  - Assess for signs and symptoms of bleeding or hemorrhage  - Monitor labs  - Administer supportive blood products/factors as ordered and appropriate  Outcome: Progressing     Problem: Nutrition/Hydration-ADULT  Goal: Nutrient/Hydration intake appropriate for improving, restoring or maintaining nutritional needs  Description: Monitor and assess patient's nutrition/hydration status for malnutrition. Collaborate with interdisciplinary team and initiate plan and interventions as ordered.  Monitor patient's weight and dietary intake as ordered or per policy. Utilize nutrition screening tool and intervene as necessary. Determine patient's food preferences and provide high-protein, high-caloric foods as appropriate.     INTERVENTIONS:  - Monitor oral intake, urinary output, labs, and treatment plans  - Assess  nutrition and hydration status and recommend course of action  - Evaluate amount of meals eaten  - Assist patient with eating if necessary   - Allow adequate time for meals  - Recommend/ encourage appropriate diets, oral nutritional supplements, and vitamin/mineral supplements  - Order, calculate, and assess calorie counts as needed  - Recommend, monitor, and adjust tube feedings and TPN/PPN based on assessed needs  - Assess need for intravenous fluids  - Provide specific nutrition/hydration education as appropriate  - Include patient/family/caregiver in decisions related to nutrition  Outcome: Progressing

## 2024-08-28 NOTE — PROGRESS NOTES
Progress Note - Infectious Disease   Augustus Coffman 68 y.o. male MRN: [id number]  Unit/Bed#: [facility]-310-01 Encounter: [id number]      Below plan and management is preliminary and to be discussed with attending with attestation to follow from Dr. Faulkner.      Impression/Plan:  1. Shock - Patient initially being treated at [facility] with IV antibiotics and course was complicated by hypotension in the setting of atrial fibrillation with RVR 8/21 for which he was started on amiodarone and transfer to [facility] for further management under ICU care.   Management of atrial fibrillation per primary team  Cardiology is following for #4 noted below  Has been off pressors since 8/24  MRI T-spine (and lumbar spine) performed 8/26. C5-C6 was commented on; however, without contrast it is difficult to appreciate meningeal enhancement as per discussion with reading room  Will discuss with primary team regarding MRI C-spine with contrast in coming days as patient's renal function recovers  IR has been consulted for tunneled cath for long-term abx use  Will need eventual LUCRECIA to rule out perivalvular complications  Patient remains under CC with plans to transition to [facility]  Cardiology, Podiatry, Nephrology, and Neurosurgery are following     2. MSSA bacteremia - Initially positive blood cultures for MSSA that have begun to clear. Initial source thought to be due to chronic right foot ulcer but given #4 there is also concern for purulent pericarditis verus endovascular/valvular complication  Most recent blood cx 8/23 with no growth at this time  Continue IV Ancef 2g q6H, can potentially lower dose pending MRI results as noted above  LUCRECIA has been ordered  Trend fever curve/WBC  Follow cultures     3. C5-C6 osteomyelitis/discitis with epidural phlegmon/abscess  Neurosurgery has evaluated this admission and is following  Can consider repeat MRI of cervical as noted above  Follow neuro exam for acute changes  Will likely need  prolonged antibiotic duration with outpatient labs/imaging follow-up     4. Pericardial effusion with potential pericarditis - Found after patient developed chest pain, diaphoresis with ST elevations on EKG, negative troponins, and new pericardial effusion on TTE. No tamponade physiology present. Cardiology has been following and patient remains on colchicine therapy  No plan for pericardial drainage per cardiology notes. Repeat TTE yesterday showed smaller volume of pericardial effusion and patient is currently too high-risk to consider fluid removal at this time due to risk of RV wall injury  Antibiotics as above  Recommend LUCRECIA as noted above. Team will consider timing pending clinical course and blood culture results  Management per Cardiology team  May need serial echocardiogram f/u along treatment course     5. FINA on CKD - Baseline creatinine 0.9-1.1. Nephrology folloiwng this admission, creatinine peaked at 3.3, now plateauing and downtrending  Nephrology continues to follow patient  Avoid contrast and other nephrotoxic agents  Will d/w primary and Nephro teams depending on patient needing   Antibiotic dosing to be adjusted as needed for renal impairment     6. Acute transaminitis - Likely in setting of acute infection, hypotension. RUQ US unremarkable. Labs appear to be downtrending.   Continue to follow CMP  Antibiotics as above     7. Chronic right foot ulcer with hardware present - Podiatry has been consulted without plans for acute intervention other than wound care and surgical shoe as ulcer is currently stable and not believed to be current source of bacteremia.      8. Type 2 Diabetes - Last A1c 6/8. Takes metformin at home. Glucose has been relatively controlled throughout this admission, remains on insulin gtt. Management per primary team.      9. Obesity - BMI 34. Continue current abx regimen for now, will adjust as needed.         ID consult service will continue to follow.    Antibiotics:  IV  Ancef D9    24 Hour events:  Yesterday and overnight notes reviewed. No acute events overnight. Patient to be transferred out of CCU today.     Subjective:  Patient has no fever, chills, sweats; no nausea, vomiting, diarrhea; no cough, shortness of breath; no pain. No new symptoms.    Interval History: 67yo male with history of T2DM c/b neuropathy and chronic R foot diabetic foot wound, HTN, CHF, stasis dermatitis of bilateral lower extremities, obesity, CKD, HLD, lymphoma now in remission. Patient had outpatient MRI for chronic neck pain that ultimately showed C5-C6 osteo/discitis with 3mm epidural abscess/phlegmon. Admission blood culture was positive and was treated with short course of IV abx. He was eventually discharged and returned to ER after unchanged cervical pathology with noted elevated creatinine and LFTs as well as positive blood cultures for MSSA. Hospital course c/b chest pain and diaphoresis accompanied by ST elevations and echo that showed pericardial effusion concerning for pericarditis. Course further c/b by afib with RVR, hypotension for which he is under CC management briefly requiring pressor support now off. Likely transition out of CCU .     Objective:  Vitals:  Temp:  [97.2 °F (36.2 °C)-97.7 °F (36.5 °C)] 97.7 °F (36.5 °C)  HR:  [] 74  Resp:  [15-34] 16  BP: ()/(50-76) 125/66  SpO2:  [95 %-98 %] 98 %  Temp (24hrs), Av.5 °F (36.4 °C), Min:97.2 °F (36.2 °C), Max:97.7 °F (36.5 °C)  Current: Temperature: 97.7 °F (36.5 °C)    Physical Exam:   General Appearance:  Alert, interactive, nontoxic, no acute distress.   Throat: Oropharynx moist without lesions. C-collar in place.    Lungs:   Clear to auscultation bilaterally; no wheezes, rhonchi or rales; respirations unlabored   Heart:  RRR; no murmur, rub or gallop   Abdomen:   Soft, non-tender, non-distended, positive bowel sounds.     Extremities: No clubbing, cyanosis or edema. Stasis dermatitis of bilateral extremities.     Skin: No new rashes or lesions. No draining wounds noted.       Labs, Imaging, & Other studies:   All pertinent labs and imaging studies in PACS were personally reviewed as below.  Results from last 7 days   Lab Units 08/28/24  0500 08/27/24  0500 08/26/24  0455   WBC Thousand/uL 12.78* 11.51* 14.22*   HEMOGLOBIN g/dL 11.4* 10.9* 11.2*   PLATELETS Thousands/uL 450* 432* 434*     Results from last 7 days   Lab Units 08/28/24  0000 08/27/24  1340 08/27/24  0444 08/26/24  0508   POTASSIUM mmol/L 3.5   < > 3.2* 4.0   CHLORIDE mmol/L 93*   < > 89* 91*   CO2 mmol/L 32   < > 33* 30   BUN mg/dL 87*   < > 90* 70*   CREATININE mg/dL 1.83*   < > 2.59* 2.73*   EGFR ml/min/1.73sq m 37   < > 24 23   CALCIUM mg/dL 9.5   < > 9.3 9.7   AST U/L 99*  --  257* 513*   ALT U/L 7  --  15 33   ALK PHOS U/L 218*  --  268* 302*    < > = values in this interval not displayed.     Results from last 7 days   Lab Units 08/23/24  2153 08/23/24  2055 08/23/24 2011 08/22/24  0606   BLOOD CULTURE   --  No Growth After 4 Days. No Growth After 4 Days. No Growth After 5 Days.  No Growth After 5 Days.   MRSA CULTURE ONLY  No Methicillin Resistant Staphlyococcus aureus (MRSA) isolated  --   --   --        Dior Ramon,   Internal Medicine Residency, PGY-2

## 2024-08-29 ENCOUNTER — APPOINTMENT (INPATIENT)
Dept: RADIOLOGY | Facility: HOSPITAL | Age: 69
DRG: 853 | End: 2024-08-29
Payer: MEDICARE

## 2024-08-29 PROBLEM — R06.89 ACUTE RESPIRATORY INSUFFICIENCY: Status: RESOLVED | Noted: 2024-08-23 | Resolved: 2024-08-29

## 2024-08-29 LAB
ANION GAP SERPL CALCULATED.3IONS-SCNC: 12 MMOL/L (ref 4–13)
BUN SERPL-MCNC: 91 MG/DL (ref 5–25)
CALCIUM SERPL-MCNC: 9.8 MG/DL (ref 8.4–10.2)
CHLORIDE SERPL-SCNC: 91 MMOL/L (ref 96–108)
CO2 SERPL-SCNC: 33 MMOL/L (ref 21–32)
CREAT SERPL-MCNC: 1.66 MG/DL (ref 0.6–1.3)
ERYTHROCYTE [DISTWIDTH] IN BLOOD BY AUTOMATED COUNT: 18 % (ref 11.6–15.1)
GFR SERPL CREATININE-BSD FRML MDRD: 42 ML/MIN/1.73SQ M
GLUCOSE SERPL-MCNC: 140 MG/DL (ref 65–140)
GLUCOSE SERPL-MCNC: 149 MG/DL (ref 65–140)
GLUCOSE SERPL-MCNC: 153 MG/DL (ref 65–140)
GLUCOSE SERPL-MCNC: 155 MG/DL (ref 65–140)
GLUCOSE SERPL-MCNC: 203 MG/DL (ref 65–140)
HCT VFR BLD AUTO: 38.1 % (ref 36.5–49.3)
HGB BLD-MCNC: 11.5 G/DL (ref 12–17)
MAGNESIUM SERPL-MCNC: 2 MG/DL (ref 1.9–2.7)
MCH RBC QN AUTO: 25.1 PG (ref 26.8–34.3)
MCHC RBC AUTO-ENTMCNC: 30.2 G/DL (ref 31.4–37.4)
MCV RBC AUTO: 83 FL (ref 82–98)
PHOSPHATE SERPL-MCNC: 2.6 MG/DL (ref 2.3–4.1)
PLATELET # BLD AUTO: 415 THOUSANDS/UL (ref 149–390)
PMV BLD AUTO: 9.9 FL (ref 8.9–12.7)
POTASSIUM SERPL-SCNC: 3.6 MMOL/L (ref 3.5–5.3)
RBC # BLD AUTO: 4.59 MILLION/UL (ref 3.88–5.62)
SODIUM SERPL-SCNC: 136 MMOL/L (ref 135–147)
WBC # BLD AUTO: 15.12 THOUSAND/UL (ref 4.31–10.16)

## 2024-08-29 PROCEDURE — 97116 GAIT TRAINING THERAPY: CPT

## 2024-08-29 PROCEDURE — NC001 PR NO CHARGE: Performed by: PODIATRIST

## 2024-08-29 PROCEDURE — 84100 ASSAY OF PHOSPHORUS: CPT | Performed by: INTERNAL MEDICINE

## 2024-08-29 PROCEDURE — 97530 THERAPEUTIC ACTIVITIES: CPT

## 2024-08-29 PROCEDURE — 80048 BASIC METABOLIC PNL TOTAL CA: CPT | Performed by: INTERNAL MEDICINE

## 2024-08-29 PROCEDURE — 76937 US GUIDE VASCULAR ACCESS: CPT

## 2024-08-29 PROCEDURE — 36558 INSERT TUNNELED CV CATH: CPT

## 2024-08-29 PROCEDURE — C1894 INTRO/SHEATH, NON-LASER: HCPCS

## 2024-08-29 PROCEDURE — 99232 SBSQ HOSP IP/OBS MODERATE 35: CPT | Performed by: INTERNAL MEDICINE

## 2024-08-29 PROCEDURE — 0JBQ0ZZ EXCISION OF RIGHT FOOT SUBCUTANEOUS TISSUE AND FASCIA, OPEN APPROACH: ICD-10-PCS | Performed by: PODIATRIST

## 2024-08-29 PROCEDURE — 76937 US GUIDE VASCULAR ACCESS: CPT | Performed by: RADIOLOGY

## 2024-08-29 PROCEDURE — 82948 REAGENT STRIP/BLOOD GLUCOSE: CPT

## 2024-08-29 PROCEDURE — 77001 FLUOROGUIDE FOR VEIN DEVICE: CPT | Performed by: RADIOLOGY

## 2024-08-29 PROCEDURE — C1751 CATH, INF, PER/CENT/MIDLINE: HCPCS

## 2024-08-29 PROCEDURE — 36556 INSERT NON-TUNNEL CV CATH: CPT | Performed by: RADIOLOGY

## 2024-08-29 PROCEDURE — 97535 SELF CARE MNGMENT TRAINING: CPT

## 2024-08-29 PROCEDURE — 77001 FLUOROGUIDE FOR VEIN DEVICE: CPT

## 2024-08-29 PROCEDURE — 85027 COMPLETE CBC AUTOMATED: CPT | Performed by: INTERNAL MEDICINE

## 2024-08-29 PROCEDURE — 83735 ASSAY OF MAGNESIUM: CPT | Performed by: INTERNAL MEDICINE

## 2024-08-29 RX ADMIN — POLYETHYLENE GLYCOL 3350 17 G: 17 POWDER, FOR SOLUTION ORAL at 07:37

## 2024-08-29 RX ADMIN — METHOCARBAMOL 750 MG: 750 TABLET ORAL at 20:59

## 2024-08-29 RX ADMIN — AMIODARONE HYDROCHLORIDE 200 MG: 200 TABLET ORAL at 17:23

## 2024-08-29 RX ADMIN — OXYCODONE HYDROCHLORIDE 5 MG: 5 TABLET ORAL at 23:34

## 2024-08-29 RX ADMIN — CEFAZOLIN SODIUM 2000 MG: 2 SOLUTION INTRAVENOUS at 17:21

## 2024-08-29 RX ADMIN — CEFAZOLIN SODIUM 2000 MG: 2 SOLUTION INTRAVENOUS at 06:05

## 2024-08-29 RX ADMIN — INSULIN LISPRO 1 UNITS: 100 INJECTION, SOLUTION INTRAVENOUS; SUBCUTANEOUS at 07:35

## 2024-08-29 RX ADMIN — INSULIN LISPRO 1 UNITS: 100 INJECTION, SOLUTION INTRAVENOUS; SUBCUTANEOUS at 17:27

## 2024-08-29 RX ADMIN — CEFAZOLIN SODIUM 2000 MG: 2 SOLUTION INTRAVENOUS at 11:45

## 2024-08-29 RX ADMIN — Medication 20 ML: at 10:37

## 2024-08-29 RX ADMIN — ACETAMINOPHEN 975 MG: 325 TABLET ORAL at 09:27

## 2024-08-29 RX ADMIN — METHOCARBAMOL 750 MG: 750 TABLET ORAL at 04:08

## 2024-08-29 RX ADMIN — CHLORHEXIDINE GLUCONATE 0.12% ORAL RINSE 15 ML: 1.2 LIQUID ORAL at 20:59

## 2024-08-29 RX ADMIN — SENNOSIDES AND DOCUSATE SODIUM 2 TABLET: 50; 8.6 TABLET ORAL at 17:23

## 2024-08-29 RX ADMIN — APIXABAN 5 MG: 5 TABLET, FILM COATED ORAL at 07:36

## 2024-08-29 RX ADMIN — SENNOSIDES AND DOCUSATE SODIUM 2 TABLET: 50; 8.6 TABLET ORAL at 07:36

## 2024-08-29 RX ADMIN — POTASSIUM CHLORIDE 40 MEQ: 1500 TABLET, EXTENDED RELEASE ORAL at 07:35

## 2024-08-29 RX ADMIN — PREDNISONE 10 MG: 10 TABLET ORAL at 07:36

## 2024-08-29 RX ADMIN — ACETAMINOPHEN 975 MG: 325 TABLET ORAL at 21:00

## 2024-08-29 RX ADMIN — ONDANSETRON 4 MG: 2 INJECTION INTRAMUSCULAR; INTRAVENOUS at 20:51

## 2024-08-29 RX ADMIN — METOPROLOL TARTRATE 25 MG: 25 TABLET, FILM COATED ORAL at 07:35

## 2024-08-29 RX ADMIN — METOPROLOL TARTRATE 25 MG: 25 TABLET, FILM COATED ORAL at 20:59

## 2024-08-29 RX ADMIN — CEFAZOLIN SODIUM 2000 MG: 2 SOLUTION INTRAVENOUS at 23:30

## 2024-08-29 RX ADMIN — METHOCARBAMOL 750 MG: 750 TABLET ORAL at 07:36

## 2024-08-29 RX ADMIN — AMIODARONE HYDROCHLORIDE 200 MG: 200 TABLET ORAL at 07:35

## 2024-08-29 RX ADMIN — ACETAMINOPHEN 975 MG: 325 TABLET ORAL at 04:08

## 2024-08-29 RX ADMIN — OXYCODONE HYDROCHLORIDE 5 MG: 5 TABLET ORAL at 09:27

## 2024-08-29 RX ADMIN — AMIODARONE HYDROCHLORIDE 0.5 MG/MIN: 50 INJECTION, SOLUTION INTRAVENOUS at 18:52

## 2024-08-29 RX ADMIN — METHOCARBAMOL 750 MG: 750 TABLET ORAL at 15:52

## 2024-08-29 RX ADMIN — CHLORHEXIDINE GLUCONATE 0.12% ORAL RINSE 15 ML: 1.2 LIQUID ORAL at 07:36

## 2024-08-29 RX ADMIN — APIXABAN 5 MG: 5 TABLET, FILM COATED ORAL at 17:22

## 2024-08-29 RX ADMIN — ACETAMINOPHEN 975 MG: 325 TABLET ORAL at 17:26

## 2024-08-29 NOTE — ASSESSMENT & PLAN NOTE
Baseline Cr 1-1.2  Developed FINA 8/23 with associated hyperkalemia     Plan:  Nephrology following, appreciate their recommendations  Villa removed  Avoid nephrotoxins  Strict I/Os  Trend renal function

## 2024-08-29 NOTE — PROGRESS NOTES
Cardiology Progress Note - Augustus Coffman 68 y.o. male MRN: [id number]    Unit/Bed#: [facility] 505-01 Encounter: [id number]      Assessment:  Principal Problem:    MSSA bacteremia  Active Problems:    History of hypertension    Diabetic peripheral neuropathy (HCC)    Type 2 diabetes mellitus (HCC)    FINA (acute kidney injury) on CKD stage 2(HCC)    Acute osteomyelitis of cervical spine (HCC)    Transaminitis    New onset a-fib (HCC)    Acute pericarditis    Diabetic ulcer of right foot (HCC)    Acute respiratory insufficiency    Acute neck pain    Constipation    Assessment/Plan:      Acute pericarditis with moderate pericardial effusion  -Given prolonged Staph aureus bacteremia, concern that this may be purulent pericarditis  -TTE on 8/22 showed moderate pericardial effusion without findings of tamponade and mitral valve with hyperechoic thickening at the tips consistent with most likely calcification  --Repeat TTE on 8/24 showed LVEF 45%, again no findings of tamponade  --Thoracic surgery evaluated, no plan for intervention as long as echo continues to show no tamponade physiology  - Repeat TTE on 8/27 showed LVEF 50%, no findings of tamponade and moderate sized pericardial effusion, stable in size compared to last echo  --Will need eventual LUCRECIA to rule out endocarditis. At this time blood cultures from 8/23 remain no growth at 5 days, so will hold off on LUCRECIA for now. Will also hold given pt is currently a poor candidate for intubation due to his cervical collar, and management also would not change since pt is already on a 6 week course of abx   --Per ID, recommend getting a pericardiocentesis for fluid cultures/cell counts. However, discussed with interventional cardiology and the risks of performing a pericardiocentesis on this pt outweigh the benefits at this time. Would expect presentation to be more severe if this were truly purulent pericarditis.  - Completed course of colchicine for pericarditis; continue  "prednisone.     2.  New onset atrial fibrillation with RVR  - Switched from metoprolol tartrate to amiodarone on 8/23 due to new hypotension in setting of distributive shock. Resumed metoprolol tartrate 25 mg BID on 8/26 as BP improved and pt remained tachycardic  -- Pt noted to be in atrial flutter on 8/27, so loaded with IV amiodarone 150 mg bolus and started on amio gtt.   -- Continue amiodarone gtt  -- Continue amiodarone 200 mg BID  -- Continue Lopressor 25 mg BID  - Continue Eliquis 5 mg BID for anticoagulation  - Continue to monitor heart rate trends on telemetry     3.  MSSA bacteremia with cervical discitis/osteomyelitis  - Further management per infectious disease and primary team  -- On Ancef 2 g q6h     4.  Transaminitis  - Continue to monitor LFTs closely      5.  Hypertension  -- Continue lopressor 25 mg BID, amiodarone gtt, and amiodarone 200 mg BID  - Continue to monitor blood pressures closely      6.  Thoracic aortic aneurysm  - Continue with periodic surveillance     7. Aortic stenosis  - Continue periodic outpatient surveillance        Case discussed and reviewed with Dr. Rayo. Please wait for final recommendations from Dr. Rayo. Thank you for involving us in the care of your patient.          Subjective:   Patient seen and examined.  No significant events overnight.      Objective:     Vitals: Blood pressure 140/92, pulse (!) 113, temperature (!) 97.4 °F (36.3 °C), resp. rate 17, height 6' 3\" (1.905 m), weight 113 kg (248 lb 10.9 oz), SpO2 100%., Body mass index is 31.08 kg/m².,   Orthostatic Blood Pressures      Flowsheet Row Most Recent Value   Blood Pressure 140/92 filed at 08/29/2024 0718   Patient Position - Orthostatic VS Sitting filed at 08/29/2024 0302              Intake/Output Summary (Last 24 hours) at 8/29/2024 0820  Last data filed at 8/29/2024 0718  Gross per 24 hour   Intake 940.12 ml   Output 3170 ml   Net -2229.88 ml       On telemetry review, fluctuated between atrial " fibrillation and atrial flutter between approximately 6:50 to 8:40 AM with HR in 100s to 110s. Was back in NSR after with HR 70s to 80s.      Physical Exam:    GEN: Augustus Coffman appears well, alert and oriented x 3, pleasant and cooperative   HEENT: pupils equal, round, and reactive to light; extraocular muscles intact  NECK: supple, no carotid bruits   HEART: regular rhythm, normal S1 and S2, no murmurs, clicks, gallops or rubs   LUNGS: clear to auscultation bilaterally; no wheezes, rales, or rhonchi   ABDOMEN: normal bowel sounds, soft, no tenderness, no distention  EXTREMITIES: peripheral pulses normal; no clubbing, cyanosis, or edema  NEURO: no focal findings   SKIN: normal without suspicious lesions on exposed skin    Medications:      Current Facility-Administered Medications:     acetaminophen (TYLENOL) tablet 975 mg, 975 mg, Oral, Q6H SRIDHAR, Ophelia Leon PA-C, 975 mg at 24 0408    [] amiodarone (CORDARONE) 900 mg in dextrose 5 % 500 mL infusion, 1 mg/min, Intravenous, Continuous, Stopped at 24 1923 **FOLLOWED BY** amiodarone (CORDARONE) 900 mg in dextrose 5 % 500 mL infusion, 0.5 mg/min, Intravenous, Continuous, TANVIR Go, Last Rate: 16.7 mL/hr at 24 1420, 0.5 mg/min at 24 1420    amiodarone tablet 200 mg, 200 mg, Oral, BID With Meals, Ophelia Leon PA-C, 200 mg at 24 0735    apixaban (ELIQUIS) tablet 5 mg, 5 mg, Oral, BID, Ophelia Leon PA-C, 5 mg at 24 0736    calcium carbonate (TUMS) chewable tablet 1,000 mg, 1,000 mg, Oral, Daily PRN, Breonna Santana PA-C    ceFAZolin (ANCEF) IVPB (premix in dextrose) 2,000 mg 50 mL, 2,000 mg, Intravenous, Q6H, Analisa Faulkner MD, Last Rate: 100 mL/hr at 24 0605, 2,000 mg at 24 0605    chlorhexidine (PERIDEX) 0.12 % oral rinse 15 mL, 15 mL, Mouth/Throat, Q12H SRIDHAR, Breonna Santana PA-C, 15 mL at 24 0736    HYDROmorphone (DILAUDID) injection 0.5 mg, 0.5 mg, Intravenous, Q3H PRN, Breonna  NITESH Santana, 0.5 mg at 08/27/24 0111    insulin lispro (HumALOG/ADMELOG) 100 units/mL subcutaneous injection 1-5 Units, 1-5 Units, Subcutaneous, TID AC, 1 Units at 08/29/24 0735 **AND** Fingerstick Glucose (POCT), , , TID AC, Ophelia Leon PA-C    insulin lispro (HumALOG/ADMELOG) 100 units/mL subcutaneous injection 1-5 Units, 1-5 Units, Subcutaneous, HS, Ophelia Leon PA-C, 1 Units at 08/28/24 2122    methocarbamol (ROBAXIN) tablet 750 mg, 750 mg, Oral, Q6H SRIDHAR, Breonna Santana PA-C, 750 mg at 08/29/24 0736    metoprolol tartrate (LOPRESSOR) tablet 25 mg, 25 mg, Oral, Q12H SRIDHAR, TANVIR Go, 25 mg at 08/29/24 0735    ondansetron (ZOFRAN) injection 4 mg, 4 mg, Intravenous, Q6H PRN, Breonna Santana PA-C    oxyCODONE (ROXICODONE) immediate release tablet 10 mg, 10 mg, Oral, Q4H PRN, Breonna Santana PA-C, 10 mg at 08/27/24 0047    oxyCODONE (ROXICODONE) IR tablet 5 mg, 5 mg, Oral, Q4H PRN, Breonna Santana PA-C    polyethylene glycol (MIRALAX) packet 17 g, 17 g, Oral, Daily, Amy Franco PA-C, 17 g at 08/29/24 0737    potassium chloride (Klor-Con M20) CR tablet 40 mEq, 40 mEq, Oral, Daily, TANVIR Go, 40 mEq at 08/29/24 0735    [COMPLETED] predniSONE tablet 20 mg, 20 mg, Oral, Daily, 20 mg at 08/28/24 0932 **FOLLOWED BY** predniSONE tablet 10 mg, 10 mg, Oral, Daily, TANVIR Go, 10 mg at 08/29/24 0736    senna-docusate sodium (SENOKOT S) 8.6-50 mg per tablet 2 tablet, 2 tablet, Oral, BID, Amy Franco PA-C, 2 tablet at 08/29/24 0736     Labs & Results:    Results from last 7 days   Lab Units 08/24/24  0201   CK TOTAL U/L 22*     Results from last 7 days   Lab Units 08/29/24  0414 08/28/24  0500 08/27/24  0500   WBC Thousand/uL 15.12* 12.78* 11.51*   HEMOGLOBIN g/dL 11.5* 11.4* 10.9*   HEMATOCRIT % 38.1 37.6 34.6*   PLATELETS Thousands/uL 415* 450* 432*         Results from last 7 days   Lab Units 08/29/24  0414 08/28/24  0000 08/27/24  1340 08/27/24  0444  08/26/24  0508   POTASSIUM mmol/L 3.6 3.5 4.0 3.2* 4.0   CHLORIDE mmol/L 91* 93* 91* 89* 91*   CO2 mmol/L 33* 32 32 33* 30   BUN mg/dL 91* 87* 90* 90* 70*   CREATININE mg/dL 1.66* 1.83* 2.24* 2.59* 2.73*   CALCIUM mg/dL 9.8 9.5 9.4 9.3 9.7   ALK PHOS U/L  --  218*  --  268* 302*   ALT U/L  --  7  --  15 33   AST U/L  --  99*  --  257* 513*     Results from last 7 days   Lab Units 08/28/24  0605 08/27/24  2313 08/27/24  1626 08/26/24  0453 08/25/24  0405 08/22/24  1552 08/22/24  0926   INR   --   --   --   --  1.56*  --  1.21*   PTT seconds 56* 55* 34   < > 61*   < > 40*    < > = values in this interval not displayed.     Results from last 7 days   Lab Units 08/29/24  0414 08/28/24  0000 08/27/24  0444   MAGNESIUM mg/dL 2.0 2.2 2.1

## 2024-08-29 NOTE — OCCUPATIONAL THERAPY NOTE
Occupational Therapy Progress Note     Patient Name: Augustus Coffman  Today's Date: 8/29/2024  Problem List  Principal Problem:    MSSA bacteremia  Active Problems:    History of hypertension    Diabetic peripheral neuropathy (HCC)    Type 2 diabetes mellitus (HCC)    FINA (acute kidney injury) on CKD stage 2(HCC)    Acute osteomyelitis of cervical spine (HCC)    Transaminitis    New onset a-fib (HCC)    Acute pericarditis    Diabetic ulcer of right foot (HCC)    Acute respiratory insufficiency    Acute neck pain    Constipation          08/29/24 1411   OT Last Visit   OT Visit Date 08/29/24   Note Type   Note Type Treatment   Pain Assessment   Pain Assessment Tool 0-10   Pain Score 6   Pain Location/Orientation Location: Generalized   Patient's Stated Pain Goal No pain   Hospital Pain Intervention(s) Repositioned;Ambulation/increased activity;Emotional support   Restrictions/Precautions   Weight Bearing Precautions Per Order Yes   RLE Weight Bearing Per Order WBAT   Braces or Orthoses C/S Collar   Other Precautions Multiple lines;Telemetry;O2;Fall Risk;Pain   ADL   Where Assessed Standing at sink   Grooming Assistance 5  Supervision/Setup   Grooming Deficit Teeth care   Grooming Comments pt able to reach outside of JERRY to obtain all objects needed for grooming task completion, and was able to complete task w S.   Functional Standing Tolerance   Time 4 min   Activity standing at sink for grooming   Comments stands unsupported for high level balance tasks   Transfers   Sit to Stand 4  Minimal assistance   Additional items Assist x 1   Stand to Sit 4  Minimal assistance   Additional items Assist x 1   Functional Mobility   Functional Mobility 4  Minimal assistance   Additional Comments ax1, household distance w seated rest breaks. completed 2x.   Additional items Rolling walker   Cognition   Overall Cognitive Status WFL   Arousal/Participation Alert;Responsive;Cooperative   Attention Within functional limits    Orientation Level Oriented X4   Memory Within functional limits   Following Commands Follows one step commands without difficulty   Activity Tolerance   Activity Tolerance Patient tolerated treatment well   Medical Staff Made Aware RN   Assessment   Assessment Pt seen on this date for OT session focusing on ADL retraining, fxnl standing tolerance, body mechanics, transfer retraining, increasing activity tolerance/endurance and EOB sitting to increase ability to participate in ADL/functional tasks. Pt was found in chair and was left in chair w/ all needs within reach. Pt completed transfers and FM w min Ax1 c rw for support. Pt stood at sink about 4 mins for grooming tasks; he is able to stand unsupported and complete all grooming tasks w/ S, while reaching outside of JERRY and challenging higher level balance. Pt w/ improvements in activity tolerance, transfer ability, adl task completion, however is still limited 2* decreased ADL/High-level ADL status, decreased activity tolerance/endurance, decreased self-care trans, decreased safety awareness and insight to condition.   The patient's raw score on the AM-PAC Daily Activity Inpatient Short Form is 16. A raw score of less than 19 suggests the patient may benefit from discharge to post-acute rehabilitation services. Please refer to the recommendation of the Occupational Therapist for safe discharge planning.   Recommending pt D/C to level 1  when medically stable. Pt will continue to benefit from acute OT services to meet goals.   Plan   Treatment Interventions ADL retraining;Functional transfer training;Endurance training;Activityengagement;Energy conservation   Goal Expiration Date 09/09/24   OT Treatment Day 2   OT Frequency 2-3x/wk   Discharge Recommendation   Rehab Resource Intensity Level, OT I (Maximum Resource Intensity)   -PAC Daily Activity Inpatient   Lower Body Dressing 2   Bathing 2   Toileting 2   Upper Body Dressing 3   Grooming 3   Eating 4   Daily  Activity Raw Score 16   Daily Activity Standardized Score (Calc for Raw Score >=11) 35.96   AM-PAC Applied Cognition Inpatient   Following a Speech/Presentation 4   Understanding Ordinary Conversation 4   Taking Medications 4   Remembering Where Things Are Placed or Put Away 4   Remembering List of 4-5 Errands 4   Taking Care of Complicated Tasks 4   Applied Cognition Raw Score 24   Applied Cognition Standardized Score 62.21   Modified Glenshaw Scale   Modified Glenshaw Scale 4   End of Consult   Education Provided Yes   Patient Position at End of Consult Bedside chair;All needs within reach   Nurse Communication Nurse aware of consult         ADI Mann, OTR/L

## 2024-08-29 NOTE — ASSESSMENT & PLAN NOTE
Patient with prior admission with MSSA bacteremia on 7/28 treated with 7 days of antibiotics  Etiology likely 2/2 right foot ulcer  Complicated by cervical discitis  TTE without evidence of endocarditis     Plan:  ID consulted, appreciate their recommendations  Plan for ancef x 6 weeks  Repeat blood cultures sent given episodes of diaphoresis/chills  8/23 Blood cultures: no growth to date  MRSA swab negative  UA with micro unremarkable for infection   Consider LUCRECIA tomorrow given poor visualization of valves  MRI - Discitis osteomyelitis again demonstrated at C5-C6, will repeat MRI C spine to evaluate for meningeal enhancement  Pending discussions for pericardial drainage

## 2024-08-29 NOTE — PROGRESS NOTES
Follow up Consultation    Nephrology   Augustus IRENA Coffman 68 y.o. male MRN: [id number]  Unit/Bed#: [facility] 505-01 Encounter: [id number]      Physician Requesting Consult: Tom Andre MD        ASSESSMENT:  68-year-old male with multiple comorbidities including diabetes, follicular lymphoma and recent MSSA bacteremia presented with abnormal MRI cervical spine as an outpatient.  Nephrology following for acute kidney injury.      Acute kidney injury :   FINA most likely secondary to ischemic injury secondary to ischemic injury secondary to hemodynamic perturbations plus new onset A-fib plus septic shock with subsequent ATN.  After review of records it appears that the patient has a baseline Creatinine of 0.9-1.1 mg/dL.  Admission creatinine of 1.36 mg/dL on 8/20/2024.  Creatinine today is at 1.40 mg/dL, stable and continues to improve  Dialysis consent in chart in case needed per my prior colleague   no acute indication for dialysis  Hold diuretics for now if worsening volume status may give as needed Bumex  Will need to see where new baseline creatinine will be in a period of 3 months this was discussed with the patient  check BMP, magnesium, phosphorus in a.m.  Await renal recovery.  Place on a renal diet when allowed diet order.   Strict I/O.  Daily weights  Urinary retention protocol  Avoid nephrotoxins, adjust meds to appropriate GFR.  Stable for discharge from renal standpoint medically cleared will need outpatient follow-up and lab work upon discharge please let us know when he is for discharge so we can arrange    H&H/anemia:  most recent hemoglobin at 11.5 g/dL  Maintain hemoglobin greater than 8 grams/deciliter    Acid-base electrolytes:  Hyponatremia: Restrict 1.2 L/day.  Most recent sodium stable 136 mill equivalents.  Resolved  Hyperkalemia: Most recent potassium 3.6mEq stable resolved.  On K-Janiya 40 mill equivalents cautious supplementation as diuretics now on hold will decrease to 20 mg p.o. daily for  "now    Blood pressure/A-fib/volume overload:   Optimize hemodynamic status to avoid delay in renal recovery.  Maintain MAP > 65mmHg  Avoid BP fluctuations.  Current medications:  metoprolol 25 mg p.o. every 12  Hold diuretics for now may give as needed Bumex if worsening volume status or decreased urine output    Other medical problems:  MSSA bacteremia:  Management per primary team.  In the setting of right foot ulcer concern for cervical discitis cervical osteomyelitis with paraspinal phlegmon/abscess.  On Ancef for total of 6 weeks  Pericardial effusion: Management per primary team follow-up with cardiology colchicine and prednisone discontinued   A-fib with RVR: Follow-up with cardiology, on amiodarone and heparin.  For LUCRECIA today    Plan below is what was discussed with the primary team that they agree with:  check BMP, magnesium, phosphorus in a.m.  Hold Bumex for now may give as needed diuretics if worsening volume status.  Cautious potassium supplementation will decrease to 20 mill equivalents p.o. daily for now  No acute indication for dialysis at this time  Stable for discharge from renal standpoint medically cleared please let us know when he is for discharge so we can arrange for outpatient follow-up      Thanks for the consult  Will continue to follow  Please call with questions/ concerns.      Yokasta Flores MD, FASN, 2024, 11:44 AM                Objective :   Patient seen and examined in his room for LUCRECIA later today hemodynamic stable remains afebrile happy to hear renal parameters continue to improve not on any diuretics, urine output 2.2 L / 24 hours, standing scale weight today 247 pounds      PHYSICAL EXAM  /84   Pulse 69   Temp 98.1 °F (36.7 °C)   Resp 18   Ht 6' 3\" (1.905 m)   Wt 112 kg (247 lb 2.2 oz)   SpO2 98%   BMI 30.89 kg/m²   Temp (24hrs), Av.9 °F (36.6 °C), Min:97.3 °F (36.3 °C), Max:98.2 °F (36.8 °C)        Intake/Output Summary (Last 24 hours) at 2024 " 1144  Last data filed at 8/30/2024 0200  Gross per 24 hour   Intake 1100.6 ml   Output 1710 ml   Net -609.4 ml       I/O last 24 hours:  In: 1507.3 [P.O.:960; I.V.:547.3]  Out: 2250 [Urine:2250]      Current Weight: Weight - Scale: 112 kg (247 lb 2.2 oz)  First Weight: Weight - Scale: 121 kg (267 lb 13.7 oz)  Physical Exam  Vitals and nursing note reviewed.   Constitutional:       General: He is not in acute distress.     Appearance: Normal appearance. He is obese. He is not ill-appearing, toxic-appearing or diaphoretic.   HENT:      Head: Normocephalic and atraumatic.   Eyes:      General: No scleral icterus.  Neck:      Comments: Neck collar in place  Cardiovascular:      Rate and Rhythm: Normal rate.   Pulmonary:      Effort: No respiratory distress.      Breath sounds: No stridor. No wheezing.   Abdominal:      General: There is no distension.      Palpations: Abdomen is soft.      Tenderness: There is no abdominal tenderness.   Musculoskeletal:         General: No swelling.   Skin:     Coloration: Skin is not jaundiced.   Neurological:      General: No focal deficit present.      Mental Status: He is alert and oriented to person, place, and time.   Psychiatric:         Mood and Affect: Mood normal.         Behavior: Behavior normal.             Review of Systems   Constitutional:  Negative for chills and fatigue.   HENT:  Negative for congestion.    Respiratory:  Negative for cough and wheezing.    Cardiovascular:  Negative for leg swelling.   Gastrointestinal:  Negative for abdominal pain.   Musculoskeletal:  Negative for back pain.   Neurological:  Negative for headaches.   Psychiatric/Behavioral:  Negative for agitation.    All other systems reviewed and are negative.      Scheduled Meds:  Current Facility-Administered Medications   Medication Dose Route Frequency Provider Last Rate    acetaminophen  975 mg Oral Q6H SRIDHAR Ophelia Leon PA-C      amiodarone (CORDARONE) 900 mg in dextrose 5 % 500 mL infusion   0.5 mg/min Intravenous Continuous Ophelia Leon PA-C 0.5 mg/min (08/29/24 1852)    amiodarone  200 mg Oral BID With Meals Ophelia Leon PA-C      apixaban  5 mg Oral BID Ophelia Leon PA-C      calcium carbonate  1,000 mg Oral Daily PRN Ophelia Leon PA-C      cefazolin  2,000 mg Intravenous Q8H Analisa Faulkner MD      chlorhexidine  15 mL Mouth/Throat Q12H [facility] Ophelia Leon PA-C      HYDROmorphone  0.5 mg Intravenous Q3H PRN Ophelia Leon PA-C      insulin lispro  1-5 Units Subcutaneous TID AC Ophelia Leon PA-C      insulin lispro  1-5 Units Subcutaneous HS Ophelia Leon PA-C      methocarbamol  750 mg Oral Q6H [facility] Ophelia Leon PA-C      metoprolol tartrate  25 mg Oral Q12H [facility] Ophelia Leon PA-C      ondansetron  4 mg Intravenous Q6H PRN Ophelia Leon PA-C      oxyCODONE  10 mg Oral Q4H PRN Ophelia Loen PA-C      oxyCODONE  5 mg Oral Q4H PRN Ophelia Leon PA-C      polyethylene glycol  17 g Oral Daily Ophelia Leon PA-C      potassium chloride  40 mEq Oral Daily Ophelia Leon PA-C      senna-docusate sodium  2 tablet Oral BID Ophelia Leon PA-C         PRN Meds:.  calcium carbonate    HYDROmorphone    ondansetron    oxyCODONE    oxyCODONE    Continuous Infusions:amiodarone (CORDARONE) 900 mg in dextrose 5 % 500 mL infusion, 0.5 mg/min, Last Rate: 0.5 mg/min (08/29/24 1852)          Invasive Devices:     Invasive Devices       Central Venous Catheter Line  Duration             CVC Central Lines 08/29/24 Right Other (Comment) 1 day                      LABORATORY:    Results from last 7 days   Lab Units 08/30/24  0539 08/29/24  0414 08/28/24  0500 08/28/24  0000 08/27/24  1340 08/27/24  0500 08/27/24  0444 08/26/24  0508 08/26/24  0455 08/25/24  1629 08/25/24  0406 08/25/24  0405 08/24/24  2203 08/24/24  1551 08/24/24  1003 08/24/24  0620 08/24/24  0403 08/23/24  2155 08/23/24  1939   WBC Thousand/uL  --  15.12* 12.78*  --   --  11.51*  --   --  14.22*  --   --  17.18*  --   --   --   --  22.20*  --  28.03*    HEMOGLOBIN g/dL  --  11.5* 11.4*  --   --  10.9*  --   --  11.2*  --   --  10.2*  --   --   --   --  10.4*  --  10.5*   HEMATOCRIT %  --  38.1 37.6  --   --  34.6*  --   --  36.9  --   --  33.4*  --   --   --   --  34.2*  --  34.6*   PLATELETS Thousands/uL  --  415* 450*  --   --  432*  --   --  434*  --   --  366  --   --   --   --  383  --  430*   POTASSIUM mmol/L 4.0 3.6  --  3.5 4.0  --  3.2* 4.0  --  4.3   < >  --    < > 5.3 5.5*   < >  --    < > 5.7*   CHLORIDE mmol/L 94* 91*  --  93* 91*  --  89* 91*  --  91*   < >  --    < > 94* 96   < >  --    < > 92*   CO2 mmol/L 32 33*  --  32 32  --  33* 30  --  28   < >  --    < > 24 22   < >  --    < > 25   BUN mg/dL 79* 91*  --  87* 90*  --  90* 70*  --  66*   < >  --    < > 55* 54*   < >  --    < > 46*   CREATININE mg/dL 1.40* 1.66*  --  1.83* 2.24*  --  2.59* 2.73*  --  3.05*   < >  --    < > 3.33* 3.06*   < >  --    < > 2.92*   CALCIUM mg/dL 9.8 9.8  --  9.5 9.4  --  9.3 9.7  --  9.4   < >  --    < > 9.4 8.8   < >  --    < > 10.0   MAGNESIUM mg/dL  --  2.0  --  2.2  --   --  2.1 2.3  --   --   --  2.2  --  2.5 2.3  --   --    < > 2.4   PHOSPHORUS mg/dL 2.9 2.6  --  2.8  --   --  4.0  --   --   --   --  7.1*  --  7.7* 7.4*  --   --    < > 7.2*    < > = values in this interval not displayed.      rest all reviewed    RADIOLOGY:  MRI cervical spine w wo contrast   Final Result by Becky Norwood MD (08/30 0733)      Compared to 10 days ago, persistent findings of C5-C6 osteodiscitis.      Small anterior paravertebral abscess. No epidural abscess or phlegmon.      Other stable, nonemergent findings above.                        Workstation performed: [id number]         IR tunneled central line placement   Final Result by Noah Heath MD (08/29 7850)   Impression: Successful image guided placement of tunneled central venous catheter.         Workstation performed: [id number]         MRI lumbar spine wo contrast   Final Result by Duc Justice MD (08/27 3440)       No discitis osteomyelitis involving the thoracic spine. Minimal thoracic spondylosis, as described above.      No discitis osteomyelitis involving the lumbar spine.      Multilevel lumbar spondylosis, as described above, contributing to at most moderate canal stenosis, right greater than left lateral recess stenosis, moderate to severe right and mild left neural foraminal stenosis at L4-L5.      Discitis osteomyelitis again demonstrated at C5-C6, on the localizer/sagittal vertebral counting images.      Right pleural effusion.      Workstation performed: [id number]         MRI thoracic spine wo contrast   Final Result by Duc Justice MD (08/27 0748)      No discitis osteomyelitis involving the thoracic spine. Minimal thoracic spondylosis, as described above.      No discitis osteomyelitis involving the lumbar spine.      Multilevel lumbar spondylosis, as described above, contributing to at most moderate canal stenosis, right greater than left lateral recess stenosis, moderate to severe right and mild left neural foraminal stenosis at L4-L5.      Discitis osteomyelitis again demonstrated at C5-C6, on the localizer/sagittal vertebral counting images.      Right pleural effusion.      Workstation performed: [id number]         CT chest abdomen pelvis wo contrast   Final Result by Oneil Desai MD (08/26 1615)   1.  New moderate nonspecific pericardial effusion and small bilateral pleural effusions. No other definitive manifestations of acute infection-allowing for the limitations of noncontrast CT   2.  Complex exophytic right renal cystic lesion, incompletely assessed by noncontrast CT. Nonemergent follow-up gadolinium enhanced MRI of the abdomen is recommended for further evaluation.      3.  Please refer to the report body for description of other incidental chronic and/or benign findings.               Workstation performed: [id number]         XR spine cervical 2 or 3 vw injury   Final Result by Bill  "MD Jhon (08/26 0722)      Destruction of the C5 to space with collapse of the superior endplate of C6 consistent with discitis osteomyelitis which has progressed from the prior studies.      Increased soft tissue swelling anterior to C5-6 level.      Left central venous catheter terminating at the confluence of the innominate veins.         Workstation performed: [id number]         XR spine cervical 2 or 3 vw injury    (Results Pending)     Rest all reviewed    Portions of the record may have been created with voice recognition software. Occasional wrong word or \"sound a like\" substitutions may have occurred due to the inherent limitations of voice recognition software. Read the chart carefully and recognize, using context, where substitutions have occurred.If you have any questions, please contact the dictating provider.    "

## 2024-08-29 NOTE — ASSESSMENT & PLAN NOTE
Developed new onset afib 8/21/8/22     Plan:  Amiodarone infusion completed  Amiodarone 200 mg BID  Metoprolol 25 mg BID -can uptitrate as BP tolerates   Eliquis for AC  Telemetry monitoring

## 2024-08-29 NOTE — ASSESSMENT & PLAN NOTE
Lab Results   Component Value Date    HGBA1C 6.8 (H) 07/05/2024       Recent Labs     08/28/24  1050 08/28/24  1542 08/28/24 2056 08/29/24  0633   POCGLU 191* 208* 155* 153*       Blood Sugar Average: Last 72 hrs:  (P) 147.0228949001842857  Follows as an outpatient with wound care  Likely source of MSSA bacteremia   Continue local wound care  Podiatry consulted  WBAT

## 2024-08-29 NOTE — PROGRESS NOTES
[facility]  Progress Note  Name: Augustus Coffman I  MRN: [id number]  Unit/Bed#: [facility] 505-01 I Date of Admission: 8/23/2024   Date of Service: 8/29/2024 I Hospital Day: 6    Assessment & Plan   * MSSA bacteremia  Assessment & Plan  Patient with prior admission with MSSA bacteremia on 7/28 treated with 7 days of antibiotics  Etiology likely 2/2 right foot ulcer  Complicated by cervical discitis  TTE without evidence of endocarditis     Plan:  ID consulted, appreciate their recommendations  Plan for ancef x 6 weeks  Repeat blood cultures sent given episodes of diaphoresis/chills  8/23 Blood cultures: no growth to date  MRSA swab negative  UA with micro unremarkable for infection   Consider LUCRECIA tomorrow given poor visualization of valves  MRI - Discitis osteomyelitis again demonstrated at C5-C6, will repeat MRI C spine to evaluate for meningeal enhancement  Pending discussions for pericardial drainage    Acute osteomyelitis of cervical spine (HCC)  Assessment & Plan  8/20 MRI C-Spine: Imaging findings suspicious for discitis osteomyelitis at C5-C6. 3 mm epidural phlegmon/small abscess is also suspected. There is moderate resultant central stenosis and mild mass effect on the cord  8/27 MRI - Discitis osteomyelitis again demonstrated at C5-C6   Neurosurgery consulted   Upright xrays completed  Recommending wearing of C-collar which is currently in place  Recommending 6 weeks IV antibiotics and follow-up repeat imaging  neuro checks  Awaiting repeat MRI c spine    Acute pericarditis  Assessment & Plan  8/22 Echo: Limited echo for assessment of endocarditis.  The patient's aortic valve is difficult to assess due to calcification but there is no new significant regurgitation.  The mitral valve has hyperechoic thickening at the tips consistent with most likely calcification, these were seen on the echocardiograms in July.  The tricuspid valve similarly was not well-visualized but no  new regurgitation.  The pulmonary valve was not well-visualized. Off note patient has new moderate pericardial effusion without specific echocardiographic signs of tamponade  Patient with symptoms consistent with pericarditis  8/27 TTE - EF 50%, mod focal calcification mitral valve, moderate circumferential pericardial effusion, fluid with fibrinous appearance, no sign of tamponade         Plan:  Cardiology consulted, no plans for drainage   Discuss window with thoracic surgery  Colchicine and Prednisone discontinued 8/27  Close hemodynamic monitoring    Type 2 diabetes mellitus (HCC)  Assessment & Plan  Lab Results   Component Value Date    HGBA1C 6.8 (H) 07/05/2024       Recent Labs     08/28/24  1050 08/28/24  1542 08/28/24 2056 08/29/24  0633   POCGLU 191* 208* 155* 153*       Blood Sugar Average: Last 72 hrs:  (P) 147.2498977104711407  Home regimen: metformin  Insulin infusion for better glucose control   Goal blood glucose 140-180  Transition back to SSI once tolerating PO diet    FINA (acute kidney injury) on CKD stage 2(HCC)  Assessment & Plan  Baseline Cr 1-1.2  Developed FINA 8/23 with associated hyperkalemia     Plan:  Nephrology following, appreciate their recommendations  Villa removed  Avoid nephrotoxins  Strict I/Os  Trend renal function      New onset a-fib (HCC)  Assessment & Plan  Developed new onset afib 8/21/8/22     Plan:  Amiodarone infusion completed  Amiodarone 200 mg BID  Metoprolol 25 mg BID -can uptitrate as BP tolerates   Eliquis for AC  Telemetry monitoring     History of hypertension  Assessment & Plan  BP is acceptable, continue current regimen    Constipation  Assessment & Plan  Continue bowel regimen  monitor    Diabetic ulcer of right foot (HCC)  Assessment & Plan  Lab Results   Component Value Date    HGBA1C 6.8 (H) 07/05/2024       Recent Labs     08/28/24  1050 08/28/24  1542 08/28/24 2056 08/29/24  0633   POCGLU 191* 208* 155* 153*       Blood Sugar Average: Last 72 hrs:  (P)  147.3296298561133107  Follows as an outpatient with wound care  Likely source of MSSA bacteremia   Continue local wound care  Podiatry consulted  WBAT             VTE Pharmacologic Prophylaxis: VTE Score: 3 Moderate Risk (Score 3-4) - Pharmacological DVT Prophylaxis Ordered: apixaban (Eliquis).    Mobility:   Basic Mobility Inpatient Raw Score: 16  JH-HLM Goal: 5: Stand one or more mins  JH-HLM Achieved: 4: Move to chair/commode  JH-HLM Goal achieved. Continue to encourage appropriate mobility.    Patient Centered Rounds: I performed bedside rounds with nursing staff today.   Discussions with Specialists or Other Care Team Provider: nurse, CM, ID service    Education and Discussions with Family / Patient: Updated  (wife) at bedside.    Total Time Spent on Date of Encounter in care of patient: 40 mins. This time was spent on one or more of the following: performing physical exam; counseling and coordination of care; obtaining or reviewing history; documenting in the medical record; reviewing/ordering tests, medications or procedures; communicating with other healthcare professionals and discussing with patient's family/caregivers.    Current Length of Stay: 6 day(s)  Current Patient Status: Inpatient   Certification Statement: The patient will continue to require additional inpatient hospital stay due to LUCRECIA, MRI pending  Discharge Plan: Anticipate discharge in 48-72 hrs to rehab facility.    Code Status: Level 1 - Full Code    Subjective:   Denies any new complaints, has neck pain. No weakness    Objective:     Vitals:   Temp (24hrs), Av.3 °F (36.3 °C), Min:97.2 °F (36.2 °C), Max:97.5 °F (36.4 °C)    Temp:  [97.2 °F (36.2 °C)-97.5 °F (36.4 °C)] 97.3 °F (36.3 °C)  HR:  [] 69  Resp:  [17-18] 18  BP: (116-140)/(62-92) 116/62  SpO2:  [99 %-100 %] 99 %  Body mass index is 31.08 kg/m².     Input and Output Summary (last 24 hours):     Intake/Output Summary (Last 24 hours) at 2024 4106  Last  data filed at 8/29/2024 1259  Gross per 24 hour   Intake 840.6 ml   Output 3315 ml   Net -2474.4 ml       Physical Exam:   Physical Exam  Constitutional:       Appearance: Normal appearance.   HENT:      Head: Normocephalic and atraumatic.      Nose: Nose normal.   Eyes:      Extraocular Movements: Extraocular movements intact.   Cardiovascular:      Rate and Rhythm: Normal rate and regular rhythm.   Pulmonary:      Effort: Pulmonary effort is normal.      Breath sounds: No wheezing or rales.   Musculoskeletal:      Right lower leg: No edema.      Left lower leg: No edema.   Skin:     General: Skin is warm and dry.   Neurological:      Mental Status: He is alert and oriented to person, place, and time.      Cranial Nerves: No cranial nerve deficit.      Motor: No weakness.          Additional Data:     Labs:  Results from last 7 days   Lab Units 08/29/24  0414 08/28/24  0500 08/27/24  0500 08/26/24  0455   WBC Thousand/uL 15.12*   < > 11.51* 14.22*   HEMOGLOBIN g/dL 11.5*   < > 10.9* 11.2*   HEMATOCRIT % 38.1   < > 34.6* 36.9   PLATELETS Thousands/uL 415*   < > 432* 434*   SEGS PCT %  --   --   --  84*   LYMPHO PCT %  --   --  8* 5*   MONO PCT %  --   --  7 9   EOS PCT %  --   --  0 0    < > = values in this interval not displayed.     Results from last 7 days   Lab Units 08/29/24  0414 08/28/24  0000   SODIUM mmol/L 136 138   POTASSIUM mmol/L 3.6 3.5   CHLORIDE mmol/L 91* 93*   CO2 mmol/L 33* 32   BUN mg/dL 91* 87*   CREATININE mg/dL 1.66* 1.83*   ANION GAP mmol/L 12 13   CALCIUM mg/dL 9.8 9.5   ALBUMIN g/dL  --  3.5   TOTAL BILIRUBIN mg/dL  --  0.51   ALK PHOS U/L  --  218*   ALT U/L  --  7   AST U/L  --  99*   GLUCOSE RANDOM mg/dL 149* 137     Results from last 7 days   Lab Units 08/25/24  0405   INR  1.56*     Results from last 7 days   Lab Units 08/29/24  0633 08/28/24  2056 08/28/24  1542 08/28/24  1050 08/28/24  1001 08/28/24  0810 08/28/24  0604 08/28/24  0424 08/28/24  0146 08/28/24  0000 08/27/24  2201  08/27/24 2052   POC GLUCOSE mg/dl 153* 155* 208* 191* 178* 134 129 144* 109 97 118 123         Results from last 7 days   Lab Units 08/24/24  0201 08/23/24  1939 08/23/24  1634   LACTIC ACID mmol/L 1.9 3.0* 2.5*       Lines/Drains:  Invasive Devices       Central Venous Catheter Line  Duration             CVC Central Lines 08/29/24 Right Other (Comment) <1 day                    Central Line:  Goal for removal: Will discontinue when meds requiring line are completed.         Telemetry:  Telemetry Orders (From admission, onward)               24 Hour Telemetry Monitoring  Continuous x 24 Hours (Telem)        Question:  Reason for 24 Hour Telemetry  Answer:  Arrhythmias requiring acute medical intervention / PPM or ICD malfunction                     Telemetry Reviewed: AFIB  Indication for Continued Telemetry Use: Arrthymias requiring medical therapy             Imaging: No pertinent imaging reviewed.    Recent Cultures (last 7 days):   Results from last 7 days   Lab Units 08/23/24 2055 08/23/24 2011   BLOOD CULTURE  No Growth After 5 Days. No Growth After 5 Days.       Last 24 Hours Medication List:   Current Facility-Administered Medications   Medication Dose Route Frequency Provider Last Rate    acetaminophen  975 mg Oral Q6H [facility] Ophelia Leon PA-C      amiodarone (CORDARONE) 900 mg in dextrose 5 % 500 mL infusion  0.5 mg/min Intravenous Continuous Ophelia Leon PA-C 0.5 mg/min (08/28/24 1420)    amiodarone  200 mg Oral BID With Meals Ophelia Leon PA-C      apixaban  5 mg Oral BID Ophelia Leon PA-C      calcium carbonate  1,000 mg Oral Daily PRN Ophelia Leon PA-C      cefazolin  2,000 mg Intravenous Q6H Ophelia Leon PA-C 2,000 mg (08/29/24 1145)    chlorhexidine  15 mL Mouth/Throat Q12H SRIDHAR Ophelia Leon PA-C      HYDROmorphone  0.5 mg Intravenous Q3H PRN Ophelia Leon PA-C      insulin lispro  1-5 Units Subcutaneous TID AC Ophelia Leon PA-C      insulin lispro  1-5 Units Subcutaneous HS Ophelia Leon PA-C       methocarbamol  750 mg Oral Q6H SRIDHAR Ophelia Leon PA-C      metoprolol tartrate  25 mg Oral Q12H SRIDHAR Ophelia Leon PA-C      ondansetron  4 mg Intravenous Q6H PRN Ophelia Leon PA-C      oxyCODONE  10 mg Oral Q4H PRN Ophelia Leon PA-C      oxyCODONE  5 mg Oral Q4H PRN Ophelia Leon PA-C      polyethylene glycol  17 g Oral Daily Ophelia Leon PA-C      potassium chloride  40 mEq Oral Daily Ophelia Leon PA-C      predniSONE  10 mg Oral Daily Ophelia Leon PA-C      senna-docusate sodium  2 tablet Oral BID Ophelia Leon PA-C          Today, Patient Was Seen By: Tom Andre MD    **Please Note: This note may have been constructed using a voice recognition system.**

## 2024-08-29 NOTE — PROGRESS NOTES
Podiatry - Progress Note  Patient: Augustus Coffman 68 y.o. male   MRN: [id number]  PCP: Gwen Lazo DO  Unit/Bed#: [facility] 505-01 Encounter: [id number]  Date Of Visit: 08/29/24    ASSESSMENT:    Augustus Coffman is a 68 y.o. male with:    Right full-thickness diabetic foot ulceration submetatarsal 1, stable  Type 2 diabetes mellitus  Septic shock  MSSA bacteremia  Pericarditis  Atrial fibrillation  Acute kidney injury  UTI      PLAN:    Patient was seen and evaluated at bedside. All questions and concerns addressed.  Dressing was changed. The previous dressing was clean, dry and intact without strikethrough.  Hyperkeratotic wound edge was debrided with a 15 blade.  The current dressing consists of Dermagran DSD. Next dressing change is planned on 9/5-9/6  Right submetatarsal 1 wound shows negative probe to bone with minimal, serous drainage. Periwound skin shows  no  redness, swelling and heat with hyperkeratotic tissue. Negative  crepitus and fluctuance was found. Of note the wound is not likely to be the source of bacteremia  Patients' lab and vital signs were reviewed. Patient is afebrile with leukocytosis. Patient does not  meet the SIRS criteria. Will keep monitoring.  Continue IV antibiotic Ancef pre recommendation from ID  Continue local wound care, appreciate nursing assistance with dressing changes.  Follow-up with  SLIM, ID, PT, OT, CM recommendations  Elevation and offloading on green foam wedges or pillows when non-ambulatory.  Rest of care per primary team.    Debridement Procedure:    After  Verbal Consent was obtained and time out performed. Wound located right submetatarsal 1 was  debrided using scapel. Pre-debridement wound measures 0.5 cm x 0.4 cm x 0.1 cm.  Post debridement measurement 0.8 cm x 0.6 cm x 0.1 cm for a total of 1 square centimeters, with wound appearing viable and granular. 100%  of wound debrided. Tissue debrided includes depth of Subcutaneous with devitalized tissue being  "debrided including Hyperkeratosis.  No bleeding was noted during procedure. . Pain noted during procedure rated as 0. Pain noted after procedure rated as 0. Procedure was Well tolerated by patient.      Weightbearing status: Weightbearing as tolerated right foot in surgical shoe    SUBJECTIVE:     The patient was seen, evaluated, and assessed at bedside today. The patient was awake, alert, and in no acute distress. No acute events overnight. The patient reports no pain on palpation or any concern of the right submetatarsal 1 wound. Patient denies N/V/F/chills/SOB/CP.      OBJECTIVE:     Vitals:   /92   Pulse 71   Temp (!) 97.4 °F (36.3 °C)   Resp 17   Ht 6' 3\" (1.905 m)   Wt 113 kg (248 lb 10.9 oz)   SpO2 100%   BMI 31.08 kg/m²     Temp (24hrs), Av.3 °F (36.3 °C), Min:97.1 °F (36.2 °C), Max:97.5 °F (36.4 °C)      Physical Exam:     Lungs: Non labored breathing  Abdomen: Soft, non-tender.  Lower Extremity:  Cardiovascular status at baseline from admission.  Neurological status at baseline from admission.  Musculoskeletal status  at baseline from admission. No calf tenderness noted.           Wound #: 1  Location: Submetatarsal 1  Length 0.8cm: Width 0.6cm: Depth 0.1cm:   Deepest Tissue Noted in Base: Subcutaneous  Probe to Bone: No  Peripheral Skin Description: Attached  Granulation: 100% Fibrotic Tissue: 0% Necrotic Tissue: 0%   Drainage Amount: minimal, serous  Signs of Infection: No    Clinical Images 24:    Before debridement      After debridement        Additional Data:     Labs:    Results from last 7 days   Lab Units 24  0414 24  0500 24  0500 24  0455   WBC Thousand/uL 15.12*   < > 11.51* 14.22*   HEMOGLOBIN g/dL 11.5*   < > 10.9* 11.2*   HEMATOCRIT % 38.1   < > 34.6* 36.9   PLATELETS Thousands/uL 415*   < > 432* 434*   SEGS PCT %  --   --   --  84*   LYMPHO PCT %  --   --  8* 5*   MONO PCT %  --   --  7 9   EOS PCT %  --   --  0 0    < > = values in this " "interval not displayed.     Results from last 7 days   Lab Units 08/29/24  0414 08/28/24  0000   POTASSIUM mmol/L 3.6 3.5   CHLORIDE mmol/L 91* 93*   CO2 mmol/L 33* 32   BUN mg/dL 91* 87*   CREATININE mg/dL 1.66* 1.83*   CALCIUM mg/dL 9.8 9.5   ALK PHOS U/L  --  218*   ALT U/L  --  7   AST U/L  --  99*     Results from last 7 days   Lab Units 08/25/24  0405   INR  1.56*       * I Have Reviewed All Lab Data Listed Above.    Recent Cultures (last 7 days):     Results from last 7 days   Lab Units 08/23/24 2055 08/23/24 2011   BLOOD CULTURE  No Growth After 5 Days. No Growth After 5 Days.           Imaging: I have personally reviewed pertinent films in PACS  EKG, Pathology, and Other Studies: I have personally reviewed pertinent reports.    ** Please Note: Portions of the record may have been created with voice recognition software. Occasional wrong word or \"sound a like\" substitutions may have occurred due to the inherent limitations of voice recognition software. Read the chart carefully and recognize, using context, where substitutions have occurred. **      "

## 2024-08-29 NOTE — ASSESSMENT & PLAN NOTE
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 3 mm epidural phlegmon/small abscess is also suspected. 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

## 2024-08-29 NOTE — TREATMENT PLAN
Attempted to see patient today but he had already been taken down to IR for tunneled catheter placement.  He remains otherwise afebrile.  White blood cell count 15.1.  No new cultures.  No new imaging and vitals are stable.  Discussed case with primary service attending and requested repeat MRI of the C-spine with contrast now that patient's renal function has improved further.  Again we discussed ruling out any meningeal enhancement as this would affect antibiotic dosing.  Plan remains for prolonged course which we will likely finalize once imaging available.  No plans for further cardiac imaging noted by cardiology.    ID consult service will reevaluate this patient again tomorrow.  Please contact ID attending on call if questions in the interim.

## 2024-08-29 NOTE — PROCEDURES
Central Line    Date/Time: 8/23/2024 7:03 PM    Performed by: Noah Heath MD  Authorized by: Noah Heath MD    Patient location:  IR  Universal protocol:     Procedure explained and questions answered to patient or proxy's satisfaction: yes      Relevant documents present and verified: yes      Radiology Images displayed and confirmed.  If images not available, report reviewed: yes      Immediately prior to procedure, a time out was called: yes      Patient identity confirmed:  Verbally with patient, arm band and provided demographic data  Pre-procedure details:     Hand hygiene: Hand hygiene performed prior to insertion      Sterile barrier technique: All elements of maximal sterile technique followed      Skin preparation:  ChloraPrep    Skin preparation agent: Skin preparation agent completely dried prior to procedure    Indications:     Central line indications: medications requiring central line    Anesthesia (see MAR for exact dosages):     Anesthesia method:  Local infiltration    Local anesthetic:  Lidocaine 1% w/o epi  Procedure details:     Location: R EJV.    Vessel type: vein      Approach: percutaneous technique used      Catheter type:  Double lumen    Catheter size:  6 Fr    Ultrasound guidance: yes      Ultrasound image availability:  Still images obtained and images available in PACS    Sterile ultrasound techniques: Sterile gel and sterile probe covers were used      Successful placement: yes      Catheter tip vessel location: superior vena cava    Post-procedure details:     Post-procedure:  Dressing applied and line sutured    Assessment:  Blood return through all ports and placement verified by x-ray    Post-procedure complications: none      Patient tolerance of procedure:  Tolerated well, no immediate complications       Valentin Hampton

## 2024-08-29 NOTE — PHYSICAL THERAPY NOTE
"   PT Treatment       08/29/24 1412   PT Last Visit   PT Visit Date 08/29/24   Note Type   Note Type Treatment   Pain Assessment   Pain Assessment Tool 0-10   Pain Score 6   Pain Location/Orientation Location: Neck   Hospital Pain Intervention(s) Ambulation/increased activity;Repositioned   Restrictions/Precautions   RLE Weight Bearing Per Order WBAT   Braces or Orthoses (S)  C/S Collar   Other Precautions Pain;Fall Risk;O2;Telemetry;Multiple lines  (central line, step down monitor)   General   Chart Reviewed Yes   Response to Previous Treatment Patient with no complaints from previous session.   Family/Caregiver Present Yes   Cognition   Overall Cognitive Status WFL   Orientation Level Oriented X4   Subjective   Subjective \"my neck hurts from having a one hour procedure today where I layed flat\"   Bed Mobility   Supine to Sit Unable to assess   Sit to Supine 5  Supervision   Additional items HOB elevated;Increased time required   Additional Comments patient received OOB in chair. per request of RN- patient returned to bed at end of treatment session for removal of central line. all needs met/lines intact at end of session   Transfers   Sit to Stand 4  Minimal assistance  (CG assist)   Additional items Increased time required;Verbal cues   Stand to Sit 4  Minimal assistance  (CG assist)   Additional items Assist x 1;Increased time required;Verbal cues   Additional Comments transfers with RW   Ambulation/Elevation   Gait pattern Excessively slow;Decreased foot clearance;Forward Flexion   Gait Assistance 4  Minimal assist   Additional items Assist x 1  (2nd person for lines/chair follow)   Assistive Device Rolling walker   Distance 50 feet (seated rest) + 50 feet   Ambulation/Elevation Additional Comments gait training with use of RW. patient with limited cerival extension due to collar and pain and requires standing rest breaks to look up. VC provided to encourage increase LE clearance and maintenance of upright " posture   Balance   Static Sitting Fair   Static Standing Fair -   Ambulatory Poor +   Endurance Deficit   Endurance Deficit Yes   Endurance Deficit Description pain, 6 L O2   Activity Tolerance   Activity Tolerance Patient tolerated treatment well   Nurse Made Aware [facility] to see per ANDERSON Anton   Neuro re-ed 2 sit<-->stand transfers, bed moblity with increased time and VC to improve safety and mechanics   Assessment   Prognosis Good   Problem List Decreased strength;Decreased endurance;Impaired balance;Decreased mobility;Pain   Assessment PT initiated treatment session in order to assist patient in achieving goals to improve transfers, gait training, and overall activity tolerance. This patient is a step down patient and requires increased time for safe management of lines pre/post/during mobility. Vital signs monitored throughout treatment session. He maintained sats of % on 6 L. He required CG assist for standing transfers and min-AX1 for ambulation with use of RW. Patient walked a total distance of 100 feet with seated rest breaks due to fatigue and gross weakness.Throughout treatment session patient required both verbal and tactile cuing to improve safety, efficiency, and mechanics of mobility in addition to hands on assistance for all aspects of functional mobility. Additionally, he required increased time to execute specific mobility tasks with rest breaks in between secondary to gross fatigue and weakness.  This patient would benefit from rehab upon d/c. Patient will continue to benefit from continued skilled PT this admission to achieve maximal function and safety.   Goals   Patient Goals to have less neck pain   STG Expiration Date 09/05/24   PT Treatment Day 3   Plan   Treatment/Interventions Functional transfer training;LE strengthening/ROM;Elevations;Therapeutic exercise;Endurance training;Equipment eval/education;Bed mobility;Gait training;Patient/family training;OT;Spoke to nursing    Progress Progressing toward goals   PT Frequency 3-5x/wk   Discharge Recommendation   Rehab Resource Intensity Level, PT I (Maximum Resource Intensity)   AM-PAC Basic Mobility Inpatient   Turning in Flat Bed Without Bedrails 3   Lying on Back to Sitting on Edge of Flat Bed Without Bedrails 3   Moving Bed to Chair 3   Standing Up From Chair Using Arms 3   Walk in Room 3   Climb 3-5 Stairs With Railing 2   Basic Mobility Inpatient Raw Score 17   Basic Mobility Standardized Score 39.67   [facility] Highest Level Of Mobility   -HL Goal 5: Stand one or more mins   -HLM Achieved 7: Walk 25 feet or more       The patient's AM-PAC Basic Mobility Inpatient Standardized Score is less than 42.9, suggesting this patient may benefit from discharge to post-acute rehabilitation services. Please also refer to the recommendation of the Physical Therapist for safe discharge planning.    DEE MORFIN PT, DPT

## 2024-08-29 NOTE — PLAN OF CARE
Problem: PHYSICAL THERAPY ADULT  Goal: Performs mobility at highest level of function for planned discharge setting.  See evaluation for individualized goals.  Description: Treatment/Interventions: Functional transfer training, LE strengthening/ROM, Elevations, Therapeutic exercise, Endurance training, Equipment eval/education, Bed mobility, Gait training, Spoke to nursing, Spoke to case management, OT          See flowsheet documentation for full assessment, interventions and recommendations.  Outcome: Progressing  Note: Prognosis: Good  Problem List: Decreased strength, Decreased endurance, Impaired balance, Decreased mobility, Pain  Assessment: PT initiated treatment session in order to assist patient in achieving goals to improve transfers, gait training, and overall activity tolerance. This patient is a step down patient and requires increased time for safe management of lines pre/post/during mobility. Vital signs monitored throughout treatment session. He maintained sats of % on 6 L. He required CG assist for standing transfers and min-AX1 for ambulation with use of RW. Patient walked a total distance of 100 feet with seated rest breaks due to fatigue and gross weakness.Throughout treatment session patient required both verbal and tactile cuing to improve safety, efficiency, and mechanics of mobility in addition to hands on assistance for all aspects of functional mobility. Additionally, he required increased time to execute specific mobility tasks with rest breaks in between secondary to gross fatigue and weakness.  This patient would benefit from rehab upon d/c. Patient will continue to benefit from continued skilled PT this admission to achieve maximal function and safety.  Barriers to Discharge: Inaccessible home environment     Rehab Resource Intensity Level, PT: I (Maximum Resource Intensity)    See flowsheet documentation for full assessment.

## 2024-08-29 NOTE — ASSESSMENT & PLAN NOTE
Lab Results   Component Value Date    HGBA1C 6.8 (H) 07/05/2024       Recent Labs     08/28/24  1050 08/28/24  1542 08/28/24 2056 08/29/24  0633   POCGLU 191* 208* 155* 153*       Blood Sugar Average: Last 72 hrs:  (P) 147.7680187637095712  Home regimen: metformin  Insulin infusion for better glucose control   Goal blood glucose 140-180  Transition back to [facility] once tolerating PO diet

## 2024-08-29 NOTE — SEDATION DOCUMENTATION
CVC placed to REJ by Dr. Heath without complications. Procedure tolerated well by patient and catheter is OK to use.

## 2024-08-29 NOTE — PLAN OF CARE
Problem: OCCUPATIONAL THERAPY ADULT  Goal: Performs self-care activities at highest level of function for planned discharge setting.  See evaluation for individualized goals.  Description: Treatment Interventions: ADL retraining, Functional transfer training, Endurance training, Patient/family training, Equipment evaluation/education, Continued evaluation, Energy conservation, Compensatory technique education          See flowsheet documentation for full assessment, interventions and recommendations.   Outcome: Progressing  Note: Limitation: Decreased ADL status, Decreased Safe judgement during ADL, Decreased endurance, Decreased self-care trans, Decreased high-level ADLs  Prognosis: Fair  Assessment: Pt seen on this date for OT session focusing on ADL retraining, fxnl standing tolerance, body mechanics, transfer retraining, increasing activity tolerance/endurance and EOB sitting to increase ability to participate in ADL/functional tasks. Pt was found in chair and was left in chair w/ all needs within reach. Pt completed transfers and FM w min Ax1 c rw for support. Pt stood at sink about 4 mins for grooming tasks; he is able to stand unsupported and complete all grooming tasks w/ S, while reaching outside of JERRY and challenging higher level balance. Pt w/ improvements in activity tolerance, transfer ability, adl task completion, however is still limited 2* decreased ADL/High-level ADL status, decreased activity tolerance/endurance, decreased self-care trans, decreased safety awareness and insight to condition.   The patient's raw score on the AM-PAC Daily Activity Inpatient Short Form is 16. A raw score of less than 19 suggests the patient may benefit from discharge to post-acute rehabilitation services. Please refer to the recommendation of the Occupational Therapist for safe discharge planning.   Recommending pt D/C to level 1  when medically stable. Pt will continue to benefit from acute OT services to meet  goals.     Rehab Resource Intensity Level, OT: I (Maximum Resource Intensity)

## 2024-08-30 ENCOUNTER — APPOINTMENT (OUTPATIENT)
Dept: RADIOLOGY | Facility: HOSPITAL | Age: 69
DRG: 853 | End: 2024-08-30
Payer: MEDICARE

## 2024-08-30 LAB
ANION GAP SERPL CALCULATED.3IONS-SCNC: 12 MMOL/L (ref 4–13)
BUN SERPL-MCNC: 79 MG/DL (ref 5–25)
CALCIUM SERPL-MCNC: 9.8 MG/DL (ref 8.4–10.2)
CHLORIDE SERPL-SCNC: 94 MMOL/L (ref 96–108)
CO2 SERPL-SCNC: 32 MMOL/L (ref 21–32)
CREAT SERPL-MCNC: 1.4 MG/DL (ref 0.6–1.3)
GFR SERPL CREATININE-BSD FRML MDRD: 51 ML/MIN/1.73SQ M
GLUCOSE SERPL-MCNC: 135 MG/DL (ref 65–140)
GLUCOSE SERPL-MCNC: 138 MG/DL (ref 65–140)
GLUCOSE SERPL-MCNC: 138 MG/DL (ref 65–140)
GLUCOSE SERPL-MCNC: 161 MG/DL (ref 65–140)
GLUCOSE SERPL-MCNC: 186 MG/DL (ref 65–140)
PHOSPHATE SERPL-MCNC: 2.9 MG/DL (ref 2.3–4.1)
POTASSIUM SERPL-SCNC: 4 MMOL/L (ref 3.5–5.3)
SODIUM SERPL-SCNC: 138 MMOL/L (ref 135–147)

## 2024-08-30 PROCEDURE — 84100 ASSAY OF PHOSPHORUS: CPT | Performed by: INTERNAL MEDICINE

## 2024-08-30 PROCEDURE — 82948 REAGENT STRIP/BLOOD GLUCOSE: CPT

## 2024-08-30 PROCEDURE — 72156 MRI NECK SPINE W/O & W/DYE: CPT

## 2024-08-30 PROCEDURE — 80048 BASIC METABOLIC PNL TOTAL CA: CPT | Performed by: INTERNAL MEDICINE

## 2024-08-30 PROCEDURE — 99232 SBSQ HOSP IP/OBS MODERATE 35: CPT | Performed by: INTERNAL MEDICINE

## 2024-08-30 PROCEDURE — 99233 SBSQ HOSP IP/OBS HIGH 50: CPT | Performed by: INTERNAL MEDICINE

## 2024-08-30 PROCEDURE — 99232 SBSQ HOSP IP/OBS MODERATE 35: CPT

## 2024-08-30 PROCEDURE — A9585 GADOBUTROL INJECTION: HCPCS | Performed by: INTERNAL MEDICINE

## 2024-08-30 RX ORDER — POTASSIUM CHLORIDE 1500 MG/1
20 TABLET, EXTENDED RELEASE ORAL DAILY
Status: DISCONTINUED | OUTPATIENT
Start: 2024-08-31 | End: 2024-09-01

## 2024-08-30 RX ORDER — CEFAZOLIN SODIUM 2 G/50ML
2000 SOLUTION INTRAVENOUS EVERY 8 HOURS
Status: DISCONTINUED | OUTPATIENT
Start: 2024-08-30 | End: 2024-09-03 | Stop reason: HOSPADM

## 2024-08-30 RX ORDER — GADOBUTROL 604.72 MG/ML
11 INJECTION INTRAVENOUS
Status: COMPLETED | OUTPATIENT
Start: 2024-08-30 | End: 2024-08-30

## 2024-08-30 RX ADMIN — ACETAMINOPHEN 975 MG: 325 TABLET ORAL at 05:24

## 2024-08-30 RX ADMIN — METHOCARBAMOL 750 MG: 750 TABLET ORAL at 21:51

## 2024-08-30 RX ADMIN — GADOBUTROL 11 ML: 604.72 INJECTION INTRAVENOUS at 01:25

## 2024-08-30 RX ADMIN — PREDNISONE 10 MG: 10 TABLET ORAL at 08:13

## 2024-08-30 RX ADMIN — METOPROLOL TARTRATE 25 MG: 25 TABLET, FILM COATED ORAL at 08:13

## 2024-08-30 RX ADMIN — AMIODARONE HYDROCHLORIDE 200 MG: 200 TABLET ORAL at 16:54

## 2024-08-30 RX ADMIN — METHOCARBAMOL 750 MG: 750 TABLET ORAL at 15:04

## 2024-08-30 RX ADMIN — METHOCARBAMOL 750 MG: 750 TABLET ORAL at 02:00

## 2024-08-30 RX ADMIN — HYDROMORPHONE HYDROCHLORIDE 0.5 MG: 1 INJECTION, SOLUTION INTRAMUSCULAR; INTRAVENOUS; SUBCUTANEOUS at 00:27

## 2024-08-30 RX ADMIN — ACETAMINOPHEN 975 MG: 325 TABLET ORAL at 21:50

## 2024-08-30 RX ADMIN — CEFAZOLIN SODIUM 2000 MG: 2 SOLUTION INTRAVENOUS at 15:12

## 2024-08-30 RX ADMIN — APIXABAN 5 MG: 5 TABLET, FILM COATED ORAL at 17:04

## 2024-08-30 RX ADMIN — SENNOSIDES AND DOCUSATE SODIUM 2 TABLET: 50; 8.6 TABLET ORAL at 17:04

## 2024-08-30 RX ADMIN — METOPROLOL TARTRATE 25 MG: 25 TABLET, FILM COATED ORAL at 21:51

## 2024-08-30 RX ADMIN — APIXABAN 5 MG: 5 TABLET, FILM COATED ORAL at 08:13

## 2024-08-30 RX ADMIN — CHLORHEXIDINE GLUCONATE 0.12% ORAL RINSE 15 ML: 1.2 LIQUID ORAL at 21:51

## 2024-08-30 RX ADMIN — SENNOSIDES AND DOCUSATE SODIUM 2 TABLET: 50; 8.6 TABLET ORAL at 08:13

## 2024-08-30 RX ADMIN — AMIODARONE HYDROCHLORIDE 200 MG: 200 TABLET ORAL at 08:13

## 2024-08-30 RX ADMIN — CEFAZOLIN SODIUM 2000 MG: 2 SOLUTION INTRAVENOUS at 21:51

## 2024-08-30 RX ADMIN — CEFAZOLIN SODIUM 2000 MG: 2 SOLUTION INTRAVENOUS at 05:29

## 2024-08-30 RX ADMIN — CHLORHEXIDINE GLUCONATE 0.12% ORAL RINSE 15 ML: 1.2 LIQUID ORAL at 08:13

## 2024-08-30 RX ADMIN — INSULIN LISPRO 1 UNITS: 100 INJECTION, SOLUTION INTRAVENOUS; SUBCUTANEOUS at 12:13

## 2024-08-30 RX ADMIN — ACETAMINOPHEN 975 MG: 325 TABLET ORAL at 12:09

## 2024-08-30 RX ADMIN — INSULIN LISPRO 1 UNITS: 100 INJECTION, SOLUTION INTRAVENOUS; SUBCUTANEOUS at 16:52

## 2024-08-30 RX ADMIN — METHOCARBAMOL 750 MG: 750 TABLET ORAL at 08:13

## 2024-08-30 RX ADMIN — POTASSIUM CHLORIDE 40 MEQ: 1500 TABLET, EXTENDED RELEASE ORAL at 08:13

## 2024-08-30 RX ADMIN — OXYCODONE HYDROCHLORIDE 10 MG: 10 TABLET ORAL at 05:39

## 2024-08-30 NOTE — ASSESSMENT & PLAN NOTE
Lab Results   Component Value Date    HGBA1C 6.8 (H) 07/05/2024       Recent Labs     08/29/24  1625 08/29/24 2035 08/30/24  0533 08/30/24  1032   POCGLU 203* 140 135 161*         Blood Sugar Average: Last 72 hrs:  (P) 149.72  Follows as an outpatient with wound care  Likely source of MSSA bacteremia   Continue local wound care  Podiatry consulted  WBAT

## 2024-08-30 NOTE — PROGRESS NOTES
Follow up Consultation    Nephrology   Augustus Coffman 68 y.o. male MRN: [id number]  Unit/Bed#: [facility] 505-01 Encounter: [id number]      Physician Requesting Consult: Tom Andre MD        ASSESSMENT:  68-year-old male with multiple comorbidities including diabetes, follicular lymphoma and recent MSSA bacteremia presented with abnormal MRI cervical spine as an outpatient.  Nephrology following for acute kidney injury.      Acute kidney injury :   FINA most likely secondary to ischemic injury secondary to ischemic injury secondary to hemodynamic perturbations plus new onset A-fib plus septic shock with subsequent ATN.  After review of records it appears that the patient has a baseline Creatinine of 0.9-1.1 mg/dL.  Admission creatinine of 1.36 mg/dL on 8/20/2024.  Creatinine today is at 1.21 mg/dL, stable and continues to improve towards baseline  Dialysis consent in chart in case needed per my prior colleague   no acute indication for dialysis  Hold diuretics for now if worsening volume status may give as needed Bumex  Will need to see where new baseline creatinine will be in a period of 3 months this was discussed with the patient  Low threshold to give IV fluids if there is a rise in creatinine in next 24 hours encourage patient p.o. hydration  check BMP, magnesium, phosphorus in a.m.  Await renal recovery.  Place on a renal diet when allowed diet order.   Strict I/O.  Daily weights  Urinary retention protocol  Avoid nephrotoxins, adjust meds to appropriate GFR.  Stable for discharge from renal standpoint medically cleared will need outpatient follow-up and lab work upon discharge please let us know when he is for discharge so we can arrange  Outpatient follows up with Dr. PERRY Heath    H&H/anemia:  most recent hemoglobin at 11.5 g/dL  Maintain hemoglobin greater than 8 grams/deciliter    Acid-base electrolytes:  Hyponatremia: Restrict 1.2 L/day.  Most recent sodium stable 137 mill equivalents.   "Resolved  Hyperkalemia: Most recent potassium 4.4 mEq stable resolved.  Will DC potassium supplementation for now    Blood pressure/A-fib/volume overload:   Optimize hemodynamic status to avoid delay in renal recovery.  Maintain MAP > 65mmHg  Avoid BP fluctuations.  Current medications:  metoprolol 25 mg p.o. every 12  Hold diuretics for now may give as needed Bumex if worsening volume status or decreased urine output    Other medical problems:  MSSA bacteremia:  Management per primary team.  In the setting of right foot ulcer concern for cervical discitis cervical osteomyelitis with paraspinal phlegmon/abscess.  On Ancef for total of 6 weeks  Pericardial effusion: Management per primary team follow-up with cardiology colchicine and prednisone discontinued   A-fib with RVR: Follow-up with cardiology, on amiodarone and heparin.  For LUCRECIA today    Plan below is what was discussed with the primary team that they agree with:  check BMP, magnesium, phosphorus in a.m.  Hold Bumex for now may give as needed diuretics if worsening volume status.  DC potassium supplementation for now  No acute indication for dialysis at this time  Stable for discharge from renal standpoint medically cleared please let us know when he is for discharge so we can arrange for outpatient follow-up  Low threshold to give IV fluids if worsening creatinine due to ongoing self diuresis      Thanks for the consult  Will continue to follow  Please call with questions/ concerns.      Yokasta Flores MD, FASN, 2024, 10:39 AM                Objective :   Patient seen and examined in his room no overnight events hemodynamic stable remains afebrile happy to hear renal parameters continue to improve      PHYSICAL EXAM  /65 (BP Location: Right arm)   Pulse 92   Temp 98 °F (36.7 °C) (Oral)   Resp 18   Ht 6' 3\" (1.905 m)   Wt 112 kg (246 lb 3.2 oz)   SpO2 93%   BMI 30.77 kg/m²   Temp (24hrs), Av.2 °F (36.8 °C), Min:97.9 °F (36.6 °C), " Max:98.5 °F (36.9 °C)        Intake/Output Summary (Last 24 hours) at 8/31/2024 1039  Last data filed at 8/31/2024 1017  Gross per 24 hour   Intake 1324.3 ml   Output 1950 ml   Net -625.7 ml       I/O last 24 hours:  In: 1324.3 [P.O.:710; I.V.:514.3; IV Piggyback:100]  Out: 3350 [Urine:3350]      Current Weight: Weight - Scale: 112 kg (246 lb 3.2 oz)  First Weight: Weight - Scale: 121 kg (267 lb 13.7 oz)  Physical Exam  Vitals and nursing note reviewed.   Constitutional:       General: He is not in acute distress.     Appearance: Normal appearance. He is obese. He is not ill-appearing, toxic-appearing or diaphoretic.   HENT:      Head: Normocephalic and atraumatic.      Mouth/Throat:      Pharynx: No oropharyngeal exudate.   Eyes:      General: No scleral icterus.  Neck:      Comments: Neck collar in place  Cardiovascular:      Rate and Rhythm: Normal rate.   Pulmonary:      Effort: No respiratory distress.      Breath sounds: No stridor. No wheezing.   Abdominal:      General: There is no distension.      Palpations: Abdomen is soft.      Tenderness: There is no abdominal tenderness.   Musculoskeletal:         General: No swelling.   Skin:     Coloration: Skin is not jaundiced.   Neurological:      General: No focal deficit present.      Mental Status: He is alert and oriented to person, place, and time. Mental status is at baseline.   Psychiatric:         Mood and Affect: Mood normal.         Behavior: Behavior normal.             Review of Systems   Constitutional:  Negative for chills and fever.   HENT:  Negative for congestion.    Respiratory:  Negative for shortness of breath and wheezing.    Cardiovascular:  Negative for leg swelling.   Gastrointestinal:  Negative for abdominal pain.   Genitourinary:  Negative for flank pain.   Musculoskeletal:  Negative for back pain.   Neurological:  Negative for headaches.   Psychiatric/Behavioral:  Negative for agitation.    All other systems reviewed and are  negative.      Scheduled Meds:  Current Facility-Administered Medications   Medication Dose Route Frequency Provider Last Rate    acetaminophen  975 mg Oral Q6H SRIDHAR Ophelia Leon PA-C      amiodarone (CORDARONE) 900 mg in dextrose 5 % 500 mL infusion  0.5 mg/min Intravenous Continuous Ophelia Leon PA-C 0.5 mg/min (08/31/24 0121)    amiodarone  200 mg Oral BID With Meals Ophelia Leon PA-C      apixaban  5 mg Oral BID Ophelia Leon PA-C      calcium carbonate  1,000 mg Oral Daily PRN Ophelia Leon PA-C      cefazolin  2,000 mg Intravenous Q8H Tom Andre MD Stopped (08/31/24 0700)    chlorhexidine  15 mL Mouth/Throat Q12H SRIDHAR Ophelia Leon PA-C      insulin lispro  1-5 Units Subcutaneous TID AC Ophelia Leon PA-C      insulin lispro  1-5 Units Subcutaneous HS Ophelia Leon PA-C      methocarbamol  750 mg Oral Q6H [facility] Ophelia Leon PA-C      metoprolol tartrate  25 mg Oral Q12H SRIDHAR Ophelia Leon PA-C      ondansetron  4 mg Intravenous Q6H PRN Ophelia Leon PA-C      oxyCODONE  10 mg Oral Q4H PRN Ophelia Leon PA-C      oxyCODONE  5 mg Oral Q4H PRN Ophelia Leon PA-C      polyethylene glycol  17 g Oral Daily Ophelia Leon PA-C      potassium chloride  20 mEq Oral Daily Yokasta Flores MD      senna-docusate sodium  2 tablet Oral BID Ophelia Leon PA-C         PRN Meds:.  calcium carbonate    ondansetron    oxyCODONE    oxyCODONE    Continuous Infusions:amiodarone (CORDARONE) 900 mg in dextrose 5 % 500 mL infusion, 0.5 mg/min, Last Rate: 0.5 mg/min (08/31/24 0121)          Invasive Devices:     Invasive Devices       Central Venous Catheter Line  Duration             CVC Central Lines 08/29/24 Right Other (Comment) 1 day                      LABORATORY:    Results from last 7 days   Lab Units 08/31/24  0407 08/30/24  0539 08/29/24  0414 08/28/24  0500 08/28/24  0000 08/27/24  1340 08/27/24  0500 08/27/24  0444 08/26/24  0508 08/26/24  0455 08/25/24  0406 08/25/24  0405 08/24/24  2203 08/24/24  1551   WBC Thousand/uL   --   --  15.12* 12.78*  --   --  11.51*  --   --  14.22*  --  17.18*  --   --    HEMOGLOBIN g/dL  --   --  11.5* 11.4*  --   --  10.9*  --   --  11.2*  --  10.2*  --   --    HEMATOCRIT %  --   --  38.1 37.6  --   --  34.6*  --   --  36.9  --  33.4*  --   --    PLATELETS Thousands/uL  --   --  415* 450*  --   --  432*  --   --  434*  --  366  --   --    POTASSIUM mmol/L 4.4 4.0 3.6  --  3.5 4.0  --  3.2* 4.0  --    < >  --    < > 5.3   CHLORIDE mmol/L 94* 94* 91*  --  93* 91*  --  89* 91*  --    < >  --    < > 94*   CO2 mmol/L 33* 32 33*  --  32 32  --  33* 30  --    < >  --    < > 24   BUN mg/dL 65* 79* 91*  --  87* 90*  --  90* 70*  --    < >  --    < > 55*   CREATININE mg/dL 1.21 1.40* 1.66*  --  1.83* 2.24*  --  2.59* 2.73*  --    < >  --    < > 3.33*   CALCIUM mg/dL 9.7 9.8 9.8  --  9.5 9.4  --  9.3 9.7  --    < >  --    < > 9.4   MAGNESIUM mg/dL  --   --  2.0  --  2.2  --   --  2.1 2.3  --   --  2.2  --  2.5   PHOSPHORUS mg/dL 2.8 2.9 2.6  --  2.8  --   --  4.0  --   --   --  7.1*  --  7.7*    < > = values in this interval not displayed.      rest all reviewed    RADIOLOGY:  MRI cervical spine w wo contrast   Final Result by Becky Norwood MD (08/30 0733)      Compared to 10 days ago, persistent findings of C5-C6 osteodiscitis.      Small anterior paravertebral abscess. No epidural abscess or phlegmon.      Other stable, nonemergent findings above.                        Workstation performed: [id number]         IR tunneled central line placement   Final Result by Noah Heath MD (08/29 1140)   Impression: Successful image guided placement of tunneled central venous catheter.         Workstation performed: [id number]         MRI lumbar spine wo contrast   Final Result by Duc Justice MD (08/27 7720)      No discitis osteomyelitis involving the thoracic spine. Minimal thoracic spondylosis, as described above.      No discitis osteomyelitis involving the lumbar spine.      Multilevel lumbar spondylosis, as  described above, contributing to at most moderate canal stenosis, right greater than left lateral recess stenosis, moderate to severe right and mild left neural foraminal stenosis at L4-L5.      Discitis osteomyelitis again demonstrated at C5-C6, on the localizer/sagittal vertebral counting images.      Right pleural effusion.      Workstation performed: [id number]         MRI thoracic spine wo contrast   Final Result by Duc Justice MD (08/27 0748)      No discitis osteomyelitis involving the thoracic spine. Minimal thoracic spondylosis, as described above.      No discitis osteomyelitis involving the lumbar spine.      Multilevel lumbar spondylosis, as described above, contributing to at most moderate canal stenosis, right greater than left lateral recess stenosis, moderate to severe right and mild left neural foraminal stenosis at L4-L5.      Discitis osteomyelitis again demonstrated at C5-C6, on the localizer/sagittal vertebral counting images.      Right pleural effusion.      Workstation performed: [id number]         CT chest abdomen pelvis wo contrast   Final Result by Oneil Desai MD (08/26 1615)   1.  New moderate nonspecific pericardial effusion and small bilateral pleural effusions. No other definitive manifestations of acute infection-allowing for the limitations of noncontrast CT   2.  Complex exophytic right renal cystic lesion, incompletely assessed by noncontrast CT. Nonemergent follow-up gadolinium enhanced MRI of the abdomen is recommended for further evaluation.      3.  Please refer to the report body for description of other incidental chronic and/or benign findings.               Workstation performed: [id number]         XR spine cervical 2 or 3 vw injury   Final Result by Bill Ferreira MD (08/26 0722)      Destruction of the C5 to space with collapse of the superior endplate of C6 consistent with discitis osteomyelitis which has progressed from the prior studies.      Increased soft  "tissue swelling anterior to C5-6 level.      Left central venous catheter terminating at the confluence of the innominate veins.         Workstation performed: [id number]         XR spine cervical 2 or 3 vw injury    (Results Pending)     Rest all reviewed    Portions of the record may have been created with voice recognition software. Occasional wrong word or \"sound a like\" substitutions may have occurred due to the inherent limitations of voice recognition software. Read the chart carefully and recognize, using context, where substitutions have occurred.If you have any questions, please contact the dictating provider.    "

## 2024-08-30 NOTE — PROGRESS NOTES
Progress Note - Infectious Disease   Augustus Coffman 68 y.o. male MRN: [id number]  Unit/Bed#: [facility] 505-01 Encounter: [id number]      Impression/Plan:  1.  Shock.  Patient post transfer developed A-fib with RVR and also hypotension requiring pressors.  White count elevated but this may be related to recent steroid.  Blood cultures have cleared.  Unlikely infectious in etiology and pressors have been weaned off.  Pan CT imaging without contrast without new acute findings.  MRI T and L-spine unremarkable.  Repeat echo showing smaller/organized fluid.  MRI C-spine without meningeal disease.  Continue Ancef, decrease dose to 2 g every 8 hours  Duration of therapy and follow-up imaging as below  Trend fever curve/WBC while admitted  Weekly lab monitoring otherwise as below  Further cardiac imaging as outpatient with cardiology  Podiatry evaluation appreciated, will hold off on further images and monitor  Maintain current tunneled central venous catheter  Ongoing follow-up by cardiology  Ongoing follow-up by nephrology  Additional supportive care/discharge per primary     2.  MSSA bacteremia.  Blood cultures have returned positive for MSSA and now appear to be cleared.  Initial source is #7.  Course now further complicated by #3 and #4.  2D echo limited.  Concern remains for endovascular/perivalvular complications given possible prolonged bacteremia.  Would question if patient is also developing secondary autoimmune response to his infection given #4 and 5.  Unable to definitively diagnose purulent pericarditis.  Patient also too high risk for LUCRECIA now.  Overall fortunately clinically improving.  Repeat MRI C-spine without further epidural changes.  Continue Ancef 2 g every 8 hours  Plan for total 6 weeks of therapy from blood culture clearance through 10/2  Weekly labs with CBCD, creatinine, ESR and CRP on antibiotic  Plan for repeat MRI C-spine with contrast in 6 to 8 weeks, for abscess resolution  Anticipate transition  to oral antibiotic with Duricef on 10/3  Will likely continue oral antibiotic until ESR/CRP improve  Maintain current tunneled central line  Plan for follow-up in ID office 2 weeks after discharge  Tentative plans for discharge to rehab  ID office staff notified of the above follow-up needs  Further cardiac imaging and follow-up with cardiology as outpatient  If patient is discharged to [facility], please reconsult our service and we will evaluate again on Tuesday     3.  C5-C6 discitis/osteomyelitis with epidural phlegmon/abscess.  Found on outpatient imaging.  Complication of #2.  Patient fortunately without any focal neurologic deficits.  No other lesions on MRI T and L-spine.  Repeat MRI C-spine now without any epidural phlegmon/abscess.  Only paravertebral abscess remains.  Neurosurgical evaluation noted, follow-up outpatient  Repeat MR imaging as outpatient  Antibiotics as above  Trend fever curve/WBC  Monitor neuroexam  Additional care as above     4.  Acute pericarditis with new pericardial effusion.  Patient developed abrupt onset of chest pain with ST elevations.  Troponins negative.  Clinical picture seems consistent with acute pericarditis.  Uncertain however if he has a purulent pericarditis.  Thankfully no tamponade physiology.  High risk for further imaging/intervention.  Repeat cardiac imaging as outpatient with cardiology  Antibiotics as above  Trend fever curve/WBC  Thoracic surgery evaluation noted     5.  Acute kidney injury on chronic kidney disease.  Acute elevation in creatinine noted from baseline of 1-1.2.  Likely due to acute illness, shock and also consider postinfectious autoimmune process.  Creatinine improving.  Ongoing follow-up by nephrology  Antibiotics as above  Will avoid contrast for now  Will dose adjust antibiotics as needed  Repeat chemistry tomorrow  Fluid hydration per primary     6.  Acute transaminitis.  Acute elevation in LFTs noted likely due to the above.  Right upper quadrant  ultrasound unremarkable.  Improving.   Antibiotics otherwise as above     7.  Chronic right foot ulcer with hardware present.  Patient has had a chronic ulcer present for some time.  Imaging reviewed and there is hardware present.  Podiatry evaluation noted and there is no concern for deeper tracking hardware infection and MRI of limited utility on discussion.  Patient denies the wound ever tracking to his hardware.  Podiatry evaluation appreciated  Continue antibiotics as above  Will continue local wound care  Hold off on further imaging for now, if stable  Trend fever curve/WBC     8.  Type 2 diabetes and obesity.  Hemoglobin A1c of 6.8.  BMI of 34.  The latter can impact antibiotic dosing.  Will continue current antibiotic dosing as above pending further imaging and favor higher dosing when possible.  Glucose management per primary     9.  Lymphoma, in remission.  Patient is not on any active chemotherapy or treatment and has been in remission for about 2 years.  Follow-up with chronic providers outpatient.      Above plan discussed in detail with the patient at bedside  Above plan discussed in detail with primary service attending who reports plans for likely transition to acute rehab if accepted    ID consult service will reevaluate this patient again on Tuesday, if admitted.  He is reconsult our service if he is transition to [facility].  Contact ID attending on call if questions.    Antibiotics:  Ancef 11    24 Hour events:  Yesterday and overnight notes reviewed and no acute events noted.  Patient underwent repeat MRI C-spine and cardiology reviewed and plans now for LUCRECIA as outpatient.    Subjective:  Patient seen at bedside he denied having any nausea, vomiting, chest pain or shortness of breath.  Tolerating current antibiotics without issue.  We reviewed current MRI findings and adjustment to antibiotic dosing.  We briefly discussed duration of treatment.  He is being considered for acute rehab at [facility].  Discussed  with primary.    Objective:  Vitals:  Temp:  [97.3 °F (36.3 °C)-98.2 °F (36.8 °C)] 98.1 °F (36.7 °C)  HR:  [68-78] 69  Resp:  [15-20] 18  BP: (116-138)/(62-84) 131/84  SpO2:  [96 %-100 %] 98 %  Temp (24hrs), Av.9 °F (36.6 °C), Min:97.3 °F (36.3 °C), Max:98.2 °F (36.8 °C)  Current: Temperature: 98.1 °F (36.7 °C)    Physical Exam:   General Appearance:  Alert, interactive, nontoxic, no acute distress.  Remains in c-collar   Throat: Oropharynx moist without lesions.    Lungs:   Clear to auscultation bilaterally; no wheezes, rhonchi or rales; respirations unlabored on room air   Heart:  RRR; no murmur, rub or gallop noted   Abdomen:   Soft, non-tender, non-distended, positive bowel sounds.     Extremities: No clubbing, cyanosis or edema   Skin: No new rashes or lesions. No new draining wounds noted.       Labs, Imaging, & Other studies:   All pertinent labs and imaging studies in PACS were personally reviewed as below.  Results from last 7 days   Lab Units 24  0414 24  0500 24  0500   WBC Thousand/uL 15.12* 12.78* 11.51*   HEMOGLOBIN g/dL 11.5* 11.4* 10.9*   PLATELETS Thousands/uL 415* 450* 432*     Results from last 7 days   Lab Units 24  0539 24  0414 24  0000 24  1340 24  0444 24  0508   POTASSIUM mmol/L 4.0   < > 3.5   < > 3.2* 4.0   CHLORIDE mmol/L 94*   < > 93*   < > 89* 91*   CO2 mmol/L 32   < > 32   < > 33* 30   BUN mg/dL 79*   < > 87*   < > 90* 70*   CREATININE mg/dL 1.40*   < > 1.83*   < > 2.59* 2.73*   EGFR ml/min/1.73sq m 51   < > 37   < > 24 23   CALCIUM mg/dL 9.8   < > 9.5   < > 9.3 9.7   AST U/L  --   --  99*  --  257* 513*   ALT U/L  --   --  7  --  15 33   ALK PHOS U/L  --   --  218*  --  268* 302*    < > = values in this interval not displayed.     Results from last 7 days   Lab Units 24  2153 245 24   BLOOD CULTURE   --  No Growth After 5 Days. No Growth After 5 Days.   MRSA CULTURE ONLY  No Methicillin Resistant  Staphlyococcus aureus (MRSA) isolated  --   --        Lab interpretation/comments: Mild leukocytosis.  Creatinine stable    Imaging interpretation/comments: MRI C-spine reviewed and compared to 10 days prior there remains changes again at C5-6 and there is an anterior paravertebral abscess but no epidural abscess or phlegmon.    Culture data: Blood cultures cleared on 8/22    External notes: None

## 2024-08-30 NOTE — ASSESSMENT & PLAN NOTE
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 3 mm epidural phlegmon/small abscess is also suspected. There is moderate resultant central stenosis and mild mass effect on the cord  8/27 MRI - Discitis osteomyelitis again demonstrated at C5-C6   Neurosurgery consulted   Upright xrays completed  Recommending wearing of C-collar which is currently in place  Recommending 6 weeks IV antibiotics and follow-up repeat imaging  Monitor neurologic checks  8/29 MRI cervical spine with stable findings and no meningeal enhancement

## 2024-08-30 NOTE — ASSESSMENT & PLAN NOTE
8/22 Echo: Limited echo for assessment of endocarditis.  The patient's aortic valve is difficult to assess due to calcification but there is no new significant regurgitation.  The mitral valve has hyperechoic thickening at the tips consistent with most likely calcification, these were seen on the echocardiograms in July.  The tricuspid valve similarly was not well-visualized but no new regurgitation.  The pulmonary valve was not well-visualized. Off note patient has new moderate pericardial effusion without specific echocardiographic signs of tamponade  Patient with symptoms consistent with pericarditis  8/27 TTE - EF 50%, mod focal calcification mitral valve, moderate circumferential pericardial effusion, fluid with fibrinous appearance, no sign of tamponade         Plan:  Cardiology consulted, no plans for drainage   Completed Colchicine and Prednisone on 8/27  Close hemodynamic monitoring  Outpatient follow up with cardiology for monitoring of effusion

## 2024-08-30 NOTE — ASSESSMENT & PLAN NOTE
Baseline Cr 1-1.2  Developed FINA 8/23 with associated hyperkalemia     Plan:  Nephrology following, appreciate their recommendations  Villa removed  Avoid nephrotoxins  Strict I/Os  Trend renal function - improved  Outpatient follow up with nephrology

## 2024-08-30 NOTE — TELEPHONE ENCOUNTER
8/30/24 - PT STILL IN HOSPITAL    08/28/2024- PT STILL IN HOSPITAL     08/26/2024- [facility]   09/23/2024- 4 WK HFU W/ cervical Xrays W/ AP     NITESH [place] Clerical  4 wk follow-up AP with cervical Xrays for cervical osteomyelitis.

## 2024-08-30 NOTE — RESTORATIVE TECHNICIAN NOTE
Restorative Technician Note      Patient Name: Augustus OSBORN Augustus     Restorative Tech Visit Date: 08/30/24  Note Type: Mobility  Patient Position Upon Consult: Bedside chair  Activity Performed: Ambulated  Assistive Device: Other (Comment) (I.V. Pole)  Patient Position at End of Consult: Bedside chair; All needs within reach

## 2024-08-30 NOTE — PROGRESS NOTES
Physical Medicine and Rehabilitation Progress Note  Augustus Coffman 68 y.o. male MRN: [id number]  Unit/Bed#: [facility] 505-01 Encounter: [id number]      Chief Complaints:  Neck pain     Interval Events/Subjective:     - Follow-up MRI C spine shows stable discitis/OM with small anterior paravert abscess without epidural abscess or phelgmom; ID reduced IV Ancef dose  - LUCRECIA will now be held and can be done in approx 6 weeks when C spine more stable and out of collar per cards  - Remains on amio drip but per cards this can be transitioned to PO whenever discharged  - On eval, patient reports still fair amount neck pain slightly worse after laying of MRI recently.  He denies worsening strength, sensation, radiating limb pain. He notes poor appetite and having BM recently after 10 d.  He denies fever, chills, and reports walking better with staff.      ROS: A 10 point ROS was performed; negative except as noted above.     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  Patient saw outpatient orthopedics who recommended MRI of C-spine which showed discitis/osteomyelitis at C5-6 with paraspinal phlegmon/abscess and he was readmitted and eventually transferred to [facility] due to septic shock, MSSA bacteremia, pericarditis and pericardial effusion.  Neurosurgery consulted and did not feel patient had neuropathic compromise and recommended antibiotics per ID with cervical collar and follow-up x-rays in 4 weeks.  ID recommending high-dose IV Ancef and  eventual repeat of MRI.  There was consideration for pericardiocentesis however follow-up imaging showed decreased size of pericardial effusion and this is now on hold.  Patient will eventually need transesophageal echocardiogram to rule out endocarditis but this is on hold due to patient recent hypotension in the ICU.  He was placed on colchicine and prednisone for pericarditis.  Patient has required IV amnio as well as initiation of heparin drip for new onset A-fib with RVR.     Augustus Coffman was evaluated by PT, OT and now by PMR physician and found to have new and significant deficits in ADLs and mobility.       CLOF:  AM-PAC Care scores - significant assist 2 - LBD, bathing, toileting; slight assist UBD/grroming  Transfers and ambulation 50 ft min assist (2nd per CF/line mgmt)     Disposition recommendation:  [facility]/IRF   The patient with following culmination of conditions MAYBE an appropriate candidate for transfer to [facility]/IRF pending:   - Medical clearance, insurance auth, facility acceptance, bed availability    Impaired mobility and ADLs  Acute respiratory failure with hypoxia  C5-C6 discitis/osteomyelitis with epidural abscess/phlegmon  Septic shock  Hypotension  MSSA bacteremia  Pericardial effusion  Pericarditis  Acute on chronic kidney injury  A-fib with RVR  Diabetic right foot ulcer  Diabetes mellitus 2  Diabetic peripheral neuropathy  Morbid obesity  B cell lymphoma     Impaired mobility and ADLs - improving   Multifactorial: See above  - Optimal management of each as listed   - Continue PT, OT in acute rehab setting  - Optimal nutrition, hydration, and medical management  - Monitor vitals closely with and without activity  - Fall precautions      Acute respiratory failure with hypoxia - improving   - Overall mgmt per primary team   - Optimal management of pericardial effusion, pericarditis, infection, acute on chronic kidney disease  - Weaning down now less than 6L   - Monitor lung exam, vitals  "with and without activity  - Encourage incentive spirometry and mobilization  - Optimal mgmt      C5-C6 discitis/osteomyelitis with epidural abscess/phlegmon  -Repeat MRI C spin 8/30-MRI C spine shows stable discitis/OM with small anterior paravert abscess without epidural abscess or phelgmom  -IV Ancef reduced dose by ID   -Monitor neuroexam, labs, vitals closely  -Cervical collar, cervical precautions  -Repeat x-ray in 4 weeks per neurosurgery and MRI at discretion of ID and neurosurgery     Hypotension (hx of septic shock)  -Optimal mgmt of infection, acute on CKD, cardiac issues   -IV albumin per nephro  -Also on diuretics per nephro  -Monitor vitals closely (if needed orthostatic vitals)  -Abdominal binder/MORRIS hose PRN   -Monitor labs (Hb, BUN/Cr, etc) intermittently      MSSA bacteremia  - IV Ancef per ID  -Monitor neuroexam, labs, vitals closely  -Per cards \"would wait until C-spine is stable and patient no longer in cervical collar (can be done as outpt in approx 6 weeks). \" To eval for endocarditis     Pericardial effusion, Pericarditis  -Prednisone weaned off (colchicine stopped)  -Monitor cardiopulm exam, vitals     Acute on chronic kidney injury-improving   - Management per nephrology currently   - Recommend optimal mgmt during rehab process as well   - Monitor BUN/Cr intermittently as well as electrolytes   - Limit nephrotoxic agents when possible  - Optimal BP mgmt      A-fib with RVR  - Mgmt currently per primary team   - Rate control: amio gtt > can be transitioned to PO on d/c, MTP  - Antithrombotic: hep gtt  - OP Cards follow-up     Diabetic right foot ulcer  -mgmt per pods  -LWC, WBAT, sx shoe     Diabetes mellitus 2  - Current management by primary team  - Monitor for signs and symptoms of hypoglycemia   - Current meds: per primary  - Consistent carb diabetic diet  - Recommend close monitoring and optimal management during recovery/rehab phase as well      Diabetic peripheral neuropathy  - " Optimal DM mgmt  - Monitor skin for increased infection/breakdown/wounds which patient is at higher risk for  - Skilled PT/OT   - Fall precautions     Morbid obesity  -  on appropriate nutrition and activity  - Adjustments accordingly during skilled therapy   - Monitor skin closely for breakdown, wounds, rashes; keep clean and dry, turning Q2H   - Follow-up with PCP after d/c     Follicular lymphoma who grade 1-2, dx'd 7/2021   - Per OP H-O  - treated c/ 6 cycles of Bendamustine + Rituxan, Restaging PET/CT 2/20/2023 showed no metabolic activity.  With plan for new restaging 6 months from 11/2023 appt?  - Query if any of patient's pericardial effusion/pericarditis can be related to lymphoma, per literature it is rare but can happen  - Discuss with cards/H-O if applicable     Objective:    Allergies per EMR    Physical Exam:  Temp:  [97.3 °F (36.3 °C)-98.2 °F (36.8 °C)] 98.1 °F (36.7 °C)  HR:  [68-78] 69  Resp:  [15-20] 18  BP: (116-138)/(62-84) 131/84  SpO2:  [96 %-100 %] 98 %    GEN:  Sitting in NAD   HEENT/NECK: MMM, NC  CARDIAC: Reg rate  LUNGS:  Nonlabored  ABDOMEN: Soft, somewhat distended, nontender, slightly decreased sounds  EXTREMITIES/SKIN:  Slight LE edema without calf TTP  NEURO:   MENTAL STATUS: awake, oriented to person, place, time, and situation, MENTAL STATUS:  Appropriate wakefulness and interaction , and Strength/MMT:  Near full throughout  PSYCH:  Affect:  Euthymic     Diagnostic Studies: Reviewed  MRI cervical spine w wo contrast   Final Result by Becky Norwood MD (08/30 8623)      Compared to 10 days ago, persistent findings of C5-C6 osteodiscitis.      Small anterior paravertebral abscess. No epidural abscess or phlegmon.      Other stable, nonemergent findings above.                        Workstation performed: [id number]         IR tunneled central line placement   Final Result by Noah Heath MD (08/29 4844)   Impression: Successful image guided placement of tunneled central  venous catheter.         Workstation performed: [id number]         MRI lumbar spine wo contrast   Final Result by Duc Justice MD (08/27 0748)      No discitis osteomyelitis involving the thoracic spine. Minimal thoracic spondylosis, as described above.      No discitis osteomyelitis involving the lumbar spine.      Multilevel lumbar spondylosis, as described above, contributing to at most moderate canal stenosis, right greater than left lateral recess stenosis, moderate to severe right and mild left neural foraminal stenosis at L4-L5.      Discitis osteomyelitis again demonstrated at C5-C6, on the localizer/sagittal vertebral counting images.      Right pleural effusion.      Workstation performed: [id number]         MRI thoracic spine wo contrast   Final Result by Duc Justice MD (08/27 0748)      No discitis osteomyelitis involving the thoracic spine. Minimal thoracic spondylosis, as described above.      No discitis osteomyelitis involving the lumbar spine.      Multilevel lumbar spondylosis, as described above, contributing to at most moderate canal stenosis, right greater than left lateral recess stenosis, moderate to severe right and mild left neural foraminal stenosis at L4-L5.      Discitis osteomyelitis again demonstrated at C5-C6, on the localizer/sagittal vertebral counting images.      Right pleural effusion.      Workstation performed: [id number]         CT chest abdomen pelvis wo contrast   Final Result by Oneil Desai MD (08/26 1615)   1.  New moderate nonspecific pericardial effusion and small bilateral pleural effusions. No other definitive manifestations of acute infection-allowing for the limitations of noncontrast CT   2.  Complex exophytic right renal cystic lesion, incompletely assessed by noncontrast CT. Nonemergent follow-up gadolinium enhanced MRI of the abdomen is recommended for further evaluation.      3.  Please refer to the report body for description of other incidental  chronic and/or benign findings.               Workstation performed: [id number]         XR spine cervical 2 or 3 vw injury   Final Result by Bill Ferreira MD (08/26 0722)      Destruction of the C5 to space with collapse of the superior endplate of C6 consistent with discitis osteomyelitis which has progressed from the prior studies.      Increased soft tissue swelling anterior to C5-6 level.      Left central venous catheter terminating at the confluence of the innominate veins.         Workstation performed: [id number]         XR spine cervical 2 or 3 vw injury    (Results Pending)       Laboratory: Labs reviewed  Results from last 7 days   Lab Units 08/29/24  0414 08/28/24  0500 08/27/24  0500   HEMOGLOBIN g/dL 11.5* 11.4* 10.9*   HEMATOCRIT % 38.1 37.6 34.6*   WBC Thousand/uL 15.12* 12.78* 11.51*     Results from last 7 days   Lab Units 08/30/24  0539 08/29/24  0414 08/28/24  0000 08/27/24  1340 08/27/24  0444 08/26/24  0508   BUN mg/dL 79* 91* 87*   < > 90* 70*   SODIUM mmol/L 138 136 138   < > 136 139   POTASSIUM mmol/L 4.0 3.6 3.5   < > 3.2* 4.0   CHLORIDE mmol/L 94* 91* 93*   < > 89* 91*   CREATININE mg/dL 1.40* 1.66* 1.83*   < > 2.59* 2.73*   AST U/L  --   --  99*  --  257* 513*   ALT U/L  --   --  7  --  15 33    < > = values in this interval not displayed.     Results from last 7 days   Lab Units 08/25/24  0405   PROTIME seconds 18.9*   INR  1.56*        Drug regimen reviewed, all potential adverse effects identified and addressed:    Scheduled Meds:  Current Facility-Administered Medications   Medication Dose Route Frequency Provider Last Rate    acetaminophen  975 mg Oral Q6H [facility] Ophelia Leon PA-C      amiodarone (CORDARONE) 900 mg in dextrose 5 % 500 mL infusion  0.5 mg/min Intravenous Continuous Ophelia Leon PA-C 0.5 mg/min (08/29/24 1852)    amiodarone  200 mg Oral BID With Meals Ophelia Leon PA-C      apixaban  5 mg Oral BID Ophelia Leon PA-C      calcium carbonate  1,000 mg Oral Daily PRN  Ophelia Leon PA-C      cefazolin  2,000 mg Intravenous Q8H Analisa Faulkner MD      chlorhexidine  15 mL Mouth/Throat Q12H SRIDHAR Ophelia Leon PA-C      HYDROmorphone  0.5 mg Intravenous Q3H PRN Ophelia Leon PA-C      insulin lispro  1-5 Units Subcutaneous TID AC Ophelia Leon PA-C      insulin lispro  1-5 Units Subcutaneous HS Ophelia Leon PA-C      methocarbamol  750 mg Oral Q6H SRIDHAR Ophelia Leon PA-C      metoprolol tartrate  25 mg Oral Q12H SRIDHAR Ophelia Leon PA-C      ondansetron  4 mg Intravenous Q6H PRN Ophelia Leon PA-C      oxyCODONE  10 mg Oral Q4H PRN Ophelia Leon PA-C      oxyCODONE  5 mg Oral Q4H PRN Ophelia Leon PA-C      polyethylene glycol  17 g Oral Daily Ophelia Leon PA-C      [START ON 8/31/2024] potassium chloride  20 mEq Oral Daily Yokasta Flores MD      senna-docusate sodium  2 tablet Oral BID Ophelia Leon PA-C         ** Please Note: Fluency Direct voice to text software may have been used in the creation of this document. **    Thank you for allowing the PM&R service to participate in the care of this patient. We will continue to follow. Please do not hesitate to call with questions or concerns.     Chris Rodriguez MD, MS  [facility]  Physical Medicine and Rehabilitation  Brain Injury Medicine

## 2024-08-30 NOTE — PROGRESS NOTES
[facility]  Progress Note  Name: Augustus Coffman I  MRN: [id number]  Unit/Bed#: [facility] 505-01 I Date of Admission: 8/23/2024   Date of Service: 8/30/2024 I Hospital Day: 7    Assessment & Plan   * MSSA bacteremia  Assessment & Plan  Patient with prior admission with MSSA bacteremia on 7/28 treated with 7 days of antibiotics  Etiology likely 2/2 right foot ulcer  Complicated by cervical discitis  TTE without evidence of endocarditis     Plan:  ID consulted, appreciate their recommendations  Plan for ancef x 6 weeks  Repeat blood cultures sent given episodes of diaphoresis/chills  8/23 Blood cultures: no growth to date  MRSA swab negative  UA with micro unremarkable for infection   MRI - Discitis osteomyelitis again demonstrated at C5-C6, will repeat MRI C spine to evaluate for meningeal enhancement  Pericardial drain deferred by interventional cardiology  LUCRECIA deferred by cardiology, - will obtain in the future when c collar is discontinued    Acute osteomyelitis of cervical spine (HCC)  Assessment & Plan  8/20 MRI C-Spine: Imaging findings suspicious for discitis osteomyelitis at C5-C6. 3 mm epidural phlegmon/small abscess is also suspected. There is moderate resultant central stenosis and mild mass effect on the cord  8/27 MRI - Discitis osteomyelitis again demonstrated at C5-C6   Neurosurgery consulted   Upright xrays completed  Recommending wearing of C-collar which is currently in place  Recommending 6 weeks IV antibiotics and follow-up repeat imaging  Monitor neurologic checks  8/29 MRI cervical spine with stable findings and no meningeal enhancement    Acute pericarditis  Assessment & Plan  8/22 Echo: Limited echo for assessment of endocarditis.  The patient's aortic valve is difficult to assess due to calcification but there is no new significant regurgitation.  The mitral valve has hyperechoic thickening at the tips consistent with most likely calcification, these were  seen on the echocardiograms in July.  The tricuspid valve similarly was not well-visualized but no new regurgitation.  The pulmonary valve was not well-visualized. Off note patient has new moderate pericardial effusion without specific echocardiographic signs of tamponade  Patient with symptoms consistent with pericarditis  8/27 TTE - EF 50%, mod focal calcification mitral valve, moderate circumferential pericardial effusion, fluid with fibrinous appearance, no sign of tamponade         Plan:  Cardiology consulted, no plans for drainage   Completed Colchicine and Prednisone on 8/27  Close hemodynamic monitoring  Outpatient follow up with cardiology for monitoring of effusion    Type 2 diabetes mellitus (HCC)  Assessment & Plan  Lab Results   Component Value Date    HGBA1C 6.8 (H) 07/05/2024       Recent Labs     08/29/24  1625 08/29/24  2035 08/30/24  0533 08/30/24  1032   POCGLU 203* 140 135 161*         Blood Sugar Average: Last 72 hrs:  (P) 149.72  Home regimen: metformin  Continue diabetic diet and sliding scale coverage  Goal blood glucose 140-180      FINA (acute kidney injury) on CKD stage 2([facility])  Assessment & Plan  Baseline Cr 1-1.2  Developed FINA 8/23 with associated hyperkalemia     Plan:  Nephrology following, appreciate their recommendations  Villa removed  Avoid nephrotoxins  Strict I/Os  Trend renal function - improved  Outpatient follow up with nephrology      New onset a-fib (HCC)  Assessment & Plan  Developed new onset afib 8/21/8/22     Plan:  Amiodarone infusion running, goal to load to 8-10g prior to discontinuation  Amiodarone 200 mg PO BID  Metoprolol 25 mg BID -can uptitrate as BP tolerates   Eliquis for AC  Telemetry monitoring     History of hypertension  Assessment & Plan  BP is acceptable, continue current regimen    Constipation  Assessment & Plan  Continue bowel regimen  monitor    Diabetic ulcer of right foot (HCC)  Assessment & Plan  Lab Results   Component Value Date    HGBA1C 6.8 (H)  2024       Recent Labs     24  1625 24  2035 24  0533 24  1032   POCGLU 203* 140 135 161*         Blood Sugar Average: Last 72 hrs:  (P) 149.72  Follows as an outpatient with wound care  Likely source of MSSA bacteremia   Continue local wound care  Podiatry consulted  WBAT    Transaminitis  Assessment & Plan  Likely reactive to his infection  Improving gradually  Monitor LFTs periodically until resolved             VTE Pharmacologic Prophylaxis: VTE Score: 3 Moderate Risk (Score 3-4) - Pharmacological DVT Prophylaxis Ordered: apixaban (Eliquis).    Mobility:   Basic Mobility Inpatient Raw Score: 17  JH-HLM Goal: 5: Stand one or more mins  JH-HLM Achieved: 7: Walk 25 feet or more  JH-HLM Goal achieved. Continue to encourage appropriate mobility.    Patient Centered Rounds: I performed bedside rounds with nursing staff today.   Discussions with Specialists or Other Care Team Provider: nurseKARINA    Total Time Spent on Date of Encounter in care of patient: 40 mins. This time was spent on one or more of the following: performing physical exam; counseling and coordination of care; obtaining or reviewing history; documenting in the medical record; reviewing/ordering tests, medications or procedures; communicating with other healthcare professionals and discussing with patient's family/caregivers.    Current Length of Stay: 7 day(s)  Current Patient Status: Inpatient   Certification Statement: The patient will continue to require additional inpatient hospital stay due to amiodarone loading  Discharge Plan: Anticipate discharge in 48 hrs to rehab facility.    Code Status: Level 1 - Full Code    Subjective:   Denies any new complaints. Has neck pain.    Objective:     Vitals:   Temp (24hrs), Av.9 °F (36.6 °C), Min:97.3 °F (36.3 °C), Max:98.2 °F (36.8 °C)    Temp:  [97.3 °F (36.3 °C)-98.2 °F (36.8 °C)] 98.1 °F (36.7 °C)  HR:  [68-78] 69  Resp:  [15-20] 18  BP: (116-138)/(62-84) 131/84  SpO2:   [96 %-100 %] 98 %  Body mass index is 30.89 kg/m².     Input and Output Summary (last 24 hours):     Intake/Output Summary (Last 24 hours) at 8/30/2024 1302  Last data filed at 8/30/2024 0200  Gross per 24 hour   Intake 793.8 ml   Output 710 ml   Net 83.8 ml       Physical Exam:   Physical Exam  Constitutional:       Appearance: Normal appearance.   HENT:      Head: Normocephalic and atraumatic.      Nose: Nose normal.   Eyes:      Extraocular Movements: Extraocular movements intact.   Neck:      Comments: C-collar in place  Cardiovascular:      Rate and Rhythm: Normal rate. Rhythm irregular.   Pulmonary:      Effort: Pulmonary effort is normal.      Breath sounds: No wheezing or rales.   Skin:     General: Skin is warm and dry.   Neurological:      Mental Status: He is alert and oriented to person, place, and time.   Psychiatric:         Mood and Affect: Mood normal.         Behavior: Behavior normal.          Additional Data:     Labs:  Results from last 7 days   Lab Units 08/29/24  0414 08/28/24  0500 08/27/24  0500 08/26/24  0455   WBC Thousand/uL 15.12*   < > 11.51* 14.22*   HEMOGLOBIN g/dL 11.5*   < > 10.9* 11.2*   HEMATOCRIT % 38.1   < > 34.6* 36.9   PLATELETS Thousands/uL 415*   < > 432* 434*   SEGS PCT %  --   --   --  84*   LYMPHO PCT %  --   --  8* 5*   MONO PCT %  --   --  7 9   EOS PCT %  --   --  0 0    < > = values in this interval not displayed.     Results from last 7 days   Lab Units 08/30/24  0539 08/29/24  0414 08/28/24  0000   SODIUM mmol/L 138   < > 138   POTASSIUM mmol/L 4.0   < > 3.5   CHLORIDE mmol/L 94*   < > 93*   CO2 mmol/L 32   < > 32   BUN mg/dL 79*   < > 87*   CREATININE mg/dL 1.40*   < > 1.83*   ANION GAP mmol/L 12   < > 13   CALCIUM mg/dL 9.8   < > 9.5   ALBUMIN g/dL  --   --  3.5   TOTAL BILIRUBIN mg/dL  --   --  0.51   ALK PHOS U/L  --   --  218*   ALT U/L  --   --  7   AST U/L  --   --  99*   GLUCOSE RANDOM mg/dL 138   < > 137    < > = values in this interval not displayed.      Results from last 7 days   Lab Units 08/25/24  0405   INR  1.56*     Results from last 7 days   Lab Units 08/30/24  1032 08/30/24  0533 08/29/24  2035 08/29/24  1625 08/29/24  0633 08/28/24  2056 08/28/24  1542 08/28/24  1050 08/28/24  1001 08/28/24  0810 08/28/24  0604 08/28/24  0424   POC GLUCOSE mg/dl 161* 135 140 203* 153* 155* 208* 191* 178* 134 129 144*         Results from last 7 days   Lab Units 08/24/24  0201 08/23/24  1939 08/23/24  1634   LACTIC ACID mmol/L 1.9 3.0* 2.5*       Lines/Drains:  Invasive Devices       Central Venous Catheter Line  Duration             CVC Central Lines 08/29/24 Right Other (Comment) 1 day                    Central Line:  Goal for removal: Will discontinue when meds requiring line are completed.             Imaging: Reviewed radiology reports from this admission including: MRI spine    Recent Cultures (last 7 days):   Results from last 7 days   Lab Units 08/23/24 2055 08/23/24 2011   BLOOD CULTURE  No Growth After 5 Days. No Growth After 5 Days.       Last 24 Hours Medication List:   Current Facility-Administered Medications   Medication Dose Route Frequency Provider Last Rate    acetaminophen  975 mg Oral Q6H SRIDHAR Leon PA-C      amiodarone (CORDARONE) 900 mg in dextrose 5 % 500 mL infusion  0.5 mg/min Intravenous Continuous Ophelia Leon PA-C 0.5 mg/min (08/29/24 1852)    amiodarone  200 mg Oral BID With Meals Ophelia Leon PA-C      apixaban  5 mg Oral BID Ophelia Leon PA-C      calcium carbonate  1,000 mg Oral Daily PRN Ophelia Leon PA-C      cefazolin  2,000 mg Intravenous Q8H Analisa Faulkner MD      chlorhexidine  15 mL Mouth/Throat Q12H SRIDHAR Ophelia Leon PA-C      HYDROmorphone  0.5 mg Intravenous Q3H PRN Ophelia Leon PA-C      insulin lispro  1-5 Units Subcutaneous TID AC Ophelia Leon PA-C      insulin lispro  1-5 Units Subcutaneous HS Ophelia Leon PA-C      methocarbamol  750 mg Oral Q6H [facility] Ophelia Leon PA-C      metoprolol tartrate  25 mg Oral  Q12H [facility] Ophelia Leon PA-C      ondansetron  4 mg Intravenous Q6H PRN Ophelia Leon PA-C      oxyCODONE  10 mg Oral Q4H PRN Ophelia Leon PA-C      oxyCODONE  5 mg Oral Q4H PRN Ophelia Leon PA-C      polyethylene glycol  17 g Oral Daily Ophelia Leon PA-C      [START ON 8/31/2024] potassium chloride  20 mEq Oral Daily Yokasta Flores MD      senna-docusate sodium  2 tablet Oral BID Ophelia Leon PA-C          Today, Patient Was Seen By: Tom Andre MD    **Please Note: This note may have been constructed using a voice recognition system.**

## 2024-08-30 NOTE — PROGRESS NOTES
Cardiology Progress Note - Augustus Coffman 68 y.o. male MRN: [id number]    Unit/Bed#: [facility] 505-01 Encounter: [id number]      Assessment:  Principal Problem:    MSSA bacteremia  Active Problems:    History of hypertension    Diabetic peripheral neuropathy (HCC)    Type 2 diabetes mellitus (HCC)    FINA (acute kidney injury) on CKD stage 2(HCC)    Acute osteomyelitis of cervical spine (HCC)    Transaminitis    New onset a-fib (HCC)    Acute pericarditis    Diabetic ulcer of right foot (HCC)    Constipation    Assessment/Plan:      Acute pericarditis with moderate pericardial effusion  -Given prolonged Staph aureus bacteremia, concern that this may be purulent pericarditis  -TTE on 8/22 showed moderate pericardial effusion without findings of tamponade and mitral valve with hyperechoic thickening at the tips consistent with most likely calcification  --Repeat TTE on 8/24 showed LVEF 45%, again no findings of tamponade  --Thoracic surgery evaluated, no plan for intervention as long as echo continues to show no tamponade physiology  - Repeat TTE on 8/27 showed LVEF 50%, no findings of tamponade and moderate sized pericardial effusion, stable in size compared to last echo  --May need eventual LUCRECIA to rule out endocarditis however no urgent need at this time. Blood cultures from 8/23 remain no growth and also pt is currently a poor candidate for intubation due to his cervical collar, and management also would not change since pt is already on a 6 week course of abx. So will hold off on LUCRECIA for now.  --Per ID, recommend getting a pericardiocentesis for fluid cultures/cell counts. However, discussed with interventional cardiology and the risks of performing a pericardiocentesis on this pt outweigh the benefits at this time. Would expect presentation to be more severe if this were truly purulent pericarditis.  - Completed course of colchicine and prednisone for pericarditis     2.  New onset atrial fibrillation with RVR  -  "Switched from metoprolol tartrate to amiodarone on 8/23 due to new hypotension in setting of distributive shock. Resumed metoprolol tartrate 25 mg BID on 8/26 as BP improved and pt remained tachycardic  -- Pt noted to be in atrial flutter on 8/27, so loaded with IV amiodarone 150 mg bolus and started on amio gtt.   -- Continue amiodarone gtt - as of 8 AM today, patient has received 6.21 g of amiodarone in all. Will continue to load with amio gtt until pt has received ~ 8-10 g in all and until he is in NSR for ~ 24 hours, and then can d/c gtt and switch to maintenance dosing with oral amiodarone.  -- Continue amiodarone 200 mg BID  -- Continue Lopressor 25 mg BID  - Continue Eliquis 5 mg BID for anticoagulation  - Continue to monitor heart rate trends on telemetry     3.  MSSA bacteremia with cervical discitis/osteomyelitis  - Further management per infectious disease and primary team  -- On Ancef 2 g q6h     4.  Transaminitis  - Continue to monitor LFTs closely      5.  Hypertension  -- Continue lopressor 25 mg BID, amiodarone gtt, and amiodarone 200 mg BID  - Continue to monitor blood pressures closely      6.  Thoracic aortic aneurysm  - Continue with periodic surveillance     7. Aortic stenosis  - Continue periodic outpatient surveillance        Case discussed and reviewed with Dr. Rayo. Please wait for final recommendations from Dr. Rayo. Thank you for involving us in the care of your patient.       Subjective:   Patient seen and examined.  No significant events overnight.        Objective:     Vitals: Blood pressure 134/84, pulse 75, temperature 98.1 °F (36.7 °C), temperature source Oral, resp. rate 18, height 6' 3\" (1.905 m), weight 113 kg (248 lb 10.9 oz), SpO2 99%., Body mass index is 31.08 kg/m².,   Orthostatic Blood Pressures      Flowsheet Row Most Recent Value   Blood Pressure 134/84 filed at 08/30/2024 0724   Patient Position - Orthostatic VS Sitting filed at 08/30/2024 0724      "         Intake/Output Summary (Last 24 hours) at 2024 0831  Last data filed at 2024 0200  Gross per 24 hour   Intake 1100.6 ml   Output 1710 ml   Net -609.4 ml       No significant arrhythmias seen on telemetry review. Telemetry only showing events from 1:40 AM onwards today, no data available from prior to that. Patient in NSR with HR in 60s-80s since then.      Physical Exam:    GEN: Augustus Coffman appears well, alert and oriented x 3, pleasant and cooperative   HEENT: pupils equal, round, and reactive to light; extraocular muscles intact  NECK: supple, no carotid bruits   HEART: regular rhythm, normal S1 and S2, no murmurs, clicks, gallops or rubs   LUNGS: clear to auscultation bilaterally; no wheezes, rales, or rhonchi   ABDOMEN: normal bowel sounds, soft, no tenderness, no distention  EXTREMITIES: peripheral pulses normal; no clubbing, cyanosis, or edema  NEURO: no focal findings   SKIN: normal without suspicious lesions on exposed skin    Medications:      Current Facility-Administered Medications:     acetaminophen (TYLENOL) tablet 975 mg, 975 mg, Oral, Q6H SRIDHAR, Ophelia Leon PA-C, 975 mg at 24 0524    [] amiodarone (CORDARONE) 900 mg in dextrose 5 % 500 mL infusion, 1 mg/min, Intravenous, Continuous, Stopped at 24 **FOLLOWED BY** amiodarone (CORDARONE) 900 mg in dextrose 5 % 500 mL infusion, 0.5 mg/min, Intravenous, Continuous, Ophelia Leon PA-C, Last Rate: 16.7 mL/hr at 24 185, 0.5 mg/min at 24    amiodarone tablet 200 mg, 200 mg, Oral, BID With Meals, Ophelia Leon PA-C, 200 mg at 24    apixaban (ELIQUIS) tablet 5 mg, 5 mg, Oral, BID, Ophelia Leon PA-C, 5 mg at 24 0813    calcium carbonate (TUMS) chewable tablet 1,000 mg, 1,000 mg, Oral, Daily PRN, Ophelia Leon PA-C    ceFAZolin (ANCEF) IVPB (premix in dextrose) 2,000 mg 50 mL, 2,000 mg, Intravenous, Q8H, Analisa Faulkner MD    chlorhexidine (PERIDEX) 0.12 % oral rinse 15 mL, 15 mL,  Mouth/Throat, Q12H SRIDHAR, Ophelia Leon PA-C, 15 mL at 08/30/24 0813    HYDROmorphone (DILAUDID) injection 0.5 mg, 0.5 mg, Intravenous, Q3H PRN, Ophelia Leon PA-C, 0.5 mg at 08/30/24 0027    insulin lispro (HumALOG/ADMELOG) 100 units/mL subcutaneous injection 1-5 Units, 1-5 Units, Subcutaneous, TID AC, 1 Units at 08/29/24 1727 **AND** Fingerstick Glucose (POCT), , , TID AC, Ophelia Leon PA-C    insulin lispro (HumALOG/ADMELOG) 100 units/mL subcutaneous injection 1-5 Units, 1-5 Units, Subcutaneous, HS, Ophelia Leon PA-C, 1 Units at 08/28/24 2122    methocarbamol (ROBAXIN) tablet 750 mg, 750 mg, Oral, Q6H SRIDHAR, Ophelia Leon PA-C, 750 mg at 08/30/24 0813    metoprolol tartrate (LOPRESSOR) tablet 25 mg, 25 mg, Oral, Q12H SRIDHAR, Ophelia Leon PA-C, 25 mg at 08/30/24 0813    ondansetron (ZOFRAN) injection 4 mg, 4 mg, Intravenous, Q6H PRN, Ophelia Leon PA-C, 4 mg at 08/29/24 2051    oxyCODONE (ROXICODONE) immediate release tablet 10 mg, 10 mg, Oral, Q4H PRN, Ophelia Leon PA-C, 10 mg at 08/30/24 0539    oxyCODONE (ROXICODONE) IR tablet 5 mg, 5 mg, Oral, Q4H PRN, Ophelia Leon PA-C, 5 mg at 08/29/24 2334    polyethylene glycol (MIRALAX) packet 17 g, 17 g, Oral, Daily, Ophelia Leon PA-C, 17 g at 08/29/24 0737    potassium chloride (Klor-Con M20) CR tablet 40 mEq, 40 mEq, Oral, Daily, Ophelia Leon PA-C, 40 mEq at 08/30/24 0813    senna-docusate sodium (SENOKOT S) 8.6-50 mg per tablet 2 tablet, 2 tablet, Oral, BID, Ophelia Leon PA-C, 2 tablet at 08/30/24 0813     Labs & Results:    Results from last 7 days   Lab Units 08/24/24  0201   CK TOTAL U/L 22*     Results from last 7 days   Lab Units 08/29/24  0414 08/28/24  0500 08/27/24  0500   WBC Thousand/uL 15.12* 12.78* 11.51*   HEMOGLOBIN g/dL 11.5* 11.4* 10.9*   HEMATOCRIT % 38.1 37.6 34.6*   PLATELETS Thousands/uL 415* 450* 432*         Results from last 7 days   Lab Units 08/30/24  0539 08/29/24  0414 08/28/24  0000 08/27/24  1340 08/27/24  0444 08/26/24  0508   POTASSIUM  mmol/L 4.0 3.6 3.5   < > 3.2* 4.0   CHLORIDE mmol/L 94* 91* 93*   < > 89* 91*   CO2 mmol/L 32 33* 32   < > 33* 30   BUN mg/dL 79* 91* 87*   < > 90* 70*   CREATININE mg/dL 1.40* 1.66* 1.83*   < > 2.59* 2.73*   CALCIUM mg/dL 9.8 9.8 9.5   < > 9.3 9.7   ALK PHOS U/L  --   --  218*  --  268* 302*   ALT U/L  --   --  7  --  15 33   AST U/L  --   --  99*  --  257* 513*    < > = values in this interval not displayed.     Results from last 7 days   Lab Units 08/28/24  0605 08/27/24  2313 08/27/24  1626 08/26/24  0453 08/25/24  0405   INR   --   --   --   --  1.56*   PTT seconds 56* 55* 34   < > 61*    < > = values in this interval not displayed.     Results from last 7 days   Lab Units 08/29/24  0414 08/28/24  0000 08/27/24  0444   MAGNESIUM mg/dL 2.0 2.2 2.1

## 2024-08-30 NOTE — ARC ADMISSION
Upon review of patient's case, patient is deemed ARC preapproved / appropriate once medically ready ( including - O2 improves preferably 4l or below at rest  / Amidarone IV d/c'd  / continued functional need / bed avail.CM made aware in Aidin .

## 2024-08-30 NOTE — ASSESSMENT & PLAN NOTE
Patient with prior admission with MSSA bacteremia on 7/28 treated with 7 days of antibiotics  Etiology likely 2/2 right foot ulcer  Complicated by cervical discitis  TTE without evidence of endocarditis     Plan:  ID consulted, appreciate their recommendations  Plan for ancef x 6 weeks  Repeat blood cultures sent given episodes of diaphoresis/chills  8/23 Blood cultures: no growth to date  MRSA swab negative  UA with micro unremarkable for infection   MRI - Discitis osteomyelitis again demonstrated at C5-C6, will repeat MRI C spine to evaluate for meningeal enhancement  Pericardial drain deferred by interventional cardiology  LUCRECIA deferred by cardiology, - will obtain in the future when c collar is discontinued

## 2024-08-30 NOTE — ASSESSMENT & PLAN NOTE
Lab Results   Component Value Date    HGBA1C 6.8 (H) 07/05/2024       Recent Labs     08/29/24  1625 08/29/24 2035 08/30/24  0533 08/30/24  1032   POCGLU 203* 140 135 161*         Blood Sugar Average: Last 72 hrs:  (P) 149.72  Home regimen: metformin  Continue diabetic diet and sliding scale coverage  Goal blood glucose 140-180

## 2024-08-30 NOTE — PLAN OF CARE
Problem: Prexisting or High Potential for Compromised Skin Integrity  Goal: Skin integrity is maintained or improved  Description: INTERVENTIONS:  - Identify patients at risk for skin breakdown  - Assess and monitor skin integrity  - Assess and monitor nutrition and hydration status  - Monitor labs   - Assess for incontinence   - Turn and reposition patient  - Assist with mobility/ambulation  - Relieve pressure over bony prominences  - Avoid friction and shearing  - Provide appropriate hygiene as needed including keeping skin clean and dry  - Evaluate need for skin moisturizer/barrier cream  - Collaborate with interdisciplinary team   - Patient/family teaching  - Consider wound care consult   Outcome: Progressing     Problem: NEUROSENSORY - ADULT  Goal: Achieves stable or improved neurological status  Description: INTERVENTIONS  - Monitor and report changes in neurological status  - Monitor vital signs such as temperature, blood pressure, glucose, and any other labs ordered   - Initiate measures to prevent increased intracranial pressure  - Monitor for seizure activity and implement precautions if appropriate      Outcome: Progressing     Problem: CARDIOVASCULAR - ADULT  Goal: Maintains optimal cardiac output and hemodynamic stability  Description: INTERVENTIONS:  - Monitor I/O, vital signs and rhythm  - Monitor for S/S and trends of decreased cardiac output  - Administer and titrate ordered vasoactive medications to optimize hemodynamic stability  - Assess quality of pulses, skin color and temperature  - Assess for signs of decreased coronary artery perfusion  - Instruct patient to report change in severity of symptoms  Outcome: Progressing  Goal: Absence of cardiac dysrhythmias or at baseline rhythm  Description: INTERVENTIONS:  - Continuous cardiac monitoring, vital signs, obtain 12 lead EKG if ordered  - Administer antiarrhythmic and heart rate control medications as ordered  - Monitor electrolytes and  administer replacement therapy as ordered  Outcome: Progressing     Problem: RESPIRATORY - ADULT  Goal: Achieves optimal ventilation and oxygenation  Description: INTERVENTIONS:  - Assess for changes in respiratory status  - Assess for changes in mentation and behavior  - Position to facilitate oxygenation and minimize respiratory effort  - Oxygen administered by appropriate delivery if ordered  - Initiate smoking cessation education as indicated  - Encourage broncho-pulmonary hygiene including cough, deep breathe, Incentive Spirometry  - Assess the need for suctioning and aspirate as needed  - Assess and instruct to report SOB or any respiratory difficulty  - Respiratory Therapy support as indicated  Outcome: Progressing     Problem: GENITOURINARY - ADULT  Goal: Maintains or returns to baseline urinary function  Description: INTERVENTIONS:  - Assess urinary function  - Encourage oral fluids to ensure adequate hydration if ordered  - Administer IV fluids as ordered to ensure adequate hydration  - Administer ordered medications as needed  - Offer frequent toileting  - Follow urinary retention protocol if ordered  Outcome: Progressing  Goal: Absence of urinary retention  Description: INTERVENTIONS:  - Assess patient’s ability to void and empty bladder  - Monitor I/O  - Bladder scan as needed  - Discuss with physician/AP medications to alleviate retention as needed  - Discuss catheterization for long term situations as appropriate  Outcome: Progressing  Goal: Urinary catheter remains patent  Description: INTERVENTIONS:  - Assess patency of urinary catheter  - If patient has a chronic avilez, consider changing catheter if non-functioning  - Follow guidelines for intermittent irrigation of non-functioning urinary catheter  Outcome: Progressing     Problem: METABOLIC, FLUID AND ELECTROLYTES - ADULT  Goal: Electrolytes maintained within normal limits  Description: INTERVENTIONS:  - Monitor labs and assess patient for signs  and symptoms of electrolyte imbalances  - Administer electrolyte replacement as ordered  - Monitor response to electrolyte replacements, including repeat lab results as appropriate  - Instruct patient on fluid and nutrition as appropriate  Outcome: Progressing  Goal: Fluid balance maintained  Description: INTERVENTIONS:  - Monitor labs   - Monitor I/O and WT  - Instruct patient on fluid and nutrition as appropriate  - Assess for signs & symptoms of volume excess or deficit  Outcome: Progressing  Goal: Glucose maintained within target range  Description: INTERVENTIONS:  - Monitor Blood Glucose as ordered  - Assess for signs and symptoms of hyperglycemia and hypoglycemia  - Administer ordered medications to maintain glucose within target range  - Assess nutritional intake and initiate nutrition service referral as needed  Outcome: Progressing     Problem: SKIN/TISSUE INTEGRITY - ADULT  Goal: Skin Integrity remains intact(Skin Breakdown Prevention)  Description: Assess:  -Perform Iraj assessment every   -Clean and moisturize skin every  -Inspect skin when repositioning, toileting, and assisting with ADLS  -Assess under medical devices such as  every   -Assess extremities for adequate circulation and sensation     Bed Management:  -Have minimal linens on bed & keep smooth, unwrinkled  -Change linens as needed when moist or perspiring  -Avoid sitting or lying in one position for more than  hours while in bed  -Keep HOB at degrees     Toileting:  -Offer bedside commode  -Assess for incontinence every   -Use incontinent care products after each incontinent episode such as    Activity:  -Mobilize patient  times a day  -Encourage activity and walks on unit  -Encourage or provide ROM exercises   -Turn and reposition patient every  Hours  -Use appropriate equipment to lift or move patient in bed  -Instruct/ Assist with weight shifting every  when out of bed in chair  -Consider limitation of chair time hour intervals    Skin  Care:  -Avoid use of baby powder, tape, friction and shearing, hot water or constrictive clothing  -Relieve pressure over bony prominences using   -Do not massage red bony areas    Next Steps:  -Teach patient strategies to minimize risks such as    -Consider consults to  interdisciplinary teams such as   Outcome: Progressing  Goal: Incision(s), wounds(s) or drain site(s) healing without S/S of infection  Description: INTERVENTIONS  - Assess and document dressing, incision, wound bed, drain sites and surrounding tissue  - Provide patient and family education  - Perform skin care/dressing changes every   Outcome: Progressing  Goal: Pressure injury heals and does not worsen  Description: Interventions:  - Implement low air loss mattress or specialty surface (Criteria met)  - Apply silicone foam dressing  - Instruct/assist with weight shifting every  minutes when in chair   - Limit chair time to  hour intervals  - Use special pressure reducing interventions such as  when in chair   - Apply fecal or urinary incontinence containment device   - Perform passive or active ROM every  - Turn and reposition patient & offload bony prominences every  hours   - Utilize friction reducing device or surface for transfers   - Consider consults to  interdisciplinary teams such as   - Use incontinent care products after each incontinent episode such as  - Consider nutrition services referral as needed  Outcome: Progressing     Problem: HEMATOLOGIC - ADULT  Goal: Maintains hematologic stability  Description: INTERVENTIONS  - Assess for signs and symptoms of bleeding or hemorrhage  - Monitor labs  - Administer supportive blood products/factors as ordered and appropriate  Outcome: Progressing     Problem: MUSCULOSKELETAL - ADULT  Goal: Maintain or return mobility to safest level of function  Description: INTERVENTIONS:  - Assess patient's ability to carry out ADLs; assess patient's baseline for ADL function and identify physical deficits  which impact ability to perform ADLs (bathing, care of mouth/teeth, toileting, grooming, dressing, etc.)  - Assess/evaluate cause of self-care deficits   - Assess range of motion  - Assess patient's mobility  - Assess patient's need for assistive devices and provide as appropriate  - Encourage maximum independence but intervene and supervise when necessary  - Involve family in performance of ADLs  - Assess for home care needs following discharge   - Consider OT consult to assist with ADL evaluation and planning for discharge  - Provide patient education as appropriate  Outcome: Progressing  Goal: Maintain proper alignment of affected body part  Description: INTERVENTIONS:  - Support, maintain and protect limb and body alignment  - Provide patient/ family with appropriate education  Outcome: Progressing     Problem: Nutrition/Hydration-ADULT  Goal: Nutrient/Hydration intake appropriate for improving, restoring or maintaining nutritional needs  Description: Monitor and assess patient's nutrition/hydration status for malnutrition. Collaborate with interdisciplinary team and initiate plan and interventions as ordered.  Monitor patient's weight and dietary intake as ordered or per policy. Utilize nutrition screening tool and intervene as necessary. Determine patient's food preferences and provide high-protein, high-caloric foods as appropriate.     INTERVENTIONS:  - Monitor oral intake, urinary output, labs, and treatment plans  - Assess nutrition and hydration status and recommend course of action  - Evaluate amount of meals eaten  - Assist patient with eating if necessary   - Allow adequate time for meals  - Recommend/ encourage appropriate diets, oral nutritional supplements, and vitamin/mineral supplements  - Order, calculate, and assess calorie counts as needed  - Recommend, monitor, and adjust tube feedings and TPN/PPN based on assessed needs  - Assess need for intravenous fluids  - Provide specific  nutrition/hydration education as appropriate  - Include patient/family/caregiver in decisions related to nutrition  Outcome: Progressing

## 2024-08-30 NOTE — ASSESSMENT & PLAN NOTE
Developed new onset afib 8/21/8/22     Plan:  Amiodarone infusion running, goal to load to 8-10g prior to discontinuation  Amiodarone 200 mg PO BID  Metoprolol 25 mg BID -can uptitrate as BP tolerates   Eliquis for AC  Telemetry monitoring

## 2024-08-30 NOTE — CASE MANAGEMENT
Case Management Discharge Planning Note    Patient name Augustus Coffman  Location [facility] 505-01 MRN [id number]  : 1955 Date 2024       Current Admission Date: 2024  Current Admission Diagnosis:MSSA bacteremia   Patient Active Problem List    Diagnosis Date Noted Date Diagnosed    Constipation 2024     Acute pericarditis 2024     Diabetic ulcer of right foot ([facility]) 2024     MSSA bacteremia 2024     New onset a-fib ([facility]) 2024     Acute osteomyelitis of cervical spine ([facility]) 2024     Transaminitis 2024     Acute renal failure with oliguria  ([facility]) 2024     Neck pain 2024     Sepsis without acute organ dysfunction ([facility]) 2024     Acute respiratory failure with hypoxia ([facility]) 2024     Acute hyponatremia 2024     Chronic diastolic CHF (congestive heart failure) ([facility]) 2024     Encounter for deep vein thrombosis (DVT) prophylaxis 2024     Hyponatremia 2024     Aortic stenosis with trileaflet valve 07/15/2024     Ectatic thoracic aorta ([facility]) 2024     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 ([facility]) 10/09/2023     Vitamin D deficiency 2023     Stage 3a chronic kidney disease ([facility]) 2023     Follicular lymphoma grade I of lymph nodes of multiple sites ([facility]) 08/15/2022     Obesity, morbid ([facility]) 2022     Other proteinuria 2022     Stage 2 chronic kidney disease 09/15/2021     Hypertensive kidney disease with stage 2 chronic kidney disease 09/15/2021     Rash 2021     Chronic venous hypertension (idiopathic) with ulcer of right lower extremity ([facility]) 2021     GI bleed 2021     Pleural effusion 2021     Heart murmur 2021     Retroperitoneal hematoma 2021     Mesenteric lymphadenopathy 2021     Acute blood loss anemia 2021     FINA (acute kidney injury) on CKD stage 2([facility]) 2021     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     Stasis dermatitis  of both legs 04/17/2019     Hammer toe of right foot 02/19/2019     Obesity with body mass index 30 or greater 10/31/2016     Diabetes 1.5, 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     History of hypertension 10/06/2016     Hypercholesteremia 10/06/2016     Diabetic peripheral neuropathy (HCC) 11/10/2014     Gout 12/06/2007     Type 2 diabetes mellitus (HCC) 12/06/2007       LOS (days): 7  Geometric Mean LOS (GMLOS) (days): 5.1  Days to GMLOS:-1.7     OBJECTIVE:  Risk of Unplanned Readmission Score: 26.67         Current admission status: Inpatient   Preferred Pharmacy:   [id number] [place], [place] - [address]  [address] [address]  Phone: [phone] Fax: [phone]    [id number] [place] [facility] [address] [address]  Phone: [phone] Fax: [phone]    Primary Care Provider: Gwen Lazo DO    Primary Insurance: MEDICARE  Secondary Insurance: [facility]    DISCHARGE DETAILS:    Discharge planning discussed with:: daughter, Lauren via phone. patient at bedside  Freedom of Choice: Yes  Comments - Freedom of Choice: pt is not medically cleared for d/c. Pt has been accepted at [facility],  and  for acute rehab. TC to patient's dtrLauren (as requested) to discuss choices. She reports that initially she had wanted pt to d/c to  but after family discussion they decided they would like pt to go to [facility] so his doctors can continue to follow him. [facility] reserved in [place]. No auth will be needed for admission. Pt is requesting private room and that wife will be permitted to stay w/ pt. Message sent via [employer] to [facility] liaison regarding same. Dtr, Lauren was told by Erika in [facility] that wife will be permitted to stay with doctor's order. CM will continue to follow.  CM contacted family/caregiver?: Yes  Were Treatment Team discharge recommendations reviewed with patient/caregiver?: Yes  Did patient/caregiver  verbalize understanding of patient care needs?: Yes  Were patient/caregiver advised of the risks associated with not following Treatment Team discharge recommendations?: Yes    Contacts  Patient Contacts: patient/self, paulr/Lauren  Relationship to Patient:: Family  Contact Method: In Person, Phone  Reason/Outcome: Discharge Planning    Requested Home Health Care         Is the patient interested in [facility] at discharge?: No         Other Referral/Resources/Interventions Provided:  Interventions: Acute Rehab         Treatment Team Recommendation: Acute Rehab  Discharge Destination Plan:: Acute Rehab

## 2024-08-31 LAB
ANION GAP SERPL CALCULATED.3IONS-SCNC: 10 MMOL/L (ref 4–13)
BUN SERPL-MCNC: 65 MG/DL (ref 5–25)
CALCIUM SERPL-MCNC: 9.7 MG/DL (ref 8.4–10.2)
CHLORIDE SERPL-SCNC: 94 MMOL/L (ref 96–108)
CO2 SERPL-SCNC: 33 MMOL/L (ref 21–32)
CREAT SERPL-MCNC: 1.21 MG/DL (ref 0.6–1.3)
GFR SERPL CREATININE-BSD FRML MDRD: 61 ML/MIN/1.73SQ M
GLUCOSE SERPL-MCNC: 155 MG/DL (ref 65–140)
GLUCOSE SERPL-MCNC: 163 MG/DL (ref 65–140)
GLUCOSE SERPL-MCNC: 166 MG/DL (ref 65–140)
GLUCOSE SERPL-MCNC: 176 MG/DL (ref 65–140)
GLUCOSE SERPL-MCNC: 180 MG/DL (ref 65–140)
PHOSPHATE SERPL-MCNC: 2.8 MG/DL (ref 2.3–4.1)
POTASSIUM SERPL-SCNC: 4.4 MMOL/L (ref 3.5–5.3)
SODIUM SERPL-SCNC: 137 MMOL/L (ref 135–147)

## 2024-08-31 PROCEDURE — 84100 ASSAY OF PHOSPHORUS: CPT | Performed by: INTERNAL MEDICINE

## 2024-08-31 PROCEDURE — 99232 SBSQ HOSP IP/OBS MODERATE 35: CPT | Performed by: INTERNAL MEDICINE

## 2024-08-31 PROCEDURE — 80048 BASIC METABOLIC PNL TOTAL CA: CPT | Performed by: INTERNAL MEDICINE

## 2024-08-31 PROCEDURE — 82948 REAGENT STRIP/BLOOD GLUCOSE: CPT

## 2024-08-31 RX ADMIN — METHOCARBAMOL 750 MG: 750 TABLET ORAL at 09:42

## 2024-08-31 RX ADMIN — POLYETHYLENE GLYCOL 3350 17 G: 17 POWDER, FOR SOLUTION ORAL at 09:42

## 2024-08-31 RX ADMIN — AMIODARONE HYDROCHLORIDE 200 MG: 200 TABLET ORAL at 09:43

## 2024-08-31 RX ADMIN — POTASSIUM CHLORIDE 20 MEQ: 1500 TABLET, EXTENDED RELEASE ORAL at 09:42

## 2024-08-31 RX ADMIN — ACETAMINOPHEN 975 MG: 325 TABLET ORAL at 15:18

## 2024-08-31 RX ADMIN — ACETAMINOPHEN 975 MG: 325 TABLET ORAL at 22:09

## 2024-08-31 RX ADMIN — ACETAMINOPHEN 975 MG: 325 TABLET ORAL at 09:42

## 2024-08-31 RX ADMIN — INSULIN LISPRO 1 UNITS: 100 INJECTION, SOLUTION INTRAVENOUS; SUBCUTANEOUS at 16:36

## 2024-08-31 RX ADMIN — INSULIN LISPRO 1 UNITS: 100 INJECTION, SOLUTION INTRAVENOUS; SUBCUTANEOUS at 06:14

## 2024-08-31 RX ADMIN — AMIODARONE HYDROCHLORIDE 200 MG: 200 TABLET ORAL at 15:30

## 2024-08-31 RX ADMIN — CEFAZOLIN SODIUM 2000 MG: 2 SOLUTION INTRAVENOUS at 05:13

## 2024-08-31 RX ADMIN — ACETAMINOPHEN 975 MG: 325 TABLET ORAL at 03:59

## 2024-08-31 RX ADMIN — CEFAZOLIN SODIUM 2000 MG: 2 SOLUTION INTRAVENOUS at 22:09

## 2024-08-31 RX ADMIN — METHOCARBAMOL 750 MG: 750 TABLET ORAL at 03:59

## 2024-08-31 RX ADMIN — APIXABAN 5 MG: 5 TABLET, FILM COATED ORAL at 17:00

## 2024-08-31 RX ADMIN — CHLORHEXIDINE GLUCONATE 0.12% ORAL RINSE 15 ML: 1.2 LIQUID ORAL at 09:41

## 2024-08-31 RX ADMIN — METHOCARBAMOL 750 MG: 750 TABLET ORAL at 22:11

## 2024-08-31 RX ADMIN — METOPROLOL TARTRATE 25 MG: 25 TABLET, FILM COATED ORAL at 09:42

## 2024-08-31 RX ADMIN — INSULIN LISPRO 1 UNITS: 100 INJECTION, SOLUTION INTRAVENOUS; SUBCUTANEOUS at 12:28

## 2024-08-31 RX ADMIN — CHLORHEXIDINE GLUCONATE 0.12% ORAL RINSE 15 ML: 1.2 LIQUID ORAL at 22:10

## 2024-08-31 RX ADMIN — SENNOSIDES AND DOCUSATE SODIUM 2 TABLET: 50; 8.6 TABLET ORAL at 17:00

## 2024-08-31 RX ADMIN — AMIODARONE HYDROCHLORIDE 0.5 MG/MIN: 50 INJECTION, SOLUTION INTRAVENOUS at 01:21

## 2024-08-31 RX ADMIN — INSULIN LISPRO 1 UNITS: 100 INJECTION, SOLUTION INTRAVENOUS; SUBCUTANEOUS at 22:16

## 2024-08-31 RX ADMIN — METHOCARBAMOL 750 MG: 750 TABLET ORAL at 15:18

## 2024-08-31 RX ADMIN — SENNOSIDES AND DOCUSATE SODIUM 2 TABLET: 50; 8.6 TABLET ORAL at 09:42

## 2024-08-31 RX ADMIN — METOPROLOL TARTRATE 25 MG: 25 TABLET, FILM COATED ORAL at 22:15

## 2024-08-31 RX ADMIN — CEFAZOLIN SODIUM 2000 MG: 2 SOLUTION INTRAVENOUS at 13:28

## 2024-08-31 RX ADMIN — APIXABAN 5 MG: 5 TABLET, FILM COATED ORAL at 09:42

## 2024-08-31 NOTE — PROGRESS NOTES
Follow up Consultation    Nephrology   Augustus IRENA Coffman 68 y.o. male MRN: [id number]  Unit/Bed#: [facility] 505-01 Encounter: [id number]      Physician Requesting Consult: Tom Andre MD        ASSESSMENT:  68-year-old male with multiple comorbidities including diabetes, follicular lymphoma and recent MSSA bacteremia presented with abnormal MRI cervical spine as an outpatient.  Nephrology following for acute kidney injury.      Acute kidney injury :   FINA most likely secondary to ischemic injury secondary to ischemic injury secondary to hemodynamic perturbations plus new onset A-fib plus septic shock with subsequent ATN.  After review of records it appears that the patient has a baseline Creatinine of 0.9-1.1 mg/dL.  Admission creatinine of 1.36 mg/dL on 8/20/2024.  Creatinine today is at 1.09 mg/dL, stable and back at baseline  Dialysis consent in chart in case needed per my prior colleague   no acute indication for dialysis  Hold diuretics for now if worsening volume status may give as needed Bumex  Will need to see where new baseline creatinine will be in a period of 3 months this was discussed with the patient  Low threshold to give IV fluids if there is a rise in creatinine encourage patient p.o. hydration  check BMP, magnesium, phosphorus in a.m.  Place on a renal diet when allowed diet order.   Strict I/O.  Daily weights  Urinary retention protocol  Avoid nephrotoxins, adjust meds to appropriate GFR.  Stable for discharge from renal standpoint medically cleared will need outpatient follow-up and lab work upon discharge please let us know when he is for discharge so we can arrange  Outpatient follows up with Dr. PERRY Heath    H&H/anemia:  most recent hemoglobin at 11.5 g/dL  Maintain hemoglobin greater than 8 grams/deciliter    Acid-base electrolytes:  Hyponatremia: Restrict 1.2 L/day.  Most recent sodium stable 138 mill equivalents.  Resolved  Hyperkalemia: Most recent potassium 4.5 mEq stable resolved.    Blood  "pressure/A-fib/volume overload:   Optimize hemodynamic status to avoid delay in renal recovery.  Maintain MAP > 65mmHg  Avoid BP fluctuations.  Current medications:  metoprolol 25 mg p.o. every 12  Hold diuretics for now may give as needed Bumex if worsening volume status or decreased urine output  No changes for today    Other medical problems:  MSSA bacteremia:  Management per primary team.  In the setting of right foot ulcer concern for cervical discitis cervical osteomyelitis with paraspinal phlegmon/abscess.  On Ancef for total of 6 weeks  Pericardial effusion: Management per primary team follow-up with cardiology colchicine and prednisone discontinued   A-fib with RVR: Follow-up with cardiology, on amiodarone and heparin.     Plan below is what was discussed with the primary team that they agree with:  check BMP, magnesium, phosphorus in a.m. if still in hospital  Hold Bumex for now may give as needed diuretics if worsening volume status.  No acute indication for dialysis at this time  Stable for discharge from renal standpoint medically cleared please let us know when he is for discharge so we can arrange for outpatient follow-up  Low threshold to give IV fluids if worsening creatinine due to ongoing self diuresis  Message sent to the office to arrange for outpatient follow-up upon discharge will need lab work on 2024      Thanks for the consult  Will sign off  Please call with questions/ concerns.      Yokasta Flores MD, FASN, 2024, 9:51 AM                Objective :   Patient seen and examined in his room family at bedside, afebrile hemodynamically stable happy to hear renal parameters continue to improve and are back at baseline urine output 2.5 L last 24 hours awaiting to go to [facility].      PHYSICAL EXAM  /92   Pulse 88   Temp 98.2 °F (36.8 °C)   Resp 18   Ht 6' 3\" (1.905 m)   Wt 111 kg (245 lb 11.2 oz)   SpO2 97%   BMI 30.71 kg/m²   Temp (24hrs), Av.4 °F (36.9 °C), " Min:97.4 °F (36.3 °C), Max:99.4 °F (37.4 °C)        Intake/Output Summary (Last 24 hours) at 9/1/2024 0951  Last data filed at 9/1/2024 0900  Gross per 24 hour   Intake 750 ml   Output 2525 ml   Net -1775 ml       I/O last 24 hours:  In: 750 [P.O.:670; I.V.:30; IV Piggyback:50]  Out: 2525 [Urine:2525]      Current Weight: Weight - Scale: 111 kg (245 lb 11.2 oz)  First Weight: Weight - Scale: 121 kg (267 lb 13.7 oz)  Physical Exam  Vitals and nursing note reviewed.   Constitutional:       General: He is not in acute distress.     Appearance: Normal appearance. He is obese. He is not ill-appearing, toxic-appearing or diaphoretic.   HENT:      Head: Normocephalic and atraumatic.      Mouth/Throat:      Pharynx: No oropharyngeal exudate.   Eyes:      General: No scleral icterus.  Neck:      Comments: Neck collar in place  Cardiovascular:      Rate and Rhythm: Normal rate.   Pulmonary:      Effort: No respiratory distress.      Breath sounds: No stridor. No wheezing.   Abdominal:      General: There is no distension.      Palpations: Abdomen is soft.      Tenderness: There is no abdominal tenderness.   Musculoskeletal:         General: No swelling.   Skin:     Coloration: Skin is not jaundiced.   Neurological:      General: No focal deficit present.      Mental Status: He is alert and oriented to person, place, and time.   Psychiatric:         Mood and Affect: Mood normal.         Behavior: Behavior normal.             Review of Systems   Constitutional:  Negative for chills and fatigue.   HENT:  Negative for congestion.    Respiratory:  Negative for cough and shortness of breath.    Cardiovascular:  Negative for leg swelling.   Gastrointestinal:  Negative for diarrhea.   Genitourinary:  Negative for decreased urine volume.   Musculoskeletal:  Positive for neck pain. Negative for back pain.   Neurological:  Negative for headaches.   Psychiatric/Behavioral:  Negative for agitation.    All other systems reviewed and are  negative.      Scheduled Meds:  Current Facility-Administered Medications   Medication Dose Route Frequency Provider Last Rate    acetaminophen  975 mg Oral Q6H [facility] Ophelia Leon PA-C      amiodarone  200 mg Oral BID With Meals Ophelia Leon PA-C      apixaban  5 mg Oral BID Ophelia Leon PA-C      calcium carbonate  1,000 mg Oral Daily PRN Ophelia Leon PA-C      cefazolin  2,000 mg Intravenous Q8H Tom Andre MD 2,000 mg (09/01/24 0631)    chlorhexidine  15 mL Mouth/Throat Q12H [facility] Ophelia Leon PA-C      insulin lispro  1-5 Units Subcutaneous TID AC Ophelia Leon PA-C      insulin lispro  1-5 Units Subcutaneous HS Ophelia Leon PA-C      methocarbamol  750 mg Oral Q6H [facility] Ophelia Leon PA-C      metoprolol tartrate  25 mg Oral Q12H [facility] Ophelia Leon PA-C      ondansetron  4 mg Intravenous Q6H PRN Ophelia Leon PA-C      oxyCODONE  10 mg Oral Q4H PRN Ophelia Leon PA-C      oxyCODONE  5 mg Oral Q4H PRN Ophelia Leon PA-C      polyethylene glycol  17 g Oral Daily Ophelia Leon PA-C      potassium chloride  20 mEq Oral Daily Yokasta Flores MD      senna-docusate sodium  2 tablet Oral BID Ophelia Leon PA-C         PRN Meds:.  calcium carbonate    ondansetron    oxyCODONE    oxyCODONE    Continuous Infusions:         Invasive Devices:     Invasive Devices       Central Venous Catheter Line  Duration             [facility] Central Lines 08/29/24 Right Other (Comment) 2 days                      LABORATORY:    Results from last 7 days   Lab Units 09/01/24  0636 08/31/24  0407 08/30/24  0539 08/29/24  0414 08/28/24  0500 08/28/24  0000 08/27/24  1340 08/27/24  0500 08/27/24  0444 08/26/24  0508 08/26/24  0455   WBC Thousand/uL  --   --   --  15.12* 12.78*  --   --  11.51*  --   --  14.22*   HEMOGLOBIN g/dL  --   --   --  11.5* 11.4*  --   --  10.9*  --   --  11.2*   HEMATOCRIT %  --   --   --  38.1 37.6  --   --  34.6*  --   --  36.9   PLATELETS Thousands/uL  --   --   --  415* 450*  --   --  432*  --   --  434*    POTASSIUM mmol/L 4.5 4.4 4.0 3.6  --  3.5 4.0  --  3.2* 4.0  --    CHLORIDE mmol/L 99 94* 94* 91*  --  93* 91*  --  89* 91*  --    CO2 mmol/L 31 33* 32 33*  --  32 32  --  33* 30  --    BUN mg/dL 48* 65* 79* 91*  --  87* 90*  --  90* 70*  --    CREATININE mg/dL 1.09 1.21 1.40* 1.66*  --  1.83* 2.24*  --  2.59* 2.73*  --    CALCIUM mg/dL 9.5 9.7 9.8 9.8  --  9.5 9.4  --  9.3 9.7  --    MAGNESIUM mg/dL  --   --   --  2.0  --  2.2  --   --  2.1 2.3  --    PHOSPHORUS mg/dL  --  2.8 2.9 2.6  --  2.8  --   --  4.0  --   --       rest all reviewed    RADIOLOGY:  MRI cervical spine w wo contrast   Final Result by Becky Norwood MD (08/30 0733)      Compared to 10 days ago, persistent findings of C5-C6 osteodiscitis.      Small anterior paravertebral abscess. No epidural abscess or phlegmon.      Other stable, nonemergent findings above.                        Workstation performed: [id number]         IR tunneled central line placement   Final Result by Noah Heath MD (08/29 1140)   Impression: Successful image guided placement of tunneled central venous catheter.         Workstation performed: [id number]         MRI lumbar spine wo contrast   Final Result by Duc Justice MD (08/27 0748)      No discitis osteomyelitis involving the thoracic spine. Minimal thoracic spondylosis, as described above.      No discitis osteomyelitis involving the lumbar spine.      Multilevel lumbar spondylosis, as described above, contributing to at most moderate canal stenosis, right greater than left lateral recess stenosis, moderate to severe right and mild left neural foraminal stenosis at L4-L5.      Discitis osteomyelitis again demonstrated at C5-C6, on the localizer/sagittal vertebral counting images.      Right pleural effusion.      Workstation performed: [id number]         MRI thoracic spine wo contrast   Final Result by Duc Justice MD (08/27 0748)      No discitis osteomyelitis involving the thoracic spine. Minimal thoracic  "spondylosis, as described above.      No discitis osteomyelitis involving the lumbar spine.      Multilevel lumbar spondylosis, as described above, contributing to at most moderate canal stenosis, right greater than left lateral recess stenosis, moderate to severe right and mild left neural foraminal stenosis at L4-L5.      Discitis osteomyelitis again demonstrated at C5-C6, on the localizer/sagittal vertebral counting images.      Right pleural effusion.      Workstation performed: [id number]         CT chest abdomen pelvis wo contrast   Final Result by Oneil Desai MD (08/26 1615)   1.  New moderate nonspecific pericardial effusion and small bilateral pleural effusions. No other definitive manifestations of acute infection-allowing for the limitations of noncontrast CT   2.  Complex exophytic right renal cystic lesion, incompletely assessed by noncontrast CT. Nonemergent follow-up gadolinium enhanced MRI of the abdomen is recommended for further evaluation.      3.  Please refer to the report body for description of other incidental chronic and/or benign findings.               Workstation performed: [id number]         XR spine cervical 2 or 3 vw injury   Final Result by Bill Ferreira MD (08/26 0722)      Destruction of the C5 to space with collapse of the superior endplate of C6 consistent with discitis osteomyelitis which has progressed from the prior studies.      Increased soft tissue swelling anterior to C5-6 level.      Left central venous catheter terminating at the confluence of the innominate veins.         Workstation performed: [id number]         XR spine cervical 2 or 3 vw injury    (Results Pending)     Rest all reviewed    Portions of the record may have been created with voice recognition software. Occasional wrong word or \"sound a like\" substitutions may have occurred due to the inherent limitations of voice recognition software. Read the chart carefully and recognize, using context, " where substitutions have occurred.If you have any questions, please contact the dictating provider.

## 2024-08-31 NOTE — PLAN OF CARE
Problem: Prexisting or High Potential for Compromised Skin Integrity  Goal: Skin integrity is maintained or improved  Description: INTERVENTIONS:  - Identify patients at risk for skin breakdown  - Assess and monitor skin integrity  - Assess and monitor nutrition and hydration status  - Monitor labs   - Assess for incontinence   - Turn and reposition patient  - Assist with mobility/ambulation  - Relieve pressure over bony prominences  - Avoid friction and shearing  - Provide appropriate hygiene as needed including keeping skin clean and dry  - Evaluate need for skin moisturizer/barrier cream  - Collaborate with interdisciplinary team   - Patient/family teaching  - Consider wound care consult   Outcome: Progressing     Problem: NEUROSENSORY - ADULT  Goal: Achieves stable or improved neurological status  Description: INTERVENTIONS  - Monitor and report changes in neurological status  - Monitor vital signs such as temperature, blood pressure, glucose, and any other labs ordered   - Initiate measures to prevent increased intracranial pressure  - Monitor for seizure activity and implement precautions if appropriate      Outcome: Progressing     Problem: CARDIOVASCULAR - ADULT  Goal: Maintains optimal cardiac output and hemodynamic stability  Description: INTERVENTIONS:  - Monitor I/O, vital signs and rhythm  - Monitor for S/S and trends of decreased cardiac output  - Administer and titrate ordered vasoactive medications to optimize hemodynamic stability  - Assess quality of pulses, skin color and temperature  - Assess for signs of decreased coronary artery perfusion  - Instruct patient to report change in severity of symptoms  Outcome: Progressing  Goal: Absence of cardiac dysrhythmias or at baseline rhythm  Description: INTERVENTIONS:  - Continuous cardiac monitoring, vital signs, obtain 12 lead EKG if ordered  - Administer antiarrhythmic and heart rate control medications as ordered  - Monitor electrolytes and  administer replacement therapy as ordered  Outcome: Progressing     Problem: RESPIRATORY - ADULT  Goal: Achieves optimal ventilation and oxygenation  Description: INTERVENTIONS:  - Assess for changes in respiratory status  - Assess for changes in mentation and behavior  - Position to facilitate oxygenation and minimize respiratory effort  - Oxygen administered by appropriate delivery if ordered  - Initiate smoking cessation education as indicated  - Encourage broncho-pulmonary hygiene including cough, deep breathe, Incentive Spirometry  - Assess the need for suctioning and aspirate as needed  - Assess and instruct to report SOB or any respiratory difficulty  - Respiratory Therapy support as indicated  Outcome: Progressing     Problem: GENITOURINARY - ADULT  Goal: Maintains or returns to baseline urinary function  Description: INTERVENTIONS:  - Assess urinary function  - Encourage oral fluids to ensure adequate hydration if ordered  - Administer IV fluids as ordered to ensure adequate hydration  - Administer ordered medications as needed  - Offer frequent toileting  - Follow urinary retention protocol if ordered  Outcome: Progressing  Goal: Absence of urinary retention  Description: INTERVENTIONS:  - Assess patient’s ability to void and empty bladder  - Monitor I/O  - Bladder scan as needed  - Discuss with physician/AP medications to alleviate retention as needed  - Discuss catheterization for long term situations as appropriate  Outcome: Progressing  Goal: Urinary catheter remains patent  Description: INTERVENTIONS:  - Assess patency of urinary catheter  - If patient has a chronic avilez, consider changing catheter if non-functioning  - Follow guidelines for intermittent irrigation of non-functioning urinary catheter  Outcome: Progressing     Problem: METABOLIC, FLUID AND ELECTROLYTES - ADULT  Goal: Electrolytes maintained within normal limits  Description: INTERVENTIONS:  - Monitor labs and assess patient for signs  and symptoms of electrolyte imbalances  - Administer electrolyte replacement as ordered  - Monitor response to electrolyte replacements, including repeat lab results as appropriate  - Instruct patient on fluid and nutrition as appropriate  Outcome: Progressing  Goal: Fluid balance maintained  Description: INTERVENTIONS:  - Monitor labs   - Monitor I/O and WT  - Instruct patient on fluid and nutrition as appropriate  - Assess for signs & symptoms of volume excess or deficit  Outcome: Progressing  Goal: Glucose maintained within target range  Description: INTERVENTIONS:  - Monitor Blood Glucose as ordered  - Assess for signs and symptoms of hyperglycemia and hypoglycemia  - Administer ordered medications to maintain glucose within target range  - Assess nutritional intake and initiate nutrition service referral as needed  Outcome: Progressing     Problem: SKIN/TISSUE INTEGRITY - ADULT  Goal: Skin Integrity remains intact(Skin Breakdown Prevention)  Description: Assess:  -Perform Iraj assessment   -Clean and moisturize skin  -Inspect skin when repositioning, toileting, and assisting with ADLS  -Assess under medical devices   -Assess extremities for adequate circulation and sensation     Bed Management:  -Have minimal linens on bed & keep smooth, unwrinkled  -Change linens as needed when moist or perspiring    Toileting:  -Offer bedside commode  -Assess for incontinence  -Use incontinent care products after each incontinent episode     Skin Care:  -Avoid use of baby powder, tape, friction and shearing, hot water or constrictive clothing  -Relieve pressure over bony prominences   -Do not massage red bony areas    Next Steps:  -Teach patient strategies to minimize risks   -Consider consults to  interdisciplinary teams  Outcome: Progressing  Goal: Incision(s), wounds(s) or drain site(s) healing without S/S of infection  Description: INTERVENTIONS  - Assess and document dressing, incision, wound bed, drain sites and  surrounding tissue  - Provide patient and family education  - Perform skin care/dressing changes  Outcome: Progressing     Problem: HEMATOLOGIC - ADULT  Goal: Maintains hematologic stability  Description: INTERVENTIONS  - Assess for signs and symptoms of bleeding or hemorrhage  - Monitor labs  - Administer supportive blood products/factors as ordered and appropriate  Outcome: Progressing     Problem: MUSCULOSKELETAL - ADULT  Goal: Maintain or return mobility to safest level of function  Description: INTERVENTIONS:  - Assess patient's ability to carry out ADLs; assess patient's baseline for ADL function and identify physical deficits which impact ability to perform ADLs (bathing, care of mouth/teeth, toileting, grooming, dressing, etc.)  - Assess/evaluate cause of self-care deficits   - Assess range of motion  - Assess patient's mobility  - Assess patient's need for assistive devices and provide as appropriate  - Encourage maximum independence but intervene and supervise when necessary  - Involve family in performance of ADLs  - Assess for home care needs following discharge   - Consider OT consult to assist with ADL evaluation and planning for discharge  - Provide patient education as appropriate  Outcome: Progressing  Goal: Maintain proper alignment of affected body part  Description: INTERVENTIONS:  - Support, maintain and protect limb and body alignment  - Provide patient/ family with appropriate education  Outcome: Progressing     Problem: Nutrition/Hydration-ADULT  Goal: Nutrient/Hydration intake appropriate for improving, restoring or maintaining nutritional needs  Description: Monitor and assess patient's nutrition/hydration status for malnutrition. Collaborate with interdisciplinary team and initiate plan and interventions as ordered.  Monitor patient's weight and dietary intake as ordered or per policy. Utilize nutrition screening tool and intervene as necessary. Determine patient's food preferences and  provide high-protein, high-caloric foods as appropriate.     INTERVENTIONS:  - Monitor oral intake, urinary output, labs, and treatment plans  - Assess nutrition and hydration status and recommend course of action  - Evaluate amount of meals eaten  - Assist patient with eating if necessary   - Allow adequate time for meals  - Recommend/ encourage appropriate diets, oral nutritional supplements, and vitamin/mineral supplements  - Order, calculate, and assess calorie counts as needed  - Recommend, monitor, and adjust tube feedings and TPN/PPN based on assessed needs  - Assess need for intravenous fluids  - Provide specific nutrition/hydration education as appropriate  - Include patient/family/caregiver in decisions related to nutrition  Outcome: Progressing

## 2024-08-31 NOTE — ASSESSMENT & PLAN NOTE
Lab Results   Component Value Date    HGBA1C 6.8 (H) 07/05/2024       Recent Labs     08/30/24  1539 08/30/24  2100 08/31/24  0556 08/31/24  1047   POCGLU 186* 138 166* 176*         Blood Sugar Average: Last 72 hrs:  (P) [id number]  Home regimen: metformin and Jardiance  Continue diabetic diet and sliding scale coverage  Goal blood glucose 140-180

## 2024-08-31 NOTE — ASSESSMENT & PLAN NOTE
CC:   Chief Complaint   Patient presents with     SUBJECTIVE:  Omar Love a 45 year old  male who presents to [facility] with respiratory symptoms which have been present for  two days.   Symptoms include:  HEADACHE, COUGH FATIIGUE ,  Audible wheezing, SOB sore throat and productive cough for three days.  Denies:   AGGRAVATING FACTORS None    There is no problem list on file for this patient.    SH: non-smoking household\"}  Allergies: ALLERGIES: no known allergies.    Review of Systems  Constitutional: No changes in appetite or activity level.  Respiratory: : plus congestion, wheezing, sore throat and  cough reported.    Cardiovascular: No heart murmurs or congenital heart disease reported.  Gastrointestinal: No vomiting, diarrhea, or constipation reported.  Infectious: No fever or recent infections reported.  Neurological: No seizures, hypotonia, hypertonia, or neurological disease reported.  Endocrine: No growth concerns reported.  Musculoskeletal: No gait, muscle, or skeletal concerns reported.  Skin: No history of rashes or eczema.  Psychiatric/Mental Health: Non-contributory     PMHX=SAME AS ACTIVE  PSX=NONE DISCLOSED THIS VISIT  FAMHX=NON CONTRIBUTORY  SOCHX=NO TOB      OBJECTIVE:    General appearance: Alert, well hydrated, in no apparent distress    Ear Exam: TM's intact without erythema, bulging, retraction, or fluid level -   external auditory canal clear    Conjunctiva: Clear without injection or discharge; lids clear    Nose: nasal mucosa moist, pink, without rhinorrhea or sinus tenderness    Throat: pink and moist without erythema, edema, or exudates    Neck: supple without cervical lymphadenopathy.  No meningismus.    Heart: regular rate and rhythm, no murmurs, clicks or gallops    Lungs: PLUS wheezes, rhonchi AND  Rales.     Gastro:Soft, bowel sounds, normal no masses or tenderness.  EFFORT:mildly distressed   Skin: smooth without rash or edema and capillary refill is  Patient with prior admission with MSSA bacteremia on 7/28 treated with 7 days of antibiotics  Etiology likely 2/2 right foot ulcer  Complicated by cervical discitis  TTE without evidence of endocarditis  ID consulted, plan for ancef x 6 weeks  8/23 Blood cultures: -No growth after 5 days  MRSA swab negative   MRI - Discitis osteomyelitis again demonstrated at C5-C6, repeat MRI C spine is negative for meningeal enhancement  Pericardial drain deferred by interventional cardiology  LUCRECIA deferred by cardiology, - will obtain in the future when c collar is discontinued  Check limited TTE in 1 month  CBC CMP weekly while on antibiotics.  Discontinue PICC line once the IV antibiotics is completed  Outpatient follow-up with infectious disease   normal      ASSESSMENT/PLAN:    Exposure to COVID-19 virus=detected  - POCT SARS-COV-2 ANTIGEN  - predniSONE (DELTASONE) 20 MG tablet; Take 2 tablets by mouth daily for 5 days.  Dispense: 10 tablet; Refill: 0  - levoFLOXacin (Levaquin) 500 MG tablet; Take 1 tablet by mouth daily for 5 days.  Dispense: 5 tablet; Refill: 0  - albuterol 108 (90 Base) MCG/ACT inhaler; Inhale 2 puffs into the lungs every 4 hours as needed for Shortness of Breath or Wheezing.  Dispense: 1 Inhaler; Refill: 0    Congestion of upper airway  - XR CHEST PA AND LATERAL 2 VIEWS; Future    FOLLOW UP WITH PRIMARY DOC IN TWO DAYS  GO TO ED FOR CHEST PAIN SHORTNESS OF BREATH OR DIZZINESS.      Diagnosis and prognosis, treatment and side-effects were explained and all questions were answered satisfactorily and there were no further questions upon discharge

## 2024-08-31 NOTE — ASSESSMENT & PLAN NOTE
Developed new onset afib 8/21/8/22 and was started on amiodarone infusion-  Plan was for amiodarone 200 mg p.o. twice daily for 5 days and then 200 mg daily.  Currently changed to 200 mg daily  Metoprolol 25 mg BID -can uptitrate as BP tolerates   Eliquis for anticoagulation

## 2024-08-31 NOTE — ASSESSMENT & PLAN NOTE
BP is acceptable, continue metoprolol  Patient was on Norvasc and losartan as outpatient which is on hold at the time of discharge.  Reevaluate the need to add antihypertensives as outpatient

## 2024-08-31 NOTE — CASE MANAGEMENT
Case Management Discharge Planning Note    Patient name Augustus Coffman  Location [facility] 505/[facility] 505-01 MRN [id number]  : 1955 Date 2024       Current Admission Date: 2024  Current Admission Diagnosis:MSSA bacteremia   Patient Active Problem List    Diagnosis Date Noted Date Diagnosed    Constipation 2024     Acute pericarditis 2024     Diabetic ulcer of right foot ([facility]) 2024     MSSA bacteremia 2024     New onset a-fib ([facility]) 2024     Acute osteomyelitis of cervical spine ([facility]) 2024     Transaminitis 2024     Acute renal failure with oliguria  ([facility]) 2024     Neck pain 2024     Sepsis without acute organ dysfunction ([facility]) 2024     Acute respiratory failure with hypoxia ([facility]) 2024     Acute hyponatremia 2024     Chronic diastolic CHF (congestive heart failure) ([facility]) 2024     Encounter for deep vein thrombosis (DVT) prophylaxis 2024     Hyponatremia 2024     Aortic stenosis with trileaflet valve 07/15/2024     Ectatic thoracic aorta ([facility]) 2024     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 ([facility]) 10/09/2023     Vitamin D deficiency 2023     Stage 3a chronic kidney disease ([facility]) 2023     Follicular lymphoma grade I of lymph nodes of multiple sites ([facility]) 08/15/2022     Obesity, morbid ([facility]) 2022     Other proteinuria 2022     Stage 2 chronic kidney disease 09/15/2021     Hypertensive kidney disease with stage 2 chronic kidney disease 09/15/2021     Rash 2021     Chronic venous hypertension (idiopathic) with ulcer of right lower extremity ([facility]) 2021     GI bleed 2021     Pleural effusion 2021     Heart murmur 2021     Retroperitoneal hematoma 2021     Mesenteric lymphadenopathy 2021     Acute blood loss anemia 2021     FINA (acute kidney injury) on CKD stage 2([facility]) 2021     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     Stasis dermatitis  of both legs 04/17/2019     Hammer toe of right foot 02/19/2019     Obesity with body mass index 30 or greater 10/31/2016     Diabetes 1.5, 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     History of hypertension 10/06/2016     Hypercholesteremia 10/06/2016     Diabetic peripheral neuropathy ([facility]) 11/10/2014     Gout 12/06/2007     Type 2 diabetes mellitus ([facility]CC) 12/06/2007       LOS (days): 8  Geometric Mean LOS (GMLOS) (days): 5.1  Days to GMLOS:-2.7     OBJECTIVE:  Risk of Unplanned Readmission Score: 23.35         Current admission status: Inpatient   Preferred Pharmacy:   [id number] [address]  [address] [address]  Phone: [phone] Fax: [phone]    [id number] - [address] [address]  Phone: [phone] Fax: [phone]    Primary Care Provider: Gwen Lazo DO    Primary Insurance: MEDICARE  Secondary Insurance: [facility]    DISCHARGE DETAILS:         Additional Comments: NATACHA COLLINS was made aware by provider that pt is cleared for d.c today. NATACHA COLLINS reached out to [facility] liaison and was later made aware that they would not be able to admit pt as they wanted to see how pt did once his amiodarone infusion was stopped and they also do not have any male beds at this time. NATACHA COLLINS notified provider.

## 2024-08-31 NOTE — ASSESSMENT & PLAN NOTE
8/22 Echo: Limited echo for assessment of endocarditis.  The patient's aortic valve is difficult to assess due to calcification but there is no new significant regurgitation.  The mitral valve has hyperechoic thickening at the tips consistent with most likely calcification, these were seen on the echocardiograms in July.  The tricuspid valve similarly was not well-visualized but no new regurgitation.  The pulmonary valve was not well-visualized. Off note patient has new moderate pericardial effusion without specific echocardiographic signs of tamponade  Patient with symptoms consistent with pericarditis  8/27 TTE - EF 50%, mod focal calcification mitral valve, moderate circumferential pericardial effusion, fluid with fibrinous appearance, no sign of tamponade   Cardiology consulted, no plans for drainage   Completed Colchicine and Prednisone on 8/27  Close hemodynamic monitoring  Outpatient follow up with cardiology for monitoring of effusion  Patient needed a repeat TTE in a month

## 2024-08-31 NOTE — PROGRESS NOTES
[facility]    Cardiology Progress Note  Augustus Coffman 68 y.o. male   YOB: 1955 MRN: [id number]  Unit/Bed#: [facility] 505-01 Encounter: [id number]      Subjective:   No significant events overnight.  Continues to have significant neck discomfort but otherwise is doing well.    Assessments  68-year-old gentleman with sepsis (MSSA bacteremia), cervical discitis, in cervical collar, acute kidney injury (peaked Cr 3.38 - now improving), had atrial fibrillation and pericardial effusion, for which she he has been on amiodarone infusion + PO amiodarone.   Principal Problem:    MSSA bacteremia  Active Problems:    History of hypertension    Type 2 diabetes mellitus (HCC)    FINA (acute kidney injury) on CKD stage 2(HCC)    Acute osteomyelitis of cervical spine (HCC)    Transaminitis    New onset a-fib (HCC)    Acute pericarditis    Diabetic ulcer of right foot (HCC)    Constipation        Plan  Paroxysmal Atrial fibrillation / flutter  initially had Afib on 8/24/24, and then converted to flutter - ECGs personally reviewed  Now back in sinus rhythm  Currently on IV amiodarone infusion at 0.5+ p.o. amiodarone 200 mg twice daily   He has already received about 7 g (4.5 g IV + 2.4 g p.o.) of amiodarone (has been receiving it since 8/25/24)   Remains in sinus rhythm on telemetry   Continue amiodarone to complete 10 g load, and then plan to continue p.o. amiodarone 200 mg daily thereafter    Pericardial effusion  Noted on echocardiogram on 8/22/2024, was moderate in size at that time --> has been stable/slightly decreasing on follow up echos on 8/24 & 8/27  With decreasing size and fibrinous appearing fluid, pericardiocentesis has been held off  Monitor clinically  Follow-up echocardiogram in 4 weeks or earlier - if any new symptoms    MSSA bacteremia  Blood culture 8/20 positive for MSSA  Urine culture 8/21 - Enterococcus faecalis (10-19k cfu/ml)   Follow up blood cultures thereafter have been  "negative  Has been recommended LUCRECIA, but currently limited by presence of Cervical collar and limitations with mobility related to same  Plan for LUCRECIA eventually post removal of cervical collar, can be done outpatient  Can follow-up with transthoracic echocardiogram in the interim, if any new concerns    Review of Systems   All other systems reviewed and are negative.      Telemetry Review: No significant arrhythmias seen on telemetry review. NSR    Objective:   Vitals: Blood pressure 111/65, pulse 92, temperature 98 °F (36.7 °C), temperature source Oral, resp. rate 18, height 6' 3\" (1.905 m), weight 112 kg (246 lb 3.2 oz), SpO2 93%., Body mass index is 30.77 kg/m².,   Orthostatic Blood Pressures      Flowsheet Row Most Recent Value   Blood Pressure 111/65 filed at 2024 0759   Patient Position - Orthostatic VS Sitting filed at 2024 0759           Systolic (24hrs), Av , Min:111 , Max:134     Diastolic (24hrs), Av, Min:65, Max:84    Wt Readings from Last 5 Encounters:   24 112 kg (246 lb 3.2 oz)   24 112 kg (248 lb)   24 115 kg (253 lb 8.5 oz)   24 118 kg (260 lb)   24 119 kg (262 lb)     I/O          0701   0700  0701   0700  0701   0700    P.O. 960 360     I.V. (mL/kg) 547.3 (4.9) 514.3 (4.6)     IV Piggyback  100     Total Intake(mL/kg) 1507.3 (13.5) 974.3 (8.7)     Urine (mL/kg/hr) 2250 (0.8) 3350 (1.2)     Stool 0      Total Output 2250 3350     Net -742.7 -2375.7            Unmeasured Urine Occurrence 1 x      Unmeasured Stool Occurrence 1 x                  Physical Exam  Vitals and nursing note reviewed.   Constitutional:       General: He is awake. He is not in acute distress.     Appearance: He is well-developed. He is obese. He is not ill-appearing or diaphoretic.      Comments: Noted to be in a cervical collar   HENT:      Head: Normocephalic and atraumatic.      Nose: No congestion.   Eyes:      General: No scleral icterus.     " Conjunctiva/sclera: Conjunctivae normal.   Neck:      Vascular: No carotid bruit or JVD.   Cardiovascular:      Rate and Rhythm: Normal rate and regular rhythm.      Heart sounds: Normal heart sounds. No murmur heard.     No friction rub. No gallop.   Pulmonary:      Effort: Pulmonary effort is normal. No respiratory distress.      Breath sounds: Normal breath sounds. No wheezing or rales.   Chest:      Chest wall: No tenderness.   Abdominal:      General: There is no distension.      Palpations: Abdomen is soft.      Tenderness: There is no abdominal tenderness.   Musculoskeletal:         General: No swelling, tenderness or deformity.      Cervical back: Neck supple. No muscular tenderness.      Right lower leg: No edema.      Left lower leg: No edema.   Skin:     General: Skin is warm.   Neurological:      General: No focal deficit present.      Mental Status: He is alert and oriented to person, place, and time. Mental status is at baseline.   Psychiatric:         Mood and Affect: Mood normal.         Behavior: Behavior normal. Behavior is cooperative.         Thought Content: Thought content normal.           Laboratory Results: personally reviewed        CBC with diff:   Results from last 7 days   Lab Units 08/29/24  0414 08/28/24  0500 08/27/24  0500 08/26/24  0455 08/25/24  0405   WBC Thousand/uL 15.12* 12.78* 11.51* 14.22* 17.18*   HEMOGLOBIN g/dL 11.5* 11.4* 10.9* 11.2* 10.2*   HEMATOCRIT % 38.1 37.6 34.6* 36.9 33.4*   MCV fL 83 81* 81* 80* 81*   PLATELETS Thousands/uL 415* 450* 432* 434* 366   RBC Million/uL 4.59 4.64 4.28 4.61 4.11   MCH pg 25.1* 24.6* 25.5* 24.3* 24.8*   MCHC g/dL 30.2* 30.3* 31.5 30.4* 30.5*   RDW % 18.0* 17.5* 17.2* 17.2* 17.2*   MPV fL 9.9 10.2 9.9 9.9 10.0   NRBC AUTO /100 WBCs  --   --   --  0  --    NRBC /100 WBC  --   --  2  --   --          CMP:  Results from last 7 days   Lab Units 08/31/24  0407 08/30/24  0539 08/29/24  0414 08/28/24  0000 08/27/24  1340 08/27/24  0444  "08/26/24  0508 08/25/24  0406 08/25/24  0405   POTASSIUM mmol/L 4.4 4.0 3.6 3.5 4.0 3.2* 4.0   < >  --    CHLORIDE mmol/L 94* 94* 91* 93* 91* 89* 91*   < >  --    CO2 mmol/L 33* 32 33* 32 32 33* 30   < >  --    BUN mg/dL 65* 79* 91* 87* 90* 90* 70*   < >  --    CREATININE mg/dL 1.21 1.40* 1.66* 1.83* 2.24* 2.59* 2.73*   < >  --    CALCIUM mg/dL 9.7 9.8 9.8 9.5 9.4 9.3 9.7   < >  --    AST U/L  --   --   --  99*  --  257* 513*  --  917*   ALT U/L  --   --   --  7  --  15 33  --  81*   ALK PHOS U/L  --   --   --  218*  --  268* 302*  --  188*   EGFR ml/min/1.73sq m 61 51 42 37 29 24 23   < >  --     < > = values in this interval not displayed.         BMP:  Results from last 7 days   Lab Units 08/31/24  0407 08/30/24  0539 08/29/24  0414 08/28/24  0000 08/27/24  1340 08/27/24  0444 08/26/24  0508   POTASSIUM mmol/L 4.4 4.0 3.6 3.5 4.0 3.2* 4.0   CHLORIDE mmol/L 94* 94* 91* 93* 91* 89* 91*   CO2 mmol/L 33* 32 33* 32 32 33* 30   BUN mg/dL 65* 79* 91* 87* 90* 90* 70*   CREATININE mg/dL 1.21 1.40* 1.66* 1.83* 2.24* 2.59* 2.73*   CALCIUM mg/dL 9.7 9.8 9.8 9.5 9.4 9.3 9.7       BNP: No results for input(s): \"BNP\" in the last 72 hours.    Magnesium:   Results from last 7 days   Lab Units 08/29/24  0414 08/28/24  0000 08/27/24  0444 08/26/24  0508 08/25/24  0405 08/24/24  1551 08/24/24  1003   MAGNESIUM mg/dL 2.0 2.2 2.1 2.3 2.2 2.5 2.3       Coags:   Results from last 7 days   Lab Units 08/28/24  0605 08/27/24  2313 08/27/24  1626 08/27/24  0952 08/26/24  2019 08/26/24  1342 08/26/24  0453 08/25/24  0405   PTT seconds 56* 55* 34 35* 68* 69* 59* 61*   INR   --   --   --   --   --   --   --  1.56*       TSH:        Hemoglobin A1C       Lipid Profile:       Meds/Allergies   all current active meds have been reviewed and current meds:   Current Facility-Administered Medications   Medication Dose Route Frequency    acetaminophen (TYLENOL) tablet 975 mg  975 mg Oral Q6H SRIDHAR    amiodarone (CORDARONE) 900 mg in dextrose 5 % 500 mL " infusion  0.5 mg/min Intravenous Continuous    amiodarone tablet 200 mg  200 mg Oral BID With Meals    apixaban (ELIQUIS) tablet 5 mg  5 mg Oral BID    calcium carbonate (TUMS) chewable tablet 1,000 mg  1,000 mg Oral Daily PRN    ceFAZolin (ANCEF) IVPB (premix in dextrose) 2,000 mg 50 mL  2,000 mg Intravenous Q8H    chlorhexidine (PERIDEX) 0.12 % oral rinse 15 mL  15 mL Mouth/Throat Q12H SRIDHAR    insulin lispro (HumALOG/ADMELOG) 100 units/mL subcutaneous injection 1-5 Units  1-5 Units Subcutaneous TID AC    insulin lispro (HumALOG/ADMELOG) 100 units/mL subcutaneous injection 1-5 Units  1-5 Units Subcutaneous HS    methocarbamol (ROBAXIN) tablet 750 mg  750 mg Oral Q6H SRIDHAR    metoprolol tartrate (LOPRESSOR) tablet 25 mg  25 mg Oral Q12H SRIDHAR    ondansetron (ZOFRAN) injection 4 mg  4 mg Intravenous Q6H PRN    oxyCODONE (ROXICODONE) immediate release tablet 10 mg  10 mg Oral Q4H PRN    oxyCODONE (ROXICODONE) IR tablet 5 mg  5 mg Oral Q4H PRN    polyethylene glycol (MIRALAX) packet 17 g  17 g Oral Daily    potassium chloride (Klor-Con M20) CR tablet 20 mEq  20 mEq Oral Daily    senna-docusate sodium (SENOKOT S) 8.6-50 mg per tablet 2 tablet  2 tablet Oral BID       Medications Prior to Admission:     allopurinol (ZYLOPRIM) 300 mg tablet    amLODIPine (NORVASC) 10 mg tablet    aspirin 81 mg chewable tablet    Cholecalciferol 100 MCG (4000 UT) CAPS    cyclobenzaprine (FLEXERIL) 10 mg tablet    Diclofenac Sodium (VOLTAREN) 1 %    Diclofenac Sodium (VOLTAREN) 1 %    Diclofenac Sodium (VOLTAREN) 1 %    Empagliflozin (Jardiance) 25 MG TABS    hydroCHLOROthiazide 25 mg tablet    ketorolac (TORADOL) 10 mg tablet    lidocaine (Lidoderm) 5 %    lidocaine (LMX) 4 % cream    losartan (COZAAR) 100 MG tablet    metFORMIN (GLUCOPHAGE) 500 mg tablet    metoprolol succinate (TOPROL-XL) 100 mg 24 hr tablet    Multiple Vitamins-Minerals (Centrum Silver 50+Men) TABS    oxyCODONE (Roxicodone) 5 immediate release tablet    rosuvastatin  (CRESTOR) 40 MG tablet    tiZANidine (ZANAFLEX) 4 mg tablet  amiodarone (CORDARONE) 900 mg in dextrose 5 % 500 mL infusion, 0.5 mg/min, Last Rate: 0.5 mg/min (24 0121)          Cardiac testing: reviewed  Results for orders placed during the hospital encounter of 21    Echo complete with contrast if indicated    [facility]  [address] [place], [place] [address] [phone]    Transthoracic Echocardiogram  2D, M-mode, Doppler, and Color Doppler    Study date:  2021    Patient: CARMELO RHODES  MR number: [id number]  Account number: [id number]  : 1955  Age: 65 years  Gender: Male  Status: Outpatient  Location: [facility]  Height: 76 in  Weight: 298.3 lb  BP: 126/ 82 mmHg    Indications: AAA with out rupture..    Diagnoses: I71.2 - Thoracic aortic aneurysm, without rupture    Sonographer:  MARSHALL Pérez  Primary Physician:  oBlivar Castaneda MD  Referring Physician:  Bolivar Castaneda MD  Group:  [facility]  Interpreting Physician:  Eriberto Lopez MD    SUMMARY    LEFT VENTRICLE:  Systolic function was normal. Ejection fraction was estimated to be 60 %.  There were no regional wall motion abnormalities.  Concentric hypertrophy was present.  Doppler parameters were consistent with abnormal left ventricular relaxation (grade 1 diastolic dysfunction).    RIGHT VENTRICLE:  The ventricle was mildly dilated.  Systolic function was normal.    LEFT ATRIUM:  The atrium was mildly dilated.    RIGHT ATRIUM:  The atrium was mildly dilated.    MITRAL VALVE:  There was moderate annular calcification.    TRICUSPID VALVE:  There was trace regurgitation.  Pulmonary artery systolic pressure was within the normal range.    PULMONIC VALVE:  There was trace regurgitation.    AORTA:  The root exhibited upper limit of normal size (3.8 cm).    HISTORY: PRIOR HISTORY: Risk factors: hypertension, oral hypoglycemic-treated diabetes, and  medication-treated hypercholesterolemia.    PROCEDURE: The study was performed in the [facility]. This was a routine study. The transthoracic approach was used. The study included complete 2D imaging, M-mode, complete spectral Doppler, and color Doppler. The  heart rate was 66 bpm, at the start of the study. This was a technically difficult study.    LEFT VENTRICLE: Size was normal. Systolic function was normal. Ejection fraction was estimated to be 60 %. There were no regional wall motion abnormalities. Concentric hypertrophy was present. No evidence of apical thrombus. DOPPLER:  Doppler parameters were consistent with abnormal left ventricular relaxation (grade 1 diastolic dysfunction).    RIGHT VENTRICLE: The ventricle was mildly dilated. Systolic function was normal. Wall thickness was normal.    LEFT ATRIUM: The atrium was mildly dilated.    RIGHT ATRIUM: The atrium was mildly dilated.    MITRAL VALVE: There was moderate annular calcification. There was calcification of the anterior leaflet. There was normal leaflet separation. DOPPLER: The transmitral velocity was within the normal range. There was no evidence for  stenosis. There was no significant regurgitation.    AORTIC VALVE: The valve was not visualized well enough to rule out a bicuspid morphology. Leaflets exhibited mildly to moderately increased thickness and normal cuspal separation. DOPPLER: Transaortic velocity was within the normal range.  There was no evidence for stenosis. There was no significant regurgitation.    TRICUSPID VALVE: The valve structure was normal. There was normal leaflet separation. DOPPLER: The transtricuspid velocity was within the normal range. There was no evidence for stenosis. There was trace regurgitation. Pulmonary artery  systolic pressure was within the normal range.    PULMONIC VALVE: Leaflets exhibited normal thickness, no calcification, and normal cuspal separation. DOPPLER: The transpulmonic  velocity was within the normal range. There was trace regurgitation.    PERICARDIUM: There was no pericardial effusion. The pericardium was normal in appearance.    AORTA: The root exhibited upper limit of normal size (3.8 cm).    SYSTEMIC VEINS: IVC: The inferior vena cava was normal in size.    SYSTEM MEASUREMENT TABLES    2D  %FS: 46.54 %  Ao Diam: 3.83 cm  EDV(Teich): 78.78 ml  EF(Teich): 78.29 %  ESV(Teich): 17.11 ml  IVSd: 1.43 cm  LA Area: 21.83 cm2  LA Diam: 4.18 cm  LVEDV MOD A4C: 107.4 ml  LVEF MOD A4C: 48.1 %  LVESV MOD A4C: 55.74 ml  LVIDd: 4.2 cm  LVIDs: 2.25 cm  LVLd A4C: 8.22 cm  LVLs A4C: 7.29 cm  LVOT Diam: 2.72 cm  LVPWd: 1.46 cm  RA Area: 23.57 cm2  RVIDd: 4.33 cm  RWT: 0.7  SV MOD A4C: 51.66 ml  SV(Teich): 61.67 ml    CW  AV Env.Ti: 283.16 ms  AV MaxP.49 mmHg  AV VTI: 50.96 cm  AV Vmax: 2.52 m/s  AV Vmean: 1.8 m/s  AV meanP.64 mmHg  TR MaxP.97 mmHg  TR Vmax: 2.29 m/s    MM  TAPSE: 2.45 cm    PW  IAIN (VTI): 2.39 cm2  IAIN Vmax: 2.26 cm2  AVAI (VTI): 0 cm2/m2  AVAI Vmax: 0 cm2/m2  E': 0.07 m/s  E/E': 11.39  LVOT Env.Ti: 304.47 ms  LVOT VTI: 21 cm  LVOT Vmax: 0.98 m/s  LVOT Vmean: 0.69 m/s  LVOT maxPG: 3.84 mmHg  LVOT meanP.24 mmHg  LVSI Dopp: 46.37 ml/m2  LVSV Dopp: 121.96 ml  MV A [place]: 1.02 m/s  MV Dec [place]: 2.51 m/s2  MV DecT: 309.55 ms  MV E [place]: 0.78 m/s  MV E/A Ratio: 0.76  MV PHT: 89.77 ms  MVA By PHT: 2.45 cm2    [employer] Accredited Echocardiography Laboratory    Prepared and electronically signed by    Eriberto Lopez MD  Signed 2021 15:24:46    No results found for this or any previous visit.    No results found for this or any previous visit.    No results found for this or any previous visit.

## 2024-08-31 NOTE — ASSESSMENT & PLAN NOTE
Baseline Cr 1-1.2  Developed FINA 8/23 with associated hyperkalemia  Nephrology was consulted.  Villa removed  Avoid nephrotoxins  Trend renal function - improved  Outpatient follow up with nephrology once the patient is discharged.

## 2024-08-31 NOTE — PROGRESS NOTES
[facility]  Progress Note  Name: Augustus Coffman I  MRN: [id number]  Unit/Bed#: PPHP 505-01 I Date of Admission: 8/23/2024   Date of Service: 8/31/2024 I Hospital Day: 8    Assessment & Plan   * MSSA bacteremia  Assessment & Plan  Patient with prior admission with MSSA bacteremia on 7/28 treated with 7 days of antibiotics  Etiology likely 2/2 right foot ulcer  Complicated by cervical discitis  TTE without evidence of endocarditis     Plan:  ID consulted, appreciate their recommendations  Plan for ancef x 6 weeks  Repeat blood cultures sent given episodes of diaphoresis/chills  8/23 Blood cultures: no growth to date  MRSA swab negative  UA with micro unremarkable for infection   MRI - Discitis osteomyelitis again demonstrated at C5-C6, will repeat MRI C spine to evaluate for meningeal enhancement  Pericardial drain deferred by interventional cardiology  LUCRECIA deferred by cardiology, - will obtain in the future when c collar is discontinued    Acute osteomyelitis of cervical spine (HCC)  Assessment & Plan  8/20 MRI C-Spine: Imaging findings suspicious for discitis osteomyelitis at C5-C6. 3 mm epidural phlegmon/small abscess is also suspected. There is moderate resultant central stenosis and mild mass effect on the cord  8/27 MRI - Discitis osteomyelitis again demonstrated at C5-C6   Neurosurgery consulted   Upright xrays completed  Recommending wearing of C-collar which is currently in place  Recommending 6 weeks IV antibiotics and follow-up repeat imaging  Monitor neurologic checks  8/29 MRI cervical spine with stable findings and no meningeal enhancement    Acute pericarditis  Assessment & Plan  8/22 Echo: Limited echo for assessment of endocarditis.  The patient's aortic valve is difficult to assess due to calcification but there is no new significant regurgitation.  The mitral valve has hyperechoic thickening at the tips consistent with most likely calcification, these were  seen on the echocardiograms in July.  The tricuspid valve similarly was not well-visualized but no new regurgitation.  The pulmonary valve was not well-visualized. Off note patient has new moderate pericardial effusion without specific echocardiographic signs of tamponade  Patient with symptoms consistent with pericarditis  8/27 TTE - EF 50%, mod focal calcification mitral valve, moderate circumferential pericardial effusion, fluid with fibrinous appearance, no sign of tamponade         Plan:  Cardiology consulted, no plans for drainage   Completed Colchicine and Prednisone on 8/27  Close hemodynamic monitoring  Outpatient follow up with cardiology for monitoring of effusion    Type 2 diabetes mellitus (HCC)  Assessment & Plan  Lab Results   Component Value Date    HGBA1C 6.8 (H) 07/05/2024       Recent Labs     08/30/24  1539 08/30/24  2100 08/31/24  0556 08/31/24  1047   POCGLU 186* 138 166* 176*         Blood Sugar Average: Last 72 hrs:  (P) 155.5492715325451226  Home regimen: metformin  Continue diabetic diet and sliding scale coverage  Goal blood glucose 140-180      FINA (acute kidney injury) on CKD stage 2([facility])  Assessment & Plan  Baseline Cr 1-1.2  Developed FINA 8/23 with associated hyperkalemia     Plan:  Nephrology following, appreciate their recommendations  Villa removed  Avoid nephrotoxins  Strict I/Os  Trend renal function - improved  Outpatient follow up with nephrology      New onset a-fib (HCC)  Assessment & Plan  Developed new onset afib 8/21/8/22     Plan:  Amiodarone infusion running, goal to load to 8-10g prior to discontinuation  Amiodarone 200 mg PO BID  Metoprolol 25 mg BID -can uptitrate as BP tolerates   Eliquis for AC  Telemetry monitoring     History of hypertension  Assessment & Plan  BP is acceptable, continue current regimen    Constipation  Assessment & Plan  Continue bowel regimen  monitor    Diabetic ulcer of right foot (HCC)  Assessment & Plan  Lab Results   Component Value Date     HGBA1C 6.8 (H) 2024       Recent Labs     24  1539 24  2100 24  0556 24  1047   POCGLU 186* 138 166* 176*         Blood Sugar Average: Last 72 hrs:  (P) 155.2794841194881399  Follows as an outpatient with wound care  Likely source of MSSA bacteremia   Continue local wound care  Podiatry consulted  WBAT    Transaminitis  Assessment & Plan  Likely reactive to his infection  Improving gradually  Monitor LFTs periodically until resolved             VTE Pharmacologic Prophylaxis: VTE Score: 3 Moderate Risk (Score 3-4) - Pharmacological DVT Prophylaxis Ordered: apixaban (Eliquis).    Mobility:   Basic Mobility Inpatient Raw Score: 17  JH-HLM Goal: 5: Stand one or more mins  JH-HLM Achieved: 7: Walk 25 feet or more  JH-HLM Goal achieved. Continue to encourage appropriate mobility.    Patient Centered Rounds: I performed bedside rounds with nursing staff today.   Discussions with Specialists or Other Care Team Provider: nurse, KARINA    Total Time Spent on Date of Encounter in care of patient: 40 mins. This time was spent on one or more of the following: performing physical exam; counseling and coordination of care; obtaining or reviewing history; documenting in the medical record; reviewing/ordering tests, medications or procedures; communicating with other healthcare professionals and discussing with patient's family/caregivers.    Current Length of Stay: 8 day(s)  Current Patient Status: Inpatient   Certification Statement: The patient will continue to require additional inpatient hospital stay due to amiodarone loading  Discharge Plan: Anticipate discharge in 48 hrs to rehab facility.    Code Status: Level 1 - Full Code    Subjective:   Denies any new complaints. Has neck pain. Wants to know if he can remove the cervical collar to shave his face    Objective:     Vitals:   Temp (24hrs), Av.2 °F (36.8 °C), Min:97.9 °F (36.6 °C), Max:98.5 °F (36.9 °C)    Temp:  [97.9 °F (36.6 °C)-98.5 °F  (36.9 °C)] 98.5 °F (36.9 °C)  HR:  [72-92] 72  Resp:  [16-19] 19  BP: (111-134)/(65-81) 117/79  SpO2:  [92 %-99 %] 96 %  Body mass index is 30.77 kg/m².     Input and Output Summary (last 24 hours):     Intake/Output Summary (Last 24 hours) at 8/31/2024 1346  Last data filed at 8/31/2024 1100  Gross per 24 hour   Intake 1324.3 ml   Output 2650 ml   Net -1325.7 ml       Physical Exam:   Physical Exam  Constitutional:       Appearance: Normal appearance.   HENT:      Head: Normocephalic and atraumatic.      Nose: Nose normal.   Eyes:      Extraocular Movements: Extraocular movements intact.   Neck:      Comments: C-collar in place  Cardiovascular:      Rate and Rhythm: Normal rate. Rhythm irregular.   Pulmonary:      Effort: Pulmonary effort is normal.      Breath sounds: No wheezing or rales.   Skin:     General: Skin is warm and dry.   Neurological:      Mental Status: He is alert and oriented to person, place, and time.   Psychiatric:         Mood and Affect: Mood normal.         Behavior: Behavior normal.          Additional Data:     Labs:  Results from last 7 days   Lab Units 08/29/24  0414 08/28/24  0500 08/27/24  0500 08/26/24  0455   WBC Thousand/uL 15.12*   < > 11.51* 14.22*   HEMOGLOBIN g/dL 11.5*   < > 10.9* 11.2*   HEMATOCRIT % 38.1   < > 34.6* 36.9   PLATELETS Thousands/uL 415*   < > 432* 434*   SEGS PCT %  --   --   --  84*   LYMPHO PCT %  --   --  8* 5*   MONO PCT %  --   --  7 9   EOS PCT %  --   --  0 0    < > = values in this interval not displayed.     Results from last 7 days   Lab Units 08/31/24  0407 08/29/24  0414 08/28/24  0000   SODIUM mmol/L 137   < > 138   POTASSIUM mmol/L 4.4   < > 3.5   CHLORIDE mmol/L 94*   < > 93*   CO2 mmol/L 33*   < > 32   BUN mg/dL 65*   < > 87*   CREATININE mg/dL 1.21   < > 1.83*   ANION GAP mmol/L 10   < > 13   CALCIUM mg/dL 9.7   < > 9.5   ALBUMIN g/dL  --   --  3.5   TOTAL BILIRUBIN mg/dL  --   --  0.51   ALK PHOS U/L  --   --  218*   ALT U/L  --   --  7   AST  U/L  --   --  99*   GLUCOSE RANDOM mg/dL 155*   < > 137    < > = values in this interval not displayed.     Results from last 7 days   Lab Units 08/25/24  0405   INR  1.56*     Results from last 7 days   Lab Units 08/31/24  1047 08/31/24  0556 08/30/24  2100 08/30/24  1539 08/30/24  1032 08/30/24  0533 08/29/24  2035 08/29/24  1625 08/29/24  0633 08/28/24  2056 08/28/24  1542 08/28/24  1050   POC GLUCOSE mg/dl 176* 166* 138 186* 161* 135 140 203* 153* 155* 208* 191*                 Lines/Drains:  Invasive Devices       Central Venous Catheter Line  Duration             CVC Central Lines 08/29/24 Right Other (Comment) 2 days                    Central Line:  Goal for removal: Will discontinue when meds requiring line are completed.             Imaging: Reviewed radiology reports from this admission including: MRI spine    Recent Cultures (last 7 days):           Last 24 Hours Medication List:   Current Facility-Administered Medications   Medication Dose Route Frequency Provider Last Rate    acetaminophen  975 mg Oral Q6H SRIDHAR Ophelia Leon PA-C      amiodarone  200 mg Oral BID With Meals Ophelia Leon PA-C      apixaban  5 mg Oral BID Ophelia Leon PA-C      calcium carbonate  1,000 mg Oral Daily PRN Ophelia Leon PA-C      cefazolin  2,000 mg Intravenous Q8H Tom Andre MD 2,000 mg (08/31/24 1328)    chlorhexidine  15 mL Mouth/Throat Q12H SRIDHAR Ophelia Leon PA-C      insulin lispro  1-5 Units Subcutaneous TID AC Ophelia Leon PA-C      insulin lispro  1-5 Units Subcutaneous HS Ophelia Leon PA-C      methocarbamol  750 mg Oral Q6H SRIDHAR Ophelia Leon PA-C      metoprolol tartrate  25 mg Oral Q12H SRIDHAR Ophelia Leon PA-C      ondansetron  4 mg Intravenous Q6H PRN Ophelia Leon PA-C      oxyCODONE  10 mg Oral Q4H PRN Ophelia Leon PA-C      oxyCODONE  5 mg Oral Q4H PRN Ophelia Ragona, PA-C      polyethylene glycol  17 g Oral Daily Ophelia Leon PA-C      potassium chloride  20 mEq Oral Daily Yokasta Flores MD       senna-docusate sodium  2 tablet Oral BID Ophelia Leon PA-C          Today, Patient Was Seen By: Tom Andre MD    **Please Note: This note may have been constructed using a voice recognition system.**

## 2024-08-31 NOTE — ASSESSMENT & PLAN NOTE
Lab Results   Component Value Date    HGBA1C 6.8 (H) 07/05/2024       Recent Labs     08/30/24  1539 08/30/24  2100 08/31/24  0556 08/31/24  1047   POCGLU 186* 138 166* 176*         Blood Sugar Average: Last 72 hrs:  (P) [id number]  Follows as an outpatient with wound care  Likely source of MSSA bacteremia   Continue local wound care  Podiatry consulted  WBAT  Dressing instructions per podiatry.  Recommended outpatient follow-up with podiatry once the patient is discharged from acute rehab

## 2024-09-01 LAB
ANION GAP SERPL CALCULATED.3IONS-SCNC: 8 MMOL/L (ref 4–13)
BUN SERPL-MCNC: 48 MG/DL (ref 5–25)
CALCIUM SERPL-MCNC: 9.5 MG/DL (ref 8.4–10.2)
CHLORIDE SERPL-SCNC: 99 MMOL/L (ref 96–108)
CO2 SERPL-SCNC: 31 MMOL/L (ref 21–32)
CREAT SERPL-MCNC: 1.09 MG/DL (ref 0.6–1.3)
GFR SERPL CREATININE-BSD FRML MDRD: 69 ML/MIN/1.73SQ M
GLUCOSE SERPL-MCNC: 123 MG/DL (ref 65–140)
GLUCOSE SERPL-MCNC: 139 MG/DL (ref 65–140)
GLUCOSE SERPL-MCNC: 141 MG/DL (ref 65–140)
GLUCOSE SERPL-MCNC: 190 MG/DL (ref 65–140)
GLUCOSE SERPL-MCNC: 223 MG/DL (ref 65–140)
POTASSIUM SERPL-SCNC: 4.5 MMOL/L (ref 3.5–5.3)
SODIUM SERPL-SCNC: 138 MMOL/L (ref 135–147)

## 2024-09-01 PROCEDURE — 99232 SBSQ HOSP IP/OBS MODERATE 35: CPT | Performed by: INTERNAL MEDICINE

## 2024-09-01 PROCEDURE — 82948 REAGENT STRIP/BLOOD GLUCOSE: CPT

## 2024-09-01 PROCEDURE — 97116 GAIT TRAINING THERAPY: CPT

## 2024-09-01 PROCEDURE — 97535 SELF CARE MNGMENT TRAINING: CPT

## 2024-09-01 PROCEDURE — 80048 BASIC METABOLIC PNL TOTAL CA: CPT | Performed by: INTERNAL MEDICINE

## 2024-09-01 PROCEDURE — 97530 THERAPEUTIC ACTIVITIES: CPT

## 2024-09-01 RX ADMIN — INSULIN LISPRO 1 UNITS: 100 INJECTION, SOLUTION INTRAVENOUS; SUBCUTANEOUS at 12:06

## 2024-09-01 RX ADMIN — METHOCARBAMOL 750 MG: 750 TABLET ORAL at 09:35

## 2024-09-01 RX ADMIN — METHOCARBAMOL 750 MG: 750 TABLET ORAL at 04:30

## 2024-09-01 RX ADMIN — METHOCARBAMOL 750 MG: 750 TABLET ORAL at 21:38

## 2024-09-01 RX ADMIN — CHLORHEXIDINE GLUCONATE 0.12% ORAL RINSE 15 ML: 1.2 LIQUID ORAL at 09:34

## 2024-09-01 RX ADMIN — CEFAZOLIN SODIUM 2000 MG: 2 SOLUTION INTRAVENOUS at 21:39

## 2024-09-01 RX ADMIN — SENNOSIDES AND DOCUSATE SODIUM 2 TABLET: 50; 8.6 TABLET ORAL at 17:42

## 2024-09-01 RX ADMIN — CEFAZOLIN SODIUM 2000 MG: 2 SOLUTION INTRAVENOUS at 13:35

## 2024-09-01 RX ADMIN — METHOCARBAMOL 750 MG: 750 TABLET ORAL at 16:06

## 2024-09-01 RX ADMIN — APIXABAN 5 MG: 5 TABLET, FILM COATED ORAL at 17:42

## 2024-09-01 RX ADMIN — ACETAMINOPHEN 975 MG: 325 TABLET ORAL at 21:38

## 2024-09-01 RX ADMIN — APIXABAN 5 MG: 5 TABLET, FILM COATED ORAL at 09:35

## 2024-09-01 RX ADMIN — METOPROLOL TARTRATE 25 MG: 25 TABLET, FILM COATED ORAL at 09:35

## 2024-09-01 RX ADMIN — AMIODARONE HYDROCHLORIDE 200 MG: 200 TABLET ORAL at 16:06

## 2024-09-01 RX ADMIN — INSULIN LISPRO 2 UNITS: 100 INJECTION, SOLUTION INTRAVENOUS; SUBCUTANEOUS at 16:50

## 2024-09-01 RX ADMIN — POLYETHYLENE GLYCOL 3350 17 G: 17 POWDER, FOR SOLUTION ORAL at 09:35

## 2024-09-01 RX ADMIN — METOPROLOL TARTRATE 25 MG: 25 TABLET, FILM COATED ORAL at 21:38

## 2024-09-01 RX ADMIN — ACETAMINOPHEN 975 MG: 325 TABLET ORAL at 16:06

## 2024-09-01 RX ADMIN — SENNOSIDES AND DOCUSATE SODIUM 2 TABLET: 50; 8.6 TABLET ORAL at 09:35

## 2024-09-01 RX ADMIN — POTASSIUM CHLORIDE 20 MEQ: 1500 TABLET, EXTENDED RELEASE ORAL at 09:35

## 2024-09-01 RX ADMIN — ACETAMINOPHEN 975 MG: 325 TABLET ORAL at 09:35

## 2024-09-01 RX ADMIN — CEFAZOLIN SODIUM 2000 MG: 2 SOLUTION INTRAVENOUS at 06:31

## 2024-09-01 RX ADMIN — AMIODARONE HYDROCHLORIDE 200 MG: 200 TABLET ORAL at 06:31

## 2024-09-01 RX ADMIN — CHLORHEXIDINE GLUCONATE 0.12% ORAL RINSE 15 ML: 1.2 LIQUID ORAL at 21:39

## 2024-09-01 NOTE — PROGRESS NOTES
[facility]  Progress Note  Name: Augustus Coffman I  MRN: [id number]  Unit/Bed#: PPHP 505-01 I Date of Admission: 8/23/2024   Date of Service: 9/1/2024 I Hospital Day: 9    Assessment & Plan   * MSSA bacteremia  Assessment & Plan  Patient with prior admission with MSSA bacteremia on 7/28 treated with 7 days of antibiotics  Etiology likely 2/2 right foot ulcer  Complicated by cervical discitis  TTE without evidence of endocarditis     Plan:  ID consulted, appreciate their recommendations  Plan for ancef x 6 weeks  Repeat blood cultures sent given episodes of diaphoresis/chills  8/23 Blood cultures: no growth to date  MRSA swab negative  UA with micro unremarkable for infection   MRI - Discitis osteomyelitis again demonstrated at C5-C6, will repeat MRI C spine to evaluate for meningeal enhancement  Pericardial drain deferred by interventional cardiology  LUCRECIA deferred by cardiology, - will obtain in the future when c collar is discontinued  Check limited TTE in 1 month    Acute osteomyelitis of cervical spine (HCC)  Assessment & Plan  8/20 MRI C-Spine: Imaging findings suspicious for discitis osteomyelitis at C5-C6. 3 mm epidural phlegmon/small abscess is also suspected. There is moderate resultant central stenosis and mild mass effect on the cord  8/27 MRI - Discitis osteomyelitis again demonstrated at C5-C6   Neurosurgery consulted   Upright xrays completed  Recommending wearing of C-collar which is currently in place  Recommending 6 weeks IV antibiotics and follow-up repeat imaging  Monitor neurologic checks  8/29 MRI cervical spine with stable findings and no meningeal enhancement    Acute pericarditis  Assessment & Plan  8/22 Echo: Limited echo for assessment of endocarditis.  The patient's aortic valve is difficult to assess due to calcification but there is no new significant regurgitation.  The mitral valve has hyperechoic thickening at the tips consistent with most likely  calcification, these were seen on the echocardiograms in July.  The tricuspid valve similarly was not well-visualized but no new regurgitation.  The pulmonary valve was not well-visualized. Off note patient has new moderate pericardial effusion without specific echocardiographic signs of tamponade  Patient with symptoms consistent with pericarditis  8/27 TTE - EF 50%, mod focal calcification mitral valve, moderate circumferential pericardial effusion, fluid with fibrinous appearance, no sign of tamponade         Plan:  Cardiology consulted, no plans for drainage   Completed Colchicine and Prednisone on 8/27  Close hemodynamic monitoring  Outpatient follow up with cardiology for monitoring of effusion    Type 2 diabetes mellitus (HCC)  Assessment & Plan  Lab Results   Component Value Date    HGBA1C 6.8 (H) 07/05/2024       Recent Labs     08/30/24  1539 08/30/24  2100 08/31/24  0556 08/31/24  1047   POCGLU 186* 138 166* 176*         Blood Sugar Average: Last 72 hrs:  (P) [id number]  Home regimen: metformin  Continue diabetic diet and sliding scale coverage  Goal blood glucose 140-180      FINA (acute kidney injury) on CKD stage 2(HCC)  Assessment & Plan  Baseline Cr 1-1.2  Developed FINA 8/23 with associated hyperkalemia     Plan:  Nephrology following, appreciate their recommendations  Villa removed  Avoid nephrotoxins  Strict I/Os  Trend renal function - improved  Outpatient follow up with nephrology      New onset a-fib (HCC)  Assessment & Plan  Developed new onset afib 8/21/8/22     Plan:  Amiodarone infusion running, goal to load to 8-10g prior to discontinuation  Amiodarone 200 mg PO BID  Metoprolol 25 mg BID -can uptitrate as BP tolerates   Eliquis for AC  Telemetry monitoring     History of hypertension  Assessment & Plan  BP is acceptable, continue current regimen    Constipation  Assessment & Plan  Continue bowel regimen  monitor    Diabetic ulcer of right foot (HCC)  Assessment & Plan  Lab Results    Component Value Date    HGBA1C 6.8 (H) 2024       Recent Labs     24  1539 24  2100 24  0556 24  1047   POCGLU 186* 138 166* 176*         Blood Sugar Average: Last 72 hrs:  (P) 155.2651682284509713  Follows as an outpatient with wound care  Likely source of MSSA bacteremia   Continue local wound care  Podiatry consulted  WBAT    Transaminitis  Assessment & Plan  Likely reactive to his infection  Improving gradually  Monitor LFTs periodically until resolved             VTE Pharmacologic Prophylaxis: VTE Score: 3 Moderate Risk (Score 3-4) - Pharmacological DVT Prophylaxis Ordered: apixaban (Eliquis).    Mobility:   Basic Mobility Inpatient Raw Score: 17  JH-HLM Goal: 5: Stand one or more mins  JH-HLM Achieved: 8: Walk 250 feet ot more  JH-HLM Goal achieved. Continue to encourage appropriate mobility.    Patient Centered Rounds: I performed bedside rounds with nursing staff today.   Discussions with Specialists or Other Care Team Provider: nurse, KARINA    Total Time Spent on Date of Encounter in care of patient: 40 mins. This time was spent on one or more of the following: performing physical exam; counseling and coordination of care; obtaining or reviewing history; documenting in the medical record; reviewing/ordering tests, medications or procedures; communicating with other healthcare professionals and discussing with patient's family/caregivers.    Current Length of Stay: 9 day(s)  Current Patient Status: Inpatient   Certification Statement: The patient will continue to require additional inpatient hospital stay due to amiodarone loading  Discharge Plan: Anticipate discharge in 48 hrs to rehab facility.    Code Status: Level 1 - Full Code    Subjective:   Denies any new complaints. Has neck pain. Wants to know if he can remove the cervical collar to shave his face    Objective:     Vitals:   Temp (24hrs), Av.4 °F (36.9 °C), Min:97.4 °F (36.3 °C), Max:99.4 °F (37.4 °C)    Temp:   [97.4 °F (36.3 °C)-99.4 °F (37.4 °C)] 98.4 °F (36.9 °C)  HR:  [76-88] 85  Resp:  [16-19] 19  BP: (110-139)/(65-92) 113/78  SpO2:  [94 %-97 %] 95 %  Body mass index is 30.71 kg/m².     Input and Output Summary (last 24 hours):     Intake/Output Summary (Last 24 hours) at 9/1/2024 1433  Last data filed at 9/1/2024 1300  Gross per 24 hour   Intake 870 ml   Output 1825 ml   Net -955 ml       Physical Exam:   Physical Exam  Constitutional:       Appearance: Normal appearance.   HENT:      Head: Normocephalic and atraumatic.      Nose: Nose normal.   Eyes:      Extraocular Movements: Extraocular movements intact.   Neck:      Comments: C-collar in place  Cardiovascular:      Rate and Rhythm: Normal rate. Rhythm irregular.   Pulmonary:      Effort: Pulmonary effort is normal.      Breath sounds: No wheezing or rales.   Skin:     General: Skin is warm and dry.   Neurological:      Mental Status: He is alert and oriented to person, place, and time.   Psychiatric:         Mood and Affect: Mood normal.         Behavior: Behavior normal.          Additional Data:     Labs:  Results from last 7 days   Lab Units 08/29/24  0414 08/28/24  0500 08/27/24  0500 08/26/24  0455   WBC Thousand/uL 15.12*   < > 11.51* 14.22*   HEMOGLOBIN g/dL 11.5*   < > 10.9* 11.2*   HEMATOCRIT % 38.1   < > 34.6* 36.9   PLATELETS Thousands/uL 415*   < > 432* 434*   SEGS PCT %  --   --   --  84*   LYMPHO PCT %  --   --  8* 5*   MONO PCT %  --   --  7 9   EOS PCT %  --   --  0 0    < > = values in this interval not displayed.     Results from last 7 days   Lab Units 09/01/24  0636 08/29/24  0414 08/28/24  0000   SODIUM mmol/L 138   < > 138   POTASSIUM mmol/L 4.5   < > 3.5   CHLORIDE mmol/L 99   < > 93*   CO2 mmol/L 31   < > 32   BUN mg/dL 48*   < > 87*   CREATININE mg/dL 1.09   < > 1.83*   ANION GAP mmol/L 8   < > 13   CALCIUM mg/dL 9.5   < > 9.5   ALBUMIN g/dL  --   --  3.5   TOTAL BILIRUBIN mg/dL  --   --  0.51   ALK PHOS U/L  --   --  218*   ALT U/L  --    --  7   AST U/L  --   --  99*   GLUCOSE RANDOM mg/dL 139   < > 137    < > = values in this interval not displayed.           Results from last 7 days   Lab Units 09/01/24  1037 09/01/24  0614 08/31/24 2035 08/31/24  1604 08/31/24  1047 08/31/24  0556 08/30/24  2100 08/30/24  1539 08/30/24  1032 08/30/24  0533 08/29/24  2035 08/29/24  1625   POC GLUCOSE mg/dl 190* 141* 180* 163* 176* 166* 138 186* 161* 135 140 203*                 Lines/Drains:  Invasive Devices       Central Venous Catheter Line  Duration             CVC Central Lines 08/29/24 Right Other (Comment) 3 days                    Central Line:  Goal for removal: Will discontinue when meds requiring line are completed.             Imaging: Reviewed radiology reports from this admission including: MRI spine    Recent Cultures (last 7 days):           Last 24 Hours Medication List:   Current Facility-Administered Medications   Medication Dose Route Frequency Provider Last Rate    acetaminophen  975 mg Oral Q6H SRIDHAR Ophelia Leon PA-C      amiodarone  200 mg Oral BID With Meals Ophelia Leon PA-C      apixaban  5 mg Oral BID Ophelia Leon PA-C      calcium carbonate  1,000 mg Oral Daily PRN Ophelia Leon PA-C      cefazolin  2,000 mg Intravenous Q8H Tom Andre MD 2,000 mg (09/01/24 1335)    chlorhexidine  15 mL Mouth/Throat Q12H SRIDHAR Ophelia Leon PA-C      insulin lispro  1-5 Units Subcutaneous TID AC Ophelia Leon PA-C      insulin lispro  1-5 Units Subcutaneous HS Ophelia Leon PA-C      methocarbamol  750 mg Oral Q6H SRIDHAR Ophelia Leon PA-C      metoprolol tartrate  25 mg Oral Q12H SRIDHAR Ophelia Leon PA-C      ondansetron  4 mg Intravenous Q6H PRN Ophelia Leon PA-C      oxyCODONE  10 mg Oral Q4H PRN Ophelia Leon PA-C      oxyCODONE  5 mg Oral Q4H PRN Ophelia Leon PA-C      polyethylene glycol  17 g Oral Daily Ophelia Leon PA-C      senna-docusate sodium  2 tablet Oral BID Ophelia Leon PA-C          Today, Patient Was Seen By: Tom Andre,  MD    **Please Note: This note may have been constructed using a voice recognition system.**

## 2024-09-01 NOTE — PHYSICAL THERAPY NOTE
Physical Therapy Progress Note     09/01/24 0918   PT Last Visit   PT Visit Date 09/01/24   Note Type   Note Type Treatment for insurance authorization   Pain Assessment   Pain Assessment Tool FLACC   Pain Rating: FLACC (Rest) - Face 0   Pain Rating: FLACC (Rest) - Legs 0   Pain Rating: FLACC (Rest) - Activity 0   Pain Rating: FLACC (Rest) - Cry 1   Pain Rating: FLACC (Rest) - Consolability 0   Score: FLACC (Rest) 1   Pain Rating: FLACC (Activity) - Face 1   Pain Rating: FLACC (Activity) - Legs 0   Pain Rating: FLACC (Activity) - Activity 0   Pain Rating: FLACC (Activity) - Cry 1   Pain Rating: FLACC (Activity) - Consolability 0   Score: FLACC (Activity) 2   Restrictions/Precautions   RLE Weight Bearing Per Order WBAT   Braces or Orthoses C/S Collar   Other Precautions Fall Risk;Pain;Spinal precautions;Telemetry   Subjective   Subjective Pt encountered seated in recliner, pleasant and agreeable to treatment.  Reports feeling better each day, but continues to have ongoing neck pain with mobiilty.  States he has been able to walk to & from bathroom in room [facility], and walked with family in hallway yesterday without need for seated rest.  Pt motivated to participate in PT & is eager to d/c hospital to begin rehab.   Transfers   Sit to Stand 5  Supervision   Additional items Assist x 1   Stand to Sit 4  Minimal assistance   Additional items Assist x 1;Armrests;Increased time required   Ambulation/Elevation   Gait pattern Short stride;Foward flexed;Inconsistent mraie;Shuffling;Decreased foot clearance;Improper Weight shift   Gait Assistance 4  Minimal assist   Additional items Assist x 1   Assistive Device Rolling walker   Distance 70' x 2, seated rest, then 90' x 2   Balance   Static Sitting Fair   Static Standing Fair -   Ambulatory Fair -   Activity Tolerance   Activity Tolerance Patient tolerated treatment well   Nurse Made Aware ANDERSON Tapia   Assessment   Prognosis Good   Problem List Decreased  strength;Decreased endurance;Impaired balance;Decreased mobility;Pain   Assessment pt demonstrated miproved functional endurance today, ambulating repeated household distances with use of RW & no LOB or complaints of excessive fatigue.  Did so on room air with stable vitals WNL.  Seated rest taken to allow this PTA to adjust RW for height & comfort as well as instruct pt in postural extension at rest along with scapular retraction in effor to reduce kypotic posture while ambualtiong that will improve his environmental awareness & reduce reliance on RW.  Pt will likely require further interventions to addres upper thoracic & cervical spine posture as he continues to recover.  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   Goals   Patient Goals to get better & get back to working   STG Expiration Date 09/05/24   PT Treatment Day 4   Plan   Treatment/Interventions Functional transfer training;LE strengthening/ROM;Elevations;Therapeutic exercise;Endurance training;Patient/family training;Bed mobility;Equipment eval/education;Gait training   Progress Progressing toward goals   PT Frequency 3-5x/wk   Discharge Recommendation   Rehab Resource Intensity Level, PT I (Maximum Resource Intensity)   Equipment Recommended Walker   Walker Package Recommended Wheeled walker   AM-PAC Basic Mobility Inpatient   Turning in Flat Bed Without Bedrails 3   Lying on Back to Sitting on Edge of Flat Bed Without Bedrails 3   Moving Bed to Chair 3   Standing Up From Chair Using Arms 3   Walk in Room 3   Climb 3-5 Stairs With Railing 2   Basic Mobility Inpatient Raw Score 17   Basic Mobility Standardized Score 39.67   [facility] Highest Level Of Mobility   -HLM Goal 5: Stand one or more mins   JH-HLM Achieved 8: Walk 250 feet ot more       Brigido Mora PTA    An [facility] Basic Mobility Raw Score less  than 17 suggests pt would benefit from post acute rehab.  Please also refer to the recommendation of the Physical Therapist for safe discharge planning.

## 2024-09-01 NOTE — OCCUPATIONAL THERAPY NOTE
Occupational Therapy Treatment Note:      09/01/24 1005   OT Last Visit   OT Visit Date 09/01/24   Note Type   Note Type Treatment   Pain Assessment   Pain Score No Pain   Restrictions/Precautions   RLE Weight Bearing Per Order WBAT  (has surgical shoe)   Braces or Orthoses C/S Collar   Other Precautions Fall Risk;Spinal precautions;Telemetry;WBS   ADL   Where Assessed Sitting at sink   Grooming Assistance 3  Moderate Assistance  (mod asst in sitting  for c collar management and to change  pads/ wash neck/ shave. sba for oral care in stance sinkside)   UB Bathing Assistance 4  Minimal Assistance   LB Bathing Assistance 3  Moderate Assistance   UB Dressing Assistance 3  Moderate Assistance   LB Dressing Assistance 2  Maximal Assistance   Toileting Assistance  4  Minimal Assistance   Functional Standing Tolerance   Time 5 min sinkside during oral care. pt did not use assistive device during grooming / toileting   Transfers   Sit to Stand 5  Supervision   Stand to Sit 5  Supervision   Additional Comments pt walked to from bathroom without a.d. per his request. pt with guarding and seeks unilateral support at times   Functional Mobility   Functional Mobility 4  Minimal assistance   Additional items   (no device)   Cognition   Overall Cognitive Status WFL   Activity Tolerance   Activity Tolerance Patient tolerated treatment well   Assessment   Assessment pt participated in am ot session and was seen focusing on  adls and c collar management/ grooming / shaving. pt requires mod to max asst lb adls sba to mod asst grooming and ub care. pt requires mod asst for clothing management during toileting. pt did not use a.d. pta and does not wish to use one post rehab. he needs to function independently as his wife is busy with own schedule as  times. pt currently needs mod/max asst for c collar management.  pt is motivated and cooperative this session. pt with reports of moderate fatigue after lengthy pt then ot sessions   Plan    Treatment Interventions ADL retraining;Functional transfer training;Endurance training;Patient/family training   Goal Expiration Date 09/09/24   OT Treatment Day 3   OT Frequency 2-3x/wk   Discharge Recommendation   Rehab Resource Intensity Level, OT I (Maximum Resource Intensity)     April A Storm

## 2024-09-01 NOTE — PLAN OF CARE
Problem: OCCUPATIONAL THERAPY ADULT  Goal: Performs self-care activities at highest level of function for planned discharge setting.  See evaluation for individualized goals.  Description: Treatment Interventions: ADL retraining, Functional transfer training, Endurance training, Patient/family training, Equipment evaluation/education, Continued evaluation, Energy conservation, Compensatory technique education          See flowsheet documentation for full assessment, interventions and recommendations.   Outcome: Progressing  Note: Limitation: Decreased ADL status, Decreased Safe judgement during ADL, Decreased endurance, Decreased self-care trans, Decreased high-level ADLs  Prognosis: Fair  Assessment: pt participated in am ot session and was seen focusing on  adls and c collar management/ grooming / shaving. pt requires mod to max asst lb adls sba to mod asst grooming and ub care. pt requires mod asst for clothing management during toileting. pt did not use a.d. pta and does not wish to use one post rehab. he needs to function independently as his wife is busy with own schedule as  times. pt currently needs mod/max asst for c collar management.  pt is motivated and cooperative this session. pt with reports of moderate fatigue after lengthy pt then ot sessions     Rehab Resource Intensity Level, OT: I (Maximum Resource Intensity)     April A Storm

## 2024-09-01 NOTE — PLAN OF CARE
Problem: PHYSICAL THERAPY ADULT  Goal: Performs mobility at highest level of function for planned discharge setting.  See evaluation for individualized goals.  Description: Treatment/Interventions: Functional transfer training, LE strengthening/ROM, Elevations, Therapeutic exercise, Endurance training, Equipment eval/education, Bed mobility, Gait training, Spoke to nursing, Spoke to case management, OT          See flowsheet documentation for full assessment, interventions and recommendations.  Outcome: Progressing  Note: Prognosis: Good  Problem List: Decreased strength, Decreased endurance, Impaired balance, Decreased mobility, Pain  Assessment: pt demonstrated miproved functional endurance today, ambulating repeated household distances with use of RW & no LOB or complaints of excessive fatigue.  Did so on room air with stable vitals WNL.  Seated rest taken to allow this PTA to adjust RW for height & comfort as well as instruct pt in postural extension at rest along with scapular retraction in effor to reduce kypotic posture while ambualtiong that will improve his environmental awareness & reduce reliance on RW.  Pt will likely require further interventions to addres upper thoracic & cervical spine posture as he continues to recover.  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  Barriers to Discharge: Inaccessible home environment     Rehab Resource Intensity Level, PT: I (Maximum Resource Intensity)    See flowsheet documentation for full assessment.

## 2024-09-01 NOTE — PROGRESS NOTES
[facility]    Cardiology Progress Note  Augustus Coffman 68 y.o. male   YOB: 1955 MRN: [id number]  Unit/Bed#: [facility] 505-01 Encounter: [id number]      Subjective:   He remains free of chest pain or shortness of breath    Assessments  68-year-old gentleman with sepsis (MSSA bacteremia), cervical discitis, in cervical collar, acute kidney injury (peaked Cr 3.38 - now improving), had atrial fibrillation and pericardial effusion, for which she he has been on amiodarone infusion + PO amiodarone.   Principal Problem:    MSSA bacteremia  Active Problems:    History of hypertension    Type 2 diabetes mellitus (HCC)    FINA (acute kidney injury) on CKD stage 2(HCC)    Acute osteomyelitis of cervical spine (HCC)    Transaminitis    New onset a-fib (HCC)    Acute pericarditis    Diabetic ulcer of right foot (HCC)    Constipation        Plan  Paroxysmal Atrial fibrillation / flutter  initially had Afib on 8/24/24, and then converted to flutter - ECGs personally reviewed  Now back in sinus rhythm  Currently on IV amiodarone infusion at 0.5+ p.o. amiodarone 200 mg twice daily   He has already received about 7.6 g (4.6 g IV + 3 g p.o.) of amiodarone (has been receiving PO as well since 8/25/24)   Discontinue IV amiodarone, and complete remainder load with PO amiodarone 200 mg bid  Use amiodarone 200 mg bid x 5 days, and then decrease to amiodarone 200 mg daily for the next 3 months.    Pericardial effusion  Noted on echocardiogram on 8/22/2024, was moderate in size at that time --> has been stable/slightly decreasing on follow up echos on 8/24 & 8/27  With decreasing size and fibrinous appearing fluid, pericardiocentesis has been held off  Continues to do well  Follow-up echocardiogram in 4 weeks or earlier - if any new symptoms    MSSA bacteremia  Blood culture 8/20 positive for MSSA  Urine culture 8/21 - Enterococcus faecalis (10-19k cfu/ml)   Follow up blood cultures thereafter have been  "negative  Has been recommended LUCRECIA, but currently limited by presence of Cervical collar and limitations with mobility related to same  Plan for LUCRECIA eventually post removal of cervical collar -can be done outpatient  Can follow-up with transthoracic echocardiogram in the interim, if any new concerns    Discussed with primary medical service .  He is awaiting rehab placement at [facility]. We will see as needed. Please call with questions.    Review of Systems   All other systems reviewed and are negative.      Telemetry Review: NSR 80s, no Afib, no significant events    Objective:   Vitals: Blood pressure 139/92, pulse 88, temperature 98.2 °F (36.8 °C), resp. rate 18, height 6' 3\" (1.905 m), weight 111 kg (245 lb 11.2 oz), SpO2 97%., Body mass index is 30.71 kg/m².,   Orthostatic Blood Pressures      Flowsheet Row Most Recent Value   Blood Pressure 139/92 filed at 2024 0714   Patient Position - Orthostatic VS Sitting filed at 2024 0257           Systolic (24hrs), Av , Min:110 , Max:139     Diastolic (24hrs), Av, Min:65, Max:92    Wt Readings from Last 5 Encounters:   24 111 kg (245 lb 11.2 oz)   24 112 kg (248 lb)   24 115 kg (253 lb 8.5 oz)   24 118 kg (260 lb)   24 119 kg (262 lb)     I/O          0701   0700  0701   0700  0701   0700    P.O. 960 360     I.V. (mL/kg) 547.3 (4.9) 514.3 (4.6)     IV Piggyback  100     Total Intake(mL/kg) 1507.3 (13.5) 974.3 (8.7)     Urine (mL/kg/hr) 2250 (0.8) 3350 (1.2)     Stool 0      Total Output 2250 3350     Net -742.7 -2375.7            Unmeasured Urine Occurrence 1 x      Unmeasured Stool Occurrence 1 x                  Physical Exam  Vitals and nursing note reviewed.   Constitutional:       General: He is awake. He is not in acute distress.     Appearance: He is well-developed. He is obese. He is not ill-appearing or diaphoretic.      Comments: Noted to be in a cervical collar   HENT:      " Head: Normocephalic and atraumatic.      Nose: No congestion.   Eyes:      General: No scleral icterus.     Conjunctiva/sclera: Conjunctivae normal.   Neck:      Vascular: No carotid bruit or JVD.   Cardiovascular:      Rate and Rhythm: Normal rate and regular rhythm.      Heart sounds: Normal heart sounds. No murmur heard.     No friction rub. No gallop.   Pulmonary:      Effort: Pulmonary effort is normal. No respiratory distress.      Breath sounds: Normal breath sounds. No wheezing or rales.   Chest:      Chest wall: No tenderness.   Abdominal:      General: There is no distension.      Palpations: Abdomen is soft.      Tenderness: There is no abdominal tenderness.   Musculoskeletal:         General: No swelling, tenderness or deformity.      Cervical back: Neck supple. No muscular tenderness.      Right lower leg: No edema.      Left lower leg: No edema.   Skin:     General: Skin is warm.   Neurological:      General: No focal deficit present.      Mental Status: He is alert and oriented to person, place, and time. Mental status is at baseline.   Psychiatric:         Mood and Affect: Mood normal.         Behavior: Behavior normal. Behavior is cooperative.         Thought Content: Thought content normal.           Laboratory Results: personally reviewed        CBC with diff:   Results from last 7 days   Lab Units 08/29/24  0414 08/28/24  0500 08/27/24  0500 08/26/24  0455   WBC Thousand/uL 15.12* 12.78* 11.51* 14.22*   HEMOGLOBIN g/dL 11.5* 11.4* 10.9* 11.2*   HEMATOCRIT % 38.1 37.6 34.6* 36.9   MCV fL 83 81* 81* 80*   PLATELETS Thousands/uL 415* 450* 432* 434*   RBC Million/uL 4.59 4.64 4.28 4.61   MCH pg 25.1* 24.6* 25.5* 24.3*   MCHC g/dL 30.2* 30.3* 31.5 30.4*   RDW % 18.0* 17.5* 17.2* 17.2*   MPV fL 9.9 10.2 9.9 9.9   NRBC AUTO /100 WBCs  --   --   --  0   NRBC /100 WBC  --   --  2  --          CMP:  Results from last 7 days   Lab Units 09/01/24  0636 08/31/24  0407 08/30/24  0539 08/29/24  0414  "08/28/24  0000 08/27/24  1340 08/27/24  0444 08/26/24  0508   POTASSIUM mmol/L 4.5 4.4 4.0 3.6 3.5 4.0 3.2* 4.0   CHLORIDE mmol/L 99 94* 94* 91* 93* 91* 89* 91*   CO2 mmol/L 31 33* 32 33* 32 32 33* 30   BUN mg/dL 48* 65* 79* 91* 87* 90* 90* 70*   CREATININE mg/dL 1.09 1.21 1.40* 1.66* 1.83* 2.24* 2.59* 2.73*   CALCIUM mg/dL 9.5 9.7 9.8 9.8 9.5 9.4 9.3 9.7   AST U/L  --   --   --   --  99*  --  257* 513*   ALT U/L  --   --   --   --  7  --  15 33   ALK PHOS U/L  --   --   --   --  218*  --  268* 302*   EGFR ml/min/1.73sq m 69 61 51 42 37 29 24 23         BMP:  Results from last 7 days   Lab Units 09/01/24  0636 08/31/24  0407 08/30/24  0539 08/29/24  0414 08/28/24  0000 08/27/24  1340 08/27/24  0444   POTASSIUM mmol/L 4.5 4.4 4.0 3.6 3.5 4.0 3.2*   CHLORIDE mmol/L 99 94* 94* 91* 93* 91* 89*   CO2 mmol/L 31 33* 32 33* 32 32 33*   BUN mg/dL 48* 65* 79* 91* 87* 90* 90*   CREATININE mg/dL 1.09 1.21 1.40* 1.66* 1.83* 2.24* 2.59*   CALCIUM mg/dL 9.5 9.7 9.8 9.8 9.5 9.4 9.3       BNP: No results for input(s): \"BNP\" in the last 72 hours.    Magnesium:   Results from last 7 days   Lab Units 08/29/24  0414 08/28/24  0000 08/27/24  0444 08/26/24  0508   MAGNESIUM mg/dL 2.0 2.2 2.1 2.3       Coags:   Results from last 7 days   Lab Units 08/28/24  0605 08/27/24  2313 08/27/24  1626 08/27/24  0952 08/26/24  2019 08/26/24  1342 08/26/24  0453   PTT seconds 56* 55* 34 35* 68* 69* 59*       TSH:        Hemoglobin A1C       Lipid Profile:       Meds/Allergies   all current active meds have been reviewed and current meds:   Current Facility-Administered Medications   Medication Dose Route Frequency    acetaminophen (TYLENOL) tablet 975 mg  975 mg Oral Q6H SRIDHAR    amiodarone tablet 200 mg  200 mg Oral BID With Meals    apixaban (ELIQUIS) tablet 5 mg  5 mg Oral BID    calcium carbonate (TUMS) chewable tablet 1,000 mg  1,000 mg Oral Daily PRN    ceFAZolin (ANCEF) IVPB (premix in dextrose) 2,000 mg 50 mL  2,000 mg Intravenous Q8H    " chlorhexidine (PERIDEX) 0.12 % oral rinse 15 mL  15 mL Mouth/Throat Q12H SRIDHAR    insulin lispro (HumALOG/ADMELOG) 100 units/mL subcutaneous injection 1-5 Units  1-5 Units Subcutaneous TID AC    insulin lispro (HumALOG/ADMELOG) 100 units/mL subcutaneous injection 1-5 Units  1-5 Units Subcutaneous HS    methocarbamol (ROBAXIN) tablet 750 mg  750 mg Oral Q6H SRIDHAR    metoprolol tartrate (LOPRESSOR) tablet 25 mg  25 mg Oral Q12H SRIDHAR    ondansetron (ZOFRAN) injection 4 mg  4 mg Intravenous Q6H PRN    oxyCODONE (ROXICODONE) immediate release tablet 10 mg  10 mg Oral Q4H PRN    oxyCODONE (ROXICODONE) IR tablet 5 mg  5 mg Oral Q4H PRN    polyethylene glycol (MIRALAX) packet 17 g  17 g Oral Daily    potassium chloride (Klor-Con M20) CR tablet 20 mEq  20 mEq Oral Daily    senna-docusate sodium (SENOKOT S) 8.6-50 mg per tablet 2 tablet  2 tablet Oral BID       Medications Prior to Admission:     allopurinol (ZYLOPRIM) 300 mg tablet    amLODIPine (NORVASC) 10 mg tablet    aspirin 81 mg chewable tablet    Cholecalciferol 100 MCG (4000 UT) CAPS    cyclobenzaprine (FLEXERIL) 10 mg tablet    Diclofenac Sodium (VOLTAREN) 1 %    Diclofenac Sodium (VOLTAREN) 1 %    Diclofenac Sodium (VOLTAREN) 1 %    Empagliflozin (Jardiance) 25 MG TABS    hydroCHLOROthiazide 25 mg tablet    ketorolac (TORADOL) 10 mg tablet    lidocaine (Lidoderm) 5 %    lidocaine (LMX) 4 % cream    losartan (COZAAR) 100 MG tablet    metFORMIN (GLUCOPHAGE) 500 mg tablet    metoprolol succinate (TOPROL-XL) 100 mg 24 hr tablet    Multiple Vitamins-Minerals (Centrum Silver 50+Men) TABS    oxyCODONE (Roxicodone) 5 immediate release tablet    rosuvastatin (CRESTOR) 40 MG tablet    tiZANidine (ZANAFLEX) 4 mg tablet           Cardiac testing: reviewed  Results for orders placed during the hospital encounter of 07/06/21    Echo complete with contrast if indicated    [facility]  [address]  [place] [id number] [phone]    Transthoracic  Echocardiogram  2D, M-mode, Doppler, and Color Doppler    Study date:  2021    Patient: CARMELO RHODES  MR number: [id number]  Account number: [id number]  : 1955  Age: 65 years  Gender: Male  Status: Outpatient  Location: [facility]  Height: 76 in  Weight: 298.3 lb  BP: 126/ 82 mmHg    Indications: AAA with out rupture..    Diagnoses: I71.2 - Thoracic aortic aneurysm, without rupture    Sonographer:  MARSHALL Pérez  Primary Physician:  Bolivar Castaneda MD  Referring Physician:  Bolivar Castaneda MD  Group:  [facility]  Interpreting Physician:  Eriberto Lopez MD    SUMMARY    LEFT VENTRICLE:  Systolic function was normal. Ejection fraction was estimated to be 60 %.  There were no regional wall motion abnormalities.  Concentric hypertrophy was present.  Doppler parameters were consistent with abnormal left ventricular relaxation (grade 1 diastolic dysfunction).    RIGHT VENTRICLE:  The ventricle was mildly dilated.  Systolic function was normal.    LEFT ATRIUM:  The atrium was mildly dilated.    RIGHT ATRIUM:  The atrium was mildly dilated.    MITRAL VALVE:  There was moderate annular calcification.    TRICUSPID VALVE:  There was trace regurgitation.  Pulmonary artery systolic pressure was within the normal range.    PULMONIC VALVE:  There was trace regurgitation.    AORTA:  The root exhibited upper limit of normal size (3.8 cm).    HISTORY: PRIOR HISTORY: Risk factors: hypertension, oral hypoglycemic-treated diabetes, and medication-treated hypercholesterolemia.    PROCEDURE: The study was performed in the [facility]. This was a routine study. The transthoracic approach was used. The study included complete 2D imaging, M-mode, complete spectral Doppler, and color Doppler. The  heart rate was 66 bpm, at the start of the study. This was a technically difficult study.    LEFT VENTRICLE: Size was normal. Systolic function was normal.  Ejection fraction was estimated to be 60 %. There were no regional wall motion abnormalities. Concentric hypertrophy was present. No evidence of apical thrombus. DOPPLER:  Doppler parameters were consistent with abnormal left ventricular relaxation (grade 1 diastolic dysfunction).    RIGHT VENTRICLE: The ventricle was mildly dilated. Systolic function was normal. Wall thickness was normal.    LEFT ATRIUM: The atrium was mildly dilated.    RIGHT ATRIUM: The atrium was mildly dilated.    MITRAL VALVE: There was moderate annular calcification. There was calcification of the anterior leaflet. There was normal leaflet separation. DOPPLER: The transmitral velocity was within the normal range. There was no evidence for  stenosis. There was no significant regurgitation.    AORTIC VALVE: The valve was not visualized well enough to rule out a bicuspid morphology. Leaflets exhibited mildly to moderately increased thickness and normal cuspal separation. DOPPLER: Transaortic velocity was within the normal range.  There was no evidence for stenosis. There was no significant regurgitation.    TRICUSPID VALVE: The valve structure was normal. There was normal leaflet separation. DOPPLER: The transtricuspid velocity was within the normal range. There was no evidence for stenosis. There was trace regurgitation. Pulmonary artery  systolic pressure was within the normal range.    PULMONIC VALVE: Leaflets exhibited normal thickness, no calcification, and normal cuspal separation. DOPPLER: The transpulmonic velocity was within the normal range. There was trace regurgitation.    PERICARDIUM: There was no pericardial effusion. The pericardium was normal in appearance.    AORTA: The root exhibited upper limit of normal size (3.8 cm).    SYSTEMIC VEINS: IVC: The inferior vena cava was normal in size.    SYSTEM MEASUREMENT TABLES    2D  %FS: 46.54 %  Ao Diam: 3.83 cm  EDV(Teich): 78.78 ml  EF(Teich): 78.29 %  ESV(Teich): 17.11 ml  IVSd: 1.43  cm  LA Area: 21.83 cm2  LA Diam: 4.18 cm  LVEDV MOD A4C: 107.4 ml  LVEF MOD A4C: 48.1 %  LVESV MOD A4C: 55.74 ml  LVIDd: 4.2 cm  LVIDs: 2.25 cm  LVLd A4C: 8.22 cm  LVLs A4C: 7.29 cm  LVOT Diam: 2.72 cm  LVPWd: 1.46 cm  RA Area: 23.57 cm2  RVIDd: 4.33 cm  RWT: 0.7  SV MOD A4C: 51.66 ml  SV(Teich): 61.67 ml    CW  AV Env.Ti: 283.16 ms  AV MaxP.49 mmHg  AV VTI: 50.96 cm  AV Vmax: 2.52 m/s  AV Vmean: 1.8 m/s  AV meanP.64 mmHg  TR MaxP.97 mmHg  TR Vmax: 2.29 m/s    MM  TAPSE: 2.45 cm    PW  IAIN (VTI): 2.39 cm2  IAIN Vmax: 2.26 cm2  AVAI (VTI): 0 cm2/m2  AVAI Vmax: 0 cm2/m2  E': 0.07 m/s  E/E': 11.39  LVOT Env.Ti: 304.47 ms  LVOT VTI: 21 cm  LVOT Vmax: 0.98 m/s  LVOT Vmean: 0.69 m/s  LVOT maxPG: 3.84 mmHg  LVOT meanP.24 mmHg  LVSI Dopp: 46.37 ml/m2  LVSV Dopp: 121.96 ml  MV A [place]: 1.02 m/s  MV Dec [place]: 2.51 m/s2  MV DecT: 309.55 ms  MV E [place]: 0.78 m/s  MV E/A Ratio: 0.76  MV PHT: 89.77 ms  MVA By PHT: 2.45 cm2    [employer] Accredited Echocardiography Laboratory    Prepared and electronically signed by    Eriberto Lopez MD  Signed 2021 15:24:46    No results found for this or any previous visit.    No results found for this or any previous visit.    No results found for this or any previous visit.

## 2024-09-02 LAB
GLUCOSE SERPL-MCNC: 142 MG/DL (ref 65–140)
GLUCOSE SERPL-MCNC: 147 MG/DL (ref 65–140)
GLUCOSE SERPL-MCNC: 155 MG/DL (ref 65–140)
GLUCOSE SERPL-MCNC: 259 MG/DL (ref 65–140)

## 2024-09-02 PROCEDURE — 99232 SBSQ HOSP IP/OBS MODERATE 35: CPT | Performed by: INTERNAL MEDICINE

## 2024-09-02 PROCEDURE — 82948 REAGENT STRIP/BLOOD GLUCOSE: CPT

## 2024-09-02 RX ADMIN — APIXABAN 5 MG: 5 TABLET, FILM COATED ORAL at 08:52

## 2024-09-02 RX ADMIN — AMIODARONE HYDROCHLORIDE 200 MG: 200 TABLET ORAL at 17:22

## 2024-09-02 RX ADMIN — METHOCARBAMOL 750 MG: 750 TABLET ORAL at 21:32

## 2024-09-02 RX ADMIN — CEFAZOLIN SODIUM 2000 MG: 2 SOLUTION INTRAVENOUS at 21:32

## 2024-09-02 RX ADMIN — CHLORHEXIDINE GLUCONATE 0.12% ORAL RINSE 15 ML: 1.2 LIQUID ORAL at 08:53

## 2024-09-02 RX ADMIN — INSULIN LISPRO 1 UNITS: 100 INJECTION, SOLUTION INTRAVENOUS; SUBCUTANEOUS at 21:40

## 2024-09-02 RX ADMIN — SENNOSIDES AND DOCUSATE SODIUM 2 TABLET: 50; 8.6 TABLET ORAL at 08:51

## 2024-09-02 RX ADMIN — POLYETHYLENE GLYCOL 3350 17 G: 17 POWDER, FOR SOLUTION ORAL at 08:51

## 2024-09-02 RX ADMIN — ACETAMINOPHEN 975 MG: 325 TABLET ORAL at 03:39

## 2024-09-02 RX ADMIN — CEFAZOLIN SODIUM 2000 MG: 2 SOLUTION INTRAVENOUS at 13:12

## 2024-09-02 RX ADMIN — METHOCARBAMOL 750 MG: 750 TABLET ORAL at 03:39

## 2024-09-02 RX ADMIN — METOPROLOL TARTRATE 25 MG: 25 TABLET, FILM COATED ORAL at 08:52

## 2024-09-02 RX ADMIN — ACETAMINOPHEN 975 MG: 325 TABLET ORAL at 17:22

## 2024-09-02 RX ADMIN — SENNOSIDES AND DOCUSATE SODIUM 2 TABLET: 50; 8.6 TABLET ORAL at 17:22

## 2024-09-02 RX ADMIN — CHLORHEXIDINE GLUCONATE 0.12% ORAL RINSE 15 ML: 1.2 LIQUID ORAL at 21:32

## 2024-09-02 RX ADMIN — ACETAMINOPHEN 975 MG: 325 TABLET ORAL at 21:32

## 2024-09-02 RX ADMIN — AMIODARONE HYDROCHLORIDE 200 MG: 200 TABLET ORAL at 08:56

## 2024-09-02 RX ADMIN — CEFAZOLIN SODIUM 2000 MG: 2 SOLUTION INTRAVENOUS at 05:42

## 2024-09-02 RX ADMIN — METHOCARBAMOL 750 MG: 750 TABLET ORAL at 17:22

## 2024-09-02 RX ADMIN — APIXABAN 5 MG: 5 TABLET, FILM COATED ORAL at 17:22

## 2024-09-02 RX ADMIN — METHOCARBAMOL 750 MG: 750 TABLET ORAL at 08:51

## 2024-09-02 RX ADMIN — INSULIN LISPRO 2 UNITS: 100 INJECTION, SOLUTION INTRAVENOUS; SUBCUTANEOUS at 17:25

## 2024-09-02 NOTE — PROGRESS NOTES
[facility]  Progress Note  Name: Augustus Coffman I  MRN: [id number]  Unit/Bed#: [facility] 505-01 I Date of Admission: 8/23/2024   Date of Service: 9/2/2024 I Hospital Day: 10    Assessment & Plan   * MSSA bacteremia  Assessment & Plan  Patient with prior admission with MSSA bacteremia on 7/28 treated with 7 days of antibiotics  Etiology likely 2/2 right foot ulcer  Complicated by cervical discitis  TTE without evidence of endocarditis     Plan:  ID consulted, plan for ancef x 6 weeks  8/23 Blood cultures: no growth to date  MRSA swab negative   MRI - Discitis osteomyelitis again demonstrated at C5-C6, repeat MRI C spine is negative for meningeal enhancement  Pericardial drain deferred by interventional cardiology  LUCRECIA deferred by cardiology, - will obtain in the future when c collar is discontinued  Check limited TTE in 1 month    Acute osteomyelitis of cervical spine (HCC)  Assessment & Plan  8/20 MRI C-Spine: Imaging findings suspicious for discitis osteomyelitis at C5-C6. 3 mm epidural phlegmon/small abscess is also suspected. There is moderate resultant central stenosis and mild mass effect on the cord  8/27 MRI - Discitis osteomyelitis again demonstrated at C5-C6   Neurosurgery consulted   Upright xrays completed  Recommending wearing of C-collar which is currently in place  Recommending 6 weeks IV antibiotics and follow-up repeat imaging  Monitor neurologic checks  8/29 MRI cervical spine with stable findings and no meningeal enhancement    Acute pericarditis  Assessment & Plan  8/22 Echo: Limited echo for assessment of endocarditis.  The patient's aortic valve is difficult to assess due to calcification but there is no new significant regurgitation.  The mitral valve has hyperechoic thickening at the tips consistent with most likely calcification, these were seen on the echocardiograms in July.  The tricuspid valve similarly was not well-visualized but no new regurgitation.   The pulmonary valve was not well-visualized. Off note patient has new moderate pericardial effusion without specific echocardiographic signs of tamponade  Patient with symptoms consistent with pericarditis  8/27 TTE - EF 50%, mod focal calcification mitral valve, moderate circumferential pericardial effusion, fluid with fibrinous appearance, no sign of tamponade         Plan:  Cardiology consulted, no plans for drainage   Completed Colchicine and Prednisone on 8/27  Close hemodynamic monitoring  Outpatient follow up with cardiology for monitoring of effusion    Type 2 diabetes mellitus (HCC)  Assessment & Plan  Lab Results   Component Value Date    HGBA1C 6.8 (H) 07/05/2024       Recent Labs     08/30/24  1539 08/30/24  2100 08/31/24  0556 08/31/24  1047   POCGLU 186* 138 166* 176*         Blood Sugar Average: Last 72 hrs:  (P) 155.2867088741767615  Home regimen: metformin  Continue diabetic diet and sliding scale coverage  Goal blood glucose 140-180      FINA (acute kidney injury) on CKD stage 2(HCC)  Assessment & Plan  Baseline Cr 1-1.2  Developed FINA 8/23 with associated hyperkalemia     Plan:  Nephrology following, appreciate their recommendations  Villa removed  Avoid nephrotoxins  Strict I/Os  Trend renal function - improved  Outpatient follow up with nephrology      New onset a-fib (HCC)  Assessment & Plan  Developed new onset afib 8/21/8/22     Plan:  Amiodarone infusion running, goal to load to 8-10g prior to discontinuation  Amiodarone 200 mg PO BID  Metoprolol 25 mg BID -can uptitrate as BP tolerates   Eliquis for AC  Telemetry monitoring     History of hypertension  Assessment & Plan  BP is acceptable, continue current regimen    Constipation  Assessment & Plan  Continue bowel regimen  monitor    Diabetic ulcer of right foot (HCC)  Assessment & Plan  Lab Results   Component Value Date    HGBA1C 6.8 (H) 07/05/2024       Recent Labs     08/30/24  1539 08/30/24  2100 08/31/24  0556 08/31/24  1047   POCGLU  186* 138 166* 176*         Blood Sugar Average: Last 72 hrs:  (P) 155.8324918156734912  Follows as an outpatient with wound care  Likely source of MSSA bacteremia   Continue local wound care  Podiatry consulted  WBAT    Transaminitis  Assessment & Plan  Likely reactive to his infection  Improving gradually  Monitor LFTs periodically until resolved             VTE Pharmacologic Prophylaxis: VTE Score: 3 Moderate Risk (Score 3-4) - Pharmacological DVT Prophylaxis Ordered: apixaban (Eliquis).    Mobility:   Basic Mobility Inpatient Raw Score: 17  JH-HLM Goal: 5: Stand one or more mins  JH-HLM Achieved: 4: Move to chair/commode  JH-HLM Goal achieved. Continue to encourage appropriate mobility.    Patient Centered Rounds: I performed bedside rounds with nursing staff today.   Discussions with Specialists or Other Care Team Provider: nurseKARINA    Total Time Spent on Date of Encounter in care of patient: 40 mins. This time was spent on one or more of the following: performing physical exam; counseling and coordination of care; obtaining or reviewing history; documenting in the medical record; reviewing/ordering tests, medications or procedures; communicating with other healthcare professionals and discussing with patient's family/caregivers.    Current Length of Stay: 10 day(s)  Current Patient Status: Inpatient   Certification Statement: The patient will continue to require additional inpatient hospital stay due to amiodarone loading  Discharge Plan: Anticipate discharge in 48 hrs to rehab facility.    Code Status: Level 1 - Full Code    Subjective:   Denies any new complaints. Has neck pain. Wants to know if he can remove the cervical collar to shave his face    Objective:     Vitals:   Temp (24hrs), Av.4 °F (36.9 °C), Min:98.1 °F (36.7 °C), Max:98.6 °F (37 °C)    Temp:  [98.1 °F (36.7 °C)-98.6 °F (37 °C)] 98.5 °F (36.9 °C)  HR:  [82-88] 88  Resp:  [16-17] 16  BP: (107-117)/(66-78) 107/67  SpO2:  [95 %-97 %] 95  %  Body mass index is 30.7 kg/m².     Input and Output Summary (last 24 hours):     Intake/Output Summary (Last 24 hours) at 9/2/2024 1245  Last data filed at 9/2/2024 0900  Gross per 24 hour   Intake 830 ml   Output --   Net 830 ml       Physical Exam:   Physical Exam  Constitutional:       Appearance: Normal appearance.   HENT:      Head: Normocephalic and atraumatic.      Nose: Nose normal.   Eyes:      Extraocular Movements: Extraocular movements intact.   Neck:      Comments: C-collar in place  Cardiovascular:      Rate and Rhythm: Normal rate. Rhythm irregular.   Pulmonary:      Effort: Pulmonary effort is normal.   Skin:     General: Skin is warm and dry.   Neurological:      Mental Status: He is alert and oriented to person, place, and time.   Psychiatric:         Mood and Affect: Mood normal.         Behavior: Behavior normal.          Additional Data:     Labs:  Results from last 7 days   Lab Units 08/29/24  0414 08/28/24  0500 08/27/24  0500   WBC Thousand/uL 15.12*   < > 11.51*   HEMOGLOBIN g/dL 11.5*   < > 10.9*   HEMATOCRIT % 38.1   < > 34.6*   PLATELETS Thousands/uL 415*   < > 432*   LYMPHO PCT %  --   --  8*   MONO PCT %  --   --  7   EOS PCT %  --   --  0    < > = values in this interval not displayed.     Results from last 7 days   Lab Units 09/01/24  0636 08/29/24  0414 08/28/24  0000   SODIUM mmol/L 138   < > 138   POTASSIUM mmol/L 4.5   < > 3.5   CHLORIDE mmol/L 99   < > 93*   CO2 mmol/L 31   < > 32   BUN mg/dL 48*   < > 87*   CREATININE mg/dL 1.09   < > 1.83*   ANION GAP mmol/L 8   < > 13   CALCIUM mg/dL 9.5   < > 9.5   ALBUMIN g/dL  --   --  3.5   TOTAL BILIRUBIN mg/dL  --   --  0.51   ALK PHOS U/L  --   --  218*   ALT U/L  --   --  7   AST U/L  --   --  99*   GLUCOSE RANDOM mg/dL 139   < > 137    < > = values in this interval not displayed.           Results from last 7 days   Lab Units 09/02/24  1031 09/02/24  0603 09/01/24  2039 09/01/24  1554 09/01/24  1037 09/01/24  0614 08/31/24 2035  08/31/24  1604 08/31/24  1047 08/31/24  0556 08/30/24  2100 08/30/24  1539   POC GLUCOSE mg/dl 142* 147* 123 223* 190* 141* 180* 163* 176* 166* 138 186*                 Lines/Drains:  Invasive Devices       Central Venous Catheter Line  Duration             [facility] Central Lines 08/29/24 Right Other (Comment) 4 days                    Central Line:  Goal for removal: Will discontinue when meds requiring line are completed.             Imaging: Reviewed radiology reports from this admission including: MRI spine    Recent Cultures (last 7 days):           Last 24 Hours Medication List:   Current Facility-Administered Medications   Medication Dose Route Frequency Provider Last Rate    acetaminophen  975 mg Oral Q6H SRIDHAR Ophelia Leon PA-C      amiodarone  200 mg Oral BID With Meals Ophelia Leon PA-C      apixaban  5 mg Oral BID Ophelia Leon PA-C      calcium carbonate  1,000 mg Oral Daily PRN Ophelia Leon PA-C      cefazolin  2,000 mg Intravenous Q8H Tom Andre MD Stopped (09/02/24 0801)    chlorhexidine  15 mL Mouth/Throat Q12H SRIDHAR Ophelia Leon PA-C      insulin lispro  1-5 Units Subcutaneous TID AC Ophelia Leon PA-C      insulin lispro  1-5 Units Subcutaneous HS Ophelia Leon PA-C      methocarbamol  750 mg Oral Q6H SRIDHAR Ophelia Leon PA-C      metoprolol tartrate  25 mg Oral Q12H SRIDHAR Ophelia Leon PA-C      ondansetron  4 mg Intravenous Q6H PRN Ophelia Leon PA-C      oxyCODONE  10 mg Oral Q4H PRN Ophelia Leon PA-C      oxyCODONE  5 mg Oral Q4H PRN Ophelia Leon PA-C      polyethylene glycol  17 g Oral Daily Ophelia Leon PA-C      senna-docusate sodium  2 tablet Oral BID Ophelia Leon PA-C          Today, Patient Was Seen By: Tom Andre MD    **Please Note: This note may have been constructed using a voice recognition system.**

## 2024-09-02 NOTE — CASE MANAGEMENT
Case Management Discharge Planning Note    Patient name Augustus Coffman  Location [facility] 505/[facility] 505-01 MRN [id number]  : 1955 Date 2024       Current Admission Date: 2024  Current Admission Diagnosis:MSSA bacteremia   Patient Active Problem List    Diagnosis Date Noted Date Diagnosed    Constipation 2024     Acute pericarditis 2024     Diabetic ulcer of right foot (HCC) 2024     MSSA bacteremia 2024     New onset a-fib (formerly [facility]) 2024     Acute osteomyelitis of cervical spine (formerly [facility]) 2024     Transaminitis 2024     Acute renal failure with oliguria  (formerly [facility]) 2024     Neck pain 2024     Sepsis without acute organ dysfunction (formerly [facility]) 2024     Acute respiratory failure with hypoxia (formerly [facility]) 2024     Acute hyponatremia 2024     Chronic diastolic CHF (congestive heart failure) (formerly [facility]) 2024     Encounter for deep vein thrombosis (DVT) prophylaxis 2024     Hyponatremia 2024     Aortic stenosis with trileaflet valve 07/15/2024     Ectatic thoracic aorta (formerly [facility]) 2024     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 (formerly [facility]) 10/09/2023     Vitamin D deficiency 2023     Stage 3a chronic kidney disease (formerly [facility]) 2023     Follicular lymphoma grade I of lymph nodes of multiple sites (formerly [facility]) 08/15/2022     Obesity, morbid (formerly [facility]) 2022     Other proteinuria 2022     Stage 2 chronic kidney disease 09/15/2021     Hypertensive kidney disease with stage 2 chronic kidney disease 09/15/2021     Rash 2021     Chronic venous hypertension (idiopathic) with ulcer of right lower extremity (formerly [facility]) 2021     GI bleed 2021     Pleural effusion 2021     Heart murmur 2021     Retroperitoneal hematoma 2021     Mesenteric lymphadenopathy 2021     Acute blood loss anemia 2021     FINA (acute kidney injury) on CKD stage 2(HCC) 2021     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HCC) 2021     Stasis dermatitis of  both legs 04/17/2019     Hammer toe of right foot 02/19/2019     Obesity with body mass index 30 or greater 10/31/2016     Diabetes 1.5, 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    [id number] - [address] [address]  Phone: [phone] Fax: [phone]    Primary Care Provider: Gwen Lazo DO    Primary Insurance: MEDICARE  Secondary Insurance: [facility]    DISCHARGE DETAILS:                  Other Referral/Resources/Interventions Provided:  Referral Comments: Notified by provider - stable for dc when bed available at [facility].  Message sent in [facility] to [facility] notifying of same - requesting bed availability. Pt on RA and amio drip switched to PO.  Awaiting response from [facility].          1136 am: Response received from [facility] - anticipate bed available tomorrow 9/3.

## 2024-09-03 ENCOUNTER — TELEPHONE (OUTPATIENT)
Dept: NEPHROLOGY | Facility: CLINIC | Age: 69
End: 2024-09-03

## 2024-09-03 ENCOUNTER — HOSPITAL ENCOUNTER (INPATIENT)
Facility: HOSPITAL | Age: 69
LOS: 6 days | Discharge: HOME HEALTH | DRG: 949 | End: 2024-09-09
Attending: FAMILY MEDICINE | Admitting: FAMILY MEDICINE
Payer: MEDICARE

## 2024-09-03 DIAGNOSIS — K92.2 GI BLEED: ICD-10-CM

## 2024-09-03 DIAGNOSIS — M46.22 ACUTE OSTEOMYELITIS OF CERVICAL SPINE (HCC): ICD-10-CM

## 2024-09-03 DIAGNOSIS — I48.91 NEW ONSET A-FIB (HCC): ICD-10-CM

## 2024-09-03 DIAGNOSIS — B95.61 MSSA BACTEREMIA: ICD-10-CM

## 2024-09-03 DIAGNOSIS — R78.81 MSSA BACTEREMIA: ICD-10-CM

## 2024-09-03 DIAGNOSIS — M54.2 NECK PAIN: ICD-10-CM

## 2024-09-03 DIAGNOSIS — M10.9 GOUT: Primary | ICD-10-CM

## 2024-09-03 DIAGNOSIS — N18.31 STAGE 3A CHRONIC KIDNEY DISEASE (HCC): Primary | ICD-10-CM

## 2024-09-03 DIAGNOSIS — N18.31 STAGE 3A CHRONIC KIDNEY DISEASE (HCC): ICD-10-CM

## 2024-09-03 LAB
GLUCOSE SERPL-MCNC: 131 MG/DL (ref 65–140)
GLUCOSE SERPL-MCNC: 138 MG/DL (ref 65–140)
GLUCOSE SERPL-MCNC: 141 MG/DL (ref 65–140)
GLUCOSE SERPL-MCNC: 144 MG/DL (ref 65–140)

## 2024-09-03 PROCEDURE — 99223 1ST HOSP IP/OBS HIGH 75: CPT | Performed by: PHYSICAL MEDICINE & REHABILITATION

## 2024-09-03 PROCEDURE — 99233 SBSQ HOSP IP/OBS HIGH 50: CPT | Performed by: INTERNAL MEDICINE

## 2024-09-03 PROCEDURE — 99232 SBSQ HOSP IP/OBS MODERATE 35: CPT | Performed by: PODIATRIST

## 2024-09-03 PROCEDURE — 82948 REAGENT STRIP/BLOOD GLUCOSE: CPT

## 2024-09-03 PROCEDURE — 99239 HOSP IP/OBS DSCHRG MGMT >30: CPT | Performed by: FAMILY MEDICINE

## 2024-09-03 PROCEDURE — 99223 1ST HOSP IP/OBS HIGH 75: CPT | Performed by: PHYSICIAN ASSISTANT

## 2024-09-03 PROCEDURE — NC001 PR NO CHARGE: Performed by: PHYSICAL MEDICINE & REHABILITATION

## 2024-09-03 RX ORDER — SENNOSIDES 8.6 MG
1 TABLET ORAL
Status: DISCONTINUED | OUTPATIENT
Start: 2024-09-03 | End: 2024-09-04

## 2024-09-03 RX ORDER — OXYCODONE HYDROCHLORIDE 10 MG/1
10 TABLET ORAL EVERY 4 HOURS PRN
Status: DISCONTINUED | OUTPATIENT
Start: 2024-09-03 | End: 2024-09-03

## 2024-09-03 RX ORDER — INSULIN LISPRO 100 [IU]/ML
1-6 INJECTION, SOLUTION INTRAVENOUS; SUBCUTANEOUS
Status: DISCONTINUED | OUTPATIENT
Start: 2024-09-03 | End: 2024-09-06

## 2024-09-03 RX ORDER — AMIODARONE HYDROCHLORIDE 200 MG/1
200 TABLET ORAL DAILY
Status: DISCONTINUED | OUTPATIENT
Start: 2024-09-04 | End: 2024-09-09 | Stop reason: HOSPADM

## 2024-09-03 RX ORDER — POLYETHYLENE GLYCOL 3350 17 G/17G
17 POWDER, FOR SOLUTION ORAL DAILY
Start: 2024-09-04

## 2024-09-03 RX ORDER — OXYCODONE HYDROCHLORIDE 10 MG/1
10 TABLET ORAL EVERY 4 HOURS PRN
Status: SHIPPED | OUTPATIENT
Start: 2024-09-03 | End: 2024-09-09

## 2024-09-03 RX ORDER — ACETAMINOPHEN 325 MG/1
975 TABLET ORAL EVERY 6 HOURS SCHEDULED
Status: DISCONTINUED | OUTPATIENT
Start: 2024-09-03 | End: 2024-09-03

## 2024-09-03 RX ORDER — ACETAMINOPHEN 325 MG/1
975 TABLET ORAL EVERY 6 HOURS SCHEDULED
Start: 2024-09-03 | End: 2024-09-09

## 2024-09-03 RX ORDER — OXYCODONE HYDROCHLORIDE 5 MG/1
5 TABLET ORAL EVERY 4 HOURS PRN
Status: DISCONTINUED | OUTPATIENT
Start: 2024-09-03 | End: 2024-09-03

## 2024-09-03 RX ORDER — ATORVASTATIN CALCIUM 80 MG/1
80 TABLET, FILM COATED ORAL
Status: DISCONTINUED | OUTPATIENT
Start: 2024-09-03 | End: 2024-09-09 | Stop reason: HOSPADM

## 2024-09-03 RX ORDER — METHOCARBAMOL 750 MG/1
750 TABLET, FILM COATED ORAL EVERY 6 HOURS SCHEDULED
Start: 2024-09-03 | End: 2024-09-09

## 2024-09-03 RX ORDER — AMIODARONE HYDROCHLORIDE 200 MG/1
200 TABLET ORAL 2 TIMES DAILY WITH MEALS
Start: 2024-09-03 | End: 2024-09-03

## 2024-09-03 RX ORDER — METOPROLOL TARTRATE 25 MG/1
25 TABLET, FILM COATED ORAL EVERY 12 HOURS SCHEDULED
Status: ON HOLD
Start: 2024-09-03 | End: 2024-09-08

## 2024-09-03 RX ORDER — OXYCODONE HYDROCHLORIDE 5 MG/1
5 TABLET ORAL EVERY 4 HOURS PRN
Status: DISCONTINUED | OUTPATIENT
Start: 2024-09-03 | End: 2024-09-05

## 2024-09-03 RX ORDER — POLYETHYLENE GLYCOL 3350 17 G/17G
17 POWDER, FOR SOLUTION ORAL DAILY
Status: DISCONTINUED | OUTPATIENT
Start: 2024-09-04 | End: 2024-09-09 | Stop reason: HOSPADM

## 2024-09-03 RX ORDER — ONDANSETRON 4 MG/1
4 TABLET, ORALLY DISINTEGRATING ORAL EVERY 6 HOURS PRN
Status: DISCONTINUED | OUTPATIENT
Start: 2024-09-03 | End: 2024-09-09

## 2024-09-03 RX ORDER — AMOXICILLIN 250 MG
2 CAPSULE ORAL
Status: DISCONTINUED | OUTPATIENT
Start: 2024-09-03 | End: 2024-09-03

## 2024-09-03 RX ORDER — AMIODARONE HYDROCHLORIDE 200 MG/1
200 TABLET ORAL DAILY
Start: 2024-09-03 | End: 2024-09-09

## 2024-09-03 RX ORDER — AMOXICILLIN 250 MG
2 CAPSULE ORAL 2 TIMES DAILY
Start: 2024-09-03 | End: 2024-09-09

## 2024-09-03 RX ORDER — METHOCARBAMOL 750 MG/1
750 TABLET, FILM COATED ORAL EVERY 6 HOURS SCHEDULED
Status: DISCONTINUED | OUTPATIENT
Start: 2024-09-03 | End: 2024-09-06

## 2024-09-03 RX ORDER — DOCUSATE SODIUM 100 MG/1
100 CAPSULE, LIQUID FILLED ORAL 2 TIMES DAILY
Status: DISCONTINUED | OUTPATIENT
Start: 2024-09-03 | End: 2024-09-09 | Stop reason: HOSPADM

## 2024-09-03 RX ORDER — AMOXICILLIN 250 MG
2 CAPSULE ORAL 2 TIMES DAILY
Status: DISCONTINUED | OUTPATIENT
Start: 2024-09-03 | End: 2024-09-03

## 2024-09-03 RX ORDER — OXYCODONE HYDROCHLORIDE 5 MG/1
5 TABLET ORAL EVERY 4 HOURS PRN
Status: SHIPPED | OUTPATIENT
Start: 2024-09-03 | End: 2024-09-09

## 2024-09-03 RX ORDER — ACETAMINOPHEN 325 MG/1
975 TABLET ORAL EVERY 8 HOURS PRN
Status: DISCONTINUED | OUTPATIENT
Start: 2024-09-03 | End: 2024-09-09

## 2024-09-03 RX ORDER — CEFAZOLIN SODIUM 2 G/50ML
2000 SOLUTION INTRAVENOUS EVERY 8 HOURS
Status: DISCONTINUED | OUTPATIENT
Start: 2024-09-03 | End: 2024-09-09 | Stop reason: HOSPADM

## 2024-09-03 RX ORDER — INSULIN LISPRO 100 [IU]/ML
1-5 INJECTION, SOLUTION INTRAVENOUS; SUBCUTANEOUS
Status: DISCONTINUED | OUTPATIENT
Start: 2024-09-03 | End: 2024-09-06

## 2024-09-03 RX ORDER — METOPROLOL TARTRATE 25 MG/1
25 TABLET, FILM COATED ORAL EVERY 12 HOURS SCHEDULED
Status: DISCONTINUED | OUTPATIENT
Start: 2024-09-03 | End: 2024-09-09 | Stop reason: HOSPADM

## 2024-09-03 RX ORDER — CEFAZOLIN SODIUM 2 G/50ML
2000 SOLUTION INTRAVENOUS EVERY 8 HOURS
Start: 2024-09-03 | End: 2024-10-03

## 2024-09-03 RX ADMIN — CEFAZOLIN SODIUM 2000 MG: 2 SOLUTION INTRAVENOUS at 05:36

## 2024-09-03 RX ADMIN — CEFAZOLIN SODIUM 2000 MG: 2 SOLUTION INTRAVENOUS at 13:24

## 2024-09-03 RX ADMIN — ATORVASTATIN CALCIUM 80 MG: 80 TABLET, FILM COATED ORAL at 16:24

## 2024-09-03 RX ADMIN — APIXABAN 5 MG: 5 TABLET, FILM COATED ORAL at 08:37

## 2024-09-03 RX ADMIN — Medication 4000 UNITS: at 16:24

## 2024-09-03 RX ADMIN — CHLORHEXIDINE GLUCONATE 0.12% ORAL RINSE 15 ML: 1.2 LIQUID ORAL at 08:37

## 2024-09-03 RX ADMIN — ACETAMINOPHEN 975 MG: 325 TABLET ORAL at 17:24

## 2024-09-03 RX ADMIN — METHOCARBAMOL 750 MG: 750 TABLET ORAL at 03:25

## 2024-09-03 RX ADMIN — APIXABAN 5 MG: 5 TABLET, FILM COATED ORAL at 17:25

## 2024-09-03 RX ADMIN — ACETAMINOPHEN 975 MG: 325 TABLET ORAL at 09:38

## 2024-09-03 RX ADMIN — DOCUSATE SODIUM 100 MG: 100 CAPSULE, LIQUID FILLED ORAL at 17:25

## 2024-09-03 RX ADMIN — B-COMPLEX W/ C & FOLIC ACID TAB 1 TABLET: TAB at 16:25

## 2024-09-03 RX ADMIN — CEFAZOLIN SODIUM 2000 MG: 2 SOLUTION INTRAVENOUS at 20:54

## 2024-09-03 RX ADMIN — ACETAMINOPHEN 975 MG: 325 TABLET ORAL at 03:24

## 2024-09-03 RX ADMIN — METFORMIN HYDROCHLORIDE 1000 MG: 500 TABLET ORAL at 16:25

## 2024-09-03 RX ADMIN — METHOCARBAMOL 750 MG: 750 TABLET ORAL at 23:00

## 2024-09-03 RX ADMIN — METHOCARBAMOL 750 MG: 750 TABLET ORAL at 08:37

## 2024-09-03 RX ADMIN — METHOCARBAMOL 750 MG: 750 TABLET ORAL at 17:25

## 2024-09-03 RX ADMIN — METOPROLOL TARTRATE 25 MG: 25 TABLET, FILM COATED ORAL at 08:37

## 2024-09-03 RX ADMIN — AMIODARONE HYDROCHLORIDE 200 MG: 200 TABLET ORAL at 08:37

## 2024-09-03 RX ADMIN — METHOCARBAMOL 750 MG: 750 TABLET ORAL at 14:11

## 2024-09-03 NOTE — ASSESSMENT & PLAN NOTE
Previously hospitalized for MSSA bacteremia on 7/28, thought to be from heel ulcer and completed a course of antibiotics and was discharged home  Per chart, he was complaining of neck pain during that admission  He was sent to ortho outpatient and had an MRI of his neck that reported osteo and discitis so he was sent to the ED for admission  He was admitted on 8/20 and was seen by ID  LUCRECIA showed no endocarditis  MRI showed discitis/osteo at C5-C6  ID recommending 6 weeks of IV ancef    Needs repeat limited LUCRECIA when c collar is discontinued  Needs CBC and CMP weekly while on antibiotics  Has PICC which can be discontinued when ancef complete  Follow up with ID

## 2024-09-03 NOTE — ASSESSMENT & PLAN NOTE
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 3 mm epidural phlegmon/small abscess is also suspected. There is moderate resultant central stenosis and mild mass effect on the cord  8/25 XR C-Spine Destruction of the C5 to space with collapse of the superior endplate of C6 consistent with discitis osteomyelitis which has progressed from the prior studies. Increased swelling anterior to C5-6.   8/27 MRI - Discitis osteomyelitis again demonstrated at C5-C6   8/29 MRI cervical spine with stable findings and no meningeal enhancement    Continue c-collar per NSGY  Recommending 6 weeks IV antibiotics as above and follow-up repeat upright x-rays ~9/25  Monitor neurologic checks

## 2024-09-03 NOTE — ASSESSMENT & PLAN NOTE
Seen on incidental CT  Follows with cardiology  No weightbearing above 5 pounds in upper extremities per his outpatient doctor

## 2024-09-03 NOTE — PROGRESS NOTES
Progress Note - Infectious Disease   Augustus Coffman 68 y.o. male MRN: [id number]  Unit/Bed#: [facility] 505-01 Encounter: [id number]      Impression/Plan:  1.  Shock.  Patient post transfer developed A-fib with RVR and also hypotension requiring pressors.  White count elevated but this may be related to recent steroid.  Blood cultures have cleared.  Unlikely infectious in etiology and pressors have been weaned off.  Pan CT imaging without contrast without new acute findings.  MRI T and L-spine unremarkable.  Repeat echo showing smaller/organized fluid.  MRI C-spine without meningeal disease.  Continue cefazolin 2 g IV every 8 hours  Duration of therapy and follow-up imaging as below  Check CBC with differential and BMP to make sure no developing toxicities  Further cardiac imaging as outpatient with cardiology  Maintain current tunneled central venous catheter  Supportive care     2.  MSSA bacteremia.  Blood cultures have returned positive for MSSA and now appear to be cleared.  Initial source is #7.  Course now further complicated by #3 and #4.  2D echo limited.  Concern remains for endovascular/perivalvular complications given possible prolonged bacteremia.  Would question if patient is also developing secondary autoimmune response to his infection given #4 and 5.  Unable to definitively diagnose purulent pericarditis.  Patient also too high risk for LUCRECIA now.  Overall fortunately clinically improving.  Repeat MRI C-spine without further epidural changes.  Continue Ancef 2 g every 8 hours through 10/2/2024 to complete 6 weeks of treatment  Weekly labs with CBCD, creatinine, ESR and CRP on antibiotic  Plan for repeat MRI C-spine with contrast in 6 to 8 weeks, for abscess resolution  Anticipate transition to oral antibiotic with Duricef on 10/3  Will likely continue oral antibiotic until ESR/CRP improve  Maintain current tunneled central line  Plan for follow-up in ID office 2 weeks after discharge  Tentative plans for  discharge to rehab  ID office staff notified of the above follow-up needs  Further cardiac imaging and follow-up with cardiology as outpatient     3.  C5-C6 discitis/osteomyelitis with epidural phlegmon/abscess.  Found on outpatient imaging.  Complication of #2.  Patient fortunately without any focal neurologic deficits.  No other lesions on MRI T and L-spine.  Repeat MRI C-spine now without any epidural phlegmon/abscess.  Only paravertebral abscess remains.  Neck spasms improved  Neurosurgical evaluation noted, follow-up outpatient  Repeat MR imaging as outpatient  Antibiotics as above  Recheck sedimentation rate and CRP weekly  Monitor neuroexam  Additional care as above     4.  Acute pericarditis with new pericardial effusion.  Patient developed abrupt onset of chest pain with ST elevations.  Troponins negative.  Clinical picture seems consistent with acute pericarditis.  Uncertain however if he has a purulent pericarditis but this would be unlikely based upon the clinical presentation and time course.    Repeat cardiac imaging as outpatient with cardiology  Antibiotics as above  Trend fever curve/WBC  Thoracic surgery follow-up     5.  Acute kidney injury on chronic kidney disease.  Acute elevation in creatinine noted from baseline of 1-1.2.  Likely due to acute illness, shock and also consider postinfectious autoimmune process.  Creatinine improving.  Nephrology follow-up  Full dose IV antibiotics  Recheck BMP to make sure no worsening  Volume management     6.  Acute transaminitis.  Acute elevation in LFTs noted likely due to the above.  Right upper quadrant ultrasound unremarkable.  Improving.   Antibiotics otherwise as above  Recheck LFTs     7.  Chronic right foot ulcer with hardware present.  Patient has had a chronic ulcer present for some time.  Imaging reviewed and there is hardware present.  Podiatry evaluation noted and there is no concern for deeper tracking hardware infection and MRI of limited  utility on discussion.  Patient denies the wound ever tracking to his hardware.  Podiatry evaluation appreciated  Continue antibiotics as above  Will continue local wound care  Hold off on further imaging for now, if stable  Trend fever curve/WBC     8.  Type 2 diabetes and obesity.  Hemoglobin A1c of 6.8.  BMI of 34.  The latter can impact antibiotic dosing.  Will continue current antibiotic dosing as above pending further imaging and favor higher dosing when possible.  Glucose management per primary    Discussed with the primary service the plan to continue the intravenous cefazolin and they agree with the plan    Antibiotics:  Cefazolin 15    Subjective:  Patient has no fever, chills, sweats; no nausea, vomiting, diarrhea; no cough, shortness of breath; no increased pain. No new symptoms.  Overall he is feeling much better.    Objective:  Vitals:  Temp:  [97.4 °F (36.3 °C)-98.5 °F (36.9 °C)] 97.4 °F (36.3 °C)  HR:  [88-92] 92  Resp:  [15-22] 22  BP: (107-111)/(67-71) 111/71  SpO2:  [93 %-99 %] 93 %  Temp (24hrs), Av.9 °F (36.6 °C), Min:97.4 °F (36.3 °C), Max:98.5 °F (36.9 °C)  Current: Temperature: (!) 97.4 °F (36.3 °C)    Physical Exam:   General Appearance:  Alert, interactive, nontoxic, no acute distress.  [place] collar in place   Throat: Oropharynx moist without lesions.    Lungs:   Clear to auscultation bilaterally; no wheezes, rhonchi or rales; respirations unlabored   Heart:  RRR; no murmur, rub or gallop   Abdomen:   Soft, non-tender, non-distended, positive bowel sounds.     Extremities: Right foot dressing in place.   Skin: No new rashes or lesions. No draining wounds noted.       Labs, Imaging, & Other studies:   All pertinent labs and imaging studies were personally reviewed  Results from last 7 days   Lab Units 24  0414 24  0500   WBC Thousand/uL 15.12* 12.78*   HEMOGLOBIN g/dL 11.5* 11.4*   PLATELETS Thousands/uL 415* 450*     Results from last 7 days   Lab Units 24  0636  08/31/24  0407 08/30/24  0539 08/29/24  0414 08/28/24  0000   SODIUM mmol/L 138 137 138   < > 138   POTASSIUM mmol/L 4.5 4.4 4.0   < > 3.5   CHLORIDE mmol/L 99 94* 94*   < > 93*   CO2 mmol/L 31 33* 32   < > 32   BUN mg/dL 48* 65* 79*   < > 87*   CREATININE mg/dL 1.09 1.21 1.40*   < > 1.83*   EGFR ml/min/1.73sq m 69 61 51   < > 37   CALCIUM mg/dL 9.5 9.7 9.8   < > 9.5   AST U/L  --   --   --   --  99*   ALT U/L  --   --   --   --  7   ALK PHOS U/L  --   --   --   --  218*    < > = values in this interval not displayed.

## 2024-09-03 NOTE — ASSESSMENT & PLAN NOTE
New onset POA with flutter and fibrillation 2:1 AV conduction  Amio gtt discontinued 8/31  CHADS-VASC score 3    Continue eliquis, lopressor, and amiodarone  Adjust rate control meds as able  Amiodarone to turn into 200 mg daily on 9/6 per cardiology recs

## 2024-09-03 NOTE — ASSESSMENT & PLAN NOTE
Lab Results   Component Value Date    HGBA1C 6.8 (H) 07/05/2024       Recent Labs     09/04/24  1632 09/04/24 2053 09/05/24  0625 09/05/24  1055   POCGLU 111 148* 137 108       Blood Sugar Average: Last 72 hrs:  (P) 129.5    Skin Care Plan:  1-Right Foot Wound: Per Podiatry recommendations   2-Turn/reposition q2h or when medically stable for pressure re-distribution on skin .  3-Elevate heels to offload pressure.  4-Moisturize skin daily with skin nourishing cream  5-Ehob cushion in chair when out of bed.  6-Preventative Hydraguard to bilateral sacro-buttocks and heels BID and PRN. ]    Podiatry recommended Dermagran to area daily and PRN, wound care order placed

## 2024-09-03 NOTE — ASSESSMENT & PLAN NOTE
FINA during hospitalization, creat peaked at 3.38. now down to 1.09  Was seen by nephrology  Had avilez which was subsequently removed  Avoid nephrotoxins  Monitor labs  Follow up with nephrology after discharge

## 2024-09-03 NOTE — ASSESSMENT & PLAN NOTE
MSSA bacteremia  Patient with prior admission with MSSA bacteremia on 7/28 treated with 7 days of antibiotics  Etiology likely 2/2 right foot ulcer  TTE without evidence of endocarditis  Cleared Blood cultures 8/23    ID consulted, plan for ancef x 6 weeks (end date 10/2 through PICC)   - WBC 15.1 --> 10.0, Hgb 11.5 --> 9.0, platelets 415 --> 262. Last check was on 8/29. Rechecked since it was drawn from PICC, but repeat values similar. Has been receiving IV antibiotics q8h so may be dilutional, but also concern for antibiotic effect on bone marrow with developing pancytopenia. Will discuss with ID  - stable on 9/9  Anticipate transition to oral antibiotic with Duricef on 10/3, to be continued until ESR/CRP improve  LUCRECIA deferred by cardiology, - will obtain in the future when c collar is discontinued  Check limited TTE in 1 month  PT/OT for 3-5 hours a day for 5-6 days/week

## 2024-09-03 NOTE — CONSULTS
PHYSICAL MEDICINE AND REHABILITATION [facility] REHAB CONSULT  Augustus Coffman 68 y.o. male MRN: [id number]  Unit/Bed#: [facility] 973-01 Encounter: [id number]     Rehab Diagnosis: Impairment of mobility, safety and Activities of Daily Living (ADLs) due to Other Disabling Impairments:  13  Other Disabling Impairments  Etiologic Diagnosis: MSSA bacteremia with cervical discitis and osteomyelitis    History of Present Illness:   Augustus Coffman is a 68 y.o. male patient who originally presented to the hospital on 8/23/2024 as a transfer from [facility].  This is a 68-year-old male with past medical history significant for type 2 diabetes mellitus, diabetic foot wound, hypertension who presented to the [facility] ED on 8/20 after an outpatient MRI revealed osteomyelitis and discitis.  Patient had neck pain for the past 6 to 7 weeks with no associated weakness.  Patient was recently admitted to the hospital with MSSA bacteremia with source of right foot diabetic infection.  NSGY deferred intervention. Antibiotics managed by infectious disease.  Patient had new onset of chest pain and developed atrial fibrillation on EKG with evidence of pericarditis and echocardiogram was concerning for pericardial effusion without evidence of tamponade.  Patient was transferred to [facility] to be evaluated by cardiology and also by thoracic surgery.  Patient developed A-fib with RVR and with associated hypotension and was admitted to the intensive care unit at the time of admission to [facility] campus and he was on pressors.  Echocardiogram did not show any evidence of vegetation.  Patient was evaluated by cardiology and infectious disease and continued on the antibiotics.  Patient was also evaluated by thoracic surgery.  Thoracic surgery recommended that he does not need any surgical intervention for the pericardial effusion.  There is a question that he may benefit from  pericardiocentesis since the patient was hemodynamically stable and improving cardiology decided against pericardiocentesis.  For the atrial fibrillation/flutter patient was on amiodarone drip which was subsequently changed to p.o. amiodarone load and will be discharged on amiodarone 200 mg daily.  Neurosurgery evaluated the patient.  Had MRI of the thoracic and lumbar spine which was negative for osteomyelitis or discitis.  MRI cervical spine showed stable changes. Patient also developed acute kidney injury and was evaluated by nephrology.  Creatinine peaked at 3.38.  Creatinine is down to 1.09 on 9/1 once.  Patient was hemodynamically stable he was transferred out of the intensive care unit on 8/28.  Patient required Bumex infusion while in the intensive care unit.  Patient had tunneled PICC line placed by interventional radiology on 8/29.  PMR evaluated the patient.  Patient was felt to be appropriate for acute rehab.  Admitted to [facility] 9/3    Subjective:   Pt seen this afternoon upon arrival to [facility].     Pain: Reports pain at neck that is relieved by tylenol and robaxin. His collar causes discomfort but he understands its importance    Sleep: Sleeping poorly. Using nothing before bed. Having trouble with interruptions at night but generally energetic during the day    Bowel: continent of bowel. Has been receiving miralax, senna, and docusate BID during acute hospitalization with last BM 9/2.    Bladder: continent of bladder. No dysuria, frequency, or hesitancy    Denies fever, chills, headaches, changes in vision, chest pain, shortness of breath, presyncope, abdominal pain, nausea, diarrhea, constipation, and insomnia.     Review of Systems: A 10-point review of systems was performed. Negative except as listed above.    Plan:     * MSSA bacteremia  Assessment & Plan  MSSA bacteremia  Patient with prior admission with MSSA bacteremia on 7/28 treated with 7 days of antibiotics  Etiology likely 2/2 right foot  ulcer  TTE without evidence of endocarditis  Cleared Blood cultures 8/23    ID consulted, plan for ancef x 6 weeks (end date 10/2 through PICC)  Anticipate transition to oral antibiotic with Duricef on 10/3, to be continued until ESR/CRP improve  LUCRECIA deferred by cardiology, - will obtain in the future when c collar is discontinued  Check limited TTE in 1 month  PT/OT for 3 hours a day for 6-7 days, see IPOC    Acute osteomyelitis of cervical spine (HCC)  Assessment & Plan  8/20 MRI C-Spine: Imaging findings suspicious for discitis osteomyelitis at C5-C6. 3 mm epidural phlegmon/small abscess is also suspected. There is moderate resultant central stenosis and mild mass effect on the cord  8/27 MRI - Discitis osteomyelitis again demonstrated at C5-C6   8/29 MRI cervical spine with stable findings and no meningeal enhancement    Continue c-collar per NSGY  Recommending 6 weeks IV antibiotics as above and follow-up repeat upright x-rays ~9/25  Monitor neurologic checks     Constipation  Assessment & Plan  Constipation  No BM since 8/29, but had yesterday 9/2  Will de-escalate to docusate BID and miralax daily  Miralax daily    Diabetic ulcer of right foot (HCC)  Assessment & Plan  Lab Results   Component Value Date    HGBA1C 6.8 (H) 07/05/2024       Recent Labs     09/02/24  2033 09/03/24  0641 09/03/24  1020 09/03/24  1541   POCGLU 155* 144* 131 138       Blood Sugar Average: Last 72 hrs:  (P) 138    Skin Care Plan:  1-Right Foot Wound: Per Podiatry recommendations   2-Turn/reposition q2h or when medically stable for pressure re-distribution on skin .  3-Elevate heels to offload pressure.  4-Moisturize skin daily with skin nourishing cream  5-Ehob cushion in chair when out of bed.  6-Preventative Hydraguard to bilateral sacro-buttocks and heels BID and PRN. ]    Will consult podiatry    Acute pericarditis  Assessment & Plan  Presented with classic symptoms and with diffuse ST elevation on 8/22 EKG  8/22 Echo: Limited  echo for assessment of endocarditis.  The patient's aortic valve is difficult to assess due to calcification but there is no new significant regurgitation.  The mitral valve has hyperechoic thickening at the tips consistent with most likely calcification, these were seen on the echocardiograms in July.  The tricuspid valve similarly was not well-visualized but no new regurgitation.  The pulmonary valve was not well-visualized. Off note patient has new moderate pericardial effusion without specific echocardiographic signs of tamponade  8/27 TTE - EF 50%, mod focal calcification mitral valve, moderate circumferential pericardial effusion, fluid with fibrinous appearance, no sign of tamponade      Drain deferred due to no tamponade  LUCRECIA when c-collar removed  Completed Colchicine and Prednisone on 8/27  Close hemodynamic monitoring  Outpatient follow up with cardiology for monitoring of effusion    New onset a-fib (HCC)  Assessment & Plan  New onset POA with flutter and fibrillation 2:1 AV conduction  Amio gtt discontinued 8/31  CHADS-VASC score 3    Continue eliquis, lopressor, and amiodarone  Adjust rate control meds as able  Amiodarone to turn into 200 mg daily on 9/6 per cardiology recs    Transaminitis  Assessment & Plan  Mild elevation seen earlier in hospitalization  Has been on schedule tylenol  RUQ US unremarkable for pathology  Recheck tomorrow AM    Neck pain  Assessment & Plan  See cervical osteomyelitis    Chronic diastolic CHF (congestive heart failure) (HCC)  Assessment & Plan  Wt Readings from Last 3 Encounters:   09/03/24 111 kg (245 lb 9.6 oz)   09/03/24 111 kg (244 lb 8 oz)   08/29/24 112 kg (248 lb)     See pericarditis for full cardiac plan  Continue beta blocker and atorvastatin          Ectatic thoracic aorta (HCC)  Assessment & Plan  Seen on incidental CT  Follows with cardiology  No weightbearing above 5 pounds in upper extremities per his outpatient doctor    Vitamin D deficiency  Assessment &  Plan  Continue repletion  Normal level of 47 four months ago    Heart murmur  Assessment & Plan  Known aortic valvular disease  Outpatient cardiology follow up    FINA (acute kidney injury) on CKD stage 2(HCC)  Assessment & Plan  Acute elevation in creatinine noted from baseline of 1-1.2. to ~3.4 with hyperkalemia  Likely in setting of illness, dehydration, autoimmune?  Last Cr 1.09 on 9/1, will recheck tomorrow AM    Acute blood loss anemia  Assessment & Plan  Baseline Hgb 10-12  Last value 11.5  Monitor for s/s bleeding  Check cbc tomorrow    Type 2 diabetes mellitus (HCC)  Assessment & Plan  Lab Results   Component Value Date    HGBA1C 6.8 (H) 07/05/2024       Recent Labs     09/02/24  1546 09/02/24  2033 09/03/24  0641 09/03/24  1020   POCGLU 259* 155* 144* 131       Blood Sugar Average: Last 72 hrs:    On jardiance & metformin outpatient, held during FINA  Complicated by diabetic ulcer as noted to be likely source as above  Continue SSI per IM  Consider adding back orals given improved kidney function to baseline    Hypercholesteremia  Assessment & Plan  Continue statin    History of hypertension  Assessment & Plan  Home: amlodipine 10 mg, losartan 100 mg, Toprol 100 daily  Here: lopressor 25 BID, normotensive  Add back on home meds as needed with FINA resolved        Health Maintenance  #Delirium/Sleep: At risk. Optimize sleep/wake, pain, bowel, bladder management. Avoid deliriogenic meds and physical restraint. Environmental/behavioral interventions.   #Pain: on schedule tylenol. Will make this PRN and monitor  #Bladder: continent of bladder. Check bladder scan if no void for 6 hours  #Skin/Pressure Injury Prevention: Turn Q2hr in bed, with weight shifts [id number] in wheelchair. Float heels in bed.  #DVT Prophylaxis: eliquis  #GI Prophylaxis: n/a  #Code Status: full code  #FEN: diabetic diet with glucerna  #Dispo: ELOS 2-3 weeks pending progress with therapy. Needs f/u with NSGY, ID, cardiology, cardiothoracic  surgery, orthopedic surgery, wound care, podiatry, and PCP   Drug regimen reviewed, all potential adverse effects identified and addressed:    Scheduled Meds:  Current Facility-Administered Medications   Medication Dose Route Frequency Provider Last Rate    acetaminophen  975 mg Oral Q8H PRDAVID Chambers MD      [START ON 9/4/2024] amiodarone  200 mg Oral Daily Mayte Sanchez, PA-C      apixaban  5 mg Oral BID Mayte Sanchez, PA-C      atorvastatin  80 mg Oral Daily With Dinner Mayte Laura, PA-C      ceFAZolin  2,000 mg Intravenous Q8H Mayte Moralestroy, PA-C      cholecalciferol  4,000 Units Oral Daily Maytedavid Moralestroy, PA-C      docusate sodium  100 mg Oral BID Tino Chambers MD      insulin lispro  1-5 Units Subcutaneous HS Mayte Moralesjuanjosekellie, PA-C      insulin lispro  1-6 Units Subcutaneous TID AC Mayte Sanchez, PA-C      metFORMIN  1,000 mg Oral BID With Meals Mayte Sanchez, PA-C      methocarbamol  750 mg Oral Q6H SRIDHAR Mayte Moralestroy, PA-C      metoprolol tartrate  25 mg Oral Q12H [facility] Mayte Moralestroy, PA-C      multivitamin stress formula  1 tablet Oral Daily Mayte Sanchez, PA-DIANNE      oxyCODONE  5 mg Oral Q4H PRDAVID Chambers MD      [START ON 9/4/2024] polyethylene glycol  17 g Oral Daily Mayte Laura, PA-C      senna  1 tablet Oral HS PRDAVID Chambers MD          Restrictions include:  Fall & spinal precautions with C-collar to be worn at all time      Functional History/Home Set-up - Prior to Admission:    Lived with spouse, not able to provide 24 hour supervision  The living area: Two level;Bed/bath upstairs;Performs ADLs on one level  (split level/ 10 LARY and 10 steps to second floor bedroom)  The patient will not have 24 hour supervision/physical assistance available upon discharge.     PREVIOUS DME:  Equipment in home (previous DME): Rolling Walker and Crutches    Functional Status Upon Admission to [facility]:  PT: sup-Isabel transfers, Isabel RW 90', Isabel bed mobility  OT: Isabel UB  dressing, modA LB dressing, Isabel toileting,      Physical Exam:  Temp:  [97.3 °F (36.3 °C)-97.7 °F (36.5 °C)] 97.3 °F (36.3 °C)  HR:  [] 86  Resp:  [17-22] 17  BP: (108-126)/(67-71) 126/67  SpO2:  [93 %-99 %] 98 %    Gen: No acute distress, Well-nourished, well-appearing.  HEENT: Moist mucus membranes, Normocephalic/Atraumatic. C collar on  Cardiovascular: Regular rate, rhythm, S1/S2. Distal pulses palpable. Systolic murmur  Heme/Extr: No edema/clubbing/cyanosis  Pulmonary: Non-labored breathing. Lungs CTAB  : No avilez  GI: Soft, non-tender, non-distended. BS+  MSK: ROM is WFL in all extremities. No effusions or deformities. Bulk is symmetric. See below for MMT scores.   Integumentary: Skin is warm, dry. No rashes or ulcers.  Neuro: AAOx3, CN 2-12 intact. Sensation intact to light touch throughout. Speech is intact. Appropriate to questioning. Tone is normal.    Coord: FTN, DOC, and arm roll all WNL     MMT:   Strength:   Right  Left  Site  Right  Left  Site    5 5  S Ab: Shoulder Abductors  4 4  HF: Hip Flexors    5 5  EF: Elbow Flexors  5  5 KF: Knee Flexors    5  5  EE: Elbow Extensors  5  5  KE: Knee Extensors    5  5  WE: Wrist Extensors  5  5  DR: Dorsi Flexors    5  5  FF: Finger Flexors  5  5  PF: Plantar Flexors    5  5  HI: Hand Intrinsics  5  5  EHL: Extensor Hallucis Longus   Psych: Normal mood and affect.      Laboratory:    Results from last 7 days   Lab Units 08/29/24  0414 08/28/24  0500   HEMOGLOBIN g/dL 11.5* 11.4*   HEMATOCRIT % 38.1 37.6   WBC Thousand/uL 15.12* 12.78*     Results from last 7 days   Lab Units 09/01/24  0636 08/31/24  0407 08/30/24  0539 08/29/24  0414 08/28/24  0000   BUN mg/dL 48* 65* 79*   < > 87*   SODIUM mmol/L 138 137 138   < > 138   POTASSIUM mmol/L 4.5 4.4 4.0   < > 3.5   CHLORIDE mmol/L 99 94* 94*   < > 93*   CREATININE mg/dL 1.09 1.21 1.40*   < > 1.83*   AST U/L  --   --   --   --  99*   ALT U/L  --   --   --   --  7    < > = values in this interval not  "displayed.            Wt Readings from Last 1 Encounters:   09/03/24 111 kg (245 lb 9.6 oz)     Estimated body mass index is 30.7 kg/m² as calculated from the following:    Height as of this encounter: 6' 3\" (1.905 m).    Weight as of this encounter: 111 kg (245 lb 9.6 oz).    Imaging: reviewed  MRI lumbar spine wo contrast 8/30  Compared to 10 days ago, persistent findings of C5-C6 osteodiscitis.  Small anterior paravertebral abscess. No epidural abscess or phlegmon.  Other stable, nonemergent findings above.    MRI lumbar spine wo contrast  Result Date 8/27/2024  Impression: No discitis osteomyelitis involving the thoracic spine. Minimal thoracic spondylosis, as described above. No discitis osteomyelitis involving the lumbar spine. Multilevel lumbar spondylosis, as described above, contributing to at most moderate canal stenosis, right greater than left lateral recess stenosis, moderate to severe right and mild left neural foraminal stenosis at L4-L5. Discitis osteomyelitis again demonstrated at C5-C6, on the localizer/sagittal vertebral counting images. Right pleural effusion. Workstation performed: [id number]      MRI thoracic spine wo contrast  Result Date: 8/27/2024  Impression: No discitis osteomyelitis involving the thoracic spine. Minimal thoracic spondylosis, as described above. No discitis osteomyelitis involving the lumbar spine. Multilevel lumbar spondylosis, as described above, contributing to at most moderate canal stenosis, right greater than left lateral recess stenosis, moderate to severe right and mild left neural foraminal stenosis at L4-L5. Discitis osteomyelitis again demonstrated at C5-C6, on the localizer/sagittal vertebral counting images. Right pleural effusion. Workstation performed: [id number]      CT chest abdomen pelvis wo contrast  Result Date: 8/26/2024  Impression: 1.  New moderate nonspecific pericardial effusion and small bilateral pleural effusions. No other definitive " manifestations of acute infection-allowing for the limitations of noncontrast CT 2.  Complex exophytic right renal cystic lesion, incompletely assessed by noncontrast CT. Nonemergent follow-up gadolinium enhanced MRI of the abdomen is recommended for further evaluation. 3.  Please refer to the report body for description of other incidental chronic and/or benign findings. Workstation performed: [id number]      XR spine cervical 2 or 3 vw injury  Result Date: 8/26/2024  Impression: Destruction of the C5 to space with collapse of the superior endplate of C6 consistent with discitis osteomyelitis which has progressed from the prior studies. Increased soft tissue swelling anterior to C5-6 level. Left central venous catheter terminating at the confluence of the innominate veins. Workstation performed: [id number]      XR chest portable ICU  Result Date: 8/23/2024  Impression: Interval placement of a left IJ approach central venous catheter with tip terminating in the upper SVC near the brachiocephalic junction. Revision is advised. * I personally telephoned this result to Isael Cortes MD  on 8/23/2024 6:40 PM. Workstation performed: [id number]     TTE 8/27    Left Ventricle: Left ventricular cavity size is normal. Wall thickness is moderately increased visually, but not well visualized for accurate measurement. The left ventricular ejection fraction is 50%. Systolic function is low normal. Although no diagnostic regional wall motion abnormality was identified, this possibility cannot be completely excluded on the basis of this study.    Right Ventricle: Right ventricular cavity size is normal. Systolic function is mildly reduced.    Left Atrium: The atrium is normal in size.    Right Atrium: The atrium is normal in size.    Mitral Valve: There is moderate focal calcification of the anterior leaflet and posterior leaflet involving the leaflet margin more than the base. There is annular calcification. There is  mild regurgitation.    Pericardium: There is a moderate pericardial effusion circumferential to the heart. The fluid exhibits a fibrinous appearance. The largest diameter measures 1.3 cm. There is no echocardiographic evidence of tamponade. The evidence against tamponade includes: no right ventricular diastolic collapse, no right ventricular compression, no right atrial inversion. There is mild respiratory variation, although this is likely secondary to atrial fibrillation.    Prior TTE study available for comparison. Prior study date: 8/24/2024. No significant changes noted compared to the prior study.    TTE 8/24  Left Ventricle: Left ventricular cavity size is normal. Wall thickness is not well visualized. The left ventricular ejection fraction is 45%. Systolic function is mildly reduced. Wall motion cannot be accurately assessed due to poor endocardial border definition. There appears to be at least mild global hypokinesis.    Right Ventricle: Systolic function is mildly to moderately reduced.    Aortic Valve: The leaflets are moderately calcified. There is moderately reduced mobility.    Mitral Valve: There is moderate focal calcification of the anterior leaflet and posterior leaflet. There is mild regurgitation.    Pericardium: There is a moderate pericardial effusion circumferential to the heart. The fluid exhibits no internal echoes. The largest diameter measures 1.4 cm anteriorly. There is no clear echocardiographic evidence of tamponade. The evidence against tamponade includes: no right ventricular diastolic collapse, no right ventricular compression and no right atrial inversion. Inflow respiratory variation was assessed but the available doppler windows are suboptima    Rehabilitation Prognosis: good     Tolerance for three hours of therapy a day: good     Family/Patient Goals:  Patient/family's goals: Return to previous home/apartment.    Patient will receive PT and OT 90 minutes each per day, five days  per week to achieve rehab goals or participate in 900 minutes of therapy within a 7 day week period.    Mobility Goals: [place]  Transfer Goals: [place]  Activities of Daily Living (ADLs) Goals: [place]    Discharge Planning:  Rehabilitation and discharge goals discussed with the patient and/or family.    Case Managment and Social Work to review patient/family resources and to coordinate Discharge Planning.    Estimated length of stay: 2 - 3 weeks    Patient and Family Education and Training:  Rehabilitation and discharge goals discussed with the patient and/or family.  Patient/family education/training needs to be discussed in weekly team meeting.    Equipment/DME needs: Therapy teams to assess and evaluate for additional equipment/DME needs throughout rehabilitation stay    Past Medical History:   Past Surgical History:   Family History:   Social history:   Past Medical History:   Diagnosis Date    Arthritis 2010's    Occasionally flares up    Cancer (HCC) May 2021    Still investigating    Chronic kidney disease     Diabetes mellitus (HCC)     Follicular lymphoma (HCC)     GERD (gastroesophageal reflux disease) June 2021    Noticed in an Endoscopy    Heart murmur May 2020    High cholesterol     Hypertension     Kidney stone 1980's    Have had since 1980's    Obesity Since Childhood    Past Surgical History:   Procedure Laterality Date    BUNIONECTOMY Right 11/24/2020    Procedure: RIGHT HAV CORRECTION,;  Surgeon: Niranjan Child DPM;  [facility];  Service: Podiatry    COLONOSCOPY      INCISION AND DRAINAGE OF WOUND Right 10/05/2016    Procedure: INCISION AND DRAINAGE (I&D) EXTREMITY;  Surgeon: Niranjan Child DPM;  Location[facility];  Service:     IR BIOPSY LYMPH NODE  07/08/2021    IR EMBOLIZATION (SPECIFY VESSEL OR SITE)  07/08/2021    IR PORT PLACEMENT  9/1/2022    IR TUNNELED CENTRAL LINE PLACEMENT  8/29/2024    PILONIDAL CYST EXCISION      WV CORRECTION HAMMERTOE Right 02/19/2019     Procedure: THIRD HAMMER TOE CORRECTION;  Surgeon: Niranjan Child DPM;  [facility];  Service: Podiatry    ME RMVL MATEO CTR VAD W/SUBQ PORT/ CTR/PRPH INSJ N/A 3/14/2023    Procedure: REMOVAL VENOUS PORT (PORT-A-CATH)IR;  Surgeon: Avelino Vázquez DO;  [facility];  Service: Interventional Radiology    TOE OSTEOTOMY Right 03/14/2017    Procedure: HAMMERTOE CORRECTION R 2 ;  Surgeon: Niranjan Child DPM;  [facility];  Service:     TOE OSTEOTOMY Left 11/24/2020    Procedure: LEFT HT CORRECTION TOE;  Surgeon: Niranjan Child DPM;  [facility];  Service: Podiatry    TONSILLECTOMY  1963     Family History   Problem Relation Age of Onset    Cancer Father         Leukemia      Social History     Socioeconomic History    Marital status: /Civil Union     Spouse name: None    Number of children: None    Years of education: None    Highest education level: None   Occupational History    None   Tobacco Use    Smoking status: Never    Smokeless tobacco: Never    Tobacco comments:     Never smoked but exposed to second hand smoke from birth until 1980's   Vaping Use    Vaping status: Never Used   Substance and Sexual Activity    Alcohol use: Never    Drug use: Never    Sexual activity: Not Currently     Partners: Female     Birth control/protection: Abstinence   Other Topics Concern    None   Social History Narrative    None     Social Determinants of Health     Financial Resource Strain: Not on file   Food Insecurity: No Food Insecurity (9/3/2024)    Hunger Vital Sign     Worried About Running Out of Food in the Last Year: Never true     Ran Out of Food in the Last Year: Never true   Transportation Needs: No Transportation Needs (9/3/2024)    PRAPARE - Transportation     Lack of Transportation (Medical): No     Lack of Transportation (Non-Medical): No   Physical Activity: Not on file   Stress: Not on file   Social Connections: Unknown (6/18/2024)    Received from [facility]     How often do you feel lonely or isolated from those around you? (Adult - for ages 18 years and over): Not on file   Intimate Partner Violence: Not on file   Housing Stability: Low Risk  (9/3/2024)    Housing Stability Vital Sign     Unable to Pay for Housing in the Last Year: No     Number of Times Moved in the Last Year: 0     Homeless in the Last Year: No          Current Medical Diagnosis Allergies   Patient Active Problem List   Diagnosis    Diabetes 1.5, managed as type 2 (HCC)    History of hypertension    Hypercholesteremia    Hammer toe of right foot    Stasis dermatitis of both legs    Diabetic peripheral neuropathy (HCC)    Gout    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HCC)    Obesity with body mass index 30 or greater    Type 2 diabetes mellitus (HCC)    Retroperitoneal hematoma    Mesenteric lymphadenopathy    Acute blood loss anemia    FINA (acute kidney injury) on CKD stage 2(HCC)    Heart murmur    GI bleed    Pleural effusion    Chronic venous hypertension (idiopathic) with ulcer of right lower extremity (HCC)    Rash    Stage 2 chronic kidney disease    Hypertensive kidney disease with stage 2 chronic kidney disease    Other proteinuria    Obesity, morbid (HCC)    Follicular lymphoma grade I of lymph nodes of multiple sites (HCC)    Vitamin D deficiency    Stage 3a chronic kidney disease (HCC)    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 (HCC)    Ectatic thoracic aorta (HCC)    Aortic stenosis with trileaflet valve    Sepsis without acute organ dysfunction (HCC)    Acute respiratory failure with hypoxia (HCC)    Acute hyponatremia    Chronic diastolic CHF (congestive heart failure) (HCC)    Encounter for deep vein thrombosis (DVT) prophylaxis    Hyponatremia    Neck pain    Acute osteomyelitis of cervical spine (HCC)    Transaminitis    Acute renal failure with oliguria  (HCC)    MSSA bacteremia    New onset a-fib (HCC)    Acute pericarditis    Diabetic ulcer of right foot (HCC)    Constipation     Allergies   Allergen Reactions    Lisinopril Cough           Medical Necessity Criteria for [facility] Admission: IV antibiotics, Chronic Kidney Disease, Hypertension, Bowel/Bladder Management, Diabetes requiring close blood glucose monitoring, Incision/Wound care, and Pressure sore/Skin Tear. In addition, the preadmission screen, post-admission physical evaluation, overall plan of care and admissions order demonstrate a reasonable expectation that the following criteria were met at the time of admission to the [facility].  The patient requires active and ongoing therapeutic intervention of multiple therapy disciplines (physical therapy, occupational therapy, speech-language pathology, or prosthetics/orthotics), one of which is physical or occupational therapy.    Patient requires an intensive rehabilitation therapy program, as defined in Chapter 1, section 110.2.2 of the CMS Medicare Policy Manual. This intensive rehabilitation therapy program will consist of at least 3 hours of therapy per day at least 5 days per week or at least 15 hours of intensive rehabilitation therapy within a 7 consecutive day period, beginning with the date of admission to the [facility].    The patient is reasonably expected to actively participate in, and benefit significantly from, the intensive rehabilitation therapy program as defined in Chapter 1, section 110.2.2 of the CMS Medicare Policy Manual at this time of admission to the [facility]. He can reasonably be expected to make measurable improvement (that will be of practical value to improve the patient’s functional capacity or adaptation to impairments) as a result of the rehabilitation treatment, as defined in section 110.3, and such improvement can be expected to be made within the prescribed period of time. As noted in the CMS Medicare Policy Manual, the patient need not be expected to achieve complete independence in the domain of self-care nor be expected to return to his or her prior level of  functioning in order to meet this standard.  The patient must require physician supervision by a rehabilitation physician. As such, a rehabilitation physician will conduct face-to-face visits with the patient at least 3 days per week throughout the patient’s stay in the [facility] to assess the patient both medically and functionally, as well as to modify the course of treatment as needed to maximize the patient’s capacity to benefit from the rehabilitation process.  The patient requires an intensive and coordinated interdisciplinary approach to providing rehabilitation, as defined in Chapter 1, section 110.2.5 of the CMS Medicare Policy Manual. This will be achieved through periodic team conferences, conducted at least once in a 7-day period, and comprising of an interdisciplinary team of medical professionals consisting of: a rehabilitation physician, registered nurse,  and/or , and a licensed/certified therapist from each therapy discipline involved in treating the patient.       ** Please Note: Fluency Direct voice to text software may have been used in the creation of this document. **

## 2024-09-03 NOTE — ASSESSMENT & PLAN NOTE
Last BM 9/4  Will de-escalate to docusate BID and miralax daily  Monitor and adjust as appropriate.

## 2024-09-03 NOTE — CASE MANAGEMENT
Case Management Discharge Planning Note    Patient name Augustus Coffman  Location [facility] 505/[facility] 505-01 MRN [id number]  : 1955 Date 9/3/2024       Current Admission Date: 2024  Current Admission Diagnosis:MSSA bacteremia   Patient Active Problem List    Diagnosis Date Noted Date Diagnosed    Constipation 2024     Acute pericarditis 2024     Diabetic ulcer of right foot (HCC) 2024     MSSA bacteremia 2024     New onset a-fib ([facility]) 2024     Acute osteomyelitis of cervical spine ([facility]) 2024     Transaminitis 2024     Acute renal failure with oliguria  ([facility]) 2024     Neck pain 2024     Sepsis without acute organ dysfunction ([facility]) 2024     Acute respiratory failure with hypoxia ([facility]) 2024     Acute hyponatremia 2024     Chronic diastolic CHF (congestive heart failure) ([facility]) 2024     Encounter for deep vein thrombosis (DVT) prophylaxis 2024     Hyponatremia 2024     Aortic stenosis with trileaflet valve 07/15/2024     Ectatic thoracic aorta ([facility]) 2024     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 ([facility]) 10/09/2023     Vitamin D deficiency 2023     Stage 3a chronic kidney disease ([facility]) 2023     Follicular lymphoma grade I of lymph nodes of multiple sites ([facility]) 08/15/2022     Obesity, morbid ([facility]) 2022     Other proteinuria 2022     Stage 2 chronic kidney disease 09/15/2021     Hypertensive kidney disease with stage 2 chronic kidney disease 09/15/2021     Rash 2021     Chronic venous hypertension (idiopathic) with ulcer of right lower extremity ([facility]) 2021     GI bleed 2021     Pleural effusion 2021     Heart murmur 2021     Retroperitoneal hematoma 2021     Mesenteric lymphadenopathy 2021     Acute blood loss anemia 2021     FINA (acute kidney injury) on CKD stage 2(HCC) 2021     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HCC) 2021     Stasis dermatitis of  both legs 04/17/2019     Hammer toe of right foot 02/19/2019     Obesity with body mass index 30 or greater 10/31/2016     Diabetes 1.5, 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     History of hypertension 10/06/2016     Hypercholesteremia 10/06/2016     Diabetic peripheral neuropathy (HCC) 11/10/2014     Gout 12/06/2007     Type 2 diabetes mellitus (HCC) 12/06/2007       LOS (days): 11  Geometric Mean LOS (GMLOS) (days): 5.1  Days to GMLOS:-5.6     OBJECTIVE:  Risk of Unplanned Readmission Score: 20.08         Current admission status: Inpatient   Preferred Pharmacy:   [id number] [place], [place] - [address]  [address] [address]  Phone: [phone] Fax: [phone]    [id number], [place] - [address] [address]  Phone: [phone] Fax: [phone]    Primary Care Provider: Gwen Lazo DO    Primary Insurance: MEDICARE  Secondary Insurance: [facility]    DISCHARGE DETAILS:          Transported by (Company and Unit #): internal transport          Additional Comments: CM was informed via Secure Chat there is a bed available at [facility] room 973 and report can be call to [phone].  CM is still awaiting determination fromjose manuel Walter at [facility] if wife can stay at bedside at night.  P5 RN Rigo confirmed patient has tunneled PICC.  Transport information was sent to Rigo via Secure Chat for DC needs.  CM to be available    Accepting Facility Name, City & State : [facility]     Facility/Agency Fax Number: [phone]     12pm  Saba from [facility] requested details regarding why patient wants wife at bedside at night.  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  This information was shared with Sbaa and awaiting determination if wife can stay with patient overnight at [facility]. Patient informed KARINA he  will still go to [facility] even if wife cannot stay with him overnight.  12:15pm  Via Secure Chat Saba informed CM wife can stay overnight as long as it does not interfere with rehab.  Patient was informed.  No further CM needs anticipated.

## 2024-09-03 NOTE — PLAN OF CARE
Problem: Prexisting or High Potential for Compromised Skin Integrity  Goal: Skin integrity is maintained or improved  Description: INTERVENTIONS:  - Identify patients at risk for skin breakdown  - Assess and monitor skin integrity  - Assess and monitor nutrition and hydration status  - Monitor labs   - Assess for incontinence   - Turn and reposition patient  - Assist with mobility/ambulation  - Relieve pressure over bony prominences  - Avoid friction and shearing  - Provide appropriate hygiene as needed including keeping skin clean and dry  - Evaluate need for skin moisturizer/barrier cream  - Collaborate with interdisciplinary team   - Patient/family teaching  - Consider wound care consult   Outcome: Progressing     Problem: Prexisting or High Potential for Compromised Skin Integrity  Goal: Skin integrity is maintained or improved  Description: INTERVENTIONS:  - Identify patients at risk for skin breakdown  - Assess and monitor skin integrity  - Assess and monitor nutrition and hydration status  - Monitor labs   - Assess for incontinence   - Turn and reposition patient  - Assist with mobility/ambulation  - Relieve pressure over bony prominences  - Avoid friction and shearing  - Provide appropriate hygiene as needed including keeping skin clean and dry  - Evaluate need for skin moisturizer/barrier cream  - Collaborate with interdisciplinary team   - Patient/family teaching  - Consider wound care consult   Outcome: Progressing

## 2024-09-03 NOTE — ASSESSMENT & PLAN NOTE
Baseline Hgb 10-12 8/29 11.5 --> 9/6 9.0 --> 9.1 on 9/9  Monitor for s/s bleeding  See bacteremia for plan

## 2024-09-03 NOTE — TREATMENT PLAN
Individualized Plan of Care - [facility]  Augustus Coffman 68 y.o. male MRN: [id number]  Unit/Bed#: -01 Encounter: [id number]     PATIENT INFORMATION  ADMISSION DATE: 9/3/2024  2:48 PM JAIR CATEGORY:Other Disabling Impairments:  13  Other Disabling Impairments   ADMISSION DIAGNOSIS: MSSA (methicillin susceptible Staphylococcus aureus) [A49.01]  EXPECTED LOS: 2 - 3 weeks     MEDICAL/FUNCTIONAL PROGNOSIS  Based on my assessment of the patient's medical conditions and current functional status, the prognosis for attaining medical and functional goals or the IRF stay is:  Good    Medical Goals: Patient will be medically stable for discharge to [facility] upon completion of rehab program    1. Adequate pain control   - moderate pain controlled by robaxin and tylenol, will need to monitor and titrate meds accordingly given pain from osteomyelitis  2. Prevention of VTE   - Eliquis in setting of new onset Afib  3. Adequate secretion management, and monitoring of respiratory status   - required oxygen up to a few days before admission, continue conservative measures and mobilize with therapies  4. Bowel/bladder management   - goals are regularity and continence.   5. Maintain appropriate nutrition and hydration for healing and hemodynamic stability   - having impaired taste since admission and improving, however will need to have nutrition involved to make sure getting adequate calories during recovery  6. Emotional adaptation to impairment   - medically complex patient with multiple changes in functional status in last 3 months  7. Monitor for polypharmacy   - on antibiotics, pain medicines  8. Fall prevention   - requiring min assistance with ambulation with RW   - Has peripheral neuropathy and now restrictions with c-collar in place. Education on strategies to compensate.  9. Patient and family caregiver training/education  10. Skin protection and prevention of pressure  injury  11. Appropriate management of new and chronic comorbidites and sequelae of her acute hospitalization.    - new c-collar   - need for pain control and T2DM wound care   - CHF, T2DM, HTN, HLD, Vitamin D deficiency, osteopenia, Afib  12. Prevention of delirium   - monitor encephalopathy and need for 1:1 sitter for safety reasons  13. Arrange appropriate outpatient f/u   - PCP, CT surgery, cardiology, wound care, infectious disease, podiatry  14. Incisional care to prevent infection/dehiscence    ANTICIPATED DISCHARGE DISPOSITION AND SERVICES  COMMUNITY SETTING: Home - independent/modified independent  Is a 24-hr caregiver available? Yes  Has discharge plan been discussed with primary caregiver?  No    ANTICIPATED FOLLOW-UP SERVICE:   Outpatient Therapy Services: PT and OT    vs.  Home Health Services: PT, OT, and Nursing    DISCIPLINE SPECIFIC PLANS:  Required Disciplines & Services: Rehabillitation Nursing, Dietay/Nutrition, and Diabetes Education    REQUIRED THERAPY:  Therapy Hours per Day Days per Week   Physical Therapy 1.5-2 5-6   Occupational Therapy 1.5-2 5-6   NOTE: Additional therapy time(s) or changes to allocation of therapies as appropriate to meet patient needs and to achieve functional goals.    Patient will participate in above therapy regimen consisting of PT and OT due to the following medical procedure/condition:Other Disabling Impairments:  13  Other Disabling Impairments, MSSA bacteremia with cervical osteomyelitis    ANTICIPATED FUNCTIONAL OUTCOMES:  ADL:  mod ind   Bladder/Bowel:  ind   Transfers:  mod ind   Locomotion:  mod ind   Cognitive:  ind     DISCHARGE PLANNING NEEDS  Equipment needs: Discharge needs to be reviewed with team      REHAB ANTICIPATED PARTICIPATION RESTRICTIONS:  Assist with Mobility, Decreased Insight to Deficits, Decreaed Safety Awareness, and Weight Bearing less than 5 lbs upper extremity  C-collar and C-spine precautions.

## 2024-09-03 NOTE — ASSESSMENT & PLAN NOTE
Had LUCRECIA 8/27  Per cardiology- no plans for drainage  Completed colchicine and prednisone on 8/27  Needs follow up LUCRECIA in 1 month

## 2024-09-03 NOTE — ASSESSMENT & PLAN NOTE
Mild elevation seen earlier in hospitalization  Has been on schedule tylenol  RUQ US unremarkable for pathology  Recheck tomorrow AM

## 2024-09-03 NOTE — ASSESSMENT & PLAN NOTE
Lab Results   Component Value Date    HGBA1C 6.8 (H) 07/05/2024       Recent Labs     09/04/24  1632 09/04/24 2053 09/05/24 0625 09/05/24  1055   POCGLU 111 148* 137 108       Blood Sugar Average: Last 72 hrs:  (P) 129.5  On jardiance & metformin outpatient, held during FINA  Complicated by diabetic ulcer as noted to be likely source as above  Continue SSI per IM  Consider adding back orals given improved kidney function to baseline

## 2024-09-03 NOTE — PROGRESS NOTES
Podiatry - Progress Note  Patient: Augustus Coffman 68 y.o. male   MRN: [id number]  PCP: Gwen Lazo DO  Unit/Bed#: [facility] 505-01 Encounter: [id number]  Date Of Visit: 09/03/24    ASSESSMENT:    Augustus Coffman is a 68 y.o. male with:    Right full-thickness diabetic foot ulceration submetatarsal 1, stable  Type 2 diabetes mellitus  Septic shock  MSSA bacteremia  Pericarditis  Atrial fibrillation  Acute kidney injury  UTI      PLAN:    Patient was seen and evaluated at bedside with Dr. Pineda. Dr. Child DPWESLEY was also present during the visit as a friend and gave updates and suggestions about the plan of care. All questions and concerns addressed.  Dressing was changed. The previous dressing was clean, dry and intact without strikethrough. The current dressing is Dermagran DSD. Patient is to be discharged to [facility] and will follow-up with wound q week.   Left plantar 1st MT head wound does not show any signs of infection. Healthy granulation tissue found in the wound bed. Will keep dermagran DSD dressings. Appreciate help from nursing team for dressing change.   Patients' lab and vital signs were reviewed. Patient is afebrile with no leukocytosis. Patient does not  meet the SIRS criteria.   Antibiotic choice of primary team.   Weight bearing status discussed with the patient and Dr. Child. Patient is currently WBAT in surgical shoe to the heel. Will continue with the current weight bearing status and keep monitoring.   Patient is stable to be discharged from podiatric standpoint.  Elevation and offloading on green foam wedges or pillows when non-ambulatory.  Rest of care per primary team.     Weightbearing status: Weightbearing as tolerated right foot in surgical shoe to the heel.    SUBJECTIVE:     The patient was seen, evaluated, and assessed at bedside today. The patient was awake, alert, and in no acute distress. No acute events overnight. The patient reports No pain or discomfort to the right foot. Patient  "denies N/V/F/chills/SOB/CP.      OBJECTIVE:     Vitals:   /71 (BP Location: Left arm)   Pulse 85   Temp (!) 97.4 °F (36.3 °C) (Oral)   Resp 22   Ht 6' 3\" (1.905 m)   Wt 111 kg (244 lb 8 oz)   SpO2 97%   BMI 30.56 kg/m²     Temp (24hrs), Av.9 °F (36.6 °C), Min:97.4 °F (36.3 °C), Max:98.5 °F (36.9 °C)      Physical Exam:     Lungs: Non labored breathing  Abdomen: Soft, non-tender.  Lower Extremity:  Cardiovascular status at baseline from admission.  Neurological status at baseline from admission.  Musculoskeletal status  at baseline from admission. No calf tenderness noted.     Wound #: 1  Location: right sub met 1  Length 0.8cm: Width 0.6cm: Depth 0.1cm:   Deepest Tissue Noted in Base: subcutaneous  Probe to Bone: No  Peripheral Skin Description: Attached  Granulation: 70% Fibrotic Tissue: 30% Necrotic Tissue: 0%   Drainage Amount: minimal, serous  Signs of Infection: No    Clinical Images 24:      Additional Data:     Labs:    Results from last 7 days   Lab Units 24  0414   WBC Thousand/uL 15.12*   HEMOGLOBIN g/dL 11.5*   HEMATOCRIT % 38.1   PLATELETS Thousands/uL 415*     Results from last 7 days   Lab Units 24  0636 24  0414 24  0000   POTASSIUM mmol/L 4.5   < > 3.5   CHLORIDE mmol/L 99   < > 93*   CO2 mmol/L 31   < > 32   BUN mg/dL 48*   < > 87*   CREATININE mg/dL 1.09   < > 1.83*   CALCIUM mg/dL 9.5   < > 9.5   ALK PHOS U/L  --   --  218*   ALT U/L  --   --  7   AST U/L  --   --  99*    < > = values in this interval not displayed.           * I Have Reviewed All Lab Data Listed Above.    Recent Cultures (last 7 days):               Imaging: I have personally reviewed pertinent films in PACS  EKG, Pathology, and Other Studies: I have personally reviewed pertinent reports.    ** Please Note: Portions of the record may have been created with voice recognition software. Occasional wrong word or \"sound a like\" substitutions may have occurred due to the inherent " limitations of voice recognition software. Read the chart carefully and recognize, using context, where substitutions have occurred. **

## 2024-09-03 NOTE — ASSESSMENT & PLAN NOTE
On Tylenol Q8hr PRN  Robaxin 750mg Q5hr scheduled  Oxycodone 5mg Q4hr PRN  Would not apply heat to this region.  May use other topicals  Monitor and manage as appropriate.

## 2024-09-03 NOTE — ASSESSMENT & PLAN NOTE
Presented with classic symptoms and with diffuse ST elevation on 8/22 EKG  8/22 Echo: Limited echo for assessment of endocarditis.  The patient's aortic valve is difficult to assess due to calcification but there is no new significant regurgitation.  The mitral valve has hyperechoic thickening at the tips consistent with most likely calcification, these were seen on the echocardiograms in July.  The tricuspid valve similarly was not well-visualized but no new regurgitation.  The pulmonary valve was not well-visualized. Off note patient has new moderate pericardial effusion without specific echocardiographic signs of tamponade  8/27 TTE - EF 50%, mod focal calcification mitral valve, moderate circumferential pericardial effusion, fluid with fibrinous appearance, no sign of tamponade      Drain deferred due to no tamponade  No thoracic surgery indication.  LUCRECIA when c-collar removed in 6 weeks.  Repeat TTE in 4 weeks.  Completed Colchicine and Prednisone on 8/27  Close hemodynamic monitoring  Outpatient follow up with cardiology for monitoring of effusion   140

## 2024-09-03 NOTE — ASSESSMENT & PLAN NOTE
Lab Results   Component Value Date    HGBA1C 6.8 (H) 07/05/2024       Recent Labs     09/02/24  2033 09/03/24  0641 09/03/24  1020 09/03/24  1541   POCGLU 155* 144* 131 138       Blood Sugar Average: Last 72 hrs:  (P) 138    Home regimen: metformin and jardiance  Here: continue metformin, jardiance not on formulary here in hospital.  Will order fingersticks with sliding scale as well

## 2024-09-03 NOTE — DISCHARGE SUMMARY
[facility]  Discharge- Augustus Coffman 1955, 68 y.o. male MRN: [id number]  Unit/Bed#: [facility] 505-01 Encounter: [id number]  Primary Care Provider: Gwen Lazo DO   Date and time admitted to hospital: 8/23/2024  7:03 PM    * MSSA bacteremia  Assessment & Plan  Patient with prior admission with MSSA bacteremia on 7/28 treated with 7 days of antibiotics  Etiology likely 2/2 right foot ulcer  Complicated by cervical discitis  TTE without evidence of endocarditis  ID consulted, plan for ancef x 6 weeks  8/23 Blood cultures: -No growth after 5 days  MRSA swab negative   MRI - Discitis osteomyelitis again demonstrated at C5-C6, repeat MRI C spine is negative for meningeal enhancement  Pericardial drain deferred by interventional cardiology  LUCRECIA deferred by cardiology, - will obtain in the future when c collar is discontinued  Check limited TTE in 1 month  CBC CMP weekly while on antibiotics.  Discontinue PICC line once the IV antibiotics is completed  Outpatient follow-up with infectious disease    Constipation  Assessment & Plan  Continue bowel regimen  Had a bowel movement yesterday    Diabetic ulcer of right foot (HCC)  Assessment & Plan  Lab Results   Component Value Date    HGBA1C 6.8 (H) 07/05/2024       Recent Labs     08/30/24  1539 08/30/24  2100 08/31/24  0556 08/31/24  1047   POCGLU 186* 138 166* 176*         Blood Sugar Average: Last 72 hrs:  (P) 155.2165438659946655  Follows as an outpatient with wound care  Likely source of MSSA bacteremia   Continue local wound care  Podiatry consulted  WBAT  Dressing instructions per podiatry.  Recommended outpatient follow-up with podiatry once the patient is discharged from acute rehab    Acute pericarditis  Assessment & Plan  8/22 Echo: Limited echo for assessment of endocarditis.  The patient's aortic valve is difficult to assess due to calcification but there is no new significant regurgitation.  The mitral valve has  hyperechoic thickening at the tips consistent with most likely calcification, these were seen on the echocardiograms in July.  The tricuspid valve similarly was not well-visualized but no new regurgitation.  The pulmonary valve was not well-visualized. Off note patient has new moderate pericardial effusion without specific echocardiographic signs of tamponade  Patient with symptoms consistent with pericarditis  8/27 TTE - EF 50%, mod focal calcification mitral valve, moderate circumferential pericardial effusion, fluid with fibrinous appearance, no sign of tamponade   Cardiology consulted, no plans for drainage   Completed Colchicine and Prednisone on 8/27  Close hemodynamic monitoring  Outpatient follow up with cardiology for monitoring of effusion  Patient needed a repeat TTE in a month    New onset a-fib ([facility])  Assessment & Plan  Developed new onset afib 8/21/8/22 and was started on amiodarone infusion-  Plan was for amiodarone 200 mg p.o. twice daily for 5 days and then 200 mg daily.  Currently changed to 200 mg daily  Metoprolol 25 mg BID -can uptitrate as BP tolerates   Eliquis for anticoagulation      Transaminitis  Assessment & Plan  Likely reactive to his infection  Improving gradually  Monitor LFTs periodically until resolved    Acute osteomyelitis of cervical spine ([facility])  Assessment & Plan  8/20 MRI C-Spine: Imaging findings suspicious for discitis osteomyelitis at C5-C6. 3 mm epidural phlegmon/small abscess is also suspected. There is moderate resultant central stenosis and mild mass effect on the cord  8/27 MRI - Discitis osteomyelitis again demonstrated at C5-C6   Neurosurgery consulted   Upright xrays completed  Recommending wearing of C-collar which is currently in place  Recommending 6 weeks IV antibiotics and follow-up repeat imaging  Monitor neurologic checks  8/29 MRI cervical spine with stable findings and no meningeal enhancement    FINA (acute kidney injury) on CKD stage 2([facility])  Assessment &  Plan  Baseline Cr 1-1.2  Developed FINA 8/23 with associated hyperkalemia  Nephrology was consulted.  Villa removed  Avoid nephrotoxins  Trend renal function - improved  Outpatient follow up with nephrology once the patient is discharged.        Type 2 diabetes mellitus (HCC)  Assessment & Plan  Lab Results   Component Value Date    HGBA1C 6.8 (H) 07/05/2024       Recent Labs     08/30/24  1539 08/30/24  2100 08/31/24  0556 08/31/24  1047   POCGLU 186* 138 166* 176*         Blood Sugar Average: Last 72 hrs:  (P) 155.2258030293861599  Home regimen: metformin and Jardiance  Continue diabetic diet and sliding scale coverage  Goal blood glucose 140-180      History of hypertension  Assessment & Plan  BP is acceptable, continue metoprolol  Patient was on Norvasc and losartan as outpatient which is on hold at the time of discharge.  Reevaluate the need to add antihypertensives as outpatient        Medical Problems       Resolved Problems  Date Reviewed: 9/3/2024            Resolved    Hyperkalemia 8/27/2024     Resolved by  Amy Franco PA-C    Acute respiratory insufficiency 8/29/2024     Resolved by  Tom Andre MD    Hypotension 8/25/2024     Resolved by  Breonna Santana PA-C        Discharging Physician / Practitioner: Cari Sarabia MD  PCP: Gwen Lazo DO  Admission Date:   Admission Orders (From admission, onward)       Ordered        08/23/24 1905  INPATIENT ADMISSION  Once                          Discharge Date: 09/03/24    Consultations During Hospital Stay:  Interventional radiology  PMR  Podiatry  Neurosurgery  Infectious disease  Nephrology  Thoracic surgery  Cardiology    Procedures Performed:   PICC line placement    Significant Findings / Test Results:   MRI cervical spine 8/30-persistent findings of C5-C6 osteodiscitis.  Small anterior paravertebral abscess.  No epidural abscess or phlegmon  MR lumbar spine-no discitis or osteomyelitis involving thoracic spine or lumbar spine.  Multilevel  lumbar spondylosis moderate to severe right and mild left neural foraminal stenosis L4-L5  CT chest abdomen and pelvis-moderate nonspecific pericardial effusion and small bilateral pleural effusions.  No other definitive manifestations of acute infection allowing for the limitations of noncontrast CT.  Complex exophytic right renal cyst lesion incompletely assessed by the noncontrast CT.  Nonemergent follow-up gadolinium enhanced MRI of the abdomen is recommended for further evaluation.  Echocardiogram-left ventricular ejection fraction is 50%.  Moderate pericardial effusion circumferential to the heart.  The fluid exhibits a fibrinous appearance.  There is no echocardiographic evidence of tamponade.    Incidental Findings:     Complex exophytic right renal cyst lesion incompletely assessed by the noncontrast CT.  Nonemergent follow-up gadolinium enhanced MRI of the abdomen is recommended for further evaluation.    Test Results Pending at Discharge (will require follow up):        Outpatient Tests Requested:  CBC CMP weekly while on antibiotics.  Transthoracic echocardiogram in a month      Complications:   none    Reason for Admission: MSSA bacteremia    Hospital Course:   Augustus Coffman is a 68 y.o. male patient who originally presented to the hospital on 8/23/2024 as a transfer from [facility].  This is a 68-year-old male with past medical history significant for type 2 diabetes mellitus, diabetic foot wound, hypertension who presented to the [facility] ED on 8/20 after an outpatient MRI revealed osteomyelitis and discitis.  Patient has had neck pain for the past 6 to 7 weeks with no associated weakness.  Patient was recently admitted to the hospital with MSSA bacteremia with source of right foot diabetic infection.  Case was discussed with the neurosurgery and infectious disease on 8/20 and it was decided that patient can be treated with IV antibiotics.  Antibiotics managed  by infectious disease.  Patient had new onset of chest pain and developed atrial fibrillation on EKG with evidence of pericarditis and echocardiogram was concerning for pericardial effusion without evidence of tamponade.  Patient was transferred to [facility] to be evaluated by cardiology and also by thoracic surgery.  Patient developed A-fib with RVR and with associated hypotension and was admitted to the intensive care unit at the time of admission to [facility] campus and he was on pressors.  Echocardiogram did not show any evidence of vegetation.  Patient was evaluated by cardiology and infectious disease and continued on the antibiotics.  Patient was also evaluated by thoracic surgery.  Thoracic surgery recommended that he does not need any surgical intervention for the pericardial effusion.  There is a question that he may benefit from pericardiocentesis since the patient was hemodynamically stable and improving cardiology decided against pericardiocentesis.  For the atrial fibrillation patient was on amiodarone drip which was subsequently changed to p.o. amiodarone load and will be discharged on amiodarone 200 mg daily.  Neurosurgery evaluated the patient.  Had MRI of the thoracic and lumbar spine which was negative for osteomyelitis or discitis.  MRI cervical spine showed stable changes.Patient also developed acute kidney injury and was evaluated by nephrology.  Creatinine peaked at 3.38.  Creatinine is down to 1.09 on 9/1 once.  Patient was hemodynamically stable he was transferred out of the intensive care unit on 8/28.  Patient required Bumex infusion while in the intensive care unit.  Patient had tunneled PICC line placed by interventional radiology on 8/29.  Patient was cleared by various consultants for discharge.  PMR evaluated the patient.  Patient was felt to be appropriate for acute rehab.  Patient will be discharged to acute rehab in a stable condition on 9/3.  Patient  "need CBC CMP weekly while on antibiotics.  PICC line should be discontinued after the completion of antibiotics.  Need outpatient follow-up with neurosurgery as outpatient.  As per cardiology need TTE in a month and LUCRECIA can be considered once the patient can come off of the cervical collar        Please see above list of diagnoses and related plan for additional information.     Condition at Discharge: stable    Discharge Day Visit / Exam:   Subjective: Patient seen and examined.  Reported that he is doing significantly better now.  Denies any specific complaints.  Agreeable for discharge to acute rehab  Vitals: Blood Pressure: 111/71 (09/03/24 0720)  Pulse: 85 (09/03/24 1130)  Temperature: (!) 97.4 °F (36.3 °C) (09/03/24 0720)  Temp Source: Oral (09/03/24 0720)  Respirations: 22 (09/03/24 0720)  Height: 6' 3\" (190.5 cm) (08/27/24 0900)  Weight - Scale: 111 kg (244 lb 8 oz) (09/03/24 0300)  SpO2: 97 % (09/03/24 1130)  Exam:   Physical Exam  Constitutional:       General: He is not in acute distress.     Appearance: He is not ill-appearing.   HENT:      Head: Normocephalic and atraumatic.      Nose: Nose normal.   Eyes:      General: No scleral icterus.  Neck:      Comments: Cervical collar present  Cardiovascular:      Rate and Rhythm: Normal rate.      Heart sounds: No murmur heard.  Pulmonary:      Comments: Decreased breath sounds bilateral but clear to auscultation  Abdominal:      General: Bowel sounds are normal. There is no distension.      Tenderness: There is no abdominal tenderness.   Musculoskeletal:         General: Normal range of motion.      Right lower leg: Edema present.      Left lower leg: Edema present.   Skin:     General: Skin is warm.      Coloration: Skin is not jaundiced.   Neurological:      General: No focal deficit present.      Mental Status: He is alert and oriented to person, place, and time.      Cranial Nerves: No cranial nerve deficit.          Discussion with Family: Attempted to " update  (wife) via phone. Left voicemail.     Discharge instructions/Information to patient and family:   See after visit summary for information provided to patient and family.      Provisions for Follow-Up Care:  See after visit summary for information related to follow-up care and any pertinent home health orders.      Mobility at time of Discharge:   Basic Mobility Inpatient Raw Score: 17  JH-HLM Goal: 5: Stand one or more mins  JH-HLM Achieved: 6: Walk 10 steps or more  HLM Goal achieved. Continue to encourage appropriate mobility.     Disposition:   Acute Rehab at [facility]    Planned Readmission:  none     Discharge Statement:  I spent 45  minutes discharging the patient. This time was spent on the day of discharge. I had direct contact with the patient on the day of discharge. Greater than 50% of the total time was spent examining patient, answering all patient questions, arranging and discussing plan of care with patient as well as directly providing post-discharge instructions.  Additional time then spent on discharge activities.    Discharge Medications:  See after visit summary for reconciled discharge medications provided to patient and/or family.      **Please Note: This note may have been constructed using a voice recognition system**

## 2024-09-03 NOTE — H&P
[facility]  H&P  Name: Augustus Coffman 68 y.o. male I MRN: [id number]  Unit/Bed#: -01 I Date of Admission: 9/3/2024   Date of Service: 9/3/2024 I Hospital Day: 0      Assessment & Plan   Diabetic ulcer of right foot (HCC)  Assessment & Plan  Lab Results   Component Value Date    HGBA1C 6.8 (H) 07/05/2024       Recent Labs     09/02/24 2033 09/03/24  0641 09/03/24  1020 09/03/24  1541   POCGLU 155* 144* 131 138       Blood Sugar Average: Last 72 hrs:  (P) 138    Follows with wound care as outpatient  Was seen by podiatry during recent hospitalization  Continue local wound care  Follow up with wound and podiatry    Acute pericarditis  Assessment & Plan  Had LUCRECIA 8/27  Per cardiology- no plans for drainage  Completed colchicine and prednisone on 8/27  Needs follow up LUCRECIA in 1 month    New onset a-fib (HCC)  Assessment & Plan  Newly diagnosed in hospital on 8/21/24  Seen by cardiology  Initially on amiodarone infusion- now on PO amiodarone  Also started on metoprolol 25 mg BID, uptitrate as BP allows  On eliquis for AC    Acute osteomyelitis of cervical spine (HCC)  Assessment & Plan  Seen by neurosurg  Ancef for 6 weeks per ID  Continue use of C-collar  Monitor neuro checks    FINA (acute kidney injury) on CKD stage 2([facility])  Assessment & Plan  FINA during hospitalization, creat peaked at 3.38. now down to 1.09  Was seen by nephrology  Had avilez which was subsequently removed  Avoid nephrotoxins  Monitor labs  Follow up with nephrology after discharge    Type 2 diabetes mellitus (HCC)  Assessment & Plan  Lab Results   Component Value Date    HGBA1C 6.8 (H) 07/05/2024       Recent Labs     09/02/24 2033 09/03/24  0641 09/03/24  1020 09/03/24  1541   POCGLU 155* 144* 131 138       Blood Sugar Average: Last 72 hrs:  (P) 138    Home regimen: metformin and jardiance  Here: continue metformin, jardiance not on formulary here in hospital.  Will order fingersticks with sliding scale  as well    * MSSA bacteremia  Assessment & Plan  Previously hospitalized for MSSA bacteremia on 7/28, thought to be from heel ulcer and completed a course of antibiotics and was discharged home  Per chart, he was complaining of neck pain during that admission  He was sent to ortho outpatient and had an MRI of his neck that reported osteo and discitis so he was sent to the ED for admission  He was admitted on 8/20 and was seen by ID  LUCRECIA showed no endocarditis  MRI showed discitis/osteo at C5-C6  ID recommending 6 weeks of IV ancef    Needs repeat limited LUCRECIA when c collar is discontinued  Needs CBC and CMP weekly while on antibiotics  Has PICC which can be discontinued when ancef complete  Follow up with ID              History of Present Illness:   Augustus Coffman is a 68 y.o. male with a PMH of MSSA bacteremia, DM, FINA, foot ulcer who presented to the [facility] due to abnormal MRI of C-spine.   He was admitted for MSSA bacteremia and completed a course of antibiotics. He had ongoing neck pain and an MRI was ordered that showed osteo and discitis at C5-6  Pain is tolerable. He states his appetite isn't good because food tastes metallic.      Review of Systems: A 10 point ROS was performed; negative except as noted above.       Social History:    Substance Use History:   Social History     Substance and Sexual Activity   Alcohol Use Never     Social History     Tobacco Use   Smoking Status Never   Smokeless Tobacco Never   Tobacco Comments    Never smoked but exposed to second hand smoke from birth until 1980's     Social History     Substance and Sexual Activity   Drug Use Never       Past Medical History:    Past Medical History:   Diagnosis Date    Arthritis 2010's    Occasionally flares up    Cancer (HCC) May 2021    Still investigating    Chronic kidney disease     Diabetes mellitus (HCC)     Follicular lymphoma (HCC)     GERD (gastroesophageal reflux disease) June 2021    Noticed  in an Endoscopy    Heart murmur May 2020    High cholesterol     Hypertension     Kidney stone 1980's    Have had since 1980's    Obesity Since Childhood         Review of Scheduled Meds:  Current Facility-Administered Medications   Medication Dose Route Frequency Provider Last Rate    acetaminophen  975 mg Oral Q8H PRN Tino Chambers MD      [START ON 9/4/2024] amiodarone  200 mg Oral Daily Mayte Sanchez, PA-C      apixaban  5 mg Oral BID Mayte Sanchez, PA-C      atorvastatin  80 mg Oral Daily With Dinner Mayte Sanchez, PA-C      ceFAZolin  2,000 mg Intravenous Q8H Mayte Sanchez, PA-C      Cholecalciferol  4,000 Units Oral Daily Mayte Sanchez, PA-DIANNE      docusate sodium  100 mg Oral BID Tino Chambers MD      insulin lispro  1-5 Units Subcutaneous HS Mayte Sanchez, PA-C      insulin lispro  1-6 Units Subcutaneous TID AC Mayte Sanchez, PA-C      metFORMIN  1,000 mg Oral BID With Meals Mayte EnricodebraROSANA hopkins-DIANNE      methocarbamol  750 mg Oral Q6H SRIDHAR Mayte Sanchez, PA-C      metoprolol tartrate  25 mg Oral Q12H [facility] Mayte Sanchez, PA-C      multivitamin stress formula  1 tablet Oral Daily Mayte Feldmankellie, PA-DIANNE      oxyCODONE  5 mg Oral Q4H PRN Tino Chambers MD      [START ON 9/4/2024] polyethylene glycol  17 g Oral Daily Mayte Sanchez, NITESH      senna  1 tablet Oral HS PRN Tino Chambers MD         Physical Exam:  Temp:  [97.3 °F (36.3 °C)-97.7 °F (36.5 °C)] 97.3 °F (36.3 °C)  HR:  [] 86  Resp:  [17-22] 17  BP: (108-126)/(67-71) 126/67  SpO2:  [93 %-99 %] 98 %    General:   Physical Exam  Vitals reviewed.   Constitutional:       General: He is not in acute distress.     Appearance: He is not ill-appearing or diaphoretic.   HENT:      Head: Normocephalic and atraumatic.      Nose: Nose normal.      Mouth/Throat:      Pharynx: Oropharynx is clear.   Cardiovascular:      Rate and Rhythm: Normal rate.   Pulmonary:      Effort: Pulmonary effort is normal. No respiratory distress.  "  Abdominal:      General: Bowel sounds are normal. There is no distension.      Palpations: Abdomen is soft.      Tenderness: There is no abdominal tenderness.   Musculoskeletal:      Comments: C-collar in place   Skin:     General: Skin is warm and dry.   Neurological:      Mental Status: He is alert and oriented to person, place, and time.          Laboratory:    Results from last 7 days   Lab Units 08/29/24  0414 08/28/24  0500   HEMOGLOBIN g/dL 11.5* 11.4*   HEMATOCRIT % 38.1 37.6   WBC Thousand/uL 15.12* 12.78*     Results from last 7 days   Lab Units 09/01/24  0636 08/31/24  0407 08/30/24  0539 08/29/24  0414 08/28/24  0000   BUN mg/dL 48* 65* 79*   < > 87*   SODIUM mmol/L 138 137 138   < > 138   POTASSIUM mmol/L 4.5 4.4 4.0   < > 3.5   CHLORIDE mmol/L 99 94* 94*   < > 93*   CREATININE mg/dL 1.09 1.21 1.40*   < > 1.83*   AST U/L  --   --   --   --  99*   ALT U/L  --   --   --   --  7    < > = values in this interval not displayed.            Wt Readings from Last 1 Encounters:   09/03/24 111 kg (245 lb 9.6 oz)     Estimated body mass index is 30.7 kg/m² as calculated from the following:    Height as of this encounter: 6' 3\" (1.905 m).    Weight as of this encounter: 111 kg (245 lb 9.6 oz).    Imaging:  No orders to display       Level 1 - Full Code    Counseling / Coordination of Care:   I have spent a total time of 50 minutes in caring for this patient on the day of the visit/encounter including Diagnostic results, Instructions for management, Impressions, Counseling / Coordination of care, Documenting in the medical record, Reviewing / ordering tests, medicine, procedures  , Obtaining or reviewing history  , and Communicating with other healthcare professionals .     ** Please Note: Fluency Direct voice to text software may have been used in the creation of this document. **        "

## 2024-09-03 NOTE — ASSESSMENT & PLAN NOTE
Lab Results   Component Value Date    HGBA1C 6.8 (H) 07/05/2024       Recent Labs     09/02/24  2033 09/03/24  0641 09/03/24  1020 09/03/24  1541   POCGLU 155* 144* 131 138       Blood Sugar Average: Last 72 hrs:  (P) 138    Follows with wound care as outpatient  Was seen by podiatry during recent hospitalization  Continue local wound care  Follow up with wound and podiatry

## 2024-09-03 NOTE — PROGRESS NOTES
"PHYSICAL MEDICINE AND REHABILITATION   PREADMISSION ASSESSMENT     Projected IGC and Rehabilitation Diagnoses:  Impairment of mobility, safety and Activities of Daily Living (ADLs) due to Other Disabling Impairments:  13  Other Disabling Impairments  Etiologic: MSSA bacteremia  Date of Onset: 8/20/24       PATIENT INFORMATION  Name: Augustus Coffman Phone #: [phone] (home)   Address: [address] [place] [address]  YOB: 1955 Age: 68 y.o. #   Marital Status: /Civil Union  Ethnicity: White  Employment Status: currently employed  Extended Emergency Contact Information  Primary Emergency Contact: Sri Coffman  Address: [address] [place] [address] [facility]  Home Phone: [phone]  Work Phone: [phone]  Mobile Phone: [phone]  Relation: Spouse  Secondary Emergency Contact: Sandi Coffman   [place]  Mobile Phone: [phone]  Relation: Daughter  Advance Directive: Level 1 Full Code (no ACP docs)    INSURANCE/COVERAGE:     Primary Payor: MEDICARE / Plan: MEDICARE A AND B / Product Type: Medicare A & B Fee for Service /   Secondary Payer: [employer]   ID# [id number]   Payer Contact:  Payer Contact:   Contact Phone:  Contact Phone:       MEDICARE #: [id number]  Medicare Days: 55/30/60  Medical Record #: [id number]    REFERRAL SOURCE:   Referring provider: Cari Sarabia MD  Referring facility: [facility]  Room: [place] [address]/[place] [address]  PCP: Gwen Lazo DO PCP phone number: [phone]    MEDICAL INFORMATION  HPI: As per PM&R completed by Dr. 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 Patient saw outpatient orthopedics who recommended MRI of C-spine which showed discitis/osteomyelitis at C5-6 with paraspinal phlegmon/abscess and he was readmitted and eventually transferred to [facility] due to septic shock, MSSA bacteremia, pericarditis and pericardial effusion. Neurosurgery consulted and did not feel patient had neuropathic compromise and recommended antibiotics per ID with cervical collar and follow-up x-rays in 4 weeks. ID recommending high-dose IV Ancef and eventual repeat of MRI. There was consideration for pericardiocentesis however follow-up imaging showed decreased size of pericardial effusion and this is now on hold. Patient will eventually need transesophageal echocardiogram to rule out endocarditis but this is on hold due to patient recent hypotension in the ICU. He was placed on colchicine and prednisone for pericarditis. Patient has required IV amnio as well as initiation of heparin drip for new onset A-fib with RVR.\"    Colchicine and Prednisone was completed on 8/27.  Patient is currently on oral Amiodarone 200mg, TID with meals.     Podiatry was consulted for chronic right foot ulcer which was felt to be source of bacteremia and was stable. He was placed on a surgical shoe and is currently WBAT.     TTE was completed without evidence of endocarditis.  LUCRECIA deferred by Cardiology and will be obtained in the future when the CS collar is discontinued.`    As per ID - patient to continue IV Cefazonin 2g every 8 hours and he is to complete 6 week course of IV Ancef with tentative end date of 10/2.  Then anticipate transition to oral antibiotic with Duricef on 10/3.  Patient currently with tunneled central line.  F/U in ID office 2 weeks after discharge.    He has been deemed " hemodynamically stable at this time. PT/OT therapies were consulted and continue to follow patient at this time. PM&R was consulted and are recommending inpatient acute rehab when medically stable. All involved medical disciplines feel/agree patient is medically ready for discharge at this time. Inpatient acute rehabilitation physician was consulted. Upon review of patient's case and correspondence with PT/OT therapies and PM&R services, ARC physician feels he will benefit and is a good candidate and appropriate for inpatient acute rehab at this time. He has demonstrated the willingness, desire and tolerance to participate in the required 3 hours or more of therapies per day.     Past Medical History:   Past Surgical History:   Allergies:     Past Medical History:   Diagnosis Date    Arthritis 2010's    Occasionally flares up    Cancer (HCC) May 2021    Still investigating    Chronic kidney disease     Diabetes mellitus (HCC)     Follicular lymphoma (HCC)     GERD (gastroesophageal reflux disease) June 2021    Noticed in an Endoscopy    Heart murmur May 2020    High cholesterol     Hypertension     Kidney stone 1980's    Have had since 1980's    Obesity Since Childhood    Past Surgical History:   Procedure Laterality Date    BUNIONECTOMY Right 11/24/2020    Procedure: RIGHT HAV CORRECTION,;  Surgeon: Niranjan Child DPM;  [facility];  Service: Podiatry    COLONOSCOPY      INCISION AND DRAINAGE OF WOUND Right 10/05/2016    Procedure: INCISION AND DRAINAGE (I&D) EXTREMITY;  Surgeon: Niranjan Child DPM;  [facility];  Service:     IR BIOPSY LYMPH NODE  07/08/2021    IR EMBOLIZATION (SPECIFY VESSEL OR SITE)  07/08/2021    IR PORT PLACEMENT  9/1/2022    IR TUNNELED CENTRAL LINE PLACEMENT  8/29/2024    PILONIDAL CYST EXCISION      SC CORRECTION HAMMERTOE Right 02/19/2019    Procedure: THIRD HAMMER TOE CORRECTION;  Surgeon: Niranjan Child DPM;  [facility];  Service: Podiatry    SC RMVL MATEO CTR VAD  W/SUBQ PORT/ CTR/PRPH INSJ N/A 3/14/2023    Procedure: REMOVAL VENOUS PORT (PORT-A-CATH)IR;  Surgeon: Avelino Vázquez DO;  [facility];  Service: Interventional Radiology    TOE OSTEOTOMY Right 03/14/2017    Procedure: HAMMERTOE CORRECTION R 2 ;  Surgeon: Niranjan Child DPM;  [facility];  Service:     TOE OSTEOTOMY Left 11/24/2020    Procedure: LEFT HT CORRECTION TOE;  Surgeon: Niranjan Child DPM;  [facility];  Service: Podiatry    TONSILLECTOMY  1963     Allergies   Allergen Reactions    Lisinopril Cough         Medical/functional conditions requiring inpatient rehabilitation: Impaired balance, ambulation and mobility and ADL self-care      Risk for medical/clinical complications: Risk for falls, Risk for skin breakdown secondary to decreased mobility, Risk for uncontrolled pain, Risk for hypo/hypertensive episodes    Comorbidities:  C5-6 Osteomyelitis / Discitis  Acute respiratory failure with hypoxia  Septic shock  Hypotension  MSSA bacteremia  Pericardial effusion  Pericarditis  Acute on chronic kidney injury  A-fib with RVR  Diabetic right foot ulcer  Diabetes mellitus 2  Diabetic peripheral neuropathy  Morbid obesity  B cell lymphoma    Surgeries in the last 100 days: N/A    CURRENT VITAL SIGNS:   Temp:  [97.4 °F (36.3 °C)-98.5 °F (36.9 °C)] 97.4 °F (36.3 °C)  HR:  [] 85  Resp:  [15-22] 22  BP: (107-111)/(68-71) 111/71   Intake/Output Summary (Last 24 hours) at 9/3/2024 1235  Last data filed at 9/3/2024 1221  Gross per 24 hour   Intake 1520 ml   Output 0 ml   Net 1520 ml        LABORATORY RESULTS:      Lab Results   Component Value Date    HGB 11.5 (L) 08/29/2024    HGB 12.2 10/06/2015    HCT 38.1 08/29/2024    HCT 36.8 10/06/2015    WBC 15.12 (H) 08/29/2024    WBC 7.50 10/06/2015     Lab Results   Component Value Date    BUN 48 (H) 09/01/2024    BUN 23 10/14/2020     10/06/2015    K 4.5 09/01/2024    K 4.4 10/14/2020    CL 99 09/01/2024     10/14/2020     GLUCOSE 217 (H) 07/08/2021    GLUCOSE 111 10/06/2015    CREATININE 1.09 09/01/2024    CREATININE 0.88 10/14/2020     Lab Results   Component Value Date    PROTIME 18.9 (H) 08/25/2024    PROTIME 14.3 (H) 10/06/2015    INR 1.56 (H) 08/25/2024    INR 1.14 10/06/2015        DIAGNOSTIC STUDIES:  MRI lumbar spine wo contrast    Result Date: 8/27/2024  Impression: No discitis osteomyelitis involving the thoracic spine. Minimal thoracic spondylosis, as described above. No discitis osteomyelitis involving the lumbar spine. Multilevel lumbar spondylosis, as described above, contributing to at most moderate canal stenosis, right greater than left lateral recess stenosis, moderate to severe right and mild left neural foraminal stenosis at L4-L5. Discitis osteomyelitis again demonstrated at C5-C6, on the localizer/sagittal vertebral counting images. Right pleural effusion. Workstation performed: [id number]     MRI thoracic spine wo contrast    Result Date: 8/27/2024  Impression: No discitis osteomyelitis involving the thoracic spine. Minimal thoracic spondylosis, as described above. No discitis osteomyelitis involving the lumbar spine. Multilevel lumbar spondylosis, as described above, contributing to at most moderate canal stenosis, right greater than left lateral recess stenosis, moderate to severe right and mild left neural foraminal stenosis at L4-L5. Discitis osteomyelitis again demonstrated at C5-C6, on the localizer/sagittal vertebral counting images. Right pleural effusion. Workstation performed: [id number]     CT chest abdomen pelvis wo contrast    Result Date: 8/26/2024  Impression: 1.  New moderate nonspecific pericardial effusion and small bilateral pleural effusions. No other definitive manifestations of acute infection-allowing for the limitations of noncontrast CT 2.  Complex exophytic right renal cystic lesion, incompletely assessed by noncontrast CT. Nonemergent follow-up gadolinium enhanced MRI of the abdomen is  recommended for further evaluation. 3.  Please refer to the report body for description of other incidental chronic and/or benign findings. Workstation performed: [id number]     XR spine cervical 2 or 3 vw injury    Result Date: 8/26/2024  Impression: Destruction of the C5 to space with collapse of the superior endplate of C6 consistent with discitis osteomyelitis which has progressed from the prior studies. Increased soft tissue swelling anterior to C5-6 level. Left central venous catheter terminating at the confluence of the innominate veins. Workstation performed: [id number]     XR chest portable ICU    Result Date: 8/23/2024  Impression: Interval placement of a left IJ approach central venous catheter with tip terminating in the upper SVC near the brachiocephalic junction. Revision is advised. * I personally telephoned this result to Isael Cortes MD  on 8/23/2024 6:40 PM. Workstation performed: [id number]       PRECAUTIONS/SPECIAL NEEDS:  Tobacco:   Social History     Tobacco Use   Smoking Status Never   Smokeless Tobacco Never   Tobacco Comments    Never smoked but exposed to second hand smoke from birth until 1980's   , Alcohol:    Social History     Substance and Sexual Activity   Alcohol Use Never   , Weight Bearing Precautions:  weight bearing as tolerated RLE in Surgical Shoe, Anticoagulation:   Eliquis , Blood Sugar Management: As per MD recommendations, Edema Management, Safety Concerns, Pain Management, Dietary Restrictions: CCD2 with Glucerna - Strawberry with lunch, Language Preference: English, and Spinal precautions, Fall precautions    MEDICATIONS:     Current Facility-Administered Medications:     acetaminophen (TYLENOL) tablet 975 mg, 975 mg, Oral, Q6H [facility], Ophelia Leon PA-C, 975 mg at 09/03/24 0938    amiodarone tablet 200 mg, 200 mg, Oral, BID With Meals, [place] Levi-DIANNE, 200 mg at 09/03/24 0837    apixaban (ELIQUIS) tablet 5 mg, 5 mg, Oral, BID, Ophelia Leon PA-C, 5 mg at  09/03/24 0837    calcium carbonate (TUMS) chewable tablet 1,000 mg, 1,000 mg, Oral, Daily PRN, Ophelia Leon PA-C    ceFAZolin (ANCEF) IVPB (premix in dextrose) 2,000 mg 50 mL, 2,000 mg, Intravenous, Q8H, Tom Andre MD, Last Rate: 100 mL/hr at 09/03/24 0536, 2,000 mg at 09/03/24 0536    chlorhexidine (PERIDEX) 0.12 % oral rinse 15 mL, 15 mL, Mouth/Throat, Q12H SRIDHAR, Ophelia Leon PA-C, 15 mL at 09/03/24 0837    insulin lispro (HumALOG/ADMELOG) 100 units/mL subcutaneous injection 1-5 Units, 1-5 Units, Subcutaneous, TID AC, 2 Units at 09/02/24 1725 **AND** Fingerstick Glucose (POCT), , , TID AC, Ophelia Leon PA-C    insulin lispro (HumALOG/ADMELOG) 100 units/mL subcutaneous injection 1-5 Units, 1-5 Units, Subcutaneous, HS, Ophelia Leon PA-C, 1 Units at 09/02/24 2140    methocarbamol (ROBAXIN) tablet 750 mg, 750 mg, Oral, Q6H SRIDHAR, Ophelia Leon PA-C, 750 mg at 09/03/24 0837    metoprolol tartrate (LOPRESSOR) tablet 25 mg, 25 mg, Oral, Q12H SRIDHAR, Ophelia Leon PA-C, 25 mg at 09/03/24 0837    ondansetron (ZOFRAN) injection 4 mg, 4 mg, Intravenous, Q6H PRN, Ophelia Leon PA-C, 4 mg at 08/29/24 2051    oxyCODONE (ROXICODONE) immediate release tablet 10 mg, 10 mg, Oral, Q4H PRN, Ophelia Leon PA-C, 10 mg at 08/30/24 0539    oxyCODONE (ROXICODONE) IR tablet 5 mg, 5 mg, Oral, Q4H PRN, Ophelia Leon PA-C, 5 mg at 08/29/24 2334    polyethylene glycol (MIRALAX) packet 17 g, 17 g, Oral, Daily, Ophelia Leon PA-C, 17 g at 09/02/24 0851    senna-docusate sodium (SENOKOT S) 8.6-50 mg per tablet 2 tablet, 2 tablet, Oral, BID, Ophelia Leon PA-C, 2 tablet at 09/02/24 1722    SKIN INTEGRITY:   no rashes, no erythema, no peripheral edema    PRIOR LEVEL OF FUNCTION:  He lives in a(n) single family home  Augustus Coffman is  and lives with their spouse.  Self Care: Independent, Indoor Mobility: Independent, Stairs (in/outdoor): Independent, and Cognition: Independent    FALLS IN THE LAST 6 MONTHS: 0    HOME ENVIRONMENT:  The  living area: Two level;Bed/bath upstairs;Performs ADLs on one level  (split level/ 10 LARY and 10 steps to second floor bedroom)  The patient will not have 24 hour supervision/physical assistance available upon discharge.    PREVIOUS DME:  Equipment in home (previous DME): Rolling Walker and Crutches    FUNCTIONAL STATUS:  Physical Therapy Occupational Therapy Speech Therapy    As per PT 9/1/24:    Subjective   Subjective Pt encountered seated in recliner, pleasant and agreeable to treatment.  Reports feeling better each day, but continues to have ongoing neck pain with mobiilty.  States he has been able to walk to & from bathroom in room [facility], and walked with family in hallway yesterday without need for seated rest.  Pt motivated to participate in PT & is eager to d/c hospital to begin rehab.   Transfers   Sit to Stand 5  Supervision   Additional items Assist x 1   Stand to Sit 4  Minimal assistance   Additional items Assist x 1;Armrests;Increased time required   Ambulation/Elevation   Gait pattern Short stride;Foward flexed;Inconsistent marie;Shuffling;Decreased foot clearance;Improper Weight shift   Gait Assistance 4  Minimal assist   Additional items Assist x 1   Assistive Device Rolling walker   Distance 70' x 2, seated rest, then 90' x 2   Balance   Static Sitting Fair   Static Standing Fair -   Ambulatory Fair -   Activity Tolerance   Activity Tolerance Patient tolerated treatment well   Nurse Made Aware ANDERSON Tapia   Assessment   Prognosis Good   Problem List Decreased strength;Decreased endurance;Impaired balance;Decreased mobility;Pain   Assessment pt demonstrated miproved functional endurance today, ambulating repeated household distances with use of RW & no LOB or complaints of excessive fatigue.  Did so on room air with stable vitals WNL.  Seated rest taken to allow this PTA to adjust RW for height & comfort as well as instruct pt in postural extension at rest along with scapular retraction  in effor to reduce kypotic posture while ambualtiong that will improve his environmental awareness & reduce reliance on RW.  Pt will likely require further interventions to addres upper thoracic & cervical spine posture as he continues to recover.  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       As per OT 9/1/24:    ADL   Where Assessed Sitting at sink   Grooming Assistance 3  Moderate Assistance  (mod asst in sitting  for c collar management and to change  pads/ wash neck/ shave. sba for oral care in stance sinkside)   UB Bathing Assistance 4  Minimal Assistance   LB Bathing Assistance 3  Moderate Assistance   UB Dressing Assistance 3  Moderate Assistance   LB Dressing Assistance 2  Maximal Assistance   Toileting Assistance  4  Minimal Assistance   Functional Standing Tolerance   Time 5 min sinkside during oral care. pt did not use assistive device during grooming / toileting   Transfers   Sit to Stand 5  Supervision   Stand to Sit 5  Supervision   Additional Comments pt walked to from bathroom without a.d. per his request. pt with guarding and seeks unilateral support at times   Functional Mobility   Functional Mobility 4  Minimal assistance   Additional items    (no device)   Cognition   Overall Cognitive Status WFL   Activity Tolerance   Activity Tolerance Patient tolerated treatment well   Assessment   Assessment pt participated in am ot session and was seen focusing on  adls and c collar management/ grooming / shaving. pt requires mod to max asst lb adls sba to mod asst grooming and ub care. pt requires mod asst for clothing management during toileting. pt did not use a.d. pta and does not wish to use one post rehab. he needs to function independently as his wife is busy with own schedule as  times. pt currently needs mod/max asst for c collar management.  pt is motivated  and cooperative this session. pt with reports of moderate fatigue after lengthy pt then ot sessions     N/A     CARE SCORES:  Self Care:  Eatin: Supervision or touching  assistance  Oral hygiene: 03: Partial/moderate assistance  Toilet hygiene: 02: Substantial/maximal assistance  Shower/bathing self: 03: Partial/moderate assistance  Upper body dressin: Partial/moderate assistance  Lower body dressin: Substantial/maximal assistance  Putting on/taking off footwear: 02: Substantial/maximal assistance  Transfers:  Roll left and right: 09: Not applicable  Sit to lyin: Not applicable  Lying to sitting on side of bed: 09: Not applicable  Sit to stand: 04: Supervision or touching  assistance  Chair/bed to chair transfer: 04: Supervision or touching  assistance  Toilet transfer: 04: Supervision or touching  assistance  Mobility:  Walk 10 ft: 04: Supervision or touching  assistance  Walk 50 ft with two turns: 09: Not applicable  Walk 150ft: 04: Supervision or touching  assistance    CURRENT GAP IN FUNCTION  Prior to Admission: Functional Status: Patient was independent with mobility/ambulation, transfers, ADL's, IADL's.    Expected functional outcomes: It is expected that with skilled acute rehabilitation services the patient will progress to Independent for self care and Independent for mobility     Estimated length of stay: 10 to 14 days    Anticipated Post-Discharge Disposition/Treatment  Disposition: Return to previous home/apartment.  Outpatient Services: Physical Therapy (PT) and Occupational Therapy (OT)    BARRIERS TO DISCHARGE  Weakness, Pain, Balance Difficulty, Fatigue, Home Accessibility, Caregiver Accessibility, and Equipment Needs    INTERVENTIONS FOR DISCHARGE  Adaptive equipment, Patient/Family/Caregiver Education, Community Resources, Arrange DME needs, Therapy exercises, and Energy conservation education     REQUIRED THERAPY:  Patient will require PT and OT 90 minutes each per day, five  "days per week to achieve rehab goals.     REQUIRED FUNCTIONAL AND MEDICAL MANAGEMENT FOR INPATIENT REHABILITATION:  Skin:  Monitor skin for breakdown, Pain Management: Overall pain is moderately controlled, Deep Vein Thrombosis (DVT) Prophylaxis:  Per MD orders, Diabetes Management: continue sliding scale insulin, patient to do finger sticks as ordered, SLIM to continue to manage diabetes, further internal medicine management of additional medical conditions while on ARC, PT/OT intervention, patient/family education and training, and any needed consults PRN.    RECOMMENDED LEVEL OF CARE:   As per PM&R completed by Dr. 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 Patient saw outpatient orthopedics who recommended MRI of C-spine which showed discitis/osteomyelitis at C5-6 with paraspinal phlegmon/abscess and he was readmitted and eventually transferred to [facility] due to septic shock, MSSA bacteremia, pericarditis and pericardial effusion. Neurosurgery consulted and did not feel patient had neuropathic compromise and recommended antibiotics per ID with cervical collar and follow-up x-rays in 4 weeks. ID recommending high-dose IV Ancef and eventual repeat of MRI. There was consideration for pericardiocentesis however follow-up imaging showed decreased size of pericardial effusion and this is now on hold. Patient will eventually need transesophageal echocardiogram to rule out endocarditis but this is on hold due to patient recent " "hypotension in the ICU. He was placed on colchicine and prednisone for pericarditis. Patient has required IV amnio as well as initiation of heparin drip for new onset A-fib with RVR.\"    Colchicine and Prednisone was completed on 8/27.  Patient is currently on oral Amiodarone 200mg, TID with meals.     Podiatry was consulted for chronic right foot ulcer which was felt to be source of bacteremia and was stable. He was placed on a surgical shoe and is currently WBAT.     TTE was completed without evidence of endocarditis.  LUCRECIA deferred by Cardiology and will be obtained in the future when the CS collar is discontinued.`    As per ID - patient to continue IV Cefazonin 2g every 8 hours and he is to complete 6 week course of IV Ancef with tentative end date of 10/2.  Then anticipate transition to oral antibiotic with Duricef on 10/3.  Patient currently with tunneled central line.  F/U in ID office 2 weeks after discharge.    Prior to admission / hospital stay patient was independent with ADLs, functional mobility and IADLs. Currently with PT therapies: S/Min A with transfers and Min A with RW with ambulation. Currently with OT therapies: Mod A grooming, Min A UB bathing, Mod A LB bathing, Mod A UB dressing, Max A LB dressing and Min A toileting. Nursing is being recommended for medication distribution and management, bowel and bladder management and educational purposes, internal medicine to continue to monitor and manage medical conditions, PM&R to maximize function and provide medical oversight, and inpatient rehab to maximize self-care, mobility, strength and endurance upon discharge to home.  "

## 2024-09-03 NOTE — NURSING NOTE
This RN called report to [facility] for discharge and patient handoff. This RN gave verbal report to Alicia Dexter RN in [facility].     Patient to leave IP unit and transfer to [facility] with current PICC line per order for IV abx therapy. The patient's belongings were collected and transferred to patient's room in 973.     Patient left IP unit with transporter. If needed, RN can call back this RN at [phone] with any further questions.

## 2024-09-03 NOTE — ASSESSMENT & PLAN NOTE
Home: amlodipine 10 mg, losartan 100 mg, Toprol 100 daily  Here: lopressor 25 BID, normotensive, which he will go home on    Now with hypotension but asymptomatic  Lopressor held for parameters, IM to adjust  Robaxin made PRN  Monitor for symptoms

## 2024-09-03 NOTE — ASSESSMENT & PLAN NOTE
Acute elevation in creatinine noted from baseline of 1-1.2. to ~3.4 with hyperkalemia  In setting of septic shock  Last Cr 1.09 on 9/5  Monitor intermittently

## 2024-09-03 NOTE — TELEPHONE ENCOUNTER
----- Message from Sera Flores MD sent at 9/1/2024  9:51 AM EDT -----  Regarding: jacklyn  Please schedule the patient for hospital discharge follow-up for acute kidney injury to be seen in 6 weeks.  Please send the patient orders for renal function panel on 9/11/2024 and then again prior to the visit.  Patient usually follows up with Dr. PERRY Heath at [place] as an outpatient.    Thanks  sera

## 2024-09-03 NOTE — ASSESSMENT & PLAN NOTE
Wt Readings from Last 3 Encounters:   09/04/24 113 kg (249 lb 2 oz)   09/03/24 111 kg (244 lb 8 oz)   08/29/24 112 kg (248 lb)     See pericarditis for full cardiac plan  Continue beta blocker and atorvastatin  Not on diuretic or ACE/ARB  Adjustment of regimen as per IM  Outpatient f/u with Cards

## 2024-09-03 NOTE — ASSESSMENT & PLAN NOTE
Newly diagnosed in hospital on 8/21/24  Seen by cardiology  Initially on amiodarone infusion- now on PO amiodarone  Also started on metoprolol 25 mg BID, uptitrate as BP allows  On eliquis for AC

## 2024-09-04 VITALS
WEIGHT: 244.5 LBS | TEMPERATURE: 98.4 F | DIASTOLIC BLOOD PRESSURE: 117 MMHG | RESPIRATION RATE: 20 BRPM | HEIGHT: 75 IN | BODY MASS INDEX: 30.4 KG/M2 | HEART RATE: 85 BPM | OXYGEN SATURATION: 97 % | SYSTOLIC BLOOD PRESSURE: 181 MMHG

## 2024-09-04 DIAGNOSIS — N18.31 STAGE 3A CHRONIC KIDNEY DISEASE (HCC): Primary | ICD-10-CM

## 2024-09-04 PROBLEM — L89.306 PRESSURE INJURY OF DEEP TISSUE OF BUTTOCK: Status: ACTIVE | Noted: 2024-09-04

## 2024-09-04 LAB
GLUCOSE SERPL-MCNC: 111 MG/DL (ref 65–140)
GLUCOSE SERPL-MCNC: 120 MG/DL (ref 65–140)
GLUCOSE SERPL-MCNC: 133 MG/DL (ref 65–140)
GLUCOSE SERPL-MCNC: 148 MG/DL (ref 65–140)

## 2024-09-04 PROCEDURE — 82948 REAGENT STRIP/BLOOD GLUCOSE: CPT

## 2024-09-04 PROCEDURE — 97110 THERAPEUTIC EXERCISES: CPT

## 2024-09-04 PROCEDURE — 97162 PT EVAL MOD COMPLEX 30 MIN: CPT

## 2024-09-04 PROCEDURE — 99223 1ST HOSP IP/OBS HIGH 75: CPT | Performed by: INTERNAL MEDICINE

## 2024-09-04 PROCEDURE — 99233 SBSQ HOSP IP/OBS HIGH 50: CPT | Performed by: PHYSICAL MEDICINE & REHABILITATION

## 2024-09-04 PROCEDURE — 99231 SBSQ HOSP IP/OBS SF/LOW 25: CPT | Performed by: PHYSICIAN ASSISTANT

## 2024-09-04 PROCEDURE — 97166 OT EVAL MOD COMPLEX 45 MIN: CPT

## 2024-09-04 PROCEDURE — 97530 THERAPEUTIC ACTIVITIES: CPT

## 2024-09-04 PROCEDURE — 97535 SELF CARE MNGMENT TRAINING: CPT

## 2024-09-04 RX ORDER — SENNOSIDES 8.6 MG
2 TABLET ORAL
Status: DISCONTINUED | OUTPATIENT
Start: 2024-09-04 | End: 2024-09-09 | Stop reason: HOSPADM

## 2024-09-04 RX ORDER — LANOLIN ALCOHOL/MO/W.PET/CERES
3 CREAM (GRAM) TOPICAL
Status: DISCONTINUED | OUTPATIENT
Start: 2024-09-04 | End: 2024-09-05

## 2024-09-04 RX ORDER — BISACODYL 10 MG
10 SUPPOSITORY, RECTAL RECTAL DAILY PRN
Status: DISCONTINUED | OUTPATIENT
Start: 2024-09-04 | End: 2024-09-09

## 2024-09-04 RX ORDER — LACTULOSE 10 G/15ML
20 SOLUTION ORAL ONCE
Status: COMPLETED | OUTPATIENT
Start: 2024-09-04 | End: 2024-09-04

## 2024-09-04 RX ADMIN — ATORVASTATIN CALCIUM 80 MG: 80 TABLET, FILM COATED ORAL at 16:28

## 2024-09-04 RX ADMIN — CEFAZOLIN SODIUM 2000 MG: 2 SOLUTION INTRAVENOUS at 05:44

## 2024-09-04 RX ADMIN — SENNOSIDES 17.2 MG: 8.6 TABLET, FILM COATED ORAL at 21:17

## 2024-09-04 RX ADMIN — Medication 3 MG: at 22:15

## 2024-09-04 RX ADMIN — DOCUSATE SODIUM 100 MG: 100 CAPSULE, LIQUID FILLED ORAL at 18:15

## 2024-09-04 RX ADMIN — METHOCARBAMOL 750 MG: 750 TABLET ORAL at 05:44

## 2024-09-04 RX ADMIN — DOCUSATE SODIUM 100 MG: 100 CAPSULE, LIQUID FILLED ORAL at 08:25

## 2024-09-04 RX ADMIN — AMIODARONE HYDROCHLORIDE 200 MG: 200 TABLET ORAL at 08:25

## 2024-09-04 RX ADMIN — B-COMPLEX W/ C & FOLIC ACID TAB 1 TABLET: TAB at 08:25

## 2024-09-04 RX ADMIN — METOPROLOL TARTRATE 25 MG: 25 TABLET, FILM COATED ORAL at 08:25

## 2024-09-04 RX ADMIN — Medication 4000 UNITS: at 08:25

## 2024-09-04 RX ADMIN — APIXABAN 5 MG: 5 TABLET, FILM COATED ORAL at 18:14

## 2024-09-04 RX ADMIN — APIXABAN 5 MG: 5 TABLET, FILM COATED ORAL at 08:25

## 2024-09-04 RX ADMIN — LACTULOSE 20 G: 20 SOLUTION ORAL at 08:25

## 2024-09-04 RX ADMIN — METFORMIN HYDROCHLORIDE 1000 MG: 500 TABLET ORAL at 07:26

## 2024-09-04 RX ADMIN — POLYETHYLENE GLYCOL 3350 17 G: 17 POWDER, FOR SOLUTION ORAL at 08:25

## 2024-09-04 RX ADMIN — CEFAZOLIN SODIUM 2000 MG: 2 SOLUTION INTRAVENOUS at 21:18

## 2024-09-04 RX ADMIN — METFORMIN HYDROCHLORIDE 1000 MG: 500 TABLET ORAL at 16:28

## 2024-09-04 RX ADMIN — METOPROLOL TARTRATE 25 MG: 25 TABLET, FILM COATED ORAL at 21:17

## 2024-09-04 RX ADMIN — METHOCARBAMOL 750 MG: 750 TABLET ORAL at 23:31

## 2024-09-04 RX ADMIN — METHOCARBAMOL 750 MG: 750 TABLET ORAL at 11:51

## 2024-09-04 RX ADMIN — METHOCARBAMOL 750 MG: 750 TABLET ORAL at 18:14

## 2024-09-04 RX ADMIN — CEFAZOLIN SODIUM 2000 MG: 2 SOLUTION INTRAVENOUS at 12:10

## 2024-09-04 NOTE — PCC NURSING
68 y.o male who presented with MSSA bacteremia on 7/28 which had been treated with 7 days of antibiotics likely attributed to R diabetic foot ulcer. Cervical discitis present at C5-6 without evidence of endocarditis. Plan for ancef for 6wks via PICC line.  Had developed afib with RVR and had pressors added. LUCRECIA to be performed as an outpatient when c-collar is discontinued. On metoprolol for HTN. Home Norvasc and losartan on hold. Amiodarone PO for afib. TTE per cadiology to be performed in a month.     This week we will encourage independence with ADLs.  We will monitor labs and vital signs.  We will educate pt/family about repositioning to prevent skin breakdown.  We will assist w repositioning and perform routine skin checks.  We will monitor for adequate pain control.  We will monitor for constipation and medicate pt as ordered.  We will increase safety awareness and keep pt free from falls.

## 2024-09-04 NOTE — PROGRESS NOTES
OT Initial Evaluation       09/04/24 1300   Patient Data   Rehab Impairment Impairment of mobility, safety and Activities of Daily Living (ADLs) due to Other Disabling Impairments:  13  Other Disabling Impairments   Etiologic Diagnosis MSSA bacteremia   Date of Onset 08/20/24   Support System   Name Kanya   Relationship wife   Able to provide 24 hour supervision No   Able to provide physical help? Yes   Multiple Support Systems Yes   Support System 2   Name 2 Rupal   Relationship 2 daughter   Able to provide 24 hour supervision 2 No   Able to provide physical help? (2) Yes   Support System 3   Name 3 Ladarius   Relationship 3 niece   Able to provide 24 hour supervision 3 No   Able to provide physical help? (3) Yes   Home Setup   Type of Home Split Level   Method of Entry Stairs   Number of Stairs 10   Number of Stairs in Home 10  (to both floors)   First Floor Bathroom Full;Shower;Built-in shower seat  (bottom floor)   First Floor Bathroom Accessibility Built in shower seat   Second Floor Bathroom Full;Shower;Built-in shower seat  (top floor)   Second Floor Bathroom Accessibility Built in shower seat   First Floor Setup Available Yes  (could potentially live on primary floor that has 0 LARY but he would be sleeping in the living room)   Home Modification Comment pending progress   Available Equipment Roller Walker  (knee scooter)   Baseline Information   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 Independent - Patient completed the activities by him/herself, with or without an  assistive device, with no assistance from a helper.   Indoor-Mobility (Ambulation) 3. Independent - Patient completed the activities by him/herself, with or without an assistive device, with no assistance from a helper.   Stairs 3. Independent - Patient completed the activities by him/herself, with or without an assistive device, with no assistance from a helper.   Functional Cognition 3. Independent - Patient completed the activities by him/herself, with or without an assistive device, with no assistance from a helper.   Prior Assistance Needed for   (N/A)   Prior Device Used Z. None of the above  (due to wound on ball of R foot reports using knee scooter and cruches as needed)   Falls in the Last Year   Number of falls in the past 12 months 0   Patient Preference   Nickname (Patient Preference) Wilmer   Psychosocial   Psychosocial (WDL) WDL   Restrictions/Precautions   Precautions Fall Risk;Pain;Spinal precautions   Weight Bearing Restrictions Yes   RUE Weight Bearing Per Order Other  (5 lb WBing limit)   LUE Weight Bearing Per Order Other  (5 lb WBing limit)   RLE Weight Bearing Per Order WBAT  (through R heel with surgical shoe)   Braces or Orthoses C/S Collar   Pain Assessment   Pain Assessment Tool 0-10   Pain Score 5   Pain Location/Orientation Orientation: Bilateral;Location: Shoulder   Hospital Pain Intervention(s) Emotional support   Eating Assessment   Type of Assistance Needed Independent   Physical Assistance Level No physical assistance   Eating CARE Score 6   Oral Hygiene   Type of Assistance Needed Supervision   Physical Assistance Level No physical assistance   Comment in stance at sink   Oral Hygiene CARE Score 4   Tub/Shower Transfer   Findings has walk-in shower at home. plan to trial shower transfer tomorrow.   Shower/Bathe Self   Type of Assistance Needed Physical assistance   Physical Assistance Level 26%-50%   Comment would req A for distal LE if completing at baseline level (in stance). pt  completed SB today requiring CS. plan to complete full shower tomorrow to more formally assess his abilities. pt does have a built in seat at home so sitting during showers is a possibility   Shower/Bathe Self CARE Score 3   Bathing   Assessed Bath Style Sponge Bath   Anticipated D/C Bath Style Shower   Dressing/Undressing Clothing   Type of Assistance Needed Physical assistance   Physical Assistance Level 51%-75%   Comment inc time spent reviewing how to don/doff C/S collar. pt completed C/S collar mgmt seated in front of mirror to allow for visual feedback to ensure safe technique. pt completed x5 with min A and mod vc for technique. pt able to don/doff shirt with min A. provided pt with C/S collar handout to educate on parts of collar, how to safely don and doff and information on changing pads. OT also provided pt with pad changing log to ensure they are being changed daily by pt or staff.   Upper Body Dressing CARE Score 2   Type of Assistance Needed Incidental touching   Physical Assistance Level No physical assistance   Comment seated via cross leg technique to thread, CG in stance as pt donned over hips   Lower Body Dressing CARE Score 4   Putting On/Taking Off Footwear   Type of Assistance Needed Supervision   Physical Assistance Level No physical assistance   Comment mod vc to maintain C/S precautions - able to complete via cross leg technique. pt has surgical shoe on RLE that was dirty and the treading on the bottom of the shoe had worn out so OT received a new one and assisted the pt with setting it up   Putting On/Taking Off Footwear CARE Score 4   Toileting Hygiene   Type of Assistance Needed Supervision   Physical Assistance Level No physical assistance   Comment CM/hygiene seated on standard toilet height   Toileting Hygiene CARE Score 4   Toilet Transfer   Type of Assistance Needed Supervision   Physical Assistance Level No physical assistance   Comment no AD; seated on standard toilet height    Toilet Transfer CARE Score 4   Transfer Bed/Chair/Wheelchair   Type of Assistance Needed Supervision   Physical Assistance Level No physical assistance   Comment no AD   Chair/Bed-to-Chair Transfer CARE Score 4   Roll Left and Right   Comment does not sleep in a bed at baseline. has been sleeping in recliner for 4 yrs   Reason if not Attempted Activity not applicable   Roll Left and Right CARE Score 9   Sit to Lying   Comment does not sleep in a bed at baseline. has been sleeping in recliner for 4 yrs   Reason if not Attempted Activity not applicable   Sit to Lying CARE Score 9   Lying to Sitting on Side of Bed   Comment does not sleep in a bed at baseline. has been sleeping in recliner for 4 yrs   Reason if not Attempted Activity not applicable   Lying to Sitting on Side of Bed CARE Score 9   Sit to Stand   Type of Assistance Needed Supervision   Physical Assistance Level No physical assistance   Comment no AD   Sit to Stand CARE Score 4   Comprehension   QI: Comprehension 4. Undestands: Clear comprehension without cues or repetitions   Expression   QI: Expression 4. Express complex messages without difficulty and with speech that is clear and easy to understan   Problem Solving   Complex Manages finances;Manages discharge planning;Manages return to work;Manages medications  (IND with use of pill box (sets up monthly))   Routine Manages call bell;Manges precautions;Manages ADL   RUE Assessment   RUE Assessment WFL   LUE Assessment   LUE Assessment WFL   Cognition   Overall Cognitive Status WFL   Arousal/Participation Alert;Responsive;Cooperative   Attention Within functional limits   Orientation Level Oriented X4   Memory Within functional limits   Following Commands Follows one step commands without difficulty   Comments no cognitive deficits or concerns noted   Vision   Vision Comments wears reading glasses   Discharge Information   Vocational Plan Return to work;Full Time   Barriers to Return to Vocation  "Transportation Issues;Strength;Endurance   Patient's Discharge Plan DC home Mod IND   Patient's Rehab Expectations \"I am ready to go when you say I am.\"   Barriers to Discharge Home Limited Family Support;Unsafe Home Setup;Decreased Strength;Decreased Endurance;Pain;Safety Considerations   Impressions Pt is a 68 y.o. male who developed worsening neck pain and periscapular pain was hospitalized from 7/28 to 8/3 for sepsis believed to be secondary to diabetic foot wound, MSSA bacteremia, acute on chronic kidney injury. Pt was dx with MSSA bacteremia and transferred to [facility] to receive skilled therapy services. Pt  has a past medical history of Arthritis, Cancer (HCC), Chronic kidney disease, Diabetes mellitus (HCC), Follicular lymphoma (HCC), GERD (gastroesophageal reflux disease), Heart murmur, High cholesterol, Hypertension, Kidney stone, and Obesity. Current precautions include C/S precautions, C/S collar on at all times, fall risk and pain. See flowsheet for details of pts home setup, PLOF and current functional status. Pts impairments include pain, endurance, activity tolerance, and C/S precautions/collar mgmt . These impairments along with  limited caregiver support and steps to enter causes the inability to safely complete A/IADLs including Bathing, UB dressing, LB dressing, Toileting, Toilet transfer, brace mgmt and IADL Management. Pt presents with excellent rehab potential with motivation to participate and achieve goal of DC home. Pt is unsafe to DC home at this time, recommending 1 week to achieve independent with assistive device level goals with no DME/AD and C/S collar . Plan to achieve stated goals with interventions focused on ADL Retraining , IADL training , Functional Transfers, Standing tolerance, Standing balance , DME training/education, Family training/education, Energy conservation training/education, healthy coping education, Leisure and social pursuits, and community re-integration. Family " training with the wife has been setup for Friday at 8am with OT only to educate on C/S collar mgmt, pad changing and C/S precautions. During that FT, also plan to assess pts wife to ambulate with pt in the hallways w/o an AD. Spoke with ANDERSON Vargas - wife will not require RN FT as IV ABX supplies will be different with home nsg. Pt has been given Dist Sup IRP. Pt has signed pt privilege plan and is agreeable to call prior to transferring ambulating during daytime to inform nsg staff of the transfer and to call for Close SUP at night. ANDERSON Vargas also aware of the Dist Sup IRP. Pending how the pt performs at this level and how FT goes on Friday - plan to progress to IRP this weekend as pt will be a short LOS.   OT Therapy Minutes   OT Time In 1300   OT Time Out 1430   OT Total Time (minutes) 90   OT Mode of treatment - Individual (minutes) 90   OT Mode of treatment - Concurrent (minutes) 0   OT Mode of treatment - Group (minutes) 0   OT Mode of treatment - Co-treat (minutes) 0   OT Mode of Treatment - Total time(minutes) 90 minutes   OT Cumulative Minutes 90   Cumulative Minutes   Cumulative therapy minutes 90

## 2024-09-04 NOTE — PROGRESS NOTES
[facility]  Progress Note  Name: Augustus Coffman I  MRN: [id number]  Unit/Bed#: -01 I Date of Admission: 9/3/2024   Date of Service: 9/4/2024 I Hospital Day: 1    Assessment & Plan   Diabetic ulcer of right foot (HCC)  Assessment & Plan  Lab Results   Component Value Date    HGBA1C 6.8 (H) 07/05/2024       Recent Labs     09/03/24  1020 09/03/24  1541 09/03/24  2110 09/04/24  0620   POCGLU 131 138 141* 120         Blood Sugar Average: Last 72 hrs:  (P) 133    Follows with wound care as outpatient  Was seen by podiatry during recent hospitalization  Continue local wound care  Follow up with wound and podiatry    Acute pericarditis  Assessment & Plan  Had LUCRECIA 8/27  Per cardiology- no plans for drainage  Completed colchicine and prednisone on 8/27  Needs follow up LUCRECIA in 1 month    New onset a-fib (HCC)  Assessment & Plan  Newly diagnosed in hospital on 8/21/24  Seen by cardiology  Initially on amiodarone infusion- now on PO amiodarone  Also started on metoprolol 25 mg BID, uptitrate as BP allows  On eliquis for AC    MSSA bacteremia  Assessment & Plan  Previously hospitalized for MSSA bacteremia on 7/28, thought to be from heel ulcer and completed a course of antibiotics and was discharged home  Per chart, he was complaining of neck pain during that admission  He was sent to ortho outpatient and had an MRI of his neck that reported osteo and discitis so he was sent to the ED for admission  He was admitted on 8/20 and was seen by ID  LUCRECIA showed no endocarditis  MRI showed discitis/osteo at C5-C6  ID recommending 6 weeks of IV ancef    Needs repeat limited LUCRECIA when c collar is discontinued  Needs CBC and CMP weekly while on antibiotics  Has PICC which can be discontinued when ancef complete  Seen ID today.- plan for ancef for 6 weeks thru 10/3 then possibly transition to PO antibiotics until SED rate and CRP improve    Transaminitis  Assessment & Plan  Seen on labs in  hospital  Was downtrending on most recent labs  Will recheck labs in AM    Acute osteomyelitis of cervical spine (HCC)  Assessment & Plan  Seen by neurosurg  Ancef for 6 weeks per ID  Continue use of C-collar  Monitor neuro checks    FINA (acute kidney injury) on CKD stage 2(HCC)  Assessment & Plan  FINA during hospitalization, creat peaked at 3.38. now down to 1.09  Was seen by nephrology  Had avilez which was subsequently removed  Avoid nephrotoxins  Monitor labs  Follow up with nephrology after discharge    Type 2 diabetes mellitus (HCC)  Assessment & Plan  Lab Results   Component Value Date    HGBA1C 6.8 (H) 07/05/2024       Recent Labs     09/03/24  1020 09/03/24  1541 09/03/24  2110 09/04/24  0620   POCGLU 131 138 141* 120         Blood Sugar Average: Last 72 hrs:  (P) 133    Home regimen: metformin and jardiance  Here: continue metformin, jardiance not on formulary here in hospital.  Will order fingersticks with sliding scale as well                 The above assessment and plan was reviewed and updated as determined by my evaluation of the patient on 9/4/2024.    Labs:   Results from last 7 days   Lab Units 08/29/24  0414   WBC Thousand/uL 15.12*   HEMOGLOBIN g/dL 11.5*   HEMATOCRIT % 38.1   PLATELETS Thousands/uL 415*     Results from last 7 days   Lab Units 09/01/24  0636 08/31/24  0407   SODIUM mmol/L 138 137   POTASSIUM mmol/L 4.5 4.4   CHLORIDE mmol/L 99 94*   CO2 mmol/L 31 33*   BUN mg/dL 48* 65*   CREATININE mg/dL 1.09 1.21   CALCIUM mg/dL 9.5 9.7             Results from last 7 days   Lab Units 09/04/24  0620 09/03/24  2110 09/03/24  1541   POC GLUCOSE mg/dl 120 141* 138       Imaging  No orders to display       Review of Scheduled Meds:  Current Facility-Administered Medications   Medication Dose Route Frequency Provider Last Rate    acetaminophen  975 mg Oral Q8H PRN Tino Chambers MD      amiodarone  200 mg Oral Daily Mayte Sanchez PA-C      apixaban  5 mg Oral BID Mayte Sanchez PA-C       atorvastatin  80 mg Oral Daily With Dinner Mayte Sanchez PA-C      bisacodyl  10 mg Rectal Daily PRN Ashley Depadua, MD      ceFAZolin  2,000 mg Intravenous Q8H TONY GriffithsC 2,000 mg (09/04/24 0544)    Cholecalciferol  4,000 Units Oral Daily Mayte Sanchez PA-C      docusate sodium  100 mg Oral BID Tino Chambers MD      insulin lispro  1-5 Units Subcutaneous HS Mayte Sanchez PA-C      insulin lispro  1-6 Units Subcutaneous TID AC Mayte Sanchez PA-C      metFORMIN  1,000 mg Oral BID With Meals Mayte Sanchez PA-C      methocarbamol  750 mg Oral Q6H SRIDHAR Mayte Sanchez PA-C      metoprolol tartrate  25 mg Oral Q12H SRIDHAR Mayte Sanchez PA-C      multivitamin stress formula  1 tablet Oral Daily Mayte Sanchez PA-C      ondansetron  4 mg Oral Q6H PRN Ashley Depadua, MD      oxyCODONE  5 mg Oral Q4H PRN Ashley Depadua, MD      polyethylene glycol  17 g Oral Daily Mayte Sanchez PA-C      senna  2 tablet Oral HS Ashley Depadua, MD         Subjective/ HPI: Patient seen and examined. Patients overnight issues or events were reviewed with nursing staff. New or overnight issues include the following:     Patient seen sitting comfortably in chair in c-collar. Pain controlled. Afebrile. No CP/SOB. States he has trouble sleeping because he can't get comfortable. Appetite good    ROS:   A 10 point ROS was performed; negative except as noted above.        *Labs /Radiology studies Reviewed  *Medications  reviewed and reconciled as needed  *Please refer to order section for additional ordered labs studies      Physical Examination:  Vitals:   Vitals:    09/03/24 1510 09/03/24 2050 09/04/24 0540 09/04/24 0825   BP: 126/67 101/65 131/74 141/74   BP Location: Left arm Left arm Left arm    Pulse: 86 95 86 100   Resp: 17 19 18    Temp: (!) 97.3 °F (36.3 °C) 97.5 °F (36.4 °C) 97.8 °F (36.6 °C)    TempSrc: Oral Oral Oral    SpO2: 98% 98% 97%    Weight:   113 kg (249 lb 2 oz)    Height:           General  Appearance: NAD; pleasant  HEENT: PERRLA, conjuctiva normal; mucous membranes moist; face symmetrical  Neck:  Supple  Lungs: clear bilaterally, normal respiratory effort, no retractions, expiratory effort normal, on room air  CV: regular rate   ABD: soft non tender, +BS x4  EXT: DP pulses intact, no lymphadenopathy, no edema. C-collar in place  Skin: normal turgor, normal texture, no rash  Psych: affect normal, mood normal  Neuro: AAOx3       The above physical exam was reviewed and updated as determined by my evaluation of the patient on 9/4/2024.    Invasive Devices       Central Venous Catheter Line  Duration             CVC Central Lines 08/29/24 Right Other (Comment) 5 days                       VTE Pharmacologic Prophylaxis: Eliquis  Code Status: Level 1 - Full Code  Current Length of Stay: 1 day(s)    Total floor / unit time spent today 20 minutes  Coordination of patient's care was performed in conjunction with consulting services. Time invested included review of patient's labs, vitals, and management of their comorbidities with continued monitoring, examination of patient as well as answering patient questions, documenting her findings and creating progress note in electronic medical record,  ordering appropriate diagnostic testing.       ** Please Note:  voice to text software may have been used in the creation of this document. Although proof errors in transcription or interpretation are a potential of such software**

## 2024-09-04 NOTE — ASSESSMENT & PLAN NOTE
Lab Results   Component Value Date    HGBA1C 6.8 (H) 07/05/2024       Recent Labs     09/03/24  1020 09/03/24  1541 09/03/24  2110 09/04/24  0620   POCGLU 131 138 141* 120         Blood Sugar Average: Last 72 hrs:  (P) 133    Follows with wound care as outpatient  Was seen by podiatry during recent hospitalization  Continue local wound care  Follow up with wound and podiatry

## 2024-09-04 NOTE — PROGRESS NOTES
Physical Medicine and Rehabilitation Progress Note  Augustus Coffman 68 y.o. male MRN: [id number]  Unit/Bed#: [facility] (formerly [facility]) 973-01 Encounter: [id number]    To Review: Mr. Coffman is a 69yo M with medical history of Diabetes with peripheral neuropathy and diabetic foot wound, HTN, CKD, who presented to [place] on 8/20 for outpatient MRI showing osteomyelitis and diskitis found on an outpatient basis. Recent hospitalization for MSSA bacteremia with R foot wound source treated with IV abx for 7 days. Started on IV abx with plan to treat 6 weeks through 10/2 with transition to Duricef until ESR/CRP normalize. Neurosurgery evaluated and recommended C-collar ATC  due to destruction of the C5 disc space and collapse of C6 SEP. Course c/b pericarditis and pericardial effusion. Did not require window or surgical intervention. Treated with colchicine/steroids. Course also c/b FINA in setting of septic shock which has resolved and Afib/flutter treated with heparin gtt and amio gtt now on oral amio. Admitted to the [facility] (formerly [facility]) on 9/3.     Chief Complaint: Feeling well    Interval History/Subjective: Poor sleep last night. [place] he couldn't get comfortable. Has been sleeping in a recliner at home. Staff put some padding on the side, and he wants to see if that helps him tonight rather than starting medications. No headaches or pain, CP, SOB, fevers, chills, N/V, abdominal pain, lightheadedness, dizziness. Continent of urine but last BM charted 8/29, but patient reports having a BM yesterday and a day ago.    ROS:  A 10 point review of systems was negative except for what is noted in the HPI.    Today's Changes:  Labs in the AM as per ID. Recs appreciated.   Monitor neuro exam - remains reassuring today  Local care for right foot ulcer. Podiatry did recommend trying to offload with heel weightbearing, but requires RW for this, and patient seems averse to this. Education on wound care and offloading.   On admission to [facility] (formerly [facility]), found to have evolving DTI  to bilateral sacro-buttocks. Wound care consulted. Appreciate local wound care recs. Offloading, specialty cushion.   Will plan to Team tomorrow.  Monitor bowel function.    Total visit time: 35 minutes, with more than 50% spent counseling/coordinating care. Counseling includes discussion with patient re: progress in therapies, functional issues observed by therapy staff, and discussion with patient regarding their current medical state and wellbeing. Coordination of patient's care was performed in conjunction with Internal Medicine service to monitor patient's labs, vitals, and management of their comorbidities.    Assessment/Plan:    MSSA bacteremia  Assessment & Plan  MSSA bacteremia  Patient with prior admission with MSSA bacteremia on 7/28 treated with 7 days of antibiotics  Etiology likely 2/2 right foot ulcer  TTE without evidence of endocarditis  Cleared Blood cultures 8/23    ID consulted, plan for ancef x 6 weeks (end date 10/2 through PICC)  Anticipate transition to oral antibiotic with Duricef on 10/3, to be continued until ESR/CRP improve  LUCRECIA deferred by cardiology, - will obtain in the future when c collar is discontinued  Check limited TTE in 1 month  PT/OT for 3-5 hours a day for 5-6 days/week    Pressure injury of deep tissue of buttock  Assessment & Plan  POA to the [facility]  Found to have bilateral sacro-buttock evolving DTI.  Wound care consulted - recs appreciated.  Specialty cushion  Regular Turns/offloading  Monitor and continue local wound care as per Wound care recs.    Constipation  Assessment & Plan  Last BM 9/2.   Will de-escalate to docusate BID and miralax daily  Monitor and adjust as appropriate.    Diabetic ulcer of right foot (HCC)  Assessment & Plan  Lab Results   Component Value Date    HGBA1C 6.8 (H) 07/05/2024       Recent Labs     09/03/24  0641 09/03/24  1020 09/03/24  1541 09/03/24  2110   POCGLU 144* 131 138 141*       Blood Sugar Average: Last 72 hrs:  (P) 138    Skin Care  Plan:  1-Right Foot Wound: Per Podiatry recommendations   2-Turn/reposition q2h or when medically stable for pressure re-distribution on skin .  3-Elevate heels to offload pressure.  4-Moisturize skin daily with skin nourishing cream  5-Ehob cushion in chair when out of bed.  6-Preventative Hydraguard to bilateral sacro-buttocks and heels BID and PRN. ]    Will consult podiatry to continue to follow.    Acute osteomyelitis of cervical spine (HCC)  Assessment & Plan  8/20 MRI C-Spine: Imaging findings suspicious for discitis osteomyelitis at C5-C6. 3 mm epidural phlegmon/small abscess is also suspected. There is moderate resultant central stenosis and mild mass effect on the cord  8/25 XR C-Spine Destruction of the C5 to space with collapse of the superior endplate of C6 consistent with discitis osteomyelitis which has progressed from the prior studies. Increased swelling anterior to C5-6.   8/27 MRI - Discitis osteomyelitis again demonstrated at C5-C6   8/29 MRI cervical spine with stable findings and no meningeal enhancement    Continue c-collar per NSGY  Recommending 6 weeks IV antibiotics as above and follow-up repeat upright x-rays ~9/25  Monitor neurologic checks     Acute pericarditis  Assessment & Plan  Presented with classic symptoms and with diffuse ST elevation on 8/22 EKG  8/22 Echo: Limited echo for assessment of endocarditis.  The patient's aortic valve is difficult to assess due to calcification but there is no new significant regurgitation.  The mitral valve has hyperechoic thickening at the tips consistent with most likely calcification, these were seen on the echocardiograms in July.  The tricuspid valve similarly was not well-visualized but no new regurgitation.  The pulmonary valve was not well-visualized. Off note patient has new moderate pericardial effusion without specific echocardiographic signs of tamponade  8/27 TTE - EF 50%, mod focal calcification mitral valve, moderate circumferential  pericardial effusion, fluid with fibrinous appearance, no sign of tamponade      Drain deferred due to no tamponade  No thoracic surgery indication.  LUCRECIA when c-collar removed in 6 weeks.  Repeat TTE in 4 weeks.  Completed Colchicine and Prednisone on 8/27  Close hemodynamic monitoring  Outpatient follow up with cardiology for monitoring of effusion    New onset a-fib ([facility])  Assessment & Plan  New onset POA with flutter and fibrillation 2:1 AV conduction  Amio gtt discontinued 8/31  CHADS-VASC score 3    Continue eliquis, lopressor, and amiodarone  Adjust rate control meds as able  Amiodarone to turn into 200 mg daily on 9/6 per cardiology recs    Transaminitis  Assessment & Plan  Mild elevation seen earlier in hospitalization  Has been on schedule tylenol  RUQ US unremarkable for pathology  Recheck tomorrow AM    Neck pain  Assessment & Plan  On Tylenol Q8hr PRN  Robaxin 750mg Q5hr scheduled  Oxycodone 5mg Q4hr PRN  Would not apply heat to this region.  May use other topicals  Monitor and manage as appropriate.    Chronic diastolic CHF (congestive heart failure) ([facility])  Assessment & Plan  Wt Readings from Last 3 Encounters:   09/03/24 111 kg (245 lb 9.6 oz)   09/03/24 111 kg (244 lb 8 oz)   08/29/24 112 kg (248 lb)     See pericarditis for full cardiac plan  Continue beta blocker and atorvastatin  Not on diuretic or ACE/ARB  Adjustment of regimen as per IM  Outpatient f/u with Cards          Ectatic thoracic aorta ([facility])  Assessment & Plan  Seen on incidental CT  Follows with cardiology  No weightbearing above 5 pounds in upper extremities per his outpatient doctor    Vitamin D deficiency  Assessment & Plan  Continue repletion  Normal level of 47 four months ago    FINA (acute kidney injury) on CKD stage 2([facility])  Assessment & Plan  Acute elevation in creatinine noted from baseline of 1-1.2. to ~3.4 with hyperkalemia  In setting of septic shock  Last Cr 1.09 on 9/1  Monitor intermittently     Acute blood loss  anemia  Assessment & Plan  Baseline Hgb 10-12  8/29 11.5  Monitor for s/s bleeding  Check cbc tomorrow    Type 2 diabetes mellitus (HCC)  Assessment & Plan  Lab Results   Component Value Date    HGBA1C 6.8 (H) 07/05/2024       Recent Labs     09/03/24  2110 09/04/24  0620 09/04/24  1145 09/04/24  1632   POCGLU 141* 120 133 111       Blood Sugar Average: Last 72 hrs:    On jardiance & metformin outpatient, held during FINA  Complicated by diabetic ulcer as noted to be likely source as above  Continue SSI per IM  Consider adding back orals given improved kidney function to baseline    Hypercholesteremia  Assessment & Plan  Continue statin    History of hypertension  Assessment & Plan  Home: amlodipine 10 mg, losartan 100 mg, Toprol 100 daily  Here: lopressor 25 BID, normotensive  Monitor and adjust as appropriate  Management as per IM        Health Maintenance  #Delirium/Sleep: At risk. Optimize sleep/wake, pain, bowel, bladder management. Avoid deliriogenic meds and physical restraint. Environmental/behavioral interventions.   #Bladder: Voiding and Continent  #Skin/Pressure Injury Prevention: Turn Q2hr in bed, with weight shifts [id number] in wheelchair. Float heels in bed.  #DVT Prophylaxis: Fully anticoagulated on eliquis.  #GI Prophylaxis: n/a  #Code Status: full code  #FEN: diabetic diet with glucerna  #Dispo: ELOS 1-2 weeks pending progress with therapy. Needs f/u with NSGY, ID, cardiology, cardiothoracic surgery, orthopedic surgery, wound care, podiatry, and PCP      Objective:    Functional Update: pending  PT  OT  SLP    Allergies per EMR    Physical Exam:  Temp:  [97.3 °F (36.3 °C)-98.4 °F (36.9 °C)] 97.8 °F (36.6 °C)  HR:  [] 86  Resp:  [17-20] 18  BP: (101-181)/() 131/74  Oxygen Therapy  SpO2: 97 %      Gen: No acute distress, Well-nourished, well-appearing.  HEENT: Moist mucus membranes, Normocephalic/Atraumatic  Cardiovascular: Regular rate, rhythm, S1/S2. Distal pulses palpable  Heme/Extr: No  edema  Pulmonary: Non-labored breathing. Lungs CTAB  : No avilez  GI: Soft, non-tender, non-distended. BS+  Integumentary: Skin is warm, dry.   Neuro: AAOx3,  Speech is intact. Appropriate to questioning.   Psych: Normal mood and affect.       Diagnostic Studies: reviewed, no new imaging  No results found.    Laboratory:  Reviewed   Results from last 7 days   Lab Units 08/29/24  0414   HEMOGLOBIN g/dL 11.5*   HEMATOCRIT % 38.1   WBC Thousand/uL 15.12*     Results from last 7 days   Lab Units 09/01/24  0636 08/31/24  0407 08/30/24  0539   BUN mg/dL 48* 65* 79*   POTASSIUM mmol/L 4.5 4.4 4.0   CHLORIDE mmol/L 99 94* 94*   CREATININE mg/dL 1.09 1.21 1.40*            Patient Active Problem List   Diagnosis    Diabetes 1.5, managed as type 2 (HCC)    History of hypertension    Hypercholesteremia    Hammer toe of right foot    Stasis dermatitis of both legs    Diabetic peripheral neuropathy (HCC)    Gout    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HCC)    Obesity with body mass index 30 or greater    Type 2 diabetes mellitus (HCC)    Retroperitoneal hematoma    Mesenteric lymphadenopathy    Acute blood loss anemia    FINA (acute kidney injury) on CKD stage 2(HCC)    GI bleed    Pleural effusion    Chronic venous hypertension (idiopathic) with ulcer of right lower extremity (HCC)    Rash    Stage 2 chronic kidney disease    Hypertensive kidney disease with stage 2 chronic kidney disease    Other proteinuria    Obesity, morbid (HCC)    Follicular lymphoma grade I of lymph nodes of multiple sites (HCC)    Vitamin D deficiency    Stage 3a chronic kidney disease (HCC)    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 (HCC)    Ectatic thoracic aorta (HCC)    Aortic stenosis with trileaflet valve    Sepsis without acute organ dysfunction (HCC)    Acute respiratory failure with hypoxia (HCC)    Acute hyponatremia    Chronic diastolic CHF (congestive heart failure) (HCC)    Encounter for deep vein thrombosis (DVT) prophylaxis    Hyponatremia    Neck pain     Acute osteomyelitis of cervical spine (HCC)    Transaminitis    Acute renal failure with oliguria  (HCC)    MSSA bacteremia    New onset a-fib (HCC)    Acute pericarditis    Diabetic ulcer of right foot (HCC)    Constipation         Medications  Current Facility-Administered Medications   Medication Dose Route Frequency Provider Last Rate    acetaminophen  975 mg Oral Q8H PRN Tino Chambers MD      amiodarone  200 mg Oral Daily Mayte Sanchez PA-C      apixaban  5 mg Oral BID Mayte Sanchez PA-C      atorvastatin  80 mg Oral Daily With Dinner Mayte Sanchez PA-C      bisacodyl  10 mg Rectal Daily PRN Ashley Depadua, MD      ceFAZolin  2,000 mg Intravenous Q8H Mayte Sanchez PA-C 2,000 mg (09/04/24 0544)    Cholecalciferol  4,000 Units Oral Daily Mayte Sanchez PA-C      docusate sodium  100 mg Oral BID Tino Chambers MD      insulin lispro  1-5 Units Subcutaneous HS Mayte Sanchez PA-C      insulin lispro  1-6 Units Subcutaneous TID AC Mayte Sanchez PA-C      lactulose  20 g Oral Once Ashley Depadua, MD      metFORMIN  1,000 mg Oral BID With Meals Mayte Sanchez PA-C      methocarbamol  750 mg Oral Q6H [facility] Mayte Sanchez PA-C      metoprolol tartrate  25 mg Oral Q12H [facility] Mayte Sanchez PA-C      multivitamin stress formula  1 tablet Oral Daily Mayte Sanchez PA-C      ondansetron  4 mg Oral Q6H PRN Ashley Depadua, MD      oxyCODONE  5 mg Oral Q4H PRN Ashley Depadua, MD      polyethylene glycol  17 g Oral Daily Mayte Sanchez PA-C      senna  2 tablet Oral HS Ashley Depadua, MD            ** Please Note: Fluency Direct voice to text software may have been used in the creation of this document. **

## 2024-09-04 NOTE — CONSULTS
Consultation - Infectious Disease   Augustusmichelle Coffman 68 y.o. male MRN: [id number]  Unit/Bed#: -01 Encounter: [id number]      IMPRESSION & RECOMMENDATIONS:   1.  MSSA bacteremia.  Felt to be a primary foot source.  Complicated by cervical spine infection below.  2D echo limited.  Concern remains for endovascular/perivalvular complications given possible prolonged bacteremia. Patient also too high risk for LUCRECIA now.  Has cleared bacteremia and is clinically improving.  -Continue cefazolin through 10/2/2024 to complete 6 weeks of treatment  -Recheck CBC with differential, CMP, sedimentation rate and CRP tomorrow  -Weekly labs with CBCD, creatinine, ESR and CRP on antibiotic  -Anticipate transition to oral antibiotic with Duricef on 10/3  -Will likely continue oral antibiotic until ESR/CRP improve  -Further cardiac imaging and follow-up with cardiology as outpatient     2.  C5-C6 discitis/osteomyelitis with epidural phlegmon/abscess.  Found on outpatient imaging.  As a complication of MSSA bacteremia.  Patient fortunately without any focal neurologic deficits.  No other lesions on MRI T and L-spine.  Repeat MRI C-spine now without any epidural phlegmon/abscess.  Only paravertebral abscess remains.  Neck spasms improved  -Neurosurgical evaluation noted, follow-up outpatient  -Repeat MR imaging as outpatient  -Antibiotics as above  -Recheck sedimentation rate and CRP tomorrow and weekly  -Serial exams  -Anticipate prolonged oral antibiotic course after intravenous done     3 .  Acute pericarditis with new pericardial effusion.  Patient developed abrupt onset of chest pain with ST elevations.  Troponins negative.  Clinical picture seems consistent with acute pericarditis.  Uncertain however if he has a purulent pericarditis but this would be unlikely based upon the clinical presentation and time course.    -Repeat cardiac imaging as outpatient with cardiology  -No additional ID workup for now     4.  Chronic kidney  disease.  Seems to be relatively stable without any current worsening of the renal function.  -Nephrology follow-up  -Full dose IV antibiotics  -Recheck BMP to make sure no worsening  -Volume management     5.  Acute transaminitis.  Acute elevation in LFTs noted likely due to the above.  Right upper quadrant ultrasound unremarkable.  Improving.   -Antibiotics otherwise as above  -Recheck LFTs     6.  Chronic right foot ulcer with hardware present.  No overt infection of the soft tissue and no deep tracking to suggest osteomyelitis.  -Local wound care  -Podiatry follow-up     7.  Type 2 diabetes and obesity.  Hemoglobin A1c of 6.8.  BMI of 34.  Both as risk factors for recurrent infection.  -Maintain good diabetic control.    Discussed with the primary service the plan to continue the cefazolin and they agree with the plan.    Extensive review of the medical records in epic including review of the notes, radiographs, and laboratory results     HISTORY OF PRESENT ILLNESS:  Reason for Consult: MSSA bacteremia  HPI: Augustus Coffman is a 68 y.o. year old male with diabetes mellitus, peripheral neuropathy, diabetic foot wound, hypertension, chronic kidney disease, obesity recently admitted to [facility] in [place] with progressive neck pain and found to have a complication of MSSA bacteremia with C5-6 discitis/osteomyelitis/epidural phlegmon with a small abscess who we are asked to continue ongoing antibiotic management.  The patient cleared the bacteremia.  Echocardiogram was limited but there was a concern for possible endovascular infection without obvious vegetation.  There was a pericardial effusion noted but because the patient was deemed high risks no intervention was recommended.  The patient was also felt to be too high risk for transesophageal echocardiogram.  The patient's had improvement as far as dysfunction although he still is a bit uncomfortable from the neck pain.  He has remained  afebrile and hemodynamically stable.  He is not having any nausea vomiting although he does have some loose stool.  He has no abdominal or chest pain.  He denies any cough or shortness of breath.    REVIEW OF SYSTEMS:  A complete review of systems is negative other than that noted in the HPI.    PAST MEDICAL HISTORY:  Past Medical History:   Diagnosis Date    Arthritis 2010's    Occasionally flares up    Cancer (HCC) May 2021    Still investigating    Chronic kidney disease     Diabetes mellitus (HCC)     Follicular lymphoma (HCC)     GERD (gastroesophageal reflux disease) June 2021    Noticed in an Endoscopy    Heart murmur May 2020    High cholesterol     Hypertension     Kidney stone 1980's    Have had since 1980's    Obesity Since Childhood     Past Surgical History:   Procedure Laterality Date    BUNIONECTOMY Right 11/24/2020    Procedure: RIGHT HAV CORRECTION,;  Surgeon: Niranjan Child DPM;  [facility]: [facility];  Service: Podiatry    COLONOSCOPY      INCISION AND DRAINAGE OF WOUND Right 10/05/2016    Procedure: INCISION AND DRAINAGE (I&D) EXTREMITY;  Surgeon: Niranjan Child DPM;  Location: [facility];  Service:     IR BIOPSY LYMPH NODE  07/08/2021    IR EMBOLIZATION (SPECIFY VESSEL OR SITE)  07/08/2021    IR PORT PLACEMENT  9/1/2022    IR TUNNELED CENTRAL LINE PLACEMENT  8/29/2024    PILONIDAL CYST EXCISION      AZ CORRECTION HAMMERTOE Right 02/19/2019    Procedure: THIRD HAMMER TOE CORRECTION;  Surgeon: Niranjan Child DPM;  [facility];  Service: Podiatry    AZ RMVL MATEO CTR VAD W/SUBQ PORT/ CTR/PRPH INSJ N/A 3/14/2023    Procedure: REMOVAL VENOUS PORT (PORT-A-CATH)IR;  Surgeon: Avelino Vázquez DO;  [facility];  Service: Interventional Radiology    TOE OSTEOTOMY Right 03/14/2017    Procedure: HAMMERTOE CORRECTION R 2 ;  Surgeon: Niranjan Child DPM;  [facility];  Service:     TOE OSTEOTOMY Left 11/24/2020    Procedure: LEFT HT CORRECTION TOE;  Surgeon: Niranjan Child DPM;  Location:  [facility];  Service: Podiatry    TONSILLECTOMY  1963       FAMILY HISTORY:  Non-contributory    SOCIAL HISTORY:  Social History   Social History     Substance and Sexual Activity   Alcohol Use Never     Social History     Substance and Sexual Activity   Drug Use Never     Social History     Tobacco Use   Smoking Status Never   Smokeless Tobacco Never   Tobacco Comments    Never smoked but exposed to second hand smoke from birth until        ALLERGIES:  Allergies   Allergen Reactions    Lisinopril Cough       MEDICATIONS:  All current active medications have been reviewed.  Antibiotics: Cefazolin 16    PHYSICAL EXAM:  Temp:  [97.3 °F (36.3 °C)-98.4 °F (36.9 °C)] 97.8 °F (36.6 °C)  HR:  [] 100  Resp:  [17-20] 18  BP: (101-181)/() 141/74  SpO2:  [95 %-98 %] 97 %  Temp (24hrs), Av.8 °F (36.6 °C), Min:97.3 °F (36.3 °C), Max:98.4 °F (36.9 °C)  Current: Temperature: 97.8 °F (36.6 °C)    Intake/Output Summary (Last 24 hours) at 2024 0905  Last data filed at 2024 0701  Gross per 24 hour   Intake 358 ml   Output --   Net 358 ml       General Appearance:  Appearing well, nontoxic, and in no distress   Head:  Normocephalic, without obvious abnormality, atraumatic   Eyes:  Conjunctiva pink and sclera anicteric, both eyes   Nose: Nares normal, mucosa normal, no drainage   Throat: Oropharynx moist without lesions   Neck: Supple, symmetrical, no adenopathy, no tenderness/mass/nodules.  Shelley collar in place.   Back:   Symmetric, no curvature, ROM normal, no CVA tenderness   Lungs:   Clear to auscultation bilaterally, respirations unlabored   Chest Wall:  No tenderness or deformity   Heart:  Tachycardic; no murmur, rub or gallop   Abdomen:   Soft, non-tender, non-distended, positive bowel sounds    Extremities: No cyanosis, clubbing or edema   Skin: No rashes or lesions. No draining wounds noted.   Lymph nodes: Cervical, supraclavicular nodes normal   Neurologic: Alert and oriented times 3,  extremity strength 5/5 and symmetric       LABS, IMAGING, & OTHER STUDIES:  Lab Results:  I have personally reviewed pertinent labs.  Results from last 7 days   Lab Units 08/29/24  0414   WBC Thousand/uL 15.12*   HEMOGLOBIN g/dL 11.5*   PLATELETS Thousands/uL 415*     Results from last 7 days   Lab Units 09/01/24  0636 08/31/24  0407 08/30/24  0539   SODIUM mmol/L 138 137 138   POTASSIUM mmol/L 4.5 4.4 4.0   CHLORIDE mmol/L 99 94* 94*   CO2 mmol/L 31 33* 32   BUN mg/dL 48* 65* 79*   CREATININE mg/dL 1.09 1.21 1.40*   EGFR ml/min/1.73sq m 69 61 51   CALCIUM mg/dL 9.5 9.7 9.8                           Imaging Studies:   I have personally reviewed pertinent imaging study reports and images in PACS.

## 2024-09-04 NOTE — PLAN OF CARE
Problem: PAIN - ADULT  Goal: Verbalizes/displays adequate comfort level or baseline comfort level  Description: Interventions:  - Encourage patient to monitor pain and request assistance  - Assess pain using appropriate pain scale  - Administer analgesics based on type and severity of pain and evaluate response  - Implement non-pharmacological measures as appropriate and evaluate response  - Consider cultural and social influences on pain and pain management  - Notify physician/advanced practitioner if interventions unsuccessful or patient reports new pain  Outcome: Progressing     Problem: INFECTION - ADULT  Goal: Absence or prevention of progression during hospitalization  Description: INTERVENTIONS:  - Assess and monitor for signs and symptoms of infection  - Monitor lab/diagnostic results  - Monitor all insertion sites, i.e. indwelling lines, tubes, and drains  - Monitor endotracheal if appropriate and nasal secretions for changes in amount and color  - [place] appropriate cooling/warming therapies per order  - Administer medications as ordered  - Instruct and encourage patient and family to use good hand hygiene technique  - Identify and instruct in appropriate isolation precautions for identified infection/condition  Outcome: Progressing     Problem: SAFETY ADULT  Goal: Patient will remain free of falls  Description: INTERVENTIONS:  - Educate patient/family on patient safety including physical limitations  - Instruct patient to call for assistance with activity   - Consult OT/PT to assist with strengthening/mobility   - Keep Call bell within reach  - Keep bed low and locked with side rails adjusted as appropriate  - Keep care items and personal belongings within reach  - Initiate and maintain comfort rounds  - Make Fall Risk Sign visible to staff  - Consider moving patient to room near nurses station  Outcome: Progressing  Goal: Maintain or return to baseline ADL function  Description: INTERVENTIONS:  -   Assess patient's ability to carry out ADLs; assess patient's baseline for ADL function and identify physical deficits which impact ability to perform ADLs (bathing, care of mouth/teeth, toileting, grooming, dressing, etc.)  - Assess/evaluate cause of self-care deficits   - Assess range of motion  - Assess patient's mobility; develop plan if impaired  - Assess patient's need for assistive devices and provide as appropriate  - Encourage maximum independence but intervene and supervise when necessary  - Involve family in performance of ADLs  - Assess for home care needs following discharge   - Consider OT consult to assist with ADL evaluation and planning for discharge  - Provide patient education as appropriate  Outcome: Progressing  Goal: Maintains/Returns to pre admission functional level  Description: INTERVENTIONS:  - Perform AM-PAC 6 Click Basic Mobility/ Daily Activity assessment daily.  - Set and communicate daily mobility goal to care team and patient/family/caregiver.   - Collaborate with rehabilitation services on mobility goals if consulted  - Perform Range of Motion 3 times a day.  - Reposition patient every 2 hours.  - Dangle patient 3 times a day  - Stand patient 3 times a day  - Ambulate patient 3 times a day  - Out of bed to chair 3 times a day   - Out of bed for meals 3 times a day  - Out of bed for toileting  - Record patient progress and toleration of activity level   Outcome: Progressing     Problem: DISCHARGE PLANNING  Goal: Discharge to home or other facility with appropriate resources  Description: INTERVENTIONS:  - Identify barriers to discharge w/patient and caregiver  - Arrange for needed discharge resources and transportation as appropriate  - Identify discharge learning needs (meds, wound care, etc.)  - Arrange for interpretive services to assist at discharge as needed  - Refer to Case Management Department for coordinating discharge planning if the patient needs post-hospital services  based on physician/advanced practitioner order or complex needs related to functional status, cognitive ability, or social support system  Outcome: Progressing     Problem: Prexisting or High Potential for Compromised Skin Integrity  Goal: Skin integrity is maintained or improved  Description: INTERVENTIONS:  - Identify patients at risk for skin breakdown  - Assess and monitor skin integrity  - Assess and monitor nutrition and hydration status  - Monitor labs   - Assess for incontinence   - Turn and reposition patient  - Assist with mobility/ambulation  - Relieve pressure over bony prominences  - Avoid friction and shearing  - Provide appropriate hygiene as needed including keeping skin clean and dry  - Evaluate need for skin moisturizer/barrier cream  - Collaborate with interdisciplinary team   - Patient/family teaching  - Consider wound care consult   Outcome: Progressing

## 2024-09-04 NOTE — PLAN OF CARE
Problem: PAIN - ADULT  Goal: Verbalizes/displays adequate comfort level or baseline comfort level  Description: Interventions:  - Encourage patient to monitor pain and request assistance  - Assess pain using appropriate pain scale  - Administer analgesics based on type and severity of pain and evaluate response  - Implement non-pharmacological measures as appropriate and evaluate response  - Consider cultural and social influences on pain and pain management  - Notify physician/advanced practitioner if interventions unsuccessful or patient reports new pain  Outcome: Progressing     Problem: INFECTION - ADULT  Goal: Absence or prevention of progression during hospitalization  Description: INTERVENTIONS:  - Assess and monitor for signs and symptoms of infection  - Monitor lab/diagnostic results  - Monitor all insertion sites, i.e. indwelling lines, tubes, and drains  - Monitor endotracheal if appropriate and nasal secretions for changes in amount and color  - [place] appropriate cooling/warming therapies per order  - Administer medications as ordered  - Instruct and encourage patient and family to use good hand hygiene technique  - Identify and instruct in appropriate isolation precautions for identified infection/condition  Outcome: Progressing     Problem: SAFETY ADULT  Goal: Patient will remain free of falls  Description: INTERVENTIONS:  - Educate patient/family on patient safety including physical limitations  - Instruct patient to call for assistance with activity   - Consult OT/PT to assist with strengthening/mobility   - Keep Call bell within reach  - Keep bed low and locked with side rails adjusted as appropriate  - Keep care items and personal belongings within reach  - Initiate and maintain comfort rounds  - Make Fall Risk Sign visible to staff  - Offer Toileting every 2 Hours, in advance of need  - Initiate/Maintain bed.chair alarm  - Obtain necessary fall risk management equipment: nonskid socks  -  Apply yellow socks and bracelet for high fall risk patients  - Consider moving patient to room near nurses station  Outcome: Progressing  Goal: Maintain or return to baseline ADL function  Description: INTERVENTIONS:  -  Assess patient's ability to carry out ADLs; assess patient's baseline for ADL function and identify physical deficits which impact ability to perform ADLs (bathing, care of mouth/teeth, toileting, grooming, dressing, etc.)  - Assess/evaluate cause of self-care deficits   - Assess range of motion  - Assess patient's mobility; develop plan if impaired  - Assess patient's need for assistive devices and provide as appropriate  - Encourage maximum independence but intervene and supervise when necessary  - Involve family in performance of ADLs  - Assess for home care needs following discharge   - Consider OT consult to assist with ADL evaluation and planning for discharge  - Provide patient education as appropriate  Outcome: Progressing  Goal: Maintains/Returns to pre admission functional level  Description: INTERVENTIONS:  - Perform AM-PAC 6 Click Basic Mobility/ Daily Activity assessment daily.  - Set and communicate daily mobility goal to care team and patient/family/caregiver.   - Collaborate with rehabilitation services on mobility goals if consulted  - Perform Range of Motion 3 times a day.  - Reposition patient every 2 hours.  - Dangle patient 3 times a day  - Stand patient 3 times a day  - Ambulate patient 3 times a day  - Out of bed to chair 3 times a day   - Out of bed for meals 3 times a day  - Out of bed for toileting  - Record patient progress and toleration of activity level   Outcome: Progressing     Problem: DISCHARGE PLANNING  Goal: Discharge to home or other facility with appropriate resources  Description: INTERVENTIONS:  - Identify barriers to discharge w/patient and caregiver  - Arrange for needed discharge resources and transportation as appropriate  - Identify discharge learning  needs (meds, wound care, etc.)  - Arrange for interpretive services to assist at discharge as needed  - Refer to Case Management Department for coordinating discharge planning if the patient needs post-hospital services based on physician/advanced practitioner order or complex needs related to functional status, cognitive ability, or social support system  Outcome: Progressing     Problem: Prexisting or High Potential for Compromised Skin Integrity  Goal: Skin integrity is maintained or improved  Description: INTERVENTIONS:  - Identify patients at risk for skin breakdown  - Assess and monitor skin integrity  - Assess and monitor nutrition and hydration status  - Monitor labs   - Assess for incontinence   - Turn and reposition patient  - Assist with mobility/ambulation  - Relieve pressure over bony prominences  - Avoid friction and shearing  - Provide appropriate hygiene as needed including keeping skin clean and dry  - Evaluate need for skin moisturizer/barrier cream  - Collaborate with interdisciplinary team   - Patient/family teaching  - Consider wound care consult   Outcome: Progressing

## 2024-09-04 NOTE — PROGRESS NOTES
OT Long Term Goals    Pt will be IND with donning/doffing C/S collar     09/04/24 1300   Rehab Team Goals   ADL Team Goal Patient will be independent with ADLs with least restrictive device upon completion of rehab program   Rehab Team Interventions   OT Interventions Self Care;Home Management;Therapeutic Exercise;Community Reintegration;Patient/Family Education   Eating Goal   Eating Goal 06. Independent - Patient completes the activity by him/herself with no assistance from a helper.   Status Target goal - one week   Interventions Optimal Position   Grooming Goal   Oral Hygiene Goal 06. Independent - Patient completes the activity by him/herself with no assistance from a helper.   Status Target goal - one week   Intervention Assistive Device;Balance Work;Therapeutic Exercise;Tolerance Work   Tub/Shower Transfer Goal   Method Shower Stall   Assist Device Seat with Back   Status Target goal - one week  (SUP)   Interventions ADL Training;Assistive Device   Bathing Goal   Shower/bathe self Goal 06. Independent - Patient completes the activity by him/herself with no assistance from a helper.   Status Target goal - one week   Intervention ADL Training;Assistive Device;Therapeutic Exercise   Upper Body Dressing Goal   Upper body dressing Goal 06. Independent - Patient completes the activity by him/herself with no assistance from a helper.   Status Target goal - one week   Intervention Assistive Device;Balance Work;Therapeutic Exercise;Tolerance Work   Lower Body Dressing Goal   Lower body dressing Goal 06. Independent - Patient completes the activity by him/herself with no assistance from a helper.   Putting on/taking off footwear Goal 06. Independent - Patient completes the activity by him/herself with no assistance from a helper.   Status Target goal - one week   Intervention Assistive Device;Balance Work;Therapeutic Exercise;Tolerance Work   Toileting Transfer Goal   Toilet transfer Goal 06. Independent - Patient  completes the activity by him/herself with no assistance from a helper.   Status Target goal - one week   Intervention ADL Training;Balance Work;Assistive Device   Toileting Goal   Toileting hygiene Goal 06. Independent - Patient completes the activity by him/herself with no assistance from a helper.   Status Target goal - one week   Intervention ADL Training;Balance Work;Assistive Device   Meal Prep and Kitchen Mobility   Assist Level Independent   Status Target goal - one week  (light meal prep)

## 2024-09-04 NOTE — PCC OCCUPATIONAL THERAPY
09/04/24: Pt is a 68 y.o. male who developed worsening neck pain and periscapular pain was hospitalized from 7/28 to 8/3 for sepsis believed to be secondary to diabetic foot wound, MSSA bacteremia, acute on chronic kidney injury. Pt was dx with MSSA bacteremia and transferred to [facility] to receive skilled therapy services. Pt has a past medical history of Arthritis, Cancer (HCC), Chronic kidney disease, Diabetes mellitus (HCC), Follicular lymphoma (HCC), GERD (gastroesophageal reflux disease), Heart murmur, High cholesterol, Hypertension, Kidney stone, and Obesity. Current precautions include C/S precautions, C/S collar on at all times, fall risk and pain. See flowsheet for details of pts home setup, PLOF and current functional status. Pts impairments include pain, endurance, activity tolerance, and C/S precautions/collar mgmt . These impairments along with limited caregiver support and steps to enter causes the inability to safely complete A/IADLs including Bathing, UB dressing, LB dressing, Toileting, Toilet transfer, brace mgmt and IADL Management. Pt presents with excellent rehab potential with motivation to participate and achieve goal of DC home. Pt is unsafe to DC home at this time, recommending 1 week to achieve independent with assistive device level goals with no DME/AD and C/S collar . Plan to achieve stated goals with interventions focused on ADL Retraining , IADL training , Functional Transfers, Standing tolerance, Standing balance , DME training/education, Family training/education, Energy conservation training/education, healthy coping education, Leisure and social pursuits, and community re-integration. Family training with the wife has been setup for Friday at 8am with OT only to educate on C/S collar mgmt, pad changing and C/S precautions. During that FT, also plan to assess pts wife to ambulate with pt in the hallways w/o an AD. Spoke with ANDERSON Vargas - wife will not require RN FT as IV ABX supplies  will be different with home nsg. Pt has been given Dist Sup IRP. Pt has signed pt privilege plan and is agreeable to call prior to transferring ambulating during daytime to inform nsg staff of the transfer and to call for Close SUP at night. ANDERSON Vargas also aware of the Dist Sup IRP. Pending how the pt performs at this level and how FT goes on Friday - plan to progress to IRP this weekend as pt will be a short LOS.

## 2024-09-04 NOTE — DISCHARGE INSTR - OTHER ORDERS
Skin Care Plan:  1-B/L Sacro-Buttocks Wound: Cleanse with soap and water and pat dry. Apply silver alginate (melgisorb Ag) and cover with a silicone bordered foam dressing. Eliseo with T for Treatment and change every other day or PRN.   2-Turn/reposition q2h or when medically stable for pressure re-distribution on skin .  3-Elevate heels to offload pressure.  4-Moisturize skin daily with skin nourishing cream  5-Ehob cushion in chair when out of bed.  6-Preventative Hydraguard to bilateral heels BID and PRN.   7-Right Plantar Foot Wound: per Podiatry

## 2024-09-04 NOTE — TELEPHONE ENCOUNTER
Called spoke with patient advised of new scheduled hospital follow-up with Dr.Jwalant Heath for 09/23/24 @ 2:30pm, along with BMP labs to be done prior to appointment. patient verbalized understanding and is okay with it.

## 2024-09-04 NOTE — WOUND OSTOMY CARE
Consult Note - Wound   Augustus Coffman 68 y.o. male MRN: [id number]  Unit/Bed#: [facility] 973-01 Encounter: [id number]        History and Present Illness:  Patient is a 69 yo male that was admitted to [facility] for rehab post hospitalization. Patient has a PMH of MSSA bacteremia, DM, FINA, foot ulcer. Patient is a min assist for turning and repositioning. Patient is continent of bowel and bladder. On assessment, patient is seen sitting OOB in recliner on ROHO cushion. Able to feed self.     Wound Care was consulted for sacral wound     Assessment Findings:   B/L heels are dry intact and jeff with no skin loss or wounds present. Recommend preventative Hydraguard Cream and proper offloading/ repositioning.  Podiatry is following and managing right plantar foot wound - treatment to area per their recommendations.       POA to [facility] B/L Sacro-Buttocks Evolving DTPI: irregular in shape area of partial thickness skin loss. Wound bed with mix of 30 Dark purple non-blanchable tissue, 60% yellow slough tissue, and 10% pink tissue. Delisa-wound is fragile and pink in color. Moderate serosanguineous drainage noted. Recommend silver alginate and foam dressing. Deep Tissue Pressure Injury wounds have the potential to evolve into unstageable, stage III or stage IV wounds.      No induration, fluctuance, odor, warmth/temperature differences, redness, or purulence noted to the above noted wounds and skin areas assessed. New dressings applied per orders listed below. Patient tolerated well- no s/s of non-verbal pain or discomfort observed during the encounter. Bedside nurse aware of plan of care. See flow sheets for more detailed assessment findings.      Orders listed below and wound care will continue to follow, call or Secure Chat Message with questions.     Skin Care Plan:  1-B/L Sacro-Buttocks Wound: Cleanse with soap and water and pat dry. Apply silver alginate (melgisorb Ag) and cover with a silicone bordered foam dressing. [place]iseo with T  for Treatment and change every other day or PRN.   2-Turn/reposition q2h or when medically stable for pressure re-distribution on skin .  3-Elevate heels to offload pressure.  4-Moisturize skin daily with skin nourishing cream  5-Ehob cushion in chair when out of bed.  6-Preventative Hydraguard to bilateral heels BID and PRN.   7-Right Plantar Foot Wound: per Podiatry      Wounds:  Wound 09/03/24 Pressure Injury Sacrum (Active)   Wound Image   09/04/24 0831   Wound Description Light purple;Non-blanchable erythema;Yellow;Slough;Pink;Drainage 09/04/24 0831   Pressure Injury Stage DTPI 09/04/24 0831   Delisa-wound Assessment Fragile;Pink 09/04/24 0831   Wound Length (cm) 4 cm 09/04/24 0831   Wound Width (cm) 2.5 cm 09/04/24 0831   Wound Depth (cm) 0.1 cm 09/04/24 0831   Wound Surface Area (cm^2) 10 cm^2 09/04/24 0831   Wound Volume (cm^3) 1 cm^3 09/04/24 0831   Calculated Wound Volume (cm^3) 1 cm^3 09/04/24 0831   Drainage Amount Moderate 09/04/24 0831   Drainage Description Serosanguineous 09/04/24 0831   Non-staged Wound Description Partial thickness 09/04/24 0831   Treatments Cleansed;Irrigation with NSS;Site care 09/04/24 0831   Dressing Calcium Alginate with Silver;Foam, Silicon (eg. Allevyn, etc) 09/04/24 0831   Dressing Changed New 09/04/24 0831   Patient Tolerance Tolerated well 09/04/24 0831   Dressing Status Clean;Dry;Intact 09/04/24 0831               Sandi Dougherty RN, BSN, CWOCN

## 2024-09-04 NOTE — PROGRESS NOTES
PT ARC GOALS       09/04/24 0930   Rehab Team Goals   Transfer Team Goal Patient will be independent with transfers with least restrictive device upon completion of rehab program   Locomotion Team Goal Patient will be independent with locomotion with least restrictive device upon completion of rehab program   Rehab Team Interventions   PT Interventions Gait Training;Therapeutic Exercise;Neuromuscualr Reeducation;Transfer Training;Bed Mobility;Wheelchair Mobility;Modalities;Patient/Family Education   PT Transfer Goal   Roll left and right Goal 06. Independent - Patient completes the activity by him/herself with no assistance from a helper.   Sit to lying Goal 06. Independent - Patient completes the activity by him/herself with no assistance from a helper.   Lying to sitting on side of bed Goal 06. Independent - Patient completes the activity by him/herself with no assistance from a helper.   Sit to stand Goal 06. Independent - Patient completes the activity by him/herself with no assistance from a helper.   Chair/bed-to-chair transfer Goal 06. Independent - Patient completes the activity by him/herself with no assistance from a helper.   Car Transfer Goal 06. Independent - Patient completes the activity by him/herself with no assistance from a helper.   Assistive Device   (no AD)   Safety Precautions WBAT  (RLE)   Environment Level Surface;Uneven Surface;Well Lit   Status Target goal - one week   Locomotion Goal   Primary discharge mode of locomotion Walking   Target Walk Distance 250 ft   Assist Device   (no AD)   Environment Level Surface;Uneven Surface;Well Lit   Walk 10 feet Goal 06. Independent - Patient completes the activity by him/herself with no assistance from a helper.   Walk 50 feet with 2 turns Goal 06. Independent - Patient completes the activity by him/herself with no assistance from a helper.   Walk 150 feet Goal 06. Independent - Patient completes the activity by him/herself with no assistance from a  helper.   Walking 10 feet on uneven surface 06. Independent - Patient completes the activity by him/herself with no assistance from a helper.   Walking Goal Status Target goal - one week   Wheel 50 feet with 2 turns Goal 09. Not applicable   Wheel 150 feet Goal 09. Not applicable   Stairs Goal   1 step or curb goal 06. Independent - Patient completes the activity by him/herself with no assistance from a helper.   4 steps Goal 06. Independent - Patient completes the activity by him/herself with no assistance from a helper.   12 steps Goal 06. Independent - Patient completes the activity by him/herself with no assistance from a helper.   Assist Level Moderate [place]   Number of Stairs 20   Technique Non-reciprocal;Reciprocal   Hand Rail Right   Object Retrieval Goal   Picking up object Goal 06. Independent - Patient completes the activity by him/herself with no assistance from a helper.   Assistive Device  Reacher     Caroline Krishna, PT, DPT

## 2024-09-04 NOTE — PROGRESS NOTES
09/04/24 0930   Patient Data   Rehab Impairment Impairment of mobility, safety and Activities of Daily Living (ADLs) due to Other Disabling Impairments: 13 Other Disabling Impairments   Etiologic Diagnosis MSSA bacteremia with cervical discitis and osteomyelitis   Date of Onset 08/23/24   Support System   Name Brendan Blandon wife.   Home Setup   Type of Home Split Level   Method of Entry Hand Rail Bilateral  (too far apart to reach at same time)   Number of Stairs 10   Number of Stairs in Home 10  (10 steps going up to bedroom/bathroom, 10 steps going down to living area)   In Home Hand Rail Left   Available Equipment Roller Walker  (knee scooter)   Prior Level of Function   Self-Care 3. Independent - Patient completed the activities by him/herself, with or without an assistive device, with no assistance from a helper.   Indoor-Mobility (Ambulation) 3. Independent - Patient completed the activities by him/herself, with or without an assistive device, with no assistance from a helper.   Stairs 3. Independent - Patient completed the activities by him/herself, with or without an assistive device, with no assistance from a helper.   Functional Cognition 3. Independent - Patient completed the activities by him/herself, with or without an assistive device, with no assistance from a helper.   Prior Assistance Needed for   (none)   Prior Device Used Z. None of the above  (due to wound on ball of R foot reports using knee scooter and cruches as needed)   Falls in the Last Year   Number of falls in the past 12 months 0   Restrictions/Precautions   Precautions Spinal precautions;Pain;Supervision on toilet/commode   RLE Weight Bearing Per Order WBAT  (Per podiatry noted, with R surgical shoe and weight bearing through R heel)   ROM Restrictions   (R surigcal shoe)   Braces or Orthoses C/S Collar   Pain Assessment   Pain Assessment Tool 0-10   Pain Score 4   Pain Location/Orientation Orientation: Bilateral;Location:  Shoulder;Location: Neck   Pain Onset/Description Onset: Ongoing   Hospital Pain Intervention(s) Repositioned;Rest   Transfer Bed/Chair/Wheelchair   Type of Assistance Needed Supervision   Comment no AD   Chair/Bed-to-Chair Transfer CARE Score 4   Roll Left and Right   Reason if not Attempted Independent    Roll Left and Right CARE Score 6   Sit to Lying   Comment Pt reports sleeping in recliner at home for past 4 years. Pt independently managed recliner in room. Able to management leg rest and repositioning of recliner back   Reason if not Attempted Independent   Sit to Lying CARE Score 6   Lying to Sitting on Side of Bed   Comment Pt reports sleeping in recliner at home for past 4 years. Pt independently managed recliner in room. Able to management leg rest and repositioning of recliner back   Reason if not Attempted Independent   Lying to Sitting on Side of Bed CARE Score 6   Sit to Stand   Type of Assistance Needed Supervision   Comment CS with no AD   Sit to Stand CARE Score 4   Picking Up Object   Type of Assistance Needed Supervision   Comment CS with no AD   Picking Up Object CARE Score 4   Car Transfer   Type of Assistance Needed Incidental touching   Comment CGA/CS with no AD   Car Transfer CARE Score 4   Ambulation   Primary Mode of Locomotion Prior to Admission Walk   Distance Walked (feet) 175 ft  (150 ft, 120 ft, 68 ft)   Assist Device   (no AD vs RW)   Walk Assist Level Close Supervision;Contact Guard   Findings Pt with wound on ball of R foot for over a year. Per podiatry note, WBAT on RLE with surgical shoe and bearing weight thru R heel. Without AD, pt may be bearing more weight through his entire foot than jus the heel. Trialed RW which would allow him to more easily keep weigth through R heel. Per patient, wound on his foot looks the best it has in a year. Pt acknowleges it is likely due to him being non weight bearing. Discussed using RW to allow for the wound to continue to heel vs risk of  reopening it. Pt would like to continue to ambulate without AD, but if the wound starts to worsen he is open to using AD.  Pt understands the potential risk of the wound worsening. He acknowledges at home it would worsen when he put too much weight through it   Walk 10 Feet   Type of Assistance Needed Supervision   Comment Supervision with no AD   Walk 10 Feet CARE Score 4   Walk 50 Feet with Two Turns   Type of Assistance Needed Incidental touching;Supervision   Comment started CGA progressed to Supervision with no AD   Walk 50 Feet with Two Turns CARE Score 4   Walk 150 Feet   Type of Assistance Needed Incidental touching;Supervision   Comment started CGA progressed to CS   Walk 150 Feet CARE Score 4   Walking 10 Feet on Uneven Surfaces   Type of Assistance Needed Incidental touching;Supervision   Comment started CGA progressed to CS over mat surface   Walking 10 Feet on Uneven Surfaces CARE Score 4   Wheel 50 Feet with Two Turns   Reason if not Attempted Activity not applicable   Wheel 50 Feet with Two Turns CARE Score 9   Wheel 150 Feet   Reason if not Attempted Activity not applicable   Wheel 150 Feet CARE Score 9   Curb or Single Stair   Style negotiated Curb   Type of Assistance Needed Incidental touching   Comment CGA to ascend/descend 6 inch curb step with no AD   1 Step (Curb) CARE Score 4   4 Steps   Type of Assistance Needed Incidental touching;Supervision   Comment started CGA progressed to CS to ascend/descend full flight of stairs with single HR, descend with step to pattern, ascend with reciprocal pattern   4 Steps CARE Score 4   12 Steps   Type of Assistance Needed Incidental touching;Supervision   Comment started CGA progressed to CS to ascend/descend full flight of stairs with single HR, descend with step to pattern, ascend with reciprocal pattern   12 Steps CARE Score 4   Comprehension   QI: Comprehension 4. Undestands: Clear comprehension without cues or repetitions   Expression   QI: Expression  "4. 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 PMH significant for Diabetes with peripheral neuropathy and diabetic foot wound, HTN, CKD . Prior to admission, patient was independent for ADLs, + , walking without assistive device. Patient lives with with spouse, in a 2SH. On initial evaluation, patient presents decreased strength, decreased endurance, fatigue, and pain resulting in increased assistance for all functional mobility. Patient requires Independent  for bed mobility, Supervision for transfers, Close Supervision  and Contact Guard  for ambulation, Close Supervision  and Contact Guard  for stair navigation.  Patient is high risk for falls secondary to deficits found on initial evaluation and via outcome measures. Patient will benefit from physical therapy services to address the deficits identified on evaluation and to maximize safety " and independence with all functional mobility and to decrease caregiver burden. Barriers to discharge home at this time include: limited caregiver support/ inaccessible home environment/high risk for falls/impaired cognition. Patient is a good rehabilitation candidate. Patient's estimated length of stay is 7 to 10 days with goals set for Modified [place]. PT plan of care to focus on bed mobility/transfers/gait training/stair navigation/strength training/balance training/improving activity tolerance.   PT Therapy Minutes   PT Time In 0938   PT Time Out 1108   PT Total Time (minutes) 90   PT Mode of treatment - Individual (minutes) 90   PT Mode of treatment - Concurrent (minutes) 0   PT Mode of treatment - Group (minutes) 0   PT Mode of treatment - Co-treat (minutes) 0   PT Mode of Treatment - Total time(minutes) 90 minutes   PT Cumulative Minutes 90   Cumulative Minutes   Cumulative therapy minutes 90     Caroline Krishna, PT, DPT

## 2024-09-04 NOTE — ASSESSMENT & PLAN NOTE
Previously hospitalized for MSSA bacteremia on 7/28, thought to be from heel ulcer and completed a course of antibiotics and was discharged home  Per chart, he was complaining of neck pain during that admission  He was sent to ortho outpatient and had an MRI of his neck that reported osteo and discitis so he was sent to the ED for admission  He was admitted on 8/20 and was seen by ID  LUCRECIA showed no endocarditis  MRI showed discitis/osteo at C5-C6  ID recommending 6 weeks of IV ancef    Needs repeat limited LUCRECIA when c collar is discontinued  Needs CBC and CMP weekly while on antibiotics  Has PICC which can be discontinued when ancef complete  Seen ID today.- plan for ancef for 6 weeks thru 10/3 then possibly transition to PO antibiotics until SED rate and CRP improve

## 2024-09-04 NOTE — ASSESSMENT & PLAN NOTE
Lab Results   Component Value Date    HGBA1C 6.8 (H) 07/05/2024       Recent Labs     09/03/24  1020 09/03/24  1541 09/03/24  2110 09/04/24  0620   POCGLU 131 138 141* 120         Blood Sugar Average: Last 72 hrs:  (P) 133    Home regimen: metformin and jardiance  Here: continue metformin, jardiance not on formulary here in hospital.  Will order fingersticks with sliding scale as well

## 2024-09-05 LAB
ALBUMIN SERPL BCG-MCNC: 3.4 G/DL (ref 3.5–5)
ALP SERPL-CCNC: 116 U/L (ref 34–104)
ALT SERPL W P-5'-P-CCNC: 22 U/L (ref 7–52)
ANION GAP SERPL CALCULATED.3IONS-SCNC: 12 MMOL/L (ref 4–13)
AST SERPL W P-5'-P-CCNC: 39 U/L (ref 13–39)
BILIRUB SERPL-MCNC: 0.35 MG/DL (ref 0.2–1)
BUN SERPL-MCNC: 31 MG/DL (ref 5–25)
CALCIUM ALBUM COR SERPL-MCNC: 9.7 MG/DL (ref 8.3–10.1)
CALCIUM SERPL-MCNC: 9.2 MG/DL (ref 8.4–10.2)
CHLORIDE SERPL-SCNC: 102 MMOL/L (ref 96–108)
CO2 SERPL-SCNC: 25 MMOL/L (ref 21–32)
CREAT SERPL-MCNC: 1.09 MG/DL (ref 0.6–1.3)
CRP SERPL QL: 52.6 MG/L
ERYTHROCYTE [SEDIMENTATION RATE] IN BLOOD: 95 MM/HOUR (ref 0–19)
GFR SERPL CREATININE-BSD FRML MDRD: 69 ML/MIN/1.73SQ M
GLUCOSE SERPL-MCNC: 104 MG/DL (ref 65–140)
GLUCOSE SERPL-MCNC: 108 MG/DL (ref 65–140)
GLUCOSE SERPL-MCNC: 108 MG/DL (ref 65–140)
GLUCOSE SERPL-MCNC: 137 MG/DL (ref 65–140)
GLUCOSE SERPL-MCNC: 137 MG/DL (ref 65–140)
POTASSIUM SERPL-SCNC: 4.6 MMOL/L (ref 3.5–5.3)
PROT SERPL-MCNC: 6.4 G/DL (ref 6.4–8.4)
SODIUM SERPL-SCNC: 139 MMOL/L (ref 135–147)

## 2024-09-05 PROCEDURE — 99232 SBSQ HOSP IP/OBS MODERATE 35: CPT | Performed by: INTERNAL MEDICINE

## 2024-09-05 PROCEDURE — 97110 THERAPEUTIC EXERCISES: CPT

## 2024-09-05 PROCEDURE — 99232 SBSQ HOSP IP/OBS MODERATE 35: CPT | Performed by: PHYSICIAN ASSISTANT

## 2024-09-05 PROCEDURE — 82948 REAGENT STRIP/BLOOD GLUCOSE: CPT

## 2024-09-05 PROCEDURE — 97535 SELF CARE MNGMENT TRAINING: CPT

## 2024-09-05 PROCEDURE — 80053 COMPREHEN METABOLIC PANEL: CPT

## 2024-09-05 PROCEDURE — 97530 THERAPEUTIC ACTIVITIES: CPT

## 2024-09-05 PROCEDURE — 99233 SBSQ HOSP IP/OBS HIGH 50: CPT | Performed by: PHYSICAL MEDICINE & REHABILITATION

## 2024-09-05 PROCEDURE — 85652 RBC SED RATE AUTOMATED: CPT | Performed by: INTERNAL MEDICINE

## 2024-09-05 PROCEDURE — 86140 C-REACTIVE PROTEIN: CPT | Performed by: INTERNAL MEDICINE

## 2024-09-05 RX ORDER — OXYCODONE HYDROCHLORIDE 5 MG/1
5 TABLET ORAL EVERY 4 HOURS PRN
Status: DISCONTINUED | OUTPATIENT
Start: 2024-09-05 | End: 2024-09-09

## 2024-09-05 RX ORDER — LANOLIN ALCOHOL/MO/W.PET/CERES
3 CREAM (GRAM) TOPICAL
Status: DISCONTINUED | OUTPATIENT
Start: 2024-09-05 | End: 2024-09-09 | Stop reason: HOSPADM

## 2024-09-05 RX ADMIN — CEFAZOLIN SODIUM 2000 MG: 2 SOLUTION INTRAVENOUS at 05:05

## 2024-09-05 RX ADMIN — Medication 3 MG: at 21:17

## 2024-09-05 RX ADMIN — METHOCARBAMOL 750 MG: 750 TABLET ORAL at 16:31

## 2024-09-05 RX ADMIN — METFORMIN HYDROCHLORIDE 1000 MG: 500 TABLET ORAL at 07:18

## 2024-09-05 RX ADMIN — METHOCARBAMOL 750 MG: 750 TABLET ORAL at 23:49

## 2024-09-05 RX ADMIN — DOCUSATE SODIUM 100 MG: 100 CAPSULE, LIQUID FILLED ORAL at 16:31

## 2024-09-05 RX ADMIN — OXYCODONE HYDROCHLORIDE 5 MG: 5 TABLET ORAL at 23:50

## 2024-09-05 RX ADMIN — METOPROLOL TARTRATE 25 MG: 25 TABLET, FILM COATED ORAL at 08:15

## 2024-09-05 RX ADMIN — CEFAZOLIN SODIUM 2000 MG: 2 SOLUTION INTRAVENOUS at 12:56

## 2024-09-05 RX ADMIN — METFORMIN HYDROCHLORIDE 1000 MG: 500 TABLET ORAL at 16:31

## 2024-09-05 RX ADMIN — APIXABAN 5 MG: 5 TABLET, FILM COATED ORAL at 08:15

## 2024-09-05 RX ADMIN — B-COMPLEX W/ C & FOLIC ACID TAB 1 TABLET: TAB at 08:15

## 2024-09-05 RX ADMIN — METHOCARBAMOL 750 MG: 750 TABLET ORAL at 12:55

## 2024-09-05 RX ADMIN — METOPROLOL TARTRATE 25 MG: 25 TABLET, FILM COATED ORAL at 21:18

## 2024-09-05 RX ADMIN — POLYETHYLENE GLYCOL 3350 17 G: 17 POWDER, FOR SOLUTION ORAL at 08:16

## 2024-09-05 RX ADMIN — ACETAMINOPHEN 975 MG: 325 TABLET ORAL at 08:21

## 2024-09-05 RX ADMIN — CEFAZOLIN SODIUM 2000 MG: 2 SOLUTION INTRAVENOUS at 21:22

## 2024-09-05 RX ADMIN — SENNOSIDES 17.2 MG: 8.6 TABLET, FILM COATED ORAL at 21:17

## 2024-09-05 RX ADMIN — AMIODARONE HYDROCHLORIDE 200 MG: 200 TABLET ORAL at 08:15

## 2024-09-05 RX ADMIN — Medication 4000 UNITS: at 08:16

## 2024-09-05 RX ADMIN — METHOCARBAMOL 750 MG: 750 TABLET ORAL at 05:33

## 2024-09-05 RX ADMIN — APIXABAN 5 MG: 5 TABLET, FILM COATED ORAL at 16:31

## 2024-09-05 RX ADMIN — ATORVASTATIN CALCIUM 80 MG: 80 TABLET, FILM COATED ORAL at 16:31

## 2024-09-05 RX ADMIN — DOCUSATE SODIUM 100 MG: 100 CAPSULE, LIQUID FILLED ORAL at 08:15

## 2024-09-05 NOTE — PROGRESS NOTES
Progress Note - Infectious Disease   Augustus Coffman 68 y.o. male MRN: [id number]  Unit/Bed#: -01 Encounter: [id number]      Impression/Plan:  1.  MSSA bacteremia.  Felt to be a primary foot source.  Complicated by cervical spine infection below.  2D echo limited.  Concern remains for endovascular/perivalvular complications given possible prolonged bacteremia. Patient also too high risk for LUCRECIA now.  Has cleared bacteremia and is clinically improving.  -Continue cefazolin through 10/2/2024 to complete 6 weeks of treatment  -Weekly labs with CBCD, creatinine, ESR and CRP on antibiotic  -Anticipate transition to oral antibiotic with Duricef on 10/3  -Will likely continue oral antibiotic until ESR/CRP improve  -Further cardiac imaging and follow-up with cardiology as outpatient     2.  C5-C6 discitis/osteomyelitis with epidural phlegmon/abscess.  Found on outpatient imaging.  As a complication of MSSA bacteremia.  Patient fortunately without any focal neurologic deficits.  No other lesions on MRI T and L-spine.  Repeat MRI C-spine now without any epidural phlegmon/abscess.  Only paravertebral abscess remains.  Neck spasms improved.  Sedimentation rate 95 on 9/5/2024 although CRP has decreased to 52.  -Neurosurgical evaluation noted, follow-up outpatient  -Repeat MR imaging as outpatient  -Antibiotics as above  -Recheck sedimentation rate and CRP tomorrow and weekly  -Serial exams  -Anticipate prolonged oral antibiotic course after intravenous done     3 .  Acute pericarditis with new pericardial effusion.  Patient developed abrupt onset of chest pain with ST elevations.  Troponins negative.  Clinical picture seems consistent with acute pericarditis.  Uncertain however if he has a purulent pericarditis but this would be unlikely based upon the clinical presentation and time course.    -Repeat cardiac imaging as outpatient with cardiology  -No additional ID workup for now     4.  Chronic kidney disease.  Seems to  be relatively stable without any current worsening of the renal function.  -Nephrology follow-up  -Full dose IV antibiotics  -Recheck BMP to make sure no worsening  -Volume management     5.  Acute transaminitis.  Acute elevation in LFTs noted likely due to the above.  Right upper quadrant ultrasound unremarkable.  Improving.  Much improved LFTs  -No additional ID workup needed for now     6.  Chronic right foot ulcer with hardware present.  No overt infection of the soft tissue and no deep tracking to suggest osteomyelitis.  -Local wound care  -Podiatry follow-up     7.  Type 2 diabetes and obesity.  Hemoglobin A1c of 6.8.  BMI of 34.  Both as risk factors for recurrent infection.  -Maintain good diabetic control.    Discussed with the primary service the plan to continue the cefazolin for now and they agree with the plan    Antibiotics:  Cefazolin 17    Subjective:  Patient has no fever, chills, sweats; no nausea, vomiting, diarrhea; no cough, shortness of breath; no increased pain. No new symptoms.  He is feeling better overall.    Objective:  Vitals:  Temp:  [97.7 °F (36.5 °C)-98.1 °F (36.7 °C)] 98.1 °F (36.7 °C)  HR:  [84-99] 86  Resp:  [17-19] 18  BP: ()/(57-67) 112/67  SpO2:  [96 %-98 %] 97 %  Temp (24hrs), Av °F (36.7 °C), Min:97.7 °F (36.5 °C), Max:98.1 °F (36.7 °C)  Current: Temperature: 98.1 °F (36.7 °C)    Physical Exam:   General Appearance:  Alert, interactive, nontoxic, no acute distress.  Swink collar in place   Throat: Oropharynx moist without lesions.    Lungs:   Clear to auscultation bilaterally; no wheezes, rhonchi or rales; respirations unlabored   Heart:  RRR; no murmur, rub or gallop   Abdomen:   Soft, non-tender, non-distended, positive bowel sounds.     Extremities: No clubbing, cyanosis or edema   Skin: No new rashes or lesions. No draining wounds noted.       Labs, Imaging, & Other studies:   All pertinent labs and imaging studies were personally reviewed      Results from  last 7 days   Lab Units 09/05/24  0500 09/01/24  0636 08/31/24  0407   SODIUM mmol/L 139 138 137   POTASSIUM mmol/L 4.6 4.5 4.4   CHLORIDE mmol/L 102 99 94*   CO2 mmol/L 25 31 33*   BUN mg/dL 31* 48* 65*   CREATININE mg/dL 1.09 1.09 1.21   EGFR ml/min/1.73sq m 69 69 61   CALCIUM mg/dL 9.2 9.5 9.7   AST U/L 39  --   --    ALT U/L 22  --   --    ALK PHOS U/L 116*  --   --              Results from last 7 days   Lab Units 09/05/24  0500   CRP mg/L 52.6*

## 2024-09-05 NOTE — PCC CARE MANAGEMENT
Pt admitted s/p MSSA bacteremia and has undergone evals with therapies. Pt is functioning well and is expected to have a short rehab stay. Pt is on iv abx and will need on dc. Cm assmt to be completed today with appropriate referrals for dc planning.

## 2024-09-05 NOTE — PROGRESS NOTES
09/05/24 1400   Pain Assessment   Pain Assessment Tool 0-10   Pain Score 3   Pain Location/Orientation Orientation: Bilateral;Location: Shoulder   Restrictions/Precautions   Precautions Fall Risk;Pain;Spinal precautions  (5lb weight lifting restriction bilateral UEs)   RLE Weight Bearing Per Order WBAT   Braces or Orthoses C/S Collar   Cognition   Overall Cognitive Status WFL   Arousal/Participation Alert;Responsive;Cooperative   Attention Within functional limits   Orientation Level Oriented X4   Memory Within functional limits   Following Commands Follows one step commands without difficulty   Roll Left and Right   Reason if not Attempted Activity not applicable   Roll Left and Right CARE Score 9   Sit to Lying   Comment does not sleep in a bed at baseline. has been sleeping in recliner for 4 yrs    Reason if not Attempted Activity not applicable   Sit to Lying CARE Score 9   Lying to Sitting on Side of Bed   Comment does not sleep in a bed at baseline. has been sleeping in recliner for 4 yrs    Reason if not Attempted Activity not applicable   Lying to Sitting on Side of Bed CARE Score 9   Sit to Stand   Type of Assistance Needed Set-up / clean-up   Comment distant supervision with no AD   Sit to Stand CARE Score 5   Bed-Chair Transfer   Type of Assistance Needed Set-up / clean-up   Comment distant supervision with no AD   Chair/Bed-to-Chair Transfer CARE Score 5   Walk 10 Feet   Type of Assistance Needed Set-up / clean-up   Comment distant supervision with no AD   Walk 10 Feet CARE Score 5   Walk 50 Feet with Two Turns   Type of Assistance Needed Set-up / clean-up   Comment distant supervision with no AD   Walk 50 Feet with Two Turns CARE Score 5   Walk 150 Feet   Type of Assistance Needed Supervision;Set-up / clean-up   Comment started with CS but progressed to DS by end of sesion   Walk 150 Feet CARE Score 4   Walking 10 Feet on Uneven Surfaces   Type of Assistance Needed Set-up / clean-up   Comment DS with  no AD over mat surface   Walking 10 Feet on Uneven Surfaces CARE Score 5   Ambulation   Primary Mode of Locomotion Prior to Admission Walk   Distance Walked (feet) 275 ft  (110 ft)   Assist Device   (no AD)   Wheel 50 Feet with Two Turns   Reason if not Attempted Activity not applicable   Wheel 50 Feet with Two Turns CARE Score 9   Wheel 150 Feet   Reason if not Attempted Activity not applicable   Wheel 150 Feet CARE Score 9   Curb or Single Stair   Style negotiated Single stair   Type of Assistance Needed Set-up / clean-up   Comment DS with unilateral HR, step to pattern   1 Step (Curb) CARE Score 5   4 Steps   Type of Assistance Needed Set-up / clean-up   Comment DS with unilateral HR, step to pattern   4 Steps CARE Score 5   12 Steps   Type of Assistance Needed Set-up / clean-up   Comment DS with unilateral HR, step to pattern   12 Steps CARE Score 5   Stairs   Type Stairs   # of Steps 12   Picking Up Object   Type of Assistance Needed Set-up / clean-up   Comment DS with no AD   Picking Up Object CARE Score 5   Therapeutic Interventions   Strengthening Access Code: [email or web address]  URL: [email or web address]  Date: 09/05/2024  Prepared by: Caroline Krishna    Exercises  - Sit to Stand  - 1 x daily - 7 x weekly - 3 sets - 10 reps  - Standing Hip Abduction with Resistance at Ankles and Counter Support  - 1 x daily - 7 x weekly - 2 sets - 15 reps  - Standing Hip Extension with Resistance at Ankles and Unilateral Counter Support  - 1 x daily - 7 x weekly - 2 sets - 15 reps  - Standing March with Counter Support  - 1 x daily - 7 x weekly - 2 sets - 30 reps  (seated knee extension 5# AW 3x10 each LE. Provided printed handout)   Equipment Use   NuStep level 2 x15 minutes LEs only   Assessment   Treatment Assessment Patient participated in 60 minute skilled physical therapy session focusing on implementation of HEP, stair management, and gait training to improve overall activity tolerance and stamina. Pt was able to  clear surgical shoe without the front of the shoe catching on the step. Progressed patient to distant supervision for all functional mobility. Plan to progress to independent level tomorrow or Saturday. Pt will benefit from continued skilled PT services for gait training focusing on longer distances, NuStep for conditioning, stair management, and LE strengthening   Problem List Decreased strength;Decreased endurance;Impaired balance;Decreased mobility;Pain   Barriers to Discharge Inaccessible home environment   Plan   Treatment/Interventions Functional transfer training;LE strengthening/ROM;Therapeutic exercise;Endurance training;Patient/family training;Equipment eval/education;Gait training   Progress Progressing toward goals   PT Therapy Minutes   PT Time In 1400   PT Time Out 1500   PT Total Time (minutes) 60   PT Mode of treatment - Individual (minutes) 60   PT Mode of treatment - Concurrent (minutes) 0   PT Mode of treatment - Group (minutes) 0   PT Mode of treatment - Co-treat (minutes) 0   PT Mode of Treatment - Total time(minutes) 60 minutes   PT Cumulative Minutes 150     Caroline Krishna, PT, DPT

## 2024-09-05 NOTE — PLAN OF CARE
Problem: PAIN - ADULT  Goal: Verbalizes/displays adequate comfort level or baseline comfort level  Description: Interventions:  - Encourage patient to monitor pain and request assistance  - Assess pain using appropriate pain scale  - Administer analgesics based on type and severity of pain and evaluate response  - Implement non-pharmacological measures as appropriate and evaluate response  - Consider cultural and social influences on pain and pain management  - Notify physician/advanced practitioner if interventions unsuccessful or patient reports new pain  Outcome: Progressing     Problem: SAFETY ADULT  Goal: Patient will remain free of falls  Description: INTERVENTIONS:  - Educate patient/family on patient safety including physical limitations  - Instruct patient to call for assistance with activity   - Consult OT/PT to assist with strengthening/mobility   - Keep Call bell within reach  - Keep bed low and locked with side rails adjusted as appropriate  - Keep care items and personal belongings within reach  - Initiate and maintain comfort rounds  - Make Fall Risk Sign visible to staff  - Offer Toileting every 2 Hours, in advance of need  - Initiate/Maintain bed/chair alarm  - Obtain necessary fall risk management equipment: non skid footwear  Problem: Prexisting or High Potential for Compromised Skin Integrity  Goal: Skin integrity is maintained or improved  Description: INTERVENTIONS:  - Identify patients at risk for skin breakdown  - Assess and monitor skin integrity  - Assess and monitor nutrition and hydration status  - Monitor labs   - Assess for incontinence   - Turn and reposition patient  - Assist with mobility/ambulation  - Relieve pressure over bony prominences  - Avoid friction and shearing  - Provide appropriate hygiene as needed including keeping skin clean and dry  - Evaluate need for skin moisturizer/barrier cream  - Collaborate with interdisciplinary team   - Patient/family teaching  - Consider  wound care consult   Outcome: Progressing     - Apply yellow socks and bracelet for high fall risk patients  - Consider moving patient to room near nurses station  Outcome: Progressing

## 2024-09-05 NOTE — PROGRESS NOTES
09/05/24 1100   Pain Assessment   Pain Assessment Tool 0-10   Pain Score 3   Pain Location/Orientation Orientation: Bilateral;Location: Shoulder   Restrictions/Precautions   Precautions Fall Risk;Pain;Spinal precautions  (5lb weight lifting restriction bilateral UEs)   Braces or Orthoses C/S Collar   Cognition   Overall Cognitive Status WFL   Arousal/Participation Alert;Responsive;Cooperative   Attention Within functional limits   Orientation Level Oriented X4   Memory Within functional limits   Following Commands Follows one step commands without difficulty   Roll Left and Right   Reason if not Attempted Activity not applicable   Roll Left and Right CARE Score 9   Sit to Lying   Comment does not sleep in a bed at baseline. has been sleeping in recliner for 4 yrs   Reason if not Attempted Activity not applicable   Sit to Lying CARE Score 9   Lying to Sitting on Side of Bed   Comment does not sleep in a bed at baseline. has been sleeping in recliner for 4 yrs   Reason if not Attempted Activity not applicable   Lying to Sitting on Side of Bed CARE Score 9   Sit to Stand   Type of Assistance Needed Set-up / clean-up   Comment DS with no AD   Sit to Stand CARE Score 5   Bed-Chair Transfer   Type of Assistance Needed Set-up / clean-up   Comment DS with no AD   Chair/Bed-to-Chair Transfer CARE Score 5   Walk 10 Feet   Type of Assistance Needed Set-up / clean-up   Comment DS with no AD   Walk 10 Feet CARE Score 5   Walk 50 Feet with Two Turns   Type of Assistance Needed Set-up / clean-up   Comment DS with no AD   Walk 50 Feet with Two Turns CARE Score 5   Walk 150 Feet   Type of Assistance Needed Set-up / clean-up   Comment DS with no AD   Walk 150 Feet CARE Score 5   Ambulation   Primary Mode of Locomotion Prior to Admission Walk   Distance Walked (feet) 700 ft  (120 ft, 350ft)   Assist Device   (no AD)   Wheel 50 Feet with Two Turns   Reason if not Attempted Activity not applicable   Wheel 50 Feet with Two Turns CARE  Score 9   Wheel 150 Feet   Reason if not Attempted Activity not applicable   Wheel 150 Feet CARE Score 9   Curb or Single Stair   Style negotiated Curb   Type of Assistance Needed Set-up / clean-up   Comment DS with no AD to ascend/descend 6 inch curb step x2 trials   1 Step (Curb) CARE Score 5   4 Steps   Type of Assistance Needed Supervision   Comment S to ascend/descend full flight of stairs with unilateral handrail, step to pattern, required Supervision as he caught his R surgical 2x when ascending the stairs   4 Steps CARE Score 4   12 Steps   Type of Assistance Needed Supervision   Comment S to ascend/descend full flight of stairs with unilateral handrail, step to pattern, required Supervision as he caught his R surgical 2x when ascending the stairs   12 Steps CARE Score 4   Stairs   Type Stairs   # of Steps 20   Therapeutic Interventions   Strengthening STS from raised mat surface 2x10   Assessment   Treatment Assessment Patient participated in brief 30 minute skilled physical therapy session focusing on gait training to improve overall stamina and stair management. Pt requesting HEP for home. Plan to provide pt with HEP during afternoon session. Pt is making good progress in therapy. Plan is for patient to discharge home on Monday, 9/9 with HH RN services due to I antibiotics   Problem List Decreased strength;Decreased endurance;Impaired balance;Decreased mobility;Pain   Barriers to Discharge Inaccessible home environment   Plan   Treatment/Interventions Functional transfer training;LE strengthening/ROM;Therapeutic exercise;Endurance training;Patient/family training;Bed mobility;Gait training   Progress Progressing toward goals   Discharge Recommendation   Rehab Resource Intensity Level, PT   (no further PT needs following discharge)   PT Therapy Minutes   PT Time In 1100   PT Time Out 1130   PT Total Time (minutes) 30   PT Mode of treatment - Individual (minutes) 30   PT Mode of treatment - Concurrent  (minutes) 0   PT Mode of treatment - Group (minutes) 0   PT Mode of treatment - Co-treat (minutes) 0   PT Mode of Treatment - Total time(minutes) 30 minutes   PT Cumulative Minutes 120     Caroline Krishna, PT, DPT

## 2024-09-05 NOTE — PROGRESS NOTES
"OT daily treatment note       09/05/24 0900   Pain Assessment   Pain Assessment Tool 0-10   Pain Score 4   Pain Location/Orientation Orientation: Bilateral;Location: Shoulder   Hospital Pain Intervention(s) Shower/Bath   Restrictions/Precautions   Precautions Fall Risk;Pain;Spinal precautions  (5 lb BUE weight bearing limit)   RLE Weight Bearing Per Order WBAT  (through R heel with surgical shoe)   Braces or Orthoses C/S Collar   Lifestyle   Autonomy \"three weeks ago it took three people to get me out of bed\"   Eating   Type of Assistance Needed Independent   Physical Assistance Level No physical assistance   Eating CARE Score 6   Oral Hygiene   Type of Assistance Needed Supervision   Physical Assistance Level No physical assistance   Comment DS in stance at sink   Oral Hygiene CARE Score 4   Shower/Bathe Self   Type of Assistance Needed Physical assistance   Physical Assistance Level 25% or less   Comment pt completed full shower in stance. pt able to bathe 9/10 parts requiring assistance for BL feet 2/2 spinal precautions. pt requested to bag RLE reporting his podiatrist told him he isnt allowed to get his foot wet so that foot would not be cleaned in the shower regardless. OT edu that in order to safely clean feet, pt would either need to sit and cross legs or use a LHS. Pt reports he usually allows the soapy water to run down his legs to his feet but is agreeable to w/e OT recommends. Pt with G safety awareness and no LOB noted in stance. to note, yesenia collar was placed for shower and R chest wall picc line was covered with tegaderm to keep dry.   Shower/Bathe Self CARE Score 3   Upper Body Dressing   Type of Assistance Needed Supervision   Physical Assistance Level No physical assistance   Comment SUP in stance at sink in front of mirror to very briefly remove C/S collar, remove (or don) shirt and don C/S collar. Pt was able to  maintain C/S precautions and pt able to don and off collar with Min VC to ensure " the collar has a snug fit.   Upper Body Dressing CARE Score 4   Lower Body Dressing   Type of Assistance Needed Supervision   Physical Assistance Level No physical assistance   Comment seated to thread; SUP in stance as pt dons over hips   Lower Body Dressing CARE Score 4   Putting On/Taking Off Footwear   Type of Assistance Needed Supervision   Physical Assistance Level No physical assistance   Comment inc time to don socks, snekaer and surgical shoe via cross leg technique   Putting On/Taking Off Footwear CARE Score 4   Sit to Stand   Type of Assistance Needed Supervision   Physical Assistance Level No physical assistance   Comment no AD   Sit to Stand CARE Score 4   Bed-Chair Transfer   Type of Assistance Needed Supervision   Physical Assistance Level No physical assistance   Comment no AD   Chair/Bed-to-Chair Transfer CARE Score 4   Toileting Hygiene   Type of Assistance Needed Supervision   Physical Assistance Level No physical assistance   Comment DS for CM/hygiene   Toileting Hygiene CARE Score 4   Toilet Transfer   Type of Assistance Needed Supervision   Physical Assistance Level No physical assistance   Comment no AD; standard toilet height   Toilet Transfer CARE Score 4   Cognition   Overall Cognitive Status WFL   Arousal/Participation Alert;Responsive;Cooperative   Attention Within functional limits   Orientation Level Oriented X4   Memory Within functional limits   Following Commands Follows one step commands without difficulty   Additional Activities   Additional Activities Other (Comment)   Additional Activities Comments time spent reviewing how to change and clean collar pads. Edu pt to complete daily. pt in agreement. pt would benefit from reviewing once more during FT tmrw.   Activity Tolerance   Activity Tolerance Patient tolerated treatment well   Other Comments   Assessment (S)  please progress pt to true IRP (independent) tomorrow and update yellow sheet to state independent.   Assessment    Treatment Assessment Pt participated in skilled OT session with focus on ADL retraining, functional transfer training, compensatory technique education, and continued education. See flowsheet for details of session and current functional status. Pt is limited by increased fall risk, pain, and decreased strength and requires skilled OT services to increase independence and safety with ADL completion in prep for [place] home Monday. Pt will have FT scheduled tmrw with the wife for an ADL session. Plan is to have pt shower and edu wife on the following: c/s precautions, c/s collar mgmt including donning/doffing collar, correct fit, pad donning/doffing and cleaning and yesenia collar mgmt. Pt has a walk in shower with a built in seat but prefers to stand so OT is recc that family be present (doesn't need to be CS) when he showers. If pt wants to clean his feet while maintaining C/S precautions then he will need to either sit and cross leg or purchase a LHS. For dressing UB, pt is able to very briefly remove his collar, don/doff his shirt and the collar goes back on immediately. The only time the pt is to be w/o a collar is when he is changing his shirt, switching his collar pads or switching to the yesenia. Pt should be wearing a surgical shoe on his RLE for wound care mgmt. No AD is needed for home and community distances. OT will address IADL mgmt over weekend but anticipate pt will be IND by [place]. Pt still does work FT but anticipate he will need to be more sedentary upon his return to work as his endurance is worse compared to baseline and he does still get pain/stiffness in BL shoulders. Pt is progressing well and plan is to DC home Monday with home health [facility] where they will edu the pt/family on IV ABX and wound care. Pending how this FT goes, if the wife wants another FT session then please set that up, if not then no further FT is needed. Plan to focus on higher level endurance and IADL mgmt in upcoming sessions.    Prognosis Good   Problem List Decreased strength;Decreased endurance;Impaired balance;Decreased mobility;Pain   Barriers to Discharge Inaccessible home environment   Plan   Treatment/Interventions ADL retraining;Functional transfer training;Therapeutic exercise;Endurance training;Equipment eval/education;Compensatory technique education   Progress Progressing toward goals   OT Therapy Minutes   OT Time In 0900   OT Time Out 1030   OT Total Time (minutes) 90   OT Mode of treatment - Individual (minutes) 90   OT Mode of treatment - Concurrent (minutes) 0   OT Mode of treatment - Group (minutes) 0   OT Mode of treatment - Co-treat (minutes) 0   OT Mode of Treatment - Total time(minutes) 90 minutes   OT Cumulative Minutes 180   Therapy Time missed   Time missed? No

## 2024-09-05 NOTE — PROGRESS NOTES
[facility]  Progress Note  Name: Augustus Coffman I  MRN: [id number]  Unit/Bed#: -01 I Date of Admission: 9/3/2024   Date of Service: 9/5/2024 I Hospital Day: 2    Assessment & Plan   * MSSA bacteremia  Assessment & Plan  Previously hospitalized for MSSA bacteremia on 7/28, thought to be from heel ulcer and completed a course of antibiotics and was discharged home  Per chart, he was complaining of neck pain during that admission  He was sent to ortho outpatient and had an MRI of his neck that reported osteo and discitis so he was sent to the ED for admission  He was admitted on 8/20 and was seen by ID  LUCRECIA showed no endocarditis  MRI showed discitis/osteo at C5-C6  ID recommending 6 weeks of IV ancef    Needs repeat limited LUCRECIA when c collar is discontinued  Needs CBC and CMP weekly while on antibiotics  Has PICC which can be discontinued when ancef complete  ID following - plan for ancef for 6 weeks thru 10/3 then possibly transition to PO antibiotics until SED rate and CRP improve    Diabetic ulcer of right foot (HCC)  Assessment & Plan  Lab Results   Component Value Date    HGBA1C 6.8 (H) 07/05/2024       Recent Labs     09/04/24  1632 09/04/24  2053 09/05/24  0625 09/05/24  1055   POCGLU 111 148* 137 108         Blood Sugar Average: Last 72 hrs:  (P) 129.5    Follows with wound care as outpatient  Was seen by podiatry during recent hospitalization  Continue local wound care  Follow up with wound and podiatry    Acute pericarditis  Assessment & Plan  Had LUCRECIA 8/27 and moderate pericardial effusion seen  Per cardiology- no plans for drainage  Completed colchicine and prednisone on 8/27  Needs follow up LUCRECIA in 1 month    New onset a-fib (HCC)  Assessment & Plan  Newly diagnosed in hospital on 8/21/24  Seen by cardiology  Initially on amiodarone infusion- now on PO amiodarone  Also started on metoprolol 25 mg BID  On eliquis for AC    Transaminitis  Assessment &  Plan  Seen on labs in hospital  Improving.  Continue to monitor.    Acute osteomyelitis of cervical spine (HCC)  Assessment & Plan  Seen by neurosurg  Ancef for 6 weeks per ID  Continue use of C-collar  Monitor neuro checks    FINA (acute kidney injury) on CKD stage 2(HCC)  Assessment & Plan  FINA during hospitalization, creat peaked at 3.38. now down to 1.09  Was seen by nephrology  Had avilez which was subsequently removed  Avoid nephrotoxins  Monitor labs  Follow up with nephrology after discharge    Type 2 diabetes mellitus (HCC)  Assessment & Plan  Lab Results   Component Value Date    HGBA1C 6.8 (H) 07/05/2024       Recent Labs     09/04/24  1632 09/04/24 2053 09/05/24  0625 09/05/24  1055   POCGLU 111 148* 137 108         Blood Sugar Average: Last 72 hrs:  (P) 129.5    Home regimen: metformin and jardiance  Here: continue metformin, jardiance not on formulary here in hospital.  Continue accuchecks and SSI.             The above assessment and plan was reviewed and updated as determined by my evaluation of the patient on 9/5/2024.    Labs:       Results from last 7 days   Lab Units 09/05/24  0500 09/01/24  0636   SODIUM mmol/L 139 138   POTASSIUM mmol/L 4.6 4.5   CHLORIDE mmol/L 102 99   CO2 mmol/L 25 31   BUN mg/dL 31* 48*   CREATININE mg/dL 1.09 1.09   CALCIUM mg/dL 9.2 9.5             Results from last 7 days   Lab Units 09/05/24  1055 09/05/24  0625 09/04/24  2053   POC GLUCOSE mg/dl 108 137 148*       Imaging  No orders to display       Review of Scheduled Meds:  Current Facility-Administered Medications   Medication Dose Route Frequency Provider Last Rate    acetaminophen  975 mg Oral Q8H PRN Tino Chambers MD      amiodarone  200 mg Oral Daily Mayte Sanchez PA-C      apixaban  5 mg Oral BID Mayte Sanchez PA-C      atorvastatin  80 mg Oral Daily With Dinner Mayte Sanchez PA-C      bisacodyl  10 mg Rectal Daily PRN Ashley Depadua, MD      ceFAZolin  2,000 mg Intravenous Q8H Mayte Sanchez  PA-C 2,000 mg (09/05/24 0505)    Cholecalciferol  4,000 Units Oral Daily Mayte Sanchez PA-C      docusate sodium  100 mg Oral BID Tino Chambers MD      insulin lispro  1-5 Units Subcutaneous HS Mayte Westonmargaritotroy, NITESH      insulin lispro  1-6 Units Subcutaneous TID AC Matye Sanchez PA-C      melatonin  3 mg Oral HS Tino Chambers MD      metFORMIN  1,000 mg Oral BID With Meals Mayte Sanchez PA-C      methocarbamol  750 mg Oral Q6H SRIDHAR ROSANA Griffiths-DIANNE      metoprolol tartrate  25 mg Oral Q12H SRIDHAR Mayte Sanchez PA-C      multivitamin stress formula  1 tablet Oral Daily Mayte Sacnhez PA-C      ondansetron  4 mg Oral Q6H PRN Ashley Depadua, MD      oxyCODONE  5 mg Oral Q4H PRN Ashley Depadua, MD      polyethylene glycol  17 g Oral Daily Mayte Sanchez PA-C      senna  2 tablet Oral HS Ashley Depadua, MD         Subjective/ HPI: Patient seen and examined. Patients overnight issues or events were reviewed with nursing staff. New or overnight issues include the following:     Pt seen in his room. He states that he is doing well. He is having trouble sleeping due to not being able to get comfortable in the bed. He denies any other complaints.    ROS:   A 10 point ROS was performed; negative except as noted above.        *Labs /Radiology studies Reviewed  *Medications  reviewed and reconciled as needed  *Please refer to order section for additional ordered labs studies      Physical Examination:  Vitals:   Vitals:    09/04/24 1626 09/04/24 2037 09/05/24 0536 09/05/24 0815   BP: 113/57 110/63 108/64 112/67   BP Location: Left arm Left arm Left arm    Pulse: 89 99 84 86   Resp:  19 18    Temp:  98.1 °F (36.7 °C) 98.1 °F (36.7 °C)    TempSrc:  Oral Oral    SpO2:  98% 97%    Weight:       Height:           General Appearance: NAD; pleasant  HEENT: PERRLA, conjuctiva normal; mucous membranes moist; face symmetrical  Neck:  C-collar intact  Lungs: clear bilaterally, normal respiratory effort, no  retractions, expiratory effort normal, on room air  CV: regular rate and rhythm, no murmurs rubs or gallops noted   ABD: soft non tender, +BS x4  EXT: DP pulses intact, no lymphadenopathy, no edema  Skin: normal turgor, normal texture, no rash  Psych: affect normal, mood normal  Neuro: AAOx3       The above physical exam was reviewed and updated as determined by my evaluation of the patient on 9/5/2024.    Invasive Devices       Central Venous Catheter Line  Duration             CVC Central Lines 08/29/24 Right Other (Comment) 7 days                       VTE Pharmacologic Prophylaxis: Eliquis  Code Status: Level 1 - Full Code  Current Length of Stay: 2 day(s)    Total floor / unit time spent today  35 minutes   Coordination of patient's care was performed in conjunction with consulting services. Time invested included review of patient's labs, vitals, and management of their comorbidities with continued monitoring, examination of patient as well as answering patient questions, documenting her findings and creating progress note in electronic medical record,  ordering appropriate diagnostic testing.       ** Please Note:  voice to text software may have been used in the creation of this document. Although proof errors in transcription or interpretation are a potential of such software**

## 2024-09-05 NOTE — ASSESSMENT & PLAN NOTE
Previously hospitalized for MSSA bacteremia on 7/28, thought to be from heel ulcer and completed a course of antibiotics and was discharged home  Per chart, he was complaining of neck pain during that admission  He was sent to ortho outpatient and had an MRI of his neck that reported osteo and discitis so he was sent to the ED for admission  He was admitted on 8/20 and was seen by ID  LUCRECIA showed no endocarditis  MRI showed discitis/osteo at C5-C6  ID recommending 6 weeks of IV ancef    Needs repeat limited LUCRECIA when c collar is discontinued  Needs CBC and CMP weekly while on antibiotics  Has PICC which can be discontinued when ancef complete  ID following - plan for ancef for 6 weeks thru 10/3 then possibly transition to PO antibiotics until SED rate and CRP improve

## 2024-09-05 NOTE — TEAM CONFERENCE
Acute RehabilitationTe Conference Note  Date: 9/5/2024   Time: 9:08 A.M        Patient Name:  Augustus Coffman       Medical Record Number: [id number]   YOB: 1955  Sex: Male          Room/Bed:  [facility]-01  Payor Info:  Payor: MEDICARE / Plan: MEDICARE A AND B / Product Type: Medicare A & B Fee for Service /      Admitting Diagnosis: MSSA (methicillin susceptible Staphylococcus aureus) [A49.01]   Admit Date/Time:  9/3/2024  2:48 PM  Admission Comments: No comment available     Primary Diagnosis:  <principal problem not specified>  Principal Problem: <principal problem not specified>    Patient Active Problem List    Diagnosis Date Noted    Pressure injury of deep tissue of buttock 09/04/2024    Constipation 08/27/2024    Acute pericarditis 08/23/2024    Diabetic ulcer of right foot ([facility]) 08/23/2024    MSSA bacteremia 08/22/2024    New onset a-fib ([facility]) 08/22/2024    Acute osteomyelitis of cervical spine ([facility]) 08/20/2024    Transaminitis 08/20/2024    Acute renal failure with oliguria  ([facility]) 08/20/2024    Neck pain 07/31/2024    Sepsis without acute organ dysfunction ([facility]) 07/29/2024    Acute respiratory failure with hypoxia ([facility]) 07/29/2024    Acute hyponatremia 07/29/2024    Chronic diastolic CHF (congestive heart failure) ([facility]) 07/29/2024    Encounter for deep vein thrombosis (DVT) prophylaxis 07/29/2024    Hyponatremia 07/29/2024    Aortic stenosis with trileaflet valve 07/15/2024    Ectatic thoracic aorta ([facility]) 05/13/2024    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 ([facility]) 10/09/2023    Vitamin D deficiency 03/08/2023    Stage 3a chronic kidney disease ([facility]) 03/08/2023    Follicular lymphoma grade I of lymph nodes of multiple sites ([facility]) 08/15/2022    Obesity, morbid ([facility]) 08/08/2022    Other proteinuria 07/20/2022    Stage 2 chronic kidney disease 09/15/2021    Hypertensive kidney disease with stage 2 chronic kidney disease 09/15/2021    Rash 08/17/2021    Chronic venous hypertension (idiopathic)  with ulcer of right lower extremity ([facility]) 08/03/2021    GI bleed 07/19/2021    Pleural effusion 07/19/2021    Retroperitoneal hematoma 07/08/2021    Mesenteric lymphadenopathy 07/08/2021    Acute blood loss anemia 07/08/2021    FINA (acute kidney injury) on CKD stage 2([facility]) 07/08/2021    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[facility]) 06/01/2021    Stasis dermatitis of both legs 04/17/2019    Hammer toe of right foot 02/19/2019    Obesity with body mass index 30 or greater 10/31/2016    Diabetes 1.5, managed as type 2 ([facility]) 10/06/2016    History of hypertension 10/06/2016    Hypercholesteremia 10/06/2016    Diabetic peripheral neuropathy ([facility]) 11/10/2014    Gout 12/06/2007    Type 2 diabetes mellitus ([facility]) 12/06/2007       Physical Therapy:    Weight Bearing Status: Weight Bearing as Tolerated (RLE)  Transfers: Supervision  Amulation Distance (ft): 175 feet  Ambulation: Supervision  Assistive Device for Ambulation:  (no AD)  Wheelchair Mobility Distance:  (n/a)  Number of Stairs: 12  Stair Assistance: Supervision  Discharge Recommendations: Home with:  DC Home with:: Family Support    Date: 9/5/2024  Date of Evaluation: 9/4/2024  Estimated Length of Stay: 7 days     Barriers to Discharge Home: cervical spine precautions, 10 LARY, 10 LARY inside home  PT Interventions to address Barriers: therapeutic exercises, stair management, gait training without AD, TA to improve overall activity tolerance/endurance  Equipments Needs: n/a   PT Plan of Care for the Week: progress to mod I for inroom mobility, LE strengthening, gait training with no AD, stair management   Family Training Needed: No PT family training required.   Discharge Planning: pt did well on initial evaluation only requiring Supervision for functional mobility. Anticipate discharge early next week       Occupational Therapy:  Eating: Independent  Grooming: Supervision  Bathing: Minimal Assistance, Moderate Assistance  Bathing: Minimal Assistance, Moderate  Assistance  Upper Body Dressing: Moderate Assistance  Lower Body Dressing: Incidental Touching  Toileting: Supervision  Toilet Transfer: Supervision  Cognition: Within Defined Limits  Discharge Recommendations: Home with:  DC Home with:: Family Support       09/04/24: Pt is a 68 y.o. male who developed worsening neck pain and periscapular pain was hospitalized from 7/28 to 8/3 for sepsis believed to be secondary to diabetic foot wound, MSSA bacteremia, acute on chronic kidney injury. Pt was dx with MSSA bacteremia and transferred to [facility] to receive skilled therapy services. Pt has a past medical history of Arthritis, Cancer (HCC), Chronic kidney disease, Diabetes mellitus (HCC), Follicular lymphoma (HCC), GERD (gastroesophageal reflux disease), Heart murmur, High cholesterol, Hypertension, Kidney stone, and Obesity. Current precautions include C/S precautions, C/S collar on at all times, fall risk and pain. See flowsheet for details of pts home setup, PLOF and current functional status. Pts impairments include pain, endurance, activity tolerance, and C/S precautions/collar mgmt . These impairments along with limited caregiver support and steps to enter causes the inability to safely complete A/IADLs including Bathing, UB dressing, LB dressing, Toileting, Toilet transfer, brace mgmt and IADL Management. Pt presents with excellent rehab potential with motivation to participate and achieve goal of DC home. Pt is unsafe to DC home at this time, recommending 1 week to achieve independent with assistive device level goals with no DME/AD and C/S collar . Plan to achieve stated goals with interventions focused on ADL Retraining , IADL training , Functional Transfers, Standing tolerance, Standing balance , DME training/education, Family training/education, Energy conservation training/education, healthy coping education, Leisure and social pursuits, and community re-integration. Family training with the wife has been setup  for Friday at 8am with OT only to educate on C/S collar mgmt, pad changing and C/S precautions. During that FT, also plan to assess pts wife to ambulate with pt in the hallways w/o an AD. Spoke with ANDERSON Vargas - wife will not require RN FT as IV ABX supplies will be different with home nsg. Pt has been given Dist Sup IRP. Pt has signed pt privilege plan and is agreeable to call prior to transferring ambulating during daytime to inform nsg staff of the transfer and to call for Close SUP at night. ANDERSON Vargas also aware of the Dist Sup IRP. Pending how the pt performs at this level and how FT goes on Friday - plan to progress to IRP this weekend as pt will be a short LOS.     Speech Therapy:           No notes on file    Nursing Notes:  Appetite: Fair  Diet Type: Diabetic                                                   Bladder: 4 - Minimal Assistance        Bowel: 4 - Minimal Assistance        Pain Location/Orientation: Orientation: Bilateral, Location: Neck  Pain Score: 5                       Hospital Pain Intervention(s): Repositioned, Rest     Medication Management/Safety  Injectable: Insulin    68 y.o male who presented with MSSA bacteremia on 7/28 which had been treated with 7 days of antibiotics likely attributed to R diabetic foot ulcer. Cervical discitis present at C5-6 without evidence of endocarditis. Plan for ancef for 6wks via PICC line.  Had developed afib with RVR and had pressors added. LUCRECIA to be performed as an outpatient when c-collar is discontinued. On metoprolol for HTN. Home Norvasc and losartan on hold. Amiodarone PO for afib. TTE per cadiology to be performed in a month.     This week we will encourage independence with ADLs.  We will monitor labs and vital signs.  We will educate pt/family about repositioning to prevent skin breakdown.  We will assist w repositioning and perform routine skin checks.  We will monitor for adequate pain control.  We will monitor for constipation and medicate pt as  ordered.  We will increase safety awareness and keep pt free from falls.                Case Management:     Discharge Planning  Living Arrangements: Lives w/ Children, Lives w/ Spouse/significant other, Lives w/ Family members  Support Systems: Spouse/significant other, Children, Family members  Assistance Needed: none  Type of Current Residence: Private residence  Current Home Care Services: Yes  Type of Current Home Care Services: None  Pt admitted s/p MSSA bacteremia and has undergone evals with therapies. Pt is functioning well and is expected to have a short rehab stay. Pt is on iv abx and will need on dc. Cm assmt to be completed today with appropriate referrals for dc planning.     Is the patient actively participating in therapies? yes  List any modifications to the treatment plan:     Barriers Interventions   homa Therapy stair training   Buttock pressure injuries, right heel dti Speciality cushion, surgical boot   Iv abx Arrange for home use   New to eliquis Confirm rx coverage for outpt use   Spinal precautions, collar use Compensatory strategies, collar mgmt     Is the patient making expected progress toward goals? yes  List any update or changes to goals:     Medical Goals: Patient will be medically stable for discharge to [facility] [place] upon completion of rehab program and Patient will be able to manage medical conditions and comorbid conditions with medications and follow up upon completion of rehab program    Weekly Team Goals:   Rehab Team Goals  ADL Team Goal: Patient will be independent with ADLs with least restrictive device upon completion of rehab program  Transfer Team Goal: Patient will be independent with transfers with least restrictive device upon completion of rehab program  Locomotion Team Goal: Patient will be independent with locomotion with least restrictive device upon completion of rehab program    Discussion: pt presents with the above barriers and the team is  utilizing the above strategies to progress to safest goals for dc. Wife has already been scheduled to come in for collar  mgmt on Friday. Team anticipates a short stay with return home. Pt is on iv ancef q8 and will need set up for dc to home. Pt will require home nursing for iv follow up. Pt is supervision with transfers, mobility and adls. Home nurisng services to be arranged.     Anticipated Discharge Date:  9/9/24  [facility] Team Members Present:The following team members are supervising care for this patient and were present during this Weekly Team Conference.    Physician: Dr. DePadua, MD  : BEATRIZ Haines  Registered Nurse: Alicia Dexter RN  Physical Therapist: Caroline Krishna DPT  Occupational Therapist: Lynne Davenport MS, OTR/L, CBIS  Speech Therapist:

## 2024-09-05 NOTE — ASSESSMENT & PLAN NOTE
Newly diagnosed in hospital on 8/21/24  Seen by cardiology  Initially on amiodarone infusion- now on PO amiodarone  Also started on metoprolol 25 mg BID  On eliquis for AC

## 2024-09-05 NOTE — ASSESSMENT & PLAN NOTE
Had LUCRECIA 8/27 and moderate pericardial effusion seen  Per cardiology- no plans for drainage  Completed colchicine and prednisone on 8/27  Needs follow up LUCRECIA in 1 month

## 2024-09-05 NOTE — ASSESSMENT & PLAN NOTE
POA to the [facility]  Found to have bilateral sacro-buttock evolving DTI. Blanching on discharge  Wound care consulted - recs appreciated.  Specialty cushion  Regular Turns/offloading  Monitor and continue local wound care as per Wound care recs. Outpatient follow up with them

## 2024-09-05 NOTE — PROGRESS NOTES
Physical Medicine and Rehabilitation Progress Note  Augustus Coffman 68 y.o. male MRN: [id number]  Unit/Bed#: [facility] 973-01 Encounter: [id number]    To Review: Mr. Coffman is a 69yo M with medical history of Diabetes with peripheral neuropathy and diabetic foot wound, HTN, CKD, who presented to [place] on 8/20 for outpatient MRI showing osteomyelitis and diskitis found on an outpatient basis. Recent hospitalization for MSSA bacteremia with R foot wound source treated with IV abx for 7 days. Started on IV abx with plan to treat 6 weeks through 10/2 with transition to Duricef until ESR/CRP normalize. Neurosurgery evaluated and recommended C-collar ATC  due to destruction of the C5 disc space and collapse of C6 SEP. Course c/b pericarditis and pericardial effusion. Did not require window or surgical intervention. Treated with colchicine/steroids. Course also c/b FINA in setting of septic shock which has resolved and Afib/flutter treated with heparin gtt and amio gtt now on oral amio. Admitted to the [facility] on 9/3.     Chief Complaint: poor sleep    Interval History/Subjective:  No acute events overnight. Seen this morning at bedside recliner. Slept poorly due to back pain after laying in bed for so long during hospitalization. No radiation of pain. Feeling well this morning however. Looking forward to discharge date on Monday, as mentioned below. Last BM 9/5    Denies fever, chills, headaches, changes in vision, chest pain, shortness of breath, presyncope, abdominal pain, nausea, diarrhea, constipation, and insomnia.     ROS:  10 point ROS performed and negative unless mentioned in HPI.      Today's Changes:  Labs reviewed- CRP downtrending, ESR slightly up. ALP downtrending  ID recommending continuing weekly labs on antibiotics  Wound care orders placed with R foot wound from last podiatry note. Daily and PRN dermagran to area as well as offloading shoe. Patient not as eager about shoe at this time  Melatonin scheduled as  patient said it helped with sleep  Aqua K pad ordered for back pain at night  Led team conference today --> ADD 9/9 with home nursing for antibiotics    Assessment/Plan:    * MSSA bacteremia  Assessment & Plan  MSSA bacteremia  Patient with prior admission with MSSA bacteremia on 7/28 treated with 7 days of antibiotics  Etiology likely 2/2 right foot ulcer  TTE without evidence of endocarditis  Cleared Blood cultures 8/23    ID consulted, plan for ancef x 6 weeks (end date 10/2 through PICC)  Anticipate transition to oral antibiotic with Duricef on 10/3, to be continued until ESR/CRP improve  LUCRECIA deferred by cardiology, - will obtain in the future when c collar is discontinued  Check limited TTE in 1 month  PT/OT for 3-5 hours a day for 5-6 days/week    Acute osteomyelitis of cervical spine (HCC)  Assessment & Plan  8/20 MRI C-Spine: Imaging findings suspicious for discitis osteomyelitis at C5-C6. 3 mm epidural phlegmon/small abscess is also suspected. There is moderate resultant central stenosis and mild mass effect on the cord  8/25 XR C-Spine Destruction of the C5 to space with collapse of the superior endplate of C6 consistent with discitis osteomyelitis which has progressed from the prior studies. Increased swelling anterior to C5-6.   8/27 MRI - Discitis osteomyelitis again demonstrated at C5-C6   8/29 MRI cervical spine with stable findings and no meningeal enhancement    Continue c-collar per NSGY  Recommending 6 weeks IV antibiotics as above and follow-up repeat upright x-rays ~9/25  Monitor neurologic checks     Pressure injury of deep tissue of buttock  Assessment & Plan  POA to the ARC  Found to have bilateral sacro-buttock evolving DTI.  Wound care consulted - recs appreciated.  Specialty cushion  Regular Turns/offloading  Monitor and continue local wound care as per Wound care recs.    Constipation  Assessment & Plan  Last BM 9/2.   Will de-escalate to docusate BID and miralax daily  Monitor and adjust  as appropriate.    Diabetic ulcer of right foot (HCC)  Assessment & Plan  Lab Results   Component Value Date    HGBA1C 6.8 (H) 07/05/2024       Recent Labs     09/04/24  1632 09/04/24 2053 09/05/24  0625 09/05/24  1055   POCGLU 111 148* 137 108       Blood Sugar Average: Last 72 hrs:  (P) 129.5    Skin Care Plan:  1-Right Foot Wound: Per Podiatry recommendations   2-Turn/reposition q2h or when medically stable for pressure re-distribution on skin .  3-Elevate heels to offload pressure.  4-Moisturize skin daily with skin nourishing cream  5-Ehob cushion in chair when out of bed.  6-Preventative Hydraguard to bilateral sacro-buttocks and heels BID and PRN. ]    Podiatry recommended Dermagran to area daily and PRN, wound care order placed    Acute pericarditis  Assessment & Plan  Presented with classic symptoms and with diffuse ST elevation on 8/22 EKG  8/22 Echo: Limited echo for assessment of endocarditis.  The patient's aortic valve is difficult to assess due to calcification but there is no new significant regurgitation.  The mitral valve has hyperechoic thickening at the tips consistent with most likely calcification, these were seen on the echocardiograms in July.  The tricuspid valve similarly was not well-visualized but no new regurgitation.  The pulmonary valve was not well-visualized. Off note patient has new moderate pericardial effusion without specific echocardiographic signs of tamponade  8/27 TTE - EF 50%, mod focal calcification mitral valve, moderate circumferential pericardial effusion, fluid with fibrinous appearance, no sign of tamponade      Drain deferred due to no tamponade  No thoracic surgery indication.  LUCRECIA when c-collar removed in 6 weeks.  Repeat TTE in 4 weeks.  Completed Colchicine and Prednisone on 8/27  Close hemodynamic monitoring  Outpatient follow up with cardiology for monitoring of effusion    New onset a-fib (HCC)  Assessment & Plan  New onset POA with flutter and fibrillation  2:1 AV conduction  Amio gtt discontinued 8/31  CHADS-VASC score 3    Continue eliquis, lopressor, and amiodarone  Adjust rate control meds as able  Amiodarone to turn into 200 mg daily on 9/6 per cardiology recs    Transaminitis  Assessment & Plan  Mild elevation seen earlier in hospitalization  Has been on schedule tylenol  RUQ US unremarkable for pathology  Recheck tomorrow AM    Neck pain  Assessment & Plan  On Tylenol Q8hr PRN  Robaxin 750mg Q5hr scheduled  Oxycodone 5mg Q4hr PRN  Would not apply heat to this region.  May use other topicals  Monitor and manage as appropriate.    Chronic diastolic CHF (congestive heart failure) ([facility])  Assessment & Plan  Wt Readings from Last 3 Encounters:   09/03/24 111 kg (245 lb 9.6 oz)   09/03/24 111 kg (244 lb 8 oz)   08/29/24 112 kg (248 lb)     See pericarditis for full cardiac plan  Continue beta blocker and atorvastatin  Not on diuretic or ACE/ARB  Adjustment of regimen as per IM  Outpatient f/u with Cards          Ectatic thoracic aorta ([facility])  Assessment & Plan  Seen on incidental CT  Follows with cardiology  No weightbearing above 5 pounds in upper extremities per his outpatient doctor    Vitamin D deficiency  Assessment & Plan  Continue repletion  Normal level of 47 four months ago    FINA (acute kidney injury) on CKD stage 2([facility])  Assessment & Plan  Acute elevation in creatinine noted from baseline of 1-1.2. to ~3.4 with hyperkalemia  In setting of septic shock  Last Cr 1.09 on 9/5  Monitor intermittently     Acute blood loss anemia  Assessment & Plan  Baseline Hgb 10-12  8/29 11.5  Monitor for s/s bleeding  Check cbc tomorrow    Type 2 diabetes mellitus (HCC)  Assessment & Plan  Lab Results   Component Value Date    HGBA1C 6.8 (H) 07/05/2024       Recent Labs     09/04/24  1632 09/04/24  2053 09/05/24  0625 09/05/24  1055   POCGLU 111 148* 137 108       Blood Sugar Average: Last 72 hrs:  (P) 129.5  On jardiance & metformin outpatient, held during FINA  Complicated by  diabetic ulcer as noted to be likely source as above  Continue SSI per IM  Consider adding back orals given improved kidney function to baseline    Hypercholesteremia  Assessment & Plan  Continue statin    History of hypertension  Assessment & Plan  Home: amlodipine 10 mg, losartan 100 mg, Toprol 100 daily  Here: lopressor 25 BID, normotensive  Monitor and adjust as appropriate  Management as per IM        Health Maintenance  #Delirium/Sleep: At risk. Optimize sleep/wake, pain, bowel, bladder management. Avoid deliriogenic meds and physical restraint. Environmental/behavioral interventions.   #Bladder: Voiding and Continent  #Skin/Pressure Injury Prevention: Turn Q2hr in bed, with weight shifts [id number] in wheelchair. Float heels in bed.  #DVT Prophylaxis: Fully anticoagulated on eliquis.  #GI Prophylaxis: n/a  #Code Status: full code  #FEN: diabetic diet with glucerna  #Dispo: Teams 9/5 --> ADD 9/9 with home nursing for antibiotics. Needs f/u with NSGY, ID, cardiology, cardiothoracic surgery, orthopedic surgery, wound care, podiatry, and PCP      Objective:    Functional Update:  PT: sup STS, sup transfers, ambulation 175' without AD sup, incidental touch stairs  OT:ind eating, sup oral hygiene, Isabel full shower, sup ub dressing, sup lb dressing, sup footwear, sup toileting    Allergies per EMR    Physical Exam:  Temp:  [97.7 °F (36.5 °C)-98.1 °F (36.7 °C)] 97.7 °F (36.5 °C)  HR:  [84-99] 88  Resp:  [17-19] 18  BP: ()/(56-67) 102/56  Oxygen Therapy  SpO2: 97 %      Gen: sitting in bedside recliner. Calm, pleasant, no acute distress  HEENT: Moist mucus membranes, Normocephalic/Atraumatic, c-collar in place  Cardiovascular: Regular rate, rhythm, S1/S2. Distal pulses palpable  Heme/Extr: No edema  Pulmonary: Non-labored breathing. Lungs CTAB  : No avilez  GI: Soft, non-tender, non-distended. BS+  Integumentary: R foot wound with granulation tissue but healing well. No drainage, erythema, edema,  "fluctuance  Neuro: AAOx3,  Speech is intact. Appropriate to questioning.   Psych: Normal mood and affect.       Diagnostic Studies: reviewed, no new imaging    Laboratory:  Reviewed         Invalid input(s): \"PLTCT\"    Results from last 7 days   Lab Units 09/05/24  0500 09/01/24  0636 08/31/24  0407   BUN mg/dL 31* 48* 65*   POTASSIUM mmol/L 4.6 4.5 4.4   CHLORIDE mmol/L 102 99 94*   CREATININE mg/dL 1.09 1.09 1.21   AST U/L 39  --   --    ALT U/L 22  --   --             Patient Active Problem List   Diagnosis    Diabetes 1.5, managed as type 2 (HCC)    History of hypertension    Hypercholesteremia    Hammer toe of right foot    Stasis dermatitis of both legs    Diabetic peripheral neuropathy (HCC)    Gout    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HCC)    Obesity with body mass index 30 or greater    Type 2 diabetes mellitus (HCC)    Retroperitoneal hematoma    Mesenteric lymphadenopathy    Acute blood loss anemia    FINA (acute kidney injury) on CKD stage 2(HCC)    GI bleed    Pleural effusion    Chronic venous hypertension (idiopathic) with ulcer of right lower extremity (HCC)    Rash    Stage 2 chronic kidney disease    Hypertensive kidney disease with stage 2 chronic kidney disease    Other proteinuria    Obesity, morbid (HCC)    Follicular lymphoma grade I of lymph nodes of multiple sites (HCC)    Vitamin D deficiency    Stage 3a chronic kidney disease (HCC)    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 (HCC)    Ectatic thoracic aorta (HCC)    Aortic stenosis with trileaflet valve    Sepsis without acute organ dysfunction (HCC)    Acute respiratory failure with hypoxia (HCC)    Acute hyponatremia    Chronic diastolic CHF (congestive heart failure) (HCC)    Encounter for deep vein thrombosis (DVT) prophylaxis    Hyponatremia    Neck pain    Acute osteomyelitis of cervical spine (HCC)    Transaminitis    Acute renal failure with oliguria  (HCC)    MSSA bacteremia    New onset a-fib (HCC)    Acute pericarditis    Diabetic ulcer of " right foot (HCC)    Constipation    Pressure injury of deep tissue of buttock         Medications  Current Facility-Administered Medications   Medication Dose Route Frequency Provider Last Rate    acetaminophen  975 mg Oral Q8H PRN Tino Chambers MD      amiodarone  200 mg Oral Daily Mayte Sanchez PA-C      apixaban  5 mg Oral BID Mayte Sanchez PA-C      atorvastatin  80 mg Oral Daily With Dinner Mayte Sanchez PA-C      bisacodyl  10 mg Rectal Daily PRN Ashley Depadua, MD      ceFAZolin  2,000 mg Intravenous Q8H TONY GriffithsC 2,000 mg (09/05/24 1256)    Cholecalciferol  4,000 Units Oral Daily Mayte Sanchez PA-C      docusate sodium  100 mg Oral BID Tino Chambers MD      insulin lispro  1-5 Units Subcutaneous HS Mayte Sanchez PA-C      insulin lispro  1-6 Units Subcutaneous TID AC Mayte Sanchez PA-C      melatonin  3 mg Oral HS Tino Chambers MD      metFORMIN  1,000 mg Oral BID With Meals Mayte Sanchez PA-C      methocarbamol  750 mg Oral Q6H SRIDHAR Mayte Sanchez PA-C      metoprolol tartrate  25 mg Oral Q12H SRIDHAR Mayte Sanchez PA-C      multivitamin stress formula  1 tablet Oral Daily Mayte Sanchez PA-C      ondansetron  4 mg Oral Q6H PRN Ashley Depadua, MD      oxyCODONE  5 mg Oral Q4H PRN Ashley Depadua, MD      polyethylene glycol  17 g Oral Daily Mayte Sanchez PA-C      senna  2 tablet Oral HS Ashley Depadua, MD            ** Please Note: Fluency Direct voice to text software may have been used in the creation of this document. **

## 2024-09-05 NOTE — ASSESSMENT & PLAN NOTE
Lab Results   Component Value Date    HGBA1C 6.8 (H) 07/05/2024       Recent Labs     09/04/24  1632 09/04/24 2053 09/05/24  0625 09/05/24  1055   POCGLU 111 148* 137 108         Blood Sugar Average: Last 72 hrs:  (P) 129.5    Home regimen: metformin and jardiance  Here: continue metformin, jardiance not on formulary here in hospital.  Continue accuchecks and SSI.

## 2024-09-05 NOTE — CASE MANAGEMENT
Met w/pt and reviewed cm role and rehab routine. Pt resides with his wife in a multi level home, bi level, with 4 homa. Once up 7 stairs pt has everything on one level. Pt has large supportive family and wife will be able to assist w/collar mgmt and iv abx. Cm reviewed team mtg update with dc on Monday. Cm reviewed [facility] services for continued nursing and pt is agreeable to [facility] if they can provide the services he needs. Cm reviewed referral process for iv abx with [facility], the delivery to either home or to his room prior to dc. Cm has already secured scripts from dr plaza for abx and lab work. Pt aware  he will be dc'd in between doses and need to be home for nurse visit. Cm will follow up with pt tomorrow once referrals have been made and prescription plan has been researched.

## 2024-09-05 NOTE — PLAN OF CARE
Problem: PAIN - ADULT  Goal: Verbalizes/displays adequate comfort level or baseline comfort level  Description: Interventions:  - Encourage patient to monitor pain and request assistance  - Assess pain using appropriate pain scale  - Administer analgesics based on type and severity of pain and evaluate response  - Implement non-pharmacological measures as appropriate and evaluate response  - Consider cultural and social influences on pain and pain management  - Notify physician/advanced practitioner if interventions unsuccessful or patient reports new pain  Outcome: Progressing     Problem: INFECTION - ADULT  Goal: Absence or prevention of progression during hospitalization  Description: INTERVENTIONS:  - Assess and monitor for signs and symptoms of infection  - Monitor lab/diagnostic results  - Monitor all insertion sites, i.e. indwelling lines, tubes, and drains  - Monitor endotracheal if appropriate and nasal secretions for changes in amount and color  - [place] appropriate cooling/warming therapies per order  - Administer medications as ordered  - Instruct and encourage patient and family to use good hand hygiene technique  - Identify and instruct in appropriate isolation precautions for identified infection/condition  Outcome: Progressing     Problem: SAFETY ADULT  Goal: Patient will remain free of falls  Description: INTERVENTIONS:  - Educate patient/family on patient safety including physical limitations  - Instruct patient to call for assistance with activity   - Consult OT/PT to assist with strengthening/mobility   - Keep Call bell within reach  - Keep bed low and locked with side rails adjusted as appropriate  - Keep care items and personal belongings within reach  - Initiate and maintain comfort rounds  - Make Fall Risk Sign visible to staff  - Offer Toileting every 2 Hours, in advance of need  - Initiate/Maintain bed/chair alarm  - Obtain necessary fall risk management equipment: nonskid socks  -  Apply yellow socks and bracelet for high fall risk patients  - Consider moving patient to room near nurses station  Outcome: Progressing  Goal: Maintain or return to baseline ADL function  Description: INTERVENTIONS:  -  Assess patient's ability to carry out ADLs; assess patient's baseline for ADL function and identify physical deficits which impact ability to perform ADLs (bathing, care of mouth/teeth, toileting, grooming, dressing, etc.)  - Assess/evaluate cause of self-care deficits   - Assess range of motion  - Assess patient's mobility; develop plan if impaired  - Assess patient's need for assistive devices and provide as appropriate  - Encourage maximum independence but intervene and supervise when necessary  - Involve family in performance of ADLs  - Assess for home care needs following discharge   - Consider OT consult to assist with ADL evaluation and planning for discharge  - Provide patient education as appropriate  Outcome: Progressing  Goal: Maintains/Returns to pre admission functional level  Description: INTERVENTIONS:  - Perform AM-PAC 6 Click Basic Mobility/ Daily Activity assessment daily.  - Set and communicate daily mobility goal to care team and patient/family/caregiver.   - Collaborate with rehabilitation services on mobility goals if consulted  - Perform Range of Motion 3 times a day.  - Reposition patient every 2 hours.  - Dangle patient 3 times a day  - Stand patient 3 times a day  - Ambulate patient 3 times a day  - Out of bed to chair 3 times a day   - Out of bed for meals 3 times a day  - Out of bed for toileting  - Record patient progress and toleration of activity level   Outcome: Progressing     Problem: DISCHARGE PLANNING  Goal: Discharge to home or other facility with appropriate resources  Description: INTERVENTIONS:  - Identify barriers to discharge w/patient and caregiver  - Arrange for needed discharge resources and transportation as appropriate  - Identify discharge learning  needs (meds, wound care, etc.)  - Arrange for interpretive services to assist at discharge as needed  - Refer to Case Management Department for coordinating discharge planning if the patient needs post-hospital services based on physician/advanced practitioner order or complex needs related to functional status, cognitive ability, or social support system  Outcome: Progressing     Problem: Prexisting or High Potential for Compromised Skin Integrity  Goal: Skin integrity is maintained or improved  Description: INTERVENTIONS:  - Identify patients at risk for skin breakdown  - Assess and monitor skin integrity  - Assess and monitor nutrition and hydration status  - Monitor labs   - Assess for incontinence   - Turn and reposition patient  - Assist with mobility/ambulation  - Relieve pressure over bony prominences  - Avoid friction and shearing  - Provide appropriate hygiene as needed including keeping skin clean and dry  - Evaluate need for skin moisturizer/barrier cream  - Collaborate with interdisciplinary team   - Patient/family teaching  - Consider wound care consult   Outcome: Progressing     Problem: Nutrition/Hydration-ADULT  Goal: Nutrient/Hydration intake appropriate for improving, restoring or maintaining nutritional needs  Description: Monitor and assess patient's nutrition/hydration status for malnutrition. Collaborate with interdisciplinary team and initiate plan and interventions as ordered.  Monitor patient's weight and dietary intake as ordered or per policy. Utilize nutrition screening tool and intervene as necessary. Determine patient's food preferences and provide high-protein, high-caloric foods as appropriate.     INTERVENTIONS:  - Monitor oral intake, urinary output, labs, and treatment plans  - Assess nutrition and hydration status and recommend course of action  - Evaluate amount of meals eaten  - Assist patient with eating if necessary   - Allow adequate time for meals  - Recommend/ encourage  appropriate diets, oral nutritional supplements, and vitamin/mineral supplements  - Order, calculate, and assess calorie counts as needed  - Recommend, monitor, and adjust tube feedings and TPN/PPN based on assessed needs  - Assess need for intravenous fluids  - Provide specific nutrition/hydration education as appropriate  - Include patient/family/caregiver in decisions related to nutrition  Outcome: Progressing

## 2024-09-05 NOTE — PCC PHYSICAL THERAPY
Date: 9/5/2024  Date of Evaluation: 9/4/2024  Estimated Length of Stay: 7 days     Barriers to Discharge Home: cervical spine precautions, 10 LARY, 10 LARY inside home  PT Interventions to address Barriers: therapeutic exercises, stair management, gait training without AD, TA to improve overall activity tolerance/endurance  Equipments Needs: n/a   PT Plan of Care for the Week: progress to mod I for inroom mobility, LE strengthening, gait training with no AD, stair management   Family Training Needed: No PT family training required.   Discharge Planning: pt did well on initial evaluation only requiring Supervision for functional mobility. Anticipate discharge early next week

## 2024-09-06 ENCOUNTER — TELEPHONE (OUTPATIENT)
Dept: NEPHROLOGY | Facility: CLINIC | Age: 69
End: 2024-09-06

## 2024-09-06 LAB
BASOPHILS # BLD AUTO: 0.06 THOUSANDS/ÂΜL (ref 0–0.1)
BASOPHILS # BLD AUTO: 0.09 THOUSANDS/ÂΜL (ref 0–0.1)
BASOPHILS NFR BLD AUTO: 1 % (ref 0–1)
BASOPHILS NFR BLD AUTO: 1 % (ref 0–1)
EOSINOPHIL # BLD AUTO: 0.13 THOUSAND/ÂΜL (ref 0–0.61)
EOSINOPHIL # BLD AUTO: 0.14 THOUSAND/ÂΜL (ref 0–0.61)
EOSINOPHIL NFR BLD AUTO: 1 % (ref 0–6)
EOSINOPHIL NFR BLD AUTO: 1 % (ref 0–6)
ERYTHROCYTE [DISTWIDTH] IN BLOOD BY AUTOMATED COUNT: 18.6 % (ref 11.6–15.1)
ERYTHROCYTE [DISTWIDTH] IN BLOOD BY AUTOMATED COUNT: 18.6 % (ref 11.6–15.1)
GLUCOSE SERPL-MCNC: 132 MG/DL (ref 65–140)
HCT VFR BLD AUTO: 30 % (ref 36.5–49.3)
HCT VFR BLD AUTO: 31 % (ref 36.5–49.3)
HGB BLD-MCNC: 9 G/DL (ref 12–17)
HGB BLD-MCNC: 9.2 G/DL (ref 12–17)
IMM GRANULOCYTES # BLD AUTO: 0.07 THOUSAND/UL (ref 0–0.2)
IMM GRANULOCYTES # BLD AUTO: 0.1 THOUSAND/UL (ref 0–0.2)
IMM GRANULOCYTES NFR BLD AUTO: 1 % (ref 0–2)
IMM GRANULOCYTES NFR BLD AUTO: 1 % (ref 0–2)
LYMPHOCYTES # BLD AUTO: 0.75 THOUSANDS/ÂΜL (ref 0.6–4.47)
LYMPHOCYTES # BLD AUTO: 0.98 THOUSANDS/ÂΜL (ref 0.6–4.47)
LYMPHOCYTES NFR BLD AUTO: 10 % (ref 14–44)
LYMPHOCYTES NFR BLD AUTO: 7 % (ref 14–44)
MCH RBC QN AUTO: 24.9 PG (ref 26.8–34.3)
MCH RBC QN AUTO: 25.4 PG (ref 26.8–34.3)
MCHC RBC AUTO-ENTMCNC: 29.7 G/DL (ref 31.4–37.4)
MCHC RBC AUTO-ENTMCNC: 30 G/DL (ref 31.4–37.4)
MCV RBC AUTO: 83 FL (ref 82–98)
MCV RBC AUTO: 86 FL (ref 82–98)
MONOCYTES # BLD AUTO: 0.73 THOUSAND/ÂΜL (ref 0.17–1.22)
MONOCYTES # BLD AUTO: 0.76 THOUSAND/ÂΜL (ref 0.17–1.22)
MONOCYTES NFR BLD AUTO: 7 % (ref 4–12)
MONOCYTES NFR BLD AUTO: 7 % (ref 4–12)
NEUTROPHILS # BLD AUTO: 8.03 THOUSANDS/ÂΜL (ref 1.85–7.62)
NEUTROPHILS # BLD AUTO: 8.82 THOUSANDS/ÂΜL (ref 1.85–7.62)
NEUTS SEG NFR BLD AUTO: 80 % (ref 43–75)
NEUTS SEG NFR BLD AUTO: 83 % (ref 43–75)
NRBC BLD AUTO-RTO: 0 /100 WBCS
NRBC BLD AUTO-RTO: 0 /100 WBCS
PLATELET # BLD AUTO: 262 THOUSANDS/UL (ref 149–390)
PLATELET # BLD AUTO: 268 THOUSANDS/UL (ref 149–390)
PMV BLD AUTO: 10.3 FL (ref 8.9–12.7)
PMV BLD AUTO: 10.7 FL (ref 8.9–12.7)
RBC # BLD AUTO: 3.61 MILLION/UL (ref 3.88–5.62)
RBC # BLD AUTO: 3.62 MILLION/UL (ref 3.88–5.62)
WBC # BLD AUTO: 10.04 THOUSAND/UL (ref 4.31–10.16)
WBC # BLD AUTO: 10.62 THOUSAND/UL (ref 4.31–10.16)

## 2024-09-06 PROCEDURE — 82948 REAGENT STRIP/BLOOD GLUCOSE: CPT

## 2024-09-06 PROCEDURE — 99233 SBSQ HOSP IP/OBS HIGH 50: CPT | Performed by: PHYSICAL MEDICINE & REHABILITATION

## 2024-09-06 PROCEDURE — 85025 COMPLETE CBC W/AUTO DIFF WBC: CPT | Performed by: INTERNAL MEDICINE

## 2024-09-06 PROCEDURE — 99232 SBSQ HOSP IP/OBS MODERATE 35: CPT | Performed by: PHYSICIAN ASSISTANT

## 2024-09-06 PROCEDURE — 97535 SELF CARE MNGMENT TRAINING: CPT

## 2024-09-06 PROCEDURE — 97530 THERAPEUTIC ACTIVITIES: CPT

## 2024-09-06 PROCEDURE — 85025 COMPLETE CBC W/AUTO DIFF WBC: CPT

## 2024-09-06 PROCEDURE — 99233 SBSQ HOSP IP/OBS HIGH 50: CPT | Performed by: INTERNAL MEDICINE

## 2024-09-06 RX ORDER — METHOCARBAMOL 750 MG/1
750 TABLET, FILM COATED ORAL EVERY 6 HOURS PRN
Status: DISCONTINUED | OUTPATIENT
Start: 2024-09-06 | End: 2024-09-09 | Stop reason: HOSPADM

## 2024-09-06 RX ADMIN — CEFAZOLIN SODIUM 2000 MG: 2 SOLUTION INTRAVENOUS at 21:37

## 2024-09-06 RX ADMIN — APIXABAN 5 MG: 5 TABLET, FILM COATED ORAL at 09:10

## 2024-09-06 RX ADMIN — METHOCARBAMOL 750 MG: 750 TABLET ORAL at 05:39

## 2024-09-06 RX ADMIN — DOCUSATE SODIUM 100 MG: 100 CAPSULE, LIQUID FILLED ORAL at 09:10

## 2024-09-06 RX ADMIN — CEFAZOLIN SODIUM 2000 MG: 2 SOLUTION INTRAVENOUS at 05:39

## 2024-09-06 RX ADMIN — METFORMIN HYDROCHLORIDE 1000 MG: 500 TABLET ORAL at 09:08

## 2024-09-06 RX ADMIN — ATORVASTATIN CALCIUM 80 MG: 80 TABLET, FILM COATED ORAL at 17:12

## 2024-09-06 RX ADMIN — POLYETHYLENE GLYCOL 3350 17 G: 17 POWDER, FOR SOLUTION ORAL at 09:24

## 2024-09-06 RX ADMIN — DOCUSATE SODIUM 100 MG: 100 CAPSULE, LIQUID FILLED ORAL at 17:12

## 2024-09-06 RX ADMIN — B-COMPLEX W/ C & FOLIC ACID TAB 1 TABLET: TAB at 09:10

## 2024-09-06 RX ADMIN — CEFAZOLIN SODIUM 2000 MG: 2 SOLUTION INTRAVENOUS at 12:41

## 2024-09-06 RX ADMIN — Medication 4000 UNITS: at 09:09

## 2024-09-06 RX ADMIN — AMIODARONE HYDROCHLORIDE 200 MG: 200 TABLET ORAL at 09:27

## 2024-09-06 RX ADMIN — METFORMIN HYDROCHLORIDE 1000 MG: 500 TABLET ORAL at 17:11

## 2024-09-06 RX ADMIN — Medication 3 MG: at 21:37

## 2024-09-06 RX ADMIN — APIXABAN 5 MG: 5 TABLET, FILM COATED ORAL at 17:12

## 2024-09-06 RX ADMIN — SENNOSIDES 17.2 MG: 8.6 TABLET, FILM COATED ORAL at 21:37

## 2024-09-06 NOTE — ASSESSMENT & PLAN NOTE
Newly diagnosed in hospital on 8/21/24  Seen by cardiology  Initially on amiodarone infusion- now on PO amiodarone  Also started on metoprolol 25 mg BID - held this AM due to soft BP.  On eliquis for AC

## 2024-09-06 NOTE — PROGRESS NOTES
Progress Note - Infectious Disease   Augustus Coffman 68 y.o. male MRN: [id number]  Unit/Bed#: -01 Encounter: [id number]      Impression/Plan:  1.  MSSA bacteremia.  Felt to be a primary foot source.  Complicated by cervical spine infection below.  2D echo limited.  Concern remains for endovascular/perivalvular complications given possible prolonged bacteremia. Patient also too high risk for LUCRECIA now.  Has cleared bacteremia and is clinically improving.  -Continue cefazolin through 10/2/2024 to complete 6 weeks of treatment  -Weekly labs with CBCD, creatinine, ESR and CRP on antibiotic  -Anticipate transition to oral antibiotic with Duricef on 10/3  -Will likely continue oral antibiotic until ESR/CRP improve  -Further cardiac imaging and follow-up with cardiology as outpatient  -Messaged the office about follow-up and outpatient lab testing, and provided prescriptions to case management.  -Possible discharge home on IV antibiotics 9/9/2024 if patient continues to do well     2.  C5-C6 discitis/osteomyelitis with epidural phlegmon/abscess.  Found on outpatient imaging.  As a complication of MSSA bacteremia.  Patient fortunately without any focal neurologic deficits.  No other lesions on MRI T and L-spine.  Repeat MRI C-spine now without any epidural phlegmon/abscess.  Only paravertebral abscess remains.  Neck spasms improved.  Sedimentation rate 95 on 9/5/2024 although CRP has decreased to 52.  -Neurosurgical evaluation noted, follow-up outpatient  -Repeat MR imaging as outpatient  -Antibiotics as above  -Recheck sedimentation rate and CRP tomorrow and weekly  -Serial exams  -Anticipate prolonged oral antibiotic course after intravenous done     3 .  Acute pericarditis with new pericardial effusion.  Patient developed abrupt onset of chest pain with ST elevations.  Troponins negative.  Clinical picture seems consistent with acute pericarditis.  Uncertain however if he has a purulent pericarditis but this would  be unlikely based upon the clinical presentation and time course.    -Repeat cardiac imaging as outpatient with cardiology  -No additional ID workup for now     4.  Chronic kidney disease.  Seems to be relatively stable without any current worsening of the renal function.  -Nephrology follow-up  -Full dose IV antibiotics  -Recheck BMP to make sure no worsening  -Volume management     5.  Acute transaminitis.  Acute elevation in LFTs noted likely due to the above.  Right upper quadrant ultrasound unremarkable.  Improving.  Much improved LFTs  -No additional ID workup needed for now     6.  Chronic right foot ulcer with hardware present.  No overt infection of the soft tissue and no deep tracking to suggest osteomyelitis.  -Local wound care  -Podiatry follow-up     7.  Type 2 diabetes and obesity.  Hemoglobin A1c of 6.8.  BMI of 34.  Both as risk factors for recurrent infection.  -Maintain good diabetic control.    Discussed with the primary service the plan to continue the cefazolin and they agree with the plan.    I have spent a total time of 50 minutes in caring for this patient on the day of the visit/encounter including Instructions for management, Patient and family education, Impressions, Counseling / Coordination of care, Documenting in the medical record, Reviewing / ordering tests, medicine, procedures  , Obtaining or reviewing history  , and Communicating with other healthcare professionals .     Infectious disease service will see the patient again 2024.  Please call if questions.    Antibiotics:  Cefazolin 18    Subjective:  Patient has no fever, chills, sweats; no nausea, vomiting, diarrhea; no cough, shortness of breath; no pain. No new symptoms.    Objective:  Vitals:  Temp:  [97.4 °F (36.3 °C)-97.7 °F (36.5 °C)] 97.6 °F (36.4 °C)  HR:  [78-88] 88  Resp:  [17-18] 17  BP: ()/(54-69) 88/54  SpO2:  [97 %-100 %] 98 %  Temp (24hrs), Av.6 °F (36.4 °C), Min:97.4 °F (36.3 °C), Max:97.7 °F (36.5  °C)  Current: Temperature: 97.6 °F (36.4 °C)    Physical Exam:   General Appearance:  Alert, interactive, nontoxic, no acute distress.   Throat: Oropharynx moist without lesions.    Lungs:   Clear to auscultation bilaterally; no wheezes, rhonchi or rales; respirations unlabored   Heart:  RRR; no murmur, rub or gallop   Abdomen:   Soft, non-tender, non-distended, positive bowel sounds.     Extremities: No clubbing, cyanosis or edema   Skin: No new rashes or lesions. No draining wounds noted.       Labs, Imaging, & Other studies:   All pertinent labs and imaging studies were personally reviewed  Results from last 7 days   Lab Units 09/06/24  0948 09/06/24  0547   WBC Thousand/uL 10.62* 10.04   HEMOGLOBIN g/dL 9.2* 9.0*   PLATELETS Thousands/uL 268 262     Results from last 7 days   Lab Units 09/05/24  0500 09/01/24  0636 08/31/24  0407   SODIUM mmol/L 139 138 137   POTASSIUM mmol/L 4.6 4.5 4.4   CHLORIDE mmol/L 102 99 94*   CO2 mmol/L 25 31 33*   BUN mg/dL 31* 48* 65*   CREATININE mg/dL 1.09 1.09 1.21   EGFR ml/min/1.73sq m 69 69 61   CALCIUM mg/dL 9.2 9.5 9.7   AST U/L 39  --   --    ALT U/L 22  --   --    ALK PHOS U/L 116*  --   --              Results from last 7 days   Lab Units 09/05/24  0500   CRP mg/L 52.6*

## 2024-09-06 NOTE — PROGRESS NOTES
09/06/24 0930   Pain Assessment   Pain Assessment Tool 0-10   Pain Score 3   Pain Onset/Description Onset: Ongoing   Hospital Pain Intervention(s) Repositioned;Emotional support;Rest   Restrictions/Precautions   Precautions Fall Risk;Pain;Spinal precautions  (5lb weight lifting restriction bilateral UEs)   RUE Weight Bearing Per Order Other  (5 lb WBing limit)   LUE Weight Bearing Per Order Other  (5 lb WBing limit)   RLE Weight Bearing Per Order WBAT   Braces or Orthoses C/S Collar   Cognition   Overall Cognitive Status WFL   Arousal/Participation Alert   Attention Within functional limits   Orientation Level Oriented X4   Memory Within functional limits   Following Commands Follows multistep commands without difficulty   Sit to Stand   Type of Assistance Needed Independent   Sit to Stand CARE Score 6   Bed-Chair Transfer   Type of Assistance Needed Independent   Chair/Bed-to-Chair Transfer CARE Score 6   Walk 10 Feet   Type of Assistance Needed Independent   Comment no AD   Walk 10 Feet CARE Score 6   Walk 50 Feet with Two Turns   Type of Assistance Needed Independent   Comment no AD   Walk 50 Feet with Two Turns CARE Score 6   Walk 150 Feet   Type of Assistance Needed Set-up / clean-up   Comment dist S   Walk 150 Feet CARE Score 5   Walking 10 Feet on Uneven Surfaces   Type of Assistance Needed Set-up / clean-up   Comment DS on outside ramp, grass and hills.   Walking 10 Feet on Uneven Surfaces CARE Score 5   Ambulation   Primary Mode of Locomotion Prior to Admission Walk   Distance Walked (feet) 250 ft  (x2, x400')   Assist Device   (none)   Gait Pattern Forward Flexion;Lateral deviation;Shuffle   Limitations Noted In Posture;Midline Orientation;Speed   Provided Assistance with: Direction   Walk Assist Level Independent;Distant Supervision   Does the patient walk? 2. Yes   Wheel 50 Feet with Two Turns   Reason if not Attempted Activity not applicable   Wheel 50 Feet with Two Turns CARE Score 9   Wheel 150  Feet   Reason if not Attempted Activity not applicable   Wheel 150 Feet CARE Score 9   Toilet Transfer   Type of Assistance Needed Independent   Toilet Transfer CARE Score 6   Therapeutic Interventions   Other Pt amb to ground floor and outside. Once outside pt amb to middle level of ramp with DS. Pt did take short rest break on bench. Once rested he amb up the hill past the cancer center and towards the ED. Once on the hill he amb over grass and small incline in grass back towards cancer center. Pt took aother short rest break prior to amb back inside to gift shop. Pt amb throughout [employer] shop with no issues. VC's at times for head as he is flexed at neck.   Equipment Use   NuStep L2 x15 min BLE only   Assessment   Treatment Assessment Pt participated in skilled PT session with increased focus on outside amb, amb in tight areas, safety awareness and endurance training. Pt is now IRP's with no AD in preporation for discharge home Monday 9/9. Pt remains I to DS with all gait on even/uneven surfaces. Pt showed no signs of LOB with gait but is limited by kyphotic posture. Pt will cont POC as tolerated with cont focus on balance, coordination and righting reactions to decrease burden of care.   Problem List Decreased strength;Decreased range of motion;Decreased endurance;Impaired balance;Decreased mobility;Pain;Orthopedic restrictions   Barriers to Discharge Inaccessible home environment   Plan   Treatment/Interventions LE strengthening/ROM   Progress Progressing toward goals   PT Therapy Minutes   PT Time In 0930   PT Time Out 1100   PT Total Time (minutes) 90   PT Mode of treatment - Individual (minutes) 90   PT Mode of treatment - Concurrent (minutes) 0   PT Mode of treatment - Group (minutes) 0   PT Mode of treatment - Co-treat (minutes) 0   PT Mode of Treatment - Total time(minutes) 90 minutes   PT Cumulative Minutes 270   Therapy Time missed   Time missed? No

## 2024-09-06 NOTE — TELEPHONE ENCOUNTER
Pt called since he was rehospitalized on 9/3/24 to move appt a few days from 9/23 to 9/26 because of other appts. He also asked if appt is still necessary based on the rehospitalization diagnosis. Can we verify that we still need to see him?

## 2024-09-06 NOTE — ASSESSMENT & PLAN NOTE
Lab Results   Component Value Date    HGBA1C 6.8 (H) 07/05/2024       Recent Labs     09/05/24  1055 09/05/24  1538 09/05/24  2115 09/06/24  0645   POCGLU 108 104 108 132         Blood Sugar Average: Last 72 hrs:  (P) 125.8604122844109197    Home regimen: metformin and jardiance  Here: continue metformin, jardiance not on formulary here in hospital.  Blood sugar checks all acceptable. Will stop accuchecks.

## 2024-09-06 NOTE — TELEPHONE ENCOUNTER
Good Afternoon, Dr. Heath    Patient of yours called the office regarding his appointment with you on 09/23/2024 @ 2:30 pm. Patient would like to know if he can have an earlier   appointment due to Patient was admitted to [facility] back in September for an Infection in his blood that affected his kidneys and he is in a strict regimen of IV and Antibiotics TID a day. Per patient he stated that due to his infection in blood he was assigned a nurse to go to his home and teach his wife how to administered the IV and Antibiotics.     Please Advise    Thank you

## 2024-09-06 NOTE — CASE MANAGEMENT
Pts oop costs for home infusion services are as follows:  $10/week copay, supplies are $35/day. The total through 10/2 is $870, with half payment needed upfront, $435. Beau met w/pt and reviewed the above. He is in understanding and agreement of services.

## 2024-09-06 NOTE — ASSESSMENT & PLAN NOTE
FINA during hospitalization, creat peaked at 3.38. now down to 1.09  Was seen by nephrology  Had avilez which was subsequently removed  Avoid nephrotoxins  Monitor labs  Follow up with nephrology after discharge   What Type Of Note Output Would You Prefer (Optional)?: Bullet Format Hpi Title: Evaluation of Skin Lesions How Severe Are Your Spot(S)?: moderate Have Your Spot(S) Been Treated In The Past?: has not been treated

## 2024-09-06 NOTE — DISCHARGE INSTR - AVS FIRST PAGE
DISCHARGE INSTRUCTIONS: [facility] ACUTE REHABILITATION [place]    Bring these instructions with you to your Outpatient Physician appointments so they can order and follow-up any additional lab work or imaging recommended at time of discharge.    Resume follow-up with all your prior providers that you have established care prior to this hospitalization.  Discuss with primary care physician (PCP) if you have additional questions.     CBC with differential, creatinine, sedimentation rate, CRP weekly while on the cefazolin    Remove central line after last dose of cefazolin 10/2/2024    It  is you or your caregivers responsibility to obtain follow-up MEDICATION REFILLS  As indicated through your Primary Care Physician (PCP) and other outpatient specialty provider(s) after discharge.  Please follow-up with your PCP as soon as possible after discharge to set-up follow-up management and when appropriate refills.      You remain a fall and injury risk which could be severe.  - Your risk of fall has decreased however since admission to acute rehab.  Caregiver training has been completed with our staff.  - Appropriate supervision +/- assistance as instructed during your rehab course is recommended to decrease risk of fall and injury.    - Continue skilled therapy as discussed after discharge to further decrease this risk    If you (or your health care proxy) have any questions or concerns regarding your acute rehabilitation stay including issues with medications, rehabilitation, and follow-up plan, please call:          [facility] Acute Rehabilitation Unit in [place] at [phone] or [phone].    Should you develop fevers, chills, new weakness, changes in sensation, difficulty speaking, facial weakness, confusion, shortness of breath, chest pain, or other concerning symptoms please call 911 and/or obtain transportation to nearest ER immediately.    Should you develop worsening pain, swelling, or  "drainage notify your surgeon right away or obtain transportation to nearest ER for evaluation.    Should you develop feelings of significant hopelessness, severe depression, severe anxiety, or suicide you should call 911 or obtain transportation to nearest ER.    24-7 Nationwide suicide and crisis lifeline call \"338\"  National Suicide Prevention Lifeline is 1-326.776.4211 and is available 24 hrs/7 days a week.   [facility] Crisis [phone] is available 24 hrs/7 days for mental health  [facility] Crisis [phone] is available 24 hrs/7 days for mental health   [facility] Crisis [phone]    PHYSICIANS to see:  Please see your doctors listed in the follow up providers section of your discharge paperwork, and take the discharge paperwork with you to your appointments.    Home health has been ordered for you:  Your home health agency should reach out to you or your family soon to arrange follow-up.    (If [facility] is your provider their phone number is [phone])    If you are unable to get in touch with home health you may contact your  at [phone]. [place]      SKIN CARE INSTRUCTIONS to follow:  Monitor skin for increased redness or breadown and promptly notify your physician should these develop    If instructed while in [facility] - be sure you stand +/- walk every 1-2 hours and if advised use appropriate supervision/assistance to optimally offload your buttock/sacral region.  While seated or lying in bed shift positions and from side to side often.  Turn patient (yourself) fully every 2 hours while in bed.      Buttocks/Sacrum  Turn as full as possible off sacrum/buttocks every 2 hours  Use Cushion while in chair or wheelchair  Weight shift every 10-15 minutes while in chair  Keep skin clean and dry as possible.  Remove wet or soiled clothing/linens promptly  Use barrier cream or similar 2 times per day   Monitor skin for increased " breakdown which you are at risk of and notify nursing, PCP, or other physician providers should this occur right away    Heels/bony edges of feet  Float heels off edge of pillow for added pressure relief.  Monitor heels and bony protuberances and notify physician or nursing for any increased redness, bogginess, or breakdown    WEIGHTBEARING/ACTIVITY PRECAUTIONS to follow:  Follow post-surgical restrictions as outlined by your surgeon.  Spinal precautions  Please wear your cervical collar at all times until cleared by spine surgery.    Alcohol restrictions:  You are recommended to not drink alcohol at this time unless cleared by an outpatient physician.  Drinking alcohol in your current functional condition can increase your risk of injury which could be severe.  Drinking alcohol given your current health problems can lead to increased medical complications which could be severe.    Combining alcohol with your current medications can increase your risk of injury which could be severe.      Smoking restrictions:  You are recommended to not smoke nicotine.  Smoking increases your risk of heart attack, stroke, emphysema/COPD, and lung cancer.      ------------------------------------------------------------------------------------------------------------  ------------------------------------------------------------------------------------------------------------    MEDICATIONS:  Please see a full list of your medications outlined in the After Visit Summary that is attached to these Discharge Instructions.  Please note changes may have been made to your medications please refer to your discharge paperwork for your current medications and take this list with you to all your doctors appointments for your doctors to review.  Please do not resume a home medication unless the medication reconciliation sheet indicates to do so, please do not assume that a medication that you were given a prescription for is the same as a  medication you have at home based on both medications having the same name as dosages and frequency may have changed.      Unless specifically noted in your medication list provided to you in your discharge paper work do not resume prior vitamins, minerals, or supplements you may have been taking prior to your hospitalization unless instructed by an outpatient physician in the future.  Discuss with your primary care at next visit if applicable.      Methocarbamol (Robaxin) pain/muscle spasm medication has been used to help your acute pain and spasms.  You tolerated this medication adequately during your recent hospital stay.     - Do not take with alcohol (or marijuana/cannabis) while on this medication as this can cause increased confusion, breathing problems, falls, and severe injury.  - Follow-up with your primary care physician within 1-2 weeks as well as relevant specialty physician for additional management of your medical conditions, potential refills, or adjustments of this medication.        Please note a summary of your hospital stay with relevant information for your doctors will try to be sent to them.  Please confirm with your doctors at your follow up visits that they have received this summary and have them contact [facility] Medical Records if they have not received them along with any other medical records they may require.     [facility] Phone Number:  [phone]

## 2024-09-06 NOTE — ASSESSMENT & PLAN NOTE
Lab Results   Component Value Date    HGBA1C 6.8 (H) 07/05/2024       Recent Labs     09/05/24  1055 09/05/24  1538 09/05/24  2115 09/06/24  0645   POCGLU 108 104 108 132         Blood Sugar Average: Last 72 hrs:  (P) 125.3595401168668305    Follows with wound care as outpatient  Was seen by podiatry during recent hospitalization  Continue local wound care  Follow up with wound and podiatry

## 2024-09-06 NOTE — PROGRESS NOTES
Physical Medicine and Rehabilitation Progress Note  Augustus Coffman 68 y.o. male MRN: [id number]  Unit/Bed#: [facility] 973-01 Encounter: [id number]    To Review: Mr. Coffman is a 67yo M with medical history of Diabetes with peripheral neuropathy and diabetic foot wound, HTN, CKD, who presented to [facility] on 8/20 for outpatient MRI showing osteomyelitis and diskitis found on an outpatient basis. Recent hospitalization for MSSA bacteremia with R foot wound source treated with IV abx for 7 days. Started on IV abx with plan to treat 6 weeks through 10/2 with transition to Duricef until ESR/CRP normalize. Neurosurgery evaluated and recommended C-collar ATC  due to destruction of the C5 disc space and collapse of C6 SEP. Course c/b pericarditis and pericardial effusion. Did not require window or surgical intervention. Treated with colchicine/steroids. Course also c/b FINA in setting of septic shock which has resolved and Afib/flutter treated with heparin gtt and amio gtt now on oral amio. Admitted to the [facility] on 9/3.     Chief Complaint: low blood pressures    Interval History/Subjective:  No acute events overnight. Seen this morning at bedside with recliner with wife accompanying him in room. Slept ok, feeling well. Looking forward to therapy. Last BM 9/4    Concerned about his low blood pressures but denies lightheadedness, changes in vision, fevers, headaches, nausea, abdominal pain. Went over plan as below and he is agreeable    ROS:  10 point ROS performed and negative unless mentioned in HPI.      Today's Changes:  Discontinue accuchecks --> has not required insulin in a few days. Continue metformin  Hypotension: has had blood pressures of similar nature to this 88/54 (96/57 two days ago). He is asymptomatic and his Lopressor is being held for parameters this morning. Will also make his Robaxin PRN instead of scheduled as this can attribute to hypotension. Will continue to monitor throughout the day but don't anticipate  holding therapy. IM to adjust Lopressor  CBC reviewed- WBC 15.1 --> 10.0, Hgb 11.5 --> 9.0, platelets 415 --> 262. Last check was on 8/29. Rechecked since it was drawn from PICC, but repeat values similar. Has been receiving IV antibiotics q8h so may be dilutional, but also concern for antibiotic effect on bone marrow with developing pancytopenia. Will discuss with ID  Will price check Eliquis --> price check $120 for 3 month supply    Assessment/Plan:    * MSSA bacteremia  Assessment & Plan  MSSA bacteremia  Patient with prior admission with MSSA bacteremia on 7/28 treated with 7 days of antibiotics  Etiology likely 2/2 right foot ulcer  TTE without evidence of endocarditis  Cleared Blood cultures 8/23    ID consulted, plan for ancef x 6 weeks (end date 10/2 through PICC)   - WBC 15.1 --> 10.0, Hgb 11.5 --> 9.0, platelets 415 --> 262. Last check was on 8/29. Rechecked since it was drawn from PICC, but repeat values similar. Has been receiving IV antibiotics q8h so may be dilutional, but also concern for antibiotic effect on bone marrow with developing pancytopenia. Will discuss with ID  Anticipate transition to oral antibiotic with Duricef on 10/3, to be continued until ESR/CRP improve  LUCRECIA deferred by cardiology, - will obtain in the future when c collar is discontinued  Check limited TTE in 1 month  PT/OT for 3-5 hours a day for 5-6 days/week    Acute osteomyelitis of cervical spine (HCC)  Assessment & Plan  8/20 MRI C-Spine: Imaging findings suspicious for discitis osteomyelitis at C5-C6. 3 mm epidural phlegmon/small abscess is also suspected. There is moderate resultant central stenosis and mild mass effect on the cord  8/25 XR C-Spine Destruction of the C5 to space with collapse of the superior endplate of C6 consistent with discitis osteomyelitis which has progressed from the prior studies. Increased swelling anterior to C5-6.   8/27 MRI - Discitis osteomyelitis again demonstrated at C5-C6   8/29 MRI cervical  spine with stable findings and no meningeal enhancement    Continue c-collar per NSGY  Recommending 6 weeks IV antibiotics as above and follow-up repeat upright x-rays ~9/25  Monitor neurologic checks     Pressure injury of deep tissue of buttock  Assessment & Plan  POA to the ARC  Found to have bilateral sacro-buttock evolving DTI.  Wound care consulted - recs appreciated.  Specialty cushion  Regular Turns/offloading  Monitor and continue local wound care as per Wound care recs.    Constipation  Assessment & Plan  Last BM 9/4  Will de-escalate to docusate BID and miralax daily  Monitor and adjust as appropriate.    Diabetic ulcer of right foot (HCC)  Assessment & Plan  Lab Results   Component Value Date    HGBA1C 6.8 (H) 07/05/2024       Recent Labs     09/04/24  1632 09/04/24 2053 09/05/24  0625 09/05/24  1055   POCGLU 111 148* 137 108       Blood Sugar Average: Last 72 hrs:  (P) 129.5    Skin Care Plan:  1-Right Foot Wound: Per Podiatry recommendations   2-Turn/reposition q2h or when medically stable for pressure re-distribution on skin .  3-Elevate heels to offload pressure.  4-Moisturize skin daily with skin nourishing cream  5-Ehob cushion in chair when out of bed.  6-Preventative Hydraguard to bilateral sacro-buttocks and heels BID and PRN. ]    Podiatry recommended Dermagran to area daily and PRN, wound care order placed    Acute pericarditis  Assessment & Plan  Presented with classic symptoms and with diffuse ST elevation on 8/22 EKG  8/22 Echo: Limited echo for assessment of endocarditis.  The patient's aortic valve is difficult to assess due to calcification but there is no new significant regurgitation.  The mitral valve has hyperechoic thickening at the tips consistent with most likely calcification, these were seen on the echocardiograms in July.  The tricuspid valve similarly was not well-visualized but no new regurgitation.  The pulmonary valve was not well-visualized. Off note patient has new  moderate pericardial effusion without specific echocardiographic signs of tamponade  8/27 TTE - EF 50%, mod focal calcification mitral valve, moderate circumferential pericardial effusion, fluid with fibrinous appearance, no sign of tamponade      Drain deferred due to no tamponade  No thoracic surgery indication.  LUCRECIA when c-collar removed in 6 weeks.  Repeat TTE in 4 weeks.  Completed Colchicine and Prednisone on 8/27  Close hemodynamic monitoring  Outpatient follow up with cardiology for monitoring of effusion    New onset a-fib ([facility])  Assessment & Plan  New onset POA with flutter and fibrillation 2:1 AV conduction  Amio gtt discontinued 8/31  CHADS-VASC score 3    Continue eliquis, lopressor, and amiodarone  Adjust rate control meds as able  Amiodarone to turn into 200 mg daily on 9/6 per cardiology recs    Transaminitis  Assessment & Plan  Mild elevation seen earlier in hospitalization  Has been on schedule tylenol  RUQ US unremarkable for pathology  Recheck tomorrow AM    Neck pain  Assessment & Plan  On Tylenol Q8hr PRN  Robaxin 750mg Q5hr scheduled  Oxycodone 5mg Q4hr PRN  Would not apply heat to this region.  May use other topicals  Monitor and manage as appropriate.    Chronic diastolic CHF (congestive heart failure) ([facility])  Assessment & Plan  Wt Readings from Last 3 Encounters:   09/03/24 111 kg (245 lb 9.6 oz)   09/03/24 111 kg (244 lb 8 oz)   08/29/24 112 kg (248 lb)     See pericarditis for full cardiac plan  Continue beta blocker and atorvastatin  Not on diuretic or ACE/ARB  Adjustment of regimen as per IM  Outpatient f/u with Cards          Ectatic thoracic aorta ([facility])  Assessment & Plan  Seen on incidental CT  Follows with cardiology  No weightbearing above 5 pounds in upper extremities per his outpatient doctor    Vitamin D deficiency  Assessment & Plan  Continue repletion  Normal level of 47 four months ago    FINA (acute kidney injury) on CKD stage 2([facility])  Assessment & Plan  Acute elevation in  creatinine noted from baseline of 1-1.2. to ~3.4 with hyperkalemia  In setting of septic shock  Last Cr 1.09 on 9/5  Monitor intermittently     Acute blood loss anemia  Assessment & Plan  Baseline Hgb 10-12  8/29 11.5 --> 9/6 9.0  Monitor for s/s bleeding  See bacteremia for plan    Type 2 diabetes mellitus (HCC)  Assessment & Plan  Lab Results   Component Value Date    HGBA1C 6.8 (H) 07/05/2024       Recent Labs     09/04/24  1632 09/04/24 2053 09/05/24  0625 09/05/24  1055   POCGLU 111 148* 137 108       Blood Sugar Average: Last 72 hrs:  (P) 129.5  On jardiance & metformin outpatient, held during FINA  Complicated by diabetic ulcer as noted to be likely source as above  Continue SSI per IM  Consider adding back orals given improved kidney function to baseline    Hypercholesteremia  Assessment & Plan  Continue statin    History of hypertension  Assessment & Plan  Home: amlodipine 10 mg, losartan 100 mg, Toprol 100 daily  Here: lopressor 25 BID, normotensive    Now with hypotension but asymptomatic  Lopressor held for parameters, IM to adjust  Robaxin made PRN  Monitor for symptoms        Health Maintenance  #Delirium/Sleep: At risk. Optimize sleep/wake, pain, bowel, bladder management. Avoid deliriogenic meds and physical restraint. Environmental/behavioral interventions.   #Bladder: Voiding and Continent  #Skin/Pressure Injury Prevention: Turn Q2hr in bed, with weight shifts [id number] in wheelchair. Float heels in bed.  #DVT Prophylaxis: Fully anticoagulated on eliquis.  #GI Prophylaxis: n/a  #Code Status: full code  #FEN: diabetic diet with glucerna  #Dispo: Teams 9/5 --> ADD 9/9 with home nursing for antibiotics. Needs f/u with NSGY, ID, cardiology, cardiothoracic surgery, orthopedic surgery, wound care, podiatry, and PCP      Objective:    Functional Update:  PT: sup STS, sup transfers, ambulation 175' without AD setup, sup 12 stairs, sup bed mobility  OT:ind eating, sup oral hygiene, Isabel full shower, sup ub  dressing, sup lb dressing, sup footwear, sup toileting    Allergies per EMR    Physical Exam:  Temp:  [97.4 °F (36.3 °C)-97.7 °F (36.5 °C)] 97.6 °F (36.4 °C)  HR:  [78-88] 88  Resp:  [17-18] 17  BP: ()/(54-69) 114/56  Oxygen Therapy  SpO2: 98 %      Gen: sitting in bedside recliner. Calm, pleasant, no acute distress  HEENT: Moist mucus membranes, Normocephalic/Atraumatic, c-collar in place  Cardiovascular: Regular rate, rhythm, S1/S2. Distal pulses palpable  Heme/Extr: No edema  Pulmonary: normal chest rise and fall. Lung fields clear without wheezing, rhonchi, or rales  : No avilez  GI: Soft, non-tender, non-distended. BS+  Integumentary: skin is warm and dry. Right foot in offloading shoe  Neuro: AAOx3,  Speech is intact. Appropriate to questioning.   Psych: Normal mood and affect.       Diagnostic Studies: reviewed, no new imaging    Laboratory:  Reviewed   Results from last 7 days   Lab Units 09/06/24  0948 09/06/24  0547   HEMOGLOBIN g/dL 9.2* 9.0*   HEMATOCRIT % 31.0* 30.0*   WBC Thousand/uL 10.62* 10.04       Results from last 7 days   Lab Units 09/05/24  0500 09/01/24  0636 08/31/24  0407   BUN mg/dL 31* 48* 65*   POTASSIUM mmol/L 4.6 4.5 4.4   CHLORIDE mmol/L 102 99 94*   CREATININE mg/dL 1.09 1.09 1.21   AST U/L 39  --   --    ALT U/L 22  --   --             Patient Active Problem List   Diagnosis    Diabetes 1.5, managed as type 2 (HCC)    History of hypertension    Hypercholesteremia    Hammer toe of right foot    Stasis dermatitis of both legs    Diabetic peripheral neuropathy (HCC)    Gout    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HCC)    Obesity with body mass index 30 or greater    Type 2 diabetes mellitus (HCC)    Retroperitoneal hematoma    Mesenteric lymphadenopathy    Acute blood loss anemia    FINA (acute kidney injury) on CKD stage 2(HCC)    GI bleed    Pleural effusion    Chronic venous hypertension (idiopathic) with ulcer of right lower extremity (HCC)    Rash    Stage 2 chronic kidney  disease    Hypertensive kidney disease with stage 2 chronic kidney disease    Other proteinuria    Obesity, morbid (HCC)    Follicular lymphoma grade I of lymph nodes of multiple sites (HCC)    Vitamin D deficiency    Stage 3a chronic kidney disease (HCC)    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 (HCC)    Ectatic thoracic aorta (HCC)    Aortic stenosis with trileaflet valve    Sepsis without acute organ dysfunction (HCC)    Acute respiratory failure with hypoxia (HCC)    Acute hyponatremia    Chronic diastolic CHF (congestive heart failure) (HCC)    Encounter for deep vein thrombosis (DVT) prophylaxis    Hyponatremia    Neck pain    Acute osteomyelitis of cervical spine (HCC)    Transaminitis    Acute renal failure with oliguria  (HCC)    MSSA bacteremia    New onset a-fib (HCC)    Acute pericarditis    Diabetic ulcer of right foot (HCC)    Constipation    Pressure injury of deep tissue of buttock         Medications  Current Facility-Administered Medications   Medication Dose Route Frequency Provider Last Rate    acetaminophen  975 mg Oral Q8H PRN Tino Chambers MD      amiodarone  200 mg Oral Daily Mayte Sanchez PA-C      apixaban  5 mg Oral BID Mayte Sanchez PA-C      atorvastatin  80 mg Oral Daily With Dinner Mayte Sanchez PA-C      bisacodyl  10 mg Rectal Daily PRN Ashley Depadua, MD      ceFAZolin  2,000 mg Intravenous Q8H Mayte Sanchez PA-C 2,000 mg (09/06/24 0539)    Cholecalciferol  4,000 Units Oral Daily Mayte Sanchez PA-C      docusate sodium  100 mg Oral BID Tino Chambers MD      melatonin  3 mg Oral HS Tino Chambers MD      metFORMIN  1,000 mg Oral BID With Meals Mayte Sanchez PA-C      methocarbamol  750 mg Oral Q6H PRN Tino Chambers MD      metoprolol tartrate  25 mg Oral Q12H [facility] Mayte Sanchez PA-C      multivitamin stress formula  1 tablet Oral Daily Mayte Sanchez PA-C      ondansetron  4 mg Oral Q6H PRN Ashley Depadua, MD      oxyCODONE  5 mg Oral Q4H PRN Maryana  Depadua, MD      polyethylene glycol  17 g Oral Daily Mayte Sanchez PA-C      senna  2 tablet Oral HS Ashley Depadua, MD            ** Please Note: Fluency Direct voice to text software may have been used in the creation of this document. **

## 2024-09-06 NOTE — PROGRESS NOTES
[facility]  Progress Note  Name: Augustus Coffman I  MRN: [id number]  Unit/Bed#: -01 I Date of Admission: 9/3/2024   Date of Service: 9/6/2024 I Hospital Day: 3    Assessment & Plan   * MSSA bacteremia  Assessment & Plan  Previously hospitalized for MSSA bacteremia on 7/28, thought to be from heel ulcer and completed a course of antibiotics and was discharged home  Per chart, he was complaining of neck pain during that admission  He was sent to ortho outpatient and had an MRI of his neck that reported osteo and discitis so he was sent to the ED for admission  He was admitted on 8/20 and was seen by ID  LUCRECIA showed no endocarditis  MRI showed discitis/osteo at C5-C6  ID recommending 6 weeks of IV ancef    Needs repeat limited LUCRECIA when c collar is discontinued  Needs CBC and CMP weekly while on antibiotics  Has PICC which can be discontinued when ancef complete  ID following - plan for ancef for 6 weeks thru 10/3 then possibly transition to PO antibiotics until SED rate and CRP improve    Diabetic ulcer of right foot (HCC)  Assessment & Plan  Lab Results   Component Value Date    HGBA1C 6.8 (H) 07/05/2024       Recent Labs     09/05/24  1055 09/05/24  1538 09/05/24  2115 09/06/24  0645   POCGLU 108 104 108 132         Blood Sugar Average: Last 72 hrs:  (P) 125.5182467468386156    Follows with wound care as outpatient  Was seen by podiatry during recent hospitalization  Continue local wound care  Follow up with wound and podiatry    Acute pericarditis  Assessment & Plan  Had LUCRECIA 8/27 and moderate pericardial effusion seen  Per cardiology- no plans for drainage  Completed colchicine and prednisone on 8/27  Needs follow up LUCRECIA in 1 month    New onset a-fib (HCC)  Assessment & Plan  Newly diagnosed in hospital on 8/21/24  Seen by cardiology  Initially on amiodarone infusion- now on PO amiodarone  Also started on metoprolol 25 mg BID - held this AM due to soft BP.  On eliquis  for AC    Transaminitis  Assessment & Plan  Seen on labs in hospital  Improving.  Continue to monitor.    Acute osteomyelitis of cervical spine (HCC)  Assessment & Plan  Seen by neurosurg  Ancef for 6 weeks per ID  Continue use of C-collar  Monitor neuro checks    FINA (acute kidney injury) on CKD stage 2(HCC)  Assessment & Plan  FINA during hospitalization, creat peaked at 3.38. now down to 1.09  Was seen by nephrology  Had avilez which was subsequently removed  Avoid nephrotoxins  Monitor labs  Follow up with nephrology after discharge    Type 2 diabetes mellitus (HCC)  Assessment & Plan  Lab Results   Component Value Date    HGBA1C 6.8 (H) 07/05/2024       Recent Labs     09/05/24  1055 09/05/24  1538 09/05/24  2115 09/06/24  0645   POCGLU 108 104 108 132         Blood Sugar Average: Last 72 hrs:  (P) 125.3232397626382610    Home regimen: metformin and jardiance  Here: continue metformin, jardiance not on formulary here in hospital.  Blood sugar checks all acceptable. Will stop accuchecks.             The above assessment and plan was reviewed and updated as determined by my evaluation of the patient on 9/6/2024.    Labs:   Results from last 7 days   Lab Units 09/06/24  0948 09/06/24  0547   WBC Thousand/uL 10.62* 10.04   HEMOGLOBIN g/dL 9.2* 9.0*   HEMATOCRIT % 31.0* 30.0*   PLATELETS Thousands/uL 268 262     Results from last 7 days   Lab Units 09/05/24  0500 09/01/24  0636   SODIUM mmol/L 139 138   POTASSIUM mmol/L 4.6 4.5   CHLORIDE mmol/L 102 99   CO2 mmol/L 25 31   BUN mg/dL 31* 48*   CREATININE mg/dL 1.09 1.09   CALCIUM mg/dL 9.2 9.5             Results from last 7 days   Lab Units 09/06/24  0645 09/05/24  2115 09/05/24  1538   POC GLUCOSE mg/dl 132 108 104       Imaging  No orders to display       Review of Scheduled Meds:  Current Facility-Administered Medications   Medication Dose Route Frequency Provider Last Rate    acetaminophen  975 mg Oral Q8H PRN Tino Chambers MD      amiodarone  200 mg Oral  Daily Mayte Sanchez PA-C      apixaban  5 mg Oral BID Mayte Sanchez PA-C      atorvastatin  80 mg Oral Daily With Dinner Mayte Sanchez PA-C      bisacodyl  10 mg Rectal Daily PRN Ashley Depadua, MD      ceFAZolin  2,000 mg Intravenous Q8H Mayte Sanchez PA-C 2,000 mg (09/06/24 0539)    Cholecalciferol  4,000 Units Oral Daily Mayte Sanchez PA-C      docusate sodium  100 mg Oral BID Tino Chambers MD      melatonin  3 mg Oral HS Tino Chambers MD      metFORMIN  1,000 mg Oral BID With Meals Mayte Sanchez PA-C      methocarbamol  750 mg Oral Q6H PRN Tino Chambers MD      metoprolol tartrate  25 mg Oral Q12H SRIDHAR Mayte Sanchez PA-C      multivitamin stress formula  1 tablet Oral Daily Mayte Sanchez PA-C      ondansetron  4 mg Oral Q6H PRN Ashley Depadua, MD      oxyCODONE  5 mg Oral Q4H PRN Ashley Depadua, MD      polyethylene glycol  17 g Oral Daily Mayte Sanchez PA-C      senna  2 tablet Oral HS Ashley Depadua, MD         Subjective/ HPI: Patient seen and examined. Patients overnight issues or events were reviewed with nursing staff. New or overnight issues include the following:     Pt seen in his room. He is looking forward to CT on Sunday. PMR reviewed pt's blood work with ID. No indication to change his abx. He denies any current complaints.    ROS:   A 10 point ROS was performed; negative except as noted above.        *Labs /Radiology studies Reviewed  *Medications  reviewed and reconciled as needed  *Please refer to order section for additional ordered labs studies      Physical Examination:  Vitals:   Vitals:    09/05/24 1309 09/05/24 2042 09/06/24 0540 09/06/24 0910   BP: 102/56 122/65 112/69 (!) 88/54   BP Location: Left arm Right arm Left arm Left arm   Pulse: 88 84 78 88   Resp: 18 17 17    Temp: 97.7 °F (36.5 °C) (!) 97.4 °F (36.3 °C) 97.6 °F (36.4 °C)    TempSrc: Oral Oral Oral    SpO2: 97% 100% 98%    Weight:       Height:           General Appearance: NAD;  pleasant  HEENT: PERRLA, conjuctiva normal; mucous membranes moist; face symmetrical  Neck:  C-collar intact  Lungs: clear bilaterally, normal respiratory effort, no retractions, expiratory effort normal, on room air  CV: regular rate and rhythm, + murmur. No rubs or gallops noted   ABD: soft non tender, +BS x4  EXT: DP pulses intact, no lymphadenopathy, no edema  Skin: normal turgor, normal texture, no rash  Psych: affect normal, mood normal  Neuro: AAOx3       The above physical exam was reviewed and updated as determined by my evaluation of the patient on 9/6/2024.    Invasive Devices       Central Venous Catheter Line  Duration             CVC Central Lines 08/29/24 Right Other (Comment) 8 days                       VTE Pharmacologic Prophylaxis: Eliquis  Code Status: Level 1 - Full Code  Current Length of Stay: 3 day(s)    Total floor / unit time spent today  35 minutes   Coordination of patient's care was performed in conjunction with consulting services. Time invested included review of patient's labs, vitals, and management of their comorbidities with continued monitoring, examination of patient as well as answering patient questions, documenting her findings and creating progress note in electronic medical record,  ordering appropriate diagnostic testing.       ** Please Note:  voice to text software may have been used in the creation of this document. Although proof errors in transcription or interpretation are a potential of such software**

## 2024-09-06 NOTE — TELEPHONE ENCOUNTER
Called spoke with patient advised per . that he has reviewed his most recent labs and his renal function seems to have improved. also advised per  that he will him to do BMP labs before upcoming visit with him on 09/23/24.  patient verbalized understanding and is okay with it.

## 2024-09-06 NOTE — CASE MANAGEMENT
Referral made to [facility] via [facility] for home iv abx, scripts, h&p and notes from dr plaza uploaded.     Referral made to [facility] via [facility] for home nursing services for iv abx.      Awaiting determination and coverage from both referrals.     [facility] accepted referral stating pt is current with them. Cm confirmed pt would be home for his 2pm dose on Monday.

## 2024-09-06 NOTE — PROGRESS NOTES
"   09/06/24 0700   Pain Assessment   Pain Assessment Tool 0-10   Pain Score 4   Pain Location/Orientation Location: Neck;Orientation: Bilateral;Location: Shoulder   Restrictions/Precautions   Precautions Fall Risk;Pain;Spinal precautions  (5lb weight lifting restriction bilateral UEs)   Weight Bearing Restrictions Yes   RUE Weight Bearing Per Order Other  (5 lb WBing limit)   LUE Weight Bearing Per Order Other  (5 lb WBing limit)   RLE Weight Bearing Per Order WBAT  (surgical shoe)   Braces or Orthoses C/S Collar   Lifestyle   Autonomy \"I did more for myself yesterday. She's very helpful. We'll will be OK at home\"   Eating   Type of Assistance Needed Independent   Eating CARE Score 6   Oral Hygiene   Type of Assistance Needed Independent   Oral Hygiene CARE Score 6   Shower/Bathe Self   Type of Assistance Needed Set-up / clean-up   Comment BERGER for full shower routine in stance, sirrea collar donned   Shower/Bathe Self CARE Score 5   Bathing   Assessed Bath Style Shower   Upper Body Dressing   Type of Assistance Needed Independent   Comment IND don/doff shirt and C collar, benefits from use of mirror for c collar don/doff and requires extra time   Upper Body Dressing CARE Score 6   Lower Body Dressing   Type of Assistance Needed Independent   Comment extra time, cross leg tech   Lower Body Dressing CARE Score 6   Putting On/Taking Off Footwear   Type of Assistance Needed Independent   Physical Assistance Level No physical assistance   Comment extra time, cross leg tech   Putting On/Taking Off Footwear CARE Score 6   Sit to Stand   Type of Assistance Needed Independent   Sit to Stand CARE Score 6   Toileting Hygiene   Type of Assistance Needed Independent   Toileting Hygiene CARE Score 6   Toilet Transfer   Type of Assistance Needed Independent   Toilet Transfer CARE Score 6   Transfers   Additional Comments mod I fxnl mobility in room and short distance in hallway to shower room no AD   Cognition   Overall Cognitive " Status WFL   Arousal/Participation Alert   Attention Within functional limits   Orientation Level Oriented X4   Memory Within functional limits   Following Commands Follows multistep commands without difficulty   Activity Tolerance   Activity Tolerance Patient tolerated treatment well   Other Comments   Assessment (S)  updated to full IRP this date no AD (surgical shoe for mobility and collar AAT.) updated yellow sheet in room to reflect such. updated pt, wife, and nursing.   Assessment   Treatment Assessment OT session focusing on FT w/ wife. See details of fxnl performance above and details of FT below. Pt provided w/ IRP this date at mod I level no AD, pt in agreement, signed updated yellow sheet.   OT Family training done with: Family training completed wife pts wifeSri. Updated pt and wife on OT role in acute rehab and pts current fxnl status, and expected status for Dc on Monday. Edu to wife and pt regarding current precautions including c spinal, C collar AAT, WB restrictions, surgical shoe wear. Edu regarding c collar management including don/doff collar, correct fit, pad donning/doffing, and cleaning yesenia collar management. OT rec for shower that family be present (doesn't need CS) while showers in stance (as this is his preference). If pt wants to clean his feet while maintaining CS prec then he will need to either sit and cross leg or purchase a LHS. UB dressing pt is able to very briefly remove his collar, don/doff shirt and collar be placed back on immediately. The only time the pt is to be w/o  collar is when he is changing his shirt, switching his collar pads or switching to the yesenia. Pt should be wearing a surgical shoe on RLE for wound care mgmt. No AD is needed for home and community distances. OT will address IADL mgmt over weekend but anticipate pt will be IND by [place]. No futher OT services recc at [place]. Pt and wife in agreement. Wife reports she does not need additional FT and OT in  agreement.   Prognosis Good   Problem List Decreased strength;Decreased range of motion;Decreased endurance;Impaired balance;Decreased mobility;Pain;Orthopedic restrictions   Plan   Treatment/Interventions ADL retraining;Functional transfer training;LE strengthening/ROM;Therapeutic exercise;Endurance training;Patient/family training;Equipment eval/education;Compensatory technique education;Continued evaluation   Progress Progressing toward goals   OT Therapy Minutes   OT Time In 0700   OT Time Out 0830   OT Total Time (minutes) 90   OT Mode of treatment - Individual (minutes) 90   OT Mode of treatment - Concurrent (minutes) 0   OT Mode of treatment - Group (minutes) 0   OT Mode of treatment - Co-treat (minutes) 0   OT Mode of Treatment - Total time(minutes) 90 minutes   OT Cumulative Minutes 270   Therapy Time missed   Time missed? No

## 2024-09-07 PROCEDURE — 99232 SBSQ HOSP IP/OBS MODERATE 35: CPT | Performed by: PHYSICIAN ASSISTANT

## 2024-09-07 PROCEDURE — 97535 SELF CARE MNGMENT TRAINING: CPT

## 2024-09-07 PROCEDURE — 97110 THERAPEUTIC EXERCISES: CPT

## 2024-09-07 PROCEDURE — 97530 THERAPEUTIC ACTIVITIES: CPT

## 2024-09-07 PROCEDURE — 97112 NEUROMUSCULAR REEDUCATION: CPT

## 2024-09-07 RX ADMIN — CEFAZOLIN SODIUM 2000 MG: 2 SOLUTION INTRAVENOUS at 22:12

## 2024-09-07 RX ADMIN — APIXABAN 5 MG: 5 TABLET, FILM COATED ORAL at 17:54

## 2024-09-07 RX ADMIN — AMIODARONE HYDROCHLORIDE 200 MG: 200 TABLET ORAL at 08:48

## 2024-09-07 RX ADMIN — POLYETHYLENE GLYCOL 3350 17 G: 17 POWDER, FOR SOLUTION ORAL at 08:47

## 2024-09-07 RX ADMIN — Medication 3 MG: at 22:12

## 2024-09-07 RX ADMIN — DOCUSATE SODIUM 100 MG: 100 CAPSULE, LIQUID FILLED ORAL at 08:48

## 2024-09-07 RX ADMIN — DOCUSATE SODIUM 100 MG: 100 CAPSULE, LIQUID FILLED ORAL at 17:54

## 2024-09-07 RX ADMIN — CEFAZOLIN SODIUM 2000 MG: 2 SOLUTION INTRAVENOUS at 14:13

## 2024-09-07 RX ADMIN — METFORMIN HYDROCHLORIDE 1000 MG: 500 TABLET ORAL at 07:20

## 2024-09-07 RX ADMIN — Medication 4000 UNITS: at 08:47

## 2024-09-07 RX ADMIN — CEFAZOLIN SODIUM 2000 MG: 2 SOLUTION INTRAVENOUS at 05:51

## 2024-09-07 RX ADMIN — SENNOSIDES 17.2 MG: 8.6 TABLET, FILM COATED ORAL at 22:12

## 2024-09-07 RX ADMIN — METOPROLOL TARTRATE 25 MG: 25 TABLET, FILM COATED ORAL at 08:48

## 2024-09-07 RX ADMIN — METFORMIN HYDROCHLORIDE 1000 MG: 500 TABLET ORAL at 17:54

## 2024-09-07 RX ADMIN — ATORVASTATIN CALCIUM 80 MG: 80 TABLET, FILM COATED ORAL at 17:54

## 2024-09-07 RX ADMIN — APIXABAN 5 MG: 5 TABLET, FILM COATED ORAL at 08:48

## 2024-09-07 RX ADMIN — METHOCARBAMOL 750 MG: 750 TABLET ORAL at 22:18

## 2024-09-07 RX ADMIN — B-COMPLEX W/ C & FOLIC ACID TAB 1 TABLET: TAB at 08:47

## 2024-09-07 NOTE — PLAN OF CARE
Problem: INFECTION - ADULT  Goal: Absence or prevention of progression during hospitalization  Description: INTERVENTIONS:  - Assess and monitor for signs and symptoms of infection  - Monitor lab/diagnostic results  - Monitor all insertion sites, i.e. indwelling lines, tubes, and drains  - Monitor endotracheal if appropriate and nasal secretions for changes in amount and color  - [place] appropriate cooling/warming therapies per order  - Administer medications as ordered  - Instruct and encourage patient and family to use good hand hygiene technique  - Identify and instruct in appropriate isolation precautions for identified infection/condition  Outcome: Progressing

## 2024-09-07 NOTE — PLAN OF CARE
Problem: PAIN - ADULT  Goal: Verbalizes/displays adequate comfort level or baseline comfort level  Description: Interventions:  - Encourage patient to monitor pain and request assistance  - Assess pain using appropriate pain scale  - Administer analgesics based on type and severity of pain and evaluate response  - Implement non-pharmacological measures as appropriate and evaluate response  - Consider cultural and social influences on pain and pain management  - Notify physician/advanced practitioner if interventions unsuccessful or patient reports new pain  Outcome: Progressing     Problem: INFECTION - ADULT  Goal: Absence or prevention of progression during hospitalization  Description: INTERVENTIONS:  - Assess and monitor for signs and symptoms of infection  - Monitor lab/diagnostic results  - Monitor all insertion sites, i.e. indwelling lines, tubes, and drains  - Monitor endotracheal if appropriate and nasal secretions for changes in amount and color  - [place] appropriate cooling/warming therapies per order  - Administer medications as ordered  - Instruct and encourage patient and family to use good hand hygiene technique  - Identify and instruct in appropriate isolation precautions for identified infection/condition  Outcome: Progressing     Problem: SAFETY ADULT  Goal: Patient will remain free of falls  Description: INTERVENTIONS:  - Educate patient/family on patient safety including physical limitations  - Instruct patient to call for assistance with activity   - Consult OT/PT to assist with strengthening/mobility   - Keep Call bell within reach  - Keep bed low and locked with side rails adjusted as appropriate  - Keep care items and personal belongings within reach  - Initiate and maintain comfort rounds  - Make Fall Risk Sign visible to staff  - Offer Toileting every 2 Hours, in advance of need  - Initiate/Maintain bed alarm  - Obtain necessary fall risk management equipment: non skid scoks  - Apply  yellow socks and bracelet for high fall risk patients  - Consider moving patient to room near nurses station  Outcome: Progressing  Goal: Maintain or return to baseline ADL function  Description: INTERVENTIONS:  -  Assess patient's ability to carry out ADLs; assess patient's baseline for ADL function and identify physical deficits which impact ability to perform ADLs (bathing, care of mouth/teeth, toileting, grooming, dressing, etc.)  - Assess/evaluate cause of self-care deficits   - Assess range of motion  - Assess patient's mobility; develop plan if impaired  - Assess patient's need for assistive devices and provide as appropriate  - Encourage maximum independence but intervene and supervise when necessary  - Involve family in performance of ADLs  - Assess for home care needs following discharge   - Consider OT consult to assist with ADL evaluation and planning for discharge  - Provide patient education as appropriate  Outcome: Progressing  Goal: Maintains/Returns to pre admission functional level  Description: INTERVENTIONS:  - Perform AM-PAC 6 Click Basic Mobility/ Daily Activity assessment daily.  - Set and communicate daily mobility goal to care team and patient/family/caregiver.   - Collaborate with rehabilitation services on mobility goals if consulted  - Perform Range of Motion 3 times a day.  - Reposition patient every 2 hours.  - Dangle patient 3 times a day  - Stand patient 3 times a day  - Ambulate patient 3 times a day  - Out of bed to chair 3 times a day   - Out of bed for meals 3 times a day  - Out of bed for toileting  - Record patient progress and toleration of activity level   Outcome: Progressing     Problem: DISCHARGE PLANNING  Goal: Discharge to home or other facility with appropriate resources  Description: INTERVENTIONS:  - Identify barriers to discharge w/patient and caregiver  - Arrange for needed discharge resources and transportation as appropriate  - Identify discharge learning needs  (meds, wound care, etc.)  - Arrange for interpretive services to assist at discharge as needed  - Refer to Case Management Department for coordinating discharge planning if the patient needs post-hospital services based on physician/advanced practitioner order or complex needs related to functional status, cognitive ability, or social support system  Outcome: Progressing     Problem: Prexisting or High Potential for Compromised Skin Integrity  Goal: Skin integrity is maintained or improved  Description: INTERVENTIONS:  - Identify patients at risk for skin breakdown  - Assess and monitor skin integrity  - Assess and monitor nutrition and hydration status  - Monitor labs   - Assess for incontinence   - Turn and reposition patient  - Assist with mobility/ambulation  - Relieve pressure over bony prominences  - Avoid friction and shearing  - Provide appropriate hygiene as needed including keeping skin clean and dry  - Evaluate need for skin moisturizer/barrier cream  - Collaborate with interdisciplinary team   - Patient/family teaching  - Consider wound care consult   Outcome: Progressing     Problem: Nutrition/Hydration-ADULT  Goal: Nutrient/Hydration intake appropriate for improving, restoring or maintaining nutritional needs  Description: Monitor and assess patient's nutrition/hydration status for malnutrition. Collaborate with interdisciplinary team and initiate plan and interventions as ordered.  Monitor patient's weight and dietary intake as ordered or per policy. Utilize nutrition screening tool and intervene as necessary. Determine patient's food preferences and provide high-protein, high-caloric foods as appropriate.     INTERVENTIONS:  - Monitor oral intake, urinary output, labs, and treatment plans  - Assess nutrition and hydration status and recommend course of action  - Evaluate amount of meals eaten  - Assist patient with eating if necessary   - Allow adequate time for meals  - Recommend/ encourage  appropriate diets, oral nutritional supplements, and vitamin/mineral supplements  - Order, calculate, and assess calorie counts as needed  - Recommend, monitor, and adjust tube feedings and TPN/PPN based on assessed needs  - Assess need for intravenous fluids  - Provide specific nutrition/hydration education as appropriate  - Include patient/family/caregiver in decisions related to nutrition  Outcome: Progressing

## 2024-09-07 NOTE — ASSESSMENT & PLAN NOTE
Lab Results   Component Value Date    HGBA1C 6.8 (H) 07/05/2024       Recent Labs     09/05/24  1055 09/05/24  1538 09/05/24  2115 09/06/24  0645   POCGLU 108 104 108 132         Blood Sugar Average: Last 72 hrs:  (P) [id number]    Follows with wound care as outpatient  Was seen by podiatry during recent hospitalization  Continue local wound care  Follow up with wound and podiatry

## 2024-09-07 NOTE — PROGRESS NOTES
09/07/24 1235   Pain Assessment   Pain Score No Pain  (c/o neck fatigue not pain)   Restrictions/Precautions   Precautions Fall Risk;Spinal precautions  (5# wt restriction for B UE)   RLE Weight Bearing Per Order WBAT   Braces or Orthoses C/S Collar  (surgical shoe R foot)   General   Change In Medical/Functional Status Progressed to hallway privilege today. OT in agreement and nurse made aware.   Cognition   Arousal/Participation Alert   Attention Within functional limits   Memory Within functional limits   Following Commands Follows multistep commands without difficulty   Subjective   Subjective Pt. c/o neck fatigue after nu step but otherwise no other complaints reported   Sit to Stand   Type of Assistance Needed Independent   Comment no AD   Sit to Stand CARE Score 6   Bed-Chair Transfer   Type of Assistance Needed Independent   Chair/Bed-to-Chair Transfer CARE Score 6   Car Transfer   Type of Assistance Needed Supervision;Verbal cues   Comment no AD , verbal cues/ reminder to watch head and neck,   Car Transfer CARE Score 4   Walk 10 Feet   Type of Assistance Needed Independent   Comment no AD   Walk 10 Feet CARE Score 6   Walk 50 Feet with Two Turns   Type of Assistance Needed Independent   Comment no AD   Walk 50 Feet with Two Turns CARE Score 6   Walk 150 Feet   Type of Assistance Needed Independent   Comment no AD   Walk 150 Feet CARE Score 6   Ambulation   Primary Mode of Locomotion Prior to Admission Walk   Distance Walked (feet) 350 ft  (X 2 and shorter distances for task)   Assist Device   (no AD)   Gait Pattern Forward Flexion;Lateral deviation   Limitations Noted In Posture   Walk Assist Level Independent   Does the patient walk? 2. Yes   Wheel 50 Feet with Two Turns   Reason if not Attempted Activity not applicable   Wheel 50 Feet with Two Turns CARE Score 9   Wheel 150 Feet   Reason if not Attempted Activity not applicable   Wheel 150 Feet CARE Score 9   Wheelchair mobility   Does the patient use  a wheelchair? 0. No   Curb or Single Stair   Style negotiated Single stair   Type of Assistance Needed Set-up / clean-up   Comment using R HR down, L HR up, step to pattern   1 Step (Curb) CARE Score 5   4 Steps   Type of Assistance Needed Set-up / clean-up   Comment using R HR down, L HR up, step to pattern   4 Steps CARE Score 5   12 Steps   Type of Assistance Needed Set-up / clean-up   Comment using R HR down, L HR up, step to pattern   12 Steps CARE Score 5   Stairs   Type Stairs   # of Steps 21   Weight Bearing Precautions Fall Risk   Assist Devices Single Rail   Picking Up Object   Type of Assistance Needed Independent   Comment Can  without reacher use but recommended to use grabber to maintain spinal precautions   Picking Up Object CARE Score 6   Therapeutic Interventions   Strengthening seated LAQ, hip flex with 3# wts, add squeeze with ball, standing hip abduction and hip flexion by the counter   Flexibility B hamstring and gastroc stretching   Balance weaving around cones, kicking cones, then picking them up with grabber , ambulatory ball kicking X 350 feet .   Equipment Use   NuStep L2 X 15 mins B LE only   Assessment   Treatment Assessment Pt. engaged in 90 mins session and was able to tolerate above activities. Pt. needs rests breaks in between activities as he gets fatigued on his neck especially after he did the nu step. Pt. trying to inc speed to 90 spm and stated that he might over did it. No toher complaints reported. Pt. progressed to hallway privilege with nurse made aware. Cont with POC and re assess all care scores tomorrow and any outcome measures in preparation for d/c.   Problem List Orthopedic restrictions;Impaired balance;Decreased coordination;Pain;Decreased strength;Decreased endurance   Barriers to Discharge None   PT Barriers   Functional Limitation Car transfers;Stair negotiation;Standing;Transfers;Walking;Ramp negotiation   Plan   Treatment/Interventions Functional transfer  training;LE strengthening/ROM;Elevations;Therapeutic exercise;Endurance training;Patient/family training;Equipment eval/education;Bed mobility;Gait training   Discharge Recommendation   Rehab Resource Intensity Level, PT   (HEP)   Equipment Recommended   (no AD)   PT Therapy Minutes   PT Time In 1235   PT Time Out 1405   PT Total Time (minutes) 90   PT Mode of treatment - Individual (minutes) 90   PT Mode of treatment - Concurrent (minutes) 0   PT Mode of treatment - Group (minutes) 0   PT Mode of treatment - Co-treat (minutes) 0   PT Mode of Treatment - Total time(minutes) 90 minutes   PT Cumulative Minutes 360   Therapy Time missed   Time missed? No

## 2024-09-07 NOTE — ASSESSMENT & PLAN NOTE
FINA during hospitalization, creat peaked at 3.38. now down to 1.09  Was seen by nephrology  Had avilez which was subsequently removed  Avoid nephrotoxins  Monitor labs - repeat Monday.  Follow up with nephrology after discharge

## 2024-09-07 NOTE — ASSESSMENT & PLAN NOTE
Newly diagnosed in hospital on 8/21/24  Seen by cardiology  Initially on amiodarone infusion- now on PO amiodarone  Also started on lopressor - held 9/6/24 due to soft BPs but has otherwise been receiving the medication.  On eliquis for AC

## 2024-09-07 NOTE — ASSESSMENT & PLAN NOTE
Lab Results   Component Value Date    HGBA1C 6.8 (H) 07/05/2024       Recent Labs     09/05/24  1055 09/05/24  1538 09/05/24  2115 09/06/24  0645   POCGLU 108 104 108 132         Blood Sugar Average: Last 72 hrs:  (P) [id number]    Home regimen: metformin and jardiance  Here: continue metformin, jardiance not on formulary here in hospital.  Blood sugar checks all acceptable. Will stop accuchecks.

## 2024-09-07 NOTE — PROGRESS NOTES
[facility]  Progress Note  Name: Augustus Coffman I  MRN: [id number]  Unit/Bed#: -01 I Date of Admission: 9/3/2024   Date of Service: 9/7/2024 I Hospital Day: 4    Assessment & Plan   * MSSA bacteremia  Assessment & Plan  Previously hospitalized for MSSA bacteremia on 7/28, thought to be from heel ulcer and completed a course of antibiotics and was discharged home  Per chart, he was complaining of neck pain during that admission  He was sent to ortho outpatient and had an MRI of his neck that reported osteo and discitis so he was sent to the ED for admission  He was admitted on 8/20 and was seen by ID  LUCRECIA showed no endocarditis  MRI showed discitis/osteo at C5-C6  ID recommending 6 weeks of IV ancef    Needs repeat limited LUCRECIA when c collar is discontinued  Needs CBC and CMP weekly while on antibiotics  Has PICC which can be discontinued when ancef complete  ID following - plan for ancef for 6 weeks thru 10/3 then possibly transition to PO antibiotics until SED rate and CRP improve    Diabetic ulcer of right foot (HCC)  Assessment & Plan  Lab Results   Component Value Date    HGBA1C 6.8 (H) 07/05/2024       Recent Labs     09/05/24  1055 09/05/24  1538 09/05/24  2115 09/06/24  0645   POCGLU 108 104 108 132         Blood Sugar Average: Last 72 hrs:  (P) 122.9359700399819507    Follows with wound care as outpatient  Was seen by podiatry during recent hospitalization  Continue local wound care  Follow up with wound and podiatry    Acute pericarditis  Assessment & Plan  Had LUCRECIA 8/27 and moderate pericardial effusion seen  Per cardiology- no plans for drainage  Completed colchicine and prednisone on 8/27  Needs follow up LUCRECIA in 1 month    New onset a-fib (HCC)  Assessment & Plan  Newly diagnosed in hospital on 8/21/24  Seen by cardiology  Initially on amiodarone infusion- now on PO amiodarone  Also started on lopressor - held 9/6/24 due to soft BPs but has otherwise been  receiving the medication.  On eliquis for AC    Transaminitis  Assessment & Plan  Seen on labs in hospital  Improving.  Continue to monitor.    Acute osteomyelitis of cervical spine (HCC)  Assessment & Plan  Seen by neurosurg  Ancef for 6 weeks per ID  Continue use of C-collar  Monitor neuro checks    FINA (acute kidney injury) on CKD stage 2(HCC)  Assessment & Plan  FINA during hospitalization, creat peaked at 3.38. now down to 1.09  Was seen by nephrology  Had avilez which was subsequently removed  Avoid nephrotoxins  Monitor labs - repeat Monday.  Follow up with nephrology after discharge    Type 2 diabetes mellitus (HCC)  Assessment & Plan  Lab Results   Component Value Date    HGBA1C 6.8 (H) 07/05/2024       Recent Labs     09/05/24  1055 09/05/24  1538 09/05/24 2115 09/06/24  0645   POCGLU 108 104 108 132         Blood Sugar Average: Last 72 hrs:  (P) 122.6266536176560406    Home regimen: metformin and jardiance  Here: continue metformin, jardiance not on formulary here in hospital.  Blood sugar checks all acceptable. Will stop accuchecks.             The above assessment and plan was reviewed and updated as determined by my evaluation of the patient on 9/7/2024.    Labs:   Results from last 7 days   Lab Units 09/06/24  0948 09/06/24  0547   WBC Thousand/uL 10.62* 10.04   HEMOGLOBIN g/dL 9.2* 9.0*   HEMATOCRIT % 31.0* 30.0*   PLATELETS Thousands/uL 268 262     Results from last 7 days   Lab Units 09/05/24  0500 09/01/24  0636   SODIUM mmol/L 139 138   POTASSIUM mmol/L 4.6 4.5   CHLORIDE mmol/L 102 99   CO2 mmol/L 25 31   BUN mg/dL 31* 48*   CREATININE mg/dL 1.09 1.09   CALCIUM mg/dL 9.2 9.5             Results from last 7 days   Lab Units 09/06/24  0645 09/05/24  2115 09/05/24  1538   POC GLUCOSE mg/dl 132 108 104       Imaging  No orders to display       Review of Scheduled Meds:  Current Facility-Administered Medications   Medication Dose Route Frequency Provider Last Rate    acetaminophen  975 mg Oral Q8H PRN  Tino Chambers MD      amiodarone  200 mg Oral Daily Mayte Sanchez PA-C      apixaban  5 mg Oral BID Mayte Sanchez PA-C      atorvastatin  80 mg Oral Daily With Dinner Mayte Sanchez PA-C      bisacodyl  10 mg Rectal Daily PRN Ashley Depadua, MD      ceFAZolin  2,000 mg Intravenous Q8H Mayte Sanchez PA-C 2,000 mg (09/07/24 1413)    Cholecalciferol  4,000 Units Oral Daily Mayte Sanchez PA-C      docusate sodium  100 mg Oral BID Tino Chambers MD      melatonin  3 mg Oral HS Tino Chambers MD      metFORMIN  1,000 mg Oral BID With Meals Mayte Sanchez PA-C      methocarbamol  750 mg Oral Q6H PRN Tino Chambers MD      metoprolol tartrate  25 mg Oral Q12H [facility] Mayte Sanchez PA-C      multivitamin stress formula  1 tablet Oral Daily Mayte Sanchez PA-C      ondansetron  4 mg Oral Q6H PRN Ashley Depadua, MD      oxyCODONE  5 mg Oral Q4H PRN Ashley Depadua, MD      polyethylene glycol  17 g Oral Daily Mayte Sanchez PA-C      senna  2 tablet Oral HS Ashley Depadua, MD         Subjective/ HPI: Patient seen and examined. Patients overnight issues or events were reviewed with nursing staff. New or overnight issues include the following:     Pt seen in his room with family present. He is looking forward to ID on Monday. He denies any current complaints.    ROS:   A 10 point ROS was performed; negative except as noted above.        *Labs /Radiology studies Reviewed  *Medications  reviewed and reconciled as needed  *Please refer to order section for additional ordered labs studies      Physical Examination:  Vitals:   Vitals:    09/06/24 2135 09/07/24 0552 09/07/24 0848 09/07/24 1347   BP: 108/65 122/75 128/62 115/65   BP Location: Left arm Left arm  Left arm   Pulse: 91 74 74 84   Resp: 18 18  16   Temp: 98.3 °F (36.8 °C) 97.8 °F (36.6 °C)  98.3 °F (36.8 °C)   TempSrc: Oral Oral  Oral   SpO2: 98% 98%  98%   Weight:       Height:           General Appearance: NAD; pleasant  HEENT: ULYSSES  conjuctiva normal; mucous membranes moist; face symmetrical  Neck:  Collar intact  Lungs: clear bilaterally, normal respiratory effort, no retractions, expiratory effort normal, on room air  CV: regular rate and rhythm, no murmurs rubs or gallops noted   ABD: soft non tender, +BS x4  EXT: DP pulses intact, no lymphadenopathy, no edema  Skin: normal turgor, normal texture, no rash  Psych: affect normal, mood normal  Neuro: AAOx3       The above physical exam was reviewed and updated as determined by my evaluation of the patient on 9/7/2024.    Invasive Devices       Central Venous Catheter Line  Duration             CVC Central Lines 08/29/24 Right Other (Comment) 9 days                       VTE Pharmacologic Prophylaxis: Eliquis  Code Status: Level 1 - Full Code  Current Length of Stay: 4 day(s)    Total floor / unit time spent today  35 minutes   Coordination of patient's care was performed in conjunction with consulting services. Time invested included review of patient's labs, vitals, and management of their comorbidities with continued monitoring, examination of patient as well as answering patient questions, documenting her findings and creating progress note in electronic medical record,  ordering appropriate diagnostic testing.       ** Please Note:  voice to text software may have been used in the creation of this document. Although proof errors in transcription or interpretation are a potential of such software**

## 2024-09-07 NOTE — PROGRESS NOTES
"OT daily treatment note       09/07/24 1000   Pain Assessment   Pain Assessment Tool 0-10   Pain Score No Pain   Restrictions/Precautions   Precautions Fall Risk;Pain;Spinal precautions  (5lb weight lifting restriction bilateral UEs)   Braces or Orthoses C/S Collar   Lifestyle   Autonomy \"I can't believe I'm going home!\"   Eating   Type of Assistance Needed Independent   Physical Assistance Level No physical assistance   Eating CARE Score 6   Oral Hygiene   Type of Assistance Needed Independent   Physical Assistance Level No physical assistance   Oral Hygiene CARE Score 6   Shower/Bathe Self   Type of Assistance Needed Independent   Physical Assistance Level No physical assistance   Comment full shower in stance; yesenia collar donned IND. tegaderm placed on PICC line   Shower/Bathe Self CARE Score 6   Upper Body Dressing   Type of Assistance Needed Independent   Physical Assistance Level No physical assistance   Comment IND don/doff shirt, C/S collar mgmt   Upper Body Dressing CARE Score 6   Lower Body Dressing   Type of Assistance Needed Independent   Physical Assistance Level No physical assistance   Comment cross leg technique   Lower Body Dressing CARE Score 6   Putting On/Taking Off Footwear   Type of Assistance Needed Independent   Physical Assistance Level No physical assistance   Comment cross leg technique   Putting On/Taking Off Footwear CARE Score 6   Picking Up Object   Type of Assistance Needed Independent   Physical Assistance Level No physical assistance   Picking Up Object CARE Score 6   Sit to Stand   Type of Assistance Needed Independent   Physical Assistance Level No physical assistance   Comment no AD   Sit to Stand CARE Score 6   Bed-Chair Transfer   Type of Assistance Needed Independent   Physical Assistance Level No physical assistance   Comment no AD   Chair/Bed-to-Chair Transfer CARE Score 6   Toileting Hygiene   Type of Assistance Needed Independent   Physical Assistance Level No physical " assistance   Toileting Hygiene CARE Score 6   Toilet Transfer   Type of Assistance Needed Independent   Physical Assistance Level No physical assistance   Comment no AD   Toilet Transfer CARE Score 6   Commmunity Re-entry   Community Re-entry Level Other (Comment)  (no AD)   Community Re-entry Level of Assistance Independent   Community Re-entry Pt participated in functional mobility task with use of no device to simulate home and community distances required for ADL/IADL completion. Task focused on increasing functional activity tolerance, endurance, strength and dynamic standing balance to improve independence with ADL completion. Pt requesting to be able to ambulate in hallway IND - OT is ok with it but deferred to PT to make it official.   Exercise Tools   Exercise Tools Yes   UE Ergometer Pt engaged in UE ergometer for 10 minutes in prograde with Minimal resistance focusing on increasing pts UB strength, endurance and functional activity tolerance to increase independence with ADL tasks. Short rest break required in between sets for fatigue mgmt. Education provided on energy conservation and breathing techniques with Positive carryover of provided education.   Cognition   Overall Cognitive Status WFL   Arousal/Participation Alert   Attention Within functional limits   Orientation Level Oriented X4   Memory Within functional limits   Following Commands Follows multistep commands without difficulty   Additional Activities   Additional Activities Other (Comment)   Additional Activities Comments pt with new pressure wound - OT edu on offloading and repositioning every hour. pt is high level so OT also recc ambulating for at least 5-10 mins every hour to allow for true offloading. pt in agreement. pt does sleep in a recliner at home - OT to provide information on how to purchase ROHO cushion at home to use on recliner.   Activity Tolerance   Activity Tolerance Patient tolerated treatment well   Assessment    Treatment Assessment Pt participated in skilled OT session with focus on ADL retraining, UE strengthening/ROM, endurance training, compensatory technique education, and continued education. See flowsheet for details of session and current functional status. Pt is limited by decreased endurance, increased fall risk, and pain and requires skilled OT services to increase independence and safety with ADL completion in prep for DC home. Plan to continue ADL retraining, functional transfer training, UE strengthening/ROM, endurance training, Pt/caregiver education, compensatory technique education, continued education, energy conservation, and activity engagement  to address barriers mentioned above.   Barriers to Discharge None   OT Therapy Minutes   OT Time In 1000   OT Time Out 1130   OT Total Time (minutes) 90   OT Mode of treatment - Individual (minutes) 90   OT Mode of treatment - Concurrent (minutes) 0   OT Mode of treatment - Group (minutes) 0   OT Mode of treatment - Co-treat (minutes) 0   OT Mode of Treatment - Total time(minutes) 90 minutes   OT Cumulative Minutes 360   Therapy Time missed   Time missed? No

## 2024-09-08 PROBLEM — D64.89 OTHER SPECIFIED ANEMIAS: Status: ACTIVE | Noted: 2024-09-08

## 2024-09-08 PROCEDURE — 97535 SELF CARE MNGMENT TRAINING: CPT

## 2024-09-08 PROCEDURE — 97110 THERAPEUTIC EXERCISES: CPT

## 2024-09-08 PROCEDURE — 97530 THERAPEUTIC ACTIVITIES: CPT

## 2024-09-08 PROCEDURE — 99232 SBSQ HOSP IP/OBS MODERATE 35: CPT | Performed by: PHYSICIAN ASSISTANT

## 2024-09-08 PROCEDURE — 97116 GAIT TRAINING THERAPY: CPT

## 2024-09-08 RX ORDER — METOPROLOL TARTRATE 25 MG/1
25 TABLET, FILM COATED ORAL EVERY 12 HOURS SCHEDULED
Qty: 60 TABLET | Refills: 0 | Status: SHIPPED | OUTPATIENT
Start: 2024-09-08

## 2024-09-08 RX ORDER — AMIODARONE HYDROCHLORIDE 200 MG/1
200 TABLET ORAL DAILY
Qty: 30 TABLET | Refills: 0 | Status: SHIPPED | OUTPATIENT
Start: 2024-09-09

## 2024-09-08 RX ORDER — ACETAMINOPHEN 325 MG/1
975 TABLET ORAL EVERY 8 HOURS PRN
Start: 2024-09-08

## 2024-09-08 RX ADMIN — APIXABAN 5 MG: 5 TABLET, FILM COATED ORAL at 08:19

## 2024-09-08 RX ADMIN — METFORMIN HYDROCHLORIDE 1000 MG: 500 TABLET ORAL at 16:59

## 2024-09-08 RX ADMIN — APIXABAN 5 MG: 5 TABLET, FILM COATED ORAL at 17:02

## 2024-09-08 RX ADMIN — Medication 3 MG: at 21:01

## 2024-09-08 RX ADMIN — METHOCARBAMOL 750 MG: 750 TABLET ORAL at 09:11

## 2024-09-08 RX ADMIN — B-COMPLEX W/ C & FOLIC ACID TAB 1 TABLET: TAB at 08:18

## 2024-09-08 RX ADMIN — METOPROLOL TARTRATE 25 MG: 25 TABLET, FILM COATED ORAL at 08:19

## 2024-09-08 RX ADMIN — DOCUSATE SODIUM 100 MG: 100 CAPSULE, LIQUID FILLED ORAL at 17:02

## 2024-09-08 RX ADMIN — POLYETHYLENE GLYCOL 3350 17 G: 17 POWDER, FOR SOLUTION ORAL at 08:20

## 2024-09-08 RX ADMIN — METFORMIN HYDROCHLORIDE 1000 MG: 500 TABLET ORAL at 08:19

## 2024-09-08 RX ADMIN — CEFAZOLIN SODIUM 2000 MG: 2 SOLUTION INTRAVENOUS at 14:29

## 2024-09-08 RX ADMIN — DOCUSATE SODIUM 100 MG: 100 CAPSULE, LIQUID FILLED ORAL at 08:19

## 2024-09-08 RX ADMIN — Medication 4000 UNITS: at 08:20

## 2024-09-08 RX ADMIN — SENNOSIDES 17.2 MG: 8.6 TABLET, FILM COATED ORAL at 21:02

## 2024-09-08 RX ADMIN — CEFAZOLIN SODIUM 2000 MG: 2 SOLUTION INTRAVENOUS at 05:05

## 2024-09-08 RX ADMIN — CEFAZOLIN SODIUM 2000 MG: 2 SOLUTION INTRAVENOUS at 21:00

## 2024-09-08 RX ADMIN — AMIODARONE HYDROCHLORIDE 200 MG: 200 TABLET ORAL at 08:19

## 2024-09-08 RX ADMIN — ATORVASTATIN CALCIUM 80 MG: 80 TABLET, FILM COATED ORAL at 16:59

## 2024-09-08 NOTE — PROGRESS NOTES
09/08/24 1000   Pain Assessment   Pain Assessment Tool 0-10   Pain Score No Pain   Restrictions/Precautions   Precautions Spinal precautions  (5# UE WBing restriction)   Braces or Orthoses C/S Collar;Other (Comment)  (R surgical shoe)   Cognition   Overall Cognitive Status WFL   Arousal/Participation Alert   Attention Within functional limits   Orientation Level Oriented X4   Memory Within functional limits   Following Commands Follows multistep commands without difficulty   Subjective   Subjective Pt ready for PT   Sit to Stand   Type of Assistance Needed Independent   Physical Assistance Level No physical assistance   Comment no AD   Sit to Stand CARE Score 6   Bed-Chair Transfer   Type of Assistance Needed Independent   Physical Assistance Level No physical assistance   Comment no AD   Chair/Bed-to-Chair Transfer CARE Score 6   Car Transfer   Type of Assistance Needed Independent   Physical Assistance Level No physical assistance   Comment no AD   Car Transfer CARE Score 6   Walk 10 Feet   Type of Assistance Needed Independent   Physical Assistance Level No physical assistance   Comment no AD   Walk 10 Feet CARE Score 6   Walk 50 Feet with Two Turns   Type of Assistance Needed Independent   Physical Assistance Level No physical assistance   Comment no AD   Walk 50 Feet with Two Turns CARE Score 6   Walk 150 Feet   Type of Assistance Needed Independent   Physical Assistance Level No physical assistance   Comment no AD   Walk 150 Feet CARE Score 6   Walking 10 Feet on Uneven Surfaces   Type of Assistance Needed Independent   Physical Assistance Level No physical assistance   Comment no AD mat   Walking 10 Feet on Uneven Surfaces CARE Score 6   Ambulation   Primary Mode of Locomotion Prior to Admission Walk   Distance Walked (feet) 350 ft  (x3)   Gait Pattern Forward Flexion   Limitations Noted In Posture   Provided Assistance with: Direction   Walk Assist Level Independent   Does the patient walk? 2. Yes   Curb  or Single Stair   Style negotiated Single stair   Type of Assistance Needed Independent   Physical Assistance Level No physical assistance   1 Step (Curb) CARE Score 6   4 Steps   Type of Assistance Needed Independent   Physical Assistance Level No physical assistance   4 Steps CARE Score 6   12 Steps   Type of Assistance Needed Independent   Physical Assistance Level No physical assistance   12 Steps CARE Score 6   Stairs   Type Stairs   # of Steps 21   Weight Bearing Precautions Fall Risk   Assist Devices Single Rail   Picking Up Object   Type of Assistance Needed Independent   Physical Assistance Level No physical assistance   Comment reacher   Picking Up Object CARE Score 6   Toilet Transfer   Type of Assistance Needed Independent   Physical Assistance Level No physical assistance   Comment no AD   Toilet Transfer CARE Score 6   Equipment Use   NuStep L2 x 15 min LE only   Assessment   Treatment Assessment Wilmer participated in 90 min skilled PT session with emphasis on stair negotiation and gait. Pt progressed to independent with all functional mobility (transfers, gait, stair negotiation, item retrieval). Pt endorses he will continue to sleep in recliner chair upon d/c (before hospitalization he slept in recliner chair). No barriers to discharge identified. Educated patient on continued activity upon d/c to maintain function and maximize quality of life.   Barriers to Discharge None   PT Barriers   Functional Limitation Walking   Plan   Treatment/Interventions Gait training   PT Therapy Minutes   PT Time In 1000   PT Time Out 1130   PT Total Time (minutes) 90   PT Mode of treatment - Individual (minutes) 90   PT Mode of treatment - Concurrent (minutes) 0   PT Mode of treatment - Group (minutes) 0   PT Mode of treatment - Co-treat (minutes) 0   PT Mode of Treatment - Total time(minutes) 90 minutes   PT Cumulative Minutes 450   Therapy Time missed   Time missed? No

## 2024-09-08 NOTE — DISCHARGE SUMMARY
Discharge Summary   Augustus Coffman 68 y.o. male MRN: [id number]  Unit/Bed#: [facility] 973-01 Encounter: [id number]  Admission Date: 9/3/2024     Discharge Date: 9/9/24    Discharging Provider: May Altamirano    PCP:  [phone]      HPI: Refer to previous hospitalization for complete record    Summary of [facility] Course: Augustus Coffman is a 69yo male, with DM type 2, hyperlipidemia, diabetic foot wound, and CKD, who was admitted to the [facility] after hospitalization due to MSSA bacteremia and osteomyelitis complicated by pericarditis, FINA and new onset atrial fibrillation. He underwent in intensive inpatient rehabilitation program. He did receive IV antibiotics during his [facility] stay and will continue them at home. Home medications for DM were resumed although Jardiance is not on hospital formulary. His blood sugars were controlled on metformin alone. Pt did progress through the rehabilitation program and pt is cleared for DC from both PMR and medicine standpoint.     Problem List/Comorbidities:    * MSSA bacteremia  Assessment & Plan  Previously hospitalized for MSSA bacteremia on 7/28, thought to be from heel ulcer and completed a course of antibiotics and was discharged home  Per chart, he was complaining of neck pain during that admission  He was sent to ortho outpatient and had an MRI of his neck that reported osteo and discitis so he was sent to the ED for admission  He was admitted on 8/20 and was seen by ID  LUCRECIA showed no endocarditis  MRI showed discitis/osteo at C5-C6  ID recommending 6 weeks of IV ancef    Needs repeat limited LUCRECIA when c collar is discontinued  Needs CBC and CMP weekly while on antibiotics  Has PICC which can be discontinued when ancef complete  ID following - plan for ancef for 6 weeks thru 10/3 then possibly transition to PO antibiotics until SED rate and CRP improve  Outpt follow-up with ID as scheduled.    Other specified anemias  Assessment & Plan  Hgb 9.1  Stable.    Diabetic ulcer of  "right foot (HCC)  Assessment & Plan  Lab Results   Component Value Date    HGBA1C 6.8 (H) 07/05/2024       No results for input(s): \"POCGLU\" in the last 72 hours.      Blood Sugar Average: Last 72 hrs:  (P) 132    Follows with wound care as outpatient  Was seen by podiatry during recent hospitalization  Continue local wound care  Follow up with wound and podiatry    Acute pericarditis  Assessment & Plan  Had LUCRECIA 8/27 and moderate pericardial effusion seen  Per cardiology- no plans for drainage  Completed colchicine and prednisone on 8/27  Needs follow up LUCRECIA in 1 month    New onset a-fib (HCC)  Assessment & Plan  Newly diagnosed in hospital on 8/21/24  Seen by cardiology  Initially on amiodarone infusion- now on PO amiodarone  Also started on lopressor - held last evening due to soft BP.  On eliquis for AC    Transaminitis  Assessment & Plan  Seen on labs in hospital  Improving.  Outpt monitoring.    Acute osteomyelitis of cervical spine (HCC)  Assessment & Plan  Seen by neurosurg  Ancef for 6 weeks per ID  Continue use of C-collar  Monitor neuro checks    FINA (acute kidney injury) on CKD stage 2([facility])  Assessment & Plan  FINA during hospitalization, creat peaked at 3.38. now down to 1.09  Was seen by nephrology  Had avilez which was subsequently removed  Avoid nephrotoxins  Follow up with nephrology after discharge  Kidney function stable.    Type 2 diabetes mellitus (HCC)  Assessment & Plan  Lab Results   Component Value Date    HGBA1C 6.8 (H) 07/05/2024       No results for input(s): \"POCGLU\" in the last 72 hours.      Blood Sugar Average: Last 72 hrs:  (P) 132    Home regimen: metformin and jardiance  Here: continue metformin, jardiance not on formulary here in hospital.  Blood sugar checks all acceptable.  Recommend monitoring blood sugars as an outpt and keeping a log for PCP.  Pt has not required Jardiance in the hospital but may require it when he returns home.        Discharge Physical Examination:  /71 " "(BP Location: Left arm)   Pulse 85   Temp 98 °F (36.7 °C) (Oral)   Resp 20   Ht 6' 3\" (1.905 m)   Wt 113 kg (249 lb 2 oz)   SpO2 99%   BMI 31.14 kg/m²     * MSSA bacteremia  Assessment & Plan  MSSA bacteremia  Patient with prior admission with MSSA bacteremia on 7/28 treated with 7 days of antibiotics  Etiology likely 2/2 right foot ulcer  TTE without evidence of endocarditis  Cleared Blood cultures 8/23     ID consulted, plan for ancef x 6 weeks (end date 10/2 through PICC)      - WBC 15.1 --> 10.0, Hgb 11.5 --> 9.0, platelets 415 --> 262. Last check was on 8/29. Rechecked since it was drawn from PICC, but repeat values similar. Has been receiving IV antibiotics q8h so may be dilutional, but also concern for antibiotic effect on bone marrow with developing pancytopenia. Will discuss with ID  - stable on 9/9  Anticipate transition to oral antibiotic with Duricef on 10/3, to be continued until ESR/CRP improve  LUCRECIA deferred by cardiology, - will obtain in the future when c collar is discontinued  Check limited TTE in 1 month  PT/OT for 3-5 hours a day for 5-6 days/week     Acute osteomyelitis of cervical spine (HCC)  Assessment & Plan  8/20 MRI C-Spine: Imaging findings suspicious for discitis osteomyelitis at C5-C6. 3 mm epidural phlegmon/small abscess is also suspected. There is moderate resultant central stenosis and mild mass effect on the cord  8/25 XR C-Spine Destruction of the C5 to space with collapse of the superior endplate of C6 consistent with discitis osteomyelitis which has progressed from the prior studies. Increased swelling anterior to C5-6.   8/27 MRI - Discitis osteomyelitis again demonstrated at C5-C6   8/29 MRI cervical spine with stable findings and no meningeal enhancement     Continue c-collar per NSGY  Recommending 6 weeks IV antibiotics as above and follow-up repeat upright x-rays ~9/25  Monitor neurologic checks      Pressure injury of deep tissue of buttock  Assessment & Plan  POA to " the [facility]  Found to have bilateral sacro-buttock evolving DTI. Blanching on discharge  Wound care consulted - recs appreciated.  Specialty cushion  Regular Turns/offloading  Monitor and continue local wound care as per Wound care recs.    Significant Findings, Care, Treatment and Services Provided: Acute comprehensive interdisciplinary inpatient rehabilitation including PT, OT, SLP, RN, CM, SW, dietary, psychology, etc.      Physiatry Additions/Comorbidities:      Acute Rehabilitation Center Course: Patient participated in a comprehensive interdisciplinary inpatient rehabilitation program which included involvment of Rehab MD, therapies (PT, OT, and/or SLP), RN, CM, SW, dietary, and psychology services.     Etiologic/Rehabilitation Diagnosis: Impairment of mobility, safety and Activities of Daily Living (ADLs) due to Debility:  16  Debility (Non-cardiac/Non-pulmonary)    Functional Status Upon Admission to [facility]:  PT: Isabel transfers, Isabel ambulation 70' with RW  OT: modA grooming, Isabel UB bathing, modA UB dressing, modA LB bathing, maxALB dressing, Isabel toileting.    Functional Status Upon Discharge from [facility]:   PT: Ind with bed mobility, transfers, ambulation 350' with no AD, stairs.  OT: Ind with all ADLs    Condition at Discharge: good     Discharge instructions/Information to patient and family:   See after visit summary for information provided to patient and family.      Provisions for Follow-Up Care:  See after visit summary for information related to follow-up care and any pertinent home health orders.      Future Appointments   Date Time Provider Department Center   9/19/2024 10:00 AM Carolyn Goode PA-C INFEC DIS BE Hospitalist   9/23/2024  2:30 PM Jorden Heath MD NEPH [facility]   9/26/2024 11:30 AM TANVIR Doss NSURG [facility]   11/15/2024  3:30 PM TANVIR Hooks CARD [facility]   11/18/2024 11:30 AM MO CT 1 MO CT MO HOSP   11/20/2024  1:20 PM Naomi Cummings MD HEM  ONC ALL Practice-Onc   1/7/2025  9:20 AM DO AMMON Garza Practice-[facility]           Disposition: Home    Planned Readmission: No    Discharge Statement   I spent 60 minutes or more discharging the patient.  I had direct contact with the patient on the day of discharge. Greater than 50% of the total time was spent examining patient, answering all patient questions, arranging and discussing plan of care with patient and care team as well as directly providing post-discharge instructions. Additional time then spent on discharge activities.    Discharge Medications:  See after visit summary for reconciled discharge medications provided to patient and family.

## 2024-09-08 NOTE — ASSESSMENT & PLAN NOTE
Lab Results   Component Value Date    HGBA1C 6.8 (H) 07/05/2024       Recent Labs     09/05/24  1055 09/05/24  1538 09/05/24  2115 09/06/24  0645   POCGLU 108 104 108 132         Blood Sugar Average: Last 72 hrs:  (P) 117.8    Home regimen: metformin and jardiance  Here: continue metformin, jardiance not on formulary here in hospital.  Blood sugar checks all acceptable. Will stop accuchecks.

## 2024-09-08 NOTE — PLAN OF CARE
Problem: PAIN - ADULT  Goal: Verbalizes/displays adequate comfort level or baseline comfort level  Description: Interventions:  - Encourage patient to monitor pain and request assistance  - Assess pain using appropriate pain scale  - Administer analgesics based on type and severity of pain and evaluate response  - Implement non-pharmacological measures as appropriate and evaluate response  - Consider cultural and social influences on pain and pain management  - Notify physician/advanced practitioner if interventions unsuccessful or patient reports new pain  Outcome: Progressing     Problem: PAIN - ADULT  Goal: Verbalizes/displays adequate comfort level or baseline comfort level  Description: Interventions:  - Encourage patient to monitor pain and request assistance  - Assess pain using appropriate pain scale  - Administer analgesics based on type and severity of pain and evaluate response  - Implement non-pharmacological measures as appropriate and evaluate response  - Consider cultural and social influences on pain and pain management  - Notify physician/advanced practitioner if interventions unsuccessful or patient reports new pain  Outcome: Progressing     Problem: INFECTION - ADULT  Goal: Absence or prevention of progression during hospitalization  Description: INTERVENTIONS:  - Assess and monitor for signs and symptoms of infection  - Monitor lab/diagnostic results  - Monitor all insertion sites, i.e. indwelling lines, tubes, and drains  - Monitor endotracheal if appropriate and nasal secretions for changes in amount and color  - Hartman appropriate cooling/warming therapies per order  - Administer medications as ordered  - Instruct and encourage patient and family to use good hand hygiene technique  - Identify and instruct in appropriate isolation precautions for identified infection/condition  Outcome: Progressing        Problem: SAFETY ADULT  Goal: Maintain or return to baseline ADL  function  Description: INTERVENTIONS:  -  Assess patient's ability to carry out ADLs; assess patient's baseline for ADL function and identify physical deficits which impact ability to perform ADLs (bathing, care of mouth/teeth, toileting, grooming, dressing, etc.)  - Assess/evaluate cause of self-care deficits   - Assess range of motion  - Assess patient's mobility; develop plan if impaired  - Assess patient's need for assistive devices and provide as appropriate  - Encourage maximum independence but intervene and supervise when necessary  - Involve family in performance of ADLs  - Assess for home care needs following discharge   - Consider OT consult to assist with ADL evaluation and planning for discharge  - Provide patient education as appropriate  Outcome: Progressing

## 2024-09-08 NOTE — PROGRESS NOTES
[facility]  Progress Note  Name: Augustus Coffman I  MRN: [id number]  Unit/Bed#: -01 I Date of Admission: 9/3/2024   Date of Service: 9/8/2024 I Hospital Day: 5    Assessment & Plan   * MSSA bacteremia  Assessment & Plan  Previously hospitalized for MSSA bacteremia on 7/28, thought to be from heel ulcer and completed a course of antibiotics and was discharged home  Per chart, he was complaining of neck pain during that admission  He was sent to ortho outpatient and had an MRI of his neck that reported osteo and discitis so he was sent to the ED for admission  He was admitted on 8/20 and was seen by ID  LUCRECIA showed no endocarditis  MRI showed discitis/osteo at C5-C6  ID recommending 6 weeks of IV ancef    Needs repeat limited LUCRECIA when c collar is discontinued  Needs CBC and CMP weekly while on antibiotics  Has PICC which can be discontinued when ancef complete  ID following - plan for ancef for 6 weeks thru 10/3 then possibly transition to PO antibiotics until SED rate and CRP improve    Other specified anemias  Assessment & Plan  Hgb 9.2.  CBC in AM.    Diabetic ulcer of right foot (HCC)  Assessment & Plan  Lab Results   Component Value Date    HGBA1C 6.8 (H) 07/05/2024       Recent Labs     09/05/24  1055 09/05/24  1538 09/05/24  2115 09/06/24  0645   POCGLU 108 104 108 132         Blood Sugar Average: Last 72 hrs:  (P) 117.8    Follows with wound care as outpatient  Was seen by podiatry during recent hospitalization  Continue local wound care  Follow up with wound and podiatry    Acute pericarditis  Assessment & Plan  Had LUCRECIA 8/27 and moderate pericardial effusion seen  Per cardiology- no plans for drainage  Completed colchicine and prednisone on 8/27  Needs follow up LUCRECIA in 1 month    New onset a-fib (HCC)  Assessment & Plan  Newly diagnosed in hospital on 8/21/24  Seen by cardiology  Initially on amiodarone infusion- now on PO amiodarone  Also started on lopressor -  held 9/6/24 due to soft BPs but has otherwise been receiving the medication.  On eliquis for AC    Transaminitis  Assessment & Plan  Seen on labs in hospital  Improving.  Continue to monitor.    Acute osteomyelitis of cervical spine (HCC)  Assessment & Plan  Seen by neurosurg  Ancef for 6 weeks per ID  Continue use of C-collar  Monitor neuro checks    FINA (acute kidney injury) on CKD stage 2(HCC)  Assessment & Plan  FINA during hospitalization, creat peaked at 3.38. now down to 1.09  Was seen by nephrology  Had fatmata which was subsequently removed  Avoid nephrotoxins  Monitor labs - repeat Monday.  Follow up with nephrology after discharge    Type 2 diabetes mellitus (HCC)  Assessment & Plan  Lab Results   Component Value Date    HGBA1C 6.8 (H) 07/05/2024       Recent Labs     09/05/24  1055 09/05/24  1538 09/05/24 2115 09/06/24  0645   POCGLU 108 104 108 132         Blood Sugar Average: Last 72 hrs:  (P) 117.8    Home regimen: metformin and jardiance  Here: continue metformin, jardiance not on formulary here in hospital.  Blood sugar checks all acceptable. Will stop accuchecks.             The above assessment and plan was reviewed and updated as determined by my evaluation of the patient on 9/8/2024.    Labs:   Results from last 7 days   Lab Units 09/06/24  0948 09/06/24  0547   WBC Thousand/uL 10.62* 10.04   HEMOGLOBIN g/dL 9.2* 9.0*   HEMATOCRIT % 31.0* 30.0*   PLATELETS Thousands/uL 268 262     Results from last 7 days   Lab Units 09/05/24  0500   SODIUM mmol/L 139   POTASSIUM mmol/L 4.6   CHLORIDE mmol/L 102   CO2 mmol/L 25   BUN mg/dL 31*   CREATININE mg/dL 1.09   CALCIUM mg/dL 9.2             Results from last 7 days   Lab Units 09/06/24  0645 09/05/24 2115 09/05/24  1538   POC GLUCOSE mg/dl 132 108 104       Imaging  No orders to display       Review of Scheduled Meds:  Current Facility-Administered Medications   Medication Dose Route Frequency Provider Last Rate    acetaminophen  975 mg Oral Q8H PRN Tino  MD Reyes      amiodarone  200 mg Oral Daily Mayte Sanchez PA-C      apixaban  5 mg Oral BID Mayte Sanchez PA-C      atorvastatin  80 mg Oral Daily With Dinner Mayte Sanchez PA-C      bisacodyl  10 mg Rectal Daily PRN Ashley Depadua, MD      ceFAZolin  2,000 mg Intravenous Q8H Mayte Sanchez PA-C 2,000 mg (09/08/24 0505)    Cholecalciferol  4,000 Units Oral Daily Mayte Sanchez PA-C      docusate sodium  100 mg Oral BID Tnio Chambers MD      melatonin  3 mg Oral HS Tino Chambers MD      metFORMIN  1,000 mg Oral BID With Meals Mayte Sanchez PA-C      methocarbamol  750 mg Oral Q6H PRN Tino Chambers MD      metoprolol tartrate  25 mg Oral Q12H SRIDHAR Mayte Sanchez PA-C      multivitamin stress formula  1 tablet Oral Daily Mayte Sanchez PA-C      ondansetron  4 mg Oral Q6H PRN Ashley Depadua, MD      oxyCODONE  5 mg Oral Q4H PRN Ashley Depadua, MD      polyethylene glycol  17 g Oral Daily Mayte Sanchez PA-C      senna  2 tablet Oral HS Ashley Depadua, MD         Subjective/ HPI: Patient seen and examined. Patients overnight issues or events were reviewed with nursing staff. New or overnight issues include the following:     Pt seen in his room. He is looking forward to CT tomorrow. He denies any current complaints.    ROS:   A 10 point ROS was performed; negative except as noted above.        *Labs /Radiology studies Reviewed  *Medications  reviewed and reconciled as needed  *Please refer to order section for additional ordered labs studies      Physical Examination:  Vitals:   Vitals:    09/07/24 1347 09/07/24 2009 09/07/24 2210 09/08/24 0559   BP: 115/65 139/79 103/56 122/69   BP Location: Left arm Right arm  Right arm   Pulse: 84 61 94 92   Resp: 16 16  16   Temp: 98.3 °F (36.8 °C) 98.7 °F (37.1 °C)  98.2 °F (36.8 °C)   TempSrc: Oral Oral  Oral   SpO2: 98% 98%  98%   Weight:       Height:           General Appearance: NAD; pleasant  HEENT: PERRLA, conjuctiva normal; mucous  membranes moist; face symmetrical  Neck:  Wearing collar  Lungs: clear bilaterally, normal respiratory effort, no retractions, expiratory effort normal, on room air  CV: regular rate and rhythm, no murmurs rubs or gallops noted   ABD: soft non tender, +BS x4  EXT: DP pulses intact, no lymphadenopathy, no edema  Skin: normal turgor, normal texture, no rash  Psych: affect normal, mood normal  Neuro: AAOx3       The above physical exam was reviewed and updated as determined by my evaluation of the patient on 9/8/2024.    Invasive Devices       Central Venous Catheter Line  Duration             CVC Central Lines 08/29/24 Right Other (Comment) 9 days                       VTE Pharmacologic Prophylaxis: Eliquis  Code Status: Level 1 - Full Code  Current Length of Stay: 5 day(s)    Total floor / unit time spent today 30 minutes  Coordination of patient's care was performed in conjunction with consulting services. Time invested included review of patient's labs, vitals, and management of their comorbidities with continued monitoring, examination of patient as well as answering patient questions, documenting her findings and creating progress note in electronic medical record,  ordering appropriate diagnostic testing.       ** Please Note:  voice to text software may have been used in the creation of this document. Although proof errors in transcription or interpretation are a potential of such software**

## 2024-09-08 NOTE — ASSESSMENT & PLAN NOTE
Lab Results   Component Value Date    HGBA1C 6.8 (H) 07/05/2024       Recent Labs     09/05/24  1055 09/05/24  1538 09/05/24 2115 09/06/24  0645   POCGLU 108 104 108 132         Blood Sugar Average: Last 72 hrs:  (P) 117.8    Follows with wound care as outpatient  Was seen by podiatry during recent hospitalization  Continue local wound care  Follow up with wound and podiatry

## 2024-09-08 NOTE — PROGRESS NOTES
"OT daily treatment note       09/08/24 0700   Pain Assessment   Pain Assessment Tool 0-10   Pain Score No Pain   Restrictions/Precautions   Precautions Spinal precautions  (5# WBing limitation BUE)   Braces or Orthoses C/S Collar  (surgical shoe R foot)   Lifestyle   Autonomy \"I hate this thing\" referring to yesenia collar   Eating   Type of Assistance Needed Independent   Physical Assistance Level No physical assistance   Eating CARE Score 6   Oral Hygiene   Type of Assistance Needed Independent   Physical Assistance Level No physical assistance   Oral Hygiene CARE Score 6   Shower/Bathe Self   Type of Assistance Needed Independent   Physical Assistance Level No physical assistance   Comment full shower in stance with yesenia collar and tegaderm over picc line   Shower/Bathe Self CARE Score 6   Upper Body Dressing   Type of Assistance Needed Independent   Physical Assistance Level No physical assistance   Comment shirt and C/S collar mgmt   Upper Body Dressing CARE Score 6   Lower Body Dressing   Type of Assistance Needed Independent   Physical Assistance Level No physical assistance   Lower Body Dressing CARE Score 6   Putting On/Taking Off Footwear   Type of Assistance Needed Independent   Physical Assistance Level No physical assistance   Putting On/Taking Off Footwear CARE Score 6   Picking Up Object   Type of Assistance Needed Independent   Physical Assistance Level No physical assistance   Picking Up Object CARE Score 6   Sit to Stand   Type of Assistance Needed Independent   Physical Assistance Level No physical assistance   Comment no AD   Sit to Stand CARE Score 6   Bed-Chair Transfer   Type of Assistance Needed Independent   Physical Assistance Level No physical assistance   Comment no AD   Chair/Bed-to-Chair Transfer CARE Score 6   Toileting Hygiene   Type of Assistance Needed Independent   Physical Assistance Level No physical assistance   Toileting Hygiene CARE Score 6   Toilet Transfer   Type of " Assistance Needed Independent   Physical Assistance Level No physical assistance   Comment no AD; standard toilet   Toilet Transfer CARE Score 6   Cognition   Overall Cognitive Status WFL   Arousal/Participation Alert   Attention Within functional limits   Orientation Level Oriented X4   Memory Within functional limits   Following Commands Follows multistep commands without difficulty   Additional Activities   Additional Activities Other (Comment)   Additional Activities Comments spent time reviewing cushion options to purchase for use at home as pt sleeps in a recliner and currently has a stage 2 pressure ulcer that is effecting his sleep 2/2 pain. pt has been using a roho in his room thus far that he does not like. OT introduced the KI mobility cushion - had pt trial it in his recliner and pt reported immediate relief. OT provided handout on how to purchase OOP. pt will need a 20x20 in for his recliner. Plan to locate a 20 in KI mobility cushion for pt to use overnight to see if that improves his sleep quality.   Activity Tolerance   Activity Tolerance Patient tolerated treatment well   Assessment   Treatment Assessment Pt engaged in skilled OT session focusing on ADL performance in prep for DC home. Pt has made good  progress during time of stay at the [facility]. Pt is overall functioning at Independent  for ADLs, Independent  for functional transfers and Independent  for functional mobility. Prior to DC Home, DME recommendations include no DME/AD. DME was ordered to maximize functional independence and decrease fall risk. Based on pts functional performance, pt is safe to DC Home w/ recommendations noted above. No further therapy services required. Pt to cont with  nursing for IV ABX mgmt.   Barriers to Discharge None   OT Therapy Minutes   OT Time In 0700   OT Time Out 0830   OT Total Time (minutes) 90   OT Mode of treatment - Individual (minutes) 90   OT Mode of treatment - Concurrent (minutes) 0   OT Mode of  treatment - Group (minutes) 0   OT Mode of treatment - Co-treat (minutes) 0   OT Mode of Treatment - Total time(minutes) 90 minutes   OT Cumulative Minutes 450

## 2024-09-09 ENCOUNTER — TRANSITIONAL CARE MANAGEMENT (OUTPATIENT)
Dept: FAMILY MEDICINE CLINIC | Facility: CLINIC | Age: 69
End: 2024-09-09

## 2024-09-09 VITALS
HEART RATE: 85 BPM | OXYGEN SATURATION: 99 % | TEMPERATURE: 98 F | HEIGHT: 75 IN | BODY MASS INDEX: 30.98 KG/M2 | DIASTOLIC BLOOD PRESSURE: 71 MMHG | WEIGHT: 249.13 LBS | SYSTOLIC BLOOD PRESSURE: 110 MMHG | RESPIRATION RATE: 20 BRPM

## 2024-09-09 LAB
ANION GAP SERPL CALCULATED.3IONS-SCNC: 10 MMOL/L (ref 4–13)
BASOPHILS # BLD AUTO: 0.11 THOUSANDS/ÂΜL (ref 0–0.1)
BASOPHILS NFR BLD AUTO: 1 % (ref 0–1)
BUN SERPL-MCNC: 30 MG/DL (ref 5–25)
CALCIUM SERPL-MCNC: 9.4 MG/DL (ref 8.4–10.2)
CHLORIDE SERPL-SCNC: 105 MMOL/L (ref 96–108)
CO2 SERPL-SCNC: 25 MMOL/L (ref 21–32)
CREAT SERPL-MCNC: 0.93 MG/DL (ref 0.6–1.3)
CRP SERPL QL: 31.3 MG/L
EOSINOPHIL # BLD AUTO: 0.13 THOUSAND/ÂΜL (ref 0–0.61)
EOSINOPHIL NFR BLD AUTO: 2 % (ref 0–6)
ERYTHROCYTE [DISTWIDTH] IN BLOOD BY AUTOMATED COUNT: 19 % (ref 11.6–15.1)
ERYTHROCYTE [SEDIMENTATION RATE] IN BLOOD: >130 MM/HOUR (ref 0–19)
GFR SERPL CREATININE-BSD FRML MDRD: 84 ML/MIN/1.73SQ M
GLUCOSE SERPL-MCNC: 105 MG/DL (ref 65–140)
HCT VFR BLD AUTO: 29.7 % (ref 36.5–49.3)
HGB BLD-MCNC: 9.1 G/DL (ref 12–17)
IMM GRANULOCYTES # BLD AUTO: 0.09 THOUSAND/UL (ref 0–0.2)
IMM GRANULOCYTES NFR BLD AUTO: 1 % (ref 0–2)
LYMPHOCYTES # BLD AUTO: 0.82 THOUSANDS/ÂΜL (ref 0.6–4.47)
LYMPHOCYTES NFR BLD AUTO: 10 % (ref 14–44)
MCH RBC QN AUTO: 25.7 PG (ref 26.8–34.3)
MCHC RBC AUTO-ENTMCNC: 30.6 G/DL (ref 31.4–37.4)
MCV RBC AUTO: 84 FL (ref 82–98)
MONOCYTES # BLD AUTO: 0.65 THOUSAND/ÂΜL (ref 0.17–1.22)
MONOCYTES NFR BLD AUTO: 8 % (ref 4–12)
NEUTROPHILS # BLD AUTO: 6.78 THOUSANDS/ÂΜL (ref 1.85–7.62)
NEUTS SEG NFR BLD AUTO: 78 % (ref 43–75)
NRBC BLD AUTO-RTO: 0 /100 WBCS
PLATELET # BLD AUTO: 250 THOUSANDS/UL (ref 149–390)
PMV BLD AUTO: 11.1 FL (ref 8.9–12.7)
POTASSIUM SERPL-SCNC: 4.5 MMOL/L (ref 3.5–5.3)
RBC # BLD AUTO: 3.54 MILLION/UL (ref 3.88–5.62)
SODIUM SERPL-SCNC: 140 MMOL/L (ref 135–147)
WBC # BLD AUTO: 8.58 THOUSAND/UL (ref 4.31–10.16)

## 2024-09-09 PROCEDURE — 85025 COMPLETE CBC W/AUTO DIFF WBC: CPT | Performed by: INTERNAL MEDICINE

## 2024-09-09 PROCEDURE — 80048 BASIC METABOLIC PNL TOTAL CA: CPT | Performed by: INTERNAL MEDICINE

## 2024-09-09 PROCEDURE — 99239 HOSP IP/OBS DSCHRG MGMT >30: CPT | Performed by: PHYSICIAN ASSISTANT

## 2024-09-09 PROCEDURE — 86140 C-REACTIVE PROTEIN: CPT | Performed by: INTERNAL MEDICINE

## 2024-09-09 PROCEDURE — 85652 RBC SED RATE AUTOMATED: CPT | Performed by: INTERNAL MEDICINE

## 2024-09-09 PROCEDURE — 99233 SBSQ HOSP IP/OBS HIGH 50: CPT | Performed by: PHYSICAL MEDICINE & REHABILITATION

## 2024-09-09 PROCEDURE — 99232 SBSQ HOSP IP/OBS MODERATE 35: CPT | Performed by: INTERNAL MEDICINE

## 2024-09-09 RX ORDER — ACETAMINOPHEN 325 MG/1
650 TABLET ORAL EVERY 6 HOURS PRN
Status: DISCONTINUED | OUTPATIENT
Start: 2024-09-09 | End: 2024-09-09 | Stop reason: HOSPADM

## 2024-09-09 RX ORDER — METHOCARBAMOL 750 MG/1
750 TABLET, FILM COATED ORAL EVERY 6 HOURS PRN
Qty: 56 TABLET | Refills: 0 | Status: SHIPPED | OUTPATIENT
Start: 2024-09-09 | End: 2024-09-23

## 2024-09-09 RX ADMIN — CEFAZOLIN SODIUM 2000 MG: 2 SOLUTION INTRAVENOUS at 05:29

## 2024-09-09 RX ADMIN — DOCUSATE SODIUM 100 MG: 100 CAPSULE, LIQUID FILLED ORAL at 08:27

## 2024-09-09 RX ADMIN — METFORMIN HYDROCHLORIDE 1000 MG: 500 TABLET ORAL at 08:27

## 2024-09-09 RX ADMIN — POLYETHYLENE GLYCOL 3350 17 G: 17 POWDER, FOR SOLUTION ORAL at 08:29

## 2024-09-09 RX ADMIN — APIXABAN 5 MG: 5 TABLET, FILM COATED ORAL at 08:27

## 2024-09-09 RX ADMIN — Medication 4000 UNITS: at 08:27

## 2024-09-09 RX ADMIN — B-COMPLEX W/ C & FOLIC ACID TAB 1 TABLET: TAB at 08:27

## 2024-09-09 RX ADMIN — METOPROLOL TARTRATE 25 MG: 25 TABLET, FILM COATED ORAL at 08:27

## 2024-09-09 RX ADMIN — AMIODARONE HYDROCHLORIDE 200 MG: 200 TABLET ORAL at 08:27

## 2024-09-09 RX ADMIN — METHOCARBAMOL 750 MG: 750 TABLET ORAL at 07:00

## 2024-09-09 NOTE — ASSESSMENT & PLAN NOTE
"Lab Results   Component Value Date    HGBA1C 6.8 (H) 07/05/2024       No results for input(s): \"POCGLU\" in the last 72 hours.      Blood Sugar Average: Last 72 hrs:  (P) 132    Follows with wound care as outpatient  Was seen by podiatry during recent hospitalization  Continue local wound care  Follow up with wound and podiatry  "

## 2024-09-09 NOTE — ASSESSMENT & PLAN NOTE
FINA during hospitalization, creat peaked at 3.38. now down to 1.09  Was seen by nephrology  Had avliez which was subsequently removed  Avoid nephrotoxins  Follow up with nephrology after discharge  Kidney function stable.

## 2024-09-09 NOTE — NURSING NOTE
Clinically Significant Medication Issue  Time of Stay: DISCHARGE    Did a complete drug regimen review identify potential clinically significant medication issues?    no

## 2024-09-09 NOTE — PROGRESS NOTES
Progress Note - Infectious Disease   Augustus Coffman 68 y.o. male MRN: [id number]  Unit/Bed#: -01 Encounter: [id number]      Impression/Plan:  1.  MSSA bacteremia.  Felt to be a primary foot source.  Complicated by cervical spine infection below.  2D echo limited.  Concern remains for endovascular/perivalvular complications given possible prolonged bacteremia. Patient also too high risk for LUCRECIA now.  Has cleared bacteremia and is clinically improving.  -Continue cefazolin through 10/2/2024 to complete 6 weeks of treatment  -Weekly labs with CBCD, creatinine, ESR and CRP on antibiotic  -Anticipate transition to oral antibiotic with Duricef on 10/3  -Will likely continue oral antibiotic until ESR/CRP improve  -Further cardiac imaging and follow-up with cardiology as outpatient  -Messaged the office about follow-up and outpatient lab testing, and provided prescriptions to case management.  -Possible discharge home on IV antibiotics 9/9/2024 if patient continues to do well     2.  C5-C6 discitis/osteomyelitis with epidural phlegmon/abscess.  Found on outpatient imaging.  As a complication of MSSA bacteremia.  Patient fortunately without any focal neurologic deficits.  No other lesions on MRI T and L-spine.  Repeat MRI C-spine now without any epidural phlegmon/abscess.  Only paravertebral abscess remains.  Neck spasms improved.  Sedimentation rate 95 on 9/5/2024 although CRP has decreased to 52.  -Neurosurgical evaluation noted, follow-up outpatient  -Repeat MR imaging as outpatient  -Antibiotics as above  -Recheck sedimentation rate and CRP tomorrow and weekly  -Serial exams  -Anticipate prolonged oral antibiotic course after intravenous done     3 .  Acute pericarditis with new pericardial effusion.  Patient developed abrupt onset of chest pain with ST elevations.  Troponins negative.  Clinical picture seems consistent with acute pericarditis.  Uncertain however if he has a purulent pericarditis but this would  be unlikely based upon the clinical presentation and time course.    -Repeat cardiac imaging as outpatient with cardiology  -No additional ID workup for now     4.  Chronic kidney disease.  Seems to be relatively stable without any current worsening of the renal function.  -Nephrology follow-up  -Full dose IV antibiotics  -Recheck BMP to make sure no worsening  -Volume management     5.  Acute transaminitis.  Acute elevation in LFTs noted likely due to the above.  Right upper quadrant ultrasound unremarkable.  Improving.  Much improved LFTs  -No additional ID workup needed for now     6.  Chronic right foot ulcer with hardware present.  No overt infection of the soft tissue and no deep tracking to suggest osteomyelitis.  -Local wound care  -Podiatry follow-up     7.  Type 2 diabetes and obesity.  Hemoglobin A1c of 6.8.  BMI of 34.  Both as risk factors for recurrent infection.  -Maintain good diabetic control.    Discussed with the primary service the plan to continue the cefazolin and they agree with the plan    Anticipate discharge home later today.    Antibiotics:  Cefazolin 21    Subjective:  Patient has no fever, chills, sweats; no nausea, vomiting, diarrhea; no cough, shortness of breath; no increased pain. No new symptoms.    Objective:  Vitals:  Temp:  [97.5 °F (36.4 °C)-98 °F (36.7 °C)] 98 °F (36.7 °C)  HR:  [84-86] 85  Resp:  [18-20] 20  BP: ()/(59-71) 110/71  SpO2:  [96 %-99 %] 99 %  Temp (24hrs), Av.7 °F (36.5 °C), Min:97.5 °F (36.4 °C), Max:98 °F (36.7 °C)  Current: Temperature: 98 °F (36.7 °C)    Physical Exam:   General Appearance:  Alert, interactive, nontoxic, no acute distress.  [place] collar in place   Throat: Oropharynx moist without lesions.    Lungs:   Clear to auscultation bilaterally; no wheezes, rhonchi or rales; respirations unlabored   Heart:  RRR; no murmur, rub or gallop   Abdomen:   Soft, non-tender, non-distended, positive bowel sounds.     Extremities: No clubbing,  cyanosis or edema   Skin: No new rashes or lesions. No draining wounds noted.       Labs, Imaging, & Other studies:   All pertinent labs and imaging studies were personally reviewed  Results from last 7 days   Lab Units 09/09/24  0700 09/06/24  0948 09/06/24  0547   WBC Thousand/uL 8.58 10.62* 10.04   HEMOGLOBIN g/dL 9.1* 9.2* 9.0*   PLATELETS Thousands/uL 250 268 262     Results from last 7 days   Lab Units 09/05/24  0500   SODIUM mmol/L 139   POTASSIUM mmol/L 4.6   CHLORIDE mmol/L 102   CO2 mmol/L 25   BUN mg/dL 31*   CREATININE mg/dL 1.09   EGFR ml/min/1.73sq m 69   CALCIUM mg/dL 9.2   AST U/L 39   ALT U/L 22   ALK PHOS U/L 116*             Results from last 7 days   Lab Units 09/05/24  0500   CRP mg/L 52.6*

## 2024-09-09 NOTE — PROGRESS NOTES
Physical Medicine and Rehabilitation Progress Note  Augustus Coffman 68 y.o. male MRN: [id number]  Unit/Bed#: [facility] 973-01 Encounter: [id number]    To Review: Mr. Coffman is a 69yo M with medical history of Diabetes with peripheral neuropathy and diabetic foot wound, HTN, CKD, who presented to [facility] on 8/20 for outpatient MRI showing osteomyelitis and diskitis found on an outpatient basis. Recent hospitalization for MSSA bacteremia with R foot wound source treated with IV abx for 7 days. Started on IV abx with plan to treat 6 weeks through 10/2 with transition to Duricef until ESR/CRP normalize. Neurosurgery evaluated and recommended C-collar ATC  due to destruction of the C5 disc space and collapse of C6 SEP. Course c/b pericarditis and pericardial effusion. Did not require window or surgical intervention. Treated with colchicine/steroids. Course also c/b FINA in setting of septic shock which has resolved and Afib/flutter treated with heparin gtt and amio gtt now on oral amio. Admitted to the [facility] on 9/3.     Chief Complaint: medication management    Interval History/Subjective:  No acute events over the weekend. Seen this morning at bedside. Slept poorly due to discomfort at C-collar site, feeling ok this morning though. Looking forward to going home. Last BM 9/8    Went over plan as below and he is agreeable    Denies fever, chills, headaches, changes in vision, chest pain, shortness of breath, presyncope, abdominal pain, nausea, diarrhea, constipation, and insomnia.     ROS:  10 point ROS performed and negative unless mentioned in HPI.      Today's Changes:  Labs reviewed- stable CBC and BMP. CRP downtrending  Will send 2 week course of Robaxin for neck pain to pharmacy downstairs    Assessment/Plan:    * MSSA bacteremia  Assessment & Plan  MSSA bacteremia  Patient with prior admission with MSSA bacteremia on 7/28 treated with 7 days of antibiotics  Etiology likely 2/2 right foot ulcer  TTE without evidence of  endocarditis  Cleared Blood cultures 8/23    ID consulted, plan for ancef x 6 weeks (end date 10/2 through PICC)   - WBC 15.1 --> 10.0, Hgb 11.5 --> 9.0, platelets 415 --> 262. Last check was on 8/29. Rechecked since it was drawn from PICC, but repeat values similar. Has been receiving IV antibiotics q8h so may be dilutional, but also concern for antibiotic effect on bone marrow with developing pancytopenia. Will discuss with ID  - stable on 9/9  Anticipate transition to oral antibiotic with Duricef on 10/3, to be continued until ESR/CRP improve  LUCRECIA deferred by cardiology, - will obtain in the future when c collar is discontinued  Check limited TTE in 1 month  PT/OT for 3-5 hours a day for 5-6 days/week    Acute osteomyelitis of cervical spine (HCC)  Assessment & Plan  8/20 MRI C-Spine: Imaging findings suspicious for discitis osteomyelitis at C5-C6. 3 mm epidural phlegmon/small abscess is also suspected. There is moderate resultant central stenosis and mild mass effect on the cord  8/25 XR C-Spine Destruction of the C5 to space with collapse of the superior endplate of C6 consistent with discitis osteomyelitis which has progressed from the prior studies. Increased swelling anterior to C5-6.   8/27 MRI - Discitis osteomyelitis again demonstrated at C5-C6   8/29 MRI cervical spine with stable findings and no meningeal enhancement    Continue c-collar per NSGY  Recommending 6 weeks IV antibiotics as above and follow-up repeat upright x-rays ~9/25  Monitor neurologic checks     Pressure injury of deep tissue of buttock  Assessment & Plan  POA to the [facility]  Found to have bilateral sacro-buttock evolving DTI. Blanching on discharge  Wound care consulted - recs appreciated.  Specialty cushion  Regular Turns/offloading  Monitor and continue local wound care as per Wound care recs. Outpatient follow up with them    Constipation  Assessment & Plan  Last BM 9/4  Will de-escalate to docusate BID and miralax daily  Monitor and  adjust as appropriate.    Diabetic ulcer of right foot (HCC)  Assessment & Plan  Lab Results   Component Value Date    HGBA1C 6.8 (H) 07/05/2024       Recent Labs     09/04/24  1632 09/04/24 2053 09/05/24  0625 09/05/24  1055   POCGLU 111 148* 137 108       Blood Sugar Average: Last 72 hrs:  (P) 129.5    Skin Care Plan:  1-Right Foot Wound: Per Podiatry recommendations   2-Turn/reposition q2h or when medically stable for pressure re-distribution on skin .  3-Elevate heels to offload pressure.  4-Moisturize skin daily with skin nourishing cream  5-Ehob cushion in chair when out of bed.  6-Preventative Hydraguard to bilateral sacro-buttocks and heels BID and PRN. ]    Podiatry recommended Dermagran to area daily and PRN, wound care order placed    Acute pericarditis  Assessment & Plan  Presented with classic symptoms and with diffuse ST elevation on 8/22 EKG  8/22 Echo: Limited echo for assessment of endocarditis.  The patient's aortic valve is difficult to assess due to calcification but there is no new significant regurgitation.  The mitral valve has hyperechoic thickening at the tips consistent with most likely calcification, these were seen on the echocardiograms in July.  The tricuspid valve similarly was not well-visualized but no new regurgitation.  The pulmonary valve was not well-visualized. Off note patient has new moderate pericardial effusion without specific echocardiographic signs of tamponade  8/27 TTE - EF 50%, mod focal calcification mitral valve, moderate circumferential pericardial effusion, fluid with fibrinous appearance, no sign of tamponade      Drain deferred due to no tamponade  No thoracic surgery indication.  LUCRECIA when c-collar removed in 6 weeks.  Repeat TTE in 4 weeks.  Completed Colchicine and Prednisone on 8/27  Close hemodynamic monitoring  Outpatient follow up with cardiology for monitoring of effusion    New onset a-fib (HCC)  Assessment & Plan  New onset POA with flutter and  fibrillation 2:1 AV conduction  Amio gtt discontinued 8/31  CHADS-VASC score 3    Continue eliquis, lopressor, and amiodarone  Adjust rate control meds as able  Amiodarone to turn into 200 mg daily on 9/6 per cardiology recs    Transaminitis  Assessment & Plan  Mild elevation seen earlier in hospitalization  Has been on schedule tylenol  RUQ US unremarkable for pathology  Recheck tomorrow AM    Neck pain  Assessment & Plan  On Tylenol Q8hr PRN  Robaxin 750mg Q5hr scheduled  Oxycodone 5mg Q4hr PRN  Would not apply heat to this region.  May use other topicals  Monitor and manage as appropriate.    Chronic diastolic CHF (congestive heart failure) (formerly [facility])  Assessment & Plan  Wt Readings from Last 3 Encounters:   09/04/24 113 kg (249 lb 2 oz)   09/03/24 111 kg (244 lb 8 oz)   08/29/24 112 kg (248 lb)     See pericarditis for full cardiac plan  Continue beta blocker and atorvastatin  Not on diuretic or ACE/ARB  Adjustment of regimen as per IM  Outpatient f/u with Cards          Ectatic thoracic aorta (formerly [facility])  Assessment & Plan  Seen on incidental CT  Follows with cardiology  No weightbearing above 5 pounds in upper extremities per his outpatient doctor    Vitamin D deficiency  Assessment & Plan  Continue repletion  Normal level of 47 four months ago    FINA (acute kidney injury) on CKD stage 2(formerly [facility])  Assessment & Plan  Acute elevation in creatinine noted from baseline of 1-1.2. to ~3.4 with hyperkalemia  In setting of septic shock  Last Cr 1.09 on 9/5  Monitor intermittently     Acute blood loss anemia  Assessment & Plan  Baseline Hgb 10-12  8/29 11.5 --> 9/6 9.0 --> 9.1 on 9/9  Monitor for s/s bleeding  See bacteremia for plan    Type 2 diabetes mellitus (formerly [facility])  Assessment & Plan  Lab Results   Component Value Date    HGBA1C 6.8 (H) 07/05/2024       Recent Labs     09/04/24  1632 09/04/24 2053 09/05/24  0625 09/05/24  1055   POCGLU 111 148* 137 108       Blood Sugar Average: Last 72 hrs:  (P) 129.5  On jardiance & metformin  outpatient, held during FINA  Complicated by diabetic ulcer as noted to be likely source as above  Continue SSI per IM  Consider adding back orals given improved kidney function to baseline    Hypercholesteremia  Assessment & Plan  Continue statin    History of hypertension  Assessment & Plan  Home: amlodipine 10 mg, losartan 100 mg, Toprol 100 daily  Here: lopressor 25 BID, normotensive, which he will go home on    Now with hypotension but asymptomatic  Lopressor held for parameters, IM to adjust  Robaxin made PRN  Monitor for symptoms        Health Maintenance  #Delirium/Sleep: At risk. Optimize sleep/wake, pain, bowel, bladder management. Avoid deliriogenic meds and physical restraint. Environmental/behavioral interventions.   #Bladder: Voiding and Continent  #Skin/Pressure Injury Prevention: Turn Q2hr in bed, with weight shifts [id number] in wheelchair. Float heels in bed.  #DVT Prophylaxis: Fully anticoagulated on eliquis.  #GI Prophylaxis: n/a  #Code Status: full code  #FEN: diabetic diet with glucerna  #Dispo: Teams 9/5 --> ADD 9/9 with home nursing for antibiotics. Needs f/u with NSGY, ID, cardiology, cardiothoracic surgery, orthopedic surgery, wound care, podiatry, and PCP      Objective:    Functional Update:  PT: Ind with bed mobility, transfers, ambulation 350' with no AD, stairs.  OT: Ind with all ADLs    Allergies per EMR    Physical Exam:  Temp:  [97.5 °F (36.4 °C)-98 °F (36.7 °C)] 98 °F (36.7 °C)  HR:  [84-86] 85  Resp:  [18-20] 20  BP: ()/(59-71) 110/71  Oxygen Therapy  SpO2: 99 %        Gen: No acute distress, Well-nourished, well-appearing.  HEENT: Moist mucus membranes, Normocephalic/Atraumatic.c-collar around neck  Cardiovascular: Regular rate, rhythm, S1/S2. Distal pulses palpable  Heme/Extr: No edema/clubbing/cyanosis  Pulmonary: Non-labored breathing. Lungs CTAB  : No avilez  GI: Soft, non-tender, non-distended. BS+  MSK: ROM is WFL in all extremities. No effusions or deformities. Bulk  is symmetric. See below for MMT scores.   Integumentary: Skin is warm, dry. Blanching sacral wound covered with mepilex. Roughly 2x2 cm. No areas of erythema around c-collar  Neuro: AAOx3, CN 2-12 intact. Sensation intact to light touch throughout. Speech is intact. Appropriate to questioning. Tone is normal.    Coord: arm roll without catch, FTN without dysmetria or tremor, DOC without fatiguing     MMT:   Strength:   Right  Left  Site  Right  Left  Site    5 5  S Ab: Shoulder Abductors  5  5  HF: Hip Flexors    5 5  EF: Elbow Flexors  5  5 KF: Knee Flexors    5  5  EE: Elbow Extensors  5  5  KE: Knee Extensors    5  5  WE: Wrist Extensors  5  5  DR: Dorsi Flexors    5  5  FF: Finger Flexors  5  5  PF: Plantar Flexors    5  5  HI: Hand Intrinsics  5  5  EHL: Extensor Hallucis Longus   Psych: Normal mood and affect.        Diagnostic Studies: reviewed, no new imaging    Laboratory:  Reviewed   Results from last 7 days   Lab Units 09/09/24  0700 09/06/24  0948 09/06/24  0547   HEMOGLOBIN g/dL 9.1* 9.2* 9.0*   HEMATOCRIT % 29.7* 31.0* 30.0*   WBC Thousand/uL 8.58 10.62* 10.04       Results from last 7 days   Lab Units 09/09/24  0700 09/05/24  0500   BUN mg/dL 30* 31*   POTASSIUM mmol/L 4.5 4.6   CHLORIDE mmol/L 105 102   CREATININE mg/dL 0.93 1.09   AST U/L  --  39   ALT U/L  --  22            Patient Active Problem List   Diagnosis    Diabetes 1.5, managed as type 2 (HCC)    History of hypertension    Hypercholesteremia    Hammer toe of right foot    Stasis dermatitis of both legs    Diabetic peripheral neuropathy (HCC)    Gout    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HCC)    Obesity with body mass index 30 or greater    Type 2 diabetes mellitus (HCC)    Retroperitoneal hematoma    Mesenteric lymphadenopathy    Acute blood loss anemia    FINA (acute kidney injury) on CKD stage 2(HCC)    GI bleed    Pleural effusion    Chronic venous hypertension (idiopathic) with ulcer of right lower extremity (HCC)    Rash    Stage 2  chronic kidney disease    Hypertensive kidney disease with stage 2 chronic kidney disease    Other proteinuria    Obesity, morbid (HCC)    Follicular lymphoma grade I of lymph nodes of multiple sites (HCC)    Vitamin D deficiency    Stage 3a chronic kidney disease (HCC)    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 (HCC)    Ectatic thoracic aorta (HCC)    Aortic stenosis with trileaflet valve    Sepsis without acute organ dysfunction (HCC)    Acute respiratory failure with hypoxia (HCC)    Acute hyponatremia    Chronic diastolic CHF (congestive heart failure) (HCC)    Encounter for deep vein thrombosis (DVT) prophylaxis    Hyponatremia    Neck pain    Acute osteomyelitis of cervical spine (HCC)    Transaminitis    Acute renal failure with oliguria  (HCC)    MSSA bacteremia    New onset a-fib (HCC)    Acute pericarditis    Diabetic ulcer of right foot (HCC)    Constipation    Pressure injury of deep tissue of buttock    Other specified anemias         Medications  Current Facility-Administered Medications   Medication Dose Route Frequency Provider Last Rate    acetaminophen  650 mg Oral Q6H PRN Ashley Depadua, MD      amiodarone  200 mg Oral Daily Mayte Sanchez PA-C      apixaban  5 mg Oral BID Mayte Sanchez PA-C      atorvastatin  80 mg Oral Daily With Dinner Mayte Sanchez PA-C      ceFAZolin  2,000 mg Intravenous Q8H Mayte Sanchez PA-C Stopped (09/09/24 0615)    Cholecalciferol  4,000 Units Oral Daily Mayte Sanchez PA-C      docusate sodium  100 mg Oral BID Tino Chambers MD      melatonin  3 mg Oral HS Tino Chambers MD      metFORMIN  1,000 mg Oral BID With Meals Mayte Sanchez PA-C      methocarbamol  750 mg Oral Q6H PRN Tino Chambers MD      metoprolol tartrate  25 mg Oral Q12H [facility] Mayte Sanchez PA-C      multivitamin stress formula  1 tablet Oral Daily Mayte Sanchez PA-C      polyethylene glycol  17 g Oral Daily Mayte Sanchez PA-C      senna  2 tablet Oral HS Ashley Depadua, MD             ** Please Note: Fluency Direct voice to text software may have been used in the creation of this document. **

## 2024-09-09 NOTE — ASSESSMENT & PLAN NOTE
"Lab Results   Component Value Date    HGBA1C 6.8 (H) 07/05/2024       No results for input(s): \"POCGLU\" in the last 72 hours.      Blood Sugar Average: Last 72 hrs:  (P) 132    Home regimen: metformin and jardiance  Here: continue metformin, jardiance not on formulary here in hospital.  Blood sugar checks all acceptable.  Recommend monitoring blood sugars as an outpt and keeping a log for PCP.  Pt has not required Jardiance in the hospital but may require it when he returns home.  "

## 2024-09-09 NOTE — NURSING NOTE
AVS reviewed w/ patient and spouse. All questions answered. Patients wife provided with education on changing dressing on sacrum. She was able to demonstrate and complete the dressing. Patient ambulates and transfers independently. Patient is continent of bowel and bladder. IV antibiotic's delivered to patients room. Patient was provided with dressing supplies. Patient discharged home.

## 2024-09-09 NOTE — ASSESSMENT & PLAN NOTE
Newly diagnosed in hospital on 8/21/24  Seen by cardiology  Initially on amiodarone infusion- now on PO amiodarone  Also started on lopressor - held last evening due to soft BP.  On eliquis for AC

## 2024-09-09 NOTE — ASSESSMENT & PLAN NOTE
Previously hospitalized for MSSA bacteremia on 7/28, thought to be from heel ulcer and completed a course of antibiotics and was discharged home  Per chart, he was complaining of neck pain during that admission  He was sent to ortho outpatient and had an MRI of his neck that reported osteo and discitis so he was sent to the ED for admission  He was admitted on 8/20 and was seen by ID  LUCRECIA showed no endocarditis  MRI showed discitis/osteo at C5-C6  ID recommending 6 weeks of IV ancef    Needs repeat limited LUCRECIA when c collar is discontinued  Needs CBC and CMP weekly while on antibiotics  Has PICC which can be discontinued when ancef complete  ID following - plan for ancef for 6 weeks thru 10/3 then possibly transition to PO antibiotics until SED rate and CRP improve  Outpt follow-up with ID as scheduled.

## 2024-09-10 ENCOUNTER — DOCUMENTATION (OUTPATIENT)
Dept: INFECTIOUS DISEASES | Facility: CLINIC | Age: 69
End: 2024-09-10

## 2024-09-10 DIAGNOSIS — B95.61 MSSA BACTEREMIA: ICD-10-CM

## 2024-09-10 DIAGNOSIS — R78.81 MSSA BACTEREMIA: ICD-10-CM

## 2024-09-10 DIAGNOSIS — E11.621 DIABETIC ULCER OF RIGHT FOOT (HCC): Primary | ICD-10-CM

## 2024-09-10 DIAGNOSIS — L97.519 DIABETIC ULCER OF RIGHT FOOT (HCC): Primary | ICD-10-CM

## 2024-09-10 NOTE — PROGRESS NOTES
OPAT NOTE    Supervising/Discharge physician: Dr. Vergara    Diagnosis:  -MSSA bacteremia   -Chronic right foot ulcer with hardware present.      Drug: cefazolin 2g IV Q8    Labs/Frequency: weekly CBC with diff, Cre, esr, crp    End Date:10/02/2024    Infusion/VNA/SNF contact:  [facility]  Phone: [phone]  Fax: [phone]    Next appointment: 9/19/2024 10:00am    Imaging:  -MRI of C-spine needed 6-8 weeks. Confirmed with Dr. Vergara that Neurosurgery should schedule.        RN assigned: Eleazar Mehta

## 2024-09-10 NOTE — PROGRESS NOTES
09/10/24 1209   Hello, [Guardian’s Name / Patient’s Name], this is [Caller Name] from [facility], and our clinical care team wanted to check on you / your child after your recent visit to the hospital. It will only take 3-5 minutes. Is this a good time?   Discharge Call Type/ Specific Diagnosis: [facility] General   [facility] Discharge Follow- Up   [facility] Follow- Up Time Frame 5 Day follow up   [facility] 5 Day General Follow- up Questions   Patient location at time of call Home   Were you/ your loved one's discharge instructions clear and understandable Yes   Have you Filled you/ your loved one's new prescriptions Yes   Do you have questions about your medications? No   Are you/ your loved one taking all medications as prescribed? Yes   Are you/ your loved ones having any unusual symptoms or problems? No   Follow up appointments   Follow up appointment with PCP Future appointment scheduled   Is there anything preventing you from keeping this appointment? No   Healthy Lifestyle   Are you participating in ongoing therapy? Yes   [facility] discharge phone call follow ups and opportunities   How well do you feel the team did preparing you to return home? Very well   Are there any physicians, nurses, therapists or hospital staff you would like us to recognize for doing a very good job? Yes - Please provide name  (Augustin and Dinesh from the critical care unit were wonderful)   Is there a need for follow up? No   Call Complete   Discharge phone call complete? Complete

## 2024-09-10 NOTE — CASE MANAGEMENT
Team dc summary - pt made good progress and returned home w/family with contd [facility] services for nursing and physical therapy through [facility]. Pt received home iv abx through [facility] and was informed of oop expenses. No dme needs identified. Pts wife present for dc instructions and receiving nursing and therapy education. Wife provided transport home.

## 2024-09-11 ENCOUNTER — TELEPHONE (OUTPATIENT)
Dept: NEPHROLOGY | Facility: CLINIC | Age: 69
End: 2024-09-11

## 2024-09-11 NOTE — TELEPHONE ENCOUNTER
Called left voice message to remind patient to get non-fasting labs done 1 week prior to 09/23/24 scheduled follow-up appointment with Dr.Jwalant Heath. also advised as a reminder If labs / testing are not done in the appropriate time for scheduled appointment, appointment may need to be rescheduled.

## 2024-09-11 NOTE — ADDENDUM NOTE
Addended by: FIONA GALLEGO on: 9/11/2024 09:34 AM     Modules accepted: Orders, Level of Service

## 2024-09-12 ENCOUNTER — OFFICE VISIT (OUTPATIENT)
Dept: FAMILY MEDICINE CLINIC | Facility: CLINIC | Age: 69
End: 2024-09-12
Payer: MEDICARE

## 2024-09-12 VITALS
BODY MASS INDEX: 33.57 KG/M2 | WEIGHT: 270 LBS | SYSTOLIC BLOOD PRESSURE: 110 MMHG | TEMPERATURE: 97.2 F | OXYGEN SATURATION: 99 % | DIASTOLIC BLOOD PRESSURE: 62 MMHG | HEIGHT: 75 IN | HEART RATE: 63 BPM

## 2024-09-12 DIAGNOSIS — I30.9 ACUTE PERICARDITIS, UNSPECIFIED TYPE: ICD-10-CM

## 2024-09-12 DIAGNOSIS — I48.91 NEW ONSET A-FIB (HCC): ICD-10-CM

## 2024-09-12 DIAGNOSIS — L97.419 DIABETIC ULCER OF RIGHT HEEL ASSOCIATED WITH TYPE 2 DIABETES MELLITUS, UNSPECIFIED ULCER STAGE (HCC): ICD-10-CM

## 2024-09-12 DIAGNOSIS — B95.61 MSSA BACTEREMIA: ICD-10-CM

## 2024-09-12 DIAGNOSIS — R78.81 MSSA BACTEREMIA: ICD-10-CM

## 2024-09-12 DIAGNOSIS — M46.22 ACUTE OSTEOMYELITIS OF CERVICAL SPINE (HCC): Primary | ICD-10-CM

## 2024-09-12 DIAGNOSIS — E11.621 DIABETIC ULCER OF RIGHT HEEL ASSOCIATED WITH TYPE 2 DIABETES MELLITUS, UNSPECIFIED ULCER STAGE (HCC): ICD-10-CM

## 2024-09-12 PROCEDURE — 99496 TRANSJ CARE MGMT HIGH F2F 7D: CPT | Performed by: FAMILY MEDICINE

## 2024-09-12 NOTE — PROGRESS NOTES
Transition of Care Visit  Name: Augustus Coffman      : 1955      MRN: [id number]  Encounter Provider: Gwen Lazo DO  Encounter Date: 2024   Encounter department: [facility] [address] [place] [address]    Assessment & Plan  Acute osteomyelitis of cervical spine (HCC)         MSSA bacteremia  Continue with ABX. Planning for repeat LUCRECIA after c collar d/c'd       New onset a-fib (HCC)  Continue with amiodarone and follow up with cardiology.        Acute pericarditis, unspecified type  Completed colchicine and prednisone, follow up LUCRECIA after c collar d/c'd.        Diabetic ulcer of right heel associated with type 2 diabetes mellitus, unspecified ulcer stage (HCC)    Lab Results   Component Value Date    HGBA1C 6.8 (H) 2024            Depression Screening and Follow-up Plan: Patient was screened for depression during today's encounter. They screened negative with a PHQ-2 score of 0.    History of Present Illness     Transitional Care Management Review:   Augustus Coffman is a 68 y.o. male here for TCM follow up.     During the TCM phone call patient stated:  TCM Call       Date and time call was made  2024  1:56 PM    Hospital care reviewed  Records reviewed    Patient was hospitialized at  [facility]; [facility]    Date of Admission  24    Date of discharge  24    Diagnosis  BSSA Bact    Disposition  Home    Were the patients medications reviewed and updated  Yes    Current Symptoms  None          TCM Call       Post hospital issues  None    Should patient be enrolled in anticoag monitoring?  No    Scheduled for follow up?  Yes    Did you obtain your prescribed medications  Yes    Do you need help managing your prescriptions or medications  No    Is transportation to your appointment needed  No    I have advised the patient to call PCP with any new or worsening symptoms  TARA Slade    Living Arrangements  Family members    Support  "System  Family    The type of support provided  Physical    Do you have social support  Yes, as much as I need    Are you recieving any outpatient services  No    Are you recieving home care services  No    Are you using any community resources  No    Current waiver services  No    Have you fallen in the last 12 months  No    Interperter language line needed  No    Counseling  Patient    Counseling topics  Activities of daily living; Importance of RX compliance          HPI    Patient presents to the office for follow up after hospitalization.     States that he is doing well.     He has all of his follow up scheduled. He is compliant with all of his medications. States that he is feeling much better. He is wearing the neck collar.  Denies any questions or concerns.     Review of Systems  Objective     /62   Pulse 63   Temp (!) 97.2 °F (36.2 °C) (Tympanic)   Ht 6' 3\" (1.905 m)   Wt 122 kg (270 lb)   SpO2 99%   BMI 33.75 kg/m²     Physical Exam  Vitals reviewed.   Constitutional:       General: He is not in acute distress.     Appearance: Normal appearance.      Comments: Neck collar in place.    HENT:      Head: Normocephalic and atraumatic.      Right Ear: External ear normal.      Left Ear: External ear normal.      Nose: Nose normal.      Mouth/Throat:      Mouth: Mucous membranes are moist.   Eyes:      Extraocular Movements: Extraocular movements intact.      Conjunctiva/sclera: Conjunctivae normal.      Pupils: Pupils are equal, round, and reactive to light.   Cardiovascular:      Rate and Rhythm: Normal rate and regular rhythm.      Heart sounds: Normal heart sounds.   Pulmonary:      Effort: Pulmonary effort is normal.      Breath sounds: Normal breath sounds. No wheezing, rhonchi or rales.   Abdominal:      General: Bowel sounds are normal. There is no distension.      Palpations: Abdomen is soft.      Tenderness: There is no abdominal tenderness.   Musculoskeletal:      Cervical back: Neck " supple.      Right lower leg: No edema.      Left lower leg: No edema.   Lymphadenopathy:      Cervical: No cervical adenopathy.   Skin:     General: Skin is warm.      Capillary Refill: Capillary refill takes less than 2 seconds.      Findings: No rash.   Neurological:      Mental Status: He is alert. Mental status is at baseline.       Medications have been reviewed by provider in current encounter    Administrative Statements       DO Justin Garza [facility]

## 2024-09-12 NOTE — OCCUPATIONAL THERAPY NOTE
OT Discharge Summary Note     Principal Problem:    MSSA bacteremia  Active Problems:    History of hypertension    Hypercholesteremia    Type 2 diabetes mellitus (HCC)    Acute blood loss anemia    FINA (acute kidney injury) on CKD stage 2(HCC)    Vitamin D deficiency    Ectatic thoracic aorta (HCC)    Chronic diastolic CHF (congestive heart failure) (HCC)    Neck pain    Acute osteomyelitis of cervical spine (HCC)    Transaminitis    New onset a-fib (HCC)    Acute pericarditis    Diabetic ulcer of right foot (HCC)    Constipation    Pressure injury of deep tissue of buttock    Other specified anemias        Occupational Therapy LTG's  Eating Oral care Bathing LB dress UB dress   Eating Goal: 06. Independent - Patient completes the activity by him/herself with no assistance from a helper. Oral Hygiene Goal: 06. Independent - Patient completes the activity by him/herself with no assistance from a helper.  Shower/bathe self Goal: 06. Independent - Patient completes the activity by him/herself with no assistance from a helper. Lower body dressing Goal: 06. Independent - Patient completes the activity by him/herself with no assistance from a helper. Upper body dressing Goal: 06. Independent - Patient completes the activity by him/herself with no assistance from a helper.   Toileting Toilet txf Func txf IADL Med    Toileting hygiene Goal: 06. Independent - Patient completes the activity by him/herself with no assistance from a helper. Toilet transfer Goal: 06. Independent - Patient completes the activity by him/herself with no assistance from a helper.   Assist Level: Independent         Pt has made good progress during time of stay at the [facility]. Pt is overall functioning at Independent for ADLs, Independent for functional transfers and Independent for functional mobility. Prior to DC Home, DME recommendations include no DME/AD. DME was ordered to maximize functional independence and decrease fall risk. Based on pts  functional performance, pt is safe to DC Home w/ recommendations noted above. No further therapy services required. Pt to cont with HH nursing for IV ABX mgmt.

## 2024-09-13 NOTE — PROGRESS NOTES
Outpatient Progress Note - [facility] Infectious Disease   Augustus Coffman 68 y.o. male MRN: [id number]  Encounter: [id number]    Impression/Plan:  1.  MSSA bacteremia.  Felt to be a primary foot source from a right foot ulceration with hardware present.  Complicated by cervical spine infection below.  2D echo limited.  Concern remains for endovascular/perivalvular complications given possible prolonged bacteremia. Patient was also too high risk for LUCRECIA. Blood cultures cleared on 8/22. Discharged on 6 week course of cefazolin. Most recent labs reviewed from 9/16: WBC WNL at 8.32, Serum creatinine WNL at 1.28, but has increased from 0.93 on 9/9. 9/9 CRP improved from 52.6 > 31.3, however ESR has increased from 95 on 9/5 to >130. Will continue to trend inflammatory markers. Suspect patient will need prolonged PO antibiotic course after completing IV antibiotic course.   -Continue cefazolin through 10/2/2024 to complete 6 weeks of treatment  -Weekly labs with CBCD, creatinine, ESR and CRP while on IV antibiotic  -Transition to oral antibiotic with Duricef 500mg q12h on 10/3  -Will likely continue oral antibiotic until ESR/CRP improve/stabilize  -Obtain repeat MRI C spine in 2-3 weeks as below  -Further cardiac imaging and follow-up with cardiology as outpatient  -IR referral placed for tunneled central line removal on 10/3 after completing IV antibiotic course  -RTC in 2 weeks     2.  C5-C6 discitis/osteomyelitis with epidural phlegmon/abscess.  Found on outpatient imaging.  As a complication of MSSA bacteremia.  Patient fortunately without any focal neurologic deficits.  No other lesions on MRI T and L-spine.  Repeat MRI C-spine now without any epidural phlegmon/abscess.  Only paravertebral abscess remains. Sedimentation rate increased to >130 on 9/9, though CRP has downtrended 52 > 31.3.   -Continue antibiotics as above  -Follow-up with outpatient neurosurgery  -Schedule MRI C spine in 2-3 weeks to re-assess  paravertebral abscess  -Continue to recheck sedimentation rate and CRP weekly  -Anticipate prolonged oral antibiotic course with PO Duricef after completing IV antibiotic course as above     3 .  Acute pericarditis with new pericardial effusion.  Patient developed abrupt onset of chest pain with ST elevations.  Troponins negative.  Clinical picture consistent with acute pericarditis.  Uncertain however if he had a purulent pericarditis.   -Follow-up with cardiology. Appointment scheduled for 11/15/24.   -Repeat cardiac imaging as outpatient with cardiology     4.  Chronic kidney disease. Relatively stable without any current worsening of the renal function. Serum creatinine still within normal limits, but has increased from 0.93 to 1.28. Will continue to trend and make dose adjustments if needed.   -Will dose adjust antibiotics as needed  -Weekly BMP while on IV antibiotics to continue to monitor serum creatinine     5.  Type 2 diabetes and obesity. C/b peripheral neuropathy. Hemoglobin A1c of 6.8.  BMI of 34.  Both as risk factors for recurrent infection.  -Recommend tight glycemic control    Antibiotics:  IV cefazolin    Subjective:  Augustus Coffman is a 67 y/o male with pmhx of T2DM c/b peripheral neuropathy, diabetic foot wound, HTN, CHF, stasis dermatitis of bilateral lower extremities, HLD, CKD, follicular lymphoma now in remission, who presented to [facility] ED on 8/20 after an outpatient MRI revealed cervical osteomyelitis/discitis at C5-C6 with 3 mm epidural phlegmon/small abscess. Patient had an earlier admission at the end of July for right diabetic foot wound and culture isolated MSSA.  Admission blood culture was positive.  Patient was treated with short course of antibiotic.  Course of the time was complicated by neck pain and CT imaging had been unremarkable favoring musculoskeletal process.  Patient was advised to follow-up with orthopedics and when he did based on his exam and MRI was  ordered which demonstrated the osteomyelitis and he was recommended to come into the ER. Blood cultures on admission resulted positive for MSSA. Patient was started on IV cefazolin. While on antibiotics, patient started to developed acute onset chest pain on 8/22. EKG notable for ST elevations. Troponins unremarkable. Echo was completed which showed a new moderate pericardial effusion not present on prior echo concerning for pericarditis with possible purulent effusion. Patient was transferred to [facility] on 8/23 for consideration of surgical intervention and possible pericardiocentesis. Hospital course complicated by Afib with RVR requiring ICU admission. Patient was evaluated by thoracic surgery and there was no indication for acute surgical intervention for the pericardial effusion. 2D echo limited.  Concern remained for endovascular/perivalvular complications given possible prolonged bacteremia. Patient also too high risk for LUCRECIA now. Blood cultures cleared on 8/22. Discharged on 6 week course of cefazolin. He now presents to ID clinic for follow-up.     Has been feeling queasy, but no significant nausea or vomiting. Not much of an appetite. Metallic taste in his mouth. Neck pain has improved, was exacerbated by the car ride here. Usually around a 2/10, currently a 4/10. Chest pain has resolved. No cough or SOB. No fevers or chills. No night sweats. No missed doses of antibiotic. No rashes. No new symptoms.    Has follow-up with Nephrology next week on 9/23 and Neurosurgery on 9/26.     Objective:  Vitals:    Vitals:    09/19/24 0942   BP: 105/70   Pulse: 74   Resp: 17   Temp: (!) 97.4 °F (36.3 °C)   SpO2: 94%     Physical Exam:   General Appearance:  Chronically ill appearing, in C-spine collar, alert, interactive, nontoxic, no acute distress.   Throat: Oropharynx moist without lesions.    Lungs:   Clear to auscultation bilaterally; no wheezes, rhonchi or rales; respirations unlabored on room air.   Heart:  RRR;  +murmur, rub or gallop. RIJ tunneled CVC CDI. No erythema or drainage.    Abdomen:   Soft, non-tender, non-distended, positive bowel sounds.     Extremities: No clubbing or cyanosis. Bilateral lower extremity venous stasis changes. Right foot in walking shoe.    Skin: No new rashes, lesions, or draining wounds noted on exposed skin.     Labs, Imaging, & Other studies:   All pertinent labs and imaging studies were personally reviewed by me including  Results from last 7 days   Lab Units 09/09/24  0700   WBC Thousand/uL 8.58   HEMOGLOBIN g/dL 9.1*   PLATELETS Thousands/uL 250     Results from last 7 days   Lab Units 09/09/24  0700   POTASSIUM mmol/L 4.5   CHLORIDE mmol/L 105   CO2 mmol/L 25   BUN mg/dL 30*   CREATININE mg/dL 0.93   EGFR ml/min/1.73sq m 84   CALCIUM mg/dL 9.4     9/9 ESR: increased from 95 to >130  9/9 CRP: improved from 52.6 > 31.3    Imaging Studies:  I have personally reviewed pertinent imaging study reports and images in PACS.     Carolyn Goode PA-C  [facility]

## 2024-09-16 ENCOUNTER — OFFICE VISIT (OUTPATIENT)
Dept: WOUND CARE | Facility: CLINIC | Age: 69
End: 2024-09-16
Payer: MEDICARE

## 2024-09-16 ENCOUNTER — TELEPHONE (OUTPATIENT)
Age: 69
End: 2024-09-16

## 2024-09-16 ENCOUNTER — APPOINTMENT (OUTPATIENT)
Dept: LAB | Facility: HOSPITAL | Age: 69
End: 2024-09-16
Payer: MEDICARE

## 2024-09-16 VITALS
HEART RATE: 87 BPM | DIASTOLIC BLOOD PRESSURE: 79 MMHG | SYSTOLIC BLOOD PRESSURE: 127 MMHG | RESPIRATION RATE: 20 BRPM | TEMPERATURE: 97.1 F

## 2024-09-16 DIAGNOSIS — L97.512 DIABETIC ULCER OF OTHER PART OF RIGHT FOOT ASSOCIATED WITH DIABETES MELLITUS DUE TO UNDERLYING CONDITION, WITH FAT LAYER EXPOSED (HCC): Primary | ICD-10-CM

## 2024-09-16 DIAGNOSIS — N18.31 STAGE 3A CHRONIC KIDNEY DISEASE (HCC): ICD-10-CM

## 2024-09-16 DIAGNOSIS — E08.621 DIABETIC ULCER OF OTHER PART OF RIGHT FOOT ASSOCIATED WITH DIABETES MELLITUS DUE TO UNDERLYING CONDITION, WITH FAT LAYER EXPOSED (HCC): Primary | ICD-10-CM

## 2024-09-16 LAB
25(OH)D3 SERPL-MCNC: 62.1 NG/ML (ref 30–100)
ANION GAP SERPL CALCULATED.3IONS-SCNC: 7 MMOL/L (ref 4–13)
BACTERIA UR QL AUTO: ABNORMAL /HPF
BASOPHILS # BLD AUTO: 0.1 THOUSANDS/ΜL (ref 0–0.1)
BASOPHILS NFR BLD AUTO: 1 % (ref 0–1)
BILIRUB UR QL STRIP: NEGATIVE
BUN SERPL-MCNC: 22 MG/DL (ref 5–25)
CALCIUM SERPL-MCNC: 9.3 MG/DL (ref 8.4–10.2)
CHLORIDE SERPL-SCNC: 104 MMOL/L (ref 96–108)
CLARITY UR: CLEAR
CO2 SERPL-SCNC: 28 MMOL/L (ref 21–32)
COLOR UR: YELLOW
CREAT SERPL-MCNC: 1.28 MG/DL (ref 0.6–1.3)
CREAT UR-MCNC: 124.8 MG/DL
EOSINOPHIL # BLD AUTO: 0.24 THOUSAND/ΜL (ref 0–0.61)
EOSINOPHIL NFR BLD AUTO: 3 % (ref 0–6)
ERYTHROCYTE [DISTWIDTH] IN BLOOD BY AUTOMATED COUNT: 20.8 % (ref 11.6–15.1)
GFR SERPL CREATININE-BSD FRML MDRD: 57 ML/MIN/1.73SQ M
GLUCOSE P FAST SERPL-MCNC: 115 MG/DL (ref 65–99)
GLUCOSE UR STRIP-MCNC: NEGATIVE MG/DL
HCT VFR BLD AUTO: 33.9 % (ref 36.5–49.3)
HGB BLD-MCNC: 9.9 G/DL (ref 12–17)
HGB UR QL STRIP.AUTO: NEGATIVE
HYALINE CASTS #/AREA URNS LPF: ABNORMAL /LPF
IMM GRANULOCYTES # BLD AUTO: 0.04 THOUSAND/UL (ref 0–0.2)
IMM GRANULOCYTES NFR BLD AUTO: 1 % (ref 0–2)
KETONES UR STRIP-MCNC: NEGATIVE MG/DL
LEUKOCYTE ESTERASE UR QL STRIP: NEGATIVE
LYMPHOCYTES # BLD AUTO: 0.71 THOUSANDS/ΜL (ref 0.6–4.47)
LYMPHOCYTES NFR BLD AUTO: 9 % (ref 14–44)
MCH RBC QN AUTO: 25.1 PG (ref 26.8–34.3)
MCHC RBC AUTO-ENTMCNC: 29.2 G/DL (ref 31.4–37.4)
MCV RBC AUTO: 86 FL (ref 82–98)
MICROALBUMIN UR-MCNC: 546.2 MG/L
MICROALBUMIN/CREAT 24H UR: 438 MG/G CREATININE (ref 0–30)
MONOCYTES # BLD AUTO: 0.58 THOUSAND/ΜL (ref 0.17–1.22)
MONOCYTES NFR BLD AUTO: 7 % (ref 4–12)
MUCOUS THREADS UR QL AUTO: ABNORMAL
NEUTROPHILS # BLD AUTO: 6.65 THOUSANDS/ΜL (ref 1.85–7.62)
NEUTS SEG NFR BLD AUTO: 79 % (ref 43–75)
NITRITE UR QL STRIP: NEGATIVE
NON-SQ EPI CELLS URNS QL MICRO: ABNORMAL /HPF
NRBC BLD AUTO-RTO: 0 /100 WBCS
PH UR STRIP.AUTO: 7.5 [PH]
PHOSPHATE SERPL-MCNC: 3.4 MG/DL (ref 2.3–4.1)
PLATELET # BLD AUTO: 202 THOUSANDS/UL (ref 149–390)
PMV BLD AUTO: 11 FL (ref 8.9–12.7)
POTASSIUM SERPL-SCNC: 5.4 MMOL/L (ref 3.5–5.3)
PROT UR STRIP-MCNC: ABNORMAL MG/DL
PTH-INTACT SERPL-MCNC: 111.3 PG/ML (ref 12–88)
RBC # BLD AUTO: 3.95 MILLION/UL (ref 3.88–5.62)
RBC #/AREA URNS AUTO: ABNORMAL /HPF
SODIUM SERPL-SCNC: 139 MMOL/L (ref 135–147)
SP GR UR STRIP.AUTO: 1.02 (ref 1–1.03)
URATE SERPL-MCNC: 6.6 MG/DL (ref 3.5–8.5)
UROBILINOGEN UR STRIP-ACNC: <2 MG/DL
WBC # BLD AUTO: 8.32 THOUSAND/UL (ref 4.31–10.16)
WBC #/AREA URNS AUTO: ABNORMAL /HPF

## 2024-09-16 PROCEDURE — 36415 COLL VENOUS BLD VENIPUNCTURE: CPT

## 2024-09-16 PROCEDURE — 80048 BASIC METABOLIC PNL TOTAL CA: CPT

## 2024-09-16 PROCEDURE — 81001 URINALYSIS AUTO W/SCOPE: CPT

## 2024-09-16 PROCEDURE — 83970 ASSAY OF PARATHORMONE: CPT

## 2024-09-16 PROCEDURE — 84100 ASSAY OF PHOSPHORUS: CPT

## 2024-09-16 PROCEDURE — 82306 VITAMIN D 25 HYDROXY: CPT

## 2024-09-16 PROCEDURE — 82570 ASSAY OF URINE CREATININE: CPT

## 2024-09-16 PROCEDURE — 84550 ASSAY OF BLOOD/URIC ACID: CPT

## 2024-09-16 PROCEDURE — 85025 COMPLETE CBC W/AUTO DIFF WBC: CPT

## 2024-09-16 PROCEDURE — 82043 UR ALBUMIN QUANTITATIVE: CPT

## 2024-09-16 PROCEDURE — 97597 DBRDMT OPN WND 1ST 20 CM/<: CPT | Performed by: ORTHOPAEDIC SURGERY

## 2024-09-16 RX ORDER — LIDOCAINE 40 MG/G
CREAM TOPICAL ONCE
Status: COMPLETED | OUTPATIENT
Start: 2024-09-16 | End: 2024-09-16

## 2024-09-16 RX ADMIN — LIDOCAINE: 40 CREAM TOPICAL at 10:52

## 2024-09-16 NOTE — PATIENT INSTRUCTIONS
Orders Placed This Encounter   Procedures    Wound Procedure Treatment Diabetic Ulcer Right;Plantar;Posterior Foot     This order was created via procedure documentation    Wound cleansing and dressings Diabetic Ulcer Right;Plantar;Posterior Foot     RIGHT FOOT WOUND    Wash your hands with soap and water. Remove old dressing, discard into plastic bag and place in trash. Cleanse the wound with Normal Saline prior to applying a clean dressing. Do not use tissue or cotton balls. Do not scrub the wound. Pat dry using gauze.     Shower: No     Apply Mepilex Up to the wound.  Cover with Gauze and ABD.   Secure with Julisa and Tape.   Change dressing every OTHER day.    Keep at weight off of right foot at all times    Please ask Dr. Heath if it is okay to take Saroj for extra protein intake.     Standing Status:   Future     Standing Expiration Date:   9/23/2024

## 2024-09-16 NOTE — PROGRESS NOTES
Patient ID: Augustus Coffman is a 68 y.o. male Date of Birth 1955       Chief Complaint   Patient presents with    Follow Up Wound Care Visit     RIGHT FOOT WOUND       Allergies:  Lisinopril    Diagnosis:   Diagnosis ICD-10-CM Associated Orders   1. Diabetic ulcer of other part of right foot associated with diabetes mellitus due to underlying condition, with fat layer exposed ([facility])  E08.621 lidocaine (LMX) 4 % cream    L97.512 Wound Procedure Treatment Diabetic Ulcer Right;Plantar;Posterior Foot     Wound cleansing and dressings Diabetic Ulcer Right;Plantar;Posterior Foot           Assessment and Plan :  Follow up evaluation of Singh Grade 2 Right diabetic foot ulcer significantly healing.   Debrided as below  Continue wound management with Mepilex Up. See wound orders below   No harsh cleansers such as alcohol, peroxide, or antibacterial soap, do not we nor submerge in water  Counseled on importance of frequent elevation of leg decreasing activity level for wound healing.  Continue proper offloading with off-loading with surgical shoe.   Followup with Podiatry in 1 week(s) or call sooner with questions or concerns or any signs of infection such as redness, swelling, increased/purulent drainage, fever, chills, increased severe pain.     Subjective:   9/16: Pt is a 68 y.o. patient of Dr. Luu with DMII who presents for f/u evaluation of DFU on R foot. Since last visit patient was diagnosed with acute cervical osteomyelitis, pericarditis and new onset Afib on Eliquis. Patient is on IV Cefazolin as per ID. Pt has been applying Mepilex up on wound bed. Denies fevers or chills.          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[facility])    History of hypertension    Hypercholesteremia    Hammer toe of right foot    Stasis dermatitis of both legs    Diabetic peripheral neuropathy ([facility])     Gout    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HCC)    Obesity with body mass index 30 or greater    Type 2 diabetes mellitus (HCC)    Retroperitoneal hematoma    Mesenteric lymphadenopathy    Acute blood loss anemia    FINA (acute kidney injury) on CKD stage 2(HCC)    GI bleed    Pleural effusion    Chronic venous hypertension (idiopathic) with ulcer of right lower extremity (HCC)    Rash    Stage 2 chronic kidney disease    Hypertensive kidney disease with stage 2 chronic kidney disease    Other proteinuria    Obesity, morbid (HCC)    Follicular lymphoma grade I of lymph nodes of multiple sites (HCC)    Vitamin D deficiency    Stage 3a chronic kidney disease (HCC)    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 (HCC)    Ectatic thoracic aorta (HCC)    Aortic stenosis with trileaflet valve    Sepsis without acute organ dysfunction (HCC)    Acute respiratory failure with hypoxia (HCC)    Acute hyponatremia    Chronic diastolic CHF (congestive heart failure) (HCC)    Encounter for deep vein thrombosis (DVT) prophylaxis    Hyponatremia    Neck pain    Acute osteomyelitis of cervical spine (HCC)    Transaminitis    Acute renal failure with oliguria  (HCC)    MSSA bacteremia    New onset a-fib (HCC)    Acute pericarditis    Diabetic ulcer of right foot (HCC)    Constipation    Pressure injury of deep tissue of buttock    Other specified anemias     Past Medical History:   Diagnosis Date    Arthritis 2010's    Occasionally flares up    Cancer (HCC) May 2021    Still investigating    Chronic kidney disease     Diabetes mellitus (HCC)     Follicular lymphoma (HCC)     GERD (gastroesophageal reflux disease) June 2021    Noticed in an Endoscopy    Heart murmur May 2020    High cholesterol     Hypertension     Kidney stone 1980's    Have had since 1980's    Obesity Since Childhood     Past Surgical History:   Procedure Laterality Date    BUNIONECTOMY Right 11/24/2020    Procedure: RIGHT HAV CORRECTION,;  Surgeon: Niranjan Child DPM;  Location: [facility]  MAIN [facility];  Service: Podiatry    COLONOSCOPY      INCISION AND DRAINAGE OF WOUND Right 10/05/2016    Procedure: INCISION AND DRAINAGE (I&D) EXTREMITY;  Surgeon: Niranjan Child DPM;  Location: [facility];  Service:     IR BIOPSY LYMPH NODE  07/08/2021    IR EMBOLIZATION (SPECIFY VESSEL OR SITE)  07/08/2021    IR PORT PLACEMENT  9/1/2022    IR TUNNELED CENTRAL LINE PLACEMENT  8/29/2024    PILONIDAL CYST EXCISION      MT CORRECTION HAMMERTOE Right 02/19/2019    Procedure: THIRD HAMMER TOE CORRECTION;  Surgeon: Niranjan Child DPM;  [facility];  Service: Podiatry    MT RMVL MATEO CTR VAD W/SUBQ PORT/ CTR/PRPH INSJ N/A 3/14/2023    Procedure: REMOVAL VENOUS PORT (PORT-A-CATH)IR;  Surgeon: Avelino Vázquez DO;  [facility];  Service: Interventional Radiology    TOE OSTEOTOMY Right 03/14/2017    Procedure: HAMMERTOE CORRECTION R 2 ;  Surgeon: Niranjan Child DPM;  [facility];  Service:     TOE OSTEOTOMY Left 11/24/2020    Procedure: LEFT HT CORRECTION TOE;  Surgeon: Niranjan Child DPM;  [facility];  Service: Podiatry    TONSILLECTOMY  1963     Family History   Problem Relation Age of Onset    Cancer Father         Leukemia     Social History     Socioeconomic History    Marital status: /Civil Union     Spouse name: None    Number of children: None    Years of education: None    Highest education level: None   Occupational History    None   Tobacco Use    Smoking status: Never    Smokeless tobacco: Never    Tobacco comments:     Never smoked but exposed to second hand smoke from birth until 1980's   Vaping Use    Vaping status: Never Used   Substance and Sexual Activity    Alcohol use: Never    Drug use: Never    Sexual activity: Not Currently     Partners: Female     Birth control/protection: Abstinence   Other Topics Concern    None   Social History Narrative    None     Social Determinants of Health     Financial Resource Strain: Not on file   Food Insecurity: No Food Insecurity  (9/3/2024)    Hunger Vital Sign     Worried About Running Out of Food in the Last Year: Never true     Ran Out of Food in the Last Year: Never true   Transportation Needs: No Transportation Needs (9/3/2024)    PRAPARE - Transportation     Lack of Transportation (Medical): No     Lack of Transportation (Non-Medical): No   Physical Activity: Not on file   Stress: Not on file   Social Connections: Unknown (6/18/2024)    Received from [facility]    Social Q-go     How often do you feel lonely or isolated from those around you? (Adult - for ages 18 years and over): Not on file   Intimate Partner Violence: Not on file   Housing Stability: Low Risk  (9/3/2024)    Housing Stability Vital Sign     Unable to Pay for Housing in the Last Year: No     Number of Times Moved in the Last Year: 0     Homeless in the Last Year: No       Current Outpatient Medications:     acetaminophen (TYLENOL) 325 mg tablet, Take 3 tablets (975 mg total) by mouth every 8 (eight) hours as needed for mild pain or moderate pain, Disp: , Rfl:     amiodarone 200 mg tablet, Take 1 tablet (200 mg total) by mouth daily, Disp: 30 tablet, Rfl: 0    apixaban (ELIQUIS) 5 mg, Take 1 tablet (5 mg total) by mouth 2 (two) times a day, Disp: 60 tablet, Rfl: 0    ceFAZolin (ANCEF) 2000 mg IVPB, Inject 2,000 mg into a catheter in a vein over 30 minutes at 100 mL/hr every 8 (eight) hours for 89 doses, Disp: , Rfl:     Cholecalciferol 100 MCG (4000 UT) CAPS, Take 1 capsule (4,000 Units total) by mouth in the morning, Disp: , Rfl:     metFORMIN (GLUCOPHAGE) 500 mg tablet, TAKE 2 TABLETS BY MOUTH TWICE A DAY WITH FOOD, Disp: 360 tablet, Rfl: 1    methocarbamol (ROBAXIN) 750 mg tablet, Take 1 tablet (750 mg total) by mouth every 6 (six) hours as needed for muscle spasms for up to 14 days, Disp: 56 tablet, Rfl: 0    metoprolol tartrate (LOPRESSOR) 25 mg tablet, Take 1 tablet (25 mg total) by mouth every 12 (twelve) hours, Disp: 60 tablet, Rfl: 0    Multiple  Vitamins-Minerals (Centrum Silver 50+Men) TABS, , Disp: , Rfl:     polyethylene glycol (MIRALAX) 17 g packet, Take 17 g by mouth daily, Disp: , Rfl:     rosuvastatin (CRESTOR) 40 MG tablet, Take 1 tablet (40 mg total) by mouth every evening, Disp: 90 tablet, Rfl: 1  No current facility-administered medications for this visit.    Review of Systems   Constitutional:  Negative for appetite change, chills, fatigue, fever and unexpected weight change.   HENT:  Negative for congestion, hearing loss and postnasal drip.    Respiratory:  Negative for cough and shortness of breath.    Cardiovascular:  Negative for leg swelling.   Musculoskeletal:  Positive for gait problem.   Skin:  Positive for wound (R foot). Negative for rash.   Neurological:  Negative for numbness.   Hematological:  Does not bruise/bleed easily.   Psychiatric/Behavioral: Negative.           Objective:  /79   Pulse 87   Temp (!) 97.1 °F (36.2 °C)   Resp 20   Pain Score: 0-No pain     Physical Exam  Vitals reviewed.   Constitutional:       General: He is not in acute distress.     Appearance: Normal appearance. He is well-developed. He is obese.   HENT:      Head: Normocephalic and atraumatic.   Cardiovascular:      Rate and Rhythm: Normal rate.   Pulmonary:      Effort: Pulmonary effort is normal.   Musculoskeletal:         General: No deformity.      Right lower leg: No edema.      Left lower leg: No edema.        Feet:    Feet:      Right foot:      Skin integrity: Callus present.      Comments: Pink granulated wound bed with periwound callus and moderate serosanguinous drainage.  Skin:     General: Skin is warm and dry.      Findings: Wound (R foot) present.   Neurological:      General: No focal deficit present.      Mental Status: He is alert and oriented to person, place, and time.      Gait: Gait abnormal.   Psychiatric:         Mood and Affect: Mood and affect normal.         Behavior: Behavior normal. Behavior is cooperative.  "        Wound 09/07/23 Diabetic Ulcer Foot Right;Plantar;Posterior (Active)   Enter Singh score: Singh Grade 1: Partial or full-thickness ulcer (superficial) 09/16/24 1049   Wound Image Images linked 09/16/24 1049   Wound Description Pink;Slough;Yellow 09/16/24 1049   Delisa-wound Assessment Dry;Fragile 09/16/24 1049   Wound Length (cm) 0.5 cm 09/16/24 1049   Wound Width (cm) 0.4 cm 09/16/24 1049   Wound Depth (cm) 0.1 cm 09/16/24 1049   Wound Surface Area (cm^2) 0.2 cm^2 09/16/24 1049   Wound Volume (cm^3) 0.02 cm^3 09/16/24 1049   Calculated Wound Volume (cm^3) 0.02 cm^3 09/16/24 1049   Change in Wound Size % 98 09/16/24 1049   Drainage Amount Small 09/16/24 1049   Drainage Description Serosanguineous 09/16/24 1049   Non-staged Wound Description Full thickness 09/16/24 1049   Dressing Status Intact 09/16/24 1049       Debridement   Wound 09/07/23 Diabetic Ulcer Foot Right;Plantar;Posterior    Universal Protocol:  procedure performed by consultantConsent: Verbal consent obtained. Written consent obtained.  Risks and benefits: risks, benefits and alternatives were discussed  Consent given by: patient  Time out: Immediately prior to procedure a \"time out\" was called to verify the correct patient, procedure, equipment, support staff and site/side marked as required.  Patient understanding: patient states understanding of the procedure being performed  Patient identity confirmed: verbally with patient    Debridement Details  Performed by: PA  Debridement type: selective  Pain control: lidocaine 4%      Post-debridement measurements  Length (cm): 0.5  Width (cm): 0.4  Depth (cm): 0.1  Percent debrided: 100%  Surface Area (cm^2): 0.2  Area Debrided (cm^2): 0.2  Volume (cm^3): 0.02    Devitalized tissue debrided: callus  Instrument(s) utilized: curette  Bleeding: small  Hemostasis obtained with: pressure  Procedural pain (0-10): 0  Post-procedural pain: 0   Response to treatment: procedure was tolerated well     " "    Results from last 6 Months   Lab Units 07/29/24  0846   WOUND CULTURE  3+ Growth of Staphylococcus aureus*         Wound Instructions:  Orders Placed This Encounter   Procedures    Wound Procedure Treatment Diabetic Ulcer Right;Plantar;Posterior Foot     This order was created via procedure documentation    Wound cleansing and dressings Diabetic Ulcer Right;Plantar;Posterior Foot     RIGHT FOOT WOUND    Wash your hands with soap and water. Remove old dressing, discard into plastic bag and place in trash. Cleanse the wound with Normal Saline prior to applying a clean dressing. Do not use tissue or cotton balls. Do not scrub the wound. Pat dry using gauze.     Shower: No     Apply Mepilex Up to the wound.  Cover with Gauze and ABD.   Secure with Julisa and Tape.   Change dressing every OTHER day.    Keep at weight off of right foot at all times    Please ask Dr. Heath if it is okay to take Saroj for extra protein intake.     Standing Status:   Future     Standing Expiration Date:   9/23/2024    Debridement     This order was created via procedure documentation       Teresa Palma PA-C, [facility]      Portions of the record may have been created with voice recognition software. Occasional wrong word or \"sound alike\" substitutions may have occurred due to the inherent limitations of voice recognition software. Read the chart carefully and recognize, using context, where substitutions have occurred.    "

## 2024-09-16 NOTE — TELEPHONE ENCOUNTER
Occupational Therapist reports pt weight is 278 and was 263.4 on 9/10. Pt was recently hospitalized, and pt directed to take weight same time in the morning, so they have true weight.     No increase SOB, no increase edema and VSS. BP:122/78, HR:86, O2 98%.    Pt was recently discharged from the hospital, D/C on 9/10/24.    PCP please call pt to make Phone: [phone].

## 2024-09-16 NOTE — PROGRESS NOTES
Wound Procedure Treatment Diabetic Ulcer Right;Plantar;Posterior Foot    Performed by: Maryana Bocanegra RN  Authorized by: Teresa Palma PA-C    Associated wounds:   Wound 09/07/23 Diabetic Ulcer Foot Right;Plantar;Posterior  Applied primary dressing:  Other  Applied secondary dressing:  ABD and Gauze  Dressing secured with:  Julisa and Tape  Offloading device appllied:  Surgical shoe    Mepilex UP

## 2024-09-17 NOTE — TELEPHONE ENCOUNTER
Is he having any swelling? CP? SOB? How has appetite been? Urinating and having Bms?    DO [facility]  9/17/2024 7:57 AM

## 2024-09-17 NOTE — TELEPHONE ENCOUNTER
Thank you, continue with his current management and monitor weight daily. Call us or cardiology with greater than 3 lbs increase over night.     DO [facility]  9/17/2024 9:44 AM

## 2024-09-17 NOTE — TELEPHONE ENCOUNTER
"Swelling: Slight Swelling in R Leg; Patient states this had been ongoing. He could not wear his compression sock because he has an ulcer on his foot. It is ALMOST healed, he will return to wearing his compression sock once completely healed.   CP: No Chest Pain  SOB: After climbing the stairs a few times, but nothing too concerning. This is his normal.  Appetite: \"Complicated.\" He stated that the medication he is taking has affected his taste buds and everything tastes bitter, metallic, plastic like. He does eat and drink a lot of liquids. He stated that when he was hospitalized, he did not eat for about 10 days. Post the hospital stay, he is able to eat now. He stated that they took him off of his water pill until next week when he sees his urologist.  Urination/BM's: Urinating often, BM's are daily now. Sometimes twice a day.     Please advise, thank you!  "

## 2024-09-18 ENCOUNTER — TRANSITIONAL CARE MANAGEMENT (OUTPATIENT)
Dept: FAMILY MEDICINE CLINIC | Facility: CLINIC | Age: 69
End: 2024-09-18

## 2024-09-18 ENCOUNTER — OFFICE VISIT (OUTPATIENT)
Dept: FAMILY MEDICINE CLINIC | Facility: CLINIC | Age: 69
End: 2024-09-18
Payer: MEDICARE

## 2024-09-18 VITALS
SYSTOLIC BLOOD PRESSURE: 112 MMHG | WEIGHT: 278 LBS | HEART RATE: 83 BPM | BODY MASS INDEX: 34.57 KG/M2 | HEIGHT: 75 IN | TEMPERATURE: 98.1 F | DIASTOLIC BLOOD PRESSURE: 62 MMHG | OXYGEN SATURATION: 97 %

## 2024-09-18 DIAGNOSIS — R22.1 NECK SWELLING: Primary | ICD-10-CM

## 2024-09-18 PROCEDURE — G2211 COMPLEX E/M VISIT ADD ON: HCPCS | Performed by: STUDENT IN AN ORGANIZED HEALTH CARE EDUCATION/TRAINING PROGRAM

## 2024-09-18 PROCEDURE — 99214 OFFICE O/P EST MOD 30 MIN: CPT | Performed by: STUDENT IN AN ORGANIZED HEALTH CARE EDUCATION/TRAINING PROGRAM

## 2024-09-18 NOTE — PROGRESS NOTES
Assessment/Plan:         Problem List Items Addressed This Visit    None  Visit Diagnoses       Neck swelling    -  Primary    Relevant Orders    US head neck soft tissue          Suspicious for this tot be a subcutaneous hematoma vs residual swelling from central line placement on the left side of the neck. Will get an US to better evaluate. Reviewed hospital admission notes and last pcp note    Subjective:      Patient ID: Augustus Coffman is a 68 y.o. male.    HPI    Patient ocming in for left neck swelling. Noticed it after swelling. Extensive medical history over the last few months including recent admission for discitis and now on IV antibiotivs. He is wearing a Cervical collar. Denies any pain, weakness is imprioving. Lesion on the neck is not painful or leaking any fluid     The following portions of the patient's history were reviewed and updated as appropriate:   Past Medical History:  He has a past medical history of Arthritis (2010's), Cancer (HCC) (May 2021), Chronic kidney disease, Diabetes mellitus (HCC), Follicular lymphoma (HCC), GERD (gastroesophageal reflux disease) (June 2021), Heart murmur (May 2020), High cholesterol, Hypertension, Kidney stone (1980's), and Obesity (Since Childhood).,  _______________________________________________________________________  Medical Problems:  does not have any pertinent problems on file.,  _______________________________________________________________________  Past Surgical History:   has a past surgical history that includes Toe Osteotomy (Right, 03/14/2017); Incision and drainage of wound (Right, 10/05/2016); PILONIDAL CYST EXCISION; pr correction hammertoe (Right, 02/19/2019); Toe Osteotomy (Left, 11/24/2020); Bunionectomy (Right, 11/24/2020); Tonsillectomy (1963); IR biopsy lymph node (07/08/2021); IR embolization (specify vessel or site) (07/08/2021); Colonoscopy; IR port placement (9/1/2022); pr rmvl jc ctr vad w/subq port/ ctr/prph insj (N/A,  3/14/2023); and IR tunneled central line placement (8/29/2024).,  _______________________________________________________________________  Family History:  family history includes Cancer in his father.,  _______________________________________________________________________  Social History:   reports that he has never smoked. He has never used smokeless tobacco. He reports that he does not drink alcohol and does not use drugs.,  _______________________________________________________________________  Allergies:  is allergic to lisinopril..  _______________________________________________________________________  Current Outpatient Medications   Medication Sig Dispense Refill   • acetaminophen (TYLENOL) 325 mg tablet Take 3 tablets (975 mg total) by mouth every 8 (eight) hours as needed for mild pain or moderate pain     • amiodarone 200 mg tablet Take 1 tablet (200 mg total) by mouth daily 30 tablet 0   • apixaban (ELIQUIS) 5 mg Take 1 tablet (5 mg total) by mouth 2 (two) times a day 60 tablet 0   • ceFAZolin (ANCEF) 2000 mg IVPB Inject 2,000 mg into a catheter in a vein over 30 minutes at 100 mL/hr every 8 (eight) hours for 89 doses     • Cholecalciferol 100 MCG (4000 UT) CAPS Take 1 capsule (4,000 Units total) by mouth in the morning     • metFORMIN (GLUCOPHAGE) 500 mg tablet TAKE 2 TABLETS BY MOUTH TWICE A DAY WITH FOOD 360 tablet 1   • methocarbamol (ROBAXIN) 750 mg tablet Take 1 tablet (750 mg total) by mouth every 6 (six) hours as needed for muscle spasms for up to 14 days 56 tablet 0   • metoprolol tartrate (LOPRESSOR) 25 mg tablet Take 1 tablet (25 mg total) by mouth every 12 (twelve) hours 60 tablet 0   • Multiple Vitamins-Minerals (Centrum Silver 50+Men) TABS      • polyethylene glycol (MIRALAX) 17 g packet Take 17 g by mouth daily     • rosuvastatin (CRESTOR) 40 MG tablet Take 1 tablet (40 mg total) by mouth every evening 90 tablet 1     No current facility-administered medications for this visit.  "    _______________________________________________________________________  Review of Systems   Constitutional:  Negative for activity change, appetite change, chills, fatigue and fever.   HENT:  Negative for congestion, rhinorrhea and sore throat.    Eyes:  Negative for visual disturbance.   Respiratory:  Negative for cough and shortness of breath.    Cardiovascular:  Negative for chest pain.   Gastrointestinal:  Negative for nausea and vomiting.         Objective:  Vitals:    09/18/24 1429   BP: 112/62   Pulse: 83   Temp: 98.1 °F (36.7 °C)   SpO2: 97%   Weight: 126 kg (278 lb)   Height: 6' 3\" (1.905 m)     Body mass index is 34.75 kg/m².     Physical Exam  Constitutional:       General: He is not in acute distress.     Appearance: Normal appearance.   HENT:      Head: Normocephalic and atraumatic.   Neck:     Pulmonary:      Effort: Pulmonary effort is normal. No respiratory distress.   Neurological:      Mental Status: He is alert and oriented to person, place, and time. Mental status is at baseline.   Psychiatric:         Mood and Affect: Mood normal.         Behavior: Behavior normal.       "

## 2024-09-19 ENCOUNTER — OFFICE VISIT (OUTPATIENT)
Dept: INFECTIOUS DISEASES | Facility: CLINIC | Age: 69
End: 2024-09-19
Payer: MEDICARE

## 2024-09-19 ENCOUNTER — PREP FOR PROCEDURE (OUTPATIENT)
Dept: INTERVENTIONAL RADIOLOGY/VASCULAR | Facility: CLINIC | Age: 69
End: 2024-09-19

## 2024-09-19 VITALS
BODY MASS INDEX: 34.32 KG/M2 | WEIGHT: 276 LBS | SYSTOLIC BLOOD PRESSURE: 105 MMHG | TEMPERATURE: 97.4 F | HEIGHT: 75 IN | HEART RATE: 74 BPM | RESPIRATION RATE: 17 BRPM | OXYGEN SATURATION: 94 % | DIASTOLIC BLOOD PRESSURE: 70 MMHG

## 2024-09-19 DIAGNOSIS — B95.61 MSSA BACTEREMIA: ICD-10-CM

## 2024-09-19 DIAGNOSIS — R78.81 MSSA BACTEREMIA: ICD-10-CM

## 2024-09-19 DIAGNOSIS — E11.22 TYPE 2 DIABETES MELLITUS WITH STAGE 3 CHRONIC KIDNEY DISEASE, WITHOUT LONG-TERM CURRENT USE OF INSULIN, UNSPECIFIED WHETHER STAGE 3A OR 3B CKD (HCC): ICD-10-CM

## 2024-09-19 DIAGNOSIS — M46.22 ACUTE OSTEOMYELITIS OF CERVICAL SPINE (HCC): Primary | ICD-10-CM

## 2024-09-19 DIAGNOSIS — M86.9 OSTEOMYELITIS, UNSPECIFIED SITE, UNSPECIFIED TYPE (HCC): Primary | ICD-10-CM

## 2024-09-19 DIAGNOSIS — Z95.828 STATUS POST PERIPHERALLY INSERTED CENTRAL CATHETER (PICC) CENTRAL LINE PLACEMENT: ICD-10-CM

## 2024-09-19 DIAGNOSIS — I30.9 ACUTE PERICARDITIS, UNSPECIFIED TYPE: ICD-10-CM

## 2024-09-19 DIAGNOSIS — Z79.2 LONG TERM (CURRENT) USE OF ANTIBIOTICS: ICD-10-CM

## 2024-09-19 DIAGNOSIS — N18.30 TYPE 2 DIABETES MELLITUS WITH STAGE 3 CHRONIC KIDNEY DISEASE, WITHOUT LONG-TERM CURRENT USE OF INSULIN, UNSPECIFIED WHETHER STAGE 3A OR 3B CKD (HCC): ICD-10-CM

## 2024-09-19 PROCEDURE — 99215 OFFICE O/P EST HI 40 MIN: CPT | Performed by: PHYSICIAN ASSISTANT

## 2024-09-19 RX ORDER — CEFADROXIL 500 MG/1
500 CAPSULE ORAL EVERY 12 HOURS SCHEDULED
Qty: 60 CAPSULE | Refills: 0 | Status: SHIPPED | OUTPATIENT
Start: 2024-10-03 | End: 2024-11-02

## 2024-09-19 NOTE — PATIENT INSTRUCTIONS
Continue on IV cefazolin through 10/2/2024 to complete 6 weeks of treatment  Continue weekly labs including creatinine, ESR, CRP, and CBCd  Transition to PO Duricef 500mg every 12 hours on 10/3/2024  IR referral placed for tunneled line removal on 10/3  Schedule repeat MRI to be done in the next 2-3 weeks

## 2024-09-23 ENCOUNTER — HOSPITAL ENCOUNTER (OUTPATIENT)
Dept: RADIOLOGY | Facility: HOSPITAL | Age: 69
Discharge: HOME | End: 2024-09-23
Payer: MEDICARE

## 2024-09-23 ENCOUNTER — OFFICE VISIT (OUTPATIENT)
Dept: NEPHROLOGY | Facility: CLINIC | Age: 69
End: 2024-09-23
Payer: MEDICARE

## 2024-09-23 VITALS
TEMPERATURE: 96.9 F | BODY MASS INDEX: 35.19 KG/M2 | WEIGHT: 283 LBS | HEIGHT: 75 IN | SYSTOLIC BLOOD PRESSURE: 124 MMHG | OXYGEN SATURATION: 96 % | HEART RATE: 78 BPM | DIASTOLIC BLOOD PRESSURE: 80 MMHG | RESPIRATION RATE: 16 BRPM

## 2024-09-23 DIAGNOSIS — N18.31 STAGE 3A CHRONIC KIDNEY DISEASE (HCC): Primary | ICD-10-CM

## 2024-09-23 DIAGNOSIS — R80.8 OTHER PROTEINURIA: ICD-10-CM

## 2024-09-23 DIAGNOSIS — N18.30 HYPERTENSIVE KIDNEY DISEASE WITH STAGE 3 CHRONIC KIDNEY DISEASE, UNSPECIFIED WHETHER STAGE 3A OR 3B CKD (HCC): ICD-10-CM

## 2024-09-23 DIAGNOSIS — M46.22 ACUTE OSTEOMYELITIS OF CERVICAL SPINE (HCC): ICD-10-CM

## 2024-09-23 DIAGNOSIS — E55.9 VITAMIN D DEFICIENCY: ICD-10-CM

## 2024-09-23 DIAGNOSIS — I12.9 HYPERTENSIVE KIDNEY DISEASE WITH STAGE 3 CHRONIC KIDNEY DISEASE, UNSPECIFIED WHETHER STAGE 3A OR 3B CKD (HCC): ICD-10-CM

## 2024-09-23 PROCEDURE — 72040 X-RAY EXAM NECK SPINE 2-3 VW: CPT

## 2024-09-23 PROCEDURE — G2211 COMPLEX E/M VISIT ADD ON: HCPCS | Performed by: INTERNAL MEDICINE

## 2024-09-23 PROCEDURE — 99214 OFFICE O/P EST MOD 30 MIN: CPT | Performed by: INTERNAL MEDICINE

## 2024-09-23 NOTE — PROGRESS NOTES
NEPHROLOGY OFFICE FOLLOW UP  Augustus Coffman 68 y.o.male  MRN: [id number]    Encounter: [id number] DATE: 9/23/2024    REASON FOR VISIT: Augustus Coffman is a 68 y.o. male returns to the nephrology office for further management of chronic kidney disease.    HPI:    This is 68-year-old male with past medical history significant for hypertension, diabetes type 2, hyperlipidemia, returns to the Nephrology office for further management of CKD stage 2-3.    Upon review of old medical records, new baseline creatinine is thought to be around 1.0-1.2.    Patient was recently admitted to [facility] where he was treated with sepsis.  Patient was also found to have FINA and during the hospital stay patient renal function has improved.  Patient is no longer taking Jardiance and losartan since recent hospital discharge.  According to patient he is currently using IV antibiotic for management of sepsis.    Patient was earlier found to have mesenteric and retroperitoneal lymphadenopathy.  Patient had underwent lymph node biopsy in the past and was diagnosed with follicular lymphoma.  Patient has received chemotherapy in the past and according to patient lymphoma is currently in remission.    Patient has underlying hypertension and according to patient blood pressures under well control with current antihypertensive medications    Patient has diabetes type 2 and currently taking metformin.    Patient also have underlying vitamin D deficiency and currently taking cholecalciferol.    Patient takes all the medications as prescribed and denies use of NSAIDs .        REVIEW OF SYSTEMS:    Review of Systems   Constitutional:  Negative for chills and fever.   HENT:  Negative for nosebleeds.    Eyes:  Negative for photophobia and pain.   Respiratory:  Negative for choking.    Cardiovascular:  Negative for palpitations.   Gastrointestinal:  Negative for blood in stool.   Genitourinary:  Negative for hematuria.    Musculoskeletal:  Negative for neck stiffness.   Neurological:  Negative for seizures.   Psychiatric/Behavioral:  Negative for confusion and suicidal ideas.          PAST MEDICAL HISTORY:  Past Medical History:   Diagnosis Date    Arthritis 2010's    Occasionally flares up    Cancer (HCC) May 2021    Still investigating    Chronic kidney disease     Diabetes mellitus (HCC)     Follicular lymphoma (HCC)     GERD (gastroesophageal reflux disease) June 2021    Noticed in an Endoscopy    Heart murmur May 2020    High cholesterol     Hypertension     Kidney stone 1980's    Have had since 1980's    Obesity Since Childhood       PAST SURGICAL HISTORY:  Past Surgical History:   Procedure Laterality Date    BUNIONECTOMY Right 11/24/2020    Procedure: RIGHT HAV CORRECTION,;  Surgeon: Niranjan Child DPM;  [facility];  Service: Podiatry    COLONOSCOPY      INCISION AND DRAINAGE OF WOUND Right 10/05/2016    Procedure: INCISION AND DRAINAGE (I&D) EXTREMITY;  Surgeon: Niranjan Child DPM;  Location[facility];  Service:     IR BIOPSY LYMPH NODE  07/08/2021    IR EMBOLIZATION (SPECIFY VESSEL OR SITE)  07/08/2021    IR PORT PLACEMENT  9/1/2022    IR TUNNELED CENTRAL LINE PLACEMENT  8/29/2024    PILONIDAL CYST EXCISION      IA CORRECTION HAMMERTOE Right 02/19/2019    Procedure: THIRD HAMMER TOE CORRECTION;  Surgeon: Niranjan Child DPM;  [facility];  Service: Podiatry    IA RMVL MATEO CTR VAD W/SUBQ PORT/ CTR/PRPH INSJ N/A 3/14/2023    Procedure: REMOVAL VENOUS PORT (PORT-A-CATH)IR;  Surgeon: Avelino Vázquez DO;  [facility];  Service: Interventional Radiology    TOE OSTEOTOMY Right 03/14/2017    Procedure: HAMMERTOE CORRECTION R 2 ;  Surgeon: Niranjan Child DPM;  [facility];  Service:     TOE OSTEOTOMY Left 11/24/2020    Procedure: LEFT HT CORRECTION TOE;  Surgeon: Niranjan Child DPM;  [facility];  Service: Podiatry    TONSILLECTOMY  1963       SOCIAL HISTORY:  Social History      Substance and Sexual Activity   Alcohol Use Never     Social History     Substance and Sexual Activity   Drug Use Never     Social History     Tobacco Use   Smoking Status Never   Smokeless Tobacco Never   Tobacco Comments    Never smoked but exposed to second hand smoke from birth until 1980's       FAMILY HISTORY:  Family History   Problem Relation Age of Onset    Cancer Father         Leukemia       ALLERGY:  Allergies   Allergen Reactions    Lisinopril Cough       MEDICATIONS:    Current Outpatient Medications:     acetaminophen (TYLENOL) 325 mg tablet, Take 3 tablets (975 mg total) by mouth every 8 (eight) hours as needed for mild pain or moderate pain, Disp: , Rfl:     amiodarone 200 mg tablet, Take 1 tablet (200 mg total) by mouth daily, Disp: 30 tablet, Rfl: 0    apixaban (ELIQUIS) 5 mg, Take 1 tablet (5 mg total) by mouth 2 (two) times a day, Disp: 60 tablet, Rfl: 0    [START ON 10/3/2024] cefadroxil (DURICEF) 500 mg capsule, Take 1 capsule (500 mg total) by mouth every 12 (twelve) hours Do not start before October 3, 2024., Disp: 60 capsule, Rfl: 0    ceFAZolin (ANCEF) 2000 mg IVPB, Inject 2,000 mg into a catheter in a vein over 30 minutes at 100 mL/hr every 8 (eight) hours for 89 doses, Disp: , Rfl:     Cholecalciferol 100 MCG (4000 UT) CAPS, Take 1.25 capsules (5,000 Units total) by mouth in the morning, Disp: , Rfl:     metFORMIN (GLUCOPHAGE) 500 mg tablet, TAKE 2 TABLETS BY MOUTH TWICE A DAY WITH FOOD, Disp: 360 tablet, Rfl: 1    methocarbamol (ROBAXIN) 750 mg tablet, Take 1 tablet (750 mg total) by mouth every 6 (six) hours as needed for muscle spasms for up to 14 days, Disp: 56 tablet, Rfl: 0    metoprolol tartrate (LOPRESSOR) 25 mg tablet, Take 1 tablet (25 mg total) by mouth every 12 (twelve) hours, Disp: 60 tablet, Rfl: 0    Multiple Vitamins-Minerals (Centrum Silver 50+Men) TABS, , Disp: , Rfl:     polyethylene glycol (MIRALAX) 17 g packet, Take 17 g by mouth daily (Patient taking  "differently: Take 17 g by mouth if needed), Disp: , Rfl:     rosuvastatin (CRESTOR) 40 MG tablet, Take 1 tablet (40 mg total) by mouth every evening, Disp: 90 tablet, Rfl: 1    PHYSICAL EXAM:  Vitals:    09/23/24 1234   BP: 124/80   Pulse: 78   Resp: 16   Temp: (!) 96.9 °F (36.1 °C)   TempSrc: Temporal   SpO2: 96%   Weight: 128 kg (283 lb)   Height: 6' 3\" (1.905 m)     Body mass index is 35.37 kg/m².    Physical Exam  Constitutional:       General: He is not in acute distress.  HENT:      Right Ear: External ear normal.   Eyes:      Conjunctiva/sclera:      Right eye: No hemorrhage.  Neck:      Thyroid: No thyromegaly.   Cardiovascular:      Heart sounds: Murmur heard.   Pulmonary:      Effort: No accessory muscle usage or respiratory distress.   Abdominal:      General: There is no distension.   Musculoskeletal:         General: Swelling present.   Skin:     Coloration: Skin is not jaundiced.   Psychiatric:         Behavior: Behavior is not combative.         LAB RESULTS:  Results for orders placed or performed in visit on 09/16/24   Albumin / creatinine urine ratio   Result Value Ref Range    Creatinine, Ur 124.8 Reference range not established. mg/dL    Albumin,U,Random 546.2 (H) <20.0 mg/L    Albumin Creat Ratio 438 (H) 0 - 30 mg/g creatinine   Basic metabolic panel   Result Value Ref Range    Sodium 139 135 - 147 mmol/L    Potassium 5.4 (H) 3.5 - 5.3 mmol/L    Chloride 104 96 - 108 mmol/L    CO2 28 21 - 32 mmol/L    ANION GAP 7 4 - 13 mmol/L    BUN 22 5 - 25 mg/dL    Creatinine 1.28 0.60 - 1.30 mg/dL    Glucose, Fasting 115 (H) 65 - 99 mg/dL    Calcium 9.3 8.4 - 10.2 mg/dL    eGFR 57 ml/min/1.73sq m   CBC and differential   Result Value Ref Range    WBC 8.32 4.31 - 10.16 Thousand/uL    RBC 3.95 3.88 - 5.62 Million/uL    Hemoglobin 9.9 (L) 12.0 - 17.0 g/dL    Hematocrit 33.9 (L) 36.5 - 49.3 %    MCV 86 82 - 98 fL    MCH 25.1 (L) 26.8 - 34.3 pg    MCHC 29.2 (L) 31.4 - 37.4 g/dL    RDW 20.8 (H) 11.6 - 15.1 %    " MPV 11.0 8.9 - 12.7 fL    Platelets 202 149 - 390 Thousands/uL    nRBC 0 /100 WBCs    Segmented % 79 (H) 43 - 75 %    Immature Grans % 1 0 - 2 %    Lymphocytes % 9 (L) 14 - 44 %    Monocytes % 7 4 - 12 %    Eosinophils Relative 3 0 - 6 %    Basophils Relative 1 0 - 1 %    Absolute Neutrophils 6.65 1.85 - 7.62 Thousands/µL    Absolute Immature Grans 0.04 0.00 - 0.20 Thousand/uL    Absolute Lymphocytes 0.71 0.60 - 4.47 Thousands/µL    Absolute Monocytes 0.58 0.17 - 1.22 Thousand/µL    Eosinophils Absolute 0.24 0.00 - 0.61 Thousand/µL    Basophils Absolute 0.10 0.00 - 0.10 Thousands/µL   Phosphorus   Result Value Ref Range    Phosphorus 3.4 2.3 - 4.1 mg/dL   PTH, intact   Result Value Ref Range    .3 (H) 12.0 - 88.0 pg/mL   Urinalysis with microscopic   Result Value Ref Range    Color, UA Yellow     Clarity, UA Clear     Specific Gravity, UA 1.023 1.003 - 1.030    pH, UA 7.5 4.5, 5.0, 5.5, 6.0, 6.5, 7.0, 7.5, 8.0    Leukocytes, UA Negative Negative    Nitrite, UA Negative Negative    Protein,  (2+) (A) Negative mg/dl    Glucose, UA Negative Negative mg/dl    Ketones, UA Negative Negative mg/dl    Urobilinogen, UA <2.0 <2.0 mg/dl mg/dl    Bilirubin, UA Negative Negative    Occult Blood, UA Negative Negative    RBC, UA None Seen None Seen, 1-2 /hpf    WBC, UA 1-2 None Seen, 1-2 /hpf    Epithelial Cells Occasional None Seen, Occasional /hpf    Bacteria, UA None Seen None Seen, Occasional /hpf    MUCUS THREADS Occasional (A) None Seen    Hyaline Casts, UA 0-3 (A) None Seen /lpf   Uric acid   Result Value Ref Range    Uric Acid 6.6 3.5 - 8.5 mg/dL   Vitamin D 25 hydroxy   Result Value Ref Range    Vit D, 25-Hydroxy 62.1 30.0 - 100.0 ng/mL       ASSESSMENT and PLAN:  Wilmer was seen today for chronic kidney disease and follow-up.    Diagnoses and all orders for this visit:    Stage 3a chronic kidney disease (HCC)  -     CBC and differential; Future  -     Basic metabolic panel; Future  -     Urinalysis with  microscopic; Future  -     Albumin / creatinine urine ratio; Future  -     Phosphorus; Future  -     Uric acid; Future  -     PTH, intact; Future  -     Vitamin D 25 hydroxy; Future    Hypertensive kidney disease with stage 3 chronic kidney disease, unspecified whether stage 3a or 3b CKD ([facility])  -     Basic metabolic panel; Future  -     Urinalysis with microscopic; Future  -     Albumin / creatinine urine ratio; Future    Other proteinuria  -     Basic metabolic panel; Future  -     Urinalysis with microscopic; Future  -     Albumin / creatinine urine ratio; Future    Vitamin D deficiency  -     Cholecalciferol 100 MCG (4000 UT) CAPS; Take 1.25 capsules (5,000 Units total) by mouth in the morning  -     Vitamin D 25 hydroxy; Future      1. CKD stage 2-3.  Multifactorial, suspected due to underlying diabetic nephropathy on top of hypertensive nephrosclerosis with proteinuria.    Upon review of old medical records from [facility] chart, new baseline creatinine is thought to be around 1.0-1.2 with current creatinine of 1.28 with EGFR of 57.    Patient has underlying leg swelling and was previously taking Lasix which is currently on hold.  Recently patient was admitted to [facility] where he was found to have FINA due to ATN from sepsis.  Peak creatinine level was 3.38 [8/24/2024] and during the hospital stay renal function has improved.  Patient's current creatinine is 1.28 with EGFR of 57.    Recent BMP showed potassium of 5.4 and advised patient to follow low potassium diet.  According to patient he is currently receiving antibiotic for management of sepsis.    Management of diabetes and hypertension as mentioned below.    Plan to avoid NSAIDs and recheck renal function before next visit.    2.  Hypertension in chronic kidney disease .  Today's blood pressure is under well control and plan to monitor hypertension with metoprolol 25 mg p.o. twice daily.  During recent hospital stay, patient was found to have  "FINA and no longer taking losartan.  Encourage patient to follow low-salt diet.    3. Diabetes type 2 in chronic kidney disease.  Goal Hb A1c would be < 7% for underlying chronic kidney disease.  Patient is currently on metformin and defer further management of diabetes to PCP.    4. Proteinuria.  Multifactorial and suspected due to underlying diabetic nephropathy on top of hypertensive nephrosclerosis.  SPEP and UPEP done on 1/31/2023 showed no M spike.    Patient's current urine microalbumin to creatinine ratio is 438 mg.  During recent hospital stay, patient was found to have FINA and no longer taking Jardiance and losartan.  For time being monitor proteinuria without losartan and Jardiance but in the future depending on renal function and proteinuria trend, may consider restarting those medications.    5.  Vitamin D deficiency.  Patient is currently taking cholecalciferol 8000 units p.o. daily and in the past patient was found to have hypercalcemia and dose was reduced to 4000 units p.o. daily.  Current vitamin D level is 62 and calcium level is at goal.  Advised patient to decrease cholecalciferol to 5000 units p.o. daily.  Recheck vitamin D level with the next visit.    6.  Secondary hyperparathyroidism of renal origin.  Current PTH level is 111 which is above the goal but for time being monitor PTH level without initiation of calcitriol.    Returns to the nephrology office in 6 months .  Future labs ordered        Portions of the record may have been created with voice recognition software. Occasional wrong word or \"sound a like\" substitutions may have occurred due to the inherent limitations of voice recognition software. Read the chart carefully and recognize, using context, where substitutions have occurred.If you have any questions, please contact the dictating provider.   "

## 2024-09-26 ENCOUNTER — OFFICE VISIT (OUTPATIENT)
Dept: NEUROSURGERY | Facility: CLINIC | Age: 69
End: 2024-09-26
Payer: MEDICARE

## 2024-09-26 ENCOUNTER — OFFICE VISIT (OUTPATIENT)
Dept: WOUND CARE | Facility: CLINIC | Age: 69
End: 2024-09-26
Payer: MEDICARE

## 2024-09-26 VITALS
HEART RATE: 75 BPM | HEIGHT: 75 IN | TEMPERATURE: 98.2 F | SYSTOLIC BLOOD PRESSURE: 144 MMHG | WEIGHT: 283 LBS | RESPIRATION RATE: 17 BRPM | OXYGEN SATURATION: 97 % | DIASTOLIC BLOOD PRESSURE: 86 MMHG | BODY MASS INDEX: 35.19 KG/M2

## 2024-09-26 VITALS
DIASTOLIC BLOOD PRESSURE: 74 MMHG | HEART RATE: 81 BPM | TEMPERATURE: 97.6 F | RESPIRATION RATE: 18 BRPM | SYSTOLIC BLOOD PRESSURE: 140 MMHG

## 2024-09-26 DIAGNOSIS — E08.621 DIABETIC ULCER OF OTHER PART OF RIGHT FOOT ASSOCIATED WITH DIABETES MELLITUS DUE TO UNDERLYING CONDITION, WITH FAT LAYER EXPOSED (HCC): Primary | ICD-10-CM

## 2024-09-26 DIAGNOSIS — M46.22 OSTEOMYELITIS OF CERVICAL VERTEBRA (HCC): Primary | ICD-10-CM

## 2024-09-26 DIAGNOSIS — M46.22 ACUTE OSTEOMYELITIS OF CERVICAL SPINE (HCC): ICD-10-CM

## 2024-09-26 DIAGNOSIS — L97.512 DIABETIC ULCER OF OTHER PART OF RIGHT FOOT ASSOCIATED WITH DIABETES MELLITUS DUE TO UNDERLYING CONDITION, WITH FAT LAYER EXPOSED (HCC): Primary | ICD-10-CM

## 2024-09-26 PROCEDURE — 11042 DBRDMT SUBQ TIS 1ST 20SQCM/<: CPT | Performed by: PODIATRIST

## 2024-09-26 PROCEDURE — 99214 OFFICE O/P EST MOD 30 MIN: CPT | Performed by: NURSE PRACTITIONER

## 2024-09-26 RX ORDER — LIDOCAINE 40 MG/G
CREAM TOPICAL ONCE
Status: COMPLETED | OUTPATIENT
Start: 2024-09-26 | End: 2024-09-26

## 2024-09-26 RX ADMIN — LIDOCAINE: 40 CREAM TOPICAL at 15:50

## 2024-09-26 NOTE — ASSESSMENT & PLAN NOTE
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  Hx MSSA bacteremia diagnosed 7/28 with 7 days of antibiotics.  Re-presented to hospital for treatment of above following results of outpatient MRI.  Continues on IV cefazolin through 10/2, then will transition to oral duricef per ID.  Oral durcef to continue until ESR/CRP stabilize.  ESR on 9/9 130  CRP on 9/24 12.8  Maintained in [place] vista cervical brace.  Endorses neck pain, worse with car ride and movement. Does not radiate into arms. No numbness/weakness.    Imaging:  Cervical x-rays, 9/23/2024: Redemonstration of destructive spondyloarthropathy at C5-6 compatible with patient's known discitis osteomyelitis at this level. Prevertebral soft tissue prominence.    Plan:  Reviewed x-ray imaging extensively with patient.   Discussed that he should continue antibiotic therapy per ID.  Adjusted vista brace to increase height.  Reviewed that patient unfortunately appears to have developed significant cervical kyphosis at level of discitis/osteomyelitis.  Fortunately he is not experiencing any concerning symptoms however we discussed that he may require surgical deformity correction at a later date.  This would only be considered once he has completed antibiotic therapy and infection has completely resolved.  He is not keen to proceed with any surgery however understands that it may become necessary.  Follow up in 4 weeks with repeat x-rays. He has an MRI of cervical spine scheduled on 10/11 that we can review as well.

## 2024-09-26 NOTE — PATIENT INSTRUCTIONS
Orders Placed This Encounter   Procedures    Wound cleansing and dressings     RIGHT FOOT WOUND     Wash your hands with soap and water. Remove old dressing, discard into plastic bag and place in trash. Cleanse the wound with Normal Saline prior to applying a clean dressing. Do not use tissue or cotton balls. Do not scrub the wound. Pat dry using gauze.      Shower: No      Apply Mepilex Up to the wound.  Cover with Gauze and ABD.   Secure with Julisa and Tape.   Change dressing every OTHER day.     Keep at weight off of right foot at all times     Please ask Dr. Heath if it is okay to take Saroj for extra protein intake.     Standing Status:   Future     Standing Expiration Date:   10/3/2024    Wound Procedure Treatment Diabetic Ulcer Right;Plantar;Posterior Foot     This order was created via procedure documentation

## 2024-09-26 NOTE — PROGRESS NOTES
Ambulatory Visit  Name: Augustus Coffman      : 1955      MRN: [id number]  Encounter Provider: TANVIR Doss  Encounter Date: 2024   Encounter department: [facility] NEUROSURGICAL [facility]    Assessment & Plan  Osteomyelitis of cervical vertebra (HCC)    Orders:    XR spine cervical 2 or 3 vw injury; Future    Acute osteomyelitis of cervical spine (HCC)  Presents for follow up for C5-6 OM/discitis  Discovered on outpatient imaging  in setting of several weeks of neck pain.  Hx MSSA bacteremia diagnosed  with 7 days of antibiotics.  Re-presented to hospital for treatment of above following results of outpatient MRI.  Continues on IV cefazolin through 10/2, then will transition to oral duricef per ID.  Oral durcef to continue until ESR/CRP stabilize.  ESR on  130  CRP on  12.8  Maintained in [place] vista cervical brace.  Endorses neck pain, worse with car ride and movement. Does not radiate into arms. No numbness/weakness.    Imaging:  Cervical x-rays, 2024: Redemonstration of destructive spondyloarthropathy at C5-6 compatible with patient's known discitis osteomyelitis at this level. Prevertebral soft tissue prominence.    Plan:  Reviewed x-ray imaging extensively with patient.   Discussed that he should continue antibiotic therapy per ID.  Adjusted vista brace to increase height.  Reviewed that patient unfortunately appears to have developed significant cervical kyphosis at level of discitis/osteomyelitis.  Fortunately he is not experiencing any concerning symptoms however we discussed that he may require surgical deformity correction at a later date.  This would only be considered once he has completed antibiotic therapy and infection has completely resolved.  He is not keen to proceed with any surgery however understands that it may become necessary.  Follow up in 4 weeks with repeat x-rays. He has an MRI of cervical spine scheduled on 10/11 that we can review as  well.           History of Present Illness   With past medical history of type 1.5 diabetes, CHF, A-fib, aortic stenosis, stage III chronic kidney disease, who originally presented to the hospital at [facility] on 7/23 with complaint of neck pain healing right foot ulcer.  He was diagnosed with MSSA bacteremia and placed on antibiotic therapy for 7 days.  His neck continues to hurt him significantly and he ultimately was evaluated by an orthopedic doctor outpatient.  He had MRI of his cervical spine completed 8/23 which indicated C5-6 acute osteomyelitis/discitis with prevertebral abscess.  He was advised to present immediately to the emergency department.  On arrival he was transferred to [facility] for further management.  He was started on IV cefazolin which he continues today.    During hospitalization he was placed in a cervical brace which she continues today.  He continues to endorse neck pain and states it is worse when he is driving and going over bumps.  He has been taking over-the-counter medications for pain.  He continues to wear cervical brace as instructed.  He was evaluated by ID last week.  He was discharged from rehab on 9/9 and has been home since.  He denies any numbness or tingling.  He denies any issues with use of his arms.  He has become concerned that he seems to be leaning forwards excessively.      Review of Systems   HENT:  Negative for trouble swallowing.    Gastrointestinal:  Positive for nausea.   Genitourinary:  Negative for enuresis.   Musculoskeletal:  Positive for neck stiffness. Negative for gait problem (slow to walk), myalgias and neck pain.   Neurological:  Negative for weakness and numbness.   Hematological:  Bruises/bleeds easily ([id number]).   Psychiatric/Behavioral:  Positive for sleep disturbance.    All other systems reviewed and are negative.    I have personally reviewed the MA's review of systems and made changes as necessary.    Pertinent Medical  "History         Objective     /86 (BP Location: Left arm, Patient Position: Sitting, Cuff Size: Standard)   Pulse 75   Temp 98.2 °F (36.8 °C) (Temporal)   Resp 17   Ht 6' 3\" (1.905 m)   Wt 128 kg (283 lb)   SpO2 97%   BMI 35.37 kg/m²     Physical Exam  Constitutional:       General: He is not in acute distress.     Appearance: He is well-developed. He is obese. He is not diaphoretic.      Comments: [place] vista brace   Eyes:      General:         Right eye: No discharge.         Left eye: No discharge.      Extraocular Movements: EOM normal.      Conjunctiva/sclera: Conjunctivae normal.      Pupils: Pupils are equal, round, and reactive to light.   Pulmonary:      Effort: Pulmonary effort is normal. No respiratory distress.   Abdominal:      General: There is no distension.      Tenderness: There is no abdominal tenderness.   Musculoskeletal:         General: Normal range of motion.      Cervical back: Normal range of motion.   Skin:     General: Skin is warm and dry.   Neurological:      Mental Status: He is alert and oriented to person, place, and time.      Cranial Nerves: No cranial nerve deficit.      Motor: Motor strength is normal.     Coordination: Finger-Nose-Finger Test normal.      Gait: Gait is intact.      Deep Tendon Reflexes: Reflexes normal.   Psychiatric:         Speech: Speech normal.         Behavior: Behavior normal.         Thought Content: Thought content normal.         Judgment: Judgment normal.       Neurologic Exam     Mental Status   Oriented to person, place, and time.   Oriented to person.   Oriented to place.   Oriented to time.   Registration: recalls 3 of 3 objects. Follows 1 step commands.   Attention: normal. Concentration: normal.   Speech: speech is normal   Level of consciousness: alert  Knowledge: good and consistent with education. Able to perform simple calculations.   Able to name object.     Cranial Nerves     CN II   Visual acuity: normal  Right visual field " deficit: none  Left visual field deficit: none     CN III, IV, VI   Pupils are equal, round, and reactive to light.  Extraocular motions are normal.   Right pupil: Size: 3 mm. Shape: regular. Reactivity: brisk. Consensual response: intact. Accommodation: intact.   Left pupil: Size: 3 mm. Shape: regular. Reactivity: brisk. Consensual response: intact. Accommodation: intact.   CN III: no CN III palsy  CN VI: no CN VI palsy  Nystagmus: none   Conjugate gaze: present    CN V   Right facial sensation deficit: none  Left facial sensation deficit: none    CN VII   Right facial weakness: none  Left facial weakness: none    CN VIII   Hearing: intact    CN IX, X   Palate: symmetric    CN XI   Right sternocleidomastoid strength: normal  Left sternocleidomastoid strength: normal  Right trapezius strength: normal  Left trapezius strength: normal    CN XII   Tongue: not atrophic  Fasciculations: absent  Tongue deviation: none    Motor Exam   Muscle bulk: normal  Overall muscle tone: normal    Strength   Strength 5/5 throughout.     Sensory Exam   Light touch normal.     Gait, Coordination, and Reflexes     Gait  Gait: normal    Coordination   Finger to nose coordination: normal    Tremor   Resting tremor: absent  Intention tremor: absent  Action tremor: absent    Reflexes   Reflexes 2+ except as noted.   Right Orantes: absent  Left Orantes: absent  Right ankle clonus: absent  Left ankle clonus: absent      I personally reviewed the following image studies in PACS and associated radiology reports: MRI spine and xray(s). My interpretation of the radiology images/reports is: as above.

## 2024-09-26 NOTE — PROGRESS NOTES
Patient ID: Augustus Coffman is a 69 y.o. male Date of Birth 1955       Chief Complaint   Patient presents with    Follow Up Wound Care Visit       Allergies:  Lisinopril    Diagnosis:  1. Diabetic ulcer of other part of right foot associated with diabetes mellitus due to underlying condition, with fat layer exposed (HCC)  -     Wound cleansing and dressings; Future  -     lidocaine (LMX) 4 % cream  -     Wound Procedure Treatment Diabetic Ulcer Right;Plantar;Posterior Foot  2. Diabetic ulcer of other part of right foot associated with diabetes mellitus due to underlying condition, limited to breakdown of skin (HCC)  -     Wound cleansing and dressings; Future  -     lidocaine (LMX) 4 % cream  -     Wound Procedure Treatment Diabetic Ulcer Right;Plantar;Posterior Foot     Diagnosis ICD-10-CM Associated Orders   1. Diabetic ulcer of other part of right foot associated with diabetes mellitus due to underlying condition, with fat layer exposed (HCC)  E08.621 Wound cleansing and dressings    L97.512 lidocaine (LMX) 4 % cream     Wound Procedure Treatment Diabetic Ulcer Right;Plantar;Posterior Foot      2. Diabetic ulcer of other part of right foot associated with diabetes mellitus due to underlying condition, limited to breakdown of skin (HCC)  E08.621 Wound cleansing and dressings    L97.511 lidocaine (LMX) 4 % cream     Wound Procedure Treatment Diabetic Ulcer Right;Plantar;Posterior Foot           Assessment & Plan:  See wound orders.    Patient will continue to rest and stay off the foot.  Continue local wound care.  Patient is very close to being able to be fitted for an orthotics to help off-load submet 1 which will help aid in wound healing and prevention going forward.    Subjective:   The patient is doing much better since his admission for discitis and sepsis of the neck.  The patient is wearing his neck brace and is allowed to start taking it off to sleep.  The patient has another month of antibiotics  and rest.  The patient was not able to get out of a chair two weeks ago and is doing better with strength and walking.        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HCC)    History of hypertension    Hypercholesteremia    Hammer toe of right foot    Stasis dermatitis of both legs    Diabetic peripheral neuropathy (HCC)    Gout    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HCC)    Obesity with body mass index 30 or greater    Type 2 diabetes mellitus (HCC)    Retroperitoneal hematoma    Mesenteric lymphadenopathy    Acute blood loss anemia    FINA (acute kidney injury) on CKD stage 2(HCC)    GI bleed    Pleural effusion    Chronic venous hypertension (idiopathic) with ulcer of right lower extremity (HCC)    Rash    Stage 2 chronic kidney disease    Hypertensive kidney disease with stage 3 chronic kidney disease (HCC)    Other proteinuria    Obesity, morbid (HCC)    Follicular lymphoma grade I of lymph nodes of multiple sites (HCC)    Vitamin D deficiency    Stage 3a chronic kidney disease (HCC)    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 (HCC)    Ectatic thoracic aorta (HCC)    Aortic stenosis with trileaflet valve    Sepsis without acute organ dysfunction (HCC)    Acute respiratory failure with hypoxia (HCC)    Acute hyponatremia    Chronic diastolic CHF (congestive heart failure) (HCC)    Encounter for deep vein thrombosis (DVT) prophylaxis    Hyponatremia    Neck pain    Acute osteomyelitis of cervical spine (HCC)    Transaminitis    Acute renal failure with oliguria  (HCC)    MSSA bacteremia    New onset a-fib (HCC)    Acute pericarditis    Diabetic ulcer of right foot (HCC)    Constipation    Pressure injury of deep tissue of buttock    Other specified anemias     Past Medical History:   Diagnosis Date    Arthritis 2010's    Occasionally flares up    Cancer (HCC) May 2021    Still investigating    Chronic  kidney disease     Diabetes mellitus (HCC)     Follicular lymphoma (HCC)     GERD (gastroesophageal reflux disease) June 2021    Noticed in an Endoscopy    Heart murmur May 2020    High cholesterol     Hypertension     Kidney stone 1980's    Have had since 1980's    Obesity Since Childhood     Past Surgical History:   Procedure Laterality Date    BUNIONECTOMY Right 11/24/2020    Procedure: RIGHT HAV CORRECTION,;  Surgeon: Niranjan Child DPM;  [facility];  Service: Podiatry    COLONOSCOPY      INCISION AND DRAINAGE OF WOUND Right 10/05/2016    Procedure: INCISION AND DRAINAGE (I&D) EXTREMITY;  Surgeon: Niranjan Child DPM;  Location[facility];  Service:     IR BIOPSY LYMPH NODE  07/08/2021    IR EMBOLIZATION (SPECIFY VESSEL OR SITE)  07/08/2021    IR PORT PLACEMENT  9/1/2022    IR TUNNELED CENTRAL LINE PLACEMENT  8/29/2024    PILONIDAL CYST EXCISION      CO CORRECTION HAMMERTOE Right 02/19/2019    Procedure: THIRD HAMMER TOE CORRECTION;  Surgeon: Niranjan Child DPM;  [facility];  Service: Podiatry    CO RMVL MATEO CTR VAD W/SUBQ PORT/ CTR/PRPH INSJ N/A 3/14/2023    Procedure: REMOVAL VENOUS PORT (PORT-A-CATH)IR;  Surgeon: Avelino Vázquez DO;  [facility];  Service: Interventional Radiology    TOE OSTEOTOMY Right 03/14/2017    Procedure: HAMMERTOE CORRECTION R 2 ;  Surgeon: Niranjan Child DPM;  [facility];  Service:     TOE OSTEOTOMY Left 11/24/2020    Procedure: LEFT HT CORRECTION TOE;  Surgeon: Niranjan Child DPM;  [facility];  Service: Podiatry    TONSILLECTOMY  1963     Social History     Socioeconomic History    Marital status: /Civil Union     Spouse name: Not on file    Number of children: Not on file    Years of education: Not on file    Highest education level: Not on file   Occupational History    Not on file   Tobacco Use    Smoking status: Never    Smokeless tobacco: Never    Tobacco comments:     Never smoked but exposed to second hand smoke from birth  until 1980's   Vaping Use    Vaping status: Never Used   Substance and Sexual Activity    Alcohol use: Never    Drug use: Never    Sexual activity: Not Currently     Partners: Female     Birth control/protection: Abstinence   Other Topics Concern    Not on file   Social History Narrative    Not on file     Social Determinants of Health     Financial Resource Strain: Not on file   Food Insecurity: No Food Insecurity (9/3/2024)    Hunger Vital Sign     Worried About Running Out of Food in the Last Year: Never true     Ran Out of Food in the Last Year: Never true   Transportation Needs: No Transportation Needs (9/3/2024)    PRAPARE - Transportation     Lack of Transportation (Medical): No     Lack of Transportation (Non-Medical): No   Physical Activity: Not on file   Stress: Not on file   Social Connections: Unknown (6/18/2024)    Received from [employer]     How often do you feel lonely or isolated from those around you? (Adult - for ages 18 years and over): Not on file   Intimate Partner Violence: Not on file   Housing Stability: Low Risk  (9/3/2024)    Housing Stability Vital Sign     Unable to Pay for Housing in the Last Year: No     Number of Times Moved in the Last Year: 0     Homeless in the Last Year: No        Current Outpatient Medications:     acetaminophen (TYLENOL) 325 mg tablet, Take 3 tablets (975 mg total) by mouth every 8 (eight) hours as needed for mild pain or moderate pain (Patient taking differently: Take 975 mg by mouth as needed for mild pain or moderate pain), Disp: , Rfl:     amiodarone 200 mg tablet, Take 1 tablet (200 mg total) by mouth daily, Disp: 30 tablet, Rfl: 0    apixaban (ELIQUIS) 5 mg, Take 1 tablet (5 mg total) by mouth 2 (two) times a day, Disp: 60 tablet, Rfl: 0    [START ON 10/3/2024] cefadroxil (DURICEF) 500 mg capsule, Take 1 capsule (500 mg total) by mouth every 12 (twelve) hours Do not start before October 3, 2024., Disp: 60 capsule, Rfl: 0    ceFAZolin  (ANCEF) 2000 mg IVPB, Inject 2,000 mg into a catheter in a vein over 30 minutes at 100 mL/hr every 8 (eight) hours for 89 doses, Disp: , Rfl:     Cholecalciferol 100 MCG (4000 UT) CAPS, Take 1.25 capsules (5,000 Units total) by mouth in the morning, Disp: , Rfl:     metFORMIN (GLUCOPHAGE) 500 mg tablet, TAKE 2 TABLETS BY MOUTH TWICE A DAY WITH FOOD, Disp: 360 tablet, Rfl: 1    methocarbamol (ROBAXIN) 750 mg tablet, Take 1 tablet (750 mg total) by mouth every 6 (six) hours as needed for muscle spasms for up to 14 days, Disp: 56 tablet, Rfl: 0    metoprolol tartrate (LOPRESSOR) 25 mg tablet, Take 1 tablet (25 mg total) by mouth every 12 (twelve) hours, Disp: 60 tablet, Rfl: 0    Multiple Vitamins-Minerals (Centrum Silver 50+Men) TABS, , Disp: , Rfl:     polyethylene glycol (MIRALAX) 17 g packet, Take 17 g by mouth daily (Patient taking differently: Take 17 g by mouth as needed), Disp: , Rfl:     rosuvastatin (CRESTOR) 40 MG tablet, Take 1 tablet (40 mg total) by mouth every evening, Disp: 90 tablet, Rfl: 1  No current facility-administered medications for this visit.  Family History   Problem Relation Age of Onset    Cancer Father         Leukemia      Review of Systems      Objective:  /74   Pulse 81   Temp 97.6 °F (36.4 °C)   Resp 18     Physical Exam  Neurological:      Mental Status: He is alert.             Wound 09/07/23 Diabetic Ulcer Foot Right;Plantar;Posterior (Active)   Enter Singh score: Singh Grade 1: Partial or full-thickness ulcer (superficial) 09/16/24 1049   Wound Image   09/26/24 1535   Wound Description Pink;Slough;Yellow 09/26/24 1526   Delisa-wound Assessment Dry;Fragile 09/26/24 1526   Wound Length (cm) 0.4 cm 09/26/24 1526   Wound Width (cm) 0.3 cm 09/26/24 1526   Wound Depth (cm) 0.1 cm 09/26/24 1526   Wound Surface Area (cm^2) 0.12 cm^2 09/26/24 1526   Wound Volume (cm^3) 0.012 cm^3 09/26/24 1526   Calculated Wound Volume (cm^3) 0.01 cm^3 09/26/24 1526   Change in Wound Size % 99  09/26/24 1526   Tunneling 1 in depth located at 1-12 o'clock 10/05/23 1348   Number of underminings 1 07/11/24 1346   Undermining 1 0.4 07/11/24 1346   Undermining 1 is depth extending from 7-11 07/11/24 1346   Drainage Amount Scant 09/26/24 1526   Drainage Description Serosanguineous 09/26/24 1526   Non-staged Wound Description Full thickness 09/26/24 1526   Dressing Status Intact 09/16/24 1049       Wound 09/03/24 Pressure Injury Sacrum (Active)                         Debridement    Universal Protocol:  Consent: Verbal consent obtained.  Consent given by: patient  Patient understanding: patient states understanding of the procedure being performed  Patient identity confirmed: verbally with patient    Debridement Details  Performed by: physician  Debridement type: surgical  Level of debridement: subcutaneous tissue  Pain control: lidocaine 1%      Post-debridement measurements  Length (cm): 0.4  Width (cm): 0.4  Depth (cm): 0.2  Percent debrided: 100%  Surface Area (cm^2): 0.16  Area Debrided (cm^2): 0.16  Volume (cm^3): 0.03    Tissue and other material debrided: subcutaneous tissue  Devitalized tissue debrided: slough  Instrument(s) utilized: curette  Technique utilized: nonexcisionalBleeding: medium  Hemostasis obtained with: pressure  Procedural pain (0-10): insensate  Post-procedural pain: insensate   Response to treatment: procedure was tolerated well         Results from last 6 Months   Lab Units 07/29/24  0846   WOUND CULTURE  3+ Growth of Staphylococcus aureus*         Wound Instructions:  Orders Placed This Encounter   Procedures    Wound cleansing and dressings     RIGHT FOOT WOUND     Wash your hands with soap and water. Remove old dressing, discard into plastic bag and place in trash. Cleanse the wound with Normal Saline prior to applying a clean dressing. Do not use tissue or cotton balls. Do not scrub the wound. Pat dry using gauze.      Shower: No      Apply Mepilex Up to the wound.  Cover with  "Gauze and ABD.   Secure with Julisa and Tape.   Change dressing every OTHER day.     Keep at weight off of right foot at all times     Please ask Dr. Heath if it is okay to take Saroj for extra protein intake.     Standing Status:   Future     Standing Expiration Date:   10/3/2024    Wound Procedure Treatment Diabetic Ulcer Right;Plantar;Posterior Foot     This order was created via procedure documentation    Debridement     This order was created via procedure documentation         Susie Luu DPM      Portions of the record may have been created with voice recognition software. Occasional wrong word or \"sound a like\" substitutions may have occurred due to the inherent limitations of voice recognition software. Read the chart carefully and recognize, using context, where substitutions have occurred.     "

## 2024-09-26 NOTE — PROGRESS NOTES
Wound Procedure Treatment Diabetic Ulcer Right;Plantar;Posterior Foot    Performed by: Denisha Oliva LPN  Authorized by: Susie Luu DPM    Associated wounds:   Wound 09/07/23 Diabetic Ulcer Foot Right;Plantar;Posterior  Wound cleansed with:  NSS  Applied primary dressing:  Other  Applied secondary dressing:  Gauze and ABD  Dressing secured with:  Julisa and Tape

## 2024-09-30 LAB
ALBUMIN SERPL-MCNC: 3.7 G/DL (ref 3.5–5.7)
ANION GAP SERPL CALCULATED.3IONS-SCNC: 11 MMOL/L (ref 3–11)
BUN SERPL-MCNC: 15 MG/DL (ref 7–28)
CALCIUM SERPL-MCNC: 9.2 MG/DL (ref 8.5–10.1)
CHLORIDE SERPL-SCNC: 105 MMOL/L (ref 100–109)
CO2 SERPL-SCNC: 27 MMOL/L (ref 21–31)
CREAT SERPL-MCNC: 0.98 MG/DL (ref 0.53–1.3)
CYTOLOGY CMNT CVX/VAG CYTO-IMP: ABNORMAL
GFR/BSA.PRED SERPLBLD CYS-BASED-ARV: 84 ML/MIN/{1.73_M2}
GLUCOSE SERPL-MCNC: 111 MG/DL (ref 65–99)
PHOSPHATE SERPL-MCNC: 3.1 MG/DL (ref 2.3–4.6)
POTASSIUM SERPL-SCNC: 4.7 MMOL/L (ref 3.5–5.2)
SODIUM SERPL-SCNC: 143 MMOL/L (ref 135–145)

## 2024-10-02 NOTE — PROGRESS NOTES
Ambulatory Visit  Name: Augustus Coffman      : 1955      MRN: [id number]  Encounter Provider: Carolyn Goode PA-C  Encounter Date: 10/3/2024   Encounter department: [facility]    Assessment & Plan  MSSA bacteremia   [place] to be a primary foot source from a right foot ulceration with hardware present.  Complicated by cervical spine infection below.  2D echo limited.  Concern remains for endovascular/perivalvular complications given possible prolonged bacteremia. Patient was also too high risk for LUCRECIA. Blood cultures cleared on . Discharged on 6 week course of cefazolin. Most recent labs reviewed from : WBC slightly increased at 10.2, Serum creatinine improved from 1.28 to 0.98. CRP improved from 31.1 to 12.8, ESR improved from 130 to 41. Will continue to trend inflammatory markers. Plan to continue prolonged PO antibiotic course until follow-up MRI C spine.   -Completed 6 weeks of therapy with cefazolin on 10/2/2024  -Transitioned to PO Duricef 500mg q12h on 10/3  -Continue PO Duricef pending repeat MRI C spine. MRI C spine scheduled for 10/11.  -Obtain CBCD, creatinine, ESR and CRP prior to next follow-up appointment  -Further cardiac imaging and follow-up with cardiology as outpatient  -IR tunneled central line removal scheduled for 10/9.  -RTC in 2 weeks on 10/17 to discuss MRI C spine findings and next steps for antibiotic plan.   Acute osteomyelitis of cervical spine (HCC)   C5-C6 discitis/osteomyelitis with epidural phlegmon/abscess.  Found on outpatient imaging.  As a complication of MSSA bacteremia.  Patient fortunately without any focal neurologic deficits.  No other lesions on MRI T and L-spine.  Repeat MRI C-spine now without any epidural phlegmon/abscess.  Only paravertebral abscess remains. Sedimentation rate increased to >130 on , but has now improved to 41 on . though CRP continues to downtrend from 52 > 31.3 > 12.8.  -Continue antibiotics as  above  -Follow-up with outpatient neurosurgery  -Schedule MRI C spine scheduled for 10/11.  -Re-check ESR/CRP prior to next office visit as above.   Acute pericarditis, unspecified type  Acute pericarditis with new pericardial effusion.  Patient developed abrupt onset of chest pain with ST elevations.  Troponins negative.  Clinical picture consistent with acute pericarditis.  Uncertain however if he had a purulent pericarditis.   -Follow-up with cardiology. Appointment scheduled for 11/15/24.   -Repeat cardiac imaging as outpatient with cardiology  Chronic kidney disease  Lab Results   Component Value Date    EGFR 84 09/30/2024    EGFR 88 09/24/2024    EGFR 57 09/16/2024    CREATININE 0.98 09/30/2024    CREATININE 0.94 09/24/2024    CREATININE 1.28 09/16/2024    Chronic kidney disease. Relatively stable without any current worsening of the renal function. Serum creatinine improved from 1.28 to 0.94. Will continue to trend and make dose adjustments if needed while on PO cefadroxil.   -Will dose adjust antibiotics as needed  -Obtain serum creatinine prior to next appointment  Type 2 diabetes mellitus with stage 3 chronic kidney disease, without long-term current use of insulin, unspecified whether stage 3a or 3b CKD ([facility])    Lab Results   Component Value Date    HGBA1C 6.8 (H) 07/05/2024   Type 2 diabetes and obesity. C/b peripheral neuropathy. Hemoglobin A1c of 6.8.  BMI of 34.  Both as risk factors for recurrent infection.  -Recommend tight glycemic control  Long term (current) use of antibiotics    Orders:    CBC and differential; Future    Creatinine, serum; Future    Sedimentation rate, automated; Future    C-reactive protein; Future      History of Present Illness     Augustus Coffman is a 69 y.o. male with pmhx of T2DM c/b peripheral neuropathy, diabetic foot wound, HTN, CHF, stasis dermatitis of bilateral lower extremities, HLD, CKD, follicular lymphoma now in remission, who presented to [facility]  ED on 8/20 after an outpatient MRI revealed cervical osteomyelitis/discitis at C5-C6 with 3 mm epidural phlegmon/small abscess. Patient had an earlier admission at the end of July for right diabetic foot wound and culture isolated MSSA.  Admission blood culture was positive.  Patient was treated with short course of antibiotic.  Course of the time was complicated by neck pain and CT imaging had been unremarkable favoring musculoskeletal process.  Patient was advised to follow-up with orthopedics and when he did based on his exam and MRI was ordered which demonstrated the osteomyelitis and he was recommended to come into the ER. Blood cultures on admission resulted positive for MSSA. Patient was started on IV cefazolin. While on antibiotics, patient started to developed acute onset chest pain on 8/22. EKG notable for ST elevations. Troponins unremarkable. Echo was completed which showed a new moderate pericardial effusion not present on prior echo concerning for pericarditis with possible purulent effusion. Patient was transferred to [facility] on 8/23 for consideration of surgical intervention and possible pericardiocentesis. Hospital course complicated by Afib with RVR requiring ICU admission. Patient was evaluated by thoracic surgery and there was no indication for acute surgical intervention for the pericardial effusion. 2D echo limited.  Concern remained for endovascular/perivalvular complications given possible prolonged bacteremia. Patient also too high risk for LUCRECIA now. Blood cultures cleared on 8/22. Discharged on 6 week course of cefazolin. He now presents to ID clinic for follow-up.      Has still been feeling queasy on the IV cefazolin, but no nausea or vomiting. Completed the course yesterday and started on PO cefadroxil this morning. Neck pain nearly resolved. Working with physical therapy a couple times a week.  No chest pain or cough. Some intermittent SOB. No fevers or chills. No night sweats. No missed  doses of antibiotic. No rashes. No new symptoms.    Antibiotics:  PO Cefadroxil    S/p IV cefazolin    History obtained from : patient  Review of Systems  Pertinent Medical History   Medical History Reviewed by provider this encounter:  Tobacco  Allergies  Meds  Problems  Med Hx  Surg Hx  Fam Hx       Current Outpatient Medications on File Prior to Visit   Medication Sig Dispense Refill    amiodarone 200 mg tablet Take 1 tablet (200 mg total) by mouth daily 30 tablet 0    apixaban (ELIQUIS) 5 mg Take 1 tablet (5 mg total) by mouth 2 (two) times a day 60 tablet 0    cefadroxil (DURICEF) 500 mg capsule Take 1 capsule (500 mg total) by mouth every 12 (twelve) hours Do not start before October 3, 2024. 60 capsule 0    Cholecalciferol 100 MCG (4000 UT) CAPS Take 1.25 capsules (5,000 Units total) by mouth in the morning      metFORMIN (GLUCOPHAGE) 500 mg tablet TAKE 2 TABLETS BY MOUTH TWICE A DAY WITH FOOD 360 tablet 1    metoprolol tartrate (LOPRESSOR) 25 mg tablet Take 1 tablet (25 mg total) by mouth every 12 (twelve) hours 60 tablet 0    Multiple Vitamins-Minerals (Centrum Silver 50+Men) TABS       polyethylene glycol (MIRALAX) 17 g packet Take 17 g by mouth daily (Patient taking differently: Take 17 g by mouth as needed)      rosuvastatin (CRESTOR) 40 MG tablet Take 1 tablet (40 mg total) by mouth every evening 90 tablet 1    acetaminophen (TYLENOL) 325 mg tablet Take 3 tablets (975 mg total) by mouth every 8 (eight) hours as needed for mild pain or moderate pain (Patient not taking: Reported on 10/3/2024)      ceFAZolin (ANCEF) 2000 mg IVPB Inject 2,000 mg into a catheter in a vein over 30 minutes at 100 mL/hr every 8 (eight) hours for 89 doses (Patient not taking: Reported on 10/3/2024)      methocarbamol (ROBAXIN) 750 mg tablet Take 1 tablet (750 mg total) by mouth every 6 (six) hours as needed for muscle spasms for up to 14 days 56 tablet 0     No current facility-administered medications on file prior to  "visit.      Social History     Tobacco Use    Smoking status: Never    Smokeless tobacco: Never    Tobacco comments:     Never smoked but exposed to second hand smoke from birth until 1980's   Vaping Use    Vaping status: Never Used   Substance and Sexual Activity    Alcohol use: Never    Drug use: Never    Sexual activity: Not Currently     Partners: Female     Birth control/protection: Abstinence     Objective     /78   Pulse 68   Temp 97.6 °F (36.4 °C)   Resp 18   Ht 6' 3\" (1.905 m)   Wt 128 kg (283 lb)   SpO2 98%   BMI 35.37 kg/m²     Physical Exam    General Appearance:  Alert, interactive, nontoxic, no acute distress. Sitting upright in chair next to exam table. C-spine collar in place.    Throat: Oropharynx moist without lesions.    Lungs:   Clear to auscultation bilaterally; no wheezes, rhonchi or rales; respirations unlabored. On room air.    Heart:  RRR; +murmur, no rub or gallop.   Abdomen:   Soft, non-tender, non-distended, positive bowel sounds.     Extremities: No clubbing or cyanosis. Trace pitting edema bilaterally. Venous stasis changes to bilateral lower extremities.    Skin: No new rashes, lesions, or draining wounds noted on exposed skin.      Labs, Imaging, & Other studies:   All pertinent labs and imaging studies were personally reviewed by me including    ESR: increased from 95 to >130 > now improved to 41 (9/24/24)  CRP: improved from 52.6 > 31.3 > 12.8 (9/24/24)    9/30 BMP: Scr improved from 1.28 to 0.98, electrolytes WNL    Carolyn Goode PA-C  [facility]     "

## 2024-10-02 NOTE — ASSESSMENT & PLAN NOTE
Lab Results   Component Value Date    HGBA1C 6.8 (H) 07/05/2024   Type 2 diabetes and obesity. C/b peripheral neuropathy. Hemoglobin A1c of 6.8.  BMI of 34.  Both as risk factors for recurrent infection.  -Recommend tight glycemic control

## 2024-10-02 NOTE — ASSESSMENT & PLAN NOTE
[place] to be a primary foot source from a right foot ulceration with hardware present.  Complicated by cervical spine infection below.  2D echo limited.  Concern remains for endovascular/perivalvular complications given possible prolonged bacteremia. Patient was also too high risk for LUCRECIA. Blood cultures cleared on 8/22. Discharged on 6 week course of cefazolin. Most recent labs reviewed from 9/24: WBC slightly increased at 10.2, Serum creatinine improved from 1.28 to 0.98. CRP improved from 31.1 to 12.8, ESR improved from 130 to 41. Will continue to trend inflammatory markers. Plan to continue prolonged PO antibiotic course until follow-up MRI C spine.   -Completed 6 weeks of therapy with cefazolin on 10/2/2024  -Transitioned to PO Duricef 500mg q12h on 10/3  -Continue PO Duricef pending repeat MRI C spine. MRI C spine scheduled for 10/11.  -Obtain CBCD, creatinine, ESR and CRP prior to next follow-up appointment  -Further cardiac imaging and follow-up with cardiology as outpatient  -IR tunneled central line removal scheduled for 10/9.  -RTC in 2 weeks on 10/17 to discuss MRI C spine findings and next steps for antibiotic plan.

## 2024-10-02 NOTE — ASSESSMENT & PLAN NOTE
Acute pericarditis with new pericardial effusion.  Patient developed abrupt onset of chest pain with ST elevations.  Troponins negative.  Clinical picture consistent with acute pericarditis.  Uncertain however if he had a purulent pericarditis.   -Follow-up with cardiology. Appointment scheduled for 11/15/24.   -Repeat cardiac imaging as outpatient with cardiology

## 2024-10-02 NOTE — ASSESSMENT & PLAN NOTE
C5-C6 discitis/osteomyelitis with epidural phlegmon/abscess.  Found on outpatient imaging.  As a complication of MSSA bacteremia.  Patient fortunately without any focal neurologic deficits.  No other lesions on MRI T and L-spine.  Repeat MRI C-spine now without any epidural phlegmon/abscess.  Only paravertebral abscess remains. Sedimentation rate increased to >130 on 9/9, but has now improved to 41 on 9/24. though CRP continues to downtrend from 52 > 31.3 > 12.8.  -Continue antibiotics as above  -Follow-up with outpatient neurosurgery  -Schedule MRI C spine scheduled for 10/11.  -Re-check ESR/CRP prior to next office visit as above.

## 2024-10-03 ENCOUNTER — TELEPHONE (OUTPATIENT)
Dept: NEPHROLOGY | Facility: CLINIC | Age: 69
End: 2024-10-03

## 2024-10-03 ENCOUNTER — APPOINTMENT (OUTPATIENT)
Dept: WOUND CARE | Facility: CLINIC | Age: 69
End: 2024-10-03
Payer: MEDICARE

## 2024-10-03 ENCOUNTER — OFFICE VISIT (OUTPATIENT)
Dept: WOUND CARE | Facility: CLINIC | Age: 69
End: 2024-10-03
Payer: MEDICARE

## 2024-10-03 ENCOUNTER — OFFICE VISIT (OUTPATIENT)
Dept: INFECTIOUS DISEASES | Facility: CLINIC | Age: 69
End: 2024-10-03
Payer: MEDICARE

## 2024-10-03 VITALS
OXYGEN SATURATION: 98 % | HEIGHT: 75 IN | SYSTOLIC BLOOD PRESSURE: 145 MMHG | RESPIRATION RATE: 18 BRPM | TEMPERATURE: 97.6 F | DIASTOLIC BLOOD PRESSURE: 78 MMHG | WEIGHT: 283 LBS | BODY MASS INDEX: 35.19 KG/M2 | HEART RATE: 68 BPM

## 2024-10-03 VITALS
HEART RATE: 77 BPM | RESPIRATION RATE: 18 BRPM | TEMPERATURE: 97 F | SYSTOLIC BLOOD PRESSURE: 150 MMHG | DIASTOLIC BLOOD PRESSURE: 84 MMHG

## 2024-10-03 DIAGNOSIS — R78.81 MSSA BACTEREMIA: Primary | ICD-10-CM

## 2024-10-03 DIAGNOSIS — Z79.2 LONG TERM (CURRENT) USE OF ANTIBIOTICS: ICD-10-CM

## 2024-10-03 DIAGNOSIS — N18.30 TYPE 2 DIABETES MELLITUS WITH STAGE 3 CHRONIC KIDNEY DISEASE, WITHOUT LONG-TERM CURRENT USE OF INSULIN, UNSPECIFIED WHETHER STAGE 3A OR 3B CKD (HCC): ICD-10-CM

## 2024-10-03 DIAGNOSIS — L97.512 DIABETIC ULCER OF OTHER PART OF RIGHT FOOT ASSOCIATED WITH DIABETES MELLITUS DUE TO UNDERLYING CONDITION, WITH FAT LAYER EXPOSED (HCC): Primary | ICD-10-CM

## 2024-10-03 DIAGNOSIS — I30.9 ACUTE PERICARDITIS, UNSPECIFIED TYPE: ICD-10-CM

## 2024-10-03 DIAGNOSIS — E11.22 TYPE 2 DIABETES MELLITUS WITH STAGE 3 CHRONIC KIDNEY DISEASE, WITHOUT LONG-TERM CURRENT USE OF INSULIN, UNSPECIFIED WHETHER STAGE 3A OR 3B CKD (HCC): ICD-10-CM

## 2024-10-03 DIAGNOSIS — B95.61 MSSA BACTEREMIA: Primary | ICD-10-CM

## 2024-10-03 DIAGNOSIS — N18.9 CHRONIC KIDNEY DISEASE: ICD-10-CM

## 2024-10-03 DIAGNOSIS — M46.22 ACUTE OSTEOMYELITIS OF CERVICAL SPINE (HCC): ICD-10-CM

## 2024-10-03 DIAGNOSIS — E08.621 DIABETIC ULCER OF OTHER PART OF RIGHT FOOT ASSOCIATED WITH DIABETES MELLITUS DUE TO UNDERLYING CONDITION, WITH FAT LAYER EXPOSED (HCC): Primary | ICD-10-CM

## 2024-10-03 PROCEDURE — 99214 OFFICE O/P EST MOD 30 MIN: CPT | Performed by: PHYSICIAN ASSISTANT

## 2024-10-03 PROCEDURE — 97597 DBRDMT OPN WND 1ST 20 CM/<: CPT | Performed by: PODIATRIST

## 2024-10-03 RX ORDER — LIDOCAINE 40 MG/G
CREAM TOPICAL ONCE
Status: COMPLETED | OUTPATIENT
Start: 2024-10-03 | End: 2024-10-03

## 2024-10-03 RX ADMIN — LIDOCAINE: 40 CREAM TOPICAL at 15:40

## 2024-10-03 NOTE — PATIENT INSTRUCTIONS
Orders Placed This Encounter   Procedures    Wound cleansing and dressings Diabetic Ulcer Right;Plantar;Posterior Foot     Wound cleansing and dressings         RIGHT FOOT WOUND     Wash your hands with soap and water. Remove old dressing, discard into plastic bag and place in trash. Cleanse the wound with Normal Saline prior to applying a clean dressing. Do not use tissue or cotton balls. Do not scrub the wound. Pat dry using gauze.      Shower: No      Apply Mepilex Up to the wound.  Cover with Gauze and ABD.   Secure with Julisa and Tape.   Change dressing every OTHER day.     Keep at weight off of right foot at all times    Please make an appointment now with [facility] to start getting set up for shoes [phone]     Standing Status:   Future     Standing Expiration Date:   10/10/2024    Wound Procedure Treatment Diabetic Ulcer Right;Plantar;Posterior Foot     This order was created via procedure documentation

## 2024-10-03 NOTE — TELEPHONE ENCOUNTER
Called and LM stating that 03/17 appt was rescheduled for 03/13. Asked pt to call us back to confirm that appt.

## 2024-10-03 NOTE — PATIENT INSTRUCTIONS
Continue on PO Duricef (cefadroxil) 500mg every 12 hours (twice daily). Can take with food.   PICC line to be removed on 10/9 as scheduled.   Obtain repeat MRI C spine on 10/11 as scheduled  Please obtain repeat lab work including a CBCd, serum creatinine, ESR/CRP on 10/14 or 10/15 prior to your next follow-up appointment  Follow-up with me at this office on 10/17 at 11:30 AM.   Call the ID office with questions, concerns, or potential side effects in the interim.

## 2024-10-03 NOTE — PROGRESS NOTES
Wound Procedure Treatment Diabetic Ulcer Right;Plantar;Posterior Foot    Performed by: Rohini Roche RN  Authorized by: Susie Luu DPM    Associated wounds:   Wound 09/07/23 Diabetic Ulcer Foot Right;Plantar;Posterior  Wound cleansed with:  NSS  Applied primary dressing:  Other and Polymem foam  Applied secondary dressing:  Gauze and ABD  Dressing secured with:  Julisa and Tape

## 2024-10-03 NOTE — PROGRESS NOTES
Patient ID: Augustus Coffman is a 69 y.o. male Date of Birth 1955       Chief Complaint   Patient presents with    Follow Up Wound Care Visit     Right diabetic foot ulcer        Allergies:  Lisinopril    Diagnosis:  1. Diabetic ulcer of other part of right foot associated with diabetes mellitus due to underlying condition, with fat layer exposed (HCC)  -     lidocaine (LMX) 4 % cream  -     Wound Procedure Treatment Diabetic Ulcer Right;Plantar;Posterior Foot  -     Wound cleansing and dressings Diabetic Ulcer Right;Plantar;Posterior Foot; Future     Diagnosis ICD-10-CM Associated Orders   1. Diabetic ulcer of other part of right foot associated with diabetes mellitus due to underlying condition, with fat layer exposed (HCC)  E08.621 lidocaine (LMX) 4 % cream    L97.512 Wound Procedure Treatment Diabetic Ulcer Right;Plantar;Posterior Foot     Wound cleansing and dressings Diabetic Ulcer Right;Plantar;Posterior Foot           Assessment & Plan:  See wound orders.   The patient is doing well and ready for fitting of diabetic orthotics.  Patient should call my office for fitting.  Continue local wound care and rest.  Follow up one week.    Subjective:   The patient's foot is doing well since he is still off his foot.      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HCC)    History of hypertension    Hypercholesteremia    Hammer toe of right foot    Stasis dermatitis of both legs    Diabetic peripheral neuropathy (HCC)    Gout    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HCC)    Obesity with body mass index 30 or greater    Type 2 diabetes mellitus (HCC)    Retroperitoneal hematoma    Mesenteric lymphadenopathy    Acute blood loss anemia    FINA (acute kidney injury) on CKD stage 2(HCC)    GI bleed    Pleural effusion    Chronic venous hypertension (idiopathic) with ulcer of right lower extremity  (HCC)    Rash    Stage 2 chronic kidney disease    Hypertensive kidney disease with stage 3 chronic kidney disease (HCC)    Other proteinuria    Obesity, morbid (HCC)    Follicular lymphoma grade I of lymph nodes of multiple sites (HCC)    Vitamin D deficiency    Stage 3a chronic kidney disease (HCC)    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 (HCC)    Ectatic thoracic aorta (HCC)    Aortic stenosis with trileaflet valve    Sepsis without acute organ dysfunction (HCC)    Acute respiratory failure with hypoxia (HCC)    Acute hyponatremia    Chronic diastolic CHF (congestive heart failure) (HCC)    Encounter for deep vein thrombosis (DVT) prophylaxis    Hyponatremia    Neck pain    Acute osteomyelitis of cervical spine (HCC)    Transaminitis    Acute renal failure with oliguria  (HCC)    MSSA bacteremia    New onset a-fib (HCC)    Acute pericarditis    Diabetic ulcer of right foot (HCC)    Constipation    Pressure injury of deep tissue of buttock    Other specified anemias     Past Medical History:   Diagnosis Date    Arthritis 2010's    Occasionally flares up    Cancer (HCC) May 2021    Still investigating    Chronic kidney disease     Diabetes mellitus (HCC)     Follicular lymphoma (HCC)     GERD (gastroesophageal reflux disease) June 2021    Noticed in an Endoscopy    Heart murmur May 2020    High cholesterol     Hypertension     Kidney stone 1980's    Have had since 1980's    Obesity Since Childhood     Past Surgical History:   Procedure Laterality Date    BUNIONECTOMY Right 11/24/2020    Procedure: RIGHT HAV CORRECTION,;  Surgeon: Niranjan Child DPM;  [facility];  Service: Podiatry    COLONOSCOPY      INCISION AND DRAINAGE OF WOUND Right 10/05/2016    Procedure: INCISION AND DRAINAGE (I&D) EXTREMITY;  Surgeon: Nrianjan Child DPM;  Location[facility];  Service:     IR BIOPSY LYMPH NODE  07/08/2021    IR EMBOLIZATION (SPECIFY VESSEL OR SITE)  07/08/2021    IR PORT PLACEMENT  9/1/2022    IR TUNNELED CENTRAL LINE  PLACEMENT  8/29/2024    PILONIDAL CYST EXCISION      WA CORRECTION HAMMERTOE Right 02/19/2019    Procedure: THIRD HAMMER TOE CORRECTION;  Surgeon: Niranjan Child DPM;  [facility];  Service: Podiatry    WA RMVL MATEO CTR VAD W/SUBQ PORT/ CTR/PRPH INSJ N/A 3/14/2023    Procedure: REMOVAL VENOUS PORT (PORT-A-CATH)IR;  Surgeon: Avelino Vázquez DO;  [facility];  Service: Interventional Radiology    TOE OSTEOTOMY Right 03/14/2017    Procedure: HAMMERTOE CORRECTION R 2 ;  Surgeon: Niranjan Child DPM;  [facility];  Service:     TOE OSTEOTOMY Left 11/24/2020    Procedure: LEFT HT CORRECTION TOE;  Surgeon: Niranjan Child DPM;  [facility];  Service: Podiatry    TONSILLECTOMY  1963     Social History     Socioeconomic History    Marital status: /Civil Union     Spouse name: None    Number of children: None    Years of education: None    Highest education level: None   Occupational History    None   Tobacco Use    Smoking status: Never    Smokeless tobacco: Never    Tobacco comments:     Never smoked but exposed to second hand smoke from birth until 1980's   Vaping Use    Vaping status: Never Used   Substance and Sexual Activity    Alcohol use: Never    Drug use: Never    Sexual activity: Not Currently     Partners: Female     Birth control/protection: Abstinence   Other Topics Concern    None   Social History Narrative    None     Social Determinants of Health     Financial Resource Strain: Not on file   Food Insecurity: No Food Insecurity (9/3/2024)    Hunger Vital Sign     Worried About Running Out of Food in the Last Year: Never true     Ran Out of Food in the Last Year: Never true   Transportation Needs: No Transportation Needs (9/3/2024)    PRAPARE - Transportation     Lack of Transportation (Medical): No     Lack of Transportation (Non-Medical): No   Physical Activity: Not on file   Stress: Not on file   Social Connections: Unknown (6/18/2024)    Received from [facility]     Social Connections     How often do you feel lonely or isolated from those around you? (Adult - for ages 18 years and over): Not on file   Intimate Partner Violence: Not on file   Housing Stability: Low Risk  (9/3/2024)    Housing Stability Vital Sign     Unable to Pay for Housing in the Last Year: No     Number of Times Moved in the Last Year: 0     Homeless in the Last Year: No        Current Outpatient Medications:     acetaminophen (TYLENOL) 325 mg tablet, Take 3 tablets (975 mg total) by mouth every 8 (eight) hours as needed for mild pain or moderate pain (Patient not taking: Reported on 10/3/2024), Disp: , Rfl:     amiodarone 200 mg tablet, Take 1 tablet (200 mg total) by mouth daily, Disp: 30 tablet, Rfl: 0    apixaban (ELIQUIS) 5 mg, Take 1 tablet (5 mg total) by mouth 2 (two) times a day, Disp: 60 tablet, Rfl: 0    cefadroxil (DURICEF) 500 mg capsule, Take 1 capsule (500 mg total) by mouth every 12 (twelve) hours Do not start before October 3, 2024., Disp: 60 capsule, Rfl: 0    ceFAZolin (ANCEF) 2000 mg IVPB, Inject 2,000 mg into a catheter in a vein over 30 minutes at 100 mL/hr every 8 (eight) hours for 89 doses (Patient not taking: Reported on 10/3/2024), Disp: , Rfl:     Cholecalciferol 100 MCG (4000 UT) CAPS, Take 1.25 capsules (5,000 Units total) by mouth in the morning, Disp: , Rfl:     metFORMIN (GLUCOPHAGE) 500 mg tablet, TAKE 2 TABLETS BY MOUTH TWICE A DAY WITH FOOD, Disp: 360 tablet, Rfl: 1    methocarbamol (ROBAXIN) 750 mg tablet, Take 1 tablet (750 mg total) by mouth every 6 (six) hours as needed for muscle spasms for up to 14 days, Disp: 56 tablet, Rfl: 0    metoprolol tartrate (LOPRESSOR) 25 mg tablet, Take 1 tablet (25 mg total) by mouth every 12 (twelve) hours, Disp: 60 tablet, Rfl: 0    Multiple Vitamins-Minerals (Centrum Silver 50+Men) TABS, , Disp: , Rfl:     polyethylene glycol (MIRALAX) 17 g packet, Take 17 g by mouth daily (Patient taking differently: Take 17 g by mouth as needed),  Disp: , Rfl:     rosuvastatin (CRESTOR) 40 MG tablet, Take 1 tablet (40 mg total) by mouth every evening, Disp: 90 tablet, Rfl: 1  No current facility-administered medications for this visit.  Family History   Problem Relation Age of Onset    Cancer Father         Leukemia      Review of Systems   Constitutional:  Negative for chills and fever.       Objective:  /84   Pulse 77   Temp (!) 97 °F (36.1 °C)   Resp 18     Physical Exam  Neurological:      Mental Status: He is alert.         Wound 09/07/23 Diabetic Ulcer Foot Right;Plantar;Posterior (Active)   Enter Singh score: Singh Grade 1: Partial or full-thickness ulcer (superficial) 10/03/24 1532   Wound Image   10/03/24 1537   Wound Description Pink 10/03/24 1532   Delisa-wound Assessment Dry;Fragile 10/03/24 1532   Wound Length (cm) 0.1 cm 10/03/24 1532   Wound Width (cm) 0.1 cm 10/03/24 1532   Wound Depth (cm) 0.1 cm 10/03/24 1532   Wound Surface Area (cm^2) 0.01 cm^2 10/03/24 1532   Wound Volume (cm^3) 0.001 cm^3 10/03/24 1532   Calculated Wound Volume (cm^3) 0 cm^3 10/03/24 1532   Change in Wound Size % 100 10/03/24 1532   Tunneling 1 in depth located at 1-12 o'clock 10/05/23 1348   Number of underminings 1 07/11/24 1346   Undermining 1 0.4 07/11/24 1346   Undermining 1 is depth extending from 7-11 07/11/24 1346   Drainage Amount Scant 10/03/24 1532   Drainage Description Serosanguineous 10/03/24 1532   Non-staged Wound Description Full thickness 10/03/24 1532   Dressing Status Intact 10/03/24 1532       Wound 09/03/24 Pressure Injury Sacrum (Active)                         Debridement    Universal Protocol:  Consent: Verbal consent obtained.  Consent given by: patient  Patient understanding: patient states understanding of the procedure being performed  Patient identity confirmed: verbally with patient    Debridement Details  Performed by: physician  Debridement type: selective      Post-debridement measurements  Length (cm): 0.2  Width (cm):  "0.1  Depth (cm): 0.1  Percent debrided: 100%  Surface Area (cm^2): 0.02  Area Debrided (cm^2): 0.02  Volume (cm^3): 0    Devitalized tissue debrided: fibrin  Instrument(s) utilized: curette  Technique utilized: nonexcisionalBleeding: small  Hemostasis obtained with: pressure and silver nitrate  Procedural pain (0-10): insensate  Post-procedural pain: insensate   Response to treatment: procedure was tolerated well       Results from last 6 Months   Lab Units 07/29/24  0846   WOUND CULTURE  3+ Growth of Staphylococcus aureus*         Wound Instructions:  Orders Placed This Encounter   Procedures    Wound Procedure Treatment Diabetic Ulcer Right;Plantar;Posterior Foot     This order was created via procedure documentation    Wound cleansing and dressings Diabetic Ulcer Right;Plantar;Posterior Foot     Wound cleansing and dressings         RIGHT FOOT WOUND     Wash your hands with soap and water. Remove old dressing, discard into plastic bag and place in trash. Cleanse the wound with Normal Saline prior to applying a clean dressing. Do not use tissue or cotton balls. Do not scrub the wound. Pat dry using gauze.      Shower: No      Apply Mepilex Up to the wound.  Cover with Gauze and ABD.   Secure with Julisa and Tape.   Change dressing every OTHER day.     Keep at weight off of right foot at all times    Please make an appointment now with [facility] to start getting set up for shoes [phone]     Standing Status:   Future     Standing Expiration Date:   10/10/2024         Susie Luu DPM      Portions of the record may have been created with voice recognition software. Occasional wrong word or \"sound a like\" substitutions may have occurred due to the inherent limitations of voice recognition software. Read the chart carefully and recognize, using context, where substitutions have occurred.     "

## 2024-10-04 DIAGNOSIS — I48.91 NEW ONSET A-FIB (HCC): ICD-10-CM

## 2024-10-04 RX ORDER — AMIODARONE HYDROCHLORIDE 200 MG/1
200 TABLET ORAL DAILY
Qty: 30 TABLET | Refills: 0 | Status: SHIPPED | OUTPATIENT
Start: 2024-10-04

## 2024-10-04 RX ORDER — METOPROLOL TARTRATE 25 MG/1
25 TABLET, FILM COATED ORAL EVERY 12 HOURS SCHEDULED
Qty: 60 TABLET | Refills: 5 | Status: SHIPPED | OUTPATIENT
Start: 2024-10-04

## 2024-10-04 NOTE — TELEPHONE ENCOUNTER
Medications: Eliquis 5 mg    Metoprolol Tartrate 25 mg    Amiodarone  mg    Office:   [x] PCP/Provider -   [] Speciality/Provider -     Does the patient have enough for 3 days?   [] Yes   [x] No - Send as HP to POD

## 2024-10-08 ENCOUNTER — OFFICE VISIT (OUTPATIENT)
Dept: FAMILY MEDICINE CLINIC | Facility: CLINIC | Age: 69
End: 2024-10-08
Payer: MEDICARE

## 2024-10-08 VITALS
OXYGEN SATURATION: 98 % | TEMPERATURE: 97.5 F | DIASTOLIC BLOOD PRESSURE: 82 MMHG | HEART RATE: 70 BPM | BODY MASS INDEX: 35.37 KG/M2 | SYSTOLIC BLOOD PRESSURE: 120 MMHG | HEIGHT: 75 IN

## 2024-10-08 DIAGNOSIS — L97.512 DIABETIC ULCER OF OTHER PART OF RIGHT FOOT ASSOCIATED WITH DIABETES MELLITUS DUE TO UNDERLYING CONDITION, WITH FAT LAYER EXPOSED (HCC): Primary | ICD-10-CM

## 2024-10-08 DIAGNOSIS — I50.32 CHRONIC DIASTOLIC CHF (CONGESTIVE HEART FAILURE) (HCC): Chronic | ICD-10-CM

## 2024-10-08 DIAGNOSIS — R60.0 BILATERAL LOWER EXTREMITY EDEMA: ICD-10-CM

## 2024-10-08 DIAGNOSIS — E08.621 DIABETIC ULCER OF OTHER PART OF RIGHT FOOT ASSOCIATED WITH DIABETES MELLITUS DUE TO UNDERLYING CONDITION, WITH FAT LAYER EXPOSED (HCC): Primary | ICD-10-CM

## 2024-10-08 LAB — SL AMB POCT HEMOGLOBIN AIC: 5.5 (ref ?–6.5)

## 2024-10-08 PROCEDURE — 99214 OFFICE O/P EST MOD 30 MIN: CPT | Performed by: FAMILY MEDICINE

## 2024-10-08 PROCEDURE — 83036 HEMOGLOBIN GLYCOSYLATED A1C: CPT | Performed by: FAMILY MEDICINE

## 2024-10-08 RX ORDER — FUROSEMIDE 20 MG
20 TABLET ORAL DAILY
Qty: 30 TABLET | Refills: 1 | Status: SHIPPED | OUTPATIENT
Start: 2024-10-08

## 2024-10-08 RX ORDER — CLINDAMYCIN HCL 150 MG
150 CAPSULE ORAL EVERY 8 HOURS SCHEDULED
Qty: 30 CAPSULE | Refills: 0 | Status: CANCELLED | OUTPATIENT
Start: 2024-10-08 | End: 2024-10-18

## 2024-10-08 NOTE — ASSESSMENT & PLAN NOTE
Lab Results   Component Value Date    HGBA1C 5.5 10/08/2024       Orders:    POCT hemoglobin A1c

## 2024-10-08 NOTE — PROGRESS NOTES
"Ambulatory Visit  Name: Augustus Coffman      : 1955      MRN: [id number]  Encounter Provider: Gwen Lazo DO  Encounter Date: 10/8/2024   Encounter department: [facility] [address]    Assessment & Plan  Diabetic ulcer of other part of right foot associated with diabetes mellitus due to underlying condition, with fat layer exposed (HCC)    Lab Results   Component Value Date    HGBA1C 5.5 10/08/2024       Orders:    POCT hemoglobin A1c    Bilateral lower extremity edema  Will restart Lasix and repeat BMP in 5-7 days. Patient to notify office if swelling does not improve or if he develops SOB.   Orders:    furosemide (LASIX) 20 mg tablet; Take 1 tablet (20 mg total) by mouth daily       History of Present Illness     HPI    Patient presents to the office with the concern of bilateral LE edema about 1.5-2 weeks, thought that this would resolve but has not. Notes that the right is more swollen than the left, pitting. No change with elevating. Denies any pain. Having some clear fluid drainage on the legs B/L.  Has not had swelling to this degree. Note some stiffness behind the right knee. Notes that he continues with SOB since the hospitalization. Denies fever or chills. Denies sweats. Denies right knee injury.     He is currently on Duricef, completed IV ABX with planned pick removal tomorrow.     Right foot wound is improving, he is going to get fitted for orthotics.       Review of Systems      Objective     /82 (BP Location: Left arm, Patient Position: Sitting, Cuff Size: Large)   Pulse 70   Temp 97.5 °F (36.4 °C) (Tympanic)   Ht 6' 3\" (1.905 m)   SpO2 98%   BMI 35.37 kg/m²     Physical Exam  Vitals reviewed.   Constitutional:       General: He is not in acute distress.     Appearance: He is obese.   HENT:      Head: Normocephalic and atraumatic.      Right Ear: External ear normal.      Left Ear: External ear normal.      Nose: Nose normal.      " Mouth/Throat:      Mouth: Mucous membranes are moist.   Eyes:      Extraocular Movements: Extraocular movements intact.      Conjunctiva/sclera: Conjunctivae normal.   Neck:      Comments: Cervical collar in place  Cardiovascular:      Rate and Rhythm: Normal rate and regular rhythm.      Heart sounds: Murmur heard.   Pulmonary:      Effort: Pulmonary effort is normal.      Breath sounds: Normal breath sounds. No wheezing, rhonchi or rales.   Abdominal:      General: Bowel sounds are normal.      Palpations: Abdomen is soft.   Musculoskeletal:      Right lower leg: Edema present.      Left lower leg: Edema present.      Comments: +1 pitting to knee on the right, to mid anterior tib on the left   Skin:     General: Skin is warm.      Capillary Refill: Capillary refill takes less than 2 seconds.      Findings: No rash.      Comments: Chronic venous stasis changes on B/L LE   Neurological:      Mental Status: He is alert. Mental status is at baseline.             DO Justin [facility]  10/8/2024 1:27 PM

## 2024-10-09 ENCOUNTER — HOSPITAL ENCOUNTER (OUTPATIENT)
Dept: NON INVASIVE DIAGNOSTICS | Facility: HOSPITAL | Age: 69
Discharge: HOME | End: 2024-10-09
Attending: RADIOLOGY | Admitting: RADIOLOGY
Payer: MEDICARE

## 2024-10-09 DIAGNOSIS — M86.9 OSTEOMYELITIS, UNSPECIFIED SITE, UNSPECIFIED TYPE (HCC): ICD-10-CM

## 2024-10-09 PROCEDURE — 36589 REMOVAL TUNNELED CV CATH: CPT | Performed by: NURSE PRACTITIONER

## 2024-10-09 PROCEDURE — 36589 REMOVAL TUNNELED CV CATH: CPT

## 2024-10-09 RX ORDER — LIDOCAINE WITH 8.4% SOD BICARB 0.9%(10ML)
SYRINGE (ML) INJECTION AS NEEDED
Status: COMPLETED | OUTPATIENT
Start: 2024-10-09 | End: 2024-10-09

## 2024-10-09 RX ADMIN — Medication 7 ML: at 10:16

## 2024-10-09 NOTE — DISCHARGE INSTRUCTIONS
Perma-cath Removal   WHAT YOU NEED TO KNOW:   A perma-cath is a catheter placed through a vein into or near your right atrium. Your right atrium is the right upper chamber of your heart. A perma-cath is used for dialysis in an emergency or until a long-term device is ready to use. Or you no longer need dialysis.    DISCHARGE INSTRUCTIONS:   Call 911 for any of the following:   You feel lightheaded, short of breath, and have chest pain.    You start bleeding    Contact Interventional Radiology at [phone] ([facility] PATIENTS: Contact Interventional Radiology at [phone]) ([facility] PATIENTS: Contact Interventional Radiology at [phone]) if:  Blood soaks through your bandage.   You have new swelling in your arm, neck, face, or chest on your right side.  Your bruises or pain get worse.   You have a fever or chills.  Persistent nausea or vomiting.   Your incision is red, swollen, or draining pus.   You have questions or concerns about your condition or care.  Self-care:   Resume your normal diet. Small sips of flat soda will help with nausea.  Keep your dressings dry. Do not get your perma-cath site wet until the incision closes.  You may take a tub bath, but do not get your dressings wet. Water in your wound can cause bacteria to grow and cause an infection. If your dressing gets wet, remove the wet dressing and apply a dry bandaid. Keep it covered until the incision closes. This should only take a few days to heal. Do not use soaps or ointments.  Immediately after your catheter is removed, no strenuous activity for twenty four hours and stay in an upright sitting position for two hours.   Follow up with your healthcare provider as directed: Write down your questions so you remember to ask them during your visits.

## 2024-10-09 NOTE — BRIEF OP NOTE (RAD/CATH)
IR TUNNELED CENTRAL LINE REMOVAL Procedure Note    PATIENT NAME: Augustus Coffman  : 1955  MRN: [id number]    Pre-op Diagnosis:   1. Osteomyelitis, unspecified site, unspecified type (HCC)      Post-op Diagnosis:   1. Osteomyelitis, unspecified site, unspecified type (HCC)      Provider:   TANVIR Cummings    Assistants:   None     Estimated Blood Loss: Minimal     Findings: Removal of RIJ tunneled CVC. Steri-strip, DSD and Tegaderm applied.    Specimens: None     Complications:  None immediate     Anesthesia: local    TANVIR Cummings     Date: 10/9/2024  Time: 10:31 AM

## 2024-10-10 ENCOUNTER — OFFICE VISIT (OUTPATIENT)
Dept: WOUND CARE | Facility: CLINIC | Age: 69
End: 2024-10-10
Payer: MEDICARE

## 2024-10-10 VITALS
HEART RATE: 82 BPM | TEMPERATURE: 97.5 F | DIASTOLIC BLOOD PRESSURE: 99 MMHG | RESPIRATION RATE: 18 BRPM | SYSTOLIC BLOOD PRESSURE: 169 MMHG

## 2024-10-10 DIAGNOSIS — S81.801A OPEN LEG WOUND, RIGHT, INITIAL ENCOUNTER: ICD-10-CM

## 2024-10-10 DIAGNOSIS — E08.621 DIABETIC ULCER OF OTHER PART OF RIGHT FOOT ASSOCIATED WITH DIABETES MELLITUS DUE TO UNDERLYING CONDITION, WITH FAT LAYER EXPOSED (HCC): Primary | ICD-10-CM

## 2024-10-10 DIAGNOSIS — L97.512 DIABETIC ULCER OF OTHER PART OF RIGHT FOOT ASSOCIATED WITH DIABETES MELLITUS DUE TO UNDERLYING CONDITION, WITH FAT LAYER EXPOSED (HCC): Primary | ICD-10-CM

## 2024-10-10 PROCEDURE — 99213 OFFICE O/P EST LOW 20 MIN: CPT | Performed by: PODIATRIST

## 2024-10-10 RX ORDER — LIDOCAINE 40 MG/G
CREAM TOPICAL ONCE
Status: COMPLETED | OUTPATIENT
Start: 2024-10-10 | End: 2024-10-10

## 2024-10-10 RX ADMIN — LIDOCAINE: 40 CREAM TOPICAL at 15:33

## 2024-10-10 NOTE — PROGRESS NOTES
Wound Procedure Treatment Distal;Right Pretibial    Performed by: Maryana Bocanegra RN  Authorized by: Susie Luu DPM    Associated wounds:   Wound 10/10/24 Pretibial Distal;Right  Applied primary dressing:  Other  Applied secondary dressing:  Gauze and ABD  Dressing secured with:  Julisa and Tape    Xeroform

## 2024-10-10 NOTE — PATIENT INSTRUCTIONS
Orders Placed This Encounter   Procedures    Wound cleansing and dressings Diabetic Ulcer Right;Plantar;Posterior Foot     RIGHT FOOT WOUND     Wash your hands with soap and water. Remove old dressing, discard into plastic bag and place in trash. Cleanse the wound with Normal Saline prior to applying a clean dressing. Do not use tissue or cotton balls. Do not scrub the wound. Pat dry using gauze.     Shower: No     Apply Mepilex Up to the wound.   Cover with Gauze and ABD.   Secure with Julisa and Tape.     Change dressing every OTHER day.     Keep at weight off of right foot at all times     Standing Status:   Future     Standing Expiration Date:   10/17/2024    Wound cleansing and dressings Distal;Right Pretibial     RIGHT LEG WOUND    Wash your hands with soap and water.  Remove old dressing, discard into plastic bag and place in trash.  Cleanse the wound with mild soap and water prior to applying a clean dressing. Do not use tissue or cotton balls. Do not scrub the wound. Pat dry using gauze.    Apply Xeroform to the open wound.    Cover with Gauze.   Secure with Julisa and Tape.     Change dressing every other day.     Standing Status:   Future     Standing Expiration Date:   10/17/2024

## 2024-10-10 NOTE — PROGRESS NOTES
Patient ID: Augustus Coffman is a 69 y.o. male Date of Birth 1955       Chief Complaint   Patient presents with    Follow Up Wound Care Visit     RIGHT FOOT WOUND       Allergies:  Lisinopril    Diagnosis:  1. Diabetic ulcer of other part of right foot associated with diabetes mellitus due to underlying condition, with fat layer exposed (HCC)  -     lidocaine (LMX) 4 % cream  -     Wound cleansing and dressings Diabetic Ulcer Right;Plantar;Posterior Foot; Future  -     Wound cleansing and dressings Distal;Right Pretibial; Future  -     Wound Procedure Treatment Diabetic Ulcer Right;Plantar;Posterior Foot  2. Open leg wound, right, initial encounter  -     Wound cleansing and dressings Diabetic Ulcer Right;Plantar;Posterior Foot; Future  -     Wound cleansing and dressings Distal;Right Pretibial; Future  -     Wound Procedure Treatment Distal;Right Pretibial     Diagnosis ICD-10-CM Associated Orders   1. Diabetic ulcer of other part of right foot associated with diabetes mellitus due to underlying condition, with fat layer exposed ([facility])  E08.621 lidocaine (LMX) 4 % cream    L97.512 Wound cleansing and dressings Diabetic Ulcer Right;Plantar;Posterior Foot     Wound cleansing and dressings Distal;Right Pretibial     Wound Procedure Treatment Diabetic Ulcer Right;Plantar;Posterior Foot      2. Open leg wound, right, initial encounter  S81.801A Wound cleansing and dressings Diabetic Ulcer Right;Plantar;Posterior Foot     Wound cleansing and dressings Distal;Right Pretibial     Wound Procedure Treatment Distal;Right Pretibial           Assessment & Plan:  See wound orders.    Tissue test was negative on the right foot wound and no debridement was done.  Patient will present soon for orthotic fitting.  Continue local wound care.  Legs have edema but he has only been back on water pills for two days.  Patient will likely lose weight and see leg edema reduce over the next week.  Local wound care right leg.  Elevate  while seated.      Subjective:   The patient reports that he recently was placed back on a small dose of Lasix 20 mg daily for the leg swelling.  He notes that there are two blisters on his legs from the edema.        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HCC)    History of hypertension    Hypercholesteremia    Hammer toe of right foot    Stasis dermatitis of both legs    Diabetic peripheral neuropathy (HCC)    Gout    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HCC)    Obesity with body mass index 30 or greater    Type 2 diabetes mellitus (HCC)    Retroperitoneal hematoma    Mesenteric lymphadenopathy    Acute blood loss anemia    FINA (acute kidney injury) on CKD stage 2(HCC)    GI bleed    Pleural effusion    Chronic venous hypertension (idiopathic) with ulcer of right lower extremity (HCC)    Rash    Stage 2 chronic kidney disease    Hypertensive kidney disease with stage 3 chronic kidney disease (HCC)    Other proteinuria    Obesity, morbid (HCC)    Follicular lymphoma grade I of lymph nodes of multiple sites (HCC)    Vitamin D deficiency    Stage 3a chronic kidney disease (HCC)    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 (HCC)    Ectatic thoracic aorta (HCC)    Aortic stenosis with trileaflet valve    Sepsis without acute organ dysfunction (HCC)    Acute respiratory failure with hypoxia (HCC)    Acute hyponatremia    Chronic diastolic CHF (congestive heart failure) (HCC)    Encounter for deep vein thrombosis (DVT) prophylaxis    Hyponatremia    Neck pain    Acute osteomyelitis of cervical spine (HCC)    Transaminitis    Acute renal failure with oliguria  (HCC)    MSSA bacteremia    New onset a-fib (HCC)    Acute pericarditis    Diabetic ulcer of right foot (HCC)    Constipation    Pressure injury of deep tissue of buttock    Other specified anemias     Past Medical History:   Diagnosis Date    Arthritis  2010's    Occasionally flares up    Cancer (HCC) May 2021    Still investigating    Chronic kidney disease     Diabetes mellitus (HCC)     Follicular lymphoma (HCC)     GERD (gastroesophageal reflux disease) June 2021    Noticed in an Endoscopy    Heart murmur May 2020    High cholesterol     Hypertension     Kidney stone 1980's    Have had since 1980's    Obesity Since Childhood     Past Surgical History:   Procedure Laterality Date    BUNIONECTOMY Right 11/24/2020    Procedure: RIGHT HAV CORRECTION,;  Surgeon: Niranjan Child DPM;  [facility];  Service: Podiatry    COLONOSCOPY      INCISION AND DRAINAGE OF WOUND Right 10/05/2016    Procedure: INCISION AND DRAINAGE (I&D) EXTREMITY;  Surgeon: Niranjan Child DPM;  Location[facility];  Service:     IR BIOPSY LYMPH NODE  07/08/2021    IR EMBOLIZATION (SPECIFY VESSEL OR SITE)  07/08/2021    IR PORT PLACEMENT  9/1/2022    IR TUNNELED CENTRAL LINE PLACEMENT  8/29/2024    PILONIDAL CYST EXCISION      IA CORRECTION HAMMERTOE Right 02/19/2019    Procedure: THIRD HAMMER TOE CORRECTION;  Surgeon: Niranjan Child DPM;  [facility];  Service: Podiatry    IA RMVL MATEO CTR VAD W/SUBQ PORT/ CTR/PRPH INSJ N/A 3/14/2023    Procedure: REMOVAL VENOUS PORT (PORT-A-CATH)IR;  Surgeon: Avelino Vázquez DO;  [facility];  Service: Interventional Radiology    TOE OSTEOTOMY Right 03/14/2017    Procedure: HAMMERTOE CORRECTION R 2 ;  Surgeon: Niranjan Child DPM;  [facility];  Service:     TOE OSTEOTOMY Left 11/24/2020    Procedure: LEFT HT CORRECTION TOE;  Surgeon: Niranjan Child DPM;  [facility];  Service: Podiatry    TONSILLECTOMY  1963     Social History     Socioeconomic History    Marital status: /Civil Union     Spouse name: None    Number of children: None    Years of education: None    Highest education level: None   Occupational History    None   Tobacco Use    Smoking status: Never    Smokeless tobacco: Never    Tobacco comments:      Never smoked but exposed to second hand smoke from birth until 1980's   Vaping Use    Vaping status: Never Used   Substance and Sexual Activity    Alcohol use: Never    Drug use: Never    Sexual activity: Not Currently     Partners: Female     Birth control/protection: Abstinence   Other Topics Concern    None   Social History Narrative    None     Social Determinants of Health     Financial Resource Strain: Not on file   Food Insecurity: No Food Insecurity (9/3/2024)    Hunger Vital Sign     Worried About Running Out of Food in the Last Year: Never true     Ran Out of Food in the Last Year: Never true   Transportation Needs: No Transportation Needs (9/3/2024)    PRAPARE - Transportation     Lack of Transportation (Medical): No     Lack of Transportation (Non-Medical): No   Physical Activity: Not on file   Stress: Not on file   Social Connections: Unknown (6/18/2024)    Received from [employer]    Social RedCritter     How often do you feel lonely or isolated from those around you? (Adult - for ages 18 years and over): Not on file   Intimate Partner Violence: Not on file   Housing Stability: Low Risk  (9/3/2024)    Housing Stability Vital Sign     Unable to Pay for Housing in the Last Year: No     Number of Times Moved in the Last Year: 0     Homeless in the Last Year: No        Current Outpatient Medications:     acetaminophen (TYLENOL) 325 mg tablet, Take 3 tablets (975 mg total) by mouth every 8 (eight) hours as needed for mild pain or moderate pain (Patient not taking: Reported on 10/3/2024), Disp: , Rfl:     amiodarone 200 mg tablet, Take 1 tablet (200 mg total) by mouth daily, Disp: 30 tablet, Rfl: 0    apixaban (ELIQUIS) 5 mg, Take 1 tablet (5 mg total) by mouth 2 (two) times a day, Disp: 60 tablet, Rfl: 5    cefadroxil (DURICEF) 500 mg capsule, Take 1 capsule (500 mg total) by mouth every 12 (twelve) hours Do not start before October 3, 2024., Disp: 60 capsule, Rfl: 0    Cholecalciferol 100 MCG (4000 UT)  CAPS, Take 1.25 capsules (5,000 Units total) by mouth in the morning, Disp: , Rfl:     furosemide (LASIX) 20 mg tablet, Take 1 tablet (20 mg total) by mouth daily, Disp: 30 tablet, Rfl: 1    metFORMIN (GLUCOPHAGE) 500 mg tablet, TAKE 2 TABLETS BY MOUTH TWICE A DAY WITH FOOD, Disp: 360 tablet, Rfl: 1    methocarbamol (ROBAXIN) 750 mg tablet, Take 1 tablet (750 mg total) by mouth every 6 (six) hours as needed for muscle spasms for up to 14 days, Disp: 56 tablet, Rfl: 0    metoprolol tartrate (LOPRESSOR) 25 mg tablet, Take 1 tablet (25 mg total) by mouth every 12 (twelve) hours, Disp: 60 tablet, Rfl: 5    Multiple Vitamins-Minerals (Centrum Silver 50+Men) TABS, , Disp: , Rfl:     polyethylene glycol (MIRALAX) 17 g packet, Take 17 g by mouth daily (Patient taking differently: Take 17 g by mouth as needed), Disp: , Rfl:     rosuvastatin (CRESTOR) 40 MG tablet, Take 1 tablet (40 mg total) by mouth every evening, Disp: 90 tablet, Rfl: 1  No current facility-administered medications for this visit.  Family History   Problem Relation Age of Onset    Cancer Father         Leukemia      Review of Systems   Constitutional:  Negative for chills and fever.         Objective:  /99   Pulse 82   Temp 97.5 °F (36.4 °C)   Resp 18     Physical Exam  Feet:      Comments: Bilateral edema of the legs 2+.  New bulla left anterior skin and superficial wound right lower leg with bright red base, no active bleeding and skin flap.  Neurological:      Mental Status: He is alert.             Wound 09/07/23 Diabetic Ulcer Foot Right;Plantar;Posterior (Active)   Enter Singh score: Singh Grade 1: Partial or full-thickness ulcer (superficial) 10/03/24 1532   Wound Image   10/03/24 1537   Wound Description Pink 10/03/24 1532   Delisa-wound Assessment Dry;Fragile 10/03/24 1532   Wound Length (cm) 0.1 cm 10/03/24 1532   Wound Width (cm) 0.1 cm 10/03/24 1532   Wound Depth (cm) 0.1 cm 10/03/24 1532   Wound Surface Area (cm^2) 0.01 cm^2 10/03/24  1532   Wound Volume (cm^3) 0.001 cm^3 10/03/24 1532   Calculated Wound Volume (cm^3) 0 cm^3 10/03/24 1532   Change in Wound Size % 100 10/03/24 1532   Tunneling 1 in depth located at 1-12 o'clock 10/05/23 1348   Number of underminings 1 07/11/24 1346   Undermining 1 0.4 07/11/24 1346   Undermining 1 is depth extending from 7-11 07/11/24 1346   Drainage Amount Scant 10/03/24 1532   Drainage Description Serosanguineous 10/03/24 1532   Non-staged Wound Description Full thickness 10/03/24 1532   Dressing Status Intact 10/03/24 1532       Wound 09/03/24 Pressure Injury Sacrum (Active)       Wound 10/09/24 Incision Catheter entry/exit site Chest Right (Active)                         Procedures     Results from last 6 Months   Lab Units 07/29/24  0846   WOUND CULTURE  3+ Growth of Staphylococcus aureus*         Wound Instructions:  Orders Placed This Encounter   Procedures    Wound cleansing and dressings Diabetic Ulcer Right;Plantar;Posterior Foot     RIGHT FOOT WOUND     Wash your hands with soap and water. Remove old dressing, discard into plastic bag and place in trash. Cleanse the wound with Normal Saline prior to applying a clean dressing. Do not use tissue or cotton balls. Do not scrub the wound. Pat dry using gauze.     Shower: No     Apply Mepilex Up to the wound.   Cover with Gauze and ABD.   Secure with Julisa and Tape.     Change dressing every OTHER day.     Keep at weight off of right foot at all times     Standing Status:   Future     Standing Expiration Date:   10/17/2024    Wound cleansing and dressings Distal;Right Pretibial     RIGHT LEG WOUND    Wash your hands with soap and water.  Remove old dressing, discard into plastic bag and place in trash.  Cleanse the wound with mild soap and water prior to applying a clean dressing. Do not use tissue or cotton balls. Do not scrub the wound. Pat dry using gauze.    Apply Xeroform to the open wound.    Cover with Gauze.   Secure with Julisa and Tape.     Change  "dressing every other day.     Standing Status:   Future     Standing Expiration Date:   10/17/2024    Wound Procedure Treatment Diabetic Ulcer Right;Plantar;Posterior Foot     This order was created via procedure documentation    Wound Procedure Treatment Distal;Right Pretibial     This order was created via procedure documentation         Susie Luu DPM      Portions of the record may have been created with voice recognition software. Occasional wrong word or \"sound a like\" substitutions may have occurred due to the inherent limitations of voice recognition software. Read the chart carefully and recognize, using context, where substitutions have occurred.     "

## 2024-10-10 NOTE — PROGRESS NOTES
Wound Procedure Treatment Diabetic Ulcer Right;Plantar;Posterior Foot    Performed by: Maryana Bocanegar RN  Authorized by: Susie Luu DPM    Associated wounds:   Wound 09/07/23 Diabetic Ulcer Foot Right;Plantar;Posterior  Applied primary dressing:  Other    Mepilex UP

## 2024-10-11 ENCOUNTER — TELEPHONE (OUTPATIENT)
Age: 69
End: 2024-10-11

## 2024-10-11 DIAGNOSIS — R78.81 MSSA BACTEREMIA: ICD-10-CM

## 2024-10-11 DIAGNOSIS — B95.61 MSSA BACTEREMIA: ICD-10-CM

## 2024-10-11 DIAGNOSIS — M46.22 ACUTE OSTEOMYELITIS OF CERVICAL SPINE (HCC): ICD-10-CM

## 2024-10-11 RX ORDER — CEFADROXIL 500 MG/1
500 CAPSULE ORAL EVERY 12 HOURS SCHEDULED
Qty: 60 CAPSULE | Refills: 0 | Status: SHIPPED | OUTPATIENT
Start: 2024-10-11 | End: 2024-11-10

## 2024-10-11 NOTE — TELEPHONE ENCOUNTER
Contacted patient to reschedule visit.  Per Dr. Vergara, patient to obtain labs prior to the appointment. Radiology called patient to let him know that the MRI was postponed as they are behind. Patient r/s to Nov 1st, needs additional abx sent to pharmacy to ensure he has enough. He confirms he will obtain the blood work prior to that visit.

## 2024-10-11 NOTE — TELEPHONE ENCOUNTER
PT CALLED REQUESTING TO RESCHEDULE SNPX /HDM TO A DATE AFTER 10/31/2024. PT STATES HE HAS MANY DR APPTS THIS FALL AND WILL BE UNAVAILABLE:    11/15/2024  11/18/2024  11/20/2024  12/12/2024    PT STATES HE ALSO HAS A LONG COMMUTE AND WOULD APPRECIATE AN APPT LATER IN THE DAY IF POSSIBLE.    PLEASE CB PT TO DISCUSS SNPX RESCHEDULING OPTIONS    THANK YOU

## 2024-10-11 NOTE — TELEPHONE ENCOUNTER
Pt calling as he received a call to r/s his Mri to 10/31. Mri/labs needed for upcoming apt, he would like to know if its okay for him to r/s until after the scan. Pt is still on antibiotics, and planned to have his labs done this Monday    He states we can leave a detailed message if he does not answer

## 2024-10-15 ENCOUNTER — APPOINTMENT (OUTPATIENT)
Dept: LAB | Facility: HOSPITAL | Age: 69
End: 2024-10-15
Payer: MEDICARE

## 2024-10-15 DIAGNOSIS — I50.32 CHRONIC DIASTOLIC CHF (CONGESTIVE HEART FAILURE) (HCC): Chronic | ICD-10-CM

## 2024-10-15 DIAGNOSIS — R60.0 BILATERAL LOWER EXTREMITY EDEMA: ICD-10-CM

## 2024-10-15 DIAGNOSIS — N18.2 STAGE 2 CHRONIC KIDNEY DISEASE: ICD-10-CM

## 2024-10-15 DIAGNOSIS — N18.2 HYPERTENSIVE KIDNEY DISEASE WITH STAGE 2 CHRONIC KIDNEY DISEASE: ICD-10-CM

## 2024-10-15 DIAGNOSIS — I12.9 HYPERTENSIVE KIDNEY DISEASE WITH STAGE 2 CHRONIC KIDNEY DISEASE: ICD-10-CM

## 2024-10-15 DIAGNOSIS — R80.8 OTHER PROTEINURIA: ICD-10-CM

## 2024-10-15 DIAGNOSIS — E55.9 VITAMIN D DEFICIENCY: ICD-10-CM

## 2024-10-15 LAB
25(OH)D3 SERPL-MCNC: 50 NG/ML (ref 30–100)
ANION GAP SERPL CALCULATED.3IONS-SCNC: 8 MMOL/L (ref 4–13)
BACTERIA UR QL AUTO: ABNORMAL /HPF
BILIRUB UR QL STRIP: NEGATIVE
BUN SERPL-MCNC: 20 MG/DL (ref 5–25)
CALCIUM SERPL-MCNC: 9.6 MG/DL (ref 8.4–10.2)
CHLORIDE SERPL-SCNC: 102 MMOL/L (ref 96–108)
CLARITY UR: CLEAR
CO2 SERPL-SCNC: 31 MMOL/L (ref 21–32)
COLOR UR: YELLOW
CREAT SERPL-MCNC: 0.97 MG/DL (ref 0.6–1.3)
CREAT UR-MCNC: 68.4 MG/DL
GFR SERPL CREATININE-BSD FRML MDRD: 79 ML/MIN/1.73SQ M
GLUCOSE P FAST SERPL-MCNC: 110 MG/DL (ref 65–99)
GLUCOSE UR STRIP-MCNC: NEGATIVE MG/DL
HGB UR QL STRIP.AUTO: NEGATIVE
KETONES UR STRIP-MCNC: NEGATIVE MG/DL
LEUKOCYTE ESTERASE UR QL STRIP: NEGATIVE
MICROALBUMIN UR-MCNC: 891.1 MG/L
MICROALBUMIN/CREAT 24H UR: 1303 MG/G CREATININE (ref 0–30)
NITRITE UR QL STRIP: NEGATIVE
NON-SQ EPI CELLS URNS QL MICRO: ABNORMAL /HPF
PH UR STRIP.AUTO: 7.5 [PH]
POTASSIUM SERPL-SCNC: 4.8 MMOL/L (ref 3.5–5.3)
PROT UR STRIP-MCNC: ABNORMAL MG/DL
RBC #/AREA URNS AUTO: ABNORMAL /HPF
SODIUM SERPL-SCNC: 141 MMOL/L (ref 135–147)
SP GR UR STRIP.AUTO: 1.02 (ref 1–1.03)
UROBILINOGEN UR STRIP-ACNC: <2 MG/DL
WBC #/AREA URNS AUTO: ABNORMAL /HPF

## 2024-10-15 PROCEDURE — 82570 ASSAY OF URINE CREATININE: CPT

## 2024-10-15 PROCEDURE — 36415 COLL VENOUS BLD VENIPUNCTURE: CPT

## 2024-10-15 PROCEDURE — 81001 URINALYSIS AUTO W/SCOPE: CPT

## 2024-10-15 PROCEDURE — 80048 BASIC METABOLIC PNL TOTAL CA: CPT

## 2024-10-15 PROCEDURE — 82306 VITAMIN D 25 HYDROXY: CPT

## 2024-10-15 PROCEDURE — 82043 UR ALBUMIN QUANTITATIVE: CPT

## 2024-10-17 ENCOUNTER — TELEPHONE (OUTPATIENT)
Age: 69
End: 2024-10-17

## 2024-10-17 DIAGNOSIS — I10 PRIMARY HYPERTENSION: Primary | ICD-10-CM

## 2024-10-17 NOTE — TELEPHONE ENCOUNTER
Paul from [facility] called stating patient's bp is really high today at rest.  170/110.  Pt is not having any symptoms and does take bp meds.    Please advise

## 2024-10-18 RX ORDER — LOSARTAN POTASSIUM 25 MG/1
25 TABLET ORAL DAILY
Qty: 30 TABLET | Refills: 0 | Status: SHIPPED | OUTPATIENT
Start: 2024-10-18 | End: 2024-10-24 | Stop reason: SDUPTHER

## 2024-10-18 NOTE — TELEPHONE ENCOUNTER
Patient likely needs to restart some of his BP medications that were discontinued when in the hospital. He can increase his Metoprolol to 50 mg BID from 25 mg BID and I will add back his Losartan. Should monitor BP at home and follow up in the office in 2 weeks.     DO Justin Garza [facility]  10/18/2024 10:07 AM

## 2024-10-23 ENCOUNTER — PATIENT MESSAGE (OUTPATIENT)
Dept: FAMILY MEDICINE CLINIC | Facility: CLINIC | Age: 69
End: 2024-10-23

## 2024-10-23 DIAGNOSIS — I10 PRIMARY HYPERTENSION: ICD-10-CM

## 2024-10-23 DIAGNOSIS — I48.91 NEW ONSET A-FIB (HCC): ICD-10-CM

## 2024-10-24 ENCOUNTER — TELEPHONE (OUTPATIENT)
Age: 69
End: 2024-10-24

## 2024-10-24 ENCOUNTER — OFFICE VISIT (OUTPATIENT)
Dept: WOUND CARE | Facility: CLINIC | Age: 69
End: 2024-10-24
Payer: MEDICARE

## 2024-10-24 VITALS
SYSTOLIC BLOOD PRESSURE: 136 MMHG | DIASTOLIC BLOOD PRESSURE: 76 MMHG | RESPIRATION RATE: 18 BRPM | TEMPERATURE: 97.6 F | HEART RATE: 69 BPM

## 2024-10-24 DIAGNOSIS — L97.511 DIABETIC ULCER OF TOE OF RIGHT FOOT ASSOCIATED WITH DIABETES MELLITUS DUE TO UNDERLYING CONDITION, LIMITED TO BREAKDOWN OF SKIN (HCC): Primary | ICD-10-CM

## 2024-10-24 DIAGNOSIS — E08.621 DIABETIC ULCER OF TOE OF RIGHT FOOT ASSOCIATED WITH DIABETES MELLITUS DUE TO UNDERLYING CONDITION, LIMITED TO BREAKDOWN OF SKIN (HCC): Primary | ICD-10-CM

## 2024-10-24 DIAGNOSIS — M20.41 HAMMERTOE OF RIGHT FOOT: ICD-10-CM

## 2024-10-24 PROCEDURE — 99212 OFFICE O/P EST SF 10 MIN: CPT | Performed by: PODIATRIST

## 2024-10-24 RX ORDER — LOSARTAN POTASSIUM 25 MG/1
25 TABLET ORAL DAILY
Qty: 90 TABLET | Refills: 0 | Status: SHIPPED | OUTPATIENT
Start: 2024-10-24

## 2024-10-24 RX ORDER — AMIODARONE HYDROCHLORIDE 200 MG/1
200 TABLET ORAL DAILY
Qty: 90 TABLET | Refills: 0 | Status: SHIPPED | OUTPATIENT
Start: 2024-10-24

## 2024-10-24 RX ORDER — LIDOCAINE 40 MG/G
CREAM TOPICAL ONCE
Status: SHIPPED | OUTPATIENT
Start: 2024-10-24

## 2024-10-24 RX ORDER — METOPROLOL TARTRATE 25 MG/1
25 TABLET, FILM COATED ORAL EVERY 12 HOURS SCHEDULED
Qty: 180 TABLET | Refills: 0 | Status: SHIPPED | OUTPATIENT
Start: 2024-10-24

## 2024-10-24 NOTE — PROGRESS NOTES
Patient ID: Augustus Coffman is a 69 y.o. male Date of Birth 1955       Chief Complaint   Patient presents with    Follow Up Wound Care Visit     RIGHT FOOT WOUND       Allergies:  Lisinopril    Diagnosis:  1. Diabetic ulcer of toe of right foot associated with diabetes mellitus due to underlying condition, limited to breakdown of skin ([facility])  -     lidocaine (LMX) 4 % cream  -     Wound cleansing and dressings; Future  2. Hammertoe of right foot     Diagnosis ICD-10-CM Associated Orders   1. Diabetic ulcer of toe of right foot associated with diabetes mellitus due to underlying condition, limited to breakdown of skin ([facility])  E08.621 lidocaine (LMX) 4 % cream    L97.511 Wound cleansing and dressings      2. Hammertoe of right foot  M20.41            Assessment & Plan:  See wound orders.    The hammertoes and submet 1 area both have hemorrhagic calluses which are in essence Singh grade 1 ulcers.  The skin is intact but not normal.  The toes 2, 3 right foot may need flexor tenotomies to take the pressure off if shoes and orthotics do not help.  Also, the plantar wound will also need off-loading to prevent breakdown.  These will be ready in about 1-2 weeks.  He is discharged from the wound center but will be referred back in for any set backs.    Subjective:   This is a 68 yo male who presents with ulcers to the right foot and leg.  The patient has been taking water pills and the swelling and blisters have been coming down along with his weight.  BS in the 110-116 range.  The patient remains in the neck brace and is on oral antibiotics which is lowering his appetite.  Patient notes he will likely need neck surgery.  He keeps mepilex up on the wound and limits walking.        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[facility])    History of hypertension    Hypercholesteremia    Hammer toe of  right foot    Stasis dermatitis of both legs    Diabetic peripheral neuropathy (HCC)    Gout    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HCC)    Obesity with body mass index 30 or greater    Type 2 diabetes mellitus (HCC)    Retroperitoneal hematoma    Mesenteric lymphadenopathy    Acute blood loss anemia    FINA (acute kidney injury) on CKD stage 2(HCC)    GI bleed    Pleural effusion    Chronic venous hypertension (idiopathic) with ulcer of right lower extremity (HCC)    Rash    Stage 2 chronic kidney disease    Hypertensive kidney disease with stage 3 chronic kidney disease (HCC)    Other proteinuria    Obesity, morbid (HCC)    Follicular lymphoma grade I of lymph nodes of multiple sites (HCC)    Vitamin D deficiency    Stage 3a chronic kidney disease (HCC)    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 (HCC)    Ectatic thoracic aorta (HCC)    Aortic stenosis with trileaflet valve    Sepsis without acute organ dysfunction (HCC)    Acute respiratory failure with hypoxia (HCC)    Acute hyponatremia    Chronic diastolic CHF (congestive heart failure) (HCC)    Encounter for deep vein thrombosis (DVT) prophylaxis    Hyponatremia    Neck pain    Acute osteomyelitis of cervical spine (HCC)    Transaminitis    Acute renal failure with oliguria  (HCC)    MSSA bacteremia    New onset a-fib (HCC)    Acute pericarditis    Diabetic ulcer of right foot (HCC)    Constipation    Pressure injury of deep tissue of buttock    Other specified anemias     Past Medical History:   Diagnosis Date    Arthritis 2010's    Occasionally flares up    Cancer (HCC) May 2021    Still investigating    Chronic kidney disease     Diabetes mellitus (HCC)     Follicular lymphoma (HCC)     GERD (gastroesophageal reflux disease) June 2021    Noticed in an Endoscopy    Heart murmur May 2020    High cholesterol     Hypertension     Kidney stone 1980's    Have had since 1980's    Obesity Since Childhood     Past Surgical History:   Procedure Laterality Date    BUNIONECTOMY  Right 11/24/2020    Procedure: RIGHT HAV CORRECTION,;  Surgeon: Niranjan Child DPM;  [facility];  Service: Podiatry    COLONOSCOPY      INCISION AND DRAINAGE OF WOUND Right 10/05/2016    Procedure: INCISION AND DRAINAGE (I&D) EXTREMITY;  Surgeon: Niranjan Child DPM;  Location[facility];  Service:     IR BIOPSY LYMPH NODE  07/08/2021    IR EMBOLIZATION (SPECIFY VESSEL OR SITE)  07/08/2021    IR PORT PLACEMENT  9/1/2022    IR TUNNELED CENTRAL LINE PLACEMENT  8/29/2024    IR TUNNELED CENTRAL LINE REMOVAL  10/9/2024    PILONIDAL CYST EXCISION      FL CORRECTION HAMMERTOE Right 02/19/2019    Procedure: THIRD HAMMER TOE CORRECTION;  Surgeon: Niranjan Child DPM;  [facility];  Service: Podiatry    FL RMVL MATEO CTR VAD W/SUBQ PORT/ CTR/PRPH INSJ N/A 3/14/2023    Procedure: REMOVAL VENOUS PORT (PORT-A-CATH)IR;  Surgeon: Avelino Vázquez DO;  [facility];  Service: Interventional Radiology    TOE OSTEOTOMY Right 03/14/2017    Procedure: HAMMERTOE CORRECTION R 2 ;  Surgeon: Niranjan Child DPM;  [facility];  Service:     TOE OSTEOTOMY Left 11/24/2020    Procedure: LEFT HT CORRECTION TOE;  Surgeon: Niranjan Child DPM;  [facility];  Service: Podiatry    TONSILLECTOMY  1963     Social History     Socioeconomic History    Marital status: /Civil Union     Spouse name: None    Number of children: None    Years of education: None    Highest education level: None   Occupational History    None   Tobacco Use    Smoking status: Never    Smokeless tobacco: Never    Tobacco comments:     Never smoked but exposed to second hand smoke from birth until 1980's   Vaping Use    Vaping status: Never Used   Substance and Sexual Activity    Alcohol use: Never    Drug use: Never    Sexual activity: Not Currently     Partners: Female     Birth control/protection: Abstinence   Other Topics Concern    None   Social History Narrative    None     Social Determinants of Health     Financial Resource  Strain: Not on file   Food Insecurity: No Food Insecurity (9/3/2024)    Nursing - Inadequate Food Risk Classification     Worried About Running Out of Food in the Last Year: Never true     Ran Out of Food in the Last Year: Never true     Ran Out of Food in the Last Year: Not on file   Transportation Needs: No Transportation Needs (9/3/2024)    PRAPARE - Transportation     Lack of Transportation (Medical): No     Lack of Transportation (Non-Medical): No   Physical Activity: Not on file   Stress: Not on file   Social Connections: Unknown (6/18/2024)    Received from [facility]     How often do you feel lonely or isolated from those around you? (Adult - for ages 18 years and over): Not on file   Intimate Partner Violence: Not on file   Housing Stability: Low Risk  (9/3/2024)    Housing Stability Vital Sign     Unable to Pay for Housing in the Last Year: No     Number of Times Moved in the Last Year: 0     Homeless in the Last Year: No        Current Outpatient Medications:     acetaminophen (TYLENOL) 325 mg tablet, Take 3 tablets (975 mg total) by mouth every 8 (eight) hours as needed for mild pain or moderate pain (Patient not taking: Reported on 10/3/2024), Disp: , Rfl:     amiodarone 200 mg tablet, Take 1 tablet (200 mg total) by mouth daily, Disp: 90 tablet, Rfl: 0    apixaban (ELIQUIS) 5 mg, Take 1 tablet (5 mg total) by mouth 2 (two) times a day, Disp: 60 tablet, Rfl: 5    cefadroxil (DURICEF) 500 mg capsule, Take 1 capsule (500 mg total) by mouth every 12 (twelve) hours, Disp: 60 capsule, Rfl: 0    Cholecalciferol 100 MCG (4000 UT) CAPS, Take 1.25 capsules (5,000 Units total) by mouth in the morning, Disp: , Rfl:     furosemide (LASIX) 20 mg tablet, Take 1 tablet (20 mg total) by mouth daily, Disp: 30 tablet, Rfl: 1    losartan (COZAAR) 25 mg tablet, Take 1 tablet (25 mg total) by mouth daily, Disp: 90 tablet, Rfl: 0    metFORMIN (GLUCOPHAGE) 500 mg tablet, TAKE 2 TABLETS BY MOUTH TWICE A  DAY WITH FOOD, Disp: 360 tablet, Rfl: 1    methocarbamol (ROBAXIN) 750 mg tablet, Take 1 tablet (750 mg total) by mouth every 6 (six) hours as needed for muscle spasms for up to 14 days, Disp: 56 tablet, Rfl: 0    metoprolol tartrate (LOPRESSOR) 25 mg tablet, Take 1 tablet (25 mg total) by mouth every 12 (twelve) hours, Disp: 180 tablet, Rfl: 0    Multiple Vitamins-Minerals (Centrum Silver 50+Men) TABS, , Disp: , Rfl:     polyethylene glycol (MIRALAX) 17 g packet, Take 17 g by mouth daily (Patient taking differently: Take 17 g by mouth as needed), Disp: , Rfl:     rosuvastatin (CRESTOR) 40 MG tablet, Take 1 tablet (40 mg total) by mouth every evening, Disp: 90 tablet, Rfl: 1    Current Facility-Administered Medications:     lidocaine (LMX) 4 % cream, , Topical, Once, Susie Luu DPM  Family History   Problem Relation Age of Onset    Cancer Father         Leukemia      Review of Systems   Constitutional:  Negative for chills and fever.         Objective:  /76   Pulse 69   Temp 97.6 °F (36.4 °C)   Resp 18     Physical Exam  Neurological:      Mental Status: He is alert.             Wound 09/07/23 Diabetic Ulcer Foot Right;Plantar;Posterior (Active)   Enter Singh score: Singh Grade 1: Partial or full-thickness ulcer (superficial) 10/24/24 1328   Wound Image   10/24/24 1328   Wound Description Light purple 10/24/24 1328   Delisa-wound Assessment Callus 10/24/24 1328   Wound Length (cm) 0.1 cm 10/24/24 1328   Wound Width (cm) 0.1 cm 10/24/24 1328   Wound Depth (cm) 0.1 cm 10/24/24 1328   Wound Surface Area (cm^2) 0.01 cm^2 10/24/24 1328   Wound Volume (cm^3) 0.001 cm^3 10/24/24 1328   Calculated Wound Volume (cm^3) 0 cm^3 10/24/24 1328   Change in Wound Size % 100 10/24/24 1328   Tunneling 1 in depth located at 1-12 o'clock 10/05/23 1348   Number of underminings 1 07/11/24 1346   Undermining 1 0.4 07/11/24 1346   Undermining 1 is depth extending from 7-11 07/11/24 1346   Drainage Amount None  "10/24/24 1328   Drainage Description ARJUN 10/24/24 1328   Non-staged Wound Description Not applicable 10/24/24 1328   Dressing Status Intact 10/24/24 1328       Wound 09/03/24 Pressure Injury Sacrum (Active)       Wound 10/09/24 Incision Catheter entry/exit site Chest Right (Active)       Wound 10/10/24 Pretibial Distal;Right (Active)   Wound Image   10/24/24 1328   Wound Description Epithelialization;Pink 10/24/24 1328   Delisa-wound Assessment Edema 10/24/24 1328   Wound Length (cm) 0.1 cm 10/24/24 1328   Wound Width (cm) 0.1 cm 10/24/24 1328   Wound Depth (cm) 0.1 cm 10/24/24 1328   Wound Surface Area (cm^2) 0.01 cm^2 10/24/24 1328   Wound Volume (cm^3) 0.001 cm^3 10/24/24 1328   Calculated Wound Volume (cm^3) 0 cm^3 10/24/24 1328   Change in Wound Size % 100 10/24/24 1328   Drainage Amount None 10/24/24 1328   Drainage Description ARJUN 10/24/24 1328   Non-staged Wound Description Not applicable 10/24/24 1328   Dressing Status Intact 10/24/24 1328                         Procedures     Results from last 6 Months   Lab Units 07/29/24  0846   WOUND CULTURE  3+ Growth of Staphylococcus aureus*         Wound Instructions:  Orders Placed This Encounter   Procedures    Wound cleansing and dressings     Your RIGHT FOOT AND RIGHT LEG Wounds was HEALED today at the Wound Center - CONGRATULATIONS!!!    Continue to keep the area clean and intact.   Continue to use the Mepilex Up to offload the foot.     Continue to follow up with Dr. Luu in her office outpatient.     Thank you for using the Wound Care Center.     Standing Status:   Future     Standing Expiration Date:   10/31/2024         Susie Luu DPM      Portions of the record may have been created with voice recognition software. Occasional wrong word or \"sound a like\" substitutions may have occurred due to the inherent limitations of voice recognition software. Read the chart carefully and recognize, using context, where substitutions have occurred.   "

## 2024-10-24 NOTE — PATIENT INSTRUCTIONS
Orders Placed This Encounter   Procedures    Wound cleansing and dressings     Your RIGHT FOOT AND RIGHT LEG Wounds was HEALED today at the Wound Center - CONGRATULATIONS!!!    Continue to keep the area clean and intact.   Continue to use the Mepilex Up to offload the foot.     Continue to follow up with Dr. Luu in her office outpatient.     Thank you for using the Wound Care Center.     Standing Status:   Future     Standing Expiration Date:   10/31/2024

## 2024-10-28 NOTE — ASSESSMENT & PLAN NOTE
Lab Results   Component Value Date    EGFR 68 10/29/2024    EGFR 79 10/15/2024    EGFR 84 09/30/2024    CREATININE 1.10 10/29/2024    CREATININE 0.97 10/15/2024    CREATININE 0.98 09/30/2024   Renal function is improved.  No need to dose adjust the antibiotics

## 2024-10-28 NOTE — PROGRESS NOTES
Ambulatory Visit  Name: Augustus Coffman      : 1955      MRN: [id number]  Encounter Provider: Bao Vergara MD  Encounter Date: 2024   Encounter department: [facility]    Assessment & Plan  MSSA bacteremia  [place] to be primary right foot source with hardware present.  Complicated by cervical spine infection.  Transthoracic echocardiogram without valvular vegetation but limited study and too high risk to obtain LUCRECIA.  Bacteremia cleared.  Completed 6 weeks of intravenous cefazolin on 10/2/2024 and then transitioned to oral cefadroxil..  -Antibiotics as below  -Additional workup as below       Acute osteomyelitis of cervical spine (HCC)  C5-C6 discitis/osteomyelitis with epidural phlegmon/abscess.  Found on outpatient imaging.  As a complication of MSSA bacteremia.  Patient fortunately without any focal neurologic deficits.  No other lesions on MRI T and L-spine.  Repeat MRI C-spine now without any epidural phlegmon/abscess.  Only paravertebral abscess remains. Sedimentation rate increased to >130 on , but now has steadily decreased and was 32 on 10/29/2024.  CRP continues to downtrend from 52 > 31.3 > 12.8.  Most recent CRP 3.3 on 10/29/2024.  Repeat MRI 10/31/2024 with substantial improvement and resolution of the phlegmon.  -Continue cefadroxil until sedimentation rate normalizes or at least stabilizes  -Recheck CBC with differential, creatinine, sedimentation rate and CRP in 4 weeks  -Follow-up in 6 weeks       Acute pericarditis, unspecified type  Acute pericarditis with new pericardial effusion. Patient developed abrupt onset of chest pain with ST elevations. Troponins negative. Clinical picture consistent with acute pericarditis. Uncertain however if he had a purulent pericarditis.  Cardiology follow-up and repeat outpatient echo       Type 2 diabetes mellitus with stage 3 chronic kidney disease, without long-term current use of insulin, unspecified whether stage 3a  "or 3b CKD (HCC)  Reasonable control with hemoglobin A1c of 5.5.    Lab Results   Component Value Date    HGBA1C 5.5 10/08/2024            Stage 3a chronic kidney disease (HCC)  Lab Results   Component Value Date    EGFR 68 10/29/2024    EGFR 79 10/15/2024    EGFR 84 09/30/2024    CREATININE 1.10 10/29/2024    CREATININE 0.97 10/15/2024    CREATININE 0.98 09/30/2024   Renal function is improved.  No need to dose adjust the antibiotics         Obesity with body mass index 30 or greater  Continue to stress lifestyle modification as this is a risk factor for recurrent infection.     Follow-up with infectious diseases is in 6 weeks.    History of Present Illness     Augustus Coffman is a 69 y.o. male who presents here for follow-up of MSSA bacteremia complicated by cervical spine infection.  The patient's completed 6 weeks of intravenous cefazolin has been on oral cefadroxil.    History obtained from : patient  Review of Systems        Objective     /74   Pulse 69   Temp 97.6 °F (36.4 °C)   Resp 18   Ht 6' 3\" (1.905 m)   Wt 124 kg (274 lb)   SpO2 96%   BMI 34.25 kg/m²     Physical Exam    Objective:  Vitals:  [unfilled]  @TMAX(24)@Current: Temperature: 97.6 °F (36.4 °C)    Physical Exam:   General Appearance:  Alert, interactive, nontoxic, no acute distress.   Throat: Oropharynx moist without lesions.    Lungs:   Clear to auscultation bilaterally; no wheezes, rhonchi or rales; respirations unlabored   Heart:  RRR; no murmur, rub or gallop   Abdomen:   Soft, non-tender, non-distended, positive bowel sounds.     Extremities: No clubbing, cyanosis or edema   Skin: No new rashes or lesions. No draining wounds noted.     MRI cervical spine.  Improving C5-C6 osteodiscitis.  No epidural phlegmon seen.    Images personally reviewed by me in PACS and interpreted by me    Administrative Statements   I have spent a total time of 40 minutes in caring for this patient on the day of the visit/encounter including " Diagnostic results, Prognosis, Instructions for management, Importance of tx compliance, Impressions, Counseling / Coordination of care, Documenting in the medical record, Reviewing / ordering tests, medicine, procedures  , and Obtaining or reviewing history  .

## 2024-10-28 NOTE — ASSESSMENT & PLAN NOTE
Acute pericarditis with new pericardial effusion. Patient developed abrupt onset of chest pain with ST elevations. Troponins negative. Clinical picture consistent with acute pericarditis. Uncertain however if he had a purulent pericarditis.  Cardiology follow-up and repeat outpatient echo

## 2024-10-28 NOTE — ASSESSMENT & PLAN NOTE
Felt to be primary right foot source with hardware present.  Complicated by cervical spine infection.  Transthoracic echocardiogram without valvular vegetation but limited study and too high risk to obtain LUCRECIA.  Bacteremia cleared.  Completed 6 weeks of intravenous cefazolin on 10/2/2024 and then transitioned to oral cefadroxil..  -Antibiotics as below  -Additional workup as below

## 2024-10-28 NOTE — ASSESSMENT & PLAN NOTE
C5-C6 discitis/osteomyelitis with epidural phlegmon/abscess.  Found on outpatient imaging.  As a complication of MSSA bacteremia.  Patient fortunately without any focal neurologic deficits.  No other lesions on MRI T and L-spine.  Repeat MRI C-spine now without any epidural phlegmon/abscess.  Only paravertebral abscess remains. Sedimentation rate increased to >130 on 9/9, but now has steadily decreased and was 32 on 10/29/2024.  CRP continues to downtrend from 52 > 31.3 > 12.8.  Most recent CRP 3.3 on 10/29/2024.  Repeat MRI 10/31/2024 with substantial improvement and resolution of the phlegmon.  -Continue cefadroxil until sedimentation rate normalizes or at least stabilizes  -Recheck CBC with differential, creatinine, sedimentation rate and CRP in 4 weeks  -Follow-up in 6 weeks

## 2024-10-28 NOTE — ASSESSMENT & PLAN NOTE
Reasonable control with hemoglobin A1c of 5.5.    Lab Results   Component Value Date    HGBA1C 5.5 10/08/2024

## 2024-10-28 NOTE — ASSESSMENT & PLAN NOTE
Continue to stress lifestyle modification as this is a risk factor for recurrent infection.     Follow-up with infectious diseases is in 6 weeks.

## 2024-10-29 ENCOUNTER — APPOINTMENT (OUTPATIENT)
Dept: LAB | Facility: HOSPITAL | Age: 69
End: 2024-10-29
Payer: MEDICARE

## 2024-10-29 DIAGNOSIS — M46.22 ACUTE OSTEOMYELITIS OF CERVICAL SPINE (HCC): ICD-10-CM

## 2024-10-29 DIAGNOSIS — B95.61 MSSA BACTEREMIA: ICD-10-CM

## 2024-10-29 DIAGNOSIS — R78.81 MSSA BACTEREMIA: ICD-10-CM

## 2024-10-29 DIAGNOSIS — Z79.2 LONG TERM (CURRENT) USE OF ANTIBIOTICS: ICD-10-CM

## 2024-10-29 DIAGNOSIS — N18.9 CHRONIC KIDNEY DISEASE: ICD-10-CM

## 2024-10-29 LAB
BASOPHILS # BLD AUTO: 0.11 THOUSANDS/ΜL (ref 0–0.1)
BASOPHILS NFR BLD AUTO: 2 % (ref 0–1)
CREAT SERPL-MCNC: 1.1 MG/DL (ref 0.6–1.3)
CRP SERPL QL: 3.3 MG/L
EOSINOPHIL # BLD AUTO: 0.44 THOUSAND/ΜL (ref 0–0.61)
EOSINOPHIL NFR BLD AUTO: 6 % (ref 0–6)
ERYTHROCYTE [DISTWIDTH] IN BLOOD BY AUTOMATED COUNT: 16.1 % (ref 11.6–15.1)
ERYTHROCYTE [SEDIMENTATION RATE] IN BLOOD: 32 MM/HOUR (ref 0–19)
GFR SERPL CREATININE-BSD FRML MDRD: 68 ML/MIN/1.73SQ M
HCT VFR BLD AUTO: 41.8 % (ref 36.5–49.3)
HGB BLD-MCNC: 12.3 G/DL (ref 12–17)
IMM GRANULOCYTES # BLD AUTO: 0.02 THOUSAND/UL (ref 0–0.2)
IMM GRANULOCYTES NFR BLD AUTO: 0 % (ref 0–2)
LYMPHOCYTES # BLD AUTO: 0.8 THOUSANDS/ΜL (ref 0.6–4.47)
LYMPHOCYTES NFR BLD AUTO: 11 % (ref 14–44)
MCH RBC QN AUTO: 26.1 PG (ref 26.8–34.3)
MCHC RBC AUTO-ENTMCNC: 29.4 G/DL (ref 31.4–37.4)
MCV RBC AUTO: 89 FL (ref 82–98)
MONOCYTES # BLD AUTO: 0.62 THOUSAND/ΜL (ref 0.17–1.22)
MONOCYTES NFR BLD AUTO: 9 % (ref 4–12)
NEUTROPHILS # BLD AUTO: 5.32 THOUSANDS/ΜL (ref 1.85–7.62)
NEUTS SEG NFR BLD AUTO: 72 % (ref 43–75)
NRBC BLD AUTO-RTO: 0 /100 WBCS
PLATELET # BLD AUTO: 167 THOUSANDS/UL (ref 149–390)
PMV BLD AUTO: 9.9 FL (ref 8.9–12.7)
RBC # BLD AUTO: 4.71 MILLION/UL (ref 3.88–5.62)
WBC # BLD AUTO: 7.31 THOUSAND/UL (ref 4.31–10.16)

## 2024-10-29 PROCEDURE — 85025 COMPLETE CBC W/AUTO DIFF WBC: CPT

## 2024-10-29 PROCEDURE — 36415 COLL VENOUS BLD VENIPUNCTURE: CPT

## 2024-10-29 PROCEDURE — 82565 ASSAY OF CREATININE: CPT

## 2024-10-29 PROCEDURE — 86140 C-REACTIVE PROTEIN: CPT

## 2024-10-29 PROCEDURE — 85652 RBC SED RATE AUTOMATED: CPT

## 2024-10-30 DIAGNOSIS — R60.0 BILATERAL LOWER EXTREMITY EDEMA: ICD-10-CM

## 2024-10-31 ENCOUNTER — HOSPITAL ENCOUNTER (OUTPATIENT)
Dept: MRI IMAGING | Facility: HOSPITAL | Age: 69
End: 2024-10-31
Payer: MEDICARE

## 2024-10-31 DIAGNOSIS — M46.22 ACUTE OSTEOMYELITIS OF CERVICAL SPINE (HCC): ICD-10-CM

## 2024-10-31 PROCEDURE — A9585 GADOBUTROL INJECTION: HCPCS | Performed by: PHYSICIAN ASSISTANT

## 2024-10-31 PROCEDURE — 72156 MRI NECK SPINE W/O & W/DYE: CPT

## 2024-10-31 RX ORDER — GADOBUTROL 604.72 MG/ML
12 INJECTION INTRAVENOUS
Status: COMPLETED | OUTPATIENT
Start: 2024-10-31 | End: 2024-10-31

## 2024-10-31 RX ORDER — FUROSEMIDE 20 MG/1
20 TABLET ORAL DAILY
Qty: 90 TABLET | Refills: 1 | Status: SHIPPED | OUTPATIENT
Start: 2024-10-31

## 2024-10-31 RX ADMIN — GADOBUTROL 12 ML: 604.72 INJECTION INTRAVENOUS at 14:56

## 2024-11-01 ENCOUNTER — TELEPHONE (OUTPATIENT)
Dept: INFECTIOUS DISEASES | Facility: CLINIC | Age: 69
End: 2024-11-01

## 2024-11-01 ENCOUNTER — OFFICE VISIT (OUTPATIENT)
Dept: INFECTIOUS DISEASES | Facility: CLINIC | Age: 69
End: 2024-11-01
Payer: MEDICARE

## 2024-11-01 VITALS
HEART RATE: 69 BPM | OXYGEN SATURATION: 96 % | DIASTOLIC BLOOD PRESSURE: 74 MMHG | HEIGHT: 75 IN | BODY MASS INDEX: 34.07 KG/M2 | SYSTOLIC BLOOD PRESSURE: 140 MMHG | RESPIRATION RATE: 18 BRPM | TEMPERATURE: 97.6 F | WEIGHT: 274 LBS

## 2024-11-01 DIAGNOSIS — R78.81 MSSA BACTEREMIA: Primary | ICD-10-CM

## 2024-11-01 DIAGNOSIS — I30.9 ACUTE PERICARDITIS, UNSPECIFIED TYPE: ICD-10-CM

## 2024-11-01 DIAGNOSIS — E11.22 TYPE 2 DIABETES MELLITUS WITH STAGE 3 CHRONIC KIDNEY DISEASE, WITHOUT LONG-TERM CURRENT USE OF INSULIN, UNSPECIFIED WHETHER STAGE 3A OR 3B CKD (HCC): ICD-10-CM

## 2024-11-01 DIAGNOSIS — B95.61 MSSA BACTEREMIA: Primary | ICD-10-CM

## 2024-11-01 DIAGNOSIS — N18.30 TYPE 2 DIABETES MELLITUS WITH STAGE 3 CHRONIC KIDNEY DISEASE, WITHOUT LONG-TERM CURRENT USE OF INSULIN, UNSPECIFIED WHETHER STAGE 3A OR 3B CKD (HCC): ICD-10-CM

## 2024-11-01 DIAGNOSIS — N18.31 STAGE 3A CHRONIC KIDNEY DISEASE (HCC): ICD-10-CM

## 2024-11-01 DIAGNOSIS — M46.22 ACUTE OSTEOMYELITIS OF CERVICAL SPINE (HCC): ICD-10-CM

## 2024-11-01 DIAGNOSIS — E66.9 OBESITY WITH BODY MASS INDEX 30 OR GREATER: ICD-10-CM

## 2024-11-01 PROCEDURE — 99215 OFFICE O/P EST HI 40 MIN: CPT | Performed by: INTERNAL MEDICINE

## 2024-11-01 RX ORDER — CEFADROXIL 500 MG/1
500 CAPSULE ORAL EVERY 12 HOURS SCHEDULED
Qty: 60 CAPSULE | Refills: 0 | Status: SHIPPED | OUTPATIENT
Start: 2024-11-01 | End: 2024-12-01

## 2024-11-06 ENCOUNTER — HOSPITAL ENCOUNTER (OUTPATIENT)
Dept: RADIOLOGY | Facility: HOSPITAL | Age: 69
Discharge: HOME | End: 2024-11-06
Payer: MEDICARE

## 2024-11-06 DIAGNOSIS — M46.22 OSTEOMYELITIS OF CERVICAL VERTEBRA (HCC): ICD-10-CM

## 2024-11-06 PROCEDURE — 72040 X-RAY EXAM NECK SPINE 2-3 VW: CPT

## 2024-11-07 DIAGNOSIS — N18.30 TYPE 2 DIABETES MELLITUS WITH STAGE 3 CHRONIC KIDNEY DISEASE, UNSPECIFIED WHETHER LONG TERM INSULIN USE, UNSPECIFIED WHETHER STAGE 3A OR 3B CKD (HCC): ICD-10-CM

## 2024-11-07 DIAGNOSIS — E13.9 DIABETES 1.5, MANAGED AS TYPE 2 (HCC): ICD-10-CM

## 2024-11-07 DIAGNOSIS — N18.2 HYPERTENSIVE KIDNEY DISEASE WITH STAGE 2 CHRONIC KIDNEY DISEASE: ICD-10-CM

## 2024-11-07 DIAGNOSIS — E11.22 TYPE 2 DIABETES MELLITUS WITH STAGE 3 CHRONIC KIDNEY DISEASE, UNSPECIFIED WHETHER LONG TERM INSULIN USE, UNSPECIFIED WHETHER STAGE 3A OR 3B CKD (HCC): ICD-10-CM

## 2024-11-07 DIAGNOSIS — I12.9 HYPERTENSIVE KIDNEY DISEASE WITH STAGE 2 CHRONIC KIDNEY DISEASE: ICD-10-CM

## 2024-11-08 ENCOUNTER — OFFICE VISIT (OUTPATIENT)
Dept: NEUROSURGERY | Facility: CLINIC | Age: 69
End: 2024-11-08
Payer: MEDICARE

## 2024-11-08 VITALS
HEIGHT: 75 IN | HEART RATE: 72 BPM | OXYGEN SATURATION: 97 % | DIASTOLIC BLOOD PRESSURE: 82 MMHG | SYSTOLIC BLOOD PRESSURE: 140 MMHG | WEIGHT: 275 LBS | TEMPERATURE: 97.6 F | BODY MASS INDEX: 34.19 KG/M2

## 2024-11-08 DIAGNOSIS — M46.22 OSTEOMYELITIS OF CERVICAL SPINE (HCC): Primary | ICD-10-CM

## 2024-11-08 PROCEDURE — 99213 OFFICE O/P EST LOW 20 MIN: CPT | Performed by: PHYSICIAN ASSISTANT

## 2024-11-08 RX ORDER — ROSUVASTATIN CALCIUM 40 MG/1
40 TABLET, COATED ORAL EVERY EVENING
Qty: 90 TABLET | Refills: 1 | Status: SHIPPED | OUTPATIENT
Start: 2024-11-08

## 2024-11-08 NOTE — PROGRESS NOTES
Ambulatory Visit  Name: Augustus Coffman      : 1955      MRN: [id number]  Encounter Provider: Craig Robert Goldberg, MD  Encounter Date: 2024   Encounter department: [facility] [place]    Assessment & Plan  Osteomyelitis of cervical spine (HCC)  Patient is a 69 yrs old pleasant woman with PMHx of DM with peripheral neuropathy,  CKD, CHF , foot ulcer, stasis dermatitis, AS, Afib, Acute pericarditis, Follicular Lymphoma, Pleural effusion, Respiratory failure, Mesenteric neoplasm, GIB, C5-C6 OM/Diskitis. He is here today for 3 month follow up  Patient completed IV Abx, and currently on oral Duricef and following up with ID. His ESR and CRP is improving, clinically, he doesn't have much neck pain, Limited movement and some kyphotic deformity, wearing [place] Collar.  Patient had upright cervical spine xrays in brace. Images shows multilevel DJD, and splaying of space between C5-6 spinous processes and Flexion deformity Kyphotic angulation at this level.  Exam-Patient A&OX3, Rosa, strength 5/5 bilaterally, and sensation to LT intact bilaterally. DTR 2+ no clonus bilaterally. Sensation to LT intact throughout. Gait stable. Non tender on palpation of posterior of cervical spine. Gait stable. Wearing [place] collar.      Imagings:       See follow upright Cervical spine findings above.    MRI on 10/31/24 shows Overall improving C5-C6 osteodiscitis. Small epidural phlegmon not seen on the most recent prior exam, but this may have been secondary to motion artifact.    ESR=down from 130 to 32  CRP=down from 52 to 3.3    Plan:    Patient feeling better, except kyphotic angulation of cervical spine-continue monitoring with Flex-Ext Cervical spine XRAYS to evaluate stability.  Flexion-Extension Cervical spine xrays in 2-3 weeks  Advised to continue weaning brace as instructed  Continue Abx and follow up with ID  F/U in 2-3 weeks with completion of cervical spine x-rays  Call with  "questions or concerns.   Orders:    XR spine cervical complete 6+ vw flex/ext/obl; Future      History of Present Illness   See detailed discussion above    ROS personally reviewed and updated as follows:    Review of Systems   Constitutional: Negative.    HENT: Negative.  Negative for trouble swallowing.    Eyes: Negative.    Respiratory: Negative.     Cardiovascular: Negative.    Gastrointestinal: Negative.    Endocrine: Negative.    Genitourinary: Negative.  Negative for enuresis.   Musculoskeletal:  Positive for neck stiffness. Negative for gait problem (slow to walk), myalgias and neck pain.   Skin: Negative.    Allergic/Immunologic: Negative.    Neurological: Negative.  Negative for weakness and numbness.   Hematological:  Bruises/bleeds easily ([id number]).   Psychiatric/Behavioral:  Positive for sleep disturbance.    All other systems reviewed and are negative.    I have personally reviewed the MA's review of systems and made changes as necessary.      Objective     /82 (BP Location: Right arm, Patient Position: Sitting, Cuff Size: Adult)   Pulse 72   Temp 97.6 °F (36.4 °C) (Temporal)   Ht 6' 3\" (1.905 m)   Wt 125 kg (275 lb)   SpO2 97%   BMI 34.37 kg/m²     Physical Exam  Constitutional:       Appearance: He is obese.   Eyes:      Extraocular Movements: Extraocular movements intact.      Pupils: Pupils are equal, round, and reactive to light.   Neck:      Comments: Patient has limited ROM of cervical spine  Musculoskeletal:         General: Tenderness present.      Cervical back: Tenderness present.   Neurological:      General: No focal deficit present.      Mental Status: He is alert and oriented to person, place, and time.      GCS: GCS eye subscore is 4. GCS verbal subscore is 5. GCS motor subscore is 6.      Cranial Nerves: Cranial nerves 2-12 are intact.      Sensory: Sensation is intact.      Motor: Motor function is intact.      Deep Tendon Reflexes: Reflexes are normal and symmetric. "       Neurologic Exam     Mental Status   Oriented to person, place, and time.     Cranial Nerves   Cranial nerves II through XII intact.     CN III, IV, VI   Pupils are equal, round, and reactive to light.      I personally reviewed the following image studies in PACS and associated radiology reports: xray(s). My interpretation of the radiology images/reports is: XR cervical spine images reviewed with the patient,findings as described in the assessment section above.    Administrative Statements   I have spent a total time of 30 minutes in caring for this patient on the day of the visit/encounter including Diagnostic results, Prognosis, Risks and benefits of tx options, Instructions for management, Patient and family education, Importance of tx compliance, Risk factor reductions, Impressions, Counseling / Coordination of care, Documenting in the medical record, Reviewing / ordering tests, medicine, procedures  , and Obtaining or reviewing history  .

## 2024-11-08 NOTE — ASSESSMENT & PLAN NOTE
Contrast injection demonstrated existing bilateral nephrostomy tubes in appropriate position. Successful exchange of 8.5F MPDC bilateral nephrostomy tubes over wire, using fluoroscopy guidance. Positioning confirmed with contrast injection. Drains sutured in place. No complications. Full report to follow. Lab Results   Component Value Date    HGBA1C 6.8 (H) 07/05/2024       Recent Labs     07/29/24  0304   POCGLU 187*       Blood Sugar Average: Last 72 hrs:  (P) 187    Continue carbohydrate consistent diet, lispro insulin sliding scale qac + qhs, and POC glucose qac + qhs.

## 2024-11-10 NOTE — PROGRESS NOTES
Telemedicine consent    Patient: Augustus Coffman  Provider: Naomi Cummings MD  Provider located at [address] [facility] [address] [address]    The patient was identified by name and date of birth. Augustus Coffman was informed that this is a telemedicine visit and that the visit is being conducted through the Epic Embedded platform. He agrees to proceed..  My office door was closed. No one else was in the room.  He acknowledged consent and understanding of privacy and security of the video platform. The patient has agreed to participate and understands they can discontinue the visit at any time.    Patient is aware this is a billable service.                                 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 Follicular lymphoma of intra-abdominal lymph nodes, unspecified follicular lymphoma type (HCC)    Chief Complaint:    Follow up after chemotherapy treatments     Assessment/Plan:   1. Follicular Lymphoma, WHO grade 1-2, initially diagnosed 07/8/2021.  Patient was on observation and had no constitutional symptoms.  Recently, he developed complaint of chest pain likely related to epigastric pain with burning sensation.  He then began having discomfort in the abdomen in other areas.  Also began having diarrhea.  He had extensive workup from GI which was unrevealing.  GI believed he had symptoms related to his follicular lymphoma.  As a result, treatment was indicated.  Patient started BR treatment Q 28 days on September 6, 2022. 6 cycles given. Restaging PET/CT 2/20/2023 showed no metabolic activity.      Discussion of decision making    I personally reviewed the following lab  results, the image studies, pathology, other specialty/physicians consult notes and recommendations, and outside medical records from [facility]/other institutions. I had a lengthy discussion with the patient and shared the work-up findings. I spent 31 minutes reviewing the records (labs, clinician notes, outside records, medical history, ordering medicine/tests/procedures, interpreting the imaging/labs previously done) and coordination of care as well as direct time with the patient today, of which greater than 50% of the time was spent in counseling and coordination of care with the patient/family.    Plan/Labs  Restaging CT CAP w/c in 6 months with CMP, CBC  Cont rehab and recovery from recent MSSA bacteremia    Follow Up: 6 months (once a year in person in [place], virtual [facility])    All questions were answered to the patient's satisfaction during this encounter. The patient knows the contact information for our office and knows to reach out for any relevant concerns related to this encounter. They are to call for any temperature 100.4 or higher, new symptoms including but not restricted to shaking chills, decreased appetite, nausea, vomiting, diarrhea, increased fatigue, shortness of breath or chest pain, confusion, and not feeling the strength to come to the clinic. For all other listed problems and medical diagnosis in their chart - they are managed by PCP and/or other specialists, which the patient acknowledges. Thank you very much for your consultation and making us a part of this patient's care. We are continuing to follow closely with you. Please do not hesitate to reach out to me with any additional questions or concerns.     Naomi Cummings MD  Physician, Medical Oncology-Hematology    AJCC 8th Edition Cancer Stage :      Cancer Staging  No matching staging information was found for the patient.    Hematology/Oncology History:   - incidentally diagnosed while doing imaging for aortic aneurysm, showed  retroperitoneum and mesenteric lymphadenopathy, largest lymph node about 3 cm, status post biopsy showed follicular lymphoma; grade 1-2;   - 7/2021 :  Developed a big hematoma post biopsy.  Hospitalized.  Hemoglobin dropped to 7.8 then stable.  -  8/2021 : Hb recovered. PET showed moderate tumor burden. Does not fit GELF criteria for treatment; FLIPI score = 2, age +  Stage 3. PET CT showed low SUV. Decided to observe.   - Feb 28, 2022 US Head and neck:Patient's lump in the right submandibular region corresponds to an enlarged, heterogeneous submandibular lymph node measuring 1.8 x 1.1 x 1.9 cm (image 18.) Review of the prior PET/CT from 8/10/2021 demonstrate an enlarged, hypermetabolic node in this area, therefore this is likely part of the patient's known follicular lymphoma. Difficult to assess interval change because of the difference in imaging modality  - May 12, 2022, CT CAP w/o c with interval increase in mesenteric lymph nodes, new pulmonary nodules. Conglomerate kennedy mass at the root of the small bowel mesentery on image 82 of series 2 measures approximately 12.0 x 7.5 cm, increased from 8.9 x 6.3 cm when measured using similar technique on July 10, 2021.  Discrete upper retroperitoneal nodes are slightly increased from July 10, 2021, specifically an aortocaval node measuring 2.9 x 2.3 cm on image 74 of series 2, increased from 2.2 x 2.0 cm on prior exam and an anterior left periaortic node measuring 2.4 x 2.0 cm on image 76 of series 2 increased from 2.1 x 1.7 cm on prior examination    August 6, 2022, CT abd, pelvis w/o contrast:  8.6 cm low-attenuation focus within the spleen which is not present on CT examination of 5/12/2022. Differential considerations include splenic abscess or progression of patient's follicular lymphoma. This finding can be further evaluated with contrast-enhanced MR abdomen.  Progression of upper abdominal lymphadenopathy. Mesenteric and retroperitoneal lymph nodes kennedy burden  appears similar to 5/12/2022.  New 2.6 cm cystic focus immediately deep to the umbilicus likely to represent additional disease spread.    August 8, 2022, patient had an EGD showing erythema in antrum.  Small proximal 1 cm sliding hiatal hernia.  Otherwise unrevealing findings.  Biopsies were benign.  August 19, 2022, chronic hepatitis panel was negative.  September 6th, 2022 started cycle 1, day 1 of Bendamustine + Rituxan Q28 days and completed 6 cycles  2/20/2023 PET/CT: Retroperitoneal, iliac chain, and inguinal lymphadenopathy as described.  However, the lymph nodes are not significantly metabolically active on PET,  with uptake less than the mediastinal blood pool (Deauville score 2). Large low-attenuation area within the spleen measuring 3.5 x 3.9 cm measures smaller compared to the CT of 11/21/2022 and is not metabolically active on PET  8/21/2023: CT CAP w/c: No evidence of lymphoma in the chest. Previous nodules have resolved. Overall improved lymphadenopathy in the abdomen and pelvis, particularly in the spleen, mesentery and upper retroperitoneum. Pelvic lymphadenopathy is essentially unchanged. New nodular focus along the right seminal vesicle which may represent an enlarged lymph node. This may also be related to lymphoma, though given the overall stability/improvement in lymphadenopathy elsewhere, a distinct etiology, possibly related to the prostate, is not excluded  2/26/2024 CT CAP w/c: small 2 mm stone, iliac lymph node measuring 1 cm on the right side and left side right-sided pelvic sidewall lymph node measuring 1.2 cm, stable. Left common iliac lymph node measuring 1.3 cm, stable. Para-aortic lymph node measuring 1.1 cm, stable. Aortocaval lymph node measuring about 1.2 cm, stable. Portacaval lymph nodes are seen. Mesenteric lymph nodes are seen measuring about 6 to 7 mm  11/18/2024 CT CAP w/c:  Almost complete resolution of left effusion with resolution of pericardial effusion and stable right  pleural effusion.There is interval progression of retroperitoneal adenopathy with aortocaval node with a short axis of 17 mm series 2/133 compared to 12 mm on the previous exam. Aortocaval node on series 2, image 139 measures 15 mm compared to 10 mm. Multiple small nodes are present in the mesentery series 2/159, less than 1 cm in diameter but increased in number compared to the previous exam. There is also stranding of the mesenteric fat. Left common iliac node measures 13 mm on 2/179 compared to 10 mm on the previous exam. Small external iliac nodes are demonstrated with short axis of 9 mm on series 2 image 206 with enlargement of the left common femoral node measuring 12 mm on series 2 image 221 compared to 8 mm on the previous exam    History of Present Illiness:   Augustus Coffman is a 69 y.o. male with the above-noted HemOnc history who is here  to follow-up regarding history of follicular lymphoma.    Patient has history of GERD, chest pain likely related to GERD.  He also has been having decreased appetite causing some weight loss.  He still has sensation of upset stomach.  GI physicians spoke with my attending who believes his symptoms could be related to his follicular lymphoma.  Patient was started on BR treatment Q 28 days September 6, 2022.    Interval history   Having recovery from his neck infection. No night sweats, fatigue.Focusing on the shopping mart that will be the biggest in [place].    Denies F/C, N/V, SOB, CP, LH, HA, rash, itching, gen weakness, melena, hematuria, hematochezia, falls, diarrhea, or constipation      ROS: A 10-point of review of systems is obtained and other than the above is noncontributory.     Objective:   VITALS:   There were no vitals taken for this visit.    Weight at last visit: 215 lbs    Below not updated as this was a televisit    Physical EXAM:  General:  Alert, cooperative, no distress, appears stated age.   Head:  Normocephalic, without obvious abnormality,  atraumatic.   Eyes:  Conjunctivae/corneas clear. EOMs intact. No evidence of conjunctivitis     Throat: Lips, mucosa, and tongue normal.  No bleeding from mouth.   Neck: Supple, symmetrical, trachea midline    Lungs:    Respiratory effort easy, nonlabored    Heart:  Regular rate and rhythm, +S1, S2 .   Abdomen:   Soft, non-tender,nondistended.      Extremities:  Lymphatics: Extremities normal, atraumatic, no cyanosis or edema.   No cervical, axillary or inguinal adenopathy   Skin: Skin color, texture, turgor normal. No rashes.    Neurologic: AAO. No focal neuro deficits       Allergies   Allergen Reactions    Lisinopril Cough       Past Medical History:   Diagnosis Date    Arthritis 2010's    Occasionally flares up    Cancer (HCC) May 2021    Still investigating    Chronic kidney disease     Diabetes mellitus (HCC)     Follicular lymphoma (HCC)     GERD (gastroesophageal reflux disease) June 2021    Noticed in an Endoscopy    Heart murmur May 2020    High cholesterol     Hypertension     Kidney stone 1980's    Have had since 1980's    Obesity Since Childhood       Past Surgical History:   Procedure Laterality Date    BUNIONECTOMY Right 11/24/2020    Procedure: RIGHT HAV CORRECTION,;  Surgeon: Niranjan Child DPM;  [facility]: [facility];  Service: Podiatry    COLONOSCOPY      INCISION AND DRAINAGE OF WOUND Right 10/05/2016    Procedure: INCISION AND DRAINAGE (I&D) EXTREMITY;  Surgeon: Niranjan Child DPM;  Location[facility];  Service:     IR BIOPSY LYMPH NODE  07/08/2021    IR EMBOLIZATION (SPECIFY VESSEL OR SITE)  07/08/2021    IR PORT PLACEMENT  9/1/2022    IR TUNNELED CENTRAL LINE PLACEMENT  8/29/2024    IR TUNNELED CENTRAL LINE REMOVAL  10/9/2024    PILONIDAL CYST EXCISION      KY CORRECTION HAMMERTOE Right 02/19/2019    Procedure: THIRD HAMMER TOE CORRECTION;  Surgeon: Niranjan Child DPM;  [facility];  Service: Podiatry    KY RMVL MATEO CTR VAD W/SUBQ PORT/ CTR/PRPH INSJ N/A 3/14/2023    Procedure: REMOVAL  VENOUS PORT (PORT-A-CATH)IR;  Surgeon: Avelino Vázquez DO;  [facility];  Service: Interventional Radiology    TOE OSTEOTOMY Right 03/14/2017    Procedure: HAMMERTOE CORRECTION R 2 ;  Surgeon: Niranjan Child DPM;  [facility];  Service:     TOE OSTEOTOMY Left 11/24/2020    Procedure: LEFT HT CORRECTION TOE;  Surgeon: Niranjan Child DPM;  [facility];  Service: Podiatry    TONSILLECTOMY  1963       Family History   Problem Relation Age of Onset    Cancer Father         Leukemia       Social History     Socioeconomic History    Marital status: /Civil Union     Spouse name: Not on file    Number of children: Not on file    Years of education: Not on file    Highest education level: Not on file   Occupational History    Not on file   Tobacco Use    Smoking status: Never    Smokeless tobacco: Never    Tobacco comments:     Never smoked but exposed to second hand smoke from birth until 1980's   Vaping Use    Vaping status: Never Used   Substance and Sexual Activity    Alcohol use: Never    Drug use: Never    Sexual activity: Not Currently     Partners: Female     Birth control/protection: Abstinence   Other Topics Concern    Not on file   Social History Narrative    Not on file     Social Drivers of Health     Financial Resource Strain: Not on file   Food Insecurity: No Food Insecurity (9/3/2024)    Nursing - Inadequate Food Risk Classification     Worried About Running Out of Food in the Last Year: Never true     Ran Out of Food in the Last Year: Never true     Ran Out of Food in the Last Year: Not on file   Transportation Needs: No Transportation Needs (9/3/2024)    PRAPARE - Transportation     Lack of Transportation (Medical): No     Lack of Transportation (Non-Medical): No   Physical Activity: Not on file   Stress: Not on file   Social Connections: Unknown (6/18/2024)    Received from [facility]    Social [employer]     How often do you feel lonely or isolated from those around you?  (Adult - for ages 18 years and over): Not on file   Intimate Partner Violence: Not on file   Housing Stability: Low Risk  (9/3/2024)    Housing Stability Vital Sign     Unable to Pay for Housing in the Last Year: No     Number of Times Moved in the Last Year: 0     Homeless in the Last Year: No       Current Outpatient Medications   Medication Sig Dispense Refill    acetaminophen (TYLENOL) 325 mg tablet Take 3 tablets (975 mg total) by mouth every 8 (eight) hours as needed for mild pain or moderate pain      amiodarone 200 mg tablet Take 1 tablet (200 mg total) by mouth daily 90 tablet 0    apixaban (ELIQUIS) 5 mg Take 1 tablet (5 mg total) by mouth 2 (two) times a day 60 tablet 5    cefadroxil (DURICEF) 500 mg capsule Take 1 capsule (500 mg total) by mouth every 12 (twelve) hours 60 capsule 0    Cholecalciferol 100 MCG (4000 UT) CAPS Take 1.25 capsules (5,000 Units total) by mouth in the morning      furosemide (LASIX) 20 mg tablet TAKE 1 TABLET BY MOUTH EVERY DAY 90 tablet 1    losartan (COZAAR) 25 mg tablet Take 1 tablet (25 mg total) by mouth daily 90 tablet 1    metFORMIN (GLUCOPHAGE) 500 mg tablet TAKE 2 TABLETS BY MOUTH TWICE A DAY WITH FOOD 360 tablet 1    methocarbamol (ROBAXIN) 750 mg tablet Take 1 tablet (750 mg total) by mouth every 6 (six) hours as needed for muscle spasms for up to 14 days 56 tablet 0    metoprolol tartrate (LOPRESSOR) 25 mg tablet Take 1 tablet (25 mg total) by mouth every 12 (twelve) hours (Patient taking differently: Take 25 mg by mouth every 12 (twelve) hours 2 tabs every 12 hours) 180 tablet 0    Multiple Vitamins-Minerals (Centrum Silver 50+Men) TABS       polyethylene glycol (MIRALAX) 17 g packet Take 17 g by mouth daily      rosuvastatin (CRESTOR) 40 MG tablet TAKE 1 TABLET BY MOUTH EVERY DAY IN THE EVENING 90 tablet 1     Current Facility-Administered Medications   Medication Dose Route Frequency Provider Last Rate Last Admin    lidocaine (LMX) 4 % cream   Topical Once  Susie Luu DPM           (Not in a hospital admission)      DATA REVIEW:    Pathology Result:    Final Diagnosis   Date Value Ref Range Status   08/15/2022   Final    A. Stomach, linear antral erythema:  - Gastric antral mucosa with mild chronic active gastritis and regenerative epithelial changes.  - No Helicobacter pylori organisms are identified with immunoperoxidase stain.  - Negative for intestinal metaplasia, dysplasia or carcinoma.     B. Esophagus, irregular z line 39:  - Squamocolumnar junction mucosa with mild chronic inflammation and regenerative epithelial changes.  - No intestinal metaplasia/ dysplasia identified.    C. Esophagogastric junction, small nodule ge junction:  - Fragments of esophageal squamous and gastric glandular mucosa with intestinal metaplasia and regenerative epithelial changes.  - Intestinal metaplasia identified with Alcian blue/PAS stain.  - Pan keratin stain highlights benign epithelial elements.  - No dysplasia identified. See note.    Note (C): This biopsy shows gastric-type mucosa with scattered goblet cells.  The diagnosis in this case depends on the location of this biopsy.  If this biopsy was taken from the tubular esophagus at least 1 cm above the gastric folds, it represents Ozuna mucosa of the distinctive type.  If this biopsy was taken from the gastric cardia, it represents intestinal metaplasia of the gastric cardia.     Reference: Justin NJ, Bon GW, Katie DG, Jaciel LB; American College of Gastroenterology.  AGC Clinical Guideline: Diagnosis and Management of Ozuna's Esophagus. Am J Gastroenterol. 2016 Ej;111(6)30-50.     07/08/2021   Final    A. Lymph node, periaortic, biopsy:  -  Follicular lymphoma, WHO grade 1-2 (of 3) (see note)       06/28/2021   Final    A. Duodenum:  - Small bowel mucosa with no significant histopathologic abnormality.  - Negative for malabsorption pattern.  - Negative for malignancy.     B. Stomach:  - Gastric mucosa with  no significant histopathologic abnormality.  - Negative for H. pylori by routine H&E.  - Negative for malignancy.     C. Esophagus, distal:  - Squamocolumnar mucosa with intestinal metaplasia.  See comment.  - Negative for dysplasia and malignancy.    Comment: The diagnosis in this case depends on the location of this biopsy.  If this biopsy was taken from the tubular esophagus at least 1 cm above the gastric folds, it represents Ozuna mucosa.  If this biopsy was taken from the gastric cardia, it represents intestinal metaplasia of the gastric cardia.          Image Results:  They are reviewed and documented in Hematology/Oncology history    CT chest abdomen pelvis w contrast  Narrative: CT CHEST, ABDOMEN AND PELVIS WITH IV CONTRAST    INDICATION: C82.08: Follicular lymphoma grade I, lymph nodes of multiple sites.    COMPARISON: CT chest abdomen and pelvis August 26, 2024, MRI of thoracic and lumbar spine August 27, 2024, February 26, 2024, August 21, 2023, PET/CT February 20, 2023    TECHNIQUE: CT examination of the chest, abdomen and pelvis was performed.  In addition to portal venous phase postcontrast scanning through the abdomen and pelvis, delayed phase postcontrast scanning was performed through the upper abdominal viscera.   Multiplanar 2D reformatted images were created from the source data.    This examination, like all CT scans performed in the [facility], was performed utilizing techniques to minimize radiation dose exposure, including the use of iterative reconstruction and automated exposure control. Radiation dose length   product (DLP) for this visit: 3008 mGy-cm    IV Contrast: 100 mL of iohexol (OMNIPAQUE)  Enteric Contrast: Not administered.    FINDINGS:    CHEST    LUNGS: Linear scarring in the right upper lobe.  Right upper lobe granulomata. Right lower lobe passive atelectasis.  No tracheal or endobronchial lesion.    PLEURA: Moderate size right effusion is stable.  Trace  left pleural effusion markedly improved.    HEART/GREAT VESSELS: Heart is unremarkable for patient's age.  Pericardial effusion has resolved.    Fusiform aneurysmal enlargement of the ascending thoracic aorta measuring up to 46 mm. Recommendation is for cardiothoracic surgery consultation. Extensive coronary artery calcifications.    MEDIASTINUM AND GABRIEL: Unremarkable.    CHEST WALL AND LOWER NECK: Mild gynecomastia.    ABDOMEN    LIVER/BILIARY TREE: Unremarkable.    GALLBLADDER: Cholelithiasis without findings of acute cholecystitis.    SPLEEN: Low-attenuation along the posterior medial aspect of the spleen series 2 image 84, difficult to assess due to streak artifact. Accessory spleen.    PANCREAS: Unremarkable.    ADRENAL GLANDS: Unremarkable.    KIDNEYS/URETERS: There is a hypodense lesion with wall calcification in the lower pole of the right kidney measuring 16 mm series 2 image 173. Density is higher than simple contrast and a punctate wall calcification is noted.  Punctate nonobstructing calculus lower pole left kidney.  No hydronephrosis.    STOMACH AND BOWEL: Stomach is unremarkable.  Small bowel loops show normal caliber.  There is large amount of stool in the colon.    APPENDIX: No findings to suggest appendicitis.    ABDOMINOPELVIC CAVITY: No ascites. No pneumoperitoneum.    There is interval progression of retroperitoneal adenopathy with aortocaval node with a short axis of 17 mm series 2/133 compared to 12 mm on the previous exam.  Aortocaval node on series 2, image 139 measures 15 mm compared to 10 mm.  Multiple small nodes are present in the mesentery series 2/159, less than 1 cm in diameter but increased in number compared to the previous exam. There is also stranding of the mesenteric fat.    Left common iliac node measures 13 mm on 2/179 compared to 10 mm on the previous exam.  Small external iliac nodes are demonstrated with short axis of 9 mm on series 2 image 206 with enlargement of the left  common femoral node measuring 12 mm on series 2 image 221 compared to 8 mm on the previous exam.    Mild enlargement of right common femoral node is seen measuring 9 mm currently, barely perceptible on the previous exam.  Surgical clips in the left retroperitoneum.    VESSELS: Atherosclerosis without abdominal aortic aneurysm.    PELVIS    REPRODUCTIVE ORGANS: Unremarkable for patient's age.    URINARY BLADDER: Unremarkable.    ABDOMINAL WALL/INGUINAL REGIONS: Fat-containing inguinal hernias.    BONES: No acute fracture or suspicious osseous lesion.  Spinal degenerative changes.  There is stable sclerosis at the left sacroiliac joint with focus of bony sclerosis in the left iliac bone series 2 image 198.  Bilateral osteoarthritis of the hips.  Impression: 1. Interval progression of retroperitoneal, pelvic and mesenteric adenopathy with mild increase in size of lymph nodes as described above. Consider follow-up PET/CT to assess if nodes are hypermetabolic.  2. Almost complete resolution of left effusion with resolution of pericardial effusion and stable right pleural effusion.  3. Aneurysmal dilatation of the ascending thoracic aorta measuring 46 mm. Surgical consultation recommended.  4. Slightly complex cystic lesion at the lower pole of the right kidney with a punctate wall calcification. Consider correlation with renal ultrasound or contrast-enhanced MRI.  The study was marked in EPIC for significant notification.    Workstation performed: [id number]        LABS:  Lab data are reviewed and documented in HemOnc history.     No results found for this or any previous visit (from the past 48 hours).          Naomi Cummings  11/20/2024, 1:26 PM

## 2024-11-11 ENCOUNTER — RA CDI HCC (OUTPATIENT)
Dept: OTHER | Facility: HOSPITAL | Age: 69
End: 2024-11-11

## 2024-11-12 DIAGNOSIS — I10 PRIMARY HYPERTENSION: ICD-10-CM

## 2024-11-13 RX ORDER — LOSARTAN POTASSIUM 25 MG/1
25 TABLET ORAL DAILY
Qty: 90 TABLET | Refills: 1 | Status: SHIPPED | OUTPATIENT
Start: 2024-11-13

## 2024-11-15 ENCOUNTER — OFFICE VISIT (OUTPATIENT)
Dept: CARDIOLOGY CLINIC | Facility: CLINIC | Age: 69
End: 2024-11-15
Payer: MEDICARE

## 2024-11-15 VITALS
BODY MASS INDEX: 34.07 KG/M2 | OXYGEN SATURATION: 96 % | HEART RATE: 81 BPM | HEIGHT: 75 IN | WEIGHT: 274 LBS | SYSTOLIC BLOOD PRESSURE: 148 MMHG | DIASTOLIC BLOOD PRESSURE: 84 MMHG | RESPIRATION RATE: 16 BRPM

## 2024-11-15 DIAGNOSIS — I71.21 ANEURYSM OF ASCENDING AORTA WITHOUT RUPTURE (HCC): ICD-10-CM

## 2024-11-15 DIAGNOSIS — I48.0 PAF (PAROXYSMAL ATRIAL FIBRILLATION) (HCC): Primary | ICD-10-CM

## 2024-11-15 DIAGNOSIS — I35.0 AORTIC STENOSIS WITH TRILEAFLET VALVE: ICD-10-CM

## 2024-11-15 DIAGNOSIS — E78.00 HYPERCHOLESTEREMIA: ICD-10-CM

## 2024-11-15 DIAGNOSIS — I31.39 PERICARDIAL EFFUSION: ICD-10-CM

## 2024-11-15 PROCEDURE — 93000 ELECTROCARDIOGRAM COMPLETE: CPT

## 2024-11-15 PROCEDURE — 99214 OFFICE O/P EST MOD 30 MIN: CPT

## 2024-11-15 NOTE — PROGRESS NOTES
[facility] [place]  [address]  [place] [address]  Cardiology Office Note    Augustus Coffman 69 y.o. male MRN: [id number]    11/16/24          Assessment/Plan:  Assessment & Plan  PAF (paroxysmal atrial fibrillation) (HCC)  Patient maintaining sinus rhythm  [id number]  Rate/Rhythm control: Amiodarone taper, losartan, metoprolol tartrate  AC: Eliquis 5 mg twice daily    Pericardial effusion  Repeat limited TTE for evaluation  Hypercholesteremia  Continue rosuvastatin 40 mg daily  Aortic stenosis with trileaflet valve  Moderate on last TTE.  Aneurysm of ascending aorta without rupture (HCC)  4.5 cm fusiform dilation of the ascending aorta; stable         Follow up: 3 months or sooner as needed  All questions and concerns addressed.  Patient was advised to report any problems requiring medical attention.          1. PAF (paroxysmal atrial fibrillation) (HCC)  POCT ECG      2. Pericardial effusion  Echo follow up/limited w/ contrast if indicated      3. Hypercholesteremia        4. Aortic stenosis with trileaflet valve        5. Aneurysm of ascending aorta without rupture (HCC)            HPI: Augustus Coffman is a 69 y.o. year old male with PMH of MSSA bacteremia, cervical discitis, atrial fibrillation and pericardial effusion who presents for hospital follow-up.     Patient was admitted to [facility] on 8/20/2024 for known MSSA bacteremia.  Patient had cervical discitis and was treated with antibiotics.  Patient but developed new onset atrial fibrillation as well, and found to have a pericardial effusion.  Patient was transferred to [facility] for thoracic surgery evaluation due to concern for purulent pericarditis.    He did not end up needing a surgical intervention for his pericardial effusion. He was treated with IV antibiotice inpatient. He was discharged to [facility] briefly for rehab and patient was discharged after 6 days.    Patient notes that he has  been doing well since discharge. Patient's appetite is improving. Patient completed IV antibiotics and the oral antibiotics. He is still wearing a cervical collar due to the discitis. The plan regarding surgical intervention to his neck is still to be determined at this time.    Patient has 4.5 fusiform dilation of the ascending aorta. This was last evaluated on 8/26/24.     Patient denies any chest pain, shortness of breath, palpitations, or lower extremity edema.    Patient has been maintaining sinus rhythm since discharge. He is on 3 months of amiodarone therapy.     Patient was instructed to call the office or seek medical attention if any significant chest pain, shortness of breath, palpitations, or lower extremity swelling develop. All medications reviewed and patient is tolerating medications without side effects.     Social history:   Patient denies tobacco, significant alcohol, or recreational drug use.    EKG- sinus rhythm with 1st degree AVB; nonspecific twave abnormality        Patient Active Problem List   Diagnosis    Diabetes 1.5, managed as type 2 (HCC)    History of hypertension    Hypercholesteremia    Hammer toe of right foot    Stasis dermatitis of both legs    Diabetic peripheral neuropathy (HCC)    Gout    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HCC)    Obesity with body mass index 30 or greater    Type 2 diabetes mellitus (HCC)    Retroperitoneal hematoma    Mesenteric lymphadenopathy    Acute blood loss anemia    FINA (acute kidney injury) on CKD stage 2(HCC)    GI bleed    Pleural effusion    Chronic venous hypertension (idiopathic) with ulcer of right lower extremity (HCC)    Rash    Stage 2 chronic kidney disease    Hypertensive kidney disease with stage 3 chronic kidney disease (HCC)    Other proteinuria    Obesity, morbid (HCC)    Follicular lymphoma grade I of lymph nodes of multiple sites (HCC)    Vitamin D deficiency    Stage 3a chronic kidney disease (HCC)    Type 2 diabetes mellitus with  diabetic chronic kidney disease (HCC)    Ectatic thoracic aorta (HCC)    Aortic stenosis with trileaflet valve    Sepsis without acute organ dysfunction (HCC)    Acute respiratory failure with hypoxia (HCC)    Acute hyponatremia    Chronic diastolic CHF (congestive heart failure) (HCC)    Encounter for deep vein thrombosis (DVT) prophylaxis    Hyponatremia    Neck pain    Acute osteomyelitis of cervical spine (HCC)    Transaminitis    Acute renal failure with oliguria  (HCC)    MSSA bacteremia    New onset a-fib (HCC)    Acute pericarditis    Diabetic ulcer of right foot (HCC)    Constipation    Pressure injury of deep tissue of buttock    Other specified anemias    Aneurysm of ascending aorta without rupture (HCC)       Allergies   Allergen Reactions    Lisinopril Cough         Current Outpatient Medications:     acetaminophen (TYLENOL) 325 mg tablet, Take 3 tablets (975 mg total) by mouth every 8 (eight) hours as needed for mild pain or moderate pain, Disp: , Rfl:     amiodarone 200 mg tablet, Take 1 tablet (200 mg total) by mouth daily, Disp: 90 tablet, Rfl: 0    apixaban (ELIQUIS) 5 mg, Take 1 tablet (5 mg total) by mouth 2 (two) times a day, Disp: 60 tablet, Rfl: 5    cefadroxil (DURICEF) 500 mg capsule, Take 1 capsule (500 mg total) by mouth every 12 (twelve) hours, Disp: 60 capsule, Rfl: 0    Cholecalciferol 100 MCG (4000 UT) CAPS, Take 1.25 capsules (5,000 Units total) by mouth in the morning, Disp: , Rfl:     furosemide (LASIX) 20 mg tablet, TAKE 1 TABLET BY MOUTH EVERY DAY, Disp: 90 tablet, Rfl: 1    losartan (COZAAR) 25 mg tablet, Take 1 tablet (25 mg total) by mouth daily, Disp: 90 tablet, Rfl: 1    metFORMIN (GLUCOPHAGE) 500 mg tablet, TAKE 2 TABLETS BY MOUTH TWICE A DAY WITH FOOD, Disp: 360 tablet, Rfl: 1    methocarbamol (ROBAXIN) 750 mg tablet, Take 1 tablet (750 mg total) by mouth every 6 (six) hours as needed for muscle spasms for up to 14 days, Disp: 56 tablet, Rfl: 0    metoprolol tartrate  (LOPRESSOR) 25 mg tablet, Take 1 tablet (25 mg total) by mouth every 12 (twelve) hours (Patient taking differently: Take 25 mg by mouth every 12 (twelve) hours 2 tabs every 12 hours), Disp: 180 tablet, Rfl: 0    Multiple Vitamins-Minerals (Centrum Silver 50+Men) TABS, , Disp: , Rfl:     polyethylene glycol (MIRALAX) 17 g packet, Take 17 g by mouth daily, Disp: , Rfl:     rosuvastatin (CRESTOR) 40 MG tablet, TAKE 1 TABLET BY MOUTH EVERY DAY IN THE EVENING, Disp: 90 tablet, Rfl: 1    Current Facility-Administered Medications:     lidocaine (LMX) 4 % cream, , Topical, Once, Susie Luu DPM    Past Medical History:   Diagnosis Date    Arthritis 2010's    Occasionally flares up    Cancer (HCC) May 2021    Still investigating    Chronic kidney disease     Diabetes mellitus (HCC)     Follicular lymphoma (HCC)     GERD (gastroesophageal reflux disease) June 2021    Noticed in an Endoscopy    Heart murmur May 2020    High cholesterol     Hypertension     Kidney stone 1980's    Have had since 1980's    Obesity Since Childhood       Family History   Problem Relation Age of Onset    Cancer Father         Leukemia       Past Surgical History:   Procedure Laterality Date    BUNIONECTOMY Right 11/24/2020    Procedure: RIGHT HAV CORRECTION,;  Surgeon: Niranjan Child DPM;  [facility];  Service: Podiatry    COLONOSCOPY      INCISION AND DRAINAGE OF WOUND Right 10/05/2016    Procedure: INCISION AND DRAINAGE (I&D) EXTREMITY;  Surgeon: Niranjan Child DPM;  Location: [facility];  Service:     IR BIOPSY LYMPH NODE  07/08/2021    IR EMBOLIZATION (SPECIFY VESSEL OR SITE)  07/08/2021    IR PORT PLACEMENT  9/1/2022    IR TUNNELED CENTRAL LINE PLACEMENT  8/29/2024    IR TUNNELED CENTRAL LINE REMOVAL  10/9/2024    PILONIDAL CYST EXCISION      VA CORRECTION HAMMERTOE Right 02/19/2019    Procedure: THIRD HAMMER TOE CORRECTION;  Surgeon: Niranjan Child DPM;  Location: [facility];  Service: Podiatry    VA RMVL MATEO CTR VAD W/SUBQ  PORT/ CTR/PRPH INSJ N/A 3/14/2023    Procedure: REMOVAL VENOUS PORT (PORT-A-CATH)IR;  Surgeon: Avelino Vázquez DO;  [facility];  Service: Interventional Radiology    TOE OSTEOTOMY Right 03/14/2017    Procedure: HAMMERTOE CORRECTION R 2 ;  Surgeon: Niranjan Child DPM;  [facility];  Service:     TOE OSTEOTOMY Left 11/24/2020    Procedure: LEFT HT CORRECTION TOE;  Surgeon: Niranjan Child DPM;  [facility]: [facility];  Service: Podiatry    TONSILLECTOMY  1963       Social History     Socioeconomic History    Marital status: /Civil Union     Spouse name: Not on file    Number of children: Not on file    Years of education: Not on file    Highest education level: Not on file   Occupational History    Not on file   Tobacco Use    Smoking status: Never    Smokeless tobacco: Never    Tobacco comments:     Never smoked but exposed to second hand smoke from birth until 1980's   Vaping Use    Vaping status: Never Used   Substance and Sexual Activity    Alcohol use: Never    Drug use: Never    Sexual activity: Not Currently     Partners: Female     Birth control/protection: Abstinence   Other Topics Concern    Not on file   Social History Narrative    Not on file     Social Drivers of Health     Financial Resource Strain: Not on file   Food Insecurity: No Food Insecurity (9/3/2024)    Nursing - Inadequate Food Risk Classification     Worried About Running Out of Food in the Last Year: Never true     Ran Out of Food in the Last Year: Never true     Ran Out of Food in the Last Year: Not on file   Transportation Needs: No Transportation Needs (9/3/2024)    PRAPARE - Transportation     Lack of Transportation (Medical): No     Lack of Transportation (Non-Medical): No   Physical Activity: Not on file   Stress: Not on file   Social Connections: Unknown (6/18/2024)    Received from [employer]    Social [place]     How often do you feel lonely or isolated from those around you? (Adult - for ages 18 years  "and over): Not on file   Intimate Partner Violence: Not on file   Housing Stability: Low Risk  (9/3/2024)    Housing Stability Vital Sign     Unable to Pay for Housing in the Last Year: No     Number of Times Moved in the Last Year: 0     Homeless in the Last Year: No       Review of symptoms:   Review of Systems   Constitutional:  Negative for chills, diaphoresis and fever.   Respiratory:  Negative for cough, chest tightness and shortness of breath.    Cardiovascular:  Negative for chest pain, palpitations and leg swelling.   Gastrointestinal:  Negative for abdominal distention, blood in stool, nausea and vomiting.   Genitourinary:  Negative for difficulty urinating.   Musculoskeletal:  Negative for arthralgias and back pain.   Neurological:  Negative for dizziness, syncope, light-headedness and headaches.   Psychiatric/Behavioral:  Negative for agitation and confusion. The patient is not nervous/anxious.         Vitals: /84 (BP Location: Left arm, Patient Position: Sitting, Cuff Size: Standard)   Pulse 81   Resp 16   Ht 6' 3\" (1.905 m)   Wt 124 kg (274 lb)   SpO2 96%   BMI 34.25 kg/m²         Physical Exam:     Physical Exam  Vitals and nursing note reviewed.   Constitutional:       General: He is not in acute distress.     Appearance: He is well-developed.   HENT:      Head: Normocephalic and atraumatic.   Eyes:      Conjunctiva/sclera: Conjunctivae normal.   Neck:      Vascular: No carotid bruit.   Cardiovascular:      Rate and Rhythm: Normal rate and regular rhythm.      Heart sounds: Normal heart sounds. No murmur heard.  Pulmonary:      Effort: Pulmonary effort is normal. No respiratory distress.      Breath sounds: Normal breath sounds.   Abdominal:      Palpations: Abdomen is soft.      Tenderness: There is no abdominal tenderness.   Musculoskeletal:         General: No swelling.      Cervical back: Neck supple.      Right lower leg: No edema.      Left lower leg: No edema.      Comments: " Cervical collar in place   Skin:     General: Skin is warm and dry.      Capillary Refill: Capillary refill takes less than 2 seconds.   Neurological:      Mental Status: He is alert and oriented to person, place, and time.   Psychiatric:         Mood and Affect: Mood normal.            Thank you for allowing me to participate in the care and evaluation of your patient.  Should you have any questions, please feel free to contact me.

## 2024-11-16 PROBLEM — I71.21 ANEURYSM OF ASCENDING AORTA WITHOUT RUPTURE (HCC): Status: ACTIVE | Noted: 2024-11-16

## 2024-11-18 ENCOUNTER — HOSPITAL ENCOUNTER (OUTPATIENT)
Dept: CT IMAGING | Facility: HOSPITAL | Age: 69
Discharge: HOME | End: 2024-11-18
Attending: INTERNAL MEDICINE
Payer: MEDICARE

## 2024-11-18 DIAGNOSIS — C82.08 FOLLICULAR LYMPHOMA GRADE I OF LYMPH NODES OF MULTIPLE SITES (HCC): ICD-10-CM

## 2024-11-18 PROCEDURE — 74177 CT ABD & PELVIS W/CONTRAST: CPT

## 2024-11-18 PROCEDURE — 71260 CT THORAX DX C+: CPT

## 2024-11-18 RX ADMIN — IOHEXOL 100 ML: 350 INJECTION, SOLUTION INTRAVENOUS at 11:27

## 2024-11-20 ENCOUNTER — TELEMEDICINE (OUTPATIENT)
Dept: HEMATOLOGY ONCOLOGY | Facility: CLINIC | Age: 69
End: 2024-11-20
Payer: MEDICARE

## 2024-11-20 ENCOUNTER — TELEPHONE (OUTPATIENT)
Dept: HEMATOLOGY ONCOLOGY | Facility: CLINIC | Age: 69
End: 2024-11-20

## 2024-11-20 DIAGNOSIS — C82.08 FOLLICULAR LYMPHOMA GRADE I OF LYMPH NODES OF MULTIPLE SITES (HCC): Primary | ICD-10-CM

## 2024-11-20 PROCEDURE — 99214 OFFICE O/P EST MOD 30 MIN: CPT | Performed by: INTERNAL MEDICINE

## 2024-11-20 NOTE — TELEPHONE ENCOUNTER
Spoke with pt regarding follow up from virtual appt today.  CT Scan is scheduled for Tues 5/20/25 @ 1PM in [place].  Next virtual appt is scheduled for Tues 5/27/25 @ 1:15PM

## 2024-11-21 ENCOUNTER — TELEPHONE (OUTPATIENT)
Age: 69
End: 2024-11-21

## 2024-11-21 NOTE — TELEPHONE ENCOUNTER
Juliet, [facility], said she is faxing over a script for diabetic shoes and inserts for pt. Pt hoping to get these asap. Please be on the lookout for this.

## 2024-11-27 ENCOUNTER — TELEPHONE (OUTPATIENT)
Age: 69
End: 2024-11-27

## 2024-11-27 NOTE — TELEPHONE ENCOUNTER
PT CALLED TO CONFIRM UPCOMING APPT 12/4/2024 K[facility] AND XR, BUT WAS ALSO WONDERING IF THERE WAS ANY BLOODWORK NEEDED FOR THIS APPT AS HE RECALLS DISCUSSING LABS AT [facility] 11/8/2024 [facility] AND WANTS TO MAKE SURE ALL TESTS/STUDIES ARE COMPLETE FOR HIS NEXT APPT.    PLEASE NOTIFY PT IF LABS ARE NEEDED TO BE PLACED FOR 12/4/2024. PT MAY NOT BE ABLE TO ANSWER PHONE BUT REQUESTS EITHER A MESSAGE BE SENT THROUGH [employer] OR VM BE LEFT ADVISING HIM TO CHECK HIS LUCINA FOR UPDATED ORDERS.      THANK YOU

## 2024-12-02 ENCOUNTER — TELEPHONE (OUTPATIENT)
Dept: NEUROSURGERY | Facility: CLINIC | Age: 69
End: 2024-12-02

## 2024-12-02 ENCOUNTER — HOSPITAL ENCOUNTER (OUTPATIENT)
Dept: RADIOLOGY | Facility: HOSPITAL | Age: 69
Discharge: HOME | End: 2024-12-02
Payer: MEDICARE

## 2024-12-02 DIAGNOSIS — M46.22 OSTEOMYELITIS OF CERVICAL SPINE (HCC): ICD-10-CM

## 2024-12-02 PROCEDURE — 72052 X-RAY EXAM NECK SPINE 6/>VWS: CPT

## 2024-12-02 NOTE — TELEPHONE ENCOUNTER
Called patient to remind Xrays need to be completed prior to follow up on 12/4. He intends to complete them later today.    Appointment confirmed.

## 2024-12-04 ENCOUNTER — OFFICE VISIT (OUTPATIENT)
Dept: NEUROSURGERY | Facility: CLINIC | Age: 69
End: 2024-12-04
Payer: MEDICARE

## 2024-12-04 VITALS
HEART RATE: 67 BPM | WEIGHT: 275 LBS | OXYGEN SATURATION: 98 % | SYSTOLIC BLOOD PRESSURE: 130 MMHG | RESPIRATION RATE: 16 BRPM | BODY MASS INDEX: 34.19 KG/M2 | TEMPERATURE: 98.3 F | DIASTOLIC BLOOD PRESSURE: 80 MMHG | HEIGHT: 75 IN

## 2024-12-04 DIAGNOSIS — M46.22 ACUTE OSTEOMYELITIS OF CERVICAL SPINE (HCC): Primary | ICD-10-CM

## 2024-12-04 PROCEDURE — 99214 OFFICE O/P EST MOD 30 MIN: CPT | Performed by: NURSE PRACTITIONER

## 2024-12-04 RX ORDER — CEFADROXIL 500 MG/1
1 CAPSULE ORAL 2 TIMES DAILY
COMMUNITY
Start: 2024-11-09

## 2024-12-04 NOTE — PROGRESS NOTES
Name: Augustus Coffman      : 1955      MRN: [id number]  Encounter Provider: TANVIR Terrazas  Encounter Date: 2024   Encounter department: [facility]  :  Assessment & Plan  Acute osteomyelitis of cervical spine (HCC)  Presents for follow up for C5-6 OM/discitis  Discovered on outpatient imaging  in setting of several weeks of neck pain.  Hx MSSA bacteremia diagnosed  with 7 days of antibiotics.  Re-presented to hospital for treatment of above following results of outpatient MRI.  Continues on IV cefazolin through 10/2, continues on oral duricef per ID.  Oral durcef to continue until ESR/CRP stabilize.  ESR on 10/29 32   CRP on 10/29 3.3  Maintained in [place] vista cervical brace.  Denies neck pain, only pain in car ride at times when going over bumps. Does not radiate into arms. No numbness/weakness.    Imaging reviewed with :  Cervical flex ex x-rays 24: Stable sequela of discitis/osteomyelitis at C5-C6. Reversal of the normal cervical lordosis. No subluxation. Diffuse idiopathic skeletal hyperostosis    Plan:  Reviewed x-ray imaging extensively with patient and wife.   Discussed that he should continue antibiotic therapy per ID.  Adjusted vista collar while in office  Reviewed that patient unfortunately appears to have developed significant cervical kyphosis at level of discitis/osteomyelitis.  Fortunately he is not experiencing any concerning symptoms however we discussed that he may require surgical deformity correction at a later date.  This would only be considered once he has completed antibiotic therapy and infection has completely resolved.  He is not keen to proceed with any surgery however understands that it may become necessary.  Provided patient with instructions on how to wean his collar over the next few weeks  Patient will follow-up in the next 2-3 weeks after he sees infectious disease and is out of the collar with  repeat cervical flexion/extension x-rays to assess for any dynamic instability as joint appointment with spine surgeon or sooner symptoms worsen  Patient made aware to seek care sooner if he develops any new or worsening neurological changes or red flag signs  Patient made aware to contact neurosurgery with any further question or concerns      Orders:    XR spine cervical complete 6+ vw flex/ext/obl; Future        History of Present Illness     Wilmer Coffman is a 68 yo male with past medical history significant for diabetes, hypertension, hypercholesteremia, diabetic peripheral neuropathy, gout, obesity, chronic kidney disease, follicular lymphoma, A-fib, CHF, and GERD.  Patient seen in outpatient office today for ongoing follow-up for C5-6 osteomyelitis/discitis.    Patient originally presented to the hospital on 7/23/2024 with complaints of neck pain with healing right foot ulcer.  He was diagnosed with MSSA bacteremia and placed on antibiotic therapy for 7 days.  His neck pain continue to bother him and ultimately he saw orthopedic doctor as an outpatient who ordered an MRI, this was completed on 8/23/2024 which indicated C5-6 acute osteomyelitis/discitis with prevertebral abscess, he was advised to go to the emergency department immediately.  Ultimately he was transferred to [facility] for further management.  He was started on IV cefazolin.  He was placed in a cervical collar.    Patient was seen in our office on 9/26/2024 when imaging demonstrated developed significant cervical kyphosis at level of osteomyelitis/discitis.  Patient was not experiencing any concerning symptoms however it was discussed he may require surgical deformity correction at a later date which would only be considered once he has completed antibiotic therapy infection has completely resolved, patient is not too keen on proceeding with surgery but understands he may be necessary.  Patient did complete course of IV cefazolin on  10/2/2024.    Patient states since he was last seen he is doing well.  He denies any neck pain but does report when he is driving in a car going over bumps he develops some neck pain at times.  He states over the past few weeks he has been taking his collar off at times.  He denies any recent falls or traumas or difficulty with his balance.  He denies any difficulty with fine motor skills.  He does endorse numbness and tingling in his hands and feet which has been ongoing and is unchanged.  He continues to follow with infectious disease and is seeing them on 12/19.  He continues on Duricef.  He offers no other complaints.    Reviewed imaging with Dr. Sargent as well as patient which is grossly stable.  He continues to have significant cervical kyphosis at level of osteomyelitis/discitis.  Again patient is not experiencing any concerning symptoms however again briefly discussed how he may require surgical deformity correction at a later date.  In the meantime provided patient with instructions on how to wean himself out of the collar over the next few weeks.  Patient will follow-up once he is out of his collar with repeat cervical flexion/extension x-rays without collar to assess for any dynamic instability and after he sees infectious disease or sooner symptoms worsen as joint appointment with spine surgeon    HPI  Review of Systems   HENT:  Negative for trouble swallowing.    Respiratory:  Negative for chest tightness and shortness of breath.    Gastrointestinal: Negative.    Genitourinary: Negative.    Musculoskeletal:  Negative for gait problem.        Wearing collar  Only having occasional brief pain with jarring movement, ie. Riding in car / hitting bump or pot hole   Skin:  Positive for wound (bottom of right foot).   Neurological:  Negative for seizures, weakness, numbness and headaches.   Hematological:         Eliquis     BUNNY reviewed with patient and agree and changes are made as needed       Objective  "  /80 (BP Location: Left arm, Patient Position: Sitting, Cuff Size: Large)   Pulse 67   Temp 98.3 °F (36.8 °C) (Temporal)   Resp 16   Ht 6' 3\" (1.905 m)   Wt 125 kg (275 lb)   SpO2 98%   BMI 34.37 kg/m²     Physical Exam  Vitals reviewed.   Constitutional:       General: He is awake. He is not in acute distress.     Appearance: Normal appearance. He is not ill-appearing.      Comments: Vista collar in place   HENT:      Head: Normocephalic and atraumatic.   Eyes:      Extraocular Movements: Extraocular movements intact and EOM normal.      Conjunctiva/sclera: Conjunctivae normal.      Pupils: Pupils are equal, round, and reactive to light.   Neck:      Comments: [place] collar removed.  No pain with ROM of neck.  Some limiting ROM and neck stiffness.  Kyphosis deformity noted no midline TTP  Cardiovascular:      Rate and Rhythm: Normal rate.   Pulmonary:      Effort: Pulmonary effort is normal. No respiratory distress.   Chest:      Chest wall: No tenderness.   Abdominal:      General: There is no distension.      Palpations: Abdomen is soft.      Tenderness: There is no abdominal tenderness.   Musculoskeletal:         General: Normal range of motion.      Cervical back: No tenderness. No spinous process tenderness or muscular tenderness.   Skin:     General: Skin is warm and dry.   Neurological:      Mental Status: He is alert and oriented to person, place, and time.      Motor: Motor strength is normal.     Gait: Gait is intact.      Deep Tendon Reflexes:      Reflex Scores:       Bicep reflexes are 1+ on the right side and 1+ on the left side.       Patellar reflexes are 1+ on the right side and 1+ on the left side.  Psychiatric:         Attention and Perception: Attention and perception normal.         Mood and Affect: Mood and affect normal.         Speech: Speech normal.         Behavior: Behavior normal. Behavior is cooperative.         Thought Content: Thought content normal.         Cognition and " Memory: Cognition and memory normal.         Judgment: Judgment normal.       Neurologic Exam     Mental Status   Oriented to person, place, and time.   Follows 2 step commands.   Attention: normal. Concentration: normal.   Speech: speech is normal   Level of consciousness: alert  Knowledge: good.     Cranial Nerves     CN III, IV, VI   Pupils are equal, round, and reactive to light.  Extraocular motions are normal.   CN III: no CN III palsy  CN VI: no CN VI palsy  Conjugate gaze: present    CN V   Facial sensation intact.     CN VII   Facial expression full, symmetric.     CN VIII   CN VIII normal.     CN XI   CN XI normal.     CN XII   CN XII normal.     Motor Exam   Muscle bulk: normal  Overall muscle tone: normal    Strength   Strength 5/5 throughout.     Sensory Exam   Light touch normal.     Gait, Coordination, and Reflexes     Gait  Gait: normal    Reflexes   Right biceps: 1+  Left biceps: 1+  Right patellar: 1+  Left patellar: 1+  Right Orantes: absent  Left Orantes: absent  Right ankle clonus: absent  Left ankle clonus: absent      SL AMB Radiology Results Review: I personally reviewed the following image studies in PACS and associated radiology reports: xray(s). My interpretation of the radiology images/reports is:  .

## 2024-12-04 NOTE — ASSESSMENT & PLAN NOTE
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  Hx MSSA bacteremia diagnosed 7/28 with 7 days of antibiotics.  Re-presented to hospital for treatment of above following results of outpatient MRI.  Continues on IV cefazolin through 10/2, continues on oral duricef per ID.  Oral durcef to continue until ESR/CRP stabilize.  ESR on 10/29 32   CRP on 10/29 3.3  Maintained in [place] vista cervical brace.  Denies neck pain, only pain in car ride at times when going over bumps. Does not radiate into arms. No numbness/weakness.    Imaging reviewed with :  Cervical flex ex x-rays 12/2/24: Stable sequela of discitis/osteomyelitis at C5-C6. Reversal of the normal cervical lordosis. No subluxation. Diffuse idiopathic skeletal hyperostosis    Plan:  Reviewed x-ray imaging extensively with patient and wife.   Discussed that he should continue antibiotic therapy per ID.  Adjusted vista collar while in office  Reviewed that patient unfortunately appears to have developed significant cervical kyphosis at level of discitis/osteomyelitis.  Fortunately he is not experiencing any concerning symptoms however we discussed that he may require surgical deformity correction at a later date.  This would only be considered once he has completed antibiotic therapy and infection has completely resolved.  He is not keen to proceed with any surgery however understands that it may become necessary.  Provided patient with instructions on how to wean his collar over the next few weeks  Patient will follow-up in the next 2-3 weeks after he sees infectious disease and is out of the collar with repeat cervical flexion/extension x-rays to assess for any dynamic instability as joint appointment with spine surgeon or sooner symptoms worsen  Patient made aware to seek care sooner if he develops any new or worsening neurological changes or red flag signs  Patient made aware  to contact neurosurgery with any further question or concerns      Orders:    XR spine cervical complete 6+ vw flex/ext/obl; Future

## 2024-12-04 NOTE — PATIENT INSTRUCTIONS
I have instructed the patient to wean from their cervical collar.  They should do this by not wearing the collar 1-2 hours per day for the next week, then 2-4 hours per day the week following, then 4-8 hours per day the subsequent week, such that within 3-4 weeks they will have weand out of the collar entirely. These periods without the collar should be during more sedentary periods, e.g. reading, watching TV, having meals, etc. They should continue to wear the collar when doing strenuous activity (such as therapy etc.) until the weaning period is complete. They do not need to wear the collar while sleeping, but can during this weaning period if by doing so they feel more comfortable.       Follow up in 2 weeks with cervical flex ex xrays without collar

## 2024-12-05 ENCOUNTER — TELEPHONE (OUTPATIENT)
Dept: FAMILY MEDICINE CLINIC | Facility: CLINIC | Age: 69
End: 2024-12-05

## 2024-12-05 DIAGNOSIS — I48.91 NEW ONSET A-FIB (HCC): ICD-10-CM

## 2024-12-05 NOTE — TELEPHONE ENCOUNTER
Patients medication was recently increased and needs the script to reflect that. Directions listed below    Reason for call:   [x] Refill   [] Prior Auth  [] Other:     Office:   [x] PCP/Provider -   [] Specialty/Provider -     Medication:   Metoprolol Tartrate 25mg- take 2 tablets by mouth every 12 hours      Pharmacy: [facility]    Does the patient have enough for 3 days?   [x] Yes   [] No - Send as HP to POD

## 2024-12-06 RX ORDER — METOPROLOL TARTRATE 50 MG
50 TABLET ORAL EVERY 12 HOURS SCHEDULED
Qty: 200 TABLET | Refills: 1 | Status: SHIPPED | OUTPATIENT
Start: 2024-12-06

## 2024-12-12 ENCOUNTER — OFFICE VISIT (OUTPATIENT)
Dept: FAMILY MEDICINE CLINIC | Facility: CLINIC | Age: 69
End: 2024-12-12
Payer: MEDICARE

## 2024-12-12 VITALS
SYSTOLIC BLOOD PRESSURE: 142 MMHG | TEMPERATURE: 97 F | OXYGEN SATURATION: 99 % | HEART RATE: 72 BPM | WEIGHT: 295.6 LBS | DIASTOLIC BLOOD PRESSURE: 78 MMHG | HEIGHT: 75 IN | BODY MASS INDEX: 36.75 KG/M2

## 2024-12-12 DIAGNOSIS — N18.30 TYPE 2 DIABETES MELLITUS WITH STAGE 3 CHRONIC KIDNEY DISEASE, WITHOUT LONG-TERM CURRENT USE OF INSULIN, UNSPECIFIED WHETHER STAGE 3A OR 3B CKD (HCC): ICD-10-CM

## 2024-12-12 DIAGNOSIS — M46.22 ACUTE OSTEOMYELITIS OF CERVICAL SPINE (HCC): Primary | ICD-10-CM

## 2024-12-12 DIAGNOSIS — N28.1 CYST OF RIGHT KIDNEY: ICD-10-CM

## 2024-12-12 DIAGNOSIS — I71.21 ANEURYSM OF ASCENDING AORTA WITHOUT RUPTURE (HCC): ICD-10-CM

## 2024-12-12 DIAGNOSIS — E11.22 TYPE 2 DIABETES MELLITUS WITH STAGE 3 CHRONIC KIDNEY DISEASE, WITHOUT LONG-TERM CURRENT USE OF INSULIN, UNSPECIFIED WHETHER STAGE 3A OR 3B CKD (HCC): ICD-10-CM

## 2024-12-12 PROCEDURE — 99214 OFFICE O/P EST MOD 30 MIN: CPT | Performed by: FAMILY MEDICINE

## 2024-12-12 PROCEDURE — G2211 COMPLEX E/M VISIT ADD ON: HCPCS | Performed by: FAMILY MEDICINE

## 2024-12-12 NOTE — ASSESSMENT & PLAN NOTE
46mm in ascending throacic aorta, recommending vascular surgery consult.   Orders:  •  Ambulatory Referral to Vascular Surgery; Future

## 2024-12-12 NOTE — PROGRESS NOTES
"Name: Augustus Coffman      : 1955      MRN: [id number]  Encounter Provider: Gwen Lazo DO  Encounter Date: 2024   Encounter department: [facility] [address]  :  Assessment & Plan  Acute osteomyelitis of cervical spine (HCC)  Following with Neurosurgery and ID. Continues with ABX at this time. Planning for surgery        Type 2 diabetes mellitus with stage 3 chronic kidney disease, without long-term current use of insulin, unspecified whether stage 3a or 3b CKD (HCC)    Lab Results   Component Value Date    HGBA1C 5.5 10/08/2024          Aneurysm of ascending aorta without rupture (HCC)  46mm in ascending throacic aorta, recommending vascular surgery consult.   Orders:  •  Ambulatory Referral to Vascular Surgery; Future    Cyst of right kidney  Seen on CT scan abd/pelvis, slightly complex cyst lesion of the lower pole of the right kidney.   Orders:  •  US kidney and bladder; Future         History of Present Illness     HPI    Following with neuro surgery, planning for surgery when ABX are completed. He is seeing ID next week.   Only wearing collar when in the car. Has been doing some home PT exercises.   Denies swelling in LE edema. Notes that he is compliant with his water pill.     Notes that he is motivated to lose weight, notes that he does not want       Review of Systems    Objective   /78   Pulse 72   Temp (!) 97 °F (36.1 °C) (Tympanic)   Ht 6' 3\" (1.905 m)   Wt 134 kg (295 lb 9.6 oz)   SpO2 99%   BMI 36.95 kg/m²      Physical Exam  Vitals reviewed.   Constitutional:       General: He is not in acute distress.     Appearance: Normal appearance.   HENT:      Head: Normocephalic and atraumatic.      Right Ear: External ear normal.      Left Ear: External ear normal.      Nose: Nose normal.      Mouth/Throat:      Mouth: Mucous membranes are moist.   Eyes:      Extraocular Movements: Extraocular movements intact.      Conjunctiva/sclera: " Conjunctivae normal.      Pupils: Pupils are equal, round, and reactive to light.   Neck:      Comments: Forward head carriage with limited ROM and increased kyphosis.   Cardiovascular:      Rate and Rhythm: Normal rate and regular rhythm.      Heart sounds: Normal heart sounds.   Pulmonary:      Effort: Pulmonary effort is normal.      Breath sounds: Normal breath sounds. No wheezing, rhonchi or rales.   Abdominal:      General: Bowel sounds are normal. There is no distension.      Palpations: Abdomen is soft.      Tenderness: There is no abdominal tenderness.   Musculoskeletal:      Right lower leg: No edema.      Left lower leg: No edema.   Skin:     General: Skin is warm.      Capillary Refill: Capillary refill takes less than 2 seconds.      Findings: No rash.   Neurological:      Mental Status: He is alert. Mental status is at baseline.             DO Justin Garza [facility]  12/12/2024 4:23 PM

## 2024-12-13 ENCOUNTER — HOSPITAL ENCOUNTER (EMERGENCY)
Facility: HOSPITAL | Age: 69
Discharge: HOME | End: 2024-12-13
Attending: EMERGENCY MEDICINE
Payer: MEDICARE

## 2024-12-13 VITALS
OXYGEN SATURATION: 96 % | RESPIRATION RATE: 18 BRPM | HEART RATE: 71 BPM | SYSTOLIC BLOOD PRESSURE: 162 MMHG | HEIGHT: 75 IN | BODY MASS INDEX: 36.73 KG/M2 | WEIGHT: 295.42 LBS | DIASTOLIC BLOOD PRESSURE: 86 MMHG | TEMPERATURE: 98.2 F

## 2024-12-13 DIAGNOSIS — I15.8 OTHER SECONDARY HYPERTENSION: Primary | ICD-10-CM

## 2024-12-13 LAB
2HR DELTA HS TROPONIN: 0 NG/L
ALBUMIN SERPL BCG-MCNC: 4.9 G/DL (ref 3.5–5)
ALP SERPL-CCNC: 61 U/L (ref 34–104)
ALT SERPL W P-5'-P-CCNC: 17 U/L (ref 7–52)
ANION GAP SERPL CALCULATED.3IONS-SCNC: 8 MMOL/L (ref 4–13)
AST SERPL W P-5'-P-CCNC: 20 U/L (ref 13–39)
BASOPHILS # BLD AUTO: 0.1 THOUSANDS/ÂΜL (ref 0–0.1)
BASOPHILS NFR BLD AUTO: 1 % (ref 0–1)
BILIRUB SERPL-MCNC: 0.43 MG/DL (ref 0.2–1)
BUN SERPL-MCNC: 23 MG/DL (ref 5–25)
CALCIUM SERPL-MCNC: 10.2 MG/DL (ref 8.4–10.2)
CARDIAC TROPONIN I PNL SERPL HS: 11 NG/L (ref ?–50)
CARDIAC TROPONIN I PNL SERPL HS: 11 NG/L (ref ?–50)
CHLORIDE SERPL-SCNC: 98 MMOL/L (ref 96–108)
CO2 SERPL-SCNC: 33 MMOL/L (ref 21–32)
CREAT SERPL-MCNC: 1.11 MG/DL (ref 0.6–1.3)
EOSINOPHIL # BLD AUTO: 0.29 THOUSAND/ÂΜL (ref 0–0.61)
EOSINOPHIL NFR BLD AUTO: 3 % (ref 0–6)
ERYTHROCYTE [DISTWIDTH] IN BLOOD BY AUTOMATED COUNT: 16.4 % (ref 11.6–15.1)
GFR SERPL CREATININE-BSD FRML MDRD: 67 ML/MIN/1.73SQ M
GLUCOSE SERPL-MCNC: 105 MG/DL (ref 65–140)
HCT VFR BLD AUTO: 47 % (ref 36.5–49.3)
HGB BLD-MCNC: 14.3 G/DL (ref 12–17)
IMM GRANULOCYTES # BLD AUTO: 0.02 THOUSAND/UL (ref 0–0.2)
IMM GRANULOCYTES NFR BLD AUTO: 0 % (ref 0–2)
LYMPHOCYTES # BLD AUTO: 0.88 THOUSANDS/ÂΜL (ref 0.6–4.47)
LYMPHOCYTES NFR BLD AUTO: 10 % (ref 14–44)
MCH RBC QN AUTO: 25.2 PG (ref 26.8–34.3)
MCHC RBC AUTO-ENTMCNC: 30.4 G/DL (ref 31.4–37.4)
MCV RBC AUTO: 83 FL (ref 82–98)
MONOCYTES # BLD AUTO: 0.55 THOUSAND/ÂΜL (ref 0.17–1.22)
MONOCYTES NFR BLD AUTO: 6 % (ref 4–12)
NEUTROPHILS # BLD AUTO: 7.03 THOUSANDS/ÂΜL (ref 1.85–7.62)
NEUTS SEG NFR BLD AUTO: 80 % (ref 43–75)
NRBC BLD AUTO-RTO: 0 /100 WBCS
PLATELET # BLD AUTO: 174 THOUSANDS/UL (ref 149–390)
PMV BLD AUTO: 9.9 FL (ref 8.9–12.7)
POTASSIUM SERPL-SCNC: 3.9 MMOL/L (ref 3.5–5.3)
PROT SERPL-MCNC: 8.2 G/DL (ref 6.4–8.4)
RBC # BLD AUTO: 5.67 MILLION/UL (ref 3.88–5.62)
SODIUM SERPL-SCNC: 139 MMOL/L (ref 135–147)
WBC # BLD AUTO: 8.87 THOUSAND/UL (ref 4.31–10.16)

## 2024-12-13 PROCEDURE — 85025 COMPLETE CBC W/AUTO DIFF WBC: CPT | Performed by: EMERGENCY MEDICINE

## 2024-12-13 PROCEDURE — 84484 ASSAY OF TROPONIN QUANT: CPT | Performed by: EMERGENCY MEDICINE

## 2024-12-13 PROCEDURE — 93005 ELECTROCARDIOGRAM TRACING: CPT

## 2024-12-13 PROCEDURE — 99284 EMERGENCY DEPT VISIT MOD MDM: CPT

## 2024-12-13 PROCEDURE — 36415 COLL VENOUS BLD VENIPUNCTURE: CPT

## 2024-12-13 PROCEDURE — 99284 EMERGENCY DEPT VISIT MOD MDM: CPT | Performed by: EMERGENCY MEDICINE

## 2024-12-13 PROCEDURE — 80053 COMPREHEN METABOLIC PANEL: CPT | Performed by: EMERGENCY MEDICINE

## 2024-12-14 LAB
ATRIAL RATE: 68 BPM
P AXIS: 41 DEGREES
PR INTERVAL: 230 MS
QRS AXIS: -3 DEGREES
QRSD INTERVAL: 84 MS
QT INTERVAL: 368 MS
QTC INTERVAL: 391 MS
T WAVE AXIS: 3 DEGREES
VENTRICULAR RATE: 68 BPM

## 2024-12-14 PROCEDURE — 93010 ELECTROCARDIOGRAM REPORT: CPT | Performed by: INTERNAL MEDICINE

## 2024-12-14 NOTE — DISCHARGE INSTRUCTIONS
You were seen and evaluated today for high blood pressure.  Your test results demonstrated normal cardiac enzymes, baseline renal function. No evidence of hypertensive emergency.   Please take all medications as instructed. Follow up with your PCP as discussed.   RETURN TO THE EMERGENCY DEPARTMENT if you develop new or worsening symptoms and are unable to see your PCP.

## 2024-12-14 NOTE — ED PROVIDER NOTES
Time reflects when diagnosis was documented in both MDM as applicable and the Disposition within this note       Time User Action Codes Description Comment    12/13/2024  9:17 PM Kenna Feliz Add [I15.8] Other secondary hypertension           ED Disposition       ED Disposition   Discharge    Condition   Stable    Date/Time   Fri Dec 13, 2024  9:17 PM    Comment   Augustus Coffman discharge to home/self care.                   Assessment & Plan       Medical Decision Making  Patient is a pleasant 69-year-old male with past medical history of hypertension who presents due to concern for elevated blood pressure reading at home.  He states that he was seen by his PCP yesterday with an abnormally high reading.  No med adjustments were made, as she requested to trend his BP over several weeks.  He checked his blood pressure this evening and noted that it was high on several readings which prompted his evaluation in the emergency department he offers no complaints and is asymptomatic.  Counseled extensively regarding asymptomatic hypertension, ED management of antihypertensives, and follow-up with his PCP.  He is agreeable with blood pressure log and follow-up pending reassuring laboratory studies in the emergency department.    Amount and/or Complexity of Data Reviewed  Labs: ordered. Decision-making details documented in ED Course.        ED Course as of 12/14/24 0009   Fri Dec 13, 2024   1940 HS Troponin 0hr (reflex protocol)   1940 Comprehensive metabolic panel(!)   1940 CBC and differential(!)       Medications - No data to display    ED Risk Strat Scores                  Identification of Seniors at Risk      Flowsheet Row Most Recent Value   (ISAR) Identification of Seniors at Risk    Before the illness or injury that brought you to the Emergency, did you need someone to help you on a regular basis? 0 Filed at: 12/13/2024 1843   In the last 24 hours, have you needed more help than usual? 0 Filed at:  12/13/2024 1843   Have you been hospitalized for one or more nights during the past 6 months? 0 Filed at: 12/13/2024 1843   In general, do you see well? 0 Filed at: 12/13/2024 1843   In general, do you have serious problems with your memory? 0 Filed at: 12/13/2024 1843   Do you take more than three different medications every day? 0 Filed at: 12/13/2024 1843   ISAR Score 0 Filed at: 12/13/2024 1843                SBIRT 22yo+      Flowsheet Row Most Recent Value   Initial Alcohol Screen: US AUDIT-C     1. How often do you have a drink containing alcohol? 0 Filed at: 12/13/2024 1843   2. How many drinks containing alcohol do you have on a typical day you are drinking?  0 Filed at: 12/13/2024 1843   3a. Male UNDER 65: How often do you have five or more drinks on one occasion? 0 Filed at: 12/13/2024 1843   Audit-C Score 0 Filed at: 12/13/2024 1843   MARVIN: How many times in the past year have you...    Used an illegal drug or used a prescription medication for non-medical reasons? Never Filed at: 12/13/2024 1843                            History of Present Illness       Chief Complaint   Patient presents with    Hypertension     Pt took his blood pressure this evening and it was high. Pt states he felt light headed earlier today        Past Medical History:   Diagnosis Date    Aneurysm (HCC)     Arthritis 2010's    Occasionally flares up    Cancer (HCC) May 2021    Still investigating    Chronic kidney disease     Diabetes mellitus (HCC)     Follicular lymphoma (HCC)     GERD (gastroesophageal reflux disease) June 2021    Noticed in an Endoscopy    Heart murmur May 2020    High cholesterol     Hypertension     Kidney stone 1980's    Have had since 1980's    Obesity Since Childhood      Past Surgical History:   Procedure Laterality Date    BUNIONECTOMY Right 11/24/2020    Procedure: RIGHT HAV CORRECTION,;  Surgeon: Niranjan Child DPM;  [facility]: [facility];  Service: Podiatry    COLONOSCOPY      INCISION AND DRAINAGE OF  WOUND Right 10/05/2016    Procedure: INCISION AND DRAINAGE (I&D) EXTREMITY;  Surgeon: Niranjan Child DPM;  Location[facility];  Service:     IR BIOPSY LYMPH NODE  07/08/2021    IR EMBOLIZATION (SPECIFY VESSEL OR SITE)  07/08/2021    IR PORT PLACEMENT  09/01/2022    IR TUNNELED CENTRAL LINE PLACEMENT  08/29/2024    IR TUNNELED CENTRAL LINE REMOVAL  10/09/2024    LIVER BIOPSY  7/8/2021    PILONIDAL CYST EXCISION      MT CORRECTION HAMMERTOE Right 02/19/2019    Procedure: THIRD HAMMER TOE CORRECTION;  Surgeon: Niranjan Child DPM;  [facility];  Service: Podiatry    MT RMVL MATEO CTR VAD W/SUBQ PORT/ CTR/PRPH INSJ N/A 03/14/2023    Procedure: REMOVAL VENOUS PORT (PORT-A-CATH)IR;  Surgeon: Avelino Vázquez DO;  [facility];  Service: Interventional Radiology    TOE OSTEOTOMY Right 03/14/2017    Procedure: HAMMERTOE CORRECTION R 2 ;  Surgeon: Niranjan Child DPM;  [facility];  Service:     TOE OSTEOTOMY Left 11/24/2020    Procedure: LEFT HT CORRECTION TOE;  Surgeon: Niranjan Child DPM;  [facility];  Service: Podiatry    TONSILLECTOMY  1963    UPPER GASTROINTESTINAL ENDOSCOPY  8/15/2022      Family History   Problem Relation Age of Onset    Cancer Father         Leukemia      Social History     Tobacco Use    Smoking status: Never    Smokeless tobacco: Never    Tobacco comments:     Never smoked but exposed to second hand smoke from birth until 1980's   Vaping Use    Vaping status: Never Used   Substance Use Topics    Alcohol use: Never    Drug use: Never      E-Cigarette/Vaping    E-Cigarette Use Never User       E-Cigarette/Vaping Substances    Nicotine No     THC No     CBD No     Flavoring No     Other No     Unknown No       I have reviewed and agree with the history as documented.     The patient is a 69 yoM who presents with complaints of hypertension          Review of Systems   Respiratory:  Negative for chest tightness.    Cardiovascular:  Negative for chest pain, palpitations  and leg swelling.   Neurological:  Negative for seizures, numbness and headaches.           Objective       ED Triage Vitals [12/13/24 1840]   Temperature Pulse Blood Pressure Respirations SpO2 Patient Position - Orthostatic VS   98.2 °F (36.8 °C) 71 (!) 186/95 18 100 % Sitting      Temp Source Heart Rate Source BP Location FiO2 (%) Pain Score    Oral Monitor Left arm -- --      Vitals      Date and Time Temp Pulse SpO2 Resp BP Pain Score FACES Pain Rating User   12/13/24 2100 -- 71 96 % -- 162/86 -- -- AR   12/13/24 2056 -- 75 97 % 18 168/86 -- -- EJ   12/13/24 2045 -- -- -- -- 191/102 -- -- AR   12/13/24 1940 -- -- -- -- 190/95 -- -- AR   12/13/24 1840 98.2 °F (36.8 °C) 71 100 % 18 186/95 -- -- LA            Physical Exam  Vitals and nursing note reviewed.   Constitutional:       General: He is not in acute distress.     Appearance: Normal appearance.   HENT:      Head: Normocephalic and atraumatic.      Right Ear: External ear normal.      Left Ear: External ear normal.      Nose: Nose normal.   Cardiovascular:      Rate and Rhythm: Normal rate and regular rhythm.   Pulmonary:      Effort: Pulmonary effort is normal.      Breath sounds: Normal breath sounds.   Abdominal:      General: There is no distension.      Palpations: Abdomen is soft.      Tenderness: There is no abdominal tenderness.   Skin:     General: Skin is warm and dry.   Neurological:      General: No focal deficit present.      Mental Status: He is alert and oriented to person, place, and time. Mental status is at baseline.   Psychiatric:         Behavior: Behavior normal.         Results Reviewed       Procedure Component Value Units Date/Time    HS Troponin I 2hr [[id number]]  (Normal) Collected: 12/13/24 2044    Lab Status: Final result Specimen: Blood from Arm, Right Updated: 12/13/24 2110     hs TnI 2hr 11 ng/L      Delta 2hr hsTnI 0 ng/L     HS Troponin 0hr (reflex protocol) [[id number]]  (Normal) Collected: 12/13/24 1847    Lab Status: Final  result Specimen: Blood from Arm, Right Updated: 12/13/24 1915     hs TnI 0hr 11 ng/L     Comprehensive metabolic panel [[id number]]  (Abnormal) Collected: 12/13/24 1847    Lab Status: Final result Specimen: Blood from Arm, Right Updated: 12/13/24 1909     Sodium 139 mmol/L      Potassium 3.9 mmol/L      Chloride 98 mmol/L      CO2 33 mmol/L      ANION GAP 8 mmol/L      BUN 23 mg/dL      Creatinine 1.11 mg/dL      Glucose 105 mg/dL      Calcium 10.2 mg/dL      AST 20 U/L      ALT 17 U/L      Alkaline Phosphatase 61 U/L      Total Protein 8.2 g/dL      Albumin 4.9 g/dL      Total Bilirubin 0.43 mg/dL      eGFR 67 ml/min/1.73sq m     Narrative:      National Kidney Disease Foundation guidelines for Chronic Kidney Disease (CKD):     Stage 1 with normal or high GFR (GFR > 90 mL/min/1.73 square meters)    Stage 2 Mild CKD (GFR = 60-89 mL/min/1.73 square meters)    Stage 3A Moderate CKD (GFR = 45-59 mL/min/1.73 square meters)    Stage 3B Moderate CKD (GFR = 30-44 mL/min/1.73 square meters)    Stage 4 Severe CKD (GFR = 15-29 mL/min/1.73 square meters)    Stage 5 End Stage CKD (GFR <15 mL/min/1.73 square meters)  Note: GFR calculation is accurate only with a steady state creatinine    CBC and differential [[id number]]  (Abnormal) Collected: 12/13/24 1847    Lab Status: Final result Specimen: Blood from Arm, Right Updated: 12/13/24 1853     WBC 8.87 Thousand/uL      RBC 5.67 Million/uL      Hemoglobin 14.3 g/dL      Hematocrit 47.0 %      MCV 83 fL      MCH 25.2 pg      MCHC 30.4 g/dL      RDW 16.4 %      MPV 9.9 fL      Platelets 174 Thousands/uL      nRBC 0 /100 WBCs      Segmented % 80 %      Immature Grans % 0 %      Lymphocytes % 10 %      Monocytes % 6 %      Eosinophils Relative 3 %      Basophils Relative 1 %      Absolute Neutrophils 7.03 Thousands/µL      Absolute Immature Grans 0.02 Thousand/uL      Absolute Lymphocytes 0.88 Thousands/µL      Absolute Monocytes 0.55 Thousand/µL      Eosinophils Absolute 0.29  Thousand/µL      Basophils Absolute 0.10 Thousands/µL             No orders to display       Procedures    ED Medication and Procedure Management   Prior to Admission Medications   Prescriptions Last Dose Informant Patient Reported? Taking?   Cholecalciferol 100 MCG (4000 UT) CAPS  Self No No   Sig: Take 1.25 capsules (5,000 Units total) by mouth in the morning   Multiple Vitamins-Minerals (Centrum Silver 50+Men) TABS  Self Yes No   acetaminophen (TYLENOL) 325 mg tablet  Self No No   Sig: Take 3 tablets (975 mg total) by mouth every 8 (eight) hours as needed for mild pain or moderate pain   Patient not taking: Reported on 12/4/2024   amiodarone 200 mg tablet  Self No No   Sig: Take 1 tablet (200 mg total) by mouth daily   apixaban (ELIQUIS) 5 mg  Self No No   Sig: Take 1 tablet (5 mg total) by mouth 2 (two) times a day   cefadroxil (DURICEF) 500 mg capsule   Yes No   Sig: Take 1 capsule by mouth 2 (two) times a day   furosemide (LASIX) 20 mg tablet  Self No No   Sig: TAKE 1 TABLET BY MOUTH EVERY DAY   losartan (COZAAR) 25 mg tablet  Self No No   Sig: Take 1 tablet (25 mg total) by mouth daily   metFORMIN (GLUCOPHAGE) 500 mg tablet  Self No No   Sig: TAKE 2 TABLETS BY MOUTH TWICE A DAY WITH FOOD   methocarbamol (ROBAXIN) 750 mg tablet  Self No No   Sig: Take 1 tablet (750 mg total) by mouth every 6 (six) hours as needed for muscle spasms for up to 14 days   metoprolol tartrate (LOPRESSOR) 50 mg tablet   No No   Sig: Take 1 tablet (50 mg total) by mouth every 12 (twelve) hours   polyethylene glycol (MIRALAX) 17 g packet  Self No No   Sig: Take 17 g by mouth daily   rosuvastatin (CRESTOR) 40 MG tablet  Self No No   Sig: TAKE 1 TABLET BY MOUTH EVERY DAY IN THE EVENING      Facility-Administered Medications Last Administration Doses Remaining   lidocaine (LMX) 4 % cream None recorded 1        Discharge Medication List as of 12/13/2024  9:19 PM        CONTINUE these medications which have NOT CHANGED    Details    acetaminophen (TYLENOL) 325 mg tablet Take 3 tablets (975 mg total) by mouth every 8 (eight) hours as needed for mild pain or moderate pain, Starting Sun 9/8/2024, No Print      amiodarone 200 mg tablet Take 1 tablet (200 mg total) by mouth daily, Starting Thu 10/24/2024, Normal      apixaban (ELIQUIS) 5 mg Take 1 tablet (5 mg total) by mouth 2 (two) times a day, Starting Fri 10/4/2024, Normal      cefadroxil (DURICEF) 500 mg capsule Take 1 capsule by mouth 2 (two) times a day, Starting Sat 11/9/2024, Historical Med      Cholecalciferol 100 MCG (4000 UT) CAPS Take 1.25 capsules (5,000 Units total) by mouth in the morning, Starting Mon 9/23/2024, No Print      furosemide (LASIX) 20 mg tablet TAKE 1 TABLET BY MOUTH EVERY DAY, Starting Thu 10/31/2024, Normal      losartan (COZAAR) 25 mg tablet Take 1 tablet (25 mg total) by mouth daily, Starting Wed 11/13/2024, Normal      metFORMIN (GLUCOPHAGE) 500 mg tablet TAKE 2 TABLETS BY MOUTH TWICE A DAY WITH FOOD, Starting Fri 11/8/2024, Normal      methocarbamol (ROBAXIN) 750 mg tablet Take 1 tablet (750 mg total) by mouth every 6 (six) hours as needed for muscle spasms for up to 14 days, Starting Mon 9/9/2024, Until Thu 12/12/2024 at 2359, Normal      metoprolol tartrate (LOPRESSOR) 50 mg tablet Take 1 tablet (50 mg total) by mouth every 12 (twelve) hours, Starting Fri 12/6/2024, Normal      Multiple Vitamins-Minerals (Centrum Silver 50+Men) TABS Historical Med      polyethylene glycol (MIRALAX) 17 g packet Take 17 g by mouth daily, Starting Wed 9/4/2024, No Print      rosuvastatin (CRESTOR) 40 MG tablet TAKE 1 TABLET BY MOUTH EVERY DAY IN THE EVENING, Starting Fri 11/8/2024, Normal           No discharge procedures on file.  ED SEPSIS DOCUMENTATION   Time reflects when diagnosis was documented in both MDM as applicable and the Disposition within this note       Time User Action Codes Description Comment    12/13/2024  9:17 PM Kenna Feliz Add [I15.8] Other  secondary hypertension                  Kenna Feliz MD  12/14/24 0016

## 2024-12-17 ENCOUNTER — OFFICE VISIT (OUTPATIENT)
Dept: CARDIAC SURGERY | Facility: CLINIC | Age: 69
End: 2024-12-17
Payer: MEDICARE

## 2024-12-17 ENCOUNTER — TELEPHONE (OUTPATIENT)
Dept: FAMILY MEDICINE CLINIC | Facility: CLINIC | Age: 69
End: 2024-12-17

## 2024-12-17 VITALS
WEIGHT: 293.7 LBS | BODY MASS INDEX: 36.52 KG/M2 | OXYGEN SATURATION: 96 % | SYSTOLIC BLOOD PRESSURE: 143 MMHG | HEIGHT: 75 IN | HEART RATE: 64 BPM | DIASTOLIC BLOOD PRESSURE: 73 MMHG

## 2024-12-17 DIAGNOSIS — I77.810 ECTATIC THORACIC AORTA (HCC): Primary | ICD-10-CM

## 2024-12-17 DIAGNOSIS — I71.21 ANEURYSM OF ASCENDING AORTA WITHOUT RUPTURE (HCC): ICD-10-CM

## 2024-12-17 PROCEDURE — 99205 OFFICE O/P NEW HI 60 MIN: CPT | Performed by: STUDENT IN AN ORGANIZED HEALTH CARE EDUCATION/TRAINING PROGRAM

## 2024-12-17 NOTE — LETTER
December 17, 2024     Gwen Lazo DO  [address]  [place] [address]    Patient: Augustus Coffman   YOB: 1955   Date of Visit: 12/17/2024       Dear Dr. Lazo:    Thank you for referring Augustus Coffman to me for evaluation. Below are my notes for this consultation.    If you have questions, please do not hesitate to call me. I look forward to following your patient along with you.         Sincerely,        Jonathan Donahue MD        CC: No Recipients    Sandi Prakash PA-C  12/17/2024  2:30 PM  Attested  Consultation - Cardiac Surgery   Augustus Coffman 69 y.o. male MRN: [id number]    Physician Requesting Consult: Gwen Lazo DO     Reason for Consult / Principal Problem: Ascending aortic aneurysm    History of Present Illness: Augustus Coffman is a 69 y.o. year old male with significant PMH including HTN, HLD, ascending aortic aneurysm, mod. AS- trileaflet, pericarditis, pericardial effusion (seen in August 2024, no intervention), PAF (dx in August of 2024, on Eliquis), obesity (BMI 37), CKD3, follicular lymphoma (dx 2 yrs ago, chemo, no radiation, mild increase in lymph nodes, next CT scan in May), MSSA bacteremia likely from hardware in foot (follows with podiatry, in boot), cervical spine OM (follows with neurosurgery, likely will need surgery for kyphosis once completes antibx, follows with ID on PO duricef, wearing collar when in car), cyst on kidney (U/S workup pending), NIDDM2, hx of GI bleed (patient denies, likely hemorrhoids) who presents today for surgical consultation for ascending aortic aneurysm. In review, patient has been following with oncology for lymphoma, CTA (5/28/21) first noting aneurysm measuring 4.5 cm.    Patient relays story above. Patient is doing well, continues with issues from his neck w/ limited ROM. He is ambulating without assistance but does have boot on foot. Denies CP, palpitations, SOB, JUAREZ, lightheaded/dizziness,  syncope, numbness/paraesthesias, melena, weight gain, or LE edema.       Past Medical History:  Past Medical History:   Diagnosis Date   • Aneurysm (HCC)    • Arthritis 2010's    Occasionally flares up   • Cancer (HCC) May 2021    Still investigating   • Chronic kidney disease    • Diabetes mellitus (HCC)    • Follicular lymphoma (HCC)    • GERD (gastroesophageal reflux disease) June 2021    Noticed in an Endoscopy   • Heart murmur May 2020   • High cholesterol    • Hypertension    • Kidney stone 1980's    Have had since 1980's   • Obesity Since Childhood       Past Surgical History:   Past Surgical History:   Procedure Laterality Date   • BUNIONECTOMY Right 11/24/2020    Procedure: RIGHT HAV CORRECTION,;  Surgeon: Niranjan Child DPM;  Location: [facility];  Service: Podiatry   • COLONOSCOPY     • INCISION AND DRAINAGE OF WOUND Right 10/05/2016    Procedure: INCISION AND DRAINAGE (I&D) EXTREMITY;  Surgeon: Niranjan Child DPM;  Location[facility];  Service:    • IR BIOPSY LYMPH NODE  07/08/2021   • IR EMBOLIZATION (SPECIFY VESSEL OR SITE)  07/08/2021   • IR PORT PLACEMENT  09/01/2022   • IR TUNNELED CENTRAL LINE PLACEMENT  08/29/2024   • IR TUNNELED CENTRAL LINE REMOVAL  10/09/2024   • LIVER BIOPSY  7/8/2021   • PILONIDAL CYST EXCISION     • NV CORRECTION HAMMERTOE Right 02/19/2019    Procedure: THIRD HAMMER TOE CORRECTION;  Surgeon: Niranjan Child DPM;  [facility];  Service: Podiatry   • NV RMVL MATEO CTR VAD W/SUBQ PORT/ CTR/PRPH INSJ N/A 03/14/2023    Procedure: REMOVAL VENOUS PORT (PORT-A-CATH)IR;  Surgeon: Avelino Vázquez DO;  [facility];  Service: Interventional Radiology   • TOE OSTEOTOMY Right 03/14/2017    Procedure: HAMMERTOE CORRECTION R 2 ;  Surgeon: Niranjan Child DPM;  [facility];  Service:    • TOE OSTEOTOMY Left 11/24/2020    Procedure: LEFT HT CORRECTION TOE;  Surgeon: Niranjan Child DPM;  [facility];  Service: Podiatry   • TONSILLECTOMY  1963   • UPPER  GASTROINTESTINAL ENDOSCOPY  8/15/2022         Family History:  Family History   Problem Relation Age of Onset   • Cancer Father         Leukemia         Social History:    Social History     Substance and Sexual Activity   Alcohol Use Never     Social History     Substance and Sexual Activity   Drug Use Never     Social History     Tobacco Use   Smoking Status Never   Smokeless Tobacco Never   Tobacco Comments    Never smoked but exposed to second hand smoke from birth until 1980's       Marital Status: [unfilled]    Home Medications:   Prior to Admission medications    Medication Sig Start Date End Date Taking? Authorizing Provider   acetaminophen (TYLENOL) 325 mg tablet Take 3 tablets (975 mg total) by mouth every 8 (eight) hours as needed for mild pain or moderate pain  Patient not taking: Reported on 12/4/2024 9/8/24   May Altamirano PA-C   amiodarone 200 mg tablet Take 1 tablet (200 mg total) by mouth daily 10/24/24   Gwen Lazo DO   apixaban (ELIQUIS) 5 mg Take 1 tablet (5 mg total) by mouth 2 (two) times a day 10/4/24   May Altamirano PA-C   cefadroxil (DURICEF) 500 mg capsule Take 1 capsule by mouth 2 (two) times a day 11/9/24   Historical Provider, MD   Cholecalciferol 100 MCG (4000 UT) CAPS Take 1.25 capsules (5,000 Units total) by mouth in the morning 9/23/24   Jorden Heath MD   furosemide (LASIX) 20 mg tablet TAKE 1 TABLET BY MOUTH EVERY DAY 10/31/24   Gwen Lazo DO   losartan (COZAAR) 25 mg tablet Take 1 tablet (25 mg total) by mouth daily 11/13/24   Gwen Lazo DO   metFORMIN (GLUCOPHAGE) 500 mg tablet TAKE 2 TABLETS BY MOUTH TWICE A DAY WITH FOOD 11/8/24   Gwen Lazo DO   methocarbamol (ROBAXIN) 750 mg tablet Take 1 tablet (750 mg total) by mouth every 6 (six) hours as needed for muscle spasms for up to 14 days 9/9/24 12/12/24  Ashley Depadua, MD   metoprolol tartrate (LOPRESSOR) 50 mg tablet Take 1 tablet (50 mg total) by mouth every 12 (twelve) hours 12/6/24    "Gwen Brown, DO   Multiple Vitamins-Minerals (Centrum Silver 50+Men) TABS     Historical Provider, MD   polyethylene glycol (MIRALAX) 17 g packet Take 17 g by mouth daily 9/4/24   Cari Sarabia MD   rosuvastatin (CRESTOR) 40 MG tablet TAKE 1 TABLET BY MOUTH EVERY DAY IN THE EVENING 11/8/24   Gwen Clifton, DO       Allergies:  Allergies   Allergen Reactions   • Lisinopril Cough       Review of Systems:     Review of Systems   Constitutional:  Negative for activity change, chills, diaphoresis and fatigue.   HENT:  Negative for dental problem, nosebleeds and rhinorrhea.    Eyes:  Negative for pain and visual disturbance.   Respiratory:  Negative for cough, chest tightness and shortness of breath.    Cardiovascular:  Negative for chest pain, palpitations and leg swelling.   Gastrointestinal:  Negative for blood in stool, nausea and vomiting.   Genitourinary:  Negative for dysuria, flank pain and hematuria.   Musculoskeletal:  Positive for gait problem and neck pain. Negative for arthralgias and joint swelling.   Skin:  Positive for wound. Negative for color change, pallor and rash.   Neurological:  Negative for seizures, syncope, weakness, light-headedness and numbness.   Psychiatric/Behavioral:  Negative for confusion and hallucinations. The patient is not nervous/anxious.        Vital Signs:     Vitals:    12/17/24 1339 12/17/24 1342   BP: 132/69 143/73   BP Location: Right arm Left arm   Patient Position: Sitting Sitting   Cuff Size: Large Large   Pulse: 64    SpO2: 96%    Weight: 133 kg (293 lb 11.2 oz)    Height: 6' 3\" (1.905 m)        Physical Exam:     Physical Exam  Constitutional:       General: He is not in acute distress.     Appearance: Normal appearance. He is not diaphoretic.   HENT:      Head: Normocephalic and atraumatic.      Right Ear: External ear normal.      Left Ear: External ear normal.      Nose: Nose normal.   Eyes:      General:         Right eye: No discharge.         Left eye: No " discharge.   Neck:      Vascular: No carotid bruit.   Cardiovascular:      Rate and Rhythm: Normal rate and regular rhythm.      Heart sounds: Murmur heard.      No friction rub.   Pulmonary:      Effort: Pulmonary effort is normal.      Breath sounds: Normal breath sounds. No wheezing, rhonchi or rales.   Abdominal:      General: Abdomen is flat. There is no distension.      Palpations: Abdomen is soft.      Tenderness: There is no abdominal tenderness.   Musculoskeletal:         General: No deformity or signs of injury.      Cervical back: No rigidity. No muscular tenderness.      Right lower leg: No edema.      Left lower leg: No edema.   Skin:     General: Skin is warm and dry.      Capillary Refill: Capillary refill takes less than 2 seconds.      Findings: No rash.   Neurological:      General: No focal deficit present.      Mental Status: He is alert and oriented to person, place, and time.      Gait: Gait abnormal.   Psychiatric:         Mood and Affect: Mood normal.         Behavior: Behavior normal.         Thought Content: Thought content normal.         Judgment: Judgment normal.         Lab Results:     Results from last 7 days   Lab Units 12/13/24  1847   WBC Thousand/uL 8.87   HEMOGLOBIN g/dL 14.3   HEMATOCRIT % 47.0   PLATELETS Thousands/uL 174     Results from last 7 days   Lab Units 12/13/24  1847   POTASSIUM mmol/L 3.9   CHLORIDE mmol/L 98   CO2 mmol/L 33*   BUN mg/dL 23   CREATININE mg/dL 1.11   CALCIUM mg/dL 10.2         Lab Results   Component Value Date    HGBA1C 5.5 10/08/2024     Lab Results   Component Value Date    CKTOTAL 22 (L) 08/24/2024    TROPONINI <0.02 07/19/2021       Imaging Studies:     CT Chest:  11/18/24  1. Interval progression of retroperitoneal, pelvic and mesenteric adenopathy with mild increase in size of lymph nodes as described above. Consider follow-up PET/CT to assess if nodes are hypermetabolic.  2. Almost complete resolution of left effusion with resolution of  pericardial effusion and stable right pleural effusion.  3. Aneurysmal dilatation of the ascending thoracic aorta measuring 46 mm. Surgical consultation recommended.  4. Slightly complex cystic lesion at the lower pole of the right kidney with a punctate wall calcification. Consider correlation with renal ultrasound or contrast-enhanced MRI.    Echocardiogram: 8/27/24  •  Left Ventricle: Left ventricular cavity size is normal. Wall thickness is moderately increased visually, but not well visualized for accurate measurement. The left ventricular ejection fraction is 50%. Systolic function is low normal. Although no diagnostic regional wall motion abnormality was identified, this possibility cannot be completely excluded on the basis of this study.  •  Right Ventricle: Right ventricular cavity size is normal. Systolic function is mildly reduced.  •  Left Atrium: The atrium is normal in size.  •  Right Atrium: The atrium is normal in size.  •  Mitral Valve: There is moderate focal calcification of the anterior leaflet and posterior leaflet involving the leaflet margin more than the base. There is annular calcification. There is mild regurgitation.  •  Pericardium: There is a moderate pericardial effusion circumferential to the heart. The fluid exhibits a fibrinous appearance. The largest diameter measures 1.3 cm. There is no echocardiographic evidence of tamponade. The evidence against tamponade includes: no right ventricular diastolic collapse, no right ventricular compression, no right atrial inversion. There is mild respiratory variation, although this is likely secondary to atrial fibrillation.  •  Prior TTE study available for comparison. Prior study date: 8/24/2024. No significant changes noted compared to the prior study.      Results Review Statement: I personally reviewed the following image studies in PACS and associated radiology reports: CT chest and Echocardiogram. My interpretation of the radiology  images/reports is: as above.    Assessment:  Ascending aortic aneurysm; Ongoing ascending aortic replacement workup    Plan:    CT chest/abdomen/pelvis with contrast performed prior to the visit today was reviewed.  Radiographic findings of ascending aorta aneurysm measuring 46 mm at it's greatest diameter, were confirmed and shared with the patient today.    At this time, patient should recover from cervical OM. Aneurysm has been stable since 2021. Recommend repeat imaging/follow-up in ~1 year.    Augustus Coffman was comfortable with our recommendations, and their questions were answered to their satisfaction.  Thank you for allowing us to participate in the care of this patient.     Aortic Aneurysm Instructions were provided to the patient as follows:    1. No heavy sustained lifting which would require excessive straining  2. Maintain a controlled blood pressure with a goal of 120/80.  3. Follow up in [facility] as recommended with radiology follow up as instructed  4. Report to the ER or call 911 immediately with the following signs / symptoms: sudden onset of back pain, chest pain or shortness of breath.    The patient recently had a screening colonoscopy in 6/28/21.  Therefore GI referral is not indicated at this time.     SIGNATURE: Sandi Prakash PA-C  DATE: 12/17/24  TIME: 2:29 PM    * This note was completed in part utilizing [employer] direct voice recognition software.   Grammatical errors, random word insertion, spelling mistakes, and incomplete sentences may be an occasional consequence of the system secondary to software limitations, ambient noise and hardware issues. At the time of dictation, efforts were made to edit, clarify and /or correct errors. Please read the chart carefully and recognize, using context, where substitutions have occurred.  If you have any questions or concerns about the context, text or information contained within the body of this dictation, please contact myself,  the provider, for further clarification.   Attestation signed by Jonathan Donahue MD at 12/17/2024  2:34 PM:  Attending Attestation:    I supervised the Advanced Practitioner.? In addition to personally performing portions of the history and physical exam, I performed, in its entirety, the assessment/plan/medical decision making/counseling/care coordination component of the visit on 12/17/24.  I reviewed the Advanced Practitioner's note,  medications prescribed and orders placed.    Medical decision making is detailed below:  Mr. Coffman is a 69yM with h/o afib, obesity, CKD3, lymphoma, diabetes, foot infection, cervical spine infection - who is referred to the office for surveillance of known ascending aortic aneurysm. Patient has scans going back a few years that show a stable ascending dilation. I personally reviewed his most recent CT, which shows ascending measuring 4.6cm in max diameter. He also has moderate aortic valve stenosis per recent TTE. He denies any significant family hx of aneurysm and denies any cardiac symptoms. I explained the nature of aortic aneurysms to Mr. Coffman and his wife and explained weight lifting restrictions. Since his aneurysm has been stable for a number of years, we will move to yearly surveillance imaging. I suspect he will have a recent CT scan around this time next year (with or without contrast is ok), but if he does not, we will order at that time.     Jonathan Donahue MD  12/17/24  2:31 PM

## 2024-12-17 NOTE — PROGRESS NOTES
Consultation - Cardiac Surgery   Augustus Coffman 69 y.o. male MRN: [id number]    Physician Requesting Consult: Gwen Lazo DO     Reason for Consult / Principal Problem: Ascending aortic aneurysm    History of Present Illness: Augustus Coffman is a 69 y.o. year old male with significant PMH including HTN, HLD, ascending aortic aneurysm, mod. AS- trileaflet, pericarditis, pericardial effusion (seen in August 2024, no intervention), PAF (dx in August of 2024, on Eliquis), obesity (BMI 37), CKD3, follicular lymphoma (dx 2 yrs ago, chemo, no radiation, mild increase in lymph nodes, next CT scan in May), MSSA bacteremia likely from hardware in foot (follows with podiatry, in boot), cervical spine OM (follows with neurosurgery, likely will need surgery for kyphosis once completes antibx, follows with ID on PO duricef, wearing collar when in car), cyst on kidney (U/S workup pending), NIDDM2, hx of GI bleed (patient denies, likely hemorrhoids) who presents today for surgical consultation for ascending aortic aneurysm. In review, patient has been following with oncology for lymphoma, CTA (5/28/21) first noting aneurysm measuring 4.5 cm.    Patient relays story above. Patient is doing well, continues with issues from his neck w/ limited ROM. He is ambulating without assistance but does have boot on foot. Denies CP, palpitations, SOB, JUAREZ, lightheaded/dizziness, syncope, numbness/paraesthesias, melena, weight gain, or LE edema.       Past Medical History:  Past Medical History:   Diagnosis Date    Aneurysm (HCC)     Arthritis 2010's    Occasionally flares up    Cancer (HCC) May 2021    Still investigating    Chronic kidney disease     Diabetes mellitus (HCC)     Follicular lymphoma (HCC)     GERD (gastroesophageal reflux disease) June 2021    Noticed in an Endoscopy    Heart murmur May 2020    High cholesterol     Hypertension     Kidney stone 1980's    Have had since 1980's    Obesity Since Childhood       Past  Surgical History:   Past Surgical History:   Procedure Laterality Date    BUNIONECTOMY Right 11/24/2020    Procedure: RIGHT HAV CORRECTION,;  Surgeon: Niranjan Child DPM;  [facility];  Service: Podiatry    COLONOSCOPY      INCISION AND DRAINAGE OF WOUND Right 10/05/2016    Procedure: INCISION AND DRAINAGE (I&D) EXTREMITY;  Surgeon: Niranjan Child DPM;  [facility];  Service:     IR BIOPSY LYMPH NODE  07/08/2021    IR EMBOLIZATION (SPECIFY VESSEL OR SITE)  07/08/2021    IR PORT PLACEMENT  09/01/2022    IR TUNNELED CENTRAL LINE PLACEMENT  08/29/2024    IR TUNNELED CENTRAL LINE REMOVAL  10/09/2024    LIVER BIOPSY  7/8/2021    PILONIDAL CYST EXCISION      KY CORRECTION HAMMERTOE Right 02/19/2019    Procedure: THIRD HAMMER TOE CORRECTION;  Surgeon: Niranjan Child DPM;  [facility];  Service: Podiatry    KY RMVL MATEO CTR VAD W/SUBQ PORT/ CTR/PRPH INSJ N/A 03/14/2023    Procedure: REMOVAL VENOUS PORT (PORT-A-CATH)IR;  Surgeon: Avelino Vázquez DO;  [facility];  Service: Interventional Radiology    TOE OSTEOTOMY Right 03/14/2017    Procedure: HAMMERTOE CORRECTION R 2 ;  Surgeon: Niranjan Child DPM;  [facility];  Service:     TOE OSTEOTOMY Left 11/24/2020    Procedure: LEFT HT CORRECTION TOE;  Surgeon: Niranjan Child DPM;  [facility];  Service: Podiatry    TONSILLECTOMY  1963    UPPER GASTROINTESTINAL ENDOSCOPY  8/15/2022         Family History:  Family History   Problem Relation Age of Onset    Cancer Father         Leukemia         Social History:    Social History     Substance and Sexual Activity   Alcohol Use Never     Social History     Substance and Sexual Activity   Drug Use Never     Social History     Tobacco Use   Smoking Status Never   Smokeless Tobacco Never   Tobacco Comments    Never smoked but exposed to second hand smoke from birth until 1980's       Marital Status: [unfilled]    Home Medications:   Prior to Admission medications    Medication Sig  Start Date End Date Taking? Authorizing Provider   acetaminophen (TYLENOL) 325 mg tablet Take 3 tablets (975 mg total) by mouth every 8 (eight) hours as needed for mild pain or moderate pain  Patient not taking: Reported on 12/4/2024 9/8/24   May Altamirano PA-C   amiodarone 200 mg tablet Take 1 tablet (200 mg total) by mouth daily 10/24/24   Gwen Lazo DO   apixaban (ELIQUIS) 5 mg Take 1 tablet (5 mg total) by mouth 2 (two) times a day 10/4/24   May Altamirano PA-C   cefadroxil (DURICEF) 500 mg capsule Take 1 capsule by mouth 2 (two) times a day 11/9/24   Historical Provider, MD   Cholecalciferol 100 MCG (4000 UT) CAPS Take 1.25 capsules (5,000 Units total) by mouth in the morning 9/23/24   Jorden Heath MD   furosemide (LASIX) 20 mg tablet TAKE 1 TABLET BY MOUTH EVERY DAY 10/31/24   Gwen Lazo DO   losartan (COZAAR) 25 mg tablet Take 1 tablet (25 mg total) by mouth daily 11/13/24   Gwen Lazo DO   metFORMIN (GLUCOPHAGE) 500 mg tablet TAKE 2 TABLETS BY MOUTH TWICE A DAY WITH FOOD 11/8/24   Gwen Lazo DO   methocarbamol (ROBAXIN) 750 mg tablet Take 1 tablet (750 mg total) by mouth every 6 (six) hours as needed for muscle spasms for up to 14 days 9/9/24 12/12/24  Ashley Depadua, MD   metoprolol tartrate (LOPRESSOR) 50 mg tablet Take 1 tablet (50 mg total) by mouth every 12 (twelve) hours 12/6/24   Gwen Lazo DO   Multiple Vitamins-Minerals (Centrum Silver 50+Men) TABS     Historical Provider, MD   polyethylene glycol (MIRALAX) 17 g packet Take 17 g by mouth daily 9/4/24   Cari Sarabia MD   rosuvastatin (CRESTOR) 40 MG tablet TAKE 1 TABLET BY MOUTH EVERY DAY IN THE EVENING 11/8/24   Gwen Lazo DO       Allergies:  Allergies   Allergen Reactions    Lisinopril Cough       Review of Systems:     Review of Systems   Constitutional:  Negative for activity change, chills, diaphoresis and fatigue.   HENT:  Negative for dental problem, nosebleeds and rhinorrhea.    Eyes:   "Negative for pain and visual disturbance.   Respiratory:  Negative for cough, chest tightness and shortness of breath.    Cardiovascular:  Negative for chest pain, palpitations and leg swelling.   Gastrointestinal:  Negative for blood in stool, nausea and vomiting.   Genitourinary:  Negative for dysuria, flank pain and hematuria.   Musculoskeletal:  Positive for gait problem and neck pain. Negative for arthralgias and joint swelling.   Skin:  Positive for wound. Negative for color change, pallor and rash.   Neurological:  Negative for seizures, syncope, weakness, light-headedness and numbness.   Psychiatric/Behavioral:  Negative for confusion and hallucinations. The patient is not nervous/anxious.        Vital Signs:     Vitals:    12/17/24 1339 12/17/24 1342   BP: 132/69 143/73   BP Location: Right arm Left arm   Patient Position: Sitting Sitting   Cuff Size: Large Large   Pulse: 64    SpO2: 96%    Weight: 133 kg (293 lb 11.2 oz)    Height: 6' 3\" (1.905 m)        Physical Exam:     Physical Exam  Constitutional:       General: He is not in acute distress.     Appearance: Normal appearance. He is not diaphoretic.   HENT:      Head: Normocephalic and atraumatic.      Right Ear: External ear normal.      Left Ear: External ear normal.      Nose: Nose normal.   Eyes:      General:         Right eye: No discharge.         Left eye: No discharge.   Neck:      Vascular: No carotid bruit.   Cardiovascular:      Rate and Rhythm: Normal rate and regular rhythm.      Heart sounds: Murmur heard.      No friction rub.   Pulmonary:      Effort: Pulmonary effort is normal.      Breath sounds: Normal breath sounds. No wheezing, rhonchi or rales.   Abdominal:      General: Abdomen is flat. There is no distension.      Palpations: Abdomen is soft.      Tenderness: There is no abdominal tenderness.   Musculoskeletal:         General: No deformity or signs of injury.      Cervical back: No rigidity. No muscular tenderness.      Right " lower leg: No edema.      Left lower leg: No edema.   Skin:     General: Skin is warm and dry.      Capillary Refill: Capillary refill takes less than 2 seconds.      Findings: No rash.   Neurological:      General: No focal deficit present.      Mental Status: He is alert and oriented to person, place, and time.      Gait: Gait abnormal.   Psychiatric:         Mood and Affect: Mood normal.         Behavior: Behavior normal.         Thought Content: Thought content normal.         Judgment: Judgment normal.         Lab Results:     Results from last 7 days   Lab Units 12/13/24  1847   WBC Thousand/uL 8.87   HEMOGLOBIN g/dL 14.3   HEMATOCRIT % 47.0   PLATELETS Thousands/uL 174     Results from last 7 days   Lab Units 12/13/24  1847   POTASSIUM mmol/L 3.9   CHLORIDE mmol/L 98   CO2 mmol/L 33*   BUN mg/dL 23   CREATININE mg/dL 1.11   CALCIUM mg/dL 10.2         Lab Results   Component Value Date    HGBA1C 5.5 10/08/2024     Lab Results   Component Value Date    CKTOTAL 22 (L) 08/24/2024    TROPONINI <0.02 07/19/2021       Imaging Studies:     CT Chest:  11/18/24  1. Interval progression of retroperitoneal, pelvic and mesenteric adenopathy with mild increase in size of lymph nodes as described above. Consider follow-up PET/CT to assess if nodes are hypermetabolic.  2. Almost complete resolution of left effusion with resolution of pericardial effusion and stable right pleural effusion.  3. Aneurysmal dilatation of the ascending thoracic aorta measuring 46 mm. Surgical consultation recommended.  4. Slightly complex cystic lesion at the lower pole of the right kidney with a punctate wall calcification. Consider correlation with renal ultrasound or contrast-enhanced MRI.    Echocardiogram: 8/27/24    Left Ventricle: Left ventricular cavity size is normal. Wall thickness is moderately increased visually, but not well visualized for accurate measurement. The left ventricular ejection fraction is 50%. Systolic function is low  normal. Although no diagnostic regional wall motion abnormality was identified, this possibility cannot be completely excluded on the basis of this study.    Right Ventricle: Right ventricular cavity size is normal. Systolic function is mildly reduced.    Left Atrium: The atrium is normal in size.    Right Atrium: The atrium is normal in size.    Mitral Valve: There is moderate focal calcification of the anterior leaflet and posterior leaflet involving the leaflet margin more than the base. There is annular calcification. There is mild regurgitation.    Pericardium: There is a moderate pericardial effusion circumferential to the heart. The fluid exhibits a fibrinous appearance. The largest diameter measures 1.3 cm. There is no echocardiographic evidence of tamponade. The evidence against tamponade includes: no right ventricular diastolic collapse, no right ventricular compression, no right atrial inversion. There is mild respiratory variation, although this is likely secondary to atrial fibrillation.    Prior TTE study available for comparison. Prior study date: 8/24/2024. No significant changes noted compared to the prior study.      Results Review Statement: I personally reviewed the following image studies in PACS and associated radiology reports: CT chest and Echocardiogram. My interpretation of the radiology images/reports is: as above.    Assessment:  Ascending aortic aneurysm; Ongoing ascending aortic replacement workup    Plan:    CT chest/abdomen/pelvis with contrast performed prior to the visit today was reviewed.  Radiographic findings of ascending aorta aneurysm measuring 46 mm at it's greatest diameter, were confirmed and shared with the patient today.    At this time, patient should recover from cervical OM. Aneurysm has been stable since 2021. Recommend repeat imaging/follow-up in ~1 year.    Augustus Coffman was comfortable with our recommendations, and their questions were answered to their  satisfaction.  Thank you for allowing us to participate in the care of this patient.     Aortic Aneurysm Instructions were provided to the patient as follows:    1. No heavy sustained lifting which would require excessive straining  2. Maintain a controlled blood pressure with a goal of 120/80.  3. Follow up in [facility] as recommended with radiology follow up as instructed  4. Report to the ER or call 911 immediately with the following signs / symptoms: sudden onset of back pain, chest pain or shortness of breath.    The patient recently had a screening colonoscopy in 6/28/21.  Therefore GI referral is not indicated at this time.     SIGNATURE: Sandi Prakash PA-C  DATE: 12/17/24  TIME: 2:29 PM    * This note was completed in part utilizing [employer] direct voice recognition software.   Grammatical errors, random word insertion, spelling mistakes, and incomplete sentences may be an occasional consequence of the system secondary to software limitations, ambient noise and hardware issues. At the time of dictation, efforts were made to edit, clarify and /or correct errors. Please read the chart carefully and recognize, using context, where substitutions have occurred.  If you have any questions or concerns about the context, text or information contained within the body of this dictation, please contact myself, the provider, for further clarification.

## 2024-12-18 ENCOUNTER — APPOINTMENT (OUTPATIENT)
Dept: LAB | Facility: HOSPITAL | Age: 69
End: 2024-12-18
Attending: INTERNAL MEDICINE
Payer: MEDICARE

## 2024-12-18 DIAGNOSIS — M46.22 ACUTE OSTEOMYELITIS OF CERVICAL SPINE (HCC): ICD-10-CM

## 2024-12-18 DIAGNOSIS — R78.81 MSSA BACTEREMIA: ICD-10-CM

## 2024-12-18 DIAGNOSIS — B95.61 MSSA BACTEREMIA: ICD-10-CM

## 2024-12-18 LAB
BASOPHILS # BLD AUTO: 0.08 THOUSANDS/ÂΜL (ref 0–0.1)
BASOPHILS NFR BLD AUTO: 1 % (ref 0–1)
CREAT SERPL-MCNC: 1.32 MG/DL (ref 0.6–1.3)
CRP SERPL QL: 4.1 MG/L
EOSINOPHIL # BLD AUTO: 0.32 THOUSAND/ÂΜL (ref 0–0.61)
EOSINOPHIL NFR BLD AUTO: 5 % (ref 0–6)
ERYTHROCYTE [DISTWIDTH] IN BLOOD BY AUTOMATED COUNT: 16.3 % (ref 11.6–15.1)
ERYTHROCYTE [SEDIMENTATION RATE] IN BLOOD: 14 MM/HOUR (ref 0–19)
GFR SERPL CREATININE-BSD FRML MDRD: 55 ML/MIN/1.73SQ M
HCT VFR BLD AUTO: 43 % (ref 36.5–49.3)
HGB BLD-MCNC: 13 G/DL (ref 12–17)
IMM GRANULOCYTES # BLD AUTO: 0.03 THOUSAND/UL (ref 0–0.2)
IMM GRANULOCYTES NFR BLD AUTO: 0 % (ref 0–2)
LYMPHOCYTES # BLD AUTO: 0.65 THOUSANDS/ÂΜL (ref 0.6–4.47)
LYMPHOCYTES NFR BLD AUTO: 10 % (ref 14–44)
MCH RBC QN AUTO: 25.6 PG (ref 26.8–34.3)
MCHC RBC AUTO-ENTMCNC: 30.2 G/DL (ref 31.4–37.4)
MCV RBC AUTO: 85 FL (ref 82–98)
MONOCYTES # BLD AUTO: 0.51 THOUSAND/ÂΜL (ref 0.17–1.22)
MONOCYTES NFR BLD AUTO: 8 % (ref 4–12)
NEUTROPHILS # BLD AUTO: 5.23 THOUSANDS/ÂΜL (ref 1.85–7.62)
NEUTS SEG NFR BLD AUTO: 76 % (ref 43–75)
NRBC BLD AUTO-RTO: 0 /100 WBCS
PLATELET # BLD AUTO: 151 THOUSANDS/UL (ref 149–390)
PMV BLD AUTO: 10.4 FL (ref 8.9–12.7)
RBC # BLD AUTO: 5.07 MILLION/UL (ref 3.88–5.62)
WBC # BLD AUTO: 6.82 THOUSAND/UL (ref 4.31–10.16)

## 2024-12-18 PROCEDURE — 85652 RBC SED RATE AUTOMATED: CPT

## 2024-12-18 PROCEDURE — 85025 COMPLETE CBC W/AUTO DIFF WBC: CPT

## 2024-12-18 PROCEDURE — 86140 C-REACTIVE PROTEIN: CPT

## 2024-12-18 PROCEDURE — 36415 COLL VENOUS BLD VENIPUNCTURE: CPT

## 2024-12-18 PROCEDURE — 82565 ASSAY OF CREATININE: CPT

## 2024-12-19 ENCOUNTER — OFFICE VISIT (OUTPATIENT)
Dept: INFECTIOUS DISEASES | Facility: CLINIC | Age: 69
End: 2024-12-19
Payer: MEDICARE

## 2024-12-19 VITALS
RESPIRATION RATE: 18 BRPM | DIASTOLIC BLOOD PRESSURE: 78 MMHG | WEIGHT: 292 LBS | SYSTOLIC BLOOD PRESSURE: 148 MMHG | HEART RATE: 65 BPM | TEMPERATURE: 97.7 F | HEIGHT: 75 IN | BODY MASS INDEX: 36.31 KG/M2 | OXYGEN SATURATION: 100 %

## 2024-12-19 DIAGNOSIS — M46.22 ACUTE OSTEOMYELITIS OF CERVICAL SPINE (HCC): ICD-10-CM

## 2024-12-19 DIAGNOSIS — B95.61 MSSA BACTEREMIA: ICD-10-CM

## 2024-12-19 DIAGNOSIS — E11.22 TYPE 2 DIABETES MELLITUS WITH STAGE 3 CHRONIC KIDNEY DISEASE, WITHOUT LONG-TERM CURRENT USE OF INSULIN, UNSPECIFIED WHETHER STAGE 3A OR 3B CKD (HCC): Primary | ICD-10-CM

## 2024-12-19 DIAGNOSIS — R78.81 MSSA BACTEREMIA: ICD-10-CM

## 2024-12-19 DIAGNOSIS — E66.9 OBESITY WITH BODY MASS INDEX 30 OR GREATER: ICD-10-CM

## 2024-12-19 DIAGNOSIS — N18.30 TYPE 2 DIABETES MELLITUS WITH STAGE 3 CHRONIC KIDNEY DISEASE, WITHOUT LONG-TERM CURRENT USE OF INSULIN, UNSPECIFIED WHETHER STAGE 3A OR 3B CKD (HCC): Primary | ICD-10-CM

## 2024-12-19 PROCEDURE — G2211 COMPLEX E/M VISIT ADD ON: HCPCS | Performed by: INTERNAL MEDICINE

## 2024-12-19 PROCEDURE — 99214 OFFICE O/P EST MOD 30 MIN: CPT | Performed by: INTERNAL MEDICINE

## 2024-12-19 NOTE — ASSESSMENT & PLAN NOTE
C5-C6 discitis/osteomyelitis with epidural phlegmon/abscess.  Found on outpatient imaging.  As a complication of MSSA bacteremia.  Most recent MRI C-spine 10/31/2024 with substantial improvement and resolution of phlegmon.  Inflammatory markers have appropriately trended down with treatment and have now normalized.   Discontinue antibiotic as inflammatory markers have normalized   Discussed with patient potential symptoms that may be associated with relapsing infection that he should look out for in the future.   Follow-up with neurosurgery as scheduled later this month.   Follow-up with us on an as-needed basis moving forward.

## 2024-12-19 NOTE — ASSESSMENT & PLAN NOTE
Felt to be primary right foot source with hardware present.  Complicated by cervical spine infection, as above.  Transthoracic echocardiogram without valvular vegetation but limited study and too high risk to obtain LUCRECIA.  Bacteremia cleared.  Completed 6 weeks of intravenous cefazolin on 10/2/2024 and then transitioned to oral cefadroxil.

## 2024-12-19 NOTE — PROGRESS NOTES
Name: Augustus Coffman      : 1955      MRN: [id number]  Encounter Provider: Leanne Coyne DO  Encounter Date: 2024   Encounter department: [facility] [place]  :  Assessment & Plan  Acute osteomyelitis of cervical spine (HCC)  C5-C6 discitis/osteomyelitis with epidural phlegmon/abscess.  Found on outpatient imaging.  As a complication of MSSA bacteremia.  Most recent MRI C-spine 10/31/2024 with substantial improvement and resolution of phlegmon.  Inflammatory markers have appropriately trended down with treatment and have now normalized.   Discontinue antibiotic as inflammatory markers have normalized   Discussed with patient potential symptoms that may be associated with relapsing infection that he should look out for in the future.   Follow-up with neurosurgery as scheduled later this month.   Follow-up with us on an as-needed basis moving forward.    MSSA bacteremia  [place] to be primary right foot source with hardware present.  Complicated by cervical spine infection, as above.  Transthoracic echocardiogram without valvular vegetation but limited study and too high risk to obtain LUCRECIA.  Bacteremia cleared.  Completed 6 weeks of intravenous cefazolin on 10/2/2024 and then transitioned to oral cefadroxil.    Type 2 diabetes mellitus with stage 3 chronic kidney disease, without long-term current use of insulin, unspecified whether stage 3a or 3b CKD ([facility])    Lab Results   Component Value Date    HGBA1C 5.5 10/08/2024            Obesity with body mass index 30 or greater  Continue to stress lifestyle modification as this is a risk factor for recurrent infection.          Antibiotics: Cefadroxil    I discussed above plan with patient and family at bedside and answered all questions.  Follow-up on an as-needed basis.        History of Present Illness   Patient is here for regular follow-up.  Remains on oral antibiotic which she is tolerating without issue.  Denies neck pain  but has ongoing stiffness and is due to see neurosurgery later this month to discuss options as he states he will ultimately need surgery.  No fevers, chills or new focal complaints.    Reviewed and updated as appropriate: Past medical history, past surgical history, social history, current medications, allergies, problem list.    ROS:  A complete review of systems is negative other than that noted above in the HPI.    Pertinent Medical History   Medical History Reviewed by provider this encounter:     .  Past Medical History   Past Medical History:   Diagnosis Date    Aneurysm (HCC)     Arthritis 2010's    Occasionally flares up    Cancer (HCC) May 2021    Still investigating    Chronic kidney disease     Diabetes mellitus (HCC)     Follicular lymphoma (HCC)     GERD (gastroesophageal reflux disease) June 2021    Noticed in an Endoscopy    Heart murmur May 2020    High cholesterol     Hypertension     Kidney stone 1980's    Have had since 1980's    Obesity Since Childhood     Past Surgical History:   Procedure Laterality Date    BUNIONECTOMY Right 11/24/2020    Procedure: RIGHT HAV CORRECTION,;  Surgeon: Niranjan Child DPM;  [facility];  Service: Podiatry    COLONOSCOPY      INCISION AND DRAINAGE OF WOUND Right 10/05/2016    Procedure: INCISION AND DRAINAGE (I&D) EXTREMITY;  Surgeon: Niranjan Child DPM;  [facility];  Service:     IR BIOPSY LYMPH NODE  07/08/2021    IR EMBOLIZATION (SPECIFY VESSEL OR SITE)  07/08/2021    IR PORT PLACEMENT  09/01/2022    IR TUNNELED CENTRAL LINE PLACEMENT  08/29/2024    IR TUNNELED CENTRAL LINE REMOVAL  10/09/2024    LIVER BIOPSY  7/8/2021    PILONIDAL CYST EXCISION      [place] CORRECTION HAMMERTOE Right 02/19/2019    Procedure: THIRD HAMMER TOE CORRECTION;  Surgeon: Niranjan Child DPM;  [facility];  Service: Podiatry    MO RMVL MATEO CTR VAD W/SUBQ PORT/ CTR/PRPH INSJ N/A 03/14/2023    Procedure: REMOVAL VENOUS PORT (PORT-A-CATH)IR;  Surgeon: Avelino Vázquez,  DO;  [facility];  Service: Interventional Radiology    TOE OSTEOTOMY Right 03/14/2017    Procedure: HAMMERTOE CORRECTION R 2 ;  Surgeon: Niranjan Child DPM;  [facility];  Service:     TOE OSTEOTOMY Left 11/24/2020    Procedure: LEFT HT CORRECTION TOE;  Surgeon: Niranjan Child DPM;  [facility];  Service: Podiatry    TONSILLECTOMY  1963    UPPER GASTROINTESTINAL ENDOSCOPY  8/15/2022     Family History   Problem Relation Age of Onset    Cancer Father         Leukemia      reports that he has never smoked. He has never used smokeless tobacco. He reports that he does not drink alcohol and does not use drugs.  Current Outpatient Medications on File Prior to Visit   Medication Sig Dispense Refill    amiodarone 200 mg tablet Take 1 tablet (200 mg total) by mouth daily 90 tablet 0    apixaban (ELIQUIS) 5 mg Take 1 tablet (5 mg total) by mouth 2 (two) times a day 60 tablet 5    cefadroxil (DURICEF) 500 mg capsule Take 1 capsule by mouth 2 (two) times a day      Cholecalciferol 100 MCG (4000 UT) CAPS Take 1.25 capsules (5,000 Units total) by mouth in the morning      furosemide (LASIX) 20 mg tablet TAKE 1 TABLET BY MOUTH EVERY DAY 90 tablet 1    losartan (COZAAR) 25 mg tablet Take 1 tablet (25 mg total) by mouth daily 90 tablet 1    metFORMIN (GLUCOPHAGE) 500 mg tablet TAKE 2 TABLETS BY MOUTH TWICE A DAY WITH FOOD 360 tablet 1    metoprolol tartrate (LOPRESSOR) 50 mg tablet Take 1 tablet (50 mg total) by mouth every 12 (twelve) hours 200 tablet 1    Multiple Vitamins-Minerals (Centrum Silver 50+Men) TABS       rosuvastatin (CRESTOR) 40 MG tablet TAKE 1 TABLET BY MOUTH EVERY DAY IN THE EVENING 90 tablet 1    acetaminophen (TYLENOL) 325 mg tablet Take 3 tablets (975 mg total) by mouth every 8 (eight) hours as needed for mild pain or moderate pain (Patient not taking: Reported on 12/4/2024)      methocarbamol (ROBAXIN) 750 mg tablet Take 1 tablet (750 mg total) by mouth every 6 (six) hours as needed  for muscle spasms for up to 14 days 56 tablet 0    polyethylene glycol (MIRALAX) 17 g packet Take 17 g by mouth daily (Patient not taking: Reported on 12/17/2024)       Current Facility-Administered Medications on File Prior to Visit   Medication Dose Route Frequency Provider Last Rate Last Admin    lidocaine (LMX) 4 % cream   Topical Once Susie Luu DPM         Allergies   Allergen Reactions    Lisinopril Cough      Current Outpatient Medications on File Prior to Visit   Medication Sig Dispense Refill    amiodarone 200 mg tablet Take 1 tablet (200 mg total) by mouth daily 90 tablet 0    apixaban (ELIQUIS) 5 mg Take 1 tablet (5 mg total) by mouth 2 (two) times a day 60 tablet 5    cefadroxil (DURICEF) 500 mg capsule Take 1 capsule by mouth 2 (two) times a day      Cholecalciferol 100 MCG (4000 UT) CAPS Take 1.25 capsules (5,000 Units total) by mouth in the morning      furosemide (LASIX) 20 mg tablet TAKE 1 TABLET BY MOUTH EVERY DAY 90 tablet 1    losartan (COZAAR) 25 mg tablet Take 1 tablet (25 mg total) by mouth daily 90 tablet 1    metFORMIN (GLUCOPHAGE) 500 mg tablet TAKE 2 TABLETS BY MOUTH TWICE A DAY WITH FOOD 360 tablet 1    metoprolol tartrate (LOPRESSOR) 50 mg tablet Take 1 tablet (50 mg total) by mouth every 12 (twelve) hours 200 tablet 1    Multiple Vitamins-Minerals (Centrum Silver 50+Men) TABS       rosuvastatin (CRESTOR) 40 MG tablet TAKE 1 TABLET BY MOUTH EVERY DAY IN THE EVENING 90 tablet 1    acetaminophen (TYLENOL) 325 mg tablet Take 3 tablets (975 mg total) by mouth every 8 (eight) hours as needed for mild pain or moderate pain (Patient not taking: Reported on 12/4/2024)      methocarbamol (ROBAXIN) 750 mg tablet Take 1 tablet (750 mg total) by mouth every 6 (six) hours as needed for muscle spasms for up to 14 days 56 tablet 0    polyethylene glycol (MIRALAX) 17 g packet Take 17 g by mouth daily (Patient not taking: Reported on 12/17/2024)       Current Facility-Administered  "Medications on File Prior to Visit   Medication Dose Route Frequency Provider Last Rate Last Admin    lidocaine (LMX) 4 % cream   Topical Once Susie Luu DPM          Social History     Tobacco Use    Smoking status: Never    Smokeless tobacco: Never    Tobacco comments:     Never smoked but exposed to second hand smoke from birth until 1980's   Vaping Use    Vaping status: Never Used   Substance and Sexual Activity    Alcohol use: Never    Drug use: Never    Sexual activity: Not Currently     Partners: Female     Birth control/protection: Abstinence        Objective   /78   Pulse 65   Temp 97.7 °F (36.5 °C)   Resp 18   Ht 6' 3\" (1.905 m)   Wt 132 kg (292 lb)   SpO2 100%   BMI 36.50 kg/m²      General: Alert, interactive, appearing well, nontoxic, no acute distress.  Throat: Oropharynx moist without lesions.   Lungs: Clear to auscultation bilaterally; no audible wheezes, rhonchi or rales; respirations unlabored  Heart: RRR; no murmur, rub or gallop  Abdomen: Nondistended with no rigidity or guarding  Extremities: No clubbing, cyanosis or edema  Skin: No new rashes or lesions. No new draining wounds.  Neuro: Limited cervical range of motion.  Moves all extremities spontaneously.  No apparent focal deficits.    Lab Results: I have personally reviewed pertinent labs.  Lab Results   Component Value Date     10/06/2015    K 3.9 12/13/2024    CL 98 12/13/2024    CO2 33 (H) 12/13/2024    ANIONGAP 7 10/06/2015    BUN 23 12/13/2024    CREATININE 1.32 (H) 12/18/2024    GLUCOSE 217 (H) 07/08/2021    GLUF 110 (H) 10/15/2024    CALCIUM 10.2 12/13/2024    CORRECTEDCA 9.7 09/05/2024    AST 20 12/13/2024    ALT 17 12/13/2024    ALKPHOS 61 12/13/2024    PROT 6.8 10/06/2015    BILITOT 0.44 10/06/2015    EGFR 55 12/18/2024     Lab Results   Component Value Date    WBC 6.82 12/18/2024    HGB 13.0 12/18/2024    HCT 43.0 12/18/2024    MCV 85 12/18/2024     12/18/2024     Lab Results   Component " Value Date    ESR 14 12/18/2024

## 2024-12-23 ENCOUNTER — HOSPITAL ENCOUNTER (OUTPATIENT)
Dept: RADIOLOGY | Facility: HOSPITAL | Age: 69
Discharge: HOME | End: 2024-12-23
Payer: MEDICARE

## 2024-12-23 DIAGNOSIS — M46.22 ACUTE OSTEOMYELITIS OF CERVICAL SPINE (HCC): ICD-10-CM

## 2024-12-23 PROCEDURE — 72052 X-RAY EXAM NECK SPINE 6/>VWS: CPT

## 2024-12-26 ENCOUNTER — OFFICE VISIT (OUTPATIENT)
Dept: NEUROSURGERY | Facility: CLINIC | Age: 69
End: 2024-12-26
Payer: MEDICARE

## 2024-12-26 VITALS
SYSTOLIC BLOOD PRESSURE: 140 MMHG | HEIGHT: 75 IN | TEMPERATURE: 98.1 F | RESPIRATION RATE: 18 BRPM | OXYGEN SATURATION: 96 % | WEIGHT: 292 LBS | HEART RATE: 67 BPM | BODY MASS INDEX: 36.31 KG/M2 | DIASTOLIC BLOOD PRESSURE: 90 MMHG

## 2024-12-26 DIAGNOSIS — M46.22 ACUTE OSTEOMYELITIS OF CERVICAL SPINE (HCC): Primary | ICD-10-CM

## 2024-12-26 PROCEDURE — 99214 OFFICE O/P EST MOD 30 MIN: CPT | Performed by: NURSE PRACTITIONER

## 2024-12-26 NOTE — ASSESSMENT & PLAN NOTE
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  Hx MSSA bacteremia diagnosed 7/28 with 7 days of antibiotics.  Re-presented to hospital for treatment of above following results of outpatient MRI.  Continues on IV cefazolin through 10/2, was on oral duricef per ID.  ESR/CRP stabilized and patient recently saw ID on 12/19 when antibiotics were stopped  Patient is weaned out of cervical collar.  He denies any neck or arm pain.  He does complain of some neck stiffness.  No difficulty with fine motor skills or dropping objects.    Imaging reviewed with :  Cervical flex ex x-rays 12/23/2024:Stable changes of discitis/osteomyelitis at C5-6 with focal kyphosis.    Plan:  Reviewed x-ray imaging extensively with patient and wife.   No need for cervical collar.  Reviewed that patient unfortunately appears to have developed significant cervical kyphosis at level of discitis/osteomyelitis.  Fortunately he is not experiencing any concerning symptoms however we discussed that he may require surgical deformity correction at a later date.  This would only be considered once he has completed antibiotic therapy and infection has completely resolved.  He is not keen to proceed with any surgery however understands that it may become necessary.  Patient will follow-up in 3 months with repeat cervical flexion/extension x-rays to assess for any progression as joint appointment with  or sooner symptoms worsen  Patient made aware to seek care sooner if he develops any new or worsening neurological changes or red flag signs  Patient made aware to contact neurosurgery with any further question or concerns      Orders:    XR spine cervical complete 6+ vw flex/ext/obl; Future

## 2024-12-26 NOTE — PROGRESS NOTES
Name: Augustus Coffman      : 1955      MRN: [id number]  Encounter Provider: Master Sargent MD  Encounter Date: 2024   Encounter department: [facility] [place]  :  Assessment & Plan  Acute osteomyelitis of cervical spine (HCC)  Presents for follow up for C5-6 OM/discitis  Discovered on outpatient imaging  in setting of several weeks of neck pain.  Hx MSSA bacteremia diagnosed  with 7 days of antibiotics.  Re-presented to hospital for treatment of above following results of outpatient MRI.  Continues on IV cefazolin through 10/2, was on oral duricef per ID.  ESR/CRP stabilized and patient recently saw ID on  when antibiotics were stopped  Patient is weaned out of cervical collar.  He denies any neck or arm pain.  He does complain of some neck stiffness.  No difficulty with fine motor skills or dropping objects.    Imaging reviewed with :  Cervical flex ex x-rays 2024:Stable changes of discitis/osteomyelitis at C5-6 with focal kyphosis.    Plan:  Reviewed x-ray imaging extensively with patient and wife.   No need for cervical collar.  Reviewed that patient unfortunately appears to have developed significant cervical kyphosis at level of discitis/osteomyelitis.  Fortunately he is not experiencing any concerning symptoms however we discussed that he may require surgical deformity correction at a later date.  This would only be considered once he has completed antibiotic therapy and infection has completely resolved.  He is not keen to proceed with any surgery however understands that it may become necessary.  Patient will follow-up in 3 months with repeat cervical flexion/extension x-rays to assess for any progression as joint appointment with  or sooner symptoms worsen  Patient made aware to seek care sooner if he develops any new or worsening neurological changes or red flag signs  Patient made aware to contact neurosurgery with any further  question or concerns      Orders:    XR spine cervical complete 6+ vw flex/ext/obl; Future        History of Present Illness     Augustus Coffman is a 68 yo male with past medical history significant for diabetes, hypertension, hypercholesteremia, diabetic peripheral neuropathy, gout, obesity, chronic kidney disease, follicular lymphoma, A-fib, CHF and GERD.  Patient seen in outpatient office today for ongoing follow-up for C5-6 osteomyelitis/discitis.    Patient originally presented to the hospital on 7/23/2024 with complaints of neck pain with healing right foot ulcer.  He was diagnosed with MSSA bacteremia and placed on antibiotic therapy for 7 days.  He had ongoing neck pain that bothered him and ultimately he saw orthopedic doctor as an outpatient who ordered an MRI, this was completed on 8/23/2024 which indicated C5-6 acute osteomyelitis/discitis with prevertebral abscess, he was advised to go to the ER immediately.  Ultimately he was transferred to [facility] for further management.  He was started on IV cefazolin and placed in a cervical collar.    Patient was seen in the office on 9/26/2024 when imaging demonstrated development of significant cervical kyphosis at level of osteomyelitis/discitis.  Patient was not experiencing any concerning symptoms however it was discussed he may require surgical deformity correction at a later date which would only be considered once he has completed antibiotic therapy and infection has completely resolved, patient was not keen on proceeding with surgery but understands it may be necessary.  Patient completed 6-week course of IV cefazolin on 10/2/2024 then transition to oral cefadroxil and recently saw infectious disease on 12/19/2024 who stopped antibiotics as inflammatory markers normalized.    At last visit patient started weaning from his collar and is completely out.  He states he was doing well.  He denies any neck pain but does report some stiffness.  He  "denies any radiation into his arms.  He denies recent falls or traumas or difficulty with his balance.  He denies any difficulty with fine motor skills or dropping objects.  He does report wearing his collar in the car just as a safety precaution.  He offers no other complaints.    Reviewed imaging with patient and Dr. Sargent this is grossly stable.  He continues to have significant cervical kyphosis at level of osteomyelitis/discitis.  Again patient is not experiencing any concerning symptoms however again briefly discussed he may require surgical deformity correction at a later date.  Spoke with patient and he will follow-up in 3 months with repeat cervical flexion/extension x-rays to assess for any progression or sooner symptoms worsen    Review of Systems   HENT:  Negative for trouble swallowing.    Gastrointestinal: Negative.    Genitourinary: Negative.    Musculoskeletal:  Negative for gait problem.          Collar Only ie. Riding in car / hitting bump or pot hole   Skin:  Positive for wound (bottom of right foot).   Neurological:  Negative for seizures, weakness, numbness and headaches.   Hematological:         Eliquis     ROS reviewed with patient and agree and changes are made as needed     Objective   /90 (BP Location: Left arm)   Pulse 67   Temp 98.1 °F (36.7 °C) (Temporal)   Resp 18   Ht 6' 3\" (1.905 m)   Wt 132 kg (292 lb)   SpO2 96%   BMI 36.50 kg/m²     Physical Exam  Vitals reviewed.   Constitutional:       Appearance: Normal appearance.   HENT:      Head: Normocephalic and atraumatic.   Eyes:      Extraocular Movements: Extraocular movements intact.      Conjunctiva/sclera: Conjunctivae normal.      Pupils: Pupils are equal, round, and reactive to light.   Neck:      Comments: No midline TTP of cervical spine.  Some limiting ROM and neck stiffness especially when looking towards the ceiling.  Kyphosis deformity noted.  Cardiovascular:      Rate and Rhythm: Normal rate.   Pulmonary:     "  Effort: Pulmonary effort is normal. No respiratory distress.   Chest:      Chest wall: No tenderness.   Abdominal:      General: There is no distension.      Palpations: Abdomen is soft.      Tenderness: There is no abdominal tenderness.   Musculoskeletal:         General: Normal range of motion.      Cervical back: No spinous process tenderness.   Skin:     General: Skin is warm and dry.   Neurological:      Mental Status: He is oriented to person, place, and time.      Deep Tendon Reflexes:      Reflex Scores:       Bicep reflexes are 1+ on the right side and 1+ on the left side.       Patellar reflexes are 1+ on the right side and 1+ on the left side.  Psychiatric:         Attention and Perception: Attention and perception normal.         Mood and Affect: Mood and affect normal.         Speech: Speech normal.         Behavior: Behavior normal. Behavior is cooperative.         Thought Content: Thought content normal.         Cognition and Memory: Cognition and memory normal.         Judgment: Judgment normal.       Neurological Exam  Mental Status  Awake, alert and oriented to person, place and time. Oriented to person, place, and time. Memory is normal. Speech is normal.    Cranial Nerves  CN III, IV, VI: Extraocular movements intact bilaterally. Pupils equal round and reactive to light bilaterally.  CN V: Facial sensation is normal.  CN VII: Full and symmetric facial movement.  CN VIII: Hearing is normal.  CN XI: Shoulder shrug strength is normal.  CN XII: Tongue midline without atrophy or fasciculations.    Motor    Strength 5/5 throughout.    Sensory  Light touch is normal in upper and lower extremities.   Rapid finger and rapid hand movement intact bilaterally.    Reflexes                                            Right                      Left  Biceps                                 1+                         1+  Patellar                                1+                         1+    Right pathological  reflexes: Anastacia's absent. Ankle clonus absent.  Left pathological reflexes: Anastacia's absent. Ankle clonus absent.    Coordination  Right: Finger-to-nose normal.Left: Finger-to-nose normal.    Gait  Casual gait is normal including stance, stride, and arm swing.      Radiology Results Review: I personally reviewed the following image studies in PACS and associated radiology reports: xray(s). My interpretation of the radiology images/reports is:  .

## 2025-01-02 ENCOUNTER — HOSPITAL ENCOUNTER (OUTPATIENT)
Dept: ULTRASOUND IMAGING | Facility: HOSPITAL | Age: 70
End: 2025-01-02
Attending: FAMILY MEDICINE
Payer: MEDICARE

## 2025-01-02 DIAGNOSIS — N28.1 CYST OF RIGHT KIDNEY: ICD-10-CM

## 2025-01-02 PROCEDURE — 76775 US EXAM ABDO BACK WALL LIM: CPT

## 2025-01-08 ENCOUNTER — PATIENT MESSAGE (OUTPATIENT)
Dept: FAMILY MEDICINE CLINIC | Facility: CLINIC | Age: 70
End: 2025-01-08

## 2025-01-15 NOTE — PATIENT COMMUNICATION
Patient called for update on message sent. Advised message forward to provider awaiting response. Patient requests call back.

## 2025-01-21 NOTE — PATIENT COMMUNICATION
Pt called in to check on status of the Provider answering his message. Triage nurse checked pts chart for any updates, and nothing was updated. PCP please update pt on his previous message, when PCP is able.   Thank you

## 2025-01-23 DIAGNOSIS — I48.91 NEW ONSET A-FIB (HCC): ICD-10-CM

## 2025-01-24 RX ORDER — METOPROLOL TARTRATE 50 MG
50 TABLET ORAL EVERY 12 HOURS SCHEDULED
Qty: 60 TABLET | Refills: 2 | Status: SHIPPED | OUTPATIENT
Start: 2025-01-24

## 2025-01-24 RX ORDER — AMIODARONE HYDROCHLORIDE 200 MG/1
200 TABLET ORAL DAILY
Qty: 90 TABLET | Refills: 0 | Status: SHIPPED | OUTPATIENT
Start: 2025-01-24

## 2025-01-30 ENCOUNTER — RESULTS FOLLOW-UP (OUTPATIENT)
Dept: FAMILY MEDICINE CLINIC | Facility: CLINIC | Age: 70
End: 2025-01-30

## 2025-01-30 DIAGNOSIS — N28.1 RENAL CYST: Primary | ICD-10-CM

## 2025-02-05 DIAGNOSIS — R60.0 BILATERAL LOWER EXTREMITY EDEMA: ICD-10-CM

## 2025-02-05 RX ORDER — FUROSEMIDE 20 MG/1
20 TABLET ORAL DAILY
Qty: 90 TABLET | Refills: 1 | Status: SHIPPED | OUTPATIENT
Start: 2025-02-05

## 2025-02-10 ENCOUNTER — PATIENT MESSAGE (OUTPATIENT)
Dept: FAMILY MEDICINE CLINIC | Facility: CLINIC | Age: 70
End: 2025-02-10

## 2025-02-10 ENCOUNTER — HOSPITAL ENCOUNTER (OUTPATIENT)
Dept: NON INVASIVE DIAGNOSTICS | Facility: CLINIC | Age: 70
Discharge: HOME | End: 2025-02-10
Payer: MEDICARE

## 2025-02-10 VITALS
HEART RATE: 72 BPM | DIASTOLIC BLOOD PRESSURE: 90 MMHG | WEIGHT: 292 LBS | HEIGHT: 75 IN | BODY MASS INDEX: 36.31 KG/M2 | SYSTOLIC BLOOD PRESSURE: 140 MMHG

## 2025-02-10 DIAGNOSIS — I31.39 PERICARDIAL EFFUSION: ICD-10-CM

## 2025-02-10 DIAGNOSIS — I10 PRIMARY HYPERTENSION: ICD-10-CM

## 2025-02-10 DIAGNOSIS — I12.9 HYPERTENSIVE KIDNEY DISEASE WITH STAGE 3 CHRONIC KIDNEY DISEASE, UNSPECIFIED WHETHER STAGE 3A OR 3B CKD (HCC): Primary | ICD-10-CM

## 2025-02-10 DIAGNOSIS — N18.30 HYPERTENSIVE KIDNEY DISEASE WITH STAGE 3 CHRONIC KIDNEY DISEASE, UNSPECIFIED WHETHER STAGE 3A OR 3B CKD (HCC): Primary | ICD-10-CM

## 2025-02-10 LAB — BSA FOR ECHO PROCEDURE: 2.58 M2

## 2025-02-10 PROCEDURE — 93308 TTE F-UP OR LMTD: CPT | Performed by: STUDENT IN AN ORGANIZED HEALTH CARE EDUCATION/TRAINING PROGRAM

## 2025-02-10 PROCEDURE — 93308 TTE F-UP OR LMTD: CPT

## 2025-02-17 ENCOUNTER — OFFICE VISIT (OUTPATIENT)
Dept: CARDIOLOGY CLINIC | Facility: CLINIC | Age: 70
End: 2025-02-17
Payer: MEDICARE

## 2025-02-17 VITALS
WEIGHT: 297 LBS | HEART RATE: 68 BPM | SYSTOLIC BLOOD PRESSURE: 122 MMHG | BODY MASS INDEX: 36.93 KG/M2 | RESPIRATION RATE: 16 BRPM | HEIGHT: 75 IN | OXYGEN SATURATION: 98 % | DIASTOLIC BLOOD PRESSURE: 86 MMHG

## 2025-02-17 DIAGNOSIS — I50.32 CHRONIC DIASTOLIC CHF (CONGESTIVE HEART FAILURE) (HCC): ICD-10-CM

## 2025-02-17 DIAGNOSIS — I35.0 AORTIC VALVE STENOSIS, ETIOLOGY OF CARDIAC VALVE DISEASE UNSPECIFIED: ICD-10-CM

## 2025-02-17 DIAGNOSIS — I71.21 ANEURYSM OF ASCENDING AORTA WITHOUT RUPTURE (HCC): ICD-10-CM

## 2025-02-17 DIAGNOSIS — I48.0 PAF (PAROXYSMAL ATRIAL FIBRILLATION) (HCC): Primary | ICD-10-CM

## 2025-02-17 DIAGNOSIS — E78.00 PURE HYPERCHOLESTEROLEMIA: ICD-10-CM

## 2025-02-17 PROCEDURE — 99214 OFFICE O/P EST MOD 30 MIN: CPT | Performed by: PHYSICIAN ASSISTANT

## 2025-02-17 RX ORDER — LOSARTAN POTASSIUM 100 MG/1
100 TABLET ORAL DAILY
Qty: 90 TABLET | Refills: 1 | Status: SHIPPED | OUTPATIENT
Start: 2025-02-17

## 2025-02-17 NOTE — PROGRESS NOTES
[facility] [place]  [address]  [place] [address]  Cardiology Follow Up    Augustus Coffman  1955  [id number]    Assessment & Plan  PAF (paroxysmal atrial fibrillation) (HCC)  Rates in the 60s  Continue amiodarone, Eliquis, metoprolol  Check TSH, PFT  Discussed side effects of long-term amiodarone  Report any bleeding  Chronic diastolic CHF (congestive heart failure) (HCC)  Wt Readings from Last 3 Encounters:   02/17/25 135 kg (297 lb)   02/10/25 132 kg (292 lb)   12/26/24 132 kg (292 lb)   Appears euvolemic  Continue current dose of Lasix 20 daily  Continue daily weights, salt restriction  Advised to report 3 pound weight gain in 24 hours or 5 pound weight gain in 3 days    Aortic valve stenosis, etiology of cardiac valve disease unspecified  Moderate  Follows with CT surgery already for ascending aortic aneurysm  Plan for annual surveillance  Pure hypercholesterolemia  Continue Crestor  Aneurysm of ascending aorta without rupture (HCC)  Last measured 4.5 cm fusiform dilation of ascending aorta  Follows with CT surgery    Continue on medications.  Report any symptoms.  Plan for TSH, PFT.  Patient with recent LFTs and goes for regular eye visits.  Follow with PCP.  Report any bleeding.  Follow with PCP.  All questions answered.  Continue all medications. Previous studies reviewed with patient, medications reviewed and possible side effects discussed. Continue risk factor modification. Optimize weight, regular exercise and follow up with appropriate specialists and primary care physician as discussed.  All questions answered. Patient advised to report any problems prompting to medical attention. Return for follow up visit in  or 5 months earlier if needed        Interval History: Patient here today for routine follow-up visit.  Patient states overall feeling well.  Denies chest pain, chest pressure, chest heaviness.  Denies any shortness of breath, dizziness, palpitations.   Taking all medications as prescribed.  Had echocardiogram that needs to be reviewed.  Patient has history of paroxysmal atrial fibrillation, pericardial effusion, hyperlipidemia, aortic stenosis, ascending aortic aneurysm.  Patient had long hospitalization late summer last year with MSSA bacteremia.       Last echo February 10, 2025: Limited echo for assessment of pericardial effusion, TDS, pericardial effusion has decreased in size with only small effusion measuring 0.78 cm    Patient Active Problem List   Diagnosis    Diabetes 1.5, managed as type 2 (HCC)    History of hypertension    Hypercholesteremia    Hammer toe of right foot    Stasis dermatitis of both legs    Diabetic peripheral neuropathy (HCC)    Gout    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HCC)    Obesity with body mass index 30 or greater    Type 2 diabetes mellitus (HCC)    Retroperitoneal hematoma    Mesenteric lymphadenopathy    Acute blood loss anemia    FINA (acute kidney injury) on CKD stage 2(HCC)    GI bleed    Pleural effusion    Chronic venous hypertension (idiopathic) with ulcer of right lower extremity (HCC)    Rash    Stage 2 chronic kidney disease    Hypertensive kidney disease with stage 3 chronic kidney disease (HCC)    Other proteinuria    Obesity, morbid (HCC)    Follicular lymphoma grade I of lymph nodes of multiple sites (HCC)    Vitamin D deficiency    Stage 3a chronic kidney disease (HCC)    Type 2 diabetes mellitus with diabetic chronic kidney disease (HCC)    Ectatic thoracic aorta (HCC)    Aortic stenosis with trileaflet valve    Sepsis without acute organ dysfunction (HCC)    Acute respiratory failure with hypoxia (HCC)    Acute hyponatremia    Chronic diastolic CHF (congestive heart failure) (HCC)    Encounter for deep vein thrombosis (DVT) prophylaxis    Hyponatremia    Neck pain    Acute osteomyelitis of cervical spine (HCC)    Transaminitis    Acute renal failure with oliguria  (HCC)    MSSA bacteremia    New onset a-fib (HCC)     Acute pericarditis    Diabetic ulcer of right foot (HCC)    Constipation    Pressure injury of deep tissue of buttock    Other specified anemias    Aneurysm of ascending aorta without rupture (HCC)     Past Medical History:   Diagnosis Date    Aneurysm (HCC)     Arthritis 2010's    Occasionally flares up    Cancer (HCC) May 2021    Still investigating    Chronic kidney disease     Diabetes mellitus (HCC)     Follicular lymphoma (HCC)     GERD (gastroesophageal reflux disease) June 2021    Noticed in an Endoscopy    Heart murmur May 2020    High cholesterol     Hypertension     Kidney stone 1980's    Have had since 1980's    Obesity Since Childhood     Social History     Socioeconomic History    Marital status: /Civil Union     Spouse name: Not on file    Number of children: Not on file    Years of education: Not on file    Highest education level: Not on file   Occupational History    Not on file   Tobacco Use    Smoking status: Never    Smokeless tobacco: Never    Tobacco comments:     Never smoked but exposed to second hand smoke from birth until 1980's   Vaping Use    Vaping status: Never Used   Substance and Sexual Activity    Alcohol use: Never    Drug use: Never    Sexual activity: Not Currently     Partners: Female     Birth control/protection: Abstinence   Other Topics Concern    Not on file   Social History Narrative    Not on file     Social Drivers of Health     Financial Resource Strain: Not on file   Food Insecurity: No Food Insecurity (9/3/2024)    Nursing - Inadequate Food Risk Classification     Worried About Running Out of Food in the Last Year: Never true     Ran Out of Food in the Last Year: Never true     Ran Out of Food in the Last Year: Not on file   Transportation Needs: No Transportation Needs (9/3/2024)    PRAPARE - Transportation     Lack of Transportation (Medical): No     Lack of Transportation (Non-Medical): No   Physical Activity: Not on file   Stress: Not on file   Social  Connections: Unknown (6/18/2024)    Received from [employer]     How often do you feel lonely or isolated from those around you? (Adult - for ages 18 years and over): Not on file   Intimate Partner Violence: Not on file   Housing Stability: Low Risk  (9/3/2024)    Housing Stability Vital Sign     Unable to Pay for Housing in the Last Year: No     Number of Times Moved in the Last Year: 0     Homeless in the Last Year: No      Family History   Problem Relation Age of Onset    Cancer Father         Leukemia     Past Surgical History:   Procedure Laterality Date    BUNIONECTOMY Right 11/24/2020    Procedure: RIGHT HAV CORRECTION,;  Surgeon: Niranjan Child DPM;  [facility];  Service: Podiatry    COLONOSCOPY      INCISION AND DRAINAGE OF WOUND Right 10/05/2016    Procedure: INCISION AND DRAINAGE (I&D) EXTREMITY;  Surgeon: Niranjan Child DPM;  Location: [facility];  Service:     IR BIOPSY LYMPH NODE  07/08/2021    IR EMBOLIZATION (SPECIFY VESSEL OR SITE)  07/08/2021    IR PORT PLACEMENT  09/01/2022    IR TUNNELED CENTRAL LINE PLACEMENT  08/29/2024    IR TUNNELED CENTRAL LINE REMOVAL  10/09/2024    LIVER BIOPSY  7/8/2021    PILONIDAL CYST EXCISION      FL CORRECTION HAMMERTOE Right 02/19/2019    Procedure: THIRD HAMMER TOE CORRECTION;  Surgeon: Niranjan Child DPM;  [facility];  Service: Podiatry    FL RMVL MATEO CTR VAD W/SUBQ PORT/ CTR/PRPH INSJ N/A 03/14/2023    Procedure: REMOVAL VENOUS PORT (PORT-A-CATH)IR;  Surgeon: Avelino Vázquez DO;  [facility];  Service: Interventional Radiology    TOE OSTEOTOMY Right 03/14/2017    Procedure: HAMMERTOE CORRECTION R 2 ;  Surgeon: Niranjan Child DPM;  [facility];  Service:     TOE OSTEOTOMY Left 11/24/2020    Procedure: LEFT HT CORRECTION TOE;  Surgeon: Niranjan Child DPM;  [facility];  Service: Podiatry    TONSILLECTOMY  1963    UPPER GASTROINTESTINAL ENDOSCOPY  8/15/2022       Current Outpatient Medications:      amiodarone 200 mg tablet, TAKE 1 TABLET BY MOUTH EVERY DAY, Disp: 90 tablet, Rfl: 0    apixaban (ELIQUIS) 5 mg, Take 1 tablet (5 mg total) by mouth 2 (two) times a day, Disp: 60 tablet, Rfl: 5    Cholecalciferol 100 MCG (4000 UT) CAPS, Take 1.25 capsules (5,000 Units total) by mouth in the morning, Disp: , Rfl:     furosemide (LASIX) 20 mg tablet, TAKE 1 TABLET BY MOUTH EVERY DAY, Disp: 90 tablet, Rfl: 1    losartan (COZAAR) 50 mg tablet, Take 1 tablet (50 mg total) by mouth daily, Disp: 90 tablet, Rfl: 0    metFORMIN (GLUCOPHAGE) 500 mg tablet, TAKE 2 TABLETS BY MOUTH TWICE A DAY WITH FOOD, Disp: 360 tablet, Rfl: 1    methocarbamol (ROBAXIN) 750 mg tablet, Take 1 tablet (750 mg total) by mouth every 6 (six) hours as needed for muscle spasms for up to 14 days, Disp: 56 tablet, Rfl: 0    metoprolol tartrate (LOPRESSOR) 50 mg tablet, Take 1 tablet (50 mg total) by mouth every 12 (twelve) hours, Disp: 60 tablet, Rfl: 2    Multiple Vitamins-Minerals (Centrum Silver 50+Men) TABS, , Disp: , Rfl:     rosuvastatin (CRESTOR) 40 MG tablet, TAKE 1 TABLET BY MOUTH EVERY DAY IN THE EVENING, Disp: 90 tablet, Rfl: 1    Current Facility-Administered Medications:     lidocaine (LMX) 4 % cream, , Topical, Once, Susie Luu DPM  Allergies   Allergen Reactions    Lisinopril Cough         Imaging: Echo follow up/limited w/ contrast if indicated  Result Date: 2/10/2025  Narrative:   Limited echo for assessment of pericardial effusion, technically difficult exam due to poor acoustic windows   Pericardial effusion has decreased in size with only a small effusion measuring 0.78 cm anterior to the RV       Review of Systems:  Review of Systems   Constitutional: Negative.    HENT:  Positive for congestion.    Respiratory: Negative.     Cardiovascular: Negative.    Musculoskeletal:  Positive for neck pain.   Neurological: Negative.    Hematological: Negative.    Psychiatric/Behavioral: Negative.     All other systems reviewed and  "are negative.        /86 (BP Location: Left arm, Patient Position: Sitting, Cuff Size: Standard)   Pulse 68   Resp 16   Ht 6' 3\" (1.905 m)   Wt 135 kg (297 lb)   SpO2 98%   BMI 37.12 kg/m²     Physical Exam:  Physical Exam  Vitals and nursing note reviewed.   Constitutional:       Appearance: Normal appearance.   HENT:      Head: Normocephalic and atraumatic.   Cardiovascular:      Rate and Rhythm: Normal rate and regular rhythm.      Pulses: Normal pulses.      Heart sounds: Murmur heard.   Pulmonary:      Effort: Pulmonary effort is normal.      Breath sounds: Normal breath sounds.   Musculoskeletal:         General: Normal range of motion.      Cervical back: Normal range of motion and neck supple.   Skin:     General: Skin is warm and dry.   Neurological:      General: No focal deficit present.      Mental Status: He is alert and oriented to person, place, and time.   Psychiatric:         Mood and Affect: Mood normal.         Behavior: Behavior normal.         Thought Content: Thought content normal.         Judgment: Judgment normal.         "

## 2025-02-17 NOTE — PATIENT INSTRUCTIONS
Continue on medications.  Report any symptoms.  Plan for TSH, PFT.  Patient with recent LFTs and goes for regular eye visits.  Follow with PCP.  Report any bleeding.  Follow with PCP.  All questions answered.  Continue all medications. Previous studies reviewed with patient, medications reviewed and possible side effects discussed. Continue risk factor modification. Optimize weight, regular exercise and follow up with appropriate specialists and primary care physician as discussed.  All questions answered. Patient advised to report any problems prompting to medical attention. Return for follow up visit in  or 5 months earlier if needed

## 2025-02-17 NOTE — ASSESSMENT & PLAN NOTE
Wt Readings from Last 3 Encounters:   02/17/25 135 kg (297 lb)   02/10/25 132 kg (292 lb)   12/26/24 132 kg (292 lb)   Appears euvolemic  Continue current dose of Lasix 20 daily  Continue daily weights, salt restriction  Advised to report 3 pound weight gain in 24 hours or 5 pound weight gain in 3 days

## 2025-02-27 ENCOUNTER — TELEPHONE (OUTPATIENT)
Dept: NEPHROLOGY | Facility: CLINIC | Age: 70
End: 2025-02-27

## 2025-03-11 ENCOUNTER — TELEPHONE (OUTPATIENT)
Dept: NEPHROLOGY | Facility: CLINIC | Age: 70
End: 2025-03-11

## 2025-03-11 ENCOUNTER — APPOINTMENT (OUTPATIENT)
Dept: LAB | Facility: HOSPITAL | Age: 70
End: 2025-03-11
Payer: MEDICARE

## 2025-03-11 DIAGNOSIS — I48.0 PAF (PAROXYSMAL ATRIAL FIBRILLATION) (HCC): ICD-10-CM

## 2025-03-11 DIAGNOSIS — R80.8 OTHER PROTEINURIA: ICD-10-CM

## 2025-03-11 DIAGNOSIS — E55.9 VITAMIN D DEFICIENCY: ICD-10-CM

## 2025-03-11 DIAGNOSIS — I12.9 HYPERTENSIVE KIDNEY DISEASE WITH STAGE 3 CHRONIC KIDNEY DISEASE, UNSPECIFIED WHETHER STAGE 3A OR 3B CKD (HCC): ICD-10-CM

## 2025-03-11 DIAGNOSIS — N18.31 STAGE 3A CHRONIC KIDNEY DISEASE (HCC): Primary | ICD-10-CM

## 2025-03-11 DIAGNOSIS — N18.30 HYPERTENSIVE KIDNEY DISEASE WITH STAGE 3 CHRONIC KIDNEY DISEASE, UNSPECIFIED WHETHER STAGE 3A OR 3B CKD (HCC): ICD-10-CM

## 2025-03-11 LAB
25(OH)D3 SERPL-MCNC: 42.3 NG/ML (ref 30–100)
ANION GAP SERPL CALCULATED.3IONS-SCNC: 9 MMOL/L (ref 4–13)
BACTERIA UR QL AUTO: ABNORMAL /HPF
BASOPHILS # BLD AUTO: 0.12 THOUSANDS/ÂΜL (ref 0–0.1)
BASOPHILS NFR BLD AUTO: 1 % (ref 0–1)
BILIRUB UR QL STRIP: NEGATIVE
BUN SERPL-MCNC: 21 MG/DL (ref 5–25)
CALCIUM SERPL-MCNC: 10.2 MG/DL (ref 8.4–10.2)
CHLORIDE SERPL-SCNC: 99 MMOL/L (ref 96–108)
CLARITY UR: CLEAR
CO2 SERPL-SCNC: 32 MMOL/L (ref 21–32)
COLOR UR: COLORLESS
CREAT SERPL-MCNC: 1.15 MG/DL (ref 0.6–1.3)
CREAT UR-MCNC: 38.8 MG/DL
EOSINOPHIL # BLD AUTO: 0.33 THOUSAND/ÂΜL (ref 0–0.61)
EOSINOPHIL NFR BLD AUTO: 4 % (ref 0–6)
ERYTHROCYTE [DISTWIDTH] IN BLOOD BY AUTOMATED COUNT: 17.1 % (ref 11.6–15.1)
GFR SERPL CREATININE-BSD FRML MDRD: 65 ML/MIN/1.73SQ M
GLUCOSE P FAST SERPL-MCNC: 123 MG/DL (ref 65–99)
GLUCOSE UR STRIP-MCNC: NEGATIVE MG/DL
HCT VFR BLD AUTO: 44.6 % (ref 36.5–49.3)
HGB BLD-MCNC: 14 G/DL (ref 12–17)
HGB UR QL STRIP.AUTO: NEGATIVE
IMM GRANULOCYTES # BLD AUTO: 0.04 THOUSAND/UL (ref 0–0.2)
IMM GRANULOCYTES NFR BLD AUTO: 1 % (ref 0–2)
KETONES UR STRIP-MCNC: NEGATIVE MG/DL
LEUKOCYTE ESTERASE UR QL STRIP: NEGATIVE
LYMPHOCYTES # BLD AUTO: 1.04 THOUSANDS/ÂΜL (ref 0.6–4.47)
LYMPHOCYTES NFR BLD AUTO: 12 % (ref 14–44)
MCH RBC QN AUTO: 27.5 PG (ref 26.8–34.3)
MCHC RBC AUTO-ENTMCNC: 31.4 G/DL (ref 31.4–37.4)
MCV RBC AUTO: 88 FL (ref 82–98)
MICROALBUMIN UR-MCNC: 680.3 MG/L
MICROALBUMIN/CREAT 24H UR: 1753 MG/G CREATININE (ref 0–30)
MONOCYTES # BLD AUTO: 0.59 THOUSAND/ÂΜL (ref 0.17–1.22)
MONOCYTES NFR BLD AUTO: 7 % (ref 4–12)
NEUTROPHILS # BLD AUTO: 6.36 THOUSANDS/ÂΜL (ref 1.85–7.62)
NEUTS SEG NFR BLD AUTO: 75 % (ref 43–75)
NITRITE UR QL STRIP: NEGATIVE
NON-SQ EPI CELLS URNS QL MICRO: ABNORMAL /HPF
NRBC BLD AUTO-RTO: 0 /100 WBCS
PH UR STRIP.AUTO: 7 [PH]
PHOSPHATE SERPL-MCNC: 3.2 MG/DL (ref 2.3–4.1)
PLATELET # BLD AUTO: 166 THOUSANDS/UL (ref 149–390)
PMV BLD AUTO: 10.4 FL (ref 8.9–12.7)
POTASSIUM SERPL-SCNC: 4.4 MMOL/L (ref 3.5–5.3)
PROT UR STRIP-MCNC: ABNORMAL MG/DL
PTH-INTACT SERPL-MCNC: 66.2 PG/ML (ref 12–88)
RBC # BLD AUTO: 5.1 MILLION/UL (ref 3.88–5.62)
RBC #/AREA URNS AUTO: ABNORMAL /HPF
SODIUM SERPL-SCNC: 140 MMOL/L (ref 135–147)
SP GR UR STRIP.AUTO: 1.01 (ref 1–1.03)
TSH SERPL DL<=0.05 MIU/L-ACNC: 52.85 UIU/ML (ref 0.45–4.5)
URATE SERPL-MCNC: 8.4 MG/DL (ref 3.5–8.5)
UROBILINOGEN UR STRIP-ACNC: <2 MG/DL
WBC # BLD AUTO: 8.48 THOUSAND/UL (ref 4.31–10.16)
WBC #/AREA URNS AUTO: ABNORMAL /HPF

## 2025-03-11 PROCEDURE — 82570 ASSAY OF URINE CREATININE: CPT

## 2025-03-11 PROCEDURE — 84550 ASSAY OF BLOOD/URIC ACID: CPT

## 2025-03-11 PROCEDURE — 84100 ASSAY OF PHOSPHORUS: CPT

## 2025-03-11 PROCEDURE — 85025 COMPLETE CBC W/AUTO DIFF WBC: CPT

## 2025-03-11 PROCEDURE — 81001 URINALYSIS AUTO W/SCOPE: CPT

## 2025-03-11 PROCEDURE — 82306 VITAMIN D 25 HYDROXY: CPT

## 2025-03-11 PROCEDURE — 82043 UR ALBUMIN QUANTITATIVE: CPT

## 2025-03-11 PROCEDURE — 36415 COLL VENOUS BLD VENIPUNCTURE: CPT

## 2025-03-11 PROCEDURE — 84439 ASSAY OF FREE THYROXINE: CPT

## 2025-03-11 PROCEDURE — 84443 ASSAY THYROID STIM HORMONE: CPT

## 2025-03-11 PROCEDURE — 80048 BASIC METABOLIC PNL TOTAL CA: CPT

## 2025-03-11 PROCEDURE — 83970 ASSAY OF PARATHORMONE: CPT

## 2025-03-11 NOTE — TELEPHONE ENCOUNTER
I called and left message on patients answering machine reminding patient to have non fasting lab work done prior to appointment for Thursday 03/13/2025 with Dr. Heath.

## 2025-03-12 LAB — T4 FREE SERPL-MCNC: <0.25 NG/DL (ref 0.61–1.12)

## 2025-03-13 ENCOUNTER — OFFICE VISIT (OUTPATIENT)
Dept: NEPHROLOGY | Facility: CLINIC | Age: 70
End: 2025-03-13
Payer: MEDICARE

## 2025-03-13 VITALS
RESPIRATION RATE: 16 BRPM | BODY MASS INDEX: 37.92 KG/M2 | DIASTOLIC BLOOD PRESSURE: 70 MMHG | SYSTOLIC BLOOD PRESSURE: 130 MMHG | HEART RATE: 55 BPM | OXYGEN SATURATION: 99 % | WEIGHT: 305 LBS | TEMPERATURE: 97.2 F | HEIGHT: 75 IN

## 2025-03-13 DIAGNOSIS — E55.9 VITAMIN D DEFICIENCY: ICD-10-CM

## 2025-03-13 DIAGNOSIS — N18.2 STAGE 2 CHRONIC KIDNEY DISEASE: ICD-10-CM

## 2025-03-13 DIAGNOSIS — I12.9 HYPERTENSIVE KIDNEY DISEASE WITH STAGE 2 CHRONIC KIDNEY DISEASE: Primary | ICD-10-CM

## 2025-03-13 DIAGNOSIS — R80.8 OTHER PROTEINURIA: ICD-10-CM

## 2025-03-13 DIAGNOSIS — N18.2 HYPERTENSIVE KIDNEY DISEASE WITH STAGE 2 CHRONIC KIDNEY DISEASE: Primary | ICD-10-CM

## 2025-03-13 PROBLEM — N18.31 STAGE 3A CHRONIC KIDNEY DISEASE (HCC): Status: RESOLVED | Noted: 2023-03-08 | Resolved: 2025-03-13

## 2025-03-13 PROCEDURE — G2211 COMPLEX E/M VISIT ADD ON: HCPCS | Performed by: INTERNAL MEDICINE

## 2025-03-13 PROCEDURE — 99214 OFFICE O/P EST MOD 30 MIN: CPT | Performed by: INTERNAL MEDICINE

## 2025-03-13 NOTE — ASSESSMENT & PLAN NOTE
Multifactorial and suspected due to underlying diabetic nephropathy on top of hypertensive nephrosclerosis.  SPEP and UPEP done on 1/31/2023 showed no M spike.     Previously patient was taking Jardiance and losartan which was discontinued during previous hospital stay due to FINA.  Losartan was restarted in the interim by PCP and currently patient is taking losartan 100 mg p.o. daily.  Current urine microalbumin to creatinine ratio is 1.75 g which is above the goal.  Plan to start patient on Jardiance [generic] 25 mg p.o. daily to help manage proteinuria..  Patient's last known HbA1c is 5.5% [10/8/2024] and discussed with patient that if develops hypoglycemia with the use of Farxiga, would recommend decreasing the dose of metformin.

## 2025-03-13 NOTE — ASSESSMENT & PLAN NOTE
Today's blood pressure was under well control and plan to monitor hypertension with metoprolol 50 mg p.o. twice daily along with losartan 100 mg p.o. daily.  Encourage patient to follow low-salt diet.

## 2025-03-13 NOTE — PROGRESS NOTES
NEPHROLOGY OFFICE FOLLOW UP  Augustus Coffman 69 y.o.male YOB: 1955 MRN: [id number]    Encounter: [id number] DATE: 3/13/2025    REASON FOR VISIT: Augustus Coffman is a 69 y.o. male who is here on 3/13/2025 for Chronic Kidney Disease and Follow-up      ASSESSMENT and PLAN:  Wilmer was seen today for chronic kidney disease and follow-up.    Diagnoses and all orders for this visit:    Hypertensive kidney disease with stage 2 chronic kidney disease  -     CBC and differential; Future  -     Basic metabolic panel; Future  -     Urinalysis with microscopic; Future  -     Albumin / creatinine urine ratio; Future    Stage 2 chronic kidney disease  -     CBC and differential; Future  -     Basic metabolic panel; Future  -     Urinalysis with microscopic; Future  -     Albumin / creatinine urine ratio; Future    Other proteinuria  -     Basic metabolic panel; Future  -     Urinalysis with microscopic; Future  -     Albumin / creatinine urine ratio; Future  -     Empagliflozin 25 MG TABS; Take 1 tablet (25 mg total) by mouth every morning    Vitamin D deficiency  -     Vitamin D 25 hydroxy; Future      Assessment & Plan  Stage 2 chronic kidney disease    Multifactorial, suspected due to underlying diabetic nephropathy on top of hypertensive nephrosclerosis with proteinuria.     Upon review of old medical records from [facility] chart, new baseline creatinine is thought to be around 1.0-1.2 and in the interim renal function has remained at baseline with current creatinine of 1.15 with EGFR of 65.    Patient has underlying leg swelling and now taking Lasix 20 mg p.o. daily.     Patient has underlying diabetes type 2 and goal HbA1c for underlying chronic kidney disease would be less than 7%.  Currently patient is taking metformin and defer further management of diabetes to PCP.  Plan to add Jardiance 25 mg p.o. daily as mentioned below for management of proteinuria.     Management of hypertension as mentioned below.      Plan to avoid NSAIDs and recheck renal function before next visit.  Hypertensive kidney disease with stage 2 chronic kidney disease    Today's blood pressure was under well control and plan to monitor hypertension with metoprolol 50 mg p.o. twice daily along with losartan 100 mg p.o. daily.  Encourage patient to follow low-salt diet.  Other proteinuria    Multifactorial and suspected due to underlying diabetic nephropathy on top of hypertensive nephrosclerosis.  SPEP and UPEP done on 1/31/2023 showed no M spike.     Previously patient was taking Jardiance and losartan which was discontinued during previous hospital stay due to [place].  Losartan was restarted in the interim by PCP and currently patient is taking losartan 100 mg p.o. daily.  Current urine microalbumin to creatinine ratio is 1.75 g which is above the goal.  Plan to start patient on Jardiance [generic] 25 mg p.o. daily to help manage proteinuria..  Patient's last known HbA1c is 5.5% [10/8/2024] and discussed with patient that if develops hypoglycemia with the use of Farxiga, would recommend decreasing the dose of metformin.  Vitamin D deficiency    Patient is currently taking cholecalciferol 5000 units p.o. daily.  Current vitamin D level is 42 which is at goal and plan to continue cholecalciferol at current dose and recheck vitamin D level with the next visit.     Returns to the nephrology office in 6 months.  Plan to check CBC, BMP, vitamin D, urine analysis along with urine microalbumin to creatinine ratio before next visit.        HPI:    This is 69-year-old male with past medical history significant for hypertension, diabetes type 2, hyperlipidemia, returns to the Nephrology office for further management of CKD stage 2-3.     Upon review of old medical records, new baseline creatinine is thought to be around 1.0-1.2.     Patient has underlying proteinuria and currently taking losartan.     Patient was earlier found to have mesenteric and  retroperitoneal lymphadenopathy.  Patient had underwent lymph node biopsy in the past and was diagnosed with follicular lymphoma.  Patient has received chemotherapy in the past and according to patient lymphoma is currently in remission.    Earlier patient was found to have lower extremity edema and was started on Lasix by PCP.     Patient has underlying hypertension and has been compliant with his antihypertensive medications.    Patient has underlying paroxysmal A-fib and currently been followed by cardiology team at [facility] and also take amiodarone along with Eliquis.     Patient has diabetes type 2 and currently taking metformin.     Patient also have underlying vitamin D deficiency and takes cholecalciferol.     Patient takes all the medications as prescribed and denies use of NSAIDs .          REVIEW OF SYSTEMS:    Review of Systems   Constitutional:  Negative for chills and fever.   HENT:  Negative for nosebleeds.    Eyes:  Negative for photophobia and pain.   Respiratory:  Negative for choking.    Cardiovascular:  Negative for palpitations.   Gastrointestinal:  Negative for blood in stool.   Genitourinary:  Negative for hematuria.   Musculoskeletal:  Negative for neck stiffness.   Neurological:  Negative for seizures.   Psychiatric/Behavioral:  Negative for confusion and suicidal ideas.          PAST MEDICAL HISTORY:  Past Medical History:   Diagnosis Date    Aneurysm (HCC)     Arthritis 2010's    Occasionally flares up    Cancer (HCC) May 2021    Still investigating    Chronic kidney disease     Diabetes mellitus (HCC)     Follicular lymphoma (HCC)     GERD (gastroesophageal reflux disease) June 2021    Noticed in an Endoscopy    Heart murmur May 2020    High cholesterol     Hypertension     Kidney stone 1980's    Have had since 1980's    Obesity Since Childhood       PAST SURGICAL HISTORY:  Past Surgical History:   Procedure Laterality Date    BUNIONECTOMY Right 11/24/2020    Procedure: RIGHT HAV  CORRECTION,;  Surgeon: Niranjan Child DPM;  Location: [facility];  Service: Podiatry    COLONOSCOPY      INCISION AND DRAINAGE OF WOUND Right 10/05/2016    Procedure: INCISION AND DRAINAGE (I&D) EXTREMITY;  Surgeon: Niranjan Child DPM;  Location: [facility];  Service:     IR BIOPSY LYMPH NODE  07/08/2021    IR EMBOLIZATION (SPECIFY VESSEL OR SITE)  07/08/2021    IR PORT PLACEMENT  09/01/2022    IR TUNNELED CENTRAL LINE PLACEMENT  08/29/2024    IR TUNNELED CENTRAL LINE REMOVAL  10/09/2024    LIVER BIOPSY  7/8/2021    PILONIDAL CYST EXCISION      MD CORRECTION HAMMERTOE Right 02/19/2019    Procedure: THIRD HAMMER TOE CORRECTION;  Surgeon: Niranjan Child DPM;  Location[facility];  Service: Podiatry    MD RMVL MATEO CTR VAD W/SUBQ PORT/ CTR/PRPH INSJ N/A 03/14/2023    Procedure: REMOVAL VENOUS PORT (PORT-A-CATH)IR;  Surgeon: Avelino Vázquez DO;  [facility];  Service: Interventional Radiology    TOE OSTEOTOMY Right 03/14/2017    Procedure: HAMMERTOE CORRECTION R 2 ;  Surgeon: Niranjan Child DPM;  [facility];  Service:     TOE OSTEOTOMY Left 11/24/2020    Procedure: LEFT HT CORRECTION TOE;  Surgeon: Niranjan Child DPM;  [facility];  Service: Podiatry    TONSILLECTOMY  1963    UPPER GASTROINTESTINAL ENDOSCOPY  8/15/2022       SOCIAL HISTORY:  Social History     Substance and Sexual Activity   Alcohol Use Never     Social History     Substance and Sexual Activity   Drug Use Never     Social History     Tobacco Use   Smoking Status Never   Smokeless Tobacco Never   Tobacco Comments    Never smoked but exposed to second hand smoke from birth until 1980's       FAMILY HISTORY:  Family History   Problem Relation Age of Onset    Cancer Father         Leukemia       ALLERGY:  Allergies   Allergen Reactions    Lisinopril Cough       MEDICATIONS:    Current Outpatient Medications:     amiodarone 200 mg tablet, TAKE 1 TABLET BY MOUTH EVERY DAY, Disp: 90 tablet, Rfl: 0    apixaban (ELIQUIS) 5 mg, Take 1  "tablet (5 mg total) by mouth 2 (two) times a day, Disp: 60 tablet, Rfl: 5    Cholecalciferol 100 MCG (4000 UT) CAPS, Take 1.25 capsules (5,000 Units total) by mouth in the morning, Disp: , Rfl:     Empagliflozin 25 MG TABS, Take 1 tablet (25 mg total) by mouth every morning, Disp: 90 tablet, Rfl: 2    furosemide (LASIX) 20 mg tablet, TAKE 1 TABLET BY MOUTH EVERY DAY, Disp: 90 tablet, Rfl: 1    losartan (COZAAR) 100 MG tablet, Take 1 tablet (100 mg total) by mouth daily, Disp: 90 tablet, Rfl: 1    metFORMIN (GLUCOPHAGE) 500 mg tablet, TAKE 2 TABLETS BY MOUTH TWICE A DAY WITH FOOD, Disp: 360 tablet, Rfl: 1    methocarbamol (ROBAXIN) 750 mg tablet, Take 1 tablet (750 mg total) by mouth every 6 (six) hours as needed for muscle spasms for up to 14 days, Disp: 56 tablet, Rfl: 0    metoprolol tartrate (LOPRESSOR) 50 mg tablet, Take 1 tablet (50 mg total) by mouth every 12 (twelve) hours, Disp: 60 tablet, Rfl: 2    Multiple Vitamins-Minerals (Centrum Silver 50+Men) TABS, , Disp: , Rfl:     rosuvastatin (CRESTOR) 40 MG tablet, TAKE 1 TABLET BY MOUTH EVERY DAY IN THE EVENING, Disp: 90 tablet, Rfl: 1    Current Facility-Administered Medications:     lidocaine (LMX) 4 % cream, , Topical, Once, Susie Luu DPM    PHYSICAL EXAM:  Vitals:    03/13/25 1521   BP: 130/70   Patient Position: Sitting   Cuff Size: Large   Pulse: 55   Resp: 16   Temp: (!) 97.2 °F (36.2 °C)   TempSrc: Temporal   SpO2: 99%   Weight: (!) 138 kg (305 lb)   Height: 6' 3\" (1.905 m)     Body mass index is 38.12 kg/m².    Physical Exam  Constitutional:       General: He is not in acute distress.  HENT:      Right Ear: External ear normal.   Eyes:      Conjunctiva/sclera:      Right eye: No hemorrhage.  Neck:      Thyroid: No thyromegaly.   Pulmonary:      Effort: No accessory muscle usage or respiratory distress.   Abdominal:      General: There is no distension.   Skin:     Coloration: Skin is not jaundiced.   Psychiatric:         Behavior: " Behavior is not combative.         LAB RESULTS:  Results for orders placed or performed in visit on 03/11/25   CBC and differential   Result Value Ref Range    WBC 8.48 4.31 - 10.16 Thousand/uL    RBC 5.10 3.88 - 5.62 Million/uL    Hemoglobin 14.0 12.0 - 17.0 g/dL    Hematocrit 44.6 36.5 - 49.3 %    MCV 88 82 - 98 fL    MCH 27.5 26.8 - 34.3 pg    MCHC 31.4 31.4 - 37.4 g/dL    RDW 17.1 (H) 11.6 - 15.1 %    MPV 10.4 8.9 - 12.7 fL    Platelets 166 149 - 390 Thousands/uL    nRBC 0 /100 WBCs    Segmented % 75 43 - 75 %    Immature Grans % 1 0 - 2 %    Lymphocytes % 12 (L) 14 - 44 %    Monocytes % 7 4 - 12 %    Eosinophils Relative 4 0 - 6 %    Basophils Relative 1 0 - 1 %    Absolute Neutrophils 6.36 1.85 - 7.62 Thousands/µL    Absolute Immature Grans 0.04 0.00 - 0.20 Thousand/uL    Absolute Lymphocytes 1.04 0.60 - 4.47 Thousands/µL    Absolute Monocytes 0.59 0.17 - 1.22 Thousand/µL    Eosinophils Absolute 0.33 0.00 - 0.61 Thousand/µL    Basophils Absolute 0.12 (H) 0.00 - 0.10 Thousands/µL   Basic metabolic panel   Result Value Ref Range    Sodium 140 135 - 147 mmol/L    Potassium 4.4 3.5 - 5.3 mmol/L    Chloride 99 96 - 108 mmol/L    CO2 32 21 - 32 mmol/L    ANION GAP 9 4 - 13 mmol/L    BUN 21 5 - 25 mg/dL    Creatinine 1.15 0.60 - 1.30 mg/dL    Glucose, Fasting 123 (H) 65 - 99 mg/dL    Calcium 10.2 8.4 - 10.2 mg/dL    eGFR 65 ml/min/1.73sq m   Urinalysis with microscopic   Result Value Ref Range    Color, UA Colorless     Clarity, UA Clear     Specific Gravity, UA 1.010 1.003 - 1.030    pH, UA 7.0 4.5, 5.0, 5.5, 6.0, 6.5, 7.0, 7.5, 8.0    Leukocytes, UA Negative Negative    Nitrite, UA Negative Negative    Protein, UA 70 (1+) (A) Negative mg/dl    Glucose, UA Negative Negative mg/dl    Ketones, UA Negative Negative mg/dl    Urobilinogen, UA <2.0 <2.0 mg/dl mg/dl    Bilirubin, UA Negative Negative    Occult Blood, UA Negative Negative    RBC, UA 1-2 None Seen, 1-2 /hpf    WBC, UA None Seen None Seen, 1-2 /hpf     "Epithelial Cells Occasional None Seen, Occasional /hpf    Bacteria, UA None Seen None Seen, Occasional /hpf   Albumin / creatinine urine ratio   Result Value Ref Range    Creatinine, Ur 38.8 Reference range not established. mg/dL    Albumin,U,Random 680.3 (H) <20.0 mg/L    Albumin Creat Ratio 1,753 (H) 0 - 30 mg/g creatinine   Phosphorus   Result Value Ref Range    Phosphorus 3.2 2.3 - 4.1 mg/dL   Uric acid   Result Value Ref Range    Uric Acid 8.4 3.5 - 8.5 mg/dL   PTH, intact   Result Value Ref Range    PTH 66.2 12.0 - 88.0 pg/mL   Vitamin D 25 hydroxy   Result Value Ref Range    Vit D, 25-Hydroxy 42.3 30.0 - 100.0 ng/mL   TSH, 3rd generation with Free T4 reflex   Result Value Ref Range    TSH 3RD GENERATON 52.846 (H) 0.450 - 4.500 uIU/mL   T4, free   Result Value Ref Range    Free T4 <0.25 (L) 0.61 - 1.12 ng/dL         Portions of the record may have been created with voice recognition software. Occasional wrong word or \"sound a like\" substitutions may have occurred due to the inherent limitations of voice recognition software. Read the chart carefully and recognize, using context, where substitutions have occurred. If you have any questions, please contact the dictating provider.   "

## 2025-03-13 NOTE — ASSESSMENT & PLAN NOTE
Multifactorial, suspected due to underlying diabetic nephropathy on top of hypertensive nephrosclerosis with proteinuria.     Upon review of old medical records from [facility] chart, new baseline creatinine is thought to be around 1.0-1.2 and in the interim renal function has remained at baseline with current creatinine of 1.15 with EGFR of 65.    Patient has underlying leg swelling and now taking Lasix 20 mg p.o. daily.     Patient has underlying diabetes type 2 and goal HbA1c for underlying chronic kidney disease would be less than 7%.  Currently patient is taking metformin and defer further management of diabetes to PCP.  Plan to add Jardiance 25 mg p.o. daily as mentioned below for management of proteinuria.     Management of hypertension as mentioned below.     Plan to avoid NSAIDs and recheck renal function before next visit.

## 2025-03-13 NOTE — ASSESSMENT & PLAN NOTE
Patient is currently taking cholecalciferol 5000 units p.o. daily.  Current vitamin D level is 42 which is at goal and plan to continue cholecalciferol at current dose and recheck vitamin D level with the next visit.

## 2025-03-28 ENCOUNTER — HOSPITAL ENCOUNTER (OUTPATIENT)
Dept: RADIOLOGY | Facility: HOSPITAL | Age: 70
End: 2025-03-28
Payer: MEDICARE

## 2025-03-28 DIAGNOSIS — M46.22 ACUTE OSTEOMYELITIS OF CERVICAL SPINE (HCC): ICD-10-CM

## 2025-03-28 PROCEDURE — 72052 X-RAY EXAM NECK SPINE 6/>VWS: CPT

## 2025-04-03 ENCOUNTER — OFFICE VISIT (OUTPATIENT)
Dept: NEUROSURGERY | Facility: CLINIC | Age: 70
End: 2025-04-03
Payer: MEDICARE

## 2025-04-03 VITALS
HEART RATE: 50 BPM | HEIGHT: 75 IN | OXYGEN SATURATION: 97 % | WEIGHT: 305 LBS | TEMPERATURE: 97.6 F | BODY MASS INDEX: 37.92 KG/M2 | RESPIRATION RATE: 18 BRPM | SYSTOLIC BLOOD PRESSURE: 122 MMHG | DIASTOLIC BLOOD PRESSURE: 84 MMHG

## 2025-04-03 DIAGNOSIS — M46.22 ACUTE OSTEOMYELITIS OF CERVICAL SPINE (HCC): Primary | ICD-10-CM

## 2025-04-03 PROCEDURE — 99214 OFFICE O/P EST MOD 30 MIN: CPT | Performed by: NURSE PRACTITIONER

## 2025-04-03 NOTE — PROGRESS NOTES
Name: Augustus Coffman      : 1955      MRN: [id number]  Encounter Provider: Mario Valadez MD  Encounter Date: 4/3/2025   Encounter department: [facility] [place]  :  Assessment & Plan  Acute osteomyelitis of cervical spine (HCC)  As addressed in HPI.  Cervical osteomyelitis/discitis C5-C6 as a complication of MSSA bacteremia.  As per ID note on 2024 most recent MRI of C-spine on 10/31/2024 with substantial improvement and resolution of phlegmon.  Inflammatory markers have appropriately trended down with treatment and have now normalized.  Discontinue antibiotic as inflammatory markers have normalized.  Presents for 3-month follow-up visit after last visit on 2024 with completed imaging and reassessment of cervical kyphosis at the level of osteomyelitis to determine if surgical intervention is indicated.  Visit is scheduled with [facility] and spine surgeon Dr. SANTIZO.He demonstrates significant cervical kyphosis.  Patient verbalized he is not interested in cervical spine surgery.  Reports he has started physical therapy and is diligently performing home exercises.  He is hoping that home exercises with strengthening his cervical core and paraspinal muscular turgor to improve his cervical posture and that no surgical intervention will be indicated.  He denies pain, rates it 0/10.  Reports limitation in cervical range of motion is unable to look up.  Patient is wearing a walking shoe on his right foot as he continues to have a diabetic ulcer that he reports is improving    Imaging  3/28/2025 x-ray cervical spine complete 6+ with flexion and extension-There is stable sequela of discitis osteomyelitis at C5-C6 with loss of vertebral body height and focal kyphosis at this level. There is disc space narrowing at this level. No new endplate erosive changes identified. There is no significant spondylolisthesis .  Intervertebral disc heights are maintained.No new endplate erosive  changes identified.     Imaging reviewed in collaboration with Dr. SANTIZO, swan-neck deformity, no plan for surgical intervention at this time.  Patient should continue conservative management with physical therapy and HEP.    Plan  Reviewed x-ray imaging with patient and wife  Reinforced the need for diligent compliance with home exercises provided by physical therapy, integrate exercises into his lifestyle for cervical paraspinal spinal and core muscle strengthening and exercises to improve cervical posture.  Patient verbalized again he is not interested in surgical intervention.  Reinforced with patient ongoing monitoring of symptoms he experienced when initially diagnosed with discitis osteomyelitis if recur he should report to the closest emergency department for immediate assessment.  Reviewed red flag signs of cervical myelopathic advised if he experiences he should go to the closest emergency room for assessment.  Advised if he has deterioration and progression of cervical kyphosis with associated pain and discomfort he should notify neurosurgery for a follow-up appointment.  Advised patient if he has additional questions or concerns he should contact the neurosurgery department.  Advised patient no further imaging indicated and return to office as needed.               History of Present Illness     Augustus Coffman is a 69 y.o. male who presents he presents as a follow-up appointment 3 months status post his last office visit on 12/26/2024 for acute osteomyelitis of the cervical spine with sepsis, MSSA bacteremia.    HPI   Last office visit note 12/26/2024 with [facility] and spine surgeon indicated schedule a joint appointment with Dr. SANTIZO and FERNANDO for a follow-up in 3 months with repeat cervical flexion/extension x-rays to assess for any progression of acute C5-6 osteomyelitis/discitis.presented to the hospital on 7/23/2024 with complaints of neck pain with healing right foot ulcer. He was diagnosed with MSSA  bacteremia and placed on antibiotic therapy for 7 days. He had ongoing neck pain that bothered him and ultimately he saw orthopedic doctor as an outpatient who ordered an MRI, this was completed on 8/23/2024 which indicated C5-6 acute osteomyelitis/discitis with prevertebral abscess, he was advised to go to the ER immediately. Ultimately he was transferred to [facility] for further management. He was started on IV cefazolin and placed in a cervical collar.     At last office visit imaging demonstrated significant cervical kyphosis at level of osteomyelitis/discitis.  As per provider note patient was not experiencing any concerning symptoms however it was discussed she may require surgical deformity correction at a later date which would only be considered once he has completed antibiotic therapy and infection has completely resolved.  As per documentation patient was not keen on proceeding with surgery and continues with the same stance today.Patient completed 6-week course of IV cefazolin on 10/2/2024 then transition to oral cefadroxil and recently saw infectious disease on 12/19/2024 who stopped antibiotics as inflammatory markers normalized.  Patient started weaning out of cervical collar during visit 12/4/2025 and was completely weaned down by visit on 12/26/2025.  At last office visit imaging reviewed with [facility] and a surgeon was grossly stable.,  But demonstrated significant cervical kyphosis at the level of osteomyelitis/discitis.  Patient presented with no concerning symptoms.  He was scheduled for a follow-up return visit in 3 months with repeat cervical flexion and extension imaging to assess for progression.  He presents today as a 3-month follow-up visit.            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  He has an adult daughter that helps manage the restaurant.    Comorbid conditions  Diabetes  mellitus, diabetic peripheral neuropathy, right foot diabetic foot wound he reports a foot wound is the cause of his osteomyelitis.  Atrial fibrillation-  Aneurysm ascending aorta    Review of Systems   Constitutional: Negative.    HENT: Negative.     Eyes: Negative.    Respiratory: Negative.     Cardiovascular: Negative.    Gastrointestinal: Negative.    Endocrine: Negative.    Genitourinary: Negative.    Musculoskeletal:  Positive for neck stiffness (unable to look up). Negative for gait problem, myalgias and neck pain.   Skin: Negative.    Allergic/Immunologic: Negative.    Neurological:  Negative for weakness, numbness and headaches.   Hematological: Negative.    Psychiatric/Behavioral: Negative.       I have personally reviewed the MA's review of systems and made changes as necessary.    Past Medical History   Past Medical History:   Diagnosis Date    Aneurysm (HCC)     Arthritis 2010's    Occasionally flares up    Cancer (HCC) May 2021    Still investigating    Chronic kidney disease     Diabetes mellitus (HCC)     Follicular lymphoma (HCC)     GERD (gastroesophageal reflux disease) June 2021    Noticed in an Endoscopy    Heart murmur May 2020    High cholesterol     Hypertension     Kidney stone 1980's    Have had since 1980's    Obesity Since Childhood     Past Surgical History:   Procedure Laterality Date    BUNIONECTOMY Right 11/24/2020    Procedure: RIGHT HAV CORRECTION,;  Surgeon: Niranjan Child DPM;  Location: [facility];  Service: Podiatry    COLONOSCOPY      INCISION AND DRAINAGE OF WOUND Right 10/05/2016    Procedure: INCISION AND DRAINAGE (I&D) EXTREMITY;  Surgeon: Niranjan Child DPM;  Location: [facility];  Service:     IR BIOPSY LYMPH NODE  07/08/2021    IR EMBOLIZATION (SPECIFY VESSEL OR SITE)  07/08/2021    IR PORT PLACEMENT  09/01/2022    IR TUNNELED CENTRAL LINE PLACEMENT  08/29/2024    IR TUNNELED CENTRAL LINE REMOVAL  10/09/2024    LIVER BIOPSY  7/8/2021    PILONIDAL CYST EXCISION      OH  "CORRECTION HAMMERTOE Right 02/19/2019    Procedure: THIRD HAMMER TOE CORRECTION;  Surgeon: Niranjan Child DPM;  [facility];  Service: Podiatry    MS RMVL MATEO CTR VAD W/SUBQ PORT/ CTR/PRPH INSJ N/A 03/14/2023    Procedure: REMOVAL VENOUS PORT (PORT-A-CATH)IR;  Surgeon: Avelino Vázquez DO;  [facility];  Service: Interventional Radiology    TOE OSTEOTOMY Right 03/14/2017    Procedure: HAMMERTOE CORRECTION R 2 ;  Surgeon: Niranjan Child DPM;  [facility];  Service:     TOE OSTEOTOMY Left 11/24/2020    Procedure: LEFT HT CORRECTION TOE;  Surgeon: Niranjan Child DPM;  [facility];  Service: Podiatry    TONSILLECTOMY  1963    UPPER GASTROINTESTINAL ENDOSCOPY  8/15/2022     Family History   Problem Relation Age of Onset    Cancer Father         Leukemia     he reports that he has never smoked. He has never used smokeless tobacco. He reports that he does not drink alcohol and does not use drugs.  Current Outpatient Medications   Medication Instructions    amiodarone 200 mg, Oral, Daily    apixaban (ELIQUIS) 5 mg, Oral, 2 times daily    Cholecalciferol 5,000 Units, Oral, Daily    Empagliflozin 25 mg, Oral, Every morning    furosemide (LASIX) 20 mg, Oral, Daily    losartan (COZAAR) 100 mg, Oral, Daily    metFORMIN (GLUCOPHAGE) 1,000 mg, Oral, 2 times daily with meals    methocarbamol (ROBAXIN) 750 mg, Oral, Every 6 hours PRN    metoprolol tartrate (LOPRESSOR) 50 mg, Oral, Every 12 hours scheduled    Multiple Vitamins-Minerals (Centrum Silver 50+Men) TABS No dose, route, or frequency recorded.    rosuvastatin (CRESTOR) 40 mg, Oral, Every evening     Allergies   Allergen Reactions    Lisinopril Cough      Objective   /84 (BP Location: Left arm, Patient Position: Sitting, Cuff Size: Standard)   Pulse (!) 50   Temp 97.6 °F (36.4 °C) (Temporal)   Resp 18   Ht 6' 3\" (1.905 m)   Wt (!) 138 kg (305 lb)   SpO2 97%   BMI 38.12 kg/m²     Physical Exam  Vitals reviewed. Exam conducted " with a chaperone present (Wife).   Constitutional:       General: He is not in acute distress.     Appearance: He is not ill-appearing, toxic-appearing or diaphoretic.   HENT:      Head: Normocephalic and atraumatic.   Eyes:      General: No scleral icterus.        Left eye: No discharge.   Neck:      Comments: Limitation in cervical range of motion secondary to cervical kyphosis/near swan-neck deformity  Pulmonary:      Effort: Pulmonary effort is normal.      Breath sounds: No wheezing.   Musculoskeletal:         General: Deformity (Cervical kyphosis deformity) present. Normal range of motion.      Right lower leg: Edema (Dressing on right foot and walking shoe) present.      Left lower leg: No edema.   Skin:     General: Skin is warm and dry.   Neurological:      Mental Status: He is alert.      Motor: Weakness (Right foot, dorsi plantarflexion and DP weakness) present.      Deep Tendon Reflexes:      Reflex Scores:       Bicep reflexes are 1+ on the left side.       Patellar reflexes are 1+ on the right side and 1+ on the left side.  Psychiatric:         Mood and Affect: Mood normal.         Behavior: Behavior normal.       Neurological Exam  Mental Status  Alert. Oriented to person, place, time and situation.    Motor  Decreased muscle bulk throughout. Normal muscle tone.    Reflexes                                            Right                      Left  Biceps                                                         1+  Patellar                                1+                         1+    Right pathological reflexes: Anastacia's absent. Ankle clonus absent.  Left pathological reflexes: Anastacia's absent. Ankle clonus absent.    Gait  Casual gait is normal including stance, stride, and arm swing.  Is wearing a walking shoe on the right foot secondary to diabetic ulcer..      Radiology Results Review: I have reviewed radiology reports from [facility] including: xray(s).    Administrative Statements   I have  spent a total time of 35 minutes in caring for this patient on the day of the visit/encounter including Diagnostic results, Risks and benefits of tx options, Instructions for management, Patient and family education, Importance of tx compliance, Risk factor reductions, Impressions, Counseling / Coordination of care, Documenting in the medical record, Reviewing/placing orders in the medical record (including tests, medications, and/or procedures), Obtaining or reviewing history  , and Communicating with other healthcare professionals .

## 2025-04-06 NOTE — ASSESSMENT & PLAN NOTE
As addressed in HPI.  Cervical osteomyelitis/discitis C5-C6 as a complication of MSSA bacteremia.  As per ID note on 12/19/2024 most recent MRI of C-spine on 10/31/2024 with substantial improvement and resolution of phlegmon.  Inflammatory markers have appropriately trended down with treatment and have now normalized.  Discontinue antibiotic as inflammatory markers have normalized.  Presents for 3-month follow-up visit after last visit on 12/26/2024 with completed imaging and reassessment of cervical kyphosis at the level of osteomyelitis to determine if surgical intervention is indicated.  Visit is scheduled with [facility] and spine surgeon Dr. SANTIZO.He demonstrates significant cervical kyphosis.  Patient verbalized he is not interested in cervical spine surgery.  Reports he has started physical therapy and is diligently performing home exercises.  He is hoping that home exercises with strengthening his cervical core and paraspinal muscular turgor to improve his cervical posture and that no surgical intervention will be indicated.  He denies pain, rates it 0/10.  Reports limitation in cervical range of motion is unable to look up.  Patient is wearing a walking shoe on his right foot as he continues to have a diabetic ulcer that he reports is improving    Imaging  3/28/2025 x-ray cervical spine complete 6+ with flexion and extension-There is stable sequela of discitis osteomyelitis at C5-C6 with loss of vertebral body height and focal kyphosis at this level. There is disc space narrowing at this level. No new endplate erosive changes identified. There is no significant spondylolisthesis .  Intervertebral disc heights are maintained.No new endplate erosive changes identified.     Imaging reviewed in collaboration with Dr. SANTIZO, swan-neck deformity, no plan for surgical intervention at this time.  Patient should continue conservative management with physical therapy and HEP.    Plan  Reviewed x-ray imaging with patient and  wife  Reinforced the need for diligent compliance with home exercises provided by physical therapy, integrate exercises into his lifestyle for cervical paraspinal spinal and core muscle strengthening and exercises to improve cervical posture.  Patient verbalized again he is not interested in surgical intervention.  Reinforced with patient ongoing monitoring of symptoms he experienced when initially diagnosed with discitis osteomyelitis if recur he should report to the closest emergency department for immediate assessment.  Reviewed red flag signs of cervical myelopathic advised if he experiences he should go to the closest emergency room for assessment.  Advised if he has deterioration and progression of cervical kyphosis with associated pain and discomfort he should notify neurosurgery for a follow-up appointment.  Advised patient if he has additional questions or concerns he should contact the neurosurgery department.  Advised patient no further imaging indicated and return to office as needed.

## 2025-04-17 ENCOUNTER — HOSPITAL ENCOUNTER (OUTPATIENT)
Dept: PULMONOLOGY | Facility: HOSPITAL | Age: 70
End: 2025-04-17
Payer: MEDICARE

## 2025-04-17 DIAGNOSIS — I48.0 PAF (PAROXYSMAL ATRIAL FIBRILLATION) (HCC): ICD-10-CM

## 2025-04-17 PROCEDURE — 94729 DIFFUSING CAPACITY: CPT

## 2025-04-17 PROCEDURE — 94010 BREATHING CAPACITY TEST: CPT

## 2025-04-17 PROCEDURE — 94010 BREATHING CAPACITY TEST: CPT | Performed by: INTERNAL MEDICINE

## 2025-04-17 PROCEDURE — 94726 PLETHYSMOGRAPHY LUNG VOLUMES: CPT | Performed by: INTERNAL MEDICINE

## 2025-04-17 PROCEDURE — 94760 N-INVAS EAR/PLS OXIMETRY 1: CPT

## 2025-04-17 PROCEDURE — 94729 DIFFUSING CAPACITY: CPT | Performed by: INTERNAL MEDICINE

## 2025-04-17 PROCEDURE — 94726 PLETHYSMOGRAPHY LUNG VOLUMES: CPT

## 2025-04-18 ENCOUNTER — RESULTS FOLLOW-UP (OUTPATIENT)
Dept: CARDIOLOGY CLINIC | Facility: CLINIC | Age: 70
End: 2025-04-18

## 2025-04-24 DIAGNOSIS — I48.91 NEW ONSET A-FIB (HCC): ICD-10-CM

## 2025-04-28 ENCOUNTER — RESULTS FOLLOW-UP (OUTPATIENT)
Dept: FAMILY MEDICINE CLINIC | Facility: CLINIC | Age: 70
End: 2025-04-28

## 2025-04-28 ENCOUNTER — TELEPHONE (OUTPATIENT)
Dept: FAMILY MEDICINE CLINIC | Facility: CLINIC | Age: 70
End: 2025-04-28

## 2025-04-28 DIAGNOSIS — Z79.899 ON AMIODARONE THERAPY: Primary | ICD-10-CM

## 2025-04-28 RX ORDER — AMIODARONE HYDROCHLORIDE 200 MG/1
200 TABLET ORAL DAILY
Qty: 90 TABLET | Refills: 0 | Status: SHIPPED | OUTPATIENT
Start: 2025-04-28

## 2025-04-28 NOTE — TELEPHONE ENCOUNTER
"Attempted to reschedule 6/20/2025 - Pt will call back to r/s his PE 6/20/2025 w/Dr Lazo. Aware provider will be out of the office, states \"I dont have my calendar in front of me so I'll have to get back to you.\"  "

## 2025-05-05 ENCOUNTER — APPOINTMENT (OUTPATIENT)
Dept: LAB | Facility: HOSPITAL | Age: 70
End: 2025-05-05
Payer: MEDICARE

## 2025-05-05 DIAGNOSIS — C82.08 FOLLICULAR LYMPHOMA GRADE I OF LYMPH NODES OF MULTIPLE SITES (HCC): ICD-10-CM

## 2025-05-05 DIAGNOSIS — C48.1: ICD-10-CM

## 2025-05-05 DIAGNOSIS — R74.01 TRANSAMINITIS: ICD-10-CM

## 2025-05-05 DIAGNOSIS — Z79.899 ON AMIODARONE THERAPY: ICD-10-CM

## 2025-05-05 DIAGNOSIS — C82.08 FOLLICULAR LYMPHOMA GRADE I OF LYMPH NODES OF MULTIPLE SITES (HCC): Primary | ICD-10-CM

## 2025-05-05 LAB
ALBUMIN SERPL BCG-MCNC: 4.8 G/DL (ref 3.5–5)
ALP SERPL-CCNC: 59 U/L (ref 34–104)
ALT SERPL W P-5'-P-CCNC: 100 U/L (ref 7–52)
ANION GAP SERPL CALCULATED.3IONS-SCNC: 7 MMOL/L (ref 4–13)
AST SERPL W P-5'-P-CCNC: 70 U/L (ref 13–39)
BASOPHILS # BLD AUTO: 0.11 THOUSANDS/ÂΜL (ref 0–0.1)
BASOPHILS NFR BLD AUTO: 1 % (ref 0–1)
BILIRUB SERPL-MCNC: 0.36 MG/DL (ref 0.2–1)
BUN SERPL-MCNC: 22 MG/DL (ref 5–25)
CALCIUM SERPL-MCNC: 10.5 MG/DL (ref 8.4–10.2)
CHLORIDE SERPL-SCNC: 101 MMOL/L (ref 96–108)
CO2 SERPL-SCNC: 34 MMOL/L (ref 21–32)
CREAT SERPL-MCNC: 1.61 MG/DL (ref 0.6–1.3)
EOSINOPHIL # BLD AUTO: 0.32 THOUSAND/ÂΜL (ref 0–0.61)
EOSINOPHIL NFR BLD AUTO: 4 % (ref 0–6)
ERYTHROCYTE [DISTWIDTH] IN BLOOD BY AUTOMATED COUNT: 16.6 % (ref 11.6–15.1)
GFR SERPL CREATININE-BSD FRML MDRD: 43 ML/MIN/1.73SQ M
GLUCOSE P FAST SERPL-MCNC: 145 MG/DL (ref 65–99)
HCT VFR BLD AUTO: 45.3 % (ref 36.5–49.3)
HGB BLD-MCNC: 13.9 G/DL (ref 12–17)
IMM GRANULOCYTES # BLD AUTO: 0.08 THOUSAND/UL (ref 0–0.2)
IMM GRANULOCYTES NFR BLD AUTO: 1 % (ref 0–2)
LYMPHOCYTES # BLD AUTO: 1.12 THOUSANDS/ÂΜL (ref 0.6–4.47)
LYMPHOCYTES NFR BLD AUTO: 14 % (ref 14–44)
MCH RBC QN AUTO: 28 PG (ref 26.8–34.3)
MCHC RBC AUTO-ENTMCNC: 30.7 G/DL (ref 31.4–37.4)
MCV RBC AUTO: 91 FL (ref 82–98)
MONOCYTES # BLD AUTO: 0.46 THOUSAND/ÂΜL (ref 0.17–1.22)
MONOCYTES NFR BLD AUTO: 6 % (ref 4–12)
NEUTROPHILS # BLD AUTO: 6.2 THOUSANDS/ÂΜL (ref 1.85–7.62)
NEUTS SEG NFR BLD AUTO: 74 % (ref 43–75)
NRBC BLD AUTO-RTO: 0 /100 WBCS
PLATELET # BLD AUTO: 187 THOUSANDS/UL (ref 149–390)
PMV BLD AUTO: 10 FL (ref 8.9–12.7)
POTASSIUM SERPL-SCNC: 4.7 MMOL/L (ref 3.5–5.3)
PROT SERPL-MCNC: 7.9 G/DL (ref 6.4–8.4)
RBC # BLD AUTO: 4.96 MILLION/UL (ref 3.88–5.62)
SODIUM SERPL-SCNC: 142 MMOL/L (ref 135–147)
TSH SERPL DL<=0.05 MIU/L-ACNC: 61.3 UIU/ML (ref 0.45–4.5)
WBC # BLD AUTO: 8.29 THOUSAND/UL (ref 4.31–10.16)

## 2025-05-05 PROCEDURE — 80053 COMPREHEN METABOLIC PANEL: CPT

## 2025-05-05 PROCEDURE — 84439 ASSAY OF FREE THYROXINE: CPT

## 2025-05-05 PROCEDURE — 84443 ASSAY THYROID STIM HORMONE: CPT

## 2025-05-05 PROCEDURE — 36415 COLL VENOUS BLD VENIPUNCTURE: CPT

## 2025-05-05 PROCEDURE — 85025 COMPLETE CBC W/AUTO DIFF WBC: CPT

## 2025-05-06 ENCOUNTER — PATIENT MESSAGE (OUTPATIENT)
Dept: FAMILY MEDICINE CLINIC | Facility: CLINIC | Age: 70
End: 2025-05-06

## 2025-05-06 LAB — T4 FREE SERPL-MCNC: <0.25 NG/DL (ref 0.61–1.12)

## 2025-05-07 DIAGNOSIS — E13.9 DIABETES 1.5, MANAGED AS TYPE 2 (HCC): ICD-10-CM

## 2025-05-07 DIAGNOSIS — N18.2 HYPERTENSIVE KIDNEY DISEASE WITH STAGE 2 CHRONIC KIDNEY DISEASE: ICD-10-CM

## 2025-05-07 DIAGNOSIS — N18.30 TYPE 2 DIABETES MELLITUS WITH STAGE 3 CHRONIC KIDNEY DISEASE, UNSPECIFIED WHETHER LONG TERM INSULIN USE, UNSPECIFIED WHETHER STAGE 3A OR 3B CKD (HCC): ICD-10-CM

## 2025-05-07 DIAGNOSIS — I12.9 HYPERTENSIVE KIDNEY DISEASE WITH STAGE 2 CHRONIC KIDNEY DISEASE: ICD-10-CM

## 2025-05-07 DIAGNOSIS — E11.22 TYPE 2 DIABETES MELLITUS WITH STAGE 3 CHRONIC KIDNEY DISEASE, UNSPECIFIED WHETHER LONG TERM INSULIN USE, UNSPECIFIED WHETHER STAGE 3A OR 3B CKD (HCC): ICD-10-CM

## 2025-05-08 ENCOUNTER — OFFICE VISIT (OUTPATIENT)
Dept: FAMILY MEDICINE CLINIC | Facility: CLINIC | Age: 70
End: 2025-05-08
Payer: MEDICARE

## 2025-05-08 VITALS
TEMPERATURE: 97 F | WEIGHT: 312 LBS | BODY MASS INDEX: 38.79 KG/M2 | SYSTOLIC BLOOD PRESSURE: 124 MMHG | DIASTOLIC BLOOD PRESSURE: 82 MMHG | HEIGHT: 75 IN | HEART RATE: 62 BPM | OXYGEN SATURATION: 95 %

## 2025-05-08 DIAGNOSIS — C48.1: ICD-10-CM

## 2025-05-08 DIAGNOSIS — N18.30 TYPE 2 DIABETES MELLITUS WITH STAGE 3 CHRONIC KIDNEY DISEASE, WITHOUT LONG-TERM CURRENT USE OF INSULIN, UNSPECIFIED WHETHER STAGE 3A OR 3B CKD (HCC): ICD-10-CM

## 2025-05-08 DIAGNOSIS — E03.9 ACQUIRED HYPOTHYROIDISM: Primary | ICD-10-CM

## 2025-05-08 DIAGNOSIS — E11.22 TYPE 2 DIABETES MELLITUS WITH STAGE 3 CHRONIC KIDNEY DISEASE, WITHOUT LONG-TERM CURRENT USE OF INSULIN, UNSPECIFIED WHETHER STAGE 3A OR 3B CKD (HCC): ICD-10-CM

## 2025-05-08 DIAGNOSIS — E66.01 OBESITY, MORBID (HCC): ICD-10-CM

## 2025-05-08 DIAGNOSIS — E08.621 DIABETIC ULCER OF TOE OF RIGHT FOOT ASSOCIATED WITH DIABETES MELLITUS DUE TO UNDERLYING CONDITION, LIMITED TO BREAKDOWN OF SKIN (HCC): ICD-10-CM

## 2025-05-08 DIAGNOSIS — I48.91 NEW ONSET A-FIB (HCC): ICD-10-CM

## 2025-05-08 DIAGNOSIS — R60.0 BILATERAL LOWER EXTREMITY EDEMA: ICD-10-CM

## 2025-05-08 DIAGNOSIS — L97.511 DIABETIC ULCER OF TOE OF RIGHT FOOT ASSOCIATED WITH DIABETES MELLITUS DUE TO UNDERLYING CONDITION, LIMITED TO BREAKDOWN OF SKIN (HCC): ICD-10-CM

## 2025-05-08 PROBLEM — J96.01 ACUTE RESPIRATORY FAILURE WITH HYPOXIA (HCC): Status: RESOLVED | Noted: 2024-07-29 | Resolved: 2025-05-08

## 2025-05-08 LAB — SL AMB POCT HEMOGLOBIN AIC: 7.2 (ref ?–6.5)

## 2025-05-08 PROCEDURE — G2211 COMPLEX E/M VISIT ADD ON: HCPCS | Performed by: FAMILY MEDICINE

## 2025-05-08 PROCEDURE — 83036 HEMOGLOBIN GLYCOSYLATED A1C: CPT | Performed by: FAMILY MEDICINE

## 2025-05-08 PROCEDURE — 99214 OFFICE O/P EST MOD 30 MIN: CPT | Performed by: FAMILY MEDICINE

## 2025-05-08 RX ORDER — LEVOTHYROXINE SODIUM 75 UG/1
75 TABLET ORAL
Qty: 100 TABLET | Refills: 3 | Status: SHIPPED | OUTPATIENT
Start: 2025-05-08

## 2025-05-08 RX ORDER — FUROSEMIDE 20 MG/1
20 TABLET ORAL DAILY
Qty: 90 TABLET | Refills: 1 | Status: SHIPPED | OUTPATIENT
Start: 2025-05-08

## 2025-05-08 RX ORDER — ROSUVASTATIN CALCIUM 40 MG/1
40 TABLET, COATED ORAL EVERY EVENING
Qty: 90 TABLET | Refills: 1 | Status: SHIPPED | OUTPATIENT
Start: 2025-05-08

## 2025-05-08 NOTE — TELEPHONE ENCOUNTER
Reason for call:   [] Refill   [] Prior Auth  [x] Other: Pt called for rx refill on his amidarone 200 mg, but its pending for refill. Pt aware

## 2025-05-08 NOTE — PROGRESS NOTES
"Name: Augustus Coffman      : 1955      MRN: [id number]  Encounter Provider: Gwen Lazo DO  Encounter Date: 2025   Encounter department: [facility] [address]  :  Assessment & Plan  Bilateral lower extremity edema    Orders:  •  furosemide (LASIX) 20 mg tablet; Take 1 tablet (20 mg total) by mouth daily    New onset a-fib (HCC)    Orders:  •  apixaban (ELIQUIS) 5 mg; Take 1 tablet (5 mg total) by mouth 2 (two) times a day    Type 2 diabetes mellitus with stage 3 chronic kidney disease, without long-term current use of insulin, unspecified whether stage 3a or 3b CKD (HCC)    Lab Results   Component Value Date    HGBA1C 7.2 (A) 2025       Orders:  •  POCT hemoglobin A1c    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HCC)         Diabetic ulcer of toe of right foot associated with diabetes mellitus due to underlying condition, limited to breakdown of skin (HCC)    Lab Results   Component Value Date    HGBA1C 7.2 (A) 2025            Obesity, morbid (HCC)           Acquired hypothyroidism    Orders:  •  US thyroid; Future  •  levothyroxine 75 mcg tablet; Take 1 tablet (75 mcg total) by mouth daily in the early morning  •  TSH, 3rd generation with Free T4 reflex; Future         History of Present Illness   Diabetes    Reports fatigue and weight gain. Notes some sore throat about 1 month ago. States that this has been day and night. Denies pain with swallowing. Denies issues with swallowing food or liquids. Denies any swelling in his neck, denies any lumps or bumps.       Has had weight gain and fatigue.     Notes that he has not changed his appetite. States that he has not had any CP or SOB, small amount of LE edema per patient. Non pitting, does not come and go.      Has follow up imaging on 25 of CT C/A/P for his oncologist.     Review of Systems    Objective   /82   Pulse 62   Temp (!) 97 °F (36.1 °C)   Ht 6' 3\" (1.905 m)   Wt (!) 142 kg (312 lb)  "  SpO2 95%   BMI 39.00 kg/m²      Physical Exam  Vitals reviewed.   Constitutional:       General: He is not in acute distress.     Appearance: Normal appearance.      Comments: Forward head carriage    HENT:      Head: Normocephalic and atraumatic.      Right Ear: External ear normal.      Left Ear: External ear normal.      Nose: Nose normal.      Mouth/Throat:      Mouth: Mucous membranes are moist.     Eyes:      Extraocular Movements: Extraocular movements intact.      Conjunctiva/sclera: Conjunctivae normal.      Pupils: Pupils are equal, round, and reactive to light.     Neck:      Thyroid: No thyroid mass, thyromegaly or thyroid tenderness.     Cardiovascular:      Rate and Rhythm: Normal rate and regular rhythm.      Heart sounds: Normal heart sounds.   Pulmonary:      Effort: Pulmonary effort is normal.      Breath sounds: Normal breath sounds. No wheezing, rhonchi or rales.   Abdominal:      General: Bowel sounds are normal. There is no distension.      Palpations: Abdomen is soft.      Tenderness: There is no abdominal tenderness.     Musculoskeletal:      Cervical back: Neck supple.      Right lower leg: Edema present.      Left lower leg: Edema present.   Lymphadenopathy:      Cervical: No cervical adenopathy.     Skin:     General: Skin is warm.      Capillary Refill: Capillary refill takes less than 2 seconds.      Findings: No rash.     Neurological:      Mental Status: He is alert. Mental status is at baseline.           DO Justin Garza [facility]  5/30/2025 5:05 PM

## 2025-05-08 NOTE — ASSESSMENT & PLAN NOTE
Lab Results   Component Value Date    HGBA1C 7.2 (A) 05/08/2025       Orders:  •  POCT hemoglobin A1c

## 2025-05-13 ENCOUNTER — HOSPITAL ENCOUNTER (OUTPATIENT)
Dept: ULTRASOUND IMAGING | Facility: CLINIC | Age: 70
Discharge: HOME | End: 2025-05-13
Payer: MEDICARE

## 2025-05-13 DIAGNOSIS — E03.9 ACQUIRED HYPOTHYROIDISM: ICD-10-CM

## 2025-05-13 PROCEDURE — 76536 US EXAM OF HEAD AND NECK: CPT

## 2025-05-20 ENCOUNTER — HOSPITAL ENCOUNTER (OUTPATIENT)
Dept: CT IMAGING | Facility: HOSPITAL | Age: 70
Discharge: HOME | End: 2025-05-20
Attending: INTERNAL MEDICINE
Payer: MEDICARE

## 2025-05-20 DIAGNOSIS — C82.08 FOLLICULAR LYMPHOMA GRADE I OF LYMPH NODES OF MULTIPLE SITES (HCC): ICD-10-CM

## 2025-05-20 PROCEDURE — 71260 CT THORAX DX C+: CPT

## 2025-05-20 PROCEDURE — 74177 CT ABD & PELVIS W/CONTRAST: CPT

## 2025-05-20 RX ADMIN — IOHEXOL 100 ML: 350 INJECTION, SOLUTION INTRAVENOUS at 12:59

## 2025-05-20 NOTE — ASSESSMENT & PLAN NOTE
This is a 69 y.o. M c Follicular Lymphoma, WHO grade 1-2 on observation  Orders:    CBC and differential; Future    Comprehensive metabolic panel; Future    CT chest abdomen pelvis w contrast; Future

## 2025-05-20 NOTE — PROGRESS NOTES
Name: Augustus Coffman      : 1955      MRN: [id number]  Encounter Provider: Naomi Cummings MD  Encounter Date: 2025   Encounter department: [facility]AT[facility] [place]  Administrative Statements   Encounter provider Naomi Cummings MD    The Patient is located at Home and in the following state in which I hold an active license [place].    The patient was identified by name and date of birth. Augustus Coffman was informed that this is a telemedicine visit and that the visit is being conducted through the Epic Embedded platform. He agrees to proceed..  My office door was closed. No one else was in the room.  He acknowledged consent and understanding of privacy and security of the video platform. The patient has agreed to participate and understands they can discontinue the visit at any time.    I have spent a total time of 31 minutes in caring for this patient on the day of the visit/encounter including Diagnostic results, Counseling / Coordination of care, Documenting in the medical record, Reviewing/placing orders in the medical record (including tests, medications, and/or procedures), and Obtaining or reviewing history  , not including the time spent for establishing the audio/video connection.      Assessment & Plan  Follicular lymphoma grade I of lymph nodes of multiple sites (HCC)  This is a 69 y.o. M c Follicular Lymphoma, WHO grade 1-2 on observation  Orders:    CBC and differential; Future    Comprehensive metabolic panel; Future    CT chest abdomen pelvis w contrast; Future      Assessment & Plan        Naomi Cummings MD    F/u 6 months      No follow-ups on file.    History of Present Illness   No chief complaint on file.  History of Present Illness        Objective   There were no vitals taken for this visit.      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 Follicular lymphoma of intra-abdominal lymph nodes, unspecified follicular lymphoma type (HCC)    Chief Complaint:    Follow up after chemotherapy treatments     Assessment/Plan:   This is a 69 y.o. M c Follicular Lymphoma, WHO grade 1-2 on observation, initially diagnosed 07/8/2021.  Patient was on observation and had no constitutional symptoms.  Recently, he developed complaint of chest pain likely related to epigastric pain with burning sensation.  He then began having discomfort in the abdomen in other areas.  Also began having diarrhea.  He had extensive workup from GI which was unrevealing.  GI believed he had symptoms related to his follicular lymphoma.  As a result, treatment was indicated.  Patient started BR treatment Q 28 days on September 6, 2022. 6 cycles given. Restaging PET/CT 2/20/2023 showed no metabolic activity.      Discussion of decision making    I personally reviewed the following lab results, the image studies, pathology, other specialty/physicians consult notes and recommendations, and outside medical records from [facility]/other institutions. I had a lengthy discussion with the patient and shared the work-up findings. I spent * minutes reviewing the records (labs, clinician notes, outside records, medical history, ordering medicine/tests/procedures, interpreting the imaging/labs previously done) and coordination of care as well as direct time with the patient today, of which greater than 50% of the time was spent in counseling and coordination of care with the patient/family.    Plan/Labs  Restaging CT CAP w/c in 6 months with CMP, CBC  Cont rehab and recovery prn    Follow Up: 6 months (once a year in person in [place], virtual [facility])    All questions were answered to the patient's satisfaction during this encounter. The patient knows the contact information for our office and knows to reach out for any  relevant concerns related to this encounter. They are to call for any temperature 100.4 or higher, new symptoms including but not restricted to shaking chills, decreased appetite, nausea, vomiting, diarrhea, increased fatigue, shortness of breath or chest pain, confusion, and not feeling the strength to come to the clinic. For all other listed problems and medical diagnosis in their chart - they are managed by PCP and/or other specialists, which the patient acknowledges. Thank you very much for your consultation and making us a part of this patient's care. We are continuing to follow closely with you. Please do not hesitate to reach out to me with any additional questions or concerns.     Naomi Cummings MD  Physician, Medical Oncology-Hematology    AJCC 8th Edition Cancer Stage :      Cancer Staging  At least III, likely was IV    Hematology/Oncology History:   - incidentally diagnosed while doing imaging for aortic aneurysm, showed retroperitoneum and mesenteric lymphadenopathy, largest lymph node about 3 cm, status post biopsy showed follicular lymphoma; grade 1-2;   - 7/2021 :  Developed a big hematoma post biopsy.  Hospitalized.  Hemoglobin dropped to 7.8 then stable.  -  8/2021 : Hb recovered. PET showed moderate tumor burden. Does not fit GELF criteria for treatment; FLIPI score = 2, age +  Stage 3. PET CT showed low SUV. Decided to observe.   - Feb 28, 2022 US Head and neck:Patient's lump in the right submandibular region corresponds to an enlarged, heterogeneous submandibular lymph node measuring 1.8 x 1.1 x 1.9 cm (image 18.) Review of the prior PET/CT from 8/10/2021 demonstrate an enlarged, hypermetabolic node in this area, therefore this is likely part of the patient's known follicular lymphoma. Difficult to assess interval change because of the difference in imaging modality  - May 12, 2022, CT CAP w/o c with interval increase in mesenteric lymph nodes, new pulmonary nodules. Conglomerate kennedy mass  at the root of the small bowel mesentery on image 82 of series 2 measures approximately 12.0 x 7.5 cm, increased from 8.9 x 6.3 cm when measured using similar technique on July 10, 2021.  Discrete upper retroperitoneal nodes are slightly increased from July 10, 2021, specifically an aortocaval node measuring 2.9 x 2.3 cm on image 74 of series 2, increased from 2.2 x 2.0 cm on prior exam and an anterior left periaortic node measuring 2.4 x 2.0 cm on image 76 of series 2 increased from 2.1 x 1.7 cm on prior examination    August 6, 2022, CT abd, pelvis w/o contrast:  8.6 cm low-attenuation focus within the spleen which is not present on CT examination of 5/12/2022. Differential considerations include splenic abscess or progression of patient's follicular lymphoma. This finding can be further evaluated with contrast-enhanced MR abdomen.  Progression of upper abdominal lymphadenopathy. Mesenteric and retroperitoneal lymph nodes kennedy burden appears similar to 5/12/2022.  New 2.6 cm cystic focus immediately deep to the umbilicus likely to represent additional disease spread.    August 8, 2022, patient had an EGD showing erythema in antrum.  Small proximal 1 cm sliding hiatal hernia.  Otherwise unrevealing findings.  Biopsies were benign.  August 19, 2022, chronic hepatitis panel was negative.  September 6th, 2022 started cycle 1, day 1 of Bendamustine + Rituxan Q28 days and completed 6 cycles  2/20/2023 PET/CT: Retroperitoneal, iliac chain, and inguinal lymphadenopathy as described.  However, the lymph nodes are not significantly metabolically active on PET,  with uptake less than the mediastinal blood pool (Deauville score 2). Large low-attenuation area within the spleen measuring 3.5 x 3.9 cm measures smaller compared to the CT of 11/21/2022 and is not metabolically active on PET  8/21/2023: CT CAP w/c: No evidence of lymphoma in the chest. Previous nodules have resolved. Overall improved lymphadenopathy in the abdomen  and pelvis, particularly in the spleen, mesentery and upper retroperitoneum. Pelvic lymphadenopathy is essentially unchanged. New nodular focus along the right seminal vesicle which may represent an enlarged lymph node. This may also be related to lymphoma, though given the overall stability/improvement in lymphadenopathy elsewhere, a distinct etiology, possibly related to the prostate, is not excluded  2/26/2024 CT CAP w/c: small 2 mm stone, iliac lymph node measuring 1 cm on the right side and left side right-sided pelvic sidewall lymph node measuring 1.2 cm, stable. Left common iliac lymph node measuring 1.3 cm, stable. Para-aortic lymph node measuring 1.1 cm, stable. Aortocaval lymph node measuring about 1.2 cm, stable. Portacaval lymph nodes are seen. Mesenteric lymph nodes are seen measuring about 6 to 7 mm  11/18/2024 CT CAP w/c:  Almost complete resolution of left effusion with resolution of pericardial effusion and stable right pleural effusion.There is interval progression of retroperitoneal adenopathy with aortocaval node with a short axis of 17 mm series 2/133 compared to 12 mm on the previous exam. Aortocaval node on series 2, image 139 measures 15 mm compared to 10 mm. Multiple small nodes are present in the mesentery series 2/159, less than 1 cm in diameter but increased in number compared to the previous exam. There is also stranding of the mesenteric fat. Left common iliac node measures 13 mm on 2/179 compared to 10 mm on the previous exam. Small external iliac nodes are demonstrated with short axis of 9 mm on series 2 image 206 with enlargement of the left common femoral node measuring 12 mm on series 2 image 221 compared to 8 mm on the previous exam  5/20/2025: CT CAP w/c: Interval improvement in the mesenteric and retroperitoneal lymphadenopathy. There are persistent borderline enlarged pelvic lymph nodes. There is a persistent borderline enlarged right common iliac lymph node measuring 10 mm  short axis on 2/2011. This previously measured 12 mm in short axis. There is also a left external iliac lymph node measuring 11-12 mm in short axis on 2/240, grossly unchanged from the prior study.    History of Present Illiness:   Augustus Coffman is a 69 y.o. male with the above-noted HemOnc history who is here  to follow-up regarding history of follicular lymphoma.    Patient has history of GERD, chest pain likely related to GERD.  He also has been having decreased appetite causing some weight loss.  He still has sensation of upset stomach.  GI physicians spoke with my attending who believes his symptoms could be related to his follicular lymphoma.  Patient was started on BR treatment Q 28 days September 6, 2022.    Interval history   Having ongoing recovery from his C-spine injury. No night sweats, fatigue. 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 Recovering from a cold.    Denies F/C, N/V, SOB, CP, LH, HA, rash, itching, gen weakness, melena, hematuria, hematochezia, falls, diarrhea, or constipation      ROS: A 10-point of review of systems is obtained and other than the above is noncontributory.     Objective:   VITALS:   There were no vitals taken for this visit.    Weight at last visit: 215 lbs    Below not updated as this was a televisit    Physical EXAM:  General:  Alert, cooperative, no distress, appears stated age.   Head:  Normocephalic, without obvious abnormality, atraumatic.   Eyes:  Conjunctivae/corneas clear. EOMs intact. No evidence of conjunctivitis     Throat: Lips, mucosa, and tongue normal.  No bleeding from mouth.   Neck: Supple, symmetrical, trachea midline    Lungs:    Respiratory effort easy, nonlabored    Heart:  Regular rate and rhythm, +S1, S2 .   Abdomen:   Soft, non-tender,nondistended.      Extremities:  Lymphatics: Extremities normal, atraumatic, no cyanosis or edema.   No cervical, axillary or inguinal adenopathy   Skin: Skin color, texture, turgor normal.  No rashes.    Neurologic: AAO. No focal neuro deficits       Allergies   Allergen Reactions    Lisinopril Cough       Past Medical History:   Diagnosis Date    Aneurysm (HCC)     Arthritis 2010's    Occasionally flares up    Cancer (HCC) May 2021    Still investigating    Chronic kidney disease     Diabetes mellitus (HCC)     Follicular lymphoma (HCC)     GERD (gastroesophageal reflux disease) June 2021    Noticed in an Endoscopy    Heart murmur May 2020    High cholesterol     Hypertension     Kidney stone 1980's    Have had since 1980's    Obesity Since Childhood       Past Surgical History:   Procedure Laterality Date    BUNIONECTOMY Right 11/24/2020    Procedure: RIGHT HAV CORRECTION,;  Surgeon: Niranjan Child DPM;  [facility];  Service: Podiatry    COLONOSCOPY      INCISION AND DRAINAGE OF WOUND Right 10/05/2016    Procedure: INCISION AND DRAINAGE (I&D) EXTREMITY;  Surgeon: Niranjan Child DPM;  Location[facility];  Service:     IR BIOPSY LYMPH NODE  07/08/2021    IR EMBOLIZATION (SPECIFY VESSEL OR SITE)  07/08/2021    IR PORT PLACEMENT  09/01/2022    IR TUNNELED CENTRAL LINE PLACEMENT  08/29/2024    IR TUNNELED CENTRAL LINE REMOVAL  10/09/2024    LIVER BIOPSY  7/8/2021    PILONIDAL CYST EXCISION      HI CORRECTION HAMMERTOE Right 02/19/2019    Procedure: THIRD HAMMER TOE CORRECTION;  Surgeon: Niranjan Child DPM;  [facility];  Service: Podiatry    HI RMVL MATEO CTR VAD W/SUBQ PORT/ CTR/PRPH INSJ N/A 03/14/2023    Procedure: REMOVAL VENOUS PORT (PORT-A-CATH)IR;  Surgeon: Avelino Vázquez DO;  [facility];  Service: Interventional Radiology    TOE OSTEOTOMY Right 03/14/2017    Procedure: HAMMERTOE CORRECTION R 2 ;  Surgeon: Niranjan Child DPM;  [facility];  Service:     TOE OSTEOTOMY Left 11/24/2020    Procedure: LEFT HT CORRECTION TOE;  Surgeon: Niranjan Child DPM;  [facility];  Service: Podiatry    TONSILLECTOMY  1963    UPPER GASTROINTESTINAL ENDOSCOPY  8/15/2022        Family History   Problem Relation Age of Onset    Cancer Father         Leukemia       Social History     Socioeconomic History    Marital status: /Civil Union     Spouse name: Not on file    Number of children: Not on file    Years of education: Not on file    Highest education level: Not on file   Occupational History    Not on file   Tobacco Use    Smoking status: Never    Smokeless tobacco: Never    Tobacco comments:     Never smoked but exposed to second hand smoke from birth until 1980's   Vaping Use    Vaping status: Never Used   Substance and Sexual Activity    Alcohol use: Never    Drug use: Never    Sexual activity: Not Currently     Partners: Female     Birth control/protection: Abstinence   Other Topics Concern    Not on file   Social History Narrative    Not on file     Social Drivers of Health     Financial Resource Strain: Not on file   Food Insecurity: No Food Insecurity (9/3/2024)    Nursing - Inadequate Food Risk Classification     Worried About Running Out of Food in the Last Year: Never true     Ran Out of Food in the Last Year: Never true     Ran Out of Food in the Last Year: Not on file   Transportation Needs: No Transportation Needs (9/3/2024)    PRAPARE - Transportation     Lack of Transportation (Medical): No     Lack of Transportation (Non-Medical): No   Physical Activity: Not on file   Stress: Not on file   Social Connections: Unknown (6/18/2024)    Received from [facility]     How often do you feel lonely or isolated from those around you? (Adult - for ages 18 years and over): Not on file   Intimate Partner Violence: Not on file   Housing Stability: Low Risk  (9/3/2024)    Housing Stability Vital Sign     Unable to Pay for Housing in the Last Year: No     Number of Times Moved in the Last Year: 0     Homeless in the Last Year: No       Current Outpatient Medications   Medication Sig Dispense Refill    amiodarone 200 mg tablet TAKE 1 TABLET BY MOUTH EVERY  DAY 90 tablet 0    apixaban (ELIQUIS) 5 mg Take 1 tablet (5 mg total) by mouth 2 (two) times a day 60 tablet 5    Cholecalciferol 100 MCG (4000 UT) CAPS Take 1.25 capsules (5,000 Units total) by mouth in the morning      Empagliflozin 25 MG TABS Take 1 tablet (25 mg total) by mouth every morning 90 tablet 2    furosemide (LASIX) 20 mg tablet Take 1 tablet (20 mg total) by mouth daily 90 tablet 1    levothyroxine 75 mcg tablet Take 1 tablet (75 mcg total) by mouth daily in the early morning 100 tablet 3    losartan (COZAAR) 100 MG tablet Take 1 tablet (100 mg total) by mouth daily 90 tablet 1    metFORMIN (GLUCOPHAGE) 500 mg tablet TAKE 2 TABLETS BY MOUTH TWICE A DAY WITH FOOD 360 tablet 1    metoprolol tartrate (LOPRESSOR) 50 mg tablet Take 1 tablet (50 mg total) by mouth every 12 (twelve) hours 60 tablet 2    Multiple Vitamins-Minerals (Centrum Silver 50+Men) TABS       rosuvastatin (CRESTOR) 40 MG tablet TAKE 1 TABLET BY MOUTH EVERY DAY IN THE EVENING 90 tablet 1     Current Facility-Administered Medications   Medication Dose Route Frequency Provider Last Rate Last Admin    lidocaine (LMX) 4 % cream   Topical Once Susie Luu DPM           (Not in a hospital admission)      DATA REVIEW:    Pathology Result:    Final Diagnosis   Date Value Ref Range Status   08/15/2022   Final    A. Stomach, linear antral erythema:  - Gastric antral mucosa with mild chronic active gastritis and regenerative epithelial changes.  - No Helicobacter pylori organisms are identified with immunoperoxidase stain.  - Negative for intestinal metaplasia, dysplasia or carcinoma.     B. Esophagus, irregular z line 39:  - Squamocolumnar junction mucosa with mild chronic inflammation and regenerative epithelial changes.  - No intestinal metaplasia/ dysplasia identified.    C. Esophagogastric junction, small nodule ge junction:  - Fragments of esophageal squamous and gastric glandular mucosa with intestinal metaplasia and  regenerative epithelial changes.  - Intestinal metaplasia identified with Alcian blue/PAS stain.  - Pan keratin stain highlights benign epithelial elements.  - No dysplasia identified. See note.    Note (C): This biopsy shows gastric-type mucosa with scattered goblet cells.  The diagnosis in this case depends on the location of this biopsy.  If this biopsy was taken from the tubular esophagus at least 1 cm above the gastric folds, it represents Ozuna mucosa of the distinctive type.  If this biopsy was taken from the gastric cardia, it represents intestinal metaplasia of the gastric cardia.     Reference: Justin NJ, Bon GW, Katie DG, Jaciel LB; American College of Gastroenterology.  AGC Clinical Guideline: Diagnosis and Management of Ozuna's Esophagus. Am J Gastroenterol. 2016 Jan;111(0)30-50.     07/08/2021   Final    A. Lymph node, periaortic, biopsy:  -  Follicular lymphoma, WHO grade 1-2 (of 3) (see note)       06/28/2021   Final    A. Duodenum:  - Small bowel mucosa with no significant histopathologic abnormality.  - Negative for malabsorption pattern.  - Negative for malignancy.     B. Stomach:  - Gastric mucosa with no significant histopathologic abnormality.  - Negative for H. pylori by routine H&E.  - Negative for malignancy.     C. Esophagus, distal:  - Squamocolumnar mucosa with intestinal metaplasia.  See comment.  - Negative for dysplasia and malignancy.    Comment: The diagnosis in this case depends on the location of this biopsy.  If this biopsy was taken from the tubular esophagus at least 1 cm above the gastric folds, it represents Ozuna mucosa.  If this biopsy was taken from the gastric cardia, it represents intestinal metaplasia of the gastric cardia.          Image Results:  They are reviewed and documented in Hematology/Oncology history    US thyroid  Narrative: THYROID ULTRASOUND    INDICATION: E03.9: Hypothyroidism, unspecified. History of lymphoma.    COMPARISON: CT soft tissue neck  7/31/2024, ultrasound head and neck 3/20/2022, PET/CT 2/20/2023    TECHNIQUE: Ultrasound of the thyroid was performed with a high frequency linear transducer in transverse and sagittal planes including volumetric imaging sweeps as well as traditional still imaging technique.    LIMITATIONS: Exam is limited by patient positioning, unable to lift chin away from chest.    FINDINGS:  Thyroid texture: Normal homogeneous smooth echotexture.    Right lobe: 1.9 x 0.7 x 1.0 cm. Volume 0.6 mL  Left lobe: 1.4 x 0.6 x 1.1 cm. Volume 0.5 mL  Isthmus: Not well visualized.    No discrete nodules.  Impression: Examination is limited by patient positioning with neck flexion. Atrophic thyroid gland without discrete nodule.    Reference: ACR Thyroid Imaging, Reporting and Data System (TI-RADS): White Paper of the ACR TI-RADS Committee. J AM Leona Radiol 2017;14:587-595. Additional recommendations based on American Thyroid Association 2015 guidelines.    Resident: Fredo Garcia I, the attending radiologist, have reviewed the images and agree with the final report above.    Workstation performed: [id number]        LABS:  Lab data are reviewed and documented [facility].     No results found for this or any previous visit (from the past 48 hours).          Naomi Cummings  5/20/2025, 1:05 PM

## 2025-05-27 ENCOUNTER — TELEPHONE (OUTPATIENT)
Dept: HEMATOLOGY ONCOLOGY | Facility: CLINIC | Age: 70
End: 2025-05-27

## 2025-05-27 ENCOUNTER — TELEMEDICINE (OUTPATIENT)
Dept: HEMATOLOGY ONCOLOGY | Facility: CLINIC | Age: 70
End: 2025-05-27
Payer: MEDICARE

## 2025-05-27 ENCOUNTER — TELEPHONE (OUTPATIENT)
Age: 70
End: 2025-05-27

## 2025-05-27 DIAGNOSIS — C82.08 FOLLICULAR LYMPHOMA GRADE I OF LYMPH NODES OF MULTIPLE SITES (HCC): Primary | ICD-10-CM

## 2025-05-27 DIAGNOSIS — I48.91 NEW ONSET A-FIB (HCC): ICD-10-CM

## 2025-05-27 PROCEDURE — 99214 OFFICE O/P EST MOD 30 MIN: CPT | Performed by: INTERNAL MEDICINE

## 2025-05-27 RX ORDER — AMIODARONE HYDROCHLORIDE 200 MG/1
200 TABLET ORAL DAILY
Qty: 90 TABLET | Refills: 3 | Status: SHIPPED | OUTPATIENT
Start: 2025-05-27

## 2025-05-27 NOTE — TELEPHONE ENCOUNTER
Called patient and scheduled CT and virtual FU appt. Reviewed location, dates and times. Patient acknowledged.

## 2025-05-27 NOTE — TELEPHONE ENCOUNTER
Caller: Augustus Coffman     Doctor: Delma Pat PA-C    Reason for call: Patient called regarding his prescription for Amiodarone 200 mg. He said that it was originally prescribed by his PCP, but she told him that he would need any refills to come from Cardiology. He requested a refill on 5/8/25 and it still has not been filled. He said that he is completely out and his PCP will not refill the medication. He is requesting that the refill be sent in by our office ASAP to [facility]pharmacy [id number] - [facility] [place] [place] [facility]. Please advise. Patient requested a call back when it is taken care of.     Call back#: [phone]

## 2025-05-30 RX ORDER — AMIODARONE HYDROCHLORIDE 200 MG/1
200 TABLET ORAL DAILY
Qty: 90 TABLET | Refills: 3 | Status: SHIPPED | OUTPATIENT
Start: 2025-05-30

## 2025-06-19 DIAGNOSIS — I48.91 NEW ONSET A-FIB (HCC): ICD-10-CM

## 2025-06-19 RX ORDER — METOPROLOL TARTRATE 50 MG
50 TABLET ORAL 2 TIMES DAILY
Qty: 200 TABLET | Refills: 1 | Status: SHIPPED | OUTPATIENT
Start: 2025-06-19

## 2025-06-25 ENCOUNTER — RA CDI HCC (OUTPATIENT)
Dept: OTHER | Facility: HOSPITAL | Age: 70
End: 2025-06-25

## 2025-06-26 ENCOUNTER — APPOINTMENT (OUTPATIENT)
Dept: LAB | Facility: HOSPITAL | Age: 70
End: 2025-06-26
Payer: MEDICARE

## 2025-06-26 DIAGNOSIS — E03.9 ACQUIRED HYPOTHYROIDISM: ICD-10-CM

## 2025-06-26 LAB
T4 FREE SERPL-MCNC: 0.53 NG/DL (ref 0.61–1.12)
TSH SERPL DL<=0.05 MIU/L-ACNC: 40.63 UIU/ML (ref 0.45–4.5)

## 2025-06-26 PROCEDURE — 36415 COLL VENOUS BLD VENIPUNCTURE: CPT

## 2025-06-26 PROCEDURE — 84439 ASSAY OF FREE THYROXINE: CPT

## 2025-06-26 PROCEDURE — 84443 ASSAY THYROID STIM HORMONE: CPT

## 2025-06-26 NOTE — PROGRESS NOTES
HCC coding opportunities          Chart Reviewed number of suggestions sent to Provider: 2  I13.0  E11.65     Patients Insurance     Medicare Insurance: Medicare

## 2025-07-03 ENCOUNTER — OFFICE VISIT (OUTPATIENT)
Dept: FAMILY MEDICINE CLINIC | Facility: CLINIC | Age: 70
End: 2025-07-03
Payer: MEDICARE

## 2025-07-03 VITALS
HEART RATE: 62 BPM | TEMPERATURE: 98 F | OXYGEN SATURATION: 98 % | WEIGHT: 307 LBS | DIASTOLIC BLOOD PRESSURE: 84 MMHG | BODY MASS INDEX: 38.17 KG/M2 | HEIGHT: 75 IN | SYSTOLIC BLOOD PRESSURE: 122 MMHG

## 2025-07-03 DIAGNOSIS — E03.9 ACQUIRED HYPOTHYROIDISM: ICD-10-CM

## 2025-07-03 DIAGNOSIS — Z00.00 ENCOUNTER FOR MEDICARE ANNUAL WELLNESS EXAM: Primary | ICD-10-CM

## 2025-07-03 PROCEDURE — 99214 OFFICE O/P EST MOD 30 MIN: CPT | Performed by: FAMILY MEDICINE

## 2025-07-03 PROCEDURE — G0439 PPPS, SUBSEQ VISIT: HCPCS | Performed by: FAMILY MEDICINE

## 2025-07-03 PROCEDURE — G2211 COMPLEX E/M VISIT ADD ON: HCPCS | Performed by: FAMILY MEDICINE

## 2025-07-03 RX ORDER — LEVOTHYROXINE SODIUM 112 UG/1
112 TABLET ORAL
Qty: 100 TABLET | Refills: 3 | Status: CANCELLED | OUTPATIENT
Start: 2025-07-03

## 2025-07-03 RX ORDER — LEVOTHYROXINE SODIUM 100 UG/1
100 TABLET ORAL
Qty: 100 TABLET | Refills: 3 | Status: SHIPPED | OUTPATIENT
Start: 2025-07-03

## 2025-07-03 NOTE — PATIENT INSTRUCTIONS
Medicare Preventive Visit Patient Instructions  Thank you for completing your Welcome to Medicare Visit or Medicare Annual Wellness Visit today. Your next wellness visit will be due in one year (7/4/2026).  The screening/preventive services that you may require over the next 5-10 years are detailed below. Some tests may not apply to you based off risk factors and/or age. Screening tests ordered at today's visit but not completed yet may show as past due. Also, please note that scanned in results may not display below.  Preventive Screenings:  Service Recommendations Previous Testing/Comments   Colorectal Cancer Screening  Colonoscopy    Fecal Occult Blood Test (FOBT)/Fecal Immunochemical Test (FIT)  Fecal DNA/Cologuard Test  Flexible Sigmoidoscopy Age: 45-75 years old   Colonoscopy: every 10 years (May be performed more frequently if at higher risk)  OR  FOBT/FIT: every 1 year  OR  Cologuard: every 3 years  OR  Sigmoidoscopy: every 5 years  Screening may be recommended earlier than age 45 if at higher risk for colorectal cancer. Also, an individualized decision between you and your healthcare provider will decide whether screening between the ages of 76-85 would be appropriate. Colonoscopy: 06/28/2021  FOBT/FIT: Not on file  Cologuard: Not on file  Sigmoidoscopy: Not on file    Screening Current     Prostate Cancer Screening Individualized decision between patient and health care provider in men between ages of 55-69   Medicare will cover every 12 months beginning on the day after your 50th birthday PSA: 0.65 ng/mL           Hepatitis C Screening Once for adults born between 1945 and 1965  More frequently in patients at high risk for Hepatitis C Hep C Antibody: Not on file    Screening Current   Diabetes Screening 1-2 times per year if you're at risk for diabetes or have pre-diabetes Fasting glucose: 145 mg/dL (5/5/2025)  A1C: 7.2 (5/8/2025)  Screening Not Indicated  History Diabetes   Cholesterol Screening Once  every 5 years if you don't have a lipid disorder. May order more often based on risk factors. Lipid panel: 07/29/2024  Screening Not Indicated  History Lipid Disorder      Other Preventive Screenings Covered by Medicare:  Abdominal Aortic Aneurysm (AAA) Screening: covered once if your at risk. You're considered to be at risk if you have a family history of AAA or a male between the age of 65-75 who smoking at least 100 cigarettes in your lifetime.  Lung Cancer Screening: covers low dose CT scan once per year if you meet all of the following conditions: (1) Age 55-77; (2) No signs or symptoms of lung cancer; (3) Current smoker or have quit smoking within the last 15 years; (4) You have a tobacco smoking history of at least 20 pack years (packs per day x number of years you smoked); (5) You get a written order from a healthcare provider.  Glaucoma Screening: covered annually if you're considered high risk: (1) You have diabetes OR (2) Family history of glaucoma OR (3)  aged 50 and older OR (4)  American aged 65 and older  Osteoporosis Screening: covered every 2 years if you meet one of the following conditions: (1) Have a vertebral abnormality; (2) On glucocorticoid therapy for more than 3 months; (3) Have primary hyperparathyroidism; (4) On osteoporosis medications and need to assess response to drug therapy.  HIV Screening: covered annually if you're between the age of 15-65. Also covered annually if you are younger than 15 and older than 65 with risk factors for HIV infection. For pregnant patients, it is covered up to 3 times per pregnancy.    Immunizations:  Immunization Recommendations   Influenza Vaccine Annual influenza vaccination during flu season is recommended for all persons aged >= 6 months who do not have contraindications   Pneumococcal Vaccine   * Pneumococcal conjugate vaccine = PCV13 (Prevnar 13), PCV15 (Vaxneuvance), PCV20 (Prevnar 20)  * Pneumococcal polysaccharide vaccine  = PPSV23 (Pneumovax) Adults 19-63 yo with certain risk factors or if 65+ yo  If never received any pneumonia vaccine: recommend Prevnar 20 (PCV20)  Give PCV20 if previously received 1 dose of PCV13 or PPSV23   Hepatitis B Vaccine 3 dose series if at intermediate or high risk (ex: diabetes, end stage renal disease, liver disease)   Respiratory syncytial virus (RSV) Vaccine - COVERED BY MEDICARE PART D  * RSVPreF3 (Arexvy) CDC recommends that adults 60 years of age and older may receive a single dose of RSV vaccine using shared clinical decision-making (SCDM)   Tetanus (Td) Vaccine - COST NOT COVERED BY MEDICARE PART B Following completion of primary series, a booster dose should be given every 10 years to maintain immunity against tetanus. Td may also be given as tetanus wound prophylaxis.   Tdap Vaccine - COST NOT COVERED BY MEDICARE PART B Recommended at least once for all adults. For pregnant patients, recommended with each pregnancy.   Shingles Vaccine (Shingrix) - COST NOT COVERED BY MEDICARE PART B  2 shot series recommended in those 19 years and older who have or will have weakened immune systems or those 50 years and older     Health Maintenance Due:      Topic Date Due   • Colorectal Cancer Screening  06/26/2031   • Hepatitis C Screening  Completed     Immunizations Due:      Topic Date Due   • COVID-19 Vaccine (4 - 2024-25 season) 09/01/2024   • Influenza Vaccine (1) 09/01/2025     Advance Directives   What are advance directives?  Advance directives are legal documents that state your wishes and plans for medical care. These plans are made ahead of time in case you lose your ability to make decisions for yourself. Advance directives can apply to any medical decision, such as the treatments you want, and if you want to donate organs.   What are the types of advance directives?  There are many types of advance directives, and each state has rules about how to use them. You may choose a combination of any of  the following:  Living will:  This is a written record of the treatment you want. You can also choose which treatments you do not want, which to limit, and which to stop at a certain time. This includes surgery, medicine, IV fluid, and tube feedings.   Durable power of  for healthcare (DPAHC):  This is a written record that states who you want to make healthcare choices for you when you are unable to make them for yourself. This person, called a proxy, is usually a family member or a friend. You may choose more than 1 proxy.  Do not resuscitate (DNR) order:  A DNR order is used in case your heart stops beating or you stop breathing. It is a request not to have certain forms of treatment, such as CPR. A DNR order may be included in other types of advance directives.  Medical directive:  This covers the care that you want if you are in a coma, near death, or unable to make decisions for yourself. You can list the treatments you want for each condition. Treatment may include pain medicine, surgery, blood transfusions, dialysis, IV or tube feedings, and a ventilator (breathing machine).  Values history:  This document has questions about your views, beliefs, and how you feel and think about life. This information can help others choose the care that you would choose.  Why are advance directives important?  An advance directive helps you control your care. Although spoken wishes may be used, it is better to have your wishes written down. Spoken wishes can be misunderstood, or not followed. Treatments may be given even if you do not want them. An advance directive may make it easier for your family to make difficult choices about your care.   Weight Management   Why it is important to manage your weight:  Being overweight increases your risk of health conditions such as heart disease, high blood pressure, type 2 diabetes, and certain types of cancer. It can also increase your risk for osteoarthritis, sleep apnea,  and other respiratory problems. Aim for a slow, steady weight loss. Even a small amount of weight loss can lower your risk of health problems.  How to lose weight safely:  A safe and healthy way to lose weight is to eat fewer calories and get regular exercise. You can lose up about 1 pound a week by decreasing the number of calories you eat by 500 calories each day.   Healthy meal plan for weight management:  A healthy meal plan includes a variety of foods, contains fewer calories, and helps you stay healthy. A healthy meal plan includes the following:  Eat whole-grain foods more often.  A healthy meal plan should contain fiber. Fiber is the part of grains, fruits, and vegetables that is not broken down by your body. Whole-grain foods are healthy and provide extra fiber in your diet. Some examples of whole-grain foods are whole-wheat breads and pastas, oatmeal, brown rice, and bulgur.  Eat a variety of vegetables every day.  Include dark, leafy greens such as spinach, kale, merrill greens, and mustard greens. Eat yellow and orange vegetables such as carrots, sweet potatoes, and winter squash.   Eat a variety of fruits every day.  Choose fresh or canned fruit (canned in its own juice or light syrup) instead of juice. Fruit juice has very little or no fiber.  Eat low-fat dairy foods.  Drink fat-free (skim) milk or 1% milk. Eat fat-free yogurt and low-fat cottage cheese. Try low-fat cheeses such as mozzarella and other reduced-fat cheeses.  Choose meat and other protein foods that are low in fat.  Choose beans or other legumes such as split peas or lentils. Choose fish, skinless poultry (chicken or turkey), or lean cuts of red meat (beef or pork). Before you cook meat or poultry, cut off any visible fat.   Use less fat and oil.  Try baking foods instead of frying them. Add less fat, such as margarine, sour cream, regular salad dressing and mayonnaise to foods. Eat fewer high-fat foods. Some examples of high-fat foods  include french fries, doughnuts, ice cream, and cakes.  Eat fewer sweets.  Limit foods and drinks that are high in sugar. This includes candy, cookies, regular soda, and sweetened drinks.  Exercise:  Exercise at least 30 minutes per day on most days of the week. Some examples of exercise include walking, biking, dancing, and swimming. You can also fit in more physical activity by taking the stairs instead of the elevator or parking farther away from stores. Ask your healthcare provider about the best exercise plan for you.    © Copyright [employer] 2018 Information is for End User's use only and may not be sold, redistributed or otherwise used for commercial purposes. All illustrations and images included in CareNotes® are the copyrighted property of [employer] Inc[employer] or [employer]

## 2025-07-03 NOTE — PROGRESS NOTES
Name: Augustus Coffman      : 1955      MRN: [id number]  Encounter Provider: Gwen Lazo DO  Encounter Date: 7/3/2025   Encounter department: [facility] [address]  Assessment & Plan  Encounter for Medicare annual wellness exam         Acquired hypothyroidism  Will increase synthroid and repeat in 8 weeks.   Orders:  •  levothyroxine 100 mcg tablet; Take 1 tablet (100 mcg total) by mouth daily in the early morning  •  TSH, 3rd generation; Future  •  T4, free; Future       Preventive health issues were discussed with patient, and age appropriate screening tests were ordered as noted in patient's After Visit Summary. Personalized health advice and appropriate referrals for health education or preventive services given if needed, as noted in patient's After Visit Summary.    History of Present Illness     HPI   Patient Care Team:  Gwen Lazo DO as PCP - General (Family Medicine)  MD Tamara Guerrero PA-C as Physician Assistant (Gastroenterology)  Joredn Heath MD (Nephrology)  Naomi Cummings MD (Hematology and Oncology)    Has noticed improvement in energy level with increased synthroid dosing, no longer napping in the afternoon.     Will get eye exam sent over.     Continues to follow with Dr. Luu from podiatry for wound care, continues to heal.     Review of Systems  Medical History Reviewed by provider this encounter:  Tobacco  Allergies  Meds  Problems  Med Hx  Surg Hx  Fam Hx       Annual Wellness Visit Questionnaire   Augustus is here for his Subsequent Wellness visit. Last Medicare Wellness visit information reviewed, patient interviewed, no change since last AWV.     Health Risk Assessment:   Patient rates overall health as fair. Patient feels that their physical health rating is slightly better. Patient is satisfied with their life. Eyesight was rated as same. Hearing was rated as same. Patient feels that their  emotional and mental health rating is slightly better. Patients states they are sometimes angry. Patient states they are sometimes unusually tired/fatigued. Pain experienced in the last 7 days has been some. Patient's pain rating has been 4/10. Patient states that he has experienced weight loss or gain in last 6 months. Gained weight    Fall Risk Screening:   In the past year, patient has experienced: no history of falling in past year      Home Safety:  Patient does not have trouble with stairs inside or outside of their home. Patient has no working smoke alarms and has no working carbon monoxide detector. Home safety hazards include: none.     Nutrition:   Current diet is Diabetic, Low Carb and No Added Salt.     Medications:   Patient is not currently taking any over-the-counter supplements. Patient is able to manage medications.     Activities of Daily Living (ADLs)/Instrumental Activities of Daily Living (IADLs):   Walk and transfer into and out of bed and chair?: Yes  Dress and groom yourself?: Yes    Bathe or shower yourself?: Yes    Feed yourself? Yes  Do your laundry/housekeeping?: Yes  Manage your money, pay your bills and track your expenses?: Yes  Make your own meals?: Yes    Do your own shopping?: Yes    Durable Medical Equipment Suppliers  N/A    Previous Hospitalizations:   Any hospitalizations or ED visits within the last 12 months?: Yes    How many hospitalizations have you had in the last year?: 1-2    Hospitalization Comments: [facility] and emergency room issues started July 10th 2024 and ended September 3rd 2024    Advance Care Planning:   Living will: Yes    Durable POA for healthcare: Yes    Advanced directive: Yes      Preventive Screenings      Cardiovascular Screening:    General: Screening Not Indicated and History Lipid Disorder      Diabetes Screening:     General: Screening Not Indicated and History Diabetes      Colorectal Cancer Screening:     General: Screening Current       "Abdominal Aortic Aneurysm (AAA) Screening:    Risk factors include: age between 65-76 yo        Lung Cancer Screening:     General: Screening Not Indicated      Hepatitis C Screening:    General: Screening Current    Immunizations:  - Immunizations due: Zoster (Shingrix)    Screening, Brief Intervention, and Referral to Treatment (SBIRT)     Screening  Typical number of drinks in a day: 0  Typical number of drinks in a week: 0  Interpretation: Low risk drinking behavior.    AUDIT-C Screenin) How often did you have a drink containing alcohol in the past year? never  2) How many drinks did you have on a typical day when you were drinking in the past year? 0  3) How often did you have 6 or more drinks on one occasion in the past year? never    AUDIT-C Score: 0  Interpretation: Score 0-3 (male): Negative screen for alcohol misuse    Single Item Drug Screening:  How often have you used an illegal drug (including marijuana) or a prescription medication for non-medical reasons in the past year? never    Single Item Drug Screen Score: 0  Interpretation: Negative screen for possible drug use disorder    Social Drivers of Health     Food Insecurity: No Food Insecurity (2025)    Nursing - Inadequate Food Risk Classification    • Worried About Running Out of Food in the Last Year: Never true    • Ran Out of Food in the Last Year: Never true   Transportation Needs: No Transportation Needs (2025)    PRAPARE - Transportation    • Lack of Transportation (Medical): No    • Lack of Transportation (Non-Medical): No   Housing Stability: Low Risk  (2025)    Housing Stability Vital Sign    • Unable to Pay for Housing in the Last Year: No    • Number of Times Moved in the Last Year: 0    • Homeless in the Last Year: No   Utilities: Not At Risk (2025)    [facility] Utilities    • Threatened with loss of utilities: No     No results found.    Objective   /84   Pulse 62   Temp 98 °F (36.7 °C)   Ht 6' 3\" (1.905 m)  "  Wt (!) 139 kg (307 lb)   SpO2 98%   BMI 38.37 kg/m²     Physical Exam  Vitals reviewed.   Constitutional:       General: He is not in acute distress.     Appearance: Normal appearance. He is obese.   HENT:      Head: Normocephalic and atraumatic.      Right Ear: External ear normal.      Left Ear: External ear normal.      Nose: Nose normal.      Mouth/Throat:      Mouth: Mucous membranes are moist.     Eyes:      Extraocular Movements: Extraocular movements intact.      Conjunctiva/sclera: Conjunctivae normal.     Neck:      Comments: Forward head carriage  Cardiovascular:      Rate and Rhythm: Normal rate and regular rhythm.   Pulmonary:      Effort: Pulmonary effort is normal.      Breath sounds: Normal breath sounds. No wheezing, rhonchi or rales.   Abdominal:      General: Bowel sounds are normal. There is no distension.      Palpations: Abdomen is soft.      Tenderness: There is no abdominal tenderness.     Musculoskeletal:      Right lower leg: No edema.      Left lower leg: No edema.     Skin:     General: Skin is warm.      Capillary Refill: Capillary refill takes less than 2 seconds.      Findings: No rash.     Neurological:      Mental Status: He is alert. Mental status is at baseline.           DO Justin Garza [facility]  7/3/2025 3:30 PM

## 2025-08-02 DIAGNOSIS — I10 PRIMARY HYPERTENSION: ICD-10-CM

## 2025-08-02 DIAGNOSIS — N18.30 HYPERTENSIVE KIDNEY DISEASE WITH STAGE 3 CHRONIC KIDNEY DISEASE, UNSPECIFIED WHETHER STAGE 3A OR 3B CKD (HCC): ICD-10-CM

## 2025-08-02 DIAGNOSIS — I12.9 HYPERTENSIVE KIDNEY DISEASE WITH STAGE 3 CHRONIC KIDNEY DISEASE, UNSPECIFIED WHETHER STAGE 3A OR 3B CKD (HCC): ICD-10-CM

## 2025-08-04 DIAGNOSIS — I10 PRIMARY HYPERTENSION: ICD-10-CM

## 2025-08-04 RX ORDER — LOSARTAN POTASSIUM 100 MG/1
100 TABLET ORAL DAILY
Qty: 90 TABLET | Refills: 1 | Status: SHIPPED | OUTPATIENT
Start: 2025-08-04

## 2025-08-05 RX ORDER — LOSARTAN POTASSIUM 25 MG/1
25 TABLET ORAL DAILY
Qty: 90 TABLET | Refills: 1 | Status: SHIPPED | OUTPATIENT
Start: 2025-08-05

## 2025-08-19 ENCOUNTER — TELEPHONE (OUTPATIENT)
Age: 70
End: 2025-08-19

## 2025-08-21 DIAGNOSIS — R80.8 OTHER PROTEINURIA: ICD-10-CM

## (undated) DEVICE — MINOR PROCEDURE DRAPE: Brand: CONVERTORS

## (undated) DEVICE — CHLORAPREP HI-LITE 26ML ORANGE

## (undated) DEVICE — INTENDED FOR TISSUE SEPARATION, AND OTHER PROCEDURES THAT REQUIRE A SHARP SURGICAL BLADE TO PUNCTURE OR CUT.: Brand: BARD-PARKER SAFETY BLADES SIZE 15, STERILE

## (undated) DEVICE — STOCKINETTE REGULAR

## (undated) DEVICE — DRAPE TOWEL: Brand: CONVERTORS

## (undated) DEVICE — GLOVE SRG BIOGEL 7.5

## (undated) DEVICE — BANDAGE, ESMARK LF STR 4"X9'(20/CS): Brand: CYPRESS

## (undated) DEVICE — SUT ETHILON 4-0 PS-2 18 IN 1667G

## (undated) DEVICE — SUT VICRYL 4-0 PS-2 18 IN J496G

## (undated) DEVICE — PAD GROUNDING ADULT

## (undated) DEVICE — STRETCH BANDAGE: Brand: CURITY

## (undated) DEVICE — ACE WRAP 3 IN UNSTERILE

## (undated) DEVICE — CURITY NON-ADHERENT STRIPS: Brand: CURITY

## (undated) DEVICE — NEEDLE 25G X 1 1/2

## (undated) DEVICE — DECANTER: Brand: UNBRANDED

## (undated) DEVICE — SPONGE PVP SCRUB WING STERILE

## (undated) DEVICE — GLOVE INDICATOR PI UNDERGLOVE SZ 7 BLUE

## (undated) DEVICE — NON-STERILE REUSABLE TOURNIQUET CUFF SINGLE BLADDER, DUAL PORT AND QUICK CONNECT CONNECTOR: Brand: COLOR CUFF

## (undated) DEVICE — COBAN 4 IN STERILE

## (undated) DEVICE — GAUZE SPONGES,16 PLY: Brand: CURITY

## (undated) DEVICE — ADHESIVE SKIN HIGH VISCOSITY EXOFIN 1ML

## (undated) DEVICE — KERLIX BANDAGE ROLL: Brand: KERLIX

## (undated) DEVICE — SUT VICRYL 3-0 SH 27 IN J416H

## (undated) DEVICE — SPONGE 4 X 4 XRAY 16 PLY STRL LF RFD

## (undated) DEVICE — VIOLET BRAIDED (POLYGLACTIN 910), SYNTHETIC ABSORBABLE SUTURE: Brand: COATED VICRYL

## (undated) DEVICE — GLOVE SRG BIOGEL 6.5

## (undated) DEVICE — UNIVERSAL MAJOR EXTREMITY,KIT: Brand: CARDINAL HEALTH

## (undated) DEVICE — MEDI-VAC YANKAUER SUCTION HANDLE W/STRAIGHT TIP & CONTROL VENT: Brand: CARDINAL HEALTH

## (undated) DEVICE — GAUZE SPONGES,USP TYPE VII GAUZE, 12 PLY: Brand: CURITY

## (undated) DEVICE — SYRINGE BULB 2 OZ

## (undated) DEVICE — C-WIRE PAK DOUBLE ENDED ORTHOPAEDIC WIRE, SPADE, .045" (1.14 MM)
Type: IMPLANTABLE DEVICE | Site: FOOT | Status: NON-FUNCTIONAL
Brand: C-WIRE
Removed: 2020-11-24

## (undated) DEVICE — BLADE SAGITTAL 25.6 X 9.5MM

## (undated) DEVICE — STERILE ICS MINOR PACK: Brand: CARDINAL HEALTH

## (undated) DEVICE — ALL PURPOSE SPONGES,NON-WOVEN, 3 PLY: Brand: CURITY

## (undated) DEVICE — ACE WRAP 3 IN STERILE

## (undated) DEVICE — MEDI-VAC YANK SUCT HNDL W/TPRD BULBOUS TIP: Brand: CARDINAL HEALTH